# Patient Record
Sex: MALE | Race: WHITE | NOT HISPANIC OR LATINO | Employment: OTHER | URBAN - NONMETROPOLITAN AREA
[De-identification: names, ages, dates, MRNs, and addresses within clinical notes are randomized per-mention and may not be internally consistent; named-entity substitution may affect disease eponyms.]

---

## 2017-02-01 ENCOUNTER — HOSPITAL ENCOUNTER (EMERGENCY)
Facility: HOSPITAL | Age: 37
Discharge: HOME | End: 2017-02-01
Attending: EMERGENCY MEDICINE | Admitting: EMERGENCY MEDICINE

## 2017-02-01 ENCOUNTER — APPOINTMENT (OUTPATIENT)
Dept: GENERAL RADIOLOGY | Facility: HOSPITAL | Age: 37
End: 2017-02-01

## 2017-02-01 VITALS
WEIGHT: 315 LBS | BODY MASS INDEX: 44.1 KG/M2 | RESPIRATION RATE: 20 BRPM | OXYGEN SATURATION: 94 % | TEMPERATURE: 98.1 F | SYSTOLIC BLOOD PRESSURE: 150 MMHG | DIASTOLIC BLOOD PRESSURE: 76 MMHG | HEART RATE: 81 BPM | HEIGHT: 71 IN

## 2017-02-01 DIAGNOSIS — R07.89 CHEST PAIN, ATYPICAL: Primary | ICD-10-CM

## 2017-02-01 LAB
ALBUMIN SERPL-MCNC: 3.8 G/DL (ref 3.4–4.8)
ALBUMIN/GLOB SERPL: 1.2 G/DL (ref 1.1–1.8)
ALP SERPL-CCNC: 74 U/L (ref 38–126)
ALT SERPL W P-5'-P-CCNC: 33 U/L (ref 21–72)
ANION GAP SERPL CALCULATED.3IONS-SCNC: 10 MMOL/L (ref 5–15)
AST SERPL-CCNC: 33 U/L (ref 17–59)
BASOPHILS # BLD AUTO: 0.02 10*3/MM3 (ref 0–0.2)
BASOPHILS NFR BLD AUTO: 0.2 % (ref 0–2)
BILIRUB SERPL-MCNC: 0.5 MG/DL (ref 0.2–1.3)
BUN BLD-MCNC: 12 MG/DL (ref 7–21)
BUN/CREAT SERPL: 17.1 (ref 7–25)
CALCIUM SPEC-SCNC: 9.1 MG/DL (ref 8.4–10.2)
CHLORIDE SERPL-SCNC: 95 MMOL/L (ref 95–110)
CO2 SERPL-SCNC: 28 MMOL/L (ref 22–31)
CREAT BLD-MCNC: 0.7 MG/DL (ref 0.7–1.3)
DEPRECATED RDW RBC AUTO: 44.7 FL (ref 35.1–43.9)
EOSINOPHIL # BLD AUTO: 0.18 10*3/MM3 (ref 0–0.7)
EOSINOPHIL NFR BLD AUTO: 1.7 % (ref 0–7)
ERYTHROCYTE [DISTWIDTH] IN BLOOD BY AUTOMATED COUNT: 15.4 % (ref 11.5–14.5)
GFR SERPL CREATININE-BSD FRML MDRD: 126 ML/MIN/1.73 (ref 70–162)
GLOBULIN UR ELPH-MCNC: 3.3 GM/DL (ref 2.3–3.5)
GLUCOSE BLD-MCNC: 156 MG/DL (ref 60–100)
HCT VFR BLD AUTO: 37.3 % (ref 39–49)
HGB BLD-MCNC: 12.8 G/DL (ref 13.7–17.3)
HOLD SPECIMEN: NORMAL
HOLD SPECIMEN: NORMAL
IMM GRANULOCYTES # BLD: 0.1 10*3/MM3 (ref 0–0.02)
IMM GRANULOCYTES NFR BLD: 0.9 % (ref 0–0.5)
INR PPP: 1.07 (ref 0.8–1.2)
LYMPHOCYTES # BLD AUTO: 1.59 10*3/MM3 (ref 0.6–4.2)
LYMPHOCYTES NFR BLD AUTO: 15 % (ref 10–50)
MCH RBC QN AUTO: 27.1 PG (ref 26.5–34)
MCHC RBC AUTO-ENTMCNC: 34.3 G/DL (ref 31.5–36.3)
MCV RBC AUTO: 78.9 FL (ref 80–98)
MONOCYTES # BLD AUTO: 0.76 10*3/MM3 (ref 0–0.9)
MONOCYTES NFR BLD AUTO: 7.2 % (ref 0–12)
NEUTROPHILS # BLD AUTO: 7.96 10*3/MM3 (ref 2–8.6)
NEUTROPHILS NFR BLD AUTO: 75 % (ref 37–80)
NRBC BLD MANUAL-RTO: 0 /100 WBC (ref 0–0)
NT-PROBNP SERPL-MCNC: 37.7 PG/ML (ref 0–450)
PLATELET # BLD AUTO: 204 10*3/MM3 (ref 150–450)
PMV BLD AUTO: 9.1 FL (ref 8–12)
POTASSIUM BLD-SCNC: 3.9 MMOL/L (ref 3.5–5.1)
PROT SERPL-MCNC: 7.1 G/DL (ref 6.3–8.6)
PROTHROMBIN TIME: 13.9 SECONDS (ref 11.1–15.3)
RBC # BLD AUTO: 4.73 10*6/MM3 (ref 4.37–5.74)
SODIUM BLD-SCNC: 133 MMOL/L (ref 137–145)
TROPONIN I SERPL-MCNC: <0.012 NG/ML
TROPONIN I SERPL-MCNC: <0.012 NG/ML
WBC NRBC COR # BLD: 10.61 10*3/MM3 (ref 3.2–9.8)
WHOLE BLOOD HOLD SPECIMEN: NORMAL
WHOLE BLOOD HOLD SPECIMEN: NORMAL

## 2017-02-01 PROCEDURE — 93010 ELECTROCARDIOGRAM REPORT: CPT | Performed by: INTERNAL MEDICINE

## 2017-02-01 PROCEDURE — 83880 ASSAY OF NATRIURETIC PEPTIDE: CPT | Performed by: EMERGENCY MEDICINE

## 2017-02-01 PROCEDURE — 71010 HC CHEST PA OR AP: CPT

## 2017-02-01 PROCEDURE — 85610 PROTHROMBIN TIME: CPT | Performed by: EMERGENCY MEDICINE

## 2017-02-01 PROCEDURE — 84484 ASSAY OF TROPONIN QUANT: CPT | Performed by: EMERGENCY MEDICINE

## 2017-02-01 PROCEDURE — 80053 COMPREHEN METABOLIC PANEL: CPT | Performed by: EMERGENCY MEDICINE

## 2017-02-01 PROCEDURE — 93005 ELECTROCARDIOGRAM TRACING: CPT | Performed by: EMERGENCY MEDICINE

## 2017-02-01 PROCEDURE — 93005 ELECTROCARDIOGRAM TRACING: CPT

## 2017-02-01 PROCEDURE — 36415 COLL VENOUS BLD VENIPUNCTURE: CPT

## 2017-02-01 PROCEDURE — 99284 EMERGENCY DEPT VISIT MOD MDM: CPT

## 2017-02-01 PROCEDURE — 85025 COMPLETE CBC W/AUTO DIFF WBC: CPT | Performed by: EMERGENCY MEDICINE

## 2017-02-01 RX ORDER — ASPIRIN 325 MG
325 TABLET ORAL ONCE
Status: COMPLETED | OUTPATIENT
Start: 2017-02-01 | End: 2017-02-01

## 2017-02-01 RX ORDER — SODIUM CHLORIDE 0.9 % (FLUSH) 0.9 %
10 SYRINGE (ML) INJECTION AS NEEDED
Status: DISCONTINUED | OUTPATIENT
Start: 2017-02-01 | End: 2017-02-01 | Stop reason: HOSPADM

## 2017-02-01 RX ADMIN — Medication 10 ML: at 16:45

## 2017-02-01 RX ADMIN — NITROGLYCERIN 1 INCH: 20 OINTMENT TOPICAL at 15:24

## 2017-02-01 RX ADMIN — ASPIRIN 325 MG: 325 TABLET, COATED ORAL at 15:24

## 2017-02-01 NOTE — ED NOTES
Call placed to lab to have blood drawn due to patient very difficult stick.       Ritika Osorio RN  02/01/17 1343

## 2017-02-01 NOTE — ED PROVIDER NOTES
Subjective   HPI Comments: Mr. Hansen presents today with recurrent CP across the substernal area with some mild dizziness, just a touch per his account.  No recent illnesses.  Patient has had prior stenting and has been having chest pain episodes about once a month since.  This has been associated with small vessel disease.      Patient is a 36 y.o. male presenting with chest pain.   History provided by:  Patient   used: No    Chest Pain   Pain location:  Substernal area  Pain quality: aching, pressure and tightness    Pain radiates to:  Does not radiate  Pain severity:  No pain  Onset quality:  Sudden  Duration:  2 hours  Timing:  Unable to specify  Progression:  Improving  Chronicity:  New  Context: not breathing and not trauma    Relieved by:  Nothing  Worsened by:  Nothing  Ineffective treatments:  None tried  Associated symptoms: dizziness (mild) and fatigue    Associated symptoms: no abdominal pain, no AICD problem, no altered mental status, no anorexia, no anxiety, no back pain, no cough, no diaphoresis, no fever, no headache, no nausea, no near-syncope, no numbness, no orthopnea, no palpitations, no shortness of breath, no syncope, no vomiting and no weakness    Risk factors: coronary artery disease and obesity        Review of Systems   Constitutional: Positive for fatigue. Negative for appetite change, chills, diaphoresis and fever.   HENT: Negative.  Negative for congestion.    Eyes: Negative.  Negative for photophobia and visual disturbance.   Respiratory: Positive for chest tightness. Negative for cough and shortness of breath.    Cardiovascular: Positive for chest pain. Negative for palpitations, orthopnea, syncope and near-syncope.   Gastrointestinal: Negative.  Negative for abdominal pain, anorexia, constipation, diarrhea, nausea and vomiting.   Endocrine: Negative.    Genitourinary: Negative.  Negative for decreased urine volume, dysuria, flank pain and hematuria.    Musculoskeletal: Negative.  Negative for arthralgias, back pain, myalgias, neck pain and neck stiffness.   Skin: Negative.  Negative for pallor.   Neurological: Positive for dizziness (mild). Negative for syncope, weakness, light-headedness, numbness and headaches.   Psychiatric/Behavioral: Negative.  Negative for confusion and suicidal ideas. The patient is not nervous/anxious.    All other systems reviewed and are negative.      Past Medical History   Diagnosis Date   • Abdominal pain    • Acute right otitis media    • Allergic rhinitis    • Asthma    • Backache    • Bronchitis    • Cellulitis of lower leg      Right   • Chest pain    • Chronic back pain    • Chronic pain    • Cough    • Diarrhea    • Dyspnea    • GERD (gastroesophageal reflux disease)    • Headache    • Hip pain    • Hypertension    • Insomnia    • Low back pain    • Lumbosacral strain    • Morbid obesity    • RLS (restless legs syndrome)    • Seizures        Allergies   Allergen Reactions   • Glipizide    • Phenergan [Promethazine Hcl]    • Risperdal [Risperidone]    • Tramadol        Past Surgical History   Procedure Laterality Date   • Transesophageal echocardiogram ([facility])       With color flow       Family History   Problem Relation Age of Onset   • Cancer Other    • Coronary artery disease Other    • Diabetes Other    • Heart disease Other    • Hypertension Other        Social History     Social History   • Marital status:      Spouse name: N/A   • Number of children: N/A   • Years of education: N/A     Social History Main Topics   • Smoking status: Former Smoker   • Smokeless tobacco: None   • Alcohol use No   • Drug use: No   • Sexual activity: Not Asked     Other Topics Concern   • None     Social History Narrative           Objective   Physical Exam   Constitutional: He is oriented to person, place, and time. He appears well-developed and well-nourished. No distress.   HENT:   Head: Normocephalic and atraumatic.   Eyes:  Conjunctivae and EOM are normal.   Neck: Normal range of motion. Neck supple. No JVD present. No tracheal deviation present.   Cardiovascular: Normal rate, regular rhythm, normal heart sounds and intact distal pulses.  Exam reveals no gallop and no friction rub.    No murmur heard.  Pulmonary/Chest: Effort normal and breath sounds normal. No respiratory distress. He has no wheezes. He has no rales. He exhibits no tenderness.   Abdominal: Soft. Bowel sounds are normal. He exhibits no distension and no mass. There is no tenderness. There is no rebound and no guarding.   Musculoskeletal: Normal range of motion. He exhibits edema.   Neurological: He is alert and oriented to person, place, and time.   Skin: Skin is warm and dry.   Psychiatric: He has a normal mood and affect. His behavior is normal. Judgment and thought content normal.   Nursing note and vitals reviewed.      ECG 12 Lead    Date/Time: 2/1/2017 3:05 PM  Performed by: RICARDA SHEETS  Authorized by: RICARDA SHEETS   Interpreted by physician  Comparison: compared with previous ECG   Comparison to previous ECG: No change from 15jan2017  Conduction: conduction normal  Other: no other findings  Comments: Nonspecific ST T wave changes                 ED Course  ED Course      Labs Reviewed   COMPREHENSIVE METABOLIC PANEL - Abnormal; Notable for the following:        Result Value    Glucose 156 (*)     Sodium 133 (*)     All other components within normal limits   CBC WITH AUTO DIFFERENTIAL - Abnormal; Notable for the following:     WBC 10.61 (*)     Hemoglobin 12.8 (*)     Hematocrit 37.3 (*)     MCV 78.9 (*)     RDW 15.4 (*)     RDW-SD 44.7 (*)     Immature Grans % 0.9 (*)     Immature Grans, Absolute 0.10 (*)     All other components within normal limits   TROPONIN (IN-HOUSE) - Normal   PROTIME-INR - Normal    Narrative:     Therapeutic range for most indications is 2.0-3.0 INR,  or 2.5-3.5 for mechanical heart valves.   BNP (IN-HOUSE) - Normal   TROPONIN  (IN-HOUSE) - Normal   RAINBOW DRAW    Narrative:     The following orders were created for panel order [place] Draw.  Procedure                               Abnormality         Status                     ---------                               -----------         ------                     Light Blue Top[[id number]]                                    In process                 Green Top (Gel)[[id number]]                                   In process                 Lavender Top[[id number]]                                      In process                 Gold Top - SST[[id number]]                                    In process                   Please view results for these tests on the individual orders.   CBC AND DIFFERENTIAL    Narrative:     The following orders were created for panel order CBC & Differential.  Procedure                               Abnormality         Status                     ---------                               -----------         ------                     CBC Auto Differential[[id number]]         Abnormal            Final result                 Please view results for these tests on the individual orders.   LIGHT BLUE TOP   GREEN TOP   LAVENDER TOP   GOLD TOP - SST       XR Chest 1 View   Final Result   CONCLUSION:    Borderline cardiac size. No acute cardiopulmonary process identified.      Electronically signed by:  Gela Shaffer MD  2/1/2017 3:27 PM CST           Negative markers x 2.  Patient with recent CT of the coronaries negative.  Case discussed with patients cardiologist, Dr. Aldnaa.  Will follow patient outpatient for further management.  Does not appear to be ACS.              [facility]    Final diagnoses:   Chest pain, atypical            Asim Kumar MD  02/01/17 9970

## 2017-02-01 NOTE — ED NOTES
Assisted MD with visual exam of perineum, some whitish discharge noted to perineum.  Swab obtained.     Ritika Osorio RN  02/01/17 9525

## 2017-02-02 NOTE — DISCHARGE INSTRUCTIONS
Continue current medications.  Followup with Dr. Aldana tomorrow for further management.  Your case was discussed with him over the phone.  Return with any new or worsening symptoms, or any concerns.

## 2017-03-08 ENCOUNTER — APPOINTMENT (OUTPATIENT)
Dept: GENERAL RADIOLOGY | Facility: HOSPITAL | Age: 37
End: 2017-03-08

## 2017-03-08 ENCOUNTER — HOSPITAL ENCOUNTER (OUTPATIENT)
Facility: HOSPITAL | Age: 37
Setting detail: OBSERVATION
Discharge: HOME | End: 2017-03-10
Attending: EMERGENCY MEDICINE | Admitting: INTERNAL MEDICINE

## 2017-03-08 DIAGNOSIS — R07.2 PRECORDIAL PAIN: Primary | ICD-10-CM

## 2017-03-08 LAB
ALBUMIN SERPL-MCNC: 3.9 G/DL (ref 3.4–4.8)
ALBUMIN/GLOB SERPL: 1.1 G/DL (ref 1.1–1.8)
ALP SERPL-CCNC: 77 U/L (ref 38–126)
ALT SERPL W P-5'-P-CCNC: 35 U/L (ref 21–72)
ANION GAP SERPL CALCULATED.3IONS-SCNC: 11 MMOL/L (ref 5–15)
ANION GAP SERPL CALCULATED.3IONS-SCNC: 11 MMOL/L (ref 5–15)
AST SERPL-CCNC: 28 U/L (ref 17–59)
BASOPHILS # BLD AUTO: 0.02 10*3/MM3 (ref 0–0.2)
BASOPHILS # BLD AUTO: 0.02 10*3/MM3 (ref 0–0.2)
BASOPHILS NFR BLD AUTO: 0.2 % (ref 0–2)
BASOPHILS NFR BLD AUTO: 0.3 % (ref 0–2)
BILIRUB SERPL-MCNC: 0.4 MG/DL (ref 0.2–1.3)
BUN BLD-MCNC: 14 MG/DL (ref 7–21)
BUN BLD-MCNC: 15 MG/DL (ref 7–21)
BUN/CREAT SERPL: 23.1 (ref 7–25)
BUN/CREAT SERPL: 23.3 (ref 7–25)
CALCIUM SPEC-SCNC: 8.8 MG/DL (ref 8.4–10.2)
CALCIUM SPEC-SCNC: 8.9 MG/DL (ref 8.4–10.2)
CHLORIDE SERPL-SCNC: 97 MMOL/L (ref 95–110)
CHLORIDE SERPL-SCNC: 98 MMOL/L (ref 95–110)
CHOLEST SERPL-MCNC: 176 MG/DL (ref 0–199)
CO2 SERPL-SCNC: 26 MMOL/L (ref 22–31)
CO2 SERPL-SCNC: 26 MMOL/L (ref 22–31)
CREAT BLD-MCNC: 0.6 MG/DL (ref 0.7–1.3)
CREAT BLD-MCNC: 0.65 MG/DL (ref 0.7–1.3)
D-DIMER, QUANTITATIVE (MAD,POW, STR): 355 NG/ML (FEU) (ref 0–470)
DEPRECATED RDW RBC AUTO: 43 FL (ref 35.1–43.9)
DEPRECATED RDW RBC AUTO: 44.3 FL (ref 35.1–43.9)
EOSINOPHIL # BLD AUTO: 0.2 10*3/MM3 (ref 0–0.7)
EOSINOPHIL # BLD AUTO: 0.22 10*3/MM3 (ref 0–0.7)
EOSINOPHIL NFR BLD AUTO: 2.1 % (ref 0–7)
EOSINOPHIL NFR BLD AUTO: 3.2 % (ref 0–7)
ERYTHROCYTE [DISTWIDTH] IN BLOOD BY AUTOMATED COUNT: 14.6 % (ref 11.5–14.5)
ERYTHROCYTE [DISTWIDTH] IN BLOOD BY AUTOMATED COUNT: 15.1 % (ref 11.5–14.5)
GFR SERPL CREATININE-BSD FRML MDRD: 137 ML/MIN/1.73 (ref 70–162)
GFR SERPL CREATININE-BSD FRML MDRD: 151 ML/MIN/1.73 (ref 70–162)
GLOBULIN UR ELPH-MCNC: 3.7 GM/DL (ref 2.3–3.5)
GLUCOSE BLD-MCNC: 143 MG/DL (ref 60–100)
GLUCOSE BLD-MCNC: 209 MG/DL (ref 60–100)
GLUCOSE BLDC GLUCOMTR-MCNC: 292 MG/DL (ref 70–130)
HCT VFR BLD AUTO: 37.7 % (ref 39–49)
HCT VFR BLD AUTO: 39.1 % (ref 39–49)
HGB BLD-MCNC: 12.6 G/DL (ref 13.7–17.3)
HGB BLD-MCNC: 13.4 G/DL (ref 13.7–17.3)
HOLD SPECIMEN: NORMAL
HOLD SPECIMEN: NORMAL
IMM GRANULOCYTES # BLD: 0.1 10*3/MM3 (ref 0–0.02)
IMM GRANULOCYTES # BLD: 0.1 10*3/MM3 (ref 0–0.02)
IMM GRANULOCYTES NFR BLD: 1.1 % (ref 0–0.5)
IMM GRANULOCYTES NFR BLD: 1.5 % (ref 0–0.5)
LYMPHOCYTES # BLD AUTO: 1.59 10*3/MM3 (ref 0.6–4.2)
LYMPHOCYTES # BLD AUTO: 1.63 10*3/MM3 (ref 0.6–4.2)
LYMPHOCYTES NFR BLD AUTO: 17.4 % (ref 10–50)
LYMPHOCYTES NFR BLD AUTO: 23.1 % (ref 10–50)
MCH RBC QN AUTO: 27 PG (ref 26.5–34)
MCH RBC QN AUTO: 27.7 PG (ref 26.5–34)
MCHC RBC AUTO-ENTMCNC: 33.4 G/DL (ref 31.5–36.3)
MCHC RBC AUTO-ENTMCNC: 34.3 G/DL (ref 31.5–36.3)
MCV RBC AUTO: 80.7 FL (ref 80–98)
MCV RBC AUTO: 81 FL (ref 80–98)
MONOCYTES # BLD AUTO: 0.46 10*3/MM3 (ref 0–0.9)
MONOCYTES # BLD AUTO: 0.91 10*3/MM3 (ref 0–0.9)
MONOCYTES NFR BLD AUTO: 6.7 % (ref 0–12)
MONOCYTES NFR BLD AUTO: 9.7 % (ref 0–12)
NEUTROPHILS # BLD AUTO: 4.5 10*3/MM3 (ref 2–8.6)
NEUTROPHILS # BLD AUTO: 6.51 10*3/MM3 (ref 2–8.6)
NEUTROPHILS NFR BLD AUTO: 65.2 % (ref 37–80)
NEUTROPHILS NFR BLD AUTO: 69.5 % (ref 37–80)
NT-PROBNP SERPL-MCNC: 47.9 PG/ML (ref 0–450)
PLAT MORPH BLD: NORMAL
PLATELET # BLD AUTO: 176 10*3/MM3 (ref 150–450)
PLATELET # BLD AUTO: 181 10*3/MM3 (ref 150–450)
PMV BLD AUTO: 8.7 FL (ref 8–12)
PMV BLD AUTO: 9.5 FL (ref 8–12)
POTASSIUM BLD-SCNC: 4 MMOL/L (ref 3.5–5.1)
POTASSIUM BLD-SCNC: 4.1 MMOL/L (ref 3.5–5.1)
PROT SERPL-MCNC: 7.6 G/DL (ref 6.3–8.6)
RBC # BLD AUTO: 4.67 10*6/MM3 (ref 4.37–5.74)
RBC # BLD AUTO: 4.83 10*6/MM3 (ref 4.37–5.74)
RBC MORPH BLD: NORMAL
SODIUM BLD-SCNC: 134 MMOL/L (ref 137–145)
SODIUM BLD-SCNC: 135 MMOL/L (ref 137–145)
TROPONIN I SERPL-MCNC: <0.012 NG/ML
TROPONIN I SERPL-MCNC: <0.012 NG/ML
WBC MORPH BLD: NORMAL
WBC NRBC COR # BLD: 6.89 10*3/MM3 (ref 3.2–9.8)
WBC NRBC COR # BLD: 9.37 10*3/MM3 (ref 3.2–9.8)
WHOLE BLOOD HOLD SPECIMEN: NORMAL
WHOLE BLOOD HOLD SPECIMEN: NORMAL

## 2017-03-08 PROCEDURE — 96376 TX/PRO/DX INJ SAME DRUG ADON: CPT

## 2017-03-08 PROCEDURE — 80053 COMPREHEN METABOLIC PANEL: CPT | Performed by: EMERGENCY MEDICINE

## 2017-03-08 PROCEDURE — 83880 ASSAY OF NATRIURETIC PEPTIDE: CPT | Performed by: EMERGENCY MEDICINE

## 2017-03-08 PROCEDURE — 96374 THER/PROPH/DIAG INJ IV PUSH: CPT

## 2017-03-08 PROCEDURE — 25010000002 MORPHINE PER 10 MG: Performed by: EMERGENCY MEDICINE

## 2017-03-08 PROCEDURE — 25010000002 ONDANSETRON PER 1 MG: Performed by: NURSE PRACTITIONER

## 2017-03-08 PROCEDURE — 93005 ELECTROCARDIOGRAM TRACING: CPT | Performed by: INTERNAL MEDICINE

## 2017-03-08 PROCEDURE — 96375 TX/PRO/DX INJ NEW DRUG ADDON: CPT

## 2017-03-08 PROCEDURE — 82962 GLUCOSE BLOOD TEST: CPT

## 2017-03-08 PROCEDURE — 85025 COMPLETE CBC W/AUTO DIFF WBC: CPT | Performed by: EMERGENCY MEDICINE

## 2017-03-08 PROCEDURE — 85379 FIBRIN DEGRADATION QUANT: CPT | Performed by: EMERGENCY MEDICINE

## 2017-03-08 PROCEDURE — G0378 HOSPITAL OBSERVATION PER HR: HCPCS

## 2017-03-08 PROCEDURE — 82465 ASSAY BLD/SERUM CHOLESTEROL: CPT | Performed by: NURSE PRACTITIONER

## 2017-03-08 PROCEDURE — 63710000001 INSULIN ASPART PER 5 UNITS: Performed by: NURSE PRACTITIONER

## 2017-03-08 PROCEDURE — 84484 ASSAY OF TROPONIN QUANT: CPT | Performed by: NURSE PRACTITIONER

## 2017-03-08 PROCEDURE — 84484 ASSAY OF TROPONIN QUANT: CPT | Performed by: EMERGENCY MEDICINE

## 2017-03-08 PROCEDURE — 99285 EMERGENCY DEPT VISIT HI MDM: CPT

## 2017-03-08 PROCEDURE — 71010 HC CHEST PA OR AP: CPT

## 2017-03-08 PROCEDURE — 25010000002 ONDANSETRON PER 1 MG: Performed by: EMERGENCY MEDICINE

## 2017-03-08 PROCEDURE — 93010 ELECTROCARDIOGRAM REPORT: CPT | Performed by: INTERNAL MEDICINE

## 2017-03-08 PROCEDURE — 85007 BL SMEAR W/DIFF WBC COUNT: CPT | Performed by: NURSE PRACTITIONER

## 2017-03-08 PROCEDURE — 93005 ELECTROCARDIOGRAM TRACING: CPT | Performed by: NURSE PRACTITIONER

## 2017-03-08 PROCEDURE — 93005 ELECTROCARDIOGRAM TRACING: CPT

## 2017-03-08 PROCEDURE — 85025 COMPLETE CBC W/AUTO DIFF WBC: CPT | Performed by: NURSE PRACTITIONER

## 2017-03-08 RX ORDER — SODIUM CHLORIDE 0.9 % (FLUSH) 0.9 %
1-10 SYRINGE (ML) INJECTION AS NEEDED
Status: DISCONTINUED | OUTPATIENT
Start: 2017-03-08 | End: 2017-03-10 | Stop reason: HOSPADM

## 2017-03-08 RX ORDER — NITROGLYCERIN 0.4 MG/1
0.4 TABLET SUBLINGUAL
Status: DISCONTINUED | OUTPATIENT
Start: 2017-03-08 | End: 2017-03-10 | Stop reason: HOSPADM

## 2017-03-08 RX ORDER — ONDANSETRON 2 MG/ML
4 INJECTION INTRAMUSCULAR; INTRAVENOUS ONCE
Status: COMPLETED | OUTPATIENT
Start: 2017-03-08 | End: 2017-03-08

## 2017-03-08 RX ORDER — PANTOPRAZOLE SODIUM 40 MG/1
40 TABLET, DELAYED RELEASE ORAL
Status: DISCONTINUED | OUTPATIENT
Start: 2017-03-09 | End: 2017-03-10 | Stop reason: HOSPADM

## 2017-03-08 RX ORDER — ISOSORBIDE MONONITRATE 60 MG/1
120 TABLET, EXTENDED RELEASE ORAL DAILY
Status: DISCONTINUED | OUTPATIENT
Start: 2017-03-09 | End: 2017-03-10 | Stop reason: HOSPADM

## 2017-03-08 RX ORDER — MORPHINE SULFATE 4 MG/ML
4 INJECTION, SOLUTION INTRAMUSCULAR; INTRAVENOUS ONCE
Status: COMPLETED | OUTPATIENT
Start: 2017-03-08 | End: 2017-03-08

## 2017-03-08 RX ORDER — DEXTROSE MONOHYDRATE 25 G/50ML
25 INJECTION, SOLUTION INTRAVENOUS
Status: DISCONTINUED | OUTPATIENT
Start: 2017-03-08 | End: 2017-03-10 | Stop reason: HOSPADM

## 2017-03-08 RX ORDER — PRAMIPEXOLE DIHYDROCHLORIDE 1 MG/1
2 TABLET ORAL NIGHTLY
Status: DISCONTINUED | OUTPATIENT
Start: 2017-03-08 | End: 2017-03-10 | Stop reason: HOSPADM

## 2017-03-08 RX ORDER — METOPROLOL SUCCINATE 100 MG/1
100 TABLET, EXTENDED RELEASE ORAL DAILY
Status: DISCONTINUED | OUTPATIENT
Start: 2017-03-08 | End: 2017-03-10 | Stop reason: HOSPADM

## 2017-03-08 RX ORDER — PANTOPRAZOLE SODIUM 40 MG/1
40 TABLET, DELAYED RELEASE ORAL
Status: DISCONTINUED | OUTPATIENT
Start: 2017-03-09 | End: 2017-03-08 | Stop reason: SDUPTHER

## 2017-03-08 RX ORDER — ONDANSETRON 4 MG/1
4 TABLET, FILM COATED ORAL EVERY 6 HOURS PRN
Status: DISCONTINUED | OUTPATIENT
Start: 2017-03-08 | End: 2017-03-10 | Stop reason: HOSPADM

## 2017-03-08 RX ORDER — ASPIRIN 81 MG/1
81 TABLET, CHEWABLE ORAL DAILY
Status: DISCONTINUED | OUTPATIENT
Start: 2017-03-09 | End: 2017-03-10 | Stop reason: HOSPADM

## 2017-03-08 RX ORDER — RANOLAZINE 500 MG/1
1000 TABLET, EXTENDED RELEASE ORAL EVERY 12 HOURS SCHEDULED
Status: DISCONTINUED | OUTPATIENT
Start: 2017-03-08 | End: 2017-03-10 | Stop reason: HOSPADM

## 2017-03-08 RX ORDER — NICOTINE POLACRILEX 4 MG
15 LOZENGE BUCCAL
Status: DISCONTINUED | OUTPATIENT
Start: 2017-03-08 | End: 2017-03-10 | Stop reason: HOSPADM

## 2017-03-08 RX ORDER — SODIUM CHLORIDE 0.9 % (FLUSH) 0.9 %
10 SYRINGE (ML) INJECTION AS NEEDED
Status: DISCONTINUED | OUTPATIENT
Start: 2017-03-08 | End: 2017-03-10 | Stop reason: HOSPADM

## 2017-03-08 RX ORDER — AMLODIPINE BESYLATE 10 MG/1
10 TABLET ORAL DAILY
Status: DISCONTINUED | OUTPATIENT
Start: 2017-03-08 | End: 2017-03-10 | Stop reason: HOSPADM

## 2017-03-08 RX ORDER — PRASUGREL 10 MG/1
10 TABLET, FILM COATED ORAL DAILY
Status: DISCONTINUED | OUTPATIENT
Start: 2017-03-09 | End: 2017-03-10 | Stop reason: HOSPADM

## 2017-03-08 RX ORDER — LISINOPRIL 20 MG/1
20 TABLET ORAL DAILY
Status: DISCONTINUED | OUTPATIENT
Start: 2017-03-08 | End: 2017-03-10 | Stop reason: HOSPADM

## 2017-03-08 RX ORDER — ONDANSETRON 4 MG/1
4 TABLET, ORALLY DISINTEGRATING ORAL EVERY 6 HOURS PRN
Status: DISCONTINUED | OUTPATIENT
Start: 2017-03-08 | End: 2017-03-10 | Stop reason: HOSPADM

## 2017-03-08 RX ORDER — PRAVASTATIN SODIUM 40 MG
80 TABLET ORAL NIGHTLY
Status: DISCONTINUED | OUTPATIENT
Start: 2017-03-08 | End: 2017-03-10 | Stop reason: HOSPADM

## 2017-03-08 RX ORDER — TRAZODONE HYDROCHLORIDE 150 MG/1
300 TABLET ORAL NIGHTLY
Status: DISCONTINUED | OUTPATIENT
Start: 2017-03-08 | End: 2017-03-10 | Stop reason: HOSPADM

## 2017-03-08 RX ORDER — ASPIRIN 325 MG
325 TABLET ORAL ONCE
Status: DISCONTINUED | OUTPATIENT
Start: 2017-03-08 | End: 2017-03-08

## 2017-03-08 RX ORDER — ACETAMINOPHEN 325 MG/1
650 TABLET ORAL EVERY 4 HOURS PRN
Status: DISCONTINUED | OUTPATIENT
Start: 2017-03-08 | End: 2017-03-10 | Stop reason: HOSPADM

## 2017-03-08 RX ORDER — FAMOTIDINE 20 MG/1
20 TABLET, FILM COATED ORAL 2 TIMES DAILY
Status: DISCONTINUED | OUTPATIENT
Start: 2017-03-08 | End: 2017-03-10 | Stop reason: HOSPADM

## 2017-03-08 RX ORDER — HYDROCODONE BITARTRATE AND ACETAMINOPHEN 10; 325 MG/1; MG/1
1 TABLET ORAL EVERY 6 HOURS PRN
Status: DISCONTINUED | OUTPATIENT
Start: 2017-03-08 | End: 2017-03-10 | Stop reason: HOSPADM

## 2017-03-08 RX ORDER — ONDANSETRON 2 MG/ML
4 INJECTION INTRAMUSCULAR; INTRAVENOUS EVERY 6 HOURS PRN
Status: DISCONTINUED | OUTPATIENT
Start: 2017-03-08 | End: 2017-03-10 | Stop reason: HOSPADM

## 2017-03-08 RX ADMIN — HYDROCODONE BITARTRATE AND ACETAMINOPHEN 1 TABLET: 10; 325 TABLET ORAL at 22:52

## 2017-03-08 RX ADMIN — MORPHINE SULFATE 4 MG: 4 INJECTION, SOLUTION INTRAMUSCULAR; INTRAVENOUS at 17:54

## 2017-03-08 RX ADMIN — INSULIN ASPART 4 UNITS: 100 INJECTION, SOLUTION INTRAVENOUS; SUBCUTANEOUS at 22:51

## 2017-03-08 RX ADMIN — NITROGLYCERIN 1 INCH: 20 OINTMENT TOPICAL at 17:54

## 2017-03-08 RX ADMIN — FAMOTIDINE 20 MG: 20 TABLET ORAL at 20:12

## 2017-03-08 RX ADMIN — RANOLAZINE 1000 MG: 500 TABLET, FILM COATED, EXTENDED RELEASE ORAL at 20:12

## 2017-03-08 RX ADMIN — ONDANSETRON 4 MG: 2 INJECTION, SOLUTION INTRAMUSCULAR; INTRAVENOUS at 22:52

## 2017-03-08 RX ADMIN — PRAMIPEXOLE DIHYDROCHLORIDE 2 MG: 1 TABLET ORAL at 20:12

## 2017-03-08 RX ADMIN — ONDANSETRON 4 MG: 2 INJECTION INTRAMUSCULAR; INTRAVENOUS at 17:54

## 2017-03-08 RX ADMIN — PRAVASTATIN SODIUM 80 MG: 40 TABLET ORAL at 20:15

## 2017-03-08 RX ADMIN — AMLODIPINE BESYLATE 10 MG: 10 TABLET ORAL at 20:14

## 2017-03-08 RX ADMIN — TRAZODONE HYDROCHLORIDE 300 MG: 150 TABLET ORAL at 22:57

## 2017-03-08 RX ADMIN — METOPROLOL SUCCINATE 100 MG: 100 TABLET, EXTENDED RELEASE ORAL at 20:14

## 2017-03-08 NOTE — ED PROVIDER NOTES
Subjective   Patient is a 36 y.o. male presenting with chest pain.   History provided by:  Patient  Chest Pain   Pain location:  Substernal area  Pain quality: aching    Pain radiates to:  R arm  Pain severity:  Moderate  Onset quality:  Gradual  Duration:  3 hours  Timing:  Intermittent  Progression:  Worsening  Chronicity:  Recurrent  Relieved by:  Nothing  Worsened by:  Nothing  Ineffective treatments:  Nitroglycerin and aspirin  Associated symptoms: shortness of breath    Associated symptoms: no abdominal pain, no anxiety, no back pain, no cough, no dizziness, no fever, no headache, no nausea, no numbness, no palpitations and no vomiting    Shortness of breath:     Severity:  Moderate    Onset quality:  Gradual    Timing:  Intermittent    Progression:  Waxing and waning  Risk factors: coronary artery disease, diabetes mellitus, high cholesterol, hypertension, male sex and obesity        Review of Systems   Constitutional: Negative for activity change, chills and fever.   HENT: Negative for congestion, facial swelling, rhinorrhea and sinus pressure.    Eyes: Negative for photophobia and visual disturbance.   Respiratory: Positive for shortness of breath. Negative for cough, chest tightness and wheezing.    Cardiovascular: Positive for chest pain. Negative for palpitations.   Gastrointestinal: Negative for abdominal distention, abdominal pain, diarrhea, nausea and vomiting.   Endocrine: Negative for polyuria.   Genitourinary: Negative for flank pain.   Musculoskeletal: Negative for back pain, joint swelling and neck pain.   Skin: Negative for rash.   Neurological: Negative for dizziness, seizures, numbness and headaches.   Hematological: Negative for adenopathy.   Psychiatric/Behavioral: Negative for agitation.       Past Medical History   Diagnosis Date   • Abdominal pain    • Acute right otitis media    • Allergic rhinitis    • Asthma    • Backache    • Bronchitis    • Cellulitis of lower leg      Right   •  Chest pain    • Chronic back pain    • Chronic pain    • Cough    • Diarrhea    • Dyspnea    • GERD (gastroesophageal reflux disease)    • Headache    • Hip pain    • Hypertension    • Insomnia    • Low back pain    • Lumbosacral strain    • Morbid obesity    • RLS (restless legs syndrome)    • Seizures        Allergies   Allergen Reactions   • Glipizide    • Phenergan [Promethazine Hcl]    • Risperdal [Risperidone]    • Tramadol        Past Surgical History   Procedure Laterality Date   • Transesophageal echocardiogram ([facility])       With color flow   • Cardiac catheterization       LAD stent       Family History   Problem Relation Age of Onset   • Cancer Other    • Coronary artery disease Other    • Diabetes Other    • Heart disease Other    • Hypertension Other        Social History     Social History   • Marital status:      Spouse name: N/A   • Number of children: N/A   • Years of education: N/A     Social History Main Topics   • Smoking status: Former Smoker   • Smokeless tobacco: None   • Alcohol use No   • Drug use: No   • Sexual activity: Not Asked     Other Topics Concern   • None     Social History Narrative   • None           Objective   Physical Exam   Constitutional: He is oriented to person, place, and time. He appears well-developed and well-nourished. No distress.   HENT:   Head: Normocephalic and atraumatic.   Eyes: Conjunctivae are normal.   Neck: Normal range of motion. Neck supple.   Cardiovascular: Normal rate, regular rhythm and normal heart sounds.    Pulmonary/Chest: Effort normal and breath sounds normal. No respiratory distress. He has no wheezes.   Abdominal: Soft. Bowel sounds are normal. He exhibits no distension. There is no tenderness. There is no rebound and no guarding.   Musculoskeletal: Normal range of motion. He exhibits no edema or deformity.   Neurological: He is alert and oriented to person, place, and time.   Skin: Skin is warm and dry. No rash noted.   Psychiatric: He  has a normal mood and affect.   Nursing note and vitals reviewed.      ECG 12 Lead    Date/Time: 3/8/2017 4:10 PM  Performed by: ERIC HARRY  Authorized by: ERIC HARRY   Interpreted by physician  Rhythm: sinus rhythm  Rate: normal  QRS axis: normal  Conduction: conduction normal  ST Segments: ST segments normal  T Waves: T waves normal  Clinical impression: non-specific ECG               ED Course  ED Course   Comment By Time   Patient is given morphine and Nitropaste.  His pain is mildly improved.  He has negative D-dimer test and troponins ×1.  White count is negative.  Chest x-ray did not show any acute finding.  With his multiple risk factor he would be made observation admission for chest pain rule out.  I have D/w  and is accepted. Eric Harry MD 03/08 8191                Labs Reviewed   COMPREHENSIVE METABOLIC PANEL - Abnormal; Notable for the following:        Result Value    Glucose 209 (*)     Creatinine 0.65 (*)     Sodium 134 (*)     Globulin 3.7 (*)     All other components within normal limits   CBC WITH AUTO DIFFERENTIAL - Abnormal; Notable for the following:     Hemoglobin 13.4 (*)     RDW 14.6 (*)     Immature Grans % 1.1 (*)     Monocytes, Absolute 0.91 (*)     Immature Grans, Absolute 0.10 (*)     All other components within normal limits   BASIC METABOLIC PANEL - Abnormal; Notable for the following:     Glucose 143 (*)     Creatinine 0.60 (*)     Sodium 135 (*)     All other components within normal limits   CBC WITH AUTO DIFFERENTIAL - Abnormal; Notable for the following:     Hemoglobin 12.6 (*)     Hematocrit 37.7 (*)     RDW 15.1 (*)     RDW-SD 44.3 (*)     Immature Grans % 1.5 (*)     Immature Grans, Absolute 0.10 (*)     All other components within normal limits   BASIC METABOLIC PANEL - Abnormal; Notable for the following:     Glucose 250 (*)     Creatinine 0.66 (*)     Sodium 134 (*)     Calcium 8.2 (*)     BUN/Creatinine Ratio 25.8 (*)     All other components within normal  limits   HEMOGLOBIN A1C - Abnormal; Notable for the following:     Hemoglobin A1C 9.73 (*)     All other components within normal limits   CBC WITH AUTO DIFFERENTIAL - Abnormal; Notable for the following:     Hemoglobin 12.5 (*)     Hematocrit 37.5 (*)     RDW 15.1 (*)     RDW-SD 44.6 (*)     Immature Grans % 1.2 (*)     Immature Grans, Absolute 0.09 (*)     All other components within normal limits   BASIC METABOLIC PANEL - Abnormal; Notable for the following:     Glucose 241 (*)     Creatinine 0.67 (*)     Sodium 134 (*)     All other components within normal limits   CBC WITH AUTO DIFFERENTIAL - Abnormal; Notable for the following:     Hemoglobin 12.9 (*)     Hematocrit 38.4 (*)     RDW 15.1 (*)     RDW-SD 44.8 (*)     Immature Grans % 1.1 (*)     Immature Grans, Absolute 0.09 (*)     All other components within normal limits   POCT GLUCOSE FINGERSTICK - Abnormal; Notable for the following:     Glucose 292 (*)     All other components within normal limits   POCT GLUCOSE FINGERSTICK - Abnormal; Notable for the following:     Glucose 212 (*)     All other components within normal limits   POCT GLUCOSE FINGERSTICK - Abnormal; Notable for the following:     Glucose 231 (*)     All other components within normal limits   POCT GLUCOSE FINGERSTICK - Abnormal; Notable for the following:     Glucose 170 (*)     All other components within normal limits   POCT GLUCOSE FINGERSTICK - Abnormal; Notable for the following:     Glucose 245 (*)     All other components within normal limits   POCT GLUCOSE FINGERSTICK - Abnormal; Notable for the following:     Glucose 254 (*)     All other components within normal limits   TROPONIN (IN-HOUSE) - Normal   BNP (IN-HOUSE) - Normal   D-DIMER, QUANTITATIVE - Normal    Narrative:     Dimer values <500 ng/ml FEU are FDA approved as aid in diagnosis of deep venous thrombosis and pulmonary embolism.  This test should not be used in an exclusion strategy with pretest probability alone.    A  recent guideline regarding diagnosis for pulmonary thomboembolism recommends an adjusted exclusion criterion of age x 10 ng/ml FEU for patients >50 years of age ([employer]ori Intern Med 2015; 163: 701-711).   TROPONIN (IN-HOUSE) - Normal   TROPONIN (IN-HOUSE) - Normal   CHOLESTEROL, TOTAL - Normal   SCAN SLIDE - Normal   TROPONIN (IN-HOUSE) - Normal   RAINBOW DRAW    Narrative:     The following orders were created for panel order [place] Draw.  Procedure                               Abnormality         Status                     ---------                               -----------         ------                     Light Blue Top[[id number]]                                    Final result               Green Top (Gel)[[id number]]                                   Final result               Lavender Top[[id number]]                                      Final result               Gold Top - SST[[id number]]                                    Final result                 Please view results for these tests on the individual orders.   CBC AND DIFFERENTIAL    Narrative:     The following orders were created for panel order CBC & Differential.  Procedure                               Abnormality         Status                     ---------                               -----------         ------                     CBC Auto Differential[[id number]]         Abnormal            Final result                 Please view results for these tests on the individual orders.   LIGHT BLUE TOP   GREEN TOP   LAVENDER TOP   GOLD TOP - SST   CBC AND DIFFERENTIAL    Narrative:     The following orders were created for panel order CBC & Differential.  Procedure                               Abnormality         Status                     ---------                               -----------         ------                     Scan Slide[[id number]]                    Normal              Final result               CBC Auto Differential[[id number]]         Abnormal             Final result                 Please view results for these tests on the individual orders.   CBC AND DIFFERENTIAL    Narrative:     The following orders were created for panel order CBC & Differential.  Procedure                               Abnormality         Status                     ---------                               -----------         ------                     CBC Auto Differential[[id number]]         Abnormal            Final result                 Please view results for these tests on the individual orders.   CBC AND DIFFERENTIAL    Narrative:     The following orders were created for panel order CBC & Differential.  Procedure                               Abnormality         Status                     ---------                               -----------         ------                     CBC Auto Differential[[id number]]         Abnormal            Final result                 Please view results for these tests on the individual orders.       Xr Chest 1 View    Result Date: 3/13/2017  Narrative: PROCEDURE: Chest, portable AP upright at 3:12 PM. INDICATION: Chest pain. COMPARISON: 3/8/2017 and earlier exams. Heart size normal with normal vascularity. Lungs clear. No pleural effusion. Bony structures unremarkable.     Impression: No acute disease. [id number] Electronically signed by:  Julito Carbone MD  3/13/2017 3:45 PM CDT Workstation: [id number]    Xr Chest 1 View    Result Date: 3/8/2017  Narrative: Patient Name:  KEARA ESCOBAR Patient ID:  [id number] Ordering:  ERIC HARRY Attending:  ERIC HARRY Referring:  ERIC HARRY ------------------------------------------------ PORTABLE CHEST HISTORY: Chest pain. Shortness of breath. Portable AP upright film of the chest was obtained at 5:00 PM. COMPARISON: February 1, 2017 EKG leads in place. The lungs are clear of an acute process. The heart is enlarged. The pulmonary vasculature is not increased. No pleural effusion. No pneumothorax. No acute  osseous abnormality.     Impression: CONCLUSION: No Acute Disease Cardiomegaly [id number] Electronically signed by:  Kulwant Currie MD  3/8/2017 5:38 PM CST Workstation: [id number]           MDM    Final diagnoses:   Precordial pain            Simon Roldan MD  03/14/17 1952

## 2017-03-09 LAB
ANION GAP SERPL CALCULATED.3IONS-SCNC: 9 MMOL/L (ref 5–15)
BASOPHILS # BLD AUTO: 0.02 10*3/MM3 (ref 0–0.2)
BASOPHILS NFR BLD AUTO: 0.3 % (ref 0–2)
BUN BLD-MCNC: 17 MG/DL (ref 7–21)
BUN/CREAT SERPL: 25.8 (ref 7–25)
CALCIUM SPEC-SCNC: 8.2 MG/DL (ref 8.4–10.2)
CHLORIDE SERPL-SCNC: 98 MMOL/L (ref 95–110)
CO2 SERPL-SCNC: 27 MMOL/L (ref 22–31)
CREAT BLD-MCNC: 0.66 MG/DL (ref 0.7–1.3)
DEPRECATED RDW RBC AUTO: 44.6 FL (ref 35.1–43.9)
EOSINOPHIL # BLD AUTO: 0.23 10*3/MM3 (ref 0–0.7)
EOSINOPHIL NFR BLD AUTO: 3 % (ref 0–7)
ERYTHROCYTE [DISTWIDTH] IN BLOOD BY AUTOMATED COUNT: 15.1 % (ref 11.5–14.5)
GFR SERPL CREATININE-BSD FRML MDRD: 135 ML/MIN/1.73 (ref 70–162)
GLUCOSE BLD-MCNC: 250 MG/DL (ref 60–100)
GLUCOSE BLDC GLUCOMTR-MCNC: 170 MG/DL (ref 70–130)
GLUCOSE BLDC GLUCOMTR-MCNC: 212 MG/DL (ref 70–130)
GLUCOSE BLDC GLUCOMTR-MCNC: 231 MG/DL (ref 70–130)
HBA1C MFR BLD: 9.73 % (ref 4–5.6)
HCT VFR BLD AUTO: 37.5 % (ref 39–49)
HGB BLD-MCNC: 12.5 G/DL (ref 13.7–17.3)
IMM GRANULOCYTES # BLD: 0.09 10*3/MM3 (ref 0–0.02)
IMM GRANULOCYTES NFR BLD: 1.2 % (ref 0–0.5)
LYMPHOCYTES # BLD AUTO: 1.89 10*3/MM3 (ref 0.6–4.2)
LYMPHOCYTES NFR BLD AUTO: 24.5 % (ref 10–50)
MCH RBC QN AUTO: 27.4 PG (ref 26.5–34)
MCHC RBC AUTO-ENTMCNC: 33.3 G/DL (ref 31.5–36.3)
MCV RBC AUTO: 82.2 FL (ref 80–98)
MONOCYTES # BLD AUTO: 0.75 10*3/MM3 (ref 0–0.9)
MONOCYTES NFR BLD AUTO: 9.7 % (ref 0–12)
NEUTROPHILS # BLD AUTO: 4.72 10*3/MM3 (ref 2–8.6)
NEUTROPHILS NFR BLD AUTO: 61.3 % (ref 37–80)
PLATELET # BLD AUTO: 167 10*3/MM3 (ref 150–450)
PMV BLD AUTO: 9.1 FL (ref 8–12)
POTASSIUM BLD-SCNC: 4 MMOL/L (ref 3.5–5.1)
RBC # BLD AUTO: 4.56 10*6/MM3 (ref 4.37–5.74)
SODIUM BLD-SCNC: 134 MMOL/L (ref 137–145)
TROPONIN I SERPL-MCNC: <0.012 NG/ML
TROPONIN I SERPL-MCNC: <0.012 NG/ML
WBC NRBC COR # BLD: 7.7 10*3/MM3 (ref 3.2–9.8)

## 2017-03-09 PROCEDURE — 99219 PR INITIAL OBSERVATION CARE/DAY 50 MINUTES: CPT | Performed by: INTERNAL MEDICINE

## 2017-03-09 PROCEDURE — 84484 ASSAY OF TROPONIN QUANT: CPT | Performed by: NURSE PRACTITIONER

## 2017-03-09 PROCEDURE — 93010 ELECTROCARDIOGRAM REPORT: CPT | Performed by: INTERNAL MEDICINE

## 2017-03-09 PROCEDURE — 85025 COMPLETE CBC W/AUTO DIFF WBC: CPT | Performed by: NURSE PRACTITIONER

## 2017-03-09 PROCEDURE — 82962 GLUCOSE BLOOD TEST: CPT

## 2017-03-09 PROCEDURE — 63710000001 INSULIN ASPART PER 5 UNITS: Performed by: NURSE PRACTITIONER

## 2017-03-09 PROCEDURE — 93005 ELECTROCARDIOGRAM TRACING: CPT | Performed by: NURSE PRACTITIONER

## 2017-03-09 PROCEDURE — 25010000002 ONDANSETRON PER 1 MG: Performed by: NURSE PRACTITIONER

## 2017-03-09 PROCEDURE — G0378 HOSPITAL OBSERVATION PER HR: HCPCS

## 2017-03-09 PROCEDURE — 96376 TX/PRO/DX INJ SAME DRUG ADON: CPT

## 2017-03-09 PROCEDURE — 83036 HEMOGLOBIN GLYCOSYLATED A1C: CPT | Performed by: NURSE PRACTITIONER

## 2017-03-09 PROCEDURE — 80048 BASIC METABOLIC PNL TOTAL CA: CPT | Performed by: NURSE PRACTITIONER

## 2017-03-09 RX ADMIN — NITROGLYCERIN 0.4 MG: 0.4 TABLET SUBLINGUAL at 05:06

## 2017-03-09 RX ADMIN — ONDANSETRON 4 MG: 2 INJECTION, SOLUTION INTRAMUSCULAR; INTRAVENOUS at 14:19

## 2017-03-09 RX ADMIN — FAMOTIDINE 20 MG: 20 TABLET ORAL at 17:50

## 2017-03-09 RX ADMIN — RANOLAZINE 1000 MG: 500 TABLET, FILM COATED, EXTENDED RELEASE ORAL at 21:23

## 2017-03-09 RX ADMIN — AMLODIPINE BESYLATE 10 MG: 10 TABLET ORAL at 08:16

## 2017-03-09 RX ADMIN — HYDROCODONE BITARTRATE AND ACETAMINOPHEN 1 TABLET: 10; 325 TABLET ORAL at 06:49

## 2017-03-09 RX ADMIN — INSULIN ASPART 4 UNITS: 100 INJECTION, SOLUTION INTRAVENOUS; SUBCUTANEOUS at 21:47

## 2017-03-09 RX ADMIN — INSULIN ASPART 2 UNITS: 100 INJECTION, SOLUTION INTRAVENOUS; SUBCUTANEOUS at 17:50

## 2017-03-09 RX ADMIN — PRASUGREL HYDROCHLORIDE 10 MG: 10 TABLET, FILM COATED ORAL at 08:17

## 2017-03-09 RX ADMIN — PRAVASTATIN SODIUM 80 MG: 40 TABLET ORAL at 21:23

## 2017-03-09 RX ADMIN — LISINOPRIL 20 MG: 20 TABLET ORAL at 08:17

## 2017-03-09 RX ADMIN — HYDROCODONE BITARTRATE AND ACETAMINOPHEN 1 TABLET: 10; 325 TABLET ORAL at 13:36

## 2017-03-09 RX ADMIN — PANTOPRAZOLE SODIUM 40 MG: 40 TABLET, DELAYED RELEASE ORAL at 06:49

## 2017-03-09 RX ADMIN — NITROGLYCERIN 0.4 MG: 0.4 TABLET SUBLINGUAL at 04:55

## 2017-03-09 RX ADMIN — METOPROLOL SUCCINATE 100 MG: 100 TABLET, EXTENDED RELEASE ORAL at 08:17

## 2017-03-09 RX ADMIN — INSULIN ASPART 3 UNITS: 100 INJECTION, SOLUTION INTRAVENOUS; SUBCUTANEOUS at 12:40

## 2017-03-09 RX ADMIN — PRAMIPEXOLE DIHYDROCHLORIDE 2 MG: 1 TABLET ORAL at 21:23

## 2017-03-09 RX ADMIN — RANOLAZINE 1000 MG: 500 TABLET, FILM COATED, EXTENDED RELEASE ORAL at 08:16

## 2017-03-09 RX ADMIN — NITROGLYCERIN 0.4 MG: 0.4 TABLET SUBLINGUAL at 05:00

## 2017-03-09 RX ADMIN — ASPIRIN 81 MG 81 MG: 81 TABLET ORAL at 08:16

## 2017-03-09 RX ADMIN — INSULIN ASPART 6 UNITS: 100 INJECTION, SOLUTION INTRAVENOUS; SUBCUTANEOUS at 08:17

## 2017-03-09 RX ADMIN — NITROGLYCERIN 0.4 MG: 0.4 TABLET SUBLINGUAL at 11:05

## 2017-03-09 RX ADMIN — FAMOTIDINE 20 MG: 20 TABLET ORAL at 08:16

## 2017-03-09 RX ADMIN — NITROGLYCERIN 0.4 MG: 0.4 TABLET SUBLINGUAL at 11:11

## 2017-03-09 RX ADMIN — TRAZODONE HYDROCHLORIDE 300 MG: 150 TABLET ORAL at 21:23

## 2017-03-09 RX ADMIN — ISOSORBIDE MONONITRATE 120 MG: 60 TABLET, EXTENDED RELEASE ORAL at 08:18

## 2017-03-09 NOTE — CONSULTS
Patient Name: Yordy Hansen  Age/Sex: 36 y.o. male  : 1980  MRN: [id number]    Date of Hospital Visit: 3/8/2017  Encounter Provider: Edwige Briceno MD  Referring Provider: Simon Roldan MD         Subjective:       Chief Complaint: Chest pain    History of Present Illness:  Yordy Hansen is a 36 y.o. male presents to the hospital with recurrent chest discomfort.  Patient stated that he was pain-free since his last admission until about 3 days ago when he started to experience heaviness on his chest.  He stated it was so severe that he decided to seek medical attention.  His chest discomfort was associated with shortness of breath and diaphoresis.  His chest discomfort is at rest not exacerbated with movement or emotional stress.        Past Medical History:  Past Medical History   Diagnosis Date   • Abdominal pain    • Acute right otitis media    • Allergic rhinitis    • Asthma    • Backache    • Bronchitis    • Cellulitis of lower leg      Right   • Chest pain    • Chronic back pain    • Chronic pain    • Cough    • Diarrhea    • Dyspnea    • GERD (gastroesophageal reflux disease)    • Headache    • Hip pain    • Hypertension    • Insomnia    • Low back pain    • Lumbosacral strain    • Morbid obesity    • RLS (restless legs syndrome)    • Seizures        Past Surgical History   Procedure Laterality Date   • Transesophageal echocardiogram (travis)       With color flow       Home Medications:    Prescriptions Prior to Admission   Medication Sig Dispense Refill Last Dose   • albuterol (PROVENTIL HFA;VENTOLIN HFA) 108 (90 BASE) MCG/ACT inhaler Inhale 2 puffs as needed for wheezing.   Past Month at Unknown time   • albuterol (PROVENTIL HFA;VENTOLIN HFA) 108 (90 BASE) MCG/ACT inhaler Inhale 2 puffs Every 4 (Four) Hours.   Past Month at Unknown time   • amLODIPine (NORVASC) 10 MG tablet Take 10 mg by mouth daily.   3/7/2017 at Unknown time   • aspirin 81 MG tablet Take 81 mg by mouth.   3/8/2017 at  Unknown time   • Canagliflozin (INVOKANA) 100 MG tablet Take 1 tablet by mouth daily.   3/8/2017 at Unknown time   • isosorbide mononitrate (IMDUR) 60 MG 24 hr tablet Take 120 mg by mouth Daily.   3/8/2017 at Unknown time   • Liraglutide (VICTOZA) 18 MG/3ML solution pen-injector Inject  under the skin.   3/7/2017 at Unknown time   • lisinopril (PRINIVIL,ZESTRIL) 20 MG tablet Take 20 mg by mouth daily.   3/8/2017 at 0900   • lisinopril-hydrochlorothiazide (PRINZIDE,ZESTORETIC) 20-25 MG per tablet Take 1 tablet by mouth.   3/8/2017 at Unknown time   • metFORMIN (GLUCOPHAGE) 1000 MG tablet Take 1,000 mg by mouth 2 (two) times a day with meals.   3/8/2017 at Unknown time   • metoprolol succinate XL (TOPROL-XL) 100 MG 24 hr tablet Take 100 mg by mouth daily.   3/7/2017 at Unknown time   • nitroglycerin (NITROSTAT) 0.4 MG SL tablet Place 1 tablet under the tongue Every 5 (Five) Minutes As Needed for chest pain. 25 tablet 6 3/8/2017 at Unknown time   • pantoprazole (PROTONIX) 40 MG EC tablet    3/8/2017 at Unknown time   • pramipexole (MIRAPEX) 1 MG tablet Take 1 mg by mouth Every Night. Taking 2mg daily   3/7/2017 at Unknown time   • prasugrel (EFFIENT) 10 MG tablet Take 10 mg by mouth.   3/8/2017 at Unknown time   • pravastatin (PRAVACHOL) 40 MG tablet Take 80 mg by mouth Every Night.   3/7/2017 at Unknown time   • ranolazine (RANEXA) 500 MG 12 hr tablet Take 1,000 mg by mouth.   3/8/2017 at Unknown time   • traZODone (DESYREL) 300 MG tablet Take 300 mg by mouth.   3/7/2017 at Unknown time   • esomeprazole (NexIUM) 40 MG capsule Take 40 mg by mouth 2 (two) times a day.   Unknown at Unknown time   • FENOFIBRATE MICRONIZED PO Take 1 tablet by mouth every night.   Unknown at Unknown time   • ipratropium-albuterol (DUO-NEB) 0.5-2.5 mg/mL nebulizer Inhale 3 mL Every 4 (Four) Hours.   Unknown at Unknown time   • metFORMIN (GLUCOPHAGE) 1000 MG tablet Take 1,000 mg by mouth.      • MICROLET LANCETS misc USE TO TEST BLOOD SUGAR  EVERY DAY AND AS NEEDED   More than a month at Unknown time   • ranitidine (ZANTAC) 150 MG tablet Take 150 mg by mouth 2 (two) times a day.   Unknown at Unknown time       Allergies:  Allergies   Allergen Reactions   • Glipizide    • Phenergan [Promethazine Hcl]    • Risperdal [Risperidone]    • Tramadol        Past Social History:  Social History     Social History   • Marital status:      Spouse name: N/A   • Number of children: N/A   • Years of education: N/A     Social History Main Topics   • Smoking status: Former Smoker   • Smokeless tobacco: None   • Alcohol use No   • Drug use: No   • Sexual activity: Not Asked     Other Topics Concern   • None     Social History Narrative       Past Family History:      Review of Systems:   Constitution: No chills, no rigors, no unexplained weight loss or weight gain  Eyes:  No diplopia, no blurred vision, no loss of vision, conjunctiva is pink and sclera is anicteric  ENT:  No tinnitus, no otorrhea, no epistaxis, no sore throat   Respiratory: No cough, no hemoptysis  Cardiovascular: Chest pain  Gastrointestinal: No nausea, no vomiting, no hematemesis, no diarrhea or constipation, no melena  Genitourinary: No frequency of dysuria no hematuria  Integument: positive for  No pruritis and  no skin rash  Hematologic / Lymphatic: No excessive bleeding, easy bruising, fatigue, lymphadenopathy and petechiae  Musculoskeletal: No joint pain, joint stiffness, joint swelling, muscle pain, muscle weakness and neck pain  Neurological: No dizziness, headaches, light headedness, seizures and vertigo  Behavioral/Psych: No depression, homicidal ideations and suicidal ideations  Endocrine: No frequent urination and nocturia, temperature intolerance, weight gain, unintended and weight loss, unintended      Objective:     Objective:  Vital Signs (last 24 hours)       03/08 0700  -  03/09 0659 03/09 0700  -  03/09 0755   Most Recent    Temp (°F) 98 -  98.7       98.2 (36.8)    Heart Rate  72 -  81    70 -  74     70    Resp 20 -  22      22     22    /76 -  (!) 192/91      161/74     161/74    SpO2 (%) 94 -  98      98     98          Body mass index is 65.37 kg/(m^2).  Weight change:           Physical Exam:   General Appearance:    Alert, oriented, cooperative, in no acute distress   Head:    Normocephalic, atraumatic, without obvious abnormality   Eyes:            Lids and lashes normal, conjunctivae and sclerae normal, no   icterus, no pallor   Ears:    Ears appear intact with no abnormalities noted   Throat:   Mucous membranes pink and moist   Neck:   Supple, trachea midline, no carotid bruit, no JVD   Lungs:     Clear to auscultation, respirations regular, even and                   Unlabored. No wheezes, rales, rhonchi    Heart:    Regular rhythm and normal rate, normal S1 and S2, no            murmur, no gallop, no rub, no click   Abdomen:     Normal bowel sounds, no masses, liver and spleen nonpalpable, soft, non-tender, non-distended, no guarding, no rebound tenderness   Genitalia:    Deferred   Extremities:   Moves all extremities well, no edema, no cyanosis, no              Redness, no clubbing   Pulses:   Pulses palpable and equal bilaterally   Skin:   No bleeding, bruising or rash   Neurologic:   Alert and oriented to person, place, and time. No focal neurological deficits       Lab Review:       Results from last 7 days  Lab Units 03/09/17  0720  03/08/17  1620   SODIUM mmol/L 134*  < > 134*   POTASSIUM mmol/L 4.0  < > 4.1   CHLORIDE mmol/L 98  < > 97   TOTAL CO2 mmol/L 27.0  < > 26.0   BUN mg/dL 17  < > 15   CREATININE mg/dL 0.66*  < > 0.65*   CALCIUM mg/dL 8.2*  < > 8.8   BILIRUBIN mg/dL  --   --  0.4   ALK PHOS U/L  --   --  77   ALT (SGPT) U/L  --   --  35   AST (SGOT) U/L  --   --  28   GLUCOSE mg/dL 250*  < > 209*   < > = values in this interval not displayed.    Results from last 7 days  Lab Units 03/09/17  0148 03/08/17 2006 03/08/17  1620   TROPONIN I ng/mL <0.012  <0.012 <0.012           Results from last 7 days  Lab Units 03/09/17  0720   WBC 10*3/mm3 7.70   HEMOGLOBIN g/dL 12.5*   HEMATOCRIT % 37.5*   PLATELETS 10*3/mm3 167               Results from last 7 days  Lab Units 03/08/17  1620   CHOLESTEROL mg/dL 176           Invalid input(s):  T4,  FREET4    EKG:   ECG/EMG Results (last 24 hours)     Procedure Component Value Units Date/Time    ECG 12 Lead [[id number]] Collected:  03/08/17 1944     Updated:  03/08/17 1958    Narrative:       Test Reason : chest painBlood Pressure : **/** mmHGVent. Rate : 082 BPM     Atrial Rate : 082 BPM   P-R Int : 152 ms          QRS Dur : 100 ms    QT Int : 374 ms       P-R-T Axes : 034 058 088 degrees   QTc Int : 436 msNormal sinus rhythmNormal ECGWhen   compared with ECG of 01-FEB-2017 14:34,No significant change was foundReferred By:             Confirmed By:     ECG 12 Lead [[id number]] Collected:  03/08/17 1610     Updated:  03/08/17 2130    ECG 12 Lead [[id number]] Collected:  03/09/17 0701     Updated:  03/09/17 0736    Narrative:       Test Reason : chest painBlood Pressure : **/** mmHGVent. Rate : 068 BPM     Atrial Rate : 068 BPM   P-R Int : 166 ms          QRS Dur : 112 ms    QT Int : 388 ms       P-R-T Axes : 049 060 086 degrees   QTc Int : 412 msNormal sinus rhythmNormal ECGWhen   compared with ECG of 08-MAR-2017 19:44,No significant change was foundReferred By:             Confirmed By:           Imaging:  Imaging Results (last 24 hours)     Procedure Component Value Units Date/Time    XR Chest 1 View [[id number]] Collected:  03/08/17 1737     Updated:  03/08/17 1739    Narrative:       Patient Name:  KEARA ESCOBAR  Patient ID:  [id number]   Ordering:  ERIC HARRY  Attending:  ERIC HARRY  Referring:  ERIC HARRY  ------------------------------------------------    PORTABLE CHEST    HISTORY: Chest pain. Shortness of breath.    Portable AP upright film of the chest was obtained at 5:00 PM.  COMPARISON: February 1, 2017    EKG leads  in place.  The lungs are clear of an acute process.  The heart is enlarged.  The pulmonary vasculature is not increased.  No pleural effusion.  No pneumothorax.  No acute osseous abnormality.      Impression:       CONCLUSION:  No Acute Disease  Cardiomegaly    [id number]    Electronically signed by:  Kulwant Currie MD  3/8/2017 5:38 PM CST  Workstation: [employer]          I personally viewed and interpreted the patient's EKG/Telemetry data.    Assessment:     Active Problems:  1.  Chest pain: Myocardial infarction is ruled out with serial cardiac enzymes and 12-lead EKG  2.  Morbid obesity  3.  Accelerated hypertension  4.  Hyperlipidemia  5.  Coronary artery disease status post stent placement to the LAD status post stent placement 2.5 x 16 mm Promus drug-eluting stent placed at [facility] in [place].  Repeat cardiac catheterization performed July 28, 2016 revealed patent stent in the LAD.            Plan:        Patient most likely is suffering from microvascular disease.  We will continue with medical management.  We will obtain a transthoracic echocardiogram.  I will discuss with patient again in the morning regarding options of treatments.  We will continue to monitor patient on telemetry for another 24 hours.  Patient has been advised on multiple occasions regarding risk factor modification and weight loss.        Edwige Briceno MD  03/09/17  7:55 AM

## 2017-03-09 NOTE — H&P
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  He denies any radiating pains into the neck or jaw, but reports that he sometimes feels \"jabs\" in the left side of his chest.  He denies vomiting but is extremely nauseated. He also reports in the past two months that he is significantly more fatigued than usual and is napping 2-3 times per day which is not his usual.     Concurrent Medical History:  has a past medical history of Abdominal pain; Acute right otitis media; Allergic rhinitis; Asthma; Backache; Bronchitis; Cellulitis of lower leg; Chest pain; Chronic back pain; Chronic pain; Cough; Diarrhea; Dyspnea; GERD (gastroesophageal reflux disease); Headache; Hip pain; Hypertension; Insomnia; Low back pain; Lumbosacral strain; Morbid obesity; RLS (restless legs syndrome); and Seizures.    Past Surgical History:  has a past surgical history that includes transesophageal echocardiogram (travis). cardiac stent (LAD in July 2016 at [place]), tonsillectomy, cholecystectomy    Family History: significant for CAD, CHF, CKD stage 4, and type 2 DM in his mother.  Patient is unsure of any other family history of his father, siblings, etc.  Social History:  reports that he has quit smoking. He does not have any smokeless " tobacco history on file. He reports that he does not drink alcohol or use illicit drugs.    Allergies:   Allergies   Allergen Reactions   • Glipizide    • Phenergan [Promethazine Hcl]    • Risperdal [Risperidone]    • Tramadol        Medications: Scheduled Meds:  aspirin 325 mg Oral Once     Continuous Infusions:   PRN Meds:.sodium chloride    Review of Systems:  Review of Systems   Constitutional: Positive for activity change, diaphoresis and fatigue. Negative for chills and fever.   Respiratory: Positive for chest tightness and shortness of breath. Negative for cough and wheezing.    Cardiovascular: Positive for chest pain. Negative for palpitations and leg swelling.   Gastrointestinal: Positive for nausea. Negative for abdominal pain, constipation, diarrhea and vomiting.   Musculoskeletal: Negative for back pain, gait problem and neck pain.   Skin: Negative for color change and pallor.   Neurological: Negative for dizziness, tremors, weakness, light-headedness and headaches.   Psychiatric/Behavioral: Negative for confusion.      Otherwise complete ROS is negative except as mentioned above.    Physical Exam:   Temp:  [98.7 °F (37.1 °C)] 98.7 °F (37.1 °C)  Heart Rate:  [76-79] 76  Resp:  [20-22] 20  BP: (148-154)/(63-93) 154/69  Physical Exam   Constitutional: He is oriented to person, place, and time. Vital signs are normal. He appears well-developed and well-nourished. He is cooperative.   Morbidly obese   HENT:   Head: Normocephalic and atraumatic.   Eyes: Conjunctivae and EOM are normal. Pupils are equal, round, and reactive to light.   Neck: Neck supple.   Cardiovascular: Normal rate, regular rhythm, normal heart sounds and intact distal pulses.  Exam reveals no gallop and no friction rub.    No murmur heard.  Pulmonary/Chest: Effort normal and breath sounds normal.   Abdominal: Soft. Bowel sounds are normal. He exhibits no distension. There is no tenderness.   Musculoskeletal: Normal range of motion.    Neurological: He is alert and oriented to person, place, and time.   Skin: Skin is warm and dry.   Psychiatric: He has a normal mood and affect. His behavior is normal.         Results Reviewed:  I have personally reviewed current lab, radiology, and data and agree with results.  Lab Results (last 24 hours)     Procedure Component Value Units Date/Time    [place] Draw [[id number]] Collected:  03/08/17 1620    Specimen:  Blood Updated:  03/08/17 1627    Narrative:       The following orders were created for panel order J[place] Draw.  Procedure                               Abnormality         Status                     ---------                               -----------         ------                     Light Blue Top[[id number]]                                    In process                 Green Top (Gel)[[id number]]                                   In process                 Lavender Top[[id number]]                                      In process                 Gold Top - SST[[id number]]                                    In process                   Please view results for these tests on the individual orders.    Green Top (Gel) [[id number]] Collected:  03/08/17 1620    Specimen:  Blood Updated:  03/08/17 1627    Lavender Top [[id number]] Collected:  03/08/17 1620    Specimen:  Blood Updated:  03/08/17 1627    Light Blue Top [[id number]] Collected:  03/08/17 1620    Specimen:  Blood Updated:  03/08/17 1627    Gold Top - SST [[id number]] Collected:  03/08/17 1620    Specimen:  Blood Updated:  03/08/17 1627    CBC & Differential [[id number]] Collected:  03/08/17 1620    Specimen:  Blood Updated:  03/08/17 1639    Narrative:       The following orders were created for panel order CBC & Differential.  Procedure                               Abnormality         Status                     ---------                               -----------         ------                     CBC Auto Differential[[id number]]         Abnormal             Final result                 Please view results for these tests on the individual orders.    CBC Auto Differential [[id number]]  (Abnormal) Collected:  03/08/17 1620    Specimen:  Blood Updated:  03/08/17 1639     WBC 9.37 10*3/mm3      RBC 4.83 10*6/mm3      Hemoglobin 13.4 (L) g/dL      Hematocrit 39.1 %      MCV 81.0 fL      MCH 27.7 pg      MCHC 34.3 g/dL      RDW 14.6 (H) %      RDW-SD 43.0 fl      MPV 9.5 fL      Platelets 181 10*3/mm3      Neutrophil % 69.5 %      Lymphocyte % 17.4 %      Monocyte % 9.7 %      Eosinophil % 2.1 %      Basophil % 0.2 %      Immature Grans % 1.1 (H) %      Neutrophils, Absolute 6.51 10*3/mm3      Lymphocytes, Absolute 1.63 10*3/mm3      Monocytes, Absolute 0.91 (H) 10*3/mm3      Eosinophils, Absolute 0.20 10*3/mm3      Basophils, Absolute 0.02 10*3/mm3      Immature Grans, Absolute 0.10 (H) 10*3/mm3     Comprehensive Metabolic Panel [[id number]]  (Abnormal) Collected:  03/08/17 1620    Specimen:  Blood Updated:  03/08/17 1703     Glucose 209 (H) mg/dL      BUN 15 mg/dL      Creatinine 0.65 (L) mg/dL      Sodium 134 (L) mmol/L      Potassium 4.1 mmol/L      Chloride 97 mmol/L      CO2 26.0 mmol/L      Calcium 8.8 mg/dL      Total Protein 7.6 g/dL      Albumin 3.90 g/dL      ALT (SGPT) 35 U/L      AST (SGOT) 28 U/L      Alkaline Phosphatase 77 U/L      Total Bilirubin 0.4 mg/dL      eGFR Non African Amer 137 mL/min/1.73      Globulin 3.7 (H) gm/dL      A/G Ratio 1.1 g/dL      BUN/Creatinine Ratio 23.1      Anion Gap 11.0 mmol/L     Troponin [[id number]]  (Normal) Collected:  03/08/17 1620    Specimen:  Blood Updated:  03/08/17 1714     Troponin I <0.012 ng/mL     BNP [[id number]]  (Normal) Collected:  03/08/17 1620    Specimen:  Blood Updated:  03/08/17 1714     proBNP 47.9 pg/mL     D-dimer, Quantitative [[id number]]  (Normal) Collected:  03/08/17 1620    Specimen:  Blood Updated:  03/08/17 1741     D-Dimer, Quantitative 355 ng/mL (FEU)     Narrative:       Dimer values <500 ng/ml FEU are  FDA approved as aid in diagnosis of deep venous thrombosis and pulmonary embolism.  This test should not be used in an exclusion strategy with pretest probability alone.    A recent guideline regarding diagnosis for pulmonary thomboembolism recommends an adjusted exclusion criterion of age x 10 ng/ml FEU for patients >50 years of age ([employer]ori Intern Med 2015; 163: 701-711).        Imaging Results (last 24 hours)     Procedure Component Value Units Date/Time    XR Chest 1 View [[id number]] Collected:  03/08/17 1737     Updated:  03/08/17 1739    Narrative:       Patient Name:  KEARA ESCOBAR  Patient ID:  [id number]   Ordering:  ERIC HARRY  Attending:  ERIC HARRY  Referring:  ERIC HARRY  ------------------------------------------------    PORTABLE CHEST    HISTORY: Chest pain. Shortness of breath.    Portable AP upright film of the chest was obtained at 5:00 PM.  COMPARISON: February 1, 2017    EKG leads in place.  The lungs are clear of an acute process.  The heart is enlarged.  The pulmonary vasculature is not increased.  No pleural effusion.  No pneumothorax.  No acute osseous abnormality.      Impression:       CONCLUSION:  No Acute Disease  Cardiomegaly    [id number]    Electronically signed by:  Kulwant Currie MD  3/8/2017 5:38 PM CST  Workstation: [employer]            Assessment:    Hospital Problem List     Precordial pain                Plan:  1. Chest pain r/o ACS: serial cardiac enzymes, continuous telemetry, EKG prn for chest pain, nitro prn for chest pain.  Cardiology consult.  2. Uncontrolled type 2 DM: Sliding scale insulin coverage.  Continue all home meds except those containing metformin in case cardiac cath needed during admission.  3. HTN: continue home BP meds.   4. HLD: statin therapy  5. CAD: continue home ASA, Toprol, Lisinopril, and statin doses.  6. Obstructive sleep apnea:  7. Morbid Obesity: patient counseled on lifestyle modification including exercise, heart healthy diet with carb  monitoring related to diabetes.  Dietitian consult.   8. GERD: On protonix      I discussed the patients findings and my recommendations with the patient and his wife.  Further orders on this patient will depend upon the hospital course.  Code status discussed with patient and he wishes to be full code status.     LALA Wick  03/08/17  6:40 PM

## 2017-03-09 NOTE — PLAN OF CARE
Problem: Patient Care Overview (Adult)  Goal: Plan of Care Review  Outcome: Ongoing (interventions implemented as appropriate)    03/09/17 050   Coping/Psychosocial Response Interventions   Plan Of Care Reviewed With patient   Patient Care Overview   Progress progress toward functional goals as expected   Outcome Evaluation   Outcome Summary/Follow up Plan pt has consult with cardiologist today       Goal: Adult Individualization and Mutuality  Outcome: Ongoing (interventions implemented as appropriate)  Goal: Discharge Needs Assessment  Outcome: Ongoing (interventions implemented as appropriate)    Problem: Acute Coronary Syndrome (ACS) (Adult)  Goal: Signs and Symptoms of Listed Potential Problems Will be Absent or Manageable (Acute Coronary Syndrome)  Outcome: Ongoing (interventions implemented as appropriate)

## 2017-03-09 NOTE — PROGRESS NOTES
"    [facility] Medicine Services  INPATIENT PROGRESS NOTE    Length of Stay: 0  Date of Admission: 3/8/2017  Primary Care Physician: LALA Read    Subjective   Chief Complaint: no complaints  HPI:  36 year old male with past medical history of CAD, HTN, hyperlipidemia, morbid obesity, obstructive sleep apnea, uncontrolled type 2 diabetes, who presented to the ED with complaints of midsternal chest heaviness that felt as if \"someone is sitting on his chest.\" He reports that this type of pain/heaviness has been an ongoing issue for several months but that this episode started yesterday when he was helping 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 He denies any radiating pains into the neck or jaw, but reports that he sometimes feels \"jabs\" in the left side of his chest. He denies vomiting but is extremely nauseated. He also reports in the past two months that he is significantly more fatigued than usual and is napping 2-3 times per day which is not his usual.     Review of Systems   Constitutional: Negative for chills and fever.   Respiratory: Positive for shortness of breath. Negative for cough, chest tightness and wheezing.    Cardiovascular: Positive for chest pain. Negative for palpitations and leg swelling.   Gastrointestinal: Negative for constipation, diarrhea, nausea and vomiting.   Musculoskeletal: Negative for back pain, gait problem and neck pain.   Skin: Negative for color change and pallor.   Neurological: Negative for weakness, light-headedness and headaches.   Psychiatric/Behavioral: Negative for confusion.        All pertinent negatives and positives are as above. All other systems have been reviewed and are negative unless otherwise stated.     Objective    Temp:  [98 °F (36.7 °C)-98.7 °F (37.1 °C)] 98.2 °F (36.8 °C)  Heart Rate:  [67-81] " 67  Resp:  [20-22] 22  BP: (133-192)/(61-93) 143/61    Physical Exam   Constitutional: He is oriented to person, place, and time. He appears well-developed and well-nourished.   Morbidly obese   HENT:   Head: Normocephalic and atraumatic.   Eyes: Conjunctivae are normal.   Neck: Neck supple.   Cardiovascular: Normal rate, regular rhythm, normal heart sounds and intact distal pulses.  Exam reveals no gallop and no friction rub.    No murmur heard.  Pulmonary/Chest: Effort normal and breath sounds normal. No respiratory distress. He has no wheezes. He has no rales. He exhibits no tenderness.   Abdominal: Soft. Bowel sounds are normal. He exhibits no distension. There is no tenderness.   Musculoskeletal: Normal range of motion.   Neurological: He is alert and oriented to person, place, and time.   Skin: Skin is warm and dry.   Psychiatric: He has a normal mood and affect. His behavior is normal.           Results Review:  I have reviewed the labs, radiology results, and diagnostic studies.    Laboratory Data:     Results from last 7 days  Lab Units 03/09/17  0720 03/08/17 2006 03/08/17  1620   SODIUM mmol/L 134* 135* 134*   POTASSIUM mmol/L 4.0 4.0 4.1   CHLORIDE mmol/L 98 98 97   TOTAL CO2 mmol/L 27.0 26.0 26.0   BUN mg/dL 17 14 15   CREATININE mg/dL 0.66* 0.60* 0.65*   GLUCOSE mg/dL 250* 143* 209*   CALCIUM mg/dL 8.2* 8.9 8.8   BILIRUBIN mg/dL  --   --  0.4   ALK PHOS U/L  --   --  77   ALT (SGPT) U/L  --   --  35   AST (SGOT) U/L  --   --  28   ANION GAP mmol/L 9.0 11.0 11.0     Estimated Creatinine Clearance: 279 mL/min (by C-G formula based on Cr of 0.66).            Results from last 7 days  Lab Units 03/09/17  0720 03/08/17 2006 03/08/17  1620   WBC 10*3/mm3 7.70 6.89 9.37   HEMOGLOBIN g/dL 12.5* 12.6* 13.4*   HEMATOCRIT % 37.5* 37.7* 39.1   PLATELETS 10*3/mm3 167 176 181           Culture Data:   No results found for: BLOODCX  No results found for: URINECX  No results found for: RESPCX  No results found for:  WOUNDCX  No results found for: STOOLCX  No components found for: BODYFLD    Radiology Data:   Imaging Results (last 24 hours)     Procedure Component Value Units Date/Time    XR Chest 1 View [[id number]] Collected:  03/08/17 1737     Updated:  03/08/17 1739    Narrative:       Patient Name:  KEARA ESCOBAR  Patient ID:  [id number]   Ordering:  ERIC HARRY  Attending:  ERIC HARRY  Referring:  ERIC HARRY  ------------------------------------------------    PORTABLE CHEST    HISTORY: Chest pain. Shortness of breath.    Portable AP upright film of the chest was obtained at 5:00 PM.  COMPARISON: February 1, 2017    EKG leads in place.  The lungs are clear of an acute process.  The heart is enlarged.  The pulmonary vasculature is not increased.  No pleural effusion.  No pneumothorax.  No acute osseous abnormality.      Impression:       CONCLUSION:  No Acute Disease  Cardiomegaly    [id number]    Electronically signed by:  Kulwant Currie MD  3/8/2017 5:38 PM CST  Workstation: [email or web address]          I have reviewed the patient current medications.     Assessment/Plan     Hospital Problem List     Precordial pain          Plan:    1. Chest pain: ruled out for acute MI with three negative sets of enzymes, Dr. Briceno following.  Echo ordered.  Dr. Briceno notes possible microvascular disease and recommends medical management.   2. HTN: stable  3. HLD: statin  4. CAD: ASA, statin, Ranexa, Imdur, beta blocker  5. Morbid Obesity: counseled on lifestyle modification including diet and exercise, however, nurses report patient was seen eating an entire pizza and drinking a whole 2 Liter of soda last night.      Discharge Planning: I expect patient to be discharged to home in 1 days.    LALA Wick   03/09/17   1:47 PM

## 2017-03-10 VITALS
SYSTOLIC BLOOD PRESSURE: 162 MMHG | OXYGEN SATURATION: 97 % | TEMPERATURE: 98.2 F | HEART RATE: 73 BPM | BODY MASS INDEX: 44.1 KG/M2 | HEIGHT: 71 IN | DIASTOLIC BLOOD PRESSURE: 74 MMHG | RESPIRATION RATE: 20 BRPM | WEIGHT: 315 LBS

## 2017-03-10 LAB
ANION GAP SERPL CALCULATED.3IONS-SCNC: 11 MMOL/L (ref 5–15)
BASOPHILS # BLD AUTO: 0.03 10*3/MM3 (ref 0–0.2)
BASOPHILS NFR BLD AUTO: 0.4 % (ref 0–2)
BUN BLD-MCNC: 13 MG/DL (ref 7–21)
BUN/CREAT SERPL: 19.4 (ref 7–25)
CALCIUM SPEC-SCNC: 8.4 MG/DL (ref 8.4–10.2)
CHLORIDE SERPL-SCNC: 96 MMOL/L (ref 95–110)
CO2 SERPL-SCNC: 27 MMOL/L (ref 22–31)
CREAT BLD-MCNC: 0.67 MG/DL (ref 0.7–1.3)
DEPRECATED RDW RBC AUTO: 44.8 FL (ref 35.1–43.9)
EOSINOPHIL # BLD AUTO: 0.34 10*3/MM3 (ref 0–0.7)
EOSINOPHIL NFR BLD AUTO: 4 % (ref 0–7)
ERYTHROCYTE [DISTWIDTH] IN BLOOD BY AUTOMATED COUNT: 15.1 % (ref 11.5–14.5)
GFR SERPL CREATININE-BSD FRML MDRD: 133 ML/MIN/1.73 (ref 60–162)
GLUCOSE BLD-MCNC: 241 MG/DL (ref 60–100)
GLUCOSE BLDC GLUCOMTR-MCNC: 245 MG/DL (ref 70–130)
GLUCOSE BLDC GLUCOMTR-MCNC: 254 MG/DL (ref 70–130)
HCT VFR BLD AUTO: 38.4 % (ref 39–49)
HGB BLD-MCNC: 12.9 G/DL (ref 13.7–17.3)
IMM GRANULOCYTES # BLD: 0.09 10*3/MM3 (ref 0–0.02)
IMM GRANULOCYTES NFR BLD: 1.1 % (ref 0–0.5)
LYMPHOCYTES # BLD AUTO: 1.77 10*3/MM3 (ref 0.6–4.2)
LYMPHOCYTES NFR BLD AUTO: 20.9 % (ref 10–50)
MCH RBC QN AUTO: 27.6 PG (ref 26.5–34)
MCHC RBC AUTO-ENTMCNC: 33.6 G/DL (ref 31.5–36.3)
MCV RBC AUTO: 82.1 FL (ref 80–98)
MONOCYTES # BLD AUTO: 0.7 10*3/MM3 (ref 0–0.9)
MONOCYTES NFR BLD AUTO: 8.3 % (ref 0–12)
NEUTROPHILS # BLD AUTO: 5.52 10*3/MM3 (ref 2–8.6)
NEUTROPHILS NFR BLD AUTO: 65.3 % (ref 37–80)
PLATELET # BLD AUTO: 175 10*3/MM3 (ref 150–450)
PMV BLD AUTO: 9.5 FL (ref 8–12)
POTASSIUM BLD-SCNC: 4.1 MMOL/L (ref 3.5–5.1)
RBC # BLD AUTO: 4.68 10*6/MM3 (ref 4.37–5.74)
SODIUM BLD-SCNC: 134 MMOL/L (ref 137–145)
WBC NRBC COR # BLD: 8.45 10*3/MM3 (ref 3.2–9.8)

## 2017-03-10 PROCEDURE — 82962 GLUCOSE BLOOD TEST: CPT

## 2017-03-10 PROCEDURE — 80048 BASIC METABOLIC PNL TOTAL CA: CPT | Performed by: NURSE PRACTITIONER

## 2017-03-10 PROCEDURE — 85025 COMPLETE CBC W/AUTO DIFF WBC: CPT | Performed by: NURSE PRACTITIONER

## 2017-03-10 PROCEDURE — 63710000001 INSULIN ASPART PER 5 UNITS: Performed by: NURSE PRACTITIONER

## 2017-03-10 PROCEDURE — G0378 HOSPITAL OBSERVATION PER HR: HCPCS

## 2017-03-10 RX ADMIN — LISINOPRIL 20 MG: 20 TABLET ORAL at 09:01

## 2017-03-10 RX ADMIN — METOPROLOL SUCCINATE 100 MG: 100 TABLET, EXTENDED RELEASE ORAL at 09:01

## 2017-03-10 RX ADMIN — ISOSORBIDE MONONITRATE 120 MG: 60 TABLET, EXTENDED RELEASE ORAL at 09:02

## 2017-03-10 RX ADMIN — PANTOPRAZOLE SODIUM 40 MG: 40 TABLET, DELAYED RELEASE ORAL at 06:16

## 2017-03-10 RX ADMIN — ASPIRIN 81 MG 81 MG: 81 TABLET ORAL at 09:00

## 2017-03-10 RX ADMIN — FAMOTIDINE 20 MG: 20 TABLET ORAL at 09:01

## 2017-03-10 RX ADMIN — AMLODIPINE BESYLATE 10 MG: 10 TABLET ORAL at 09:00

## 2017-03-10 RX ADMIN — PRASUGREL HYDROCHLORIDE 10 MG: 10 TABLET, FILM COATED ORAL at 09:00

## 2017-03-10 RX ADMIN — RANOLAZINE 1000 MG: 500 TABLET, FILM COATED, EXTENDED RELEASE ORAL at 09:00

## 2017-03-10 NOTE — PLAN OF CARE
Problem: Patient Care Overview (Adult)  Goal: Plan of Care Review  Outcome: Ongoing (interventions implemented as appropriate)    03/10/17 0503   Coping/Psychosocial Response Interventions   Plan Of Care Reviewed With patient   Patient Care Overview   Progress improving       Goal: Adult Individualization and Mutuality  Outcome: Ongoing (interventions implemented as appropriate)  Goal: Discharge Needs Assessment  Outcome: Ongoing (interventions implemented as appropriate)    Problem: Acute Coronary Syndrome (ACS) (Adult)  Goal: Signs and Symptoms of Listed Potential Problems Will be Absent or Manageable (Acute Coronary Syndrome)  Outcome: Ongoing (interventions implemented as appropriate)

## 2017-03-10 NOTE — DISCHARGE SUMMARY
[facility] Medicine Services  DISCHARGE SUMMARY       Date of Admission: 3/8/2017  Date of Discharge:  3/10/2017  Primary Care Physician: LALA Read    Presenting Problem/History of Present Illness:  Precordial pain [R07.2]       Final Discharge Diagnoses:  Hospital Problem List     Precordial pain        Hypertension  Morbid obesity    Consults:   Consults     Date and Time Order Name Status Description    3/8/2017 1950 Inpatient Consult to Cardiology Completed     3/8/2017 7414 Hospitalist (on-call MD unless specified)            Procedures Performed:                   Hospital Course:  The patient is a 36 y.o. male who presented to [facility] with chest pain, cardiac enzymes and d-dimer were negative, he had a history of coronary artery disease status post stent placement to the LAD.  He was evaluated by cardiology Dr. Briceno.  Continue medical management, he feels much better his chest pain has resolved, discharge patient home to follow-up with PCP and cardiology as an outpatient.                Condition on Discharge:  Stable        Discharge Disposition:  Home or Self Care    Discharge Medications:   Yordy Hansen   Home Medication Instructions ROCKY:[id number]    Printed on:03/10/17 1124   Medication Information                      albuterol (PROVENTIL HFA;VENTOLIN HFA) 108 (90 BASE) MCG/ACT inhaler  Inhale 2 puffs as needed for wheezing.             albuterol (PROVENTIL HFA;VENTOLIN HFA) 108 (90 BASE) MCG/ACT inhaler  Inhale 2 puffs Every 4 (Four) Hours.             amLODIPine (NORVASC) 10 MG tablet  Take 10 mg by mouth daily.             aspirin 81 MG tablet  Take 81 mg by mouth.             Canagliflozin (INVOKANA) 100 MG tablet  Take 1 tablet by mouth daily.             esomeprazole (NexIUM) 40 MG capsule  Take 40 mg by mouth 2 (two) times a day.             FENOFIBRATE MICRONIZED PO  Take 1 tablet by mouth every night.              ipratropium-albuterol (DUO-NEB) 0.5-2.5 mg/mL nebulizer  Inhale 3 mL Every 4 (Four) Hours.             isosorbide mononitrate (IMDUR) 60 MG 24 hr tablet  Take 120 mg by mouth Daily.             Liraglutide (VICTOZA) 18 MG/3ML solution pen-injector  Inject  under the skin.             lisinopril (PRINIVIL,ZESTRIL) 20 MG tablet  Take 20 mg by mouth daily.             lisinopril-hydrochlorothiazide (PRINZIDE,ZESTORETIC) 20-25 MG per tablet  Take 1 tablet by mouth.             metFORMIN (GLUCOPHAGE) 1000 MG tablet  Take 1,000 mg by mouth 2 (two) times a day with meals.             metFORMIN (GLUCOPHAGE) 1000 MG tablet  Take 1,000 mg by mouth.             metoprolol succinate XL (TOPROL-XL) 100 MG 24 hr tablet  Take 100 mg by mouth daily.             MICROLET LANCETS misc  USE TO TEST BLOOD SUGAR EVERY DAY AND AS NEEDED             nitroglycerin (NITROSTAT) 0.4 MG SL tablet  Place 1 tablet under the tongue Every 5 (Five) Minutes As Needed for chest pain.             pantoprazole (PROTONIX) 40 MG EC tablet               pramipexole (MIRAPEX) 1 MG tablet  Take 1 mg by mouth Every Night. Taking 2mg daily             prasugrel (EFFIENT) 10 MG tablet  Take 10 mg by mouth.             pravastatin (PRAVACHOL) 40 MG tablet  Take 80 mg by mouth Every Night.             ranitidine (ZANTAC) 150 MG tablet  Take 150 mg by mouth 2 (two) times a day.             ranolazine (RANEXA) 500 MG 12 hr tablet  Take 1,000 mg by mouth.             traZODone (DESYREL) 300 MG tablet  Take 300 mg by mouth.                 Discharge Diet:   Diet Instructions     Diet: Cardiac; Thin Liquids, No Restrictions       Discharge Diet:  Cardiac   Fluid Consistency:  Thin Liquids, No Restrictions                 Activity at Discharge:     Discharge Care Plan/Instructions: Recommend weight loss    Follow-up Appointments:   Future Appointments  Date Time Provider Department Center   4/11/2017 11:15 AM Robinson Gan MD [facility] None   6/13/2017 11:45 AM  Edwige Briceno MD [facility] None       Test Results Pending at Discharge:     Trenton Barrett MD  03/10/17  11:24 AM

## 2017-03-10 NOTE — PLAN OF CARE
Problem: Patient Care Overview (Adult)  Goal: Plan of Care Review  Outcome: Outcome(s) achieved Date Met:  03/10/17  Goal: Adult Individualization and Mutuality  Outcome: Outcome(s) achieved Date Met:  03/10/17  Goal: Discharge Needs Assessment  Outcome: Outcome(s) achieved Date Met:  03/10/17    Problem: Acute Coronary Syndrome (ACS) (Adult)  Goal: Signs and Symptoms of Listed Potential Problems Will be Absent or Manageable (Acute Coronary Syndrome)  Outcome: Outcome(s) achieved Date Met:  03/10/17

## 2017-03-13 ENCOUNTER — OFFICE VISIT (OUTPATIENT)
Dept: CARDIOLOGY | Facility: CLINIC | Age: 37
End: 2017-03-13

## 2017-03-13 ENCOUNTER — HOSPITAL ENCOUNTER (OUTPATIENT)
Facility: HOSPITAL | Age: 37
Setting detail: OBSERVATION
Discharge: HOME | End: 2017-03-15
Attending: EMERGENCY MEDICINE | Admitting: INTERNAL MEDICINE

## 2017-03-13 ENCOUNTER — APPOINTMENT (OUTPATIENT)
Dept: GENERAL RADIOLOGY | Facility: HOSPITAL | Age: 37
End: 2017-03-13

## 2017-03-13 VITALS
SYSTOLIC BLOOD PRESSURE: 166 MMHG | BODY MASS INDEX: 44.1 KG/M2 | OXYGEN SATURATION: 98 % | WEIGHT: 315 LBS | HEART RATE: 88 BPM | DIASTOLIC BLOOD PRESSURE: 72 MMHG | HEIGHT: 71 IN

## 2017-03-13 DIAGNOSIS — E66.01 MORBID OBESITY WITH BODY MASS INDEX (BMI) OF 60.0 TO 69.9 IN ADULT (HCC): ICD-10-CM

## 2017-03-13 DIAGNOSIS — I10 ESSENTIAL HYPERTENSION: ICD-10-CM

## 2017-03-13 DIAGNOSIS — I20.0 UNSTABLE ANGINA (HCC): ICD-10-CM

## 2017-03-13 DIAGNOSIS — I25.10 CORONARY ARTERY DISEASE INVOLVING NATIVE CORONARY ARTERY OF NATIVE HEART WITHOUT ANGINA PECTORIS: Primary | ICD-10-CM

## 2017-03-13 DIAGNOSIS — E78.5 HYPERLIPIDEMIA, UNSPECIFIED HYPERLIPIDEMIA TYPE: ICD-10-CM

## 2017-03-13 DIAGNOSIS — R07.9 CHEST PAIN, UNSPECIFIED TYPE: Primary | ICD-10-CM

## 2017-03-13 DIAGNOSIS — R06.09 DYSPNEA ON EXERTION: ICD-10-CM

## 2017-03-13 LAB
ALBUMIN SERPL-MCNC: 3.9 G/DL (ref 3.4–4.8)
ALBUMIN/GLOB SERPL: 1.1 G/DL (ref 1.1–1.8)
ALP SERPL-CCNC: 79 U/L (ref 38–126)
ALT SERPL W P-5'-P-CCNC: 37 U/L (ref 21–72)
ANION GAP SERPL CALCULATED.3IONS-SCNC: 11 MMOL/L (ref 5–15)
APTT PPP: 30.2 SECONDS (ref 20–40.3)
AST SERPL-CCNC: 38 U/L (ref 17–59)
BASOPHILS # BLD AUTO: 0.03 10*3/MM3 (ref 0–0.2)
BASOPHILS NFR BLD AUTO: 0.4 % (ref 0–2)
BILIRUB SERPL-MCNC: 0.4 MG/DL (ref 0.2–1.3)
BUN BLD-MCNC: 9 MG/DL (ref 7–21)
BUN/CREAT SERPL: 14.5 (ref 7–25)
CALCIUM SPEC-SCNC: 8.9 MG/DL (ref 8.4–10.2)
CHLORIDE SERPL-SCNC: 95 MMOL/L (ref 95–110)
CK MB SERPL-CCNC: 2.9 NG/ML (ref 0–5)
CK SERPL-CCNC: 324 U/L (ref 55–170)
CO2 SERPL-SCNC: 30 MMOL/L (ref 22–31)
CREAT BLD-MCNC: 0.62 MG/DL (ref 0.7–1.3)
DEPRECATED RDW RBC AUTO: 45.3 FL (ref 35.1–43.9)
EOSINOPHIL # BLD AUTO: 0.22 10*3/MM3 (ref 0–0.7)
EOSINOPHIL NFR BLD AUTO: 2.7 % (ref 0–7)
ERYTHROCYTE [DISTWIDTH] IN BLOOD BY AUTOMATED COUNT: 15.1 % (ref 11.5–14.5)
GFR SERPL CREATININE-BSD FRML MDRD: 145 ML/MIN/1.73 (ref 70–162)
GLOBULIN UR ELPH-MCNC: 3.5 GM/DL (ref 2.3–3.5)
GLUCOSE BLD-MCNC: 303 MG/DL (ref 60–100)
GLUCOSE BLDC GLUCOMTR-MCNC: 230 MG/DL (ref 70–130)
HCT VFR BLD AUTO: 39.8 % (ref 39–49)
HGB BLD-MCNC: 13.2 G/DL (ref 13.7–17.3)
HOLD SPECIMEN: NORMAL
HOLD SPECIMEN: NORMAL
IMM GRANULOCYTES # BLD: 0.14 10*3/MM3 (ref 0–0.02)
IMM GRANULOCYTES NFR BLD: 1.7 % (ref 0–0.5)
INR PPP: 0.92 (ref 0.8–1.2)
INR PPP: 1.04 (ref 0.8–1.2)
LYMPHOCYTES # BLD AUTO: 1.45 10*3/MM3 (ref 0.6–4.2)
LYMPHOCYTES NFR BLD AUTO: 18.1 % (ref 10–50)
MCH RBC QN AUTO: 27 PG (ref 26.5–34)
MCHC RBC AUTO-ENTMCNC: 33.2 G/DL (ref 31.5–36.3)
MCV RBC AUTO: 81.6 FL (ref 80–98)
MONOCYTES # BLD AUTO: 0.66 10*3/MM3 (ref 0–0.9)
MONOCYTES NFR BLD AUTO: 8.2 % (ref 0–12)
NEUTROPHILS # BLD AUTO: 5.51 10*3/MM3 (ref 2–8.6)
NEUTROPHILS NFR BLD AUTO: 68.9 % (ref 37–80)
NT-PROBNP SERPL-MCNC: 61.9 PG/ML (ref 0–450)
PLATELET # BLD AUTO: 201 10*3/MM3 (ref 150–450)
PMV BLD AUTO: 9.3 FL (ref 8–12)
POTASSIUM BLD-SCNC: 3.9 MMOL/L (ref 3.5–5.1)
PROT SERPL-MCNC: 7.4 G/DL (ref 6.3–8.6)
PROTHROMBIN TIME: 12.4 SECONDS (ref 11.1–15.3)
PROTHROMBIN TIME: 13.6 SECONDS (ref 11.1–15.3)
RBC # BLD AUTO: 4.88 10*6/MM3 (ref 4.37–5.74)
SODIUM BLD-SCNC: 136 MMOL/L (ref 137–145)
TROPONIN I SERPL-MCNC: <0.012 NG/ML
TROPONIN I SERPL-MCNC: <0.012 NG/ML
WBC NRBC COR # BLD: 8.01 10*3/MM3 (ref 3.2–9.8)
WHOLE BLOOD HOLD SPECIMEN: NORMAL
WHOLE BLOOD HOLD SPECIMEN: NORMAL

## 2017-03-13 PROCEDURE — G0378 HOSPITAL OBSERVATION PER HR: HCPCS

## 2017-03-13 PROCEDURE — 63710000001 INSULIN ASPART PER 5 UNITS: Performed by: NURSE PRACTITIONER

## 2017-03-13 PROCEDURE — 85025 COMPLETE CBC W/AUTO DIFF WBC: CPT | Performed by: EMERGENCY MEDICINE

## 2017-03-13 PROCEDURE — 82550 ASSAY OF CK (CPK): CPT | Performed by: EMERGENCY MEDICINE

## 2017-03-13 PROCEDURE — 96375 TX/PRO/DX INJ NEW DRUG ADDON: CPT

## 2017-03-13 PROCEDURE — 83880 ASSAY OF NATRIURETIC PEPTIDE: CPT | Performed by: EMERGENCY MEDICINE

## 2017-03-13 PROCEDURE — 71010 HC CHEST PA OR AP: CPT

## 2017-03-13 PROCEDURE — 99285 EMERGENCY DEPT VISIT HI MDM: CPT

## 2017-03-13 PROCEDURE — 85730 THROMBOPLASTIN TIME PARTIAL: CPT | Performed by: NURSE PRACTITIONER

## 2017-03-13 PROCEDURE — 93010 ELECTROCARDIOGRAM REPORT: CPT | Performed by: INTERNAL MEDICINE

## 2017-03-13 PROCEDURE — 93005 ELECTROCARDIOGRAM TRACING: CPT | Performed by: EMERGENCY MEDICINE

## 2017-03-13 PROCEDURE — 25010000002 FUROSEMIDE PER 20 MG: Performed by: NURSE PRACTITIONER

## 2017-03-13 PROCEDURE — 63710000001 INSULIN DETEMIR PER 5 UNITS: Performed by: NURSE PRACTITIONER

## 2017-03-13 PROCEDURE — 80053 COMPREHEN METABOLIC PANEL: CPT | Performed by: EMERGENCY MEDICINE

## 2017-03-13 PROCEDURE — 85610 PROTHROMBIN TIME: CPT | Performed by: EMERGENCY MEDICINE

## 2017-03-13 PROCEDURE — 99213 OFFICE O/P EST LOW 20 MIN: CPT | Performed by: INTERNAL MEDICINE

## 2017-03-13 PROCEDURE — 85610 PROTHROMBIN TIME: CPT | Performed by: NURSE PRACTITIONER

## 2017-03-13 PROCEDURE — 82962 GLUCOSE BLOOD TEST: CPT

## 2017-03-13 PROCEDURE — 84484 ASSAY OF TROPONIN QUANT: CPT | Performed by: NURSE PRACTITIONER

## 2017-03-13 PROCEDURE — 25010000002 HEPARIN (PORCINE) PER 1000 UNITS: Performed by: NURSE PRACTITIONER

## 2017-03-13 PROCEDURE — 93005 ELECTROCARDIOGRAM TRACING: CPT

## 2017-03-13 PROCEDURE — 84484 ASSAY OF TROPONIN QUANT: CPT | Performed by: EMERGENCY MEDICINE

## 2017-03-13 PROCEDURE — 25010000002 MORPHINE SULFATE (PF) 2 MG/ML SOLUTION: Performed by: NURSE PRACTITIONER

## 2017-03-13 PROCEDURE — 93005 ELECTROCARDIOGRAM TRACING: CPT | Performed by: NURSE PRACTITIONER

## 2017-03-13 PROCEDURE — 82553 CREATINE MB FRACTION: CPT | Performed by: EMERGENCY MEDICINE

## 2017-03-13 PROCEDURE — 96374 THER/PROPH/DIAG INJ IV PUSH: CPT

## 2017-03-13 RX ORDER — AMLODIPINE BESYLATE 10 MG/1
10 TABLET ORAL DAILY
Status: DISCONTINUED | OUTPATIENT
Start: 2017-03-13 | End: 2017-03-15 | Stop reason: HOSPADM

## 2017-03-13 RX ORDER — PRASUGREL 10 MG/1
10 TABLET, FILM COATED ORAL DAILY
Status: DISCONTINUED | OUTPATIENT
Start: 2017-03-13 | End: 2017-03-15 | Stop reason: HOSPADM

## 2017-03-13 RX ORDER — METOPROLOL SUCCINATE 100 MG/1
100 TABLET, EXTENDED RELEASE ORAL DAILY
Status: DISCONTINUED | OUTPATIENT
Start: 2017-03-13 | End: 2017-03-15 | Stop reason: HOSPADM

## 2017-03-13 RX ORDER — TRAZODONE HYDROCHLORIDE 150 MG/1
300 TABLET ORAL NIGHTLY
Status: DISCONTINUED | OUTPATIENT
Start: 2017-03-13 | End: 2017-03-15 | Stop reason: HOSPADM

## 2017-03-13 RX ORDER — LISINOPRIL 20 MG/1
20 TABLET ORAL DAILY
Status: DISCONTINUED | OUTPATIENT
Start: 2017-03-13 | End: 2017-03-15 | Stop reason: HOSPADM

## 2017-03-13 RX ORDER — SODIUM CHLORIDE 0.9 % (FLUSH) 0.9 %
1-10 SYRINGE (ML) INJECTION AS NEEDED
Status: DISCONTINUED | OUTPATIENT
Start: 2017-03-13 | End: 2017-03-15 | Stop reason: HOSPADM

## 2017-03-13 RX ORDER — FUROSEMIDE 10 MG/ML
40 INJECTION INTRAMUSCULAR; INTRAVENOUS 2 TIMES DAILY
Status: DISCONTINUED | OUTPATIENT
Start: 2017-03-13 | End: 2017-03-15 | Stop reason: HOSPADM

## 2017-03-13 RX ORDER — IPRATROPIUM BROMIDE AND ALBUTEROL SULFATE 2.5; .5 MG/3ML; MG/3ML
3 SOLUTION RESPIRATORY (INHALATION) EVERY 4 HOURS PRN
Status: DISCONTINUED | OUTPATIENT
Start: 2017-03-13 | End: 2017-03-15 | Stop reason: HOSPADM

## 2017-03-13 RX ORDER — SODIUM CHLORIDE 0.9 % (FLUSH) 0.9 %
10 SYRINGE (ML) INJECTION AS NEEDED
Status: DISCONTINUED | OUTPATIENT
Start: 2017-03-13 | End: 2017-03-15 | Stop reason: HOSPADM

## 2017-03-13 RX ORDER — ALBUTEROL SULFATE 2.5 MG/3ML
2.5 SOLUTION RESPIRATORY (INHALATION) EVERY 6 HOURS PRN
Status: DISCONTINUED | OUTPATIENT
Start: 2017-03-13 | End: 2017-03-13 | Stop reason: SDUPTHER

## 2017-03-13 RX ORDER — PANTOPRAZOLE SODIUM 40 MG/1
40 TABLET, DELAYED RELEASE ORAL DAILY
Status: DISCONTINUED | OUTPATIENT
Start: 2017-03-13 | End: 2017-03-15 | Stop reason: HOSPADM

## 2017-03-13 RX ORDER — PRAMIPEXOLE DIHYDROCHLORIDE 1 MG/1
1 TABLET ORAL NIGHTLY
Status: DISCONTINUED | OUTPATIENT
Start: 2017-03-13 | End: 2017-03-15 | Stop reason: HOSPADM

## 2017-03-13 RX ORDER — HEPARIN SODIUM 5000 [USP'U]/ML
3000 INJECTION, SOLUTION INTRAVENOUS; SUBCUTANEOUS AS NEEDED
Status: DISCONTINUED | OUTPATIENT
Start: 2017-03-13 | End: 2017-03-15

## 2017-03-13 RX ORDER — DEXTROSE MONOHYDRATE 25 G/50ML
25 INJECTION, SOLUTION INTRAVENOUS
Status: DISCONTINUED | OUTPATIENT
Start: 2017-03-13 | End: 2017-03-15 | Stop reason: HOSPADM

## 2017-03-13 RX ORDER — MORPHINE SULFATE 2 MG/ML
1 INJECTION, SOLUTION INTRAMUSCULAR; INTRAVENOUS EVERY 4 HOURS PRN
Status: DISCONTINUED | OUTPATIENT
Start: 2017-03-13 | End: 2017-03-15 | Stop reason: HOSPADM

## 2017-03-13 RX ORDER — ONDANSETRON 4 MG/1
4 TABLET, ORALLY DISINTEGRATING ORAL EVERY 6 HOURS PRN
Status: DISCONTINUED | OUTPATIENT
Start: 2017-03-13 | End: 2017-03-15 | Stop reason: HOSPADM

## 2017-03-13 RX ORDER — NICOTINE POLACRILEX 4 MG
15 LOZENGE BUCCAL
Status: DISCONTINUED | OUTPATIENT
Start: 2017-03-13 | End: 2017-03-15 | Stop reason: HOSPADM

## 2017-03-13 RX ORDER — HEPARIN SODIUM 5000 [USP'U]/ML
5000 INJECTION, SOLUTION INTRAVENOUS; SUBCUTANEOUS ONCE
Status: COMPLETED | OUTPATIENT
Start: 2017-03-13 | End: 2017-03-13

## 2017-03-13 RX ORDER — RANOLAZINE 500 MG/1
1000 TABLET, EXTENDED RELEASE ORAL 2 TIMES DAILY
Status: DISCONTINUED | OUTPATIENT
Start: 2017-03-13 | End: 2017-03-15 | Stop reason: HOSPADM

## 2017-03-13 RX ORDER — NALOXONE HCL 0.4 MG/ML
0.4 VIAL (ML) INJECTION
Status: DISCONTINUED | OUTPATIENT
Start: 2017-03-13 | End: 2017-03-15 | Stop reason: HOSPADM

## 2017-03-13 RX ORDER — HEPARIN SODIUM 5000 [USP'U]/ML
2000 INJECTION, SOLUTION INTRAVENOUS; SUBCUTANEOUS AS NEEDED
Status: DISCONTINUED | OUTPATIENT
Start: 2017-03-13 | End: 2017-03-15

## 2017-03-13 RX ORDER — ALBUTEROL SULFATE 90 UG/1
2 AEROSOL, METERED RESPIRATORY (INHALATION) AS NEEDED
Status: DISCONTINUED | OUTPATIENT
Start: 2017-03-13 | End: 2017-03-13 | Stop reason: CLARIF

## 2017-03-13 RX ORDER — ONDANSETRON 4 MG/1
4 TABLET, FILM COATED ORAL EVERY 6 HOURS PRN
Status: DISCONTINUED | OUTPATIENT
Start: 2017-03-13 | End: 2017-03-15 | Stop reason: HOSPADM

## 2017-03-13 RX ORDER — ACETAMINOPHEN 325 MG/1
650 TABLET ORAL EVERY 4 HOURS PRN
Status: DISCONTINUED | OUTPATIENT
Start: 2017-03-13 | End: 2017-03-15 | Stop reason: HOSPADM

## 2017-03-13 RX ORDER — ONDANSETRON 2 MG/ML
4 INJECTION INTRAMUSCULAR; INTRAVENOUS EVERY 6 HOURS PRN
Status: DISCONTINUED | OUTPATIENT
Start: 2017-03-13 | End: 2017-03-15 | Stop reason: HOSPADM

## 2017-03-13 RX ORDER — ISOSORBIDE MONONITRATE 60 MG/1
120 TABLET, EXTENDED RELEASE ORAL DAILY
Status: DISCONTINUED | OUTPATIENT
Start: 2017-03-13 | End: 2017-03-15 | Stop reason: HOSPADM

## 2017-03-13 RX ORDER — HEPARIN SODIUM 10000 [USP'U]/100ML
1000 INJECTION, SOLUTION INTRAVENOUS
Status: DISCONTINUED | OUTPATIENT
Start: 2017-03-13 | End: 2017-03-15

## 2017-03-13 RX ORDER — ASPIRIN 325 MG
325 TABLET ORAL DAILY
Status: DISCONTINUED | OUTPATIENT
Start: 2017-03-13 | End: 2017-03-14

## 2017-03-13 RX ORDER — ASPIRIN 325 MG
325 TABLET ORAL ONCE
Status: COMPLETED | OUTPATIENT
Start: 2017-03-13 | End: 2017-03-13

## 2017-03-13 RX ORDER — PRAVASTATIN SODIUM 40 MG
80 TABLET ORAL NIGHTLY
Status: DISCONTINUED | OUTPATIENT
Start: 2017-03-13 | End: 2017-03-15 | Stop reason: HOSPADM

## 2017-03-13 RX ADMIN — PRASUGREL HYDROCHLORIDE 10 MG: 10 TABLET, FILM COATED ORAL at 20:59

## 2017-03-13 RX ADMIN — INSULIN DETEMIR 20 UNITS: 100 INJECTION, SOLUTION SUBCUTANEOUS at 22:03

## 2017-03-13 RX ADMIN — METOPROLOL SUCCINATE 100 MG: 100 TABLET, EXTENDED RELEASE ORAL at 20:54

## 2017-03-13 RX ADMIN — MORPHINE SULFATE 1 MG: 2 INJECTION, SOLUTION INTRAMUSCULAR; INTRAVENOUS at 20:41

## 2017-03-13 RX ADMIN — Medication 10 ML: at 21:51

## 2017-03-13 RX ADMIN — FUROSEMIDE 40 MG: 10 INJECTION, SOLUTION INTRAMUSCULAR; INTRAVENOUS at 20:44

## 2017-03-13 RX ADMIN — RANOLAZINE 1000 MG: 500 TABLET, FILM COATED, EXTENDED RELEASE ORAL at 20:54

## 2017-03-13 RX ADMIN — HEPARIN SODIUM 1000 UNITS/HR: 10000 INJECTION, SOLUTION INTRAVENOUS at 21:57

## 2017-03-13 RX ADMIN — INSULIN ASPART 5 UNITS: 100 INJECTION, SOLUTION INTRAVENOUS; SUBCUTANEOUS at 21:08

## 2017-03-13 RX ADMIN — NITROGLYCERIN 1 INCH: 20 OINTMENT TOPICAL at 15:37

## 2017-03-13 RX ADMIN — AMLODIPINE BESYLATE 10 MG: 10 TABLET ORAL at 20:55

## 2017-03-13 RX ADMIN — PANTOPRAZOLE SODIUM 40 MG: 40 TABLET, DELAYED RELEASE ORAL at 20:54

## 2017-03-13 RX ADMIN — ASPIRIN 325 MG: 325 TABLET, COATED ORAL at 21:05

## 2017-03-13 RX ADMIN — HEPARIN SODIUM 5000 UNITS: 5000 INJECTION, SOLUTION INTRAVENOUS; SUBCUTANEOUS at 21:52

## 2017-03-13 RX ADMIN — LISINOPRIL 20 MG: 20 TABLET ORAL at 20:55

## 2017-03-13 RX ADMIN — ISOSORBIDE MONONITRATE 120 MG: 60 TABLET, EXTENDED RELEASE ORAL at 20:55

## 2017-03-13 RX ADMIN — ASPIRIN 325 MG: 325 TABLET, COATED ORAL at 15:37

## 2017-03-13 RX ADMIN — PRAVASTATIN SODIUM 80 MG: 40 TABLET ORAL at 20:54

## 2017-03-13 RX ADMIN — TRAZODONE HYDROCHLORIDE 300 MG: 150 TABLET ORAL at 20:54

## 2017-03-13 RX ADMIN — PRAMIPEXOLE DIHYDROCHLORIDE 1 MG: 1 TABLET ORAL at 20:53

## 2017-03-13 NOTE — H&P
[facility] Medicine Admission      Date of Admission: 3/13/2017      Primary Care Physician: LALA Read      Chief Complaint: Dyspnea, Chest pressure    HPI: This is a 36 year old male with a significant medical history of CAD s/p stenting to mid LAD who was sent to the ED from his cardiologist's office.  He has been having worsening dyspnea, which is worse on exertion.  He can no longer walk across a room without becoming short of air.  It is associated with chest pressure which is moderate in intensity.  He reports 25 lbs of weight gain over the past week.  He was recently discharged after an observation stay for similar complaints.  No other known aggravating or alleviating factors.  No other associated symptoms.    Concurrent Medical History:  has a past medical history of Abdominal pain; Acute right otitis media; Allergic rhinitis; Asthma; Backache; Bronchitis; Cellulitis of lower leg; Chest pain; Chronic back pain; Chronic pain; Cough; Diarrhea; Dyspnea; GERD (gastroesophageal reflux disease); Headache; Hip pain; Hypertension; Insomnia; Low back pain; Lumbosacral strain; Morbid obesity; RLS (restless legs syndrome); and Seizures.    Past Surgical History:   Past Surgical History   Procedure Laterality Date   • Transesophageal echocardiogram (travis)       With color flow   • Cardiac catheterization       LAD stent       Family History:   Family History   Problem Relation Age of Onset   • Cancer Other    • Coronary artery disease Other    • Diabetes Other    • Heart disease Other    • Hypertension Other        Social History:   Social History     Social History   • Marital status:      Spouse name: N/A   • Number of children: N/A   • Years of education: N/A     Occupational History   • Not on file.     Social History Main Topics   • Smoking status: Former Smoker   • Smokeless tobacco: Not on file   • Alcohol use No   • Drug use: No   • Sexual activity: Not  on file     Other Topics Concern   • Not on file     Social History Narrative   • No narrative on file       Allergies:   Allergies   Allergen Reactions   • Glipizide    • Phenergan [Promethazine Hcl]    • Risperdal [Risperidone]    • Tramadol        Medications:   Ventolin  Norvasc  ASA  Fenofibrate  Imdur  Lisinopril-HCTZ  Toprol  Protonix  Mirapex  Effient  Pravachol  Ranexa  Trazodone  Invokana  Duoneb  Victoza  Metformin  NTG    Review of Systems:  Review of Systems   Constitutional: Negative for chills, fatigue and fever.   Respiratory: Positive for chest tightness and shortness of breath. Negative for cough and wheezing.    Cardiovascular: Positive for chest pain. Negative for palpitations and leg swelling.   Gastrointestinal: Negative for abdominal pain, diarrhea, nausea and vomiting.   Musculoskeletal: Positive for back pain. Negative for neck pain.   Skin: Negative for rash and wound.   Neurological: Negative for dizziness, weakness, light-headedness and headaches.   All other systems reviewed and are negative.     Otherwise complete ROS is negative except as mentioned above.    Physical Exam:   Temp:  [98.3 °F (36.8 °C)] 98.3 °F (36.8 °C)  Heart Rate:  [79-88] 79  Resp:  [26] 26  BP: (128-217)/(59-95) 152/62  Physical Exam   Constitutional: He is oriented to person, place, and time. He appears well-developed and well-nourished. No distress.   Morbidly obese   HENT:   Head: Normocephalic and atraumatic.   Mouth/Throat: Oropharynx is clear and moist.   Eyes: Conjunctivae and EOM are normal.   Neck: Normal range of motion. Neck supple.   Cardiovascular: Normal rate, regular rhythm and intact distal pulses.  Exam reveals no gallop and no friction rub.    No murmur heard.  Pulmonary/Chest: Effort normal. No respiratory distress. He has decreased breath sounds. He has no wheezes. He has no rales. He exhibits no tenderness.   Abdominal: Soft. Bowel sounds are normal. He exhibits no distension. There is no  tenderness.   Musculoskeletal: Normal range of motion. He exhibits no edema or deformity.   Neurological: He is alert and oriented to person, place, and time.   Skin: Skin is warm and dry. He is not diaphoretic. No erythema.   Vitals reviewed.        Results Reviewed:  I have personally reviewed current lab, radiology, and data and agree with results.  Lab Results (last 24 hours)     Procedure Component Value Units Date/Time    Lilly Draw [[id number]] Collected:  03/13/17 1550    Specimen:  Blood Updated:  03/13/17 1559    Narrative:       The following orders were created for panel order Lilly Draw.  Procedure                               Abnormality         Status                     ---------                               -----------         ------                     Light Blue Top[[id number]]                                    In process                 Green Top (Gel)[[id number]]                                   In process                 Lavender Top[[id number]]                                      In process                 Gold Top - SST[[id number]]                                    In process                   Please view results for these tests on the individual orders.    Gold Top - SST [[id number]] Collected:  03/13/17 1550    Specimen:  Blood Updated:  03/13/17 1559    Lavender Top [[id number]] Collected:  03/13/17 1550    Specimen:  Blood Updated:  03/13/17 1559    Light Blue Top [[id number]] Collected:  03/13/17 1550    Specimen:  Blood Updated:  03/13/17 1559    Green Top (Gel) [[id number]] Collected:  03/13/17 1550    Specimen:  Blood Updated:  03/13/17 1559    CBC & Differential [[id number]] Collected:  03/13/17 1550    Specimen:  Blood Updated:  03/13/17 1603    Narrative:       The following orders were created for panel order CBC & Differential.  Procedure                               Abnormality         Status                     ---------                               -----------         ------                      CBC Auto Differential[[id number]]         Abnormal            Final result                 Please view results for these tests on the individual orders.    CBC Auto Differential [[id number]]  (Abnormal) Collected:  03/13/17 1550    Specimen:  Blood Updated:  03/13/17 1603     WBC 8.01 10*3/mm3      RBC 4.88 10*6/mm3      Hemoglobin 13.2 (L) g/dL      Hematocrit 39.8 %      MCV 81.6 fL      MCH 27.0 pg      MCHC 33.2 g/dL      RDW 15.1 (H) %      RDW-SD 45.3 (H) fl      MPV 9.3 fL      Platelets 201 10*3/mm3      Neutrophil % 68.9 %      Lymphocyte % 18.1 %      Monocyte % 8.2 %      Eosinophil % 2.7 %      Basophil % 0.4 %      Immature Grans % 1.7 (H) %      Neutrophils, Absolute 5.51 10*3/mm3      Lymphocytes, Absolute 1.45 10*3/mm3      Monocytes, Absolute 0.66 10*3/mm3      Eosinophils, Absolute 0.22 10*3/mm3      Basophils, Absolute 0.03 10*3/mm3      Immature Grans, Absolute 0.14 (H) 10*3/mm3     Comprehensive Metabolic Panel [[id number]]  (Abnormal) Collected:  03/13/17 1550    Specimen:  Blood Updated:  03/13/17 1619     Glucose 303 (H) mg/dL      BUN 9 mg/dL      Creatinine 0.62 (L) mg/dL      Sodium 136 (L) mmol/L      Potassium 3.9 mmol/L      Chloride 95 mmol/L      CO2 30.0 mmol/L      Calcium 8.9 mg/dL      Total Protein 7.4 g/dL      Albumin 3.90 g/dL      ALT (SGPT) 37 U/L      AST (SGOT) 38 U/L      Alkaline Phosphatase 79 U/L      Total Bilirubin 0.4 mg/dL      eGFR Non African Amer 145 mL/min/1.73      Globulin 3.5 gm/dL      A/G Ratio 1.1 g/dL      BUN/Creatinine Ratio 14.5      Anion Gap 11.0 mmol/L     Troponin [[id number]]  (Normal) Collected:  03/13/17 1550    Specimen:  Blood Updated:  03/13/17 1631     Troponin I <0.012 ng/mL     BNP [[id number]]  (Normal) Collected:  03/13/17 1550    Specimen:  Blood Updated:  03/13/17 1631     proBNP 61.9 pg/mL     CK [[id number]]  (Abnormal) Collected:  03/13/17 1550    Specimen:  Blood Updated:  03/13/17 1642     Creatine Kinase 324 (H) U/L      Protime-INR [[id number]]  (Normal) Collected:  03/13/17 1550    Specimen:  Blood Updated:  03/13/17 1644     Protime 12.4 Seconds      INR 0.92     Narrative:       Therapeutic range for most indications is 2.0-3.0 INR,  or 2.5-3.5 for mechanical heart valves.    CK-MB [[id number]]  (Normal) Collected:  03/13/17 1550    Specimen:  Blood Updated:  03/13/17 1652     CKMB 2.90 ng/mL         Imaging Results (last 24 hours)     Procedure Component Value Units Date/Time    XR Chest 1 View [[id number]] Collected:  03/13/17 1544     Updated:  03/13/17 1546    Narrative:       PROCEDURE: Chest, portable AP upright at 3:12 PM.    INDICATION: Chest pain.    COMPARISON: 3/8/2017 and earlier exams.    Heart size normal with normal vascularity. Lungs clear. No  pleural effusion.    Bony structures unremarkable.      Impression:       No acute disease.    [id number]    Electronically signed by:  Julito Carbone MD  3/13/2017 3:45  PM CDT Workstation: [email or web address]            Assessment:    Principal Problem:    Unstable angina  Active Problems:    Morbid obesity with body mass index (BMI) of 60.0 to 69.9 in adult    Essential hypertension    Hyperlipidemia    Coronary artery disease involving native coronary artery of native heart without angina pectoris    Precordial pain    Chest pain          Plan:  Admit  Continue serial enzymes  Telemetry  ASA/Effient/Beta blocker/Statin  Heparin Drip  Cardiology consult with Dr. Briceno, heart cath planned  Lasix, fluid restrictions, daily weights      I discussed the patients findings and my recommendations with: patient    Artur Denikasie Santana, LALA  03/13/17  5:42 PM

## 2017-03-13 NOTE — PROGRESS NOTES
Chief complaint : Chest pain    History of Present Illness is a very pleasant 76-year-old gentleman who comes today for a follow-up visit.  Patient was recently seen and examined in the hospital when he presented with chest pain.  Myocardial infarction was ruled out with serial cardiac enzymes.  Patient continues to have fatigue and generalized weakness and chest pain despite current maximal medical therapy.       Review of Systems   Constitution: Negative. Negative for decreased appetite, diaphoresis, weakness, night sweats, weight gain and weight loss.   HENT: Negative for headaches, hearing loss, nosebleeds and sore throat.    Eyes: Negative.  Negative for blurred vision and photophobia.   Cardiovascular: Negative for chest pain, claudication, dyspnea on exertion, irregular heartbeat, leg swelling, palpitations, paroxysmal nocturnal dyspnea and syncope.   Respiratory: Negative for cough, hemoptysis, shortness of breath and wheezing.    Endocrine: Negative for cold intolerance, heat intolerance, polydipsia, polyphagia and polyuria.   Hematologic/Lymphatic: Negative.    Skin: Negative for color change, dry skin, flushing, itching and rash.   Musculoskeletal: Negative.  Negative for muscle cramps, muscle weakness and myalgias.   Gastrointestinal: Negative for abdominal pain, change in bowel habit, diarrhea, hematemesis, melena, nausea and vomiting.   Genitourinary: Negative for dysuria, frequency and hematuria.   Neurological: Negative for dizziness, focal weakness, light-headedness, loss of balance, numbness and seizures.   Psychiatric/Behavioral: Negative.  Negative for substance abuse, suicidal ideas and thoughts of violence.   Allergic/Immunologic: Negative.        Past Medical History   Diagnosis Date   • Abdominal pain    • Acute right otitis media    • Allergic rhinitis    • Asthma    • Backache    • Bronchitis    • Cellulitis of lower leg      Right   • Chest pain    • Chronic back pain    • Chronic pain     • Cough    • Diarrhea    • Dyspnea    • GERD (gastroesophageal reflux disease)    • Headache    • Hip pain    • Hypertension    • Insomnia    • Low back pain    • Lumbosacral strain    • Morbid obesity    • RLS (restless legs syndrome)    • Seizures        Family History   Problem Relation Age of Onset   • Cancer Other    • Coronary artery disease Other    • Diabetes Other    • Heart disease Other    • Hypertension Other        Glipizide; Phenergan [promethazine hcl]; Risperdal [risperidone]; and Tramadol     reports that he has quit smoking. He does not have any smokeless tobacco history on file. He reports that he does not drink alcohol or use illicit drugs.    Objective     Vital Signs  Heart Rate:  [88] 88  BP: (166)/(72) 166/72    Physical Exam   Constitutional: He is oriented to person, place, and time. He appears well-developed and well-nourished.   HENT:   Head: Normocephalic and atraumatic.   Eyes: Conjunctivae and EOM are normal. Pupils are equal, round, and reactive to light.   Neck: Neck supple. No JVD present. Carotid bruit is not present. No tracheal deviation and no edema present.   Cardiovascular: Normal rate, regular rhythm, S1 normal, S2 normal, normal heart sounds and intact distal pulses.  Exam reveals no gallop, no S3, no S4 and no friction rub.    No murmur heard.  Pulmonary/Chest: Effort normal and breath sounds normal. He has no wheezes. He has no rales. He exhibits no tenderness.   Abdominal: Bowel sounds are normal. He exhibits no abdominal bruit and no pulsatile midline mass. There is no rebound and no guarding.   Musculoskeletal: Normal range of motion. He exhibits no edema.   Neurological: He is alert and oriented to person, place, and time.   Skin: Skin is warm and dry.   Psychiatric: He has a normal mood and affect.       Procedures    Assessment/Plan     Patient Active Problem List   Diagnosis   • Morbid obesity with body mass index (BMI) of 60.0 to 69.9 in adult   • Essential  hypertension   • Hyperlipidemia   • Coronary artery disease involving native coronary artery of native heart without angina pectoris   • Precordial pain     1. Coronary artery disease involving native coronary artery of native heart without angina pectoris  Patient had a 2.5 x 16 mm Promus drug-eluting stent that was placed in [facility] in [place].  Repeat cardiac catheterization July 20, 2016 was performed at [facility] which revealed patent stent in the LAD.  Patient was recently in the hospital with chest pain.  Myocardial infarction was ruled out with serial cardiac enzymes.    · Plan is to continue with risk factor modification and guideline direct medical therapy.  · Patient has been counseled and encouraged to lose weight.    2. Hyperlipidemia, unspecified hyperlipidemia type    Component      Latest Ref Rng 6/2/2014 9/5/2014 6/17/2016 12/10/2016 12/29/2016   Total Cholesterol      0 - 199 mg/dL 139 166 168 179 175   Triglycerides      20 - 199 mg/dl 286 (H) 206 (H) 207 (H) 114 229 (H)   HDL Cholesterol      60 - 200 mg/dl 28 (L) 29 (L) 28 (L) 35 (L) 30 (L)   LDL Cholesterol      0 - 129 mg/dl 54 96 99 121 99     Component      Latest Ref Rng 3/8/2017   Total Cholesterol      0 - 199 mg/dL 176   Triglycerides      20 - 199 mg/dl    HDL Cholesterol      60 - 200 mg/dl    LDL Cholesterol      0 - 129 mg/dl      · We'll continue with the current dosage of statin.  · Patient has been counseled regarding weight loss and this factor modification.    3. Essential hypertension  Patient's blood pressure is not well controlled.    4. Morbid obesity with body mass index (BMI) of 60.0 to 69.9 in adult  Patient's current body weight is 473 pounds.    Patient's  lean body weight is 209.64 pounds  · Patient is advised to lose at least 264 pounds in the next few years.  · I have advised him to try to lose at least 2 pounds a week.  · I advised him to modify his diet and increase his exercise activity.    I  discussed the patients findings and my recommendations with patient.    Edwige Briceno MD  03/13/17  2:26 PM    EMR Dragon/Transcription disclaimer:   Much of this encounter note is an electronic transcription/translation of spoken language to printed text. The electronic translation of spoken language may permit erroneous, or at times, nonsensical words or phrases to be inadvertently transcribed; Although I have reviewed the note for such errors, some may still exist.

## 2017-03-13 NOTE — ED PROVIDER NOTES
Subjective   HPI Comments: Mr. Hansen notes chest pain, recurrent, for which he saw his cardiologist, Dr. Aldana this afternoon. Patient has had 25 lb weight gain this week with increased BABCOCK.  Patient notes he can no longer cross the room without being short of breath.      Patient is a 36 y.o. male presenting with chest pain.   Chest Pain   Pain location:  Substernal area  Pain quality: aching    Pain radiates to:  Does not radiate  Pain severity:  Moderate  Onset quality:  Gradual  Duration:  2 days  Progression:  Worsening  Chronicity:  Recurrent  Context: breathing (dyspnea, worse on exertion) and at rest    Relieved by:  Nothing  Worsened by:  Exertion and movement  Ineffective treatments:  Nitroglycerin, antacids and rest  Associated symptoms: dizziness, lower extremity edema and weakness    Associated symptoms: no abdominal pain, no altered mental status, no anorexia, no anxiety, no back pain, no claudication, no cough, no diaphoresis, no dysphagia, no fatigue, no fever, no headache, no heartburn, no nausea, no near-syncope, no numbness, no orthopnea, no palpitations, no PND, no shortness of breath, no syncope and no vomiting    Associated symptoms comment:  Recent weight gain 25 lbs in the last week.  Risk factors: coronary artery disease, diabetes mellitus, hypertension, male sex, obesity and smoking        Review of Systems   Constitutional: Negative for appetite change, chills, diaphoresis, fatigue and fever.   HENT: Negative.  Negative for congestion and trouble swallowing.    Eyes: Negative.  Negative for photophobia and visual disturbance.   Respiratory: Negative.  Negative for cough, chest tightness and shortness of breath.    Cardiovascular: Positive for chest pain and leg swelling. Negative for palpitations, orthopnea, claudication, syncope, PND and near-syncope.   Gastrointestinal: Positive for abdominal distention. Negative for abdominal pain, anorexia, constipation, diarrhea, heartburn, nausea  and vomiting.   Endocrine: Negative.    Genitourinary: Negative.  Negative for decreased urine volume, dysuria, flank pain and hematuria.   Musculoskeletal: Negative.  Negative for arthralgias, back pain, myalgias, neck pain and neck stiffness.   Skin: Negative.  Negative for pallor.   Neurological: Positive for dizziness, weakness and light-headedness. Negative for syncope, numbness and headaches.   Psychiatric/Behavioral: Negative.  Negative for confusion and suicidal ideas. The patient is not nervous/anxious.        Past Medical History   Diagnosis Date   • Abdominal pain    • Acute right otitis media    • Allergic rhinitis    • Asthma    • Backache    • Bronchitis    • Cellulitis of lower leg      Right   • Chest pain    • Chronic back pain    • Chronic pain    • Cough    • Diarrhea    • Dyspnea    • GERD (gastroesophageal reflux disease)    • Headache    • Hip pain    • Hypertension    • Insomnia    • Low back pain    • Lumbosacral strain    • Morbid obesity    • RLS (restless legs syndrome)    • Seizures        Allergies   Allergen Reactions   • Glipizide    • Phenergan [Promethazine Hcl]    • Risperdal [Risperidone]    • Tramadol        Past Surgical History   Procedure Laterality Date   • Transesophageal echocardiogram ([facility])       With color flow   • Cardiac catheterization       LAD stent       Family History   Problem Relation Age of Onset   • Cancer Other    • Coronary artery disease Other    • Diabetes Other    • Heart disease Other    • Hypertension Other        Social History     Social History   • Marital status:      Spouse name: N/A   • Number of children: N/A   • Years of education: N/A     Social History Main Topics   • Smoking status: Former Smoker   • Smokeless tobacco: Not on file   • Alcohol use No   • Drug use: No   • Sexual activity: Not on file     Other Topics Concern   • Not on file     Social History Narrative   • No narrative on file           Objective   Physical Exam    Constitutional: He is oriented to person, place, and time. He appears well-developed and well-nourished. No distress.   HENT:   Head: Normocephalic and atraumatic.   Eyes: Conjunctivae and EOM are normal.   Neck: Normal range of motion. Neck supple. No JVD present.   Cardiovascular: Normal rate, regular rhythm, normal heart sounds and intact distal pulses.  Exam reveals no gallop and no friction rub.    No murmur heard.  Pulmonary/Chest: He is in respiratory distress (mild tachypnea and increased work of breathing). He has no wheezes. He has rales (expiratory). He exhibits no tenderness.   Abdominal: Soft. Bowel sounds are normal. He exhibits no distension and no mass. There is no tenderness. There is no rebound and no guarding.   Musculoskeletal: Normal range of motion.   Neurological: He is alert and oriented to person, place, and time.   Skin: Skin is warm and dry.   Psychiatric: He has a normal mood and affect. His behavior is normal. Judgment and thought content normal.   Nursing note and vitals reviewed.      ECG 12 Lead    Date/Time: 3/13/2017 3:02 PM  Performed by: RICARDA SHEETS  Authorized by: RICARDA SHEETS   Interpreted by physician  Comparison: not compared with previous ECG   Previous ECG: no previous ECG available  Rhythm: sinus rhythm  Conduction: conduction normal  ST Segments: ST segments normal  T Waves: T waves normal  Other: no other findings  Clinical impression: normal ECG               ED Course  ED Course      Labs Reviewed   COMPREHENSIVE METABOLIC PANEL - Abnormal; Notable for the following:        Result Value    Glucose 303 (*)     Creatinine 0.62 (*)     Sodium 136 (*)     All other components within normal limits   CBC WITH AUTO DIFFERENTIAL - Abnormal; Notable for the following:     Hemoglobin 13.2 (*)     RDW 15.1 (*)     RDW-SD 45.3 (*)     Immature Grans % 1.7 (*)     Immature Grans, Absolute 0.14 (*)     All other components within normal limits   CK - Abnormal; Notable for  the following:     Creatine Kinase 324 (*)     All other components within normal limits   TROPONIN (IN-HOUSE) - Normal   PROTIME-INR - Normal    Narrative:     Therapeutic range for most indications is 2.0-3.0 INR,  or 2.5-3.5 for mechanical heart valves.   BNP (IN-HOUSE) - Normal   CK MB - Normal   RAINBOW DRAW    Narrative:     The following orders were created for panel order [facility] Draw.  Procedure                               Abnormality         Status                     ---------                               -----------         ------                     Light Blue Top[[id number]]                                    In process                 Green Top (Gel)[[id number]]                                   In process                 Lavender Top[[id number]]                                      In process                 Gold Top - SST[[id number]]                                    In process                   Please view results for these tests on the individual orders.   CBC AND DIFFERENTIAL    Narrative:     The following orders were created for panel order CBC & Differential.  Procedure                               Abnormality         Status                     ---------                               -----------         ------                     CBC Auto Differential[[id number]]         Abnormal            Final result                 Please view results for these tests on the individual orders.   LIGHT BLUE TOP   GREEN TOP   LAVENDER TOP   GOLD TOP - SST       XR Chest 1 View   Final Result   No acute disease.      [id number]      Electronically signed by:  Julito Carbone MD  3/13/2017 3:45   PM CDT [facility]station: [id number]        Known [facility], sent from clinic, not sure if patient was to be a direct admit.  Dr. Aldana called and message left as he had come from that clinic.  No return call as of yet.  As patient is to be cathed in the next 48 hours, patient admitted for obs and cardiology consultation for further  management.  Patient worried about retaining fluid and weight gain.  Patient morbidly obese, no evidence of acute CHF requiring diuresis.              MDM    Final diagnoses:   Chest pain, unspecified type   Dyspnea on exertion            Asim Kumar MD  03/13/17 [id number]

## 2017-03-14 LAB
APTT PPP: 30.8 SECONDS (ref 20–40.3)
APTT PPP: 30.9 SECONDS (ref 20–40.3)
APTT PPP: 31.7 SECONDS (ref 20–40.3)
APTT PPP: 36 SECONDS (ref 20–40.3)
BASOPHILS # BLD AUTO: 0.02 10*3/MM3 (ref 0–0.2)
BASOPHILS NFR BLD AUTO: 0.2 % (ref 0–2)
DEPRECATED RDW RBC AUTO: 46.5 FL (ref 35.1–43.9)
EOSINOPHIL # BLD AUTO: 0.29 10*3/MM3 (ref 0–0.7)
EOSINOPHIL NFR BLD AUTO: 3.4 % (ref 0–7)
ERYTHROCYTE [DISTWIDTH] IN BLOOD BY AUTOMATED COUNT: 15.4 % (ref 11.5–14.5)
GLUCOSE BLDC GLUCOMTR-MCNC: 213 MG/DL (ref 70–130)
GLUCOSE BLDC GLUCOMTR-MCNC: 269 MG/DL (ref 70–130)
GLUCOSE BLDC GLUCOMTR-MCNC: 271 MG/DL (ref 70–130)
HCT VFR BLD AUTO: 38.2 % (ref 39–49)
HGB BLD-MCNC: 12.7 G/DL (ref 13.7–17.3)
IMM GRANULOCYTES # BLD: 0.2 10*3/MM3 (ref 0–0.02)
IMM GRANULOCYTES NFR BLD: 2.3 % (ref 0–0.5)
LYMPHOCYTES # BLD AUTO: 1.99 10*3/MM3 (ref 0.6–4.2)
LYMPHOCYTES NFR BLD AUTO: 23 % (ref 10–50)
MCH RBC QN AUTO: 27.2 PG (ref 26.5–34)
MCHC RBC AUTO-ENTMCNC: 33.2 G/DL (ref 31.5–36.3)
MCV RBC AUTO: 81.8 FL (ref 80–98)
MONOCYTES # BLD AUTO: 0.69 10*3/MM3 (ref 0–0.9)
MONOCYTES NFR BLD AUTO: 8 % (ref 0–12)
NEUTROPHILS # BLD AUTO: 5.45 10*3/MM3 (ref 2–8.6)
NEUTROPHILS NFR BLD AUTO: 63.1 % (ref 37–80)
PLATELET # BLD AUTO: 208 10*3/MM3 (ref 150–450)
PMV BLD AUTO: 9.1 FL (ref 8–12)
RBC # BLD AUTO: 4.67 10*6/MM3 (ref 4.37–5.74)
TROPONIN I SERPL-MCNC: <0.012 NG/ML
TROPONIN I SERPL-MCNC: <0.012 NG/ML
WBC NRBC COR # BLD: 8.64 10*3/MM3 (ref 3.2–9.8)
WHOLE BLOOD HOLD SPECIMEN: NORMAL

## 2017-03-14 PROCEDURE — 93005 ELECTROCARDIOGRAM TRACING: CPT | Performed by: NURSE PRACTITIONER

## 2017-03-14 PROCEDURE — 96376 TX/PRO/DX INJ SAME DRUG ADON: CPT

## 2017-03-14 PROCEDURE — G0378 HOSPITAL OBSERVATION PER HR: HCPCS

## 2017-03-14 PROCEDURE — 25010000002 MORPHINE SULFATE (PF) 2 MG/ML SOLUTION: Performed by: NURSE PRACTITIONER

## 2017-03-14 PROCEDURE — 63710000001 INSULIN ASPART PER 5 UNITS: Performed by: NURSE PRACTITIONER

## 2017-03-14 PROCEDURE — 84484 ASSAY OF TROPONIN QUANT: CPT | Performed by: NURSE PRACTITIONER

## 2017-03-14 PROCEDURE — 82962 GLUCOSE BLOOD TEST: CPT

## 2017-03-14 PROCEDURE — 85025 COMPLETE CBC W/AUTO DIFF WBC: CPT | Performed by: INTERNAL MEDICINE

## 2017-03-14 PROCEDURE — 99225 PR SBSQ OBSERVATION CARE/DAY 25 MINUTES: CPT | Performed by: INTERNAL MEDICINE

## 2017-03-14 PROCEDURE — 93010 ELECTROCARDIOGRAM REPORT: CPT | Performed by: INTERNAL MEDICINE

## 2017-03-14 PROCEDURE — 25010000002 HEPARIN (PORCINE) PER 1000 UNITS: Performed by: NURSE PRACTITIONER

## 2017-03-14 PROCEDURE — 85730 THROMBOPLASTIN TIME PARTIAL: CPT | Performed by: INTERNAL MEDICINE

## 2017-03-14 PROCEDURE — 63710000001 INSULIN DETEMIR PER 5 UNITS: Performed by: NURSE PRACTITIONER

## 2017-03-14 PROCEDURE — 25010000002 FUROSEMIDE PER 20 MG: Performed by: NURSE PRACTITIONER

## 2017-03-14 RX ORDER — ASPIRIN 81 MG/1
81 TABLET ORAL DAILY
Status: DISCONTINUED | OUTPATIENT
Start: 2017-03-14 | End: 2017-03-15 | Stop reason: HOSPADM

## 2017-03-14 RX ADMIN — HEPARIN SODIUM 3000 UNITS: 5000 INJECTION, SOLUTION INTRAVENOUS; SUBCUTANEOUS at 12:02

## 2017-03-14 RX ADMIN — HEPARIN SODIUM 3000 UNITS: 5000 INJECTION, SOLUTION INTRAVENOUS; SUBCUTANEOUS at 05:37

## 2017-03-14 RX ADMIN — INSULIN ASPART 5 UNITS: 100 INJECTION, SOLUTION INTRAVENOUS; SUBCUTANEOUS at 08:30

## 2017-03-14 RX ADMIN — RANOLAZINE 1000 MG: 500 TABLET, FILM COATED, EXTENDED RELEASE ORAL at 17:33

## 2017-03-14 RX ADMIN — ISOSORBIDE MONONITRATE 120 MG: 60 TABLET, EXTENDED RELEASE ORAL at 08:28

## 2017-03-14 RX ADMIN — HEPARIN SODIUM 1000 UNITS/HR: 10000 INJECTION, SOLUTION INTRAVENOUS at 19:15

## 2017-03-14 RX ADMIN — FUROSEMIDE 40 MG: 10 INJECTION, SOLUTION INTRAMUSCULAR; INTRAVENOUS at 17:33

## 2017-03-14 RX ADMIN — METOPROLOL SUCCINATE 100 MG: 100 TABLET, EXTENDED RELEASE ORAL at 08:29

## 2017-03-14 RX ADMIN — INSULIN DETEMIR 20 UNITS: 100 INJECTION, SOLUTION SUBCUTANEOUS at 21:46

## 2017-03-14 RX ADMIN — PRAMIPEXOLE DIHYDROCHLORIDE 1 MG: 1 TABLET ORAL at 21:48

## 2017-03-14 RX ADMIN — PRAVASTATIN SODIUM 80 MG: 40 TABLET ORAL at 21:48

## 2017-03-14 RX ADMIN — ASPIRIN 325 MG: 325 TABLET, COATED ORAL at 08:29

## 2017-03-14 RX ADMIN — AMLODIPINE BESYLATE 10 MG: 10 TABLET ORAL at 08:29

## 2017-03-14 RX ADMIN — INSULIN ASPART 8 UNITS: 100 INJECTION, SOLUTION INTRAVENOUS; SUBCUTANEOUS at 10:46

## 2017-03-14 RX ADMIN — LISINOPRIL 20 MG: 20 TABLET ORAL at 08:29

## 2017-03-14 RX ADMIN — FUROSEMIDE 40 MG: 10 INJECTION, SOLUTION INTRAMUSCULAR; INTRAVENOUS at 08:29

## 2017-03-14 RX ADMIN — RANOLAZINE 1000 MG: 500 TABLET, FILM COATED, EXTENDED RELEASE ORAL at 08:29

## 2017-03-14 RX ADMIN — PRASUGREL HYDROCHLORIDE 10 MG: 10 TABLET, FILM COATED ORAL at 08:29

## 2017-03-14 RX ADMIN — TRAZODONE HYDROCHLORIDE 300 MG: 150 TABLET ORAL at 21:48

## 2017-03-14 RX ADMIN — HEPARIN SODIUM 3000 UNITS: 5000 INJECTION, SOLUTION INTRAVENOUS; SUBCUTANEOUS at 18:30

## 2017-03-14 RX ADMIN — PANTOPRAZOLE SODIUM 40 MG: 40 TABLET, DELAYED RELEASE ORAL at 08:29

## 2017-03-14 RX ADMIN — ACETAMINOPHEN 650 MG: 325 TABLET ORAL at 17:33

## 2017-03-14 RX ADMIN — INSULIN ASPART 8 UNITS: 100 INJECTION, SOLUTION INTRAVENOUS; SUBCUTANEOUS at 17:33

## 2017-03-14 RX ADMIN — MORPHINE SULFATE 1 MG: 2 INJECTION, SOLUTION INTRAMUSCULAR; INTRAVENOUS at 23:31

## 2017-03-14 RX ADMIN — ACETAMINOPHEN 650 MG: 325 TABLET ORAL at 08:50

## 2017-03-14 RX ADMIN — INSULIN ASPART 8 UNITS: 100 INJECTION, SOLUTION INTRAVENOUS; SUBCUTANEOUS at 21:47

## 2017-03-14 NOTE — PROGRESS NOTES
[facility] Medicine Services  INPATIENT PROGRESS NOTE    Length of Stay: 0  Date of Admission: 3/13/2017  Primary Care Physician: LALA Read    Subjective   Chief Complaint: Chest pain and SOA  HPI: 36 year old admitted for unstable angina.  Cardiac enzymes times 3 were negative, EKG did not show acute ischemic changes.  Cardiologist consulted.  Patient has history of CAD with stenting. Last heart cath was in July 20, 2016.    This morning pt complains of SOA has improved a whole lot and chest pain has almost resolved.. Over all improved, But still has SOA with minimal activity.  Scheduled for heart cath tomorrow.      Review of Systems     All pertinent negatives and positives are as above. All other systems have been reviewed and are negative unless otherwise stated.     Objective    Temp:  [97.2 °F (36.2 °C)-99 °F (37.2 °C)] 97.4 °F (36.3 °C)  Heart Rate:  [66-88] 75  Resp:  [18-26] 18  BP: (128-217)/(59-95) 151/90  Physical Exam   Constitutional: He is oriented to person, place, and time. He appears well-developed and well-nourished.   HENT:   Head: Normocephalic and atraumatic.   Eyes: EOM are normal. Pupils are equal, round, and reactive to light.   Cardiovascular: Normal rate, regular rhythm and intact distal pulses.    Pulmonary/Chest: Effort normal and breath sounds normal.   Decreased air movement bilaterally no wheeze heard   Abdominal: Soft. Bowel sounds are normal.   Musculoskeletal: Normal range of motion. He exhibits edema.   Neurological: He is alert and oriented to person, place, and time. He has normal reflexes.   Skin: Skin is warm.   Psychiatric: He has a normal mood and affect. His behavior is normal. Judgment and thought content normal.   Vitals reviewed.    Results Review:  I have reviewed the labs, radiology results, and diagnostic studies.    Laboratory Data:   Lab Results (last 24 hours)     Procedure Component Value Units Date/Time    CBC &  Differential [[id number]] Collected:  03/13/17 1550    Specimen:  Blood Updated:  03/13/17 1603    Narrative:       The following orders were created for panel order CBC & Differential.  Procedure                               Abnormality         Status                     ---------                               -----------         ------                     CBC Auto Differential[[id number]]         Abnormal            Final result                 Please view results for these tests on the individual orders.    CBC Auto Differential [[id number]]  (Abnormal) Collected:  03/13/17 1550    Specimen:  Blood Updated:  03/13/17 1603     WBC 8.01 10*3/mm3      RBC 4.88 10*6/mm3      Hemoglobin 13.2 (L) g/dL      Hematocrit 39.8 %      MCV 81.6 fL      MCH 27.0 pg      MCHC 33.2 g/dL      RDW 15.1 (H) %      RDW-SD 45.3 (H) fl      MPV 9.3 fL      Platelets 201 10*3/mm3      Neutrophil % 68.9 %      Lymphocyte % 18.1 %      Monocyte % 8.2 %      Eosinophil % 2.7 %      Basophil % 0.4 %      Immature Grans % 1.7 (H) %      Neutrophils, Absolute 5.51 10*3/mm3      Lymphocytes, Absolute 1.45 10*3/mm3      Monocytes, Absolute 0.66 10*3/mm3      Eosinophils, Absolute 0.22 10*3/mm3      Basophils, Absolute 0.03 10*3/mm3      Immature Grans, Absolute 0.14 (H) 10*3/mm3     Comprehensive Metabolic Panel [[id number]]  (Abnormal) Collected:  03/13/17 1550    Specimen:  Blood Updated:  03/13/17 1619     Glucose 303 (H) mg/dL      BUN 9 mg/dL      Creatinine 0.62 (L) mg/dL      Sodium 136 (L) mmol/L      Potassium 3.9 mmol/L      Chloride 95 mmol/L      CO2 30.0 mmol/L      Calcium 8.9 mg/dL      Total Protein 7.4 g/dL      Albumin 3.90 g/dL      ALT (SGPT) 37 U/L      AST (SGOT) 38 U/L      Alkaline Phosphatase 79 U/L      Total Bilirubin 0.4 mg/dL      eGFR Non African Amer 145 mL/min/1.73      Globulin 3.5 gm/dL      A/G Ratio 1.1 g/dL      BUN/Creatinine Ratio 14.5      Anion Gap 11.0 mmol/L     Troponin [[id number]]  (Normal) Collected:   03/13/17 1550    Specimen:  Blood Updated:  03/13/17 1631     Troponin I <0.012 ng/mL     BNP [[id number]]  (Normal) Collected:  03/13/17 1550    Specimen:  Blood Updated:  03/13/17 1631     proBNP 61.9 pg/mL     CK [[id number]]  (Abnormal) Collected:  03/13/17 1550    Specimen:  Blood Updated:  03/13/17 1642     Creatine Kinase 324 (H) U/L     Protime-INR [[id number]]  (Normal) Collected:  03/13/17 1550    Specimen:  Blood Updated:  03/13/17 1644     Protime 12.4 Seconds      INR 0.92     Narrative:       Therapeutic range for most indications is 2.0-3.0 INR,  or 2.5-3.5 for mechanical heart valves.    CK-MB [[id number]]  (Normal) Collected:  03/13/17 1550    Specimen:  Blood Updated:  03/13/17 1652     CKMB 2.90 ng/mL     aPTT [[id number]]  (Normal) Collected:  03/13/17 1926    Specimen:  Blood Updated:  03/13/17 1954     PTT 30.2 seconds     Narrative:       The recommended Heparin therapeutic range is 68-97 seconds.    Protime-INR [[id number]]  (Normal) Collected:  03/13/17 1926    Specimen:  Blood Updated:  03/13/17 1954     Protime 13.6 Seconds      INR 1.04     Narrative:       Therapeutic range for most indications is 2.0-3.0 INR,  or 2.5-3.5 for mechanical heart valves.    Light Blue Top [[id number]] Collected:  03/13/17 1550    Specimen:  Blood Updated:  03/13/17 2001     Extra Tube hold for add-on       Auto resulted       Green Top (Gel) [[id number]] Collected:  03/13/17 1550    Specimen:  Blood Updated:  03/13/17 2001     Extra Tube Hold for add-ons.       Auto resulted.       Lavender Top [[id number]] Collected:  03/13/17 1550    Specimen:  Blood Updated:  03/13/17 2001     Extra Tube hold for add-on       Auto resulted       Gold Top - SST [[id number]] Collected:  03/13/17 1550    Specimen:  Blood Updated:  03/13/17 2001     Extra Tube Hold for add-ons.       Auto resulted.       [place] Draw [[id number]] Collected:  03/13/17 1550    Specimen:  Blood Updated:  03/13/17 2001    Narrative:       The following orders were  created for panel order [place] Draw.  Procedure                               Abnormality         Status                     ---------                               -----------         ------                     Light Blue Top[[id number]]                                    Final result               Green Top (Gel)[[id number]]                                   Final result               Lavender Top[[id number]]                                      Final result               Gold Top - SST[[id number]]                                    Final result                 Please view results for these tests on the individual orders.    Troponin [[id number]]  (Normal) Collected:  03/13/17 1926    Specimen:  Blood Updated:  03/13/17 2002     Troponin I <0.012 ng/mL     POC Glucose Fingerstick [[id number]]  (Abnormal) Collected:  03/13/17 2107    Specimen:  Blood Updated:  03/13/17 2128     Glucose 230 (H) mg/dL       Sliding Scale AdminMeter: [id number]: [id number] ALISSON GARNER       Troponin [[id number]]  (Normal) Collected:  03/14/17 0052    Specimen:  Blood Updated:  03/14/17 0140     Troponin I <0.012 ng/mL     aPTT [[id number]]  (Normal) Collected:  03/14/17 0329    Specimen:  Blood Updated:  03/14/17 0432     PTT 30.8 seconds     Narrative:       The recommended Heparin therapeutic range is 68-97 seconds.    POC Glucose Fingerstick [[id number]]  (Abnormal) Collected:  03/14/17 0609    Specimen:  Blood Updated:  03/14/17 0631     Glucose 213 (H) mg/dL       RN NotifiedMeter: [id number]: [id number] JUWAN THOMAS       Extra Tubes [[id number]] Collected:  03/14/17 0752    Specimen:  Blood from Blood, Venous Line Updated:  03/14/17 0814    Narrative:       The following orders were created for panel order Extra Tubes.  Procedure                               Abnormality         Status                     ---------                               -----------         ------                     Lavender Top[[id number]]                                       In process                   Please view results for these tests on the individual orders.    Lavender Top [[id number]] Collected:  03/14/17 0752    Specimen:  Blood Updated:  03/14/17 0814    Troponin [[id number]]  (Normal) Collected:  03/14/17 0751    Specimen:  Blood Updated:  03/14/17 0844     Troponin I <0.012 ng/mL     aPTT [[id number]]  (Normal) Collected:  03/14/17 0751    Specimen:  Blood Updated:  03/14/17 0856     PTT 36.0 seconds     Narrative:       The recommended Heparin therapeutic range is 68-97 seconds.    POC Glucose Fingerstick [[id number]]  (Abnormal) Collected:  03/14/17 1021    Specimen:  Blood Updated:  03/14/17 1044     Glucose 271 (H) mg/dL       RN NotifiedMeter: [id number]: [id number] OLEG PERKINSIA             Culture Data:   No results found for: BLOODCX  No results found for: URINECX  No results found for: RESPCX  No results found for: WOUNDCX  No results found for: STOOLCX  No components found for: BODYFLD    Radiology Data:   Imaging Results (last 24 hours)     Procedure Component Value Units Date/Time    XR Chest 1 View [[id number]] Collected:  03/13/17 1544     Updated:  03/13/17 1546    Narrative:       PROCEDURE: Chest, portable AP upright at 3:12 PM.    INDICATION: Chest pain.    COMPARISON: 3/8/2017 and earlier exams.    Heart size normal with normal vascularity. Lungs clear. No  pleural effusion.    Bony structures unremarkable.      Impression:       No acute disease.    [id number]    Electronically signed by:  Julito Carbone MD  3/13/2017 3:45  PM CDT Workstation: [id number]          I have reviewed the patient current medications.     Scheduled Meds:  amLODIPine 10 mg Oral Daily   aspirin 325 mg Oral Daily   furosemide 40 mg Intravenous BID   insulin aspart 0-14 Units Subcutaneous 4x Daily AC & at Bedtime   insulin detemir 20 Units Subcutaneous Nightly   isosorbide mononitrate 120 mg Oral Daily   lisinopril 20 mg Oral Daily   metoprolol  succinate  mg Oral Daily   pantoprazole 40 mg Oral Daily   pramipexole 1 mg Oral Nightly   prasugrel 10 mg Oral Daily   pravastatin 80 mg Oral Nightly   ranolazine 1,000 mg Oral BID   traZODone 300 mg Oral Nightly     Continuous Infusions:  heparin (porcine) 1,000 Units/hr Last Rate: 1,200 Units/hr (03/14/17 0535)     PRN Meds:.•  acetaminophen  •  dextrose  •  dextrose  •  glucagon (human recombinant)  •  [COMPLETED] heparin (porcine) **AND** heparin (porcine) **AND** heparin (porcine) **AND** heparin (porcine)  •  ipratropium-albuterol  •  Morphine **AND** naloxone  •  ondansetron **OR** ondansetron ODT **OR** ondansetron  •  sodium chloride  •  sodium chloride      Assessment/Plan     Hospital Problem List     * (Principal)Unstable angina    Morbid obesity with body mass index (BMI) of 60.0 to 69.9 in adult    Essential hypertension    Hyperlipidemia    Coronary artery disease involving native coronary artery of native heart without angina pectoris    Precordial pain    Chest pain          Plan:    Echo 1/16/17   CONCLUSION:  1. Limited transthoracic echocardiogram.  2. Normal left ventricular function with an estimated ejection  fraction of 65% without regional wall motion abnormalities.  3. There appears to be at least mildly dilated right ventricle with  normal function.  4. No significant valvular regurgitation or stenosis given the  limitations of the study.    ON heparin drip for unstable angina., On aspirin and effient.    i will discuss with Dr. Briceno    Discharge Planning: I expect patient to be discharged to home in 1-2 days.    Anders Hogue MD   03/14/17   11:00 AM

## 2017-03-14 NOTE — PLAN OF CARE
Problem: Patient Care Overview (Adult)  Goal: Plan of Care Review  Outcome: Ongoing (interventions implemented as appropriate)    03/14/17 0415   Coping/Psychosocial Response Interventions   Plan Of Care Reviewed With patient   Patient Care Overview   Progress no change       Goal: Adult Individualization and Mutuality  Outcome: Ongoing (interventions implemented as appropriate)  Goal: Discharge Needs Assessment  Outcome: Ongoing (interventions implemented as appropriate)    Problem: Acute Coronary Syndrome (ACS) (Adult)  Goal: Signs and Symptoms of Listed Potential Problems Will be Absent or Manageable (Acute Coronary Syndrome)  Outcome: Ongoing (interventions implemented as appropriate)

## 2017-03-14 NOTE — CONSULTS
"Nutrition Services    Patient Name:  Yordy Hansen  YOB: 1980  MRN: [id number]  Admit Date:  3/13/2017        Visited due to BMI of 65.27 which is compatible with morbid obesity.  He is 172% of his IBW.  Provided education on \"The benefits of a Healthy Weight\"  \"Healthy Eating Plan.   Main goal was for pt to eat 3 meals a day.  At this time, he only eats one meal per day.  Rd will monitor prn.        Electronically signed by:  Chiquita Radford RD  03/14/17 12:15 PM   "

## 2017-03-14 NOTE — PLAN OF CARE
Problem: Patient Care Overview (Adult)  Goal: Plan of Care Review  Outcome: Ongoing (interventions implemented as appropriate)    03/14/17 5413   Coping/Psychosocial Response Interventions   Plan Of Care Reviewed With patient   Patient Care Overview   Progress no change   Outcome Evaluation   Outcome Summary/Follow up Plan heart cath tomorrow. encouraged patient to maintain fluid restrictions.        Goal: Adult Individualization and Mutuality  Outcome: Ongoing (interventions implemented as appropriate)  Goal: Discharge Needs Assessment  Outcome: Ongoing (interventions implemented as appropriate)    Problem: Acute Coronary Syndrome (ACS) (Adult)  Goal: Signs and Symptoms of Listed Potential Problems Will be Absent or Manageable (Acute Coronary Syndrome)  Outcome: Ongoing (interventions implemented as appropriate)

## 2017-03-15 VITALS
OXYGEN SATURATION: 93 % | HEART RATE: 76 BPM | RESPIRATION RATE: 22 BRPM | DIASTOLIC BLOOD PRESSURE: 59 MMHG | TEMPERATURE: 97.6 F | WEIGHT: 315 LBS | HEIGHT: 71 IN | SYSTOLIC BLOOD PRESSURE: 128 MMHG | BODY MASS INDEX: 44.1 KG/M2

## 2017-03-15 LAB
ALBUMIN SERPL-MCNC: 3.9 G/DL (ref 3.4–4.8)
ALBUMIN/GLOB SERPL: 1 G/DL (ref 1.1–1.8)
ALP SERPL-CCNC: 87 U/L (ref 38–126)
ALT SERPL W P-5'-P-CCNC: 32 U/L (ref 21–72)
ANION GAP SERPL CALCULATED.3IONS-SCNC: 13 MMOL/L (ref 5–15)
APTT PPP: 30.8 SECONDS (ref 20–40.3)
APTT PPP: 32 SECONDS (ref 20–40.3)
ARTICHOKE IGE QN: 116 MG/DL (ref 1–129)
AST SERPL-CCNC: 32 U/L (ref 17–59)
BASOPHILS # BLD AUTO: 0.02 10*3/MM3 (ref 0–0.2)
BASOPHILS NFR BLD AUTO: 0.2 % (ref 0–2)
BILIRUB SERPL-MCNC: 0.9 MG/DL (ref 0.2–1.3)
BUN BLD-MCNC: 14 MG/DL (ref 7–21)
BUN/CREAT SERPL: 17.9 (ref 7–25)
CALCIUM SPEC-SCNC: 8.8 MG/DL (ref 8.4–10.2)
CHLORIDE SERPL-SCNC: 95 MMOL/L (ref 95–110)
CHOLEST SERPL-MCNC: 187 MG/DL (ref 0–199)
CO2 SERPL-SCNC: 29 MMOL/L (ref 22–31)
CREAT BLD-MCNC: 0.78 MG/DL (ref 0.7–1.3)
DEPRECATED RDW RBC AUTO: 43.9 FL (ref 35.1–43.9)
EOSINOPHIL # BLD AUTO: 0.29 10*3/MM3 (ref 0–0.7)
EOSINOPHIL NFR BLD AUTO: 3 % (ref 0–7)
ERYTHROCYTE [DISTWIDTH] IN BLOOD BY AUTOMATED COUNT: 14.9 % (ref 11.5–14.5)
GFR SERPL CREATININE-BSD FRML MDRD: 111 ML/MIN/1.73 (ref 60–162)
GLOBULIN UR ELPH-MCNC: 3.8 GM/DL (ref 2.3–3.5)
GLUCOSE BLD-MCNC: 173 MG/DL (ref 60–100)
GLUCOSE BLDC GLUCOMTR-MCNC: 197 MG/DL (ref 70–130)
GLUCOSE BLDC GLUCOMTR-MCNC: 211 MG/DL (ref 70–130)
GLUCOSE BLDC GLUCOMTR-MCNC: 220 MG/DL (ref 70–130)
GLUCOSE BLDC GLUCOMTR-MCNC: 264 MG/DL (ref 70–130)
HCT VFR BLD AUTO: 40 % (ref 39–49)
HDLC SERPL-MCNC: 34 MG/DL (ref 60–200)
HGB BLD-MCNC: 13.7 G/DL (ref 13.7–17.3)
IMM GRANULOCYTES # BLD: 0.18 10*3/MM3 (ref 0–0.02)
IMM GRANULOCYTES NFR BLD: 1.9 % (ref 0–0.5)
INR PPP: 0.96 (ref 0.8–1.2)
LDLC/HDLC SERPL: 2.85 {RATIO} (ref 0–3.55)
LYMPHOCYTES # BLD AUTO: 2.13 10*3/MM3 (ref 0.6–4.2)
LYMPHOCYTES NFR BLD AUTO: 22.1 % (ref 10–50)
MAGNESIUM SERPL-MCNC: 2.1 MG/DL (ref 1.6–2.3)
MCH RBC QN AUTO: 27.8 PG (ref 26.5–34)
MCHC RBC AUTO-ENTMCNC: 34.3 G/DL (ref 31.5–36.3)
MCV RBC AUTO: 81.1 FL (ref 80–98)
MONOCYTES # BLD AUTO: 0.89 10*3/MM3 (ref 0–0.9)
MONOCYTES NFR BLD AUTO: 9.2 % (ref 0–12)
NEUTROPHILS # BLD AUTO: 6.13 10*3/MM3 (ref 2–8.6)
NEUTROPHILS NFR BLD AUTO: 63.6 % (ref 37–80)
PLATELET # BLD AUTO: 190 10*3/MM3 (ref 150–450)
PMV BLD AUTO: 9.3 FL (ref 8–12)
POTASSIUM BLD-SCNC: 4.3 MMOL/L (ref 3.5–5.1)
PROT SERPL-MCNC: 7.7 G/DL (ref 6.3–8.6)
PROTHROMBIN TIME: 12.8 SECONDS (ref 11.1–15.3)
RBC # BLD AUTO: 4.93 10*6/MM3 (ref 4.37–5.74)
SODIUM BLD-SCNC: 137 MMOL/L (ref 137–145)
TRIGL SERPL-MCNC: 280 MG/DL (ref 20–199)
TSH SERPL DL<=0.05 MIU/L-ACNC: 2.84 MIU/ML (ref 0.46–4.68)
WBC NRBC COR # BLD: 9.64 10*3/MM3 (ref 3.2–9.8)

## 2017-03-15 PROCEDURE — 25010000002 HEPARIN (PORCINE) PER 1000 UNITS: Performed by: NURSE PRACTITIONER

## 2017-03-15 PROCEDURE — C1894 INTRO/SHEATH, NON-LASER: HCPCS | Performed by: INTERNAL MEDICINE

## 2017-03-15 PROCEDURE — 84443 ASSAY THYROID STIM HORMONE: CPT | Performed by: FAMILY MEDICINE

## 2017-03-15 PROCEDURE — 25010000002 BIVALIRUDIN PER 1 MG: Performed by: INTERNAL MEDICINE

## 2017-03-15 PROCEDURE — 85730 THROMBOPLASTIN TIME PARTIAL: CPT | Performed by: INTERNAL MEDICINE

## 2017-03-15 PROCEDURE — 63710000001 INSULIN ASPART PER 5 UNITS: Performed by: NURSE PRACTITIONER

## 2017-03-15 PROCEDURE — 99217 PR OBSERVATION CARE DISCHARGE MANAGEMENT: CPT | Performed by: INTERNAL MEDICINE

## 2017-03-15 PROCEDURE — 85025 COMPLETE CBC W/AUTO DIFF WBC: CPT | Performed by: FAMILY MEDICINE

## 2017-03-15 PROCEDURE — 94799 UNLISTED PULMONARY SVC/PX: CPT

## 2017-03-15 PROCEDURE — C1769 GUIDE WIRE: HCPCS | Performed by: INTERNAL MEDICINE

## 2017-03-15 PROCEDURE — 83735 ASSAY OF MAGNESIUM: CPT | Performed by: FAMILY MEDICINE

## 2017-03-15 PROCEDURE — 25010000002 FENTANYL CITRATE (PF) 100 MCG/2ML SOLUTION: Performed by: INTERNAL MEDICINE

## 2017-03-15 PROCEDURE — 25010000002 MIDAZOLAM PER 1 MG: Performed by: INTERNAL MEDICINE

## 2017-03-15 PROCEDURE — 25010000002 FUROSEMIDE PER 20 MG: Performed by: NURSE PRACTITIONER

## 2017-03-15 PROCEDURE — 93454 CORONARY ARTERY ANGIO S&I: CPT | Performed by: INTERNAL MEDICINE

## 2017-03-15 PROCEDURE — 25010000002 HEPARIN (PORCINE) PER 1000 UNITS: Performed by: INTERNAL MEDICINE

## 2017-03-15 PROCEDURE — 0 IOPAMIDOL PER 1 ML: Performed by: INTERNAL MEDICINE

## 2017-03-15 PROCEDURE — C1725 CATH, TRANSLUMIN NON-LASER: HCPCS | Performed by: INTERNAL MEDICINE

## 2017-03-15 PROCEDURE — C1887 CATHETER, GUIDING: HCPCS | Performed by: INTERNAL MEDICINE

## 2017-03-15 PROCEDURE — G0378 HOSPITAL OBSERVATION PER HR: HCPCS

## 2017-03-15 PROCEDURE — 80061 LIPID PANEL: CPT | Performed by: FAMILY MEDICINE

## 2017-03-15 PROCEDURE — 82962 GLUCOSE BLOOD TEST: CPT

## 2017-03-15 PROCEDURE — C1874 STENT, COATED/COV W/DEL SYS: HCPCS | Performed by: INTERNAL MEDICINE

## 2017-03-15 PROCEDURE — 92928 PRQ TCAT PLMT NTRAC ST 1 LES: CPT | Performed by: INTERNAL MEDICINE

## 2017-03-15 PROCEDURE — C9600 PERC DRUG-EL COR STENT SING: HCPCS | Performed by: INTERNAL MEDICINE

## 2017-03-15 PROCEDURE — 85610 PROTHROMBIN TIME: CPT | Performed by: INTERNAL MEDICINE

## 2017-03-15 PROCEDURE — 80053 COMPREHEN METABOLIC PANEL: CPT | Performed by: FAMILY MEDICINE

## 2017-03-15 DEVICE — XIENCE ALPINE EVEROLIMUS ELUTING CORONARY STENT SYSTEM 2.50 MM X 18 MM / RAPID-EXCHANGE
Type: IMPLANTABLE DEVICE | Site: CORONARY | Status: FUNCTIONAL
Brand: XIENCE ALPINE

## 2017-03-15 RX ORDER — ONDANSETRON 2 MG/ML
4 INJECTION INTRAMUSCULAR; INTRAVENOUS EVERY 6 HOURS PRN
Status: DISCONTINUED | OUTPATIENT
Start: 2017-03-15 | End: 2017-03-15 | Stop reason: HOSPADM

## 2017-03-15 RX ORDER — MIDAZOLAM HYDROCHLORIDE 1 MG/ML
INJECTION INTRAMUSCULAR; INTRAVENOUS AS NEEDED
Status: DISCONTINUED | OUTPATIENT
Start: 2017-03-15 | End: 2017-03-15 | Stop reason: HOSPADM

## 2017-03-15 RX ORDER — SODIUM CHLORIDE 9 MG/ML
100 INJECTION, SOLUTION INTRAVENOUS CONTINUOUS
Status: DISCONTINUED | OUTPATIENT
Start: 2017-03-15 | End: 2017-03-15 | Stop reason: HOSPADM

## 2017-03-15 RX ORDER — FENTANYL CITRATE 50 UG/ML
INJECTION, SOLUTION INTRAMUSCULAR; INTRAVENOUS AS NEEDED
Status: DISCONTINUED | OUTPATIENT
Start: 2017-03-15 | End: 2017-03-15 | Stop reason: HOSPADM

## 2017-03-15 RX ORDER — SODIUM CHLORIDE 9 MG/ML
3 INJECTION, SOLUTION INTRAVENOUS CONTINUOUS
Status: DISCONTINUED | OUTPATIENT
Start: 2017-03-15 | End: 2017-03-15 | Stop reason: HOSPADM

## 2017-03-15 RX ORDER — LIDOCAINE HYDROCHLORIDE 20 MG/ML
INJECTION, SOLUTION INFILTRATION; PERINEURAL AS NEEDED
Status: DISCONTINUED | OUTPATIENT
Start: 2017-03-15 | End: 2017-03-15 | Stop reason: HOSPADM

## 2017-03-15 RX ADMIN — PANTOPRAZOLE SODIUM 40 MG: 40 TABLET, DELAYED RELEASE ORAL at 08:28

## 2017-03-15 RX ADMIN — INSULIN ASPART 5 UNITS: 100 INJECTION, SOLUTION INTRAVENOUS; SUBCUTANEOUS at 13:39

## 2017-03-15 RX ADMIN — ASPIRIN 81 MG: 81 TABLET, COATED ORAL at 08:28

## 2017-03-15 RX ADMIN — FUROSEMIDE 40 MG: 10 INJECTION, SOLUTION INTRAMUSCULAR; INTRAVENOUS at 18:02

## 2017-03-15 RX ADMIN — PRASUGREL HYDROCHLORIDE 10 MG: 10 TABLET, FILM COATED ORAL at 08:27

## 2017-03-15 RX ADMIN — ISOSORBIDE MONONITRATE 120 MG: 60 TABLET, EXTENDED RELEASE ORAL at 08:28

## 2017-03-15 RX ADMIN — SODIUM CHLORIDE 100 ML/HR: 9 INJECTION, SOLUTION INTRAVENOUS at 13:01

## 2017-03-15 RX ADMIN — AMLODIPINE BESYLATE 10 MG: 10 TABLET ORAL at 08:27

## 2017-03-15 RX ADMIN — INSULIN ASPART 5 UNITS: 100 INJECTION, SOLUTION INTRAVENOUS; SUBCUTANEOUS at 18:02

## 2017-03-15 RX ADMIN — METOPROLOL SUCCINATE 100 MG: 100 TABLET, EXTENDED RELEASE ORAL at 08:28

## 2017-03-15 RX ADMIN — LISINOPRIL 20 MG: 20 TABLET ORAL at 08:28

## 2017-03-15 RX ADMIN — RANOLAZINE 1000 MG: 500 TABLET, FILM COATED, EXTENDED RELEASE ORAL at 18:02

## 2017-03-15 RX ADMIN — SODIUM CHLORIDE 3 ML/KG/HR: 9 INJECTION, SOLUTION INTRAVENOUS at 08:27

## 2017-03-15 RX ADMIN — HEPARIN SODIUM 3000 UNITS: 5000 INJECTION, SOLUTION INTRAVENOUS; SUBCUTANEOUS at 02:04

## 2017-03-15 NOTE — DISCHARGE SUMMARY
Date of Discharge:  3/15/2017    Discharge Diagnosis:   · Unstable angina  · Status post cardiac catheterization and stent placement to the left anterior descending coronary artery  · Morbid obesity  · Essential hypertension    Presenting Problem/History of Present Illness  Dyspnea on exertion [R06.09]  Chest pain, unspecified type [R07.9]       Hospital Course  Patient is a 36 y.o. male presented with to the hospital with a chief complaint of unstable angina.  She had multiple admissions with chest pain.  This time decision was made to proceed with invasive cardiac workup.  Patient underwent diagnostic coronary angiogram which revealed significant in-stent restenosis within the previously placed stent in the LAD.  He underwent successful stenting utilizing 2.5 x 18 mm xience alpine stent.  The stented segment was postdilated with a 3.0 meter balloon at high pressure of 16 lucy.  LONNIE 2 flow was converted to LONNIE-3 flow.  80% in-stent restenosis was reduced to 0%.  Patient currently has normal symptoms of chest pain or chest discomfort.  He is able to walk in the hallway with no shortness of breath.    Procedures Performed  Procedure(s):  Left Heart Cath  Stent SOPHIA coronary       Consults:   Consults     Date and Time Order Name Status Description    3/13/2017 1724 Cardiology - Primary (on-call MD unless specified) Completed     3/13/2017 1724 Hospitalist (on-call MD unless specified)      3/8/2017 1950 Inpatient Consult to Cardiology Completed           Pertinent Test Results:     Lab Results (last 24 hours)     Procedure Component Value Units Date/Time    aPTT [[id number]]  (Normal) Collected:  03/15/17 0058    Specimen:  Blood Updated:  03/15/17 0147     PTT 30.8 seconds     Narrative:       The recommended Heparin therapeutic range is 68-97 seconds.    POC Glucose Fingerstick [[id number]]  (Abnormal) Collected:  03/14/17 1923    Specimen:  Blood Updated:  03/15/17 0514     Glucose 264 (H) mg/dL       RN  NotifiedMeter: [id number]: [id number] BELA LAWSON       CBC & Differential [[id number]] Collected:  03/15/17 0527    Specimen:  Blood Updated:  03/15/17 0604    Narrative:       The following orders were created for panel order CBC & Differential.  Procedure                               Abnormality         Status                     ---------                               -----------         ------                     CBC Auto Differential[[id number]]         Abnormal            Final result                 Please view results for these tests on the individual orders.    CBC Auto Differential [[id number]]  (Abnormal) Collected:  03/15/17 0527    Specimen:  Blood Updated:  03/15/17 0604     WBC 9.64 10*3/mm3      RBC 4.93 10*6/mm3      Hemoglobin 13.7 g/dL      Hematocrit 40.0 %      MCV 81.1 fL      MCH 27.8 pg      MCHC 34.3 g/dL      RDW 14.9 (H) %      RDW-SD 43.9 fl      MPV 9.3 fL      Platelets 190 10*3/mm3      Neutrophil % 63.6 %      Lymphocyte % 22.1 %      Monocyte % 9.2 %      Eosinophil % 3.0 %      Basophil % 0.2 %      Immature Grans % 1.9 (H) %      Neutrophils, Absolute 6.13 10*3/mm3      Lymphocytes, Absolute 2.13 10*3/mm3      Monocytes, Absolute 0.89 10*3/mm3      Eosinophils, Absolute 0.29 10*3/mm3      Basophils, Absolute 0.02 10*3/mm3      Immature Grans, Absolute 0.18 (H) 10*3/mm3     Magnesium [[id number]]  (Normal) Collected:  03/15/17 0527    Specimen:  Blood Updated:  03/15/17 0615     Magnesium 2.1 mg/dL     Comprehensive Metabolic Panel [[id number]]  (Abnormal) Collected:  03/15/17 0527    Specimen:  Blood Updated:  03/15/17 0624     Glucose 173 (H) mg/dL      BUN 14 mg/dL      Creatinine 0.78 mg/dL      Sodium 137 mmol/L      Potassium 4.3 mmol/L      Chloride 95 mmol/L      CO2 29.0 mmol/L      Calcium 8.8 mg/dL      Total Protein 7.7 g/dL      Albumin 3.90 g/dL      ALT (SGPT) 32 U/L      AST (SGOT) 32 U/L      Alkaline Phosphatase 87 U/L      Total Bilirubin 0.9 mg/dL      eGFR Non   Amer 111 mL/min/1.73      Globulin 3.8 (H) gm/dL      A/G Ratio 1.0 (L) g/dL      BUN/Creatinine Ratio 17.9      Anion Gap 13.0 mmol/L     Lipid Panel [[id number]]  (Abnormal) Collected:  03/15/17 0527    Specimen:  Blood Updated:  03/15/17 0625     Total Cholesterol 187 mg/dL      Triglycerides 280 (H) mg/dL      HDL Cholesterol 34 (L) mg/dL      LDL Cholesterol  116 mg/dL      LDL/HDL Ratio 2.85     POC Glucose Fingerstick [[id number]]  (Abnormal) Collected:  03/15/17 0613    Specimen:  Blood Updated:  03/15/17 0644     Glucose 197 (H) mg/dL       RN NotifiedMeter: [id number]: [id number] JUWAN THOMAS       TSH [[id number]]  (Normal) Collected:  03/15/17 0527    Specimen:  Blood Updated:  03/15/17 0645     TSH 2.840 mIU/mL     aPTT [[id number]]  (Normal) Collected:  03/15/17 0736    Specimen:  Blood Updated:  03/15/17 0819     PTT 32.0 seconds     Narrative:       The recommended Heparin therapeutic range is 68-97 seconds.    Protime-INR [[id number]]  (Normal) Collected:  03/15/17 0736    Specimen:  Blood Updated:  03/15/17 0905     Protime 12.8 Seconds      INR 0.96     Narrative:       Therapeutic range for most indications is 2.0-3.0 INR,  or 2.5-3.5 for mechanical heart valves.    POC Glucose Fingerstick [[id number]]  (Abnormal) Collected:  03/15/17 1320    Specimen:  Blood Updated:  03/15/17 1333     Glucose 211 (H) mg/dL       RN NotifiedMeter: [id number]: [id number] ELBA PETERSEN       POC Glucose Fingerstick [[id number]]  (Abnormal) Collected:  03/15/17 1542    Specimen:  Blood Updated:  03/15/17 1608     Glucose 220 (H) mg/dL       RN NotifiedMeter: [id number]: [id number] ELBA PETERSEN             Imaging Results (last 24 hours)     ** No results found for the last 24 hours. **          ECG/EMG Results (last 24 hours)     Procedure Component Value Units Date/Time    ECG 12 Lead [[id number]] Collected:  03/13/17 1502     Updated:  03/14/17 2104    Narrative:       Test Reason :  SOABlood Pressure : **/** mmHGVent. Rate : 077 BPM     Atrial Rate : 077 BPM   P-R Int : 142 ms          QRS Dur : 096 ms    QT Int : 364 ms       P-R-T Axes : 072 072 064 degrees   QTc Int : 411 msNormal sinus rhythmNormal ECGConfirmed by   LIZET VALERIO MD ([id number]),  JACKLYN ANNA ([id number]) on3/14/2017 9:08:56 PMReferred By:  KORTNEY           Confirmed By:LIZET VALERIO MD    ECG 12 Lead [[id number]] Collected:  03/13/17 1944     Updated:  03/14/17 2118    Narrative:       Test Reason : anginaBlood Pressure : **/** mmHGVent. Rate : 081 BPM     Atrial Rate : 081 BPM   P-R Int : 152 ms          QRS Dur : 110 ms    QT Int : 380 ms       P-R-T Axes : 045 056 070 degrees   QTc Int : 441 msNormal sinus rhythmNormal ECGWhen   compared with ECG of 13-MAR-2017 15:02,No significant change was foundConfirmed by LIZET VALERIO MD ([id number]),  JACLKYN ANNA ([id number]) on3/14/2017 9:18:24 PMReferred By:             Confirmed By:LIZET VALERIO MD    ECG 12 Lead [[id number]] Collected:  03/14/17 0602     Updated:  03/14/17 2138    Narrative:       Test Reason : anginaBlood Pressure : **/** mmHGVent. Rate : 072 BPM     Atrial Rate : 072 BPM   P-R Int : 156 ms          QRS Dur : 100 ms    QT Int : 388 ms       P-R-T Axes : 055 065 081 degrees   QTc Int : 424 msNormal sinus rhythmNormal ECGWhen   compared with ECG of 13-MAR-2017 19:44,No significant change was foundConfirmed by LIZET VALERIO MD ([id number]),  JACKLYN ANNA (81) on3/14/2017 9:38:32 PMReferred By:             Confirmed By:LIZET VALERIO MD    SCANNED EKG [[id number]] Resulted:  03/13/17      Updated:  03/15/17 1415          Condition on Discharge:  Stable    Vital Signs  Temp:  [97.2 °F (36.2 °C)-98.4 °F (36.9 °C)] 97.6 °F (36.4 °C)  Heart Rate:  [70-79] 79  Resp:  [18-22] 22  BP: (124-158)/(59-79) 128/59    Discharge Disposition  Home or Self Care    Discharge Medications   Yordy Hansen   Home Medication Instructions ROCKY:[id number]    Printed on:03/15/17 9392    Medication Information                      albuterol (PROVENTIL HFA;VENTOLIN HFA) 108 (90 BASE) MCG/ACT inhaler  Inhale 2 puffs as needed for wheezing.             amLODIPine (NORVASC) 10 MG tablet  Take 10 mg by mouth daily.             aspirin 81 MG tablet  Take 81 mg by mouth Daily.             Canagliflozin (INVOKANA) 100 MG tablet  Take 1 tablet by mouth daily.             FENOFIBRATE MICRONIZED PO  Take 1 tablet by mouth every night.             ipratropium-albuterol (DUO-NEB) 0.5-2.5 mg/mL nebulizer  Inhale 3 mL Every 4 (Four) Hours As Needed.             isosorbide mononitrate (IMDUR) 60 MG 24 hr tablet  Take 120 mg by mouth Daily.             Liraglutide (VICTOZA) 18 MG/3ML solution pen-injector  Inject  under the skin.             lisinopril (PRINIVIL,ZESTRIL) 20 MG tablet  Take 20 mg by mouth daily.             lisinopril-hydrochlorothiazide (PRINZIDE,ZESTORETIC) 20-25 MG per tablet  Take 1 tablet by mouth.             metFORMIN (GLUCOPHAGE) 1000 MG tablet  Take 1,000 mg by mouth 2 (Two) Times a Day With Meals.             metoprolol succinate XL (TOPROL-XL) 100 MG 24 hr tablet  Take 100 mg by mouth daily.             MICROLET LANCETS misc  USE TO TEST BLOOD SUGAR EVERY DAY AND AS NEEDED             nitroglycerin (NITROSTAT) 0.4 MG SL tablet  Place 1 tablet under the tongue Every 5 (Five) Minutes As Needed for chest pain.             pantoprazole (PROTONIX) 40 MG EC tablet  Take 40 mg by mouth Daily.             pramipexole (MIRAPEX) 1 MG tablet  Take 1 mg by mouth Every Night. Taking 2mg daily             prasugrel (EFFIENT) 10 MG tablet  Take 10 mg by mouth Daily.             pravastatin (PRAVACHOL) 40 MG tablet  Take 80 mg by mouth Every Night.             ranolazine (RANEXA) 500 MG 12 hr tablet  1,000 mg 2 (Two) Times a Day.             traZODone (DESYREL) 300 MG tablet  Take 300 mg by mouth.                 Discharge Diet:  cardiac diet    Activity at Discharge:  I stimulated    Follow-up  Appointments  Future Appointments  Date Time Provider Department Center   4/11/2017 11:15 AM Robinson Gan MD [facility] GE [facility] None   6/13/2017 11:45 AM Edwige Briceno MD [facility] CD POW None         Test Results Pending at Discharge: None       Edwige Briceno MD  03/15/17  7:00 PM    Time: Discharge 35 min

## 2017-03-15 NOTE — PLAN OF CARE
Problem: Patient Care Overview (Adult)  Goal: Plan of Care Review  Outcome: Ongoing (interventions implemented as appropriate)    03/14/17 1566   Coping/Psychosocial Response Interventions   Plan Of Care Reviewed With patient   Patient Care Overview   Progress no change       Goal: Adult Individualization and Mutuality  Outcome: Ongoing (interventions implemented as appropriate)  Goal: Discharge Needs Assessment  Outcome: Ongoing (interventions implemented as appropriate)    Problem: Acute Coronary Syndrome (ACS) (Adult)  Goal: Signs and Symptoms of Listed Potential Problems Will be Absent or Manageable (Acute Coronary Syndrome)  Outcome: Ongoing (interventions implemented as appropriate)

## 2017-03-15 NOTE — CONSULTS
Patient Name: Yordy Hansen  Age/Sex: 36 y.o. male  : 1980  MRN: [id number]    Date of Hospital Visit: 3/13/2017  Encounter Provider: Edwige Briceno MD  Referring Provider: Asim Kumar MD         Subjective:       Chief Complaint: Unstable angina    History of Present Illness:  Yordy Hansen is a 36 y.o. male presented to my office for follow-up visit on  of this year.  Patient was in mild respiratory distress.  He was complaining of severe shortness of breath at rest along with retrosternal chest discomfort.  Patient had recurrent hospital admissions.  The patient is complaining of shortness of breath on minimal exertion.  He has orthopnea and PND.  Patient is also complaining of significant weight gain.        Past Medical History:  Past Medical History   Diagnosis Date   • Abdominal pain    • Acute right otitis media    • Allergic rhinitis    • Asthma    • Backache    • Bronchitis    • Cellulitis of lower leg      Right   • Chest pain    • Chronic back pain    • Chronic pain    • Cough    • Diarrhea    • Dyspnea    • GERD (gastroesophageal reflux disease)    • Headache    • Hip pain    • Hypertension    • Insomnia    • Low back pain    • Lumbosacral strain    • Morbid obesity    • RLS (restless legs syndrome)    • Seizures        Past Surgical History   Procedure Laterality Date   • Transesophageal echocardiogram ([facility])       With color flow   • Cardiac catheterization       LAD stent       Home Medications:    Prescriptions Prior to Admission   Medication Sig Dispense Refill Last Dose   • albuterol (PROVENTIL HFA;VENTOLIN HFA) 108 (90 BASE) MCG/ACT inhaler Inhale 2 puffs as needed for wheezing.   Past Week at Unknown time   • amLODIPine (NORVASC) 10 MG tablet Take 10 mg by mouth daily.   3/12/2017 at 0900   • aspirin 81 MG tablet Take 81 mg by mouth Daily.   3/12/2017 at 0900   • FENOFIBRATE MICRONIZED PO Take 1 tablet by mouth every night.   3/12/2017 at 0900   • isosorbide  mononitrate (IMDUR) 60 MG 24 hr tablet Take 120 mg by mouth Daily.   3/12/2017 at 0900   • lisinopril (PRINIVIL,ZESTRIL) 20 MG tablet Take 20 mg by mouth daily.   3/12/2017 at 0900   • lisinopril-hydrochlorothiazide (PRINZIDE,ZESTORETIC) 20-25 MG per tablet Take 1 tablet by mouth.   3/12/2017 at 0900   • metoprolol succinate XL (TOPROL-XL) 100 MG 24 hr tablet Take 100 mg by mouth daily.   3/12/2017 at 0900   • pantoprazole (PROTONIX) 40 MG EC tablet Take 40 mg by mouth Daily.   3/12/2017 at 0900   • pramipexole (MIRAPEX) 1 MG tablet Take 1 mg by mouth Every Night. Taking 2mg daily   3/12/2017 at 2100   • prasugrel (EFFIENT) 10 MG tablet Take 10 mg by mouth Daily.   3/12/2017 at 0900   • pravastatin (PRAVACHOL) 40 MG tablet Take 80 mg by mouth Every Night.   3/12/2017 at 2100   • ranolazine (RANEXA) 500 MG 12 hr tablet 1,000 mg 2 (Two) Times a Day.   3/12/2017 at 0900   • traZODone (DESYREL) 300 MG tablet Take 300 mg by mouth.   3/12/2017 at 2100   • Canagliflozin (INVOKANA) 100 MG tablet Take 1 tablet by mouth daily.   3/12/2017 at 0900   • ipratropium-albuterol (DUO-NEB) 0.5-2.5 mg/mL nebulizer Inhale 3 mL Every 4 (Four) Hours As Needed.   Taking   • Liraglutide (VICTOZA) 18 MG/3ML solution pen-injector Inject  under the skin.   Taking   • metFORMIN (GLUCOPHAGE) 1000 MG tablet Take 1,000 mg by mouth 2 (Two) Times a Day With Meals.   3/12/2017 at 0900   • MICROLET LANCETS misc USE TO TEST BLOOD SUGAR EVERY DAY AND AS NEEDED   Taking   • nitroglycerin (NITROSTAT) 0.4 MG SL tablet Place 1 tablet under the tongue Every 5 (Five) Minutes As Needed for chest pain. 25 tablet 6 Taking       Allergies:  Allergies   Allergen Reactions   • Glipizide    • Phenergan [Promethazine Hcl]    • Risperdal [Risperidone]    • Tramadol        Past Social History:  Social History     Social History   • Marital status:      Spouse name: N/A   • Number of children: N/A   • Years of education: N/A     Social History Main Topics   •  Smoking status: Former Smoker   • Smokeless tobacco: Not on file   • Alcohol use No   • Drug use: No   • Sexual activity: Not on file     Other Topics Concern   • Not on file     Social History Narrative   • No narrative on file       Past Family History:      Review of Systems:   Constitution: No chills, no rigors, no unexplained weight loss or weight gain  Eyes:  No diplopia, no blurred vision, no loss of vision, conjunctiva is pink and sclera is anicteric  ENT:  No tinnitus, no otorrhea, no epistaxis, no sore throat   Respiratory: No cough, no hemoptysis  Cardiovascular: Chest pain, orthopnea, PND  Gastrointestinal: No nausea, no vomiting, no hematemesis, no diarrhea or constipation, no melena  Genitourinary: No frequency of dysuria no hematuria  Integument: positive for  No pruritis and  no skin rash  Hematologic / Lymphatic: No excessive bleeding, easy bruising, fatigue, lymphadenopathy and petechiae  Musculoskeletal: No joint pain, joint stiffness, joint swelling, muscle pain, muscle weakness and neck pain  Neurological: No dizziness, headaches, light headedness, seizures and vertigo  Behavioral/Psych: No depression, homicidal ideations and suicidal ideations  Endocrine: No frequent urination and nocturia, temperature intolerance, weight gain, unintended and weight loss, unintended      Objective:     Objective:  Vital Signs (last 24 hours)       03/13 0700  -  03/14 0659 03/14 0700  -  03/14 2131   Most Recent    Temp (°F) 97.2 -  99    96.4 -  97.6     97.4 (36.3)    Heart Rate 66 -  88    67 -  75     74    Resp 18 -  26      18     18    /59 -  (!) 217/95    116/62 -  151/90     149/70    SpO2 (%) 95 -  99    93 -  96     95          Body mass index is 65.27 kg/(m^2).  Weight change:           Physical Exam:   General Appearance:    Alert, oriented, cooperative, in no acute distress   Head:    Normocephalic, atraumatic, without obvious abnormality   Eyes:            Lids and lashes normal,  conjunctivae and sclerae normal, no   icterus, no pallor   Ears:    Ears appear intact with no abnormalities noted   Throat:   Mucous membranes pink and moist   Neck:   Supple, trachea midline, no carotid bruit, no JVD   Lungs:     Clear to auscultation, respirations regular, even and                   Unlabored. No wheezes, rales, rhonchi    Heart:    Regular rhythm and normal rate, normal S1 and S2, no            murmur, no gallop, no rub, no click   Abdomen:     Normal bowel sounds, no masses, liver and spleen nonpalpable, soft, non-tender, non-distended, no guarding, no rebound tenderness   Genitalia:    Deferred   Extremities:   Moves all extremities well, no edema, no cyanosis, no              Redness, no clubbing   Pulses:   Pulses palpable and equal bilaterally   Skin:   No bleeding, bruising or rash   Neurologic:   Alert and oriented to person, place, and time. No focal neurological deficits       Lab Review:       Results from last 7 days  Lab Units 03/13/17  1550   SODIUM mmol/L 136*   POTASSIUM mmol/L 3.9   CHLORIDE mmol/L 95   TOTAL CO2 mmol/L 30.0   BUN mg/dL 9   CREATININE mg/dL 0.62*   CALCIUM mg/dL 8.9   BILIRUBIN mg/dL 0.4   ALK PHOS U/L 79   ALT (SGPT) U/L 37   AST (SGOT) U/L 38   GLUCOSE mg/dL 303*       Results from last 7 days  Lab Units 03/14/17  0751 03/14/17  0052 03/13/17 1926 03/13/17  1550   CK TOTAL U/L  --   --   --  324*   TROPONIN I ng/mL <0.012 <0.012 <0.012 <0.012           Results from last 7 days  Lab Units 03/14/17  1339   WBC 10*3/mm3 8.64   HEMOGLOBIN g/dL 12.7*   HEMATOCRIT % 38.2*   PLATELETS 10*3/mm3 208       Results from last 7 days  Lab Units 03/14/17  1753 03/14/17  1058 03/14/17  0751  03/13/17  1926 03/13/17  1550   INR   --   --   --   --  1.04 0.92   APTT seconds 30.9 31.7 36.0  < > 30.2  --    < > = values in this interval not displayed.        Results from last 7 days  Lab Units 03/08/17  1620   CHOLESTEROL mg/dL 176           Invalid input(s):  T4,   FREET4    EKG:   ECG/EMG Results (last 24 hours)     Procedure Component Value Units Date/Time    ECG 12 Lead [[id number]] Collected:  03/14/17 0602     Updated:  03/14/17 0723    Narrative:       Test Reason : anginaBlood Pressure : **/** mmHGVent. Rate : 072 BPM     Atrial Rate : 072 BPM   P-R Int : 156 ms          QRS Dur : 100 ms    QT Int : 388 ms       P-R-T Axes : 055 065 081 degrees   QTc Int : 424 msNormal sinus rhythmNormal ECGWhen   compared with ECG of 13-MAR-2017 19:44,No significant change was foundReferred By:             Confirmed By:     SCANNED EKG [[id number]] Resulted:  03/13/17      Updated:  03/14/17 0926    SCANNED EKG [[id number]] Resulted:  03/13/17      Updated:  03/14/17 1115    SCANNED EKG [[id number]] Resulted:  03/13/17      Updated:  03/14/17 1242    SCANNED EKG [[id number]] Resulted:  03/13/17      Updated:  03/14/17 1306    SCANNED EKG [[id number]] Resulted:  03/13/17      Updated:  03/14/17 1306    ECG 12 Lead [[id number]] Collected:  03/13/17 1502     Updated:  03/14/17 2109    Narrative:       Test Reason : SOABlood Pressure : **/** mmHGVent. Rate : 077 BPM     Atrial Rate : 077 BPM   P-R Int : 142 ms          QRS Dur : 096 ms    QT Int : 364 ms       P-R-T Axes : 072 072 064 degrees   QTc Int : 411 msNormal sinus rhythmNormal ECGConfirmed by   TEODORO VIDAL, B. N. ([id number]),  JACKLYN ANNA ([id number]) on3/14/2017 9:08:56 PMReferred By:  KORTNEY           Confirmed By:LIZET VALERIO MD    ECG 12 Lead [[id number]] Collected:  03/13/17 1944     Updated:  03/14/17 2118    Narrative:       Test Reason : anginaBlood Pressure : **/** mmHGVent. Rate : 081 BPM     Atrial Rate : 081 BPM   P-R Int : 152 ms          QRS Dur : 110 ms    QT Int : 380 ms       P-R-T Axes : 045 056 070 degrees   QTc Int : 441 msNormal sinus rhythmNormal ECGWhen   compared with ECG of 13-MAR-2017 15:02,No significant change was foundConfirmed by TEODORO VIDAL, B. N. ([id number]),  JACKLYN ANNA ([id number]) on3/14/2017 9:18:24 PMReferred By:              Confirmed By:LIZET VALERIO MD          Imaging:  Imaging Results (last 24 hours)     ** No results found for the last 24 hours. **          I personally viewed and interpreted the patient's EKG/Telemetry data.    Assessment:     Principal Problem:  1. Unstable angina  2. Morbid obesity with body mass index (BMI) of 60.0 to 69.9 in adult  3. Essential hypertension  4. Hyperlipidemia  5. Coronary artery disease involving native coronary artery of native heart without angina pectoris        Plan:     · nothing by mouth after midnight perform diagnostic coronary angiogram in the morning.  · We will continue with unfractionated heparin.    · I have discussed indication alternatives and all potential complications of diagnostic coronary angiogram with the patient.  He has verbalized understanding and has agreed to undergo cardiac catheterization.  All his questions have been answered.      Edwige Briceno MD  03/14/17  9:31 PM     EMR Dragon/Transcription disclaimer:   Much of this encounter note is an electronic transcription/translation of spoken language to printed text. The electronic translation of spoken language may permit erroneous, or at times, nonsensical words or phrases to be inadvertently transcribed; Although I have reviewed the note for such errors, some may still exist.

## 2017-03-15 NOTE — PLAN OF CARE
Problem: Patient Care Overview (Adult)  Goal: Plan of Care Review  Outcome: Ongoing (interventions implemented as appropriate)    03/15/17 3656   Coping/Psychosocial Response Interventions   Plan Of Care Reviewed With patient   Patient Care Overview   Progress no change   Outcome Evaluation   Outcome Summary/Follow up Plan heart cath done today. patient educated to wear immobilizer on right arm , patient refused. educated him the importance of keeping right arm straight and not moving wrist.        Goal: Adult Individualization and Mutuality  Outcome: Ongoing (interventions implemented as appropriate)  Goal: Discharge Needs Assessment  Outcome: Ongoing (interventions implemented as appropriate)    Problem: Acute Coronary Syndrome (ACS) (Adult)  Goal: Signs and Symptoms of Listed Potential Problems Will be Absent or Manageable (Acute Coronary Syndrome)  Outcome: Ongoing (interventions implemented as appropriate)

## 2017-03-16 NOTE — PROGRESS NOTES
[facility] Medicine Services  INPATIENT PROGRESS NOTE    Length of Stay: 0  Date of Admission: 3/13/2017  Primary Care Physician: LAAL Read    Subjective   Chief Complaint: Chest pain and SOA  HPI: 36 year old admitted for unstable angina.  Cardiac enzymes times 3 were negative, EKG did not show acute ischemic changes.  Cardiologist consulted. Patient under went PTCA and stenting.  Patient over all feeling better.    Review of Systems     All pertinent negatives and positives are as above. All other systems have been reviewed and are negative unless otherwise stated.     Objective    Temp:  [97.2 °F (36.2 °C)-98.4 °F (36.9 °C)] 97.6 °F (36.4 °C)  Heart Rate:  [70-79] 79  Resp:  [18-22] 22  BP: (124-158)/(59-79) 128/59  Physical Exam   Constitutional: He is oriented to person, place, and time. He appears well-developed and well-nourished.   HENT:   Head: Normocephalic and atraumatic.   Eyes: EOM are normal. Pupils are equal, round, and reactive to light.   Cardiovascular: Normal rate, regular rhythm and intact distal pulses.    Pulmonary/Chest: Effort normal and breath sounds normal.   Decreased air movement bilaterally no wheeze heard   Abdominal: Soft. Bowel sounds are normal.   Musculoskeletal: Normal range of motion. He exhibits edema.   Neurological: He is alert and oriented to person, place, and time. He has normal reflexes.   Skin: Skin is warm.   Psychiatric: He has a normal mood and affect. His behavior is normal. Judgment and thought content normal.   Vitals reviewed.    Results Review:  I have reviewed the labs, radiology results, and diagnostic studies.    Laboratory Data:   Lab Results (last 24 hours)     Procedure Component Value Units Date/Time    aPTT [[id number]]  (Normal) Collected:  03/15/17 0058    Specimen:  Blood Updated:  03/15/17 0147     PTT 30.8 seconds     Narrative:       The recommended Heparin therapeutic range is 68-97 seconds.    POC  Glucose Fingerstick [[id number]]  (Abnormal) Collected:  03/14/17 1923    Specimen:  Blood Updated:  03/15/17 0514     Glucose 264 (H) mg/dL       RN NotifiedMeter: [id number]: [id number] BELA LAWSON       CBC & Differential [[id number]] Collected:  03/15/17 0527    Specimen:  Blood Updated:  03/15/17 0604    Narrative:       The following orders were created for panel order CBC & Differential.  Procedure                               Abnormality         Status                     ---------                               -----------         ------                     CBC Auto Differential[[id number]]         Abnormal            Final result                 Please view results for these tests on the individual orders.    CBC Auto Differential [[id number]]  (Abnormal) Collected:  03/15/17 0527    Specimen:  Blood Updated:  03/15/17 0604     WBC 9.64 10*3/mm3      RBC 4.93 10*6/mm3      Hemoglobin 13.7 g/dL      Hematocrit 40.0 %      MCV 81.1 fL      MCH 27.8 pg      MCHC 34.3 g/dL      RDW 14.9 (H) %      RDW-SD 43.9 fl      MPV 9.3 fL      Platelets 190 10*3/mm3      Neutrophil % 63.6 %      Lymphocyte % 22.1 %      Monocyte % 9.2 %      Eosinophil % 3.0 %      Basophil % 0.2 %      Immature Grans % 1.9 (H) %      Neutrophils, Absolute 6.13 10*3/mm3      Lymphocytes, Absolute 2.13 10*3/mm3      Monocytes, Absolute 0.89 10*3/mm3      Eosinophils, Absolute 0.29 10*3/mm3      Basophils, Absolute 0.02 10*3/mm3      Immature Grans, Absolute 0.18 (H) 10*3/mm3     Magnesium [[id number]]  (Normal) Collected:  03/15/17 0527    Specimen:  Blood Updated:  03/15/17 0615     Magnesium 2.1 mg/dL     Comprehensive Metabolic Panel [[id number]]  (Abnormal) Collected:  03/15/17 0527    Specimen:  Blood Updated:  03/15/17 0624     Glucose 173 (H) mg/dL      BUN 14 mg/dL      Creatinine 0.78 mg/dL      Sodium 137 mmol/L      Potassium 4.3 mmol/L      Chloride 95 mmol/L      CO2 29.0 mmol/L      Calcium 8.8 mg/dL      Total Protein 7.7 g/dL       Albumin 3.90 g/dL      ALT (SGPT) 32 U/L      AST (SGOT) 32 U/L      Alkaline Phosphatase 87 U/L      Total Bilirubin 0.9 mg/dL      eGFR Non African Amer 111 mL/min/1.73      Globulin 3.8 (H) gm/dL      A/G Ratio 1.0 (L) g/dL      BUN/Creatinine Ratio 17.9      Anion Gap 13.0 mmol/L     Lipid Panel [[id number]]  (Abnormal) Collected:  03/15/17 0527    Specimen:  Blood Updated:  03/15/17 0625     Total Cholesterol 187 mg/dL      Triglycerides 280 (H) mg/dL      HDL Cholesterol 34 (L) mg/dL      LDL Cholesterol  116 mg/dL      LDL/HDL Ratio 2.85     POC Glucose Fingerstick [[id number]]  (Abnormal) Collected:  03/15/17 0613    Specimen:  Blood Updated:  03/15/17 0644     Glucose 197 (H) mg/dL       RN NotifiedMeter: [id number]: [id number] JUWAN THOMAS       TSH [[id number]]  (Normal) Collected:  03/15/17 0527    Specimen:  Blood Updated:  03/15/17 0645     TSH 2.840 mIU/mL     aPTT [[id number]]  (Normal) Collected:  03/15/17 0736    Specimen:  Blood Updated:  03/15/17 0819     PTT 32.0 seconds     Narrative:       The recommended Heparin therapeutic range is 68-97 seconds.    Protime-INR [[id number]]  (Normal) Collected:  03/15/17 0736    Specimen:  Blood Updated:  03/15/17 0905     Protime 12.8 Seconds      INR 0.96     Narrative:       Therapeutic range for most indications is 2.0-3.0 INR,  or 2.5-3.5 for mechanical heart valves.    POC Glucose Fingerstick [[id number]]  (Abnormal) Collected:  03/15/17 1320    Specimen:  Blood Updated:  03/15/17 1333     Glucose 211 (H) mg/dL       RN NotifiedMeter: [id number]: [id number] ELBA PETERSEN       POC Glucose Fingerstick [[id number]]  (Abnormal) Collected:  03/15/17 1542    Specimen:  Blood Updated:  03/15/17 1608     Glucose 220 (H) mg/dL       RN NotifiedMeter: [id number]: [id number] ELBA PETERSEN             Culture Data:   No results found for: BLOODCX  No results found for: URINECX  No results found for: RESPCX  No results found for:  WOUNDCX  No results found for: STOOLCX  No components found for: BODYFLD    Radiology Data:   Imaging Results (last 24 hours)     Procedure Component Value Units Date/Time    XR Chest 1 View [[id number]] Collected:  03/13/17 1544     Updated:  03/13/17 1546    Narrative:       PROCEDURE: Chest, portable AP upright at 3:12 PM.    INDICATION: Chest pain.    COMPARISON: 3/8/2017 and earlier exams.    Heart size normal with normal vascularity. Lungs clear. No  pleural effusion.    Bony structures unremarkable.      Impression:       No acute disease.    [id number]    Electronically signed by:  Julito aCrbone MD  3/13/2017 3:45  PM CDT Workstation: [employer]          I have reviewed the patient current medications.     Scheduled Meds:    amLODIPine 10 mg Oral Daily   aspirin 81 mg Oral Daily   furosemide 40 mg Intravenous BID   insulin aspart 0-14 Units Subcutaneous 4x Daily AC & at Bedtime   insulin detemir 20 Units Subcutaneous Nightly   isosorbide mononitrate 120 mg Oral Daily   lisinopril 20 mg Oral Daily   metoprolol succinate  mg Oral Daily   radial cocktail nitroglycerin 200 mcg 1 mL Intra-arterial Once   pantoprazole 40 mg Oral Daily   pramipexole 1 mg Oral Nightly   prasugrel 10 mg Oral Daily   pravastatin 80 mg Oral Nightly   ranolazine 1,000 mg Oral BID   traZODone 300 mg Oral Nightly   radial cocktail  Intra-arterial Once     Continuous Infusions:    sodium chloride 3 mL/kg/hr Last Rate: Stopped (03/15/17 1536)   sodium chloride 100 mL/hr Last Rate: Stopped (03/15/17 1803)     PRN Meds:.•  acetaminophen  •  dextrose  •  dextrose  •  glucagon (human recombinant)  •  ipratropium-albuterol  •  Morphine **AND** naloxone  •  ondansetron **OR** ondansetron ODT **OR** ondansetron  •  sodium chloride  •  sodium chloride      Assessment/Plan     Hospital Problem List     * (Principal)Unstable angina    Morbid obesity with body mass index (BMI) of 60.0 to 69.9 in adult    Essential hypertension    Hyperlipidemia     Coronary artery disease involving native coronary artery of native heart without angina pectoris    Precordial pain    Chest pain          Plan:  PTCA and stenting today.   Overall feels better.  Echo 1/16/17   CONCLUSION:  1. Limited transthoracic echocardiogram.  2. Normal left ventricular function with an estimated ejection  fraction of 65% without regional wall motion abnormalities.  3. There appears to be at least mildly dilated right ventricle with  normal function.  4. No significant valvular regurgitation or stenosis given the  limitations of the study.    On aspirin and effient.    Patient will be discharged home today.    Discharge Planning: I expect patient to be discharged to home in 1-2 days.    Anders Hogue MD   03/15/17   7:02 PM

## 2017-03-17 ENCOUNTER — APPOINTMENT (OUTPATIENT)
Dept: GENERAL RADIOLOGY | Facility: HOSPITAL | Age: 37
End: 2017-03-17

## 2017-03-17 ENCOUNTER — HOSPITAL ENCOUNTER (OUTPATIENT)
Facility: HOSPITAL | Age: 37
Setting detail: OBSERVATION
Discharge: HOME | End: 2017-03-17
Attending: EMERGENCY MEDICINE | Admitting: INTERNAL MEDICINE

## 2017-03-17 VITALS
HEART RATE: 84 BPM | SYSTOLIC BLOOD PRESSURE: 150 MMHG | TEMPERATURE: 98.1 F | OXYGEN SATURATION: 96 % | WEIGHT: 315 LBS | BODY MASS INDEX: 44.1 KG/M2 | DIASTOLIC BLOOD PRESSURE: 68 MMHG | HEIGHT: 71 IN | RESPIRATION RATE: 20 BRPM

## 2017-03-17 DIAGNOSIS — R07.9 CHEST PAIN, UNSPECIFIED TYPE: Primary | ICD-10-CM

## 2017-03-17 LAB
ALBUMIN SERPL-MCNC: 4 G/DL (ref 3.4–4.8)
ALBUMIN/GLOB SERPL: 1.1 G/DL (ref 1.1–1.8)
ALP SERPL-CCNC: 83 U/L (ref 38–126)
ALT SERPL W P-5'-P-CCNC: 31 U/L (ref 21–72)
ANION GAP SERPL CALCULATED.3IONS-SCNC: 11 MMOL/L (ref 5–15)
AST SERPL-CCNC: 40 U/L (ref 17–59)
BASOPHILS # BLD AUTO: 0.03 10*3/MM3 (ref 0–0.2)
BASOPHILS NFR BLD AUTO: 0.3 % (ref 0–2)
BILIRUB SERPL-MCNC: 0.6 MG/DL (ref 0.2–1.3)
BUN BLD-MCNC: 11 MG/DL (ref 7–21)
BUN/CREAT SERPL: 16.4 (ref 7–25)
CALCIUM SPEC-SCNC: 8.5 MG/DL (ref 8.4–10.2)
CHLORIDE SERPL-SCNC: 97 MMOL/L (ref 95–110)
CK MB SERPL-CCNC: 3.29 NG/ML (ref 0–5)
CK MB SERPL-CCNC: 3.35 NG/ML (ref 0–5)
CK SERPL-CCNC: 382 U/L (ref 55–170)
CK SERPL-CCNC: 395 U/L (ref 55–170)
CO2 SERPL-SCNC: 28 MMOL/L (ref 22–31)
CREAT BLD-MCNC: 0.67 MG/DL (ref 0.7–1.3)
DEPRECATED RDW RBC AUTO: 44 FL (ref 35.1–43.9)
EOSINOPHIL # BLD AUTO: 0.24 10*3/MM3 (ref 0–0.7)
EOSINOPHIL NFR BLD AUTO: 2.7 % (ref 0–7)
ERYTHROCYTE [DISTWIDTH] IN BLOOD BY AUTOMATED COUNT: 15 % (ref 11.5–14.5)
GFR SERPL CREATININE-BSD FRML MDRD: 133 ML/MIN/1.73 (ref 70–162)
GLOBULIN UR ELPH-MCNC: 3.5 GM/DL (ref 2.3–3.5)
GLUCOSE BLD-MCNC: 216 MG/DL (ref 60–100)
GLUCOSE BLDC GLUCOMTR-MCNC: 165 MG/DL (ref 70–130)
HCT VFR BLD AUTO: 40.1 % (ref 39–49)
HGB BLD-MCNC: 13.4 G/DL (ref 13.7–17.3)
HOLD SPECIMEN: NORMAL
HOLD SPECIMEN: NORMAL
IMM GRANULOCYTES # BLD: 0.18 10*3/MM3 (ref 0–0.02)
IMM GRANULOCYTES NFR BLD: 2 % (ref 0–0.5)
INR PPP: 0.96 (ref 0.8–1.2)
LYMPHOCYTES # BLD AUTO: 1.73 10*3/MM3 (ref 0.6–4.2)
LYMPHOCYTES NFR BLD AUTO: 19.4 % (ref 10–50)
MCH RBC QN AUTO: 27.1 PG (ref 26.5–34)
MCHC RBC AUTO-ENTMCNC: 33.4 G/DL (ref 31.5–36.3)
MCV RBC AUTO: 81 FL (ref 80–98)
MONOCYTES # BLD AUTO: 0.77 10*3/MM3 (ref 0–0.9)
MONOCYTES NFR BLD AUTO: 8.6 % (ref 0–12)
NEUTROPHILS # BLD AUTO: 5.99 10*3/MM3 (ref 2–8.6)
NEUTROPHILS NFR BLD AUTO: 67 % (ref 37–80)
NRBC BLD MANUAL-RTO: 0 /100 WBC (ref 0–0)
NT-PROBNP SERPL-MCNC: 35.5 PG/ML (ref 0–450)
PLATELET # BLD AUTO: 208 10*3/MM3 (ref 150–450)
PMV BLD AUTO: 8.5 FL (ref 8–12)
POTASSIUM BLD-SCNC: 4.2 MMOL/L (ref 3.5–5.1)
PROT SERPL-MCNC: 7.5 G/DL (ref 6.3–8.6)
PROTHROMBIN TIME: 12.8 SECONDS (ref 11.1–15.3)
RBC # BLD AUTO: 4.95 10*6/MM3 (ref 4.37–5.74)
SODIUM BLD-SCNC: 136 MMOL/L (ref 137–145)
TROPONIN I SERPL-MCNC: <0.012 NG/ML
WBC NRBC COR # BLD: 8.94 10*3/MM3 (ref 3.2–9.8)
WHOLE BLOOD HOLD SPECIMEN: NORMAL
WHOLE BLOOD HOLD SPECIMEN: NORMAL

## 2017-03-17 PROCEDURE — 99285 EMERGENCY DEPT VISIT HI MDM: CPT

## 2017-03-17 PROCEDURE — 80053 COMPREHEN METABOLIC PANEL: CPT | Performed by: EMERGENCY MEDICINE

## 2017-03-17 PROCEDURE — 82553 CREATINE MB FRACTION: CPT | Performed by: EMERGENCY MEDICINE

## 2017-03-17 PROCEDURE — G0378 HOSPITAL OBSERVATION PER HR: HCPCS

## 2017-03-17 PROCEDURE — 25010000002 MORPHINE PER 10 MG: Performed by: EMERGENCY MEDICINE

## 2017-03-17 PROCEDURE — 25010000002 ONDANSETRON PER 1 MG: Performed by: EMERGENCY MEDICINE

## 2017-03-17 PROCEDURE — 93005 ELECTROCARDIOGRAM TRACING: CPT

## 2017-03-17 PROCEDURE — 82962 GLUCOSE BLOOD TEST: CPT

## 2017-03-17 PROCEDURE — 82553 CREATINE MB FRACTION: CPT | Performed by: PHYSICIAN ASSISTANT

## 2017-03-17 PROCEDURE — 63710000001 INSULIN ASPART PER 5 UNITS: Performed by: PHYSICIAN ASSISTANT

## 2017-03-17 PROCEDURE — 83880 ASSAY OF NATRIURETIC PEPTIDE: CPT | Performed by: EMERGENCY MEDICINE

## 2017-03-17 PROCEDURE — 82550 ASSAY OF CK (CPK): CPT | Performed by: PHYSICIAN ASSISTANT

## 2017-03-17 PROCEDURE — 85610 PROTHROMBIN TIME: CPT | Performed by: EMERGENCY MEDICINE

## 2017-03-17 PROCEDURE — 84484 ASSAY OF TROPONIN QUANT: CPT | Performed by: EMERGENCY MEDICINE

## 2017-03-17 PROCEDURE — 84484 ASSAY OF TROPONIN QUANT: CPT | Performed by: PHYSICIAN ASSISTANT

## 2017-03-17 PROCEDURE — 82550 ASSAY OF CK (CPK): CPT | Performed by: EMERGENCY MEDICINE

## 2017-03-17 PROCEDURE — 85025 COMPLETE CBC W/AUTO DIFF WBC: CPT | Performed by: EMERGENCY MEDICINE

## 2017-03-17 PROCEDURE — 71010 HC CHEST PA OR AP: CPT

## 2017-03-17 PROCEDURE — 93010 ELECTROCARDIOGRAM REPORT: CPT | Performed by: INTERNAL MEDICINE

## 2017-03-17 RX ORDER — NITROGLYCERIN 0.4 MG/1
0.4 TABLET SUBLINGUAL
Status: DISCONTINUED | OUTPATIENT
Start: 2017-03-17 | End: 2017-03-17 | Stop reason: HOSPADM

## 2017-03-17 RX ORDER — RANOLAZINE 1000 MG/1
1000 TABLET, EXTENDED RELEASE ORAL EVERY 12 HOURS SCHEDULED
Qty: 60 TABLET | Refills: 1 | Status: SHIPPED | OUTPATIENT
Start: 2017-03-17 | End: 2017-06-01 | Stop reason: SDUPTHER

## 2017-03-17 RX ORDER — ISOSORBIDE MONONITRATE 120 MG/1
120 TABLET, EXTENDED RELEASE ORAL DAILY
Qty: 30 TABLET | Refills: 1 | Status: SHIPPED | OUTPATIENT
Start: 2017-03-17 | End: 2017-03-29 | Stop reason: HOSPADM

## 2017-03-17 RX ORDER — RANOLAZINE 500 MG/1
1000 TABLET, EXTENDED RELEASE ORAL EVERY 12 HOURS SCHEDULED
Status: DISCONTINUED | OUTPATIENT
Start: 2017-03-17 | End: 2017-03-17 | Stop reason: HOSPADM

## 2017-03-17 RX ORDER — RANOLAZINE 500 MG/1
1000 TABLET, EXTENDED RELEASE ORAL 2 TIMES DAILY
Qty: 60 TABLET | Refills: 1 | Status: SHIPPED | OUTPATIENT
Start: 2017-03-17 | End: 2017-03-29 | Stop reason: HOSPADM

## 2017-03-17 RX ORDER — PRAMIPEXOLE DIHYDROCHLORIDE 1 MG/1
1 TABLET ORAL NIGHTLY
Status: DISCONTINUED | OUTPATIENT
Start: 2017-03-17 | End: 2017-03-17 | Stop reason: HOSPADM

## 2017-03-17 RX ORDER — ISOSORBIDE MONONITRATE 60 MG/1
120 TABLET, EXTENDED RELEASE ORAL DAILY
Status: DISCONTINUED | OUTPATIENT
Start: 2017-03-17 | End: 2017-03-17 | Stop reason: HOSPADM

## 2017-03-17 RX ORDER — ONDANSETRON 2 MG/ML
4 INJECTION INTRAMUSCULAR; INTRAVENOUS ONCE
Status: COMPLETED | OUTPATIENT
Start: 2017-03-17 | End: 2017-03-17

## 2017-03-17 RX ORDER — PRASUGREL 10 MG/1
10 TABLET, FILM COATED ORAL DAILY
Qty: 30 TABLET | Refills: 1 | Status: SHIPPED | OUTPATIENT
Start: 2017-03-17 | End: 2017-04-25 | Stop reason: HOSPADM

## 2017-03-17 RX ORDER — METOPROLOL SUCCINATE 100 MG/1
100 TABLET, EXTENDED RELEASE ORAL DAILY
Qty: 30 TABLET | Refills: 1 | Status: SHIPPED | OUTPATIENT
Start: 2017-03-17 | End: 2017-06-01 | Stop reason: SDUPTHER

## 2017-03-17 RX ORDER — TRAZODONE HYDROCHLORIDE 150 MG/1
300 TABLET ORAL NIGHTLY
Status: DISCONTINUED | OUTPATIENT
Start: 2017-03-17 | End: 2017-03-17 | Stop reason: HOSPADM

## 2017-03-17 RX ORDER — METOPROLOL SUCCINATE 100 MG/1
100 TABLET, EXTENDED RELEASE ORAL DAILY
Qty: 30 TABLET | Refills: 1 | Status: SHIPPED | OUTPATIENT
Start: 2017-03-17 | End: 2017-03-29 | Stop reason: HOSPADM

## 2017-03-17 RX ORDER — AMLODIPINE BESYLATE 10 MG/1
10 TABLET ORAL DAILY
Status: DISCONTINUED | OUTPATIENT
Start: 2017-03-17 | End: 2017-03-17 | Stop reason: HOSPADM

## 2017-03-17 RX ORDER — METOPROLOL SUCCINATE 100 MG/1
100 TABLET, EXTENDED RELEASE ORAL DAILY
Status: DISCONTINUED | OUTPATIENT
Start: 2017-03-17 | End: 2017-03-17 | Stop reason: HOSPADM

## 2017-03-17 RX ORDER — PANTOPRAZOLE SODIUM 40 MG/1
40 TABLET, DELAYED RELEASE ORAL DAILY
Status: DISCONTINUED | OUTPATIENT
Start: 2017-03-17 | End: 2017-03-17 | Stop reason: HOSPADM

## 2017-03-17 RX ORDER — IPRATROPIUM BROMIDE AND ALBUTEROL SULFATE 2.5; .5 MG/3ML; MG/3ML
3 SOLUTION RESPIRATORY (INHALATION)
Status: DISCONTINUED | OUTPATIENT
Start: 2017-03-17 | End: 2017-03-17 | Stop reason: HOSPADM

## 2017-03-17 RX ORDER — DEXTROSE MONOHYDRATE 25 G/50ML
25 INJECTION, SOLUTION INTRAVENOUS
Status: DISCONTINUED | OUTPATIENT
Start: 2017-03-17 | End: 2017-03-17 | Stop reason: HOSPADM

## 2017-03-17 RX ORDER — PRAVASTATIN SODIUM 40 MG
80 TABLET ORAL NIGHTLY
Status: DISCONTINUED | OUTPATIENT
Start: 2017-03-17 | End: 2017-03-17 | Stop reason: HOSPADM

## 2017-03-17 RX ORDER — NICOTINE POLACRILEX 4 MG
15 LOZENGE BUCCAL
Status: DISCONTINUED | OUTPATIENT
Start: 2017-03-17 | End: 2017-03-17 | Stop reason: HOSPADM

## 2017-03-17 RX ORDER — MORPHINE SULFATE 4 MG/ML
4 INJECTION, SOLUTION INTRAMUSCULAR; INTRAVENOUS ONCE
Status: COMPLETED | OUTPATIENT
Start: 2017-03-17 | End: 2017-03-17

## 2017-03-17 RX ORDER — ASPIRIN 325 MG
325 TABLET ORAL ONCE
Status: COMPLETED | OUTPATIENT
Start: 2017-03-17 | End: 2017-03-17

## 2017-03-17 RX ORDER — HYDRALAZINE HYDROCHLORIDE 20 MG/ML
10 INJECTION INTRAMUSCULAR; INTRAVENOUS EVERY 6 HOURS PRN
Status: DISCONTINUED | OUTPATIENT
Start: 2017-03-17 | End: 2017-03-17 | Stop reason: HOSPADM

## 2017-03-17 RX ORDER — SODIUM CHLORIDE 0.9 % (FLUSH) 0.9 %
10 SYRINGE (ML) INJECTION AS NEEDED
Status: DISCONTINUED | OUTPATIENT
Start: 2017-03-17 | End: 2017-03-17 | Stop reason: HOSPADM

## 2017-03-17 RX ORDER — PRASUGREL 10 MG/1
10 TABLET, FILM COATED ORAL DAILY
Status: DISCONTINUED | OUTPATIENT
Start: 2017-03-17 | End: 2017-03-17 | Stop reason: HOSPADM

## 2017-03-17 RX ORDER — LISINOPRIL 20 MG/1
20 TABLET ORAL DAILY
Status: DISCONTINUED | OUTPATIENT
Start: 2017-03-17 | End: 2017-03-17 | Stop reason: HOSPADM

## 2017-03-17 RX ORDER — ASPIRIN 81 MG/1
81 TABLET, CHEWABLE ORAL DAILY
Status: DISCONTINUED | OUTPATIENT
Start: 2017-03-18 | End: 2017-03-17 | Stop reason: HOSPADM

## 2017-03-17 RX ORDER — ONDANSETRON 2 MG/ML
4 INJECTION INTRAMUSCULAR; INTRAVENOUS EVERY 6 HOURS PRN
Status: DISCONTINUED | OUTPATIENT
Start: 2017-03-17 | End: 2017-03-17 | Stop reason: HOSPADM

## 2017-03-17 RX ADMIN — MORPHINE SULFATE 4 MG: 4 INJECTION, SOLUTION INTRAMUSCULAR; INTRAVENOUS at 13:00

## 2017-03-17 RX ADMIN — PRASUGREL HYDROCHLORIDE 10 MG: 10 TABLET, FILM COATED ORAL at 16:30

## 2017-03-17 RX ADMIN — PANTOPRAZOLE SODIUM 40 MG: 40 TABLET, DELAYED RELEASE ORAL at 16:30

## 2017-03-17 RX ADMIN — ONDANSETRON 4 MG: 2 INJECTION INTRAMUSCULAR; INTRAVENOUS at 13:01

## 2017-03-17 RX ADMIN — AMLODIPINE BESYLATE 10 MG: 10 TABLET ORAL at 16:31

## 2017-03-17 RX ADMIN — NITROGLYCERIN 1 INCH: 20 OINTMENT TOPICAL at 10:59

## 2017-03-17 RX ADMIN — ASPIRIN 325 MG: 325 TABLET, COATED ORAL at 11:00

## 2017-03-17 RX ADMIN — METOPROLOL SUCCINATE 100 MG: 100 TABLET, EXTENDED RELEASE ORAL at 16:30

## 2017-03-17 RX ADMIN — LISINOPRIL 20 MG: 20 TABLET ORAL at 16:30

## 2017-03-17 RX ADMIN — INSULIN ASPART 3 UNITS: 100 INJECTION, SOLUTION INTRAVENOUS; SUBCUTANEOUS at 16:33

## 2017-03-17 RX ADMIN — ISOSORBIDE MONONITRATE 120 MG: 60 TABLET, EXTENDED RELEASE ORAL at 16:31

## 2017-03-17 NOTE — ED PROVIDER NOTES
Subjective   HPI Comments: Patient with recurrent CP.  Patient with several episodes in the preceeding weeks.  Patient seen multiple times for rule outs, and had cath this last week.  Notable 80% LAD blockage stent stenosis which was opened to 0% by Dr. Aldana from cardiology.  Patient notes he did well for a couple days even doing some work in his yard.  Patient last night had return of his chest pain and shortness of breath consistent with the symptoms he had before his cath, though maybe not as intense on the shortness of breath, but the pain had felt the same.  No f/c.     Patient is a 36 y.o. male presenting with chest pain.   History provided by:  Patient and relative   used: No    Chest Pain   Pain location:  Substernal area  Pain quality: aching    Pain severity:  Moderate  Onset quality:  Gradual  Duration:  1 day  Timing:  Intermittent  Progression:  Waxing and waning  Chronicity:  Recurrent  Context: breathing (increased shortness of breath)    Context: not lifting, not movement, not stress and not trauma    Relieved by:  Nothing  Worsened by:  Exertion and movement  Ineffective treatments:  None tried  Associated symptoms: fatigue, nausea and shortness of breath    Associated symptoms: no abdominal pain, no altered mental status, no anorexia, no anxiety, no back pain, no claudication, no cough, no diaphoresis, no dizziness, no fever, no headache, no near-syncope, no numbness, no orthopnea, no syncope, no vomiting and no weakness    Risk factors: coronary artery disease        Review of Systems   Constitutional: Positive for fatigue. Negative for diaphoresis and fever.   Respiratory: Positive for shortness of breath. Negative for cough.    Cardiovascular: Positive for chest pain. Negative for orthopnea, claudication, syncope and near-syncope.   Gastrointestinal: Positive for nausea. Negative for abdominal pain, anorexia and vomiting.   Musculoskeletal: Negative for back pain.    Neurological: Negative for dizziness, weakness, numbness and headaches.       Past Medical History   Diagnosis Date   • Abdominal pain    • Acute right otitis media    • Allergic rhinitis    • Asthma    • Backache    • Bronchitis    • Cellulitis of lower leg      Right   • Chest pain    • Chronic back pain    • Chronic pain    • Cough    • Diarrhea    • Dyspnea    • GERD (gastroesophageal reflux disease)    • Headache    • Hip pain    • Hypertension    • Insomnia    • Low back pain    • Lumbosacral strain    • Morbid obesity    • RLS (restless legs syndrome)    • Seizures        Allergies   Allergen Reactions   • Glipizide    • Phenergan [Promethazine Hcl]    • Risperdal [Risperidone]    • Tramadol        Past Surgical History   Procedure Laterality Date   • Transesophageal echocardiogram ([facility])       With color flow   • Cardiac catheterization       LAD stent       Family History   Problem Relation Age of Onset   • Cancer Other    • Coronary artery disease Other    • Diabetes Other    • Heart disease Other    • Hypertension Other        Social History     Social History   • Marital status:      Spouse name: N/A   • Number of children: N/A   • Years of education: N/A     Social History Main Topics   • Smoking status: Former Smoker   • Smokeless tobacco: Current User     Types: Snuff   • Alcohol use No   • Drug use: No   • Sexual activity: Not Asked     Other Topics Concern   • None     Social History Narrative   • None           Objective   Physical Exam   Constitutional: He is oriented to person, place, and time. He appears well-developed and well-nourished. He appears distressed (mild anxiety).   HENT:   Head: Normocephalic and atraumatic.   Eyes: Conjunctivae and EOM are normal.   Neck: Normal range of motion. Neck supple. No JVD present.   Cardiovascular: Normal rate, regular rhythm, normal heart sounds and intact distal pulses.  Exam reveals no gallop and no friction rub.    No murmur  heard.  Pulmonary/Chest: Effort normal. No respiratory distress. He has no wheezes. He has no rales. He exhibits no tenderness.   Abdominal: Soft. Bowel sounds are normal. He exhibits no distension and no mass. There is no tenderness. There is no rebound and no guarding.   Musculoskeletal: Normal range of motion. He exhibits edema.   Neurological: He is alert and oriented to person, place, and time.   Skin: Skin is warm and dry.   Psychiatric: He has a normal mood and affect. His behavior is normal. Judgment and thought content normal.   Nursing note and vitals reviewed.      ECG 12 Lead    Date/Time: 3/17/2017 9:42 AM  Performed by: RICARDA SHEETS  Authorized by: RICARDA SHEETS   Interpreted by physician  Comparison: not compared with previous ECG   Rhythm: sinus rhythm  Rate: normal  QRS axis: normal  Conduction: conduction normal  ST Segments: ST segments normal  T Waves: T waves normal  Clinical impression: normal ECG               ED Course  ED Course      Labs Reviewed   COMPREHENSIVE METABOLIC PANEL - Abnormal; Notable for the following:        Result Value    Glucose 216 (*)     Creatinine 0.67 (*)     Sodium 136 (*)     All other components within normal limits   CK - Abnormal; Notable for the following:     Creatine Kinase 382 (*)     All other components within normal limits   CBC WITH AUTO DIFFERENTIAL - Abnormal; Notable for the following:     Hemoglobin 13.4 (*)     RDW 15.0 (*)     RDW-SD 44.0 (*)     Immature Grans % 2.0 (*)     Immature Grans, Absolute 0.18 (*)     All other components within normal limits   TROPONIN (IN-HOUSE) - Normal   PROTIME-INR - Normal    Narrative:     Therapeutic range for most indications is 2.0-3.0 INR,  or 2.5-3.5 for mechanical heart valves.   CK MB - Normal   BNP (IN-HOUSE) - Normal   RAINBOW DRAW    Narrative:     The following orders were created for panel order [place] Draw.  Procedure                               Abnormality         Status                      ---------                               -----------         ------                     Light Blue Top[[id number]]                                    In process                 Green Top (Gel)[[id number]]                                   In process                 Lavender Top[[id number]]                                      In process                 Gold Top - SST[[id number]]                                    In process                   Please view results for these tests on the individual orders.   CBC AND DIFFERENTIAL    Narrative:     The following orders were created for panel order CBC & Differential.  Procedure                               Abnormality         Status                     ---------                               -----------         ------                     CBC Auto Differential[[id number]]         Abnormal            Final result                 Please view results for these tests on the individual orders.   LIGHT BLUE TOP   GREEN TOP   LAVENDER TOP   GOLD TOP - SST       XR Chest 1 View   Final Result   Negative single view chest       Electronically signed by:  Alvaro Ojeda MD  3/17/2017 11:23 AM CDT   Workstation: [id number]        Patient with recurrent chest pain.  Some concern for early restenosis.  Dr. Aldana currently out.  Will admit for further obs.              MDM    Final diagnoses:   Chest pain, unspecified type            Asim Kumar MD  03/17/17 3067

## 2017-03-17 NOTE — PLAN OF CARE
Problem: Patient Care Overview (Adult)  Goal: Plan of Care Review  Outcome: Outcome(s) achieved Date Met:  03/17/17  Goal: Adult Individualization and Mutuality  Outcome: Outcome(s) achieved Date Met:  03/17/17  Goal: Discharge Needs Assessment  Outcome: Outcome(s) achieved Date Met:  03/17/17    Problem: Acute Coronary Syndrome (ACS) (Adult)  Goal: Signs and Symptoms of Listed Potential Problems Will be Absent or Manageable (Acute Coronary Syndrome)  Outcome: Outcome(s) achieved Date Met:  03/17/17

## 2017-03-17 NOTE — H&P
[facility] Medicine Admission      Date of Admission: 3/17/2017      Primary Care Physician: LALA Read      Chief Complaint: Chest pain    HPI:      Concurrent Medical History:  has a past medical history of Abdominal pain; Acute right otitis media; Allergic rhinitis; Asthma; Backache; Bronchitis; Cellulitis of lower leg; Chest pain; Chronic back pain; Chronic pain; Cough; Diarrhea; Dyspnea; GERD (gastroesophageal reflux disease); Headache; Hip pain; Hypertension; Insomnia; Low back pain; Lumbosacral strain; Morbid obesity; RLS (restless legs syndrome); and Seizures.  CAD S/P PTCA less than one prior; Diabetes mellitus II; morbid obesity.    Past Surgical History:  has a past surgical history that includes transesophageal echocardiogram ([facility]) and Cardiac catheterization.    Family History: family history includes Cancer in his other; Coronary artery disease in his other; Diabetes in his other; Heart disease in his other; Hypertension in his other.    Social History:  reports that he has quit smoking. His smokeless tobacco use includes Snuff. He reports that he does not drink alcohol or use illicit drugs.    Allergies:   Allergies   Allergen Reactions   • Glipizide    • Phenergan [Promethazine Hcl]    • Risperdal [Risperidone]    • Tramadol        Medications: Scheduled Meds:  amLODIPine 10 mg Oral Daily   [START ON 3/18/2017] aspirin 81 mg Oral Daily   ipratropium-albuterol 3 mL Nebulization Q6H While Awake - RT   isosorbide mononitrate 120 mg Oral Daily   lisinopril 20 mg Oral Daily   metFORMIN 1,000 mg Oral BID With Meals   metoprolol succinate  mg Oral Daily   pantoprazole 40 mg Oral Daily   pramipexole 1 mg Oral Nightly   prasugrel 10 mg Oral Daily   pravastatin 80 mg Oral Nightly   ranolazine 1,000 mg Oral Q12H   traZODone 300 mg Oral Nightly     Continuous Infusions:   PRN Meds:.hydrALAZINE  •  nitroglycerin  •  ondansetron  •  sodium  chloride    Review of Systems:  Review of Systems   Constitutional: Negative for chills and fever.   Respiratory: Negative for cough, chest tightness, shortness of breath and wheezing.    Cardiovascular: Positive for chest pain.        Chest pain much improved   Gastrointestinal: Positive for nausea. Negative for abdominal pain, blood in stool, constipation, diarrhea and vomiting.        Nausea has resolved   Genitourinary: Negative for decreased urine volume, dysuria and urgency.   Musculoskeletal: Negative for back pain.   Neurological: Negative for dizziness, syncope, facial asymmetry, light-headedness, numbness and headaches.      Otherwise complete ROS is negative except as mentioned above.    Physical Exam:   Temp:  [97.8 °F (36.6 °C)-98.2 °F (36.8 °C)] 98.2 °F (36.8 °C)  Heart Rate:  [70-84] 70  Resp:  [18-24] 22  BP: (136-189)/(58-98) 146/58  Physical Exam   Constitutional: He is oriented to person, place, and time. He appears well-developed and well-nourished. No distress.   HENT:   Head: Normocephalic and atraumatic.   Eyes: Conjunctivae and EOM are normal. Pupils are equal, round, and reactive to light.   Cardiovascular: Normal rate, regular rhythm, normal heart sounds and intact distal pulses.  Exam reveals no gallop and no friction rub.    No murmur heard.  Pulmonary/Chest: Effort normal and breath sounds normal. No respiratory distress. He has no wheezes. He has no rales. He exhibits no tenderness.   Abdominal: Soft. Bowel sounds are normal. He exhibits no distension. There is no tenderness.   Neurological: He is alert and oriented to person, place, and time.   Skin: Skin is warm and dry. He is not diaphoretic.   Psychiatric: He has a normal mood and affect.     Results Reviewed:  I have personally reviewed current lab, radiology, and data and agree with results.  Lab Results (last 24 hours)     Procedure Component Value Units Date/Time    [place] Draw [[id number]] Collected:  03/17/17 1047    Specimen:   Blood Updated:  03/17/17 1051    Narrative:       The following orders were created for panel order [place] Draw.  Procedure                               Abnormality         Status                     ---------                               -----------         ------                     Light Blue Top[[id number]]                                    In process                 Green Top (Gel)[[id number]]                                   In process                 Lavender Top[[id number]]                                      In process                 Gold Top - SST[[id number]]                                    In process                   Please view results for these tests on the individual orders.    Green Top (Gel) [[id number]] Collected:  03/17/17 1047    Specimen:  Blood Updated:  03/17/17 1051    Gold Top - SST [[id number]] Collected:  03/17/17 1047    Specimen:  Blood Updated:  03/17/17 1051    Lavender Top [[id number]] Collected:  03/17/17 1047    Specimen:  Blood Updated:  03/17/17 1051    Light Blue Top [[id number]] Collected:  03/17/17 1047    Specimen:  Blood Updated:  03/17/17 1051    Comprehensive Metabolic Panel [[id number]]  (Abnormal) Collected:  03/17/17 1047    Specimen:  Blood Updated:  03/17/17 1105     Glucose 216 (H) mg/dL      BUN 11 mg/dL      Creatinine 0.67 (L) mg/dL      Sodium 136 (L) mmol/L      Potassium 4.2 mmol/L      Chloride 97 mmol/L      CO2 28.0 mmol/L      Calcium 8.5 mg/dL      Total Protein 7.5 g/dL      Albumin 4.00 g/dL      ALT (SGPT) 31 U/L      AST (SGOT) 40 U/L      Alkaline Phosphatase 83 U/L      Total Bilirubin 0.6 mg/dL      eGFR Non African Amer 133 mL/min/1.73      Globulin 3.5 gm/dL      A/G Ratio 1.1 g/dL      BUN/Creatinine Ratio 16.4      Anion Gap 11.0 mmol/L     CK [[id number]]  (Abnormal) Collected:  03/17/17 1047    Specimen:  Blood Updated:  03/17/17 1105     Creatine Kinase 382 (H) U/L     Protime-INR [[id number]]  (Normal) Collected:  03/17/17 1047    Specimen:  Blood  Updated:  03/17/17 1108     Protime 12.8 Seconds      INR 0.96     Narrative:       Therapeutic range for most indications is 2.0-3.0 INR,  or 2.5-3.5 for mechanical heart valves.    Troponin [[id number]]  (Normal) Collected:  03/17/17 1047    Specimen:  Blood Updated:  03/17/17 1118     Troponin I <0.012 ng/mL     CK-MB [[id number]]  (Normal) Collected:  03/17/17 1047    Specimen:  Blood Updated:  03/17/17 1118     CKMB 3.35 ng/mL     BNP [[id number]]  (Normal) Collected:  03/17/17 1047    Specimen:  Blood Updated:  03/17/17 1120     proBNP 35.5 pg/mL     CBC & Differential [[id number]] Collected:  03/17/17 1047    Specimen:  Blood Updated:  03/17/17 1130    Narrative:       The following orders were created for panel order CBC & Differential.  Procedure                               Abnormality         Status                     ---------                               -----------         ------                     CBC Auto Differential[[id number]]         Abnormal            Final result                 Please view results for these tests on the individual orders.    CBC Auto Differential [[id number]]  (Abnormal) Collected:  03/17/17 1047    Specimen:  Blood Updated:  03/17/17 1130     WBC 8.94 10*3/mm3      RBC 4.95 10*6/mm3      Hemoglobin 13.4 (L) g/dL      Hematocrit 40.1 %      MCV 81.0 fL      MCH 27.1 pg      MCHC 33.4 g/dL      RDW 15.0 (H) %      RDW-SD 44.0 (H) fl      MPV 8.5 fL      Platelets 208 10*3/mm3      Neutrophil % 67.0 %      Lymphocyte % 19.4 %      Monocyte % 8.6 %      Eosinophil % 2.7 %      Basophil % 0.3 %      Immature Grans % 2.0 (H) %      Neutrophils, Absolute 5.99 10*3/mm3      Lymphocytes, Absolute 1.73 10*3/mm3      Monocytes, Absolute 0.77 10*3/mm3      Eosinophils, Absolute 0.24 10*3/mm3      Basophils, Absolute 0.03 10*3/mm3      Immature Grans, Absolute 0.18 (H) 10*3/mm3      nRBC 0.0 /100 WBC         Imaging Results (last 24 hours)     Procedure Component Value Units Date/Time    XR  Chest 1 View [[id number]] Collected:  03/17/17 1121     Updated:  03/17/17 1124    Narrative:       Patient Name:  KEARA ESCOBAR  Patient ID:  [id number]   Ordering:  RICARDA SHEETS  Attending:  RICARDA SHEETS  Referring:  RICARDA SHEETS  ------------------------------------------------        PROCEDURE: Single chest view AP    REASON FOR EXAM:Chest pain protocol    FINDINGS: Comparison study dated March 13, 2017. Cardiac and  pulmonary vasculature are normal. Lungs are clear. Pleural spaces  are normal. No acute osseous abnormality.      Impression:       Negative single view chest     Electronically signed by:  Alvaro Ojeda MD  3/17/2017 11:23 AM CDT  Workstation: [id number]          Assessment:  Chest pain, suspicion of myocardial event low          Plan:  As this patient had a new cardiac standpoint within the last week and has been compliant with his dual antiplatelet regimen, acute cardiac event is extremely unlikely.  Patient only had 1 critical blockage which was corrected by Dr. Briceno.  All other main arteries were clear or noncritical.  It is more likely that this patient was partaking in physical activity so soon after cardiac catheterization and the patient was experiencing post PTCA pain.  At this time patient is not tachycardic, not tachypnea, not hypoxic, and he states that his chest pain is much improved.  It is appropriate at this time to collect 3 sets of cardiac enzymes, rule out, and sent home for follow-up with primary care provider and Dr. Briceno, his regular cardiologist.  As the three sets of cardiac enzymes will be collected after the end of day shift, we will have nurses check with nighttime provider and assure chest pain has resolved and vital signs are stable.     I discussed the patients findings and my recommendations with: Patient, wife, mother, and supervising HENNA Bartholomew  03/17/17  2:40 PM

## 2017-03-21 ENCOUNTER — DOCUMENTATION (OUTPATIENT)
Dept: INTERNAL MEDICINE | Facility: HOSPITAL | Age: 37
End: 2017-03-21

## 2017-03-21 NOTE — DISCHARGE SUMMARY
[facility] Medicine Services  DISCHARGE SUMMARY       Date of Admission: 3/17/2017  Date of Discharge:  3/17/17  Primary Care Physician: LALA Read    Presenting Problem/History of Present Illness:  Chest pain, unspecified type [R07.9]     Final Discharge Diagnoses:  Chest pain, resolved, negative cardiac enzymes    Consults:   Consults     Date and Time Order Name Status Description    3/13/2017 1724 Cardiology - Primary (on-call MD unless specified) Completed     3/8/2017 1950 Inpatient Consult to Cardiology Completed         Pertinent Test Results:     Lab Results (last 7 days)     Procedure Component Value Units Date/Time    Comprehensive Metabolic Panel [[id number]]  (Abnormal) Collected:  03/17/17 1047    Specimen:  Blood Updated:  03/17/17 1105     Glucose 216 (H) mg/dL      BUN 11 mg/dL      Creatinine 0.67 (L) mg/dL      Sodium 136 (L) mmol/L      Potassium 4.2 mmol/L      Chloride 97 mmol/L      CO2 28.0 mmol/L      Calcium 8.5 mg/dL      Total Protein 7.5 g/dL      Albumin 4.00 g/dL      ALT (SGPT) 31 U/L      AST (SGOT) 40 U/L      Alkaline Phosphatase 83 U/L      Total Bilirubin 0.6 mg/dL      eGFR Non African Amer 133 mL/min/1.73      Globulin 3.5 gm/dL      A/G Ratio 1.1 g/dL      BUN/Creatinine Ratio 16.4      Anion Gap 11.0 mmol/L     CK [[id number]]  (Abnormal) Collected:  03/17/17 1047    Specimen:  Blood Updated:  03/17/17 1105     Creatine Kinase 382 (H) U/L     Protime-INR [[id number]]  (Normal) Collected:  03/17/17 1047    Specimen:  Blood Updated:  03/17/17 1108     Protime 12.8 Seconds      INR 0.96     Narrative:       Therapeutic range for most indications is 2.0-3.0 INR,  or 2.5-3.5 for mechanical heart valves.    Troponin [[id number]]  (Normal) Collected:  03/17/17 1047    Specimen:  Blood Updated:  03/17/17 1118     Troponin I <0.012 ng/mL     CK-MB [[id number]]  (Normal) Collected:  03/17/17 1047    Specimen:  Blood Updated:  03/17/17 1118      CKMB 3.35 ng/mL     BNP [[id number]]  (Normal) Collected:  03/17/17 1047    Specimen:  Blood Updated:  03/17/17 1120     proBNP 35.5 pg/mL     CBC & Differential [[id number]] Collected:  03/17/17 1047    Specimen:  Blood Updated:  03/17/17 1130    Narrative:       The following orders were created for panel order CBC & Differential.  Procedure                               Abnormality         Status                     ---------                               -----------         ------                     CBC Auto Differential[[id number]]         Abnormal            Final result                 Please view results for these tests on the individual orders.    CBC Auto Differential [[id number]]  (Abnormal) Collected:  03/17/17 1047    Specimen:  Blood Updated:  03/17/17 1130     WBC 8.94 10*3/mm3      RBC 4.95 10*6/mm3      Hemoglobin 13.4 (L) g/dL      Hematocrit 40.1 %      MCV 81.0 fL      MCH 27.1 pg      MCHC 33.4 g/dL      RDW 15.0 (H) %      RDW-SD 44.0 (H) fl      MPV 8.5 fL      Platelets 208 10*3/mm3      Neutrophil % 67.0 %      Lymphocyte % 19.4 %      Monocyte % 8.6 %      Eosinophil % 2.7 %      Basophil % 0.3 %      Immature Grans % 2.0 (H) %      Neutrophils, Absolute 5.99 10*3/mm3      Lymphocytes, Absolute 1.73 10*3/mm3      Monocytes, Absolute 0.77 10*3/mm3      Eosinophils, Absolute 0.24 10*3/mm3      Basophils, Absolute 0.03 10*3/mm3      Immature Grans, Absolute 0.18 (H) 10*3/mm3      nRBC 0.0 /100 WBC     [place] Draw [[id number]] Collected:  03/17/17 1047    Specimen:  Blood Updated:  03/17/17 1501    Narrative:       The following orders were created for panel order [place] Draw.  Procedure                               Abnormality         Status                     ---------                               -----------         ------                     Light Blue Top[[id number]]                                    Final result               Green Top (Gel)[[id number]]                                   Final  result               Lavender Top[[id number]]                                      Final result               Gold Top - SST[[id number]]                                    Final result                 Please view results for these tests on the individual orders.    Light Blue Top [[id number]] Collected:  03/17/17 1047    Specimen:  Blood Updated:  03/17/17 1501     Extra Tube hold for add-on       Auto resulted       Green Top (Gel) [[id number]] Collected:  03/17/17 1047    Specimen:  Blood Updated:  03/17/17 1501     Extra Tube Hold for add-ons.       Auto resulted.       Lavender Top [[id number]] Collected:  03/17/17 1047    Specimen:  Blood Updated:  03/17/17 1501     Extra Tube hold for add-on       Auto resulted       Gold Top - SST [[id number]] Collected:  03/17/17 1047    Specimen:  Blood Updated:  03/17/17 1501     Extra Tube Hold for add-ons.       Auto resulted.       CK [[id number]]  (Abnormal) Collected:  03/17/17 1440    Specimen:  Blood Updated:  03/17/17 1523     Creatine Kinase 395 (H) U/L     Troponin [[id number]]  (Normal) Collected:  03/17/17 1440    Specimen:  Blood Updated:  03/17/17 1536     Troponin I <0.012 ng/mL     CK-MB [[id number]]  (Normal) Collected:  03/17/17 1440    Specimen:  Blood Updated:  03/17/17 1536     CKMB 3.29 ng/mL     POC Glucose Fingerstick [[id number]]  (Abnormal) Collected:  03/17/17 1542    Specimen:  Blood Updated:  03/17/17 1554     Glucose 165 (H) mg/dL       RN NotifiedMeter: [id number]: [id number] BRYCE JUAREZ       Troponin [[id number]]  (Normal) Collected:  03/17/17 1611    Specimen:  Blood Updated:  03/17/17 1726     Troponin I <0.012 ng/mL         Imaging Results (last 7 days)     Procedure Component Value Units Date/Time    XR Chest 1 View [[id number]] Collected:  03/17/17 1121     Updated:  03/17/17 1124    Narrative:       Patient Name:  KEARA ESCOBAR  Patient ID:  [id number]   Ordering:  RICARDA SHEETS  Attending:  RICARDA SHEETS  Referring:  RICARDA SUAREZ  "KORTNEY  ------------------------------------------------        PROCEDURE: Single chest view AP    REASON FOR EXAM:Chest pain protocol    FINDINGS: Comparison study dated March 13, 2017. Cardiac and  pulmonary vasculature are normal. Lungs are clear. Pleural spaces  are normal. No acute osseous abnormality.      Impression:       Negative single view chest     Electronically signed by:  Alvaro Ojeda MD  3/17/2017 11:23 AM CDT  Workstation: [id number]          [facility] Course:  The patient is a 36 y.o. male who presented to [facility] with chest pain.  Patient was observed throughout 3 sets of cardiac enzymes, which remained within normal limits.  Patient had had a cardiac catheterization within the last week.  Patient had been cleared for outpatient follow-up by his cardiologist.  By the end the observation period, the patient's chest pain had resolved and he had no symptoms.  Furthermore chest x-ray and EKG were within normal limits.  Patient was discharged for outpatient follow-up with his cardiologist and primary care provider within a number of days.  At time of discharge patient denied chest pain, nausea, vomiting, dizziness, syncope, shortness of air.  He stated his symptoms have resolved.  Again it was likely that the patient was having post PTCA pain while doing activity.  He was instructed to partake in only light activity and no strenuous or heavy lifting until following up with his cardiologist and primary care provider.  All of his needed medications were called into pharmacy.  Again patient had been compliant with his antiplatelet regimen.      Condition on Discharge:  Stable    Physical Exam on Discharge:  Visit Vitals   • /68 (BP Location: Right arm, Patient Position: Lying)   • Pulse 84   • Temp 98.1 °F (36.7 °C) (Temporal Artery )   • Resp 20   • Ht 71\" (180.3 cm)   • Wt (!) 472 lb (214 kg)   • SpO2 96%   • BMI 65.83 kg/m2     Physical Exam   Constitutional: He is oriented to " person, place, and time. He appears well-developed and well-nourished. No distress.   HENT:   Head: Normocephalic and atraumatic.   Eyes: Conjunctivae are normal.   Cardiovascular: Normal rate, regular rhythm, normal heart sounds and intact distal pulses.  Exam reveals no gallop and no friction rub.    No murmur heard.  Pulmonary/Chest: Effort normal and breath sounds normal. No respiratory distress. He has no wheezes. He has no rales. He exhibits no tenderness.   Abdominal: Soft. Bowel sounds are normal. He exhibits no distension. There is no tenderness.   Neurological: He is alert and oriented to person, place, and time.   Skin: Skin is warm and dry. He is not diaphoretic.   Psychiatric: He has a normal mood and affect. His behavior is normal. Thought content normal.     Discharge Disposition:  Home or Self Care    Discharge Medications:   Yordy Hansen   Home Medication Instructions ROCKY:[id number]    Printed on:03/21/17 0082   Medication Information                      albuterol (PROVENTIL HFA;VENTOLIN HFA) 108 (90 BASE) MCG/ACT inhaler  Inhale 2 puffs as needed for wheezing.             amLODIPine (NORVASC) 10 MG tablet  Take 10 mg by mouth daily.             aspirin 81 MG tablet  Take 81 mg by mouth Daily.             Canagliflozin (INVOKANA) 100 MG tablet  Take 1 tablet by mouth daily.             FENOFIBRATE MICRONIZED PO  Take 1 tablet by mouth every night.             ipratropium-albuterol (DUO-NEB) 0.5-2.5 mg/mL nebulizer  Inhale 3 mL Every 4 (Four) Hours As Needed.             isosorbide mononitrate (IMDUR) 120 MG 24 hr tablet  Take 1 tablet by mouth Daily.             isosorbide mononitrate (IMDUR) 60 MG 24 hr tablet  Take 120 mg by mouth Daily.             Liraglutide (VICTOZA) 18 MG/3ML solution pen-injector  Inject  under the skin.             lisinopril (PRINIVIL,ZESTRIL) 20 MG tablet  Take 20 mg by mouth daily.             lisinopril-hydrochlorothiazide (PRINZIDE,ZESTORETIC) 20-25 MG per  tablet  Take 1 tablet by mouth.             metFORMIN (GLUCOPHAGE) 1000 MG tablet  Take 1,000 mg by mouth 2 (Two) Times a Day With Meals.             metoprolol succinate XL (TOPROL-XL) 100 MG 24 hr tablet  Take 1 tablet by mouth Daily.             metoprolol succinate XL (TOPROL-XL) 100 MG 24 hr tablet  Take 1 tablet by mouth Daily.             MICROLET LANCETS misc  USE TO TEST BLOOD SUGAR EVERY DAY AND AS NEEDED             nitroglycerin (NITROSTAT) 0.4 MG SL tablet  Place 1 tablet under the tongue Every 5 (Five) Minutes As Needed for chest pain.             pantoprazole (PROTONIX) 40 MG EC tablet  Take 40 mg by mouth Daily.             pramipexole (MIRAPEX) 1 MG tablet  Take 1 mg by mouth Every Night. Taking 2mg daily             prasugrel (EFFIENT) 10 MG tablet  Take 1 tablet by mouth Daily.             pravastatin (PRAVACHOL) 40 MG tablet  Take 80 mg by mouth Every Night.             ranolazine (RANEXA) 1000 MG 12 hr tablet  Take 1 tablet by mouth Every 12 (Twelve) Hours.             ranolazine (RANEXA) 500 MG 12 hr tablet  Take 2 tablets by mouth 2 (Two) Times a Day.             traZODone (DESYREL) 300 MG tablet  Take 300 mg by mouth.                 Discharge Diet:   Diet Instructions     Diet: Consistent Carbohydrate, Cardiac; Thin Liquids, No Restrictions       Discharge Diet:   Consistent Carbohydrate  Cardiac      Fluid Consistency:  Thin Liquids, No Restrictions                 Activity at Discharge:   Activity Instructions     Discharge Activity Restrictions       No heavy activity until cleared by PCP and cardiologist                 Discharge Care Plan/Instructions: Return with chest pain, nausea, vomiting, dizziness, shortness of air, sig B, presyncope, or any worsening condition.    HENNA Rodriguez  03/21/17  11:38 AM

## 2017-03-25 ENCOUNTER — APPOINTMENT (OUTPATIENT)
Dept: CT IMAGING | Facility: HOSPITAL | Age: 37
End: 2017-03-25

## 2017-03-25 ENCOUNTER — APPOINTMENT (OUTPATIENT)
Dept: GENERAL RADIOLOGY | Facility: HOSPITAL | Age: 37
End: 2017-03-25

## 2017-03-25 ENCOUNTER — APPOINTMENT (OUTPATIENT)
Dept: CARDIOLOGY | Facility: HOSPITAL | Age: 37
End: 2017-03-25
Attending: FAMILY MEDICINE

## 2017-03-25 ENCOUNTER — HOSPITAL ENCOUNTER (INPATIENT)
Facility: HOSPITAL | Age: 37
LOS: 2 days | Discharge: HOME | End: 2017-03-29
Attending: EMERGENCY MEDICINE | Admitting: INTERNAL MEDICINE

## 2017-03-25 DIAGNOSIS — I26.99 OTHER ACUTE PULMONARY EMBOLISM WITHOUT ACUTE COR PULMONALE (HCC): ICD-10-CM

## 2017-03-25 DIAGNOSIS — R06.00 DYSPNEA, UNSPECIFIED TYPE: ICD-10-CM

## 2017-03-25 DIAGNOSIS — R07.9 CHEST PAIN, UNSPECIFIED TYPE: Primary | ICD-10-CM

## 2017-03-25 LAB
ALBUMIN SERPL-MCNC: 4.1 G/DL (ref 3.4–4.8)
ALBUMIN/GLOB SERPL: 1.1 G/DL (ref 1.1–1.8)
ALP SERPL-CCNC: 81 U/L (ref 38–126)
ALT SERPL W P-5'-P-CCNC: 54 U/L (ref 21–72)
ANION GAP SERPL CALCULATED.3IONS-SCNC: 14 MMOL/L (ref 5–15)
AST SERPL-CCNC: 45 U/L (ref 17–59)
BASOPHILS # BLD AUTO: 0.02 10*3/MM3 (ref 0–0.2)
BASOPHILS NFR BLD AUTO: 0.2 % (ref 0–2)
BH CV ECHO MEAS - BSA(HAYCOCK): 3.4 M^2
BH CV ECHO MEAS - BSA: 3 M^2
BH CV ECHO MEAS - BZI_BMI: 64.4 KILOGRAMS/M^2
BH CV ECHO MEAS - BZI_METRIC_HEIGHT: 180.3 CM
BH CV ECHO MEAS - BZI_METRIC_WEIGHT: 209.6 KG
BH CV ECHO MEAS - EF(MOD-SP4): 55 %
BILIRUB SERPL-MCNC: 0.6 MG/DL (ref 0.2–1.3)
BUN BLD-MCNC: 16 MG/DL (ref 7–21)
BUN/CREAT SERPL: 18.2 (ref 7–25)
CALCIUM SPEC-SCNC: 9 MG/DL (ref 8.4–10.2)
CHLORIDE SERPL-SCNC: 98 MMOL/L (ref 95–110)
CK MB SERPL-CCNC: 2.78 NG/ML (ref 0–5)
CK SERPL-CCNC: 452 U/L (ref 55–170)
CO2 SERPL-SCNC: 23 MMOL/L (ref 22–31)
CREAT BLD-MCNC: 0.88 MG/DL (ref 0.7–1.3)
DEPRECATED RDW RBC AUTO: 42.3 FL (ref 35.1–43.9)
EOSINOPHIL # BLD AUTO: 0.26 10*3/MM3 (ref 0–0.7)
EOSINOPHIL NFR BLD AUTO: 2.9 % (ref 0–7)
ERYTHROCYTE [DISTWIDTH] IN BLOOD BY AUTOMATED COUNT: 14.4 % (ref 11.5–14.5)
GFR SERPL CREATININE-BSD FRML MDRD: 97 ML/MIN/1.73 (ref 70–162)
GLOBULIN UR ELPH-MCNC: 3.6 GM/DL (ref 2.3–3.5)
GLUCOSE BLD-MCNC: 129 MG/DL (ref 60–100)
GLUCOSE BLDC GLUCOMTR-MCNC: 134 MG/DL (ref 70–130)
GLUCOSE BLDC GLUCOMTR-MCNC: 164 MG/DL (ref 70–130)
GLUCOSE BLDC GLUCOMTR-MCNC: 167 MG/DL (ref 70–130)
GLUCOSE BLDC GLUCOMTR-MCNC: 297 MG/DL (ref 70–130)
HCT VFR BLD AUTO: 37.9 % (ref 39–49)
HGB BLD-MCNC: 13.1 G/DL (ref 13.7–17.3)
HOLD SPECIMEN: NORMAL
HOLD SPECIMEN: NORMAL
IMM GRANULOCYTES # BLD: 0.08 10*3/MM3 (ref 0–0.02)
IMM GRANULOCYTES NFR BLD: 0.9 % (ref 0–0.5)
LV EF 2D ECHO EST: 50 %
LYMPHOCYTES # BLD AUTO: 2.43 10*3/MM3 (ref 0.6–4.2)
LYMPHOCYTES NFR BLD AUTO: 27 % (ref 10–50)
MCH RBC QN AUTO: 27.9 PG (ref 26.5–34)
MCHC RBC AUTO-ENTMCNC: 34.6 G/DL (ref 31.5–36.3)
MCV RBC AUTO: 80.6 FL (ref 80–98)
MONOCYTES # BLD AUTO: 0.97 10*3/MM3 (ref 0–0.9)
MONOCYTES NFR BLD AUTO: 10.8 % (ref 0–12)
NEUTROPHILS # BLD AUTO: 5.23 10*3/MM3 (ref 2–8.6)
NEUTROPHILS NFR BLD AUTO: 58.2 % (ref 37–80)
NT-PROBNP SERPL-MCNC: 14.3 PG/ML (ref 0–450)
PLATELET # BLD AUTO: 199 10*3/MM3 (ref 150–450)
PMV BLD AUTO: 9.1 FL (ref 8–12)
POTASSIUM BLD-SCNC: 4.7 MMOL/L (ref 3.5–5.1)
PROT SERPL-MCNC: 7.7 G/DL (ref 6.3–8.6)
RBC # BLD AUTO: 4.7 10*6/MM3 (ref 4.37–5.74)
SODIUM BLD-SCNC: 135 MMOL/L (ref 137–145)
TROPONIN I SERPL-MCNC: <0.012 NG/ML
WBC NRBC COR # BLD: 8.99 10*3/MM3 (ref 3.2–9.8)
WHOLE BLOOD HOLD SPECIMEN: NORMAL
WHOLE BLOOD HOLD SPECIMEN: NORMAL

## 2017-03-25 PROCEDURE — 85025 COMPLETE CBC W/AUTO DIFF WBC: CPT | Performed by: EMERGENCY MEDICINE

## 2017-03-25 PROCEDURE — 93010 ELECTROCARDIOGRAM REPORT: CPT | Performed by: INTERNAL MEDICINE

## 2017-03-25 PROCEDURE — 25010000002 MORPHINE SULFATE (PF) 2 MG/ML SOLUTION: Performed by: INTERNAL MEDICINE

## 2017-03-25 PROCEDURE — 93308 TTE F-UP OR LMTD: CPT

## 2017-03-25 PROCEDURE — 63710000001 INSULIN ASPART PER 5 UNITS: Performed by: INTERNAL MEDICINE

## 2017-03-25 PROCEDURE — 83880 ASSAY OF NATRIURETIC PEPTIDE: CPT | Performed by: EMERGENCY MEDICINE

## 2017-03-25 PROCEDURE — G0378 HOSPITAL OBSERVATION PER HR: HCPCS

## 2017-03-25 PROCEDURE — 93005 ELECTROCARDIOGRAM TRACING: CPT

## 2017-03-25 PROCEDURE — 99225 PR SBSQ OBSERVATION CARE/DAY 25 MINUTES: CPT | Performed by: INTERNAL MEDICINE

## 2017-03-25 PROCEDURE — 84484 ASSAY OF TROPONIN QUANT: CPT | Performed by: EMERGENCY MEDICINE

## 2017-03-25 PROCEDURE — 84484 ASSAY OF TROPONIN QUANT: CPT | Performed by: FAMILY MEDICINE

## 2017-03-25 PROCEDURE — 82962 GLUCOSE BLOOD TEST: CPT

## 2017-03-25 PROCEDURE — 71010 HC CHEST PA OR AP: CPT

## 2017-03-25 PROCEDURE — 25010000002 ENOXAPARIN PER 10 MG: Performed by: INTERNAL MEDICINE

## 2017-03-25 PROCEDURE — 25010000002 ONDANSETRON PER 1 MG: Performed by: EMERGENCY MEDICINE

## 2017-03-25 PROCEDURE — 80053 COMPREHEN METABOLIC PANEL: CPT | Performed by: EMERGENCY MEDICINE

## 2017-03-25 PROCEDURE — 93308 TTE F-UP OR LMTD: CPT | Performed by: INTERNAL MEDICINE

## 2017-03-25 PROCEDURE — 82553 CREATINE MB FRACTION: CPT | Performed by: EMERGENCY MEDICINE

## 2017-03-25 PROCEDURE — 99285 EMERGENCY DEPT VISIT HI MDM: CPT

## 2017-03-25 PROCEDURE — 82550 ASSAY OF CK (CPK): CPT | Performed by: EMERGENCY MEDICINE

## 2017-03-25 RX ORDER — MAGNESIUM HYDROXIDE/ALUMINUM HYDROXICE/SIMETHICONE 120; 1200; 1200 MG/30ML; MG/30ML; MG/30ML
30 SUSPENSION ORAL ONCE
Status: COMPLETED | OUTPATIENT
Start: 2017-03-25 | End: 2017-03-25

## 2017-03-25 RX ORDER — METOPROLOL SUCCINATE 100 MG/1
100 TABLET, EXTENDED RELEASE ORAL DAILY
Status: DISCONTINUED | OUTPATIENT
Start: 2017-03-25 | End: 2017-03-29 | Stop reason: HOSPADM

## 2017-03-25 RX ORDER — PRAVASTATIN SODIUM 40 MG
80 TABLET ORAL NIGHTLY
Status: DISCONTINUED | OUTPATIENT
Start: 2017-03-25 | End: 2017-03-29 | Stop reason: HOSPADM

## 2017-03-25 RX ORDER — SODIUM CHLORIDE 0.9 % (FLUSH) 0.9 %
10 SYRINGE (ML) INJECTION AS NEEDED
Status: DISCONTINUED | OUTPATIENT
Start: 2017-03-25 | End: 2017-03-25 | Stop reason: SDUPTHER

## 2017-03-25 RX ORDER — ASPIRIN 325 MG
325 TABLET ORAL ONCE
Status: COMPLETED | OUTPATIENT
Start: 2017-03-25 | End: 2017-03-25

## 2017-03-25 RX ORDER — PRAMIPEXOLE DIHYDROCHLORIDE 1 MG/1
1 TABLET ORAL NIGHTLY
Status: DISCONTINUED | OUTPATIENT
Start: 2017-03-25 | End: 2017-03-29 | Stop reason: HOSPADM

## 2017-03-25 RX ORDER — IPRATROPIUM BROMIDE AND ALBUTEROL SULFATE 2.5; .5 MG/3ML; MG/3ML
3 SOLUTION RESPIRATORY (INHALATION) EVERY 4 HOURS PRN
Status: DISCONTINUED | OUTPATIENT
Start: 2017-03-25 | End: 2017-03-29 | Stop reason: HOSPADM

## 2017-03-25 RX ORDER — HYDRALAZINE HYDROCHLORIDE 25 MG/1
25 TABLET, FILM COATED ORAL EVERY 8 HOURS SCHEDULED
Status: DISCONTINUED | OUTPATIENT
Start: 2017-03-25 | End: 2017-03-29 | Stop reason: HOSPADM

## 2017-03-25 RX ORDER — ISOSORBIDE MONONITRATE 60 MG/1
120 TABLET, EXTENDED RELEASE ORAL DAILY
Status: DISCONTINUED | OUTPATIENT
Start: 2017-03-25 | End: 2017-03-29 | Stop reason: HOSPADM

## 2017-03-25 RX ORDER — RANOLAZINE 500 MG/1
1000 TABLET, EXTENDED RELEASE ORAL EVERY 12 HOURS SCHEDULED
Status: DISCONTINUED | OUTPATIENT
Start: 2017-03-25 | End: 2017-03-29 | Stop reason: HOSPADM

## 2017-03-25 RX ORDER — PRASUGREL 10 MG/1
10 TABLET, FILM COATED ORAL DAILY
Status: DISCONTINUED | OUTPATIENT
Start: 2017-03-25 | End: 2017-03-29 | Stop reason: HOSPADM

## 2017-03-25 RX ORDER — AMLODIPINE BESYLATE 10 MG/1
10 TABLET ORAL DAILY
Status: DISCONTINUED | OUTPATIENT
Start: 2017-03-25 | End: 2017-03-29 | Stop reason: HOSPADM

## 2017-03-25 RX ORDER — PANTOPRAZOLE SODIUM 40 MG/1
40 TABLET, DELAYED RELEASE ORAL DAILY
Status: DISCONTINUED | OUTPATIENT
Start: 2017-03-25 | End: 2017-03-29 | Stop reason: HOSPADM

## 2017-03-25 RX ORDER — MAGNESIUM HYDROXIDE/ALUMINUM HYDROXICE/SIMETHICONE 120; 1200; 1200 MG/30ML; MG/30ML; MG/30ML
30 SUSPENSION ORAL EVERY 4 HOURS PRN
Status: DISCONTINUED | OUTPATIENT
Start: 2017-03-25 | End: 2017-03-29 | Stop reason: HOSPADM

## 2017-03-25 RX ORDER — TRAZODONE HYDROCHLORIDE 150 MG/1
300 TABLET ORAL NIGHTLY
Status: DISCONTINUED | OUTPATIENT
Start: 2017-03-25 | End: 2017-03-29 | Stop reason: HOSPADM

## 2017-03-25 RX ORDER — MORPHINE SULFATE 2 MG/ML
1 INJECTION, SOLUTION INTRAMUSCULAR; INTRAVENOUS
Status: DISCONTINUED | OUTPATIENT
Start: 2017-03-25 | End: 2017-03-29 | Stop reason: HOSPADM

## 2017-03-25 RX ORDER — ASPIRIN 325 MG
325 TABLET ORAL ONCE
Status: DISCONTINUED | OUTPATIENT
Start: 2017-03-25 | End: 2017-03-25 | Stop reason: SDUPTHER

## 2017-03-25 RX ORDER — METOPROLOL TARTRATE 5 MG/5ML
5 INJECTION INTRAVENOUS EVERY 6 HOURS PRN
Status: DISCONTINUED | OUTPATIENT
Start: 2017-03-25 | End: 2017-03-29 | Stop reason: HOSPADM

## 2017-03-25 RX ORDER — SODIUM CHLORIDE 0.9 % (FLUSH) 0.9 %
1-10 SYRINGE (ML) INJECTION AS NEEDED
Status: DISCONTINUED | OUTPATIENT
Start: 2017-03-25 | End: 2017-03-29 | Stop reason: HOSPADM

## 2017-03-25 RX ORDER — ALBUTEROL SULFATE 90 UG/1
2 AEROSOL, METERED RESPIRATORY (INHALATION) AS NEEDED
Status: DISCONTINUED | OUTPATIENT
Start: 2017-03-25 | End: 2017-03-25 | Stop reason: ALTCHOICE

## 2017-03-25 RX ORDER — LISINOPRIL 20 MG/1
20 TABLET ORAL DAILY
Status: DISCONTINUED | OUTPATIENT
Start: 2017-03-25 | End: 2017-03-29 | Stop reason: HOSPADM

## 2017-03-25 RX ORDER — ONDANSETRON 2 MG/ML
4 INJECTION INTRAMUSCULAR; INTRAVENOUS ONCE
Status: COMPLETED | OUTPATIENT
Start: 2017-03-25 | End: 2017-03-25

## 2017-03-25 RX ORDER — DEXTROSE MONOHYDRATE 25 G/50ML
25 INJECTION, SOLUTION INTRAVENOUS
Status: DISCONTINUED | OUTPATIENT
Start: 2017-03-25 | End: 2017-03-29 | Stop reason: HOSPADM

## 2017-03-25 RX ORDER — NICOTINE POLACRILEX 4 MG
15 LOZENGE BUCCAL
Status: DISCONTINUED | OUTPATIENT
Start: 2017-03-25 | End: 2017-03-29 | Stop reason: HOSPADM

## 2017-03-25 RX ORDER — ASPIRIN 81 MG/1
81 TABLET, CHEWABLE ORAL DAILY
Status: CANCELLED | OUTPATIENT
Start: 2017-03-25

## 2017-03-25 RX ORDER — ONDANSETRON 2 MG/ML
4 INJECTION INTRAMUSCULAR; INTRAVENOUS EVERY 8 HOURS PRN
Status: DISCONTINUED | OUTPATIENT
Start: 2017-03-25 | End: 2017-03-29 | Stop reason: HOSPADM

## 2017-03-25 RX ADMIN — PRAVASTATIN SODIUM 80 MG: 40 TABLET ORAL at 21:18

## 2017-03-25 RX ADMIN — PANTOPRAZOLE SODIUM 40 MG: 40 TABLET, DELAYED RELEASE ORAL at 09:03

## 2017-03-25 RX ADMIN — MORPHINE SULFATE 1 MG: 2 INJECTION, SOLUTION INTRAMUSCULAR; INTRAVENOUS at 08:24

## 2017-03-25 RX ADMIN — HYDRALAZINE HYDROCHLORIDE 25 MG: 25 TABLET, FILM COATED ORAL at 21:18

## 2017-03-25 RX ADMIN — HYDRALAZINE HYDROCHLORIDE 25 MG: 25 TABLET, FILM COATED ORAL at 15:03

## 2017-03-25 RX ADMIN — RANOLAZINE 1000 MG: 500 TABLET, FILM COATED, EXTENDED RELEASE ORAL at 09:05

## 2017-03-25 RX ADMIN — INSULIN ASPART 6 UNITS: 100 INJECTION, SOLUTION INTRAVENOUS; SUBCUTANEOUS at 21:18

## 2017-03-25 RX ADMIN — PRAMIPEXOLE DIHYDROCHLORIDE 1 MG: 1 TABLET ORAL at 21:18

## 2017-03-25 RX ADMIN — PRASUGREL HYDROCHLORIDE 10 MG: 10 TABLET, FILM COATED ORAL at 09:04

## 2017-03-25 RX ADMIN — METFORMIN HYDROCHLORIDE 1000 MG: 500 TABLET ORAL at 09:04

## 2017-03-25 RX ADMIN — RANOLAZINE 1000 MG: 500 TABLET, FILM COATED, EXTENDED RELEASE ORAL at 21:18

## 2017-03-25 RX ADMIN — ONDANSETRON 4 MG: 2 INJECTION INTRAMUSCULAR; INTRAVENOUS at 03:06

## 2017-03-25 RX ADMIN — ASPIRIN 325 MG: 325 TABLET, COATED ORAL at 02:25

## 2017-03-25 RX ADMIN — TRAZODONE HYDROCHLORIDE 300 MG: 150 TABLET ORAL at 21:18

## 2017-03-25 RX ADMIN — LIDOCAINE HYDROCHLORIDE 15 ML: 20 SOLUTION ORAL; TOPICAL at 04:59

## 2017-03-25 RX ADMIN — ALUMINUM HYDROXIDE, MAGNESIUM HYDROXIDE, AND SIMETHICONE 30 ML: 200; 200; 20 SUSPENSION ORAL at 04:59

## 2017-03-25 RX ADMIN — LISINOPRIL 20 MG: 20 TABLET ORAL at 09:04

## 2017-03-25 RX ADMIN — MORPHINE SULFATE 1 MG: 2 INJECTION, SOLUTION INTRAMUSCULAR; INTRAVENOUS at 15:04

## 2017-03-25 RX ADMIN — ISOSORBIDE MONONITRATE 120 MG: 60 TABLET, EXTENDED RELEASE ORAL at 09:03

## 2017-03-25 RX ADMIN — AMLODIPINE BESYLATE 10 MG: 10 TABLET ORAL at 09:04

## 2017-03-25 RX ADMIN — ENOXAPARIN SODIUM 40 MG: 40 INJECTION SUBCUTANEOUS at 09:02

## 2017-03-25 RX ADMIN — METOPROLOL SUCCINATE 100 MG: 100 TABLET, EXTENDED RELEASE ORAL at 09:03

## 2017-03-26 ENCOUNTER — APPOINTMENT (OUTPATIENT)
Dept: CT IMAGING | Facility: HOSPITAL | Age: 37
End: 2017-03-26

## 2017-03-26 ENCOUNTER — APPOINTMENT (OUTPATIENT)
Dept: ULTRASOUND IMAGING | Facility: HOSPITAL | Age: 37
End: 2017-03-26

## 2017-03-26 ENCOUNTER — APPOINTMENT (OUTPATIENT)
Dept: INTERVENTIONAL RADIOLOGY/VASCULAR | Facility: HOSPITAL | Age: 37
End: 2017-03-26

## 2017-03-26 LAB
ANION GAP SERPL CALCULATED.3IONS-SCNC: 11 MMOL/L (ref 5–15)
BUN BLD-MCNC: 13 MG/DL (ref 7–21)
BUN/CREAT SERPL: 17.3 (ref 7–25)
CALCIUM SPEC-SCNC: 8.9 MG/DL (ref 8.4–10.2)
CHLORIDE SERPL-SCNC: 98 MMOL/L (ref 95–110)
CO2 SERPL-SCNC: 27 MMOL/L (ref 22–31)
CREAT BLD-MCNC: 0.75 MG/DL (ref 0.7–1.3)
DEPRECATED RDW RBC AUTO: 43.8 FL (ref 35.1–43.9)
ERYTHROCYTE [DISTWIDTH] IN BLOOD BY AUTOMATED COUNT: 14.8 % (ref 11.5–14.5)
GFR SERPL CREATININE-BSD FRML MDRD: 117 ML/MIN/1.73 (ref 70–162)
GLUCOSE BLD-MCNC: 179 MG/DL (ref 60–100)
GLUCOSE BLDC GLUCOMTR-MCNC: 148 MG/DL (ref 70–130)
GLUCOSE BLDC GLUCOMTR-MCNC: 154 MG/DL (ref 70–130)
GLUCOSE BLDC GLUCOMTR-MCNC: 168 MG/DL (ref 70–130)
GLUCOSE BLDC GLUCOMTR-MCNC: 189 MG/DL (ref 70–130)
HCT VFR BLD AUTO: 38.4 % (ref 39–49)
HGB BLD-MCNC: 12.9 G/DL (ref 13.7–17.3)
MCH RBC QN AUTO: 27.5 PG (ref 26.5–34)
MCHC RBC AUTO-ENTMCNC: 33.6 G/DL (ref 31.5–36.3)
MCV RBC AUTO: 81.9 FL (ref 80–98)
PLATELET # BLD AUTO: 186 10*3/MM3 (ref 150–450)
PMV BLD AUTO: 9.1 FL (ref 8–12)
POTASSIUM BLD-SCNC: 4.4 MMOL/L (ref 3.5–5.1)
RBC # BLD AUTO: 4.69 10*6/MM3 (ref 4.37–5.74)
SODIUM BLD-SCNC: 136 MMOL/L (ref 137–145)
WBC NRBC COR # BLD: 7.64 10*3/MM3 (ref 3.2–9.8)

## 2017-03-26 PROCEDURE — 85027 COMPLETE CBC AUTOMATED: CPT | Performed by: FAMILY MEDICINE

## 2017-03-26 PROCEDURE — 94760 N-INVAS EAR/PLS OXIMETRY 1: CPT

## 2017-03-26 PROCEDURE — 76937 US GUIDE VASCULAR ACCESS: CPT

## 2017-03-26 PROCEDURE — 25010000002 ENOXAPARIN PER 10 MG: Performed by: FAMILY MEDICINE

## 2017-03-26 PROCEDURE — 82962 GLUCOSE BLOOD TEST: CPT

## 2017-03-26 PROCEDURE — 94799 UNLISTED PULMONARY SVC/PX: CPT

## 2017-03-26 PROCEDURE — 0 IOPAMIDOL PER 1 ML: Performed by: INTERNAL MEDICINE

## 2017-03-26 PROCEDURE — 25010000002 MORPHINE SULFATE (PF) 2 MG/ML SOLUTION: Performed by: INTERNAL MEDICINE

## 2017-03-26 PROCEDURE — C1751 CATH, INF, PER/CENT/MIDLINE: HCPCS

## 2017-03-26 PROCEDURE — 94640 AIRWAY INHALATION TREATMENT: CPT

## 2017-03-26 PROCEDURE — 80048 BASIC METABOLIC PNL TOTAL CA: CPT | Performed by: FAMILY MEDICINE

## 2017-03-26 PROCEDURE — G0378 HOSPITAL OBSERVATION PER HR: HCPCS

## 2017-03-26 PROCEDURE — 63710000001 INSULIN ASPART PER 5 UNITS: Performed by: INTERNAL MEDICINE

## 2017-03-26 PROCEDURE — 75574 CT ANGIO HRT W/3D IMAGE: CPT

## 2017-03-26 PROCEDURE — 99225 PR SBSQ OBSERVATION CARE/DAY 25 MINUTES: CPT | Performed by: INTERNAL MEDICINE

## 2017-03-26 RX ORDER — IPRATROPIUM BROMIDE AND ALBUTEROL SULFATE 2.5; .5 MG/3ML; MG/3ML
3 SOLUTION RESPIRATORY (INHALATION)
Status: DISCONTINUED | OUTPATIENT
Start: 2017-03-26 | End: 2017-03-29 | Stop reason: HOSPADM

## 2017-03-26 RX ORDER — BUDESONIDE AND FORMOTEROL FUMARATE DIHYDRATE 160; 4.5 UG/1; UG/1
2 AEROSOL RESPIRATORY (INHALATION)
Status: DISCONTINUED | OUTPATIENT
Start: 2017-03-26 | End: 2017-03-29 | Stop reason: HOSPADM

## 2017-03-26 RX ORDER — SODIUM CHLORIDE 9 MG/ML
100 INJECTION, SOLUTION INTRAVENOUS CONTINUOUS
Status: DISCONTINUED | OUTPATIENT
Start: 2017-03-26 | End: 2017-03-29 | Stop reason: HOSPADM

## 2017-03-26 RX ADMIN — MORPHINE SULFATE 1 MG: 2 INJECTION, SOLUTION INTRAMUSCULAR; INTRAVENOUS at 13:37

## 2017-03-26 RX ADMIN — MORPHINE SULFATE 1 MG: 2 INJECTION, SOLUTION INTRAMUSCULAR; INTRAVENOUS at 22:01

## 2017-03-26 RX ADMIN — SODIUM CHLORIDE 100 ML/HR: 9 INJECTION, SOLUTION INTRAVENOUS at 11:33

## 2017-03-26 RX ADMIN — HYDRALAZINE HYDROCHLORIDE 25 MG: 25 TABLET, FILM COATED ORAL at 06:09

## 2017-03-26 RX ADMIN — IPRATROPIUM BROMIDE AND ALBUTEROL SULFATE 3 ML: 2.5; .5 SOLUTION RESPIRATORY (INHALATION) at 23:29

## 2017-03-26 RX ADMIN — IPRATROPIUM BROMIDE AND ALBUTEROL SULFATE 3 ML: 2.5; .5 SOLUTION RESPIRATORY (INHALATION) at 14:29

## 2017-03-26 RX ADMIN — PRASUGREL HYDROCHLORIDE 10 MG: 10 TABLET, FILM COATED ORAL at 08:17

## 2017-03-26 RX ADMIN — MORPHINE SULFATE 1 MG: 2 INJECTION, SOLUTION INTRAMUSCULAR; INTRAVENOUS at 08:21

## 2017-03-26 RX ADMIN — BUDESONIDE AND FORMOTEROL FUMARATE DIHYDRATE 2 PUFF: 160; 4.5 AEROSOL RESPIRATORY (INHALATION) at 13:49

## 2017-03-26 RX ADMIN — IOPAMIDOL 94 ML: 755 INJECTION, SOLUTION INTRAVENOUS at 10:39

## 2017-03-26 RX ADMIN — METOPROLOL SUCCINATE 100 MG: 100 TABLET, EXTENDED RELEASE ORAL at 08:25

## 2017-03-26 RX ADMIN — HYDRALAZINE HYDROCHLORIDE 25 MG: 25 TABLET, FILM COATED ORAL at 21:00

## 2017-03-26 RX ADMIN — PRAVASTATIN SODIUM 80 MG: 40 TABLET ORAL at 20:58

## 2017-03-26 RX ADMIN — MORPHINE SULFATE 1 MG: 2 INJECTION, SOLUTION INTRAMUSCULAR; INTRAVENOUS at 15:32

## 2017-03-26 RX ADMIN — AMLODIPINE BESYLATE 10 MG: 10 TABLET ORAL at 08:18

## 2017-03-26 RX ADMIN — PRAMIPEXOLE DIHYDROCHLORIDE 1 MG: 1 TABLET ORAL at 20:58

## 2017-03-26 RX ADMIN — INSULIN ASPART 2 UNITS: 100 INJECTION, SOLUTION INTRAVENOUS; SUBCUTANEOUS at 20:58

## 2017-03-26 RX ADMIN — RANOLAZINE 1000 MG: 500 TABLET, FILM COATED, EXTENDED RELEASE ORAL at 08:21

## 2017-03-26 RX ADMIN — ENOXAPARIN SODIUM 60 MG: 60 INJECTION SUBCUTANEOUS at 14:11

## 2017-03-26 RX ADMIN — BUDESONIDE AND FORMOTEROL FUMARATE DIHYDRATE 2 PUFF: 160; 4.5 AEROSOL RESPIRATORY (INHALATION) at 19:57

## 2017-03-26 RX ADMIN — RANOLAZINE 1000 MG: 500 TABLET, FILM COATED, EXTENDED RELEASE ORAL at 20:58

## 2017-03-26 RX ADMIN — TRAZODONE HYDROCHLORIDE 300 MG: 150 TABLET ORAL at 20:57

## 2017-03-26 RX ADMIN — INSULIN ASPART 2 UNITS: 100 INJECTION, SOLUTION INTRAVENOUS; SUBCUTANEOUS at 18:10

## 2017-03-26 RX ADMIN — MORPHINE SULFATE 1 MG: 2 INJECTION, SOLUTION INTRAMUSCULAR; INTRAVENOUS at 16:49

## 2017-03-26 RX ADMIN — HYDRALAZINE HYDROCHLORIDE 25 MG: 25 TABLET, FILM COATED ORAL at 13:42

## 2017-03-26 RX ADMIN — PANTOPRAZOLE SODIUM 40 MG: 40 TABLET, DELAYED RELEASE ORAL at 08:18

## 2017-03-26 RX ADMIN — IPRATROPIUM BROMIDE AND ALBUTEROL SULFATE 3 ML: 2.5; .5 SOLUTION RESPIRATORY (INHALATION) at 19:57

## 2017-03-26 RX ADMIN — INSULIN ASPART 2 UNITS: 100 INJECTION, SOLUTION INTRAVENOUS; SUBCUTANEOUS at 08:14

## 2017-03-26 RX ADMIN — ISOSORBIDE MONONITRATE 120 MG: 60 TABLET, EXTENDED RELEASE ORAL at 08:21

## 2017-03-26 RX ADMIN — LISINOPRIL 20 MG: 20 TABLET ORAL at 08:17

## 2017-03-26 RX ADMIN — MORPHINE SULFATE 1 MG: 2 INJECTION, SOLUTION INTRAMUSCULAR; INTRAVENOUS at 18:10

## 2017-03-27 ENCOUNTER — APPOINTMENT (OUTPATIENT)
Dept: CT IMAGING | Facility: HOSPITAL | Age: 37
End: 2017-03-27

## 2017-03-27 PROBLEM — I26.99 PULMONARY EMBOLISM (HCC): Status: ACTIVE | Noted: 2017-03-27

## 2017-03-27 LAB
ANION GAP SERPL CALCULATED.3IONS-SCNC: 11 MMOL/L (ref 5–15)
BUN BLD-MCNC: 11 MG/DL (ref 7–21)
BUN/CREAT SERPL: 14.9 (ref 7–25)
CALCIUM SPEC-SCNC: 8.2 MG/DL (ref 8.4–10.2)
CHLORIDE SERPL-SCNC: 98 MMOL/L (ref 95–110)
CO2 SERPL-SCNC: 27 MMOL/L (ref 22–31)
CREAT BLD-MCNC: 0.74 MG/DL (ref 0.7–1.3)
GFR SERPL CREATININE-BSD FRML MDRD: 118 ML/MIN/1.73 (ref 60–162)
GLUCOSE BLD-MCNC: 165 MG/DL (ref 60–100)
GLUCOSE BLDC GLUCOMTR-MCNC: 161 MG/DL (ref 70–130)
GLUCOSE BLDC GLUCOMTR-MCNC: 168 MG/DL (ref 70–130)
GLUCOSE BLDC GLUCOMTR-MCNC: 181 MG/DL (ref 70–130)
GLUCOSE BLDC GLUCOMTR-MCNC: 190 MG/DL (ref 70–130)
POTASSIUM BLD-SCNC: 4.4 MMOL/L (ref 3.5–5.1)
SODIUM BLD-SCNC: 136 MMOL/L (ref 137–145)

## 2017-03-27 PROCEDURE — 82962 GLUCOSE BLOOD TEST: CPT

## 2017-03-27 PROCEDURE — 71275 CT ANGIOGRAPHY CHEST: CPT

## 2017-03-27 PROCEDURE — 63710000001 INSULIN ASPART PER 5 UNITS: Performed by: INTERNAL MEDICINE

## 2017-03-27 PROCEDURE — 25010000002 ENOXAPARIN PER 10 MG: Performed by: INTERNAL MEDICINE

## 2017-03-27 PROCEDURE — 25010000002 MORPHINE SULFATE (PF) 2 MG/ML SOLUTION: Performed by: INTERNAL MEDICINE

## 2017-03-27 PROCEDURE — 94799 UNLISTED PULMONARY SVC/PX: CPT

## 2017-03-27 PROCEDURE — 94760 N-INVAS EAR/PLS OXIMETRY 1: CPT

## 2017-03-27 PROCEDURE — 80048 BASIC METABOLIC PNL TOTAL CA: CPT | Performed by: FAMILY MEDICINE

## 2017-03-27 PROCEDURE — 0 IOPAMIDOL PER 1 ML: Performed by: INTERNAL MEDICINE

## 2017-03-27 RX ADMIN — AMLODIPINE BESYLATE 10 MG: 10 TABLET ORAL at 08:07

## 2017-03-27 RX ADMIN — PANTOPRAZOLE SODIUM 40 MG: 40 TABLET, DELAYED RELEASE ORAL at 08:10

## 2017-03-27 RX ADMIN — HYDRALAZINE HYDROCHLORIDE 25 MG: 25 TABLET, FILM COATED ORAL at 22:22

## 2017-03-27 RX ADMIN — MORPHINE SULFATE 1 MG: 2 INJECTION, SOLUTION INTRAMUSCULAR; INTRAVENOUS at 15:47

## 2017-03-27 RX ADMIN — IPRATROPIUM BROMIDE AND ALBUTEROL SULFATE 3 ML: 2.5; .5 SOLUTION RESPIRATORY (INHALATION) at 03:26

## 2017-03-27 RX ADMIN — INSULIN ASPART 2 UNITS: 100 INJECTION, SOLUTION INTRAVENOUS; SUBCUTANEOUS at 17:29

## 2017-03-27 RX ADMIN — PRASUGREL HYDROCHLORIDE 10 MG: 10 TABLET, FILM COATED ORAL at 08:10

## 2017-03-27 RX ADMIN — HYDRALAZINE HYDROCHLORIDE 25 MG: 25 TABLET, FILM COATED ORAL at 06:01

## 2017-03-27 RX ADMIN — RANOLAZINE 1000 MG: 500 TABLET, FILM COATED, EXTENDED RELEASE ORAL at 20:23

## 2017-03-27 RX ADMIN — Medication 3 ML: at 15:47

## 2017-03-27 RX ADMIN — BUDESONIDE AND FORMOTEROL FUMARATE DIHYDRATE 2 PUFF: 160; 4.5 AEROSOL RESPIRATORY (INHALATION) at 20:11

## 2017-03-27 RX ADMIN — MORPHINE SULFATE 1 MG: 2 INJECTION, SOLUTION INTRAMUSCULAR; INTRAVENOUS at 04:15

## 2017-03-27 RX ADMIN — ENOXAPARIN SODIUM 150 MG: 150 INJECTION SUBCUTANEOUS at 11:52

## 2017-03-27 RX ADMIN — PRAMIPEXOLE DIHYDROCHLORIDE 1 MG: 1 TABLET ORAL at 20:23

## 2017-03-27 RX ADMIN — PRAVASTATIN SODIUM 80 MG: 40 TABLET ORAL at 20:24

## 2017-03-27 RX ADMIN — RANOLAZINE 1000 MG: 500 TABLET, FILM COATED, EXTENDED RELEASE ORAL at 08:10

## 2017-03-27 RX ADMIN — BUDESONIDE AND FORMOTEROL FUMARATE DIHYDRATE 2 PUFF: 160; 4.5 AEROSOL RESPIRATORY (INHALATION) at 07:09

## 2017-03-27 RX ADMIN — INSULIN ASPART 2 UNITS: 100 INJECTION, SOLUTION INTRAVENOUS; SUBCUTANEOUS at 20:20

## 2017-03-27 RX ADMIN — INSULIN ASPART 2 UNITS: 100 INJECTION, SOLUTION INTRAVENOUS; SUBCUTANEOUS at 11:51

## 2017-03-27 RX ADMIN — HYDRALAZINE HYDROCHLORIDE 25 MG: 25 TABLET, FILM COATED ORAL at 15:43

## 2017-03-27 RX ADMIN — METOPROLOL SUCCINATE 100 MG: 100 TABLET, EXTENDED RELEASE ORAL at 08:09

## 2017-03-27 RX ADMIN — ISOSORBIDE MONONITRATE 120 MG: 60 TABLET, EXTENDED RELEASE ORAL at 08:08

## 2017-03-27 RX ADMIN — ENOXAPARIN SODIUM 150 MG: 150 INJECTION SUBCUTANEOUS at 22:30

## 2017-03-27 RX ADMIN — IPRATROPIUM BROMIDE AND ALBUTEROL SULFATE 3 ML: 2.5; .5 SOLUTION RESPIRATORY (INHALATION) at 07:01

## 2017-03-27 RX ADMIN — IOPAMIDOL 100 ML: 755 INJECTION, SOLUTION INTRAVENOUS at 10:30

## 2017-03-27 RX ADMIN — LISINOPRIL 20 MG: 20 TABLET ORAL at 08:09

## 2017-03-27 RX ADMIN — IPRATROPIUM BROMIDE AND ALBUTEROL SULFATE 3 ML: 2.5; .5 SOLUTION RESPIRATORY (INHALATION) at 20:09

## 2017-03-27 RX ADMIN — TRAZODONE HYDROCHLORIDE 300 MG: 150 TABLET ORAL at 20:24

## 2017-03-27 RX ADMIN — MORPHINE SULFATE 1 MG: 2 INJECTION, SOLUTION INTRAMUSCULAR; INTRAVENOUS at 22:22

## 2017-03-28 ENCOUNTER — APPOINTMENT (OUTPATIENT)
Dept: ULTRASOUND IMAGING | Facility: HOSPITAL | Age: 37
End: 2017-03-28

## 2017-03-28 LAB
ALBUMIN SERPL-MCNC: 3.9 G/DL (ref 3.4–4.8)
ALP SERPL-CCNC: 73 U/L (ref 38–126)
ALT SERPL W P-5'-P-CCNC: 36 U/L (ref 21–72)
ANION GAP SERPL CALCULATED.3IONS-SCNC: 12 MMOL/L (ref 5–15)
AST SERPL-CCNC: 29 U/L (ref 17–59)
BASOPHILS # BLD AUTO: 0.02 10*3/MM3 (ref 0–0.2)
BASOPHILS NFR BLD AUTO: 0.3 % (ref 0–2)
BILIRUB CONJ SERPL-MCNC: 0 MG/DL (ref 0–0.3)
BILIRUB INDIRECT SERPL-MCNC: 0.1 MG/DL (ref 0–1.1)
BILIRUB SERPL-MCNC: 0.4 MG/DL (ref 0.2–1.3)
BUN BLD-MCNC: 12 MG/DL (ref 7–21)
BUN/CREAT SERPL: 15.6 (ref 7–25)
CALCIUM SPEC-SCNC: 8.7 MG/DL (ref 8.4–10.2)
CHLORIDE SERPL-SCNC: 99 MMOL/L (ref 95–110)
CO2 SERPL-SCNC: 26 MMOL/L (ref 22–31)
CREAT BLD-MCNC: 0.77 MG/DL (ref 0.7–1.3)
DEPRECATED RDW RBC AUTO: 44.1 FL (ref 35.1–43.9)
EOSINOPHIL # BLD AUTO: 0.28 10*3/MM3 (ref 0–0.7)
EOSINOPHIL NFR BLD AUTO: 3.9 % (ref 0–7)
ERYTHROCYTE [DISTWIDTH] IN BLOOD BY AUTOMATED COUNT: 14.9 % (ref 11.5–14.5)
GFR SERPL CREATININE-BSD FRML MDRD: 113 ML/MIN/1.73 (ref 70–162)
GLUCOSE BLD-MCNC: 191 MG/DL (ref 60–100)
GLUCOSE BLDC GLUCOMTR-MCNC: 172 MG/DL (ref 70–130)
GLUCOSE BLDC GLUCOMTR-MCNC: 188 MG/DL (ref 70–130)
GLUCOSE BLDC GLUCOMTR-MCNC: 206 MG/DL (ref 70–130)
GLUCOSE BLDC GLUCOMTR-MCNC: 221 MG/DL (ref 70–130)
HCT VFR BLD AUTO: 37.6 % (ref 39–49)
HGB BLD-MCNC: 12.5 G/DL (ref 13.7–17.3)
IMM GRANULOCYTES # BLD: 0.09 10*3/MM3 (ref 0–0.02)
IMM GRANULOCYTES NFR BLD: 1.2 % (ref 0–0.5)
INR PPP: 0.98 (ref 0.8–1.2)
LYMPHOCYTES # BLD AUTO: 1.57 10*3/MM3 (ref 0.6–4.2)
LYMPHOCYTES NFR BLD AUTO: 21.7 % (ref 10–50)
MAGNESIUM SERPL-MCNC: 2 MG/DL (ref 1.6–2.3)
MCH RBC QN AUTO: 27.2 PG (ref 26.5–34)
MCHC RBC AUTO-ENTMCNC: 33.2 G/DL (ref 31.5–36.3)
MCV RBC AUTO: 81.7 FL (ref 80–98)
MONOCYTES # BLD AUTO: 0.71 10*3/MM3 (ref 0–0.9)
MONOCYTES NFR BLD AUTO: 9.8 % (ref 0–12)
NEUTROPHILS # BLD AUTO: 4.57 10*3/MM3 (ref 2–8.6)
NEUTROPHILS NFR BLD AUTO: 63.1 % (ref 37–80)
NRBC BLD MANUAL-RTO: 0 /100 WBC (ref 0–0)
PLATELET # BLD AUTO: 188 10*3/MM3 (ref 150–450)
PMV BLD AUTO: 8.9 FL (ref 8–12)
POTASSIUM BLD-SCNC: 4.1 MMOL/L (ref 3.5–5.1)
PROT SERPL-MCNC: 7.2 G/DL (ref 6.3–8.6)
PROTHROMBIN TIME: 12.9 SECONDS (ref 11.1–15.3)
RBC # BLD AUTO: 4.6 10*6/MM3 (ref 4.37–5.74)
SODIUM BLD-SCNC: 137 MMOL/L (ref 137–145)
WBC NRBC COR # BLD: 7.24 10*3/MM3 (ref 3.2–9.8)

## 2017-03-28 PROCEDURE — 99232 SBSQ HOSP IP/OBS MODERATE 35: CPT | Performed by: INTERNAL MEDICINE

## 2017-03-28 PROCEDURE — 85025 COMPLETE CBC W/AUTO DIFF WBC: CPT | Performed by: INTERNAL MEDICINE

## 2017-03-28 PROCEDURE — 85610 PROTHROMBIN TIME: CPT | Performed by: INTERNAL MEDICINE

## 2017-03-28 PROCEDURE — 25010000002 ENOXAPARIN PER 10 MG: Performed by: INTERNAL MEDICINE

## 2017-03-28 PROCEDURE — 93880 EXTRACRANIAL BILAT STUDY: CPT

## 2017-03-28 PROCEDURE — 99221 1ST HOSP IP/OBS SF/LOW 40: CPT | Performed by: NURSE PRACTITIONER

## 2017-03-28 PROCEDURE — 82962 GLUCOSE BLOOD TEST: CPT

## 2017-03-28 PROCEDURE — 80076 HEPATIC FUNCTION PANEL: CPT | Performed by: INTERNAL MEDICINE

## 2017-03-28 PROCEDURE — 93970 EXTREMITY STUDY: CPT

## 2017-03-28 PROCEDURE — 83735 ASSAY OF MAGNESIUM: CPT | Performed by: INTERNAL MEDICINE

## 2017-03-28 PROCEDURE — 63710000001 INSULIN ASPART PER 5 UNITS: Performed by: INTERNAL MEDICINE

## 2017-03-28 PROCEDURE — 94799 UNLISTED PULMONARY SVC/PX: CPT

## 2017-03-28 PROCEDURE — 80048 BASIC METABOLIC PNL TOTAL CA: CPT | Performed by: INTERNAL MEDICINE

## 2017-03-28 RX ORDER — WARFARIN SODIUM 7.5 MG/1
7.5 TABLET ORAL
Status: COMPLETED | OUTPATIENT
Start: 2017-03-28 | End: 2017-03-28

## 2017-03-28 RX ORDER — ACETAMINOPHEN 325 MG/1
650 TABLET ORAL EVERY 6 HOURS PRN
Status: DISCONTINUED | OUTPATIENT
Start: 2017-03-28 | End: 2017-03-29 | Stop reason: HOSPADM

## 2017-03-28 RX ADMIN — PRASUGREL HYDROCHLORIDE 10 MG: 10 TABLET, FILM COATED ORAL at 08:14

## 2017-03-28 RX ADMIN — ENOXAPARIN SODIUM 150 MG: 150 INJECTION SUBCUTANEOUS at 22:34

## 2017-03-28 RX ADMIN — LISINOPRIL 20 MG: 20 TABLET ORAL at 08:13

## 2017-03-28 RX ADMIN — RANOLAZINE 1000 MG: 500 TABLET, FILM COATED, EXTENDED RELEASE ORAL at 21:16

## 2017-03-28 RX ADMIN — IPRATROPIUM BROMIDE AND ALBUTEROL SULFATE 3 ML: 2.5; .5 SOLUTION RESPIRATORY (INHALATION) at 18:41

## 2017-03-28 RX ADMIN — INSULIN ASPART 2 UNITS: 100 INJECTION, SOLUTION INTRAVENOUS; SUBCUTANEOUS at 16:33

## 2017-03-28 RX ADMIN — HYDRALAZINE HYDROCHLORIDE 25 MG: 25 TABLET, FILM COATED ORAL at 13:21

## 2017-03-28 RX ADMIN — HYDRALAZINE HYDROCHLORIDE 25 MG: 25 TABLET, FILM COATED ORAL at 21:17

## 2017-03-28 RX ADMIN — PRAVASTATIN SODIUM 80 MG: 40 TABLET ORAL at 21:17

## 2017-03-28 RX ADMIN — TRAZODONE HYDROCHLORIDE 300 MG: 150 TABLET ORAL at 21:17

## 2017-03-28 RX ADMIN — ACETAMINOPHEN 650 MG: 325 TABLET ORAL at 22:34

## 2017-03-28 RX ADMIN — METOPROLOL SUCCINATE 100 MG: 100 TABLET, EXTENDED RELEASE ORAL at 08:13

## 2017-03-28 RX ADMIN — INSULIN ASPART 2 UNITS: 100 INJECTION, SOLUTION INTRAVENOUS; SUBCUTANEOUS at 11:37

## 2017-03-28 RX ADMIN — INSULIN ASPART 4 UNITS: 100 INJECTION, SOLUTION INTRAVENOUS; SUBCUTANEOUS at 05:57

## 2017-03-28 RX ADMIN — BUDESONIDE AND FORMOTEROL FUMARATE DIHYDRATE 2 PUFF: 160; 4.5 AEROSOL RESPIRATORY (INHALATION) at 18:43

## 2017-03-28 RX ADMIN — BUDESONIDE AND FORMOTEROL FUMARATE DIHYDRATE 2 PUFF: 160; 4.5 AEROSOL RESPIRATORY (INHALATION) at 11:18

## 2017-03-28 RX ADMIN — ENOXAPARIN SODIUM 150 MG: 150 INJECTION SUBCUTANEOUS at 11:36

## 2017-03-28 RX ADMIN — PRAMIPEXOLE DIHYDROCHLORIDE 1 MG: 1 TABLET ORAL at 21:17

## 2017-03-28 RX ADMIN — HYDRALAZINE HYDROCHLORIDE 25 MG: 25 TABLET, FILM COATED ORAL at 05:57

## 2017-03-28 RX ADMIN — RANOLAZINE 1000 MG: 500 TABLET, FILM COATED, EXTENDED RELEASE ORAL at 08:14

## 2017-03-28 RX ADMIN — ISOSORBIDE MONONITRATE 120 MG: 60 TABLET, EXTENDED RELEASE ORAL at 08:13

## 2017-03-28 RX ADMIN — INSULIN ASPART 4 UNITS: 100 INJECTION, SOLUTION INTRAVENOUS; SUBCUTANEOUS at 21:16

## 2017-03-28 RX ADMIN — PANTOPRAZOLE SODIUM 40 MG: 40 TABLET, DELAYED RELEASE ORAL at 08:13

## 2017-03-28 RX ADMIN — AMLODIPINE BESYLATE 10 MG: 10 TABLET ORAL at 08:13

## 2017-03-28 RX ADMIN — WARFARIN SODIUM 7.5 MG: 7.5 TABLET ORAL at 16:31

## 2017-03-29 VITALS
WEIGHT: 315 LBS | TEMPERATURE: 97.7 F | DIASTOLIC BLOOD PRESSURE: 64 MMHG | OXYGEN SATURATION: 96 % | RESPIRATION RATE: 16 BRPM | SYSTOLIC BLOOD PRESSURE: 149 MMHG | HEIGHT: 71 IN | BODY MASS INDEX: 44.1 KG/M2 | HEART RATE: 76 BPM

## 2017-03-29 LAB
ALBUMIN SERPL-MCNC: 3.7 G/DL (ref 3.4–4.8)
ALP SERPL-CCNC: 80 U/L (ref 38–126)
ALT SERPL W P-5'-P-CCNC: 36 U/L (ref 21–72)
ANION GAP SERPL CALCULATED.3IONS-SCNC: 12 MMOL/L (ref 5–15)
AST SERPL-CCNC: 31 U/L (ref 17–59)
BASOPHILS # BLD AUTO: 0.01 10*3/MM3 (ref 0–0.2)
BASOPHILS NFR BLD AUTO: 0.1 % (ref 0–2)
BILIRUB CONJ SERPL-MCNC: 0 MG/DL (ref 0–0.3)
BILIRUB INDIRECT SERPL-MCNC: 0 MG/DL (ref 0–1.1)
BILIRUB SERPL-MCNC: 0.4 MG/DL (ref 0.2–1.3)
BUN BLD-MCNC: 11 MG/DL (ref 7–21)
BUN/CREAT SERPL: 14.3 (ref 7–25)
CALCIUM SPEC-SCNC: 8.5 MG/DL (ref 8.4–10.2)
CHLORIDE SERPL-SCNC: 100 MMOL/L (ref 95–110)
CO2 SERPL-SCNC: 24 MMOL/L (ref 22–31)
CREAT BLD-MCNC: 0.77 MG/DL (ref 0.7–1.3)
DEPRECATED RDW RBC AUTO: 43.8 FL (ref 35.1–43.9)
EOSINOPHIL # BLD AUTO: 0.25 10*3/MM3 (ref 0–0.7)
EOSINOPHIL NFR BLD AUTO: 3.6 % (ref 0–7)
ERYTHROCYTE [DISTWIDTH] IN BLOOD BY AUTOMATED COUNT: 14.7 % (ref 11.5–14.5)
GFR SERPL CREATININE-BSD FRML MDRD: 113 ML/MIN/1.73 (ref 70–162)
GLUCOSE BLD-MCNC: 267 MG/DL (ref 60–100)
GLUCOSE BLDC GLUCOMTR-MCNC: 254 MG/DL (ref 70–130)
GLUCOSE BLDC GLUCOMTR-MCNC: 262 MG/DL (ref 70–130)
HCT VFR BLD AUTO: 36.8 % (ref 39–49)
HGB BLD-MCNC: 12.3 G/DL (ref 13.7–17.3)
IMM GRANULOCYTES # BLD: 0.08 10*3/MM3 (ref 0–0.02)
IMM GRANULOCYTES NFR BLD: 1.1 % (ref 0–0.5)
INR PPP: 1 (ref 0.8–1.2)
LYMPHOCYTES # BLD AUTO: 1.53 10*3/MM3 (ref 0.6–4.2)
LYMPHOCYTES NFR BLD AUTO: 21.7 % (ref 10–50)
MAGNESIUM SERPL-MCNC: 1.8 MG/DL (ref 1.6–2.3)
MCH RBC QN AUTO: 27.4 PG (ref 26.5–34)
MCHC RBC AUTO-ENTMCNC: 33.4 G/DL (ref 31.5–36.3)
MCV RBC AUTO: 82 FL (ref 80–98)
MONOCYTES # BLD AUTO: 0.58 10*3/MM3 (ref 0–0.9)
MONOCYTES NFR BLD AUTO: 8.2 % (ref 0–12)
NEUTROPHILS # BLD AUTO: 4.59 10*3/MM3 (ref 2–8.6)
NEUTROPHILS NFR BLD AUTO: 65.3 % (ref 37–80)
PLATELET # BLD AUTO: 186 10*3/MM3 (ref 150–450)
PMV BLD AUTO: 9.2 FL (ref 8–12)
POTASSIUM BLD-SCNC: 4 MMOL/L (ref 3.5–5.1)
PROT SERPL-MCNC: 7.1 G/DL (ref 6.3–8.6)
PROTHROMBIN TIME: 13.1 SECONDS (ref 11.1–15.3)
RBC # BLD AUTO: 4.49 10*6/MM3 (ref 4.37–5.74)
SODIUM BLD-SCNC: 136 MMOL/L (ref 137–145)
WBC NRBC COR # BLD: 7.04 10*3/MM3 (ref 3.2–9.8)

## 2017-03-29 PROCEDURE — 94799 UNLISTED PULMONARY SVC/PX: CPT

## 2017-03-29 PROCEDURE — 80076 HEPATIC FUNCTION PANEL: CPT | Performed by: INTERNAL MEDICINE

## 2017-03-29 PROCEDURE — 94760 N-INVAS EAR/PLS OXIMETRY 1: CPT

## 2017-03-29 PROCEDURE — 63710000001 INSULIN ASPART PER 5 UNITS: Performed by: INTERNAL MEDICINE

## 2017-03-29 PROCEDURE — 81241 F5 GENE: CPT | Performed by: NURSE PRACTITIONER

## 2017-03-29 PROCEDURE — 25010000002 ENOXAPARIN PER 10 MG: Performed by: INTERNAL MEDICINE

## 2017-03-29 PROCEDURE — 85025 COMPLETE CBC W/AUTO DIFF WBC: CPT | Performed by: INTERNAL MEDICINE

## 2017-03-29 PROCEDURE — 80048 BASIC METABOLIC PNL TOTAL CA: CPT | Performed by: INTERNAL MEDICINE

## 2017-03-29 PROCEDURE — 83735 ASSAY OF MAGNESIUM: CPT | Performed by: INTERNAL MEDICINE

## 2017-03-29 PROCEDURE — 85610 PROTHROMBIN TIME: CPT | Performed by: INTERNAL MEDICINE

## 2017-03-29 PROCEDURE — 82962 GLUCOSE BLOOD TEST: CPT

## 2017-03-29 PROCEDURE — 81240 F2 GENE: CPT | Performed by: NURSE PRACTITIONER

## 2017-03-29 PROCEDURE — 83090 ASSAY OF HOMOCYSTEINE: CPT | Performed by: NURSE PRACTITIONER

## 2017-03-29 RX ORDER — WARFARIN SODIUM 5 MG/1
5 TABLET ORAL
Qty: 7 TABLET | Refills: 0 | Status: SHIPPED | OUTPATIENT
Start: 2017-03-29 | End: 2017-04-03

## 2017-03-29 RX ADMIN — BUDESONIDE AND FORMOTEROL FUMARATE DIHYDRATE 2 PUFF: 160; 4.5 AEROSOL RESPIRATORY (INHALATION) at 07:02

## 2017-03-29 RX ADMIN — IPRATROPIUM BROMIDE AND ALBUTEROL SULFATE 3 ML: 2.5; .5 SOLUTION RESPIRATORY (INHALATION) at 06:56

## 2017-03-29 RX ADMIN — INSULIN ASPART 6 UNITS: 100 INJECTION, SOLUTION INTRAVENOUS; SUBCUTANEOUS at 05:52

## 2017-03-29 RX ADMIN — RANOLAZINE 1000 MG: 500 TABLET, FILM COATED, EXTENDED RELEASE ORAL at 09:13

## 2017-03-29 RX ADMIN — HYDRALAZINE HYDROCHLORIDE 25 MG: 25 TABLET, FILM COATED ORAL at 06:34

## 2017-03-29 RX ADMIN — ISOSORBIDE MONONITRATE 120 MG: 60 TABLET, EXTENDED RELEASE ORAL at 09:13

## 2017-03-29 RX ADMIN — PRASUGREL HYDROCHLORIDE 10 MG: 10 TABLET, FILM COATED ORAL at 09:12

## 2017-03-29 RX ADMIN — PANTOPRAZOLE SODIUM 40 MG: 40 TABLET, DELAYED RELEASE ORAL at 09:13

## 2017-03-29 RX ADMIN — ACETAMINOPHEN 650 MG: 325 TABLET ORAL at 05:52

## 2017-03-29 RX ADMIN — METOPROLOL SUCCINATE 100 MG: 100 TABLET, EXTENDED RELEASE ORAL at 09:14

## 2017-03-29 RX ADMIN — AMLODIPINE BESYLATE 10 MG: 10 TABLET ORAL at 09:13

## 2017-03-29 RX ADMIN — IPRATROPIUM BROMIDE AND ALBUTEROL SULFATE 3 ML: 2.5; .5 SOLUTION RESPIRATORY (INHALATION) at 09:43

## 2017-03-29 RX ADMIN — LISINOPRIL 20 MG: 20 TABLET ORAL at 09:14

## 2017-03-29 RX ADMIN — ENOXAPARIN SODIUM 150 MG: 150 INJECTION SUBCUTANEOUS at 10:14

## 2017-03-30 LAB — HCYS SERPL-SCNC: 7.6 UMOL/L (ref 0–15)

## 2017-04-03 ENCOUNTER — OFFICE VISIT (OUTPATIENT)
Dept: CARDIAC SURGERY | Facility: CLINIC | Age: 37
End: 2017-04-03

## 2017-04-03 VITALS
WEIGHT: 315 LBS | BODY MASS INDEX: 44.1 KG/M2 | OXYGEN SATURATION: 96 % | TEMPERATURE: 97.7 F | HEART RATE: 89 BPM | HEIGHT: 71 IN | SYSTOLIC BLOOD PRESSURE: 99 MMHG | DIASTOLIC BLOOD PRESSURE: 56 MMHG

## 2017-04-03 DIAGNOSIS — I26.99 OTHER ACUTE PULMONARY EMBOLISM WITHOUT ACUTE COR PULMONALE (HCC): Primary | ICD-10-CM

## 2017-04-03 DIAGNOSIS — Z71.89 ENCOUNTER FOR ANTICOAGULATION DISCUSSION AND COUNSELING: ICD-10-CM

## 2017-04-03 PROCEDURE — 99214 OFFICE O/P EST MOD 30 MIN: CPT | Performed by: NURSE PRACTITIONER

## 2017-04-03 RX ORDER — WARFARIN SODIUM 5 MG/1
5 TABLET ORAL DAILY
Qty: 60 TABLET | Refills: 1 | Status: SHIPPED | OUTPATIENT
Start: 2017-04-03 | End: 2017-04-19

## 2017-04-03 NOTE — PATIENT INSTRUCTIONS
Take 1 1/2 tablets tonight and tomorrow night.  Then return Wednesday at 230 for fingerstick blood check.    May have 1 pickle per day  Notify provider if you experience excessive bleeding from the nose, cuts, gums, rectum, urinary tract. Reddish or brown urine or stool. Vomiting of blood or hemorrhoidal bleeding. If major injury occurs present to the Emergency Department.     Wednesday 4/5

## 2017-04-04 LAB
F2 GENE MUT ANL BLD/T: ABNORMAL
F5 GENE MUT ANL BLD/T: NORMAL
LABORATORY COMMENT REPORT: NORMAL
Lab: ABNORMAL

## 2017-04-04 NOTE — PROGRESS NOTES
Subjective   Patient ID: Yordy Hansen is a 36 y.o. male is here today for follow-up for admission into coumadin clinic.  DINAH Gonzalez MD  Card Kaity VIDAL   History of Present Illness  History of Present Illness: 36 y.o. male with known CAD SP DUE stent X2 to LAD in June 2016 for which he continues on EFFIENT which he has tolerated well, he stopped smoking then. He is morbid obesity. He sttes he is not sedentary, and has no prolonged sitting. He present to ED with his symptoms; CTA pulmonary demonstrated RLL X 2 partial filling defects Echo: EF 50% Mild MR/TR No RV strain noted. Lower extremity Duplex: No DVT. CVTS saw in hospital for anticoagulation recommendations.   He has been taking 5 mg coumadin nightly.  Dosing schedule 7.5 mg 3/28, 5 mg 3/29-4/2 with Lovenox.   He denies any bleeding or unexplained bruising.  No other associated symptoms or modifying factors.     The following portions of the patient's history were reviewed and updated as appropriate: allergies, current medications, past family history, past medical history, past social history, past surgical history and problem list.    Current Outpatient Prescriptions:   •  albuterol (PROVENTIL HFA;VENTOLIN HFA) 108 (90 BASE) MCG/ACT inhaler, Inhale 2 puffs as needed for wheezing., Disp: , Rfl:   •  amLODIPine (NORVASC) 10 MG tablet, Take 10 mg by mouth daily., Disp: , Rfl:   •  aspirin 81 MG tablet, Take 81 mg by mouth Daily., Disp: , Rfl:   •  Canagliflozin (INVOKANA) 100 MG tablet, Take 1 tablet by mouth daily., Disp: , Rfl:   •  enoxaparin (LOVENOX) 150 MG/ML injection, Inject 1 mL under the skin Every 12 (Twelve) Hours., Disp: 20 mL, Rfl: 0  •  FENOFIBRATE MICRONIZED PO, Take 1 tablet by mouth every night., Disp: , Rfl:   •  ipratropium-albuterol (DUO-NEB) 0.5-2.5 mg/mL nebulizer, Inhale 3 mL Every 4 (Four) Hours As Needed., Disp: , Rfl:   •  isosorbide mononitrate (IMDUR) 60 MG 24 hr tablet, Take 120 mg by mouth Daily., Disp: , Rfl:   •   Liraglutide (VICTOZA) 18 MG/3ML solution pen-injector, Inject  under the skin., Disp: , Rfl:   •  lisinopril (PRINIVIL,ZESTRIL) 20 MG tablet, Take 20 mg by mouth daily., Disp: , Rfl:   •  lisinopril-hydrochlorothiazide (PRINZIDE,ZESTORETIC) 20-25 MG per tablet, Take 1 tablet by mouth., Disp: , Rfl:   •  metFORMIN (GLUCOPHAGE) 1000 MG tablet, Take 1,000 mg by mouth 2 (Two) Times a Day With Meals., Disp: , Rfl:   •  metoprolol succinate XL (TOPROL-XL) 100 MG 24 hr tablet, Take 1 tablet by mouth Daily., Disp: 30 tablet, Rfl: 1  •  nitroglycerin (NITROSTAT) 0.4 MG SL tablet, Place 1 tablet under the tongue Every 5 (Five) Minutes As Needed for chest pain., Disp: 25 tablet, Rfl: 6  •  ondansetron ODT (ZOFRAN-ODT) 4 MG disintegrating tablet, Take 1 tablet by mouth Every 6 (Six) Hours As Needed for Nausea or Vomiting., Disp: 12 tablet, Rfl: 0  •  pantoprazole (PROTONIX) 40 MG EC tablet, Take 40 mg by mouth Daily., Disp: , Rfl:   •  pramipexole (MIRAPEX) 1 MG tablet, Take 1 mg by mouth Every Night. Taking 2mg daily, Disp: , Rfl:   •  prasugrel (EFFIENT) 10 MG tablet, Take 1 tablet by mouth Daily., Disp: 30 tablet, Rfl: 1  •  pravastatin (PRAVACHOL) 40 MG tablet, Take 80 mg by mouth Every Night., Disp: , Rfl:   •  ranolazine (RANEXA) 1000 MG 12 hr tablet, Take 1 tablet by mouth Every 12 (Twelve) Hours., Disp: 60 tablet, Rfl: 1  •  traZODone (DESYREL) 300 MG tablet, Take 300 mg by mouth., Disp: , Rfl:   •  MICROLET LANCETS misc, USE TO TEST BLOOD SUGAR EVERY DAY AND AS NEEDED, Disp: , Rfl:   •  warfarin (COUMADIN) 5 MG tablet, Take 1 tablet by mouth Daily. Monday 4/3 Tuesday 4/4 take 1.5 tablets, Disp: 60 tablet, Rfl: 1    ROS  Constitutional: Negative for activity change, appetite change, chills, fatigue and fever.   Denies use of power tools, electric knives, chain saws, or conttact sports. Shaves with safety razor. Aware to use soft tooth brush and waxed floss.  No recent falls.  Denies: GIB, GUB, or Seizures.   HENT:  Negative for congestion, nosebleeds, sore throat and voice change.   Eyes: Negative for visual disturbance.   Respiratory: Positive for chest tightness. Negative for cough, choking, shortness of breath, wheezing and stridor.   Cardiovascular: Positive for chest pain (Improved ). Negative for palpitations and leg swelling.   Gastrointestinal: Negative for abdominal pain, blood in stool, constipation, diarrhea and nausea.   Genitourinary: Negative for hematuria.   Musculoskeletal: Negative for back pain and joint swelling.   Skin: Negative for color change, pallor and rash.   Allergic/Immunologic: Negative for environmental allergies.   Neurological: Negative for dizziness, seizures, syncope, weakness, light-headedness, numbness and headaches.   Hematological: Does not bruise/bleed easily.   Psychiatric/Behavioral: The patient is not nervous/anxious.         Objective   Physical Exam  Constitutional: He is oriented to person, place, and time. He appears morbidly obese.  HENT:   Head: Normocephalic.   Mouth/Throat: Oropharynx is clear and moist.   Eyes: Conjunctivae are normal. Pupils are equal, round, and reactive to light.   Neck: No JVD present. No tracheal deviation present.   Cardiovascular: Normal rate, regular rhythm, Distant normal heart sounds and intact distal pulses.   Pulses:  Carotid pulses are 1+ on the right side with bruit, and 1+ on the left side.  Radial pulses are 2+ on the right side, and 2+ on the left side.   Dorsalis pedis pulses are 2+ on the right side, and 2+ on the left side.   Posterior tibial pulses are 2+ on the right side, and 2+ on the left side.   Pulmonary/Chest: Effort normal and breath sounds normal. No stridor.   Distant  Abdominal: Soft. Bowel sounds are normal. He exhibits no distension. There is no tenderness.   Musculoskeletal: He exhibits no edema or tenderness.   Neurological: He is alert and oriented to person, place, and time. No cranial nerve deficit.   Skin: Skin is warm  and dry. No rash noted. No erythema. No pallor.   Psychiatric: Judgment normal.   Nursing note and vitals reviewed.         Assessment/Plan   Independent Review of Radiographic Studies:    No new studies   1. Other acute pulmonary embolism without acute cor pulmonale  Anticoagulation indicated for 6 months.  Genetic hypercoagable workup completed in hospital Results pending.    2. Encounter for anticoagulation discussion and counseling  Patient Education:  Prescription risks and side effects discussed, Should issues or concerns arise regarding anticoagulation therapy, please contact our services regarding further instructions,, or present to an urgent/emergent care facility for further evaluation  Patient Education Materials provided to pt (anticoag booklet).      Pt. stated willingness to be compliant and understanding of life             style restrictions and  medication dosing.  No other safety concerns at  this time.  Coumadin dosing schedule:  Take 1 1/2 tablets (7.5 mg) tonight and tomorrow night.  Then return Wednesday at 230 for fingerstick blood check.    May have 1 pickle per day  Notify provider if you experience excessive bleeding from the nose, cuts, gums, rectum, urinary tract. Reddish or brown urine or stool. Vomiting of blood or hemorrhoidal bleeding. If major injury occurs present to the Emergency Department.     Wednesday 4/5

## 2017-04-05 ENCOUNTER — ANTICOAGULATION VISIT (OUTPATIENT)
Dept: CARDIAC SURGERY | Facility: CLINIC | Age: 37
End: 2017-04-05

## 2017-04-05 VITALS — DIASTOLIC BLOOD PRESSURE: 64 MMHG | HEART RATE: 109 BPM | SYSTOLIC BLOOD PRESSURE: 118 MMHG

## 2017-04-05 DIAGNOSIS — I26.99 OTHER ACUTE PULMONARY EMBOLISM WITHOUT ACUTE COR PULMONALE (HCC): ICD-10-CM

## 2017-04-05 DIAGNOSIS — Z79.01 LONG-TERM (CURRENT) USE OF ANTICOAGULANTS: ICD-10-CM

## 2017-04-05 LAB — INR PPP: 1.1 (ref 0.9–1.1)

## 2017-04-05 PROCEDURE — 99211 OFF/OP EST MAY X REQ PHY/QHP: CPT | Performed by: NURSE PRACTITIONER

## 2017-04-05 PROCEDURE — 85610 PROTHROMBIN TIME: CPT | Performed by: NURSE PRACTITIONER

## 2017-04-05 NOTE — PROGRESS NOTES
Pt denies med changes or bleeding problems. Pt was instructed to avoid green veggies, continue Lovenox bid, and take 10 mg of Coumadin tonight and tomorrow night; pt verbalized. Patient instructed regarding medication; results given and questions answered. Nutritional counseling given.  Dietary factors affecting therapy addressed.  Patient instructed to monitor for excessive bruising or bleeding. Will recheck in 2 days.  Electronically signed by LALA Case

## 2017-04-06 ENCOUNTER — DOCUMENTATION (OUTPATIENT)
Dept: CARDIAC SURGERY | Facility: CLINIC | Age: 37
End: 2017-04-06

## 2017-04-06 NOTE — PROGRESS NOTES
Pt called and states he is having pressure in his chest and mild shortness of breath. Pt is known to have a PE but was instructed to go to the ER to rule out other causes; pt verbalized an understanding.

## 2017-04-07 ENCOUNTER — ANTICOAGULATION VISIT (OUTPATIENT)
Dept: CARDIAC SURGERY | Facility: CLINIC | Age: 37
End: 2017-04-07

## 2017-04-07 ENCOUNTER — APPOINTMENT (OUTPATIENT)
Dept: GENERAL RADIOLOGY | Facility: HOSPITAL | Age: 37
End: 2017-04-07

## 2017-04-07 ENCOUNTER — HOSPITAL ENCOUNTER (OUTPATIENT)
Facility: HOSPITAL | Age: 37
Setting detail: OBSERVATION
Discharge: HOME | End: 2017-04-08
Attending: FAMILY MEDICINE | Admitting: INTERNAL MEDICINE

## 2017-04-07 VITALS — DIASTOLIC BLOOD PRESSURE: 76 MMHG | HEART RATE: 84 BPM | OXYGEN SATURATION: 98 % | SYSTOLIC BLOOD PRESSURE: 126 MMHG

## 2017-04-07 DIAGNOSIS — I26.99 OTHER ACUTE PULMONARY EMBOLISM WITHOUT ACUTE COR PULMONALE (HCC): ICD-10-CM

## 2017-04-07 DIAGNOSIS — R07.2 PRECORDIAL PAIN: Primary | ICD-10-CM

## 2017-04-07 DIAGNOSIS — Z79.01 LONG-TERM (CURRENT) USE OF ANTICOAGULANTS: ICD-10-CM

## 2017-04-07 LAB
ALBUMIN SERPL-MCNC: 4.1 G/DL (ref 3.4–4.8)
ALBUMIN/GLOB SERPL: 1.1 G/DL (ref 1.1–1.8)
ALP SERPL-CCNC: 73 U/L (ref 38–126)
ALT SERPL W P-5'-P-CCNC: 54 U/L (ref 21–72)
ANION GAP SERPL CALCULATED.3IONS-SCNC: 12 MMOL/L (ref 5–15)
APTT PPP: 42.6 SECONDS (ref 20–40.3)
AST SERPL-CCNC: 51 U/L (ref 17–59)
BASOPHILS # BLD AUTO: 0.02 10*3/MM3 (ref 0–0.2)
BASOPHILS # BLD AUTO: 0.02 10*3/MM3 (ref 0–0.2)
BASOPHILS NFR BLD AUTO: 0.2 % (ref 0–2)
BASOPHILS NFR BLD AUTO: 0.3 % (ref 0–2)
BILIRUB SERPL-MCNC: 0.4 MG/DL (ref 0.2–1.3)
BUN BLD-MCNC: 13 MG/DL (ref 7–21)
BUN/CREAT SERPL: 13 (ref 7–25)
CALCIUM SPEC-SCNC: 9.1 MG/DL (ref 8.4–10.2)
CHLORIDE SERPL-SCNC: 97 MMOL/L (ref 95–110)
CK MB SERPL-CCNC: 1.93 NG/ML (ref 0–5)
CK SERPL-CCNC: 237 U/L (ref 55–170)
CO2 SERPL-SCNC: 29 MMOL/L (ref 22–31)
CREAT BLD-MCNC: 1 MG/DL (ref 0.7–1.3)
DEPRECATED RDW RBC AUTO: 43.9 FL (ref 35.1–43.9)
DEPRECATED RDW RBC AUTO: 44.2 FL (ref 35.1–43.9)
EOSINOPHIL # BLD AUTO: 0.27 10*3/MM3 (ref 0–0.7)
EOSINOPHIL # BLD AUTO: 0.29 10*3/MM3 (ref 0–0.7)
EOSINOPHIL NFR BLD AUTO: 3.3 % (ref 0–7)
EOSINOPHIL NFR BLD AUTO: 4 % (ref 0–7)
ERYTHROCYTE [DISTWIDTH] IN BLOOD BY AUTOMATED COUNT: 14.9 % (ref 11.5–14.5)
ERYTHROCYTE [DISTWIDTH] IN BLOOD BY AUTOMATED COUNT: 14.9 % (ref 11.5–14.5)
GFR SERPL CREATININE-BSD FRML MDRD: 84 ML/MIN/1.73 (ref 60–162)
GLOBULIN UR ELPH-MCNC: 3.8 GM/DL (ref 2.3–3.5)
GLUCOSE BLD-MCNC: 117 MG/DL (ref 60–100)
GLUCOSE BLDC GLUCOMTR-MCNC: 99 MG/DL (ref 70–130)
HCT VFR BLD AUTO: 38.8 % (ref 39–49)
HCT VFR BLD AUTO: 39.1 % (ref 39–49)
HGB BLD-MCNC: 13.2 G/DL (ref 13.7–17.3)
HGB BLD-MCNC: 13.4 G/DL (ref 13.7–17.3)
HOLD SPECIMEN: NORMAL
HOLD SPECIMEN: NORMAL
IMM GRANULOCYTES # BLD: 0.09 10*3/MM3 (ref 0–0.02)
IMM GRANULOCYTES # BLD: 0.11 10*3/MM3 (ref 0–0.02)
IMM GRANULOCYTES NFR BLD: 1.1 % (ref 0–0.5)
IMM GRANULOCYTES NFR BLD: 1.5 % (ref 0–0.5)
INR PPP: 1.4 (ref 0.9–1.1)
INR PPP: 1.41 (ref 0.8–1.2)
LYMPHOCYTES # BLD AUTO: 1.5 10*3/MM3 (ref 0.6–4.2)
LYMPHOCYTES # BLD AUTO: 1.94 10*3/MM3 (ref 0.6–4.2)
LYMPHOCYTES NFR BLD AUTO: 18.2 % (ref 10–50)
LYMPHOCYTES NFR BLD AUTO: 26.8 % (ref 10–50)
MCH RBC QN AUTO: 27.6 PG (ref 26.5–34)
MCH RBC QN AUTO: 27.9 PG (ref 26.5–34)
MCHC RBC AUTO-ENTMCNC: 34 G/DL (ref 31.5–36.3)
MCHC RBC AUTO-ENTMCNC: 34.3 G/DL (ref 31.5–36.3)
MCV RBC AUTO: 81 FL (ref 80–98)
MCV RBC AUTO: 81.3 FL (ref 80–98)
MONOCYTES # BLD AUTO: 0.74 10*3/MM3 (ref 0–0.9)
MONOCYTES # BLD AUTO: 0.8 10*3/MM3 (ref 0–0.9)
MONOCYTES NFR BLD AUTO: 10.2 % (ref 0–12)
MONOCYTES NFR BLD AUTO: 9.7 % (ref 0–12)
NEUTROPHILS # BLD AUTO: 4.15 10*3/MM3 (ref 2–8.6)
NEUTROPHILS # BLD AUTO: 5.55 10*3/MM3 (ref 2–8.6)
NEUTROPHILS NFR BLD AUTO: 57.2 % (ref 37–80)
NEUTROPHILS NFR BLD AUTO: 67.5 % (ref 37–80)
NT-PROBNP SERPL-MCNC: 26.8 PG/ML (ref 0–450)
PLATELET # BLD AUTO: 192 10*3/MM3 (ref 150–450)
PLATELET # BLD AUTO: 213 10*3/MM3 (ref 150–450)
PMV BLD AUTO: 8.6 FL (ref 8–12)
PMV BLD AUTO: 8.7 FL (ref 8–12)
POTASSIUM BLD-SCNC: 3.9 MMOL/L (ref 3.5–5.1)
PROT SERPL-MCNC: 7.9 G/DL (ref 6.3–8.6)
PROTHROMBIN TIME: 17.2 SECONDS (ref 11.1–15.3)
RBC # BLD AUTO: 4.79 10*6/MM3 (ref 4.37–5.74)
RBC # BLD AUTO: 4.81 10*6/MM3 (ref 4.37–5.74)
SODIUM BLD-SCNC: 138 MMOL/L (ref 137–145)
TROPONIN I SERPL-MCNC: <0.012 NG/ML
TROPONIN I SERPL-MCNC: <0.012 NG/ML
WBC NRBC COR # BLD: 7.25 10*3/MM3 (ref 3.2–9.8)
WBC NRBC COR # BLD: 8.23 10*3/MM3 (ref 3.2–9.8)
WHOLE BLOOD HOLD SPECIMEN: NORMAL
WHOLE BLOOD HOLD SPECIMEN: NORMAL

## 2017-04-07 PROCEDURE — 83880 ASSAY OF NATRIURETIC PEPTIDE: CPT | Performed by: FAMILY MEDICINE

## 2017-04-07 PROCEDURE — G0378 HOSPITAL OBSERVATION PER HR: HCPCS

## 2017-04-07 PROCEDURE — 85025 COMPLETE CBC W/AUTO DIFF WBC: CPT | Performed by: FAMILY MEDICINE

## 2017-04-07 PROCEDURE — 84484 ASSAY OF TROPONIN QUANT: CPT | Performed by: FAMILY MEDICINE

## 2017-04-07 PROCEDURE — 82962 GLUCOSE BLOOD TEST: CPT

## 2017-04-07 PROCEDURE — 80053 COMPREHEN METABOLIC PANEL: CPT | Performed by: FAMILY MEDICINE

## 2017-04-07 PROCEDURE — 99284 EMERGENCY DEPT VISIT MOD MDM: CPT

## 2017-04-07 PROCEDURE — 99211 OFF/OP EST MAY X REQ PHY/QHP: CPT | Performed by: NURSE PRACTITIONER

## 2017-04-07 PROCEDURE — 93010 ELECTROCARDIOGRAM REPORT: CPT | Performed by: INTERNAL MEDICINE

## 2017-04-07 PROCEDURE — 93005 ELECTROCARDIOGRAM TRACING: CPT | Performed by: FAMILY MEDICINE

## 2017-04-07 PROCEDURE — 71020 HC CHEST PA AND LATERAL: CPT

## 2017-04-07 PROCEDURE — 25010000002 ENOXAPARIN PER 10 MG: Performed by: NURSE PRACTITIONER

## 2017-04-07 PROCEDURE — 85610 PROTHROMBIN TIME: CPT | Performed by: NURSE PRACTITIONER

## 2017-04-07 PROCEDURE — 85025 COMPLETE CBC W/AUTO DIFF WBC: CPT | Performed by: NURSE PRACTITIONER

## 2017-04-07 PROCEDURE — 82550 ASSAY OF CK (CPK): CPT | Performed by: FAMILY MEDICINE

## 2017-04-07 PROCEDURE — 85610 PROTHROMBIN TIME: CPT | Performed by: INTERNAL MEDICINE

## 2017-04-07 PROCEDURE — 85730 THROMBOPLASTIN TIME PARTIAL: CPT | Performed by: FAMILY MEDICINE

## 2017-04-07 PROCEDURE — 82553 CREATINE MB FRACTION: CPT | Performed by: FAMILY MEDICINE

## 2017-04-07 RX ORDER — ASPIRIN 81 MG/1
243 TABLET, CHEWABLE ORAL ONCE
Status: COMPLETED | OUTPATIENT
Start: 2017-04-07 | End: 2017-04-07

## 2017-04-07 RX ORDER — SODIUM CHLORIDE 0.9 % (FLUSH) 0.9 %
10 SYRINGE (ML) INJECTION AS NEEDED
Status: DISCONTINUED | OUTPATIENT
Start: 2017-04-07 | End: 2017-04-08 | Stop reason: HOSPADM

## 2017-04-07 RX ORDER — METOPROLOL SUCCINATE 100 MG/1
100 TABLET, EXTENDED RELEASE ORAL DAILY
Status: DISCONTINUED | OUTPATIENT
Start: 2017-04-07 | End: 2017-04-08 | Stop reason: HOSPADM

## 2017-04-07 RX ORDER — AMLODIPINE BESYLATE 10 MG/1
10 TABLET ORAL DAILY
Status: DISCONTINUED | OUTPATIENT
Start: 2017-04-07 | End: 2017-04-08 | Stop reason: HOSPADM

## 2017-04-07 RX ORDER — ACETAMINOPHEN 325 MG/1
650 TABLET ORAL EVERY 4 HOURS PRN
Status: DISCONTINUED | OUTPATIENT
Start: 2017-04-07 | End: 2017-04-08 | Stop reason: HOSPADM

## 2017-04-07 RX ORDER — PRAVASTATIN SODIUM 40 MG
80 TABLET ORAL NIGHTLY
Status: DISCONTINUED | OUTPATIENT
Start: 2017-04-07 | End: 2017-04-08 | Stop reason: HOSPADM

## 2017-04-07 RX ORDER — DEXTROSE MONOHYDRATE 25 G/50ML
25 INJECTION, SOLUTION INTRAVENOUS
Status: DISCONTINUED | OUTPATIENT
Start: 2017-04-07 | End: 2017-04-08 | Stop reason: HOSPADM

## 2017-04-07 RX ORDER — WARFARIN SODIUM 10 MG/1
10 TABLET ORAL
Status: DISCONTINUED | OUTPATIENT
Start: 2017-04-07 | End: 2017-04-08 | Stop reason: HOSPADM

## 2017-04-07 RX ORDER — TRAZODONE HYDROCHLORIDE 150 MG/1
300 TABLET ORAL NIGHTLY
Status: DISCONTINUED | OUTPATIENT
Start: 2017-04-07 | End: 2017-04-08 | Stop reason: HOSPADM

## 2017-04-07 RX ORDER — NICOTINE POLACRILEX 4 MG
15 LOZENGE BUCCAL
Status: DISCONTINUED | OUTPATIENT
Start: 2017-04-07 | End: 2017-04-08 | Stop reason: HOSPADM

## 2017-04-07 RX ORDER — ASPIRIN 325 MG
325 TABLET ORAL ONCE
Status: DISCONTINUED | OUTPATIENT
Start: 2017-04-07 | End: 2017-04-07

## 2017-04-07 RX ORDER — ISOSORBIDE MONONITRATE 60 MG/1
120 TABLET, EXTENDED RELEASE ORAL DAILY
Status: DISCONTINUED | OUTPATIENT
Start: 2017-04-07 | End: 2017-04-08 | Stop reason: HOSPADM

## 2017-04-07 RX ORDER — ALBUTEROL SULFATE 90 UG/1
2 AEROSOL, METERED RESPIRATORY (INHALATION) AS NEEDED
Status: DISCONTINUED | OUTPATIENT
Start: 2017-04-07 | End: 2017-04-07 | Stop reason: CLARIF

## 2017-04-07 RX ORDER — SODIUM CHLORIDE 0.9 % (FLUSH) 0.9 %
1-10 SYRINGE (ML) INJECTION AS NEEDED
Status: DISCONTINUED | OUTPATIENT
Start: 2017-04-07 | End: 2017-04-08 | Stop reason: HOSPADM

## 2017-04-07 RX ORDER — PANTOPRAZOLE SODIUM 40 MG/1
40 TABLET, DELAYED RELEASE ORAL DAILY
Status: DISCONTINUED | OUTPATIENT
Start: 2017-04-07 | End: 2017-04-08 | Stop reason: HOSPADM

## 2017-04-07 RX ORDER — ASPIRIN 81 MG/1
81 TABLET ORAL ONCE
Status: DISCONTINUED | OUTPATIENT
Start: 2017-04-07 | End: 2017-04-08 | Stop reason: HOSPADM

## 2017-04-07 RX ORDER — PRASUGREL 10 MG/1
10 TABLET, FILM COATED ORAL DAILY
Status: DISCONTINUED | OUTPATIENT
Start: 2017-04-07 | End: 2017-04-08 | Stop reason: HOSPADM

## 2017-04-07 RX ORDER — ONDANSETRON 4 MG/1
4 TABLET, ORALLY DISINTEGRATING ORAL EVERY 6 HOURS PRN
Status: DISCONTINUED | OUTPATIENT
Start: 2017-04-07 | End: 2017-04-08 | Stop reason: HOSPADM

## 2017-04-07 RX ORDER — LISINOPRIL 40 MG/1
40 TABLET ORAL DAILY
Status: DISCONTINUED | OUTPATIENT
Start: 2017-04-07 | End: 2017-04-08 | Stop reason: HOSPADM

## 2017-04-07 RX ORDER — ALBUTEROL SULFATE 2.5 MG/3ML
2.5 SOLUTION RESPIRATORY (INHALATION) EVERY 6 HOURS PRN
Status: DISCONTINUED | OUTPATIENT
Start: 2017-04-07 | End: 2017-04-08 | Stop reason: HOSPADM

## 2017-04-07 RX ORDER — RANOLAZINE 500 MG/1
1000 TABLET, EXTENDED RELEASE ORAL EVERY 12 HOURS SCHEDULED
Status: DISCONTINUED | OUTPATIENT
Start: 2017-04-07 | End: 2017-04-08 | Stop reason: HOSPADM

## 2017-04-07 RX ADMIN — WARFARIN SODIUM 10 MG: 10 TABLET ORAL at 21:47

## 2017-04-07 RX ADMIN — PRAVASTATIN SODIUM 80 MG: 40 TABLET ORAL at 21:47

## 2017-04-07 RX ADMIN — ASPIRIN 81 MG 243 MG: 81 TABLET ORAL at 17:00

## 2017-04-07 RX ADMIN — RANOLAZINE 1000 MG: 500 TABLET, FILM COATED, EXTENDED RELEASE ORAL at 21:47

## 2017-04-07 RX ADMIN — PANTOPRAZOLE SODIUM 40 MG: 40 TABLET, DELAYED RELEASE ORAL at 21:47

## 2017-04-07 RX ADMIN — TRAZODONE HYDROCHLORIDE 300 MG: 150 TABLET ORAL at 21:47

## 2017-04-07 RX ADMIN — Medication 10 ML: at 17:40

## 2017-04-07 RX ADMIN — ENOXAPARIN SODIUM 150 MG: 150 INJECTION SUBCUTANEOUS at 21:47

## 2017-04-07 NOTE — PROGRESS NOTES
"Pt denies med changes or bleeding problems. Pt was instructed to avoid green veggies, continue Lovenox 150 mg sq bid, and Coumadin dosing as on calendar; pt verbalized. Patient instructed regarding medication; results given and questions answered. Nutritional counseling given.  Dietary factors affecting therapy addressed.  Patient instructed to monitor for excessive bruising or bleeding. Will recheck in 3 days. Prescription called in [facility] [place] for Lovenox 150 mg sq bid #10 with 1 refill. Pt c/o chest pressure, shortness of breath with exertion, and nausea. Pt states \"feels like I have a 5 pound bag of sugar sitting on my chest\".  Pt was seen by LALA James who recommended pt be seen in ER due to chest discomfort. Ligia Mcbride MA transported pt to ER via wheelchair.  Electronically signed by LALA Case      "

## 2017-04-07 NOTE — H&P
[facility] Medicine Admission      Date of Admission: 4/7/2017      Primary Care Physician: LALA Read      Chief Complaint: Chest pain, Nausea    HPI: This is a 36 year old male with a significant medical history of CAD s/p stenting and PE on coumadin.  He was recently discharged with the new diagnosis of PE.  He has noted since discharge having intermittent chest pain and daily nausea.  In the ED his cardiac markers are negative and EKG is without any acute abnormalities.  No other known aggravating or alleviating factors.  No other associated symptoms.    Concurrent Medical History:  has a past medical history of Abdominal pain; Acute right otitis media; Allergic rhinitis; Asthma; Backache; Bronchitis; Cellulitis of lower leg; Chest pain; Chronic back pain; Chronic pain; Cough; Diarrhea; Dyspnea; GERD (gastroesophageal reflux disease); Headache; Hip pain; Hypertension; Insomnia; Low back pain; Lumbosacral strain; Morbid obesity; RLS (restless legs syndrome); and Seizures.    Past Surgical History:   Past Surgical History:   Procedure Laterality Date   • CARDIAC CATHETERIZATION      LAD stent   • CARDIAC CATHETERIZATION N/A 3/15/2017    Procedure: Left Heart Cath;  Surgeon: Edwige Briceno MD;  Location: [facility] INVASIVE LOCATION;  Service:    • CARDIAC CATHETERIZATION N/A 3/15/2017    Procedure: Stent SOPHIA coronary;  Surgeon: Edwige Briceno MD;  Location: [facility] CATH INVASIVE LOCATION;  Service:    • WA RT/LT HEART CATHETERS N/A 3/15/2017    Procedure: Percutaneous Coronary Intervention;  Surgeon: Edwige Briceno MD;  Location: [facility] INVASIVE LOCATION;  Service: Cardiovascular   • TRANSESOPHAGEAL ECHOCARDIOGRAM (MONICA)      With color flow       Family History:   Family History   Problem Relation Age of Onset   • Cancer Other    • Coronary artery disease Other    • Diabetes Other    • Heart disease Other    • Hypertension Other        Social History:   Social  History     Social History   • Marital status:      Spouse name: N/A   • Number of children: N/A   • Years of education: N/A     Occupational History   • Not on file.     Social History Main Topics   • Smoking status: Former Smoker   • Smokeless tobacco: Current User     Types: Snuff   • Alcohol use No   • Drug use: No   • Sexual activity: Not on file     Other Topics Concern   • Not on file     Social History Narrative       Allergies:   Allergies   Allergen Reactions   • Glipizide      Slurred speech     • Phenergan [Promethazine Hcl]      Burning veins   • Risperdal [Risperidone]      Slurred speech   • Tramadol      seizures       Medications:   Albuterol  Norvasc  ASA  Invokana  Lovenox  Fenofibrate  Duoneb  Imdur  Zestoretic  Metformin  Toprol XL  Zofran  Protonix  Mirapex  Effient  Pravachol  Ranexa  Desyrel  Coumadin    Review of Systems:  Review of Systems   Constitutional: Negative for chills, fatigue and fever.   Respiratory: Positive for chest tightness. Negative for cough, shortness of breath and wheezing.    Cardiovascular: Positive for chest pain. Negative for palpitations and leg swelling.   Gastrointestinal: Positive for nausea. Negative for abdominal pain, diarrhea and vomiting.   Musculoskeletal: Negative for back pain and neck pain.   Skin: Negative for rash and wound.   Neurological: Negative for dizziness, syncope, weakness, numbness and headaches.   All other systems reviewed and are negative.     Otherwise complete ROS is negative except as mentioned above.    Physical Exam:   Temp:  [97.7 °F (36.5 °C)] 97.7 °F (36.5 °C)  Heart Rate:  [74-84] 78  Resp:  [18-20] 18  BP: ()/(50-76) 136/70  Physical Exam   Constitutional: He is oriented to person, place, and time. He appears well-developed and well-nourished. No distress.   HENT:   Head: Normocephalic and atraumatic.   Mouth/Throat: Oropharynx is clear and moist.   Eyes: Conjunctivae and EOM are normal.   Neck: Normal range of  motion. Neck supple.   Cardiovascular: Normal rate, regular rhythm and intact distal pulses.  Exam reveals no gallop and no friction rub.    No murmur heard.  Pulmonary/Chest: Effort normal and breath sounds normal. No respiratory distress. He has no wheezes. He has no rales. He exhibits no tenderness.   Abdominal: Soft. Bowel sounds are normal. He exhibits no distension. There is no tenderness.   Musculoskeletal: Normal range of motion. He exhibits no edema or deformity.   Neurological: He is alert and oriented to person, place, and time.   Skin: Skin is warm and dry. He is not diaphoretic. No erythema.   Vitals reviewed.        Results Reviewed:  I have personally reviewed current lab, radiology, and data and agree with results.  Lab Results (last 24 hours)     Procedure Component Value Units Date/Time    [place] Draw [[id number]] Collected:  04/07/17 1626    Specimen:  Blood Updated:  04/07/17 1655    Narrative:       The following orders were created for panel order [place] Draw.  Procedure                               Abnormality         Status                     ---------                               -----------         ------                     Light Blue Top[[id number]]                                    In process                 Green Top (Gel)[[id number]]                                   In process                 Lavender Top[[id number]]                                      In process                 Gold Top - SST[[id number]]                                    In process                   Please view results for these tests on the individual orders.    Gold Top - SST [[id number]] Collected:  04/07/17 1626    Specimen:  Blood Updated:  04/07/17 1655    Light Blue Top [[id number]] Collected:  04/07/17 1626    Specimen:  Blood Updated:  04/07/17 1655    Green Top (Gel) [[id number]] Collected:  04/07/17 1626    Specimen:  Blood Updated:  04/07/17 1655    Lavender Top [[id number]] Collected:  04/07/17 1626    Specimen:   Blood Updated:  04/07/17 1655    CBC & Differential [[id number]] Collected:  04/07/17 1626    Specimen:  Blood Updated:  04/07/17 1659    Narrative:       The following orders were created for panel order CBC & Differential.  Procedure                               Abnormality         Status                     ---------                               -----------         ------                     CBC Auto Differential[[id number]]         Abnormal            Final result                 Please view results for these tests on the individual orders.    CBC Auto Differential [[id number]]  (Abnormal) Collected:  04/07/17 1626    Specimen:  Blood Updated:  04/07/17 1659     WBC 8.23 10*3/mm3      RBC 4.81 10*6/mm3      Hemoglobin 13.4 (L) g/dL      Hematocrit 39.1 %      MCV 81.3 fL      MCH 27.9 pg      MCHC 34.3 g/dL      RDW 14.9 (H) %      RDW-SD 44.2 (H) fl      MPV 8.6 fL      Platelets 213 10*3/mm3      Neutrophil % 67.5 %      Lymphocyte % 18.2 %      Monocyte % 9.7 %      Eosinophil % 3.3 %      Basophil % 0.2 %      Immature Grans % 1.1 (H) %      Neutrophils, Absolute 5.55 10*3/mm3      Lymphocytes, Absolute 1.50 10*3/mm3      Monocytes, Absolute 0.80 10*3/mm3      Eosinophils, Absolute 0.27 10*3/mm3      Basophils, Absolute 0.02 10*3/mm3      Immature Grans, Absolute 0.09 (H) 10*3/mm3     CK [[id number]]  (Abnormal) Collected:  04/07/17 1626    Specimen:  Blood Updated:  04/07/17 1710     Creatine Kinase 237 (H) U/L     Comprehensive Metabolic Panel [[id number]]  (Abnormal) Collected:  04/07/17 1626    Specimen:  Blood Updated:  04/07/17 1710     Glucose 117 (H) mg/dL      BUN 13 mg/dL      Creatinine 1.00 mg/dL      Sodium 138 mmol/L      Potassium 3.9 mmol/L      Chloride 97 mmol/L      CO2 29.0 mmol/L      Calcium 9.1 mg/dL      Total Protein 7.9 g/dL      Albumin 4.10 g/dL      ALT (SGPT) 54 U/L      AST (SGOT) 51 U/L      Alkaline Phosphatase 73 U/L      Total Bilirubin 0.4 mg/dL      eGFR Non African Amer 84  mL/min/1.73      Globulin 3.8 (H) gm/dL      A/G Ratio 1.1 g/dL      BUN/Creatinine Ratio 13.0     Anion Gap 12.0 mmol/L     aPTT [[id number]]  (Abnormal) Collected:  04/07/17 1626    Specimen:  Blood Updated:  04/07/17 1713     PTT 42.6 (H) seconds     Narrative:       The recommended Heparin therapeutic range is 68-97 seconds.    Troponin [[id number]]  (Normal) Collected:  04/07/17 1626    Specimen:  Blood Updated:  04/07/17 1722     Troponin I <0.012 ng/mL     BNP [[id number]]  (Normal) Collected:  04/07/17 1626    Specimen:  Blood Updated:  04/07/17 1722     proBNP 26.8 pg/mL     CK-MB [[id number]]  (Normal) Collected:  04/07/17 1626    Specimen:  Blood Updated:  04/07/17 1722     CKMB 1.93 ng/mL         Imaging Results (last 24 hours)     Procedure Component Value Units Date/Time    XR Chest 2 View [[id number]] Collected:  04/07/17 1648     Updated:  04/07/17 1651    Narrative:       Patient Name:  KEARA ESCOBAR  Patient ID:  [id number]   Ordering:  MAGED MONTEMAYOR  Attending:  MAGED MONTEMAYOR  Referring:  MAGED MONTEMAYOR  ------------------------------------------------    TWO VIEW CHEST    HISTORY: Chest pain. Shortness of air. Generalized chest pain.    Frontal and lateral films of the chest were obtained.  COMPARISON: March 25, 2017    EKG leads in place.  The lungs are clear of an acute process.  The heart is minimally enlarged.  The pulmonary vasculature is not increased.  No pleural effusion.  No pneumothorax.  No acute osseous abnormality.      Impression:       CONCLUSION:  No Acute Disease  Minimal cardiomegaly    [id number]    Electronically signed by:  Kulwant Currie MD  4/7/2017 4:50 PM CDT  Workstation: "[id number]            Assessment:    Principal Problem:    Precordial pain  Active Problems:    Morbid obesity with body mass index (BMI) of 60.0 to 69.9 in adult    Essential hypertension    Hyperlipidemia    Coronary arteriosclerosis in native artery    Obstructive sleep apnea syndrome     Pulmonary embolism    Long-term (current) use of anticoagulants            Plan:  Observation  Seriel Cardiac Enzymes  Telemetry  Antiemetics   Continue DAPT and Coumadin    I discussed the patients findings and my recommendations with: Patient    LALA Larry  04/07/17  6:25 PM          I personally evaluated and examined the patient in conjunction with  LALA Larry, APC and agree with the assessment, treatment plan, and disposition of the patient as recorded.     Obese   Lungs are clear

## 2017-04-07 NOTE — ED PROVIDER NOTES
Subjective   HPI Comments: Cardiac stent placed 7/1/16, and again last month, and then patient developed PE's 2 weeks later. He is currently on coumadin and lovenox.    Patient is a 36 y.o. male presenting with chest pain.   Chest Pain   Pain location:  R chest  Pain quality: aching and pressure    Pain radiates to:  Does not radiate  Pain severity:  Mild  Onset quality:  Gradual  Duration:  1 week  Timing:  Intermittent  Progression:  Unchanged  Chronicity:  Recurrent  Relieved by:  Nothing  Worsened by:  Nothing  Associated symptoms: nausea and shortness of breath (mild)    Associated symptoms: no abdominal pain, no anxiety, no back pain, no claudication, no cough, no diaphoresis, no dizziness, no dysphagia, no fatigue, no fever, no headache, no lower extremity edema, no near-syncope, no numbness, no orthopnea, no palpitations, no syncope, no vomiting and no weakness        Review of Systems   Constitutional: Negative for appetite change, chills, diaphoresis, fatigue and fever.   HENT: Negative for congestion, ear discharge, ear pain, nosebleeds, rhinorrhea, sinus pressure, sore throat and trouble swallowing.    Eyes: Negative for discharge and redness.   Respiratory: Positive for shortness of breath (mild). Negative for apnea, cough, chest tightness and wheezing.    Cardiovascular: Positive for chest pain. Negative for palpitations, orthopnea, claudication, syncope and near-syncope.   Gastrointestinal: Positive for nausea. Negative for abdominal pain, diarrhea and vomiting.   Endocrine: Negative for polyuria.   Genitourinary: Negative for dysuria, frequency and urgency.   Musculoskeletal: Negative for back pain, myalgias and neck pain.   Skin: Negative for color change and rash.   Allergic/Immunologic: Negative for immunocompromised state.   Neurological: Negative for dizziness, seizures, syncope, weakness, light-headedness, numbness and headaches.   Hematological: Negative for adenopathy. Does not bruise/bleed  easily.   Psychiatric/Behavioral: Negative for behavioral problems and confusion.   All other systems reviewed and are negative.      Past Medical History:   Diagnosis Date   • Abdominal pain    • Acute right otitis media    • Allergic rhinitis    • Asthma    • Backache    • Bronchitis    • Cellulitis of lower leg     Right   • Chest pain    • Chronic back pain    • Chronic pain    • Cough    • Diarrhea    • Dyspnea    • GERD (gastroesophageal reflux disease)    • Headache    • Hip pain    • Hypertension    • Insomnia    • Low back pain    • Lumbosacral strain    • Morbid obesity    • RLS (restless legs syndrome)    • Seizures        Allergies   Allergen Reactions   • Glipizide    • Phenergan [Promethazine Hcl]    • Risperdal [Risperidone]    • Tramadol        Past Surgical History:   Procedure Laterality Date   • CARDIAC CATHETERIZATION      LAD stent   • CARDIAC CATHETERIZATION N/A 3/15/2017    Procedure: Left Heart Cath;  Surgeon: Edwige Briceno MD;  Location: [facility] CATH INVASIVE LOCATION;  Service:    • CARDIAC CATHETERIZATION N/A 3/15/2017    Procedure: Stent SOPHIA coronary;  Surgeon: Edwige Briceno MD;  Location: [facility] CATH INVASIVE LOCATION;  Service:    • [place] RT/LT HEART CATHETERS N/A 3/15/2017    Procedure: Percutaneous Coronary Intervention;  Surgeon: Edwige Briceno MD;  Location: [facility] CATH INVASIVE LOCATION;  Service: Cardiovascular   • TRANSESOPHAGEAL ECHOCARDIOGRAM (MONICA)      With color flow       Family History   Problem Relation Age of Onset   • Cancer Other    • Coronary artery disease Other    • Diabetes Other    • Heart disease Other    • Hypertension Other        Social History     Social History   • Marital status:      Spouse name: N/A   • Number of children: N/A   • Years of education: N/A     Social History Main Topics   • Smoking status: Former Smoker   • Smokeless tobacco: Current User     Types: Snuff   • Alcohol use No   • Drug use: No   • Sexual activity: Not Asked     Other Topics  Concern   • None     Social History Narrative           Objective   Physical Exam   Constitutional: He is oriented to person, place, and time. He appears well-developed and well-nourished.   HENT:   Head: Normocephalic and atraumatic.   Nose: Nose normal.   Mouth/Throat: Oropharynx is clear and moist.   Eyes: Conjunctivae and EOM are normal. Pupils are equal, round, and reactive to light. Right eye exhibits no discharge. Left eye exhibits no discharge. No scleral icterus.   Neck: Normal range of motion. Neck supple. No tracheal deviation present.   Cardiovascular: Normal rate, regular rhythm and normal heart sounds.    No murmur heard.  Pulmonary/Chest: Effort normal and breath sounds normal. No stridor. No respiratory distress. He has no wheezes. He has no rales.   Abdominal: Soft. Bowel sounds are normal. He exhibits no distension and no mass. There is no tenderness. There is no rebound and no guarding.   Musculoskeletal: He exhibits no edema.   Neurological: He is alert and oriented to person, place, and time. Coordination normal.   Skin: Skin is warm and dry. No rash noted. No erythema.   Psychiatric: He has a normal mood and affect. His behavior is normal. Thought content normal.   Nursing note and vitals reviewed.      Procedures         ED Course  ED Course          Labs Reviewed   COMPREHENSIVE METABOLIC PANEL - Abnormal; Notable for the following:        Result Value    Glucose 117 (*)     Globulin 3.8 (*)     All other components within normal limits   CBC WITH AUTO DIFFERENTIAL - Abnormal; Notable for the following:     Hemoglobin 13.4 (*)     RDW 14.9 (*)     RDW-SD 44.2 (*)     Immature Grans % 1.1 (*)     Immature Grans, Absolute 0.09 (*)     All other components within normal limits   CK - Abnormal; Notable for the following:     Creatine Kinase 237 (*)     All other components within normal limits   APTT - Abnormal; Notable for the following:     PTT 42.6 (*)     All other components within normal  limits    Narrative:     The recommended Heparin therapeutic range is 68-97 seconds.   TROPONIN (IN-HOUSE) - Normal   BNP (IN-HOUSE) - Normal   CK MB - Normal   [facility] DRAW    Narrative:     The following orders were created for panel order [place] Draw.  Procedure                               Abnormality         Status                     ---------                               -----------         ------                     Light Blue Top[[id number]]                                    In process                 Green Top (Gel)[[id number]]                                   In process                 Lavender Top[[id number]]                                      In process                 Gold Top - SST[[id number]]                                    In process                   Please view results for these tests on the individual orders.   TROPONIN (IN-HOUSE)   CBC AND DIFFERENTIAL    Narrative:     The following orders were created for panel order CBC & Differential.  Procedure                               Abnormality         Status                     ---------                               -----------         ------                     CBC Auto Differential[[id number]]         Abnormal            Final result                 Please view results for these tests on the individual orders.   LIGHT BLUE TOP   GREEN TOP   LAVENDER TOP   GOLD TOP - SST       XR Chest 2 View   Final Result   CONCLUSION:   No Acute Disease   Minimal cardiomegaly      [id number]      Electronically signed by:  Kulwant Currie MD  4/7/2017 4:50 PM CDT   Workstation: [id number]                    [facility]    Final diagnoses:   Precordial pain            Red Loya MD  04/07/17 2994

## 2017-04-08 VITALS
OXYGEN SATURATION: 98 % | SYSTOLIC BLOOD PRESSURE: 140 MMHG | HEIGHT: 71 IN | DIASTOLIC BLOOD PRESSURE: 72 MMHG | BODY MASS INDEX: 44.1 KG/M2 | WEIGHT: 315 LBS | TEMPERATURE: 97 F | RESPIRATION RATE: 19 BRPM | HEART RATE: 77 BPM

## 2017-04-08 LAB
ANION GAP SERPL CALCULATED.3IONS-SCNC: 11 MMOL/L (ref 5–15)
BASOPHILS # BLD AUTO: 0.02 10*3/MM3 (ref 0–0.2)
BASOPHILS NFR BLD AUTO: 0.3 % (ref 0–2)
BUN BLD-MCNC: 10 MG/DL (ref 7–21)
BUN/CREAT SERPL: 11.4 (ref 7–25)
CALCIUM SPEC-SCNC: 8.3 MG/DL (ref 8.4–10.2)
CHLORIDE SERPL-SCNC: 99 MMOL/L (ref 95–110)
CO2 SERPL-SCNC: 26 MMOL/L (ref 22–31)
CREAT BLD-MCNC: 0.88 MG/DL (ref 0.7–1.3)
DEPRECATED RDW RBC AUTO: 45.2 FL (ref 35.1–43.9)
EOSINOPHIL # BLD AUTO: 0.31 10*3/MM3 (ref 0–0.7)
EOSINOPHIL NFR BLD AUTO: 4.2 % (ref 0–7)
ERYTHROCYTE [DISTWIDTH] IN BLOOD BY AUTOMATED COUNT: 15.1 % (ref 11.5–14.5)
GFR SERPL CREATININE-BSD FRML MDRD: 97 ML/MIN/1.73 (ref 60–162)
GLUCOSE BLD-MCNC: 162 MG/DL (ref 60–100)
GLUCOSE BLDC GLUCOMTR-MCNC: 145 MG/DL (ref 70–130)
GLUCOSE BLDC GLUCOMTR-MCNC: 154 MG/DL (ref 70–130)
HCT VFR BLD AUTO: 38.1 % (ref 39–49)
HGB BLD-MCNC: 12.8 G/DL (ref 13.7–17.3)
IMM GRANULOCYTES # BLD: 0.1 10*3/MM3 (ref 0–0.02)
IMM GRANULOCYTES NFR BLD: 1.4 % (ref 0–0.5)
INR PPP: 1.48 (ref 0.8–1.2)
LYMPHOCYTES # BLD AUTO: 1.93 10*3/MM3 (ref 0.6–4.2)
LYMPHOCYTES NFR BLD AUTO: 26.4 % (ref 10–50)
MCH RBC QN AUTO: 27.6 PG (ref 26.5–34)
MCHC RBC AUTO-ENTMCNC: 33.6 G/DL (ref 31.5–36.3)
MCV RBC AUTO: 82.3 FL (ref 80–98)
MONOCYTES # BLD AUTO: 0.73 10*3/MM3 (ref 0–0.9)
MONOCYTES NFR BLD AUTO: 10 % (ref 0–12)
NEUTROPHILS # BLD AUTO: 4.23 10*3/MM3 (ref 2–8.6)
NEUTROPHILS NFR BLD AUTO: 57.7 % (ref 37–80)
NRBC BLD MANUAL-RTO: 0 /100 WBC (ref 0–0)
PLAT MORPH BLD: NORMAL
PLATELET # BLD AUTO: 165 10*3/MM3 (ref 150–450)
PMV BLD AUTO: 8.7 FL (ref 8–12)
POTASSIUM BLD-SCNC: 3.9 MMOL/L (ref 3.5–5.1)
PROTHROMBIN TIME: 17.9 SECONDS (ref 11.1–15.3)
RBC # BLD AUTO: 4.63 10*6/MM3 (ref 4.37–5.74)
RBC MORPH BLD: NORMAL
SODIUM BLD-SCNC: 136 MMOL/L (ref 137–145)
TROPONIN I SERPL-MCNC: <0.012 NG/ML
WBC MORPH BLD: NORMAL
WBC NRBC COR # BLD: 7.32 10*3/MM3 (ref 3.2–9.8)

## 2017-04-08 PROCEDURE — 82962 GLUCOSE BLOOD TEST: CPT

## 2017-04-08 PROCEDURE — 80048 BASIC METABOLIC PNL TOTAL CA: CPT | Performed by: NURSE PRACTITIONER

## 2017-04-08 PROCEDURE — 25010000002 ENOXAPARIN PER 10 MG: Performed by: NURSE PRACTITIONER

## 2017-04-08 PROCEDURE — 85007 BL SMEAR W/DIFF WBC COUNT: CPT | Performed by: NURSE PRACTITIONER

## 2017-04-08 PROCEDURE — 84484 ASSAY OF TROPONIN QUANT: CPT | Performed by: NURSE PRACTITIONER

## 2017-04-08 PROCEDURE — 93005 ELECTROCARDIOGRAM TRACING: CPT | Performed by: INTERNAL MEDICINE

## 2017-04-08 PROCEDURE — G0378 HOSPITAL OBSERVATION PER HR: HCPCS

## 2017-04-08 PROCEDURE — 25010000002 MORPHINE SULFATE (PF) 2 MG/ML SOLUTION: Performed by: FAMILY MEDICINE

## 2017-04-08 PROCEDURE — 96374 THER/PROPH/DIAG INJ IV PUSH: CPT

## 2017-04-08 PROCEDURE — 85025 COMPLETE CBC W/AUTO DIFF WBC: CPT | Performed by: NURSE PRACTITIONER

## 2017-04-08 PROCEDURE — 96376 TX/PRO/DX INJ SAME DRUG ADON: CPT

## 2017-04-08 PROCEDURE — 85610 PROTHROMBIN TIME: CPT | Performed by: NURSE PRACTITIONER

## 2017-04-08 RX ORDER — SUCRALFATE 1 G/1
1 TABLET ORAL 4 TIMES DAILY
Qty: 120 TABLET | Refills: 0 | Status: SHIPPED | OUTPATIENT
Start: 2017-04-08 | End: 2017-06-01

## 2017-04-08 RX ORDER — MORPHINE SULFATE 2 MG/ML
1 INJECTION, SOLUTION INTRAMUSCULAR; INTRAVENOUS EVERY 4 HOURS PRN
Status: DISCONTINUED | OUTPATIENT
Start: 2017-04-08 | End: 2017-04-08 | Stop reason: HOSPADM

## 2017-04-08 RX ADMIN — ISOSORBIDE MONONITRATE 120 MG: 60 TABLET, EXTENDED RELEASE ORAL at 08:06

## 2017-04-08 RX ADMIN — METOPROLOL SUCCINATE 100 MG: 100 TABLET, EXTENDED RELEASE ORAL at 08:06

## 2017-04-08 RX ADMIN — ENOXAPARIN SODIUM 150 MG: 150 INJECTION SUBCUTANEOUS at 08:07

## 2017-04-08 RX ADMIN — LISINOPRIL 40 MG: 40 TABLET ORAL at 08:06

## 2017-04-08 RX ADMIN — ONDANSETRON 4 MG: 4 TABLET, ORALLY DISINTEGRATING ORAL at 01:10

## 2017-04-08 RX ADMIN — AMLODIPINE BESYLATE 10 MG: 10 TABLET ORAL at 08:06

## 2017-04-08 RX ADMIN — MORPHINE SULFATE 1 MG: 2 INJECTION, SOLUTION INTRAMUSCULAR; INTRAVENOUS at 08:03

## 2017-04-08 RX ADMIN — RANOLAZINE 1000 MG: 500 TABLET, FILM COATED, EXTENDED RELEASE ORAL at 08:06

## 2017-04-08 RX ADMIN — PANTOPRAZOLE SODIUM 40 MG: 40 TABLET, DELAYED RELEASE ORAL at 08:06

## 2017-04-08 RX ADMIN — PRASUGREL HYDROCHLORIDE 10 MG: 10 TABLET, FILM COATED ORAL at 08:06

## 2017-04-08 RX ADMIN — MORPHINE SULFATE 1 MG: 2 INJECTION, SOLUTION INTRAMUSCULAR; INTRAVENOUS at 03:00

## 2017-04-08 NOTE — DISCHARGE SUMMARY
"    [facility] Medicine Services  DISCHARGE SUMMARY       Date of Admission: 4/7/2017  Date of Discharge:  4/8/2017  Primary Care Physician: LALA Read    Presenting Problem/History of Present Illness:  Precordial pain [R07.2]     Final Discharge Diagnoses:  Principal Problem:    Precordial pain  Active Problems:    Morbid obesity with body mass index (BMI) of 60.0 to 69.9 in adult    Essential hypertension    Hyperlipidemia    Coronary arteriosclerosis in native artery    Obstructive sleep apnea syndrome    Pulmonary embolism    Long-term (current) use of anticoagulants      Consults:   Consults     Date and Time Order Name Status Description    4/7/2017 1801 Hospitalist (on-call MD unless specified)      3/28/2017 1304 Inpatient Consult to Cardiothoracic Surgery Completed     3/25/2017 1039 Inpatient Consult to Gastroenterology Completed     3/25/2017 0921 Inpatient Consult to Cardiology Completed     3/13/2017 1724 Cardiology - Primary (on-call MD unless specified) Completed         HPI/Hospital Course:  The patient is a 36 y.o. male with a significant medical history of CAD s/p stenting and PE on coumadin. He was recently discharged with the new diagnosis of PE. He has noted since discharge having intermittent chest pain and daily nausea. In the ED his cardiac markers are negative and EKG is without any acute abnormalities. He was placed on observation.  Seriel cardiac markers became negative.  It was explained to the patient his pain is most likely secondary do his known pulmonary emboli and GI sources.  He was recommended to continue his current course of care with the addition of Carafate.  He is stable and discharged to home.    Condition on Discharge:  Stable    Physical Exam on Discharge:  /72 (BP Location: Left arm, Patient Position: Sitting)  Pulse 77  Temp 97 °F (36.1 °C) (Oral)   Resp 19  Ht 71\" (180.3 cm)  Wt (!) 459 lb 3.2 oz (208 kg)  SpO2 " 98%  BMI 64.05 kg/m2  Physical Exam   Constitutional: He is oriented to person, place, and time. He appears well-developed and well-nourished. No distress.   HENT:   Head: Normocephalic and atraumatic.   Mouth/Throat: Oropharynx is clear and moist.   Eyes: Conjunctivae and EOM are normal.   Neck: Normal range of motion. Neck supple.   Cardiovascular: Normal rate, regular rhythm and intact distal pulses.  Exam reveals no gallop and no friction rub.    No murmur heard.  Pulmonary/Chest: Effort normal and breath sounds normal. No respiratory distress. He has no wheezes. He has no rales. He exhibits no tenderness.   Abdominal: Soft. Bowel sounds are normal. He exhibits no distension. There is no tenderness.   Musculoskeletal: Normal range of motion. He exhibits no edema or deformity.   Neurological: He is alert and oriented to person, place, and time.   Skin: Skin is warm and dry. He is not diaphoretic. No erythema.   Vitals reviewed.      Discharge Disposition:  Home or Self Care    Discharge Medications:   Yordy Hansen   Home Medication Instructions ROCKY:[id number]    Printed on:04/08/17 2760   Medication Information                      albuterol (PROVENTIL HFA;VENTOLIN HFA) 108 (90 BASE) MCG/ACT inhaler  Inhale 2 puffs as needed for wheezing.             amLODIPine (NORVASC) 10 MG tablet  Take 10 mg by mouth daily.             aspirin 81 MG tablet  Take 81 mg by mouth Daily.             Canagliflozin (INVOKANA) 100 MG tablet  Take 1 tablet by mouth daily.             enoxaparin (LOVENOX) 150 MG/ML injection  Inject 1 mL under the skin Every 12 (Twelve) Hours.             FENOFIBRATE MICRONIZED PO  Take 1 tablet by mouth every night.             ipratropium-albuterol (DUO-NEB) 0.5-2.5 mg/mL nebulizer  Inhale 3 mL Every 4 (Four) Hours As Needed.             isosorbide mononitrate (IMDUR) 60 MG 24 hr tablet  Take 120 mg by mouth Daily.             Liraglutide (VICTOZA) 18 MG/3ML solution pen-injector  Inject   under the skin.             lisinopril (PRINIVIL,ZESTRIL) 20 MG tablet  Take 40 mg by mouth Daily.             lisinopril-hydrochlorothiazide (PRINZIDE,ZESTORETIC) 20-25 MG per tablet  Take 1 tablet by mouth.             metFORMIN (GLUCOPHAGE) 1000 MG tablet  Take 1,000 mg by mouth 2 (Two) Times a Day With Meals.             metoprolol succinate XL (TOPROL-XL) 100 MG 24 hr tablet  Take 1 tablet by mouth Daily.             MICROLET LANCETS misc  USE TO TEST BLOOD SUGAR EVERY DAY AND AS NEEDED             nitroglycerin (NITROSTAT) 0.4 MG SL tablet  Place 1 tablet under the tongue Every 5 (Five) Minutes As Needed for chest pain.             ondansetron ODT (ZOFRAN-ODT) 4 MG disintegrating tablet  Take 1 tablet by mouth Every 6 (Six) Hours As Needed for Nausea or Vomiting.             pantoprazole (PROTONIX) 40 MG EC tablet  Take 40 mg by mouth Daily.             pramipexole (MIRAPEX) 1 MG tablet  Take 1 mg by mouth Every Night. Taking 2mg daily             prasugrel (EFFIENT) 10 MG tablet  Take 1 tablet by mouth Daily.             pravastatin (PRAVACHOL) 40 MG tablet  Take 80 mg by mouth Every Night.             ranolazine (RANEXA) 1000 MG 12 hr tablet  Take 1 tablet by mouth Every 12 (Twelve) Hours.             sucralfate (CARAFATE) 1 G tablet  Take 1 tablet by mouth 4 (Four) Times a Day.             traZODone (DESYREL) 300 MG tablet  Take 300 mg by mouth.             warfarin (COUMADIN) 5 MG tablet  Take 1 tablet by mouth Daily. Monday 4/3 Tuesday 4/4 take 1.5 tablets                 Discharge Diet:   Diet Instructions     Diet: Cardiac; Thin Liquids, No Restrictions       Discharge Diet:  Cardiac   Fluid Consistency:  Thin Liquids, No Restrictions                 Activity at Discharge:   Activity Instructions     Activity as Tolerated                     Discharge Instructions:  1. Patient was instructed to return to the hospital should he again develop chest pain, nausea/vomiting, worsening shortness of air, or  fever.    Follow-up Appointments:  1. PCP 1 week  Future Appointments  Date Time Provider Department Center   4/10/2017 3:15 PM COUMADIN CLINIC CT [facility] None   4/13/2017 11:15 AM Edwige Briceno MD [facility] P[facility] None   6/13/2017 11:45 AM Edwige Briceno MD [facility] None       Artur Santana, APRN  04/08/17  2:52 PM

## 2017-04-08 NOTE — PLAN OF CARE
Problem: Patient Care Overview (Adult)  Goal: Plan of Care Review  Outcome: Ongoing (interventions implemented as appropriate)    04/07/17 2020   Coping/Psychosocial Response Interventions   Plan Of Care Reviewed With patient   Patient Care Overview   Progress no change   Outcome Evaluation   Outcome Summary/Follow up Plan new admit.         Problem: Acute Coronary Syndrome (ACS) (Adult)  Goal: Signs and Symptoms of Listed Potential Problems Will be Absent or Manageable (Acute Coronary Syndrome)  Outcome: Ongoing (interventions implemented as appropriate)    04/07/17 2020   Acute Coronary Syndrome (ACS)   Problems Assessed (Acute Coronary Syndrome (ACS)) all   Problems Present (Acute Coronary Syndrome (ACS)) chest pain (angina)         Problem: Pain, Acute (Adult)  Goal: Identify Related Risk Factors and Signs and Symptoms  Outcome: Ongoing (interventions implemented as appropriate)    04/07/17 2020   Pain, Acute   Related Risk Factors (Acute Pain) disease process

## 2017-04-10 ENCOUNTER — ANTICOAGULATION VISIT (OUTPATIENT)
Dept: CARDIAC SURGERY | Facility: CLINIC | Age: 37
End: 2017-04-10

## 2017-04-10 VITALS
BODY MASS INDEX: 64.28 KG/M2 | WEIGHT: 315 LBS | DIASTOLIC BLOOD PRESSURE: 67 MMHG | HEART RATE: 88 BPM | SYSTOLIC BLOOD PRESSURE: 144 MMHG

## 2017-04-10 DIAGNOSIS — Z79.01 LONG-TERM (CURRENT) USE OF ANTICOAGULANTS: ICD-10-CM

## 2017-04-10 DIAGNOSIS — I26.99 OTHER ACUTE PULMONARY EMBOLISM WITHOUT ACUTE COR PULMONALE (HCC): ICD-10-CM

## 2017-04-10 LAB — INR PPP: 1.7 (ref 0.9–1.1)

## 2017-04-10 PROCEDURE — 85610 PROTHROMBIN TIME: CPT | Performed by: NURSE PRACTITIONER

## 2017-04-10 NOTE — PROGRESS NOTES
Pt denies med changes or bleeding problems. Pt was instructed to continue Lovenox bid, Coumadin dosing as noted on calendar, and to avoid green veggies; pt verbalized. Patient instructed regarding medication; results given and questions answered. Nutritional counseling given.  Dietary factors affecting therapy addressed.  Patient instructed to monitor for excessive bruising or bleeding. Will recheck in 2 days.  Electronically signed by LALA Case

## 2017-04-12 ENCOUNTER — ANTICOAGULATION VISIT (OUTPATIENT)
Dept: CARDIAC SURGERY | Facility: CLINIC | Age: 37
End: 2017-04-12

## 2017-04-12 VITALS — SYSTOLIC BLOOD PRESSURE: 159 MMHG | HEART RATE: 84 BPM | DIASTOLIC BLOOD PRESSURE: 80 MMHG

## 2017-04-12 DIAGNOSIS — Z79.01 LONG-TERM (CURRENT) USE OF ANTICOAGULANTS: ICD-10-CM

## 2017-04-12 DIAGNOSIS — I26.99 OTHER ACUTE PULMONARY EMBOLISM WITHOUT ACUTE COR PULMONALE (HCC): ICD-10-CM

## 2017-04-12 LAB — INR PPP: 1.6 (ref 0.9–1.1)

## 2017-04-12 PROCEDURE — 99211 OFF/OP EST MAY X REQ PHY/QHP: CPT | Performed by: NURSE PRACTITIONER

## 2017-04-12 PROCEDURE — 85610 PROTHROMBIN TIME: CPT | Performed by: NURSE PRACTITIONER

## 2017-04-12 NOTE — PROGRESS NOTES
Pt denies med changes, bleeding problems, or any green veggie intake. Dose adjusted, pt instructed to continue Lovenox twice daily, and to avoid green veggies; pt verbalized. Patient instructed regarding medication; results given and questions answered. Nutritional counseling given.  Dietary factors affecting therapy addressed.  Patient instructed to monitor for excessive bruising or bleeding. Will recheck in 2 days.          This document has been electronically signed by LALA Winters on April 12, 2017 3:15 PM

## 2017-04-13 ENCOUNTER — OFFICE VISIT (OUTPATIENT)
Dept: CARDIOLOGY | Facility: CLINIC | Age: 37
End: 2017-04-13

## 2017-04-13 VITALS
DIASTOLIC BLOOD PRESSURE: 60 MMHG | OXYGEN SATURATION: 98 % | WEIGHT: 315 LBS | HEIGHT: 71 IN | SYSTOLIC BLOOD PRESSURE: 118 MMHG | HEART RATE: 72 BPM | RESPIRATION RATE: 18 BRPM | BODY MASS INDEX: 44.1 KG/M2

## 2017-04-13 DIAGNOSIS — I10 ESSENTIAL HYPERTENSION: ICD-10-CM

## 2017-04-13 DIAGNOSIS — I25.10 CORONARY ARTERY DISEASE INVOLVING NATIVE CORONARY ARTERY OF NATIVE HEART WITHOUT ANGINA PECTORIS: Primary | ICD-10-CM

## 2017-04-13 DIAGNOSIS — I26.99 OTHER ACUTE PULMONARY EMBOLISM WITHOUT ACUTE COR PULMONALE (HCC): ICD-10-CM

## 2017-04-13 DIAGNOSIS — E66.01 MORBID OBESITY WITH BODY MASS INDEX (BMI) OF 60.0 TO 69.9 IN ADULT (HCC): ICD-10-CM

## 2017-04-13 PROCEDURE — 99214 OFFICE O/P EST MOD 30 MIN: CPT | Performed by: INTERNAL MEDICINE

## 2017-04-13 RX ORDER — ORLISTAT 120 MG/1
120 CAPSULE ORAL
Qty: 180 CAPSULE | Refills: 6 | Status: SHIPPED | OUTPATIENT
Start: 2017-04-13 | End: 2017-04-25 | Stop reason: HOSPADM

## 2017-04-13 NOTE — PROGRESS NOTES
Chief complaint : Coronary artery disease, pulmonary embolism    History of Present Illness this is a very pleasant 36-year-old gentleman who comes today for a follow-up visit.  Patient was recently diagnosed with a pulmonary embolism and is currently being treated with anticoagulation.  Patient stated that he is feeling much better.  He has no chest pain or chest discomfort.  He states his shortness of breath has improved significantly.  Patient is interested in medication that would help her with weight loss.         Review of Systems   Constitution: Negative. Negative for decreased appetite, diaphoresis, weakness, night sweats, weight gain and weight loss.   HENT: Negative for headaches, hearing loss, nosebleeds and sore throat.    Eyes: Negative.  Negative for blurred vision and photophobia.   Cardiovascular: Negative for chest pain, claudication, dyspnea on exertion, irregular heartbeat, leg swelling, palpitations, paroxysmal nocturnal dyspnea and syncope.   Respiratory: Negative for cough, hemoptysis, shortness of breath and wheezing.    Endocrine: Negative for cold intolerance, heat intolerance, polydipsia, polyphagia and polyuria.   Hematologic/Lymphatic: Negative.    Skin: Negative for color change, dry skin, flushing, itching and rash.   Musculoskeletal: Negative.  Negative for muscle cramps, muscle weakness and myalgias.   Gastrointestinal: Negative for abdominal pain, change in bowel habit, diarrhea, hematemesis, melena, nausea and vomiting.   Genitourinary: Negative for dysuria, frequency and hematuria.   Neurological: Negative for dizziness, focal weakness, light-headedness, loss of balance, numbness and seizures.   Psychiatric/Behavioral: Negative.  Negative for substance abuse, suicidal ideas and thoughts of violence.   Allergic/Immunologic: Negative.        Past Medical History:   Diagnosis Date   • Abdominal pain    • Acute right otitis media    • Allergic rhinitis    • Asthma    • Backache    •  Bronchitis    • Cellulitis of lower leg     Right   • Chest pain    • Chronic back pain    • Chronic pain    • Cough    • Diarrhea    • Dyspnea    • GERD (gastroesophageal reflux disease)    • Headache    • Hip pain    • Hypertension    • Insomnia    • Low back pain    • Lumbosacral strain    • Morbid obesity    • Pulmonary embolism 03/24/2017   • RLS (restless legs syndrome)    • Seizures        Family History   Problem Relation Age of Onset   • Cancer Other    • Coronary artery disease Other    • Diabetes Other    • Heart disease Other    • Hypertension Other        Glipizide; Phenergan [promethazine hcl]; Risperdal [risperidone]; and Tramadol     reports that he has quit smoking. His smokeless tobacco use includes Snuff. He reports that he does not drink alcohol or use illicit drugs.    Objective     Vital Signs  Heart Rate:  [72-84] 72  Resp:  [18] 18  BP: (118-159)/(60-80) 118/60    Physical Exam   Constitutional: He is oriented to person, place, and time. He appears well-developed and well-nourished.   HENT:   Head: Normocephalic and atraumatic.   Eyes: Conjunctivae and EOM are normal. Pupils are equal, round, and reactive to light.   Neck: Neck supple. No JVD present. Carotid bruit is not present. No tracheal deviation and no edema present.   Cardiovascular: Normal rate, regular rhythm, S1 normal, S2 normal, normal heart sounds and intact distal pulses.  Exam reveals no gallop, no S3, no S4 and no friction rub.    No murmur heard.  Pulmonary/Chest: Effort normal and breath sounds normal. He has no wheezes. He has no rales. He exhibits no tenderness.   Abdominal: Bowel sounds are normal. He exhibits no abdominal bruit and no pulsatile midline mass. There is no rebound and no guarding.   Musculoskeletal: Normal range of motion. He exhibits no edema.   Neurological: He is alert and oriented to person, place, and time.   Skin: Skin is warm and dry.   Psychiatric: He has a normal mood and affect.        Procedures    Assessment/Plan     Patient Active Problem List   Diagnosis   • Morbid obesity with body mass index (BMI) of 60.0 to 69.9 in adult   • Essential hypertension   • Hyperlipidemia   • Coronary arteriosclerosis in native artery   • Precordial pain   • Unstable angina   • Morbid obesity   • Obstructive sleep apnea syndrome   • Chest pain   • Asthma   • Chronic back pain   • GERD (gastroesophageal reflux disease)   • Hypertension   • RLS (restless legs syndrome)   • Seizures   • Pulmonary embolism   • Encounter for anticoagulation discussion and counseling   • Long-term (current) use of anticoagulants   • Coronary artery disease involving native coronary artery of native heart without angina pectoris     1. Coronary artery disease involving native coronary artery of native heart without angina pectoris  March 15, 2017 patient underwent diagnostic coronary angiogram which revealed significant in-stent restenosis in the LAD stent.  He underwent successful PCI.  2.5 x 18 mm xience alpine stent was deployed successfully.  After his stent placement patient was asymptomatic with no chest pain and shortness of breath.  However several days later he came back to the hospital with shortness of breath and was diagnosed with acute pulmonary embolism.  Currently he is on medications for his pulmonary embolism and on guideline direct medical therapy for his coronary artery disease.  And he is currently has no symptoms of chest pain or chest discomfort and his shortness of breath has improved.  Plan:  · We will continue with guideline direct medical therapy.  · We will continue with risk factor modification.  · Continue with dual antiplatelet treatment with aspirin and Effient    2. Morbid obesity with body mass index (BMI) of 60.0 to 69.9 in adult  I have prescribed patient orlistat to be taken with meals.  He can take it out to 3 times a day.  Patient has been counseled regarding diet modification.  I have  instructed him to avoid fast foods.  Patient stated that he will start preparing his own meals.    3. Essential hypertension  Patient's blood pressure is well-controlled at this time.  Plan:  · Continue with current medications.  · Continue with lifestyle modification.  · Continue with low sodium diet.    4. Other acute pulmonary embolism without acute cor pulmonale  Patient currently is being treated with Lovenox and Coumadin.  He goes to Coumadin clinic.  If the patient can afford direct oral anticoagulants I suggest we switch him to eliquis 5 mg twice a day.  Oral Pradaxa 150 mg twice a day.    I discussed the patients findings and my recommendations with patient.    Edwige Briceno MD  04/13/17  1:30 PM    EMR Dragon/Transcription disclaimer:   Much of this encounter note is an electronic transcription/translation of spoken language to printed text. The electronic translation of spoken language may permit erroneous, or at times, nonsensical words or phrases to be inadvertently transcribed; Although I have reviewed the note for such errors, some may still exist.

## 2017-04-14 ENCOUNTER — ANTICOAGULATION VISIT (OUTPATIENT)
Dept: CARDIAC SURGERY | Facility: CLINIC | Age: 37
End: 2017-04-14

## 2017-04-14 VITALS — HEART RATE: 87 BPM | SYSTOLIC BLOOD PRESSURE: 131 MMHG | DIASTOLIC BLOOD PRESSURE: 72 MMHG

## 2017-04-14 DIAGNOSIS — I26.99 OTHER ACUTE PULMONARY EMBOLISM WITHOUT ACUTE COR PULMONALE (HCC): ICD-10-CM

## 2017-04-14 DIAGNOSIS — Z79.01 LONG-TERM (CURRENT) USE OF ANTICOAGULANTS: ICD-10-CM

## 2017-04-14 LAB — INR PPP: 2 (ref 0.9–1.1)

## 2017-04-14 PROCEDURE — 85610 PROTHROMBIN TIME: CPT | Performed by: NURSE PRACTITIONER

## 2017-04-14 PROCEDURE — 99211 OFF/OP EST MAY X REQ PHY/QHP: CPT | Performed by: NURSE PRACTITIONER

## 2017-04-19 ENCOUNTER — OFFICE VISIT (OUTPATIENT)
Dept: CARDIAC SURGERY | Facility: CLINIC | Age: 37
End: 2017-04-19

## 2017-04-19 ENCOUNTER — ANTICOAGULATION VISIT (OUTPATIENT)
Dept: CARDIAC SURGERY | Facility: CLINIC | Age: 37
End: 2017-04-19

## 2017-04-19 VITALS
SYSTOLIC BLOOD PRESSURE: 145 MMHG | BODY MASS INDEX: 44.1 KG/M2 | DIASTOLIC BLOOD PRESSURE: 80 MMHG | WEIGHT: 315 LBS | OXYGEN SATURATION: 98 % | TEMPERATURE: 96.9 F | HEART RATE: 75 BPM | HEIGHT: 71 IN

## 2017-04-19 DIAGNOSIS — I26.99 OTHER ACUTE PULMONARY EMBOLISM WITHOUT ACUTE COR PULMONALE (HCC): ICD-10-CM

## 2017-04-19 DIAGNOSIS — E66.01 MORBID OBESITY WITH BODY MASS INDEX (BMI) OF 60.0 TO 69.9 IN ADULT (HCC): ICD-10-CM

## 2017-04-19 DIAGNOSIS — Z71.89 ENCOUNTER FOR ANTICOAGULATION DISCUSSION AND COUNSELING: ICD-10-CM

## 2017-04-19 DIAGNOSIS — Z79.01 LONG-TERM (CURRENT) USE OF ANTICOAGULANTS: Primary | ICD-10-CM

## 2017-04-19 DIAGNOSIS — Z79.01 LONG-TERM (CURRENT) USE OF ANTICOAGULANTS: ICD-10-CM

## 2017-04-19 LAB — INR PPP: 2.1 (ref 0.9–1.1)

## 2017-04-19 PROCEDURE — 99214 OFFICE O/P EST MOD 30 MIN: CPT | Performed by: NURSE PRACTITIONER

## 2017-04-19 PROCEDURE — 85610 PROTHROMBIN TIME: CPT | Performed by: NURSE PRACTITIONER

## 2017-04-19 NOTE — PROGRESS NOTES
Subjective   Patient ID: Yordy Hansen is a 36 y.o. male is here today for follow-up for admission into coumadin clinic.  PCP MD Ronald Tam MD     History of Present Illness  History of Present Illness: 36 y.o. male with known CAD SP DUE stent X2 to LAD in June 2016 for which he continues on EFFIENT which he has tolerated well, he stopped smoking then. He is morbid obesity. He sttes he is not sedentary, and has no prolonged sitting. He present to ED with his symptoms; CTA pulmonary demonstrated RLL X 2 partial filling defects Echo: EF 50% Mild MR/TR No RV strain noted. Lower extremity Duplex: No DVT. CVTS saw in hospital for anticoagulation recommendations. He has been following in the coumadin clinic for dosing INR 2-3 and wishes to try NOAC medications. Presents for evaluation today. INR 2.1     The following portions of the patient's history were reviewed and updated as appropriate: allergies, current medications, past family history, past medical history, past social history, past surgical history and problem list.    Current Outpatient Prescriptions:   •  albuterol (PROVENTIL HFA;VENTOLIN HFA) 108 (90 BASE) MCG/ACT inhaler, Inhale 2 puffs as needed for wheezing., Disp: , Rfl:   •  amLODIPine (NORVASC) 10 MG tablet, Take 10 mg by mouth daily., Disp: , Rfl:   •  aspirin 81 MG tablet, Take 81 mg by mouth Daily., Disp: , Rfl:   •  Canagliflozin (INVOKANA) 100 MG tablet, Take 1 tablet by mouth daily., Disp: , Rfl:   •  FENOFIBRATE MICRONIZED PO, Take 1 tablet by mouth every night., Disp: , Rfl:   •  ipratropium-albuterol (DUO-NEB) 0.5-2.5 mg/mL nebulizer, Inhale 3 mL Every 4 (Four) Hours As Needed., Disp: , Rfl:   •  isosorbide mononitrate (IMDUR) 60 MG 24 hr tablet, Take 120 mg by mouth Daily., Disp: , Rfl:   •  Liraglutide (VICTOZA) 18 MG/3ML solution pen-injector, Inject  under the skin., Disp: , Rfl:   •  lisinopril (PRINIVIL,ZESTRIL) 20 MG tablet, Take 40 mg by mouth Daily., Disp: , Rfl:   •   lisinopril-hydrochlorothiazide (PRINZIDE,ZESTORETIC) 20-25 MG per tablet, Take 1 tablet by mouth., Disp: , Rfl:   •  metFORMIN (GLUCOPHAGE) 1000 MG tablet, Take 1,000 mg by mouth 2 (Two) Times a Day With Meals., Disp: , Rfl:   •  metoprolol succinate XL (TOPROL-XL) 100 MG 24 hr tablet, Take 1 tablet by mouth Daily., Disp: 30 tablet, Rfl: 1  •  MICROLET LANCETS misc, USE TO TEST BLOOD SUGAR EVERY DAY AND AS NEEDED, Disp: , Rfl:   •  nitroglycerin (NITROSTAT) 0.4 MG SL tablet, Place 1 tablet under the tongue Every 5 (Five) Minutes As Needed for chest pain., Disp: 25 tablet, Rfl: 6  •  ondansetron ODT (ZOFRAN-ODT) 4 MG disintegrating tablet, Take 1 tablet by mouth Every 6 (Six) Hours As Needed for Nausea or Vomiting., Disp: 12 tablet, Rfl: 0  •  orlistat (XENICAL) 120 MG capsule, Take 1 capsule by mouth 3 (Three) Times a Day With Meals., Disp: 180 capsule, Rfl: 6  •  pantoprazole (PROTONIX) 40 MG EC tablet, Take 40 mg by mouth Daily., Disp: , Rfl:   •  pramipexole (MIRAPEX) 1 MG tablet, Take 1 mg by mouth Every Night. Taking 2mg daily, Disp: , Rfl:   •  prasugrel (EFFIENT) 10 MG tablet, Take 1 tablet by mouth Daily., Disp: 30 tablet, Rfl: 1  •  pravastatin (PRAVACHOL) 40 MG tablet, Take 80 mg by mouth Every Night., Disp: , Rfl:   •  ranolazine (RANEXA) 1000 MG 12 hr tablet, Take 1 tablet by mouth Every 12 (Twelve) Hours., Disp: 60 tablet, Rfl: 1  •  sucralfate (CARAFATE) 1 G tablet, Take 1 tablet by mouth 4 (Four) Times a Day., Disp: 120 tablet, Rfl: 0  •  traZODone (DESYREL) 300 MG tablet, Take 300 mg by mouth., Disp: , Rfl:   •  rivaroxaban (XARELTO) 20 MG tablet, Take 1 tablet by mouth Daily With Dinner., Disp: 30 tablet, Rfl: 5    ROS  Constitutional: Negative for activity change, appetite change, chills, fatigue and fever.   Denies use of power tools, electric knives, chain saws, or conttact sports. Shaves with safety razor. Aware to use soft tooth brush and waxed floss.  No recent falls.  Denies: GIB, GUB, or  Seizures.   HENT: Negative for congestion, nosebleeds, sore throat and voice change.   Eyes: Negative for visual disturbance.   Respiratory. Negative for cough, choking, shortness of breath, wheezing and stridor.   Cardiovascular: Positive for chest pain (Improved ). Negative for palpitations and leg swelling.   Gastrointestinal: Negative for abdominal pain, blood in stool, constipation, diarrhea and nausea.   Genitourinary: Negative for hematuria.   Musculoskeletal: Negative for back pain and joint swelling.   Skin: Negative for color change, pallor and rash.   Allergic/Immunologic: Negative for environmental allergies.   Neurological: Negative for dizziness, seizures, syncope, weakness, light-headedness, numbness and headaches.   Hematological: Does not bruise/bleed easily.   Psychiatric/Behavioral: The patient is not nervous/anxious.         Objective   Physical Exam  Constitutional: He is oriented to person, place, and time. He appears morbidly obese.  HENT:   Head: Normocephalic.   Mouth/Throat: Oropharynx is clear and moist.   Eyes: Conjunctivae are normal. Pupils are equal, round, and reactive to light.   Neck: No JVD present. No tracheal deviation present.   Cardiovascular: Normal rate, regular rhythm, Distant normal heart sounds and intact distal pulses.   Pulses:  Carotid pulses are 1+ on the right side with bruit, and 1+ on the left side.  Radial pulses are 2+ on the right side, and 2+ on the left side.   Dorsalis pedis pulses are 2+ on the right side, and 2+ on the left side.   Posterior tibial pulses are 2+ on the right side, and 2+ on the left side.   Pulmonary/Chest: Effort normal and breath sounds normal. No stridor.   Distant  Abdominal: Soft. Bowel sounds are normal. He exhibits no distension. There is no tenderness.   Musculoskeletal: He exhibits no edema or tenderness.   Neurological: He is alert and oriented to person, place, and time. No cranial nerve deficit.   Skin: Skin is warm and dry. No  rash noted. No erythema. No pallor.   Psychiatric: Judgment normal.   Nursing note and vitals reviewed.         Assessment/Plan   Independent Review of Radiographic Studies:    No new studies     1. Long-term (current) use of anticoagulants  Factor II Positive will require life-long anticoagulation.     2. Encounter for anticoagulation discussion and counseling  DC Coumadin. Start Xarelto daily. Benefit investigation underway.   Return 6 weeks for evaluation.    3. Morbid obesity with body mass index (BMI) of 60.0 to 69.9 in adult  Discussed options for weight loss. Discussed rate of failure with Xarelto for morbid obesity.   Cautioned regarding return of symptoms of sudden onset SOA or Chest Pain to report immediately to ER or notify C for evaluation.     4. Other acute pulmonary embolism without acute cor pulmonale  No need for re-evaluation CTA Chest unless recurrence of symptoms. Discussed at length with patient.

## 2017-04-19 NOTE — PROGRESS NOTES
Pt saw LALA Vásquez today and was switched to Xarelto. Instructions per Yolanda.          This document has been electronically signed by LALA Winters on April 20, 2017 2:23 PM

## 2017-04-19 NOTE — PATIENT INSTRUCTIONS
Stop Coumadin today. Eat greens!!     Start Xarelto 20mg daily   * 30d free card  Will follow up on coverage.    Return 6 weeks - Yolanda REEVES    Notify provider if you experience excessive bleeding from the nose, cuts, gums, rectum, urinary tract, or vagina. Reddish or brown urine or stool. Vomiting of blood or hemorrhoidal bleeding. If major injury occurs present to the Emergency Department.

## 2017-04-22 ENCOUNTER — HOSPITAL ENCOUNTER (INPATIENT)
Facility: HOSPITAL | Age: 37
LOS: 3 days | Discharge: HOME | End: 2017-04-25
Attending: EMERGENCY MEDICINE | Admitting: INTERNAL MEDICINE

## 2017-04-22 ENCOUNTER — APPOINTMENT (OUTPATIENT)
Dept: GENERAL RADIOLOGY | Facility: HOSPITAL | Age: 37
End: 2017-04-22

## 2017-04-22 ENCOUNTER — APPOINTMENT (OUTPATIENT)
Dept: CARDIOLOGY | Facility: HOSPITAL | Age: 37
End: 2017-04-22
Attending: INTERNAL MEDICINE

## 2017-04-22 ENCOUNTER — APPOINTMENT (OUTPATIENT)
Dept: ULTRASOUND IMAGING | Facility: HOSPITAL | Age: 37
End: 2017-04-22

## 2017-04-22 ENCOUNTER — APPOINTMENT (OUTPATIENT)
Dept: CT IMAGING | Facility: HOSPITAL | Age: 37
End: 2017-04-22

## 2017-04-22 DIAGNOSIS — I26.09 OTHER PULMONARY EMBOLISM WITH ACUTE COR PULMONALE, UNSPECIFIED CHRONICITY (HCC): ICD-10-CM

## 2017-04-22 DIAGNOSIS — R07.9 CHEST PAIN, UNSPECIFIED TYPE: Primary | ICD-10-CM

## 2017-04-22 DIAGNOSIS — I10 ESSENTIAL HYPERTENSION: Chronic | ICD-10-CM

## 2017-04-22 DIAGNOSIS — R06.02 SHORTNESS OF BREATH: ICD-10-CM

## 2017-04-22 DIAGNOSIS — Z74.09 IMPAIRED MOBILITY AND ADLS: ICD-10-CM

## 2017-04-22 DIAGNOSIS — Z78.9 IMPAIRED MOBILITY AND ADLS: ICD-10-CM

## 2017-04-22 DIAGNOSIS — E66.01 MORBID OBESITY, UNSPECIFIED OBESITY TYPE (HCC): ICD-10-CM

## 2017-04-22 PROBLEM — I26.99 PULMONARY EMBOLISM: Status: ACTIVE | Noted: 2017-04-22

## 2017-04-22 LAB
ALBUMIN SERPL-MCNC: 3.6 G/DL (ref 3.4–4.8)
ALBUMIN/GLOB SERPL: 1 G/DL (ref 1.1–1.8)
ALP SERPL-CCNC: 59 U/L (ref 38–126)
ALT SERPL W P-5'-P-CCNC: 35 U/L (ref 21–72)
ANION GAP SERPL CALCULATED.3IONS-SCNC: 10 MMOL/L (ref 5–15)
APTT PPP: 33.2 SECONDS (ref 20–40.3)
ARTERIAL PATENCY WRIST A: ABNORMAL
AST SERPL-CCNC: 34 U/L (ref 17–59)
ATMOSPHERIC PRESS: ABNORMAL MMHG
BASE EXCESS BLDA CALC-SCNC: 0.9 MMOL/L (ref -2.4–2.4)
BASOPHILS # BLD AUTO: 0.02 10*3/MM3 (ref 0–0.2)
BASOPHILS NFR BLD AUTO: 0.3 % (ref 0–2)
BDY SITE: ABNORMAL
BH CV ECHO MEAS - ACS: 1.9 CM
BH CV ECHO MEAS - AO ISTHMUS: 2.1 CM
BH CV ECHO MEAS - AO MAX PG (FULL): -0.28 MMHG
BH CV ECHO MEAS - AO MAX PG: 8.5 MMHG
BH CV ECHO MEAS - AO MEAN PG (FULL): 0.13 MMHG
BH CV ECHO MEAS - AO MEAN PG: 5 MMHG
BH CV ECHO MEAS - AO ROOT AREA (BSA CORRECTED): 1.1
BH CV ECHO MEAS - AO ROOT AREA: 8.4 CM^2
BH CV ECHO MEAS - AO ROOT DIAM: 3.3 CM
BH CV ECHO MEAS - AO V2 MAX: 145.4 CM/SEC
BH CV ECHO MEAS - AO V2 MEAN: 108.9 CM/SEC
BH CV ECHO MEAS - AO V2 VTI: 28 CM
BH CV ECHO MEAS - ASC AORTA: 2.7 CM
BH CV ECHO MEAS - AVA(I,A): 4.5 CM^2
BH CV ECHO MEAS - AVA(I,D): 4.5 CM^2
BH CV ECHO MEAS - AVA(V,A): 4.1 CM^2
BH CV ECHO MEAS - AVA(V,D): 4.1 CM^2
BH CV ECHO MEAS - BSA(HAYCOCK): 3.4 M^2
BH CV ECHO MEAS - BSA: 3 M^2
BH CV ECHO MEAS - BZI_BMI: 64.9 KILOGRAMS/M^2
BH CV ECHO MEAS - BZI_METRIC_HEIGHT: 180.3 CM
BH CV ECHO MEAS - BZI_METRIC_WEIGHT: 210.9 KG
BH CV ECHO MEAS - EDV(CUBED): 185.7 ML
BH CV ECHO MEAS - EDV(TEICH): 160.4 ML
BH CV ECHO MEAS - EF(CUBED): 61 %
BH CV ECHO MEAS - EF(TEICH): 51.9 %
BH CV ECHO MEAS - ESV(CUBED): 72.5 ML
BH CV ECHO MEAS - ESV(TEICH): 77.2 ML
BH CV ECHO MEAS - FS: 26.9 %
BH CV ECHO MEAS - IVS/LVPW: 1.1
BH CV ECHO MEAS - IVSD: 1.1 CM
BH CV ECHO MEAS - LA DIMENSION: 2.9 CM
BH CV ECHO MEAS - LA/AO: 0.9
BH CV ECHO MEAS - LV MASS(C)D: 232 GRAMS
BH CV ECHO MEAS - LV MASS(C)DI: 76.9 GRAMS/M^2
BH CV ECHO MEAS - LV MAX PG: 8.7 MMHG
BH CV ECHO MEAS - LV MEAN PG: 4.8 MMHG
BH CV ECHO MEAS - LV V1 MAX: 147.8 CM/SEC
BH CV ECHO MEAS - LV V1 MEAN: 102.8 CM/SEC
BH CV ECHO MEAS - LV V1 VTI: 31.1 CM
BH CV ECHO MEAS - LVIDD: 5.7 CM
BH CV ECHO MEAS - LVIDS: 4.2 CM
BH CV ECHO MEAS - LVOT AREA (M): 4.2 CM^2
BH CV ECHO MEAS - LVOT AREA: 4.1 CM^2
BH CV ECHO MEAS - LVOT DIAM: 2.3 CM
BH CV ECHO MEAS - LVPWD: 0.98 CM
BH CV ECHO MEAS - MV A MAX VEL: 79.2 CM/SEC
BH CV ECHO MEAS - MV DEC SLOPE: 659.4 CM/SEC^2
BH CV ECHO MEAS - MV E MAX VEL: 91.3 CM/SEC
BH CV ECHO MEAS - MV E/A: 1.2
BH CV ECHO MEAS - MV MAX PG: 6.3 MMHG
BH CV ECHO MEAS - MV MEAN PG: 2.4 MMHG
BH CV ECHO MEAS - MV P1/2T MAX VEL: 124.2 CM/SEC
BH CV ECHO MEAS - MV P1/2T: 55.2 MSEC
BH CV ECHO MEAS - MV V2 MAX: 125.1 CM/SEC
BH CV ECHO MEAS - MV V2 MEAN: 69.8 CM/SEC
BH CV ECHO MEAS - MV V2 VTI: 33.2 CM
BH CV ECHO MEAS - MVA P1/2T LCG: 1.8 CM^2
BH CV ECHO MEAS - MVA(P1/2T): 4 CM^2
BH CV ECHO MEAS - MVA(VTI): 3.8 CM^2
BH CV ECHO MEAS - PA MAX PG: 6.6 MMHG
BH CV ECHO MEAS - PA V2 MAX: 128.1 CM/SEC
BH CV ECHO MEAS - RVDD: 2 CM
BH CV ECHO MEAS - SI(AO): 78.2 ML/M^2
BH CV ECHO MEAS - SI(CUBED): 37.5 ML/M^2
BH CV ECHO MEAS - SI(LVOT): 41.9 ML/M^2
BH CV ECHO MEAS - SI(TEICH): 27.5 ML/M^2
BH CV ECHO MEAS - SV(AO): 236.1 ML
BH CV ECHO MEAS - SV(CUBED): 113.2 ML
BH CV ECHO MEAS - SV(LVOT): 126.6 ML
BH CV ECHO MEAS - SV(TEICH): 83.1 ML
BH CV ECHO MEAS - TR MAX VEL: 175.4 CM/SEC
BILIRUB SERPL-MCNC: 0.4 MG/DL (ref 0.2–1.3)
BILIRUB UR QL STRIP: NEGATIVE
BUN BLD-MCNC: 15 MG/DL (ref 7–21)
BUN/CREAT SERPL: 17.4 (ref 7–25)
CA-I BLD-MCNC: 4.7 MG/DL (ref 4.5–4.9)
CALCIUM SPEC-SCNC: 8.3 MG/DL (ref 8.4–10.2)
CHLORIDE SERPL-SCNC: 101 MMOL/L (ref 95–110)
CK MB SERPL-CCNC: 2.47 NG/ML (ref 0–5)
CK MB SERPL-CCNC: 2.84 NG/ML (ref 0–5)
CK SERPL-CCNC: 310 U/L (ref 55–170)
CK SERPL-CCNC: 345 U/L (ref 55–170)
CLARITY UR: CLEAR
CO2 BLDA-SCNC: 27.4 MMOL/L (ref 23–27)
CO2 SERPL-SCNC: 25 MMOL/L (ref 22–31)
COLOR UR: YELLOW
CREAT BLD-MCNC: 0.86 MG/DL (ref 0.7–1.3)
D-DIMER, QUANTITATIVE (MAD,POW, STR): <270 NG/ML (FEU) (ref 0–470)
DEPRECATED RDW RBC AUTO: 43.9 FL (ref 35.1–43.9)
EOSINOPHIL # BLD AUTO: 0.26 10*3/MM3 (ref 0–0.7)
EOSINOPHIL NFR BLD AUTO: 3.7 % (ref 0–7)
ERYTHROCYTE [DISTWIDTH] IN BLOOD BY AUTOMATED COUNT: 14.7 % (ref 11.5–14.5)
GFR SERPL CREATININE-BSD FRML MDRD: 100 ML/MIN/1.73 (ref 70–162)
GLOBULIN UR ELPH-MCNC: 3.5 GM/DL (ref 2.3–3.5)
GLUCOSE BLD-MCNC: 208 MG/DL (ref 60–100)
GLUCOSE BLDA-MCNC: 213 MMOL/L
GLUCOSE BLDC GLUCOMTR-MCNC: 165 MG/DL (ref 70–130)
GLUCOSE BLDC GLUCOMTR-MCNC: 186 MG/DL (ref 70–130)
GLUCOSE UR STRIP-MCNC: ABNORMAL MG/DL
HCO3 BLDA-SCNC: 26 MMOL/L (ref 22–26)
HCT VFR BLD AUTO: 36 % (ref 39–49)
HCT VFR BLD CALC: 39 % (ref 40–54)
HGB BLD-MCNC: 12.4 G/DL (ref 13.7–17.3)
HGB BLDA-MCNC: 13.3 G/DL (ref 14–18)
HGB UR QL STRIP.AUTO: NEGATIVE
HOLD SPECIMEN: NORMAL
HOLD SPECIMEN: NORMAL
IMM GRANULOCYTES # BLD: 0.05 10*3/MM3 (ref 0–0.02)
IMM GRANULOCYTES NFR BLD: 0.7 % (ref 0–0.5)
INR PPP: 1.31 (ref 0.8–1.2)
KETONES UR QL STRIP: NEGATIVE
LEUKOCYTE ESTERASE UR QL STRIP.AUTO: NEGATIVE
LIPASE SERPL-CCNC: 82 U/L (ref 23–300)
LV EF 2D ECHO EST: 55 %
LYMPHOCYTES # BLD AUTO: 1.7 10*3/MM3 (ref 0.6–4.2)
LYMPHOCYTES NFR BLD AUTO: 24.1 % (ref 10–50)
MAGNESIUM SERPL-MCNC: 1.9 MG/DL (ref 1.6–2.3)
MCH RBC QN AUTO: 28.1 PG (ref 26.5–34)
MCHC RBC AUTO-ENTMCNC: 34.4 G/DL (ref 31.5–36.3)
MCV RBC AUTO: 81.4 FL (ref 80–98)
MODALITY: ABNORMAL
MONOCYTES # BLD AUTO: 0.54 10*3/MM3 (ref 0–0.9)
MONOCYTES NFR BLD AUTO: 7.6 % (ref 0–12)
NEUTROPHILS # BLD AUTO: 4.49 10*3/MM3 (ref 2–8.6)
NEUTROPHILS NFR BLD AUTO: 63.6 % (ref 37–80)
NITRITE UR QL STRIP: NEGATIVE
NT-PROBNP SERPL-MCNC: 95.9 PG/ML (ref 0–450)
PCO2 BLDA: 43.4 MM HG (ref 35–45)
PH BLDA: 7.4 PH UNITS (ref 7.35–7.45)
PH UR STRIP.AUTO: 6 [PH] (ref 5–9)
PLATELET # BLD AUTO: 183 10*3/MM3 (ref 150–450)
PMV BLD AUTO: 8.6 FL (ref 8–12)
PO2 BLDA: 77.9 MM HG (ref 80–105)
POTASSIUM BLD-SCNC: 3.9 MMOL/L (ref 3.5–5.1)
POTASSIUM BLDA-SCNC: 3.88 MMOL/L (ref 3.6–4.9)
PROT SERPL-MCNC: 7.1 G/DL (ref 6.3–8.6)
PROT UR QL STRIP: NEGATIVE
PROTHROMBIN TIME: 16.3 SECONDS (ref 11.1–15.3)
RBC # BLD AUTO: 4.42 10*6/MM3 (ref 4.37–5.74)
SAO2 % BLDCOA: 95.2 %
SODIUM BLD-SCNC: 136 MMOL/L (ref 137–145)
SODIUM BLDA-SCNC: 136.7 MMOL/L (ref 138–146)
SP GR UR STRIP: 1.02 (ref 1–1.03)
TROPONIN I SERPL-MCNC: <0.012 NG/ML
TROPONIN I SERPL-MCNC: <0.012 NG/ML
TSH SERPL DL<=0.05 MIU/L-ACNC: 3.26 MIU/ML (ref 0.46–4.68)
UROBILINOGEN UR QL STRIP: ABNORMAL
WBC NRBC COR # BLD: 7.06 10*3/MM3 (ref 3.2–9.8)
WHOLE BLOOD HOLD SPECIMEN: NORMAL
WHOLE BLOOD HOLD SPECIMEN: NORMAL

## 2017-04-22 PROCEDURE — 85025 COMPLETE CBC W/AUTO DIFF WBC: CPT | Performed by: EMERGENCY MEDICINE

## 2017-04-22 PROCEDURE — 93970 EXTREMITY STUDY: CPT

## 2017-04-22 PROCEDURE — 80053 COMPREHEN METABOLIC PANEL: CPT | Performed by: EMERGENCY MEDICINE

## 2017-04-22 PROCEDURE — 94799 UNLISTED PULMONARY SVC/PX: CPT

## 2017-04-22 PROCEDURE — 85610 PROTHROMBIN TIME: CPT | Performed by: EMERGENCY MEDICINE

## 2017-04-22 PROCEDURE — 82553 CREATINE MB FRACTION: CPT | Performed by: EMERGENCY MEDICINE

## 2017-04-22 PROCEDURE — 0 IOPAMIDOL PER 1 ML: Performed by: EMERGENCY MEDICINE

## 2017-04-22 PROCEDURE — 83690 ASSAY OF LIPASE: CPT | Performed by: EMERGENCY MEDICINE

## 2017-04-22 PROCEDURE — 25010000002 ENOXAPARIN PER 10 MG: Performed by: EMERGENCY MEDICINE

## 2017-04-22 PROCEDURE — 25010000002 MORPHINE SULFATE (PF) 2 MG/ML SOLUTION: Performed by: INTERNAL MEDICINE

## 2017-04-22 PROCEDURE — 94640 AIRWAY INHALATION TREATMENT: CPT

## 2017-04-22 PROCEDURE — 81003 URINALYSIS AUTO W/O SCOPE: CPT | Performed by: EMERGENCY MEDICINE

## 2017-04-22 PROCEDURE — 71010 HC CHEST PA OR AP: CPT

## 2017-04-22 PROCEDURE — 71275 CT ANGIOGRAPHY CHEST: CPT

## 2017-04-22 PROCEDURE — 82962 GLUCOSE BLOOD TEST: CPT

## 2017-04-22 PROCEDURE — 25010000002 LORAZEPAM PER 2 MG: Performed by: EMERGENCY MEDICINE

## 2017-04-22 PROCEDURE — 85730 THROMBOPLASTIN TIME PARTIAL: CPT | Performed by: EMERGENCY MEDICINE

## 2017-04-22 PROCEDURE — 99284 EMERGENCY DEPT VISIT MOD MDM: CPT

## 2017-04-22 PROCEDURE — 85379 FIBRIN DEGRADATION QUANT: CPT | Performed by: EMERGENCY MEDICINE

## 2017-04-22 PROCEDURE — 93010 ELECTROCARDIOGRAM REPORT: CPT | Performed by: INTERNAL MEDICINE

## 2017-04-22 PROCEDURE — 83735 ASSAY OF MAGNESIUM: CPT | Performed by: EMERGENCY MEDICINE

## 2017-04-22 PROCEDURE — 63710000001 INSULIN ASPART PER 5 UNITS: Performed by: INTERNAL MEDICINE

## 2017-04-22 PROCEDURE — 83880 ASSAY OF NATRIURETIC PEPTIDE: CPT | Performed by: EMERGENCY MEDICINE

## 2017-04-22 PROCEDURE — 25010000002 MORPHINE SULFATE (PF) 2 MG/ML SOLUTION: Performed by: EMERGENCY MEDICINE

## 2017-04-22 PROCEDURE — 84443 ASSAY THYROID STIM HORMONE: CPT | Performed by: EMERGENCY MEDICINE

## 2017-04-22 PROCEDURE — 93306 TTE W/DOPPLER COMPLETE: CPT | Performed by: INTERNAL MEDICINE

## 2017-04-22 PROCEDURE — 93306 TTE W/DOPPLER COMPLETE: CPT

## 2017-04-22 PROCEDURE — 93005 ELECTROCARDIOGRAM TRACING: CPT | Performed by: EMERGENCY MEDICINE

## 2017-04-22 PROCEDURE — 82803 BLOOD GASES ANY COMBINATION: CPT | Performed by: EMERGENCY MEDICINE

## 2017-04-22 PROCEDURE — 82550 ASSAY OF CK (CPK): CPT | Performed by: EMERGENCY MEDICINE

## 2017-04-22 PROCEDURE — 25010000002 MORPHINE PER 10 MG: Performed by: FAMILY MEDICINE

## 2017-04-22 PROCEDURE — 84484 ASSAY OF TROPONIN QUANT: CPT | Performed by: EMERGENCY MEDICINE

## 2017-04-22 PROCEDURE — 94760 N-INVAS EAR/PLS OXIMETRY 1: CPT

## 2017-04-22 RX ORDER — LORAZEPAM 2 MG/ML
0.5 INJECTION INTRAMUSCULAR ONCE
Status: COMPLETED | OUTPATIENT
Start: 2017-04-22 | End: 2017-04-22

## 2017-04-22 RX ORDER — NITROGLYCERIN 0.4 MG/1
0.4 TABLET SUBLINGUAL
Status: DISCONTINUED | OUTPATIENT
Start: 2017-04-22 | End: 2017-04-25 | Stop reason: HOSPADM

## 2017-04-22 RX ORDER — LORAZEPAM 1 MG/1
1 TABLET ORAL EVERY 6 HOURS PRN
Status: DISCONTINUED | OUTPATIENT
Start: 2017-04-22 | End: 2017-04-25 | Stop reason: HOSPADM

## 2017-04-22 RX ORDER — MORPHINE SULFATE 2 MG/ML
2 INJECTION, SOLUTION INTRAMUSCULAR; INTRAVENOUS ONCE
Status: COMPLETED | OUTPATIENT
Start: 2017-04-22 | End: 2017-04-22

## 2017-04-22 RX ORDER — SODIUM CHLORIDE 0.9 % (FLUSH) 0.9 %
10 SYRINGE (ML) INJECTION AS NEEDED
Status: DISCONTINUED | OUTPATIENT
Start: 2017-04-22 | End: 2017-04-25 | Stop reason: HOSPADM

## 2017-04-22 RX ORDER — MORPHINE SULFATE 4 MG/ML
4 INJECTION, SOLUTION INTRAMUSCULAR; INTRAVENOUS EVERY 4 HOURS PRN
Status: DISCONTINUED | OUTPATIENT
Start: 2017-04-22 | End: 2017-04-23

## 2017-04-22 RX ORDER — PANTOPRAZOLE SODIUM 40 MG/1
40 TABLET, DELAYED RELEASE ORAL DAILY
Status: DISCONTINUED | OUTPATIENT
Start: 2017-04-22 | End: 2017-04-25 | Stop reason: HOSPADM

## 2017-04-22 RX ORDER — SUCRALFATE 1 G/1
1 TABLET ORAL 4 TIMES DAILY
Status: DISCONTINUED | OUTPATIENT
Start: 2017-04-22 | End: 2017-04-25 | Stop reason: HOSPADM

## 2017-04-22 RX ORDER — MORPHINE SULFATE 4 MG/ML
3 INJECTION, SOLUTION INTRAMUSCULAR; INTRAVENOUS EVERY 4 HOURS PRN
Status: DISCONTINUED | OUTPATIENT
Start: 2017-04-22 | End: 2017-04-22

## 2017-04-22 RX ORDER — METOPROLOL SUCCINATE 100 MG/1
100 TABLET, EXTENDED RELEASE ORAL DAILY
Status: DISCONTINUED | OUTPATIENT
Start: 2017-04-22 | End: 2017-04-25 | Stop reason: HOSPADM

## 2017-04-22 RX ORDER — TRAZODONE HYDROCHLORIDE 150 MG/1
300 TABLET ORAL NIGHTLY
Status: DISCONTINUED | OUTPATIENT
Start: 2017-04-22 | End: 2017-04-25 | Stop reason: HOSPADM

## 2017-04-22 RX ORDER — IPRATROPIUM BROMIDE AND ALBUTEROL SULFATE 2.5; .5 MG/3ML; MG/3ML
3 SOLUTION RESPIRATORY (INHALATION)
Status: DISCONTINUED | OUTPATIENT
Start: 2017-04-22 | End: 2017-04-25 | Stop reason: HOSPADM

## 2017-04-22 RX ORDER — IPRATROPIUM BROMIDE AND ALBUTEROL SULFATE 2.5; .5 MG/3ML; MG/3ML
3 SOLUTION RESPIRATORY (INHALATION) EVERY 4 HOURS PRN
Status: DISCONTINUED | OUTPATIENT
Start: 2017-04-22 | End: 2017-04-25 | Stop reason: HOSPADM

## 2017-04-22 RX ORDER — LISINOPRIL 40 MG/1
40 TABLET ORAL DAILY
Status: DISCONTINUED | OUTPATIENT
Start: 2017-04-22 | End: 2017-04-25 | Stop reason: HOSPADM

## 2017-04-22 RX ORDER — ISOSORBIDE MONONITRATE 60 MG/1
120 TABLET, EXTENDED RELEASE ORAL DAILY
Status: DISCONTINUED | OUTPATIENT
Start: 2017-04-22 | End: 2017-04-25 | Stop reason: HOSPADM

## 2017-04-22 RX ORDER — PRASUGREL 10 MG/1
10 TABLET, FILM COATED ORAL DAILY
Status: DISCONTINUED | OUTPATIENT
Start: 2017-04-22 | End: 2017-04-25 | Stop reason: HOSPADM

## 2017-04-22 RX ORDER — MORPHINE SULFATE 2 MG/ML
1 INJECTION, SOLUTION INTRAMUSCULAR; INTRAVENOUS
Status: DISCONTINUED | OUTPATIENT
Start: 2017-04-22 | End: 2017-04-22

## 2017-04-22 RX ORDER — LISINOPRIL 40 MG/1
40 TABLET ORAL
Status: DISCONTINUED | OUTPATIENT
Start: 2017-04-22 | End: 2017-04-22 | Stop reason: SDUPTHER

## 2017-04-22 RX ORDER — DEXTROSE MONOHYDRATE 25 G/50ML
25 INJECTION, SOLUTION INTRAVENOUS
Status: DISCONTINUED | OUTPATIENT
Start: 2017-04-22 | End: 2017-04-25 | Stop reason: HOSPADM

## 2017-04-22 RX ORDER — ASPIRIN 325 MG
325 TABLET ORAL ONCE
Status: COMPLETED | OUTPATIENT
Start: 2017-04-22 | End: 2017-04-22

## 2017-04-22 RX ORDER — AMLODIPINE BESYLATE 10 MG/1
10 TABLET ORAL DAILY
Status: DISCONTINUED | OUTPATIENT
Start: 2017-04-22 | End: 2017-04-25 | Stop reason: HOSPADM

## 2017-04-22 RX ORDER — ALBUTEROL SULFATE 2.5 MG/3ML
2.5 SOLUTION RESPIRATORY (INHALATION) AS NEEDED
Status: DISCONTINUED | OUTPATIENT
Start: 2017-04-22 | End: 2017-04-25 | Stop reason: HOSPADM

## 2017-04-22 RX ORDER — ONDANSETRON 4 MG/1
4 TABLET, ORALLY DISINTEGRATING ORAL EVERY 6 HOURS PRN
Status: DISCONTINUED | OUTPATIENT
Start: 2017-04-22 | End: 2017-04-25 | Stop reason: HOSPADM

## 2017-04-22 RX ORDER — NICOTINE POLACRILEX 4 MG
15 LOZENGE BUCCAL
Status: DISCONTINUED | OUTPATIENT
Start: 2017-04-22 | End: 2017-04-25 | Stop reason: HOSPADM

## 2017-04-22 RX ORDER — HYDRALAZINE HYDROCHLORIDE 20 MG/ML
10 INJECTION INTRAMUSCULAR; INTRAVENOUS EVERY 4 HOURS PRN
Status: DISCONTINUED | OUTPATIENT
Start: 2017-04-22 | End: 2017-04-25 | Stop reason: HOSPADM

## 2017-04-22 RX ORDER — PRAVASTATIN SODIUM 40 MG
80 TABLET ORAL NIGHTLY
Status: DISCONTINUED | OUTPATIENT
Start: 2017-04-22 | End: 2017-04-25 | Stop reason: HOSPADM

## 2017-04-22 RX ORDER — PRAMIPEXOLE DIHYDROCHLORIDE 0.25 MG/1
0.25 TABLET ORAL NIGHTLY
Status: DISCONTINUED | OUTPATIENT
Start: 2017-04-22 | End: 2017-04-25 | Stop reason: HOSPADM

## 2017-04-22 RX ORDER — IPRATROPIUM BROMIDE AND ALBUTEROL SULFATE 2.5; .5 MG/3ML; MG/3ML
3 SOLUTION RESPIRATORY (INHALATION) ONCE
Status: COMPLETED | OUTPATIENT
Start: 2017-04-22 | End: 2017-04-22

## 2017-04-22 RX ORDER — RANOLAZINE 500 MG/1
1000 TABLET, EXTENDED RELEASE ORAL EVERY 12 HOURS SCHEDULED
Status: DISCONTINUED | OUTPATIENT
Start: 2017-04-22 | End: 2017-04-25 | Stop reason: HOSPADM

## 2017-04-22 RX ORDER — SODIUM CHLORIDE 0.9 % (FLUSH) 0.9 %
1-10 SYRINGE (ML) INJECTION AS NEEDED
Status: DISCONTINUED | OUTPATIENT
Start: 2017-04-22 | End: 2017-04-25 | Stop reason: HOSPADM

## 2017-04-22 RX ORDER — ASPIRIN 81 MG/1
81 TABLET, CHEWABLE ORAL DAILY
Status: DISCONTINUED | OUTPATIENT
Start: 2017-04-22 | End: 2017-04-25 | Stop reason: HOSPADM

## 2017-04-22 RX ADMIN — NITROGLYCERIN 1 INCH: 20 OINTMENT TOPICAL at 02:39

## 2017-04-22 RX ADMIN — INSULIN ASPART 3 UNITS: 100 INJECTION, SOLUTION INTRAVENOUS; SUBCUTANEOUS at 20:20

## 2017-04-22 RX ADMIN — IPRATROPIUM BROMIDE AND ALBUTEROL SULFATE 3 ML: 2.5; .5 SOLUTION RESPIRATORY (INHALATION) at 03:11

## 2017-04-22 RX ADMIN — MORPHINE SULFATE 2 MG: 2 INJECTION, SOLUTION INTRAMUSCULAR; INTRAVENOUS at 04:14

## 2017-04-22 RX ADMIN — Medication 10 ML: at 14:05

## 2017-04-22 RX ADMIN — MORPHINE SULFATE 4 MG: 4 INJECTION, SOLUTION INTRAMUSCULAR; INTRAVENOUS at 20:26

## 2017-04-22 RX ADMIN — MORPHINE SULFATE 1 MG: 2 INJECTION, SOLUTION INTRAMUSCULAR; INTRAVENOUS at 10:42

## 2017-04-22 RX ADMIN — ASPIRIN 81 MG: 81 TABLET, CHEWABLE ORAL at 09:30

## 2017-04-22 RX ADMIN — MORPHINE SULFATE 1 MG: 2 INJECTION, SOLUTION INTRAMUSCULAR; INTRAVENOUS at 06:14

## 2017-04-22 RX ADMIN — PRAVASTATIN SODIUM 80 MG: 40 TABLET ORAL at 20:21

## 2017-04-22 RX ADMIN — MORPHINE SULFATE 1 MG: 2 INJECTION, SOLUTION INTRAMUSCULAR; INTRAVENOUS at 14:04

## 2017-04-22 RX ADMIN — ENOXAPARIN SODIUM 120 MG: 120 INJECTION SUBCUTANEOUS at 04:15

## 2017-04-22 RX ADMIN — LISINOPRIL 40 MG: 40 TABLET ORAL at 09:30

## 2017-04-22 RX ADMIN — ASPIRIN 325 MG: 325 TABLET, COATED ORAL at 02:39

## 2017-04-22 RX ADMIN — INSULIN ASPART 2 UNITS: 100 INJECTION, SOLUTION INTRAVENOUS; SUBCUTANEOUS at 17:15

## 2017-04-22 RX ADMIN — INSULIN ASPART 2 UNITS: 100 INJECTION, SOLUTION INTRAVENOUS; SUBCUTANEOUS at 08:39

## 2017-04-22 RX ADMIN — SUCRALFATE 1 G: 1 TABLET ORAL at 11:03

## 2017-04-22 RX ADMIN — SUCRALFATE 1 G: 1 TABLET ORAL at 20:21

## 2017-04-22 RX ADMIN — RANOLAZINE 1000 MG: 500 TABLET, FILM COATED, EXTENDED RELEASE ORAL at 09:29

## 2017-04-22 RX ADMIN — IPRATROPIUM BROMIDE AND ALBUTEROL SULFATE 3 ML: 2.5; .5 SOLUTION RESPIRATORY (INHALATION) at 15:28

## 2017-04-22 RX ADMIN — PRASUGREL HYDROCHLORIDE 10 MG: 10 TABLET, FILM COATED ORAL at 09:29

## 2017-04-22 RX ADMIN — LORAZEPAM 1 MG: 1 TABLET ORAL at 16:15

## 2017-04-22 RX ADMIN — AMLODIPINE BESYLATE 10 MG: 10 TABLET ORAL at 09:30

## 2017-04-22 RX ADMIN — IOPAMIDOL 92 ML: 755 INJECTION, SOLUTION INTRAVENOUS at 03:05

## 2017-04-22 RX ADMIN — IPRATROPIUM BROMIDE AND ALBUTEROL SULFATE 3 ML: 2.5; .5 SOLUTION RESPIRATORY (INHALATION) at 21:23

## 2017-04-22 RX ADMIN — Medication 10 ML: at 10:30

## 2017-04-22 RX ADMIN — PANTOPRAZOLE SODIUM 40 MG: 40 TABLET, DELAYED RELEASE ORAL at 09:30

## 2017-04-22 RX ADMIN — PRAMIPEXOLE DIHYDROCHLORIDE 0.25 MG: 0.25 TABLET ORAL at 20:21

## 2017-04-22 RX ADMIN — RANOLAZINE 1000 MG: 500 TABLET, FILM COATED, EXTENDED RELEASE ORAL at 20:21

## 2017-04-22 RX ADMIN — SUCRALFATE 1 G: 1 TABLET ORAL at 17:16

## 2017-04-22 RX ADMIN — TRAZODONE HYDROCHLORIDE 300 MG: 150 TABLET ORAL at 20:21

## 2017-04-22 RX ADMIN — ISOSORBIDE MONONITRATE 120 MG: 60 TABLET, EXTENDED RELEASE ORAL at 09:30

## 2017-04-22 RX ADMIN — INSULIN ASPART 2 UNITS: 100 INJECTION, SOLUTION INTRAVENOUS; SUBCUTANEOUS at 11:03

## 2017-04-22 RX ADMIN — Medication 10 ML: at 09:33

## 2017-04-22 RX ADMIN — MORPHINE SULFATE 1 MG: 2 INJECTION, SOLUTION INTRAMUSCULAR; INTRAVENOUS at 08:12

## 2017-04-22 RX ADMIN — RIVAROXABAN 15 MG: 15 TABLET, FILM COATED ORAL at 17:01

## 2017-04-22 RX ADMIN — METOPROLOL SUCCINATE 100 MG: 100 TABLET, EXTENDED RELEASE ORAL at 09:30

## 2017-04-22 RX ADMIN — LORAZEPAM 0.5 MG: 2 INJECTION, SOLUTION INTRAMUSCULAR; INTRAVENOUS at 04:22

## 2017-04-22 NOTE — CONSULTS
"Adult Nutrition  Assessment    Patient Name:  Yordy Hansen  YOB: 1980  MRN: [id number]  Admit Date:  4/22/2017    Assessment Date:  4/22/2017          Reason for Assessment       04/22/17 1850    Reason for Assessment    Reason For Assessment/Visit education    Identified At Risk By Screening Criteria need for education   Wt Loss diet                  Anthropometrics       04/22/17 1850    Anthropometrics    Height 180.3 cm (70.98\")    Weight (!)  211 kg (465 lb 2.7 oz)    Ideal Body Weight (IBW)    Ideal Body Weight (IBW), Male (kg) 79.23    % Ideal Body Weight 266.87    Body Mass Index (BMI)    BMI (kg/m2) 65.04    BMI Grade greater than 40 - obesity grade III                Estimated/Assessed Needs       04/22/17 1851    Calculation Measurements    Weight Used For Calculations 111 kg (245 lb 9.5 oz)    Height Used for Calculations 1.803 m (5' 10.98\")    Estimated/Assessed Energy Needs    Energy Need Method Holland-St Jeor    Age 36    RMR (Holland-St. Jeor Equation) 2055.87    Activity Factors (Holland St Jeor)  Out of bed, ambulatory  1.3    Total estimated needs (Holland St. Jeor) 2672    Estimated/Assessed Protein Needs    Weight Used for Protein Calculation 111 kg (245 lb 9.5 oz)    Estimated Protein Range 111 - 133    Estimated/Assessed Fluid Needs    Fluid Need Method --   3342                Comments:  BMI 65 with pt at 266% IBW.  Making  Lifestyle Choices for a Healthier Weight used to provide ed regarding Wt Loss diet and diet copy given.        Electronically signed by:  Ananya Oglesby RD  04/22/17 6:54 PM  "

## 2017-04-22 NOTE — H&P
[facility] Medicine Admission      Date of Admission: 4/22/2017      Primary Care Physician: Edwige Briceno MD      Chief Complaint: chest pain     HPI:This is a 36 y old male morbidly obese with recent hx of PE who was recently discharged from the hospital  . Was first on coumadin and switched at the coumadin clinic on xarelto on 04/19 and then today started having chest pain then came to our facility where the CTA showed a new clot . We gave him a dose of lovenox in the ED   He states that he was compliant with his xarelto but he was taking it only once a day not the 15mg twice a day .previously he was on coumadin     Past Medical History:  has a past medical history of Abdominal pain; Acute right otitis media; Allergic rhinitis; Asthma; Backache; Bronchitis; Cellulitis of lower leg; Chest pain; Chronic back pain; Chronic pain; Cough; Diarrhea; Dyspnea; GERD (gastroesophageal reflux disease); Headache; Hip pain; Hypertension; Insomnia; Low back pain; Lumbosacral strain; Morbid obesity; Pulmonary embolism (03/24/2017); RLS (restless legs syndrome); and Seizures.    Past Surgical History:  has a past surgical history that includes transesophageal echocardiogram (travis); Cardiac catheterization; rt/lt heart catheters (N/A, 3/15/2017); Cardiac catheterization (N/A, 3/15/2017); and Cardiac catheterization (N/A, 3/15/2017).    Family History: family history includes Cancer in his other; Coronary artery disease in his other; Diabetes in his other; Heart disease in his other; Hypertension in his other.    Social History:  reports that he has quit smoking. His smokeless tobacco use includes Snuff. He reports that he does not drink alcohol or use illicit drugs.    Allergies:   Allergies   Allergen Reactions   • Glipizide      Slurred speech     • Phenergan [Promethazine Hcl]      Burning veins   • Risperdal [Risperidone]      Slurred speech   • Tramadol      seizures        Medications: Scheduled Meds:   Continuous Infusions:   PRN Meds:.•  nitroglycerin  •  sodium chloride  Prior to Admission medications    Medication Sig Start Date End Date Taking? Authorizing Provider   albuterol (PROVENTIL HFA;VENTOLIN HFA) 108 (90 BASE) MCG/ACT inhaler Inhale 2 puffs as needed for wheezing.    Historical Provider, MD   amLODIPine (NORVASC) 10 MG tablet Take 10 mg by mouth Every Night.    Historical Provider, MD   Canagliflozin (INVOKANA) 100 MG tablet Take 1 tablet by mouth Every Night.    Historical Provider, MD   clopidogrel (PLAVIX) 75 MG tablet Take 1 tablet by mouth Daily.  Patient taking differently: Take 75 mg by mouth Every Night. 4/25/17   Rehan Griffiths MD   HYDROcodone-acetaminophen (NORCO)  MG per tablet Take 1 tablet by mouth Every 6 (Six) Hours As Needed for Moderate Pain (4-6). 5/1/17   Wai Fischer MD   ipratropium-albuterol (DUO-NEB) 0.5-2.5 mg/mL nebulizer Inhale 3 mL Every 4 (Four) Hours As Needed. 9/4/16 8/31/17  Historical Provider, MD   isosorbide mononitrate (IMDUR) 120 MG 24 hr tablet Take 120 mg by mouth Every Morning.    Historical Provider, MD   Liraglutide (VICTOZA) 18 MG/3ML solution pen-injector Inject 1.8 mg under the skin Daily.    Historical Provider, MD   lisinopril (PRINIVIL,ZESTRIL) 40 MG tablet Take 40 mg by mouth Every Morning.    Historical Provider, MD   LORazepam (ATIVAN) 1 MG tablet Take 1 tablet by mouth 2 (Two) Times a Day As Needed for Anxiety. 5/4/17   Melchor Blackwell MD   metFORMIN (GLUCOPHAGE) 1000 MG tablet Take 1,000 mg by mouth 2 (Two) Times a Day With Meals.    Historical Provider, MD   metoprolol succinate XL (TOPROL-XL) 100 MG 24 hr tablet Take 1 tablet by mouth Daily.  Patient taking differently: Take 100 mg by mouth Every Night. 3/17/17   HENNA Rodriguez   MICROLET LANCETS misc USE TO TEST BLOOD SUGAR EVERY DAY AND AS NEEDED 6/20/16   Historical Provider, MD   nitroglycerin (NITROSTAT) 0.4 MG SL tablet Place 1  tablet under the tongue Every 5 (Five) Minutes As Needed for chest pain. 12/13/16   Edwige Briceno MD   ondansetron ODT (ZOFRAN-ODT) 4 MG disintegrating tablet Take 1 tablet by mouth Every 6 (Six) Hours As Needed for Nausea or Vomiting. 3/24/17   Ifeoma Goel PA-C   pantoprazole (PROTONIX) 40 MG EC tablet Take 40 mg by mouth Every Night. 12/6/16   Historical Provider, MD   pramipexole (MIRAPEX) 1 MG tablet Take 2 mg by mouth Every Night. Taking 2mg daily    Historical Provider, MD   pravastatin (PRAVACHOL) 80 MG tablet Take 80 mg by mouth Every Night.    Historical Provider, MD   ranolazine (RANEXA) 1000 MG 12 hr tablet Take 1 tablet by mouth Every 12 (Twelve) Hours. 3/17/17   HENNA Rodriguez   rivaroxaban (XARELTO) 15 MG tablet Take 1 tablet by mouth 2 (Two) Times a Day With Meals. 4/25/17   Rehan Griffiths MD   sucralfate (CARAFATE) 1 G tablet Take 1 tablet by mouth 4 (Four) Times a Day. 4/8/17   LALA Larry   traZODone (DESYREL) 300 MG tablet Take 300 mg by mouth Every Night.    Historical Provider, MD       Review of Systems:  Review of Systems   Constitutional: Negative for activity change, appetite change, chills, diaphoresis, fatigue, fever and unexpected weight change.   HENT: Negative.  Negative for congestion, dental problem, drooling, ear discharge, ear pain, facial swelling, hearing loss, mouth sores, nosebleeds, postnasal drip, rhinorrhea, sinus pressure, sneezing, tinnitus, trouble swallowing and voice change.    Eyes: Negative for photophobia and discharge.   Respiratory: Positive for shortness of breath. Negative for apnea, cough, choking, wheezing and stridor.    Cardiovascular: Positive for chest pain. Negative for palpitations and leg swelling.   Gastrointestinal: Negative for abdominal pain, anal bleeding, blood in stool, constipation, diarrhea, nausea, rectal pain and vomiting.   Endocrine: Negative for cold intolerance, heat intolerance, polydipsia, polyphagia and  polyuria.   Genitourinary: Negative for dysuria, enuresis, frequency, hematuria and urgency.   Musculoskeletal: Negative for arthralgias, back pain, joint swelling, myalgias, neck pain and neck stiffness.   Skin: Negative for color change, pallor, rash and wound.   Allergic/Immunologic: Negative for food allergies and immunocompromised state.   Neurological: Negative for dizziness, tremors, seizures, syncope, facial asymmetry, speech difficulty, weakness, light-headedness, numbness and headaches.   Hematological: Negative for adenopathy.   Psychiatric/Behavioral: Negative for agitation, behavioral problems, confusion, hallucinations, sleep disturbance and suicidal ideas. The patient is not nervous/anxious.       Otherwise complete ROS is negative except as mentioned above.    Physical Exam:   Temp:  [98.3 °F (36.8 °C)] 98.3 °F (36.8 °C)  Heart Rate:  [68-77] 73  Resp:  [22-26] 22  BP: (133-230)/(59-90) 168/78  Physical Exam   Constitutional: He is oriented to person, place, and time. He appears well-developed and well-nourished.  Non-toxic appearance. He does not have a sickly appearance. He does not appear ill. No distress. He is not intubated.   HENT:   Head: Normocephalic and atraumatic.   Right Ear: External ear normal.   Left Ear: External ear normal.   Nose: Nose normal.   Mouth/Throat: Oropharynx is clear and moist. No oropharyngeal exudate.   Eyes: Conjunctivae and EOM are normal. Pupils are equal, round, and reactive to light. Right eye exhibits no discharge and no exudate. Left eye exhibits no discharge and no exudate. Right conjunctiva is not injected. Right conjunctiva has no hemorrhage. Left conjunctiva is not injected. Left conjunctiva has no hemorrhage. No scleral icterus.   Neck: Normal range of motion. Neck supple. No hepatojugular reflux and no JVD present. No tracheal tenderness, no spinous process tenderness and no muscular tenderness present. Carotid bruit is not present. No rigidity. No  tracheal deviation, no edema, no erythema and normal range of motion present. No thyroid mass and no thyromegaly present.   Cardiovascular: Normal rate, regular rhythm, normal heart sounds and intact distal pulses.   No extrasystoles are present. Exam reveals no gallop and no friction rub.    No murmur heard.  Pulmonary/Chest: Effort normal and breath sounds normal. No stridor. He is not intubated. No respiratory distress. He has no decreased breath sounds. He has no wheezes. He has no rhonchi. He has no rales. He exhibits no tenderness.   Abdominal: Soft. Normal appearance and bowel sounds are normal. He exhibits no shifting dullness, no distension, no pulsatile liver, no fluid wave, no abdominal bruit, no ascites, no pulsatile midline mass and no mass. There is no hepatosplenomegaly, splenomegaly or hepatomegaly. There is no tenderness. There is no rigidity, no rebound, no guarding, no CVA tenderness, no tenderness at McBurney's point and negative Lora's sign. No hernia.   Genitourinary: Rectal exam shows guaiac negative stool.   Musculoskeletal: Normal range of motion. He exhibits no edema, tenderness or deformity.        Right shoulder: He exhibits no swelling.      Skin Integrity  -   He hasno right foot ulcer, non-callous right foot, no right foot warmth and no right foot blister. He has no left foot ulcer, non-callous left foot, no left foot warmth and no left foot blister..  Lymphadenopathy:     He has no cervical adenopathy.     He has no axillary adenopathy.   Neurological: He is alert and oriented to person, place, and time. He has normal strength and normal reflexes. He is not disoriented. He displays no atrophy, no tremor and normal reflexes. No cranial nerve deficit or sensory deficit. He exhibits normal muscle tone. He displays no seizure activity. Coordination and gait normal. He displays no Babinski's sign on the right side. He displays no Babinski's sign on the left side.   Skin: Skin is warm and  dry. No abrasion, no bruising, no burn, no ecchymosis, no laceration, no lesion, no purpura and no rash noted. Rash is not macular, not papular, not maculopapular, not nodular, not pustular, not vesicular and not urticarial. He is not diaphoretic. No cyanosis or erythema. No pallor. Nails show no clubbing.   Psychiatric: He has a normal mood and affect. His behavior is normal. Judgment and thought content normal. His mood appears not anxious. His affect is not angry, not blunt, not labile and not inappropriate. His speech is not slurred. He is not agitated, not aggressive, not hyperactive, not slowed, not withdrawn and not combative. Thought content is not paranoid and not delusional. Cognition and memory are not impaired. He does not express impulsivity or inappropriate judgment. He does not exhibit a depressed mood. He expresses no homicidal and no suicidal ideation. He expresses no suicidal plans and no homicidal plans. He is communicative. He exhibits normal recent memory and normal remote memory.         Results Reviewed:  I have personally reviewed current lab, radiology, and data and agree with results.  Lab Results (last 24 hours)     Procedure Component Value Units Date/Time    CBC & Differential [[id number]] Collected:  04/22/17 0156    Specimen:  Blood Updated:  04/22/17 0212    Narrative:       The following orders were created for panel order CBC & Differential.  Procedure                               Abnormality         Status                     ---------                               -----------         ------                     CBC Auto Differential[[id number]]         Abnormal            Final result                 Please view results for these tests on the individual orders.    CBC Auto Differential [[id number]]  (Abnormal) Collected:  04/22/17 0156    Specimen:  Blood Updated:  04/22/17 0212     WBC 7.06 10*3/mm3      RBC 4.42 10*6/mm3      Hemoglobin 12.4 (L) g/dL      Hematocrit 36.0 (L) %       MCV 81.4 fL      MCH 28.1 pg      MCHC 34.4 g/dL      RDW 14.7 (H) %      RDW-SD 43.9 fl      MPV 8.6 fL      Platelets 183 10*3/mm3      Neutrophil % 63.6 %      Lymphocyte % 24.1 %      Monocyte % 7.6 %      Eosinophil % 3.7 %      Basophil % 0.3 %      Immature Grans % 0.7 (H) %      Neutrophils, Absolute 4.49 10*3/mm3      Lymphocytes, Absolute 1.70 10*3/mm3      Monocytes, Absolute 0.54 10*3/mm3      Eosinophils, Absolute 0.26 10*3/mm3      Basophils, Absolute 0.02 10*3/mm3      Immature Grans, Absolute 0.05 (H) 10*3/mm3     CK [[id number]]  (Abnormal) Collected:  04/22/17 0156    Specimen:  Blood Updated:  04/22/17 0225     Creatine Kinase 345 (H) U/L     Lipase [[id number]]  (Normal) Collected:  04/22/17 0156    Specimen:  Blood Updated:  04/22/17 0225     Lipase 82 U/L     Magnesium [[id number]]  (Normal) Collected:  04/22/17 0156    Specimen:  Blood Updated:  04/22/17 0225     Magnesium 1.9 mg/dL     Protime-INR [[id number]]  (Abnormal) Collected:  04/22/17 0159    Specimen:  Blood Updated:  04/22/17 0233     Protime 16.3 (H) Seconds      INR 1.31 (H)    Narrative:       Therapeutic range for most indications is 2.0-3.0 INR,  or 2.5-3.5 for mechanical heart valves.    aPTT [[id number]]  (Normal) Collected:  04/22/17 0159    Specimen:  Blood Updated:  04/22/17 0233     PTT 33.2 seconds     Narrative:       The recommended Heparin therapeutic range is 68-97 seconds.    CK-MB [[id number]]  (Normal) Collected:  04/22/17 0156    Specimen:  Blood Updated:  04/22/17 0234     CKMB 2.84 ng/mL     D-dimer, Quantitative [[id number]]  (Normal) Collected:  04/22/17 0159    Specimen:  Blood Updated:  04/22/17 0234     D-Dimer, Quantitative <270 ng/mL (FEU)     Narrative:       Dimer values <500 ng/ml FEU are FDA approved as aid in diagnosis of deep venous thrombosis and pulmonary embolism.  This test should not be used in an exclusion strategy with pretest probability alone.    A recent guideline regarding diagnosis for pulmonary  thomboembolism recommends an adjusted exclusion criterion of age x 10 ng/ml FEU for patients >50 years of age ([employer] Intern Med 2015; 163: 701-711).    BNP [[id number]]  (Normal) Collected:  04/22/17 0156    Specimen:  Blood Updated:  04/22/17 0235     proBNP 95.9 pg/mL     Troponin [[id number]]  (Normal) Collected:  04/22/17 0156    Specimen:  Blood Updated:  04/22/17 0240     Troponin I <0.012 ng/mL     Blood Gas, Arterial [[id number]]  (Abnormal) Collected:  04/22/17 0237    Specimen:  Arterial Blood Updated:  04/22/17 0243     Site --      Not performed at this site.        Cory's Test --      Not performed at this site.        pH, Arterial 7.396 pH units      pCO2, Arterial 43.4 mm Hg      pO2, Arterial 77.9 (L) mm Hg      HCO3, Arterial 26.0 mmol/L      Base Excess, Arterial 0.9 mmol/L      O2 Saturation, Arterial 95.2 %      Hemoglobin, Blood Gas 13.3 (L) g/dL      Hematocrit, Blood Gas 39.0 (L) %      CO2 Content 27.4 (H)     Sodium, Arterial 136.7 (L) mmol/L      Potassium, Arterial 3.88 mmol/L      Glucose, Arterial 213 mmol/L      Barometric Pressure for Blood Gas -- mmHg       Not performed at this site.        Modality --      Not performed at this site.        Ionized Calcium 4.7 mg/dL     Urinalysis With / Culture If Indicated [[id number]]  (Abnormal) Collected:  04/22/17 0241    Specimen:  Urine from Urine, Clean Catch Updated:  04/22/17 0248     Color, UA Yellow     Appearance, UA Clear     pH, UA 6.0     Specific Gravity, UA 1.018     Glucose, UA >=1000 mg/dL (3+) (A)     Ketones, UA Negative     Bilirubin, UA Negative     Blood, UA Negative     Protein, UA Negative     Leuk Esterase, UA Negative     Nitrite, UA Negative     Urobilinogen, UA 1.0 E.U./dL    Narrative:       Urine microscopic not indicated.    TSH [[id number]]  (Normal) Collected:  04/22/17 0156    Specimen:  Blood Updated:  04/22/17 0253     TSH 3.260 mIU/mL     Comprehensive Metabolic Panel [[id number]]  (Abnormal) Collected:  04/22/17 0156     Specimen:  Blood Updated:  04/22/17 0258     Glucose 208 (H) mg/dL      BUN 15 mg/dL      Creatinine 0.86 mg/dL      Sodium 136 (L) mmol/L      Potassium 3.9 mmol/L      Chloride 101 mmol/L      CO2 25.0 mmol/L      Calcium 8.3 (L) mg/dL      Total Protein 7.1 g/dL      Albumin 3.60 g/dL      ALT (SGPT) 35 U/L      AST (SGOT) 34 U/L      Alkaline Phosphatase 59 U/L      Total Bilirubin 0.4 mg/dL      eGFR Non African Amer 100 mL/min/1.73      Globulin 3.5 gm/dL      A/G Ratio 1.0 (L) g/dL      BUN/Creatinine Ratio 17.4     Anion Gap 10.0 mmol/L     [place] Draw [[id number]] Collected:  04/22/17 0156    Specimen:  Blood Updated:  04/22/17 0301    Narrative:       The following orders were created for panel order [place] Draw.  Procedure                               Abnormality         Status                     ---------                               -----------         ------                     Light Blue Top[[id number]]                                    Final result               Green Top (Gel)[[id number]]                                   Final result               Lavender Top[[id number]]                                      Final result               Gold Top - SST[[id number]]                                    Final result                 Please view results for these tests on the individual orders.    Light Blue Top [[id number]] Collected:  04/22/17 0159    Specimen:  Blood Updated:  04/22/17 0301     Extra Tube hold for add-on      Auto resulted       Green Top (Gel) [[id number]] Collected:  04/22/17 0156    Specimen:  Blood Updated:  04/22/17 0301     Extra Tube Hold for add-ons.      Auto resulted.       Lavender Top [[id number]] Collected:  04/22/17 0156    Specimen:  Blood Updated:  04/22/17 0301     Extra Tube hold for add-on      Auto resulted       Gold Top - SST [[id number]] Collected:  04/22/17 0156    Specimen:  Blood Updated:  04/22/17 0301     Extra Tube Hold for add-ons.      Auto resulted.           Imaging  Results (last 24 hours)     Procedure Component Value Units Date/Time    XR Chest 1 View [[id number]] Collected:  04/22/17 0202     Updated:  04/22/17 0240    Narrative:       Exam: AP portable chest    INDICATION: Chest pain    COMPARISON: 4/7/2017    FINDINGS: AP portable chest. The bony structures are intact.  Heart size upper limits normal. Lungs are clear. No pneumothorax  or pleural effusion.      Impression:       No acute cardiopulmonary abnormality.    Electronically signed by:  Brennan Corrigan MD  4/22/2017 2:39 AM  CDT Workstation: [id number]    CT Angiogram Chest With Contrast [[id number]] Collected:  04/22/17 0253     Updated:  04/22/17 0346    Narrative:       Exam: CT angiogram of the chest with contrast    INDICATION: Shortness of breath    Technical: Routine CT angiogram the chest with contrast. Computer  generated 3-D reconstruction/MIPS were obtained. Images are  suboptimal due to suboptimal opacification of the pulmonary  arterial tree and body habitus. CT technique performed using  ALARA.    COMPARISON: 3/27/2017    FINDINGS: No mediastinal hilar adenopathy. Mediastinal  lipomatosis is present. No aortic aneurysm or dissection. Limited  evaluation pulmonary arterial tree. Difficult to evaluate the  segmental branches. There is a filling defect present in the  right upper lobe segmental branch image 47 not seen on the  previous study. Heart is enlarged. Lungs are clear. No  pneumothorax or pleural effusion.    Imaged upper abdomen is is unremarkable.    The bony structures are intact.      Impression:       1. Suboptimal exam.  2. There is at least one new filling defect present in the right  upper lobe pulmonary artery branch compatible with an acute  pulmonary embolus.    Findings discussed with Dr. Mayen on 4/21/2017 at 3:43 AM    Electronically signed by:  Brennan Corrigan MD  4/22/2017 3:45 AM  CDT Workstation: [employer]            Assessment:    Hospital Problem List     Chest pain     Pulmonary embolism                Plan:  Admit  To telemtry   lovenox sub q   Echo   Us doppler lower ext   Morphine sulfate for pain   Oxygen   Continue the rest of his medication   Have to decide if it is  a failure of the xarelto or  Was the patient not compliant with the medicine       I discussed the patients findings and my recommendations with: patient     Connie Tapia MD  04/22/17  4:42 AM

## 2017-04-22 NOTE — ED PROVIDER NOTES
Subjective   HPI Comments: Patient came complaining of increasing shortness of breath.  He was also having chest pain.    The shortness of breath and chest pain have been progressing for several days    He had been told that he had blood clots in his lungs.  He is taking Ranexa and Xarelto.     He has seen Dr. Briceno any past.    Past medical history hypertension and asthma chest pain chronic back pain GERD morbid obesity restless leg syndrome seizure pulmonary embolus.  Diabetes COPD    He used to smoke but not anymore    Past surgical history: Cardiac catheterization, transesophageal echocardiograms      History provided by:  Patient      Review of Systems   Respiratory: Positive for chest tightness and shortness of breath.    Cardiovascular: Positive for chest pain and leg swelling.       Past Medical History:   Diagnosis Date   • Abdominal pain    • Acute right otitis media    • Allergic rhinitis    • Asthma    • Backache    • Bronchitis    • Cellulitis of lower leg     Right   • Chest pain    • Chronic back pain    • Chronic pain    • Cough    • Diarrhea    • Dyspnea    • GERD (gastroesophageal reflux disease)    • Headache    • Hip pain    • Hypertension    • Insomnia    • Low back pain    • Lumbosacral strain    • Morbid obesity    • Pulmonary embolism 03/24/2017   • RLS (restless legs syndrome)    • Seizures        Allergies   Allergen Reactions   • Glipizide      Slurred speech     • Phenergan [Promethazine Hcl]      Burning veins   • Risperdal [Risperidone]      Slurred speech   • Tramadol      seizures       Past Surgical History:   Procedure Laterality Date   • CARDIAC CATHETERIZATION      LAD stent   • CARDIAC CATHETERIZATION N/A 3/15/2017    Procedure: Left Heart Cath;  Surgeon: Edwige Briceno MD;  Location: [facility] CATH INVASIVE LOCATION;  Service:    • CARDIAC CATHETERIZATION N/A 3/15/2017    Procedure: Stent SOPHIA coronary;  Surgeon: Edwige Briceno MD;  Location: [facility] CATH INVASIVE LOCATION;  Service:     • AK RT/LT HEART CATHETERS N/A 3/15/2017    Procedure: Percutaneous Coronary Intervention;  Surgeon: Edwige Briceno MD;  Location: [facility] CATH INVASIVE LOCATION;  Service: Cardiovascular   • TRANSESOPHAGEAL ECHOCARDIOGRAM (MONICA)      With color flow       Family History   Problem Relation Age of Onset   • Cancer Other    • Coronary artery disease Other    • Diabetes Other    • Heart disease Other    • Hypertension Other        Social History     Social History   • Marital status:      Spouse name: N/A   • Number of children: N/A   • Years of education: N/A     Social History Main Topics   • Smoking status: Former Smoker   • Smokeless tobacco: Current User     Types: Snuff   • Alcohol use No   • Drug use: No   • Sexual activity: Defer     Other Topics Concern   • None     Social History Narrative           Objective   Physical Exam   Constitutional: He is oriented to person, place, and time. He appears well-developed and well-nourished.   HENT:   Head: Normocephalic and atraumatic.   Nose: Nose normal.   Mouth/Throat: Oropharynx is clear and moist.   Eyes: Conjunctivae and EOM are normal. Pupils are equal, round, and reactive to light.   Neck: Normal range of motion. Neck supple.   Cardiovascular: Normal rate, regular rhythm, normal heart sounds and intact distal pulses.    Pulmonary/Chest: Effort normal and breath sounds normal.   Abdominal: Soft. Bowel sounds are normal.   Musculoskeletal: Normal range of motion.   Neurological: He is alert and oriented to person, place, and time.   Skin: Skin is warm and dry.   Psychiatric: He has a normal mood and affect. His behavior is normal. Judgment and thought content normal.   Nursing note and vitals reviewed.      Procedures         ED Course  ED Course      Labs Reviewed   CK - Abnormal; Notable for the following:        Result Value    Creatine Kinase 345 (*)     All other components within normal limits   URINALYSIS W/ CULTURE IF INDICATED - Abnormal; Notable for  the following:     Glucose, UA >=1000 mg/dL (3+) (*)     All other components within normal limits    Narrative:     Urine microscopic not indicated.   COMPREHENSIVE METABOLIC PANEL - Abnormal; Notable for the following:     Glucose 208 (*)     Sodium 136 (*)     Calcium 8.3 (*)     A/G Ratio 1.0 (*)     All other components within normal limits   PROTIME-INR - Abnormal; Notable for the following:     Protime 16.3 (*)     INR 1.31 (*)     All other components within normal limits    Narrative:     Therapeutic range for most indications is 2.0-3.0 INR,  or 2.5-3.5 for mechanical heart valves.   CBC WITH AUTO DIFFERENTIAL - Abnormal; Notable for the following:     Hemoglobin 12.4 (*)     Hematocrit 36.0 (*)     RDW 14.7 (*)     Immature Grans % 0.7 (*)     Immature Grans, Absolute 0.05 (*)     All other components within normal limits   BLOOD GAS, ARTERIAL - Abnormal; Notable for the following:     pO2, Arterial 77.9 (*)     Hemoglobin, Blood Gas 13.3 (*)     Hematocrit, Blood Gas 39.0 (*)     CO2 Content 27.4 (*)     Sodium, Arterial 136.7 (*)     All other components within normal limits   TROPONIN (IN-HOUSE) - Normal   CK MB - Normal   BNP (IN-HOUSE) - Normal   LIPASE - Normal   TSH - Normal   MAGNESIUM - Normal   APTT - Normal    Narrative:     The recommended Heparin therapeutic range is 68-97 seconds.   D-DIMER, QUANTITATIVE - Normal    Narrative:     Dimer values <500 ng/ml FEU are FDA approved as aid in diagnosis of deep venous thrombosis and pulmonary embolism.  This test should not be used in an exclusion strategy with pretest probability alone.    A recent guideline regarding diagnosis for pulmonary thomboembolism recommends an adjusted exclusion criterion of age x 10 ng/ml FEU for patients >50 years of age (Lori Intern Med 2015; 163: 701-711).   RAINBOW DRAW    Narrative:     The following orders were created for panel order [place] Draw.  Procedure                               Abnormality         Status                      ---------                               -----------         ------                     Light Blue Top[[id number]]                                    Final result               Green Top (Gel)[[id number]]                                   Final result               Lavender Top[[id number]]                                      Final result               Gold Top - SST[[id number]]                                    Final result                 Please view results for these tests on the individual orders.   TROPONIN (IN-HOUSE)   TROPONIN (IN-HOUSE)   CK   CK   CK MB   CK MB   CBC AND DIFFERENTIAL    Narrative:     The following orders were created for panel order CBC & Differential.  Procedure                               Abnormality         Status                     ---------                               -----------         ------                     CBC Auto Differential[[id number]]         Abnormal            Final result                 Please view results for these tests on the individual orders.   LIGHT BLUE TOP   GREEN TOP   LAVENDER TOP   GOLD TOP - SST        CT Angiogram Chest With Contrast   Final Result   1. Suboptimal exam.   2. There is at least one new filling defect present in the right   upper lobe pulmonary artery branch compatible with an acute   pulmonary embolus.      Findings discussed with Dr. Mayen on 4/21/2017 at 3:43 AM      Electronically signed by:  Brennan Corrigan MD  4/22/2017 3:45 AM   CDT Workstation: bead Button      XR Chest 1 View   Final Result   No acute cardiopulmonary abnormality.      Electronically signed by:  Brennan Corrigan MD  4/22/2017 2:39 AM   WooWho[employer] Workstation: bead Button        Discussed with Dr. Tapia who accepted patient for admission            MDM    Final diagnoses:   Chest pain, unspecified type   Shortness of breath   Other pulmonary embolism with acute cor pulmonale, unspecified chronicity   Morbid obesity, unspecified obesity type             Deven Mayen MD  04/22/17 0530           Deven Mayen MD  05/18/17 0609

## 2017-04-22 NOTE — PLAN OF CARE
Problem: Patient Care Overview (Adult)  Goal: Plan of Care Review  Outcome: Ongoing (interventions implemented as appropriate)    04/22/17 1744   Coping/Psychosocial Response Interventions   Plan Of Care Reviewed With patient;spouse   Patient Care Overview   Progress improving          Goal: Adult Individualization and Mutuality  Outcome: Ongoing (interventions implemented as appropriate)    04/22/17 1744   Mutuality/Individual Preferences   What Anxieties, Fears or Concerns Do You Have About Your Health or Care? Patient is concerned that he will go home taking a dose of xarelto that is not therapeutic for him and develop another clot after discharge.       Goal: Discharge Needs Assessment  Outcome: Ongoing (interventions implemented as appropriate)    04/22/17 1744   Discharge Needs Assessment   Concerns To Be Addressed medication concerns   Concerns Comments Patient is concerned that he will not be prescribed the correct dose of xarelto and will develop another clot after discharge,   Readmission Within The Last 30 Days previous discharge plan unsuccessful   Living Environment   Transportation Available car;family or friend will provide         Problem: Pain, Acute (Adult)  Goal: Identify Related Risk Factors and Signs and Symptoms  Outcome: Ongoing (interventions implemented as appropriate)  Goal: Acceptable Pain Control/Comfort Level  Outcome: Ongoing (interventions implemented as appropriate)    Problem: Respiratory Insufficiency (Adult)  Goal: Identify Related Risk Factors and Signs and Symptoms  Outcome: Ongoing (interventions implemented as appropriate)  Goal: Acid/Base Balance  Outcome: Ongoing (interventions implemented as appropriate)  Goal: Effective Ventilation  Outcome: Ongoing (interventions implemented as appropriate)

## 2017-04-22 NOTE — PROGRESS NOTES
[facility] Medicine Services  INPATIENT PROGRESS NOTE    Length of Stay: 0  Date of Admission: 4/22/2017  Primary Care Physician: Edwige Briceno MD    Subjective   Subjective    Feels SOA.  Has right side chest pain    Review of Systems   Constitutional: Negative for activity change.   Respiratory: Positive for shortness of breath.    Cardiovascular: Positive for chest pain.   Gastrointestinal: Negative for diarrhea.        All pertinent negatives and positives are as above. All other systems have been reviewed and are negative unless otherwise stated.     Objective    Temp:  [97.3 °F (36.3 °C)-98.7 °F (37.1 °C)] 98.6 °F (37 °C)  Heart Rate:  [68-78] 69  Resp:  [18-26] 20  BP: (112-230)/(54-99) 112/54  Physical Exam   Constitutional: He appears well-developed and well-nourished. No distress.   HENT:   Head: Normocephalic and atraumatic.   Pulmonary/Chest: Effort normal. He has no wheezes. He has no rales.   Abdominal: Soft. He exhibits no distension. There is no tenderness.   Musculoskeletal: Normal range of motion.   Neurological: He is alert.   Skin: He is not diaphoretic.   Psychiatric: He has a normal mood and affect. His behavior is normal. Judgment and thought content normal.   Vitals reviewed.          Results Review:  I have reviewed the labs, radiology results, and diagnostic studies.    Laboratory Data:   Lab Results (last 24 hours)     Procedure Component Value Units Date/Time    CBC & Differential [[id number]] Collected:  04/22/17 0156    Specimen:  Blood Updated:  04/22/17 0212    Narrative:       The following orders were created for panel order CBC & Differential.  Procedure                               Abnormality         Status                     ---------                               -----------         ------                     CBC Auto Differential[[id number]]         Abnormal            Final result                 Please view results for these tests on  the individual orders.    CBC Auto Differential [[id number]]  (Abnormal) Collected:  04/22/17 0156    Specimen:  Blood Updated:  04/22/17 0212     WBC 7.06 10*3/mm3      RBC 4.42 10*6/mm3      Hemoglobin 12.4 (L) g/dL      Hematocrit 36.0 (L) %      MCV 81.4 fL      MCH 28.1 pg      MCHC 34.4 g/dL      RDW 14.7 (H) %      RDW-SD 43.9 fl      MPV 8.6 fL      Platelets 183 10*3/mm3      Neutrophil % 63.6 %      Lymphocyte % 24.1 %      Monocyte % 7.6 %      Eosinophil % 3.7 %      Basophil % 0.3 %      Immature Grans % 0.7 (H) %      Neutrophils, Absolute 4.49 10*3/mm3      Lymphocytes, Absolute 1.70 10*3/mm3      Monocytes, Absolute 0.54 10*3/mm3      Eosinophils, Absolute 0.26 10*3/mm3      Basophils, Absolute 0.02 10*3/mm3      Immature Grans, Absolute 0.05 (H) 10*3/mm3     CK [[id number]]  (Abnormal) Collected:  04/22/17 0156    Specimen:  Blood Updated:  04/22/17 0225     Creatine Kinase 345 (H) U/L     Lipase [[id number]]  (Normal) Collected:  04/22/17 0156    Specimen:  Blood Updated:  04/22/17 0225     Lipase 82 U/L     Magnesium [[id number]]  (Normal) Collected:  04/22/17 0156    Specimen:  Blood Updated:  04/22/17 0225     Magnesium 1.9 mg/dL     Protime-INR [[id number]]  (Abnormal) Collected:  04/22/17 0159    Specimen:  Blood Updated:  04/22/17 0233     Protime 16.3 (H) Seconds      INR 1.31 (H)    Narrative:       Therapeutic range for most indications is 2.0-3.0 INR,  or 2.5-3.5 for mechanical heart valves.    aPTT [[id number]]  (Normal) Collected:  04/22/17 0159    Specimen:  Blood Updated:  04/22/17 0233     PTT 33.2 seconds     Narrative:       The recommended Heparin therapeutic range is 68-97 seconds.    CK-MB [[id number]]  (Normal) Collected:  04/22/17 0156    Specimen:  Blood Updated:  04/22/17 0234     CKMB 2.84 ng/mL     D-dimer, Quantitative [[id number]]  (Normal) Collected:  04/22/17 0159    Specimen:  Blood Updated:  04/22/17 0234     D-Dimer, Quantitative <270 ng/mL (FEU)     Narrative:       Dimer values  <500 ng/ml FEU are FDA approved as aid in diagnosis of deep venous thrombosis and pulmonary embolism.  This test should not be used in an exclusion strategy with pretest probability alone.    A recent guideline regarding diagnosis for pulmonary thomboembolism recommends an adjusted exclusion criterion of age x 10 ng/ml FEU for patients >50 years of age (Lori Intern Med 2015; 163: 701-711).    BNP [[id number]]  (Normal) Collected:  04/22/17 0156    Specimen:  Blood Updated:  04/22/17 0235     proBNP 95.9 pg/mL     Troponin [[id number]]  (Normal) Collected:  04/22/17 0156    Specimen:  Blood Updated:  04/22/17 0240     Troponin I <0.012 ng/mL     Blood Gas, Arterial [[id number]]  (Abnormal) Collected:  04/22/17 0237    Specimen:  Arterial Blood Updated:  04/22/17 0243     Site --      Not performed at this site.        Cory's Test --      Not performed at this site.        pH, Arterial 7.396 pH units      pCO2, Arterial 43.4 mm Hg      pO2, Arterial 77.9 (L) mm Hg      HCO3, Arterial 26.0 mmol/L      Base Excess, Arterial 0.9 mmol/L      O2 Saturation, Arterial 95.2 %      Hemoglobin, Blood Gas 13.3 (L) g/dL      Hematocrit, Blood Gas 39.0 (L) %      CO2 Content 27.4 (H)     Sodium, Arterial 136.7 (L) mmol/L      Potassium, Arterial 3.88 mmol/L      Glucose, Arterial 213 mmol/L      Barometric Pressure for Blood Gas -- mmHg       Not performed at this site.        Modality --      Not performed at this site.        Ionized Calcium 4.7 mg/dL     Urinalysis With / Culture If Indicated [[id number]]  (Abnormal) Collected:  04/22/17 0241    Specimen:  Urine from Urine, Clean Catch Updated:  04/22/17 0248     Color, UA Yellow     Appearance, UA Clear     pH, UA 6.0     Specific Gravity, UA 1.018     Glucose, UA >=1000 mg/dL (3+) (A)     Ketones, UA Negative     Bilirubin, UA Negative     Blood, UA Negative     Protein, UA Negative     Leuk Esterase, UA Negative     Nitrite, UA Negative     Urobilinogen, UA 1.0 E.U./dL    Narrative:        Urine microscopic not indicated.    TSH [[id number]]  (Normal) Collected:  04/22/17 0156    Specimen:  Blood Updated:  04/22/17 0253     TSH 3.260 mIU/mL     Comprehensive Metabolic Panel [[id number]]  (Abnormal) Collected:  04/22/17 0156    Specimen:  Blood Updated:  04/22/17 0258     Glucose 208 (H) mg/dL      BUN 15 mg/dL      Creatinine 0.86 mg/dL      Sodium 136 (L) mmol/L      Potassium 3.9 mmol/L      Chloride 101 mmol/L      CO2 25.0 mmol/L      Calcium 8.3 (L) mg/dL      Total Protein 7.1 g/dL      Albumin 3.60 g/dL      ALT (SGPT) 35 U/L      AST (SGOT) 34 U/L      Alkaline Phosphatase 59 U/L      Total Bilirubin 0.4 mg/dL      eGFR Non African Amer 100 mL/min/1.73      Globulin 3.5 gm/dL      A/G Ratio 1.0 (L) g/dL      BUN/Creatinine Ratio 17.4     Anion Gap 10.0 mmol/L     [place] Draw [[id number]] Collected:  04/22/17 0156    Specimen:  Blood Updated:  04/22/17 0301    Narrative:       The following orders were created for panel order [place] Draw.  Procedure                               Abnormality         Status                     ---------                               -----------         ------                     Light Blue Top[[id number]]                                    Final result               Green Top (Gel)[[id number]]                                   Final result               Lavender Top[[id number]]                                      Final result               Gold Top - SST[[id number]]                                    Final result                 Please view results for these tests on the individual orders.    Light Blue Top [[id number]] Collected:  04/22/17 0159    Specimen:  Blood Updated:  04/22/17 0301     Extra Tube hold for add-on      Auto resulted       Green Top (Gel) [[id number]] Collected:  04/22/17 0156    Specimen:  Blood Updated:  04/22/17 0301     Extra Tube Hold for add-ons.      Auto resulted.       Lavender Top [[id number]] Collected:  04/22/17 0156    Specimen:  Blood  Updated:  04/22/17 0301     Extra Tube hold for add-on      Auto resulted       Gold Top - SST [[id number]] Collected:  04/22/17 0156    Specimen:  Blood Updated:  04/22/17 0301     Extra Tube Hold for add-ons.      Auto resulted.       CK [[id number]]  (Abnormal) Collected:  04/22/17 0450    Specimen:  Blood Updated:  04/22/17 0617     Creatine Kinase 310 (H) U/L     Troponin [[id number]]  (Normal) Collected:  04/22/17 0450    Specimen:  Blood Updated:  04/22/17 0630     Troponin I <0.012 ng/mL     CK-MB [[id number]]  (Normal) Collected:  04/22/17 0450    Specimen:  Blood Updated:  04/22/17 0630     CKMB 2.47 ng/mL     POC Glucose Fingerstick [[id number]]  (Abnormal) Collected:  04/22/17 0546    Specimen:  Blood Updated:  04/22/17 0655     Glucose 165 (H) mg/dL       Meter: [id number]: [id number] [email or web address] LULI             Culture Data:   No results found for: BLOODCX  No results found for: URINECX  No results found for: RESPCX  No results found for: WOUNDCX  No results found for: STOOLCX  No components found for: BODYFLD    Radiology Data:   Imaging Results (last 24 hours)     Procedure Component Value Units Date/Time    XR Chest 1 View [[id number]] Collected:  04/22/17 0202     Updated:  04/22/17 0240    Narrative:       Exam: AP portable chest    INDICATION: Chest pain    COMPARISON: 4/7/2017    FINDINGS: AP portable chest. The bony structures are intact.  Heart size upper limits normal. Lungs are clear. No pneumothorax  or pleural effusion.      Impression:       No acute cardiopulmonary abnormality.    Electronically signed by:  Brennan Corrigan MD  4/22/2017 2:39 AM  CDT Workstation: [id number]    CT Angiogram Chest With Contrast [[id number]] Collected:  04/22/17 0253     Updated:  04/22/17 0346    Narrative:       Exam: CT angiogram of the chest with contrast    INDICATION: Shortness of breath    Technical: Routine CT angiogram the chest with contrast. Computer  generated 3-D reconstruction/MIPS were  obtained. Images are  suboptimal due to suboptimal opacification of the pulmonary  arterial tree and body habitus. CT technique performed using  ALARA.    COMPARISON: 3/27/2017    FINDINGS: No mediastinal hilar adenopathy. Mediastinal  lipomatosis is present. No aortic aneurysm or dissection. Limited  evaluation pulmonary arterial tree. Difficult to evaluate the  segmental branches. There is a filling defect present in the  right upper lobe segmental branch image 47 not seen on the  previous study. Heart is enlarged. Lungs are clear. No  pneumothorax or pleural effusion.    Imaged upper abdomen is is unremarkable.    The bony structures are intact.      Impression:       1. Suboptimal exam.  2. There is at least one new filling defect present in the right  upper lobe pulmonary artery branch compatible with an acute  pulmonary embolus.    Findings discussed with Dr. Mayen on 4/21/2017 at 3:43 AM    Electronically signed by:  Brennan Corrigan MD  4/22/2017 3:45 AM  CDT Workstation: [id number]    US Venous Doppler Lower Extremity Bilateral (duplex) [[id number]] Collected:  04/22/17 1224     Updated:  04/22/17 1439    Narrative:       .  EXAMINATION:  Ultrasound, venous, lower extremity    CLINICAL INDICATION / HISTORY:  Dyspnea, lower extremity swelling    COMPARISON:  none  TECHNIQUE:  Lateral lower extremity(ies)                          serial axial graded compression technique                          grayscale, color flow, spectral and  Doppler     FINDINGS:    Imaged vessels:    - common femoral vein (CFV)    - deep femoral vein (FV) (formally called superficial femoral  vein (SFV))    - profunda femoral vein (PFV) (cephalad portion)    - popliteal vein (PV)    - posterior tibial vein (PTV)    - greater saphenous vein (GSV) (non-contiguous segments)    Unless otherwise indicated, all of the above described vessels  were freely compressible and demonstrated spontaneous and phasic  flow as well as appropriate  flow with augmentation.    .    Impression:       CONCLUSION:  1. Negative examination - no evidence of deep venous thrombosis  within the femoral-popliteal system of the bilateral lower  extremity(ies).        Electronically signed by:  MIGUEL A Soni MD  4/22/2017 2:37  PM CDT Workstation: [id number]          I have reviewed the patient current medications.     Assessment/Plan     Hospital Problem List     Chest pain    Pulmonary embolism          Right side multiple pulmonary emboli - continue xarelto.  Supplemental oxygen.  Echo results pending  DM II - sliding scale insulin  HTN - lisinopril  CAD - pravastatin              Discharge Planning: I expect patient to be discharged to home in 1-2 days.    Ronaldo Osorio MD   04/22/17   2:43 PM

## 2017-04-23 LAB
ANION GAP SERPL CALCULATED.3IONS-SCNC: 12 MMOL/L (ref 5–15)
BASOPHILS # BLD AUTO: 0.01 10*3/MM3 (ref 0–0.2)
BASOPHILS NFR BLD AUTO: 0.1 % (ref 0–2)
BUN BLD-MCNC: 12 MG/DL (ref 7–21)
BUN/CREAT SERPL: 15.6 (ref 7–25)
CALCIUM SPEC-SCNC: 8.5 MG/DL (ref 8.4–10.2)
CHLORIDE SERPL-SCNC: 100 MMOL/L (ref 95–110)
CO2 SERPL-SCNC: 24 MMOL/L (ref 22–31)
CREAT BLD-MCNC: 0.77 MG/DL (ref 0.7–1.3)
DEPRECATED RDW RBC AUTO: 45.6 FL (ref 35.1–43.9)
EOSINOPHIL # BLD AUTO: 0.29 10*3/MM3 (ref 0–0.7)
EOSINOPHIL NFR BLD AUTO: 4.1 % (ref 0–7)
ERYTHROCYTE [DISTWIDTH] IN BLOOD BY AUTOMATED COUNT: 15.1 % (ref 11.5–14.5)
GFR SERPL CREATININE-BSD FRML MDRD: 113 ML/MIN/1.73 (ref 70–162)
GLUCOSE BLD-MCNC: 235 MG/DL (ref 60–100)
GLUCOSE BLDC GLUCOMTR-MCNC: 124 MG/DL (ref 70–130)
GLUCOSE BLDC GLUCOMTR-MCNC: 158 MG/DL (ref 70–130)
GLUCOSE BLDC GLUCOMTR-MCNC: 166 MG/DL (ref 70–130)
GLUCOSE BLDC GLUCOMTR-MCNC: 188 MG/DL (ref 70–130)
GLUCOSE BLDC GLUCOMTR-MCNC: 207 MG/DL (ref 70–130)
GLUCOSE BLDC GLUCOMTR-MCNC: 237 MG/DL (ref 70–130)
HCT VFR BLD AUTO: 37.1 % (ref 39–49)
HGB BLD-MCNC: 12.5 G/DL (ref 13.7–17.3)
IMM GRANULOCYTES # BLD: 0.09 10*3/MM3 (ref 0–0.02)
IMM GRANULOCYTES NFR BLD: 1.3 % (ref 0–0.5)
LYMPHOCYTES # BLD AUTO: 1.39 10*3/MM3 (ref 0.6–4.2)
LYMPHOCYTES NFR BLD AUTO: 19.5 % (ref 10–50)
MCH RBC QN AUTO: 27.9 PG (ref 26.5–34)
MCHC RBC AUTO-ENTMCNC: 33.7 G/DL (ref 31.5–36.3)
MCV RBC AUTO: 82.8 FL (ref 80–98)
MONOCYTES # BLD AUTO: 0.55 10*3/MM3 (ref 0–0.9)
MONOCYTES NFR BLD AUTO: 7.7 % (ref 0–12)
NEUTROPHILS # BLD AUTO: 4.78 10*3/MM3 (ref 2–8.6)
NEUTROPHILS NFR BLD AUTO: 67.3 % (ref 37–80)
PLATELET # BLD AUTO: 185 10*3/MM3 (ref 150–450)
PMV BLD AUTO: 8.9 FL (ref 8–12)
POTASSIUM BLD-SCNC: 4 MMOL/L (ref 3.5–5.1)
RBC # BLD AUTO: 4.48 10*6/MM3 (ref 4.37–5.74)
SODIUM BLD-SCNC: 136 MMOL/L (ref 137–145)
WBC NRBC COR # BLD: 7.11 10*3/MM3 (ref 3.2–9.8)

## 2017-04-23 PROCEDURE — 94760 N-INVAS EAR/PLS OXIMETRY 1: CPT

## 2017-04-23 PROCEDURE — 94799 UNLISTED PULMONARY SVC/PX: CPT

## 2017-04-23 PROCEDURE — 80048 BASIC METABOLIC PNL TOTAL CA: CPT | Performed by: FAMILY MEDICINE

## 2017-04-23 PROCEDURE — 82962 GLUCOSE BLOOD TEST: CPT

## 2017-04-23 PROCEDURE — 25010000002 MORPHINE PER 10 MG: Performed by: FAMILY MEDICINE

## 2017-04-23 PROCEDURE — 63710000001 INSULIN ASPART PER 5 UNITS: Performed by: INTERNAL MEDICINE

## 2017-04-23 PROCEDURE — 85025 COMPLETE CBC W/AUTO DIFF WBC: CPT | Performed by: FAMILY MEDICINE

## 2017-04-23 PROCEDURE — 63710000001 DIPHENHYDRAMINE PER 50 MG: Performed by: FAMILY MEDICINE

## 2017-04-23 RX ORDER — BUDESONIDE 0.5 MG/2ML
0.5 INHALANT ORAL
Status: DISCONTINUED | OUTPATIENT
Start: 2017-04-23 | End: 2017-04-25 | Stop reason: HOSPADM

## 2017-04-23 RX ORDER — DIPHENHYDRAMINE HCL 25 MG
25 TABLET ORAL ONCE
Status: DISCONTINUED | OUTPATIENT
Start: 2017-04-23 | End: 2017-04-23

## 2017-04-23 RX ORDER — MORPHINE SULFATE 4 MG/ML
4 INJECTION, SOLUTION INTRAMUSCULAR; INTRAVENOUS
Status: DISCONTINUED | OUTPATIENT
Start: 2017-04-23 | End: 2017-04-25 | Stop reason: HOSPADM

## 2017-04-23 RX ORDER — DIPHENHYDRAMINE HCL 25 MG
25 CAPSULE ORAL ONCE
Status: COMPLETED | OUTPATIENT
Start: 2017-04-23 | End: 2017-04-23

## 2017-04-23 RX ADMIN — SUCRALFATE 1 G: 1 TABLET ORAL at 13:05

## 2017-04-23 RX ADMIN — INSULIN ASPART 3 UNITS: 100 INJECTION, SOLUTION INTRAVENOUS; SUBCUTANEOUS at 20:21

## 2017-04-23 RX ADMIN — PRAMIPEXOLE DIHYDROCHLORIDE 0.25 MG: 0.25 TABLET ORAL at 20:18

## 2017-04-23 RX ADMIN — ASPIRIN 81 MG: 81 TABLET, CHEWABLE ORAL at 08:19

## 2017-04-23 RX ADMIN — AMLODIPINE BESYLATE 10 MG: 10 TABLET ORAL at 08:19

## 2017-04-23 RX ADMIN — PRASUGREL HYDROCHLORIDE 10 MG: 10 TABLET, FILM COATED ORAL at 08:19

## 2017-04-23 RX ADMIN — LISINOPRIL 40 MG: 40 TABLET ORAL at 08:20

## 2017-04-23 RX ADMIN — MORPHINE SULFATE 4 MG: 4 INJECTION, SOLUTION INTRAMUSCULAR; INTRAVENOUS at 13:04

## 2017-04-23 RX ADMIN — DIPHENHYDRAMINE HYDROCHLORIDE 25 MG: 25 CAPSULE ORAL at 18:28

## 2017-04-23 RX ADMIN — MORPHINE SULFATE 4 MG: 4 INJECTION, SOLUTION INTRAMUSCULAR; INTRAVENOUS at 20:18

## 2017-04-23 RX ADMIN — Medication 10 ML: at 08:21

## 2017-04-23 RX ADMIN — SUCRALFATE 1 G: 1 TABLET ORAL at 08:19

## 2017-04-23 RX ADMIN — PANTOPRAZOLE SODIUM 40 MG: 40 TABLET, DELAYED RELEASE ORAL at 08:20

## 2017-04-23 RX ADMIN — Medication 10 ML: at 13:03

## 2017-04-23 RX ADMIN — IPRATROPIUM BROMIDE AND ALBUTEROL SULFATE 3 ML: 2.5; .5 SOLUTION RESPIRATORY (INHALATION) at 10:56

## 2017-04-23 RX ADMIN — LORAZEPAM 1 MG: 1 TABLET ORAL at 14:52

## 2017-04-23 RX ADMIN — BUDESONIDE 0.5 MG: 0.5 INHALANT RESPIRATORY (INHALATION) at 11:00

## 2017-04-23 RX ADMIN — ISOSORBIDE MONONITRATE 120 MG: 60 TABLET, EXTENDED RELEASE ORAL at 08:19

## 2017-04-23 RX ADMIN — TRAZODONE HYDROCHLORIDE 300 MG: 150 TABLET ORAL at 20:18

## 2017-04-23 RX ADMIN — METOPROLOL SUCCINATE 100 MG: 100 TABLET, EXTENDED RELEASE ORAL at 08:19

## 2017-04-23 RX ADMIN — RIVAROXABAN 15 MG: 15 TABLET, FILM COATED ORAL at 08:20

## 2017-04-23 RX ADMIN — INSULIN ASPART 5 UNITS: 100 INJECTION, SOLUTION INTRAVENOUS; SUBCUTANEOUS at 08:20

## 2017-04-23 RX ADMIN — RANOLAZINE 1000 MG: 500 TABLET, FILM COATED, EXTENDED RELEASE ORAL at 20:18

## 2017-04-23 RX ADMIN — IPRATROPIUM BROMIDE AND ALBUTEROL SULFATE 3 ML: 2.5; .5 SOLUTION RESPIRATORY (INHALATION) at 20:28

## 2017-04-23 RX ADMIN — PRAVASTATIN SODIUM 80 MG: 40 TABLET ORAL at 20:18

## 2017-04-23 RX ADMIN — IPRATROPIUM BROMIDE AND ALBUTEROL SULFATE 3 ML: 2.5; .5 SOLUTION RESPIRATORY (INHALATION) at 07:37

## 2017-04-23 RX ADMIN — Medication 10 ML: at 17:42

## 2017-04-23 RX ADMIN — MORPHINE SULFATE 4 MG: 4 INJECTION, SOLUTION INTRAMUSCULAR; INTRAVENOUS at 08:20

## 2017-04-23 RX ADMIN — MORPHINE SULFATE 4 MG: 4 INJECTION, SOLUTION INTRAMUSCULAR; INTRAVENOUS at 17:17

## 2017-04-23 RX ADMIN — RANOLAZINE 1000 MG: 500 TABLET, FILM COATED, EXTENDED RELEASE ORAL at 08:19

## 2017-04-23 RX ADMIN — IPRATROPIUM BROMIDE AND ALBUTEROL SULFATE 3 ML: 2.5; .5 SOLUTION RESPIRATORY (INHALATION) at 15:01

## 2017-04-23 RX ADMIN — LORAZEPAM 1 MG: 1 TABLET ORAL at 20:18

## 2017-04-23 RX ADMIN — RIVAROXABAN 15 MG: 15 TABLET, FILM COATED ORAL at 17:43

## 2017-04-23 RX ADMIN — BUDESONIDE 0.5 MG: 0.5 INHALANT RESPIRATORY (INHALATION) at 20:28

## 2017-04-23 RX ADMIN — INSULIN ASPART 5 UNITS: 100 INJECTION, SOLUTION INTRAVENOUS; SUBCUTANEOUS at 13:03

## 2017-04-23 RX ADMIN — SUCRALFATE 1 G: 1 TABLET ORAL at 20:18

## 2017-04-23 RX ADMIN — SUCRALFATE 1 G: 1 TABLET ORAL at 17:43

## 2017-04-23 NOTE — PLAN OF CARE
Problem: Patient Care Overview (Adult)  Goal: Plan of Care Review  Outcome: Ongoing (interventions implemented as appropriate)  Making Lifestyle Changes for a Healthier Weight used to provide ed regarding Wt Loss diet and diet copy given.    04/22/17 1900   Coping/Psychosocial Response Interventions   Plan Of Care Reviewed With patient   Patient Care Overview   Progress no change   Outcome Evaluation   Outcome Summary/Follow up Plan initial assessment

## 2017-04-23 NOTE — PLAN OF CARE
Problem: Patient Care Overview (Adult)  Goal: Plan of Care Review  Outcome: Ongoing (interventions implemented as appropriate)    04/23/17 0700 04/23/17 1703   Coping/Psychosocial Response Interventions   Plan Of Care Reviewed With patient --    Patient Care Overview   Progress --  improving   Outcome Evaluation   Outcome Summary/Follow up Plan --  Patient was able to walk the halls today without getting as short of air as he did yesterday. He is still in a lot of pain, being controlled by the morphine.       Goal: Adult Individualization and Mutuality  Outcome: Ongoing (interventions implemented as appropriate)    04/22/17 1744   Mutuality/Individual Preferences   What Anxieties, Fears or Concerns Do You Have About Your Health or Care? Patient is concerned that he will go home taking a dose of xarelto that is not therapeutic for him and develop another clot after discharge.       Goal: Discharge Needs Assessment  Outcome: Ongoing (interventions implemented as appropriate)    04/22/17 0555 04/22/17 1744   Discharge Needs Assessment   Concerns To Be Addressed --  medication concerns   Concerns Comments --  Patient is concerned that he will not be prescribed the correct dose of xarelto and will develop another clot after discharge,   Readmission Within The Last 30 Days --  previous discharge plan unsuccessful   Living Environment   Transportation Available --  car;family or friend will provide   Self-Care   Equipment Currently Used at Home wheelchair;shower chair;walker, standard;respiratory supplies --          Problem: Pain, Acute (Adult)  Goal: Identify Related Risk Factors and Signs and Symptoms  Outcome: Ongoing (interventions implemented as appropriate)    04/22/17 1744   Pain, Acute   Signs and Symptoms (Acute Pain) facial mask of pain/grimace;fatigue/weakness;verbalization of pain descriptors       Goal: Acceptable Pain Control/Comfort Level  Outcome: Ongoing (interventions implemented as  appropriate)    Problem: Respiratory Insufficiency (Adult)  Goal: Identify Related Risk Factors and Signs and Symptoms  Outcome: Ongoing (interventions implemented as appropriate)    04/22/17 1744   Respiratory Insufficiency   Related Risk Factors (Respiratory Insufficiency) obesity;pain   Signs and Symptoms (Respiratory Insufficiency) dyspnea;shortness of breath       Goal: Acid/Base Balance  Outcome: Ongoing (interventions implemented as appropriate)    04/22/17 1744   Respiratory Insufficiency (Adult)   Acid/Base Balance making progress toward outcome       Goal: Effective Ventilation  Outcome: Ongoing (interventions implemented as appropriate)    04/22/17 1744   Respiratory Insufficiency (Adult)   Effective Ventilation making progress toward outcome

## 2017-04-23 NOTE — PROGRESS NOTES
[facility]  INPATIENT PROGRESS NOTE    Length of Stay: 1  Date of Admission: 4/22/2017  Primary Care Physician: Edwige Briceno MD    Subjective   Subjective    Feels breathing is improved a little.  Still has some SOA on exertion     Review of Systems   Constitutional: Negative for activity change.   Respiratory: Positive for shortness of breath.         All pertinent negatives and positives are as above. All other systems have been reviewed and are negative unless otherwise stated.     Objective    Temp:  [96.9 °F (36.1 °C)-98.6 °F (37 °C)] 96.9 °F (36.1 °C)  Heart Rate:  [69-79] 70  Resp:  [20-24] 24  BP: (112-145)/(54-79) 145/65  Physical Exam   Constitutional: He appears well-developed and well-nourished. No distress.   HENT:   Head: Normocephalic and atraumatic.   Cardiovascular: Normal rate.    Pulmonary/Chest: He has no wheezes. He has no rales.   Abdominal: Soft. He exhibits no distension. There is no tenderness.   Neurological: He is alert.   Skin: Skin is warm and dry. He is not diaphoretic.   Psychiatric: He has a normal mood and affect. His behavior is normal. Judgment and thought content normal.   Vitals reviewed.          Results Review:  I have reviewed the labs, radiology results, and diagnostic studies.    Laboratory Data:   Lab Results (last 24 hours)     Procedure Component Value Units Date/Time    POC Glucose Fingerstick [[id number]]  (Abnormal) Collected:  04/22/17 2007    Specimen:  Blood Updated:  04/22/17 2059     Glucose 186 (H) mg/dL       RN NotifiedMeter: [id number]: [id number] [facility]       POC Glucose Fingerstick [[id number]]  (Abnormal) Collected:  04/22/17 1044    Specimen:  Blood Updated:  04/23/17 0609     Glucose 158 (H) mg/dL       RN NotifiedMeter: [id number]: [id number] POOJA MELCHORIA       POC Glucose Fingerstick [[id number]]  (Abnormal) Collected:  04/22/17 1704    Specimen:  Blood Updated:   04/23/17 0611     Glucose 166 (H) mg/dL       RN NotifiedMeter: [id number]: [id number] POOJA GAMA       CBC & Differential [[id number]] Collected:  04/23/17 0540    Specimen:  Blood Updated:  04/23/17 0626    Narrative:       The following orders were created for panel order CBC & Differential.  Procedure                               Abnormality         Status                     ---------                               -----------         ------                     CBC Auto Differential[[id number]]         Abnormal            Final result                 Please view results for these tests on the individual orders.    CBC Auto Differential [[id number]]  (Abnormal) Collected:  04/23/17 0540    Specimen:  Blood Updated:  04/23/17 0626     WBC 7.11 10*3/mm3      RBC 4.48 10*6/mm3      Hemoglobin 12.5 (L) g/dL      Hematocrit 37.1 (L) %      MCV 82.8 fL      MCH 27.9 pg      MCHC 33.7 g/dL      RDW 15.1 (H) %      RDW-SD 45.6 (H) fl      MPV 8.9 fL      Platelets 185 10*3/mm3      Neutrophil % 67.3 %      Lymphocyte % 19.5 %      Monocyte % 7.7 %      Eosinophil % 4.1 %      Basophil % 0.1 %      Immature Grans % 1.3 (H) %      Neutrophils, Absolute 4.78 10*3/mm3      Lymphocytes, Absolute 1.39 10*3/mm3      Monocytes, Absolute 0.55 10*3/mm3      Eosinophils, Absolute 0.29 10*3/mm3      Basophils, Absolute 0.01 10*3/mm3      Immature Grans, Absolute 0.09 (H) 10*3/mm3     Basic Metabolic Panel [[id number]]  (Abnormal) Collected:  04/23/17 0540    Specimen:  Blood Updated:  04/23/17 0636     Glucose 235 (H) mg/dL      BUN 12 mg/dL      Creatinine 0.77 mg/dL      Sodium 136 (L) mmol/L      Potassium 4.0 mmol/L      Chloride 100 mmol/L      CO2 24.0 mmol/L      Calcium 8.5 mg/dL      eGFR Non African Amer 113 mL/min/1.73      BUN/Creatinine Ratio 15.6     Anion Gap 12.0 mmol/L     POC Glucose Fingerstick [[id number]]  (Abnormal) Collected:  04/23/17 0613    Specimen:  Blood Updated:  04/23/17 0643     Glucose 207 (H)  mg/dL       RN NotifiedMeter: [id number]: [id number] MATT EDY             Culture Data:   No results found for: BLOODCX  No results found for: URINECX  No results found for: RESPCX  No results found for: WOUNDCX  No results found for: STOOLCX  No components found for: BODYFLD    Radiology Data:   Imaging Results (last 24 hours)     Procedure Component Value Units Date/Time    US Venous Doppler Lower Extremity Bilateral (duplex) [[id number]] Collected:  04/22/17 1224     Updated:  04/22/17 1439    Narrative:       .  EXAMINATION:  Ultrasound, venous, lower extremity    CLINICAL INDICATION / HISTORY:  Dyspnea, lower extremity swelling    COMPARISON:  none  TECHNIQUE:  Lateral lower extremity(ies)                          serial axial graded compression technique                          grayscale, color flow, spectral and  Doppler     FINDINGS:    Imaged vessels:    - common femoral vein (CFV)    - deep femoral vein (FV) (formally called superficial femoral  vein (SFV))    - profunda femoral vein (PFV) (cephalad portion)    - popliteal vein (PV)    - posterior tibial vein (PTV)    - greater saphenous vein (GSV) (non-contiguous segments)    Unless otherwise indicated, all of the above described vessels  were freely compressible and demonstrated spontaneous and phasic  flow as well as appropriate flow with augmentation.    .    Impression:       CONCLUSION:  1. Negative examination - no evidence of deep venous thrombosis  within the femoral-popliteal system of the bilateral lower  extremity(ies).        Electronically signed by:  MIGUEL A Soni MD  4/22/2017 2:37  PM CDT Workstation: [id number]          I have reviewed the patient current medications.     Assessment/Plan     Hospital Problem List     Chest pain    Pulmonary embolism          Pulmonary embolism - patient on xarelto now.  He is questioning whether he should go back to taking coumadin since he had another PE.  Dr. Cassidy has been consulted to  talk to him about further management of his anticoagulation   CAD  - aspirin , effient, pravastatin              Discharge Planning: I expect patient to be discharged to home in 1-2 days.    Ronaldo Osorio MD   04/23/17   10:51 AM

## 2017-04-24 PROBLEM — I26.99 PULMONARY EMBOLISM: Chronic | Status: ACTIVE | Noted: 2017-04-22

## 2017-04-24 PROBLEM — E11.9 TYPE 2 DIABETES MELLITUS: Chronic | Status: ACTIVE | Noted: 2017-04-24

## 2017-04-24 LAB
ANION GAP SERPL CALCULATED.3IONS-SCNC: 13 MMOL/L (ref 5–15)
BASOPHILS # BLD AUTO: 0.01 10*3/MM3 (ref 0–0.2)
BASOPHILS NFR BLD AUTO: 0.1 % (ref 0–2)
BUN BLD-MCNC: 12 MG/DL (ref 7–21)
BUN/CREAT SERPL: 14 (ref 7–25)
CALCIUM SPEC-SCNC: 8.8 MG/DL (ref 8.4–10.2)
CHLORIDE SERPL-SCNC: 97 MMOL/L (ref 95–110)
CO2 SERPL-SCNC: 27 MMOL/L (ref 22–31)
CREAT BLD-MCNC: 0.86 MG/DL (ref 0.7–1.3)
DEPRECATED RDW RBC AUTO: 46.6 FL (ref 35.1–43.9)
EOSINOPHIL # BLD AUTO: 0.32 10*3/MM3 (ref 0–0.7)
EOSINOPHIL NFR BLD AUTO: 4.1 % (ref 0–7)
ERYTHROCYTE [DISTWIDTH] IN BLOOD BY AUTOMATED COUNT: 15.4 % (ref 11.5–14.5)
GFR SERPL CREATININE-BSD FRML MDRD: 100 ML/MIN/1.73 (ref 70–162)
GLUCOSE BLD-MCNC: 220 MG/DL (ref 60–100)
GLUCOSE BLDC GLUCOMTR-MCNC: 128 MG/DL (ref 70–130)
GLUCOSE BLDC GLUCOMTR-MCNC: 148 MG/DL (ref 70–130)
GLUCOSE BLDC GLUCOMTR-MCNC: 196 MG/DL (ref 70–130)
GLUCOSE BLDC GLUCOMTR-MCNC: 226 MG/DL (ref 70–130)
HCT VFR BLD AUTO: 38.5 % (ref 39–49)
HGB BLD-MCNC: 13 G/DL (ref 13.7–17.3)
IMM GRANULOCYTES # BLD: 0.1 10*3/MM3 (ref 0–0.02)
IMM GRANULOCYTES NFR BLD: 1.3 % (ref 0–0.5)
LYMPHOCYTES # BLD AUTO: 1.56 10*3/MM3 (ref 0.6–4.2)
LYMPHOCYTES NFR BLD AUTO: 20.2 % (ref 10–50)
MCH RBC QN AUTO: 27.9 PG (ref 26.5–34)
MCHC RBC AUTO-ENTMCNC: 33.8 G/DL (ref 31.5–36.3)
MCV RBC AUTO: 82.6 FL (ref 80–98)
MONOCYTES # BLD AUTO: 0.68 10*3/MM3 (ref 0–0.9)
MONOCYTES NFR BLD AUTO: 8.8 % (ref 0–12)
NEUTROPHILS # BLD AUTO: 5.06 10*3/MM3 (ref 2–8.6)
NEUTROPHILS NFR BLD AUTO: 65.5 % (ref 37–80)
NRBC BLD MANUAL-RTO: 0 /100 WBC (ref 0–0)
PLATELET # BLD AUTO: 186 10*3/MM3 (ref 150–450)
PMV BLD AUTO: 8.2 FL (ref 8–12)
POTASSIUM BLD-SCNC: 3.9 MMOL/L (ref 3.5–5.1)
RBC # BLD AUTO: 4.66 10*6/MM3 (ref 4.37–5.74)
SODIUM BLD-SCNC: 137 MMOL/L (ref 137–145)
WBC NRBC COR # BLD: 7.73 10*3/MM3 (ref 3.2–9.8)

## 2017-04-24 PROCEDURE — 94760 N-INVAS EAR/PLS OXIMETRY 1: CPT

## 2017-04-24 PROCEDURE — 63710000001 INSULIN ASPART PER 5 UNITS: Performed by: INTERNAL MEDICINE

## 2017-04-24 PROCEDURE — 99221 1ST HOSP IP/OBS SF/LOW 40: CPT | Performed by: NURSE PRACTITIONER

## 2017-04-24 PROCEDURE — 80048 BASIC METABOLIC PNL TOTAL CA: CPT | Performed by: FAMILY MEDICINE

## 2017-04-24 PROCEDURE — 82962 GLUCOSE BLOOD TEST: CPT

## 2017-04-24 PROCEDURE — 94799 UNLISTED PULMONARY SVC/PX: CPT

## 2017-04-24 PROCEDURE — 25010000002 MORPHINE PER 10 MG: Performed by: FAMILY MEDICINE

## 2017-04-24 PROCEDURE — 85025 COMPLETE CBC W/AUTO DIFF WBC: CPT | Performed by: FAMILY MEDICINE

## 2017-04-24 RX ADMIN — METFORMIN HYDROCHLORIDE 1000 MG: 500 TABLET ORAL at 17:34

## 2017-04-24 RX ADMIN — SUCRALFATE 1 G: 1 TABLET ORAL at 08:12

## 2017-04-24 RX ADMIN — BUDESONIDE 0.5 MG: 0.5 INHALANT RESPIRATORY (INHALATION) at 19:28

## 2017-04-24 RX ADMIN — SUCRALFATE 1 G: 1 TABLET ORAL at 12:58

## 2017-04-24 RX ADMIN — RANOLAZINE 1000 MG: 500 TABLET, FILM COATED, EXTENDED RELEASE ORAL at 08:12

## 2017-04-24 RX ADMIN — RIVAROXABAN 15 MG: 15 TABLET, FILM COATED ORAL at 08:12

## 2017-04-24 RX ADMIN — LORAZEPAM 1 MG: 1 TABLET ORAL at 08:16

## 2017-04-24 RX ADMIN — INSULIN ASPART 5 UNITS: 100 INJECTION, SOLUTION INTRAVENOUS; SUBCUTANEOUS at 12:57

## 2017-04-24 RX ADMIN — IPRATROPIUM BROMIDE AND ALBUTEROL SULFATE 3 ML: 2.5; .5 SOLUTION RESPIRATORY (INHALATION) at 10:11

## 2017-04-24 RX ADMIN — LISINOPRIL 40 MG: 40 TABLET ORAL at 08:12

## 2017-04-24 RX ADMIN — RIVAROXABAN 15 MG: 15 TABLET, FILM COATED ORAL at 17:34

## 2017-04-24 RX ADMIN — BUDESONIDE 0.5 MG: 0.5 INHALANT RESPIRATORY (INHALATION) at 07:16

## 2017-04-24 RX ADMIN — MORPHINE SULFATE 4 MG: 4 INJECTION, SOLUTION INTRAMUSCULAR; INTRAVENOUS at 14:06

## 2017-04-24 RX ADMIN — PRAVASTATIN SODIUM 80 MG: 40 TABLET ORAL at 20:55

## 2017-04-24 RX ADMIN — Medication 10 ML: at 03:13

## 2017-04-24 RX ADMIN — MORPHINE SULFATE 4 MG: 4 INJECTION, SOLUTION INTRAMUSCULAR; INTRAVENOUS at 19:34

## 2017-04-24 RX ADMIN — IPRATROPIUM BROMIDE AND ALBUTEROL SULFATE 3 ML: 2.5; .5 SOLUTION RESPIRATORY (INHALATION) at 07:11

## 2017-04-24 RX ADMIN — AMLODIPINE BESYLATE 10 MG: 10 TABLET ORAL at 08:12

## 2017-04-24 RX ADMIN — METOPROLOL SUCCINATE 100 MG: 100 TABLET, EXTENDED RELEASE ORAL at 08:12

## 2017-04-24 RX ADMIN — MORPHINE SULFATE 4 MG: 4 INJECTION, SOLUTION INTRAMUSCULAR; INTRAVENOUS at 03:13

## 2017-04-24 RX ADMIN — ASPIRIN 81 MG: 81 TABLET, CHEWABLE ORAL at 08:12

## 2017-04-24 RX ADMIN — PRASUGREL HYDROCHLORIDE 10 MG: 10 TABLET, FILM COATED ORAL at 08:12

## 2017-04-24 RX ADMIN — PRAMIPEXOLE DIHYDROCHLORIDE 0.25 MG: 0.25 TABLET ORAL at 20:55

## 2017-04-24 RX ADMIN — Medication 10 ML: at 14:06

## 2017-04-24 RX ADMIN — METFORMIN HYDROCHLORIDE 1000 MG: 500 TABLET ORAL at 08:12

## 2017-04-24 RX ADMIN — SUCRALFATE 1 G: 1 TABLET ORAL at 20:55

## 2017-04-24 RX ADMIN — SUCRALFATE 1 G: 1 TABLET ORAL at 17:34

## 2017-04-24 RX ADMIN — IPRATROPIUM BROMIDE AND ALBUTEROL SULFATE 3 ML: 2.5; .5 SOLUTION RESPIRATORY (INHALATION) at 19:28

## 2017-04-24 RX ADMIN — PANTOPRAZOLE SODIUM 40 MG: 40 TABLET, DELAYED RELEASE ORAL at 08:12

## 2017-04-24 RX ADMIN — TRAZODONE HYDROCHLORIDE 300 MG: 150 TABLET ORAL at 20:55

## 2017-04-24 RX ADMIN — ISOSORBIDE MONONITRATE 120 MG: 60 TABLET, EXTENDED RELEASE ORAL at 08:12

## 2017-04-24 RX ADMIN — LORAZEPAM 1 MG: 1 TABLET ORAL at 17:37

## 2017-04-24 RX ADMIN — IPRATROPIUM BROMIDE AND ALBUTEROL SULFATE 3 ML: 2.5; .5 SOLUTION RESPIRATORY (INHALATION) at 14:10

## 2017-04-24 RX ADMIN — RANOLAZINE 1000 MG: 500 TABLET, FILM COATED, EXTENDED RELEASE ORAL at 20:55

## 2017-04-24 RX ADMIN — INSULIN ASPART 3 UNITS: 100 INJECTION, SOLUTION INTRAVENOUS; SUBCUTANEOUS at 17:33

## 2017-04-24 NOTE — PLAN OF CARE
Problem: Patient Care Overview (Adult)  Goal: Plan of Care Review  Outcome: Ongoing (interventions implemented as appropriate)    04/24/17 0321   Coping/Psychosocial Response Interventions   Plan Of Care Reviewed With patient   Patient Care Overview   Progress no change   Outcome Evaluation   Outcome Summary/Follow up Plan pt uses wheelchair to get around in hallway, patient needs diet education as he is eating excessively throughout the night. c/o not being able to sleep. pt struggles with pain management as well.        Goal: Adult Individualization and Mutuality  Outcome: Ongoing (interventions implemented as appropriate)    Problem: Pain, Acute (Adult)  Goal: Identify Related Risk Factors and Signs and Symptoms  Outcome: Ongoing (interventions implemented as appropriate)  Goal: Acceptable Pain Control/Comfort Level  Outcome: Ongoing (interventions implemented as appropriate)    Problem: Respiratory Insufficiency (Adult)  Goal: Identify Related Risk Factors and Signs and Symptoms  Outcome: Ongoing (interventions implemented as appropriate)  Goal: Acid/Base Balance  Outcome: Ongoing (interventions implemented as appropriate)  Goal: Effective Ventilation  Outcome: Ongoing (interventions implemented as appropriate)

## 2017-04-24 NOTE — PROGRESS NOTES
Discharge Planning Assessment  [facility]     Patient Name: Yordy Hansen  MRN: [id number]  Today's Date: 4/24/2017    Admit Date: 4/22/2017          Discharge Needs Assessment       04/24/17 1302    Living Environment    Lives With spouse    Living Arrangements mobile home    Provides Primary Care For no one    Quality Of Family Relationships supportive    Discharge Needs Assessment    Concerns To Be Addressed no discharge needs identified    Equipment Currently Used at Home wheelchair;shower chair;walker, standard;respiratory supplies    Equipment Needed After Discharge none    Transportation Available car;family or friend will provide            Discharge Plan       04/24/17 1303    Case Management/Social Work Plan    Plan Home with no services.  Does not want home health services.         Discharge Placement     No information found                Demographic Summary       04/24/17 1304    Primary Care Physician Information    Name Dr. Ruslan Gonzalez            Functional Status       04/24/17 1301    Functional Status Prior    Ambulation 0-->independent    Transferring 0-->independent    Toileting 0-->independent    Bathing 0-->independent    Dressing 0-->independent    Eating 0-->independent    Communication 0-->understands/communicates without difficulty    Swallowing 0-->swallows foods/liquids without difficulty    IADL    Medications independent    Meal Preparation independent    Housekeeping independent    Laundry independent    Shopping independent    Oral Care independent    Cognitive/Perceptual/Developmental    Current Mental Status/Cognitive Functioning no deficits noted            Psychosocial     None            Abuse/Neglect     None            Legal     None            Substance Abuse     None            Patient Forms     None          Yanely Chua

## 2017-04-24 NOTE — CONSULTS
CVTS CONSULTATION          Patient Care Team:  Edwige Briceno MD as PCP - General (Cardiology)  Requesting Provider:Ronaldo Osorio MD    Chief complaint:SOA, PE      SUBJECTIVE       History of Present Illness:  History of Present Illness: 36 y.o. male with known CAD SP DUE stent X2 to LAD in June 2016 for which he continues on EFFIENT. He presented with acute PE 3/2017. Lower extremity Duplex: No DVT. CVTS saw in hospital for anticoagulation recommendations. He has been following in the coumadin clinic for dosing INR 2-3 and wished to try NOAC medications. Was seen in the clinic last Wednesday when he was changed to Xarelto maintenance dosing. INR was 2.1 that day.  He then returned to ER on SAT with SOA and new filling defect RUL. His Xarelto dosing was increased to Acute dosing. Echo demonstrated no RV strain. CVTS to evaluate condition.    The following portions of the patient's history were reviewed and updated as appropriate: allergies, current medications, past family history, past medical history, past social history, past surgical history and problem list.  Recent images independently reviewed.  Available laboratory values reviewed.    Review of Systems   Constitutional: Positive for fatigue.   Respiratory: Positive for shortness of breath. Negative for chest tightness.    Cardiovascular: Negative for palpitations and leg swelling.   Neurological: Negative for light-headedness.   All other systems reviewed and are negative.       Past Medical History:   Diagnosis Date   • Abdominal pain    • Acute right otitis media    • Allergic rhinitis    • Asthma    • Backache    • Bronchitis    • Cellulitis of lower leg     Right   • Chest pain    • Chronic back pain    • Chronic pain    • Cough    • Diarrhea    • Dyspnea    • GERD (gastroesophageal reflux disease)    • Headache    • Hip pain    • Hypertension    • Insomnia    • Low back pain    • Lumbosacral strain    • Morbid obesity    • Pulmonary embolism  03/24/2017   • RLS (restless legs syndrome)    • Seizures      Past Surgical History:   Procedure Laterality Date   • CARDIAC CATHETERIZATION      LAD stent   • CARDIAC CATHETERIZATION N/A 3/15/2017    Procedure: Left Heart Cath;  Surgeon: Edwige Briceno MD;  Location: [facility] CATH INVASIVE LOCATION;  Service:    • CARDIAC CATHETERIZATION N/A 3/15/2017    Procedure: Stent SOPHIA coronary;  Surgeon: Edwige Briceno MD;  Location: [facility] CATH INVASIVE LOCATION;  Service:    • VT RT/LT HEART CATHETERS N/A 3/15/2017    Procedure: Percutaneous Coronary Intervention;  Surgeon: Edwige Briceno MD;  Location: [facility] CATH INVASIVE LOCATION;  Service: Cardiovascular   • TRANSESOPHAGEAL ECHOCARDIOGRAM (MONICA)      With color flow     Family History   Problem Relation Age of Onset   • Cancer Other    • Coronary artery disease Other    • Diabetes Other    • Heart disease Other    • Hypertension Other      Social History   Substance Use Topics   • Smoking status: Former Smoker   • Smokeless tobacco: Current User     Types: Snuff   • Alcohol use No     Prescriptions Prior to Admission   Medication Sig Dispense Refill Last Dose   • albuterol (PROVENTIL HFA;VENTOLIN HFA) 108 (90 BASE) MCG/ACT inhaler Inhale 2 puffs as needed for wheezing.   Taking   • amLODIPine (NORVASC) 10 MG tablet Take 10 mg by mouth daily.   Taking   • aspirin 81 MG tablet Take 81 mg by mouth Daily.   Taking   • Canagliflozin (INVOKANA) 100 MG tablet Take 1 tablet by mouth daily.   Taking   • FENOFIBRATE MICRONIZED PO Take 1 tablet by mouth every night.   Taking   • ipratropium-albuterol (DUO-NEB) 0.5-2.5 mg/mL nebulizer Inhale 3 mL Every 4 (Four) Hours As Needed.   Taking   • isosorbide mononitrate (IMDUR) 60 MG 24 hr tablet Take 120 mg by mouth Daily.   Taking   • Liraglutide (VICTOZA) 18 MG/3ML solution pen-injector Inject  under the skin.   Taking   • lisinopril (PRINIVIL,ZESTRIL) 20 MG tablet Take 40 mg by mouth Daily.   Taking   • lisinopril-hydrochlorothiazide  (PRINZIDE,ZESTORETIC) 20-25 MG per tablet Take 1 tablet by mouth.   Taking   • metFORMIN (GLUCOPHAGE) 1000 MG tablet Take 1,000 mg by mouth 2 (Two) Times a Day With Meals.   Taking   • metoprolol succinate XL (TOPROL-XL) 100 MG 24 hr tablet Take 1 tablet by mouth Daily. 30 tablet 1 Taking   • MICROLET LANCETS misc USE TO TEST BLOOD SUGAR EVERY DAY AND AS NEEDED   Taking   • nitroglycerin (NITROSTAT) 0.4 MG SL tablet Place 1 tablet under the tongue Every 5 (Five) Minutes As Needed for chest pain. 25 tablet 6 Taking   • ondansetron ODT (ZOFRAN-ODT) 4 MG disintegrating tablet Take 1 tablet by mouth Every 6 (Six) Hours As Needed for Nausea or Vomiting. 12 tablet 0 Taking   • orlistat (XENICAL) 120 MG capsule Take 1 capsule by mouth 3 (Three) Times a Day With Meals. 180 capsule 6 Taking   • pantoprazole (PROTONIX) 40 MG EC tablet Take 40 mg by mouth Daily.   Taking   • pramipexole (MIRAPEX) 1 MG tablet Take 1 mg by mouth Every Night. Taking 2mg daily   Taking   • prasugrel (EFFIENT) 10 MG tablet Take 1 tablet by mouth Daily. 30 tablet 1 Taking   • pravastatin (PRAVACHOL) 40 MG tablet Take 80 mg by mouth Every Night.   Taking   • ranolazine (RANEXA) 1000 MG 12 hr tablet Take 1 tablet by mouth Every 12 (Twelve) Hours. 60 tablet 1 Taking   • rivaroxaban (XARELTO) 20 MG tablet Take 1 tablet by mouth Daily With Dinner. 30 tablet 5    • sucralfate (CARAFATE) 1 G tablet Take 1 tablet by mouth 4 (Four) Times a Day. 120 tablet 0 Taking   • traZODone (DESYREL) 300 MG tablet Take 300 mg by mouth.   Taking       amLODIPine 10 mg Oral Daily   aspirin 81 mg Oral Daily   budesonide 0.5 mg Nebulization BID - RT   insulin aspart 0-14 Units Subcutaneous 4x Daily AC & at Bedtime   ipratropium-albuterol 3 mL Nebulization 4x Daily - RT   isosorbide mononitrate 120 mg Oral Daily   lisinopril 40 mg Oral Daily   metFORMIN 1,000 mg Oral BID With Meals   metoprolol succinate  mg Oral Daily   pantoprazole 40 mg Oral Daily   pramipexole 0.25  "mg Oral Nightly   prasugrel 10 mg Oral Daily   pravastatin 80 mg Oral Nightly   ranolazine 1,000 mg Oral Q12H   rivaroxaban 15 mg Oral BID With Meals   sucralfate 1 g Oral 4x Daily   traZODone 300 mg Oral Nightly     Allergies:  Glipizide; Phenergan [promethazine hcl]; Risperdal [risperidone]; and Tramadol    OBJECTIVE        Vital Signs  Temp:  [96.3 °F (35.7 °C)-97.8 °F (36.6 °C)] 97.3 °F (36.3 °C)  Heart Rate:  [60-79] 75  Resp:  [16-24] 18  BP: (127-173)/(58-79) 137/65    Flowsheet Rows         First Filed Value    Admission Height  71\" (180.3 cm) Documented at 04/22/2017 0117    Admission Weight  (!)  456 lb (207 kg) Documented at 04/22/2017 0117        70.98\" (180.3 cm)    Physical Exam  Constitutional: He is oriented to person, place, and time. He appears morbidly obese.  HENT:   Head: Normocephalic.   Mouth/Throat: Oropharynx is clear and moist.   Eyes: Conjunctivae are normal. Pupils are equal, round, and reactive to light.   Neck: No JVD present. No tracheal deviation present.   Cardiovascular: Normal rate, regular rhythm, Distant normal heart sounds and intact distal pulses.   Pulses:  Carotid pulses are 1+ on the right side with bruit, and 1+ on the left side.  Radial pulses are 2+ on the right side, and 2+ on the left side.   Dorsalis pedis pulses are 2+ on the right side, and 2+ on the left side.   Posterior tibial pulses are 2+ on the right side, and 2+ on the left side.   Pulmonary/Chest: Effort normal and breath sounds normal. No stridor. Distant  Abdominal: Soft. Bowel sounds are normal. He exhibits no distension. There is no tenderness.   Musculoskeletal: He exhibits no edema or tenderness.   Neurological: He is alert and oriented to person, place, and time. No cranial nerve deficit.   Skin: Skin is warm and dry. No rash noted. No erythema. No pallor.   Psychiatric: Judgment normal.   Nursing note and vitals reviewed.    Results Review:   Lab Results (last 24 hours)     Procedure Component Value " Units Date/Time    POC Glucose Fingerstick [[id number]]  (Normal) Collected:  04/23/17 1736    Specimen:  Blood Updated:  04/23/17 1748     Glucose 124 mg/dL       RN NotifiedMeter: [id number]: [id number] POOJA NATAN       POC Glucose Fingerstick [[id number]]  (Abnormal) Collected:  04/23/17 1005    Specimen:  Blood Updated:  04/23/17 1953     Glucose 237 (H) mg/dL       RN NotifiedMeter: [id number]: [id number] POOJA NATAN       POC Glucose Fingerstick [[id number]]  (Abnormal) Collected:  04/23/17 2014    Specimen:  Blood Updated:  04/23/17 2123     Glucose 188 (H) mg/dL       RN NotifiedMeter: [id number]: [id number] JUWAN THOMAS       POC Glucose Fingerstick [[id number]]  (Abnormal) Collected:  04/24/17 0613    Specimen:  Blood Updated:  04/24/17 0648     Glucose 148 (H) mg/dL       RN NotifiedMeter: [id number]: [id number] JUWAN THOMAS       CBC & Differential [[id number]] Collected:  04/24/17 0639    Specimen:  Blood Updated:  04/24/17 0719    Narrative:       The following orders were created for panel order CBC & Differential.  Procedure                               Abnormality         Status                     ---------                               -----------         ------                     CBC Auto Differential[[id number]]         Abnormal            Final result                 Please view results for these tests on the individual orders.    CBC Auto Differential [[id number]]  (Abnormal) Collected:  04/24/17 0639    Specimen:  Blood Updated:  04/24/17 0719     WBC 7.73 10*3/mm3      RBC 4.66 10*6/mm3      Hemoglobin 13.0 (L) g/dL      Hematocrit 38.5 (L) %      MCV 82.6 fL      MCH 27.9 pg      MCHC 33.8 g/dL      RDW 15.4 (H) %      RDW-SD 46.6 (H) fl      MPV 8.2 fL      Platelets 186 10*3/mm3      Neutrophil % 65.5 %      Lymphocyte % 20.2 %      Monocyte % 8.8 %      Eosinophil % 4.1 %      Basophil % 0.1 %      Immature Grans % 1.3 (H) %      Neutrophils, Absolute  5.06 10*3/mm3      Lymphocytes, Absolute 1.56 10*3/mm3      Monocytes, Absolute 0.68 10*3/mm3      Eosinophils, Absolute 0.32 10*3/mm3      Basophils, Absolute 0.01 10*3/mm3      Immature Grans, Absolute 0.10 (H) 10*3/mm3      nRBC 0.0 /100 WBC     Basic Metabolic Panel [[id number]]  (Abnormal) Collected:  04/24/17 0810    Specimen:  Blood Updated:  04/24/17 0844     Glucose 220 (H) mg/dL      BUN 12 mg/dL      Creatinine 0.86 mg/dL      Sodium 137 mmol/L      Potassium 3.9 mmol/L      Chloride 97 mmol/L      CO2 27.0 mmol/L      Calcium 8.8 mg/dL      eGFR Non African Amer 100 mL/min/1.73      BUN/Creatinine Ratio 14.0     Anion Gap 13.0 mmol/L         Lab Results   Component Value Date    INR 1.31 (H) 04/22/2017    INR 2.10 (A) 04/19/2017    INR 2.00 (A) 04/14/2017    PROTIME 16.3 (H) 04/22/2017    PROTIME 17.9 (H) 04/08/2017    PROTIME 17.2 (H) 04/07/2017         US Venous Doppler Lower Extremity Bilateral (duplex) [[id number]] Not Reviewed       Order Status: Completed Collected: 04/22/17 1224        Updated: 04/22/17 1439       Narrative:         .  EXAMINATION:  Ultrasound, venous, lower extremity    CLINICAL INDICATION / HISTORY:  Dyspnea, lower extremity swelling    COMPARISON:  none  TECHNIQUE:  Lateral lower extremity(ies)                          serial axial graded compression technique                          grayscale, color flow, spectral and  Doppler     FINDINGS:    Imaged vessels:    - common femoral vein (CFV)    - deep femoral vein (FV) (formally called superficial femoral  vein (SFV))    - profunda femoral vein (PFV) (cephalad portion)    - popliteal vein (PV)    - posterior tibial vein (PTV)    - greater saphenous vein (GSV) (non-contiguous segments)    Unless otherwise indicated, all of the above described vessels  were freely compressible and demonstrated spontaneous and phasic  flow as well as appropriate flow with augmentation.    .       Impression:         CONCLUSION:  1. Negative  examination - no evidence of deep venous thrombosis  within the femoral-popliteal system of the bilateral lower  extremity(ies).          CT Angiogram Chest With Contrast [[id number]] Not Reviewed        Order Status: Completed Collected: 04/22/17 0253        Updated: 04/22/17 0346       Narrative:         Exam: CT angiogram of the chest with contrast    INDICATION: Shortness of breath    Technical: Routine CT angiogram the chest with contrast. Computer  generated 3-D reconstruction/MIPS were obtained. Images are  suboptimal due to suboptimal opacification of the pulmonary  arterial tree and body habitus. CT technique performed using  ALARA.    COMPARISON: 3/27/2017    FINDINGS: No mediastinal hilar adenopathy. Mediastinal  lipomatosis is present. No aortic aneurysm or dissection. Limited  evaluation pulmonary arterial tree. Difficult to evaluate the  segmental branches. There is a filling defect present in the  right upper lobe segmental branch image 47 not seen on the  previous study. Heart is enlarged. Lungs are clear. No  pneumothorax or pleural effusion.    Imaged upper abdomen is is unremarkable.    The bony structures are intact.         Impression:         1. Suboptimal exam.  2. There is at least one new filling defect present in the right  upper lobe pulmonary artery branch compatible with an acute  pulmonary embolus.    Findings discussed with Dr. Mayen on 4/21/2017 at 3:43 AM    Electronically signed by:  Brennan Corrigan MD  4/22/2017 3:45 AM  CDT Workstation: PCA Audit      Echocardiogram Findings   Left Ventricle  Left ventricular function is normal. Estimated EF = 55%. Normal left ventricular wall thickness noted. All left ventricular wall segments contract normally. The left ventricular cavity is mildly dilated.   Right Ventricle  Normal cavity size and systolic function noted.   Left Atrium  Left atrial cavity size is borderline dilated.   Right Atrium  Right atrial cavity size is borderline  dilated.   Aortic Valve  The aortic valve is grossly normal in structure.   Mitral Valve  The mitral valve is grossly normal in structure.   Tricuspid Valve  Tricuspid valve not well visualized.   Pulmonic Valve  The pulmonic valve was not well visualized.   Pericardium  There is no evidence of pericardial effusion.             ASSESSMENT/PLAN       Active Problems:    Chest pain    Pulmonary embolism      Assessment:    Condition: In stable condition.   (Acute Pulmonary Embolus RIGHT upper lobe. Switched to NOAC within last 5 days. Discussed at length both in hospital and clinic possibility of NOAC failure due to morbid obesity. 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 Patient wishes to attempt NOAC as he does not want dietary restrictions with Coumadin therapy life-long.).     Plan:   (It is reasonable to Acute dose Xarelto and monitor symptoms. If symptoms do not resolve will need Coumadin/lovenox treatment. Follow up in Clinic 1-2 weeks at discharge. ).       Thank you for the opportunity to participate in this patient's care.          I discussed the patients findings and my recommendations with Dr. Cassidy.              This document has been electronically signed by LALA Winters on April 24, 2017 10:09 AM

## 2017-04-24 NOTE — PLAN OF CARE
Problem: Patient Care Overview (Adult)  Goal: Plan of Care Review  Outcome: Ongoing (interventions implemented as appropriate)  Goal: Adult Individualization and Mutuality  Outcome: Ongoing (interventions implemented as appropriate)  Goal: Discharge Needs Assessment  Outcome: Ongoing (interventions implemented as appropriate)    Problem: Pain, Acute (Adult)  Goal: Identify Related Risk Factors and Signs and Symptoms  Outcome: Ongoing (interventions implemented as appropriate)  Goal: Acceptable Pain Control/Comfort Level  Outcome: Ongoing (interventions implemented as appropriate)    Problem: Respiratory Insufficiency (Adult)  Goal: Identify Related Risk Factors and Signs and Symptoms  Outcome: Ongoing (interventions implemented as appropriate)  Goal: Acid/Base Balance  Outcome: Ongoing (interventions implemented as appropriate)  Goal: Effective Ventilation  Outcome: Ongoing (interventions implemented as appropriate)

## 2017-04-24 NOTE — PROGRESS NOTES
Progress Note  Rehan Griffiths MD  Hospitalist     LOS: 2 days   Patient Care Team:  Edwige Briceno MD as PCP - General (Cardiology)    Chief Complaint: chest pain    Subjective     Interval History:     Patient Complaints: chest pain / shortness of breath - more or less the same.    History taken from: patient    Medication Review:   Current Facility-Administered Medications   Medication Dose Route Frequency Provider Last Rate Last Dose   • albuterol (PROVENTIL) nebulizer solution 0.083% 2.5 mg/3mL  2.5 mg Nebulization PRN Connie Tapia MD       • amLODIPine (NORVASC) tablet 10 mg  10 mg Oral Daily Connie Tapia MD   10 mg at 04/24/17 0812   • aspirin chewable tablet 81 mg  81 mg Oral Daily Connie Tapia MD   81 mg at 04/24/17 0812   • budesonide (PULMICORT) nebulizer solution 0.5 mg  0.5 mg Nebulization BID - RT Ronaldo Osorio MD   0.5 mg at 04/24/17 0716   • dextrose (D50W) solution 25 g  25 g Intravenous Q15 Min PRN Connie Tapia MD       • dextrose (GLUTOSE) oral gel 15 g  15 g Oral Q15 Min PRN Connie Tapia MD       • glucagon (human recombinant) (GLUCAGEN DIAGNOSTIC) injection 1 mg  1 mg Subcutaneous Q15 Min PRN Connie Tapia MD       • hydrALAZINE (APRESOLINE) injection 10 mg  10 mg Intravenous Q4H PRN Connie Tapia MD       • insulin aspart (novoLOG) injection 0-14 Units  0-14 Units Subcutaneous 4x Daily AC & at Bedtime Connie Tapia MD   5 Units at 04/24/17 1257   • ipratropium-albuterol (DUO-NEB) nebulizer solution 3 mL  3 mL Nebulization Q4H PRN Connie Tapia MD       • ipratropium-albuterol (DUO-NEB) nebulizer solution 3 mL  3 mL Nebulization 4x Daily - RT Ronaldo Osorio MD   3 mL at 04/24/17 1011   • isosorbide mononitrate (IMDUR) 24 hr tablet 120 mg  120 mg Oral Daily Connie Tapia MD   120 mg at 04/24/17 0812   • lisinopril (PRINIVIL,ZESTRIL) tablet 40 mg  40 mg Oral Daily Connie Tapia MD   40 mg at 04/24/17 0812   • LORazepam (ATIVAN) tablet 1 mg  1 mg Oral Q6H  PRN Ronaldo Osorio MD   1 mg at 04/24/17 0816   • metFORMIN (GLUCOPHAGE) tablet 1,000 mg  1,000 mg Oral BID With Meals Connie Tapia MD   1,000 mg at 04/24/17 0812   • metoprolol succinate XL (TOPROL-XL) 24 hr tablet 100 mg  100 mg Oral Daily Connie Tapia MD   100 mg at 04/24/17 0812   • Morphine sulfate (PF) injection 4 mg  4 mg Intravenous Q3H PRN Ronaldo Osorio MD   4 mg at 04/24/17 0313   • nitroglycerin (NITROSTAT) SL tablet 0.4 mg  0.4 mg Sublingual Q5 Min PRN Deven Mayen MD       • ondansetron ODT (ZOFRAN-ODT) disintegrating tablet 4 mg  4 mg Oral Q6H PRN Connie Tapia MD       • pantoprazole (PROTONIX) EC tablet 40 mg  40 mg Oral Daily Connie Tapia MD   40 mg at 04/24/17 0812   • pramipexole (MIRAPEX) tablet 0.25 mg  0.25 mg Oral Nightly Connie Tapia MD   0.25 mg at 04/23/17 2018   • prasugrel (EFFIENT) tablet 10 mg  10 mg Oral Daily Connie Tapia MD   10 mg at 04/24/17 0812   • pravastatin (PRAVACHOL) tablet 80 mg  80 mg Oral Nightly Connie Tapia MD   80 mg at 04/23/17 2018   • ranolazine (RANEXA) 12 hr tablet 1,000 mg  1,000 mg Oral Q12H Connie Tapia MD   1,000 mg at 04/24/17 0812   • rivaroxaban (XARELTO) tablet 15 mg  15 mg Oral BID With Meals Ronaldo Osorio MD   15 mg at 04/24/17 0812   • sodium chloride 0.9 % flush 1-10 mL  1-10 mL Intravenous PRN Connie Tapia MD   10 mL at 04/23/17 1742   • sodium chloride 0.9 % flush 10 mL  10 mL Intravenous PRN Deven Mayen MD   10 mL at 04/24/17 0313   • sucralfate (CARAFATE) tablet 1 g  1 g Oral 4x Daily Connie Tapia MD   1 g at 04/24/17 1258   • traZODone (DESYREL) tablet 300 mg  300 mg Oral Nightly Connie Tapia MD   300 mg at 04/23/17 2018       Review of Systems:   Review of Systems   Constitutional: Positive for fatigue. Negative for fever.   Respiratory: Positive for cough and shortness of breath. Negative for wheezing and stridor.    Cardiovascular: Positive for chest pain.   Gastrointestinal: Negative  for abdominal distention, constipation and diarrhea.   Genitourinary: Negative for frequency and urgency.   Musculoskeletal: Positive for back pain and neck pain.   Neurological: Positive for weakness, light-headedness, numbness and headaches.   Psychiatric/Behavioral: Negative for agitation and behavioral problems.   All other systems reviewed and are negative.      Objective     Vital Signs  Temp:  [95.6 °F (35.3 °C)-97.8 °F (36.6 °C)] 95.6 °F (35.3 °C)  Heart Rate:  [60-95] 95  Resp:  [16-24] 24  BP: (127-173)/(58-79) 145/67    Physical Exam:  Physical Exam   Constitutional: He is oriented to person, place, and time.   Extreme morbid obesity   Eyes: EOM are normal. No scleral icterus.   Neck: Normal range of motion. Neck supple.   Cardiovascular: Normal rate and regular rhythm.  Exam reveals no gallop.    No murmur heard.  Pulmonary/Chest: Effort normal and breath sounds normal. No respiratory distress. He has no wheezes. He has no rales.   Abdominal: Soft. Bowel sounds are normal. He exhibits no distension. There is no tenderness. There is no rebound and no guarding.   Musculoskeletal: He exhibits no edema.   Neurological: He is oriented to person, place, and time.   Skin: No erythema.   Psychiatric: He has a normal mood and affect. His behavior is normal.   Vitals reviewed.       Results Review:    Lab Results (last 24 hours)     Procedure Component Value Units Date/Time    POC Glucose Fingerstick [[id number]]  (Normal) Collected:  04/23/17 1736    Specimen:  Blood Updated:  04/23/17 1748     Glucose 124 mg/dL       RN NotifiedMeter: [id number]: [id number] exozet       POC Glucose Fingerstick [[id number]]  (Abnormal) Collected:  04/23/17 1005    Specimen:  Blood Updated:  04/23/17 1953     Glucose 237 (H) mg/dL       RN NotifiedMeter: [id number]: [id number] [facility] NATAN       POC Glucose Fingerstick [[id number]]  (Abnormal) Collected:  04/23/17 2014    Specimen:  Blood Updated:  04/23/17 2123      Glucose 188 (H) mg/dL       RN NotifiedMeter: [id number]: [id number] JUWAN THOMAS       POC Glucose Fingerstick [[id number]]  (Abnormal) Collected:  04/24/17 0613    Specimen:  Blood Updated:  04/24/17 0648     Glucose 148 (H) mg/dL       RN NotifiedMeter: [id number]: [id number] JUWAN THOMAS       CBC & Differential [[id number]] Collected:  04/24/17 0639    Specimen:  Blood Updated:  04/24/17 0719    Narrative:       The following orders were created for panel order CBC & Differential.  Procedure                               Abnormality         Status                     ---------                               -----------         ------                     CBC Auto Differential[[id number]]         Abnormal            Final result                 Please view results for these tests on the individual orders.    CBC Auto Differential [[id number]]  (Abnormal) Collected:  04/24/17 0639    Specimen:  Blood Updated:  04/24/17 0719     WBC 7.73 10*3/mm3      RBC 4.66 10*6/mm3      Hemoglobin 13.0 (L) g/dL      Hematocrit 38.5 (L) %      MCV 82.6 fL      MCH 27.9 pg      MCHC 33.8 g/dL      RDW 15.4 (H) %      RDW-SD 46.6 (H) fl      MPV 8.2 fL      Platelets 186 10*3/mm3      Neutrophil % 65.5 %      Lymphocyte % 20.2 %      Monocyte % 8.8 %      Eosinophil % 4.1 %      Basophil % 0.1 %      Immature Grans % 1.3 (H) %      Neutrophils, Absolute 5.06 10*3/mm3      Lymphocytes, Absolute 1.56 10*3/mm3      Monocytes, Absolute 0.68 10*3/mm3      Eosinophils, Absolute 0.32 10*3/mm3      Basophils, Absolute 0.01 10*3/mm3      Immature Grans, Absolute 0.10 (H) 10*3/mm3      nRBC 0.0 /100 WBC     Basic Metabolic Panel [[id number]]  (Abnormal) Collected:  04/24/17 0810    Specimen:  Blood Updated:  04/24/17 0844     Glucose 220 (H) mg/dL      BUN 12 mg/dL      Creatinine 0.86 mg/dL      Sodium 137 mmol/L      Potassium 3.9 mmol/L      Chloride 97 mmol/L      CO2 27.0 mmol/L      Calcium 8.8 mg/dL      eGFR Non   Amer 100 mL/min/1.73      BUN/Creatinine Ratio 14.0     Anion Gap 13.0 mmol/L     POC Glucose Fingerstick [[id number]]  (Abnormal) Collected:  04/24/17 0954    Specimen:  Blood Updated:  04/24/17 1145     Glucose 226 (H) mg/dL       RN NotifiedMeter: [id number]: [id number] ELBA PETERSEN             Imaging Results (last 24 hours)     ** No results found for the last 24 hours. **          Assessment/Plan     Principal Problem:    Chest pain    Active Problems:    Pulmonary embolism    Coronary arteriosclerosis in native artery    Morbid obesity    Obstructive sleep apnea syndrome    Hypertension    Hyperlipidemia    Chronic back pain    Seizures    Type 2 diabetes mellitus    Continue with anitcoagulation     Rehan Griffiths MD  04/24/17  1:23 PM

## 2017-04-25 VITALS
BODY MASS INDEX: 44.1 KG/M2 | HEIGHT: 71 IN | TEMPERATURE: 96.5 F | WEIGHT: 315 LBS | HEART RATE: 74 BPM | RESPIRATION RATE: 18 BRPM | OXYGEN SATURATION: 93 % | SYSTOLIC BLOOD PRESSURE: 146 MMHG | DIASTOLIC BLOOD PRESSURE: 70 MMHG

## 2017-04-25 LAB
GLUCOSE BLDC GLUCOMTR-MCNC: 169 MG/DL (ref 70–130)
GLUCOSE BLDC GLUCOMTR-MCNC: 192 MG/DL (ref 70–130)
GLUCOSE BLDC GLUCOMTR-MCNC: 204 MG/DL (ref 70–130)

## 2017-04-25 PROCEDURE — 97165 OT EVAL LOW COMPLEX 30 MIN: CPT

## 2017-04-25 PROCEDURE — 94760 N-INVAS EAR/PLS OXIMETRY 1: CPT

## 2017-04-25 PROCEDURE — G8988 SELF CARE GOAL STATUS: HCPCS

## 2017-04-25 PROCEDURE — 63710000001 INSULIN ASPART PER 5 UNITS: Performed by: INTERNAL MEDICINE

## 2017-04-25 PROCEDURE — G8987 SELF CARE CURRENT STATUS: HCPCS

## 2017-04-25 PROCEDURE — 94799 UNLISTED PULMONARY SVC/PX: CPT

## 2017-04-25 PROCEDURE — 82962 GLUCOSE BLOOD TEST: CPT

## 2017-04-25 PROCEDURE — 97110 THERAPEUTIC EXERCISES: CPT

## 2017-04-25 RX ORDER — CLOPIDOGREL BISULFATE 75 MG/1
75 TABLET ORAL DAILY
Qty: 30 TABLET | Refills: 1 | Status: ON HOLD | OUTPATIENT
Start: 2017-04-25 | End: 2017-09-22

## 2017-04-25 RX ADMIN — METOPROLOL SUCCINATE 100 MG: 100 TABLET, EXTENDED RELEASE ORAL at 08:46

## 2017-04-25 RX ADMIN — AMLODIPINE BESYLATE 10 MG: 10 TABLET ORAL at 08:46

## 2017-04-25 RX ADMIN — SUCRALFATE 1 G: 1 TABLET ORAL at 11:34

## 2017-04-25 RX ADMIN — INSULIN ASPART 3 UNITS: 100 INJECTION, SOLUTION INTRAVENOUS; SUBCUTANEOUS at 16:40

## 2017-04-25 RX ADMIN — BUDESONIDE 0.5 MG: 0.5 INHALANT RESPIRATORY (INHALATION) at 09:48

## 2017-04-25 RX ADMIN — METFORMIN HYDROCHLORIDE 1000 MG: 500 TABLET ORAL at 08:46

## 2017-04-25 RX ADMIN — IPRATROPIUM BROMIDE AND ALBUTEROL SULFATE 3 ML: 2.5; .5 SOLUTION RESPIRATORY (INHALATION) at 10:19

## 2017-04-25 RX ADMIN — IPRATROPIUM BROMIDE AND ALBUTEROL SULFATE 3 ML: 2.5; .5 SOLUTION RESPIRATORY (INHALATION) at 11:34

## 2017-04-25 RX ADMIN — PANTOPRAZOLE SODIUM 40 MG: 40 TABLET, DELAYED RELEASE ORAL at 08:47

## 2017-04-25 RX ADMIN — RIVAROXABAN 15 MG: 15 TABLET, FILM COATED ORAL at 08:46

## 2017-04-25 RX ADMIN — INSULIN ASPART 3 UNITS: 100 INJECTION, SOLUTION INTRAVENOUS; SUBCUTANEOUS at 11:35

## 2017-04-25 RX ADMIN — PRASUGREL HYDROCHLORIDE 10 MG: 10 TABLET, FILM COATED ORAL at 08:46

## 2017-04-25 RX ADMIN — SUCRALFATE 1 G: 1 TABLET ORAL at 08:46

## 2017-04-25 RX ADMIN — ASPIRIN 81 MG: 81 TABLET, CHEWABLE ORAL at 08:46

## 2017-04-25 RX ADMIN — IPRATROPIUM BROMIDE AND ALBUTEROL SULFATE 3 ML: 2.5; .5 SOLUTION RESPIRATORY (INHALATION) at 09:48

## 2017-04-25 RX ADMIN — BUDESONIDE 0.5 MG: 0.5 INHALANT RESPIRATORY (INHALATION) at 06:41

## 2017-04-25 RX ADMIN — INSULIN ASPART 5 UNITS: 100 INJECTION, SOLUTION INTRAVENOUS; SUBCUTANEOUS at 08:47

## 2017-04-25 RX ADMIN — IPRATROPIUM BROMIDE AND ALBUTEROL SULFATE 3 ML: 2.5; .5 SOLUTION RESPIRATORY (INHALATION) at 06:37

## 2017-04-25 RX ADMIN — ISOSORBIDE MONONITRATE 120 MG: 60 TABLET, EXTENDED RELEASE ORAL at 08:46

## 2017-04-25 RX ADMIN — LISINOPRIL 40 MG: 40 TABLET ORAL at 08:46

## 2017-04-25 RX ADMIN — RANOLAZINE 1000 MG: 500 TABLET, FILM COATED, EXTENDED RELEASE ORAL at 08:46

## 2017-04-25 NOTE — PLAN OF CARE
Problem: Patient Care Overview (Adult)  Goal: Plan of Care Review  Outcome: Ongoing (interventions implemented as appropriate)    04/25/17 0540   Coping/Psychosocial Response Interventions   Plan Of Care Reviewed With patient   Patient Care Overview   Progress no change       Goal: Adult Individualization and Mutuality  Outcome: Ongoing (interventions implemented as appropriate)  Goal: Discharge Needs Assessment  Outcome: Ongoing (interventions implemented as appropriate)    Problem: Pain, Acute (Adult)  Goal: Identify Related Risk Factors and Signs and Symptoms  Outcome: Ongoing (interventions implemented as appropriate)  Goal: Acceptable Pain Control/Comfort Level  Outcome: Ongoing (interventions implemented as appropriate)

## 2017-04-25 NOTE — DISCHARGE SUMMARY
Discharge Summary  Rehan Griffiths MD  Hospitalist     Date of Discharge:  4/25/2017    Discharge Diagnosis:    Principal Problem:    Chest pain    Active Problems:    R upper lobe Pulmonary embolism    Coronary arteriosclerosis in native artery    Morbid obesity /BMI > 60    Obstructive sleep apnea syndrome    Hypertension    Hyperlipidemia    Chronic back pain    Seizures    Type 2 diabetes mellitus      Presenting Problem/History of Present Illness    dyspnea    Hospital Course    Patient is a 36 y.o. male presented for dyspnea / chest pain. He was diagnosed with a R upper lobe PE on the admission CTA. He improved with the help on anticoagulation, nebulized treatments. He is able to ambulate a little with the help of a wheelchair and maintain an O2 sat of 92 to 95 % on RAir at rest.      Consults:   Consults     Date and Time Order Name Status Description    4/23/2017 1049 Inpatient Consult to Cardiothoracic Surgery      4/22/2017 0351 Hospitalist (on-call MD unless specified)      3/28/2017 1304 Inpatient Consult to Cardiothoracic Surgery Completed     3/25/2017 1039 Inpatient Consult to Gastroenterology Completed     3/25/2017 0921 Inpatient Consult to Cardiology Completed           Pertinent Test Results: radiology: CT scan: R upper lung pulmonary embolism    Condition on Discharge:  stable    Vital Signs  Temp:  [96.5 °F (35.8 °C)-97.8 °F (36.6 °C)] 96.5 °F (35.8 °C)  Heart Rate:  [66-76] 74  Resp:  [14-22] 18  BP: (116-158)/(56-74) 146/70    Physical Exam:  Physical Exam   Constitutional: He is oriented to person, place, and time.   Extreme morbid obesity   HENT:   Head: Normocephalic and atraumatic.   Eyes: EOM are normal. Pupils are equal, round, and reactive to light. No scleral icterus.   Cardiovascular: Normal rate and regular rhythm.    Pulmonary/Chest: Effort normal. No respiratory distress. He has no wheezes.   Abdominal: Soft. Bowel sounds are normal. He exhibits no distension. There is no guarding.    Musculoskeletal: He exhibits edema. He exhibits no tenderness.   Neurological: He is alert and oriented to person, place, and time. No cranial nerve deficit.   Skin: Skin is dry.   Psychiatric: He has a normal mood and affect. His behavior is normal.   Vitals reviewed.      Discharge Disposition  Home or Self Care    Discharge Medications   Yordy Hansen   Home Medication Instructions ROCKY:[id number]    Printed on:04/25/17 9238   Medication Information                      albuterol (PROVENTIL HFA;VENTOLIN HFA) 108 (90 BASE) MCG/ACT inhaler  Inhale 2 puffs as needed for wheezing.             amLODIPine (NORVASC) 10 MG tablet  Take 10 mg by mouth daily.             Canagliflozin (INVOKANA) 100 MG tablet  Take 1 tablet by mouth daily.             clopidogrel (PLAVIX) 75 MG tablet  Take 1 tablet by mouth Daily.             ipratropium-albuterol (DUO-NEB) 0.5-2.5 mg/mL nebulizer  Inhale 3 mL Every 4 (Four) Hours As Needed.             isosorbide mononitrate (IMDUR) 60 MG 24 hr tablet  Take 120 mg by mouth Daily.             Liraglutide (VICTOZA) 18 MG/3ML solution pen-injector  Inject  under the skin.             lisinopril (PRINIVIL,ZESTRIL) 20 MG tablet  Take 40 mg by mouth Daily.             metFORMIN (GLUCOPHAGE) 1000 MG tablet  Take 1,000 mg by mouth 2 (Two) Times a Day With Meals.             metoprolol succinate XL (TOPROL-XL) 100 MG 24 hr tablet  Take 1 tablet by mouth Daily.             MICROLET LANCETS misc  USE TO TEST BLOOD SUGAR EVERY DAY AND AS NEEDED             nitroglycerin (NITROSTAT) 0.4 MG SL tablet  Place 1 tablet under the tongue Every 5 (Five) Minutes As Needed for chest pain.             ondansetron ODT (ZOFRAN-ODT) 4 MG disintegrating tablet  Take 1 tablet by mouth Every 6 (Six) Hours As Needed for Nausea or Vomiting.             pantoprazole (PROTONIX) 40 MG EC tablet  Take 40 mg by mouth Daily.             pramipexole (MIRAPEX) 1 MG tablet  Take 1 mg by mouth Every Night. Taking 2mg  daily             pravastatin (PRAVACHOL) 40 MG tablet  Take 80 mg by mouth Every Night.             ranolazine (RANEXA) 1000 MG 12 hr tablet  Take 1 tablet by mouth Every 12 (Twelve) Hours.             rivaroxaban (XARELTO) 15 MG tablet  Take 1 tablet by mouth 2 (Two) Times a Day With Meals.             sucralfate (CARAFATE) 1 G tablet  Take 1 tablet by mouth 4 (Four) Times a Day.             traZODone (DESYREL) 300 MG tablet  Take 300 mg by mouth.                 Discharge Diet:   Diet Instructions     Diet: Regular, Consistent Carbohydrate; Thin Liquids, No Restrictions       Discharge Diet:   Regular  Consistent Carbohydrate      Fluid Consistency:  Thin Liquids, No Restrictions                 Activity at Discharge:   Activity Instructions     Activity as Tolerated                     Follow-up Appointments  Future Appointments  Date Time Provider Department Center   5/30/2017 2:40 PM LALA Winters [facility] None   6/13/2017 11:45 AM Edwige Briceno MD [facility] None     Additional Instructions for the Follow-ups that You Need to Schedule     Additional Discharge Follow-Up (Specify Provider)    As directed    To:  Yolanda Bautista [facility] as scheduled       Discharge Follow-up with PCP    As directed    Follow Up Details:  PCP in 1 week                    Rehan Griffiths MD  04/25/17  5:12 PM

## 2017-04-25 NOTE — PLAN OF CARE
Problem: Patient Care Overview (Adult)  Goal: Plan of Care Review  Outcome: Ongoing (interventions implemented as appropriate)    04/25/17 1303   Coping/Psychosocial Response Interventions   Plan Of Care Reviewed With patient   Outcome Evaluation   Outcome Summary/Follow up Plan Initial OT evaluation completed. Pt presents to be independent in ADLs and mobility. OT provided education regarding energy conservation, HEP exercises, and safety with functional mobility. OT goals met and pt will be discharged from OT services.         Problem: Inpatient Occupational Therapy  Goal: Patient Education Goal STG- OT  Outcome: Ongoing (interventions implemented as appropriate)    04/25/17 1303   Patient Education OT STG   Patient Education OT STG, Date Established 04/25/17   Patient Education OT STG, Time to Achieve 1 day   Patient Education OT STG, Education Type HEP;energy conservation;adaptive breathing   Patient Education OT STG, Education Understanding independent;verbalizes understanding

## 2017-04-25 NOTE — THERAPY DISCHARGE NOTE
Acute Care - Occupational Therapy Discharge Summary  [facility]     Patient Name: Yordy Hansen  : 1980  MRN: [id number]    Today's Date: 2017  Onset of Illness/Injury or Date of Surgery Date: 17    Date of Referral to OT: 17  Referring Physician: Dr. Griffiths      Admit Date: 2017        OT Recommendation and Plan    Visit Dx:    ICD-10-CM ICD-9-CM   1. Chest pain, unspecified type R07.9 786.50   2. Shortness of breath R06.02 786.05   3. Other pulmonary embolism with acute cor pulmonale, unspecified chronicity I26.09 415.19     415.0   4. Morbid obesity, unspecified obesity type E66.01 278.01   5. Impaired mobility and ADLs Z74.09 799.89               Time Calculation- OT       17 1443          Time Calculation- OT    OT Start Time 1303  -RM      OT Stop Time 1327  -RM      OT Time Calculation (min) 24 min  -RM      Total Timed Code Minutes- OT 9 minute(s)  -RM      OT Received On 17  -RM        User Key  (r) = Recorded By, (t) = Taken By, (c) = Cosigned By    Initials Name Provider Type    ROBERTO Lora OT Occupational Therapist                  OT Goals       17 1446 17 1303       Patient Education OT STG    Patient Education OT STG, Date Established  17  -RM     Patient Education OT STG, Time to Achieve  1 day  -RM     Patient Education OT STG, Education Type  HEP;energy conservation;adaptive breathing  -RM     Patient Education OT STG, Education Understanding  independent;verbalizes understanding  -RM     Patient Education OT STG, Date Goal Reviewed 17  -RM      Patient Education OT STG Outcome goal met  -RM        User Key  (r) = Recorded By, (t) = Taken By, (c) = Cosigned By    Initials Name Provider Type    ROBERTO Lora OT Occupational Therapist                Outcome Measures       17 1303          How much help from another is currently needed...    Putting on and taking off regular lower body clothing? 4  -RM       Bathing (including washing, rinsing, and drying) 4  -RM      Toileting (which includes using toilet bed pan or urinal) 4  -RM      Putting on and taking off regular upper body clothing 4  -RM      Taking care of personal grooming (such as brushing teeth) 4  -RM      Eating meals 4  -RM      Score 24  -RM      Functional Assessment    Outcome Measure Options AM-PAC 6 Clicks Daily Activity (OT)  -RM        User Key  (r) = Recorded By, (t) = Taken By, (c) = Cosigned By    Initials Name Provider Type     Carolina Lora OT Occupational Therapist          Therapy Charges for Today     Code Description Service Date Service Provider Modifiers Qty    [id number]  OT SELFCARE CURRENT 4/25/2017 Carolina Lora OT ,  1    [id number]  OT SELFCARE PROJECTED 4/25/2017 TOMMY Christopher,  1    [id number]  OT EVAL LOW COMPLEXITY 1 4/25/2017 Carolina Lora OT GO 1    [id number]  OT THER PROC EA 15 MIN 4/25/2017 Carolina Lora OT  1          OT Discharge Summary  Anticipated Discharge Disposition: home  Reason for Discharge: All goals achieved  Outcomes Achieved: Able to achieve all goals within established timeline  Discharge Destination: other (comment) (remain in hospital)      Carolina Lora OT  4/25/2017

## 2017-04-25 NOTE — PROGRESS NOTES
Acute Care - Occupational Therapy Initial Evaluation  [facility]     Patient Name: Yordy Hansen  : 1980  MRN: [id number]  Today's Date: 2017  Onset of Illness/Injury or Date of Surgery Date: 17  Date of Referral to OT: 17  Referring Physician: Dr. Griffiths    Admit Date: 2017       ICD-10-CM ICD-9-CM   1. Chest pain, unspecified type R07.9 786.50   2. Shortness of breath R06.02 786.05   3. Other pulmonary embolism with acute cor pulmonale, unspecified chronicity I26.09 415.19     415.0   4. Morbid obesity, unspecified obesity type E66.01 278.01   5. Impaired mobility and ADLs Z74.09 799.89     Patient Active Problem List   Diagnosis   • Hyperlipidemia   • Coronary arteriosclerosis in native artery   • Morbid obesity   • Obstructive sleep apnea syndrome   • Chest pain   • Chronic back pain   • GERD (gastroesophageal reflux disease)   • Hypertension   • RLS (restless legs syndrome)   • Seizures   • Pulmonary embolism   • Type 2 diabetes mellitus     Past Medical History:   Diagnosis Date   • Allergic rhinitis    • Asthma    • Chest pain    • Chronic back pain    • Chronic pain    • GERD (gastroesophageal reflux disease)    • Hypertension    • Low back pain    • Lumbosacral strain    • Morbid obesity    • Pulmonary embolism    • RLS (restless legs syndrome)    • Seizures      Past Surgical History:   Procedure Laterality Date   • CARDIAC CATHETERIZATION N/A 3/15/2017    Procedure: Left Heart Cath;  Surgeon: Edwige Briceno MD;  Location: [facility] INVASIVE LOCATION;  Service:    • CARDIAC CATHETERIZATION N/A 3/15/2017    Procedure: Stent SOPHIA coronary;  Surgeon: Edwige Briceno MD;  Location: [facility] CATH INVASIVE LOCATION;  Service:    • MS RT/LT HEART CATHETERS N/A 3/15/2017    Procedure: Percutaneous Coronary Intervention;  Surgeon: Edwige Briceno MD;  Location: [facility] CATH INVASIVE LOCATION;  Service: Cardiovascular   • TRANSESOPHAGEAL ECHOCARDIOGRAM ([place]ICA)            OT ASSESSMENT  FLOWSHEET (last 72 hours)      OT Evaluation       04/25/17 1303 04/24/17 1302 04/24/17 1301 04/22/17 1744       Rehab Evaluation    Document Type evaluation  -RM        Subjective Information agree to therapy;no complaints  -RM        Patient Effort, Rehab Treatment excellent  -RM        Symptoms Noted During/After Treatment shortness of breath  -RM        General Information    Patient Profile Review yes  -RM        Onset of Illness/Injury or Date of Surgery Date 04/22/17  -        Referring Physician Dr. Griffiths  -        General Observations Pt seated in recliner, fully dressed, alert.  Independent in mobility; cooperative  -RM        Precautions/Limitations no known precautions/limitations  -RM        Prior Level of Function independent:;all household mobility;community mobility;ADL's;driving  -RM        Equipment Currently Used at Home  wheelchair;shower chair;walker, standard;respiratory supplies  -MR       Plans/Goals Discussed With patient  -RM        Risks Reviewed patient:  -RM        Benefits Reviewed patient:  -RM        Living Environment    Lives With spouse  -RM spouse  -MR       Living Arrangements house  -RM mobile home  -MR       Home Accessibility stairs to enter home  -RM        Number of Stairs to Enter Home 3  -RM        Stair Railings at Home outside, present on left side  -        Transportation Available car;family or friend will provide  - car;family or friend will provide  -  car;family or friend will provide  -     Clinical Impression    Date of Referral to OT 04/25/17  -        OT Diagnosis Impaired mobility & ADLs  -RM        Patient/Family Goals Statement Return home  -RM        Criteria for Skilled Therapeutic Interventions Met no problems identified which require skilled intervention  -RM        Anticipated Discharge Disposition home  -RM        Functional Level Prior    Ambulation 0-->independent  -RM  0-->independent  -MR      Transferring 0-->independent  -RM   0-->independent  -MR      Toileting 0-->independent  -RM  0-->independent  -MR      Bathing 0-->independent  -RM  0-->independent  -MR      Dressing 0-->independent  -RM  0-->independent  -MR      Eating 0-->independent  -RM  0-->independent  -MR      Communication 0-->understands/communicates without difficulty  -RM  0-->understands/communicates without difficulty  -MR      Swallowing 0-->swallows foods/liquids without difficulty  -RM  0-->swallows foods/liquids without difficulty  -MR      Vital Signs    Pre Systolic BP Rehab 118  -RM        Pre Treatment Diastolic BP 55  -RM        Post Systolic BP Rehab 165  -RM        Post Treatment Diastolic BP 55  -RM        Pretreatment Heart Rate (beats/min) 75  -RM        Intratreatment Heart Rate (beats/min) 74  -RM        Posttreatment Heart Rate (beats/min) 78  -RM        Pre SpO2 (%) 94  -RM        O2 Delivery Pre Treatment room air  -RM        Intra SpO2 (%) 94  -RM        O2 Delivery Intra Treatment room air  -RM        Post SpO2 (%) 94  -RM        O2 Delivery Post Treatment room air  -RM        Pre Patient Position Sitting  -RM        Intra Patient Position Sitting  -RM        Post Patient Position Sitting  -RM        Pain Assessment    Pain Assessment No/denies pain  -RM        Vision Assessment/Intervention    Visual Impairment WFL with corrective lenses  -RM        Cognitive Assessment/Intervention    Current Cognitive/Communication Assessment functional  -RM        Orientation Status oriented x 4  -RM        Follows Commands/Answers Questions 100% of the time  -RM        Personal Safety WNL/WFL  -RM        ROM (Range of Motion)    General ROM no range of motion deficits identified  -RM        MMT (Manual Muscle Testing)    General MMT Assessment no strength deficits identified  -RM        Bed Mobility, Assessment/Treatment    Bed Mobility, Assistive Device bed rails  -RM        Bed Mobility, Roll Left, [place] conditional independence  -RM        Bed  Mobility, Roll Right, [place] conditional independence  -RM        Bed Mobility, Scoot/Bridge, [place] conditional independence  -RM        Bed Mob, Supine to Sit, [place] independent  -RM        Bed Mob, Sit to Supine, [place] independent  -RM        Transfer Assessment/Treatment    Transfers, Sit-Stand [place] independent  -RM        Transfers, Stand-Sit [place] independent  -RM        Toilet Transfer, [place] independent  -RM        Functional Mobility    Functional Mobility- Ind. Level independent  -RM        Functional Mobility-Distance (Feet) --   15  -RM        ADL Assessment/Intervention    Additional Documentation --   Pt reports independence with dressing, bathing, toileting  -RM        Therapy Exercises    Bilateral Upper Extremity AROM:;elbow flexion/extension;hand pumps;pronation/supination;shoulder abduction/adduction;shoulder extension/flexion;shoulder ER/IR;shoulder protraction/retraction  -RM        Sensory Assessment/Intervention    Light Touch LUE;RUE  -RM        LUE Light Touch WNL  -RM        RUE Light Touch WNL  -RM        Positioning and Restraints    Pre-Treatment Position sitting in chair/recliner  -RM        Post Treatment Position chair  -RM        In Chair encouraged to call for assist  -RM          User Key  (r) = Recorded By, (t) = Taken By, (c) = Cosigned By    Initials Name Effective Dates     Manuela Palomino RN 04/03/17 -     MR Yanely Chua 10/17/16 -     RM Carolina Lora OT 03/22/17 -            Occupational Therapy Education     Title: PT OT SLP Therapies (Done)     Topic: Occupational Therapy (Done)     Point: ADL training (Done)    Description: Instruct learner(s) on proper safety adaptation and remediation techniques during self care or transfers.   Instruct in proper use of assistive devices.    Learning Progress Summary    Learner Readiness Method Response Comment Documented by Status   Patient Acceptance NADEEN SUAREZ DU   04/25/17  1438 Done               Point: Home exercise program (Done)    Description: Instruct learner(s) on appropriate technique for monitoring, assisting and/or progressing therapeutic exercises/activities.    Learning Progress Summary    Learner Readiness Method Response Comment Documented by Status   Patient Acceptance NADEEN SUAREZ DU   04/25/17 1438 Done               Point: Precautions (Done)    Description: Instruct learner(s) on prescribed precautions during self-care and functional transfers.    Learning Progress Summary    Learner Readiness Method Response Comment Documented by Status   Patient Acceptance NADEEN SUAREZMIN   04/25/17 1438 Done               Point: Body mechanics (Done)    Description: Instruct learner(s) on proper positioning and spine alignment during self-care, functional mobility activities and/or exercises.    Learning Progress Summary    Learner Readiness Method Response Comment Documented by Status   Patient Acceptance NADEEN SUAREZMIN   04/25/17 1438 Done                      User Key     Initials Effective Dates Name Provider Type Discipline     03/22/17 -  Carolina Lora, OT Occupational Therapist OT                  OT Recommendation and Plan  Anticipated Discharge Disposition: home  Plan of Care Review  Plan Of Care Reviewed With: patient  Outcome Summary/Follow up Plan: Initial OT evaluation completed.  Pt presents to be independent in ADLs and mobility.  OT provided education regarding energy conservation, HEP exercises, and safety with functional mobility.  OT goals met and pt will be discharged from OT services.          OT Goals       04/25/17 1303          Patient Education OT STG    Patient Education OT STG, Date Established 04/25/17  -RM      Patient Education OT STG, Time to Achieve 1 day  -RM      Patient Education OT STG, Education Type HEP;energy conservation;adaptive breathing  -RM      Patient Education OT STG, Education Understanding independent;verbalizes understanding  -        User  Key  (r) = Recorded By, (t) = Taken By, (c) = Cosigned By    Initials Name Provider Type    RM Carolina Lora OT Occupational Therapist                Outcome Measures       04/25/17 1303          How much help from another is currently needed...    Putting on and taking off regular lower body clothing? 4  -RM      Bathing (including washing, rinsing, and drying) 4  -RM      Toileting (which includes using toilet bed pan or urinal) 4  -RM      Putting on and taking off regular upper body clothing 4  -RM      Taking care of personal grooming (such as brushing teeth) 4  -RM      Eating meals 4  -RM      Score 24  -RM      Functional Assessment    Outcome Measure Options AM-PAC 6 Clicks Daily Activity (OT)  -RM        User Key  (r) = Recorded By, (t) = Taken By, (c) = Cosigned By    Initials Name Provider Type    ROBERTO Lora OT Occupational Therapist          Time Calculation:   OT Start Time: 1303  OT Stop Time: 1327  OT Time Calculation (min): 24 min    Therapy Charges for Today     Code Description Service Date Service Provider Modifiers Qty    01090880150  OT SELFCARE CURRENT 4/25/2017 Carolina Lora OT GO,  1    [id number]  OT SELFCARE PROJECTED 4/25/2017 Carolina Lora OT GO,  1    [id number]  OT EVAL LOW COMPLEXITY 1 4/25/2017 Carolina Lora OT GO 1    [id number]  OT THER PROC EA 15 MIN 4/25/2017 Carolina Lora OT GO 1          OT G-codes  OT Professional Judgement Used?: Yes  OT Functional Scales Options: AM-PAC 6 Clicks Daily Activity (OT)  Score: 24  Functional Limitation: Self care  Self Care Current Status (): 0 percent impaired, limited or restricted  Self Care Goal Status (): 0 percent impaired, limited or restricted    Carolina Lora OT  4/25/2017

## 2017-04-25 NOTE — PLAN OF CARE
Problem: Respiratory Insufficiency (Adult)  Goal: Effective Ventilation  Outcome: Ongoing (interventions implemented as appropriate)

## 2017-04-25 NOTE — PLAN OF CARE
Problem: Pain, Acute (Adult)  Goal: Identify Related Risk Factors and Signs and Symptoms  Outcome: Outcome(s) achieved Date Met:  04/25/17

## 2017-04-25 NOTE — PLAN OF CARE
Problem: Inpatient Occupational Therapy  Goal: Patient Education Goal STG- OT  Outcome: Outcome(s) achieved Date Met:  04/25/17 04/25/17 1303 04/25/17 1446   Patient Education OT STG   Patient Education OT STG, Date Established 04/25/17 --    Patient Education OT STG, Time to Achieve 1 day --    Patient Education OT STG, Education Type HEP;energy conservation;adaptive breathing --    Patient Education OT STG, Education Understanding independent;verbalizes understanding --    Patient Education OT STG, Date Goal Reviewed --  04/25/17   Patient Education OT STG Outcome --  goal met

## 2017-04-25 NOTE — PLAN OF CARE
Problem: Patient Care Overview (Adult)  Goal: Plan of Care Review  Outcome: Outcome(s) achieved Date Met:  04/25/17

## 2017-04-25 NOTE — PLAN OF CARE
Problem: Respiratory Insufficiency (Adult)  Goal: Acid/Base Balance  Outcome: Ongoing (interventions implemented as appropriate)

## 2017-04-30 ENCOUNTER — APPOINTMENT (OUTPATIENT)
Dept: CT IMAGING | Facility: HOSPITAL | Age: 37
End: 2017-04-30

## 2017-04-30 ENCOUNTER — HOSPITAL ENCOUNTER (OUTPATIENT)
Facility: HOSPITAL | Age: 37
Setting detail: OBSERVATION
Discharge: HOME | End: 2017-05-01
Attending: EMERGENCY MEDICINE | Admitting: HOSPITALIST

## 2017-04-30 DIAGNOSIS — R06.02 SHORTNESS OF BREATH: ICD-10-CM

## 2017-04-30 DIAGNOSIS — R07.9 CHEST PAIN, RULE OUT ACUTE MYOCARDIAL INFARCTION: Primary | ICD-10-CM

## 2017-04-30 DIAGNOSIS — I26.99 OTHER PULMONARY EMBOLISM WITHOUT ACUTE COR PULMONALE, UNSPECIFIED CHRONICITY (HCC): ICD-10-CM

## 2017-04-30 LAB
ALBUMIN SERPL-MCNC: 3.9 G/DL (ref 3.4–4.8)
ALBUMIN/GLOB SERPL: 1 G/DL (ref 1.1–1.8)
ALP SERPL-CCNC: 74 U/L (ref 38–126)
ALT SERPL W P-5'-P-CCNC: 33 U/L (ref 21–72)
ANION GAP SERPL CALCULATED.3IONS-SCNC: 12 MMOL/L (ref 5–15)
APTT PPP: 43.9 SECONDS (ref 20–40.3)
ARTERIAL PATENCY WRIST A: ABNORMAL
AST SERPL-CCNC: 33 U/L (ref 17–59)
ATMOSPHERIC PRESS: ABNORMAL MMHG
BASE EXCESS BLDA CALC-SCNC: 0.3 MMOL/L (ref -2.4–2.4)
BASOPHILS # BLD AUTO: 0.02 10*3/MM3 (ref 0–0.2)
BASOPHILS NFR BLD AUTO: 0.3 % (ref 0–2)
BDY SITE: ABNORMAL
BILIRUB SERPL-MCNC: 0.6 MG/DL (ref 0.2–1.3)
BUN BLD-MCNC: 9 MG/DL (ref 7–21)
BUN/CREAT SERPL: 11.4 (ref 7–25)
CA-I BLD-MCNC: 4.7 MG/DL (ref 4.5–4.9)
CALCIUM SPEC-SCNC: 8.8 MG/DL (ref 8.4–10.2)
CHLORIDE SERPL-SCNC: 97 MMOL/L (ref 95–110)
CK MB SERPL-CCNC: 2 NG/ML (ref 0–5)
CK SERPL-CCNC: 213 U/L (ref 55–170)
CO2 BLDA-SCNC: 26.9 MMOL/L (ref 23–27)
CO2 SERPL-SCNC: 27 MMOL/L (ref 22–31)
CREAT BLD-MCNC: 0.79 MG/DL (ref 0.7–1.3)
DEPRECATED RDW RBC AUTO: 43.4 FL (ref 35.1–43.9)
EOSINOPHIL # BLD AUTO: 0.26 10*3/MM3 (ref 0–0.7)
EOSINOPHIL NFR BLD AUTO: 3.3 % (ref 0–7)
ERYTHROCYTE [DISTWIDTH] IN BLOOD BY AUTOMATED COUNT: 14.6 % (ref 11.5–14.5)
GFR SERPL CREATININE-BSD FRML MDRD: 110 ML/MIN/1.73 (ref 70–162)
GLOBULIN UR ELPH-MCNC: 3.8 GM/DL (ref 2.3–3.5)
GLUCOSE BLD-MCNC: 176 MG/DL (ref 60–100)
GLUCOSE BLDA-MCNC: 147 MMOL/L
GLUCOSE BLDC GLUCOMTR-MCNC: 155 MG/DL (ref 70–130)
GLUCOSE BLDC GLUCOMTR-MCNC: 187 MG/DL (ref 70–130)
HCO3 BLDA-SCNC: 25.6 MMOL/L (ref 22–26)
HCT VFR BLD AUTO: 37.3 % (ref 39–49)
HCT VFR BLD CALC: 40 % (ref 40–54)
HGB BLD-MCNC: 12.9 G/DL (ref 13.7–17.3)
HGB BLDA-MCNC: 13.5 G/DL (ref 14–18)
HOLD SPECIMEN: NORMAL
IMM GRANULOCYTES # BLD: 0.15 10*3/MM3 (ref 0–0.02)
IMM GRANULOCYTES NFR BLD: 1.9 % (ref 0–0.5)
INR PPP: 1.5 (ref 0.8–1.2)
LYMPHOCYTES # BLD AUTO: 1.22 10*3/MM3 (ref 0.6–4.2)
LYMPHOCYTES NFR BLD AUTO: 15.5 % (ref 10–50)
MCH RBC QN AUTO: 28.1 PG (ref 26.5–34)
MCHC RBC AUTO-ENTMCNC: 34.6 G/DL (ref 31.5–36.3)
MCV RBC AUTO: 81.3 FL (ref 80–98)
MODALITY: ABNORMAL
MONOCYTES # BLD AUTO: 0.57 10*3/MM3 (ref 0–0.9)
MONOCYTES NFR BLD AUTO: 7.3 % (ref 0–12)
NEUTROPHILS # BLD AUTO: 5.64 10*3/MM3 (ref 2–8.6)
NEUTROPHILS NFR BLD AUTO: 71.7 % (ref 37–80)
PCO2 BLDA: 43.5 MM HG (ref 35–45)
PH BLDA: 7.39 PH UNITS (ref 7.35–7.45)
PLATELET # BLD AUTO: 190 10*3/MM3 (ref 150–450)
PMV BLD AUTO: 8.8 FL (ref 8–12)
PO2 BLDA: 83.5 MM HG (ref 80–105)
POTASSIUM BLD-SCNC: 4 MMOL/L (ref 3.5–5.1)
POTASSIUM BLDA-SCNC: 4.02 MMOL/L (ref 3.6–4.9)
PROT SERPL-MCNC: 7.7 G/DL (ref 6.3–8.6)
PROTHROMBIN TIME: 18.1 SECONDS (ref 11.1–15.3)
RBC # BLD AUTO: 4.59 10*6/MM3 (ref 4.37–5.74)
SAO2 % BLDCOA: 95.9 %
SODIUM BLD-SCNC: 136 MMOL/L (ref 137–145)
SODIUM BLDA-SCNC: 135.5 MMOL/L (ref 138–146)
TROPONIN I SERPL-MCNC: <0.012 NG/ML
WBC NRBC COR # BLD: 7.86 10*3/MM3 (ref 3.2–9.8)

## 2017-04-30 PROCEDURE — 25010000002 ONDANSETRON PER 1 MG: Performed by: EMERGENCY MEDICINE

## 2017-04-30 PROCEDURE — 85610 PROTHROMBIN TIME: CPT | Performed by: EMERGENCY MEDICINE

## 2017-04-30 PROCEDURE — 82962 GLUCOSE BLOOD TEST: CPT

## 2017-04-30 PROCEDURE — 82803 BLOOD GASES ANY COMBINATION: CPT | Performed by: EMERGENCY MEDICINE

## 2017-04-30 PROCEDURE — G0378 HOSPITAL OBSERVATION PER HR: HCPCS

## 2017-04-30 PROCEDURE — 96376 TX/PRO/DX INJ SAME DRUG ADON: CPT

## 2017-04-30 PROCEDURE — 93005 ELECTROCARDIOGRAM TRACING: CPT

## 2017-04-30 PROCEDURE — 80053 COMPREHEN METABOLIC PANEL: CPT | Performed by: EMERGENCY MEDICINE

## 2017-04-30 PROCEDURE — 84484 ASSAY OF TROPONIN QUANT: CPT | Performed by: FAMILY MEDICINE

## 2017-04-30 PROCEDURE — 82550 ASSAY OF CK (CPK): CPT | Performed by: EMERGENCY MEDICINE

## 2017-04-30 PROCEDURE — 0 IOPAMIDOL PER 1 ML: Performed by: EMERGENCY MEDICINE

## 2017-04-30 PROCEDURE — 84484 ASSAY OF TROPONIN QUANT: CPT | Performed by: EMERGENCY MEDICINE

## 2017-04-30 PROCEDURE — 85025 COMPLETE CBC W/AUTO DIFF WBC: CPT | Performed by: EMERGENCY MEDICINE

## 2017-04-30 PROCEDURE — 96375 TX/PRO/DX INJ NEW DRUG ADDON: CPT

## 2017-04-30 PROCEDURE — 93010 ELECTROCARDIOGRAM REPORT: CPT | Performed by: INTERNAL MEDICINE

## 2017-04-30 PROCEDURE — 96374 THER/PROPH/DIAG INJ IV PUSH: CPT

## 2017-04-30 PROCEDURE — 25010000002 MORPHINE PER 10 MG: Performed by: FAMILY MEDICINE

## 2017-04-30 PROCEDURE — 93005 ELECTROCARDIOGRAM TRACING: CPT | Performed by: FAMILY MEDICINE

## 2017-04-30 PROCEDURE — 25010000002 MORPHINE PER 10 MG: Performed by: EMERGENCY MEDICINE

## 2017-04-30 PROCEDURE — 71275 CT ANGIOGRAPHY CHEST: CPT

## 2017-04-30 PROCEDURE — 94640 AIRWAY INHALATION TREATMENT: CPT

## 2017-04-30 PROCEDURE — 85730 THROMBOPLASTIN TIME PARTIAL: CPT | Performed by: EMERGENCY MEDICINE

## 2017-04-30 PROCEDURE — 82553 CREATINE MB FRACTION: CPT | Performed by: EMERGENCY MEDICINE

## 2017-04-30 PROCEDURE — 99284 EMERGENCY DEPT VISIT MOD MDM: CPT

## 2017-04-30 RX ORDER — ASPIRIN 325 MG
325 TABLET ORAL ONCE
Status: COMPLETED | OUTPATIENT
Start: 2017-04-30 | End: 2017-04-30

## 2017-04-30 RX ORDER — IPRATROPIUM BROMIDE AND ALBUTEROL SULFATE 2.5; .5 MG/3ML; MG/3ML
3 SOLUTION RESPIRATORY (INHALATION)
Status: DISCONTINUED | OUTPATIENT
Start: 2017-04-30 | End: 2017-05-01 | Stop reason: HOSPADM

## 2017-04-30 RX ORDER — LISINOPRIL 40 MG/1
40 TABLET ORAL EVERY MORNING
Status: DISCONTINUED | OUTPATIENT
Start: 2017-05-01 | End: 2017-05-01 | Stop reason: HOSPADM

## 2017-04-30 RX ORDER — ASPIRIN 81 MG/1
324 TABLET, CHEWABLE ORAL ONCE
Status: DISCONTINUED | OUTPATIENT
Start: 2017-04-30 | End: 2017-05-01 | Stop reason: HOSPADM

## 2017-04-30 RX ORDER — IPRATROPIUM BROMIDE AND ALBUTEROL SULFATE 2.5; .5 MG/3ML; MG/3ML
3 SOLUTION RESPIRATORY (INHALATION) ONCE
Status: COMPLETED | OUTPATIENT
Start: 2017-04-30 | End: 2017-04-30

## 2017-04-30 RX ORDER — ALBUTEROL SULFATE 90 UG/1
2 AEROSOL, METERED RESPIRATORY (INHALATION) AS NEEDED
Status: DISCONTINUED | OUTPATIENT
Start: 2017-04-30 | End: 2017-04-30 | Stop reason: CLARIF

## 2017-04-30 RX ORDER — PANTOPRAZOLE SODIUM 40 MG/1
40 TABLET, DELAYED RELEASE ORAL NIGHTLY
Status: DISCONTINUED | OUTPATIENT
Start: 2017-04-30 | End: 2017-05-01 | Stop reason: HOSPADM

## 2017-04-30 RX ORDER — NITROGLYCERIN 0.4 MG/1
0.4 TABLET SUBLINGUAL
Status: DISCONTINUED | OUTPATIENT
Start: 2017-04-30 | End: 2017-05-01 | Stop reason: HOSPADM

## 2017-04-30 RX ORDER — MORPHINE SULFATE 4 MG/ML
4 INJECTION, SOLUTION INTRAMUSCULAR; INTRAVENOUS ONCE
Status: COMPLETED | OUTPATIENT
Start: 2017-04-30 | End: 2017-04-30

## 2017-04-30 RX ORDER — ASPIRIN 81 MG/1
81 TABLET ORAL DAILY
Status: DISCONTINUED | OUTPATIENT
Start: 2017-05-01 | End: 2017-05-01 | Stop reason: HOSPADM

## 2017-04-30 RX ORDER — AMLODIPINE BESYLATE 10 MG/1
10 TABLET ORAL NIGHTLY
Status: DISCONTINUED | OUTPATIENT
Start: 2017-04-30 | End: 2017-05-01 | Stop reason: HOSPADM

## 2017-04-30 RX ORDER — TRAZODONE HYDROCHLORIDE 150 MG/1
300 TABLET ORAL NIGHTLY
Status: DISCONTINUED | OUTPATIENT
Start: 2017-04-30 | End: 2017-05-01 | Stop reason: HOSPADM

## 2017-04-30 RX ORDER — MORPHINE SULFATE 4 MG/ML
4 INJECTION, SOLUTION INTRAMUSCULAR; INTRAVENOUS EVERY 4 HOURS PRN
Status: DISCONTINUED | OUTPATIENT
Start: 2017-04-30 | End: 2017-05-01 | Stop reason: HOSPADM

## 2017-04-30 RX ORDER — ONDANSETRON 4 MG/1
4 TABLET, ORALLY DISINTEGRATING ORAL EVERY 6 HOURS PRN
Status: DISCONTINUED | OUTPATIENT
Start: 2017-04-30 | End: 2017-05-01 | Stop reason: HOSPADM

## 2017-04-30 RX ORDER — ISOSORBIDE MONONITRATE 120 MG/1
120 TABLET, EXTENDED RELEASE ORAL EVERY MORNING
COMMUNITY
End: 2017-06-01 | Stop reason: SDUPTHER

## 2017-04-30 RX ORDER — ONDANSETRON 2 MG/ML
4 INJECTION INTRAMUSCULAR; INTRAVENOUS ONCE
Status: COMPLETED | OUTPATIENT
Start: 2017-04-30 | End: 2017-04-30

## 2017-04-30 RX ORDER — NALOXONE HCL 0.4 MG/ML
0.4 VIAL (ML) INJECTION
Status: DISCONTINUED | OUTPATIENT
Start: 2017-04-30 | End: 2017-05-01 | Stop reason: HOSPADM

## 2017-04-30 RX ORDER — FAMOTIDINE 20 MG/1
20 TABLET, FILM COATED ORAL 2 TIMES DAILY
Status: DISCONTINUED | OUTPATIENT
Start: 2017-04-30 | End: 2017-05-01 | Stop reason: HOSPADM

## 2017-04-30 RX ORDER — PRAVASTATIN SODIUM 40 MG
80 TABLET ORAL NIGHTLY
Status: DISCONTINUED | OUTPATIENT
Start: 2017-04-30 | End: 2017-05-01 | Stop reason: HOSPADM

## 2017-04-30 RX ORDER — METOPROLOL SUCCINATE 100 MG/1
100 TABLET, EXTENDED RELEASE ORAL DAILY
Status: DISCONTINUED | OUTPATIENT
Start: 2017-04-30 | End: 2017-05-01 | Stop reason: HOSPADM

## 2017-04-30 RX ORDER — RANOLAZINE 500 MG/1
1000 TABLET, EXTENDED RELEASE ORAL EVERY 12 HOURS SCHEDULED
Status: DISCONTINUED | OUTPATIENT
Start: 2017-04-30 | End: 2017-05-01 | Stop reason: HOSPADM

## 2017-04-30 RX ORDER — ISOSORBIDE MONONITRATE 60 MG/1
120 TABLET, EXTENDED RELEASE ORAL EVERY MORNING
Status: DISCONTINUED | OUTPATIENT
Start: 2017-05-01 | End: 2017-05-01 | Stop reason: HOSPADM

## 2017-04-30 RX ORDER — LISINOPRIL 40 MG/1
40 TABLET ORAL EVERY MORNING
COMMUNITY
End: 2017-06-01 | Stop reason: SDUPTHER

## 2017-04-30 RX ORDER — CLOPIDOGREL BISULFATE 75 MG/1
75 TABLET ORAL DAILY
Status: DISCONTINUED | OUTPATIENT
Start: 2017-04-30 | End: 2017-05-01 | Stop reason: HOSPADM

## 2017-04-30 RX ORDER — SODIUM CHLORIDE 0.9 % (FLUSH) 0.9 %
1-10 SYRINGE (ML) INJECTION AS NEEDED
Status: DISCONTINUED | OUTPATIENT
Start: 2017-04-30 | End: 2017-05-01 | Stop reason: HOSPADM

## 2017-04-30 RX ORDER — PRAMIPEXOLE DIHYDROCHLORIDE 1 MG/1
2 TABLET ORAL NIGHTLY
Status: DISCONTINUED | OUTPATIENT
Start: 2017-04-30 | End: 2017-05-01 | Stop reason: HOSPADM

## 2017-04-30 RX ORDER — LORAZEPAM 0.5 MG/1
0.5 TABLET ORAL EVERY 6 HOURS PRN
Status: DISCONTINUED | OUTPATIENT
Start: 2017-04-30 | End: 2017-05-01 | Stop reason: HOSPADM

## 2017-04-30 RX ORDER — ALBUTEROL SULFATE 2.5 MG/3ML
2.5 SOLUTION RESPIRATORY (INHALATION) AS NEEDED
Status: DISCONTINUED | OUTPATIENT
Start: 2017-04-30 | End: 2017-05-01 | Stop reason: HOSPADM

## 2017-04-30 RX ORDER — LORAZEPAM 2 MG/ML
0.5 INJECTION INTRAMUSCULAR EVERY 6 HOURS PRN
Status: DISCONTINUED | OUTPATIENT
Start: 2017-04-30 | End: 2017-05-01 | Stop reason: HOSPADM

## 2017-04-30 RX ORDER — PRAVASTATIN SODIUM 80 MG/1
80 TABLET ORAL NIGHTLY
COMMUNITY
End: 2017-06-01 | Stop reason: SDUPTHER

## 2017-04-30 RX ORDER — SUCRALFATE 1 G/1
1 TABLET ORAL 4 TIMES DAILY
Status: DISCONTINUED | OUTPATIENT
Start: 2017-04-30 | End: 2017-05-01 | Stop reason: HOSPADM

## 2017-04-30 RX ADMIN — RIVAROXABAN 15 MG: 15 TABLET, FILM COATED ORAL at 17:45

## 2017-04-30 RX ADMIN — NITROGLYCERIN 1 INCH: 20 OINTMENT TOPICAL at 11:28

## 2017-04-30 RX ADMIN — CLOPIDOGREL BISULFATE 75 MG: 75 TABLET ORAL at 17:45

## 2017-04-30 RX ADMIN — PRAMIPEXOLE DIHYDROCHLORIDE 2 MG: 1 TABLET ORAL at 20:17

## 2017-04-30 RX ADMIN — PRAVASTATIN SODIUM 80 MG: 40 TABLET ORAL at 20:17

## 2017-04-30 RX ADMIN — FAMOTIDINE 20 MG: 20 TABLET ORAL at 17:45

## 2017-04-30 RX ADMIN — METFORMIN HYDROCHLORIDE 1000 MG: 500 TABLET ORAL at 17:45

## 2017-04-30 RX ADMIN — MORPHINE SULFATE 4 MG: 4 INJECTION, SOLUTION INTRAMUSCULAR; INTRAVENOUS at 17:45

## 2017-04-30 RX ADMIN — TRAZODONE HYDROCHLORIDE 300 MG: 150 TABLET ORAL at 20:17

## 2017-04-30 RX ADMIN — SUCRALFATE 1 G: 1 TABLET ORAL at 20:17

## 2017-04-30 RX ADMIN — IPRATROPIUM BROMIDE AND ALBUTEROL SULFATE 3 ML: .5; 3 SOLUTION RESPIRATORY (INHALATION) at 11:22

## 2017-04-30 RX ADMIN — MORPHINE SULFATE 4 MG: 4 INJECTION, SOLUTION INTRAMUSCULAR; INTRAVENOUS at 15:16

## 2017-04-30 RX ADMIN — METOPROLOL SUCCINATE 100 MG: 100 TABLET, EXTENDED RELEASE ORAL at 17:45

## 2017-04-30 RX ADMIN — MORPHINE SULFATE 4 MG: 4 INJECTION, SOLUTION INTRAMUSCULAR; INTRAVENOUS at 11:28

## 2017-04-30 RX ADMIN — RANOLAZINE 1000 MG: 500 TABLET, FILM COATED, EXTENDED RELEASE ORAL at 20:28

## 2017-04-30 RX ADMIN — PANTOPRAZOLE SODIUM 40 MG: 40 TABLET, DELAYED RELEASE ORAL at 20:17

## 2017-04-30 RX ADMIN — Medication 10 ML: at 22:37

## 2017-04-30 RX ADMIN — SUCRALFATE 1 G: 1 TABLET ORAL at 17:45

## 2017-04-30 RX ADMIN — IPRATROPIUM BROMIDE AND ALBUTEROL SULFATE 3 ML: 2.5; .5 SOLUTION RESPIRATORY (INHALATION) at 19:22

## 2017-04-30 RX ADMIN — ONDANSETRON 4 MG: 2 INJECTION INTRAMUSCULAR; INTRAVENOUS at 11:28

## 2017-04-30 RX ADMIN — AMLODIPINE BESYLATE 10 MG: 10 TABLET ORAL at 20:17

## 2017-04-30 RX ADMIN — IOPAMIDOL 94 ML: 755 INJECTION, SOLUTION INTRAVENOUS at 12:25

## 2017-04-30 RX ADMIN — MORPHINE SULFATE 4 MG: 4 INJECTION, SOLUTION INTRAMUSCULAR; INTRAVENOUS at 22:37

## 2017-04-30 RX ADMIN — Medication 10 ML: at 17:45

## 2017-04-30 RX ADMIN — ASPIRIN 325 MG: 325 TABLET, COATED ORAL at 11:28

## 2017-05-01 VITALS
WEIGHT: 315 LBS | RESPIRATION RATE: 20 BRPM | HEIGHT: 71 IN | HEART RATE: 63 BPM | TEMPERATURE: 97.9 F | DIASTOLIC BLOOD PRESSURE: 59 MMHG | SYSTOLIC BLOOD PRESSURE: 127 MMHG | OXYGEN SATURATION: 98 % | BODY MASS INDEX: 44.1 KG/M2

## 2017-05-01 LAB
ANION GAP SERPL CALCULATED.3IONS-SCNC: 9 MMOL/L (ref 5–15)
BUN BLD-MCNC: 13 MG/DL (ref 7–21)
BUN/CREAT SERPL: 12.6 (ref 7–25)
CALCIUM SPEC-SCNC: 8.4 MG/DL (ref 8.4–10.2)
CHLORIDE SERPL-SCNC: 94 MMOL/L (ref 95–110)
CO2 SERPL-SCNC: 28 MMOL/L (ref 22–31)
CREAT BLD-MCNC: 1.03 MG/DL (ref 0.7–1.3)
DEPRECATED RDW RBC AUTO: 43.5 FL (ref 35.1–43.9)
ERYTHROCYTE [DISTWIDTH] IN BLOOD BY AUTOMATED COUNT: 14.7 % (ref 11.5–14.5)
GFR SERPL CREATININE-BSD FRML MDRD: 81 ML/MIN/1.73 (ref 60–162)
GLUCOSE BLD-MCNC: 183 MG/DL (ref 60–100)
GLUCOSE BLDC GLUCOMTR-MCNC: 152 MG/DL (ref 70–130)
GLUCOSE BLDC GLUCOMTR-MCNC: 195 MG/DL (ref 70–130)
HCT VFR BLD AUTO: 34.5 % (ref 39–49)
HGB BLD-MCNC: 11.8 G/DL (ref 13.7–17.3)
MCH RBC QN AUTO: 27.8 PG (ref 26.5–34)
MCHC RBC AUTO-ENTMCNC: 34.2 G/DL (ref 31.5–36.3)
MCV RBC AUTO: 81.2 FL (ref 80–98)
PLATELET # BLD AUTO: 192 10*3/MM3 (ref 150–450)
PMV BLD AUTO: 8.6 FL (ref 8–12)
POTASSIUM BLD-SCNC: 4.1 MMOL/L (ref 3.5–5.1)
RBC # BLD AUTO: 4.25 10*6/MM3 (ref 4.37–5.74)
SODIUM BLD-SCNC: 131 MMOL/L (ref 137–145)
TROPONIN I SERPL-MCNC: <0.012 NG/ML
WBC NRBC COR # BLD: 8.3 10*3/MM3 (ref 3.2–9.8)

## 2017-05-01 PROCEDURE — 94799 UNLISTED PULMONARY SVC/PX: CPT

## 2017-05-01 PROCEDURE — 93005 ELECTROCARDIOGRAM TRACING: CPT

## 2017-05-01 PROCEDURE — 84484 ASSAY OF TROPONIN QUANT: CPT | Performed by: FAMILY MEDICINE

## 2017-05-01 PROCEDURE — 93010 ELECTROCARDIOGRAM REPORT: CPT | Performed by: INTERNAL MEDICINE

## 2017-05-01 PROCEDURE — 96375 TX/PRO/DX INJ NEW DRUG ADDON: CPT

## 2017-05-01 PROCEDURE — 80048 BASIC METABOLIC PNL TOTAL CA: CPT | Performed by: NURSE PRACTITIONER

## 2017-05-01 PROCEDURE — 82962 GLUCOSE BLOOD TEST: CPT

## 2017-05-01 PROCEDURE — 25010000002 LORAZEPAM PER 2 MG: Performed by: FAMILY MEDICINE

## 2017-05-01 PROCEDURE — 85027 COMPLETE CBC AUTOMATED: CPT | Performed by: NURSE PRACTITIONER

## 2017-05-01 PROCEDURE — 25010000002 MORPHINE PER 10 MG: Performed by: FAMILY MEDICINE

## 2017-05-01 PROCEDURE — G0378 HOSPITAL OBSERVATION PER HR: HCPCS

## 2017-05-01 PROCEDURE — 96376 TX/PRO/DX INJ SAME DRUG ADON: CPT

## 2017-05-01 PROCEDURE — 94760 N-INVAS EAR/PLS OXIMETRY 1: CPT

## 2017-05-01 RX ORDER — HYDROCODONE BITARTRATE AND ACETAMINOPHEN 10; 325 MG/1; MG/1
1 TABLET ORAL EVERY 6 HOURS PRN
Qty: 20 TABLET | Refills: 0 | Status: SHIPPED | OUTPATIENT
Start: 2017-05-01 | End: 2017-06-01

## 2017-05-01 RX ADMIN — SUCRALFATE 1 G: 1 TABLET ORAL at 11:11

## 2017-05-01 RX ADMIN — LISINOPRIL 40 MG: 40 TABLET ORAL at 06:20

## 2017-05-01 RX ADMIN — MORPHINE SULFATE 4 MG: 4 INJECTION, SOLUTION INTRAMUSCULAR; INTRAVENOUS at 11:13

## 2017-05-01 RX ADMIN — SUCRALFATE 1 G: 1 TABLET ORAL at 08:22

## 2017-05-01 RX ADMIN — IPRATROPIUM BROMIDE AND ALBUTEROL SULFATE 3 ML: 2.5; .5 SOLUTION RESPIRATORY (INHALATION) at 06:52

## 2017-05-01 RX ADMIN — MORPHINE SULFATE 4 MG: 4 INJECTION, SOLUTION INTRAMUSCULAR; INTRAVENOUS at 06:20

## 2017-05-01 RX ADMIN — RIVAROXABAN 15 MG: 15 TABLET, FILM COATED ORAL at 08:22

## 2017-05-01 RX ADMIN — CLOPIDOGREL BISULFATE 75 MG: 75 TABLET ORAL at 08:23

## 2017-05-01 RX ADMIN — ISOSORBIDE MONONITRATE 120 MG: 60 TABLET, EXTENDED RELEASE ORAL at 06:17

## 2017-05-01 RX ADMIN — FAMOTIDINE 20 MG: 20 TABLET ORAL at 08:23

## 2017-05-01 RX ADMIN — IPRATROPIUM BROMIDE AND ALBUTEROL SULFATE 3 ML: 2.5; .5 SOLUTION RESPIRATORY (INHALATION) at 10:21

## 2017-05-01 RX ADMIN — LORAZEPAM 0.5 MG: 2 INJECTION, SOLUTION INTRAMUSCULAR; INTRAVENOUS at 08:21

## 2017-05-01 RX ADMIN — METOPROLOL SUCCINATE 100 MG: 100 TABLET, EXTENDED RELEASE ORAL at 08:23

## 2017-05-01 RX ADMIN — ASPIRIN 81 MG: 81 TABLET, COATED ORAL at 08:23

## 2017-05-01 RX ADMIN — Medication 10 ML: at 06:20

## 2017-05-01 RX ADMIN — Medication 10 ML: at 08:22

## 2017-05-01 RX ADMIN — Medication 10 ML: at 11:13

## 2017-05-01 RX ADMIN — RANOLAZINE 1000 MG: 500 TABLET, FILM COATED, EXTENDED RELEASE ORAL at 08:23

## 2017-05-03 ENCOUNTER — HOSPITAL ENCOUNTER (OUTPATIENT)
Facility: HOSPITAL | Age: 37
Setting detail: OBSERVATION
Discharge: HOME | End: 2017-05-04
Attending: EMERGENCY MEDICINE | Admitting: INTERNAL MEDICINE

## 2017-05-03 ENCOUNTER — APPOINTMENT (OUTPATIENT)
Dept: GENERAL RADIOLOGY | Facility: HOSPITAL | Age: 37
End: 2017-05-03

## 2017-05-03 DIAGNOSIS — R06.02 SHORTNESS OF BREATH: ICD-10-CM

## 2017-05-03 DIAGNOSIS — R07.2 PRECORDIAL PAIN: Primary | ICD-10-CM

## 2017-05-03 LAB
ALBUMIN SERPL-MCNC: 3.5 G/DL (ref 3.4–4.8)
ALBUMIN/GLOB SERPL: 1 G/DL (ref 1.1–1.8)
ALP SERPL-CCNC: 65 U/L (ref 38–126)
ALT SERPL W P-5'-P-CCNC: 25 U/L (ref 21–72)
ANION GAP SERPL CALCULATED.3IONS-SCNC: 9 MMOL/L (ref 5–15)
APTT PPP: 32.6 SECONDS (ref 20–40.3)
ARTERIAL PATENCY WRIST A: ABNORMAL
AST SERPL-CCNC: 23 U/L (ref 17–59)
ATMOSPHERIC PRESS: ABNORMAL MMHG
BASE EXCESS BLDA CALC-SCNC: 1.5 MMOL/L (ref -2.4–2.4)
BASOPHILS # BLD AUTO: 0.01 10*3/MM3 (ref 0–0.2)
BASOPHILS NFR BLD AUTO: 0.1 % (ref 0–2)
BDY SITE: ABNORMAL
BILIRUB SERPL-MCNC: 0.3 MG/DL (ref 0.2–1.3)
BUN BLD-MCNC: 19 MG/DL (ref 7–21)
BUN/CREAT SERPL: 22.6 (ref 7–25)
CA-I BLD-MCNC: 4.7 MG/DL (ref 4.5–4.9)
CALCIUM SPEC-SCNC: 8.5 MG/DL (ref 8.4–10.2)
CHLORIDE SERPL-SCNC: 101 MMOL/L (ref 95–110)
CK MB SERPL-CCNC: 1.21 NG/ML (ref 0–5)
CK SERPL-CCNC: 172 U/L (ref 55–170)
CO2 BLDA-SCNC: 28 MMOL/L (ref 23–27)
CO2 SERPL-SCNC: 27 MMOL/L (ref 22–31)
CREAT BLD-MCNC: 0.84 MG/DL (ref 0.7–1.3)
D-DIMER, QUANTITATIVE (MAD,POW, STR): 306 NG/ML (FEU) (ref 0–470)
DEPRECATED RDW RBC AUTO: 42.7 FL (ref 35.1–43.9)
EOSINOPHIL # BLD AUTO: 0.24 10*3/MM3 (ref 0–0.7)
EOSINOPHIL NFR BLD AUTO: 3.2 % (ref 0–7)
ERYTHROCYTE [DISTWIDTH] IN BLOOD BY AUTOMATED COUNT: 14.4 % (ref 11.5–14.5)
GFR SERPL CREATININE-BSD FRML MDRD: 102 ML/MIN/1.73 (ref 60–162)
GLOBULIN UR ELPH-MCNC: 3.5 GM/DL (ref 2.3–3.5)
GLUCOSE BLD-MCNC: 121 MG/DL (ref 60–100)
GLUCOSE BLDA-MCNC: 125 MMOL/L
GLUCOSE BLDC GLUCOMTR-MCNC: 145 MG/DL (ref 70–130)
GLUCOSE BLDC GLUCOMTR-MCNC: 215 MG/DL (ref 70–130)
HCO3 BLDA-SCNC: 26.6 MMOL/L (ref 22–26)
HCT VFR BLD AUTO: 36 % (ref 39–49)
HCT VFR BLD CALC: 37 % (ref 40–54)
HGB BLD-MCNC: 12.2 G/DL (ref 13.7–17.3)
HGB BLDA-MCNC: 12.6 G/DL (ref 14–18)
IMM GRANULOCYTES # BLD: 0.18 10*3/MM3 (ref 0–0.02)
IMM GRANULOCYTES NFR BLD: 2.4 % (ref 0–0.5)
INR PPP: 0.97 (ref 0.8–1.2)
LYMPHOCYTES # BLD AUTO: 1.43 10*3/MM3 (ref 0.6–4.2)
LYMPHOCYTES NFR BLD AUTO: 19 % (ref 10–50)
MCH RBC QN AUTO: 27.6 PG (ref 26.5–34)
MCHC RBC AUTO-ENTMCNC: 33.9 G/DL (ref 31.5–36.3)
MCV RBC AUTO: 81.4 FL (ref 80–98)
MODALITY: ABNORMAL
MONOCYTES # BLD AUTO: 0.6 10*3/MM3 (ref 0–0.9)
MONOCYTES NFR BLD AUTO: 8 % (ref 0–12)
NEUTROPHILS # BLD AUTO: 5.07 10*3/MM3 (ref 2–8.6)
NEUTROPHILS NFR BLD AUTO: 67.3 % (ref 37–80)
NT-PROBNP SERPL-MCNC: 120 PG/ML (ref 0–450)
PCO2 BLDA: 43.9 MM HG (ref 35–45)
PH BLDA: 7.4 PH UNITS (ref 7.35–7.45)
PLATELET # BLD AUTO: 210 10*3/MM3 (ref 150–450)
PMV BLD AUTO: 8.8 FL (ref 8–12)
PO2 BLDA: 103.4 MM HG (ref 80–105)
POTASSIUM BLD-SCNC: 4.1 MMOL/L (ref 3.5–5.1)
POTASSIUM BLDA-SCNC: 3.97 MMOL/L (ref 3.6–4.9)
PROT SERPL-MCNC: 7 G/DL (ref 6.3–8.6)
PROTHROMBIN TIME: 12.8 SECONDS (ref 11.1–15.3)
RBC # BLD AUTO: 4.42 10*6/MM3 (ref 4.37–5.74)
SAO2 % BLDCOA: 97.6 % (ref 94–100)
SODIUM BLD-SCNC: 137 MMOL/L (ref 137–145)
SODIUM BLDA-SCNC: 136.4 MMOL/L (ref 138–146)
TROPONIN I SERPL-MCNC: <0.012 NG/ML
WBC NRBC COR # BLD: 7.53 10*3/MM3 (ref 3.2–9.8)
WHOLE BLOOD HOLD SPECIMEN: NORMAL

## 2017-05-03 PROCEDURE — 82962 GLUCOSE BLOOD TEST: CPT

## 2017-05-03 PROCEDURE — 63710000001 INSULIN ASPART PER 5 UNITS: Performed by: INTERNAL MEDICINE

## 2017-05-03 PROCEDURE — 94799 UNLISTED PULMONARY SVC/PX: CPT

## 2017-05-03 PROCEDURE — 94760 N-INVAS EAR/PLS OXIMETRY 1: CPT

## 2017-05-03 PROCEDURE — 71020 HC CHEST PA AND LATERAL: CPT

## 2017-05-03 PROCEDURE — G0378 HOSPITAL OBSERVATION PER HR: HCPCS

## 2017-05-03 PROCEDURE — 96361 HYDRATE IV INFUSION ADD-ON: CPT

## 2017-05-03 PROCEDURE — 85025 COMPLETE CBC W/AUTO DIFF WBC: CPT | Performed by: EMERGENCY MEDICINE

## 2017-05-03 PROCEDURE — 25010000002 MORPHINE PER 10 MG: Performed by: INTERNAL MEDICINE

## 2017-05-03 PROCEDURE — 85610 PROTHROMBIN TIME: CPT | Performed by: EMERGENCY MEDICINE

## 2017-05-03 PROCEDURE — 80053 COMPREHEN METABOLIC PANEL: CPT | Performed by: EMERGENCY MEDICINE

## 2017-05-03 PROCEDURE — 36600 WITHDRAWAL OF ARTERIAL BLOOD: CPT

## 2017-05-03 PROCEDURE — 93005 ELECTROCARDIOGRAM TRACING: CPT | Performed by: INTERNAL MEDICINE

## 2017-05-03 PROCEDURE — 82553 CREATINE MB FRACTION: CPT | Performed by: EMERGENCY MEDICINE

## 2017-05-03 PROCEDURE — 82803 BLOOD GASES ANY COMBINATION: CPT | Performed by: EMERGENCY MEDICINE

## 2017-05-03 PROCEDURE — 93010 ELECTROCARDIOGRAM REPORT: CPT | Performed by: INTERNAL MEDICINE

## 2017-05-03 PROCEDURE — 25010000002 MORPHINE PER 10 MG: Performed by: EMERGENCY MEDICINE

## 2017-05-03 PROCEDURE — 82550 ASSAY OF CK (CPK): CPT | Performed by: EMERGENCY MEDICINE

## 2017-05-03 PROCEDURE — 25010000002 ONDANSETRON PER 1 MG: Performed by: EMERGENCY MEDICINE

## 2017-05-03 PROCEDURE — 96375 TX/PRO/DX INJ NEW DRUG ADDON: CPT

## 2017-05-03 PROCEDURE — 96376 TX/PRO/DX INJ SAME DRUG ADON: CPT

## 2017-05-03 PROCEDURE — 94640 AIRWAY INHALATION TREATMENT: CPT

## 2017-05-03 PROCEDURE — 93005 ELECTROCARDIOGRAM TRACING: CPT | Performed by: EMERGENCY MEDICINE

## 2017-05-03 PROCEDURE — 25010000002 LORAZEPAM PER 2 MG: Performed by: INTERNAL MEDICINE

## 2017-05-03 PROCEDURE — 85379 FIBRIN DEGRADATION QUANT: CPT | Performed by: EMERGENCY MEDICINE

## 2017-05-03 PROCEDURE — 83880 ASSAY OF NATRIURETIC PEPTIDE: CPT | Performed by: EMERGENCY MEDICINE

## 2017-05-03 PROCEDURE — 85730 THROMBOPLASTIN TIME PARTIAL: CPT | Performed by: EMERGENCY MEDICINE

## 2017-05-03 PROCEDURE — 84484 ASSAY OF TROPONIN QUANT: CPT | Performed by: EMERGENCY MEDICINE

## 2017-05-03 PROCEDURE — 99284 EMERGENCY DEPT VISIT MOD MDM: CPT

## 2017-05-03 PROCEDURE — 96374 THER/PROPH/DIAG INJ IV PUSH: CPT

## 2017-05-03 PROCEDURE — 25010000002 HYDROMORPHONE PER 4 MG: Performed by: EMERGENCY MEDICINE

## 2017-05-03 RX ORDER — ONDANSETRON 4 MG/1
4 TABLET, ORALLY DISINTEGRATING ORAL EVERY 6 HOURS PRN
Status: DISCONTINUED | OUTPATIENT
Start: 2017-05-03 | End: 2017-05-04 | Stop reason: HOSPADM

## 2017-05-03 RX ORDER — HYDROCODONE BITARTRATE AND ACETAMINOPHEN 10; 325 MG/1; MG/1
1 TABLET ORAL EVERY 6 HOURS PRN
Status: DISCONTINUED | OUTPATIENT
Start: 2017-05-03 | End: 2017-05-03

## 2017-05-03 RX ORDER — NITROGLYCERIN 0.4 MG/1
0.4 TABLET SUBLINGUAL
Status: DISCONTINUED | OUTPATIENT
Start: 2017-05-03 | End: 2017-05-04 | Stop reason: HOSPADM

## 2017-05-03 RX ORDER — SODIUM CHLORIDE 0.9 % (FLUSH) 0.9 %
1-10 SYRINGE (ML) INJECTION AS NEEDED
Status: DISCONTINUED | OUTPATIENT
Start: 2017-05-03 | End: 2017-05-04 | Stop reason: HOSPADM

## 2017-05-03 RX ORDER — RANOLAZINE 500 MG/1
1000 TABLET, EXTENDED RELEASE ORAL EVERY 12 HOURS SCHEDULED
Status: DISCONTINUED | OUTPATIENT
Start: 2017-05-03 | End: 2017-05-04 | Stop reason: HOSPADM

## 2017-05-03 RX ORDER — NICOTINE POLACRILEX 4 MG
15 LOZENGE BUCCAL
Status: DISCONTINUED | OUTPATIENT
Start: 2017-05-03 | End: 2017-05-04 | Stop reason: HOSPADM

## 2017-05-03 RX ORDER — IPRATROPIUM BROMIDE AND ALBUTEROL SULFATE 2.5; .5 MG/3ML; MG/3ML
3 SOLUTION RESPIRATORY (INHALATION)
Status: DISCONTINUED | OUTPATIENT
Start: 2017-05-03 | End: 2017-05-04 | Stop reason: HOSPADM

## 2017-05-03 RX ORDER — MORPHINE SULFATE 4 MG/ML
4 INJECTION, SOLUTION INTRAMUSCULAR; INTRAVENOUS ONCE
Status: COMPLETED | OUTPATIENT
Start: 2017-05-03 | End: 2017-05-03

## 2017-05-03 RX ORDER — LORAZEPAM 2 MG/ML
1 INJECTION INTRAMUSCULAR EVERY 4 HOURS PRN
Status: DISCONTINUED | OUTPATIENT
Start: 2017-05-03 | End: 2017-05-04 | Stop reason: HOSPADM

## 2017-05-03 RX ORDER — SODIUM CHLORIDE 0.9 % (FLUSH) 0.9 %
10 SYRINGE (ML) INJECTION AS NEEDED
Status: DISCONTINUED | OUTPATIENT
Start: 2017-05-03 | End: 2017-05-04 | Stop reason: HOSPADM

## 2017-05-03 RX ORDER — ALBUTEROL SULFATE 2.5 MG/3ML
SOLUTION RESPIRATORY (INHALATION) AS NEEDED
Status: DISCONTINUED | OUTPATIENT
Start: 2017-05-03 | End: 2017-05-04 | Stop reason: HOSPADM

## 2017-05-03 RX ORDER — SODIUM CHLORIDE 9 MG/ML
125 INJECTION, SOLUTION INTRAVENOUS CONTINUOUS
Status: DISCONTINUED | OUTPATIENT
Start: 2017-05-03 | End: 2017-05-03

## 2017-05-03 RX ORDER — LISINOPRIL 40 MG/1
40 TABLET ORAL EVERY MORNING
Status: DISCONTINUED | OUTPATIENT
Start: 2017-05-03 | End: 2017-05-04 | Stop reason: HOSPADM

## 2017-05-03 RX ORDER — DEXTROSE MONOHYDRATE 25 G/50ML
25 INJECTION, SOLUTION INTRAVENOUS
Status: DISCONTINUED | OUTPATIENT
Start: 2017-05-03 | End: 2017-05-04 | Stop reason: HOSPADM

## 2017-05-03 RX ORDER — MORPHINE SULFATE 4 MG/ML
4 INJECTION, SOLUTION INTRAMUSCULAR; INTRAVENOUS EVERY 4 HOURS PRN
Status: DISCONTINUED | OUTPATIENT
Start: 2017-05-03 | End: 2017-05-04 | Stop reason: HOSPADM

## 2017-05-03 RX ORDER — NITROGLYCERIN 0.4 MG/1
0.4 TABLET SUBLINGUAL
Status: DISCONTINUED | OUTPATIENT
Start: 2017-05-03 | End: 2017-05-03 | Stop reason: SDUPTHER

## 2017-05-03 RX ORDER — OXYCODONE HYDROCHLORIDE AND ACETAMINOPHEN 5; 325 MG/1; MG/1
1 TABLET ORAL EVERY 4 HOURS PRN
Status: DISCONTINUED | OUTPATIENT
Start: 2017-05-03 | End: 2017-05-04 | Stop reason: HOSPADM

## 2017-05-03 RX ORDER — ONDANSETRON 2 MG/ML
4 INJECTION INTRAMUSCULAR; INTRAVENOUS ONCE
Status: COMPLETED | OUTPATIENT
Start: 2017-05-03 | End: 2017-05-03

## 2017-05-03 RX ORDER — AMLODIPINE BESYLATE 10 MG/1
10 TABLET ORAL NIGHTLY
Status: DISCONTINUED | OUTPATIENT
Start: 2017-05-03 | End: 2017-05-04 | Stop reason: HOSPADM

## 2017-05-03 RX ORDER — METOPROLOL SUCCINATE 100 MG/1
100 TABLET, EXTENDED RELEASE ORAL NIGHTLY
Status: DISCONTINUED | OUTPATIENT
Start: 2017-05-03 | End: 2017-05-04 | Stop reason: HOSPADM

## 2017-05-03 RX ORDER — PRAVASTATIN SODIUM 40 MG
80 TABLET ORAL NIGHTLY
Status: DISCONTINUED | OUTPATIENT
Start: 2017-05-03 | End: 2017-05-04 | Stop reason: HOSPADM

## 2017-05-03 RX ORDER — PANTOPRAZOLE SODIUM 40 MG/1
40 TABLET, DELAYED RELEASE ORAL NIGHTLY
Status: DISCONTINUED | OUTPATIENT
Start: 2017-05-03 | End: 2017-05-04 | Stop reason: HOSPADM

## 2017-05-03 RX ORDER — PRAMIPEXOLE DIHYDROCHLORIDE 1 MG/1
1 TABLET ORAL NIGHTLY
Status: DISCONTINUED | OUTPATIENT
Start: 2017-05-03 | End: 2017-05-04 | Stop reason: HOSPADM

## 2017-05-03 RX ORDER — CLOPIDOGREL BISULFATE 75 MG/1
75 TABLET ORAL DAILY
Status: DISCONTINUED | OUTPATIENT
Start: 2017-05-03 | End: 2017-05-04 | Stop reason: HOSPADM

## 2017-05-03 RX ORDER — ASPIRIN 81 MG/1
324 TABLET, CHEWABLE ORAL ONCE
Status: COMPLETED | OUTPATIENT
Start: 2017-05-03 | End: 2017-05-03

## 2017-05-03 RX ORDER — ISOSORBIDE MONONITRATE 60 MG/1
120 TABLET, EXTENDED RELEASE ORAL EVERY MORNING
Status: DISCONTINUED | OUTPATIENT
Start: 2017-05-03 | End: 2017-05-04 | Stop reason: HOSPADM

## 2017-05-03 RX ADMIN — OXYCODONE HYDROCHLORIDE AND ACETAMINOPHEN 1 TABLET: 5; 325 TABLET ORAL at 08:47

## 2017-05-03 RX ADMIN — PRAMIPEXOLE DIHYDROCHLORIDE 1 MG: 1 TABLET ORAL at 21:48

## 2017-05-03 RX ADMIN — MORPHINE SULFATE 4 MG: 4 INJECTION, SOLUTION INTRAMUSCULAR; INTRAVENOUS at 09:37

## 2017-05-03 RX ADMIN — LISINOPRIL 40 MG: 40 TABLET ORAL at 09:38

## 2017-05-03 RX ADMIN — LORAZEPAM 1 MG: 2 INJECTION, SOLUTION INTRAMUSCULAR; INTRAVENOUS at 15:28

## 2017-05-03 RX ADMIN — AMLODIPINE BESYLATE 10 MG: 10 TABLET ORAL at 21:48

## 2017-05-03 RX ADMIN — LORAZEPAM 1 MG: 2 INJECTION, SOLUTION INTRAMUSCULAR; INTRAVENOUS at 09:37

## 2017-05-03 RX ADMIN — INSULIN ASPART 4 UNITS: 100 INJECTION, SOLUTION INTRAVENOUS; SUBCUTANEOUS at 18:19

## 2017-05-03 RX ADMIN — ISOSORBIDE MONONITRATE 120 MG: 60 TABLET, EXTENDED RELEASE ORAL at 09:38

## 2017-05-03 RX ADMIN — METOPROLOL SUCCINATE 100 MG: 100 TABLET, EXTENDED RELEASE ORAL at 21:47

## 2017-05-03 RX ADMIN — SODIUM CHLORIDE 125 ML/HR: 900 INJECTION, SOLUTION INTRAVENOUS at 02:42

## 2017-05-03 RX ADMIN — METFORMIN HYDROCHLORIDE 1000 MG: 500 TABLET ORAL at 18:20

## 2017-05-03 RX ADMIN — OXYCODONE HYDROCHLORIDE AND ACETAMINOPHEN 1 TABLET: 5; 325 TABLET ORAL at 21:53

## 2017-05-03 RX ADMIN — RIVAROXABAN 15 MG: 15 TABLET, FILM COATED ORAL at 09:38

## 2017-05-03 RX ADMIN — ASPIRIN 324 MG: 81 TABLET, CHEWABLE ORAL at 02:18

## 2017-05-03 RX ADMIN — IPRATROPIUM BROMIDE AND ALBUTEROL SULFATE 3 ML: 2.5; .5 SOLUTION RESPIRATORY (INHALATION) at 20:10

## 2017-05-03 RX ADMIN — PANTOPRAZOLE SODIUM 40 MG: 40 TABLET, DELAYED RELEASE ORAL at 21:48

## 2017-05-03 RX ADMIN — RANOLAZINE 1000 MG: 500 TABLET, FILM COATED, EXTENDED RELEASE ORAL at 21:47

## 2017-05-03 RX ADMIN — ALBUTEROL SULFATE 2.5 MG: 2.5 SOLUTION RESPIRATORY (INHALATION) at 08:51

## 2017-05-03 RX ADMIN — IPRATROPIUM BROMIDE AND ALBUTEROL SULFATE 3 ML: 2.5; .5 SOLUTION RESPIRATORY (INHALATION) at 11:33

## 2017-05-03 RX ADMIN — ONDANSETRON 4 MG: 2 INJECTION INTRAMUSCULAR; INTRAVENOUS at 02:44

## 2017-05-03 RX ADMIN — RIVAROXABAN 15 MG: 15 TABLET, FILM COATED ORAL at 18:20

## 2017-05-03 RX ADMIN — PRAVASTATIN SODIUM 80 MG: 40 TABLET ORAL at 21:48

## 2017-05-03 RX ADMIN — Medication 10 ML: at 05:11

## 2017-05-03 RX ADMIN — METFORMIN HYDROCHLORIDE 1000 MG: 500 TABLET ORAL at 09:38

## 2017-05-03 RX ADMIN — HYDROMORPHONE HYDROCHLORIDE 1 MG: 1 INJECTION, SOLUTION INTRAMUSCULAR; INTRAVENOUS; SUBCUTANEOUS at 05:10

## 2017-05-03 RX ADMIN — LORAZEPAM 1 MG: 2 INJECTION, SOLUTION INTRAMUSCULAR; INTRAVENOUS at 21:53

## 2017-05-03 RX ADMIN — NITROGLYCERIN 1 INCH: 20 OINTMENT TOPICAL at 02:19

## 2017-05-03 RX ADMIN — MORPHINE SULFATE 4 MG: 4 INJECTION, SOLUTION INTRAMUSCULAR; INTRAVENOUS at 02:43

## 2017-05-03 RX ADMIN — CLOPIDOGREL BISULFATE 75 MG: 75 TABLET ORAL at 10:01

## 2017-05-03 RX ADMIN — OXYCODONE HYDROCHLORIDE AND ACETAMINOPHEN 1 TABLET: 5; 325 TABLET ORAL at 15:28

## 2017-05-03 RX ADMIN — MORPHINE SULFATE 4 MG: 4 INJECTION, SOLUTION INTRAMUSCULAR; INTRAVENOUS at 15:28

## 2017-05-03 RX ADMIN — INSULIN ASPART 2 UNITS: 100 INJECTION, SOLUTION INTRAVENOUS; SUBCUTANEOUS at 21:54

## 2017-05-03 RX ADMIN — RANOLAZINE 1000 MG: 500 TABLET, FILM COATED, EXTENDED RELEASE ORAL at 09:38

## 2017-05-04 VITALS
DIASTOLIC BLOOD PRESSURE: 81 MMHG | HEART RATE: 70 BPM | RESPIRATION RATE: 20 BRPM | OXYGEN SATURATION: 94 % | HEIGHT: 71 IN | WEIGHT: 315 LBS | BODY MASS INDEX: 44.1 KG/M2 | SYSTOLIC BLOOD PRESSURE: 132 MMHG | TEMPERATURE: 97.6 F

## 2017-05-04 LAB
GLUCOSE BLDC GLUCOMTR-MCNC: 144 MG/DL (ref 70–130)
GLUCOSE BLDC GLUCOMTR-MCNC: 174 MG/DL (ref 70–130)
GLUCOSE BLDC GLUCOMTR-MCNC: 207 MG/DL (ref 70–130)

## 2017-05-04 PROCEDURE — 96376 TX/PRO/DX INJ SAME DRUG ADON: CPT

## 2017-05-04 PROCEDURE — 94799 UNLISTED PULMONARY SVC/PX: CPT

## 2017-05-04 PROCEDURE — 82962 GLUCOSE BLOOD TEST: CPT

## 2017-05-04 PROCEDURE — 25010000002 MORPHINE PER 10 MG: Performed by: INTERNAL MEDICINE

## 2017-05-04 PROCEDURE — G0378 HOSPITAL OBSERVATION PER HR: HCPCS

## 2017-05-04 RX ORDER — LORAZEPAM 1 MG/1
1 TABLET ORAL 2 TIMES DAILY PRN
Qty: 20 TABLET | Refills: 0 | Status: SHIPPED | OUTPATIENT
Start: 2017-05-04 | End: 2017-06-03

## 2017-05-04 RX ADMIN — MORPHINE SULFATE 4 MG: 4 INJECTION, SOLUTION INTRAMUSCULAR; INTRAVENOUS at 01:51

## 2017-05-04 RX ADMIN — CLOPIDOGREL BISULFATE 75 MG: 75 TABLET ORAL at 08:15

## 2017-05-04 RX ADMIN — IPRATROPIUM BROMIDE AND ALBUTEROL SULFATE 3 ML: 2.5; .5 SOLUTION RESPIRATORY (INHALATION) at 08:06

## 2017-05-04 RX ADMIN — RIVAROXABAN 15 MG: 15 TABLET, FILM COATED ORAL at 08:15

## 2017-05-04 RX ADMIN — METFORMIN HYDROCHLORIDE 1000 MG: 500 TABLET ORAL at 08:15

## 2017-05-04 RX ADMIN — RANOLAZINE 1000 MG: 500 TABLET, FILM COATED, EXTENDED RELEASE ORAL at 08:15

## 2017-05-04 RX ADMIN — LISINOPRIL 40 MG: 40 TABLET ORAL at 06:38

## 2017-05-04 RX ADMIN — ISOSORBIDE MONONITRATE 120 MG: 60 TABLET, EXTENDED RELEASE ORAL at 06:38

## 2017-05-04 RX ADMIN — OXYCODONE HYDROCHLORIDE AND ACETAMINOPHEN 1 TABLET: 5; 325 TABLET ORAL at 08:20

## 2017-05-09 ENCOUNTER — HOSPITAL ENCOUNTER (OUTPATIENT)
Facility: HOSPITAL | Age: 37
Setting detail: OBSERVATION
Discharge: HOME | End: 2017-05-10
Attending: EMERGENCY MEDICINE | Admitting: INTERNAL MEDICINE

## 2017-05-09 ENCOUNTER — APPOINTMENT (OUTPATIENT)
Dept: GENERAL RADIOLOGY | Facility: HOSPITAL | Age: 37
End: 2017-05-09

## 2017-05-09 DIAGNOSIS — R07.9 CHEST PAIN, UNSPECIFIED TYPE: Primary | ICD-10-CM

## 2017-05-09 LAB
ALBUMIN SERPL-MCNC: 4 G/DL (ref 3.4–4.8)
ALBUMIN/GLOB SERPL: 1.1 G/DL (ref 1.1–1.8)
ALP SERPL-CCNC: 76 U/L (ref 38–126)
ALT SERPL W P-5'-P-CCNC: 29 U/L (ref 21–72)
ANION GAP SERPL CALCULATED.3IONS-SCNC: 11 MMOL/L (ref 5–15)
AST SERPL-CCNC: 31 U/L (ref 17–59)
BASOPHILS # BLD AUTO: 0.02 10*3/MM3 (ref 0–0.2)
BASOPHILS NFR BLD AUTO: 0.2 % (ref 0–2)
BILIRUB SERPL-MCNC: 0.5 MG/DL (ref 0.2–1.3)
BUN BLD-MCNC: 12 MG/DL (ref 7–21)
BUN/CREAT SERPL: 15.6 (ref 7–25)
CALCIUM SPEC-SCNC: 8.7 MG/DL (ref 8.4–10.2)
CHLORIDE SERPL-SCNC: 96 MMOL/L (ref 95–110)
CK SERPL-CCNC: 200 U/L (ref 55–170)
CO2 SERPL-SCNC: 27 MMOL/L (ref 22–31)
CREAT BLD-MCNC: 0.77 MG/DL (ref 0.7–1.3)
DEPRECATED RDW RBC AUTO: 41.6 FL (ref 35.1–43.9)
EOSINOPHIL # BLD AUTO: 0.29 10*3/MM3 (ref 0–0.7)
EOSINOPHIL NFR BLD AUTO: 3 % (ref 0–7)
ERYTHROCYTE [DISTWIDTH] IN BLOOD BY AUTOMATED COUNT: 14.1 % (ref 11.5–14.5)
GFR SERPL CREATININE-BSD FRML MDRD: 113 ML/MIN/1.73 (ref 70–162)
GLOBULIN UR ELPH-MCNC: 3.8 GM/DL (ref 2.3–3.5)
GLUCOSE BLD-MCNC: 119 MG/DL (ref 60–100)
HCT VFR BLD AUTO: 36.4 % (ref 39–49)
HGB BLD-MCNC: 12.5 G/DL (ref 13.7–17.3)
HOLD SPECIMEN: NORMAL
HOLD SPECIMEN: NORMAL
IMM GRANULOCYTES # BLD: 0.13 10*3/MM3 (ref 0–0.02)
IMM GRANULOCYTES NFR BLD: 1.3 % (ref 0–0.5)
INR PPP: 1.01 (ref 0.8–1.2)
LYMPHOCYTES # BLD AUTO: 1.44 10*3/MM3 (ref 0.6–4.2)
LYMPHOCYTES NFR BLD AUTO: 14.7 % (ref 10–50)
MCH RBC QN AUTO: 27.9 PG (ref 26.5–34)
MCHC RBC AUTO-ENTMCNC: 34.3 G/DL (ref 31.5–36.3)
MCV RBC AUTO: 81.3 FL (ref 80–98)
MONOCYTES # BLD AUTO: 0.73 10*3/MM3 (ref 0–0.9)
MONOCYTES NFR BLD AUTO: 7.5 % (ref 0–12)
NEUTROPHILS # BLD AUTO: 7.16 10*3/MM3 (ref 2–8.6)
NEUTROPHILS NFR BLD AUTO: 73.3 % (ref 37–80)
NT-PROBNP SERPL-MCNC: 29.7 PG/ML (ref 0–450)
PLATELET # BLD AUTO: 206 10*3/MM3 (ref 150–450)
PMV BLD AUTO: 8.6 FL (ref 8–12)
POTASSIUM BLD-SCNC: 3.7 MMOL/L (ref 3.5–5.1)
PROT SERPL-MCNC: 7.8 G/DL (ref 6.3–8.6)
PROTHROMBIN TIME: 13.2 SECONDS (ref 11.1–15.3)
RBC # BLD AUTO: 4.48 10*6/MM3 (ref 4.37–5.74)
SODIUM BLD-SCNC: 134 MMOL/L (ref 137–145)
TROPONIN I SERPL-MCNC: <0.012 NG/ML
TROPONIN I SERPL-MCNC: <0.012 NG/ML
WBC NRBC COR # BLD: 9.77 10*3/MM3 (ref 3.2–9.8)
WHOLE BLOOD HOLD SPECIMEN: NORMAL
WHOLE BLOOD HOLD SPECIMEN: NORMAL

## 2017-05-09 PROCEDURE — 85025 COMPLETE CBC W/AUTO DIFF WBC: CPT | Performed by: NURSE PRACTITIONER

## 2017-05-09 PROCEDURE — G0378 HOSPITAL OBSERVATION PER HR: HCPCS

## 2017-05-09 PROCEDURE — 80053 COMPREHEN METABOLIC PANEL: CPT | Performed by: NURSE PRACTITIONER

## 2017-05-09 PROCEDURE — 93005 ELECTROCARDIOGRAM TRACING: CPT

## 2017-05-09 PROCEDURE — 83880 ASSAY OF NATRIURETIC PEPTIDE: CPT | Performed by: NURSE PRACTITIONER

## 2017-05-09 PROCEDURE — 96374 THER/PROPH/DIAG INJ IV PUSH: CPT

## 2017-05-09 PROCEDURE — 71020 HC CHEST PA AND LATERAL: CPT

## 2017-05-09 PROCEDURE — 85610 PROTHROMBIN TIME: CPT | Performed by: NURSE PRACTITIONER

## 2017-05-09 PROCEDURE — 82550 ASSAY OF CK (CPK): CPT | Performed by: INTERNAL MEDICINE

## 2017-05-09 PROCEDURE — 25010000002 MORPHINE PER 10 MG: Performed by: NURSE PRACTITIONER

## 2017-05-09 PROCEDURE — 84484 ASSAY OF TROPONIN QUANT: CPT | Performed by: NURSE PRACTITIONER

## 2017-05-09 PROCEDURE — 99284 EMERGENCY DEPT VISIT MOD MDM: CPT

## 2017-05-09 RX ORDER — PRAMIPEXOLE DIHYDROCHLORIDE 1 MG/1
2 TABLET ORAL NIGHTLY
Status: DISCONTINUED | OUTPATIENT
Start: 2017-05-09 | End: 2017-05-10 | Stop reason: HOSPADM

## 2017-05-09 RX ORDER — TRAZODONE HYDROCHLORIDE 150 MG/1
300 TABLET ORAL NIGHTLY
Status: DISCONTINUED | OUTPATIENT
Start: 2017-05-09 | End: 2017-05-10 | Stop reason: HOSPADM

## 2017-05-09 RX ORDER — LISINOPRIL 40 MG/1
40 TABLET ORAL EVERY MORNING
Status: DISCONTINUED | OUTPATIENT
Start: 2017-05-10 | End: 2017-05-10 | Stop reason: HOSPADM

## 2017-05-09 RX ORDER — MORPHINE SULFATE 4 MG/ML
4 INJECTION, SOLUTION INTRAMUSCULAR; INTRAVENOUS ONCE
Status: COMPLETED | OUTPATIENT
Start: 2017-05-09 | End: 2017-05-09

## 2017-05-09 RX ORDER — SODIUM CHLORIDE 0.9 % (FLUSH) 0.9 %
10 SYRINGE (ML) INJECTION AS NEEDED
Status: DISCONTINUED | OUTPATIENT
Start: 2017-05-09 | End: 2017-05-10 | Stop reason: HOSPADM

## 2017-05-09 RX ORDER — HYDRALAZINE HYDROCHLORIDE 20 MG/ML
10 INJECTION INTRAMUSCULAR; INTRAVENOUS EVERY 6 HOURS PRN
Status: DISCONTINUED | OUTPATIENT
Start: 2017-05-09 | End: 2017-05-10 | Stop reason: HOSPADM

## 2017-05-09 RX ORDER — PANTOPRAZOLE SODIUM 40 MG/1
40 TABLET, DELAYED RELEASE ORAL NIGHTLY
Status: DISCONTINUED | OUTPATIENT
Start: 2017-05-09 | End: 2017-05-10 | Stop reason: HOSPADM

## 2017-05-09 RX ORDER — IPRATROPIUM BROMIDE AND ALBUTEROL SULFATE 2.5; .5 MG/3ML; MG/3ML
3 SOLUTION RESPIRATORY (INHALATION) EVERY 4 HOURS PRN
Status: DISCONTINUED | OUTPATIENT
Start: 2017-05-09 | End: 2017-05-10 | Stop reason: HOSPADM

## 2017-05-09 RX ORDER — CLOPIDOGREL BISULFATE 75 MG/1
75 TABLET ORAL DAILY
Status: DISCONTINUED | OUTPATIENT
Start: 2017-05-10 | End: 2017-05-10 | Stop reason: HOSPADM

## 2017-05-09 RX ORDER — ACETAMINOPHEN 325 MG/1
650 TABLET ORAL EVERY 4 HOURS PRN
Status: DISCONTINUED | OUTPATIENT
Start: 2017-05-09 | End: 2017-05-10 | Stop reason: HOSPADM

## 2017-05-09 RX ORDER — LORAZEPAM 1 MG/1
1 TABLET ORAL 2 TIMES DAILY PRN
Status: DISCONTINUED | OUTPATIENT
Start: 2017-05-09 | End: 2017-05-10 | Stop reason: HOSPADM

## 2017-05-09 RX ORDER — DOCUSATE SODIUM 100 MG/1
200 CAPSULE, LIQUID FILLED ORAL 2 TIMES DAILY
Status: DISCONTINUED | OUTPATIENT
Start: 2017-05-10 | End: 2017-05-10 | Stop reason: HOSPADM

## 2017-05-09 RX ORDER — NICOTINE POLACRILEX 4 MG
15 LOZENGE BUCCAL
Status: DISCONTINUED | OUTPATIENT
Start: 2017-05-09 | End: 2017-05-10 | Stop reason: HOSPADM

## 2017-05-09 RX ORDER — NITROGLYCERIN 0.4 MG/1
0.4 TABLET SUBLINGUAL
Status: DISCONTINUED | OUTPATIENT
Start: 2017-05-09 | End: 2017-05-09 | Stop reason: SDUPTHER

## 2017-05-09 RX ORDER — RANOLAZINE 500 MG/1
1000 TABLET, EXTENDED RELEASE ORAL EVERY 12 HOURS SCHEDULED
Status: DISCONTINUED | OUTPATIENT
Start: 2017-05-09 | End: 2017-05-10 | Stop reason: HOSPADM

## 2017-05-09 RX ORDER — HYDROCODONE BITARTRATE AND ACETAMINOPHEN 10; 325 MG/1; MG/1
1 TABLET ORAL EVERY 6 HOURS PRN
Status: DISCONTINUED | OUTPATIENT
Start: 2017-05-09 | End: 2017-05-10 | Stop reason: HOSPADM

## 2017-05-09 RX ORDER — BISACODYL 10 MG
10 SUPPOSITORY, RECTAL RECTAL DAILY PRN
Status: DISCONTINUED | OUTPATIENT
Start: 2017-05-09 | End: 2017-05-10 | Stop reason: HOSPADM

## 2017-05-09 RX ORDER — NITROGLYCERIN 0.4 MG/1
0.4 TABLET SUBLINGUAL
Status: DISCONTINUED | OUTPATIENT
Start: 2017-05-09 | End: 2017-05-10 | Stop reason: HOSPADM

## 2017-05-09 RX ORDER — ONDANSETRON 4 MG/1
4 TABLET, ORALLY DISINTEGRATING ORAL EVERY 6 HOURS PRN
Status: DISCONTINUED | OUTPATIENT
Start: 2017-05-09 | End: 2017-05-10 | Stop reason: HOSPADM

## 2017-05-09 RX ORDER — SUCRALFATE 1 G/1
1 TABLET ORAL 4 TIMES DAILY
Status: DISCONTINUED | OUTPATIENT
Start: 2017-05-09 | End: 2017-05-10 | Stop reason: HOSPADM

## 2017-05-09 RX ORDER — ASPIRIN 81 MG/1
324 TABLET, CHEWABLE ORAL ONCE
Status: COMPLETED | OUTPATIENT
Start: 2017-05-09 | End: 2017-05-09

## 2017-05-09 RX ORDER — SODIUM CHLORIDE 0.9 % (FLUSH) 0.9 %
1-10 SYRINGE (ML) INJECTION AS NEEDED
Status: DISCONTINUED | OUTPATIENT
Start: 2017-05-09 | End: 2017-05-10 | Stop reason: HOSPADM

## 2017-05-09 RX ORDER — ASPIRIN 325 MG
325 TABLET ORAL ONCE
Status: DISCONTINUED | OUTPATIENT
Start: 2017-05-09 | End: 2017-05-09

## 2017-05-09 RX ORDER — METOPROLOL SUCCINATE 100 MG/1
100 TABLET, EXTENDED RELEASE ORAL DAILY
Status: DISCONTINUED | OUTPATIENT
Start: 2017-05-10 | End: 2017-05-10 | Stop reason: HOSPADM

## 2017-05-09 RX ORDER — AMLODIPINE BESYLATE 10 MG/1
10 TABLET ORAL NIGHTLY
Status: DISCONTINUED | OUTPATIENT
Start: 2017-05-09 | End: 2017-05-10 | Stop reason: HOSPADM

## 2017-05-09 RX ORDER — ALUMINA, MAGNESIA, AND SIMETHICONE 2400; 2400; 240 MG/30ML; MG/30ML; MG/30ML
30 SUSPENSION ORAL EVERY 6 HOURS PRN
Status: DISCONTINUED | OUTPATIENT
Start: 2017-05-09 | End: 2017-05-10 | Stop reason: HOSPADM

## 2017-05-09 RX ORDER — SODIUM CHLORIDE 9 MG/ML
100 INJECTION, SOLUTION INTRAVENOUS CONTINUOUS
Status: DISCONTINUED | OUTPATIENT
Start: 2017-05-09 | End: 2017-05-10

## 2017-05-09 RX ORDER — ISOSORBIDE MONONITRATE 60 MG/1
120 TABLET, EXTENDED RELEASE ORAL EVERY MORNING
Status: DISCONTINUED | OUTPATIENT
Start: 2017-05-10 | End: 2017-05-10 | Stop reason: HOSPADM

## 2017-05-09 RX ORDER — DEXTROSE MONOHYDRATE 25 G/50ML
25 INJECTION, SOLUTION INTRAVENOUS
Status: DISCONTINUED | OUTPATIENT
Start: 2017-05-09 | End: 2017-05-10 | Stop reason: HOSPADM

## 2017-05-09 RX ORDER — PRAVASTATIN SODIUM 40 MG
80 TABLET ORAL NIGHTLY
Status: DISCONTINUED | OUTPATIENT
Start: 2017-05-09 | End: 2017-05-10 | Stop reason: HOSPADM

## 2017-05-09 RX ADMIN — NITROGLYCERIN 0.4 MG: 0.4 TABLET SUBLINGUAL at 19:52

## 2017-05-09 RX ADMIN — MORPHINE SULFATE 4 MG: 4 INJECTION, SOLUTION INTRAMUSCULAR; INTRAVENOUS at 20:52

## 2017-05-09 RX ADMIN — ASPIRIN 324 MG: 81 TABLET, CHEWABLE ORAL at 19:52

## 2017-05-10 VITALS
DIASTOLIC BLOOD PRESSURE: 58 MMHG | TEMPERATURE: 98.8 F | HEIGHT: 71 IN | RESPIRATION RATE: 22 BRPM | SYSTOLIC BLOOD PRESSURE: 124 MMHG | BODY MASS INDEX: 44.1 KG/M2 | WEIGHT: 315 LBS | OXYGEN SATURATION: 94 % | HEART RATE: 77 BPM

## 2017-05-10 LAB
ALBUMIN SERPL-MCNC: 3.7 G/DL (ref 3.4–4.8)
ALBUMIN/GLOB SERPL: 1.1 G/DL (ref 1.1–1.8)
ALP SERPL-CCNC: 74 U/L (ref 38–126)
ALT SERPL W P-5'-P-CCNC: 57 U/L (ref 21–72)
ANION GAP SERPL CALCULATED.3IONS-SCNC: 11 MMOL/L (ref 5–15)
AST SERPL-CCNC: 84 U/L (ref 17–59)
BACTERIA UR QL AUTO: ABNORMAL /HPF
BASOPHILS # BLD AUTO: 0.01 10*3/MM3 (ref 0–0.2)
BASOPHILS NFR BLD AUTO: 0.1 % (ref 0–2)
BILIRUB SERPL-MCNC: 0.6 MG/DL (ref 0.2–1.3)
BILIRUB UR QL STRIP: NEGATIVE
BILIRUB UR QL STRIP: NEGATIVE
BUN BLD-MCNC: 12 MG/DL (ref 7–21)
BUN/CREAT SERPL: 15.8 (ref 7–25)
CALCIUM SPEC-SCNC: 8.4 MG/DL (ref 8.4–10.2)
CHLORIDE SERPL-SCNC: 98 MMOL/L (ref 95–110)
CK MB SERPL-CCNC: 1.26 NG/ML (ref 0–5)
CK SERPL-CCNC: 180 U/L (ref 55–170)
CLARITY UR: CLEAR
CLARITY UR: CLEAR
CO2 SERPL-SCNC: 25 MMOL/L (ref 22–31)
COLOR UR: YELLOW
COLOR UR: YELLOW
CREAT BLD-MCNC: 0.76 MG/DL (ref 0.7–1.3)
DEPRECATED RDW RBC AUTO: 42 FL (ref 35.1–43.9)
EOSINOPHIL # BLD AUTO: 0.24 10*3/MM3 (ref 0–0.7)
EOSINOPHIL NFR BLD AUTO: 3.4 % (ref 0–7)
ERYTHROCYTE [DISTWIDTH] IN BLOOD BY AUTOMATED COUNT: 14.3 % (ref 11.5–14.5)
GFR SERPL CREATININE-BSD FRML MDRD: 115 ML/MIN/1.73 (ref 60–162)
GLOBULIN UR ELPH-MCNC: 3.5 GM/DL (ref 2.3–3.5)
GLUCOSE BLD-MCNC: 167 MG/DL (ref 60–100)
GLUCOSE BLDC GLUCOMTR-MCNC: 173 MG/DL (ref 70–130)
GLUCOSE UR STRIP-MCNC: ABNORMAL MG/DL
GLUCOSE UR STRIP-MCNC: ABNORMAL MG/DL
HCT VFR BLD AUTO: 35.9 % (ref 39–49)
HGB BLD-MCNC: 12.3 G/DL (ref 13.7–17.3)
HGB UR QL STRIP.AUTO: NEGATIVE
HGB UR QL STRIP.AUTO: NEGATIVE
HYALINE CASTS UR QL AUTO: ABNORMAL /LPF
IMM GRANULOCYTES # BLD: 0.11 10*3/MM3 (ref 0–0.02)
IMM GRANULOCYTES NFR BLD: 1.6 % (ref 0–0.5)
KETONES UR QL STRIP: NEGATIVE
KETONES UR QL STRIP: NEGATIVE
LEUKOCYTE ESTERASE UR QL STRIP.AUTO: NEGATIVE
LEUKOCYTE ESTERASE UR QL STRIP.AUTO: NEGATIVE
LYMPHOCYTES # BLD AUTO: 1.32 10*3/MM3 (ref 0.6–4.2)
LYMPHOCYTES NFR BLD AUTO: 19 % (ref 10–50)
MAGNESIUM SERPL-MCNC: 1.8 MG/DL (ref 1.6–2.3)
MCH RBC QN AUTO: 27.6 PG (ref 26.5–34)
MCHC RBC AUTO-ENTMCNC: 34.3 G/DL (ref 31.5–36.3)
MCV RBC AUTO: 80.7 FL (ref 80–98)
MONOCYTES # BLD AUTO: 0.47 10*3/MM3 (ref 0–0.9)
MONOCYTES NFR BLD AUTO: 6.8 % (ref 0–12)
NEUTROPHILS # BLD AUTO: 4.81 10*3/MM3 (ref 2–8.6)
NEUTROPHILS NFR BLD AUTO: 69.1 % (ref 37–80)
NITRITE UR QL STRIP: NEGATIVE
NITRITE UR QL STRIP: NEGATIVE
PH UR STRIP.AUTO: 6 [PH] (ref 5–9)
PH UR STRIP.AUTO: 6 [PH] (ref 5–9)
PLATELET # BLD AUTO: 182 10*3/MM3 (ref 150–450)
PMV BLD AUTO: 8.7 FL (ref 8–12)
POTASSIUM BLD-SCNC: 3.7 MMOL/L (ref 3.5–5.1)
PROT SERPL-MCNC: 7.2 G/DL (ref 6.3–8.6)
PROT UR QL STRIP: ABNORMAL
PROT UR QL STRIP: ABNORMAL
RBC # BLD AUTO: 4.45 10*6/MM3 (ref 4.37–5.74)
RBC # UR: ABNORMAL /HPF
REF LAB TEST METHOD: ABNORMAL
SODIUM BLD-SCNC: 134 MMOL/L (ref 137–145)
SP GR UR STRIP: 1.03 (ref 1–1.03)
SP GR UR STRIP: 1.03 (ref 1–1.03)
SQUAMOUS #/AREA URNS HPF: ABNORMAL /HPF
TROPONIN I SERPL-MCNC: <0.012 NG/ML
UROBILINOGEN UR QL STRIP: ABNORMAL
UROBILINOGEN UR QL STRIP: ABNORMAL
WBC NRBC COR # BLD: 6.96 10*3/MM3 (ref 3.2–9.8)
WBC UR QL AUTO: ABNORMAL /HPF

## 2017-05-10 PROCEDURE — 81001 URINALYSIS AUTO W/SCOPE: CPT | Performed by: INTERNAL MEDICINE

## 2017-05-10 PROCEDURE — 80053 COMPREHEN METABOLIC PANEL: CPT | Performed by: INTERNAL MEDICINE

## 2017-05-10 PROCEDURE — 82962 GLUCOSE BLOOD TEST: CPT

## 2017-05-10 PROCEDURE — 82553 CREATINE MB FRACTION: CPT | Performed by: INTERNAL MEDICINE

## 2017-05-10 PROCEDURE — G0378 HOSPITAL OBSERVATION PER HR: HCPCS

## 2017-05-10 PROCEDURE — 83735 ASSAY OF MAGNESIUM: CPT | Performed by: INTERNAL MEDICINE

## 2017-05-10 PROCEDURE — 85025 COMPLETE CBC W/AUTO DIFF WBC: CPT | Performed by: INTERNAL MEDICINE

## 2017-05-10 PROCEDURE — 82550 ASSAY OF CK (CPK): CPT | Performed by: INTERNAL MEDICINE

## 2017-05-10 PROCEDURE — 93005 ELECTROCARDIOGRAM TRACING: CPT | Performed by: INTERNAL MEDICINE

## 2017-05-10 PROCEDURE — 84484 ASSAY OF TROPONIN QUANT: CPT | Performed by: INTERNAL MEDICINE

## 2017-05-10 PROCEDURE — 81003 URINALYSIS AUTO W/O SCOPE: CPT | Performed by: INTERNAL MEDICINE

## 2017-05-10 RX ORDER — ALBUTEROL SULFATE 2.5 MG/3ML
SOLUTION RESPIRATORY (INHALATION) AS NEEDED
Status: DISCONTINUED | OUTPATIENT
Start: 2017-05-10 | End: 2017-05-10 | Stop reason: HOSPADM

## 2017-05-10 RX ADMIN — ISOSORBIDE MONONITRATE 120 MG: 60 TABLET, EXTENDED RELEASE ORAL at 06:45

## 2017-05-10 RX ADMIN — RANOLAZINE 1000 MG: 500 TABLET, FILM COATED, EXTENDED RELEASE ORAL at 01:11

## 2017-05-10 RX ADMIN — TRAZODONE HYDROCHLORIDE 300 MG: 150 TABLET ORAL at 01:11

## 2017-05-10 RX ADMIN — RANOLAZINE 1000 MG: 500 TABLET, FILM COATED, EXTENDED RELEASE ORAL at 08:17

## 2017-05-10 RX ADMIN — PRAVASTATIN SODIUM 80 MG: 40 TABLET ORAL at 01:12

## 2017-05-10 RX ADMIN — Medication 10 ML: at 08:19

## 2017-05-10 RX ADMIN — METFORMIN HYDROCHLORIDE 1000 MG: 500 TABLET ORAL at 01:14

## 2017-05-10 RX ADMIN — PANTOPRAZOLE SODIUM 40 MG: 40 TABLET, DELAYED RELEASE ORAL at 01:13

## 2017-05-10 RX ADMIN — RIVAROXABAN 20 MG: 10 TABLET, FILM COATED ORAL at 01:11

## 2017-05-10 RX ADMIN — SUCRALFATE 1 G: 1 TABLET ORAL at 08:17

## 2017-05-10 RX ADMIN — LISINOPRIL 40 MG: 40 TABLET ORAL at 06:45

## 2017-05-10 RX ADMIN — SODIUM CHLORIDE 100 ML/HR: 9 INJECTION, SOLUTION INTRAVENOUS at 01:28

## 2017-05-10 RX ADMIN — LORAZEPAM 1 MG: 1 TABLET ORAL at 01:28

## 2017-05-10 RX ADMIN — METOPROLOL SUCCINATE 100 MG: 100 TABLET, EXTENDED RELEASE ORAL at 08:17

## 2017-05-10 RX ADMIN — CLOPIDOGREL BISULFATE 75 MG: 75 TABLET ORAL at 08:17

## 2017-05-10 RX ADMIN — HYDROCODONE BITARTRATE AND ACETAMINOPHEN 1 TABLET: 10; 325 TABLET ORAL at 01:11

## 2017-05-10 RX ADMIN — METFORMIN HYDROCHLORIDE 1000 MG: 500 TABLET ORAL at 08:17

## 2017-05-10 RX ADMIN — PRAMIPEXOLE DIHYDROCHLORIDE 2 MG: 1 TABLET ORAL at 01:12

## 2017-05-10 RX ADMIN — AMLODIPINE BESYLATE 10 MG: 10 TABLET ORAL at 01:13

## 2017-05-10 RX ADMIN — SUCRALFATE 1 G: 1 TABLET ORAL at 01:13

## 2017-05-18 ENCOUNTER — OFFICE VISIT (OUTPATIENT)
Dept: CARDIOLOGY | Facility: CLINIC | Age: 37
End: 2017-05-18

## 2017-05-18 VITALS
HEIGHT: 71 IN | BODY MASS INDEX: 44.1 KG/M2 | DIASTOLIC BLOOD PRESSURE: 58 MMHG | WEIGHT: 315 LBS | SYSTOLIC BLOOD PRESSURE: 110 MMHG | HEART RATE: 87 BPM | OXYGEN SATURATION: 96 %

## 2017-05-18 DIAGNOSIS — I25.118 CORONARY ARTERY DISEASE INVOLVING NATIVE HEART WITH OTHER FORM OF ANGINA PECTORIS: Primary | ICD-10-CM

## 2017-05-18 DIAGNOSIS — I10 ESSENTIAL HYPERTENSION: ICD-10-CM

## 2017-05-18 DIAGNOSIS — E66.01 MORBID OBESITY DUE TO EXCESS CALORIES (HCC): ICD-10-CM

## 2017-05-18 DIAGNOSIS — I26.99 PULMONARY EMBOLISM ON LONG-TERM ANTICOAGULATION THERAPY (HCC): ICD-10-CM

## 2017-05-18 DIAGNOSIS — E78.5 HYPERLIPIDEMIA, UNSPECIFIED HYPERLIPIDEMIA TYPE: ICD-10-CM

## 2017-05-18 DIAGNOSIS — Z79.01 PULMONARY EMBOLISM ON LONG-TERM ANTICOAGULATION THERAPY (HCC): ICD-10-CM

## 2017-05-18 PROCEDURE — 99213 OFFICE O/P EST LOW 20 MIN: CPT | Performed by: INTERNAL MEDICINE

## 2017-05-23 PROBLEM — I25.10 CORONARY ARTERY DISEASE INVOLVING NATIVE HEART: Status: ACTIVE | Noted: 2017-05-23

## 2017-05-23 PROBLEM — I26.99 PULMONARY EMBOLISM ON LONG-TERM ANTICOAGULATION THERAPY: Status: ACTIVE | Noted: 2017-05-23

## 2017-05-23 PROBLEM — Z79.01 PULMONARY EMBOLISM ON LONG-TERM ANTICOAGULATION THERAPY (HCC): Status: ACTIVE | Noted: 2017-05-23

## 2017-05-23 PROBLEM — I10 ESSENTIAL HYPERTENSION: Status: ACTIVE | Noted: 2017-05-23

## 2017-05-30 ENCOUNTER — OFFICE VISIT (OUTPATIENT)
Dept: CARDIAC SURGERY | Facility: CLINIC | Age: 37
End: 2017-05-30

## 2017-05-30 VITALS
WEIGHT: 315 LBS | SYSTOLIC BLOOD PRESSURE: 159 MMHG | TEMPERATURE: 97.3 F | OXYGEN SATURATION: 98 % | HEIGHT: 71 IN | BODY MASS INDEX: 44.1 KG/M2 | DIASTOLIC BLOOD PRESSURE: 79 MMHG | HEART RATE: 81 BPM

## 2017-05-30 DIAGNOSIS — Z79.01 PULMONARY EMBOLISM ON LONG-TERM ANTICOAGULATION THERAPY (HCC): Primary | ICD-10-CM

## 2017-05-30 DIAGNOSIS — E11.59 TYPE 2 DIABETES MELLITUS WITH OTHER CIRCULATORY COMPLICATION: Chronic | ICD-10-CM

## 2017-05-30 DIAGNOSIS — I26.99 PULMONARY EMBOLISM ON LONG-TERM ANTICOAGULATION THERAPY (HCC): Primary | ICD-10-CM

## 2017-05-30 DIAGNOSIS — D68.2 FACTOR II DEFICIENCY (HCC): ICD-10-CM

## 2017-05-30 PROCEDURE — 99213 OFFICE O/P EST LOW 20 MIN: CPT | Performed by: NURSE PRACTITIONER

## 2017-06-01 ENCOUNTER — OFFICE VISIT (OUTPATIENT)
Dept: FAMILY MEDICINE CLINIC | Facility: CLINIC | Age: 37
End: 2017-06-01

## 2017-06-01 VITALS
HEIGHT: 71 IN | BODY MASS INDEX: 44.1 KG/M2 | HEART RATE: 69 BPM | RESPIRATION RATE: 20 BRPM | SYSTOLIC BLOOD PRESSURE: 122 MMHG | DIASTOLIC BLOOD PRESSURE: 86 MMHG | TEMPERATURE: 97.7 F | WEIGHT: 315 LBS | OXYGEN SATURATION: 95 %

## 2017-06-01 DIAGNOSIS — L08.9 STAPH SKIN INFECTION: ICD-10-CM

## 2017-06-01 DIAGNOSIS — F41.9 ANXIETY: ICD-10-CM

## 2017-06-01 DIAGNOSIS — E66.01 MORBID OBESITY DUE TO EXCESS CALORIES (HCC): Chronic | ICD-10-CM

## 2017-06-01 DIAGNOSIS — F32.A DEPRESSION, UNSPECIFIED DEPRESSION TYPE: ICD-10-CM

## 2017-06-01 DIAGNOSIS — Z00.121 ENCOUNTER FOR ROUTINE CHILD HEALTH EXAMINATION WITH ABNORMAL FINDINGS: ICD-10-CM

## 2017-06-01 DIAGNOSIS — I25.118 CORONARY ARTERY DISEASE INVOLVING NATIVE HEART WITH OTHER FORM OF ANGINA PECTORIS: ICD-10-CM

## 2017-06-01 DIAGNOSIS — K21.9 GASTROESOPHAGEAL REFLUX DISEASE WITHOUT ESOPHAGITIS: ICD-10-CM

## 2017-06-01 DIAGNOSIS — B95.8 STAPH SKIN INFECTION: ICD-10-CM

## 2017-06-01 DIAGNOSIS — E11.8 TYPE 2 DIABETES MELLITUS WITH COMPLICATION, WITHOUT LONG-TERM CURRENT USE OF INSULIN (HCC): Primary | ICD-10-CM

## 2017-06-01 DIAGNOSIS — D68.2 FACTOR II DEFICIENCY (HCC): ICD-10-CM

## 2017-06-01 DIAGNOSIS — I10 ESSENTIAL HYPERTENSION: ICD-10-CM

## 2017-06-01 DIAGNOSIS — E78.5 HYPERLIPIDEMIA, UNSPECIFIED HYPERLIPIDEMIA TYPE: Chronic | ICD-10-CM

## 2017-06-01 DIAGNOSIS — I26.99 OTHER ACUTE PULMONARY EMBOLISM WITHOUT ACUTE COR PULMONALE (HCC): Chronic | ICD-10-CM

## 2017-06-01 PROCEDURE — 99214 OFFICE O/P EST MOD 30 MIN: CPT | Performed by: NURSE PRACTITIONER

## 2017-06-01 RX ORDER — NITROGLYCERIN 0.4 MG/1
0.4 TABLET SUBLINGUAL
Qty: 25 TABLET | Refills: 6 | Status: ON HOLD | OUTPATIENT
Start: 2017-06-01 | End: 2017-10-10

## 2017-06-01 RX ORDER — SERTRALINE HYDROCHLORIDE 25 MG/1
25 TABLET, FILM COATED ORAL DAILY
Qty: 30 TABLET | Refills: 5 | Status: SHIPPED | OUTPATIENT
Start: 2017-06-01 | End: 2017-11-10 | Stop reason: SDUPTHER

## 2017-06-01 RX ORDER — PRAMIPEXOLE DIHYDROCHLORIDE 1 MG/1
2 TABLET ORAL NIGHTLY
Qty: 30 TABLET | Refills: 5 | Status: SHIPPED | OUTPATIENT
Start: 2017-06-01 | End: 2017-11-10 | Stop reason: SDUPTHER

## 2017-06-01 RX ORDER — AMLODIPINE BESYLATE 10 MG/1
10 TABLET ORAL NIGHTLY
Qty: 30 TABLET | Refills: 5 | Status: SHIPPED | OUTPATIENT
Start: 2017-06-01 | End: 2017-11-10 | Stop reason: SDUPTHER

## 2017-06-01 RX ORDER — ISOSORBIDE MONONITRATE 120 MG/1
120 TABLET, EXTENDED RELEASE ORAL EVERY MORNING
Qty: 30 TABLET | Refills: 5 | Status: ON HOLD | OUTPATIENT
Start: 2017-06-01 | End: 2017-10-10

## 2017-06-01 RX ORDER — PANTOPRAZOLE SODIUM 40 MG/1
40 TABLET, DELAYED RELEASE ORAL NIGHTLY
Qty: 30 TABLET | Refills: 5 | Status: SHIPPED | OUTPATIENT
Start: 2017-06-01 | End: 2017-11-10 | Stop reason: SDUPTHER

## 2017-06-01 RX ORDER — CLINDAMYCIN HYDROCHLORIDE 300 MG/1
300 CAPSULE ORAL 3 TIMES DAILY
Qty: 21 CAPSULE | Refills: 0 | Status: SHIPPED | OUTPATIENT
Start: 2017-06-01 | End: 2017-06-08

## 2017-06-01 RX ORDER — TRAZODONE HYDROCHLORIDE 300 MG/1
300 TABLET ORAL NIGHTLY
Qty: 30 TABLET | Refills: 5 | Status: SHIPPED | OUTPATIENT
Start: 2017-06-01 | End: 2017-11-10 | Stop reason: SDUPTHER

## 2017-06-01 RX ORDER — PHENTERMINE HYDROCHLORIDE 30 MG/1
30 CAPSULE ORAL DAILY
Refills: 3 | COMMUNITY
Start: 2017-05-18 | End: 2017-06-04 | Stop reason: HOSPADM

## 2017-06-01 RX ORDER — METOPROLOL SUCCINATE 100 MG/1
100 TABLET, EXTENDED RELEASE ORAL NIGHTLY
Qty: 30 TABLET | Refills: 5 | Status: SHIPPED | OUTPATIENT
Start: 2017-06-01 | End: 2017-11-10 | Stop reason: SDUPTHER

## 2017-06-01 RX ORDER — RANOLAZINE 1000 MG/1
1000 TABLET, EXTENDED RELEASE ORAL EVERY 12 HOURS SCHEDULED
Qty: 60 TABLET | Refills: 5 | Status: SHIPPED | OUTPATIENT
Start: 2017-06-01 | End: 2017-11-10 | Stop reason: SDUPTHER

## 2017-06-01 RX ORDER — LISINOPRIL 40 MG/1
40 TABLET ORAL EVERY MORNING
Qty: 30 TABLET | Refills: 5 | Status: SHIPPED | OUTPATIENT
Start: 2017-06-01 | End: 2017-11-10 | Stop reason: SDUPTHER

## 2017-06-01 RX ORDER — PRAVASTATIN SODIUM 80 MG/1
80 TABLET ORAL NIGHTLY
Qty: 30 TABLET | Refills: 5 | Status: SHIPPED | OUTPATIENT
Start: 2017-06-01 | End: 2017-11-10 | Stop reason: SDUPTHER

## 2017-06-01 NOTE — PROGRESS NOTES
"Chief Complaint   Patient presents with   • Establish Care     establish care for all health issues       Subjective   HPI   Yordy Hansen is a 36 y.o. male presents to the office for to establish care and to discuss multiple complex medical issues. His previous PCP was LALA Read. He has an extensive PMH that includes HTN, recent PE, T2DM, morbid obesity, chronic chest pain, factor 2 def., and hyperlipidemia. He is followed by Dr. Boone and LALA Vásquez- cardiology. He had a cardiac stent placed in July 2016 and another placed in 03/2017. He is also a coumadin/anticoagualtion clinic patient. He was recently hospitalized through [facility] on 05/09/2017 for multiple PE. He is now prescribed lifelong xarelto. He is on multiple medications including lifelong xarelto, multiple T2DM agents, and antihypertensives.    Pertinent social history:  He is .  His child passed away last year. He states his wife is concerned that he is suffering from depression secondary to the loss of their child.     Problems to discuss today:  1) Obesity:  He states Dr. Boone prescribed him phentermine 2 months prior, but he has not started taking it yet. He wants to discuss the effects of this medication and wants to know my medical opinion of if he should take it.     2) Anxiety:  He was given ativan by the hospitalist to take PRN for anxiety. He states he feels anxious everyday and gets himself \"worked up\", which in turn worsens his already labored breathing. Associated signs includeHe would like a refill of this medication. He states he has been taking it daily and \"it works good\".   Of note: he was previously evaluated and treated at [facility].    3) BUE \"sores\":  He c/o bilateral arm \"sores\" that he can express purulent drainage from. He states each place is mildly painful. He has had these \"places\" for approximately 1 month.       HPI    Family History   Problem Relation Age of Onset   • Cancer Other  "   • Coronary artery disease Other    • Diabetes Other    • Heart disease Other    • Hypertension Other      Social History     Social History   • Marital status:      Spouse name: Cori   • Number of children: 0   • Years of education: N/A     Occupational History   • Not on file.     Social History Main Topics   • Smoking status: Former Smoker   • Smokeless tobacco: Current User     Types: Snuff   • Alcohol use No   • Drug use: No   • Sexual activity: Defer     Other Topics Concern   • Not on file     Social History Narrative   • No narrative on file       Review of Systems   Constitutional: Negative.  Negative for activity change, appetite change, chills, fatigue, fever and unexpected weight change.   HENT: Negative.  Negative for congestion, drooling, facial swelling, postnasal drip, rhinorrhea, sinus pressure, sore throat, trouble swallowing and voice change.    Eyes: Negative.  Negative for photophobia, pain, discharge and visual disturbance.   Respiratory: Positive for shortness of breath and wheezing. Negative for apnea, cough and choking.    Cardiovascular: Negative.  Negative for chest pain, palpitations and leg swelling.   Gastrointestinal: Negative.  Negative for abdominal distention, abdominal pain, constipation, diarrhea, nausea and vomiting.   Endocrine: Negative.  Negative for polyphagia and polyuria.   Genitourinary: Negative.  Negative for decreased urine volume, difficulty urinating and dysuria.   Musculoskeletal: Negative.  Negative for arthralgias, gait problem and myalgias.   Skin: Positive for wound (BUE folliculitis like wounds). Negative for rash.   Allergic/Immunologic: Negative.    Neurological: Negative.  Negative for dizziness, syncope, weakness, light-headedness, numbness and headaches.   Hematological: Negative.    Psychiatric/Behavioral: Positive for decreased concentration. Negative for confusion and sleep disturbance. The patient is nervous/anxious.      14 Point ROS  "completed with pertinent positives discussed. All other systems reviewed and are negative.     The following portions of the patient's history were reviewed and updated as appropriate: allergies, current medications, past family history, past medical history, past social history, past surgical history and problem list.    Encounter Vitals  Vitals:    06/01/17 0852   BP: 122/86   Pulse: 69   Resp: 20   Temp: 97.7 °F (36.5 °C)   TempSrc: Tympanic   SpO2: 95%   Weight: (!) 465 lb (211 kg)   Height: 71\" (180.3 cm)   PainSc: 0-No pain       Objective:  Physical Exam   Constitutional: He is oriented to person, place, and time. Vital signs are normal. He appears well-developed and well-nourished.  Non-toxic appearance.   Morbid obesity   HENT:   Head: Normocephalic and atraumatic.   Right Ear: Tympanic membrane, external ear and ear canal normal.   Left Ear: Tympanic membrane, external ear and ear canal normal.   Nose: Nose normal.   Mouth/Throat: Uvula is midline, oropharynx is clear and moist and mucous membranes are normal. No posterior oropharyngeal edema or posterior oropharyngeal erythema.   Eyes: Lids are normal. Pupils are equal, round, and reactive to light.   Neck: Trachea normal and normal range of motion. Neck supple. No thyroid mass present.   Cardiovascular: Normal rate, regular rhythm, S1 normal, S2 normal, normal heart sounds and intact distal pulses.  Exam reveals no gallop and no friction rub.    No murmur heard.  Pulmonary/Chest: Effort normal and breath sounds normal. No respiratory distress. He has no wheezes. He has no rales.   Labored breathing with speaking   Abdominal: Soft. Normal appearance and bowel sounds are normal. He exhibits no mass. There is no rebound and no guarding.   Musculoskeletal: Normal range of motion.   Lymphadenopathy:     He has no cervical adenopathy.   Neurological: He is alert and oriented to person, place, and time. He has normal strength. No cranial nerve deficit or " sensory deficit. Gait normal.   Skin: Skin is warm and dry. Rash noted. Rash is pustular.   Multiple erythematous lesions with purulent drainage consistent with folliculitis to BUE forearms   Psychiatric: His speech is normal and behavior is normal. Judgment and thought content normal. His mood appears anxious. Cognition and memory are normal.   Nursing note and vitals reviewed.      Pertinent Labs  See below    Key Imaging/Tracings/POC Testing    Assessment and Medications  Yordy was seen today for establish care.    Diagnoses and all orders for this visit:    Type 2 diabetes mellitus with complication, without long-term current use of insulin  -     Ambulatory Referral to Ophthalmology  -     Microalbumin / Creatinine Urine Ratio    Anxiety  -     Ambulatory Referral to Psychiatry    Depression, unspecified depression type  -     Ambulatory Referral to Psychiatry    Encounter for routine child health examination with abnormal findings  -     CBC & Differential  -     Comprehensive Metabolic Panel  -     Hemoglobin A1c  -     Lipid Panel  -     TSH  -     T4, Free  -     Vitamin B12  -     Microalbumin / Creatinine Urine Ratio    Staph skin infection    Factor II deficiency    Gastroesophageal reflux disease without esophagitis    Morbid obesity due to excess calories    Hyperlipidemia, unspecified hyperlipidemia type    Other acute pulmonary embolism without acute cor pulmonale    Essential hypertension    Coronary artery disease involving native heart with other form of angina pectoris    Other orders  -     isosorbide mononitrate (IMDUR) 120 MG 24 hr tablet; Take 1 tablet by mouth Every Morning.  -     nitroglycerin (NITROSTAT) 0.4 MG SL tablet; Place 1 tablet under the tongue Every 5 (Five) Minutes As Needed for Chest Pain.  -     ranolazine (RANEXA) 1000 MG 12 hr tablet; Take 1 tablet by mouth Every 12 (Twelve) Hours.  -     traZODone (DESYREL) 300 MG tablet; Take 1 tablet by mouth Every Night.  -      Canagliflozin (INVOKANA) 100 MG tablet; Take 1 tablet by mouth Every Night.  -     metFORMIN (GLUCOPHAGE) 1000 MG tablet; Take 1 tablet by mouth 2 (Two) Times a Day With Meals.  -     pravastatin (PRAVACHOL) 80 MG tablet; Take 1 tablet by mouth Every Night.  -     lisinopril (PRINIVIL,ZESTRIL) 40 MG tablet; Take 1 tablet by mouth Every Morning.  -     pramipexole (MIRAPEX) 1 MG tablet; Take 2 tablets by mouth Every Night. Taking 2mg daily  -     metoprolol succinate XL (TOPROL-XL) 100 MG 24 hr tablet; Take 1 tablet by mouth Every Night.  -     amLODIPine (NORVASC) 10 MG tablet; Take 1 tablet by mouth Every Night.  -     pantoprazole (PROTONIX) 40 MG EC tablet; Take 1 tablet by mouth Every Night.  -     sertraline (ZOLOFT) 25 MG tablet; Take 1 tablet by mouth Daily.  -     clindamycin (CLEOCIN) 300 MG capsule; Take 1 capsule by mouth 3 (Three) Times a Day for 7 days.      Side effects of ordered medications discussed with patient.     Plan/Additional Notes/Instructions  Plan   1. Medical records requested from previous PCP and mental health, and will be independently reviewed  2. Start zoloft today  3. Start clinda today for suspected MRSA infection to BUE  4. Refer to ophthalmology for diabetic eye exam  5. labwork ordered today to be completed before next f/u appt in 1 month  6. Refer to psych for depression/anxiety evaluation and additional management  7. F/u in 1 month after completion of above ordered studies and referrals  8. Will complete diabetic foot exam and medicare wellness exam at next visit  9. Will refer to gastro and bariatric work-up with dietary consultation at next office visit given his BMI is 64.9      Follow-Up  Return in about 4 weeks (around 6/29/2017), or if symptoms worsen or fail to improve.    Patient/caregiver verbalizes understanding of all orders and instructions in this plan of care.       PHQ-9 Depression Screening 6/1/2017   Little interest or pleasure in doing things 1   Feeling  down, depressed, or hopeless 1   Trouble falling or staying asleep, or sleeping too much 3   Feeling tired or having little energy 3   Poor appetite or overeating 1   Feeling bad about yourself - or that you are a failure or have let yourself or your family down 0   Trouble concentrating on things, such as reading the newspaper or watching television 1   Moving or speaking so slowly that other people could have noticed. Or the opposite - being so fidgety or restless that you have been moving around a lot more than usual 3   Thoughts that you would be better off dead, or of hurting yourself in some way 0   PHQ-9 Total Score 13   If you checked off any problems, how difficult have these problems made it for you to do your work, take care of things at home, or get along with other people? Somewhat difficult     PHQ-9 Total Score: 13         Admission on 05/09/2017, Discharged on 05/10/2017   Component Date Value Ref Range Status   • Troponin I 05/09/2017 <0.012  <=0.034 ng/mL Final   • Troponin I 05/09/2017 <0.012  <=0.034 ng/mL Final   • Glucose 05/09/2017 119* 60 - 100 mg/dL Final   • BUN 05/09/2017 12  7 - 21 mg/dL Final   • Creatinine 05/09/2017 0.77  0.70 - 1.30 mg/dL Final   • Sodium 05/09/2017 134* 137 - 145 mmol/L Final   • Potassium 05/09/2017 3.7  3.5 - 5.1 mmol/L Final   • Chloride 05/09/2017 96  95 - 110 mmol/L Final   • CO2 05/09/2017 27.0  22.0 - 31.0 mmol/L Final   • Calcium 05/09/2017 8.7  8.4 - 10.2 mg/dL Final   • Total Protein 05/09/2017 7.8  6.3 - 8.6 g/dL Final   • Albumin 05/09/2017 4.00  3.40 - 4.80 g/dL Final   • ALT (SGPT) 05/09/2017 29  21 - 72 U/L Final   • AST (SGOT) 05/09/2017 31  17 - 59 U/L Final   • Alkaline Phosphatase 05/09/2017 76  38 - 126 U/L Final   • Total Bilirubin 05/09/2017 0.5  0.2 - 1.3 mg/dL Final   • eGFR Non African Amer 05/09/2017 113  70 - 162 mL/min/1.73 Final   • Globulin 05/09/2017 3.8* 2.3 - 3.5 gm/dL Final   • A/G Ratio 05/09/2017 1.1  1.1 - 1.8 g/dL Final   •  BUN/Creatinine Ratio 05/09/2017 15.6  7.0 - 25.0 Final   • Anion Gap 05/09/2017 11.0  5.0 - 15.0 mmol/L Final   • proBNP 05/09/2017 29.7  0.0 - 450.0 pg/mL Final   • Protime 05/09/2017 13.2  11.1 - 15.3 Seconds Final   • INR 05/09/2017 1.01  0.80 - 1.20 Final   • Extra Tube 05/09/2017 hold for add-on   Final    Auto resulted   • Extra Tube 05/09/2017 Hold for add-ons.   Final    Auto resulted.   • Extra Tube 05/09/2017 hold for add-on   Final    Auto resulted   • Extra Tube 05/09/2017 Hold for add-ons.   Final    Auto resulted.   • WBC 05/09/2017 9.77  3.20 - 9.80 10*3/mm3 Final   • RBC 05/09/2017 4.48  4.37 - 5.74 10*6/mm3 Final   • Hemoglobin 05/09/2017 12.5* 13.7 - 17.3 g/dL Final   • Hematocrit 05/09/2017 36.4* 39.0 - 49.0 % Final   • MCV 05/09/2017 81.3  80.0 - 98.0 fL Final   • MCH 05/09/2017 27.9  26.5 - 34.0 pg Final   • MCHC 05/09/2017 34.3  31.5 - 36.3 g/dL Final   • RDW 05/09/2017 14.1  11.5 - 14.5 % Final   • RDW-SD 05/09/2017 41.6  35.1 - 43.9 fl Final   • MPV 05/09/2017 8.6  8.0 - 12.0 fL Final   • Platelets 05/09/2017 206  150 - 450 10*3/mm3 Final   • Neutrophil % 05/09/2017 73.3  37.0 - 80.0 % Final   • Lymphocyte % 05/09/2017 14.7  10.0 - 50.0 % Final   • Monocyte % 05/09/2017 7.5  0.0 - 12.0 % Final   • Eosinophil % 05/09/2017 3.0  0.0 - 7.0 % Final   • Basophil % 05/09/2017 0.2  0.0 - 2.0 % Final   • Immature Grans % 05/09/2017 1.3* 0.0 - 0.5 % Final   • Neutrophils, Absolute 05/09/2017 7.16  2.00 - 8.60 10*3/mm3 Final   • Lymphocytes, Absolute 05/09/2017 1.44  0.60 - 4.20 10*3/mm3 Final   • Monocytes, Absolute 05/09/2017 0.73  0.00 - 0.90 10*3/mm3 Final   • Eosinophils, Absolute 05/09/2017 0.29  0.00 - 0.70 10*3/mm3 Final   • Basophils, Absolute 05/09/2017 0.02  0.00 - 0.20 10*3/mm3 Final   • Immature Grans, Absolute 05/09/2017 0.13* 0.00 - 0.02 10*3/mm3 Final   • Troponin I 05/10/2017 <0.012  <=0.034 ng/mL Final   • Creatine Kinase 05/09/2017 200* 55 - 170 U/L Final   • Creatine Kinase  05/10/2017 180* 55 - 170 U/L Final   • CKMB 05/10/2017 1.26  0.00 - 5.00 ng/mL Final   • Color, UA 05/10/2017 Yellow  Yellow, Straw, Dark Yellow, Suni Final   • Appearance, UA 05/10/2017 Clear  Clear Final   • pH, UA 05/10/2017 6.0  5.0 - 9.0 Final   • Specific Gravity, UA 05/10/2017 1.026  1.003 - 1.030 Final   • Glucose, UA 05/10/2017 100 mg/dL (Trace)* Negative Final   • Ketones, UA 05/10/2017 Negative  Negative Final   • Bilirubin, UA 05/10/2017 Negative  Negative Final   • Blood, UA 05/10/2017 Negative  Negative Final   • Protein, UA 05/10/2017 30 mg/dL (1+)* Negative Final   • Leuk Esterase, UA 05/10/2017 Negative  Negative Final   • Nitrite, UA 05/10/2017 Negative  Negative Final   • Urobilinogen, UA 05/10/2017 0.2 E.U./dL  0.2 - 1.0 E.U./dL Final   • Glucose 05/10/2017 167* 60 - 100 mg/dL Final   • BUN 05/10/2017 12  7 - 21 mg/dL Final   • Creatinine 05/10/2017 0.76  0.70 - 1.30 mg/dL Final   • Sodium 05/10/2017 134* 137 - 145 mmol/L Final   • Potassium 05/10/2017 3.7  3.5 - 5.1 mmol/L Final   • Chloride 05/10/2017 98  95 - 110 mmol/L Final   • CO2 05/10/2017 25.0  22.0 - 31.0 mmol/L Final   • Calcium 05/10/2017 8.4  8.4 - 10.2 mg/dL Final   • Total Protein 05/10/2017 7.2  6.3 - 8.6 g/dL Final   • Albumin 05/10/2017 3.70  3.40 - 4.80 g/dL Final   • ALT (SGPT) 05/10/2017 57  21 - 72 U/L Final   • AST (SGOT) 05/10/2017 84* 17 - 59 U/L Final   • Alkaline Phosphatase 05/10/2017 74  38 - 126 U/L Final   • Total Bilirubin 05/10/2017 0.6  0.2 - 1.3 mg/dL Final   • eGFR Non African Amer 05/10/2017 115  >60 mL/min/1.73 Final   • Globulin 05/10/2017 3.5  2.3 - 3.5 gm/dL Final   • A/G Ratio 05/10/2017 1.1  1.1 - 1.8 g/dL Final   • BUN/Creatinine Ratio 05/10/2017 15.8  7.0 - 25.0 Final   • Anion Gap 05/10/2017 11.0  5.0 - 15.0 mmol/L Final   • Magnesium 05/10/2017 1.8  1.6 - 2.3 mg/dL Final   • Color, UA 05/10/2017 Yellow  Yellow, Straw, Dark Yellow, Suni Final   • Appearance, UA 05/10/2017 Clear  Clear Final   • pH,  UA 05/10/2017 6.0  5.0 - 9.0 Final   • Specific Gravity, UA 05/10/2017 1.026  1.003 - 1.030 Final   • Glucose, UA 05/10/2017 100 mg/dL (Trace)* Negative Final   • Ketones, UA 05/10/2017 Negative  Negative Final   • Bilirubin, UA 05/10/2017 Negative  Negative Final   • Blood, UA 05/10/2017 Negative  Negative Final   • Protein, UA 05/10/2017 30 mg/dL (1+)* Negative Final   • Leuk Esterase, UA 05/10/2017 Negative  Negative Final   • Nitrite, UA 05/10/2017 Negative  Negative Final   • Urobilinogen, UA 05/10/2017 0.2 E.U./dL  0.2 - 1.0 E.U./dL Final   • WBC 05/10/2017 6.96  3.20 - 9.80 10*3/mm3 Final   • RBC 05/10/2017 4.45  4.37 - 5.74 10*6/mm3 Final   • Hemoglobin 05/10/2017 12.3* 13.7 - 17.3 g/dL Final   • Hematocrit 05/10/2017 35.9* 39.0 - 49.0 % Final   • MCV 05/10/2017 80.7  80.0 - 98.0 fL Final   • MCH 05/10/2017 27.6  26.5 - 34.0 pg Final   • MCHC 05/10/2017 34.3  31.5 - 36.3 g/dL Final   • RDW 05/10/2017 14.3  11.5 - 14.5 % Final   • RDW-SD 05/10/2017 42.0  35.1 - 43.9 fl Final   • MPV 05/10/2017 8.7  8.0 - 12.0 fL Final   • Platelets 05/10/2017 182  150 - 450 10*3/mm3 Final   • Neutrophil % 05/10/2017 69.1  37.0 - 80.0 % Final   • Lymphocyte % 05/10/2017 19.0  10.0 - 50.0 % Final   • Monocyte % 05/10/2017 6.8  0.0 - 12.0 % Final   • Eosinophil % 05/10/2017 3.4  0.0 - 7.0 % Final   • Basophil % 05/10/2017 0.1  0.0 - 2.0 % Final   • Immature Grans % 05/10/2017 1.6* 0.0 - 0.5 % Final   • Neutrophils, Absolute 05/10/2017 4.81  2.00 - 8.60 10*3/mm3 Final   • Lymphocytes, Absolute 05/10/2017 1.32  0.60 - 4.20 10*3/mm3 Final   • Monocytes, Absolute 05/10/2017 0.47  0.00 - 0.90 10*3/mm3 Final   • Eosinophils, Absolute 05/10/2017 0.24  0.00 - 0.70 10*3/mm3 Final   • Basophils, Absolute 05/10/2017 0.01  0.00 - 0.20 10*3/mm3 Final   • Immature Grans, Absolute 05/10/2017 0.11* 0.00 - 0.02 10*3/mm3 Final   • Glucose 05/10/2017 173* 70 - 130 mg/dL Final    Meter: [id number]: [id number] STEPHON LAINEZ   • RBC, UA  05/10/2017 0-2* None Seen /HPF Final   • WBC, UA 05/10/2017 0-2  None Seen, 0-2, 3-5 /HPF Final   • Bacteria, UA 05/10/2017 None Seen  None Seen /HPF Final   • Squamous Epithelial Cells, UA 05/10/2017 None Seen  None Seen, 0-2 /HPF Final   • Hyaline Casts, UA 05/10/2017 0-2  None Seen /LPF Final   • Methodology 05/10/2017 Automated Microscopy   Final   Admission on 05/03/2017, Discharged on 05/04/2017   Component Date Value Ref Range Status   • Glucose 05/03/2017 121* 60 - 100 mg/dL Final   • BUN 05/03/2017 19  7 - 21 mg/dL Final   • Creatinine 05/03/2017 0.84  0.70 - 1.30 mg/dL Final   • Sodium 05/03/2017 137  137 - 145 mmol/L Final   • Potassium 05/03/2017 4.1  3.5 - 5.1 mmol/L Final   • Chloride 05/03/2017 101  95 - 110 mmol/L Final   • CO2 05/03/2017 27.0  22.0 - 31.0 mmol/L Final   • Calcium 05/03/2017 8.5  8.4 - 10.2 mg/dL Final   • Total Protein 05/03/2017 7.0  6.3 - 8.6 g/dL Final   • Albumin 05/03/2017 3.50  3.40 - 4.80 g/dL Final   • ALT (SGPT) 05/03/2017 25  21 - 72 U/L Final   • AST (SGOT) 05/03/2017 23  17 - 59 U/L Final   • Alkaline Phosphatase 05/03/2017 65  38 - 126 U/L Final   • Total Bilirubin 05/03/2017 0.3  0.2 - 1.3 mg/dL Final   • eGFR Non African Amer 05/03/2017 102  >60 mL/min/1.73 Final   • Globulin 05/03/2017 3.5  2.3 - 3.5 gm/dL Final   • A/G Ratio 05/03/2017 1.0* 1.1 - 1.8 g/dL Final   • BUN/Creatinine Ratio 05/03/2017 22.6  7.0 - 25.0 Final   • Anion Gap 05/03/2017 9.0  5.0 - 15.0 mmol/L Final   • Protime 05/03/2017 12.8  11.1 - 15.3 Seconds Final   • INR 05/03/2017 0.97  0.80 - 1.20 Final   • PTT 05/03/2017 32.6  20.0 - 40.3 seconds Final   • Troponin I 05/03/2017 <0.012  <=0.034 ng/mL Final   • Troponin I 05/03/2017 <0.012  <=0.034 ng/mL Final   • Troponin I 05/03/2017 <0.012  <=0.034 ng/mL Final   • Creatine Kinase 05/03/2017 172* 55 - 170 U/L Final   • CKMB 05/03/2017 1.21  0.00 - 5.00 ng/mL Final   • WBC 05/03/2017 7.53  3.20 - 9.80 10*3/mm3 Final   • RBC 05/03/2017 4.42  4.37 -  5.74 10*6/mm3 Final   • Hemoglobin 05/03/2017 12.2* 13.7 - 17.3 g/dL Final   • Hematocrit 05/03/2017 36.0* 39.0 - 49.0 % Final   • MCV 05/03/2017 81.4  80.0 - 98.0 fL Final   • MCH 05/03/2017 27.6  26.5 - 34.0 pg Final   • MCHC 05/03/2017 33.9  31.5 - 36.3 g/dL Final   • RDW 05/03/2017 14.4  11.5 - 14.5 % Final   • RDW-SD 05/03/2017 42.7  35.1 - 43.9 fl Final   • MPV 05/03/2017 8.8  8.0 - 12.0 fL Final   • Platelets 05/03/2017 210  150 - 450 10*3/mm3 Final   • Neutrophil % 05/03/2017 67.3  37.0 - 80.0 % Final   • Lymphocyte % 05/03/2017 19.0  10.0 - 50.0 % Final   • Monocyte % 05/03/2017 8.0  0.0 - 12.0 % Final   • Eosinophil % 05/03/2017 3.2  0.0 - 7.0 % Final   • Basophil % 05/03/2017 0.1  0.0 - 2.0 % Final   • Immature Grans % 05/03/2017 2.4* 0.0 - 0.5 % Final   • Neutrophils, Absolute 05/03/2017 5.07  2.00 - 8.60 10*3/mm3 Final   • Lymphocytes, Absolute 05/03/2017 1.43  0.60 - 4.20 10*3/mm3 Final   • Monocytes, Absolute 05/03/2017 0.60  0.00 - 0.90 10*3/mm3 Final   • Eosinophils, Absolute 05/03/2017 0.24  0.00 - 0.70 10*3/mm3 Final   • Basophils, Absolute 05/03/2017 0.01  0.00 - 0.20 10*3/mm3 Final   • Immature Grans, Absolute 05/03/2017 0.18* 0.00 - 0.02 10*3/mm3 Final   • proBNP 05/03/2017 120.0  0.0 - 450.0 pg/mL Final   • D-Dimer, Quantitative 05/03/2017 306  0 - 470 ng/mL (FEU) Final   • Site 05/03/2017    Final    Not performed at this site.   • Cory's Test 05/03/2017    Final    Not performed at this site.   • pH, Arterial 05/03/2017 7.401  7.350 - 7.450 pH units Final   • pCO2, Arterial 05/03/2017 43.9  35.0 - 45.0 mm Hg Final   • pO2, Arterial 05/03/2017 103.4  80.0 - 105.0 mm Hg Final   • HCO3, Arterial 05/03/2017 26.6* 22.0 - 26.0 mmol/L Final   • Base Excess, Arterial 05/03/2017 1.5  -2.4 - 2.4 mmol/L Final   • O2 Saturation, Arterial 05/03/2017 97.6  94.0 - 100.0 % Final   • Hemoglobin, Blood Gas 05/03/2017 12.6* 14 - 18 g/dL Final   • Hematocrit, Blood Gas 05/03/2017 37.0  40.0 - 54.0 % Final   •  CO2 Content 05/03/2017 28.0* 23 - 27 Final   • Sodium, Arterial 05/03/2017 136.4* 138 - 146 mmol/L Final   • Potassium, Arterial 05/03/2017 3.97  3.6 - 4.9 mmol/L Final   • Glucose, Arterial 05/03/2017 125  mmol/L Final   • Barometric Pressure for Blood Gas 05/03/2017   mmHg Final    Not performed at this site.   • Modality 05/03/2017    Final    Not performed at this site.   • Ionized Calcium 05/03/2017 4.7  4.5 - 4.9 mg/dL Final   • Extra Tube 05/03/2017 hold for add-on   Final    Auto resulted   • Glucose 05/03/2017 145* 70 - 130 mg/dL Final    RN NotifiedMeter: [id number]: [id number] ELBA PETERSEN   • Glucose 05/03/2017 215* 70 - 130 mg/dL Final    RN NotifiedMeter: [id number]: [id number] ELBA PETERSEN   • Glucose 05/03/2017 174* 70 - 130 mg/dL Final    RN NotifiedMeter: [id number]: [id number] JUVE ROCKWELL   • Glucose 05/04/2017 144* 70 - 130 mg/dL Final    RN NotifiedMeter: [id number]: [id number] JUVE ROCKWELL   • Glucose 05/04/2017 207* 70 - 130 mg/dL Final    RN NotifiedMeter: [id number]: [id number] CAROLYNE GARNER   Admission on 04/30/2017, Discharged on 05/01/2017   Component Date Value Ref Range Status   • Glucose 04/30/2017 176* 60 - 100 mg/dL Final   • BUN 04/30/2017 9  7 - 21 mg/dL Final   • Creatinine 04/30/2017 0.79  0.70 - 1.30 mg/dL Final   • Sodium 04/30/2017 136* 137 - 145 mmol/L Final   • Potassium 04/30/2017 4.0  3.5 - 5.1 mmol/L Final   • Chloride 04/30/2017 97  95 - 110 mmol/L Final   • CO2 04/30/2017 27.0  22.0 - 31.0 mmol/L Final   • Calcium 04/30/2017 8.8  8.4 - 10.2 mg/dL Final   • Total Protein 04/30/2017 7.7  6.3 - 8.6 g/dL Final   • Albumin 04/30/2017 3.90  3.40 - 4.80 g/dL Final   • ALT (SGPT) 04/30/2017 33  21 - 72 U/L Final   • AST (SGOT) 04/30/2017 33  17 - 59 U/L Final   • Alkaline Phosphatase 04/30/2017 74  38 - 126 U/L Final   • Total Bilirubin 04/30/2017 0.6  0.2 - 1.3 mg/dL Final   • eGFR Non African Amer 04/30/2017 110  70  - 162 mL/min/1.73 Final   • Globulin 04/30/2017 3.8* 2.3 - 3.5 gm/dL Final   • A/G Ratio 04/30/2017 1.0* 1.1 - 1.8 g/dL Final   • BUN/Creatinine Ratio 04/30/2017 11.4  7.0 - 25.0 Final   • Anion Gap 04/30/2017 12.0  5.0 - 15.0 mmol/L Final   • Creatine Kinase 04/30/2017 213* 55 - 170 U/L Final   • CKMB 04/30/2017 2.00  0.00 - 5.00 ng/mL Final   • Troponin I 04/30/2017 <0.012  <=0.034 ng/mL Final   • PTT 04/30/2017 43.9* 20.0 - 40.3 seconds Final   • Protime 04/30/2017 18.1* 11.1 - 15.3 Seconds Final   • INR 04/30/2017 1.50* 0.80 - 1.20 Final   • WBC 04/30/2017 7.86  3.20 - 9.80 10*3/mm3 Final   • RBC 04/30/2017 4.59  4.37 - 5.74 10*6/mm3 Final   • Hemoglobin 04/30/2017 12.9* 13.7 - 17.3 g/dL Final   • Hematocrit 04/30/2017 37.3* 39.0 - 49.0 % Final   • MCV 04/30/2017 81.3  80.0 - 98.0 fL Final   • MCH 04/30/2017 28.1  26.5 - 34.0 pg Final   • MCHC 04/30/2017 34.6  31.5 - 36.3 g/dL Final   • RDW 04/30/2017 14.6* 11.5 - 14.5 % Final   • RDW-SD 04/30/2017 43.4  35.1 - 43.9 fl Final   • MPV 04/30/2017 8.8  8.0 - 12.0 fL Final   • Platelets 04/30/2017 190  150 - 450 10*3/mm3 Final   • Neutrophil % 04/30/2017 71.7  37.0 - 80.0 % Final   • Lymphocyte % 04/30/2017 15.5  10.0 - 50.0 % Final   • Monocyte % 04/30/2017 7.3  0.0 - 12.0 % Final   • Eosinophil % 04/30/2017 3.3  0.0 - 7.0 % Final   • Basophil % 04/30/2017 0.3  0.0 - 2.0 % Final   • Immature Grans % 04/30/2017 1.9* 0.0 - 0.5 % Final   • Neutrophils, Absolute 04/30/2017 5.64  2.00 - 8.60 10*3/mm3 Final   • Lymphocytes, Absolute 04/30/2017 1.22  0.60 - 4.20 10*3/mm3 Final   • Monocytes, Absolute 04/30/2017 0.57  0.00 - 0.90 10*3/mm3 Final   • Eosinophils, Absolute 04/30/2017 0.26  0.00 - 0.70 10*3/mm3 Final   • Basophils, Absolute 04/30/2017 0.02  0.00 - 0.20 10*3/mm3 Final   • Immature Grans, Absolute 04/30/2017 0.15* 0.00 - 0.02 10*3/mm3 Final   • Site 04/30/2017    Final    Not performed at this site.   • Cory's Test 04/30/2017    Final    Not performed at this  site.   • pH, Arterial 04/30/2017 7.387  7.350 - 7.450 pH units Final   • pCO2, Arterial 04/30/2017 43.5  35.0 - 45.0 mm Hg Final   • pO2, Arterial 04/30/2017 83.5  80.0 - 105.0 mm Hg Final   • HCO3, Arterial 04/30/2017 25.6  22.0 - 26.0 mmol/L Final   • Base Excess, Arterial 04/30/2017 0.3  -2.4 - 2.4 mmol/L Final   • O2 Saturation, Arterial 04/30/2017 95.9  % Final   • Hemoglobin, Blood Gas 04/30/2017 13.5* 14 - 18 g/dL Final   • Hematocrit, Blood Gas 04/30/2017 40.0  40.0 - 54.0 % Final   • CO2 Content 04/30/2017 26.9  23 - 27 Final   • Sodium, Arterial 04/30/2017 135.5* 138 - 146 mmol/L Final   • Potassium, Arterial 04/30/2017 4.02  3.6 - 4.9 mmol/L Final   • Glucose, Arterial 04/30/2017 147  mmol/L Final   • Barometric Pressure for Blood Gas 04/30/2017   mmHg Final    Not performed at this site.   • Modality 04/30/2017    Final    Not performed at this site.   • Ionized Calcium 04/30/2017 4.7  4.5 - 4.9 mg/dL Final   • Extra Tube 04/30/2017 Hold for add-ons.   Final    Auto resulted.   • Troponin I 04/30/2017 <0.012  <=0.034 ng/mL Final   • Troponin I 04/30/2017 <0.012  <=0.034 ng/mL Final   • Glucose 04/30/2017 187* 70 - 130 mg/dL Final    Sliding Scale AdminMeter: [id number]: [id number] LISA KRUGER   • Glucose 04/30/2017 155* 70 - 130 mg/dL Final    RN NotifiedMeter: [id number]: [id number] MOHAN OLIVERA   • Troponin I 05/01/2017 <0.012  <=0.034 ng/mL Final   • Glucose 05/01/2017 195* 70 - 130 mg/dL Final    RN NotifiedMeter: [id number]: [id number] MOHAN OLIVERA   • WBC 05/01/2017 8.30  3.20 - 9.80 10*3/mm3 Final   • RBC 05/01/2017 4.25* 4.37 - 5.74 10*6/mm3 Final   • Hemoglobin 05/01/2017 11.8* 13.7 - 17.3 g/dL Final   • Hematocrit 05/01/2017 34.5* 39.0 - 49.0 % Final   • MCV 05/01/2017 81.2  80.0 - 98.0 fL Final   • MCH 05/01/2017 27.8  26.5 - 34.0 pg Final   • MCHC 05/01/2017 34.2  31.5 - 36.3 g/dL Final   • RDW 05/01/2017 14.7* 11.5 - 14.5 % Final   • RDW-SD 05/01/2017 43.5   35.1 - 43.9 fl Final   • MPV 05/01/2017 8.6  8.0 - 12.0 fL Final   • Platelets 05/01/2017 192  150 - 450 10*3/mm3 Final   • Glucose 05/01/2017 183* 60 - 100 mg/dL Final   • BUN 05/01/2017 13  7 - 21 mg/dL Final   • Creatinine 05/01/2017 1.03  0.70 - 1.30 mg/dL Final   • Sodium 05/01/2017 131* 137 - 145 mmol/L Final   • Potassium 05/01/2017 4.1  3.5 - 5.1 mmol/L Final   • Chloride 05/01/2017 94* 95 - 110 mmol/L Final   • CO2 05/01/2017 28.0  22.0 - 31.0 mmol/L Final   • Calcium 05/01/2017 8.4  8.4 - 10.2 mg/dL Final   • eGFR Non African Amer 05/01/2017 81  >60 mL/min/1.73 Final   • BUN/Creatinine Ratio 05/01/2017 12.6  7.0 - 25.0 Final   • Anion Gap 05/01/2017 9.0  5.0 - 15.0 mmol/L Final   • Glucose 05/01/2017 152* 70 - 130 mg/dL Final    RN NotifiedMeter: [id number]: [id number] ZAY DIMA   Admission on 04/22/2017, Discharged on 04/25/2017   No results displayed because visit has over 200 results.      Anticoagulation Visit on 04/19/2017   Component Date Value Ref Range Status   • INR 04/19/2017 2.10* 0.9 - 1.1 Final   Anticoagulation Visit on 04/14/2017   Component Date Value Ref Range Status   • INR 04/14/2017 2.00* 0.9 - 1.1 Final   Anticoagulation Visit on 04/12/2017   Component Date Value Ref Range Status   • INR 04/12/2017 1.60* 0.9 - 1.1 Final   Anticoagulation Visit on 04/10/2017   Component Date Value Ref Range Status   • INR 04/10/2017 1.70* 0.9 - 1.1 Final   Admission on 04/07/2017, Discharged on 04/08/2017   Component Date Value Ref Range Status   • Troponin I 04/07/2017 <0.012  <=0.034 ng/mL Final   • Troponin I 04/07/2017 <0.012  <=0.034 ng/mL Final   • Glucose 04/07/2017 117* 60 - 100 mg/dL Final   • BUN 04/07/2017 13  7 - 21 mg/dL Final   • Creatinine 04/07/2017 1.00  0.70 - 1.30 mg/dL Final   • Sodium 04/07/2017 138  137 - 145 mmol/L Final   • Potassium 04/07/2017 3.9  3.5 - 5.1 mmol/L Final   • Chloride 04/07/2017 97  95 - 110 mmol/L Final   • CO2 04/07/2017 29.0  22.0 - 31.0 mmol/L  Final   • Calcium 04/07/2017 9.1  8.4 - 10.2 mg/dL Final   • Total Protein 04/07/2017 7.9  6.3 - 8.6 g/dL Final   • Albumin 04/07/2017 4.10  3.40 - 4.80 g/dL Final   • ALT (SGPT) 04/07/2017 54  21 - 72 U/L Final   • AST (SGOT) 04/07/2017 51  17 - 59 U/L Final   • Alkaline Phosphatase 04/07/2017 73  38 - 126 U/L Final   • Total Bilirubin 04/07/2017 0.4  0.2 - 1.3 mg/dL Final   • eGFR Non African Amer 04/07/2017 84  >60 mL/min/1.73 Final   • Globulin 04/07/2017 3.8* 2.3 - 3.5 gm/dL Final   • A/G Ratio 04/07/2017 1.1  1.1 - 1.8 g/dL Final   • BUN/Creatinine Ratio 04/07/2017 13.0  7.0 - 25.0 Final   • Anion Gap 04/07/2017 12.0  5.0 - 15.0 mmol/L Final   • proBNP 04/07/2017 26.8  0.0 - 450.0 pg/mL Final   • Extra Tube 04/07/2017 hold for add-on   Final    Auto resulted   • Extra Tube 04/07/2017 Hold for add-ons.   Final    Auto resulted.   • Extra Tube 04/07/2017 hold for add-on   Final    Auto resulted   • Extra Tube 04/07/2017 Hold for add-ons.   Final    Auto resulted.   • WBC 04/07/2017 8.23  3.20 - 9.80 10*3/mm3 Final   • RBC 04/07/2017 4.81  4.37 - 5.74 10*6/mm3 Final   • Hemoglobin 04/07/2017 13.4* 13.7 - 17.3 g/dL Final   • Hematocrit 04/07/2017 39.1  39.0 - 49.0 % Final   • MCV 04/07/2017 81.3  80.0 - 98.0 fL Final   • MCH 04/07/2017 27.9  26.5 - 34.0 pg Final   • MCHC 04/07/2017 34.3  31.5 - 36.3 g/dL Final   • RDW 04/07/2017 14.9* 11.5 - 14.5 % Final   • RDW-SD 04/07/2017 44.2* 35.1 - 43.9 fl Final   • MPV 04/07/2017 8.6  8.0 - 12.0 fL Final   • Platelets 04/07/2017 213  150 - 450 10*3/mm3 Final   • Neutrophil % 04/07/2017 67.5  37.0 - 80.0 % Final   • Lymphocyte % 04/07/2017 18.2  10.0 - 50.0 % Final   • Monocyte % 04/07/2017 9.7  0.0 - 12.0 % Final   • Eosinophil % 04/07/2017 3.3  0.0 - 7.0 % Final   • Basophil % 04/07/2017 0.2  0.0 - 2.0 % Final   • Immature Grans % 04/07/2017 1.1* 0.0 - 0.5 % Final   • Neutrophils, Absolute 04/07/2017 5.55  2.00 - 8.60 10*3/mm3 Final   • Lymphocytes, Absolute 04/07/2017  1.50  0.60 - 4.20 10*3/mm3 Final   • Monocytes, Absolute 04/07/2017 0.80  0.00 - 0.90 10*3/mm3 Final   • Eosinophils, Absolute 04/07/2017 0.27  0.00 - 0.70 10*3/mm3 Final   • Basophils, Absolute 04/07/2017 0.02  0.00 - 0.20 10*3/mm3 Final   • Immature Grans, Absolute 04/07/2017 0.09* 0.00 - 0.02 10*3/mm3 Final   • Creatine Kinase 04/07/2017 237* 55 - 170 U/L Final   • CKMB 04/07/2017 1.93  0.00 - 5.00 ng/mL Final   • PTT 04/07/2017 42.6* 20.0 - 40.3 seconds Final   • WBC 04/07/2017 7.25  3.20 - 9.80 10*3/mm3 Final   • RBC 04/07/2017 4.79  4.37 - 5.74 10*6/mm3 Final   • Hemoglobin 04/07/2017 13.2* 13.7 - 17.3 g/dL Final   • Hematocrit 04/07/2017 38.8* 39.0 - 49.0 % Final   • MCV 04/07/2017 81.0  80.0 - 98.0 fL Final   • MCH 04/07/2017 27.6  26.5 - 34.0 pg Final   • MCHC 04/07/2017 34.0  31.5 - 36.3 g/dL Final   • RDW 04/07/2017 14.9* 11.5 - 14.5 % Final   • RDW-SD 04/07/2017 43.9  35.1 - 43.9 fl Final   • MPV 04/07/2017 8.7  8.0 - 12.0 fL Final   • Platelets 04/07/2017 192  150 - 450 10*3/mm3 Final   • Neutrophil % 04/07/2017 57.2  37.0 - 80.0 % Final   • Lymphocyte % 04/07/2017 26.8  10.0 - 50.0 % Final   • Monocyte % 04/07/2017 10.2  0.0 - 12.0 % Final   • Eosinophil % 04/07/2017 4.0  0.0 - 7.0 % Final   • Basophil % 04/07/2017 0.3  0.0 - 2.0 % Final   • Immature Grans % 04/07/2017 1.5* 0.0 - 0.5 % Final   • Neutrophils, Absolute 04/07/2017 4.15  2.00 - 8.60 10*3/mm3 Final   • Lymphocytes, Absolute 04/07/2017 1.94  0.60 - 4.20 10*3/mm3 Final   • Monocytes, Absolute 04/07/2017 0.74  0.00 - 0.90 10*3/mm3 Final   • Eosinophils, Absolute 04/07/2017 0.29  0.00 - 0.70 10*3/mm3 Final   • Basophils, Absolute 04/07/2017 0.02  0.00 - 0.20 10*3/mm3 Final   • Immature Grans, Absolute 04/07/2017 0.11* 0.00 - 0.02 10*3/mm3 Final   • Protime 04/07/2017 17.2* 11.1 - 15.3 Seconds Final   • INR 04/07/2017 1.41* 0.80 - 1.20 Final   • Glucose 04/07/2017 99  70 - 130 mg/dL Final    Meter: [id number]: [id number] JUVE  LULI   • Protime 04/08/2017 17.9* 11.1 - 15.3 Seconds Final   • INR 04/08/2017 1.48* 0.80 - 1.20 Final   • Glucose 04/08/2017 162* 60 - 100 mg/dL Final   • BUN 04/08/2017 10  7 - 21 mg/dL Final   • Creatinine 04/08/2017 0.88  0.70 - 1.30 mg/dL Final   • Sodium 04/08/2017 136* 137 - 145 mmol/L Final   • Potassium 04/08/2017 3.9  3.5 - 5.1 mmol/L Final   • Chloride 04/08/2017 99  95 - 110 mmol/L Final   • CO2 04/08/2017 26.0  22.0 - 31.0 mmol/L Final   • Calcium 04/08/2017 8.3* 8.4 - 10.2 mg/dL Final   • eGFR Non African Amer 04/08/2017 97  >60 mL/min/1.73 Final   • BUN/Creatinine Ratio 04/08/2017 11.4  7.0 - 25.0 Final   • Anion Gap 04/08/2017 11.0  5.0 - 15.0 mmol/L Final   • WBC 04/08/2017 7.32  3.20 - 9.80 10*3/mm3 Final   • RBC 04/08/2017 4.63  4.37 - 5.74 10*6/mm3 Final   • Hemoglobin 04/08/2017 12.8* 13.7 - 17.3 g/dL Final   • Hematocrit 04/08/2017 38.1* 39.0 - 49.0 % Final   • MCV 04/08/2017 82.3  80.0 - 98.0 fL Final   • MCH 04/08/2017 27.6  26.5 - 34.0 pg Final   • MCHC 04/08/2017 33.6  31.5 - 36.3 g/dL Final   • RDW 04/08/2017 15.1* 11.5 - 14.5 % Final   • RDW-SD 04/08/2017 45.2* 35.1 - 43.9 fl Final   • MPV 04/08/2017 8.7  8.0 - 12.0 fL Final   • Platelets 04/08/2017 165  150 - 450 10*3/mm3 Final   • Neutrophil % 04/08/2017 57.7  37.0 - 80.0 % Final   • Lymphocyte % 04/08/2017 26.4  10.0 - 50.0 % Final   • Monocyte % 04/08/2017 10.0  0.0 - 12.0 % Final   • Eosinophil % 04/08/2017 4.2  0.0 - 7.0 % Final   • Basophil % 04/08/2017 0.3  0.0 - 2.0 % Final   • Immature Grans % 04/08/2017 1.4* 0.0 - 0.5 % Final   • Neutrophils, Absolute 04/08/2017 4.23  2.00 - 8.60 10*3/mm3 Final   • Lymphocytes, Absolute 04/08/2017 1.93  0.60 - 4.20 10*3/mm3 Final   • Monocytes, Absolute 04/08/2017 0.73  0.00 - 0.90 10*3/mm3 Final   • Eosinophils, Absolute 04/08/2017 0.31  0.00 - 0.70 10*3/mm3 Final   • Basophils, Absolute 04/08/2017 0.02  0.00 - 0.20 10*3/mm3 Final   • Immature Grans, Absolute 04/08/2017 0.10* 0.00 - 0.02  10*3/mm3 Final   • nRBC 04/08/2017 0.0  0.0 - 0.0 /100 WBC Final   • RBC Morphology 04/08/2017 Normal  Normal Final   • WBC Morphology 04/08/2017 Normal  Normal Final   • Platelet Morphology 04/08/2017 Normal  Normal Final   • Glucose 04/08/2017 145* 70 - 130 mg/dL Final    RN NotifiedMeter: [id number]: [id number] JUVE LULI   • Troponin I 04/08/2017 <0.012  <=0.034 ng/mL Final   • Glucose 04/08/2017 154* 70 - 130 mg/dL Final    RN NotifiedMeter: [id number]: [id number] FERCHO CASTILLO   Anticoagulation Visit on 04/07/2017   Component Date Value Ref Range Status   • INR 04/07/2017 1.40* 0.9 - 1.1 Final   There may be more visits with results that are not included.  Labs have been independently reviewed    This document has been electronically signed by LALA Barajas on June 2, 2017 8:15 AM

## 2017-06-03 ENCOUNTER — APPOINTMENT (OUTPATIENT)
Dept: GENERAL RADIOLOGY | Facility: HOSPITAL | Age: 37
End: 2017-06-03

## 2017-06-03 ENCOUNTER — HOSPITAL ENCOUNTER (OUTPATIENT)
Facility: HOSPITAL | Age: 37
Setting detail: OBSERVATION
Discharge: HOME | End: 2017-06-04
Attending: EMERGENCY MEDICINE | Admitting: INTERNAL MEDICINE

## 2017-06-03 DIAGNOSIS — I50.33 ACUTE ON CHRONIC DIASTOLIC CONGESTIVE HEART FAILURE (HCC): ICD-10-CM

## 2017-06-03 DIAGNOSIS — R07.9 CHEST PAIN, UNSPECIFIED TYPE: Primary | ICD-10-CM

## 2017-06-03 DIAGNOSIS — R06.02 SOB (SHORTNESS OF BREATH): ICD-10-CM

## 2017-06-03 LAB
ALBUMIN SERPL-MCNC: 3.5 G/DL (ref 3.4–4.8)
ALBUMIN/GLOB SERPL: 1 G/DL (ref 1.1–1.8)
ALP SERPL-CCNC: 62 U/L (ref 38–126)
ALT SERPL W P-5'-P-CCNC: 47 U/L (ref 21–72)
ANION GAP SERPL CALCULATED.3IONS-SCNC: 11 MMOL/L (ref 5–15)
AST SERPL-CCNC: 22 U/L (ref 17–59)
BASOPHILS # BLD AUTO: 0.01 10*3/MM3 (ref 0–0.2)
BASOPHILS NFR BLD AUTO: 0.1 % (ref 0–2)
BILIRUB SERPL-MCNC: 0.3 MG/DL (ref 0.2–1.3)
BUN BLD-MCNC: 3 MG/DL (ref 7–21)
BUN/CREAT SERPL: 4.5 (ref 7–25)
CALCIUM SPEC-SCNC: 8.3 MG/DL (ref 8.4–10.2)
CHLORIDE SERPL-SCNC: 102 MMOL/L (ref 95–110)
CK MB SERPL-CCNC: 1.93 NG/ML (ref 0–5)
CK SERPL-CCNC: 229 U/L (ref 55–170)
CO2 SERPL-SCNC: 24 MMOL/L (ref 22–31)
CREAT BLD-MCNC: 0.67 MG/DL (ref 0.7–1.3)
DEPRECATED RDW RBC AUTO: 44.4 FL (ref 35.1–43.9)
EOSINOPHIL # BLD AUTO: 0.29 10*3/MM3 (ref 0–0.7)
EOSINOPHIL NFR BLD AUTO: 3.9 % (ref 0–7)
ERYTHROCYTE [DISTWIDTH] IN BLOOD BY AUTOMATED COUNT: 14.6 % (ref 11.5–14.5)
GFR SERPL CREATININE-BSD FRML MDRD: 133 ML/MIN/1.73 (ref 70–162)
GLOBULIN UR ELPH-MCNC: 3.5 GM/DL (ref 2.3–3.5)
GLUCOSE BLD-MCNC: 268 MG/DL (ref 60–100)
HCT VFR BLD AUTO: 36.7 % (ref 39–49)
HGB BLD-MCNC: 11.9 G/DL (ref 13.7–17.3)
HOLD SPECIMEN: NORMAL
HOLD SPECIMEN: NORMAL
IMM GRANULOCYTES # BLD: 0.06 10*3/MM3 (ref 0–0.02)
IMM GRANULOCYTES NFR BLD: 0.8 % (ref 0–0.5)
INR PPP: 0.92 (ref 0.8–1.2)
LYMPHOCYTES # BLD AUTO: 1.24 10*3/MM3 (ref 0.6–4.2)
LYMPHOCYTES NFR BLD AUTO: 16.8 % (ref 10–50)
MAGNESIUM SERPL-MCNC: 1.9 MG/DL (ref 1.6–2.3)
MCH RBC QN AUTO: 27.2 PG (ref 26.5–34)
MCHC RBC AUTO-ENTMCNC: 32.4 G/DL (ref 31.5–36.3)
MCV RBC AUTO: 84 FL (ref 80–98)
MONOCYTES # BLD AUTO: 0.64 10*3/MM3 (ref 0–0.9)
MONOCYTES NFR BLD AUTO: 8.7 % (ref 0–12)
NEUTROPHILS # BLD AUTO: 5.13 10*3/MM3 (ref 2–8.6)
NEUTROPHILS NFR BLD AUTO: 69.7 % (ref 37–80)
NT-PROBNP SERPL-MCNC: 86.9 PG/ML (ref 0–450)
PLATELET # BLD AUTO: 157 10*3/MM3 (ref 150–450)
PMV BLD AUTO: 9.1 FL (ref 8–12)
POTASSIUM BLD-SCNC: 3.6 MMOL/L (ref 3.5–5.1)
PROT SERPL-MCNC: 7 G/DL (ref 6.3–8.6)
PROTHROMBIN TIME: 12.3 SECONDS (ref 11.1–15.3)
RBC # BLD AUTO: 4.37 10*6/MM3 (ref 4.37–5.74)
SODIUM BLD-SCNC: 137 MMOL/L (ref 137–145)
TROPONIN I SERPL-MCNC: <0.012 NG/ML
TROPONIN I SERPL-MCNC: <0.012 NG/ML
WBC NRBC COR # BLD: 7.37 10*3/MM3 (ref 3.2–9.8)
WHOLE BLOOD HOLD SPECIMEN: NORMAL
WHOLE BLOOD HOLD SPECIMEN: NORMAL

## 2017-06-03 PROCEDURE — 96376 TX/PRO/DX INJ SAME DRUG ADON: CPT

## 2017-06-03 PROCEDURE — 80053 COMPREHEN METABOLIC PANEL: CPT | Performed by: NURSE PRACTITIONER

## 2017-06-03 PROCEDURE — 82550 ASSAY OF CK (CPK): CPT | Performed by: NURSE PRACTITIONER

## 2017-06-03 PROCEDURE — 96375 TX/PRO/DX INJ NEW DRUG ADDON: CPT

## 2017-06-03 PROCEDURE — G0378 HOSPITAL OBSERVATION PER HR: HCPCS

## 2017-06-03 PROCEDURE — 85610 PROTHROMBIN TIME: CPT | Performed by: NURSE PRACTITIONER

## 2017-06-03 PROCEDURE — 94799 UNLISTED PULMONARY SVC/PX: CPT

## 2017-06-03 PROCEDURE — 94760 N-INVAS EAR/PLS OXIMETRY 1: CPT

## 2017-06-03 PROCEDURE — 25010000002 LORAZEPAM PER 2 MG: Performed by: NURSE PRACTITIONER

## 2017-06-03 PROCEDURE — 83880 ASSAY OF NATRIURETIC PEPTIDE: CPT | Performed by: NURSE PRACTITIONER

## 2017-06-03 PROCEDURE — 82553 CREATINE MB FRACTION: CPT | Performed by: NURSE PRACTITIONER

## 2017-06-03 PROCEDURE — 25010000002 FUROSEMIDE PER 20 MG: Performed by: NURSE PRACTITIONER

## 2017-06-03 PROCEDURE — 83735 ASSAY OF MAGNESIUM: CPT | Performed by: NURSE PRACTITIONER

## 2017-06-03 PROCEDURE — 71010 HC CHEST PA OR AP: CPT

## 2017-06-03 PROCEDURE — 85025 COMPLETE CBC W/AUTO DIFF WBC: CPT | Performed by: NURSE PRACTITIONER

## 2017-06-03 PROCEDURE — 99284 EMERGENCY DEPT VISIT MOD MDM: CPT

## 2017-06-03 PROCEDURE — 96374 THER/PROPH/DIAG INJ IV PUSH: CPT

## 2017-06-03 PROCEDURE — 93010 ELECTROCARDIOGRAM REPORT: CPT | Performed by: INTERNAL MEDICINE

## 2017-06-03 PROCEDURE — 84484 ASSAY OF TROPONIN QUANT: CPT | Performed by: NURSE PRACTITIONER

## 2017-06-03 PROCEDURE — 93005 ELECTROCARDIOGRAM TRACING: CPT

## 2017-06-03 PROCEDURE — 25010000002 MORPHINE SULFATE (PF) 2 MG/ML SOLUTION: Performed by: NURSE PRACTITIONER

## 2017-06-03 RX ORDER — MORPHINE SULFATE 2 MG/ML
2 INJECTION, SOLUTION INTRAMUSCULAR; INTRAVENOUS ONCE
Status: COMPLETED | OUTPATIENT
Start: 2017-06-03 | End: 2017-06-03

## 2017-06-03 RX ORDER — FUROSEMIDE 10 MG/ML
40 INJECTION INTRAMUSCULAR; INTRAVENOUS ONCE
Status: COMPLETED | OUTPATIENT
Start: 2017-06-03 | End: 2017-06-03

## 2017-06-03 RX ORDER — LORAZEPAM 2 MG/ML
1 INJECTION INTRAMUSCULAR ONCE
Status: COMPLETED | OUTPATIENT
Start: 2017-06-03 | End: 2017-06-03

## 2017-06-03 RX ORDER — SODIUM CHLORIDE 0.9 % (FLUSH) 0.9 %
10 SYRINGE (ML) INJECTION AS NEEDED
Status: DISCONTINUED | OUTPATIENT
Start: 2017-06-03 | End: 2017-06-04 | Stop reason: HOSPADM

## 2017-06-03 RX ORDER — NITROGLYCERIN 0.4 MG/1
0.4 TABLET SUBLINGUAL ONCE
Status: COMPLETED | OUTPATIENT
Start: 2017-06-03 | End: 2017-06-03

## 2017-06-03 RX ORDER — ASPIRIN 81 MG/1
243 TABLET, CHEWABLE ORAL DAILY
Status: DISCONTINUED | OUTPATIENT
Start: 2017-06-03 | End: 2017-06-04 | Stop reason: HOSPADM

## 2017-06-03 RX ORDER — ALBUTEROL SULFATE 2.5 MG/3ML
2.5 SOLUTION RESPIRATORY (INHALATION) ONCE
Status: COMPLETED | OUTPATIENT
Start: 2017-06-03 | End: 2017-06-03

## 2017-06-03 RX ADMIN — ALBUTEROL SULFATE 2.5 MG: 2.5 SOLUTION RESPIRATORY (INHALATION) at 19:50

## 2017-06-03 RX ADMIN — NITROGLYCERIN 1 INCH: 20 OINTMENT TOPICAL at 19:51

## 2017-06-03 RX ADMIN — NITROGLYCERIN 0.4 MG: 0.4 TABLET SUBLINGUAL at 19:49

## 2017-06-03 RX ADMIN — FUROSEMIDE 40 MG: 10 INJECTION, SOLUTION INTRAMUSCULAR; INTRAVENOUS at 19:44

## 2017-06-03 RX ADMIN — LORAZEPAM 1 MG: 2 INJECTION INTRAMUSCULAR; INTRAVENOUS at 20:53

## 2017-06-03 RX ADMIN — ASPIRIN 81 MG 243 MG: 81 TABLET ORAL at 19:40

## 2017-06-03 RX ADMIN — MORPHINE SULFATE 2 MG: 2 INJECTION, SOLUTION INTRAMUSCULAR; INTRAVENOUS at 19:41

## 2017-06-03 RX ADMIN — MORPHINE SULFATE 2 MG: 2 INJECTION, SOLUTION INTRAMUSCULAR; INTRAVENOUS at 20:16

## 2017-06-04 VITALS
SYSTOLIC BLOOD PRESSURE: 165 MMHG | HEART RATE: 99 BPM | OXYGEN SATURATION: 95 % | BODY MASS INDEX: 44.1 KG/M2 | TEMPERATURE: 97.7 F | RESPIRATION RATE: 20 BRPM | DIASTOLIC BLOOD PRESSURE: 75 MMHG | WEIGHT: 315 LBS | HEIGHT: 71 IN

## 2017-06-04 LAB
ANION GAP SERPL CALCULATED.3IONS-SCNC: 8 MMOL/L (ref 5–15)
ANION GAP SERPL CALCULATED.3IONS-SCNC: 9 MMOL/L (ref 5–15)
BASOPHILS # BLD AUTO: 0.01 10*3/MM3 (ref 0–0.2)
BASOPHILS # BLD AUTO: 0.03 10*3/MM3 (ref 0–0.2)
BASOPHILS NFR BLD AUTO: 0.1 % (ref 0–2)
BASOPHILS NFR BLD AUTO: 0.3 % (ref 0–2)
BUN BLD-MCNC: 3 MG/DL (ref 7–21)
BUN BLD-MCNC: 4 MG/DL (ref 7–21)
BUN/CREAT SERPL: 4.5 (ref 7–25)
BUN/CREAT SERPL: 5.6 (ref 7–25)
CALCIUM SPEC-SCNC: 8.5 MG/DL (ref 8.4–10.2)
CALCIUM SPEC-SCNC: 8.6 MG/DL (ref 8.4–10.2)
CHLORIDE SERPL-SCNC: 100 MMOL/L (ref 95–110)
CHLORIDE SERPL-SCNC: 102 MMOL/L (ref 95–110)
CO2 SERPL-SCNC: 26 MMOL/L (ref 22–31)
CO2 SERPL-SCNC: 28 MMOL/L (ref 22–31)
CREAT BLD-MCNC: 0.67 MG/DL (ref 0.7–1.3)
CREAT BLD-MCNC: 0.71 MG/DL (ref 0.7–1.3)
DEPRECATED RDW RBC AUTO: 44.1 FL (ref 35.1–43.9)
DEPRECATED RDW RBC AUTO: 45.3 FL (ref 35.1–43.9)
EOSINOPHIL # BLD AUTO: 0.36 10*3/MM3 (ref 0–0.7)
EOSINOPHIL # BLD AUTO: 0.41 10*3/MM3 (ref 0–0.7)
EOSINOPHIL NFR BLD AUTO: 4.6 % (ref 0–7)
EOSINOPHIL NFR BLD AUTO: 4.7 % (ref 0–7)
ERYTHROCYTE [DISTWIDTH] IN BLOOD BY AUTOMATED COUNT: 14.6 % (ref 11.5–14.5)
ERYTHROCYTE [DISTWIDTH] IN BLOOD BY AUTOMATED COUNT: 14.9 % (ref 11.5–14.5)
GFR SERPL CREATININE-BSD FRML MDRD: 124 ML/MIN/1.73 (ref 60–162)
GFR SERPL CREATININE-BSD FRML MDRD: 133 ML/MIN/1.73 (ref 60–162)
GLUCOSE BLD-MCNC: 143 MG/DL (ref 60–100)
GLUCOSE BLD-MCNC: 146 MG/DL (ref 60–100)
GLUCOSE BLDC GLUCOMTR-MCNC: 151 MG/DL (ref 70–130)
HCT VFR BLD AUTO: 36 % (ref 39–49)
HCT VFR BLD AUTO: 37.3 % (ref 39–49)
HGB BLD-MCNC: 11.9 G/DL (ref 13.7–17.3)
HGB BLD-MCNC: 12.5 G/DL (ref 13.7–17.3)
IMM GRANULOCYTES # BLD: 0.09 10*3/MM3 (ref 0–0.02)
IMM GRANULOCYTES # BLD: 0.1 10*3/MM3 (ref 0–0.02)
IMM GRANULOCYTES NFR BLD: 1.1 % (ref 0–0.5)
IMM GRANULOCYTES NFR BLD: 1.1 % (ref 0–0.5)
LYMPHOCYTES # BLD AUTO: 1.71 10*3/MM3 (ref 0.6–4.2)
LYMPHOCYTES # BLD AUTO: 1.76 10*3/MM3 (ref 0.6–4.2)
LYMPHOCYTES NFR BLD AUTO: 20.1 % (ref 10–50)
LYMPHOCYTES NFR BLD AUTO: 21.6 % (ref 10–50)
MAGNESIUM SERPL-MCNC: 1.8 MG/DL (ref 1.6–2.3)
MAGNESIUM SERPL-MCNC: 1.9 MG/DL (ref 1.6–2.3)
MCH RBC QN AUTO: 27.5 PG (ref 26.5–34)
MCH RBC QN AUTO: 27.8 PG (ref 26.5–34)
MCHC RBC AUTO-ENTMCNC: 33.1 G/DL (ref 31.5–36.3)
MCHC RBC AUTO-ENTMCNC: 33.5 G/DL (ref 31.5–36.3)
MCV RBC AUTO: 82.9 FL (ref 80–98)
MCV RBC AUTO: 83.1 FL (ref 80–98)
MONOCYTES # BLD AUTO: 0.67 10*3/MM3 (ref 0–0.9)
MONOCYTES # BLD AUTO: 0.79 10*3/MM3 (ref 0–0.9)
MONOCYTES NFR BLD AUTO: 8.5 % (ref 0–12)
MONOCYTES NFR BLD AUTO: 9 % (ref 0–12)
NEUTROPHILS # BLD AUTO: 5.06 10*3/MM3 (ref 2–8.6)
NEUTROPHILS # BLD AUTO: 5.66 10*3/MM3 (ref 2–8.6)
NEUTROPHILS NFR BLD AUTO: 64.1 % (ref 37–80)
NEUTROPHILS NFR BLD AUTO: 64.8 % (ref 37–80)
PLATELET # BLD AUTO: 170 10*3/MM3 (ref 150–450)
PLATELET # BLD AUTO: 199 10*3/MM3 (ref 150–450)
PMV BLD AUTO: 9.1 FL (ref 8–12)
PMV BLD AUTO: 9.3 FL (ref 8–12)
POTASSIUM BLD-SCNC: 3.6 MMOL/L (ref 3.5–5.1)
POTASSIUM BLD-SCNC: 3.9 MMOL/L (ref 3.5–5.1)
RBC # BLD AUTO: 4.33 10*6/MM3 (ref 4.37–5.74)
RBC # BLD AUTO: 4.5 10*6/MM3 (ref 4.37–5.74)
SODIUM BLD-SCNC: 136 MMOL/L (ref 137–145)
SODIUM BLD-SCNC: 137 MMOL/L (ref 137–145)
TROPONIN I SERPL-MCNC: <0.012 NG/ML
WBC NRBC COR # BLD: 7.9 10*3/MM3 (ref 3.2–9.8)
WBC NRBC COR # BLD: 8.75 10*3/MM3 (ref 3.2–9.8)

## 2017-06-04 PROCEDURE — G0378 HOSPITAL OBSERVATION PER HR: HCPCS

## 2017-06-04 PROCEDURE — 25010000002 MORPHINE SULFATE (PF) 2 MG/ML SOLUTION: Performed by: INTERNAL MEDICINE

## 2017-06-04 PROCEDURE — 84484 ASSAY OF TROPONIN QUANT: CPT | Performed by: INTERNAL MEDICINE

## 2017-06-04 PROCEDURE — 82962 GLUCOSE BLOOD TEST: CPT

## 2017-06-04 PROCEDURE — 63710000001 INSULIN ASPART PER 5 UNITS: Performed by: INTERNAL MEDICINE

## 2017-06-04 PROCEDURE — 83735 ASSAY OF MAGNESIUM: CPT | Performed by: INTERNAL MEDICINE

## 2017-06-04 PROCEDURE — 96376 TX/PRO/DX INJ SAME DRUG ADON: CPT

## 2017-06-04 PROCEDURE — 85025 COMPLETE CBC W/AUTO DIFF WBC: CPT | Performed by: INTERNAL MEDICINE

## 2017-06-04 PROCEDURE — 80048 BASIC METABOLIC PNL TOTAL CA: CPT | Performed by: INTERNAL MEDICINE

## 2017-06-04 PROCEDURE — 25010000002 LORAZEPAM PER 2 MG: Performed by: INTERNAL MEDICINE

## 2017-06-04 RX ORDER — PRAMIPEXOLE DIHYDROCHLORIDE 1 MG/1
1 TABLET ORAL NIGHTLY
Status: DISCONTINUED | OUTPATIENT
Start: 2017-06-04 | End: 2017-06-04 | Stop reason: HOSPADM

## 2017-06-04 RX ORDER — AMLODIPINE BESYLATE 10 MG/1
10 TABLET ORAL NIGHTLY
Status: DISCONTINUED | OUTPATIENT
Start: 2017-06-04 | End: 2017-06-04 | Stop reason: HOSPADM

## 2017-06-04 RX ORDER — CLOPIDOGREL BISULFATE 75 MG/1
75 TABLET ORAL DAILY
Status: DISCONTINUED | OUTPATIENT
Start: 2017-06-04 | End: 2017-06-04 | Stop reason: HOSPADM

## 2017-06-04 RX ORDER — ISOSORBIDE MONONITRATE 60 MG/1
120 TABLET, EXTENDED RELEASE ORAL EVERY MORNING
Status: DISCONTINUED | OUTPATIENT
Start: 2017-06-04 | End: 2017-06-04 | Stop reason: HOSPADM

## 2017-06-04 RX ORDER — METOPROLOL SUCCINATE 100 MG/1
100 TABLET, EXTENDED RELEASE ORAL NIGHTLY
Status: DISCONTINUED | OUTPATIENT
Start: 2017-06-04 | End: 2017-06-04 | Stop reason: HOSPADM

## 2017-06-04 RX ORDER — FAMOTIDINE 40 MG/1
40 TABLET, FILM COATED ORAL DAILY
Status: DISCONTINUED | OUTPATIENT
Start: 2017-06-04 | End: 2017-06-04 | Stop reason: HOSPADM

## 2017-06-04 RX ORDER — ONDANSETRON 2 MG/ML
4 INJECTION INTRAMUSCULAR; INTRAVENOUS EVERY 6 HOURS PRN
Status: DISCONTINUED | OUTPATIENT
Start: 2017-06-04 | End: 2017-06-04 | Stop reason: HOSPADM

## 2017-06-04 RX ORDER — TRAZODONE HYDROCHLORIDE 150 MG/1
300 TABLET ORAL NIGHTLY
Status: DISCONTINUED | OUTPATIENT
Start: 2017-06-04 | End: 2017-06-04 | Stop reason: HOSPADM

## 2017-06-04 RX ORDER — FUROSEMIDE 40 MG/1
40 TABLET ORAL DAILY
Status: DISCONTINUED | OUTPATIENT
Start: 2017-06-04 | End: 2017-06-04 | Stop reason: HOSPADM

## 2017-06-04 RX ORDER — PANTOPRAZOLE SODIUM 40 MG/1
40 TABLET, DELAYED RELEASE ORAL NIGHTLY
Status: DISCONTINUED | OUTPATIENT
Start: 2017-06-04 | End: 2017-06-04 | Stop reason: HOSPADM

## 2017-06-04 RX ORDER — RANOLAZINE 500 MG/1
1000 TABLET, EXTENDED RELEASE ORAL EVERY 12 HOURS SCHEDULED
Status: DISCONTINUED | OUTPATIENT
Start: 2017-06-04 | End: 2017-06-04 | Stop reason: HOSPADM

## 2017-06-04 RX ORDER — DEXTROSE MONOHYDRATE 25 G/50ML
25 INJECTION, SOLUTION INTRAVENOUS
Status: DISCONTINUED | OUTPATIENT
Start: 2017-06-04 | End: 2017-06-04 | Stop reason: HOSPADM

## 2017-06-04 RX ORDER — ATORVASTATIN CALCIUM 20 MG/1
20 TABLET, FILM COATED ORAL DAILY
Status: DISCONTINUED | OUTPATIENT
Start: 2017-06-04 | End: 2017-06-04 | Stop reason: HOSPADM

## 2017-06-04 RX ORDER — CLINDAMYCIN HYDROCHLORIDE 150 MG/1
300 CAPSULE ORAL EVERY 8 HOURS SCHEDULED
Status: DISCONTINUED | OUTPATIENT
Start: 2017-06-04 | End: 2017-06-04 | Stop reason: HOSPADM

## 2017-06-04 RX ORDER — LORAZEPAM 2 MG/ML
1 INJECTION INTRAMUSCULAR EVERY 6 HOURS PRN
Status: DISCONTINUED | OUTPATIENT
Start: 2017-06-04 | End: 2017-06-04 | Stop reason: HOSPADM

## 2017-06-04 RX ORDER — NICOTINE POLACRILEX 4 MG
15 LOZENGE BUCCAL
Status: DISCONTINUED | OUTPATIENT
Start: 2017-06-04 | End: 2017-06-04 | Stop reason: HOSPADM

## 2017-06-04 RX ORDER — SERTRALINE HYDROCHLORIDE 25 MG/1
25 TABLET, FILM COATED ORAL NIGHTLY
Status: DISCONTINUED | OUTPATIENT
Start: 2017-06-04 | End: 2017-06-04 | Stop reason: HOSPADM

## 2017-06-04 RX ORDER — LISINOPRIL 40 MG/1
40 TABLET ORAL EVERY MORNING
Status: DISCONTINUED | OUTPATIENT
Start: 2017-06-04 | End: 2017-06-04 | Stop reason: HOSPADM

## 2017-06-04 RX ORDER — MORPHINE SULFATE 2 MG/ML
1 INJECTION, SOLUTION INTRAMUSCULAR; INTRAVENOUS EVERY 4 HOURS PRN
Status: DISCONTINUED | OUTPATIENT
Start: 2017-06-04 | End: 2017-06-04 | Stop reason: HOSPADM

## 2017-06-04 RX ORDER — ALBUTEROL SULFATE 2.5 MG/3ML
2.5 SOLUTION RESPIRATORY (INHALATION) EVERY 6 HOURS PRN
Status: DISCONTINUED | OUTPATIENT
Start: 2017-06-04 | End: 2017-06-04 | Stop reason: HOSPADM

## 2017-06-04 RX ORDER — SODIUM CHLORIDE 0.9 % (FLUSH) 0.9 %
1-10 SYRINGE (ML) INJECTION AS NEEDED
Status: DISCONTINUED | OUTPATIENT
Start: 2017-06-04 | End: 2017-06-04 | Stop reason: HOSPADM

## 2017-06-04 RX ADMIN — MORPHINE SULFATE 1 MG: 2 INJECTION, SOLUTION INTRAMUSCULAR; INTRAVENOUS at 01:14

## 2017-06-04 RX ADMIN — CLOPIDOGREL BISULFATE 75 MG: 75 TABLET ORAL at 09:10

## 2017-06-04 RX ADMIN — PRAMIPEXOLE DIHYDROCHLORIDE 1 MG: 1 TABLET ORAL at 00:58

## 2017-06-04 RX ADMIN — NITROGLYCERIN 1 INCH: 20 OINTMENT TOPICAL at 01:04

## 2017-06-04 RX ADMIN — RANOLAZINE 1000 MG: 500 TABLET, FILM COATED, EXTENDED RELEASE ORAL at 00:57

## 2017-06-04 RX ADMIN — NITROGLYCERIN 1 INCH: 20 OINTMENT TOPICAL at 06:37

## 2017-06-04 RX ADMIN — RANOLAZINE 1000 MG: 500 TABLET, FILM COATED, EXTENDED RELEASE ORAL at 09:12

## 2017-06-04 RX ADMIN — LISINOPRIL 40 MG: 40 TABLET ORAL at 06:36

## 2017-06-04 RX ADMIN — ASPIRIN 81 MG 243 MG: 81 TABLET ORAL at 09:09

## 2017-06-04 RX ADMIN — CLINDAMYCIN HYDROCHLORIDE 300 MG: 150 CAPSULE ORAL at 06:35

## 2017-06-04 RX ADMIN — FUROSEMIDE 40 MG: 40 TABLET ORAL at 09:10

## 2017-06-04 RX ADMIN — ISOSORBIDE MONONITRATE 120 MG: 60 TABLET, EXTENDED RELEASE ORAL at 06:36

## 2017-06-04 RX ADMIN — AMLODIPINE BESYLATE 10 MG: 10 TABLET ORAL at 00:58

## 2017-06-04 RX ADMIN — INSULIN ASPART 2 UNITS: 100 INJECTION, SOLUTION INTRAVENOUS; SUBCUTANEOUS at 09:11

## 2017-06-04 RX ADMIN — SERTRALINE 25 MG: 25 TABLET, FILM COATED ORAL at 00:58

## 2017-06-04 RX ADMIN — FAMOTIDINE 40 MG: 40 TABLET ORAL at 09:09

## 2017-06-04 RX ADMIN — TRAZODONE HYDROCHLORIDE 300 MG: 150 TABLET ORAL at 00:57

## 2017-06-04 RX ADMIN — PANTOPRAZOLE SODIUM 40 MG: 40 TABLET, DELAYED RELEASE ORAL at 00:58

## 2017-06-04 RX ADMIN — METOPROLOL SUCCINATE 100 MG: 100 TABLET, EXTENDED RELEASE ORAL at 00:58

## 2017-06-04 RX ADMIN — METFORMIN HYDROCHLORIDE 1000 MG: 500 TABLET ORAL at 09:11

## 2017-06-04 RX ADMIN — ATORVASTATIN CALCIUM 20 MG: 20 TABLET, FILM COATED ORAL at 09:10

## 2017-06-04 RX ADMIN — LORAZEPAM 1 MG: 2 INJECTION INTRAMUSCULAR; INTRAVENOUS at 01:05

## 2017-06-04 NOTE — H&P
[facility] Medicine Admission      Date of Admission: 6/3/2017      Primary Care Physician: Edwige Briceno MD      Chief Complaint:  Chest pain    HPI: 35 yo male with known CAD and anxiety presented to ER c/o chest pressure, similar to may previous admissions. Pain seemed to improve with ativan. He also c/o dyspnea which seemed to improve with ativan as well. Currently more comfortable with no chest discomfort.    Past Medical History:  has a past medical history of Allergic rhinitis; Asthma; Chest pain; Chronic back pain; Chronic pain; Diabetes mellitus; GERD (gastroesophageal reflux disease); Hyperlipidemia; Hypertension; Low back pain; Morbid obesity; Pulmonary embolism; RLS (restless legs syndrome); Seizures; and Sleep apnea.    Past Surgical History:  has a past surgical history that includes transesophageal echocardiogram (travis); rt/lt heart catheters (N/A, 3/15/2017); Cardiac catheterization (N/A, 3/15/2017); and Cardiac catheterization (N/A, 3/15/2017).    Family History: family history includes Cancer in his other; Coronary artery disease in his other; Diabetes in his other; Heart disease in his other; Hypertension in his other.    Social History:  reports that he has quit smoking. His smokeless tobacco use includes Snuff. He reports that he does not drink alcohol or use illicit drugs.    Allergies:   Allergies   Allergen Reactions   • Glipizide      Slurred speech     • Phenergan [Promethazine Hcl]      Burning veins   • Risperdal [Risperidone]      Slurred speech   • Tramadol      seizures       Medications: Scheduled Meds:  amLODIPine 10 mg Oral Nightly   aspirin 243 mg Oral Daily   atorvastatin 20 mg Oral Daily   clindamycin 300 mg Oral Q8H   clopidogrel 75 mg Oral Daily   famotidine 40 mg Oral Daily   furosemide 40 mg Oral Daily   isosorbide mononitrate 120 mg Oral QAM   lisinopril 40 mg Oral QAM   metFORMIN 1,000 mg Oral BID With Meals   metoprolol succinate   mg Oral Nightly   nitroglycerin 1 inch Topical Q6H   pantoprazole 40 mg Oral Nightly   pramipexole 1 mg Oral Nightly   ranolazine 1,000 mg Oral Q12H   rivaroxaban 20 mg Oral Daily With Dinner   sertraline 25 mg Oral Nightly   traZODone 300 mg Oral Nightly     Continuous Infusions:   PRN Meds:.•  albuterol  •  LORazepam  •  Morphine  •  ondansetron  •  sodium chloride  •  sodium chloride    Review of Systems:  Review of Systems   Constitutional: Positive for fatigue. Negative for appetite change.   HENT: Negative for sore throat, trouble swallowing and voice change.    Eyes: Negative for photophobia.   Respiratory: Positive for chest tightness and shortness of breath. Negative for apnea, cough, choking, wheezing and stridor.    Cardiovascular: Positive for chest pain and leg swelling. Negative for palpitations.   Gastrointestinal: Negative for abdominal distention, abdominal pain, blood in stool, nausea and vomiting.   Genitourinary: Negative for hematuria and urgency.   Musculoskeletal: Negative for back pain and neck pain.   Neurological: Negative for dizziness, syncope and light-headedness.   Psychiatric/Behavioral: The patient is nervous/anxious.       Otherwise complete ROS is negative except as mentioned above.    Physical Exam:   Temp:  [97.4 °F (36.3 °C)-97.8 °F (36.6 °C)] 97.4 °F (36.3 °C)  Heart Rate:  [76-88] 84  Resp:  [24-28] 24  BP: (149-199)/(74-90) 152/80  Physical Exam   Constitutional: He appears well-nourished. No distress.   HENT:   Head: Normocephalic.   Eyes: Pupils are equal, round, and reactive to light.   Neck: Neck supple. No JVD present. No tracheal deviation present.   Cardiovascular: Normal rate.  Exam reveals no friction rub.    No murmur heard.  Pulmonary/Chest: Effort normal and breath sounds normal. No stridor.   Abdominal: Soft. There is no tenderness. There is no rebound and no guarding.   Musculoskeletal: Normal range of motion.   Lymphadenopathy:     He has no cervical  adenopathy.   Neurological: He is alert. No cranial nerve deficit.   Skin: Skin is warm. He is not diaphoretic.         Results Reviewed:  I have personally reviewed current lab, radiology, and data and agree with results.  Lab Results (last 24 hours)     Procedure Component Value Units Date/Time    CBC & Differential [[id number]] Collected:  06/03/17 1951    Specimen:  Blood Updated:  06/03/17 2001    Narrative:       The following orders were created for panel order CBC & Differential.  Procedure                               Abnormality         Status                     ---------                               -----------         ------                     CBC Auto Differential[[id number]]        Abnormal            Final result                 Please view results for these tests on the individual orders.    CBC Auto Differential [[id number]]  (Abnormal) Collected:  06/03/17 1951    Specimen:  Blood Updated:  06/03/17 2001     WBC 7.37 10*3/mm3      RBC 4.37 10*6/mm3      Hemoglobin 11.9 (L) g/dL      Hematocrit 36.7 (L) %      MCV 84.0 fL      MCH 27.2 pg      MCHC 32.4 g/dL      RDW 14.6 (H) %      RDW-SD 44.4 (H) fl      MPV 9.1 fL      Platelets 157 10*3/mm3      Neutrophil % 69.7 %      Lymphocyte % 16.8 %      Monocyte % 8.7 %      Eosinophil % 3.9 %      Basophil % 0.1 %      Immature Grans % 0.8 (H) %      Neutrophils, Absolute 5.13 10*3/mm3      Lymphocytes, Absolute 1.24 10*3/mm3      Monocytes, Absolute 0.64 10*3/mm3      Eosinophils, Absolute 0.29 10*3/mm3      Basophils, Absolute 0.01 10*3/mm3      Immature Grans, Absolute 0.06 (H) 10*3/mm3     CK [[id number]]  (Abnormal) Collected:  06/03/17 1951    Specimen:  Blood Updated:  06/03/17 2012     Creatine Kinase 229 (H) U/L     Magnesium [[id number]]  (Normal) Collected:  06/03/17 1951    Specimen:  Blood Updated:  06/03/17 2012     Magnesium 1.9 mg/dL     Comprehensive Metabolic Panel [[id number]]  (Abnormal) Collected:  06/03/17 1951    Specimen:  Blood  Updated:  06/03/17 2014     Glucose 268 (H) mg/dL      BUN 3 (L) mg/dL      Creatinine 0.67 (L) mg/dL      Sodium 137 mmol/L      Potassium 3.6 mmol/L      Chloride 102 mmol/L      CO2 24.0 mmol/L      Calcium 8.3 (L) mg/dL      Total Protein 7.0 g/dL      Albumin 3.50 g/dL      ALT (SGPT) 47 U/L      AST (SGOT) 22 U/L      Alkaline Phosphatase 62 U/L      Total Bilirubin 0.3 mg/dL      eGFR Non African Amer 133 mL/min/1.73      Globulin 3.5 gm/dL      A/G Ratio 1.0 (L) g/dL      BUN/Creatinine Ratio 4.5 (L)     Anion Gap 11.0 mmol/L     Protime-INR [[id number]]  (Normal) Collected:  06/03/17 1951    Specimen:  Blood Updated:  06/03/17 2021     Protime 12.3 Seconds      INR 0.92    Narrative:       Therapeutic range for most indications is 2.0-3.0 INR,  or 2.5-3.5 for mechanical heart valves.    Troponin [[id number]]  (Normal) Collected:  06/03/17 1951    Specimen:  Blood Updated:  06/03/17 2024     Troponin I <0.012 ng/mL     CK-MB [[id number]]  (Normal) Collected:  06/03/17 1951    Specimen:  Blood Updated:  06/03/17 2024     CKMB 1.93 ng/mL     BNP [[id number]]  (Normal) Collected:  06/03/17 1951    Specimen:  Blood Updated:  06/03/17 2025     proBNP 86.9 pg/mL     [place] Draw [[id number]] Collected:  06/03/17 1951    Specimen:  Blood Updated:  06/03/17 2101    Narrative:       The following orders were created for panel order [place] Draw.  Procedure                               Abnormality         Status                     ---------                               -----------         ------                     Light Blue Top[[id number]]                                   Final result               Green Top (Gel)[[id number]]                                  Final result               Lavender Top[[id number]]                                     Final result               Gold Top - SST[[id number]]                                   Final result                 Please view results for these tests on the individual orders.     Light Blue Top [[id number]] Collected:  06/03/17 1951    Specimen:  Blood Updated:  06/03/17 2101     Extra Tube hold for add-on      Auto resulted       Green Top (Gel) [[id number]] Collected:  06/03/17 1951    Specimen:  Blood Updated:  06/03/17 2101     Extra Tube Hold for add-ons.      Auto resulted.       Lavender Top [[id number]] Collected:  06/03/17 1951    Specimen:  Blood Updated:  06/03/17 2101     Extra Tube hold for add-on      Auto resulted       Gold Top - SST [[id number]] Collected:  06/03/17 1951    Specimen:  Blood Updated:  06/03/17 2101     Extra Tube Hold for add-ons.      Auto resulted.       Troponin [[id number]]  (Normal) Collected:  06/03/17 2231    Specimen:  Blood from Hand, Right Updated:  06/03/17 2315     Troponin I <0.012 ng/mL         Imaging Results (last 24 hours)     Procedure Component Value Units Date/Time    XR Chest 1 View [[id number]] Collected:  06/03/17 1900     Updated:  06/03/17 1936    Narrative:         EXAM:         Radiograph(s), Chest   VIEWS:   Portable ; 1       DATE/TIME: 6/3/2017 7:36 PM CDT               INDICATION:     Chest pain      COMPARISON:   CXR: 5/9/17          FINDINGS:           - lines/tubes:     none     - cardiac:          size within normal limits         - mediastinum:  contour within normal limits         - lungs:          no focal air space process, pulmonary  interstitial edema, nodule(s)/mass             - pleura:          no evidence of  fluid                  - osseous:          unremarkable for age                  - misc.:        Impression:       CONCLUSION:        1. Negative examination.                      Electronically signed by:  MIGUEL A Soni MD  6/3/2017 7:36 PM  CDT Workstation: [id number]            Assessment:    Hospital Problem List     Chest pain                Plan: Multiple admissions for recurrent Chest pain. Observe, rule out with cardiac enzymes. Continue current meds. Continue Ativan as anxiety may be contributing  to some of his symptoms.            Continue current anticoagulation. No evidence of bleeding.            Insulin sliding scale prn            Add lasix for mild peripheral edema. Chest xray with no evidence of failure.        Antony Frost DO  06/04/17  12:21 AM

## 2017-06-04 NOTE — ED PROVIDER NOTES
Subjective   HPI Comments: Had stents x2    Echo done on 04/22/2017 showed EF 55%.   Echo done on 03/25/2017 showed EF 50% w diastolic dysfunction.     F/u w Dr. Briceno.    Patient is a 36 y.o. male presenting with chest pain.   History provided by:  Patient  Chest Pain   Pain location:  Substernal area  Pain quality: pressure    Pain radiates to:  Does not radiate  Pain severity:  Severe  Onset quality:  Gradual  Duration:  1 day  Timing:  Constant  Progression:  Unchanged  Chronicity:  New  Relieved by:  Nothing  Worsened by:  Exertion  Ineffective treatments:  Nitroglycerin (Took 2 at home)  Associated symptoms: nausea and shortness of breath    Associated symptoms: no cough    Nausea:     Severity:  Mild    Onset quality:  Gradual    Duration:  1 day    Timing:  Intermittent    Progression:  Unchanged  Shortness of breath:     Severity:  Severe    Onset quality:  Gradual    Duration:  1 day    Timing:  Constant  Risk factors: diabetes mellitus, hypertension, male sex, obesity and prior DVT/PE (PE x 3 times)    Risk factors comment:  Asthma      Review of Systems   Constitutional: Negative.    Respiratory: Positive for shortness of breath. Negative for cough.         Asthma   Cardiovascular: Positive for chest pain and leg swelling (left worse than right one).   Gastrointestinal: Positive for nausea.   Genitourinary: Negative.    Musculoskeletal: Negative.    Neurological: Negative.    Psychiatric/Behavioral: The patient is nervous/anxious.    All other systems reviewed and are negative.      Past Medical History:   Diagnosis Date   • Allergic rhinitis    • Asthma    • Chest pain    • Chronic back pain    • Chronic pain    • Diabetes mellitus    • GERD (gastroesophageal reflux disease)    • Hyperlipidemia    • Hypertension    • Low back pain    • Morbid obesity    • Pulmonary embolism    • RLS (restless legs syndrome)    • Seizures    • Sleep apnea        Allergies   Allergen Reactions   • Glipizide      Slurred  speech     • Phenergan [Promethazine Hcl]      Burning veins   • Risperdal [Risperidone]      Slurred speech   • Tramadol      seizures       Past Surgical History:   Procedure Laterality Date   • CARDIAC CATHETERIZATION N/A 3/15/2017    Procedure: Left Heart Cath;  Surgeon: Edwige Briceno MD;  Location: [facility] CATH INVASIVE LOCATION;  Service:    • CARDIAC CATHETERIZATION N/A 3/15/2017    Procedure: Stent SOPHIA coronary;  Surgeon: Edwige Briceno MD;  Location: [facility] CATH INVASIVE LOCATION;  Service:    • LA RT/LT HEART CATHETERS N/A 3/15/2017    Procedure: Percutaneous Coronary Intervention;  Surgeon: Edwige Briceno MD;  Location: [facility] CATH INVASIVE LOCATION;  Service: Cardiovascular   • TRANSESOPHAGEAL ECHOCARDIOGRAM (MONICA)         Family History   Problem Relation Age of Onset   • Cancer Other    • Coronary artery disease Other    • Diabetes Other    • Heart disease Other    • Hypertension Other        Social History     Social History   • Marital status:      Spouse name: Cori   • Number of children: 0   • Years of education: N/A     Social History Main Topics   • Smoking status: Former Smoker   • Smokeless tobacco: Current User     Types: Snuff   • Alcohol use No   • Drug use: No   • Sexual activity: Defer     Other Topics Concern   • None     Social History Narrative           Objective   Physical Exam   Constitutional: He is oriented to person, place, and time. He appears distressed.   Cardiovascular: Normal rate, regular rhythm, normal heart sounds and intact distal pulses.        Pulmonary/Chest: He is in respiratory distress. He has wheezes.   Tachypnea,  98% on 6L, turned down to 2L w O2 sat 95% on 2L   Abdominal: Soft. Bowel sounds are normal.   Musculoskeletal: He exhibits edema (BLE (left worse than right)). He exhibits no tenderness.   Neurological: He is alert and oriented to person, place, and time.   Skin: Skin is warm and dry.   Psychiatric: His speech is normal and behavior is normal. Judgment  normal. His mood appears anxious.   Nursing note and vitals reviewed.      ECG 12 Lead    Date/Time: 6/3/2017 6:07 PM  Performed by: DEYSI MARTIN  Authorized by: LORENE GRIFFIN   Interpreted by physician  Comparison: compared with previous ECG from 5/10/2017  Rhythm: sinus rhythm  Clinical impression: abnormal ECG               ED Course  ED Course   Comment By Time   Tevin #[id number] Deysi Martin [facility] 06/03 1909   Still c/o chest pain, order additional morphine. Deysi Martin [facility] 06/03 2011   SOB and BP improved after receiving lasix and nitro. Pt stated he was very anxious. Ativan would be ordered. Deysi Martin [facility] 06/03 2131      Labs Reviewed   COMPREHENSIVE METABOLIC PANEL - Abnormal; Notable for the following:        Result Value    Glucose 268 (*)     BUN 3 (*)     Creatinine 0.67 (*)     Calcium 8.3 (*)     A/G Ratio 1.0 (*)     BUN/Creatinine Ratio 4.5 (*)     All other components within normal limits   CBC WITH AUTO DIFFERENTIAL - Abnormal; Notable for the following:     Hemoglobin 11.9 (*)     Hematocrit 36.7 (*)     RDW 14.6 (*)     RDW-SD 44.4 (*)     Immature Grans % 0.8 (*)     Immature Grans, Absolute 0.06 (*)     All other components within normal limits   CK - Abnormal; Notable for the following:     Creatine Kinase 229 (*)     All other components within normal limits   TROPONIN (IN-HOUSE) - Normal   BNP (IN-HOUSE) - Normal   PROTIME-INR - Normal    Narrative:     Therapeutic range for most indications is 2.0-3.0 INR,  or 2.5-3.5 for mechanical heart valves.   CK MB - Normal   MAGNESIUM - Normal   RAINBOW DRAW    Narrative:     The following orders were created for panel order [place] Draw.  Procedure                               Abnormality         Status                     ---------                               -----------         ------                     Light Blue Top[[id number]]                                   Final result               Green Top (Gel)[[id number]]                                   Final result               Lavender Top[[id number]]                                     Final result               Gold Top - SST[[id number]]                                   Final result                 Please view results for these tests on the individual orders.   TROPONIN (IN-HOUSE)   CBC AND DIFFERENTIAL    Narrative:     The following orders were created for panel order CBC & Differential.  Procedure                               Abnormality         Status                     ---------                               -----------         ------                     CBC Auto Differential[[id number]]        Abnormal            Final result                 Please view results for these tests on the individual orders.   LIGHT BLUE TOP   GREEN TOP   LAVENDER TOP   GOLD TOP - SST       XR Chest 1 View   Final Result   CONCLUSION:          1. Negative examination.                           Electronically signed by:  MIGUEL A Soni MD  6/3/2017 7:36 PM   CDT Workstation: [id number]                    MDM  Number of Diagnoses or Management Options  Acute on chronic diastolic congestive heart failure: new and requires workup  Chest pain, unspecified type: new and requires workup  SOB (shortness of breath): new and requires workup     Amount and/or Complexity of Data Reviewed  Clinical lab tests: reviewed  Tests in the radiology section of CPT®: reviewed  Discuss the patient with other providers: yes    Risk of Complications, Morbidity, and/or Mortality  Presenting problems: high  Diagnostic procedures: high  Management options: high    Critical Care  Total time providing critical care: 30-74 minutes    Patient Progress  Patient progress: improved      Final diagnoses:   Chest pain, unspecified type   SOB (shortness of breath)   Acute on chronic diastolic congestive heart failure            BANG Amezquita  06/03/17 2131

## 2017-06-04 NOTE — PLAN OF CARE
Problem: Patient Care Overview (Adult)  Goal: Plan of Care Review  Outcome: Ongoing (interventions implemented as appropriate)    06/04/17 0408   Coping/Psychosocial Response Interventions   Plan Of Care Reviewed With patient   Patient Care Overview   Progress no change   Outcome Evaluation   Outcome Summary/Follow up Plan pt. has been very agitated this shift. Ativan given x 1. pt. states does not feel well.        Goal: Adult Individualization and Mutuality  Outcome: Ongoing (interventions implemented as appropriate)  Goal: Discharge Needs Assessment  Outcome: Ongoing (interventions implemented as appropriate)    Problem: Respiratory Insufficiency (Adult)  Goal: Identify Related Risk Factors and Signs and Symptoms  Outcome: Ongoing (interventions implemented as appropriate)  Goal: Acid/Base Balance  Outcome: Ongoing (interventions implemented as appropriate)  Goal: Effective Ventilation  Outcome: Ongoing (interventions implemented as appropriate)

## 2017-06-04 NOTE — PLAN OF CARE
Problem: Patient Care Overview (Adult)  Goal: Plan of Care Review  Outcome: Outcome(s) achieved Date Met:  06/04/17  Goal: Adult Individualization and Mutuality  Outcome: Outcome(s) achieved Date Met:  06/04/17  Goal: Discharge Needs Assessment  Outcome: Outcome(s) achieved Date Met:  06/04/17    Problem: Respiratory Insufficiency (Adult)  Goal: Identify Related Risk Factors and Signs and Symptoms  Outcome: Outcome(s) achieved Date Met:  06/04/17  Goal: Acid/Base Balance  Outcome: Outcome(s) achieved Date Met:  06/04/17  Goal: Effective Ventilation  Outcome: Outcome(s) achieved Date Met:  06/04/17

## 2017-06-04 NOTE — DISCHARGE SUMMARY
[facility] Medicine Services  DISCHARGE SUMMARY       Date of Admission: 6/3/2017  Date of Discharge:  6/4/2017  Primary Care Physician: Edwige Briceno MD    Presenting Problem/History of Present Illness:  SOB (shortness of breath) [R06.02]  Acute on chronic diastolic congestive heart failure [I50.33]  Chest pain, unspecified type [R07.9]       Final Discharge Diagnoses:  Hospital Problem List     Chest pain        Pertinent Test Results:     Lab Results (last 24 hours)     Procedure Component Value Units Date/Time    CBC & Differential [[id number]] Collected:  06/03/17 1951    Specimen:  Blood Updated:  06/03/17 2001    Narrative:       The following orders were created for panel order CBC & Differential.  Procedure                               Abnormality         Status                     ---------                               -----------         ------                     CBC Auto Differential[[id number]]        Abnormal            Final result                 Please view results for these tests on the individual orders.    CBC Auto Differential [[id number]]  (Abnormal) Collected:  06/03/17 1951    Specimen:  Blood Updated:  06/03/17 2001     WBC 7.37 10*3/mm3      RBC 4.37 10*6/mm3      Hemoglobin 11.9 (L) g/dL      Hematocrit 36.7 (L) %      MCV 84.0 fL      MCH 27.2 pg      MCHC 32.4 g/dL      RDW 14.6 (H) %      RDW-SD 44.4 (H) fl      MPV 9.1 fL      Platelets 157 10*3/mm3      Neutrophil % 69.7 %      Lymphocyte % 16.8 %      Monocyte % 8.7 %      Eosinophil % 3.9 %      Basophil % 0.1 %      Immature Grans % 0.8 (H) %      Neutrophils, Absolute 5.13 10*3/mm3      Lymphocytes, Absolute 1.24 10*3/mm3      Monocytes, Absolute 0.64 10*3/mm3      Eosinophils, Absolute 0.29 10*3/mm3      Basophils, Absolute 0.01 10*3/mm3      Immature Grans, Absolute 0.06 (H) 10*3/mm3     CK [[id number]]  (Abnormal) Collected:  06/03/17 1951    Specimen:  Blood Updated:  06/03/17 2012      Creatine Kinase 229 (H) U/L     Magnesium [[id number]]  (Normal) Collected:  06/03/17 1951    Specimen:  Blood Updated:  06/03/17 2012     Magnesium 1.9 mg/dL     Comprehensive Metabolic Panel [[id number]]  (Abnormal) Collected:  06/03/17 1951    Specimen:  Blood Updated:  06/03/17 2014     Glucose 268 (H) mg/dL      BUN 3 (L) mg/dL      Creatinine 0.67 (L) mg/dL      Sodium 137 mmol/L      Potassium 3.6 mmol/L      Chloride 102 mmol/L      CO2 24.0 mmol/L      Calcium 8.3 (L) mg/dL      Total Protein 7.0 g/dL      Albumin 3.50 g/dL      ALT (SGPT) 47 U/L      AST (SGOT) 22 U/L      Alkaline Phosphatase 62 U/L      Total Bilirubin 0.3 mg/dL      eGFR Non African Amer 133 mL/min/1.73      Globulin 3.5 gm/dL      A/G Ratio 1.0 (L) g/dL      BUN/Creatinine Ratio 4.5 (L)     Anion Gap 11.0 mmol/L     Protime-INR [[id number]]  (Normal) Collected:  06/03/17 1951    Specimen:  Blood Updated:  06/03/17 2021     Protime 12.3 Seconds      INR 0.92    Narrative:       Therapeutic range for most indications is 2.0-3.0 INR,  or 2.5-3.5 for mechanical heart valves.    Troponin [[id number]]  (Normal) Collected:  06/03/17 1951    Specimen:  Blood Updated:  06/03/17 2024     Troponin I <0.012 ng/mL     CK-MB [[id number]]  (Normal) Collected:  06/03/17 1951    Specimen:  Blood Updated:  06/03/17 2024     CKMB 1.93 ng/mL     BNP [[id number]]  (Normal) Collected:  06/03/17 1951    Specimen:  Blood Updated:  06/03/17 2025     proBNP 86.9 pg/mL     [facility] Draw [[id number]] Collected:  06/03/17 1951    Specimen:  Blood Updated:  06/03/17 2101    Narrative:       The following orders were created for panel order [facility] Draw.  Procedure                               Abnormality         Status                     ---------                               -----------         ------                     Light Blue Top[[id number]]                                   Final result               Green Top (Gel)[[id number]]                                   Final result               Lavender Top[[id number]]                                     Final result               Gold Top - SST[[id number]]                                   Final result                 Please view results for these tests on the individual orders.    Light Blue Top [[id number]] Collected:  06/03/17 1951    Specimen:  Blood Updated:  06/03/17 2101     Extra Tube hold for add-on      Auto resulted       Green Top (Gel) [[id number]] Collected:  06/03/17 1951    Specimen:  Blood Updated:  06/03/17 2101     Extra Tube Hold for add-ons.      Auto resulted.       Lavender Top [[id number]] Collected:  06/03/17 1951    Specimen:  Blood Updated:  06/03/17 2101     Extra Tube hold for add-on      Auto resulted       Gold Top - SST [[id number]] Collected:  06/03/17 1951    Specimen:  Blood Updated:  06/03/17 2101     Extra Tube Hold for add-ons.      Auto resulted.       Troponin [[id number]]  (Normal) Collected:  06/03/17 2231    Specimen:  Blood from Hand, Right Updated:  06/03/17 2315     Troponin I <0.012 ng/mL     Basic Metabolic Panel [[id number]]  (Abnormal) Collected:  06/04/17 0058    Specimen:  Blood Updated:  06/04/17 0139     Glucose 143 (H) mg/dL      BUN 3 (L) mg/dL      Creatinine 0.67 (L) mg/dL      Sodium 137 mmol/L      Potassium 3.6 mmol/L      Chloride 102 mmol/L      CO2 26.0 mmol/L      Calcium 8.6 mg/dL      eGFR Non African Amer 133 mL/min/1.73      BUN/Creatinine Ratio 4.5 (L)     Anion Gap 9.0 mmol/L     Magnesium [[id number]]  (Normal) Collected:  06/04/17 0058    Specimen:  Blood Updated:  06/04/17 0139     Magnesium 1.8 mg/dL     CBC & Differential [[id number]] Collected:  06/04/17 0058    Specimen:  Blood Updated:  06/04/17 0146    Narrative:       The following orders were created for panel order CBC & Differential.  Procedure                               Abnormality         Status                     ---------                               -----------         ------                      CBC Auto Differential[[id number]]        Abnormal            Final result                 Please view results for these tests on the individual orders.    CBC Auto Differential [[id number]]  (Abnormal) Collected:  06/04/17 0058    Specimen:  Blood Updated:  06/04/17 0146     WBC 7.90 10*3/mm3      RBC 4.33 (L) 10*6/mm3      Hemoglobin 11.9 (L) g/dL      Hematocrit 36.0 (L) %      MCV 83.1 fL      MCH 27.5 pg      MCHC 33.1 g/dL      RDW 14.6 (H) %      RDW-SD 44.1 (H) fl      MPV 9.3 fL      Platelets 170 10*3/mm3      Neutrophil % 64.1 %      Lymphocyte % 21.6 %      Monocyte % 8.5 %      Eosinophil % 4.6 %      Basophil % 0.1 %      Immature Grans % 1.1 (H) %      Neutrophils, Absolute 5.06 10*3/mm3      Lymphocytes, Absolute 1.71 10*3/mm3      Monocytes, Absolute 0.67 10*3/mm3      Eosinophils, Absolute 0.36 10*3/mm3      Basophils, Absolute 0.01 10*3/mm3      Immature Grans, Absolute 0.09 (H) 10*3/mm3     POC Glucose Fingerstick [[id number]]  (Abnormal) Collected:  06/04/17 0557    Specimen:  Blood Updated:  06/04/17 0610     Glucose 151 (H) mg/dL       RN NotifiedMeter: [id number]: [id number] PERRY JACKSON       CBC Auto Differential [[id number]]  (Abnormal) Collected:  06/04/17 0533    Specimen:  Blood Updated:  06/04/17 0619     WBC 8.75 10*3/mm3      RBC 4.50 10*6/mm3      Hemoglobin 12.5 (L) g/dL      Hematocrit 37.3 (L) %      MCV 82.9 fL      MCH 27.8 pg      MCHC 33.5 g/dL      RDW 14.9 (H) %      RDW-SD 45.3 (H) fl      MPV 9.1 fL      Platelets 199 10*3/mm3      Neutrophil % 64.8 %      Lymphocyte % 20.1 %      Monocyte % 9.0 %      Eosinophil % 4.7 %      Basophil % 0.3 %      Immature Grans % 1.1 (H) %      Neutrophils, Absolute 5.66 10*3/mm3      Lymphocytes, Absolute 1.76 10*3/mm3      Monocytes, Absolute 0.79 10*3/mm3      Eosinophils, Absolute 0.41 10*3/mm3      Basophils, Absolute 0.03 10*3/mm3      Immature Grans, Absolute 0.10 (H) 10*3/mm3     CBC & Differential [[id number]] Collected:   06/04/17 0533    Specimen:  Blood Updated:  06/04/17 0620    Narrative:       The following orders were created for panel order CBC & Differential.  Procedure                               Abnormality         Status                     ---------                               -----------         ------                     Scan Slide[[id number]]                                                                  CBC Auto Differential[[id number]]        Abnormal            Final result                 Please view results for these tests on the individual orders.    Basic Metabolic Panel [[id number]]  (Abnormal) Collected:  06/04/17 0533    Specimen:  Blood Updated:  06/04/17 0635     Glucose 146 (H) mg/dL      BUN 4 (L) mg/dL      Creatinine 0.71 mg/dL      Sodium 136 (L) mmol/L      Potassium 3.9 mmol/L      Chloride 100 mmol/L      CO2 28.0 mmol/L      Calcium 8.5 mg/dL      eGFR Non African Amer 124 mL/min/1.73      BUN/Creatinine Ratio 5.6 (L)     Anion Gap 8.0 mmol/L     Magnesium [[id number]]  (Normal) Collected:  06/04/17 0533    Specimen:  Blood Updated:  06/04/17 0635     Magnesium 1.9 mg/dL     Troponin [[id number]]  (Normal) Collected:  06/04/17 0533    Specimen:  Blood Updated:  06/04/17 0635     Troponin I <0.012 ng/mL         Hospital Course:  The patient is a 36 y.o. male who presented to [facility] with Chest pain.  Patient had cardiac catheterization in March 2017 which demonstrated restenosis of cardiac stent.  80% blockage was reduced to 0 at that time.  Patient also has known pulmonary embolism and is on a therapeutic dose of the xarelto, being followed by anticoagulation clinic.  Patient has had multiple pulmonary angiograms demonstrating no significant change recently.  Chest x-ray within normal limits with no acute disease.  All cardiac enzymes negative and patient oxygenating well at 95%.  No acute changes on EKG.  Patient was counseled on diagnosis was also counseled on nutrition and  "weight loss given his morbid obesity.  Patient was also counseled on his anxiety.  Patient will be referred back to his primary cardiologist as well as his primary care provider.  At time of discharge patient was in no distress.  He had tolerated by mouth intake.  He was playing games on his tablet.    Condition on Discharge:  Stable    Physical Exam on Discharge:  /75 (BP Location: Left arm, Patient Position: Sitting)  Pulse 99  Temp 97.7 °F (36.5 °C) (Temporal Artery )   Resp 20  Ht 71\" (180.3 cm)  Wt (!) 468 lb 8 oz (213 kg)  SpO2 95%  BMI 65.34 kg/m2  Physical Exam   Constitutional: He is oriented to person, place, and time. He appears well-developed and well-nourished.  Non-toxic appearance. He does not have a sickly appearance. He does not appear ill. No distress.   HENT:   Head: Normocephalic and atraumatic.   Eyes: Conjunctivae and EOM are normal. Pupils are equal, round, and reactive to light. Right eye exhibits no discharge and no exudate. Left eye exhibits no discharge and no exudate. No scleral icterus.   Neck: Normal range of motion. Neck supple.   Cardiovascular: Normal rate, regular rhythm, normal heart sounds and intact distal pulses.  Exam reveals no gallop and no friction rub.    No murmur heard.  Pulmonary/Chest: Effort normal and breath sounds normal. No accessory muscle usage. No apnea, no tachypnea and no bradypnea. No respiratory distress. He has no wheezes. He has no rales. He exhibits no tenderness.   Abdominal: Soft. Bowel sounds are normal. He exhibits no distension. There is no tenderness. There is no rebound and no guarding.   Neurological: He is alert and oriented to person, place, and time.   Skin: Skin is warm and dry. No abrasion, no bruising, no ecchymosis, no laceration, no lesion and no petechiae noted. He is not diaphoretic.   Psychiatric: He has a normal mood and affect. His behavior is normal. His mood appears not anxious. His affect is not inappropriate. "     Discharge Disposition:  Home or Self Care    Discharge Medications:   Yordy Hansen   Home Medication Instructions ROCKY:[id number]    Printed on:06/04/17 3896   Medication Information                      albuterol (PROVENTIL HFA;VENTOLIN HFA) 108 (90 BASE) MCG/ACT inhaler  Inhale 2 puffs as needed for wheezing.             amLODIPine (NORVASC) 10 MG tablet  Take 1 tablet by mouth Every Night.             Canagliflozin (INVOKANA) 100 MG tablet  Take 1 tablet by mouth Every Night.             clindamycin (CLEOCIN) 300 MG capsule  Take 1 capsule by mouth 3 (Three) Times a Day for 7 days.             clopidogrel (PLAVIX) 75 MG tablet  Take 1 tablet by mouth Daily.             ipratropium-albuterol (DUO-NEB) 0.5-2.5 mg/mL nebulizer  Inhale 3 mL Every 4 (Four) Hours As Needed.             isosorbide mononitrate (IMDUR) 120 MG 24 hr tablet  Take 1 tablet by mouth Every Morning.             Liraglutide (VICTOZA) 18 MG/3ML solution pen-injector  Inject 1.8 mg under the skin Daily.             lisinopril (PRINIVIL,ZESTRIL) 40 MG tablet  Take 1 tablet by mouth Every Morning.             metFORMIN (GLUCOPHAGE) 1000 MG tablet  Take 1 tablet by mouth 2 (Two) Times a Day With Meals.             metoprolol succinate XL (TOPROL-XL) 100 MG 24 hr tablet  Take 1 tablet by mouth Every Night.             MICROLET LANCETS misc  USE TO TEST BLOOD SUGAR EVERY DAY AND AS NEEDED             nitroglycerin (NITROSTAT) 0.4 MG SL tablet  Place 1 tablet under the tongue Every 5 (Five) Minutes As Needed for Chest Pain.             ondansetron ODT (ZOFRAN-ODT) 4 MG disintegrating tablet  Take 1 tablet by mouth Every 6 (Six) Hours As Needed for Nausea or Vomiting.             pantoprazole (PROTONIX) 40 MG EC tablet  Take 1 tablet by mouth Every Night.             pramipexole (MIRAPEX) 1 MG tablet  Take 2 tablets by mouth Every Night. Taking 2mg daily             pravastatin (PRAVACHOL) 80 MG tablet  Take 1 tablet by mouth Every Night.              ranolazine (RANEXA) 1000 MG 12 hr tablet  Take 1 tablet by mouth Every 12 (Twelve) Hours.             rivaroxaban (XARELTO) 20 MG tablet  Take 1 tablet by mouth Daily.             sertraline (ZOLOFT) 25 MG tablet  Take 1 tablet by mouth Daily.             traZODone (DESYREL) 300 MG tablet  Take 1 tablet by mouth Every Night.                 Discharge Diet:   Diet Instructions     Diet: Consistent Carbohydrate, Cardiac; Thin Liquids, No Restrictions       Discharge Diet:   Consistent Carbohydrate  Cardiac      Fluid Consistency:  Thin Liquids, No Restrictions                 Activity at Discharge:   Activity Instructions     Activity as Tolerated                     Discharge Care Plan/Instructions: Return with chest pain, nausea, vomiting, shortness of air, dizziness, syncope, any worsening symptoms.    Follow-up Appointments:   Future Appointments  Date Time Provider Department Center   7/3/2017 1:00 PM LALA Barajas [facility] None   8/17/2017 2:00 PM Edwige Briceno MD [facility] None

## 2017-06-06 ENCOUNTER — TELEPHONE (OUTPATIENT)
Dept: CARDIOLOGY | Facility: CLINIC | Age: 37
End: 2017-06-06

## 2017-06-06 NOTE — TELEPHONE ENCOUNTER
Mr. Hansen called concerning chest pain that he is having. He states he has had stents and  PE in the recent past . He was advised, given his history and the nature of the chest pain, to seek medical attention.

## 2017-06-08 NOTE — PROGRESS NOTES
Subjective   Patient ID: Yordy Hansen is a 36 y.o. male is here today for follow-up for follow up anticoagulation, pulmonary embolus.  PCP MD Ronald Tam MD     History of Present Illness  History of Present Illness: 36 y.o. male with known CAD SP DUE stent X2 to LAD in June 2016 for which he continues on EFFIENT which he has tolerated well, he stopped smoking then. He is morbid obesity. He sttes he is not sedentary, and has no prolonged sitting. He present to ED with his symptoms; CTA pulmonary demonstrated RLL X 2 partial filling defects Echo: EF 50% Mild MR/TR No RV strain noted. Lower extremity Duplex: No DVT. CVTS saw in hospital for anticoagulation recommendations. He was originally on Coumadin therapy and wished to be switched to NOAC. He was changed to Xarelto. Hypercoagulable panel + Factor 2 deficiency. Returns for follow up.       The following portions of the patient's history were reviewed and updated as appropriate: allergies, current medications, past family history, past medical history, past social history, past surgical history and problem list.    Current Outpatient Prescriptions:   •  albuterol (PROVENTIL HFA;VENTOLIN HFA) 108 (90 BASE) MCG/ACT inhaler, Inhale 2 puffs as needed for wheezing., Disp: , Rfl:   •  clopidogrel (PLAVIX) 75 MG tablet, Take 1 tablet by mouth Daily. (Patient taking differently: Take 75 mg by mouth Every Night.), Disp: 30 tablet, Rfl: 1  •  ipratropium-albuterol (DUO-NEB) 0.5-2.5 mg/mL nebulizer, Inhale 3 mL Every 4 (Four) Hours As Needed., Disp: , Rfl:   •  Liraglutide (VICTOZA) 18 MG/3ML solution pen-injector, Inject 1.8 mg under the skin Daily., Disp: , Rfl:   •  MICROLET LANCETS misc, USE TO TEST BLOOD SUGAR EVERY DAY AND AS NEEDED, Disp: , Rfl:   •  amLODIPine (NORVASC) 10 MG tablet, Take 1 tablet by mouth Every Night., Disp: 30 tablet, Rfl: 5  •  Canagliflozin (INVOKANA) 100 MG tablet, Take 1 tablet by mouth Every Night., Disp: 30 tablet, Rfl: 5  •   clindamycin (CLEOCIN) 300 MG capsule, Take 1 capsule by mouth 3 (Three) Times a Day for 7 days., Disp: 21 capsule, Rfl: 0  •  isosorbide mononitrate (IMDUR) 120 MG 24 hr tablet, Take 1 tablet by mouth Every Morning., Disp: 30 tablet, Rfl: 5  •  lisinopril (PRINIVIL,ZESTRIL) 40 MG tablet, Take 1 tablet by mouth Every Morning., Disp: 30 tablet, Rfl: 5  •  metFORMIN (GLUCOPHAGE) 1000 MG tablet, Take 1 tablet by mouth 2 (Two) Times a Day With Meals., Disp: 60 tablet, Rfl: 5  •  metoprolol succinate XL (TOPROL-XL) 100 MG 24 hr tablet, Take 1 tablet by mouth Every Night., Disp: 30 tablet, Rfl: 5  •  nitroglycerin (NITROSTAT) 0.4 MG SL tablet, Place 1 tablet under the tongue Every 5 (Five) Minutes As Needed for Chest Pain., Disp: 25 tablet, Rfl: 6  •  ondansetron ODT (ZOFRAN-ODT) 4 MG disintegrating tablet, Take 1 tablet by mouth Every 6 (Six) Hours As Needed for Nausea or Vomiting., Disp: 12 tablet, Rfl: 0  •  pantoprazole (PROTONIX) 40 MG EC tablet, Take 1 tablet by mouth Every Night., Disp: 30 tablet, Rfl: 5  •  pramipexole (MIRAPEX) 1 MG tablet, Take 2 tablets by mouth Every Night. Taking 2mg daily, Disp: 30 tablet, Rfl: 5  •  pravastatin (PRAVACHOL) 80 MG tablet, Take 1 tablet by mouth Every Night., Disp: 30 tablet, Rfl: 5  •  ranolazine (RANEXA) 1000 MG 12 hr tablet, Take 1 tablet by mouth Every 12 (Twelve) Hours., Disp: 60 tablet, Rfl: 5  •  rivaroxaban (XARELTO) 20 MG tablet, Take 1 tablet by mouth Daily. (Patient taking differently: Take 20 mg by mouth Daily With Dinner.), Disp: 30 tablet, Rfl: 11  •  sertraline (ZOLOFT) 25 MG tablet, Take 1 tablet by mouth Daily. (Patient taking differently: Take 25 mg by mouth Every Night.), Disp: 30 tablet, Rfl: 5  •  traZODone (DESYREL) 300 MG tablet, Take 1 tablet by mouth Every Night., Disp: 30 tablet, Rfl: 5    ROS  Constitutional: Negative for activity change, appetite change, chills, fatigue and fever.   Denies use of power tools, electric knives, chain saws, or conttact  sports. Shaves with safety razor. Aware to use soft tooth brush and waxed floss.  No recent falls.  Denies: GIB, GUB, or Seizures.   HENT: Negative for congestion, nosebleeds, sore throat and voice change.   Eyes: Negative for visual disturbance.   Respiratory. Negative for cough, choking, shortness of breath, wheezing and stridor.   Cardiovascular: Positive for chest pain (Improved ). Negative for palpitations and leg swelling.   Gastrointestinal: Negative for abdominal pain, blood in stool, constipation, diarrhea and nausea.   Genitourinary: Negative for hematuria.   Musculoskeletal: Negative for back pain and joint swelling.   Skin: Negative for color change, pallor and rash.   Allergic/Immunologic: Negative for environmental allergies.   Neurological: Negative for dizziness, seizures, syncope, weakness, light-headedness, numbness and headaches.   Hematological: Does not bruise/bleed easily.   Psychiatric/Behavioral: The patient is not nervous/anxious.         Objective   Physical Exam  Constitutional: He is oriented to person, place, and time. He appears morbidly obese.  HENT:   Head: Normocephalic.   Mouth/Throat: Oropharynx is clear and moist.   Eyes: Conjunctivae are normal. Pupils are equal, round, and reactive to light.   Neck: No JVD present. No tracheal deviation present.   Cardiovascular: Normal rate, regular rhythm, Distant normal heart sounds and intact distal pulses.   Pulses:  Carotid pulses are 1+ on the right side with bruit, and 1+ on the left side.  Radial pulses are 2+ on the right side, and 2+ on the left side.   Dorsalis pedis pulses are 2+ on the right side, and 2+ on the left side.   Posterior tibial pulses are 2+ on the right side, and 2+ on the left side.   Pulmonary/Chest: Effort normal and breath sounds normal. No stridor.   Distant  Abdominal: Soft. Bowel sounds are normal. He exhibits no distension. There is no tenderness.   Musculoskeletal: He exhibits no edema or tenderness.    Neurological: He is alert and oriented to person, place, and time. No cranial nerve deficit.   Skin: Skin is warm and dry. No rash noted. No erythema. No pallor.   Psychiatric: Judgment normal.   Nursing note and vitals reviewed.         Assessment/Plan   Independent Review of Radiographic Studies:    No new studies     1. Pulmonary embolism on long-term anticoagulation therapy  Tolerating Xarelto well. Continue lifelong.   - rivaroxaban (XARELTO) 20 MG tablet; Take 1 tablet by mouth Daily. (Patient taking differently: Take 20 mg by mouth Daily With Dinner.)  Dispense: 30 tablet; Refill: 11    2. Factor II deficiency  See #1  - rivaroxaban (XARELTO) 20 MG tablet; Take 1 tablet by mouth Daily. (Patient taking differently: Take 20 mg by mouth Daily With Dinner.)  Dispense: 30 tablet; Refill: 11    3. Type 2 diabetes mellitus with other circulatory complication  Establish new PCP  - Ambulatory Referral to Family Practice

## 2017-06-27 ENCOUNTER — DOCUMENTATION (OUTPATIENT)
Dept: CARDIOLOGY | Facility: CLINIC | Age: 37
End: 2017-06-27

## 2017-06-27 ENCOUNTER — OFFICE VISIT (OUTPATIENT)
Dept: CARDIOLOGY | Facility: CLINIC | Age: 37
End: 2017-06-27

## 2017-06-27 VITALS
BODY MASS INDEX: 44.1 KG/M2 | OXYGEN SATURATION: 98 % | RESPIRATION RATE: 18 BRPM | HEIGHT: 71 IN | HEART RATE: 77 BPM | WEIGHT: 315 LBS | DIASTOLIC BLOOD PRESSURE: 90 MMHG | SYSTOLIC BLOOD PRESSURE: 168 MMHG

## 2017-06-27 DIAGNOSIS — I25.10 CORONARY ARTERY DISEASE INVOLVING NATIVE CORONARY ARTERY OF NATIVE HEART WITHOUT ANGINA PECTORIS: Primary | ICD-10-CM

## 2017-06-27 DIAGNOSIS — I26.99 OTHER ACUTE PULMONARY EMBOLISM WITHOUT ACUTE COR PULMONALE (HCC): Chronic | ICD-10-CM

## 2017-06-27 DIAGNOSIS — I10 ESSENTIAL HYPERTENSION: ICD-10-CM

## 2017-06-27 DIAGNOSIS — E78.5 HYPERLIPIDEMIA, UNSPECIFIED HYPERLIPIDEMIA TYPE: ICD-10-CM

## 2017-06-27 PROCEDURE — 99213 OFFICE O/P EST LOW 20 MIN: CPT | Performed by: INTERNAL MEDICINE

## 2017-06-27 NOTE — PROGRESS NOTES
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  Patient has no chest pain or chest discomfort.  He has no shortness of breath orthopnea or PND.       Review of Systems   Constitution: Negative. Negative for decreased appetite, diaphoresis, weakness, night sweats, weight gain and weight loss.   HENT: Negative for headaches, hearing loss, nosebleeds and sore throat.    Eyes: Negative.  Negative for blurred vision and photophobia.   Cardiovascular: Negative for chest pain, claudication, dyspnea on exertion, irregular heartbeat, leg swelling, palpitations, paroxysmal nocturnal dyspnea and syncope.   Respiratory: Negative for cough, hemoptysis, shortness of breath and wheezing.    Endocrine: Negative for cold intolerance, heat intolerance, polydipsia, polyphagia and polyuria.   Hematologic/Lymphatic: Negative.    Skin: Negative for color change, dry skin, flushing, itching and rash.   Musculoskeletal: Negative.  Negative for muscle cramps, muscle weakness and myalgias.   Gastrointestinal: Negative for abdominal pain, change in bowel habit, diarrhea, hematemesis, melena, nausea and vomiting.   Genitourinary: Negative for dysuria, frequency and hematuria.   Neurological: Negative for dizziness, focal weakness, light-headedness, loss of balance, numbness and seizures.   Psychiatric/Behavioral: Negative.  Negative for substance abuse, suicidal ideas and thoughts of violence.   Allergic/Immunologic: Negative.        Past Medical History:   Diagnosis Date   • Allergic rhinitis    • Asthma    • Chest pain    • Chronic back pain    • Chronic pain    • Diabetes mellitus    • GERD (gastroesophageal reflux disease)    • Hyperlipidemia    • Hypertension    • Low back pain    • Morbid obesity    • Pulmonary embolism    • RLS (restless legs syndrome)    •  Seizures    • Sleep apnea        Family History   Problem Relation Age of Onset   • Cancer Other    • Coronary artery disease Other    • Diabetes Other    • Heart disease Other    • Hypertension Other        Glipizide; Phenergan [promethazine hcl]; Risperdal [risperidone]; Tramadol; and Reglan [metoclopramide]     reports that he has quit smoking. His smokeless tobacco use includes Snuff. He reports that he does not drink alcohol or use illicit drugs.    Objective     Vital Signs  Heart Rate:  [77] 77  Resp:  [18] 18  BP: (168)/(90) 168/90    Physical Exam   Constitutional: He is oriented to person, place, and time. He appears well-developed and well-nourished.   HENT:   Head: Normocephalic and atraumatic.   Eyes: Conjunctivae and EOM are normal. Pupils are equal, round, and reactive to light.   Neck: Neck supple. No JVD present. Carotid bruit is not present. No tracheal deviation and no edema present.   Cardiovascular: Normal rate, regular rhythm, S1 normal, S2 normal, normal heart sounds and intact distal pulses.  Exam reveals no gallop, no S3, no S4 and no friction rub.    No murmur heard.  Pulmonary/Chest: Effort normal and breath sounds normal. He has no wheezes. He has no rales. He exhibits no tenderness.   Abdominal: Bowel sounds are normal. He exhibits no abdominal bruit and no pulsatile midline mass. There is no rebound and no guarding.   Musculoskeletal: Normal range of motion. He exhibits no edema.   Neurological: He is alert and oriented to person, place, and time.   Skin: Skin is warm and dry.   Psychiatric: He has a normal mood and affect.       Procedures    Assessment/Plan     Patient Active Problem List   Diagnosis   • Hyperlipidemia   • Coronary arteriosclerosis in native artery   • Morbid obesity   • Obstructive sleep apnea syndrome   • Chest pain   • Chronic back pain   • GERD (gastroesophageal reflux disease)   • Hypertension   • RLS (restless legs syndrome)   • Seizures   • Pulmonary embolism    • Type 2 diabetes mellitus   • Coronary artery disease involving native heart   • Pulmonary embolism on long-term anticoagulation therapy   • Essential hypertension   • Factor II deficiency     1. Coronary artery disease involving native coronary artery of native heart without angina pectoris  March 15, 2017: 2.5 x 18 mm Xience Alpine stent     Continue with risk factor modification  Continue with guideline direct medical therapy  Continue with dual antiplatelet treatment    2. Essential hypertension  Blood pressure is well controlled we'll continue with current medications.    3. Hyperlipidemia, unspecified hyperlipidemia type  Continue with current dose of pravastatin  We will obtain fasting lipid panel and liver function test    4. Other acute pulmonary embolism without acute cor pulmonale  Patient will continue with Xarelto.    I discussed the patients findings and my recommendations with patient.          This document has been electronically signed by Edwige Briceno MD on June 27, 2017 3:44 PM     Edwige Briceno MD  06/27/17  3:42 PM

## 2017-06-28 ENCOUNTER — PREP FOR SURGERY (OUTPATIENT)
Dept: OTHER | Facility: HOSPITAL | Age: 37
End: 2017-06-28

## 2017-07-11 LAB
ALBUMIN SERPL-MCNC: 3.7 G/DL (ref 3.2–5.5)
ALBUMIN/GLOB SERPL: 0.9 G/DL (ref 1–3)
ALP SERPL-CCNC: 70 U/L (ref 15–121)
ALT SERPL W P-5'-P-CCNC: 35 U/L (ref 10–60)
ANION GAP SERPL CALCULATED.3IONS-SCNC: 10 MMOL/L (ref 5–15)
ANISOCYTOSIS BLD QL: NORMAL
AST SERPL-CCNC: 33 U/L (ref 10–60)
BILIRUB SERPL-MCNC: 0.5 MG/DL (ref 0.2–1)
BUN BLD-MCNC: 10 MG/DL (ref 8–25)
BUN/CREAT SERPL: 14.3 (ref 7–25)
CALCIUM SPEC-SCNC: 9.2 MG/DL (ref 8.4–10.8)
CHLORIDE SERPL-SCNC: 100 MMOL/L (ref 100–112)
CHOLEST SERPL-MCNC: 192 MG/DL (ref 150–200)
CO2 SERPL-SCNC: 25 MMOL/L (ref 20–32)
CREAT BLD-MCNC: 0.7 MG/DL (ref 0.4–1.3)
DEPRECATED RDW RBC AUTO: 44.9 FL (ref 35.1–43.9)
EOSINOPHIL # BLD MANUAL: 0.44 10*3/MM3 (ref 0–0.7)
EOSINOPHIL NFR BLD MANUAL: 5 % (ref 0–7)
ERYTHROCYTE [DISTWIDTH] IN BLOOD BY AUTOMATED COUNT: 14.8 % (ref 11.5–14.5)
GFR SERPL CREATININE-BSD FRML MDRD: 126 ML/MIN/1.73 (ref 70–162)
GLOBULIN UR ELPH-MCNC: 4.2 GM/DL (ref 2.5–4.6)
GLUCOSE BLD-MCNC: 168 MG/DL (ref 70–100)
HCT VFR BLD AUTO: 44 % (ref 39–49)
HDLC SERPL-MCNC: 38 MG/DL (ref 35–100)
HGB BLD-MCNC: 14.4 G/DL (ref 13.7–17.3)
LDLC SERPL CALC-MCNC: 105 MG/DL
LDLC/HDLC SERPL: 2.76 {RATIO}
LYMPHOCYTES # BLD MANUAL: 1.69 10*3/MM3 (ref 0.6–4.2)
LYMPHOCYTES NFR BLD MANUAL: 19 % (ref 10–50)
LYMPHOCYTES NFR BLD MANUAL: 9 % (ref 0–12)
MCH RBC QN AUTO: 27.4 PG (ref 26.5–34)
MCHC RBC AUTO-ENTMCNC: 32.7 G/DL (ref 31.5–36.3)
MCV RBC AUTO: 83.8 FL (ref 80–98)
MONOCYTES # BLD AUTO: 0.8 10*3/MM3 (ref 0–0.9)
NEUTROPHILS # BLD AUTO: 5.96 10*3/MM3 (ref 2–8.6)
NEUTROPHILS NFR BLD MANUAL: 67 % (ref 37–80)
PLATELET # BLD AUTO: 222 10*3/MM3 (ref 150–450)
PMV BLD AUTO: 9.3 FL (ref 8–12)
POTASSIUM BLD-SCNC: 4.3 MMOL/L (ref 3.4–5.4)
PROT SERPL-MCNC: 7.9 G/DL (ref 6.7–8.2)
RBC # BLD AUTO: 5.25 10*6/MM3 (ref 4.37–5.74)
SMALL PLATELETS BLD QL SMEAR: ADEQUATE
SODIUM BLD-SCNC: 135 MMOL/L (ref 134–146)
TRIGL SERPL-MCNC: 246 MG/DL (ref 35–160)
VLDLC SERPL-MCNC: 49.2 MG/DL
WBC MORPH BLD: NORMAL
WBC NRBC COR # BLD: 8.89 10*3/MM3 (ref 3.2–9.8)

## 2017-07-11 PROCEDURE — 84439 ASSAY OF FREE THYROXINE: CPT | Performed by: NURSE PRACTITIONER

## 2017-07-11 PROCEDURE — 84443 ASSAY THYROID STIM HORMONE: CPT | Performed by: NURSE PRACTITIONER

## 2017-07-11 PROCEDURE — 80053 COMPREHEN METABOLIC PANEL: CPT | Performed by: NURSE PRACTITIONER

## 2017-07-11 PROCEDURE — 82607 VITAMIN B-12: CPT | Performed by: NURSE PRACTITIONER

## 2017-07-11 PROCEDURE — 82570 ASSAY OF URINE CREATININE: CPT | Performed by: NURSE PRACTITIONER

## 2017-07-11 PROCEDURE — 82043 UR ALBUMIN QUANTITATIVE: CPT | Performed by: NURSE PRACTITIONER

## 2017-07-11 PROCEDURE — 83036 HEMOGLOBIN GLYCOSYLATED A1C: CPT | Performed by: NURSE PRACTITIONER

## 2017-07-11 PROCEDURE — 85025 COMPLETE CBC W/AUTO DIFF WBC: CPT | Performed by: NURSE PRACTITIONER

## 2017-07-11 PROCEDURE — 80061 LIPID PANEL: CPT | Performed by: NURSE PRACTITIONER

## 2017-07-12 LAB
ALBUMIN UR-MCNC: 34.8 MG/L
CREAT UR-MCNC: 59.2 MG/DL
HBA1C MFR BLD: 7.93 % (ref 4–5.6)
MICROALBUMIN/CREAT UR: 587.8 MG/G (ref 0–30)
T4 FREE SERPL-MCNC: 1.34 NG/DL (ref 0.78–2.19)
TSH SERPL DL<=0.05 MIU/L-ACNC: 2.54 MIU/ML (ref 0.46–4.68)
VIT B12 BLD-MCNC: 354 PG/ML (ref 239–931)

## 2017-07-16 ENCOUNTER — APPOINTMENT (OUTPATIENT)
Dept: GENERAL RADIOLOGY | Facility: HOSPITAL | Age: 37
End: 2017-07-16

## 2017-07-16 ENCOUNTER — HOSPITAL ENCOUNTER (EMERGENCY)
Facility: HOSPITAL | Age: 37
Discharge: HOME | End: 2017-07-16
Attending: FAMILY MEDICINE | Admitting: NURSE PRACTITIONER

## 2017-07-16 VITALS
SYSTOLIC BLOOD PRESSURE: 154 MMHG | HEIGHT: 71 IN | OXYGEN SATURATION: 96 % | WEIGHT: 315 LBS | BODY MASS INDEX: 44.1 KG/M2 | TEMPERATURE: 98.3 F | HEART RATE: 70 BPM | RESPIRATION RATE: 20 BRPM | DIASTOLIC BLOOD PRESSURE: 84 MMHG

## 2017-07-16 DIAGNOSIS — I26.99 PULMONARY EMBOLISM ON LONG-TERM ANTICOAGULATION THERAPY (HCC): ICD-10-CM

## 2017-07-16 DIAGNOSIS — Z79.01 PULMONARY EMBOLISM ON LONG-TERM ANTICOAGULATION THERAPY (HCC): ICD-10-CM

## 2017-07-16 DIAGNOSIS — I25.10 CORONARY ARTERIOSCLEROSIS IN NATIVE ARTERY: Chronic | ICD-10-CM

## 2017-07-16 DIAGNOSIS — I10 ESSENTIAL HYPERTENSION: Primary | ICD-10-CM

## 2017-07-16 LAB
ALBUMIN SERPL-MCNC: 3.6 G/DL (ref 3.4–4.8)
ALBUMIN/GLOB SERPL: 1 G/DL (ref 1.1–1.8)
ALP SERPL-CCNC: 88 U/L (ref 38–126)
ALT SERPL W P-5'-P-CCNC: 42 U/L (ref 21–72)
ANION GAP SERPL CALCULATED.3IONS-SCNC: 7 MMOL/L (ref 5–15)
AST SERPL-CCNC: 28 U/L (ref 17–59)
BASOPHILS # BLD AUTO: 0.01 10*3/MM3 (ref 0–0.2)
BASOPHILS NFR BLD AUTO: 0.1 % (ref 0–2)
BILIRUB SERPL-MCNC: 0.4 MG/DL (ref 0.2–1.3)
BUN BLD-MCNC: 8 MG/DL (ref 7–21)
BUN/CREAT SERPL: 11.6 (ref 7–25)
CALCIUM SPEC-SCNC: 8 MG/DL (ref 8.4–10.2)
CHLORIDE SERPL-SCNC: 100 MMOL/L (ref 95–110)
CO2 SERPL-SCNC: 26 MMOL/L (ref 22–31)
CREAT BLD-MCNC: 0.69 MG/DL (ref 0.7–1.3)
DEPRECATED RDW RBC AUTO: 42.9 FL (ref 35.1–43.9)
EOSINOPHIL # BLD AUTO: 0.32 10*3/MM3 (ref 0–0.7)
EOSINOPHIL NFR BLD AUTO: 4.2 % (ref 0–7)
ERYTHROCYTE [DISTWIDTH] IN BLOOD BY AUTOMATED COUNT: 14.2 % (ref 11.5–14.5)
GFR SERPL CREATININE-BSD FRML MDRD: 128 ML/MIN/1.73 (ref 70–162)
GLOBULIN UR ELPH-MCNC: 3.5 GM/DL (ref 2.3–3.5)
GLUCOSE BLD-MCNC: 282 MG/DL (ref 60–100)
HCT VFR BLD AUTO: 39.8 % (ref 39–49)
HGB BLD-MCNC: 13.2 G/DL (ref 13.7–17.3)
HOLD SPECIMEN: NORMAL
HOLD SPECIMEN: NORMAL
IMM GRANULOCYTES # BLD: 0.16 10*3/MM3 (ref 0–0.02)
IMM GRANULOCYTES NFR BLD: 2.1 % (ref 0–0.5)
LYMPHOCYTES # BLD AUTO: 1.65 10*3/MM3 (ref 0.6–4.2)
LYMPHOCYTES NFR BLD AUTO: 21.6 % (ref 10–50)
MCH RBC QN AUTO: 27.3 PG (ref 26.5–34)
MCHC RBC AUTO-ENTMCNC: 33.2 G/DL (ref 31.5–36.3)
MCV RBC AUTO: 82.4 FL (ref 80–98)
MONOCYTES # BLD AUTO: 0.75 10*3/MM3 (ref 0–0.9)
MONOCYTES NFR BLD AUTO: 9.8 % (ref 0–12)
NEUTROPHILS # BLD AUTO: 4.75 10*3/MM3 (ref 2–8.6)
NEUTROPHILS NFR BLD AUTO: 62.2 % (ref 37–80)
NT-PROBNP SERPL-MCNC: 47.8 PG/ML (ref 0–450)
PLATELET # BLD AUTO: 165 10*3/MM3 (ref 150–450)
PMV BLD AUTO: 9 FL (ref 8–12)
POTASSIUM BLD-SCNC: 4 MMOL/L (ref 3.5–5.1)
PROT SERPL-MCNC: 7.1 G/DL (ref 6.3–8.6)
RBC # BLD AUTO: 4.83 10*6/MM3 (ref 4.37–5.74)
SODIUM BLD-SCNC: 133 MMOL/L (ref 137–145)
TROPONIN I SERPL-MCNC: <0.012 NG/ML
TROPONIN I SERPL-MCNC: <0.012 NG/ML
WBC NRBC COR # BLD: 7.64 10*3/MM3 (ref 3.2–9.8)
WHOLE BLOOD HOLD SPECIMEN: NORMAL
WHOLE BLOOD HOLD SPECIMEN: NORMAL

## 2017-07-16 PROCEDURE — 96374 THER/PROPH/DIAG INJ IV PUSH: CPT

## 2017-07-16 PROCEDURE — 80053 COMPREHEN METABOLIC PANEL: CPT | Performed by: FAMILY MEDICINE

## 2017-07-16 PROCEDURE — 93005 ELECTROCARDIOGRAM TRACING: CPT | Performed by: NURSE PRACTITIONER

## 2017-07-16 PROCEDURE — 93010 ELECTROCARDIOGRAM REPORT: CPT | Performed by: INTERNAL MEDICINE

## 2017-07-16 PROCEDURE — 85025 COMPLETE CBC W/AUTO DIFF WBC: CPT | Performed by: FAMILY MEDICINE

## 2017-07-16 PROCEDURE — 93005 ELECTROCARDIOGRAM TRACING: CPT | Performed by: FAMILY MEDICINE

## 2017-07-16 PROCEDURE — 99284 EMERGENCY DEPT VISIT MOD MDM: CPT

## 2017-07-16 PROCEDURE — 71010 HC CHEST PA OR AP: CPT

## 2017-07-16 PROCEDURE — 25010000002 MORPHINE PER 10 MG: Performed by: FAMILY MEDICINE

## 2017-07-16 PROCEDURE — 93005 ELECTROCARDIOGRAM TRACING: CPT

## 2017-07-16 PROCEDURE — 96375 TX/PRO/DX INJ NEW DRUG ADDON: CPT

## 2017-07-16 PROCEDURE — 84484 ASSAY OF TROPONIN QUANT: CPT | Performed by: FAMILY MEDICINE

## 2017-07-16 PROCEDURE — 83880 ASSAY OF NATRIURETIC PEPTIDE: CPT | Performed by: FAMILY MEDICINE

## 2017-07-16 RX ORDER — CLONIDINE HYDROCHLORIDE 0.2 MG/1
0.2 TABLET ORAL ONCE
Status: COMPLETED | OUTPATIENT
Start: 2017-07-16 | End: 2017-07-16

## 2017-07-16 RX ORDER — SODIUM CHLORIDE 0.9 % (FLUSH) 0.9 %
10 SYRINGE (ML) INJECTION AS NEEDED
Status: DISCONTINUED | OUTPATIENT
Start: 2017-07-16 | End: 2017-07-16 | Stop reason: HOSPADM

## 2017-07-16 RX ORDER — CLONIDINE HYDROCHLORIDE 0.1 MG/1
0.1 TABLET ORAL ONCE
Status: DISCONTINUED | OUTPATIENT
Start: 2017-07-16 | End: 2017-07-16 | Stop reason: HOSPADM

## 2017-07-16 RX ORDER — LABETALOL HYDROCHLORIDE 5 MG/ML
40 INJECTION, SOLUTION INTRAVENOUS ONCE
Status: COMPLETED | OUTPATIENT
Start: 2017-07-16 | End: 2017-07-16

## 2017-07-16 RX ORDER — MORPHINE SULFATE 4 MG/ML
4 INJECTION, SOLUTION INTRAMUSCULAR; INTRAVENOUS ONCE
Status: COMPLETED | OUTPATIENT
Start: 2017-07-16 | End: 2017-07-16

## 2017-07-16 RX ORDER — ASPIRIN 325 MG
325 TABLET ORAL ONCE
Status: COMPLETED | OUTPATIENT
Start: 2017-07-16 | End: 2017-07-16

## 2017-07-16 RX ADMIN — NITROGLYCERIN 1 INCH: 20 OINTMENT TOPICAL at 12:15

## 2017-07-16 RX ADMIN — MORPHINE SULFATE 4 MG: 4 INJECTION, SOLUTION INTRAMUSCULAR; INTRAVENOUS at 16:25

## 2017-07-16 RX ADMIN — ASPIRIN 325 MG: 325 TABLET, COATED ORAL at 10:10

## 2017-07-16 RX ADMIN — Medication 10 ML: at 10:06

## 2017-07-16 RX ADMIN — CLONIDINE HYDROCHLORIDE 0.2 MG: 0.2 TABLET ORAL at 18:31

## 2017-07-16 RX ADMIN — LABETALOL HYDROCHLORIDE 40 MG: 5 INJECTION, SOLUTION INTRAVENOUS at 16:25

## 2017-07-16 NOTE — ED NOTES
Informed patient  needed. Patient request to wait until med time allowed to drive home.     Liz Cisneros LPN  07/16/17 [id number]

## 2017-07-16 NOTE — ED TRIAGE NOTES
Pt presents to ED with c/o chest pain, nausea, and shortness of breath that started a couple hours ago.

## 2017-07-16 NOTE — DISCHARGE INSTRUCTIONS
.Return to ED if s/s worsening or pain increases. Contact PCP or return to ED for further evaluation if needed.

## 2017-07-16 NOTE — ED NOTES
Pt c/o of chest pain that started two hours ago. Pt has taken 2 nitro and some tylenol neither of which helped with the pain.      Pippa Nava RN  07/16/17 [id number]

## 2017-07-16 NOTE — ED PROVIDER NOTES
Subjective   HPI Comments: Obese male well known to the ED c/o chest pain, reports onset 730 this am woke him up, pain noted as aching  Radiating to left shoulder. Denies soa at this time but does have chronic BABCOCK. Denies n/v/ diaphoresis     Patient is a 36 y.o. male presenting with chest pain.   Chest Pain   Pain location:  Substernal area and L chest  Pain quality: aching    Pain radiates to:  L shoulder  Pain severity:  Mild  Duration:  11 hours  Timing:  Intermittent  Progression:  Worsening  Chronicity:  Recurrent  Relieved by:  Nothing  Worsened by:  Nothing  Associated symptoms: fatigue, palpitations and weakness    Associated symptoms: no abdominal pain, no dizziness, no fever, no nausea, no numbness and no vomiting        Review of Systems   Constitutional: Positive for fatigue. Negative for activity change, appetite change, chills and fever.   HENT: Negative for congestion, facial swelling, rhinorrhea and sinus pressure.    Cardiovascular: Positive for chest pain and palpitations. Negative for leg swelling.   Gastrointestinal: Negative for abdominal distention, abdominal pain, nausea and vomiting.   Genitourinary: Negative.    Musculoskeletal: Positive for arthralgias and gait problem.   Skin: Negative.    Neurological: Positive for weakness. Negative for dizziness, light-headedness and numbness.   Psychiatric/Behavioral: Negative.    All other systems reviewed and are negative.      Past Medical History:   Diagnosis Date   • Allergic rhinitis    • Asthma    • Chest pain    • Chronic back pain    • Chronic pain    • Diabetes mellitus    • GERD (gastroesophageal reflux disease)    • Hyperlipidemia    • Hypertension    • Low back pain    • Morbid obesity    • Pulmonary embolism    • RLS (restless legs syndrome)    • Seizures    • Sleep apnea        Allergies   Allergen Reactions   • Glipizide      Slurred speech     • Phenergan [Promethazine Hcl]      Burning veins   • Risperdal [Risperidone]      Slurred  speech   • Tramadol      seizures   • Reglan [Metoclopramide] Anxiety       Past Surgical History:   Procedure Laterality Date   • ADENOIDECTOMY     • CARDIAC CATHETERIZATION N/A 3/15/2017    Procedure: Left Heart Cath;  Surgeon: Edwige Briceno MD;  Location: [facility] CATH INVASIVE LOCATION;  Service:    • CARDIAC CATHETERIZATION N/A 3/15/2017    Procedure: Stent SOPHIA coronary;  Surgeon: Edwige Briceno MD;  Location: [facility] CATH INVASIVE LOCATION;  Service:    • CHOLECYSTECTOMY     • CORONARY ANGIOPLASTY WITH STENT PLACEMENT     • WI RT/LT HEART CATHETERS N/A 3/15/2017    Procedure: Percutaneous Coronary Intervention;  Surgeon: Edwige Briceno MD;  Location: [facility] CATH INVASIVE LOCATION;  Service: Cardiovascular   • TONSILLECTOMY     • TRANSESOPHAGEAL ECHOCARDIOGRAM (MONICA)         Family History   Problem Relation Age of Onset   • Cancer Other    • Coronary artery disease Other    • Diabetes Other    • Heart disease Other    • Hypertension Other        Social History     Social History   • Marital status:      Spouse name: Cori   • Number of children: 0   • Years of education: N/A     Occupational History   • Disabled      Social History Main Topics   • Smoking status: Former Smoker   • Smokeless tobacco: Current User     Types: Snuff   • Alcohol use No   • Drug use: No   • Sexual activity: Defer     Other Topics Concern   • None     Social History Narrative   • None           Objective   Physical Exam   Constitutional: He is oriented to person, place, and time. He appears well-developed and well-nourished.   HENT:   Head: Normocephalic.   Right Ear: External ear normal.   Left Ear: External ear normal.   Nose: Nose normal.   Mouth/Throat: Oropharynx is clear and moist.   Eyes: Pupils are equal, round, and reactive to light.   Neck: Normal range of motion. Neck supple.   Cardiovascular: Normal rate, regular rhythm, S1 normal, S2 normal, normal heart sounds and intact distal pulses.    Pulmonary/Chest: Effort normal  and breath sounds normal.   Abdominal: Soft. Normal appearance.   Musculoskeletal: Normal range of motion.   Neurological: He is alert and oriented to person, place, and time. GCS eye subscore is 4. GCS verbal subscore is 5. GCS motor subscore is 6.   Nursing note and vitals reviewed.      Procedures         ED Course  ED Course      .  Results for orders placed or performed during the hospital encounter of 07/16/17   Troponin   Result Value Ref Range    Troponin I <0.012 <=0.034 ng/mL   Troponin   Result Value Ref Range    Troponin I <0.012 <=0.034 ng/mL   Comprehensive Metabolic Panel   Result Value Ref Range    Glucose 282 (H) 60 - 100 mg/dL    BUN 8 7 - 21 mg/dL    Creatinine 0.69 (L) 0.70 - 1.30 mg/dL    Sodium 133 (L) 137 - 145 mmol/L    Potassium 4.0 3.5 - 5.1 mmol/L    Chloride 100 95 - 110 mmol/L    CO2 26.0 22.0 - 31.0 mmol/L    Calcium 8.0 (L) 8.4 - 10.2 mg/dL    Total Protein 7.1 6.3 - 8.6 g/dL    Albumin 3.60 3.40 - 4.80 g/dL    ALT (SGPT) 42 21 - 72 U/L    AST (SGOT) 28 17 - 59 U/L    Alkaline Phosphatase 88 38 - 126 U/L    Total Bilirubin 0.4 0.2 - 1.3 mg/dL    eGFR Non  Amer 128 70 - 162 mL/min/1.73    Globulin 3.5 2.3 - 3.5 gm/dL    A/G Ratio 1.0 (L) 1.1 - 1.8 g/dL    BUN/Creatinine Ratio 11.6 7.0 - 25.0    Anion Gap 7.0 5.0 - 15.0 mmol/L   BNP   Result Value Ref Range    proBNP 47.8 0.0 - 450.0 pg/mL   CBC Auto Differential   Result Value Ref Range    WBC 7.64 3.20 - 9.80 10*3/mm3    RBC 4.83 4.37 - 5.74 10*6/mm3    Hemoglobin 13.2 (L) 13.7 - 17.3 g/dL    Hematocrit 39.8 39.0 - 49.0 %    MCV 82.4 80.0 - 98.0 fL    MCH 27.3 26.5 - 34.0 pg    MCHC 33.2 31.5 - 36.3 g/dL    RDW 14.2 11.5 - 14.5 %    RDW-SD 42.9 35.1 - 43.9 fl    MPV 9.0 8.0 - 12.0 fL    Platelets 165 150 - 450 10*3/mm3    Neutrophil % 62.2 37.0 - 80.0 %    Lymphocyte % 21.6 10.0 - 50.0 %    Monocyte % 9.8 0.0 - 12.0 %    Eosinophil % 4.2 0.0 - 7.0 %    Basophil % 0.1 0.0 - 2.0 %    Immature Grans % 2.1 (H) 0.0 - 0.5 %     Neutrophils, Absolute 4.75 2.00 - 8.60 10*3/mm3    Lymphocytes, Absolute 1.65 0.60 - 4.20 10*3/mm3    Monocytes, Absolute 0.75 0.00 - 0.90 10*3/mm3    Eosinophils, Absolute 0.32 0.00 - 0.70 10*3/mm3    Basophils, Absolute 0.01 0.00 - 0.20 10*3/mm3    Immature Grans, Absolute 0.16 (H) 0.00 - 0.02 10*3/mm3   Light Blue Top   Result Value Ref Range    Extra Tube hold for add-on    Green Top (Gel)   Result Value Ref Range    Extra Tube Hold for add-ons.    Lavender Top   Result Value Ref Range    Extra Tube hold for add-on    Gold Top - SST   Result Value Ref Range    Extra Tube Hold for add-ons.                  MDM    Final diagnoses:   Essential hypertension   Pulmonary embolism on long-term anticoagulation therapy   Coronary arteriosclerosis in native artery            Fabio Jordan, APRN  07/16/17 1702       Fabio Jordan, APRN  07/16/17 173

## 2017-07-17 ENCOUNTER — OFFICE VISIT (OUTPATIENT)
Dept: FAMILY MEDICINE CLINIC | Facility: CLINIC | Age: 37
End: 2017-07-17

## 2017-07-17 VITALS
SYSTOLIC BLOOD PRESSURE: 126 MMHG | BODY MASS INDEX: 44.1 KG/M2 | TEMPERATURE: 97.3 F | RESPIRATION RATE: 20 BRPM | HEART RATE: 66 BPM | WEIGHT: 315 LBS | DIASTOLIC BLOOD PRESSURE: 80 MMHG | HEIGHT: 71 IN | OXYGEN SATURATION: 95 %

## 2017-07-17 DIAGNOSIS — E66.01 MORBID OBESITY DUE TO EXCESS CALORIES (HCC): Chronic | ICD-10-CM

## 2017-07-17 DIAGNOSIS — M54.50 CHRONIC LOW BACK PAIN WITHOUT SCIATICA, UNSPECIFIED BACK PAIN LATERALITY: ICD-10-CM

## 2017-07-17 DIAGNOSIS — I10 ESSENTIAL HYPERTENSION: ICD-10-CM

## 2017-07-17 DIAGNOSIS — E78.5 HYPERLIPIDEMIA, UNSPECIFIED HYPERLIPIDEMIA TYPE: Chronic | ICD-10-CM

## 2017-07-17 DIAGNOSIS — M54.42 CHRONIC LEFT-SIDED LOW BACK PAIN WITH LEFT-SIDED SCIATICA: Chronic | ICD-10-CM

## 2017-07-17 DIAGNOSIS — E11.8 TYPE 2 DIABETES MELLITUS WITH COMPLICATION, WITHOUT LONG-TERM CURRENT USE OF INSULIN (HCC): Primary | ICD-10-CM

## 2017-07-17 DIAGNOSIS — G89.29 CHRONIC LOW BACK PAIN WITHOUT SCIATICA, UNSPECIFIED BACK PAIN LATERALITY: ICD-10-CM

## 2017-07-17 DIAGNOSIS — I25.10 CORONARY ARTERIOSCLEROSIS IN NATIVE ARTERY: Chronic | ICD-10-CM

## 2017-07-17 DIAGNOSIS — G89.29 CHRONIC LEFT-SIDED LOW BACK PAIN WITH LEFT-SIDED SCIATICA: Chronic | ICD-10-CM

## 2017-07-17 PROCEDURE — 99214 OFFICE O/P EST MOD 30 MIN: CPT | Performed by: NURSE PRACTITIONER

## 2017-07-17 RX ORDER — HYDROCODONE BITARTRATE AND ACETAMINOPHEN 10; 325 MG/1; MG/1
10-325 TABLET ORAL EVERY 6 HOURS PRN
Refills: 0 | COMMUNITY
Start: 2017-07-10 | End: 2017-09-11

## 2017-07-17 RX ORDER — TIZANIDINE HYDROCHLORIDE 2 MG/1
2 CAPSULE, GELATIN COATED ORAL 3 TIMES DAILY PRN
Qty: 60 CAPSULE | Refills: 1 | Status: SHIPPED | OUTPATIENT
Start: 2017-07-17 | End: 2017-09-11

## 2017-07-17 NOTE — PROGRESS NOTES
"Chief Complaint   Patient presents with   • Diabetes     f/u labs       Subjective   HPI   Yordy Hansen is a 36 y.o. male presents to the office for evaluation of T2DM and review of labs.   He denies acute complaints today.     Recent ER visit: 07/16/2017 for chest pain.     He has an extensive PMH that includes HTN, recent PE, T2DM, morbid obesity, chronic chest pain, factor 2 def., and hyperlipidemia.     He is followed by Dr. Boone and Yolanda Bautista, APRN- cardiology. He had a cardiac stent placed in July 2016 and another placed in 03/2017. He is also a coumadin/anticoagualtion clinic patient.     He was recently hospitalized through [facility] on 05/09/2017 for multiple PE. He is now prescribed lifelong xarelto. He is on multiple medications including lifelong xarelto, multiple T2DM agents, and antihypertensives.      T2DM:  managed with metformin, victoza, and invokana. Does not monitor daily FBG.     Hyperlipidemia:  Controlled with pravastatin    HTN:  Well controlled with lisinopril, clonidine, metoprolol, amlodipine, isosorbide    Angina:  moderately controlled with renexa  PRN nitro for chest pain    Anticoagulant:  Xarelto- lifelong for pulmonary PE  plavix-coronary arteriosclerosis    Antidepressant:  Managed with zoloft    He is requesting to try zanaflex today, per advisement from pharmacist since robaxin is no longer covered by his insurance.     HPI Comments: Pt states, \" I haven't been checking my blood sugars.\"    Diabetes   He presents for his follow-up diabetic visit. He has type 2 diabetes mellitus. No MedicAlert identification noted. His disease course has been fluctuating. There are no hypoglycemic associated symptoms. Pertinent negatives for hypoglycemia include no confusion, dizziness, headaches or nervousness/anxiousness. Associated symptoms include chest pain, fatigue, polydipsia and polyuria. Pertinent negatives for diabetes include no polyphagia and no weakness. There are no " hypoglycemic complications. Symptoms are stable. Diabetic complications include heart disease. Risk factors for coronary artery disease include sedentary lifestyle, male sex, hypertension, obesity, dyslipidemia and diabetes mellitus. Current diabetic treatment includes oral agent (dual therapy). He is compliant with treatment most of the time. His weight is stable. He is following a generally unhealthy diet. When asked about meal planning, he reported none. He has not had a previous visit with a dietitian. He rarely participates in exercise. An ACE inhibitor/angiotensin II receptor blocker is being taken.       Family History   Problem Relation Age of Onset   • Cancer Other    • Coronary artery disease Other    • Diabetes Other    • Heart disease Other    • Hypertension Other      Social History     Social History   • Marital status:      Spouse name: Cori   • Number of children: 0   • Years of education: N/A     Occupational History   • Disabled      Social History Main Topics   • Smoking status: Former Smoker   • Smokeless tobacco: Current User     Types: Snuff   • Alcohol use No   • Drug use: No   • Sexual activity: Defer     Other Topics Concern   • Not on file     Social History Narrative       The following portions of the patient's history were reviewed and updated as appropriate: allergies, current medications, past family history, past medical history, past social history, past surgical history and problem list.    Review of Systems   Constitutional: Positive for fatigue. Negative for activity change, appetite change, chills, fever and unexpected weight change.   HENT: Negative.  Negative for congestion, drooling, facial swelling, postnasal drip, rhinorrhea, sinus pressure, sore throat, trouble swallowing and voice change.    Eyes: Negative.  Negative for photophobia, pain, discharge and visual disturbance.   Respiratory: Negative.  Negative for apnea, cough, choking and wheezing.   "  Cardiovascular: Positive for chest pain. Negative for palpitations and leg swelling.   Gastrointestinal: Negative.  Negative for abdominal distention, abdominal pain, constipation, diarrhea, nausea and vomiting.   Endocrine: Positive for polydipsia and polyuria. Negative for polyphagia.   Genitourinary: Negative.  Negative for decreased urine volume, difficulty urinating and dysuria.   Musculoskeletal: Negative.  Negative for arthralgias, gait problem and myalgias.   Skin: Negative.  Negative for rash and wound.   Allergic/Immunologic: Negative.    Neurological: Negative.  Negative for dizziness, syncope, weakness, light-headedness, numbness and headaches.   Hematological: Negative.    Psychiatric/Behavioral: Negative.  Negative for confusion, decreased concentration and sleep disturbance. The patient is not nervous/anxious.      14 Point ROS completed with pertinent positives discussed. All other systems reviewed and are negative.       Objective   Encounter Vitals  /80  Pulse 66  Temp 97.3 °F (36.3 °C) (Tympanic)   Resp 20  Ht 71\" (180.3 cm)  Wt (!) 448 lb (203 kg)  SpO2 95%  BMI 62.48 kg/m2    Physical Exam   Constitutional: He is oriented to person, place, and time. Vital signs are normal. He appears well-developed and well-nourished.  Non-toxic appearance.   Morbid obesity   HENT:   Head: Normocephalic and atraumatic.   Right Ear: Tympanic membrane, external ear and ear canal normal.   Left Ear: Tympanic membrane, external ear and ear canal normal.   Nose: Nose normal.   Mouth/Throat: Uvula is midline, oropharynx is clear and moist and mucous membranes are normal. No posterior oropharyngeal edema or posterior oropharyngeal erythema.   Eyes: Lids are normal. Pupils are equal, round, and reactive to light.   Neck: Trachea normal and normal range of motion. Neck supple. No thyroid mass present.   Cardiovascular: Normal rate, regular rhythm, S1 normal, S2 normal and intact distal pulses.  PMI is " displaced.  Exam reveals distant heart sounds. Exam reveals no gallop and no friction rub.    No murmur heard.  Pulmonary/Chest: Effort normal and breath sounds normal. No respiratory distress. He has no wheezes. He has no rales.   Labored breathing with speaking   Abdominal: Soft. Normal appearance and bowel sounds are normal. He exhibits no mass. There is no rebound and no guarding.   Musculoskeletal: Normal range of motion.   Lymphadenopathy:     He has no cervical adenopathy.   Neurological: He is alert and oriented to person, place, and time. He has normal strength. No cranial nerve deficit or sensory deficit. Gait normal.   Skin: Skin is warm and dry. No rash noted.   Psychiatric: He has a normal mood and affect. His speech is normal and behavior is normal. Judgment and thought content normal. Cognition and memory are normal.   Nursing note and vitals reviewed.      Pertinent Labs  Admission on 07/16/2017, Discharged on 07/16/2017   Component Date Value Ref Range Status   • Troponin I 07/16/2017 <0.012  <=0.034 ng/mL Final   • Troponin I 07/16/2017 <0.012  <=0.034 ng/mL Final   • Glucose 07/16/2017 282* 60 - 100 mg/dL Final   • BUN 07/16/2017 8  7 - 21 mg/dL Final   • Creatinine 07/16/2017 0.69* 0.70 - 1.30 mg/dL Final   • Sodium 07/16/2017 133* 137 - 145 mmol/L Final   • Potassium 07/16/2017 4.0  3.5 - 5.1 mmol/L Final   • Chloride 07/16/2017 100  95 - 110 mmol/L Final   • CO2 07/16/2017 26.0  22.0 - 31.0 mmol/L Final   • Calcium 07/16/2017 8.0* 8.4 - 10.2 mg/dL Final   • Total Protein 07/16/2017 7.1  6.3 - 8.6 g/dL Final   • Albumin 07/16/2017 3.60  3.40 - 4.80 g/dL Final   • ALT (SGPT) 07/16/2017 42  21 - 72 U/L Final   • AST (SGOT) 07/16/2017 28  17 - 59 U/L Final   • Alkaline Phosphatase 07/16/2017 88  38 - 126 U/L Final   • Total Bilirubin 07/16/2017 0.4  0.2 - 1.3 mg/dL Final   • eGFR Non African Amer 07/16/2017 128  70 - 162 mL/min/1.73 Final   • Globulin 07/16/2017 3.5  2.3 - 3.5 gm/dL Final   • A/G  Ratio 07/16/2017 1.0* 1.1 - 1.8 g/dL Final   • BUN/Creatinine Ratio 07/16/2017 11.6  7.0 - 25.0 Final   • Anion Gap 07/16/2017 7.0  5.0 - 15.0 mmol/L Final   • proBNP 07/16/2017 47.8  0.0 - 450.0 pg/mL Final   • Extra Tube 07/16/2017 hold for add-on   Final    Auto resulted   • Extra Tube 07/16/2017 Hold for add-ons.   Final    Auto resulted.   • Extra Tube 07/16/2017 hold for add-on   Final    Auto resulted   • Extra Tube 07/16/2017 Hold for add-ons.   Final    Auto resulted.   • WBC 07/16/2017 7.64  3.20 - 9.80 10*3/mm3 Final   • RBC 07/16/2017 4.83  4.37 - 5.74 10*6/mm3 Final   • Hemoglobin 07/16/2017 13.2* 13.7 - 17.3 g/dL Final   • Hematocrit 07/16/2017 39.8  39.0 - 49.0 % Final   • MCV 07/16/2017 82.4  80.0 - 98.0 fL Final   • MCH 07/16/2017 27.3  26.5 - 34.0 pg Final   • MCHC 07/16/2017 33.2  31.5 - 36.3 g/dL Final   • RDW 07/16/2017 14.2  11.5 - 14.5 % Final   • RDW-SD 07/16/2017 42.9  35.1 - 43.9 fl Final   • MPV 07/16/2017 9.0  8.0 - 12.0 fL Final   • Platelets 07/16/2017 165  150 - 450 10*3/mm3 Final   • Neutrophil % 07/16/2017 62.2  37.0 - 80.0 % Final   • Lymphocyte % 07/16/2017 21.6  10.0 - 50.0 % Final   • Monocyte % 07/16/2017 9.8  0.0 - 12.0 % Final   • Eosinophil % 07/16/2017 4.2  0.0 - 7.0 % Final   • Basophil % 07/16/2017 0.1  0.0 - 2.0 % Final   • Immature Grans % 07/16/2017 2.1* 0.0 - 0.5 % Final   • Neutrophils, Absolute 07/16/2017 4.75  2.00 - 8.60 10*3/mm3 Final   • Lymphocytes, Absolute 07/16/2017 1.65  0.60 - 4.20 10*3/mm3 Final   • Monocytes, Absolute 07/16/2017 0.75  0.00 - 0.90 10*3/mm3 Final   • Eosinophils, Absolute 07/16/2017 0.32  0.00 - 0.70 10*3/mm3 Final   • Basophils, Absolute 07/16/2017 0.01  0.00 - 0.20 10*3/mm3 Final   • Immature Grans, Absolute 07/16/2017 0.16* 0.00 - 0.02 10*3/mm3 Final   Admission on 06/03/2017, Discharged on 06/04/2017   Component Date Value Ref Range Status   • Troponin I 06/03/2017 <0.012  <=0.034 ng/mL Final   • Troponin I 06/03/2017 <0.012  <=0.034  ng/mL Final   • Glucose 06/03/2017 268* 60 - 100 mg/dL Final   • BUN 06/03/2017 3* 7 - 21 mg/dL Final   • Creatinine 06/03/2017 0.67* 0.70 - 1.30 mg/dL Final   • Sodium 06/03/2017 137  137 - 145 mmol/L Final   • Potassium 06/03/2017 3.6  3.5 - 5.1 mmol/L Final   • Chloride 06/03/2017 102  95 - 110 mmol/L Final   • CO2 06/03/2017 24.0  22.0 - 31.0 mmol/L Final   • Calcium 06/03/2017 8.3* 8.4 - 10.2 mg/dL Final   • Total Protein 06/03/2017 7.0  6.3 - 8.6 g/dL Final   • Albumin 06/03/2017 3.50  3.40 - 4.80 g/dL Final   • ALT (SGPT) 06/03/2017 47  21 - 72 U/L Final   • AST (SGOT) 06/03/2017 22  17 - 59 U/L Final   • Alkaline Phosphatase 06/03/2017 62  38 - 126 U/L Final   • Total Bilirubin 06/03/2017 0.3  0.2 - 1.3 mg/dL Final   • eGFR Non African Amer 06/03/2017 133  70 - 162 mL/min/1.73 Final   • Globulin 06/03/2017 3.5  2.3 - 3.5 gm/dL Final   • A/G Ratio 06/03/2017 1.0* 1.1 - 1.8 g/dL Final   • BUN/Creatinine Ratio 06/03/2017 4.5* 7.0 - 25.0 Final   • Anion Gap 06/03/2017 11.0  5.0 - 15.0 mmol/L Final   • proBNP 06/03/2017 86.9  0.0 - 450.0 pg/mL Final   • Protime 06/03/2017 12.3  11.1 - 15.3 Seconds Final   • INR 06/03/2017 0.92  0.80 - 1.20 Final   • Extra Tube 06/03/2017 hold for add-on   Final    Auto resulted   • Extra Tube 06/03/2017 Hold for add-ons.   Final    Auto resulted.   • Extra Tube 06/03/2017 hold for add-on   Final    Auto resulted   • Extra Tube 06/03/2017 Hold for add-ons.   Final    Auto resulted.   • WBC 06/03/2017 7.37  3.20 - 9.80 10*3/mm3 Final   • RBC 06/03/2017 4.37  4.37 - 5.74 10*6/mm3 Final   • Hemoglobin 06/03/2017 11.9* 13.7 - 17.3 g/dL Final   • Hematocrit 06/03/2017 36.7* 39.0 - 49.0 % Final   • MCV 06/03/2017 84.0  80.0 - 98.0 fL Final   • MCH 06/03/2017 27.2  26.5 - 34.0 pg Final   • MCHC 06/03/2017 32.4  31.5 - 36.3 g/dL Final   • RDW 06/03/2017 14.6* 11.5 - 14.5 % Final   • RDW-SD 06/03/2017 44.4* 35.1 - 43.9 fl Final   • MPV 06/03/2017 9.1  8.0 - 12.0 fL Final   • Platelets  06/03/2017 157  150 - 450 10*3/mm3 Final   • Neutrophil % 06/03/2017 69.7  37.0 - 80.0 % Final   • Lymphocyte % 06/03/2017 16.8  10.0 - 50.0 % Final   • Monocyte % 06/03/2017 8.7  0.0 - 12.0 % Final   • Eosinophil % 06/03/2017 3.9  0.0 - 7.0 % Final   • Basophil % 06/03/2017 0.1  0.0 - 2.0 % Final   • Immature Grans % 06/03/2017 0.8* 0.0 - 0.5 % Final   • Neutrophils, Absolute 06/03/2017 5.13  2.00 - 8.60 10*3/mm3 Final   • Lymphocytes, Absolute 06/03/2017 1.24  0.60 - 4.20 10*3/mm3 Final   • Monocytes, Absolute 06/03/2017 0.64  0.00 - 0.90 10*3/mm3 Final   • Eosinophils, Absolute 06/03/2017 0.29  0.00 - 0.70 10*3/mm3 Final   • Basophils, Absolute 06/03/2017 0.01  0.00 - 0.20 10*3/mm3 Final   • Immature Grans, Absolute 06/03/2017 0.06* 0.00 - 0.02 10*3/mm3 Final   • Creatine Kinase 06/03/2017 229* 55 - 170 U/L Final   • CKMB 06/03/2017 1.93  0.00 - 5.00 ng/mL Final   • Magnesium 06/03/2017 1.9  1.6 - 2.3 mg/dL Final   • Glucose 06/04/2017 143* 60 - 100 mg/dL Final   • BUN 06/04/2017 3* 7 - 21 mg/dL Final   • Creatinine 06/04/2017 0.67* 0.70 - 1.30 mg/dL Final   • Sodium 06/04/2017 137  137 - 145 mmol/L Final   • Potassium 06/04/2017 3.6  3.5 - 5.1 mmol/L Final   • Chloride 06/04/2017 102  95 - 110 mmol/L Final   • CO2 06/04/2017 26.0  22.0 - 31.0 mmol/L Final   • Calcium 06/04/2017 8.6  8.4 - 10.2 mg/dL Final   • eGFR Non African Amer 06/04/2017 133  >60 mL/min/1.73 Final   • BUN/Creatinine Ratio 06/04/2017 4.5* 7.0 - 25.0 Final   • Anion Gap 06/04/2017 9.0  5.0 - 15.0 mmol/L Final   • Glucose 06/04/2017 146* 60 - 100 mg/dL Final   • BUN 06/04/2017 4* 7 - 21 mg/dL Final   • Creatinine 06/04/2017 0.71  0.70 - 1.30 mg/dL Final   • Sodium 06/04/2017 136* 137 - 145 mmol/L Final   • Potassium 06/04/2017 3.9  3.5 - 5.1 mmol/L Final   • Chloride 06/04/2017 100  95 - 110 mmol/L Final   • CO2 06/04/2017 28.0  22.0 - 31.0 mmol/L Final   • Calcium 06/04/2017 8.5  8.4 - 10.2 mg/dL Final   • eGFR Non African Amer 06/04/2017 124   >60 mL/min/1.73 Final   • BUN/Creatinine Ratio 06/04/2017 5.6* 7.0 - 25.0 Final   • Anion Gap 06/04/2017 8.0  5.0 - 15.0 mmol/L Final   • Magnesium 06/04/2017 1.8  1.6 - 2.3 mg/dL Final   • Magnesium 06/04/2017 1.9  1.6 - 2.3 mg/dL Final   • Troponin I 06/04/2017 <0.012  <=0.034 ng/mL Final   • WBC 06/04/2017 7.90  3.20 - 9.80 10*3/mm3 Final   • RBC 06/04/2017 4.33* 4.37 - 5.74 10*6/mm3 Final   • Hemoglobin 06/04/2017 11.9* 13.7 - 17.3 g/dL Final   • Hematocrit 06/04/2017 36.0* 39.0 - 49.0 % Final   • MCV 06/04/2017 83.1  80.0 - 98.0 fL Final   • MCH 06/04/2017 27.5  26.5 - 34.0 pg Final   • MCHC 06/04/2017 33.1  31.5 - 36.3 g/dL Final   • RDW 06/04/2017 14.6* 11.5 - 14.5 % Final   • RDW-SD 06/04/2017 44.1* 35.1 - 43.9 fl Final   • MPV 06/04/2017 9.3  8.0 - 12.0 fL Final   • Platelets 06/04/2017 170  150 - 450 10*3/mm3 Final   • Neutrophil % 06/04/2017 64.1  37.0 - 80.0 % Final   • Lymphocyte % 06/04/2017 21.6  10.0 - 50.0 % Final   • Monocyte % 06/04/2017 8.5  0.0 - 12.0 % Final   • Eosinophil % 06/04/2017 4.6  0.0 - 7.0 % Final   • Basophil % 06/04/2017 0.1  0.0 - 2.0 % Final   • Immature Grans % 06/04/2017 1.1* 0.0 - 0.5 % Final   • Neutrophils, Absolute 06/04/2017 5.06  2.00 - 8.60 10*3/mm3 Final   • Lymphocytes, Absolute 06/04/2017 1.71  0.60 - 4.20 10*3/mm3 Final   • Monocytes, Absolute 06/04/2017 0.67  0.00 - 0.90 10*3/mm3 Final   • Eosinophils, Absolute 06/04/2017 0.36  0.00 - 0.70 10*3/mm3 Final   • Basophils, Absolute 06/04/2017 0.01  0.00 - 0.20 10*3/mm3 Final   • Immature Grans, Absolute 06/04/2017 0.09* 0.00 - 0.02 10*3/mm3 Final   • WBC 06/04/2017 8.75  3.20 - 9.80 10*3/mm3 Final   • RBC 06/04/2017 4.50  4.37 - 5.74 10*6/mm3 Final   • Hemoglobin 06/04/2017 12.5* 13.7 - 17.3 g/dL Final   • Hematocrit 06/04/2017 37.3* 39.0 - 49.0 % Final   • MCV 06/04/2017 82.9  80.0 - 98.0 fL Final   • MCH 06/04/2017 27.8  26.5 - 34.0 pg Final   • MCHC 06/04/2017 33.5  31.5 - 36.3 g/dL Final   • RDW 06/04/2017 14.9*  11.5 - 14.5 % Final   • RDW-SD 06/04/2017 45.3* 35.1 - 43.9 fl Final   • MPV 06/04/2017 9.1  8.0 - 12.0 fL Final   • Platelets 06/04/2017 199  150 - 450 10*3/mm3 Final   • Neutrophil % 06/04/2017 64.8  37.0 - 80.0 % Final   • Lymphocyte % 06/04/2017 20.1  10.0 - 50.0 % Final   • Monocyte % 06/04/2017 9.0  0.0 - 12.0 % Final   • Eosinophil % 06/04/2017 4.7  0.0 - 7.0 % Final   • Basophil % 06/04/2017 0.3  0.0 - 2.0 % Final   • Immature Grans % 06/04/2017 1.1* 0.0 - 0.5 % Final   • Neutrophils, Absolute 06/04/2017 5.66  2.00 - 8.60 10*3/mm3 Final   • Lymphocytes, Absolute 06/04/2017 1.76  0.60 - 4.20 10*3/mm3 Final   • Monocytes, Absolute 06/04/2017 0.79  0.00 - 0.90 10*3/mm3 Final   • Eosinophils, Absolute 06/04/2017 0.41  0.00 - 0.70 10*3/mm3 Final   • Basophils, Absolute 06/04/2017 0.03  0.00 - 0.20 10*3/mm3 Final   • Immature Grans, Absolute 06/04/2017 0.10* 0.00 - 0.02 10*3/mm3 Final   • Glucose 06/04/2017 151* 70 - 130 mg/dL Final    RN NotifiedMeter: [id number]: [id number] PERRY JACKSON   Office Visit on 06/01/2017   Component Date Value Ref Range Status   • Glucose 07/11/2017 168* 70 - 100 mg/dL Final   • BUN 07/11/2017 10  8 - 25 mg/dL Final   • Creatinine 07/11/2017 0.70  0.40 - 1.30 mg/dL Final   • Sodium 07/11/2017 135  134 - 146 mmol/L Final   • Potassium 07/11/2017 4.3  3.4 - 5.4 mmol/L Final   • Chloride 07/11/2017 100  100 - 112 mmol/L Final   • CO2 07/11/2017 25.0  20.0 - 32.0 mmol/L Final   • Calcium 07/11/2017 9.2  8.4 - 10.8 mg/dL Final   • Total Protein 07/11/2017 7.9  6.7 - 8.2 g/dL Final   • Albumin 07/11/2017 3.70  3.20 - 5.50 g/dL Final   • ALT (SGPT) 07/11/2017 35  10 - 60 U/L Final   • AST (SGOT) 07/11/2017 33  10 - 60 U/L Final   • Alkaline Phosphatase 07/11/2017 70  15 - 121 U/L Final   • Total Bilirubin 07/11/2017 0.5  0.2 - 1.0 mg/dL Final   • eGFR Non African Amer 07/11/2017 126  70 - 162 mL/min/1.73 Final   • Globulin 07/11/2017 4.2  2.5 - 4.6 gm/dL Final   • A/G Ratio  07/11/2017 0.9* 1.0 - 3.0 g/dL Final   • BUN/Creatinine Ratio 07/11/2017 14.3  7.0 - 25.0 Final   • Anion Gap 07/11/2017 10.0  5.0 - 15.0 mmol/L Final   • Hemoglobin A1C 07/11/2017 7.93* 4 - 5.6 % Final   • Total Cholesterol 07/11/2017 192  150 - 200 mg/dL Final   • Triglycerides 07/11/2017 246* 35 - 160 mg/dL Final   • HDL Cholesterol 07/11/2017 38  35 - 100 mg/dL Final   • LDL Cholesterol  07/11/2017 105  mg/dL Final   • VLDL Cholesterol 07/11/2017 49.2  mg/dL Final   • LDL/HDL Ratio 07/11/2017 2.76   Final   • TSH 07/11/2017 2.540  0.460 - 4.680 mIU/mL Final   • Free T4 07/11/2017 1.34  0.78 - 2.19 ng/dL Final   • Vitamin B-12 07/11/2017 354  239 - 931 pg/mL Final   • Microalbumin/Creatinine Ratio 07/11/2017 587.8* 0.0 - 30.0 mg/g Final   • Creatinine, Urine 07/11/2017 59.2  mg/dL Final   • Microalbumin, Urine 07/11/2017 34.8  mg/L Final   • WBC 07/11/2017 8.89  3.20 - 9.80 10*3/mm3 Final   • RBC 07/11/2017 5.25  4.37 - 5.74 10*6/mm3 Final   • Hemoglobin 07/11/2017 14.4  13.7 - 17.3 g/dL Final   • Hematocrit 07/11/2017 44.0  39.0 - 49.0 % Final   • MCV 07/11/2017 83.8  80.0 - 98.0 fL Final   • MCH 07/11/2017 27.4  26.5 - 34.0 pg Final   • MCHC 07/11/2017 32.7  31.5 - 36.3 g/dL Final   • RDW 07/11/2017 14.8* 11.5 - 14.5 % Final   • RDW-SD 07/11/2017 44.9* 35.1 - 43.9 fl Final   • MPV 07/11/2017 9.3  8.0 - 12.0 fL Final   • Platelets 07/11/2017 222  150 - 450 10*3/mm3 Final   • Neutrophil % 07/11/2017 67.0  37.0 - 80.0 % Final   • Lymphocyte % 07/11/2017 19.0  10.0 - 50.0 % Final   • Monocyte % 07/11/2017 9.0  0.0 - 12.0 % Final   • Eosinophil % 07/11/2017 5.0  0.0 - 7.0 % Final   • Neutrophils Absolute 07/11/2017 5.96  2.00 - 8.60 10*3/mm3 Final   • Lymphocytes Absolute 07/11/2017 1.69  0.60 - 4.20 10*3/mm3 Final   • Monocytes Absolute 07/11/2017 0.80  0.00 - 0.90 10*3/mm3 Final   • Eosinophils Absolute 07/11/2017 0.44  0.00 - 0.70 10*3/mm3 Final   • Anisocytosis 07/11/2017 Slight/1+  None Seen Final   • WBC  Morphology 07/11/2017 Normal  Normal Final   • Platelet Estimate 07/11/2017 Adequate  Normal Final   Admission on 05/09/2017, Discharged on 05/10/2017   Component Date Value Ref Range Status   • Troponin I 05/09/2017 <0.012  <=0.034 ng/mL Final   • Troponin I 05/09/2017 <0.012  <=0.034 ng/mL Final   • Glucose 05/09/2017 119* 60 - 100 mg/dL Final   • BUN 05/09/2017 12  7 - 21 mg/dL Final   • Creatinine 05/09/2017 0.77  0.70 - 1.30 mg/dL Final   • Sodium 05/09/2017 134* 137 - 145 mmol/L Final   • Potassium 05/09/2017 3.7  3.5 - 5.1 mmol/L Final   • Chloride 05/09/2017 96  95 - 110 mmol/L Final   • CO2 05/09/2017 27.0  22.0 - 31.0 mmol/L Final   • Calcium 05/09/2017 8.7  8.4 - 10.2 mg/dL Final   • Total Protein 05/09/2017 7.8  6.3 - 8.6 g/dL Final   • Albumin 05/09/2017 4.00  3.40 - 4.80 g/dL Final   • ALT (SGPT) 05/09/2017 29  21 - 72 U/L Final   • AST (SGOT) 05/09/2017 31  17 - 59 U/L Final   • Alkaline Phosphatase 05/09/2017 76  38 - 126 U/L Final   • Total Bilirubin 05/09/2017 0.5  0.2 - 1.3 mg/dL Final   • eGFR Non African Amer 05/09/2017 113  70 - 162 mL/min/1.73 Final   • Globulin 05/09/2017 3.8* 2.3 - 3.5 gm/dL Final   • A/G Ratio 05/09/2017 1.1  1.1 - 1.8 g/dL Final   • BUN/Creatinine Ratio 05/09/2017 15.6  7.0 - 25.0 Final   • Anion Gap 05/09/2017 11.0  5.0 - 15.0 mmol/L Final   • proBNP 05/09/2017 29.7  0.0 - 450.0 pg/mL Final   • Protime 05/09/2017 13.2  11.1 - 15.3 Seconds Final   • INR 05/09/2017 1.01  0.80 - 1.20 Final   • Extra Tube 05/09/2017 hold for add-on   Final    Auto resulted   • Extra Tube 05/09/2017 Hold for add-ons.   Final    Auto resulted.   • Extra Tube 05/09/2017 hold for add-on   Final    Auto resulted   • Extra Tube 05/09/2017 Hold for add-ons.   Final    Auto resulted.   • WBC 05/09/2017 9.77  3.20 - 9.80 10*3/mm3 Final   • RBC 05/09/2017 4.48  4.37 - 5.74 10*6/mm3 Final   • Hemoglobin 05/09/2017 12.5* 13.7 - 17.3 g/dL Final   • Hematocrit 05/09/2017 36.4* 39.0 - 49.0 % Final   • MCV  05/09/2017 81.3  80.0 - 98.0 fL Final   • MCH 05/09/2017 27.9  26.5 - 34.0 pg Final   • MCHC 05/09/2017 34.3  31.5 - 36.3 g/dL Final   • RDW 05/09/2017 14.1  11.5 - 14.5 % Final   • RDW-SD 05/09/2017 41.6  35.1 - 43.9 fl Final   • MPV 05/09/2017 8.6  8.0 - 12.0 fL Final   • Platelets 05/09/2017 206  150 - 450 10*3/mm3 Final   • Neutrophil % 05/09/2017 73.3  37.0 - 80.0 % Final   • Lymphocyte % 05/09/2017 14.7  10.0 - 50.0 % Final   • Monocyte % 05/09/2017 7.5  0.0 - 12.0 % Final   • Eosinophil % 05/09/2017 3.0  0.0 - 7.0 % Final   • Basophil % 05/09/2017 0.2  0.0 - 2.0 % Final   • Immature Grans % 05/09/2017 1.3* 0.0 - 0.5 % Final   • Neutrophils, Absolute 05/09/2017 7.16  2.00 - 8.60 10*3/mm3 Final   • Lymphocytes, Absolute 05/09/2017 1.44  0.60 - 4.20 10*3/mm3 Final   • Monocytes, Absolute 05/09/2017 0.73  0.00 - 0.90 10*3/mm3 Final   • Eosinophils, Absolute 05/09/2017 0.29  0.00 - 0.70 10*3/mm3 Final   • Basophils, Absolute 05/09/2017 0.02  0.00 - 0.20 10*3/mm3 Final   • Immature Grans, Absolute 05/09/2017 0.13* 0.00 - 0.02 10*3/mm3 Final   • Troponin I 05/10/2017 <0.012  <=0.034 ng/mL Final   • Creatine Kinase 05/09/2017 200* 55 - 170 U/L Final   • Creatine Kinase 05/10/2017 180* 55 - 170 U/L Final   • CKMB 05/10/2017 1.26  0.00 - 5.00 ng/mL Final   • Color, UA 05/10/2017 Yellow  Yellow, Straw, Dark Yellow, Suni Final   • Appearance, UA 05/10/2017 Clear  Clear Final   • pH, UA 05/10/2017 6.0  5.0 - 9.0 Final   • Specific Gravity, UA 05/10/2017 1.026  1.003 - 1.030 Final   • Glucose, UA 05/10/2017 100 mg/dL (Trace)* Negative Final   • Ketones, UA 05/10/2017 Negative  Negative Final   • Bilirubin, UA 05/10/2017 Negative  Negative Final   • Blood, UA 05/10/2017 Negative  Negative Final   • Protein, UA 05/10/2017 30 mg/dL (1+)* Negative Final   • Leuk Esterase, UA 05/10/2017 Negative  Negative Final   • Nitrite, UA 05/10/2017 Negative  Negative Final   • Urobilinogen, UA 05/10/2017 0.2 E.U./dL  0.2 - 1.0 E.U./dL  Final   • Glucose 05/10/2017 167* 60 - 100 mg/dL Final   • BUN 05/10/2017 12  7 - 21 mg/dL Final   • Creatinine 05/10/2017 0.76  0.70 - 1.30 mg/dL Final   • Sodium 05/10/2017 134* 137 - 145 mmol/L Final   • Potassium 05/10/2017 3.7  3.5 - 5.1 mmol/L Final   • Chloride 05/10/2017 98  95 - 110 mmol/L Final   • CO2 05/10/2017 25.0  22.0 - 31.0 mmol/L Final   • Calcium 05/10/2017 8.4  8.4 - 10.2 mg/dL Final   • Total Protein 05/10/2017 7.2  6.3 - 8.6 g/dL Final   • Albumin 05/10/2017 3.70  3.40 - 4.80 g/dL Final   • ALT (SGPT) 05/10/2017 57  21 - 72 U/L Final   • AST (SGOT) 05/10/2017 84* 17 - 59 U/L Final   • Alkaline Phosphatase 05/10/2017 74  38 - 126 U/L Final   • Total Bilirubin 05/10/2017 0.6  0.2 - 1.3 mg/dL Final   • eGFR Non African Amer 05/10/2017 115  >60 mL/min/1.73 Final   • Globulin 05/10/2017 3.5  2.3 - 3.5 gm/dL Final   • A/G Ratio 05/10/2017 1.1  1.1 - 1.8 g/dL Final   • BUN/Creatinine Ratio 05/10/2017 15.8  7.0 - 25.0 Final   • Anion Gap 05/10/2017 11.0  5.0 - 15.0 mmol/L Final   • Magnesium 05/10/2017 1.8  1.6 - 2.3 mg/dL Final   • Color, UA 05/10/2017 Yellow  Yellow, Straw, Dark Yellow, Suni Final   • Appearance, UA 05/10/2017 Clear  Clear Final   • pH, UA 05/10/2017 6.0  5.0 - 9.0 Final   • Specific Gravity, UA 05/10/2017 1.026  1.003 - 1.030 Final   • Glucose, UA 05/10/2017 100 mg/dL (Trace)* Negative Final   • Ketones, UA 05/10/2017 Negative  Negative Final   • Bilirubin, UA 05/10/2017 Negative  Negative Final   • Blood, UA 05/10/2017 Negative  Negative Final   • Protein, UA 05/10/2017 30 mg/dL (1+)* Negative Final   • Leuk Esterase, UA 05/10/2017 Negative  Negative Final   • Nitrite, UA 05/10/2017 Negative  Negative Final   • Urobilinogen, UA 05/10/2017 0.2 E.U./dL  0.2 - 1.0 E.U./dL Final   • WBC 05/10/2017 6.96  3.20 - 9.80 10*3/mm3 Final   • RBC 05/10/2017 4.45  4.37 - 5.74 10*6/mm3 Final   • Hemoglobin 05/10/2017 12.3* 13.7 - 17.3 g/dL Final   • Hematocrit 05/10/2017 35.9* 39.0 - 49.0 %  Final   • MCV 05/10/2017 80.7  80.0 - 98.0 fL Final   • MCH 05/10/2017 27.6  26.5 - 34.0 pg Final   • MCHC 05/10/2017 34.3  31.5 - 36.3 g/dL Final   • RDW 05/10/2017 14.3  11.5 - 14.5 % Final   • RDW-SD 05/10/2017 42.0  35.1 - 43.9 fl Final   • MPV 05/10/2017 8.7  8.0 - 12.0 fL Final   • Platelets 05/10/2017 182  150 - 450 10*3/mm3 Final   • Neutrophil % 05/10/2017 69.1  37.0 - 80.0 % Final   • Lymphocyte % 05/10/2017 19.0  10.0 - 50.0 % Final   • Monocyte % 05/10/2017 6.8  0.0 - 12.0 % Final   • Eosinophil % 05/10/2017 3.4  0.0 - 7.0 % Final   • Basophil % 05/10/2017 0.1  0.0 - 2.0 % Final   • Immature Grans % 05/10/2017 1.6* 0.0 - 0.5 % Final   • Neutrophils, Absolute 05/10/2017 4.81  2.00 - 8.60 10*3/mm3 Final   • Lymphocytes, Absolute 05/10/2017 1.32  0.60 - 4.20 10*3/mm3 Final   • Monocytes, Absolute 05/10/2017 0.47  0.00 - 0.90 10*3/mm3 Final   • Eosinophils, Absolute 05/10/2017 0.24  0.00 - 0.70 10*3/mm3 Final   • Basophils, Absolute 05/10/2017 0.01  0.00 - 0.20 10*3/mm3 Final   • Immature Grans, Absolute 05/10/2017 0.11* 0.00 - 0.02 10*3/mm3 Final   • Glucose 05/10/2017 173* 70 - 130 mg/dL Final    Meter: [id number]: [id number] STEPHON TYRA LAINEZ   • RBC, UA 05/10/2017 0-2* None Seen /HPF Final   • WBC, UA 05/10/2017 0-2  None Seen, 0-2, 3-5 /HPF Final   • Bacteria, UA 05/10/2017 None Seen  None Seen /HPF Final   • Squamous Epithelial Cells, UA 05/10/2017 None Seen  None Seen, 0-2 /HPF Final   • Hyaline Casts, UA 05/10/2017 0-2  None Seen /LPF Final   • Methodology 05/10/2017 Automated Microscopy   Final   Admission on 05/03/2017, Discharged on 05/04/2017   Component Date Value Ref Range Status   • Glucose 05/03/2017 121* 60 - 100 mg/dL Final   • BUN 05/03/2017 19  7 - 21 mg/dL Final   • Creatinine 05/03/2017 0.84  0.70 - 1.30 mg/dL Final   • Sodium 05/03/2017 137  137 - 145 mmol/L Final   • Potassium 05/03/2017 4.1  3.5 - 5.1 mmol/L Final   • Chloride 05/03/2017 101  95 - 110 mmol/L Final   • CO2  05/03/2017 27.0  22.0 - 31.0 mmol/L Final   • Calcium 05/03/2017 8.5  8.4 - 10.2 mg/dL Final   • Total Protein 05/03/2017 7.0  6.3 - 8.6 g/dL Final   • Albumin 05/03/2017 3.50  3.40 - 4.80 g/dL Final   • ALT (SGPT) 05/03/2017 25  21 - 72 U/L Final   • AST (SGOT) 05/03/2017 23  17 - 59 U/L Final   • Alkaline Phosphatase 05/03/2017 65  38 - 126 U/L Final   • Total Bilirubin 05/03/2017 0.3  0.2 - 1.3 mg/dL Final   • eGFR Non African Amer 05/03/2017 102  >60 mL/min/1.73 Final   • Globulin 05/03/2017 3.5  2.3 - 3.5 gm/dL Final   • A/G Ratio 05/03/2017 1.0* 1.1 - 1.8 g/dL Final   • BUN/Creatinine Ratio 05/03/2017 22.6  7.0 - 25.0 Final   • Anion Gap 05/03/2017 9.0  5.0 - 15.0 mmol/L Final   • Protime 05/03/2017 12.8  11.1 - 15.3 Seconds Final   • INR 05/03/2017 0.97  0.80 - 1.20 Final   • PTT 05/03/2017 32.6  20.0 - 40.3 seconds Final   • Troponin I 05/03/2017 <0.012  <=0.034 ng/mL Final   • Troponin I 05/03/2017 <0.012  <=0.034 ng/mL Final   • Troponin I 05/03/2017 <0.012  <=0.034 ng/mL Final   • Creatine Kinase 05/03/2017 172* 55 - 170 U/L Final   • CKMB 05/03/2017 1.21  0.00 - 5.00 ng/mL Final   • WBC 05/03/2017 7.53  3.20 - 9.80 10*3/mm3 Final   • RBC 05/03/2017 4.42  4.37 - 5.74 10*6/mm3 Final   • Hemoglobin 05/03/2017 12.2* 13.7 - 17.3 g/dL Final   • Hematocrit 05/03/2017 36.0* 39.0 - 49.0 % Final   • MCV 05/03/2017 81.4  80.0 - 98.0 fL Final   • MCH 05/03/2017 27.6  26.5 - 34.0 pg Final   • MCHC 05/03/2017 33.9  31.5 - 36.3 g/dL Final   • RDW 05/03/2017 14.4  11.5 - 14.5 % Final   • RDW-SD 05/03/2017 42.7  35.1 - 43.9 fl Final   • MPV 05/03/2017 8.8  8.0 - 12.0 fL Final   • Platelets 05/03/2017 210  150 - 450 10*3/mm3 Final   • Neutrophil % 05/03/2017 67.3  37.0 - 80.0 % Final   • Lymphocyte % 05/03/2017 19.0  10.0 - 50.0 % Final   • Monocyte % 05/03/2017 8.0  0.0 - 12.0 % Final   • Eosinophil % 05/03/2017 3.2  0.0 - 7.0 % Final   • Basophil % 05/03/2017 0.1  0.0 - 2.0 % Final   • Immature Grans % 05/03/2017 2.4*  0.0 - 0.5 % Final   • Neutrophils, Absolute 05/03/2017 5.07  2.00 - 8.60 10*3/mm3 Final   • Lymphocytes, Absolute 05/03/2017 1.43  0.60 - 4.20 10*3/mm3 Final   • Monocytes, Absolute 05/03/2017 0.60  0.00 - 0.90 10*3/mm3 Final   • Eosinophils, Absolute 05/03/2017 0.24  0.00 - 0.70 10*3/mm3 Final   • Basophils, Absolute 05/03/2017 0.01  0.00 - 0.20 10*3/mm3 Final   • Immature Grans, Absolute 05/03/2017 0.18* 0.00 - 0.02 10*3/mm3 Final   • proBNP 05/03/2017 120.0  0.0 - 450.0 pg/mL Final   • D-Dimer, Quantitative 05/03/2017 306  0 - 470 ng/mL (FEU) Final   • Site 05/03/2017    Final    Not performed at this site.   • Cory's Test 05/03/2017    Final    Not performed at this site.   • pH, Arterial 05/03/2017 7.401  7.350 - 7.450 pH units Final   • pCO2, Arterial 05/03/2017 43.9  35.0 - 45.0 mm Hg Final   • pO2, Arterial 05/03/2017 103.4  80.0 - 105.0 mm Hg Final   • HCO3, Arterial 05/03/2017 26.6* 22.0 - 26.0 mmol/L Final   • Base Excess, Arterial 05/03/2017 1.5  -2.4 - 2.4 mmol/L Final   • O2 Saturation, Arterial 05/03/2017 97.6  94.0 - 100.0 % Final   • Hemoglobin, Blood Gas 05/03/2017 12.6* 14 - 18 g/dL Final   • Hematocrit, Blood Gas 05/03/2017 37.0  40.0 - 54.0 % Final   • CO2 Content 05/03/2017 28.0* 23 - 27 Final   • Sodium, Arterial 05/03/2017 136.4* 138 - 146 mmol/L Final   • Potassium, Arterial 05/03/2017 3.97  3.6 - 4.9 mmol/L Final   • Glucose, Arterial 05/03/2017 125  mmol/L Final   • Barometric Pressure for Blood Gas 05/03/2017   mmHg Final    Not performed at this site.   • Modality 05/03/2017    Final    Not performed at this site.   • Ionized Calcium 05/03/2017 4.7  4.5 - 4.9 mg/dL Final   • Extra Tube 05/03/2017 hold for add-on   Final    Auto resulted   • Glucose 05/03/2017 145* 70 - 130 mg/dL Final    RN NotifiedMeter: [id number]: [id number] ELBA PETERSEN   • Glucose 05/03/2017 215* 70 - 130 mg/dL Final    RN NotifiedMeter: [id number]: [id number] ELBA PETERSEN   • Glucose  05/03/2017 174* 70 - 130 mg/dL Final    RN NotifiedMeter: [id number]: [id number] JUVE ROCKWELL   • Glucose 05/04/2017 144* 70 - 130 mg/dL Final    RN NotifiedMeter: [id number]: [id number] JUVE ROCKWELL   • Glucose 05/04/2017 207* 70 - 130 mg/dL Final    RN NotifiedMeter: [id number]: [id number] CAROLYNE GARNER   Admission on 04/30/2017, Discharged on 05/01/2017   Component Date Value Ref Range Status   • Glucose 04/30/2017 176* 60 - 100 mg/dL Final   • BUN 04/30/2017 9  7 - 21 mg/dL Final   • Creatinine 04/30/2017 0.79  0.70 - 1.30 mg/dL Final   • Sodium 04/30/2017 136* 137 - 145 mmol/L Final   • Potassium 04/30/2017 4.0  3.5 - 5.1 mmol/L Final   • Chloride 04/30/2017 97  95 - 110 mmol/L Final   • CO2 04/30/2017 27.0  22.0 - 31.0 mmol/L Final   • Calcium 04/30/2017 8.8  8.4 - 10.2 mg/dL Final   • Total Protein 04/30/2017 7.7  6.3 - 8.6 g/dL Final   • Albumin 04/30/2017 3.90  3.40 - 4.80 g/dL Final   • ALT (SGPT) 04/30/2017 33  21 - 72 U/L Final   • AST (SGOT) 04/30/2017 33  17 - 59 U/L Final   • Alkaline Phosphatase 04/30/2017 74  38 - 126 U/L Final   • Total Bilirubin 04/30/2017 0.6  0.2 - 1.3 mg/dL Final   • eGFR Non African Amer 04/30/2017 110  70 - 162 mL/min/1.73 Final   • Globulin 04/30/2017 3.8* 2.3 - 3.5 gm/dL Final   • A/G Ratio 04/30/2017 1.0* 1.1 - 1.8 g/dL Final   • BUN/Creatinine Ratio 04/30/2017 11.4  7.0 - 25.0 Final   • Anion Gap 04/30/2017 12.0  5.0 - 15.0 mmol/L Final   • Creatine Kinase 04/30/2017 213* 55 - 170 U/L Final   • CKMB 04/30/2017 2.00  0.00 - 5.00 ng/mL Final   • Troponin I 04/30/2017 <0.012  <=0.034 ng/mL Final   • PTT 04/30/2017 43.9* 20.0 - 40.3 seconds Final   • Protime 04/30/2017 18.1* 11.1 - 15.3 Seconds Final   • INR 04/30/2017 1.50* 0.80 - 1.20 Final   • WBC 04/30/2017 7.86  3.20 - 9.80 10*3/mm3 Final   • RBC 04/30/2017 4.59  4.37 - 5.74 10*6/mm3 Final   • Hemoglobin 04/30/2017 12.9* 13.7 - 17.3 g/dL Final   • Hematocrit 04/30/2017 37.3* 39.0 -  49.0 % Final   • MCV 04/30/2017 81.3  80.0 - 98.0 fL Final   • MCH 04/30/2017 28.1  26.5 - 34.0 pg Final   • MCHC 04/30/2017 34.6  31.5 - 36.3 g/dL Final   • RDW 04/30/2017 14.6* 11.5 - 14.5 % Final   • RDW-SD 04/30/2017 43.4  35.1 - 43.9 fl Final   • MPV 04/30/2017 8.8  8.0 - 12.0 fL Final   • Platelets 04/30/2017 190  150 - 450 10*3/mm3 Final   • Neutrophil % 04/30/2017 71.7  37.0 - 80.0 % Final   • Lymphocyte % 04/30/2017 15.5  10.0 - 50.0 % Final   • Monocyte % 04/30/2017 7.3  0.0 - 12.0 % Final   • Eosinophil % 04/30/2017 3.3  0.0 - 7.0 % Final   • Basophil % 04/30/2017 0.3  0.0 - 2.0 % Final   • Immature Grans % 04/30/2017 1.9* 0.0 - 0.5 % Final   • Neutrophils, Absolute 04/30/2017 5.64  2.00 - 8.60 10*3/mm3 Final   • Lymphocytes, Absolute 04/30/2017 1.22  0.60 - 4.20 10*3/mm3 Final   • Monocytes, Absolute 04/30/2017 0.57  0.00 - 0.90 10*3/mm3 Final   • Eosinophils, Absolute 04/30/2017 0.26  0.00 - 0.70 10*3/mm3 Final   • Basophils, Absolute 04/30/2017 0.02  0.00 - 0.20 10*3/mm3 Final   • Immature Grans, Absolute 04/30/2017 0.15* 0.00 - 0.02 10*3/mm3 Final   • Site 04/30/2017    Final    Not performed at this site.   • Cory's Test 04/30/2017    Final    Not performed at this site.   • pH, Arterial 04/30/2017 7.387  7.350 - 7.450 pH units Final   • pCO2, Arterial 04/30/2017 43.5  35.0 - 45.0 mm Hg Final   • pO2, Arterial 04/30/2017 83.5  80.0 - 105.0 mm Hg Final   • HCO3, Arterial 04/30/2017 25.6  22.0 - 26.0 mmol/L Final   • Base Excess, Arterial 04/30/2017 0.3  -2.4 - 2.4 mmol/L Final   • O2 Saturation, Arterial 04/30/2017 95.9  % Final   • Hemoglobin, Blood Gas 04/30/2017 13.5* 14 - 18 g/dL Final   • Hematocrit, Blood Gas 04/30/2017 40.0  40.0 - 54.0 % Final   • CO2 Content 04/30/2017 26.9  23 - 27 Final   • Sodium, Arterial 04/30/2017 135.5* 138 - 146 mmol/L Final   • Potassium, Arterial 04/30/2017 4.02  3.6 - 4.9 mmol/L Final   • Glucose, Arterial 04/30/2017 147  mmol/L Final   • Barometric Pressure for  Blood Gas 04/30/2017   mmHg Final    Not performed at this site.   • Modality 04/30/2017    Final    Not performed at this site.   • Ionized Calcium 04/30/2017 4.7  4.5 - 4.9 mg/dL Final   • Extra Tube 04/30/2017 Hold for add-ons.   Final    Auto resulted.   • Troponin I 04/30/2017 <0.012  <=0.034 ng/mL Final   • Troponin I 04/30/2017 <0.012  <=0.034 ng/mL Final   • Glucose 04/30/2017 187* 70 - 130 mg/dL Final    Sliding Scale AdminMeter: [id number]: [id number] LISA KRUGER   • Glucose 04/30/2017 155* 70 - 130 mg/dL Final    RN NotifiedMeter: [id number]: [id number] MOHAN OLIVERA   • Troponin I 05/01/2017 <0.012  <=0.034 ng/mL Final   • Glucose 05/01/2017 195* 70 - 130 mg/dL Final    RN NotifiedMeter: [id number]: [id number] MOHAN OLIVERA   • WBC 05/01/2017 8.30  3.20 - 9.80 10*3/mm3 Final   • RBC 05/01/2017 4.25* 4.37 - 5.74 10*6/mm3 Final   • Hemoglobin 05/01/2017 11.8* 13.7 - 17.3 g/dL Final   • Hematocrit 05/01/2017 34.5* 39.0 - 49.0 % Final   • MCV 05/01/2017 81.2  80.0 - 98.0 fL Final   • MCH 05/01/2017 27.8  26.5 - 34.0 pg Final   • MCHC 05/01/2017 34.2  31.5 - 36.3 g/dL Final   • RDW 05/01/2017 14.7* 11.5 - 14.5 % Final   • RDW-SD 05/01/2017 43.5  35.1 - 43.9 fl Final   • MPV 05/01/2017 8.6  8.0 - 12.0 fL Final   • Platelets 05/01/2017 192  150 - 450 10*3/mm3 Final   • Glucose 05/01/2017 183* 60 - 100 mg/dL Final   • BUN 05/01/2017 13  7 - 21 mg/dL Final   • Creatinine 05/01/2017 1.03  0.70 - 1.30 mg/dL Final   • Sodium 05/01/2017 131* 137 - 145 mmol/L Final   • Potassium 05/01/2017 4.1  3.5 - 5.1 mmol/L Final   • Chloride 05/01/2017 94* 95 - 110 mmol/L Final   • CO2 05/01/2017 28.0  22.0 - 31.0 mmol/L Final   • Calcium 05/01/2017 8.4  8.4 - 10.2 mg/dL Final   • eGFR Non African Amer 05/01/2017 81  >60 mL/min/1.73 Final   • BUN/Creatinine Ratio 05/01/2017 12.6  7.0 - 25.0 Final   • Anion Gap 05/01/2017 9.0  5.0 - 15.0 mmol/L Final   • Glucose 05/01/2017 152* 70 - 130 mg/dL Final     RN NotifiedMeter: [id number]: [id number] ZAY CH   Admission on 04/22/2017, Discharged on 04/25/2017   No results displayed because visit has over 200 results.      Anticoagulation Visit on 04/19/2017   Component Date Value Ref Range Status   • INR 04/19/2017 2.10* 0.9 - 1.1 Final     Labs have been independently reviewed    Key Imaging/Tracings/POC Testing    Assessment and Medications  Problems Addressed this Visit        Cardiovascular and Mediastinum    Hyperlipidemia (Chronic)    Coronary arteriosclerosis in native artery (Chronic)    Essential hypertension       Digestive    Morbid obesity (Chronic)    Relevant Orders    Ambulatory Referral to Bariatric Surgery (Completed)       Endocrine    Type 2 diabetes mellitus - Primary (Chronic)    Relevant Medications    Canagliflozin (INVOKANA) 300 MG tablet    Other Relevant Orders    Hemoglobin A1c    Basic Metabolic Panel    Hemoglobin A1c    LDL Cholesterol, Direct    CBC & Differential    Comprehensive Metabolic Panel    Microalbumin / Creatinine Urine Ratio    TSH    Vitamin B12       Nervous and Auditory    Chronic back pain (Chronic)    Relevant Medications    TiZANidine (ZANAFLEX) 2 MG capsule        Side effects of ordered medications discussed with patient.     Plan/Additional Notes/Instructions  Plan   1. Increase invokana to 300 mg daily   2. Trigs should decrease with better FBG control  3. Repeat/spot check check BMP and A1c in 3 months  4. Refer to bariatric surgery for consult.   5. Although he has been cleared by cardiology to begin phentermine, I do not feel patient is a good phentermine candidate secondary to cardiovascular history and angina. Multiple ER visits for angina. Do not think client will be compliant on this therapy  6. Strongly suggested bariatric referral   7. Educated patient importance of weight loss    Follow-Up  Return in about 6 months (around 1/17/2018), or if symptoms worsen or fail to improve, for With full  labs.    Patient/caregiver verbalizes understanding of all orders and instructions in this plan of care.       This document has been electronically signed by LALA Barajas on July 17, 2017 10:50 AM

## 2017-07-17 NOTE — PATIENT INSTRUCTIONS
Hypertension  Hypertension, commonly called high blood pressure, is when the force of blood pumping through your arteries is too strong. Your arteries are the blood vessels that carry blood from your heart throughout your body. A blood pressure reading consists of a higher number over a lower number, such as 110/72. The higher number (systolic) is the pressure inside your arteries when your heart pumps. The lower number (diastolic) is the pressure inside your arteries when your heart relaxes. Ideally you want your blood pressure below 120/80.  Hypertension forces your heart to work harder to pump blood. Your arteries may become narrow or stiff. Having untreated or uncontrolled hypertension can cause heart attack, stroke, kidney disease, and other problems.  RISK FACTORS  Some risk factors for high blood pressure are controllable. Others are not.   Risk factors you cannot control include:   · Race. You may be at higher risk if you are .  · Age. Risk increases with age.  · Gender. Men are at higher risk than women before age 45 years. After age 65, women are at higher risk than men.  Risk factors you can control include:  · Not getting enough exercise or physical activity.  · Being overweight.  · Getting too much fat, sugar, calories, or salt in your diet.  · Drinking too much alcohol.  SIGNS AND SYMPTOMS  Hypertension does not usually cause signs or symptoms. Extremely high blood pressure (hypertensive crisis) may cause headache, anxiety, shortness of breath, and nosebleed.  DIAGNOSIS  To check if you have hypertension, your health care provider will measure your blood pressure while you are seated, with your arm held at the level of your heart. It should be measured at least twice using the same arm. Certain conditions can cause a difference in blood pressure between your right and left arms. A blood pressure reading that is higher than normal on one occasion does not mean that you need treatment. If  it is not clear whether you have high blood pressure, you may be asked to return on a different day to have your blood pressure checked again. Or, you may be asked to monitor your blood pressure at home for 1 or more weeks.  TREATMENT  Treating high blood pressure includes making lifestyle changes and possibly taking medicine. Living a healthy lifestyle can help lower high blood pressure. You may need to change some of your habits.  Lifestyle changes may include:  · Following the DASH diet. This diet is high in fruits, vegetables, and whole grains. It is low in salt, red meat, and added sugars.  · Keep your sodium intake below 2,300 mg per day.  · Getting at least 30-45 minutes of aerobic exercise at least 4 times per week.  · Losing weight if necessary.  · Not smoking.  · Limiting alcoholic beverages.  · Learning ways to reduce stress.  Your health care provider may prescribe medicine if lifestyle changes are not enough to get your blood pressure under control, and if one of the following is true:  · You are 18-59 years of age and your systolic blood pressure is above 140.  · You are 60 years of age or older, and your systolic blood pressure is above 150.  · Your diastolic blood pressure is above 90.  · You have diabetes, and your systolic blood pressure is over 140 or your diastolic blood pressure is over 90.  · You have kidney disease and your blood pressure is above 140/90.  · You have heart disease and your blood pressure is above 140/90.  Your personal target blood pressure may vary depending on your medical conditions, your age, and other factors.  HOME CARE INSTRUCTIONS  · Have your blood pressure rechecked as directed by your health care provider.    · Take medicines only as directed by your health care provider. Follow the directions carefully. Blood pressure medicines must be taken as prescribed. The medicine does not work as well when you skip doses. Skipping doses also puts you at risk for  "problems.  · Do not smoke.    · Monitor your blood pressure at home as directed by your health care provider.   SEEK MEDICAL CARE IF:   · You think you are having a reaction to medicines taken.  · You have recurrent headaches or feel dizzy.  · You have swelling in your ankles.  · You have trouble with your vision.  SEEK IMMEDIATE MEDICAL CARE IF:  · You develop a severe headache or confusion.  · You have unusual weakness, numbness, or feel faint.  · You have severe chest or abdominal pain.  · You vomit repeatedly.  · You have trouble breathing.  MAKE SURE YOU:   · Understand these instructions.  · Will watch your condition.  · Will get help right away if you are not doing well or get worse.     This information is not intended to replace advice given to you by your health care provider. Make sure you discuss any questions you have with your health care provider.     Document Released: 12/18/2006 Document Revised: 05/03/2016 Document Reviewed: 10/10/2014  [employer] Interactive Patient Education ©2017 [employer]  Food Choices to Lower Your Triglycerides  Triglycerides are a type of fat in your blood. High levels of triglycerides can increase the risk of heart disease and stroke. If your triglyceride levels are high, the foods you eat and your eating habits are very important. Choosing the right foods can help lower your triglycerides.   WHAT GENERAL GUIDELINES DO I NEED TO FOLLOW?  · Lose weight if you are overweight.    · Limit or avoid alcohol.    · Fill one half of your plate with vegetables and green salads.    · Limit fruit to two servings a day. Choose fruit instead of juice.    · Make one fourth of your plate whole grains. Look for the word \"whole\" as the first word in the ingredient list.  · Fill one fourth of your plate with lean protein foods.  · Enjoy fatty fish (such as salmon, mackerel, sardines, and tuna) three times a week.    · Choose healthy fats.    · Limit foods high in starch and sugar.  · Eat " more home-cooked food and less restaurant, buffet, and fast food.  · Limit fried foods.  · Cook foods using methods other than frying.  · Limit saturated fats.  · Check ingredient lists to avoid foods with partially hydrogenated oils (trans fats) in them.  WHAT FOODS CAN I EAT?   Grains  Whole grains, such as whole wheat or whole grain breads, crackers, cereals, and pasta. Unsweetened oatmeal, bulgur, barley, quinoa, or brown rice. Corn or whole wheat flour tortillas.   Vegetables  Fresh or frozen vegetables (raw, steamed, roasted, or grilled). Green salads.  Fruits  All fresh, canned (in natural juice), or frozen fruits.  Meat and Other Protein Products  Ground beef (85% or leaner), grass-fed beef, or beef trimmed of fat. Skinless chicken or turkey. Ground chicken or turkey. Pork trimmed of fat. All fish and seafood. Eggs. Dried beans, peas, or lentils. Unsalted nuts or seeds. Unsalted canned or dry beans.  Dairy  Low-fat dairy products, such as skim or 1% milk, 2% or reduced-fat cheeses, low-fat ricotta or cottage cheese, or plain low-fat yogurt.  Fats and Oils  Tub margarines without trans fats. Light or reduced-fat mayonnaise and salad dressings. Avocado. Safflower, olive, or canola oils. Natural peanut or almond butter.  The items listed above may not be a complete list of recommended foods or beverages. Contact your dietitian for more options.  WHAT FOODS ARE NOT RECOMMENDED?   Grains  White bread. White pasta. White rice. Cornbread. Bagels, pastries, and croissants. Crackers that contain trans fat.  Vegetables  White potatoes. Corn. Creamed or fried vegetables. Vegetables in a cheese sauce.  Fruits  Dried fruits. Canned fruit in light or heavy syrup. Fruit juice.  Meat and Other Protein Products  Fatty cuts of meat. Ribs, chicken wings, alvarez, sausage, bologna, salami, chitterlings, fatback, hot dogs, bratwurst, and packaged luncheon meats.  Dairy  Whole or 2% milk, cream, half-and-half, and cream cheese.  Whole-fat or sweetened yogurt. Full-fat cheeses. Nondairy creamers and whipped toppings. Processed cheese, cheese spreads, or cheese curds.  Sweets and Desserts  Corn syrup, sugars, honey, and molasses. Candy. Jam and jelly. Syrup. Sweetened cereals. Cookies, pies, cakes, donuts, muffins, and ice cream.  Fats and Oils  Butter, stick margarine, lard, shortening, ghee, or alvarez fat. Coconut, palm kernel, or palm oils.  Beverages  Alcohol. Sweetened drinks (such as sodas, lemonade, and fruit drinks or punches).  The items listed above may not be a complete list of foods and beverages to avoid. Contact your dietitian for more information.     This information is not intended to replace advice given to you by your health care provider. Make sure you discuss any questions you have with your health care provider.     Document Released: 10/05/2005 Document Revised: 01/08/2016 Document Reviewed: 10/22/2014  [employer] Interactive Patient Education ©2017 [employer]  Type 2 Diabetes Mellitus, Diagnosis, Adult  Type 2 diabetes (type 2 diabetes mellitus) is a long-term (chronic) disease. In type 2 diabetes, one or both of these problems may be present:  · The pancreas does not make enough of a hormone called insulin.  · Cells in the body do not respond properly to insulin that the body makes (insulin resistance).  Normally, insulin allows blood sugar (glucose) to enter cells in the body. The cells use glucose for energy. Insulin resistance or lack of insulin causes excess glucose to build up in the blood instead of going into cells. As a result, high blood glucose (hyperglycemia) develops.  WHAT INCREASES THE RISK?  The following factors may make you more likely to develop type 2 diabetes:  · Having a family member with type 2 diabetes.  · Being overweight or obese.  · Having an inactive (sedentary) lifestyle.  · Having been diagnosed with insulin resistance.  · Having a history of prediabetes, gestational diabetes, or  polycystic ovarian syndrome (PCOS).  · Being of American-[place], -American, /, or / descent.  WHAT ARE THE SIGNS OR SYMPTOMS?  In the early stage of this condition, you may not have symptoms. Symptoms develop slowly and may include:  · Increased thirst (polydipsia).  · Increased hunger (polyphagia).  · Increased urination (polyuria).  · Increased urination during the night (nocturia).  · Unexplained weight loss.  · Frequent infections that keep coming back (recurring).  · Fatigue.  · Weakness.  · Vision changes, such as blurry vision.  · Cuts or bruises that are slow to heal.  · Tingling or numbness in the hands or feet.  · Dark patches on the skin (acanthosis nigricans).  HOW IS THIS DIAGNOSED?  This condition is diagnosed based on your symptoms, your medical history, a physical exam, and your blood glucose level. Your blood glucose may be checked with one or more of the following blood tests:  · A fasting blood glucose (FBG) test. You will not be allowed to eat (you will fast) for at least 8 hours before a blood sample is taken.  · A random blood glucose test. This checks blood glucose at any time of day regardless of when you ate.  · An A1c (hemoglobin A1c) blood test. This provides information about blood glucose control over the previous 2-3 months.  · An oral glucose tolerance test (OGTT ). This measures your blood glucose at two times:    After fasting. This is your baseline blood glucose level.    Two hours after drinking a beverage that contains glucose.  You may be diagnosed with type 2 diabetes if:  · Your FBG level is 126 mg/dL (7.0 mmol/L) or higher.  · Your random blood glucose level is 200 mg/dL (11.1 mmol/L) or higher.  · Your A1c level is 6.5% or higher.  · Your OGGT result is higher than 200 mg/dL (11.1 mmol/L).  These blood tests may be repeated to confirm your diagnosis.  HOW IS THIS TREATED?  Your treatment may be managed by a specialist called an  endocrinologist. Type 2 diabetes may be treated by following instructions from your health care provider about:  · Making diet and lifestyle changes. This may include:    Following an individualized nutrition plan that is developed by a diet and nutrition specialist (registered dietitian).    Exercising regularly.    Finding ways to manage stress.  · Checking your blood glucose level as often as directed.  · Taking diabetes medicines or insulin daily. This helps to keep your blood glucose levels in the healthy range.    If you use insulin, you may need to adjust the dosage depending on how physically active you are and what foods you eat. Your health care provider will tell you how to adjust your dosage.  · Taking medicines to help prevent complications from diabetes, such as:    Aspirin.    Medicine to lower cholesterol.    Medicine to control blood pressure.  Your health care provider will set individualized treatment goals for you. Your goals will be based on your age, other medical conditions you have, and how you respond to diabetes treatment. Generally, the goal of treatment is to maintain the following blood glucose levels:  · Before meals (preprandial):  mg/dL (4.4-7.2 mmol/L).  · After meals (postprandial): below 180 mg/dL (10 mmol/L).  · A1c level: less than 7%.  FOLLOW THESE INSTRUCTIONS AT HOME:  Questions to Ask Your Health Care Provider  Consider asking the following questions:  · Do I need to meet with a diabetes educator?  · Where can I find a support group for people with diabetes?  · What equipment will I need to manage my diabetes at home?  · What diabetes medicines do I need, and when should I take them?  · How often do I need to check my blood glucose?  · What number can I call if I have questions?  · When is my next appointment?  General Instructions  · Take over-the-counter and prescription medicines only as told by your health care provider.  · Keep all follow-up visits as told by your  health care provider. This is important.  · For more information about diabetes, visit:    American Diabetes Association (ADA): www.diabetes.org    American Association of Diabetes Educators (AADE): www.diabeteseducator.org/patient-resources  CONTACT A HEALTH CARE PROVIDER IF:  · Your blood glucose is at or above 240 mg/dL (13.3 mmol/L) for 2 days in a row.  · You have been sick or have had a fever for 2 days or longer and you are not getting better.  · You have any of the following problems for more than 6 hours:    You cannot eat or drink.    You have nausea and vomiting.    You have diarrhea.  GET HELP RIGHT AWAY IF:  · Your blood glucose is lower than 54 mg/dL (3.0 mmol/L).  · You become confused or you have trouble thinking clearly.  · You have difficulty breathing.  · You have moderate or large ketone levels in your urine.     This information is not intended to replace advice given to you by your health care provider. Make sure you discuss any questions you have with your health care provider.     Document Released: 12/18/2006 Document Revised: 04/10/2017 Document Reviewed: 01/20/2017  [employer] Interactive Patient Education ©2017 [employer]

## 2017-07-20 PROCEDURE — 93005 ELECTROCARDIOGRAM TRACING: CPT | Performed by: NURSE PRACTITIONER

## 2017-07-23 ENCOUNTER — HOSPITAL ENCOUNTER (OUTPATIENT)
Facility: HOSPITAL | Age: 37
Setting detail: OBSERVATION
Discharge: HOME | End: 2017-07-25
Attending: FAMILY MEDICINE | Admitting: FAMILY MEDICINE

## 2017-07-23 LAB
ALBUMIN SERPL-MCNC: 3.6 G/DL (ref 3.4–4.8)
ALBUMIN/GLOB SERPL: 1 G/DL (ref 1.1–1.8)
ALP SERPL-CCNC: 81 U/L (ref 38–126)
ALT SERPL W P-5'-P-CCNC: 32 U/L (ref 21–72)
ANION GAP SERPL CALCULATED.3IONS-SCNC: 11 MMOL/L (ref 5–15)
AST SERPL-CCNC: 19 U/L (ref 17–59)
BASOPHILS # BLD AUTO: 0.01 10*3/MM3 (ref 0–0.2)
BASOPHILS NFR BLD AUTO: 0.1 % (ref 0–2)
BILIRUB SERPL-MCNC: 0.4 MG/DL (ref 0.2–1.3)
BUN BLD-MCNC: 11 MG/DL (ref 7–21)
BUN/CREAT SERPL: 15.1 (ref 7–25)
CALCIUM SPEC-SCNC: 8.7 MG/DL (ref 8.4–10.2)
CHLORIDE SERPL-SCNC: 101 MMOL/L (ref 95–110)
CO2 SERPL-SCNC: 23 MMOL/L (ref 22–31)
CREAT BLD-MCNC: 0.73 MG/DL (ref 0.7–1.3)
DEPRECATED RDW RBC AUTO: 43.2 FL (ref 35.1–43.9)
EOSINOPHIL # BLD AUTO: 0.23 10*3/MM3 (ref 0–0.7)
EOSINOPHIL NFR BLD AUTO: 2.7 % (ref 0–7)
ERYTHROCYTE [DISTWIDTH] IN BLOOD BY AUTOMATED COUNT: 14.7 % (ref 11.5–14.5)
GFR SERPL CREATININE-BSD FRML MDRD: 120 ML/MIN/1.73 (ref 70–162)
GLOBULIN UR ELPH-MCNC: 3.7 GM/DL (ref 2.3–3.5)
GLUCOSE BLD-MCNC: 123 MG/DL (ref 60–100)
GLUCOSE BLDC GLUCOMTR-MCNC: 125 MG/DL (ref 70–130)
HCT VFR BLD AUTO: 37.8 % (ref 39–49)
HGB BLD-MCNC: 12.9 G/DL (ref 13.7–17.3)
HOLD SPECIMEN: NORMAL
IMM GRANULOCYTES # BLD: 0.09 10*3/MM3 (ref 0–0.02)
IMM GRANULOCYTES NFR BLD: 1.1 % (ref 0–0.5)
LYMPHOCYTES # BLD AUTO: 1.9 10*3/MM3 (ref 0.6–4.2)
LYMPHOCYTES NFR BLD AUTO: 22.4 % (ref 10–50)
MCH RBC QN AUTO: 27.6 PG (ref 26.5–34)
MCHC RBC AUTO-ENTMCNC: 34.1 G/DL (ref 31.5–36.3)
MCV RBC AUTO: 80.8 FL (ref 80–98)
MONOCYTES # BLD AUTO: 0.8 10*3/MM3 (ref 0–0.9)
MONOCYTES NFR BLD AUTO: 9.4 % (ref 0–12)
NEUTROPHILS # BLD AUTO: 5.45 10*3/MM3 (ref 2–8.6)
NEUTROPHILS NFR BLD AUTO: 64.3 % (ref 37–80)
PLATELET # BLD AUTO: 184 10*3/MM3 (ref 150–450)
PMV BLD AUTO: 8.9 FL (ref 8–12)
POTASSIUM BLD-SCNC: 4 MMOL/L (ref 3.5–5.1)
PROT SERPL-MCNC: 7.3 G/DL (ref 6.3–8.6)
RBC # BLD AUTO: 4.68 10*6/MM3 (ref 4.37–5.74)
SODIUM BLD-SCNC: 135 MMOL/L (ref 137–145)
TROPONIN I SERPL-MCNC: <0.012 NG/ML
WBC NRBC COR # BLD: 8.48 10*3/MM3 (ref 3.2–9.8)
WHOLE BLOOD HOLD SPECIMEN: NORMAL

## 2017-07-23 PROCEDURE — 85025 COMPLETE CBC W/AUTO DIFF WBC: CPT | Performed by: NURSE PRACTITIONER

## 2017-07-23 PROCEDURE — 82962 GLUCOSE BLOOD TEST: CPT

## 2017-07-23 PROCEDURE — 84484 ASSAY OF TROPONIN QUANT: CPT | Performed by: NURSE PRACTITIONER

## 2017-07-23 PROCEDURE — G0378 HOSPITAL OBSERVATION PER HR: HCPCS

## 2017-07-23 PROCEDURE — 80053 COMPREHEN METABOLIC PANEL: CPT | Performed by: NURSE PRACTITIONER

## 2017-07-23 PROCEDURE — 94799 UNLISTED PULMONARY SVC/PX: CPT

## 2017-07-23 PROCEDURE — 94760 N-INVAS EAR/PLS OXIMETRY 1: CPT

## 2017-07-23 PROCEDURE — G0379 DIRECT REFER HOSPITAL OBSERV: HCPCS

## 2017-07-23 PROCEDURE — 96374 THER/PROPH/DIAG INJ IV PUSH: CPT

## 2017-07-23 PROCEDURE — 25010000002 MORPHINE SULFATE (PF) 2 MG/ML SOLUTION: Performed by: FAMILY MEDICINE

## 2017-07-23 RX ORDER — ASPIRIN 81 MG/1
324 TABLET, CHEWABLE ORAL ONCE
Status: DISCONTINUED | OUTPATIENT
Start: 2017-07-23 | End: 2017-07-25 | Stop reason: HOSPADM

## 2017-07-23 RX ORDER — ACETAMINOPHEN 325 MG/1
650 TABLET ORAL EVERY 4 HOURS PRN
Status: DISCONTINUED | OUTPATIENT
Start: 2017-07-23 | End: 2017-07-25 | Stop reason: HOSPADM

## 2017-07-23 RX ORDER — SERTRALINE HYDROCHLORIDE 25 MG/1
25 TABLET, FILM COATED ORAL NIGHTLY
Status: DISCONTINUED | OUTPATIENT
Start: 2017-07-23 | End: 2017-07-25 | Stop reason: HOSPADM

## 2017-07-23 RX ORDER — ATORVASTATIN CALCIUM 20 MG/1
20 TABLET, FILM COATED ORAL NIGHTLY
Status: DISCONTINUED | OUTPATIENT
Start: 2017-07-23 | End: 2017-07-25 | Stop reason: HOSPADM

## 2017-07-23 RX ORDER — AMLODIPINE BESYLATE 10 MG/1
10 TABLET ORAL NIGHTLY
Status: DISCONTINUED | OUTPATIENT
Start: 2017-07-23 | End: 2017-07-25 | Stop reason: HOSPADM

## 2017-07-23 RX ORDER — CLOPIDOGREL BISULFATE 75 MG/1
75 TABLET ORAL NIGHTLY
Status: DISCONTINUED | OUTPATIENT
Start: 2017-07-23 | End: 2017-07-24

## 2017-07-23 RX ORDER — IPRATROPIUM BROMIDE AND ALBUTEROL SULFATE 2.5; .5 MG/3ML; MG/3ML
3 SOLUTION RESPIRATORY (INHALATION)
Status: DISCONTINUED | OUTPATIENT
Start: 2017-07-23 | End: 2017-07-25 | Stop reason: HOSPADM

## 2017-07-23 RX ORDER — TIZANIDINE 4 MG/1
4 TABLET ORAL EVERY 8 HOURS PRN
Status: DISCONTINUED | OUTPATIENT
Start: 2017-07-23 | End: 2017-07-25 | Stop reason: HOSPADM

## 2017-07-23 RX ORDER — MORPHINE SULFATE 2 MG/ML
1 INJECTION, SOLUTION INTRAMUSCULAR; INTRAVENOUS EVERY 4 HOURS PRN
Status: DISCONTINUED | OUTPATIENT
Start: 2017-07-23 | End: 2017-07-25 | Stop reason: HOSPADM

## 2017-07-23 RX ORDER — METOPROLOL SUCCINATE 100 MG/1
100 TABLET, EXTENDED RELEASE ORAL NIGHTLY
Status: DISCONTINUED | OUTPATIENT
Start: 2017-07-23 | End: 2017-07-25 | Stop reason: HOSPADM

## 2017-07-23 RX ORDER — LISINOPRIL 40 MG/1
40 TABLET ORAL EVERY MORNING
Status: DISCONTINUED | OUTPATIENT
Start: 2017-07-24 | End: 2017-07-25 | Stop reason: HOSPADM

## 2017-07-23 RX ORDER — SODIUM CHLORIDE 0.9 % (FLUSH) 0.9 %
1-10 SYRINGE (ML) INJECTION AS NEEDED
Status: DISCONTINUED | OUTPATIENT
Start: 2017-07-23 | End: 2017-07-25 | Stop reason: HOSPADM

## 2017-07-23 RX ORDER — TRAZODONE HYDROCHLORIDE 150 MG/1
300 TABLET ORAL NIGHTLY
Status: DISCONTINUED | OUTPATIENT
Start: 2017-07-23 | End: 2017-07-25 | Stop reason: HOSPADM

## 2017-07-23 RX ORDER — RANOLAZINE 500 MG/1
1000 TABLET, EXTENDED RELEASE ORAL EVERY 12 HOURS SCHEDULED
Status: DISCONTINUED | OUTPATIENT
Start: 2017-07-23 | End: 2017-07-25 | Stop reason: HOSPADM

## 2017-07-23 RX ORDER — ASPIRIN 81 MG/1
81 TABLET ORAL DAILY
Status: DISCONTINUED | OUTPATIENT
Start: 2017-07-24 | End: 2017-07-25 | Stop reason: HOSPADM

## 2017-07-23 RX ORDER — PRAMIPEXOLE DIHYDROCHLORIDE 1 MG/1
2 TABLET ORAL NIGHTLY
Status: DISCONTINUED | OUTPATIENT
Start: 2017-07-23 | End: 2017-07-25 | Stop reason: HOSPADM

## 2017-07-23 RX ORDER — NICOTINE POLACRILEX 4 MG
15 LOZENGE BUCCAL
Status: DISCONTINUED | OUTPATIENT
Start: 2017-07-23 | End: 2017-07-25 | Stop reason: HOSPADM

## 2017-07-23 RX ORDER — ISOSORBIDE MONONITRATE 60 MG/1
120 TABLET, EXTENDED RELEASE ORAL EVERY MORNING
Status: DISCONTINUED | OUTPATIENT
Start: 2017-07-24 | End: 2017-07-25 | Stop reason: HOSPADM

## 2017-07-23 RX ORDER — DEXTROSE MONOHYDRATE 25 G/50ML
25 INJECTION, SOLUTION INTRAVENOUS
Status: DISCONTINUED | OUTPATIENT
Start: 2017-07-23 | End: 2017-07-25 | Stop reason: HOSPADM

## 2017-07-23 RX ORDER — ONDANSETRON 2 MG/ML
4 INJECTION INTRAMUSCULAR; INTRAVENOUS EVERY 6 HOURS PRN
Status: DISCONTINUED | OUTPATIENT
Start: 2017-07-23 | End: 2017-07-25 | Stop reason: HOSPADM

## 2017-07-23 RX ORDER — PANTOPRAZOLE SODIUM 40 MG/10ML
40 INJECTION, POWDER, LYOPHILIZED, FOR SOLUTION INTRAVENOUS
Status: DISCONTINUED | OUTPATIENT
Start: 2017-07-24 | End: 2017-07-25 | Stop reason: HOSPADM

## 2017-07-23 RX ADMIN — MORPHINE SULFATE 1 MG: 2 INJECTION, SOLUTION INTRAMUSCULAR; INTRAVENOUS at 21:52

## 2017-07-23 RX ADMIN — AMLODIPINE BESYLATE 10 MG: 10 TABLET ORAL at 20:19

## 2017-07-23 RX ADMIN — RANOLAZINE 1000 MG: 500 TABLET, FILM COATED, EXTENDED RELEASE ORAL at 20:24

## 2017-07-23 RX ADMIN — METOPROLOL SUCCINATE 100 MG: 100 TABLET, EXTENDED RELEASE ORAL at 20:22

## 2017-07-23 RX ADMIN — SERTRALINE 25 MG: 25 TABLET, FILM COATED ORAL at 20:26

## 2017-07-23 RX ADMIN — PRAMIPEXOLE DIHYDROCHLORIDE 2 MG: 1 TABLET ORAL at 20:23

## 2017-07-23 RX ADMIN — RIVAROXABAN 20 MG: 10 TABLET, FILM COATED ORAL at 20:25

## 2017-07-23 RX ADMIN — ATORVASTATIN CALCIUM 20 MG: 20 TABLET, FILM COATED ORAL at 20:21

## 2017-07-23 RX ADMIN — TRAZODONE HYDROCHLORIDE 300 MG: 150 TABLET ORAL at 20:26

## 2017-07-23 RX ADMIN — CLOPIDOGREL BISULFATE 75 MG: 75 TABLET ORAL at 20:22

## 2017-07-24 ENCOUNTER — APPOINTMENT (OUTPATIENT)
Dept: CT IMAGING | Facility: HOSPITAL | Age: 37
End: 2017-07-24

## 2017-07-24 ENCOUNTER — APPOINTMENT (OUTPATIENT)
Dept: CARDIOLOGY | Facility: HOSPITAL | Age: 37
End: 2017-07-24
Attending: FAMILY MEDICINE

## 2017-07-24 LAB
ALBUMIN SERPL-MCNC: 3.4 G/DL (ref 3.4–4.8)
ALBUMIN/GLOB SERPL: 1 G/DL (ref 1.1–1.8)
ALP SERPL-CCNC: 75 U/L (ref 38–126)
ALT SERPL W P-5'-P-CCNC: 28 U/L (ref 21–72)
ANION GAP SERPL CALCULATED.3IONS-SCNC: 9 MMOL/L (ref 5–15)
AST SERPL-CCNC: 22 U/L (ref 17–59)
BASOPHILS # BLD AUTO: 0.02 10*3/MM3 (ref 0–0.2)
BASOPHILS NFR BLD AUTO: 0.2 % (ref 0–2)
BH CV ECHO MEAS - AO MAX PG: 10.3 MMHG
BH CV ECHO MEAS - AO MEAN PG: 6.8 MMHG
BH CV ECHO MEAS - AO V2 MAX: 160.2 CM/SEC
BH CV ECHO MEAS - AO V2 MEAN: 121.7 CM/SEC
BH CV ECHO MEAS - AO V2 VTI: 33.4 CM
BH CV ECHO MEAS - MR MAX PG: 25 MMHG
BH CV ECHO MEAS - MR MAX VEL: 249.8 CM/SEC
BH CV ECHO MEAS - MV A MAX VEL: 99.2 CM/SEC
BH CV ECHO MEAS - MV E MAX VEL: 112.8 CM/SEC
BH CV ECHO MEAS - MV E/A: 1.1
BH CV ECHO MEAS - PA MAX PG: 6.2 MMHG
BH CV ECHO MEAS - PA V2 MAX: 124.9 CM/SEC
BH CV ECHO MEAS - RAP SYSTOLE: 10 MMHG
BH CV ECHO MEAS - RVSP: 33.6 MMHG
BH CV ECHO MEAS - TR MAX VEL: 242.5 CM/SEC
BILIRUB SERPL-MCNC: 0.4 MG/DL (ref 0.2–1.3)
BUN BLD-MCNC: 12 MG/DL (ref 7–21)
BUN/CREAT SERPL: 15.8 (ref 7–25)
CALCIUM SPEC-SCNC: 8.6 MG/DL (ref 8.4–10.2)
CHLORIDE SERPL-SCNC: 100 MMOL/L (ref 95–110)
CK MB SERPL-CCNC: 0.96 NG/ML (ref 0–5)
CK SERPL-CCNC: 110 U/L (ref 55–170)
CO2 SERPL-SCNC: 24 MMOL/L (ref 22–31)
CREAT BLD-MCNC: 0.76 MG/DL (ref 0.7–1.3)
DEPRECATED RDW RBC AUTO: 44.1 FL (ref 35.1–43.9)
EOSINOPHIL # BLD AUTO: 0.28 10*3/MM3 (ref 0–0.7)
EOSINOPHIL NFR BLD AUTO: 3.2 % (ref 0–7)
ERYTHROCYTE [DISTWIDTH] IN BLOOD BY AUTOMATED COUNT: 14.6 % (ref 11.5–14.5)
GFR SERPL CREATININE-BSD FRML MDRD: 115 ML/MIN/1.73 (ref 60–162)
GLOBULIN UR ELPH-MCNC: 3.5 GM/DL (ref 2.3–3.5)
GLUCOSE BLD-MCNC: 175 MG/DL (ref 60–100)
GLUCOSE BLDC GLUCOMTR-MCNC: 165 MG/DL (ref 70–130)
GLUCOSE BLDC GLUCOMTR-MCNC: 180 MG/DL (ref 70–130)
GLUCOSE BLDC GLUCOMTR-MCNC: 183 MG/DL (ref 70–130)
GLUCOSE BLDC GLUCOMTR-MCNC: 262 MG/DL (ref 70–130)
HCT VFR BLD AUTO: 38.2 % (ref 39–49)
HGB BLD-MCNC: 12.3 G/DL (ref 13.7–17.3)
IMM GRANULOCYTES # BLD: 0.08 10*3/MM3 (ref 0–0.02)
IMM GRANULOCYTES NFR BLD: 0.9 % (ref 0–0.5)
LYMPHOCYTES # BLD AUTO: 1.88 10*3/MM3 (ref 0.6–4.2)
LYMPHOCYTES NFR BLD AUTO: 21.4 % (ref 10–50)
MCH RBC QN AUTO: 26.8 PG (ref 26.5–34)
MCHC RBC AUTO-ENTMCNC: 32.2 G/DL (ref 31.5–36.3)
MCV RBC AUTO: 83.2 FL (ref 80–98)
MONOCYTES # BLD AUTO: 0.95 10*3/MM3 (ref 0–0.9)
MONOCYTES NFR BLD AUTO: 10.8 % (ref 0–12)
NEUTROPHILS # BLD AUTO: 5.56 10*3/MM3 (ref 2–8.6)
NEUTROPHILS NFR BLD AUTO: 63.5 % (ref 37–80)
PLATELET # BLD AUTO: 169 10*3/MM3 (ref 150–450)
PMV BLD AUTO: 9.2 FL (ref 8–12)
POTASSIUM BLD-SCNC: 4.1 MMOL/L (ref 3.5–5.1)
PROT SERPL-MCNC: 6.9 G/DL (ref 6.3–8.6)
RBC # BLD AUTO: 4.59 10*6/MM3 (ref 4.37–5.74)
SODIUM BLD-SCNC: 133 MMOL/L (ref 137–145)
TROPONIN I SERPL-MCNC: <0.012 NG/ML
WBC NRBC COR # BLD: 8.77 10*3/MM3 (ref 3.2–9.8)

## 2017-07-24 PROCEDURE — 93005 ELECTROCARDIOGRAM TRACING: CPT | Performed by: NURSE PRACTITIONER

## 2017-07-24 PROCEDURE — 93308 TTE F-UP OR LMTD: CPT

## 2017-07-24 PROCEDURE — 84484 ASSAY OF TROPONIN QUANT: CPT | Performed by: FAMILY MEDICINE

## 2017-07-24 PROCEDURE — 82962 GLUCOSE BLOOD TEST: CPT

## 2017-07-24 PROCEDURE — 93010 ELECTROCARDIOGRAM REPORT: CPT | Performed by: INTERNAL MEDICINE

## 2017-07-24 PROCEDURE — 82553 CREATINE MB FRACTION: CPT | Performed by: FAMILY MEDICINE

## 2017-07-24 PROCEDURE — 0 IOPAMIDOL 61 % SOLUTION: Performed by: FAMILY MEDICINE

## 2017-07-24 PROCEDURE — 94799 UNLISTED PULMONARY SVC/PX: CPT

## 2017-07-24 PROCEDURE — 80053 COMPREHEN METABOLIC PANEL: CPT | Performed by: NURSE PRACTITIONER

## 2017-07-24 PROCEDURE — G0378 HOSPITAL OBSERVATION PER HR: HCPCS

## 2017-07-24 PROCEDURE — 82550 ASSAY OF CK (CPK): CPT | Performed by: FAMILY MEDICINE

## 2017-07-24 PROCEDURE — 84484 ASSAY OF TROPONIN QUANT: CPT | Performed by: NURSE PRACTITIONER

## 2017-07-24 PROCEDURE — 96376 TX/PRO/DX INJ SAME DRUG ADON: CPT

## 2017-07-24 PROCEDURE — 93308 TTE F-UP OR LMTD: CPT | Performed by: INTERNAL MEDICINE

## 2017-07-24 PROCEDURE — 96375 TX/PRO/DX INJ NEW DRUG ADDON: CPT

## 2017-07-24 PROCEDURE — 25010000002 MORPHINE SULFATE (PF) 2 MG/ML SOLUTION: Performed by: FAMILY MEDICINE

## 2017-07-24 PROCEDURE — 85025 COMPLETE CBC W/AUTO DIFF WBC: CPT | Performed by: NURSE PRACTITIONER

## 2017-07-24 PROCEDURE — 74177 CT ABD & PELVIS W/CONTRAST: CPT

## 2017-07-24 PROCEDURE — 63710000001 INSULIN ASPART PER 5 UNITS: Performed by: NURSE PRACTITIONER

## 2017-07-24 PROCEDURE — 93005 ELECTROCARDIOGRAM TRACING: CPT | Performed by: FAMILY MEDICINE

## 2017-07-24 PROCEDURE — 94760 N-INVAS EAR/PLS OXIMETRY 1: CPT

## 2017-07-24 RX ORDER — CLOPIDOGREL BISULFATE 75 MG/1
75 TABLET ORAL NIGHTLY
Status: DISCONTINUED | OUTPATIENT
Start: 2017-07-24 | End: 2017-07-25 | Stop reason: HOSPADM

## 2017-07-24 RX ADMIN — RANOLAZINE 1000 MG: 500 TABLET, FILM COATED, EXTENDED RELEASE ORAL at 08:29

## 2017-07-24 RX ADMIN — RANOLAZINE 1000 MG: 500 TABLET, FILM COATED, EXTENDED RELEASE ORAL at 21:17

## 2017-07-24 RX ADMIN — PRAMIPEXOLE DIHYDROCHLORIDE 2 MG: 1 TABLET ORAL at 21:17

## 2017-07-24 RX ADMIN — CLOPIDOGREL BISULFATE 75 MG: 75 TABLET ORAL at 21:17

## 2017-07-24 RX ADMIN — IPRATROPIUM BROMIDE AND ALBUTEROL SULFATE 3 ML: 2.5; .5 SOLUTION RESPIRATORY (INHALATION) at 08:13

## 2017-07-24 RX ADMIN — ASPIRIN 81 MG: 81 TABLET, COATED ORAL at 08:29

## 2017-07-24 RX ADMIN — AMLODIPINE BESYLATE 10 MG: 10 TABLET ORAL at 21:17

## 2017-07-24 RX ADMIN — MORPHINE SULFATE 1 MG: 2 INJECTION, SOLUTION INTRAMUSCULAR; INTRAVENOUS at 14:00

## 2017-07-24 RX ADMIN — ACETAMINOPHEN 650 MG: 325 TABLET ORAL at 16:08

## 2017-07-24 RX ADMIN — Medication 10 ML: at 21:16

## 2017-07-24 RX ADMIN — SERTRALINE 25 MG: 25 TABLET, FILM COATED ORAL at 21:17

## 2017-07-24 RX ADMIN — INSULIN ASPART 6 UNITS: 100 INJECTION, SOLUTION INTRAVENOUS; SUBCUTANEOUS at 18:01

## 2017-07-24 RX ADMIN — METOPROLOL SUCCINATE 100 MG: 100 TABLET, EXTENDED RELEASE ORAL at 21:17

## 2017-07-24 RX ADMIN — PANTOPRAZOLE SODIUM 40 MG: 40 INJECTION, POWDER, FOR SOLUTION INTRAVENOUS at 06:10

## 2017-07-24 RX ADMIN — ATORVASTATIN CALCIUM 20 MG: 20 TABLET, FILM COATED ORAL at 21:17

## 2017-07-24 RX ADMIN — INSULIN ASPART 2 UNITS: 100 INJECTION, SOLUTION INTRAVENOUS; SUBCUTANEOUS at 21:16

## 2017-07-24 RX ADMIN — LISINOPRIL 40 MG: 40 TABLET ORAL at 06:09

## 2017-07-24 RX ADMIN — ISOSORBIDE MONONITRATE 120 MG: 60 TABLET, EXTENDED RELEASE ORAL at 06:09

## 2017-07-24 RX ADMIN — MORPHINE SULFATE 1 MG: 2 INJECTION, SOLUTION INTRAMUSCULAR; INTRAVENOUS at 08:30

## 2017-07-24 RX ADMIN — RIVAROXABAN 20 MG: 10 TABLET, FILM COATED ORAL at 18:03

## 2017-07-24 RX ADMIN — IOPAMIDOL 97 ML: 612 INJECTION, SOLUTION INTRAVENOUS at 11:44

## 2017-07-24 RX ADMIN — MORPHINE SULFATE 1 MG: 2 INJECTION, SOLUTION INTRAMUSCULAR; INTRAVENOUS at 21:16

## 2017-07-24 RX ADMIN — MORPHINE SULFATE 1 MG: 2 INJECTION, SOLUTION INTRAMUSCULAR; INTRAVENOUS at 04:33

## 2017-07-24 RX ADMIN — TRAZODONE HYDROCHLORIDE 300 MG: 150 TABLET ORAL at 21:17

## 2017-07-24 NOTE — H&P
[facility] Medicine Admission      Date of Admission: 2017      Primary Care Physician: LALA Barajas      Chief Complaint:  Chest pain    HPI: This is a 36-year-old man that is admitted from a direct admission from [facility] with complaints of chest pain.  He describes the pain as a squeezing pressure in the center of the chest that radiates up the right side of his neck.  The patient states the pain was relieved with nitroglycerin prior to arrival.  He states he became diaphoretic with the incident.  Complains of no nausea vomiting or syncope.    Concurrent Medical History: Hyperlipidemia, obstructive sleep apnea, hypertension, morbid obesity, pulmonary embolism, type 2 diabetes mellitus, GERD, restless leg syndrome, coronary artery disease post 2 stents, factor II deficiency.      Past Surgical History: Tonsillectomy and adenoidectomy, cardiac catheterization status post 2 stents, cholecystectomy.    Family History:  Mother and father are both living mother has heart murmur and CHF, father has CHF.  Maternal grandmother  from [place].      Social History:  reports that he has quit smoking. His smokeless tobacco use includes Snuff. He reports that he does not drink alcohol or use illicit drugs.    Allergies:   Allergies   Allergen Reactions   • Glipizide Other (See Comments)     Hallucinations, Slurred Speech  Slurred speech     • Phenergan [Promethazine Hcl]      Burning veins   • Promethazine    • Risperdal [Risperidone]      Slurred speech   • Tramadol      seizures   • Reglan [Metoclopramide] Anxiety       Medications: Scheduled Meds:  amLODIPine 10 mg Oral Nightly   aspirin 324 mg Oral Once   And      [START ON 2017] aspirin 81 mg Oral Daily   atorvastatin 20 mg Oral Daily   clopidogrel 75 mg Oral Nightly   insulin aspart 0-9 Units Subcutaneous 4x Daily AC & at Bedtime   [START ON 2017] isosorbide mononitrate 120 mg Oral QAM      [START ON 7/24/2017] lisinopril 40 mg Oral QAM   metoprolol succinate  mg Oral Nightly   [START ON 7/24/2017] pantoprazole 40 mg Intravenous Q AM   pramipexole 2 mg Oral Nightly   ranolazine 1,000 mg Oral Q12H   rivaroxaban 20 mg Oral Daily With Dinner   sertraline 25 mg Oral Nightly   traZODone 300 mg Oral Nightly     Continuous Infusions:   PRN Meds:.•  acetaminophen  •  dextrose  •  dextrose  •  glucagon (human recombinant)  •  Morphine  •  ondansetron  •  sodium chloride    Review of Systems:  Review of Systems   Constitutional: Negative.    HENT: Negative.    Cardiovascular: Positive for chest pain and leg swelling.   Gastrointestinal: Positive for abdominal pain (left lower quadrant and umbilical pain).   All other systems reviewed and are negative.     Otherwise complete ROS is negative except as mentioned above.    Physical Exam:   Temp:  [98.5 °F (36.9 °C)] 98.5 °F (36.9 °C)  Heart Rate:  [79] 79  Resp:  [18] 18  BP: (142)/(70) 142/70  Physical Exam   Constitutional: He is oriented to person, place, and time. He appears well-developed.   HENT:   Head: Normocephalic and atraumatic.   Eyes: EOM are normal. Pupils are equal, round, and reactive to light.   Neck: Normal range of motion. Neck supple.   Cardiovascular: Normal rate and regular rhythm.    Pulmonary/Chest: Effort normal and breath sounds normal.   Abdominal: Soft. Bowel sounds are normal.   Musculoskeletal: Normal range of motion.   Neurological: He is alert and oriented to person, place, and time.   Skin: Skin is warm and dry.   Psychiatric: He has a normal mood and affect.     Results Reviewed:  I have personally reviewed current lab, radiology, and data and agree with results.  Lab Results (last 24 hours)     ** No results found for the last 24 hours. **        Imaging Results (last 24 hours)     ** No results found for the last 24 hours. **            Plan:  1.  Chest pain, r/o acute coronary syndrome:  Serial cardiac enzymes, morphine,  oxygen, nitrates and aspirin.  Cardiology consulted.   2.  Coronary artery disease:  We'll continue with dual antiplatelet treatment.  3.  Hypertension: We'll continue with home medications.  4.  Hyperlipidemia: Continue pravastatin.  5.  History of pulmonary embolism:  Continue Xarelto.  6.  Left lower quadrant pain: CT abdomen and pelvis pending  7.  Sleep apnea:  The patient's wife is bringing his home CPAP machine to him.      I discussed the patients findings and my ecommendations with: Patient      This document has been electronically signed by LALA Carvajal on July 23, 2017 7:23 PM

## 2017-07-24 NOTE — CONSULTS
"Adult Nutrition  Assessment    Patient Name:  Yordy Hansen  YOB: 1980  MRN: [id number]  Admit Date:  7/23/2017    Assessment Date:  7/24/2017          Reason for Assessment       07/24/17 1403    Reason for Assessment    Reason For Assessment/Visit admission assessment    Identified At Risk By Screening Criteria BMI    Time Spent (min) 5    Diagnosis Diagnosis   chest pain                Nutrition/Diet History       07/24/17 1404    Nutrition/Diet History    Typical Food/Fluid Intake pt said he would like info on the [facility] cardiac diet later. eating lunch now. no history of weight loss.    Typical Activity Patterns sedentary              Labs/Tests/Procedures/Meds       07/24/17 1405    Labs/Tests/Procedures/Meds    Labs/Tests Review Reviewed;Glucose;Hgb Hct;Na+    Medication Review Reviewed, pertinent    Significant Vitals reviewed            Physical Findings       07/24/17 1405    Physical Findings/Assessment    Additional Documentation Physical Appearance (Group)    Physical Appearance    Overall Physical Appearance obese            Estimated/Assessed Needs       07/24/17 1406    Calculation Measurements    Weight Used For Calculations 110 kg (242 lb 8.1 oz)    Height Used for Calculations 1.803 m (5' 11\")    Estimated/Assessed Energy Needs    Age 36    RMR (Angier-St. Jeor Equation) 2042.12    Total estimated needs (Angier St. Jeor) 2400--921=6344 calories    Estimated/Assessed Protein Needs    Weight Used for Protein Calculation 110 kg (242 lb 8.1 oz)    Protein (gm/kg) 0.8    0.8 Gm Protein (gm) 88    Estimated/Assessed Fiber Needs    Average Gm/day 2400 Gm/day                Comments:  Pt was eating his lunch. Minimal weight loss in the past. Doesn't follow his diet at home. Would like RDN to bring info back later on the [facility] cardiac diet. bmi is 63.6 or morbidly obese. Appetite and intake are good. MD is investigating chronic loose stools. RDN staff will continue to monitor and provide " written materials as requested.        Electronically signed by:  Deyanira Cannon RD  07/24/17 2:09 PM

## 2017-07-24 NOTE — PROGRESS NOTES
[facility] Medicine Services  INPATIENT PROGRESS NOTE    Length of Stay: 0  Date of Admission: 7/23/2017  Primary Care Physician: LALA Barajas    Subjective   No chief complaint on file.  Chest pain     HPI:  36-year-old gentleman with a history of pulmonary embolism, coronary artery disease, type 2 diabetes mellitus, factor II deficiency, was admitted for acute chest pain.  Patient state that he is still having mild chest pain which is pressure-like in nature but improved from the time he was admitted.    Review of Systems   Constitutional: Negative for chills, fatigue and fever.   HENT: Negative for rhinorrhea.    Respiratory: Negative for cough, choking, shortness of breath and wheezing.    Cardiovascular: Positive for chest pain. Negative for palpitations and leg swelling.   Gastrointestinal: Positive for abdominal pain. Negative for abdominal distention, anal bleeding, constipation, diarrhea, nausea and vomiting.   Genitourinary: Negative for dysuria, frequency and urgency.   Musculoskeletal: Negative for gait problem.   Skin: Negative for wound.   Neurological: Negative for dizziness and light-headedness.   Hematological: Negative for adenopathy.   Psychiatric/Behavioral: Negative for agitation and behavioral problems.        All pertinent negatives and positives are as above. All other systems have been reviewed and are negative unless otherwise stated.     Objective      Vital Sign Min/Max for last 24 hours  Temp  Min: 97 °F (36.1 °C)  Max: 98.5 °F (36.9 °C)   BP  Min: 114/56  Max: 175/74   Pulse  Min: 66  Max: 80   Resp  Min: 18  Max: 22   SpO2  Min: 93 %  Max: 97 %   No Data Recorded   Weight  Min: 456 lb (207 kg)  Max: 456 lb (207 kg)         Physical Exam   Constitutional: He is oriented to person, place, and time. He appears well-developed.   HENT:   Head: Normocephalic.   Eyes: EOM are normal. Pupils are equal, round, and reactive to light.   Neck:  Normal range of motion. Neck supple.   Cardiovascular: Normal rate, regular rhythm and normal heart sounds.    Pulmonary/Chest: Effort normal and breath sounds normal.   Abdominal: Soft. Bowel sounds are normal. There is tenderness in the right upper quadrant.   Musculoskeletal: Normal range of motion.   Neurological: He is alert and oriented to person, place, and time.   Skin: Skin is warm.   Psychiatric: He has a normal mood and affect. His behavior is normal.   Vitals reviewed.      Current Facility-Administered Medications   Medication Dose Route Frequency Provider Last Rate Last Dose   • acetaminophen (TYLENOL) tablet 650 mg  650 mg Oral Q4H PRN Lavonne G Levill, APRN       • amLODIPine (NORVASC) tablet 10 mg  10 mg Oral Nightly Lavonne G Levill, APRN   10 mg at 07/23/17 2019   • aspirin chewable tablet 324 mg  324 mg Oral Once Lavonne G Levill, APRN        And   • aspirin EC tablet 81 mg  81 mg Oral Daily Lavonne G Levill, APRN   81 mg at 07/24/17 0829   • atorvastatin (LIPITOR) tablet 20 mg  20 mg Oral Nightly Lavonne G Levill, APRN   20 mg at 07/23/17 2021   • clopidogrel (PLAVIX) tablet 75 mg  75 mg Oral Nightly Edwige Briceno MD       • dextrose (D50W) solution 25 g  25 g Intravenous Q15 Min PRN Lavonne G Levill, APRN       • dextrose (GLUTOSE) oral gel 15 g  15 g Oral Q15 Min PRN Lavonne G Levill, APRN       • glucagon (human recombinant) (GLUCAGEN DIAGNOSTIC) injection 1 mg  1 mg Subcutaneous Q15 Min PRN Lavonne G Levill, APRN       • insulin aspart (novoLOG) injection 0-9 Units  0-9 Units Subcutaneous 4x Daily AC & at Bedtime Lavonne G Levill, APRN       • ipratropium-albuterol (DUO-NEB) nebulizer solution 3 mL  3 mL Nebulization 4x Daily - RT Lavonne G Levill, APRN   3 mL at 07/24/17 0813   • isosorbide mononitrate (IMDUR) 24 hr tablet 120 mg  120 mg Oral QAM Lavonne G Levill, APRN   120 mg at 07/24/17 0609   • lisinopril (PRINIVIL,ZESTRIL) tablet 40 mg  40 mg Oral QAM Lavonne G Levill, APRN   40 mg at 07/24/17 0609   •  metoprolol succinate XL (TOPROL-XL) 24 hr tablet 100 mg  100 mg Oral Nightly Lavonne G Levill, APRN   100 mg at 07/23/17 2022   • Morphine sulfate (PF) injection 1 mg  1 mg Intravenous Q4H PRN Wai Fischer MD   1 mg at 07/24/17 1400   • ondansetron (ZOFRAN) injection 4 mg  4 mg Intravenous Q6H PRN Lavonne G Levill, APRN       • pantoprazole (PROTONIX) injection 40 mg  40 mg Intravenous Q AM Lavonne G Levill, APRN   40 mg at 07/24/17 0610   • pramipexole (MIRAPEX) tablet 2 mg  2 mg Oral Nightly Lavonne G Levill, APRN   2 mg at 07/23/17 2023   • ranolazine (RANEXA) 12 hr tablet 1,000 mg  1,000 mg Oral Q12H Lavonne G Levill, APRN   1,000 mg at 07/24/17 0829   • rivaroxaban (XARELTO) tablet 20 mg  20 mg Oral Daily With Dinner Lavonne G Levill, APRN   20 mg at 07/23/17 2025   • sertraline (ZOLOFT) tablet 25 mg  25 mg Oral Nightly Lavonne G Levill, APRN   25 mg at 07/23/17 2026   • sodium chloride 0.9 % flush 1-10 mL  1-10 mL Intravenous PRN Lavonne G Levill, APRN       • tiZANidine (ZANAFLEX) tablet 4 mg  4 mg Oral Q8H PRN Lavonne G Levill, APRN       • traZODone (DESYREL) tablet 300 mg  300 mg Oral Nightly Lavonne G Levill, APRN   300 mg at 07/23/17 2026           Results Review:  I have reviewed the labs, radiology results, and diagnostic studies.    Laboratory Data:     Results from last 7 days  Lab Units 07/24/17  0648 07/23/17  1936   SODIUM mmol/L 133* 135*   POTASSIUM mmol/L 4.1 4.0   CHLORIDE mmol/L 100 101   CO2 mmol/L 24.0 23.0   BUN mg/dL 12 11   CREATININE mg/dL 0.76 0.73   GLUCOSE mg/dL 175* 123*   CALCIUM mg/dL 8.6 8.7   BILIRUBIN mg/dL 0.4 0.4   ALK PHOS U/L 75 81   ALT (SGPT) U/L 28 32   AST (SGOT) U/L 22 19   ANION GAP mmol/L 9.0 11.0     Estimated Creatinine Clearance: 238.6 mL/min (by C-G formula based on Cr of 0.76).            Results from last 7 days  Lab Units 07/24/17  0648 07/23/17  1936   WBC 10*3/mm3 8.77 8.48   HEMOGLOBIN g/dL 12.3* 12.9*   HEMATOCRIT % 38.2* 37.8*   PLATELETS 10*3/mm3 169 184            Culture Data:   No results found for: BLOODCX  No results found for: URINECX  No results found for: RESPCX  No results found for: WOUNDCX  No results found for: STOOLCX  No components found for: BODYFLD      Radiology Data:   Imaging Results (last 24 hours)     Procedure Component Value Units Date/Time    SCANNED - IMAGING [[id number]] Resulted:  07/23/17      Updated:  07/24/17 1319    CT Abdomen Pelvis With Contrast [[id number]] Collected:  07/24/17 1136     Updated:  07/24/17 1336    Narrative:         EXAMINATION:  Computed Tomography           REGION:    Abdomen / Pelvis                     INDICATION:  Left lower quadrant pain  HISTORY:  MATT. IMAGING:    CT A/P 3/5/16            TECHNIQUE:      - reconstructions:    axial, coronal, sagittal         - contrast:      oral:  Yes ;   intravenous:  Yes  Isovue 300,  90 mL  This exam was performed according to the departmental  dose-optimization program which includes automated exposure  control, adjustment of the mA and/or kV according to patient size  and/or use of iterative reconstruction technique.           COMMENTS:            - - - CT ABDOMEN - - -          THORAX (INFERIOR):      - LUNG BASES:  clear      - PLEURA:    no fluid or mass      - HEART:    normal size, no pericardial fluid     - MISC:      n/a          ABDOMEN:     - LIVER:    normal size / contour, no ductal dilatation , no  focal lesion   - GB:      Status post cholecystectomy    - CBD:      grossly negative   - SPLEEN:    normal size and contour   - PANCREAS:    normal in size, contour, no focal mass    - VISCERA:    normal caliber, no wall thickening     - MESENTERY:  no mesenteric mass   - CAVITY:    no free abdominal fluid, no free intraperitoneal  air   - BODY WALL:  wnl   - OSSEOUS:    grossly negative for age   - MISC:                                      RETROPERITONEUM:   - KIDNEYS:    normal size / contour, no collecting system  dilation        no evidence of an enhancing mass   -  URETERS:    normal course, caliber   - ADRENALS:    Stable left adrenal adenoma.   - MISC:      no sig retroperitoneal adenopathy or mass   - VASCULAR:    aorta / iliacs: wnl for age     - - - CT PELVIS - - -      - VISCERA:    normal caliber small/large bowel, no focal  thickening/mass - MESENTERY:  no mass   - VASCULAR:    wnl for age   - CAVITY:    no free fluid / air   - BLADDER:    unremarkable   - OSSEOUS:    wnl    - MISC:   - PROSTATE:  grossly wnl     .       Impression:        CONCLUSION:  1. No evidence of an acute intra-abdominal/pelvic process.        Electronically signed by:  MIGUEL A Soni MD  7/24/2017 1:35  PM CDT Workstation: [employer]          I have reviewed the patient current medications.     Assessment/Plan     Active Hospital Problems (** Indicates Principal Problem)    Diagnosis Date Noted   • Chest pain [R07.9] 03/25/2017      Resolved Hospital Problems    Diagnosis Date Noted Date Resolved   No resolved problems to display.     1.  Precordial chest pain: Patient is on BENITO treatment.  He is also on Plavix and Xarelto.  Dr. Briceno consulted.  EKG is normal, artery enzymes are negative.  We will obtain echocardiogram.  2.  Factor II deficiency: Patient is on Xarelto continue with her treatment.  3.  History of Pulmonary embolism: Continue Xarelto) 20 mg.  4.  Coronary artery disease: Continue with the current treatment of Plavix, statin, Ace inhibitor, and metoprolol.   5.  Type 2 diabetes mellitus continue with sliding scale insulin.  Metformin is on hold.  6.  Depression: Continue with Zoloft in the hospital.    Discharge Planning: I expect patient to be discharged to home in 1-2 days.      DVT Prophylaxis: Xarelto               This document has been electronically signed by Gonsalo Hogue MD on July 24, 2017 3:15 PM

## 2017-07-25 VITALS
OXYGEN SATURATION: 94 % | RESPIRATION RATE: 20 BRPM | WEIGHT: 315 LBS | HEART RATE: 74 BPM | BODY MASS INDEX: 44.1 KG/M2 | DIASTOLIC BLOOD PRESSURE: 91 MMHG | HEIGHT: 71 IN | TEMPERATURE: 97.2 F | SYSTOLIC BLOOD PRESSURE: 127 MMHG

## 2017-07-25 LAB
GLUCOSE BLDC GLUCOMTR-MCNC: 150 MG/DL (ref 70–130)
GLUCOSE BLDC GLUCOMTR-MCNC: 188 MG/DL (ref 70–130)

## 2017-07-25 PROCEDURE — 25010000002 MORPHINE SULFATE (PF) 2 MG/ML SOLUTION: Performed by: FAMILY MEDICINE

## 2017-07-25 PROCEDURE — G0378 HOSPITAL OBSERVATION PER HR: HCPCS

## 2017-07-25 PROCEDURE — 63710000001 INSULIN ASPART PER 5 UNITS: Performed by: NURSE PRACTITIONER

## 2017-07-25 PROCEDURE — 96376 TX/PRO/DX INJ SAME DRUG ADON: CPT

## 2017-07-25 PROCEDURE — 82962 GLUCOSE BLOOD TEST: CPT

## 2017-07-25 RX ADMIN — RANOLAZINE 1000 MG: 500 TABLET, FILM COATED, EXTENDED RELEASE ORAL at 08:55

## 2017-07-25 RX ADMIN — PANTOPRAZOLE SODIUM 40 MG: 40 INJECTION, POWDER, FOR SOLUTION INTRAVENOUS at 06:36

## 2017-07-25 RX ADMIN — ASPIRIN 81 MG: 81 TABLET, COATED ORAL at 08:55

## 2017-07-25 RX ADMIN — MORPHINE SULFATE 1 MG: 2 INJECTION, SOLUTION INTRAMUSCULAR; INTRAVENOUS at 06:36

## 2017-07-25 RX ADMIN — LISINOPRIL 40 MG: 40 TABLET ORAL at 06:35

## 2017-07-25 RX ADMIN — INSULIN ASPART 2 UNITS: 100 INJECTION, SOLUTION INTRAVENOUS; SUBCUTANEOUS at 08:55

## 2017-07-25 RX ADMIN — ISOSORBIDE MONONITRATE 120 MG: 60 TABLET, EXTENDED RELEASE ORAL at 06:35

## 2017-07-25 NOTE — PLAN OF CARE
Problem: Patient Care Overview (Adult)  Goal: Plan of Care Review  Outcome: Ongoing (interventions implemented as appropriate)    07/25/17 0543   Coping/Psychosocial Response Interventions   Plan Of Care Reviewed With patient   Patient Care Overview   Progress no change       Goal: Adult Individualization and Mutuality  Outcome: Ongoing (interventions implemented as appropriate)  Goal: Discharge Needs Assessment  Outcome: Ongoing (interventions implemented as appropriate)    Problem: Acute Coronary Syndrome (ACS) (Adult)  Goal: Signs and Symptoms of Listed Potential Problems Will be Absent or Manageable (Acute Coronary Syndrome)  Outcome: Ongoing (interventions implemented as appropriate)

## 2017-07-25 NOTE — PROGRESS NOTES
Adult Nutrition  Assessment    Patient Name:  Yordy Hansen  YOB: 1980  MRN: [id number]  Admit Date:  7/23/2017    Assessment Date:  7/25/2017                            Comments:  Pt had MANY questions re what to eat to control his blood sugar and to lose weight. Obstacles are:  1) he does limited cooking/prepares what his wife will eat(she doesn't know how to cook),2) he doesn't know what to eat or how much,  3) hedrinks regular soda ,4) heeats one meal per day and eats in the night. Extended time spent with pt answering his questions. Started a grocery list for pt to finish, gave suggestions on cooking fish, vegetables he wants to try, reviewed carb counting, set goals with pt's agreement re eating regular meals and drinking no calorie beverages.pt was able to correctly choose the carb foods from a sample menu. Pt voices motivation to make changes to feel better, lose weight and lower his aic from 7.4. [employer] number provided if questions arise. Additional education is available outpatient one on one or in the free diabetes class.[facility] staff to continue to monitor.        Electronically signed by:  Deyanira Cannon RD  07/25/17 11:12 AM

## 2017-07-25 NOTE — DISCHARGE SUMMARY
[facility] Medicine Services  DISCHARGE SUMMARY       Date of Admission: 7/23/2017  Date of Discharge:  7/25/2017  Primary Care Physician: LALA Barajas    Presenting Problem/History of Present Illness:  Chest pain [R07.9]       Final Discharge Diagnoses:  Hospital Problem List     Hyperlipidemia (Chronic)    Morbid obesity (Chronic)    Overview Deleted 4/24/2017  1:14 PM by Rehan Griffiths MD            Chest pain    Pulmonary embolism (Chronic)    Type 2 diabetes mellitus (Chronic)    Coronary artery disease involving native heart    Factor II deficiency          Consults:   Consults     Date and Time Order Name Status Description    7/23/2017 1941 Inpatient Consult to Cardiology          Pertinent Test Results:     Lab Results (last 24 hours)     Procedure Component Value Units Date/Time    POC Glucose Fingerstick [[id number]]  (Abnormal) Collected:  07/24/17 1716    Specimen:  Blood Updated:  07/24/17 1734     Glucose 262 (H) mg/dL       RN NotifiedMeter: [id number]: [id number] LISA JADA       POC Glucose Fingerstick [[id number]]  (Abnormal) Collected:  07/24/17 2105    Specimen:  Blood Updated:  07/24/17 2118     Glucose 165 (H) mg/dL       RN NotifiedMeter: [id number]: [id number] [facility]       POC Glucose Fingerstick [[id number]]  (Abnormal) Collected:  07/25/17 0622    Specimen:  Blood Updated:  07/25/17 0646     Glucose 150 (H) mg/dL       RN NotifiedMeter: [id number]: [id number] [facility] Course:  The patient is a 36 y.o. male who presented to [facility] with Chest pain and abdominal pain.  Patient is well known to the hospitalist staff for chronic chest pain, chronic pulmonary embolism, and chronic poor lifestyle management.  Patient had normal cardiac enzymes and was cleared by his cardiologist, Dr. Briceno.  Dr. Briceno states that at this time there is absolutely no further  "cardiac intervention needed or appropriate.  Dr. Briceno recommends continuing workup outpatient for noncardiac causes of his pain.  Patient had no chest pain at the time of discharge.  Patient denied shortness of air at time of discharge.  CT of the abdomen and pelvis within normal limits, no acute findings.  Outpatient GI workup will be ordered.  Should be noted that during multiple visits to the hospital, patient partakes in poor lifestyle choices.  Patient eats entire pizzas and other high calorie, high fat foods while also eating regular hospital food.  He has been counseled multiple times on weight loss and lifestyle modification.  This time patient states he feels well and is excited to go home.      Condition on Discharge:  Stable    Physical Exam on Discharge:  /91 (BP Location: Left arm, Patient Position: Sitting)  Pulse 74  Temp 97.2 °F (36.2 °C) (Temporal Artery )   Resp 20  Ht 71\" (180.3 cm)  Wt (!) 448 lb 12.8 oz (204 kg)  SpO2 94%  BMI 62.59 kg/m2  Physical Exam   Constitutional: He is oriented to person, place, and time. He appears well-developed and well-nourished.   HENT:   Head: Normocephalic and atraumatic.   Eyes: Conjunctivae are normal.   Cardiovascular: Normal rate, regular rhythm and normal heart sounds.    Pulmonary/Chest: Effort normal and breath sounds normal. No respiratory distress. He has no wheezes.   Abdominal: Soft. Bowel sounds are normal. He exhibits no distension. There is no tenderness.   Neurological: He is alert and oriented to person, place, and time.   Skin: Skin is warm and dry.   Psychiatric: He has a normal mood and affect. His behavior is normal.     Discharge Disposition:  Home or Self Care    Discharge Medications:   Yordy Hansen   Home Medication Instructions ROCKY:[id number]    Printed on:07/25/17 1203   Medication Information                      albuterol (PROVENTIL HFA;VENTOLIN HFA) 108 (90 BASE) MCG/ACT inhaler  Inhale 2 puffs as needed for " wheezing.             amLODIPine (NORVASC) 10 MG tablet  Take 1 tablet by mouth Every Night.             Canagliflozin (INVOKANA) 300 MG tablet  Take 300 mg by mouth Every Morning Before Breakfast for 90 days.             clopidogrel (PLAVIX) 75 MG tablet  Take 1 tablet by mouth Daily.             HYDROcodone-acetaminophen (NORCO)  MG per tablet  Take  tablets by mouth Every 6 (Six) Hours As Needed.             ipratropium-albuterol (DUO-NEB) 0.5-2.5 mg/mL nebulizer  Inhale 3 mL Every 4 (Four) Hours As Needed.             isosorbide mononitrate (IMDUR) 120 MG 24 hr tablet  Take 1 tablet by mouth Every Morning.             Liraglutide (VICTOZA) 18 MG/3ML solution pen-injector  Inject 1.8 mg under the skin Daily.             lisinopril (PRINIVIL,ZESTRIL) 40 MG tablet  Take 1 tablet by mouth Every Morning.             metFORMIN (GLUCOPHAGE) 1000 MG tablet  Take 1 tablet by mouth 2 (Two) Times a Day With Meals.             MethylPREDNISolone (MEDROL, ETELVINA,) 4 MG tablet  Take as directed on package instructions.             metoprolol succinate XL (TOPROL-XL) 100 MG 24 hr tablet  Take 1 tablet by mouth Every Night.             MICROLET LANCETS misc  USE TO TEST BLOOD SUGAR EVERY DAY AND AS NEEDED             nitroglycerin (NITROSTAT) 0.4 MG SL tablet  Place 1 tablet under the tongue Every 5 (Five) Minutes As Needed for Chest Pain.             ondansetron ODT (ZOFRAN-ODT) 4 MG disintegrating tablet  Take 1 tablet by mouth Every 6 (Six) Hours As Needed for Nausea or Vomiting.             pantoprazole (PROTONIX) 40 MG EC tablet  Take 1 tablet by mouth Every Night.             pramipexole (MIRAPEX) 1 MG tablet  Take 2 tablets by mouth Every Night. Taking 2mg daily             pravastatin (PRAVACHOL) 80 MG tablet  Take 1 tablet by mouth Every Night.             ranolazine (RANEXA) 1000 MG 12 hr tablet  Take 1 tablet by mouth Every 12 (Twelve) Hours.             rivaroxaban (XARELTO) 20 MG tablet  Take 1 tablet by  mouth Daily.             sertraline (ZOLOFT) 25 MG tablet  Take 1 tablet by mouth Daily.             TiZANidine (ZANAFLEX) 2 MG capsule  Take 1 capsule by mouth 3 (Three) Times a Day As Needed for Muscle Spasms.             traZODone (DESYREL) 300 MG tablet  Take 1 tablet by mouth Every Night.                 Discharge Diet:   Diet Instructions     Diet: Consistent Carbohydrate, Cardiac; Thin Liquids, No Restrictions       Discharge Diet:   Consistent Carbohydrate  Cardiac      Fluid Consistency:  Thin Liquids, No Restrictions                 Activity at Discharge:   Activity Instructions     Activity as Tolerated                     Discharge Care Plan/Instructions:  Outpatient GI workup, return with any concerning or worsening symptoms, outpatient follow-up with Dr. Briceno, continue with lifestyle modification as instructed.        This document has been electronically signed by HENNA Rodriguez on July 25, 2017 12:03 PM

## 2017-07-25 NOTE — PLAN OF CARE
Problem: Patient Care Overview (Adult)  Goal: Plan of Care Review  Outcome: Outcome(s) achieved Date Met:  07/25/17  Goal: Adult Individualization and Mutuality  Outcome: Outcome(s) achieved Date Met:  07/25/17  Goal: Discharge Needs Assessment  Outcome: Outcome(s) achieved Date Met:  07/25/17

## 2017-07-25 NOTE — PROGRESS NOTES
Discharge Planning Assessment  [facility]     Patient Name: Yordy Hansen  MRN: [id number]  Today's Date: 7/25/2017    Admit Date: 7/23/2017          Discharge Needs Assessment       07/25/17 1116    Living Environment    Lives With spouse    Living Arrangements house    Home Accessibility no concerns    Type of Financial/Environmental Concern none    Transportation Available car    Living Environment    Provides Primary Care For no one    Quality Of Family Relationships supportive    Able to Return to Prior Living Arrangements yes    Living Arrangement Comments Pt resides at home with his spouse.  Pt appears to have good family support.    Discharge Needs Assessment    Concerns To Be Addressed adjustment to diagnosis/illness concerns    Anticipated Changes Related to Illness none    Equipment Currently Used at Home bipap/ cpap    Equipment Needed After Discharge none    Current Discharge Risk chronically ill    Discharge Disposition home or self-care    Discharge Planning Comments Pt plans to return home at d/c and did not anticipate any needs at this time.            Discharge Plan       07/25/17 1118    Case Management/Social Work Plan    Plan Home no services    Patient/Family In Agreement With Plan yes    Additional Comments LSW assesment complete. pt resides at home with spouse. pt reports having good family support. pt plans to return home at d/c and did not anticipate any needs at this time. LSW/case mgt will follow up as consulted anticipate d/c later today.      07/25/17 1032    Case Management/Social Work Plan    Additional Comments SW attempted to complete LACE. Nutritionist currently in pt's room....GINNA Garrett.        Discharge Placement     No information found        Expected Discharge Date and Time     Expected Discharge Date Expected Discharge Time    Jul 25, 2017               Demographic Summary       07/25/17 1113    Referral Information    Admission Type observation    Referral  Source physician    Reason For Consult discharge planning    Record Reviewed medical record    Contact Information    Permission Granted to Share Information With     Primary Care Physician Information    Name Froylan REEVES            Functional Status       07/25/17 1115    Functional Status Prior    Ambulation 0-->independent    Transferring 0-->independent    Toileting 0-->independent    Bathing 0-->independent    Dressing 0-->independent    Eating 0-->independent    Communication 0-->understands/communicates without difficulty    Swallowing 0-->swallows foods/liquids without difficulty    IADL    Medications independent    Meal Preparation independent    Housekeeping independent    Laundry independent    Shopping independent    Oral Care independent    Activity Tolerance    Current Activity Limitations none    Usual Activity Tolerance good    Current Activity Tolerance moderate    Cognitive/Perceptual/Developmental    Current Mental Status/Cognitive Functioning no deficits noted    Recent Changes in Mental Status/Cognitive Functioning no changes    Developmental Stage (Eriksson's Stages of Development) Stage 7 (35-65 years/Middle Adulthood) Generativity vs. Stagnation            Psychosocial     None            Abuse/Neglect     None            Legal     None            Substance Abuse     None            Patient Forms     None          GINNA Ordoñez

## 2017-07-27 ENCOUNTER — OFFICE VISIT (OUTPATIENT)
Dept: GASTROENTEROLOGY | Facility: CLINIC | Age: 37
End: 2017-07-27

## 2017-07-27 VITALS
BODY MASS INDEX: 44.1 KG/M2 | HEIGHT: 71 IN | DIASTOLIC BLOOD PRESSURE: 64 MMHG | SYSTOLIC BLOOD PRESSURE: 158 MMHG | HEART RATE: 82 BPM | WEIGHT: 315 LBS

## 2017-07-27 DIAGNOSIS — R10.13 EPIGASTRIC PAIN: Primary | ICD-10-CM

## 2017-07-27 PROCEDURE — 99203 OFFICE O/P NEW LOW 30 MIN: CPT | Performed by: INTERNAL MEDICINE

## 2017-07-27 RX ORDER — SODIUM CHLORIDE 0.9 % (FLUSH) 0.9 %
1-10 SYRINGE (ML) INJECTION AS NEEDED
Status: CANCELLED | OUTPATIENT
Start: 2017-07-27

## 2017-07-27 RX ORDER — DEXTROSE AND SODIUM CHLORIDE 5; .45 G/100ML; G/100ML
30 INJECTION, SOLUTION INTRAVENOUS CONTINUOUS PRN
Status: CANCELLED | OUTPATIENT
Start: 2017-07-27

## 2017-07-27 NOTE — PROGRESS NOTES
[facility]      Chief Complaint:   Chief Complaint   Patient presents with   • GI Problem      Hosp Follow up       Subjective     HPI:   Patient with epigastric abdominal pain severe nature.  Patient recently discharged from the hospital with episodes of chest pain which was determined to not be cardiac.  Patient is morbidly obese discussed with patient thinks he can do to try to lose weight.  Patient states he also is having diarrhea and loose stools.    Plan; patient will need EGD to evaluate also epigastric abdominal pain and chest pain.  Because of patient's obesity will have patient meet with anesthesia prior to the procedure in order to determine the best form of anesthesia for his EGD.    Past Medical History:   Past Medical History:   Diagnosis Date   • Allergic rhinitis    • Asthma    • Chest pain    • Chronic back pain    • Chronic pain    • Coronary artery disease    • Diabetes mellitus    • GERD (gastroesophageal reflux disease)    • Hyperlipidemia    • Hypertension    • Low back pain    • Morbid obesity    • Pulmonary embolism    • RLS (restless legs syndrome)    • Seizures    • Sleep apnea        Family History:  Family History   Problem Relation Age of Onset   • Cancer Other    • Coronary artery disease Other    • Diabetes Other    • Heart disease Other    • Hypertension Other        Social History:   reports that he has quit smoking. His smokeless tobacco use includes Snuff. He reports that he does not drink alcohol or use illicit drugs.    Medications:   Current Outpatient Prescriptions   Medication Sig Dispense Refill   • albuterol (PROVENTIL HFA;VENTOLIN HFA) 108 (90 BASE) MCG/ACT inhaler Inhale 2 puffs as needed for wheezing.     • amLODIPine (NORVASC) 10 MG tablet Take 1 tablet by mouth Every Night. 30 tablet 5   • Canagliflozin (INVOKANA) 300 MG tablet Take 300 mg by mouth Every Morning Before Breakfast for 90 days. 90 tablet 0   • clopidogrel (PLAVIX) 75 MG tablet  Take 1 tablet by mouth Daily. (Patient taking differently: Take 75 mg by mouth Every Night.) 30 tablet 1   • HYDROcodone-acetaminophen (NORCO)  MG per tablet Take  tablets by mouth Every 6 (Six) Hours As Needed.  0   • ipratropium-albuterol (DUO-NEB) 0.5-2.5 mg/mL nebulizer Inhale 3 mL Every 4 (Four) Hours As Needed.     • isosorbide mononitrate (IMDUR) 120 MG 24 hr tablet Take 1 tablet by mouth Every Morning. 30 tablet 5   • Liraglutide (VICTOZA) 18 MG/3ML solution pen-injector Inject 1.8 mg under the skin Daily.     • lisinopril (PRINIVIL,ZESTRIL) 40 MG tablet Take 1 tablet by mouth Every Morning. 30 tablet 5   • metFORMIN (GLUCOPHAGE) 1000 MG tablet Take 1 tablet by mouth 2 (Two) Times a Day With Meals. 60 tablet 5   • MethylPREDNISolone (MEDROL, ETELVINA,) 4 MG tablet Take as directed on package instructions. 21 tablet 0   • metoprolol succinate XL (TOPROL-XL) 100 MG 24 hr tablet Take 1 tablet by mouth Every Night. 30 tablet 5   • MICROLET LANCETS misc USE TO TEST BLOOD SUGAR EVERY DAY AND AS NEEDED     • nitroglycerin (NITROSTAT) 0.4 MG SL tablet Place 1 tablet under the tongue Every 5 (Five) Minutes As Needed for Chest Pain. 25 tablet 6   • ondansetron ODT (ZOFRAN-ODT) 4 MG disintegrating tablet Take 1 tablet by mouth Every 6 (Six) Hours As Needed for Nausea or Vomiting. 12 tablet 0   • pantoprazole (PROTONIX) 40 MG EC tablet Take 1 tablet by mouth Every Night. 30 tablet 5   • pramipexole (MIRAPEX) 1 MG tablet Take 2 tablets by mouth Every Night. Taking 2mg daily 30 tablet 5   • pravastatin (PRAVACHOL) 80 MG tablet Take 1 tablet by mouth Every Night. 30 tablet 5   • ranolazine (RANEXA) 1000 MG 12 hr tablet Take 1 tablet by mouth Every 12 (Twelve) Hours. 60 tablet 5   • rivaroxaban (XARELTO) 20 MG tablet Take 1 tablet by mouth Daily. (Patient taking differently: Take 20 mg by mouth Daily With Dinner.) 30 tablet 11   • sertraline (ZOLOFT) 25 MG tablet Take 1 tablet by mouth Daily. (Patient taking  "differently: Take 25 mg by mouth Every Night.) 30 tablet 5   • TiZANidine (ZANAFLEX) 2 MG capsule Take 1 capsule by mouth 3 (Three) Times a Day As Needed for Muscle Spasms. 60 capsule 1   • traZODone (DESYREL) 300 MG tablet Take 1 tablet by mouth Every Night. 30 tablet 5     No current facility-administered medications for this visit.        Allergies:  Glipizide; Phenergan [promethazine hcl]; Promethazine; Risperdal [risperidone]; Tramadol; and Reglan [metoclopramide]    ROS:    Review of Systems   Constitutional: Negative for activity change, appetite change, chills, diaphoresis, fatigue, fever and unexpected weight change.   HENT: Negative for sore throat and trouble swallowing.    Respiratory: Negative for shortness of breath.    Gastrointestinal: Positive for abdominal pain. Negative for abdominal distention, anal bleeding, blood in stool, constipation, diarrhea, nausea, rectal pain and vomiting.   Musculoskeletal: Negative for arthralgias.   Skin: Negative for pallor.   Neurological: Negative for light-headedness.     Objective     Blood pressure 158/64, pulse 82, height 71\" (180.3 cm), weight (!) 451 lb 8 oz (205 kg).    Physical Exam   Constitutional: He is oriented to person, place, and time. He appears well-developed and well-nourished. No distress.   HENT:   Head: Normocephalic and atraumatic.   Cardiovascular: Normal rate, regular rhythm, normal heart sounds and intact distal pulses.  Exam reveals no gallop and no friction rub.    No murmur heard.  Pulmonary/Chest: Breath sounds normal. No respiratory distress. He has no wheezes. He has no rales. He exhibits no tenderness.   Abdominal: Soft. Bowel sounds are normal. He exhibits no distension and no mass. There is no tenderness. There is no rebound and no guarding. No hernia.   Musculoskeletal: Normal range of motion. He exhibits no edema.   Neurological: He is alert and oriented to person, place, and time.   Skin: Skin is warm and dry. No rash noted. He " is not diaphoretic. No erythema. No pallor.   Psychiatric: He has a normal mood and affect. His behavior is normal. Judgment and thought content normal.        Assessment/Plan   Yordy was seen today for gi problem.    Diagnoses and all orders for this visit:    Epigastric pain  -     Case Request; Standing  -     sodium chloride 0.9 % flush 1-10 mL; Infuse 1-10 mL into a venous catheter As Needed for Line Care.  -     dextrose 5 % and sodium chloride 0.45 % infusion; Infuse 30 mL/hr into a venous catheter Continuous As Needed (Start Prior to Procedure).  -     Case Request    Other orders  -     Implement Anesthesia Orders Day of Procedure; Standing  -     Obtain Informed Consent; Standing  -     Insert Peripheral IV; Standing  -     Saline Lock & Maintain IV Access; Standing        ESOPHAGOGASTRODUODENOSCOPY (N/A)     Diagnosis Plan   1. Epigastric pain  Case Request    sodium chloride 0.9 % flush 1-10 mL    dextrose 5 % and sodium chloride 0.45 % infusion    Case Request       Anticipated Surgical Procedure:  No orders of the defined types were placed in this encounter.      The risks, benefits, and alternatives of this procedure have been discussed with the patient or the responsible party- the patient understands and agrees to proceed.

## 2017-07-31 ENCOUNTER — OFFICE VISIT (OUTPATIENT)
Dept: FAMILY MEDICINE CLINIC | Facility: CLINIC | Age: 37
End: 2017-07-31

## 2017-07-31 ENCOUNTER — HOSPITAL ENCOUNTER (OUTPATIENT)
Facility: HOSPITAL | Age: 37
Setting detail: HOSPITAL OUTPATIENT SURGERY
End: 2017-07-31
Attending: INTERNAL MEDICINE | Admitting: INTERNAL MEDICINE

## 2017-07-31 VITALS
BODY MASS INDEX: 44.1 KG/M2 | TEMPERATURE: 97.7 F | HEIGHT: 71 IN | OXYGEN SATURATION: 98 % | SYSTOLIC BLOOD PRESSURE: 134 MMHG | DIASTOLIC BLOOD PRESSURE: 88 MMHG | HEART RATE: 86 BPM | WEIGHT: 315 LBS

## 2017-07-31 DIAGNOSIS — R07.2 PRECORDIAL PAIN: Primary | ICD-10-CM

## 2017-07-31 PROCEDURE — 93000 ELECTROCARDIOGRAM COMPLETE: CPT | Performed by: NURSE PRACTITIONER

## 2017-07-31 PROCEDURE — 99213 OFFICE O/P EST LOW 20 MIN: CPT | Performed by: NURSE PRACTITIONER

## 2017-07-31 NOTE — PROGRESS NOTES
"Chief Complaint   Patient presents with   • Chest Pain     [facility] f/u, c/o chest pain that started at 3AM       Subjective   HPI   Yordy Hansen is a 36 y.o. male presents to the office for hospital follow-up. He was evaluated and treated at [facility] on 7/2/2017 for chest pain. He was d/c with no change in medication or POC.     He presents today with chest pain that began at 0300 this morning. 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  Pain is located at sternum and radiates to his right chest wall. Pain is initially described as worsening with deep inhalation, but he now denies aggravating factors and states pain is \"the same all the time\". He rates is pain 7/10. He denies pain over left chest wall, up neck, down either arm, N/V, diaphoresis, difficulty breathing, SOA, and syncope.     Patient declines use of Nitro tabs.     Chest Pain    This is a new problem. The current episode started today. The onset quality is sudden. The problem occurs constantly. The problem has been unchanged. The pain is present in the substernal region. The pain is at a severity of 7/10. The pain is severe. The quality of the pain is described as heavy. Radiates to: right chest. Pertinent negatives include no abdominal pain, cough, dizziness, fever, headaches, leg pain, malaise/fatigue, nausea, numbness, orthopnea, palpitations, PND, syncope, vomiting or weakness. The pain is aggravated by nothing. He has tried rest for the symptoms. The treatment provided no relief.       Family History   Problem Relation Age of Onset   • Cancer Other    • Coronary artery disease Other    • Diabetes Other    • Heart disease Other    • Hypertension Other      Social History     Social History   • Marital status:      Spouse name: Cori   • Number of children: 0   • Years of education: N/A     Occupational History   • Disabled      Social History Main Topics   • Smoking status: Former Smoker   • " Smokeless tobacco: Current User     Types: Snuff   • Alcohol use No   • Drug use: No   • Sexual activity: Defer     Other Topics Concern   • Not on file     Social History Narrative       The following portions of the patient's history were reviewed and updated as appropriate: allergies, current medications, past family history, past medical history, past social history, past surgical history and problem list.    Review of Systems   Constitutional: Positive for fatigue. Negative for activity change, appetite change, chills, fever, malaise/fatigue and unexpected weight change.   HENT: Negative.  Negative for congestion, drooling, facial swelling, postnasal drip, rhinorrhea, sinus pressure, sore throat, trouble swallowing and voice change.    Eyes: Negative.  Negative for photophobia, pain, discharge and visual disturbance.   Respiratory: Positive for wheezing. Negative for apnea, cough and choking.    Cardiovascular: Positive for chest pain. Negative for palpitations, orthopnea, leg swelling, syncope and PND.   Gastrointestinal: Negative.  Negative for abdominal distention, abdominal pain, constipation, diarrhea, nausea and vomiting.   Endocrine: Positive for polydipsia and polyuria. Negative for polyphagia.   Genitourinary: Negative.  Negative for decreased urine volume, difficulty urinating and dysuria.   Musculoskeletal: Negative.  Negative for arthralgias, gait problem and myalgias.   Skin: Negative.  Negative for rash and wound.   Allergic/Immunologic: Negative.    Neurological: Negative.  Negative for dizziness, syncope, weakness, light-headedness, numbness and headaches.   Hematological: Negative.    Psychiatric/Behavioral: Negative.  Negative for confusion, decreased concentration and sleep disturbance. The patient is not nervous/anxious.      14 Point ROS completed with pertinent positives discussed. All other systems reviewed and are negative.       Objective   Encounter Vitals  /88 (BP Location:  "Right arm, Patient Position: Sitting, Cuff Size: Large Adult)  Pulse 86  Temp 97.7 °F (36.5 °C) (Tympanic)   Ht 71\" (180.3 cm)  Wt (!) 453 lb (205 kg)  SpO2 98%  BMI 63.18 kg/m2    Physical Exam   Constitutional: He is oriented to person, place, and time. Vital signs are normal. He appears well-developed and well-nourished.  Non-toxic appearance. He appears distressed.   Morbid obesity   HENT:   Head: Normocephalic and atraumatic.   Right Ear: Tympanic membrane, external ear and ear canal normal.   Left Ear: Tympanic membrane, external ear and ear canal normal.   Nose: Nose normal.   Mouth/Throat: Uvula is midline, oropharynx is clear and moist and mucous membranes are normal. No posterior oropharyngeal edema or posterior oropharyngeal erythema.   Eyes: Lids are normal. Pupils are equal, round, and reactive to light.   Neck: Trachea normal and normal range of motion. Neck supple. No thyroid mass present.   Cardiovascular: Normal rate, regular rhythm, S1 normal, S2 normal, normal heart sounds and intact distal pulses.  PMI is displaced.  Exam reveals no gallop and no friction rub.    No murmur heard.      Pulmonary/Chest: Effort normal and breath sounds normal. No respiratory distress. He has no wheezes. He has no rales.   Labored breathing with speaking   Abdominal: Soft. Normal appearance and bowel sounds are normal. He exhibits no mass. There is no rebound and no guarding.   Musculoskeletal: Normal range of motion.   Lymphadenopathy:     He has no cervical adenopathy.   Neurological: He is alert and oriented to person, place, and time. He has normal strength. No cranial nerve deficit or sensory deficit. Gait normal.   Skin: Skin is warm and dry. No rash noted.   Psychiatric: He has a normal mood and affect. His speech is normal and behavior is normal. Judgment and thought content normal. Cognition and memory are normal.   Nursing note and vitals reviewed.      Pertinent Labs  Admission on 07/23/2017, " Discharged on 07/25/2017   Component Date Value Ref Range Status   • Troponin I 07/23/2017 <0.012  <=0.034 ng/mL Final   • Troponin I 07/24/2017 <0.012  <=0.034 ng/mL Final   • Glucose 07/23/2017 123* 60 - 100 mg/dL Final   • BUN 07/23/2017 11  7 - 21 mg/dL Final   • Creatinine 07/23/2017 0.73  0.70 - 1.30 mg/dL Final   • Sodium 07/23/2017 135* 137 - 145 mmol/L Final   • Potassium 07/23/2017 4.0  3.5 - 5.1 mmol/L Final   • Chloride 07/23/2017 101  95 - 110 mmol/L Final   • CO2 07/23/2017 23.0  22.0 - 31.0 mmol/L Final   • Calcium 07/23/2017 8.7  8.4 - 10.2 mg/dL Final   • Total Protein 07/23/2017 7.3  6.3 - 8.6 g/dL Final   • Albumin 07/23/2017 3.60  3.40 - 4.80 g/dL Final   • ALT (SGPT) 07/23/2017 32  21 - 72 U/L Final   • AST (SGOT) 07/23/2017 19  17 - 59 U/L Final   • Alkaline Phosphatase 07/23/2017 81  38 - 126 U/L Final   • Total Bilirubin 07/23/2017 0.4  0.2 - 1.3 mg/dL Final   • eGFR Non African Amer 07/23/2017 120  70 - 162 mL/min/1.73 Final   • Globulin 07/23/2017 3.7* 2.3 - 3.5 gm/dL Final   • A/G Ratio 07/23/2017 1.0* 1.1 - 1.8 g/dL Final   • BUN/Creatinine Ratio 07/23/2017 15.1  7.0 - 25.0 Final   • Anion Gap 07/23/2017 11.0  5.0 - 15.0 mmol/L Final   • WBC 07/23/2017 8.48  3.20 - 9.80 10*3/mm3 Final   • RBC 07/23/2017 4.68  4.37 - 5.74 10*6/mm3 Final   • Hemoglobin 07/23/2017 12.9* 13.7 - 17.3 g/dL Final   • Hematocrit 07/23/2017 37.8* 39.0 - 49.0 % Final   • MCV 07/23/2017 80.8  80.0 - 98.0 fL Final   • MCH 07/23/2017 27.6  26.5 - 34.0 pg Final   • MCHC 07/23/2017 34.1  31.5 - 36.3 g/dL Final   • RDW 07/23/2017 14.7* 11.5 - 14.5 % Final   • RDW-SD 07/23/2017 43.2  35.1 - 43.9 fl Final   • MPV 07/23/2017 8.9  8.0 - 12.0 fL Final   • Platelets 07/23/2017 184  150 - 450 10*3/mm3 Final   • Neutrophil % 07/23/2017 64.3  37.0 - 80.0 % Final   • Lymphocyte % 07/23/2017 22.4  10.0 - 50.0 % Final   • Monocyte % 07/23/2017 9.4  0.0 - 12.0 % Final   • Eosinophil % 07/23/2017 2.7  0.0 - 7.0 % Final   • Basophil %  07/23/2017 0.1  0.0 - 2.0 % Final   • Immature Grans % 07/23/2017 1.1* 0.0 - 0.5 % Final   • Neutrophils, Absolute 07/23/2017 5.45  2.00 - 8.60 10*3/mm3 Final   • Lymphocytes, Absolute 07/23/2017 1.90  0.60 - 4.20 10*3/mm3 Final   • Monocytes, Absolute 07/23/2017 0.80  0.00 - 0.90 10*3/mm3 Final   • Eosinophils, Absolute 07/23/2017 0.23  0.00 - 0.70 10*3/mm3 Final   • Basophils, Absolute 07/23/2017 0.01  0.00 - 0.20 10*3/mm3 Final   • Immature Grans, Absolute 07/23/2017 0.09* 0.00 - 0.02 10*3/mm3 Final   • Extra Tube 07/23/2017 hold for add-on   Final    Auto resulted   • Extra Tube 07/23/2017 Hold for add-ons.   Final    Auto resulted.   • Glucose 07/23/2017 125  70 - 130 mg/dL Final    RN NotifiedMeter: [id number]: [id number] JUWAN THOMAS   • Glucose 07/24/2017 175* 60 - 100 mg/dL Final   • BUN 07/24/2017 12  7 - 21 mg/dL Final   • Creatinine 07/24/2017 0.76  0.70 - 1.30 mg/dL Final   • Sodium 07/24/2017 133* 137 - 145 mmol/L Final   • Potassium 07/24/2017 4.1  3.5 - 5.1 mmol/L Final   • Chloride 07/24/2017 100  95 - 110 mmol/L Final   • CO2 07/24/2017 24.0  22.0 - 31.0 mmol/L Final   • Calcium 07/24/2017 8.6  8.4 - 10.2 mg/dL Final   • Total Protein 07/24/2017 6.9  6.3 - 8.6 g/dL Final   • Albumin 07/24/2017 3.40  3.40 - 4.80 g/dL Final   • ALT (SGPT) 07/24/2017 28  21 - 72 U/L Final   • AST (SGOT) 07/24/2017 22  17 - 59 U/L Final   • Alkaline Phosphatase 07/24/2017 75  38 - 126 U/L Final   • Total Bilirubin 07/24/2017 0.4  0.2 - 1.3 mg/dL Final   • eGFR Non African Amer 07/24/2017 115  >60 mL/min/1.73 Final   • Globulin 07/24/2017 3.5  2.3 - 3.5 gm/dL Final   • A/G Ratio 07/24/2017 1.0* 1.1 - 1.8 g/dL Final   • BUN/Creatinine Ratio 07/24/2017 15.8  7.0 - 25.0 Final   • Anion Gap 07/24/2017 9.0  5.0 - 15.0 mmol/L Final   • WBC 07/24/2017 8.77  3.20 - 9.80 10*3/mm3 Final   • RBC 07/24/2017 4.59  4.37 - 5.74 10*6/mm3 Final   • Hemoglobin 07/24/2017 12.3* 13.7 - 17.3 g/dL Final   • Hematocrit  07/24/2017 38.2* 39.0 - 49.0 % Final   • MCV 07/24/2017 83.2  80.0 - 98.0 fL Final   • MCH 07/24/2017 26.8  26.5 - 34.0 pg Final   • MCHC 07/24/2017 32.2  31.5 - 36.3 g/dL Final   • RDW 07/24/2017 14.6* 11.5 - 14.5 % Final   • RDW-SD 07/24/2017 44.1* 35.1 - 43.9 fl Final   • MPV 07/24/2017 9.2  8.0 - 12.0 fL Final   • Platelets 07/24/2017 169  150 - 450 10*3/mm3 Final   • Neutrophil % 07/24/2017 63.5  37.0 - 80.0 % Final   • Lymphocyte % 07/24/2017 21.4  10.0 - 50.0 % Final   • Monocyte % 07/24/2017 10.8  0.0 - 12.0 % Final   • Eosinophil % 07/24/2017 3.2  0.0 - 7.0 % Final   • Basophil % 07/24/2017 0.2  0.0 - 2.0 % Final   • Immature Grans % 07/24/2017 0.9* 0.0 - 0.5 % Final   • Neutrophils, Absolute 07/24/2017 5.56  2.00 - 8.60 10*3/mm3 Final   • Lymphocytes, Absolute 07/24/2017 1.88  0.60 - 4.20 10*3/mm3 Final   • Monocytes, Absolute 07/24/2017 0.95* 0.00 - 0.90 10*3/mm3 Final   • Eosinophils, Absolute 07/24/2017 0.28  0.00 - 0.70 10*3/mm3 Final   • Basophils, Absolute 07/24/2017 0.02  0.00 - 0.20 10*3/mm3 Final   • Immature Grans, Absolute 07/24/2017 0.08* 0.00 - 0.02 10*3/mm3 Final   • Troponin I 07/24/2017 <0.012  <=0.034 ng/mL Final   • Glucose 07/24/2017 183* 70 - 130 mg/dL Final    RN NotifiedMeter: [id number]: [id number] [facility]   • Troponin I 07/24/2017 <0.012  <=0.034 ng/mL Final   • Creatine Kinase 07/24/2017 110  55 - 170 U/L Final   • CKMB 07/24/2017 0.96  0.00 - 5.00 ng/mL Final   • Glucose 07/24/2017 180* 70 - 130 mg/dL Final    RN NotifiedMeter: [id number]: [id number] LISA ELIAS   • MV E max silvia 07/24/2017 112.8  cm/sec Preliminary   • MV A max silvia 07/24/2017 99.2  cm/sec Preliminary   • MV E/A 07/24/2017 1.1   Preliminary   • Ao pk silvia 07/24/2017 160.2  cm/sec Preliminary   • Ao max PG 07/24/2017 10.3  mmHg Preliminary   • Ao V2 mean 07/24/2017 121.7  cm/sec Preliminary   • Ao mean PG 07/24/2017 6.8  mmHg Preliminary   • Ao V2 VTI 07/24/2017 33.4  cm Preliminary   •  MR max silvia 07/24/2017 249.8  cm/sec Preliminary   • MR max PG 07/24/2017 25.0  mmHg Preliminary   • PA V2 max 07/24/2017 124.9  cm/sec Preliminary   • PA max PG 07/24/2017 6.2  mmHg Preliminary   • TR max silvia 07/24/2017 242.5  cm/sec Preliminary   • RVSP(TR) 07/24/2017 33.6  mmHg Preliminary   • RAP systole 07/24/2017 10.0  mmHg Preliminary   • Glucose 07/24/2017 262* 70 - 130 mg/dL Final    RN NotifiedMeter: [id number]: [id number] LISA ELIAS   • Glucose 07/24/2017 165* 70 - 130 mg/dL Final    RN NotifiedMeter: [id number]: [id number] JUWAN THOMAS   • Glucose 07/25/2017 150* 70 - 130 mg/dL Final    RN NotifiedMeter: [id number]: [id number] JUWAN THOMAS   • Glucose 07/25/2017 188* 70 - 130 mg/dL Final    RN NotifiedMeter: [id number]: [id number] LISA ELIAS   Admission on 07/16/2017, Discharged on 07/16/2017   Component Date Value Ref Range Status   • Troponin I 07/16/2017 <0.012  <=0.034 ng/mL Final   • Troponin I 07/16/2017 <0.012  <=0.034 ng/mL Final   • Glucose 07/16/2017 282* 60 - 100 mg/dL Final   • BUN 07/16/2017 8  7 - 21 mg/dL Final   • Creatinine 07/16/2017 0.69* 0.70 - 1.30 mg/dL Final   • Sodium 07/16/2017 133* 137 - 145 mmol/L Final   • Potassium 07/16/2017 4.0  3.5 - 5.1 mmol/L Final   • Chloride 07/16/2017 100  95 - 110 mmol/L Final   • CO2 07/16/2017 26.0  22.0 - 31.0 mmol/L Final   • Calcium 07/16/2017 8.0* 8.4 - 10.2 mg/dL Final   • Total Protein 07/16/2017 7.1  6.3 - 8.6 g/dL Final   • Albumin 07/16/2017 3.60  3.40 - 4.80 g/dL Final   • ALT (SGPT) 07/16/2017 42  21 - 72 U/L Final   • AST (SGOT) 07/16/2017 28  17 - 59 U/L Final   • Alkaline Phosphatase 07/16/2017 88  38 - 126 U/L Final   • Total Bilirubin 07/16/2017 0.4  0.2 - 1.3 mg/dL Final   • eGFR Non African Amer 07/16/2017 128  70 - 162 mL/min/1.73 Final   • Globulin 07/16/2017 3.5  2.3 - 3.5 gm/dL Final   • A/G Ratio 07/16/2017 1.0* 1.1 - 1.8 g/dL Final   • BUN/Creatinine Ratio 07/16/2017 11.6   7.0 - 25.0 Final   • Anion Gap 07/16/2017 7.0  5.0 - 15.0 mmol/L Final   • proBNP 07/16/2017 47.8  0.0 - 450.0 pg/mL Final   • Extra Tube 07/16/2017 hold for add-on   Final    Auto resulted   • Extra Tube 07/16/2017 Hold for add-ons.   Final    Auto resulted.   • Extra Tube 07/16/2017 hold for add-on   Final    Auto resulted   • Extra Tube 07/16/2017 Hold for add-ons.   Final    Auto resulted.   • WBC 07/16/2017 7.64  3.20 - 9.80 10*3/mm3 Final   • RBC 07/16/2017 4.83  4.37 - 5.74 10*6/mm3 Final   • Hemoglobin 07/16/2017 13.2* 13.7 - 17.3 g/dL Final   • Hematocrit 07/16/2017 39.8  39.0 - 49.0 % Final   • MCV 07/16/2017 82.4  80.0 - 98.0 fL Final   • MCH 07/16/2017 27.3  26.5 - 34.0 pg Final   • MCHC 07/16/2017 33.2  31.5 - 36.3 g/dL Final   • RDW 07/16/2017 14.2  11.5 - 14.5 % Final   • RDW-SD 07/16/2017 42.9  35.1 - 43.9 fl Final   • MPV 07/16/2017 9.0  8.0 - 12.0 fL Final   • Platelets 07/16/2017 165  150 - 450 10*3/mm3 Final   • Neutrophil % 07/16/2017 62.2  37.0 - 80.0 % Final   • Lymphocyte % 07/16/2017 21.6  10.0 - 50.0 % Final   • Monocyte % 07/16/2017 9.8  0.0 - 12.0 % Final   • Eosinophil % 07/16/2017 4.2  0.0 - 7.0 % Final   • Basophil % 07/16/2017 0.1  0.0 - 2.0 % Final   • Immature Grans % 07/16/2017 2.1* 0.0 - 0.5 % Final   • Neutrophils, Absolute 07/16/2017 4.75  2.00 - 8.60 10*3/mm3 Final   • Lymphocytes, Absolute 07/16/2017 1.65  0.60 - 4.20 10*3/mm3 Final   • Monocytes, Absolute 07/16/2017 0.75  0.00 - 0.90 10*3/mm3 Final   • Eosinophils, Absolute 07/16/2017 0.32  0.00 - 0.70 10*3/mm3 Final   • Basophils, Absolute 07/16/2017 0.01  0.00 - 0.20 10*3/mm3 Final   • Immature Grans, Absolute 07/16/2017 0.16* 0.00 - 0.02 10*3/mm3 Final   Admission on 06/03/2017, Discharged on 06/04/2017   Component Date Value Ref Range Status   • Troponin I 06/03/2017 <0.012  <=0.034 ng/mL Final   • Troponin I 06/03/2017 <0.012  <=0.034 ng/mL Final   • Glucose 06/03/2017 268* 60 - 100 mg/dL Final   • BUN 06/03/2017 3* 7 -  21 mg/dL Final   • Creatinine 06/03/2017 0.67* 0.70 - 1.30 mg/dL Final   • Sodium 06/03/2017 137  137 - 145 mmol/L Final   • Potassium 06/03/2017 3.6  3.5 - 5.1 mmol/L Final   • Chloride 06/03/2017 102  95 - 110 mmol/L Final   • CO2 06/03/2017 24.0  22.0 - 31.0 mmol/L Final   • Calcium 06/03/2017 8.3* 8.4 - 10.2 mg/dL Final   • Total Protein 06/03/2017 7.0  6.3 - 8.6 g/dL Final   • Albumin 06/03/2017 3.50  3.40 - 4.80 g/dL Final   • ALT (SGPT) 06/03/2017 47  21 - 72 U/L Final   • AST (SGOT) 06/03/2017 22  17 - 59 U/L Final   • Alkaline Phosphatase 06/03/2017 62  38 - 126 U/L Final   • Total Bilirubin 06/03/2017 0.3  0.2 - 1.3 mg/dL Final   • eGFR Non African Amer 06/03/2017 133  70 - 162 mL/min/1.73 Final   • Globulin 06/03/2017 3.5  2.3 - 3.5 gm/dL Final   • A/G Ratio 06/03/2017 1.0* 1.1 - 1.8 g/dL Final   • BUN/Creatinine Ratio 06/03/2017 4.5* 7.0 - 25.0 Final   • Anion Gap 06/03/2017 11.0  5.0 - 15.0 mmol/L Final   • proBNP 06/03/2017 86.9  0.0 - 450.0 pg/mL Final   • Protime 06/03/2017 12.3  11.1 - 15.3 Seconds Final   • INR 06/03/2017 0.92  0.80 - 1.20 Final   • Extra Tube 06/03/2017 hold for add-on   Final    Auto resulted   • Extra Tube 06/03/2017 Hold for add-ons.   Final    Auto resulted.   • Extra Tube 06/03/2017 hold for add-on   Final    Auto resulted   • Extra Tube 06/03/2017 Hold for add-ons.   Final    Auto resulted.   • WBC 06/03/2017 7.37  3.20 - 9.80 10*3/mm3 Final   • RBC 06/03/2017 4.37  4.37 - 5.74 10*6/mm3 Final   • Hemoglobin 06/03/2017 11.9* 13.7 - 17.3 g/dL Final   • Hematocrit 06/03/2017 36.7* 39.0 - 49.0 % Final   • MCV 06/03/2017 84.0  80.0 - 98.0 fL Final   • MCH 06/03/2017 27.2  26.5 - 34.0 pg Final   • MCHC 06/03/2017 32.4  31.5 - 36.3 g/dL Final   • RDW 06/03/2017 14.6* 11.5 - 14.5 % Final   • RDW-SD 06/03/2017 44.4* 35.1 - 43.9 fl Final   • MPV 06/03/2017 9.1  8.0 - 12.0 fL Final   • Platelets 06/03/2017 157  150 - 450 10*3/mm3 Final   • Neutrophil % 06/03/2017 69.7  37.0 - 80.0 %  Final   • Lymphocyte % 06/03/2017 16.8  10.0 - 50.0 % Final   • Monocyte % 06/03/2017 8.7  0.0 - 12.0 % Final   • Eosinophil % 06/03/2017 3.9  0.0 - 7.0 % Final   • Basophil % 06/03/2017 0.1  0.0 - 2.0 % Final   • Immature Grans % 06/03/2017 0.8* 0.0 - 0.5 % Final   • Neutrophils, Absolute 06/03/2017 5.13  2.00 - 8.60 10*3/mm3 Final   • Lymphocytes, Absolute 06/03/2017 1.24  0.60 - 4.20 10*3/mm3 Final   • Monocytes, Absolute 06/03/2017 0.64  0.00 - 0.90 10*3/mm3 Final   • Eosinophils, Absolute 06/03/2017 0.29  0.00 - 0.70 10*3/mm3 Final   • Basophils, Absolute 06/03/2017 0.01  0.00 - 0.20 10*3/mm3 Final   • Immature Grans, Absolute 06/03/2017 0.06* 0.00 - 0.02 10*3/mm3 Final   • Creatine Kinase 06/03/2017 229* 55 - 170 U/L Final   • CKMB 06/03/2017 1.93  0.00 - 5.00 ng/mL Final   • Magnesium 06/03/2017 1.9  1.6 - 2.3 mg/dL Final   • Glucose 06/04/2017 143* 60 - 100 mg/dL Final   • BUN 06/04/2017 3* 7 - 21 mg/dL Final   • Creatinine 06/04/2017 0.67* 0.70 - 1.30 mg/dL Final   • Sodium 06/04/2017 137  137 - 145 mmol/L Final   • Potassium 06/04/2017 3.6  3.5 - 5.1 mmol/L Final   • Chloride 06/04/2017 102  95 - 110 mmol/L Final   • CO2 06/04/2017 26.0  22.0 - 31.0 mmol/L Final   • Calcium 06/04/2017 8.6  8.4 - 10.2 mg/dL Final   • eGFR Non African Amer 06/04/2017 133  >60 mL/min/1.73 Final   • BUN/Creatinine Ratio 06/04/2017 4.5* 7.0 - 25.0 Final   • Anion Gap 06/04/2017 9.0  5.0 - 15.0 mmol/L Final   • Glucose 06/04/2017 146* 60 - 100 mg/dL Final   • BUN 06/04/2017 4* 7 - 21 mg/dL Final   • Creatinine 06/04/2017 0.71  0.70 - 1.30 mg/dL Final   • Sodium 06/04/2017 136* 137 - 145 mmol/L Final   • Potassium 06/04/2017 3.9  3.5 - 5.1 mmol/L Final   • Chloride 06/04/2017 100  95 - 110 mmol/L Final   • CO2 06/04/2017 28.0  22.0 - 31.0 mmol/L Final   • Calcium 06/04/2017 8.5  8.4 - 10.2 mg/dL Final   • eGFR Non African Amer 06/04/2017 124  >60 mL/min/1.73 Final   • BUN/Creatinine Ratio 06/04/2017 5.6* 7.0 - 25.0 Final   •  Anion Gap 06/04/2017 8.0  5.0 - 15.0 mmol/L Final   • Magnesium 06/04/2017 1.8  1.6 - 2.3 mg/dL Final   • Magnesium 06/04/2017 1.9  1.6 - 2.3 mg/dL Final   • Troponin I 06/04/2017 <0.012  <=0.034 ng/mL Final   • WBC 06/04/2017 7.90  3.20 - 9.80 10*3/mm3 Final   • RBC 06/04/2017 4.33* 4.37 - 5.74 10*6/mm3 Final   • Hemoglobin 06/04/2017 11.9* 13.7 - 17.3 g/dL Final   • Hematocrit 06/04/2017 36.0* 39.0 - 49.0 % Final   • MCV 06/04/2017 83.1  80.0 - 98.0 fL Final   • MCH 06/04/2017 27.5  26.5 - 34.0 pg Final   • MCHC 06/04/2017 33.1  31.5 - 36.3 g/dL Final   • RDW 06/04/2017 14.6* 11.5 - 14.5 % Final   • RDW-SD 06/04/2017 44.1* 35.1 - 43.9 fl Final   • MPV 06/04/2017 9.3  8.0 - 12.0 fL Final   • Platelets 06/04/2017 170  150 - 450 10*3/mm3 Final   • Neutrophil % 06/04/2017 64.1  37.0 - 80.0 % Final   • Lymphocyte % 06/04/2017 21.6  10.0 - 50.0 % Final   • Monocyte % 06/04/2017 8.5  0.0 - 12.0 % Final   • Eosinophil % 06/04/2017 4.6  0.0 - 7.0 % Final   • Basophil % 06/04/2017 0.1  0.0 - 2.0 % Final   • Immature Grans % 06/04/2017 1.1* 0.0 - 0.5 % Final   • Neutrophils, Absolute 06/04/2017 5.06  2.00 - 8.60 10*3/mm3 Final   • Lymphocytes, Absolute 06/04/2017 1.71  0.60 - 4.20 10*3/mm3 Final   • Monocytes, Absolute 06/04/2017 0.67  0.00 - 0.90 10*3/mm3 Final   • Eosinophils, Absolute 06/04/2017 0.36  0.00 - 0.70 10*3/mm3 Final   • Basophils, Absolute 06/04/2017 0.01  0.00 - 0.20 10*3/mm3 Final   • Immature Grans, Absolute 06/04/2017 0.09* 0.00 - 0.02 10*3/mm3 Final   • WBC 06/04/2017 8.75  3.20 - 9.80 10*3/mm3 Final   • RBC 06/04/2017 4.50  4.37 - 5.74 10*6/mm3 Final   • Hemoglobin 06/04/2017 12.5* 13.7 - 17.3 g/dL Final   • Hematocrit 06/04/2017 37.3* 39.0 - 49.0 % Final   • MCV 06/04/2017 82.9  80.0 - 98.0 fL Final   • MCH 06/04/2017 27.8  26.5 - 34.0 pg Final   • MCHC 06/04/2017 33.5  31.5 - 36.3 g/dL Final   • RDW 06/04/2017 14.9* 11.5 - 14.5 % Final   • RDW-SD 06/04/2017 45.3* 35.1 - 43.9 fl Final   • MPV 06/04/2017  9.1  8.0 - 12.0 fL Final   • Platelets 06/04/2017 199  150 - 450 10*3/mm3 Final   • Neutrophil % 06/04/2017 64.8  37.0 - 80.0 % Final   • Lymphocyte % 06/04/2017 20.1  10.0 - 50.0 % Final   • Monocyte % 06/04/2017 9.0  0.0 - 12.0 % Final   • Eosinophil % 06/04/2017 4.7  0.0 - 7.0 % Final   • Basophil % 06/04/2017 0.3  0.0 - 2.0 % Final   • Immature Grans % 06/04/2017 1.1* 0.0 - 0.5 % Final   • Neutrophils, Absolute 06/04/2017 5.66  2.00 - 8.60 10*3/mm3 Final   • Lymphocytes, Absolute 06/04/2017 1.76  0.60 - 4.20 10*3/mm3 Final   • Monocytes, Absolute 06/04/2017 0.79  0.00 - 0.90 10*3/mm3 Final   • Eosinophils, Absolute 06/04/2017 0.41  0.00 - 0.70 10*3/mm3 Final   • Basophils, Absolute 06/04/2017 0.03  0.00 - 0.20 10*3/mm3 Final   • Immature Grans, Absolute 06/04/2017 0.10* 0.00 - 0.02 10*3/mm3 Final   • Glucose 06/04/2017 151* 70 - 130 mg/dL Final    RN NotifiedMeter: [id number]: [id number] PERRY [place]   Office Visit on 06/01/2017   Component Date Value Ref Range Status   • Glucose 07/11/2017 168* 70 - 100 mg/dL Final   • BUN 07/11/2017 10  8 - 25 mg/dL Final   • Creatinine 07/11/2017 0.70  0.40 - 1.30 mg/dL Final   • Sodium 07/11/2017 135  134 - 146 mmol/L Final   • Potassium 07/11/2017 4.3  3.4 - 5.4 mmol/L Final   • Chloride 07/11/2017 100  100 - 112 mmol/L Final   • CO2 07/11/2017 25.0  20.0 - 32.0 mmol/L Final   • Calcium 07/11/2017 9.2  8.4 - 10.8 mg/dL Final   • Total Protein 07/11/2017 7.9  6.7 - 8.2 g/dL Final   • Albumin 07/11/2017 3.70  3.20 - 5.50 g/dL Final   • ALT (SGPT) 07/11/2017 35  10 - 60 U/L Final   • AST (SGOT) 07/11/2017 33  10 - 60 U/L Final   • Alkaline Phosphatase 07/11/2017 70  15 - 121 U/L Final   • Total Bilirubin 07/11/2017 0.5  0.2 - 1.0 mg/dL Final   • eGFR Non African Amer 07/11/2017 126  70 - 162 mL/min/1.73 Final   • Globulin 07/11/2017 4.2  2.5 - 4.6 gm/dL Final   • A/G Ratio 07/11/2017 0.9* 1.0 - 3.0 g/dL Final   • BUN/Creatinine Ratio 07/11/2017 14.3  7.0 - 25.0 Final    • Anion Gap 07/11/2017 10.0  5.0 - 15.0 mmol/L Final   • Hemoglobin A1C 07/11/2017 7.93* 4 - 5.6 % Final   • Total Cholesterol 07/11/2017 192  150 - 200 mg/dL Final   • Triglycerides 07/11/2017 246* 35 - 160 mg/dL Final   • HDL Cholesterol 07/11/2017 38  35 - 100 mg/dL Final   • LDL Cholesterol  07/11/2017 105  mg/dL Final   • VLDL Cholesterol 07/11/2017 49.2  mg/dL Final   • LDL/HDL Ratio 07/11/2017 2.76   Final   • TSH 07/11/2017 2.540  0.460 - 4.680 mIU/mL Final   • Free T4 07/11/2017 1.34  0.78 - 2.19 ng/dL Final   • Vitamin B-12 07/11/2017 354  239 - 931 pg/mL Final   • Microalbumin/Creatinine Ratio 07/11/2017 587.8* 0.0 - 30.0 mg/g Final   • Creatinine, Urine 07/11/2017 59.2  mg/dL Final   • Microalbumin, Urine 07/11/2017 34.8  mg/L Final   • WBC 07/11/2017 8.89  3.20 - 9.80 10*3/mm3 Final   • RBC 07/11/2017 5.25  4.37 - 5.74 10*6/mm3 Final   • Hemoglobin 07/11/2017 14.4  13.7 - 17.3 g/dL Final   • Hematocrit 07/11/2017 44.0  39.0 - 49.0 % Final   • MCV 07/11/2017 83.8  80.0 - 98.0 fL Final   • MCH 07/11/2017 27.4  26.5 - 34.0 pg Final   • MCHC 07/11/2017 32.7  31.5 - 36.3 g/dL Final   • RDW 07/11/2017 14.8* 11.5 - 14.5 % Final   • RDW-SD 07/11/2017 44.9* 35.1 - 43.9 fl Final   • MPV 07/11/2017 9.3  8.0 - 12.0 fL Final   • Platelets 07/11/2017 222  150 - 450 10*3/mm3 Final   • Neutrophil % 07/11/2017 67.0  37.0 - 80.0 % Final   • Lymphocyte % 07/11/2017 19.0  10.0 - 50.0 % Final   • Monocyte % 07/11/2017 9.0  0.0 - 12.0 % Final   • Eosinophil % 07/11/2017 5.0  0.0 - 7.0 % Final   • Neutrophils Absolute 07/11/2017 5.96  2.00 - 8.60 10*3/mm3 Final   • Lymphocytes Absolute 07/11/2017 1.69  0.60 - 4.20 10*3/mm3 Final   • Monocytes Absolute 07/11/2017 0.80  0.00 - 0.90 10*3/mm3 Final   • Eosinophils Absolute 07/11/2017 0.44  0.00 - 0.70 10*3/mm3 Final   • Anisocytosis 07/11/2017 Slight/1+  None Seen Final   • WBC Morphology 07/11/2017 Normal  Normal Final   • Platelet Estimate 07/11/2017 Adequate  Normal Final    Admission on 05/09/2017, Discharged on 05/10/2017   Component Date Value Ref Range Status   • Troponin I 05/09/2017 <0.012  <=0.034 ng/mL Final   • Troponin I 05/09/2017 <0.012  <=0.034 ng/mL Final   • Glucose 05/09/2017 119* 60 - 100 mg/dL Final   • BUN 05/09/2017 12  7 - 21 mg/dL Final   • Creatinine 05/09/2017 0.77  0.70 - 1.30 mg/dL Final   • Sodium 05/09/2017 134* 137 - 145 mmol/L Final   • Potassium 05/09/2017 3.7  3.5 - 5.1 mmol/L Final   • Chloride 05/09/2017 96  95 - 110 mmol/L Final   • CO2 05/09/2017 27.0  22.0 - 31.0 mmol/L Final   • Calcium 05/09/2017 8.7  8.4 - 10.2 mg/dL Final   • Total Protein 05/09/2017 7.8  6.3 - 8.6 g/dL Final   • Albumin 05/09/2017 4.00  3.40 - 4.80 g/dL Final   • ALT (SGPT) 05/09/2017 29  21 - 72 U/L Final   • AST (SGOT) 05/09/2017 31  17 - 59 U/L Final   • Alkaline Phosphatase 05/09/2017 76  38 - 126 U/L Final   • Total Bilirubin 05/09/2017 0.5  0.2 - 1.3 mg/dL Final   • eGFR Non African Amer 05/09/2017 113  70 - 162 mL/min/1.73 Final   • Globulin 05/09/2017 3.8* 2.3 - 3.5 gm/dL Final   • A/G Ratio 05/09/2017 1.1  1.1 - 1.8 g/dL Final   • BUN/Creatinine Ratio 05/09/2017 15.6  7.0 - 25.0 Final   • Anion Gap 05/09/2017 11.0  5.0 - 15.0 mmol/L Final   • proBNP 05/09/2017 29.7  0.0 - 450.0 pg/mL Final   • Protime 05/09/2017 13.2  11.1 - 15.3 Seconds Final   • INR 05/09/2017 1.01  0.80 - 1.20 Final   • Extra Tube 05/09/2017 hold for add-on   Final    Auto resulted   • Extra Tube 05/09/2017 Hold for add-ons.   Final    Auto resulted.   • Extra Tube 05/09/2017 hold for add-on   Final    Auto resulted   • Extra Tube 05/09/2017 Hold for add-ons.   Final    Auto resulted.   • WBC 05/09/2017 9.77  3.20 - 9.80 10*3/mm3 Final   • RBC 05/09/2017 4.48  4.37 - 5.74 10*6/mm3 Final   • Hemoglobin 05/09/2017 12.5* 13.7 - 17.3 g/dL Final   • Hematocrit 05/09/2017 36.4* 39.0 - 49.0 % Final   • MCV 05/09/2017 81.3  80.0 - 98.0 fL Final   • MCH 05/09/2017 27.9  26.5 - 34.0 pg Final   • MCHC  05/09/2017 34.3  31.5 - 36.3 g/dL Final   • RDW 05/09/2017 14.1  11.5 - 14.5 % Final   • RDW-SD 05/09/2017 41.6  35.1 - 43.9 fl Final   • MPV 05/09/2017 8.6  8.0 - 12.0 fL Final   • Platelets 05/09/2017 206  150 - 450 10*3/mm3 Final   • Neutrophil % 05/09/2017 73.3  37.0 - 80.0 % Final   • Lymphocyte % 05/09/2017 14.7  10.0 - 50.0 % Final   • Monocyte % 05/09/2017 7.5  0.0 - 12.0 % Final   • Eosinophil % 05/09/2017 3.0  0.0 - 7.0 % Final   • Basophil % 05/09/2017 0.2  0.0 - 2.0 % Final   • Immature Grans % 05/09/2017 1.3* 0.0 - 0.5 % Final   • Neutrophils, Absolute 05/09/2017 7.16  2.00 - 8.60 10*3/mm3 Final   • Lymphocytes, Absolute 05/09/2017 1.44  0.60 - 4.20 10*3/mm3 Final   • Monocytes, Absolute 05/09/2017 0.73  0.00 - 0.90 10*3/mm3 Final   • Eosinophils, Absolute 05/09/2017 0.29  0.00 - 0.70 10*3/mm3 Final   • Basophils, Absolute 05/09/2017 0.02  0.00 - 0.20 10*3/mm3 Final   • Immature Grans, Absolute 05/09/2017 0.13* 0.00 - 0.02 10*3/mm3 Final   • Troponin I 05/10/2017 <0.012  <=0.034 ng/mL Final   • Creatine Kinase 05/09/2017 200* 55 - 170 U/L Final   • Creatine Kinase 05/10/2017 180* 55 - 170 U/L Final   • CKMB 05/10/2017 1.26  0.00 - 5.00 ng/mL Final   • Color, UA 05/10/2017 Yellow  Yellow, Straw, Dark Yellow, Suni Final   • Appearance, UA 05/10/2017 Clear  Clear Final   • pH, UA 05/10/2017 6.0  5.0 - 9.0 Final   • Specific Gravity, UA 05/10/2017 1.026  1.003 - 1.030 Final   • Glucose, UA 05/10/2017 100 mg/dL (Trace)* Negative Final   • Ketones, UA 05/10/2017 Negative  Negative Final   • Bilirubin, UA 05/10/2017 Negative  Negative Final   • Blood, UA 05/10/2017 Negative  Negative Final   • Protein, UA 05/10/2017 30 mg/dL (1+)* Negative Final   • Leuk Esterase, UA 05/10/2017 Negative  Negative Final   • Nitrite, UA 05/10/2017 Negative  Negative Final   • Urobilinogen, UA 05/10/2017 0.2 E.U./dL  0.2 - 1.0 E.U./dL Final   • Glucose 05/10/2017 167* 60 - 100 mg/dL Final   • BUN 05/10/2017 12  7 - 21 mg/dL  Final   • Creatinine 05/10/2017 0.76  0.70 - 1.30 mg/dL Final   • Sodium 05/10/2017 134* 137 - 145 mmol/L Final   • Potassium 05/10/2017 3.7  3.5 - 5.1 mmol/L Final   • Chloride 05/10/2017 98  95 - 110 mmol/L Final   • CO2 05/10/2017 25.0  22.0 - 31.0 mmol/L Final   • Calcium 05/10/2017 8.4  8.4 - 10.2 mg/dL Final   • Total Protein 05/10/2017 7.2  6.3 - 8.6 g/dL Final   • Albumin 05/10/2017 3.70  3.40 - 4.80 g/dL Final   • ALT (SGPT) 05/10/2017 57  21 - 72 U/L Final   • AST (SGOT) 05/10/2017 84* 17 - 59 U/L Final   • Alkaline Phosphatase 05/10/2017 74  38 - 126 U/L Final   • Total Bilirubin 05/10/2017 0.6  0.2 - 1.3 mg/dL Final   • eGFR Non African Amer 05/10/2017 115  >60 mL/min/1.73 Final   • Globulin 05/10/2017 3.5  2.3 - 3.5 gm/dL Final   • A/G Ratio 05/10/2017 1.1  1.1 - 1.8 g/dL Final   • BUN/Creatinine Ratio 05/10/2017 15.8  7.0 - 25.0 Final   • Anion Gap 05/10/2017 11.0  5.0 - 15.0 mmol/L Final   • Magnesium 05/10/2017 1.8  1.6 - 2.3 mg/dL Final   • Color, UA 05/10/2017 Yellow  Yellow, Straw, Dark Yellow, Suni Final   • Appearance, UA 05/10/2017 Clear  Clear Final   • pH, UA 05/10/2017 6.0  5.0 - 9.0 Final   • Specific Gravity, UA 05/10/2017 1.026  1.003 - 1.030 Final   • Glucose, UA 05/10/2017 100 mg/dL (Trace)* Negative Final   • Ketones, UA 05/10/2017 Negative  Negative Final   • Bilirubin, UA 05/10/2017 Negative  Negative Final   • Blood, UA 05/10/2017 Negative  Negative Final   • Protein, UA 05/10/2017 30 mg/dL (1+)* Negative Final   • Leuk Esterase, UA 05/10/2017 Negative  Negative Final   • Nitrite, UA 05/10/2017 Negative  Negative Final   • Urobilinogen, UA 05/10/2017 0.2 E.U./dL  0.2 - 1.0 E.U./dL Final   • WBC 05/10/2017 6.96  3.20 - 9.80 10*3/mm3 Final   • RBC 05/10/2017 4.45  4.37 - 5.74 10*6/mm3 Final   • Hemoglobin 05/10/2017 12.3* 13.7 - 17.3 g/dL Final   • Hematocrit 05/10/2017 35.9* 39.0 - 49.0 % Final   • MCV 05/10/2017 80.7  80.0 - 98.0 fL Final   • MCH 05/10/2017 27.6  26.5 - 34.0 pg  Final   • MCHC 05/10/2017 34.3  31.5 - 36.3 g/dL Final   • RDW 05/10/2017 14.3  11.5 - 14.5 % Final   • RDW-SD 05/10/2017 42.0  35.1 - 43.9 fl Final   • MPV 05/10/2017 8.7  8.0 - 12.0 fL Final   • Platelets 05/10/2017 182  150 - 450 10*3/mm3 Final   • Neutrophil % 05/10/2017 69.1  37.0 - 80.0 % Final   • Lymphocyte % 05/10/2017 19.0  10.0 - 50.0 % Final   • Monocyte % 05/10/2017 6.8  0.0 - 12.0 % Final   • Eosinophil % 05/10/2017 3.4  0.0 - 7.0 % Final   • Basophil % 05/10/2017 0.1  0.0 - 2.0 % Final   • Immature Grans % 05/10/2017 1.6* 0.0 - 0.5 % Final   • Neutrophils, Absolute 05/10/2017 4.81  2.00 - 8.60 10*3/mm3 Final   • Lymphocytes, Absolute 05/10/2017 1.32  0.60 - 4.20 10*3/mm3 Final   • Monocytes, Absolute 05/10/2017 0.47  0.00 - 0.90 10*3/mm3 Final   • Eosinophils, Absolute 05/10/2017 0.24  0.00 - 0.70 10*3/mm3 Final   • Basophils, Absolute 05/10/2017 0.01  0.00 - 0.20 10*3/mm3 Final   • Immature Grans, Absolute 05/10/2017 0.11* 0.00 - 0.02 10*3/mm3 Final   • Glucose 05/10/2017 173* 70 - 130 mg/dL Final    Meter: [id number]: [id number] STEPHON LAINEZ   • RBC, UA 05/10/2017 0-2* None Seen /HPF Final   • WBC, UA 05/10/2017 0-2  None Seen, 0-2, 3-5 /HPF Final   • Bacteria, UA 05/10/2017 None Seen  None Seen /HPF Final   • Squamous Epithelial Cells, UA 05/10/2017 None Seen  None Seen, 0-2 /HPF Final   • Hyaline Casts, UA 05/10/2017 0-2  None Seen /LPF Final   • Methodology 05/10/2017 Automated Microscopy   Final   Admission on 05/03/2017, Discharged on 05/04/2017   Component Date Value Ref Range Status   • Glucose 05/03/2017 121* 60 - 100 mg/dL Final   • BUN 05/03/2017 19  7 - 21 mg/dL Final   • Creatinine 05/03/2017 0.84  0.70 - 1.30 mg/dL Final   • Sodium 05/03/2017 137  137 - 145 mmol/L Final   • Potassium 05/03/2017 4.1  3.5 - 5.1 mmol/L Final   • Chloride 05/03/2017 101  95 - 110 mmol/L Final   • CO2 05/03/2017 27.0  22.0 - 31.0 mmol/L Final   • Calcium 05/03/2017 8.5  8.4 - 10.2 mg/dL Final    • Total Protein 05/03/2017 7.0  6.3 - 8.6 g/dL Final   • Albumin 05/03/2017 3.50  3.40 - 4.80 g/dL Final   • ALT (SGPT) 05/03/2017 25  21 - 72 U/L Final   • AST (SGOT) 05/03/2017 23  17 - 59 U/L Final   • Alkaline Phosphatase 05/03/2017 65  38 - 126 U/L Final   • Total Bilirubin 05/03/2017 0.3  0.2 - 1.3 mg/dL Final   • eGFR Non African Amer 05/03/2017 102  >60 mL/min/1.73 Final   • Globulin 05/03/2017 3.5  2.3 - 3.5 gm/dL Final   • A/G Ratio 05/03/2017 1.0* 1.1 - 1.8 g/dL Final   • BUN/Creatinine Ratio 05/03/2017 22.6  7.0 - 25.0 Final   • Anion Gap 05/03/2017 9.0  5.0 - 15.0 mmol/L Final   • Protime 05/03/2017 12.8  11.1 - 15.3 Seconds Final   • INR 05/03/2017 0.97  0.80 - 1.20 Final   • PTT 05/03/2017 32.6  20.0 - 40.3 seconds Final   • Troponin I 05/03/2017 <0.012  <=0.034 ng/mL Final   • Troponin I 05/03/2017 <0.012  <=0.034 ng/mL Final   • Troponin I 05/03/2017 <0.012  <=0.034 ng/mL Final   • Creatine Kinase 05/03/2017 172* 55 - 170 U/L Final   • CKMB 05/03/2017 1.21  0.00 - 5.00 ng/mL Final   • WBC 05/03/2017 7.53  3.20 - 9.80 10*3/mm3 Final   • RBC 05/03/2017 4.42  4.37 - 5.74 10*6/mm3 Final   • Hemoglobin 05/03/2017 12.2* 13.7 - 17.3 g/dL Final   • Hematocrit 05/03/2017 36.0* 39.0 - 49.0 % Final   • MCV 05/03/2017 81.4  80.0 - 98.0 fL Final   • MCH 05/03/2017 27.6  26.5 - 34.0 pg Final   • MCHC 05/03/2017 33.9  31.5 - 36.3 g/dL Final   • RDW 05/03/2017 14.4  11.5 - 14.5 % Final   • RDW-SD 05/03/2017 42.7  35.1 - 43.9 fl Final   • MPV 05/03/2017 8.8  8.0 - 12.0 fL Final   • Platelets 05/03/2017 210  150 - 450 10*3/mm3 Final   • Neutrophil % 05/03/2017 67.3  37.0 - 80.0 % Final   • Lymphocyte % 05/03/2017 19.0  10.0 - 50.0 % Final   • Monocyte % 05/03/2017 8.0  0.0 - 12.0 % Final   • Eosinophil % 05/03/2017 3.2  0.0 - 7.0 % Final   • Basophil % 05/03/2017 0.1  0.0 - 2.0 % Final   • Immature Grans % 05/03/2017 2.4* 0.0 - 0.5 % Final   • Neutrophils, Absolute 05/03/2017 5.07  2.00 - 8.60 10*3/mm3 Final   •  Lymphocytes, Absolute 05/03/2017 1.43  0.60 - 4.20 10*3/mm3 Final   • Monocytes, Absolute 05/03/2017 0.60  0.00 - 0.90 10*3/mm3 Final   • Eosinophils, Absolute 05/03/2017 0.24  0.00 - 0.70 10*3/mm3 Final   • Basophils, Absolute 05/03/2017 0.01  0.00 - 0.20 10*3/mm3 Final   • Immature Grans, Absolute 05/03/2017 0.18* 0.00 - 0.02 10*3/mm3 Final   • proBNP 05/03/2017 120.0  0.0 - 450.0 pg/mL Final   • D-Dimer, Quantitative 05/03/2017 306  0 - 470 ng/mL (FEU) Final   • Site 05/03/2017    Final    Not performed at this site.   • Cory's Test 05/03/2017    Final    Not performed at this site.   • pH, Arterial 05/03/2017 7.401  7.350 - 7.450 pH units Final   • pCO2, Arterial 05/03/2017 43.9  35.0 - 45.0 mm Hg Final   • pO2, Arterial 05/03/2017 103.4  80.0 - 105.0 mm Hg Final   • HCO3, Arterial 05/03/2017 26.6* 22.0 - 26.0 mmol/L Final   • Base Excess, Arterial 05/03/2017 1.5  -2.4 - 2.4 mmol/L Final   • O2 Saturation, Arterial 05/03/2017 97.6  94.0 - 100.0 % Final   • Hemoglobin, Blood Gas 05/03/2017 12.6* 14 - 18 g/dL Final   • Hematocrit, Blood Gas 05/03/2017 37.0  40.0 - 54.0 % Final   • CO2 Content 05/03/2017 28.0* 23 - 27 Final   • Sodium, Arterial 05/03/2017 136.4* 138 - 146 mmol/L Final   • Potassium, Arterial 05/03/2017 3.97  3.6 - 4.9 mmol/L Final   • Glucose, Arterial 05/03/2017 125  mmol/L Final   • Barometric Pressure for Blood Gas 05/03/2017   mmHg Final    Not performed at this site.   • Modality 05/03/2017    Final    Not performed at this site.   • Ionized Calcium 05/03/2017 4.7  4.5 - 4.9 mg/dL Final   • Extra Tube 05/03/2017 hold for add-on   Final    Auto resulted   • Glucose 05/03/2017 145* 70 - 130 mg/dL Final    RN NotifiedMeter: [id number]: [id number] ELBA PETERSEN   • Glucose 05/03/2017 215* 70 - 130 mg/dL Final    RN NotifiedMeter: [id number]: [id number] ELBA PETERSEN   • Glucose 05/03/2017 174* 70 - 130 mg/dL Final    RN NotifiedMeter: [id number]: [id number] JUVE  LULI   • Glucose 05/04/2017 144* 70 - 130 mg/dL Final    RN NotifiedMeter: [id number]: [id number] JUVE KRAFTT   • Glucose 05/04/2017 207* 70 - 130 mg/dL Final    RN NotifiedMeter: [id number]: [id number] CAROLYNE GARNER   Admission on 04/30/2017, Discharged on 05/01/2017   Component Date Value Ref Range Status   • Glucose 04/30/2017 176* 60 - 100 mg/dL Final   • BUN 04/30/2017 9  7 - 21 mg/dL Final   • Creatinine 04/30/2017 0.79  0.70 - 1.30 mg/dL Final   • Sodium 04/30/2017 136* 137 - 145 mmol/L Final   • Potassium 04/30/2017 4.0  3.5 - 5.1 mmol/L Final   • Chloride 04/30/2017 97  95 - 110 mmol/L Final   • CO2 04/30/2017 27.0  22.0 - 31.0 mmol/L Final   • Calcium 04/30/2017 8.8  8.4 - 10.2 mg/dL Final   • Total Protein 04/30/2017 7.7  6.3 - 8.6 g/dL Final   • Albumin 04/30/2017 3.90  3.40 - 4.80 g/dL Final   • ALT (SGPT) 04/30/2017 33  21 - 72 U/L Final   • AST (SGOT) 04/30/2017 33  17 - 59 U/L Final   • Alkaline Phosphatase 04/30/2017 74  38 - 126 U/L Final   • Total Bilirubin 04/30/2017 0.6  0.2 - 1.3 mg/dL Final   • eGFR Non African Amer 04/30/2017 110  70 - 162 mL/min/1.73 Final   • Globulin 04/30/2017 3.8* 2.3 - 3.5 gm/dL Final   • A/G Ratio 04/30/2017 1.0* 1.1 - 1.8 g/dL Final   • BUN/Creatinine Ratio 04/30/2017 11.4  7.0 - 25.0 Final   • Anion Gap 04/30/2017 12.0  5.0 - 15.0 mmol/L Final   • Creatine Kinase 04/30/2017 213* 55 - 170 U/L Final   • CKMB 04/30/2017 2.00  0.00 - 5.00 ng/mL Final   • Troponin I 04/30/2017 <0.012  <=0.034 ng/mL Final   • PTT 04/30/2017 43.9* 20.0 - 40.3 seconds Final   • Protime 04/30/2017 18.1* 11.1 - 15.3 Seconds Final   • INR 04/30/2017 1.50* 0.80 - 1.20 Final   • WBC 04/30/2017 7.86  3.20 - 9.80 10*3/mm3 Final   • RBC 04/30/2017 4.59  4.37 - 5.74 10*6/mm3 Final   • Hemoglobin 04/30/2017 12.9* 13.7 - 17.3 g/dL Final   • Hematocrit 04/30/2017 37.3* 39.0 - 49.0 % Final   • MCV 04/30/2017 81.3  80.0 - 98.0 fL Final   • MCH 04/30/2017 28.1  26.5 - 34.0 pg  Final   • MCHC 04/30/2017 34.6  31.5 - 36.3 g/dL Final   • RDW 04/30/2017 14.6* 11.5 - 14.5 % Final   • RDW-SD 04/30/2017 43.4  35.1 - 43.9 fl Final   • MPV 04/30/2017 8.8  8.0 - 12.0 fL Final   • Platelets 04/30/2017 190  150 - 450 10*3/mm3 Final   • Neutrophil % 04/30/2017 71.7  37.0 - 80.0 % Final   • Lymphocyte % 04/30/2017 15.5  10.0 - 50.0 % Final   • Monocyte % 04/30/2017 7.3  0.0 - 12.0 % Final   • Eosinophil % 04/30/2017 3.3  0.0 - 7.0 % Final   • Basophil % 04/30/2017 0.3  0.0 - 2.0 % Final   • Immature Grans % 04/30/2017 1.9* 0.0 - 0.5 % Final   • Neutrophils, Absolute 04/30/2017 5.64  2.00 - 8.60 10*3/mm3 Final   • Lymphocytes, Absolute 04/30/2017 1.22  0.60 - 4.20 10*3/mm3 Final   • Monocytes, Absolute 04/30/2017 0.57  0.00 - 0.90 10*3/mm3 Final   • Eosinophils, Absolute 04/30/2017 0.26  0.00 - 0.70 10*3/mm3 Final   • Basophils, Absolute 04/30/2017 0.02  0.00 - 0.20 10*3/mm3 Final   • Immature Grans, Absolute 04/30/2017 0.15* 0.00 - 0.02 10*3/mm3 Final   • Site 04/30/2017    Final    Not performed at this site.   • Cory's Test 04/30/2017    Final    Not performed at this site.   • pH, Arterial 04/30/2017 7.387  7.350 - 7.450 pH units Final   • pCO2, Arterial 04/30/2017 43.5  35.0 - 45.0 mm Hg Final   • pO2, Arterial 04/30/2017 83.5  80.0 - 105.0 mm Hg Final   • HCO3, Arterial 04/30/2017 25.6  22.0 - 26.0 mmol/L Final   • Base Excess, Arterial 04/30/2017 0.3  -2.4 - 2.4 mmol/L Final   • O2 Saturation, Arterial 04/30/2017 95.9  % Final   • Hemoglobin, Blood Gas 04/30/2017 13.5* 14 - 18 g/dL Final   • Hematocrit, Blood Gas 04/30/2017 40.0  40.0 - 54.0 % Final   • CO2 Content 04/30/2017 26.9  23 - 27 Final   • Sodium, Arterial 04/30/2017 135.5* 138 - 146 mmol/L Final   • Potassium, Arterial 04/30/2017 4.02  3.6 - 4.9 mmol/L Final   • Glucose, Arterial 04/30/2017 147  mmol/L Final   • Barometric Pressure for Blood Gas 04/30/2017   mmHg Final    Not performed at this site.   • Modality 04/30/2017    Final     Not performed at this site.   • Ionized Calcium 04/30/2017 4.7  4.5 - 4.9 mg/dL Final   • Extra Tube 04/30/2017 Hold for add-ons.   Final    Auto resulted.   • Troponin I 04/30/2017 <0.012  <=0.034 ng/mL Final   • Troponin I 04/30/2017 <0.012  <=0.034 ng/mL Final   • Glucose 04/30/2017 187* 70 - 130 mg/dL Final    Sliding Scale AdminMeter: [id number]: [id number] LISA KRUGER   • Glucose 04/30/2017 155* 70 - 130 mg/dL Final    RN NotifiedMeter: [id number]: [id number] MOHAN OLIVERA   • Troponin I 05/01/2017 <0.012  <=0.034 ng/mL Final   • Glucose 05/01/2017 195* 70 - 130 mg/dL Final    RN NotifiedMeter: [id number]: [id number] MOHAN OLIVERA   • WBC 05/01/2017 8.30  3.20 - 9.80 10*3/mm3 Final   • RBC 05/01/2017 4.25* 4.37 - 5.74 10*6/mm3 Final   • Hemoglobin 05/01/2017 11.8* 13.7 - 17.3 g/dL Final   • Hematocrit 05/01/2017 34.5* 39.0 - 49.0 % Final   • MCV 05/01/2017 81.2  80.0 - 98.0 fL Final   • MCH 05/01/2017 27.8  26.5 - 34.0 pg Final   • MCHC 05/01/2017 34.2  31.5 - 36.3 g/dL Final   • RDW 05/01/2017 14.7* 11.5 - 14.5 % Final   • RDW-SD 05/01/2017 43.5  35.1 - 43.9 fl Final   • MPV 05/01/2017 8.6  8.0 - 12.0 fL Final   • Platelets 05/01/2017 192  150 - 450 10*3/mm3 Final   • Glucose 05/01/2017 183* 60 - 100 mg/dL Final   • BUN 05/01/2017 13  7 - 21 mg/dL Final   • Creatinine 05/01/2017 1.03  0.70 - 1.30 mg/dL Final   • Sodium 05/01/2017 131* 137 - 145 mmol/L Final   • Potassium 05/01/2017 4.1  3.5 - 5.1 mmol/L Final   • Chloride 05/01/2017 94* 95 - 110 mmol/L Final   • CO2 05/01/2017 28.0  22.0 - 31.0 mmol/L Final   • Calcium 05/01/2017 8.4  8.4 - 10.2 mg/dL Final   • eGFR Non African Amer 05/01/2017 81  >60 mL/min/1.73 Final   • BUN/Creatinine Ratio 05/01/2017 12.6  7.0 - 25.0 Final   • Anion Gap 05/01/2017 9.0  5.0 - 15.0 mmol/L Final   • Glucose 05/01/2017 152* 70 - 130 mg/dL Final    RN NotifiedMeter: [id number]: [id number] ZAY CH     Labs have been independently  reviewed    Key Imaging/Tracings/POC Testing  EKG:  Indication: chest pain  Comparison: 7/24/2014- NSR, normal ECG    Vent rate: 81 bmp  Fox: 144 ms  QRS: 114 ms  Qt/QTc: 380/441 ms  Interpretation:  NSR- no change from comparison    Assessment and Medications  Problems Addressed this Visit        Nervous and Auditory    Chest pain - Primary          Plan/Additional Notes/Instructions  Plan   1. Strongly advised patient to be transported to ER for further evaluation  2. Patient refuses EMS transport  3. Patient verbalizes he will drive himself to the local ER for further evaluation  4. Patient verbalizes promise to go directly to the ER after stopping at his home to  his wife. He states he will go to [facility] for PE workup. He states he will request to be transferred to [facility] or [facility]. Bea Brown LPN present   5. Report called to [facility] ER physician, Dr. Brannon       Follow-Up  Return for after ER/hospital dishcarge.    Patient/caregiver verbalizes understanding of all orders and instructions in this plan of care.       This document has been electronically signed by LALA Barajas on July 31, 2017 1:44 PM

## 2017-08-10 ENCOUNTER — OFFICE VISIT (OUTPATIENT)
Dept: OPHTHALMOLOGY | Facility: CLINIC | Age: 37
End: 2017-08-10

## 2017-08-10 DIAGNOSIS — H53.001 AMBLYOPIA, RIGHT: ICD-10-CM

## 2017-08-10 DIAGNOSIS — E11.9 DIABETES MELLITUS WITHOUT COMPLICATION (HCC): Primary | ICD-10-CM

## 2017-08-10 DIAGNOSIS — H50.10 EXOTROPIA: ICD-10-CM

## 2017-08-10 PROCEDURE — 92004 COMPRE OPH EXAM NEW PT 1/>: CPT | Performed by: OPHTHALMOLOGY

## 2017-08-10 NOTE — PROGRESS NOTES
Subjective   Yordy Hansen is a 36 y.o. male.   Chief Complaint   Patient presents with   • Diabetic Eye Exam     HPI     Diabetic Eye Exam   Associated symptoms: Negative for blurred vision   Diabetes Type: Type 2 and taking oral medications   Duration: years   Blood Sugars: is controlled           Comments   Hx of RXT, amblyopia       Last edited by Cory Cadena MD on 8/10/2017  8:43 AM. (History)          Review of Systems    Objective   Base Eye Exam     Visual Acuity (Snellen - Linear)      Right Left   Dist cc 20/400 20/20   Near cc J16 J1         Tonometry (Applanation, 8:43 AM)      Right Left   Pressure 16 17         Pupils      Pupils   Right PERRL   Left PERRL         Visual Fields      Left Right   Result Full Full         Extraocular Movement      Right Left   Result Abnormal Full    -- -- --   --  -1   -- -- --    -- -- --   --  --   -- -- --            Dilation     Both eyes:  2.5% Johnathan Synephrine, 1.0% Mydriacyl @ 8:47 AM            Strabismus Exam        Method:  Alternate cover Distance Near Near +3.00DS Near Bifocals      RXT 70                         - - - - - -    - - - - - -    R Tilt                           - -  -1    - -  - -                        L Tilt       - - - - - -    - - - - - -            DVD:      DVD:             Slit Lamp and Fundus Exam     External Exam      Right Left    External Normal Normal      Slit Lamp Exam      Right Left    Lids/Lashes Normal Normal    Conjunctiva/Sclera White and quiet White and quiet    Cornea Clear Clear    Anterior Chamber Deep and quiet Deep and quiet    Iris Round and reactive Round and reactive    Lens Clear Clear    Vitreous Normal Normal      Fundus Exam      Right Left    Disc Normal Normal    Macula Normal Normal    Vessels Normal Normal    Periphery Normal Normal            Refraction     Wearing Rx      Sphere Cylinder Axis   Right -0.50 +0.25 107   Left -0.25 +0.75 053         Manifest Refraction      Sphere   Right ni    Left                    Assessment/Plan   Diagnoses and all orders for this visit:    Diabetes mellitus without complication    Amblyopia, right    Exotropia      Plan:     Eye disease risks with diabetes discussed  Recommend ophthalmology f/u one year/prn

## 2017-09-04 ENCOUNTER — HOSPITAL ENCOUNTER (OUTPATIENT)
Facility: HOSPITAL | Age: 37
Setting detail: OBSERVATION
Discharge: HOME | End: 2017-09-05
Attending: EMERGENCY MEDICINE | Admitting: INTERNAL MEDICINE

## 2017-09-04 ENCOUNTER — APPOINTMENT (OUTPATIENT)
Dept: GENERAL RADIOLOGY | Facility: HOSPITAL | Age: 37
End: 2017-09-04

## 2017-09-04 DIAGNOSIS — R73.9 HYPERGLYCEMIA: ICD-10-CM

## 2017-09-04 DIAGNOSIS — E66.01 MORBID OBESITY DUE TO EXCESS CALORIES (HCC): ICD-10-CM

## 2017-09-04 DIAGNOSIS — R07.9 CHEST PAIN, UNSPECIFIED TYPE: Primary | ICD-10-CM

## 2017-09-04 DIAGNOSIS — I87.303 STASIS EDEMA OF BOTH LOWER EXTREMITIES: ICD-10-CM

## 2017-09-04 DIAGNOSIS — R06.02 SHORTNESS OF BREATH: ICD-10-CM

## 2017-09-04 DIAGNOSIS — R09.02 HYPOXIA: ICD-10-CM

## 2017-09-04 DIAGNOSIS — J44.1 CHRONIC OBSTRUCTIVE PULMONARY DISEASE WITH ACUTE EXACERBATION (HCC): ICD-10-CM

## 2017-09-04 DIAGNOSIS — R06.89 HYPERCAPNIA: ICD-10-CM

## 2017-09-04 LAB
ALBUMIN SERPL-MCNC: 3.6 G/DL (ref 3.4–4.8)
ALBUMIN/GLOB SERPL: 1.1 G/DL (ref 1.1–1.8)
ALP SERPL-CCNC: 70 U/L (ref 38–126)
ALT SERPL W P-5'-P-CCNC: 32 U/L (ref 21–72)
ANION GAP SERPL CALCULATED.3IONS-SCNC: 10 MMOL/L (ref 5–15)
ARTERIAL PATENCY WRIST A: ABNORMAL
AST SERPL-CCNC: 29 U/L (ref 17–59)
ATMOSPHERIC PRESS: ABNORMAL MMHG
BASE EXCESS BLDA CALC-SCNC: 2.3 MMOL/L (ref -2.4–2.4)
BASOPHILS # BLD AUTO: 0.01 10*3/MM3 (ref 0–0.2)
BASOPHILS NFR BLD AUTO: 0.1 % (ref 0–2)
BDY SITE: ABNORMAL
BILIRUB SERPL-MCNC: 0.4 MG/DL (ref 0.2–1.3)
BUN BLD-MCNC: 13 MG/DL (ref 7–21)
BUN/CREAT SERPL: 17.8 (ref 7–25)
CA-I BLD-MCNC: 4.8 MG/DL (ref 4.5–4.9)
CALCIUM SPEC-SCNC: 8.5 MG/DL (ref 8.4–10.2)
CHLORIDE SERPL-SCNC: 99 MMOL/L (ref 95–110)
CK MB SERPL-CCNC: 2.02 NG/ML (ref 0–5)
CK SERPL-CCNC: 135 U/L (ref 55–170)
CO2 BLDA-SCNC: 29.8 MMOL/L (ref 23–27)
CO2 SERPL-SCNC: 28 MMOL/L (ref 22–31)
CREAT BLD-MCNC: 0.73 MG/DL (ref 0.7–1.3)
DEPRECATED RDW RBC AUTO: 44.8 FL (ref 35.1–43.9)
EOSINOPHIL # BLD AUTO: 0.13 10*3/MM3 (ref 0–0.7)
EOSINOPHIL NFR BLD AUTO: 1.1 % (ref 0–7)
ERYTHROCYTE [DISTWIDTH] IN BLOOD BY AUTOMATED COUNT: 14.9 % (ref 11.5–14.5)
GFR SERPL CREATININE-BSD FRML MDRD: 120 ML/MIN/1.73 (ref 60–162)
GLOBULIN UR ELPH-MCNC: 3.3 GM/DL (ref 2.3–3.5)
GLUCOSE BLD-MCNC: 302 MG/DL (ref 60–100)
GLUCOSE BLDA-MCNC: 417 MMOL/L
GLUCOSE BLDC GLUCOMTR-MCNC: 396 MG/DL (ref 70–130)
HCO3 BLDA-SCNC: 28.2 MMOL/L (ref 22–26)
HCT VFR BLD AUTO: 38.1 % (ref 39–49)
HCT VFR BLD CALC: 39 % (ref 40–54)
HGB BLD-MCNC: 13 G/DL (ref 13.7–17.3)
HGB BLDA-MCNC: 13.2 G/DL (ref 14–18)
HOLD SPECIMEN: NORMAL
HOLD SPECIMEN: NORMAL
IMM GRANULOCYTES # BLD: 0.15 10*3/MM3 (ref 0–0.02)
IMM GRANULOCYTES NFR BLD: 1.2 % (ref 0–0.5)
INR PPP: 0.95 (ref 0.8–1.2)
LIPASE SERPL-CCNC: 90 U/L (ref 23–300)
LYMPHOCYTES # BLD AUTO: 1.65 10*3/MM3 (ref 0.6–4.2)
LYMPHOCYTES NFR BLD AUTO: 13.3 % (ref 10–50)
MAGNESIUM SERPL-MCNC: 1.8 MG/DL (ref 1.6–2.3)
MCH RBC QN AUTO: 27.8 PG (ref 26.5–34)
MCHC RBC AUTO-ENTMCNC: 34.1 G/DL (ref 31.5–36.3)
MCV RBC AUTO: 81.6 FL (ref 80–98)
MODALITY: ABNORMAL
MONOCYTES # BLD AUTO: 0.99 10*3/MM3 (ref 0–0.9)
MONOCYTES NFR BLD AUTO: 8 % (ref 0–12)
NEUTROPHILS # BLD AUTO: 9.44 10*3/MM3 (ref 2–8.6)
NEUTROPHILS NFR BLD AUTO: 76.3 % (ref 37–80)
NT-PROBNP SERPL-MCNC: 256 PG/ML (ref 0–450)
PCO2 BLDA: 49.3 MM HG (ref 35–45)
PH BLDA: 7.38 PH UNITS (ref 7.35–7.45)
PLATELET # BLD AUTO: 229 10*3/MM3 (ref 150–450)
PMV BLD AUTO: 8.7 FL (ref 8–12)
PO2 BLDA: 70.8 MM HG (ref 80–105)
POTASSIUM BLD-SCNC: 3.6 MMOL/L (ref 3.5–5.1)
POTASSIUM BLDA-SCNC: 3.59 MMOL/L (ref 3.6–4.9)
PROT SERPL-MCNC: 6.9 G/DL (ref 6.3–8.6)
PROTHROMBIN TIME: 12.6 SECONDS (ref 11.1–15.3)
RBC # BLD AUTO: 4.67 10*6/MM3 (ref 4.37–5.74)
SAO2 % BLDCOA: 93.3 % (ref 94–100)
SODIUM BLD-SCNC: 137 MMOL/L (ref 137–145)
SODIUM BLDA-SCNC: 136.7 MMOL/L (ref 138–146)
TROPONIN I SERPL-MCNC: 0.01 NG/ML
TSH SERPL DL<=0.05 MIU/L-ACNC: 0.8 MIU/ML (ref 0.46–4.68)
WBC NRBC COR # BLD: 12.37 10*3/MM3 (ref 3.2–9.8)
WHOLE BLOOD HOLD SPECIMEN: NORMAL
WHOLE BLOOD HOLD SPECIMEN: NORMAL

## 2017-09-04 PROCEDURE — 84443 ASSAY THYROID STIM HORMONE: CPT | Performed by: EMERGENCY MEDICINE

## 2017-09-04 PROCEDURE — 99285 EMERGENCY DEPT VISIT HI MDM: CPT

## 2017-09-04 PROCEDURE — 85610 PROTHROMBIN TIME: CPT | Performed by: EMERGENCY MEDICINE

## 2017-09-04 PROCEDURE — 85025 COMPLETE CBC W/AUTO DIFF WBC: CPT | Performed by: EMERGENCY MEDICINE

## 2017-09-04 PROCEDURE — 82803 BLOOD GASES ANY COMBINATION: CPT | Performed by: EMERGENCY MEDICINE

## 2017-09-04 PROCEDURE — 80053 COMPREHEN METABOLIC PANEL: CPT | Performed by: EMERGENCY MEDICINE

## 2017-09-04 PROCEDURE — 85379 FIBRIN DEGRADATION QUANT: CPT | Performed by: EMERGENCY MEDICINE

## 2017-09-04 PROCEDURE — 83690 ASSAY OF LIPASE: CPT | Performed by: EMERGENCY MEDICINE

## 2017-09-04 PROCEDURE — 83880 ASSAY OF NATRIURETIC PEPTIDE: CPT | Performed by: EMERGENCY MEDICINE

## 2017-09-04 PROCEDURE — 82550 ASSAY OF CK (CPK): CPT | Performed by: EMERGENCY MEDICINE

## 2017-09-04 PROCEDURE — 83735 ASSAY OF MAGNESIUM: CPT | Performed by: EMERGENCY MEDICINE

## 2017-09-04 PROCEDURE — 71020 HC CHEST PA AND LATERAL: CPT

## 2017-09-04 PROCEDURE — 93005 ELECTROCARDIOGRAM TRACING: CPT

## 2017-09-04 PROCEDURE — 63710000001 INSULIN REGULAR HUMAN PER 5 UNITS: Performed by: EMERGENCY MEDICINE

## 2017-09-04 PROCEDURE — 82962 GLUCOSE BLOOD TEST: CPT

## 2017-09-04 PROCEDURE — 84484 ASSAY OF TROPONIN QUANT: CPT | Performed by: EMERGENCY MEDICINE

## 2017-09-04 PROCEDURE — 82553 CREATINE MB FRACTION: CPT | Performed by: EMERGENCY MEDICINE

## 2017-09-04 PROCEDURE — 93010 ELECTROCARDIOGRAM REPORT: CPT | Performed by: INTERNAL MEDICINE

## 2017-09-04 RX ORDER — NITROGLYCERIN 0.4 MG/1
0.4 TABLET SUBLINGUAL
Status: COMPLETED | OUTPATIENT
Start: 2017-09-04 | End: 2017-09-04

## 2017-09-04 RX ORDER — SODIUM CHLORIDE 0.9 % (FLUSH) 0.9 %
10 SYRINGE (ML) INJECTION AS NEEDED
Status: DISCONTINUED | OUTPATIENT
Start: 2017-09-04 | End: 2017-09-05 | Stop reason: HOSPADM

## 2017-09-04 RX ORDER — ASPIRIN 325 MG
325 TABLET ORAL ONCE
Status: COMPLETED | OUTPATIENT
Start: 2017-09-04 | End: 2017-09-04

## 2017-09-04 RX ORDER — ALBUTEROL SULFATE 2.5 MG/3ML
2.5 SOLUTION RESPIRATORY (INHALATION)
Status: COMPLETED | OUTPATIENT
Start: 2017-09-04 | End: 2017-09-04

## 2017-09-04 RX ADMIN — Medication 10 ML: at 21:42

## 2017-09-04 RX ADMIN — HUMAN INSULIN 10 UNITS: 100 INJECTION, SOLUTION SUBCUTANEOUS at 23:21

## 2017-09-04 RX ADMIN — HUMAN INSULIN 6 UNITS: 100 INJECTION, SOLUTION SUBCUTANEOUS at 23:48

## 2017-09-04 RX ADMIN — NITROGLYCERIN 0.4 MG: 0.4 TABLET SUBLINGUAL at 23:21

## 2017-09-04 RX ADMIN — ALBUTEROL SULFATE 2.5 MG: 2.5 SOLUTION RESPIRATORY (INHALATION) at 23:21

## 2017-09-04 RX ADMIN — ASPIRIN 325 MG: 325 TABLET, COATED ORAL at 22:30

## 2017-09-04 RX ADMIN — ALBUTEROL SULFATE 2.5 MG: 2.5 SOLUTION RESPIRATORY (INHALATION) at 22:41

## 2017-09-04 RX ADMIN — NITROGLYCERIN 0.4 MG: 0.4 TABLET SUBLINGUAL at 22:45

## 2017-09-04 RX ADMIN — NITROGLYCERIN 0.4 MG: 0.4 TABLET SUBLINGUAL at 22:30

## 2017-09-04 RX ADMIN — NITROGLYCERIN 1 INCH: 20 OINTMENT TOPICAL at 22:30

## 2017-09-05 VITALS
DIASTOLIC BLOOD PRESSURE: 62 MMHG | WEIGHT: 315 LBS | HEIGHT: 71 IN | SYSTOLIC BLOOD PRESSURE: 142 MMHG | HEART RATE: 84 BPM | RESPIRATION RATE: 20 BRPM | TEMPERATURE: 97.5 F | BODY MASS INDEX: 44.1 KG/M2 | OXYGEN SATURATION: 97 %

## 2017-09-05 LAB
ALBUMIN SERPL-MCNC: 3.5 G/DL (ref 3.4–4.8)
ALBUMIN/GLOB SERPL: 1.1 G/DL (ref 1.1–1.8)
ALP SERPL-CCNC: 58 U/L (ref 38–126)
ALT SERPL W P-5'-P-CCNC: 34 U/L (ref 21–72)
ANION GAP SERPL CALCULATED.3IONS-SCNC: 9 MMOL/L (ref 5–15)
AST SERPL-CCNC: 16 U/L (ref 17–59)
BILIRUB SERPL-MCNC: 0.4 MG/DL (ref 0.2–1.3)
BUN BLD-MCNC: 11 MG/DL (ref 7–21)
BUN/CREAT SERPL: 15.5 (ref 7–25)
CALCIUM SPEC-SCNC: 8.2 MG/DL (ref 8.4–10.2)
CHLORIDE SERPL-SCNC: 101 MMOL/L (ref 95–110)
CK MB SERPL-CCNC: 1.63 NG/ML (ref 0–5)
CK MB SERPL-CCNC: 1.79 NG/ML (ref 0–5)
CK MB SERPL-CCNC: 1.8 NG/ML (ref 0–5)
CK SERPL-CCNC: 108 U/L (ref 55–170)
CK SERPL-CCNC: 112 U/L (ref 55–170)
CK SERPL-CCNC: 115 U/L (ref 55–170)
CO2 SERPL-SCNC: 29 MMOL/L (ref 22–31)
CREAT BLD-MCNC: 0.71 MG/DL (ref 0.7–1.3)
D-DIMER, QUANTITATIVE (MAD,POW, STR): <270 NG/ML (FEU) (ref 0–470)
DEPRECATED RDW RBC AUTO: 44.5 FL (ref 35.1–43.9)
EOSINOPHIL # BLD MANUAL: 0.1 10*3/MM3 (ref 0–0.7)
EOSINOPHIL NFR BLD MANUAL: 1 % (ref 0–7)
ERYTHROCYTE [DISTWIDTH] IN BLOOD BY AUTOMATED COUNT: 14.8 % (ref 11.5–14.5)
GFR SERPL CREATININE-BSD FRML MDRD: 124 ML/MIN/1.73 (ref 70–162)
GLOBULIN UR ELPH-MCNC: 3.2 GM/DL (ref 2.3–3.5)
GLUCOSE BLD-MCNC: 202 MG/DL (ref 60–100)
GLUCOSE BLDC GLUCOMTR-MCNC: 190 MG/DL (ref 70–130)
GLUCOSE BLDC GLUCOMTR-MCNC: 256 MG/DL (ref 70–130)
GLUCOSE BLDC GLUCOMTR-MCNC: 306 MG/DL (ref 70–130)
GLUCOSE BLDC GLUCOMTR-MCNC: 308 MG/DL (ref 70–130)
GLUCOSE BLDC GLUCOMTR-MCNC: 351 MG/DL (ref 70–130)
HCT VFR BLD AUTO: 37 % (ref 39–49)
HGB BLD-MCNC: 12.2 G/DL (ref 13.7–17.3)
LYMPHOCYTES # BLD MANUAL: 1.73 10*3/MM3 (ref 0.6–4.2)
LYMPHOCYTES NFR BLD MANUAL: 18 % (ref 10–50)
LYMPHOCYTES NFR BLD MANUAL: 3 % (ref 0–12)
MCH RBC QN AUTO: 27.4 PG (ref 26.5–34)
MCHC RBC AUTO-ENTMCNC: 33 G/DL (ref 31.5–36.3)
MCV RBC AUTO: 83 FL (ref 80–98)
MONOCYTES # BLD AUTO: 0.29 10*3/MM3 (ref 0–0.9)
NEUTROPHILS # BLD AUTO: 7.48 10*3/MM3 (ref 2–8.6)
NEUTROPHILS NFR BLD MANUAL: 78 % (ref 37–80)
PLATELET # BLD AUTO: 181 10*3/MM3 (ref 150–450)
PMV BLD AUTO: 9 FL (ref 8–12)
POTASSIUM BLD-SCNC: 3.4 MMOL/L (ref 3.5–5.1)
PROT SERPL-MCNC: 6.7 G/DL (ref 6.3–8.6)
RBC # BLD AUTO: 4.46 10*6/MM3 (ref 4.37–5.74)
RBC MORPH BLD: NORMAL
SMALL PLATELETS BLD QL SMEAR: ADEQUATE
SODIUM BLD-SCNC: 139 MMOL/L (ref 137–145)
TROPONIN I SERPL-MCNC: <0.012 NG/ML
WBC MORPH BLD: NORMAL
WBC NRBC COR # BLD: 9.59 10*3/MM3 (ref 3.2–9.8)

## 2017-09-05 PROCEDURE — 94760 N-INVAS EAR/PLS OXIMETRY 1: CPT

## 2017-09-05 PROCEDURE — 82962 GLUCOSE BLOOD TEST: CPT

## 2017-09-05 PROCEDURE — 94799 UNLISTED PULMONARY SVC/PX: CPT

## 2017-09-05 PROCEDURE — 84484 ASSAY OF TROPONIN QUANT: CPT | Performed by: EMERGENCY MEDICINE

## 2017-09-05 PROCEDURE — 82553 CREATINE MB FRACTION: CPT | Performed by: EMERGENCY MEDICINE

## 2017-09-05 PROCEDURE — 85007 BL SMEAR W/DIFF WBC COUNT: CPT | Performed by: INTERNAL MEDICINE

## 2017-09-05 PROCEDURE — 94660 CPAP INITIATION&MGMT: CPT

## 2017-09-05 PROCEDURE — 94640 AIRWAY INHALATION TREATMENT: CPT

## 2017-09-05 PROCEDURE — 96365 THER/PROPH/DIAG IV INF INIT: CPT

## 2017-09-05 PROCEDURE — 85027 COMPLETE CBC AUTOMATED: CPT | Performed by: INTERNAL MEDICINE

## 2017-09-05 PROCEDURE — G0378 HOSPITAL OBSERVATION PER HR: HCPCS

## 2017-09-05 PROCEDURE — 25010000002 CEFTRIAXONE: Performed by: INTERNAL MEDICINE

## 2017-09-05 PROCEDURE — 80053 COMPREHEN METABOLIC PANEL: CPT | Performed by: INTERNAL MEDICINE

## 2017-09-05 PROCEDURE — 25010000002 METHYLPREDNISOLONE PER 125 MG: Performed by: INTERNAL MEDICINE

## 2017-09-05 PROCEDURE — 82550 ASSAY OF CK (CPK): CPT | Performed by: EMERGENCY MEDICINE

## 2017-09-05 PROCEDURE — 63710000001 INSULIN REGULAR HUMAN PER 5 UNITS: Performed by: EMERGENCY MEDICINE

## 2017-09-05 PROCEDURE — 63710000001 INSULIN ASPART PER 5 UNITS: Performed by: INTERNAL MEDICINE

## 2017-09-05 PROCEDURE — 96375 TX/PRO/DX INJ NEW DRUG ADDON: CPT

## 2017-09-05 RX ORDER — ONDANSETRON 4 MG/1
4 TABLET, ORALLY DISINTEGRATING ORAL EVERY 6 HOURS PRN
Status: DISCONTINUED | OUTPATIENT
Start: 2017-09-05 | End: 2017-09-05 | Stop reason: HOSPADM

## 2017-09-05 RX ORDER — PANTOPRAZOLE SODIUM 40 MG/1
40 TABLET, DELAYED RELEASE ORAL NIGHTLY
Status: DISCONTINUED | OUTPATIENT
Start: 2017-09-05 | End: 2017-09-05 | Stop reason: HOSPADM

## 2017-09-05 RX ORDER — TIZANIDINE 2 MG/1
2 TABLET ORAL EVERY 8 HOURS PRN
Status: DISCONTINUED | OUTPATIENT
Start: 2017-09-05 | End: 2017-09-05 | Stop reason: HOSPADM

## 2017-09-05 RX ORDER — METHYLPREDNISOLONE SODIUM SUCCINATE 125 MG/2ML
80 INJECTION, POWDER, LYOPHILIZED, FOR SOLUTION INTRAMUSCULAR; INTRAVENOUS EVERY 8 HOURS
Status: DISCONTINUED | OUTPATIENT
Start: 2017-09-05 | End: 2017-09-05 | Stop reason: HOSPADM

## 2017-09-05 RX ORDER — METOPROLOL SUCCINATE 100 MG/1
100 TABLET, EXTENDED RELEASE ORAL NIGHTLY
Status: DISCONTINUED | OUTPATIENT
Start: 2017-09-05 | End: 2017-09-05 | Stop reason: HOSPADM

## 2017-09-05 RX ORDER — SODIUM CHLORIDE 0.9 % (FLUSH) 0.9 %
1-10 SYRINGE (ML) INJECTION AS NEEDED
Status: DISCONTINUED | OUTPATIENT
Start: 2017-09-05 | End: 2017-09-05 | Stop reason: HOSPADM

## 2017-09-05 RX ORDER — DEXTROSE MONOHYDRATE 25 G/50ML
25 INJECTION, SOLUTION INTRAVENOUS
Status: DISCONTINUED | OUTPATIENT
Start: 2017-09-05 | End: 2017-09-05 | Stop reason: HOSPADM

## 2017-09-05 RX ORDER — BISACODYL 5 MG/1
5 TABLET, DELAYED RELEASE ORAL DAILY PRN
Status: DISCONTINUED | OUTPATIENT
Start: 2017-09-05 | End: 2017-09-05 | Stop reason: HOSPADM

## 2017-09-05 RX ORDER — ATORVASTATIN CALCIUM 20 MG/1
20 TABLET, FILM COATED ORAL NIGHTLY
Status: DISCONTINUED | OUTPATIENT
Start: 2017-09-05 | End: 2017-09-05 | Stop reason: HOSPADM

## 2017-09-05 RX ORDER — CLOPIDOGREL BISULFATE 75 MG/1
75 TABLET ORAL DAILY
Status: DISCONTINUED | OUTPATIENT
Start: 2017-09-05 | End: 2017-09-05 | Stop reason: HOSPADM

## 2017-09-05 RX ORDER — PRAMIPEXOLE DIHYDROCHLORIDE 1 MG/1
2 TABLET ORAL NIGHTLY
Status: DISCONTINUED | OUTPATIENT
Start: 2017-09-05 | End: 2017-09-05 | Stop reason: HOSPADM

## 2017-09-05 RX ORDER — RANOLAZINE 500 MG/1
1000 TABLET, EXTENDED RELEASE ORAL EVERY 12 HOURS SCHEDULED
Status: DISCONTINUED | OUTPATIENT
Start: 2017-09-05 | End: 2017-09-05 | Stop reason: HOSPADM

## 2017-09-05 RX ORDER — POTASSIUM CHLORIDE 1.5 G/1.77G
40 POWDER, FOR SOLUTION ORAL AS NEEDED
Status: DISCONTINUED | OUTPATIENT
Start: 2017-09-05 | End: 2017-09-05 | Stop reason: HOSPADM

## 2017-09-05 RX ORDER — NITROGLYCERIN 0.4 MG/1
0.4 TABLET SUBLINGUAL
Status: DISCONTINUED | OUTPATIENT
Start: 2017-09-05 | End: 2017-09-05 | Stop reason: HOSPADM

## 2017-09-05 RX ORDER — IPRATROPIUM BROMIDE AND ALBUTEROL SULFATE 2.5; .5 MG/3ML; MG/3ML
3 SOLUTION RESPIRATORY (INHALATION)
Status: DISCONTINUED | OUTPATIENT
Start: 2017-09-05 | End: 2017-09-05 | Stop reason: HOSPADM

## 2017-09-05 RX ORDER — ISOSORBIDE MONONITRATE 60 MG/1
120 TABLET, EXTENDED RELEASE ORAL EVERY MORNING
Status: DISCONTINUED | OUTPATIENT
Start: 2017-09-05 | End: 2017-09-05 | Stop reason: HOSPADM

## 2017-09-05 RX ORDER — METHYLPREDNISOLONE 4 MG/1
TABLET ORAL
Qty: 21 EACH | Refills: 0 | Status: SHIPPED | OUTPATIENT
Start: 2017-09-05 | End: 2017-09-11 | Stop reason: SDUPTHER

## 2017-09-05 RX ORDER — AMLODIPINE BESYLATE 10 MG/1
10 TABLET ORAL NIGHTLY
Status: DISCONTINUED | OUTPATIENT
Start: 2017-09-05 | End: 2017-09-05 | Stop reason: HOSPADM

## 2017-09-05 RX ORDER — SERTRALINE HYDROCHLORIDE 25 MG/1
25 TABLET, FILM COATED ORAL DAILY
Status: DISCONTINUED | OUTPATIENT
Start: 2017-09-05 | End: 2017-09-05 | Stop reason: HOSPADM

## 2017-09-05 RX ORDER — ACETAMINOPHEN 325 MG/1
650 TABLET ORAL EVERY 4 HOURS PRN
Status: DISCONTINUED | OUTPATIENT
Start: 2017-09-05 | End: 2017-09-05 | Stop reason: HOSPADM

## 2017-09-05 RX ORDER — LISINOPRIL 40 MG/1
40 TABLET ORAL EVERY MORNING
Status: DISCONTINUED | OUTPATIENT
Start: 2017-09-05 | End: 2017-09-05 | Stop reason: HOSPADM

## 2017-09-05 RX ORDER — NICOTINE POLACRILEX 4 MG
15 LOZENGE BUCCAL
Status: DISCONTINUED | OUTPATIENT
Start: 2017-09-05 | End: 2017-09-05 | Stop reason: HOSPADM

## 2017-09-05 RX ORDER — POTASSIUM CHLORIDE 750 MG/1
40 CAPSULE, EXTENDED RELEASE ORAL AS NEEDED
Status: DISCONTINUED | OUTPATIENT
Start: 2017-09-05 | End: 2017-09-05 | Stop reason: HOSPADM

## 2017-09-05 RX ORDER — TRAZODONE HYDROCHLORIDE 150 MG/1
300 TABLET ORAL NIGHTLY
Status: DISCONTINUED | OUTPATIENT
Start: 2017-09-05 | End: 2017-09-05 | Stop reason: HOSPADM

## 2017-09-05 RX ADMIN — RANOLAZINE 1000 MG: 500 TABLET, FILM COATED, EXTENDED RELEASE ORAL at 08:37

## 2017-09-05 RX ADMIN — METHYLPREDNISOLONE SODIUM SUCCINATE 80 MG: 125 INJECTION, POWDER, FOR SOLUTION INTRAMUSCULAR; INTRAVENOUS at 04:40

## 2017-09-05 RX ADMIN — IPRATROPIUM BROMIDE AND ALBUTEROL SULFATE 3 ML: 2.5; .5 SOLUTION RESPIRATORY (INHALATION) at 06:19

## 2017-09-05 RX ADMIN — RIVAROXABAN 20 MG: 10 TABLET, FILM COATED ORAL at 08:38

## 2017-09-05 RX ADMIN — ISOSORBIDE MONONITRATE 120 MG: 60 TABLET, EXTENDED RELEASE ORAL at 08:36

## 2017-09-05 RX ADMIN — SERTRALINE HYDROCHLORIDE 25 MG: 25 TABLET ORAL at 08:37

## 2017-09-05 RX ADMIN — INSULIN ASPART 12 UNITS: 100 INJECTION, SOLUTION INTRAVENOUS; SUBCUTANEOUS at 10:34

## 2017-09-05 RX ADMIN — IPRATROPIUM BROMIDE AND ALBUTEROL SULFATE 3 ML: 2.5; .5 SOLUTION RESPIRATORY (INHALATION) at 12:58

## 2017-09-05 RX ADMIN — CEFTRIAXONE 2 G: 2 INJECTION, POWDER, FOR SOLUTION INTRAMUSCULAR; INTRAVENOUS at 04:40

## 2017-09-05 RX ADMIN — CLOPIDOGREL BISULFATE 75 MG: 75 TABLET ORAL at 08:37

## 2017-09-05 RX ADMIN — HUMAN INSULIN 10 UNITS: 100 INJECTION, SOLUTION SUBCUTANEOUS at 02:17

## 2017-09-05 RX ADMIN — INSULIN ASPART 3 UNITS: 100 INJECTION, SOLUTION INTRAVENOUS; SUBCUTANEOUS at 07:47

## 2017-09-05 RX ADMIN — LISINOPRIL 40 MG: 40 TABLET ORAL at 08:38

## 2017-09-05 RX ADMIN — METFORMIN HYDROCHLORIDE 1000 MG: 500 TABLET ORAL at 08:36

## 2017-09-05 NOTE — DISCHARGE SUMMARY
[facility] Medicine Services  DISCHARGE SUMMARY       Date of Admission: 9/4/2017  Date of Discharge:  9/5/2017  Primary Care Physician: LALA Barajas    Presenting Problem/History of Present Illness:  Shortness of breath [R06.02]  Hypercapnia [R06.89]  Hyperglycemia [R73.9]  Hypoxia [R09.02]  Chronic obstructive pulmonary disease with acute exacerbation [J44.1]  Stasis edema of both lower extremities [I87.303]  Morbid obesity due to excess calories [E66.01]  Chest pain, unspecified type [R07.9]       Final Discharge Diagnoses:  Hospital Problem List     Chest pain          Consults:   Consults     Date and Time Order Name Status Description    9/5/2017 0252 Hospitalist (on-call MD unless specified)            Pertinent Test Results:   Lab Results (last 24 hours)     Procedure Component Value Units Date/Time    CBC & Differential [[id number]] Collected:  09/04/17 2104    Specimen:  Blood Updated:  09/04/17 2111    Narrative:       The following orders were created for panel order CBC & Differential.  Procedure                               Abnormality         Status                     ---------                               -----------         ------                     CBC Auto Differential[[id number]]        Abnormal            Final result                 Please view results for these tests on the individual orders.    CBC Auto Differential [[id number]]  (Abnormal) Collected:  09/04/17 2104    Specimen:  Blood Updated:  09/04/17 2111     WBC 12.37 (H) 10*3/mm3      RBC 4.67 10*6/mm3      Hemoglobin 13.0 (L) g/dL      Hematocrit 38.1 (L) %      MCV 81.6 fL      MCH 27.8 pg      MCHC 34.1 g/dL      RDW 14.9 (H) %      RDW-SD 44.8 (H) fl      MPV 8.7 fL      Platelets 229 10*3/mm3      Neutrophil % 76.3 %      Lymphocyte % 13.3 %      Monocyte % 8.0 %      Eosinophil % 1.1 %      Basophil % 0.1 %      Immature Grans % 1.2 (H) %      Neutrophils, Absolute 9.44 (H)  10*3/mm3      Lymphocytes, Absolute 1.65 10*3/mm3      Monocytes, Absolute 0.99 (H) 10*3/mm3      Eosinophils, Absolute 0.13 10*3/mm3      Basophils, Absolute 0.01 10*3/mm3      Immature Grans, Absolute 0.15 (H) 10*3/mm3     Comprehensive Metabolic Panel [[id number]]  (Abnormal) Collected:  09/04/17 2104    Specimen:  Blood Updated:  09/04/17 2121     Glucose 302 (H) mg/dL      BUN 13 mg/dL      Creatinine 0.73 mg/dL      Sodium 137 mmol/L      Potassium 3.6 mmol/L      Chloride 99 mmol/L      CO2 28.0 mmol/L      Calcium 8.5 mg/dL      Total Protein 6.9 g/dL      Albumin 3.60 g/dL      ALT (SGPT) 32 U/L      AST (SGOT) 29 U/L      Alkaline Phosphatase 70 U/L      Total Bilirubin 0.4 mg/dL      eGFR Non African Amer 120 mL/min/1.73      Globulin 3.3 gm/dL      A/G Ratio 1.1 g/dL      BUN/Creatinine Ratio 17.8     Anion Gap 10.0 mmol/L     CK [[id number]]  (Normal) Collected:  09/04/17 2104    Specimen:  Blood Updated:  09/04/17 2121     Creatine Kinase 135 U/L     Troponin [[id number]]  (Normal) Collected:  09/04/17 2104    Specimen:  Blood Updated:  09/04/17 2133     Troponin I 0.012 ng/mL     BNP [[id number]]  (Normal) Collected:  09/04/17 2104    Specimen:  Blood Updated:  09/04/17 2133     proBNP 256.0 pg/mL     CK-MB [[id number]]  (Normal) Collected:  09/04/17 2104    Specimen:  Blood Updated:  09/04/17 2133     CKMB 2.02 ng/mL     Protime-INR [[id number]]  (Normal) Collected:  09/04/17 2104    Specimen:  Blood Updated:  09/04/17 2224     Protime 12.6 Seconds      INR 0.95    Narrative:       Therapeutic range for most indications is 2.0-3.0 INR,  or 2.5-3.5 for mechanical heart valves.    Lipase [[id number]]  (Normal) Collected:  09/04/17 2104    Specimen:  Blood Updated:  09/04/17 2225     Lipase 90 U/L     Magnesium [[id number]]  (Normal) Collected:  09/04/17 2104    Specimen:  Blood Updated:  09/04/17 2225     Magnesium 1.8 mg/dL     TSH [[id number]]  (Normal) Collected:  09/04/17 2104    Specimen:  Blood Updated:   09/04/17 2256     TSH 0.800 mIU/mL     Blood Gas, Arterial [[id number]]  (Abnormal) Collected:  09/04/17 2241    Specimen:  Arterial Blood Updated:  09/04/17 2259     Site --      Not performed at this site.        Cory's Test --      Not performed at this site.        pH, Arterial 7.376 pH units      pCO2, Arterial 49.3 (H) mm Hg      pO2, Arterial 70.8 (L) mm Hg      HCO3, Arterial 28.2 (H) mmol/L      Base Excess, Arterial 2.3 mmol/L      O2 Saturation, Arterial 93.3 (L) %      Hemoglobin, Blood Gas 13.2 (L) g/dL      Hematocrit, Blood Gas 39.0 %      CO2 Content 29.8 (H)     Sodium, Arterial 136.7 (L) mmol/L      Potassium, Arterial 3.59 (L) mmol/L      Glucose, Arterial 417 mmol/L      Barometric Pressure for Blood Gas -- mmHg       Not performed at this site.        Modality --      Not performed at this site.        Ionized Calcium 4.80 mg/dL     Light Blue Top [[id number]] Collected:  09/04/17 2104    Specimen:  Blood Updated:  09/04/17 2302     Extra Tube hold for add-on      Auto resulted       Green Top (Gel) [[id number]] Collected:  09/04/17 2104    Specimen:  Blood Updated:  09/04/17 2302     Extra Tube Hold for add-ons.      Auto resulted.       Gold Top - SST [[id number]] Collected:  09/04/17 2104    Specimen:  Blood Updated:  09/04/17 2302     Extra Tube Hold for add-ons.      Auto resulted.       [place] Draw [[id number]] Collected:  09/04/17 2104    Specimen:  Blood Updated:  09/04/17 2302    Narrative:       The following orders were created for panel order [place] Draw.  Procedure                               Abnormality         Status                     ---------                               -----------         ------                     Light Blue Top[[id number]]                                   Final result               Green Top (Gel)[[id number]]                                  Final result               Lavender Top[[id number]]                                     Final result                Gold Top - [facility][[id number]]                                   Final result                 Please view results for these tests on the individual orders.    Lavender Top [[id number]] Collected:  09/04/17 2104    Specimen:  Blood Updated:  09/04/17 2302     Extra Tube hold for add-on      Auto resulted       POC Glucose Fingerstick [[id number]]  (Abnormal) Collected:  09/04/17 2258    Specimen:  Blood Updated:  09/04/17 2337     Glucose 396 (H) mg/dL       Meter: [id number]: [id number] PASCUAL MARZENA       D-dimer, Quantitative [[id number]]  (Normal) Collected:  09/04/17 2104    Specimen:  Blood Updated:  09/05/17 0010     D-Dimer, Quantitative <270 ng/mL (FEU)     Narrative:       Dimer values <500 ng/ml FEU are FDA approved as aid in diagnosis of deep venous thrombosis and pulmonary embolism.  This test should not be used in an exclusion strategy with pretest probability alone.    A recent guideline regarding diagnosis for pulmonary thomboembolism recommends an adjusted exclusion criterion of age x 10 ng/ml FEU for patients >50 years of age ([employer]oir Intern Med 2015; 163: 701-711).    POC Glucose Fingerstick [[id number]]  (Abnormal) Collected:  09/05/17 0013    Specimen:  Blood Updated:  09/05/17 0026     Glucose 308 (H) mg/dL       Meter: [id number]: [id number] PASCUAL MARZENA       CK [[id number]]  (Normal) Collected:  09/05/17 0014    Specimen:  Blood Updated:  09/05/17 0030     Creatine Kinase 112 U/L     Troponin [[id number]]  (Normal) Collected:  09/05/17 0014    Specimen:  Blood Updated:  09/05/17 0045     Troponin I <0.012 ng/mL     CK-MB [[id number]]  (Normal) Collected:  09/05/17 0014    Specimen:  Blood Updated:  09/05/17 0045     CKMB 1.79 ng/mL     POC Glucose Fingerstick [[id number]]  (Abnormal) Collected:  09/05/17 0153    Specimen:  Blood Updated:  09/05/17 0205     Glucose 306 (H) mg/dL       RN NotifiedMeter: [id number]: [id number] EDUARDA OROPEZA [[id number]]  (Normal)  Collected:  09/05/17 0300    Specimen:  Blood Updated:  09/05/17 0317     Creatine Kinase 115 U/L     CK-MB [[id number]]  (Normal) Collected:  09/05/17 0300    Specimen:  Blood Updated:  09/05/17 0327     CKMB 1.63 ng/mL     POC Glucose Fingerstick [[id number]]  (Abnormal) Collected:  09/05/17 0403    Specimen:  Blood Updated:  09/05/17 0415     Glucose 256 (H) mg/dL       RN NotifiedMeter: [id number]: [id number] NANDO CHAVIRA       CBC Auto Differential [[id number]]  (Abnormal) Collected:  09/05/17 0550    Specimen:  Blood Updated:  09/05/17 0611     WBC 9.59 10*3/mm3      RBC 4.46 10*6/mm3      Hemoglobin 12.2 (L) g/dL      Hematocrit 37.0 (L) %      MCV 83.0 fL      MCH 27.4 pg      MCHC 33.0 g/dL      RDW 14.8 (H) %      RDW-SD 44.5 (H) fl      MPV 9.0 fL      Platelets 181 10*3/mm3     Comprehensive Metabolic Panel [[id number]]  (Abnormal) Collected:  09/05/17 0550    Specimen:  Blood Updated:  09/05/17 0616     Glucose 202 (H) mg/dL      BUN 11 mg/dL      Creatinine 0.71 mg/dL      Sodium 139 mmol/L      Potassium 3.4 (L) mmol/L      Chloride 101 mmol/L      CO2 29.0 mmol/L      Calcium 8.2 (L) mg/dL      Total Protein 6.7 g/dL      Albumin 3.50 g/dL      ALT (SGPT) 34 U/L      AST (SGOT) 16 (L) U/L      Alkaline Phosphatase 58 U/L      Total Bilirubin 0.4 mg/dL      eGFR Non African Amer 124 mL/min/1.73      Globulin 3.2 gm/dL      A/G Ratio 1.1 g/dL      BUN/Creatinine Ratio 15.5     Anion Gap 9.0 mmol/L     CK [[id number]]  (Normal) Collected:  09/05/17 0550    Specimen:  Blood Updated:  09/05/17 0616     Creatine Kinase 108 U/L     CK-MB [[id number]]  (Normal) Collected:  09/05/17 0550    Specimen:  Blood Updated:  09/05/17 0625     CKMB 1.80 ng/mL     POC Glucose Fingerstick [[id number]]  (Abnormal) Collected:  09/05/17 0627    Specimen:  Blood Updated:  09/05/17 0640     Glucose 190 (H) mg/dL       RN NotifiedMeter: [id number]: [id number] NANDO CHAVIRA       CBC & Differential [[id number]]  Collected:  09/05/17 0550    Specimen:  Blood Updated:  09/05/17 0748    Narrative:       The following orders were created for panel order CBC & Differential.  Procedure                               Abnormality         Status                     ---------                               -----------         ------                     Manual Differential[[id number]]                              Final result               CBC Auto Differential[[id number]]        Abnormal            Final result                 Please view results for these tests on the individual orders.    Manual Differential [[id number]] Collected:  09/05/17 0550    Specimen:  Blood Updated:  09/05/17 0748     Neutrophil % 78.0 %      Lymphocyte % 18.0 %      Monocyte % 3.0 %      Eosinophil % 1.0 %      Neutrophils Absolute 7.48 10*3/mm3      Lymphocytes Absolute 1.73 10*3/mm3      Monocytes Absolute 0.29 10*3/mm3      Eosinophils Absolute 0.10 10*3/mm3      RBC Morphology Normal     WBC Morphology Normal     Platelet Estimate Adequate    POC Glucose Fingerstick [[id number]]  (Abnormal) Collected:  09/05/17 1009    Specimen:  Blood Updated:  09/05/17 1043     Glucose 351 (H) mg/dL       RN NotifiedMeter: [id number]: [id number] HUSK KANIKA           Imaging Results (last 24 hours)     Procedure Component Value Units Date/Time    XR Chest 2 View [[id number]] Collected:  09/04/17 2106     Updated:  09/04/17 2130    Narrative:         [facility]    Patient Name: KEARA ESCOBAR    ATTENDING:    REFERRING: EMERGENCY, TRIAGE    ORDERING: EMERGENCY, TRIAGE    -----------------------    PROCEDURE: Chest, AP and lateral    DATE OF EXAM: 9/4/2017    HISTORY: Chest pain and shortness of air    AP and lateral views of the chest were obtained. Study is  compared to 7/16/2017.    The lungs are satisfactorily expanded and clear of infiltrates or  effusions. The heart size is at upper limits normal. The  vasculature does not appear  "congested and costophrenic angles are  clear.       Impression:       No active disease.      Electronically signed by:  Juliocesar Pedraza MD  9/4/2017 9:29 PM CDT  Workstation: [id number]        Chief Complaint on Day of Discharge: None    Hospital Course:    9/5/2017:  The patient states he feels better.  His breathing has improved.  He states he has had breathing problems for several years, but has never had any testing done for it.  He states he has never seen a pulmonologist.  I recommended him to follow up with Dr. Currie and possible have PFTs done has an outpatient.  A medrol dose pack is given.     H&P by Dr. Blackwell: Patient is a 36-year-old male with history of morbid obesity, obstructive sleep apnea, chronic pulmonary embolism, chest pain syndrome, dyslipidemia, GERD, asthma/chronic bronchitis as as emergency department due to onset of shortness of air associated with nonproductive cough and chest discomfort.  Patient denies fever, chills, nausea, vomiting, diaphoresis, palpitation, syncope or presyncope.  In the emergency department he was treated with the bronchodilators with some improvement in his symptoms.  X-ray was unremarkable and d-dimer was undetectable.     Patient has history of repeated hospitalization for chest pain syndrome.  He was discharged from this hospital in late July 2017.    Condition on Discharge:  Stable    Physical Exam on Discharge:  /62 (BP Location: Left arm, Patient Position: Lying)  Pulse 84  Temp 97.5 °F (36.4 °C) (Oral)   Resp 20  Ht 71\" (180.3 cm)  Wt (!) 468 lb (212 kg)  SpO2 97%  BMI 65.27 kg/m2  Physical Exam   Constitutional: He is oriented to person, place, and time.   Morbidly obese   HENT:   Head: Normocephalic and atraumatic.   Eyes: EOM are normal. Pupils are equal, round, and reactive to light.   Neck: Normal range of motion. Neck supple.   Pulmonary/Chest: Effort normal and breath sounds normal.   Abdominal: Soft. Bowel sounds are normal. "   Musculoskeletal: Normal range of motion.   Neurological: He is alert and oriented to person, place, and time.   Skin: Skin is warm and dry.   Psychiatric: He has a normal mood and affect.   Vitals reviewed.    Discharge Disposition:  Home or Self Care    Discharge Medications:   Tyrone Yordy Sean   Home Medication Instructions ROCKY:[id number]    Printed on:09/05/17 6517   Medication Information                      albuterol (PROVENTIL HFA;VENTOLIN HFA) 108 (90 BASE) MCG/ACT inhaler  Inhale 2 puffs as needed for wheezing.             amLODIPine (NORVASC) 10 MG tablet  Take 1 tablet by mouth Every Night.             Canagliflozin (INVOKANA) 300 MG tablet  Take 300 mg by mouth Every Morning Before Breakfast for 90 days.             clopidogrel (PLAVIX) 75 MG tablet  Take 1 tablet by mouth Daily.             HYDROcodone-acetaminophen (NORCO)  MG per tablet  Take  tablets by mouth Every 6 (Six) Hours As Needed.             isosorbide mononitrate (IMDUR) 120 MG 24 hr tablet  Take 1 tablet by mouth Every Morning.             Liraglutide (VICTOZA) 18 MG/3ML solution pen-injector  Inject 1.8 mg under the skin Daily.             lisinopril (PRINIVIL,ZESTRIL) 40 MG tablet  Take 1 tablet by mouth Every Morning.             metFORMIN (GLUCOPHAGE) 1000 MG tablet  Take 1 tablet by mouth 2 (Two) Times a Day With Meals.             MethylPREDNISolone (MEDROL, ETELVINA,) 4 MG tablet  Take as directed on package instructions.             MethylPREDNISolone (MEDROL, ETELVINA,) 4 MG tablet  Take as directed on package instructions.             metoprolol succinate XL (TOPROL-XL) 100 MG 24 hr tablet  Take 1 tablet by mouth Every Night.             MICROLET LANCETS misc  USE TO TEST BLOOD SUGAR EVERY DAY AND AS NEEDED             nitroglycerin (NITROSTAT) 0.4 MG SL tablet  Place 1 tablet under the tongue Every 5 (Five) Minutes As Needed for Chest Pain.             ondansetron ODT (ZOFRAN-ODT) 4 MG disintegrating tablet  Take 1  tablet by mouth Every 6 (Six) Hours As Needed for Nausea or Vomiting.             pantoprazole (PROTONIX) 40 MG EC tablet  Take 1 tablet by mouth Every Night.             pramipexole (MIRAPEX) 1 MG tablet  Take 2 tablets by mouth Every Night. Taking 2mg daily             pravastatin (PRAVACHOL) 80 MG tablet  Take 1 tablet by mouth Every Night.             ranolazine (RANEXA) 1000 MG 12 hr tablet  Take 1 tablet by mouth Every 12 (Twelve) Hours.             rivaroxaban (XARELTO) 20 MG tablet  Take 1 tablet by mouth Daily.             sertraline (ZOLOFT) 25 MG tablet  Take 1 tablet by mouth Daily.             TiZANidine (ZANAFLEX) 2 MG capsule  Take 1 capsule by mouth 3 (Three) Times a Day As Needed for Muscle Spasms.             traZODone (DESYREL) 300 MG tablet  Take 1 tablet by mouth Every Night.                 Discharge Diet:   Diet Instructions     Heart healthy diabetic in hospital                 Activity at Discharge:   Activity Instructions     As tolerated                 Discharge Care Plan/Instructions: Medrol dose pack is given.  The patient will follow up with Dr. Caty Currie with pulmonology for outpatient testing.  Follow up with his PCP in 2-3 days for recheck.      Follow-up Appointments:   Future Appointments  Date Time Provider Department Center   9/7/2017 3:30 PM [facility] None   9/11/2017 10:00 AM  MAD PAT 1  [facility] PAT MAD   10/17/2017 11:00 AM Earnest Perez MD [facility] PC CCTY None   1/4/2018 1:30 PM Edwige Briceno MD [facility] CD POW None         This document has been electronically signed by LALA Carvajal on September 5, 2017 2:13 PM        Time: Greater than 30 minutes.

## 2017-09-05 NOTE — ED PROVIDER NOTES
Subjective   HPI Comments: Shortness of breath  Blood pressure 207/88  Weight 448 pounds    Patient is a 36 y.o. male presenting with shortness of breath.   History provided by:  Patient  Shortness of Breath   Severity:  Moderate  Onset quality:  Gradual  Duration:  3 hours  Timing:  Intermittent  Progression:  Waxing and waning  Context: activity    Relieved by:  Nothing  Worsened by:  Exertion, movement and activity  Ineffective treatments:  None tried  Associated symptoms: chest pain and wheezing    Associated symptoms: no abdominal pain, no cough, no fever, no headaches, no neck pain, no rash and no sore throat    Chest pain:     Quality: aching and sharp      Severity:  Mild    Timing:  Constant    Chronicity:  Recurrent  Risk factors: obesity    Risk factors: no recent alcohol use        Review of Systems   Constitutional: Negative for activity change, appetite change, fatigue and fever.   HENT: Negative for congestion, facial swelling, mouth sores, nosebleeds, sore throat and trouble swallowing.    Eyes: Negative for discharge, redness and itching.   Respiratory: Positive for shortness of breath and wheezing. Negative for apnea and cough.    Cardiovascular: Positive for chest pain. Negative for palpitations.   Gastrointestinal: Negative for abdominal pain, blood in stool and nausea.   Endocrine: Negative for cold intolerance, heat intolerance, polydipsia, polyphagia and polyuria.   Genitourinary: Negative for difficulty urinating, dysuria, flank pain, frequency and hematuria.   Musculoskeletal: Negative for gait problem, joint swelling and neck pain.   Skin: Negative.  Negative for color change, pallor and rash.   Allergic/Immunologic: Negative for environmental allergies.   Neurological: Negative for dizziness, seizures, syncope, speech difficulty, light-headedness, numbness and headaches.   Hematological: Negative for adenopathy.   Psychiatric/Behavioral: Negative for agitation, behavioral problems,  confusion and sleep disturbance. The patient is not nervous/anxious.        Past Medical History:   Diagnosis Date   • Allergic rhinitis    • Asthma    • Chest pain    • Chronic back pain    • Chronic pain    • Coronary artery disease    • Diabetes mellitus    • GERD (gastroesophageal reflux disease)    • Hyperlipidemia    • Hypertension    • Low back pain    • Morbid obesity    • Pulmonary embolism    • RLS (restless legs syndrome)    • Seizures    • Sleep apnea        Allergies   Allergen Reactions   • Glipizide Other (See Comments)     Hallucinations, Slurred Speech  Slurred speech     • Phenergan [Promethazine Hcl]      Burning veins   • Promethazine    • Risperdal [Risperidone]      Slurred speech   • Tramadol      seizures   • Reglan [Metoclopramide] Anxiety       Past Surgical History:   Procedure Laterality Date   • ADENOIDECTOMY     • CARDIAC CATHETERIZATION N/A 3/15/2017    Procedure: Left Heart Cath;  Surgeon: Edwige Brcieno MD;  Location: [facility] CATH INVASIVE LOCATION;  Service:    • CARDIAC CATHETERIZATION N/A 3/15/2017    Procedure: Stent SOPHIA coronary;  Surgeon: Edwige Briceno MD;  Location: [facility] CATH INVASIVE LOCATION;  Service:    • CHOLECYSTECTOMY     • CORONARY ANGIOPLASTY WITH STENT PLACEMENT     • TN RT/LT HEART CATHETERS N/A 3/15/2017    Procedure: Percutaneous Coronary Intervention;  Surgeon: Edwige Briceno MD;  Location: [facility] CATH INVASIVE LOCATION;  Service: Cardiovascular   • TONSILLECTOMY     • TRANSESOPHAGEAL ECHOCARDIOGRAM (MONICA)         Family History   Problem Relation Age of Onset   • Cancer Other    • Coronary artery disease Other    • Diabetes Other    • Heart disease Other    • Hypertension Other        Social History     Social History   • Marital status:      Spouse name: Cori   • Number of children: 0   • Years of education: N/A     Occupational History   • Disabled      Social History Main Topics   • Smoking status: Former Smoker   • Smokeless tobacco: Current User      Types: Snuff   • Alcohol use No   • Drug use: No   • Sexual activity: Defer     Other Topics Concern   • None     Social History Narrative           Objective   Physical Exam   Constitutional: He is oriented to person, place, and time. He appears well-developed and well-nourished.   HENT:   Head: Normocephalic and atraumatic.   Nose: Nose normal.   Mouth/Throat: Oropharynx is clear and moist.   Eyes: Conjunctivae and EOM are normal. Pupils are equal, round, and reactive to light.   Neck: Normal range of motion. Neck supple.   Cardiovascular: Normal rate, regular rhythm, normal heart sounds and intact distal pulses.    Pulmonary/Chest: Effort normal. He has wheezes. He exhibits tenderness.   Abdominal: Soft. Bowel sounds are normal.   Musculoskeletal: Normal range of motion. He exhibits edema.   Neurological: He is alert and oriented to person, place, and time.   Skin: Skin is warm and dry.   Psychiatric: He has a normal mood and affect. His behavior is normal. Judgment and thought content normal.   Nursing note and vitals reviewed.      ECG 12 Lead    Date/Time: 9/5/2017 2:42 AM  Performed by: CHASTITY PARKINSON  Authorized by: CHASTITY PARKINSON   Interpreted by physician  Comparison: not compared with previous ECG   Rhythm: sinus rhythm  Rate: normal  QRS axis: normal  Clinical impression: normal ECG               ED Course  ED Course        Labs Reviewed   COMPREHENSIVE METABOLIC PANEL - Abnormal; Notable for the following:        Result Value    Glucose 302 (*)     All other components within normal limits   CBC WITH AUTO DIFFERENTIAL - Abnormal; Notable for the following:     WBC 12.37 (*)     Hemoglobin 13.0 (*)     Hematocrit 38.1 (*)     RDW 14.9 (*)     RDW-SD 44.8 (*)     Immature Grans % 1.2 (*)     Neutrophils, Absolute 9.44 (*)     Monocytes, Absolute 0.99 (*)     Immature Grans, Absolute 0.15 (*)     All other components within normal limits   BLOOD GAS, ARTERIAL - Abnormal; Notable for the following:      pCO2, Arterial 49.3 (*)     pO2, Arterial 70.8 (*)     HCO3, Arterial 28.2 (*)     O2 Saturation, Arterial 93.3 (*)     Hemoglobin, Blood Gas 13.2 (*)     CO2 Content 29.8 (*)     Sodium, Arterial 136.7 (*)     Potassium, Arterial 3.59 (*)     All other components within normal limits   POCT GLUCOSE FINGERSTICK - Abnormal; Notable for the following:     Glucose 396 (*)     All other components within normal limits   POCT GLUCOSE FINGERSTICK - Abnormal; Notable for the following:     Glucose 308 (*)     All other components within normal limits   POCT GLUCOSE FINGERSTICK - Abnormal; Notable for the following:     Glucose 306 (*)     All other components within normal limits   POCT GLUCOSE FINGERSTICK - Abnormal; Notable for the following:     Glucose 256 (*)     All other components within normal limits   TROPONIN (IN-HOUSE) - Normal   TROPONIN (IN-HOUSE) - Normal   BNP (IN-HOUSE) - Normal   CK - Normal   CK - Normal   CK - Normal   CK MB - Normal   CK MB - Normal   CK MB - Normal   LIPASE - Normal   TSH - Normal   MAGNESIUM - Normal   PROTIME-INR - Normal    Narrative:     Therapeutic range for most indications is 2.0-3.0 INR,  or 2.5-3.5 for mechanical heart valves.   D-DIMER, QUANTITATIVE - Normal    Narrative:     Dimer values <500 ng/ml FEU are FDA approved as aid in diagnosis of deep venous thrombosis and pulmonary embolism.  This test should not be used in an exclusion strategy with pretest probability alone.    A recent guideline regarding diagnosis for pulmonary thomboembolism recommends an adjusted exclusion criterion of age x 10 ng/ml FEU for patients >50 years of age ([employer]ori Intern Med 2015; 163: 701-711).   RAINBOW DRAW    Narrative:     The following orders were created for panel order [place] Draw.  Procedure                               Abnormality         Status                     ---------                               -----------         ------                     Light Blue Top[[id number]]                                    Final result               Green Top (Gel)[[id number]]                                  Final result               Lavender Top[[id number]]                                     Final result               Gold Top - SST[[id number]]                                   Final result                 Please view results for these tests on the individual orders.   CK   CK MB   CK   CK MB   COMPREHENSIVE METABOLIC PANEL   URINALYSIS W/ CULTURE IF INDICATED   CBC WITH AUTO DIFFERENTIAL   POCT GLUCOSE FINGERSTICK   POCT GLUCOSE FINGERSTICK   POCT GLUCOSE FINGERSTICK   POCT GLUCOSE FINGERSTICK   CBC AND DIFFERENTIAL    Narrative:     The following orders were created for panel order CBC & Differential.  Procedure                               Abnormality         Status                     ---------                               -----------         ------                     CBC Auto Differential[[id number]]        Abnormal            Final result                 Please view results for these tests on the individual orders.   LIGHT BLUE TOP   GREEN TOP   LAVENDER TOP   GOLD TOP - SST   CBC AND DIFFERENTIAL    Narrative:     The following orders were created for panel order CBC & Differential.  Procedure                               Abnormality         Status                     ---------                               -----------         ------                     CBC Auto Differential[[id number]]                                                         Please view results for these tests on the individual orders.        XR Chest 2 View   Final Result   No active disease.         Electronically signed by:  Juliocesar Pedraza MD  9/4/2017 9:29 PM CDT   Workstation: [id number]      Patient stated he still feels short of breath  Nurse feels that he is still very labile breathing  He looks like he still trouble struggling with his breath  Discussed with hospitalist for admission            MDM    Final diagnoses:    Chest pain, unspecified type   Shortness of breath   Hypoxia   Stasis edema of both lower extremities   Hypercapnia   Chronic obstructive pulmonary disease with acute exacerbation   Hyperglycemia   Morbid obesity due to excess calories            Deven Mayen MD  09/05/17 [id number]

## 2017-09-05 NOTE — PLAN OF CARE
Problem: Patient Care Overview (Adult)  Goal: Plan of Care Review  Outcome: Ongoing (interventions implemented as appropriate)    09/05/17 0642   Coping/Psychosocial Response Interventions   Plan Of Care Reviewed With patient   Patient Care Overview   Progress no change       Goal: Adult Individualization and Mutuality  Outcome: Ongoing (interventions implemented as appropriate)  Goal: Discharge Needs Assessment  Outcome: Ongoing (interventions implemented as appropriate)    09/05/17 0642   Discharge Needs Assessment   Concerns To Be Addressed denies needs/concerns at this time   Living Environment   Transportation Available car         Problem: Respiratory Insufficiency (Adult)  Goal: Identify Related Risk Factors and Signs and Symptoms    09/05/17 0642   Respiratory Insufficiency   Related Risk Factors (Respiratory Insufficiency) activity intolerance   Signs and Symptoms (Respiratory Insufficiency) dyspnea       Goal: Acid/Base Balance  Outcome: Ongoing (interventions implemented as appropriate)    09/05/17 0642   Respiratory Insufficiency (Adult)   Acid/Base Balance making progress toward outcome       Goal: Effective Ventilation  Outcome: Ongoing (interventions implemented as appropriate)    09/05/17 0642   Respiratory Insufficiency (Adult)   Effective Ventilation making progress toward outcome         Problem: Diabetes, Type 2 (Adult)  Goal: Signs and Symptoms of Listed Potential Problems Will be Absent or Manageable (Diabetes, Type 2)  Outcome: Ongoing (interventions implemented as appropriate)    09/05/17 0642   Diabetes, Type 2   Problems Assessed (Type 2 Diabetes) all   Problems Present (Type 2 Diabetes) impaired glycemic control

## 2017-09-05 NOTE — CONSULTS
"Adult Nutrition  Assessment    Patient Name:  Yordy Hansen  YOB: 1980  MRN: [id number]  Admit Date:  9/4/2017    Assessment Date:  9/5/2017          Reason for Assessment       09/05/17 1030    Reason for Assessment    Reason For Assessment/Visit education    Identified At Risk By Screening Criteria BMI   65                              Comments:  RD consult r/t pt w/BMI 65 which is compatible w/morbid obesity. Pt familiar to RD staff and has been educated during previous admits. Pt reports he has all the information he has received previously and \"tries to do it right\". RD answered several ?s re: carbohydrates and alternatives for sweetened beverages. RD emphasized importance of healthy wt/BMI for managing DM. Will monitor.         Electronically signed by:  Afia Chen RD  09/05/17 10:33 AM  "

## 2017-09-05 NOTE — H&P
[facility] Medicine Services  INPATIENT HISTORY AND PHYSICAL       Patient Care Team:  LALA Barajas as PCP - General (Nurse Practitioner)    Chief complaint   Chief Complaint   Patient presents with   • Chest Pain   • Shortness of Breath       Subjective     Patient is a 36-year-old male with history of morbid obesity, obstructive sleep apnea, chronic pulmonary embolism, chest pain syndrome, dyslipidemia, GERD, asthma/chronic bronchitis as as emergency department due to onset of shortness of air associated with nonproductive cough and chest discomfort.  Patient denies fever, chills, nausea, vomiting, diaphoresis, palpitation, syncope or presyncope.  In the emergency department he was treated with the bronchodilators with some improvement in his symptoms.  X-ray was unremarkable and d-dimer was undetectable.    Patient has history of repeated hospitalization for chest pain syndrome.  He was discharged from this hospital in late July 2017.    Family and social history: Patient is  and lives with his wife.  Denies history of alcohol or tobacco use, ambulates without any assistive devices and able to take care of his activities of daily living.  There is positive family history of diabetes, hypertension and coronary artery disease.    Review of Systems   Review of Systems   Constitutional: Negative for activity change, appetite change, chills, diaphoresis, fatigue and fever.   HENT: Negative for trouble swallowing and voice change.    Eyes: Negative for photophobia and visual disturbance.   Respiratory: Positive for cough, chest tightness and shortness of breath. Negative for choking, wheezing and stridor.    Cardiovascular: Negative for chest pain, palpitations and leg swelling.   Gastrointestinal: Negative for abdominal distention, abdominal pain, blood in stool, constipation, diarrhea, nausea and vomiting.   Endocrine: Negative for cold intolerance, heat  intolerance, polydipsia, polyphagia and polyuria.   Genitourinary: Negative for decreased urine volume, difficulty urinating, dysuria, enuresis, flank pain, frequency, hematuria and urgency.   Musculoskeletal: Negative for arthralgias, gait problem, myalgias, neck pain and neck stiffness.   Skin: Negative for pallor, rash and wound.   Neurological: Negative for dizziness, tremors, seizures, syncope, facial asymmetry, speech difficulty, weakness, light-headedness, numbness and headaches.   Hematological: Does not bruise/bleed easily.   Psychiatric/Behavioral: Negative for agitation, behavioral problems, confusion and sleep disturbance. The patient is not nervous/anxious.        History  Past Medical History:   Diagnosis Date   • Allergic rhinitis    • Asthma    • Chest pain    • Chronic back pain    • Chronic pain    • Coronary artery disease    • Diabetes mellitus    • GERD (gastroesophageal reflux disease)    • Hyperlipidemia    • Hypertension    • Low back pain    • Morbid obesity    • Pulmonary embolism    • RLS (restless legs syndrome)    • Seizures    • Sleep apnea      Past Surgical History:   Procedure Laterality Date   • ADENOIDECTOMY     • CARDIAC CATHETERIZATION N/A 3/15/2017    Procedure: Left Heart Cath;  Surgeon: Edwige Briceno MD;  Location: [facility] INVASIVE LOCATION;  Service:    • CARDIAC CATHETERIZATION N/A 3/15/2017    Procedure: Stent SOPHIA coronary;  Surgeon: Edwige Briceno MD;  Location: [facility] INVASIVE LOCATION;  Service:    • CHOLECYSTECTOMY     • CORONARY ANGIOPLASTY WITH STENT PLACEMENT     • ND RT/LT HEART CATHETERS N/A 3/15/2017    Procedure: Percutaneous Coronary Intervention;  Surgeon: Edwige Briceno MD;  Location: [facility] CATH INVASIVE LOCATION;  Service: Cardiovascular   • TONSILLECTOMY     • TRANSESOPHAGEAL ECHOCARDIOGRAM (MONICA)       Family History   Problem Relation Age of Onset   • Cancer Other    • Coronary artery disease Other    • Diabetes Other    • Heart disease Other    •  Hypertension Other      Social History   Substance Use Topics   • Smoking status: Former Smoker   • Smokeless tobacco: Current User     Types: Snuff   • Alcohol use No       (Not in a hospital admission)  Allergies:  Glipizide; Phenergan [promethazine hcl]; Promethazine; Risperdal [risperidone]; Tramadol; and Reglan [metoclopramide]  Prior to Admission medications    Medication Sig Start Date End Date Taking? Authorizing Provider   albuterol (PROVENTIL HFA;VENTOLIN HFA) 108 (90 BASE) MCG/ACT inhaler Inhale 2 puffs as needed for wheezing.   Yes Historical Provider, MD   amLODIPine (NORVASC) 10 MG tablet Take 1 tablet by mouth Every Night. 6/1/17  Yes LALA Barajas   Canagliflozin (INVOKANA) 300 MG tablet Take 300 mg by mouth Every Morning Before Breakfast for 90 days. 7/17/17 10/15/17 Yes LALA Barajas   clopidogrel (PLAVIX) 75 MG tablet Take 1 tablet by mouth Daily.  Patient taking differently: Take 75 mg by mouth Every Night. 4/25/17  Yes Rehan Griffiths MD   HYDROcodone-acetaminophen (NORCO)  MG per tablet Take  tablets by mouth Every 6 (Six) Hours As Needed. 7/10/17  Yes Historical Provider, MD   isosorbide mononitrate (IMDUR) 120 MG 24 hr tablet Take 1 tablet by mouth Every Morning. 6/1/17  Yes LALA Barajas   Liraglutide (VICTOZA) 18 MG/3ML solution pen-injector Inject 1.8 mg under the skin Daily.   Yes Historical Provider, MD   lisinopril (PRINIVIL,ZESTRIL) 40 MG tablet Take 1 tablet by mouth Every Morning. 6/1/17  Yes LALA Barajas   metFORMIN (GLUCOPHAGE) 1000 MG tablet Take 1 tablet by mouth 2 (Two) Times a Day With Meals. 6/1/17  Yes LALA Barajas   MethylPREDNISolone (MEDROL, ETELVINA,) 4 MG tablet Take as directed on package instructions. 7/4/17  Yes Alphonso Johnson MD   metoprolol succinate XL (TOPROL-XL) 100 MG 24 hr tablet Take 1 tablet by mouth Every Night. 6/1/17  Yes Froylan Wali, APRN   MICROLET LANCETS misc USE TO TEST BLOOD SUGAR EVERY DAY AND AS  NEEDED 6/20/16  Yes Historical Provider, MD   nitroglycerin (NITROSTAT) 0.4 MG SL tablet Place 1 tablet under the tongue Every 5 (Five) Minutes As Needed for Chest Pain. 6/1/17  Yes LALA Barajas   ondansetron ODT (ZOFRAN-ODT) 4 MG disintegrating tablet Take 1 tablet by mouth Every 6 (Six) Hours As Needed for Nausea or Vomiting. 3/24/17  Yes Ifeoma Goel PA-C   pantoprazole (PROTONIX) 40 MG EC tablet Take 1 tablet by mouth Every Night. 6/1/17  Yes LALA Barajas   pramipexole (MIRAPEX) 1 MG tablet Take 2 tablets by mouth Every Night. Taking 2mg daily 6/1/17  Yes LALA Barajas   pravastatin (PRAVACHOL) 80 MG tablet Take 1 tablet by mouth Every Night. 6/1/17  Yes LALA Barajas   ranolazine (RANEXA) 1000 MG 12 hr tablet Take 1 tablet by mouth Every 12 (Twelve) Hours. 6/1/17  Yes LALA Barajas   rivaroxaban (XARELTO) 20 MG tablet Take 1 tablet by mouth Daily.  Patient taking differently: Take 20 mg by mouth Daily With Dinner. 5/30/17  Yes LALA Winters   sertraline (ZOLOFT) 25 MG tablet Take 1 tablet by mouth Daily.  Patient taking differently: Take 25 mg by mouth Every Night. 6/1/17  Yes LALA Braajas   TiZANidine (ZANAFLEX) 2 MG capsule Take 1 capsule by mouth 3 (Three) Times a Day As Needed for Muscle Spasms. 7/17/17  Yes LALA Barajas   traZODone (DESYREL) 300 MG tablet Take 1 tablet by mouth Every Night. 6/1/17  Yes LALA Barajas       Objective     Vital Signs    Temp:  [98.5 °F (36.9 °C)-98.6 °F (37 °C)] 98.6 °F (37 °C)  Heart Rate:  [70-98] 70  Resp:  [20-28] 20  BP: (140-207)/(62-88) 159/66    Physical Exam:      Physical Exam   Constitutional: He is oriented to person, place, and time. He appears well-developed and well-nourished. No distress.   Patient is morbidly obese.   HENT:   Head: Normocephalic and atraumatic.   Eyes: EOM are normal. Pupils are equal, round, and reactive to light. Right eye exhibits no discharge. Left eye exhibits no  discharge. No scleral icterus.   Neck: Normal range of motion. Neck supple. No JVD present. No thyromegaly present.   Cardiovascular: Normal rate, regular rhythm and normal heart sounds.  Exam reveals no gallop and no friction rub.    No murmur heard.  Pulmonary/Chest: Effort normal. He exhibits no tenderness.   There are some minimal inspiratory wheezes bilaterally with diminished breath sounds at the bases.   Abdominal: Soft. Bowel sounds are normal. He exhibits no distension and no mass. There is no tenderness. There is no rebound and no guarding.   Musculoskeletal: He exhibits no edema, tenderness or deformity.   Neurological: He is alert and oriented to person, place, and time. No cranial nerve deficit. He exhibits normal muscle tone. Coordination normal.   Skin: Skin is warm and dry. No rash noted. He is not diaphoretic. No erythema. No pallor.   Psychiatric: He has a normal mood and affect. His behavior is normal. Judgment and thought content normal.   Nursing note and vitals reviewed.      Results Review:       Results from last 7 days  Lab Units 09/04/17  2241 09/04/17 2104   SODIUM mmol/L  --  137   SODIUM, ARTERIAL mmol/L 136.7*  --    POTASSIUM mmol/L  --  3.6   CHLORIDE mmol/L  --  99   CO2 mmol/L  --  28.0   BUN mg/dL  --  13   CREATININE mg/dL  --  0.73   GLUCOSE mg/dL  --  302*   GLUCOSE, ARTERIAL mmol/L 417  --    CALCIUM mg/dL  --  8.5   BILIRUBIN mg/dL  --  0.4   ALK PHOS U/L  --  70   ALT (SGPT) U/L  --  32   AST (SGOT) U/L  --  29         Results from last 7 days  Lab Units 09/04/17  2104   MAGNESIUM mg/dL 1.8         Results from last 7 days  Lab Units 09/04/17 2104   WBC 10*3/mm3 12.37*   HEMOGLOBIN g/dL 13.0*   HEMATOCRIT % 38.1*   PLATELETS 10*3/mm3 229         Results from last 7 days  Lab Units 09/04/17 2104   INR  0.95     Imaging Results (last 7 days)     Procedure Component Value Units Date/Time    XR Chest 2 View [[id number]] Collected:  09/04/17 2106     Updated:  09/04/17 2130     Narrative:         [facility] [place]    Patient Name: KEARA ESCOBAR    ATTENDING:    REFERRING: EMERGENCY, TRIAGE    ORDERING: EMERGENCY, TRIAGE    -----------------------    PROCEDURE: Chest, AP and lateral    DATE OF EXAM: 9/4/2017    HISTORY: Chest pain and shortness of air    AP and lateral views of the chest were obtained. Study is  compared to 7/16/2017.    The lungs are satisfactorily expanded and clear of infiltrates or  effusions. The heart size is at upper limits normal. The  vasculature does not appear congested and costophrenic angles are  clear.       Impression:       No active disease.      Electronically signed by:  Juliocesar Pedraza MD  9/4/2017 9:29 PM CDT  Workstation: [id number]          Assessment/Plan   1.  Possible acute bronchitis/asthma exacerbation: He will be continued on supplemental oxygen, bronchodilators and steroids.  2.  History of diabetes mellitus: Continue anti-glycemic and begin Accu-Cheks and sliding scale insulin.  3.  History of pulmonary embolism: Continue Xarelto.  4.  Continue Protonix for GI prophylaxis.  5.  Further treatment plan or diagnostic studies as hospital course dictates.      I discussed the patients findings and my recommendations with patient.     Melchor Blackwell MD  09/05/17  3:18 AM        Total Time Spent: 50 minutes    EMR Dragon/Transcription disclaimer:   Much of this encounter note is an electronic transcription/translation of spoken language to printed text. The electronic translation of spoken language may permit erroneous, or at times, nonsensical words or phrases to be inadvertently transcribed; Although I have reviewed the note for such errors, some may still exist.

## 2017-09-07 ENCOUNTER — HOSPITAL ENCOUNTER (EMERGENCY)
Facility: HOSPITAL | Age: 37
Discharge: LEFT BEFORE DISCHARGE | End: 2017-09-07

## 2017-09-07 ENCOUNTER — OFFICE VISIT (OUTPATIENT)
Dept: PULMONOLOGY | Facility: CLINIC | Age: 37
End: 2017-09-07

## 2017-09-07 VITALS
RESPIRATION RATE: 23 BRPM | TEMPERATURE: 97.8 F | WEIGHT: 315 LBS | BODY MASS INDEX: 44.1 KG/M2 | HEIGHT: 71 IN | HEART RATE: 80 BPM | SYSTOLIC BLOOD PRESSURE: 150 MMHG | OXYGEN SATURATION: 95 % | DIASTOLIC BLOOD PRESSURE: 71 MMHG

## 2017-09-07 DIAGNOSIS — R06.02 SOB (SHORTNESS OF BREATH): Primary | ICD-10-CM

## 2017-09-07 PROCEDURE — 93005 ELECTROCARDIOGRAM TRACING: CPT

## 2017-09-07 PROCEDURE — 94060 EVALUATION OF WHEEZING: CPT | Performed by: INTERNAL MEDICINE

## 2017-09-07 PROCEDURE — 99211 OFF/OP EST MAY X REQ PHY/QHP: CPT

## 2017-09-11 ENCOUNTER — APPOINTMENT (OUTPATIENT)
Dept: PREADMISSION TESTING | Facility: HOSPITAL | Age: 37
End: 2017-09-11

## 2017-09-11 VITALS
RESPIRATION RATE: 22 BRPM | DIASTOLIC BLOOD PRESSURE: 40 MMHG | HEART RATE: 83 BPM | SYSTOLIC BLOOD PRESSURE: 122 MMHG | HEIGHT: 71 IN | WEIGHT: 315 LBS | BODY MASS INDEX: 44.1 KG/M2 | OXYGEN SATURATION: 98 %

## 2017-09-21 ENCOUNTER — APPOINTMENT (OUTPATIENT)
Dept: GENERAL RADIOLOGY | Facility: HOSPITAL | Age: 37
End: 2017-09-21

## 2017-09-21 ENCOUNTER — HOSPITAL ENCOUNTER (OUTPATIENT)
Facility: HOSPITAL | Age: 37
Setting detail: OBSERVATION
Discharge: HOME | End: 2017-09-22
Attending: EMERGENCY MEDICINE | Admitting: FAMILY MEDICINE

## 2017-09-21 DIAGNOSIS — R07.2 PRECORDIAL PAIN: Primary | ICD-10-CM

## 2017-09-21 PROBLEM — H66.90 OTITIS MEDIA: Status: ACTIVE | Noted: 2017-09-21

## 2017-09-21 LAB
ALBUMIN SERPL-MCNC: 3.4 G/DL (ref 3.4–4.8)
ALBUMIN/GLOB SERPL: 1.3 G/DL (ref 1.1–1.8)
ALP SERPL-CCNC: 58 U/L (ref 38–126)
ALT SERPL W P-5'-P-CCNC: 59 U/L (ref 21–72)
ANION GAP SERPL CALCULATED.3IONS-SCNC: 8 MMOL/L (ref 5–15)
ANISOCYTOSIS BLD QL: NORMAL
AST SERPL-CCNC: 60 U/L (ref 17–59)
BASOPHILS # BLD AUTO: 0.01 10*3/MM3 (ref 0–0.2)
BASOPHILS NFR BLD AUTO: 0.1 % (ref 0–2)
BILIRUB SERPL-MCNC: 1.1 MG/DL (ref 0.2–1.3)
BUN BLD-MCNC: 15 MG/DL (ref 7–21)
BUN/CREAT SERPL: 19.2 (ref 7–25)
CALCIUM SPEC-SCNC: 8.5 MG/DL (ref 8.4–10.2)
CHLORIDE SERPL-SCNC: 96 MMOL/L (ref 95–110)
CO2 SERPL-SCNC: 30 MMOL/L (ref 22–31)
CREAT BLD-MCNC: 0.78 MG/DL (ref 0.7–1.3)
DEPRECATED RDW RBC AUTO: 45.8 FL (ref 35.1–43.9)
EOSINOPHIL # BLD AUTO: 0.2 10*3/MM3 (ref 0–0.7)
EOSINOPHIL NFR BLD AUTO: 2.8 % (ref 0–7)
ERYTHROCYTE [DISTWIDTH] IN BLOOD BY AUTOMATED COUNT: 15 % (ref 11.5–14.5)
GFR SERPL CREATININE-BSD FRML MDRD: 111 ML/MIN/1.73 (ref 70–162)
GLOBULIN UR ELPH-MCNC: 2.7 GM/DL (ref 2.3–3.5)
GLUCOSE BLD-MCNC: 279 MG/DL (ref 60–100)
GLUCOSE BLDC GLUCOMTR-MCNC: 151 MG/DL (ref 70–130)
HCT VFR BLD AUTO: 36.5 % (ref 39–49)
HGB BLD-MCNC: 11.7 G/DL (ref 13.7–17.3)
HOLD SPECIMEN: NORMAL
HOLD SPECIMEN: NORMAL
HYPOCHROMIA BLD QL: NORMAL
IMM GRANULOCYTES # BLD: 0.08 10*3/MM3 (ref 0–0.02)
IMM GRANULOCYTES NFR BLD: 1.1 % (ref 0–0.5)
LYMPHOCYTES # BLD AUTO: 0.9 10*3/MM3 (ref 0.6–4.2)
LYMPHOCYTES NFR BLD AUTO: 12.6 % (ref 10–50)
MCH RBC QN AUTO: 26.6 PG (ref 26.5–34)
MCHC RBC AUTO-ENTMCNC: 32.1 G/DL (ref 31.5–36.3)
MCV RBC AUTO: 83 FL (ref 80–98)
MONOCYTES # BLD AUTO: 0.57 10*3/MM3 (ref 0–0.9)
MONOCYTES NFR BLD AUTO: 8 % (ref 0–12)
NEUTROPHILS # BLD AUTO: 5.38 10*3/MM3 (ref 2–8.6)
NEUTROPHILS NFR BLD AUTO: 75.4 % (ref 37–80)
NT-PROBNP SERPL-MCNC: 39.4 PG/ML (ref 0–450)
PLATELET # BLD AUTO: 141 10*3/MM3 (ref 150–450)
PMV BLD AUTO: 9.2 FL (ref 8–12)
POTASSIUM BLD-SCNC: 3.9 MMOL/L (ref 3.5–5.1)
PROT SERPL-MCNC: 6.1 G/DL (ref 6.3–8.6)
RBC # BLD AUTO: 4.4 10*6/MM3 (ref 4.37–5.74)
SMALL PLATELETS BLD QL SMEAR: NORMAL
SODIUM BLD-SCNC: 134 MMOL/L (ref 137–145)
TROPONIN I SERPL-MCNC: <0.012 NG/ML
TROPONIN I SERPL-MCNC: <0.012 NG/ML
WBC MORPH BLD: NORMAL
WBC NRBC COR # BLD: 7.14 10*3/MM3 (ref 3.2–9.8)
WHOLE BLOOD HOLD SPECIMEN: NORMAL
WHOLE BLOOD HOLD SPECIMEN: NORMAL

## 2017-09-21 PROCEDURE — 84484 ASSAY OF TROPONIN QUANT: CPT | Performed by: EMERGENCY MEDICINE

## 2017-09-21 PROCEDURE — G0378 HOSPITAL OBSERVATION PER HR: HCPCS

## 2017-09-21 PROCEDURE — 25010000002 KETOROLAC TROMETHAMINE PER 15 MG: Performed by: EMERGENCY MEDICINE

## 2017-09-21 PROCEDURE — 63710000001 INSULIN ASPART PER 5 UNITS: Performed by: FAMILY MEDICINE

## 2017-09-21 PROCEDURE — 93005 ELECTROCARDIOGRAM TRACING: CPT

## 2017-09-21 PROCEDURE — 96375 TX/PRO/DX INJ NEW DRUG ADDON: CPT

## 2017-09-21 PROCEDURE — 80053 COMPREHEN METABOLIC PANEL: CPT | Performed by: EMERGENCY MEDICINE

## 2017-09-21 PROCEDURE — 85025 COMPLETE CBC W/AUTO DIFF WBC: CPT | Performed by: EMERGENCY MEDICINE

## 2017-09-21 PROCEDURE — 25010000002 LORAZEPAM PER 2 MG: Performed by: EMERGENCY MEDICINE

## 2017-09-21 PROCEDURE — 96374 THER/PROPH/DIAG INJ IV PUSH: CPT

## 2017-09-21 PROCEDURE — 93010 ELECTROCARDIOGRAM REPORT: CPT | Performed by: INTERNAL MEDICINE

## 2017-09-21 PROCEDURE — 71020 HC CHEST PA AND LATERAL: CPT

## 2017-09-21 PROCEDURE — 96376 TX/PRO/DX INJ SAME DRUG ADON: CPT

## 2017-09-21 PROCEDURE — 25010000002 MORPHINE PER 10 MG: Performed by: EMERGENCY MEDICINE

## 2017-09-21 PROCEDURE — 99285 EMERGENCY DEPT VISIT HI MDM: CPT

## 2017-09-21 PROCEDURE — 83880 ASSAY OF NATRIURETIC PEPTIDE: CPT | Performed by: EMERGENCY MEDICINE

## 2017-09-21 PROCEDURE — 85007 BL SMEAR W/DIFF WBC COUNT: CPT | Performed by: EMERGENCY MEDICINE

## 2017-09-21 PROCEDURE — 82962 GLUCOSE BLOOD TEST: CPT

## 2017-09-21 RX ORDER — ASPIRIN 325 MG
325 TABLET ORAL ONCE
Status: DISCONTINUED | OUTPATIENT
Start: 2017-09-21 | End: 2017-09-21

## 2017-09-21 RX ORDER — PREDNISONE 10 MG/1
10 TABLET ORAL SEE ADMIN INSTRUCTIONS
COMMUNITY
Start: 2017-09-09 | End: 2017-09-28

## 2017-09-21 RX ORDER — METOPROLOL SUCCINATE 100 MG/1
100 TABLET, EXTENDED RELEASE ORAL NIGHTLY
Status: DISCONTINUED | OUTPATIENT
Start: 2017-09-21 | End: 2017-09-22 | Stop reason: HOSPADM

## 2017-09-21 RX ORDER — SODIUM CHLORIDE 0.9 % (FLUSH) 0.9 %
1-10 SYRINGE (ML) INJECTION AS NEEDED
Status: DISCONTINUED | OUTPATIENT
Start: 2017-09-21 | End: 2017-09-22 | Stop reason: HOSPADM

## 2017-09-21 RX ORDER — CLOPIDOGREL BISULFATE 75 MG/1
75 TABLET ORAL NIGHTLY
Status: DISCONTINUED | OUTPATIENT
Start: 2017-09-21 | End: 2017-09-21

## 2017-09-21 RX ORDER — ASPIRIN 325 MG
325 TABLET ORAL ONCE
Status: COMPLETED | OUTPATIENT
Start: 2017-09-21 | End: 2017-09-21

## 2017-09-21 RX ORDER — HYDROCODONE BITARTRATE AND ACETAMINOPHEN 5; 325 MG/1; MG/1
1 TABLET ORAL EVERY 6 HOURS PRN
Status: DISCONTINUED | OUTPATIENT
Start: 2017-09-21 | End: 2017-09-22 | Stop reason: HOSPADM

## 2017-09-21 RX ORDER — TRAZODONE HYDROCHLORIDE 150 MG/1
300 TABLET ORAL NIGHTLY
Status: DISCONTINUED | OUTPATIENT
Start: 2017-09-21 | End: 2017-09-22 | Stop reason: HOSPADM

## 2017-09-21 RX ORDER — PRAMIPEXOLE DIHYDROCHLORIDE 0.5 MG/1
2 TABLET ORAL NIGHTLY
Status: DISCONTINUED | OUTPATIENT
Start: 2017-09-21 | End: 2017-09-22 | Stop reason: HOSPADM

## 2017-09-21 RX ORDER — KETOROLAC TROMETHAMINE 15 MG/ML
15 INJECTION, SOLUTION INTRAMUSCULAR; INTRAVENOUS ONCE
Status: COMPLETED | OUTPATIENT
Start: 2017-09-21 | End: 2017-09-21

## 2017-09-21 RX ORDER — PRAMIPEXOLE DIHYDROCHLORIDE 1 MG/1
2 TABLET ORAL NIGHTLY
Status: DISCONTINUED | OUTPATIENT
Start: 2017-09-21 | End: 2017-09-21 | Stop reason: SDUPTHER

## 2017-09-21 RX ORDER — LORAZEPAM 2 MG/ML
0.5 INJECTION INTRAMUSCULAR ONCE
Status: COMPLETED | OUTPATIENT
Start: 2017-09-21 | End: 2017-09-21

## 2017-09-21 RX ORDER — ATORVASTATIN CALCIUM 20 MG/1
20 TABLET, FILM COATED ORAL DAILY
Status: DISCONTINUED | OUTPATIENT
Start: 2017-09-21 | End: 2017-09-22 | Stop reason: HOSPADM

## 2017-09-21 RX ORDER — LISINOPRIL 40 MG/1
40 TABLET ORAL EVERY MORNING
Status: DISCONTINUED | OUTPATIENT
Start: 2017-09-22 | End: 2017-09-22 | Stop reason: HOSPADM

## 2017-09-21 RX ORDER — ONDANSETRON 4 MG/1
4 TABLET, ORALLY DISINTEGRATING ORAL EVERY 6 HOURS PRN
Status: DISCONTINUED | OUTPATIENT
Start: 2017-09-21 | End: 2017-09-22 | Stop reason: HOSPADM

## 2017-09-21 RX ORDER — LORAZEPAM 2 MG/ML
1 INJECTION INTRAMUSCULAR ONCE
Status: COMPLETED | OUTPATIENT
Start: 2017-09-21 | End: 2017-09-21

## 2017-09-21 RX ORDER — SODIUM CHLORIDE 0.9 % (FLUSH) 0.9 %
10 SYRINGE (ML) INJECTION AS NEEDED
Status: DISCONTINUED | OUTPATIENT
Start: 2017-09-21 | End: 2017-09-22 | Stop reason: HOSPADM

## 2017-09-21 RX ORDER — ISOSORBIDE MONONITRATE 60 MG/1
120 TABLET, EXTENDED RELEASE ORAL EVERY MORNING
Status: DISCONTINUED | OUTPATIENT
Start: 2017-09-22 | End: 2017-09-22 | Stop reason: HOSPADM

## 2017-09-21 RX ORDER — ALBUTEROL SULFATE 2.5 MG/3ML
2.5 SOLUTION RESPIRATORY (INHALATION) EVERY 6 HOURS PRN
Status: DISCONTINUED | OUTPATIENT
Start: 2017-09-21 | End: 2017-09-22 | Stop reason: HOSPADM

## 2017-09-21 RX ORDER — NICOTINE POLACRILEX 4 MG
15 LOZENGE BUCCAL
Status: DISCONTINUED | OUTPATIENT
Start: 2017-09-21 | End: 2017-09-22 | Stop reason: HOSPADM

## 2017-09-21 RX ORDER — MORPHINE SULFATE 4 MG/ML
4 INJECTION, SOLUTION INTRAMUSCULAR; INTRAVENOUS ONCE
Status: COMPLETED | OUTPATIENT
Start: 2017-09-21 | End: 2017-09-21

## 2017-09-21 RX ORDER — DEXTROSE MONOHYDRATE 25 G/50ML
25 INJECTION, SOLUTION INTRAVENOUS
Status: DISCONTINUED | OUTPATIENT
Start: 2017-09-21 | End: 2017-09-22 | Stop reason: HOSPADM

## 2017-09-21 RX ORDER — AMOXICILLIN AND CLAVULANATE POTASSIUM 875; 125 MG/1; MG/1
1 TABLET, FILM COATED ORAL 2 TIMES DAILY
Status: DISCONTINUED | OUTPATIENT
Start: 2017-09-21 | End: 2017-09-22 | Stop reason: HOSPADM

## 2017-09-21 RX ORDER — NEOMYCIN SULFATE, POLYMYXIN B SULFATE AND HYDROCORTISONE 10; 3.5; 1 MG/ML; MG/ML; [USP'U]/ML
SUSPENSION/ DROPS AURICULAR (OTIC)
COMMUNITY
Start: 2017-09-17 | End: 2017-11-10

## 2017-09-21 RX ORDER — AMLODIPINE BESYLATE 10 MG/1
10 TABLET ORAL NIGHTLY
Status: DISCONTINUED | OUTPATIENT
Start: 2017-09-21 | End: 2017-09-22 | Stop reason: HOSPADM

## 2017-09-21 RX ORDER — RANOLAZINE 500 MG/1
1000 TABLET, EXTENDED RELEASE ORAL EVERY 12 HOURS SCHEDULED
Status: DISCONTINUED | OUTPATIENT
Start: 2017-09-21 | End: 2017-09-22 | Stop reason: HOSPADM

## 2017-09-21 RX ORDER — SERTRALINE HYDROCHLORIDE 25 MG/1
25 TABLET, FILM COATED ORAL NIGHTLY
Status: DISCONTINUED | OUTPATIENT
Start: 2017-09-21 | End: 2017-09-22 | Stop reason: HOSPADM

## 2017-09-21 RX ORDER — PANTOPRAZOLE SODIUM 40 MG/1
40 TABLET, DELAYED RELEASE ORAL NIGHTLY
Status: DISCONTINUED | OUTPATIENT
Start: 2017-09-21 | End: 2017-09-22 | Stop reason: HOSPADM

## 2017-09-21 RX ADMIN — PANTOPRAZOLE SODIUM 40 MG: 40 TABLET, DELAYED RELEASE ORAL at 20:42

## 2017-09-21 RX ADMIN — NITROGLYCERIN 1 INCH: 20 OINTMENT TOPICAL at 15:26

## 2017-09-21 RX ADMIN — Medication 10 ML: at 16:16

## 2017-09-21 RX ADMIN — ATORVASTATIN CALCIUM 20 MG: 20 TABLET, FILM COATED ORAL at 20:40

## 2017-09-21 RX ADMIN — RIVAROXABAN 20 MG: 10 TABLET, FILM COATED ORAL at 20:39

## 2017-09-21 RX ADMIN — ASPIRIN 325 MG: 325 TABLET, COATED ORAL at 14:20

## 2017-09-21 RX ADMIN — INSULIN ASPART 2 UNITS: 100 INJECTION, SOLUTION INTRAVENOUS; SUBCUTANEOUS at 22:22

## 2017-09-21 RX ADMIN — RANOLAZINE 1000 MG: 500 TABLET, FILM COATED, EXTENDED RELEASE ORAL at 20:42

## 2017-09-21 RX ADMIN — AMOXICILLIN AND CLAVULANATE POTASSIUM 1 TABLET: 875; 125 TABLET, FILM COATED ORAL at 22:22

## 2017-09-21 RX ADMIN — TRAZODONE HYDROCHLORIDE 300 MG: 150 TABLET ORAL at 20:41

## 2017-09-21 RX ADMIN — METOPROLOL SUCCINATE 100 MG: 100 TABLET, EXTENDED RELEASE ORAL at 20:42

## 2017-09-21 RX ADMIN — PRAMIPEXOLE DIHYDROCHLORIDE 2 MG: 0.5 TABLET ORAL at 20:43

## 2017-09-21 RX ADMIN — AMLODIPINE BESYLATE 10 MG: 10 TABLET ORAL at 20:43

## 2017-09-21 RX ADMIN — LORAZEPAM 1 MG: 2 INJECTION INTRAMUSCULAR; INTRAVENOUS at 18:09

## 2017-09-21 RX ADMIN — MORPHINE SULFATE 4 MG: 4 INJECTION, SOLUTION INTRAMUSCULAR; INTRAVENOUS at 16:15

## 2017-09-21 RX ADMIN — SERTRALINE HYDROCHLORIDE 25 MG: 25 TABLET ORAL at 20:41

## 2017-09-21 RX ADMIN — KETOROLAC TROMETHAMINE 15 MG: 15 INJECTION, SOLUTION INTRAMUSCULAR; INTRAVENOUS at 17:21

## 2017-09-21 RX ADMIN — LORAZEPAM 0.5 MG: 2 INJECTION INTRAMUSCULAR; INTRAVENOUS at 15:22

## 2017-09-21 RX ADMIN — HYDROCODONE BITARTRATE AND ACETAMINOPHEN 1 TABLET: 5; 325 TABLET ORAL at 22:40

## 2017-09-21 NOTE — ED NOTES
Chart not available for admission and awaiting Ativan from pharmacy     Ligia Oliver, GARY  09/21/17 2280

## 2017-09-21 NOTE — H&P
"    HISTORY AND PHYSICAL  NAME: Yordy Hansen  : 1980  MRN: [id number]    DATE OF ADMISSION: 17    DATE & TIME SEEN: 17 6:38 PM    PCP: LALA Barajas    CODE STATUS: Full Code    CHIEF COMPLAINT Chest pain    HPI:  Yordy Hansen is a 37 y.o. male who presents with a 2 hour history of chest pain.  Patient states that it is substernal and is \"stinging\" in nature.  He states that it started when he was doing dishes and was at its maximum 10/10.  It is currently a 2 or 3 out of 10.  Patient has long standing history of chest pain and shortness of breath. He denies any shortness of air, diaphoresis, or vomiting with this episode.  He did however become nauseated when the pain occurred.  Patient last received a heart cath in March of this year and was stented in his LAD.  Patient was also recently admitted and discharged from our service with similar complaints.  Of note patient was diagnosed with acute otitis media and has been on Augmentin.  Risk factors include: Morbid obesity, diabetes, hypertension, hyperlipidemia, and known disease.  Had a sonja discussion with the patient about his weight as it is documented that weight loss and risk modification have been discussed in detail with him.  He states that he thought he has actually been losing weight, but it appears that he is at or above his previous weight.  Patient is currently resting in bed.    CONCURRENT MEDICAL HISTORY:  Past Medical History:   Diagnosis Date   • Allergic rhinitis    • Asthma    • Chest pain    • CHF (congestive heart failure)    • Chronic back pain    • Coronary artery disease     2 stents.  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\"   • GERD (gastroesophageal reflux disease)    • Hyperlipidemia    • Hypertension    • Low back pain    • Morbid obesity    • Pulmonary embolism    • RLS (restless legs syndrome)    • Seizures    • Sleep apnea  "       PAST SURGICAL HISTORY:  Past Surgical History:   Procedure Laterality Date   • ADENOIDECTOMY     • CARDIAC CATHETERIZATION N/A 3/15/2017    Procedure: Left Heart Cath;  Surgeon: Edwige Briceno MD;  Location: [facility] CATH INVASIVE LOCATION;  Service:    • CARDIAC CATHETERIZATION N/A 3/15/2017    Procedure: Stent SOPHIA coronary;  Surgeon: Edwige Briceno MD;  Location: [facility] CATH INVASIVE LOCATION;  Service:    • CHOLECYSTECTOMY     • CORONARY ANGIOPLASTY WITH STENT PLACEMENT     • PA RT/LT HEART CATHETERS N/A 3/15/2017    Procedure: Percutaneous Coronary Intervention;  Surgeon: Edwige Briceno MD;  Location: [facility] CATH INVASIVE LOCATION;  Service: Cardiovascular   • TONSILLECTOMY     • TRANSESOPHAGEAL ECHOCARDIOGRAM (MONICA)         FAMILY HISTORY:  Family History   Problem Relation Age of Onset   • Cancer Other    • Coronary artery disease Other    • Diabetes Other    • Heart disease Other    • Hypertension Other         SOCIAL HISTORY:  Social History     Social History   • Marital status:      Spouse name: Cori   • Number of children: 0   • Years of education: N/A     Occupational History   • Disabled      Social History Main Topics   • Smoking status: Former Smoker     Quit date: 1997   • Smokeless tobacco: Former User     Types: Snuff     Quit date: 2017   • Alcohol use No   • Drug use: No   • Sexual activity: Defer     Other Topics Concern   • Not on file     Social History Narrative       HOME MEDICATIONS:  Prior to Admission medications    Medication Sig Start Date End Date Taking? Authorizing Provider   albuterol (ACCUNEB) 1.25 MG/3ML nebulizer solution Take 1 ampule by nebulization Every 4 (Four) Hours As Needed for Wheezing.   Yes Nurse [facility], RN   albuterol (PROVENTIL HFA;VENTOLIN HFA) 108 (90 BASE) MCG/ACT inhaler Inhale 2 puffs Every 4 (Four) Hours As Needed for Wheezing.   Yes Historical Provider, MD   amLODIPine (NORVASC) 10 MG tablet Take 1 tablet by mouth Every Night. 6/1/17  Yes Froylan  LALA Keyes   amoxicillin-clavulanate (AUGMENTIN) 875-125 MG per tablet Take 1 tablet by mouth 2 (Two) Times a Day. 9/18/17  Yes Alphonso Johnson MD   Canagliflozin (INVOKANA) 300 MG tablet Take 300 mg by mouth Every Morning Before Breakfast for 90 days. 7/17/17 10/15/17 Yes LALA Barajas   guaiFENesin (MUCINEX) 600 MG 12 hr tablet Take 2 tablets by mouth 2 (Two) Times a Day. 9/18/17  Yes Alphonso Johnson MD   isosorbide mononitrate (IMDUR) 120 MG 24 hr tablet Take 1 tablet by mouth Every Morning. 6/1/17  Yes LALA Barajas   lisinopril (PRINIVIL,ZESTRIL) 40 MG tablet Take 1 tablet by mouth Every Morning. 6/1/17  Yes LALA Barajas   metFORMIN (GLUCOPHAGE) 1000 MG tablet Take 1 tablet by mouth 2 (Two) Times a Day With Meals. 6/1/17  Yes LALA Barajas   metoprolol succinate XL (TOPROL-XL) 100 MG 24 hr tablet Take 1 tablet by mouth Every Night. 6/1/17  Yes LALA Barajas   MICROLET LANCETS misc USE TO TEST BLOOD SUGAR EVERY DAY AND AS NEEDED 6/20/16  Yes Historical Provider, MD   ondansetron ODT (ZOFRAN-ODT) 4 MG disintegrating tablet Take 1 tablet by mouth Every 6 (Six) Hours As Needed for Nausea or Vomiting. 3/24/17  Yes Ifeoma Goel PA-C   pantoprazole (PROTONIX) 40 MG EC tablet Take 1 tablet by mouth Every Night. 6/1/17  Yes LALA Barajas   pramipexole (MIRAPEX) 1 MG tablet Take 2 tablets by mouth Every Night. Taking 2mg daily 6/1/17  Yes LALA Barajas   pravastatin (PRAVACHOL) 80 MG tablet Take 1 tablet by mouth Every Night. 6/1/17  Yes LALA Barajas   ranolazine (RANEXA) 1000 MG 12 hr tablet Take 1 tablet by mouth Every 12 (Twelve) Hours. 6/1/17  Yes LALA Barajas   rivaroxaban (XARELTO) 20 MG tablet Take 1 tablet by mouth Daily.  Patient taking differently: Take 20 mg by mouth Daily With Dinner. 5/30/17  Yes LALA Winters   sertraline (ZOLOFT) 25 MG tablet Take 1 tablet by mouth Daily.  Patient taking differently: Take 25 mg by  mouth Every Night. 6/1/17  Yes LALA Barajas   traZODone (DESYREL) 300 MG tablet Take 1 tablet by mouth Every Night. 6/1/17  Yes LALA Barajas   amoxicillin (AMOXIL) 500 MG capsule Take 1 capsule by mouth 3 (Three) Times a Day. 9/13/17   Alphonso Johnson MD   clopidogrel (PLAVIX) 75 MG tablet Take 1 tablet by mouth Daily.  Patient taking differently: Take 75 mg by mouth Every Night. 4/25/17   Rehan Griffiths MD   guaiFENesin (MUCINEX) 600 MG 12 hr tablet Take 1 tablet by mouth 2 (Two) Times a Day. 9/13/17   Alphonso Johnson MD   MethylPREDNISolone (MEDROL, ETELVINA,) 4 MG tablet Take as directed on package instructions. 7/4/17   Alphonso Johnson MD   nitroglycerin (NITROSTAT) 0.4 MG SL tablet Place 1 tablet under the tongue Every 5 (Five) Minutes As Needed for Chest Pain. 6/1/17   LALA Barajas   oxymetazoline (AFRIN) 0.05 % nasal spray 2 sprays into each nostril 2 (Two) Times a Day. 9/18/17   Alphonso Johnson MD       ALLERGIES:  Glipizide; Phenergan [promethazine hcl]; Risperdal [risperidone]; Tramadol; and Reglan [metoclopramide]    REVIEW OF SYSTEMS  Review of Systems   Constitutional: Positive for appetite change. Negative for activity change, fatigue and fever.   HENT: Positive for congestion, ear pain and rhinorrhea. Negative for ear discharge and sore throat.    Eyes: Negative for pain and visual disturbance.   Respiratory: Negative for cough and shortness of breath.    Cardiovascular: Positive for chest pain. Negative for palpitations and leg swelling.   Gastrointestinal: Positive for nausea. Negative for abdominal pain and vomiting.   Endocrine: Negative for cold intolerance and heat intolerance.   Genitourinary: Negative for difficulty urinating and dysuria.   Musculoskeletal: Positive for arthralgias. Negative for gait problem.   Skin: Negative for color change and rash.   Neurological: Negative for dizziness, weakness and headaches.   Hematological: Negative for  adenopathy. Does not bruise/bleed easily.   Psychiatric/Behavioral: Negative for agitation, confusion and sleep disturbance.       PHYSICAL EXAM:  Temp:  [98.1 °F (36.7 °C)] 98.1 °F (36.7 °C)  Heart Rate:  [60-70] 68  Resp:  [20] 20  BP: (135-183)/(60-75) 156/64  Body mass index is 63.6 kg/(m^2).     Physical Exam   Constitutional: He is oriented to person, place, and time. He appears well-developed and well-nourished. No distress.   Morbidly obese   HENT:   Head: Normocephalic and atraumatic.   Right Ear: External ear normal.   Left Ear: External ear normal.   Nose: Nose normal.   Eyes: Conjunctivae are normal. Pupils are equal, round, and reactive to light. Right eye exhibits no discharge. Left eye exhibits no discharge. No scleral icterus.   Neck: Normal range of motion. Neck supple.   Cardiovascular: Normal rate, regular rhythm, normal heart sounds and intact distal pulses.  Exam reveals no gallop and no friction rub.    No murmur heard.  Pulmonary/Chest: Effort normal and breath sounds normal. No respiratory distress. He has no wheezes. He has no rales. He exhibits no tenderness.   Decreased air entry due to body habitus, but appears clear and equal bilaterally.   Abdominal: Soft. Bowel sounds are normal. He exhibits no distension and no mass. There is no tenderness. There is no rebound and no guarding.   Musculoskeletal: Normal range of motion.   Neurological: He is alert and oriented to person, place, and time.   Skin: Skin is warm and dry. No rash noted. He is not diaphoretic. No erythema. No pallor.   Psychiatric: He has a normal mood and affect. His behavior is normal.   Nursing note and vitals reviewed.      DIAGNOSTIC DATA:   Lab Results (last 24 hours)     Procedure Component Value Units Date/Time    CBC Auto Differential [[id number]]  (Abnormal) Collected:  09/21/17 1414    Specimen:  Blood Updated:  09/21/17 1439     WBC 7.14 10*3/mm3      RBC 4.40 10*6/mm3      Hemoglobin 11.7 (L) g/dL      Hematocrit  36.5 (L) %      MCV 83.0 fL      MCH 26.6 pg      MCHC 32.1 g/dL      RDW 15.0 (H) %      RDW-SD 45.8 (H) fl      MPV 9.2 fL      Platelets 141 (L) 10*3/mm3      Neutrophil % 75.4 %      Lymphocyte % 12.6 %      Monocyte % 8.0 %      Eosinophil % 2.8 %      Basophil % 0.1 %      Immature Grans % 1.1 (H) %      Neutrophils, Absolute 5.38 10*3/mm3      Lymphocytes, Absolute 0.90 10*3/mm3      Monocytes, Absolute 0.57 10*3/mm3      Eosinophils, Absolute 0.20 10*3/mm3      Basophils, Absolute 0.01 10*3/mm3      Immature Grans, Absolute 0.08 (H) 10*3/mm3     Comprehensive Metabolic Panel [[id number]]  (Abnormal) Collected:  09/21/17 1414    Specimen:  Blood Updated:  09/21/17 1510     Glucose 279 (H) mg/dL      BUN 15 mg/dL      Creatinine 0.78 mg/dL      Sodium 134 (L) mmol/L      Potassium 3.9 mmol/L      Chloride 96 mmol/L      CO2 30.0 mmol/L      Calcium 8.5 mg/dL      Total Protein 6.1 (L) g/dL      Albumin 3.40 g/dL      ALT (SGPT) 59 U/L      AST (SGOT) 60 (H) U/L      Alkaline Phosphatase 58 U/L      Total Bilirubin 1.1 mg/dL      eGFR Non African Amer 111 mL/min/1.73      Globulin 2.7 gm/dL      A/G Ratio 1.3 g/dL      BUN/Creatinine Ratio 19.2     Anion Gap 8.0 mmol/L     Troponin [[id number]]  (Normal) Collected:  09/21/17 1414    Specimen:  Blood Updated:  09/21/17 1517     Troponin I <0.012 ng/mL     BNP [[id number]]  (Normal) Collected:  09/21/17 1414    Specimen:  Blood Updated:  09/21/17 1518     proBNP 39.4 pg/mL     [place] [facility] [[id number]] Collected:  09/21/17 1414    Specimen:  Blood Updated:  09/21/17 1601    Narrative:       The following orders were created for panel order [place] Draw.  Procedure                               Abnormality         Status                     ---------                               -----------         ------                     Light Blue Top[[id number]]                                   Final result               Green Top (Gel)[[id number]]                                   Final result               Lavender Top[[id number]]                                     Final result               Gold Top - SST[[id number]]                                   Final result                 Please view results for these tests on the individual orders.    Light Blue Top [[id number]] Collected:  09/21/17 1414    Specimen:  Blood Updated:  09/21/17 1601     Extra Tube hold for add-on      Auto resulted       Green Top (Gel) [[id number]] Collected:  09/21/17 1414    Specimen:  Blood Updated:  09/21/17 1601     Extra Tube Hold for add-ons.      Auto resulted.       Lavender Top [[id number]] Collected:  09/21/17 1414    Specimen:  Blood Updated:  09/21/17 1601     Extra Tube hold for add-on      Auto resulted       Gold Top - SST [[id number]] Collected:  09/21/17 1414    Specimen:  Blood Updated:  09/21/17 1601     Extra Tube Hold for add-ons.      Auto resulted.       Scan Slide [[id number]] Collected:  09/21/17 1414    Specimen:  Blood Updated:  09/21/17 1739     Anisocytosis Slight/1+     Hypochromia Slight/1+     WBC Morphology Normal     Platelet Estimate Decreased    CBC & Differential [[id number]] Collected:  09/21/17 1414    Specimen:  Blood Updated:  09/21/17 1739    Narrative:       The following orders were created for panel order CBC & Differential.  Procedure                               Abnormality         Status                     ---------                               -----------         ------                     Scan Slide[[id number]]                                       Final result               CBC Auto Differential[[id number]]        Abnormal            Final result                 Please view results for these tests on the individual orders.    Troponin [[id number]]  (Normal) Collected:  09/21/17 1708    Specimen:  Blood Updated:  09/21/17 1740     Troponin I <0.012 ng/mL            Imaging Results (last 24 hours)     Procedure Component Value Units Date/Time    XR Chest 2 View [[id number]]  Collected:  09/21/17 1420     Updated:  09/21/17 1442    Narrative:         EXAM:          Radiograph(s), Chest   VIEWS:    PA / Lat ; 2       DATE/TIME:  9/21/2017 2:40 PM CDT               INDICATION:    Chest pain           COMPARISON:  CXR: 9/4/17          FINDINGS:             - lines/tubes:    none     - cardiac:         Mild cardiac silhouette enlargement,  unchanged.         - mediastinum: contour within normal limits         - lungs:         no focal air space process, pulmonary  interstitial edema, nodule(s)/mass             - pleura:         no evidence of  fluid                  - osseous:         unremarkable for age                  - misc.:         Impression:       CONCLUSION:        1. No evidence of an acute cardiopulmonary process.                                    Electronically signed by:  MIGUEL A Soni MD  9/21/2017 2:41  PM CDT Workstation: [id number]          I reviewed the patient's new clinical results.    ASSESSMENT AND PLAN: This is a 37 y.o. male with:    Active Hospital Problems (** Indicates Principal Problem)    Diagnosis Date Noted   • **Chest pain [R07.9] 03/25/2017   • Otitis media [H66.90] 09/21/2017   • Essential hypertension [I10] 05/23/2017   • Pulmonary embolism on long-term anticoagulation therapy [I26.99, Z79.01] 05/23/2017   • Type 2 diabetes mellitus [E11.9] 04/24/2017   • Chronic back pain [M54.9, G89.29]    • Hyperlipidemia [E78.5] 12/13/2016   • Morbid obesity [E66.01] 07/28/2016      Resolved Hospital Problems    Diagnosis Date Noted Date Resolved   No resolved problems to display.     BENITO. Enzymes negative x2. EKG showed no acute changes. Cardiology consulted.  Talked with Dr. Chahal who will see in the am.  Cardiac/Consistent carbohydrate diet.  Sliding scale insulin. Will hold metformin on change for dye.  Continue home medications for hypertension, hyperlipidemia, chronic pain, and pulmonary emboli.  Await cardiology recommendations.  Will continue  Augmentin for otitis media diagnosed outpatient.  Patient afebril.    Will monitor patient's hospital course and adjust treatment as hospital course dictates.    DVT prophylaxis: Heparin  Code status is Full Code   CARY # [id number], reviewed and consistent with patient reported medications.    I discussed the patients findings and my recommendations with patient and nursing staff.           This document has been electronically signed by Yared Mcnulty MD on September 21, 2017 6:38 PM

## 2017-09-21 NOTE — ED PROVIDER NOTES
Subjective   HPI Comments: She is a morbidly obese male who comes in with 2 hour onset of chest pain which is substernal vague and his description and patient rates the pain on the scale of 1-10 at about 8.  Patient takes Plavix and aspirin and he has taken a dose of medication already.  Patient has been evaluated about 6 months ago for pulmonary embolism and his CT angiogram was a poor study but patient was not found to have any major pulmonary embolism.  Patient also history of coronary artery disease and states that he has 2 stents.    Patient is a 37 y.o. male presenting with chest pain.   History provided by:  Patient  Chest Pain   Pain location:  Substernal area  Pain quality: pressure, sharp and stabbing    Pain radiates to:  Does not radiate  Onset quality:  Gradual  Timing:  Constant  Progression:  Waxing and waning  Chronicity:  Recurrent  Context: at rest    Context: not breathing, not drug use, not eating, not lifting, not movement, not raising an arm, not stress and not trauma    Relieved by:  Nothing  Worsened by:  Movement, exertion and coughing  Ineffective treatments:  None tried  Associated symptoms: cough and nausea    Associated symptoms: no abdominal pain, no altered mental status, no anorexia, no anxiety, no back pain, no claudication, no diaphoresis, no dizziness, no dysphagia, no fatigue, no fever, no headache, no heartburn, no lower extremity edema, no near-syncope, no numbness, no orthopnea, no palpitations, no PND, no shortness of breath, no syncope, no vomiting and no weakness    Risk factors: coronary artery disease, diabetes mellitus, high cholesterol, hypertension, male sex, obesity and smoking        Review of Systems   Constitutional: Negative for diaphoresis, fatigue and fever.   HENT: Negative for trouble swallowing.    Eyes: Negative.    Respiratory: Positive for cough. Negative for shortness of breath.    Cardiovascular: Positive for chest pain. Negative for palpitations,  "orthopnea, claudication, syncope, PND and near-syncope.   Gastrointestinal: Positive for nausea. Negative for abdominal pain, anorexia, heartburn and vomiting.   Endocrine: Negative.    Genitourinary: Negative.    Musculoskeletal: Negative for back pain.   Neurological: Negative for dizziness, weakness, numbness and headaches.   All other systems reviewed and are negative.      Past Medical History:   Diagnosis Date   • Allergic rhinitis    • Asthma    • Chest pain    • CHF (congestive heart failure)    • Chronic back pain    • Coronary artery disease     2 stents.  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\"   • GERD (gastroesophageal reflux disease)    • Hyperlipidemia    • Hypertension    • Low back pain    • Morbid obesity    • Pulmonary embolism    • RLS (restless legs syndrome)    • Seizures    • Sleep apnea        Allergies   Allergen Reactions   • Glipizide Other (See Comments)     Hallucinations     • Phenergan [Promethazine Hcl] Other (See Comments)     Burning veins   • Risperdal [Risperidone] Other (See Comments)     Slurred speech   • Tramadol Other (See Comments)     seizures   • Reglan [Metoclopramide] Anxiety       Past Surgical History:   Procedure Laterality Date   • ADENOIDECTOMY     • CARDIAC CATHETERIZATION N/A 3/15/2017    Procedure: Left Heart Cath;  Surgeon: Edwige Dillard MD;  Location: [facility] INVASIVE LOCATION;  Service:    • CARDIAC CATHETERIZATION N/A 3/15/2017    Procedure: Stent SOPHIA coronary;  Surgeon: Edwige Dillard MD;  Location: [facility] CATH INVASIVE LOCATION;  Service:    • CHOLECYSTECTOMY     • CORONARY ANGIOPLASTY WITH STENT PLACEMENT     • TX RT/LT HEART CATHETERS N/A 3/15/2017    Procedure: Percutaneous Coronary Intervention;  Surgeon: Edwige Dillard MD;  Location: [facility] INVASIVE LOCATION;  Service: Cardiovascular   • TONSILLECTOMY     • TRANSESOPHAGEAL ECHOCARDIOGRAM (MONICA)         Family History   Problem " Relation Age of Onset   • Cancer Other    • Coronary artery disease Other    • Diabetes Other    • Heart disease Other    • Hypertension Other        Social History     Social History   • Marital status:      Spouse name: Cori   • Number of children: 0   • Years of education: N/A     Occupational History   • Disabled      Social History Main Topics   • Smoking status: Former Smoker     Quit date: 1997   • Smokeless tobacco: Former User     Types: Snuff     Quit date: 2017   • Alcohol use No   • Drug use: No   • Sexual activity: Defer     Other Topics Concern   • None     Social History Narrative           Objective   Physical Exam   Constitutional: He is oriented to person, place, and time. He appears well-developed and well-nourished.   HENT:   Head: Normocephalic and atraumatic.   Mouth/Throat: Oropharynx is clear and moist.   Eyes: Conjunctivae are normal. Pupils are equal, round, and reactive to light.   Neck: Normal range of motion. Neck supple.   Cardiovascular: Normal rate, regular rhythm and normal heart sounds.    Pulmonary/Chest: Effort normal and breath sounds normal. No respiratory distress. He exhibits no tenderness.   Abdominal: Soft. He exhibits no distension. There is no tenderness.   Musculoskeletal: Normal range of motion. He exhibits no edema, tenderness or deformity.   Neurological: He is alert and oriented to person, place, and time.   Skin: Skin is warm and dry.   Psychiatric: He has a normal mood and affect.   Nursing note and vitals reviewed.      ECG 12 Lead    Date/Time: 9/21/2017 3:07 PM  Performed by: LADONNA BARRIENTOS  Authorized by: LADONNA BARRIENTOS   Interpreted by physician  Comparison: compared with previous ECG   Similar to previous ECG  Rhythm: sinus rhythm  Rate: normal  QRS axis: normal  Conduction: conduction normal  ST Segments: ST segments normal  T Waves: T waves normal  Other: no other findings  Clinical impression: normal ECG               ED Course  ED Course         Labs Reviewed   COMPREHENSIVE METABOLIC PANEL - Abnormal; Notable for the following:        Result Value    Glucose 279 (*)     Sodium 134 (*)     Total Protein 6.1 (*)     AST (SGOT) 60 (*)     All other components within normal limits   CBC WITH AUTO DIFFERENTIAL - Abnormal; Notable for the following:     Hemoglobin 11.7 (*)     Hematocrit 36.5 (*)     RDW 15.0 (*)     RDW-SD 45.8 (*)     Platelets 141 (*)     Immature Grans % 1.1 (*)     Immature Grans, Absolute 0.08 (*)     All other components within normal limits   TROPONIN (IN-HOUSE) - Normal   BNP (IN-HOUSE) - Normal   RAINBOW DRAW    Narrative:     The following orders were created for panel order [place] Draw.  Procedure                               Abnormality         Status                     ---------                               -----------         ------                     Light Blue Top[[id number]]                                   Final result               Green Top (Gel)[[id number]]                                  Final result               Lavender Top[[id number]]                                     Final result               Gold Top - SST[[id number]]                                   Final result                 Please view results for these tests on the individual orders.   TROPONIN (IN-HOUSE)   SCAN SLIDE   LIGHT BLUE TOP   GREEN TOP   LAVENDER TOP   GOLD TOP - SST   CBC AND DIFFERENTIAL    Narrative:     The following orders were created for panel order CBC & Differential.  Procedure                               Abnormality         Status                     ---------                               -----------         ------                     Scan Slide[[id number]]                                       In process                 CBC Auto Differential[[id number]]        Abnormal            Final result                 Please view results for these tests on the individual orders.       XR Chest 2 View   Final Result   CONCLUSION:           1. No evidence of an acute cardiopulmonary process.                                         Electronically signed by:  MIGUEL A Soni MD  9/21/2017 2:41   PM CDT Workstation: [id number]                [facility]  Number of Diagnoses or Management Options  Precordial pain: established and worsening  Diagnosis management comments: Patient has recent angioplasty done in March of this year for coronary artery disease and comes in with chest pain and has normal EKG and laboratory studies.  Patient is only a few hours duration.  Patient case was assumed hospitalist and how was advised to contact the cardiologist and currently cardiology is tied up in Cath Lab and he advised that patient can be admitted and he can see patient for further evaluation and treatment       Amount and/or Complexity of Data Reviewed  Clinical lab tests: reviewed  Tests in the radiology section of CPT®: reviewed  Tests in the medicine section of CPT®: reviewed  Discussion of test results with the performing providers: yes  Obtain history from someone other than the patient: yes  Discuss the patient with other providers: yes    Risk of Complications, Morbidity, and/or Mortality  Presenting problems: high  Diagnostic procedures: high  Management options: high    Patient Progress  Patient progress: stable      Final diagnoses:   Precordial pain            Mil Shaw MD  09/21/17 4535

## 2017-09-21 NOTE — ED TRIAGE NOTES
"C/O chest pain, left of sternum. Started as pressure \"several hours ago while doing dishes.\" Then states started having jabbing pains and became light-headed. C/O diaphoresis, no nausea, no dyspnea.  "

## 2017-09-22 ENCOUNTER — APPOINTMENT (OUTPATIENT)
Dept: CT IMAGING | Facility: HOSPITAL | Age: 37
End: 2017-09-22

## 2017-09-22 VITALS
SYSTOLIC BLOOD PRESSURE: 151 MMHG | TEMPERATURE: 98.5 F | WEIGHT: 315 LBS | OXYGEN SATURATION: 95 % | BODY MASS INDEX: 44.1 KG/M2 | HEART RATE: 63 BPM | DIASTOLIC BLOOD PRESSURE: 64 MMHG | RESPIRATION RATE: 22 BRPM | HEIGHT: 71 IN

## 2017-09-22 LAB
GLUCOSE BLDC GLUCOMTR-MCNC: 183 MG/DL (ref 70–130)
GLUCOSE BLDC GLUCOMTR-MCNC: 186 MG/DL (ref 70–130)

## 2017-09-22 PROCEDURE — 90682 RIV4 VACC RECOMBINANT DNA IM: CPT | Performed by: FAMILY MEDICINE

## 2017-09-22 PROCEDURE — G0378 HOSPITAL OBSERVATION PER HR: HCPCS

## 2017-09-22 PROCEDURE — 25010000002 INFLUENZA VAC SPLIT QUAD SUSPENSION: Performed by: FAMILY MEDICINE

## 2017-09-22 PROCEDURE — 70487 CT MAXILLOFACIAL W/DYE: CPT

## 2017-09-22 PROCEDURE — 0 IOPAMIDOL 61 % SOLUTION: Performed by: FAMILY MEDICINE

## 2017-09-22 PROCEDURE — 82962 GLUCOSE BLOOD TEST: CPT

## 2017-09-22 PROCEDURE — G0008 ADMIN INFLUENZA VIRUS VAC: HCPCS | Performed by: FAMILY MEDICINE

## 2017-09-22 PROCEDURE — 99219 PR INITIAL OBSERVATION CARE/DAY 50 MINUTES: CPT | Performed by: INTERNAL MEDICINE

## 2017-09-22 PROCEDURE — 63710000001 INSULIN ASPART PER 5 UNITS: Performed by: FAMILY MEDICINE

## 2017-09-22 RX ORDER — CLOPIDOGREL BISULFATE 75 MG/1
75 TABLET ORAL NIGHTLY
Qty: 30 TABLET | Refills: 1 | Status: ON HOLD | OUTPATIENT
Start: 2017-09-22 | End: 2017-10-10

## 2017-09-22 RX ADMIN — IOPAMIDOL 94 ML: 612 INJECTION, SOLUTION INTRAVENOUS at 11:15

## 2017-09-22 RX ADMIN — LISINOPRIL 40 MG: 40 TABLET ORAL at 08:40

## 2017-09-22 RX ADMIN — AMOXICILLIN AND CLAVULANATE POTASSIUM 1 TABLET: 875; 125 TABLET, FILM COATED ORAL at 08:41

## 2017-09-22 RX ADMIN — RANOLAZINE 1000 MG: 500 TABLET, FILM COATED, EXTENDED RELEASE ORAL at 08:41

## 2017-09-22 RX ADMIN — INSULIN ASPART 2 UNITS: 100 INJECTION, SOLUTION INTRAVENOUS; SUBCUTANEOUS at 11:53

## 2017-09-22 RX ADMIN — HYDROCODONE BITARTRATE AND ACETAMINOPHEN 1 TABLET: 5; 325 TABLET ORAL at 11:48

## 2017-09-22 RX ADMIN — INFLUENZA A VIRUS A/MICHIGAN/45/2015 X-275 (H1N1) ANTIGEN (FORMALDEHYDE INACTIVATED), INFLUENZA A VIRUS A/HONG KONG/4801/2014 X-263B (H3N2) ANTIGEN (FORMALDEHYDE INACTIVATED), INFLUENZA B VIRUS B/PHUKET/3073/2013 ANTIGEN (FORMALDEHYDE INACTIVATED), AND INFLUENZA B VIRUS B/BRISBANE/60/2008 ANTIGEN (FORMALDEHYDE INACTIVATED) 0.5 ML: 15; 15; 15; 15 INJECTION, SUSPENSION INTRAMUSCULAR at 16:19

## 2017-09-22 RX ADMIN — Medication 10 ML: at 05:58

## 2017-09-22 RX ADMIN — INSULIN ASPART 2 UNITS: 100 INJECTION, SOLUTION INTRAVENOUS; SUBCUTANEOUS at 08:36

## 2017-09-22 RX ADMIN — HYDROCODONE BITARTRATE AND ACETAMINOPHEN 1 TABLET: 5; 325 TABLET ORAL at 05:51

## 2017-09-22 RX ADMIN — ATORVASTATIN CALCIUM 20 MG: 20 TABLET, FILM COATED ORAL at 08:41

## 2017-09-22 RX ADMIN — ISOSORBIDE MONONITRATE 120 MG: 60 TABLET, EXTENDED RELEASE ORAL at 08:40

## 2017-09-22 NOTE — CONSULTS
"Adult Nutrition  Assessment    Patient Name:  Yordy Hansen  YOB: 1980  MRN: [id number]  Admit Date:  9/21/2017    Assessment Date:  9/22/2017          Reason for Assessment       09/22/17 1405    Reason for Assessment    Reason For Assessment/Visit education;per organizational policy    Identified At Risk By Screening Criteria BMI                  Anthropometrics       09/22/17 1405    Anthropometrics    Height 180.3 cm (71\")    Weight (!)  211 kg (465 lb)    RD Documented Weight on Admission 207 kg (456 lb)    Anthropometrics (Special Considerations)    Height Used for Calculations 1.803 m (5' 11\")    Weight Used for Calculations 211 kg (465 lb)    RD Calculated     RD Calculated %     Ideal Body Weight (IBW)    Ideal Body Weight (IBW), Male (kg) 79.27    % Ideal Body Weight 266.62    Body Mass Index (BMI)    BMI (kg/m2) 64.99    BMI Grade greater than 40 - obesity grade III              Physical Findings       09/22/17 1245    Physical Findings/Assessment    Additional Documentation Physical Appearance (Group)    Physical Appearance    Overall Physical Appearance obese            Estimated/Assessed Needs       09/22/17 1246    Calculation Measurements    Weight Used For Calculations 211 kg (465 lb)    Height Used for Calculations 1.803 m (5' 11\")    Estimated/Assessed Energy Needs    Energy Need Method Kcal/kg;[place] Jeor    kcal/kg 14    14 Kcal/Kg (kcal) 2952.92    Age 37    RMR ([place]-St[place] Jeor Equation) 3046.36    Activity Factors ([place] St Jeor)  Confined to bed  1.2    Total estimated needs ([place] St[place] Jeor) 2952-059=7089    Estimated/Assessed Protein Needs    Weight Used for Protein Calculation 78 kg (172 lb)    Estimated Protein Range 78g    Estimated/Assessed Fluid Needs    Fluid Need Method RDA method    RDA Method (mL)  2400            Nutrition Prescription Ordered       09/22/17 1406    Nutrition Prescription PO    Current PO Diet Regular    Common Modifiers " Cardiac;Consistent Carbohydrate              Comments:      Pt has BMI 64.9 compatible with morbid obesity at 270% IBW.  Pt thought he had been losing wt, but wt was up from stated wt.  Previously educated by RD staff.  Pt had a headache at time of RD visit.  RD encouraged pt to avoid sugary beverages and to be mindful of portion sizes, suggesting 2100 calories daily for wt loss.  Left pt information about the DASH diet and discussed the Plate Method of healthy eating emphasizing intake of vegetables.  Will provide services as needed.        Electronically signed by:  Lesli Leggett RD  09/22/17 2:06 PM

## 2017-09-22 NOTE — CONSULTS
"      Patient Name: Yordy Hansen  Age/Sex: 37 y.o. male  : 1980  MRN: [id number]    Date of Hospital Visit: 2017  Encounter Provider: Edwige Dillard MD  Referring Provider: Yenifer Justice MD         Subjective:       Chief Complaint: Chest Pain    History of Present Illness:  Yordy Hansen is a 37 y.o. male presented to the hospital with chief complaint of chest discomfort which was retrosternal in location associated with shortness of breath and dizziness.  Patient also complains of earache bilateral jaw ache and congestion-like symptoms.  Currently his chest pain has resolved.  He he has normal chest pain or chest discomfort but he continues to complain of aching in his general on both sides as well as headache.  Patient is concerned of possible ear infection.    Last Echo: 17  · Left ventricular systolic function is normal.  · Left ventricular diastolic dysfunction (grade I) consistent with impaired relaxation.  · All left ventricular wall segments contract normally  · Estimated EF appears to be in the range of 56 - 60%.        Last Cath: Mar 15, 2017  · Successful stent placement to the mid LAD 2.5 x 18 mm xience alpine stent which was postdilated with a 3 x 15 mm noncompliant balloon at high pressure of 16 lucy    Past Medical History:  Past Medical History:   Diagnosis Date   • Allergic rhinitis    • Asthma    • Chest pain    • CHF (congestive heart failure)    • Chronic back pain    • Coronary artery disease     2 stents.  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\"   • GERD (gastroesophageal reflux disease)    • Hyperlipidemia    • Hypertension    • Low back pain    • Morbid obesity    • Pulmonary embolism    • RLS (restless legs syndrome)    • Seizures    • Sleep apnea        Past Surgical History:   Procedure Laterality Date   • ADENOIDECTOMY     • CARDIAC CATHETERIZATION N/A 3/15/2017    Procedure: Left Heart Cath;  " Surgeon: Edwige Briceno MD;  Location: [facility] CATH INVASIVE LOCATION;  Service:    • CARDIAC CATHETERIZATION N/A 3/15/2017    Procedure: Stent SOPHIA coronary;  Surgeon: Edwige Briceno MD;  Location: [facility] CATH INVASIVE LOCATION;  Service:    • CHOLECYSTECTOMY     • CORONARY ANGIOPLASTY WITH STENT PLACEMENT     • UT RT/LT HEART CATHETERS N/A 3/15/2017    Procedure: Percutaneous Coronary Intervention;  Surgeon: Edwige Briceno MD;  Location: [facility] CATH INVASIVE LOCATION;  Service: Cardiovascular   • TONSILLECTOMY     • TRANSESOPHAGEAL ECHOCARDIOGRAM (MONICA)         Home Medications:    Prescriptions Prior to Admission   Medication Sig Dispense Refill Last Dose   • albuterol (ACCUNEB) 1.25 MG/3ML nebulizer solution Take 1 ampule by nebulization Every 4 (Four) Hours As Needed for Wheezing.   Past Week at Unknown time   • amLODIPine (NORVASC) 10 MG tablet Take 1 tablet by mouth Every Night. 30 tablet 5 9/20/2017 at 2100   • amoxicillin (AMOXIL) 500 MG capsule Take 1 capsule by mouth 3 (Three) Times a Day. 30 capsule 0 Past Week at Unknown time   • amoxicillin-clavulanate (AUGMENTIN) 875-125 MG per tablet Take 1 tablet by mouth 2 (Two) Times a Day. 20 tablet 0 9/21/2017 at 0900   • Canagliflozin (INVOKANA) 300 MG tablet Take 300 mg by mouth Every Morning Before Breakfast for 90 days. 90 tablet 0 Past Month at Unknown time   • guaiFENesin (MUCINEX) 600 MG 12 hr tablet Take 2 tablets by mouth 2 (Two) Times a Day. 20 tablet 0 9/20/2017 at 2100   • isosorbide mononitrate (IMDUR) 120 MG 24 hr tablet Take 1 tablet by mouth Every Morning. 30 tablet 5 9/20/2017 at 2100   • lisinopril (PRINIVIL,ZESTRIL) 40 MG tablet Take 1 tablet by mouth Every Morning. 30 tablet 5 9/21/2017 at 0900   • metFORMIN (GLUCOPHAGE) 1000 MG tablet Take 1 tablet by mouth 2 (Two) Times a Day With Meals. 60 tablet 5 9/21/2017 at 0900   • MethylPREDNISolone (MEDROL, ETELVINA,) 4 MG tablet Take as directed on package instructions. 21 tablet 0 9/21/2017 at Unknown time   •  metoprolol succinate XL (TOPROL-XL) 100 MG 24 hr tablet Take 1 tablet by mouth Every Night. 30 tablet 5 9/20/2017 at 2100   • MICROLET LANCETS misc USE TO TEST BLOOD SUGAR EVERY DAY AND AS NEEDED   Taking   • neomycin-polymyxin-hydrocortisone (CORTISPORIN) 3.5-10444-6 otic suspension       • pantoprazole (PROTONIX) 40 MG EC tablet Take 1 tablet by mouth Every Night. 30 tablet 5 9/20/2017 at 2100   • pramipexole (MIRAPEX) 1 MG tablet Take 2 tablets by mouth Every Night. Taking 2mg daily 30 tablet 5 9/20/2017 at 2100   • pravastatin (PRAVACHOL) 80 MG tablet Take 1 tablet by mouth Every Night. 30 tablet 5 9/20/2017 at 2100   • PredniSONE (DELTASONE) 10 MG (21) tablet pack Take 10 mg by mouth See Admin Instructions. Six pills day one; five pills day two; four pills day three; three pills day four; two pills day five; one pill day six   9/21/2017   • ranolazine (RANEXA) 1000 MG 12 hr tablet Take 1 tablet by mouth Every 12 (Twelve) Hours. 60 tablet 5 9/21/2017 at 0900   • rivaroxaban (XARELTO) 20 MG tablet Take 1 tablet by mouth Daily. (Patient taking differently: Take 20 mg by mouth Daily With Dinner.) 30 tablet 11 9/20/2017 at 2100   • sertraline (ZOLOFT) 25 MG tablet Take 1 tablet by mouth Daily. (Patient taking differently: Take 25 mg by mouth Every Night.) 30 tablet 5 9/20/2017 at 2100   • traZODone (DESYREL) 300 MG tablet Take 1 tablet by mouth Every Night. 30 tablet 5 9/20/2017 at 2100   • albuterol (PROVENTIL HFA;VENTOLIN HFA) 108 (90 BASE) MCG/ACT inhaler Inhale 2 puffs Every 4 (Four) Hours As Needed for Wheezing.   More than a month at Unknown time   • clopidogrel (PLAVIX) 75 MG tablet Take 1 tablet by mouth Daily. (Patient taking differently: Take 75 mg by mouth Every Night.) 30 tablet 1 More than a month at Unknown time   • guaiFENesin (MUCINEX) 600 MG 12 hr tablet Take 1 tablet by mouth 2 (Two) Times a Day. 14 tablet 0    • nitroglycerin (NITROSTAT) 0.4 MG SL tablet Place 1 tablet under the tongue Every 5  (Five) Minutes As Needed for Chest Pain. 25 tablet 6 More than a month at Unknown time   • ondansetron ODT (ZOFRAN-ODT) 4 MG disintegrating tablet Take 1 tablet by mouth Every 6 (Six) Hours As Needed for Nausea or Vomiting. 12 tablet 0 More than a month at Unknown time   • oxymetazoline (AFRIN) 0.05 % nasal spray 2 sprays into each nostril 2 (Two) Times a Day. 20 mL 0 Unknown at Unknown time       Allergies:  Allergies   Allergen Reactions   • Glipizide Other (See Comments)     Hallucinations     • Phenergan [Promethazine Hcl] Other (See Comments)     Burning veins   • Risperdal [Risperidone] Other (See Comments)     Slurred speech   • Tramadol Other (See Comments)     seizures   • Reglan [Metoclopramide] Anxiety       Past Social History:  Social History     Social History   • Marital status:      Spouse name: Cori   • Number of children: 0   • Years of education: N/A     Occupational History   • Disabled      Social History Main Topics   • Smoking status: Former Smoker     Quit date: 1997   • Smokeless tobacco: Former User     Types: Snuff     Quit date: 2017   • Alcohol use No   • Drug use: No   • Sexual activity: Defer     Other Topics Concern   • None     Social History Narrative           Review of Systems:   Constitution: No chills, no rigors, no unexplained weight loss or weight gain  Eyes:  No diplopia, no blurred vision, no loss of vision, conjunctiva is pink and sclera is anicteric  ENT:  No tinnitus, no otorrhea, no epistaxis, no sore throat , complains of chest pain and headache  Respiratory: No cough, no hemoptysis  Cardiovascular: Chest pain  Gastrointestinal: No nausea, no vomiting, no hematemesis, no diarrhea or constipation, no melena  Genitourinary: No frequency of dysuria no hematuria  Integument: positive for  No pruritis and  no skin rash  Hematologic / Lymphatic: No excessive bleeding, easy bruising, fatigue, lymphadenopathy and petechiae  Musculoskeletal: No joint pain, joint  stiffness, joint swelling, muscle pain, muscle weakness and neck pain  Neurological: No dizziness, headaches, light headedness, seizures and vertigo  Behavioral/Psych: No depression, homicidal ideations and suicidal ideations  Endocrine: No frequent urination and nocturia, temperature intolerance, weight gain, unintended and weight loss, unintended      Objective:     Objective:  Vital Signs (last 24 hours)       09/21 0700  -  09/22 0659 09/22 0700  -  09/22 0910   Most Recent    Temp (°F) 98.1 -  98.5      98.1     98.1 (36.7)    Heart Rate 60 -  70    61 -  62     62    Resp 19 -  20      20     20    /63 -  (!) 183/73      147/68     147/68    SpO2 (%) 94 -  97      97     97          Body mass index is 64.85 kg/(m^2).  Weight change:           Physical Exam:   General Appearance:    Alert, oriented, cooperative, in no acute distress   Head:    Normocephalic, atraumatic, without obvious abnormality   Eyes:            Lids and lashes normal, conjunctivae and sclerae normal, no   icterus, no pallor   Ears:    Ears appear intact with no abnormalities noted   Throat:   Mucous membranes pink and moist   Neck:   Supple, trachea midline, no carotid bruit, no JVD   Lungs:     Clear to auscultation, respirations regular, even and                   Unlabored. No wheezes, rales, rhonchi    Heart:    Regular rhythm and normal rate, normal S1 and S2, no            murmur, no gallop, no rub, no click   Abdomen:     Normal bowel sounds, no masses, liver and spleen nonpalpable, soft, non-tender, non-distended, no guarding, no rebound tenderness   Genitalia:    Deferred   Extremities:   Moves all extremities well, no edema, no cyanosis, no              Redness, no clubbing   Pulses:   Pulses palpable and equal bilaterally   Skin:   No bleeding, bruising or rash   Neurologic:   Alert and oriented to person, place, and time. No focal neurological deficits       Lab Review:       Results from last 7 days  Lab Units  09/21/17  1414   SODIUM mmol/L 134*   POTASSIUM mmol/L 3.9   CHLORIDE mmol/L 96   CO2 mmol/L 30.0   BUN mg/dL 15   CREATININE mg/dL 0.78   CALCIUM mg/dL 8.5   BILIRUBIN mg/dL 1.1   ALK PHOS U/L 58   ALT (SGPT) U/L 59   AST (SGOT) U/L 60*   GLUCOSE mg/dL 279*       Results from last 7 days  Lab Units 09/21/17  1708 09/21/17  1414   TROPONIN I ng/mL <0.012 <0.012           Results from last 7 days  Lab Units 09/21/17  1414   WBC 10*3/mm3 7.14   HEMOGLOBIN g/dL 11.7*   HEMATOCRIT % 36.5*   PLATELETS 10*3/mm3 141*                       Invalid input(s):  T4,  FREET4    EKG:   ECG/EMG Results (last 24 hours)     Procedure Component Value Units Date/Time    SCANNED EKG [[id number]] Resulted:  09/21/17      Updated:  09/21/17 1414    ECG 12 Lead [[id number]] Collected:  09/21/17 1344     Updated:  09/21/17 1709          Imaging:  Imaging Results (last 24 hours)     Procedure Component Value Units Date/Time    XR Chest 2 View [[id number]] Collected:  09/21/17 1420     Updated:  09/21/17 1442    Narrative:         EXAM:          Radiograph(s), Chest   VIEWS:    PA / Lat ; 2       DATE/TIME:  9/21/2017 2:40 PM CDT               INDICATION:    Chest pain           COMPARISON:  CXR: 9/4/17          FINDINGS:             - lines/tubes:    none     - cardiac:         Mild cardiac silhouette enlargement,  unchanged.         - mediastinum: contour within normal limits         - lungs:         no focal air space process, pulmonary  interstitial edema, nodule(s)/mass             - pleura:         no evidence of  fluid                  - osseous:         unremarkable for age                  - misc.:         Impression:       CONCLUSION:        1. No evidence of an acute cardiopulmonary process.                                    Electronically signed by:  MIGUEL A Soni MD  9/21/2017 2:41  PM CDT Workstation: [facility] MERCER personally viewed and interpreted the patient's EKG/Telemetry data.    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 The incidence of   this mutation in the [place]  population is about 2% and in the    population it is approximately 0.5%.   · Being heterozygous for a prothrombin mutation increases the risk for developing venous   thrombosis about 2 to 3 times above the general population risk.  · Being homozygous for the prothrombin gene mutation increases the relative   risk for venous thrombosis further, although it is not yet known how   much further the risk is increased  -----------------------------------------------------------------------------------------------------------------------    Morbid obesity    Chronic back pain   Type 2 diabetes mellitus    Pulmonary embolism on long-term anticoagulation therapy    Essential hypertension    Otitis media      Plan:     · Continue with risk factor modification  · Consider restarting Plavix 75 mg by mouth daily  · Consults hematology regarding continuation of Xarelto  · Cardiac workup indicated at this time.  · If patient continues to have symptoms we will consider repeating diagnostic cardiac catheterization          This document has been electronically signed by Edwige Briceno MD on September 22, 2017 9:21 AM     Edwige Briceno MD  09/22/17  9:10 AM

## 2017-09-22 NOTE — DISCHARGE SUMMARY
[facility] Medicine Services  DISCHARGE SUMMARY       Date of Admission: 9/21/2017  Date of Discharge:  9/22/2017  Primary Care Physician: LALA Barajas    Presenting Problem/History of Present Illness:  Precordial pain [R07.2]  Precordial pain [R07.2]     Final Discharge Diagnoses:  Hospital Problem List     * (Principal)Chest pain    Hyperlipidemia (Chronic)    Morbid obesity (Chronic)    Overview Deleted 4/24/2017  1:14 PM by Rehan Griffiths MD            Chronic back pain (Chronic)    Type 2 diabetes mellitus (Chronic)    Pulmonary embolism on long-term anticoagulation therapy    Essential hypertension    Otitis media          Consults:   Consults     Date and Time Order Name Status Description    9/21/2017 1913 Inpatient Consult to Cardiology Completed         Pertinent Test Results:     Lab Results (last 24 hours)     Procedure Component Value Units Date/Time    CBC Auto Differential [[id number]]  (Abnormal) Collected:  09/21/17 1414    Specimen:  Blood Updated:  09/21/17 1439     WBC 7.14 10*3/mm3      RBC 4.40 10*6/mm3      Hemoglobin 11.7 (L) g/dL      Hematocrit 36.5 (L) %      MCV 83.0 fL      MCH 26.6 pg      MCHC 32.1 g/dL      RDW 15.0 (H) %      RDW-SD 45.8 (H) fl      MPV 9.2 fL      Platelets 141 (L) 10*3/mm3      Neutrophil % 75.4 %      Lymphocyte % 12.6 %      Monocyte % 8.0 %      Eosinophil % 2.8 %      Basophil % 0.1 %      Immature Grans % 1.1 (H) %      Neutrophils, Absolute 5.38 10*3/mm3      Lymphocytes, Absolute 0.90 10*3/mm3      Monocytes, Absolute 0.57 10*3/mm3      Eosinophils, Absolute 0.20 10*3/mm3      Basophils, Absolute 0.01 10*3/mm3      Immature Grans, Absolute 0.08 (H) 10*3/mm3     Comprehensive Metabolic Panel [[id number]]  (Abnormal) Collected:  09/21/17 1414    Specimen:  Blood Updated:  09/21/17 1510     Glucose 279 (H) mg/dL      BUN 15 mg/dL      Creatinine 0.78 mg/dL      Sodium 134 (L) mmol/L      Potassium 3.9 mmol/L       Chloride 96 mmol/L      CO2 30.0 mmol/L      Calcium 8.5 mg/dL      Total Protein 6.1 (L) g/dL      Albumin 3.40 g/dL      ALT (SGPT) 59 U/L      AST (SGOT) 60 (H) U/L      Alkaline Phosphatase 58 U/L      Total Bilirubin 1.1 mg/dL      eGFR Non African Amer 111 mL/min/1.73      Globulin 2.7 gm/dL      A/G Ratio 1.3 g/dL      BUN/Creatinine Ratio 19.2     Anion Gap 8.0 mmol/L     Troponin [[id number]]  (Normal) Collected:  09/21/17 1414    Specimen:  Blood Updated:  09/21/17 1517     Troponin I <0.012 ng/mL     BNP [[id number]]  (Normal) Collected:  09/21/17 1414    Specimen:  Blood Updated:  09/21/17 1518     proBNP 39.4 pg/mL     [place] Draw [[id number]] Collected:  09/21/17 1414    Specimen:  Blood Updated:  09/21/17 1601    Narrative:       The following orders were created for panel order [place] Draw.  Procedure                               Abnormality         Status                     ---------                               -----------         ------                     Light Blue Top[[id number]]                                   Final result               Green Top (Gel)[[id number]]                                  Final result               Lavender Top[[id number]]                                     Final result               Gold Top - SST[[id number]]                                   Final result                 Please view results for these tests on the individual orders.    Light Blue Top [[id number]] Collected:  09/21/17 1414    Specimen:  Blood Updated:  09/21/17 1601     Extra Tube hold for add-on      Auto resulted       Green Top (Gel) [[id number]] Collected:  09/21/17 1414    Specimen:  Blood Updated:  09/21/17 1601     Extra Tube Hold for add-ons.      Auto resulted.       Lavender Top [[id number]] Collected:  09/21/17 1414    Specimen:  Blood Updated:  09/21/17 1601     Extra Tube hold for add-on      Auto resulted       Gold Top - SST [[id number]] Collected:  09/21/17 1414    Specimen:  Blood Updated:   09/21/17 1601     Extra Tube Hold for add-ons.      Auto resulted.       Scan Slide [[id number]] Collected:  09/21/17 1414    Specimen:  Blood Updated:  09/21/17 1739     Anisocytosis Slight/1+     Hypochromia Slight/1+     WBC Morphology Normal     Platelet Estimate Decreased    CBC & Differential [[id number]] Collected:  09/21/17 1414    Specimen:  Blood Updated:  09/21/17 1739    Narrative:       The following orders were created for panel order CBC & Differential.  Procedure                               Abnormality         Status                     ---------                               -----------         ------                     Scan Slide[[id number]]                                       Final result               CBC Auto Differential[[id number]]        Abnormal            Final result                 Please view results for these tests on the individual orders.    Troponin [[id number]]  (Normal) Collected:  09/21/17 1708    Specimen:  Blood Updated:  09/21/17 1740     Troponin I <0.012 ng/mL     POC Glucose Fingerstick [[id number]]  (Abnormal) Collected:  09/21/17 2047    Specimen:  Blood Updated:  09/21/17 2059     Glucose 151 (H) mg/dL       RN NotifiedMeter: [id number]: [id number] [facility]       POC Glucose Fingerstick [[id number]]  (Abnormal) Collected:  09/22/17 0555    Specimen:  Blood Updated:  09/22/17 0608     Glucose 183 (H) mg/dL       RN NotifiedMeter: [id number]: [id number] [facility]       POC Glucose Fingerstick [[id number]]  (Abnormal) Collected:  09/22/17 1113    Specimen:  Blood Updated:  09/22/17 1124     Glucose 186 (H) mg/dL       RN NotifiedMeter: [id number]: [id number] [facility] Course:  The patient is a 37 y.o. male who presented to [facility] with What was initially reported as chest pain.  Patient was evaluated by Dr. Briceno, his cardiologist, who determined that there is no inpatient cardiac workup  "appropriate at this time.  Patient has had multiple cardiac catheterizations that demonstrated no significant or obstructing disease.  Patient is currently on Augmentin for a sinusitis.  As patient was having continued symptoms, maxillofacial CT was performed.  CT showed no acute disease.  As patient is still having some maxillofacial discomfort as well as anterior cervical discomfort, we will refer him to ears nose and throat.  Patient will also follow-up with his primary care provider.  He was instructed to finish his Augmentin course.  Patient is on Plavix per Dr. Briceno, he has been given refills for this medication.  Patient is also on xarelto for history of pulmonary malaise and.  At time of discharge patient had no chest pain, shortness of air, nausea, vomiting.  He had eaten multiple meals and had been walking the halls without any distress.  Again, patient will be set up to see an ENT for his continuing sinus concerns.  Should be noted the patient denies thunderclap headache, worst headache of life, vision changes, night sweats, exposure to infectious disease.  No focal weakness, facial drooping, or slurring of speech.  Patient was counseled extensively on lifestyle modification, glucose and diet modification, and hypertension control, as well as weight reduction.    Condition on Discharge:  Stable    Physical Exam on Discharge:  /64 (BP Location: Right arm, Patient Position: Sitting)  Pulse 69  Temp 98.5 °F (36.9 °C) (Temporal Artery )   Resp 22  Ht 71\" (180.3 cm)  Wt (!) 465 lb (211 kg)  SpO2 95%  BMI 64.85 kg/m2  Physical Exam   Constitutional: He is oriented to person, place, and time. He appears well-developed and well-nourished. No distress.   HENT:   Head: Normocephalic and atraumatic.   Eyes: Conjunctivae are normal.   Cardiovascular: Normal rate and regular rhythm.    Pulmonary/Chest: Effort normal and breath sounds normal. No respiratory distress. He has no wheezes.   Abdominal: Soft. " Bowel sounds are normal. He exhibits no distension. There is no tenderness.   Neurological: He is alert and oriented to person, place, and time.   Skin: Skin is warm and dry. He is not diaphoretic.   Psychiatric: He has a normal mood and affect. His behavior is normal.     Discharge Disposition:  Home or Self Care    Discharge Medications:   Hansen Yordy Sean   Home Medication Instructions ROCKY:[id number]    Printed on:09/22/17 7472   Medication Information                      albuterol (ACCUNEB) 1.25 MG/3ML nebulizer solution  Take 1 ampule by nebulization Every 4 (Four) Hours As Needed for Wheezing.             albuterol (PROVENTIL HFA;VENTOLIN HFA) 108 (90 BASE) MCG/ACT inhaler  Inhale 2 puffs Every 4 (Four) Hours As Needed for Wheezing.             amLODIPine (NORVASC) 10 MG tablet  Take 1 tablet by mouth Every Night.             amoxicillin-clavulanate (AUGMENTIN) 875-125 MG per tablet  Take 1 tablet by mouth 2 (Two) Times a Day.             Canagliflozin (INVOKANA) 300 MG tablet  Take 300 mg by mouth Every Morning Before Breakfast for 90 days.             clopidogrel (PLAVIX) 75 MG tablet  Take 1 tablet by mouth Every Night.             guaiFENesin (MUCINEX) 600 MG 12 hr tablet  Take 1 tablet by mouth 2 (Two) Times a Day.             isosorbide mononitrate (IMDUR) 120 MG 24 hr tablet  Take 1 tablet by mouth Every Morning.             lisinopril (PRINIVIL,ZESTRIL) 40 MG tablet  Take 1 tablet by mouth Every Morning.             metoprolol succinate XL (TOPROL-XL) 100 MG 24 hr tablet  Take 1 tablet by mouth Every Night.             MICROLET LANCETS misc  USE TO TEST BLOOD SUGAR EVERY DAY AND AS NEEDED             neomycin-polymyxin-hydrocortisone (CORTISPORIN) 3.5-40899-7 otic suspension               nitroglycerin (NITROSTAT) 0.4 MG SL tablet  Place 1 tablet under the tongue Every 5 (Five) Minutes As Needed for Chest Pain.             ondansetron ODT (ZOFRAN-ODT) 4 MG disintegrating tablet  Take 1 tablet by  mouth Every 6 (Six) Hours As Needed for Nausea or Vomiting.             oxymetazoline (AFRIN) 0.05 % nasal spray  2 sprays into each nostril 2 (Two) Times a Day.             pantoprazole (PROTONIX) 40 MG EC tablet  Take 1 tablet by mouth Every Night.             pramipexole (MIRAPEX) 1 MG tablet  Take 2 tablets by mouth Every Night. Taking 2mg daily             pravastatin (PRAVACHOL) 80 MG tablet  Take 1 tablet by mouth Every Night.             PredniSONE (DELTASONE) 10 MG (21) tablet pack  Take 10 mg by mouth See Admin Instructions. Six pills day one; five pills day two; four pills day three; three pills day four; two pills day five; one pill day six             ranolazine (RANEXA) 1000 MG 12 hr tablet  Take 1 tablet by mouth Every 12 (Twelve) Hours.             rivaroxaban (XARELTO) 20 MG tablet  Take 1 tablet by mouth Daily.             sertraline (ZOLOFT) 25 MG tablet  Take 1 tablet by mouth Daily.             traZODone (DESYREL) 300 MG tablet  Take 1 tablet by mouth Every Night.                 Discharge Diet:   Diet Instructions     Diet: Consistent Carbohydrate, Cardiac; Thin       Discharge Diet:   Consistent Carbohydrate  Cardiac      Fluid Consistency:  Thin                 Activity at Discharge:   Activity Instructions     Activity as Tolerated                     Discharge Care Plan/Instructions:  Patient has been cleared by cardiology and cardiology states that there is absolutely no cardiac workup appropriate or necessary at this time.  Patient has had multiple cardiac catheterizations in the past which demonstrate no significant or obstructive disease.  Patient does complain of some sinus and anterior neck pain.  CT, maxillofacial, was normal.  Will be referred to ENT.  Will also be referred back to his primary care provider within one week.  Return with fever, chills, nausea, vomiting, worst headache of life, thunderclap headache, vision changes, syncope, presyncope, any worsening or concerning  symptoms.    Follow-up Appointments:   Future Appointments  Date Time Provider Department Center   9/26/2017 4:00 PM Robinson Gan MD [facility] None   9/29/2017 10:15 AM Earnest Perez MD [facility] None   10/17/2017 11:00 AM Earnest Perez MD [facility] None   1/4/2018 1:30 PM Edwige Briceno MD [facility] POW None         This document has been electronically signed by HENNA Rodriguez on September 22, 2017 2:35 PM

## 2017-09-27 ENCOUNTER — TELEPHONE (OUTPATIENT)
Dept: GASTROENTEROLOGY | Facility: CLINIC | Age: 37
End: 2017-09-27

## 2017-09-27 DIAGNOSIS — R10.13 EPIGASTRIC ABDOMINAL PAIN: Primary | ICD-10-CM

## 2017-09-27 NOTE — TELEPHONE ENCOUNTER
09/27/2017, 0924 - Patient telephoned per this staff member [phone].  Zero answer.  Voice message submitted with date, time, office contact information, notification Ambulatory Referral for Parul Currie Pulmonologist, submitted 09/27/2017 at 8:08 a.m., and instruction to contact office of Dr. Robinson Little M.D. at earliest convenience should patient have questions/comments.

## 2017-09-27 NOTE — TELEPHONE ENCOUNTER
----- Message from Nasima Perez sent at 9/26/2017  9:38 AM CDT -----  PT NEEDS REFERRAL PUT IN SYSTEM FOR DR LYNCH (PULMONARY) FOR CLEARANCE FOR EGD.

## 2017-09-28 ENCOUNTER — OFFICE VISIT (OUTPATIENT)
Dept: OTOLARYNGOLOGY | Facility: CLINIC | Age: 37
End: 2017-09-28

## 2017-09-28 VITALS — BODY MASS INDEX: 44.1 KG/M2 | WEIGHT: 315 LBS | HEART RATE: 64 BPM | OXYGEN SATURATION: 98 % | HEIGHT: 71 IN

## 2017-09-28 DIAGNOSIS — K06.9 GINGIVA DISORDER: ICD-10-CM

## 2017-09-28 DIAGNOSIS — H92.03 OTALGIA OF BOTH EARS: Primary | ICD-10-CM

## 2017-09-28 PROCEDURE — 99203 OFFICE O/P NEW LOW 30 MIN: CPT | Performed by: OTOLARYNGOLOGY

## 2017-09-28 RX ORDER — CHLORHEXIDINE GLUCONATE 0.12 MG/ML
RINSE ORAL
Qty: 473 ML | Refills: 5 | Status: SHIPPED | OUTPATIENT
Start: 2017-09-28 | End: 2017-11-10

## 2017-09-28 NOTE — PROGRESS NOTES
"Subjective   Yordy Hansen is a 37 y.o. male.     History of Present Illness   Patient reports for approximately a month these had bilateral ear and jaw pain.  Has been seen by Dr. Johnson in the urgent care center at [place] on at least 3 occasions and diagnosed with otitis media.  However he reports his hearing is not decreased.  He is not had any otorrhea.  He's been treated with antibiotics and steroids.  Says his gums and teeth are \"sensitive\".  Nothing in particular seems to bring this on.  He does state that Tylenol will help with the pain eventually but is having to use it quite a bit.      The following portions of the patient's history were reviewed and updated as appropriate: allergies, current medications, past family history, past medical history, past social history, past surgical history and problem list.      Yordy Hansen reports that he quit smoking about 20 years ago. His smokeless tobacco use includes Snuff. He reports that he does not drink alcohol or use illicit drugs.  Patient is not a tobacco user and has not been counseled for use of tobacco products    Family History   Problem Relation Age of Onset   • Cancer Other    • Coronary artery disease Other    • Diabetes Other    • Heart disease Other    • Hypertension Other        Allergies   Allergen Reactions   • Glipizide Other (See Comments)     Hallucinations     • Phenergan [Promethazine Hcl] Other (See Comments)     Burning veins   • Risperdal [Risperidone] Other (See Comments)     Slurred speech   • Tramadol Other (See Comments)     seizures   • Reglan [Metoclopramide] Anxiety         Current Outpatient Prescriptions:   •  albuterol (ACCUNEB) 1.25 MG/3ML nebulizer solution, Take 1 ampule by nebulization Every 4 (Four) Hours As Needed for Wheezing., Disp: , Rfl:   •  albuterol (PROVENTIL HFA;VENTOLIN HFA) 108 (90 BASE) MCG/ACT inhaler, Inhale 2 puffs Every 4 (Four) Hours As Needed for Wheezing., Disp: , Rfl:   •  amLODIPine " (NORVASC) 10 MG tablet, Take 1 tablet by mouth Every Night., Disp: 30 tablet, Rfl: 5  •  amoxicillin-clavulanate (AUGMENTIN) 875-125 MG per tablet, Take 1 tablet by mouth 2 (Two) Times a Day., Disp: 20 tablet, Rfl: 0  •  Canagliflozin (INVOKANA) 300 MG tablet, Take 300 mg by mouth Every Morning Before Breakfast for 90 days., Disp: 90 tablet, Rfl: 0  •  ciprofloxacin (CIPRO) 500 MG tablet, Take 1 tablet by mouth 2 (Two) Times a Day for 10 days., Disp: 14 tablet, Rfl: 0  •  clopidogrel (PLAVIX) 75 MG tablet, Take 1 tablet by mouth Every Night., Disp: 30 tablet, Rfl: 1  •  guaiFENesin (MUCINEX) 600 MG 12 hr tablet, Take 1 tablet by mouth 2 (Two) Times a Day., Disp: 14 tablet, Rfl: 0  •  isosorbide mononitrate (IMDUR) 120 MG 24 hr tablet, Take 1 tablet by mouth Every Morning., Disp: 30 tablet, Rfl: 5  •  lisinopril (PRINIVIL,ZESTRIL) 40 MG tablet, Take 1 tablet by mouth Every Morning., Disp: 30 tablet, Rfl: 5  •  metoprolol succinate XL (TOPROL-XL) 100 MG 24 hr tablet, Take 1 tablet by mouth Every Night., Disp: 30 tablet, Rfl: 5  •  MICROLET LANCETS misc, USE TO TEST BLOOD SUGAR EVERY DAY AND AS NEEDED, Disp: , Rfl:   •  neomycin-polymyxin-hydrocortisone (CORTISPORIN) 3.5-82756-1 otic suspension, , Disp: , Rfl:   •  nitroglycerin (NITROSTAT) 0.4 MG SL tablet, Place 1 tablet under the tongue Every 5 (Five) Minutes As Needed for Chest Pain., Disp: 25 tablet, Rfl: 6  •  ondansetron ODT (ZOFRAN-ODT) 4 MG disintegrating tablet, Take 1 tablet by mouth Every 6 (Six) Hours As Needed for Nausea or Vomiting., Disp: 12 tablet, Rfl: 0  •  oxymetazoline (AFRIN) 0.05 % nasal spray, 2 sprays into each nostril 2 (Two) Times a Day., Disp: 20 mL, Rfl: 0  •  pantoprazole (PROTONIX) 40 MG EC tablet, Take 1 tablet by mouth Every Night., Disp: 30 tablet, Rfl: 5  •  pramipexole (MIRAPEX) 1 MG tablet, Take 2 tablets by mouth Every Night. Taking 2mg daily, Disp: 30 tablet, Rfl: 5  •  pravastatin (PRAVACHOL) 80 MG tablet, Take 1 tablet by mouth  "Every Night., Disp: 30 tablet, Rfl: 5  •  ranolazine (RANEXA) 1000 MG 12 hr tablet, Take 1 tablet by mouth Every 12 (Twelve) Hours., Disp: 60 tablet, Rfl: 5  •  rivaroxaban (XARELTO) 20 MG tablet, Take 1 tablet by mouth Daily. (Patient taking differently: Take 20 mg by mouth Daily With Dinner.), Disp: 30 tablet, Rfl: 11  •  sertraline (ZOLOFT) 25 MG tablet, Take 1 tablet by mouth Daily. (Patient taking differently: Take 25 mg by mouth Every Night.), Disp: 30 tablet, Rfl: 5  •  traZODone (DESYREL) 300 MG tablet, Take 1 tablet by mouth Every Night., Disp: 30 tablet, Rfl: 5    Past Medical History:   Diagnosis Date   • Allergic rhinitis    • Asthma    • Chest pain    • CHF (congestive heart failure)    • Chronic back pain    • Coronary artery disease     2 stents.  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   Endocrine: Positive for cold intolerance.   Neurological: Positive for headaches.   All other systems reviewed and are negative.          Objective   Physical Exam  General: Morbidly obese male in no acute distress.  Alert and oriented ×3. Head: Normocephalic. Face: Symmetrical strength and appearance.  Eyes disconjugate which is apparently chronic Voice:Strong. Speech:Fluent  Ears: External ears no deformity, canals no discharge, tympanic membranes intact clear and mobile bilaterally.  Nose: Nares show no discharge mass polyp or purulence.  Boggy mucosa is present.  No gross external deformity.  Septum: Midline  Oral cavity: Lips without lesions.  Diffuse inflammation of the gingiva is noted.  Multiple caries present.  Tongue and floor of mouth without lesions.  Parotid and " submandibular ducts unobstructed.  No mucosal lesions on the buccal mucosa or vestibule of the mouth.  Pharynx: No erythema exudate mass or ulcer  Neck: No lymphadenopathy.  No thyromegaly.  Trachea and larynx midline.  No masses in the parotid or submandibular glands.  TMJs are tender to palpation bilaterally.      Assessment/Plan   Yordy was seen today for ear problem, sore throat and jaw pain.    Diagnoses and all orders for this visit:    Otalgia of both ears    Gingiva disorder      Plan: Explained to the patient that his ears are completely normal and he does not have an ear infection.  He says he is already been taking prednisone and so I don't think any additional steroid medicine is likely to be of benefit.  We will prescribe Peridex oral rinse 15 cc swish and spit twice a day to help with his inflamed gingiva, and strongly recommended he see a dentist as soon as possible.  He should utilize a soft diet.  Consider using ibuprofen or Aleve if not contraindicated by his other medical conditions.  See me again as needed.

## 2017-10-05 ENCOUNTER — HOSPITAL ENCOUNTER (INPATIENT)
Facility: HOSPITAL | Age: 37
LOS: 1 days | Discharge: HOME | End: 2017-10-10
Attending: EMERGENCY MEDICINE | Admitting: FAMILY MEDICINE

## 2017-10-05 ENCOUNTER — APPOINTMENT (OUTPATIENT)
Dept: GENERAL RADIOLOGY | Facility: HOSPITAL | Age: 37
End: 2017-10-05

## 2017-10-05 DIAGNOSIS — H66.93 BILATERAL OTITIS MEDIA, UNSPECIFIED CHRONICITY, UNSPECIFIED OTITIS MEDIA TYPE: ICD-10-CM

## 2017-10-05 DIAGNOSIS — R07.9 CHEST PAIN, UNSPECIFIED TYPE: Primary | ICD-10-CM

## 2017-10-05 LAB
ALBUMIN SERPL-MCNC: 3.6 G/DL (ref 3.4–4.8)
ALBUMIN/GLOB SERPL: 1.2 G/DL (ref 1.1–1.8)
ALP SERPL-CCNC: 67 U/L (ref 38–126)
ALT SERPL W P-5'-P-CCNC: 40 U/L (ref 21–72)
ANION GAP SERPL CALCULATED.3IONS-SCNC: 10 MMOL/L (ref 5–15)
AST SERPL-CCNC: 24 U/L (ref 17–59)
BASOPHILS # BLD AUTO: 0.02 10*3/MM3 (ref 0–0.2)
BASOPHILS NFR BLD AUTO: 0.3 % (ref 0–2)
BILIRUB SERPL-MCNC: 0.8 MG/DL (ref 0.2–1.3)
BUN BLD-MCNC: 7 MG/DL (ref 7–21)
BUN/CREAT SERPL: 10 (ref 7–25)
CALCIUM SPEC-SCNC: 8.8 MG/DL (ref 8.4–10.2)
CHLORIDE SERPL-SCNC: 100 MMOL/L (ref 95–110)
CK MB SERPL-CCNC: 1.72 NG/ML (ref 0–5)
CK SERPL-CCNC: 170 U/L (ref 55–170)
CO2 SERPL-SCNC: 28 MMOL/L (ref 22–31)
CREAT BLD-MCNC: 0.7 MG/DL (ref 0.7–1.3)
DEPRECATED RDW RBC AUTO: 44 FL (ref 35.1–43.9)
EOSINOPHIL # BLD AUTO: 0.33 10*3/MM3 (ref 0–0.7)
EOSINOPHIL NFR BLD AUTO: 4.6 % (ref 0–7)
ERYTHROCYTE [DISTWIDTH] IN BLOOD BY AUTOMATED COUNT: 14.8 % (ref 11.5–14.5)
GFR SERPL CREATININE-BSD FRML MDRD: 126 ML/MIN/1.73 (ref 70–162)
GLOBULIN UR ELPH-MCNC: 3 GM/DL (ref 2.3–3.5)
GLUCOSE BLD-MCNC: 244 MG/DL (ref 60–100)
GLUCOSE BLDC GLUCOMTR-MCNC: 218 MG/DL (ref 70–130)
GLUCOSE BLDC GLUCOMTR-MCNC: 221 MG/DL (ref 70–130)
GLUCOSE BLDC GLUCOMTR-MCNC: 231 MG/DL (ref 70–130)
HCT VFR BLD AUTO: 38.1 % (ref 39–49)
HGB BLD-MCNC: 12.6 G/DL (ref 13.7–17.3)
HOLD SPECIMEN: NORMAL
HOLD SPECIMEN: NORMAL
IMM GRANULOCYTES # BLD: 0.1 10*3/MM3 (ref 0–0.02)
IMM GRANULOCYTES NFR BLD: 1.4 % (ref 0–0.5)
LYMPHOCYTES # BLD AUTO: 1.62 10*3/MM3 (ref 0.6–4.2)
LYMPHOCYTES NFR BLD AUTO: 22.8 % (ref 10–50)
MCH RBC QN AUTO: 27.2 PG (ref 26.5–34)
MCHC RBC AUTO-ENTMCNC: 33.1 G/DL (ref 31.5–36.3)
MCV RBC AUTO: 82.1 FL (ref 80–98)
MONOCYTES # BLD AUTO: 0.61 10*3/MM3 (ref 0–0.9)
MONOCYTES NFR BLD AUTO: 8.6 % (ref 0–12)
NEUTROPHILS # BLD AUTO: 4.42 10*3/MM3 (ref 2–8.6)
NEUTROPHILS NFR BLD AUTO: 62.3 % (ref 37–80)
NT-PROBNP SERPL-MCNC: 75.7 PG/ML (ref 0–450)
PLATELET # BLD AUTO: 182 10*3/MM3 (ref 150–450)
PMV BLD AUTO: 9.5 FL (ref 8–12)
POTASSIUM BLD-SCNC: 3.6 MMOL/L (ref 3.5–5.1)
PROT SERPL-MCNC: 6.6 G/DL (ref 6.3–8.6)
RBC # BLD AUTO: 4.64 10*6/MM3 (ref 4.37–5.74)
SODIUM BLD-SCNC: 138 MMOL/L (ref 137–145)
TROPONIN I SERPL-MCNC: <0.012 NG/ML
WBC NRBC COR # BLD: 7.1 10*3/MM3 (ref 3.2–9.8)
WHOLE BLOOD HOLD SPECIMEN: NORMAL
WHOLE BLOOD HOLD SPECIMEN: NORMAL

## 2017-10-05 PROCEDURE — 71010 HC CHEST PA OR AP: CPT

## 2017-10-05 PROCEDURE — 99284 EMERGENCY DEPT VISIT MOD MDM: CPT

## 2017-10-05 PROCEDURE — 84484 ASSAY OF TROPONIN QUANT: CPT | Performed by: EMERGENCY MEDICINE

## 2017-10-05 PROCEDURE — 25010000002 MORPHINE PER 10 MG: Performed by: FAMILY MEDICINE

## 2017-10-05 PROCEDURE — 93010 ELECTROCARDIOGRAM REPORT: CPT | Performed by: INTERNAL MEDICINE

## 2017-10-05 PROCEDURE — 94760 N-INVAS EAR/PLS OXIMETRY 1: CPT

## 2017-10-05 PROCEDURE — 82553 CREATINE MB FRACTION: CPT | Performed by: FAMILY MEDICINE

## 2017-10-05 PROCEDURE — 94799 UNLISTED PULMONARY SVC/PX: CPT

## 2017-10-05 PROCEDURE — 83880 ASSAY OF NATRIURETIC PEPTIDE: CPT | Performed by: EMERGENCY MEDICINE

## 2017-10-05 PROCEDURE — 85025 COMPLETE CBC W/AUTO DIFF WBC: CPT | Performed by: EMERGENCY MEDICINE

## 2017-10-05 PROCEDURE — 63710000001 INSULIN ASPART PER 5 UNITS: Performed by: FAMILY MEDICINE

## 2017-10-05 PROCEDURE — 82550 ASSAY OF CK (CPK): CPT | Performed by: FAMILY MEDICINE

## 2017-10-05 PROCEDURE — 80053 COMPREHEN METABOLIC PANEL: CPT | Performed by: EMERGENCY MEDICINE

## 2017-10-05 PROCEDURE — G0378 HOSPITAL OBSERVATION PER HR: HCPCS

## 2017-10-05 PROCEDURE — 93005 ELECTROCARDIOGRAM TRACING: CPT

## 2017-10-05 PROCEDURE — 84484 ASSAY OF TROPONIN QUANT: CPT | Performed by: FAMILY MEDICINE

## 2017-10-05 PROCEDURE — 82962 GLUCOSE BLOOD TEST: CPT

## 2017-10-05 RX ORDER — RANOLAZINE 500 MG/1
1000 TABLET, EXTENDED RELEASE ORAL EVERY 12 HOURS SCHEDULED
Status: DISCONTINUED | OUTPATIENT
Start: 2017-10-05 | End: 2017-10-10 | Stop reason: HOSPADM

## 2017-10-05 RX ORDER — METOPROLOL SUCCINATE 100 MG/1
100 TABLET, EXTENDED RELEASE ORAL NIGHTLY
Status: DISCONTINUED | OUTPATIENT
Start: 2017-10-05 | End: 2017-10-10 | Stop reason: HOSPADM

## 2017-10-05 RX ORDER — AMLODIPINE BESYLATE 10 MG/1
10 TABLET ORAL NIGHTLY
Status: DISCONTINUED | OUTPATIENT
Start: 2017-10-05 | End: 2017-10-10 | Stop reason: HOSPADM

## 2017-10-05 RX ORDER — ACETAMINOPHEN 325 MG/1
650 TABLET ORAL EVERY 4 HOURS PRN
Status: DISCONTINUED | OUTPATIENT
Start: 2017-10-05 | End: 2017-10-10 | Stop reason: HOSPADM

## 2017-10-05 RX ORDER — MORPHINE SULFATE 10 MG/ML
2 INJECTION INTRAMUSCULAR; INTRAVENOUS; SUBCUTANEOUS EVERY 4 HOURS PRN
Status: DISCONTINUED | OUTPATIENT
Start: 2017-10-05 | End: 2017-10-09 | Stop reason: CLARIF

## 2017-10-05 RX ORDER — ISOSORBIDE MONONITRATE 60 MG/1
120 TABLET, EXTENDED RELEASE ORAL EVERY MORNING
Status: DISCONTINUED | OUTPATIENT
Start: 2017-10-05 | End: 2017-10-10 | Stop reason: HOSPADM

## 2017-10-05 RX ORDER — ONDANSETRON 2 MG/ML
4 INJECTION INTRAMUSCULAR; INTRAVENOUS EVERY 6 HOURS PRN
Status: DISCONTINUED | OUTPATIENT
Start: 2017-10-05 | End: 2017-10-10 | Stop reason: HOSPADM

## 2017-10-05 RX ORDER — SODIUM CHLORIDE 0.9 % (FLUSH) 0.9 %
1-10 SYRINGE (ML) INJECTION AS NEEDED
Status: DISCONTINUED | OUTPATIENT
Start: 2017-10-05 | End: 2017-10-10 | Stop reason: HOSPADM

## 2017-10-05 RX ORDER — ATORVASTATIN CALCIUM 20 MG/1
20 TABLET, FILM COATED ORAL NIGHTLY
Status: DISCONTINUED | OUTPATIENT
Start: 2017-10-05 | End: 2017-10-10 | Stop reason: HOSPADM

## 2017-10-05 RX ORDER — SODIUM CHLORIDE 0.9 % (FLUSH) 0.9 %
10 SYRINGE (ML) INJECTION AS NEEDED
Status: DISCONTINUED | OUTPATIENT
Start: 2017-10-05 | End: 2017-10-10 | Stop reason: HOSPADM

## 2017-10-05 RX ORDER — DEXTROSE MONOHYDRATE 25 G/50ML
25 INJECTION, SOLUTION INTRAVENOUS
Status: DISCONTINUED | OUTPATIENT
Start: 2017-10-05 | End: 2017-10-10 | Stop reason: HOSPADM

## 2017-10-05 RX ORDER — TRAZODONE HYDROCHLORIDE 150 MG/1
300 TABLET ORAL NIGHTLY
Status: DISCONTINUED | OUTPATIENT
Start: 2017-10-05 | End: 2017-10-10 | Stop reason: HOSPADM

## 2017-10-05 RX ORDER — PANTOPRAZOLE SODIUM 40 MG/1
40 TABLET, DELAYED RELEASE ORAL NIGHTLY
Status: DISCONTINUED | OUTPATIENT
Start: 2017-10-05 | End: 2017-10-10 | Stop reason: HOSPADM

## 2017-10-05 RX ORDER — CLOPIDOGREL BISULFATE 75 MG/1
75 TABLET ORAL NIGHTLY
Status: DISCONTINUED | OUTPATIENT
Start: 2017-10-05 | End: 2017-10-10 | Stop reason: HOSPADM

## 2017-10-05 RX ORDER — DOCUSATE SODIUM 100 MG/1
100 CAPSULE, LIQUID FILLED ORAL 2 TIMES DAILY
Status: DISCONTINUED | OUTPATIENT
Start: 2017-10-05 | End: 2017-10-10 | Stop reason: HOSPADM

## 2017-10-05 RX ORDER — CLOPIDOGREL BISULFATE 75 MG/1
75 TABLET ORAL ONCE
Status: COMPLETED | OUTPATIENT
Start: 2017-10-05 | End: 2017-10-05

## 2017-10-05 RX ORDER — ASPIRIN 325 MG
325 TABLET ORAL ONCE
Status: COMPLETED | OUTPATIENT
Start: 2017-10-05 | End: 2017-10-05

## 2017-10-05 RX ORDER — NITROGLYCERIN 0.4 MG/1
0.4 TABLET SUBLINGUAL
Status: DISCONTINUED | OUTPATIENT
Start: 2017-10-05 | End: 2017-10-10 | Stop reason: HOSPADM

## 2017-10-05 RX ORDER — LISINOPRIL 40 MG/1
40 TABLET ORAL EVERY MORNING
Status: DISCONTINUED | OUTPATIENT
Start: 2017-10-05 | End: 2017-10-10 | Stop reason: HOSPADM

## 2017-10-05 RX ORDER — ONDANSETRON 4 MG/1
4 TABLET, ORALLY DISINTEGRATING ORAL EVERY 6 HOURS PRN
Status: DISCONTINUED | OUTPATIENT
Start: 2017-10-05 | End: 2017-10-10 | Stop reason: HOSPADM

## 2017-10-05 RX ORDER — NICOTINE POLACRILEX 4 MG
15 LOZENGE BUCCAL
Status: DISCONTINUED | OUTPATIENT
Start: 2017-10-05 | End: 2017-10-10 | Stop reason: HOSPADM

## 2017-10-05 RX ORDER — PRAMIPEXOLE DIHYDROCHLORIDE 1 MG/1
2 TABLET ORAL NIGHTLY
Status: DISCONTINUED | OUTPATIENT
Start: 2017-10-05 | End: 2017-10-10 | Stop reason: HOSPADM

## 2017-10-05 RX ORDER — SERTRALINE HYDROCHLORIDE 25 MG/1
25 TABLET, FILM COATED ORAL DAILY
Status: DISCONTINUED | OUTPATIENT
Start: 2017-10-05 | End: 2017-10-10 | Stop reason: HOSPADM

## 2017-10-05 RX ADMIN — TRAZODONE HYDROCHLORIDE 300 MG: 150 TABLET ORAL at 21:21

## 2017-10-05 RX ADMIN — CLOPIDOGREL BISULFATE 75 MG: 75 TABLET ORAL at 07:28

## 2017-10-05 RX ADMIN — INSULIN ASPART 4 UNITS: 100 INJECTION, SOLUTION INTRAVENOUS; SUBCUTANEOUS at 13:32

## 2017-10-05 RX ADMIN — RIVAROXABAN 20 MG: 10 TABLET, FILM COATED ORAL at 13:30

## 2017-10-05 RX ADMIN — METFORMIN HYDROCHLORIDE 1000 MG: 500 TABLET ORAL at 17:10

## 2017-10-05 RX ADMIN — ISOSORBIDE MONONITRATE 120 MG: 60 TABLET, EXTENDED RELEASE ORAL at 13:30

## 2017-10-05 RX ADMIN — RANOLAZINE 1000 MG: 500 TABLET, FILM COATED, EXTENDED RELEASE ORAL at 21:00

## 2017-10-05 RX ADMIN — ATORVASTATIN CALCIUM 20 MG: 20 TABLET, FILM COATED ORAL at 21:21

## 2017-10-05 RX ADMIN — AMLODIPINE BESYLATE 10 MG: 10 TABLET ORAL at 21:22

## 2017-10-05 RX ADMIN — LISINOPRIL 40 MG: 40 TABLET ORAL at 13:30

## 2017-10-05 RX ADMIN — PANTOPRAZOLE SODIUM 40 MG: 40 TABLET, DELAYED RELEASE ORAL at 21:22

## 2017-10-05 RX ADMIN — ASPIRIN 325 MG: 325 TABLET, COATED ORAL at 05:35

## 2017-10-05 RX ADMIN — PRAMIPEXOLE DIHYDROCHLORIDE 2 MG: 1 TABLET ORAL at 21:21

## 2017-10-05 RX ADMIN — INSULIN ASPART 4 UNITS: 100 INJECTION, SOLUTION INTRAVENOUS; SUBCUTANEOUS at 17:10

## 2017-10-05 RX ADMIN — SERTRALINE HYDROCHLORIDE 25 MG: 25 TABLET ORAL at 13:30

## 2017-10-05 RX ADMIN — MORPHINE SULFATE 2 MG: 10 INJECTION INTRAVENOUS at 13:34

## 2017-10-05 RX ADMIN — INSULIN ASPART 4 UNITS: 100 INJECTION, SOLUTION INTRAVENOUS; SUBCUTANEOUS at 21:22

## 2017-10-05 RX ADMIN — CLOPIDOGREL BISULFATE 75 MG: 75 TABLET ORAL at 21:22

## 2017-10-05 RX ADMIN — METOPROLOL SUCCINATE 100 MG: 100 TABLET, EXTENDED RELEASE ORAL at 21:22

## 2017-10-05 RX ADMIN — NITROGLYCERIN 1 INCH: 20 OINTMENT TOPICAL at 07:29

## 2017-10-05 RX ADMIN — RANOLAZINE 1000 MG: 500 TABLET, FILM COATED, EXTENDED RELEASE ORAL at 13:30

## 2017-10-05 RX ADMIN — MORPHINE SULFATE 2 MG: 10 INJECTION INTRAVENOUS at 18:25

## 2017-10-05 NOTE — ED NOTES
Telemetry monitor placed on patient, monitor tech confirmed.      Garima Santillan  10/05/17 100

## 2017-10-05 NOTE — PLAN OF CARE
Problem: Patient Care Overview (Adult)  Goal: Plan of Care Review  Outcome: Ongoing (interventions implemented as appropriate)    10/05/17 1603   Coping/Psychosocial Response Interventions   Plan Of Care Reviewed With patient   Patient Care Overview   Progress no change   Outcome Evaluation   Outcome Summary/Follow up Plan new admission

## 2017-10-05 NOTE — ED NOTES
Pt rounded on by floor nurse (GARY Feliz)- unable to chart in computer. Rounds made at this time.     Ligia Oliver RN  10/05/17 7636

## 2017-10-05 NOTE — ED PROVIDER NOTES
"Subjective   HPI Comments: CP, known disease, x 4 hours with SOB.  No diaphroesis, no f/c.  No n/v.  No weakness.  No neurologic symptoms.      Patient is a 37 y.o. male presenting with chest pain.   History provided by:  Patient   used: No    Chest Pain   Pain location:  Substernal area  Associated symptoms: shortness of breath    Associated symptoms: no abdominal pain, no back pain, no cough, no dizziness, no fever, no headache, no nausea, no numbness, no palpitations, no vomiting and no weakness        Review of Systems   Constitutional: Negative.  Negative for appetite change, chills and fever.   HENT: Negative.  Negative for congestion.    Eyes: Negative.  Negative for photophobia and visual disturbance.   Respiratory: Positive for chest tightness and shortness of breath. Negative for cough.    Cardiovascular: Positive for chest pain. Negative for palpitations.   Gastrointestinal: Negative.  Negative for abdominal pain, constipation, diarrhea, nausea and vomiting.   Endocrine: Negative.    Genitourinary: Negative.  Negative for decreased urine volume, dysuria, flank pain and hematuria.   Musculoskeletal: Negative.  Negative for arthralgias, back pain, myalgias, neck pain and neck stiffness.   Skin: Negative.  Negative for pallor.   Neurological: Negative.  Negative for dizziness, syncope, weakness, light-headedness, numbness and headaches.   Psychiatric/Behavioral: Negative.  Negative for confusion and suicidal ideas. The patient is not nervous/anxious.        Past Medical History:   Diagnosis Date   • Allergic rhinitis    • Asthma    • Chest pain    • CHF (congestive heart failure)    • Chronic back pain    • Coronary artery disease     2 stents.  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\"   • GERD (gastroesophageal reflux disease)    • Hyperlipidemia    • Hypertension    • Low back pain    • Morbid obesity    • Pulmonary " embolism    • RLS (restless legs syndrome)    • Seizures    • Sleep apnea        Allergies   Allergen Reactions   • Glipizide Other (See Comments)     Hallucinations     • Phenergan [Promethazine Hcl] Other (See Comments)     Burning veins   • Risperdal [Risperidone] Other (See Comments)     Slurred speech   • Tramadol Other (See Comments)     seizures   • Reglan [Metoclopramide] Anxiety       Past Surgical History:   Procedure Laterality Date   • ADENOIDECTOMY     • CARDIAC CATHETERIZATION N/A 3/15/2017    Procedure: Left Heart Cath;  Surgeon: Edwige Briceno MD;  Location: [facility] CATH INVASIVE LOCATION;  Service:    • CARDIAC CATHETERIZATION N/A 3/15/2017    Procedure: Stent SOPHIA coronary;  Surgeon: Edwige Briceno MD;  Location: [facility] CATH INVASIVE LOCATION;  Service:    • CHOLECYSTECTOMY     • CORONARY ANGIOPLASTY WITH STENT PLACEMENT     • MN RT/LT HEART CATHETERS N/A 3/15/2017    Procedure: Percutaneous Coronary Intervention;  Surgeon: Edwige Briceno MD;  Location: [facility] CATH INVASIVE LOCATION;  Service: Cardiovascular   • TONSILLECTOMY     • TRANSESOPHAGEAL ECHOCARDIOGRAM ([place])         Family History   Problem Relation Age of Onset   • Cancer Other    • Coronary artery disease Other    • Diabetes Other    • Heart disease Other    • Hypertension Other        Social History     Social History   • Marital status:      Spouse name: Cori   • Number of children: 0   • Years of education: N/A     Occupational History   • Disabled      Social History Main Topics   • Smoking status: Former Smoker     Quit date: 1997   • Smokeless tobacco: Former User     Types: Snuff     Quit date: 2017   • Alcohol use No   • Drug use: No   • Sexual activity: Defer     Other Topics Concern   • None     Social History Narrative           Objective   Physical Exam   Constitutional: He is oriented to person, place, and time. He appears well-developed and well-nourished. No distress.   Obese, deconditioned   HENT:   Head: Normocephalic  and atraumatic.   Eyes: Conjunctivae and EOM are normal.   Neck: Normal range of motion. Neck supple. No JVD present.   Cardiovascular: Normal rate, regular rhythm, normal heart sounds and intact distal pulses.  Exam reveals no gallop and no friction rub.    No murmur heard.  Pulmonary/Chest: Effort normal. No respiratory distress. He has no wheezes. He has no rales. He exhibits no tenderness.   Mild tachypnea and increased WOB   Abdominal: Soft. Bowel sounds are normal. He exhibits no distension and no mass. There is no tenderness. There is no rebound and no guarding.   Musculoskeletal: Normal range of motion. He exhibits edema (2+ nonpitting).   Neurological: He is alert and oriented to person, place, and time.   Skin: Skin is warm and dry.   Psychiatric: He has a normal mood and affect. His behavior is normal. Judgment and thought content normal.   Nursing note and vitals reviewed.      ECG 12 Lead    Date/Time: 10/5/2017 5:22 AM  Performed by: RICARDA SHEETS  Authorized by: RICARDA SHEETS   Interpreted by physician  Rhythm: sinus rhythm  Clinical impression: normal ECG               ED Course  ED Course      Labs Reviewed   COMPREHENSIVE METABOLIC PANEL - Abnormal; Notable for the following:        Result Value    Glucose 244 (*)     All other components within normal limits   CBC WITH AUTO DIFFERENTIAL - Abnormal; Notable for the following:     Hemoglobin 12.6 (*)     Hematocrit 38.1 (*)     RDW 14.8 (*)     RDW-SD 44.0 (*)     Immature Grans % 1.4 (*)     Immature Grans, Absolute 0.10 (*)     All other components within normal limits   TROPONIN (IN-HOUSE) - Normal   BNP (IN-HOUSE) - Normal   RAINBOW DRAW    Narrative:     The following orders were created for panel order [place] Draw.  Procedure                               Abnormality         Status                     ---------                               -----------         ------                     Light Blue Top[[id number]]                                    Final result               Green Top (Gel)[[id number]]                                  Final result               Lavender Top[[id number]]                                     Final result               Gold Top - SST[[id number]]                                   Final result                 Please view results for these tests on the individual orders.   TROPONIN (IN-HOUSE)   CBC AND DIFFERENTIAL    Narrative:     The following orders were created for panel order CBC & Differential.  Procedure                               Abnormality         Status                     ---------                               -----------         ------                     CBC Auto Differential[[id number]]        Abnormal            Final result                 Please view results for these tests on the individual orders.   LIGHT BLUE TOP   GREEN TOP   LAVENDER TOP   GOLD TOP - SST       XR Chest 1 View   Final Result   No acute cardiopulmonary abnormality.      Electronically signed by:  Brennan Corrigan MD  10/5/2017 5:51 AM   CDT Workstation: [id number]        CP/obs.        HEART Score  History: Moderately suspicious (+1)  ECG: Normal (+0)  Age: 45 through 65 (+1)  Risk Factors: 3 or more risk factors OR history of atherosclerotic disease (+2)  Troponin: Normal limit or lower (+0)  Total: 4         MDM    Final diagnoses:   Chest pain, unspecified type            Asim Kumar MD  10/05/17 0937

## 2017-10-05 NOTE — H&P
"      [facility] Medicine Admission      Date of Admission: 10/5/2017      Primary Care Physician: LALA Barajas      Chief Complaint: Chest pain     HPI: Patient presents to ED with complaints of pressure like chest pain that started earlier today. He states that he feel as if someone is \"sitting on his chest\". Started earlier this morning, and lasted for about 30-40 minutes. Stated symptoms associated with SOA and sweating. No palpitations, dizziness, near syncope. No radiation of pain. No alleviating/exacerbating factors.     Past Medical History:  has a past medical history of Allergic rhinitis; Asthma; Chest pain; CHF (congestive heart failure); Chronic back pain; Coronary artery disease; Diabetes mellitus; Factor II deficiency; GERD (gastroesophageal reflux disease); Hyperlipidemia; Hypertension; Low back pain; Morbid obesity; Pulmonary embolism; RLS (restless legs syndrome); Seizures; and Sleep apnea.    Past Surgical History:  has a past surgical history that includes transesophageal echocardiogram ([place]); rt/lt heart catheters (N/A, 3/15/2017); Cardiac catheterization (N/A, 3/15/2017); Cardiac catheterization (N/A, 3/15/2017); Adenoidectomy; Cholecystectomy; Tonsillectomy; and Coronary angioplasty with stent.    Family History: family history includes Cancer in his other; Coronary artery disease in his other; Diabetes in his other; Heart disease in his other; Hypertension in his other.    Social History:  reports that he quit smoking about 20 years ago. He quit smokeless tobacco use about 9 months ago. His smokeless tobacco use included Snuff. He reports that he does not drink alcohol or use illicit drugs.    Allergies:   Allergies   Allergen Reactions   • Glipizide Other (See Comments)     Hallucinations     • Phenergan [Promethazine Hcl] Other (See Comments)     Burning veins   • Risperdal [Risperidone] Other (See Comments)     Slurred speech   • Tramadol Other " (See Comments)     seizures   • Reglan [Metoclopramide] Anxiety       Medications:   Prior to Admission medications    Medication Sig Start Date End Date Taking? Authorizing Provider   amLODIPine (NORVASC) 10 MG tablet Take 1 tablet by mouth Every Night. 6/1/17  Yes LALA Barajas   Canagliflozin (INVOKANA) 300 MG tablet Take 300 mg by mouth Every Morning Before Breakfast for 90 days. 7/17/17 10/15/17 Yes LALA Barajas   chlorhexidine (PERIDEX) 0.12 % solution 15 cc swish and spit twice a day 9/28/17  Yes Earnest Moeller MD   clopidogrel (PLAVIX) 75 MG tablet Take 1 tablet by mouth Every Night. 9/22/17  Yes HENNA Rodriguez   isosorbide mononitrate (IMDUR) 120 MG 24 hr tablet Take 1 tablet by mouth Every Morning. 6/1/17  Yes LALA Barajas   lisinopril (PRINIVIL,ZESTRIL) 40 MG tablet Take 1 tablet by mouth Every Morning. 6/1/17  Yes LALA Barajas   metFORMIN (GLUCOPHAGE) 1000 MG tablet Take 1,000 mg by mouth 2 (Two) Times a Day With Meals.   Yes Historical Provider, MD   metoprolol succinate XL (TOPROL-XL) 100 MG 24 hr tablet Take 1 tablet by mouth Every Night. 6/1/17  Yes LALA Barajas   pantoprazole (PROTONIX) 40 MG EC tablet Take 1 tablet by mouth Every Night. 6/1/17  Yes LALA Barajas   pramipexole (MIRAPEX) 1 MG tablet Take 2 tablets by mouth Every Night. Taking 2mg daily 6/1/17  Yes LALA Barajas   pravastatin (PRAVACHOL) 80 MG tablet Take 1 tablet by mouth Every Night. 6/1/17  Yes LALA Barajas   ranolazine (RANEXA) 1000 MG 12 hr tablet Take 1 tablet by mouth Every 12 (Twelve) Hours. 6/1/17  Yes LALA Barajas   rivaroxaban (XARELTO) 20 MG tablet Take 1 tablet by mouth Daily.  Patient taking differently: Take 20 mg by mouth Daily With Dinner. 5/30/17  Yes LALA Winters   sertraline (ZOLOFT) 25 MG tablet Take 1 tablet by mouth Daily.  Patient taking differently: Take 25 mg by mouth Every Night. 6/1/17  Yes LALA Barajas   traZODonsilvina  (DESYREL) 300 MG tablet Take 1 tablet by mouth Every Night. 6/1/17  Yes LALA Barajas   albuterol (ACCUNEB) 1.25 MG/3ML nebulizer solution Take 1 ampule by nebulization Every 4 (Four) Hours As Needed for Wheezing.    Nurse [facility], RN   albuterol (PROVENTIL HFA;VENTOLIN HFA) 108 (90 BASE) MCG/ACT inhaler Inhale 2 puffs Every 4 (Four) Hours As Needed for Wheezing.    Historical Provider, MD   amoxicillin-clavulanate (AUGMENTIN) 875-125 MG per tablet Take 1 tablet by mouth 2 (Two) Times a Day. 9/18/17   Alphonso Johnson MD   ciprofloxacin (CIPRO) 500 MG tablet Take 1 tablet by mouth 2 (Two) Times a Day for 10 days. 9/25/17 10/5/17  Alphonso Johnson MD   guaiFENesin (MUCINEX) 600 MG 12 hr tablet Take 1 tablet by mouth 2 (Two) Times a Day. 9/13/17   Alphonso Johnson MD   MICROLET LANCETS misc USE TO TEST BLOOD SUGAR EVERY DAY AND AS NEEDED 6/20/16   Historical Provider, MD   neomycin-polymyxin-hydrocortisone (CORTISPORIN) 3.5-96591-1 otic suspension  9/17/17   Historical Provider, MD   nitroglycerin (NITROSTAT) 0.4 MG SL tablet Place 1 tablet under the tongue Every 5 (Five) Minutes As Needed for Chest Pain. 6/1/17   LALA Barajas   ondansetron ODT (ZOFRAN-ODT) 4 MG disintegrating tablet Take 1 tablet by mouth Every 6 (Six) Hours As Needed for Nausea or Vomiting. 3/24/17   Ifeoma Goel PA-C   oxymetazoline (AFRIN) 0.05 % nasal spray 2 sprays into each nostril 2 (Two) Times a Day. 9/18/17   Alphonso Johnson MD       Review of Systems:  Review of Systems   Constitutional: Negative for appetite change, chills and fever.   HENT: Negative for congestion, ear pain, rhinorrhea, sneezing and sore throat.    Respiratory: Positive for shortness of breath. Negative for cough.    Cardiovascular: Positive for chest pain. Negative for palpitations and leg swelling.   Gastrointestinal: Negative for diarrhea, nausea and vomiting.   Endocrine: Negative for polyphagia and polyuria.    Musculoskeletal: Negative for back pain and neck pain.   Skin: Negative for color change and rash.   Neurological: Negative for dizziness, syncope and weakness.   Psychiatric/Behavioral: Negative for agitation, confusion and dysphoric mood.      Otherwise complete ROS is negative except as mentioned above.    Physical Exam:   Temp:  [96.3 °F (35.7 °C)-98.2 °F (36.8 °C)] 97.4 °F (36.3 °C)  Heart Rate:  [62-70] 66  Resp:  [18-24] 18  BP: (125-185)/(60-81) 125/60  Physical Exam   Constitutional: He is oriented to person, place, and time. He appears well-developed and well-nourished.   Cardiovascular: Normal rate, regular rhythm and normal heart sounds.  Exam reveals no gallop and no friction rub.    No murmur heard.  Pulmonary/Chest: Effort normal and breath sounds normal. No respiratory distress. He has no wheezes. He has no rales.   Abdominal: Soft. Bowel sounds are normal. There is no tenderness.   Musculoskeletal: Normal range of motion. He exhibits no edema.   Neurological: He is alert and oriented to person, place, and time.   Skin: Skin is warm and dry.   Psychiatric: He has a normal mood and affect. His behavior is normal.   Vitals reviewed.        Results Reviewed:  I have personally reviewed current lab, radiology, and data and agree with results.  Lab Results (last 24 hours)     Procedure Component Value Units Date/Time    CBC & Differential [[id number]] Collected:  10/05/17 0533    Specimen:  Blood Updated:  10/05/17 0624    Narrative:       The following orders were created for panel order CBC & Differential.  Procedure                               Abnormality         Status                     ---------                               -----------         ------                     CBC Auto Differential[[id number]]        Abnormal            Final result                 Please view results for these tests on the individual orders.    CBC Auto Differential [[id number]]  (Abnormal) Collected:  10/05/17 0533     Specimen:  Blood Updated:  10/05/17 0624     WBC 7.10 10*3/mm3      RBC 4.64 10*6/mm3      Hemoglobin 12.6 (L) g/dL      Hematocrit 38.1 (L) %      MCV 82.1 fL      MCH 27.2 pg      MCHC 33.1 g/dL      RDW 14.8 (H) %      RDW-SD 44.0 (H) fl      MPV 9.5 fL      Platelets 182 10*3/mm3      Neutrophil % 62.3 %      Lymphocyte % 22.8 %      Monocyte % 8.6 %      Eosinophil % 4.6 %      Basophil % 0.3 %      Immature Grans % 1.4 (H) %      Neutrophils, Absolute 4.42 10*3/mm3      Lymphocytes, Absolute 1.62 10*3/mm3      Monocytes, Absolute 0.61 10*3/mm3      Eosinophils, Absolute 0.33 10*3/mm3      Basophils, Absolute 0.02 10*3/mm3      Immature Grans, Absolute 0.10 (H) 10*3/mm3     Troponin [[id number]]  (Normal) Collected:  10/05/17 0533    Specimen:  Blood Updated:  10/05/17 0643     Troponin I <0.012 ng/mL     BNP [[id number]]  (Normal) Collected:  10/05/17 0533    Specimen:  Blood Updated:  10/05/17 0644     proBNP 75.7 pg/mL     Comprehensive Metabolic Panel [[id number]]  (Abnormal) Collected:  10/05/17 0533    Specimen:  Blood Updated:  10/05/17 0644     Glucose 244 (H) mg/dL      BUN 7 mg/dL      Creatinine 0.70 mg/dL      Sodium 138 mmol/L      Potassium 3.6 mmol/L      Chloride 100 mmol/L      CO2 28.0 mmol/L      Calcium 8.8 mg/dL      Total Protein 6.6 g/dL      Albumin 3.60 g/dL      ALT (SGPT) 40 U/L      AST (SGOT) 24 U/L      Alkaline Phosphatase 67 U/L      Total Bilirubin 0.8 mg/dL      eGFR Non African Amer 126 mL/min/1.73      Globulin 3.0 gm/dL      A/G Ratio 1.2 g/dL      BUN/Creatinine Ratio 10.0     Anion Gap 10.0 mmol/L     [place] Draw [[id number]] Collected:  10/05/17 0533    Specimen:  Blood Updated:  10/05/17 0646    Narrative:       The following orders were created for panel order [place] Draw.  Procedure                               Abnormality         Status                     ---------                               -----------         ------                     Light Blue Top[[id number]]                                    Final result               Green Top (Gel)[[id number]]                                  Final result               Lavender Top[[id number]]                                     Final result               Gold Top - SST[[id number]]                                   Final result                 Please view results for these tests on the individual orders.    Light Blue Top [[id number]] Collected:  10/05/17 0533    Specimen:  Blood Updated:  10/05/17 0646     Extra Tube hold for add-on      Auto resulted       Green Top (Gel) [[id number]] Collected:  10/05/17 0533    Specimen:  Blood Updated:  10/05/17 0646     Extra Tube Hold for add-ons.      Auto resulted.       Lavender Top [[id number]] Collected:  10/05/17 0533    Specimen:  Blood Updated:  10/05/17 0646     Extra Tube hold for add-on      Auto resulted       Gold Top - SST [[id number]] Collected:  10/05/17 0533    Specimen:  Blood Updated:  10/05/17 0646     Extra Tube Hold for add-ons.      Auto resulted.       Troponin [[id number]]  (Normal) Collected:  10/05/17 0952    Specimen:  Blood Updated:  10/05/17 1029     Troponin I <0.012 ng/mL     CK [[id number]]  (Normal) Collected:  10/05/17 0952    Specimen:  Blood Updated:  10/05/17 1205     Creatine Kinase 170 U/L     POC Glucose Fingerstick [[id number]]  (Abnormal) Collected:  10/05/17 1109    Specimen:  Blood Updated:  10/05/17 1211     Glucose 218 (H) mg/dL       RN NotifiedMeter: [id number]: [id number] CAROLYNE GARNER       CK-MB [[id number]]  (Normal) Collected:  10/05/17 0952    Specimen:  Blood Updated:  10/05/17 1214     CKMB 1.72 ng/mL         Imaging Results (last 24 hours)     Procedure Component Value Units Date/Time    XR Chest 1 View [[id number]] Collected:  10/05/17 0531     Updated:  10/05/17 0552    Narrative:       Exam: AP portable chest    INDICATION: Chest pain    COMPARISON: 9/21/2017    FINDINGS: AP portable chest. Bony structures are intact. Mild  cardiac  enlargement. Lungs are clear. No pneumothorax or pleural  effusion.      Impression:       No acute cardiopulmonary abnormality.    Electronically signed by:  Brennan Corrigan MD  10/5/2017 5:51 AM  CDT Workstation: [employer]                          Assessment/Plan:   Hospital Problem List     Chest pain        1) Chest pain rule out ACS - Trend 3 sets of cardiac enzymes, monitor on telemetry. ASA, Plavix, Nitro, Morphine PRN  2) Type II diabetes mellitus - HOme medications, insulin sliding scale  3) History of PE - Restart Xarelto         Wai Eliot Fischer MD  10/05/17  3:43 PM

## 2017-10-06 ENCOUNTER — APPOINTMENT (OUTPATIENT)
Dept: CT IMAGING | Facility: HOSPITAL | Age: 37
End: 2017-10-06

## 2017-10-06 LAB
ANION GAP SERPL CALCULATED.3IONS-SCNC: 10 MMOL/L (ref 5–15)
BASOPHILS # BLD AUTO: 0.02 10*3/MM3 (ref 0–0.2)
BASOPHILS NFR BLD AUTO: 0.2 % (ref 0–2)
BUN BLD-MCNC: 9 MG/DL (ref 7–21)
BUN/CREAT SERPL: 12.9 (ref 7–25)
CALCIUM SPEC-SCNC: 8.8 MG/DL (ref 8.4–10.2)
CHLORIDE SERPL-SCNC: 101 MMOL/L (ref 95–110)
CO2 SERPL-SCNC: 28 MMOL/L (ref 22–31)
CREAT BLD-MCNC: 0.7 MG/DL (ref 0.7–1.3)
DEPRECATED RDW RBC AUTO: 45.5 FL (ref 35.1–43.9)
EOSINOPHIL # BLD AUTO: 0.37 10*3/MM3 (ref 0–0.7)
EOSINOPHIL NFR BLD AUTO: 4.3 % (ref 0–7)
ERYTHROCYTE [DISTWIDTH] IN BLOOD BY AUTOMATED COUNT: 15.3 % (ref 11.5–14.5)
GFR SERPL CREATININE-BSD FRML MDRD: 126 ML/MIN/1.73 (ref 70–162)
GLUCOSE BLD-MCNC: 179 MG/DL (ref 60–100)
GLUCOSE BLDC GLUCOMTR-MCNC: 145 MG/DL (ref 70–130)
GLUCOSE BLDC GLUCOMTR-MCNC: 180 MG/DL (ref 70–130)
GLUCOSE BLDC GLUCOMTR-MCNC: 186 MG/DL (ref 70–130)
GLUCOSE BLDC GLUCOMTR-MCNC: 190 MG/DL (ref 70–130)
HCT VFR BLD AUTO: 38.9 % (ref 39–49)
HGB BLD-MCNC: 13.2 G/DL (ref 13.7–17.3)
IMM GRANULOCYTES # BLD: 0.13 10*3/MM3 (ref 0–0.02)
IMM GRANULOCYTES NFR BLD: 1.5 % (ref 0–0.5)
LYMPHOCYTES # BLD AUTO: 1.38 10*3/MM3 (ref 0.6–4.2)
LYMPHOCYTES NFR BLD AUTO: 16.2 % (ref 10–50)
MCH RBC QN AUTO: 27.7 PG (ref 26.5–34)
MCHC RBC AUTO-ENTMCNC: 33.9 G/DL (ref 31.5–36.3)
MCV RBC AUTO: 81.6 FL (ref 80–98)
MONOCYTES # BLD AUTO: 0.85 10*3/MM3 (ref 0–0.9)
MONOCYTES NFR BLD AUTO: 10 % (ref 0–12)
NEUTROPHILS # BLD AUTO: 5.76 10*3/MM3 (ref 2–8.6)
NEUTROPHILS NFR BLD AUTO: 67.8 % (ref 37–80)
NRBC BLD MANUAL-RTO: 0 /100 WBC (ref 0–0)
PLATELET # BLD AUTO: 181 10*3/MM3 (ref 150–450)
PMV BLD AUTO: 8.5 FL (ref 8–12)
POTASSIUM BLD-SCNC: 4.3 MMOL/L (ref 3.5–5.1)
RBC # BLD AUTO: 4.77 10*6/MM3 (ref 4.37–5.74)
SODIUM BLD-SCNC: 139 MMOL/L (ref 137–145)
WBC NRBC COR # BLD: 8.51 10*3/MM3 (ref 3.2–9.8)

## 2017-10-06 PROCEDURE — G0378 HOSPITAL OBSERVATION PER HR: HCPCS

## 2017-10-06 PROCEDURE — 85025 COMPLETE CBC W/AUTO DIFF WBC: CPT | Performed by: FAMILY MEDICINE

## 2017-10-06 PROCEDURE — 94760 N-INVAS EAR/PLS OXIMETRY 1: CPT

## 2017-10-06 PROCEDURE — 0 IOPAMIDOL PER 1 ML: Performed by: HOSPITALIST

## 2017-10-06 PROCEDURE — 94799 UNLISTED PULMONARY SVC/PX: CPT

## 2017-10-06 PROCEDURE — 82962 GLUCOSE BLOOD TEST: CPT

## 2017-10-06 PROCEDURE — B2011ZZ PLAIN RADIOGRAPHY OF MULTIPLE CORONARY ARTERIES USING LOW OSMOLAR CONTRAST: ICD-10-PCS | Performed by: RADIOLOGY

## 2017-10-06 PROCEDURE — 75574 CT ANGIO HRT W/3D IMAGE: CPT

## 2017-10-06 PROCEDURE — 80048 BASIC METABOLIC PNL TOTAL CA: CPT | Performed by: FAMILY MEDICINE

## 2017-10-06 PROCEDURE — 25010000002 MORPHINE PER 10 MG: Performed by: FAMILY MEDICINE

## 2017-10-06 PROCEDURE — 63710000001 INSULIN ASPART PER 5 UNITS: Performed by: FAMILY MEDICINE

## 2017-10-06 PROCEDURE — 25010000002 ONDANSETRON PER 1 MG: Performed by: FAMILY MEDICINE

## 2017-10-06 RX ADMIN — IOPAMIDOL 92 ML: 755 INJECTION, SOLUTION INTRAVENOUS at 17:00

## 2017-10-06 RX ADMIN — PRAMIPEXOLE DIHYDROCHLORIDE 2 MG: 1 TABLET ORAL at 21:08

## 2017-10-06 RX ADMIN — RANOLAZINE 1000 MG: 500 TABLET, FILM COATED, EXTENDED RELEASE ORAL at 08:50

## 2017-10-06 RX ADMIN — TRAZODONE HYDROCHLORIDE 300 MG: 150 TABLET ORAL at 21:10

## 2017-10-06 RX ADMIN — MORPHINE SULFATE 2 MG: 10 INJECTION INTRAVENOUS at 08:50

## 2017-10-06 RX ADMIN — MORPHINE SULFATE 2 MG: 10 INJECTION INTRAVENOUS at 21:04

## 2017-10-06 RX ADMIN — RANOLAZINE 1000 MG: 500 TABLET, FILM COATED, EXTENDED RELEASE ORAL at 21:08

## 2017-10-06 RX ADMIN — LISINOPRIL 40 MG: 40 TABLET ORAL at 06:37

## 2017-10-06 RX ADMIN — ATORVASTATIN CALCIUM 20 MG: 20 TABLET, FILM COATED ORAL at 21:09

## 2017-10-06 RX ADMIN — ACETAMINOPHEN 650 MG: 325 TABLET ORAL at 07:31

## 2017-10-06 RX ADMIN — ONDANSETRON 4 MG: 2 INJECTION INTRAMUSCULAR; INTRAVENOUS at 07:31

## 2017-10-06 RX ADMIN — CLOPIDOGREL BISULFATE 75 MG: 75 TABLET ORAL at 21:09

## 2017-10-06 RX ADMIN — INSULIN ASPART 2 UNITS: 100 INJECTION, SOLUTION INTRAVENOUS; SUBCUTANEOUS at 08:52

## 2017-10-06 RX ADMIN — AMLODIPINE BESYLATE 10 MG: 10 TABLET ORAL at 21:10

## 2017-10-06 RX ADMIN — INSULIN ASPART 2 UNITS: 100 INJECTION, SOLUTION INTRAVENOUS; SUBCUTANEOUS at 17:37

## 2017-10-06 RX ADMIN — METOPROLOL TARTRATE 25 MG: 25 TABLET ORAL at 14:26

## 2017-10-06 RX ADMIN — INSULIN ASPART 2 UNITS: 100 INJECTION, SOLUTION INTRAVENOUS; SUBCUTANEOUS at 12:35

## 2017-10-06 RX ADMIN — METFORMIN HYDROCHLORIDE 1000 MG: 500 TABLET ORAL at 08:50

## 2017-10-06 RX ADMIN — ISOSORBIDE MONONITRATE 120 MG: 60 TABLET, EXTENDED RELEASE ORAL at 06:37

## 2017-10-06 RX ADMIN — SERTRALINE HYDROCHLORIDE 25 MG: 25 TABLET ORAL at 08:50

## 2017-10-06 RX ADMIN — PANTOPRAZOLE SODIUM 40 MG: 40 TABLET, DELAYED RELEASE ORAL at 21:09

## 2017-10-06 RX ADMIN — METOPROLOL SUCCINATE 100 MG: 100 TABLET, EXTENDED RELEASE ORAL at 21:13

## 2017-10-06 RX ADMIN — RIVAROXABAN 20 MG: 10 TABLET, FILM COATED ORAL at 08:50

## 2017-10-06 NOTE — CONSULTS
"Adult Nutrition  Assessment    Patient Name:  Yordy Hansen  YOB: 1980  MRN: [id number]  Admit Date:  10/5/2017    Assessment Date:  10/6/2017    Comments:  BMI 67 with pt at 109% IBW.  Making Lifestyle Choices for a Healthier Weight used to provide ed regarding Wt Loss diet and diet copy given.          Reason for Assessment       10/06/17 1217    Reason for Assessment    Reason For Assessment/Visit education;per organizational policy    Identified At Risk By Screening Criteria need for education;BMI                  Anthropometrics       10/06/17 1217    Anthropometrics    Height 177.8 cm (70\")    Weight (!)  212 kg (467 lb 6 oz)    Ideal Body Weight (IBW)    Ideal Body Weight (IBW), Male (kg) 76.48    % Ideal Body Weight 277.78    Body Mass Index (BMI)    BMI (kg/m2) 67.2    BMI Grade greater than 40 - obesity grade III                Estimated/Assessed Needs       10/06/17 1218    Calculation Measurements    Weight Used For Calculations 110 kg (241 lb 10 oz)    Height Used for Calculations 1.778 m (5' 10\")    Estimated/Assessed Protein Needs    Weight Used for Protein Calculation 110 kg (241 lb 10 oz)    Protein (gm/kg) 1.2    1.2 Gm Protein (gm) 131.52    Estimated Protein Range 109 - 131    Estimated/Assessed Fluid Needs    Fluid Need Method --   3288                Electronically signed by:  Ananya Oglesby RD  10/06/17 12:20 PM  "

## 2017-10-06 NOTE — CONSULTS
Cardiology Consultation Note.        Patient Name: Yordy Hansen  Age/Sex: 37 y.o. male  : 1980  MRN: [id number]    Date of consultation: 10/6/2017  Consulting Physician: Marc Grimes MD  Primary care Physician: LALA Barajas  Requesting Physician:  Yordy Goncalves MD   Reason for consultation:  Chest pain history of coronary coronary artery disease.      Subjective:       Chief Complaint: Chest pain    History of Present Illness:  Yordy Hansen is a 37 y.o. male     Body mass index is 67.09 kg/(m^2).  with a past medical history significant for atherosclerotic coronary artery disease, previous PTCA and stenting of the left anterior descending artery done in , with repeat coronary angiogram done in 2017 which had revealed in-stent 80% stenosis in the left anterior descending artery with successful PTCA and stenting with deployment of a 2.5 mm x 18 mm Alpine drug-eluting stent, arterial hypertension hypertensive heart disease, gastroesophageal reflux disease chronic back pain and hyperlipidemia, diabetes and history of pulmonary embolism on chronic anticoagulation with Xarelto.  Patient presented to the emergency room with symptoms of substernal chest discomfort associated with shortness of breath.  Patient on questioning does complain of having symptoms of palpitation.  Patient denies any symptoms of lightheaded dizziness or complete loss of consciousness.  Of note patient ran out of the office Plavix and has not taken the Plavix for the last 3 days.  Patient on further questioning denies any fever or chill productive cough.  Patient denies any hemoptysis hematuria bright red blood per rectum.  Patient does complain of having bilateral leg discomfort.  Patient denies any bleeding diastasis.  Patient resting echocardiogram done reveals sinus rhythm without any acute ST-T wave changes.    Patient 10 point review of system except for what is stated in the history of present illness  "is negative.        Past Medical History:  Past Medical History:   Diagnosis Date   • Allergic rhinitis    • Asthma    • Chest pain    • CHF (congestive heart failure)    • Chronic back pain    • Coronary artery disease     2 stents.  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\"   • GERD (gastroesophageal reflux disease)    • Hyperlipidemia    • Hypertension    • Low back pain    • Morbid obesity    • Pulmonary embolism    • RLS (restless legs syndrome)    • Seizures    • Sleep apnea        Past Surgical History:  Past Surgical History:   Procedure Laterality Date   • ADENOIDECTOMY     • CARDIAC CATHETERIZATION N/A 3/15/2017    Procedure: Left Heart Cath;  Surgeon: Edwige Dillard MD;  Location: [facility] CATH INVASIVE LOCATION;  Service:    • CARDIAC CATHETERIZATION N/A 3/15/2017    Procedure: Stent SOPHIA coronary;  Surgeon: Edwige Dillard MD;  Location: [facility] CATH INVASIVE LOCATION;  Service:    • CHOLECYSTECTOMY     • CORONARY ANGIOPLASTY WITH STENT PLACEMENT     • GA RT/LT HEART CATHETERS N/A 3/15/2017    Procedure: Percutaneous Coronary Intervention;  Surgeon: Edwige Dillard MD;  Location: [facility] INVASIVE LOCATION;  Service: Cardiovascular   • TONSILLECTOMY     • TRANSESOPHAGEAL ECHOCARDIOGRAM (MONICA)         Family History:  Family History   Problem Relation Age of Onset   • Cancer Other    • Coronary artery disease Other    • Diabetes Other    • Heart disease Other    • Hypertension Other        Social History:  Social History     Social History   • Marital status:      Spouse name: Cori   • Number of children: 0   • Years of education: N/A     Occupational History   • Disabled      Social History Main Topics   • Smoking status: Former Smoker     Quit date: 1997   • Smokeless tobacco: Former User     Types: Snuff     Quit date: 2017   • Alcohol use No   • Drug use: No   • Sexual activity: Defer     Other Topics Concern   • Not on file     Social " History Narrative        Cardiac Risk Factors: Male, Hypertension, Diabetes, Hyperlipidemia, Tobacco abuse and Obesity      Allergies:  Allergies   Allergen Reactions   • Glipizide Other (See Comments)     Hallucinations     • Phenergan [Promethazine Hcl] Other (See Comments)     Burning veins   • Risperdal [Risperidone] Other (See Comments)     Slurred speech   • Tramadol Other (See Comments)     seizures   • Reglan [Metoclopramide] Anxiety       Medication::  Prescriptions Prior to Admission   Medication Sig Dispense Refill Last Dose   • amLODIPine (NORVASC) 10 MG tablet Take 1 tablet by mouth Every Night. 30 tablet 5 10/4/2017 at Unknown time   • Canagliflozin (INVOKANA) 300 MG tablet Take 300 mg by mouth Every Morning Before Breakfast for 90 days. 90 tablet 0 10/4/2017 at Unknown time   • chlorhexidine (PERIDEX) 0.12 % solution 15 cc swish and spit twice a day 473 mL 5 10/4/2017 at Unknown time   • clopidogrel (PLAVIX) 75 MG tablet Take 1 tablet by mouth Every Night. 30 tablet 1 10/5/2017 at Unknown time   • isosorbide mononitrate (IMDUR) 120 MG 24 hr tablet Take 1 tablet by mouth Every Morning. 30 tablet 5 10/4/2017 at Unknown time   • lisinopril (PRINIVIL,ZESTRIL) 40 MG tablet Take 1 tablet by mouth Every Morning. 30 tablet 5 10/4/2017 at Unknown time   • metFORMIN (GLUCOPHAGE) 1000 MG tablet Take 1,000 mg by mouth 2 (Two) Times a Day With Meals.   10/4/2017 at Unknown time   • metoprolol succinate XL (TOPROL-XL) 100 MG 24 hr tablet Take 1 tablet by mouth Every Night. 30 tablet 5 10/4/2017 at Unknown time   • pantoprazole (PROTONIX) 40 MG EC tablet Take 1 tablet by mouth Every Night. 30 tablet 5 10/4/2017 at Unknown time   • pramipexole (MIRAPEX) 1 MG tablet Take 2 tablets by mouth Every Night. Taking 2mg daily 30 tablet 5 10/4/2017 at Unknown time   • pravastatin (PRAVACHOL) 80 MG tablet Take 1 tablet by mouth Every Night. 30 tablet 5 10/4/2017 at Unknown time   • ranolazine (RANEXA) 1000 MG 12 hr tablet Take  1 tablet by mouth Every 12 (Twelve) Hours. 60 tablet 5 10/4/2017 at Unknown time   • rivaroxaban (XARELTO) 20 MG tablet Take 1 tablet by mouth Daily. (Patient taking differently: Take 20 mg by mouth Daily With Dinner.) 30 tablet 11 10/4/2017 at Unknown time   • sertraline (ZOLOFT) 25 MG tablet Take 1 tablet by mouth Daily. (Patient taking differently: Take 25 mg by mouth Every Night.) 30 tablet 5 10/4/2017 at Unknown time   • traZODone (DESYREL) 300 MG tablet Take 1 tablet by mouth Every Night. 30 tablet 5 10/4/2017 at Unknown time   • albuterol (ACCUNEB) 1.25 MG/3ML nebulizer solution Take 1 ampule by nebulization Every 4 (Four) Hours As Needed for Wheezing.   Unknown at Unknown time   • albuterol (PROVENTIL HFA;VENTOLIN HFA) 108 (90 BASE) MCG/ACT inhaler Inhale 2 puffs Every 4 (Four) Hours As Needed for Wheezing.   Unknown at Unknown time   • amoxicillin-clavulanate (AUGMENTIN) 875-125 MG per tablet Take 1 tablet by mouth 2 (Two) Times a Day. 20 tablet 0 Taking   • [] ciprofloxacin (CIPRO) 500 MG tablet Take 1 tablet by mouth 2 (Two) Times a Day for 10 days. 14 tablet 0 Taking   • guaiFENesin (MUCINEX) 600 MG 12 hr tablet Take 1 tablet by mouth 2 (Two) Times a Day. 14 tablet 0 Taking   • MICROLET LANCETS misc USE TO TEST BLOOD SUGAR EVERY DAY AND AS NEEDED   Taking   • neomycin-polymyxin-hydrocortisone (CORTISPORIN) 3.5-38701-9 otic suspension    Taking   • nitroglycerin (NITROSTAT) 0.4 MG SL tablet Place 1 tablet under the tongue Every 5 (Five) Minutes As Needed for Chest Pain. 25 tablet 6 More than a month at Unknown time   • ondansetron ODT (ZOFRAN-ODT) 4 MG disintegrating tablet Take 1 tablet by mouth Every 6 (Six) Hours As Needed for Nausea or Vomiting. 12 tablet 0 More than a month at Unknown time   • oxymetazoline (AFRIN) 0.05 % nasal spray 2 sprays into each nostril 2 (Two) Times a Day. 20 mL 0 Taking           Review of Systems:       Constitutional:  Denies recent weight loss, weight gain, fever  or chills, no change in exercise tolerance     HENT:  Denies any hearing loss, epistaxis, hoarseness, or difficulty speaking.     Eyes: Wears eyeglasses or contact lenses     Respiratory:  Denies dyspnea with exertion,no cough, wheezing, or hemoptysis.     Cardiovascular: Positive for chest pain.  Negative for palpitations,  orthopnea, PND, peripheral edema, syncope, or claudication.     Gastrointestinal:  Denies change in bowel habits, dyspepsia, ulcer disease, hematochezia, or melena.  No nausea, no vomiting, no hematemesis, no diarrhea or constipation, no melena      Endocrine: Negative for cold intolerance, heat intolerance, polydipsia, polyphagia and polyuria. Denies any history of weight change, heat/cold intolerance, polydipsia, polyuria     Genitourinary: Negative for hematuria.      Musculoskeletal: Denies any history of arthritic symptoms or back problems .  No joint pain, joint stiffness, joint swelling, muscle pain, muscle weakness and neck pain    Skin:  Denies any change in hair or nails, rashes, or skin lesions.     Allergic/Immunologic: Negative.  Negative for environmental allergies, food allergies and immunocompromised state.     Neurological:  Denies any history of recurrent headaches, strokes, TIA, or seizure disorder.     Hematological: Denies excessive bleeding, easy bruising, fatigue, lymphadenopathy and petechiae or any bleeding disorders, or lymphadenopathy.     Psychiatric/Behavioral: Denies any history of depression, substance abuse, or change in cognitive function. Denies any psychomotor reaction or tangential thought.  No depression, homicidal ideations and suicidal ideations    Endocrine: No frequent urination and nocturia, temperature intolerance, weight gain, unintended and weight loss, unintended            Objective:     Objective:  Vitals:    10/06/17 0802   BP: 144/68   Pulse: 64   Resp: 19   Temp: 97.5 °F (36.4 °C)   SpO2: 93%     .    Body mass index is 67.09 kg/(m^2).            Physical Exam:   General Appearance:    Alert, oriented, cooperative, in no acute distress   Head:    Normocephalic, atraumatic, without obvious abnormality   Eyes:           JAMAR  Lids and lashes normal, conjunctivae and sclerae normal, no icterus, no pallor   Ears:    Ears appear intact with no abnormalities noted   Throat:   Mucous membranes pink and moist   Neck:   Supple, trachea midline, no carotid bruit, no organomegaly or JVD   Lungs:     Clear to auscultation and percussion, respirations regular, even and Unlabored. No wheezes, rales, rhonchi    Heart:    Regular rhythm and normal rate, normal S1 and S2, no            murmur, no gallop, no rub, no click   Abdomen:     Soft, non-tender, non-distended, no guarding, no rebound tenderness, Normal bowel sounds in all four quadrants, no masses, liver and spleen nonpalpable,    Genitalia:    Deferred   Extremities:   Moves all extremities well, no edema, no cyanosis, no              Redness, no clubbing   Pulses:   Pulses palpable and equal bilaterally   Skin:   Moist and warm. No bleeding, bruising or rash   Neurologic/Psychiatric:   Alert and oriented to person, place, and time.  Motor, power and tone in upper and lower extremities are grossly intact. No focal neurological deficits. Normal cognitive function. No psychomotor reaction or tangential thought. No depression, homicidal ideations and suicidal ideations           Lab Review:       Results from last 7 days  Lab Units 10/06/17  0526 10/05/17  0533   SODIUM mmol/L 139 138   POTASSIUM mmol/L 4.3 3.6   CHLORIDE mmol/L 101 100   CO2 mmol/L 28.0 28.0   BUN mg/dL 9 7   CREATININE mg/dL 0.70 0.70   CALCIUM mg/dL 8.8 8.8   BILIRUBIN mg/dL  --  0.8   ALK PHOS U/L  --  67   ALT (SGPT) U/L  --  40   AST (SGOT) U/L  --  24   GLUCOSE mg/dL 179* 244*       Results from last 7 days  Lab Units 10/05/17  1659 10/05/17  0952 10/05/17  0533   CK TOTAL U/L  --  170  --    TROPONIN I ng/mL <0.012 <0.012 <0.012            Results from last 7 days  Lab Units 10/06/17  0526   WBC 10*3/mm3 8.51   HEMOGLOBIN g/dL 13.2*   HEMATOCRIT % 38.9*   PLATELETS 10*3/mm3 181                       Invalid input(s):  T4,  FREET4    EKG:   ECG/EMG Results (last 24 hours)     Procedure Component Value Units Date/Time    SCANNED EKG [[id number]] Resulted:  10/05/17      Updated:  10/05/17 1637    SCANNED EKG [[id number]] Resulted:  10/05/17      Updated:  10/05/17 2135          Imaging:  Imaging Results (last 24 hours)     ** No results found for the last 24 hours. **          I personally viewed and interpreted the patient's EKG/Telemetry data.    Assessment:   1.  Chest pain with history of atherosclerotic coronary artery disease with previous PTCA and stenting of the left anterior descending artery.  2.  History of pulmonary embolism on chronic anticoagulation with Xarelto.  3.  Arterial hypertension.  4.  Hypertensive heart disease.  5.  Obesity.          Plan:   1.  Chest pain.  Patient continues to have on and off symptoms of discomfort with resting electrical gram did does not show any acute ST-T wave changes.  Patient troponin is negative.  At the present time patient has been recommended to undergo a CTA of the coronaries to rule out restenosis of the previously angioplastied left anterior descending artery.  Risk-benefit treatment option for the CTA was discussed with the patient and an informed consent was obtained from the patient for the CT angiogram.Due to the patient's body habitus patient may have a suboptimal CT angiogram was discussed with the radiologist.  Patient has been recommended to restart the Plavix and to be compliant with medication.  Patient has been recommended to continue with the Ranexa.  2.  History of pulmonary embolism.  Patient is currently anticoagulated with Xarelto.  3.  Arterial hypertension.  Patient blood pressure is labile but well controlled.  Patient would be continued on the present dose of the  lisinopril.  4.  Hypertensive heart disease.  Clinically patient is not in congestive heart failure.  5.  Obesity.  Patient has been recommended on weight reduction lifestyle modification and dietary restriction.      Thank you for the consultation.    The above plan of management were discussed with the patient.    Dr. Jacobo to cover the weekend if needed.       Time: time spent in face-to-face evaluation of greater than 55  minutes and interacting and formulating examining and discussing the plan with the patient with 50% of greater time spent in face-to-face interaction.    Marc Grimes MD  10/06/17  11:40 AM  Dictated utilizing Dragon dictation.

## 2017-10-06 NOTE — PROGRESS NOTES
[facility] Medicine Services  INPATIENT PROGRESS NOTE    Length of Stay: 0  Date of Admission: 10/5/2017  Primary Care Physician: LALA Barajas    Subjective   Chief Complaint:  Chest Pain  HPI:  Chest pain is currently better.  Still having some intermittent pain.    Review of Systems   Constitutional: Negative for appetite change, chills, fatigue and fever.   Respiratory: Positive for shortness of breath. Negative for chest tightness.    Cardiovascular: Positive for chest pain. Negative for palpitations and leg swelling.   Gastrointestinal: Negative for abdominal pain, constipation, diarrhea, nausea and vomiting.   Skin: Negative for wound.   Neurological: Negative for dizziness, weakness, light-headedness, numbness and headaches.        All pertinent negatives and positives are as above. All other systems have been reviewed and are negative unless otherwise stated.     Objective    Temp:  [97 °F (36.1 °C)-97.5 °F (36.4 °C)] 97.5 °F (36.4 °C)  Heart Rate:  [64-73] 64  Resp:  [18-24] 19  BP: (125-147)/(60-81) 144/68    Physical Exam   Constitutional: He appears well-developed and well-nourished.   Morbid obesity   HENT:   Head: Normocephalic and atraumatic.   Eyes: EOM are normal. Pupils are equal, round, and reactive to light.   Neck: Normal range of motion. Neck supple.   Cardiovascular: Normal rate, regular rhythm, normal heart sounds and intact distal pulses.  Exam reveals no gallop and no friction rub.    No murmur heard.  Pulmonary/Chest: Effort normal and breath sounds normal. No respiratory distress. He has no wheezes. He has no rales. He exhibits no tenderness.   Abdominal: Soft. Bowel sounds are normal. He exhibits no distension. There is no tenderness.   Musculoskeletal: He exhibits edema.   Psychiatric: He has a normal mood and affect. His behavior is normal.   Vitals reviewed.          Results Review:  I have reviewed the labs, radiology results, and  diagnostic studies.    Laboratory Data:     Results from last 7 days  Lab Units 10/06/17  0526 10/05/17  0533   SODIUM mmol/L 139 138   POTASSIUM mmol/L 4.3 3.6   CHLORIDE mmol/L 101 100   CO2 mmol/L 28.0 28.0   BUN mg/dL 9 7   CREATININE mg/dL 0.70 0.70   GLUCOSE mg/dL 179* 244*   CALCIUM mg/dL 8.8 8.8   BILIRUBIN mg/dL  --  0.8   ALK PHOS U/L  --  67   ALT (SGPT) U/L  --  40   AST (SGOT) U/L  --  24   ANION GAP mmol/L 10.0 10.0     Estimated Creatinine Clearance: 258.5 mL/min (by C-G formula based on Cr of 0.7).            Results from last 7 days  Lab Units 10/06/17  0526 10/05/17  0533   WBC 10*3/mm3 8.51 7.10   HEMOGLOBIN g/dL 13.2* 12.6*   HEMATOCRIT % 38.9* 38.1*   PLATELETS 10*3/mm3 181 182       Radiology Data:   Imaging Results (last 24 hours)     ** No results found for the last 24 hours. **          I have reviewed the patient current medications.     Assessment/Plan     Hospital Problem List     Chest pain          Plan:    1.  Chest Pain:  Cardiac enzymes negative.  History of CAD.  I have discussed with Dr. Briceno, the patient's primary cardiologist.  Dr. Briceno no longer works at this facility, so I have discussed with Dr. Grimes who has agreed to see the patient in consultation.  2.  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  3.  CAD  4.  HTN  5.  History of PE:  On Eliquis.  6.  Morbid Obesity  7.  Sleep Apnea              Discharge Planning: I expect patient to be discharged to home in 1-2 days.        This document has been electronically signed by Yordy Goncalves MD on October 6, 2017 11:21 AM

## 2017-10-06 NOTE — PLAN OF CARE
Problem: Patient Care Overview (Adult)  Goal: Plan of Care Review  Outcome: Ongoing (interventions implemented as appropriate)    10/06/17 1311   Coping/Psychosocial Response Interventions   Plan Of Care Reviewed With patient   Patient Care Overview   Progress no change       Goal: Adult Individualization and Mutuality  Outcome: Ongoing (interventions implemented as appropriate)    Problem: Acute Coronary Syndrome (ACS) (Adult)  Goal: Signs and Symptoms of Listed Potential Problems Will be Absent or Manageable (Acute Coronary Syndrome)  Outcome: Ongoing (interventions implemented as appropriate)

## 2017-10-06 NOTE — PLAN OF CARE
Problem: Patient Care Overview (Adult)  Goal: Plan of Care Review  Outcome: Ongoing (interventions implemented as appropriate)  Making LIfesityle Choices for a Healthier Weight used to provide ed regarding Wt Loss diet and diet copy given.    10/06/17 1311   Coping/Psychosocial Response Interventions   Plan Of Care Reviewed With patient   Patient Care Overview   Progress no change   Outcome Evaluation   Outcome Summary/Follow up Plan initial assessment

## 2017-10-07 LAB
ANION GAP SERPL CALCULATED.3IONS-SCNC: 11 MMOL/L (ref 5–15)
BASOPHILS # BLD AUTO: 0.02 10*3/MM3 (ref 0–0.2)
BASOPHILS NFR BLD AUTO: 0.3 % (ref 0–2)
BUN BLD-MCNC: 10 MG/DL (ref 7–21)
BUN/CREAT SERPL: 14.1 (ref 7–25)
CALCIUM SPEC-SCNC: 9 MG/DL (ref 8.4–10.2)
CHLORIDE SERPL-SCNC: 98 MMOL/L (ref 95–110)
CO2 SERPL-SCNC: 29 MMOL/L (ref 22–31)
CREAT BLD-MCNC: 0.71 MG/DL (ref 0.7–1.3)
DEPRECATED RDW RBC AUTO: 45.6 FL (ref 35.1–43.9)
EOSINOPHIL # BLD AUTO: 0.3 10*3/MM3 (ref 0–0.7)
EOSINOPHIL NFR BLD AUTO: 4.6 % (ref 0–7)
ERYTHROCYTE [DISTWIDTH] IN BLOOD BY AUTOMATED COUNT: 15 % (ref 11.5–14.5)
GFR SERPL CREATININE-BSD FRML MDRD: 124 ML/MIN/1.73 (ref 70–162)
GLUCOSE BLD-MCNC: 191 MG/DL (ref 60–100)
GLUCOSE BLDC GLUCOMTR-MCNC: 173 MG/DL (ref 70–130)
GLUCOSE BLDC GLUCOMTR-MCNC: 188 MG/DL (ref 70–130)
GLUCOSE BLDC GLUCOMTR-MCNC: 219 MG/DL (ref 70–130)
GLUCOSE BLDC GLUCOMTR-MCNC: 251 MG/DL (ref 70–130)
HCT VFR BLD AUTO: 37.7 % (ref 39–49)
HGB BLD-MCNC: 12.4 G/DL (ref 13.7–17.3)
IMM GRANULOCYTES # BLD: 0.1 10*3/MM3 (ref 0–0.02)
IMM GRANULOCYTES NFR BLD: 1.5 % (ref 0–0.5)
LYMPHOCYTES # BLD AUTO: 1.29 10*3/MM3 (ref 0.6–4.2)
LYMPHOCYTES NFR BLD AUTO: 19.6 % (ref 10–50)
MCH RBC QN AUTO: 27.4 PG (ref 26.5–34)
MCHC RBC AUTO-ENTMCNC: 32.9 G/DL (ref 31.5–36.3)
MCV RBC AUTO: 83.2 FL (ref 80–98)
MONOCYTES # BLD AUTO: 0.68 10*3/MM3 (ref 0–0.9)
MONOCYTES NFR BLD AUTO: 10.4 % (ref 0–12)
NEUTROPHILS # BLD AUTO: 4.18 10*3/MM3 (ref 2–8.6)
NEUTROPHILS NFR BLD AUTO: 63.6 % (ref 37–80)
PLATELET # BLD AUTO: 160 10*3/MM3 (ref 150–450)
PMV BLD AUTO: 9.3 FL (ref 8–12)
POTASSIUM BLD-SCNC: 3.8 MMOL/L (ref 3.5–5.1)
RBC # BLD AUTO: 4.53 10*6/MM3 (ref 4.37–5.74)
SODIUM BLD-SCNC: 138 MMOL/L (ref 137–145)
WBC NRBC COR # BLD: 6.57 10*3/MM3 (ref 3.2–9.8)

## 2017-10-07 PROCEDURE — 82962 GLUCOSE BLOOD TEST: CPT

## 2017-10-07 PROCEDURE — G0378 HOSPITAL OBSERVATION PER HR: HCPCS

## 2017-10-07 PROCEDURE — 85025 COMPLETE CBC W/AUTO DIFF WBC: CPT | Performed by: FAMILY MEDICINE

## 2017-10-07 PROCEDURE — 25010000002 MORPHINE (PF) 10 MG/ML SOLUTION: Performed by: FAMILY MEDICINE

## 2017-10-07 PROCEDURE — 80048 BASIC METABOLIC PNL TOTAL CA: CPT | Performed by: FAMILY MEDICINE

## 2017-10-07 PROCEDURE — 25010000002 MORPHINE PER 10 MG: Performed by: FAMILY MEDICINE

## 2017-10-07 PROCEDURE — 63710000001 INSULIN ASPART PER 5 UNITS: Performed by: FAMILY MEDICINE

## 2017-10-07 RX ORDER — SODIUM CHLORIDE 0.9 % (FLUSH) 0.9 %
SYRINGE (ML) INJECTION
Status: COMPLETED
Start: 2017-10-07 | End: 2017-10-07

## 2017-10-07 RX ADMIN — METOPROLOL SUCCINATE 100 MG: 100 TABLET, EXTENDED RELEASE ORAL at 20:11

## 2017-10-07 RX ADMIN — MORPHINE SULFATE 2 MG: 10 INJECTION INTRAVENOUS at 13:47

## 2017-10-07 RX ADMIN — SERTRALINE HYDROCHLORIDE 25 MG: 25 TABLET ORAL at 08:33

## 2017-10-07 RX ADMIN — INSULIN ASPART 4 UNITS: 100 INJECTION, SOLUTION INTRAVENOUS; SUBCUTANEOUS at 17:47

## 2017-10-07 RX ADMIN — SODIUM CHLORIDE, PRESERVATIVE FREE 3 ML: 5 INJECTION INTRAVENOUS at 20:26

## 2017-10-07 RX ADMIN — RANOLAZINE 1000 MG: 500 TABLET, FILM COATED, EXTENDED RELEASE ORAL at 20:12

## 2017-10-07 RX ADMIN — RANOLAZINE 1000 MG: 500 TABLET, FILM COATED, EXTENDED RELEASE ORAL at 08:33

## 2017-10-07 RX ADMIN — CLOPIDOGREL BISULFATE 75 MG: 75 TABLET ORAL at 20:12

## 2017-10-07 RX ADMIN — INSULIN ASPART 6 UNITS: 100 INJECTION, SOLUTION INTRAVENOUS; SUBCUTANEOUS at 20:23

## 2017-10-07 RX ADMIN — AMLODIPINE BESYLATE 10 MG: 10 TABLET ORAL at 20:11

## 2017-10-07 RX ADMIN — TRAZODONE HYDROCHLORIDE 300 MG: 150 TABLET ORAL at 20:13

## 2017-10-07 RX ADMIN — PANTOPRAZOLE SODIUM 40 MG: 40 TABLET, DELAYED RELEASE ORAL at 20:13

## 2017-10-07 RX ADMIN — ATORVASTATIN CALCIUM 20 MG: 20 TABLET, FILM COATED ORAL at 20:12

## 2017-10-07 RX ADMIN — ISOSORBIDE MONONITRATE 120 MG: 60 TABLET, EXTENDED RELEASE ORAL at 06:23

## 2017-10-07 RX ADMIN — ACETAMINOPHEN 650 MG: 325 TABLET ORAL at 08:33

## 2017-10-07 RX ADMIN — LISINOPRIL 40 MG: 40 TABLET ORAL at 06:23

## 2017-10-07 RX ADMIN — MORPHINE SULFATE 2 MG: 10 INJECTION INTRAVENOUS at 19:09

## 2017-10-07 RX ADMIN — INSULIN ASPART 2 UNITS: 100 INJECTION, SOLUTION INTRAVENOUS; SUBCUTANEOUS at 08:34

## 2017-10-07 RX ADMIN — RIVAROXABAN 20 MG: 10 TABLET, FILM COATED ORAL at 08:33

## 2017-10-07 RX ADMIN — PRAMIPEXOLE DIHYDROCHLORIDE 2 MG: 1 TABLET ORAL at 20:10

## 2017-10-07 NOTE — PLAN OF CARE
Problem: Patient Care Overview (Adult)  Goal: Plan of Care Review  Outcome: Ongoing (interventions implemented as appropriate)    10/06/17 1311 10/06/17 2000   Coping/Psychosocial Response Interventions   Plan Of Care Reviewed With --  patient   Patient Care Overview   Progress no change --        Goal: Adult Individualization and Mutuality  Outcome: Ongoing (interventions implemented as appropriate)    Problem: Acute Coronary Syndrome (ACS) (Adult)  Goal: Signs and Symptoms of Listed Potential Problems Will be Absent or Manageable (Acute Coronary Syndrome)  Outcome: Ongoing (interventions implemented as appropriate)    Problem: Pain, Chronic (Adult)  Goal: Identify Related Risk Factors and Signs and Symptoms  Outcome: Ongoing (interventions implemented as appropriate)  Goal: Acceptable Pain Control/Comfort Level  Outcome: Ongoing (interventions implemented as appropriate)

## 2017-10-07 NOTE — PROGRESS NOTES
[facility] Medicine Services  INPATIENT PROGRESS NOTE    Length of Stay: 0  Date of Admission: 10/5/2017  Primary Care Physician: LALA Barajas    Subjective   Chief Complaint:   HPI:  37 y.o. male  with a past medical history significant for atherosclerotic coronary artery disease, previous PTCA and stenting of the left anterior descending artery done in 2016, that presented with Chest pain and continues to have pain. He complained of Chest pain again last night requiring morphine for resolution. Currently he is without Chest pain or pressure    Review of Systems   Review of Systems   Constitutional: Negative for appetite change, chills and fever.   HENT: Negative for congestion, ear pain, rhinorrhea, sneezing and sore throat.    Respiratory: Positive for shortness of breath. Negative for cough.    Cardiovascular: Positive for chest pain Last night. Negative for palpitations and leg swelling.   Gastrointestinal: Negative for diarrhea, nausea and vomiting.   Endocrine: Negative for polyphagia and polyuria.   Musculoskeletal: Negative for back pain and neck pain.   Skin: Negative for color change and rash.   Neurological: Negative for dizziness, syncope and weakness.   Psychiatric/Behavioral: Negative for agitation, confusion and dysphoric mood.     All pertinent negatives and positives are as above. All other systems have been reviewed and are negative unless otherwise stated.     Objective    Temp:  [96.6 °F (35.9 °C)-98.1 °F (36.7 °C)] 98.1 °F (36.7 °C)  Heart Rate:  [59-82] 75  Resp:  [19-20] 20  BP: (130-153)/(64-82) 137/64  Physical Exam  Constitutional: He is oriented to person, place, and time. He appears well-developed and well-nourished.   Cardiovascular: Normal rate, regular rhythm and normal heart sounds.  Exam reveals no gallop and no friction rub.    No murmur heard.  Pulmonary/Chest: Effort normal and breath sounds normal. No respiratory distress. He has  no wheezes. He has no rales.   Abdominal: Soft. Bowel sounds are normal. There is no tenderness.   Musculoskeletal: Normal range of motion. He exhibits no edema.   Neurological: He is alert and oriented to person, place, and time.   Skin: Skin is warm and dry.   Psychiatric: He has a normal mood and affect. His behavior is normal.   Vitals reviewed.        Results Review:  I have reviewed the labs, radiology results, and diagnostic studies.    Laboratory Data:   Lab Results (last 24 hours)     Procedure Component Value Units Date/Time    POC Glucose Fingerstick [[id number]]  (Abnormal) Collected:  10/06/17 1057    Specimen:  Blood Updated:  10/06/17 1110     Glucose 180 (H) mg/dL       RN NotifiedMeter: [id number]: [id number] [employer]       POC Glucose Fingerstick [[id number]]  (Abnormal) Collected:  10/06/17 1523    Specimen:  Blood Updated:  10/06/17 1601     Glucose 186 (H) mg/dL       RN NotifiedMeter: [id number]: [id number] BRANTLEY ANNA       POC Glucose Fingerstick [[id number]]  (Abnormal) Collected:  10/06/17 2027    Specimen:  Blood Updated:  10/06/17 2204     Glucose 145 (H) mg/dL       RN NotifiedMeter: [id number]: [id number] [employer] LULI       POC Glucose Fingerstick [[id number]]  (Abnormal) Collected:  10/07/17 0634    Specimen:  Blood Updated:  10/07/17 0655     Glucose 188 (H) mg/dL       RN NotifiedMeter: [id number]: [id number] CHESNEL LULI       CBC & Differential [[id number]] Collected:  10/07/17 0537    Specimen:  Blood Updated:  10/07/17 0657    Narrative:       The following orders were created for panel order CBC & Differential.  Procedure                               Abnormality         Status                     ---------                               -----------         ------                     CBC Auto Differential[[id number]]        Abnormal            Final result                 Please view results for these tests on the individual  orders.    CBC Auto Differential [[id number]]  (Abnormal) Collected:  10/07/17 0537    Specimen:  Blood Updated:  10/07/17 0657     WBC 6.57 10*3/mm3      RBC 4.53 10*6/mm3      Hemoglobin 12.4 (L) g/dL      Hematocrit 37.7 (L) %      MCV 83.2 fL      MCH 27.4 pg      MCHC 32.9 g/dL      RDW 15.0 (H) %      RDW-SD 45.6 (H) fl      MPV 9.3 fL      Platelets 160 10*3/mm3      Neutrophil % 63.6 %      Lymphocyte % 19.6 %      Monocyte % 10.4 %      Eosinophil % 4.6 %      Basophil % 0.3 %      Immature Grans % 1.5 (H) %      Neutrophils, Absolute 4.18 10*3/mm3      Lymphocytes, Absolute 1.29 10*3/mm3      Monocytes, Absolute 0.68 10*3/mm3      Eosinophils, Absolute 0.30 10*3/mm3      Basophils, Absolute 0.02 10*3/mm3      Immature Grans, Absolute 0.10 (H) 10*3/mm3     Basic Metabolic Panel [[id number]]  (Abnormal) Collected:  10/07/17 0537    Specimen:  Blood Updated:  10/07/17 0715     Glucose 191 (H) mg/dL      BUN 10 mg/dL      Creatinine 0.71 mg/dL      Sodium 138 mmol/L      Potassium 3.8 mmol/L      Chloride 98 mmol/L      CO2 29.0 mmol/L      Calcium 9.0 mg/dL      eGFR Non African Amer 124 mL/min/1.73      BUN/Creatinine Ratio 14.1     Anion Gap 11.0 mmol/L           Culture Data:   No results found for: BLOODCX  No results found for: URINECX  No results found for: RESPCX  No results found for: WOUNDCX  No results found for: STOOLCX  No components found for: BODYFLD    Radiology Data:   Imaging Results (last 24 hours)     Procedure Component Value Units Date/Time    CT Angiogram Coronary [[id number]] Collected:  10/06/17 1615     Updated:  10/06/17 2029    Narrative:       Procedure: CT angiogram coronary with contrast      CLINICAL HISTORY: Chest pain    COMPARISON: None.    Post processing was performed by the radiologist at the [employer]  workstation. Serial axial CT images were obtained through the  heart at 3 mm thickness without contrast for calcium scoring. 3D  images including vessel probing technique were  also obtained.  Subsequently, following the intravenous administration of 92 ml  of Isovue-370, serial axial CT images were obtained through the  heart at 0.6 mm thickness utilizing retrospective gating. This  exam was performed according to our departmental dose  optimization program, which includes automated exposure control,  adjustment of the mA and/or KV according to patient size and/or  use of iterative reconstruction technique. Full field of view  reconstructed images were used for evaluation of the extracardiac  tissues.    CALCIUM PLAQUE BURDEN:    REGION                                         CALCIUM SCORE  ([place])  Left Main                                                      15    Right Coronary Artery                                 5   Left Anterior Descending                            56   Circumflex                                                    0   Posterior Descending Artery                       76          Your Calcium Score is 76      This places the patent in the mild or minimal coronary narrowings  likely risk for coronary artery disease.    CTA OF THE CORONARY ARTERIES: There is grossly suboptimal  visualization of the left main, LAD, diagonals, circumflex, and  RCA.    Left Main: Unable to see the left main coronary artery well  enough to evaluate secondary to patient body habitus with  associated attenuation artifact and poor contrast bolus.  LAD: Unable to see the LAD coronary artery well enough to  evaluate secondary to patient body habitus with associated  attenuation artifact and poor contrast injection. Mid to distal  coronary stent in place. Unable to evaluate patency due to  attenuation artifact and poor contrast bolus.  Circumflex: Unable to see the left circumflex coronary artery  well enough to evaluate secondary to patient body habitus with  associated attenuation artifact and poor contrast bolus.   RCA: Unable to see the right coronary artery well enough to  evaluate  secondary to patient body habitus with associated  attenuation artifact and poor contrast bolus.     The aortic valve is tricuspid. There is no myocardial bridging.  On short axis views, the myocardium is homogeneous in thickness.    EXTRACARDIAC SOFT TISSUES:  Mediastinum: On the imaging, the ascending and descending aorta  are normal in caliber. There is no mediastinal or hilar  lymphadenopathy. The pericardium is normal.  Lungs: No consolidation or focal nodules are present. There is no  pneumothorax or effusion. The pulmonary arteries are normal in  appearance.  Abdomen: No evidence of hiatal hernia. The imaged liver and  spleen are unremarkable. There is no lymphadenopathy in the upper  abdomen.  Bones: There are no lytic or blastic lesions within the osseous  structures.    CT FUNCTIONAL ANALYSIS:  Ejection Fraction     79 %  Diastolic Volume     70 ml  Systolic Volume      15 ml  Stroke Volume        55 ml  Cardiac Output       3.2 L/minute      Impression:       CONCLUSION:  1.  Patient's calcium score is 76.  2.  Patient's cardiac ejection fraction is 79%.  3.  Nondiagnostic CT angiogram coronary study secondary to  patient body habitus with associated attenuation artifact as well  as poor contrast bolus.  4.  Referring clinician notified of findings by phone.    Electronically signed by:  Alvaro Ojeda MD  10/6/2017 8:24 PM CDT  Workstation: [id number]          I have reviewed the patient current medications.     Assessment/Plan     Hospital Problem List     Chest pain          Plan: Cardiology on consult. Pt still having symptoms. Will continue current treatment and discuss with cardiology. Remains on Statin, plavix, nitrates, Metoprolol and Ranexa. Continue Morphine prn pain episodes. CT angio reviewed.          Antony Frost DO   10/07/17   9:38 AM

## 2017-10-07 NOTE — PLAN OF CARE
Problem: Patient Care Overview (Adult)  Goal: Plan of Care Review  Outcome: Ongoing (interventions implemented as appropriate)  Goal: Adult Individualization and Mutuality  Outcome: Ongoing (interventions implemented as appropriate)  Goal: Discharge Needs Assessment  Outcome: Ongoing (interventions implemented as appropriate)    Problem: Acute Coronary Syndrome (ACS) (Adult)  Goal: Signs and Symptoms of Listed Potential Problems Will be Absent or Manageable (Acute Coronary Syndrome)  Outcome: Ongoing (interventions implemented as appropriate)    Problem: Pain, Chronic (Adult)  Goal: Identify Related Risk Factors and Signs and Symptoms  Outcome: Ongoing (interventions implemented as appropriate)  Goal: Acceptable Pain Control/Comfort Level  Outcome: Ongoing (interventions implemented as appropriate)

## 2017-10-08 LAB
ANION GAP SERPL CALCULATED.3IONS-SCNC: 11 MMOL/L (ref 5–15)
BASOPHILS # BLD AUTO: 0.02 10*3/MM3 (ref 0–0.2)
BASOPHILS NFR BLD AUTO: 0.3 % (ref 0–2)
BUN BLD-MCNC: 8 MG/DL (ref 7–21)
BUN/CREAT SERPL: 11.1 (ref 7–25)
CALCIUM SPEC-SCNC: 8.7 MG/DL (ref 8.4–10.2)
CHLORIDE SERPL-SCNC: 96 MMOL/L (ref 95–110)
CO2 SERPL-SCNC: 32 MMOL/L (ref 22–31)
CREAT BLD-MCNC: 0.72 MG/DL (ref 0.7–1.3)
DEPRECATED RDW RBC AUTO: 46 FL (ref 35.1–43.9)
EOSINOPHIL # BLD AUTO: 0.3 10*3/MM3 (ref 0–0.7)
EOSINOPHIL NFR BLD AUTO: 4 % (ref 0–7)
ERYTHROCYTE [DISTWIDTH] IN BLOOD BY AUTOMATED COUNT: 15 % (ref 11.5–14.5)
GFR SERPL CREATININE-BSD FRML MDRD: 122 ML/MIN/1.73 (ref 70–162)
GLUCOSE BLD-MCNC: 226 MG/DL (ref 60–100)
GLUCOSE BLDC GLUCOMTR-MCNC: 164 MG/DL (ref 70–130)
GLUCOSE BLDC GLUCOMTR-MCNC: 187 MG/DL (ref 70–130)
GLUCOSE BLDC GLUCOMTR-MCNC: 199 MG/DL (ref 70–130)
GLUCOSE BLDC GLUCOMTR-MCNC: 251 MG/DL (ref 70–130)
HCT VFR BLD AUTO: 39.6 % (ref 39–49)
HGB BLD-MCNC: 12.7 G/DL (ref 13.7–17.3)
IMM GRANULOCYTES # BLD: 0.11 10*3/MM3 (ref 0–0.02)
IMM GRANULOCYTES NFR BLD: 1.5 % (ref 0–0.5)
LYMPHOCYTES # BLD AUTO: 1.23 10*3/MM3 (ref 0.6–4.2)
LYMPHOCYTES NFR BLD AUTO: 16.2 % (ref 10–50)
MCH RBC QN AUTO: 26.8 PG (ref 26.5–34)
MCHC RBC AUTO-ENTMCNC: 32.1 G/DL (ref 31.5–36.3)
MCV RBC AUTO: 83.7 FL (ref 80–98)
MONOCYTES # BLD AUTO: 0.82 10*3/MM3 (ref 0–0.9)
MONOCYTES NFR BLD AUTO: 10.8 % (ref 0–12)
NEUTROPHILS # BLD AUTO: 5.09 10*3/MM3 (ref 2–8.6)
NEUTROPHILS NFR BLD AUTO: 67.2 % (ref 37–80)
PLATELET # BLD AUTO: 174 10*3/MM3 (ref 150–450)
PMV BLD AUTO: 9.4 FL (ref 8–12)
POTASSIUM BLD-SCNC: 3.9 MMOL/L (ref 3.5–5.1)
RBC # BLD AUTO: 4.73 10*6/MM3 (ref 4.37–5.74)
SODIUM BLD-SCNC: 139 MMOL/L (ref 137–145)
WBC NRBC COR # BLD: 7.57 10*3/MM3 (ref 3.2–9.8)

## 2017-10-08 PROCEDURE — 85025 COMPLETE CBC W/AUTO DIFF WBC: CPT | Performed by: FAMILY MEDICINE

## 2017-10-08 PROCEDURE — 80048 BASIC METABOLIC PNL TOTAL CA: CPT | Performed by: FAMILY MEDICINE

## 2017-10-08 PROCEDURE — G0378 HOSPITAL OBSERVATION PER HR: HCPCS

## 2017-10-08 PROCEDURE — 82962 GLUCOSE BLOOD TEST: CPT

## 2017-10-08 PROCEDURE — 63710000001 INSULIN ASPART PER 5 UNITS: Performed by: FAMILY MEDICINE

## 2017-10-08 PROCEDURE — 25010000002 MORPHINE PER 10 MG: Performed by: FAMILY MEDICINE

## 2017-10-08 RX ADMIN — MORPHINE SULFATE 2 MG: 10 INJECTION INTRAVENOUS at 06:46

## 2017-10-08 RX ADMIN — INSULIN ASPART 2 UNITS: 100 INJECTION, SOLUTION INTRAVENOUS; SUBCUTANEOUS at 20:45

## 2017-10-08 RX ADMIN — ACETAMINOPHEN 650 MG: 325 TABLET ORAL at 09:27

## 2017-10-08 RX ADMIN — RANOLAZINE 1000 MG: 500 TABLET, FILM COATED, EXTENDED RELEASE ORAL at 20:44

## 2017-10-08 RX ADMIN — SERTRALINE HYDROCHLORIDE 25 MG: 25 TABLET ORAL at 08:32

## 2017-10-08 RX ADMIN — INSULIN ASPART 2 UNITS: 100 INJECTION, SOLUTION INTRAVENOUS; SUBCUTANEOUS at 16:53

## 2017-10-08 RX ADMIN — METOPROLOL SUCCINATE 100 MG: 100 TABLET, EXTENDED RELEASE ORAL at 20:43

## 2017-10-08 RX ADMIN — DOCUSATE SODIUM 100 MG: 100 CAPSULE, LIQUID FILLED ORAL at 16:56

## 2017-10-08 RX ADMIN — METFORMIN HYDROCHLORIDE 1000 MG: 500 TABLET ORAL at 16:55

## 2017-10-08 RX ADMIN — RANOLAZINE 1000 MG: 500 TABLET, FILM COATED, EXTENDED RELEASE ORAL at 08:32

## 2017-10-08 RX ADMIN — INSULIN ASPART 2 UNITS: 100 INJECTION, SOLUTION INTRAVENOUS; SUBCUTANEOUS at 11:53

## 2017-10-08 RX ADMIN — CLOPIDOGREL BISULFATE 75 MG: 75 TABLET ORAL at 20:44

## 2017-10-08 RX ADMIN — LISINOPRIL 40 MG: 40 TABLET ORAL at 06:08

## 2017-10-08 RX ADMIN — MORPHINE SULFATE 2 MG: 10 INJECTION INTRAVENOUS at 16:53

## 2017-10-08 RX ADMIN — TRAZODONE HYDROCHLORIDE 300 MG: 150 TABLET ORAL at 20:41

## 2017-10-08 RX ADMIN — INSULIN ASPART 6 UNITS: 100 INJECTION, SOLUTION INTRAVENOUS; SUBCUTANEOUS at 06:58

## 2017-10-08 RX ADMIN — ISOSORBIDE MONONITRATE 120 MG: 60 TABLET, EXTENDED RELEASE ORAL at 06:09

## 2017-10-08 RX ADMIN — RIVAROXABAN 20 MG: 10 TABLET, FILM COATED ORAL at 08:33

## 2017-10-08 RX ADMIN — DOCUSATE SODIUM 100 MG: 100 CAPSULE, LIQUID FILLED ORAL at 08:33

## 2017-10-08 RX ADMIN — PANTOPRAZOLE SODIUM 40 MG: 40 TABLET, DELAYED RELEASE ORAL at 20:43

## 2017-10-08 RX ADMIN — ATORVASTATIN CALCIUM 20 MG: 20 TABLET, FILM COATED ORAL at 20:43

## 2017-10-08 RX ADMIN — PRAMIPEXOLE DIHYDROCHLORIDE 2 MG: 1 TABLET ORAL at 20:42

## 2017-10-08 RX ADMIN — METFORMIN HYDROCHLORIDE 1000 MG: 500 TABLET ORAL at 08:32

## 2017-10-08 RX ADMIN — AMLODIPINE BESYLATE 10 MG: 10 TABLET ORAL at 20:44

## 2017-10-08 NOTE — PLAN OF CARE
Problem: Patient Care Overview (Adult)  Goal: Plan of Care Review  Outcome: Ongoing (interventions implemented as appropriate)    10/08/17 1705   Coping/Psychosocial Response Interventions   Plan Of Care Reviewed With patient   Patient Care Overview   Progress no change       Goal: Adult Individualization and Mutuality  Outcome: Ongoing (interventions implemented as appropriate)  Goal: Discharge Needs Assessment  Outcome: Ongoing (interventions implemented as appropriate)    Problem: Acute Coronary Syndrome (ACS) (Adult)  Goal: Signs and Symptoms of Listed Potential Problems Will be Absent or Manageable (Acute Coronary Syndrome)  Outcome: Ongoing (interventions implemented as appropriate)    Problem: Pain, Chronic (Adult)  Goal: Identify Related Risk Factors and Signs and Symptoms  Outcome: Outcome(s) achieved Date Met:  10/08/17  Goal: Acceptable Pain Control/Comfort Level  Outcome: Ongoing (interventions implemented as appropriate)

## 2017-10-08 NOTE — PROGRESS NOTES
[facility] Medicine Services  INPATIENT PROGRESS NOTE    Length of Stay: 0  Date of Admission: 10/5/2017  Primary Care Physician: LALA Barajas    Subjective   Chief Complaint: Chest pain  HPI   37 y.o. male  with a past medical history significant for atherosclerotic coronary artery disease, previous PTCA and stenting of the left anterior descending artery done in 2016, that presented with Chest pain and continues to have pain. He complained of Chest pain again last night requiring morphine for resolution and still feels more SOA with exertion. Currently he is without Chest pain or pressure and denies dyspnea  Review of Systems   Constitutional: Negative for appetite change, chills and fever.   HENT: Negative for congestion, ear pain, rhinorrhea, sneezing and sore throat.    Respiratory: Positive for shortness of breath. Negative for cough.    Cardiovascular: Positive for chest pain Last night. Negative for palpitations and leg swelling.   Gastrointestinal: Negative for diarrhea, nausea and vomiting.   Endocrine: Negative for polyphagia and polyuria.   Musculoskeletal: Negative for back pain and neck pain.   Skin: Negative for color change and rash.   Neurological: Negative for dizziness, syncope and weakness.   Psychiatric/Behavioral: Negative for agitation, confusion and dysphoric mood.   All pertinent negatives and positives are as above. All other systems have been reviewed and are negative unless otherwise stated.     Objective    Temp:  [96.6 °F (35.9 °C)-97.9 °F (36.6 °C)] 97.5 °F (36.4 °C)  Heart Rate:  [63-71] 66  Resp:  [18] 18  BP: (120-152)/(58-78) 121/58  Physical Exam  Constitutional: He is oriented to person, place, and time. He appears well-developed and well-nourished.   Cardiovascular: Normal rate, regular rhythm and normal heart sounds.  Exam reveals no gallop and no friction rub.    No murmur heard.  Pulmonary/Chest: Effort normal and breath sounds  normal. No respiratory distress. He has no wheezes. He has no rales.   Abdominal: Soft. Bowel sounds are normal. There is no tenderness.   Musculoskeletal: Normal range of motion. He exhibits no edema.   Neurological: He is alert and oriented to person, place, and time.   Skin: Skin is warm and dry.   Psychiatric: He has a normal mood and affect. His behavior is normal.   Vitals reviewed.        Results Review:  I have reviewed the labs, radiology results, and diagnostic studies.    Laboratory Data:   Lab Results (last 24 hours)     Procedure Component Value Units Date/Time    POC Glucose Fingerstick [[id number]]  (Abnormal) Collected:  10/07/17 1002    Specimen:  Blood Updated:  10/07/17 1020     Glucose 173 (H) mg/dL       RN NotifiedMeter: [id number]: [id number] [employer] NICOLA       POC Glucose Fingerstick [[id number]]  (Abnormal) Collected:  10/07/17 1614    Specimen:  Blood Updated:  10/07/17 1710     Glucose 219 (H) mg/dL       RN NotifiedMeter: [id number]: [id number] [employer]       POC Glucose Fingerstick [[id number]]  (Abnormal) Collected:  10/07/17 1950    Specimen:  Blood Updated:  10/07/17 2133     Glucose 251 (H) mg/dL       RN NotifiedMeter: [id number]: [id number] ANN-MARIE GOMEZ       CBC & Differential [[id number]] Collected:  10/08/17 0513    Specimen:  Blood Updated:  10/08/17 0648    Narrative:       The following orders were created for panel order CBC & Differential.  Procedure                               Abnormality         Status                     ---------                               -----------         ------                     CBC Auto Differential[[id number]]        Abnormal            Final result                 Please view results for these tests on the individual orders.    CBC Auto Differential [[id number]]  (Abnormal) Collected:  10/08/17 0513    Specimen:  Blood Updated:  10/08/17 0648     WBC 7.57 10*3/mm3      RBC 4.73 10*6/mm3      Hemoglobin  12.7 (L) g/dL      Hematocrit 39.6 %      MCV 83.7 fL      MCH 26.8 pg      MCHC 32.1 g/dL      RDW 15.0 (H) %      RDW-SD 46.0 (H) fl      MPV 9.4 fL      Platelets 174 10*3/mm3      Neutrophil % 67.2 %      Lymphocyte % 16.2 %      Monocyte % 10.8 %      Eosinophil % 4.0 %      Basophil % 0.3 %      Immature Grans % 1.5 (H) %      Neutrophils, Absolute 5.09 10*3/mm3      Lymphocytes, Absolute 1.23 10*3/mm3      Monocytes, Absolute 0.82 10*3/mm3      Eosinophils, Absolute 0.30 10*3/mm3      Basophils, Absolute 0.02 10*3/mm3      Immature Grans, Absolute 0.11 (H) 10*3/mm3     Basic Metabolic Panel [[id number]]  (Abnormal) Collected:  10/08/17 0513    Specimen:  Blood Updated:  10/08/17 0657     Glucose 226 (H) mg/dL      BUN 8 mg/dL      Creatinine 0.72 mg/dL      Sodium 139 mmol/L      Potassium 3.9 mmol/L      Chloride 96 mmol/L      CO2 32.0 (H) mmol/L      Calcium 8.7 mg/dL      eGFR Non African Amer 122 mL/min/1.73      BUN/Creatinine Ratio 11.1     Anion Gap 11.0 mmol/L     POC Glucose Fingerstick [[id number]]  (Abnormal) Collected:  10/08/17 0616    Specimen:  Blood Updated:  10/08/17 0705     Glucose 251 (H) mg/dL       RN NotifiedMeter: [id number]: [id number] ANN-MARIE GOMEZ             Culture Data:   No results found for: BLOODCX  No results found for: URINECX  No results found for: RESPCX  No results found for: WOUNDCX  No results found for: STOOLCX  No components found for: BODYFLD    Radiology Data:   Imaging Results (last 24 hours)     ** No results found for the last 24 hours. **          I have reviewed the patient current medications.     Assessment/Plan     Hospital Problem List     Chest pain          Plan: Cardiology on consult. Pt still having symptoms. Will continue to monitor given recurrent symptoms. Will continue current treatment and discuss with cardiology. Remains on Statin, plavix, nitrates, Metoprolol and Ranexa. Continue Morphine prn pain episodes. CT angio reviewed with  patient and family.          Antony Frost DO   10/08/17   9:14 AM

## 2017-10-09 LAB
ANION GAP SERPL CALCULATED.3IONS-SCNC: 11 MMOL/L (ref 5–15)
BASOPHILS # BLD AUTO: 0.01 10*3/MM3 (ref 0–0.2)
BASOPHILS NFR BLD AUTO: 0.1 % (ref 0–2)
BUN BLD-MCNC: 10 MG/DL (ref 7–21)
BUN/CREAT SERPL: 13.9 (ref 7–25)
CALCIUM SPEC-SCNC: 8.6 MG/DL (ref 8.4–10.2)
CHLORIDE SERPL-SCNC: 96 MMOL/L (ref 95–110)
CO2 SERPL-SCNC: 31 MMOL/L (ref 22–31)
CREAT BLD-MCNC: 0.72 MG/DL (ref 0.7–1.3)
DEPRECATED RDW RBC AUTO: 45.3 FL (ref 35.1–43.9)
EOSINOPHIL # BLD AUTO: 0.22 10*3/MM3 (ref 0–0.7)
EOSINOPHIL NFR BLD AUTO: 2.7 % (ref 0–7)
ERYTHROCYTE [DISTWIDTH] IN BLOOD BY AUTOMATED COUNT: 15.1 % (ref 11.5–14.5)
GFR SERPL CREATININE-BSD FRML MDRD: 122 ML/MIN/1.73 (ref 60–162)
GLUCOSE BLD-MCNC: 163 MG/DL (ref 60–100)
GLUCOSE BLDC GLUCOMTR-MCNC: 138 MG/DL (ref 70–130)
GLUCOSE BLDC GLUCOMTR-MCNC: 157 MG/DL (ref 70–130)
GLUCOSE BLDC GLUCOMTR-MCNC: 224 MG/DL (ref 70–130)
GLUCOSE BLDC GLUCOMTR-MCNC: 244 MG/DL (ref 70–130)
HCT VFR BLD AUTO: 37.2 % (ref 39–49)
HGB BLD-MCNC: 12.7 G/DL (ref 13.7–17.3)
IMM GRANULOCYTES # BLD: 0.11 10*3/MM3 (ref 0–0.02)
IMM GRANULOCYTES NFR BLD: 1.3 % (ref 0–0.5)
LYMPHOCYTES # BLD AUTO: 1.23 10*3/MM3 (ref 0.6–4.2)
LYMPHOCYTES NFR BLD AUTO: 15.1 % (ref 10–50)
MCH RBC QN AUTO: 28 PG (ref 26.5–34)
MCHC RBC AUTO-ENTMCNC: 34.1 G/DL (ref 31.5–36.3)
MCV RBC AUTO: 81.9 FL (ref 80–98)
MONOCYTES # BLD AUTO: 0.81 10*3/MM3 (ref 0–0.9)
MONOCYTES NFR BLD AUTO: 9.9 % (ref 0–12)
NEUTROPHILS # BLD AUTO: 5.78 10*3/MM3 (ref 2–8.6)
NEUTROPHILS NFR BLD AUTO: 70.9 % (ref 37–80)
PLATELET # BLD AUTO: 180 10*3/MM3 (ref 150–450)
PMV BLD AUTO: 8.9 FL (ref 8–12)
POTASSIUM BLD-SCNC: 4.2 MMOL/L (ref 3.5–5.1)
RBC # BLD AUTO: 4.54 10*6/MM3 (ref 4.37–5.74)
SODIUM BLD-SCNC: 138 MMOL/L (ref 137–145)
WBC NRBC COR # BLD: 8.16 10*3/MM3 (ref 3.2–9.8)

## 2017-10-09 PROCEDURE — 94799 UNLISTED PULMONARY SVC/PX: CPT

## 2017-10-09 PROCEDURE — 82962 GLUCOSE BLOOD TEST: CPT

## 2017-10-09 PROCEDURE — 63710000001 PREDNISONE PER 1 MG: Performed by: FAMILY MEDICINE

## 2017-10-09 PROCEDURE — 80048 BASIC METABOLIC PNL TOTAL CA: CPT | Performed by: FAMILY MEDICINE

## 2017-10-09 PROCEDURE — 25010000002 MORPHINE PER 10 MG: Performed by: FAMILY MEDICINE

## 2017-10-09 PROCEDURE — 63710000001 INSULIN ASPART PER 5 UNITS: Performed by: FAMILY MEDICINE

## 2017-10-09 PROCEDURE — 94760 N-INVAS EAR/PLS OXIMETRY 1: CPT

## 2017-10-09 PROCEDURE — 85025 COMPLETE CBC W/AUTO DIFF WBC: CPT | Performed by: FAMILY MEDICINE

## 2017-10-09 PROCEDURE — 94640 AIRWAY INHALATION TREATMENT: CPT

## 2017-10-09 RX ORDER — MORPHINE SULFATE 10 MG/ML
2 INJECTION INTRAMUSCULAR; INTRAVENOUS; SUBCUTANEOUS EVERY 4 HOURS PRN
Status: DISCONTINUED | OUTPATIENT
Start: 2017-10-09 | End: 2017-10-10 | Stop reason: HOSPADM

## 2017-10-09 RX ORDER — PREDNISONE 20 MG/1
40 TABLET ORAL DAILY
Status: DISCONTINUED | OUTPATIENT
Start: 2017-10-09 | End: 2017-10-10 | Stop reason: HOSPADM

## 2017-10-09 RX ORDER — IPRATROPIUM BROMIDE AND ALBUTEROL SULFATE 2.5; .5 MG/3ML; MG/3ML
3 SOLUTION RESPIRATORY (INHALATION)
Status: DISCONTINUED | OUTPATIENT
Start: 2017-10-09 | End: 2017-10-10 | Stop reason: HOSPADM

## 2017-10-09 RX ORDER — SODIUM CHLORIDE 0.9 % (FLUSH) 0.9 %
SYRINGE (ML) INJECTION
Status: COMPLETED
Start: 2017-10-09 | End: 2017-10-09

## 2017-10-09 RX ADMIN — MORPHINE SULFATE 2 MG: 10 INJECTION INTRAVENOUS at 04:33

## 2017-10-09 RX ADMIN — MORPHINE SULFATE 2 MG: 10 INJECTION INTRAVENOUS at 23:08

## 2017-10-09 RX ADMIN — DOCUSATE SODIUM 100 MG: 100 CAPSULE, LIQUID FILLED ORAL at 07:28

## 2017-10-09 RX ADMIN — PREDNISONE 40 MG: 20 TABLET ORAL at 15:10

## 2017-10-09 RX ADMIN — PANTOPRAZOLE SODIUM 40 MG: 40 TABLET, DELAYED RELEASE ORAL at 20:51

## 2017-10-09 RX ADMIN — INSULIN ASPART 4 UNITS: 100 INJECTION, SOLUTION INTRAVENOUS; SUBCUTANEOUS at 20:51

## 2017-10-09 RX ADMIN — ATORVASTATIN CALCIUM 20 MG: 20 TABLET, FILM COATED ORAL at 20:51

## 2017-10-09 RX ADMIN — RANOLAZINE 1000 MG: 500 TABLET, FILM COATED, EXTENDED RELEASE ORAL at 07:27

## 2017-10-09 RX ADMIN — METFORMIN HYDROCHLORIDE 1000 MG: 500 TABLET ORAL at 17:37

## 2017-10-09 RX ADMIN — MORPHINE SULFATE 2 MG: 10 INJECTION INTRAVENOUS at 18:19

## 2017-10-09 RX ADMIN — ONDANSETRON 4 MG: 4 TABLET, ORALLY DISINTEGRATING ORAL at 05:44

## 2017-10-09 RX ADMIN — METFORMIN HYDROCHLORIDE 1000 MG: 500 TABLET ORAL at 07:27

## 2017-10-09 RX ADMIN — ISOSORBIDE MONONITRATE 120 MG: 60 TABLET, EXTENDED RELEASE ORAL at 06:13

## 2017-10-09 RX ADMIN — SODIUM CHLORIDE, PRESERVATIVE FREE: 5 INJECTION INTRAVENOUS at 05:39

## 2017-10-09 RX ADMIN — IPRATROPIUM BROMIDE AND ALBUTEROL SULFATE 3 ML: 2.5; .5 SOLUTION RESPIRATORY (INHALATION) at 19:52

## 2017-10-09 RX ADMIN — INSULIN ASPART 4 UNITS: 100 INJECTION, SOLUTION INTRAVENOUS; SUBCUTANEOUS at 17:37

## 2017-10-09 RX ADMIN — IPRATROPIUM BROMIDE AND ALBUTEROL SULFATE 3 ML: 2.5; .5 SOLUTION RESPIRATORY (INHALATION) at 15:33

## 2017-10-09 RX ADMIN — PRAMIPEXOLE DIHYDROCHLORIDE 2 MG: 1 TABLET ORAL at 20:51

## 2017-10-09 RX ADMIN — RIVAROXABAN 20 MG: 10 TABLET, FILM COATED ORAL at 07:28

## 2017-10-09 RX ADMIN — RANOLAZINE 1000 MG: 500 TABLET, FILM COATED, EXTENDED RELEASE ORAL at 20:51

## 2017-10-09 RX ADMIN — DOCUSATE SODIUM 100 MG: 100 CAPSULE, LIQUID FILLED ORAL at 17:37

## 2017-10-09 RX ADMIN — SERTRALINE HYDROCHLORIDE 25 MG: 25 TABLET ORAL at 07:28

## 2017-10-09 RX ADMIN — MORPHINE SULFATE 2 MG: 10 INJECTION INTRAVENOUS at 09:19

## 2017-10-09 RX ADMIN — INSULIN ASPART 2 UNITS: 100 INJECTION, SOLUTION INTRAVENOUS; SUBCUTANEOUS at 07:27

## 2017-10-09 RX ADMIN — METOPROLOL SUCCINATE 100 MG: 100 TABLET, EXTENDED RELEASE ORAL at 20:51

## 2017-10-09 RX ADMIN — TRAZODONE HYDROCHLORIDE 300 MG: 150 TABLET ORAL at 20:51

## 2017-10-09 RX ADMIN — LISINOPRIL 40 MG: 40 TABLET ORAL at 06:13

## 2017-10-09 RX ADMIN — CLOPIDOGREL BISULFATE 75 MG: 75 TABLET ORAL at 20:51

## 2017-10-09 RX ADMIN — AMLODIPINE BESYLATE 10 MG: 10 TABLET ORAL at 20:51

## 2017-10-09 NOTE — PROGRESS NOTES
"  [facility] Medicine Services  INPATIENT PROGRESS NOTE    Length of Stay: 0  Date of Admission: 10/5/2017  Primary Care Physician: LALA Barajas    Subjective   Chief Complaint: SOA  HPI:  (copied from 's H and P)  Patient presents to ED with complaints of pressure like chest pain that started earlier today. He states that he feel as if someone is \"sitting on his chest\". Started earlier this morning, and lasted for about 30-40 minutes. Stated symptoms associated with SOA and sweating. No palpitations, dizziness, near syncope. No radiation of pain. No alleviating/exacerbating factors.     October 10, 2017: Patient is still short of air.  No acute events overnight.   does not use home oxygen but here he is requiring 93% oxygen saturation with 3 L of oxygen.    Review of Systems   Constitutional: Negative for activity change, appetite change, fatigue and fever.   HENT: Negative for ear pain and sore throat.    Eyes: Negative for pain and visual disturbance.   Respiratory: Negative for cough and shortness of breath.    Cardiovascular: Negative for chest pain and palpitations.   Gastrointestinal: Negative for abdominal pain and nausea.   Endocrine: Negative for cold intolerance and heat intolerance.   Genitourinary: Negative for difficulty urinating and dysuria.   Musculoskeletal: Negative for arthralgias and gait problem.   Skin: Negative for color change and rash.   Neurological: Negative for dizziness, weakness and headaches.   Hematological: Negative for adenopathy. Does not bruise/bleed easily.   Psychiatric/Behavioral: Negative for agitation, confusion and sleep disturbance.        All pertinent negatives and positives are as above. All other systems have been reviewed and are negative unless otherwise stated.     Objective    Temp:  [96.2 °F (35.7 °C)-97.6 °F (36.4 °C)] 97.4 °F (36.3 °C)  Heart Rate:  [62-74] 62  Resp:  [18-20] 18  BP: (119-156)/(56-72) " 124/56    Physical Exam   Constitutional: He is oriented to person, place, and time. He appears well-developed and well-nourished. No distress.   HENT:   Head: Normocephalic and atraumatic.   Right Ear: External ear normal.   Left Ear: External ear normal.   Mouth/Throat: Oropharynx is clear and moist.   Eyes: Conjunctivae and EOM are normal. Pupils are equal, round, and reactive to light. Left eye exhibits no discharge. No scleral icterus.   Neck: No tracheal deviation present. No thyromegaly present.   Cardiovascular: Normal rate, regular rhythm and normal heart sounds.  Exam reveals no gallop and no friction rub.    No murmur heard.  Pulmonary/Chest: Effort normal and breath sounds normal. No stridor. No respiratory distress. He has no wheezes. He has no rales.   Abdominal: Soft. Bowel sounds are normal. He exhibits no distension. There is no tenderness. There is no rebound.   Musculoskeletal: He exhibits no edema, tenderness or deformity.   Neurological: He is alert and oriented to person, place, and time. Coordination normal.   Skin: Skin is warm and dry. No rash noted. No erythema. No pallor.   Psychiatric: He has a normal mood and affect. His behavior is normal. Thought content normal.           amLODIPine 10 mg Oral Nightly   atorvastatin 20 mg Oral Nightly   clopidogrel 75 mg Oral Nightly   docusate sodium 100 mg Oral BID   insulin aspart 0-9 Units Subcutaneous 4x Daily AC & at Bedtime   isosorbide mononitrate 120 mg Oral QAM   lisinopril 40 mg Oral QAM   metFORMIN 1,000 mg Oral BID With Meals   metoprolol succinate  mg Oral Nightly   pantoprazole 40 mg Oral Nightly   pramipexole 2 mg Oral Nightly   ranolazine 1,000 mg Oral Q12H   rivaroxaban 20 mg Oral Daily   sertraline 25 mg Oral Daily   traZODone 300 mg Oral Nightly         Results Review:  I have reviewed the labs, radiology results, and diagnostic studies.    Laboratory Data:     Results from last 7 days  Lab Units 10/09/17  0524 10/08/17  0513  10/07/17  0537  10/05/17  0533   SODIUM mmol/L 138 139 138  < > 138   POTASSIUM mmol/L 4.2 3.9 3.8  < > 3.6   CHLORIDE mmol/L 96 96 98  < > 100   CO2 mmol/L 31.0 32.0* 29.0  < > 28.0   BUN mg/dL 10 8 10  < > 7   CREATININE mg/dL 0.72 0.72 0.71  < > 0.70   GLUCOSE mg/dL 163* 226* 191*  < > 244*   CALCIUM mg/dL 8.6 8.7 9.0  < > 8.8   BILIRUBIN mg/dL  --   --   --   --  0.8   ALK PHOS U/L  --   --   --   --  67   ALT (SGPT) U/L  --   --   --   --  40   AST (SGOT) U/L  --   --   --   --  24   ANION GAP mmol/L 11.0 11.0 11.0  < > 10.0   < > = values in this interval not displayed.  Estimated Creatinine Clearance: 249.4 mL/min (by C-G formula based on Cr of 0.72).            Results from last 7 days  Lab Units 10/09/17  0524 10/08/17  0513 10/07/17  0537 10/06/17  0526 10/05/17  0533   WBC 10*3/mm3 8.16 7.57 6.57 8.51 7.10   HEMOGLOBIN g/dL 12.7* 12.7* 12.4* 13.2* 12.6*   HEMATOCRIT % 37.2* 39.6 37.7* 38.9* 38.1*   PLATELETS 10*3/mm3 180 174 160 181 182           Culture Data:   No results found for: BLOODCX  No results found for: URINECX  No results found for: RESPCX  No results found for: WOUNDCX  No results found for: STOOLCX  No components found for: BODYFLD    Radiology Data:   Imaging Results (last 24 hours)     ** No results found for the last 24 hours. **          I have reviewed the patient current medications.     Assessment/Plan     Hospital Problem List     Chest pain          1. SOA:   ECHO: EF 56 to 60% based on echo done on 07/24/2017; will order new echo.   Troponin: <0.012 *3  D dimer: <270 ( done on 09/04/2017)  CXR: Neg  CT: Will be ordered   SOA secondary to COPD vs Cardiac pain vs Musculoskeletal pain. Patient has a previous history of tobacco smoking and he quit in 2017.  We'll start patient on duo nebs, prednisone.    has been consulted.  Patient is requiring 2 L of oxygen and he does not use oxygen at home.  Pulmonary embolism has been ruled out because patient's d-dimer is less than 270  and he is already on that also.    DVT Prophylaxis: Xarelto    Discharge Planning: I expect patient to be discharged to home in 2-3 days.          This document has been electronically signed by Minesh Gregg MD on October 9, 2017 2:01 PM

## 2017-10-09 NOTE — PLAN OF CARE
Problem: Patient Care Overview (Adult)  Goal: Plan of Care Review  Outcome: Ongoing (interventions implemented as appropriate)  Goal: Adult Individualization and Mutuality  Outcome: Ongoing (interventions implemented as appropriate)  Goal: Discharge Needs Assessment  Outcome: Ongoing (interventions implemented as appropriate)    Problem: Acute Coronary Syndrome (ACS) (Adult)  Goal: Signs and Symptoms of Listed Potential Problems Will be Absent or Manageable (Acute Coronary Syndrome)  Outcome: Ongoing (interventions implemented as appropriate)    Problem: Pain, Chronic (Adult)  Goal: Acceptable Pain Control/Comfort Level  Outcome: Ongoing (interventions implemented as appropriate)

## 2017-10-10 VITALS
SYSTOLIC BLOOD PRESSURE: 123 MMHG | RESPIRATION RATE: 18 BRPM | OXYGEN SATURATION: 92 % | HEART RATE: 69 BPM | HEIGHT: 70 IN | BODY MASS INDEX: 45.1 KG/M2 | DIASTOLIC BLOOD PRESSURE: 71 MMHG | WEIGHT: 315 LBS | TEMPERATURE: 98.2 F

## 2017-10-10 LAB
ANION GAP SERPL CALCULATED.3IONS-SCNC: 10 MMOL/L (ref 5–15)
BASOPHILS # BLD AUTO: 0.02 10*3/MM3 (ref 0–0.2)
BASOPHILS NFR BLD AUTO: 0.2 % (ref 0–2)
BUN BLD-MCNC: 9 MG/DL (ref 7–21)
BUN/CREAT SERPL: 12.7 (ref 7–25)
CALCIUM SPEC-SCNC: 9.1 MG/DL (ref 8.4–10.2)
CHLORIDE SERPL-SCNC: 96 MMOL/L (ref 95–110)
CO2 SERPL-SCNC: 31 MMOL/L (ref 22–31)
CREAT BLD-MCNC: 0.71 MG/DL (ref 0.7–1.3)
DEPRECATED RDW RBC AUTO: 44.9 FL (ref 35.1–43.9)
EOSINOPHIL # BLD AUTO: 0.19 10*3/MM3 (ref 0–0.7)
EOSINOPHIL NFR BLD AUTO: 2.3 % (ref 0–7)
ERYTHROCYTE [DISTWIDTH] IN BLOOD BY AUTOMATED COUNT: 14.8 % (ref 11.5–14.5)
GFR SERPL CREATININE-BSD FRML MDRD: 124 ML/MIN/1.73 (ref 70–162)
GLUCOSE BLD-MCNC: 137 MG/DL (ref 60–100)
GLUCOSE BLDC GLUCOMTR-MCNC: 134 MG/DL (ref 70–130)
GLUCOSE BLDC GLUCOMTR-MCNC: 185 MG/DL (ref 70–130)
HCT VFR BLD AUTO: 37.9 % (ref 39–49)
HGB BLD-MCNC: 12.4 G/DL (ref 13.7–17.3)
IMM GRANULOCYTES # BLD: 0.1 10*3/MM3 (ref 0–0.02)
IMM GRANULOCYTES NFR BLD: 1.2 % (ref 0–0.5)
LYMPHOCYTES # BLD AUTO: 1.36 10*3/MM3 (ref 0.6–4.2)
LYMPHOCYTES NFR BLD AUTO: 16.2 % (ref 10–50)
MCH RBC QN AUTO: 27.3 PG (ref 26.5–34)
MCHC RBC AUTO-ENTMCNC: 32.7 G/DL (ref 31.5–36.3)
MCV RBC AUTO: 83.3 FL (ref 80–98)
MONOCYTES # BLD AUTO: 0.68 10*3/MM3 (ref 0–0.9)
MONOCYTES NFR BLD AUTO: 8.1 % (ref 0–12)
NEUTROPHILS # BLD AUTO: 6.07 10*3/MM3 (ref 2–8.6)
NEUTROPHILS NFR BLD AUTO: 72 % (ref 37–80)
PLATELET # BLD AUTO: 163 10*3/MM3 (ref 150–450)
PMV BLD AUTO: 9.3 FL (ref 8–12)
POTASSIUM BLD-SCNC: 3.8 MMOL/L (ref 3.5–5.1)
RBC # BLD AUTO: 4.55 10*6/MM3 (ref 4.37–5.74)
SODIUM BLD-SCNC: 137 MMOL/L (ref 137–145)
WBC NRBC COR # BLD: 8.42 10*3/MM3 (ref 3.2–9.8)

## 2017-10-10 PROCEDURE — 93010 ELECTROCARDIOGRAM REPORT: CPT | Performed by: INTERNAL MEDICINE

## 2017-10-10 PROCEDURE — 94799 UNLISTED PULMONARY SVC/PX: CPT

## 2017-10-10 PROCEDURE — 93005 ELECTROCARDIOGRAM TRACING: CPT | Performed by: INTERNAL MEDICINE

## 2017-10-10 PROCEDURE — 94760 N-INVAS EAR/PLS OXIMETRY 1: CPT

## 2017-10-10 PROCEDURE — 82962 GLUCOSE BLOOD TEST: CPT

## 2017-10-10 PROCEDURE — 85025 COMPLETE CBC W/AUTO DIFF WBC: CPT | Performed by: FAMILY MEDICINE

## 2017-10-10 PROCEDURE — 63710000001 INSULIN ASPART PER 5 UNITS: Performed by: FAMILY MEDICINE

## 2017-10-10 PROCEDURE — 80048 BASIC METABOLIC PNL TOTAL CA: CPT | Performed by: FAMILY MEDICINE

## 2017-10-10 PROCEDURE — 63710000001 PREDNISONE PER 1 MG: Performed by: FAMILY MEDICINE

## 2017-10-10 RX ORDER — CLOPIDOGREL BISULFATE 75 MG/1
75 TABLET ORAL NIGHTLY
Qty: 30 TABLET | Refills: 0 | Status: SHIPPED | OUTPATIENT
Start: 2017-10-10 | End: 2017-11-09

## 2017-10-10 RX ORDER — METHYLPREDNISOLONE 4 MG/1
TABLET ORAL
Qty: 1 EACH | Refills: 0 | Status: SHIPPED | OUTPATIENT
Start: 2017-10-10 | End: 2017-11-10

## 2017-10-10 RX ORDER — NITROGLYCERIN 0.4 MG/1
0.4 TABLET SUBLINGUAL
Qty: 25 TABLET | Refills: 6 | Status: SHIPPED | OUTPATIENT
Start: 2017-10-10 | End: 2021-09-29

## 2017-10-10 RX ORDER — ISOSORBIDE MONONITRATE 120 MG/1
120 TABLET, EXTENDED RELEASE ORAL EVERY MORNING
Qty: 30 TABLET | Refills: 0 | Status: SHIPPED | OUTPATIENT
Start: 2017-10-10 | End: 2022-04-12

## 2017-10-10 RX ADMIN — LISINOPRIL 40 MG: 40 TABLET ORAL at 06:12

## 2017-10-10 RX ADMIN — IPRATROPIUM BROMIDE AND ALBUTEROL SULFATE 3 ML: 2.5; .5 SOLUTION RESPIRATORY (INHALATION) at 06:31

## 2017-10-10 RX ADMIN — PREDNISONE 40 MG: 20 TABLET ORAL at 08:46

## 2017-10-10 RX ADMIN — RIVAROXABAN 20 MG: 10 TABLET, FILM COATED ORAL at 08:46

## 2017-10-10 RX ADMIN — INSULIN ASPART 2 UNITS: 100 INJECTION, SOLUTION INTRAVENOUS; SUBCUTANEOUS at 11:00

## 2017-10-10 RX ADMIN — RANOLAZINE 1000 MG: 500 TABLET, FILM COATED, EXTENDED RELEASE ORAL at 08:45

## 2017-10-10 RX ADMIN — METFORMIN HYDROCHLORIDE 1000 MG: 500 TABLET ORAL at 08:46

## 2017-10-10 RX ADMIN — IPRATROPIUM BROMIDE AND ALBUTEROL SULFATE 3 ML: 2.5; .5 SOLUTION RESPIRATORY (INHALATION) at 14:34

## 2017-10-10 RX ADMIN — DOCUSATE SODIUM 100 MG: 100 CAPSULE, LIQUID FILLED ORAL at 08:49

## 2017-10-10 RX ADMIN — IPRATROPIUM BROMIDE AND ALBUTEROL SULFATE 3 ML: 2.5; .5 SOLUTION RESPIRATORY (INHALATION) at 10:47

## 2017-10-10 RX ADMIN — ISOSORBIDE MONONITRATE 120 MG: 60 TABLET, EXTENDED RELEASE ORAL at 06:12

## 2017-10-10 RX ADMIN — SERTRALINE HYDROCHLORIDE 25 MG: 25 TABLET ORAL at 08:46

## 2017-10-10 NOTE — PLAN OF CARE
Problem: Patient Care Overview (Adult)  Goal: Plan of Care Review  Outcome: Ongoing (interventions implemented as appropriate)    10/10/17 0509   Coping/Psychosocial Response Interventions   Plan Of Care Reviewed With patient   Patient Care Overview   Progress no change         Problem: Acute Coronary Syndrome (ACS) (Adult)  Goal: Signs and Symptoms of Listed Potential Problems Will be Absent or Manageable (Acute Coronary Syndrome)  Outcome: Ongoing (interventions implemented as appropriate)    Problem: Pain, Chronic (Adult)  Goal: Acceptable Pain Control/Comfort Level  Outcome: Ongoing (interventions implemented as appropriate)    Problem: Diabetes, Type 2 (Adult)  Goal: Signs and Symptoms of Listed Potential Problems Will be Absent or Manageable (Diabetes, Type 2)  Outcome: Ongoing (interventions implemented as appropriate)

## 2017-10-10 NOTE — DISCHARGE SUMMARY
[facility]  DISCHARGE SUMMARY       Date of Admission: 10/5/2017  Date of Discharge:  10/10/2017  Primary Care Physician: LALA Barajas    Presenting Problem/History of Present Illness:  Chest pain, unspecified type [R07.9]  Chest pain, unspecified type [R07.9]  Chest pain, unspecified type [R07.9]       Final Discharge Diagnoses:  Hospital Problem List     Chest pain          Consults:   Consults     Date and Time Order Name Status Description    10/6/2017 1115 Inpatient Consult to Cardiology      10/5/2017 0937 Hospitalist (on-call MD unless specified)      9/21/2017 1913 Inpatient Consult to Cardiology Completed           Procedures Performed:                 Pertinent Test Results: Xr Chest 2 View    Result Date: 9/21/2017  EXAM:          Radiograph(s), Chest VIEWS:    PA / Lat ; 2     DATE/TIME:  9/21/2017 2:40 PM CDT           INDICATION:    Chest pain         COMPARISON:  CXR: 9/4/17      FINDINGS:         - lines/tubes:    none   - cardiac:         Mild cardiac silhouette enlargement, unchanged.       - mediastinum: contour within normal limits       - lungs:         no focal air space process, pulmonary interstitial edema, nodule(s)/mass           - pleura:         no evidence of  fluid                - osseous:         unremarkable for age                - misc.:        CONCLUSION:    1. No evidence of an acute cardiopulmonary process.                          Electronically signed by:  MIGUEL A Soni MD  9/21/2017 2:41 PM CDT Workstation: [id number]    Xr Chest 1 View    Result Date: 10/5/2017  Exam: AP portable chest INDICATION: Chest pain COMPARISON: 9/21/2017 FINDINGS: AP portable chest. Bony structures are intact. Mild cardiac enlargement. Lungs are clear. No pneumothorax or pleural effusion.     No acute cardiopulmonary abnormality. Electronically signed by:  Brennan Corrigan MD  10/5/2017 5:51 AM CDT Workstation:  [id number]    Ct Angiogram Coronary    Result Date: 10/6/2017  Procedure: CT angiogram coronary with contrast CLINICAL HISTORY: Chest pain COMPARISON: None. Post processing was performed by the radiologist at the [employer] workstation. Serial axial CT images were obtained through the heart at 3 mm thickness without contrast for calcium scoring. 3D images including vessel probing technique were also obtained. Subsequently, following the intravenous administration of 92 ml of Isovue-370, serial axial CT images were obtained through the heart at 0.6 mm thickness utilizing retrospective gating. This exam was performed according to our departmental dose optimization program, which includes automated exposure control, adjustment of the mA and/or KV according to patient size and/or use of iterative reconstruction technique. Full field of view reconstructed images were used for evaluation of the extracardiac tissues. CALCIUM PLAQUE BURDEN: REGION                                         CALCIUM SCORE ([place]) Left Main                                                      15 Right Coronary Artery                                 5 Left Anterior Descending                            56 Circumflex                                                    0 Posterior Descending Artery                       76      Your Calcium Score is 76  This places the patent in the mild or minimal coronary narrowings likely risk for coronary artery disease. CTA OF THE CORONARY ARTERIES: There is grossly suboptimal visualization of the left main, LAD, diagonals, circumflex, and RCA. Left Main: Unable to see the left main coronary artery well enough to evaluate secondary to patient body habitus with associated attenuation artifact and poor contrast bolus. LAD: Unable to see the LAD coronary artery well enough to evaluate secondary to patient body habitus with associated attenuation artifact and poor contrast injection. Mid to distal coronary stent  in place. Unable to evaluate patency due to attenuation artifact and poor contrast bolus. Circumflex: Unable to see the left circumflex coronary artery well enough to evaluate secondary to patient body habitus with associated attenuation artifact and poor contrast bolus. RCA: Unable to see the right coronary artery well enough to evaluate secondary to patient body habitus with associated attenuation artifact and poor contrast bolus. The aortic valve is tricuspid. There is no myocardial bridging. On short axis views, the myocardium is homogeneous in thickness. EXTRACARDIAC SOFT TISSUES: Mediastinum: On the imaging, the ascending and descending aorta are normal in caliber. There is no mediastinal or hilar lymphadenopathy. The pericardium is normal. Lungs: No consolidation or focal nodules are present. There is no pneumothorax or effusion. The pulmonary arteries are normal in appearance. Abdomen: No evidence of hiatal hernia. The imaged liver and spleen are unremarkable. There is no lymphadenopathy in the upper abdomen. Bones: There are no lytic or blastic lesions within the osseous structures. CT FUNCTIONAL ANALYSIS: Ejection Fraction     79 % Diastolic Volume     70 ml Systolic Volume      15 ml Stroke Volume        55 ml Cardiac Output       3.2 L/minute     CONCLUSION: 1.  Patient's calcium score is 76. 2.  Patient's cardiac ejection fraction is 79%. 3.  Nondiagnostic CT angiogram coronary study secondary to patient body habitus with associated attenuation artifact as well as poor contrast bolus. 4.  Referring clinician notified of findings by phone. Electronically signed by:  Alvaro Ojeda MD  10/6/2017 8:24 PM CDT Workstation: [id number]    Ct Maxillofacial With Contrast    Result Date: 9/22/2017  PROCEDURE: CT facial bones with contrast COMPARISON: No comparison HISTORY: Facial pain. Continued sinusitis. Ear infection. TECHNIQUE: Multiple contiguous axial images are obtained through the facial bones after the  intravenous administration of 94 mL Isovue-300. Coronal and sagittal multiplanar reformatted images also reviewed. The dose length product is 751 This exam was performed according to our departmental dose-optimization program, which includes automated exposure control, adjustment of the mA and/or kV according to patient size and/or use of iterative reconstruction technique. FINDINGS: Limited by motion artifact. No acute osseous abnormality. The mandible and temporomandibular joints are intact. The orbital floors and martinez are intact. There is mild mucosal thickening along the floors of both maxillary sinuses, greater on the left. The remaining paranasal sinuses are clear. No air-fluid levels. Mastoid air cells are clear. The middle ears are clear bilaterally. No gross soft tissue abnormality is identified.     1. No acute osseous abnormality. 2. Mild mucosal thickening involving the maxillary sinuses as described above. The remaining paranasal sinuses are clear. No air-fluid levels are identified to suggest acute sinusitis at this time. 3. The mastoid air cells and middle ears are clear. Electronically signed by:  Eduard Gloria MD  9/22/2017 11:04 AM CDT Workstation: [id number]      Chief Complaint on Day of Discharge: Heaviness in chest    Hospital Course:  The patient is a 37 y.o. male who presented to [facility] with chest pain.  Patient has a history of stent placement in LAD. Patient had echo coronary angiogram done which showed that  patient's cardiac ejection fraction is 79%.  Patient had nondiagnostic CT angiogram of coronaries because of patient's body habitus with associated attenuation artifact.  Patient was seen and evaluated by Dr. Anca hill who recommended just from medical management.  They give discharge patient did complain of heaviness in the chest.  I had a conversation with Dr. Sydney hill who said that there is no intervention indicated at this point time because  "patient had negative troponins and no EKG changes.  Dr. Grimes recommends only medical management.  Patient also had d-dimer done which ruled out PE.  Patient is already being treated with Xarelto.  That and discharge patient did not require any oxygen.  He was sitting comfortably in the day he left the hospital.  Patient still has shortness of air and that because of heavy body habitus.     Condition on Discharge:  Stale       Physical Exam on Discharge:  /71 (BP Location: Left arm, Patient Position: Lying)  Pulse 69  Temp 98.2 °F (36.8 °C) (Oral)   Resp 18  Ht 70\" (177.8 cm)  Wt (!) 465 lb 11.2 oz (211 kg)  SpO2 92%  BMI 66.82 kg/m2  Physical Exam   Constitutional: He is oriented to person, place, and time. He appears well-developed and well-nourished. No distress.   HENT:   Head: Normocephalic and atraumatic.   Right Ear: External ear normal.   Left Ear: External ear normal.   Mouth/Throat: Oropharynx is clear and moist.   Eyes: Conjunctivae and EOM are normal. Pupils are equal, round, and reactive to light. Left eye exhibits no discharge. No scleral icterus.   Neck: No tracheal deviation present. No thyromegaly present.   Cardiovascular: Normal rate, regular rhythm and normal heart sounds.  Exam reveals no gallop and no friction rub.    No murmur heard.  Pulmonary/Chest: Effort normal and breath sounds normal. No stridor. No respiratory distress. He has no wheezes. He has no rales.   Abdominal: Soft. Bowel sounds are normal. He exhibits no distension. There is no tenderness. There is no rebound.   Musculoskeletal: He exhibits no edema, tenderness or deformity.   Neurological: He is alert and oriented to person, place, and time.   Skin: Skin is warm and dry. No rash noted. No erythema. No pallor.   Psychiatric: He has a normal mood and affect. His behavior is normal. Thought content normal.         Discharge Disposition:  Home or Self Care    Discharge Medications:   Yordy Hansen   Home " Medication Instructions [facility]:[id number]    Printed on:10/10/17 8785   Medication Information                      albuterol (PROVENTIL HFA;VENTOLIN HFA) 108 (90 BASE) MCG/ACT inhaler  Inhale 2 puffs Every 4 (Four) Hours As Needed for Wheezing.             amLODIPine (NORVASC) 10 MG tablet  Take 1 tablet by mouth Every Night.             Canagliflozin (INVOKANA) 300 MG tablet  Take 300 mg by mouth Every Morning Before Breakfast for 90 days.             chlorhexidine (PERIDEX) 0.12 % solution  15 cc swish and spit twice a day             clopidogrel (PLAVIX) 75 MG tablet  Take 1 tablet by mouth Every Night for 30 days.             guaiFENesin (MUCINEX) 600 MG 12 hr tablet  Take 1 tablet by mouth 2 (Two) Times a Day.             isosorbide mononitrate (IMDUR) 120 MG 24 hr tablet  Take 1 tablet by mouth Every Morning for 30 days.             lisinopril (PRINIVIL,ZESTRIL) 40 MG tablet  Take 1 tablet by mouth Every Morning.             metFORMIN (GLUCOPHAGE) 1000 MG tablet  Take 1,000 mg by mouth 2 (Two) Times a Day With Meals.             MethylPREDNISolone (MEDROL, ETELVINA,) 4 MG tablet  Take as directed on package instructions.             metoprolol succinate XL (TOPROL-XL) 100 MG 24 hr tablet  Take 1 tablet by mouth Every Night.             MICROLET LANCETS misc  USE TO TEST BLOOD SUGAR EVERY DAY AND AS NEEDED             neomycin-polymyxin-hydrocortisone (CORTISPORIN) 3.5-58082-1 otic suspension               nitroglycerin (NITROSTAT) 0.4 MG SL tablet  Place 1 tablet under the tongue Every 5 (Five) Minutes As Needed for Chest Pain for up to 15 days.             ondansetron ODT (ZOFRAN-ODT) 4 MG disintegrating tablet  Take 1 tablet by mouth Every 6 (Six) Hours As Needed for Nausea or Vomiting.             oxymetazoline (AFRIN) 0.05 % nasal spray  2 sprays into each nostril 2 (Two) Times a Day.             pantoprazole (PROTONIX) 40 MG EC tablet  Take 1 tablet by mouth Every Night.             pramipexole (MIRAPEX) 1 MG  tablet  Take 2 tablets by mouth Every Night. Taking 2mg daily             pravastatin (PRAVACHOL) 80 MG tablet  Take 1 tablet by mouth Every Night.             ranolazine (RANEXA) 1000 MG 12 hr tablet  Take 1 tablet by mouth Every 12 (Twelve) Hours.             rivaroxaban (XARELTO) 20 MG tablet  Take 1 tablet by mouth Daily.             sertraline (ZOLOFT) 25 MG tablet  Take 1 tablet by mouth Daily.             traZODone (DESYREL) 300 MG tablet  Take 1 tablet by mouth Every Night.                 Discharge Diet:  cardiac diet     Activity at Discharge: As tolerated    Discharge Care Plan/Instructions: As above    Follow-up Appointments:   Future Appointments  Date Time Provider Department Center   10/17/2017 9:30 AM Parul Currie MD [facility] None   10/17/2017 11:00 AM Earnest Perez MD [facility] None   1/4/2018 2:00 PM Antony Jenkins MD PhD [facility] None       Test Results Pending at Discharge:           This document has been electronically signed by Minesh Gregg MD on October 10, 2017 2:50 PM        Time: 50 min

## 2017-10-10 NOTE — DISCHARGE INSTRUCTIONS
Stop taking trazodone due to seizures; d/w your primary care doctor about insomnia treatment.   F/U with Ear nose throat doctor for persistent sinusitis

## 2017-10-10 NOTE — PROGRESS NOTES
Cardiology Progress Note:     LOS: 0 days   Patient Care Team:  LALA Barajas as PCP - General (Nurse Practitioner)      Subjective:   Chart reviewed , patient seen and examined. Patient denies any chest pain, shortness of breath palpitation.  Patient has negative troponin though continues to have dull aching chest discomfort.  Patient appears to be comfortable in bed and when questioned complain of having symptoms of chest pain and shortness of breath.  Clearly patient's symptoms of chest discomfort is very atypical.  Patient denies any fever or chill patient denies any hemoptysis hematuria bright red blood per rectum.  Patient denies any paroxysmal nocturnal dyspnea or orthopnea.  Patient troponin has been negative.        Objective:     Objective:  Vitals:    10/09/17 1952   BP:    Pulse: 71   Resp: 20   Temp:    SpO2: 93%       Intake/Output Summary (Last 24 hours) at 10/09/17 2042  Last data filed at 10/09/17 1740   Gross per 24 hour   Intake             1520 ml   Output                0 ml   Net             1520 ml             Physical Exam:   General Appearance:    Alert, oriented, cooperative, in no acute distress   Head:    Normocephalic, atraumatic, without obvious abnormality   Eyes:           JAMAR  Lids and lashes normal, conjunctivae and sclerae normal, no icterus, no pallor   Ears:    Ears appear intact with no abnormalities noted   Throat:   Mucous membranes pink and moist   Neck:   Supple, trachea midline, no carotid bruit, no organomegaly or JVD   Lungs:     Clear to auscultation and percussion, respirations regular, even and Unlabored. No wheezes, rales, rhonchi    Heart:    Regular rhythm and normal rate, normal S1 and S2, no            murmur, no gallop, no rub, no click   Abdomen:     Soft, non-tender, non-distended, no guarding, no rebound tenderness, Normal bowel sounds in all four quadrant, no masses, liver and spleen nonpalpable,    Genitalia:    Deferred   Extremities:   Moves all  extremities well, no edema, no cyanosis, no              Redness, no clubbing   Pulses:   Pulses palpable and equal bilaterally   Skin:   Moist and warm. No bleeding, bruising or rash   Neurologic/Psychiatric:   Alert and oriented to person, place, and time.  Motor, power and tone in upper and lower extremity is grossly intact.  No focal neurological deficits. Normal cognitive function. No psychomotor reaction or tangential thought. No depression, homicidal ideations and suicidal ideations            Results Review:    Lab Results (last 24 hours)     Procedure Component Value Units Date/Time    Basic Metabolic Panel [[id number]]  (Abnormal) Collected:  10/09/17 0524    Specimen:  Blood Updated:  10/09/17 0605     Glucose 163 (H) mg/dL      BUN 10 mg/dL      Creatinine 0.72 mg/dL      Sodium 138 mmol/L      Potassium 4.2 mmol/L      Chloride 96 mmol/L      CO2 31.0 mmol/L      Calcium 8.6 mg/dL      eGFR Non African Amer 122 mL/min/1.73      BUN/Creatinine Ratio 13.9     Anion Gap 11.0 mmol/L     CBC & Differential [[id number]] Collected:  10/09/17 0524    Specimen:  Blood Updated:  10/09/17 0613    Narrative:       The following orders were created for panel order CBC & Differential.  Procedure                               Abnormality         Status                     ---------                               -----------         ------                     CBC Auto Differential[[id number]]        Abnormal            Final result                 Please view results for these tests on the individual orders.    CBC Auto Differential [[id number]]  (Abnormal) Collected:  10/09/17 0524    Specimen:  Blood Updated:  10/09/17 0613     WBC 8.16 10*3/mm3      RBC 4.54 10*6/mm3      Hemoglobin 12.7 (L) g/dL      Hematocrit 37.2 (L) %      MCV 81.9 fL      MCH 28.0 pg      MCHC 34.1 g/dL      RDW 15.1 (H) %      RDW-SD 45.3 (H) fl      MPV 8.9 fL      Platelets 180 10*3/mm3      Neutrophil % 70.9 %      Lymphocyte % 15.1 %       Monocyte % 9.9 %      Eosinophil % 2.7 %      Basophil % 0.1 %      Immature Grans % 1.3 (H) %      Neutrophils, Absolute 5.78 10*3/mm3      Lymphocytes, Absolute 1.23 10*3/mm3      Monocytes, Absolute 0.81 10*3/mm3      Eosinophils, Absolute 0.22 10*3/mm3      Basophils, Absolute 0.01 10*3/mm3      Immature Grans, Absolute 0.11 (H) 10*3/mm3     POC Glucose Fingerstick [[id number]]  (Abnormal) Collected:  10/09/17 0559    Specimen:  Blood Updated:  10/09/17 0645     Glucose 157 (H) mg/dL       RN NotifiedMeter: [id number]: [id number] KAREEN WATKINS       POC Glucose Fingerstick [[id number]]  (Abnormal) Collected:  10/09/17 1016    Specimen:  Blood Updated:  10/09/17 1036     Glucose 138 (H) mg/dL       RN NotifiedMeter: [id number]: [id number] HUSK KANIKA       POC Glucose Fingerstick [[id number]]  (Abnormal) Collected:  10/09/17 1614    Specimen:  Blood Updated:  10/09/17 1632     Glucose 224 (H) mg/dL       RN NotifiedMeter: [id number]: [id number] CHEY PITTMAN       POC Glucose Fingerstick [[id number]]  (Abnormal) Collected:  10/09/17 1927    Specimen:  Blood Updated:  10/09/17 2025     Glucose 244 (H) mg/dL       RN NotifiedMeter: [id number]: [id number] CHEY PITTMAN              Medication Review:   Current Facility-Administered Medications   Medication Dose Route Frequency Provider Last Rate Last Dose   • acetaminophen (TYLENOL) tablet 650 mg  650 mg Oral Q4H PRN Wai Fischer MD   650 mg at 10/08/17 0927   • amLODIPine (NORVASC) tablet 10 mg  10 mg Oral Nightly Wai Fischer MD   10 mg at 10/08/17 2044   • atorvastatin (LIPITOR) tablet 20 mg  20 mg Oral Nightly Wai Fischer MD   20 mg at 10/08/17 2043   • clopidogrel (PLAVIX) tablet 75 mg  75 mg Oral Nightly Wai Fischer MD   75 mg at 10/08/17 2044   • dextrose (D50W) solution 25 g  25 g Intravenous Q15 Min PRN Wai Fischer MD       • dextrose (GLUTOSE) oral gel 15 g  15 g Oral Q15 Min  PRN Wai Fischer MD       • docusate sodium (COLACE) capsule 100 mg  100 mg Oral BID Wai Fischer MD   100 mg at 10/09/17 1737   • glucagon (human recombinant) (GLUCAGEN DIAGNOSTIC) injection 1 mg  1 mg Subcutaneous Q15 Min PRN Wai Fischer MD       • insulin aspart (novoLOG) injection 0-9 Units  0-9 Units Subcutaneous 4x Daily AC & at Bedtime Wai Fischer MD   4 Units at 10/09/17 1737   • ipratropium-albuterol (DUO-NEB) nebulizer solution 3 mL  3 mL Nebulization 4x Daily - RT Minesh Gregg MD   3 mL at 10/09/17 1952   • isosorbide mononitrate (IMDUR) 24 hr tablet 120 mg  120 mg Oral QAM Wai Fischer MD   120 mg at 10/09/17 0613   • lisinopril (PRINIVIL,ZESTRIL) tablet 40 mg  40 mg Oral QAM Wai Fischer MD   40 mg at 10/09/17 0613   • metFORMIN (GLUCOPHAGE) tablet 1,000 mg  1,000 mg Oral BID With Meals Wai Fischer MD   1,000 mg at 10/09/17 1737   • metoprolol succinate XL (TOPROL-XL) 24 hr tablet 100 mg  100 mg Oral Nightly Wai Fischer MD   100 mg at 10/08/17 2043   • Morphine injection 2 mg  2 mg Intravenous Q4H PRN Wai Fischer MD   2 mg at 10/09/17 1819   • nitroglycerin (NITROSTAT) SL tablet 0.4 mg  0.4 mg Sublingual Q5 Min PRN Wai Fischer MD       • ondansetron (ZOFRAN) injection 4 mg  4 mg Intravenous Q6H PRN Wai Fischer MD   4 mg at 10/06/17 0731   • ondansetron ODT (ZOFRAN-ODT) disintegrating tablet 4 mg  4 mg Oral Q6H PRN Wai Fischer MD   4 mg at 10/09/17 0544   • pantoprazole (PROTONIX) EC tablet 40 mg  40 mg Oral Nightly Wai Fischer MD   40 mg at 10/08/17 2043   • pramipexole (MIRAPEX) tablet 2 mg  2 mg Oral Nightly Wai Fischer MD   2 mg at 10/08/17 2042   • predniSONE (DELTASONE) tablet 40 mg  40 mg Oral Daily Minesh Gregg MD   40 mg at 10/09/17 1510   • ranolazine (RANEXA) 12 hr tablet 1,000 mg  1,000 mg Oral Q12H Wai Fischer MD   1,000 mg at 10/09/17 0727   • rivaroxaban  (XARELTO) tablet 20 mg  20 mg Oral Daily Wai Fischer MD   20 mg at 10/09/17 0728   • sertraline (ZOLOFT) tablet 25 mg  25 mg Oral Daily Wai Fischer MD   25 mg at 10/09/17 0728   • sodium chloride 0.9 % flush 1-10 mL  1-10 mL Intravenous PRN Wai Fischer MD       • sodium chloride 0.9 % flush 10 mL  10 mL Intravenous PRN Asim Kumar MD       • traZODone (DESYREL) tablet 300 mg  300 mg Oral Nightly Wai Fischer MD   300 mg at 10/08/17 2041       Assessment and Plan:    Active Problems:    Chest pain  1.  Chest pain.  Patient has history of documented atherosclerotic coronary artery disease with previous PTCA and stenting of the left anterior descending artery.  Patient has negative troponin.  Patient underwent a CTA of the coronaries which was suboptimal secondary to the body habitus.  As the patient resting electrocardiogram did not show any ST-T wave changes and patient has negative troponin at the present time patient would not be subjected to any invasive evaluation from the cardiac standpoint.  Patient would be treated medically.  Would continue with the Ranexa 1000 mg twice a day.  2.  Arterial hypertension. Patient blood pressure is currently well controlled and patient would be continued on the present dose of the antihypertensive medication.  3.  Hypertensive heart disease with mitral and tricuspid regurgitation.  Clinically at the present time patient is not in congestive heart failure.  Patient has been counseled to decrease her salt intake.  4.  History of pulmonary embolism.  Patient is currently anticoagulated with Xarelto.  Patient has not complained of hemoptysis hematuria bright red blood per rectum.  5.  Obesity.  Patient has been counseled on weight reduction lifestyle modification and dietary restriction.  Patient has been explained the risk associated with obesity and the occurrence and progression of atherosclerotic coronary artery disease and peripheral  vascular disease.            Marc Grimes MD  10/09/17  8:42 PM      Time: Time spent on face-to-face interaction 20 minutes    Some of this note may be an electronic transcription/translation of spoken language to printed text. The electronic translation of spoken language may permit erroneous, or at times, nonsensical words or phrases to be inadvertently transcribed; Although I have reviewed the note for such errors, some may still exist.

## 2017-11-10 ENCOUNTER — OFFICE VISIT (OUTPATIENT)
Dept: FAMILY MEDICINE CLINIC | Facility: CLINIC | Age: 37
End: 2017-11-10

## 2017-11-10 VITALS
BODY MASS INDEX: 45.1 KG/M2 | OXYGEN SATURATION: 97 % | TEMPERATURE: 98 F | DIASTOLIC BLOOD PRESSURE: 80 MMHG | SYSTOLIC BLOOD PRESSURE: 130 MMHG | HEART RATE: 92 BPM | WEIGHT: 315 LBS | HEIGHT: 70 IN

## 2017-11-10 DIAGNOSIS — Z79.01 PULMONARY EMBOLISM ON LONG-TERM ANTICOAGULATION THERAPY (HCC): ICD-10-CM

## 2017-11-10 DIAGNOSIS — I25.118 CORONARY ARTERY DISEASE INVOLVING NATIVE CORONARY ARTERY OF NATIVE HEART WITH OTHER FORM OF ANGINA PECTORIS (HCC): ICD-10-CM

## 2017-11-10 DIAGNOSIS — G47.33 OBSTRUCTIVE SLEEP APNEA SYNDROME: Chronic | ICD-10-CM

## 2017-11-10 DIAGNOSIS — E66.01 MORBID OBESITY (HCC): Chronic | ICD-10-CM

## 2017-11-10 DIAGNOSIS — I26.99 PULMONARY EMBOLISM ON LONG-TERM ANTICOAGULATION THERAPY (HCC): ICD-10-CM

## 2017-11-10 DIAGNOSIS — I26.99 OTHER ACUTE PULMONARY EMBOLISM WITHOUT ACUTE COR PULMONALE (HCC): Primary | Chronic | ICD-10-CM

## 2017-11-10 DIAGNOSIS — I10 ESSENTIAL HYPERTENSION: Chronic | ICD-10-CM

## 2017-11-10 DIAGNOSIS — D68.2 FACTOR II DEFICIENCY (HCC): ICD-10-CM

## 2017-11-10 PROCEDURE — 99213 OFFICE O/P EST LOW 20 MIN: CPT | Performed by: FAMILY MEDICINE

## 2017-11-10 RX ORDER — PRAMIPEXOLE DIHYDROCHLORIDE 1 MG/1
2 TABLET ORAL NIGHTLY
Qty: 60 TABLET | Refills: 5 | Status: SHIPPED | OUTPATIENT
Start: 2017-11-10 | End: 2018-06-21 | Stop reason: SDUPTHER

## 2017-11-10 RX ORDER — NITROGLYCERIN 0.4 MG/1
0.4 TABLET SUBLINGUAL
Qty: 100 TABLET | Refills: 12 | Status: SHIPPED | OUTPATIENT
Start: 2017-11-10 | End: 2020-09-29 | Stop reason: SDUPTHER

## 2017-11-10 RX ORDER — CLOPIDOGREL BISULFATE 75 MG/1
75 TABLET ORAL DAILY
Qty: 30 TABLET | Refills: 3 | Status: SHIPPED | OUTPATIENT
Start: 2017-11-10 | End: 2018-02-05 | Stop reason: SDUPTHER

## 2017-11-10 RX ORDER — RANOLAZINE 1000 MG/1
1000 TABLET, EXTENDED RELEASE ORAL EVERY 12 HOURS SCHEDULED
Qty: 60 TABLET | Refills: 5 | Status: SHIPPED | OUTPATIENT
Start: 2017-11-10 | End: 2021-02-02 | Stop reason: SDUPTHER

## 2017-11-10 RX ORDER — CLOPIDOGREL BISULFATE 75 MG/1
75 TABLET ORAL DAILY
COMMUNITY
End: 2017-11-10 | Stop reason: SDUPTHER

## 2017-11-10 RX ORDER — LISINOPRIL 40 MG/1
40 TABLET ORAL EVERY MORNING
Qty: 30 TABLET | Refills: 5 | Status: SHIPPED | OUTPATIENT
Start: 2017-11-10 | End: 2020-09-29 | Stop reason: SDUPTHER

## 2017-11-10 RX ORDER — PRAVASTATIN SODIUM 80 MG/1
80 TABLET ORAL NIGHTLY
Qty: 30 TABLET | Refills: 5 | Status: SHIPPED | OUTPATIENT
Start: 2017-11-10 | End: 2018-08-01 | Stop reason: ALTCHOICE

## 2017-11-10 RX ORDER — PANTOPRAZOLE SODIUM 40 MG/1
40 TABLET, DELAYED RELEASE ORAL NIGHTLY
Qty: 30 TABLET | Refills: 5 | Status: SHIPPED | OUTPATIENT
Start: 2017-11-10 | End: 2020-09-29 | Stop reason: SDUPTHER

## 2017-11-10 RX ORDER — AMLODIPINE BESYLATE 10 MG/1
10 TABLET ORAL NIGHTLY
Qty: 30 TABLET | Refills: 5 | Status: SHIPPED | OUTPATIENT
Start: 2017-11-10 | End: 2020-09-29 | Stop reason: SDUPTHER

## 2017-11-10 RX ORDER — METOPROLOL SUCCINATE 100 MG/1
100 TABLET, EXTENDED RELEASE ORAL NIGHTLY
Qty: 30 TABLET | Refills: 5 | Status: SHIPPED | OUTPATIENT
Start: 2017-11-10 | End: 2017-12-14 | Stop reason: HOSPADM

## 2017-11-10 RX ORDER — SERTRALINE HYDROCHLORIDE 25 MG/1
25 TABLET, FILM COATED ORAL DAILY
Qty: 30 TABLET | Refills: 5 | Status: SHIPPED | OUTPATIENT
Start: 2017-11-10 | End: 2020-09-29

## 2017-11-10 RX ORDER — ALBUTEROL SULFATE 1.25 MG/3ML
1 SOLUTION RESPIRATORY (INHALATION) EVERY 6 HOURS PRN
Qty: 100 VIAL | Refills: 12 | Status: ON HOLD | OUTPATIENT
Start: 2017-11-10 | End: 2017-11-15

## 2017-11-10 RX ORDER — TRAZODONE HYDROCHLORIDE 300 MG/1
300 TABLET ORAL NIGHTLY
Qty: 30 TABLET | Refills: 5 | Status: SHIPPED | OUTPATIENT
Start: 2017-11-10 | End: 2018-06-21 | Stop reason: SDUPTHER

## 2017-11-10 NOTE — PROGRESS NOTES
Subjective   Yordy Hansen is a 37 y.o. male.   Chief Complaint   Patient presents with   • Shortness of Breath     x's 2 months   • Med Refill     also cpap supplies   • Cough       History of Present Illness   Patient with multiple medical problems including obstructive sleep apnea and CAD as well as morbid obesity is in today for reevaluation.  He is requesting a refill of his CPAP supplies.  He also has multiple medications that need to be refilled.  Patient continues to complain of shortness of breath even at rest and frequent unproductive cough.  He denies fever.  He continues to have chest pain despite multiple evaluations by cardiology.    The following portions of the patient's history were reviewed and updated as appropriate: allergies, current medications, past family history, past medical history, past social history, past surgical history and problem list.    Review of Systems   Constitutional: Positive for fatigue. Negative for activity change, appetite change, chills, fever and unexpected weight change.   HENT: Negative.  Negative for congestion, drooling, facial swelling, postnasal drip, rhinorrhea, sinus pressure, sore throat, trouble swallowing and voice change.    Eyes: Negative.  Negative for photophobia, pain, discharge and visual disturbance.   Respiratory: Positive for wheezing. Negative for apnea, cough and choking.    Cardiovascular: Positive for chest pain. Negative for palpitations and leg swelling.   Gastrointestinal: Positive for diarrhea. Negative for abdominal distention, abdominal pain, constipation, nausea and vomiting.   Endocrine: Positive for polydipsia and polyuria. Negative for polyphagia.   Genitourinary: Negative.  Negative for decreased urine volume, difficulty urinating and dysuria.   Musculoskeletal: Negative.  Negative for arthralgias, gait problem and myalgias.   Skin: Negative.  Negative for rash and wound.   Allergic/Immunologic: Negative.    Neurological: Negative.   Negative for dizziness, syncope, weakness, light-headedness, numbness and headaches.   Hematological: Negative.    Psychiatric/Behavioral: Negative.  Negative for confusion, decreased concentration and sleep disturbance. The patient is not nervous/anxious.        Objective   Physical Exam   Constitutional: He is oriented to person, place, and time. Vital signs are normal. He appears well-developed and well-nourished.  Non-toxic appearance. He appears distressed.   Morbid obesity   HENT:   Head: Normocephalic and atraumatic.   Right Ear: Tympanic membrane, external ear and ear canal normal.   Left Ear: Tympanic membrane, external ear and ear canal normal.   Nose: Nose normal.   Mouth/Throat: Uvula is midline, oropharynx is clear and moist and mucous membranes are normal. No posterior oropharyngeal edema or posterior oropharyngeal erythema.   Eyes: Lids are normal. Pupils are equal, round, and reactive to light.   Neck: Trachea normal and normal range of motion. Neck supple. No thyroid mass present.   Cardiovascular: Normal rate, regular rhythm, S1 normal, S2 normal, normal heart sounds and intact distal pulses.  PMI is displaced.  Exam reveals no gallop and no friction rub.    No murmur heard.      Pulmonary/Chest: Effort normal and breath sounds normal. No respiratory distress. He has no wheezes. He has no rales.   Labored breathing with speaking   Abdominal: Soft. Normal appearance and bowel sounds are normal. He exhibits no mass. There is no rebound and no guarding.   Musculoskeletal: Normal range of motion.   Lymphadenopathy:     He has no cervical adenopathy.   Neurological: He is alert and oriented to person, place, and time. He has normal strength. No cranial nerve deficit or sensory deficit. Gait normal.   Skin: Skin is warm and dry. No rash noted.   Psychiatric: He has a normal mood and affect. His speech is normal and behavior is normal. Judgment and thought content normal. Cognition and memory are normal.    Nursing note and vitals reviewed.      Assessment/Plan   Yordy was seen today for shortness of breath, med refill and cough.    Diagnoses and all orders for this visit:    Other acute pulmonary embolism without acute cor pulmonale    Pulmonary embolism on long-term anticoagulation therapy    Factor II deficiency    Obstructive sleep apnea syndrome    Morbid obesity    Essential hypertension    Coronary artery disease involving native coronary artery of native heart with other form of angina pectoris    Other orders  -     pramipexole (MIRAPEX) 1 MG tablet; Take 2 tablets by mouth Every Night. Taking 2mg daily  -     amLODIPine (NORVASC) 10 MG tablet; Take 1 tablet by mouth Every Night.  -     clopidogrel (PLAVIX) 75 MG tablet; Take 1 tablet by mouth Daily.  -     lisinopril (PRINIVIL,ZESTRIL) 40 MG tablet; Take 1 tablet by mouth Every Morning.  -     metFORMIN (GLUCOPHAGE) 1000 MG tablet; Take 1 tablet by mouth 2 (Two) Times a Day With Meals.  -     metoprolol succinate XL (TOPROL-XL) 100 MG 24 hr tablet; Take 1 tablet by mouth Every Night.  -     pantoprazole (PROTONIX) 40 MG EC tablet; Take 1 tablet by mouth Every Night.  -     pravastatin (PRAVACHOL) 80 MG tablet; Take 1 tablet by mouth Every Night.  -     ranolazine (RANEXA) 1000 MG 12 hr tablet; Take 1 tablet by mouth Every 12 (Twelve) Hours.  -     sertraline (ZOLOFT) 25 MG tablet; Take 1 tablet by mouth Daily.  -     traZODone (DESYREL) 300 MG tablet; Take 1 tablet by mouth Every Night.  -     nitroglycerin (NITROSTAT) 0.4 MG SL tablet; Place 1 tablet under the tongue Every 5 (Five) Minutes As Needed for Chest Pain. Take no more than 3 doses in 15 minutes.  -     albuterol (ACCUNEB) 1.25 MG/3ML nebulizer solution; Take 3 mL by nebulization Every 6 (Six) Hours As Needed for Wheezing.  -     apixaban (ELIQUIS) 5 MG tablet tablet; Take 1 tablet by mouth 2 (Two) Times a Day.          Patient is given prescription for new CPAP with settings at 15 and new mask  with supplies

## 2017-11-14 ENCOUNTER — APPOINTMENT (OUTPATIENT)
Dept: GENERAL RADIOLOGY | Facility: HOSPITAL | Age: 37
End: 2017-11-14

## 2017-11-14 ENCOUNTER — HOSPITAL ENCOUNTER (OUTPATIENT)
Facility: HOSPITAL | Age: 37
Setting detail: OBSERVATION
Discharge: HOME | End: 2017-11-15
Attending: EMERGENCY MEDICINE | Admitting: HOSPITALIST

## 2017-11-14 ENCOUNTER — APPOINTMENT (OUTPATIENT)
Dept: CT IMAGING | Facility: HOSPITAL | Age: 37
End: 2017-11-14

## 2017-11-14 DIAGNOSIS — R07.9 CHEST PAIN, UNSPECIFIED TYPE: Primary | ICD-10-CM

## 2017-11-14 LAB
ALBUMIN SERPL-MCNC: 4.2 G/DL (ref 3.4–4.8)
ALBUMIN/GLOB SERPL: 1.1 G/DL (ref 1.1–1.8)
ALP SERPL-CCNC: 77 U/L (ref 38–126)
ALT SERPL W P-5'-P-CCNC: 31 U/L (ref 21–72)
ANION GAP SERPL CALCULATED.3IONS-SCNC: 9 MMOL/L (ref 5–15)
AST SERPL-CCNC: 31 U/L (ref 17–59)
BASOPHILS # BLD AUTO: 0.02 10*3/MM3 (ref 0–0.2)
BASOPHILS NFR BLD AUTO: 0.2 % (ref 0–2)
BILIRUB SERPL-MCNC: 0.7 MG/DL (ref 0.2–1.3)
BUN BLD-MCNC: 10 MG/DL (ref 7–21)
BUN/CREAT SERPL: 13.9 (ref 7–25)
CALCIUM SPEC-SCNC: 9.5 MG/DL (ref 8.4–10.2)
CHLORIDE SERPL-SCNC: 96 MMOL/L (ref 95–110)
CO2 SERPL-SCNC: 30 MMOL/L (ref 22–31)
CREAT BLD-MCNC: 0.72 MG/DL (ref 0.7–1.3)
DEPRECATED RDW RBC AUTO: 41 FL (ref 35.1–43.9)
EOSINOPHIL # BLD AUTO: 0.22 10*3/MM3 (ref 0–0.7)
EOSINOPHIL NFR BLD AUTO: 2.3 % (ref 0–7)
ERYTHROCYTE [DISTWIDTH] IN BLOOD BY AUTOMATED COUNT: 14.1 % (ref 11.5–14.5)
GFR SERPL CREATININE-BSD FRML MDRD: 122 ML/MIN/1.73 (ref 60–162)
GLOBULIN UR ELPH-MCNC: 3.8 GM/DL (ref 2.3–3.5)
GLUCOSE BLD-MCNC: 170 MG/DL (ref 60–100)
HCT VFR BLD AUTO: 42.9 % (ref 39–49)
HGB BLD-MCNC: 14.8 G/DL (ref 13.7–17.3)
HOLD SPECIMEN: NORMAL
HOLD SPECIMEN: NORMAL
IMM GRANULOCYTES # BLD: 0.14 10*3/MM3 (ref 0–0.02)
IMM GRANULOCYTES NFR BLD: 1.5 % (ref 0–0.5)
LYMPHOCYTES # BLD AUTO: 1.93 10*3/MM3 (ref 0.6–4.2)
LYMPHOCYTES NFR BLD AUTO: 20 % (ref 10–50)
MCH RBC QN AUTO: 27.8 PG (ref 26.5–34)
MCHC RBC AUTO-ENTMCNC: 34.5 G/DL (ref 31.5–36.3)
MCV RBC AUTO: 80.5 FL (ref 80–98)
MONOCYTES # BLD AUTO: 0.8 10*3/MM3 (ref 0–0.9)
MONOCYTES NFR BLD AUTO: 8.3 % (ref 0–12)
NEUTROPHILS # BLD AUTO: 6.53 10*3/MM3 (ref 2–8.6)
NEUTROPHILS NFR BLD AUTO: 67.7 % (ref 37–80)
NT-PROBNP SERPL-MCNC: 21.2 PG/ML (ref 0–450)
PLATELET # BLD AUTO: 183 10*3/MM3 (ref 150–450)
PMV BLD AUTO: 9 FL (ref 8–12)
POTASSIUM BLD-SCNC: 3.9 MMOL/L (ref 3.5–5.1)
PROT SERPL-MCNC: 8 G/DL (ref 6.3–8.6)
RBC # BLD AUTO: 5.33 10*6/MM3 (ref 4.37–5.74)
SODIUM BLD-SCNC: 135 MMOL/L (ref 137–145)
TROPONIN I SERPL-MCNC: <0.012 NG/ML
WBC NRBC COR # BLD: 9.64 10*3/MM3 (ref 3.2–9.8)
WHOLE BLOOD HOLD SPECIMEN: NORMAL
WHOLE BLOOD HOLD SPECIMEN: NORMAL

## 2017-11-14 PROCEDURE — 80053 COMPREHEN METABOLIC PANEL: CPT | Performed by: EMERGENCY MEDICINE

## 2017-11-14 PROCEDURE — 96374 THER/PROPH/DIAG INJ IV PUSH: CPT

## 2017-11-14 PROCEDURE — 93005 ELECTROCARDIOGRAM TRACING: CPT | Performed by: EMERGENCY MEDICINE

## 2017-11-14 PROCEDURE — 99284 EMERGENCY DEPT VISIT MOD MDM: CPT

## 2017-11-14 PROCEDURE — 25010000002 MORPHINE PER 10 MG: Performed by: EMERGENCY MEDICINE

## 2017-11-14 PROCEDURE — 93010 ELECTROCARDIOGRAM REPORT: CPT | Performed by: INTERNAL MEDICINE

## 2017-11-14 PROCEDURE — 71275 CT ANGIOGRAPHY CHEST: CPT

## 2017-11-14 PROCEDURE — 71010 HC CHEST PA OR AP: CPT

## 2017-11-14 PROCEDURE — 83880 ASSAY OF NATRIURETIC PEPTIDE: CPT | Performed by: EMERGENCY MEDICINE

## 2017-11-14 PROCEDURE — 84484 ASSAY OF TROPONIN QUANT: CPT | Performed by: EMERGENCY MEDICINE

## 2017-11-14 PROCEDURE — 85025 COMPLETE CBC W/AUTO DIFF WBC: CPT | Performed by: EMERGENCY MEDICINE

## 2017-11-14 RX ORDER — NITROGLYCERIN 0.4 MG/1
0.4 TABLET SUBLINGUAL
Status: DISCONTINUED | OUTPATIENT
Start: 2017-11-14 | End: 2017-11-15 | Stop reason: HOSPADM

## 2017-11-14 RX ORDER — ASPIRIN 325 MG
325 TABLET ORAL ONCE
Status: COMPLETED | OUTPATIENT
Start: 2017-11-14 | End: 2017-11-14

## 2017-11-14 RX ORDER — MORPHINE SULFATE 2 MG/ML
4 INJECTION, SOLUTION INTRAMUSCULAR; INTRAVENOUS ONCE
Status: COMPLETED | OUTPATIENT
Start: 2017-11-14 | End: 2017-11-14

## 2017-11-14 RX ORDER — SODIUM CHLORIDE 0.9 % (FLUSH) 0.9 %
10 SYRINGE (ML) INJECTION AS NEEDED
Status: DISCONTINUED | OUTPATIENT
Start: 2017-11-14 | End: 2017-11-15 | Stop reason: HOSPADM

## 2017-11-14 RX ADMIN — MORPHINE SULFATE 4 MG: 2 INJECTION, SOLUTION INTRAMUSCULAR; INTRAVENOUS at 22:27

## 2017-11-14 RX ADMIN — NITROGLYCERIN 0.4 MG: 0.4 TABLET SUBLINGUAL at 22:26

## 2017-11-14 RX ADMIN — ASPIRIN 325 MG: 325 TABLET, COATED ORAL at 22:26

## 2017-11-14 RX ADMIN — IOPAMIDOL 95 ML: 755 INJECTION, SOLUTION INTRAVENOUS at 23:24

## 2017-11-15 VITALS
BODY MASS INDEX: 44.1 KG/M2 | DIASTOLIC BLOOD PRESSURE: 68 MMHG | OXYGEN SATURATION: 92 % | SYSTOLIC BLOOD PRESSURE: 136 MMHG | WEIGHT: 315 LBS | HEIGHT: 71 IN | RESPIRATION RATE: 20 BRPM | TEMPERATURE: 96.8 F | HEART RATE: 74 BPM

## 2017-11-15 LAB
D-DIMER, QUANTITATIVE (MAD,POW, STR): <270 NG/ML (FEU) (ref 0–470)
TROPONIN I SERPL-MCNC: <0.012 NG/ML
TROPONIN I SERPL-MCNC: <0.012 NG/ML

## 2017-11-15 PROCEDURE — 85379 FIBRIN DEGRADATION QUANT: CPT | Performed by: NURSE PRACTITIONER

## 2017-11-15 PROCEDURE — 0 IOPAMIDOL PER 1 ML: Performed by: HOSPITALIST

## 2017-11-15 PROCEDURE — 25010000002 HEPARIN (PORCINE) PER 1000 UNITS: Performed by: HOSPITALIST

## 2017-11-15 PROCEDURE — 84484 ASSAY OF TROPONIN QUANT: CPT | Performed by: EMERGENCY MEDICINE

## 2017-11-15 PROCEDURE — G0378 HOSPITAL OBSERVATION PER HR: HCPCS

## 2017-11-15 PROCEDURE — 96372 THER/PROPH/DIAG INJ SC/IM: CPT

## 2017-11-15 PROCEDURE — 84484 ASSAY OF TROPONIN QUANT: CPT | Performed by: NURSE PRACTITIONER

## 2017-11-15 RX ORDER — PRAMIPEXOLE DIHYDROCHLORIDE 1 MG/1
2 TABLET ORAL NIGHTLY
Status: DISCONTINUED | OUTPATIENT
Start: 2017-11-15 | End: 2017-11-15 | Stop reason: HOSPADM

## 2017-11-15 RX ORDER — AMLODIPINE BESYLATE 10 MG/1
10 TABLET ORAL NIGHTLY
Status: DISCONTINUED | OUTPATIENT
Start: 2017-11-15 | End: 2017-11-15 | Stop reason: HOSPADM

## 2017-11-15 RX ORDER — NALOXONE HCL 0.4 MG/ML
0.4 VIAL (ML) INJECTION
Status: DISCONTINUED | OUTPATIENT
Start: 2017-11-15 | End: 2017-11-15 | Stop reason: HOSPADM

## 2017-11-15 RX ORDER — SODIUM CHLORIDE 0.9 % (FLUSH) 0.9 %
1-10 SYRINGE (ML) INJECTION AS NEEDED
Status: DISCONTINUED | OUTPATIENT
Start: 2017-11-15 | End: 2017-11-15 | Stop reason: HOSPADM

## 2017-11-15 RX ORDER — CEFUROXIME AXETIL 500 MG/1
500 TABLET ORAL 2 TIMES DAILY
Qty: 20 TABLET | Refills: 0 | Status: SHIPPED | OUTPATIENT
Start: 2017-11-15 | End: 2017-12-14 | Stop reason: HOSPADM

## 2017-11-15 RX ORDER — METOPROLOL SUCCINATE 100 MG/1
100 TABLET, EXTENDED RELEASE ORAL NIGHTLY
Status: DISCONTINUED | OUTPATIENT
Start: 2017-11-15 | End: 2017-11-15 | Stop reason: HOSPADM

## 2017-11-15 RX ORDER — METHYLPREDNISOLONE 4 MG/1
TABLET ORAL
Qty: 1 EACH | Refills: 0 | Status: SHIPPED | OUTPATIENT
Start: 2017-11-15 | End: 2017-12-14 | Stop reason: HOSPADM

## 2017-11-15 RX ORDER — ALBUTEROL SULFATE 2.5 MG/3ML
2.5 SOLUTION RESPIRATORY (INHALATION) EVERY 6 HOURS PRN
Status: DISCONTINUED | OUTPATIENT
Start: 2017-11-15 | End: 2017-11-15 | Stop reason: HOSPADM

## 2017-11-15 RX ORDER — MORPHINE SULFATE 2 MG/ML
1 INJECTION, SOLUTION INTRAMUSCULAR; INTRAVENOUS EVERY 4 HOURS PRN
Status: DISCONTINUED | OUTPATIENT
Start: 2017-11-15 | End: 2017-11-15 | Stop reason: HOSPADM

## 2017-11-15 RX ORDER — TRAZODONE HYDROCHLORIDE 150 MG/1
300 TABLET ORAL NIGHTLY
Status: DISCONTINUED | OUTPATIENT
Start: 2017-11-15 | End: 2017-11-15 | Stop reason: HOSPADM

## 2017-11-15 RX ORDER — NITROGLYCERIN 0.4 MG/1
0.4 TABLET SUBLINGUAL
Status: DISCONTINUED | OUTPATIENT
Start: 2017-11-15 | End: 2017-11-15 | Stop reason: SDUPTHER

## 2017-11-15 RX ORDER — CLOPIDOGREL BISULFATE 75 MG/1
75 TABLET ORAL DAILY
Status: DISCONTINUED | OUTPATIENT
Start: 2017-11-15 | End: 2017-11-15 | Stop reason: HOSPADM

## 2017-11-15 RX ORDER — RANOLAZINE 500 MG/1
1000 TABLET, EXTENDED RELEASE ORAL EVERY 12 HOURS SCHEDULED
Status: DISCONTINUED | OUTPATIENT
Start: 2017-11-15 | End: 2017-11-15 | Stop reason: HOSPADM

## 2017-11-15 RX ORDER — ALBUTEROL SULFATE 1.25 MG/3ML
1 SOLUTION RESPIRATORY (INHALATION) EVERY 6 HOURS PRN
Qty: 100 VIAL | Refills: 0 | Status: SHIPPED | OUTPATIENT
Start: 2017-11-15 | End: 2021-01-13 | Stop reason: HOSPADM

## 2017-11-15 RX ORDER — SERTRALINE HYDROCHLORIDE 25 MG/1
25 TABLET, FILM COATED ORAL DAILY
Status: DISCONTINUED | OUTPATIENT
Start: 2017-11-15 | End: 2017-11-15 | Stop reason: HOSPADM

## 2017-11-15 RX ORDER — OXYMETAZOLINE HYDROCHLORIDE 0.05 G/100ML
2 SPRAY NASAL 2 TIMES DAILY
Status: DISCONTINUED | OUTPATIENT
Start: 2017-11-15 | End: 2017-11-15 | Stop reason: HOSPADM

## 2017-11-15 RX ORDER — ATORVASTATIN CALCIUM 20 MG/1
20 TABLET, FILM COATED ORAL DAILY
Status: DISCONTINUED | OUTPATIENT
Start: 2017-11-15 | End: 2017-11-15 | Stop reason: HOSPADM

## 2017-11-15 RX ORDER — HEPARIN SODIUM 5000 [USP'U]/ML
5000 INJECTION, SOLUTION INTRAVENOUS; SUBCUTANEOUS EVERY 8 HOURS SCHEDULED
Status: DISCONTINUED | OUTPATIENT
Start: 2017-11-15 | End: 2017-11-15 | Stop reason: HOSPADM

## 2017-11-15 RX ORDER — PANTOPRAZOLE SODIUM 40 MG/1
40 TABLET, DELAYED RELEASE ORAL NIGHTLY
Status: DISCONTINUED | OUTPATIENT
Start: 2017-11-15 | End: 2017-11-15 | Stop reason: HOSPADM

## 2017-11-15 RX ORDER — LISINOPRIL 40 MG/1
40 TABLET ORAL EVERY MORNING
Status: DISCONTINUED | OUTPATIENT
Start: 2017-11-15 | End: 2017-11-15 | Stop reason: HOSPADM

## 2017-11-15 RX ORDER — ALBUTEROL SULFATE 2.5 MG/3ML
1.25 SOLUTION RESPIRATORY (INHALATION) EVERY 6 HOURS PRN
Status: DISCONTINUED | OUTPATIENT
Start: 2017-11-15 | End: 2017-11-15 | Stop reason: HOSPADM

## 2017-11-15 RX ADMIN — NITROGLYCERIN 1 INCH: 20 OINTMENT TOPICAL at 04:40

## 2017-11-15 RX ADMIN — APIXABAN 5 MG: 5 TABLET, FILM COATED ORAL at 08:24

## 2017-11-15 RX ADMIN — LISINOPRIL 40 MG: 40 TABLET ORAL at 06:08

## 2017-11-15 RX ADMIN — HEPARIN SODIUM 5000 UNITS: 5000 INJECTION, SOLUTION INTRAVENOUS; SUBCUTANEOUS at 06:08

## 2017-11-15 RX ADMIN — SERTRALINE HYDROCHLORIDE 25 MG: 25 TABLET ORAL at 08:24

## 2017-11-15 RX ADMIN — ATORVASTATIN CALCIUM 20 MG: 20 TABLET, FILM COATED ORAL at 08:24

## 2017-11-15 RX ADMIN — CLOPIDOGREL BISULFATE 75 MG: 75 TABLET ORAL at 08:24

## 2017-11-15 RX ADMIN — RANOLAZINE 1000 MG: 500 TABLET, FILM COATED, EXTENDED RELEASE ORAL at 08:24

## 2017-11-15 RX ADMIN — METFORMIN HYDROCHLORIDE 1000 MG: 500 TABLET ORAL at 08:24

## 2017-11-15 RX ADMIN — OXYMETAZOLINE HYDROCHLORIDE 2 SPRAY: 5 SPRAY NASAL at 08:24

## 2017-11-15 NOTE — CONSULTS
"Adult Nutrition  Assessment    Patient Name:  Yordy Hansen  YOB: 1980  MRN: [id number]  Admit Date:  11/14/2017    Assessment Date:  11/15/2017    Comments:  Pt presents with a BMI of 63.18 which is compatible with morbid obesity, 263% of his IBW.  He demonstrated poor compliance of dietary guidelines.  RD provided education on Making Lifestyle changes for a Healthy Weight; Diabetes and Food; and Low sodium diet guidelines.  Diet copies and contact number also provided          Reason for Assessment       11/15/17 1345    Reason for Assessment    Reason For Assessment/Visit identified at risk by screening criteria    Identified At Risk By Screening Criteria BMI    Cardiac Other (comment)   chest pain                Nutrition/Diet History       11/15/17 1346    Nutrition/Diet History    Typical Food/Fluid Intake Pt reports that it is hard to lose wt with \"turkey and all the trimming coming up\"  He does drink regular sodas most of the time.  He  tries to limit salt and sugar               Labs/Tests/Procedures/Meds       11/15/17 1347    Labs/Tests/Procedures/Meds    Labs/Tests Review Reviewed;Glucose;Alb    Medication Review Reviewed, pertinent;Diabetic Oral Agent;Steroid            Physical Findings       11/15/17 1347    Physical Findings/Assessment    Additional Documentation Physical Appearance (Group)    Physical Appearance    Overall Physical Appearance overweight;obese                  Electronically signed by:  Chiquita Radford RD  11/15/17 1:53 PM  "

## 2017-11-15 NOTE — ED PROVIDER NOTES
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    HENT: Negative.    Eyes: Negative.    Respiratory: Positive for chest tightness and shortness of breath. Negative for apnea, cough, choking, wheezing and stridor.    Cardiovascular: Positive for chest pain. Negative for palpitations and leg swelling.   Gastrointestinal: Negative.    Endocrine: Negative.    Musculoskeletal: Negative.    Skin: Negative.    Neurological: Negative.    Hematological: Negative.        Past Medical History:   Diagnosis Date   • Allergic rhinitis    • Asthma    • Chest pain    • CHF (congestive heart failure)    • Chronic back pain    • Coronary artery disease     2 stents.  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\"   • GERD (gastroesophageal reflux disease)    • Hyperlipidemia    • Hypertension    • Low back pain    • Morbid obesity    • Pulmonary embolism    • RLS (restless legs syndrome)    • Seizures    • Sleep apnea        Allergies   Allergen Reactions   • Glipizide Other (See Comments)     Hallucinations     • Phenergan [Promethazine Hcl] Other (See Comments)     Burning veins   • Risperdal [Risperidone] Other (See Comments)     " Slurred speech   • Tramadol Other (See Comments)     seizures   • Reglan [Metoclopramide] Anxiety       Past Surgical History:   Procedure Laterality Date   • ADENOIDECTOMY     • CARDIAC CATHETERIZATION N/A 3/15/2017    Procedure: Left Heart Cath;  Surgeon: Edwige Briceno MD;  Location: [facility] CATH INVASIVE LOCATION;  Service:    • CARDIAC CATHETERIZATION N/A 3/15/2017    Procedure: Stent SOPHIA coronary;  Surgeon: Edwige Briceno MD;  Location: [facility] CATH INVASIVE LOCATION;  Service:    • CHOLECYSTECTOMY     • CORONARY ANGIOPLASTY WITH STENT PLACEMENT     • TN RT/LT HEART CATHETERS N/A 3/15/2017    Procedure: Percutaneous Coronary Intervention;  Surgeon: Edwige Briceno MD;  Location: [facility] CATH INVASIVE LOCATION;  Service: Cardiovascular   • TONSILLECTOMY     • TRANSESOPHAGEAL ECHOCARDIOGRAM ([place])         Family History   Problem Relation Age of Onset   • Cancer Other    • Coronary artery disease Other    • Diabetes Other    • Heart disease Other    • Hypertension Other        Social History     Social History   • Marital status:      Spouse name: Cori   • Number of children: 0   • Years of education: N/A     Occupational History   • Disabled      Social History Main Topics   • Smoking status: Former Smoker     Quit date: 1997   • Smokeless tobacco: Former User     Types: Snuff     Quit date: 2017   • Alcohol use No   • Drug use: No   • Sexual activity: Defer     Other Topics Concern   • None     Social History Narrative           Objective   Physical Exam   Constitutional: He is oriented to person, place, and time. He appears well-nourished.   HENT:   Head: Normocephalic and atraumatic.   Eyes: EOM are normal. Pupils are equal, round, and reactive to light.   Neck: Normal range of motion. Neck supple. No JVD present. No tracheal deviation present. No thyromegaly present.   Cardiovascular: Normal rate, regular rhythm, normal heart sounds and intact distal pulses.  Exam reveals no gallop and no friction rub.    No  murmur heard.  Pulmonary/Chest: Effort normal and breath sounds normal. No stridor. No respiratory distress. He has no wheezes. He has no rales. He exhibits no tenderness.   Abdominal: Soft. Bowel sounds are normal. He exhibits no distension and no mass. There is no tenderness. There is no rebound and no guarding. No hernia.   Musculoskeletal: Normal range of motion. He exhibits no edema, tenderness or deformity.   Neurological: He is alert and oriented to person, place, and time.   Skin: Skin is warm and dry. No rash noted. No erythema. No pallor.   Nursing note and vitals reviewed.      ECG 12 Lead    Date/Time: 11/14/2017 10:17 PM  Performed by: JYALON JHA  Authorized by: JAYLON JHA   Interpreted by physician  Comparison: not compared with previous ECG   Rhythm: sinus rhythm  Rate: normal  QRS axis: right  Clinical impression: non-specific ECG               ED Course  ED Course        Labs Reviewed   COMPREHENSIVE METABOLIC PANEL - Abnormal; Notable for the following:        Result Value    Glucose 170 (*)     Sodium 135 (*)     Globulin 3.8 (*)     All other components within normal limits   CBC WITH AUTO DIFFERENTIAL - Abnormal; Notable for the following:     Immature Grans % 1.5 (*)     Immature Grans, Absolute 0.14 (*)     All other components within normal limits   TROPONIN (IN-HOUSE) - Normal   TROPONIN (IN-HOUSE) - Normal   BNP (IN-HOUSE) - Normal   TROPONIN (IN-HOUSE) - Normal   D-DIMER, QUANTITATIVE - Normal    Narrative:     Dimer values <500 ng/ml FEU are FDA approved as aid in diagnosis of deep venous thrombosis and pulmonary embolism.  This test should not be used in an exclusion strategy with pretest probability alone.    A recent guideline regarding diagnosis for pulmonary thomboembolism recommends an adjusted exclusion criterion of age x 10 ng/ml FEU for patients >50 years of age ([employer]ori Intern Med 2015; 163: 701-711).   RAINBOW DRAW    Narrative:     The following orders were  created for panel order [place] Draw.  Procedure                               Abnormality         Status                     ---------                               -----------         ------                     Light Blue Top[[id number]]                                   Final result               Green Top (Gel)[[id number]]                                  Final result               Lavender Top[[id number]]                                     Final result               Gold Top - SST[[id number]]                                   Final result                 Please view results for these tests on the individual orders.   CBC AND DIFFERENTIAL    Narrative:     The following orders were created for panel order CBC & Differential.  Procedure                               Abnormality         Status                     ---------                               -----------         ------                     CBC Auto Differential[[id number]]        Abnormal            Final result                 Please view results for these tests on the individual orders.   LIGHT BLUE TOP   GREEN TOP   LAVENDER TOP   GOLD TOP - SST       CT Angiogram Chest With Contrast   Final Result   Limited exam. No obvious pulmonary embolus.      Electronically signed by:  Brennan Corrigan MD  11/15/2017 1:38 AM   CST Workstation: [employer] Chest 1 View   Final Result   No active disease.         Electronically signed by:  Juliocesar Pedraza MD  11/14/2017 10:28 PM   CST Workstation: [email or web address]                          [facility]  Number of Diagnoses or Management Options  Diagnosis management comments: Differential diagnoses he coronary syndrome PE pneumothorax dissection pneumonia    Patient Progress  Patient progress: (Patient's chest pain improved here in the emergency department. CT negative for PE.  Patient EKG and troponins are negative.  With patient's cardiac history primary care sister admit for cardiac rule out admitted to Dr. Goncalves in  stable condition)      Final diagnoses:   None   1.  Chest Pain          Elle Goncalves MD  11/18/17 0904

## 2017-11-15 NOTE — H&P
[facility] Medicine Admission      Date of Admission: 11/14/2017      Primary Care Physician: LALA Barajas      Chief Complaint:  Chest pain    HPI:  Patient states that he was at home, in his normal state of health, when he began to feel a little short of breath and chest tightness.  He states that the shortness of breath was very short lived, and the chest tightness changed to chest pain.  He took nitroglycerin at home, which helped.  He has had associated nausea, but no vomiting.  He states he was given more nitroglycerin and pain medication in the ED which helped as well.  He was still having chest pain at the time of my interview, but it was improving.  There were no alleviating or exacerbating factors.    Concurrent Medical History:  has a past medical history of Allergic rhinitis; Asthma; Chest pain; CHF (congestive heart failure); Chronic back pain; Coronary artery disease; Diabetes mellitus; Factor II deficiency; GERD (gastroesophageal reflux disease); Hyperlipidemia; Hypertension; Low back pain; Morbid obesity; Pulmonary embolism; RLS (restless legs syndrome); Seizures; and Sleep apnea.    Past Surgical History:  has a past surgical history that includes transesophageal echocardiogram ([place]); rt/lt heart catheters (N/A, 3/15/2017); Cardiac catheterization (N/A, 3/15/2017); Cardiac catheterization (N/A, 3/15/2017); Adenoidectomy; Cholecystectomy; Tonsillectomy; and Coronary angioplasty with stent.    Family History: family history includes Cancer in his other; Coronary artery disease in his other; Diabetes in his other; Heart disease in his other; Hypertension in his other.     Social History:  reports that he quit smoking about 20 years ago. He quit smokeless tobacco use about 10 months ago. His smokeless tobacco use included Snuff. He reports that he does not drink alcohol or use illicit drugs.    Allergies:   Allergies   Allergen Reactions   • Glipizide  Other (See Comments)     Hallucinations     • Phenergan [Promethazine Hcl] Other (See Comments)     Burning veins   • Risperdal [Risperidone] Other (See Comments)     Slurred speech   • Tramadol Other (See Comments)     seizures   • Reglan [Metoclopramide] Anxiety       Medications:   Prescriptions Prior to Admission   Medication Sig Dispense Refill Last Dose   • albuterol (ACCUNEB) 1.25 MG/3ML nebulizer solution Take 3 mL by nebulization Every 6 (Six) Hours As Needed for Wheezing. 100 vial 12    • albuterol (PROVENTIL HFA;VENTOLIN HFA) 108 (90 BASE) MCG/ACT inhaler Inhale 2 puffs Every 4 (Four) Hours As Needed for Wheezing.   Taking   • amLODIPine (NORVASC) 10 MG tablet Take 1 tablet by mouth Every Night. 30 tablet 5    • apixaban (ELIQUIS) 5 MG tablet tablet Take 1 tablet by mouth 2 (Two) Times a Day. 60 tablet 6    • clopidogrel (PLAVIX) 75 MG tablet Take 1 tablet by mouth Daily. 30 tablet 3    • lisinopril (PRINIVIL,ZESTRIL) 40 MG tablet Take 1 tablet by mouth Every Morning. 30 tablet 5    • metFORMIN (GLUCOPHAGE) 1000 MG tablet Take 1 tablet by mouth 2 (Two) Times a Day With Meals. 60 tablet 3    • metoprolol succinate XL (TOPROL-XL) 100 MG 24 hr tablet Take 1 tablet by mouth Every Night. 30 tablet 5    • nitroglycerin (NITROSTAT) 0.4 MG SL tablet Place 1 tablet under the tongue Every 5 (Five) Minutes As Needed for Chest Pain. Take no more than 3 doses in 15 minutes. 100 tablet 12    • pantoprazole (PROTONIX) 40 MG EC tablet Take 1 tablet by mouth Every Night. 30 tablet 5    • pramipexole (MIRAPEX) 1 MG tablet Take 2 tablets by mouth Every Night. Taking 2mg daily 60 tablet 5    • pravastatin (PRAVACHOL) 80 MG tablet Take 1 tablet by mouth Every Night. 30 tablet 5    • ranolazine (RANEXA) 1000 MG 12 hr tablet Take 1 tablet by mouth Every 12 (Twelve) Hours. 60 tablet 5    • sertraline (ZOLOFT) 25 MG tablet Take 1 tablet by mouth Daily. 30 tablet 5    • traZODone (DESYREL) 300 MG tablet Take 1 tablet by mouth  Every Night. 30 tablet 5    • MICROLET LANCETS misc USE TO TEST BLOOD SUGAR EVERY DAY AND AS NEEDED   More than a month at Unknown time   • oxymetazoline (AFRIN) 0.05 % nasal spray 2 sprays into each nostril 2 (Two) Times a Day. 20 mL 0 Unknown at Unknown time       Review of Systems:  Review of Systems   Constitutional: Negative for appetite change, chills, fatigue, fever and unexpected weight change.   HENT: Negative for congestion, facial swelling, hearing loss, nosebleeds, rhinorrhea, sneezing, trouble swallowing and voice change.    Eyes: Negative for photophobia and visual disturbance.   Respiratory: Positive for shortness of breath. Negative for apnea, cough, choking, chest tightness, wheezing and stridor.    Cardiovascular: Positive for chest pain. Negative for palpitations and leg swelling.   Gastrointestinal: Positive for nausea. Negative for abdominal pain, blood in stool, constipation, diarrhea and vomiting.   Endocrine: Negative for cold intolerance, heat intolerance, polydipsia, polyphagia and polyuria.   Genitourinary: Negative for dysuria, flank pain and hematuria.   Musculoskeletal: Negative for arthralgias, back pain, myalgias and neck pain.   Skin: Negative for rash and wound.   Allergic/Immunologic: Negative for immunocompromised state.   Neurological: Negative for dizziness, seizures, syncope, speech difficulty, weakness, light-headedness, numbness and headaches.   Hematological: Does not bruise/bleed easily.   Psychiatric/Behavioral: Negative for agitation, behavioral problems, confusion, decreased concentration, hallucinations, self-injury and suicidal ideas. The patient is not nervous/anxious.       Otherwise complete ROS is negative except as mentioned above.    Physical Exam:   Temp:  [97.5 °F (36.4 °C)-98.5 °F (36.9 °C)] 97.5 °F (36.4 °C)  Heart Rate:  [78-90] 80  Resp:  [20] 20  BP: (157-169)/(65-94) 157/71     Physical Exam   Constitutional: He is oriented to person, place, and time. He  appears well-developed and well-nourished.   Morbidly obese   HENT:   Head: Normocephalic and atraumatic.   Nose: Nose normal.   Mouth/Throat: Oropharynx is clear and moist.   Eyes: Conjunctivae, EOM and lids are normal. Pupils are equal, round, and reactive to light. No scleral icterus.   Neck: Normal range of motion. Neck supple. No JVD present. No tracheal tenderness and no spinous process tenderness present. No rigidity. No tracheal deviation, no edema and normal range of motion present.   Cardiovascular: Normal rate, regular rhythm, S1 normal, S2 normal, normal heart sounds and normal pulses.  Exam reveals no gallop and no friction rub.    No murmur heard.  Pulmonary/Chest: Effort normal and breath sounds normal. No accessory muscle usage. No respiratory distress. He has no decreased breath sounds. He has no wheezes. He has no rales. He exhibits no tenderness.   Abdominal: Soft. Bowel sounds are normal. He exhibits no distension and no mass. There is no tenderness. There is no rebound and no guarding.   Musculoskeletal: He exhibits no edema or tenderness.   Neurological: He is alert and oriented to person, place, and time. He has normal reflexes. He displays no atrophy and normal reflexes. No cranial nerve deficit or sensory deficit. He exhibits normal muscle tone. He displays no seizure activity. Coordination normal.   Skin: Skin is warm. No rash noted. No pallor.   Psychiatric: He has a normal mood and affect. His behavior is normal. Judgment and thought content normal.         Results Reviewed:  I have personally reviewed current lab, radiology, and data and agree with results.  Lab Results (last 24 hours)     Procedure Component Value Units Date/Time    CBC & Differential [[id number]] Collected:  11/14/17 2208    Specimen:  Blood Updated:  11/14/17 2220    Narrative:       The following orders were created for panel order CBC & Differential.  Procedure                               Abnormality         Status                      ---------                               -----------         ------                     CBC Auto Differential[[id number]]        Abnormal            Final result                 Please view results for these tests on the individual orders.    CBC Auto Differential [[id number]]  (Abnormal) Collected:  11/14/17 2208    Specimen:  Blood Updated:  11/14/17 2220     WBC 9.64 10*3/mm3      RBC 5.33 10*6/mm3      Hemoglobin 14.8 g/dL      Hematocrit 42.9 %      MCV 80.5 fL      MCH 27.8 pg      MCHC 34.5 g/dL      RDW 14.1 %      RDW-SD 41.0 fl      MPV 9.0 fL      Platelets 183 10*3/mm3      Neutrophil % 67.7 %      Lymphocyte % 20.0 %      Monocyte % 8.3 %      Eosinophil % 2.3 %      Basophil % 0.2 %      Immature Grans % 1.5 (H) %      Neutrophils, Absolute 6.53 10*3/mm3      Lymphocytes, Absolute 1.93 10*3/mm3      Monocytes, Absolute 0.80 10*3/mm3      Eosinophils, Absolute 0.22 10*3/mm3      Basophils, Absolute 0.02 10*3/mm3      Immature Grans, Absolute 0.14 (H) 10*3/mm3     Comprehensive Metabolic Panel [[id number]]  (Abnormal) Collected:  11/14/17 2208    Specimen:  Blood Updated:  11/14/17 2232     Glucose 170 (H) mg/dL      BUN 10 mg/dL      Creatinine 0.72 mg/dL      Sodium 135 (L) mmol/L      Potassium 3.9 mmol/L      Chloride 96 mmol/L      CO2 30.0 mmol/L      Calcium 9.5 mg/dL      Total Protein 8.0 g/dL      Albumin 4.20 g/dL      ALT (SGPT) 31 U/L      AST (SGOT) 31 U/L      Alkaline Phosphatase 77 U/L      Total Bilirubin 0.7 mg/dL      eGFR Non African Amer 122 mL/min/1.73      Globulin 3.8 (H) gm/dL      A/G Ratio 1.1 g/dL      BUN/Creatinine Ratio 13.9     Anion Gap 9.0 mmol/L     Troponin [[id number]]  (Normal) Collected:  11/14/17 2208    Specimen:  Blood Updated:  11/14/17 2244     Troponin I <0.012 ng/mL     BNP [[id number]]  (Normal) Collected:  11/14/17 2208    Specimen:  Blood Updated:  11/14/17 2244     proBNP 21.2 pg/mL     Green Top (Gel) [[id number]] Collected:  11/14/17 2208     Specimen:  Blood Updated:  11/14/17 2316     Extra Tube Hold for add-ons.      Auto resulted.       [place] Draw [[id number]] Collected:  11/14/17 2208    Specimen:  Blood Updated:  11/14/17 2316    Narrative:       The following orders were created for panel order [place] Draw.  Procedure                               Abnormality         Status                     ---------                               -----------         ------                     Light Blue Top[[id number]]                                   Final result               Green Top (Gel)[[id number]]                                  Final result               Lavender Top[[id number]]                                     Final result               Gold Top - SST[[id number]]                                   Final result                 Please view results for these tests on the individual orders.    Light Blue Top [[id number]] Collected:  11/14/17 2208    Specimen:  Blood Updated:  11/14/17 2316     Extra Tube hold for add-on      Auto resulted       Lavender Top [[id number]] Collected:  11/14/17 2208    Specimen:  Blood Updated:  11/14/17 2316     Extra Tube hold for add-on      Auto resulted       Gold Top - SST [[id number]] Collected:  11/14/17 2208    Specimen:  Blood Updated:  11/14/17 2316     Extra Tube Hold for add-ons.      Auto resulted.           Imaging Results (last 24 hours)     Procedure Component Value Units Date/Time    XR Chest 1 View [[id number]] Collected:  11/14/17 2215     Updated:  11/14/17 2230    Narrative:         [facility] [place]    Patient Name: KEARA ESCOBAR    ATTENDING: JAYLON JHA     REFERRING: JAYLON JHA    ORDERING: JAYLON JHA    -----------------------    PROCEDURE: Chest, AP portable    Date of exam: 11/14/2017 at 2143 hours    HISTORY: Chest pain    A single portable view of the chest was obtained. No previous  studies are available for comparison.    The lungs are satisfactorily  expanded and clear of infiltrates or  effusions. The heart size is enlarged and the vasculature does  not appear congested.The costophrenic angles are clear.       Impression:       No active disease.      Electronically signed by:  Juliocesar Pedraza MD  11/14/2017 10:28 PM  [facility] Workstation: [id number]            Assessment:    Hospital Problem List     Chest pain                Plan:  1.  Chest Pain:  Recurrent in a patient with known CAD.  Enzymes and EKG unremarkable.  Finish rule out.  Continue current medications.  2.  DM  3.  GERD  4.  ADOLFO  5.  DVT Prophylaxis:  Heparin      I discussed the patients findings and my recommendations with: patient        This document has been electronically signed by Yordy Goncalves MD on November 15, 2017 4:53 AM

## 2017-11-15 NOTE — PLAN OF CARE
Problem: Patient Care Overview (Adult)  Goal: Plan of Care Review  Outcome: Ongoing (interventions implemented as appropriate)    11/15/17 0444   Coping/Psychosocial Response Interventions   Plan Of Care Reviewed With patient   Patient Care Overview   Progress no change   Outcome Evaluation   Outcome Summary/Follow up Plan new admit, VSS, c/o chest pain       Goal: Adult Individualization and Mutuality  Outcome: Ongoing (interventions implemented as appropriate)  Goal: Discharge Needs Assessment  Outcome: Ongoing (interventions implemented as appropriate)    Problem: Pain, Acute (Adult)  Goal: Identify Related Risk Factors and Signs and Symptoms  Outcome: Ongoing (interventions implemented as appropriate)  Goal: Acceptable Pain Control/Comfort Level  Outcome: Ongoing (interventions implemented as appropriate)

## 2017-11-15 NOTE — DISCHARGE SUMMARY
[facility] Medicine Services  DISCHARGE SUMMARY       Date of Admission: 11/14/2017  Date of Discharge:  11/15/2017  Primary Care Physician: LALA Braajas    Presenting Problem/History of Present Illness:  Chest pain [R07.9]     Final Discharge Diagnoses:  Pleuritic chest pain    Consults:   Consults     No orders found for last 30 day(s).          Procedures Performed:                 Pertinent Test Results:   Lab Results (last 24 hours)     Procedure Component Value Units Date/Time    CBC & Differential [[id number]] Collected:  11/14/17 2208    Specimen:  Blood Updated:  11/14/17 2220    Narrative:       The following orders were created for panel order CBC & Differential.  Procedure                               Abnormality         Status                     ---------                               -----------         ------                     CBC Auto Differential[[id number]]        Abnormal            Final result                 Please view results for these tests on the individual orders.    CBC Auto Differential [[id number]]  (Abnormal) Collected:  11/14/17 2208    Specimen:  Blood Updated:  11/14/17 2220     WBC 9.64 10*3/mm3      RBC 5.33 10*6/mm3      Hemoglobin 14.8 g/dL      Hematocrit 42.9 %      MCV 80.5 fL      MCH 27.8 pg      MCHC 34.5 g/dL      RDW 14.1 %      RDW-SD 41.0 fl      MPV 9.0 fL      Platelets 183 10*3/mm3      Neutrophil % 67.7 %      Lymphocyte % 20.0 %      Monocyte % 8.3 %      Eosinophil % 2.3 %      Basophil % 0.2 %      Immature Grans % 1.5 (H) %      Neutrophils, Absolute 6.53 10*3/mm3      Lymphocytes, Absolute 1.93 10*3/mm3      Monocytes, Absolute 0.80 10*3/mm3      Eosinophils, Absolute 0.22 10*3/mm3      Basophils, Absolute 0.02 10*3/mm3      Immature Grans, Absolute 0.14 (H) 10*3/mm3     Comprehensive Metabolic Panel [[id number]]  (Abnormal) Collected:  11/14/17 2208    Specimen:  Blood Updated:  11/14/17 2232     Glucose 170  (H) mg/dL      BUN 10 mg/dL      Creatinine 0.72 mg/dL      Sodium 135 (L) mmol/L      Potassium 3.9 mmol/L      Chloride 96 mmol/L      CO2 30.0 mmol/L      Calcium 9.5 mg/dL      Total Protein 8.0 g/dL      Albumin 4.20 g/dL      ALT (SGPT) 31 U/L      AST (SGOT) 31 U/L      Alkaline Phosphatase 77 U/L      Total Bilirubin 0.7 mg/dL      eGFR Non African Amer 122 mL/min/1.73      Globulin 3.8 (H) gm/dL      A/G Ratio 1.1 g/dL      BUN/Creatinine Ratio 13.9     Anion Gap 9.0 mmol/L     Troponin [[id number]]  (Normal) Collected:  11/14/17 2208    Specimen:  Blood Updated:  11/14/17 2244     Troponin I <0.012 ng/mL     BNP [[id number]]  (Normal) Collected:  11/14/17 2208    Specimen:  Blood Updated:  11/14/17 2244     proBNP 21.2 pg/mL     Green Top (Gel) [[id number]] Collected:  11/14/17 2208    Specimen:  Blood Updated:  11/14/17 2316     Extra Tube Hold for add-ons.      Auto resulted.       [place] Draw [[id number]] Collected:  11/14/17 2208    Specimen:  Blood Updated:  11/14/17 2316    Narrative:       The following orders were created for panel order [place] Draw.  Procedure                               Abnormality         Status                     ---------                               -----------         ------                     Light Blue Top[[id number]]                                   Final result               Green Top (Gel)[[id number]]                                  Final result               Lavender Top[[id number]]                                     Final result               Gold Top - SST[[id number]]                                   Final result                 Please view results for these tests on the individual orders.    Light Blue Top [[id number]] Collected:  11/14/17 2208    Specimen:  Blood Updated:  11/14/17 2316     Extra Tube hold for add-on      Auto resulted       Lavender Top [[id number]] Collected:  11/14/17 2208    Specimen:  Blood Updated:  11/14/17 2316     Extra Tube hold for  add-on      Auto resulted       Gold Top - SST [[id number]] Collected:  11/14/17 2208    Specimen:  Blood Updated:  11/14/17 2316     Extra Tube Hold for add-ons.      Auto resulted.       Troponin [[id number]]  (Normal) Collected:  11/15/17 0530    Specimen:  Blood Updated:  11/15/17 0640     Troponin I <0.012 ng/mL     D-dimer, Quantitative [[id number]]  (Normal) Collected:  11/15/17 1022    Specimen:  Blood Updated:  11/15/17 1053     D-Dimer, Quantitative <270 ng/mL (FEU)     Narrative:       Dimer values <500 ng/ml FEU are FDA approved as aid in diagnosis of deep venous thrombosis and pulmonary embolism.  This test should not be used in an exclusion strategy with pretest probability alone.    A recent guideline regarding diagnosis for pulmonary thomboembolism recommends an adjusted exclusion criterion of age x 10 ng/ml FEU for patients >50 years of age (Lori Intern Med 2015; 163: 701-711).    Troponin [[id number]]  (Normal) Collected:  11/15/17 1022    Specimen:  Blood Updated:  11/15/17 1100     Troponin I <0.012 ng/mL         Imaging Results (all)     Procedure Component Value Units Date/Time    XR Chest 1 View [[id number]] Collected:  11/14/17 2215     Updated:  11/14/17 2230    Narrative:         [facility] [place]    Patient Name: TREY ESCOBARIN    ATTENDING: JAYLON JHA     REFERRING: JAYLON JHA    ORDERING: JAYLON JHA    -----------------------    PROCEDURE: Chest, AP portable    Date of exam: 11/14/2017 at 2143 hours    HISTORY: Chest pain    A single portable view of the chest was obtained. No previous  studies are available for comparison.    The lungs are satisfactorily expanded and clear of infiltrates or  effusions. The heart size is enlarged and the vasculature does  not appear congested.The costophrenic angles are clear.       Impression:       No active disease.      Electronically signed by:  Juliocesar Pedraza MD  11/14/2017 10:28 PM  [facility] Workstation: [employer]     CT Angiogram Chest With Contrast [[id number]] Collected:  11/15/17 0122     Updated:  11/15/17 1154    Narrative:       Exam: CT angiogram chest with contrast.    INDICATION: Chest pain    TECHNIQUE: Routine CT angiogram chest with contrast Computer  generated 3-D reconstruction/MIPS were obtained. The evaluation  of the pulmonary arteries is suboptimal due to miss timing of the  contrast bolus an patient's body habitus.  CT technique performed  using ALARA.    FINDINGS: No mediastinal hilar lymphadenopathy. Thoracic aorta is  normal. No visible thrombus in pulmonary arterial tree.. Limited  evaluation distal segmental pulmonary artery branches. Heart size  normal. Lungs are clear. No pneumothorax or pleural effusion.    Status post cholecystectomy.    Old healed left six rib fracture.      Impression:       Limited exam. No obvious pulmonary embolus.    Electronically signed by:  Brennan Corrigan MD  11/15/2017 1:38 AM  [facility] Workstation: [id number]            Chief Complaint on Day of Discharge: None    Hospital Course:  37-year-old  male with past medical history of congestive heart failure, chronic back pain, coronary artery disease, type 2 diabetes, factor II deficiency, gastroesophageal reflux disease, hyperlipidemia, hypertension, pulmonary embolism, morbid obesity who presented yesterday with complaints of chest pain.  The patient has had multiple recent admissions for similar complaints and due to his history of coronary artery disease he was kept overnight for cardiac observation.  The patient had 3 negative sets of cardiac enzymes during the hospital stay.  At day of discharge the patient was experiencing pain with palpation of the chest wall and with coughing and it was deemed that patient's pain was pleuritic in nature.  The patient was recently treated in outpatient setting with an oral antibiotic and steroid Dosepak related to bronchitis, but reports that after completing his antibiotic two  "days ago his symptoms returned.  The patient was given prescriptions for further antibiotics and steroids to complete his course of treatment and instructed to follow-up with his primary care provider within one week of discharge as well as to keep his scheduled appointments with cardiology.  He was also instructed that heating pad and short course of NSAIDS prn could be used for treatment of his pleuritic chest pain.     Condition on Discharge:  Stable    Physical Exam on Discharge:  /68 (BP Location: Left arm, Patient Position: Lying)  Pulse 74  Temp 96.8 °F (36 °C) (Oral)   Resp 20  Ht 71\" (180.3 cm)  Wt (!) 453 lb (205 kg)  SpO2 92%  BMI 63.18 kg/m2  Physical Exam   Constitutional: He is oriented to person, place, and time. He appears well-developed and well-nourished.   HENT:   Head: Normocephalic and atraumatic.   Eyes: Conjunctivae are normal.   Neck: Neck supple.   Cardiovascular: Normal rate, normal heart sounds and intact distal pulses.    Pulmonary/Chest: Effort normal and breath sounds normal.   Abdominal: Soft. Bowel sounds are normal.   Musculoskeletal: Normal range of motion.   Neurological: He is alert and oriented to person, place, and time.   Skin: Skin is warm and dry.   Psychiatric: He has a normal mood and affect. His behavior is normal.   Vitals reviewed.        Discharge Disposition:  Home or Self Care    Discharge Medications:   Yordy Hansen   Home Medication Instructions ROCKY:[id number]    Printed on:11/15/17 1153   Medication Information                      albuterol (ACCUNEB) 1.25 MG/3ML nebulizer solution  Take 3 mL by nebulization Every 6 (Six) Hours As Needed for Wheezing.             albuterol (PROVENTIL HFA;VENTOLIN HFA) 108 (90 BASE) MCG/ACT inhaler  Inhale 2 puffs Every 4 (Four) Hours As Needed for Wheezing.             amLODIPine (NORVASC) 10 MG tablet  Take 1 tablet by mouth Every Night.             apixaban (ELIQUIS) 5 MG tablet tablet  Take 1 tablet by mouth " 2 (Two) Times a Day.             cefuroxime (CEFTIN) 500 MG tablet  Take 1 tablet by mouth 2 (Two) Times a Day.             clopidogrel (PLAVIX) 75 MG tablet  Take 1 tablet by mouth Daily.             lisinopril (PRINIVIL,ZESTRIL) 40 MG tablet  Take 1 tablet by mouth Every Morning.             metFORMIN (GLUCOPHAGE) 1000 MG tablet  Take 1 tablet by mouth 2 (Two) Times a Day With Meals.             MethylPREDNISolone (MEDROL, ETELVINA,) 4 MG tablet  Take as directed on package instructions.   Dispense 1 dose pack  No refills             metoprolol succinate XL (TOPROL-XL) 100 MG 24 hr tablet  Take 1 tablet by mouth Every Night.             MICROLET LANCETS misc  USE TO TEST BLOOD SUGAR EVERY DAY AND AS NEEDED             nitroglycerin (NITROSTAT) 0.4 MG SL tablet  Place 1 tablet under the tongue Every 5 (Five) Minutes As Needed for Chest Pain. Take no more than 3 doses in 15 minutes.             oxymetazoline (AFRIN) 0.05 % nasal spray  2 sprays into each nostril 2 (Two) Times a Day.             pantoprazole (PROTONIX) 40 MG EC tablet  Take 1 tablet by mouth Every Night.             pramipexole (MIRAPEX) 1 MG tablet  Take 2 tablets by mouth Every Night. Taking 2mg daily             pravastatin (PRAVACHOL) 80 MG tablet  Take 1 tablet by mouth Every Night.             ranolazine (RANEXA) 1000 MG 12 hr tablet  Take 1 tablet by mouth Every 12 (Twelve) Hours.             sertraline (ZOLOFT) 25 MG tablet  Take 1 tablet by mouth Daily.             traZODone (DESYREL) 300 MG tablet  Take 1 tablet by mouth Every Night.                 Discharge Diet:   Diet Instructions     Diet: Cardiac; Thin       Discharge Diet:  Cardiac   Fluid Consistency:  Thin                 Activity at Discharge:   Activity Instructions     Activity as Tolerated                     Discharge Care Plan/Instructions: Follow-up with PCP within one week of discharge.  Follow-up with cardiology in 2 weeks.    Follow-up Appointments:   Future Appointments  Date  Time Provider Department Center   1/4/2018 2:00 PM Antony Jenkins MD PhD [facility] None       Test Results Pending at Discharge:           This document has been electronically signed by LALA Wick on November 15, 2017 11:54 AM

## 2017-11-15 NOTE — ED TRIAGE NOTES
Pt reports CP that started at approx 2000. He took 3 SL nitro with minor relief. Denies N/V/dizziness. C/o mild SOB.

## 2017-12-12 ENCOUNTER — APPOINTMENT (OUTPATIENT)
Dept: CT IMAGING | Facility: HOSPITAL | Age: 37
End: 2017-12-12

## 2017-12-12 ENCOUNTER — APPOINTMENT (OUTPATIENT)
Dept: GENERAL RADIOLOGY | Facility: HOSPITAL | Age: 37
End: 2017-12-12

## 2017-12-12 ENCOUNTER — HOSPITAL ENCOUNTER (INPATIENT)
Facility: HOSPITAL | Age: 37
LOS: 1 days | Discharge: HOME | End: 2017-12-14
Attending: EMERGENCY MEDICINE | Admitting: FAMILY MEDICINE

## 2017-12-12 DIAGNOSIS — I26.99 OTHER PULMONARY EMBOLISM WITHOUT ACUTE COR PULMONALE, UNSPECIFIED CHRONICITY (HCC): Primary | ICD-10-CM

## 2017-12-12 LAB
ALBUMIN SERPL-MCNC: 4 G/DL (ref 3.4–4.8)
ALBUMIN/GLOB SERPL: 1.1 G/DL (ref 1.1–1.8)
ALP SERPL-CCNC: 79 U/L (ref 38–126)
ALT SERPL W P-5'-P-CCNC: 39 U/L (ref 21–72)
ANION GAP SERPL CALCULATED.3IONS-SCNC: 10 MMOL/L (ref 5–15)
APTT PPP: 30.7 SECONDS (ref 20–40.3)
APTT PPP: 33.8 SECONDS (ref 20–40.3)
AST SERPL-CCNC: 41 U/L (ref 17–59)
BASOPHILS # BLD AUTO: 0.01 10*3/MM3 (ref 0–0.2)
BASOPHILS NFR BLD AUTO: 0.1 % (ref 0–2)
BILIRUB SERPL-MCNC: 0.5 MG/DL (ref 0.2–1.3)
BUN BLD-MCNC: 9 MG/DL (ref 7–21)
BUN/CREAT SERPL: 11.3 (ref 7–25)
CALCIUM SPEC-SCNC: 9 MG/DL (ref 8.4–10.2)
CHLORIDE SERPL-SCNC: 93 MMOL/L (ref 95–110)
CO2 SERPL-SCNC: 30 MMOL/L (ref 22–31)
CREAT BLD-MCNC: 0.8 MG/DL (ref 0.7–1.3)
D-DIMER, QUANTITATIVE (MAD,POW, STR): <270 NG/ML (FEU) (ref 0–470)
DEPRECATED RDW RBC AUTO: 42.6 FL (ref 35.1–43.9)
EOSINOPHIL # BLD AUTO: 0.26 10*3/MM3 (ref 0–0.7)
EOSINOPHIL NFR BLD AUTO: 3.3 % (ref 0–7)
ERYTHROCYTE [DISTWIDTH] IN BLOOD BY AUTOMATED COUNT: 14.3 % (ref 11.5–14.5)
GFR SERPL CREATININE-BSD FRML MDRD: 108 ML/MIN/1.73 (ref 70–162)
GLOBULIN UR ELPH-MCNC: 3.7 GM/DL (ref 2.3–3.5)
GLUCOSE BLD-MCNC: 365 MG/DL (ref 60–100)
GLUCOSE BLDC GLUCOMTR-MCNC: 205 MG/DL (ref 70–130)
GLUCOSE BLDC GLUCOMTR-MCNC: 257 MG/DL (ref 70–130)
GLUCOSE BLDC GLUCOMTR-MCNC: 312 MG/DL (ref 70–130)
HCT VFR BLD AUTO: 39.6 % (ref 39–49)
HGB BLD-MCNC: 13.4 G/DL (ref 13.7–17.3)
HOLD SPECIMEN: NORMAL
IMM GRANULOCYTES # BLD: 0.1 10*3/MM3 (ref 0–0.02)
IMM GRANULOCYTES NFR BLD: 1.3 % (ref 0–0.5)
INR PPP: 0.92 (ref 0.8–1.2)
INR PPP: 0.93 (ref 0.8–1.2)
LYMPHOCYTES # BLD AUTO: 1.19 10*3/MM3 (ref 0.6–4.2)
LYMPHOCYTES NFR BLD AUTO: 15 % (ref 10–50)
MCH RBC QN AUTO: 27.7 PG (ref 26.5–34)
MCHC RBC AUTO-ENTMCNC: 33.8 G/DL (ref 31.5–36.3)
MCV RBC AUTO: 81.8 FL (ref 80–98)
MONOCYTES # BLD AUTO: 0.64 10*3/MM3 (ref 0–0.9)
MONOCYTES NFR BLD AUTO: 8.1 % (ref 0–12)
NEUTROPHILS # BLD AUTO: 5.72 10*3/MM3 (ref 2–8.6)
NEUTROPHILS NFR BLD AUTO: 72.2 % (ref 37–80)
NT-PROBNP SERPL-MCNC: 38.4 PG/ML (ref 0–450)
PLATELET # BLD AUTO: 160 10*3/MM3 (ref 150–450)
PMV BLD AUTO: 9.3 FL (ref 8–12)
POTASSIUM BLD-SCNC: 4.1 MMOL/L (ref 3.5–5.1)
PROT SERPL-MCNC: 7.7 G/DL (ref 6.3–8.6)
PROTHROMBIN TIME: 12.3 SECONDS (ref 11.1–15.3)
PROTHROMBIN TIME: 12.4 SECONDS (ref 11.1–15.3)
RBC # BLD AUTO: 4.84 10*6/MM3 (ref 4.37–5.74)
SODIUM BLD-SCNC: 133 MMOL/L (ref 137–145)
TROPONIN I SERPL-MCNC: <0.012 NG/ML
WBC NRBC COR # BLD: 7.92 10*3/MM3 (ref 3.2–9.8)
WHOLE BLOOD HOLD SPECIMEN: NORMAL

## 2017-12-12 PROCEDURE — 71010 HC CHEST PA OR AP: CPT

## 2017-12-12 PROCEDURE — 99284 EMERGENCY DEPT VISIT MOD MDM: CPT

## 2017-12-12 PROCEDURE — 85610 PROTHROMBIN TIME: CPT | Performed by: EMERGENCY MEDICINE

## 2017-12-12 PROCEDURE — 85730 THROMBOPLASTIN TIME PARTIAL: CPT | Performed by: EMERGENCY MEDICINE

## 2017-12-12 PROCEDURE — 85027 COMPLETE CBC AUTOMATED: CPT | Performed by: FAMILY MEDICINE

## 2017-12-12 PROCEDURE — 93005 ELECTROCARDIOGRAM TRACING: CPT | Performed by: EMERGENCY MEDICINE

## 2017-12-12 PROCEDURE — G0378 HOSPITAL OBSERVATION PER HR: HCPCS

## 2017-12-12 PROCEDURE — 85379 FIBRIN DEGRADATION QUANT: CPT | Performed by: EMERGENCY MEDICINE

## 2017-12-12 PROCEDURE — 71275 CT ANGIOGRAPHY CHEST: CPT

## 2017-12-12 PROCEDURE — 80053 COMPREHEN METABOLIC PANEL: CPT | Performed by: EMERGENCY MEDICINE

## 2017-12-12 PROCEDURE — 25010000002 MORPHINE PER 10 MG: Performed by: FAMILY MEDICINE

## 2017-12-12 PROCEDURE — 85610 PROTHROMBIN TIME: CPT | Performed by: FAMILY MEDICINE

## 2017-12-12 PROCEDURE — 84484 ASSAY OF TROPONIN QUANT: CPT | Performed by: EMERGENCY MEDICINE

## 2017-12-12 PROCEDURE — 82962 GLUCOSE BLOOD TEST: CPT

## 2017-12-12 PROCEDURE — 85730 THROMBOPLASTIN TIME PARTIAL: CPT | Performed by: FAMILY MEDICINE

## 2017-12-12 PROCEDURE — 93010 ELECTROCARDIOGRAM REPORT: CPT | Performed by: INTERNAL MEDICINE

## 2017-12-12 PROCEDURE — 83880 ASSAY OF NATRIURETIC PEPTIDE: CPT | Performed by: EMERGENCY MEDICINE

## 2017-12-12 PROCEDURE — 0 IOPAMIDOL PER 1 ML: Performed by: EMERGENCY MEDICINE

## 2017-12-12 PROCEDURE — 25010000002 HEPARIN (PORCINE) PER 1000 UNITS: Performed by: FAMILY MEDICINE

## 2017-12-12 PROCEDURE — 85025 COMPLETE CBC W/AUTO DIFF WBC: CPT | Performed by: EMERGENCY MEDICINE

## 2017-12-12 PROCEDURE — 63710000001 INSULIN REGULAR HUMAN PER 5 UNITS: Performed by: EMERGENCY MEDICINE

## 2017-12-12 RX ORDER — HEPARIN SODIUM 5000 [USP'U]/ML
46.9 INJECTION, SOLUTION INTRAVENOUS; SUBCUTANEOUS AS NEEDED
Status: DISCONTINUED | OUTPATIENT
Start: 2017-12-12 | End: 2017-12-13

## 2017-12-12 RX ORDER — SODIUM CHLORIDE 0.9 % (FLUSH) 0.9 %
30 SYRINGE (ML) INJECTION ONCE AS NEEDED
Status: DISCONTINUED | OUTPATIENT
Start: 2017-12-12 | End: 2017-12-14 | Stop reason: HOSPADM

## 2017-12-12 RX ORDER — RANOLAZINE 1000 MG/1
TABLET, EXTENDED RELEASE ORAL
COMMUNITY
End: 2017-12-14 | Stop reason: HOSPADM

## 2017-12-12 RX ORDER — TRAZODONE HYDROCHLORIDE 150 MG/1
300 TABLET ORAL NIGHTLY
Status: DISCONTINUED | OUTPATIENT
Start: 2017-12-12 | End: 2017-12-14 | Stop reason: HOSPADM

## 2017-12-12 RX ORDER — SODIUM CHLORIDE 0.9 % (FLUSH) 0.9 %
10 SYRINGE (ML) INJECTION AS NEEDED
Status: DISCONTINUED | OUTPATIENT
Start: 2017-12-12 | End: 2017-12-14 | Stop reason: HOSPADM

## 2017-12-12 RX ORDER — NITROGLYCERIN 0.4 MG/1
TABLET SUBLINGUAL
COMMUNITY
End: 2017-12-14 | Stop reason: HOSPADM

## 2017-12-12 RX ORDER — AMLODIPINE BESYLATE 10 MG/1
10 TABLET ORAL NIGHTLY
Status: DISCONTINUED | OUTPATIENT
Start: 2017-12-12 | End: 2017-12-14 | Stop reason: HOSPADM

## 2017-12-12 RX ORDER — LISINOPRIL 40 MG/1
40 TABLET ORAL EVERY MORNING
Status: DISCONTINUED | OUTPATIENT
Start: 2017-12-13 | End: 2017-12-14 | Stop reason: HOSPADM

## 2017-12-12 RX ORDER — ALBUTEROL SULFATE 2.5 MG/3ML
1.25 SOLUTION RESPIRATORY (INHALATION) EVERY 6 HOURS PRN
Status: DISCONTINUED | OUTPATIENT
Start: 2017-12-12 | End: 2017-12-14 | Stop reason: HOSPADM

## 2017-12-12 RX ORDER — ALUMINA, MAGNESIA, AND SIMETHICONE 2400; 2400; 240 MG/30ML; MG/30ML; MG/30ML
15 SUSPENSION ORAL ONCE
Status: COMPLETED | OUTPATIENT
Start: 2017-12-12 | End: 2017-12-12

## 2017-12-12 RX ORDER — MORPHINE SULFATE 1 MG/ML
1 INJECTION, SOLUTION EPIDURAL; INTRATHECAL; INTRAVENOUS EVERY 4 HOURS PRN
Status: DISCONTINUED | OUTPATIENT
Start: 2017-12-12 | End: 2017-12-13

## 2017-12-12 RX ORDER — PRAMIPEXOLE DIHYDROCHLORIDE 1 MG/1
2 TABLET ORAL NIGHTLY
Status: DISCONTINUED | OUTPATIENT
Start: 2017-12-12 | End: 2017-12-14 | Stop reason: HOSPADM

## 2017-12-12 RX ORDER — SERTRALINE HYDROCHLORIDE 25 MG/1
25 TABLET, FILM COATED ORAL DAILY
Status: DISCONTINUED | OUTPATIENT
Start: 2017-12-13 | End: 2017-12-14 | Stop reason: HOSPADM

## 2017-12-12 RX ORDER — SODIUM CHLORIDE 0.9 % (FLUSH) 0.9 %
1-10 SYRINGE (ML) INJECTION AS NEEDED
Status: DISCONTINUED | OUTPATIENT
Start: 2017-12-12 | End: 2017-12-14 | Stop reason: HOSPADM

## 2017-12-12 RX ORDER — ATORVASTATIN CALCIUM 20 MG/1
20 TABLET, FILM COATED ORAL DAILY
Status: DISCONTINUED | OUTPATIENT
Start: 2017-12-13 | End: 2017-12-14 | Stop reason: HOSPADM

## 2017-12-12 RX ORDER — DEXTROSE MONOHYDRATE 25 G/50ML
25 INJECTION, SOLUTION INTRAVENOUS
Status: DISCONTINUED | OUTPATIENT
Start: 2017-12-12 | End: 2017-12-14 | Stop reason: HOSPADM

## 2017-12-12 RX ORDER — ALBUTEROL SULFATE 90 UG/1
AEROSOL, METERED RESPIRATORY (INHALATION)
COMMUNITY
End: 2017-12-22 | Stop reason: HOSPADM

## 2017-12-12 RX ORDER — NICOTINE POLACRILEX 4 MG
15 LOZENGE BUCCAL
Status: DISCONTINUED | OUTPATIENT
Start: 2017-12-12 | End: 2017-12-14 | Stop reason: HOSPADM

## 2017-12-12 RX ORDER — SODIUM CHLORIDE 9 MG/ML
30 INJECTION, SOLUTION INTRAVENOUS CONTINUOUS PRN
Status: DISCONTINUED | OUTPATIENT
Start: 2017-12-12 | End: 2017-12-14 | Stop reason: HOSPADM

## 2017-12-12 RX ORDER — CLOPIDOGREL BISULFATE 75 MG/1
75 TABLET ORAL DAILY
Status: DISCONTINUED | OUTPATIENT
Start: 2017-12-13 | End: 2017-12-13

## 2017-12-12 RX ORDER — HEPARIN SODIUM 5000 [USP'U]/ML
46.9 INJECTION, SOLUTION INTRAVENOUS; SUBCUTANEOUS ONCE
Status: COMPLETED | OUTPATIENT
Start: 2017-12-12 | End: 2017-12-12

## 2017-12-12 RX ORDER — IPRATROPIUM BROMIDE AND ALBUTEROL SULFATE 2.5; .5 MG/3ML; MG/3ML
1.5 SOLUTION RESPIRATORY (INHALATION)
Status: DISCONTINUED | OUTPATIENT
Start: 2017-12-13 | End: 2017-12-14 | Stop reason: HOSPADM

## 2017-12-12 RX ORDER — IPRATROPIUM BROMIDE AND ALBUTEROL SULFATE 2.5; .5 MG/3ML; MG/3ML
SOLUTION RESPIRATORY (INHALATION)
COMMUNITY
End: 2018-01-15

## 2017-12-12 RX ORDER — HEPARIN SODIUM 5000 [USP'U]/ML
40 INJECTION, SOLUTION INTRAVENOUS; SUBCUTANEOUS AS NEEDED
Status: DISCONTINUED | OUTPATIENT
Start: 2017-12-12 | End: 2017-12-13

## 2017-12-12 RX ORDER — METOPROLOL SUCCINATE 200 MG/1
200 TABLET, EXTENDED RELEASE ORAL
Status: ON HOLD | COMMUNITY
Start: 2017-11-22 | End: 2020-10-07

## 2017-12-12 RX ORDER — HYDRALAZINE HYDROCHLORIDE 20 MG/ML
20 INJECTION INTRAMUSCULAR; INTRAVENOUS EVERY 6 HOURS PRN
Status: DISCONTINUED | OUTPATIENT
Start: 2017-12-12 | End: 2017-12-14 | Stop reason: HOSPADM

## 2017-12-12 RX ORDER — ACETAMINOPHEN 325 MG/1
650 TABLET ORAL EVERY 4 HOURS PRN
Status: DISCONTINUED | OUTPATIENT
Start: 2017-12-12 | End: 2017-12-14 | Stop reason: HOSPADM

## 2017-12-12 RX ORDER — NALOXONE HCL 0.4 MG/ML
0.4 VIAL (ML) INJECTION
Status: DISCONTINUED | OUTPATIENT
Start: 2017-12-12 | End: 2017-12-13

## 2017-12-12 RX ORDER — METOPROLOL SUCCINATE 100 MG/1
200 TABLET, EXTENDED RELEASE ORAL
Status: DISCONTINUED | OUTPATIENT
Start: 2017-12-13 | End: 2017-12-14 | Stop reason: HOSPADM

## 2017-12-12 RX ORDER — RANOLAZINE 500 MG/1
1000 TABLET, EXTENDED RELEASE ORAL EVERY 12 HOURS SCHEDULED
Status: DISCONTINUED | OUTPATIENT
Start: 2017-12-12 | End: 2017-12-14 | Stop reason: HOSPADM

## 2017-12-12 RX ADMIN — ALUMINUM HYDROXIDE, MAGNESIUM HYDROXIDE, AND DIMETHICONE 15 ML: 400; 400; 40 SUSPENSION ORAL at 17:11

## 2017-12-12 RX ADMIN — RANOLAZINE 1000 MG: 500 TABLET, FILM COATED, EXTENDED RELEASE ORAL at 22:50

## 2017-12-12 RX ADMIN — LIDOCAINE HYDROCHLORIDE 15 ML: 20 SOLUTION ORAL; TOPICAL at 17:11

## 2017-12-12 RX ADMIN — HEPARIN SODIUM 10000 UNITS: 5000 INJECTION, SOLUTION INTRAVENOUS; SUBCUTANEOUS at 23:49

## 2017-12-12 RX ADMIN — IOPAMIDOL 87 ML: 755 INJECTION, SOLUTION INTRAVENOUS at 18:18

## 2017-12-12 RX ADMIN — HEPARIN SODIUM 7 UNITS/KG/HR: 10000 INJECTION, SOLUTION INTRAVENOUS at 23:51

## 2017-12-12 RX ADMIN — HUMAN INSULIN 2 UNITS: 100 INJECTION, SOLUTION SUBCUTANEOUS at 18:24

## 2017-12-12 RX ADMIN — Medication 1 MG: at 22:46

## 2017-12-12 RX ADMIN — PRAMIPEXOLE DIHYDROCHLORIDE 2 MG: 1 TABLET ORAL at 22:49

## 2017-12-12 RX ADMIN — AMLODIPINE BESYLATE 10 MG: 10 TABLET ORAL at 22:50

## 2017-12-12 RX ADMIN — TRAZODONE HYDROCHLORIDE 300 MG: 150 TABLET ORAL at 22:49

## 2017-12-12 NOTE — ED PROVIDER NOTES
"Subjective   History of Present Illness  Patient is a 37-year-old white male who presents here with chest pain shortness of breath.  The right chest pain is right sternal border nonradiating.  Worse when he takes a deep breath.  He says increasing shortness of breath over the past few days.  No real orthopnea but there is notable dyspnea on exertion.  Is also concerned that he is gaining some weight as he says he's gained about 15 pounds in the past few days and he thinks it may be retaining water.    Pain is been constant and unremitting for 3 days both in character and intensity.     History of 3 PEs on the right side last year.  Also had cardiac stents within the last year.  He has a history of factor II mutation.  Review of Systems   Constitutional: Positive for activity change and fatigue. Negative for appetite change, chills, diaphoresis, fever and unexpected weight change.   Respiratory: Positive for shortness of breath. Negative for apnea, cough, choking, chest tightness, wheezing and stridor.    Cardiovascular: Positive for chest pain and leg swelling. Negative for palpitations.   All other systems reviewed and are negative.      Past Medical History:   Diagnosis Date   • Allergic rhinitis    • Asthma    • Chest pain    • CHF (congestive heart failure)    • Chronic back pain    • Coronary artery disease     2 stents.  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\"   • GERD (gastroesophageal reflux disease)    • Hyperlipidemia    • Hypertension    • Low back pain    • Morbid obesity    • Pulmonary embolism    • RLS (restless legs syndrome)    • Seizures    • Sleep apnea        Allergies   Allergen Reactions   • Glipizide Other (See Comments)     Hallucinations     • Phenergan [Promethazine Hcl] Other (See Comments)     Burning veins   • Risperdal [Risperidone] Other (See Comments)     Slurred speech   • Tramadol Other (See Comments)     seizures "   • Reglan [Metoclopramide] Anxiety       Past Surgical History:   Procedure Laterality Date   • ADENOIDECTOMY     • CARDIAC CATHETERIZATION N/A 3/15/2017    Procedure: Left Heart Cath;  Surgeon: Edwige Briceno MD;  Location: [facility] CATH INVASIVE LOCATION;  Service:    • CARDIAC CATHETERIZATION N/A 3/15/2017    Procedure: Stent SOPHIA coronary;  Surgeon: Edwige Briceno MD;  Location: [facility] CATH INVASIVE LOCATION;  Service:    • CHOLECYSTECTOMY     • CORONARY ANGIOPLASTY WITH STENT PLACEMENT     • IL RT/LT HEART CATHETERS N/A 3/15/2017    Procedure: Percutaneous Coronary Intervention;  Surgeon: Edwige Briceno MD;  Location: [facility] CATH INVASIVE LOCATION;  Service: Cardiovascular   • TONSILLECTOMY     • TRANSESOPHAGEAL ECHOCARDIOGRAM (MONICA)         Family History   Problem Relation Age of Onset   • Cancer Other    • Coronary artery disease Other    • Diabetes Other    • Heart disease Other    • Hypertension Other        Social History     Social History   • Marital status:      Spouse name: Cori   • Number of children: 0   • Years of education: N/A     Occupational History   • Disabled      Social History Main Topics   • Smoking status: Former Smoker     Quit date: 1997   • Smokeless tobacco: Former User     Types: Snuff     Quit date: 2017   • Alcohol use No   • Drug use: No   • Sexual activity: Defer     Other Topics Concern   • None     Social History Narrative           Objective   Physical Exam   Constitutional: He is oriented to person, place, and time.   Morbidly obese   HENT:   Head: Normocephalic and atraumatic.   Mouth/Throat: Oropharynx is clear and moist.   Eyes: Conjunctivae and EOM are normal. Pupils are equal, round, and reactive to light.   Neck: Normal range of motion. No JVD present.   Musculoskeletal: Normal range of motion. He exhibits edema (2+ lower extremity edema). He exhibits no tenderness.   Neurological: He is alert and oriented to person, place, and time. He exhibits normal muscle tone.    Skin: Skin is warm and dry. No rash noted. No erythema. No pallor.   Psychiatric: He has a normal mood and affect. His behavior is normal. Judgment and thought content normal.   Nursing note and vitals reviewed.      Procedures         ED Course  ED Course        Radiologist called me about patient's CT findings.  He noticed a large clot in the right mainstem bronchus but it appeared to be cannulated and there is blood through it.  He did note however that he said there is a fairly significant clot burden of multiple PEs on the left and the right.  Of note patient has never had a documented pulmonary embolism on the left.  Patient is on Eliquis and Plavix in which she says is compliant.  My concern is that this anticoagulation may not be the right fit for him.  As he is symptomatic with dyspnea on exertion and chest pain I have asked Dr. sierra to admit this patient for further evaluation and treatment.  He will admit to the hospitalist service.          MDM  Number of Diagnoses or Management Options     Amount and/or Complexity of Data Reviewed  Clinical lab tests: ordered and reviewed  Tests in the radiology section of CPT®: ordered and reviewed  Tests in the medicine section of CPT®: reviewed and ordered  Decide to obtain previous medical records or to obtain history from someone other than the patient: yes  Review and summarize past medical records: yes  Independent visualization of images, tracings, or specimens: yes    Risk of Complications, Morbidity, and/or Mortality  Presenting problems: high  Diagnostic procedures: high  Management options: high        Final diagnoses:   Other pulmonary embolism without acute cor pulmonale, unspecified chronicity            Santosh Sparrow MD  12/12/17 3352

## 2017-12-13 ENCOUNTER — APPOINTMENT (OUTPATIENT)
Dept: ULTRASOUND IMAGING | Facility: HOSPITAL | Age: 37
End: 2017-12-13

## 2017-12-13 ENCOUNTER — APPOINTMENT (OUTPATIENT)
Dept: CARDIOLOGY | Facility: HOSPITAL | Age: 37
End: 2017-12-13
Attending: FAMILY MEDICINE

## 2017-12-13 LAB
ANION GAP SERPL CALCULATED.3IONS-SCNC: 10 MMOL/L (ref 5–15)
APTT PPP: 33.6 SECONDS (ref 20–40.3)
APTT PPP: 37.4 SECONDS (ref 20–40.3)
BH CV ECHO MEAS - ACS: 1.8 CM
BH CV ECHO MEAS - AO ISTHMUS: 2.3 CM
BH CV ECHO MEAS - AO MAX PG (FULL): -0.86 MMHG
BH CV ECHO MEAS - AO MAX PG: 10.6 MMHG
BH CV ECHO MEAS - AO MEAN PG (FULL): -0.72 MMHG
BH CV ECHO MEAS - AO MEAN PG: 5.5 MMHG
BH CV ECHO MEAS - AO ROOT AREA (BSA CORRECTED): 0.83
BH CV ECHO MEAS - AO ROOT AREA: 4.9 CM^2
BH CV ECHO MEAS - AO ROOT DIAM: 2.5 CM
BH CV ECHO MEAS - AO V2 MAX: 162.9 CM/SEC
BH CV ECHO MEAS - AO V2 MEAN: 107.8 CM/SEC
BH CV ECHO MEAS - AO V2 VTI: 25.4 CM
BH CV ECHO MEAS - ASC AORTA: 2.6 CM
BH CV ECHO MEAS - AVA(I,A): 5.5 CM^2
BH CV ECHO MEAS - AVA(I,D): 5.5 CM^2
BH CV ECHO MEAS - AVA(V,A): 5.3 CM^2
BH CV ECHO MEAS - AVA(V,D): 5.3 CM^2
BH CV ECHO MEAS - BSA(HAYCOCK): 3.4 M^2
BH CV ECHO MEAS - BSA: 3 M^2
BH CV ECHO MEAS - BZI_BMI: 65.4 KILOGRAMS/M^2
BH CV ECHO MEAS - BZI_METRIC_HEIGHT: 180.3 CM
BH CV ECHO MEAS - BZI_METRIC_WEIGHT: 212.7 KG
BH CV ECHO MEAS - EDV(CUBED): 222.2 ML
BH CV ECHO MEAS - EDV(TEICH): 183.9 ML
BH CV ECHO MEAS - EF(CUBED): 59.9 %
BH CV ECHO MEAS - EF(TEICH): 50.6 %
BH CV ECHO MEAS - ESV(CUBED): 89.2 ML
BH CV ECHO MEAS - ESV(TEICH): 90.9 ML
BH CV ECHO MEAS - FS: 26.2 %
BH CV ECHO MEAS - IVS/LVPW: 0.97
BH CV ECHO MEAS - IVSD: 1.5 CM
BH CV ECHO MEAS - LA DIMENSION: 3.3 CM
BH CV ECHO MEAS - LA/AO: 1.3
BH CV ECHO MEAS - LV MASS(C)D: 453.4 GRAMS
BH CV ECHO MEAS - LV MASS(C)DI: 149.7 GRAMS/M^2
BH CV ECHO MEAS - LV MAX PG: 11.5 MMHG
BH CV ECHO MEAS - LV MEAN PG: 6.2 MMHG
BH CV ECHO MEAS - LV V1 MAX: 169.4 CM/SEC
BH CV ECHO MEAS - LV V1 MEAN: 110.9 CM/SEC
BH CV ECHO MEAS - LV V1 VTI: 27.3 CM
BH CV ECHO MEAS - LVIDD: 6.1 CM
BH CV ECHO MEAS - LVIDS: 4.5 CM
BH CV ECHO MEAS - LVOT AREA (M): 4.9 CM^2
BH CV ECHO MEAS - LVOT AREA: 5.1 CM^2
BH CV ECHO MEAS - LVOT DIAM: 2.5 CM
BH CV ECHO MEAS - LVPWD: 1.6 CM
BH CV ECHO MEAS - MV A MAX VEL: 77.1 CM/SEC
BH CV ECHO MEAS - MV DEC SLOPE: 662.5 CM/SEC^2
BH CV ECHO MEAS - MV E MAX VEL: 85.9 CM/SEC
BH CV ECHO MEAS - MV E/A: 1.1
BH CV ECHO MEAS - MV MAX PG: 4.6 MMHG
BH CV ECHO MEAS - MV MEAN PG: 2.3 MMHG
BH CV ECHO MEAS - MV P1/2T MAX VEL: 108 CM/SEC
BH CV ECHO MEAS - MV P1/2T: 47.7 MSEC
BH CV ECHO MEAS - MV V2 MAX: 106.8 CM/SEC
BH CV ECHO MEAS - MV V2 MEAN: 72.5 CM/SEC
BH CV ECHO MEAS - MV V2 VTI: 27.8 CM
BH CV ECHO MEAS - MVA P1/2T LCG: 2 CM^2
BH CV ECHO MEAS - MVA(P1/2T): 4.6 CM^2
BH CV ECHO MEAS - MVA(VTI): 5 CM^2
BH CV ECHO MEAS - PA MAX PG: 9.2 MMHG
BH CV ECHO MEAS - PA V2 MAX: 152 CM/SEC
BH CV ECHO MEAS - PI END-D VEL: 65.2 CM/SEC
BH CV ECHO MEAS - RVDD: 2.8 CM
BH CV ECHO MEAS - SI(AO): 41.2 ML/M^2
BH CV ECHO MEAS - SI(CUBED): 43.9 ML/M^2
BH CV ECHO MEAS - SI(LVOT): 45.8 ML/M^2
BH CV ECHO MEAS - SI(TEICH): 30.7 ML/M^2
BH CV ECHO MEAS - SV(AO): 125 ML
BH CV ECHO MEAS - SV(CUBED): 133 ML
BH CV ECHO MEAS - SV(LVOT): 138.8 ML
BH CV ECHO MEAS - SV(TEICH): 93 ML
BH CV ECHO MEAS - TR MAX VEL: 139.8 CM/SEC
BUN BLD-MCNC: 8 MG/DL (ref 7–21)
BUN/CREAT SERPL: 10.5 (ref 7–25)
CALCIUM SPEC-SCNC: 9.1 MG/DL (ref 8.4–10.2)
CHLORIDE SERPL-SCNC: 95 MMOL/L (ref 95–110)
CO2 SERPL-SCNC: 29 MMOL/L (ref 22–31)
CREAT BLD-MCNC: 0.76 MG/DL (ref 0.7–1.3)
DEPRECATED RDW RBC AUTO: 42.8 FL (ref 35.1–43.9)
ERYTHROCYTE [DISTWIDTH] IN BLOOD BY AUTOMATED COUNT: 14.4 % (ref 11.5–14.5)
GFR SERPL CREATININE-BSD FRML MDRD: 114 ML/MIN/1.73
GLUCOSE BLD-MCNC: 200 MG/DL (ref 60–100)
GLUCOSE BLDC GLUCOMTR-MCNC: 194 MG/DL (ref 70–130)
GLUCOSE BLDC GLUCOMTR-MCNC: 249 MG/DL (ref 70–130)
GLUCOSE BLDC GLUCOMTR-MCNC: 284 MG/DL (ref 70–130)
GLUCOSE BLDC GLUCOMTR-MCNC: 295 MG/DL (ref 70–130)
GLUCOSE BLDC GLUCOMTR-MCNC: 340 MG/DL (ref 70–130)
HCT VFR BLD AUTO: 38.4 % (ref 39–49)
HGB BLD-MCNC: 13 G/DL (ref 13.7–17.3)
LV EF 2D ECHO EST: 60 %
MAXIMAL PREDICTED HEART RATE: 181 BPM
MCH RBC QN AUTO: 27.7 PG (ref 26.5–34)
MCHC RBC AUTO-ENTMCNC: 33.9 G/DL (ref 31.5–36.3)
MCV RBC AUTO: 81.7 FL (ref 80–98)
PLATELET # BLD AUTO: 175 10*3/MM3 (ref 150–450)
PMV BLD AUTO: 9.5 FL (ref 8–12)
POTASSIUM BLD-SCNC: 3.8 MMOL/L (ref 3.5–5.1)
RBC # BLD AUTO: 4.7 10*6/MM3 (ref 4.37–5.74)
SODIUM BLD-SCNC: 134 MMOL/L (ref 137–145)
STRESS TARGET HR: 154 BPM
WBC NRBC COR # BLD: 7.05 10*3/MM3 (ref 3.2–9.8)
WHOLE BLOOD HOLD SPECIMEN: NORMAL

## 2017-12-13 PROCEDURE — 63710000001 INSULIN ASPART PER 5 UNITS: Performed by: FAMILY MEDICINE

## 2017-12-13 PROCEDURE — 93306 TTE W/DOPPLER COMPLETE: CPT | Performed by: INTERNAL MEDICINE

## 2017-12-13 PROCEDURE — 85730 THROMBOPLASTIN TIME PARTIAL: CPT | Performed by: FAMILY MEDICINE

## 2017-12-13 PROCEDURE — 25010000002 HEPARIN (PORCINE) PER 1000 UNITS: Performed by: NURSE PRACTITIONER

## 2017-12-13 PROCEDURE — 94799 UNLISTED PULMONARY SVC/PX: CPT

## 2017-12-13 PROCEDURE — 93306 TTE W/DOPPLER COMPLETE: CPT

## 2017-12-13 PROCEDURE — 82962 GLUCOSE BLOOD TEST: CPT

## 2017-12-13 PROCEDURE — 93970 EXTREMITY STUDY: CPT

## 2017-12-13 PROCEDURE — 80048 BASIC METABOLIC PNL TOTAL CA: CPT | Performed by: FAMILY MEDICINE

## 2017-12-13 PROCEDURE — 25010000002 MORPHINE PER 10 MG: Performed by: FAMILY MEDICINE

## 2017-12-13 PROCEDURE — 94640 AIRWAY INHALATION TREATMENT: CPT

## 2017-12-13 PROCEDURE — 25010000002 MORPHINE PER 10 MG: Performed by: INTERNAL MEDICINE

## 2017-12-13 PROCEDURE — 94760 N-INVAS EAR/PLS OXIMETRY 1: CPT

## 2017-12-13 PROCEDURE — 25010000002 HEPARIN (PORCINE) PER 1000 UNITS: Performed by: FAMILY MEDICINE

## 2017-12-13 RX ORDER — NALOXONE HCL 0.4 MG/ML
0.4 VIAL (ML) INJECTION
Status: DISCONTINUED | OUTPATIENT
Start: 2017-12-13 | End: 2017-12-14 | Stop reason: HOSPADM

## 2017-12-13 RX ORDER — MORPHINE SULFATE 1 MG/ML
1 INJECTION, SOLUTION EPIDURAL; INTRATHECAL; INTRAVENOUS
Status: DISCONTINUED | OUTPATIENT
Start: 2017-12-13 | End: 2017-12-14 | Stop reason: HOSPADM

## 2017-12-13 RX ORDER — MORPHINE SULFATE 1 MG/ML
1 INJECTION, SOLUTION EPIDURAL; INTRATHECAL; INTRAVENOUS ONCE
Status: COMPLETED | OUTPATIENT
Start: 2017-12-13 | End: 2017-12-13

## 2017-12-13 RX ORDER — HEPARIN SODIUM 5000 [USP'U]/ML
40 INJECTION, SOLUTION INTRAVENOUS; SUBCUTANEOUS AS NEEDED
Status: DISPENSED | OUTPATIENT
Start: 2017-12-13 | End: 2017-12-13

## 2017-12-13 RX ORDER — ISOSORBIDE MONONITRATE 120 MG/1
120 TABLET, EXTENDED RELEASE ORAL DAILY
COMMUNITY
End: 2018-01-22 | Stop reason: SDUPTHER

## 2017-12-13 RX ORDER — HEPARIN SODIUM 5000 [USP'U]/ML
46.9 INJECTION, SOLUTION INTRAVENOUS; SUBCUTANEOUS AS NEEDED
Status: ACTIVE | OUTPATIENT
Start: 2017-12-13 | End: 2017-12-13

## 2017-12-13 RX ADMIN — AMLODIPINE BESYLATE 10 MG: 10 TABLET ORAL at 20:58

## 2017-12-13 RX ADMIN — INSULIN ASPART 6 UNITS: 100 INJECTION, SOLUTION INTRAVENOUS; SUBCUTANEOUS at 20:59

## 2017-12-13 RX ADMIN — Medication 1 MG: at 11:36

## 2017-12-13 RX ADMIN — Medication 1 MG: at 21:52

## 2017-12-13 RX ADMIN — METOPROLOL SUCCINATE 200 MG: 100 TABLET, EXTENDED RELEASE ORAL at 08:58

## 2017-12-13 RX ADMIN — Medication 1 MG: at 08:03

## 2017-12-13 RX ADMIN — PRAMIPEXOLE DIHYDROCHLORIDE 2 MG: 1 TABLET ORAL at 20:58

## 2017-12-13 RX ADMIN — INSULIN ASPART 4 UNITS: 100 INJECTION, SOLUTION INTRAVENOUS; SUBCUTANEOUS at 17:28

## 2017-12-13 RX ADMIN — RANOLAZINE 1000 MG: 500 TABLET, FILM COATED, EXTENDED RELEASE ORAL at 08:58

## 2017-12-13 RX ADMIN — HEPARIN SODIUM 8500 UNITS: 5000 INJECTION, SOLUTION INTRAVENOUS; SUBCUTANEOUS at 15:08

## 2017-12-13 RX ADMIN — ATORVASTATIN CALCIUM 20 MG: 20 TABLET, FILM COATED ORAL at 08:58

## 2017-12-13 RX ADMIN — HEPARIN SODIUM 11.03 UNITS/KG/HR: 10000 INJECTION, SOLUTION INTRAVENOUS at 13:16

## 2017-12-13 RX ADMIN — Medication 1 MG: at 16:51

## 2017-12-13 RX ADMIN — RANOLAZINE 1000 MG: 500 TABLET, FILM COATED, EXTENDED RELEASE ORAL at 20:58

## 2017-12-13 RX ADMIN — INSULIN ASPART 6 UNITS: 100 INJECTION, SOLUTION INTRAVENOUS; SUBCUTANEOUS at 11:33

## 2017-12-13 RX ADMIN — INSULIN ASPART 2 UNITS: 100 INJECTION, SOLUTION INTRAVENOUS; SUBCUTANEOUS at 08:56

## 2017-12-13 RX ADMIN — SERTRALINE HYDROCHLORIDE 25 MG: 25 TABLET ORAL at 08:58

## 2017-12-13 RX ADMIN — RIVAROXABAN 15 MG: 15 TABLET, FILM COATED ORAL at 17:28

## 2017-12-13 RX ADMIN — TRAZODONE HYDROCHLORIDE 300 MG: 150 TABLET ORAL at 20:58

## 2017-12-13 RX ADMIN — IPRATROPIUM BROMIDE AND ALBUTEROL SULFATE 1.5 ML: .5; 3 SOLUTION RESPIRATORY (INHALATION) at 06:55

## 2017-12-13 RX ADMIN — IPRATROPIUM BROMIDE AND ALBUTEROL SULFATE 1.5 ML: .5; 3 SOLUTION RESPIRATORY (INHALATION) at 12:58

## 2017-12-13 RX ADMIN — HEPARIN SODIUM 10000 UNITS: 5000 INJECTION, SOLUTION INTRAVENOUS; SUBCUTANEOUS at 07:15

## 2017-12-13 RX ADMIN — Medication 1 MG: at 01:22

## 2017-12-13 RX ADMIN — IPRATROPIUM BROMIDE AND ALBUTEROL SULFATE 1.5 ML: .5; 3 SOLUTION RESPIRATORY (INHALATION) at 19:58

## 2017-12-13 RX ADMIN — LISINOPRIL 40 MG: 40 TABLET ORAL at 06:43

## 2017-12-13 NOTE — PLAN OF CARE
Problem: Patient Care Overview (Adult)  Goal: Plan of Care Review  Outcome: Ongoing (interventions implemented as appropriate)    12/13/17 9016   Coping/Psychosocial Response Interventions   Plan Of Care Reviewed With patient   Patient Care Overview   Progress improving   Outcome Evaluation   Outcome Summary/Follow up Plan Patient still complains of chest pain from PE, Cardiothoracic came by to see him today and is going to restart the patient on xeralto and discontinue hep drip 2 hrs after initial dose.        Goal: Adult Individualization and Mutuality  Outcome: Ongoing (interventions implemented as appropriate)  Goal: Discharge Needs Assessment  Outcome: Ongoing (interventions implemented as appropriate)    Problem: Respiratory Insufficiency (Adult)  Goal: Identify Related Risk Factors and Signs and Symptoms  Outcome: Ongoing (interventions implemented as appropriate)  Goal: Acid/Base Balance  Outcome: Ongoing (interventions implemented as appropriate)  Goal: Effective Ventilation  Outcome: Ongoing (interventions implemented as appropriate)    Problem: VTE, DVT and PE (Adult)  Goal: Signs and Symptoms of Listed Potential Problems Will be Absent or Manageable (VTE, DVT and PE)  Outcome: Ongoing (interventions implemented as appropriate)

## 2017-12-13 NOTE — CONSULTS
CVTS CONSULTATION  CVTS/Dr Cassidy has been requested to see pt in consultation by Hospitalist Service.   Patient Care Team:  LALA Barajas as PCP - General (Nurse Practitioner)    Chief complaint:  Chest pain worse with breathing with SOA    Subjective     History of Present Illness:  37 y.o.morbid obese  male with known hypercoagulable syndrome Factor II DNA positive  presented with pleuritic chest pain and SOA.  Seen previously in 3/28/17 for RLE PE.  Was started and well managed on Xarelto.  One month ago his insurance switched him to Eliquis.  He denies any change in activity.  Four days ago he developed SOA and pleuritic CP.  Presented to ED.  CTA Pulmonary demonstrated:  Bilateral PE which was not present on 11/14/17 CTA.  He denies any bleeding or bruising issues.  He stopped smoking June 2016 .  ECHO demonstrated EF 60% without RV strain.  Since previously seen patient has 2 hospital admission with cardiac CP and Plavix was restarted.  Denies any lower extremity edema or pain.    No other associated symptoms or modifying factors.    The following portions of the patient's history were reviewed and updated as appropriate: allergies, current medications, past family history, past medical history, past social history, past surgical history and problem list.  Recent images independently reviewed.  Available laboratory values reviewed.    Review of Systems   Constitutional: Negative for activity change, appetite change, fatigue and fever.        Denies use of power tools, electric knives, chain saws, or conttact sports.  Shaves with safety razor.  Aware to use soft tooth brush and waxed floss.  No recent falls.  Denies:  GIB, GUB, or Seizures.     HENT: Negative for hearing loss, nosebleeds and voice change.    Eyes: Negative for visual disturbance.   Respiratory: Positive for chest tightness and shortness of breath. Negative for apnea, cough, wheezing and stridor.         Rubbing sharp pain with deep  "inspiration    Cardiovascular: Positive for chest pain (as above ). Negative for leg swelling.   Gastrointestinal: Negative for abdominal pain, blood in stool and nausea.   Genitourinary: Negative for hematuria.   Musculoskeletal: Positive for gait problem. Negative for back pain.   Skin: Negative for color change, pallor and rash.   Allergic/Immunologic: Negative for environmental allergies.   Neurological: Negative for dizziness, seizures, syncope and headaches.   Hematological: Does not bruise/bleed easily.   Psychiatric/Behavioral: Negative for behavioral problems.        Past Medical History:   Diagnosis Date   • Allergic rhinitis    • Asthma    • Chest pain    • CHF (congestive heart failure)    • Chronic back pain    • Coronary artery disease     2 stents.  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\"   • GERD (gastroesophageal reflux disease)    • Hyperlipidemia    • Hypertension    • Low back pain    • Morbid obesity    • Pulmonary embolism    • RLS (restless legs syndrome)    • Seizures    • Sleep apnea      Past Surgical History:   Procedure Laterality Date   • ADENOIDECTOMY     • CARDIAC CATHETERIZATION N/A 3/15/2017    Procedure: Left Heart Cath;  Surgeon: Edwige Dillard MD;  Location: [facility] INVASIVE LOCATION;  Service:    • CARDIAC CATHETERIZATION N/A 3/15/2017    Procedure: Stent SOPHIA coronary;  Surgeon: Edwige Dillard MD;  Location: [facility] CATH INVASIVE LOCATION;  Service:    • CHOLECYSTECTOMY     • CORONARY ANGIOPLASTY WITH STENT PLACEMENT     • VT RT/LT HEART CATHETERS N/A 3/15/2017    Procedure: Percutaneous Coronary Intervention;  Surgeon: Edwige Dillard MD;  Location: [facility] CATH INVASIVE LOCATION;  Service: Cardiovascular   • TONSILLECTOMY     • TRANSESOPHAGEAL ECHOCARDIOGRAM (MONICA)       Family History   Problem Relation Age of Onset   • Cancer Other    • Coronary artery disease Other    • Diabetes Other    • Heart disease Other    • " Hypertension Other      Social History   Substance Use Topics   • Smoking status: Former Smoker     Quit date: 1997   • Smokeless tobacco: Former User     Types: Snuff     Quit date: 2017   • Alcohol use No     Prescriptions Prior to Admission   Medication Sig Dispense Refill Last Dose   • albuterol (ACCUNEB) 1.25 MG/3ML nebulizer solution Take 3 mL by nebulization Every 6 (Six) Hours As Needed for Wheezing. 100 vial 0    • albuterol (PROVENTIL HFA) 108 (90 Base) MCG/ACT inhaler EVERY FOUR HOURS as needed for SOB/WHEEZING      • albuterol (PROVENTIL HFA;VENTOLIN HFA) 108 (90 BASE) MCG/ACT inhaler Inhale 2 puffs Every 4 (Four) Hours As Needed for Wheezing.   Taking   • amLODIPine (NORVASC) 10 MG tablet Take 1 tablet by mouth Every Night. 30 tablet 5    • apixaban (ELIQUIS) 5 MG tablet tablet Take 1 tablet by mouth 2 (Two) Times a Day. 60 tablet 6    • apixaban (ELIQUIS) 5 MG tablet tablet TWICE DAILY      • clopidogrel (PLAVIX) 75 MG tablet Take 1 tablet by mouth Daily. 30 tablet 3    • ipratropium-albuterol (DUO-NEB) 0.5-2.5 mg/mL nebulizer EVERY FOUR HOURS as needed for SHORTNESS OF BREATH      • lisinopril (PRINIVIL,ZESTRIL) 40 MG tablet Take 1 tablet by mouth Every Morning. 30 tablet 5    • metFORMIN (GLUCOPHAGE) 1000 MG tablet Take 1 tablet by mouth 2 (Two) Times a Day With Meals. 60 tablet 3    • metFORMIN (GLUCOPHAGE) 1000 MG tablet TWICE A DAY      • metFORMIN (GLUCOPHAGE) 1000 MG tablet Take 1,000 mg by mouth.      • metoprolol succinate XL (TOPROL-XL) 200 MG 24 hr tablet Take 200 mg by mouth.      • MICROLET LANCETS misc USE TO TEST BLOOD SUGAR EVERY DAY AND AS NEEDED   More than a month at Unknown time   • nitroglycerin (NITROSTAT) 0.4 MG SL tablet Place 1 tablet under the tongue Every 5 (Five) Minutes As Needed for Chest Pain. Take no more than 3 doses in 15 minutes. 100 tablet 12    • pantoprazole (PROTONIX) 40 MG EC tablet Take 1 tablet by mouth Every Night. 30 tablet 5    • pramipexole (MIRAPEX) 1 MG  "tablet Take 2 tablets by mouth Every Night. Taking 2mg daily 60 tablet 5    • pravastatin (PRAVACHOL) 80 MG tablet Take 1 tablet by mouth Every Night. 30 tablet 5    • ranolazine (RANEXA) 1000 MG 12 hr tablet Take 1 tablet by mouth Every 12 (Twelve) Hours. 60 tablet 5    • ranolazine (RANEXA) 1000 MG 12 hr tablet EVERY TWELVE HOURS      • sertraline (ZOLOFT) 25 MG tablet Take 1 tablet by mouth Daily. 30 tablet 5    • traZODone (DESYREL) 300 MG tablet Take 1 tablet by mouth Every Night. 30 tablet 5    • cefuroxime (CEFTIN) 500 MG tablet Take 1 tablet by mouth 2 (Two) Times a Day. 20 tablet 0    • MethylPREDNISolone (MEDROL, ETELVINA,) 4 MG tablet Take as directed on package instructions.   Dispense 1 dose pack  No refills 1 each 0    • metoprolol succinate XL (TOPROL-XL) 100 MG 24 hr tablet Take 1 tablet by mouth Every Night. 30 tablet 5    • nitroglycerin (NITROSTAT) 0.4 MG SL tablet EVERY 5 MINUTES X 3 PRN as needed for CHEST PAIN      • oxymetazoline (AFRIN) 0.05 % nasal spray 2 sprays into each nostril 2 (Two) Times a Day. 20 mL 0 Unknown at Unknown time       amLODIPine 10 mg Oral Nightly   atorvastatin 20 mg Oral Daily   insulin aspart 0-9 Units Subcutaneous 4x Daily AC & at Bedtime   ipratropium-albuterol 1.5 mL Nebulization Q6H While Awake - RT   lisinopril 40 mg Oral QAM   metoprolol succinate  mg Oral Q24H   pramipexole 2 mg Oral Nightly   ranolazine 1,000 mg Oral Q12H   rivaroxaban 15 mg Oral BID With Meals   sertraline 25 mg Oral Daily   traZODone 300 mg Oral Nightly     Allergies:  Glipizide; Phenergan [promethazine hcl]; Risperdal [risperidone]; Tramadol; and Reglan [metoclopramide]    Objective      Vital Signs  Temp:  [96.3 °F (35.7 °C)-98.1 °F (36.7 °C)] 98.1 °F (36.7 °C)  Heart Rate:  [72-83] 77  Resp:  [18-24] 20  BP: (145-196)/(70-86) 145/70    Flowsheet Rows         First Filed Value    Admission Height  180.3 cm (71\") Documented at 12/12/2017 1607    Admission Weight  (!)  213 kg (469 lb 6.4 " "oz) Documented at 12/12/2017 1607        180.3 cm (71\")    Physical Exam   Constitutional: He is oriented to person, place, and time.   Morbid obese BMI 65.5   HENT:   Head: Normocephalic.   Mouth/Throat: Oropharynx is clear and moist.   Eyes: Pupils are equal, round, and reactive to light.   Neck: Neck supple. No JVD present.   Short    Cardiovascular: Normal rate, regular rhythm, normal heart sounds and intact distal pulses.    Pulses:       Carotid pulses are 1+ on the right side with bruit, and 1+ on the left side.       Radial pulses are 2+ on the right side, and 2+ on the left side.        Dorsalis pedis pulses are 2+ on the right side, and 2+ on the left side.        Posterior tibial pulses are 2+ on the right side, and 2+ on the left side.   RSR 62  Distant heart sounds    Pulmonary/Chest: Effort normal and breath sounds normal. No stridor.   L base rub    Abdominal: Soft. Bowel sounds are normal. There is no tenderness.   Obese    Musculoskeletal: He exhibits no edema or tenderness.   Neurological: He is alert and oriented to person, place, and time.   Skin: Skin is warm and dry.   Psychiatric: His behavior is normal.   Nursing note and vitals reviewed.      Results Review:   Lab Results (last 24 hours)     Procedure Component Value Units Date/Time    CBC & Differential [[id number]] Collected:  12/12/17 1625    Specimen:  Blood Updated:  12/12/17 1631    Narrative:       The following orders were created for panel order CBC & Differential.  Procedure                               Abnormality         Status                     ---------                               -----------         ------                     CBC Auto Differential[[id number]]        Abnormal            Final result                 Please view results for these tests on the individual orders.    CBC Auto Differential [[id number]]  (Abnormal) Collected:  12/12/17 1625    Specimen:  Blood Updated:  12/12/17 1631     WBC 7.92 10*3/mm3      RBC " 4.84 10*6/mm3      Hemoglobin 13.4 (L) g/dL      Hematocrit 39.6 %      MCV 81.8 fL      MCH 27.7 pg      MCHC 33.8 g/dL      RDW 14.3 %      RDW-SD 42.6 fl      MPV 9.3 fL      Platelets 160 10*3/mm3      Neutrophil % 72.2 %      Lymphocyte % 15.0 %      Monocyte % 8.1 %      Eosinophil % 3.3 %      Basophil % 0.1 %      Immature Grans % 1.3 (H) %      Neutrophils, Absolute 5.72 10*3/mm3      Lymphocytes, Absolute 1.19 10*3/mm3      Monocytes, Absolute 0.64 10*3/mm3      Eosinophils, Absolute 0.26 10*3/mm3      Basophils, Absolute 0.01 10*3/mm3      Immature Grans, Absolute 0.10 (H) 10*3/mm3     Comprehensive Metabolic Panel [[id number]]  (Abnormal) Collected:  12/12/17 1625    Specimen:  Blood Updated:  12/12/17 1642     Glucose 365 (H) mg/dL      BUN 9 mg/dL      Creatinine 0.80 mg/dL      Sodium 133 (L) mmol/L      Potassium 4.1 mmol/L      Chloride 93 (L) mmol/L      CO2 30.0 mmol/L      Calcium 9.0 mg/dL      Total Protein 7.7 g/dL      Albumin 4.00 g/dL      ALT (SGPT) 39 U/L      AST (SGOT) 41 U/L      Alkaline Phosphatase 79 U/L      Total Bilirubin 0.5 mg/dL      eGFR Non African Amer 108 mL/min/1.73      Globulin 3.7 (H) gm/dL      A/G Ratio 1.1 g/dL      BUN/Creatinine Ratio 11.3     Anion Gap 10.0 mmol/L     Protime-INR [[id number]]  (Normal) Collected:  12/12/17 1625    Specimen:  Blood Updated:  12/12/17 1649     Protime 12.3 Seconds      INR 0.92    Narrative:       Therapeutic range for most indications is 2.0-3.0 INR,  or 2.5-3.5 for mechanical heart valves.    D-dimer, Quantitative [[id number]]  (Normal) Collected:  12/12/17 1625    Specimen:  Blood Updated:  12/12/17 1652     D-Dimer, Quantitative <270 ng/mL (FEU)     Narrative:       Dimer values <500 ng/ml FEU are FDA approved as aid in diagnosis of deep venous thrombosis and pulmonary embolism.  This test should not be used in an exclusion strategy with pretest probability alone.    A recent guideline regarding diagnosis for pulmonary  thomboembolism recommends an adjusted exclusion criterion of age x 10 ng/ml FEU for patients >50 years of age ([employer]ori Intern Med 2015; 163: 701-711).    Troponin [[id number]]  (Normal) Collected:  12/12/17 1625    Specimen:  Blood Updated:  12/12/17 1654     Troponin I <0.012 ng/mL     BNP [[id number]]  (Normal) Collected:  12/12/17 1625    Specimen:  Blood Updated:  12/12/17 1654     proBNP 38.4 pg/mL     Light Blue Top [[id number]] Collected:  12/12/17 1625    Specimen:  Blood Updated:  12/12/17 1731     Extra Tube hold for add-on      Auto resulted       Lavender Top [[id number]] Collected:  12/12/17 1625    Specimen:  Blood Updated:  12/12/17 1731     Extra Tube hold for add-on      Auto resulted       [place] Draw [[id number]] Collected:  12/12/17 1625    Specimen:  Blood Updated:  12/12/17 1731    Narrative:       The following orders were created for panel order Melissa Draw.  Procedure                               Abnormality         Status                     ---------                               -----------         ------                     Light Blue Top[[id number]]                                   Final result               Green Top (Gel)[[id number]]                                  Final result               Lavender Top[[id number]]                                     Final result               Gold Top - SST[[id number]]                                   Final result                 Please view results for these tests on the individual orders.    Green Top (Gel) [[id number]] Collected:  12/12/17 1625    Specimen:  Blood Updated:  12/12/17 1731     Extra Tube Hold for add-ons.      Auto resulted.       Gold Top - SST [[id number]] Collected:  12/12/17 1625    Specimen:  Blood Updated:  12/12/17 1731     Extra Tube Hold for add-ons.      Auto resulted.       Light Blue Top [[id number]] Collected:  12/12/17 1628    Specimen:  Blood Updated:  12/12/17 1801     Extra Tube hold for add-on      Auto resulted       aPTT  [[id number]]  (Normal) Collected:  12/12/17 1628    Specimen:  Blood Updated:  12/12/17 1812     PTT 33.8 seconds     Narrative:       The recommended Heparin therapeutic range is 68-97 seconds.    POC Glucose Once [[id number]]  (Abnormal) Collected:  12/12/17 1822    Specimen:  Blood Updated:  12/12/17 1834     Glucose 312 (H) mg/dL       RN NotifiedMeter: [id number]: [id number] EHSAN CARDONA       POC Glucose Once [[id number]]  (Abnormal) Collected:  12/12/17 2012    Specimen:  Blood Updated:  12/12/17 2024     Glucose 205 (H) mg/dL       Meter: [id number]: [id number] PAUL ESPINOZA       POC Glucose Once [[id number]]  (Abnormal) Collected:  12/12/17 2142    Specimen:  Blood Updated:  12/12/17 2156     Glucose 257 (H) mg/dL       RN NotifiedMeter: [id number]: [id number] JUVE KRAFTT       Protime-INR [[id number]]  (Normal) Collected:  12/12/17 2232    Specimen:  Blood Updated:  12/12/17 2305     Protime 12.4 Seconds      INR 0.93    Narrative:       Therapeutic range for most indications is 2.0-3.0 INR,  or 2.5-3.5 for mechanical heart valves.    aPTT [[id number]]  (Normal) Collected:  12/12/17 2232    Specimen:  Blood Updated:  12/12/17 2305     PTT 30.7 seconds     Narrative:       The recommended Heparin therapeutic range is 68-97 seconds.    Extra Tubes [[id number]] Collected:  12/12/17 2231    Specimen:  Blood from Blood, Venous Line Updated:  12/12/17 2331    Narrative:       The following orders were created for panel order Extra Tubes.  Procedure                               Abnormality         Status                     ---------                               -----------         ------                     Lavender Top[[id number]]                                     Final result               Gold Top - [facility][[id number]]                                   Final result                 Please view results for these tests on the individual orders.    Lavender Top [[id number]] Collected:   12/12/17 2231    Specimen:  Blood Updated:  12/12/17 2331     Extra Tube hold for add-on      Auto resulted       Gold Top - SST [[id number]] Collected:  12/12/17 2231    Specimen:  Blood Updated:  12/12/17 2331     Extra Tube Hold for add-ons.      Auto resulted.       CBC (No Diff) [[id number]]  (Abnormal) Collected:  12/12/17 2231    Specimen:  Blood Updated:  12/13/17 0021     WBC 7.05 10*3/mm3      RBC 4.70 10*6/mm3      Hemoglobin 13.0 (L) g/dL      Hematocrit 38.4 (L) %      MCV 81.7 fL      MCH 27.7 pg      MCHC 33.9 g/dL      RDW 14.4 %      RDW-SD 42.8 fl      MPV 9.5 fL      Platelets 175 10*3/mm3     POC Glucose Once [[id number]]  (Abnormal) Collected:  12/13/17 0033    Specimen:  Blood Updated:  12/13/17 0135     Glucose 340 (H) mg/dL       RN NotifiedMeter: [id number]: [id number] JUVE LULI       [place] Draw [[id number]] Collected:  12/12/17 1628    Specimen:  Blood Updated:  12/13/17 0546    Narrative:       The following orders were created for panel order [place] Draw.  Procedure                               Abnormality         Status                     ---------                               -----------         ------                     Light Blue Top[[id number]]                                   Final result               Green Top (Gel)[[id number]]                                                             Lavender Top[[id number]]                                                                Gold Top - SST[[id number]]                                                                Please view results for these tests on the individual orders.    Extra Tubes [[id number]] Collected:  12/13/17 0530    Specimen:  Blood from Blood, Venous Line Updated:  12/13/17 0631    Narrative:       The following orders were created for panel order Extra Tubes.  Procedure                               Abnormality         Status                     ---------                               -----------          ------                     Lavender Top[[id number]]                                     Final result                 Please view results for these tests on the individual orders.    Lavender Top [[id number]] Collected:  12/13/17 0530    Specimen:  Blood Updated:  12/13/17 0631     Extra Tube hold for add-on      Auto resulted       aPTT [[id number]]  (Normal) Collected:  12/13/17 0538    Specimen:  Blood Updated:  12/13/17 0632     PTT 33.6 seconds     Narrative:       The recommended Heparin therapeutic range is 68-97 seconds.    Basic Metabolic Panel [[id number]]  (Abnormal) Collected:  12/13/17 0538    Specimen:  Blood Updated:  12/13/17 0657     Glucose 200 (H) mg/dL      BUN 8 mg/dL      Creatinine 0.76 mg/dL      Sodium 134 (L) mmol/L      Potassium 3.8 mmol/L      Chloride 95 mmol/L      CO2 29.0 mmol/L      Calcium 9.1 mg/dL      eGFR Non African Amer 114 mL/min/1.73      BUN/Creatinine Ratio 10.5     Anion Gap 10.0 mmol/L     POC Glucose Once [[id number]]  (Abnormal) Collected:  12/13/17 0629    Specimen:  Blood Updated:  12/13/17 0659     Glucose 194 (H) mg/dL       RN NotifiedMeter: [id number]: [id number] [facility]       POC Glucose Once [[id number]]  (Abnormal) Collected:  12/13/17 1009    Specimen:  Blood Updated:  12/13/17 1037     Glucose 295 (H) mg/dL       RN NotifiedMeter: [id number]: [id number] [facility]           Imaging Results (last 72 hours)     Procedure Component Value Units Date/Time    XR Chest 1 View [[id number]] Collected:  12/12/17 1629     Updated:  12/12/17 1647    Narrative:         EXAM:         Radiograph(s), Chest   VIEWS:   Portable ; 1       DATE/TIME: 12/12/2017 4:45 PM CST               INDICATION:     Chest Pain triage protocol    COMPARISON:   CXR: 11/14/17          FINDINGS:           - lines/tubes:     none     - cardiac:          size within normal limits         - mediastinum:  contour within normal limits         - lungs:          no focal air  space process, pulmonary  interstitial edema, nodule(s)/mass             - pleura:          no evidence of  fluid                  - osseous:          unremarkable for age                  - misc.:        Impression:       CONCLUSION:        1. No evidence of an acute cardiopulmonary process.                      Electronically signed by:  MIGUEL A Soni MD  12/12/2017 4:46  PM CST Workstation: [id number]    CT Angiogram Chest With Contrast [[id number]] Collected:  12/12/17 1815     Updated:  12/12/17 1851    Narrative:         EXAM:  Computed Tomography with CTA         REGION:  Chest       INDICATION:    dyspnea     - rule out pulmonary embolism       CLINICAL HISTORY:   Multiple prior episodes of pulmonary  embolism, currently on anticoagulation.     CORRELATIVE IMAGING:  CTA chest 11/15/17       (previously  interpreted as negative)  TECHNIQUE:     - PE / vascular protocol     - reconstructions:  axial, coronal, sagittal, obliques     - computer-generated 3D reconstructions (MIPS) were performed.     - contrast:  intravenous Isovue 370, 85 mL                                 This exam was performed according to the departmental  dose-optimization program which includes automated exposure  control, adjustment of the mA and/or kV according to patient size  and/or use of iterative reconstruction technique.         COMMENTS:    - Pulmonary arterial system:      - Main pulmonary artery trunk:  negative      - Left, right main pulmonary arteries: Positive nonocclusive  intraluminal filling defect on the right      - Lobar arteries: Multiple intraluminal filling defects  bilaterally, each nonocclusive.       - Segmental arteries: Difficult to assess      - Systemic vascularity (as visualized):        - Aorta:  grossly negative / normal caliber / no dissection        - roots of great vessels:  grossly negative / normal  caliber        - SVC / IVC:  grossly negative / normal caliber     - Misc (limited visualization):       - pulmonary parenchyma:  negative      - pleura:  negative      - mediastinal / glendy:  negative      - neck, inferior:  grossly wnl      - subdiaphragmatic structures:  grossly negative (limited  evaluation)       - osseous:      - misc:       .        Impression:       CONCLUSION:          1.  Bilateral pulmonary emboli. Note is made of the recent prior  examination dated 11/14/17, technically limited, grossly negative  for the presence of PE.    Clinical history provided by the referring physician at the time  of critical results notification is at the patient has had  multiple prior episodes of PE is directly, currently on Eloquest  and Lovenox.      It is suggested upon completion of therapy that a follow-up  examination be performed to assess for the presence of  chronic/organized thrombus.            2.  No evidence of pathology associated with the visualized  aorta.                                Critical results reporting:  ------------------------------  The results of examination have been discussed with Dr. Santosh Sparrow, with read back, immediately following interpretation of the  examination on 12/12/17 at 19:41 hours.                  Electronically signed by:  MIGUEL A Soni MD  12/12/2017 6:50  PM CST Workstation: [id number]      ECHO:  EF 60%  No valvular disease.  No RV strain       Assessment/Plan     Principal Problem:    Other pulmonary embolism without acute cor pulmonale  Active Problems:    Hyperlipidemia    Coronary arteriosclerosis in native artery    Morbid obesity    Chronic back pain    Type 2 diabetes mellitus    Essential hypertension    Factor II deficiency      Assessment & Plan    Recurrent Bilateral PE with hypercoagulable syndrome post medication change:  Efficacy of Eliquis with morbid obese is decreased.  Recommend return to Xarelto with acute dosing.  Stop heparin infusion 2 hours after first Xarelto dose.  Previously tolerated well with no reoccurrence of PE per CTA.    Xarelto dose pack at discharge.  PA to insurance with failure on Eliquis as inpatient as well as outpatient.  Will FU as outpatient in 2-3 weeks of discharge.   Lower extremity duplex is pending.    Thank you for the opportunity to participate in this patient's care.    Copy to primary care provider.        I discussed the patients findings and my recommendations with Dr Cassidy.     Letty Paige, APRN  12/13/17  10:47 AM

## 2017-12-13 NOTE — PLAN OF CARE
Problem: Patient Care Overview (Adult)  Goal: Plan of Care Review  Outcome: Ongoing (interventions implemented as appropriate)  Making Lifestyle Choices for a Healthier Weight used to provide ed regarding Wt Loss diet and diet copy given.    12/13/17 0993   Coping/Psychosocial Response Interventions   Plan Of Care Reviewed With patient   Patient Care Overview   Progress no change   Outcome Evaluation   Outcome Summary/Follow up Plan initial visit

## 2017-12-13 NOTE — ED NOTES
Tele pack applied to pt and verified with tech that it is tracing      Lala Garcia, GARY  12/12/17 2041

## 2017-12-13 NOTE — PLAN OF CARE
Problem: Patient Care Overview (Adult)  Goal: Plan of Care Review  Outcome: Ongoing (interventions implemented as appropriate)    12/13/17 0414   Coping/Psychosocial Response Interventions   Plan Of Care Reviewed With patient   Patient Care Overview   Progress no change         Problem: Respiratory Insufficiency (Adult)  Goal: Identify Related Risk Factors and Signs and Symptoms  Outcome: Ongoing (interventions implemented as appropriate)  Goal: Acid/Base Balance  Outcome: Ongoing (interventions implemented as appropriate)  Goal: Effective Ventilation  Outcome: Ongoing (interventions implemented as appropriate)

## 2017-12-13 NOTE — PROGRESS NOTES
[facility] Medicine Services  INPATIENT PROGRESS NOTE    Length of Stay: 0  Date of Admission: 12/12/2017  Primary Care Physician: LALA Barajas    Subjective     Chief Complaint   Patient presents with   • Shortness of Breath     HPI:  37 y.o male who presents with 1 week history of right sided pleuritic chest pain that is now B/L. Pt also has complaints of SOA and cough.     12/13/2017: Pt is doing better now, still has chest pain, and SOA. He is found to have B/L PE. He is started on heparin.     Review of Systems   HENT: Negative for postnasal drip.    Respiratory: Positive for chest tightness and shortness of breath.    Cardiovascular: Positive for chest pain. Negative for palpitations and leg swelling.   Gastrointestinal: Negative for abdominal pain, constipation, diarrhea and vomiting.   Genitourinary: Negative for dysuria and urgency.   Musculoskeletal: Negative for arthralgias, back pain and myalgias.   Neurological: Negative for dizziness and light-headedness.   Hematological: Negative for adenopathy.   Psychiatric/Behavioral: Negative for agitation and behavioral problems.        All pertinent negatives and positives are as above. All other systems have been reviewed and are negative unless otherwise stated.     Objective      Vital Sign Min/Max for last 24 hours  Temp  Min: 96.3 °F (35.7 °C)  Max: 98.1 °F (36.7 °C)   BP  Min: 145/70  Max: 196/81   Pulse  Min: 64  Max: 83   Resp  Min: 18  Max: 20   SpO2  Min: 92 %  Max: 97 %   Flow (L/min)  Min: 1.5  Max: 2   Weight  Min: 213 kg (469 lb 6.4 oz)  Max: 213 kg (469 lb 9.3 oz)         Physical Exam   Constitutional: He is oriented to person, place, and time. He appears well-developed and well-nourished.   HENT:   Head: Normocephalic.   Eyes: Pupils are equal, round, and reactive to light.   Neck: Normal range of motion.   Cardiovascular: Normal rate and regular rhythm.    Pulmonary/Chest: Effort normal and  breath sounds normal.   On 3L.    Musculoskeletal: Normal range of motion.   Neurological: He is alert and oriented to person, place, and time. He has normal reflexes.   Skin: Skin is warm.   Psychiatric: He has a normal mood and affect. His behavior is normal.   Vitals reviewed.      Current Facility-Administered Medications   Medication Dose Route Frequency Provider Last Rate Last Dose   • acetaminophen (TYLENOL) tablet 650 mg  650 mg Oral Q4H PRN Yared Mcnulty MD       • albuterol (PROVENTIL) nebulizer solution 0.083% 2.5 mg/3mL  1.25 mg Nebulization Q6H PRN Yared Mcnulyt MD       • amLODIPine (NORVASC) tablet 10 mg  10 mg Oral Nightly Yared Mcnulty MD   10 mg at 12/12/17 2250   • atorvastatin (LIPITOR) tablet 20 mg  20 mg Oral Daily Yared Mcnulty MD   20 mg at 12/13/17 0858   • dextrose (D50W) solution 25 g  25 g Intravenous Q15 Min PRN Yared Mcnulty MD       • dextrose (GLUTOSE) oral gel 15 g  15 g Oral Q15 Min PRN Yared Mcnulty MD       • glucagon (human recombinant) (GLUCAGEN DIAGNOSTIC) injection 1 mg  1 mg Subcutaneous Q15 Min PRN Yared Mcnulty MD       • heparin 28119 units/250 mL (100 units/mL) in 0.45 % NaCl infusion  7.04 Units/kg/hr Intravenous Titrated LALA Valencia 23.5 mL/hr at 12/13/17 1316 11.033 Units/kg/hr at 12/13/17 1316    And   • heparin (porcine) 5000 UNIT/ML injection 10,000 Units  46.9 Units/kg Intravenous PRN LALA Valencia        And   • heparin (porcine) 5000 UNIT/ML injection 8,500 Units  40 Units/kg Intravenous PRN LALA Valencia       • hydrALAZINE (APRESOLINE) injection 20 mg  20 mg Intravenous Q6H PRN Yared Mcnulty MD       • insulin aspart (novoLOG) injection 0-9 Units  0-9 Units Subcutaneous 4x Daily AC & at Bedtime Yared Mcnulty MD   6 Units at 12/13/17 1133   • ipratropium-albuterol (DUO-NEB) nebulizer solution 1.5 mL  1.5 mL Nebulization Q6H While Awake - RT Yared Mcnulty MD   1.5 mL at 12/13/17 7938   • lisinopril (PRINIVIL,ZESTRIL) tablet 40 mg  40 mg  Oral QAM Yared Mcnulty MD   40 mg at 12/13/17 0643   • metoprolol succinate XL (TOPROL-XL) 24 hr tablet 200 mg  200 mg Oral Q24H Yared Mcnulty MD   200 mg at 12/13/17 0858   • Morphine injection 1 mg  1 mg Intravenous Q3H PRN Gonsalo Hogue MD   1 mg at 12/13/17 1136    And   • naloxone (NARCAN) injection 0.4 mg  0.4 mg Intravenous Q5 Min PRN Gonsalo Hogue MD       • pramipexole (MIRAPEX) tablet 2 mg  2 mg Oral Nightly Yared Mcnulty MD   2 mg at 12/12/17 2249   • ranolazine (RANEXA) 12 hr tablet 1,000 mg  1,000 mg Oral Q12H Yared Mcnulty MD   1,000 mg at 12/13/17 0858   • rivaroxaban (XARELTO) tablet 15 mg  15 mg Oral BID With Meals LALA Valencia       • sertraline (ZOLOFT) tablet 25 mg  25 mg Oral Daily Yared Mcnulty MD   25 mg at 12/13/17 0858   • sodium chloride 0.9 % flush 1-10 mL  1-10 mL Intravenous PRN Yared Mcnulty MD       • sodium chloride 0.9 % flush 10 mL  10 mL Intravenous PRN Santosh Sparrow MD       • sodium chloride 0.9 % flush 10 mL  10 mL Intravenous PRN Santosh Sparrow MD       • sodium chloride 0.9 % flush 30 mL  30 mL Intravenous Once PRN Santosh Sparrow MD       • sodium chloride 0.9 % infusion  30 mL/hr Intravenous Continuous PRN Santosh Sparrow MD       • traZODone (DESYREL) tablet 300 mg  300 mg Oral Nightly Yared Mcnulty MD   300 mg at 12/12/17 2249           Results Review:  I have reviewed the labs, radiology results, and diagnostic studies.    Laboratory Data:     Results from last 7 days  Lab Units 12/13/17  0538 12/12/17  1625   SODIUM mmol/L 134* 133*   POTASSIUM mmol/L 3.8 4.1   CHLORIDE mmol/L 95 93*   CO2 mmol/L 29.0 30.0   BUN mg/dL 8 9   CREATININE mg/dL 0.76 0.80   GLUCOSE mg/dL 200* 365*   CALCIUM mg/dL 9.1 9.0   BILIRUBIN mg/dL  --  0.5   ALK PHOS U/L  --  79   ALT (SGPT) U/L  --  39   AST (SGOT) U/L  --  41   ANION GAP mmol/L 10.0 10.0     Estimated Creatinine Clearance: 239.9 mL/min (by C-G formula based on Cr of 0.76).            Results from last 7 days  Lab Units  12/12/17 2231 12/12/17  1625   WBC 10*3/mm3 7.05 7.92   HEMOGLOBIN g/dL 13.0* 13.4*   HEMATOCRIT % 38.4* 39.6   PLATELETS 10*3/mm3 175 160       Results from last 7 days  Lab Units 12/12/17 2232 12/12/17  1625   INR  0.93 0.92           Culture Data:   No results found for: BLOODCX  No results found for: URINECX  No results found for: RESPCX  No results found for: WOUNDCX  No results found for: STOOLCX  No components found for: BODYFLD      Radiology Data:   Imaging Results (last 24 hours)     Procedure Component Value Units Date/Time    XR Chest 1 View [[id number]] Collected:  12/12/17 1629     Updated:  12/12/17 1647    Narrative:         EXAM:         Radiograph(s), Chest   VIEWS:   Portable ; 1       DATE/TIME: 12/12/2017 4:45 PM CST               INDICATION:     Chest Pain triage protocol    COMPARISON:   CXR: 11/14/17          FINDINGS:           - lines/tubes:     none     - cardiac:          size within normal limits         - mediastinum:  contour within normal limits         - lungs:          no focal air space process, pulmonary  interstitial edema, nodule(s)/mass             - pleura:          no evidence of  fluid                  - osseous:          unremarkable for age                  - misc.:        Impression:       CONCLUSION:        1. No evidence of an acute cardiopulmonary process.                      Electronically signed by:  MIGUEL A Soni MD  12/12/2017 4:46  PM CST Workstation: [id number]    CT Angiogram Chest With Contrast [[id number]] Collected:  12/12/17 1815     Updated:  12/12/17 1851    Narrative:         EXAM:  Computed Tomography with CTA         REGION:  Chest       INDICATION:    dyspnea     - rule out pulmonary embolism       CLINICAL HISTORY:   Multiple prior episodes of pulmonary  embolism, currently on anticoagulation.     CORRELATIVE IMAGING:  CTA chest 11/15/17       (previously  interpreted as negative)  TECHNIQUE:     - PE / vascular protocol     - reconstructions:   axial, coronal, sagittal, obliques     - computer-generated 3D reconstructions (MIPS) were performed.     - contrast:  intravenous Isovue 370, 85 mL                                 This exam was performed according to the departmental  dose-optimization program which includes automated exposure  control, adjustment of the mA and/or kV according to patient size  and/or use of iterative reconstruction technique.         COMMENTS:    - Pulmonary arterial system:      - Main pulmonary artery trunk:  negative      - Left, right main pulmonary arteries: Positive nonocclusive  intraluminal filling defect on the right      - Lobar arteries: Multiple intraluminal filling defects  bilaterally, each nonocclusive.       - Segmental arteries: Difficult to assess      - Systemic vascularity (as visualized):        - Aorta:  grossly negative / normal caliber / no dissection        - roots of great vessels:  grossly negative / normal  caliber        - SVC / IVC:  grossly negative / normal caliber     - Misc (limited visualization):      - pulmonary parenchyma:  negative      - pleura:  negative      - mediastinal / glendy:  negative      - neck, inferior:  grossly wnl      - subdiaphragmatic structures:  grossly negative (limited  evaluation)       - osseous:      - misc:       .        Impression:       CONCLUSION:          1.  Bilateral pulmonary emboli. Note is made of the recent prior  examination dated 11/14/17, technically limited, grossly negative  for the presence of PE.    Clinical history provided by the referring physician at the time  of critical results notification is at the patient has had  multiple prior episodes of PE is directly, currently on Eloquest  and Lovenox.      It is suggested upon completion of therapy that a follow-up  examination be performed to assess for the presence of  chronic/organized thrombus.            2.  No evidence of pathology associated with the visualized  aorta.                                 Critical results reporting:  ------------------------------  The results of examination have been discussed with Dr. Santosh Sparrow, with read back, immediately following interpretation of the  examination on 12/12/17 at 19:41 hours.                  Electronically signed by:  MIGUEL A Soni MD  12/12/2017 6:50  PM CST Workstation: [id number]    US Venous Doppler Lower Extremity Bilateral (duplex) [[id number]] Updated:  12/13/17 1103          I have reviewed the patient current medications.     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     1. PE B/L: on heparin. Start xarelto tonight. CT surgery on board. He will further follow up with as out pt. US lower ext is pending. Echo show EF of 60%.   2. HTN:  Continue with lisinopril, metoprolol.   3. DM: on insulin. Well control, will continue with current therpay.   4. Depression and anxiety: zoloft and trazodone.       Discharge Planning: I expect patient to be discharged to home in 1-2 days.      DVT Prophylaxis: heparin/xarelto.               This document has been electronically signed by Gonsalo Hogue MD on December 13, 2017 3:09 PM

## 2017-12-13 NOTE — H&P
"    HISTORY AND PHYSICAL  NAME: Yordy Hansen  : 1980  MRN: [id number]    DATE OF ADMISSION: 17    DATE & TIME SEEN: 17 7:29 PM    PCP: LALA Barajas    CODE STATUS: Full Code    CHIEF COMPLAINT Chest pain    HPI:  Yordy Hansen is a 37 y.o. male who presents with a 1 week history of right sided pleuritic chest pain that is now bilateral. Patient also has complaints of SOA and cough.  Denies any diaphoresis, nausea, vomiting, near syncope, dizziness, or headaches.    Patient is morbidly obese with medical history significant for CAD, PE (on anticoagulation but recently switched), DM, GERD, and back pain.    The patient was diagnosed with right sided PE in April of this year and has been on Xarelto ever since until about a month ago when he was switched to Eliquis because his insurance is going to stop covering the Xarelto.  He has a history of chest pain with stenting.     CONCURRENT MEDICAL HISTORY:  Past Medical History:   Diagnosis Date   • Allergic rhinitis    • Asthma    • Chest pain    • CHF (congestive heart failure)    • Chronic back pain    • Coronary artery disease     2 stents.  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 CATH INVASIVE LOCATION;  Service:    • CARDIAC CATHETERIZATION N/A 3/15/2017    Procedure: Stent SOPHIA coronary;  Surgeon: Edwige Dillard MD;  Location: [facility] INVASIVE LOCATION;  Service:    • CHOLECYSTECTOMY     • CORONARY ANGIOPLASTY WITH STENT PLACEMENT     • WI " RT/LT HEART CATHETERS N/A 3/15/2017    Procedure: Percutaneous Coronary Intervention;  Surgeon: Edwige Briceno MD;  Location: [facility] INVASIVE LOCATION;  Service: Cardiovascular   • TONSILLECTOMY     • TRANSESOPHAGEAL ECHOCARDIOGRAM (MONICA)         FAMILY HISTORY:  Family History   Problem Relation Age of Onset   • Cancer Other    • Coronary artery disease Other    • Diabetes Other    • Heart disease Other    • Hypertension Other         SOCIAL HISTORY:  Social History     Social History   • Marital status:      Spouse name: Cori   • Number of children: 0   • Years of education: N/A     Occupational History   • Disabled      Social History Main Topics   • Smoking status: Former Smoker     Quit date: 1997   • Smokeless tobacco: Former User     Types: Snuff     Quit date: 2017   • Alcohol use No   • Drug use: No   • Sexual activity: Defer     Other Topics Concern   • Not on file     Social History Narrative       HOME MEDICATIONS:  Prior to Admission medications    Medication Sig Start Date End Date Taking? Authorizing Provider   albuterol (ACCUNEB) 1.25 MG/3ML nebulizer solution Take 3 mL by nebulization Every 6 (Six) Hours As Needed for Wheezing. 11/15/17  Yes LALA Wick   albuterol (PROVENTIL HFA) 108 (90 Base) MCG/ACT inhaler EVERY FOUR HOURS as needed for SOB/WHEEZING   Yes Historical Provider, MD   albuterol (PROVENTIL HFA;VENTOLIN HFA) 108 (90 BASE) MCG/ACT inhaler Inhale 2 puffs Every 4 (Four) Hours As Needed for Wheezing.   Yes Historical Provider, MD   amLODIPine (NORVASC) 10 MG tablet Take 1 tablet by mouth Every Night. 11/10/17  Yes Earnest Perez MD   apixaban (ELIQUIS) 5 MG tablet tablet Take 1 tablet by mouth 2 (Two) Times a Day. 11/10/17  Yes Earnest Perez MD   apixaban (ELIQUIS) 5 MG tablet tablet TWICE DAILY   Yes Historical Provider, MD   clopidogrel (PLAVIX) 75 MG tablet Take 1 tablet by mouth Daily. 11/10/17  Yes Earnest Perez MD   ipratropium-albuterol (DUO-NEB)  0.5-2.5 mg/mL nebulizer EVERY FOUR HOURS as needed for SHORTNESS OF BREATH   Yes Historical Provider, MD   lisinopril (PRINIVIL,ZESTRIL) 40 MG tablet Take 1 tablet by mouth Every Morning. 11/10/17  Yes Earnest Perez MD   metFORMIN (GLUCOPHAGE) 1000 MG tablet Take 1 tablet by mouth 2 (Two) Times a Day With Meals. 11/10/17  Yes Earnest Perez MD   metFORMIN (GLUCOPHAGE) 1000 MG tablet TWICE A DAY   Yes Historical Provider, MD   metFORMIN (GLUCOPHAGE) 1000 MG tablet Take 1,000 mg by mouth. 11/23/17  Yes Historical Provider, MD   metoprolol succinate XL (TOPROL-XL) 200 MG 24 hr tablet Take 200 mg by mouth. 11/22/17 12/23/17 Yes Historical Provider, MD   MICROLET LANCETS misc USE TO TEST BLOOD SUGAR EVERY DAY AND AS NEEDED 6/20/16  Yes Historical Provider, MD   nitroglycerin (NITROSTAT) 0.4 MG SL tablet Place 1 tablet under the tongue Every 5 (Five) Minutes As Needed for Chest Pain. Take no more than 3 doses in 15 minutes. 11/10/17  Yes Earnest Perez MD   pantoprazole (PROTONIX) 40 MG EC tablet Take 1 tablet by mouth Every Night. 11/10/17  Yes Earnest Perez MD   pramipexole (MIRAPEX) 1 MG tablet Take 2 tablets by mouth Every Night. Taking 2mg daily 11/10/17  Yes Earnest Perez MD   pravastatin (PRAVACHOL) 80 MG tablet Take 1 tablet by mouth Every Night. 11/10/17  Yes Earnest Perez MD   ranolazine (RANEXA) 1000 MG 12 hr tablet Take 1 tablet by mouth Every 12 (Twelve) Hours. 11/10/17  Yes Earnest Perez MD   ranolazine (RANEXA) 1000 MG 12 hr tablet EVERY TWELVE HOURS   Yes Historical Provider, MD   sertraline (ZOLOFT) 25 MG tablet Take 1 tablet by mouth Daily. 11/10/17  Yes Earnest Perez MD   traZODone (DESYREL) 300 MG tablet Take 1 tablet by mouth Every Night. 11/10/17  Yes Eranest Perez MD   cefuroxime (CEFTIN) 500 MG tablet Take 1 tablet by mouth 2 (Two) Times a Day. 11/15/17   LALA Wick   MethylPREDNISolone (MEDROL, ETELVINA,) 4 MG tablet Take as directed on package  instructions.   Dispense 1 dose pack  No refills 11/15/17   TahminaLALA Ma   metoprolol succinate XL (TOPROL-XL) 100 MG 24 hr tablet Take 1 tablet by mouth Every Night. 11/10/17   Earnest Perez MD   nitroglycerin (NITROSTAT) 0.4 MG SL tablet EVERY 5 MINUTES X 3 PRN as needed for CHEST PAIN    Historical Provider, MD   oxymetazoline (AFRIN) 0.05 % nasal spray 2 sprays into each nostril 2 (Two) Times a Day. 9/18/17   Alphonso Johnson MD       ALLERGIES:  Glipizide; Phenergan [promethazine hcl]; Risperdal [risperidone]; Tramadol; and Reglan [metoclopramide]    REVIEW OF SYSTEMS  Review of Systems   Constitutional: Positive for fatigue. Negative for activity change, appetite change and fever.   HENT: Negative for ear pain and sore throat.    Eyes: Negative for pain and visual disturbance.   Respiratory: Positive for cough and shortness of breath.    Cardiovascular: Negative for chest pain and palpitations.   Gastrointestinal: Negative for abdominal pain and nausea.   Endocrine: Negative for cold intolerance and heat intolerance.   Genitourinary: Negative for difficulty urinating and dysuria.   Musculoskeletal: Positive for arthralgias and back pain. Negative for gait problem.   Skin: Negative for color change and rash.   Neurological: Negative for dizziness, weakness and headaches.   Hematological: Negative for adenopathy. Does not bruise/bleed easily.   Psychiatric/Behavioral: Negative for agitation, confusion and sleep disturbance.       PHYSICAL EXAM:  Temp:  [97.7 °F (36.5 °C)-98 °F (36.7 °C)] 97.7 °F (36.5 °C)  Heart Rate:  [72-79] 73  Resp:  [20-24] 20  BP: (156-196)/(73-81) 183/73  Body mass index is 65.47 kg/(m^2).     Physical Exam   Constitutional: He is oriented to person, place, and time. He appears well-developed and well-nourished. No distress.   Morbidly obese   HENT:   Head: Normocephalic and atraumatic.   Right Ear: External ear normal.   Left Ear: External ear normal.   Nose: Nose  normal.   Eyes: Conjunctivae are normal. Pupils are equal, round, and reactive to light.   Neck: Normal range of motion. Neck supple.   Cardiovascular: Normal rate, regular rhythm, normal heart sounds and intact distal pulses.  Exam reveals no gallop and no friction rub.    No murmur heard.  Pulmonary/Chest: Effort normal. No respiratory distress. He has no wheezes. He has no rales. He exhibits no tenderness.   Clear breath sounds on auscultation, but somewhat diminished.   Abdominal: Soft. Bowel sounds are normal. He exhibits no distension and no mass. There is no tenderness. There is no rebound and no guarding.   Musculoskeletal: Normal range of motion.   Neurological: He is alert and oriented to person, place, and time.   Skin: Skin is warm and dry. No rash noted. He is not diaphoretic. No erythema. No pallor.   Psychiatric: He has a normal mood and affect. His behavior is normal.   Nursing note and vitals reviewed.      DIAGNOSTIC DATA:   Lab Results (last 24 hours)     Procedure Component Value Units Date/Time    CBC & Differential [[id number]] Collected:  12/12/17 1625    Specimen:  Blood Updated:  12/12/17 1631    Narrative:       The following orders were created for panel order CBC & Differential.  Procedure                               Abnormality         Status                     ---------                               -----------         ------                     CBC Auto Differential[[id number]]        Abnormal            Final result                 Please view results for these tests on the individual orders.    CBC Auto Differential [[id number]]  (Abnormal) Collected:  12/12/17 1625    Specimen:  Blood Updated:  12/12/17 1631     WBC 7.92 10*3/mm3      RBC 4.84 10*6/mm3      Hemoglobin 13.4 (L) g/dL      Hematocrit 39.6 %      MCV 81.8 fL      MCH 27.7 pg      MCHC 33.8 g/dL      RDW 14.3 %      RDW-SD 42.6 fl      MPV 9.3 fL      Platelets 160 10*3/mm3      Neutrophil % 72.2 %      Lymphocyte % 15.0  %      Monocyte % 8.1 %      Eosinophil % 3.3 %      Basophil % 0.1 %      Immature Grans % 1.3 (H) %      Neutrophils, Absolute 5.72 10*3/mm3      Lymphocytes, Absolute 1.19 10*3/mm3      Monocytes, Absolute 0.64 10*3/mm3      Eosinophils, Absolute 0.26 10*3/mm3      Basophils, Absolute 0.01 10*3/mm3      Immature Grans, Absolute 0.10 (H) 10*3/mm3     Comprehensive Metabolic Panel [[id number]]  (Abnormal) Collected:  12/12/17 1625    Specimen:  Blood Updated:  12/12/17 1642     Glucose 365 (H) mg/dL      BUN 9 mg/dL      Creatinine 0.80 mg/dL      Sodium 133 (L) mmol/L      Potassium 4.1 mmol/L      Chloride 93 (L) mmol/L      CO2 30.0 mmol/L      Calcium 9.0 mg/dL      Total Protein 7.7 g/dL      Albumin 4.00 g/dL      ALT (SGPT) 39 U/L      AST (SGOT) 41 U/L      Alkaline Phosphatase 79 U/L      Total Bilirubin 0.5 mg/dL      eGFR Non African Amer 108 mL/min/1.73      Globulin 3.7 (H) gm/dL      A/G Ratio 1.1 g/dL      BUN/Creatinine Ratio 11.3     Anion Gap 10.0 mmol/L     Protime-INR [[id number]]  (Normal) Collected:  12/12/17 1625    Specimen:  Blood Updated:  12/12/17 1649     Protime 12.3 Seconds      INR 0.92    Narrative:       Therapeutic range for most indications is 2.0-3.0 INR,  or 2.5-3.5 for mechanical heart valves.    D-dimer, Quantitative [[id number]]  (Normal) Collected:  12/12/17 1625    Specimen:  Blood Updated:  12/12/17 1652     D-Dimer, Quantitative <270 ng/mL (FEU)     Narrative:       Dimer values <500 ng/ml FEU are FDA approved as aid in diagnosis of deep venous thrombosis and pulmonary embolism.  This test should not be used in an exclusion strategy with pretest probability alone.    A recent guideline regarding diagnosis for pulmonary thomboembolism recommends an adjusted exclusion criterion of age x 10 ng/ml FEU for patients >50 years of age ([employer]ori Intern Med 2015; 163: 701-711).    Troponin [[id number]]  (Normal) Collected:  12/12/17 1625    Specimen:  Blood Updated:  12/12/17 3046      Troponin I <0.012 ng/mL     BNP [[id number]]  (Normal) Collected:  12/12/17 1625    Specimen:  Blood Updated:  12/12/17 1654     proBNP 38.4 pg/mL     Light Blue Top [[id number]] Collected:  12/12/17 1625    Specimen:  Blood Updated:  12/12/17 1731     Extra Tube hold for add-on      Auto resulted       Lavender Top [[id number]] Collected:  12/12/17 1625    Specimen:  Blood Updated:  12/12/17 1731     Extra Tube hold for add-on      Auto resulted       [place] Draw [[id number]] Collected:  12/12/17 1625    Specimen:  Blood Updated:  12/12/17 1731    Narrative:       The following orders were created for panel order [place] Draw.  Procedure                               Abnormality         Status                     ---------                               -----------         ------                     Light Blue Top[[id number]]                                   Final result               Green Top (Gel)[[id number]]                                  Final result               Lavender Top[[id number]]                                     Final result               Gold Top - SST[[id number]]                                   Final result                 Please view results for these tests on the individual orders.    Green Top (Gel) [[id number]] Collected:  12/12/17 1625    Specimen:  Blood Updated:  12/12/17 1731     Extra Tube Hold for add-ons.      Auto resulted.       Gold Top - SST [[id number]] Collected:  12/12/17 1625    Specimen:  Blood Updated:  12/12/17 1731     Extra Tube Hold for add-ons.      Auto resulted.       [place] Draw [[id number]] Collected:  12/12/17 1628    Specimen:  Blood Updated:  12/12/17 1801    Narrative:       The following orders were created for panel order [place] Draw.  Procedure                               Abnormality         Status                     ---------                               -----------         ------                     Light Blue Top[[id number]]                                    Final result               Green Top (Gel)[[id number]]                                                             Lavender Top[[id number]]                                                                Gold Top - SST[[id number]]                                                                Please view results for these tests on the individual orders.    Light Blue Top [[id number]] Collected:  12/12/17 1628    Specimen:  Blood Updated:  12/12/17 1801     Extra Tube hold for add-on      Auto resulted       aPTT [[id number]]  (Normal) Collected:  12/12/17 1628    Specimen:  Blood Updated:  12/12/17 1812     PTT 33.8 seconds     Narrative:       The recommended Heparin therapeutic range is 68-97 seconds.    POC Glucose Once [[id number]]  (Abnormal) Collected:  12/12/17 1822    Specimen:  Blood Updated:  12/12/17 1834     Glucose 312 (H) mg/dL       RN NotifiedMeter: [id number]: [id number] EHSAN CARDONA              Imaging Results (last 24 hours)     Procedure Component Value Units Date/Time    XR Chest 1 View [[id number]] Collected:  12/12/17 1629     Updated:  12/12/17 1647    Narrative:         EXAM:         Radiograph(s), Chest   VIEWS:   Portable ; 1       DATE/TIME: 12/12/2017 4:45 PM CST               INDICATION:     Chest Pain triage protocol    COMPARISON:   CXR: 11/14/17          FINDINGS:           - lines/tubes:     none     - cardiac:          size within normal limits         - mediastinum:  contour within normal limits         - lungs:          no focal air space process, pulmonary  interstitial edema, nodule(s)/mass             - pleura:          no evidence of  fluid                  - osseous:          unremarkable for age                  - misc.:        Impression:       CONCLUSION:        1. No evidence of an acute cardiopulmonary process.                      Electronically signed by:  MIGUEL A Soni MD  12/12/2017 4:46  PM CST Workstation: [id number]    CT Angiogram Chest With Contrast  [[id number]] Collected:  12/12/17 1815     Updated:  12/12/17 1851    Narrative:         EXAM:  Computed Tomography with CTA         REGION:  Chest       INDICATION:    dyspnea     - rule out pulmonary embolism       CLINICAL HISTORY:   Multiple prior episodes of pulmonary  embolism, currently on anticoagulation.     CORRELATIVE IMAGING:  CTA chest 11/15/17       (previously  interpreted as negative)  TECHNIQUE:     - PE / vascular protocol     - reconstructions:  axial, coronal, sagittal, obliques     - computer-generated 3D reconstructions (MIPS) were performed.     - contrast:  intravenous Isovue 370, 85 mL                                 This exam was performed according to the departmental  dose-optimization program which includes automated exposure  control, adjustment of the mA and/or kV according to patient size  and/or use of iterative reconstruction technique.         COMMENTS:    - Pulmonary arterial system:      - Main pulmonary artery trunk:  negative      - Left, right main pulmonary arteries: Positive nonocclusive  intraluminal filling defect on the right      - Lobar arteries: Multiple intraluminal filling defects  bilaterally, each nonocclusive.       - Segmental arteries: Difficult to assess      - Systemic vascularity (as visualized):        - Aorta:  grossly negative / normal caliber / no dissection        - roots of great vessels:  grossly negative / normal  caliber        - SVC / IVC:  grossly negative / normal caliber     - Misc (limited visualization):      - pulmonary parenchyma:  negative      - pleura:  negative      - mediastinal / glendy:  negative      - neck, inferior:  grossly wnl      - subdiaphragmatic structures:  grossly negative (limited  evaluation)       - osseous:      - misc:       .        Impression:       CONCLUSION:          1.  Bilateral pulmonary emboli. Note is made of the recent prior  examination dated 11/14/17, technically limited, grossly negative  for the presence of  PE.    Clinical history provided by the referring physician at the time  of critical results notification is at the patient has had  multiple prior episodes of PE is directly, currently on Eloquest  and Lovenox.      It is suggested upon completion of therapy that a follow-up  examination be performed to assess for the presence of  chronic/organized thrombus.            2.  No evidence of pathology associated with the visualized  aorta.                                Critical results reporting:  ------------------------------  The results of examination have been discussed with Dr. Santosh Sparrow, with read back, immediately following interpretation of the  examination on 12/12/17 at 19:41 hours.                  Electronically signed by:  MIGUEL A Soni MD  12/12/2017 6:50  PM [employer] Workstation: [id number]          I reviewed the patient's new clinical results.    ASSESSMENT AND PLAN: This is a 37 y.o. male with:    Active Hospital Problems (** Indicates Principal Problem)    Diagnosis Date Noted   • **Other pulmonary embolism without acute cor pulmonale [I26.99] 12/12/2017   • 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/30/2017   • Essential hypertension [I10] 05/23/2017   • Type 2 diabetes mellitus [E11.9] 04/24/2017   • Chronic back pain [M54.9, G89.29]    • Hyperlipidemia [E78.5] 12/13/2016   • Coronary arteriosclerosis in native artery [I25.10] 12/13/2016   • Morbid obesity [E66.01] 07/28/2016      Resolved Hospital Problems    Diagnosis Date Noted Date Resolved   No resolved problems to display.       1. PE. Patient with h/o PE that was treated with Xarelto but recently switched to Eliquis.  His PE was right sided in the past, but now has bilateral PE's.  CT surgery consulted, discussed case with Dr. Cassidy.  Plan to start on Heparin drip.  CT surgery to see in the am.  ECHO ordered.  2. DM. SSI, hold metformin due to dye.  3. CAD. Ranexa. Plavix.  4. HTN. Norvasc. Lisinopril. Toprol.  5. HLD. Statin.  6. Morbid Obesity.  Dietary.  Body mass index is 65.47 kg/(m^2).      Will monitor patient's hospital course and adjust treatment as hospital course dictates.    DVT prophylaxis: Heparin  Code status is Full Code     I discussed the patients findings and my recommendations with patient, nursing staff and consulting provider.           This document has been electronically signed by Yared Mcnulty MD on December 12, 2017 7:29 PM

## 2017-12-13 NOTE — CONSULTS
"Adult Nutrition  Assessment    Patient Name:  Yordy Hansen  YOB: 1980  MRN: [id number]  Admit Date:  12/12/2017    Assessment Date:  12/13/2017    Comments:  BMI 65 with pt at 269% IBW.  Making Lifestyle Choices for a Healthier Weight used to provide ed regarding Wt Loss diet and diet copy given.          Reason for Assessment       12/13/17 1333    Reason for Assessment    Reason For Assessment/Visit education;per organizational policy   Wt Loss diet    Identified At Risk By Screening Criteria need for education;BMI                  Anthropometrics       12/13/17 1334    Anthropometrics    Height 180.3 cm (70.98\")    Weight (!)  213 kg (469 lb 9.3 oz)    Ideal Body Weight (IBW)    Ideal Body Weight (IBW), Male (kg) 79.23    % Ideal Body Weight 269.4    Body Mass Index (BMI)    BMI (kg/m2) 65.66    BMI Grade greater than 40 - obesity grade III                Estimated/Assessed Needs       12/13/17 1334    Calculation Measurements    Weight Used For Calculations 112 kg (246 lb 11.1 oz)    Height Used for Calculations 1.803 m (5' 10.98\")    Estimated/Assessed Energy Needs    Energy Need Method [place]-St Jeor    Age 37    RMR ([place]-St. Jeor Equation) 2055.87    Activity Factors ([place] St Jeor)  Out of bed, ambulatory  1.3    Total estimated needs ([place] St. Jeor) 2672    Estimated/Assessed Protein Needs    Weight Used for Protein Calculation 112 kg (246 lb 11.1 oz)    Protein (gm/kg) 1.2    1.2 Gm Protein (gm) 134.28    Estimated Protein Range 111 - 134    Estimated/Assessed Fluid Needs    Fluid Need Method --   3357                Electronically signed by:  Ananya Oglesby RD  12/13/17 1:39 PM  "

## 2017-12-13 NOTE — ED PROVIDER NOTES
"Subjective   History of Present Illness    Review of Systems    Past Medical History:   Diagnosis Date   • Allergic rhinitis    • Asthma    • Chest pain    • CHF (congestive heart failure)    • Chronic back pain    • Coronary artery disease     2 stents.  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\"   • GERD (gastroesophageal reflux disease)    • Hyperlipidemia    • Hypertension    • Low back pain    • Morbid obesity    • Pulmonary embolism    • RLS (restless legs syndrome)    • Seizures    • Sleep apnea        Allergies   Allergen Reactions   • Glipizide Other (See Comments)     Hallucinations     • Phenergan [Promethazine Hcl] Other (See Comments)     Burning veins   • Risperdal [Risperidone] Other (See Comments)     Slurred speech   • Tramadol Other (See Comments)     seizures   • Reglan [Metoclopramide] Anxiety       Past Surgical History:   Procedure Laterality Date   • ADENOIDECTOMY     • CARDIAC CATHETERIZATION N/A 3/15/2017    Procedure: Left Heart Cath;  Surgeon: Edwige Dillard MD;  Location: [facility] INVASIVE LOCATION;  Service:    • CARDIAC CATHETERIZATION N/A 3/15/2017    Procedure: Stent SOPHIA coronary;  Surgeon: Edwige Dillard MD;  Location: [facility] CATH INVASIVE LOCATION;  Service:    • CHOLECYSTECTOMY     • CORONARY ANGIOPLASTY WITH STENT PLACEMENT     • MI RT/LT HEART CATHETERS N/A 3/15/2017    Procedure: Percutaneous Coronary Intervention;  Surgeon: Edwige Dillard MD;  Location: [facility] CATH INVASIVE LOCATION;  Service: Cardiovascular   • TONSILLECTOMY     • TRANSESOPHAGEAL ECHOCARDIOGRAM (MONICA)         Family History   Problem Relation Age of Onset   • Cancer Other    • Coronary artery disease Other    • Diabetes Other    • Heart disease Other    • Hypertension Other        Social History     Social History   • Marital status:      Spouse name: Cori   • Number of children: 0   • Years of education: N/A     Occupational History   • " Disabled      Social History Main Topics   • Smoking status: Former Smoker     Quit date: 1997   • Smokeless tobacco: Former User     Types: Snuff     Quit date: 2017   • Alcohol use No   • Drug use: No   • Sexual activity: Defer     Other Topics Concern   • None     Social History Narrative           Objective   Physical Exam    Procedures         ED Course  ED Course                  MDM    Final diagnoses:   Other pulmonary embolism without acute cor pulmonale, unspecified chronicity            Yenifer Justice MD  12/25/17 0801

## 2017-12-14 VITALS
SYSTOLIC BLOOD PRESSURE: 125 MMHG | RESPIRATION RATE: 20 BRPM | HEART RATE: 69 BPM | BODY MASS INDEX: 44.1 KG/M2 | DIASTOLIC BLOOD PRESSURE: 84 MMHG | HEIGHT: 71 IN | OXYGEN SATURATION: 96 % | WEIGHT: 315 LBS | TEMPERATURE: 97.1 F

## 2017-12-14 LAB
ANION GAP SERPL CALCULATED.3IONS-SCNC: 13 MMOL/L (ref 5–15)
BUN BLD-MCNC: 13 MG/DL (ref 7–21)
BUN/CREAT SERPL: 16.3 (ref 7–25)
CALCIUM SPEC-SCNC: 9 MG/DL (ref 8.4–10.2)
CHLORIDE SERPL-SCNC: 98 MMOL/L (ref 95–110)
CO2 SERPL-SCNC: 27 MMOL/L (ref 22–31)
CREAT BLD-MCNC: 0.8 MG/DL (ref 0.7–1.3)
DEPRECATED RDW RBC AUTO: 44.1 FL (ref 35.1–43.9)
ERYTHROCYTE [DISTWIDTH] IN BLOOD BY AUTOMATED COUNT: 14.7 % (ref 11.5–14.5)
GFR SERPL CREATININE-BSD FRML MDRD: 108 ML/MIN/1.73 (ref 70–162)
GLUCOSE BLD-MCNC: 352 MG/DL (ref 60–100)
GLUCOSE BLDC GLUCOMTR-MCNC: 247 MG/DL (ref 70–130)
GLUCOSE BLDC GLUCOMTR-MCNC: 337 MG/DL (ref 70–130)
HCT VFR BLD AUTO: 39 % (ref 39–49)
HGB BLD-MCNC: 13.1 G/DL (ref 13.7–17.3)
MCH RBC QN AUTO: 27.6 PG (ref 26.5–34)
MCHC RBC AUTO-ENTMCNC: 33.6 G/DL (ref 31.5–36.3)
MCV RBC AUTO: 82.3 FL (ref 80–98)
PLATELET # BLD AUTO: 161 10*3/MM3 (ref 150–450)
PMV BLD AUTO: 9.4 FL (ref 8–12)
POTASSIUM BLD-SCNC: 4 MMOL/L (ref 3.5–5.1)
RBC # BLD AUTO: 4.74 10*6/MM3 (ref 4.37–5.74)
SODIUM BLD-SCNC: 138 MMOL/L (ref 137–145)
WBC NRBC COR # BLD: 7.09 10*3/MM3 (ref 3.2–9.8)

## 2017-12-14 PROCEDURE — 63710000001 INSULIN ASPART PER 5 UNITS: Performed by: FAMILY MEDICINE

## 2017-12-14 PROCEDURE — 80048 BASIC METABOLIC PNL TOTAL CA: CPT | Performed by: FAMILY MEDICINE

## 2017-12-14 PROCEDURE — 25010000002 MORPHINE PER 10 MG: Performed by: FAMILY MEDICINE

## 2017-12-14 PROCEDURE — 85027 COMPLETE CBC AUTOMATED: CPT | Performed by: FAMILY MEDICINE

## 2017-12-14 PROCEDURE — 82962 GLUCOSE BLOOD TEST: CPT

## 2017-12-14 PROCEDURE — 99231 SBSQ HOSP IP/OBS SF/LOW 25: CPT | Performed by: NURSE PRACTITIONER

## 2017-12-14 PROCEDURE — 94799 UNLISTED PULMONARY SVC/PX: CPT

## 2017-12-14 RX ADMIN — SERTRALINE HYDROCHLORIDE 25 MG: 25 TABLET ORAL at 09:51

## 2017-12-14 RX ADMIN — ATORVASTATIN CALCIUM 20 MG: 20 TABLET, FILM COATED ORAL at 09:49

## 2017-12-14 RX ADMIN — INSULIN ASPART 8 UNITS: 100 INJECTION, SOLUTION INTRAVENOUS; SUBCUTANEOUS at 06:55

## 2017-12-14 RX ADMIN — METOPROLOL SUCCINATE 200 MG: 100 TABLET, EXTENDED RELEASE ORAL at 09:51

## 2017-12-14 RX ADMIN — IPRATROPIUM BROMIDE AND ALBUTEROL SULFATE 1.5 ML: .5; 3 SOLUTION RESPIRATORY (INHALATION) at 06:30

## 2017-12-14 RX ADMIN — INSULIN ASPART 4 UNITS: 100 INJECTION, SOLUTION INTRAVENOUS; SUBCUTANEOUS at 10:56

## 2017-12-14 RX ADMIN — Medication 1 MG: at 06:55

## 2017-12-14 RX ADMIN — LISINOPRIL 40 MG: 40 TABLET ORAL at 07:58

## 2017-12-14 RX ADMIN — RANOLAZINE 1000 MG: 500 TABLET, FILM COATED, EXTENDED RELEASE ORAL at 09:48

## 2017-12-14 RX ADMIN — RIVAROXABAN 15 MG: 15 TABLET, FILM COATED ORAL at 07:58

## 2017-12-14 NOTE — PLAN OF CARE
Problem: Patient Care Overview (Adult)  Goal: Plan of Care Review  Outcome: Ongoing (interventions implemented as appropriate)    12/14/17 0400   Coping/Psychosocial Response Interventions   Plan Of Care Reviewed With patient   Patient Care Overview   Progress improving         Problem: Respiratory Insufficiency (Adult)  Goal: Identify Related Risk Factors and Signs and Symptoms  Outcome: Ongoing (interventions implemented as appropriate)    Problem: VTE, DVT and PE (Adult)  Goal: Signs and Symptoms of Listed Potential Problems Will be Absent or Manageable (VTE, DVT and PE)  Outcome: Ongoing (interventions implemented as appropriate)    Problem: Pain, Acute (Adult)  Goal: Identify Related Risk Factors and Signs and Symptoms  Outcome: Ongoing (interventions implemented as appropriate)

## 2017-12-14 NOTE — DISCHARGE SUMMARY
[address] [address]  T - [phone]     DISCHARGE SUMMARY         PATIENT  DEMOGRAPHICS   PATIENT NAME: Yordy Hansen                      PHYSICIAN: Gonsalo Hogue MD  : 1980  MRN: [id number]    ADMISSION/DISCHARGE INFO   ADMISSION DATE: 2017   DISCHARGE DATE: 2017    ADMISSION DIAGNOSES: Other pulmonary embolism without acute cor pulmonale, unspecified chronicity [I26.99]  Other pulmonary embolism without acute cor pulmonale, unspecified chronicity [I26.99]  DISCHARGE DIAGNOSES:     Principal Problem:    Other pulmonary embolism without acute cor pulmonale  Active Problems:    Hyperlipidemia    Coronary arteriosclerosis in native artery    Morbid obesity    Chronic back pain    Type 2 diabetes mellitus    Essential hypertension    Factor II deficiency      SERVICE:  Medicine       CONSULTS   Consult Orders (all)     Start     Ordered    17  Inpatient Consult to Cardiothoracic Surgery  Once     Specialty:  Cardiothoracic Surgery  Provider:  Alirio Cassidy MD    17 2223          PROCEDURES     Imaging Results (last 24 hours)     Procedure Component Value Units Date/Time    US Venous Doppler Lower Extremity Bilateral (duplex) [[id number]] Collected:  17 1032     Updated:  17 1715    Narrative:       Doppler duplex venous examination bilateral lower extremities.       CLINICAL INDICATION: Pulmonary emboli.          COMPARISON: CTA thorax 2017.    FINDINGS:    The common femoral,  femoral, and popliteal veins of the  bilateral     lower extremity are well identified and compress  normally.  Doppler signals are heard either spontaneously or on  distal compression.  No evidence of intraluminal thrombus was  noted.     The visualized posterior calf veins are normal.      Impression:       CONCLUSION:  No evidence of deep venous thrombosis in the common  femoral, superficial femoral veins, or popliteal  veins of the   bilateral lower extremities.       Electronically signed by:  Al Rodriguez MD  12/13/2017 5:14 PM CST  Workstation: [id number]          Xr Chest 1 View    Result Date: 12/12/2017  Narrative: EXAM:         Radiograph(s), Chest VIEWS:   Portable ; 1     DATE/TIME: 12/12/2017 4:45 PM CST           INDICATION:     Chest Pain triage protocol  COMPARISON:   CXR: 11/14/17      FINDINGS:       - lines/tubes:     none   - cardiac:          size within normal limits       - mediastinum:  contour within normal limits       - lungs:          no focal air space process, pulmonary interstitial edema, nodule(s)/mass           - pleura:          no evidence of  fluid                - osseous:          unremarkable for age                - misc.:       Impression: CONCLUSION:    1. No evidence of an acute cardiopulmonary process.            Electronically signed by:  MIGUEL A Soni MD  12/12/2017 4:46 PM CST Workstation: [id number]    Xr Chest 1 View    Result Date: 11/14/2017  Narrative: [facility] Patient Name: KEARA ESCOBAR ATTENDING: JAYLON JHA REFERRING: JAYLON JHA ORDERING: JAYLON JHA ----------------------- PROCEDURE: Chest, AP portable Date of exam: 11/14/2017 at 2143 hours HISTORY: Chest pain A single portable view of the chest was obtained. No previous studies are available for comparison. The lungs are satisfactorily expanded and clear of infiltrates or effusions. The heart size is enlarged and the vasculature does not appear congested.The costophrenic angles are clear.     Impression: No active disease. Electronically signed by:  Juliocesar Pedraza MD  11/14/2017 10:28 PM CST Workstation: [id number]    Us Venous Doppler Lower Extremity Bilateral (duplex)    Result Date: 12/13/2017  Narrative: Doppler duplex venous examination bilateral lower extremities. CLINICAL INDICATION: Pulmonary emboli.    COMPARISON: CTA thorax December 12, 2017. FINDINGS: The common  femoral,  femoral, and popliteal veins of the bilateral     lower extremity are well identified and compress normally.  Doppler signals are heard either spontaneously or on distal compression.  No evidence of intraluminal thrombus was noted. The visualized posterior calf veins are normal.     Impression: CONCLUSION:  No evidence of deep venous thrombosis in the common femoral, superficial femoral veins, or popliteal veins of the bilateral lower extremities.   Electronically signed by:  Al Rodriguez MD  12/13/2017 5:14 PM CST Workstation: [id number]    Ct Angiogram Chest With Contrast    Result Date: 12/12/2017  Narrative: EXAM:  Computed Tomography with CTA       REGION:  Chest     INDICATION:    dyspnea   - rule out pulmonary embolism     CLINICAL HISTORY:   Multiple prior episodes of pulmonary embolism, currently on anticoagulation.   CORRELATIVE IMAGING:  CTA chest 11/15/17       (previously interpreted as negative) TECHNIQUE:    - PE / vascular protocol    - reconstructions:  axial, coronal, sagittal, obliques    - computer-generated 3D reconstructions (MIPS) were performed.    - contrast:  intravenous Isovue 370, 85 mL                             This exam was performed according to the departmental dose-optimization program which includes automated exposure control, adjustment of the mA and/or kV according to patient size and/or use of iterative reconstruction technique.     COMMENTS: - Pulmonary arterial system:     - Main pulmonary artery trunk:  negative     - Left, right main pulmonary arteries: Positive nonocclusive intraluminal filling defect on the right     - Lobar arteries: Multiple intraluminal filling defects bilaterally, each nonocclusive.     - Segmental arteries: Difficult to assess  - Systemic vascularity (as visualized):       - Aorta:  grossly negative / normal caliber / no dissection       - roots of great vessels:  grossly negative / normal caliber       - SVC / IVC:  grossly negative / normal  caliber  - Misc (limited visualization):     - pulmonary parenchyma:  negative     - pleura:  negative     - mediastinal / glendy:  negative     - neck, inferior:  grossly wnl     - subdiaphragmatic structures:  grossly negative (limited evaluation)     - osseous:     - misc:    .        Impression: CONCLUSION:      1.  Bilateral pulmonary emboli. Note is made of the recent prior examination dated 11/14/17, technically limited, grossly negative for the presence of PE. Clinical history provided by the referring physician at the time of critical results notification is at the patient has had multiple prior episodes of PE is directly, currently on Eloquest and Lovenox.  It is suggested upon completion of therapy that a follow-up examination be performed to assess for the presence of chronic/organized thrombus.         2.  No evidence of pathology associated with the visualized aorta.                          Critical results reporting: ------------------------------ The results of examination have been discussed with Dr. Santosh Sparrow, with read back, immediately following interpretation of the examination on 12/12/17 at 19:41 hours.             Electronically signed by:  MIGUEL A Soni MD  12/12/2017 6:50 PM CST Workstation: [id number]    Ct Angiogram Chest With Contrast    Result Date: 11/15/2017  Narrative: Exam: CT angiogram chest with contrast. INDICATION: Chest pain TECHNIQUE: Routine CT angiogram chest with contrast Computer generated 3-D reconstruction/MIPS were obtained. The evaluation of the pulmonary arteries is suboptimal due to miss timing of the contrast bolus an patient's body habitus.  CT technique performed using ALARA. FINDINGS: No mediastinal hilar lymphadenopathy. Thoracic aorta is normal. No visible thrombus in pulmonary arterial tree.. Limited evaluation distal segmental pulmonary artery branches. Heart size normal. Lungs are clear. No pneumothorax or pleural effusion. Status post cholecystectomy.  Old healed left six rib fracture.     Impression: Limited exam. No obvious pulmonary embolus. Electronically signed by:  Brennan Corrigan MD  11/15/2017 1:38 AM CST Workstation: [id number]      HISTORY OF PRESENT ILLNESS   Yordy Hansen is a 37 y.o. male who presents with 1 week history of right pleuritic chest pain that is now B/L. Patient also has complaints of SOA and cough. Denies any diapheresis, N/V, or near syncope.     DIAGNOSTIC DATA   Labs   Images     HOSPITAL COURSE   Pt was found to have B/L pulmonary emboli. Pt has history of PE in the past, he was taking xarelto doing well, however due to insurance coverage, he was switched to eliquis. Pt developed PE, therefore he was admitted to hospital. Pt was started on heparin, then switch to xarelto. CT surgery was consulted recommended to continue with the treatment. Plan for pt to have out pt follow up with CT surgery. He ambulated in the hallway without any signs of hypoxia. Declined home health.     Physical Exam   Constitutional: He is oriented to person, place, and time. He appears well-developed and well-nourished.   HENT:   Head: Normocephalic.   Eyes: EOM are normal. Pupils are equal, round, and reactive to light.   Neck: Normal range of motion.   Cardiovascular: Normal rate, regular rhythm and normal heart sounds.    Pulmonary/Chest: Effort normal and breath sounds normal.   Abdominal: Soft. Bowel sounds are normal.   Musculoskeletal: Normal range of motion.   Neurological: He is alert and oriented to person, place, and time.   Skin: Skin is warm.   Psychiatric: He has a normal mood and affect. His behavior is normal.   Vitals reviewed.       DISCHARGE CONDITION   Stable    DISPOSITION   To Home      DISCHARGE MEDICATIONS      Yordy Hansen   Home Medication Instructions ROCKY:[id number]    Printed on:12/14/17 4690   Medication Information                      albuterol (ACCUNEB) 1.25 MG/3ML nebulizer solution  Take 3 mL by nebulization  Every 6 (Six) Hours As Needed for Wheezing.             albuterol (PROVENTIL HFA) 108 (90 Base) MCG/ACT inhaler  EVERY FOUR HOURS as needed for SOB/WHEEZING             albuterol (PROVENTIL HFA;VENTOLIN HFA) 108 (90 BASE) MCG/ACT inhaler  Inhale 2 puffs Every 4 (Four) Hours As Needed for Wheezing.             amLODIPine (NORVASC) 10 MG tablet  Take 1 tablet by mouth Every Night.             clopidogrel (PLAVIX) 75 MG tablet  Take 1 tablet by mouth Daily.             ipratropium-albuterol (DUO-NEB) 0.5-2.5 mg/mL nebulizer  EVERY FOUR HOURS as needed for SHORTNESS OF BREATH             isosorbide mononitrate (IMDUR) 120 MG 24 hr tablet  Take 120 mg by mouth Daily.             lisinopril (PRINIVIL,ZESTRIL) 40 MG tablet  Take 1 tablet by mouth Every Morning.             metFORMIN (GLUCOPHAGE) 1000 MG tablet  TWICE A DAY             metoprolol succinate XL (TOPROL-XL) 200 MG 24 hr tablet  Take 200 mg by mouth.             MICROLET LANCETS misc  USE TO TEST BLOOD SUGAR EVERY DAY AND AS NEEDED             nitroglycerin (NITROSTAT) 0.4 MG SL tablet  Place 1 tablet under the tongue Every 5 (Five) Minutes As Needed for Chest Pain. Take no more than 3 doses in 15 minutes.             oxymetazoline (AFRIN) 0.05 % nasal spray  2 sprays into each nostril 2 (Two) Times a Day.             pantoprazole (PROTONIX) 40 MG EC tablet  Take 1 tablet by mouth Every Night.             pramipexole (MIRAPEX) 1 MG tablet  Take 2 tablets by mouth Every Night. Taking 2mg daily             pravastatin (PRAVACHOL) 80 MG tablet  Take 1 tablet by mouth Every Night.             ranolazine (RANEXA) 1000 MG 12 hr tablet  Take 1 tablet by mouth Every 12 (Twelve) Hours.             rivaroxaban (XARELTO) 15 MG tablet  Take 1 tablet by mouth 2 (Two) Times a Day With Meals.             sertraline (ZOLOFT) 25 MG tablet  Take 1 tablet by mouth Daily.             traZODone (DESYREL) 300 MG tablet  Take 1 tablet by mouth Every Night.                    INSTRUCTIONS   Activity:   Activity Instructions     Discharge Activity                     Diet:   Diet Instructions     Diet: Cardiac, Consistent Carbohydrate       Discharge Diet:   Cardiac  Consistent Carbohydrate                    Special Instructions: Patient instructed to call M.D. or return to ED with worsening shortness of breath, chest pain, fever greater than 100.4°F or any other medical concerns.    FOLLOW UP   Follow-up Information     Follow up with LALA Barajas. Go in 4 day(s).    Specialty:  Nurse Practitioner    Why:  Hospital Follow up. Monday December 18, 2017  at 10:00 am     Contact information:    [address] [address] [phone]          Follow up with LALA Case. Go on 1/16/2018.    Specialty:  Cardiothoracic Surgery    Why:  at 11:20 am     Contact information:    [address] [place] [address] [address] [place] [address] [phone]           PENDING TEST RESULTS AT DISCHARGE      TIME   Time: 33 minutes were spent planning this discharge.                      This document has been electronically signed by Gonsalo Hogue MD on December 14, 2017 1:48 PM

## 2017-12-14 NOTE — PROGRESS NOTES
Cardiothoracic - Vascular Surgery Daily Note        LOS: 1 day   Bilateral  PE       Chief Complaint:  Chest pain worse with breathing with SOA      Subjective       VAS 5    ROS:    Review of Systems   Constitution: Negative for chills, decreased appetite, fever, weakness and malaise/fatigue.   HENT: Negative for hoarse voice, nosebleeds and stridor.    Eyes: Negative for visual disturbance.   Cardiovascular: Positive for chest pain (pleuritic ) and dyspnea on exertion. Negative for claudication, cyanosis, irregular heartbeat and leg swelling.   Respiratory: Positive for cough. Negative for hemoptysis and shortness of breath.    Hematologic/Lymphatic: Negative for bleeding problem. Does not bruise/bleed easily.   Skin: Negative for color change, dry skin and rash.   Musculoskeletal: Negative for back pain, muscle cramps and muscle weakness.   Gastrointestinal: Negative for abdominal pain, heartburn, hematemesis and melena.   Genitourinary: Negative for hematuria.   Neurological: Negative for difficulty with concentration, numbness and paresthesias.   Psychiatric/Behavioral: Negative for altered mental status.   Allergic/Immunologic: Negative for hives.         Objective     Vital Signs  Temp:  [97.2 °F (36.2 °C)-98.2 °F (36.8 °C)] 97.5 °F (36.4 °C)  Heart Rate:  [61-79] 79  Resp:  [20] 20  BP: (113-153)/(53-70) 113/53  Body mass index is 65.92 kg/(m^2).    Intake/Output Summary (Last 24 hours) at 12/14/17 1109  Last data filed at 12/13/17 1801   Gross per 24 hour   Intake              700 ml   Output                0 ml   Net              700 ml          Wt Readings from Last 3 Encounters:   12/14/17 (!) 214 kg (472 lb 6.4 oz)   12/12/17 (!) 204 kg (450 lb)   11/29/17 (!) 206 kg (454 lb)         Physical Exam   Physical Exam   Constitutional: He is oriented to person, place, and time. He appears well-nourished.   obese   HENT:   Head: Normocephalic.   Mouth/Throat: Oropharynx is clear and moist.   Eyes:  Conjunctivae are normal. Pupils are equal, round, and reactive to light.   Neck: No JVD present. No tracheal deviation present.   Obese     Cardiovascular: Normal rate, regular rhythm, normal heart sounds and intact distal pulses.    Distant    Pulmonary/Chest: Effort normal and breath sounds normal.   Distant with rub    Musculoskeletal: He exhibits edema.   Neurological: He is alert and oriented to person, place, and time.   Skin: Skin is warm and dry.   Psychiatric: His behavior is normal. Judgment normal.   Nursing note and vitals reviewed.    Incisions:  Clean and dry.    Results Review:                                PT/INR:    Protime   Date Value Ref Range Status   12/12/2017 12.4 11.1 - 15.3 Seconds Final   12/12/2017 12.3 11.1 - 15.3 Seconds Final   /  INR   Date Value Ref Range Status   12/12/2017 0.93 0.80 - 1.20 Final   12/12/2017 0.92 0.80 - 1.20 Final               amLODIPine 10 mg Oral Nightly   atorvastatin 20 mg Oral Daily   insulin aspart 0-9 Units Subcutaneous 4x Daily AC & at Bedtime   ipratropium-albuterol 1.5 mL Nebulization Q6H While Awake - RT   lisinopril 40 mg Oral QAM   metoprolol succinate  mg Oral Q24H   pramipexole 2 mg Oral Nightly   ranolazine 1,000 mg Oral Q12H   rivaroxaban 15 mg Oral BID With Meals   sertraline 25 mg Oral Daily   traZODone 300 mg Oral Nightly       sodium chloride 30 mL/hr       Patient Active Problem List   Diagnosis Code   • Hyperlipidemia E78.5   • Coronary arteriosclerosis in native artery I25.10   • Morbid obesity E66.01   • Obstructive sleep apnea syndrome G47.33   • Chest pain R07.9   • Chronic back pain M54.9, G89.29   • GERD (gastroesophageal reflux disease) K21.9   • Hypertension I10   • RLS (restless legs syndrome) G25.81   • Seizures R56.9   • Pulmonary embolism I26.99   • Type 2 diabetes mellitus E11.9   • Coronary artery disease involving native heart I25.10   • Pulmonary embolism on long-term anticoagulation therapy I26.99, Z79.01   •  Essential hypertension I10   • Factor II deficiency D68.2   • Epigastric pain R10.13   • Otitis media H66.90   • Other pulmonary embolism without acute cor pulmonale I26.99         ASSESSMENT/PLAN     Pulmonary Embolus Bilateral:    Acute dosing xarelto. Tolerating well No bleeding or unexplained bruising.    Office FU 01/16/17 at 1120.    Complete PA for Xarelto, insurance changed from Xarelto to Eliquis.  Has failed Eliquis and Coumadin.

## 2017-12-18 ENCOUNTER — OFFICE VISIT (OUTPATIENT)
Dept: FAMILY MEDICINE CLINIC | Facility: CLINIC | Age: 37
End: 2017-12-18

## 2017-12-18 VITALS
HEIGHT: 71 IN | OXYGEN SATURATION: 94 % | HEART RATE: 76 BPM | WEIGHT: 315 LBS | SYSTOLIC BLOOD PRESSURE: 128 MMHG | DIASTOLIC BLOOD PRESSURE: 88 MMHG | TEMPERATURE: 97.9 F | BODY MASS INDEX: 44.1 KG/M2

## 2017-12-18 DIAGNOSIS — E66.01 MORBID OBESITY (HCC): Chronic | ICD-10-CM

## 2017-12-18 DIAGNOSIS — G25.81 RLS (RESTLESS LEGS SYNDROME): ICD-10-CM

## 2017-12-18 DIAGNOSIS — I26.99 PULMONARY EMBOLISM ON LONG-TERM ANTICOAGULATION THERAPY (HCC): ICD-10-CM

## 2017-12-18 DIAGNOSIS — I26.99 OTHER ACUTE PULMONARY EMBOLISM WITHOUT ACUTE COR PULMONALE (HCC): Primary | Chronic | ICD-10-CM

## 2017-12-18 DIAGNOSIS — Z79.01 PULMONARY EMBOLISM ON LONG-TERM ANTICOAGULATION THERAPY (HCC): ICD-10-CM

## 2017-12-18 DIAGNOSIS — E11.59 TYPE 2 DIABETES MELLITUS WITH OTHER CIRCULATORY COMPLICATION, UNSPECIFIED LONG TERM INSULIN USE STATUS: Chronic | ICD-10-CM

## 2017-12-18 DIAGNOSIS — I10 ESSENTIAL HYPERTENSION: ICD-10-CM

## 2017-12-18 PROCEDURE — 99213 OFFICE O/P EST LOW 20 MIN: CPT | Performed by: NURSE PRACTITIONER

## 2017-12-18 NOTE — PROGRESS NOTES
Chief Complaint   Patient presents with   • Pulmonary Embolism     hosp fu from 12/12/17       Subjective   Yordy Hansen is a 37 y.o. male presents to the office for re-evaluation of HTN, PE with lifelong anticoagulant and hospital follow up.    PMH  HTN- managed with amlodipine, metoprolol, lisinopril, imdur     Cardiology:   He is followed by Dr. Jenkins and Yolanda Bautista, APRN- cardiology. Previously  followed by Dr. Boone. He had a cardiac stent placed in July 2016 and another  placed in 03/2017.     Recent PE 12/2017- was previously on xarelto, insurance would not cover and was  switched to eliquis. Followed by coumadin clinic. Now back on lifelong xarelto- if  not covered, will be placed on coumadin    T2DM- managed with metformin. Patient has not been taking since hospital d/c. States  he was previously on invokanna, but is no longer taking and does not know why    chronic chest pain- prn nitro, renexa    factor 2 def.- lifelong anticoagulant, plavix, xarelto     Hyperlipidemia- managed with pravastatin    RLS- managed with mirapex    Insomnia- managed with trazedone    GERD- managed with protonix    He is also a coumadin/anticoagualtion clinic patient. He was recently hospitalized through [facility] on 05/09/2017 and 12/2017 for multiple PE. He is now prescribed lifelong xarelto:      HPI   Patient presents for hospital f/u. Hospital records reviewed. He denies acute complaints. He does c/o continued chest discomfort, exacerbated by breathing secondary to multiple PE. This is a long term problem. He has been using his inhalers with poor relief of inspiratory pain and is wanting to know if there are any other inhalers he can use.      No new complaints at this time. He states he was given a xarelto starter pack from coumadin clinic that should cover him until he f/u with the coumadin clinic.     He states he has not taken metformin since hospital d/c because his mother has kidney failure from  metformin per patient statement. He also does not know why he is no longer taking invokanna.     Has upcoming appointment with cardiothoracic sx 1/16/2018  Will see Dr. Jenkins 01/04/2018      HPI  Family History   Problem Relation Age of Onset   • Cancer Other    • Coronary artery disease Other    • Diabetes Other    • Heart disease Other    • Hypertension Other      Social History     Social History   • Marital status:      Spouse name: Cori   • Number of children: 0   • Years of education: N/A     Occupational History   • Disabled      Social History Main Topics   • Smoking status: Former Smoker     Quit date: 1997   • Smokeless tobacco: Former User     Types: Snuff     Quit date: 2017   • Alcohol use No   • Drug use: No   • Sexual activity: Defer     Other Topics Concern   • Not on file     Social History Narrative       The following portions of the patient's history were reviewed and updated as appropriate: allergies, current medications, past family history, past medical history, past social history, past surgical history and problem list.    Review of Systems   Constitutional: Positive for fatigue. Negative for activity change, appetite change, chills, fever and unexpected weight change.   HENT: Negative.  Negative for congestion, drooling, facial swelling, postnasal drip, rhinorrhea, sinus pressure, sore throat, trouble swallowing and voice change.    Eyes: Negative.  Negative for photophobia, pain, discharge and visual disturbance.   Respiratory: Negative for apnea, cough, choking and wheezing.    Cardiovascular: Positive for chest pain. Negative for palpitations and leg swelling.   Gastrointestinal: Negative.  Negative for abdominal distention, abdominal pain, constipation, diarrhea, nausea and vomiting.   Endocrine: Negative for polydipsia, polyphagia and polyuria.   Genitourinary: Negative.  Negative for decreased urine volume, difficulty urinating and dysuria.   Musculoskeletal: Negative.   "Negative for arthralgias, gait problem and myalgias.   Skin: Negative.  Negative for rash and wound.   Allergic/Immunologic: Negative.    Neurological: Negative.  Negative for dizziness, syncope, weakness, light-headedness, numbness and headaches.   Hematological: Negative.    Psychiatric/Behavioral: Negative.  Negative for confusion, decreased concentration and sleep disturbance. The patient is not nervous/anxious.      14 Point ROS completed with pertinent positives discussed. All other systems reviewed and are negative.       Objective   Encounter Vitals  /88  Pulse 76  Temp 97.9 °F (36.6 °C) (Tympanic)   Ht 180.3 cm (70.98\")  Wt (!) 210 kg (462 lb)  SpO2 94%  BMI 64.47 kg/m2    Physical Exam   Constitutional: He is oriented to person, place, and time. Vital signs are normal. He appears well-developed and well-nourished.  Non-toxic appearance.   Morbid obesity   HENT:   Head: Normocephalic and atraumatic.   Right Ear: Tympanic membrane, external ear and ear canal normal.   Left Ear: Tympanic membrane, external ear and ear canal normal.   Nose: Nose normal.   Mouth/Throat: Uvula is midline, oropharynx is clear and moist and mucous membranes are normal. No posterior oropharyngeal edema or posterior oropharyngeal erythema.   Eyes: Lids are normal. Pupils are equal, round, and reactive to light.   Neck: Trachea normal and normal range of motion. Neck supple. No thyroid mass present.   Cardiovascular: Normal rate, regular rhythm, S1 normal, S2 normal, normal heart sounds and intact distal pulses.  PMI is displaced.  Exam reveals no gallop and no friction rub.    No murmur heard.  Pulmonary/Chest: Effort normal and breath sounds normal. No respiratory distress. He has no wheezes. He has no rhonchi. He has no rales.   Abdominal: Soft. Normal appearance and bowel sounds are normal. He exhibits no mass. There is no rebound and no guarding.   Musculoskeletal: Normal range of motion.   Lymphadenopathy:     He " has no cervical adenopathy.   Neurological: He is alert and oriented to person, place, and time. He has normal strength. No cranial nerve deficit or sensory deficit. Gait normal.   Skin: Skin is warm and dry. Rash noted.        Psychiatric: He has a normal mood and affect. His speech is normal and behavior is normal. Judgment and thought content normal. Cognition and memory are normal.   Nursing note and vitals reviewed.      Key Imaging/Tracings/POC Testing    Assessment and Medications  Problems Addressed this Visit        Cardiovascular and Mediastinum    Pulmonary embolism - Primary (Chronic)    Essential hypertension       Digestive    Morbid obesity (Chronic)       Endocrine    Type 2 diabetes mellitus (Chronic)       Other    RLS (restless legs syndrome)    Pulmonary embolism on long-term anticoagulation therapy            Plan/Additional Notes/Instructions  Plan     1. No change in medications today  2. Patient denies needing refills at this time  3. Will research to find out why he is no longer on invokanna and vicotza  4. labwork tbc after he sees cardiothoracic sx in Jan 2018  5. F/u after completion of labs- labs are already in place from last appt.    Follow-Up  Return in about 4 weeks (around 1/15/2018) for With full labs.    Patient/caregiver verbalizes understanding of all orders and instructions in this plan of care.     Pertinent Labs  Admission on 12/12/2017, Discharged on 12/14/2017   Component Date Value Ref Range Status   • Troponin I 12/12/2017 <0.012  <=0.034 ng/mL Final   • Glucose 12/12/2017 365* 60 - 100 mg/dL Final   • BUN 12/12/2017 9  7 - 21 mg/dL Final   • Creatinine 12/12/2017 0.80  0.70 - 1.30 mg/dL Final   • Sodium 12/12/2017 133* 137 - 145 mmol/L Final   • Potassium 12/12/2017 4.1  3.5 - 5.1 mmol/L Final   • Chloride 12/12/2017 93* 95 - 110 mmol/L Final   • CO2 12/12/2017 30.0  22.0 - 31.0 mmol/L Final   • Calcium 12/12/2017 9.0  8.4 - 10.2 mg/dL Final   • Total Protein 12/12/2017  7.7  6.3 - 8.6 g/dL Final   • Albumin 12/12/2017 4.00  3.40 - 4.80 g/dL Final   • ALT (SGPT) 12/12/2017 39  21 - 72 U/L Final   • AST (SGOT) 12/12/2017 41  17 - 59 U/L Final   • Alkaline Phosphatase 12/12/2017 79  38 - 126 U/L Final   • Total Bilirubin 12/12/2017 0.5  0.2 - 1.3 mg/dL Final   • eGFR Non African Amer 12/12/2017 108  70 - 162 mL/min/1.73 Final   • Globulin 12/12/2017 3.7* 2.3 - 3.5 gm/dL Final   • A/G Ratio 12/12/2017 1.1  1.1 - 1.8 g/dL Final   • BUN/Creatinine Ratio 12/12/2017 11.3  7.0 - 25.0 Final   • Anion Gap 12/12/2017 10.0  5.0 - 15.0 mmol/L Final   • proBNP 12/12/2017 38.4  0.0 - 450.0 pg/mL Final   • D-Dimer, Quantitative 12/12/2017 <270  0 - 470 ng/mL (FEU) Final   • Protime 12/12/2017 12.3  11.1 - 15.3 Seconds Final   • INR 12/12/2017 0.92  0.80 - 1.20 Final   • Extra Tube 12/12/2017 hold for add-on   Final    Auto resulted   • Extra Tube 12/12/2017 Hold for add-ons.   Final    Auto resulted.   • Extra Tube 12/12/2017 hold for add-on   Final    Auto resulted   • Extra Tube 12/12/2017 Hold for add-ons.   Final    Auto resulted.   • WBC 12/12/2017 7.92  3.20 - 9.80 10*3/mm3 Final   • RBC 12/12/2017 4.84  4.37 - 5.74 10*6/mm3 Final   • Hemoglobin 12/12/2017 13.4* 13.7 - 17.3 g/dL Final   • Hematocrit 12/12/2017 39.6  39.0 - 49.0 % Final   • MCV 12/12/2017 81.8  80.0 - 98.0 fL Final   • MCH 12/12/2017 27.7  26.5 - 34.0 pg Final   • MCHC 12/12/2017 33.8  31.5 - 36.3 g/dL Final   • RDW 12/12/2017 14.3  11.5 - 14.5 % Final   • RDW-SD 12/12/2017 42.6  35.1 - 43.9 fl Final   • MPV 12/12/2017 9.3  8.0 - 12.0 fL Final   • Platelets 12/12/2017 160  150 - 450 10*3/mm3 Final   • Neutrophil % 12/12/2017 72.2  37.0 - 80.0 % Final   • Lymphocyte % 12/12/2017 15.0  10.0 - 50.0 % Final   • Monocyte % 12/12/2017 8.1  0.0 - 12.0 % Final   • Eosinophil % 12/12/2017 3.3  0.0 - 7.0 % Final   • Basophil % 12/12/2017 0.1  0.0 - 2.0 % Final   • Immature Grans % 12/12/2017 1.3* 0.0 - 0.5 % Final   • Neutrophils,  Absolute 12/12/2017 5.72  2.00 - 8.60 10*3/mm3 Final   • Lymphocytes, Absolute 12/12/2017 1.19  0.60 - 4.20 10*3/mm3 Final   • Monocytes, Absolute 12/12/2017 0.64  0.00 - 0.90 10*3/mm3 Final   • Eosinophils, Absolute 12/12/2017 0.26  0.00 - 0.70 10*3/mm3 Final   • Basophils, Absolute 12/12/2017 0.01  0.00 - 0.20 10*3/mm3 Final   • Immature Grans, Absolute 12/12/2017 0.10* 0.00 - 0.02 10*3/mm3 Final   • PTT 12/12/2017 33.8  20.0 - 40.3 seconds Final   • Extra Tube 12/12/2017 hold for add-on   Final    Auto resulted   • Glucose 12/12/2017 312* 70 - 130 mg/dL Final    RN NotifiedMeter: [id number]: [id number] EHSAN CARDONA   • Glucose 12/12/2017 205* 70 - 130 mg/dL Final    Meter: [id number]: [id number] PAUL ESPINOZA   • Glucose 12/12/2017 257* 70 - 130 mg/dL Final    RN NotifiedMeter: [id number]: [id number] JUVE ROCKWELL   • BSA 12/13/2017 3.0  m^2 Preliminary   • BH CV ECHO ADALID - RVDD 12/13/2017 2.8  cm Preliminary   • IVSd 12/13/2017 1.5  cm Preliminary   • LVIDd 12/13/2017 6.1  cm Preliminary   • LVIDs 12/13/2017 4.5  cm Preliminary   • LVPWd 12/13/2017 1.6  cm Preliminary   • IVS/LVPW 12/13/2017 0.97   Preliminary   • FS 12/13/2017 26.2  % Preliminary   • EDV(Teich) 12/13/2017 183.9  ml Preliminary   • ESV(Teich) 12/13/2017 90.9  ml Preliminary   • EF(Teich) 12/13/2017 50.6  % Preliminary   • EDV(cubed) 12/13/2017 222.2  ml Preliminary   • ESV(cubed) 12/13/2017 89.2  ml Preliminary   • EF(cubed) 12/13/2017 59.9  % Preliminary   • LV mass(C)d 12/13/2017 453.4  grams Preliminary   • LV mass(C)dI 12/13/2017 149.7  grams/m^2 Preliminary   • SV(Teich) 12/13/2017 93.0  ml Preliminary   • SI(Teich) 12/13/2017 30.7  ml/m^2 Preliminary   • SV(cubed) 12/13/2017 133.0  ml Preliminary   • SI(cubed) 12/13/2017 43.9  ml/m^2 Preliminary   • Ao root diam 12/13/2017 2.5  cm Preliminary   • Ao root area 12/13/2017 4.9  cm^2 Preliminary   • ACS 12/13/2017 1.8  cm Preliminary   • LA dimension 12/13/2017  3.3  cm Preliminary   • asc Aorta Diam 12/13/2017 2.6  cm Preliminary   • Ao Isth Diam 12/13/2017 2.3  cm Preliminary   • LA/Ao 12/13/2017 1.3   Preliminary   • LVOT diam 12/13/2017 2.5  cm Preliminary   • LVOT area 12/13/2017 5.1  cm^2 Preliminary   • LVOT area(traced) 12/13/2017 4.9  cm^2 Preliminary   • Ao root area (BSA corrected) 12/13/2017 0.83   Preliminary   • MV E max silvia 12/13/2017 85.9  cm/sec Preliminary   • MV A max silvia 12/13/2017 77.1  cm/sec Preliminary   • MV E/A 12/13/2017 1.1   Preliminary   • MV V2 max 12/13/2017 106.8  cm/sec Preliminary   • MV max PG 12/13/2017 4.6  mmHg Preliminary   • MV V2 mean 12/13/2017 72.5  cm/sec Preliminary   • MV mean PG 12/13/2017 2.3  mmHg Preliminary   • MV V2 VTI 12/13/2017 27.8  cm Preliminary   • MVA(VTI) 12/13/2017 5.0  cm^2 Preliminary   • MV P1/2t max silvia 12/13/2017 108.0  cm/sec Preliminary   • MV P1/2t 12/13/2017 47.7  msec Preliminary   • MVA(P1/2t) 12/13/2017 4.6  cm^2 Preliminary   • MV dec slope 12/13/2017 662.5  cm/sec^2 Preliminary   • Ao pk silvia 12/13/2017 162.9  cm/sec Preliminary   • Ao max PG 12/13/2017 10.6  mmHg Preliminary   • Ao max PG (full) 12/13/2017 -0.86  mmHg Preliminary   • Ao V2 mean 12/13/2017 107.8  cm/sec Preliminary   • Ao mean PG 12/13/2017 5.5  mmHg Preliminary   • Ao mean PG (full) 12/13/2017 -0.72  mmHg Preliminary   • Ao V2 VTI 12/13/2017 25.4  cm Preliminary   • TANVIR(I,A) 12/13/2017 5.5  cm^2 Preliminary   • TANVIR(I,D) 12/13/2017 5.5  cm^2 Preliminary   • TANVIR(V,A) 12/13/2017 5.3  cm^2 Preliminary   • TANVIR(V,D) 12/13/2017 5.3  cm^2 Preliminary   • LV V1 max PG 12/13/2017 11.5  mmHg Preliminary   • LV V1 mean PG 12/13/2017 6.2  mmHg Preliminary   • LV V1 max 12/13/2017 169.4  cm/sec Preliminary   • LV V1 mean 12/13/2017 110.9  cm/sec Preliminary   • LV V1 VTI 12/13/2017 27.3  cm Preliminary   • SV(Ao) 12/13/2017 125.0  ml Preliminary   • SI(Ao) 12/13/2017 41.2  ml/m^2 Preliminary   • SV(LVOT) 12/13/2017 138.8  ml Preliminary   •  SI(LVOT) 12/13/2017 45.8  ml/m^2 Preliminary   • PA V2 max 12/13/2017 152.0  cm/sec Preliminary   • PA max PG 12/13/2017 9.2  mmHg Preliminary   • PI end-d silvia 12/13/2017 65.2  cm/sec Preliminary   • TR max silvia 12/13/2017 139.8  cm/sec Preliminary   • MVA P1/2T LCG 12/13/2017 2.0  cm^2 Preliminary   • BH CV ECHO ADALID - BZI_BMI 12/13/2017 65.4  kilograms/m^2 Preliminary   • BH CV ECHO ADALID - BSA(HAYCOCK) 12/13/2017 3.4  m^2 Preliminary   • BH CV ECHO ADALID - BZI_METRIC_WEIGHT 12/13/2017 212.7  kg Preliminary   • BH CV ECHO ADALID - BZI_METRIC_HEIGHT 12/13/2017 180.3  cm Preliminary   • Target HR (85%) 12/13/2017 154  bpm Final   • Max. Pred. HR (100%) 12/13/2017 181  bpm Final   • Echo EF Estimated 12/13/2017 60  % Final   • Protime 12/12/2017 12.4  11.1 - 15.3 Seconds Final   • INR 12/12/2017 0.93  0.80 - 1.20 Final   • PTT 12/12/2017 30.7  20.0 - 40.3 seconds Final   • Extra Tube 12/12/2017 hold for add-on   Final    Auto resulted   • Extra Tube 12/12/2017 Hold for add-ons.   Final    Auto resulted.   • Glucose 12/13/2017 200* 60 - 100 mg/dL Final   • BUN 12/13/2017 8  7 - 21 mg/dL Final   • Creatinine 12/13/2017 0.76  0.70 - 1.30 mg/dL Final   • Sodium 12/13/2017 134* 137 - 145 mmol/L Final   • Potassium 12/13/2017 3.8  3.5 - 5.1 mmol/L Final   • Chloride 12/13/2017 95  95 - 110 mmol/L Final   • CO2 12/13/2017 29.0  22.0 - 31.0 mmol/L Final   • Calcium 12/13/2017 9.1  8.4 - 10.2 mg/dL Final   • eGFR Non African Amer 12/13/2017 114  >60 mL/min/1.73 Final   • BUN/Creatinine Ratio 12/13/2017 10.5  7.0 - 25.0 Final   • Anion Gap 12/13/2017 10.0  5.0 - 15.0 mmol/L Final   • WBC 12/12/2017 7.05  3.20 - 9.80 10*3/mm3 Final   • RBC 12/12/2017 4.70  4.37 - 5.74 10*6/mm3 Final   • Hemoglobin 12/12/2017 13.0* 13.7 - 17.3 g/dL Final   • Hematocrit 12/12/2017 38.4* 39.0 - 49.0 % Final   • MCV 12/12/2017 81.7  80.0 - 98.0 fL Final   • MCH 12/12/2017 27.7  26.5 - 34.0 pg Final   • MCHC 12/12/2017 33.9  31.5 - 36.3 g/dL Final   •  RDW 12/12/2017 14.4  11.5 - 14.5 % Final   • RDW-SD 12/12/2017 42.8  35.1 - 43.9 fl Final   • MPV 12/12/2017 9.5  8.0 - 12.0 fL Final   • Platelets 12/12/2017 175  150 - 450 10*3/mm3 Final   • PTT 12/13/2017 33.6  20.0 - 40.3 seconds Final   • Glucose 12/13/2017 340* 70 - 130 mg/dL Final    RN NotifiedMeter: [id number]: [id number] JUVE ROCKWELL   • Extra Tube 12/13/2017 hold for add-on   Final    Auto resulted   • Glucose 12/13/2017 194* 70 - 130 mg/dL Final    RN NotifiedMeter: [id number]: [id number] JUVE ROCKWELL   • PTT 12/13/2017 37.4  20.0 - 40.3 seconds Final   • Glucose 12/13/2017 295* 70 - 130 mg/dL Final    RN NotifiedMeter: [id number]: [id number] TI CALVERT   • Glucose 12/13/2017 249* 70 - 130 mg/dL Final    RN NotifiedMeter: [id number]: [id number] TI CALVERT   • Glucose 12/13/2017 284* 70 - 130 mg/dL Final    RN NotifiedMeter: [id number]: [id number] LIVE PANCHAL   • Glucose 12/14/2017 352* 60 - 100 mg/dL Final   • BUN 12/14/2017 13  7 - 21 mg/dL Final   • Creatinine 12/14/2017 0.80  0.70 - 1.30 mg/dL Final   • Sodium 12/14/2017 138  137 - 145 mmol/L Final   • Potassium 12/14/2017 4.0  3.5 - 5.1 mmol/L Final   • Chloride 12/14/2017 98  95 - 110 mmol/L Final   • CO2 12/14/2017 27.0  22.0 - 31.0 mmol/L Final   • Calcium 12/14/2017 9.0  8.4 - 10.2 mg/dL Final   • eGFR Non African Amer 12/14/2017 108  70 - 162 mL/min/1.73 Final   • BUN/Creatinine Ratio 12/14/2017 16.3  7.0 - 25.0 Final   • Anion Gap 12/14/2017 13.0  5.0 - 15.0 mmol/L Final   • WBC 12/14/2017 7.09  3.20 - 9.80 10*3/mm3 Final   • RBC 12/14/2017 4.74  4.37 - 5.74 10*6/mm3 Final   • Hemoglobin 12/14/2017 13.1* 13.7 - 17.3 g/dL Final   • Hematocrit 12/14/2017 39.0  39.0 - 49.0 % Final   • MCV 12/14/2017 82.3  80.0 - 98.0 fL Final   • MCH 12/14/2017 27.6  26.5 - 34.0 pg Final   • MCHC 12/14/2017 33.6  31.5 - 36.3 g/dL Final   • RDW 12/14/2017 14.7* 11.5 - 14.5 % Final   • RDW-SD  12/14/2017 44.1* 35.1 - 43.9 fl Final   • MPV 12/14/2017 9.4  8.0 - 12.0 fL Final   • Platelets 12/14/2017 161  150 - 450 10*3/mm3 Final   • Glucose 12/14/2017 337* 70 - 130 mg/dL Final    RN NotifiedMeter: [id number]: [id number] LIVE PANCHAL   • Glucose 12/14/2017 247* 70 - 130 mg/dL Final    RN NotifiedMeter: [id number]: [id number] JAZMINE APRIL   Admission on 11/14/2017, Discharged on 11/15/2017   Component Date Value Ref Range Status   • Troponin I 11/14/2017 <0.012  <=0.034 ng/mL Final   • Troponin I 11/15/2017 <0.012  <=0.034 ng/mL Final   • Glucose 11/14/2017 170* 60 - 100 mg/dL Final   • BUN 11/14/2017 10  7 - 21 mg/dL Final   • Creatinine 11/14/2017 0.72  0.70 - 1.30 mg/dL Final   • Sodium 11/14/2017 135* 137 - 145 mmol/L Final   • Potassium 11/14/2017 3.9  3.5 - 5.1 mmol/L Final   • Chloride 11/14/2017 96  95 - 110 mmol/L Final   • CO2 11/14/2017 30.0  22.0 - 31.0 mmol/L Final   • Calcium 11/14/2017 9.5  8.4 - 10.2 mg/dL Final   • Total Protein 11/14/2017 8.0  6.3 - 8.6 g/dL Final   • Albumin 11/14/2017 4.20  3.40 - 4.80 g/dL Final   • ALT (SGPT) 11/14/2017 31  21 - 72 U/L Final   • AST (SGOT) 11/14/2017 31  17 - 59 U/L Final   • Alkaline Phosphatase 11/14/2017 77  38 - 126 U/L Final   • Total Bilirubin 11/14/2017 0.7  0.2 - 1.3 mg/dL Final   • eGFR Non African Amer 11/14/2017 122  >60 mL/min/1.73 Final   • Globulin 11/14/2017 3.8* 2.3 - 3.5 gm/dL Final   • A/G Ratio 11/14/2017 1.1  1.1 - 1.8 g/dL Final   • BUN/Creatinine Ratio 11/14/2017 13.9  7.0 - 25.0 Final   • Anion Gap 11/14/2017 9.0  5.0 - 15.0 mmol/L Final   • proBNP 11/14/2017 21.2  0.0 - 450.0 pg/mL Final   • Extra Tube 11/14/2017 hold for add-on   Final    Auto resulted   • Extra Tube 11/14/2017 Hold for add-ons.   Final    Auto resulted.   • Extra Tube 11/14/2017 hold for add-on   Final    Auto resulted   • Extra Tube 11/14/2017 Hold for add-ons.   Final    Auto resulted.   • WBC 11/14/2017 9.64  3.20 - 9.80 10*3/mm3  Final   • RBC 11/14/2017 5.33  4.37 - 5.74 10*6/mm3 Final   • Hemoglobin 11/14/2017 14.8  13.7 - 17.3 g/dL Final   • Hematocrit 11/14/2017 42.9  39.0 - 49.0 % Final   • MCV 11/14/2017 80.5  80.0 - 98.0 fL Final   • MCH 11/14/2017 27.8  26.5 - 34.0 pg Final   • MCHC 11/14/2017 34.5  31.5 - 36.3 g/dL Final   • RDW 11/14/2017 14.1  11.5 - 14.5 % Final   • RDW-SD 11/14/2017 41.0  35.1 - 43.9 fl Final   • MPV 11/14/2017 9.0  8.0 - 12.0 fL Final   • Platelets 11/14/2017 183  150 - 450 10*3/mm3 Final   • Neutrophil % 11/14/2017 67.7  37.0 - 80.0 % Final   • Lymphocyte % 11/14/2017 20.0  10.0 - 50.0 % Final   • Monocyte % 11/14/2017 8.3  0.0 - 12.0 % Final   • Eosinophil % 11/14/2017 2.3  0.0 - 7.0 % Final   • Basophil % 11/14/2017 0.2  0.0 - 2.0 % Final   • Immature Grans % 11/14/2017 1.5* 0.0 - 0.5 % Final   • Neutrophils, Absolute 11/14/2017 6.53  2.00 - 8.60 10*3/mm3 Final   • Lymphocytes, Absolute 11/14/2017 1.93  0.60 - 4.20 10*3/mm3 Final   • Monocytes, Absolute 11/14/2017 0.80  0.00 - 0.90 10*3/mm3 Final   • Eosinophils, Absolute 11/14/2017 0.22  0.00 - 0.70 10*3/mm3 Final   • Basophils, Absolute 11/14/2017 0.02  0.00 - 0.20 10*3/mm3 Final   • Immature Grans, Absolute 11/14/2017 0.14* 0.00 - 0.02 10*3/mm3 Final   • Troponin I 11/15/2017 <0.012  <=0.034 ng/mL Final   • D-Dimer, Quantitative 11/15/2017 <270  0 - 470 ng/mL (FEU) Final   Admission on 10/05/2017, Discharged on 10/10/2017   No results displayed because visit has over 200 results.      Admission on 09/21/2017, Discharged on 09/22/2017   Component Date Value Ref Range Status   • Troponin I 09/21/2017 <0.012  <=0.034 ng/mL Final   • Troponin I 09/21/2017 <0.012  <=0.034 ng/mL Final   • Glucose 09/21/2017 279* 60 - 100 mg/dL Final   • BUN 09/21/2017 15  7 - 21 mg/dL Final   • Creatinine 09/21/2017 0.78  0.70 - 1.30 mg/dL Final   • Sodium 09/21/2017 134* 137 - 145 mmol/L Final   • Potassium 09/21/2017 3.9  3.5 - 5.1 mmol/L Final   • Chloride 09/21/2017 96  95  - 110 mmol/L Final   • CO2 09/21/2017 30.0  22.0 - 31.0 mmol/L Final   • Calcium 09/21/2017 8.5  8.4 - 10.2 mg/dL Final   • Total Protein 09/21/2017 6.1* 6.3 - 8.6 g/dL Final   • Albumin 09/21/2017 3.40  3.40 - 4.80 g/dL Final   • ALT (SGPT) 09/21/2017 59  21 - 72 U/L Final   • AST (SGOT) 09/21/2017 60* 17 - 59 U/L Final   • Alkaline Phosphatase 09/21/2017 58  38 - 126 U/L Final   • Total Bilirubin 09/21/2017 1.1  0.2 - 1.3 mg/dL Final   • eGFR Non African Amer 09/21/2017 111  70 - 162 mL/min/1.73 Final   • Globulin 09/21/2017 2.7  2.3 - 3.5 gm/dL Final   • A/G Ratio 09/21/2017 1.3  1.1 - 1.8 g/dL Final   • BUN/Creatinine Ratio 09/21/2017 19.2  7.0 - 25.0 Final   • Anion Gap 09/21/2017 8.0  5.0 - 15.0 mmol/L Final   • proBNP 09/21/2017 39.4  0.0 - 450.0 pg/mL Final   • Extra Tube 09/21/2017 hold for add-on   Final    Auto resulted   • Extra Tube 09/21/2017 Hold for add-ons.   Final    Auto resulted.   • Extra Tube 09/21/2017 hold for add-on   Final    Auto resulted   • Extra Tube 09/21/2017 Hold for add-ons.   Final    Auto resulted.   • WBC 09/21/2017 7.14  3.20 - 9.80 10*3/mm3 Final   • RBC 09/21/2017 4.40  4.37 - 5.74 10*6/mm3 Final   • Hemoglobin 09/21/2017 11.7* 13.7 - 17.3 g/dL Final   • Hematocrit 09/21/2017 36.5* 39.0 - 49.0 % Final   • MCV 09/21/2017 83.0  80.0 - 98.0 fL Final   • MCH 09/21/2017 26.6  26.5 - 34.0 pg Final   • MCHC 09/21/2017 32.1  31.5 - 36.3 g/dL Final   • RDW 09/21/2017 15.0* 11.5 - 14.5 % Final   • RDW-SD 09/21/2017 45.8* 35.1 - 43.9 fl Final   • MPV 09/21/2017 9.2  8.0 - 12.0 fL Final   • Platelets 09/21/2017 141* 150 - 450 10*3/mm3 Final   • Neutrophil % 09/21/2017 75.4  37.0 - 80.0 % Final   • Lymphocyte % 09/21/2017 12.6  10.0 - 50.0 % Final   • Monocyte % 09/21/2017 8.0  0.0 - 12.0 % Final   • Eosinophil % 09/21/2017 2.8  0.0 - 7.0 % Final   • Basophil % 09/21/2017 0.1  0.0 - 2.0 % Final   • Immature Grans % 09/21/2017 1.1* 0.0 - 0.5 % Final   • Neutrophils, Absolute 09/21/2017 5.38   2.00 - 8.60 10*3/mm3 Final   • Lymphocytes, Absolute 09/21/2017 0.90  0.60 - 4.20 10*3/mm3 Final   • Monocytes, Absolute 09/21/2017 0.57  0.00 - 0.90 10*3/mm3 Final   • Eosinophils, Absolute 09/21/2017 0.20  0.00 - 0.70 10*3/mm3 Final   • Basophils, Absolute 09/21/2017 0.01  0.00 - 0.20 10*3/mm3 Final   • Immature Grans, Absolute 09/21/2017 0.08* 0.00 - 0.02 10*3/mm3 Final   • Anisocytosis 09/21/2017 Slight/1+  None Seen Final   • Hypochromia 09/21/2017 Slight/1+  None Seen Final   • WBC Morphology 09/21/2017 Normal  Normal Final   • Platelet Estimate 09/21/2017 Decreased  Normal Final   • Glucose 09/21/2017 151* 70 - 130 mg/dL Final    RN NotifiedMeter: [id number]: [id number] [facility]   • Glucose 09/22/2017 183* 70 - 130 mg/dL Final    RN NotifiedMeter: [id number]: [id number] [facility]   • Glucose 09/22/2017 186* 70 - 130 mg/dL Final    RN NotifiedMeter: [id number]: [id number] AJIT SHAFFERISON     Labs have been independently reviewed    This document has been electronically signed by LALA Barajas on December 18, 2017 3:09 PM

## 2017-12-19 ENCOUNTER — TELEPHONE (OUTPATIENT)
Dept: FAMILY MEDICINE CLINIC | Facility: CLINIC | Age: 37
End: 2017-12-19

## 2017-12-19 DIAGNOSIS — E11.8 TYPE 2 DIABETES MELLITUS WITH COMPLICATION, UNSPECIFIED LONG TERM INSULIN USE STATUS: Primary | ICD-10-CM

## 2017-12-19 NOTE — TELEPHONE ENCOUNTER
The patient called me back this morning. He will  his script for Invokana. He thanked us for getting it sent in.

## 2017-12-19 NOTE — TELEPHONE ENCOUNTER
I called the patient and left a voice mail. I need to tell him there was an order placed for Invokana. It was sent to [facility].

## 2017-12-20 ENCOUNTER — APPOINTMENT (OUTPATIENT)
Dept: GENERAL RADIOLOGY | Facility: HOSPITAL | Age: 37
End: 2017-12-20

## 2017-12-20 ENCOUNTER — HOSPITAL ENCOUNTER (OUTPATIENT)
Facility: HOSPITAL | Age: 37
Setting detail: OBSERVATION
Discharge: HOME | End: 2017-12-22
Attending: EMERGENCY MEDICINE | Admitting: INTERNAL MEDICINE

## 2017-12-20 DIAGNOSIS — I27.82 OTHER CHRONIC PULMONARY EMBOLISM WITHOUT ACUTE COR PULMONALE (HCC): ICD-10-CM

## 2017-12-20 DIAGNOSIS — R06.02 SHORTNESS OF BREATH: Primary | ICD-10-CM

## 2017-12-20 DIAGNOSIS — R06.82 TACHYPNEA: ICD-10-CM

## 2017-12-20 DIAGNOSIS — R07.9 CHEST PAIN, UNSPECIFIED TYPE: ICD-10-CM

## 2017-12-20 LAB
ALBUMIN SERPL-MCNC: 4 G/DL (ref 3.4–4.8)
ALBUMIN/GLOB SERPL: 1.1 G/DL (ref 1.1–1.8)
ALP SERPL-CCNC: 81 U/L (ref 38–126)
ALT SERPL W P-5'-P-CCNC: 31 U/L (ref 21–72)
ANION GAP SERPL CALCULATED.3IONS-SCNC: 11 MMOL/L (ref 5–15)
ANION GAP SERPL CALCULATED.3IONS-SCNC: 8 MMOL/L (ref 5–15)
APTT PPP: 38.6 SECONDS (ref 20–40.3)
ARTERIAL PATENCY WRIST A: ABNORMAL
AST SERPL-CCNC: 25 U/L (ref 17–59)
ATMOSPHERIC PRESS: ABNORMAL MMHG
BASE EXCESS BLDA CALC-SCNC: 0.3 MMOL/L (ref -2.4–2.4)
BASOPHILS # BLD AUTO: 0.01 10*3/MM3 (ref 0–0.2)
BASOPHILS # BLD AUTO: 0.02 10*3/MM3 (ref 0–0.2)
BASOPHILS NFR BLD AUTO: 0.1 % (ref 0–2)
BASOPHILS NFR BLD AUTO: 0.3 % (ref 0–2)
BDY SITE: ABNORMAL
BILIRUB SERPL-MCNC: 0.4 MG/DL (ref 0.2–1.3)
BUN BLD-MCNC: 12 MG/DL (ref 7–21)
BUN BLD-MCNC: 13 MG/DL (ref 7–21)
BUN/CREAT SERPL: 14.3 (ref 7–25)
BUN/CREAT SERPL: 16.4 (ref 7–25)
CA-I BLD-MCNC: 4.7 MG/DL (ref 4.5–4.9)
CALCIUM SPEC-SCNC: 8.8 MG/DL (ref 8.4–10.2)
CALCIUM SPEC-SCNC: 8.9 MG/DL (ref 8.4–10.2)
CHLORIDE SERPL-SCNC: 96 MMOL/L (ref 95–110)
CHLORIDE SERPL-SCNC: 96 MMOL/L (ref 95–110)
CK MB SERPL-CCNC: 2.33 NG/ML (ref 0–5)
CO2 BLDA-SCNC: 27.9 MMOL/L (ref 23–27)
CO2 SERPL-SCNC: 27 MMOL/L (ref 22–31)
CO2 SERPL-SCNC: 28 MMOL/L (ref 22–31)
CREAT BLD-MCNC: 0.73 MG/DL (ref 0.7–1.3)
CREAT BLD-MCNC: 0.91 MG/DL (ref 0.7–1.3)
D-DIMER, QUANTITATIVE (MAD,POW, STR): <270 NG/ML (FEU) (ref 0–470)
DEPRECATED RDW RBC AUTO: 42.6 FL (ref 35.1–43.9)
DEPRECATED RDW RBC AUTO: 43.4 FL (ref 35.1–43.9)
EOSINOPHIL # BLD AUTO: 0.28 10*3/MM3 (ref 0–0.7)
EOSINOPHIL # BLD AUTO: 0.29 10*3/MM3 (ref 0–0.7)
EOSINOPHIL NFR BLD AUTO: 3.7 % (ref 0–7)
EOSINOPHIL NFR BLD AUTO: 3.9 % (ref 0–7)
ERYTHROCYTE [DISTWIDTH] IN BLOOD BY AUTOMATED COUNT: 14.5 % (ref 11.5–14.5)
ERYTHROCYTE [DISTWIDTH] IN BLOOD BY AUTOMATED COUNT: 14.5 % (ref 11.5–14.5)
GFR SERPL CREATININE-BSD FRML MDRD: 120 ML/MIN/1.73 (ref 70–162)
GFR SERPL CREATININE-BSD FRML MDRD: 93 ML/MIN/1.73 (ref 60–162)
GLOBULIN UR ELPH-MCNC: 3.8 GM/DL (ref 2.3–3.5)
GLUCOSE BLD-MCNC: 220 MG/DL (ref 60–100)
GLUCOSE BLD-MCNC: 328 MG/DL (ref 60–100)
GLUCOSE BLDA-MCNC: 352 MMOL/L
GLUCOSE BLDC GLUCOMTR-MCNC: 308 MG/DL (ref 70–130)
HCO3 BLDA-SCNC: 26.4 MMOL/L (ref 22–26)
HCT VFR BLD AUTO: 38.7 % (ref 39–49)
HCT VFR BLD AUTO: 38.8 % (ref 39–49)
HCT VFR BLD CALC: 41 % (ref 40–54)
HGB BLD-MCNC: 13.1 G/DL (ref 13.7–17.3)
HGB BLD-MCNC: 13.3 G/DL (ref 13.7–17.3)
HGB BLDA-MCNC: 13.9 G/DL (ref 14–18)
HOLD SPECIMEN: NORMAL
IMM GRANULOCYTES # BLD: 0.11 10*3/MM3 (ref 0–0.02)
IMM GRANULOCYTES # BLD: 0.16 10*3/MM3 (ref 0–0.02)
IMM GRANULOCYTES NFR BLD: 1.4 % (ref 0–0.5)
IMM GRANULOCYTES NFR BLD: 2.2 % (ref 0–0.5)
INR PPP: 1.11 (ref 0.8–1.2)
LYMPHOCYTES # BLD AUTO: 1.43 10*3/MM3 (ref 0.6–4.2)
LYMPHOCYTES # BLD AUTO: 1.64 10*3/MM3 (ref 0.6–4.2)
LYMPHOCYTES NFR BLD AUTO: 18.4 % (ref 10–50)
LYMPHOCYTES NFR BLD AUTO: 22.7 % (ref 10–50)
MCH RBC QN AUTO: 27.6 PG (ref 26.5–34)
MCH RBC QN AUTO: 27.7 PG (ref 26.5–34)
MCHC RBC AUTO-ENTMCNC: 33.9 G/DL (ref 31.5–36.3)
MCHC RBC AUTO-ENTMCNC: 34.3 G/DL (ref 31.5–36.3)
MCV RBC AUTO: 80.8 FL (ref 80–98)
MCV RBC AUTO: 81.6 FL (ref 80–98)
MODALITY: ABNORMAL
MONOCYTES # BLD AUTO: 0.65 10*3/MM3 (ref 0–0.9)
MONOCYTES # BLD AUTO: 0.71 10*3/MM3 (ref 0–0.9)
MONOCYTES NFR BLD AUTO: 8.4 % (ref 0–12)
MONOCYTES NFR BLD AUTO: 9.8 % (ref 0–12)
NEUTROPHILS # BLD AUTO: 4.4 10*3/MM3 (ref 2–8.6)
NEUTROPHILS # BLD AUTO: 5.27 10*3/MM3 (ref 2–8.6)
NEUTROPHILS NFR BLD AUTO: 61.1 % (ref 37–80)
NEUTROPHILS NFR BLD AUTO: 68 % (ref 37–80)
PCO2 BLDA: 48.3 MM HG (ref 35–45)
PH BLDA: 7.36 PH UNITS (ref 7.35–7.45)
PLATELET # BLD AUTO: 160 10*3/MM3 (ref 150–450)
PLATELET # BLD AUTO: 160 10*3/MM3 (ref 150–450)
PMV BLD AUTO: 9 FL (ref 8–12)
PMV BLD AUTO: 9.2 FL (ref 8–12)
PO2 BLDA: 89.4 MM HG (ref 80–105)
POTASSIUM BLD-SCNC: 4 MMOL/L (ref 3.5–5.1)
POTASSIUM BLD-SCNC: 4.3 MMOL/L (ref 3.5–5.1)
POTASSIUM BLDA-SCNC: 4.27 MMOL/L (ref 3.6–4.9)
PROT SERPL-MCNC: 7.8 G/DL (ref 6.3–8.6)
PROTHROMBIN TIME: 14.2 SECONDS (ref 11.1–15.3)
RBC # BLD AUTO: 4.74 10*6/MM3 (ref 4.37–5.74)
RBC # BLD AUTO: 4.8 10*6/MM3 (ref 4.37–5.74)
SAO2 % BLDCOA: 96.1 %
SODIUM BLD-SCNC: 131 MMOL/L (ref 137–145)
SODIUM BLD-SCNC: 135 MMOL/L (ref 137–145)
SODIUM BLDA-SCNC: 133.2 MMOL/L (ref 138–146)
TROPONIN I SERPL-MCNC: <0.012 NG/ML
TROPONIN I SERPL-MCNC: <0.012 NG/ML
WBC NRBC COR # BLD: 7.21 10*3/MM3 (ref 3.2–9.8)
WBC NRBC COR # BLD: 7.76 10*3/MM3 (ref 3.2–9.8)
WHOLE BLOOD HOLD SPECIMEN: NORMAL
WHOLE BLOOD HOLD SPECIMEN: NORMAL

## 2017-12-20 PROCEDURE — 93010 ELECTROCARDIOGRAM REPORT: CPT | Performed by: INTERNAL MEDICINE

## 2017-12-20 PROCEDURE — G0378 HOSPITAL OBSERVATION PER HR: HCPCS

## 2017-12-20 PROCEDURE — 71010 HC CHEST PA OR AP: CPT

## 2017-12-20 PROCEDURE — 82803 BLOOD GASES ANY COMBINATION: CPT | Performed by: PHYSICIAN ASSISTANT

## 2017-12-20 PROCEDURE — 85730 THROMBOPLASTIN TIME PARTIAL: CPT | Performed by: PHYSICIAN ASSISTANT

## 2017-12-20 PROCEDURE — 96374 THER/PROPH/DIAG INJ IV PUSH: CPT

## 2017-12-20 PROCEDURE — 85610 PROTHROMBIN TIME: CPT | Performed by: PHYSICIAN ASSISTANT

## 2017-12-20 PROCEDURE — 84484 ASSAY OF TROPONIN QUANT: CPT | Performed by: NURSE PRACTITIONER

## 2017-12-20 PROCEDURE — 96375 TX/PRO/DX INJ NEW DRUG ADDON: CPT

## 2017-12-20 PROCEDURE — 85379 FIBRIN DEGRADATION QUANT: CPT | Performed by: PHYSICIAN ASSISTANT

## 2017-12-20 PROCEDURE — 94760 N-INVAS EAR/PLS OXIMETRY 1: CPT

## 2017-12-20 PROCEDURE — 80053 COMPREHEN METABOLIC PANEL: CPT | Performed by: PHYSICIAN ASSISTANT

## 2017-12-20 PROCEDURE — 85025 COMPLETE CBC W/AUTO DIFF WBC: CPT | Performed by: PHYSICIAN ASSISTANT

## 2017-12-20 PROCEDURE — 36600 WITHDRAWAL OF ARTERIAL BLOOD: CPT

## 2017-12-20 PROCEDURE — 85025 COMPLETE CBC W/AUTO DIFF WBC: CPT | Performed by: NURSE PRACTITIONER

## 2017-12-20 PROCEDURE — 25010000002 HYDROMORPHONE PER 4 MG: Performed by: EMERGENCY MEDICINE

## 2017-12-20 PROCEDURE — 82553 CREATINE MB FRACTION: CPT | Performed by: PHYSICIAN ASSISTANT

## 2017-12-20 PROCEDURE — 93005 ELECTROCARDIOGRAM TRACING: CPT | Performed by: PHYSICIAN ASSISTANT

## 2017-12-20 PROCEDURE — 84484 ASSAY OF TROPONIN QUANT: CPT | Performed by: PHYSICIAN ASSISTANT

## 2017-12-20 PROCEDURE — 82962 GLUCOSE BLOOD TEST: CPT

## 2017-12-20 PROCEDURE — 99284 EMERGENCY DEPT VISIT MOD MDM: CPT

## 2017-12-20 PROCEDURE — 25010000002 MORPHINE PER 10 MG: Performed by: INTERNAL MEDICINE

## 2017-12-20 PROCEDURE — 63710000001 INSULIN ASPART PER 5 UNITS: Performed by: NURSE PRACTITIONER

## 2017-12-20 PROCEDURE — 94640 AIRWAY INHALATION TREATMENT: CPT

## 2017-12-20 RX ORDER — SODIUM CHLORIDE 0.9 % (FLUSH) 0.9 %
1-10 SYRINGE (ML) INJECTION AS NEEDED
Status: DISCONTINUED | OUTPATIENT
Start: 2017-12-20 | End: 2017-12-22 | Stop reason: HOSPADM

## 2017-12-20 RX ORDER — SODIUM CHLORIDE 0.9 % (FLUSH) 0.9 %
10 SYRINGE (ML) INJECTION AS NEEDED
Status: DISCONTINUED | OUTPATIENT
Start: 2017-12-20 | End: 2017-12-22 | Stop reason: HOSPADM

## 2017-12-20 RX ORDER — ACETAMINOPHEN 325 MG/1
650 TABLET ORAL EVERY 4 HOURS PRN
Status: DISCONTINUED | OUTPATIENT
Start: 2017-12-20 | End: 2017-12-22 | Stop reason: HOSPADM

## 2017-12-20 RX ORDER — AMLODIPINE BESYLATE 10 MG/1
10 TABLET ORAL NIGHTLY
Status: DISCONTINUED | OUTPATIENT
Start: 2017-12-20 | End: 2017-12-22 | Stop reason: HOSPADM

## 2017-12-20 RX ORDER — CLOPIDOGREL BISULFATE 75 MG/1
75 TABLET ORAL DAILY
Status: DISCONTINUED | OUTPATIENT
Start: 2017-12-20 | End: 2017-12-22 | Stop reason: HOSPADM

## 2017-12-20 RX ORDER — MORPHINE SULFATE 8 MG/ML
3 INJECTION INTRAMUSCULAR; INTRAVENOUS; SUBCUTANEOUS
Status: DISCONTINUED | OUTPATIENT
Start: 2017-12-20 | End: 2017-12-22 | Stop reason: HOSPADM

## 2017-12-20 RX ORDER — SERTRALINE HYDROCHLORIDE 25 MG/1
25 TABLET, FILM COATED ORAL DAILY
Status: DISCONTINUED | OUTPATIENT
Start: 2017-12-20 | End: 2017-12-22 | Stop reason: HOSPADM

## 2017-12-20 RX ORDER — ALBUTEROL SULFATE 2.5 MG/3ML
1.25 SOLUTION RESPIRATORY (INHALATION) EVERY 6 HOURS PRN
Status: DISCONTINUED | OUTPATIENT
Start: 2017-12-20 | End: 2017-12-22 | Stop reason: HOSPADM

## 2017-12-20 RX ORDER — NICOTINE POLACRILEX 4 MG
15 LOZENGE BUCCAL
Status: DISCONTINUED | OUTPATIENT
Start: 2017-12-20 | End: 2017-12-22 | Stop reason: HOSPADM

## 2017-12-20 RX ORDER — IPRATROPIUM BROMIDE AND ALBUTEROL SULFATE 2.5; .5 MG/3ML; MG/3ML
3 SOLUTION RESPIRATORY (INHALATION)
Status: DISCONTINUED | OUTPATIENT
Start: 2017-12-20 | End: 2017-12-22 | Stop reason: HOSPADM

## 2017-12-20 RX ORDER — ISOSORBIDE MONONITRATE 60 MG/1
120 TABLET, EXTENDED RELEASE ORAL DAILY
Status: DISCONTINUED | OUTPATIENT
Start: 2017-12-20 | End: 2017-12-22 | Stop reason: HOSPADM

## 2017-12-20 RX ORDER — TRAZODONE HYDROCHLORIDE 150 MG/1
300 TABLET ORAL NIGHTLY
Status: DISCONTINUED | OUTPATIENT
Start: 2017-12-20 | End: 2017-12-22 | Stop reason: HOSPADM

## 2017-12-20 RX ORDER — PANTOPRAZOLE SODIUM 40 MG/1
40 TABLET, DELAYED RELEASE ORAL NIGHTLY
Status: DISCONTINUED | OUTPATIENT
Start: 2017-12-20 | End: 2017-12-22 | Stop reason: HOSPADM

## 2017-12-20 RX ORDER — ATORVASTATIN CALCIUM 20 MG/1
20 TABLET, FILM COATED ORAL DAILY
Status: DISCONTINUED | OUTPATIENT
Start: 2017-12-20 | End: 2017-12-22 | Stop reason: HOSPADM

## 2017-12-20 RX ORDER — RANOLAZINE 500 MG/1
1000 TABLET, EXTENDED RELEASE ORAL EVERY 12 HOURS SCHEDULED
Status: DISCONTINUED | OUTPATIENT
Start: 2017-12-20 | End: 2017-12-22 | Stop reason: HOSPADM

## 2017-12-20 RX ORDER — DEXTROSE MONOHYDRATE 25 G/50ML
25 INJECTION, SOLUTION INTRAVENOUS
Status: DISCONTINUED | OUTPATIENT
Start: 2017-12-20 | End: 2017-12-22 | Stop reason: HOSPADM

## 2017-12-20 RX ORDER — LISINOPRIL 40 MG/1
40 TABLET ORAL EVERY MORNING
Status: DISCONTINUED | OUTPATIENT
Start: 2017-12-21 | End: 2017-12-22 | Stop reason: HOSPADM

## 2017-12-20 RX ADMIN — RANOLAZINE 1000 MG: 500 TABLET, FILM COATED, EXTENDED RELEASE ORAL at 21:09

## 2017-12-20 RX ADMIN — MORPHINE SULFATE 3 MG: 8 INJECTION, SOLUTION INTRAMUSCULAR; INTRAVENOUS at 22:43

## 2017-12-20 RX ADMIN — RIVAROXABAN 15 MG: 15 TABLET, FILM COATED ORAL at 21:09

## 2017-12-20 RX ADMIN — SERTRALINE HYDROCHLORIDE 25 MG: 25 TABLET ORAL at 21:10

## 2017-12-20 RX ADMIN — TRAZODONE HYDROCHLORIDE 300 MG: 150 TABLET ORAL at 21:09

## 2017-12-20 RX ADMIN — AMLODIPINE BESYLATE 10 MG: 10 TABLET ORAL at 21:09

## 2017-12-20 RX ADMIN — ATORVASTATIN CALCIUM 20 MG: 20 TABLET, FILM COATED ORAL at 21:09

## 2017-12-20 RX ADMIN — METFORMIN HYDROCHLORIDE 1000 MG: 500 TABLET ORAL at 21:10

## 2017-12-20 RX ADMIN — HYDROMORPHONE HYDROCHLORIDE 1 MG: 1 INJECTION, SOLUTION INTRAMUSCULAR; INTRAVENOUS; SUBCUTANEOUS at 15:55

## 2017-12-20 RX ADMIN — PANTOPRAZOLE SODIUM 40 MG: 40 TABLET, DELAYED RELEASE ORAL at 21:09

## 2017-12-20 RX ADMIN — INSULIN ASPART 10 UNITS: 100 INJECTION, SOLUTION INTRAVENOUS; SUBCUTANEOUS at 21:23

## 2017-12-20 RX ADMIN — CLOPIDOGREL BISULFATE 75 MG: 75 TABLET ORAL at 21:09

## 2017-12-20 RX ADMIN — IPRATROPIUM BROMIDE AND ALBUTEROL SULFATE 3 ML: 2.5; .5 SOLUTION RESPIRATORY (INHALATION) at 19:48

## 2017-12-20 RX ADMIN — ISOSORBIDE MONONITRATE 120 MG: 60 TABLET, EXTENDED RELEASE ORAL at 21:09

## 2017-12-20 NOTE — ED NOTES
"Pt reports dyspnea upon exertion and the feeling of \"two bricks on either side of my chest. Pt is aware of multiple bilateral PEs. Pt self administered tylenol around 1030.     Brad Atkins RN  12/20/17 1300    "

## 2017-12-20 NOTE — H&P
[facility] Medicine Admission      Date of Admission: 12/20/2017      Primary Care Physician: LALA Barajas      Chief Complaint: Dyspnea, chest heaviness    HPI:  This is a 37 year old male with a significant medical history of a factor II multation, chronic/recurrent pulmonary emboli, CAD, morbid obesity, and ADOLFO who presents with dyspnea on exertion and a sensation of bricks sitting on his chest.  He was recently seen and changed back to Xarelto for his pulmonary emboli because his condition worsened on Eliquis and coumadin previously.  His ED evaluation is essentially unremarkable considering his co-morbidities. No other known aggravating or alleviating factors.  No other associated symptoms.     Concurrent Medical History:  has a past medical history of Allergic rhinitis; Asthma; Chest pain; CHF (congestive heart failure); Chronic back pain; Coronary artery disease; Diabetes mellitus; Factor II deficiency; GERD (gastroesophageal reflux disease); Hyperlipidemia; Hypertension; Low back pain; Morbid obesity; Pulmonary embolism; RLS (restless legs syndrome); Seizures; and Sleep apnea.    Past Surgical History:   Past Surgical History:   Procedure Laterality Date   • ADENOIDECTOMY     • CARDIAC CATHETERIZATION N/A 3/15/2017    Procedure: Left Heart Cath;  Surgeon: Edwige Briceno MD;  Location: [facility] INVASIVE LOCATION;  Service:    • CARDIAC CATHETERIZATION N/A 3/15/2017    Procedure: Stent SOPHIA coronary;  Surgeon: Edwige Briceno MD;  Location: [facility] INVASIVE LOCATION;  Service:    • CHOLECYSTECTOMY     • CORONARY ANGIOPLASTY WITH STENT PLACEMENT     • VT RT/LT HEART CATHETERS N/A 3/15/2017    Procedure: Percutaneous Coronary Intervention;  Surgeon: Edwige Briceno MD;  Location: [facility] INVASIVE LOCATION;  Service: Cardiovascular   • TONSILLECTOMY     • TRANSESOPHAGEAL ECHOCARDIOGRAM (MONICA)         Family History:   Family History   Problem Relation Age of  Onset   • Cancer Other    • Coronary artery disease Other    • Diabetes Other    • Heart disease Other    • Hypertension Other        Social History:   Social History     Social History   • Marital status:      Spouse name: Cori   • Number of children: 0   • Years of education: N/A     Occupational History   • Disabled      Social History Main Topics   • Smoking status: Former Smoker     Quit date: 1997   • Smokeless tobacco: Former User     Types: Snuff     Quit date: 2017   • Alcohol use No   • Drug use: No   • Sexual activity: Defer     Other Topics Concern   • Not on file     Social History Narrative       Allergies:   Allergies   Allergen Reactions   • Glipizide Other (See Comments)     Hallucinations     • Phenergan [Promethazine Hcl] Other (See Comments)     Burning veins   • Risperdal [Risperidone] Other (See Comments)     Slurred speech   • Tramadol Other (See Comments)     seizures   • Reglan [Metoclopramide] Anxiety       Medications:   No current facility-administered medications on file prior to encounter.      Current Outpatient Prescriptions on File Prior to Encounter   Medication Sig Dispense Refill   • albuterol (ACCUNEB) 1.25 MG/3ML nebulizer solution Take 3 mL by nebulization Every 6 (Six) Hours As Needed for Wheezing. 100 vial 0   • albuterol (PROVENTIL HFA) 108 (90 Base) MCG/ACT inhaler EVERY FOUR HOURS as needed for SOB/WHEEZING     • albuterol (PROVENTIL HFA;VENTOLIN HFA) 108 (90 BASE) MCG/ACT inhaler Inhale 2 puffs Every 4 (Four) Hours As Needed for Wheezing.     • amLODIPine (NORVASC) 10 MG tablet Take 1 tablet by mouth Every Night. 30 tablet 5   • Canagliflozin 100 MG tablet Take 1 tablet by mouth Every Morning Before Breakfast. 90 tablet 2   • clopidogrel (PLAVIX) 75 MG tablet Take 1 tablet by mouth Daily. 30 tablet 3   • ipratropium-albuterol (DUO-NEB) 0.5-2.5 mg/mL nebulizer EVERY FOUR HOURS as needed for SHORTNESS OF BREATH     • isosorbide mononitrate (IMDUR) 120 MG 24 hr  tablet Take 120 mg by mouth Daily.     • lisinopril (PRINIVIL,ZESTRIL) 40 MG tablet Take 1 tablet by mouth Every Morning. 30 tablet 5   • metFORMIN (GLUCOPHAGE) 1000 MG tablet TWICE A DAY     • metoprolol succinate XL (TOPROL-XL) 200 MG 24 hr tablet Take 200 mg by mouth.     • MICROLET LANCETS misc USE TO TEST BLOOD SUGAR EVERY DAY AND AS NEEDED     • nitroglycerin (NITROSTAT) 0.4 MG SL tablet Place 1 tablet under the tongue Every 5 (Five) Minutes As Needed for Chest Pain. Take no more than 3 doses in 15 minutes. 100 tablet 12   • oxymetazoline (AFRIN) 0.05 % nasal spray 2 sprays into each nostril 2 (Two) Times a Day. 20 mL 0   • pantoprazole (PROTONIX) 40 MG EC tablet Take 1 tablet by mouth Every Night. 30 tablet 5   • pramipexole (MIRAPEX) 1 MG tablet Take 2 tablets by mouth Every Night. Taking 2mg daily 60 tablet 5   • pravastatin (PRAVACHOL) 80 MG tablet Take 1 tablet by mouth Every Night. 30 tablet 5   • ranolazine (RANEXA) 1000 MG 12 hr tablet Take 1 tablet by mouth Every 12 (Twelve) Hours. 60 tablet 5   • rivaroxaban (XARELTO) 15 MG tablet Take 1 tablet by mouth 2 (Two) Times a Day With Meals. 42 tablet 0   • sertraline (ZOLOFT) 25 MG tablet Take 1 tablet by mouth Daily. 30 tablet 5   • traZODone (DESYREL) 300 MG tablet Take 1 tablet by mouth Every Night. 30 tablet 5         Review of Systems:  Review of Systems   Constitutional: Negative for chills, fatigue and fever.   Eyes: Negative for visual disturbance.   Respiratory: Positive for shortness of breath. Negative for cough, chest tightness and wheezing.    Cardiovascular: Positive for chest pain. Negative for palpitations and leg swelling.   Gastrointestinal: Negative for abdominal pain, constipation, diarrhea, nausea and vomiting.   Genitourinary: Negative for difficulty urinating.   Musculoskeletal: Negative for back pain and neck pain.   Skin: Negative for rash and wound.   Neurological: Negative for dizziness, syncope, weakness, light-headedness,  numbness and headaches.   All other systems reviewed and are negative.     Otherwise complete ROS is negative except as mentioned above.    Physical Exam:   Temp:  [97.2 °F (36.2 °C)] 97.2 °F (36.2 °C)  Heart Rate:  [70-75] 70  Resp:  [24] 24  BP: (148-175)/(70-81) 169/81  Physical Exam   Constitutional: He is oriented to person, place, and time. He appears well-developed and well-nourished. No distress.   Morbid obesity   HENT:   Head: Normocephalic and atraumatic.   Mouth/Throat: Oropharynx is clear and moist.   Eyes: Conjunctivae and EOM are normal.   Neck: Normal range of motion. Neck supple.   Cardiovascular: Normal rate, regular rhythm, normal heart sounds and intact distal pulses.  Exam reveals no gallop and no friction rub.    No murmur heard.  Pulmonary/Chest: Effort normal. No respiratory distress. He has decreased breath sounds. He has no wheezes. He has no rales. He exhibits no tenderness.   Abdominal: Soft. Bowel sounds are normal. He exhibits no distension. There is no tenderness.   Musculoskeletal: Normal range of motion. He exhibits no edema or deformity.   Neurological: He is alert and oriented to person, place, and time.   Skin: Skin is warm and dry. He is not diaphoretic. No erythema.   Vitals reviewed.        Results Reviewed:  I have personally reviewed current lab, radiology, and data and agree with results.  Lab Results (last 24 hours)     Procedure Component Value Units Date/Time    CBC & Differential [[id number]] Collected:  12/20/17 1331    Specimen:  Blood Updated:  12/20/17 1336    Narrative:       The following orders were created for panel order CBC & Differential.  Procedure                               Abnormality         Status                     ---------                               -----------         ------                     CBC Auto Differential[[id number]]        Abnormal            Final result                 Please view results for these tests on the individual orders.     CBC Auto Differential [[id number]]  (Abnormal) Collected:  12/20/17 1331    Specimen:  Blood Updated:  12/20/17 1336     WBC 7.76 10*3/mm3      RBC 4.74 10*6/mm3      Hemoglobin 13.1 (L) g/dL      Hematocrit 38.7 (L) %      MCV 81.6 fL      MCH 27.6 pg      MCHC 33.9 g/dL      RDW 14.5 %      RDW-SD 43.4 fl      MPV 9.2 fL      Platelets 160 10*3/mm3      Neutrophil % 68.0 %      Lymphocyte % 18.4 %      Monocyte % 8.4 %      Eosinophil % 3.7 %      Basophil % 0.1 %      Immature Grans % 1.4 (H) %      Neutrophils, Absolute 5.27 10*3/mm3      Lymphocytes, Absolute 1.43 10*3/mm3      Monocytes, Absolute 0.65 10*3/mm3      Eosinophils, Absolute 0.29 10*3/mm3      Basophils, Absolute 0.01 10*3/mm3      Immature Grans, Absolute 0.11 (H) 10*3/mm3     Protime-INR [[id number]]  (Normal) Collected:  12/20/17 1331    Specimen:  Blood Updated:  12/20/17 1353     Protime 14.2 Seconds      INR 1.11    Narrative:       Therapeutic range for most indications is 2.0-3.0 INR,  or 2.5-3.5 for mechanical heart valves.    aPTT [[id number]]  (Normal) Collected:  12/20/17 1331    Specimen:  Blood Updated:  12/20/17 1354     PTT 38.6 seconds     Narrative:       The recommended Heparin therapeutic range is 68-97 seconds.    D-dimer, Quantitative [[id number]]  (Normal) Collected:  12/20/17 1331    Specimen:  Blood Updated:  12/20/17 1355     D-Dimer, Quantitative <270 ng/mL (FEU)     Narrative:       Dimer values <500 ng/ml FEU are FDA approved as aid in diagnosis of deep venous thrombosis and pulmonary embolism.  This test should not be used in an exclusion strategy with pretest probability alone.    A recent guideline regarding diagnosis for pulmonary thomboembolism recommends an adjusted exclusion criterion of age x 10 ng/ml FEU for patients >50 years of age ([employer]ori Intern Med 2015; 163: 701-711).    Comprehensive Metabolic Panel [[id number]]  (Abnormal) Collected:  12/20/17 1357    Specimen:  Blood Updated:  12/20/17 1412     Glucose 328 (H)  mg/dL      BUN 13 mg/dL      Creatinine 0.91 mg/dL      Sodium 135 (L) mmol/L      Potassium 4.3 mmol/L      Chloride 96 mmol/L      CO2 28.0 mmol/L      Calcium 8.8 mg/dL      Total Protein 7.8 g/dL      Albumin 4.00 g/dL      ALT (SGPT) 31 U/L      AST (SGOT) 25 U/L      Alkaline Phosphatase 81 U/L      Total Bilirubin 0.4 mg/dL      eGFR Non African Amer 93 mL/min/1.73      Globulin 3.8 (H) gm/dL      A/G Ratio 1.1 g/dL      BUN/Creatinine Ratio 14.3     Anion Gap 11.0 mmol/L     CK-MB [[id number]]  (Normal) Collected:  12/20/17 1357    Specimen:  Blood Updated:  12/20/17 1425     CKMB 2.33 ng/mL     Troponin [[id number]]  (Normal) Collected:  12/20/17 1357    Specimen:  Blood Updated:  12/20/17 1425     Troponin I <0.012 ng/mL     Blood Gas, Arterial [[id number]]  (Abnormal) Collected:  12/20/17 1439    Specimen:  Arterial Blood Updated:  12/20/17 1441     Site --      Not performed at this site.        Cory's Test --      Not performed at this site.        pH, Arterial 7.356 pH units      pCO2, Arterial 48.3 (H) mm Hg      pO2, Arterial 89.4 mm Hg      HCO3, Arterial 26.4 (H) mmol/L      Base Excess, Arterial 0.3 mmol/L      O2 Saturation, Arterial 96.1 %      Hemoglobin, Blood Gas 13.9 (L) g/dL      Hematocrit, Blood Gas 41.0 %      CO2 Content 27.9 (H)     Sodium, Arterial 133.2 (L) mmol/L      Potassium, Arterial 4.27 mmol/L      Glucose, Arterial 352 mmol/L      Barometric Pressure for Blood Gas -- mmHg       Not performed at this site.        Modality --      Not performed at this site.        Ionized Calcium 4.70 mg/dL     Extra Tubes [[id number]] Collected:  12/20/17 1357    Specimen:  Blood from Blood, Venous Line Updated:  12/20/17 1504    Narrative:       The following orders were created for panel order Extra Tubes.  Procedure                               Abnormality         Status                     ---------                               -----------         ------                     Light Blue  Top[[id number]]                                   Final result               Gold Top - SST[[id number]]                                   Final result                 Please view results for these tests on the individual orders.    Light Blue Top [[id number]] Collected:  12/20/17 1357    Specimen:  Blood Updated:  12/20/17 1501     Extra Tube hold for add-on      Auto resulted       Gold Top - SST [[id number]] Collected:  12/20/17 1357    Specimen:  Blood Updated:  12/20/17 1501     Extra Tube Hold for add-ons.      Auto resulted.           Imaging Results (last 24 hours)     Procedure Component Value Units Date/Time    XR Chest 1 View [[id number]] Collected:  12/20/17 1311     Updated:  12/20/17 1336    Narrative:         PORTABLE CHEST    HISTORY: Chest pain    Portable AP upright film of the chest was obtained at 12:43 PM.  COMPARISON: December 12, 2017    EKG leads.  The lungs are clear of an acute process.  Stable minimal cardiomegaly.  The pulmonary vasculature is not increased.  No pleural effusion.  No pneumothorax.  No acute osseous abnormality.      Impression:       CONCLUSION:  No Acute Disease.  Stable minimal cardiomegaly.    [id number]    Electronically signed by:  Kulwant Currie MD  12/20/2017 1:35 PM  [employer] Workstation: [id number]            Assessment:    Principal Problem:    Shortness of breath  Active Problems:    Coronary arteriosclerosis in native artery    Morbid obesity    Obstructive sleep apnea syndrome    Hypertension    Pulmonary embolism    Type 2 diabetes mellitus    Factor II deficiency            Plan:  1) Observation, Telemetry  2) Dyspnea is multifactoral from CAD, Pulmonary Emboli, Morbid Obesity, and ADOLFO  3) Continue Xarelto acute dosing as recommended last admission  4) Wean O2    I discussed the patients findings and my recommendations with: patient    Artur Cheema Esther, APRN  12/20/17  5:14 PM

## 2017-12-20 NOTE — ED PROVIDER NOTES
Subjective   Patient is a 37 y.o. male presenting with shortness of breath.   History provided by:  Patient   used: No    Shortness of Breath   Severity:  Moderate  Onset quality:  Gradual  Duration:  2 days  Timing:  Constant  Progression:  Worsening  Chronicity:  Recurrent  Context: not activity, not animal exposure, not emotional upset, not fumes, not known allergens, not occupational exposure, not pollens, not smoke exposure, not strong odors, not URI and not weather changes    Relieved by:  Nothing  Worsened by:  Exertion and movement  Ineffective treatments:  None tried  Associated symptoms: chest pain and wheezing    Associated symptoms: no abdominal pain, no claudication, no cough, no diaphoresis, no ear pain, no fever, no headaches, no hemoptysis, no neck pain, no PND, no rash, no sore throat, no sputum production, no syncope, no swollen glands and no vomiting    Risk factors: hx of PE/DVT, obesity and tobacco use    Risk factors: no recent alcohol use, no family hx of DVT, no hx of cancer, no oral contraceptive use, no prolonged immobilization and no recent surgery        Review of Systems   Constitutional: Negative.  Negative for diaphoresis and fever.   HENT: Negative.  Negative for ear pain and sore throat.    Eyes: Negative.    Respiratory: Positive for shortness of breath and wheezing. Negative for apnea, cough, hemoptysis, sputum production, choking, chest tightness and stridor.    Cardiovascular: Positive for chest pain. Negative for palpitations, claudication, leg swelling, syncope and PND.   Gastrointestinal: Negative.  Negative for abdominal pain and vomiting.   Endocrine: Negative.    Genitourinary: Negative.    Musculoskeletal: Negative.  Negative for neck pain.   Skin: Negative.  Negative for rash.   Allergic/Immunologic: Negative.    Neurological: Negative.  Negative for headaches.   Hematological: Negative.    Psychiatric/Behavioral: Negative.        Past Medical History:  "  Diagnosis Date   • Allergic rhinitis    • Asthma    • Chest pain    • CHF (congestive heart failure)    • Chronic back pain    • Coronary artery disease     2 stents.  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\"   • GERD (gastroesophageal reflux disease)    • Hyperlipidemia    • Hypertension    • Low back pain    • Morbid obesity    • Pulmonary embolism    • RLS (restless legs syndrome)    • Seizures    • Sleep apnea        Allergies   Allergen Reactions   • Glipizide Other (See Comments)     Hallucinations     • Phenergan [Promethazine Hcl] Other (See Comments)     Burning veins   • Risperdal [Risperidone] Other (See Comments)     Slurred speech   • Tramadol Other (See Comments)     seizures   • Reglan [Metoclopramide] Anxiety       Past Surgical History:   Procedure Laterality Date   • ADENOIDECTOMY     • CARDIAC CATHETERIZATION N/A 3/15/2017    Procedure: Left Heart Cath;  Surgeon: Edwige Dillard MD;  Location: [facility] CATH INVASIVE LOCATION;  Service:    • CARDIAC CATHETERIZATION N/A 3/15/2017    Procedure: Stent SOPHIA coronary;  Surgeon: Edwige Dillard MD;  Location: [facility] CATH INVASIVE LOCATION;  Service:    • CHOLECYSTECTOMY     • CORONARY ANGIOPLASTY WITH STENT PLACEMENT     • ID RT/LT HEART CATHETERS N/A 3/15/2017    Procedure: Percutaneous Coronary Intervention;  Surgeon: Edwige Dillard MD;  Location: [facility] CATH INVASIVE LOCATION;  Service: Cardiovascular   • TONSILLECTOMY     • TRANSESOPHAGEAL ECHOCARDIOGRAM (MONICA)         Family History   Problem Relation Age of Onset   • Cancer Other    • Coronary artery disease Other    • Diabetes Other    • Heart disease Other    • Hypertension Other        Social History     Social History   • Marital status:      Spouse name: Cori   • Number of children: 0   • Years of education: N/A     Occupational History   • Disabled      Social History Main Topics   • Smoking status: Former Smoker     Quit date: " 1997   • Smokeless tobacco: Former User     Types: Snuff     Quit date: 2017   • Alcohol use No   • Drug use: No   • Sexual activity: Defer     Other Topics Concern   • None     Social History Narrative           Objective   Physical Exam   Constitutional: He appears well-developed and well-nourished. No distress. He is not intubated.   HENT:   Head: Normocephalic and atraumatic.   Mouth/Throat: No oropharyngeal exudate.   Eyes: Conjunctivae and EOM are normal. Pupils are equal, round, and reactive to light. Left eye exhibits no discharge. No scleral icterus.   Cardiovascular: Normal rate, regular rhythm, normal heart sounds and intact distal pulses.  Exam reveals no gallop and no friction rub.    No murmur heard.  Pulmonary/Chest: No accessory muscle usage. Tachypnea noted. No apnea and no bradypnea. He is not intubated. No respiratory distress. He has decreased breath sounds. He has wheezes. He has no rhonchi. He has no rales.   Musculoskeletal: Normal range of motion. He exhibits no edema, tenderness or deformity.   Skin: He is not diaphoretic.   Nursing note and vitals reviewed.      Procedures         ED Course  ED Course      Labs Reviewed   COMPREHENSIVE METABOLIC PANEL - Abnormal; Notable for the following:        Result Value    Glucose 328 (*)     Sodium 135 (*)     Globulin 3.8 (*)     All other components within normal limits   BLOOD GAS, ARTERIAL - Abnormal; Notable for the following:     pCO2, Arterial 48.3 (*)     HCO3, Arterial 26.4 (*)     Hemoglobin, Blood Gas 13.9 (*)     CO2 Content 27.9 (*)     Sodium, Arterial 133.2 (*)     All other components within normal limits   CBC WITH AUTO DIFFERENTIAL - Abnormal; Notable for the following:     Hemoglobin 13.1 (*)     Hematocrit 38.7 (*)     Immature Grans % 1.4 (*)     Immature Grans, Absolute 0.11 (*)     All other components within normal limits   PROTIME-INR - Normal    Narrative:     Therapeutic range for most indications is 2.0-3.0 INR,  or  2.5-3.5 for mechanical heart valves.   APTT - Normal    Narrative:     The recommended Heparin therapeutic range is 68-97 seconds.   D-DIMER, QUANTITATIVE - Normal    Narrative:     Dimer values <500 ng/ml FEU are FDA approved as aid in diagnosis of deep venous thrombosis and pulmonary embolism.  This test should not be used in an exclusion strategy with pretest probability alone.    A recent guideline regarding diagnosis for pulmonary thomboembolism recommends an adjusted exclusion criterion of age x 10 ng/ml FEU for patients >50 years of age ([employer]ori Intern Med 2015; 163: 701-711).   CK MB - Normal   TROPONIN (IN-HOUSE) - Normal   CBC AND DIFFERENTIAL    Narrative:     The following orders were created for panel order CBC & Differential.  Procedure                               Abnormality         Status                     ---------                               -----------         ------                     CBC Auto Differential[[id number]]        Abnormal            Final result                 Please view results for these tests on the individual orders.   EXTRA TUBES    Narrative:     The following orders were created for panel order Extra Tubes.  Procedure                               Abnormality         Status                     ---------                               -----------         ------                     Light Blue Top[[id number]]                                   Final result               Gold Top - SST[[id number]]                                   Final result                 Please view results for these tests on the individual orders.   LIGHT BLUE TOP   GOLD TOP - SST     Xr Chest 1 View    Result Date: 12/20/2017  Narrative: PORTABLE CHEST HISTORY: Chest pain Portable AP upright film of the chest was obtained at 12:43 PM. COMPARISON: December 12, 2017 EKG leads. The lungs are clear of an acute process. Stable minimal cardiomegaly. The pulmonary vasculature is not increased. No pleural effusion.  No pneumothorax. No acute osseous abnormality.     Impression: CONCLUSION: No Acute Disease. Stable minimal cardiomegaly. [id number] Electronically signed by:  Kulwant Currie MD  12/20/2017 1:35 PM CST Workstation: [id number]    Xr Chest 1 View    Result Date: 12/12/2017  Narrative: EXAM:         Radiograph(s), Chest VIEWS:   Portable ; 1     DATE/TIME: 12/12/2017 4:45 PM CST           INDICATION:     Chest Pain triage protocol  COMPARISON:   CXR: 11/14/17      FINDINGS:       - lines/tubes:     none   - cardiac:          size within normal limits       - mediastinum:  contour within normal limits       - lungs:          no focal air space process, pulmonary interstitial edema, nodule(s)/mass           - pleura:          no evidence of  fluid                - osseous:          unremarkable for age                - misc.:       Impression: CONCLUSION:    1. No evidence of an acute cardiopulmonary process.            Electronically signed by:  MIGUEL A Soni MD  12/12/2017 4:46 PM CST Workstation: [id number]    Us Venous Doppler Lower Extremity Bilateral (duplex)    Result Date: 12/13/2017  Narrative: Doppler duplex venous examination bilateral lower extremities. CLINICAL INDICATION: Pulmonary emboli.    COMPARISON: CTA thorax December 12, 2017. FINDINGS: The common femoral,  femoral, and popliteal veins of the bilateral     lower extremity are well identified and compress normally.  Doppler signals are heard either spontaneously or on distal compression.  No evidence of intraluminal thrombus was noted. The visualized posterior calf veins are normal.     Impression: CONCLUSION:  No evidence of deep venous thrombosis in the common femoral, superficial femoral veins, or popliteal veins of the bilateral lower extremities.   Electronically signed by:  Al Rodriguez MD  12/13/2017 5:14 PM CST Workstation: [id number]    Ct Angiogram Chest With Contrast    Result Date: 12/12/2017  Narrative: EXAM:  Computed Tomography with CTA        REGION:  Chest     INDICATION:    dyspnea   - rule out pulmonary embolism     CLINICAL HISTORY:   Multiple prior episodes of pulmonary embolism, currently on anticoagulation.   CORRELATIVE IMAGING:  CTA chest 11/15/17       (previously interpreted as negative) TECHNIQUE:    - PE / vascular protocol    - reconstructions:  axial, coronal, sagittal, obliques    - computer-generated 3D reconstructions (MIPS) were performed.    - contrast:  intravenous Isovue 370, 85 mL                             This exam was performed according to the departmental dose-optimization program which includes automated exposure control, adjustment of the mA and/or kV according to patient size and/or use of iterative reconstruction technique.     COMMENTS: - Pulmonary arterial system:     - Main pulmonary artery trunk:  negative     - Left, right main pulmonary arteries: Positive nonocclusive intraluminal filling defect on the right     - Lobar arteries: Multiple intraluminal filling defects bilaterally, each nonocclusive.     - Segmental arteries: Difficult to assess  - Systemic vascularity (as visualized):       - Aorta:  grossly negative / normal caliber / no dissection       - roots of great vessels:  grossly negative / normal caliber       - SVC / IVC:  grossly negative / normal caliber  - Misc (limited visualization):     - pulmonary parenchyma:  negative     - pleura:  negative     - mediastinal / glendy:  negative     - neck, inferior:  grossly wnl     - subdiaphragmatic structures:  grossly negative (limited evaluation)     - osseous:     - misc:    .        Impression: CONCLUSION:      1.  Bilateral pulmonary emboli. Note is made of the recent prior examination dated 11/14/17, technically limited, grossly negative for the presence of PE. Clinical history provided by the referring physician at the time of critical results notification is at the patient has had multiple prior episodes of PE is directly, currently on Eloquest and  Lovenox.  It is suggested upon completion of therapy that a follow-up examination be performed to assess for the presence of chronic/organized thrombus.         2.  No evidence of pathology associated with the visualized aorta.                          Critical results reporting: ------------------------------ The results of examination have been discussed with Dr. Santosh Sparrow, with read back, immediately following interpretation of the examination on 12/12/17 at 19:41 hours.             Electronically signed by:  MIGUEL A Soni MD  12/12/2017 6:50 PM [facility] Workstation: [id number]                 MDM  Number of Diagnoses or Management Options  Chest pain, unspecified type:   Other chronic pulmonary embolism without acute cor pulmonale:   Shortness of breath:   Tachypnea:       Final diagnoses:   Shortness of breath   Other chronic pulmonary embolism without acute cor pulmonale   Tachypnea   Chest pain, unspecified type            HENNA Candelario  12/20/17 [id number]

## 2017-12-21 PROBLEM — I25.10 CAD (CORONARY ARTERY DISEASE): Chronic | Status: ACTIVE | Noted: 2017-12-21

## 2017-12-21 PROBLEM — E11.9 DIABETES MELLITUS: Chronic | Status: ACTIVE | Noted: 2017-12-21

## 2017-12-21 PROBLEM — I27.82 CHRONIC PULMONARY EMBOLISM (HCC): Chronic | Status: ACTIVE | Noted: 2017-04-22

## 2017-12-21 LAB
ANION GAP SERPL CALCULATED.3IONS-SCNC: 8 MMOL/L (ref 5–15)
BASOPHILS # BLD AUTO: 0.01 10*3/MM3 (ref 0–0.2)
BASOPHILS NFR BLD AUTO: 0.1 % (ref 0–2)
BUN BLD-MCNC: 12 MG/DL (ref 7–21)
BUN/CREAT SERPL: 16.7 (ref 7–25)
CALCIUM SPEC-SCNC: 8.6 MG/DL (ref 8.4–10.2)
CHLORIDE SERPL-SCNC: 97 MMOL/L (ref 95–110)
CO2 SERPL-SCNC: 27 MMOL/L (ref 22–31)
CREAT BLD-MCNC: 0.72 MG/DL (ref 0.7–1.3)
DEPRECATED RDW RBC AUTO: 42.9 FL (ref 35.1–43.9)
EOSINOPHIL # BLD AUTO: 0.23 10*3/MM3 (ref 0–0.7)
EOSINOPHIL NFR BLD AUTO: 3.1 % (ref 0–7)
ERYTHROCYTE [DISTWIDTH] IN BLOOD BY AUTOMATED COUNT: 14.6 % (ref 11.5–14.5)
GFR SERPL CREATININE-BSD FRML MDRD: 122 ML/MIN/1.73 (ref 70–162)
GLUCOSE BLD-MCNC: 227 MG/DL (ref 60–100)
GLUCOSE BLDC GLUCOMTR-MCNC: 204 MG/DL (ref 70–130)
GLUCOSE BLDC GLUCOMTR-MCNC: 238 MG/DL (ref 70–130)
GLUCOSE BLDC GLUCOMTR-MCNC: 249 MG/DL (ref 70–130)
HCT VFR BLD AUTO: 38 % (ref 39–49)
HGB BLD-MCNC: 12.7 G/DL (ref 13.7–17.3)
IMM GRANULOCYTES # BLD: 0.18 10*3/MM3 (ref 0–0.02)
IMM GRANULOCYTES NFR BLD: 2.4 % (ref 0–0.5)
LYMPHOCYTES # BLD AUTO: 1.66 10*3/MM3 (ref 0.6–4.2)
LYMPHOCYTES NFR BLD AUTO: 22.1 % (ref 10–50)
MCH RBC QN AUTO: 27.3 PG (ref 26.5–34)
MCHC RBC AUTO-ENTMCNC: 33.4 G/DL (ref 31.5–36.3)
MCV RBC AUTO: 81.5 FL (ref 80–98)
MONOCYTES # BLD AUTO: 0.64 10*3/MM3 (ref 0–0.9)
MONOCYTES NFR BLD AUTO: 8.5 % (ref 0–12)
NEUTROPHILS # BLD AUTO: 4.79 10*3/MM3 (ref 2–8.6)
NEUTROPHILS NFR BLD AUTO: 63.8 % (ref 37–80)
PLATELET # BLD AUTO: 173 10*3/MM3 (ref 150–450)
PMV BLD AUTO: 9.4 FL (ref 8–12)
POTASSIUM BLD-SCNC: 3.6 MMOL/L (ref 3.5–5.1)
RBC # BLD AUTO: 4.66 10*6/MM3 (ref 4.37–5.74)
SODIUM BLD-SCNC: 132 MMOL/L (ref 137–145)
WBC NRBC COR # BLD: 7.51 10*3/MM3 (ref 3.2–9.8)

## 2017-12-21 PROCEDURE — 63710000001 INSULIN ASPART PER 5 UNITS: Performed by: NURSE PRACTITIONER

## 2017-12-21 PROCEDURE — 85025 COMPLETE CBC W/AUTO DIFF WBC: CPT | Performed by: NURSE PRACTITIONER

## 2017-12-21 PROCEDURE — G0378 HOSPITAL OBSERVATION PER HR: HCPCS

## 2017-12-21 PROCEDURE — 25010000002 MORPHINE PER 10 MG: Performed by: INTERNAL MEDICINE

## 2017-12-21 PROCEDURE — 96376 TX/PRO/DX INJ SAME DRUG ADON: CPT

## 2017-12-21 PROCEDURE — 94799 UNLISTED PULMONARY SVC/PX: CPT

## 2017-12-21 PROCEDURE — 82962 GLUCOSE BLOOD TEST: CPT

## 2017-12-21 PROCEDURE — 80048 BASIC METABOLIC PNL TOTAL CA: CPT | Performed by: NURSE PRACTITIONER

## 2017-12-21 PROCEDURE — 94760 N-INVAS EAR/PLS OXIMETRY 1: CPT

## 2017-12-21 RX ADMIN — RIVAROXABAN 15 MG: 15 TABLET, FILM COATED ORAL at 08:23

## 2017-12-21 RX ADMIN — RANOLAZINE 1000 MG: 500 TABLET, FILM COATED, EXTENDED RELEASE ORAL at 20:08

## 2017-12-21 RX ADMIN — INSULIN ASPART 14 UNITS: 100 INJECTION, SOLUTION INTRAVENOUS; SUBCUTANEOUS at 11:02

## 2017-12-21 RX ADMIN — INSULIN ASPART 5 UNITS: 100 INJECTION, SOLUTION INTRAVENOUS; SUBCUTANEOUS at 20:08

## 2017-12-21 RX ADMIN — ACETAMINOPHEN 650 MG: 325 TABLET ORAL at 04:10

## 2017-12-21 RX ADMIN — RIVAROXABAN 15 MG: 15 TABLET, FILM COATED ORAL at 18:01

## 2017-12-21 RX ADMIN — MORPHINE SULFATE 3 MG: 8 INJECTION, SOLUTION INTRAMUSCULAR; INTRAVENOUS at 10:22

## 2017-12-21 RX ADMIN — LISINOPRIL 40 MG: 40 TABLET ORAL at 06:21

## 2017-12-21 RX ADMIN — IPRATROPIUM BROMIDE AND ALBUTEROL SULFATE 3 ML: 2.5; .5 SOLUTION RESPIRATORY (INHALATION) at 06:48

## 2017-12-21 RX ADMIN — CLOPIDOGREL BISULFATE 75 MG: 75 TABLET ORAL at 08:23

## 2017-12-21 RX ADMIN — MORPHINE SULFATE 3 MG: 8 INJECTION, SOLUTION INTRAMUSCULAR; INTRAVENOUS at 02:24

## 2017-12-21 RX ADMIN — TRAZODONE HYDROCHLORIDE 300 MG: 150 TABLET ORAL at 20:08

## 2017-12-21 RX ADMIN — INSULIN ASPART 5 UNITS: 100 INJECTION, SOLUTION INTRAVENOUS; SUBCUTANEOUS at 08:22

## 2017-12-21 RX ADMIN — ATORVASTATIN CALCIUM 20 MG: 20 TABLET, FILM COATED ORAL at 08:23

## 2017-12-21 RX ADMIN — ISOSORBIDE MONONITRATE 120 MG: 60 TABLET, EXTENDED RELEASE ORAL at 08:22

## 2017-12-21 RX ADMIN — PANTOPRAZOLE SODIUM 40 MG: 40 TABLET, DELAYED RELEASE ORAL at 20:08

## 2017-12-21 RX ADMIN — INSULIN ASPART 5 UNITS: 100 INJECTION, SOLUTION INTRAVENOUS; SUBCUTANEOUS at 18:01

## 2017-12-21 RX ADMIN — IPRATROPIUM BROMIDE AND ALBUTEROL SULFATE 3 ML: 2.5; .5 SOLUTION RESPIRATORY (INHALATION) at 10:51

## 2017-12-21 RX ADMIN — METFORMIN HYDROCHLORIDE 1000 MG: 500 TABLET ORAL at 08:23

## 2017-12-21 RX ADMIN — RANOLAZINE 1000 MG: 500 TABLET, FILM COATED, EXTENDED RELEASE ORAL at 08:23

## 2017-12-21 RX ADMIN — SERTRALINE HYDROCHLORIDE 25 MG: 25 TABLET ORAL at 08:23

## 2017-12-21 RX ADMIN — AMLODIPINE BESYLATE 10 MG: 10 TABLET ORAL at 20:08

## 2017-12-21 RX ADMIN — METFORMIN HYDROCHLORIDE 1000 MG: 500 TABLET ORAL at 18:00

## 2017-12-21 NOTE — CONSULTS
Adult Nutrition  Assessment    Patient Name:  Yordy Hansen  YOB: 1980  MRN: [id number]  Admit Date:  12/20/2017    Assessment Date:  12/21/2017    Comments:  Pt presents w/BMI 62 and 261% IBW, which is compatible w/morbid obesity. Pt has been seen/educated by RD staff during numerous previous admissions. Pt indicates he intends to join weight watchers and a gym in the new year--RD encouraged both. RD also emphasized importance of pt checking blood sugar regularly. RD will monitor.          Reason for Assessment       12/21/17 1242    Reason for Assessment    Reason For Assessment/Visit education    Identified At Risk By Screening Criteria BMI                Nutrition/Diet History       12/21/17 1242    Nutrition/Diet History    Typical Food/Fluid Intake Pt says he is interested in joining weight watchers and a gym.                         Electronically signed by:  Afia Chen RD  12/21/17 12:44 PM

## 2017-12-21 NOTE — PLAN OF CARE
Problem: Patient Care Overview (Adult)  Goal: Adult Individualization and Mutuality  Outcome: Ongoing (interventions implemented as appropriate)    Goal: Discharge Needs Assessment  Outcome: Ongoing (interventions implemented as appropriate)      Problem: Pain, Acute (Adult)  Goal: Acceptable Pain Control/Comfort Level  Outcome: Ongoing (interventions implemented as appropriate)

## 2017-12-21 NOTE — PROGRESS NOTES
Progress Note  Rehan Griffiths MD  Hospitalist     LOS: 0 days   Patient Care Team:  LALA Barajas as PCP - General (Nurse Practitioner)    Chief Complaint: dyspnea    Subjective     Interval History:     Patient Complaints: shortness of breath / chest pain, minimally better    History taken from: patient    Medication Review:   Current Facility-Administered Medications   Medication Dose Route Frequency Provider Last Rate Last Dose   • acetaminophen (TYLENOL) tablet 650 mg  650 mg Oral Q4H PRN LALA Larry   650 mg at 12/21/17 0410   • albuterol (PROVENTIL) nebulizer solution 0.083% 2.5 mg/3mL  1.25 mg Nebulization Q6H PRN LALA Larry       • amLODIPine (NORVASC) tablet 10 mg  10 mg Oral Nightly LALA Larry   10 mg at 12/20/17 2109   • atorvastatin (LIPITOR) tablet 20 mg  20 mg Oral Daily LALA Larry   20 mg at 12/21/17 0823   • clopidogrel (PLAVIX) tablet 75 mg  75 mg Oral Daily LALA Larry   75 mg at 12/21/17 0823   • dextrose (D50W) solution 25 g  25 g Intravenous Q15 Min PRN LALA Larry       • dextrose (GLUTOSE) oral gel 15 g  15 g Oral Q15 Min PRN LALA Larry       • glucagon (human recombinant) (GLUCAGEN DIAGNOSTIC) injection 1 mg  1 mg Subcutaneous Q15 Min PRN LALA Larry       • insulin aspart (novoLOG) injection 0-14 Units  0-14 Units Subcutaneous 4x Daily AC & at Bedtime LALA Larry   14 Units at 12/21/17 1102   • ipratropium-albuterol (DUO-NEB) nebulizer solution 3 mL  3 mL Nebulization 4x Daily - RT LALA Larry   3 mL at 12/21/17 1051   • isosorbide mononitrate (IMDUR) 24 hr tablet 120 mg  120 mg Oral Daily LALA Larry   120 mg at 12/21/17 0822   • lisinopril (PRINIVIL,ZESTRIL) tablet 40 mg  40 mg Oral QAM Artur Deni Gerdesmeier, APRN   40 mg at 12/21/17 0621   • metFORMIN (GLUCOPHAGE) tablet 1,000 mg   1,000 mg Oral BID With Meals Artur McfarlaneLALA Israel   1,000 mg at 12/21/17 0823   • morphine injection 3 mg  3 mg Intravenous Q3H PRN Connie Tapia MD   3 mg at 12/21/17 1022   • pantoprazole (PROTONIX) EC tablet 40 mg  40 mg Oral Nightly Artur Deni Santana APRN   40 mg at 12/20/17 2109   • ranolazine (RANEXA) 12 hr tablet 1,000 mg  1,000 mg Oral Q12H Artur LALA Borges   1,000 mg at 12/21/17 0823   • rivaroxaban (XARELTO) tablet 15 mg  15 mg Oral BID With Meals Artur LALA Borges   15 mg at 12/21/17 0823   • sertraline (ZOLOFT) tablet 25 mg  25 mg Oral Daily Artur LALA Borges   25 mg at 12/21/17 0823   • sodium chloride 0.9 % flush 1-10 mL  1-10 mL Intravenous PRN LALA Larry       • sodium chloride 0.9 % flush 10 mL  10 mL Intravenous PRN HENNA Candelario       • traZODone (DESYREL) tablet 300 mg  300 mg Oral Nightly Artur LALA Borges   300 mg at 12/20/17 2109       Review of Systems:   Review of Systems   Constitutional: Positive for fatigue. Negative for fever.   Respiratory: Positive for shortness of breath. Negative for wheezing and stridor.    Cardiovascular: Positive for chest pain. Negative for palpitations and leg swelling.   Gastrointestinal: Positive for abdominal distention. Negative for abdominal pain, nausea and vomiting.   Genitourinary: Negative for frequency and urgency.   Musculoskeletal: Positive for arthralgias, back pain and neck pain.   Skin: Positive for pallor.   Neurological: Positive for weakness. Negative for seizures and headaches.   Psychiatric/Behavioral: Negative for agitation, behavioral problems and confusion.   All other systems reviewed and are negative.      Objective     Vital Signs  Temp:  [96.7 °F (35.9 °C)-97.8 °F (36.6 °C)] 96.7 °F (35.9 °C)  Heart Rate:  [67-84] 76  Resp:  [18-24] 20  BP: (118-175)/(55-91) 124/58    Physical Exam:  Physical Exam   Constitutional: He is oriented to person,  place, and time. He appears distressed.   Morbidly obese   HENT:   Head: Normocephalic and atraumatic.   Eyes: EOM are normal. Pupils are equal, round, and reactive to light. No scleral icterus.   Neck: Normal range of motion. Neck supple.   Cardiovascular: Normal rate and regular rhythm.    Pulmonary/Chest: Breath sounds normal. He is in respiratory distress (minimal to moderate). He has no wheezes.   Abdominal: Soft. Bowel sounds are normal. He exhibits no distension. There is no tenderness.   Musculoskeletal: Normal range of motion. He exhibits edema (2+ / 3+ bilateral ankle edema). He exhibits no tenderness.   Neurological: He is alert and oriented to person, place, and time. He has normal reflexes. No cranial nerve deficit.   Skin: Skin is warm and dry. No erythema. No pallor.   Psychiatric: He has a normal mood and affect. His behavior is normal.   Vitals reviewed.       Results Review:    Lab Results (last 24 hours)     Procedure Component Value Units Date/Time    CBC & Differential [[id number]] Collected:  12/20/17 1331    Specimen:  Blood Updated:  12/20/17 1336    Narrative:       The following orders were created for panel order CBC & Differential.  Procedure                               Abnormality         Status                     ---------                               -----------         ------                     CBC Auto Differential[[id number]]        Abnormal            Final result                 Please view results for these tests on the individual orders.    CBC Auto Differential [[id number]]  (Abnormal) Collected:  12/20/17 1331    Specimen:  Blood Updated:  12/20/17 1336     WBC 7.76 10*3/mm3      RBC 4.74 10*6/mm3      Hemoglobin 13.1 (L) g/dL      Hematocrit 38.7 (L) %      MCV 81.6 fL      MCH 27.6 pg      MCHC 33.9 g/dL      RDW 14.5 %      RDW-SD 43.4 fl      MPV 9.2 fL      Platelets 160 10*3/mm3      Neutrophil % 68.0 %      Lymphocyte % 18.4 %      Monocyte % 8.4 %      Eosinophil %  3.7 %      Basophil % 0.1 %      Immature Grans % 1.4 (H) %      Neutrophils, Absolute 5.27 10*3/mm3      Lymphocytes, Absolute 1.43 10*3/mm3      Monocytes, Absolute 0.65 10*3/mm3      Eosinophils, Absolute 0.29 10*3/mm3      Basophils, Absolute 0.01 10*3/mm3      Immature Grans, Absolute 0.11 (H) 10*3/mm3     Protime-INR [[id number]]  (Normal) Collected:  12/20/17 1331    Specimen:  Blood Updated:  12/20/17 1353     Protime 14.2 Seconds      INR 1.11    Narrative:       Therapeutic range for most indications is 2.0-3.0 INR,  or 2.5-3.5 for mechanical heart valves.    aPTT [[id number]]  (Normal) Collected:  12/20/17 1331    Specimen:  Blood Updated:  12/20/17 1354     PTT 38.6 seconds     Narrative:       The recommended Heparin therapeutic range is 68-97 seconds.    D-dimer, Quantitative [[id number]]  (Normal) Collected:  12/20/17 1331    Specimen:  Blood Updated:  12/20/17 1355     D-Dimer, Quantitative <270 ng/mL (FEU)     Narrative:       Dimer values <500 ng/ml FEU are FDA approved as aid in diagnosis of deep venous thrombosis and pulmonary embolism.  This test should not be used in an exclusion strategy with pretest probability alone.    A recent guideline regarding diagnosis for pulmonary thomboembolism recommends an adjusted exclusion criterion of age x 10 ng/ml FEU for patients >50 years of age ([employer] Intern Med 2015; 163: 701-711).    Comprehensive Metabolic Panel [[id number]]  (Abnormal) Collected:  12/20/17 1357    Specimen:  Blood Updated:  12/20/17 1412     Glucose 328 (H) mg/dL      BUN 13 mg/dL      Creatinine 0.91 mg/dL      Sodium 135 (L) mmol/L      Potassium 4.3 mmol/L      Chloride 96 mmol/L      CO2 28.0 mmol/L      Calcium 8.8 mg/dL      Total Protein 7.8 g/dL      Albumin 4.00 g/dL      ALT (SGPT) 31 U/L      AST (SGOT) 25 U/L      Alkaline Phosphatase 81 U/L      Total Bilirubin 0.4 mg/dL      eGFR Non African Amer 93 mL/min/1.73      Globulin 3.8 (H) gm/dL      A/G Ratio 1.1 g/dL       BUN/Creatinine Ratio 14.3     Anion Gap 11.0 mmol/L     CK-MB [[id number]]  (Normal) Collected:  12/20/17 1357    Specimen:  Blood Updated:  12/20/17 1425     CKMB 2.33 ng/mL     Troponin [[id number]]  (Normal) Collected:  12/20/17 1357    Specimen:  Blood Updated:  12/20/17 1425     Troponin I <0.012 ng/mL     Blood Gas, Arterial [[id number]]  (Abnormal) Collected:  12/20/17 1439    Specimen:  Arterial Blood Updated:  12/20/17 1441     Site --      Not performed at this site.        Cory's Test --      Not performed at this site.        pH, Arterial 7.356 pH units      pCO2, Arterial 48.3 (H) mm Hg      pO2, Arterial 89.4 mm Hg      HCO3, Arterial 26.4 (H) mmol/L      Base Excess, Arterial 0.3 mmol/L      O2 Saturation, Arterial 96.1 %      Hemoglobin, Blood Gas 13.9 (L) g/dL      Hematocrit, Blood Gas 41.0 %      CO2 Content 27.9 (H)     Sodium, Arterial 133.2 (L) mmol/L      Potassium, Arterial 4.27 mmol/L      Glucose, Arterial 352 mmol/L      Barometric Pressure for Blood Gas -- mmHg       Not performed at this site.        Modality --      Not performed at this site.        Ionized Calcium 4.70 mg/dL     Extra Tubes [[id number]] Collected:  12/20/17 1357    Specimen:  Blood from Blood, Venous Line Updated:  12/20/17 1501    Narrative:       The following orders were created for panel order Extra Tubes.  Procedure                               Abnormality         Status                     ---------                               -----------         ------                     Light Blue Top[[id number]]                                   Final result               Gold Top - SST[[id number]]                                   Final result                 Please view results for these tests on the individual orders.    Light Blue Top [[id number]] Collected:  12/20/17 1357    Specimen:  Blood Updated:  12/20/17 1501     Extra Tube hold for add-on      Auto resulted       Gold Top - SST [[id number]] Collected:  12/20/17 1357     Specimen:  Blood Updated:  12/20/17 1501     Extra Tube Hold for add-ons.      Auto resulted.       CBC & Differential [[id number]] Collected:  12/20/17 1942    Specimen:  Blood Updated:  12/20/17 2006    Narrative:       The following orders were created for panel order CBC & Differential.  Procedure                               Abnormality         Status                     ---------                               -----------         ------                     CBC Auto Differential[[id number]]        Abnormal            Final result                 Please view results for these tests on the individual orders.    CBC Auto Differential [[id number]]  (Abnormal) Collected:  12/20/17 1942    Specimen:  Blood Updated:  12/20/17 2006     WBC 7.21 10*3/mm3      RBC 4.80 10*6/mm3      Hemoglobin 13.3 (L) g/dL      Hematocrit 38.8 (L) %      MCV 80.8 fL      MCH 27.7 pg      MCHC 34.3 g/dL      RDW 14.5 %      RDW-SD 42.6 fl      MPV 9.0 fL      Platelets 160 10*3/mm3      Neutrophil % 61.1 %      Lymphocyte % 22.7 %      Monocyte % 9.8 %      Eosinophil % 3.9 %      Basophil % 0.3 %      Immature Grans % 2.2 (H) %      Neutrophils, Absolute 4.40 10*3/mm3      Lymphocytes, Absolute 1.64 10*3/mm3      Monocytes, Absolute 0.71 10*3/mm3      Eosinophils, Absolute 0.28 10*3/mm3      Basophils, Absolute 0.02 10*3/mm3      Immature Grans, Absolute 0.16 (H) 10*3/mm3     Basic Metabolic Panel [[id number]]  (Abnormal) Collected:  12/20/17 1942    Specimen:  Blood Updated:  12/20/17 2020     Glucose 220 (H) mg/dL      BUN 12 mg/dL      Creatinine 0.73 mg/dL      Sodium 131 (L) mmol/L      Potassium 4.0 mmol/L      Chloride 96 mmol/L      CO2 27.0 mmol/L      Calcium 8.9 mg/dL      eGFR Non African Amer 120 mL/min/1.73      BUN/Creatinine Ratio 16.4     Anion Gap 8.0 mmol/L     Troponin [[id number]]  (Normal) Collected:  12/20/17 1942    Specimen:  Blood Updated:  12/20/17 2032     Troponin I <0.012 ng/mL     Extra Tubes [[id number]]  Collected:  12/20/17 1942    Specimen:  Blood from Blood, Venous Line Updated:  12/20/17 2046    Narrative:       The following orders were created for panel order Extra Tubes.  Procedure                               Abnormality         Status                     ---------                               -----------         ------                     Light Blue Top[[id number]]                                   Final result                 Please view results for these tests on the individual orders.    Light Blue Top [[id number]] Collected:  12/20/17 1942    Specimen:  Blood Updated:  12/20/17 2046     Extra Tube hold for add-on      Auto resulted       POC Glucose Once [[id number]]  (Abnormal) Collected:  12/20/17 2115    Specimen:  Blood Updated:  12/20/17 2136     Glucose 308 (H) mg/dL       RN NotifiedMeter: [id number]: [id number] ROSARIO PRASAD       CBC & Differential [[id number]] Collected:  12/21/17 0530    Specimen:  Blood Updated:  12/21/17 0641    Narrative:       The following orders were created for panel order CBC & Differential.  Procedure                               Abnormality         Status                     ---------                               -----------         ------                     CBC Auto Differential[[id number]]        Abnormal            Final result                 Please view results for these tests on the individual orders.    CBC Auto Differential [[id number]]  (Abnormal) Collected:  12/21/17 0530    Specimen:  Blood Updated:  12/21/17 0641     WBC 7.51 10*3/mm3      RBC 4.66 10*6/mm3      Hemoglobin 12.7 (L) g/dL      Hematocrit 38.0 (L) %      MCV 81.5 fL      MCH 27.3 pg      MCHC 33.4 g/dL      RDW 14.6 (H) %      RDW-SD 42.9 fl      MPV 9.4 fL      Platelets 173 10*3/mm3      Neutrophil % 63.8 %      Lymphocyte % 22.1 %      Monocyte % 8.5 %      Eosinophil % 3.1 %      Basophil % 0.1 %      Immature Grans % 2.4 (H) %      Neutrophils, Absolute 4.79 10*3/mm3       Lymphocytes, Absolute 1.66 10*3/mm3      Monocytes, Absolute 0.64 10*3/mm3      Eosinophils, Absolute 0.23 10*3/mm3      Basophils, Absolute 0.01 10*3/mm3      Immature Grans, Absolute 0.18 (H) 10*3/mm3     Basic Metabolic Panel [[id number]]  (Abnormal) Collected:  12/21/17 0530    Specimen:  Blood Updated:  12/21/17 0705     Glucose 227 (H) mg/dL      BUN 12 mg/dL      Creatinine 0.72 mg/dL      Sodium 132 (L) mmol/L      Potassium 3.6 mmol/L      Chloride 97 mmol/L      CO2 27.0 mmol/L      Calcium 8.6 mg/dL      eGFR Non African Amer 122 mL/min/1.73      BUN/Creatinine Ratio 16.7     Anion Gap 8.0 mmol/L     POC Glucose Once [[id number]]  (Abnormal) Collected:  12/21/17 0724    Specimen:  Blood Updated:  12/21/17 0745     Glucose 238 (H) mg/dL       RN NotifiedMeter: [id number]: [id number] FIDEL HERNANDEZ             Imaging Results (last 24 hours)     Procedure Component Value Units Date/Time    XR Chest 1 View [[id number]] Collected:  12/20/17 1311     Updated:  12/20/17 1336    Narrative:         PORTABLE CHEST    HISTORY: Chest pain    Portable AP upright film of the chest was obtained at 12:43 PM.  COMPARISON: December 12, 2017    EKG leads.  The lungs are clear of an acute process.  Stable minimal cardiomegaly.  The pulmonary vasculature is not increased.  No pleural effusion.  No pneumothorax.  No acute osseous abnormality.      Impression:       CONCLUSION:  No Acute Disease.  Stable minimal cardiomegaly.    [id number]    Electronically signed by:  Kulwant Currie MD  12/20/2017 1:35 PM  [employer] Workstation: [id number]          Assessment/Plan     Principal Problem:    Shortness of breath  Active Problems:    Morbid obesity    Chronic pulmonary embolism    Obstructive sleep apnea syndrome    Hypertension    Chronic back pain    Type 2 diabetes mellitus    Continue anticoagulation / BiPAP, nebulized treatments.    Rehan Griffiths MD  12/21/17  11:49 AM

## 2017-12-21 NOTE — PLAN OF CARE
Problem: Patient Care Overview (Adult)  Goal: Plan of Care Review  Outcome: Ongoing (interventions implemented as appropriate)   12/21/17 0110   Coping/Psychosocial Response Interventions   Plan Of Care Reviewed With patient   Patient Care Overview   Progress no change   Outcome Evaluation   Outcome Summary/Follow up Plan New admit.     Goal: Adult Individualization and Mutuality  Outcome: Ongoing (interventions implemented as appropriate)    Goal: Discharge Needs Assessment  Outcome: Ongoing (interventions implemented as appropriate)      Problem: Pain, Acute (Adult)  Goal: Identify Related Risk Factors and Signs and Symptoms  Outcome: Outcome(s) achieved Date Met: 12/21/17    Goal: Acceptable Pain Control/Comfort Level  Outcome: Ongoing (interventions implemented as appropriate)

## 2017-12-22 VITALS
OXYGEN SATURATION: 98 % | TEMPERATURE: 97.8 F | HEART RATE: 83 BPM | DIASTOLIC BLOOD PRESSURE: 75 MMHG | WEIGHT: 315 LBS | RESPIRATION RATE: 18 BRPM | BODY MASS INDEX: 44.1 KG/M2 | HEIGHT: 71 IN | SYSTOLIC BLOOD PRESSURE: 156 MMHG

## 2017-12-22 PROBLEM — R06.02 SHORTNESS OF BREATH: Chronic | Status: ACTIVE | Noted: 2017-12-20

## 2017-12-22 LAB
ANION GAP SERPL CALCULATED.3IONS-SCNC: 12 MMOL/L (ref 5–15)
BASOPHILS # BLD AUTO: 0.02 10*3/MM3 (ref 0–0.2)
BASOPHILS NFR BLD AUTO: 0.3 % (ref 0–2)
BUN BLD-MCNC: 11 MG/DL (ref 7–21)
BUN/CREAT SERPL: 16.4 (ref 7–25)
CALCIUM SPEC-SCNC: 8.7 MG/DL (ref 8.4–10.2)
CHLORIDE SERPL-SCNC: 96 MMOL/L (ref 95–110)
CO2 SERPL-SCNC: 27 MMOL/L (ref 22–31)
CREAT BLD-MCNC: 0.67 MG/DL (ref 0.7–1.3)
DEPRECATED RDW RBC AUTO: 43.1 FL (ref 35.1–43.9)
EOSINOPHIL # BLD AUTO: 0.28 10*3/MM3 (ref 0–0.7)
EOSINOPHIL NFR BLD AUTO: 3.8 % (ref 0–7)
ERYTHROCYTE [DISTWIDTH] IN BLOOD BY AUTOMATED COUNT: 14.4 % (ref 11.5–14.5)
GFR SERPL CREATININE-BSD FRML MDRD: 132 ML/MIN/1.73 (ref 60–162)
GLUCOSE BLD-MCNC: 249 MG/DL (ref 60–100)
GLUCOSE BLDC GLUCOMTR-MCNC: 261 MG/DL (ref 70–130)
GLUCOSE BLDC GLUCOMTR-MCNC: 325 MG/DL (ref 70–130)
GLUCOSE BLDC GLUCOMTR-MCNC: 413 MG/DL (ref 70–130)
HCT VFR BLD AUTO: 40 % (ref 39–49)
HGB BLD-MCNC: 13.3 G/DL (ref 13.7–17.3)
IMM GRANULOCYTES # BLD: 0.07 10*3/MM3 (ref 0–0.02)
IMM GRANULOCYTES NFR BLD: 0.9 % (ref 0–0.5)
LYMPHOCYTES # BLD AUTO: 1.46 10*3/MM3 (ref 0.6–4.2)
LYMPHOCYTES NFR BLD AUTO: 19.8 % (ref 10–50)
MCH RBC QN AUTO: 27.3 PG (ref 26.5–34)
MCHC RBC AUTO-ENTMCNC: 33.3 G/DL (ref 31.5–36.3)
MCV RBC AUTO: 82.1 FL (ref 80–98)
MONOCYTES # BLD AUTO: 0.51 10*3/MM3 (ref 0–0.9)
MONOCYTES NFR BLD AUTO: 6.9 % (ref 0–12)
NEUTROPHILS # BLD AUTO: 5.05 10*3/MM3 (ref 2–8.6)
NEUTROPHILS NFR BLD AUTO: 68.3 % (ref 37–80)
NRBC BLD MANUAL-RTO: 0 /100 WBC (ref 0–0)
PLATELET # BLD AUTO: 154 10*3/MM3 (ref 150–450)
PMV BLD AUTO: 8.8 FL (ref 8–12)
POTASSIUM BLD-SCNC: 4.1 MMOL/L (ref 3.5–5.1)
RBC # BLD AUTO: 4.87 10*6/MM3 (ref 4.37–5.74)
SODIUM BLD-SCNC: 135 MMOL/L (ref 137–145)
WBC NRBC COR # BLD: 7.39 10*3/MM3 (ref 3.2–9.8)

## 2017-12-22 PROCEDURE — 82962 GLUCOSE BLOOD TEST: CPT

## 2017-12-22 PROCEDURE — 80048 BASIC METABOLIC PNL TOTAL CA: CPT | Performed by: NURSE PRACTITIONER

## 2017-12-22 PROCEDURE — 85025 COMPLETE CBC W/AUTO DIFF WBC: CPT | Performed by: NURSE PRACTITIONER

## 2017-12-22 PROCEDURE — 94760 N-INVAS EAR/PLS OXIMETRY 1: CPT

## 2017-12-22 PROCEDURE — 94799 UNLISTED PULMONARY SVC/PX: CPT

## 2017-12-22 PROCEDURE — 96376 TX/PRO/DX INJ SAME DRUG ADON: CPT

## 2017-12-22 PROCEDURE — 63710000001 INSULIN ASPART PER 5 UNITS: Performed by: NURSE PRACTITIONER

## 2017-12-22 PROCEDURE — G0378 HOSPITAL OBSERVATION PER HR: HCPCS

## 2017-12-22 PROCEDURE — 25010000002 MORPHINE PER 10 MG: Performed by: INTERNAL MEDICINE

## 2017-12-22 RX ORDER — ACETAMINOPHEN 325 MG/1
650 TABLET ORAL EVERY 4 HOURS PRN
Start: 2017-12-22 | End: 2018-01-15

## 2017-12-22 RX ADMIN — LISINOPRIL 40 MG: 40 TABLET ORAL at 06:00

## 2017-12-22 RX ADMIN — SERTRALINE HYDROCHLORIDE 25 MG: 25 TABLET ORAL at 08:03

## 2017-12-22 RX ADMIN — MORPHINE SULFATE 3 MG: 8 INJECTION, SOLUTION INTRAMUSCULAR; INTRAVENOUS at 04:03

## 2017-12-22 RX ADMIN — RIVAROXABAN 15 MG: 15 TABLET, FILM COATED ORAL at 08:04

## 2017-12-22 RX ADMIN — METFORMIN HYDROCHLORIDE 1000 MG: 500 TABLET ORAL at 08:03

## 2017-12-22 RX ADMIN — RANOLAZINE 1000 MG: 500 TABLET, FILM COATED, EXTENDED RELEASE ORAL at 08:03

## 2017-12-22 RX ADMIN — IPRATROPIUM BROMIDE AND ALBUTEROL SULFATE 3 ML: 2.5; .5 SOLUTION RESPIRATORY (INHALATION) at 06:45

## 2017-12-22 RX ADMIN — INSULIN ASPART 10 UNITS: 100 INJECTION, SOLUTION INTRAVENOUS; SUBCUTANEOUS at 08:02

## 2017-12-22 RX ADMIN — ISOSORBIDE MONONITRATE 120 MG: 60 TABLET, EXTENDED RELEASE ORAL at 08:03

## 2017-12-22 RX ADMIN — ATORVASTATIN CALCIUM 20 MG: 20 TABLET, FILM COATED ORAL at 08:03

## 2017-12-22 RX ADMIN — CLOPIDOGREL BISULFATE 75 MG: 75 TABLET ORAL at 08:03

## 2017-12-22 NOTE — DISCHARGE INSTRUCTIONS
Pulmonary Embolism  A pulmonary embolism (PE) is a sudden blockage or decrease of blood flow in one lung or both lungs. Most blockages come from a blood clot that travels from the legs or the pelvis to the lungs. PE is a dangerous and potentially life-threatening condition if it is not treated right away.  CAUSES  A pulmonary embolism occurs most commonly when a blood clot travels from one of your veins to your lungs. Rarely, PE is caused by air, fat, amniotic fluid, or part of a tumor traveling through your veins to your lungs.  RISK FACTORS  A PE is more likely to develop in:  · People who smoke.  · People who are older, especially over 60 years of age.  · People who are overweight (obese).  · People who sit or lie still for a long time, such as during long-distance travel (over 4 hours), bed rest, hospitalization, or during recovery from certain medical conditions like a stroke.  · People who do not engage in much physical activity (sedentary lifestyle).  · People who have chronic breathing disorders.  · People who have a personal or family history of blood clots or blood clotting disease.  · People who have peripheral vascular disease (PVD), diabetes, or some types of cancer.  · People who have heart disease, especially if the person had a recent heart attack or has congestive heart failure.  · People who have neurological diseases that affect the legs (leg paresis).  · People who have had a traumatic injury, such as breaking a hip or leg.  · People who have recently had major or lengthy surgery, especially on the hip, knee, or abdomen.  · People who have had a central line placed inside a large vein.  · People who take medicines that contain the hormone estrogen. These include birth control pills and hormone replacement therapy.  · Pregnancy or during childbirth or the postpartum period.  SIGNS AND SYMPTOMS   The symptoms of a PE usually start suddenly and include:  · Shortness of breath while active or at  rest.  · Coughing or coughing up blood or blood-tinged mucus.  · Chest pain that is often worse with deep breaths.  · Rapid or irregular heartbeat.  · Feeling light-headed or dizzy.  · Fainting.  · Feeling anxious.  · Sweating.  There may also be pain and swelling in a leg if that is where the blood clot started.  These symptoms may represent a serious problem that is an emergency. Do not wait to see if the symptoms will go away. Get medical help right away. Call your local emergency services (911 in the [place] Do not drive yourself to the hospital.  DIAGNOSIS  Your health care provider will take a medical history and perform a physical exam. You may also have other tests, including:  · Blood tests to assess the clotting properties of your blood, assess oxygen levels in your blood, and find blood clots.  · Imaging tests, such as CT, ultrasound, MRI, X-ray, and other tests to see if you have clots anywhere in your body.  · An electrocardiogram (ECG) to look for heart strain from blood clots in the lungs.  TREATMENT  The main goals of PE treatment are:  · To stop a blood clot from growing larger.  · To stop new blood clots from forming.  The type of treatment that you receive depends on many factors, such as the cause of your PE, your risk for bleeding or developing more clots, and other medical conditions that you have. Sometimes, a combination of treatments is necessary.  This condition may be treated with:  · Medicines, including newer oral blood thinners (anticoagulants), warfarin, low molecular weight heparins, thrombolytics, or heparins.  · Wearing compression stockings or using different types of devices.  · Surgery (rare) to remove the blood clot or to place a filter in your abdomen to stop the blood clot from traveling to your lungs.  Treatments for a PE are often divided into immediate treatment, long-term treatment (up to 3 months after PE), and extended treatment (more than 3 months after PE). Your  treatment may continue for several months. This is called maintenance therapy, and it is used to prevent the forming of new blood clots. You can work with your health care provider to choose the treatment program that is best for you.  What are anticoagulants?   Anticoagulants are medicines that treat PEs. They can stop current blood clots from growing and stop new clots from forming. They cannot dissolve existing clots. Your body dissolves clots by itself over time. Anticoagulants are given by mouth, by injection, or through an IV tube.  What are thrombolytics?   Thrombolytics are clot-dissolving medicines that are used to dissolve a PE. They carry a high risk of bleeding, so they tend to be used only in severe cases or if you have very low blood pressure.  HOME CARE INSTRUCTIONS  If you are taking a newer oral anticoagulant:   · Take the medicine every single day at the same time each day.  · Understand what foods and drugs interact with this medicine.  · Understand that there are no regular blood tests required when using this medicine.  · Understand the side effects of this medicine, including excessive bruising or bleeding. Ask your health care provider or pharmacist about other possible side effects.  If you are taking warfarin:  · Understand how to take warfarin and know which foods can affect how warfarin works in your body.  · Understand that it is dangerous to take too much or too little warfarin. Too much warfarin increases the risk of bleeding. Too little warfarin continues to allow the risk for blood clots.  · Follow your PT and INR blood testing schedule. The PT and INR results allow your health care provider to adjust your dose of warfarin. It is very important that you have your PT and INR tested as often as told by your health care provider.  · Avoid major changes in your diet, or tell your health care provider before you change your diet. Arrange a visit with a registered dietitian to answer your  questions. Many foods, especially foods that are high in vitamin K, can interfere with warfarin and affect the PT and INR results. Eat a consistent amount of foods that are high in vitamin K, such as:    Spinach, kale, broccoli, cabbage, karly greens, turnip greens, [place] sprouts, peas, cauliflower, seaweed, and parsley.    Beef liver and pork liver.    Green tea.    Soybean oil.  · Tell your health care provider about any and all medicines, vitamins, and supplements that you take, including aspirin and other over-the-counter anti-inflammatory medicines. Be especially cautious with aspirin and anti-inflammatory medicines. Do not take those before you ask your health care provider if it is safe to do so.  This is important because many medicines can interfere with warfarin and affect the PT and INR results.  · Do not start or stop taking any over-the-counter or prescription medicine unless your health care provider or pharmacist tells you to do so.  If you take warfarin, you will also need to do these things:  · Hold pressure over cuts for longer than usual.  · Tell your dentist and other health care providers that you are taking warfarin before you have any procedures in which bleeding may occur.  · Avoid alcohol or drink very small amounts. Tell your health care provider if you change your alcohol intake.  · Do not use tobacco products, including cigarettes, chewing tobacco, and e-cigarettes. If you need help quitting, ask your health care provider.  · Avoid contact sports.  General Instructions  · Take over-the-counter and prescription medicines only as told by your health care provider. Anticoagulant medicines can have side effects, including easy bruising and difficulty stopping bleeding. If you are prescribed an anticoagulant, you will also need to do these things:    Hold pressure over cuts for longer than usual.    Tell your dentist and other health care providers that you are taking anticoagulants  before you have any procedures in which bleeding may occur.    Avoid contact sports.  · Wear a medical alert bracelet or carry a medical alert card that says you have had a PE.  · Ask your health care provider how soon you can go back to your normal activities. Stay active to prevent new blood clots from forming.  · Make sure to exercise while traveling or when you have been sitting or standing for a long period of time. It is very important to exercise. Exercise your legs by walking or by tightening and relaxing your leg muscles often. Take frequent walks.  · Wear compression stockings as told by your health care provider to help prevent more blood clots from forming.  · Do not use tobacco products, including cigarettes, chewing tobacco, and e-cigarettes. If you need help quitting, ask your health care provider.  · Keep all follow-up appointments with your health care provider. This is important.  PREVENTION  Take these actions to decrease your risk of developing another PE:  · Exercise regularly. For at least 30 minutes every day, engage in:    Activity that involves moving your arms and legs.    Activity that encourages good blood flow through your body by increasing your heart rate.  · Exercise your arms and legs every hour during long-distance travel (over 4 hours). Drink plenty of water and avoid drinking alcohol while traveling.  · Avoid sitting or lying in bed for long periods of time without moving your legs.  · Maintain a weight that is appropriate for your height. Ask your health care provider what weight is healthy for you.  · If you are a woman who is over 35 years of age, avoid unnecessary use of medicines that contain estrogen. These include birth control pills.  · Do not smoke, especially if you take estrogen medicines.  If you need help quitting, ask your health care provider.  · If you are at very high risk for PE, wear compression stockings.  · If you recently had a PE, have regularly scheduled  ultrasound testing on your legs to check for new blood clots.  If you are hospitalized, prevention measures may include:  · Early walking after surgery, as soon as your health care provider says that it is safe.  · Receiving anticoagulants to prevent blood clots. If you cannot take anticoagulants, other options may be available, such as wearing compression stockings or using different types of devices.  SEEK IMMEDIATE MEDICAL CARE IF:  · You have new or increased pain, swelling, or redness in an arm or leg.  · You have numbness or tingling in an arm or leg.  · You have shortness of breath while active or at rest.  · You have chest pain.  · You have a rapid or irregular heartbeat.  · You feel light-headed or dizzy.  · You cough up blood.  · You notice blood in your vomit, bowel movement, or urine.  · You have a fever.  These symptoms may represent a serious problem that is an emergency. Do not wait to see if the symptoms will go away. Get medical help right away. Call your local emergency services (911 in the [place] Do not drive yourself to the hospital.     This information is not intended to replace advice given to you by your health care provider. Make sure you discuss any questions you have with your health care provider.     Document Released: 12/15/2001 Document Revised: 04/10/2017 Document Reviewed: 04/13/2016  [employer] Interactive Patient Education ©2017 [employer]

## 2017-12-22 NOTE — PLAN OF CARE
Problem: Pain, Acute (Adult)  Goal: Acceptable Pain Control/Comfort Level  Outcome: Ongoing (interventions implemented as appropriate)

## 2017-12-22 NOTE — DISCHARGE SUMMARY
Discharge Summary  Rehan Griffiths MD  Hospitalist     Date of Discharge:  12/22/2017    Discharge Diagnosis:    Principal Problem:    Shortness of breath  Active Problems:    Morbid obesity    Chronic pulmonary embolism    Obstructive sleep apnea syndrome    Hypertension    Chronic back pain    Diabetes mellitus      Presenting Problem/History of Present Illness  Dyspnea - chronic    Hospital Course  Patient is a 37 y.o. male admitted again for the chronic dyspnea, chronic chest pain, symptoms for which he is admitted on a regular basis. His repeat lung CT angiogram showed the chronic pulmonary embolism for which he is currently treated, his ECG showed no new changes and his Troponin values were within normal limit. As his condition is stable and not any different than the one on admission he is discharged home on his nasal CPAP machine, the Xarelto at the current dose and all his other medications as listed.    The day of discharge he is ambulating around the hospital [place] and his O2 sats remain at 90 to 92 % on room air.    Consults:   Consults     Date and Time Order Name Status Description    12/20/2017 1642 Hospitalist (on-call MD unless specified)      12/12/2017 4617 Inpatient Consult to Cardiothoracic Surgery Completed           Pertinent Test Results: Lung CTA shows chronic bilateral pulmnary embolism.    Condition on Discharge:  stable    Vital Signs  Temp:  [96.6 °F (35.9 °C)-99 °F (37.2 °C)] 98.7 °F (37.1 °C)  Heart Rate:  [] 102  Resp:  [18-22] 21  BP: (129-150)/(59-87) 139/62    Physical Exam:  Physical Exam   Constitutional: He is oriented to person, place, and time. No distress.   Extreme morbid obesity   HENT:   Head: Normocephalic and atraumatic.   Eyes: EOM are normal. Pupils are equal, round, and reactive to light. No scleral icterus.   Neck: Normal range of motion. Neck supple.   Cardiovascular: Normal rate and regular rhythm.    No murmur heard.  Pulmonary/Chest: Effort normal and breath  sounds normal. No respiratory distress. He has no wheezes.   Abdominal: Soft. Bowel sounds are normal. He exhibits distension. There is no tenderness. There is no rebound and no guarding.   Musculoskeletal: Normal range of motion. He exhibits edema (2+ ankle edema). He exhibits no tenderness.   Neurological: He is alert and oriented to person, place, and time. He has normal reflexes. No cranial nerve deficit.   Skin: Skin is warm and dry. No rash noted.   Psychiatric: He has a normal mood and affect. His behavior is normal.   Vitals reviewed.      Discharge Disposition  Home or Self Care    Discharge Medications   Yordy Hansen   Home Medication Instructions ROCKY:[id number]    Printed on:12/22/17 1120   Medication Information                      acetaminophen (TYLENOL) 325 MG tablet  Take 2 tablets by mouth Every 4 (Four) Hours As Needed for Mild Pain .             albuterol (ACCUNEB) 1.25 MG/3ML nebulizer solution  Take 3 mL by nebulization Every 6 (Six) Hours As Needed for Wheezing.             albuterol (PROVENTIL HFA;VENTOLIN HFA) 108 (90 BASE) MCG/ACT inhaler  Inhale 2 puffs Every 4 (Four) Hours As Needed for Wheezing.             amLODIPine (NORVASC) 10 MG tablet  Take 1 tablet by mouth Every Night.             Canagliflozin 100 MG tablet  Take 1 tablet by mouth Every Morning Before Breakfast.             clopidogrel (PLAVIX) 75 MG tablet  Take 1 tablet by mouth Daily.             ipratropium-albuterol (DUO-NEB) 0.5-2.5 mg/mL nebulizer  EVERY FOUR HOURS as needed for SHORTNESS OF BREATH             isosorbide mononitrate (IMDUR) 120 MG 24 hr tablet  Take 120 mg by mouth Daily.             lisinopril (PRINIVIL,ZESTRIL) 40 MG tablet  Take 1 tablet by mouth Every Morning.             metFORMIN (GLUCOPHAGE) 1000 MG tablet  TWICE A DAY             metoprolol succinate XL (TOPROL-XL) 200 MG 24 hr tablet  Take 200 mg by mouth.             MICROLET LANCETS misc  USE TO TEST BLOOD SUGAR EVERY DAY AND AS NEEDED              nitroglycerin (NITROSTAT) 0.4 MG SL tablet  Place 1 tablet under the tongue Every 5 (Five) Minutes As Needed for Chest Pain. Take no more than 3 doses in 15 minutes.             pantoprazole (PROTONIX) 40 MG EC tablet  Take 1 tablet by mouth Every Night.             pramipexole (MIRAPEX) 1 MG tablet  Take 2 tablets by mouth Every Night. Taking 2mg daily             pravastatin (PRAVACHOL) 80 MG tablet  Take 1 tablet by mouth Every Night.             ranolazine (RANEXA) 1000 MG 12 hr tablet  Take 1 tablet by mouth Every 12 (Twelve) Hours.             rivaroxaban (XARELTO) 15 MG tablet  Take 1 tablet by mouth 2 (Two) Times a Day With Meals.             sertraline (ZOLOFT) 25 MG tablet  Take 1 tablet by mouth Daily.             traZODone (DESYREL) 300 MG tablet  Take 1 tablet by mouth Every Night.                 Discharge Diet:   Diet Instructions     Diet: Regular, Consistent Carbohydrate       Discharge Diet:   Regular  Consistent Carbohydrate      [address] ADA               Diabetic consistent carbohydrates, heart healthy.                   Activity at Discharge:   Activity Instructions     Activity as Tolerated               Additional Activity Instructions:      As tolerated                  Follow-up Appointments  Future Appointments  Date Time Provider Department Center   1/4/2018 2:00 PM Antony Jenkins MD PhD [facility] None   1/16/2018 11:20 AM LALA Valencia [facility] None     Additional Instructions for the Follow-ups that You Need to Schedule     Discharge Follow-up with PCP    As directed    Follow Up Details:  PCP in 1 week                        Rehan Griffiths MD  12/22/17  11:20 AM

## 2017-12-22 NOTE — PLAN OF CARE
Problem: Patient Care Overview (Adult)  Goal: Plan of Care Review  Outcome: Ongoing (interventions implemented as appropriate)   12/22/17 0337   Coping/Psychosocial Response Interventions   Plan Of Care Reviewed With patient   Patient Care Overview   Progress no change   Outcome Evaluation   Outcome Summary/Follow up Plan VSS, pt resting well throughout the night, wearing cpap while sleeping     Goal: Adult Individualization and Mutuality  Outcome: Ongoing (interventions implemented as appropriate)    Goal: Discharge Needs Assessment  Outcome: Ongoing (interventions implemented as appropriate)      Problem: Pain, Acute (Adult)  Goal: Acceptable Pain Control/Comfort Level  Outcome: Ongoing (interventions implemented as appropriate)

## 2018-01-01 ENCOUNTER — APPOINTMENT (OUTPATIENT)
Dept: CT IMAGING | Facility: HOSPITAL | Age: 38
End: 2018-01-01

## 2018-01-01 ENCOUNTER — HOSPITAL ENCOUNTER (EMERGENCY)
Facility: HOSPITAL | Age: 38
Discharge: HOME | End: 2018-01-02
Attending: EMERGENCY MEDICINE | Admitting: EMERGENCY MEDICINE

## 2018-01-01 DIAGNOSIS — R07.9 CHEST PAIN, UNSPECIFIED TYPE: Primary | ICD-10-CM

## 2018-01-01 DIAGNOSIS — R06.02 SHORTNESS OF BREATH: ICD-10-CM

## 2018-01-01 LAB
BASOPHILS # BLD AUTO: 0.01 10*3/MM3 (ref 0–0.2)
BASOPHILS NFR BLD AUTO: 0.2 % (ref 0–2)
DEPRECATED RDW RBC AUTO: 41.9 FL (ref 35.1–43.9)
EOSINOPHIL # BLD AUTO: 0.24 10*3/MM3 (ref 0–0.7)
EOSINOPHIL NFR BLD AUTO: 3.9 % (ref 0–7)
ERYTHROCYTE [DISTWIDTH] IN BLOOD BY AUTOMATED COUNT: 14 % (ref 11.5–14.5)
HCT VFR BLD AUTO: 38.7 % (ref 39–49)
HGB BLD-MCNC: 13 G/DL (ref 13.7–17.3)
IMM GRANULOCYTES # BLD: 0.11 10*3/MM3 (ref 0–0.02)
IMM GRANULOCYTES NFR BLD: 1.8 % (ref 0–0.5)
LYMPHOCYTES # BLD AUTO: 1.59 10*3/MM3 (ref 0.6–4.2)
LYMPHOCYTES NFR BLD AUTO: 25.9 % (ref 10–50)
MCH RBC QN AUTO: 27.3 PG (ref 26.5–34)
MCHC RBC AUTO-ENTMCNC: 33.6 G/DL (ref 31.5–36.3)
MCV RBC AUTO: 81.3 FL (ref 80–98)
MONOCYTES # BLD AUTO: 0.72 10*3/MM3 (ref 0–0.9)
MONOCYTES NFR BLD AUTO: 11.7 % (ref 0–12)
NEUTROPHILS # BLD AUTO: 3.48 10*3/MM3 (ref 2–8.6)
NEUTROPHILS NFR BLD AUTO: 56.5 % (ref 37–80)
PLATELET # BLD AUTO: 158 10*3/MM3 (ref 150–450)
PMV BLD AUTO: 8.7 FL (ref 8–12)
RBC # BLD AUTO: 4.76 10*6/MM3 (ref 4.37–5.74)
WBC NRBC COR # BLD: 6.15 10*3/MM3 (ref 3.2–9.8)

## 2018-01-01 PROCEDURE — 85610 PROTHROMBIN TIME: CPT | Performed by: EMERGENCY MEDICINE

## 2018-01-01 PROCEDURE — 99283 EMERGENCY DEPT VISIT LOW MDM: CPT

## 2018-01-01 PROCEDURE — 93005 ELECTROCARDIOGRAM TRACING: CPT | Performed by: EMERGENCY MEDICINE

## 2018-01-01 PROCEDURE — 85730 THROMBOPLASTIN TIME PARTIAL: CPT | Performed by: EMERGENCY MEDICINE

## 2018-01-01 PROCEDURE — 93010 ELECTROCARDIOGRAM REPORT: CPT | Performed by: INTERNAL MEDICINE

## 2018-01-01 PROCEDURE — 85025 COMPLETE CBC W/AUTO DIFF WBC: CPT | Performed by: EMERGENCY MEDICINE

## 2018-01-01 PROCEDURE — 80053 COMPREHEN METABOLIC PANEL: CPT | Performed by: EMERGENCY MEDICINE

## 2018-01-01 PROCEDURE — 84484 ASSAY OF TROPONIN QUANT: CPT | Performed by: EMERGENCY MEDICINE

## 2018-01-01 PROCEDURE — 96360 HYDRATION IV INFUSION INIT: CPT

## 2018-01-01 RX ORDER — SODIUM CHLORIDE 9 MG/ML
INJECTION, SOLUTION INTRAVENOUS
Status: COMPLETED
Start: 2018-01-01 | End: 2018-01-02

## 2018-01-01 RX ORDER — SODIUM CHLORIDE 0.9 % (FLUSH) 0.9 %
10 SYRINGE (ML) INJECTION AS NEEDED
Status: DISCONTINUED | OUTPATIENT
Start: 2018-01-01 | End: 2018-01-02 | Stop reason: HOSPADM

## 2018-01-01 RX ADMIN — SODIUM CHLORIDE 500 ML: 9 INJECTION, SOLUTION INTRAVENOUS at 22:03

## 2018-01-01 RX ADMIN — SODIUM CHLORIDE: 900 INJECTION, SOLUTION INTRAVENOUS at 22:03

## 2018-01-02 ENCOUNTER — APPOINTMENT (OUTPATIENT)
Dept: CT IMAGING | Facility: HOSPITAL | Age: 38
End: 2018-01-02

## 2018-01-02 VITALS
TEMPERATURE: 98 F | OXYGEN SATURATION: 97 % | HEIGHT: 71 IN | HEART RATE: 73 BPM | BODY MASS INDEX: 44.1 KG/M2 | SYSTOLIC BLOOD PRESSURE: 154 MMHG | WEIGHT: 315 LBS | DIASTOLIC BLOOD PRESSURE: 75 MMHG | RESPIRATION RATE: 20 BRPM

## 2018-01-02 LAB
ALBUMIN SERPL-MCNC: 3.9 G/DL (ref 3.4–4.8)
ALBUMIN/GLOB SERPL: 1 G/DL (ref 1.1–1.8)
ALP SERPL-CCNC: 65 U/L (ref 38–126)
ALT SERPL W P-5'-P-CCNC: 34 U/L (ref 21–72)
ANION GAP SERPL CALCULATED.3IONS-SCNC: 10 MMOL/L (ref 5–15)
APTT PPP: 32.3 SECONDS (ref 20–40.3)
AST SERPL-CCNC: 22 U/L (ref 17–59)
BILIRUB SERPL-MCNC: 0.4 MG/DL (ref 0.2–1.3)
BUN BLD-MCNC: 8 MG/DL (ref 7–21)
BUN/CREAT SERPL: 11.9 (ref 7–25)
CALCIUM SPEC-SCNC: 8.5 MG/DL (ref 8.4–10.2)
CHLORIDE SERPL-SCNC: 97 MMOL/L (ref 95–110)
CO2 SERPL-SCNC: 28 MMOL/L (ref 22–31)
CREAT BLD-MCNC: 0.67 MG/DL (ref 0.7–1.3)
GFR SERPL CREATININE-BSD FRML MDRD: 132 ML/MIN/1.73 (ref 70–162)
GLOBULIN UR ELPH-MCNC: 4 GM/DL (ref 2.3–3.5)
GLUCOSE BLD-MCNC: 345 MG/DL (ref 60–100)
HOLD SPECIMEN: NORMAL
HOLD SPECIMEN: NORMAL
INR PPP: 0.98 (ref 0.8–1.2)
POTASSIUM BLD-SCNC: 4.2 MMOL/L (ref 3.5–5.1)
PROT SERPL-MCNC: 7.9 G/DL (ref 6.3–8.6)
PROTHROMBIN TIME: 12.9 SECONDS (ref 11.1–15.3)
SODIUM BLD-SCNC: 135 MMOL/L (ref 137–145)
TROPONIN I SERPL-MCNC: <0.012 NG/ML
WHOLE BLOOD HOLD SPECIMEN: NORMAL
WHOLE BLOOD HOLD SPECIMEN: NORMAL

## 2018-01-02 PROCEDURE — 71275 CT ANGIOGRAPHY CHEST: CPT

## 2018-01-02 PROCEDURE — 0 IOPAMIDOL PER 1 ML: Performed by: EMERGENCY MEDICINE

## 2018-01-02 RX ORDER — HYDROCODONE BITARTRATE AND ACETAMINOPHEN 5; 325 MG/1; MG/1
1 TABLET ORAL EVERY 6 HOURS PRN
Qty: 15 TABLET | Refills: 0 | Status: SHIPPED | OUTPATIENT
Start: 2018-01-02 | End: 2018-01-15 | Stop reason: ALTCHOICE

## 2018-01-02 RX ADMIN — IOPAMIDOL 92 ML: 755 INJECTION, SOLUTION INTRAVENOUS at 01:06

## 2018-01-02 NOTE — DISCHARGE INSTRUCTIONS
Follow-up with your primary care doctor or cardiologist this week if symptoms not improving.  Teeth current medications.

## 2018-01-02 NOTE — ED PROVIDER NOTES
Subjective   History of Present Illness    Review of Systems    Past Medical History:   Diagnosis Date   • Chest pain    • Chronic back pain    • Coronary artery disease    • Diabetes mellitus    • GERD (gastroesophageal reflux disease)    • Hyperlipidemia    • Hypertension    • Morbid obesity    • Pulmonary embolism    • RLS (restless legs syndrome)    • Seizures    • Sleep apnea        Allergies   Allergen Reactions   • Glipizide Other (See Comments)     Hallucinations     • Phenergan [Promethazine Hcl] Other (See Comments)     Burning veins   • Risperdal [Risperidone] Other (See Comments)     Slurred speech   • Tramadol Other (See Comments)     seizures   • Reglan [Metoclopramide] Anxiety       Past Surgical History:   Procedure Laterality Date   • ADENOIDECTOMY     • CARDIAC CATHETERIZATION N/A 3/15/2017    Procedure: Left Heart Cath;  Surgeon: Edwige Briceno MD;  Location: [facility] CATH INVASIVE LOCATION;  Service:    • CARDIAC CATHETERIZATION N/A 3/15/2017    Procedure: Stent SOPHIA coronary;  Surgeon: Edwige Briceno MD;  Location: [facility] CATH INVASIVE LOCATION;  Service:    • CHOLECYSTECTOMY     • CORONARY ANGIOPLASTY WITH STENT PLACEMENT     • DE RT/LT HEART CATHETERS N/A 3/15/2017    Procedure: Percutaneous Coronary Intervention;  Surgeon: Edwige Briceno MD;  Location: [facility] CATH INVASIVE LOCATION;  Service: Cardiovascular   • TONSILLECTOMY     • TRANSESOPHAGEAL ECHOCARDIOGRAM (MONICA)         Family History   Problem Relation Age of Onset   • Cancer Other    • Coronary artery disease Other    • Diabetes Other    • Heart disease Other    • Hypertension Other        Social History     Social History   • Marital status:      Spouse name: Cori   • Number of children: 0   • Years of education: N/A     Occupational History   • Disabled      Social History Main Topics   • Smoking status: Former Smoker     Quit date: 1997   • Smokeless tobacco: Former User     Types: Snuff     Quit date: 2017   • Alcohol use No   •  Drug use: No   • Sexual activity: Defer     Other Topics Concern   • None     Social History Narrative           Objective   Physical Exam    Procedures         ED Course  ED Course        Labs Reviewed   COMPREHENSIVE METABOLIC PANEL - Abnormal; Notable for the following:        Result Value    Glucose 345 (*)     Creatinine 0.67 (*)     Sodium 135 (*)     Globulin 4.0 (*)     A/G Ratio 1.0 (*)     All other components within normal limits   CBC WITH AUTO DIFFERENTIAL - Abnormal; Notable for the following:     Hemoglobin 13.0 (*)     Hematocrit 38.7 (*)     Immature Grans % 1.8 (*)     Immature Grans, Absolute 0.11 (*)     All other components within normal limits   PROTIME-INR - Normal    Narrative:     Therapeutic range for most indications is 2.0-3.0 INR,  or 2.5-3.5 for mechanical heart valves.   APTT - Normal    Narrative:     The recommended Heparin therapeutic range is 68-97 seconds.   TROPONIN (IN-HOUSE) - Normal   RAINBOW DRAW    Narrative:     The following orders were created for panel order Newborn Draw.  Procedure                               Abnormality         Status                     ---------                               -----------         ------                     Light Blue Top[[id number]]                                   Final result               Green Top (Gel)[[id number]]                                  Final result               Lavender Top[[id number]]                                     Final result               Gold Top - SST[[id number]]                                   Final result                 Please view results for these tests on the individual orders.   CBC AND DIFFERENTIAL    Narrative:     The following orders were created for panel order CBC & Differential.  Procedure                               Abnormality         Status                     ---------                               -----------         ------                     CBC Auto Differential[[id number]]         Abnormal            Final result                 Please view results for these tests on the individual orders.   LIGHT BLUE TOP   GREEN TOP   LAVENDER TOP   GOLD TOP - SST       CT Angiogram Chest With Contrast   Final Result   1. Bolus timing limits evaluation for pulmonary embolus with no   evidence of a large or central pulmonary embolus but cannot fully   exclude small peripheral pulmonary emboli on this exam.   2. Otherwise no acute abnormality.      Electronically signed by:  Pelon Anderson  1/2/2018 1:27 AM [facility]   Workstation: [id number]        Please see Dr. Sparrow's note for history and physical exam.  Patient was CTA with continued shortness of breath and pleuritic pain with breathing.  No change in his PE's are noted.  No new large PEs are noted.  Patient maintaining his saturations.  No evidence of MI.  Patient will follow-up with his physicians for further pain management and cardiac care.  Patient will continue on his Xarelto.        [facility]    Final diagnoses:   Chest pain, unspecified type   Shortness of breath            Asim Kumar MD  01/02/18 0144

## 2018-01-02 NOTE — ED PROVIDER NOTES
Subjective   History of Present Illness  Patient is a 37-year-old male is here with chest pain.  He recently was in the hospital for pulmonary emboli but failed outpatient therapy.  He is change from L questions were altered.  Said increased pain on the right side yesterday.  He came in today because it was much worse.  Pain is worse when he takes deep breath.  He thinks a pulmonary emboli are causing him pain.  Review of Systems   Constitutional: Negative for activity change, appetite change, chills, diaphoresis, fever and unexpected weight change.   Respiratory: Negative for apnea, cough, choking, chest tightness, shortness of breath, wheezing and stridor.    Cardiovascular: Positive for chest pain and leg swelling. Negative for palpitations.   All other systems reviewed and are negative.      Past Medical History:   Diagnosis Date   • Chest pain    • Chronic back pain    • Coronary artery disease    • Diabetes mellitus    • GERD (gastroesophageal reflux disease)    • Hyperlipidemia    • Hypertension    • Morbid obesity    • Pulmonary embolism    • RLS (restless legs syndrome)    • Seizures    • Sleep apnea        Allergies   Allergen Reactions   • Glipizide Other (See Comments)     Hallucinations     • Phenergan [Promethazine Hcl] Other (See Comments)     Burning veins   • Risperdal [Risperidone] Other (See Comments)     Slurred speech   • Tramadol Other (See Comments)     seizures   • Reglan [Metoclopramide] Anxiety       Past Surgical History:   Procedure Laterality Date   • ADENOIDECTOMY     • CARDIAC CATHETERIZATION N/A 3/15/2017    Procedure: Left Heart Cath;  Surgeon: Edwige Briceno MD;  Location: [facility] CATH INVASIVE LOCATION;  Service:    • CARDIAC CATHETERIZATION N/A 3/15/2017    Procedure: Stent SOPHIA coronary;  Surgeon: Edwige Briceno MD;  Location: [facility] CATH INVASIVE LOCATION;  Service:    • CHOLECYSTECTOMY     • CORONARY ANGIOPLASTY WITH STENT PLACEMENT     • [place] RT/LT HEART CATHETERS N/A 3/15/2017     Procedure: Percutaneous Coronary Intervention;  Surgeon: Edwige Briceno MD;  Location: [facility] INVASIVE LOCATION;  Service: Cardiovascular   • TONSILLECTOMY     • TRANSESOPHAGEAL ECHOCARDIOGRAM (MONICA)         Family History   Problem Relation Age of Onset   • Cancer Other    • Coronary artery disease Other    • Diabetes Other    • Heart disease Other    • Hypertension Other        Social History     Social History   • Marital status:      Spouse name: Cori   • Number of children: 0   • Years of education: N/A     Occupational History   • Disabled      Social History Main Topics   • Smoking status: Former Smoker     Quit date: 1997   • Smokeless tobacco: Former User     Types: Snuff     Quit date: 2017   • Alcohol use No   • Drug use: No   • Sexual activity: Defer     Other Topics Concern   • Not on file     Social History Narrative           Objective   Physical Exam   Constitutional: He is oriented to person, place, and time.   Morbidly obese.  Comfortable.   Cardiovascular: Normal rate, regular rhythm and normal heart sounds.    Pulmonary/Chest: Effort normal and breath sounds normal. No respiratory distress. He has no wheezes. He has no rales. He exhibits no tenderness.   Abdominal: Soft. Bowel sounds are normal. He exhibits no distension and no mass. There is no tenderness. There is no rebound and no guarding.   Neurological: He is alert and oriented to person, place, and time. Coordination normal.   Skin: He is not diaphoretic.   Psychiatric: He has a normal mood and affect. His behavior is normal. Judgment and thought content normal.   Nursing note and vitals reviewed.      Procedures         ED Course  ED Course    11:26 PM patient was checked out to Dr. Asim Orozco in the emergency department.          MDM  Number of Diagnoses or Management Options     Amount and/or Complexity of Data Reviewed  Clinical lab tests: ordered and reviewed  Tests in the radiology section of CPT®: ordered and  reviewed  Tests in the medicine section of CPT®: reviewed and ordered  Decide to obtain previous medical records or to obtain history from someone other than the patient: yes  Review and summarize past medical records: yes  Independent visualization of images, tracings, or specimens: yes    Risk of Complications, Morbidity, and/or Mortality  Presenting problems: high  Diagnostic procedures: high  Management options: high        Final diagnoses:   None            Santosh Sparrow MD  01/01/18 3975

## 2018-01-04 ENCOUNTER — OFFICE VISIT (OUTPATIENT)
Dept: CARDIOLOGY | Facility: CLINIC | Age: 38
End: 2018-01-04

## 2018-01-04 VITALS
HEIGHT: 71 IN | DIASTOLIC BLOOD PRESSURE: 84 MMHG | WEIGHT: 315 LBS | SYSTOLIC BLOOD PRESSURE: 134 MMHG | OXYGEN SATURATION: 95 % | BODY MASS INDEX: 44.1 KG/M2 | HEART RATE: 75 BPM

## 2018-01-04 DIAGNOSIS — I27.82 OTHER CHRONIC PULMONARY EMBOLISM WITHOUT ACUTE COR PULMONALE (HCC): ICD-10-CM

## 2018-01-04 DIAGNOSIS — E11.9 TYPE 2 DIABETES MELLITUS WITHOUT COMPLICATION, WITHOUT LONG-TERM CURRENT USE OF INSULIN (HCC): ICD-10-CM

## 2018-01-04 DIAGNOSIS — I73.9 CLAUDICATION (HCC): ICD-10-CM

## 2018-01-04 DIAGNOSIS — E78.2 MIXED HYPERLIPIDEMIA: ICD-10-CM

## 2018-01-04 DIAGNOSIS — Z99.89 OSA ON CPAP: ICD-10-CM

## 2018-01-04 DIAGNOSIS — G47.33 OSA ON CPAP: ICD-10-CM

## 2018-01-04 DIAGNOSIS — R06.09 DYSPNEA ON EXERTION: ICD-10-CM

## 2018-01-04 DIAGNOSIS — I25.10 CORONARY ARTERY DISEASE INVOLVING NATIVE CORONARY ARTERY OF NATIVE HEART WITHOUT ANGINA PECTORIS: Primary | ICD-10-CM

## 2018-01-04 DIAGNOSIS — R60.0 LOCALIZED EDEMA: ICD-10-CM

## 2018-01-04 PROCEDURE — 99214 OFFICE O/P EST MOD 30 MIN: CPT | Performed by: INTERNAL MEDICINE

## 2018-01-04 RX ORDER — METOPROLOL SUCCINATE 200 MG/1
200 TABLET, EXTENDED RELEASE ORAL DAILY
Qty: 31 TABLET | Refills: 6 | Status: SHIPPED | OUTPATIENT
Start: 2018-01-04 | End: 2018-01-26 | Stop reason: SDUPTHER

## 2018-01-04 NOTE — PROGRESS NOTES
Cardiovascular Medicine   Antony Jenkins M.D., Ph.D., [facility]      The patient returns to cardiology clinic for follow-up of the following cardiac problems:    PROBLEM LIST:  1. PE  A. 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 DM2  3. HLD  4. HTN  5. ASCAD  -July 2016 mLAD 2.5 x 16mm at [place]  -March 15, 2017 patient underwent diagnostic coronary angiogram which revealed significant in-stent restenosis in the LAD stent.   -He underwent successful PCI.  2.5 x 18 mm xience alpine stent was deployed successfully  6. ADOLFO  A. CPAP  7. Former smoker    PE  He was diagnosed with bilateral PE in April of 2017. He has had repeated CTA showing various filling defects. He is now on Xarelto acute dosing.  He has seen CT surgery.  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  He tells me that he will continue his treatment and CT surgery.  He does not wish to transition his care to me.  His last echocardiogram was in adequate for the evaluation of pulmonary hypertension.    ASCAD  This is a former pt of Dr. Briceno. He had CP and had mLAD show obstructive disease. He underwent PCI to his LAD with a 2.5mm x 16mm to the mLAD. March in 2017 he had CP and Dr. Briceno performed LHC which revealed ISR and underwent 2.5 x 18mm SOPHIA. He is on Clopidogrel. He is not on ASA due to his Xarelto. He also had Imdur, Metoprolol and Ranexa. He has had no resting, exertional or nocturnal angina. He is on pravastatin.     HLD  Concerning the patient's hyperlipidemia, the patient remains on a statin.  They are tolerating this very well.  Denies side effects.  Specifically, the patient denies any prior history of asymptomatic LFT elevation, myositis or myalgias.  Patient's laboratory evaluations are followed closely by their PCP.    Dyspnea  He has significant dyspnea. He does have ADOLFO and is on CPAP. He has not recently seen a sleep physician. He is a former smoker.     LE pain  Patient's had no prior history of DVT, but does  have lower extremity pain.  This does appear to worsen with walking.      Review of Systems - History obtained from chart review  General ROS: negative  Respiratory ROS: positive for - cough and pleuritic pain  Cardiovascular ROS: positive for - dyspnea on exertion.  All other systems were reviewed and were negative.  family history includes Cancer in his other; Coronary artery disease in his other; Diabetes in his other; Heart disease in his other; Hypertension in his other.   reports that he quit smoking about 21 years ago. He quit smokeless tobacco use about a year ago. His smokeless tobacco use included Snuff. He reports that he does not drink alcohol or use illicit drugs.  Allergies   Allergen Reactions   • Glipizide Other (See Comments)     Hallucinations     • Phenergan [Promethazine Hcl] Other (See Comments)     Burning veins   • Risperdal [Risperidone] Other (See Comments)     Slurred speech   • Tramadol Other (See Comments)     seizures   • Reglan [Metoclopramide] Anxiety       Current Outpatient Prescriptions:   •  acetaminophen (TYLENOL) 325 MG tablet, Take 2 tablets by mouth Every 4 (Four) Hours As Needed for Mild Pain ., Disp: , Rfl:   •  albuterol (ACCUNEB) 1.25 MG/3ML nebulizer solution, Take 3 mL by nebulization Every 6 (Six) Hours As Needed for Wheezing., Disp: 100 vial, Rfl: 0  •  albuterol (PROVENTIL HFA;VENTOLIN HFA) 108 (90 BASE) MCG/ACT inhaler, Inhale 2 puffs Every 4 (Four) Hours As Needed for Wheezing., Disp: , Rfl:   •  amLODIPine (NORVASC) 10 MG tablet, Take 1 tablet by mouth Every Night., Disp: 30 tablet, Rfl: 5  •  Canagliflozin 100 MG tablet, Take 1 tablet by mouth Every Morning Before Breakfast., Disp: 90 tablet, Rfl: 2  •  clopidogrel (PLAVIX) 75 MG tablet, Take 1 tablet by mouth Daily., Disp: 30 tablet, Rfl: 3  •  HYDROcodone-acetaminophen (NORCO) 5-325 MG per tablet, Take 1 tablet by mouth Every 6 (Six) Hours As Needed for Moderate Pain ., Disp: 15 tablet, Rfl: 0  •   ipratropium-albuterol (DUO-NEB) 0.5-2.5 mg/mL nebulizer, EVERY FOUR HOURS as needed for SHORTNESS OF BREATH, Disp: , Rfl:   •  isosorbide mononitrate (IMDUR) 120 MG 24 hr tablet, Take 120 mg by mouth Daily., Disp: , Rfl:   •  lisinopril (PRINIVIL,ZESTRIL) 40 MG tablet, Take 1 tablet by mouth Every Morning., Disp: 30 tablet, Rfl: 5  •  metFORMIN (GLUCOPHAGE) 1000 MG tablet, TWICE A DAY, Disp: , Rfl:   •  MICROLET LANCETS misc, USE TO TEST BLOOD SUGAR EVERY DAY AND AS NEEDED, Disp: , Rfl:   •  nitroglycerin (NITROSTAT) 0.4 MG SL tablet, Place 1 tablet under the tongue Every 5 (Five) Minutes As Needed for Chest Pain. Take no more than 3 doses in 15 minutes., Disp: 100 tablet, Rfl: 12  •  pantoprazole (PROTONIX) 40 MG EC tablet, Take 1 tablet by mouth Every Night., Disp: 30 tablet, Rfl: 5  •  pramipexole (MIRAPEX) 1 MG tablet, Take 2 tablets by mouth Every Night. Taking 2mg daily, Disp: 60 tablet, Rfl: 5  •  pravastatin (PRAVACHOL) 80 MG tablet, Take 1 tablet by mouth Every Night., Disp: 30 tablet, Rfl: 5  •  ranolazine (RANEXA) 1000 MG 12 hr tablet, Take 1 tablet by mouth Every 12 (Twelve) Hours., Disp: 60 tablet, Rfl: 5  •  rivaroxaban (XARELTO) 15 MG tablet, Take 1 tablet by mouth 2 (Two) Times a Day With Meals., Disp: 42 tablet, Rfl: 0  •  sertraline (ZOLOFT) 25 MG tablet, Take 1 tablet by mouth Daily., Disp: 30 tablet, Rfl: 5  •  traZODone (DESYREL) 300 MG tablet, Take 1 tablet by mouth Every Night., Disp: 30 tablet, Rfl: 5    Physical Exam:  Vitals:    01/04/18 1316   BP: 134/84   Pulse: 75   SpO2: 95%     Body mass index is 65.93 kg/(m^2).    GEN: alert, appears stated age and cooperative  Body Habitus: morbidly obese  Neuro: CN II-XII grossly intact.   HEENT: Head: Normocephalic, no lesions, without obvious abnormality.  Neck / Thyroid: Supple, no masses, nodes, nodules or enlargement. No arcus senilis, xanthelasma or xanthomas.  Normal external ears. No drainage.  JVP: 8 cm of water at 45 degrees HJR: absent       Carotid:  Upstroke: easily palpated bilaterally Volume: Normal.    Carotid Bruit:  None  Subclavian Bruit: Not present.      Precordium:  No palpable heaves or thrusts. P2 is not palpable.   Hood:  normal size and placement Palpable S4: Not present.   Heart rate: normal  Heart Rhythm: regular     Heart Sounds: S1: normal intensity  S2: normal intensity  S3: absent   S4: absent  Opening Snap: absent  A2-OS:  N/A  Pericardial rub: absent    Ejection click: None      Murmurs: Systolic: none  Diastolic: none  Abdomen: deferred  Extremity: no edema, cyanosis  Pulses: Right radial artery has 2+ (normal) pulse and Left radial artery has 2+ (normal) pulse    DATA REVIEWED:     Interpretation Summary   · Left ventricular systolic function is normal. Estimated EF = 60%. No wall motion abnormality noted  · Left atrial cavity size is borderline dilated.  · suboptimal study due to body habitus                         Assessment/Plan      1. ASCAD. No anginal symptoms. The patient has been revascularized withPCI to LAD.   -Continue current treatment regimen.  -Toprol XL 200mg  -Clopidogrel (Plavix) and ASA is not prescribed due to his use of NOAC  -Pravachol (pravastatin)    2.  Bilateral pulmonary embolism with no recent documentation of PA systolic pressure.  I had an extended discussion with the patient about his diagnoses of pulmonary embolism.  I have personally reviewed his numerous CT scans.  Unfortunately, many of the more recent scans had suboptimal timing of contrast so for evaluation of his pulmonary vasculature could not be performed.  He does have a prothrombin [id number] mutation.  This was heterozygous.  I certainly agree that given his a BMI that he should continue on anticoagulation.  I do not see the utility of acute dosing, especially based on a CTA with inadequate opacification of the pulmonary vasculature.  I also believe that he needs a thorough evaluation for pulmonary hypertension.  I discussed with him  that this will be difficult given his body habitus, but we certainly can perform Definity contrast with injection of agitated saline.  -He will continue follow-up with CT surgery  -Will perform TTE with Definity contrast and injection of agitated saline for screening for pulmonary hypertension  -6MWT    3. Dyspnea.  I suspect this is multifactorial given his PE, obesity and sedentary lifestyle.  He did have PFTs last fall but these did not include volumes.  I spoke with him about a thorough pulmonary evaluation and referral to pulmonary.  He was agreeable to this approach.  -Full PFTs and referral to Dr. Currie  -Sleep referral as his CPAP has had no monitoring or adjustment    4. Cardiac Risk Assessment:   -Pt. has known coronary artery disease and meets indication for statin therapy.   -Lipid-lowering medications: Pravachol (pravastatin)  -Recommended ASA  -Daily activity of at least 30 minutes  -BP goal <130/80  -Recommended screening for PAD with ABIs based on Society for vascular surgery guidelines 2015: Adults greater than 70 years of age, smoker's, individuals with diabetes or those with established CAD.  -Labs today    5.  Lower extremity pain, unable to exclude claudication.  -ABIs    6. Tobacco status: Former smoker.    7. AAA screening: Indicated at the age of 63.    Plan for follow-up: in 6 weeks          This document has been electronically signed by Antony Jenkins MD PhD on January 4, 2018 2:01 PM

## 2018-01-06 ENCOUNTER — HOSPITAL ENCOUNTER (EMERGENCY)
Facility: HOSPITAL | Age: 38
Discharge: HOME | End: 2018-01-06
Attending: FAMILY MEDICINE | Admitting: FAMILY MEDICINE

## 2018-01-06 ENCOUNTER — APPOINTMENT (OUTPATIENT)
Dept: GENERAL RADIOLOGY | Facility: HOSPITAL | Age: 38
End: 2018-01-06

## 2018-01-06 VITALS
HEIGHT: 71 IN | HEART RATE: 80 BPM | TEMPERATURE: 97.9 F | BODY MASS INDEX: 44.1 KG/M2 | OXYGEN SATURATION: 95 % | WEIGHT: 315 LBS | SYSTOLIC BLOOD PRESSURE: 144 MMHG | DIASTOLIC BLOOD PRESSURE: 89 MMHG | RESPIRATION RATE: 24 BRPM

## 2018-01-06 DIAGNOSIS — R07.81 PLEURODYNIA: Primary | ICD-10-CM

## 2018-01-06 LAB
ALBUMIN SERPL-MCNC: 3.9 G/DL (ref 3.4–4.8)
ALBUMIN/GLOB SERPL: 1.1 G/DL (ref 1.1–1.8)
ALP SERPL-CCNC: 70 U/L (ref 38–126)
ALT SERPL W P-5'-P-CCNC: 47 U/L (ref 21–72)
ANION GAP SERPL CALCULATED.3IONS-SCNC: 8 MMOL/L (ref 5–15)
AST SERPL-CCNC: 28 U/L (ref 17–59)
BASOPHILS # BLD AUTO: 0.02 10*3/MM3 (ref 0–0.2)
BASOPHILS NFR BLD AUTO: 0.3 % (ref 0–2)
BILIRUB SERPL-MCNC: 0.3 MG/DL (ref 0.2–1.3)
BUN BLD-MCNC: 14 MG/DL (ref 7–21)
BUN/CREAT SERPL: 17.5 (ref 7–25)
CALCIUM SPEC-SCNC: 9.1 MG/DL (ref 8.4–10.2)
CHLORIDE SERPL-SCNC: 93 MMOL/L (ref 95–110)
CK MB SERPL-CCNC: 2.26 NG/ML (ref 0–5)
CK SERPL-CCNC: 175 U/L (ref 55–170)
CO2 SERPL-SCNC: 28 MMOL/L (ref 22–31)
CREAT BLD-MCNC: 0.8 MG/DL (ref 0.7–1.3)
DEPRECATED RDW RBC AUTO: 42.1 FL (ref 35.1–43.9)
EOSINOPHIL # BLD AUTO: 0.23 10*3/MM3 (ref 0–0.7)
EOSINOPHIL NFR BLD AUTO: 3.1 % (ref 0–7)
ERYTHROCYTE [DISTWIDTH] IN BLOOD BY AUTOMATED COUNT: 14.1 % (ref 11.5–14.5)
GFR SERPL CREATININE-BSD FRML MDRD: 108 ML/MIN/1.73 (ref 70–162)
GLOBULIN UR ELPH-MCNC: 3.4 GM/DL (ref 2.3–3.5)
GLUCOSE BLD-MCNC: 320 MG/DL (ref 60–100)
HCT VFR BLD AUTO: 39.6 % (ref 39–49)
HGB BLD-MCNC: 13.4 G/DL (ref 13.7–17.3)
IMM GRANULOCYTES # BLD: 0.07 10*3/MM3 (ref 0–0.02)
IMM GRANULOCYTES NFR BLD: 0.9 % (ref 0–0.5)
LYMPHOCYTES # BLD AUTO: 1.54 10*3/MM3 (ref 0.6–4.2)
LYMPHOCYTES NFR BLD AUTO: 20.7 % (ref 10–50)
MCH RBC QN AUTO: 27.5 PG (ref 26.5–34)
MCHC RBC AUTO-ENTMCNC: 33.8 G/DL (ref 31.5–36.3)
MCV RBC AUTO: 81.3 FL (ref 80–98)
MONOCYTES # BLD AUTO: 0.7 10*3/MM3 (ref 0–0.9)
MONOCYTES NFR BLD AUTO: 9.4 % (ref 0–12)
NEUTROPHILS # BLD AUTO: 4.89 10*3/MM3 (ref 2–8.6)
NEUTROPHILS NFR BLD AUTO: 65.6 % (ref 37–80)
NT-PROBNP SERPL-MCNC: 35.3 PG/ML (ref 0–450)
PLATELET # BLD AUTO: 162 10*3/MM3 (ref 150–450)
PMV BLD AUTO: 8.9 FL (ref 8–12)
POTASSIUM BLD-SCNC: 4.1 MMOL/L (ref 3.5–5.1)
PROT SERPL-MCNC: 7.3 G/DL (ref 6.3–8.6)
RBC # BLD AUTO: 4.87 10*6/MM3 (ref 4.37–5.74)
SODIUM BLD-SCNC: 129 MMOL/L (ref 137–145)
TROPONIN I SERPL-MCNC: <0.012 NG/ML
WBC NRBC COR # BLD: 7.45 10*3/MM3 (ref 3.2–9.8)

## 2018-01-06 PROCEDURE — 25010000002 HYDROMORPHONE PER 4 MG: Performed by: FAMILY MEDICINE

## 2018-01-06 PROCEDURE — 82553 CREATINE MB FRACTION: CPT | Performed by: FAMILY MEDICINE

## 2018-01-06 PROCEDURE — 93005 ELECTROCARDIOGRAM TRACING: CPT | Performed by: FAMILY MEDICINE

## 2018-01-06 PROCEDURE — 96374 THER/PROPH/DIAG INJ IV PUSH: CPT

## 2018-01-06 PROCEDURE — 80053 COMPREHEN METABOLIC PANEL: CPT | Performed by: FAMILY MEDICINE

## 2018-01-06 PROCEDURE — 84484 ASSAY OF TROPONIN QUANT: CPT | Performed by: FAMILY MEDICINE

## 2018-01-06 PROCEDURE — 83880 ASSAY OF NATRIURETIC PEPTIDE: CPT | Performed by: FAMILY MEDICINE

## 2018-01-06 PROCEDURE — 71046 X-RAY EXAM CHEST 2 VIEWS: CPT

## 2018-01-06 PROCEDURE — 82550 ASSAY OF CK (CPK): CPT | Performed by: FAMILY MEDICINE

## 2018-01-06 PROCEDURE — 85025 COMPLETE CBC W/AUTO DIFF WBC: CPT | Performed by: FAMILY MEDICINE

## 2018-01-06 PROCEDURE — 99283 EMERGENCY DEPT VISIT LOW MDM: CPT

## 2018-01-06 RX ORDER — ONDANSETRON 4 MG/1
8 TABLET, ORALLY DISINTEGRATING ORAL ONCE
Status: COMPLETED | OUTPATIENT
Start: 2018-01-06 | End: 2018-01-06

## 2018-01-06 RX ORDER — ONDANSETRON 4 MG/1
4 TABLET, ORALLY DISINTEGRATING ORAL EVERY 6 HOURS PRN
Qty: 10 TABLET | Refills: 0 | Status: SHIPPED | OUTPATIENT
Start: 2018-01-06 | End: 2018-05-17 | Stop reason: ALTCHOICE

## 2018-01-06 RX ORDER — HYDROCODONE BITARTRATE AND ACETAMINOPHEN 10; 325 MG/1; MG/1
1 TABLET ORAL EVERY 6 HOURS PRN
Qty: 15 TABLET | Refills: 0 | Status: SHIPPED | OUTPATIENT
Start: 2018-01-06 | End: 2018-03-19

## 2018-01-06 RX ORDER — IPRATROPIUM BROMIDE AND ALBUTEROL SULFATE 2.5; .5 MG/3ML; MG/3ML
3 SOLUTION RESPIRATORY (INHALATION) ONCE
Status: COMPLETED | OUTPATIENT
Start: 2018-01-06 | End: 2018-01-06

## 2018-01-06 RX ORDER — HYDROCODONE BITARTRATE AND ACETAMINOPHEN 10; 325 MG/1; MG/1
1 TABLET ORAL ONCE
Status: COMPLETED | OUTPATIENT
Start: 2018-01-06 | End: 2018-01-06

## 2018-01-06 RX ADMIN — HYDROMORPHONE HYDROCHLORIDE 1 MG: 1 INJECTION, SOLUTION INTRAMUSCULAR; INTRAVENOUS; SUBCUTANEOUS at 01:42

## 2018-01-06 RX ADMIN — IPRATROPIUM BROMIDE AND ALBUTEROL SULFATE 3 ML: .5; 3 SOLUTION RESPIRATORY (INHALATION) at 01:03

## 2018-01-06 RX ADMIN — HYDROCODONE BITARTRATE AND ACETAMINOPHEN 1 TABLET: 10; 325 TABLET ORAL at 02:10

## 2018-01-06 RX ADMIN — ONDANSETRON 8 MG: 4 TABLET, ORALLY DISINTEGRATING ORAL at 01:02

## 2018-01-06 NOTE — ED PROVIDER NOTES
Subjective   Patient is a 37 y.o. male presenting with shortness of breath.   Shortness of Breath   Severity:  Moderate  Onset quality:  Sudden  Duration:  4 hours  Timing:  Constant  Progression:  Waxing and waning  Chronicity:  Recurrent  Relieved by:  Nothing  Worsened by:  Nothing  Ineffective treatments:  None tried  Associated symptoms: chest pain and vomiting    Associated symptoms: no abdominal pain, no claudication, no cough, no diaphoresis, no ear pain, no fever, no headaches, no neck pain, no rash, no sore throat, no sputum production, no syncope and no wheezing    Risk factors: hx of PE/DVT    Risk factors: no recent alcohol use and no tobacco use (vapes)        Review of Systems   Constitutional: Negative for appetite change, chills, diaphoresis, fatigue and fever.   HENT: Negative for congestion, ear discharge, ear pain, nosebleeds, rhinorrhea, sinus pressure, sore throat and trouble swallowing.    Eyes: Negative for discharge and redness.   Respiratory: Positive for shortness of breath. Negative for apnea, cough, sputum production, chest tightness and wheezing.    Cardiovascular: Positive for chest pain. Negative for claudication and syncope.   Gastrointestinal: Positive for vomiting. Negative for abdominal pain, diarrhea and nausea.   Endocrine: Negative for polyuria.   Genitourinary: Negative for dysuria, frequency and urgency.   Musculoskeletal: Negative for myalgias and neck pain.   Skin: Negative for color change and rash.   Allergic/Immunologic: Negative for immunocompromised state.   Neurological: Negative for dizziness, seizures, syncope, weakness, light-headedness and headaches.   Hematological: Negative for adenopathy. Does not bruise/bleed easily.   Psychiatric/Behavioral: Negative for behavioral problems and confusion.   All other systems reviewed and are negative.      Past Medical History:   Diagnosis Date   • Chest pain    • Chronic back pain    • Coronary artery disease    • Diabetes  mellitus    • GERD (gastroesophageal reflux disease)    • Hyperlipidemia    • Hypertension    • Morbid obesity    • Pulmonary embolism    • RLS (restless legs syndrome)    • Seizures    • Sleep apnea        Allergies   Allergen Reactions   • Glipizide Other (See Comments)     Hallucinations     • Phenergan [Promethazine Hcl] Other (See Comments)     Burning veins   • Risperdal [Risperidone] Other (See Comments)     Slurred speech   • Tramadol Other (See Comments)     seizures   • Reglan [Metoclopramide] Anxiety       Past Surgical History:   Procedure Laterality Date   • ADENOIDECTOMY     • CARDIAC CATHETERIZATION N/A 3/15/2017    Procedure: Left Heart Cath;  Surgeon: Edwige Bricneo MD;  Location: [facility] CATH INVASIVE LOCATION;  Service:    • CARDIAC CATHETERIZATION N/A 3/15/2017    Procedure: Stent SOPHIA coronary;  Surgeon: Edwige Briceno MD;  Location: [facility] CATH INVASIVE LOCATION;  Service:    • CHOLECYSTECTOMY     • CORONARY ANGIOPLASTY WITH STENT PLACEMENT     • [place] RT/LT HEART CATHETERS N/A 3/15/2017    Procedure: Percutaneous Coronary Intervention;  Surgeon: Edwige Briceno MD;  Location: [facility] CATH INVASIVE LOCATION;  Service: Cardiovascular   • TONSILLECTOMY     • TRANSESOPHAGEAL ECHOCARDIOGRAM (MONICA)         Family History   Problem Relation Age of Onset   • Cancer Other    • Coronary artery disease Other    • Diabetes Other    • Heart disease Other    • Hypertension Other        Social History     Social History   • Marital status:      Spouse name: Cori   • Number of children: 0   • Years of education: N/A     Occupational History   • Disabled      Social History Main Topics   • Smoking status: Former Smoker     Quit date: 1997   • Smokeless tobacco: Former User     Types: Snuff     Quit date: 2017   • Alcohol use No   • Drug use: No   • Sexual activity: Defer     Other Topics Concern   • None     Social History Narrative           Objective   Physical Exam   Constitutional: He is oriented to person,  place, and time. He appears well-developed and well-nourished.   HENT:   Head: Normocephalic and atraumatic.   Nose: Nose normal.   Mouth/Throat: Oropharynx is clear and moist.   Eyes: Conjunctivae and EOM are normal. Pupils are equal, round, and reactive to light. Right eye exhibits no discharge. Left eye exhibits no discharge. No scleral icterus.   Neck: Normal range of motion. Neck supple. No tracheal deviation present.   Cardiovascular: Normal rate, regular rhythm and normal heart sounds.    No murmur heard.  Pulmonary/Chest: Effort normal and breath sounds normal. No stridor. No respiratory distress. He has no wheezes. He has no rales.   Abdominal: Soft. Bowel sounds are normal. He exhibits no distension and no mass. There is no tenderness. There is no rebound and no guarding.   Musculoskeletal: He exhibits no edema.   Neurological: He is alert and oriented to person, place, and time. Coordination normal.   Skin: Skin is warm and dry. No rash noted. No erythema.   Psychiatric: He has a normal mood and affect. His behavior is normal. Thought content normal.   Nursing note and vitals reviewed.      ECG 12 Lead    Date/Time: 1/6/2018 2:03 AM  Performed by: MAGED LOYA  Authorized by: MAGED LOYA   Interpreted by physician  Rhythm: sinus rhythm  Rate: normal  BPM: 67  ST Segments: ST segments normal                 ED Course  ED Course   Comment By Time   Patient has had 3 CT angiograms of his coronary arteries, 6 CT angiograms of his chest, and one CAT scan of his chest, totaling 11 chest CAT scans this past year. Maged Loya MD 01/06 0146   O2 saturation rate 98% on room air, heart rate 74 bpm, blood pressure normotensive. Maged Loya MD 01/06 0202          Labs Reviewed   COMPREHENSIVE METABOLIC PANEL - Abnormal; Notable for the following:        Result Value    Glucose 320 (*)     Sodium 129 (*)     Chloride 93 (*)     All other components within normal limits   CK -  Abnormal; Notable for the following:     Creatine Kinase 175 (*)     All other components within normal limits   CBC WITH AUTO DIFFERENTIAL - Abnormal; Notable for the following:     Hemoglobin 13.4 (*)     Immature Grans % 0.9 (*)     Immature Grans, Absolute 0.07 (*)     All other components within normal limits   BNP (IN-HOUSE) - Normal   TROPONIN (IN-HOUSE) - Normal   CK MB - Normal   CBC AND DIFFERENTIAL    Narrative:     The following orders were created for panel order CBC & Differential.  Procedure                               Abnormality         Status                     ---------                               -----------         ------                     CBC Auto Differential[[id number]]        Abnormal            Final result                 Please view results for these tests on the individual orders.       XR Chest 2 View   Final Result   No active disease.      Electronically signed by:  Pelon Anderson  1/6/2018 12:59 AM   [facility] Workstation: [id number]                    [facility]    Final diagnoses:   Pleurodynia            Red Loya MD  01/06/18 0202       Red Loya MD  01/06/18 0205       Red Loya MD  01/06/18 0212

## 2018-01-15 ENCOUNTER — OFFICE VISIT (OUTPATIENT)
Dept: CARDIAC SURGERY | Facility: CLINIC | Age: 38
End: 2018-01-15

## 2018-01-15 VITALS
WEIGHT: 315 LBS | HEIGHT: 71 IN | DIASTOLIC BLOOD PRESSURE: 98 MMHG | BODY MASS INDEX: 44.1 KG/M2 | TEMPERATURE: 98.2 F | OXYGEN SATURATION: 99 % | HEART RATE: 90 BPM | SYSTOLIC BLOOD PRESSURE: 162 MMHG

## 2018-01-15 DIAGNOSIS — D68.2 FACTOR II DEFICIENCY (HCC): ICD-10-CM

## 2018-01-15 DIAGNOSIS — I27.82 OTHER CHRONIC PULMONARY EMBOLISM WITHOUT ACUTE COR PULMONALE (HCC): Primary | Chronic | ICD-10-CM

## 2018-01-15 PROCEDURE — 99213 OFFICE O/P EST LOW 20 MIN: CPT | Performed by: NURSE PRACTITIONER

## 2018-01-17 ENCOUNTER — HOSPITAL ENCOUNTER (OUTPATIENT)
Facility: HOSPITAL | Age: 38
Setting detail: OBSERVATION
Discharge: HOME | End: 2018-01-18
Attending: EMERGENCY MEDICINE | Admitting: FAMILY MEDICINE

## 2018-01-17 DIAGNOSIS — IMO0001 CLASS 3 OBESITY DUE TO EXCESS CALORIES WITH SERIOUS COMORBIDITY AND BODY MASS INDEX (BMI) OF 60.0 TO 69.9 IN ADULT: ICD-10-CM

## 2018-01-17 DIAGNOSIS — R07.9 CHEST PAIN, RULE OUT ACUTE MYOCARDIAL INFARCTION: Primary | ICD-10-CM

## 2018-01-17 DIAGNOSIS — R06.02 SHORTNESS OF BREATH: Chronic | ICD-10-CM

## 2018-01-17 PROCEDURE — 99284 EMERGENCY DEPT VISIT MOD MDM: CPT

## 2018-01-17 RX ORDER — SODIUM CHLORIDE 0.9 % (FLUSH) 0.9 %
10 SYRINGE (ML) INJECTION AS NEEDED
Status: DISCONTINUED | OUTPATIENT
Start: 2018-01-17 | End: 2018-01-18 | Stop reason: HOSPADM

## 2018-01-17 RX ORDER — ASPIRIN 325 MG
325 TABLET ORAL ONCE
Status: DISCONTINUED | OUTPATIENT
Start: 2018-01-18 | End: 2018-01-18

## 2018-01-17 NOTE — PROGRESS NOTES
Subjective   Patient ID: Yordy Hansen is a 37 y.o. male is here today for follow-up for follow up anticoagulation, chronic pulmonary embolus.  PCP MD Ronald Tam MD   CC:  R side chest pain chronic  [facility]   History of Present Illness  History of Present Illness: 37 y.o. male with known CAD SP DUE stent X2 to LAD in June 2016 for which he continues on Plavix which he has tolerated well, he stopped smoking then. He is morbid obesity. He states he is not sedentary, and has no prolonged sitting. 2016 CTA pulmonary demonstrated RLL X 2 partial filling defects Echo: EF 50% Mild MR/TR No RV strain noted. Lower extremity Duplex: No DVT. CVTS saw in hospital for anticoagulation recommendations. He was originally on Coumadin therapy and wished to be switched to NOAC.  Was difficult to achieve therapeutic level.   He was changed to Xarelto. Hypercoagulable panel + Factor 2 deficiency. Was recently admitted to [facility] with CP and chronic dyspnea.  Returns for follow up.  Needs PA completed on Xarelto.         The following portions of the patient's history were reviewed and updated as appropriate: allergies, current medications, past family history, past medical history, past social history, past surgical history and problem list. See HPI  Allergies   Allergen Reactions   • Glipizide Other (See Comments)     Hallucinations     • Phenergan [Promethazine Hcl] Other (See Comments)     Burning veins   • Risperdal [Risperidone] Other (See Comments)     Slurred speech   • Tramadol Other (See Comments)     seizures   • Reglan [Metoclopramide] Anxiety         Current Outpatient Prescriptions:   •  albuterol (ACCUNEB) 1.25 MG/3ML nebulizer solution, Take 3 mL by nebulization Every 6 (Six) Hours As Needed for Wheezing., Disp: 100 vial, Rfl: 0  •  albuterol (PROVENTIL HFA;VENTOLIN HFA) 108 (90 BASE) MCG/ACT inhaler, Inhale 2 puffs Every 4 (Four) Hours As Needed for Wheezing., Disp: , Rfl:   •  amLODIPine (NORVASC) 10 MG  tablet, Take 1 tablet by mouth Every Night., Disp: 30 tablet, Rfl: 5  •  Canagliflozin 100 MG tablet, Take 1 tablet by mouth Every Morning Before Breakfast., Disp: 90 tablet, Rfl: 2  •  clopidogrel (PLAVIX) 75 MG tablet, Take 1 tablet by mouth Daily., Disp: 30 tablet, Rfl: 3  •  HYDROcodone-acetaminophen (NORCO)  MG per tablet, Take 1 tablet by mouth Every 6 (Six) Hours As Needed for Moderate Pain ., Disp: 15 tablet, Rfl: 0  •  isosorbide mononitrate (IMDUR) 120 MG 24 hr tablet, Take 120 mg by mouth Daily., Disp: , Rfl:   •  lisinopril (PRINIVIL,ZESTRIL) 40 MG tablet, Take 1 tablet by mouth Every Morning., Disp: 30 tablet, Rfl: 5  •  metFORMIN (GLUCOPHAGE) 1000 MG tablet, TWICE A DAY, Disp: , Rfl:   •  metoprolol succinate XL (TOPROL XL) 200 MG 24 hr tablet, Take 1 tablet by mouth Daily., Disp: 31 tablet, Rfl: 6  •  MICROLET LANCETS misc, USE TO TEST BLOOD SUGAR EVERY DAY AND AS NEEDED, Disp: , Rfl:   •  nitroglycerin (NITROSTAT) 0.4 MG SL tablet, Place 1 tablet under the tongue Every 5 (Five) Minutes As Needed for Chest Pain. Take no more than 3 doses in 15 minutes., Disp: 100 tablet, Rfl: 12  •  ondansetron ODT (ZOFRAN-ODT) 4 MG disintegrating tablet, Take 1 tablet by mouth Every 6 (Six) Hours As Needed for Nausea or Vomiting., Disp: 10 tablet, Rfl: 0  •  pantoprazole (PROTONIX) 40 MG EC tablet, Take 1 tablet by mouth Every Night., Disp: 30 tablet, Rfl: 5  •  pramipexole (MIRAPEX) 1 MG tablet, Take 2 tablets by mouth Every Night. Taking 2mg daily, Disp: 60 tablet, Rfl: 5  •  pravastatin (PRAVACHOL) 80 MG tablet, Take 1 tablet by mouth Every Night., Disp: 30 tablet, Rfl: 5  •  ranolazine (RANEXA) 1000 MG 12 hr tablet, Take 1 tablet by mouth Every 12 (Twelve) Hours., Disp: 60 tablet, Rfl: 5  •  rivaroxaban (XARELTO) 20 MG tablet, Take 20 mg by mouth Daily., Disp: , Rfl:   •  sertraline (ZOLOFT) 25 MG tablet, Take 1 tablet by mouth Daily., Disp: 30 tablet, Rfl: 5  •  traZODone (DESYREL) 300 MG tablet, Take 1  tablet by mouth Every Night., Disp: 30 tablet, Rfl: 5    Review of Systems   Cardiovascular: Positive for chest pain (R side constant ache). Negative for dyspnea on exertion.   Respiratory: Positive for cough and shortness of breath. Negative for hemoptysis and wheezing.    Gastrointestinal: Positive for hemorrhoids. Negative for hematemesis and melena.        1 episode of br red rectal bleeding.      Constitutional: Negative for activity change, appetite change, chills, fatigue and fever.   Denies use of power tools, electric knives, chain saws, or conttact sports. Shaves with safety razor. Aware to use soft tooth brush and waxed floss.  No recent falls.  Denies: GIB, GUB, or Seizures.   HENT: Negative for congestion, nosebleeds, sore throat and voice change.   Eyes: Negative for visual disturbance.   Respiratory. Negative for cough, choking, shortness of breath, wheezing and stridor.   Cardiovascular: Positive for chest pain (Improved ). Negative for palpitations and leg swelling.   Gastrointestinal: Negative for abdominal pain, blood in stool, constipation, diarrhea and nausea.   Genitourinary: Negative for hematuria.   Musculoskeletal: Negative for back pain and joint swelling.   Skin: Negative for color change, pallor and rash.   Allergic/Immunologic: Negative for environmental allergies.   Neurological: Negative for dizziness, seizures, syncope, weakness, light-headedness, numbness and headaches.   Hematological: Does not bruise/bleed easily.   Psychiatric/Behavioral: The patient is not nervous/anxious.         Objective   Physical Exam   Pulmonary/Chest: Effort normal and breath sounds normal. No respiratory distress.   Intermittent rub     Constitutional: He is oriented to person, place, and time. He appears morbidly obese.  HENT:   Head: Normocephalic.   Mouth/Throat: Oropharynx is clear and moist.   Eyes: Conjunctivae are normal. Pupils are equal, round, and reactive to light.   Neck: No JVD present. No  tracheal deviation present.   Cardiovascular: Normal rate, regular rhythm, Distant normal heart sounds and intact distal pulses.   Pulses:  Carotid pulses are 1+ on the right side with bruit, and 1+ on the left side.  Radial pulses are 2+ on the right side, and 2+ on the left side.   Dorsalis pedis pulses are 2+ on the right side, and 2+ on the left side.   Posterior tibial pulses are 2+ on the right side, and 2+ on the left side.   Pulmonary/Chest: Effort normal and breath sounds normal. No stridor.   Distant  Abdominal: Soft. Bowel sounds are normal. He exhibits no distension. There is no tenderness.   Musculoskeletal: He exhibits no edema or tenderness.   Neurological: He is alert and oriented to person, place, and time. No cranial nerve deficit.   Skin: Skin is warm and dry. No rash noted. No erythema. No pallor.   Psychiatric: Judgment normal.   Nursing note and vitals reviewed.     Vitals:    01/15/18 1408   BP: 162/98   Pulse: 90   Temp: 98.2 °F (36.8 °C)   SpO2: 99%       Lab Results   Component Value Date    WBC 7.45 01/06/2018    HGB 13.4 (L) 01/06/2018    HCT 39.6 01/06/2018    MCV 81.3 01/06/2018     01/06/2018     Lab Results   Component Value Date    GLUCOSE 320 (H) 01/06/2018    BUN 14 01/06/2018    CREATININE 0.80 01/06/2018    EGFRIFNONA 108 01/06/2018    BCR 17.5 01/06/2018    K 4.1 01/06/2018    CO2 28.0 01/06/2018    CALCIUM 9.1 01/06/2018    ALBUMIN 3.90 01/06/2018    LABIL2 1.1 01/06/2018    AST 28 01/06/2018    ALT 47 01/06/2018         Assessment/Plan   Independent Review of Radiographic Studies:    No new studies     1. Pulmonary embolism on long-term anticoagulation therapy  Tolerating Xarelto well. Continue lifelong. PA completed and faxed   - rivaroxaban (XARELTO) 20 MG tablet; Take 1 tablet by mouth Daily. (Patient taking differently: Take 20 mg by mouth Daily With Dinner.)  Dispense: 30 tablet; Refill: 11  Recheck  1 mth with lab unless already done  If rectal bleeding changes  notify     2. Factor II deficiency  See #1  - rivaroxaban (XARELTO) 20 MG tablet; Take 1 tablet by mouth Daily. (Patient taking differently: Take 20 mg by mouth Daily With Dinner.)  Dispense: 30 tablet; Refill: 11    3.  Chronic CP  PCP manages.

## 2018-01-18 ENCOUNTER — APPOINTMENT (OUTPATIENT)
Dept: GENERAL RADIOLOGY | Facility: HOSPITAL | Age: 38
End: 2018-01-18

## 2018-01-18 ENCOUNTER — TELEPHONE (OUTPATIENT)
Dept: FAMILY MEDICINE CLINIC | Facility: CLINIC | Age: 38
End: 2018-01-18

## 2018-01-18 VITALS
SYSTOLIC BLOOD PRESSURE: 160 MMHG | WEIGHT: 315 LBS | DIASTOLIC BLOOD PRESSURE: 77 MMHG | HEART RATE: 63 BPM | OXYGEN SATURATION: 96 % | TEMPERATURE: 96.4 F | HEIGHT: 71 IN | RESPIRATION RATE: 20 BRPM | BODY MASS INDEX: 44.1 KG/M2

## 2018-01-18 PROBLEM — R07.9 CHEST PAIN, RULE OUT ACUTE MYOCARDIAL INFARCTION: Status: RESOLVED | Noted: 2018-01-18 | Resolved: 2018-01-18

## 2018-01-18 PROBLEM — I25.10 CORONARY ARTERY DISEASE INVOLVING NATIVE CORONARY ARTERY OF NATIVE HEART: Status: ACTIVE | Noted: 2017-12-21

## 2018-01-18 PROBLEM — B37.2 CANDIDAL INTERTRIGO: Status: ACTIVE | Noted: 2018-01-18

## 2018-01-18 PROBLEM — E11.65 TYPE 2 DIABETES MELLITUS WITH HYPERGLYCEMIA, WITHOUT LONG-TERM CURRENT USE OF INSULIN: Status: ACTIVE | Noted: 2017-12-21

## 2018-01-18 PROBLEM — R07.9 CHEST PAIN, RULE OUT ACUTE MYOCARDIAL INFARCTION: Status: ACTIVE | Noted: 2018-01-18

## 2018-01-18 LAB
ALBUMIN SERPL-MCNC: 4 G/DL (ref 3.4–4.8)
ALBUMIN/GLOB SERPL: 1.2 G/DL (ref 1.1–1.8)
ALP SERPL-CCNC: 81 U/L (ref 38–126)
ALT SERPL W P-5'-P-CCNC: 36 U/L (ref 21–72)
ANION GAP SERPL CALCULATED.3IONS-SCNC: 13 MMOL/L (ref 5–15)
AST SERPL-CCNC: 35 U/L (ref 17–59)
BASOPHILS # BLD AUTO: 0.01 10*3/MM3 (ref 0–0.2)
BASOPHILS NFR BLD AUTO: 0.2 % (ref 0–2)
BILIRUB SERPL-MCNC: 0.3 MG/DL (ref 0.2–1.3)
BUN BLD-MCNC: 10 MG/DL (ref 7–21)
BUN/CREAT SERPL: 12.7 (ref 7–25)
CALCIUM SPEC-SCNC: 8.7 MG/DL (ref 8.4–10.2)
CHLORIDE SERPL-SCNC: 96 MMOL/L (ref 95–110)
CO2 SERPL-SCNC: 29 MMOL/L (ref 22–31)
CREAT BLD-MCNC: 0.79 MG/DL (ref 0.7–1.3)
D-DIMER, QUANTITATIVE (MAD,POW, STR): <270 NG/ML (FEU) (ref 0–470)
DEPRECATED RDW RBC AUTO: 42.2 FL (ref 35.1–43.9)
EOSINOPHIL # BLD AUTO: 0.29 10*3/MM3 (ref 0–0.7)
EOSINOPHIL NFR BLD AUTO: 5 % (ref 0–7)
ERYTHROCYTE [DISTWIDTH] IN BLOOD BY AUTOMATED COUNT: 14.3 % (ref 11.5–14.5)
GFR SERPL CREATININE-BSD FRML MDRD: 109 ML/MIN/1.73 (ref 60–162)
GLOBULIN UR ELPH-MCNC: 3.3 GM/DL (ref 2.3–3.5)
GLUCOSE BLD-MCNC: 366 MG/DL (ref 60–100)
GLUCOSE BLDC GLUCOMTR-MCNC: 209 MG/DL (ref 70–130)
GLUCOSE BLDC GLUCOMTR-MCNC: 288 MG/DL (ref 70–130)
HBA1C MFR BLD: 10 % (ref 4–5.6)
HCT VFR BLD AUTO: 39.1 % (ref 39–49)
HGB BLD-MCNC: 13.3 G/DL (ref 13.7–17.3)
HOLD SPECIMEN: NORMAL
HOLD SPECIMEN: NORMAL
IMM GRANULOCYTES # BLD: 0.05 10*3/MM3 (ref 0–0.02)
IMM GRANULOCYTES NFR BLD: 0.9 % (ref 0–0.5)
LYMPHOCYTES # BLD AUTO: 1.3 10*3/MM3 (ref 0.6–4.2)
LYMPHOCYTES NFR BLD AUTO: 22.5 % (ref 10–50)
MCH RBC QN AUTO: 27.6 PG (ref 26.5–34)
MCHC RBC AUTO-ENTMCNC: 34 G/DL (ref 31.5–36.3)
MCV RBC AUTO: 81.1 FL (ref 80–98)
MONOCYTES # BLD AUTO: 0.51 10*3/MM3 (ref 0–0.9)
MONOCYTES NFR BLD AUTO: 8.8 % (ref 0–12)
NEUTROPHILS # BLD AUTO: 3.62 10*3/MM3 (ref 2–8.6)
NEUTROPHILS NFR BLD AUTO: 62.6 % (ref 37–80)
NT-PROBNP SERPL-MCNC: 34.7 PG/ML (ref 0–450)
PLATELET # BLD AUTO: 167 10*3/MM3 (ref 150–450)
PMV BLD AUTO: 9.1 FL (ref 8–12)
POTASSIUM BLD-SCNC: 4 MMOL/L (ref 3.5–5.1)
PROT SERPL-MCNC: 7.3 G/DL (ref 6.3–8.6)
RBC # BLD AUTO: 4.82 10*6/MM3 (ref 4.37–5.74)
SODIUM BLD-SCNC: 138 MMOL/L (ref 137–145)
TROPONIN I SERPL-MCNC: <0.012 NG/ML
WBC NRBC COR # BLD: 5.78 10*3/MM3 (ref 3.2–9.8)
WHOLE BLOOD HOLD SPECIMEN: NORMAL

## 2018-01-18 PROCEDURE — G0378 HOSPITAL OBSERVATION PER HR: HCPCS

## 2018-01-18 PROCEDURE — 83036 HEMOGLOBIN GLYCOSYLATED A1C: CPT | Performed by: FAMILY MEDICINE

## 2018-01-18 PROCEDURE — 71045 X-RAY EXAM CHEST 1 VIEW: CPT

## 2018-01-18 PROCEDURE — 85025 COMPLETE CBC W/AUTO DIFF WBC: CPT | Performed by: EMERGENCY MEDICINE

## 2018-01-18 PROCEDURE — 96374 THER/PROPH/DIAG INJ IV PUSH: CPT

## 2018-01-18 PROCEDURE — 25010000002 KETOROLAC TROMETHAMINE PER 15 MG: Performed by: FAMILY MEDICINE

## 2018-01-18 PROCEDURE — 96375 TX/PRO/DX INJ NEW DRUG ADDON: CPT

## 2018-01-18 PROCEDURE — 63710000001 INSULIN ASPART PER 5 UNITS: Performed by: STUDENT IN AN ORGANIZED HEALTH CARE EDUCATION/TRAINING PROGRAM

## 2018-01-18 PROCEDURE — 84484 ASSAY OF TROPONIN QUANT: CPT | Performed by: STUDENT IN AN ORGANIZED HEALTH CARE EDUCATION/TRAINING PROGRAM

## 2018-01-18 PROCEDURE — 99219 PR INITIAL OBSERVATION CARE/DAY 50 MINUTES: CPT | Performed by: FAMILY MEDICINE

## 2018-01-18 PROCEDURE — 25010000002 ONDANSETRON PER 1 MG: Performed by: EMERGENCY MEDICINE

## 2018-01-18 PROCEDURE — 85379 FIBRIN DEGRADATION QUANT: CPT | Performed by: EMERGENCY MEDICINE

## 2018-01-18 PROCEDURE — 83880 ASSAY OF NATRIURETIC PEPTIDE: CPT | Performed by: EMERGENCY MEDICINE

## 2018-01-18 PROCEDURE — 25010000002 HYDROMORPHONE PER 4 MG: Performed by: EMERGENCY MEDICINE

## 2018-01-18 PROCEDURE — 80053 COMPREHEN METABOLIC PANEL: CPT | Performed by: EMERGENCY MEDICINE

## 2018-01-18 PROCEDURE — 94760 N-INVAS EAR/PLS OXIMETRY 1: CPT

## 2018-01-18 PROCEDURE — 84484 ASSAY OF TROPONIN QUANT: CPT | Performed by: FAMILY MEDICINE

## 2018-01-18 PROCEDURE — 94640 AIRWAY INHALATION TREATMENT: CPT

## 2018-01-18 PROCEDURE — 82962 GLUCOSE BLOOD TEST: CPT

## 2018-01-18 PROCEDURE — 84484 ASSAY OF TROPONIN QUANT: CPT | Performed by: EMERGENCY MEDICINE

## 2018-01-18 PROCEDURE — 63710000001 INSULIN ASPART PER 5 UNITS: Performed by: FAMILY MEDICINE

## 2018-01-18 RX ORDER — AMLODIPINE BESYLATE 10 MG/1
10 TABLET ORAL NIGHTLY
Status: DISCONTINUED | OUTPATIENT
Start: 2018-01-18 | End: 2018-01-18 | Stop reason: HOSPADM

## 2018-01-18 RX ORDER — TRAZODONE HYDROCHLORIDE 150 MG/1
300 TABLET ORAL NIGHTLY
Status: DISCONTINUED | OUTPATIENT
Start: 2018-01-18 | End: 2018-01-18 | Stop reason: HOSPADM

## 2018-01-18 RX ORDER — ONDANSETRON 2 MG/ML
4 INJECTION INTRAMUSCULAR; INTRAVENOUS ONCE
Status: COMPLETED | OUTPATIENT
Start: 2018-01-18 | End: 2018-01-18

## 2018-01-18 RX ORDER — NYSTATIN 100000 [USP'U]/G
POWDER TOPICAL EVERY 8 HOURS SCHEDULED
Status: DISCONTINUED | OUTPATIENT
Start: 2018-01-18 | End: 2018-01-18 | Stop reason: HOSPADM

## 2018-01-18 RX ORDER — DEXTROSE MONOHYDRATE 25 G/50ML
25 INJECTION, SOLUTION INTRAVENOUS
Status: DISCONTINUED | OUTPATIENT
Start: 2018-01-18 | End: 2018-01-18 | Stop reason: HOSPADM

## 2018-01-18 RX ORDER — SERTRALINE HYDROCHLORIDE 25 MG/1
25 TABLET, FILM COATED ORAL DAILY
Status: DISCONTINUED | OUTPATIENT
Start: 2018-01-18 | End: 2018-01-18 | Stop reason: HOSPADM

## 2018-01-18 RX ORDER — RANOLAZINE 500 MG/1
1000 TABLET, EXTENDED RELEASE ORAL EVERY 12 HOURS SCHEDULED
Status: DISCONTINUED | OUTPATIENT
Start: 2018-01-18 | End: 2018-01-18 | Stop reason: HOSPADM

## 2018-01-18 RX ORDER — CLOPIDOGREL BISULFATE 75 MG/1
75 TABLET ORAL DAILY
Status: DISCONTINUED | OUTPATIENT
Start: 2018-01-18 | End: 2018-01-18 | Stop reason: HOSPADM

## 2018-01-18 RX ORDER — PRAMIPEXOLE DIHYDROCHLORIDE 1 MG/1
1 TABLET ORAL NIGHTLY
Status: DISCONTINUED | OUTPATIENT
Start: 2018-01-18 | End: 2018-01-18 | Stop reason: HOSPADM

## 2018-01-18 RX ORDER — IPRATROPIUM BROMIDE AND ALBUTEROL SULFATE 2.5; .5 MG/3ML; MG/3ML
3 SOLUTION RESPIRATORY (INHALATION)
Status: DISCONTINUED | OUTPATIENT
Start: 2018-01-18 | End: 2018-01-18 | Stop reason: HOSPADM

## 2018-01-18 RX ORDER — ACETAMINOPHEN 325 MG/1
650 TABLET ORAL EVERY 4 HOURS PRN
Status: DISCONTINUED | OUTPATIENT
Start: 2018-01-18 | End: 2018-01-18 | Stop reason: HOSPADM

## 2018-01-18 RX ORDER — ISOSORBIDE MONONITRATE 60 MG/1
120 TABLET, EXTENDED RELEASE ORAL DAILY
Status: DISCONTINUED | OUTPATIENT
Start: 2018-01-18 | End: 2018-01-18 | Stop reason: HOSPADM

## 2018-01-18 RX ORDER — NICOTINE POLACRILEX 4 MG
15 LOZENGE BUCCAL
Status: DISCONTINUED | OUTPATIENT
Start: 2018-01-18 | End: 2018-01-18 | Stop reason: HOSPADM

## 2018-01-18 RX ORDER — METOPROLOL SUCCINATE 100 MG/1
200 TABLET, EXTENDED RELEASE ORAL DAILY
Status: DISCONTINUED | OUTPATIENT
Start: 2018-01-18 | End: 2018-01-18 | Stop reason: HOSPADM

## 2018-01-18 RX ORDER — ASPIRIN 81 MG/1
81 TABLET ORAL DAILY
Status: DISCONTINUED | OUTPATIENT
Start: 2018-01-18 | End: 2018-01-18 | Stop reason: HOSPADM

## 2018-01-18 RX ORDER — KETOROLAC TROMETHAMINE 30 MG/ML
30 INJECTION, SOLUTION INTRAMUSCULAR; INTRAVENOUS EVERY 6 HOURS PRN
Status: DISCONTINUED | OUTPATIENT
Start: 2018-01-18 | End: 2018-01-18 | Stop reason: HOSPADM

## 2018-01-18 RX ORDER — SENNA AND DOCUSATE SODIUM 50; 8.6 MG/1; MG/1
2 TABLET, FILM COATED ORAL NIGHTLY
Status: DISCONTINUED | OUTPATIENT
Start: 2018-01-18 | End: 2018-01-18 | Stop reason: HOSPADM

## 2018-01-18 RX ORDER — LISINOPRIL 40 MG/1
40 TABLET ORAL EVERY MORNING
Status: DISCONTINUED | OUTPATIENT
Start: 2018-01-18 | End: 2018-01-18 | Stop reason: HOSPADM

## 2018-01-18 RX ORDER — ONDANSETRON 4 MG/1
4 TABLET, ORALLY DISINTEGRATING ORAL EVERY 6 HOURS PRN
Status: DISCONTINUED | OUTPATIENT
Start: 2018-01-18 | End: 2018-01-18 | Stop reason: HOSPADM

## 2018-01-18 RX ORDER — IPRATROPIUM BROMIDE AND ALBUTEROL SULFATE 2.5; .5 MG/3ML; MG/3ML
3 SOLUTION RESPIRATORY (INHALATION) ONCE
Status: COMPLETED | OUTPATIENT
Start: 2018-01-18 | End: 2018-01-18

## 2018-01-18 RX ORDER — FLUCONAZOLE 150 MG/1
150 TABLET ORAL ONCE
Status: COMPLETED | OUTPATIENT
Start: 2018-01-18 | End: 2018-01-18

## 2018-01-18 RX ORDER — ATORVASTATIN CALCIUM 40 MG/1
40 TABLET, FILM COATED ORAL NIGHTLY
Status: DISCONTINUED | OUTPATIENT
Start: 2018-01-18 | End: 2018-01-18 | Stop reason: HOSPADM

## 2018-01-18 RX ORDER — ONDANSETRON 4 MG/1
4 TABLET, FILM COATED ORAL EVERY 6 HOURS PRN
Status: DISCONTINUED | OUTPATIENT
Start: 2018-01-18 | End: 2018-01-18 | Stop reason: HOSPADM

## 2018-01-18 RX ORDER — PANTOPRAZOLE SODIUM 40 MG/1
40 TABLET, DELAYED RELEASE ORAL NIGHTLY
Status: DISCONTINUED | OUTPATIENT
Start: 2018-01-18 | End: 2018-01-18 | Stop reason: HOSPADM

## 2018-01-18 RX ORDER — ONDANSETRON 2 MG/ML
4 INJECTION INTRAMUSCULAR; INTRAVENOUS EVERY 6 HOURS PRN
Status: DISCONTINUED | OUTPATIENT
Start: 2018-01-18 | End: 2018-01-18 | Stop reason: HOSPADM

## 2018-01-18 RX ORDER — NITROGLYCERIN 0.4 MG/1
0.4 TABLET SUBLINGUAL
Status: DISCONTINUED | OUTPATIENT
Start: 2018-01-18 | End: 2018-01-18 | Stop reason: HOSPADM

## 2018-01-18 RX ORDER — SODIUM CHLORIDE 0.9 % (FLUSH) 0.9 %
1-10 SYRINGE (ML) INJECTION AS NEEDED
Status: DISCONTINUED | OUTPATIENT
Start: 2018-01-18 | End: 2018-01-18 | Stop reason: HOSPADM

## 2018-01-18 RX ADMIN — IPRATROPIUM BROMIDE AND ALBUTEROL SULFATE 3 ML: 2.5; .5 SOLUTION RESPIRATORY (INHALATION) at 00:54

## 2018-01-18 RX ADMIN — HYDROMORPHONE HYDROCHLORIDE 0.5 MG: 1 INJECTION, SOLUTION INTRAMUSCULAR; INTRAVENOUS; SUBCUTANEOUS at 00:55

## 2018-01-18 RX ADMIN — NITROGLYCERIN 1 INCH: 20 OINTMENT TOPICAL at 00:54

## 2018-01-18 RX ADMIN — RIVAROXABAN 20 MG: 10 TABLET, FILM COATED ORAL at 08:53

## 2018-01-18 RX ADMIN — INSULIN ASPART 4 UNITS: 100 INJECTION, SOLUTION INTRAVENOUS; SUBCUTANEOUS at 08:52

## 2018-01-18 RX ADMIN — LISINOPRIL 40 MG: 40 TABLET ORAL at 08:53

## 2018-01-18 RX ADMIN — IPRATROPIUM BROMIDE AND ALBUTEROL SULFATE 3 ML: .5; 3 SOLUTION RESPIRATORY (INHALATION) at 08:43

## 2018-01-18 RX ADMIN — FLUCONAZOLE 150 MG: 150 TABLET ORAL at 05:38

## 2018-01-18 RX ADMIN — ISOSORBIDE MONONITRATE 120 MG: 60 TABLET, EXTENDED RELEASE ORAL at 08:53

## 2018-01-18 RX ADMIN — SERTRALINE HYDROCHLORIDE 25 MG: 25 TABLET ORAL at 08:53

## 2018-01-18 RX ADMIN — CLOPIDOGREL BISULFATE 75 MG: 75 TABLET ORAL at 08:53

## 2018-01-18 RX ADMIN — METFORMIN HYDROCHLORIDE 1000 MG: 500 TABLET ORAL at 08:53

## 2018-01-18 RX ADMIN — ONDANSETRON 4 MG: 2 INJECTION INTRAMUSCULAR; INTRAVENOUS at 00:55

## 2018-01-18 RX ADMIN — RANOLAZINE 1000 MG: 500 TABLET, FILM COATED, EXTENDED RELEASE ORAL at 08:53

## 2018-01-18 RX ADMIN — KETOROLAC TROMETHAMINE 30 MG: 30 INJECTION, SOLUTION INTRAMUSCULAR at 05:13

## 2018-01-18 RX ADMIN — INSULIN ASPART 8 UNITS: 100 INJECTION, SOLUTION INTRAVENOUS; SUBCUTANEOUS at 11:27

## 2018-01-18 RX ADMIN — INSULIN ASPART 6 UNITS: 100 INJECTION, SOLUTION INTRAVENOUS; SUBCUTANEOUS at 11:27

## 2018-01-18 RX ADMIN — METOPROLOL SUCCINATE 200 MG: 100 TABLET, EXTENDED RELEASE ORAL at 08:53

## 2018-01-18 RX ADMIN — ASPIRIN 81 MG: 81 TABLET, COATED ORAL at 08:53

## 2018-01-18 NOTE — H&P
"HISTORY AND PHYSICAL  NAME: Yordy Hansen  : 1980  MRN: [id number]    DATE OF ADMISSION: 18    DATE & TIME SEEN: 18 3:18 AM    PCP: LALA Barajas    CODE STATUS: Full Code    CHIEF COMPLAINT  Chief Complaint   Patient presents with   • Shortness of Breath   • Chest Pain     HPI:  Yordy Hansen is a 37 y.o. morbidly obese male with a concurrent medical history of coronary artery disease status post stenting of a in-stent restenosis of the LAD in 2017 who presents with midsternal, intermittent, stabbing pressure-like chest pain that began approximately 6 hours ago while the patient was sitting seated and watching TV. Pain occasionally radiates to left shoulder and clavicle. Pain was relieved temporarily with \"a couple of nitros\", sublingually, and rest. No aggravating factors. Associated symptoms include dyspnea, headache, and nausea.     Meals consisted of  BoyRD with tomato sauce, sausage slices, and biscuits. The night prior patient had fried chicken strips and potato fries. Discussed the possibility of GERD contributing to patient's chest pain.  Given patient's cardiac risk factors, patient was admitted overnight for observation.    CONCURRENT MEDICAL HISTORY:  Past Medical History:   Diagnosis Date   • Chest pain    • Chronic back pain    • Coronary artery disease    • Diabetes mellitus    • GERD (gastroesophageal reflux disease)    • Hyperlipidemia    • Hypertension    • Morbid obesity    • Pulmonary embolism    • RLS (restless legs syndrome)    • Seizures    • Sleep apnea        PAST SURGICAL HISTORY:  Past Surgical History:   Procedure Laterality Date   • ADENOIDECTOMY     • CARDIAC CATHETERIZATION N/A 3/15/2017    Procedure: Left Heart Cath;  Surgeon: Edwige Briceno MD;  Location: [facility] INVASIVE LOCATION;  Service:    • CARDIAC CATHETERIZATION N/A 3/15/2017    Procedure: Stent SOPHIA coronary;  Surgeon: Edwige Briceno MD;  Location: [facility] INVASIVE LOCATION;  " Service:    • CHOLECYSTECTOMY     • CORONARY ANGIOPLASTY WITH STENT PLACEMENT     • GA RT/LT HEART CATHETERS N/A 3/15/2017    Procedure: Percutaneous Coronary Intervention;  Surgeon: Edwige Briceno MD;  Location: [facility] INVASIVE LOCATION;  Service: Cardiovascular   • TONSILLECTOMY     • TRANSESOPHAGEAL ECHOCARDIOGRAM (MONICA)         FAMILY HISTORY:  Family History   Problem Relation Age of Onset   • Heart disease Mother    • Hypertension Mother    • Hyperlipidemia Mother    • Coronary artery disease Mother    • Cancer Paternal Grandfather         SOCIAL HISTORY:  Social History     Social History   • Marital status:      Spouse name: Cori   • Number of children: 0   • Years of education: N/A     Occupational History   • Disabled      Social History Main Topics   • Smoking status: Former Smoker     Quit date: 1997   • Smokeless tobacco: Current User     Types: Snuff   • Alcohol use No   • Drug use: No   • Sexual activity: Yes     Partners: Female     Birth control/ protection: None     Other Topics Concern   • Not on file     Social History Narrative   • No narrative on file       HOME MEDICATIONS:  No current facility-administered medications on file prior to encounter.      Current Outpatient Prescriptions on File Prior to Encounter   Medication Sig Dispense Refill   • albuterol (ACCUNEB) 1.25 MG/3ML nebulizer solution Take 3 mL by nebulization Every 6 (Six) Hours As Needed for Wheezing. 100 vial 0   • albuterol (PROVENTIL HFA;VENTOLIN HFA) 108 (90 BASE) MCG/ACT inhaler Inhale 2 puffs Every 4 (Four) Hours As Needed for Wheezing.     • amLODIPine (NORVASC) 10 MG tablet Take 1 tablet by mouth Every Night. 30 tablet 5   • Canagliflozin 100 MG tablet Take 1 tablet by mouth Every Morning Before Breakfast. 90 tablet 2   • clopidogrel (PLAVIX) 75 MG tablet Take 1 tablet by mouth Daily. 30 tablet 3   • HYDROcodone-acetaminophen (NORCO)  MG per tablet Take 1 tablet by mouth Every 6 (Six) Hours As Needed for  Moderate Pain . 15 tablet 0   • isosorbide mononitrate (IMDUR) 120 MG 24 hr tablet Take 120 mg by mouth Daily.     • lisinopril (PRINIVIL,ZESTRIL) 40 MG tablet Take 1 tablet by mouth Every Morning. 30 tablet 5   • metFORMIN (GLUCOPHAGE) 1000 MG tablet TWICE A DAY     • metoprolol succinate XL (TOPROL XL) 200 MG 24 hr tablet Take 1 tablet by mouth Daily. 31 tablet 6   • MICROLET LANCETS misc USE TO TEST BLOOD SUGAR EVERY DAY AND AS NEEDED     • nitroglycerin (NITROSTAT) 0.4 MG SL tablet Place 1 tablet under the tongue Every 5 (Five) Minutes As Needed for Chest Pain. Take no more than 3 doses in 15 minutes. 100 tablet 12   • ondansetron ODT (ZOFRAN-ODT) 4 MG disintegrating tablet Take 1 tablet by mouth Every 6 (Six) Hours As Needed for Nausea or Vomiting. 10 tablet 0   • pantoprazole (PROTONIX) 40 MG EC tablet Take 1 tablet by mouth Every Night. 30 tablet 5   • pramipexole (MIRAPEX) 1 MG tablet Take 2 tablets by mouth Every Night. Taking 2mg daily 60 tablet 5   • pravastatin (PRAVACHOL) 80 MG tablet Take 1 tablet by mouth Every Night. 30 tablet 5   • ranolazine (RANEXA) 1000 MG 12 hr tablet Take 1 tablet by mouth Every 12 (Twelve) Hours. 60 tablet 5   • rivaroxaban (XARELTO) 20 MG tablet Take 20 mg by mouth Daily.     • sertraline (ZOLOFT) 25 MG tablet Take 1 tablet by mouth Daily. 30 tablet 5   • traZODone (DESYREL) 300 MG tablet Take 1 tablet by mouth Every Night. 30 tablet 5       ALLERGIES:  Glipizide; Risperdal [risperidone]; Tramadol; and Reglan [metoclopramide]    REVIEW OF SYSTEMS  Review of Systems   Constitutional: Negative for chills, diaphoresis, fatigue and fever.   HENT: Negative for congestion, sneezing and sore throat.    Eyes: Positive for visual disturbance (blurred vision in right eye due to amblyopia ). Negative for discharge and redness.   Respiratory: Positive for cough and shortness of breath. Negative for wheezing.    Cardiovascular: Positive for chest pain. Negative for palpitations and leg  swelling.   Gastrointestinal: Positive for nausea. Negative for abdominal pain, constipation, diarrhea and vomiting.   Genitourinary: Negative for difficulty urinating, dysuria and hematuria.   Musculoskeletal: Negative for gait problem and joint swelling.   Skin: Positive for rash (intertrigal folds). Negative for wound.   Neurological: Positive for headaches. Negative for dizziness and light-headedness.   Psychiatric/Behavioral: Negative for agitation, confusion and sleep disturbance.       PHYSICAL EXAM:  Temp:  [99.1 °F (37.3 °C)] 99.1 °F (37.3 °C)  Heart Rate:  [78-94] 94  Resp:  [24] 24  BP: (162-165)/(73-80) 165/73  Body mass index is 66.39 kg/(m^2).  Physical Exam   Constitutional: He is oriented to person, place, and time. He appears well-developed and well-nourished.   HENT:   Head: Normocephalic and atraumatic.   Right Ear: Tympanic membrane and ear canal normal.   Left Ear: Tympanic membrane and ear canal normal.   Mouth/Throat: Oropharynx is clear and moist. Dental caries present.   Eyes: Conjunctivae and EOM are normal. Pupils are equal, round, and reactive to light.   Amblyopia of right eye.    Neck: Normal range of motion. Neck supple.   Cardiovascular: Normal rate, regular rhythm, normal heart sounds and intact distal pulses.    Pulmonary/Chest: Effort normal. He has decreased breath sounds (bilaterally). He has no wheezes.   Abdominal: Soft. Bowel sounds are normal.   Musculoskeletal: Normal range of motion. He exhibits no edema or tenderness.   Neurological: He is alert and oriented to person, place, and time.   Skin: Skin is warm. Rash noted. He is not diaphoretic.        Psychiatric: He has a normal mood and affect. His behavior is normal. Judgment and thought content normal.   Nursing note and vitals reviewed.      DIAGNOSTIC DATA:   Lab Results (last 24 hours)     Procedure Component Value Units Date/Time    CBC & Differential [[id number]] Collected:  01/18/18 0001    Specimen:  Blood Updated:   01/18/18 0013    Narrative:       The following orders were created for panel order CBC & Differential.  Procedure                               Abnormality         Status                     ---------                               -----------         ------                     CBC Auto Differential[[id number]]        Abnormal            Final result                 Please view results for these tests on the individual orders.    CBC Auto Differential [[id number]]  (Abnormal) Collected:  01/18/18 0001    Specimen:  Blood Updated:  01/18/18 0013     WBC 5.78 10*3/mm3      RBC 4.82 10*6/mm3      Hemoglobin 13.3 (L) g/dL      Hematocrit 39.1 %      MCV 81.1 fL      MCH 27.6 pg      MCHC 34.0 g/dL      RDW 14.3 %      RDW-SD 42.2 fl      MPV 9.1 fL      Platelets 167 10*3/mm3      Neutrophil % 62.6 %      Lymphocyte % 22.5 %      Monocyte % 8.8 %      Eosinophil % 5.0 %      Basophil % 0.2 %      Immature Grans % 0.9 (H) %      Neutrophils, Absolute 3.62 10*3/mm3      Lymphocytes, Absolute 1.30 10*3/mm3      Monocytes, Absolute 0.51 10*3/mm3      Eosinophils, Absolute 0.29 10*3/mm3      Basophils, Absolute 0.01 10*3/mm3      Immature Grans, Absolute 0.05 (H) 10*3/mm3     Troponin [[id number]]  (Normal) Collected:  01/18/18 0000    Specimen:  Blood Updated:  01/18/18 0038     Troponin I <0.012 ng/mL     BNP [[id number]]  (Normal) Collected:  01/18/18 0000    Specimen:  Blood Updated:  01/18/18 0038     proBNP 34.7 pg/mL     Comprehensive Metabolic Panel [[id number]]  (Abnormal) Collected:  01/18/18 0000    Specimen:  Blood Updated:  01/18/18 0041     Glucose 366 (H) mg/dL      BUN 10 mg/dL      Creatinine 0.79 mg/dL      Sodium 138 mmol/L      Potassium 4.0 mmol/L      Chloride 96 mmol/L      CO2 29.0 mmol/L      Calcium 8.7 mg/dL      Total Protein 7.3 g/dL      Albumin 4.00 g/dL      ALT (SGPT) 36 U/L      AST (SGOT) 35 U/L      Alkaline Phosphatase 81 U/L      Total Bilirubin 0.3 mg/dL      eGFR Non  Amer 109  mL/min/1.73      Globulin 3.3 gm/dL      A/G Ratio 1.2 g/dL      BUN/Creatinine Ratio 12.7     Anion Gap 13.0 mmol/L     D-dimer, Quantitative [[id number]]  (Normal) Collected:  01/18/18 0000    Specimen:  Blood Updated:  01/18/18 0056     D-Dimer, Quantitative <270 ng/mL (FEU)     Narrative:       Dimer values <500 ng/ml FEU are FDA approved as aid in diagnosis of deep venous thrombosis and pulmonary embolism.  This test should not be used in an exclusion strategy with pretest probability alone.    A recent guideline regarding diagnosis for pulmonary thomboembolism recommends an adjusted exclusion criterion of age x 10 ng/ml FEU for patients >50 years of age (Lori Intern Med 2015; 163: 701-711).    Gold Top - SST [[id number]] Collected:  01/18/18 0000    Specimen:  Blood Updated:  01/18/18 0101     Extra Tube Hold for add-ons.      Auto resulted.       [place] Draw [[id number]] Collected:  01/18/18 0000    Specimen:  Blood Updated:  01/18/18 0101    Narrative:       The following orders were created for panel order [place] Draw.  Procedure                               Abnormality         Status                     ---------                               -----------         ------                     Light Blue Top[[id number]]                                   Final result               Green Top (Gel)[[id number]]                                  Final result               Lavender Top[[id number]]                                     Final result               Gold Top - SST[[id number]]                                   Final result                 Please view results for these tests on the individual orders.    Light Blue Top [[id number]] Collected:  01/18/18 0000    Specimen:  Blood Updated:  01/18/18 0101     Extra Tube hold for add-on      Auto resulted       Green Top (Gel) [[id number]] Collected:  01/18/18 0000    Specimen:  Blood Updated:  01/18/18 0101     Extra Tube Hold for add-ons.      Auto resulted.        Flaquita Top [[id number]] Collected:  01/18/18 0001    Specimen:  Blood Updated:  01/18/18 0101     Extra Tube hold for add-on      Auto resulted              Imaging Results (last 24 hours)     Procedure Component Value Units Date/Time    XR Chest 1 View [[id number]] Collected:  01/18/18 0005     Updated:  01/18/18 0025    Narrative:         Chest single view on  1/18/2018     CLINICAL INDICATION: Shortness of breath, chest pressure    COMPARISON: 1/6/2018    FINDINGS: The lungs are clear. Cardiac, hilar and mediastinal  contours are within normal limits. Pulmonary vascularity is  within normal limits. No bony abnormality is noted.      Impression:       No active disease.    Electronically signed by:  Pelon Anderson  1/18/2018 12:24 AM  CST Workstation: [email or web address]        I reviewed the patient's new clinical results.    ASSESSMENT AND PLAN: This is a 37 y.o. male with:    Active Hospital Problems (** Indicates Principal Problem)    Diagnosis Date Noted   • **Chest pain, rule out acute myocardial infarction [R07.9] 01/18/2018     Priority: High     -EKG in the ED revealed normal sinus rhythm with no ST segment changes  -Initial troponin the ED was negative; continue to monitor cardiac enzymes  -Monitoring cardiac activity with telemetry  -Change patient's statin therapy from pravastatin to atorvastatin; will need prescription at discharge  -Continue home antihypertensive therapy  -When necessary nitroglycerin     • Coronary artery disease involving native coronary artery of native heart [I25.10] 12/21/2017     Priority: High     -Continue antiplatelet therapy: Plavix  -Continue antihypertensive therapy: Metoprolol, Imdur, Norvasc  -Monitoring cardiac activity on telemetry  -Monitoring blood pressures per hospital protocol     • Mixed hyperlipidemia [E78.2] 12/13/2016     Priority: High     -Switch patient's statin therapy from pravastatin to atorvastatin; will need prescription upon discharge     • Class 3  obesity due to excess calories with serious comorbidity and body mass index (BMI) of 60.0 to 69.9 in adult [E66.09, Z68.44] 07/28/2016     Priority: High     -Discussed health patient secondary to obesity  -Consult to nutrition to provide information on dieting and calorie counting     • Type 2 diabetes mellitus with hyperglycemia, without long-term current use of insulin [E11.65] 12/21/2017     Priority: Medium     -Patient is currently on metformin because milligrams twice a day; patient was prescribed Invokana but has not initiated therapy at this time  -Patient's last hemoglobin A1c was not within therapeutic range; will obtain hemoglobin A1c  -We'll provide patient with sliding scale insulin and diabetic diet     • Essential hypertension [I10]      Priority: Medium     -Continue antihypertensive therapy  -Monitoring her blood pressure per hospital protocol     • Obstructive sleep apnea syndrome [G47.33] 02/17/2016     Priority: Medium     -Patient is compliant with CPAP and brought his own machine     • Candidal intertrigo [B37.2] 01/18/2018     Priority: Low     -Discussed appropriate hygiene  -Provided nystatin powder when necessary and one time dose of Diflucan        Resolved Hospital Problems    Diagnosis Date Noted Date Resolved   No resolved problems to display.       We will continue to monitor the patient's course and adjust our care accordingly.     DVT prophylaxis: Xarelto  Code status is Full Code  CARY # [id number], reviewed and consistent with patient reported medications.    Anticipated length of stay: 1 day     I discussed the patients findings and my recommendations with patient and primary care team.     Dr. Julito Herbert is the attending on record at time of admission; he is aware of the patient's status and agrees with the above history and physical.    Signature  Abi Nichols MD  [facility] Family Medicine Resident, PGYII        This document has been electronically signed by Abi  Lluvia Nichols MD on January 18, 2018 3:18 AM

## 2018-01-18 NOTE — PLAN OF CARE
Problem: Patient Care Overview (Adult)  Goal: Plan of Care Review  Outcome: Ongoing (interventions implemented as appropriate)    Goal: Adult Individualization and Mutuality  Outcome: Ongoing (interventions implemented as appropriate)    Goal: Discharge Needs Assessment  Outcome: Ongoing (interventions implemented as appropriate)      Problem: Acute Coronary Syndrome (ACS) (Adult)  Goal: Signs and Symptoms of Listed Potential Problems Will be Absent or Manageable (Acute Coronary Syndrome)  Outcome: Ongoing (interventions implemented as appropriate)

## 2018-01-18 NOTE — TELEPHONE ENCOUNTER
[facility] in [place] called to clarify a script for Novolog on this patient.     [phone]    Thank you.

## 2018-01-18 NOTE — ED PROVIDER NOTES
Subjective   HPI Comments: 37 years old morbidly obese male with history of hypertension, coronary artery disease status post stenting, multiple pulmonary embolisms in the past currently on anticoagulation, diabetes mellitus presented in the ER with 5 hours history of constant substernal chest pain which is sharp in nature with radiation to the left shoulder.  It is associated with mild to moderate shortness of breath.  Pain gets slightly better with nitroglycerin.    Patient is a 37 y.o. male presenting with chest pain.   History provided by:  Patient  Chest Pain   Pain location:  Substernal area  Pain quality: sharp    Pain radiates to:  L shoulder  Pain severity:  Moderate  Onset quality:  Gradual  Duration:  5 hours  Timing:  Constant  Progression:  Improving  Chronicity:  Recurrent  Context: at rest    Relieved by:  Nitroglycerin  Worsened by:  Nothing  Associated symptoms: nausea, palpitations and shortness of breath    Associated symptoms: no abdominal pain, no back pain, no claudication, no cough, no fatigue, no fever, no headache, no numbness, no syncope and no vomiting    Shortness of breath:     Severity:  Moderate    Onset quality:  Gradual    Duration:  5 hours    Timing:  Constant    Progression:  Unchanged  Risk factors: coronary artery disease, diabetes mellitus, male sex and obesity        Review of Systems   Constitutional: Negative for chills, fatigue and fever.   HENT: Negative for congestion, facial swelling, rhinorrhea and sinus pressure.    Eyes: Negative for photophobia and visual disturbance.   Respiratory: Positive for shortness of breath. Negative for cough, chest tightness and wheezing.    Cardiovascular: Positive for chest pain and palpitations. Negative for claudication and syncope.   Gastrointestinal: Positive for nausea. Negative for abdominal distention, abdominal pain, diarrhea and vomiting.   Endocrine: Negative for polyuria.   Genitourinary: Negative for flank pain.    Musculoskeletal: Negative for back pain, joint swelling and neck pain.   Skin: Negative for rash.   Neurological: Negative for seizures, numbness and headaches.   Hematological: Negative for adenopathy.   Psychiatric/Behavioral: Negative for agitation.       Past Medical History:   Diagnosis Date   • Chest pain    • Chronic back pain    • Coronary artery disease    • Diabetes mellitus    • GERD (gastroesophageal reflux disease)    • Hyperlipidemia    • Hypertension    • Morbid obesity    • Pulmonary embolism    • RLS (restless legs syndrome)    • Seizures    • Sleep apnea        Allergies   Allergen Reactions   • Glipizide Other (See Comments)     Hallucinations     • Risperdal [Risperidone] Other (See Comments)     Slurred speech   • Tramadol Other (See Comments)     seizures   • Reglan [Metoclopramide] Anxiety       Past Surgical History:   Procedure Laterality Date   • ADENOIDECTOMY     • CARDIAC CATHETERIZATION N/A 3/15/2017    Procedure: Left Heart Cath;  Surgeon: Edwige Briceno MD;  Location: [facility] CATH INVASIVE LOCATION;  Service:    • CARDIAC CATHETERIZATION N/A 3/15/2017    Procedure: Stent SOPHIA coronary;  Surgeon: Edwige Briceno MD;  Location: [facility] CATH INVASIVE LOCATION;  Service:    • CHOLECYSTECTOMY     • CORONARY ANGIOPLASTY WITH STENT PLACEMENT     • [place] RT/LT HEART CATHETERS N/A 3/15/2017    Procedure: Percutaneous Coronary Intervention;  Surgeon: Edwige Briceno MD;  Location: [facility] CATH INVASIVE LOCATION;  Service: Cardiovascular   • TONSILLECTOMY     • TRANSESOPHAGEAL ECHOCARDIOGRAM (MONICA)         Family History   Problem Relation Age of Onset   • Heart disease Mother    • Hypertension Mother    • Hyperlipidemia Mother    • Coronary artery disease Mother    • Cancer Paternal Grandfather        Social History     Social History   • Marital status:      Spouse name: Cori   • Number of children: 0   • Years of education: N/A     Occupational History   • Disabled      Social History Main Topics    • Smoking status: Former Smoker     Quit date: 1997   • Smokeless tobacco: Current User     Types: Snuff   • Alcohol use No   • Drug use: No   • Sexual activity: Yes     Partners: Female     Birth control/ protection: None     Other Topics Concern   • None     Social History Narrative   • None           Objective   Physical Exam   Constitutional: He is oriented to person, place, and time. He appears well-developed and well-nourished. No distress.   HENT:   Head: Normocephalic and atraumatic.   Eyes: Conjunctivae and EOM are normal. Pupils are equal, round, and reactive to light.   Neck: Normal range of motion. Neck supple.   Cardiovascular: Normal rate, regular rhythm and normal heart sounds.    Pulmonary/Chest: Effort normal and breath sounds normal. No respiratory distress. He has no wheezes.   Abdominal: Soft. Bowel sounds are normal. He exhibits no distension. There is no tenderness. There is no rebound and no guarding.   Musculoskeletal: Normal range of motion. He exhibits no edema or deformity.   Neurological: He is alert and oriented to person, place, and time.   Skin: Skin is warm and dry. No rash noted.   Psychiatric: He has a normal mood and affect.   Nursing note and vitals reviewed.      ECG 12 Lead    Date/Time: 1/18/2018 1:34 AM  Performed by: ERIC HARRY  Authorized by: ERIC HARRY   Interpreted by physician  Rhythm: sinus rhythm  Rate: normal  BPM: 83  QRS axis: normal  Conduction: conduction normal  Clinical impression: abnormal ECG  Comments: Nonspecific ST changes.               ED Course  ED Course   Comment By Time   EKG did not show any acute ischemic changes.  He is given breathing treatment and pain medication along with nitroglycerin patch.  He already have received full dose of aspirin.  Workup is grossly negative including d-dimer isn't first troponin.  With his multiple risk factors patient would be admitted for trending cardiac enzymes.  I have D/w  and patient is accepted  on resident service. Simon Roldan MD 01/18 0132                Labs Reviewed   COMPREHENSIVE METABOLIC PANEL - Abnormal; Notable for the following:        Result Value    Glucose 366 (*)     All other components within normal limits   CBC WITH AUTO DIFFERENTIAL - Abnormal; Notable for the following:     Hemoglobin 13.3 (*)     Immature Grans % 0.9 (*)     Immature Grans, Absolute 0.05 (*)     All other components within normal limits   HEMOGLOBIN A1C - Abnormal; Notable for the following:     Hemoglobin A1C 10.0 (*)     All other components within normal limits   POCT GLUCOSE FINGERSTICK - Abnormal; Notable for the following:     Glucose 209 (*)     All other components within normal limits   POCT GLUCOSE FINGERSTICK - Abnormal; Notable for the following:     Glucose 288 (*)     All other components within normal limits   TROPONIN (IN-HOUSE) - Normal   BNP (IN-HOUSE) - Normal   D-DIMER, QUANTITATIVE - Normal    Narrative:     Dimer values <500 ng/ml FEU are FDA approved as aid in diagnosis of deep venous thrombosis and pulmonary embolism.  This test should not be used in an exclusion strategy with pretest probability alone.    A recent guideline regarding diagnosis for pulmonary thomboembolism recommends an adjusted exclusion criterion of age x 10 ng/ml FEU for patients >50 years of age (Lori Intern Med 2015; 163: 701-711).   TROPONIN (IN-HOUSE) - Normal   TROPONIN (IN-HOUSE) - Normal   RAINBOW DRAW    Narrative:     The following orders were created for panel order [place] Draw.  Procedure                               Abnormality         Status                     ---------                               -----------         ------                     Light Blue Top[[id number]]                                   Final result               Green Top (Gel)[[id number]]                                  Final result               Lavender Top[[id number]]                                     Final result               Gold Top  - SST[[id number]]                                   Final result                 Please view results for these tests on the individual orders.   POCT GLUCOSE FINGERSTICK   POCT GLUCOSE FINGERSTICK   CBC AND DIFFERENTIAL    Narrative:     The following orders were created for panel order CBC & Differential.  Procedure                               Abnormality         Status                     ---------                               -----------         ------                     CBC Auto Differential[[id number]]        Abnormal            Final result                 Please view results for these tests on the individual orders.   LIGHT BLUE TOP   GREEN TOP   LAVENDER TOP   GOLD TOP - SST   EXTRA TUBES    Narrative:     The following orders were created for panel order Extra Tubes.  Procedure                               Abnormality         Status                     ---------                               -----------         ------                     Lavender Top[[id number]]                                     Final result                 Please view results for these tests on the individual orders.   LAVENDER TOP       Xr Chest 2 View    Result Date: 1/6/2018  Narrative: Chest 2 view on  1/6/2018 CLINICAL INDICATION: Shortness of breath COMPARISON: 12/20/2017 FINDINGS: The lungs are clear. Cardiac, hilar and mediastinal contours are within normal limits. Pulmonary vascularity is within normal limits. A few old left rib fractures are noted. No acute bony abnormality is noted.     Impression: No active disease. Electronically signed by:  Pelon Anderson  1/6/2018 12:59 AM CST Workstation: [id number]    Xr Chest 1 View    Result Date: 1/18/2018  Narrative: Chest single view on  1/18/2018 CLINICAL INDICATION: Shortness of breath, chest pressure COMPARISON: 1/6/2018 FINDINGS: The lungs are clear. Cardiac, hilar and mediastinal contours are within normal limits. Pulmonary vascularity is within normal limits. No bony  abnormality is noted.     Impression: No active disease. Electronically signed by:  Pelon Anderson  1/18/2018 12:24 AM CST Workstation: [id number]    Ct Angiogram Chest With Contrast    Result Date: 1/2/2018  Narrative: CT angiogram chest with contrast on 1/2/2018 CLINICAL INDICATION: Chest pain, history of pulmonary embolus TECHNIQUE: Multiple axial images are obtained throughout the chest following the administration of IV contrast.  Computer generated 3D reconstructions/MIPS were performed. This study was performed with techniques to keep radiation doses as low as reasonably achievable, (ALARA). Total DLP is 4236.8 mGy*cm. COMPARISON: 12/12/2017 FINDINGS: Bolus timing limits evaluation for pulmonary embolus. There is no aortic aneurysm or dissection. No large or central filling defects are noted in the pulmonary arteries. Would be difficult to fully exclude small peripheral pulmonary emboli on this examination. There is no pleural or pericardial effusion. The patient is status post cholecystectomy. Limited visualized upper abdomen is unremarkable. There is no thoracic adenopathy. The lungs are clear. No bony abnormality is noted.     Impression: 1. Bolus timing limits evaluation for pulmonary embolus with no evidence of a large or central pulmonary embolus but cannot fully exclude small peripheral pulmonary emboli on this exam. 2. Otherwise no acute abnormality. Electronically signed by:  Pelon Anderson  1/2/2018 1:27 AM CST Workstation: [id number]        MDM    Final diagnoses:   Chest pain, rule out acute myocardial infarction   Shortness of breath            Simon Roldan MD  01/19/18 1948

## 2018-01-18 NOTE — DISCHARGE SUMMARY
DISCHARGE SUMMARY    PATIENT NAME: Yordy Hansen       PHYSICIAN: Willy Puckett MD  : 1980  MRN: [id number]    ADMITTED: 2018     DISCHARGED: 18    ADMISSION DIAGNOSES:  Active Hospital Problems (** Indicates Principal Problem)    Diagnosis Date Noted   • Candidal intertrigo [B37.2] 2018   • Coronary artery disease involving native coronary artery of native heart [I25.10] 2017   • Type 2 diabetes mellitus with hyperglycemia, without long-term current use of insulin [E11.65] 2017   • Essential hypertension [I10]    • Mixed hyperlipidemia [E78.2] 2016   • Class 3 obesity due to excess calories with serious comorbidity and body mass index (BMI) of 60.0 to 69.9 in adult [E66.09, Z68.44] 2016   • Obstructive sleep apnea syndrome [G47.33] 2016      Resolved Hospital Problems    Diagnosis Date Noted Date Resolved   • **Chest pain, rule out acute myocardial infarction [R07.9] 2018     DISCHARGE DIAGNOSES:   Active Hospital Problems (** Indicates Principal Problem)    Diagnosis Date Noted   • Candidal intertrigo [B37.2] 2018   • Coronary artery disease involving native coronary artery of native heart [I25.10] 2017   • Type 2 diabetes mellitus with hyperglycemia, without long-term current use of insulin [E11.65] 2017   • Essential hypertension [I10]    • Mixed hyperlipidemia [E78.2] 2016   • Class 3 obesity due to excess calories with serious comorbidity and body mass index (BMI) of 60.0 to 69.9 in adult [E66.09, Z68.44] 2016   • Obstructive sleep apnea syndrome [G47.33] 2016      Resolved Hospital Problems    Diagnosis Date Noted Date Resolved   • **Chest pain, rule out acute myocardial infarction [R07.9] 2018       SERVICE: Family Medicine. Attending Dr. Herbert, Resident Willy Puckett MD    CONSULTS:   Consult Orders (all)     Start     Ordered    18 1101  Inpatient Consult to  "Nutrition  Once,   Status:  Canceled     Comments:  Diabetic diet education   Provider:  (Not yet assigned)    01/18/18 1100    01/18/18 1101  Inpatient Consult to Case Management   Once     Provider:  (Not yet assigned)    01/18/18 1102    01/18/18 0336  Inpatient consult to Nutrition  Once     Provider:  (Not yet assigned)    01/18/18 0335    01/18/18 0133  Family Practice - Resident (on-call MD unless specified)  Once     Specialty:  Family Medicine  Provider:  Abi Nichols MD    01/18/18 0133            HISTORY OF PRESENT ILLNESS:   Yordy Hansen is a 37 y.o. morbidly obese male with a concurrent medical history of coronary artery disease status post stenting of a in-stent restenosis of the LAD in March 2017 who presents with midsternal, intermittent, stabbing pressure-like chest pain that began approximately 6 hours ago while the patient was sitting seated and watching TV. Pain occasionally radiates to left shoulder and clavicle. Pain was relieved temporarily with \"a couple of nitros\", sublingually, and rest. No aggravating factors. Associated symptoms include dyspnea, headache, and nausea.      Meals consisted of  BoyRD with tomato sauce, sausage slices, and biscuits. The night prior patient had fried chicken strips and potato fries. Discussed the possibility of GERD contributing to patient's chest pain.  Given patient's cardiac risk factors, patient was admitted overnight for observation.       HOSPITAL COURSE:  Patient was placed in observation for chest pain rule out.  Three sets of cardiac enzymes were performed which came back within normal limits.  Patient stated during the day that his chest pain had been getting better and that he thought it might have been GERD related.  Patient did get a hemoglobin A1C performed which showed a level of 10.  Did discuss with the patient about the need to go on insulin and patient agreed.  Did provide diabetic education for the patient " and did have the nurses teach the patient how to draw up insulin and to inject himself.  Patient would be discharged with novolog 11 units with each meal and levemir 33 units at night.  Patient was discharged in stable condition and will follow up with his PCP.      DISCHARGE CONDITION:   stable    DISPOSITION:  Home or Self Care    DISCHARGE MEDICATIONS   Yordy Hansen   Home Medication Instructions ORCKY:[id number]    Printed on:01/18/18 9070   Medication Information                      albuterol (ACCUNEB) 1.25 MG/3ML nebulizer solution  Take 3 mL by nebulization Every 6 (Six) Hours As Needed for Wheezing.             albuterol (PROVENTIL HFA;VENTOLIN HFA) 108 (90 BASE) MCG/ACT inhaler  Inhale 2 puffs Every 4 (Four) Hours As Needed for Wheezing.             amLODIPine (NORVASC) 10 MG tablet  Take 1 tablet by mouth Every Night.             clopidogrel (PLAVIX) 75 MG tablet  Take 1 tablet by mouth Daily.             HYDROcodone-acetaminophen (NORCO)  MG per tablet  Take 1 tablet by mouth Every 6 (Six) Hours As Needed for Moderate Pain .             insulin aspart (novoLOG) 100 UNIT/ML injection  Inject 11 Units under the skin 3 (Three) Times a Day Before Meals.             insulin detemir (LEVEMIR) 100 UNIT/ML injection  Inject 32 Units under the skin Every Night.             isosorbide mononitrate (IMDUR) 120 MG 24 hr tablet  Take 120 mg by mouth Daily.             lisinopril (PRINIVIL,ZESTRIL) 40 MG tablet  Take 1 tablet by mouth Every Morning.             metFORMIN (GLUCOPHAGE) 1000 MG tablet  Take 1 tablet by mouth 2 (Two) Times a Day With Meals.             metoprolol succinate XL (TOPROL XL) 200 MG 24 hr tablet  Take 1 tablet by mouth Daily.             MICROLET LANCETS misc  USE TO TEST BLOOD SUGAR EVERY DAY AND AS NEEDED             nitroglycerin (NITROSTAT) 0.4 MG SL tablet  Place 1 tablet under the tongue Every 5 (Five) Minutes As Needed for Chest Pain. Take no more than 3 doses in 15  minutes.             ondansetron ODT (ZOFRAN-ODT) 4 MG disintegrating tablet  Take 1 tablet by mouth Every 6 (Six) Hours As Needed for Nausea or Vomiting.             pantoprazole (PROTONIX) 40 MG EC tablet  Take 1 tablet by mouth Every Night.             pramipexole (MIRAPEX) 1 MG tablet  Take 2 tablets by mouth Every Night. Taking 2mg daily             pravastatin (PRAVACHOL) 80 MG tablet  Take 1 tablet by mouth Every Night.             ranolazine (RANEXA) 1000 MG 12 hr tablet  Take 1 tablet by mouth Every 12 (Twelve) Hours.             rivaroxaban (XARELTO) 20 MG tablet  Take 20 mg by mouth Daily.             sertraline (ZOLOFT) 25 MG tablet  Take 1 tablet by mouth Daily.             traZODone (DESYREL) 300 MG tablet  Take 1 tablet by mouth Every Night.                 INSTRUCTIONS:  Activity:   Activity Instructions     Activity as Tolerated                   Diet:   Diet Instructions     Advance Diet As Tolerated                   Special instructions: Patient instructed to call MD or return to ED with worsening shortness of breath, chest pain, fever greater than 100.4 degrees F or any other medical concerns..    FOLLOW UP:   Additional Instructions for the Follow-ups that You Need to Schedule     Discharge Follow-up with PCP    As directed    Follow Up Details:  in one week                 Follow-up Information     Follow up with LALA [facility]ajas .    Specialty:  Nurse Practitioner    Why:  in one week    Contact information:    [address] [place] [id number] [phone]            PENDING TEST RESULTS AT DISCHARGE      Time: Discharge 45 min    Dr. Herbert is the attending at time of discharge, is aware of the patient's status and agrees with the above discharge summary.          This document has been electronically signed by Willy Puckett MD on January 18, 2018 2:22 PM

## 2018-01-18 NOTE — H&P
HISTORY AND PHYSICAL  NAME: Yordy Hansen  : 1980  MRN: [id number]    DATE OF ADMISSION: 18    DATE & TIME SEEN: 18 10:11 AM    PCP: LALA Barajas    CODE STATUS: Full Code    CHIEF COMPLAINT Chest pain    HPI:  Yordy Hansen is a 37 y.o. male who presents with dull and substernal chest pain.     CONCURRENT MEDICAL HISTORY:  Past Medical History:   Diagnosis Date   • Chest pain    • Chronic back pain    • Coronary artery disease    • Diabetes mellitus    • GERD (gastroesophageal reflux disease)    • Hyperlipidemia    • Hypertension    • Morbid obesity    • Pulmonary embolism    • RLS (restless legs syndrome)    • Seizures    • Sleep apnea        PAST SURGICAL HISTORY:  Past Surgical History:   Procedure Laterality Date   • ADENOIDECTOMY     • CARDIAC CATHETERIZATION N/A 3/15/2017    Procedure: Left Heart Cath;  Surgeon: Edwige Briceno MD;  Location: [facility] INVASIVE LOCATION;  Service:    • CARDIAC CATHETERIZATION N/A 3/15/2017    Procedure: Stent SOPHIA coronary;  Surgeon: Edwige Briceno MD;  Location: [facility] CATH INVASIVE LOCATION;  Service:    • CHOLECYSTECTOMY     • CORONARY ANGIOPLASTY WITH STENT PLACEMENT     • CT RT/LT HEART CATHETERS N/A 3/15/2017    Procedure: Percutaneous Coronary Intervention;  Surgeon: Edwige Briceno MD;  Location: [facility] CATH INVASIVE LOCATION;  Service: Cardiovascular   • TONSILLECTOMY     • TRANSESOPHAGEAL ECHOCARDIOGRAM (MONICA)         FAMILY HISTORY:  Family History   Problem Relation Age of Onset   • Heart disease Mother    • Hypertension Mother    • Hyperlipidemia Mother    • Coronary artery disease Mother    • Cancer Paternal Grandfather         SOCIAL HISTORY:  Social History     Social History   • Marital status:      Spouse name: Cori   • Number of children: 0   • Years of education: N/A     Occupational History   • Disabled      Social History Main Topics   • Smoking status: Former Smoker     Quit date:    • Smokeless tobacco:  Current User     Types: Snuff   • Alcohol use No   • Drug use: No   • Sexual activity: Yes     Partners: Female     Birth control/ protection: None     Other Topics Concern   • Not on file     Social History Narrative   • No narrative on file       HOME MEDICATIONS:ALLERGIES:    Prior to Admission medications    Medication Sig Start Date End Date Taking? Authorizing Provider   albuterol (ACCUNEB) 1.25 MG/3ML nebulizer solution Take 3 mL by nebulization Every 6 (Six) Hours As Needed for Wheezing. 11/15/17   LALA Wick   albuterol (PROVENTIL HFA;VENTOLIN HFA) 108 (90 BASE) MCG/ACT inhaler Inhale 2 puffs Every 4 (Four) Hours As Needed for Wheezing.    Historical Provider, MD   amLODIPine (NORVASC) 10 MG tablet Take 1 tablet by mouth Every Night. 11/10/17   Earnest Perez MD   Canagliflozin 100 MG tablet Take 1 tablet by mouth Every Morning Before Breakfast. 12/19/17   LALA Barajas   clopidogrel (PLAVIX) 75 MG tablet Take 1 tablet by mouth Daily. 11/10/17   Earnest Perez MD   HYDROcodone-acetaminophen (NORCO)  MG per tablet Take 1 tablet by mouth Every 6 (Six) Hours As Needed for Moderate Pain . 1/6/18   Red Loya MD   isosorbide mononitrate (IMDUR) 120 MG 24 hr tablet Take 120 mg by mouth Daily.    Historical Provider, MD   lisinopril (PRINIVIL,ZESTRIL) 40 MG tablet Take 1 tablet by mouth Every Morning. 11/10/17   Earnest Perez MD   metFORMIN (GLUCOPHAGE) 1000 MG tablet TWICE A DAY    Historical Provider, MD   metoprolol succinate XL (TOPROL XL) 200 MG 24 hr tablet Take 1 tablet by mouth Daily. 1/4/18   Antony Jenkins MD PhD   MICROLET LANCETS misc USE TO TEST BLOOD SUGAR EVERY DAY AND AS NEEDED 6/20/16   Historical Provider, MD   nitroglycerin (NITROSTAT) 0.4 MG SL tablet Place 1 tablet under the tongue Every 5 (Five) Minutes As Needed for Chest Pain. Take no more than 3 doses in 15 minutes. 11/10/17   Earnest Perez MD   ondansetron ODT (ZOFRAN-ODT) 4 MG  disintegrating tablet Take 1 tablet by mouth Every 6 (Six) Hours As Needed for Nausea or Vomiting. 1/6/18   Red Loya MD   pantoprazole (PROTONIX) 40 MG EC tablet Take 1 tablet by mouth Every Night. 11/10/17   Earnest Perez MD   pramipexole (MIRAPEX) 1 MG tablet Take 2 tablets by mouth Every Night. Taking 2mg daily 11/10/17   Earnest Perez MD   pravastatin (PRAVACHOL) 80 MG tablet Take 1 tablet by mouth Every Night. 11/10/17   Earnest Perez MD   ranolazine (RANEXA) 1000 MG 12 hr tablet Take 1 tablet by mouth Every 12 (Twelve) Hours. 11/10/17   Earnest Perez MD   rivaroxaban (XARELTO) 20 MG tablet Take 20 mg by mouth Daily.    Historical Provider, MD   sertraline (ZOLOFT) 25 MG tablet Take 1 tablet by mouth Daily. 11/10/17   Earnest Perez MD   traZODone (DESYREL) 300 MG tablet Take 1 tablet by mouth Every Night. 11/10/17   Earnest Perez MD           Glipizide; Risperdal [risperidone]; Tramadol; and Reglan [metoclopramide]    REVIEW OF SYSTEMS  Review of Systems   Constitutional: Positive for activity change and appetite change. Negative for fatigue and fever.   HENT: Negative for ear pain and sore throat.    Eyes: Negative for pain and visual disturbance.   Respiratory: Positive for chest tightness. Negative for cough and shortness of breath.    Cardiovascular: Positive for chest pain. Negative for palpitations.   Gastrointestinal: Negative for abdominal pain and nausea.   Endocrine: Negative for cold intolerance and heat intolerance.   Genitourinary: Negative for difficulty urinating and dysuria.   Musculoskeletal: Negative for arthralgias and gait problem.   Skin: Positive for rash and wound. Negative for color change.   Neurological: Negative for dizziness, weakness and headaches.   Hematological: Negative for adenopathy. Does not bruise/bleed easily.   Psychiatric/Behavioral: Negative for agitation, confusion and sleep disturbance.       PHYSICAL EXAM:  Temp:  [97.5 °F  (36.4 °C)-99.1 °F (37.3 °C)] 97.5 °F (36.4 °C)  Heart Rate:  [78-94] 84  Resp:  [20-24] 20  BP: (129-178)/(59-86) 129/59  Body mass index is 65.69 kg/(m^2).  Physical Exam   Constitutional: He is oriented to person, place, and time. He appears well-developed and well-nourished.   HENT:   Head: Normocephalic and atraumatic.   Right Ear: Tympanic membrane and ear canal normal.   Left Ear: Tympanic membrane and ear canal normal.   Mouth/Throat: Oropharynx is clear and moist. Dental caries present.   Eyes: Conjunctivae and EOM are normal. Pupils are equal, round, and reactive to light.   Amblyopia of right eye.    Neck: Normal range of motion. Neck supple.   Cardiovascular: Normal rate, regular rhythm, normal heart sounds and intact distal pulses.    Pulmonary/Chest: Effort normal. He has decreased breath sounds (bilaterally). He has no wheezes.   Abdominal: Soft. Bowel sounds are normal.   Musculoskeletal: Normal range of motion. He exhibits no edema or tenderness.   Neurological: He is alert and oriented to person, place, and time.   Skin: Skin is warm. Rash noted. He is not diaphoretic.        Psychiatric: He has a normal mood and affect. His behavior is normal. Judgment and thought content normal.   Nursing note and vitals reviewed.      DIAGNOSTIC DATA:   Lab Results (last 24 hours)     Procedure Component Value Units Date/Time    CBC & Differential [[id number]] Collected:  01/18/18 0001    Specimen:  Blood Updated:  01/18/18 0013    Narrative:       The following orders were created for panel order CBC & Differential.  Procedure                               Abnormality         Status                     ---------                               -----------         ------                     CBC Auto Differential[[id number]]        Abnormal            Final result                 Please view results for these tests on the individual orders.    CBC Auto Differential [[id number]]  (Abnormal) Collected:  01/18/18 0001     Specimen:  Blood Updated:  01/18/18 0013     WBC 5.78 10*3/mm3      RBC 4.82 10*6/mm3      Hemoglobin 13.3 (L) g/dL      Hematocrit 39.1 %      MCV 81.1 fL      MCH 27.6 pg      MCHC 34.0 g/dL      RDW 14.3 %      RDW-SD 42.2 fl      MPV 9.1 fL      Platelets 167 10*3/mm3      Neutrophil % 62.6 %      Lymphocyte % 22.5 %      Monocyte % 8.8 %      Eosinophil % 5.0 %      Basophil % 0.2 %      Immature Grans % 0.9 (H) %      Neutrophils, Absolute 3.62 10*3/mm3      Lymphocytes, Absolute 1.30 10*3/mm3      Monocytes, Absolute 0.51 10*3/mm3      Eosinophils, Absolute 0.29 10*3/mm3      Basophils, Absolute 0.01 10*3/mm3      Immature Grans, Absolute 0.05 (H) 10*3/mm3     Troponin [[id number]]  (Normal) Collected:  01/18/18 0000    Specimen:  Blood Updated:  01/18/18 0038     Troponin I <0.012 ng/mL     BNP [[id number]]  (Normal) Collected:  01/18/18 0000    Specimen:  Blood Updated:  01/18/18 0038     proBNP 34.7 pg/mL     Comprehensive Metabolic Panel [[id number]]  (Abnormal) Collected:  01/18/18 0000    Specimen:  Blood Updated:  01/18/18 0041     Glucose 366 (H) mg/dL      BUN 10 mg/dL      Creatinine 0.79 mg/dL      Sodium 138 mmol/L      Potassium 4.0 mmol/L      Chloride 96 mmol/L      CO2 29.0 mmol/L      Calcium 8.7 mg/dL      Total Protein 7.3 g/dL      Albumin 4.00 g/dL      ALT (SGPT) 36 U/L      AST (SGOT) 35 U/L      Alkaline Phosphatase 81 U/L      Total Bilirubin 0.3 mg/dL      eGFR Non African Amer 109 mL/min/1.73      Globulin 3.3 gm/dL      A/G Ratio 1.2 g/dL      BUN/Creatinine Ratio 12.7     Anion Gap 13.0 mmol/L     D-dimer, Quantitative [[id number]]  (Normal) Collected:  01/18/18 0000    Specimen:  Blood Updated:  01/18/18 0056     D-Dimer, Quantitative <270 ng/mL (FEU)     Narrative:       Dimer values <500 ng/ml FEU are FDA approved as aid in diagnosis of deep venous thrombosis and pulmonary embolism.  This test should not be used in an exclusion strategy with pretest probability alone.    A recent  guideline regarding diagnosis for pulmonary thomboembolism recommends an adjusted exclusion criterion of age x 10 ng/ml FEU for patients >50 years of age (Lori Intern Med 2015; 163: 701-711).    Gold Top - SST [[id number]] Collected:  01/18/18 0000    Specimen:  Blood Updated:  01/18/18 0101     Extra Tube Hold for add-ons.      Auto resulted.       [place] Draw [[id number]] Collected:  01/18/18 0000    Specimen:  Blood Updated:  01/18/18 0101    Narrative:       The following orders were created for panel order [place] Draw.  Procedure                               Abnormality         Status                     ---------                               -----------         ------                     Light Blue Top[[id number]]                                   Final result               Green Top (Gel)[[id number]]                                  Final result               Lavender Top[[id number]]                                     Final result               Gold Top - SST[[id number]]                                   Final result                 Please view results for these tests on the individual orders.    Light Blue Top [[id number]] Collected:  01/18/18 0000    Specimen:  Blood Updated:  01/18/18 0101     Extra Tube hold for add-on      Auto resulted       Green Top (Gel) [[id number]] Collected:  01/18/18 0000    Specimen:  Blood Updated:  01/18/18 0101     Extra Tube Hold for add-ons.      Auto resulted.       Lavender Top [[id number]] Collected:  01/18/18 0001    Specimen:  Blood Updated:  01/18/18 0101     Extra Tube hold for add-on      Auto resulted       Extra Tubes [[id number]] Collected:  01/18/18 0342    Specimen:  Blood from Blood, Venous Line Updated:  01/18/18 0446    Narrative:       The following orders were created for panel order Extra Tubes.  Procedure                               Abnormality         Status                     ---------                               -----------         ------                      Lavender Top[[id number]]                                     Final result                 Please view results for these tests on the individual orders.    Lavender Top [[id number]] Collected:  01/18/18 0342    Specimen:  Blood Updated:  01/18/18 0446     Extra Tube hold for add-on      Auto resulted       Hemoglobin A1c [[id number]]  (Abnormal) Collected:  01/18/18 0001    Specimen:  Blood Updated:  01/18/18 0551     Hemoglobin A1C 10.0 (H) %     POC Glucose Once [[id number]]  (Abnormal) Collected:  01/18/18 0619    Specimen:  Blood Updated:  01/18/18 0639     Glucose 209 (H) mg/dL       RN NotifiedMeter: [id number]: [id number] CHESNEL LULI       Troponin [[id number]]  (Normal) Collected:  01/18/18 0551    Specimen:  Blood Updated:  01/18/18 0658     Troponin I <0.012 ng/mL            Imaging Results (last 24 hours)     Procedure Component Value Units Date/Time    XR Chest 1 View [[id number]] Collected:  01/18/18 0005     Updated:  01/18/18 0025    Narrative:         Chest single view on  1/18/2018     CLINICAL INDICATION: Shortness of breath, chest pressure    COMPARISON: 1/6/2018    FINDINGS: The lungs are clear. Cardiac, hilar and mediastinal  contours are within normal limits. Pulmonary vascularity is  within normal limits. No bony abnormality is noted.      Impression:       No active disease.    Electronically signed by:  Pelon Anderson  1/18/2018 12:24 AM  [facility] Workstation: -[email or web address]          EKG: NSR with no acute ST or T changes    I reviewed the patient's new clinical results.    ASSESSMENT AND PLAN: This is a 37 y.o. male with:    Active Hospital Problems (** Indicates Principal Problem)    Diagnosis Date Noted   • **Chest pain, rule out acute myocardial infarction [R07.9] 01/18/2018     -EKG in the ED revealed normal sinus rhythm with no ST segment changes  -Initial troponin the ED was negative; continue to monitor cardiac enzymes  -Monitoring cardiac activity with telemetry  -Change  patient's statin therapy from pravastatin to atorvastatin; will need prescription at discharge  -Continue home antihypertensive therapy  -When necessary nitroglycerin     • Candidal intertrigo [B37.2] 01/18/2018     -Discussed appropriate hygiene  -Provided nystatin powder when necessary and one time dose of Diflucan     • Coronary artery disease involving native coronary artery of native heart [I25.10] 12/21/2017     -Continue antiplatelet therapy: Plavix  -Continue antihypertensive therapy: Metoprolol, Imdur, Norvasc  -Monitoring cardiac activity on telemetry  -Monitoring blood pressures per hospital protocol     • Type 2 diabetes mellitus with hyperglycemia, without long-term current use of insulin [E11.65] 12/21/2017     -Patient is currently on metformin because milligrams twice a day; patient was prescribed Invokana but has not initiated therapy at this time  -Patient's last hemoglobin A1c was not within therapeutic range; will obtain hemoglobin A1c  -We'll provide patient with sliding scale insulin and diabetic diet     • Essential hypertension [I10]      -Continue antihypertensive therapy  -Monitoring her blood pressure per hospital protocol     • Mixed hyperlipidemia [E78.2] 12/13/2016     -Switch patient's statin therapy from pravastatin to atorvastatin; will need prescription upon discharge     • Class 3 obesity due to excess calories with serious comorbidity and body mass index (BMI) of 60.0 to 69.9 in adult [E66.09, Z68.44] 07/28/2016     -Discussed health patient secondary to obesity  -Consult to nutrition to provide information on dieting and calorie counting     • Obstructive sleep apnea syndrome [G47.33] 02/17/2016     -Patient is compliant with CPAP and brought his own machine        Resolved Hospital Problems    Diagnosis Date Noted Date Resolved   No resolved problems to display.     Code status is Full Code    Expected Length of Stay: 1-2 days    I discussed the patients findings and my  recommendations with patient, nursing staff and primary care team.     Keon is the attending on record at time of admission, is aware of the patient's status and agrees with the above history and physical.    I have examined the patient and reviewed the pertinent diagnostic data. I have discussed the case with the Family Medicine Resident and agree with the assessment and plan of care.    Julito Herbert DO           This document has been electronically signed by Julito Herbert DO on January 18, 2018 10:14 AM

## 2018-01-19 NOTE — TELEPHONE ENCOUNTER
Called the [facility] pharmacy in [place] and spoke with pharmacy staff.  Pharmacy staff indicated that the message was sent in error and was intended for the patient's PCP, LALA Barajas.     Signature  Abi Nichols MD  [facility] Family Medicine Resident, PGY II        This document has been electronically signed by Abi Nichols MD on January 18, 2018 11:38 PM

## 2018-01-22 RX ORDER — BLOOD-GLUCOSE METER
1 KIT MISCELLANEOUS AS NEEDED
Qty: 1 EACH | Refills: 0 | Status: SHIPPED | OUTPATIENT
Start: 2018-01-22 | End: 2020-09-29 | Stop reason: SDUPTHER

## 2018-01-22 RX ORDER — ISOSORBIDE MONONITRATE 120 MG/1
120 TABLET, EXTENDED RELEASE ORAL DAILY
Qty: 30 TABLET | Refills: 0 | Status: SHIPPED | OUTPATIENT
Start: 2018-01-22 | End: 2018-02-04 | Stop reason: SDUPTHER

## 2018-01-23 DIAGNOSIS — E11.65 TYPE 2 DIABETES MELLITUS WITH HYPERGLYCEMIA, WITHOUT LONG-TERM CURRENT USE OF INSULIN (HCC): Primary | ICD-10-CM

## 2018-01-25 ENCOUNTER — DOCUMENTATION (OUTPATIENT)
Dept: FAMILY MEDICINE CLINIC | Facility: CLINIC | Age: 38
End: 2018-01-25

## 2018-01-25 ENCOUNTER — OFFICE VISIT (OUTPATIENT)
Dept: FAMILY MEDICINE CLINIC | Facility: CLINIC | Age: 38
End: 2018-01-25

## 2018-01-25 VITALS
BODY MASS INDEX: 44.1 KG/M2 | DIASTOLIC BLOOD PRESSURE: 78 MMHG | TEMPERATURE: 98.8 F | HEART RATE: 72 BPM | HEIGHT: 71 IN | SYSTOLIC BLOOD PRESSURE: 156 MMHG | WEIGHT: 315 LBS | OXYGEN SATURATION: 95 %

## 2018-01-25 DIAGNOSIS — I27.82 OTHER CHRONIC PULMONARY EMBOLISM WITHOUT ACUTE COR PULMONALE (HCC): Chronic | ICD-10-CM

## 2018-01-25 DIAGNOSIS — I25.119 CORONARY ARTERY DISEASE INVOLVING NATIVE CORONARY ARTERY OF NATIVE HEART WITH ANGINA PECTORIS (HCC): ICD-10-CM

## 2018-01-25 DIAGNOSIS — IMO0001 CLASS 3 OBESITY DUE TO EXCESS CALORIES WITH SERIOUS COMORBIDITY AND BODY MASS INDEX (BMI) OF 60.0 TO 69.9 IN ADULT: ICD-10-CM

## 2018-01-25 DIAGNOSIS — E11.65 TYPE 2 DIABETES MELLITUS WITH HYPERGLYCEMIA, WITHOUT LONG-TERM CURRENT USE OF INSULIN (HCC): Primary | ICD-10-CM

## 2018-01-25 DIAGNOSIS — I10 ESSENTIAL HYPERTENSION: ICD-10-CM

## 2018-01-25 PROCEDURE — 99214 OFFICE O/P EST MOD 30 MIN: CPT | Performed by: NURSE PRACTITIONER

## 2018-01-25 RX ORDER — METOPROLOL SUCCINATE 100 MG/1
TABLET, EXTENDED RELEASE ORAL
COMMUNITY
Start: 2018-01-22 | End: 2018-01-25 | Stop reason: DRUGHIGH

## 2018-01-25 RX ORDER — SYRINGE AND NEEDLE,INSULIN,1ML 31GX15/64"
SYRINGE, EMPTY DISPOSABLE MISCELLANEOUS
COMMUNITY
Start: 2018-01-18 | End: 2020-09-29 | Stop reason: SDUPTHER

## 2018-01-25 NOTE — PROGRESS NOTES
"Chief Complaint   Patient presents with   • Coronary Artery Disease     f/u from ER        Subjective   Yordy Hansen is a 37 y.o. male presents to the office for hospital follow up and evaluation of T2DM.    [facility]   Cardiology:CAD   He is followed by Dr. Jenkins and LALA Vásquez- cardiology. Previously  followed by Dr. Boone. He had a cardiac stent placed in July 2016 and another  placed in 03/2017.     Recent PE 12/2017- was previously on xarelto, insurance would not cover and was  switched to eliquis. Followed by coumadin clinic. Now back on lifelong xarelto- if  not covered, will be placed on coumadin    T2DM- managed with metformin. recently started on levemir and novolog     chronic chest pain- prn nitro, renexa    factor 2 def/ chronic PE.- lifelong anticoagulant, plavix, xarelto     Hyperlipidemia- managed with pravastatin    RLS- managed with mirapex    Insomnia- managed with trazedone    GERD- managed with protonix    He is also a coumadin/anticoagualtion clinic patient. He was recently hospitalized through [facility] on 05/09/2017 and 12/2017 for multiple PE. He is now prescribed lifelong xarelto:    HPI   Patient presents for hospital f/u- d/c date 1/18/2018. He is accompanied by his wife who provides additional history.    Patient was unable to complete PCP ordered labs before being admitted to the hospital.    A1c from hospital labs: 10. Patient was started on 32 units levemir, and 11 units novolog before each meal/TID. He is tolerating his new insulin medications well and denies side effects. He brings with him his BG log which shows consistent fasting am BG of 200-300. Before meal BG readings are 200-300.     Hospital records indicate no evidence of new PE or MI.     Patient expresses interest in losing weight in order to d/c insulin requirement.     No acute complaints at this time. Breathing is much improved from previous office visit. Patient states \"I feel better than Karol felt in " "a long time\".    He is requesting an order be sent to [place] at the local health dept so that he may be seen by the dietician.    He is also requesting an order be sent to home health to have his stitches in his toe removed 10 days post administration date. Home health will evaluate on Monday 2/5/18    Also- patient states there is a mix up in his metoprolol- likely cause from increase in BP. He has 100 mg metoprolol. He states Dr. Jenkins increased his dose to 200 mg.   HPI  Family History   Problem Relation Age of Onset   • Heart disease Mother    • Hypertension Mother    • Hyperlipidemia Mother    • Coronary artery disease Mother    • Cancer Paternal Grandfather      Social History     Social History   • Marital status:      Spouse name: Cori   • Number of children: 0   • Years of education: N/A     Occupational History   • Disabled      Social History Main Topics   • Smoking status: Former Smoker     Quit date: 1997   • Smokeless tobacco: Current User     Types: Snuff   • Alcohol use No   • Drug use: No   • Sexual activity: Yes     Partners: Female     Birth control/ protection: None     Other Topics Concern   • Not on file     Social History Narrative       The following portions of the patient's history were reviewed and updated as appropriate: allergies, current medications, past family history, past medical history, past social history, past surgical history and problem list.    Review of Systems   Constitutional: Negative for activity change, appetite change, chills, fatigue, fever and unexpected weight change.   HENT: Negative.  Negative for congestion, drooling, facial swelling, postnasal drip, rhinorrhea, sinus pressure, sore throat, trouble swallowing and voice change.    Eyes: Negative.  Negative for photophobia, pain, discharge and visual disturbance.   Respiratory: Negative for apnea, cough, choking and wheezing.    Cardiovascular: Negative for chest pain, palpitations and leg swelling. " "  Gastrointestinal: Negative.  Negative for abdominal distention, abdominal pain, constipation, diarrhea, nausea and vomiting.   Endocrine: Negative for polydipsia, polyphagia and polyuria.   Genitourinary: Negative.  Negative for decreased urine volume, difficulty urinating and dysuria.   Musculoskeletal: Negative.  Negative for arthralgias, gait problem and myalgias.   Skin: Negative.  Negative for rash and wound.   Allergic/Immunologic: Negative.    Neurological: Negative.  Negative for dizziness, syncope, weakness, light-headedness, numbness and headaches.   Hematological: Negative.    Psychiatric/Behavioral: Negative.  Negative for confusion, decreased concentration and sleep disturbance. The patient is not nervous/anxious.      14 Point ROS completed with pertinent positives discussed. All other systems reviewed and are negative.       Objective   Encounter Vitals  /78  Pulse 72  Temp 98.8 °F (37.1 °C) (Tympanic)   Ht 180.3 cm (71\")  Wt (!) 216 kg (476 lb)  SpO2 95%  BMI 66.39 kg/m2  Vitals:    01/25/18 1307   BP: 156/78   Pulse: 72   Temp: 98.8 °F (37.1 °C)   TempSrc: Tympanic   SpO2: 95%   Weight: (!) 216 kg (476 lb)   Height: 180.3 cm (71\")       Physical Exam   Constitutional: He is oriented to person, place, and time. Vital signs are normal. He appears well-developed and well-nourished.  Non-toxic appearance.   Morbid obesity   HENT:   Head: Normocephalic and atraumatic.   Right Ear: Tympanic membrane, external ear and ear canal normal.   Left Ear: Tympanic membrane, external ear and ear canal normal.   Nose: Nose normal.   Mouth/Throat: Uvula is midline, oropharynx is clear and moist and mucous membranes are normal. No posterior oropharyngeal edema or posterior oropharyngeal erythema.   Eyes: Lids are normal. Pupils are equal, round, and reactive to light.   Neck: Trachea normal and normal range of motion. Neck supple. No thyroid mass present.   Cardiovascular: Normal rate, regular rhythm, S1 " "normal, S2 normal, normal heart sounds and intact distal pulses.  PMI is displaced.  Exam reveals no gallop and no friction rub.    No murmur heard.  Elevated BP   Pulmonary/Chest: Effort normal and breath sounds normal. No respiratory distress. He has no wheezes. He has no rhonchi. He has no rales.   Abdominal: Soft. Normal appearance and bowel sounds are normal. He exhibits no mass. There is no rebound and no guarding.   Musculoskeletal: Normal range of motion.   Lymphadenopathy:     He has no cervical adenopathy.   Neurological: He is alert and oriented to person, place, and time. He has normal strength. No cranial nerve deficit or sensory deficit. Gait normal.   Skin: Skin is warm, dry and intact. No rash noted.   Psychiatric: He has a normal mood and affect. His speech is normal and behavior is normal. Judgment and thought content normal. Cognition and memory are normal.   Nursing note and vitals reviewed.      Pertinent Labs  See below    Key Imaging/Tracings/POC Testing    Assessment and Medications  Problems Addressed this Visit        Cardiovascular and Mediastinum    Chronic pulmonary embolism (Chronic)    Essential hypertension    Relevant Medications    metoprolol succinate XL (TOPROL XL) 200 MG 24 hr tablet    Coronary artery disease involving native coronary artery of native heart    Relevant Medications    metoprolol succinate XL (TOPROL XL) 200 MG 24 hr tablet       Endocrine    Type 2 diabetes mellitus with hyperglycemia, without long-term current use of insulin - Primary    Relevant Medications    RELION INSULIN SYRINGE 31G X 15/64\" 0.5 ML misc    insulin aspart (novoLOG) 100 UNIT/ML injection    insulin detemir (LEVEMIR) 100 UNIT/ML injection       Other    Class 3 obesity due to excess calories with serious comorbidity and body mass index (BMI) of 60.0 to 69.9 in adult        Side effects of ordered medications discussed with patient.     Plan/Additional Notes/Instructions  Plan   1. Hospital " records reviewed  2. increase levemir to 35 units before bed. Notify office if AM FBG <120  3. Increase novolog to 15 units before meals. If pre-meal BG is <150, notify office before administering novolog. Patient MUST eat following this administration.  4. T2DM information provided to patient along with BG log to keep record of at home.  5. Will call to have home health evaluate foot and remove stitches  6. Will place referral for dietician at health dept  7. Re-order 200 mg metoprolol per cardiology  8. Recheck BP in 2 weeks  9. F/u in 2 weeks to recheck BG log     Follow-Up  Return in about 2 weeks (around 2/8/2018), or if symptoms worsen or fail to improve.    Patient/caregiver verbalizes understanding of all orders and instructions in this plan of care.           Admission on 01/17/2018, Discharged on 01/18/2018   Component Date Value Ref Range Status   • Troponin I 01/18/2018 <0.012  <=0.034 ng/mL Final   • Glucose 01/18/2018 366* 60 - 100 mg/dL Final   • BUN 01/18/2018 10  7 - 21 mg/dL Final   • Creatinine 01/18/2018 0.79  0.70 - 1.30 mg/dL Final   • Sodium 01/18/2018 138  137 - 145 mmol/L Final   • Potassium 01/18/2018 4.0  3.5 - 5.1 mmol/L Final   • Chloride 01/18/2018 96  95 - 110 mmol/L Final   • CO2 01/18/2018 29.0  22.0 - 31.0 mmol/L Final   • Calcium 01/18/2018 8.7  8.4 - 10.2 mg/dL Final   • Total Protein 01/18/2018 7.3  6.3 - 8.6 g/dL Final   • Albumin 01/18/2018 4.00  3.40 - 4.80 g/dL Final   • ALT (SGPT) 01/18/2018 36  21 - 72 U/L Final   • AST (SGOT) 01/18/2018 35  17 - 59 U/L Final   • Alkaline Phosphatase 01/18/2018 81  38 - 126 U/L Final   • Total Bilirubin 01/18/2018 0.3  0.2 - 1.3 mg/dL Final   • eGFR Non African Amer 01/18/2018 109  >60 mL/min/1.73 Final   • Globulin 01/18/2018 3.3  2.3 - 3.5 gm/dL Final   • A/G Ratio 01/18/2018 1.2  1.1 - 1.8 g/dL Final   • BUN/Creatinine Ratio 01/18/2018 12.7  7.0 - 25.0 Final   • Anion Gap 01/18/2018 13.0  5.0 - 15.0 mmol/L Final   • proBNP 01/18/2018  34.7  0.0 - 450.0 pg/mL Final   • Extra Tube 01/18/2018 hold for add-on   Final    Auto resulted   • Extra Tube 01/18/2018 Hold for add-ons.   Final    Auto resulted.   • Extra Tube 01/18/2018 hold for add-on   Final    Auto resulted   • Extra Tube 01/18/2018 Hold for add-ons.   Final    Auto resulted.   • WBC 01/18/2018 5.78  3.20 - 9.80 10*3/mm3 Final   • RBC 01/18/2018 4.82  4.37 - 5.74 10*6/mm3 Final   • Hemoglobin 01/18/2018 13.3* 13.7 - 17.3 g/dL Final   • Hematocrit 01/18/2018 39.1  39.0 - 49.0 % Final   • MCV 01/18/2018 81.1  80.0 - 98.0 fL Final   • MCH 01/18/2018 27.6  26.5 - 34.0 pg Final   • MCHC 01/18/2018 34.0  31.5 - 36.3 g/dL Final   • RDW 01/18/2018 14.3  11.5 - 14.5 % Final   • RDW-SD 01/18/2018 42.2  35.1 - 43.9 fl Final   • MPV 01/18/2018 9.1  8.0 - 12.0 fL Final   • Platelets 01/18/2018 167  150 - 450 10*3/mm3 Final   • Neutrophil % 01/18/2018 62.6  37.0 - 80.0 % Final   • Lymphocyte % 01/18/2018 22.5  10.0 - 50.0 % Final   • Monocyte % 01/18/2018 8.8  0.0 - 12.0 % Final   • Eosinophil % 01/18/2018 5.0  0.0 - 7.0 % Final   • Basophil % 01/18/2018 0.2  0.0 - 2.0 % Final   • Immature Grans % 01/18/2018 0.9* 0.0 - 0.5 % Final   • Neutrophils, Absolute 01/18/2018 3.62  2.00 - 8.60 10*3/mm3 Final   • Lymphocytes, Absolute 01/18/2018 1.30  0.60 - 4.20 10*3/mm3 Final   • Monocytes, Absolute 01/18/2018 0.51  0.00 - 0.90 10*3/mm3 Final   • Eosinophils, Absolute 01/18/2018 0.29  0.00 - 0.70 10*3/mm3 Final   • Basophils, Absolute 01/18/2018 0.01  0.00 - 0.20 10*3/mm3 Final   • Immature Grans, Absolute 01/18/2018 0.05* 0.00 - 0.02 10*3/mm3 Final   • D-Dimer, Quantitative 01/18/2018 <270  0 - 470 ng/mL (FEU) Final   • Troponin I 01/18/2018 <0.012  <=0.034 ng/mL Final   • Extra Tube 01/18/2018 hold for add-on   Final    Auto resulted   • Hemoglobin A1C 01/18/2018 10.0* 4 - 5.6 % Final   • Glucose 01/18/2018 209* 70 - 130 mg/dL Final    RN NotifiedMeter: [id number]: [id number] JUVE ROCKWELL   •  Troponin I 01/18/2018 <0.012  <=0.034 ng/mL Final   • Glucose 01/18/2018 288* 70 - 130 mg/dL Final    RN NotifiedMeter: [id number]: [id number] LISA ELIAS   Admission on 01/06/2018, Discharged on 01/06/2018   Component Date Value Ref Range Status   • Glucose 01/06/2018 320* 60 - 100 mg/dL Final   • BUN 01/06/2018 14  7 - 21 mg/dL Final   • Creatinine 01/06/2018 0.80  0.70 - 1.30 mg/dL Final   • Sodium 01/06/2018 129* 137 - 145 mmol/L Final   • Potassium 01/06/2018 4.1  3.5 - 5.1 mmol/L Final   • Chloride 01/06/2018 93* 95 - 110 mmol/L Final   • CO2 01/06/2018 28.0  22.0 - 31.0 mmol/L Final   • Calcium 01/06/2018 9.1  8.4 - 10.2 mg/dL Final   • Total Protein 01/06/2018 7.3  6.3 - 8.6 g/dL Final   • Albumin 01/06/2018 3.90  3.40 - 4.80 g/dL Final   • ALT (SGPT) 01/06/2018 47  21 - 72 U/L Final   • AST (SGOT) 01/06/2018 28  17 - 59 U/L Final   • Alkaline Phosphatase 01/06/2018 70  38 - 126 U/L Final   • Total Bilirubin 01/06/2018 0.3  0.2 - 1.3 mg/dL Final   • eGFR Non African Amer 01/06/2018 108  70 - 162 mL/min/1.73 Final   • Globulin 01/06/2018 3.4  2.3 - 3.5 gm/dL Final   • A/G Ratio 01/06/2018 1.1  1.1 - 1.8 g/dL Final   • BUN/Creatinine Ratio 01/06/2018 17.5  7.0 - 25.0 Final   • Anion Gap 01/06/2018 8.0  5.0 - 15.0 mmol/L Final   • proBNP 01/06/2018 35.3  0.0 - 450.0 pg/mL Final   • Creatine Kinase 01/06/2018 175* 55 - 170 U/L Final   • Troponin I 01/06/2018 <0.012  <=0.034 ng/mL Final   • CKMB 01/06/2018 2.26  0.00 - 5.00 ng/mL Final   • WBC 01/06/2018 7.45  3.20 - 9.80 10*3/mm3 Final   • RBC 01/06/2018 4.87  4.37 - 5.74 10*6/mm3 Final   • Hemoglobin 01/06/2018 13.4* 13.7 - 17.3 g/dL Final   • Hematocrit 01/06/2018 39.6  39.0 - 49.0 % Final   • MCV 01/06/2018 81.3  80.0 - 98.0 fL Final   • MCH 01/06/2018 27.5  26.5 - 34.0 pg Final   • MCHC 01/06/2018 33.8  31.5 - 36.3 g/dL Final   • RDW 01/06/2018 14.1  11.5 - 14.5 % Final   • RDW-SD 01/06/2018 42.1  35.1 - 43.9 fl Final   • MPV 01/06/2018 8.9  8.0 -  12.0 fL Final   • Platelets 01/06/2018 162  150 - 450 10*3/mm3 Final   • Neutrophil % 01/06/2018 65.6  37.0 - 80.0 % Final   • Lymphocyte % 01/06/2018 20.7  10.0 - 50.0 % Final   • Monocyte % 01/06/2018 9.4  0.0 - 12.0 % Final   • Eosinophil % 01/06/2018 3.1  0.0 - 7.0 % Final   • Basophil % 01/06/2018 0.3  0.0 - 2.0 % Final   • Immature Grans % 01/06/2018 0.9* 0.0 - 0.5 % Final   • Neutrophils, Absolute 01/06/2018 4.89  2.00 - 8.60 10*3/mm3 Final   • Lymphocytes, Absolute 01/06/2018 1.54  0.60 - 4.20 10*3/mm3 Final   • Monocytes, Absolute 01/06/2018 0.70  0.00 - 0.90 10*3/mm3 Final   • Eosinophils, Absolute 01/06/2018 0.23  0.00 - 0.70 10*3/mm3 Final   • Basophils, Absolute 01/06/2018 0.02  0.00 - 0.20 10*3/mm3 Final   • Immature Grans, Absolute 01/06/2018 0.07* 0.00 - 0.02 10*3/mm3 Final   Admission on 01/01/2018, Discharged on 01/02/2018   Component Date Value Ref Range Status   • Glucose 01/01/2018 345* 60 - 100 mg/dL Final   • BUN 01/01/2018 8  7 - 21 mg/dL Final   • Creatinine 01/01/2018 0.67* 0.70 - 1.30 mg/dL Final   • Sodium 01/01/2018 135* 137 - 145 mmol/L Final   • Potassium 01/01/2018 4.2  3.5 - 5.1 mmol/L Final   • Chloride 01/01/2018 97  95 - 110 mmol/L Final   • CO2 01/01/2018 28.0  22.0 - 31.0 mmol/L Final   • Calcium 01/01/2018 8.5  8.4 - 10.2 mg/dL Final   • Total Protein 01/01/2018 7.9  6.3 - 8.6 g/dL Final   • Albumin 01/01/2018 3.90  3.40 - 4.80 g/dL Final   • ALT (SGPT) 01/01/2018 34  21 - 72 U/L Final   • AST (SGOT) 01/01/2018 22  17 - 59 U/L Final   • Alkaline Phosphatase 01/01/2018 65  38 - 126 U/L Final   • Total Bilirubin 01/01/2018 0.4  0.2 - 1.3 mg/dL Final   • eGFR Non African Amer 01/01/2018 132  70 - 162 mL/min/1.73 Final   • Globulin 01/01/2018 4.0* 2.3 - 3.5 gm/dL Final   • A/G Ratio 01/01/2018 1.0* 1.1 - 1.8 g/dL Final   • BUN/Creatinine Ratio 01/01/2018 11.9  7.0 - 25.0 Final   • Anion Gap 01/01/2018 10.0  5.0 - 15.0 mmol/L Final   • Protime 01/01/2018 12.9  11.1 - 15.3 Seconds  Final   • INR 01/01/2018 0.98  0.80 - 1.20 Final   • PTT 01/01/2018 32.3  20.0 - 40.3 seconds Final   • WBC 01/01/2018 6.15  3.20 - 9.80 10*3/mm3 Final   • RBC 01/01/2018 4.76  4.37 - 5.74 10*6/mm3 Final   • Hemoglobin 01/01/2018 13.0* 13.7 - 17.3 g/dL Final   • Hematocrit 01/01/2018 38.7* 39.0 - 49.0 % Final   • MCV 01/01/2018 81.3  80.0 - 98.0 fL Final   • MCH 01/01/2018 27.3  26.5 - 34.0 pg Final   • MCHC 01/01/2018 33.6  31.5 - 36.3 g/dL Final   • RDW 01/01/2018 14.0  11.5 - 14.5 % Final   • RDW-SD 01/01/2018 41.9  35.1 - 43.9 fl Final   • MPV 01/01/2018 8.7  8.0 - 12.0 fL Final   • Platelets 01/01/2018 158  150 - 450 10*3/mm3 Final   • Neutrophil % 01/01/2018 56.5  37.0 - 80.0 % Final   • Lymphocyte % 01/01/2018 25.9  10.0 - 50.0 % Final   • Monocyte % 01/01/2018 11.7  0.0 - 12.0 % Final   • Eosinophil % 01/01/2018 3.9  0.0 - 7.0 % Final   • Basophil % 01/01/2018 0.2  0.0 - 2.0 % Final   • Immature Grans % 01/01/2018 1.8* 0.0 - 0.5 % Final   • Neutrophils, Absolute 01/01/2018 3.48  2.00 - 8.60 10*3/mm3 Final   • Lymphocytes, Absolute 01/01/2018 1.59  0.60 - 4.20 10*3/mm3 Final   • Monocytes, Absolute 01/01/2018 0.72  0.00 - 0.90 10*3/mm3 Final   • Eosinophils, Absolute 01/01/2018 0.24  0.00 - 0.70 10*3/mm3 Final   • Basophils, Absolute 01/01/2018 0.01  0.00 - 0.20 10*3/mm3 Final   • Immature Grans, Absolute 01/01/2018 0.11* 0.00 - 0.02 10*3/mm3 Final   • Extra Tube 01/01/2018 hold for add-on   Final    Auto resulted   • Extra Tube 01/01/2018 Hold for add-ons.   Final    Auto resulted.   • Extra Tube 01/01/2018 hold for add-on   Final    Auto resulted   • Extra Tube 01/01/2018 Hold for add-ons.   Final    Auto resulted.   • Troponin I 01/01/2018 <0.012  <=0.034 ng/mL Final     Labs have been independently reviewed  This document has been electronically signed by LALA Barajas on January 26, 2018 10:10 AM

## 2018-01-25 NOTE — PROGRESS NOTES
Letter was faxed to Sunday Khan at the [facility], pertaining to the patient being dependant on insulin.   The patient was provided with the following education materials for Type 2 Diabetes:   Type 2 Diabetes changes; How and Why  Checking your Blood Sugar  Living Well with Diabetes  Planning Healthy Meals  Blood Glucose Log ( both in chart and log formats)

## 2018-01-26 ENCOUNTER — HOSPITAL ENCOUNTER (OUTPATIENT)
Facility: HOSPITAL | Age: 38
Setting detail: OBSERVATION
Discharge: HOME | End: 2018-01-29
Attending: EMERGENCY MEDICINE | Admitting: FAMILY MEDICINE

## 2018-01-26 ENCOUNTER — APPOINTMENT (OUTPATIENT)
Dept: CT IMAGING | Facility: HOSPITAL | Age: 38
End: 2018-01-26

## 2018-01-26 DIAGNOSIS — E11.65 TYPE 2 DIABETES MELLITUS WITH HYPERGLYCEMIA, WITHOUT LONG-TERM CURRENT USE OF INSULIN (HCC): ICD-10-CM

## 2018-01-26 DIAGNOSIS — K62.5 GASTROINTESTINAL HEMORRHAGE ASSOCIATED WITH ANORECTAL SOURCE: Primary | ICD-10-CM

## 2018-01-26 LAB
ALBUMIN SERPL-MCNC: 3.9 G/DL (ref 3.4–4.8)
ALBUMIN/GLOB SERPL: 1.1 G/DL (ref 1.1–1.8)
ALP SERPL-CCNC: 76 U/L (ref 38–126)
ALT SERPL W P-5'-P-CCNC: 36 U/L (ref 21–72)
ANION GAP SERPL CALCULATED.3IONS-SCNC: 11 MMOL/L (ref 5–15)
ANISOCYTOSIS BLD QL: NORMAL
APTT PPP: 27.1 SECONDS (ref 20–40.3)
AST SERPL-CCNC: 37 U/L (ref 17–59)
BASOPHILS # BLD AUTO: 0.02 10*3/MM3 (ref 0–0.2)
BASOPHILS NFR BLD AUTO: 0.3 % (ref 0–2)
BILIRUB SERPL-MCNC: 0.3 MG/DL (ref 0.2–1.3)
BUN BLD-MCNC: 9 MG/DL (ref 7–21)
BUN/CREAT SERPL: 14.8 (ref 7–25)
CALCIUM SPEC-SCNC: 9 MG/DL (ref 8.4–10.2)
CHLORIDE SERPL-SCNC: 99 MMOL/L (ref 95–110)
CO2 SERPL-SCNC: 26 MMOL/L (ref 22–31)
CREAT BLD-MCNC: 0.61 MG/DL (ref 0.7–1.3)
DEPRECATED RDW RBC AUTO: 43.6 FL (ref 35.1–43.9)
EOSINOPHIL # BLD AUTO: 0.27 10*3/MM3 (ref 0–0.7)
EOSINOPHIL NFR BLD AUTO: 4 % (ref 0–7)
ERYTHROCYTE [DISTWIDTH] IN BLOOD BY AUTOMATED COUNT: 14.6 % (ref 11.5–14.5)
GFR SERPL CREATININE-BSD FRML MDRD: 147 ML/MIN/1.73 (ref 70–162)
GLOBULIN UR ELPH-MCNC: 3.4 GM/DL (ref 2.3–3.5)
GLUCOSE BLD-MCNC: 220 MG/DL (ref 60–100)
GLUCOSE BLDC GLUCOMTR-MCNC: 132 MG/DL (ref 70–130)
GLUCOSE BLDC GLUCOMTR-MCNC: 136 MG/DL (ref 70–130)
GLUCOSE BLDC GLUCOMTR-MCNC: 270 MG/DL (ref 70–130)
GLUCOSE BLDC GLUCOMTR-MCNC: 281 MG/DL (ref 70–130)
HCT VFR BLD AUTO: 38.9 % (ref 39–49)
HEMOCCULT STL QL: NEGATIVE
HGB BLD-MCNC: 13.3 G/DL (ref 13.7–17.3)
IMM GRANULOCYTES # BLD: 0.08 10*3/MM3 (ref 0–0.02)
IMM GRANULOCYTES NFR BLD: 1.2 % (ref 0–0.5)
INR PPP: 1.25 (ref 0.8–1.2)
LYMPHOCYTES # BLD AUTO: 1.19 10*3/MM3 (ref 0.6–4.2)
LYMPHOCYTES NFR BLD AUTO: 17.8 % (ref 10–50)
MCH RBC QN AUTO: 28.1 PG (ref 26.5–34)
MCHC RBC AUTO-ENTMCNC: 34.2 G/DL (ref 31.5–36.3)
MCV RBC AUTO: 82.1 FL (ref 80–98)
MONOCYTES # BLD AUTO: 0.6 10*3/MM3 (ref 0–0.9)
MONOCYTES NFR BLD AUTO: 9 % (ref 0–12)
NEUTROPHILS # BLD AUTO: 4.52 10*3/MM3 (ref 2–8.6)
NEUTROPHILS NFR BLD AUTO: 67.7 % (ref 37–80)
NRBC BLD MANUAL-RTO: 0 /100 WBC (ref 0–0)
PLATELET # BLD AUTO: 161 10*3/MM3 (ref 150–450)
PMV BLD AUTO: 8.7 FL (ref 8–12)
POTASSIUM BLD-SCNC: 4 MMOL/L (ref 3.5–5.1)
PROT SERPL-MCNC: 7.3 G/DL (ref 6.3–8.6)
PROTHROMBIN TIME: 15.7 SECONDS (ref 11.1–15.3)
RBC # BLD AUTO: 4.74 10*6/MM3 (ref 4.37–5.74)
SMALL PLATELETS BLD QL SMEAR: ADEQUATE
SODIUM BLD-SCNC: 136 MMOL/L (ref 137–145)
TROPONIN I SERPL-MCNC: <0.012 NG/ML
WBC MORPH BLD: NORMAL
WBC NRBC COR # BLD: 6.68 10*3/MM3 (ref 3.2–9.8)
WHOLE BLOOD HOLD SPECIMEN: NORMAL
WHOLE BLOOD HOLD SPECIMEN: NORMAL

## 2018-01-26 PROCEDURE — 80053 COMPREHEN METABOLIC PANEL: CPT | Performed by: EMERGENCY MEDICINE

## 2018-01-26 PROCEDURE — 99219 PR INITIAL OBSERVATION CARE/DAY 50 MINUTES: CPT | Performed by: STUDENT IN AN ORGANIZED HEALTH CARE EDUCATION/TRAINING PROGRAM

## 2018-01-26 PROCEDURE — G0378 HOSPITAL OBSERVATION PER HR: HCPCS

## 2018-01-26 PROCEDURE — 74177 CT ABD & PELVIS W/CONTRAST: CPT

## 2018-01-26 PROCEDURE — 63710000001 INSULIN ASPART PER 5 UNITS: Performed by: STUDENT IN AN ORGANIZED HEALTH CARE EDUCATION/TRAINING PROGRAM

## 2018-01-26 PROCEDURE — 82962 GLUCOSE BLOOD TEST: CPT

## 2018-01-26 PROCEDURE — 85610 PROTHROMBIN TIME: CPT | Performed by: EMERGENCY MEDICINE

## 2018-01-26 PROCEDURE — 85730 THROMBOPLASTIN TIME PARTIAL: CPT | Performed by: EMERGENCY MEDICINE

## 2018-01-26 PROCEDURE — 99284 EMERGENCY DEPT VISIT MOD MDM: CPT

## 2018-01-26 PROCEDURE — 63710000001 INSULIN DETEMIR PER 5 UNITS: Performed by: STUDENT IN AN ORGANIZED HEALTH CARE EDUCATION/TRAINING PROGRAM

## 2018-01-26 PROCEDURE — 25010000002 MORPHINE PER 10 MG: Performed by: FAMILY MEDICINE

## 2018-01-26 PROCEDURE — 93010 ELECTROCARDIOGRAM REPORT: CPT | Performed by: INTERNAL MEDICINE

## 2018-01-26 PROCEDURE — 82272 OCCULT BLD FECES 1-3 TESTS: CPT | Performed by: FAMILY MEDICINE

## 2018-01-26 PROCEDURE — 85007 BL SMEAR W/DIFF WBC COUNT: CPT | Performed by: EMERGENCY MEDICINE

## 2018-01-26 PROCEDURE — 0 DIATRIZOATE MEGLUMINE & SODIUM PER 1 ML: Performed by: STUDENT IN AN ORGANIZED HEALTH CARE EDUCATION/TRAINING PROGRAM

## 2018-01-26 PROCEDURE — 93005 ELECTROCARDIOGRAM TRACING: CPT | Performed by: FAMILY MEDICINE

## 2018-01-26 PROCEDURE — 85025 COMPLETE CBC W/AUTO DIFF WBC: CPT | Performed by: EMERGENCY MEDICINE

## 2018-01-26 PROCEDURE — 96374 THER/PROPH/DIAG INJ IV PUSH: CPT

## 2018-01-26 PROCEDURE — 84484 ASSAY OF TROPONIN QUANT: CPT | Performed by: FAMILY MEDICINE

## 2018-01-26 PROCEDURE — 0 IOPAMIDOL 61 % SOLUTION: Performed by: STUDENT IN AN ORGANIZED HEALTH CARE EDUCATION/TRAINING PROGRAM

## 2018-01-26 RX ORDER — DEXTROSE MONOHYDRATE 25 G/50ML
25 INJECTION, SOLUTION INTRAVENOUS
Status: DISCONTINUED | OUTPATIENT
Start: 2018-01-26 | End: 2018-01-29 | Stop reason: HOSPADM

## 2018-01-26 RX ORDER — LANCETS
EACH MISCELLANEOUS
Qty: 100 EACH | Refills: 5 | Status: SHIPPED | OUTPATIENT
Start: 2018-01-26 | End: 2018-04-02 | Stop reason: SDUPTHER

## 2018-01-26 RX ORDER — PANTOPRAZOLE SODIUM 40 MG/1
40 TABLET, DELAYED RELEASE ORAL NIGHTLY
Status: DISCONTINUED | OUTPATIENT
Start: 2018-01-26 | End: 2018-01-29 | Stop reason: HOSPADM

## 2018-01-26 RX ORDER — HYDRALAZINE HYDROCHLORIDE 20 MG/ML
20 INJECTION INTRAMUSCULAR; INTRAVENOUS EVERY 6 HOURS PRN
Status: DISCONTINUED | OUTPATIENT
Start: 2018-01-26 | End: 2018-01-29 | Stop reason: HOSPADM

## 2018-01-26 RX ORDER — SODIUM CHLORIDE 0.9 % (FLUSH) 0.9 %
1-10 SYRINGE (ML) INJECTION AS NEEDED
Status: DISCONTINUED | OUTPATIENT
Start: 2018-01-26 | End: 2018-01-29 | Stop reason: HOSPADM

## 2018-01-26 RX ORDER — NITROGLYCERIN 0.4 MG/1
0.4 TABLET SUBLINGUAL
Status: DISCONTINUED | OUTPATIENT
Start: 2018-01-26 | End: 2018-01-29 | Stop reason: HOSPADM

## 2018-01-26 RX ORDER — ALUMINA, MAGNESIA, AND SIMETHICONE 2400; 2400; 240 MG/30ML; MG/30ML; MG/30ML
15 SUSPENSION ORAL ONCE
Status: COMPLETED | OUTPATIENT
Start: 2018-01-26 | End: 2018-01-26

## 2018-01-26 RX ORDER — ISOSORBIDE MONONITRATE 60 MG/1
120 TABLET, EXTENDED RELEASE ORAL DAILY
Status: DISCONTINUED | OUTPATIENT
Start: 2018-01-26 | End: 2018-01-29 | Stop reason: HOSPADM

## 2018-01-26 RX ORDER — METOPROLOL SUCCINATE 200 MG/1
200 TABLET, EXTENDED RELEASE ORAL DAILY
Qty: 31 TABLET | Refills: 6 | Status: SHIPPED | OUTPATIENT
Start: 2018-01-26 | End: 2018-12-07

## 2018-01-26 RX ORDER — ONDANSETRON 4 MG/1
4 TABLET, ORALLY DISINTEGRATING ORAL EVERY 6 HOURS PRN
Status: DISCONTINUED | OUTPATIENT
Start: 2018-01-26 | End: 2018-01-29 | Stop reason: HOSPADM

## 2018-01-26 RX ORDER — LANCETS
EACH MISCELLANEOUS
Qty: 100 EACH | Refills: 5 | Status: SHIPPED | OUTPATIENT
Start: 2018-01-26 | End: 2018-01-26 | Stop reason: SDUPTHER

## 2018-01-26 RX ORDER — ATORVASTATIN CALCIUM 20 MG/1
20 TABLET, FILM COATED ORAL DAILY
Status: DISCONTINUED | OUTPATIENT
Start: 2018-01-26 | End: 2018-01-29 | Stop reason: HOSPADM

## 2018-01-26 RX ORDER — ACETAMINOPHEN 325 MG/1
650 TABLET ORAL EVERY 4 HOURS PRN
Status: DISCONTINUED | OUTPATIENT
Start: 2018-01-26 | End: 2018-01-29 | Stop reason: HOSPADM

## 2018-01-26 RX ORDER — HYDROCODONE BITARTRATE AND ACETAMINOPHEN 10; 325 MG/1; MG/1
1 TABLET ORAL EVERY 6 HOURS PRN
Status: DISCONTINUED | OUTPATIENT
Start: 2018-01-26 | End: 2018-01-29 | Stop reason: HOSPADM

## 2018-01-26 RX ORDER — RANOLAZINE 500 MG/1
1000 TABLET, EXTENDED RELEASE ORAL EVERY 12 HOURS SCHEDULED
Status: DISCONTINUED | OUTPATIENT
Start: 2018-01-26 | End: 2018-01-29 | Stop reason: HOSPADM

## 2018-01-26 RX ORDER — CALCIUM CARBONATE 200(500)MG
2 TABLET,CHEWABLE ORAL 2 TIMES DAILY PRN
Status: DISCONTINUED | OUTPATIENT
Start: 2018-01-26 | End: 2018-01-29 | Stop reason: HOSPADM

## 2018-01-26 RX ORDER — NICOTINE POLACRILEX 4 MG
15 LOZENGE BUCCAL
Status: DISCONTINUED | OUTPATIENT
Start: 2018-01-26 | End: 2018-01-29 | Stop reason: HOSPADM

## 2018-01-26 RX ORDER — TRAZODONE HYDROCHLORIDE 150 MG/1
300 TABLET ORAL NIGHTLY
Status: DISCONTINUED | OUTPATIENT
Start: 2018-01-26 | End: 2018-01-29 | Stop reason: HOSPADM

## 2018-01-26 RX ORDER — MORPHINE SULFATE 2 MG/ML
1 INJECTION, SOLUTION INTRAMUSCULAR; INTRAVENOUS EVERY 4 HOURS PRN
Status: DISCONTINUED | OUTPATIENT
Start: 2018-01-26 | End: 2018-01-29 | Stop reason: HOSPADM

## 2018-01-26 RX ORDER — LISINOPRIL 40 MG/1
40 TABLET ORAL EVERY MORNING
Status: DISCONTINUED | OUTPATIENT
Start: 2018-01-27 | End: 2018-01-29 | Stop reason: HOSPADM

## 2018-01-26 RX ORDER — METOPROLOL SUCCINATE 100 MG/1
200 TABLET, EXTENDED RELEASE ORAL DAILY
Status: DISCONTINUED | OUTPATIENT
Start: 2018-01-26 | End: 2018-01-29 | Stop reason: HOSPADM

## 2018-01-26 RX ORDER — AMLODIPINE BESYLATE 10 MG/1
10 TABLET ORAL NIGHTLY
Status: DISCONTINUED | OUTPATIENT
Start: 2018-01-26 | End: 2018-01-29 | Stop reason: HOSPADM

## 2018-01-26 RX ORDER — SERTRALINE HYDROCHLORIDE 25 MG/1
25 TABLET, FILM COATED ORAL DAILY
Status: DISCONTINUED | OUTPATIENT
Start: 2018-01-26 | End: 2018-01-29 | Stop reason: HOSPADM

## 2018-01-26 RX ADMIN — SERTRALINE HYDROCHLORIDE 25 MG: 25 TABLET ORAL at 19:45

## 2018-01-26 RX ADMIN — ALUMINUM HYDROXIDE, MAGNESIUM HYDROXIDE, AND DIMETHICONE 15 ML: 400; 400; 40 SUSPENSION ORAL at 23:04

## 2018-01-26 RX ADMIN — RIVAROXABAN 20 MG: 10 TABLET, FILM COATED ORAL at 23:04

## 2018-01-26 RX ADMIN — TRAZODONE HYDROCHLORIDE 300 MG: 150 TABLET ORAL at 21:24

## 2018-01-26 RX ADMIN — INSULIN ASPART 8 UNITS: 100 INJECTION, SOLUTION INTRAVENOUS; SUBCUTANEOUS at 23:04

## 2018-01-26 RX ADMIN — LIDOCAINE HYDROCHLORIDE 15 ML: 20 SOLUTION ORAL; TOPICAL at 23:04

## 2018-01-26 RX ADMIN — RANOLAZINE 1000 MG: 500 TABLET, FILM COATED, EXTENDED RELEASE ORAL at 21:25

## 2018-01-26 RX ADMIN — INSULIN ASPART 15 UNITS: 100 INJECTION, SOLUTION INTRAVENOUS; SUBCUTANEOUS at 19:41

## 2018-01-26 RX ADMIN — MORPHINE SULFATE 1 MG: 2 INJECTION, SOLUTION INTRAMUSCULAR; INTRAVENOUS at 23:05

## 2018-01-26 RX ADMIN — DIATRIZOATE MEGLUMINE AND DIATRIZOATE SODIUM 40 ML: 660; 100 LIQUID ORAL; RECTAL at 18:56

## 2018-01-26 RX ADMIN — INSULIN DETEMIR 35 UNITS: 100 INJECTION, SOLUTION SUBCUTANEOUS at 21:29

## 2018-01-26 RX ADMIN — IOPAMIDOL 95 ML: 612 INJECTION, SOLUTION INTRAVENOUS at 18:57

## 2018-01-26 RX ADMIN — AMLODIPINE BESYLATE 10 MG: 10 TABLET ORAL at 21:25

## 2018-01-26 RX ADMIN — ATORVASTATIN CALCIUM 20 MG: 20 TABLET, FILM COATED ORAL at 19:46

## 2018-01-26 RX ADMIN — HYDROCODONE BITARTRATE AND ACETAMINOPHEN 1 TABLET: 10; 325 TABLET ORAL at 21:24

## 2018-01-26 RX ADMIN — ONDANSETRON 4 MG: 4 TABLET, ORALLY DISINTEGRATING ORAL at 19:20

## 2018-01-26 RX ADMIN — METOPROLOL SUCCINATE 200 MG: 100 TABLET, EXTENDED RELEASE ORAL at 19:46

## 2018-01-26 RX ADMIN — ISOSORBIDE MONONITRATE 120 MG: 60 TABLET, EXTENDED RELEASE ORAL at 19:45

## 2018-01-26 RX ADMIN — PRAMIPEXOLE DIHYDROCHLORIDE 0.75 MG: 0.5 TABLET ORAL at 21:25

## 2018-01-26 RX ADMIN — PANTOPRAZOLE SODIUM 40 MG: 40 TABLET, DELAYED RELEASE ORAL at 21:24

## 2018-01-27 LAB
ALBUMIN SERPL-MCNC: 3.7 G/DL (ref 3.4–4.8)
ALBUMIN/GLOB SERPL: 1.2 G/DL (ref 1.1–1.8)
ALP SERPL-CCNC: 65 U/L (ref 38–126)
ALT SERPL W P-5'-P-CCNC: 35 U/L (ref 21–72)
ANION GAP SERPL CALCULATED.3IONS-SCNC: 12 MMOL/L (ref 5–15)
AST SERPL-CCNC: 24 U/L (ref 17–59)
BILIRUB SERPL-MCNC: 0.7 MG/DL (ref 0.2–1.3)
BUN BLD-MCNC: 9 MG/DL (ref 7–21)
BUN/CREAT SERPL: 15.5 (ref 7–25)
CALCIUM SPEC-SCNC: 8.2 MG/DL (ref 8.4–10.2)
CHLORIDE SERPL-SCNC: 97 MMOL/L (ref 95–110)
CO2 SERPL-SCNC: 24 MMOL/L (ref 22–31)
CREAT BLD-MCNC: 0.58 MG/DL (ref 0.7–1.3)
DEPRECATED RDW RBC AUTO: 43.3 FL (ref 35.1–43.9)
ERYTHROCYTE [DISTWIDTH] IN BLOOD BY AUTOMATED COUNT: 14.5 % (ref 11.5–14.5)
GFR SERPL CREATININE-BSD FRML MDRD: >150 ML/MIN/1.73 (ref 60–162)
GLOBULIN UR ELPH-MCNC: 3.1 GM/DL (ref 2.3–3.5)
GLUCOSE BLD-MCNC: 191 MG/DL (ref 60–100)
GLUCOSE BLDC GLUCOMTR-MCNC: 152 MG/DL (ref 70–130)
GLUCOSE BLDC GLUCOMTR-MCNC: 165 MG/DL (ref 70–130)
GLUCOSE BLDC GLUCOMTR-MCNC: 170 MG/DL (ref 70–130)
GLUCOSE BLDC GLUCOMTR-MCNC: 173 MG/DL (ref 70–130)
GLUCOSE BLDC GLUCOMTR-MCNC: 197 MG/DL (ref 70–130)
HCT VFR BLD AUTO: 37.3 % (ref 39–49)
HGB BLD-MCNC: 12.4 G/DL (ref 13.7–17.3)
MCH RBC QN AUTO: 27.1 PG (ref 26.5–34)
MCHC RBC AUTO-ENTMCNC: 33.2 G/DL (ref 31.5–36.3)
MCV RBC AUTO: 81.4 FL (ref 80–98)
PLATELET # BLD AUTO: 157 10*3/MM3 (ref 150–450)
PMV BLD AUTO: 9 FL (ref 8–12)
POTASSIUM BLD-SCNC: 4.3 MMOL/L (ref 3.5–5.1)
PROT SERPL-MCNC: 6.8 G/DL (ref 6.3–8.6)
RBC # BLD AUTO: 4.58 10*6/MM3 (ref 4.37–5.74)
SODIUM BLD-SCNC: 133 MMOL/L (ref 137–145)
WBC NRBC COR # BLD: 7.48 10*3/MM3 (ref 3.2–9.8)

## 2018-01-27 PROCEDURE — 85027 COMPLETE CBC AUTOMATED: CPT | Performed by: STUDENT IN AN ORGANIZED HEALTH CARE EDUCATION/TRAINING PROGRAM

## 2018-01-27 PROCEDURE — 99225 PR SBSQ OBSERVATION CARE/DAY 25 MINUTES: CPT | Performed by: FAMILY MEDICINE

## 2018-01-27 PROCEDURE — G0378 HOSPITAL OBSERVATION PER HR: HCPCS

## 2018-01-27 PROCEDURE — 82962 GLUCOSE BLOOD TEST: CPT

## 2018-01-27 PROCEDURE — 80053 COMPREHEN METABOLIC PANEL: CPT | Performed by: STUDENT IN AN ORGANIZED HEALTH CARE EDUCATION/TRAINING PROGRAM

## 2018-01-27 PROCEDURE — 63710000001 INSULIN ASPART PER 5 UNITS: Performed by: STUDENT IN AN ORGANIZED HEALTH CARE EDUCATION/TRAINING PROGRAM

## 2018-01-27 PROCEDURE — 25010000002 MORPHINE PER 10 MG: Performed by: FAMILY MEDICINE

## 2018-01-27 PROCEDURE — 63710000001 INSULIN DETEMIR PER 5 UNITS: Performed by: STUDENT IN AN ORGANIZED HEALTH CARE EDUCATION/TRAINING PROGRAM

## 2018-01-27 PROCEDURE — 96376 TX/PRO/DX INJ SAME DRUG ADON: CPT

## 2018-01-27 RX ORDER — CLOPIDOGREL BISULFATE 75 MG/1
75 TABLET ORAL DAILY
Status: DISCONTINUED | OUTPATIENT
Start: 2018-01-27 | End: 2018-01-29 | Stop reason: HOSPADM

## 2018-01-27 RX ADMIN — MORPHINE SULFATE 1 MG: 2 INJECTION, SOLUTION INTRAMUSCULAR; INTRAVENOUS at 06:09

## 2018-01-27 RX ADMIN — INSULIN DETEMIR 35 UNITS: 100 INJECTION, SOLUTION SUBCUTANEOUS at 20:17

## 2018-01-27 RX ADMIN — ONDANSETRON 4 MG: 4 TABLET, ORALLY DISINTEGRATING ORAL at 00:56

## 2018-01-27 RX ADMIN — HYDROCODONE BITARTRATE AND ACETAMINOPHEN 1 TABLET: 10; 325 TABLET ORAL at 20:25

## 2018-01-27 RX ADMIN — PRAMIPEXOLE DIHYDROCHLORIDE 0.75 MG: 0.5 TABLET ORAL at 20:19

## 2018-01-27 RX ADMIN — HYDROCODONE BITARTRATE AND ACETAMINOPHEN 1 TABLET: 10; 325 TABLET ORAL at 04:34

## 2018-01-27 RX ADMIN — ISOSORBIDE MONONITRATE 120 MG: 60 TABLET, EXTENDED RELEASE ORAL at 07:31

## 2018-01-27 RX ADMIN — RIVAROXABAN 20 MG: 10 TABLET, FILM COATED ORAL at 20:18

## 2018-01-27 RX ADMIN — LISINOPRIL 40 MG: 40 TABLET ORAL at 06:04

## 2018-01-27 RX ADMIN — INSULIN ASPART 2 UNITS: 100 INJECTION, SOLUTION INTRAVENOUS; SUBCUTANEOUS at 20:18

## 2018-01-27 RX ADMIN — TRAZODONE HYDROCHLORIDE 300 MG: 150 TABLET ORAL at 20:18

## 2018-01-27 RX ADMIN — SERTRALINE HYDROCHLORIDE 25 MG: 25 TABLET ORAL at 07:30

## 2018-01-27 RX ADMIN — METOPROLOL SUCCINATE 200 MG: 100 TABLET, EXTENDED RELEASE ORAL at 07:31

## 2018-01-27 RX ADMIN — INSULIN ASPART 15 UNITS: 100 INJECTION, SOLUTION INTRAVENOUS; SUBCUTANEOUS at 07:29

## 2018-01-27 RX ADMIN — ATORVASTATIN CALCIUM 20 MG: 20 TABLET, FILM COATED ORAL at 07:30

## 2018-01-27 RX ADMIN — INSULIN ASPART 3 UNITS: 100 INJECTION, SOLUTION INTRAVENOUS; SUBCUTANEOUS at 11:25

## 2018-01-27 RX ADMIN — CLOPIDOGREL BISULFATE 75 MG: 75 TABLET ORAL at 13:00

## 2018-01-27 RX ADMIN — INSULIN ASPART 3 UNITS: 100 INJECTION, SOLUTION INTRAVENOUS; SUBCUTANEOUS at 17:38

## 2018-01-27 RX ADMIN — INSULIN ASPART 15 UNITS: 100 INJECTION, SOLUTION INTRAVENOUS; SUBCUTANEOUS at 17:38

## 2018-01-27 RX ADMIN — Medication 10 ML: at 07:34

## 2018-01-27 RX ADMIN — RANOLAZINE 1000 MG: 500 TABLET, FILM COATED, EXTENDED RELEASE ORAL at 07:30

## 2018-01-27 RX ADMIN — AMLODIPINE BESYLATE 10 MG: 10 TABLET ORAL at 20:19

## 2018-01-27 RX ADMIN — PANTOPRAZOLE SODIUM 40 MG: 40 TABLET, DELAYED RELEASE ORAL at 20:18

## 2018-01-27 RX ADMIN — INSULIN ASPART 15 UNITS: 100 INJECTION, SOLUTION INTRAVENOUS; SUBCUTANEOUS at 11:24

## 2018-01-27 RX ADMIN — INSULIN ASPART 3 UNITS: 100 INJECTION, SOLUTION INTRAVENOUS; SUBCUTANEOUS at 07:30

## 2018-01-27 RX ADMIN — RANOLAZINE 1000 MG: 500 TABLET, FILM COATED, EXTENDED RELEASE ORAL at 20:19

## 2018-01-27 RX ADMIN — ONDANSETRON 4 MG: 4 TABLET, ORALLY DISINTEGRATING ORAL at 05:32

## 2018-01-28 PROBLEM — N30.80 BACTERIAL CYSTITIS: Status: ACTIVE | Noted: 2018-01-28

## 2018-01-28 LAB
BACTERIA UR QL AUTO: ABNORMAL /HPF
BASOPHILS # BLD AUTO: 0.01 10*3/MM3 (ref 0–0.2)
BASOPHILS NFR BLD AUTO: 0.3 % (ref 0–2)
BILIRUB UR QL STRIP: ABNORMAL
CLARITY UR: CLEAR
COD CRY URNS QL: ABNORMAL /HPF
COLOR UR: ABNORMAL
DEPRECATED RDW RBC AUTO: 43.1 FL (ref 35.1–43.9)
EOSINOPHIL # BLD AUTO: 0.09 10*3/MM3 (ref 0–0.7)
EOSINOPHIL NFR BLD AUTO: 2.3 % (ref 0–7)
ERYTHROCYTE [DISTWIDTH] IN BLOOD BY AUTOMATED COUNT: 14.8 % (ref 11.5–14.5)
GLUCOSE BLDC GLUCOMTR-MCNC: 129 MG/DL (ref 70–130)
GLUCOSE BLDC GLUCOMTR-MCNC: 131 MG/DL (ref 70–130)
GLUCOSE BLDC GLUCOMTR-MCNC: 164 MG/DL (ref 70–130)
GLUCOSE BLDC GLUCOMTR-MCNC: 185 MG/DL (ref 70–130)
GLUCOSE UR STRIP-MCNC: NEGATIVE MG/DL
HCT VFR BLD AUTO: 39.4 % (ref 39–49)
HGB BLD-MCNC: 13.7 G/DL (ref 13.7–17.3)
HGB UR QL STRIP.AUTO: NEGATIVE
HYALINE CASTS UR QL AUTO: ABNORMAL /LPF
IMM GRANULOCYTES # BLD: 0.06 10*3/MM3 (ref 0–0.02)
IMM GRANULOCYTES NFR BLD: 1.5 % (ref 0–0.5)
KETONES UR QL STRIP: ABNORMAL
LEUKOCYTE ESTERASE UR QL STRIP.AUTO: ABNORMAL
LYMPHOCYTES # BLD AUTO: 0.89 10*3/MM3 (ref 0.6–4.2)
LYMPHOCYTES NFR BLD AUTO: 22.3 % (ref 10–50)
MCH RBC QN AUTO: 28 PG (ref 26.5–34)
MCHC RBC AUTO-ENTMCNC: 34.8 G/DL (ref 31.5–36.3)
MCV RBC AUTO: 80.4 FL (ref 80–98)
MONOCYTES # BLD AUTO: 0.66 10*3/MM3 (ref 0–0.9)
MONOCYTES NFR BLD AUTO: 16.5 % (ref 0–12)
MUCOUS THREADS URNS QL MICRO: ABNORMAL /HPF
NEUTROPHILS # BLD AUTO: 2.29 10*3/MM3 (ref 2–8.6)
NEUTROPHILS NFR BLD AUTO: 57.1 % (ref 37–80)
NITRITE UR QL STRIP: POSITIVE
PH UR STRIP.AUTO: <=5 [PH] (ref 5–9)
PLATELET # BLD AUTO: 135 10*3/MM3 (ref 150–450)
PMV BLD AUTO: 9.4 FL (ref 8–12)
PROT UR QL STRIP: ABNORMAL
RBC # BLD AUTO: 4.9 10*6/MM3 (ref 4.37–5.74)
RBC # UR: ABNORMAL /HPF
REF LAB TEST METHOD: ABNORMAL
SP GR UR STRIP: 1.03 (ref 1–1.03)
SQUAMOUS #/AREA URNS HPF: ABNORMAL /HPF
UROBILINOGEN UR QL STRIP: ABNORMAL
WBC NRBC COR # BLD: 4 10*3/MM3 (ref 3.2–9.8)
WBC UR QL AUTO: ABNORMAL /HPF

## 2018-01-28 PROCEDURE — 81001 URINALYSIS AUTO W/SCOPE: CPT | Performed by: FAMILY MEDICINE

## 2018-01-28 PROCEDURE — 96376 TX/PRO/DX INJ SAME DRUG ADON: CPT

## 2018-01-28 PROCEDURE — G0378 HOSPITAL OBSERVATION PER HR: HCPCS

## 2018-01-28 PROCEDURE — 25010000002 MORPHINE PER 10 MG: Performed by: FAMILY MEDICINE

## 2018-01-28 PROCEDURE — 82962 GLUCOSE BLOOD TEST: CPT

## 2018-01-28 PROCEDURE — 96375 TX/PRO/DX INJ NEW DRUG ADDON: CPT

## 2018-01-28 PROCEDURE — 63710000001 INSULIN ASPART PER 5 UNITS: Performed by: STUDENT IN AN ORGANIZED HEALTH CARE EDUCATION/TRAINING PROGRAM

## 2018-01-28 PROCEDURE — 63710000001 INSULIN DETEMIR PER 5 UNITS: Performed by: STUDENT IN AN ORGANIZED HEALTH CARE EDUCATION/TRAINING PROGRAM

## 2018-01-28 PROCEDURE — 85025 COMPLETE CBC W/AUTO DIFF WBC: CPT | Performed by: FAMILY MEDICINE

## 2018-01-28 PROCEDURE — 99225 PR SBSQ OBSERVATION CARE/DAY 25 MINUTES: CPT | Performed by: FAMILY MEDICINE

## 2018-01-28 PROCEDURE — 87086 URINE CULTURE/COLONY COUNT: CPT | Performed by: FAMILY MEDICINE

## 2018-01-28 PROCEDURE — 25010000002 CEFTRIAXONE PER 250 MG: Performed by: FAMILY MEDICINE

## 2018-01-28 RX ORDER — BUTALBITAL, ACETAMINOPHEN AND CAFFEINE 50; 325; 40 MG/1; MG/1; MG/1
2 TABLET ORAL EVERY 4 HOURS PRN
Status: DISCONTINUED | OUTPATIENT
Start: 2018-01-28 | End: 2018-01-29 | Stop reason: HOSPADM

## 2018-01-28 RX ADMIN — INSULIN ASPART 15 UNITS: 100 INJECTION, SOLUTION INTRAVENOUS; SUBCUTANEOUS at 17:31

## 2018-01-28 RX ADMIN — Medication 10 ML: at 11:41

## 2018-01-28 RX ADMIN — INSULIN ASPART 15 UNITS: 100 INJECTION, SOLUTION INTRAVENOUS; SUBCUTANEOUS at 07:25

## 2018-01-28 RX ADMIN — PANTOPRAZOLE SODIUM 40 MG: 40 TABLET, DELAYED RELEASE ORAL at 20:48

## 2018-01-28 RX ADMIN — ATORVASTATIN CALCIUM 20 MG: 20 TABLET, FILM COATED ORAL at 07:24

## 2018-01-28 RX ADMIN — SERTRALINE HYDROCHLORIDE 25 MG: 25 TABLET ORAL at 07:23

## 2018-01-28 RX ADMIN — CEFTRIAXONE SODIUM 1 G: 1 INJECTION, POWDER, FOR SOLUTION INTRAMUSCULAR; INTRAVENOUS at 10:27

## 2018-01-28 RX ADMIN — CLOPIDOGREL BISULFATE 75 MG: 75 TABLET ORAL at 07:25

## 2018-01-28 RX ADMIN — ISOSORBIDE MONONITRATE 120 MG: 60 TABLET, EXTENDED RELEASE ORAL at 07:24

## 2018-01-28 RX ADMIN — LISINOPRIL 40 MG: 40 TABLET ORAL at 05:50

## 2018-01-28 RX ADMIN — AMLODIPINE BESYLATE 10 MG: 10 TABLET ORAL at 20:47

## 2018-01-28 RX ADMIN — RANOLAZINE 1000 MG: 500 TABLET, FILM COATED, EXTENDED RELEASE ORAL at 20:48

## 2018-01-28 RX ADMIN — MORPHINE SULFATE 1 MG: 2 INJECTION, SOLUTION INTRAMUSCULAR; INTRAVENOUS at 06:22

## 2018-01-28 RX ADMIN — RIVAROXABAN 20 MG: 10 TABLET, FILM COATED ORAL at 17:30

## 2018-01-28 RX ADMIN — INSULIN ASPART 3 UNITS: 100 INJECTION, SOLUTION INTRAVENOUS; SUBCUTANEOUS at 11:41

## 2018-01-28 RX ADMIN — BUTALBITAL, ACETAMINOPHEN, AND CAFFEINE 2 TABLET: 50; 325; 40 TABLET ORAL at 15:50

## 2018-01-28 RX ADMIN — INSULIN ASPART 15 UNITS: 100 INJECTION, SOLUTION INTRAVENOUS; SUBCUTANEOUS at 11:40

## 2018-01-28 RX ADMIN — PRAMIPEXOLE DIHYDROCHLORIDE 0.75 MG: 0.5 TABLET ORAL at 20:47

## 2018-01-28 RX ADMIN — RANOLAZINE 1000 MG: 500 TABLET, FILM COATED, EXTENDED RELEASE ORAL at 07:24

## 2018-01-28 RX ADMIN — METOPROLOL SUCCINATE 200 MG: 100 TABLET, EXTENDED RELEASE ORAL at 07:24

## 2018-01-28 RX ADMIN — INSULIN DETEMIR 35 UNITS: 100 INJECTION, SOLUTION SUBCUTANEOUS at 20:48

## 2018-01-28 RX ADMIN — ACETAMINOPHEN 650 MG: 325 TABLET ORAL at 04:14

## 2018-01-28 RX ADMIN — TRAZODONE HYDROCHLORIDE 300 MG: 150 TABLET ORAL at 20:48

## 2018-01-28 RX ADMIN — INSULIN ASPART 3 UNITS: 100 INJECTION, SOLUTION INTRAVENOUS; SUBCUTANEOUS at 17:31

## 2018-01-29 VITALS
TEMPERATURE: 97.3 F | BODY MASS INDEX: 44.1 KG/M2 | OXYGEN SATURATION: 93 % | DIASTOLIC BLOOD PRESSURE: 53 MMHG | HEART RATE: 55 BPM | RESPIRATION RATE: 20 BRPM | HEIGHT: 71 IN | SYSTOLIC BLOOD PRESSURE: 133 MMHG | WEIGHT: 315 LBS

## 2018-01-29 LAB
ALBUMIN SERPL-MCNC: 3.5 G/DL (ref 3.4–4.8)
ALBUMIN/GLOB SERPL: 1 G/DL (ref 1.1–1.8)
ALP SERPL-CCNC: 67 U/L (ref 38–126)
ALT SERPL W P-5'-P-CCNC: 39 U/L (ref 21–72)
ANION GAP SERPL CALCULATED.3IONS-SCNC: 10 MMOL/L (ref 5–15)
AST SERPL-CCNC: 32 U/L (ref 17–59)
BACTERIA SPEC AEROBE CULT: NORMAL
BACTERIA SPEC AEROBE CULT: NORMAL
BASOPHILS # BLD AUTO: 0.01 10*3/MM3 (ref 0–0.2)
BASOPHILS NFR BLD AUTO: 0.1 % (ref 0–2)
BILIRUB SERPL-MCNC: 0.3 MG/DL (ref 0.2–1.3)
BUN BLD-MCNC: 12 MG/DL (ref 7–21)
BUN/CREAT SERPL: 16 (ref 7–25)
CALCIUM SPEC-SCNC: 8.9 MG/DL (ref 8.4–10.2)
CHLORIDE SERPL-SCNC: 99 MMOL/L (ref 95–110)
CO2 SERPL-SCNC: 27 MMOL/L (ref 22–31)
CREAT BLD-MCNC: 0.75 MG/DL (ref 0.7–1.3)
DEPRECATED RDW RBC AUTO: 44.3 FL (ref 35.1–43.9)
EOSINOPHIL # BLD AUTO: 0.28 10*3/MM3 (ref 0–0.7)
EOSINOPHIL NFR BLD AUTO: 4.1 % (ref 0–7)
ERYTHROCYTE [DISTWIDTH] IN BLOOD BY AUTOMATED COUNT: 14.9 % (ref 11.5–14.5)
GFR SERPL CREATININE-BSD FRML MDRD: 116 ML/MIN/1.73 (ref 70–162)
GLOBULIN UR ELPH-MCNC: 3.4 GM/DL (ref 2.3–3.5)
GLUCOSE BLD-MCNC: 207 MG/DL (ref 60–100)
GLUCOSE BLDC GLUCOMTR-MCNC: 154 MG/DL (ref 70–130)
GLUCOSE BLDC GLUCOMTR-MCNC: 198 MG/DL (ref 70–130)
HCT VFR BLD AUTO: 35.8 % (ref 39–49)
HGB BLD-MCNC: 11.9 G/DL (ref 13.7–17.3)
IMM GRANULOCYTES # BLD: 0.11 10*3/MM3 (ref 0–0.02)
IMM GRANULOCYTES NFR BLD: 1.6 % (ref 0–0.5)
LYMPHOCYTES # BLD AUTO: 1.43 10*3/MM3 (ref 0.6–4.2)
LYMPHOCYTES NFR BLD AUTO: 21.2 % (ref 10–50)
MCH RBC QN AUTO: 27.2 PG (ref 26.5–34)
MCHC RBC AUTO-ENTMCNC: 33.2 G/DL (ref 31.5–36.3)
MCV RBC AUTO: 81.9 FL (ref 80–98)
MONOCYTES # BLD AUTO: 1.04 10*3/MM3 (ref 0–0.9)
MONOCYTES NFR BLD AUTO: 15.4 % (ref 0–12)
NEUTROPHILS # BLD AUTO: 3.89 10*3/MM3 (ref 2–8.6)
NEUTROPHILS NFR BLD AUTO: 57.6 % (ref 37–80)
PLATELET # BLD AUTO: 152 10*3/MM3 (ref 150–450)
PMV BLD AUTO: 9 FL (ref 8–12)
POTASSIUM BLD-SCNC: 4.1 MMOL/L (ref 3.5–5.1)
PROT SERPL-MCNC: 6.9 G/DL (ref 6.3–8.6)
RBC # BLD AUTO: 4.37 10*6/MM3 (ref 4.37–5.74)
SODIUM BLD-SCNC: 136 MMOL/L (ref 137–145)
WBC NRBC COR # BLD: 6.76 10*3/MM3 (ref 3.2–9.8)

## 2018-01-29 PROCEDURE — 99217 PR OBSERVATION CARE DISCHARGE MANAGEMENT: CPT | Performed by: STUDENT IN AN ORGANIZED HEALTH CARE EDUCATION/TRAINING PROGRAM

## 2018-01-29 PROCEDURE — 96376 TX/PRO/DX INJ SAME DRUG ADON: CPT

## 2018-01-29 PROCEDURE — G0378 HOSPITAL OBSERVATION PER HR: HCPCS

## 2018-01-29 PROCEDURE — 63710000001 INSULIN ASPART PER 5 UNITS: Performed by: STUDENT IN AN ORGANIZED HEALTH CARE EDUCATION/TRAINING PROGRAM

## 2018-01-29 PROCEDURE — 82962 GLUCOSE BLOOD TEST: CPT

## 2018-01-29 PROCEDURE — 25010000002 CEFTRIAXONE PER 250 MG: Performed by: FAMILY MEDICINE

## 2018-01-29 PROCEDURE — 85025 COMPLETE CBC W/AUTO DIFF WBC: CPT | Performed by: FAMILY MEDICINE

## 2018-01-29 PROCEDURE — 80053 COMPREHEN METABOLIC PANEL: CPT | Performed by: FAMILY MEDICINE

## 2018-01-29 RX ORDER — LEVOFLOXACIN 750 MG/1
750 TABLET ORAL DAILY
Qty: 5 TABLET | Refills: 0 | Status: SHIPPED | OUTPATIENT
Start: 2018-01-29 | End: 2018-02-08

## 2018-01-29 RX ADMIN — METOPROLOL SUCCINATE 200 MG: 100 TABLET, EXTENDED RELEASE ORAL at 08:08

## 2018-01-29 RX ADMIN — SERTRALINE HYDROCHLORIDE 25 MG: 25 TABLET ORAL at 08:08

## 2018-01-29 RX ADMIN — INSULIN ASPART 15 UNITS: 100 INJECTION, SOLUTION INTRAVENOUS; SUBCUTANEOUS at 08:11

## 2018-01-29 RX ADMIN — INSULIN ASPART 3 UNITS: 100 INJECTION, SOLUTION INTRAVENOUS; SUBCUTANEOUS at 08:10

## 2018-01-29 RX ADMIN — CLOPIDOGREL BISULFATE 75 MG: 75 TABLET ORAL at 08:09

## 2018-01-29 RX ADMIN — INSULIN ASPART 3 UNITS: 100 INJECTION, SOLUTION INTRAVENOUS; SUBCUTANEOUS at 11:15

## 2018-01-29 RX ADMIN — CEFTRIAXONE SODIUM 1 G: 1 INJECTION, POWDER, FOR SOLUTION INTRAMUSCULAR; INTRAVENOUS at 11:13

## 2018-01-29 RX ADMIN — INSULIN ASPART 15 UNITS: 100 INJECTION, SOLUTION INTRAVENOUS; SUBCUTANEOUS at 11:14

## 2018-01-29 RX ADMIN — LISINOPRIL 40 MG: 40 TABLET ORAL at 05:15

## 2018-01-29 RX ADMIN — ATORVASTATIN CALCIUM 20 MG: 20 TABLET, FILM COATED ORAL at 08:09

## 2018-01-29 RX ADMIN — ISOSORBIDE MONONITRATE 120 MG: 60 TABLET, EXTENDED RELEASE ORAL at 08:09

## 2018-01-29 RX ADMIN — RANOLAZINE 1000 MG: 500 TABLET, FILM COATED, EXTENDED RELEASE ORAL at 08:08

## 2018-01-31 DIAGNOSIS — E11.65 TYPE 2 DIABETES MELLITUS WITH HYPERGLYCEMIA, WITHOUT LONG-TERM CURRENT USE OF INSULIN (HCC): ICD-10-CM

## 2018-02-05 ENCOUNTER — TELEPHONE (OUTPATIENT)
Dept: FAMILY MEDICINE CLINIC | Facility: CLINIC | Age: 38
End: 2018-02-05

## 2018-02-05 DIAGNOSIS — E11.65 TYPE 2 DIABETES MELLITUS WITH HYPERGLYCEMIA, WITHOUT LONG-TERM CURRENT USE OF INSULIN (HCC): ICD-10-CM

## 2018-02-05 RX ORDER — CLOPIDOGREL BISULFATE 75 MG/1
75 TABLET ORAL DAILY
Qty: 30 TABLET | Refills: 3 | Status: ON HOLD | OUTPATIENT
Start: 2018-02-05 | End: 2018-10-01

## 2018-02-05 RX ORDER — CLOPIDOGREL BISULFATE 75 MG/1
75 TABLET ORAL DAILY
Qty: 30 TABLET | Refills: 3 | Status: SHIPPED | OUTPATIENT
Start: 2018-02-05 | End: 2018-02-05 | Stop reason: SDUPTHER

## 2018-02-05 RX ORDER — ISOSORBIDE MONONITRATE 120 MG/1
TABLET, EXTENDED RELEASE ORAL
Qty: 30 TABLET | Refills: 0 | Status: SHIPPED | OUTPATIENT
Start: 2018-02-05 | End: 2018-09-07 | Stop reason: SDUPTHER

## 2018-02-08 ENCOUNTER — OFFICE VISIT (OUTPATIENT)
Dept: FAMILY MEDICINE CLINIC | Facility: CLINIC | Age: 38
End: 2018-02-08

## 2018-02-08 VITALS
OXYGEN SATURATION: 97 % | DIASTOLIC BLOOD PRESSURE: 74 MMHG | TEMPERATURE: 97.3 F | BODY MASS INDEX: 44.1 KG/M2 | HEART RATE: 69 BPM | HEIGHT: 71 IN | WEIGHT: 315 LBS | SYSTOLIC BLOOD PRESSURE: 136 MMHG | RESPIRATION RATE: 24 BRPM

## 2018-02-08 DIAGNOSIS — R10.9 LEFT FLANK PAIN: ICD-10-CM

## 2018-02-08 DIAGNOSIS — E11.65 TYPE 2 DIABETES MELLITUS WITH HYPERGLYCEMIA, WITHOUT LONG-TERM CURRENT USE OF INSULIN (HCC): ICD-10-CM

## 2018-02-08 DIAGNOSIS — I10 ESSENTIAL HYPERTENSION: ICD-10-CM

## 2018-02-08 DIAGNOSIS — I25.119 CORONARY ARTERY DISEASE INVOLVING NATIVE CORONARY ARTERY OF NATIVE HEART WITH ANGINA PECTORIS (HCC): ICD-10-CM

## 2018-02-08 DIAGNOSIS — R16.1 SPLENOMEGALY: ICD-10-CM

## 2018-02-08 DIAGNOSIS — D68.2 FACTOR II DEFICIENCY (HCC): ICD-10-CM

## 2018-02-08 DIAGNOSIS — IMO0001 CLASS 3 OBESITY DUE TO EXCESS CALORIES WITH SERIOUS COMORBIDITY AND BODY MASS INDEX (BMI) OF 60.0 TO 69.9 IN ADULT: ICD-10-CM

## 2018-02-08 DIAGNOSIS — R16.0 HEPATOMEGALY: Primary | ICD-10-CM

## 2018-02-08 LAB
BILIRUB BLD-MCNC: NEGATIVE MG/DL
CLARITY, POC: CLEAR
COLOR UR: YELLOW
GLUCOSE UR STRIP-MCNC: ABNORMAL MG/DL
KETONES UR QL: NEGATIVE
LEUKOCYTE EST, POC: NEGATIVE
NITRITE UR-MCNC: NEGATIVE MG/ML
PH UR: 5.5 [PH] (ref 5–8)
PROT UR STRIP-MCNC: ABNORMAL MG/DL
RBC # UR STRIP: NEGATIVE /UL
SP GR UR: 1.03 (ref 1–1.03)
UROBILINOGEN UR QL: NORMAL

## 2018-02-08 PROCEDURE — 81003 URINALYSIS AUTO W/O SCOPE: CPT | Performed by: NURSE PRACTITIONER

## 2018-02-08 PROCEDURE — 99214 OFFICE O/P EST MOD 30 MIN: CPT | Performed by: NURSE PRACTITIONER

## 2018-02-08 NOTE — PROGRESS NOTES
Chief Complaint   Patient presents with   • Diabetes       Subjective   Yordy Hansen is a 37 y.o. male presents to the office for evaluation of T2DM.    [facility]  Cardiology:CAD   Takes daily plavix   He is followed by Dr. Jenkins and LALA Vásquez- cardiology. Previously  followed by Dr. Boone. He had a cardiac stent placed in July 2016 and another  placed in 03/2017.     Recent PE 12/2017- was previously on xarelto, insurance would not cover and was  switched to eliquis. Followed by coumadin clinic. Now back on lifelong xarelto- if  not covered, will be placed on coumadin    T2DM- managed with metformin. 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 He did not bring his BG log with him.   He states his BG has been averaging upper 100s for FBG and pre-meal readings. Last week he mixed up his insulin and gave himself 35 units of novolog. Home health was present, Dr. Perez was called- patient was advised to drink juice and to monitor BG every 30 minutes until BG stabilizes, and advised to go to ER if he remains hypoglycemic   He has been tolerating his medications well, and aside from above detailed incident, he denies side effects. He denies both hyperglycemic and hypoglycemic symptoms.    He was informed by home health yesterday that his CT abd completed in the hospital showed and enlarged liver and an enlarged spleen. He is requesting to review imaging today.     He also states hospitalist told him he had diverticulosis.     He denies chest pain, bleeding, and difficulty breathing today.     He has been out of glucometer test strips for 2 weeks. He  states he is waiting on a PA.     New glucometer meter will be provided today.       Hospital records reviewed    HPI  Family History   Problem Relation Age of Onset   • Heart disease Mother    • Hypertension Mother    • Hyperlipidemia Mother    • Coronary artery disease Mother    • Cancer Paternal Grandfather      Social History     Social History   • Marital status:      Spouse name: Cori   • Number of children: 0   • Years of education: N/A     Occupational History   • Disabled      Social History Main Topics   • Smoking status: Former Smoker     Quit date: 1997   • Smokeless tobacco: Current User     Types: Snuff   • Alcohol use No   • Drug use: No   • Sexual activity: Yes     Partners: Female     Birth control/ protection: None     Other Topics Concern   • Not on file     Social History Narrative       The following portions of the patient's history were reviewed and updated as appropriate: allergies, current medications, past family history, past medical history, past social history, past surgical history and problem list.    Review of Systems   Constitutional: Negative for activity change, appetite change, chills, fatigue, fever and unexpected weight change.   HENT: Negative.  Negative for congestion, drooling, facial swelling, postnasal drip, rhinorrhea, sinus pressure, sore throat, trouble swallowing and voice change.    Eyes: Negative.  Negative for photophobia, pain, discharge and visual disturbance.   Respiratory: Negative for apnea, cough, choking and wheezing.    Cardiovascular: Negative for chest pain, palpitations and leg swelling.   Gastrointestinal: Negative.  Negative for abdominal distention, abdominal pain, constipation, diarrhea, nausea and vomiting.   Endocrine: Negative for polydipsia, polyphagia and polyuria.   Genitourinary: Positive for flank pain. Negative for decreased urine volume, difficulty urinating and dysuria.   Musculoskeletal: Negative for arthralgias, gait problem and  "myalgias.   Skin: Negative.  Negative for rash and wound.   Allergic/Immunologic: Negative.    Neurological: Negative.  Negative for dizziness, syncope, weakness, light-headedness, numbness and headaches.   Hematological: Negative.    Psychiatric/Behavioral: Negative.  Negative for confusion, decreased concentration and sleep disturbance. The patient is not nervous/anxious.      14 Point ROS completed with pertinent positives discussed. All other systems reviewed and are negative.       Objective   Encounter Vitals  /74  Pulse 69  Temp 97.3 °F (36.3 °C) (Tympanic)   Resp 24  Ht 180.3 cm (70.98\")  Wt (!) 215 kg (474 lb)  SpO2 97%  BMI 66.15 kg/m2  Vitals:    02/08/18 1305   BP: 136/74   Pulse: 69   Resp: 24   Temp: 97.3 °F (36.3 °C)   TempSrc: Tympanic   SpO2: 97%   Weight: (!) 215 kg (474 lb)   Height: 180.3 cm (70.98\")       Physical Exam   Constitutional: He is oriented to person, place, and time. Vital signs are normal. He appears well-developed and well-nourished.  Non-toxic appearance.   Morbid obesity   HENT:   Head: Normocephalic and atraumatic.   Right Ear: Tympanic membrane, external ear and ear canal normal.   Left Ear: Tympanic membrane, external ear and ear canal normal.   Nose: Nose normal.   Mouth/Throat: Uvula is midline, oropharynx is clear and moist and mucous membranes are normal. No posterior oropharyngeal edema or posterior oropharyngeal erythema.   Eyes: Lids are normal. Pupils are equal, round, and reactive to light.   Neck: Trachea normal and normal range of motion. Neck supple. No thyroid mass present.   Cardiovascular: Normal rate, regular rhythm, S1 normal, S2 normal, normal heart sounds and intact distal pulses.  PMI is displaced.  Exam reveals no gallop and no friction rub.    No murmur heard.  Elevated BP   Pulmonary/Chest: Effort normal and breath sounds normal. No respiratory distress. He has no wheezes. He has no rhonchi. He has no rales.   Abdominal: Soft. Normal " appearance and bowel sounds are normal. He exhibits mass. There is no rebound and no guarding.       Hardened area to RUQ   Musculoskeletal: Normal range of motion.   Lymphadenopathy:     He has no cervical adenopathy.   Neurological: He is alert and oriented to person, place, and time. He has normal strength. No cranial nerve deficit or sensory deficit. Gait normal.   Skin: Skin is warm, dry and intact. No rash noted.   Psychiatric: He has a normal mood and affect. His speech is normal and behavior is normal. Judgment and thought content normal. Cognition and memory are normal.   Nursing note and vitals reviewed.      Pertinent Labs      Key Imaging/Tracings/POC Testing  Brief Urine Lab Results  (Last result in the past 365 days)      Color   Clarity   Blood   Leuk Est   Nitrite   Protein   CREAT   Urine HCG        02/08/18 1359 Yellow Clear Negative Negative Negative 2+(A)             Assessment and Medications  Problems Addressed this Visit        Cardiovascular and Mediastinum    Essential hypertension    Coronary artery disease involving native coronary artery of native heart       Endocrine    Type 2 diabetes mellitus with hyperglycemia, without long-term current use of insulin    Relevant Medications    linagliptin (TRADJENTA) 5 MG tablet tablet    glucose blood test strip       Hematopoietic and Hemostatic    Factor II deficiency       Other    Class 3 obesity due to excess calories with serious comorbidity and body mass index (BMI) of 60.0 to 69.9 in adult      Other Visit Diagnoses     Hepatomegaly    -  Primary    Relevant Orders    US Abdomen Complete    Splenomegaly        Relevant Orders    US Abdomen Complete    Left flank pain        Relevant Orders    POC Urinalysis Dipstick, Automated (Completed)    Urine Culture - Urine, Urine, Clean Catch        Side effects of ordered medications discussed with patient.     Plan/Additional Notes/Instructions  Plan   1. abd u/s ordered tbc at [facility]-  hepatomegaly, splenomegaly; suspect fatty liver  2. Will likely have to refer to GI  3. Start trajenta for T2DM  4. Discussed monitoring of BG- FBG and pre-meal  5. Patient to notify staff of any BG <130, will start taper of insulin as needed  6. Ok to start decrease of insulin by 2 units when BG <130; call to notify staff before decreasing  7. Discussed s/sx of hypoglycemia and reviewed how to correct if needed. Call 911 with any hypoglycemia concerns, ruby with any readings <60  8. F/u after completion of u/s- BRING BG LOG  9. If you experience respiratory distress, chest pain, confusion, syncope, or feelings of impending doom, call 911 or have someone drive you to the nearest ER.  10. Keep upcoming cardiothoracic appt-       Follow-Up  Return in about 2 weeks (around 2/22/2018), or if symptoms worsen or fail to improve, for After ordered studies are completed.    Patient/caregiver verbalizes understanding of all orders and instructions in this plan of care.           This document has been electronically signed by LALA Barajas on February 8, 2018 5:04 PM

## 2018-02-09 ENCOUNTER — APPOINTMENT (OUTPATIENT)
Dept: GENERAL RADIOLOGY | Facility: HOSPITAL | Age: 38
End: 2018-02-09

## 2018-02-09 ENCOUNTER — HOSPITAL ENCOUNTER (OUTPATIENT)
Facility: HOSPITAL | Age: 38
Setting detail: OBSERVATION
Discharge: HOME | End: 2018-02-10
Attending: EMERGENCY MEDICINE | Admitting: INTERNAL MEDICINE

## 2018-02-09 ENCOUNTER — APPOINTMENT (OUTPATIENT)
Dept: CT IMAGING | Facility: HOSPITAL | Age: 38
End: 2018-02-09

## 2018-02-09 DIAGNOSIS — R51.9 ACUTE NONINTRACTABLE HEADACHE, UNSPECIFIED HEADACHE TYPE: ICD-10-CM

## 2018-02-09 DIAGNOSIS — R07.9 CHEST PAIN, UNSPECIFIED TYPE: Primary | ICD-10-CM

## 2018-02-09 LAB
ALBUMIN SERPL-MCNC: 4 G/DL (ref 3.4–4.8)
ALBUMIN/GLOB SERPL: 1.1 G/DL (ref 1.1–1.8)
ALP SERPL-CCNC: 66 U/L (ref 38–126)
ALT SERPL W P-5'-P-CCNC: 36 U/L (ref 21–72)
ANION GAP SERPL CALCULATED.3IONS-SCNC: 13 MMOL/L (ref 5–15)
APTT PPP: 32.9 SECONDS (ref 20–40.3)
AST SERPL-CCNC: 27 U/L (ref 17–59)
BASOPHILS # BLD AUTO: 0.01 10*3/MM3 (ref 0–0.2)
BASOPHILS NFR BLD AUTO: 0.1 % (ref 0–2)
BILIRUB SERPL-MCNC: 0.2 MG/DL (ref 0.2–1.3)
BUN BLD-MCNC: 12 MG/DL (ref 7–21)
BUN/CREAT SERPL: 17.9 (ref 7–25)
CALCIUM SPEC-SCNC: 9.2 MG/DL (ref 8.4–10.2)
CHLORIDE SERPL-SCNC: 101 MMOL/L (ref 95–110)
CK MB SERPL-CCNC: 2.14 NG/ML (ref 0–5)
CK SERPL-CCNC: 240 U/L (ref 55–170)
CO2 SERPL-SCNC: 25 MMOL/L (ref 22–31)
CREAT BLD-MCNC: 0.67 MG/DL (ref 0.7–1.3)
D-DIMER, QUANTITATIVE (MAD,POW, STR): <270 NG/ML (FEU) (ref 0–470)
DEPRECATED RDW RBC AUTO: 41.9 FL (ref 35.1–43.9)
EOSINOPHIL # BLD AUTO: 0.24 10*3/MM3 (ref 0–0.7)
EOSINOPHIL NFR BLD AUTO: 3.2 % (ref 0–7)
ERYTHROCYTE [DISTWIDTH] IN BLOOD BY AUTOMATED COUNT: 14.2 % (ref 11.5–14.5)
GFR SERPL CREATININE-BSD FRML MDRD: 132 ML/MIN/1.73 (ref 70–162)
GLOBULIN UR ELPH-MCNC: 3.5 GM/DL (ref 2.3–3.5)
GLUCOSE BLD-MCNC: 159 MG/DL (ref 60–100)
GLUCOSE BLDC GLUCOMTR-MCNC: 217 MG/DL (ref 70–130)
HCT VFR BLD AUTO: 38.3 % (ref 39–49)
HGB BLD-MCNC: 13 G/DL (ref 13.7–17.3)
HOLD SPECIMEN: NORMAL
HOLD SPECIMEN: NORMAL
IMM GRANULOCYTES # BLD: 0.14 10*3/MM3 (ref 0–0.02)
IMM GRANULOCYTES NFR BLD: 1.9 % (ref 0–0.5)
INR PPP: 0.96 (ref 0.8–1.2)
LYMPHOCYTES # BLD AUTO: 1.46 10*3/MM3 (ref 0.6–4.2)
LYMPHOCYTES NFR BLD AUTO: 19.5 % (ref 10–50)
MCH RBC QN AUTO: 27.4 PG (ref 26.5–34)
MCHC RBC AUTO-ENTMCNC: 33.9 G/DL (ref 31.5–36.3)
MCV RBC AUTO: 80.8 FL (ref 80–98)
MONOCYTES # BLD AUTO: 0.65 10*3/MM3 (ref 0–0.9)
MONOCYTES NFR BLD AUTO: 8.7 % (ref 0–12)
NEUTROPHILS # BLD AUTO: 4.99 10*3/MM3 (ref 2–8.6)
NEUTROPHILS NFR BLD AUTO: 66.6 % (ref 37–80)
NT-PROBNP SERPL-MCNC: 62.4 PG/ML (ref 0–450)
PLATELET # BLD AUTO: 183 10*3/MM3 (ref 150–450)
PMV BLD AUTO: 8.8 FL (ref 8–12)
POTASSIUM BLD-SCNC: 3.9 MMOL/L (ref 3.5–5.1)
PROT SERPL-MCNC: 7.5 G/DL (ref 6.3–8.6)
PROTHROMBIN TIME: 12.7 SECONDS (ref 11.1–15.3)
RBC # BLD AUTO: 4.74 10*6/MM3 (ref 4.37–5.74)
SODIUM BLD-SCNC: 139 MMOL/L (ref 137–145)
TROPONIN I SERPL-MCNC: <0.012 NG/ML
TROPONIN I SERPL-MCNC: <0.012 NG/ML
WBC NRBC COR # BLD: 7.49 10*3/MM3 (ref 3.2–9.8)
WHOLE BLOOD HOLD SPECIMEN: NORMAL
WHOLE BLOOD HOLD SPECIMEN: NORMAL

## 2018-02-09 PROCEDURE — 83036 HEMOGLOBIN GLYCOSYLATED A1C: CPT | Performed by: INTERNAL MEDICINE

## 2018-02-09 PROCEDURE — 82962 GLUCOSE BLOOD TEST: CPT

## 2018-02-09 PROCEDURE — 84484 ASSAY OF TROPONIN QUANT: CPT | Performed by: EMERGENCY MEDICINE

## 2018-02-09 PROCEDURE — 96375 TX/PRO/DX INJ NEW DRUG ADDON: CPT

## 2018-02-09 PROCEDURE — 85730 THROMBOPLASTIN TIME PARTIAL: CPT | Performed by: EMERGENCY MEDICINE

## 2018-02-09 PROCEDURE — 82550 ASSAY OF CK (CPK): CPT | Performed by: EMERGENCY MEDICINE

## 2018-02-09 PROCEDURE — 85610 PROTHROMBIN TIME: CPT | Performed by: EMERGENCY MEDICINE

## 2018-02-09 PROCEDURE — 96361 HYDRATE IV INFUSION ADD-ON: CPT

## 2018-02-09 PROCEDURE — 87086 URINE CULTURE/COLONY COUNT: CPT | Performed by: NURSE PRACTITIONER

## 2018-02-09 PROCEDURE — 96374 THER/PROPH/DIAG INJ IV PUSH: CPT

## 2018-02-09 PROCEDURE — 70450 CT HEAD/BRAIN W/O DYE: CPT

## 2018-02-09 PROCEDURE — 82553 CREATINE MB FRACTION: CPT | Performed by: EMERGENCY MEDICINE

## 2018-02-09 PROCEDURE — G0378 HOSPITAL OBSERVATION PER HR: HCPCS

## 2018-02-09 PROCEDURE — 80053 COMPREHEN METABOLIC PANEL: CPT | Performed by: EMERGENCY MEDICINE

## 2018-02-09 PROCEDURE — 71046 X-RAY EXAM CHEST 2 VIEWS: CPT

## 2018-02-09 PROCEDURE — 99284 EMERGENCY DEPT VISIT MOD MDM: CPT

## 2018-02-09 PROCEDURE — 85379 FIBRIN DEGRADATION QUANT: CPT | Performed by: EMERGENCY MEDICINE

## 2018-02-09 PROCEDURE — 85025 COMPLETE CBC W/AUTO DIFF WBC: CPT | Performed by: EMERGENCY MEDICINE

## 2018-02-09 PROCEDURE — 25010000002 BUTORPHANOL PER 1 MG: Performed by: EMERGENCY MEDICINE

## 2018-02-09 PROCEDURE — 93005 ELECTROCARDIOGRAM TRACING: CPT

## 2018-02-09 PROCEDURE — 83880 ASSAY OF NATRIURETIC PEPTIDE: CPT | Performed by: EMERGENCY MEDICINE

## 2018-02-09 RX ORDER — NICOTINE POLACRILEX 4 MG
15 LOZENGE BUCCAL
Status: DISCONTINUED | OUTPATIENT
Start: 2018-02-09 | End: 2018-02-10 | Stop reason: HOSPADM

## 2018-02-09 RX ORDER — NITROGLYCERIN 0.4 MG/1
0.4 TABLET SUBLINGUAL
Status: DISCONTINUED | OUTPATIENT
Start: 2018-02-09 | End: 2018-02-10 | Stop reason: HOSPADM

## 2018-02-09 RX ORDER — HYDROCODONE BITARTRATE AND ACETAMINOPHEN 5; 325 MG/1; MG/1
1 TABLET ORAL ONCE
Status: COMPLETED | OUTPATIENT
Start: 2018-02-09 | End: 2018-02-09

## 2018-02-09 RX ORDER — SODIUM CHLORIDE 0.9 % (FLUSH) 0.9 %
10 SYRINGE (ML) INJECTION AS NEEDED
Status: DISCONTINUED | OUTPATIENT
Start: 2018-02-09 | End: 2018-02-10 | Stop reason: HOSPADM

## 2018-02-09 RX ORDER — DEXTROSE MONOHYDRATE 25 G/50ML
25 INJECTION, SOLUTION INTRAVENOUS
Status: DISCONTINUED | OUTPATIENT
Start: 2018-02-09 | End: 2018-02-10 | Stop reason: HOSPADM

## 2018-02-09 RX ORDER — SODIUM CHLORIDE 9 MG/ML
125 INJECTION, SOLUTION INTRAVENOUS CONTINUOUS
Status: DISCONTINUED | OUTPATIENT
Start: 2018-02-09 | End: 2018-02-10

## 2018-02-09 RX ORDER — ASPIRIN 81 MG/1
324 TABLET, CHEWABLE ORAL ONCE
Status: COMPLETED | OUTPATIENT
Start: 2018-02-09 | End: 2018-02-09

## 2018-02-09 RX ADMIN — ASPIRIN 81 MG 324 MG: 81 TABLET ORAL at 22:26

## 2018-02-09 RX ADMIN — NITROGLYCERIN 0.4 MG: 0.4 TABLET SUBLINGUAL at 20:45

## 2018-02-09 RX ADMIN — BUTORPHANOL TARTRATE 1 MG: 2 INJECTION, SOLUTION INTRAMUSCULAR; INTRAVENOUS at 22:27

## 2018-02-09 RX ADMIN — SODIUM CHLORIDE 125 ML/HR: 9 INJECTION, SOLUTION INTRAVENOUS at 20:44

## 2018-02-09 RX ADMIN — HYDROCODONE BITARTRATE AND ACETAMINOPHEN 1 TABLET: 5; 325 TABLET ORAL at 20:45

## 2018-02-10 VITALS
BODY MASS INDEX: 44.1 KG/M2 | HEIGHT: 71 IN | SYSTOLIC BLOOD PRESSURE: 130 MMHG | DIASTOLIC BLOOD PRESSURE: 58 MMHG | RESPIRATION RATE: 18 BRPM | WEIGHT: 315 LBS | TEMPERATURE: 97.8 F | OXYGEN SATURATION: 97 % | HEART RATE: 74 BPM

## 2018-02-10 LAB
GLUCOSE BLDC GLUCOMTR-MCNC: 178 MG/DL (ref 70–130)
GLUCOSE BLDC GLUCOMTR-MCNC: 223 MG/DL (ref 70–130)
HBA1C MFR BLD: 9.4 % (ref 4–5.6)

## 2018-02-10 PROCEDURE — 25010000002 LORAZEPAM PER 2 MG: Performed by: INTERNAL MEDICINE

## 2018-02-10 PROCEDURE — G0378 HOSPITAL OBSERVATION PER HR: HCPCS

## 2018-02-10 PROCEDURE — 63710000001 INSULIN DETEMIR PER 5 UNITS: Performed by: INTERNAL MEDICINE

## 2018-02-10 PROCEDURE — 94799 UNLISTED PULMONARY SVC/PX: CPT

## 2018-02-10 PROCEDURE — 93005 ELECTROCARDIOGRAM TRACING: CPT | Performed by: INTERNAL MEDICINE

## 2018-02-10 PROCEDURE — 63710000001 INSULIN ASPART PER 5 UNITS: Performed by: INTERNAL MEDICINE

## 2018-02-10 PROCEDURE — 94760 N-INVAS EAR/PLS OXIMETRY 1: CPT

## 2018-02-10 PROCEDURE — 94640 AIRWAY INHALATION TREATMENT: CPT

## 2018-02-10 PROCEDURE — 82962 GLUCOSE BLOOD TEST: CPT

## 2018-02-10 PROCEDURE — 96361 HYDRATE IV INFUSION ADD-ON: CPT

## 2018-02-10 PROCEDURE — 96375 TX/PRO/DX INJ NEW DRUG ADDON: CPT

## 2018-02-10 RX ORDER — ONDANSETRON 4 MG/1
4 TABLET, ORALLY DISINTEGRATING ORAL EVERY 6 HOURS PRN
Status: DISCONTINUED | OUTPATIENT
Start: 2018-02-10 | End: 2018-02-10 | Stop reason: HOSPADM

## 2018-02-10 RX ORDER — SODIUM CHLORIDE 9 MG/ML
100 INJECTION, SOLUTION INTRAVENOUS CONTINUOUS
Status: DISCONTINUED | OUTPATIENT
Start: 2018-02-10 | End: 2018-02-10 | Stop reason: HOSPADM

## 2018-02-10 RX ORDER — LISINOPRIL 40 MG/1
40 TABLET ORAL EVERY MORNING
Status: DISCONTINUED | OUTPATIENT
Start: 2018-02-10 | End: 2018-02-10 | Stop reason: HOSPADM

## 2018-02-10 RX ORDER — HYDROCODONE BITARTRATE AND ACETAMINOPHEN 10; 325 MG/1; MG/1
1 TABLET ORAL EVERY 6 HOURS PRN
Status: DISCONTINUED | OUTPATIENT
Start: 2018-02-10 | End: 2018-02-10 | Stop reason: HOSPADM

## 2018-02-10 RX ORDER — ATORVASTATIN CALCIUM 20 MG/1
20 TABLET, FILM COATED ORAL DAILY
Status: DISCONTINUED | OUTPATIENT
Start: 2018-02-10 | End: 2018-02-10 | Stop reason: HOSPADM

## 2018-02-10 RX ORDER — TRAZODONE HYDROCHLORIDE 150 MG/1
300 TABLET ORAL NIGHTLY
Status: DISCONTINUED | OUTPATIENT
Start: 2018-02-10 | End: 2018-02-10 | Stop reason: HOSPADM

## 2018-02-10 RX ORDER — IPRATROPIUM BROMIDE AND ALBUTEROL SULFATE 2.5; .5 MG/3ML; MG/3ML
3 SOLUTION RESPIRATORY (INHALATION)
Status: DISCONTINUED | OUTPATIENT
Start: 2018-02-10 | End: 2018-02-10 | Stop reason: HOSPADM

## 2018-02-10 RX ORDER — SERTRALINE HYDROCHLORIDE 25 MG/1
25 TABLET, FILM COATED ORAL DAILY
Status: DISCONTINUED | OUTPATIENT
Start: 2018-02-10 | End: 2018-02-10 | Stop reason: HOSPADM

## 2018-02-10 RX ORDER — BISACODYL 5 MG/1
5 TABLET, DELAYED RELEASE ORAL DAILY PRN
Status: DISCONTINUED | OUTPATIENT
Start: 2018-02-10 | End: 2018-02-10 | Stop reason: HOSPADM

## 2018-02-10 RX ORDER — AMLODIPINE BESYLATE 10 MG/1
10 TABLET ORAL NIGHTLY
Status: DISCONTINUED | OUTPATIENT
Start: 2018-02-10 | End: 2018-02-10 | Stop reason: HOSPADM

## 2018-02-10 RX ORDER — ISOSORBIDE MONONITRATE 60 MG/1
120 TABLET, EXTENDED RELEASE ORAL
Status: DISCONTINUED | OUTPATIENT
Start: 2018-02-10 | End: 2018-02-10 | Stop reason: HOSPADM

## 2018-02-10 RX ORDER — RANOLAZINE 500 MG/1
1000 TABLET, EXTENDED RELEASE ORAL EVERY 12 HOURS SCHEDULED
Status: DISCONTINUED | OUTPATIENT
Start: 2018-02-10 | End: 2018-02-10 | Stop reason: HOSPADM

## 2018-02-10 RX ORDER — SODIUM CHLORIDE 0.9 % (FLUSH) 0.9 %
1-10 SYRINGE (ML) INJECTION AS NEEDED
Status: DISCONTINUED | OUTPATIENT
Start: 2018-02-10 | End: 2018-02-10 | Stop reason: HOSPADM

## 2018-02-10 RX ORDER — PANTOPRAZOLE SODIUM 40 MG/1
40 TABLET, DELAYED RELEASE ORAL NIGHTLY
Status: DISCONTINUED | OUTPATIENT
Start: 2018-02-10 | End: 2018-02-10 | Stop reason: HOSPADM

## 2018-02-10 RX ORDER — ASPIRIN 81 MG/1
324 TABLET, CHEWABLE ORAL ONCE
Status: DISCONTINUED | OUTPATIENT
Start: 2018-02-10 | End: 2018-02-10

## 2018-02-10 RX ORDER — CLOPIDOGREL BISULFATE 75 MG/1
75 TABLET ORAL DAILY
Status: DISCONTINUED | OUTPATIENT
Start: 2018-02-10 | End: 2018-02-10 | Stop reason: HOSPADM

## 2018-02-10 RX ORDER — PRAMIPEXOLE DIHYDROCHLORIDE 1 MG/1
2 TABLET ORAL NIGHTLY
Status: DISCONTINUED | OUTPATIENT
Start: 2018-02-10 | End: 2018-02-10 | Stop reason: HOSPADM

## 2018-02-10 RX ORDER — ACETAMINOPHEN 325 MG/1
TABLET ORAL
Status: DISCONTINUED
Start: 2018-02-10 | End: 2018-02-10 | Stop reason: HOSPADM

## 2018-02-10 RX ORDER — METOPROLOL SUCCINATE 100 MG/1
200 TABLET, EXTENDED RELEASE ORAL DAILY
Status: DISCONTINUED | OUTPATIENT
Start: 2018-02-10 | End: 2018-02-10 | Stop reason: HOSPADM

## 2018-02-10 RX ORDER — LORAZEPAM 2 MG/ML
1 INJECTION INTRAMUSCULAR ONCE
Status: COMPLETED | OUTPATIENT
Start: 2018-02-10 | End: 2018-02-10

## 2018-02-10 RX ORDER — ACETAMINOPHEN 325 MG/1
650 TABLET ORAL EVERY 6 HOURS PRN
Status: DISCONTINUED | OUTPATIENT
Start: 2018-02-10 | End: 2018-02-10 | Stop reason: HOSPADM

## 2018-02-10 RX ORDER — ASPIRIN 81 MG/1
81 TABLET ORAL DAILY
Status: DISCONTINUED | OUTPATIENT
Start: 2018-02-11 | End: 2018-02-10 | Stop reason: HOSPADM

## 2018-02-10 RX ORDER — ASPIRIN 81 MG/1
81 TABLET ORAL DAILY
Qty: 30 TABLET | Refills: 0 | Status: SHIPPED | OUTPATIENT
Start: 2018-02-11 | End: 2018-03-19

## 2018-02-10 RX ORDER — BUTALBITAL, ACETAMINOPHEN AND CAFFEINE 50; 325; 40 MG/1; MG/1; MG/1
2 TABLET ORAL EVERY 4 HOURS PRN
Status: DISCONTINUED | OUTPATIENT
Start: 2018-02-10 | End: 2018-02-10 | Stop reason: HOSPADM

## 2018-02-10 RX ADMIN — SODIUM CHLORIDE 100 ML/HR: 9 INJECTION, SOLUTION INTRAVENOUS at 01:39

## 2018-02-10 RX ADMIN — PANTOPRAZOLE SODIUM 40 MG: 40 TABLET, DELAYED RELEASE ORAL at 01:29

## 2018-02-10 RX ADMIN — IPRATROPIUM BROMIDE AND ALBUTEROL SULFATE 3 ML: 2.5; .5 SOLUTION RESPIRATORY (INHALATION) at 11:04

## 2018-02-10 RX ADMIN — METOPROLOL SUCCINATE 200 MG: 100 TABLET, EXTENDED RELEASE ORAL at 08:44

## 2018-02-10 RX ADMIN — PRAMIPEXOLE DIHYDROCHLORIDE 2 MG: 1 TABLET ORAL at 01:28

## 2018-02-10 RX ADMIN — ATORVASTATIN CALCIUM 20 MG: 20 TABLET, FILM COATED ORAL at 08:44

## 2018-02-10 RX ADMIN — RIVAROXABAN 20 MG: 10 TABLET, FILM COATED ORAL at 08:44

## 2018-02-10 RX ADMIN — CLOPIDOGREL BISULFATE 75 MG: 75 TABLET ORAL at 08:44

## 2018-02-10 RX ADMIN — LINAGLIPTIN 5 MG: 5 TABLET, FILM COATED ORAL at 08:44

## 2018-02-10 RX ADMIN — ISOSORBIDE MONONITRATE 120 MG: 60 TABLET, EXTENDED RELEASE ORAL at 08:44

## 2018-02-10 RX ADMIN — INSULIN ASPART 5 UNITS: 100 INJECTION, SOLUTION INTRAVENOUS; SUBCUTANEOUS at 01:33

## 2018-02-10 RX ADMIN — INSULIN ASPART 3 UNITS: 100 INJECTION, SOLUTION INTRAVENOUS; SUBCUTANEOUS at 08:51

## 2018-02-10 RX ADMIN — INSULIN DETEMIR 35 UNITS: 100 INJECTION, SOLUTION SUBCUTANEOUS at 01:28

## 2018-02-10 RX ADMIN — HYDROCODONE BITARTRATE AND ACETAMINOPHEN 1 TABLET: 10; 325 TABLET ORAL at 08:43

## 2018-02-10 RX ADMIN — LISINOPRIL 40 MG: 40 TABLET ORAL at 08:44

## 2018-02-10 RX ADMIN — IPRATROPIUM BROMIDE AND ALBUTEROL SULFATE 3 ML: 2.5; .5 SOLUTION RESPIRATORY (INHALATION) at 07:47

## 2018-02-10 RX ADMIN — LORAZEPAM 1 MG: 2 INJECTION INTRAMUSCULAR; INTRAVENOUS at 03:38

## 2018-02-10 RX ADMIN — HYDROCODONE BITARTRATE AND ACETAMINOPHEN 1 TABLET: 10; 325 TABLET ORAL at 01:33

## 2018-02-10 RX ADMIN — TRAZODONE HYDROCHLORIDE 300 MG: 150 TABLET ORAL at 01:29

## 2018-02-10 RX ADMIN — RANOLAZINE 1000 MG: 500 TABLET, FILM COATED, EXTENDED RELEASE ORAL at 08:44

## 2018-02-10 RX ADMIN — RANOLAZINE 1000 MG: 500 TABLET, FILM COATED, EXTENDED RELEASE ORAL at 01:29

## 2018-02-10 RX ADMIN — INSULIN ASPART 5 UNITS: 100 INJECTION, SOLUTION INTRAVENOUS; SUBCUTANEOUS at 10:39

## 2018-02-10 RX ADMIN — BUTALBITAL, ACETAMINOPHEN, AND CAFFEINE 2 TABLET: 50; 325; 40 TABLET ORAL at 11:01

## 2018-02-10 RX ADMIN — SERTRALINE HYDROCHLORIDE 25 MG: 25 TABLET ORAL at 08:44

## 2018-02-10 RX ADMIN — METFORMIN HYDROCHLORIDE 1000 MG: 500 TABLET ORAL at 08:44

## 2018-02-10 RX ADMIN — AMLODIPINE BESYLATE 10 MG: 10 TABLET ORAL at 01:29

## 2018-02-10 RX ADMIN — INSULIN ASPART 15 UNITS: 100 INJECTION, SOLUTION INTRAVENOUS; SUBCUTANEOUS at 10:39

## 2018-02-10 RX ADMIN — INSULIN ASPART 15 UNITS: 100 INJECTION, SOLUTION INTRAVENOUS; SUBCUTANEOUS at 08:53

## 2018-02-10 NOTE — PLAN OF CARE
Problem: Patient Care Overview (Adult)  Goal: Plan of Care Review  Outcome: Ongoing (interventions implemented as appropriate)   02/10/18 0343   Coping/Psychosocial Response Interventions   Plan Of Care Reviewed With patient   Patient Care Overview   Progress no change   Outcome Evaluation   Outcome Summary/Follow up Plan pt wants more pain medicaiton     Goal: Adult Individualization and Mutuality  Outcome: Ongoing (interventions implemented as appropriate)      Problem: Arrhythmia/Dysrhythmia (Symptomatic) (Adult)  Goal: Signs and Symptoms of Listed Potential Problems Will be Absent or Manageable (Arrhythmia/Dysrhythmia)  Outcome: Ongoing (interventions implemented as appropriate)      Problem: Acute Coronary Syndrome (ACS) (Adult)  Goal: Signs and Symptoms of Listed Potential Problems Will be Absent or Manageable (Acute Coronary Syndrome)  Outcome: Ongoing (interventions implemented as appropriate)      Problem: Pain, Acute (Adult)  Goal: Identify Related Risk Factors and Signs and Symptoms  Outcome: Ongoing (interventions implemented as appropriate)    Goal: Acceptable Pain Control/Comfort Level  Outcome: Ongoing (interventions implemented as appropriate)

## 2018-02-10 NOTE — DISCHARGE SUMMARY
[facility] Medicine Services  DISCHARGE SUMMARY       Date of Admission: 2/9/2018  Date of Discharge:  2/10/2018  Primary Care Physician: LALA Barajas    Presenting Problem/History of Present Illness:  Acute nonintractable headache, unspecified headache type [R51]  Chest pain, unspecified type [R07.9]     Final Discharge Diagnoses:  Hospital Problem List     Chest pain          Consults:   Consults     Date and Time Order Name Status Description    2/9/2018 2252 Inpatient Consult to Cardiology      2/9/2018 2212 Hospitalist (on-call MD unless specified)            Procedures Performed:                 Pertinent Test Results:    Laboratory Data:     Results from last 7 days  Lab Units 02/09/18 2000   SODIUM mmol/L 139   POTASSIUM mmol/L 3.9   CHLORIDE mmol/L 101   CO2 mmol/L 25.0   BUN mg/dL 12   CREATININE mg/dL 0.67*   GLUCOSE mg/dL 159*   CALCIUM mg/dL 9.2   BILIRUBIN mg/dL 0.2   ALK PHOS U/L 66   ALT (SGPT) U/L 36   AST (SGOT) U/L 27   ANION GAP mmol/L 13.0     Estimated Creatinine Clearance: 276.4 mL/min (by C-G formula based on Cr of 0.67).            Results from last 7 days  Lab Units 02/09/18 2000   WBC 10*3/mm3 7.49   HEMOGLOBIN g/dL 13.0*   HEMATOCRIT % 38.3*   PLATELETS 10*3/mm3 183       Results from last 7 days  Lab Units 02/09/18 2000   INR  0.96       Culture Data:   No results found for: BLOODCX  Urine Culture   Date Value Ref Range Status   02/09/2018 Culture in progress  Preliminary     No results found for: RESPCX  No results found for: WOUNDCX  No results found for: STOOLCX  No components found for: BODYFLD    Micobiology                    Urine Culture   Date Value Ref Range Status   02/09/2018 Culture in progress  Preliminary          Radiology Data:   Imaging Results (all)     Procedure Component Value Units Date/Time    XR Chest 2 View [[id number]] Collected:  02/09/18 2021     Updated:  02/09/18 2046    Narrative:         EXAM:           Radiograph(s), Chest   VIEWS:    PA / Lat ; 2       DATE/TIME:  2/9/2018 8:43 PM CST                INDICATION:   chest pain    COMPARISON:  CXR: 1/18/18             FINDINGS:             - lines/tubes:    none     - cardiac:         size within normal limits         - mediastinum: contour within normal limits         - lungs:         no focal air space process, pulmonary  interstitial edema, nodule(s)/mass             - pleura:         no evidence of  fluid                  - osseous:         unremarkable for age                  - misc.:         Impression:       CONCLUSION:        1. No evidence of an acute cardiopulmonary process.                                                  Electronically signed by:  MIGUEL A Soni MD  2/9/2018 8:44 PM  CST Workstation: [id number]    CT Head Without Contrast [[id number]] Collected:  02/09/18 2112     Updated:  02/09/18 2204    Narrative:       Exam: Head CT without contrast    INDICATION: Headache    TECHNIQUE: Routine head CT without contrast. Sagittal coronal  reconstructions were obtained. CT technique performed using  ALARA.    COMPARISON: 9/12/2014    FINDINGS: The bony calvarium is intact. The imaged paranasal  sinuses mastoid air cells are clear. No extra-axial fluid  collection. The ventricular system is normal. Normal gray-white  differentiation. No sign of acute infarction. No intraparenchymal  hemorrhage, mass or midline shift.      Impression:       No acute intracranial abnormality.    Electronically signed by:  Brennan Corrigan MD  2/9/2018 10:03 PM  CST Workstation: [id number]          Chief Complaint on Day of Discharge: none    HPI:Patient is a 37 y.o. male with multiple medical problems notably coronary artery disease status post PCI, morbid obesity, obstructive sleep apnea, hypertension, dyslipidemia, chronic pain syndrome, GERD, diabetes mellitus, previous pulmonary embolism, prothrombin mutation who presents with onset of right-sided pleuritic chest  pain and intermittent headaches associated with nausea and shortness of air.  He denies fever, chills, vomiting, cough, palpitations, syncope, presyncope, blurry vision or any lateralizing signs.    He had CT scan of the head in the emergency room which was unremarkable.  Troponin, EKG and chest x-ray were unremarkable.      Patient is known to our service for repeated admissions for chest pain.  He was discharged from the hospital about 10 days ago following an admission for GI bleed.  His last cardiac catheterization was in March 2017 that showed significant in-stent restenosis of the LAD for which he had a PCI.     Family and social history: Patient is  and lives with his wife.  He denies history of alcohol or tobacco use.  There is family history of hypertension and heart disease.  Hospital Course: trops negative. Patient evaluated by cardiology , discharged home asymptomatic and exam unremarkable, plan to follow up in 1 week with cardiology for lexiscan .    Condition on Discharge:  Improved and Stable    Physical Exam on Discharge:  Temp:  [96.8 °F (36 °C)-97.7 °F (36.5 °C)] 97.7 °F (36.5 °C)  Heart Rate:  [63-79] 72  Resp:  [18-24] 18  BP: (136-211)/() 145/63    Constitutional: Patient is AAO x 3. Patient appears well-developed and well-nourished.   Cardiovascular: Normal rate, regular rhythm, normal heart sounds and intact distal pulses.  Exam reveals no gallop and no friction rub.  No murmur heard.  Pulmonary/Chest: Effort normal and breath sounds normal. No respiratory distress. No wheezes, rales or ronchi.  Abdominal: Soft. Bowel sounds are normal. No distension, no tenderness. There is no rebound and no guarding. No hernia.   Musculoskeletal: No Cyanosis clubbing or edema.    Discharge Disposition:  Home or Self Care    Discharge Medications:   Yordy Hansen   Home Medication Instructions ROCKY:[id number]    Printed on:02/10/18 1111   Medication Information                       albuterol (ACCUNEB) 1.25 MG/3ML nebulizer solution  Take 3 mL by nebulization Every 6 (Six) Hours As Needed for Wheezing.             albuterol (PROVENTIL HFA;VENTOLIN HFA) 108 (90 BASE) MCG/ACT inhaler  Inhale 2 puffs Every 4 (Four) Hours As Needed for Wheezing.             amLODIPine (NORVASC) 10 MG tablet  Take 1 tablet by mouth Every Night.             aspirin 81 MG EC tablet  Take 1 tablet by mouth Daily.             clopidogrel (PLAVIX) 75 MG tablet  Take 1 tablet by mouth Daily.             glucose blood test strip  One touch Verio strips             glucose monitor monitoring kit  1 each As Needed (check blood glucose 3x daily).             HYDROcodone-acetaminophen (NORCO)  MG per tablet  Take 1 tablet by mouth Every 6 (Six) Hours As Needed for Moderate Pain .             insulin aspart (novoLOG) 100 UNIT/ML injection  Inject 15 Units under the skin 3 (Three) Times a Day Before Meals.             insulin detemir (LEVEMIR) 100 UNIT/ML injection  Inject 35 Units under the skin Every Night.             isosorbide mononitrate (IMDUR) 120 MG 24 hr tablet  TAKE ONE TABLET BY MOUTH ONCE DAILY             linagliptin (TRADJENTA) 5 MG tablet tablet  Take 1 tablet by mouth Daily.             lisinopril (PRINIVIL,ZESTRIL) 40 MG tablet  Take 1 tablet by mouth Every Morning.             metFORMIN (GLUCOPHAGE) 1000 MG tablet  Take 1 tablet by mouth 2 (Two) Times a Day With Meals.             metoprolol succinate XL (TOPROL XL) 200 MG 24 hr tablet  Take 1 tablet by mouth Daily.             MICROLET LANCETS misc  One touch delica lancets             nitroglycerin (NITROSTAT) 0.4 MG SL tablet  Place 1 tablet under the tongue Every 5 (Five) Minutes As Needed for Chest Pain. Take no more than 3 doses in 15 minutes.             ondansetron ODT (ZOFRAN-ODT) 4 MG disintegrating tablet  Take 1 tablet by mouth Every 6 (Six) Hours As Needed for Nausea or Vomiting.             pantoprazole (PROTONIX) 40 MG EC tablet  Take 1  "tablet by mouth Every Night.             pramipexole (MIRAPEX) 1 MG tablet  Take 2 tablets by mouth Every Night. Taking 2mg daily             pravastatin (PRAVACHOL) 80 MG tablet  Take 1 tablet by mouth Every Night.             ranolazine (RANEXA) 1000 MG 12 hr tablet  Take 1 tablet by mouth Every 12 (Twelve) Hours.             RELION INSULIN SYRINGE 31G X 15/64\" 0.5 ML misc               rivaroxaban (XARELTO) 20 MG tablet  Take 1 tablet by mouth Daily.             sertraline (ZOLOFT) 25 MG tablet  Take 1 tablet by mouth Daily.             traZODone (DESYREL) 300 MG tablet  Take 1 tablet by mouth Every Night.                 Discharge Diet:   Diet Instructions     Diet: Cardiac, Consistent Carbohydrate       Discharge Diet:   Cardiac  Consistent Carbohydrate                    Activity at Discharge:   Activity Instructions     Activity as Tolerated                     All the abnormal findings were discussed with the patient in details and the patient verbalized understanding of needing to follow up with PCP and and other specialities of  outpatient follow up for further evaluation and management.    Discharge Care Plan/Instructions: follow up with PCP in 2-3 days    Follow-up Appointments:   Future Appointments  Date Time Provider Department Center   2/14/2018 9:00 AM [facility] PWD US 1 [facility] US POW [place]   2/20/2018 1:20 PM LALA Valencia W CTV MAD None   3/8/2018 8:00 AM [facility] ECHO ROOM 1 [facility] CARD [place]   3/8/2018 9:30 AM Parul Currie MD [facility] PULM MAD None   3/8/2018 11:00 AM [facility] THAD ROOM [facility] VASC [place]   3/13/2018 2:00 PM Yordy Montelongo MD [facility] SM MAD None   5/9/2018 3:00 PM Antony Jenkins MD PhD [facility] CD MAD None       Test Results Pending at Discharge:      This document has been electronically signed by Antonio Barrett MD on February 10, 2018 11:11 AM          "

## 2018-02-10 NOTE — ED PROVIDER NOTES
Subjective   HPI Comments: 36yo male pmh significant htn/hyperlipidemia/dm2/cad/pe/david/prothrombin 18616x mutation, presents ED c/o 3d hx intermittent bitemporal 'sharp' headache, right sided pleuritic chest pain, soa.  ROS (+) nausea.  Denies trauma/syncope/hemoptysis/fever/chills/cough/abd pain/paresthesia/motor weakness.    Patient is a 37 y.o. male presenting with general illness.   Illness   Severity:  Moderate  Onset quality:  Gradual  Duration:  3 days  Timing:  Intermittent  Progression:  Waxing and waning  Chronicity:  New  Associated symptoms: chest pain, headaches and shortness of breath    Associated symptoms: no congestion and no cough        Review of Systems   Constitutional: Negative.    HENT: Negative for congestion.    Eyes: Negative.    Respiratory: Positive for shortness of breath. Negative for cough.    Cardiovascular: Positive for chest pain. Negative for palpitations and leg swelling.   Gastrointestinal: Negative.    Endocrine: Negative.    Genitourinary: Negative.    Musculoskeletal: Negative.    Skin: Negative.    Neurological: Positive for headaches.   All other systems reviewed and are negative.      Past Medical History:   Diagnosis Date   • Chest pain    • Chronic back pain    • Coronary artery disease    • Diabetes mellitus    • GERD (gastroesophageal reflux disease)    • Hyperlipidemia    • Hypertension    • Morbid obesity    • Pulmonary embolism    • RLS (restless legs syndrome)    • Seizures    • Sleep apnea        Allergies   Allergen Reactions   • Glipizide Other (See Comments)     Slurred Speech   • Reglan [Metoclopramide] Anxiety   • Tramadol Other (See Comments)     seizures   • Risperdal [Risperidone] Other (See Comments)     Slurred speech       Past Surgical History:   Procedure Laterality Date   • ADENOIDECTOMY     • CARDIAC CATHETERIZATION N/A 3/15/2017    Procedure: Left Heart Cath;  Surgeon: Edwige Briceno MD;  Location: [facility] CATH INVASIVE LOCATION;  Service:    • CARDIAC  CATHETERIZATION N/A 3/15/2017    Procedure: Stent SOPHIA coronary;  Surgeon: Edwige Briceno MD;  Location: [facility] CATH INVASIVE LOCATION;  Service:    • CHOLECYSTECTOMY     • CORONARY ANGIOPLASTY WITH STENT PLACEMENT     • TX RT/LT HEART CATHETERS N/A 3/15/2017    Procedure: Percutaneous Coronary Intervention;  Surgeon: Edwige Briceno MD;  Location: [facility] CATH INVASIVE LOCATION;  Service: Cardiovascular   • TONSILLECTOMY     • TRANSESOPHAGEAL ECHOCARDIOGRAM (MONICA)         Family History   Problem Relation Age of Onset   • Heart disease Mother    • Hypertension Mother    • Hyperlipidemia Mother    • Coronary artery disease Mother    • Cancer Paternal Grandfather        Social History     Social History   • Marital status:      Spouse name: Cori   • Number of children: 0   • Years of education: N/A     Occupational History   • Disabled      Social History Main Topics   • Smoking status: Former Smoker     Quit date: 1997   • Smokeless tobacco: Current User     Types: Snuff   • Alcohol use No   • Drug use: No   • Sexual activity: Yes     Partners: Female     Birth control/ protection: None     Other Topics Concern   • Not on file     Social History Narrative           Objective   Physical Exam   Constitutional: He is oriented to person, place, and time. He appears well-developed and well-nourished.   HENT:   Head: Normocephalic and atraumatic.   Right Ear: External ear normal.   Left Ear: External ear normal.   Nose: Nose normal.   Mouth/Throat: Oropharynx is clear and moist.   Eyes: EOM are normal. Pupils are equal, round, and reactive to light.   Neck: Neck supple. No JVD present. No tracheal deviation present.   Cardiovascular: Normal rate, regular rhythm, normal heart sounds and intact distal pulses.  Exam reveals no gallop and no friction rub.    No murmur heard.  Pulmonary/Chest: Effort normal and breath sounds normal. He has no wheezes. He has no rales.   Abdominal: Soft. Bowel sounds are normal. There is no  tenderness. There is no rebound and no guarding.   Musculoskeletal: He exhibits no edema or tenderness.   Lymphadenopathy:     He has no cervical adenopathy.   Neurological: He is alert and oriented to person, place, and time. He has normal strength. No cranial nerve deficit or sensory deficit. Coordination normal. GCS eye subscore is 4. GCS verbal subscore is 5. GCS motor subscore is 6.   Skin: Skin is warm and dry.   Nursing note and vitals reviewed.      ECG 12 Lead    Date/Time: 2/9/2018 8:44 PM  Performed by: LORENE GRIFFIN  Authorized by: LORENE GRIFFIN   Interpreted by physician  Rhythm: sinus rhythm  Rate: normal  BPM: 73  QRS axis: normal  Conduction: conduction normal  ST Segments: ST segments normal  T Waves: T waves normal  Other: no other findings  Clinical impression: normal ECG               ED Course  ED Course     Labs Reviewed   COMPREHENSIVE METABOLIC PANEL - Abnormal; Notable for the following:        Result Value    Glucose 159 (*)     Creatinine 0.67 (*)     All other components within normal limits   CBC WITH AUTO DIFFERENTIAL - Abnormal; Notable for the following:     Hemoglobin 13.0 (*)     Hematocrit 38.3 (*)     Immature Grans % 1.9 (*)     Immature Grans, Absolute 0.14 (*)     All other components within normal limits   CK - Abnormal; Notable for the following:     Creatine Kinase 240 (*)     All other components within normal limits   TROPONIN (IN-HOUSE) - Normal   BNP (IN-HOUSE) - Normal   PROTIME-INR - Normal    Narrative:     Therapeutic range for most indications is 2.0-3.0 INR,  or 2.5-3.5 for mechanical heart valves.   APTT - Normal    Narrative:     The recommended Heparin therapeutic range is 68-97 seconds.   CK MB - Normal   D-DIMER, QUANTITATIVE - Normal    Narrative:     Dimer values <500 ng/ml FEU are FDA approved as aid in diagnosis of deep venous thrombosis and pulmonary embolism.  This test should not be used in an exclusion strategy with pretest probability alone.    A  recent guideline regarding diagnosis for pulmonary thomboembolism recommends an adjusted exclusion criterion of age x 10 ng/ml FEU for patients >50 years of age ([employer]ori Intern Med 2015; 163: 701-711).   RAINBOW DRAW    Narrative:     The following orders were created for panel order [place] Draw.  Procedure                               Abnormality         Status                     ---------                               -----------         ------                     Light Blue Top[[id number]]                                   Final result               Green Top (Gel)[[id number]]                                  Final result               Lavender Top[[id number]]                                     Final result               Gold Top - SST[[id number]]                                   Final result                 Please view results for these tests on the individual orders.   TROPONIN (IN-HOUSE)   TROPONIN (IN-HOUSE)   TROPONIN (IN-HOUSE)   CBC AND DIFFERENTIAL    Narrative:     The following orders were created for panel order CBC & Differential.  Procedure                               Abnormality         Status                     ---------                               -----------         ------                     CBC Auto Differential[[id number]]        Abnormal            Final result                 Please view results for these tests on the individual orders.   LIGHT BLUE TOP   GREEN TOP   LAVENDER TOP   GOLD TOP - SST     Xr Chest 2 View    Result Date: 2/9/2018  Narrative: EXAM:          Radiograph(s), Chest VIEWS:    PA / Lat ; 2     DATE/TIME:  2/9/2018 8:43 PM CST            INDICATION:   chest pain  COMPARISON:  CXR: 1/18/18         FINDINGS:         - lines/tubes:    none   - cardiac:         size within normal limits       - mediastinum: contour within normal limits       - lungs:         no focal air space process, pulmonary interstitial edema, nodule(s)/mass           - pleura:         no evidence of   fluid                - osseous:         unremarkable for age                - misc.:        Impression: CONCLUSION:    1. No evidence of an acute cardiopulmonary process.                                  Electronically signed by:  MIGUEL A Soni MD  2/9/2018 8:44 PM CST Workstation: [id number]    Ct Head Without Contrast    Result Date: 2/9/2018  Narrative: Exam: Head CT without contrast INDICATION: Headache TECHNIQUE: Routine head CT without contrast. Sagittal coronal reconstructions were obtained. CT technique performed using ALARA. COMPARISON: 9/12/2014 FINDINGS: The bony calvarium is intact. The imaged paranasal sinuses mastoid air cells are clear. No extra-axial fluid collection. The ventricular system is normal. Normal gray-white differentiation. No sign of acute infarction. No intraparenchymal hemorrhage, mass or midline shift.     Impression: No acute intracranial abnormality. Electronically signed by:  Brennan Corrigan MD  2/9/2018 10:03 PM CST Workstation: [id number]    Ct Abdomen Pelvis With Contrast    Result Date: 1/27/2018  Narrative: [facility] Patient Name: KEARA ESCOBAR ORDERING: JOHN EATON ATTENDING: JOHN EATON REFERRING: JOHN EATON ----------------------- PROCEDURE: CT abdomen and pelvis with intravenous contrast HISTORY: Abd pain, gastroenteritis or colitis suspected, K62.5 Hemorrhage of anus and rectum Dose length product: 21579.6 This exam was performed using radiation doses that are as low as reasonably achievable (ALARA). This exam was performed according to our departmental dose optimization program, which includes automated exposure control, adjustment of the mA and/or KV according to patient size and/or use of iterative reconstruction technique. COMPARISON: No comparison CONTRAST: Oral and 95 cc intravenous Isovue 300 TECHNIQUE: Multiple contiguous contrast enhanced axial images are obtained of the abdomen and pelvis. Delayed imaging of the  abdomen was also performed. FINDINGS: The right-sided the patient is obscured by streak artifact. LOWER CHEST: Unremarkable. HEPATOBILIARY: Enlarged liver measuring 26 cm in length. SPLEEN: Enlarged spleen measuring 17 cm in length. PANCREAS: Unremarkable ADRENAL GLANDS: Unremarkable. KIDNEYS/URETERS: No hydronephrosis. GASTROINTESTINAL: Unremarkable REPRODUCTIVE ORGANS: Unremarkable. URINARY BLADDER: Unremarkable VASCULAR: Unremarkable LYMPH NODES: No pathologically enlarged nodes by size criteria. PERITONEUM/RETROPERITONEUM: Unremarkable. OSSEOUS STRUCTURES: Degenerative disc disease at L5-S1.     Impression: CONCLUSION: Limited exam. Hepatosplenomegaly Electronically signed by:  Earnest Fish MD  1/27/2018 8:53 AM CST Workstation: [id number]    Xr Chest 1 View    Result Date: 1/18/2018  Narrative: Chest single view on  1/18/2018 CLINICAL INDICATION: Shortness of breath, chest pressure COMPARISON: 1/6/2018 FINDINGS: The lungs are clear. Cardiac, hilar and mediastinal contours are within normal limits. Pulmonary vascularity is within normal limits. No bony abnormality is noted.     Impression: No active disease. Electronically signed by:  Pelon Anderson  1/18/2018 12:24 AM CST Workstation: [id number]                 HEART Score (for prediction of 6-week risk of major adverse cardiac event) reviewed and/or performed as part of the patient evaluation and treatment planning process.  The result associated with this review/performance is: 3       MDM    Final diagnoses:   Chest pain, unspecified type   Acute nonintractable headache, unspecified headache type            Seth Mcgarry MD  02/09/18 2153       Seth Mcgarry MD  02/09/18 2212       Seth Mcgarry MD  02/09/18 2218

## 2018-02-10 NOTE — CONSULTS
Referring Provider: [facility]  Reason for Consultation: chest pain    Patient Care Team:  LALA Barajas as PCP - General (Nurse Practitioner)    Chief complaint  Chest pain    Subjective .   Patient is a 37 y.o. male with multiple medical problems notably coronary artery disease status post PCI, morbid obesity, obstructive sleep apnea, hypertension, dyslipidemia, chronic pain syndrome, GERD, diabetes mellitus, previous pulmonary embolism, prothrombin mutation who presents with onset of right-sided pleuritic chest pain and intermittent headaches associated with nausea and shortness of air.  He denies fever, chills, vomiting, cough, palpitations, syncope, presyncope, blurry vision or any lateralizing signs.    He had CT scan of the head in the emergency room which was unremarkable.  Troponin, EKG and chest x-ray were unremarkable.      Patient is known to our service for repeated admissions for chest pain.  He was discharged from the hospital about 10 days ago following an admission for GI bleed.  His last cardiac catheterization was in March 2017 that showed significant in-stent restenosis of the LAD for which he had a PCI.     Family and social history: Patient is  and lives with his wife.  He denies history of alcohol or tobacco use.  There is family history of hypertension and heart disease.     History of present illness:    C/o stabbing and pressure like chest pain. Complaint with meds. Has poorly controlled DM and morbid obesity  Review of Systems   Pertinent items are noted in HPI, all other systems reviewed and negative    History  Past Medical History:   Diagnosis Date   • Chest pain    • Chronic back pain    • Coronary artery disease    • Diabetes mellitus    • GERD (gastroesophageal reflux disease)    • Hyperlipidemia    • Hypertension    • Morbid obesity    • Pulmonary embolism    • RLS (restless legs syndrome)    • Seizures    • Sleep apnea        Objective     Vital Sign Min/Max for  "last 24 hours  Temp  Min: 96.8 °F (36 °C)  Max: 97.7 °F (36.5 °C)   BP  Min: 136/63  Max: 211/106   Pulse  Min: 63  Max: 79   Resp  Min: 18  Max: 24   SpO2  Min: 96 %  Max: 98 %   No Data Recorded   Weight  Min: 215 kg (474 lb)  Max: 216 kg (475 lb 12.8 oz)     Flowsheet Rows         First Filed Value    Admission Height  180.3 cm (71\") Documented at 02/09/2018 1931    Admission Weight  (!)  215 kg (474 lb) Documented at 02/09/2018 1931             Ejection Fraction  No results found for: EF    Echo EF Estimated  Lab Results   Component Value Date    ECHOEFEST 60 12/13/2017       Nuclear Stress Ejection Fraction  No components found for: NUCEF    Cath Ejection Fraction Quantitative  No results found for: CATHEF: 03/2017 .   Findings:     LM: The left main originates from the left coronary sinus.  It is a decent caliber vessel.  It divides into LAD and left circumflex.  The left main is free of obstructive disease  LAD: The left anterior descending coronary artery is a medium caliber vessel.  It courses along the anterior interventricular groove.  The previously placed stent in the mid LAD was noted.  There is approximately 80% in-stent restenosis.  There is LONNIE 2 flow within the LAD.  The LAD gives rise to a diagonal branch which is free of disease.  LCX: The left circumflex is a large-caliber vessel.  It appears to be codominant.  It courses along the posterior AV groove.  The left circumflex gives rise to a decent caliber obtuse marginal branches.  The second marginal branch terminates as a posterolateral branch.   The left circumflex also gives rise to a PDA which is medium in caliber.  The circumflex system has nonobstructive disease.  RCA: The right coronary artery is a decent caliber vessel.  It originates from the right coronary sinus.  Gives rise to relatively small caliber PDA.  The RCA is free of disease.  PDA: The PDA is free of disease.        Impression:  Significant in-stent restenosis left anterior " descending coronary artery.     Plan: We will proceed with PCI to the mid LAD stent in-stent restenosis.     PCI to the LAD:   Anticoagulation was achieved with administration of Angiomax bolus followed by drip.  XB LAD 3.5 guiding catheter was advanced over the wire.  The ostium of the left main was selectively engaged.  We were able to cross the lesion using a run through guidewire.  We advanced a 2.5 x 18 mm xience alpine stent.  The stent was positioned within the previously placed stent.  The stent was deployed at the nominal pressure of 10 lucy.  We subsequently advanced a 3.0 x 15 mm noncompliant balloon.  The balloon was inflated at a high pressure of 16 lucy.  The 80% in-stent restenosis was reduced to 0%.  The LONNIE flow improved to LONNIE 3 flow.  Physical Exam:     General Appearance:    Alert, cooperative, in no acute distress   Head:    Normocephalic, without obvious abnormality, atraumatic   Eyes:            Lids and lashes normal, conjunctivae and sclerae normal, no   icterus, no pallor, corneas clear, PERRLA   Ears:    Ears appear intact with no abnormalities noted   Throat:   No oral lesions, no thrush, oral mucosa moist   Neck:   No adenopathy, supple, trachea midline, no thyromegaly, no   carotid bruit, no JVD   Back:     No kyphosis present, no scoliosis present, no skin lesions,      erythema or scars, no tenderness to percussion or                   palpation,   range of motion normal   Lungs:     Clear to auscultation,respirations regular, even and                  unlabored    Heart:    Regular rhythm and normal rate, normal S1 and S2, no            murmur, no gallop, no rub, no click   Chest Wall:    No abnormalities observed   Abdomen:     Normal bowel sounds, no masses, no organomegaly, soft        non-tender, non-distended, no guarding, no rebound                tenderness   Rectal:     Deferred   Extremities:   Moves all extremities well, no edema, no cyanosis, no             redness    Pulses:   Pulses palpable and equal bilaterally   Skin:   No bleeding, bruising or rash   Lymph nodes:   No palpable adenopathy   Neurologic:   Cranial nerves 2 - 12 grossly intact, sensation intact, DTR       present and equal bilaterally       Results Review:   I reviewed the patient's new clinical results.  I reviewed the patient's new imaging results and agree with the interpretation.      Assessment/Plan     Active Problems:    Chest pain      Needs OP lexiscan nuclear stress test and fu DR Anderson in 1 week.  Needs better DM control.     I discussed the patients findings and my recommendations with patient    Mariah Lakhani MD  02/10/18  9:09 AM    Time: More than 50% of time spent in counseling and coordination of care:  Total face-to-face/floor time 20 min.  Time spent in counseling 20 min. Counseling included the following topics: cad

## 2018-02-11 LAB — BACTERIA SPEC AEROBE CULT: NORMAL

## 2018-02-12 ENCOUNTER — TELEPHONE (OUTPATIENT)
Dept: FAMILY MEDICINE CLINIC | Facility: CLINIC | Age: 38
End: 2018-02-12

## 2018-02-12 DIAGNOSIS — E11.65 TYPE 2 DIABETES MELLITUS WITH HYPERGLYCEMIA, WITHOUT LONG-TERM CURRENT USE OF INSULIN (HCC): ICD-10-CM

## 2018-02-12 NOTE — TELEPHONE ENCOUNTER
----- Message from Adriana Perez LPN sent at 2/12/2018 10:06 AM CST -----  Regarding: FW: 2-3 day post hospital follow up with Froylan Keyes  Contact: [phone]      ----- Message -----     From: Michelle Shields RN     Sent: 2/10/2018  12:10 PM       To: Eduin Lomeli [facility]  Subject: 2-3 day post hospital follow up with Froylan Tan    Please call pt with appointment date and time for 2-3 days post hospital appointment with Froylan Keyes

## 2018-02-13 ENCOUNTER — OFFICE VISIT (OUTPATIENT)
Dept: FAMILY MEDICINE CLINIC | Facility: CLINIC | Age: 38
End: 2018-02-13

## 2018-02-13 VITALS
SYSTOLIC BLOOD PRESSURE: 136 MMHG | DIASTOLIC BLOOD PRESSURE: 82 MMHG | WEIGHT: 315 LBS | TEMPERATURE: 96.9 F | HEART RATE: 71 BPM | BODY MASS INDEX: 44.1 KG/M2 | RESPIRATION RATE: 24 BRPM | OXYGEN SATURATION: 98 % | HEIGHT: 71 IN

## 2018-02-13 DIAGNOSIS — E11.65 TYPE 2 DIABETES MELLITUS WITH HYPERGLYCEMIA, WITHOUT LONG-TERM CURRENT USE OF INSULIN (HCC): ICD-10-CM

## 2018-02-13 DIAGNOSIS — H93.8X3 EAR CONGESTION, BILATERAL: ICD-10-CM

## 2018-02-13 DIAGNOSIS — I27.82 OTHER CHRONIC PULMONARY EMBOLISM WITHOUT ACUTE COR PULMONALE (HCC): Chronic | ICD-10-CM

## 2018-02-13 DIAGNOSIS — R42 DIZZINESS: Primary | ICD-10-CM

## 2018-02-13 DIAGNOSIS — D68.2 FACTOR II DEFICIENCY (HCC): ICD-10-CM

## 2018-02-13 DIAGNOSIS — I10 ESSENTIAL HYPERTENSION: ICD-10-CM

## 2018-02-13 LAB — GLUCOSE BLDC GLUCOMTR-MCNC: 193 MG/DL (ref 70–130)

## 2018-02-13 PROCEDURE — 99214 OFFICE O/P EST MOD 30 MIN: CPT | Performed by: NURSE PRACTITIONER

## 2018-02-13 PROCEDURE — 82962 GLUCOSE BLOOD TEST: CPT | Performed by: NURSE PRACTITIONER

## 2018-02-13 RX ORDER — MECLIZINE HYDROCHLORIDE 25 MG/1
25 TABLET ORAL 3 TIMES DAILY PRN
Qty: 30 TABLET | Refills: 0 | Status: SHIPPED | OUTPATIENT
Start: 2018-02-13 | End: 2018-03-19

## 2018-02-13 NOTE — PROGRESS NOTES
"Chief Complaint   Patient presents with   • Dizziness   • Headache       Subjective   Yordy Hansen is a 37 y.o. male presents to the office for evaluation of dizziness and headache pain X 1 day.    [facility]  Cardiology:CAD   Takes daily plavix   He is followed by Dr. Jenkins and Yolanda Bautista, [facility]- cardiology. Previously  followed by Dr. Boone. He had a cardiac stent placed in July 2016 and another  placed in 03/2017.     Recent PE 12/2017- was previously on xarelto, insurance would not cover and was  switched to eliquis. Followed by coumadin clinic. Now back on lifelong xarelto- if  not covered, will be placed on coumadin    T2DM- managed with metformin and tradjenta. 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     He presents with a 1 day history of frontal headache pain and dizziness. Both headache pain and dizziness began today after he was up and moving around per his normal daily schedule. Headache pain is located at frontal lobe; he describes his headache pain as a \"throb\" and describes his dizziness as \"like someone is stirring inside my head\".  He denies syncope and confusion. He did \"stumble\" earlier, but denies fall. He has not started any new medications. His FBG and pre-meal BG has been high 100s and 200s. He has not monitored his BP or HR. He denies SOA, diaphoresis, chest pain, and palpitations, with dizziness and headache pain.     He was recently evaluated and treated at  [facility].  Medical record reviewed: CTof head was completed; negative for intracranial abnormality.      Dizziness   This is a new problem. The current episode started today. Associated symptoms " include headaches and vertigo. Pertinent negatives include no abdominal pain, arthralgias, chest pain, chills, congestion, coughing, diaphoresis, fatigue, fever, myalgias, nausea, numbness, rash, sore throat, vomiting or weakness. He has tried rest and position changes for the symptoms. The treatment provided no relief.   Headache    This is a new problem. The current episode started today. The pain is located in the frontal region. The pain does not radiate. The quality of the pain is described as throbbing. The pain is at a severity of 8/10. The pain is moderate. Associated symptoms include dizziness, a loss of balance and sinus pressure. Pertinent negatives include no abdominal pain, blurred vision, coughing, ear pain, eye pain, eye redness, eye watering, fever, nausea, numbness, phonophobia, photophobia, rhinorrhea, sore throat, vomiting or weakness. The symptoms are aggravated by activity. He has tried nothing for the symptoms. The treatment provided no relief. His past medical history is significant for hypertension and obesity.     Family History   Problem Relation Age of Onset   • Heart disease Mother    • Hypertension Mother    • Hyperlipidemia Mother    • Coronary artery disease Mother    • Cancer Paternal Grandfather      Social History     Social History   • Marital status:      Spouse name: Cori   • Number of children: 0   • Years of education: N/A     Occupational History   • Disabled      Social History Main Topics   • Smoking status: Former Smoker     Quit date: 1997   • Smokeless tobacco: Current User     Types: Snuff   • Alcohol use No   • Drug use: No   • Sexual activity: Yes     Partners: Female     Birth control/ protection: None     Other Topics Concern   • Not on file     Social History Narrative       The following portions of the patient's history were reviewed and updated as appropriate: allergies, current medications, past family history, past medical history, past social history,  "past surgical history and problem list.    Review of Systems   Constitutional: Negative.  Negative for activity change, appetite change, chills, diaphoresis, fatigue, fever and unexpected weight change.   HENT: Positive for sinus pressure. Negative for congestion, drooling, ear pain, facial swelling, postnasal drip, rhinorrhea, sore throat, trouble swallowing and voice change.    Eyes: Negative.  Negative for blurred vision, photophobia, pain, discharge, redness and visual disturbance.   Respiratory: Negative.  Negative for apnea, cough, choking and wheezing.    Cardiovascular: Negative.  Negative for chest pain, palpitations and leg swelling.   Gastrointestinal: Negative.  Negative for abdominal distention, abdominal pain, constipation, diarrhea, nausea and vomiting.   Endocrine: Negative.  Negative for polyphagia and polyuria.   Genitourinary: Negative.  Negative for decreased urine volume, difficulty urinating and dysuria.   Musculoskeletal: Negative.  Negative for arthralgias, gait problem and myalgias.   Skin: Negative.  Negative for rash and wound.   Allergic/Immunologic: Negative.    Neurological: Positive for dizziness, vertigo, headaches and loss of balance. Negative for syncope, weakness, light-headedness and numbness.   Hematological: Negative.    Psychiatric/Behavioral: Negative.  Negative for confusion, decreased concentration and sleep disturbance. The patient is not nervous/anxious.      14 Point ROS completed with pertinent positives discussed. All other systems reviewed and are negative.       Objective   Encounter Vitals  /82  Pulse 71  Temp 96.9 °F (36.1 °C) (Tympanic)   Resp 24  Ht 180.3 cm (71\")  Wt (!) 215 kg (475 lb)  SpO2 98%  BMI 66.25 kg/m2  Vitals:    02/13/18 1358   BP: 136/82   Pulse: 71   Resp: 24   Temp: 96.9 °F (36.1 °C)   TempSrc: Tympanic   SpO2: 98%   Weight: (!) 215 kg (475 lb)   Height: 180.3 cm (71\")       Physical Exam   Constitutional: He is oriented to person, " place, and time. Vital signs are normal. He appears well-developed and well-nourished.   HENT:   Head: Normocephalic and atraumatic.   Right Ear: Tympanic membrane, external ear and ear canal normal.   Left Ear: Tympanic membrane, external ear and ear canal normal.   Nose: Nose normal. Right sinus exhibits no maxillary sinus tenderness and no frontal sinus tenderness. Left sinus exhibits no maxillary sinus tenderness and no frontal sinus tenderness.   Mouth/Throat: Uvula is midline, oropharynx is clear and moist and mucous membranes are normal. No posterior oropharyngeal edema or posterior oropharyngeal erythema.   Bilateral ear congestion- no cone of light. No s/sx of infective process   Eyes: Lids are normal. Pupils are equal, round, and reactive to light. Right eye exhibits no nystagmus. Left eye exhibits no nystagmus.   Neck: Trachea normal and normal range of motion. Neck supple. No thyroid mass present.   Cardiovascular: Normal rate, regular rhythm, S1 normal, S2 normal, normal heart sounds and intact distal pulses.  Exam reveals no gallop and no friction rub.    No murmur heard.  Pulmonary/Chest: Effort normal and breath sounds normal. No respiratory distress. He has no wheezes. He has no rales.   Abdominal: Soft. Normal appearance and bowel sounds are normal. He exhibits no mass. There is no rebound and no guarding.   Musculoskeletal: Normal range of motion.   Lymphadenopathy:     He has no cervical adenopathy.   Neurological: He is alert and oriented to person, place, and time. He has normal strength. No cranial nerve deficit or sensory deficit. Gait normal.   Skin: Skin is warm and dry. No rash noted.   Psychiatric: He has a normal mood and affect. His speech is normal and behavior is normal. Judgment and thought content normal. Cognition and memory are normal.   Nursing note and vitals reviewed.      Pertinent Labs        Ref Range & Units 2:15 PM  (2/13/18) 3d ago  (2/10/18) 3d ago  (2/10/18)    Glucose  70 - 130 mg/dL 193 (A) 223 (H) (H)CM       Key Imaging/Tracings/POC Testing  FB  Assessment and Medications  Problems Addressed this Visit        Cardiovascular and Mediastinum    Chronic pulmonary embolism (Chronic)    Essential hypertension       Endocrine    Type 2 diabetes mellitus with hyperglycemia, without long-term current use of insulin       Hematopoietic and Hemostatic    Factor II deficiency      Other Visit Diagnoses     Dizziness    -  Primary    Relevant Medications    meclizine (ANTIVERT) 25 MG tablet    Other Relevant Orders    POCT Glucose (Completed)    Ear congestion, bilateral            Side effects of ordered medications discussed with patient.     Plan/Additional Notes/Instructions  Plan   1. Meclizine ordered to take as directed, short term, for dizziness  2. Suggested to use otc flonase to treat bilateral ear congestion- instructed on proper use  3. T2DM- improving- continue current therapy. Will reassess at previously scheduled date  4. Given significant clotting history, strongly advised patient to go to ER for any worsening dizziness, headache pain or any other symptoms- ruby to include syncope, confusion, loss of vision, impaired mobility, chest pain, difficulty breathing, SOA, palpitations, or any feelings of impending doom.   5. RTC if no better after completion of above ordered POC, or if symptoms worsen.    Follow-Up  Return if symptoms worsen or fail to improve, for Next scheduled follow up.    Patient/caregiver verbalizes understanding of all orders and instructions in this plan of care.           This document has been electronically signed by LALA Barajas on 2018 5:12 PM

## 2018-02-14 ENCOUNTER — APPOINTMENT (OUTPATIENT)
Dept: CT IMAGING | Facility: HOSPITAL | Age: 38
End: 2018-02-14

## 2018-02-14 ENCOUNTER — HOSPITAL ENCOUNTER (OUTPATIENT)
Facility: HOSPITAL | Age: 38
Setting detail: OBSERVATION
Discharge: HOME | End: 2018-02-16
Attending: EMERGENCY MEDICINE | Admitting: FAMILY MEDICINE

## 2018-02-14 ENCOUNTER — TELEPHONE (OUTPATIENT)
Dept: FAMILY MEDICINE CLINIC | Facility: CLINIC | Age: 38
End: 2018-02-14

## 2018-02-14 DIAGNOSIS — R16.0 ENLARGED LIVER: Primary | ICD-10-CM

## 2018-02-14 DIAGNOSIS — R42 DIZZY SPELLS: Primary | ICD-10-CM

## 2018-02-14 DIAGNOSIS — I16.0 HYPERTENSIVE URGENCY: ICD-10-CM

## 2018-02-14 DIAGNOSIS — R07.9 CHEST PAIN, UNSPECIFIED TYPE: Primary | ICD-10-CM

## 2018-02-14 LAB
ALBUMIN SERPL-MCNC: 4.1 G/DL (ref 3.4–4.8)
ALBUMIN/GLOB SERPL: 1.1 G/DL (ref 1.1–1.8)
ALP SERPL-CCNC: 79 U/L (ref 38–126)
ALT SERPL W P-5'-P-CCNC: 30 U/L (ref 21–72)
ANION GAP SERPL CALCULATED.3IONS-SCNC: 13 MMOL/L (ref 5–15)
APTT PPP: 34.3 SECONDS (ref 20–40.3)
ARTERIAL PATENCY WRIST A: ABNORMAL
AST SERPL-CCNC: 24 U/L (ref 17–59)
ATMOSPHERIC PRESS: ABNORMAL MMHG
BASE EXCESS BLDA CALC-SCNC: 0.7 MMOL/L (ref -2.4–2.4)
BASOPHILS # BLD AUTO: 0.01 10*3/MM3 (ref 0–0.2)
BASOPHILS NFR BLD AUTO: 0.1 % (ref 0–2)
BDY SITE: ABNORMAL
BILIRUB SERPL-MCNC: 0.3 MG/DL (ref 0.2–1.3)
BUN BLD-MCNC: 13 MG/DL (ref 7–21)
BUN/CREAT SERPL: 16.5 (ref 7–25)
CA-I BLD-MCNC: 4.8 MG/DL (ref 4.5–4.9)
CALCIUM SPEC-SCNC: 9.2 MG/DL (ref 8.4–10.2)
CHLORIDE SERPL-SCNC: 95 MMOL/L (ref 95–110)
CO2 BLDA-SCNC: 27 MMOL/L (ref 23–27)
CO2 SERPL-SCNC: 29 MMOL/L (ref 22–31)
CREAT BLD-MCNC: 0.79 MG/DL (ref 0.7–1.3)
DEPRECATED RDW RBC AUTO: 43.7 FL (ref 35.1–43.9)
EOSINOPHIL # BLD AUTO: 0.29 10*3/MM3 (ref 0–0.7)
EOSINOPHIL NFR BLD AUTO: 3.9 % (ref 0–7)
ERYTHROCYTE [DISTWIDTH] IN BLOOD BY AUTOMATED COUNT: 14.4 % (ref 11.5–14.5)
GFR SERPL CREATININE-BSD FRML MDRD: 109 ML/MIN/1.73 (ref 60–162)
GLOBULIN UR ELPH-MCNC: 3.7 GM/DL (ref 2.3–3.5)
GLUCOSE BLD-MCNC: 294 MG/DL (ref 60–100)
GLUCOSE BLDA-MCNC: 295 MMOL/L
HCO3 BLDA-SCNC: 25.7 MMOL/L (ref 22–26)
HCT VFR BLD AUTO: 39.8 % (ref 39–49)
HCT VFR BLD CALC: 40 % (ref 40–54)
HGB BLD-MCNC: 13.6 G/DL (ref 13.7–17.3)
HGB BLDA-MCNC: 13.7 G/DL (ref 14–18)
HOLD SPECIMEN: NORMAL
IMM GRANULOCYTES # BLD: 0.07 10*3/MM3 (ref 0–0.02)
IMM GRANULOCYTES NFR BLD: 0.9 % (ref 0–0.5)
INR PPP: 0.95 (ref 0.8–1.2)
LYMPHOCYTES # BLD AUTO: 1.43 10*3/MM3 (ref 0.6–4.2)
LYMPHOCYTES NFR BLD AUTO: 19.4 % (ref 10–50)
MAGNESIUM SERPL-MCNC: 1.7 MG/DL (ref 1.6–2.3)
MCH RBC QN AUTO: 28.3 PG (ref 26.5–34)
MCHC RBC AUTO-ENTMCNC: 34.2 G/DL (ref 31.5–36.3)
MCV RBC AUTO: 82.9 FL (ref 80–98)
MODALITY: ABNORMAL
MONOCYTES # BLD AUTO: 0.61 10*3/MM3 (ref 0–0.9)
MONOCYTES NFR BLD AUTO: 8.3 % (ref 0–12)
NEUTROPHILS # BLD AUTO: 4.97 10*3/MM3 (ref 2–8.6)
NEUTROPHILS NFR BLD AUTO: 67.4 % (ref 37–80)
NT-PROBNP SERPL-MCNC: 30.2 PG/ML (ref 0–450)
PCO2 BLDA: 42.7 MM HG (ref 35–45)
PH BLDA: 7.4 PH UNITS (ref 7.35–7.45)
PLATELET # BLD AUTO: 187 10*3/MM3 (ref 150–450)
PMV BLD AUTO: 8.5 FL (ref 8–12)
PO2 BLDA: 76.6 MM HG (ref 80–105)
POTASSIUM BLD-SCNC: 4.2 MMOL/L (ref 3.5–5.1)
POTASSIUM BLDA-SCNC: 4.11 MMOL/L (ref 3.6–4.9)
PROT SERPL-MCNC: 7.8 G/DL (ref 6.3–8.6)
PROTHROMBIN TIME: 12.6 SECONDS (ref 11.1–15.3)
RBC # BLD AUTO: 4.8 10*6/MM3 (ref 4.37–5.74)
SAO2 % BLDCOA: 94.3 %
SODIUM BLD-SCNC: 137 MMOL/L (ref 137–145)
SODIUM BLDA-SCNC: 133.2 MMOL/L (ref 138–146)
TROPONIN I SERPL-MCNC: <0.012 NG/ML
WBC NRBC COR # BLD: 7.38 10*3/MM3 (ref 3.2–9.8)

## 2018-02-14 PROCEDURE — 83735 ASSAY OF MAGNESIUM: CPT | Performed by: EMERGENCY MEDICINE

## 2018-02-14 PROCEDURE — G0378 HOSPITAL OBSERVATION PER HR: HCPCS

## 2018-02-14 PROCEDURE — 0 IOPAMIDOL PER 1 ML: Performed by: EMERGENCY MEDICINE

## 2018-02-14 PROCEDURE — 82550 ASSAY OF CK (CPK): CPT | Performed by: FAMILY MEDICINE

## 2018-02-14 PROCEDURE — 83880 ASSAY OF NATRIURETIC PEPTIDE: CPT | Performed by: EMERGENCY MEDICINE

## 2018-02-14 PROCEDURE — 25010000002 HYDRALAZINE PER 20 MG: Performed by: EMERGENCY MEDICINE

## 2018-02-14 PROCEDURE — 93005 ELECTROCARDIOGRAM TRACING: CPT | Performed by: EMERGENCY MEDICINE

## 2018-02-14 PROCEDURE — 71275 CT ANGIOGRAPHY CHEST: CPT

## 2018-02-14 PROCEDURE — 96374 THER/PROPH/DIAG INJ IV PUSH: CPT

## 2018-02-14 PROCEDURE — 85730 THROMBOPLASTIN TIME PARTIAL: CPT | Performed by: EMERGENCY MEDICINE

## 2018-02-14 PROCEDURE — 96376 TX/PRO/DX INJ SAME DRUG ADON: CPT

## 2018-02-14 PROCEDURE — 96375 TX/PRO/DX INJ NEW DRUG ADDON: CPT

## 2018-02-14 PROCEDURE — 70450 CT HEAD/BRAIN W/O DYE: CPT

## 2018-02-14 PROCEDURE — 25010000002 MORPHINE PER 10 MG: Performed by: EMERGENCY MEDICINE

## 2018-02-14 PROCEDURE — 93010 ELECTROCARDIOGRAM REPORT: CPT | Performed by: INTERNAL MEDICINE

## 2018-02-14 PROCEDURE — 85610 PROTHROMBIN TIME: CPT | Performed by: EMERGENCY MEDICINE

## 2018-02-14 PROCEDURE — 25010000002 MORPHINE PER 10 MG

## 2018-02-14 PROCEDURE — 84484 ASSAY OF TROPONIN QUANT: CPT | Performed by: EMERGENCY MEDICINE

## 2018-02-14 PROCEDURE — 82553 CREATINE MB FRACTION: CPT | Performed by: FAMILY MEDICINE

## 2018-02-14 PROCEDURE — 99284 EMERGENCY DEPT VISIT MOD MDM: CPT

## 2018-02-14 PROCEDURE — 82803 BLOOD GASES ANY COMBINATION: CPT | Performed by: EMERGENCY MEDICINE

## 2018-02-14 PROCEDURE — 80053 COMPREHEN METABOLIC PANEL: CPT | Performed by: EMERGENCY MEDICINE

## 2018-02-14 PROCEDURE — 85025 COMPLETE CBC W/AUTO DIFF WBC: CPT | Performed by: EMERGENCY MEDICINE

## 2018-02-14 RX ORDER — SODIUM CHLORIDE 0.9 % (FLUSH) 0.9 %
1-10 SYRINGE (ML) INJECTION AS NEEDED
Status: DISCONTINUED | OUTPATIENT
Start: 2018-02-14 | End: 2018-02-16 | Stop reason: HOSPADM

## 2018-02-14 RX ORDER — ASPIRIN 81 MG/1
81 TABLET ORAL DAILY
Status: DISCONTINUED | OUTPATIENT
Start: 2018-02-15 | End: 2018-02-16 | Stop reason: HOSPADM

## 2018-02-14 RX ORDER — TRAZODONE HYDROCHLORIDE 150 MG/1
300 TABLET ORAL NIGHTLY
Status: DISCONTINUED | OUTPATIENT
Start: 2018-02-15 | End: 2018-02-16 | Stop reason: HOSPADM

## 2018-02-14 RX ORDER — HYDRALAZINE HYDROCHLORIDE 20 MG/ML
20 INJECTION INTRAMUSCULAR; INTRAVENOUS ONCE
Status: COMPLETED | OUTPATIENT
Start: 2018-02-14 | End: 2018-02-14

## 2018-02-14 RX ORDER — ATORVASTATIN CALCIUM 10 MG/1
10 TABLET, FILM COATED ORAL DAILY
Status: DISCONTINUED | OUTPATIENT
Start: 2018-02-15 | End: 2018-02-16 | Stop reason: HOSPADM

## 2018-02-14 RX ORDER — NICOTINE POLACRILEX 4 MG
15 LOZENGE BUCCAL
Status: DISCONTINUED | OUTPATIENT
Start: 2018-02-14 | End: 2018-02-16 | Stop reason: HOSPADM

## 2018-02-14 RX ORDER — NALOXONE HCL 0.4 MG/ML
0.4 VIAL (ML) INJECTION
Status: DISCONTINUED | OUTPATIENT
Start: 2018-02-14 | End: 2018-02-16 | Stop reason: HOSPADM

## 2018-02-14 RX ORDER — PANTOPRAZOLE SODIUM 40 MG/1
40 TABLET, DELAYED RELEASE ORAL NIGHTLY
Status: DISCONTINUED | OUTPATIENT
Start: 2018-02-15 | End: 2018-02-16 | Stop reason: HOSPADM

## 2018-02-14 RX ORDER — LABETALOL HYDROCHLORIDE 5 MG/ML
20 INJECTION, SOLUTION INTRAVENOUS ONCE
Status: COMPLETED | OUTPATIENT
Start: 2018-02-14 | End: 2018-02-14

## 2018-02-14 RX ORDER — ALBUTEROL SULFATE 2.5 MG/3ML
1.25 SOLUTION RESPIRATORY (INHALATION) EVERY 6 HOURS PRN
Status: DISCONTINUED | OUTPATIENT
Start: 2018-02-14 | End: 2018-02-16 | Stop reason: HOSPADM

## 2018-02-14 RX ORDER — ACETAMINOPHEN 325 MG/1
650 TABLET ORAL ONCE
Status: COMPLETED | OUTPATIENT
Start: 2018-02-14 | End: 2018-02-14

## 2018-02-14 RX ORDER — LISINOPRIL 40 MG/1
40 TABLET ORAL EVERY MORNING
Status: DISCONTINUED | OUTPATIENT
Start: 2018-02-15 | End: 2018-02-16 | Stop reason: HOSPADM

## 2018-02-14 RX ORDER — HYDRALAZINE HYDROCHLORIDE 20 MG/ML
10 INJECTION INTRAMUSCULAR; INTRAVENOUS EVERY 8 HOURS PRN
Status: DISCONTINUED | OUTPATIENT
Start: 2018-02-14 | End: 2018-02-16 | Stop reason: HOSPADM

## 2018-02-14 RX ORDER — CLOPIDOGREL BISULFATE 75 MG/1
75 TABLET ORAL DAILY
Status: DISCONTINUED | OUTPATIENT
Start: 2018-02-15 | End: 2018-02-16 | Stop reason: HOSPADM

## 2018-02-14 RX ORDER — SODIUM CHLORIDE 0.9 % (FLUSH) 0.9 %
10 SYRINGE (ML) INJECTION AS NEEDED
Status: DISCONTINUED | OUTPATIENT
Start: 2018-02-14 | End: 2018-02-16 | Stop reason: HOSPADM

## 2018-02-14 RX ORDER — RANOLAZINE 500 MG/1
1000 TABLET, EXTENDED RELEASE ORAL EVERY 12 HOURS SCHEDULED
Status: DISCONTINUED | OUTPATIENT
Start: 2018-02-15 | End: 2018-02-16 | Stop reason: HOSPADM

## 2018-02-14 RX ORDER — NITROGLYCERIN 0.4 MG/1
0.4 TABLET SUBLINGUAL
Status: DISCONTINUED | OUTPATIENT
Start: 2018-02-14 | End: 2018-02-16 | Stop reason: HOSPADM

## 2018-02-14 RX ORDER — PRAMIPEXOLE DIHYDROCHLORIDE 1 MG/1
2 TABLET ORAL NIGHTLY
Status: DISCONTINUED | OUTPATIENT
Start: 2018-02-15 | End: 2018-02-16 | Stop reason: HOSPADM

## 2018-02-14 RX ORDER — MORPHINE SULFATE 2 MG/ML
INJECTION, SOLUTION INTRAMUSCULAR; INTRAVENOUS
Status: COMPLETED
Start: 2018-02-14 | End: 2018-02-14

## 2018-02-14 RX ORDER — HYDROCODONE BITARTRATE AND ACETAMINOPHEN 10; 325 MG/1; MG/1
1 TABLET ORAL EVERY 6 HOURS PRN
Status: DISCONTINUED | OUTPATIENT
Start: 2018-02-14 | End: 2018-02-16 | Stop reason: HOSPADM

## 2018-02-14 RX ORDER — DEXTROSE MONOHYDRATE 25 G/50ML
25 INJECTION, SOLUTION INTRAVENOUS
Status: DISCONTINUED | OUTPATIENT
Start: 2018-02-14 | End: 2018-02-16 | Stop reason: HOSPADM

## 2018-02-14 RX ORDER — SERTRALINE HYDROCHLORIDE 25 MG/1
25 TABLET, FILM COATED ORAL DAILY
Status: DISCONTINUED | OUTPATIENT
Start: 2018-02-15 | End: 2018-02-16 | Stop reason: HOSPADM

## 2018-02-14 RX ORDER — MORPHINE SULFATE 2 MG/ML
1 INJECTION, SOLUTION INTRAMUSCULAR; INTRAVENOUS EVERY 4 HOURS PRN
Status: DISCONTINUED | OUTPATIENT
Start: 2018-02-14 | End: 2018-02-16 | Stop reason: HOSPADM

## 2018-02-14 RX ORDER — METOPROLOL SUCCINATE 100 MG/1
200 TABLET, EXTENDED RELEASE ORAL DAILY
Status: DISCONTINUED | OUTPATIENT
Start: 2018-02-15 | End: 2018-02-16 | Stop reason: HOSPADM

## 2018-02-14 RX ORDER — ONDANSETRON 4 MG/1
4 TABLET, ORALLY DISINTEGRATING ORAL EVERY 6 HOURS PRN
Status: DISCONTINUED | OUTPATIENT
Start: 2018-02-14 | End: 2018-02-16 | Stop reason: HOSPADM

## 2018-02-14 RX ORDER — ALBUTEROL SULFATE 90 UG/1
2 AEROSOL, METERED RESPIRATORY (INHALATION) EVERY 4 HOURS PRN
Status: DISCONTINUED | OUTPATIENT
Start: 2018-02-14 | End: 2018-02-15 | Stop reason: CLARIF

## 2018-02-14 RX ORDER — ISOSORBIDE MONONITRATE 60 MG/1
120 TABLET, EXTENDED RELEASE ORAL
Status: DISCONTINUED | OUTPATIENT
Start: 2018-02-15 | End: 2018-02-16 | Stop reason: HOSPADM

## 2018-02-14 RX ORDER — AMLODIPINE BESYLATE 10 MG/1
10 TABLET ORAL NIGHTLY
Status: DISCONTINUED | OUTPATIENT
Start: 2018-02-15 | End: 2018-02-16 | Stop reason: HOSPADM

## 2018-02-14 RX ORDER — MECLIZINE HYDROCHLORIDE 25 MG/1
25 TABLET ORAL 3 TIMES DAILY PRN
Status: DISCONTINUED | OUTPATIENT
Start: 2018-02-14 | End: 2018-02-16 | Stop reason: HOSPADM

## 2018-02-14 RX ADMIN — MORPHINE SULFATE 4 MG: 4 INJECTION, SOLUTION INTRAMUSCULAR; INTRAVENOUS at 23:06

## 2018-02-14 RX ADMIN — LABETALOL HYDROCHLORIDE 20 MG: 5 INJECTION, SOLUTION INTRAVENOUS at 20:45

## 2018-02-14 RX ADMIN — LABETALOL HYDROCHLORIDE 20 MG: 5 INJECTION, SOLUTION INTRAVENOUS at 22:57

## 2018-02-14 RX ADMIN — NITROGLYCERIN 1 INCH: 20 OINTMENT TOPICAL at 23:05

## 2018-02-14 RX ADMIN — MORPHINE SULFATE 4 MG: 2 INJECTION, SOLUTION INTRAMUSCULAR; INTRAVENOUS at 20:43

## 2018-02-14 RX ADMIN — IOPAMIDOL 95 ML: 755 INJECTION, SOLUTION INTRAVENOUS at 22:32

## 2018-02-14 RX ADMIN — Medication 20 MG: at 22:12

## 2018-02-14 RX ADMIN — ACETAMINOPHEN 650 MG: 325 TABLET ORAL at 20:26

## 2018-02-14 NOTE — TELEPHONE ENCOUNTER
----- Message from LALA Barajas sent at 2/14/2018  2:33 PM CST -----  Refer to cholo jameson  Please call to notify patient of results.   See me before calling

## 2018-02-15 ENCOUNTER — APPOINTMENT (OUTPATIENT)
Dept: ULTRASOUND IMAGING | Facility: HOSPITAL | Age: 38
End: 2018-02-15

## 2018-02-15 LAB
ALBUMIN SERPL-MCNC: 3.8 G/DL (ref 3.4–4.8)
ALBUMIN/GLOB SERPL: 1.1 G/DL (ref 1.1–1.8)
ALP SERPL-CCNC: 82 U/L (ref 38–126)
ALT SERPL W P-5'-P-CCNC: 69 U/L (ref 21–72)
ANION GAP SERPL CALCULATED.3IONS-SCNC: 14 MMOL/L (ref 5–15)
AST SERPL-CCNC: 149 U/L (ref 17–59)
BASOPHILS # BLD AUTO: 0.01 10*3/MM3 (ref 0–0.2)
BASOPHILS NFR BLD AUTO: 0.1 % (ref 0–2)
BILIRUB SERPL-MCNC: 0.7 MG/DL (ref 0.2–1.3)
BUN BLD-MCNC: 13 MG/DL (ref 7–21)
BUN/CREAT SERPL: 21.7 (ref 7–25)
CALCIUM SPEC-SCNC: 9 MG/DL (ref 8.4–10.2)
CHLORIDE SERPL-SCNC: 98 MMOL/L (ref 95–110)
CK MB SERPL-CCNC: 2.01 NG/ML (ref 0–5)
CK SERPL-CCNC: 210 U/L (ref 55–170)
CO2 SERPL-SCNC: 25 MMOL/L (ref 22–31)
CREAT BLD-MCNC: 0.6 MG/DL (ref 0.7–1.3)
DEPRECATED RDW RBC AUTO: 42.8 FL (ref 35.1–43.9)
EOSINOPHIL # BLD AUTO: 0.23 10*3/MM3 (ref 0–0.7)
EOSINOPHIL NFR BLD AUTO: 3.3 % (ref 0–7)
ERYTHROCYTE [DISTWIDTH] IN BLOOD BY AUTOMATED COUNT: 14.5 % (ref 11.5–14.5)
GFR SERPL CREATININE-BSD FRML MDRD: 150 ML/MIN/1.73 (ref 70–162)
GLOBULIN UR ELPH-MCNC: 3.5 GM/DL (ref 2.3–3.5)
GLUCOSE BLD-MCNC: 231 MG/DL (ref 60–100)
GLUCOSE BLDC GLUCOMTR-MCNC: 161 MG/DL (ref 70–130)
GLUCOSE BLDC GLUCOMTR-MCNC: 185 MG/DL (ref 70–130)
GLUCOSE BLDC GLUCOMTR-MCNC: 225 MG/DL (ref 70–130)
GLUCOSE BLDC GLUCOMTR-MCNC: 280 MG/DL (ref 70–130)
GLUCOSE BLDC GLUCOMTR-MCNC: 285 MG/DL (ref 70–130)
HCT VFR BLD AUTO: 37.9 % (ref 39–49)
HGB BLD-MCNC: 12.9 G/DL (ref 13.7–17.3)
IMM GRANULOCYTES # BLD: 0.06 10*3/MM3 (ref 0–0.02)
IMM GRANULOCYTES NFR BLD: 0.9 % (ref 0–0.5)
LYMPHOCYTES # BLD AUTO: 1.21 10*3/MM3 (ref 0.6–4.2)
LYMPHOCYTES NFR BLD AUTO: 17.5 % (ref 10–50)
MCH RBC QN AUTO: 27.7 PG (ref 26.5–34)
MCHC RBC AUTO-ENTMCNC: 34 G/DL (ref 31.5–36.3)
MCV RBC AUTO: 81.3 FL (ref 80–98)
MONOCYTES # BLD AUTO: 0.49 10*3/MM3 (ref 0–0.9)
MONOCYTES NFR BLD AUTO: 7.1 % (ref 0–12)
NEUTROPHILS # BLD AUTO: 4.92 10*3/MM3 (ref 2–8.6)
NEUTROPHILS NFR BLD AUTO: 71.1 % (ref 37–80)
PLATELET # BLD AUTO: 150 10*3/MM3 (ref 150–450)
PMV BLD AUTO: 8.9 FL (ref 8–12)
POTASSIUM BLD-SCNC: 4.1 MMOL/L (ref 3.5–5.1)
PROT SERPL-MCNC: 7.3 G/DL (ref 6.3–8.6)
RBC # BLD AUTO: 4.66 10*6/MM3 (ref 4.37–5.74)
SODIUM BLD-SCNC: 137 MMOL/L (ref 137–145)
TROPONIN I SERPL-MCNC: <0.012 NG/ML
WBC NRBC COR # BLD: 6.92 10*3/MM3 (ref 3.2–9.8)

## 2018-02-15 PROCEDURE — 63710000001 INSULIN DETEMIR PER 5 UNITS: Performed by: FAMILY MEDICINE

## 2018-02-15 PROCEDURE — 93005 ELECTROCARDIOGRAM TRACING: CPT | Performed by: FAMILY MEDICINE

## 2018-02-15 PROCEDURE — 99218 PR INITIAL OBSERVATION CARE/DAY 30 MINUTES: CPT | Performed by: NURSE PRACTITIONER

## 2018-02-15 PROCEDURE — G0378 HOSPITAL OBSERVATION PER HR: HCPCS

## 2018-02-15 PROCEDURE — 94760 N-INVAS EAR/PLS OXIMETRY 1: CPT

## 2018-02-15 PROCEDURE — 96372 THER/PROPH/DIAG INJ SC/IM: CPT

## 2018-02-15 PROCEDURE — 94640 AIRWAY INHALATION TREATMENT: CPT

## 2018-02-15 PROCEDURE — 80053 COMPREHEN METABOLIC PANEL: CPT | Performed by: FAMILY MEDICINE

## 2018-02-15 PROCEDURE — 84484 ASSAY OF TROPONIN QUANT: CPT | Performed by: FAMILY MEDICINE

## 2018-02-15 PROCEDURE — 63710000001 INSULIN ASPART PER 5 UNITS: Performed by: FAMILY MEDICINE

## 2018-02-15 PROCEDURE — 96376 TX/PRO/DX INJ SAME DRUG ADON: CPT

## 2018-02-15 PROCEDURE — 85025 COMPLETE CBC W/AUTO DIFF WBC: CPT | Performed by: FAMILY MEDICINE

## 2018-02-15 PROCEDURE — 82962 GLUCOSE BLOOD TEST: CPT

## 2018-02-15 PROCEDURE — 93010 ELECTROCARDIOGRAM REPORT: CPT | Performed by: INTERNAL MEDICINE

## 2018-02-15 PROCEDURE — 25010000002 HYDRALAZINE PER 20 MG: Performed by: FAMILY MEDICINE

## 2018-02-15 PROCEDURE — 93970 EXTREMITY STUDY: CPT

## 2018-02-15 PROCEDURE — 25010000002 ENOXAPARIN PER 10 MG: Performed by: FAMILY MEDICINE

## 2018-02-15 RX ADMIN — SERTRALINE HYDROCHLORIDE 25 MG: 25 TABLET ORAL at 08:12

## 2018-02-15 RX ADMIN — INSULIN ASPART 15 UNITS: 100 INJECTION, SOLUTION INTRAVENOUS; SUBCUTANEOUS at 17:28

## 2018-02-15 RX ADMIN — ENOXAPARIN SODIUM 150 MG: 150 INJECTION SUBCUTANEOUS at 00:56

## 2018-02-15 RX ADMIN — ENOXAPARIN SODIUM 150 MG: 150 INJECTION SUBCUTANEOUS at 13:48

## 2018-02-15 RX ADMIN — TRAZODONE HYDROCHLORIDE 300 MG: 150 TABLET ORAL at 00:57

## 2018-02-15 RX ADMIN — INSULIN DETEMIR 35 UNITS: 100 INJECTION, SOLUTION SUBCUTANEOUS at 21:13

## 2018-02-15 RX ADMIN — CLOPIDOGREL BISULFATE 75 MG: 75 TABLET ORAL at 08:12

## 2018-02-15 RX ADMIN — AMLODIPINE BESYLATE 10 MG: 10 TABLET ORAL at 21:14

## 2018-02-15 RX ADMIN — INSULIN ASPART 3 UNITS: 100 INJECTION, SOLUTION INTRAVENOUS; SUBCUTANEOUS at 08:12

## 2018-02-15 RX ADMIN — ISOSORBIDE MONONITRATE 120 MG: 60 TABLET, EXTENDED RELEASE ORAL at 08:12

## 2018-02-15 RX ADMIN — LINAGLIPTIN 5 MG: 5 TABLET, FILM COATED ORAL at 08:12

## 2018-02-15 RX ADMIN — TRAZODONE HYDROCHLORIDE 300 MG: 150 TABLET ORAL at 21:14

## 2018-02-15 RX ADMIN — Medication 10 MG: at 03:45

## 2018-02-15 RX ADMIN — INSULIN DETEMIR 35 UNITS: 100 INJECTION, SOLUTION SUBCUTANEOUS at 00:55

## 2018-02-15 RX ADMIN — ALBUTEROL SULFATE 1.25 MG: 2.5 SOLUTION RESPIRATORY (INHALATION) at 11:09

## 2018-02-15 RX ADMIN — PANTOPRAZOLE SODIUM 40 MG: 40 TABLET, DELAYED RELEASE ORAL at 00:57

## 2018-02-15 RX ADMIN — METOPROLOL SUCCINATE 200 MG: 100 TABLET, EXTENDED RELEASE ORAL at 08:12

## 2018-02-15 RX ADMIN — RANOLAZINE 1000 MG: 500 TABLET, FILM COATED, EXTENDED RELEASE ORAL at 00:56

## 2018-02-15 RX ADMIN — HYDROCODONE BITARTRATE AND ACETAMINOPHEN 1 TABLET: 10; 325 TABLET ORAL at 08:16

## 2018-02-15 RX ADMIN — RANOLAZINE 1000 MG: 500 TABLET, FILM COATED, EXTENDED RELEASE ORAL at 21:13

## 2018-02-15 RX ADMIN — HYDROCODONE BITARTRATE AND ACETAMINOPHEN 1 TABLET: 10; 325 TABLET ORAL at 13:48

## 2018-02-15 RX ADMIN — ATORVASTATIN CALCIUM 10 MG: 10 TABLET, FILM COATED ORAL at 08:12

## 2018-02-15 RX ADMIN — INSULIN ASPART 2 UNITS: 100 INJECTION, SOLUTION INTRAVENOUS; SUBCUTANEOUS at 17:27

## 2018-02-15 RX ADMIN — PRAMIPEXOLE DIHYDROCHLORIDE 2 MG: 1 TABLET ORAL at 21:13

## 2018-02-15 RX ADMIN — HYDROCODONE BITARTRATE AND ACETAMINOPHEN 1 TABLET: 10; 325 TABLET ORAL at 21:54

## 2018-02-15 RX ADMIN — PRAMIPEXOLE DIHYDROCHLORIDE 2 MG: 1 TABLET ORAL at 00:56

## 2018-02-15 RX ADMIN — HYDROCODONE BITARTRATE AND ACETAMINOPHEN 1 TABLET: 10; 325 TABLET ORAL at 01:58

## 2018-02-15 RX ADMIN — AMLODIPINE BESYLATE 10 MG: 10 TABLET ORAL at 00:56

## 2018-02-15 RX ADMIN — INSULIN ASPART 4 UNITS: 100 INJECTION, SOLUTION INTRAVENOUS; SUBCUTANEOUS at 00:56

## 2018-02-15 RX ADMIN — LISINOPRIL 40 MG: 40 TABLET ORAL at 06:09

## 2018-02-15 RX ADMIN — ASPIRIN 81 MG: 81 TABLET, COATED ORAL at 08:12

## 2018-02-15 RX ADMIN — INSULIN ASPART 4 UNITS: 100 INJECTION, SOLUTION INTRAVENOUS; SUBCUTANEOUS at 10:52

## 2018-02-15 RX ADMIN — RIVAROXABAN 20 MG: 10 TABLET, FILM COATED ORAL at 17:29

## 2018-02-15 RX ADMIN — PANTOPRAZOLE SODIUM 40 MG: 40 TABLET, DELAYED RELEASE ORAL at 21:14

## 2018-02-15 RX ADMIN — INSULIN ASPART 15 UNITS: 100 INJECTION, SOLUTION INTRAVENOUS; SUBCUTANEOUS at 10:51

## 2018-02-15 RX ADMIN — RANOLAZINE 1000 MG: 500 TABLET, FILM COATED, EXTENDED RELEASE ORAL at 08:12

## 2018-02-15 RX ADMIN — INSULIN ASPART 15 UNITS: 100 INJECTION, SOLUTION INTRAVENOUS; SUBCUTANEOUS at 08:11

## 2018-02-15 NOTE — H&P
[address] [address]  T - [id number]     H&P         SUBJECTIVE:   Patient Care Team:  LALA Barajas as PCP - General (Nurse Practitioner)    Chief Complaint:     Chief Complaint   Patient presents with   • Shortness of Breath       Patient is 37 y.o. male presents with Past medical history of chronic back pain, chronic pulmonary embolism, class III obesity, coronary artery disease, essential hypertension, factor II deficiency, GERD, mixed hyperlipidemia, restless leg syndrome, presented to the ED with a complaint of having shortness of breath which started this morning, with some chest pain.  Patient state that it felt like smothering chest pain, around 6 out of 10, continued to get worse as the day went along.  Patient state that he has not missed his blood thinner.  In the ED patient was found to be hypertensive with systolic blood pressure over 200 and dyspneic.  CTA was performed however the timing of contrast and actual performing a CT was  Not optimal, therefore could not rule out peripheral PE however it did not show any central or massive pulmonary embolism.  Patient is found to have left lower extremity slightly larger than the right, with mild tenderness.     HPI     ROS/HISTORY/ CURRENT MEDICATIONS/OBJECTIVE/VS/PE:   Review of Systems:   Review of Systems   Constitutional: Positive for activity change and appetite change. Negative for diaphoresis, fatigue and fever.   HENT: Negative for rhinorrhea and sinus pressure.    Respiratory: Positive for chest tightness and shortness of breath. Negative for cough and wheezing.    Cardiovascular: Positive for chest pain and leg swelling. Negative for palpitations.   Gastrointestinal: Negative for abdominal pain, constipation, diarrhea, nausea and vomiting.   Genitourinary: Negative for dysuria, frequency and urgency.   Musculoskeletal: Negative for joint swelling.   Neurological: Negative for dizziness,  weakness and light-headedness.   Hematological: Negative for adenopathy. Does not bruise/bleed easily.   Psychiatric/Behavioral: Negative for agitation, behavioral problems and confusion.       History:     Past Medical History:   Diagnosis Date   • Chest pain    • Chronic back pain    • Coronary artery disease    • Diabetes mellitus    • GERD (gastroesophageal reflux disease)    • Hyperlipidemia    • Hypertension    • Morbid obesity    • Pulmonary embolism    • RLS (restless legs syndrome)    • Seizures    • Sleep apnea      Past Surgical History:   Procedure Laterality Date   • ADENOIDECTOMY     • CARDIAC CATHETERIZATION N/A 3/15/2017    Procedure: Left Heart Cath;  Surgeon: Edwige Briceno MD;  Location: [facility] CATH INVASIVE LOCATION;  Service:    • CARDIAC CATHETERIZATION N/A 3/15/2017    Procedure: Stent SOPHIA coronary;  Surgeon: Edwige Briceno MD;  Location: [facility] CATH INVASIVE LOCATION;  Service:    • CHOLECYSTECTOMY     • CORONARY ANGIOPLASTY WITH STENT PLACEMENT     • MS RT/LT HEART CATHETERS N/A 3/15/2017    Procedure: Percutaneous Coronary Intervention;  Surgeon: Edwige Briceno MD;  Location: [facility] CATH INVASIVE LOCATION;  Service: Cardiovascular   • TONSILLECTOMY     • TRANSESOPHAGEAL ECHOCARDIOGRAM (MONICA)       Family History   Problem Relation Age of Onset   • Heart disease Mother    • Hypertension Mother    • Hyperlipidemia Mother    • Coronary artery disease Mother    • Cancer Paternal Grandfather      Social History   Substance Use Topics   • Smoking status: Former Smoker     Quit date: 1997   • Smokeless tobacco: Current User     Types: Snuff   • Alcohol use No     Prescriptions Prior to Admission   Medication Sig Dispense Refill Last Dose   • albuterol (ACCUNEB) 1.25 MG/3ML nebulizer solution Take 3 mL by nebulization Every 6 (Six) Hours As Needed for Wheezing. 100 vial 0 Taking   • albuterol (PROVENTIL HFA;VENTOLIN HFA) 108 (90 BASE) MCG/ACT inhaler Inhale 2 puffs Every 4 (Four) Hours As Needed for  Wheezing.   Taking   • amLODIPine (NORVASC) 10 MG tablet Take 1 tablet by mouth Every Night. 30 tablet 5 Taking   • aspirin 81 MG EC tablet Take 1 tablet by mouth Daily. 30 tablet 0 Taking   • clopidogrel (PLAVIX) 75 MG tablet Take 1 tablet by mouth Daily. 30 tablet 3 Taking   • glucose blood test strip One touch Verio strips 4x daily test PRN for blood sugar 100 each 6 Taking   • glucose monitor monitoring kit 1 each As Needed (check blood glucose 3x daily). 1 each 0 Taking   • HYDROcodone-acetaminophen (NORCO)  MG per tablet Take 1 tablet by mouth Every 6 (Six) Hours As Needed for Moderate Pain . 15 tablet 0 Taking   • insulin aspart (novoLOG) 100 UNIT/ML injection Inject 15 Units under the skin 3 (Three) Times a Day Before Meals. 10 mL 3 Taking   • insulin detemir (LEVEMIR) 100 UNIT/ML injection Inject 35 Units under the skin Every Night. 10 mL 3 Taking   • isosorbide mononitrate (IMDUR) 120 MG 24 hr tablet TAKE ONE TABLET BY MOUTH ONCE DAILY 30 tablet 0 Taking   • linagliptin (TRADJENTA) 5 MG tablet tablet Take 1 tablet by mouth Daily. 30 tablet 1 Taking   • lisinopril (PRINIVIL,ZESTRIL) 40 MG tablet Take 1 tablet by mouth Every Morning. 30 tablet 5 Taking   • meclizine (ANTIVERT) 25 MG tablet Take 1 tablet by mouth 3 (Three) Times a Day As Needed for dizziness. 30 tablet 0    • metFORMIN (GLUCOPHAGE) 1000 MG tablet Take 1 tablet by mouth 2 (Two) Times a Day With Meals. 60 tablet 2 Taking   • metoprolol succinate XL (TOPROL XL) 200 MG 24 hr tablet Take 1 tablet by mouth Daily. 31 tablet 6 Taking   • MICROLET LANCETS misc One touch delica lancets 100 each 5 Taking   • nitroglycerin (NITROSTAT) 0.4 MG SL tablet Place 1 tablet under the tongue Every 5 (Five) Minutes As Needed for Chest Pain. Take no more than 3 doses in 15 minutes. 100 tablet 12 Taking   • ondansetron ODT (ZOFRAN-ODT) 4 MG disintegrating tablet Take 1 tablet by mouth Every 6 (Six) Hours As Needed for Nausea or Vomiting. 10 tablet 0 Taking   •  "pantoprazole (PROTONIX) 40 MG EC tablet Take 1 tablet by mouth Every Night. 30 tablet 5 Taking   • pramipexole (MIRAPEX) 1 MG tablet Take 2 tablets by mouth Every Night. Taking 2mg daily 60 tablet 5 Taking   • pravastatin (PRAVACHOL) 80 MG tablet Take 1 tablet by mouth Every Night. 30 tablet 5 Taking   • ranolazine (RANEXA) 1000 MG 12 hr tablet Take 1 tablet by mouth Every 12 (Twelve) Hours. 60 tablet 5 Taking   • RELION INSULIN SYRINGE 31G X 15/64\" 0.5 ML misc    Taking   • rivaroxaban (XARELTO) 20 MG tablet Take 1 tablet by mouth Daily. 30 tablet 4 Taking   • sertraline (ZOLOFT) 25 MG tablet Take 1 tablet by mouth Daily. 30 tablet 5 Taking   • traZODone (DESYREL) 300 MG tablet Take 1 tablet by mouth Every Night. 30 tablet 5 Taking     Allergies:  Glipizide; Reglan [metoclopramide]; Tramadol; and Risperdal [risperidone]    Current Medications:     Current Facility-Administered Medications   Medication Dose Route Frequency Provider Last Rate Last Dose   • albuterol (PROVENTIL) nebulizer solution 0.083% 2.5 mg/3mL  1.25 mg Nebulization Q6H PRN Gonsalo Hogue MD       • amLODIPine (NORVASC) tablet 10 mg  10 mg Oral Nightly Gonsalo Hogue MD       • aspirin EC tablet 81 mg  81 mg Oral Daily Gonsalo Hogue MD       • atorvastatin (LIPITOR) tablet 10 mg  10 mg Oral Daily Gonsalo Hogue MD       • clopidogrel (PLAVIX) tablet 75 mg  75 mg Oral Daily Gonsalo Hogue MD       • dextrose (D50W) solution 25 g  25 g Intravenous Q15 Min PRN Gonsalo Hogue MD       • dextrose (GLUTOSE) oral gel 15 g  15 g Oral Q15 Min PRN Gonsalo Hogue MD       • enoxaparin (LOVENOX) injection 190 mg  0.884 mg/kg Subcutaneous Q12H Gonsalo Hogue MD       • glucagon (human recombinant) (GLUCAGEN DIAGNOSTIC) injection 1 mg  1 mg Subcutaneous PRN Gonsalo Hogue MD       • hydrALAZINE (APRESOLINE) injection 10 mg  10 mg Intravenous Q8H PRN Gonsalo Hogue MD       • HYDROcodone-acetaminophen (NORCO)  MG per tablet 1 tablet  1 tablet Oral Q6H PRN Gonsalo " MD Mykel       • insulin aspart (novoLOG) injection 0-7 Units  0-7 Units Subcutaneous 4x Daily AC & at Bedtime Gonsalo Hogue MD       • insulin aspart (novoLOG) injection 15 Units  15 Units Subcutaneous TID AC Gonsalo Hogue MD       • insulin detemir (LEVEMIR) injection 35 Units  35 Units Subcutaneous Nightly Gonsalo Hogue MD       • isosorbide mononitrate (IMDUR) 24 hr tablet 120 mg  120 mg Oral Q24H Gonsalo Hogue MD       • linagliptin (TRADJENTA) tablet 5 mg  5 mg Oral Daily Gonsalo Hogue MD       • lisinopril (PRINIVIL,ZESTRIL) tablet 40 mg  40 mg Oral QAM Gonsalo Hogue MD       • meclizine (ANTIVERT) tablet 25 mg  25 mg Oral TID PRN Gonsalo Hogue MD       • metoprolol succinate XL (TOPROL-XL) 24 hr tablet 200 mg  200 mg Oral Daily Gonsalo Hogue MD       • morphine injection 1 mg  1 mg Intravenous Q4H PRN Gonsalo Hogue MD        And   • naloxone (NARCAN) injection 0.4 mg  0.4 mg Intravenous Q5 Min PRN Gonsalo Hogue MD       • nitroglycerin (NITROSTAT) SL tablet 0.4 mg  0.4 mg Sublingual Q5 Min PRN Gonsalo Hogue MD       • ondansetron ODT (ZOFRAN-ODT) disintegrating tablet 4 mg  4 mg Oral Q6H PRN Gonsalo Hogue MD       • pantoprazole (PROTONIX) EC tablet 40 mg  40 mg Oral Nightly Gonsalo Hogue MD       • pramipexole (MIRAPEX) tablet 2 mg  2 mg Oral Nightly Gonsalo Hogue MD       • ranolazine (RANEXA) 12 hr tablet 1,000 mg  1,000 mg Oral Q12H Gonsalo Hogue MD       • sertraline (ZOLOFT) tablet 25 mg  25 mg Oral Daily Gonsalo Hogue MD       • sodium chloride 0.9 % flush 1-10 mL  1-10 mL Intravenous PRN Gonsalo Hogue MD       • sodium chloride 0.9 % flush 10 mL  10 mL Intravenous PRN Santosh Sparrow MD       • traZODone (DESYREL) tablet 300 mg  300 mg Oral Nightly Gonsalo Hogue MD           Physical Exam:     Vital Sign Min/Max for last 24 hours  Temp  Min: 97.3 °F (36.3 °C)  Max: 98.2 °F (36.8 °C)   BP  Min: 161/67  Max: 204/91   Pulse  Min: 78  Max: 84   Resp  Min: 18  Max: 25   SpO2  Min: 94 %  Max: 98 %   No  Data Recorded   Weight  Min: 215 kg (473 lb)  Max: 215 kg (474 lb 9.6 oz)       Physical Exam:    Physical Exam   Constitutional: He is oriented to person, place, and time. He appears well-developed and well-nourished.   HENT:   Head: Normocephalic and atraumatic.   Eyes: EOM are normal. Pupils are equal, round, and reactive to light.   Neck: Normal range of motion. Neck supple.   Cardiovascular: Normal rate, regular rhythm and normal heart sounds.    Pulmonary/Chest: Effort normal and breath sounds normal. No respiratory distress. He has no wheezes. He has no rales.   Abdominal: Soft. Bowel sounds are normal.   Musculoskeletal: Normal range of motion. He exhibits edema (swelling noted in both lower ext, worse on the left than right. right side calf measure 19.5 inch and left is about 21.25 inch) and tenderness (in left lower ext. ).   Neurological: He is alert and oriented to person, place, and time.   Skin: Skin is warm.   Psychiatric: He has a normal mood and affect. His behavior is normal.   Vitals reviewed.       Results Review:   Lab Results (last 24 hours)     Procedure Component Value Units Date/Time    Blood Gas, Arterial [[id number]]  (Abnormal) Collected:  02/14/18 2022    Specimen:  Arterial Blood from Arm, Right Updated:  02/14/18 2027     Site --      Not performed at this site.        Cory's Test --      Not performed at this site.        pH, Arterial 7.398 pH units      pCO2, Arterial 42.7 mm Hg      pO2, Arterial 76.6 (L) mm Hg      HCO3, Arterial 25.7 mmol/L      Base Excess, Arterial 0.7 mmol/L      O2 Saturation, Arterial 94.3 %      Hemoglobin, Blood Gas 13.7 (L) g/dL      Hematocrit, Blood Gas 40.0 %      CO2 Content 27.0     Sodium, Arterial 133.2 (L) mmol/L      Potassium, Arterial 4.11 mmol/L      Glucose, Arterial 295 mmol/L      Barometric Pressure for Blood Gas -- mmHg       Not performed at this site.        Modality --      Not performed at this site.        Ionized Calcium 4.80 mg/dL      CBC & Differential [[id number]] Collected:  02/14/18 2042    Specimen:  Blood Updated:  02/14/18 2104    Narrative:       The following orders were created for panel order CBC & Differential.  Procedure                               Abnormality         Status                     ---------                               -----------         ------                     CBC Auto Differential[[id number]]        Abnormal            Final result                 Please view results for these tests on the individual orders.    CBC Auto Differential [[id number]]  (Abnormal) Collected:  02/14/18 2042    Specimen:  Blood Updated:  02/14/18 2104     WBC 7.38 10*3/mm3      RBC 4.80 10*6/mm3      Hemoglobin 13.6 (L) g/dL      Hematocrit 39.8 %      MCV 82.9 fL      MCH 28.3 pg      MCHC 34.2 g/dL      RDW 14.4 %      RDW-SD 43.7 fl      MPV 8.5 fL      Platelets 187 10*3/mm3      Neutrophil % 67.4 %      Lymphocyte % 19.4 %      Monocyte % 8.3 %      Eosinophil % 3.9 %      Basophil % 0.1 %      Immature Grans % 0.9 (H) %      Neutrophils, Absolute 4.97 10*3/mm3      Lymphocytes, Absolute 1.43 10*3/mm3      Monocytes, Absolute 0.61 10*3/mm3      Eosinophils, Absolute 0.29 10*3/mm3      Basophils, Absolute 0.01 10*3/mm3      Immature Grans, Absolute 0.07 (H) 10*3/mm3     Magnesium [[id number]]  (Normal) Collected:  02/14/18 2042    Specimen:  Blood Updated:  02/14/18 2107     Magnesium 1.7 mg/dL     Comprehensive Metabolic Panel [[id number]]  (Abnormal) Collected:  02/14/18 2042    Specimen:  Blood Updated:  02/14/18 2109     Glucose 294 (H) mg/dL      BUN 13 mg/dL      Creatinine 0.79 mg/dL      Sodium 137 mmol/L      Potassium 4.2 mmol/L      Chloride 95 mmol/L      CO2 29.0 mmol/L      Calcium 9.2 mg/dL      Total Protein 7.8 g/dL      Albumin 4.10 g/dL      ALT (SGPT) 30 U/L      AST (SGOT) 24 U/L      Alkaline Phosphatase 79 U/L      Total Bilirubin 0.3 mg/dL      eGFR Non African Amer 109 mL/min/1.73      Globulin 3.7 (H) gm/dL       A/G Ratio 1.1 g/dL      BUN/Creatinine Ratio 16.5     Anion Gap 13.0 mmol/L     Protime-INR [[id number]]  (Normal) Collected:  02/14/18 2042    Specimen:  Blood Updated:  02/14/18 2112     Protime 12.6 Seconds      INR 0.95    Narrative:       Therapeutic range for most indications is 2.0-3.0 INR,  or 2.5-3.5 for mechanical heart valves.    aPTT [[id number]]  (Normal) Collected:  02/14/18 2042    Specimen:  Blood Updated:  02/14/18 2112     PTT 34.3 seconds     Narrative:       The recommended Heparin therapeutic range is 68-97 seconds.    Troponin [[id number]]  (Normal) Collected:  02/14/18 2042    Specimen:  Blood Updated:  02/14/18 2119     Troponin I <0.012 ng/mL     BNP [[id number]]  (Normal) Collected:  02/14/18 2042    Specimen:  Blood Updated:  02/14/18 2121     proBNP 30.2 pg/mL     Extra Tubes [[id number]] Collected:  02/14/18 2042    Specimen:  Blood from Blood, Venous Line Updated:  02/14/18 2146    Narrative:       The following orders were created for panel order Extra Tubes.  Procedure                               Abnormality         Status                     ---------                               -----------         ------                     Gold Top - SST[[id number]]                                   Final result                 Please view results for these tests on the individual orders.    Gold Top - SST [[id number]] Collected:  02/14/18 2042    Specimen:  Blood Updated:  02/14/18 2146     Extra Tube Hold for add-ons.      Auto resulted.       POC Glucose Once [[id number]]  (Abnormal) Collected:  02/15/18 0003    Specimen:  Blood Updated:  02/15/18 0014     Glucose 285 (H) mg/dL       RN NotifiedMeter: [id number]: [id number] CHESNEL LULI       CK [[id number]]  (Abnormal) Collected:  02/14/18 2042    Specimen:  Blood Updated:  02/15/18 0017     Creatine Kinase 210 (H) U/L     CK-MB [[id number]]  (Normal) Collected:  02/14/18 2042    Specimen:  Blood Updated:  02/15/18 0027     CKMB 2.01  ng/mL               Imaging Results (last 24 hours)     Procedure Component Value Units Date/Time    CT Head Without Contrast [[id number]] Collected:  02/14/18 2130     Updated:  02/14/18 2249    Narrative:         CT head without contrast on  2/14/2018    CLINICAL INDICATION: Headache, hypertensive urgency    TECHNIQUE: Multiple axial images are obtained throughout the head  without the administration of contrast. This study was performed  with techniques to keep radiation doses as low as reasonably  achievable, (ALARA).   Total DLP is 971.1 mGy*cm.    COMPARISON: 2/9/2018    FINDINGS:   There is no hydrocephalus. There is no CT evidence of  acute infarct. There is no hemorrhage. There are no abnormal  extra-axial fluid collections. There is no mass, mass effect or  midline shift. No bony abnormality is noted.      Impression:       No acute intracranial abnormality.    Electronically signed by:  Pelon Anderson  2/14/2018 10:48 PM  [facility] Workstation: [id number]    CT Angiogram Chest With Contrast [[id number]] Collected:  02/14/18 2137     Updated:  02/14/18 2255    Narrative:         CT angiogram chest with contrast on 2/14/2018     CLINICAL INDICATION: Shortness of breath, history of pulmonary  embolus    TECHNIQUE: Multiple axial images are obtained throughout the  chest following the administration of IV contrast.  Computer  generated 3D reconstructions/MIPS were performed. This study was  performed with techniques to keep radiation doses as low as  reasonably achievable, (ALARA).   Total DLP is 1211.7 mGy*cm.    COMPARISON: 1/2/2018     FINDINGS: Bolus timing is significantly suboptimal for evaluation  of pulmonary embolus. No gross large or central filling defect is  noted but cannot exclude pulmonary emboli on this exam.  Examination is also limited by streak artifact from patient's  body habitus. There is no aortic aneurysm or dissection. There is  no pleural or pericardial effusion. Limited visualized  upper  abdomen is unremarkable. There is no thoracic adenopathy. The  lungs are clear. Couple of old left rib fractures are noted. No  acute bony abnormality is noted.      Impression:       1. Bolus timing is markedly suboptimal for evaluation of  pulmonary embolus with no evidence of a gross large or central  pulmonary embolus but cannot otherwise exclude pulmonary emboli  on this exam.  2. Otherwise essentially unremarkable.    Electronically signed by:  Pelon Anderson  2/14/2018 10:54 PM  [facility] Workstation: [id number]           I reviewed the patient's new clinical results.  I reviewed the patient's new imaging results and agree with the interpretation.     ASSESSMENT/PLAN:   Assessment/Plan   Active Hospital Problems (** Indicates Principal Problem)    Diagnosis Date Noted   • Coronary artery disease involving native coronary artery of native heart [I25.10] 12/21/2017     -Continue antiplatelet therapy: Plavix  -Continue antihypertensive therapy: Metoprolol, Imdur, Norvasc  -Monitoring cardiac activity on telemetry  -Monitoring blood pressures per hospital protocol     • Type 2 diabetes mellitus with hyperglycemia, without long-term current use of insulin [E11.65] 12/21/2017     -will continue home insulin regimen  -will add sliding scale insulin   -holding metformin      • 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/30/2017   • Chronic pulmonary embolism [I27.82] 04/22/2017   • Chronic back pain [M54.9, G89.29]      -continue home pain medication      • Essential hypertension [I10]      -Continue antihypertensive therapy  -Monitoring her blood pressure per hospital protocol     • GERD (gastroesophageal reflux disease) [K21.9]    • RLS (restless legs syndrome) [G25.81]    • Chest pain [R07.9] 03/25/2017   • Mixed hyperlipidemia [E78.2] 12/13/2016     -Switch patient's statin therapy from pravastatin to atorvastatin; will need prescription upon discharge     • Class 3 obesity due to excess calories with serious  comorbidity and body mass index (BMI) of 60.0 to 69.9 in adult [E66.09, Z68.44] 07/28/2016     -Consult to nutrition to provide information on dieting and calorie counting        Resolved Hospital Problems    Diagnosis Date Noted Date Resolved   No resolved problems to display.     1.  Chest pain: Trend cardiac enzymes, continue with MONAtherapy  2.  Chronic pulmonary embolism: Patient has been taken Xarelto 20 mg daily, he has not missed his medication, CTA does not show any large or central pulmonary embolism however could not rule out pulmonary embolism, will obtain ultrasound of both lower extremities. Will hold off on xarelto and will start on levonx.   3.  Coronary artery disease: Continue with aspirin, beta blocker, Plavix.  4.  Hypertensive urgency: Currently blood pressure in 169/91.  We'll continue to monitor, start home medication,*when necessary medication as well.  5.  Morbid obesity  6.  Hyperlipidemia: Continue with statin.  7.  Type 2 diabetes mellitus: Continue with home dose of insulin, and sliding scale insulin.    DVT ppx: lovenox     I discussed the patients findings and my recommendations with patient and nursing staff.              This document has been electronically signed by Gonsalo Hogue MD on February 15, 2018 12:29 AM

## 2018-02-15 NOTE — CONSULTS
"Adult Nutrition  Assessment    Patient Name:  Yordy Hansen  YOB: 1980  MRN: [id number]  Admit Date:  2/14/2018    Assessment Date:  2/15/2018    Comments:  BMI 66 with pt at 271% IBW.  Pt declined Wt Loss diet ed and info indicating he had received ed previously and had info. Reported 7 lb wt loss achieved by avoiding chips, soft drinks and using Crystal Lite.  Requested info regarding ADA Diet.  Basic ADA Diet Guidelines used to provide ed regarding ADA diet/Carbohydrate Counting and diet copy given.          Reason for Assessment       02/15/18 1530    Reason for Assessment    Reason For Assessment/Visit education;per organizational policy   Wt Loss diet    Identified At Risk By Screening Criteria need for education;BMI                  Anthropometrics       02/15/18 1530    Anthropometrics    Height 180.3 cm (70.98\")    Weight (!)  215 kg (473 lb 15.8 oz)    Ideal Body Weight (IBW)    Ideal Body Weight (IBW), Male (kg) 79.23    % Ideal Body Weight 271.93    Body Mass Index (BMI)    BMI (kg/m2) 66.28    BMI Grade greater than 40 - obesity grade III                Estimated/Assessed Needs       02/15/18 1531    Calculation Measurements    Weight Used For Calculations 112 kg (247 lb 9.2 oz)    Height Used for Calculations 1.803 m (5' 10.98\")    Estimated/Assessed Energy Needs    Energy Need Method Driscoll-St Jeor    Age 37    RMR (Driscoll-St. Jeor Equation) 2059.87    Activity Factors (Driscoll St Jeor)  Out of bed, ambulatory  1.3    Total estimated needs (Driscoll St. Jeor) 2677    Estimated/Assessed Protein Needs    Weight Used for Protein Calculation 112 kg (247 lb 9.2 oz)    Estimated Protein Range 112 - 134    Estimated/Assessed Fluid Needs    Fluid Need Method --   3369                Electronically signed by:  Ananya Oglesby RD  02/15/18 3:35 PM  "

## 2018-02-15 NOTE — CONSULTS
CVTS CONSULTATION  Pt known to CVTS   CVTS/Dr Peña requested to see Mr Hansen in consultation.    Patient Care Team:  LALA Barajas as PCP - General (Nurse Practitioner)    Chief complaint:  Chest pain, SOA, LLE edema     Subjective   PCP MD Ronald Tam MD     History of Present Illness  History of Present Illness: 37 y.o. Male on chronic anticoagulation for chronic pulmonary embolus  with known CAD SP DUE stent X2 to LAD in June 2016 for which he continues on Plavix which he has tolerated well, he stopped smoking then. He is morbid obesity. He states he is not sedentary, and has no prolonged sitting. 2016 CTA pulmonary demonstrated RLL X 2 partial filling defects Echo: EF 50% Mild MR/TR No RV strain noted. Lower extremity Duplex: No DVT. CVTS saw in hospital for anticoagulation recommendations. He was originally on Coumadin therapy and wished to be switched to NOAC.  Was difficult to achieve therapeutic level.   He was changed to Xarelto. Hypercoagulable panel + Factor 2 deficiency. Admitted to [facility] with CP and chronic dyspnea.  CTA Chest inconclusive for PE.  Last + PE CTA 4/2017.  LLE Edema without erythema, mild lateral tenderness.  Venous duplex did not demonstrate DVT.  No other associated symptoms or modifying factors.    The following portions of the patient's history were reviewed and updated as appropriate: allergies, current medications, past family history, past medical history, past social history, past surgical history and problem list.  Recent images independently reviewed.  Available laboratory values reviewed.    Review of Systems   Constitutional: Positive for fatigue. Negative for activity change and chills.        Lost 7 lbs since January.    HENT: Negative for ear pain, facial swelling, nosebleeds, trouble swallowing and voice change.    Eyes: Negative for visual disturbance.   Respiratory: Positive for cough and shortness of breath. Negative for apnea and chest tightness.          Chronic R side Chest pain   BIPAP ADOLFO   Cardiovascular: Positive for chest pain and leg swelling (Left ).   Gastrointestinal: Negative for abdominal pain, diarrhea and vomiting.   Endocrine: Negative for polyuria.   Genitourinary: Negative for hematuria.   Musculoskeletal: Positive for back pain. Negative for myalgias.   Skin: Negative for color change, pallor, rash and wound.   Neurological: Positive for headaches. Negative for dizziness, seizures, syncope and light-headedness.   Hematological: Does not bruise/bleed easily.        No unexplained bruising   Psychiatric/Behavioral: Negative for behavioral problems. The patient is nervous/anxious.         Past Medical History:   Diagnosis Date   • Chest pain    • Chronic back pain    • Coronary artery disease    • Diabetes mellitus    • GERD (gastroesophageal reflux disease)    • Hyperlipidemia    • Hypertension    • Morbid obesity    • Pulmonary embolism    • RLS (restless legs syndrome)    • Seizures    • Sleep apnea      Past Surgical History:   Procedure Laterality Date   • ADENOIDECTOMY     • CARDIAC CATHETERIZATION N/A 3/15/2017    Procedure: Left Heart Cath;  Surgeon: Edwige Briceno MD;  Location: [facility] INVASIVE LOCATION;  Service:    • CARDIAC CATHETERIZATION N/A 3/15/2017    Procedure: Stent SOPHIA coronary;  Surgeon: Edwige Briceno MD;  Location: [facility] INVASIVE LOCATION;  Service:    • CHOLECYSTECTOMY     • CORONARY ANGIOPLASTY WITH STENT PLACEMENT     • FL RT/LT HEART CATHETERS N/A 3/15/2017    Procedure: Percutaneous Coronary Intervention;  Surgeon: Edwige Briceno MD;  Location: [facility] INVASIVE LOCATION;  Service: Cardiovascular   • TONSILLECTOMY     • TRANSESOPHAGEAL ECHOCARDIOGRAM (MONICA)       Family History   Problem Relation Age of Onset   • Heart disease Mother    • Hypertension Mother    • Hyperlipidemia Mother    • Coronary artery disease Mother    • Cancer Paternal Grandfather      Social History   Substance Use Topics   • Smoking  status: Former Smoker     Quit date: 1997   • Smokeless tobacco: Current User     Types: Snuff   • Alcohol use No     Prescriptions Prior to Admission   Medication Sig Dispense Refill Last Dose   • albuterol (ACCUNEB) 1.25 MG/3ML nebulizer solution Take 3 mL by nebulization Every 6 (Six) Hours As Needed for Wheezing. 100 vial 0 Taking   • albuterol (PROVENTIL HFA;VENTOLIN HFA) 108 (90 BASE) MCG/ACT inhaler Inhale 2 puffs Every 4 (Four) Hours As Needed for Wheezing.   Taking   • amLODIPine (NORVASC) 10 MG tablet Take 1 tablet by mouth Every Night. 30 tablet 5 Taking   • aspirin 81 MG EC tablet Take 1 tablet by mouth Daily. 30 tablet 0 Taking   • clopidogrel (PLAVIX) 75 MG tablet Take 1 tablet by mouth Daily. 30 tablet 3 Taking   • glucose blood test strip One touch Verio strips 4x daily test PRN for blood sugar 100 each 6 Taking   • glucose monitor monitoring kit 1 each As Needed (check blood glucose 3x daily). 1 each 0 Taking   • HYDROcodone-acetaminophen (NORCO)  MG per tablet Take 1 tablet by mouth Every 6 (Six) Hours As Needed for Moderate Pain . 15 tablet 0 Taking   • insulin aspart (novoLOG) 100 UNIT/ML injection Inject 15 Units under the skin 3 (Three) Times a Day Before Meals. 10 mL 3 Taking   • insulin detemir (LEVEMIR) 100 UNIT/ML injection Inject 35 Units under the skin Every Night. 10 mL 3 Taking   • isosorbide mononitrate (IMDUR) 120 MG 24 hr tablet TAKE ONE TABLET BY MOUTH ONCE DAILY 30 tablet 0 Taking   • linagliptin (TRADJENTA) 5 MG tablet tablet Take 1 tablet by mouth Daily. 30 tablet 1 Taking   • lisinopril (PRINIVIL,ZESTRIL) 40 MG tablet Take 1 tablet by mouth Every Morning. 30 tablet 5 Taking   • meclizine (ANTIVERT) 25 MG tablet Take 1 tablet by mouth 3 (Three) Times a Day As Needed for dizziness. 30 tablet 0    • metFORMIN (GLUCOPHAGE) 1000 MG tablet Take 1 tablet by mouth 2 (Two) Times a Day With Meals. 60 tablet 2 Taking   • metoprolol succinate XL (TOPROL XL) 200 MG 24 hr tablet Take 1  "tablet by mouth Daily. 31 tablet 6 Taking   • MICROLET LANCETS misc One touch delica lancets 100 each 5 Taking   • nitroglycerin (NITROSTAT) 0.4 MG SL tablet Place 1 tablet under the tongue Every 5 (Five) Minutes As Needed for Chest Pain. Take no more than 3 doses in 15 minutes. 100 tablet 12 Taking   • ondansetron ODT (ZOFRAN-ODT) 4 MG disintegrating tablet Take 1 tablet by mouth Every 6 (Six) Hours As Needed for Nausea or Vomiting. 10 tablet 0 Taking   • pantoprazole (PROTONIX) 40 MG EC tablet Take 1 tablet by mouth Every Night. 30 tablet 5 Taking   • pramipexole (MIRAPEX) 1 MG tablet Take 2 tablets by mouth Every Night. Taking 2mg daily 60 tablet 5 Taking   • pravastatin (PRAVACHOL) 80 MG tablet Take 1 tablet by mouth Every Night. 30 tablet 5 Taking   • ranolazine (RANEXA) 1000 MG 12 hr tablet Take 1 tablet by mouth Every 12 (Twelve) Hours. 60 tablet 5 Taking   • RELION INSULIN SYRINGE 31G X 15/64\" 0.5 ML misc    Taking   • rivaroxaban (XARELTO) 20 MG tablet Take 1 tablet by mouth Daily. 30 tablet 4 Taking   • sertraline (ZOLOFT) 25 MG tablet Take 1 tablet by mouth Daily. 30 tablet 5 Taking   • traZODone (DESYREL) 300 MG tablet Take 1 tablet by mouth Every Night. 30 tablet 5 Taking       amLODIPine 10 mg Oral Nightly   aspirin 81 mg Oral Daily   atorvastatin 10 mg Oral Daily   clopidogrel 75 mg Oral Daily   enoxaparin 150 mg Subcutaneous Q12H   insulin aspart 0-7 Units Subcutaneous 4x Daily AC & at Bedtime   insulin aspart 15 Units Subcutaneous TID AC   insulin detemir 35 Units Subcutaneous Nightly   isosorbide mononitrate 120 mg Oral Q24H   linagliptin 5 mg Oral Daily   lisinopril 40 mg Oral QAM   metoprolol succinate  mg Oral Daily   pantoprazole 40 mg Oral Nightly   pramipexole 2 mg Oral Nightly   ranolazine 1,000 mg Oral Q12H   sertraline 25 mg Oral Daily   traZODone 300 mg Oral Nightly     Allergies:  Glipizide; Reglan [metoclopramide]; Tramadol; and Risperdal [risperidone]    Objective      Vital " "Signs  Temp:  [97.3 °F (36.3 °C)-98.2 °F (36.8 °C)] 98 °F (36.7 °C)  Heart Rate:  [69-84] 73  Resp:  [18-25] 18  BP: (103-204)/(52-91) 103/52    Flowsheet Rows         First Filed Value    Admission Height  180.3 cm (71\") Documented at 02/14/2018 1959    Admission Weight  (!)  215 kg (473 lb) Documented at 02/14/2018 1951        180.3 cm (71\")    Physical Exam   Constitutional: He is oriented to person, place, and time. No distress.   Body mass index is 66.14 kg/(m^2).     HENT:   Head: Normocephalic.   Mouth/Throat: Oropharynx is clear and moist.   Eyes: Conjunctivae are normal. Pupils are equal, round, and reactive to light.   Neck: No JVD present.   Cardiovascular: Normal rate, regular rhythm, normal heart sounds and intact distal pulses.    RSR 78  Distant heart sounds    Pulmonary/Chest: Effort normal and breath sounds normal. No stridor. He has no wheezes. He has no rales. He exhibits no tenderness.   Abdominal: Soft. Bowel sounds are normal. He exhibits no distension. There is no tenderness.   Obese   Musculoskeletal: He exhibits edema (Mild LLE ). He exhibits no tenderness.   Neurological: He is alert and oriented to person, place, and time.   Skin: Skin is warm and dry. No rash noted. No erythema. No pallor.   Psychiatric: His behavior is normal. Judgment normal.   Nursing note and vitals reviewed.      Results Review:   Lab Results (last 24 hours)     Procedure Component Value Units Date/Time    Blood Gas, Arterial [[id number]]  (Abnormal) Collected:  02/14/18 2022    Specimen:  Arterial Blood from Arm, Right Updated:  02/14/18 2027     Site --      Not performed at this site.        Cory's Test --      Not performed at this site.        pH, Arterial 7.398 pH units      pCO2, Arterial 42.7 mm Hg      pO2, Arterial 76.6 (L) mm Hg      HCO3, Arterial 25.7 mmol/L      Base Excess, Arterial 0.7 mmol/L      O2 Saturation, Arterial 94.3 %      Hemoglobin, Blood Gas 13.7 (L) g/dL      Hematocrit, Blood Gas 40.0 % "      CO2 Content 27.0     Sodium, Arterial 133.2 (L) mmol/L      Potassium, Arterial 4.11 mmol/L      Glucose, Arterial 295 mmol/L      Barometric Pressure for Blood Gas -- mmHg       Not performed at this site.        Modality --      Not performed at this site.        Ionized Calcium 4.80 mg/dL     CBC & Differential [[id number]] Collected:  02/14/18 2042    Specimen:  Blood Updated:  02/14/18 2104    Narrative:       The following orders were created for panel order CBC & Differential.  Procedure                               Abnormality         Status                     ---------                               -----------         ------                     CBC Auto Differential[[id number]]        Abnormal            Final result                 Please view results for these tests on the individual orders.    CBC Auto Differential [[id number]]  (Abnormal) Collected:  02/14/18 2042    Specimen:  Blood Updated:  02/14/18 2104     WBC 7.38 10*3/mm3      RBC 4.80 10*6/mm3      Hemoglobin 13.6 (L) g/dL      Hematocrit 39.8 %      MCV 82.9 fL      MCH 28.3 pg      MCHC 34.2 g/dL      RDW 14.4 %      RDW-SD 43.7 fl      MPV 8.5 fL      Platelets 187 10*3/mm3      Neutrophil % 67.4 %      Lymphocyte % 19.4 %      Monocyte % 8.3 %      Eosinophil % 3.9 %      Basophil % 0.1 %      Immature Grans % 0.9 (H) %      Neutrophils, Absolute 4.97 10*3/mm3      Lymphocytes, Absolute 1.43 10*3/mm3      Monocytes, Absolute 0.61 10*3/mm3      Eosinophils, Absolute 0.29 10*3/mm3      Basophils, Absolute 0.01 10*3/mm3      Immature Grans, Absolute 0.07 (H) 10*3/mm3     Magnesium [[id number]]  (Normal) Collected:  02/14/18 2042    Specimen:  Blood Updated:  02/14/18 2107     Magnesium 1.7 mg/dL     Comprehensive Metabolic Panel [[id number]]  (Abnormal) Collected:  02/14/18 2042    Specimen:  Blood Updated:  02/14/18 2109     Glucose 294 (H) mg/dL      BUN 13 mg/dL      Creatinine 0.79 mg/dL      Sodium 137 mmol/L      Potassium 4.2 mmol/L       Chloride 95 mmol/L      CO2 29.0 mmol/L      Calcium 9.2 mg/dL      Total Protein 7.8 g/dL      Albumin 4.10 g/dL      ALT (SGPT) 30 U/L      AST (SGOT) 24 U/L      Alkaline Phosphatase 79 U/L      Total Bilirubin 0.3 mg/dL      eGFR Non African Amer 109 mL/min/1.73      Globulin 3.7 (H) gm/dL      A/G Ratio 1.1 g/dL      BUN/Creatinine Ratio 16.5     Anion Gap 13.0 mmol/L     Protime-INR [[id number]]  (Normal) Collected:  02/14/18 2042    Specimen:  Blood Updated:  02/14/18 2112     Protime 12.6 Seconds      INR 0.95    Narrative:       Therapeutic range for most indications is 2.0-3.0 INR,  or 2.5-3.5 for mechanical heart valves.    aPTT [[id number]]  (Normal) Collected:  02/14/18 2042    Specimen:  Blood Updated:  02/14/18 2112     PTT 34.3 seconds     Narrative:       The recommended Heparin therapeutic range is 68-97 seconds.    Troponin [[id number]]  (Normal) Collected:  02/14/18 2042    Specimen:  Blood Updated:  02/14/18 2119     Troponin I <0.012 ng/mL     BNP [[id number]]  (Normal) Collected:  02/14/18 2042    Specimen:  Blood Updated:  02/14/18 2121     proBNP 30.2 pg/mL     Extra Tubes [[id number]] Collected:  02/14/18 2042    Specimen:  Blood from Blood, Venous Line Updated:  02/14/18 2146    Narrative:       The following orders were created for panel order Extra Tubes.  Procedure                               Abnormality         Status                     ---------                               -----------         ------                     Gold Top - SST[[id number]]                                   Final result                 Please view results for these tests on the individual orders.    Gold Top - SST [[id number]] Collected:  02/14/18 2042    Specimen:  Blood Updated:  02/14/18 2146     Extra Tube Hold for add-ons.      Auto resulted.       POC Glucose Once [[id number]]  (Abnormal) Collected:  02/15/18 0003    Specimen:  Blood Updated:  02/15/18 0014     Glucose 285 (H) mg/dL       RN NotifiedMeter:  [id number]: [id number] [facility]       CK [[id number]]  (Abnormal) Collected:  02/14/18 2042    Specimen:  Blood Updated:  02/15/18 0017     Creatine Kinase 210 (H) U/L     CK-MB [[id number]]  (Normal) Collected:  02/14/18 2042    Specimen:  Blood Updated:  02/15/18 0027     CKMB 2.01 ng/mL     Troponin [[id number]]  (Normal) Collected:  02/15/18 0017    Specimen:  Blood Updated:  02/15/18 0104     Troponin I <0.012 ng/mL     CBC Auto Differential [[id number]]  (Abnormal) Collected:  02/15/18 0643    Specimen:  Blood Updated:  02/15/18 0658     WBC 6.92 10*3/mm3      RBC 4.66 10*6/mm3      Hemoglobin 12.9 (L) g/dL      Hematocrit 37.9 (L) %      MCV 81.3 fL      MCH 27.7 pg      MCHC 34.0 g/dL      RDW 14.5 %      RDW-SD 42.8 fl      MPV 8.9 fL      Platelets 150 10*3/mm3      Neutrophil % 71.1 %      Lymphocyte % 17.5 %      Monocyte % 7.1 %      Eosinophil % 3.3 %      Basophil % 0.1 %      Immature Grans % 0.9 (H) %      Neutrophils, Absolute 4.92 10*3/mm3      Lymphocytes, Absolute 1.21 10*3/mm3      Monocytes, Absolute 0.49 10*3/mm3      Eosinophils, Absolute 0.23 10*3/mm3      Basophils, Absolute 0.01 10*3/mm3      Immature Grans, Absolute 0.06 (H) 10*3/mm3     POC Glucose Once [[id number]]  (Abnormal) Collected:  02/15/18 0632    Specimen:  Blood Updated:  02/15/18 0708     Glucose 225 (H) mg/dL       RN NotifiedMeter: [id number]: [id number] [facility]       Comprehensive Metabolic Panel [[id number]]  (Abnormal) Collected:  02/15/18 0643    Specimen:  Blood Updated:  02/15/18 0727     Glucose 231 (H) mg/dL      BUN 13 mg/dL      Creatinine 0.60 (L) mg/dL      Sodium 137 mmol/L      Potassium 4.1 mmol/L      Chloride 98 mmol/L      CO2 25.0 mmol/L      Calcium 9.0 mg/dL      Total Protein 7.3 g/dL      Albumin 3.80 g/dL      ALT (SGPT) 69 U/L      AST (SGOT) 149 (H) U/L      Alkaline Phosphatase 82 U/L      Total Bilirubin 0.7 mg/dL      eGFR Non African Amer 150 mL/min/1.73       Globulin 3.5 gm/dL      A/G Ratio 1.1 g/dL      BUN/Creatinine Ratio 21.7     Anion Gap 14.0 mmol/L     Troponin [[id number]]  (Normal) Collected:  02/15/18 0643    Specimen:  Blood Updated:  02/15/18 0728     Troponin I <0.012 ng/mL     POC Glucose Once [[id number]]  (Abnormal) Collected:  02/15/18 1013    Specimen:  Blood Updated:  02/15/18 1101     Glucose 280 (H) mg/dL       RN NotifiedMeter: [id number]: [id number] CAROLYNE GARNER       Troponin [[id number]]  (Normal) Collected:  02/15/18 1202    Specimen:  Blood Updated:  02/15/18 1251     Troponin I <0.012 ng/mL         Imaging Results (last 72 hours)     Procedure Component Value Units Date/Time    CT Head Without Contrast [[id number]] Collected:  02/14/18 2130     Updated:  02/14/18 2249    Narrative:         CT head without contrast on  2/14/2018    CLINICAL INDICATION: Headache, hypertensive urgency    TECHNIQUE: Multiple axial images are obtained throughout the head  without the administration of contrast. This study was performed  with techniques to keep radiation doses as low as reasonably  achievable, (ALARA).   Total DLP is 971.1 mGy*cm.    COMPARISON: 2/9/2018    FINDINGS:   There is no hydrocephalus. There is no CT evidence of  acute infarct. There is no hemorrhage. There are no abnormal  extra-axial fluid collections. There is no mass, mass effect or  midline shift. No bony abnormality is noted.      Impression:       No acute intracranial abnormality.    Electronically signed by:  Pelon Anderson  2/14/2018 10:48 PM  CST Workstation: [id number]    CT Angiogram Chest With Contrast [[id number]] Collected:  02/14/18 2137     Updated:  02/14/18 2255    Narrative:         CT angiogram chest with contrast on 2/14/2018     CLINICAL INDICATION: Shortness of breath, history of pulmonary  embolus    TECHNIQUE: Multiple axial images are obtained throughout the  chest following the administration of IV contrast.  Computer  generated 3D  reconstructions/MIPS were performed. This study was  performed with techniques to keep radiation doses as low as  reasonably achievable, (ALARA).   Total DLP is 1211.7 mGy*cm.    COMPARISON: 1/2/2018     FINDINGS: Bolus timing is significantly suboptimal for evaluation  of pulmonary embolus. No gross large or central filling defect is  noted but cannot exclude pulmonary emboli on this exam.  Examination is also limited by streak artifact from patient's  body habitus. There is no aortic aneurysm or dissection. There is  no pleural or pericardial effusion. Limited visualized upper  abdomen is unremarkable. There is no thoracic adenopathy. The  lungs are clear. Couple of old left rib fractures are noted. No  acute bony abnormality is noted.      Impression:       1. Bolus timing is markedly suboptimal for evaluation of  pulmonary embolus with no evidence of a gross large or central  pulmonary embolus but cannot otherwise exclude pulmonary emboli  on this exam.  2. Otherwise essentially unremarkable.    Electronically signed by:  Pelon Anderson  2/14/2018 10:54 PM  CST Workstation: [id number]    US Venous Doppler Lower Extremity Bilateral (duplex) [[id number]] Collected:  02/15/18 0821     Updated:  02/15/18 0945    Narrative:       Procedure: Bilateral lower extremity venous ultrasound      CLINICAL INDICATION: swelling, R07.9 Chest pain, unspecified  I16.0 Hypertensive urgency      COMPARISON: None    FINDINGS:    The common femoral,  femoral,  popliteal and calf veins of the  bilateral  lower extremity are well identified and compress  normally.  Doppler signals are heard either spontaneously or on  distal compression.  No evidence of intraluminal thrombus was  noted.         Impression:       CONCLUSION:  No evidence of deep venous thrombosis in the common  femoral, femoral, popliteal and calf veins of the bilateral   lower extremity.    Electronically signed by:  Alvaro Ojeda MD  2/15/2018 9:44 AM  CST  Workstation: [id number]            Assessment/Plan     Active Problems:    Mixed hyperlipidemia    Class 3 obesity due to excess calories with serious comorbidity and body mass index (BMI) of 60.0 to 69.9 in adult    Chest pain    Chronic back pain    GERD (gastroesophageal reflux disease)    Essential hypertension    RLS (restless legs syndrome)    Chronic pulmonary embolism    Factor II deficiency    Coronary artery disease involving native coronary artery of native heart    Type 2 diabetes mellitus with hyperglycemia, without long-term current use of insulin      Assessment & Plan    Anticoagulation in morbid obese patient with history of chronic pulmonary embolus:.  CTA images reviewed by Dr Peña.    Case discussed with Dr Justice  If high index of suspicion of new PE, would repeat CTA.   Continue anticoagulatioin with Xarelto     Thank you for the opportunity to participate in this patient's care.    Copy to primary care provider.        I discussed the patients findings and my recommendations with Dr Peña.     Letty Paige, LALA  02/15/18  2:57 PM

## 2018-02-15 NOTE — PLAN OF CARE
Problem: Patient Care Overview (Adult)  Goal: Plan of Care Review  Outcome: Ongoing (interventions implemented as appropriate)   02/15/18 0620   Coping/Psychosocial Response Interventions   Plan Of Care Reviewed With patient   Patient Care Overview   Progress no change   Outcome Evaluation   Outcome Summary/Follow up Plan pt c/o head and chest pain, HTN resolved this AM, pt able to get few hours of sleep, venous u/s and echo today       Problem: Acute Coronary Syndrome (ACS) (Adult)  Goal: Signs and Symptoms of Listed Potential Problems Will be Absent or Manageable (Acute Coronary Syndrome)  Outcome: Ongoing (interventions implemented as appropriate)

## 2018-02-15 NOTE — PLAN OF CARE
Problem: Patient Care Overview (Adult)  Goal: Plan of Care Review  Outcome: Ongoing (interventions implemented as appropriate)   02/15/18 1006   Outcome Evaluation   Outcome Summary/Follow up Plan dyspnea with activity, pain meds given for heafdache, patient says CP is much better today     Goal: Adult Individualization and Mutuality  Outcome: Ongoing (interventions implemented as appropriate)    Goal: Discharge Needs Assessment  Outcome: Ongoing (interventions implemented as appropriate)      Problem: Acute Coronary Syndrome (ACS) (Adult)  Goal: Signs and Symptoms of Listed Potential Problems Will be Absent or Manageable (Acute Coronary Syndrome)  Outcome: Ongoing (interventions implemented as appropriate)

## 2018-02-15 NOTE — ED PROVIDER NOTES
Subjective   History of Present Illness  Patient is a 37-year-old male who presents tonight with shortness of breath.  He has a history of coronary artery disease as well as stent placement.  He also has a history of multiple pulmonary emboli that were refractory to treatment with Eliquis.    He had a headache earlier today described as generalized and mid intensity.  He has had them in the past.  He also said a little bit later he became very short of breath.  And this was at rest.  EMS was summoned.  He has no overt chest pain.  Review of Systems   Constitutional: Positive for fatigue. Negative for activity change, appetite change, chills, diaphoresis, fever and unexpected weight change.   Respiratory: Positive for shortness of breath. Negative for apnea, cough, choking, chest tightness, wheezing and stridor.    Cardiovascular: Positive for chest pain and leg swelling. Negative for palpitations.   Neurological: Positive for headaches. Negative for dizziness, tremors, seizures, syncope, facial asymmetry, speech difficulty, weakness, light-headedness and numbness.   All other systems reviewed and are negative.      Past Medical History:   Diagnosis Date   • Chest pain    • Chronic back pain    • Coronary artery disease    • Diabetes mellitus    • GERD (gastroesophageal reflux disease)    • Hyperlipidemia    • Hypertension    • Morbid obesity    • Pulmonary embolism    • RLS (restless legs syndrome)    • Seizures    • Sleep apnea        Allergies   Allergen Reactions   • Glipizide Other (See Comments)     Slurred Speech   • Reglan [Metoclopramide] Anxiety   • Tramadol Other (See Comments)     seizures   • Risperdal [Risperidone] Other (See Comments)     Slurred speech       Past Surgical History:   Procedure Laterality Date   • ADENOIDECTOMY     • CARDIAC CATHETERIZATION N/A 3/15/2017    Procedure: Left Heart Cath;  Surgeon: Edwige Briceno MD;  Location: [facility] CATH INVASIVE LOCATION;  Service:    • CARDIAC  CATHETERIZATION N/A 3/15/2017    Procedure: Stent SOPHIA coronary;  Surgeon: Edwige Briceno MD;  Location: [facility] CATH INVASIVE LOCATION;  Service:    • CHOLECYSTECTOMY     • CORONARY ANGIOPLASTY WITH STENT PLACEMENT     • NC RT/LT HEART CATHETERS N/A 3/15/2017    Procedure: Percutaneous Coronary Intervention;  Surgeon: Edwige Briceno MD;  Location: [facility] CATH INVASIVE LOCATION;  Service: Cardiovascular   • TONSILLECTOMY     • TRANSESOPHAGEAL ECHOCARDIOGRAM (MONICA)         Family History   Problem Relation Age of Onset   • Heart disease Mother    • Hypertension Mother    • Hyperlipidemia Mother    • Coronary artery disease Mother    • Cancer Paternal Grandfather        Social History     Social History   • Marital status:      Spouse name: Cori   • Number of children: 0   • Years of education: N/A     Occupational History   • Disabled      Social History Main Topics   • Smoking status: Former Smoker     Quit date: 1997   • Smokeless tobacco: Current User     Types: Snuff   • Alcohol use No   • Drug use: No   • Sexual activity: Yes     Partners: Female     Birth control/ protection: None     Other Topics Concern   • None     Social History Narrative           Objective   Physical Exam   Constitutional: He is oriented to person, place, and time.   HENT:   Head: Normocephalic and atraumatic.   Mouth/Throat: Oropharynx is clear and moist.   Eyes: Conjunctivae and EOM are normal. Pupils are equal, round, and reactive to light.   Neck: Normal range of motion. Neck supple. No tracheal deviation present. No thyromegaly present.   Cardiovascular: Normal rate, regular rhythm and normal heart sounds.    Pulmonary/Chest: Effort normal. Respiratory distress: mild. He has no wheezes. He has no rales. He exhibits no tenderness.   Abdominal: Soft. Bowel sounds are normal. He exhibits no distension and no mass. There is no tenderness. There is no rebound and no guarding.   Musculoskeletal: He exhibits edema. He exhibits no  tenderness or deformity.   Neurological: He is alert and oriented to person, place, and time.   Skin: Skin is warm and dry. No rash noted. No erythema. No pallor.   Psychiatric: He has a normal mood and affect. His behavior is normal. Judgment and thought content normal.   Nursing note and vitals reviewed.      ECG 12 Lead    Date/Time: 2/14/2018 7:53 PM  Performed by: DOMENICO ROMO  Authorized by: DOMENICO ROMO   Interpreted by physician  Comparison: not compared with previous ECG   Rhythm: sinus rhythm  Rate: normal  BPM: 77  QRS axis: normal  Clinical impression: normal ECG               ED Course  ED Course          Imaging lab work reviewed.  Dr. JULISA Hogue will admit.  Chest pain hypertensive urgency.        [facility]      Final diagnoses:   Chest pain, unspecified type   Hypertensive urgency            Domenico Romo MD  02/14/18 1931

## 2018-02-15 NOTE — PLAN OF CARE
Problem: Patient Care Overview (Adult)  Goal: Plan of Care Review  Outcome: Ongoing (interventions implemented as appropriate)  Pt declined Wt Loss diet ed indicating he had received ed previously and had info.  Basic ADA Diet Guidelines used to provide ed regarding ADA Diet/Carbhydrate Counting per pt request and diet copy given.   02/15/18 8756   Coping/Psychosocial Response Interventions   Plan Of Care Reviewed With patient   Patient Care Overview   Progress no change   Outcome Evaluation   Outcome Summary/Follow up Plan initial visit

## 2018-02-15 NOTE — PROGRESS NOTES
FAMILY MEDICINE DAILY PROGRESS NOTE  NAME: Yordy Hansen  : 1980  MRN: [id number]     LOS: 0 days     PROVIDER OF SERVICE: Santosh Newman MD    Chief Complaint: chest pain    Subjective:     Interval History:  History taken from: patient  C/o SOA this morning, chest pain is improved, still having leg pain and swelling.    Review of Systems:   Review of Systems   Constitutional: Negative for chills and fever.   HENT: Negative for congestion and sore throat.    Eyes: Negative for pain and redness.   Respiratory: Positive for chest tightness and shortness of breath.    Cardiovascular: Positive for chest pain and leg swelling.   Gastrointestinal: Negative for diarrhea, nausea and vomiting.   Genitourinary: Negative for decreased urine volume and difficulty urinating.   Musculoskeletal: Positive for arthralgias.   Skin: Negative for color change and rash.   Neurological: Negative for dizziness, weakness and headaches.   Psychiatric/Behavioral: Negative for agitation, behavioral problems and confusion.       Objective:     Vital Signs  Temp:  [97.3 °F (36.3 °C)-98.2 °F (36.8 °C)] 97.8 °F (36.6 °C)  Heart Rate:  [75-84] 77  Resp:  [18-25] 20  BP: (133-204)/(67-91) 133/82    Physical Exam  Physical Exam   Constitutional: He is oriented to person, place, and time. He appears well-developed and well-nourished.   HENT:   Head: Normocephalic and atraumatic.   Right Ear: External ear normal.   Left Ear: External ear normal.   Nose: Nose normal.   Eyes: Conjunctivae and EOM are normal. Pupils are equal, round, and reactive to light.   Neck: Normal range of motion. Neck supple.   Cardiovascular: Normal rate, regular rhythm and normal heart sounds.    Pulmonary/Chest: Effort normal. He has no wheezes.   Decreased breath sounds bilat bases   Abdominal: Soft. Bowel sounds are normal. He exhibits no distension. There is no tenderness.   Musculoskeletal: He exhibits tenderness. Edema: 2+ pitting bilat.    Neurological: He is alert and oriented to person, place, and time. No cranial nerve deficit.   Skin: Skin is warm.   Psychiatric: He has a normal mood and affect. His behavior is normal. Thought content normal.   Nursing note and vitals reviewed.      Medication Review    Current Facility-Administered Medications:   •  albuterol (PROVENTIL) nebulizer solution 0.083% 2.5 mg/3mL, 1.25 mg, Nebulization, Q6H PRN, Gonsalo Hogue MD  •  amLODIPine (NORVASC) tablet 10 mg, 10 mg, Oral, Nightly, Gonsalo Hogue MD, 10 mg at 02/15/18 0056  •  aspirin EC tablet 81 mg, 81 mg, Oral, Daily, Gonsalo Hogue MD, 81 mg at 02/15/18 0812  •  atorvastatin (LIPITOR) tablet 10 mg, 10 mg, Oral, Daily, Gonsalo Hogue MD, 10 mg at 02/15/18 0812  •  clopidogrel (PLAVIX) tablet 75 mg, 75 mg, Oral, Daily, Gonsalo Hogue MD, 75 mg at 02/15/18 0812  •  dextrose (D50W) solution 25 g, 25 g, Intravenous, Q15 Min PRN, Gonsalo Hogue MD  •  dextrose (GLUTOSE) oral gel 15 g, 15 g, Oral, Q15 Min PRN, Gonsalo Hogue MD  •  enoxaparin (LOVENOX) injection 150 mg, 150 mg, Subcutaneous, Q12H, Gonsalo Hogue MD, 150 mg at 02/15/18 0056  •  glucagon (human recombinant) (GLUCAGEN DIAGNOSTIC) injection 1 mg, 1 mg, Subcutaneous, PRN, Gonsalo Hogue MD  •  hydrALAZINE (APRESOLINE) injection 10 mg, 10 mg, Intravenous, Q8H PRN, Gonsalo Hogue MD, 10 mg at 02/15/18 0345  •  HYDROcodone-acetaminophen (NORCO)  MG per tablet 1 tablet, 1 tablet, Oral, Q6H PRN, Gonsalo Hogue MD, 1 tablet at 02/15/18 0816  •  insulin aspart (novoLOG) injection 0-7 Units, 0-7 Units, Subcutaneous, 4x Daily AC & at Bedtime, Gonsalo Hogue MD, 4 Units at 02/15/18 1052  •  insulin aspart (novoLOG) injection 15 Units, 15 Units, Subcutaneous, TID AC, Gonsalo Hogue MD, 15 Units at 02/15/18 1051  •  insulin detemir (LEVEMIR) injection 35 Units, 35 Units, Subcutaneous, Nightly, Gonsalo Hogue MD, 35 Units at 02/15/18 0055  •  isosorbide mononitrate (IMDUR) 24 hr tablet 120 mg, 120 mg, Oral, Q24H, Gonsalo  MD Mykel, 120 mg at 02/15/18 0812  •  linagliptin (TRADJENTA) tablet 5 mg, 5 mg, Oral, Daily, Gonsalo Hogue MD, 5 mg at 02/15/18 0812  •  lisinopril (PRINIVIL,ZESTRIL) tablet 40 mg, 40 mg, Oral, QAM, Gonsalo Hogue MD, 40 mg at 02/15/18 0609  •  meclizine (ANTIVERT) tablet 25 mg, 25 mg, Oral, TID PRN, Gonsalo Hogue MD  •  metoprolol succinate XL (TOPROL-XL) 24 hr tablet 200 mg, 200 mg, Oral, Daily, Gonsalo Hogue MD, 200 mg at 02/15/18 0812  •  morphine injection 1 mg, 1 mg, Intravenous, Q4H PRN **AND** naloxone (NARCAN) injection 0.4 mg, 0.4 mg, Intravenous, Q5 Min PRN, Gonsalo Hogue MD  •  nitroglycerin (NITROSTAT) SL tablet 0.4 mg, 0.4 mg, Sublingual, Q5 Min PRN, Gonsalo Hogue MD  •  ondansetron ODT (ZOFRAN-ODT) disintegrating tablet 4 mg, 4 mg, Oral, Q6H PRN, Gonsalo Hogue MD  •  pantoprazole (PROTONIX) EC tablet 40 mg, 40 mg, Oral, Nightly, Gonsalo Hogue MD, 40 mg at 02/15/18 0057  •  pramipexole (MIRAPEX) tablet 2 mg, 2 mg, Oral, Nightly, Gonsalo Hogue MD, 2 mg at 02/15/18 0056  •  ranolazine (RANEXA) 12 hr tablet 1,000 mg, 1,000 mg, Oral, Q12H, Gonsalo Hogue MD, 1,000 mg at 02/15/18 0812  •  sertraline (ZOLOFT) tablet 25 mg, 25 mg, Oral, Daily, Gonsalo Hogue MD, 25 mg at 02/15/18 0812  •  sodium chloride 0.9 % flush 1-10 mL, 1-10 mL, Intravenous, PRN, Gonsalo Hogue MD  •  Insert peripheral IV, , , Once **AND** sodium chloride 0.9 % flush 10 mL, 10 mL, Intravenous, PRN, Santosh Sparrow MD  •  traZODone (DESYREL) tablet 300 mg, 300 mg, Oral, Nightly, Gonsalo Hogue MD, 300 mg at 02/15/18 0057     Diagnostic Data    Lab Results (last 24 hours)     Procedure Component Value Units Date/Time    Blood Gas, Arterial [[id number]]  (Abnormal) Collected:  02/14/18 2022    Specimen:  Arterial Blood from Arm, Right Updated:  02/14/18 2027     Site --      Not performed at this site.        Cory's Test --      Not performed at this site.        pH, Arterial 7.398 pH units      pCO2, Arterial 42.7 mm Hg      pO2, Arterial  76.6 (L) mm Hg      HCO3, Arterial 25.7 mmol/L      Base Excess, Arterial 0.7 mmol/L      O2 Saturation, Arterial 94.3 %      Hemoglobin, Blood Gas 13.7 (L) g/dL      Hematocrit, Blood Gas 40.0 %      CO2 Content 27.0     Sodium, Arterial 133.2 (L) mmol/L      Potassium, Arterial 4.11 mmol/L      Glucose, Arterial 295 mmol/L      Barometric Pressure for Blood Gas -- mmHg       Not performed at this site.        Modality --      Not performed at this site.        Ionized Calcium 4.80 mg/dL     CBC & Differential [[id number]] Collected:  02/14/18 2042    Specimen:  Blood Updated:  02/14/18 2104    Narrative:       The following orders were created for panel order CBC & Differential.  Procedure                               Abnormality         Status                     ---------                               -----------         ------                     CBC Auto Differential[[id number]]        Abnormal            Final result                 Please view results for these tests on the individual orders.    CBC Auto Differential [[id number]]  (Abnormal) Collected:  02/14/18 2042    Specimen:  Blood Updated:  02/14/18 2104     WBC 7.38 10*3/mm3      RBC 4.80 10*6/mm3      Hemoglobin 13.6 (L) g/dL      Hematocrit 39.8 %      MCV 82.9 fL      MCH 28.3 pg      MCHC 34.2 g/dL      RDW 14.4 %      RDW-SD 43.7 fl      MPV 8.5 fL      Platelets 187 10*3/mm3      Neutrophil % 67.4 %      Lymphocyte % 19.4 %      Monocyte % 8.3 %      Eosinophil % 3.9 %      Basophil % 0.1 %      Immature Grans % 0.9 (H) %      Neutrophils, Absolute 4.97 10*3/mm3      Lymphocytes, Absolute 1.43 10*3/mm3      Monocytes, Absolute 0.61 10*3/mm3      Eosinophils, Absolute 0.29 10*3/mm3      Basophils, Absolute 0.01 10*3/mm3      Immature Grans, Absolute 0.07 (H) 10*3/mm3     Magnesium [[id number]]  (Normal) Collected:  02/14/18 2042    Specimen:  Blood Updated:  02/14/18 2107     Magnesium 1.7 mg/dL     Comprehensive Metabolic Panel [[id number]]   (Abnormal) Collected:  02/14/18 2042    Specimen:  Blood Updated:  02/14/18 2109     Glucose 294 (H) mg/dL      BUN 13 mg/dL      Creatinine 0.79 mg/dL      Sodium 137 mmol/L      Potassium 4.2 mmol/L      Chloride 95 mmol/L      CO2 29.0 mmol/L      Calcium 9.2 mg/dL      Total Protein 7.8 g/dL      Albumin 4.10 g/dL      ALT (SGPT) 30 U/L      AST (SGOT) 24 U/L      Alkaline Phosphatase 79 U/L      Total Bilirubin 0.3 mg/dL      eGFR Non African Amer 109 mL/min/1.73      Globulin 3.7 (H) gm/dL      A/G Ratio 1.1 g/dL      BUN/Creatinine Ratio 16.5     Anion Gap 13.0 mmol/L     Protime-INR [[id number]]  (Normal) Collected:  02/14/18 2042    Specimen:  Blood Updated:  02/14/18 2112     Protime 12.6 Seconds      INR 0.95    Narrative:       Therapeutic range for most indications is 2.0-3.0 INR,  or 2.5-3.5 for mechanical heart valves.    aPTT [[id number]]  (Normal) Collected:  02/14/18 2042    Specimen:  Blood Updated:  02/14/18 2112     PTT 34.3 seconds     Narrative:       The recommended Heparin therapeutic range is 68-97 seconds.    Troponin [[id number]]  (Normal) Collected:  02/14/18 2042    Specimen:  Blood Updated:  02/14/18 2119     Troponin I <0.012 ng/mL     BNP [[id number]]  (Normal) Collected:  02/14/18 2042    Specimen:  Blood Updated:  02/14/18 2121     proBNP 30.2 pg/mL     Extra Tubes [[id number]] Collected:  02/14/18 2042    Specimen:  Blood from Blood, Venous Line Updated:  02/14/18 2146    Narrative:       The following orders were created for panel order Extra Tubes.  Procedure                               Abnormality         Status                     ---------                               -----------         ------                     Gold Top - SST[[id number]]                                   Final result                 Please view results for these tests on the individual orders.    Gold Top - SST [[id number]] Collected:  02/14/18 2042    Specimen:  Blood Updated:  02/14/18 2146     Extra Tube  Hold for add-ons.      Auto resulted.       POC Glucose Once [[id number]]  (Abnormal) Collected:  02/15/18 0003    Specimen:  Blood Updated:  02/15/18 0014     Glucose 285 (H) mg/dL       RN NotifiedMeter: [id number]: [id number] [facility]       CK [[id number]]  (Abnormal) Collected:  02/14/18 2042    Specimen:  Blood Updated:  02/15/18 0017     Creatine Kinase 210 (H) U/L     CK-MB [[id number]]  (Normal) Collected:  02/14/18 2042    Specimen:  Blood Updated:  02/15/18 0027     CKMB 2.01 ng/mL     Troponin [[id number]]  (Normal) Collected:  02/15/18 0017    Specimen:  Blood Updated:  02/15/18 0104     Troponin I <0.012 ng/mL     CBC Auto Differential [[id number]]  (Abnormal) Collected:  02/15/18 0643    Specimen:  Blood Updated:  02/15/18 0658     WBC 6.92 10*3/mm3      RBC 4.66 10*6/mm3      Hemoglobin 12.9 (L) g/dL      Hematocrit 37.9 (L) %      MCV 81.3 fL      MCH 27.7 pg      MCHC 34.0 g/dL      RDW 14.5 %      RDW-SD 42.8 fl      MPV 8.9 fL      Platelets 150 10*3/mm3      Neutrophil % 71.1 %      Lymphocyte % 17.5 %      Monocyte % 7.1 %      Eosinophil % 3.3 %      Basophil % 0.1 %      Immature Grans % 0.9 (H) %      Neutrophils, Absolute 4.92 10*3/mm3      Lymphocytes, Absolute 1.21 10*3/mm3      Monocytes, Absolute 0.49 10*3/mm3      Eosinophils, Absolute 0.23 10*3/mm3      Basophils, Absolute 0.01 10*3/mm3      Immature Grans, Absolute 0.06 (H) 10*3/mm3     POC Glucose Once [[id number]]  (Abnormal) Collected:  02/15/18 0632    Specimen:  Blood Updated:  02/15/18 0708     Glucose 225 (H) mg/dL       RN NotifiedMeter: [id number]: [id number] [facility]       Comprehensive Metabolic Panel [[id number]]  (Abnormal) Collected:  02/15/18 0643    Specimen:  Blood Updated:  02/15/18 0727     Glucose 231 (H) mg/dL      BUN 13 mg/dL      Creatinine 0.60 (L) mg/dL      Sodium 137 mmol/L      Potassium 4.1 mmol/L      Chloride 98 mmol/L      CO2 25.0 mmol/L      Calcium 9.0 mg/dL      Total  Protein 7.3 g/dL      Albumin 3.80 g/dL      ALT (SGPT) 69 U/L      AST (SGOT) 149 (H) U/L      Alkaline Phosphatase 82 U/L      Total Bilirubin 0.7 mg/dL      eGFR Non African Amer 150 mL/min/1.73      Globulin 3.5 gm/dL      A/G Ratio 1.1 g/dL      BUN/Creatinine Ratio 21.7     Anion Gap 14.0 mmol/L     Troponin [[id number]]  (Normal) Collected:  02/15/18 0643    Specimen:  Blood Updated:  02/15/18 0728     Troponin I <0.012 ng/mL         Imaging Results (last 24 hours)     Procedure Component Value Units Date/Time    CT Head Without Contrast [[id number]] Collected:  02/14/18 2130     Updated:  02/14/18 2249    Narrative:         CT head without contrast on  2/14/2018    CLINICAL INDICATION: Headache, hypertensive urgency    TECHNIQUE: Multiple axial images are obtained throughout the head  without the administration of contrast. This study was performed  with techniques to keep radiation doses as low as reasonably  achievable, (ALARA).   Total DLP is 971.1 mGy*cm.    COMPARISON: 2/9/2018    FINDINGS:   There is no hydrocephalus. There is no CT evidence of  acute infarct. There is no hemorrhage. There are no abnormal  extra-axial fluid collections. There is no mass, mass effect or  midline shift. No bony abnormality is noted.      Impression:       No acute intracranial abnormality.    Electronically signed by:  Pelon Anderson  2/14/2018 10:48 PM  CST Workstation: [id number]    CT Angiogram Chest With Contrast [[id number]] Collected:  02/14/18 2137     Updated:  02/14/18 2255    Narrative:         CT angiogram chest with contrast on 2/14/2018     CLINICAL INDICATION: Shortness of breath, history of pulmonary  embolus    TECHNIQUE: Multiple axial images are obtained throughout the  chest following the administration of IV contrast.  Computer  generated 3D reconstructions/MIPS were performed. This study was  performed with techniques to keep radiation doses as low as  reasonably achievable, (ALARA).   Total DLP  is 1211.7 mGy*cm.    COMPARISON: 1/2/2018     FINDINGS: Bolus timing is significantly suboptimal for evaluation  of pulmonary embolus. No gross large or central filling defect is  noted but cannot exclude pulmonary emboli on this exam.  Examination is also limited by streak artifact from patient's  body habitus. There is no aortic aneurysm or dissection. There is  no pleural or pericardial effusion. Limited visualized upper  abdomen is unremarkable. There is no thoracic adenopathy. The  lungs are clear. Couple of old left rib fractures are noted. No  acute bony abnormality is noted.      Impression:       1. Bolus timing is markedly suboptimal for evaluation of  pulmonary embolus with no evidence of a gross large or central  pulmonary embolus but cannot otherwise exclude pulmonary emboli  on this exam.  2. Otherwise essentially unremarkable.    Electronically signed by:  Pelon Anderson  2/14/2018 10:54 PM  CST Workstation: [id number]    US Venous Doppler Lower Extremity Bilateral (duplex) [[id number]] Collected:  02/15/18 0821     Updated:  02/15/18 0945    Narrative:       Procedure: Bilateral lower extremity venous ultrasound      CLINICAL INDICATION: swelling, R07.9 Chest pain, unspecified  I16.0 Hypertensive urgency      COMPARISON: None    FINDINGS:    The common femoral,  femoral,  popliteal and calf veins of the  bilateral  lower extremity are well identified and compress  normally.  Doppler signals are heard either spontaneously or on  distal compression.  No evidence of intraluminal thrombus was  noted.         Impression:       CONCLUSION:  No evidence of deep venous thrombosis in the common  femoral, femoral, popliteal and calf veins of the bilateral   lower extremity.    Electronically signed by:  Alvaro Ojeda MD  2/15/2018 9:44 AM CST  Workstation: [id number]           I reviewed the patient's new clinical results.    Assessment/Plan:     Active Hospital Problems (** Indicates Principal Problem)     Diagnosis Date Noted   • Coronary artery disease involving native coronary artery of native heart [I25.10] 12/21/2017     -Continue antiplatelet therapy: Plavix  -Continue antihypertensive therapy: Metoprolol, Imdur, Norvasc  -Monitoring cardiac activity on telemetry  -Monitoring blood pressures per hospital protocol     • Type 2 diabetes mellitus with hyperglycemia, without long-term current use of insulin [E11.65] 12/21/2017     -will continue home insulin regimen  -will add sliding scale insulin   -holding metformin      • 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/30/2017   • Chronic pulmonary embolism [I27.82] 04/22/2017   • Chronic back pain [M54.9, G89.29]      -continue home pain medication      • Essential hypertension [I10]      -Continue antihypertensive therapy  -Monitoring her blood pressure per hospital protocol     • GERD (gastroesophageal reflux disease) [K21.9]    • RLS (restless legs syndrome) [G25.81]    • Chest pain [R07.9] 03/25/2017   • Mixed hyperlipidemia [E78.2] 12/13/2016     -Switch patient's statin therapy from pravastatin to atorvastatin; will need prescription upon discharge     • Class 3 obesity due to excess calories with serious comorbidity and body mass index (BMI) of 60.0 to 69.9 in adult [E66.09, Z68.44] 07/28/2016     -Consult to nutrition to provide information on dieting and calorie counting        Resolved Hospital Problems    Diagnosis Date Noted Date Resolved   No resolved problems to display.       DVT prophylaxis: lovenox  Code status is Full Code    Plan for disposition:home when clinically improved             This document has been electronically signed by Santosh Newman MD on February 15, 2018 10:57 AM

## 2018-02-16 VITALS
BODY MASS INDEX: 44.1 KG/M2 | HEART RATE: 73 BPM | TEMPERATURE: 97.6 F | DIASTOLIC BLOOD PRESSURE: 60 MMHG | OXYGEN SATURATION: 94 % | WEIGHT: 315 LBS | HEIGHT: 71 IN | SYSTOLIC BLOOD PRESSURE: 126 MMHG | RESPIRATION RATE: 18 BRPM

## 2018-02-16 LAB
GLUCOSE BLDC GLUCOMTR-MCNC: 171 MG/DL (ref 70–130)
GLUCOSE BLDC GLUCOMTR-MCNC: 291 MG/DL (ref 70–130)

## 2018-02-16 PROCEDURE — 82962 GLUCOSE BLOOD TEST: CPT

## 2018-02-16 PROCEDURE — 94760 N-INVAS EAR/PLS OXIMETRY 1: CPT

## 2018-02-16 PROCEDURE — 63710000001 INSULIN ASPART PER 5 UNITS: Performed by: FAMILY MEDICINE

## 2018-02-16 PROCEDURE — 94799 UNLISTED PULMONARY SVC/PX: CPT

## 2018-02-16 PROCEDURE — G0378 HOSPITAL OBSERVATION PER HR: HCPCS

## 2018-02-16 RX ADMIN — INSULIN ASPART 2 UNITS: 100 INJECTION, SOLUTION INTRAVENOUS; SUBCUTANEOUS at 09:19

## 2018-02-16 RX ADMIN — ASPIRIN 81 MG: 81 TABLET, COATED ORAL at 09:17

## 2018-02-16 RX ADMIN — CLOPIDOGREL BISULFATE 75 MG: 75 TABLET ORAL at 09:17

## 2018-02-16 RX ADMIN — METOPROLOL SUCCINATE 200 MG: 100 TABLET, EXTENDED RELEASE ORAL at 09:18

## 2018-02-16 RX ADMIN — LINAGLIPTIN 5 MG: 5 TABLET, FILM COATED ORAL at 09:17

## 2018-02-16 RX ADMIN — LISINOPRIL 40 MG: 40 TABLET ORAL at 06:26

## 2018-02-16 RX ADMIN — ISOSORBIDE MONONITRATE 120 MG: 60 TABLET, EXTENDED RELEASE ORAL at 09:17

## 2018-02-16 RX ADMIN — INSULIN ASPART 4 UNITS: 100 INJECTION, SOLUTION INTRAVENOUS; SUBCUTANEOUS at 11:47

## 2018-02-16 RX ADMIN — INSULIN ASPART 15 UNITS: 100 INJECTION, SOLUTION INTRAVENOUS; SUBCUTANEOUS at 11:47

## 2018-02-16 RX ADMIN — ATORVASTATIN CALCIUM 10 MG: 10 TABLET, FILM COATED ORAL at 09:18

## 2018-02-16 RX ADMIN — ALBUTEROL SULFATE 1.25 MG: 2.5 SOLUTION RESPIRATORY (INHALATION) at 07:06

## 2018-02-16 RX ADMIN — INSULIN ASPART 15 UNITS: 100 INJECTION, SOLUTION INTRAVENOUS; SUBCUTANEOUS at 09:18

## 2018-02-16 RX ADMIN — RANOLAZINE 1000 MG: 500 TABLET, FILM COATED, EXTENDED RELEASE ORAL at 09:18

## 2018-02-16 RX ADMIN — SERTRALINE HYDROCHLORIDE 25 MG: 25 TABLET ORAL at 09:17

## 2018-02-16 NOTE — DISCHARGE SUMMARY
Date of Discharge:  2/16/2018    Discharge Diagnosis:   Hypertensive urgency [I16.0]  Chest pain, unspecified type [R07.9]     Presenting Problem/History of Present Illness  Hypertensive urgency [I16.0]  Chest pain, unspecified type [R07.9]     Hospital Course  Patient is 37 y.o. male presents with Past medical history of chronic back pain, chronic pulmonary embolism, class III obesity, coronary artery disease, essential hypertension, factor II deficiency, GERD, mixed hyperlipidemia, restless leg syndrome, presented to the ED with a complaint of having shortness of breath which started this morning, with some chest pain.  Patient state that it felt like smothering chest pain, around 6 out of 10, continued to get worse as the day went along.  Patient state that he has not missed his blood thinner.  In the ED patient was found to be hypertensive with systolic blood pressure over 200 and dyspneic.  CTA was performed however the timing of contrast and actual performing a CT was  Not optimal, therefore could not rule out peripheral PE however it did not show any central or massive pulmonary embolism.  Patient is found to have left lower extremity slightly larger than the right, with mild tenderness.     Patient was admitted overnight for shortness of breath.  Patient received 1 dose of Lovenox.  However patient's condition has been stabilized.  Patient is morbidly obese and has chronic shortness of air.  Patient was able to ambulate in pedroza without difficulty.  Patient's currently stable, able and extremely eager to be discharged.  Patient was restarted on patient's Zaroxolyn.  Patient tolerated the medication well.  Patient's Currently Being Discharged to Be Followed up As an Outpatient.      Procedures Performed         Consults:   Consults     Date and Time Order Name Status Description    2/15/2018 0743 Inpatient Consult to Cardiothoracic Surgery Completed           Pertinent Test Results:   Lab Results (last 24  hours)     Procedure Component Value Units Date/Time    Troponin [[id number]]  (Normal) Collected:  02/15/18 1202    Specimen:  Blood Updated:  02/15/18 1251     Troponin I <0.012 ng/mL     POC Glucose Once [[id number]]  (Abnormal) Collected:  02/15/18 1640    Specimen:  Blood Updated:  02/15/18 1653     Glucose 185 (H) mg/dL       RN NotifiedMeter: [id number]: [id number] TI SHENTLIN       POC Glucose Once [[id number]]  (Abnormal) Collected:  02/15/18 2111    Specimen:  Blood Updated:  02/15/18 2202     Glucose 161 (H) mg/dL       RN NotifiedMeter: [id number]: [id number] CHESNEL LULI       POC Glucose Once [[id number]]  (Abnormal) Collected:  02/16/18 0654    Specimen:  Blood Updated:  02/16/18 0720     Glucose 171 (H) mg/dL       RN NotifiedMeter: [id number]: [id number] CHESNEL LULI       POC Glucose Once [[id number]]  (Abnormal) Collected:  02/16/18 1019    Specimen:  Blood Updated:  02/16/18 1042     Glucose 291 (H) mg/dL       RN NotifiedMeter: [id number]: [id number] PATTON NATAN               Condition on Discharge:  stable  Vital Signs  Temp:  [97.3 °F (36.3 °C)-98.1 °F (36.7 °C)] 98.1 °F (36.7 °C)  Heart Rate:  [70-80] 80  Resp:  [18-22] 18  BP: (103-146)/(51-62) 146/62    Physical Exam:   Physical Exam   Constitutional: He is oriented to person, place, and time. He appears well-developed and well-nourished.   HENT:   Head: Normocephalic and atraumatic.   Nose: Nose normal.   Eyes: Conjunctivae and EOM are normal. Pupils are equal, round, and reactive to light.   Neck: Normal range of motion. Neck supple. No JVD present. No tracheal deviation present. No thyromegaly present.   Cardiovascular: Normal rate, regular rhythm, normal heart sounds and intact distal pulses.    Pulmonary/Chest: Effort normal. No respiratory distress. He has no wheezes. He has rales. He exhibits no tenderness.   Abdominal: Soft. Bowel sounds are normal. He exhibits no distension. There is no  tenderness. There is no rebound and no guarding.   Musculoskeletal: Normal range of motion. He exhibits edema.   Lymphadenopathy:     He has no cervical adenopathy.   Neurological: He is alert and oriented to person, place, and time. He has normal reflexes. No cranial nerve deficit.   Skin: Skin is warm and dry.   Intact   Psychiatric: He has a normal mood and affect.   Nursing note and vitals reviewed.      LABS Reviewed Prior to Discharge:    Results from last 7 days  Lab Units 02/15/18  0643 02/14/18  2042 02/14/18  2022 02/09/18  2000   SODIUM mmol/L 137 137  --  139   SODIUM, ARTERIAL mmol/L  --   --  133.2*  --    POTASSIUM mmol/L 4.1 4.2  --  3.9   CHLORIDE mmol/L 98 95  --  101   CO2 mmol/L 25.0 29.0  --  25.0   BUN mg/dL 13 13  --  12   CREATININE mg/dL 0.60* 0.79  --  0.67*   GLUCOSE mg/dL 231* 294*  --  159*   GLUCOSE, ARTERIAL mmol/L  --   --  295  --    CALCIUM mg/dL 9.0 9.2  --  9.2   BILIRUBIN mg/dL 0.7 0.3  --  0.2   ALK PHOS U/L 82 79  --  66   ALT (SGPT) U/L 69 30  --  36   AST (SGOT) U/L 149* 24  --  27         Results from last 7 days  Lab Units 02/14/18 2042   MAGNESIUM mg/dL 1.7         Results from last 7 days  Lab Units 02/15/18  0643 02/14/18 2042 02/09/18 2000   WBC 10*3/mm3 6.92 7.38 7.49   HEMOGLOBIN g/dL 12.9* 13.6* 13.0*   HEMATOCRIT % 37.9* 39.8 38.3*   PLATELETS 10*3/mm3 150 187 183         Results from last 7 days  Lab Units 02/14/18 2042 02/09/18 2000   INR  0.95 0.96       Discharge Disposition  Home or Self Care    Discharge Medications   Yordy Hansen   Home Medication Instructions ROCKY:[id number]    Printed on:02/16/18 1143   Medication Information                      albuterol (ACCUNEB) 1.25 MG/3ML nebulizer solution  Take 3 mL by nebulization Every 6 (Six) Hours As Needed for Wheezing.             albuterol (PROVENTIL HFA;VENTOLIN HFA) 108 (90 BASE) MCG/ACT inhaler  Inhale 2 puffs Every 4 (Four) Hours As Needed for Wheezing.             amLODIPine (NORVASC) 10 MG  tablet  Take 1 tablet by mouth Every Night.             aspirin 81 MG EC tablet  Take 1 tablet by mouth Daily.             clopidogrel (PLAVIX) 75 MG tablet  Take 1 tablet by mouth Daily.             glucose blood test strip  One touch Verio strips 4x daily test PRN for blood sugar             glucose monitor monitoring kit  1 each As Needed (check blood glucose 3x daily).             HYDROcodone-acetaminophen (NORCO)  MG per tablet  Take 1 tablet by mouth Every 6 (Six) Hours As Needed for Moderate Pain .             insulin aspart (novoLOG) 100 UNIT/ML injection  Inject 15 Units under the skin 3 (Three) Times a Day Before Meals.             insulin detemir (LEVEMIR) 100 UNIT/ML injection  Inject 35 Units under the skin Every Night.             isosorbide mononitrate (IMDUR) 120 MG 24 hr tablet  TAKE ONE TABLET BY MOUTH ONCE DAILY             linagliptin (TRADJENTA) 5 MG tablet tablet  Take 1 tablet by mouth Daily.             lisinopril (PRINIVIL,ZESTRIL) 40 MG tablet  Take 1 tablet by mouth Every Morning.             meclizine (ANTIVERT) 25 MG tablet  Take 1 tablet by mouth 3 (Three) Times a Day As Needed for dizziness.             metFORMIN (GLUCOPHAGE) 1000 MG tablet  Take 1 tablet by mouth 2 (Two) Times a Day With Meals.             metoprolol succinate XL (TOPROL XL) 200 MG 24 hr tablet  Take 1 tablet by mouth Daily.             MICROLET LANCETS misc  One touch delica lancets             nitroglycerin (NITROSTAT) 0.4 MG SL tablet  Place 1 tablet under the tongue Every 5 (Five) Minutes As Needed for Chest Pain. Take no more than 3 doses in 15 minutes.             ondansetron ODT (ZOFRAN-ODT) 4 MG disintegrating tablet  Take 1 tablet by mouth Every 6 (Six) Hours As Needed for Nausea or Vomiting.             pantoprazole (PROTONIX) 40 MG EC tablet  Take 1 tablet by mouth Every Night.             pramipexole (MIRAPEX) 1 MG tablet  Take 2 tablets by mouth Every Night. Taking 2mg daily             pravastatin  "(PRAVACHOL) 80 MG tablet  Take 1 tablet by mouth Every Night.             ranolazine (RANEXA) 1000 MG 12 hr tablet  Take 1 tablet by mouth Every 12 (Twelve) Hours.             RELION INSULIN SYRINGE 31G X 15/64\" 0.5 ML misc               rivaroxaban (XARELTO) 20 MG tablet  Take 1 tablet by mouth Daily.             sertraline (ZOLOFT) 25 MG tablet  Take 1 tablet by mouth Daily.             traZODone (DESYREL) 300 MG tablet  Take 1 tablet by mouth Every Night.                 Discharge Diet:   Diet Instructions     Diet: Regular, Consistent Carbohydrate, Cardiac       Discharge Diet:   Regular  Consistent Carbohydrate  Cardiac                    Activity at Discharge:   Activity Instructions     Activity as Tolerated                     Follow-up Appointments  Future Appointments  Date Time Provider Department Center   2/20/2018 1:20 PM [place] [facility] [place]   3/8/2018 8:00 AM [facility] ECHO ROOM 1 [facility] CARD [place]   3/8/2018 9:30 AM Parul Currie MD [facility] PULM MAD None   3/8/2018 11:00 AM [facility]   3/13/2018 2:00 PM Yordy Montelongo MD [facility] SM MAD None   5/9/2018 3:00 PM Antony Jenkins MD PhD [facility] CD MAD None         Test Results Pending at Discharge           This document has been electronically signed by Robert Justice MD on February 16, 2018 11:43 AM        Time: Discharge 33 min        EMR Dragon/Transcription disclaimer:   Dictated utilizing Dragon dictation.         "

## 2018-02-16 NOTE — DISCHARGE INSTRUCTIONS
Discharge the patient to home    Diet- Heart Healthy Diet  Activity - Ad.krzysztof with fall precautions.  Medications- See the med rec done at the time of discharge.    Follow up with  1. PCP in 2-3 days for post discharge follow up       Patient was asked to call or return if the temperatures greater than 100.4, if there is worsening nausea, vomiting, chest pain, shortness of breath, abdominal pain or any other concerns.

## 2018-02-16 NOTE — PLAN OF CARE
Problem: Patient Care Overview (Adult)  Goal: Plan of Care Review  Outcome: Ongoing (interventions implemented as appropriate)    Goal: Adult Individualization and Mutuality  Outcome: Ongoing (interventions implemented as appropriate)    Goal: Discharge Needs Assessment  Outcome: Ongoing (interventions implemented as appropriate)      Problem: Acute Coronary Syndrome (ACS) (Adult)  Goal: Signs and Symptoms of Listed Potential Problems Will be Absent or Manageable (Acute Coronary Syndrome)  Outcome: Ongoing (interventions implemented as appropriate)      Problem: Pain, Acute (Adult)  Goal: Identify Related Risk Factors and Signs and Symptoms  Outcome: Outcome(s) achieved Date Met: 02/16/18    Goal: Acceptable Pain Control/Comfort Level  Outcome: Ongoing (interventions implemented as appropriate)

## 2018-02-26 ENCOUNTER — HOSPITAL ENCOUNTER (OUTPATIENT)
Facility: HOSPITAL | Age: 38
Setting detail: OBSERVATION
Discharge: HOME | End: 2018-02-27
Attending: EMERGENCY MEDICINE | Admitting: FAMILY MEDICINE

## 2018-02-26 DIAGNOSIS — R07.9 CHEST PAIN, UNSPECIFIED TYPE: Primary | ICD-10-CM

## 2018-02-26 PROCEDURE — 99284 EMERGENCY DEPT VISIT MOD MDM: CPT

## 2018-02-26 PROCEDURE — G0378 HOSPITAL OBSERVATION PER HR: HCPCS

## 2018-02-26 PROCEDURE — 93005 ELECTROCARDIOGRAM TRACING: CPT

## 2018-02-26 PROCEDURE — 96375 TX/PRO/DX INJ NEW DRUG ADDON: CPT

## 2018-02-26 PROCEDURE — 93010 ELECTROCARDIOGRAM REPORT: CPT | Performed by: INTERNAL MEDICINE

## 2018-02-26 PROCEDURE — 96374 THER/PROPH/DIAG INJ IV PUSH: CPT

## 2018-02-27 ENCOUNTER — APPOINTMENT (OUTPATIENT)
Dept: GENERAL RADIOLOGY | Facility: HOSPITAL | Age: 38
End: 2018-02-27

## 2018-02-27 VITALS
HEIGHT: 71 IN | RESPIRATION RATE: 18 BRPM | HEART RATE: 66 BPM | OXYGEN SATURATION: 99 % | TEMPERATURE: 97.8 F | SYSTOLIC BLOOD PRESSURE: 173 MMHG | DIASTOLIC BLOOD PRESSURE: 88 MMHG | BODY MASS INDEX: 44.1 KG/M2 | WEIGHT: 315 LBS

## 2018-02-27 LAB
ALBUMIN SERPL-MCNC: 3.6 G/DL (ref 3.4–4.8)
ALBUMIN/GLOB SERPL: 1 G/DL (ref 1.1–1.8)
ALP SERPL-CCNC: 76 U/L (ref 38–126)
ALT SERPL W P-5'-P-CCNC: 42 U/L (ref 21–72)
ANION GAP SERPL CALCULATED.3IONS-SCNC: 12 MMOL/L (ref 5–15)
ANION GAP SERPL CALCULATED.3IONS-SCNC: 13 MMOL/L (ref 5–15)
AST SERPL-CCNC: 35 U/L (ref 17–59)
BASOPHILS # BLD AUTO: 0.01 10*3/MM3 (ref 0–0.2)
BASOPHILS # BLD AUTO: 0.02 10*3/MM3 (ref 0–0.2)
BASOPHILS NFR BLD AUTO: 0.1 % (ref 0–2)
BASOPHILS NFR BLD AUTO: 0.3 % (ref 0–2)
BILIRUB SERPL-MCNC: 0.2 MG/DL (ref 0.2–1.3)
BUN BLD-MCNC: 12 MG/DL (ref 7–21)
BUN BLD-MCNC: 13 MG/DL (ref 7–21)
BUN/CREAT SERPL: 19 (ref 7–25)
BUN/CREAT SERPL: 19.1 (ref 7–25)
CALCIUM SPEC-SCNC: 8.8 MG/DL (ref 8.4–10.2)
CALCIUM SPEC-SCNC: 8.8 MG/DL (ref 8.4–10.2)
CHLORIDE SERPL-SCNC: 100 MMOL/L (ref 95–110)
CHLORIDE SERPL-SCNC: 99 MMOL/L (ref 95–110)
CO2 SERPL-SCNC: 26 MMOL/L (ref 22–31)
CO2 SERPL-SCNC: 28 MMOL/L (ref 22–31)
CREAT BLD-MCNC: 0.63 MG/DL (ref 0.7–1.3)
CREAT BLD-MCNC: 0.68 MG/DL (ref 0.7–1.3)
DEPRECATED RDW RBC AUTO: 41.8 FL (ref 35.1–43.9)
DEPRECATED RDW RBC AUTO: 42 FL (ref 35.1–43.9)
EOSINOPHIL # BLD AUTO: 0.24 10*3/MM3 (ref 0–0.7)
EOSINOPHIL # BLD AUTO: 0.31 10*3/MM3 (ref 0–0.7)
EOSINOPHIL NFR BLD AUTO: 3.6 % (ref 0–7)
EOSINOPHIL NFR BLD AUTO: 3.9 % (ref 0–7)
ERYTHROCYTE [DISTWIDTH] IN BLOOD BY AUTOMATED COUNT: 14 % (ref 11.5–14.5)
ERYTHROCYTE [DISTWIDTH] IN BLOOD BY AUTOMATED COUNT: 14.1 % (ref 11.5–14.5)
GFR SERPL CREATININE-BSD FRML MDRD: 130 ML/MIN/1.73 (ref 70–162)
GFR SERPL CREATININE-BSD FRML MDRD: 142 ML/MIN/1.73 (ref 70–162)
GLOBULIN UR ELPH-MCNC: 3.5 GM/DL (ref 2.3–3.5)
GLUCOSE BLD-MCNC: 228 MG/DL (ref 60–100)
GLUCOSE BLD-MCNC: 291 MG/DL (ref 60–100)
HCT VFR BLD AUTO: 37.6 % (ref 39–49)
HCT VFR BLD AUTO: 38.8 % (ref 39–49)
HGB BLD-MCNC: 12.6 G/DL (ref 13.7–17.3)
HGB BLD-MCNC: 13 G/DL (ref 13.7–17.3)
HOLD SPECIMEN: NORMAL
HOLD SPECIMEN: NORMAL
IMM GRANULOCYTES # BLD: 0.14 10*3/MM3 (ref 0–0.02)
IMM GRANULOCYTES # BLD: 0.15 10*3/MM3 (ref 0–0.02)
IMM GRANULOCYTES NFR BLD: 1.6 % (ref 0–0.5)
IMM GRANULOCYTES NFR BLD: 2.4 % (ref 0–0.5)
LYMPHOCYTES # BLD AUTO: 1.28 10*3/MM3 (ref 0.6–4.2)
LYMPHOCYTES # BLD AUTO: 1.59 10*3/MM3 (ref 0.6–4.2)
LYMPHOCYTES NFR BLD AUTO: 18.2 % (ref 10–50)
LYMPHOCYTES NFR BLD AUTO: 20.5 % (ref 10–50)
MCH RBC QN AUTO: 27.3 PG (ref 26.5–34)
MCH RBC QN AUTO: 27.6 PG (ref 26.5–34)
MCHC RBC AUTO-ENTMCNC: 33.5 G/DL (ref 31.5–36.3)
MCHC RBC AUTO-ENTMCNC: 33.5 G/DL (ref 31.5–36.3)
MCV RBC AUTO: 81.5 FL (ref 80–98)
MCV RBC AUTO: 82.3 FL (ref 80–98)
MONOCYTES # BLD AUTO: 0.53 10*3/MM3 (ref 0–0.9)
MONOCYTES # BLD AUTO: 0.93 10*3/MM3 (ref 0–0.9)
MONOCYTES NFR BLD AUTO: 10.7 % (ref 0–12)
MONOCYTES NFR BLD AUTO: 8.5 % (ref 0–12)
NEUTROPHILS # BLD AUTO: 4.01 10*3/MM3 (ref 2–8.6)
NEUTROPHILS # BLD AUTO: 5.75 10*3/MM3 (ref 2–8.6)
NEUTROPHILS NFR BLD AUTO: 64.4 % (ref 37–80)
NEUTROPHILS NFR BLD AUTO: 65.8 % (ref 37–80)
PLATELET # BLD AUTO: 147 10*3/MM3 (ref 150–450)
PLATELET # BLD AUTO: 171 10*3/MM3 (ref 150–450)
PMV BLD AUTO: 8.5 FL (ref 8–12)
PMV BLD AUTO: 8.7 FL (ref 8–12)
POTASSIUM BLD-SCNC: 3.9 MMOL/L (ref 3.5–5.1)
POTASSIUM BLD-SCNC: 3.9 MMOL/L (ref 3.5–5.1)
PROT SERPL-MCNC: 7.1 G/DL (ref 6.3–8.6)
RBC # BLD AUTO: 4.57 10*6/MM3 (ref 4.37–5.74)
RBC # BLD AUTO: 4.76 10*6/MM3 (ref 4.37–5.74)
SODIUM BLD-SCNC: 138 MMOL/L (ref 137–145)
SODIUM BLD-SCNC: 140 MMOL/L (ref 137–145)
TROPONIN I SERPL-MCNC: 0.01 NG/ML
TROPONIN I SERPL-MCNC: <0.012 NG/ML
TROPONIN I SERPL-MCNC: <0.012 NG/ML
WBC NRBC COR # BLD: 6.23 10*3/MM3 (ref 3.2–9.8)
WBC NRBC COR # BLD: 8.73 10*3/MM3 (ref 3.2–9.8)
WHOLE BLOOD HOLD SPECIMEN: NORMAL
WHOLE BLOOD HOLD SPECIMEN: NORMAL

## 2018-02-27 PROCEDURE — 84484 ASSAY OF TROPONIN QUANT: CPT | Performed by: EMERGENCY MEDICINE

## 2018-02-27 PROCEDURE — 85025 COMPLETE CBC W/AUTO DIFF WBC: CPT | Performed by: STUDENT IN AN ORGANIZED HEALTH CARE EDUCATION/TRAINING PROGRAM

## 2018-02-27 PROCEDURE — 80053 COMPREHEN METABOLIC PANEL: CPT | Performed by: STUDENT IN AN ORGANIZED HEALTH CARE EDUCATION/TRAINING PROGRAM

## 2018-02-27 PROCEDURE — 25010000002 MORPHINE PER 10 MG: Performed by: STUDENT IN AN ORGANIZED HEALTH CARE EDUCATION/TRAINING PROGRAM

## 2018-02-27 PROCEDURE — G0378 HOSPITAL OBSERVATION PER HR: HCPCS

## 2018-02-27 PROCEDURE — 25010000002 ONDANSETRON PER 1 MG: Performed by: STUDENT IN AN ORGANIZED HEALTH CARE EDUCATION/TRAINING PROGRAM

## 2018-02-27 PROCEDURE — 71046 X-RAY EXAM CHEST 2 VIEWS: CPT

## 2018-02-27 PROCEDURE — 63710000001 INSULIN ASPART PER 5 UNITS: Performed by: FAMILY MEDICINE

## 2018-02-27 PROCEDURE — 85025 COMPLETE CBC W/AUTO DIFF WBC: CPT | Performed by: FAMILY MEDICINE

## 2018-02-27 PROCEDURE — 96375 TX/PRO/DX INJ NEW DRUG ADDON: CPT

## 2018-02-27 PROCEDURE — 94760 N-INVAS EAR/PLS OXIMETRY 1: CPT

## 2018-02-27 PROCEDURE — 84484 ASSAY OF TROPONIN QUANT: CPT | Performed by: FAMILY MEDICINE

## 2018-02-27 PROCEDURE — 96374 THER/PROPH/DIAG INJ IV PUSH: CPT

## 2018-02-27 PROCEDURE — 94640 AIRWAY INHALATION TREATMENT: CPT

## 2018-02-27 PROCEDURE — 93010 ELECTROCARDIOGRAM REPORT: CPT | Performed by: INTERNAL MEDICINE

## 2018-02-27 PROCEDURE — 93005 ELECTROCARDIOGRAM TRACING: CPT | Performed by: FAMILY MEDICINE

## 2018-02-27 RX ORDER — ALBUTEROL SULFATE 2.5 MG/3ML
1.25 SOLUTION RESPIRATORY (INHALATION) EVERY 6 HOURS PRN
Status: DISCONTINUED | OUTPATIENT
Start: 2018-02-27 | End: 2018-02-27 | Stop reason: HOSPADM

## 2018-02-27 RX ORDER — HYDROCODONE BITARTRATE AND ACETAMINOPHEN 10; 325 MG/1; MG/1
1 TABLET ORAL EVERY 6 HOURS PRN
Status: DISCONTINUED | OUTPATIENT
Start: 2018-02-27 | End: 2018-02-27 | Stop reason: HOSPADM

## 2018-02-27 RX ORDER — ASPIRIN 81 MG/1
324 TABLET, CHEWABLE ORAL ONCE
Status: COMPLETED | OUTPATIENT
Start: 2018-02-27 | End: 2018-02-27

## 2018-02-27 RX ORDER — ONDANSETRON 2 MG/ML
4 INJECTION INTRAMUSCULAR; INTRAVENOUS ONCE
Status: COMPLETED | OUTPATIENT
Start: 2018-02-27 | End: 2018-02-27

## 2018-02-27 RX ORDER — LISINOPRIL 40 MG/1
40 TABLET ORAL EVERY MORNING
Status: DISCONTINUED | OUTPATIENT
Start: 2018-02-27 | End: 2018-02-27 | Stop reason: HOSPADM

## 2018-02-27 RX ORDER — CLOPIDOGREL BISULFATE 75 MG/1
75 TABLET ORAL DAILY
Status: DISCONTINUED | OUTPATIENT
Start: 2018-02-27 | End: 2018-02-27 | Stop reason: HOSPADM

## 2018-02-27 RX ORDER — METOPROLOL SUCCINATE 100 MG/1
200 TABLET, EXTENDED RELEASE ORAL DAILY
Status: DISCONTINUED | OUTPATIENT
Start: 2018-02-27 | End: 2018-02-27 | Stop reason: HOSPADM

## 2018-02-27 RX ORDER — RANOLAZINE 500 MG/1
1000 TABLET, EXTENDED RELEASE ORAL EVERY 12 HOURS SCHEDULED
Status: DISCONTINUED | OUTPATIENT
Start: 2018-02-27 | End: 2018-02-27 | Stop reason: HOSPADM

## 2018-02-27 RX ORDER — ATORVASTATIN CALCIUM 10 MG/1
10 TABLET, FILM COATED ORAL DAILY
Status: DISCONTINUED | OUTPATIENT
Start: 2018-02-27 | End: 2018-02-27 | Stop reason: HOSPADM

## 2018-02-27 RX ORDER — SODIUM CHLORIDE 0.9 % (FLUSH) 0.9 %
1-10 SYRINGE (ML) INJECTION AS NEEDED
Status: DISCONTINUED | OUTPATIENT
Start: 2018-02-27 | End: 2018-02-27 | Stop reason: HOSPADM

## 2018-02-27 RX ORDER — AMLODIPINE BESYLATE 10 MG/1
10 TABLET ORAL NIGHTLY
Status: DISCONTINUED | OUTPATIENT
Start: 2018-02-27 | End: 2018-02-27 | Stop reason: HOSPADM

## 2018-02-27 RX ORDER — ONDANSETRON 2 MG/ML
4 INJECTION INTRAMUSCULAR; INTRAVENOUS EVERY 6 HOURS PRN
Status: DISCONTINUED | OUTPATIENT
Start: 2018-02-27 | End: 2018-02-27 | Stop reason: HOSPADM

## 2018-02-27 RX ORDER — ASPIRIN 81 MG/1
81 TABLET ORAL DAILY
Status: DISCONTINUED | OUTPATIENT
Start: 2018-02-27 | End: 2018-02-27 | Stop reason: HOSPADM

## 2018-02-27 RX ORDER — NITROGLYCERIN 0.4 MG/1
0.4 TABLET SUBLINGUAL
Status: DISCONTINUED | OUTPATIENT
Start: 2018-02-27 | End: 2018-02-27 | Stop reason: HOSPADM

## 2018-02-27 RX ADMIN — CLOPIDOGREL BISULFATE 75 MG: 75 TABLET ORAL at 08:35

## 2018-02-27 RX ADMIN — RIVAROXABAN 20 MG: 10 TABLET, FILM COATED ORAL at 08:35

## 2018-02-27 RX ADMIN — INSULIN ASPART 10 UNITS: 100 INJECTION, SOLUTION INTRAVENOUS; SUBCUTANEOUS at 07:34

## 2018-02-27 RX ADMIN — RANOLAZINE 1000 MG: 500 TABLET, FILM COATED, EXTENDED RELEASE ORAL at 08:35

## 2018-02-27 RX ADMIN — NITROGLYCERIN 0.4 MG: 0.4 TABLET SUBLINGUAL at 05:54

## 2018-02-27 RX ADMIN — MORPHINE SULFATE 4 MG: 4 INJECTION, SOLUTION INTRAMUSCULAR; INTRAVENOUS at 00:34

## 2018-02-27 RX ADMIN — ASPIRIN 81 MG 324 MG: 81 TABLET ORAL at 00:33

## 2018-02-27 RX ADMIN — ONDANSETRON 4 MG: 2 INJECTION INTRAMUSCULAR; INTRAVENOUS at 00:34

## 2018-02-27 RX ADMIN — ATORVASTATIN CALCIUM 10 MG: 10 TABLET, FILM COATED ORAL at 08:35

## 2018-02-27 RX ADMIN — HYDROCODONE BITARTRATE AND ACETAMINOPHEN 1 TABLET: 10; 325 TABLET ORAL at 06:30

## 2018-02-27 RX ADMIN — INSULIN ASPART 10 UNITS: 100 INJECTION, SOLUTION INTRAVENOUS; SUBCUTANEOUS at 11:36

## 2018-02-27 RX ADMIN — ASPIRIN 81 MG: 81 TABLET, COATED ORAL at 08:35

## 2018-02-27 RX ADMIN — LISINOPRIL 40 MG: 40 TABLET ORAL at 08:35

## 2018-02-27 RX ADMIN — ALBUTEROL SULFATE 1.25 MG: 2.5 SOLUTION RESPIRATORY (INHALATION) at 08:48

## 2018-02-27 RX ADMIN — METOPROLOL SUCCINATE 200 MG: 100 TABLET, EXTENDED RELEASE ORAL at 08:35

## 2018-02-28 ENCOUNTER — APPOINTMENT (OUTPATIENT)
Dept: GENERAL RADIOLOGY | Facility: HOSPITAL | Age: 38
End: 2018-02-28

## 2018-02-28 ENCOUNTER — HOSPITAL ENCOUNTER (OUTPATIENT)
Facility: HOSPITAL | Age: 38
Discharge: HOME | End: 2018-03-02
Attending: FAMILY MEDICINE | Admitting: FAMILY MEDICINE

## 2018-02-28 DIAGNOSIS — R07.9 CHEST PAIN, UNSPECIFIED TYPE: Primary | ICD-10-CM

## 2018-02-28 DIAGNOSIS — R07.2 PRECORDIAL PAIN: Primary | ICD-10-CM

## 2018-02-28 DIAGNOSIS — R07.2 PRECORDIAL PAIN: ICD-10-CM

## 2018-02-28 DIAGNOSIS — R06.00 DYSPNEA, UNSPECIFIED TYPE: ICD-10-CM

## 2018-02-28 LAB
ALBUMIN SERPL-MCNC: 4 G/DL (ref 3.4–4.8)
ALBUMIN/GLOB SERPL: 1 G/DL (ref 1.1–1.8)
ALP SERPL-CCNC: 89 U/L (ref 38–126)
ALT SERPL W P-5'-P-CCNC: 60 U/L (ref 21–72)
ANION GAP SERPL CALCULATED.3IONS-SCNC: 12 MMOL/L (ref 5–15)
APTT PPP: 30.5 SECONDS (ref 20–40.3)
AST SERPL-CCNC: 28 U/L (ref 17–59)
BASOPHILS # BLD AUTO: 0.02 10*3/MM3 (ref 0–0.2)
BASOPHILS NFR BLD AUTO: 0.3 % (ref 0–2)
BILIRUB SERPL-MCNC: 0.3 MG/DL (ref 0.2–1.3)
BILIRUB UR QL STRIP: NEGATIVE
BUN BLD-MCNC: 14 MG/DL (ref 7–21)
BUN/CREAT SERPL: 18.9 (ref 7–25)
CALCIUM SPEC-SCNC: 9.2 MG/DL (ref 8.4–10.2)
CHLORIDE SERPL-SCNC: 94 MMOL/L (ref 95–110)
CK MB SERPL-CCNC: 2.18 NG/ML (ref 0–5)
CK SERPL-CCNC: 197 U/L (ref 55–170)
CLARITY UR: CLEAR
CO2 SERPL-SCNC: 28 MMOL/L (ref 22–31)
COLOR UR: YELLOW
CREAT BLD-MCNC: 0.74 MG/DL (ref 0.7–1.3)
D-DIMER, QUANTITATIVE (MAD,POW, STR): 290 NG/ML (FEU) (ref 0–470)
DEPRECATED RDW RBC AUTO: 42.1 FL (ref 35.1–43.9)
EOSINOPHIL # BLD AUTO: 0.23 10*3/MM3 (ref 0–0.7)
EOSINOPHIL NFR BLD AUTO: 3.3 % (ref 0–7)
ERYTHROCYTE [DISTWIDTH] IN BLOOD BY AUTOMATED COUNT: 14 % (ref 11.5–14.5)
GFR SERPL CREATININE-BSD FRML MDRD: 118 ML/MIN/1.73 (ref 70–162)
GLOBULIN UR ELPH-MCNC: 3.9 GM/DL (ref 2.3–3.5)
GLUCOSE BLD-MCNC: 425 MG/DL (ref 60–100)
GLUCOSE BLDC GLUCOMTR-MCNC: 312 MG/DL (ref 70–130)
GLUCOSE UR STRIP-MCNC: ABNORMAL MG/DL
HCT VFR BLD AUTO: 40.9 % (ref 39–49)
HGB BLD-MCNC: 13.5 G/DL (ref 13.7–17.3)
HGB UR QL STRIP.AUTO: NEGATIVE
HOLD SPECIMEN: NORMAL
HOLD SPECIMEN: NORMAL
IMM GRANULOCYTES # BLD: 0.12 10*3/MM3 (ref 0–0.02)
IMM GRANULOCYTES NFR BLD: 1.7 % (ref 0–0.5)
INR PPP: 0.9 (ref 0.8–1.2)
KETONES UR QL STRIP: NEGATIVE
LEUKOCYTE ESTERASE UR QL STRIP.AUTO: NEGATIVE
LYMPHOCYTES # BLD AUTO: 1.39 10*3/MM3 (ref 0.6–4.2)
LYMPHOCYTES NFR BLD AUTO: 19.9 % (ref 10–50)
MCH RBC QN AUTO: 27.4 PG (ref 26.5–34)
MCHC RBC AUTO-ENTMCNC: 33 G/DL (ref 31.5–36.3)
MCV RBC AUTO: 83 FL (ref 80–98)
MONOCYTES # BLD AUTO: 0.53 10*3/MM3 (ref 0–0.9)
MONOCYTES NFR BLD AUTO: 7.6 % (ref 0–12)
NEUTROPHILS # BLD AUTO: 4.71 10*3/MM3 (ref 2–8.6)
NEUTROPHILS NFR BLD AUTO: 67.2 % (ref 37–80)
NITRITE UR QL STRIP: NEGATIVE
NT-PROBNP SERPL-MCNC: 66.2 PG/ML (ref 0–450)
PH UR STRIP.AUTO: 6.5 [PH] (ref 5–9)
PLATELET # BLD AUTO: 186 10*3/MM3 (ref 150–450)
PMV BLD AUTO: 8.8 FL (ref 8–12)
POTASSIUM BLD-SCNC: 4.4 MMOL/L (ref 3.5–5.1)
PROT SERPL-MCNC: 7.9 G/DL (ref 6.3–8.6)
PROT UR QL STRIP: ABNORMAL
PROTHROMBIN TIME: 12 SECONDS (ref 11.1–15.3)
RBC # BLD AUTO: 4.93 10*6/MM3 (ref 4.37–5.74)
SODIUM BLD-SCNC: 134 MMOL/L (ref 137–145)
SP GR UR STRIP: 1.03 (ref 1–1.03)
TROPONIN I SERPL-MCNC: <0.012 NG/ML
TROPONIN I SERPL-MCNC: <0.012 NG/ML
UROBILINOGEN UR QL STRIP: ABNORMAL
WBC NRBC COR # BLD: 7 10*3/MM3 (ref 3.2–9.8)
WHOLE BLOOD HOLD SPECIMEN: NORMAL
WHOLE BLOOD HOLD SPECIMEN: NORMAL

## 2018-02-28 PROCEDURE — G0378 HOSPITAL OBSERVATION PER HR: HCPCS

## 2018-02-28 PROCEDURE — 81003 URINALYSIS AUTO W/O SCOPE: CPT | Performed by: EMERGENCY MEDICINE

## 2018-02-28 PROCEDURE — 25010000002 MORPHINE PER 10 MG: Performed by: STUDENT IN AN ORGANIZED HEALTH CARE EDUCATION/TRAINING PROGRAM

## 2018-02-28 PROCEDURE — 82553 CREATINE MB FRACTION: CPT | Performed by: STUDENT IN AN ORGANIZED HEALTH CARE EDUCATION/TRAINING PROGRAM

## 2018-02-28 PROCEDURE — 82550 ASSAY OF CK (CPK): CPT | Performed by: STUDENT IN AN ORGANIZED HEALTH CARE EDUCATION/TRAINING PROGRAM

## 2018-02-28 PROCEDURE — 96376 TX/PRO/DX INJ SAME DRUG ADON: CPT

## 2018-02-28 PROCEDURE — 96374 THER/PROPH/DIAG INJ IV PUSH: CPT

## 2018-02-28 PROCEDURE — 85379 FIBRIN DEGRADATION QUANT: CPT | Performed by: STUDENT IN AN ORGANIZED HEALTH CARE EDUCATION/TRAINING PROGRAM

## 2018-02-28 PROCEDURE — 71045 X-RAY EXAM CHEST 1 VIEW: CPT

## 2018-02-28 PROCEDURE — 93010 ELECTROCARDIOGRAM REPORT: CPT | Performed by: INTERNAL MEDICINE

## 2018-02-28 PROCEDURE — 99285 EMERGENCY DEPT VISIT HI MDM: CPT

## 2018-02-28 PROCEDURE — 82962 GLUCOSE BLOOD TEST: CPT

## 2018-02-28 PROCEDURE — 80053 COMPREHEN METABOLIC PANEL: CPT | Performed by: FAMILY MEDICINE

## 2018-02-28 PROCEDURE — 85730 THROMBOPLASTIN TIME PARTIAL: CPT | Performed by: STUDENT IN AN ORGANIZED HEALTH CARE EDUCATION/TRAINING PROGRAM

## 2018-02-28 PROCEDURE — 85025 COMPLETE CBC W/AUTO DIFF WBC: CPT | Performed by: FAMILY MEDICINE

## 2018-02-28 PROCEDURE — 96375 TX/PRO/DX INJ NEW DRUG ADDON: CPT

## 2018-02-28 PROCEDURE — 83880 ASSAY OF NATRIURETIC PEPTIDE: CPT | Performed by: FAMILY MEDICINE

## 2018-02-28 PROCEDURE — 84484 ASSAY OF TROPONIN QUANT: CPT | Performed by: FAMILY MEDICINE

## 2018-02-28 PROCEDURE — 85610 PROTHROMBIN TIME: CPT | Performed by: FAMILY MEDICINE

## 2018-02-28 PROCEDURE — 93005 ELECTROCARDIOGRAM TRACING: CPT

## 2018-02-28 RX ORDER — ALBUTEROL SULFATE 2.5 MG/3ML
2.5 SOLUTION RESPIRATORY (INHALATION) ONCE
Status: COMPLETED | OUTPATIENT
Start: 2018-02-28 | End: 2018-02-28

## 2018-02-28 RX ORDER — SODIUM CHLORIDE 9 MG/ML
INJECTION, SOLUTION INTRAVENOUS
Status: COMPLETED
Start: 2018-02-28 | End: 2018-02-28

## 2018-02-28 RX ORDER — ASPIRIN 325 MG
325 TABLET ORAL ONCE
Status: COMPLETED | OUTPATIENT
Start: 2018-02-28 | End: 2018-02-28

## 2018-02-28 RX ORDER — MORPHINE SULFATE 4 MG/ML
INJECTION, SOLUTION INTRAMUSCULAR; INTRAVENOUS
Status: DISCONTINUED
Start: 2018-02-28 | End: 2018-03-02 | Stop reason: HOSPADM

## 2018-02-28 RX ORDER — NITROGLYCERIN 0.4 MG/1
0.4 TABLET SUBLINGUAL
Status: DISCONTINUED | OUTPATIENT
Start: 2018-02-28 | End: 2018-03-01

## 2018-02-28 RX ORDER — SODIUM CHLORIDE 0.9 % (FLUSH) 0.9 %
10 SYRINGE (ML) INJECTION AS NEEDED
Status: DISCONTINUED | OUTPATIENT
Start: 2018-02-28 | End: 2018-03-02 | Stop reason: HOSPADM

## 2018-02-28 RX ADMIN — SODIUM CHLORIDE 1000 ML: 9 INJECTION, SOLUTION INTRAVENOUS at 21:35

## 2018-02-28 RX ADMIN — MORPHINE SULFATE 4 MG: 4 INJECTION, SOLUTION INTRAMUSCULAR; INTRAVENOUS at 20:55

## 2018-02-28 RX ADMIN — ALBUTEROL SULFATE 2.5 MG: 2.5 SOLUTION RESPIRATORY (INHALATION) at 20:28

## 2018-02-28 RX ADMIN — ASPIRIN 325 MG: 325 TABLET, COATED ORAL at 20:28

## 2018-02-28 RX ADMIN — SODIUM CHLORIDE 1000 ML: 9 INJECTION, SOLUTION INTRAVENOUS at 20:27

## 2018-02-28 RX ADMIN — MORPHINE SULFATE 4 MG: 4 INJECTION, SOLUTION INTRAMUSCULAR; INTRAVENOUS at 21:32

## 2018-02-28 NOTE — PROGRESS NOTES
Mr. Hansen has had recurrent admissions for chest pain that has turned out to have no objective evidence of ischemia.  He does have a history of pulmonary embolism.  He's currently on anticoagulation.  I discussed his case with the hospitalist to yesterday.  He was again admitted with chest pain, but ruled out for MI with serial cardiac enzymes.  Chest CTA showed no evidence of dissection or residual pulmonary embolism.  We discussed discharging the patient and if he has any further chest pain to proceed with invasive coronary angiography.  Patient contacted the office this morning to complain of intermittent and ongoing chest pain complaints.  He did not believe that he needed to be seen in the emergency department.    I recommended we proceed with invasive coronary angiography.  Of note, he is on anticoagulation.  I will arrange outpatient invasive coronary angiography.    Antony Jenkins MD PhD

## 2018-03-01 ENCOUNTER — APPOINTMENT (OUTPATIENT)
Dept: INTERVENTIONAL RADIOLOGY/VASCULAR | Facility: HOSPITAL | Age: 38
End: 2018-03-01

## 2018-03-01 ENCOUNTER — APPOINTMENT (OUTPATIENT)
Dept: ULTRASOUND IMAGING | Facility: HOSPITAL | Age: 38
End: 2018-03-01

## 2018-03-01 ENCOUNTER — APPOINTMENT (OUTPATIENT)
Dept: CARDIOLOGY | Facility: HOSPITAL | Age: 38
End: 2018-03-01
Attending: INTERNAL MEDICINE

## 2018-03-01 PROBLEM — R07.2 PRECORDIAL PAIN: Status: ACTIVE | Noted: 2018-02-28

## 2018-03-01 LAB
ANION GAP SERPL CALCULATED.3IONS-SCNC: 12 MMOL/L (ref 5–15)
APTT PPP: 20.1 SECONDS (ref 20–40.3)
APTT PPP: 20.3 SECONDS (ref 20–40.3)
APTT PPP: 32.7 SECONDS (ref 20–40.3)
BASOPHILS # BLD AUTO: 0.01 10*3/MM3 (ref 0–0.2)
BASOPHILS NFR BLD AUTO: 0.1 % (ref 0–2)
BH CV ECHO MEAS - BSA(HAYCOCK): 3.4 M^2
BH CV ECHO MEAS - BSA: 3 M^2
BH CV ECHO MEAS - BZI_BMI: 65.4 KILOGRAMS/M^2
BH CV ECHO MEAS - BZI_METRIC_HEIGHT: 180.3 CM
BH CV ECHO MEAS - BZI_METRIC_WEIGHT: 212.7 KG
BH CV ECHO MEAS - EDV(CUBED): 146.4 ML
BH CV ECHO MEAS - EDV(TEICH): 133.6 ML
BH CV ECHO MEAS - EF(CUBED): 84.5 %
BH CV ECHO MEAS - EF(TEICH): 77.3 %
BH CV ECHO MEAS - ESV(CUBED): 22.7 ML
BH CV ECHO MEAS - ESV(TEICH): 30.3 ML
BH CV ECHO MEAS - FS: 46.3 %
BH CV ECHO MEAS - IVS/LVPW: 1.3
BH CV ECHO MEAS - IVSD: 1.9 CM
BH CV ECHO MEAS - LV MASS(C)D: 413.6 GRAMS
BH CV ECHO MEAS - LV MASS(C)DI: 136.5 GRAMS/M^2
BH CV ECHO MEAS - LVIDD: 5.3 CM
BH CV ECHO MEAS - LVIDS: 2.8 CM
BH CV ECHO MEAS - LVPWD: 1.5 CM
BH CV ECHO MEAS - RVDD: 3.2 CM
BH CV ECHO MEAS - SI(CUBED): 40.8 ML/M^2
BH CV ECHO MEAS - SI(TEICH): 34.1 ML/M^2
BH CV ECHO MEAS - SV(CUBED): 123.7 ML
BH CV ECHO MEAS - SV(TEICH): 103.2 ML
BH CV ECHO MEAS - TR MAX VEL: 242 CM/SEC
BUN BLD-MCNC: 11 MG/DL (ref 7–21)
BUN/CREAT SERPL: 18.6 (ref 7–25)
CALCIUM SPEC-SCNC: 8.8 MG/DL (ref 8.4–10.2)
CHLORIDE SERPL-SCNC: 99 MMOL/L (ref 95–110)
CO2 SERPL-SCNC: 26 MMOL/L (ref 22–31)
CREAT BLD-MCNC: 0.59 MG/DL (ref 0.7–1.3)
DEPRECATED RDW RBC AUTO: 42.6 FL (ref 35.1–43.9)
EOSINOPHIL # BLD AUTO: 0.3 10*3/MM3 (ref 0–0.7)
EOSINOPHIL NFR BLD AUTO: 3.8 % (ref 0–7)
ERYTHROCYTE [DISTWIDTH] IN BLOOD BY AUTOMATED COUNT: 14.3 % (ref 11.5–14.5)
GFR SERPL CREATININE-BSD FRML MDRD: >150 ML/MIN/1.73 (ref 60–162)
GLUCOSE BLD-MCNC: 206 MG/DL (ref 60–100)
GLUCOSE BLDC GLUCOMTR-MCNC: 168 MG/DL (ref 70–130)
GLUCOSE BLDC GLUCOMTR-MCNC: 191 MG/DL (ref 70–130)
GLUCOSE BLDC GLUCOMTR-MCNC: 198 MG/DL (ref 70–130)
GLUCOSE BLDC GLUCOMTR-MCNC: 206 MG/DL (ref 70–130)
GLUCOSE BLDC GLUCOMTR-MCNC: 284 MG/DL (ref 70–130)
HCT VFR BLD AUTO: 38.1 % (ref 39–49)
HGB BLD-MCNC: 12.8 G/DL (ref 13.7–17.3)
IMM GRANULOCYTES # BLD: 0.15 10*3/MM3 (ref 0–0.02)
IMM GRANULOCYTES NFR BLD: 1.9 % (ref 0–0.5)
INR PPP: 0.92 (ref 0.8–1.2)
LYMPHOCYTES # BLD AUTO: 1.56 10*3/MM3 (ref 0.6–4.2)
LYMPHOCYTES NFR BLD AUTO: 19.5 % (ref 10–50)
MAXIMAL PREDICTED HEART RATE: 181 BPM
MCH RBC QN AUTO: 27.5 PG (ref 26.5–34)
MCHC RBC AUTO-ENTMCNC: 33.6 G/DL (ref 31.5–36.3)
MCV RBC AUTO: 81.8 FL (ref 80–98)
MONOCYTES # BLD AUTO: 0.71 10*3/MM3 (ref 0–0.9)
MONOCYTES NFR BLD AUTO: 8.9 % (ref 0–12)
NEUTROPHILS # BLD AUTO: 5.27 10*3/MM3 (ref 2–8.6)
NEUTROPHILS NFR BLD AUTO: 65.8 % (ref 37–80)
NRBC BLD MANUAL-RTO: 0 /100 WBC (ref 0–0)
PLATELET # BLD AUTO: 159 10*3/MM3 (ref 150–450)
PMV BLD AUTO: 8.7 FL (ref 8–12)
POTASSIUM BLD-SCNC: 3.7 MMOL/L (ref 3.5–5.1)
PROTHROMBIN TIME: 12.3 SECONDS (ref 11.1–15.3)
RBC # BLD AUTO: 4.66 10*6/MM3 (ref 4.37–5.74)
SODIUM BLD-SCNC: 137 MMOL/L (ref 137–145)
STRESS TARGET HR: 154 BPM
WBC NRBC COR # BLD: 8 10*3/MM3 (ref 3.2–9.8)

## 2018-03-01 PROCEDURE — 93010 ELECTROCARDIOGRAM REPORT: CPT | Performed by: INTERNAL MEDICINE

## 2018-03-01 PROCEDURE — 63710000001 INSULIN DETEMIR PER 5 UNITS: Performed by: FAMILY MEDICINE

## 2018-03-01 PROCEDURE — 80048 BASIC METABOLIC PNL TOTAL CA: CPT | Performed by: FAMILY MEDICINE

## 2018-03-01 PROCEDURE — G0378 HOSPITAL OBSERVATION PER HR: HCPCS

## 2018-03-01 PROCEDURE — 96376 TX/PRO/DX INJ SAME DRUG ADON: CPT

## 2018-03-01 PROCEDURE — 63710000001 INSULIN DETEMIR PER 5 UNITS: Performed by: INTERNAL MEDICINE

## 2018-03-01 PROCEDURE — A9270 NON-COVERED ITEM OR SERVICE: HCPCS | Performed by: INTERNAL MEDICINE

## 2018-03-01 PROCEDURE — 25010000002 HYDRALAZINE PER 20 MG: Performed by: FAMILY MEDICINE

## 2018-03-01 PROCEDURE — 85730 THROMBOPLASTIN TIME PARTIAL: CPT | Performed by: FAMILY MEDICINE

## 2018-03-01 PROCEDURE — 93005 ELECTROCARDIOGRAM TRACING: CPT | Performed by: INTERNAL MEDICINE

## 2018-03-01 PROCEDURE — 25010000002 MORPHINE PER 10 MG: Performed by: FAMILY MEDICINE

## 2018-03-01 PROCEDURE — 63710000001 INSULIN ASPART PER 5 UNITS: Performed by: FAMILY MEDICINE

## 2018-03-01 PROCEDURE — 96366 THER/PROPH/DIAG IV INF ADDON: CPT

## 2018-03-01 PROCEDURE — 76937 US GUIDE VASCULAR ACCESS: CPT

## 2018-03-01 PROCEDURE — 25010000002 ONDANSETRON PER 1 MG: Performed by: FAMILY MEDICINE

## 2018-03-01 PROCEDURE — C1751 CATH, INF, PER/CENT/MIDLINE: HCPCS

## 2018-03-01 PROCEDURE — 99226 PR SBSQ OBSERVATION CARE/DAY 35 MINUTES: CPT | Performed by: INTERNAL MEDICINE

## 2018-03-01 PROCEDURE — 0 IOPAMIDOL PER 1 ML: Performed by: INTERNAL MEDICINE

## 2018-03-01 PROCEDURE — 63710000001 RANOLAZINE 500 MG TABLET SUSTAINED-RELEASE 12 HOUR: Performed by: INTERNAL MEDICINE

## 2018-03-01 PROCEDURE — 96365 THER/PROPH/DIAG IV INF INIT: CPT

## 2018-03-01 PROCEDURE — 93325 DOPPLER ECHO COLOR FLOW MAPG: CPT

## 2018-03-01 PROCEDURE — 25010000002 HEPARIN (PORCINE) PER 1000 UNITS: Performed by: FAMILY MEDICINE

## 2018-03-01 PROCEDURE — 93454 CORONARY ARTERY ANGIO S&I: CPT | Performed by: INTERNAL MEDICINE

## 2018-03-01 PROCEDURE — 63710000001 ATORVASTATIN 40 MG TABLET: Performed by: INTERNAL MEDICINE

## 2018-03-01 PROCEDURE — C1769 GUIDE WIRE: HCPCS | Performed by: INTERNAL MEDICINE

## 2018-03-01 PROCEDURE — 82962 GLUCOSE BLOOD TEST: CPT

## 2018-03-01 PROCEDURE — 63710000001 TRAZODONE 150 MG TABLET: Performed by: INTERNAL MEDICINE

## 2018-03-01 PROCEDURE — 96375 TX/PRO/DX INJ NEW DRUG ADDON: CPT

## 2018-03-01 PROCEDURE — 25010000002 HEPARIN (PORCINE) PER 1000 UNITS: Performed by: INTERNAL MEDICINE

## 2018-03-01 PROCEDURE — 85025 COMPLETE CBC W/AUTO DIFF WBC: CPT | Performed by: FAMILY MEDICINE

## 2018-03-01 PROCEDURE — C1894 INTRO/SHEATH, NON-LASER: HCPCS | Performed by: INTERNAL MEDICINE

## 2018-03-01 PROCEDURE — 93321 DOPPLER ECHO F-UP/LMTD STD: CPT

## 2018-03-01 PROCEDURE — 63710000001 PRAMIPEXOLE 1 MG TABLET: Performed by: INTERNAL MEDICINE

## 2018-03-01 PROCEDURE — 85610 PROTHROMBIN TIME: CPT | Performed by: FAMILY MEDICINE

## 2018-03-01 PROCEDURE — 93308 TTE F-UP OR LMTD: CPT | Performed by: INTERNAL MEDICINE

## 2018-03-01 PROCEDURE — 93321 DOPPLER ECHO F-UP/LMTD STD: CPT | Performed by: INTERNAL MEDICINE

## 2018-03-01 PROCEDURE — 25010000002 MORPHINE PER 10 MG: Performed by: INTERNAL MEDICINE

## 2018-03-01 PROCEDURE — 63710000001 INSULIN ASPART PER 5 UNITS: Performed by: INTERNAL MEDICINE

## 2018-03-01 PROCEDURE — 93325 DOPPLER ECHO COLOR FLOW MAPG: CPT | Performed by: INTERNAL MEDICINE

## 2018-03-01 PROCEDURE — 93308 TTE F-UP OR LMTD: CPT

## 2018-03-01 RX ORDER — RANOLAZINE 500 MG/1
1000 TABLET, EXTENDED RELEASE ORAL EVERY 12 HOURS SCHEDULED
Status: DISCONTINUED | OUTPATIENT
Start: 2018-03-01 | End: 2018-03-02 | Stop reason: HOSPADM

## 2018-03-01 RX ORDER — ONDANSETRON 2 MG/ML
4 INJECTION INTRAMUSCULAR; INTRAVENOUS EVERY 6 HOURS PRN
Status: DISCONTINUED | OUTPATIENT
Start: 2018-03-01 | End: 2018-03-02 | Stop reason: HOSPADM

## 2018-03-01 RX ORDER — PRAMIPEXOLE DIHYDROCHLORIDE 1 MG/1
2 TABLET ORAL NIGHTLY
Status: DISCONTINUED | OUTPATIENT
Start: 2018-03-01 | End: 2018-03-02 | Stop reason: HOSPADM

## 2018-03-01 RX ORDER — PRAMIPEXOLE DIHYDROCHLORIDE 1 MG/1
2 TABLET ORAL ONCE
Status: COMPLETED | OUTPATIENT
Start: 2018-03-01 | End: 2018-03-01

## 2018-03-01 RX ORDER — TRAZODONE HYDROCHLORIDE 150 MG/1
300 TABLET ORAL NIGHTLY
Status: DISCONTINUED | OUTPATIENT
Start: 2018-03-01 | End: 2018-03-02 | Stop reason: HOSPADM

## 2018-03-01 RX ORDER — SODIUM CHLORIDE 0.9 % (FLUSH) 0.9 %
1-10 SYRINGE (ML) INJECTION AS NEEDED
Status: DISCONTINUED | OUTPATIENT
Start: 2018-03-01 | End: 2018-03-01 | Stop reason: HOSPADM

## 2018-03-01 RX ORDER — SODIUM CHLORIDE, SODIUM LACTATE, POTASSIUM CHLORIDE, CALCIUM CHLORIDE 600; 310; 30; 20 MG/100ML; MG/100ML; MG/100ML; MG/100ML
50 INJECTION, SOLUTION INTRAVENOUS CONTINUOUS
Status: DISPENSED | OUTPATIENT
Start: 2018-03-01 | End: 2018-03-02

## 2018-03-01 RX ORDER — ASPIRIN 81 MG/1
81 TABLET ORAL DAILY
Status: DISCONTINUED | OUTPATIENT
Start: 2018-03-01 | End: 2018-03-02

## 2018-03-01 RX ORDER — DEXTROSE MONOHYDRATE 25 G/50ML
25 INJECTION, SOLUTION INTRAVENOUS
Status: DISCONTINUED | OUTPATIENT
Start: 2018-03-01 | End: 2018-03-02 | Stop reason: HOSPADM

## 2018-03-01 RX ORDER — MORPHINE SULFATE 2 MG/ML
1 INJECTION, SOLUTION INTRAMUSCULAR; INTRAVENOUS EVERY 4 HOURS PRN
Status: DISCONTINUED | OUTPATIENT
Start: 2018-03-01 | End: 2018-03-02 | Stop reason: HOSPADM

## 2018-03-01 RX ORDER — ENALAPRILAT 2.5 MG/2ML
1.25 INJECTION INTRAVENOUS EVERY 6 HOURS
Status: DISCONTINUED | OUTPATIENT
Start: 2018-03-01 | End: 2018-03-01

## 2018-03-01 RX ORDER — CLOPIDOGREL BISULFATE 75 MG/1
75 TABLET ORAL DAILY
Status: DISCONTINUED | OUTPATIENT
Start: 2018-03-01 | End: 2018-03-02 | Stop reason: HOSPADM

## 2018-03-01 RX ORDER — HYDRALAZINE HYDROCHLORIDE 20 MG/ML
10 INJECTION INTRAMUSCULAR; INTRAVENOUS EVERY 6 HOURS PRN
Status: DISCONTINUED | OUTPATIENT
Start: 2018-03-01 | End: 2018-03-02 | Stop reason: HOSPADM

## 2018-03-01 RX ORDER — LIDOCAINE HYDROCHLORIDE 20 MG/ML
INJECTION, SOLUTION INFILTRATION; PERINEURAL AS NEEDED
Status: DISCONTINUED | OUTPATIENT
Start: 2018-03-01 | End: 2018-03-01 | Stop reason: HOSPADM

## 2018-03-01 RX ORDER — NALOXONE HCL 0.4 MG/ML
0.4 VIAL (ML) INJECTION
Status: DISCONTINUED | OUTPATIENT
Start: 2018-03-01 | End: 2018-03-02 | Stop reason: HOSPADM

## 2018-03-01 RX ORDER — HEPARIN SODIUM 5000 [USP'U]/ML
40 INJECTION, SOLUTION INTRAVENOUS; SUBCUTANEOUS AS NEEDED
Status: DISCONTINUED | OUTPATIENT
Start: 2018-03-01 | End: 2018-03-01

## 2018-03-01 RX ORDER — METOPROLOL TARTRATE 5 MG/5ML
2.5 INJECTION INTRAVENOUS EVERY 12 HOURS
Status: DISCONTINUED | OUTPATIENT
Start: 2018-03-01 | End: 2018-03-01

## 2018-03-01 RX ORDER — ISOSORBIDE MONONITRATE 60 MG/1
120 TABLET, EXTENDED RELEASE ORAL
Status: DISCONTINUED | OUTPATIENT
Start: 2018-03-01 | End: 2018-03-02 | Stop reason: HOSPADM

## 2018-03-01 RX ORDER — ATORVASTATIN CALCIUM 40 MG/1
80 TABLET, FILM COATED ORAL NIGHTLY
Status: DISCONTINUED | OUTPATIENT
Start: 2018-03-01 | End: 2018-03-02 | Stop reason: HOSPADM

## 2018-03-01 RX ORDER — NITROGLYCERIN 5 MG/ML
INJECTION, SOLUTION INTRAVENOUS AS NEEDED
Status: DISCONTINUED | OUTPATIENT
Start: 2018-03-01 | End: 2018-03-01 | Stop reason: HOSPADM

## 2018-03-01 RX ORDER — NICOTINE POLACRILEX 4 MG
15 LOZENGE BUCCAL
Status: DISCONTINUED | OUTPATIENT
Start: 2018-03-01 | End: 2018-03-02 | Stop reason: HOSPADM

## 2018-03-01 RX ORDER — HEPARIN SODIUM 5000 [USP'U]/ML
47.2 INJECTION, SOLUTION INTRAVENOUS; SUBCUTANEOUS AS NEEDED
Status: DISCONTINUED | OUTPATIENT
Start: 2018-03-01 | End: 2018-03-01

## 2018-03-01 RX ORDER — LISINOPRIL 40 MG/1
40 TABLET ORAL
Status: DISCONTINUED | OUTPATIENT
Start: 2018-03-01 | End: 2018-03-02 | Stop reason: HOSPADM

## 2018-03-01 RX ORDER — SODIUM CHLORIDE 0.9 % (FLUSH) 0.9 %
1-10 SYRINGE (ML) INJECTION AS NEEDED
Status: DISCONTINUED | OUTPATIENT
Start: 2018-03-01 | End: 2018-03-02 | Stop reason: HOSPADM

## 2018-03-01 RX ADMIN — TRAZODONE HYDROCHLORIDE 300 MG: 150 TABLET ORAL at 23:38

## 2018-03-01 RX ADMIN — INSULIN DETEMIR 5 UNITS: 100 INJECTION, SOLUTION SUBCUTANEOUS at 21:18

## 2018-03-01 RX ADMIN — Medication 10 MG: at 01:38

## 2018-03-01 RX ADMIN — ASPIRIN 81 MG: 81 TABLET, COATED ORAL at 08:50

## 2018-03-01 RX ADMIN — SODIUM CHLORIDE, POTASSIUM CHLORIDE, SODIUM LACTATE AND CALCIUM CHLORIDE 50 ML/HR: 600; 310; 30; 20 INJECTION, SOLUTION INTRAVENOUS at 16:14

## 2018-03-01 RX ADMIN — INSULIN ASPART 3 UNITS: 100 INJECTION, SOLUTION INTRAVENOUS; SUBCUTANEOUS at 08:00

## 2018-03-01 RX ADMIN — ATORVASTATIN CALCIUM 80 MG: 40 TABLET, FILM COATED ORAL at 21:17

## 2018-03-01 RX ADMIN — PRAMIPEXOLE DIHYDROCHLORIDE 2 MG: 1 TABLET ORAL at 08:50

## 2018-03-01 RX ADMIN — MORPHINE SULFATE 1 MG: 2 INJECTION, SOLUTION INTRAMUSCULAR; INTRAVENOUS at 21:17

## 2018-03-01 RX ADMIN — HEPARIN SODIUM 7.08 UNITS/KG/HR: 10000 INJECTION, SOLUTION INTRAVENOUS at 03:55

## 2018-03-01 RX ADMIN — RANOLAZINE 1000 MG: 500 TABLET, FILM COATED, EXTENDED RELEASE ORAL at 21:19

## 2018-03-01 RX ADMIN — INSULIN ASPART 8 UNITS: 100 INJECTION, SOLUTION INTRAVENOUS; SUBCUTANEOUS at 21:18

## 2018-03-01 RX ADMIN — ISOSORBIDE MONONITRATE 120 MG: 60 TABLET, EXTENDED RELEASE ORAL at 08:50

## 2018-03-01 RX ADMIN — Medication 10 MG: at 08:00

## 2018-03-01 RX ADMIN — INSULIN ASPART 5 UNITS: 100 INJECTION, SOLUTION INTRAVENOUS; SUBCUTANEOUS at 10:57

## 2018-03-01 RX ADMIN — RANOLAZINE 1000 MG: 500 TABLET, FILM COATED, EXTENDED RELEASE ORAL at 08:50

## 2018-03-01 RX ADMIN — MORPHINE SULFATE 1 MG: 2 INJECTION, SOLUTION INTRAMUSCULAR; INTRAVENOUS at 02:55

## 2018-03-01 RX ADMIN — INSULIN DETEMIR 5 UNITS: 100 INJECTION, SOLUTION SUBCUTANEOUS at 03:07

## 2018-03-01 RX ADMIN — MORPHINE SULFATE 1 MG: 2 INJECTION, SOLUTION INTRAMUSCULAR; INTRAVENOUS at 16:13

## 2018-03-01 RX ADMIN — INSULIN ASPART 3 UNITS: 100 INJECTION, SOLUTION INTRAVENOUS; SUBCUTANEOUS at 17:24

## 2018-03-01 RX ADMIN — HEPARIN SODIUM: 5000 INJECTION, SOLUTION INTRAVENOUS; SUBCUTANEOUS at 15:17

## 2018-03-01 RX ADMIN — CLOPIDOGREL BISULFATE 75 MG: 75 TABLET ORAL at 08:51

## 2018-03-01 RX ADMIN — ONDANSETRON 4 MG: 2 INJECTION INTRAMUSCULAR; INTRAVENOUS at 02:55

## 2018-03-01 RX ADMIN — MORPHINE SULFATE 1 MG: 2 INJECTION, SOLUTION INTRAMUSCULAR; INTRAVENOUS at 08:00

## 2018-03-01 RX ADMIN — LISINOPRIL 40 MG: 40 TABLET ORAL at 08:50

## 2018-03-01 RX ADMIN — PRAMIPEXOLE DIHYDROCHLORIDE 2 MG: 1 TABLET ORAL at 23:38

## 2018-03-01 RX ADMIN — INSULIN ASPART 3 UNITS: 100 INJECTION, SOLUTION INTRAVENOUS; SUBCUTANEOUS at 03:06

## 2018-03-01 RX ADMIN — HEPARIN SODIUM 8000 UNITS: 5000 INJECTION, SOLUTION INTRAVENOUS; SUBCUTANEOUS at 10:21

## 2018-03-01 NOTE — H&P (VIEW-ONLY)
CARDIOVASCULAR CONSULTATION   Antony Jenkins M.D., Ph.D., [facility]                Referring Provider:  DR. Mcnulty  Reason for Consultation: CP  Chief complaint CP    Subjective .     History of present illness:  Yordy Hansen   is a 37 y.o. male former patient of Dr. Briceno who is well-known to me who presented to the emergency department with recurrent chest pain after a recent admission for chest pain without evidence of objective ischemia.  Initially, the patient was having atypical chest pain, but given his numerous risk factors he underwent invasive coronary angiography with  Dr. Briceno. This showed mLAD  disease. He underwent PCI to his LAD with a 2.5mm x 16mm to the mLAD.  In March in 2017 he had recurrent CP and Dr. Briceno performed LHC which revealed ISR and underwent 2.5 x 18mm SOPHIA. He has been on OMT, but recently has had recurrent admissions with chest pain.  Of note, the patient was diagnosed with bilateral PE in April of 2017. He has had repeated CTA showing various filling defects.   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   His current regimen is Xarelto, though he has not taken the medication at home since last Sunday.    Patient was recently seen in our emergency department for chest pain that was atypical.  EKG was not acute.  Another CTA was performed which showed no evidence of dissection or residual PE.  He was admitted to the hospitalist service where he subsequently ruled out with serial cardiac enzymes.  He was discharged home with close cardiology follow-up.  Yesterday, the patient contacted my office and continued to endorse chest pain complaints.  We had discussed arranging outpatient invasive coronary angiography.  Unfortunately, the patient continued to have chest pain and presented to our emergency department.  Again, his EKG was not acute.  Initial troponin was unremarkable.  He was admitted to the hospitalist service and placed on unfractionated heparin.  He has continued  to have some intermittent pain throughout the night and this morning.  Most recent troponin has been unremarkable.     Review of Systems   Cardiovascular: Positive for chest pain and dyspnea on exertion. Negative for claudication, cyanosis, irregular heartbeat, leg swelling, near-syncope, orthopnea, palpitations, paroxysmal nocturnal dyspnea and syncope.   Respiratory: Positive for shortness of breath, sleep disturbances due to breathing and snoring. Negative for cough, hemoptysis, sputum production and wheezing.        History  Past Medical History:   Diagnosis Date   • Chest pain    • Chronic back pain    • Coronary artery disease    • Diabetes mellitus    • GERD (gastroesophageal reflux disease)    • Hyperlipidemia    • Hypertension    • Morbid obesity    • Pulmonary embolism    • RLS (restless legs syndrome)    • Seizures    • Sleep apnea    ,   Past Surgical History:   Procedure Laterality Date   • ADENOIDECTOMY     • CARDIAC CATHETERIZATION N/A 3/15/2017    Procedure: Left Heart Cath;  Surgeon: Edwige Briceno MD;  Location: [facility] INVASIVE LOCATION;  Service:    • CARDIAC CATHETERIZATION N/A 3/15/2017    Procedure: Stent SOPHIA coronary;  Surgeon: Edwige Briceno MD;  Location: [facility] CATH INVASIVE LOCATION;  Service:    • CHOLECYSTECTOMY     • CORONARY ANGIOPLASTY WITH STENT PLACEMENT     • NC RT/LT HEART CATHETERS N/A 3/15/2017    Procedure: Percutaneous Coronary Intervention;  Surgeon: Edwige Briceno MD;  Location: [facility] CATH INVASIVE LOCATION;  Service: Cardiovascular   • TONSILLECTOMY     • TRANSESOPHAGEAL ECHOCARDIOGRAM (MONICA)     ,   Family History   Problem Relation Age of Onset   • Heart disease Mother    • Hypertension Mother    • Hyperlipidemia Mother    • Coronary artery disease Mother    • Cancer Paternal Grandfather    ,   Social History   Substance Use Topics   • Smoking status: Former Smoker     Quit date: 1997   • Smokeless tobacco: Current User     Types: Snuff   • Alcohol use No   ,      Prescriptions Prior to Admission   Medication Sig Dispense Refill Last Dose   • nitroglycerin (NITROSTAT) 0.4 MG SL tablet Place 1 tablet under the tongue Every 5 (Five) Minutes As Needed for Chest Pain. Take no more than 3 doses in 15 minutes. 100 tablet 12 2/28/2018 at 1700   • albuterol (ACCUNEB) 1.25 MG/3ML nebulizer solution Take 3 mL by nebulization Every 6 (Six) Hours As Needed for Wheezing. 100 vial 0 Unknown at Unknown time   • albuterol (PROVENTIL HFA;VENTOLIN HFA) 108 (90 BASE) MCG/ACT inhaler Inhale 2 puffs Every 4 (Four) Hours As Needed for Wheezing.   Unknown at Unknown time   • amLODIPine (NORVASC) 10 MG tablet Take 1 tablet by mouth Every Night. 30 tablet 5 2/25/2018   • aspirin 81 MG EC tablet Take 1 tablet by mouth Daily. 30 tablet 0 Unknown at Unknown time   • clopidogrel (PLAVIX) 75 MG tablet Take 1 tablet by mouth Daily. 30 tablet 3 2/25/2018   • glucose blood test strip One touch Verio strips 4x daily test PRN for blood sugar 100 each 6 2/25/2018   • glucose monitor monitoring kit 1 each As Needed (check blood glucose 3x daily). 1 each 0 2/25/2018   • HYDROcodone-acetaminophen (NORCO)  MG per tablet Take 1 tablet by mouth Every 6 (Six) Hours As Needed for Moderate Pain . (Patient not taking: Reported on 3/1/2018) 15 tablet 0 Not Taking at Unknown time   • insulin aspart (novoLOG) 100 UNIT/ML injection Inject 15 Units under the skin 3 (Three) Times a Day Before Meals. 10 mL 3 2/25/2018   • insulin detemir (LEVEMIR) 100 UNIT/ML injection Inject 35 Units under the skin Every Night. 10 mL 3 2/25/2018   • isosorbide mononitrate (IMDUR) 120 MG 24 hr tablet TAKE ONE TABLET BY MOUTH ONCE DAILY 30 tablet 0 2/25/2018   • linagliptin (TRADJENTA) 5 MG tablet tablet Take 1 tablet by mouth Daily. (Patient not taking: Reported on 3/1/2018) 30 tablet 1 Not Taking at Unknown time   • lisinopril (PRINIVIL,ZESTRIL) 40 MG tablet Take 1 tablet by mouth Every Morning. 30 tablet 5 2/25/2018   • meclizine  "(ANTIVERT) 25 MG tablet Take 1 tablet by mouth 3 (Three) Times a Day As Needed for dizziness. 30 tablet 0 2/25/2018   • metFORMIN (GLUCOPHAGE) 1000 MG tablet Take 1 tablet by mouth 2 (Two) Times a Day With Meals. 60 tablet 2 2/25/2018   • metoprolol succinate XL (TOPROL XL) 200 MG 24 hr tablet Take 1 tablet by mouth Daily. 31 tablet 6 2/25/2018   • MICROLET LANCETS misc One touch delica lancets 100 each 5 2/25/2018   • ondansetron ODT (ZOFRAN-ODT) 4 MG disintegrating tablet Take 1 tablet by mouth Every 6 (Six) Hours As Needed for Nausea or Vomiting. 10 tablet 0 2/25/2018   • pantoprazole (PROTONIX) 40 MG EC tablet Take 1 tablet by mouth Every Night. 30 tablet 5 2/25/2018   • pramipexole (MIRAPEX) 1 MG tablet Take 2 tablets by mouth Every Night. Taking 2mg daily 60 tablet 5 2/25/2018   • pravastatin (PRAVACHOL) 80 MG tablet Take 1 tablet by mouth Every Night. 30 tablet 5 2/25/2018   • ranolazine (RANEXA) 1000 MG 12 hr tablet Take 1 tablet by mouth Every 12 (Twelve) Hours. 60 tablet 5 2/25/2018   • RELION INSULIN SYRINGE 31G X 15/64\" 0.5 ML misc    2/25/2018   • rivaroxaban (XARELTO) 20 MG tablet Take 1 tablet by mouth Daily. 30 tablet 4 2/25/2018   • sertraline (ZOLOFT) 25 MG tablet Take 1 tablet by mouth Daily. 30 tablet 5 2/25/2018   • traZODone (DESYREL) 300 MG tablet Take 1 tablet by mouth Every Night. 30 tablet 5 2/25/2018    and Allergies:  Glipizide; Reglan [metoclopramide]; Tramadol; and Risperdal [risperidone]    Objective   Body mass index is 65.27 kg/(m^2).         Anticoagulants     Start     Dose/Rate Route Frequency Ordered Stop    03/01/18 0230  heparin 09814 units/250 mL (100 units/mL) in 0.45 % NaCl infusion      7.08 Units/kg/hr × 212 kg  15 mL/hr  Intravenous Titrated 03/01/18 0158      03/01/18 0157  heparin (porcine) 5000 UNIT/ML injection 10,000 Units      47.2 Units/kg × 212 kg Intravenous As Needed 03/01/18 0158      03/01/18 0157  heparin (porcine) 5000 UNIT/ML injection 8,500 Units      40 " Units/kg × 212 kg Intravenous As Needed 03/01/18 0158                Vital Sign Min/Max for last 24 hours  Temp  Min: 98.4 °F (36.9 °C)  Max: 98.8 °F (37.1 °C)   BP  Min: 158/94  Max: 187/92   Pulse  Min: 70  Max: 85   Resp  Min: 22  Max: 40   SpO2  Min: 92 %  Max: 100 %   Flow (L/min)  Min: 2  Max: 2   Weight  Min: 212 kg (468 lb)  Max: 220 kg (484 lb)       Physical Exam:  Vitals:    03/01/18 0341   BP: 177/77   Pulse: 85   Resp: 22   Temp: 98.7 °F (37.1 °C)   SpO2: 92%     Body mass index is 65.27 kg/(m^2).   Pulse Ox: Normal   General: alert, appears stated age and cooperative  Body Habitus: super obese  HEENT: Head: Normocephalic, no lesions, without obvious abnormality. No arcus senilis, xanthelasma or xanthomas.  JVP: 8 cm of water at 45 degrees   Volume/Pulsation: Normal  Carotid Exam: normal pulsation bilaterally   Carotid Volume: {Desc;  Chest:  Normal Excursion: Good    I:E: normal  Pulmonary:Normal     Precordium: Normal impulses. P2 is not palpable.   [place]:  normal size and placement Palpable S4: absent.  Heart rate: normal    Heart Rhythm: regular     Heart Sounds: S1: normal intensity  S2: increased intensity, increased A2  S3: absent   S4: absent  Opening Snap: absent  A2-OS:  absent.   Pericardial rub: absent    Ejection click: None      Murmurs:  absent   Extremity: moves all extremities equally.   DATA REVIEWED:                       Assessment/Plan     1. Chest pain syndrome without evidence of ischemia.  Mr. Hansen has had recurrent emergency department visits and inpatient admissions for chest pain without recent evidence of ischemia.  With that said, he certainly has numerous risk factors for worsening obstructive CAD.  He also has a LAD stent that was complicated with ISR and required an additional 2.5 x 18 mm Xience deployment.  He has had intermittent chest pain throughout the night.  His most recent troponin was unremarkable.  His EKG this morning is pending.  Currently, he continues to  endorse chest pain, but is hemodynamically stable.  There is been no evidence of electrical instability or congestive heart failure.  I had a discussion with him that I still do not believe that his chest pain represents coronary artery disease, but at this point the next best step would be to proceed with invasive coronary angiography.  His most recent CTA showed no evidence of pulmonary embolism or dissection.  Given his BMI he is at increased risk of complications for invasive coronary angiography, including risk of skin damage from fluoroscopy.  These risks were discussed with the patient.  He is agreeable to proceeding.  I told him that either Dr. Reinoso or Dr. Parsons would see the patient and arrange invasive coronary angiography at some point today.  He was agreeable to this approach.    The indications, risks and benefits of diagnostic left heart cardiac catheterization, angiography, conscious sedation, and possible blood transfusion were discussed in detail with the patient. The potential complications of 1/2000 chance of death, 1/1000 chance of heart attack or stroke, 1/500 chance of bleeding or clotting of the femoral artery, and 1/500 chance of allergic reaction to contrast were discussed. We also reviewed possible complications of infection and kidney dysfunction. If PCI were performed and intra-coronary stents indicated, we discussed the details about  SOPHIA. This included a review of the risks of the infrequent, but relatively higher incidence of late thrombosis with SOPHIA. The importance of maintaining a consistent daily regimen of aspirin and an additional anti-platelet agent for as long as directed after implantation was emphasized. No contraindications were found.  The patient  appeared to understand and agree to the above.    -I will restart aspirin, Plavix, metoprolol, Imdur and Ranexa  -Agree with unfractionated heparin for now  -Plan for LHC at some point today  -Limited TTE with Definity  contrast to evaluate for RWMAs    2. PE diagnosed in April 2017.  He does have point mutation and factor II.  He is currently off Xarelto.   -UFH for now  -Will need to be placed back on his home regimen after his coronary angiography    Disposition: Will follow. [facility] today.          This document has been electronically signed by Antony Jenkins MD PhD on March 1, 2018 7:53 AM

## 2018-03-01 NOTE — PLAN OF CARE
Problem: Patient Care Overview (Adult)  Goal: Plan of Care Review  Outcome: Ongoing (interventions implemented as appropriate)   03/01/18 1329   Outcome Evaluation   Outcome Summary/Follow up Plan patient recieving midline today, and heart cath this afternoon, heparin drip continued      Goal: Adult Individualization and Mutuality  Outcome: Ongoing (interventions implemented as appropriate)    Goal: Discharge Needs Assessment  Outcome: Ongoing (interventions implemented as appropriate)      Problem: Pain, Acute (Adult)  Goal: Identify Related Risk Factors and Signs and Symptoms  Outcome: Outcome(s) achieved Date Met: 03/01/18    Goal: Acceptable Pain Control/Comfort Level  Outcome: Ongoing (interventions implemented as appropriate)

## 2018-03-01 NOTE — PROGRESS NOTES
[facility] Medicine Services  INPATIENT PROGRESS NOTE    Length of Stay: 0  Date of Admission: 2/28/2018  Primary Care Physician: LALA Barajas    Subjective     Chief Complaint   Patient presents with   • Chest Pain   • Shortness of Breath     HPI:  37 year old male who Presents with recurrent chest pain.  Patient was recently discharged on 2/27/2018 after having negative cardiac enzyme and resolution of the chest pain.  Patient came back to the ED with a complaint of having chest pain again.    3/1/2018: Patient is still complaining of having chest pain however it is better than last night.  Patient has been evaluated by cardiology recommended for cardiac catheter to be done today.    Review of Systems   Constitutional: Positive for activity change, appetite change and fatigue. Negative for chills and fever.   HENT: Negative for sinus pressure.    Respiratory: Positive for chest tightness and shortness of breath. Negative for wheezing.    Cardiovascular: Positive for chest pain and leg swelling. Negative for palpitations.   Gastrointestinal: Negative for abdominal pain, constipation, diarrhea, nausea and vomiting.   Endocrine: Negative for cold intolerance and heat intolerance.   Genitourinary: Negative for dysuria and frequency.   Musculoskeletal: Negative for back pain and gait problem.   Skin: Negative for pallor and rash.   Neurological: Negative for dizziness and light-headedness.   Psychiatric/Behavioral: Negative for agitation and behavioral problems.        All pertinent negatives and positives are as above. All other systems have been reviewed and are negative unless otherwise stated.     Objective      Vital Sign Min/Max for last 24 hours  Temp  Min: 97.5 °F (36.4 °C)  Max: 98.8 °F (37.1 °C)   BP  Min: 133/73  Max: 187/92   Pulse  Min: 70  Max: 85   Resp  Min: 22  Max: 40   SpO2  Min: 92 %  Max: 100 %   Flow (L/min)  Min: 2  Max: 3   Weight  Min: 212 kg  (468 lb)  Max: 220 kg (484 lb)         Physical Exam   Constitutional: He is oriented to person, place, and time. He appears well-developed and well-nourished.  Non-toxic appearance. He does not have a sickly appearance. He does not appear ill. No distress.   HENT:   Head: Normocephalic and atraumatic.   Eyes: EOM are normal. Pupils are equal, round, and reactive to light.   Neck: Normal range of motion.   Cardiovascular: Normal rate, regular rhythm and normal heart sounds.    Pulmonary/Chest: Effort normal and breath sounds normal.   Abdominal: Soft. Bowel sounds are normal. He exhibits no distension. There is no tenderness.   Musculoskeletal: Normal range of motion.   Neurological: He is alert and oriented to person, place, and time.   Skin: Skin is warm.   Psychiatric: He has a normal mood and affect. His behavior is normal.   Vitals reviewed.      Current Facility-Administered Medications   Medication Dose Route Frequency Provider Last Rate Last Dose   • [MAR Hold] aspirin EC tablet 81 mg  81 mg Oral Daily Antony Jenkins MD PhD   81 mg at 03/01/18 0850   • [MAR Hold] atorvastatin (LIPITOR) tablet 80 mg  80 mg Oral Nightly Antony Jenkins MD PhD       • [MAR Hold] clopidogrel (PLAVIX) tablet 75 mg  75 mg Oral Daily Antony Jenkins MD PhD   75 mg at 03/01/18 0851   • [MAR Hold] dextrose (D50W) solution 25 g  25 g Intravenous Q15 Min PRN Yared Mcnulty MD       • [MAR Hold] dextrose (GLUTOSE) oral gel 15 g  15 g Oral Q15 Min PRN Yared Mcnulty MD       • [MAR Hold] glucagon (human recombinant) (GLUCAGEN DIAGNOSTIC) injection 1 mg  1 mg Subcutaneous PRN Yared Mcnulty MD       • [MAR Hold] heparin 49490 units/250 mL (100 units/mL) in 0.45 % NaCl infusion  11.08 Units/kg/hr Intravenous Titrated Yared Mcnulty MD   Stopped at 03/01/18 1349    And   • [MAR Hold] heparin (porcine) 5000 UNIT/ML injection 10,000 Units  47.2 Units/kg Intravenous PRN Yared Mcnulty MD        And   • [MAR Hold] heparin  (porcine) 5000 UNIT/ML injection 8,500 Units  40 Units/kg Intravenous PRN Yared Mcnulty MD   8,000 Units at 03/01/18 1021   • hydrALAZINE (APRESOLINE) injection 10 mg  10 mg Intravenous Q6H PRN Yared Mcnulty MD   10 mg at 03/01/18 0800   • [MAR Hold] insulin aspart (novoLOG) injection 0-14 Units  0-14 Units Subcutaneous 4x Daily AC & at Bedtime Yared Mcnulty MD   5 Units at 03/01/18 1057   • [MAR Hold] insulin detemir (LEVEMIR) injection 5 Units  5 Units Subcutaneous Nightly Yared Mcnulty MD   5 Units at 03/01/18 0307   • [MAR Hold] isosorbide mononitrate (IMDUR) 24 hr tablet 120 mg  120 mg Oral Q24H Antony Jenkins MD PhD   120 mg at 03/01/18 0850   • lactated ringers infusion  50 mL/hr Intravenous Continuous Antony Jenkins MD PhD       • lisinopril (PRINIVIL,ZESTRIL) tablet 40 mg  40 mg Oral Q24H Antony Jenkins MD PhD   40 mg at 03/01/18 0850   • [MAR Hold] morphine injection 1 mg  1 mg Intravenous Q4H PRN Yared Mcnulty MD   1 mg at 03/01/18 0800    And   • [MAR Hold] naloxone (NARCAN) injection 0.4 mg  0.4 mg Intravenous Q5 Min PRN Yared Mcnulty MD       • [MAR Hold] nitroglycerin (TRIDIL) 200 mcg radial artery injection  1 mL Intra-arterial Once Antony Jenkins MD PhD       • [MAR Hold] ondansetron (ZOFRAN) injection 4 mg  4 mg Intravenous Q6H PRN Yared Mcnulty MD   4 mg at 03/01/18 0255   • [MAR Hold] ranolazine (RANEXA) 12 hr tablet 1,000 mg  1,000 mg Oral Q12H Antony Jenkins MD PhD   1,000 mg at 03/01/18 0850   • [MAR Hold] sodium chloride 0.9 % flush 1-10 mL  1-10 mL Intravenous PRN Yared Mcnulty MD       • sodium chloride 0.9 % flush 10 mL  10 mL Intravenous PRN Red Loya MD               Results Review:  I have reviewed the labs, radiology results, and diagnostic studies.    Laboratory Data:     Results from last 7 days  Lab Units 03/01/18  0210 02/28/18  1924 02/27/18  0519 02/27/18  0030   SODIUM mmol/L 137 134* 138 140   POTASSIUM mmol/L 3.7 4.4 3.9 3.9    CHLORIDE mmol/L 99 94* 99 100   CO2 mmol/L 26.0 28.0 26.0 28.0   BUN mg/dL 11 14 12 13   CREATININE mg/dL 0.59* 0.74 0.63* 0.68*   GLUCOSE mg/dL 206* 425* 291* 228*   CALCIUM mg/dL 8.8 9.2 8.8 8.8   BILIRUBIN mg/dL  --  0.3  --  0.2   ALK PHOS U/L  --  89  --  76   ALT (SGPT) U/L  --  60  --  42   AST (SGOT) U/L  --  28  --  35   ANION GAP mmol/L 12.0 12.0 13.0 12.0     Estimated Creatinine Clearance: 309.1 mL/min (by C-G formula based on Cr of 0.59).            Results from last 7 days  Lab Units 03/01/18  0210 02/28/18  1924 02/27/18  0519 02/27/18  0030   WBC 10*3/mm3 8.00 7.00 6.23 8.73   HEMOGLOBIN g/dL 12.8* 13.5* 13.0* 12.6*   HEMATOCRIT % 38.1* 40.9 38.8* 37.6*   PLATELETS 10*3/mm3 159 186 147* 171       Results from last 7 days  Lab Units 03/01/18  0300 02/28/18 1924   INR  0.92 0.90           Culture Data:   No results found for: BLOODCX  No results found for: URINECX  No results found for: RESPCX  No results found for: WOUNDCX  No results found for: STOOLCX  No components found for: BODYFLD      Radiology Data:   Imaging Results (last 24 hours)     Procedure Component Value Units Date/Time    XR Chest 1 View [[id number]] Collected:  02/28/18 1930     Updated:  02/28/18 1957    Narrative:         [facility] [place]    Patient Name: SHAWNTREYIN    ORDERING: MAGED MONTEMAYOR    ATTENDING: ERIC HARRY     REFERRING: MAGED MONTEMAYOR  -----------------------    PROCEDURE: CR chest one view    INDICATION: Chest pain    TECHNIQUE: Single AP portable view of the chest    COMPARISON: 2/27/2018    FINDINGS:   No focal air space opacity, pleural effusion or pneumothorax.  Stable borderline enlargement of the cardiac silhouette. The  pulmonary vasculature is within normal limits . The bones are  unremarkable.        Impression:       Stable borderline cardiomegaly. No radiographic evidence of an  acute cardiopulmonary process.    Electronically signed by:  Deedee Alvarado MD  2/28/2018  7:56  PM [facility] Workstation: [id number]    IR Insert Midline Without Port Pump 5 Plus [[id number]] Resulted:  03/01/18 1325     Updated:  03/01/18 1325    Narrative:       This procedure was auto-finalized with no dictation required.    US Guided Vascular Access [[id number]] Resulted:  03/01/18 1326     Updated:  03/01/18 1326    Narrative:       This procedure was auto-finalized with no dictation required.          I have reviewed the patient current medications.     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     1.  Precordial chest pain: Plan for cardiac catheter to be done today.  Patient has been seen and evaluated by Dr. Jenkins.  He is on heparin drip for anticoagulation, Xarelto is on hold for more than 36 hour at this point.  2.  Diabetes mellitus: Continue with basal and sliding scale insulin.  3.  Chronic PE: Heparin.  4.  Morbid obesity: Dietary consult.  5.  Chronic pain.  6.  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    7.  Hyperlipidemia: Statin.    Discharge Planning: I expect patient to be discharged to home in 1-2 days.      DVT Prophylaxis: heparin               This document has been electronically signed by Gonsalo Hogue MD on March 1, 2018 3:24 PM

## 2018-03-01 NOTE — CONSULTS
CARDIOVASCULAR CONSULTATION   Antony Jenkins M.D., Ph.D., [facility]                Referring Provider:  DR. Mcnulty  Reason for Consultation: CP  Chief complaint CP    Subjective .     History of present illness:  Yordy Hansen   is a 37 y.o. male former patient of Dr. Briceno who is well-known to me who presented to the emergency department with recurrent chest pain after a recent admission for chest pain without evidence of objective ischemia.  Initially, the patient was having atypical chest pain, but given his numerous risk factors he underwent invasive coronary angiography with  Dr. Briceno. This showed mLAD  disease. He underwent PCI to his LAD with a 2.5mm x 16mm to the mLAD.  In March in 2017 he had recurrent CP and Dr. Briceno performed LHC which revealed ISR and underwent 2.5 x 18mm SOPHIA. He has been on OMT, but recently has had recurrent admissions with chest pain.  Of note, the patient was diagnosed with bilateral PE in April of 2017. He has had repeated CTA showing various filling defects.   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   His current regimen is Xarelto, though he has not taken the medication at home since last Sunday.    Patient was recently seen in our emergency department for chest pain that was atypical.  EKG was not acute.  Another CTA was performed which showed no evidence of dissection or residual PE.  He was admitted to the hospitalist service where he subsequently ruled out with serial cardiac enzymes.  He was discharged home with close cardiology follow-up.  Yesterday, the patient contacted my office and continued to endorse chest pain complaints.  We had discussed arranging outpatient invasive coronary angiography.  Unfortunately, the patient continued to have chest pain and presented to our emergency department.  Again, his EKG was not acute.  Initial troponin was unremarkable.  He was admitted to the hospitalist service and placed on unfractionated heparin.  He has continued  to have some intermittent pain throughout the night and this morning.  Most recent troponin has been unremarkable.     Review of Systems   Cardiovascular: Positive for chest pain and dyspnea on exertion. Negative for claudication, cyanosis, irregular heartbeat, leg swelling, near-syncope, orthopnea, palpitations, paroxysmal nocturnal dyspnea and syncope.   Respiratory: Positive for shortness of breath, sleep disturbances due to breathing and snoring. Negative for cough, hemoptysis, sputum production and wheezing.        History  Past Medical History:   Diagnosis Date   • Chest pain    • Chronic back pain    • Coronary artery disease    • Diabetes mellitus    • GERD (gastroesophageal reflux disease)    • Hyperlipidemia    • Hypertension    • Morbid obesity    • Pulmonary embolism    • RLS (restless legs syndrome)    • Seizures    • Sleep apnea    ,   Past Surgical History:   Procedure Laterality Date   • ADENOIDECTOMY     • CARDIAC CATHETERIZATION N/A 3/15/2017    Procedure: Left Heart Cath;  Surgeon: Edwige Briceno MD;  Location: [facility] INVASIVE LOCATION;  Service:    • CARDIAC CATHETERIZATION N/A 3/15/2017    Procedure: Stent SOPHIA coronary;  Surgeon: Edwige Briceno MD;  Location: [facility] CATH INVASIVE LOCATION;  Service:    • CHOLECYSTECTOMY     • CORONARY ANGIOPLASTY WITH STENT PLACEMENT     • ME RT/LT HEART CATHETERS N/A 3/15/2017    Procedure: Percutaneous Coronary Intervention;  Surgeon: Edwige Briceno MD;  Location: [facility] CATH INVASIVE LOCATION;  Service: Cardiovascular   • TONSILLECTOMY     • TRANSESOPHAGEAL ECHOCARDIOGRAM (MONICA)     ,   Family History   Problem Relation Age of Onset   • Heart disease Mother    • Hypertension Mother    • Hyperlipidemia Mother    • Coronary artery disease Mother    • Cancer Paternal Grandfather    ,   Social History   Substance Use Topics   • Smoking status: Former Smoker     Quit date: 1997   • Smokeless tobacco: Current User     Types: Snuff   • Alcohol use No   ,    Prescriptions Prior to Admission   Medication Sig Dispense Refill Last Dose   • nitroglycerin (NITROSTAT) 0.4 MG SL tablet Place 1 tablet under the tongue Every 5 (Five) Minutes As Needed for Chest Pain. Take no more than 3 doses in 15 minutes. 100 tablet 12 2/28/2018 at 1700   • albuterol (ACCUNEB) 1.25 MG/3ML nebulizer solution Take 3 mL by nebulization Every 6 (Six) Hours As Needed for Wheezing. 100 vial 0 Unknown at Unknown time   • albuterol (PROVENTIL HFA;VENTOLIN HFA) 108 (90 BASE) MCG/ACT inhaler Inhale 2 puffs Every 4 (Four) Hours As Needed for Wheezing.   Unknown at Unknown time   • amLODIPine (NORVASC) 10 MG tablet Take 1 tablet by mouth Every Night. 30 tablet 5 2/25/2018   • aspirin 81 MG EC tablet Take 1 tablet by mouth Daily. 30 tablet 0 Unknown at Unknown time   • clopidogrel (PLAVIX) 75 MG tablet Take 1 tablet by mouth Daily. 30 tablet 3 2/25/2018   • glucose blood test strip One touch Verio strips 4x daily test PRN for blood sugar 100 each 6 2/25/2018   • glucose monitor monitoring kit 1 each As Needed (check blood glucose 3x daily). 1 each 0 2/25/2018   • HYDROcodone-acetaminophen (NORCO)  MG per tablet Take 1 tablet by mouth Every 6 (Six) Hours As Needed for Moderate Pain . (Patient not taking: Reported on 3/1/2018) 15 tablet 0 Not Taking at Unknown time   • insulin aspart (novoLOG) 100 UNIT/ML injection Inject 15 Units under the skin 3 (Three) Times a Day Before Meals. 10 mL 3 2/25/2018   • insulin detemir (LEVEMIR) 100 UNIT/ML injection Inject 35 Units under the skin Every Night. 10 mL 3 2/25/2018   • isosorbide mononitrate (IMDUR) 120 MG 24 hr tablet TAKE ONE TABLET BY MOUTH ONCE DAILY 30 tablet 0 2/25/2018   • linagliptin (TRADJENTA) 5 MG tablet tablet Take 1 tablet by mouth Daily. (Patient not taking: Reported on 3/1/2018) 30 tablet 1 Not Taking at Unknown time   • lisinopril (PRINIVIL,ZESTRIL) 40 MG tablet Take 1 tablet by mouth Every Morning. 30 tablet 5 2/25/2018   • meclizine  "(ANTIVERT) 25 MG tablet Take 1 tablet by mouth 3 (Three) Times a Day As Needed for dizziness. 30 tablet 0 2/25/2018   • metFORMIN (GLUCOPHAGE) 1000 MG tablet Take 1 tablet by mouth 2 (Two) Times a Day With Meals. 60 tablet 2 2/25/2018   • metoprolol succinate XL (TOPROL XL) 200 MG 24 hr tablet Take 1 tablet by mouth Daily. 31 tablet 6 2/25/2018   • MICROLET LANCETS misc One touch delica lancets 100 each 5 2/25/2018   • ondansetron ODT (ZOFRAN-ODT) 4 MG disintegrating tablet Take 1 tablet by mouth Every 6 (Six) Hours As Needed for Nausea or Vomiting. 10 tablet 0 2/25/2018   • pantoprazole (PROTONIX) 40 MG EC tablet Take 1 tablet by mouth Every Night. 30 tablet 5 2/25/2018   • pramipexole (MIRAPEX) 1 MG tablet Take 2 tablets by mouth Every Night. Taking 2mg daily 60 tablet 5 2/25/2018   • pravastatin (PRAVACHOL) 80 MG tablet Take 1 tablet by mouth Every Night. 30 tablet 5 2/25/2018   • ranolazine (RANEXA) 1000 MG 12 hr tablet Take 1 tablet by mouth Every 12 (Twelve) Hours. 60 tablet 5 2/25/2018   • RELION INSULIN SYRINGE 31G X 15/64\" 0.5 ML misc    2/25/2018   • rivaroxaban (XARELTO) 20 MG tablet Take 1 tablet by mouth Daily. 30 tablet 4 2/25/2018   • sertraline (ZOLOFT) 25 MG tablet Take 1 tablet by mouth Daily. 30 tablet 5 2/25/2018   • traZODone (DESYREL) 300 MG tablet Take 1 tablet by mouth Every Night. 30 tablet 5 2/25/2018    and Allergies:  Glipizide; Reglan [metoclopramide]; Tramadol; and Risperdal [risperidone]    Objective   Body mass index is 65.27 kg/(m^2).         Anticoagulants     Start     Dose/Rate Route Frequency Ordered Stop    03/01/18 0230  heparin 63144 units/250 mL (100 units/mL) in 0.45 % NaCl infusion      7.08 Units/kg/hr × 212 kg  15 mL/hr  Intravenous Titrated 03/01/18 0158      03/01/18 0157  heparin (porcine) 5000 UNIT/ML injection 10,000 Units      47.2 Units/kg × 212 kg Intravenous As Needed 03/01/18 0158      03/01/18 0157  heparin (porcine) 5000 UNIT/ML injection 8,500 Units      40 " Units/kg × 212 kg Intravenous As Needed 03/01/18 0158                Vital Sign Min/Max for last 24 hours  Temp  Min: 98.4 °F (36.9 °C)  Max: 98.8 °F (37.1 °C)   BP  Min: 158/94  Max: 187/92   Pulse  Min: 70  Max: 85   Resp  Min: 22  Max: 40   SpO2  Min: 92 %  Max: 100 %   Flow (L/min)  Min: 2  Max: 2   Weight  Min: 212 kg (468 lb)  Max: 220 kg (484 lb)       Physical Exam:  Vitals:    03/01/18 0341   BP: 177/77   Pulse: 85   Resp: 22   Temp: 98.7 °F (37.1 °C)   SpO2: 92%     Body mass index is 65.27 kg/(m^2).   Pulse Ox: Normal   General: alert, appears stated age and cooperative  Body Habitus: super obese  HEENT: Head: Normocephalic, no lesions, without obvious abnormality. No arcus senilis, xanthelasma or xanthomas.  JVP: 8 cm of water at 45 degrees   Volume/Pulsation: Normal  Carotid Exam: normal pulsation bilaterally   Carotid Volume: {Desc;  Chest:  Normal Excursion: Good    I:E: normal  Pulmonary:Normal     Precordium: Normal impulses. P2 is not palpable.   [place]:  normal size and placement Palpable S4: absent.  Heart rate: normal    Heart Rhythm: regular     Heart Sounds: S1: normal intensity  S2: increased intensity, increased A2  S3: absent   S4: absent  Opening Snap: absent  A2-OS:  absent.   Pericardial rub: absent    Ejection click: None      Murmurs:  absent   Extremity: moves all extremities equally.   DATA REVIEWED:                       Assessment/Plan     1. Chest pain syndrome without evidence of ischemia.  Mr. Hansen has had recurrent emergency department visits and inpatient admissions for chest pain without recent evidence of ischemia.  With that said, he certainly has numerous risk factors for worsening obstructive CAD.  He also has a LAD stent that was complicated with ISR and required an additional 2.5 x 18 mm Xience deployment.  He has had intermittent chest pain throughout the night.  His most recent troponin was unremarkable.  His EKG this morning is pending.  Currently, he continues to  endorse chest pain, but is hemodynamically stable.  There is been no evidence of electrical instability or congestive heart failure.  I had a discussion with him that I still do not believe that his chest pain represents coronary artery disease, but at this point the next best step would be to proceed with invasive coronary angiography.  His most recent CTA showed no evidence of pulmonary embolism or dissection.  Given his BMI he is at increased risk of complications for invasive coronary angiography, including risk of skin damage from fluoroscopy.  These risks were discussed with the patient.  He is agreeable to proceeding.  I told him that either Dr. Reinoso or Dr. Parsons would see the patient and arrange invasive coronary angiography at some point today.  He was agreeable to this approach.    The indications, risks and benefits of diagnostic left heart cardiac catheterization, angiography, conscious sedation, and possible blood transfusion were discussed in detail with the patient. The potential complications of 1/2000 chance of death, 1/1000 chance of heart attack or stroke, 1/500 chance of bleeding or clotting of the femoral artery, and 1/500 chance of allergic reaction to contrast were discussed. We also reviewed possible complications of infection and kidney dysfunction. If PCI were performed and intra-coronary stents indicated, we discussed the details about  SOPHIA. This included a review of the risks of the infrequent, but relatively higher incidence of late thrombosis with SOPHIA. The importance of maintaining a consistent daily regimen of aspirin and an additional anti-platelet agent for as long as directed after implantation was emphasized. No contraindications were found.  The patient  appeared to understand and agree to the above.    -I will restart aspirin, Plavix, metoprolol, Imdur and Ranexa  -Agree with unfractionated heparin for now  -Plan for LHC at some point today  -Limited TTE with Definity  contrast to evaluate for RWMAs    2. PE diagnosed in April 2017.  He does have point mutation and factor II.  He is currently off Xarelto.   -UFH for now  -Will need to be placed back on his home regimen after his coronary angiography    Disposition: Will follow. [facility] today.          This document has been electronically signed by Antony Jenkins MD PhD on March 1, 2018 7:53 AM

## 2018-03-01 NOTE — ED NOTES
Pt also c/o increased urination, having to stop multiple times on the way to [facility].     Brad Atkins, GARY  02/28/18 8006

## 2018-03-01 NOTE — PROGRESS NOTES
TWO PATIENT IDENTIFIERS WERE USED. THE PATIENT WAS DRAPED WITH A FULL BODY DRAPE AND THE PATIENT'S RIGHT ARM WAS PREPPED WITH CHLORA PREP. ULTRASOUND WAS USED TO LOCALIZE THERIGHT CEPHALIC VEIN. SUBCUTANEOUS TISSUE AT THE CATHETER SITE WAS INFILTRATED WITH 2% LIDOCAINE. UNDER ULTRASOUND GUIDANCE, THE VEIN WAS ACCESSED WITH A 21 GAUGE  NEEDLE. AN 0.018 WIRE WAS THEN THREADED THROUGH THE NEEDLE. THE 21 GAUGE NEEDLE WAS REMOVED AND A 4 [place] SHEATH WAS PLACED OVER THE WIRE INTO THE VEIN.THE MIDLINE CATHETER WAS TRIMMED TO 20CM. THE MIDLINE CATHETER WAS THEN PLACED OVER THE WIRE INTO THE VEIN, THE SHEATH WAS PEELED AWAY, WIRE WAS REMOVED. CATHETER WAS FLUSHED WITH NORMAL SALINE AND CATHETER TIP APPLIED. BIOPATCH PLACED. CATHETER SECURED WITH STAT LOCK AND TEGADERM. PATIENT TOLERATED PROCEDURE WELL. THIS WAS DONE IN THE ANGIOSUITE      IMPRESSION:SUCCESSFUL PLACEMENT OF DUAL LUMEN MIDLINE.           Velma Flowers RN  3/1/2018  1:24 PM

## 2018-03-01 NOTE — ED PROVIDER NOTES
Subjective   Patient is a 37 y.o. male presenting with chest pain.   History provided by:  Patient  Chest Pain   Pain location:  Unable to specify  Pain quality: pressure, sharp and stabbing    Pain radiates to:  Does not radiate  Onset quality:  Sudden  Duration:  1 day  Timing:  Constant  Progression:  Unchanged  Chronicity:  Recurrent  Context: at rest    Relieved by:  Nothing  Worsened by:  Movement  Ineffective treatments:  Nitroglycerin and rest  Associated symptoms: shortness of breath    Associated symptoms: no abdominal pain, no cough, no diaphoresis, no dizziness, no fatigue, no fever, no headache, no nausea, no numbness, no palpitations, no vomiting and no weakness    Shortness of breath:     Severity:  Moderate    Onset quality:  Sudden    Duration:  1 day    Timing:  Constant    Progression:  Unchanged  Risk factors: coronary artery disease, high cholesterol, hypertension, obesity and prior DVT/PE        Review of Systems   Constitutional: Negative for activity change, appetite change, chills, diaphoresis, fatigue and fever.   HENT: Negative for congestion, ear pain, rhinorrhea, sneezing and sore throat.    Eyes: Negative for pain, redness, itching and visual disturbance.   Respiratory: Positive for shortness of breath. Negative for cough, choking, chest tightness, wheezing and stridor.    Cardiovascular: Positive for chest pain. Negative for palpitations and leg swelling.   Gastrointestinal: Negative for abdominal distention, abdominal pain, blood in stool, constipation, diarrhea, nausea, rectal pain and vomiting.   Endocrine: Positive for polyuria. Negative for polydipsia.   Genitourinary: Negative for difficulty urinating, dysuria, flank pain and frequency.   Skin: Negative for color change and rash.   Neurological: Negative for dizziness, seizures, syncope, weakness, light-headedness, numbness and headaches.   Psychiatric/Behavioral: Negative for agitation, confusion, decreased concentration and  sleep disturbance. The patient is not nervous/anxious.    All other systems reviewed and are negative.      Past Medical History:   Diagnosis Date   • Chest pain    • Chronic back pain    • Coronary artery disease    • Diabetes mellitus    • GERD (gastroesophageal reflux disease)    • Hyperlipidemia    • Hypertension    • Morbid obesity    • Pulmonary embolism    • RLS (restless legs syndrome)    • Seizures    • Sleep apnea        Allergies   Allergen Reactions   • Glipizide Other (See Comments)     Slurred Speech   • Reglan [Metoclopramide] Anxiety   • Tramadol Other (See Comments)     seizures   • Risperdal [Risperidone] Other (See Comments)     Slurred speech       Past Surgical History:   Procedure Laterality Date   • ADENOIDECTOMY     • CARDIAC CATHETERIZATION N/A 3/15/2017    Procedure: Left Heart Cath;  Surgeon: Edwige Briceno MD;  Location: [facility] CATH INVASIVE LOCATION;  Service:    • CARDIAC CATHETERIZATION N/A 3/15/2017    Procedure: Stent SOPHIA coronary;  Surgeon: Edwige Briceno MD;  Location: [facility] CATH INVASIVE LOCATION;  Service:    • CHOLECYSTECTOMY     • CORONARY ANGIOPLASTY WITH STENT PLACEMENT     • CO RT/LT HEART CATHETERS N/A 3/15/2017    Procedure: Percutaneous Coronary Intervention;  Surgeon: Edwige Briceno MD;  Location: [facility] CATH INVASIVE LOCATION;  Service: Cardiovascular   • TONSILLECTOMY     • TRANSESOPHAGEAL ECHOCARDIOGRAM (MONICA)         Family History   Problem Relation Age of Onset   • Heart disease Mother    • Hypertension Mother    • Hyperlipidemia Mother    • Coronary artery disease Mother    • Cancer Paternal Grandfather        Social History     Social History   • Marital status:      Spouse name: Cori   • Number of children: 0     Occupational History   • Disabled      Social History Main Topics   • Smoking status: Former Smoker     Quit date: 1997   • Smokeless tobacco: Current User     Types: Snuff   • Alcohol use No   • Drug use: No   • Sexual activity: Yes     Partners:  Female     Birth control/ protection: None           Objective    Vitals:    02/28/18 2002 02/28/18 2026 02/28/18 2032 02/28/18 2102   BP: 158/94  169/74 177/95   BP Location:       Patient Position:       Pulse: 76  70 80   Resp:  (!) 32     Temp:       TempSrc:       SpO2: 98%  100% 96%   Weight:       Height:         Physical Exam   Constitutional: He is oriented to person, place, and time. He appears well-developed and well-nourished.  Non-toxic appearance. He does not have a sickly appearance. He does not appear ill. He appears distressed.   HENT:   Head: Normocephalic and atraumatic.   Right Ear: External ear normal. No lacerations. No swelling or tenderness.   Left Ear: External ear normal. No lacerations. No swelling or tenderness.   Nose: Nose normal.   Mouth/Throat: Uvula is midline, oropharynx is clear and moist and mucous membranes are normal.   Eyes: Conjunctivae, EOM and lids are normal. Pupils are equal, round, and reactive to light. No scleral icterus.   Neck: No tracheal deviation present. No thyromegaly present.   Cardiovascular: Normal rate, regular rhythm and intact distal pulses.    Pulses:       Carotid pulses are 2+ on the right side, and 2+ on the left side.       Radial pulses are 2+ on the right side, and 2+ on the left side.        Dorsalis pedis pulses are 2+ on the right side, and 2+ on the left side.        Posterior tibial pulses are 2+ on the right side, and 2+ on the left side.   Unable to auscultate the heart due to patient's body habitus   Pulmonary/Chest: Effort normal. No stridor. Tachypnea noted. No respiratory distress. He has decreased breath sounds. Wheezes: due to body habitus. He exhibits no tenderness.   Noted increased work of breathing   Abdominal: Soft. Bowel sounds are normal. He exhibits no shifting dullness, no distension and no ascites. There is no tenderness. There is no rigidity, no rebound and no guarding.   Musculoskeletal:        Right knee: He exhibits no  swelling.        Left knee: He exhibits no swelling.        Right ankle: He exhibits no swelling.        Left ankle: He exhibits no swelling.        Right lower leg: He exhibits no swelling.        Left lower leg: He exhibits no swelling.        Right foot: There is no swelling.        Left foot: There is no swelling.       Vascular Status -  His exam exhibits right foot vasculature normal. His exam exhibits no right foot edema. His exam exhibits left foot vasculature normal. His exam exhibits no left foot edema.  Lymphadenopathy:     He has no cervical adenopathy.   Neurological: He is alert and oriented to person, place, and time. He has normal strength. No cranial nerve deficit or sensory deficit. He exhibits normal muscle tone. GCS eye subscore is 4. GCS verbal subscore is 5. GCS motor subscore is 6.   Skin: Skin is warm and dry. No rash noted. He is not diaphoretic. No erythema. No pallor.   Psychiatric: He has a normal mood and affect. His speech is normal.   Nursing note and vitals reviewed.      Procedures    Results for orders placed or performed during the hospital encounter of 02/28/18   Troponin   Result Value Ref Range    Troponin I <0.012 <=0.034 ng/mL   Comprehensive Metabolic Panel   Result Value Ref Range    Glucose 425 (C) 60 - 100 mg/dL    BUN 14 7 - 21 mg/dL    Creatinine 0.74 0.70 - 1.30 mg/dL    Sodium 134 (L) 137 - 145 mmol/L    Potassium 4.4 3.5 - 5.1 mmol/L    Chloride 94 (L) 95 - 110 mmol/L    CO2 28.0 22.0 - 31.0 mmol/L    Calcium 9.2 8.4 - 10.2 mg/dL    Total Protein 7.9 6.3 - 8.6 g/dL    Albumin 4.00 3.40 - 4.80 g/dL    ALT (SGPT) 60 21 - 72 U/L    AST (SGOT) 28 17 - 59 U/L    Alkaline Phosphatase 89 38 - 126 U/L    Total Bilirubin 0.3 0.2 - 1.3 mg/dL    eGFR Non  Amer 118 70 - 162 mL/min/1.73    Globulin 3.9 (H) 2.3 - 3.5 gm/dL    A/G Ratio 1.0 (L) 1.1 - 1.8 g/dL    BUN/Creatinine Ratio 18.9 7.0 - 25.0    Anion Gap 12.0 5.0 - 15.0 mmol/L   BNP   Result Value Ref Range    proBNP  66.2 0.0 - 450.0 pg/mL   Protime-INR   Result Value Ref Range    Protime 12.0 11.1 - 15.3 Seconds    INR 0.90 0.80 - 1.20   CBC Auto Differential   Result Value Ref Range    WBC 7.00 3.20 - 9.80 10*3/mm3    RBC 4.93 4.37 - 5.74 10*6/mm3    Hemoglobin 13.5 (L) 13.7 - 17.3 g/dL    Hematocrit 40.9 39.0 - 49.0 %    MCV 83.0 80.0 - 98.0 fL    MCH 27.4 26.5 - 34.0 pg    MCHC 33.0 31.5 - 36.3 g/dL    RDW 14.0 11.5 - 14.5 %    RDW-SD 42.1 35.1 - 43.9 fl    MPV 8.8 8.0 - 12.0 fL    Platelets 186 150 - 450 10*3/mm3    Neutrophil % 67.2 37.0 - 80.0 %    Lymphocyte % 19.9 10.0 - 50.0 %    Monocyte % 7.6 0.0 - 12.0 %    Eosinophil % 3.3 0.0 - 7.0 %    Basophil % 0.3 0.0 - 2.0 %    Immature Grans % 1.7 (H) 0.0 - 0.5 %    Neutrophils, Absolute 4.71 2.00 - 8.60 10*3/mm3    Lymphocytes, Absolute 1.39 0.60 - 4.20 10*3/mm3    Monocytes, Absolute 0.53 0.00 - 0.90 10*3/mm3    Eosinophils, Absolute 0.23 0.00 - 0.70 10*3/mm3    Basophils, Absolute 0.02 0.00 - 0.20 10*3/mm3    Immature Grans, Absolute 0.12 (H) 0.00 - 0.02 10*3/mm3   CK   Result Value Ref Range    Creatine Kinase 197 (H) 55 - 170 U/L   CK-MB   Result Value Ref Range    CKMB 2.18 0.00 - 5.00 ng/mL   Urinalysis With / Culture If Indicated - Urine, Clean Catch   Result Value Ref Range    Color, UA Yellow Yellow, Straw, Dark Yellow, Suni    Appearance, UA Clear Clear    pH, UA 6.5 5.0 - 9.0    Specific Gravity, UA 1.032 (H) 1.003 - 1.030    Glucose, UA >=1000 mg/dL (3+) (A) Negative    Ketones, UA Negative Negative    Bilirubin, UA Negative Negative    Blood, UA Negative Negative    Protein, UA Trace (A) Negative    Leuk Esterase, UA Negative Negative    Nitrite, UA Negative Negative    Urobilinogen, UA 1.0 E.U./dL 0.2 - 1.0 E.U./dL   D-dimer, Quantitative   Result Value Ref Range    D-Dimer, Quantitative 290 0 - 470 ng/mL (FEU)   aPTT   Result Value Ref Range    PTT 30.5 20.0 - 40.3 seconds   Light Blue Top   Result Value Ref Range    Extra Tube hold for add-on    Green Top  (Gel)   Result Value Ref Range    Extra Tube Hold for add-ons.    Lavender Top   Result Value Ref Range    Extra Tube hold for add-on    Gold Top - SST   Result Value Ref Range    Extra Tube Hold for add-ons.      Xr Chest 2 View    Result Date: 2/9/2018  EXAM:          Radiograph(s), Chest VIEWS:    PA / Lat ; 2     DATE/TIME:  2/9/2018 8:43 PM CST            INDICATION:   chest pain  COMPARISON:  CXR: 1/18/18         FINDINGS:         - lines/tubes:    none   - cardiac:         size within normal limits       - mediastinum: contour within normal limits       - lungs:         no focal air space process, pulmonary interstitial edema, nodule(s)/mass           - pleura:         no evidence of  fluid                - osseous:         unremarkable for age                - misc.:        CONCLUSION:    1. No evidence of an acute cardiopulmonary process.                                  Electronically signed by:  MIGUEL A Soni MD  2/9/2018 8:44 PM CST Workstation: [id number]    Ct Head Without Contrast    Result Date: 2/14/2018  CT head without contrast on  2/14/2018 CLINICAL INDICATION: Headache, hypertensive urgency TECHNIQUE: Multiple axial images are obtained throughout the head without the administration of contrast. This study was performed with techniques to keep radiation doses as low as reasonably achievable, (ALARA). Total DLP is 971.1 mGy*cm. COMPARISON: 2/9/2018 FINDINGS:   There is no hydrocephalus. There is no CT evidence of acute infarct. There is no hemorrhage. There are no abnormal extra-axial fluid collections. There is no mass, mass effect or midline shift. No bony abnormality is noted.     No acute intracranial abnormality. Electronically signed by:  Pelon Anderson  2/14/2018 10:48 PM CST Workstation: [id number]    Ct Head Without Contrast    Result Date: 2/9/2018  Exam: Head CT without contrast INDICATION: Headache TECHNIQUE: Routine head CT without contrast. Sagittal coronal reconstructions  were obtained. CT technique performed using ALARA. COMPARISON: 9/12/2014 FINDINGS: The bony calvarium is intact. The imaged paranasal sinuses mastoid air cells are clear. No extra-axial fluid collection. The ventricular system is normal. Normal gray-white differentiation. No sign of acute infarction. No intraparenchymal hemorrhage, mass or midline shift.     No acute intracranial abnormality. Electronically signed by:  Brennan Corrigan MD  2/9/2018 10:03 PM CST Workstation: [id number]    Us Abdomen Complete    Result Date: 2/14/2018  ULTRASOUND OF THE ABDOMEN HISTORY: Splenomegaly. Hepatomegaly. Ultrasound examination of the abdomen was performed. COMPARISON: February 9, 2016. Correlation CT January 26, 2018. Liver 24.2 cm x 23.9 cm x 17.5 cm. Fatty infiltration of the liver. No focal intrahepatic lesion. The gallbladder has been removed. Common bile duct is within normal limits at 5.2 mm. Normal pancreas. Normal kidneys. Spleen 17.07 cm x 8.11 cm x 7.32 cm with a volume of 530.60 mL. Unremarkable IVC. No abdominal aortic aneurysm. No free fluid.     CONCLUSION: Hepatosplenomegaly. Fatty infiltration of the liver. Cholecystectomy. [id number] Electronically signed by:  Kulwant Currie MD  2/14/2018 10:01 AM CST Workstation: [id number] Chest 1 View    Result Date: 2/28/2018  [facility] Patient Name: KEARA ESCOBAR ORDERING: MAGED MONTEMAYOR ATTENDING: ERIC HARRY REFERRING: MAGED MONTEMAYOR ----------------------- PROCEDURE: CR chest one view INDICATION: Chest pain TECHNIQUE: Single AP portable view of the chest COMPARISON: 2/27/2018 FINDINGS: No focal air space opacity, pleural effusion or pneumothorax. Stable borderline enlargement of the cardiac silhouette. The pulmonary vasculature is within normal limits . The bones are unremarkable.     Stable borderline cardiomegaly. No radiographic evidence of an acute cardiopulmonary process. Electronically signed by:  Deedee Alvarado MD   2/28/2018 7:56 PM CST Workstation: [id number]    Us Venous Doppler Lower Extremity Bilateral (duplex)    Result Date: 2/15/2018  Procedure: Bilateral lower extremity venous ultrasound CLINICAL INDICATION: swelling, R07.9 Chest pain, unspecified I16.0 Hypertensive urgency COMPARISON: None FINDINGS: The common femoral,  femoral,  popliteal and calf veins of the bilateral  lower extremity are well identified and compress normally.  Doppler signals are heard either spontaneously or on distal compression.  No evidence of intraluminal thrombus was noted.     CONCLUSION:  No evidence of deep venous thrombosis in the common femoral, femoral, popliteal and calf veins of the bilateral lower extremity. Electronically signed by:  Alvaro Ojeda MD  2/15/2018 9:44 AM CST Workstation: [id number]    Ct Angiogram Chest With Contrast    Result Date: 2/14/2018  CT angiogram chest with contrast on 2/14/2018 CLINICAL INDICATION: Shortness of breath, history of pulmonary embolus TECHNIQUE: Multiple axial images are obtained throughout the chest following the administration of IV contrast.  Computer generated 3D reconstructions/MIPS were performed. This study was performed with techniques to keep radiation doses as low as reasonably achievable, (ALARA). Total DLP is 1211.7 mGy*cm. COMPARISON: 1/2/2018 FINDINGS: Bolus timing is significantly suboptimal for evaluation of pulmonary embolus. No gross large or central filling defect is noted but cannot exclude pulmonary emboli on this exam. Examination is also limited by streak artifact from patient's body habitus. There is no aortic aneurysm or dissection. There is no pleural or pericardial effusion. Limited visualized upper abdomen is unremarkable. There is no thoracic adenopathy. The lungs are clear. Couple of old left rib fractures are noted. No acute bony abnormality is noted.     1. Bolus timing is markedly suboptimal for evaluation of pulmonary embolus with no evidence of a gross large or  central pulmonary embolus but cannot otherwise exclude pulmonary emboli on this exam. 2. Otherwise essentially unremarkable. Electronically signed by:  Pelon Anderson  2/14/2018 10:54 PM CST Workstation: [id number]    Xr Chest Pa & Lateral    Result Date: 2/27/2018  Chest 2 view on  2/27/2018 CLINICAL INDICATION: Chest pain, shortness of breath COMPARISON: 2/9/2018 FINDINGS: Heart is upper limits normal for size. The lungs are clear. Hilar and mediastinal contours are within normal limits. No bony abnormality is noted.     No acute disease. Electronically signed by:  Pelon Anderson  2/27/2018 2:14 AM CST Workstation: [id number]       ED Course  ED Course      Patient was evaluated in the ER for dyspnea and chest pain.  It started at home a few hours ago and was not relieved by nitroglycerin.  He was discharged yesterday from the hospital service after a night of observation due to chest pain.  He admits that he has not taken any of his medications for the past 28 hours.  Patient is in some distress and is tachypneic on physical exam.  His O2 is satting well.  Lab workup was significant for glucose of 425.  He was given bolus of IVF.  UA was significant for elevated specific gravity and glucose over 1000.  D-dimer BNP troponin PTT INR and CBC were unimpressive.  Patient was given aspirin in the ER.  Was also given morphine and nitroglycerin for his pain which helped resolve little bit.  EKG was not changed from the last one done 2 days ago.  Patient's glucose corrected to below 300 after part of his fluid was given.  His tachypnea improved, but he was still in moderate distress.  Of note, his cardiologist recently documented that due to the patient's frequent chest pain and subsequent chest pain rule out observations in the hospital, the patient will require a cardiac catheterization.  This was going to be arranged as an outpatient.  Patient was discussed with the on-call hospitalist, who agreed that  the patient needed to come in for observation.  At the time of admission into the hospital patient was showing clinical signs of improvement and was not as distressed as when he first came in.        HEART Score (for prediction of 6-week risk of major adverse cardiac event) reviewed and/or performed as part of the patient evaluation and treatment planning process.  The result associated with this review/performance is: 4       MDM  Number of Diagnoses or Management Options  Chest pain, unspecified type: established and worsening  Dyspnea, unspecified type: new and requires workup     Amount and/or Complexity of Data Reviewed  Clinical lab tests: reviewed and ordered  Tests in the radiology section of CPT®: reviewed and ordered  Tests in the medicine section of CPT®: reviewed and ordered  Decide to obtain previous medical records or to obtain history from someone other than the patient: yes  Obtain history from someone other than the patient: yes  Review and summarize past medical records: yes  Discuss the patient with other providers: yes  Independent visualization of images, tracings, or specimens: yes    Risk of Complications, Morbidity, and/or Mortality  Presenting problems: moderate  Diagnostic procedures: moderate  Management options: moderate    Patient Progress  Patient progress: stable      Final diagnoses:   Chest pain, unspecified type   Dyspnea, unspecified type           This document has been electronically signed by Bob Ruiz MD on February 28, 2018 10:10 PM      Bob Ruiz MD  Resident  02/28/18 1918

## 2018-03-01 NOTE — INTERVAL H&P NOTE
H&P updated. The patient was examined and the following changes are noted:  Patient explained about the cardiac catheterization , risks and benefits explained and patient consented.

## 2018-03-01 NOTE — PLAN OF CARE
Problem: Patient Care Overview (Adult)  Goal: Plan of Care Review  Outcome: Ongoing (interventions implemented as appropriate)   03/01/18 0243   Coping/Psychosocial Response Interventions   Plan Of Care Reviewed With patient   Patient Care Overview   Progress no change   Outcome Evaluation   Outcome Summary/Follow up Plan Patient BP high, prn hydralazine given, heparin drip began, NPO, waiting on cardio consult     Goal: Adult Individualization and Mutuality  Outcome: Ongoing (interventions implemented as appropriate)    Goal: Discharge Needs Assessment  Outcome: Ongoing (interventions implemented as appropriate)      Problem: Pain, Acute (Adult)  Goal: Identify Related Risk Factors and Signs and Symptoms  Outcome: Ongoing (interventions implemented as appropriate)    Goal: Acceptable Pain Control/Comfort Level  Outcome: Ongoing (interventions implemented as appropriate)

## 2018-03-01 NOTE — H&P
HISTORY AND PHYSICAL  NAME: Yordy Hansen  : 1980  MRN: [id number]    DATE OF ADMISSION: 18    DATE & TIME SEEN: 18 10:07 PM    PCP: LALA Barajas    CODE STATUS: Full Code    CHIEF COMPLAINT Chest pain    HPI:  Yordy Hansen is a 37 y.o. male who presents with recurrent chest pain.  She was just admitted and discharged from our service.  Patient was discharged to 2018 after having negative enzymes and chest pain resolution.  Patient states when he went home he did okay however he did not restart any of his medications.  Patient states that he woke up yesterday morning with some chest discomfort and took 3 nitroglycerin.  Patient states that his chest pain resolved and then he called both his PCP and Dr. Jenkins's office.  Per Dr. Jenkins's telephone note it appears that there would be consideration for heart catheter outpatient if patient's chest pain continued.  Patient then states that he had 5 PM his symptoms came back and he took 3 more nitroglycerin.  Patient states that his chest pain did not resolve at this point and he came to the emergency department.  At time of exam patient is essentially chest pain-free; however, patient states that he can feel it is starting to come back.  Patient does endorse some shortness of air that is chronic.  Patient does endorse some diaphoresis yesterday morning and yesterday evening.  Patient also endorses some nausea.  However patient also states that he had increased thirst and urination yesterday.  Patient was found to be hyperglycemic.    CONCURRENT MEDICAL HISTORY:  Past Medical History:   Diagnosis Date   • Chest pain    • Chronic back pain    • Coronary artery disease    • Diabetes mellitus    • GERD (gastroesophageal reflux disease)    • Hyperlipidemia    • Hypertension    • Morbid obesity    • Pulmonary embolism    • RLS (restless legs syndrome)    • Seizures    • Sleep apnea        PAST SURGICAL HISTORY:  Past Surgical  History:   Procedure Laterality Date   • ADENOIDECTOMY     • CARDIAC CATHETERIZATION N/A 3/15/2017    Procedure: Left Heart Cath;  Surgeon: Edwige Briceno MD;  Location: [facility] CATH INVASIVE LOCATION;  Service:    • CARDIAC CATHETERIZATION N/A 3/15/2017    Procedure: Stent SOPHIA coronary;  Surgeon: Edwige Briceno MD;  Location: [facility] CATH INVASIVE LOCATION;  Service:    • CHOLECYSTECTOMY     • CORONARY ANGIOPLASTY WITH STENT PLACEMENT     • CT RT/LT HEART CATHETERS N/A 3/15/2017    Procedure: Percutaneous Coronary Intervention;  Surgeon: Edwige Briceno MD;  Location: [facility] CATH INVASIVE LOCATION;  Service: Cardiovascular   • TONSILLECTOMY     • TRANSESOPHAGEAL ECHOCARDIOGRAM (MONICA)         FAMILY HISTORY:  Family History   Problem Relation Age of Onset   • Heart disease Mother    • Hypertension Mother    • Hyperlipidemia Mother    • Coronary artery disease Mother    • Cancer Paternal Grandfather         SOCIAL HISTORY:  Social History     Social History   • Marital status:      Spouse name: Cori   • Number of children: 0   • Years of education: N/A     Occupational History   • Disabled      Social History Main Topics   • Smoking status: Former Smoker     Quit date: 1997   • Smokeless tobacco: Current User     Types: Snuff   • Alcohol use No   • Drug use: No   • Sexual activity: Yes     Partners: Female     Birth control/ protection: None     Other Topics Concern   • Not on file     Social History Narrative       HOME MEDICATIONS:  Prior to Admission medications    Medication Sig Start Date End Date Taking? Authorizing Provider   nitroglycerin (NITROSTAT) 0.4 MG SL tablet Place 1 tablet under the tongue Every 5 (Five) Minutes As Needed for Chest Pain. Take no more than 3 doses in 15 minutes. 11/10/17  Yes Earnest Perez MD   albuterol (ACCUNEB) 1.25 MG/3ML nebulizer solution Take 3 mL by nebulization Every 6 (Six) Hours As Needed for Wheezing. 11/15/17   LALA Wick   albuterol (PROVENTIL HFA;VENTOLIN  HFA) 108 (90 BASE) MCG/ACT inhaler Inhale 2 puffs Every 4 (Four) Hours As Needed for Wheezing.    Historical Provider, MD   amLODIPine (NORVASC) 10 MG tablet Take 1 tablet by mouth Every Night. 11/10/17   Earnest Perez MD   aspirin 81 MG EC tablet Take 1 tablet by mouth Daily. 2/11/18   Antonio Barrett MD   clopidogrel (PLAVIX) 75 MG tablet Take 1 tablet by mouth Daily. 2/5/18   LALA Barajas   glucose blood test strip One touch Verio strips 4x daily test PRN for blood sugar 2/13/18   Earnest Perez MD   glucose monitor monitoring kit 1 each As Needed (check blood glucose 3x daily). 1/22/18   LALA Barajas   HYDROcodone-acetaminophen (NORCO)  MG per tablet Take 1 tablet by mouth Every 6 (Six) Hours As Needed for Moderate Pain . 1/6/18   Red Loya MD   insulin aspart (novoLOG) 100 UNIT/ML injection Inject 15 Units under the skin 3 (Three) Times a Day Before Meals. 1/25/18   LALA Barajas   insulin detemir (LEVEMIR) 100 UNIT/ML injection Inject 35 Units under the skin Every Night. 1/25/18   LALA Barajas   isosorbide mononitrate (IMDUR) 120 MG 24 hr tablet TAKE ONE TABLET BY MOUTH ONCE DAILY 2/5/18   LALA Barajas   linagliptin (TRADJENTA) 5 MG tablet tablet Take 1 tablet by mouth Daily. 2/8/18   LALA Barajas   lisinopril (PRINIVIL,ZESTRIL) 40 MG tablet Take 1 tablet by mouth Every Morning. 11/10/17   Earnest Perez MD   meclizine (ANTIVERT) 25 MG tablet Take 1 tablet by mouth 3 (Three) Times a Day As Needed for dizziness. 2/13/18   LALA Barajas   metFORMIN (GLUCOPHAGE) 1000 MG tablet Take 1 tablet by mouth 2 (Two) Times a Day With Meals. 1/18/18   Willy Puckett MD   metoprolol succinate XL (TOPROL XL) 200 MG 24 hr tablet Take 1 tablet by mouth Daily. 1/26/18   LALA Barajas   MICROLET LANCETS misc One touch delica lancets 1/26/18   LALA Barajas   ondansetron ODT (ZOFRAN-ODT) 4 MG disintegrating tablet Take 1 tablet by mouth  "Every 6 (Six) Hours As Needed for Nausea or Vomiting. 1/6/18   Red Loya MD   pantoprazole (PROTONIX) 40 MG EC tablet Take 1 tablet by mouth Every Night. 11/10/17   Earnest Perez MD   pramipexole (MIRAPEX) 1 MG tablet Take 2 tablets by mouth Every Night. Taking 2mg daily 11/10/17   Earnest Perez MD   pravastatin (PRAVACHOL) 80 MG tablet Take 1 tablet by mouth Every Night. 11/10/17   Earnest Perez MD   ranolazine (RANEXA) 1000 MG 12 hr tablet Take 1 tablet by mouth Every 12 (Twelve) Hours. 11/10/17   Earnest Perez MD   RELION INSULIN SYRINGE 31G X 15/64\" 0.5 ML misc  1/18/18   Historical Provider, MD   rivaroxaban (XARELTO) 20 MG tablet Take 1 tablet by mouth Daily. 2/2/18   LALA Valencia   sertraline (ZOLOFT) 25 MG tablet Take 1 tablet by mouth Daily. 11/10/17   Earnest Perez MD   traZODone (DESYREL) 300 MG tablet Take 1 tablet by mouth Every Night. 11/10/17   Earnest Perez MD       ALLERGIES:  Glipizide; Reglan [metoclopramide]; Tramadol; and Risperdal [risperidone]    REVIEW OF SYSTEMS  Review of Systems   Constitutional: Positive for fatigue. Negative for activity change, appetite change and fever.   HENT: Negative for ear pain and sore throat.    Eyes: Negative for pain and visual disturbance.   Respiratory: Positive for cough and shortness of breath.    Cardiovascular: Positive for chest pain. Negative for palpitations.   Gastrointestinal: Negative for abdominal pain and nausea.   Endocrine: Negative for cold intolerance and heat intolerance.   Genitourinary: Negative for difficulty urinating and dysuria.   Musculoskeletal: Positive for arthralgias and back pain. Negative for gait problem.   Skin: Negative for color change and rash.   Neurological: Negative for dizziness, weakness and headaches.   Hematological: Negative for adenopathy. Does not bruise/bleed easily.   Psychiatric/Behavioral: Negative for agitation, confusion and sleep disturbance.       PHYSICAL " EXAM:  Temp:  [98.8 °F (37.1 °C)] 98.8 °F (37.1 °C)  Heart Rate:  [70-80] 80  Resp:  [24-40] 32  BP: (158-181)/(74-95) 177/95  Body mass index is 65.27 kg/(m^2).     Physical Exam   Constitutional: He is oriented to person, place, and time. He appears well-developed and well-nourished. No distress.   HENT:   Head: Normocephalic and atraumatic.   Right Ear: External ear normal.   Left Ear: External ear normal.   Nose: Nose normal.   Morbidly obese.   Eyes: EOM are normal. Right eye exhibits no discharge. Left eye exhibits no discharge.   Neck: Normal range of motion. Neck supple.   Cardiovascular: Normal rate, regular rhythm, normal heart sounds and intact distal pulses.  Exam reveals no gallop and no friction rub.    No murmur heard.  Pulmonary/Chest: Effort normal and breath sounds normal. No respiratory distress. He has no wheezes. He has no rales. He exhibits no tenderness.   Good air entry.  Some decreased sounds likely secondary to body habitus.  No wheezes on exam.   Abdominal: Soft. Bowel sounds are normal. He exhibits no distension and no mass. There is no tenderness. There is no rebound and no guarding.   Musculoskeletal: Normal range of motion.   Neurological: He is alert and oriented to person, place, and time.   Skin: Skin is warm and dry. No rash noted. He is not diaphoretic. No erythema. No pallor.   Psychiatric: He has a normal mood and affect. His behavior is normal.   Nursing note and vitals reviewed.      DIAGNOSTIC DATA:   Lab Results (last 24 hours)     Procedure Component Value Units Date/Time    CBC & Differential [[id number]] Collected:  02/28/18 1924    Specimen:  Blood Updated:  02/28/18 1934    Narrative:       The following orders were created for panel order CBC & Differential.  Procedure                               Abnormality         Status                     ---------                               -----------         ------                     CBC Auto Differential[[id number]]         Abnormal            Final result                 Please view results for these tests on the individual orders.    CBC Auto Differential [[id number]]  (Abnormal) Collected:  02/28/18 1924    Specimen:  Blood Updated:  02/28/18 1934     WBC 7.00 10*3/mm3      RBC 4.93 10*6/mm3      Hemoglobin 13.5 (L) g/dL      Hematocrit 40.9 %      MCV 83.0 fL      MCH 27.4 pg      MCHC 33.0 g/dL      RDW 14.0 %      RDW-SD 42.1 fl      MPV 8.8 fL      Platelets 186 10*3/mm3      Neutrophil % 67.2 %      Lymphocyte % 19.9 %      Monocyte % 7.6 %      Eosinophil % 3.3 %      Basophil % 0.3 %      Immature Grans % 1.7 (H) %      Neutrophils, Absolute 4.71 10*3/mm3      Lymphocytes, Absolute 1.39 10*3/mm3      Monocytes, Absolute 0.53 10*3/mm3      Eosinophils, Absolute 0.23 10*3/mm3      Basophils, Absolute 0.02 10*3/mm3      Immature Grans, Absolute 0.12 (H) 10*3/mm3     CK [[id number]]  (Abnormal) Collected:  02/28/18 1924    Specimen:  Blood Updated:  02/28/18 1944     Creatine Kinase 197 (H) U/L     Comprehensive Metabolic Panel [[id number]]  (Abnormal) Collected:  02/28/18 1924    Specimen:  Blood Updated:  02/28/18 1951     Glucose 425 (C) mg/dL      BUN 14 mg/dL      Creatinine 0.74 mg/dL      Sodium 134 (L) mmol/L      Potassium 4.4 mmol/L      Chloride 94 (L) mmol/L      CO2 28.0 mmol/L      Calcium 9.2 mg/dL      Total Protein 7.9 g/dL      Albumin 4.00 g/dL      ALT (SGPT) 60 U/L      AST (SGOT) 28 U/L      Alkaline Phosphatase 89 U/L      Total Bilirubin 0.3 mg/dL      eGFR Non African Amer 118 mL/min/1.73      Globulin 3.9 (H) gm/dL      A/G Ratio 1.0 (L) g/dL      BUN/Creatinine Ratio 18.9     Anion Gap 12.0 mmol/L     CK-MB [[id number]]  (Normal) Collected:  02/28/18 1924    Specimen:  Blood Updated:  02/28/18 1955     CKMB 2.18 ng/mL     Troponin [[id number]]  (Normal) Collected:  02/28/18 1924    Specimen:  Blood Updated:  02/28/18 1955     Troponin I <0.012 ng/mL     BNP [[id number]]  (Normal) Collected:  02/28/18 1924     Specimen:  Blood Updated:  02/28/18 1956     proBNP 66.2 pg/mL     Protime-INR [[id number]]  (Normal) Collected:  02/28/18 1924    Specimen:  Blood Updated:  02/28/18 1958     Protime 12.0 Seconds      INR 0.90    Narrative:       Therapeutic range for most indications is 2.0-3.0 INR,  or 2.5-3.5 for mechanical heart valves.    D-dimer, Quantitative [[id number]]  (Normal) Collected:  02/28/18 1924    Specimen:  Blood Updated:  02/28/18 2027     D-Dimer, Quantitative 290 ng/mL (FEU)     Narrative:       Dimer values <500 ng/ml FEU are FDA approved as aid in diagnosis of deep venous thrombosis and pulmonary embolism.  This test should not be used in an exclusion strategy with pretest probability alone.    A recent guideline regarding diagnosis for pulmonary thomboembolism recommends an adjusted exclusion criterion of age x 10 ng/ml FEU for patients >50 years of age ([employer]ori Intern Med 2015; 163: 701-711).    Urinalysis With / Culture If Indicated - Urine, Clean Catch [[id number]]  (Abnormal) Collected:  02/28/18 1954    Specimen:  Urine from Urine, Clean Catch Updated:  02/28/18 2028     Color, UA Yellow     Appearance, UA Clear     pH, UA 6.5     Specific Gravity, UA 1.032 (H)      Result obtained by Refractometer        Glucose, UA >=1000 mg/dL (3+) (A)     Ketones, UA Negative     Bilirubin, UA Negative     Blood, UA Negative     Protein, UA Trace (A)     Leuk Esterase, UA Negative     Nitrite, UA Negative     Urobilinogen, UA 1.0 E.U./dL    Narrative:       Urine microscopic not indicated.    aPTT [[id number]]  (Normal) Collected:  02/28/18 1924    Specimen:  Blood Updated:  02/28/18 2029     PTT 30.5 seconds     Narrative:       The recommended Heparin therapeutic range is 68-97 seconds.    [place] Draw [[id number]] Collected:  02/28/18 1924    Specimen:  Blood Updated:  02/28/18 2031    Narrative:       The following orders were created for panel order [place] Draw.  Procedure                               Abnormality          Status                     ---------                               -----------         ------                     Light Blue Top[[id number]]                                   Final result               Green Top (Gel)[[id number]]                                  Final result               Lavender Top[[id number]]                                     Final result               Gold Top - SST[[id number]]                                   Final result                 Please view results for these tests on the individual orders.    Light Blue Top [[id number]] Collected:  02/28/18 1924    Specimen:  Blood Updated:  02/28/18 2031     Extra Tube hold for add-on      Auto resulted       Green Top (Gel) [[id number]] Collected:  02/28/18 1924    Specimen:  Blood Updated:  02/28/18 2031     Extra Tube Hold for add-ons.      Auto resulted.       Lavender Top [[id number]] Collected:  02/28/18 1924    Specimen:  Blood Updated:  02/28/18 2031     Extra Tube hold for add-on      Auto resulted       Gold Top - SST [[id number]] Collected:  02/28/18 1924    Specimen:  Blood Updated:  02/28/18 2031     Extra Tube Hold for add-ons.      Auto resulted.           Estimated Creatinine Clearance: 246.4 mL/min (by C-G formula based on Cr of 0.74).     Imaging Results (last 24 hours)     Procedure Component Value Units Date/Time    XR Chest 1 View [[id number]] Collected:  02/28/18 1930     Updated:  02/28/18 1957    Narrative:         [facility]    Patient Name: KEARA ESCOBAR    ORDERING: MAGED MONTEMAYOR    ATTENDING: ERIC HARRY     REFERRING: MAGED MONTEMAYOR  -----------------------    PROCEDURE: CR chest one view    INDICATION: Chest pain    TECHNIQUE: Single AP portable view of the chest    COMPARISON: 2/27/2018    FINDINGS:   No focal air space opacity, pleural effusion or pneumothorax.  Stable borderline enlargement of the cardiac silhouette. The  pulmonary vasculature is within normal limits . The bones  are  unremarkable.        Impression:       Stable borderline cardiomegaly. No radiographic evidence of an  acute cardiopulmonary process.    Electronically signed by:  Deedee Alvarado MD  2/28/2018 7:56  PM CST Workstation: [id number]          I reviewed the patient's new clinical results.    ASSESSMENT AND PLAN: This is a 37 y.o. 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       1. Chest pain.  Recurrent.  Plan to consult patient's primary cardiologist.  Anticipation is that maybe a left heart catheter.  As such patient will be started on a heparin drip for anticoagulation.  Patient states that he has not had his Xarelto. for the last 36 hours at this point.  2. DM. Basal + SSI. Patient NPO as such started him on lower dosage than his home. Enzmyes trended.  Patient's troponin <0.012 x2.  3. Chronic PE. Heparin  4. Morbid obesity. Dietary  5. Chronic pain.  6. Factor II deficiency. Heparin.  7. HLD. Statin restarted once diet restarted.    Will monitor patient's hospital course and adjust treatment as hospital course dictates.    DVT prophylaxis: Heparin  Code status is Full Code      I discussed the patients findings and my recommendations with patient, family and nursing staff.           This document has been electronically signed by Yared Mcnulty MD on February 28, 2018 10:07 PM

## 2018-03-01 NOTE — CONSULTS
"Adult Nutrition  Assessment    Patient Name:  Yordy Hansen  YOB: 1980  MRN: [id number]  Admit Date:  2/28/2018    Assessment Date:  3/1/2018    Comments:  Pt known to our services from [facility] admits.  He is @ 272% of his IBW with a BMI of 65.27 which is compatible with morbid obesity.  He has received edcation multiple times in the past and even once last month.  He declines verbal education.  Rd will provide diet copies on discharge.  Dietary compliance encouraged.            Reason for Assessment       03/01/18 1315    Reason for Assessment    Reason For Assessment/Visit identified at risk by screening criteria    Identified At Risk By Screening Criteria BMI;diagnosis    Cardiac CHF                Nutrition/Diet History       03/01/18 1315    Nutrition/Diet History    Typical Food/Fluid Intake Pt reports that he has not quesetions.  He has received diet education numerous times.              Anthropometrics       03/01/18 1316    Anthropometrics    Height 180.3 cm (71\")    Weight (!)  212 kg (468 lb)    Anthropometrics (Special Considerations)    RD Calculated     RD Calculated %     Ideal Body Weight (IBW)    Ideal Body Weight (IBW), Male (kg) 79.27    % Ideal Body Weight 268.34    Body Mass Index (BMI)    BMI (kg/m2) 65.41    BMI Grade greater than 40 - obesity grade III              Physical Findings       03/01/18 1315    Physical Findings/Assessment    Additional Documentation Physical Appearance (Group)    Physical Appearance    Overall Physical Appearance obese;overweight                  Electronically signed by:  Chiquita Radford RD  03/01/18 1:20 PM  "

## 2018-03-02 VITALS
HEART RATE: 75 BPM | DIASTOLIC BLOOD PRESSURE: 84 MMHG | OXYGEN SATURATION: 94 % | SYSTOLIC BLOOD PRESSURE: 168 MMHG | WEIGHT: 315 LBS | TEMPERATURE: 96.8 F | HEIGHT: 71 IN | BODY MASS INDEX: 44.1 KG/M2 | RESPIRATION RATE: 20 BRPM

## 2018-03-02 LAB
ANION GAP SERPL CALCULATED.3IONS-SCNC: 12 MMOL/L (ref 5–15)
BASOPHILS # BLD AUTO: 0.02 10*3/MM3 (ref 0–0.2)
BASOPHILS NFR BLD AUTO: 0.3 % (ref 0–2)
BUN BLD-MCNC: 10 MG/DL (ref 7–21)
BUN/CREAT SERPL: 17.2 (ref 7–25)
CALCIUM SPEC-SCNC: 9 MG/DL (ref 8.4–10.2)
CHLORIDE SERPL-SCNC: 98 MMOL/L (ref 95–110)
CO2 SERPL-SCNC: 26 MMOL/L (ref 22–31)
CREAT BLD-MCNC: 0.58 MG/DL (ref 0.7–1.3)
DEPRECATED RDW RBC AUTO: 45.3 FL (ref 35.1–43.9)
EOSINOPHIL # BLD AUTO: 0.33 10*3/MM3 (ref 0–0.7)
EOSINOPHIL NFR BLD AUTO: 4.2 % (ref 0–7)
ERYTHROCYTE [DISTWIDTH] IN BLOOD BY AUTOMATED COUNT: 14.6 % (ref 11.5–14.5)
GFR SERPL CREATININE-BSD FRML MDRD: 156 ML/MIN/1.73 (ref 70–162)
GLUCOSE BLD-MCNC: 238 MG/DL (ref 60–100)
GLUCOSE BLDC GLUCOMTR-MCNC: 248 MG/DL (ref 70–130)
GLUCOSE BLDC GLUCOMTR-MCNC: 273 MG/DL (ref 70–130)
HCT VFR BLD AUTO: 40.3 % (ref 39–49)
HGB BLD-MCNC: 13.2 G/DL (ref 13.7–17.3)
IMM GRANULOCYTES # BLD: 0.12 10*3/MM3 (ref 0–0.02)
IMM GRANULOCYTES NFR BLD: 1.5 % (ref 0–0.5)
LYMPHOCYTES # BLD AUTO: 1.42 10*3/MM3 (ref 0.6–4.2)
LYMPHOCYTES NFR BLD AUTO: 17.9 % (ref 10–50)
MCH RBC QN AUTO: 27.6 PG (ref 26.5–34)
MCHC RBC AUTO-ENTMCNC: 32.8 G/DL (ref 31.5–36.3)
MCV RBC AUTO: 84.3 FL (ref 80–98)
MONOCYTES # BLD AUTO: 0.86 10*3/MM3 (ref 0–0.9)
MONOCYTES NFR BLD AUTO: 10.8 % (ref 0–12)
NEUTROPHILS # BLD AUTO: 5.19 10*3/MM3 (ref 2–8.6)
NEUTROPHILS NFR BLD AUTO: 65.3 % (ref 37–80)
NRBC BLD MANUAL-RTO: 0 /100 WBC (ref 0–0)
PLATELET # BLD AUTO: 189 10*3/MM3 (ref 150–450)
PMV BLD AUTO: 8.4 FL (ref 8–12)
POTASSIUM BLD-SCNC: 4.2 MMOL/L (ref 3.5–5.1)
RBC # BLD AUTO: 4.78 10*6/MM3 (ref 4.37–5.74)
SODIUM BLD-SCNC: 136 MMOL/L (ref 137–145)
WBC NRBC COR # BLD: 7.94 10*3/MM3 (ref 3.2–9.8)

## 2018-03-02 PROCEDURE — 63710000001 AMLODIPINE 10 MG TABLET: Performed by: INTERNAL MEDICINE

## 2018-03-02 PROCEDURE — 63710000001 INSULIN ASPART PER 5 UNITS: Performed by: INTERNAL MEDICINE

## 2018-03-02 PROCEDURE — A9270 NON-COVERED ITEM OR SERVICE: HCPCS | Performed by: INTERNAL MEDICINE

## 2018-03-02 PROCEDURE — 63710000001 CLOPIDOGREL 75 MG TABLET: Performed by: INTERNAL MEDICINE

## 2018-03-02 PROCEDURE — 82962 GLUCOSE BLOOD TEST: CPT

## 2018-03-02 PROCEDURE — 85025 COMPLETE CBC W/AUTO DIFF WBC: CPT | Performed by: FAMILY MEDICINE

## 2018-03-02 PROCEDURE — 63710000001 RANOLAZINE 500 MG TABLET SUSTAINED-RELEASE 12 HOUR: Performed by: INTERNAL MEDICINE

## 2018-03-02 PROCEDURE — 25010000002 HYDRALAZINE PER 20 MG: Performed by: INTERNAL MEDICINE

## 2018-03-02 PROCEDURE — 63710000001 ISOSORBIDE MONONITRATE 60 MG TABLET SUSTAINED-RELEASE 24 HOUR: Performed by: INTERNAL MEDICINE

## 2018-03-02 PROCEDURE — 96376 TX/PRO/DX INJ SAME DRUG ADON: CPT

## 2018-03-02 PROCEDURE — 63710000001 LISINOPRIL 40 MG TABLET: Performed by: INTERNAL MEDICINE

## 2018-03-02 PROCEDURE — 80048 BASIC METABOLIC PNL TOTAL CA: CPT | Performed by: FAMILY MEDICINE

## 2018-03-02 PROCEDURE — 99225 PR SBSQ OBSERVATION CARE/DAY 25 MINUTES: CPT | Performed by: INTERNAL MEDICINE

## 2018-03-02 PROCEDURE — G0378 HOSPITAL OBSERVATION PER HR: HCPCS

## 2018-03-02 RX ORDER — AMLODIPINE BESYLATE 10 MG/1
10 TABLET ORAL
Status: DISCONTINUED | OUTPATIENT
Start: 2018-03-02 | End: 2018-03-02 | Stop reason: HOSPADM

## 2018-03-02 RX ADMIN — AMLODIPINE BESYLATE 10 MG: 10 TABLET ORAL at 08:20

## 2018-03-02 RX ADMIN — RANOLAZINE 1000 MG: 500 TABLET, FILM COATED, EXTENDED RELEASE ORAL at 08:19

## 2018-03-02 RX ADMIN — CLOPIDOGREL BISULFATE 75 MG: 75 TABLET ORAL at 08:19

## 2018-03-02 RX ADMIN — INSULIN ASPART 8 UNITS: 100 INJECTION, SOLUTION INTRAVENOUS; SUBCUTANEOUS at 11:26

## 2018-03-02 RX ADMIN — LISINOPRIL 40 MG: 40 TABLET ORAL at 08:20

## 2018-03-02 RX ADMIN — INSULIN ASPART 5 UNITS: 100 INJECTION, SOLUTION INTRAVENOUS; SUBCUTANEOUS at 08:19

## 2018-03-02 RX ADMIN — Medication 10 MG: at 04:45

## 2018-03-02 RX ADMIN — ISOSORBIDE MONONITRATE 120 MG: 60 TABLET, EXTENDED RELEASE ORAL at 08:20

## 2018-03-02 NOTE — PROGRESS NOTES
"Cardiovascular Daily Progress Note--SIGN OFF  Antony Jenkins M.D., Ph.D., [facility]      Subjective     Interval History:   [facility] with patent LAD stent and no other epicardial disease    Review of Systems   Cardiovascular: Positive for chest pain and dyspnea on exertion. Negative for claudication, cyanosis, irregular heartbeat, leg swelling, orthopnea and syncope.   Respiratory: Positive for shortness of breath and snoring. Negative for cough and hemoptysis.        Objective     Vital Sign Min/Max for last 24 hours  Temp  Min: 96.5 °F (35.8 °C)  Max: 98.1 °F (36.7 °C)   BP  Min: 133/73  Max: 185/80   Pulse  Min: 69  Max: 86   Resp  Min: 20  Max: 22   SpO2  Min: 92 %  Max: 95 %   Flow (L/min)  Min: 3  Max: 3   Weight  Min: 212 kg (468 lb)  Max: 212 kg (468 lb)     Flowsheet Rows         First Filed Value    Admission Height  180.3 cm (71\") Documented at 02/28/2018 1859    Admission Weight  (!)  220 kg (484 lb) Documented at 02/28/2018 1859        Last 3 weights    02/28/18  1859 02/28/18  1940 03/01/18  1316   Weight: (!) 220 kg (484 lb) (!) 212 kg (468 lb) (!) 212 kg (468 lb)     Body mass index is 65.27 kg/(m^2).    Physical Exam:  Vitals:    03/02/18 0441   BP: (!) 182/80   Pulse:    Resp:    Temp:    SpO2:      Body mass index is 65.27 kg/(m^2).       Pulse Ox: Normal                   General: alert, appears stated age and cooperative  Body Habitus: super obese  HEENT: Head: Normocephalic, no lesions, without obvious abnormality. No arcus senilis, xanthelasma or xanthomas.  JVP: 8 cm of water at 45 degrees                  Volume/Pulsation:              Normal  Carotid Exam: normal pulsation bilaterally               Carotid Volume: {Desc;  Chest:  Normal                     Excursion: Good                    I:E: normal  Pulmonary:Normal                                                             Precordium: Normal impulses. P2 is not palpable.   [place]:  normal size and placement              Palpable S4: " absent.  Heart rate: normal    Heart Rhythm: regular                                                                   Heart Sounds: S1: normal intensity            S2: increased intensity, increased A2  S3: absent                                                             S4: absent  Opening Snap: absent                    A2-OS:  absent.   Pericardial rub: absent                                           Ejection click: None                                                                                Murmurs:  absent   Extremity: moves all extremities equally.        DATA REVIEWED:    Lab Results (last 24 hours)     Procedure Component Value Units Date/Time    POC Glucose Once [[id number]]  (Abnormal) Collected:  03/01/18 0759    Specimen:  Blood Updated:  03/01/18 0821     Glucose 168 (H) mg/dL       Sliding Scale AdminMeter: [id number]: [id number] CHAKA WAGONER       aPTT [[id number]]  (Normal) Collected:  03/01/18 0946    Specimen:  Blood Updated:  03/01/18 1010     PTT 32.7 seconds     Narrative:       The recommended Heparin therapeutic range is 68-97 seconds.    POC Glucose Once [[id number]]  (Abnormal) Collected:  03/01/18 1048    Specimen:  Blood Updated:  03/01/18 1107     Glucose 206 (H) mg/dL       RN NotifiedMeter: [id number]: [id number] BOWEN KNAPP       POC Glucose Once [[id number]]  (Abnormal) Collected:  03/01/18 1642    Specimen:  Blood Updated:  03/01/18 1655     Glucose 198 (H) mg/dL       RN NotifiedMeter: [id number]: [id number] BOWEN KNAPP       aPTT [[id number]]  (Normal) Collected:  03/01/18 1629    Specimen:  Blood Updated:  03/01/18 1713     PTT 20.3 seconds     Narrative:       The recommended Heparin therapeutic range is 68-97 seconds.    POC Glucose Once [[id number]]  (Abnormal) Collected:  03/01/18 2006    Specimen:  Blood Updated:  03/01/18 2027     Glucose 284 (H) mg/dL       RN NotifiedMeter: [id number]: [id number] RYLIE MILLER        CBC & Differential [[id number]] Collected:  03/02/18 0530    Specimen:  Blood Updated:  03/02/18 0629    Narrative:       The following orders were created for panel order CBC & Differential.  Procedure                               Abnormality         Status                     ---------                               -----------         ------                     CBC Auto Differential[[id number]]        Abnormal            Final result                 Please view results for these tests on the individual orders.    CBC Auto Differential [[id number]]  (Abnormal) Collected:  03/02/18 0530    Specimen:  Blood Updated:  03/02/18 0629     WBC 7.94 10*3/mm3      RBC 4.78 10*6/mm3      Hemoglobin 13.2 (L) g/dL      Hematocrit 40.3 %      MCV 84.3 fL      MCH 27.6 pg      MCHC 32.8 g/dL      RDW 14.6 (H) %      RDW-SD 45.3 (H) fl      MPV 8.4 fL      Platelets 189 10*3/mm3      Neutrophil % 65.3 %      Lymphocyte % 17.9 %      Monocyte % 10.8 %      Eosinophil % 4.2 %      Basophil % 0.3 %      Immature Grans % 1.5 (H) %      Neutrophils, Absolute 5.19 10*3/mm3      Lymphocytes, Absolute 1.42 10*3/mm3      Monocytes, Absolute 0.86 10*3/mm3      Eosinophils, Absolute 0.33 10*3/mm3      Basophils, Absolute 0.02 10*3/mm3      Immature Grans, Absolute 0.12 (H) 10*3/mm3      nRBC 0.0 /100 WBC     Basic Metabolic Panel [[id number]]  (Abnormal) Collected:  03/02/18 0530    Specimen:  Blood Updated:  03/02/18 0630     Glucose 238 (H) mg/dL      BUN 10 mg/dL      Creatinine 0.58 (L) mg/dL      Sodium 136 (L) mmol/L      Potassium 4.2 mmol/L      Chloride 98 mmol/L      CO2 26.0 mmol/L      Calcium 9.0 mg/dL      eGFR Non African Amer 156 mL/min/1.73      BUN/Creatinine Ratio 17.2     Anion Gap 12.0 mmol/L         Imaging Results (last 24 hours)     Procedure Component Value Units Date/Time    IR Insert Midline Without Port Pump 5 Plus [[id number]] Resulted:  03/01/18 1325     Updated:  03/01/18 1325    Narrative:       This procedure  was auto-finalized with no dictation required.    US Guided Vascular Access [[id number]] Resulted:  03/01/18 1326     Updated:  03/01/18 1326    Narrative:       This procedure was auto-finalized with no dictation required.            Assessment/Plan      1. Non-cardiac CP. Invasive coronary angiography revealed patent LAD stent and no evidence of other epicardial disease.  The patient CTA prior to his most recent presentation showed no dissection and no evidence of PE.  I do not have a good etiology for the patient's essentially chronic chest pain complaints.  However, it certainly is not cardiac.  I've also discussed with her my concerns that some of his chest discomfort may be related to his body habitus.  I strongly recommended consideration for bariatric intervention at this point.  I also urged CPAP compliance.  -Will continue the patient's home medications for his cardiovascular disease  -Recommended he speak with a bariatric surgeon as an outpatient  -Recommend close follow-up with sleep medicine and CPAP compliance  -Will sign off.  He may keep his next scheduled appointment in my office    2.  Hypertension, not currently controlled.  -I will reorder all his home medications  -Further titration per the hospitalist service    3. PE.   -Restart Xarelto and follow up with CT surgery    -Will sign off.          This document has been electronically signed by Antony Jenkins MD PhD on March 2, 2018 6:58 AM

## 2018-03-02 NOTE — PLAN OF CARE
Problem: Patient Care Overview (Adult)  Goal: Plan of Care Review  Outcome: Ongoing (interventions implemented as appropriate)   03/02/18 0331   Coping/Psychosocial Response Interventions   Plan Of Care Reviewed With patient   Patient Care Overview   Progress no change   Outcome Evaluation   Outcome Summary/Follow up Plan vss, pain is controlled with meds, no c/o chest pain, ambulated in [place]      Goal: Adult Individualization and Mutuality  Outcome: Ongoing (interventions implemented as appropriate)    Goal: Discharge Needs Assessment  Outcome: Ongoing (interventions implemented as appropriate)      Problem: Pain, Acute (Adult)  Goal: Acceptable Pain Control/Comfort Level  Outcome: Ongoing (interventions implemented as appropriate)      Problem: Respiratory Insufficiency (Adult)  Goal: Identify Related Risk Factors and Signs and Symptoms  Outcome: Ongoing (interventions implemented as appropriate)    Goal: Acid/Base Balance  Outcome: Ongoing (interventions implemented as appropriate)    Goal: Effective Ventilation  Outcome: Ongoing (interventions implemented as appropriate)

## 2018-03-02 NOTE — PLAN OF CARE
Problem: Patient Care Overview (Adult)  Goal: Plan of Care Review  Outcome: Ongoing (interventions implemented as appropriate)   03/02/18 1004   Coping/Psychosocial Response Interventions   Plan Of Care Reviewed With patient   Patient Care Overview   Progress improving   Outcome Evaluation   Outcome Summary/Follow up Plan no c/o pain at this time. No acute changes.     Goal: Adult Individualization and Mutuality  Outcome: Ongoing (interventions implemented as appropriate)    Goal: Discharge Needs Assessment  Outcome: Ongoing (interventions implemented as appropriate)      Problem: Pain, Acute (Adult)  Goal: Acceptable Pain Control/Comfort Level  Outcome: Ongoing (interventions implemented as appropriate)      Problem: Respiratory Insufficiency (Adult)  Goal: Identify Related Risk Factors and Signs and Symptoms  Outcome: Outcome(s) achieved Date Met: 03/02/18    Goal: Acid/Base Balance  Outcome: Ongoing (interventions implemented as appropriate)    Goal: Effective Ventilation  Outcome: Ongoing (interventions implemented as appropriate)

## 2018-03-02 NOTE — PLAN OF CARE
Problem: Patient Care Overview (Adult)  Goal: Plan of Care Review  Outcome: Outcome(s) achieved Date Met: 03/02/18 03/02/18 1152   Outcome Evaluation   Outcome Summary/Follow up Plan pt to be discharged     Goal: Adult Individualization and Mutuality  Outcome: Outcome(s) achieved Date Met: 03/02/18    Goal: Discharge Needs Assessment  Outcome: Outcome(s) achieved Date Met: 03/02/18      Problem: Pain, Acute (Adult)  Goal: Acceptable Pain Control/Comfort Level  Outcome: Outcome(s) achieved Date Met: 03/02/18      Problem: Respiratory Insufficiency (Adult)  Goal: Acid/Base Balance  Outcome: Outcome(s) achieved Date Met: 03/02/18    Goal: Effective Ventilation  Outcome: Outcome(s) achieved Date Met: 03/02/18

## 2018-03-02 NOTE — DISCHARGE SUMMARY
[address] [address]  T - [phone]     DISCHARGE SUMMARY         PATIENT  DEMOGRAPHICS   PATIENT NAME: Yordy Hansen                      PHYSICIAN: Gonsalo Hogue MD  : 1980  MRN: [id number]    ADMISSION/DISCHARGE INFO   ADMISSION DATE: 2018   DISCHARGE DATE: 3/2/2018    ADMISSION DIAGNOSES: Dyspnea, unspecified type [R06.00]  Chest pain, unspecified type [R07.9]  DISCHARGE DIAGNOSES:     Principal Problem:    Precordial pain  Active Problems:    Class 3 obesity due to excess calories with serious comorbidity and body mass index (BMI) of 60.0 to 69.9 in adult    Chest pain    Chronic back pain    Essential hypertension    Chronic pulmonary embolism    Factor II deficiency      SERVICE:  Medicine       CONSULTS   Consult Orders (all)     Start     Ordered    18 06  Inpatient Cardiology Consult  Once     Comments:  Will call in am.   Specialty:  Cardiology  Provider:  Antony Jenkins MD PhD    18 0625          PROCEDURES     Imaging Results (last 24 hours)     Procedure Component Value Units Date/Time    IR Insert Midline Without Port Pump 5 Plus [[id number]] Resulted:  18     Updated:  18    Narrative:       This procedure was auto-finalized with no dictation required.    US Guided Vascular Access [[id number]] Resulted:  18     Updated:  18    Narrative:       This procedure was auto-finalized with no dictation required.          Xr Chest 2 View    Result Date: 2018  Narrative: EXAM:          Radiograph(s), Chest VIEWS:    PA / Lat ; 2     DATE/TIME:  2018 8:43 PM CST            INDICATION:   chest pain  COMPARISON:  CXR: 18         FINDINGS:         - lines/tubes:    none   - cardiac:         size within normal limits       - mediastinum: contour within normal limits       - lungs:         no focal air space process, pulmonary interstitial edema, nodule(s)/mass            - pleura:         no evidence of  fluid                - osseous:         unremarkable for age                - misc.:        Impression: CONCLUSION:    1. No evidence of an acute cardiopulmonary process.                                  Electronically signed by:  MIGUEL A Soni MD  2/9/2018 8:44 PM CST Workstation: [id number]    Ct Head Without Contrast    Result Date: 2/14/2018  Narrative: CT head without contrast on  2/14/2018 CLINICAL INDICATION: Headache, hypertensive urgency TECHNIQUE: Multiple axial images are obtained throughout the head without the administration of contrast. This study was performed with techniques to keep radiation doses as low as reasonably achievable, (ALARA). Total DLP is 971.1 mGy*cm. COMPARISON: 2/9/2018 FINDINGS:   There is no hydrocephalus. There is no CT evidence of acute infarct. There is no hemorrhage. There are no abnormal extra-axial fluid collections. There is no mass, mass effect or midline shift. No bony abnormality is noted.     Impression: No acute intracranial abnormality. Electronically signed by:  Pelon Anderson  2/14/2018 10:48 PM CST Workstation: [id number]    Ct Head Without Contrast    Result Date: 2/9/2018  Narrative: Exam: Head CT without contrast INDICATION: Headache TECHNIQUE: Routine head CT without contrast. Sagittal coronal reconstructions were obtained. CT technique performed using ALARA. COMPARISON: 9/12/2014 FINDINGS: The bony calvarium is intact. The imaged paranasal sinuses mastoid air cells are clear. No extra-axial fluid collection. The ventricular system is normal. Normal gray-white differentiation. No sign of acute infarction. No intraparenchymal hemorrhage, mass or midline shift.     Impression: No acute intracranial abnormality. Electronically signed by:  Brennan Corrigan MD  2/9/2018 10:03 PM CST Workstation: [id number]    Us Abdomen Complete    Result Date: 2/14/2018  Narrative: ULTRASOUND OF THE ABDOMEN HISTORY: Splenomegaly.  Hepatomegaly. Ultrasound examination of the abdomen was performed. COMPARISON: February 9, 2016. Correlation CT January 26, 2018. Liver 24.2 cm x 23.9 cm x 17.5 cm. Fatty infiltration of the liver. No focal intrahepatic lesion. The gallbladder has been removed. Common bile duct is within normal limits at 5.2 mm. Normal pancreas. Normal kidneys. Spleen 17.07 cm x 8.11 cm x 7.32 cm with a volume of 530.60 mL. Unremarkable IVC. No abdominal aortic aneurysm. No free fluid.     Impression: CONCLUSION: Hepatosplenomegaly. Fatty infiltration of the liver. Cholecystectomy. [id number] Electronically signed by:  Kulwant Currie MD  2/14/2018 10:01 AM CST Workstation: [id number]    Us Guided Vascular Access    Result Date: 3/1/2018  Narrative: This procedure was auto-finalized with no dictation required.    Xr Chest 1 View    Result Date: 2/28/2018  Narrative: [facility] Patient Name: KEARA ESCOBAR ORDERING: MAGED MONTEMAYOR ATTENDING: ERIC HARRY REFERRING: MAGED MONTEMAYOR ----------------------- PROCEDURE: CR chest one view INDICATION: Chest pain TECHNIQUE: Single AP portable view of the chest COMPARISON: 2/27/2018 FINDINGS: No focal air space opacity, pleural effusion or pneumothorax. Stable borderline enlargement of the cardiac silhouette. The pulmonary vasculature is within normal limits . The bones are unremarkable.     Impression: Stable borderline cardiomegaly. No radiographic evidence of an acute cardiopulmonary process. Electronically signed by:  Deedee Alvarado MD  2/28/2018 7:56 PM CST Workstation: [id number]    Us Venous Doppler Lower Extremity Bilateral (duplex)    Result Date: 2/15/2018  Narrative: Procedure: Bilateral lower extremity venous ultrasound CLINICAL INDICATION: swelling, R07.9 Chest pain, unspecified I16.0 Hypertensive urgency COMPARISON: None FINDINGS: The common femoral,  femoral,  popliteal and calf veins of the bilateral  lower extremity are well identified and compress  normally.  Doppler signals are heard either spontaneously or on distal compression.  No evidence of intraluminal thrombus was noted.     Impression: CONCLUSION:  No evidence of deep venous thrombosis in the common femoral, femoral, popliteal and calf veins of the bilateral lower extremity. Electronically signed by:  Alvaro Ojeda MD  2/15/2018 9:44 AM CST Workstation: [id number]    Ir Insert Midline Without Port Pump 5 Plus    Result Date: 3/1/2018  Narrative: This procedure was auto-finalized with no dictation required.    Ct Angiogram Chest With Contrast    Result Date: 2/14/2018  Narrative: CT angiogram chest with contrast on 2/14/2018 CLINICAL INDICATION: Shortness of breath, history of pulmonary embolus TECHNIQUE: Multiple axial images are obtained throughout the chest following the administration of IV contrast.  Computer generated 3D reconstructions/MIPS were performed. This study was performed with techniques to keep radiation doses as low as reasonably achievable, (ALARA). Total DLP is 1211.7 mGy*cm. COMPARISON: 1/2/2018 FINDINGS: Bolus timing is significantly suboptimal for evaluation of pulmonary embolus. No gross large or central filling defect is noted but cannot exclude pulmonary emboli on this exam. Examination is also limited by streak artifact from patient's body habitus. There is no aortic aneurysm or dissection. There is no pleural or pericardial effusion. Limited visualized upper abdomen is unremarkable. There is no thoracic adenopathy. The lungs are clear. Couple of old left rib fractures are noted. No acute bony abnormality is noted.     Impression: 1. Bolus timing is markedly suboptimal for evaluation of pulmonary embolus with no evidence of a gross large or central pulmonary embolus but cannot otherwise exclude pulmonary emboli on this exam. 2. Otherwise essentially unremarkable. Electronically signed by:  Pelon Anderson  2/14/2018 10:54 PM CST Workstation: [id number]    Xr Chest Pa &  Lateral    Result Date: 2/27/2018  Narrative: Chest 2 view on  2/27/2018 CLINICAL INDICATION: Chest pain, shortness of breath COMPARISON: 2/9/2018 FINDINGS: Heart is upper limits normal for size. The lungs are clear. Hilar and mediastinal contours are within normal limits. No bony abnormality is noted.     Impression: No acute disease. Electronically signed by:  Pelon Anderson  2/27/2018 2:14 AM [facility] Workstation: [id number]      HISTORY OF PRESENT ILLNESS   Yordy Hansen is a 37 y.o. male who presented with a recent chest pain.  He was admitted and discharge from our facility recently for same reason.  Patient was then presented having chest pain again.  His enzymes were negative.  However he was further evaluated by cardiology recommended to have cardiac catheter to rule out cardiac etiology.    DIAGNOSTIC DATA   Labs  Images      HOSPITAL COURSE   Patient was admitted to the medical floor, was admitted by Dr. Jenkins.  He recommend for patient to have cardiac catheter to rule out cardiac etiology for his repeated chest pain admissions.  Patient was taken to catheter lab and had his cardiac catheter done, cath did not show any acute coronary artery disease which required stenting.  Patient had a previous stent that was an LAD and was patent.  Patient ambulated in the hallway without having any chest pain.  I had a long discussion with him regarding bariatric surgery and possible evaluation at [facility].  Patient is currently stable, vitals are stable.  He will be going home to have outpatient follow-up.  Patient is instructed to start his metformin 2 days after his cardiac catheter.    Physical Exam   Constitutional: He is oriented to person, place, and time. He appears well-developed and well-nourished.   HENT:   Head: Normocephalic and atraumatic.   Eyes: EOM are normal. Pupils are equal, round, and reactive to light.   Neck: Normal range of motion.   Cardiovascular: Normal rate,  regular rhythm and normal heart sounds.    Pulmonary/Chest: Effort normal and breath sounds normal.   Abdominal: Soft. Bowel sounds are normal.   Musculoskeletal: Normal range of motion.   Neurological: He is alert and oriented to person, place, and time.   Skin: Skin is warm.   Psychiatric: He has a normal mood and affect. His behavior is normal.   Vitals reviewed.         DISCHARGE CONDITION   Stable    DISPOSITION   To Home      DISCHARGE MEDICATIONS      Yordy Hansen   Home Medication Instructions ROCKY:[id number]    Printed on:03/02/18 4128   Medication Information                      albuterol (ACCUNEB) 1.25 MG/3ML nebulizer solution  Take 3 mL by nebulization Every 6 (Six) Hours As Needed for Wheezing.             albuterol (PROVENTIL HFA;VENTOLIN HFA) 108 (90 BASE) MCG/ACT inhaler  Inhale 2 puffs Every 4 (Four) Hours As Needed for Wheezing.             amLODIPine (NORVASC) 10 MG tablet  Take 1 tablet by mouth Every Night.             aspirin 81 MG EC tablet  Take 1 tablet by mouth Daily.             clopidogrel (PLAVIX) 75 MG tablet  Take 1 tablet by mouth Daily.             glucose blood test strip  One touch Verio strips 4x daily test PRN for blood sugar             glucose monitor monitoring kit  1 each As Needed (check blood glucose 3x daily).             HYDROcodone-acetaminophen (NORCO)  MG per tablet  Take 1 tablet by mouth Every 6 (Six) Hours As Needed for Moderate Pain .             insulin aspart (novoLOG) 100 UNIT/ML injection  Inject 15 Units under the skin 3 (Three) Times a Day Before Meals.             insulin detemir (LEVEMIR) 100 UNIT/ML injection  Inject 35 Units under the skin Every Night.             isosorbide mononitrate (IMDUR) 120 MG 24 hr tablet  TAKE ONE TABLET BY MOUTH ONCE DAILY             linagliptin (TRADJENTA) 5 MG tablet tablet  Take 1 tablet by mouth Daily.             lisinopril (PRINIVIL,ZESTRIL) 40 MG tablet  Take 1 tablet by mouth Every Morning.            "  meclizine (ANTIVERT) 25 MG tablet  Take 1 tablet by mouth 3 (Three) Times a Day As Needed for dizziness.             metFORMIN (GLUCOPHAGE) 1000 MG tablet  Take 1 tablet by mouth 2 (Two) Times a Day With Meals.             metoprolol succinate XL (TOPROL XL) 200 MG 24 hr tablet  Take 1 tablet by mouth Daily.             MICROLET LANCETS misc  One touch delica lancets             nitroglycerin (NITROSTAT) 0.4 MG SL tablet  Place 1 tablet under the tongue Every 5 (Five) Minutes As Needed for Chest Pain. Take no more than 3 doses in 15 minutes.             ondansetron ODT (ZOFRAN-ODT) 4 MG disintegrating tablet  Take 1 tablet by mouth Every 6 (Six) Hours As Needed for Nausea or Vomiting.             pantoprazole (PROTONIX) 40 MG EC tablet  Take 1 tablet by mouth Every Night.             pramipexole (MIRAPEX) 1 MG tablet  Take 2 tablets by mouth Every Night. Taking 2mg daily             pravastatin (PRAVACHOL) 80 MG tablet  Take 1 tablet by mouth Every Night.             ranolazine (RANEXA) 1000 MG 12 hr tablet  Take 1 tablet by mouth Every 12 (Twelve) Hours.             RELION INSULIN SYRINGE 31G X 15/64\" 0.5 ML misc               rivaroxaban (XARELTO) 20 MG tablet  Take 1 tablet by mouth Daily.             sertraline (ZOLOFT) 25 MG tablet  Take 1 tablet by mouth Daily.             traZODone (DESYREL) 300 MG tablet  Take 1 tablet by mouth Every Night.                   INSTRUCTIONS   Activity:   Activity Instructions     Discharge Activity                     Diet:   Diet Instructions     Diet: Cardiac, Consistent Carbohydrate       Discharge Diet:   Cardiac  Consistent Carbohydrate                    Special Instructions: Patient instructed to call M.D. or return to ED with worsening shortness of breath, chest pain, fever greater than 100.4°F or any other medical concerns.    FOLLOW UP   Follow-up Information     Follow up with LALA Barajas .    Specialty:  Nurse Practitioner    Contact information:    [address] [facility] [address]  [phone]          Follow up with Antony Jenkins MD PhD Follow up in 2 week(s).    Specialties:  Cardiology, Interventional Radiology    Contact information:    [address] [place] [address]  [address]  [facility] [address] [phone]          Follow up with Melchor Woodward MD Follow up in 2 week(s).    Specialties:  General Surgery, Bariatrics    Why:  Bariatrics surgery evaluation     Contact information:    [address] [place] [address] [id number] [phone]           PENDING TEST RESULTS AT DISCHARGE      TIME   Time: 25 minutes were spent planning this discharge.                      This document has been electronically signed by Gonsalo Hogue MD on March 2, 2018 5:21 PM

## 2018-03-07 ENCOUNTER — OFFICE VISIT (OUTPATIENT)
Dept: FAMILY MEDICINE CLINIC | Facility: CLINIC | Age: 38
End: 2018-03-07

## 2018-03-07 VITALS
TEMPERATURE: 98 F | OXYGEN SATURATION: 98 % | HEIGHT: 71 IN | SYSTOLIC BLOOD PRESSURE: 158 MMHG | DIASTOLIC BLOOD PRESSURE: 78 MMHG | HEART RATE: 96 BPM | WEIGHT: 315 LBS | BODY MASS INDEX: 44.1 KG/M2 | RESPIRATION RATE: 24 BRPM

## 2018-03-07 DIAGNOSIS — I25.10 CORONARY ARTERY DISEASE INVOLVING NATIVE CORONARY ARTERY OF NATIVE HEART WITHOUT ANGINA PECTORIS: ICD-10-CM

## 2018-03-07 DIAGNOSIS — I27.82 OTHER CHRONIC PULMONARY EMBOLISM WITHOUT ACUTE COR PULMONALE (HCC): Chronic | ICD-10-CM

## 2018-03-07 DIAGNOSIS — E66.01 MORBID OBESITY (HCC): ICD-10-CM

## 2018-03-07 DIAGNOSIS — E11.65 TYPE 2 DIABETES MELLITUS WITH HYPERGLYCEMIA, WITHOUT LONG-TERM CURRENT USE OF INSULIN (HCC): ICD-10-CM

## 2018-03-07 DIAGNOSIS — E11.8 TYPE 2 DIABETES MELLITUS WITH COMPLICATION, WITHOUT LONG-TERM CURRENT USE OF INSULIN (HCC): ICD-10-CM

## 2018-03-07 DIAGNOSIS — I10 UNCONTROLLED HYPERTENSION: Primary | ICD-10-CM

## 2018-03-07 PROCEDURE — 99213 OFFICE O/P EST LOW 20 MIN: CPT | Performed by: NURSE PRACTITIONER

## 2018-03-08 ENCOUNTER — OFFICE VISIT (OUTPATIENT)
Dept: CARDIAC SURGERY | Facility: CLINIC | Age: 38
End: 2018-03-08

## 2018-03-08 ENCOUNTER — LAB (OUTPATIENT)
Dept: LAB | Facility: HOSPITAL | Age: 38
End: 2018-03-08

## 2018-03-08 ENCOUNTER — OFFICE VISIT (OUTPATIENT)
Dept: PULMONOLOGY | Facility: CLINIC | Age: 38
End: 2018-03-08

## 2018-03-08 VITALS
DIASTOLIC BLOOD PRESSURE: 94 MMHG | HEIGHT: 71 IN | SYSTOLIC BLOOD PRESSURE: 191 MMHG | BODY MASS INDEX: 44.1 KG/M2 | WEIGHT: 315 LBS | HEART RATE: 113 BPM | OXYGEN SATURATION: 95 %

## 2018-03-08 VITALS
SYSTOLIC BLOOD PRESSURE: 186 MMHG | HEIGHT: 71 IN | WEIGHT: 315 LBS | HEART RATE: 113 BPM | BODY MASS INDEX: 44.1 KG/M2 | DIASTOLIC BLOOD PRESSURE: 81 MMHG | OXYGEN SATURATION: 95 %

## 2018-03-08 DIAGNOSIS — G47.33 OSA ON CPAP: ICD-10-CM

## 2018-03-08 DIAGNOSIS — Z99.89 OSA ON CPAP: ICD-10-CM

## 2018-03-08 DIAGNOSIS — R07.1 CHEST PAIN ON BREATHING: ICD-10-CM

## 2018-03-08 DIAGNOSIS — D68.2 FACTOR II DEFICIENCY (HCC): ICD-10-CM

## 2018-03-08 DIAGNOSIS — M79.605 PAIN IN BOTH LOWER EXTREMITIES: ICD-10-CM

## 2018-03-08 DIAGNOSIS — I10 UNCONTROLLED HYPERTENSION: ICD-10-CM

## 2018-03-08 DIAGNOSIS — IMO0001 CLASS 3 OBESITY DUE TO EXCESS CALORIES WITH SERIOUS COMORBIDITY AND BODY MASS INDEX (BMI) OF 60.0 TO 69.9 IN ADULT: ICD-10-CM

## 2018-03-08 DIAGNOSIS — Z86.711 HISTORY OF PULMONARY EMBOLUS (PE): Primary | ICD-10-CM

## 2018-03-08 DIAGNOSIS — R06.09 DYSPNEA ON EXERTION: Primary | ICD-10-CM

## 2018-03-08 DIAGNOSIS — M79.604 PAIN IN BOTH LOWER EXTREMITIES: ICD-10-CM

## 2018-03-08 DIAGNOSIS — I27.82 OTHER CHRONIC PULMONARY EMBOLISM WITHOUT ACUTE COR PULMONALE (HCC): Chronic | ICD-10-CM

## 2018-03-08 PROBLEM — R07.81 PLEURITIC CHEST PAIN: Status: ACTIVE | Noted: 2017-03-25

## 2018-03-08 PROBLEM — R51.9 HEADACHE: Status: ACTIVE | Noted: 2018-03-08

## 2018-03-08 PROBLEM — J81.1 PULMONARY EDEMA: Status: RESOLVED | Noted: 2018-03-08 | Resolved: 2018-03-08

## 2018-03-08 PROBLEM — J45.909 RAD (REACTIVE AIRWAY DISEASE): Status: ACTIVE | Noted: 2018-03-08

## 2018-03-08 PROBLEM — R11.0 CHRONIC NAUSEA: Status: ACTIVE | Noted: 2018-03-08

## 2018-03-08 PROBLEM — E11.8 TYPE 2 DIABETES MELLITUS WITH COMPLICATION, WITHOUT LONG-TERM CURRENT USE OF INSULIN (HCC): Status: ACTIVE | Noted: 2017-12-21

## 2018-03-08 PROBLEM — M79.606 LOWER EXTREMITY PAIN: Status: ACTIVE | Noted: 2018-03-08

## 2018-03-08 PROBLEM — G44.209 TENSION TYPE HEADACHE: Status: ACTIVE | Noted: 2018-03-08

## 2018-03-08 PROBLEM — J44.9 COPD (CHRONIC OBSTRUCTIVE PULMONARY DISEASE): Status: ACTIVE | Noted: 2018-03-08

## 2018-03-08 PROBLEM — G24.01 TARDIVE DYSKINESIA: Status: ACTIVE | Noted: 2018-03-08

## 2018-03-08 PROBLEM — Z86.79 HISTORY OF CORONARY ARTERY DISEASE: Status: ACTIVE | Noted: 2018-03-08

## 2018-03-08 PROBLEM — G89.29 CHRONIC ABDOMINAL PAIN: Status: ACTIVE | Noted: 2018-03-08

## 2018-03-08 PROBLEM — H57.89 EYE IRRITATION: Status: ACTIVE | Noted: 2018-03-08

## 2018-03-08 PROBLEM — Z95.5 STATUS POST CORONARY ARTERY STENT PLACEMENT: Status: ACTIVE | Noted: 2018-03-08

## 2018-03-08 PROBLEM — R10.9 CHRONIC ABDOMINAL PAIN: Status: ACTIVE | Noted: 2018-03-08

## 2018-03-08 PROBLEM — I20.8 ANGINA AT REST (HCC): Status: ACTIVE | Noted: 2017-11-18

## 2018-03-08 PROBLEM — E66.9 OBESITY: Status: ACTIVE | Noted: 2018-03-08

## 2018-03-08 PROBLEM — S91.319A FOOT LACERATION: Status: ACTIVE | Noted: 2018-03-08

## 2018-03-08 PROBLEM — I16.0 HYPERTENSIVE URGENCY: Status: ACTIVE | Noted: 2018-03-08

## 2018-03-08 PROBLEM — J45.52 SEVERE PERSISTENT ASTHMA WITH STATUS ASTHMATICUS: Status: ACTIVE | Noted: 2017-09-08

## 2018-03-08 PROBLEM — D64.9 ANEMIA: Status: ACTIVE | Noted: 2018-03-08

## 2018-03-08 PROBLEM — Z86.79 HISTORY OF CHF (CONGESTIVE HEART FAILURE): Status: ACTIVE | Noted: 2018-03-08

## 2018-03-08 PROBLEM — E66.01 MORBID OBESITY: Status: ACTIVE | Noted: 2018-03-08

## 2018-03-08 PROBLEM — J81.1 PULMONARY EDEMA: Status: ACTIVE | Noted: 2018-03-08

## 2018-03-08 LAB
DEPRECATED RDW RBC AUTO: 40.6 FL (ref 35.1–43.9)
ERYTHROCYTE [DISTWIDTH] IN BLOOD BY AUTOMATED COUNT: 13.8 % (ref 11.5–14.5)
HCT VFR BLD AUTO: 41.2 % (ref 39–49)
HGB BLD-MCNC: 14.2 G/DL (ref 13.7–17.3)
MCH RBC QN AUTO: 27.8 PG (ref 26.5–34)
MCHC RBC AUTO-ENTMCNC: 34.5 G/DL (ref 31.5–36.3)
MCV RBC AUTO: 80.6 FL (ref 80–98)
PLATELET # BLD AUTO: 221 10*3/MM3 (ref 150–450)
PMV BLD AUTO: 9.4 FL (ref 8–12)
RBC # BLD AUTO: 5.11 10*6/MM3 (ref 4.37–5.74)
WBC NRBC COR # BLD: 13.33 10*3/MM3 (ref 3.2–9.8)

## 2018-03-08 PROCEDURE — G8417 CALC BMI ABV UP PARAM F/U: HCPCS | Performed by: INTERNAL MEDICINE

## 2018-03-08 PROCEDURE — G9903 PT SCRN TBCO ID AS NON USER: HCPCS | Performed by: INTERNAL MEDICINE

## 2018-03-08 PROCEDURE — 36415 COLL VENOUS BLD VENIPUNCTURE: CPT

## 2018-03-08 PROCEDURE — 85027 COMPLETE CBC AUTOMATED: CPT

## 2018-03-08 PROCEDURE — 99214 OFFICE O/P EST MOD 30 MIN: CPT | Performed by: NURSE PRACTITIONER

## 2018-03-08 PROCEDURE — G0447 BEHAVIOR COUNSEL OBESITY 15M: HCPCS | Performed by: INTERNAL MEDICINE

## 2018-03-08 PROCEDURE — 99204 OFFICE O/P NEW MOD 45 MIN: CPT | Performed by: INTERNAL MEDICINE

## 2018-03-08 RX ORDER — FUROSEMIDE 40 MG/1
TABLET ORAL
COMMUNITY
Start: 2018-03-07 | End: 2018-03-19

## 2018-03-08 RX ORDER — GUAIFENESIN 600 MG/1
600 TABLET, EXTENDED RELEASE ORAL 2 TIMES DAILY
Qty: 60 TABLET | Refills: 2 | Status: SHIPPED | OUTPATIENT
Start: 2018-03-08 | End: 2018-03-19

## 2018-03-08 NOTE — PATIENT INSTRUCTIONS
Vascular tests:  Normal blood flow to both feet   Follow up for anticoagulation unless PCP or other service is managing.

## 2018-03-08 NOTE — PROGRESS NOTES
Pulmonary Consultation    Antony Jenkins M.D. PhD,    Thank you for asking me to see Yordy Hansen for   Chief Complaint   Patient presents with   • Shortness of Breath     ref by Dr. Jenkins   .    Subjective     History of Present Illness  Yordy Hansen is a 37 y.o. male with a PMH significant for morbid obesity, ADOLFO on CPAP, chronic PE, ASCAD s/p PCI to LAD, hypertension, and diabetes who presents for evaluation of dyspnea. Pt states he was referred by Dr. Jenkins due to history of BABCOCK. 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 Pt is on Xarelto and is followed in the vascular clinic. He does admits to dyspnea at rest as well as BABCOCK. Pt has chronic chest pain and has been admitted multiple times. He had a [facility] last week which showed a patent LAD stent and no new obstruction. Pt went to [facility] last night as his chest pain was severe and he was checked out. He reports he has a long standing history of asthma for which he takes albuterol prn. Pt has not needed his albuterol in some time. He tried it with his current issues and did not get relief. Pt admits to compliance with his CPAP and he is scheduled to see Dr. Montelongo soon. He admits to some variation in his weight recently, but he has had a gradual increase in his weight over many years. Pt admits to some left leg edema. He states he is supposed to meet with the bariatric surgeon in [place] later this month. He smoked briefly as a teenager, but nothing significant. Pt has worked as a . His mother has COPD, and his father had DVTs.     Review of Systems: History obtained from chart review and the patient.  Review of Systems   Constitutional: Positive for fatigue. Negative for fever and unexpected weight change.   Respiratory: Positive for chest tightness and shortness of breath. Negative for wheezing.    Cardiovascular: Positive for chest pain and leg swelling. Negative for palpitations.    Gastrointestinal: Negative for abdominal pain.   Musculoskeletal: Positive for arthralgias.     As described in the HPI. Otherwise, remainder of ROS (14 systems) were negative.    Patient Active Problem List   Diagnosis   • Mixed hyperlipidemia   • Class 3 obesity due to excess calories with serious comorbidity and body mass index (BMI) of 60.0 to 69.9 in adult   • ADOLFO on CPAP   • Pleuritic chest pain   • Back pain   • GERD (gastroesophageal reflux disease)   • Uncontrolled hypertension   • RLS (restless legs syndrome)   • Seizure   • Chronic pulmonary embolism   • Factor II deficiency   • Epigastric pain   • Shortness of breath   • Coronary artery disease involving native coronary artery of native heart without angina pectoris   • Type 2 diabetes mellitus with complication, without long-term current use of insulin   • Candidal intertrigo   • Gastrointestinal hemorrhage associated with anorectal source   • Bacterial cystitis   • Precordial chest pain   • Anemia   • Angina at rest   • Chronic abdominal pain   • Chronic nausea   • Eye irritation   • Foot laceration   • Headache   • History of CHF (congestive heart failure)   • History of coronary artery disease   • Status post coronary artery stent placement   • History of pulmonary embolus (PE)   • Hypertensive urgency   • Lower extremity pain   • Mood disorder   • Morbid obesity   • Morbid obesity due to excess calories   • Morbid obesity with BMI of 60.0-69.9, adult   • Obesity   • Tardive dyskinesia   • Tension type headache         Current Outpatient Prescriptions:   •  albuterol (ACCUNEB) 1.25 MG/3ML nebulizer solution, Take 3 mL by nebulization Every 6 (Six) Hours As Needed for Wheezing., Disp: 100 vial, Rfl: 0  •  albuterol (PROVENTIL HFA;VENTOLIN HFA) 108 (90 BASE) MCG/ACT inhaler, Inhale 2 puffs Every 4 (Four) Hours As Needed for Wheezing., Disp: , Rfl:   •  amLODIPine (NORVASC) 10 MG tablet, Take 1 tablet by mouth Every Night., Disp: 30 tablet, Rfl: 5  •   aspirin 81 MG EC tablet, Take 1 tablet by mouth Daily., Disp: 30 tablet, Rfl: 0  •  clopidogrel (PLAVIX) 75 MG tablet, Take 1 tablet by mouth Daily., Disp: 30 tablet, Rfl: 3  •  furosemide (LASIX) 40 MG tablet, DAILY, Disp: , Rfl:   •  glucose blood test strip, One touch Verio strips 4x daily test PRN for blood sugar, Disp: 100 each, Rfl: 6  •  glucose monitor monitoring kit, 1 each As Needed (check blood glucose 3x daily)., Disp: 1 each, Rfl: 0  •  HYDROcodone-acetaminophen (NORCO)  MG per tablet, Take 1 tablet by mouth Every 6 (Six) Hours As Needed for Moderate Pain ., Disp: 15 tablet, Rfl: 0  •  insulin aspart (novoLOG) 100 UNIT/ML injection, Inject 15 Units under the skin 3 (Three) Times a Day Before Meals., Disp: 10 mL, Rfl: 3  •  insulin detemir (LEVEMIR) 100 UNIT/ML injection, Inject 35 Units under the skin Every Night., Disp: 10 mL, Rfl: 3  •  isosorbide mononitrate (IMDUR) 120 MG 24 hr tablet, TAKE ONE TABLET BY MOUTH ONCE DAILY, Disp: 30 tablet, Rfl: 0  •  linagliptin (TRADJENTA) 5 MG tablet tablet, Take 1 tablet by mouth Daily., Disp: 30 tablet, Rfl: 1  •  lisinopril (PRINIVIL,ZESTRIL) 40 MG tablet, Take 1 tablet by mouth Every Morning., Disp: 30 tablet, Rfl: 5  •  meclizine (ANTIVERT) 25 MG tablet, Take 1 tablet by mouth 3 (Three) Times a Day As Needed for dizziness., Disp: 30 tablet, Rfl: 0  •  metFORMIN (GLUCOPHAGE) 1000 MG tablet, Take 1 tablet by mouth 2 (Two) Times a Day With Meals., Disp: 60 tablet, Rfl: 2  •  metoprolol succinate XL (TOPROL XL) 200 MG 24 hr tablet, Take 1 tablet by mouth Daily., Disp: 31 tablet, Rfl: 6  •  MICROLET LANCETS misc, One touch delica lancets, Disp: 100 each, Rfl: 5  •  nitroglycerin (NITROSTAT) 0.4 MG SL tablet, Place 1 tablet under the tongue Every 5 (Five) Minutes As Needed for Chest Pain. Take no more than 3 doses in 15 minutes., Disp: 100 tablet, Rfl: 12  •  ondansetron ODT (ZOFRAN-ODT) 4 MG disintegrating tablet, Take 1 tablet by mouth Every 6 (Six) Hours As  "Needed for Nausea or Vomiting., Disp: 10 tablet, Rfl: 0  •  pantoprazole (PROTONIX) 40 MG EC tablet, Take 1 tablet by mouth Every Night., Disp: 30 tablet, Rfl: 5  •  pramipexole (MIRAPEX) 1 MG tablet, Take 2 tablets by mouth Every Night. Taking 2mg daily, Disp: 60 tablet, Rfl: 5  •  pravastatin (PRAVACHOL) 80 MG tablet, Take 1 tablet by mouth Every Night., Disp: 30 tablet, Rfl: 5  •  ranolazine (RANEXA) 1000 MG 12 hr tablet, Take 1 tablet by mouth Every 12 (Twelve) Hours., Disp: 60 tablet, Rfl: 5  •  RELION INSULIN SYRINGE 31G X 15/64\" 0.5 ML misc, , Disp: , Rfl:   •  rivaroxaban (XARELTO) 20 MG tablet, Take 1 tablet by mouth Daily., Disp: 30 tablet, Rfl: 4  •  sertraline (ZOLOFT) 25 MG tablet, Take 1 tablet by mouth Daily., Disp: 30 tablet, Rfl: 5  •  traZODone (DESYREL) 300 MG tablet, Take 1 tablet by mouth Every Night., Disp: 30 tablet, Rfl: 5    Past Medical History:   Diagnosis Date   • Chest pain    • Chronic back pain    • Coronary artery disease    • Diabetes mellitus    • GERD (gastroesophageal reflux disease)    • Hyperlipidemia    • Hypertension    • Morbid obesity    • Pulmonary embolism    • RLS (restless legs syndrome)    • Seizures    • Sleep apnea      Past Surgical History:   Procedure Laterality Date   • ADENOIDECTOMY     • CARDIAC CATHETERIZATION N/A 3/15/2017    Procedure: Left Heart Cath;  Surgeon: Edwige Briceno MD;  Location: [facility] INVASIVE LOCATION;  Service:    • CARDIAC CATHETERIZATION N/A 3/15/2017    Procedure: Stent SOPHIA coronary;  Surgeon: Edwige Briceno MD;  Location: [facility] CATH INVASIVE LOCATION;  Service:    • CARDIAC CATHETERIZATION N/A 3/1/2018    Procedure: Left Heart Cath;  Surgeon: Conor Parsons MD;  Location: [facility] CATH INVASIVE LOCATION;  Service:    • CHOLECYSTECTOMY     • CORONARY ANGIOPLASTY WITH STENT PLACEMENT     • VA RT/LT HEART CATHETERS N/A 3/15/2017    Procedure: Percutaneous Coronary Intervention;  Surgeon: Edwgie Briceno MD;  Location: [facility] INVASIVE " "LOCATION;  Service: Cardiovascular   • TONSILLECTOMY     • TRANSESOPHAGEAL ECHOCARDIOGRAM (MONICA)       Social History     Social History   • Marital status:      Spouse name: Cori   • Number of children: 0     Occupational History   • Disabled      Social History Main Topics   • Smoking status: Former Smoker     Quit date: 1997   • Smokeless tobacco: Current User     Types: Snuff   • Alcohol use No   • Drug use: No   • Sexual activity: Yes     Partners: Female     Birth control/ protection: None     Family History   Problem Relation Age of Onset   • Heart disease Mother    • Hypertension Mother    • Hyperlipidemia Mother    • Coronary artery disease Mother    • Cancer Paternal Grandfather           Objective     Blood pressure (!) 191/94, pulse 113, height 180.3 cm (71\"), weight (!) 216 kg (476 lb 14.4 oz), SpO2 95 %.  Physical Exam   Constitutional: He is oriented to person, place, and time. Vital signs are normal. He appears well-developed and well-nourished.   Morbidly obese   HENT:   Head: Normocephalic and atraumatic.   Nose: Nose normal.   Mouth/Throat: Uvula is midline, oropharynx is clear and moist and mucous membranes are normal.   Mallampati 2   Eyes: Conjunctivae, EOM and lids are normal. Pupils are equal, round, and reactive to light.   Neck: Trachea normal and normal range of motion. No tracheal tenderness present. No thyroid mass present.   Large neck circumference   Cardiovascular: Regular rhythm.  Tachycardia present.  PMI is not displaced.  Exam reveals distant heart sounds. Exam reveals no gallop.    No murmur heard.  Pulmonary/Chest: Effort normal and breath sounds normal. Tachypnea noted. No respiratory distress. He has no decreased breath sounds. He has no wheezes. He has no rhonchi. He exhibits no tenderness.   Abdominal: Soft. Normal appearance and bowel sounds are normal. There is no hepatosplenomegaly. There is no tenderness.   Morbidly obese   Musculoskeletal:   Normal gait, L>R LE " edema       Vascular Status -  His exam exhibits right foot edema. His exam exhibits left foot edema.  Lymphadenopathy:        Head (right side): No submandibular adenopathy present.        Head (left side): No submandibular adenopathy present.     He has no cervical adenopathy.        Right: No supraclavicular adenopathy present.        Left: No supraclavicular adenopathy present.   Neurological: He is alert and oriented to person, place, and time. Gait normal.   Skin: Skin is warm and dry. No rash noted. No cyanosis. Nails show no clubbing.   Psychiatric: He has a normal mood and affect. His speech is normal and behavior is normal. Judgment normal.   Nursing note and vitals reviewed.      PFTs: 9/11/17; 1/4/18 (independently reviewed and interpreted by me)  Ratio 82  FVC 2.87/ 52%  FEV1 2.37/ 54%  TLC 3.16/ 44%  DLCO 40.76/ 121%  Moderate restriction with no bronchodilator response.  Normal diffusing capacity.  No comparative data available.    Radiology (independently reviewed and interpreted by me): CXR 2/28/18 showed stable borderline cardiomegaly, NACPD    CTPA  2/14/18: No PE, NACPD    TTE 3/1/18:  · The quality of the study is limited due to poor acoustic windows related to patient body habitus. Note also that contrast was used to enhance ejection fraction.  · Left Ventricle: Left ventricular systolic function is normal. Estimated EF appears to be in the range of greater than 70%.  · Left ventricular wall thickness is consistent with moderate-to-severe concentric hypertrophy  · Right Ventricle: Normal right ventricular cavity size, wall thickness, systolic function and septal motion noted.  · No valvular abnormalities given the limitations of the study.  · There is no evidence of pericardial effusion       Assessment/Plan     Yordy was seen today for shortness of breath.    Diagnoses and all orders for this visit:    Dyspnea on exertion    Chest pain on breathing    Class 3 obesity due to excess calories  with serious comorbidity and body mass index (BMI) of 60.0 to 69.9 in adult    ADOLFO on CPAP    Uncontrolled hypertension    Other chronic pulmonary embolism without acute cor pulmonale         Discussion/ Recommendations:   PFTs show restriction which is secondary to the patient's underlying morbid obesity.  Chest imaging has been unremarkable for parenchymal disease.  I do not think she has underlying affective lung disease such as asthma as he has not perceived any benefit from use of albuterol which I would expect at least partial relief of his symptoms with underlying obstruction.  I think most of his current complaints are related to his super morbid obesity as well as musculoskeletal chest pain.  He has had an excessive number of CT's without significant change.  Given that he did have a history of PE, I think it is worth continuing his anticoagulation as there is no current contraindication.  He had a recent left heart catheter which did not show any new obstructive coronary disease and his prior LAD stent remains patent.  I think it is in the patient's best interest to proceed with an evaluation for bariatric surgery given his multiple underlying comorbidities which are made worse by his ongoing obesity.    -No indication for bronchodilators.  -Recommend conservative treatment of probable musculoskeletal chest pain with acetaminophen as needed as well as local heat.  -Continue Xarelto.  -Continue CPAP and establish with Dr. Montelongo.  -Monitor BP and f/u with PCP.  -Counseled the patient extensively on importance of diet as well as exercise to assist with weight loss.  Given his morbid obesity as well as prior attempts at weight loss and diet, I think it is reasonable for him to pursue bariatric surgery.  He is at slightly increased risk for complication due to some of his underlying coronary disease, but in the end, his obesity is the greatest issue.  Spent 16 minutes counseling on obesity.    Based on  spirometry, imaging, and exam I think it is reasonable for the patient to undergo general anesthesia, with the following precautions:  -Extubation immediately to CPAP or BiPAP  -Early ambulation and incentive spirometry while in bed  -Heparin gtt pre/ post op until Xarelto can be restarted  -Continue outpatient bronchodilators   -Wear CPAP with sleep      Patient's BMI is above normal parameters. Follow-up plan includes:  exercise counseling, nutrition counseling and referral to primary care.           Return if symptoms worsen or fail to improve.      Thank you for allowing me to participate in the care of Yordy Estrada Hansen. Please do not hesitate to contact me with any questions.         This document has been electronically signed by Parul Currie MD on March 8, 2018 11:08 AM      Dictated using Dragon

## 2018-03-08 NOTE — PROGRESS NOTES
Chief Complaint   Patient presents with   • Diabetes     checked at 1519  330   • Hosp f/u       Subjective   Yordy Hansen is a 37 y.o. male presents to the office for hospital f/u and re-evaluation of T2DM.    PMH  Cardiology:CAD   Takes daily plavix   He is followed by Dr. Jenkins and Yolanda Bautista, [facility]- cardiology. Previously  followed by Dr. Boone. He had a cardiac stent placed in July 2016 and another  placed in 03/2017.     Recent PE 12/2017- was previously on xarelto, insurance would not cover and was  switched to eliquis. Followed by coumadin clinic. Now back on lifelong xarelto- if  not covered, will be placed on coumadin    T2DM- poorly controlled: managed with metformin and tradjenta. recently started on  levemir and novolog    Increasing novolog to 20 units pre-meal today for any BG >150   novolog SS provided today      Increasing levemir to 40 units Q HS. Patient to increase by 5 units each week until  goal am FBG of 130-150 is reached    chronic chest pain- prn nitro, renexa    factor 2 def/ chronic PE.- lifelong anticoagulant, plavix, xarelto     Hyperlipidemia- managed with pravastatin    RLS- managed with mirapex    Insomnia- managed with trazedone    GERD- managed with protonix    HPI   Patient is accompanied by his wife who provides additional history.   Patient was recently d/c from  for chest pain. Complete work-up was completed including heart cath- all labs and tests were negative. He continues to have intermittent chest pain. He took to nitro tabs this morning before this office visit with good relief of chest pain.     No new acute complaints today. However, T2DM remains poorly controlled. FBG at last office visit were in 200. Patient now reports FBG are 400+. He confirms he is following T2DM management plan as previously ordered. No change in diet and medications otherwise.     He c/o fatigue. No diaphoresis, confusion or syncope. Denies polyuria and polyphagia.  Chest pain has  not worsened. No SOA or difficulty breathing.     Hospital records reviewed:  Clean heart cath  Cardiac enzymes WNL  CXR- cardiomegaly      HPI  Family History   Problem Relation Age of Onset   • Heart disease Mother    • Hypertension Mother    • Hyperlipidemia Mother    • Coronary artery disease Mother    • Cancer Paternal Grandfather      Social History     Social History   • Marital status:      Spouse name: Cori   • Number of children: 0   • Years of education: N/A     Occupational History   • Disabled      Social History Main Topics   • Smoking status: Former Smoker     Quit date: 1997   • Smokeless tobacco: Current User     Types: Snuff   • Alcohol use No   • Drug use: No   • Sexual activity: Yes     Partners: Female     Birth control/ protection: None     Other Topics Concern   • Not on file     Social History Narrative       The following portions of the patient's history were reviewed and updated as appropriate: allergies, current medications, past family history, past medical history, past social history, past surgical history and problem list.    Review of Systems   Constitutional: Positive for fatigue. Negative for activity change, appetite change, chills, fever and unexpected weight change.   HENT: Negative.  Negative for congestion, drooling, facial swelling, postnasal drip, rhinorrhea, sinus pressure, sore throat, trouble swallowing and voice change.    Eyes: Negative.  Negative for photophobia, pain, discharge and visual disturbance.   Respiratory: Positive for chest tightness. Negative for apnea, cough, choking and wheezing.    Cardiovascular: Positive for chest pain. Negative for palpitations and leg swelling.   Gastrointestinal: Negative.  Negative for abdominal distention, abdominal pain, constipation, diarrhea, nausea and vomiting.   Endocrine: Negative for polydipsia, polyphagia and polyuria.   Genitourinary: Negative.  Negative for decreased urine volume, difficulty urinating and  "dysuria.   Musculoskeletal: Negative.  Negative for arthralgias, gait problem and myalgias.   Skin: Negative.  Negative for rash and wound.   Allergic/Immunologic: Negative.    Neurological: Negative.  Negative for dizziness, syncope, weakness, light-headedness, numbness and headaches.   Hematological: Negative.    Psychiatric/Behavioral: Negative.  Negative for confusion, decreased concentration and sleep disturbance. The patient is not nervous/anxious.      14 Point ROS completed with pertinent positives discussed. All other systems reviewed and are negative.       Objective   Encounter Vitals  /78  Pulse 96  Temp 98 °F (36.7 °C) (Tympanic)   Resp 24  Ht 180.3 cm (71\")  Wt (!) 216 kg (477 lb)  SpO2 98%  BMI 66.53 kg/m2  Vitals:    03/07/18 1514   BP: 158/78   Pulse: 96   Resp: 24   Temp: 98 °F (36.7 °C)   TempSrc: Tympanic   SpO2: 98%   Weight: (!) 216 kg (477 lb)   Height: 180.3 cm (71\")       Physical Exam   Constitutional: He is oriented to person, place, and time. Vital signs are normal. He appears well-developed and well-nourished.  Non-toxic appearance.   Morbid obesity   HENT:   Head: Normocephalic and atraumatic.   Right Ear: Tympanic membrane, external ear and ear canal normal.   Left Ear: Tympanic membrane, external ear and ear canal normal.   Nose: Nose normal.   Mouth/Throat: Uvula is midline, oropharynx is clear and moist and mucous membranes are normal. No posterior oropharyngeal edema or posterior oropharyngeal erythema.   Eyes: Lids are normal. Pupils are equal, round, and reactive to light.   Neck: Trachea normal and normal range of motion. Neck supple. No thyroid mass present.   Cardiovascular: Normal rate, regular rhythm, S1 normal, S2 normal, normal heart sounds and intact distal pulses.  PMI is displaced.  Exam reveals no gallop and no friction rub.    No murmur heard.  Elevated BP   Pulmonary/Chest: Effort normal. No respiratory distress. He has decreased breath sounds. He has no " wheezes. He has no rhonchi. He has no rales.   Abdominal: Soft. Normal appearance and bowel sounds are normal. He exhibits no mass. There is no rebound and no guarding.   Musculoskeletal: Normal range of motion.   Lymphadenopathy:     He has no cervical adenopathy.   Neurological: He is alert and oriented to person, place, and time. He has normal strength. No cranial nerve deficit or sensory deficit. Gait normal.   Skin: Skin is warm, dry and intact. No rash noted.   Psychiatric: He has a normal mood and affect. His speech is normal and behavior is normal. Judgment and thought content normal. Cognition and memory are normal.   Nursing note and vitals reviewed.      Pertinent Labs    Key Imaging/Tracings/POC Testing      Assessment and Medications  Problems Addressed this Visit        Cardiovascular and Mediastinum    Chronic pulmonary embolism (Chronic)    Uncontrolled hypertension - Primary    Coronary artery disease involving native coronary artery of native heart without angina pectoris       Digestive    Morbid obesity       Endocrine    Type 2 diabetes mellitus with complication, without long-term current use of insulin    Relevant Medications    insulin detemir (LEVEMIR) 100 UNIT/ML injection    insulin aspart (novoLOG) 100 UNIT/ML injection      Other Visit Diagnoses     Type 2 diabetes mellitus with hyperglycemia, without long-term current use of insulin        Relevant Medications    insulin detemir (LEVEMIR) 100 UNIT/ML injection    insulin aspart (novoLOG) 100 UNIT/ML injection        Side effects of ordered medications discussed with patient.     Plan/Additional Notes/Instructions  Plan   1. Encouraged pulmonary toiletting, TCDB  2. T2DM uncontrolled- increase in both levemir and novolog with SS added  3. If BG is not controlled with changes made above, I will refer to endocrinology  4. Elevated BP. Will likely discuss HTN management with Yolanda Bautista before making any changes.   5. If chest pain  continues, or if breathing becomes more labored, advised patient he will have to return to ER for further evaluation  6. Will schedule f/u call tomorrow and next week  7. Encouraged keeping bariatric referral.  8. If you experience respiratory distress, chest pain, confusion, syncope, lethargy, or feelings of impending doom, call 911 or have someone drive you to the nearest ER.  9. F/u in 2-4 week to re-assess T2DM and HTN. Will see how his FBG does over the weekend with changes and make appointment after I talk with him on Monday    Follow-Up  Return in about 2 weeks (around 3/21/2018), or if symptoms worsen or fail to improve.    Patient/caregiver verbalizes understanding of all orders and instructions in this plan of care.           This document has been electronically signed by LALA Barajas on March 8, 2018 5:33 PM

## 2018-03-09 NOTE — PROGRESS NOTES
Subjective   Patient ID: Yordy Hansen is a 37 y.o. male is here today for follow-up for follow up anticoagulation, chronic pulmonary embolus and evaluation or PVD.  PCP SP Gonzalez MD    Card Kaity VIDAL   CC:  R side chest pain VAS 4 chronic  BABCOCK   History of Present Illness  History of Present Illness: 37 y.o. male with known CAD SP DUE stent X2 to LAD in June 2016 for which he continues on Plavix which he has tolerated well, he stopped smoking then. He is morbid obesity. He states he is not sedentary, and has no prolonged sitting. 2016 CTA pulmonary demonstrated RLL X 2 partial filling defects Echo: EF 50% Mild MR/TR No RV strain noted. Lower extremity Duplex: No DVT. CVTS saw in hospital for anticoagulation recommendations. He was originally on Coumadin therapy and wished to be switched to NOAC.  Was difficult to achieve therapeutic level.   He was changed to Xarelto. Hypercoagulable panel + Factor 2 deficiency. Was recently admitted to [facility] with CP and chronic dyspnea.  Returns for follow up, post vascular testing Does not feel well today.      03/08/18  THAD:  RIGHT 0.96 Triphasic  LEFT 0.90 Triphasic      The following portions of the patient's history were reviewed and updated as appropriate: allergies, current medications, past family history, past medical history, past social history, past surgical history and problem list. See HPI  Allergies   Allergen Reactions   • Glipizide Other (See Comments)     Slurred Speech   • Reglan [Metoclopramide] Anxiety   • Tramadol Other (See Comments)     seizures   • Risperdal [Risperidone] Other (See Comments)     Slurred speech         Current Outpatient Prescriptions:   •  albuterol (ACCUNEB) 1.25 MG/3ML nebulizer solution, Take 3 mL by nebulization Every 6 (Six) Hours As Needed for Wheezing., Disp: 100 vial, Rfl: 0  •  albuterol (PROVENTIL HFA;VENTOLIN HFA) 108 (90 BASE) MCG/ACT inhaler, Inhale 2 puffs Every 4 (Four) Hours As Needed for Wheezing., Disp: , Rfl:   •   amLODIPine (NORVASC) 10 MG tablet, Take 1 tablet by mouth Every Night., Disp: 30 tablet, Rfl: 5  •  aspirin 81 MG EC tablet, Take 1 tablet by mouth Daily., Disp: 30 tablet, Rfl: 0  •  clopidogrel (PLAVIX) 75 MG tablet, Take 1 tablet by mouth Daily., Disp: 30 tablet, Rfl: 3  •  furosemide (LASIX) 40 MG tablet, DAILY, Disp: , Rfl:   •  glucose blood test strip, One touch Verio strips 4x daily test PRN for blood sugar, Disp: 100 each, Rfl: 6  •  glucose monitor monitoring kit, 1 each As Needed (check blood glucose 3x daily)., Disp: 1 each, Rfl: 0  •  HYDROcodone-acetaminophen (NORCO)  MG per tablet, Take 1 tablet by mouth Every 6 (Six) Hours As Needed for Moderate Pain ., Disp: 15 tablet, Rfl: 0  •  isosorbide mononitrate (IMDUR) 120 MG 24 hr tablet, TAKE ONE TABLET BY MOUTH ONCE DAILY, Disp: 30 tablet, Rfl: 0  •  linagliptin (TRADJENTA) 5 MG tablet tablet, Take 1 tablet by mouth Daily., Disp: 30 tablet, Rfl: 1  •  lisinopril (PRINIVIL,ZESTRIL) 40 MG tablet, Take 1 tablet by mouth Every Morning., Disp: 30 tablet, Rfl: 5  •  meclizine (ANTIVERT) 25 MG tablet, Take 1 tablet by mouth 3 (Three) Times a Day As Needed for dizziness., Disp: 30 tablet, Rfl: 0  •  metFORMIN (GLUCOPHAGE) 1000 MG tablet, Take 1 tablet by mouth 2 (Two) Times a Day With Meals., Disp: 60 tablet, Rfl: 2  •  metoprolol succinate XL (TOPROL XL) 200 MG 24 hr tablet, Take 1 tablet by mouth Daily., Disp: 31 tablet, Rfl: 6  •  MICROLET LANCETS misc, One touch delica lancets, Disp: 100 each, Rfl: 5  •  nitroglycerin (NITROSTAT) 0.4 MG SL tablet, Place 1 tablet under the tongue Every 5 (Five) Minutes As Needed for Chest Pain. Take no more than 3 doses in 15 minutes., Disp: 100 tablet, Rfl: 12  •  ondansetron ODT (ZOFRAN-ODT) 4 MG disintegrating tablet, Take 1 tablet by mouth Every 6 (Six) Hours As Needed for Nausea or Vomiting., Disp: 10 tablet, Rfl: 0  •  pantoprazole (PROTONIX) 40 MG EC tablet, Take 1 tablet by mouth Every Night., Disp: 30 tablet,  "Rfl: 5  •  pramipexole (MIRAPEX) 1 MG tablet, Take 2 tablets by mouth Every Night. Taking 2mg daily, Disp: 60 tablet, Rfl: 5  •  pravastatin (PRAVACHOL) 80 MG tablet, Take 1 tablet by mouth Every Night., Disp: 30 tablet, Rfl: 5  •  ranolazine (RANEXA) 1000 MG 12 hr tablet, Take 1 tablet by mouth Every 12 (Twelve) Hours., Disp: 60 tablet, Rfl: 5  •  RELION INSULIN SYRINGE 31G X 15/64\" 0.5 ML misc, , Disp: , Rfl:   •  rivaroxaban (XARELTO) 20 MG tablet, Take 1 tablet by mouth Daily., Disp: 30 tablet, Rfl: 4  •  sertraline (ZOLOFT) 25 MG tablet, Take 1 tablet by mouth Daily., Disp: 30 tablet, Rfl: 5  •  traZODone (DESYREL) 300 MG tablet, Take 1 tablet by mouth Every Night., Disp: 30 tablet, Rfl: 5  •  guaiFENesin (MUCINEX) 600 MG 12 hr tablet, Take 1 tablet by mouth 2 (Two) Times a Day., Disp: 60 tablet, Rfl: 2  •  insulin aspart (novoLOG) 100 UNIT/ML injection, Inject 20 Units under the skin 3 (Three) Times a Day Before Meals., Disp: 10 mL, Rfl: 3  •  insulin detemir (LEVEMIR) 100 UNIT/ML injection, Inject 40 Units under the skin Every Night., Disp: 10 mL, Rfl: 3    Review of Systems   Constitution: Positive for weight gain. Negative for chills, decreased appetite, fever, weakness and malaise/fatigue.   HENT: Negative for hearing loss, hoarse voice, sore throat and stridor.    Eyes: Negative for visual disturbance.   Cardiovascular: Positive for chest pain (R side constant ached), dyspnea on exertion and leg swelling. Negative for irregular heartbeat and near-syncope.        Chronic chest pain    Respiratory: Positive for cough and shortness of breath. Negative for hemoptysis and wheezing.    Hematologic/Lymphatic: Negative for bleeding problem. Does not bruise/bleed easily.   Skin: Positive for dry skin. Negative for poor wound healing and rash.   Musculoskeletal: Positive for back pain and joint pain. Negative for falls.   Gastrointestinal: Positive for hemorrhoids. Negative for abdominal pain, anorexia, hematemesis " and melena.   Genitourinary: Negative for hematuria.   Neurological: Positive for headaches. Negative for brief paralysis, difficulty with concentration, dizziness, focal weakness, light-headedness, loss of balance, numbness, paresthesias and seizures.   Psychiatric/Behavioral: Negative for altered mental status.   Allergic/Immunologic: Negative for hives.     Constitutional: Negative for activity change, appetite change, chills, fatigue and fever.   Denies use of power tools, electric knives, chain saws, or conttact sports. Shaves with safety razor. Aware to use soft tooth brush and waxed floss.  No recent falls.  Denies: GIB, GUB, or Seizures.   HENT: Negative for congestion, nosebleeds, sore throat and voice change.   Eyes: Negative for visual disturbance.   Respiratory. Negative for cough, choking, shortness of breath, wheezing and stridor.   Cardiovascular: Positive for chest pain (Improved ). Negative for palpitations and leg swelling.   Gastrointestinal: Negative for abdominal pain, blood in stool, constipation, diarrhea and nausea.   Genitourinary: Negative for hematuria.   Musculoskeletal: Negative for back pain and joint swelling.   Skin: Negative for color change, pallor and rash.   Allergic/Immunologic: Negative for environmental allergies.   Neurological: Negative for dizziness, seizures, syncope, weakness, light-headedness, numbness and headaches.   Hematological: Does not bruise/bleed easily.   Psychiatric/Behavioral: The patient is not nervous/anxious.         Objective   Physical Exam   Constitutional: He is oriented to person, place, and time.   Body mass index is 65.55 kg/(m^2).Class III  Disheveled  Mild body odor  Morbid obesity    HENT:   Head: Normocephalic.   Mouth/Throat: Oropharynx is clear and moist.   No erythema or foul breath    Eyes: Conjunctivae are normal. Pupils are equal, round, and reactive to light.   R eye deviated lateral    Neck: Neck supple. No JVD present.   Cardiovascular:  Regular rhythm.  Tachycardia present.    Pulses:       Carotid pulses are 1+ on the right side, and 1+ on the left side.       Radial pulses are 2+ on the right side, and 2+ on the left side.        Dorsalis pedis pulses are 2+ on the right side, and 2+ on the left side.        Posterior tibial pulses are 2+ on the right side, and 2+ on the left side.   Pulmonary/Chest: Effort normal and breath sounds normal. No stridor. No respiratory distress.   Distant    Abdominal: Soft. Bowel sounds are normal.   Obese    Musculoskeletal: He exhibits edema. He exhibits no tenderness.   Neurological: He is alert and oriented to person, place, and time.   Skin: Skin is warm and dry.   Psychiatric: His behavior is normal. Judgment normal.   Nursing note and vitals reviewed.         Vitals:    03/08/18 1131   BP: (!) 186/81   Pulse: 113   SpO2: 95%       Lab Results   Component Value Date    WBC 13.33 (H) 03/08/2018    HGB 14.2 03/08/2018    HCT 41.2 03/08/2018    MCV 80.6 03/08/2018     03/08/2018     Lab Results   Component Value Date    GLUCOSE 238 (H) 03/02/2018    BUN 10 03/02/2018    CREATININE 0.58 (L) 03/02/2018    EGFRIFNONA 156 03/02/2018    BCR 17.2 03/02/2018    K 4.2 03/02/2018    CO2 26.0 03/02/2018    CALCIUM 9.0 03/02/2018    ALBUMIN 4.00 02/28/2018    LABIL2 1.0 (L) 02/28/2018    AST 28 02/28/2018    ALT 60 02/28/2018         Assessment/Plan   Independent Review of Radiographic Studies:    03/08/18  THAD:  RIGHT 0.96 Triphasic  LEFT 0.90 Triphasic     1. Pulmonary embolism on long-term anticoagulation therapy  Tolerating Xarelto well. Continue lifelong.   - rivaroxaban (XARELTO) 20 MG tablet; Take 1 tablet by mouth Daily. (Patient taking differently: Take 20 mg by mouth Daily With Dinner.)      2. Factor II deficiency  See #1  - rivaroxaban (XARELTO) 20 MG tablet; Take 1 tablet by mouth Daily. (Patient taking differently: Take 20 mg by mouth Daily With Dinner.)  Dispense: 30 tablet; Refill: 11    3.  Chronic  CP  PCP manages.   Seen by Dr Currie today     4.  Leukocytosis  Discussed with Dr Currie.  Recently received steroids.     5.  PVD  Normal resting blood flow to feet.    6.  Class III obesity  Patient's BMI is above normal parameters. Follow-up plan includes:  nutrition counseling.  Pt can only tolerate chair exercises becomes BABCOCK with slow walking.  Pt has been referred to Bariatric Surgery.

## 2018-03-10 ENCOUNTER — HOSPITAL ENCOUNTER (EMERGENCY)
Facility: HOSPITAL | Age: 38
Discharge: HOME | End: 2018-03-11
Attending: FAMILY MEDICINE | Admitting: EMERGENCY MEDICINE

## 2018-03-10 DIAGNOSIS — E08.65 DIABETES MELLITUS DUE TO UNDERLYING CONDITION, UNCONTROLLED, WITH HYPERGLYCEMIA, WITH LONG-TERM CURRENT USE OF INSULIN (HCC): Primary | ICD-10-CM

## 2018-03-10 DIAGNOSIS — Z79.4 DIABETES MELLITUS DUE TO UNDERLYING CONDITION, UNCONTROLLED, WITH HYPERGLYCEMIA, WITH LONG-TERM CURRENT USE OF INSULIN (HCC): Primary | ICD-10-CM

## 2018-03-10 PROCEDURE — 96360 HYDRATION IV INFUSION INIT: CPT

## 2018-03-10 PROCEDURE — 96361 HYDRATE IV INFUSION ADD-ON: CPT

## 2018-03-10 PROCEDURE — 99284 EMERGENCY DEPT VISIT MOD MDM: CPT

## 2018-03-11 ENCOUNTER — APPOINTMENT (OUTPATIENT)
Dept: GENERAL RADIOLOGY | Facility: HOSPITAL | Age: 38
End: 2018-03-11

## 2018-03-11 VITALS
HEIGHT: 71 IN | BODY MASS INDEX: 44.1 KG/M2 | OXYGEN SATURATION: 96 % | SYSTOLIC BLOOD PRESSURE: 172 MMHG | RESPIRATION RATE: 18 BRPM | DIASTOLIC BLOOD PRESSURE: 76 MMHG | HEART RATE: 90 BPM | TEMPERATURE: 97.9 F | WEIGHT: 315 LBS

## 2018-03-11 LAB
ACETONE BLD QL: NEGATIVE
ALBUMIN SERPL-MCNC: 3.6 G/DL (ref 3.4–4.8)
ALBUMIN/GLOB SERPL: 1.1 G/DL (ref 1.1–1.8)
ALP SERPL-CCNC: 75 U/L (ref 38–126)
ALT SERPL W P-5'-P-CCNC: 24 U/L (ref 21–72)
ANION GAP SERPL CALCULATED.3IONS-SCNC: 14 MMOL/L (ref 5–15)
AST SERPL-CCNC: 23 U/L (ref 17–59)
BASOPHILS # BLD AUTO: 0.01 10*3/MM3 (ref 0–0.2)
BASOPHILS NFR BLD AUTO: 0.1 % (ref 0–2)
BILIRUB SERPL-MCNC: 0.2 MG/DL (ref 0.2–1.3)
BILIRUB UR QL STRIP: NEGATIVE
BUN BLD-MCNC: 16 MG/DL (ref 7–21)
BUN/CREAT SERPL: 25.8 (ref 7–25)
CALCIUM SPEC-SCNC: 9 MG/DL (ref 8.4–10.2)
CHLORIDE SERPL-SCNC: 93 MMOL/L (ref 95–110)
CLARITY UR: CLEAR
CO2 SERPL-SCNC: 24 MMOL/L (ref 22–31)
COLOR UR: YELLOW
CREAT BLD-MCNC: 0.62 MG/DL (ref 0.7–1.3)
DEPRECATED RDW RBC AUTO: 40.9 FL (ref 35.1–43.9)
EOSINOPHIL # BLD AUTO: 0.25 10*3/MM3 (ref 0–0.7)
EOSINOPHIL NFR BLD AUTO: 3.4 % (ref 0–7)
ERYTHROCYTE [DISTWIDTH] IN BLOOD BY AUTOMATED COUNT: 13.7 % (ref 11.5–14.5)
GFR SERPL CREATININE-BSD FRML MDRD: 144 ML/MIN/1.73 (ref 70–162)
GLOBULIN UR ELPH-MCNC: 3.2 GM/DL (ref 2.3–3.5)
GLUCOSE BLD-MCNC: 504 MG/DL (ref 60–100)
GLUCOSE BLDC GLUCOMTR-MCNC: 314 MG/DL (ref 70–130)
GLUCOSE BLDC GLUCOMTR-MCNC: 469 MG/DL (ref 70–130)
GLUCOSE UR STRIP-MCNC: ABNORMAL MG/DL
HCT VFR BLD AUTO: 38.8 % (ref 39–49)
HGB BLD-MCNC: 13 G/DL (ref 13.7–17.3)
HGB UR QL STRIP.AUTO: NEGATIVE
HOLD SPECIMEN: NORMAL
HOLD SPECIMEN: NORMAL
IMM GRANULOCYTES # BLD: 0.1 10*3/MM3 (ref 0–0.02)
IMM GRANULOCYTES NFR BLD: 1.4 % (ref 0–0.5)
KETONES UR QL STRIP: NEGATIVE
LEUKOCYTE ESTERASE UR QL STRIP.AUTO: NEGATIVE
LIPASE SERPL-CCNC: 75 U/L (ref 23–300)
LYMPHOCYTES # BLD AUTO: 1.7 10*3/MM3 (ref 0.6–4.2)
LYMPHOCYTES NFR BLD AUTO: 23 % (ref 10–50)
MCH RBC QN AUTO: 27.4 PG (ref 26.5–34)
MCHC RBC AUTO-ENTMCNC: 33.5 G/DL (ref 31.5–36.3)
MCV RBC AUTO: 81.7 FL (ref 80–98)
MONOCYTES # BLD AUTO: 0.84 10*3/MM3 (ref 0–0.9)
MONOCYTES NFR BLD AUTO: 11.4 % (ref 0–12)
NEUTROPHILS # BLD AUTO: 4.49 10*3/MM3 (ref 2–8.6)
NEUTROPHILS NFR BLD AUTO: 60.7 % (ref 37–80)
NITRITE UR QL STRIP: NEGATIVE
PH BLDV: 7.37 PH UNITS (ref 7.31–7.42)
PH UR STRIP.AUTO: 6 [PH] (ref 5–9)
PLATELET # BLD AUTO: 188 10*3/MM3 (ref 150–450)
PMV BLD AUTO: 9.1 FL (ref 8–12)
POTASSIUM BLD-SCNC: 4.4 MMOL/L (ref 3.5–5.1)
PROT SERPL-MCNC: 6.8 G/DL (ref 6.3–8.6)
PROT UR QL STRIP: NEGATIVE
RBC # BLD AUTO: 4.75 10*6/MM3 (ref 4.37–5.74)
SODIUM BLD-SCNC: 131 MMOL/L (ref 137–145)
SP GR UR STRIP: 1.03 (ref 1–1.03)
TROPONIN I SERPL-MCNC: <0.012 NG/ML
UROBILINOGEN UR QL STRIP: ABNORMAL
WBC NRBC COR # BLD: 7.39 10*3/MM3 (ref 3.2–9.8)
WHOLE BLOOD HOLD SPECIMEN: NORMAL
WHOLE BLOOD HOLD SPECIMEN: NORMAL

## 2018-03-11 PROCEDURE — 82962 GLUCOSE BLOOD TEST: CPT

## 2018-03-11 PROCEDURE — 83690 ASSAY OF LIPASE: CPT | Performed by: EMERGENCY MEDICINE

## 2018-03-11 PROCEDURE — 85025 COMPLETE CBC W/AUTO DIFF WBC: CPT | Performed by: EMERGENCY MEDICINE

## 2018-03-11 PROCEDURE — 80053 COMPREHEN METABOLIC PANEL: CPT | Performed by: EMERGENCY MEDICINE

## 2018-03-11 PROCEDURE — 63710000001 INSULIN REGULAR HUMAN PER 5 UNITS: Performed by: EMERGENCY MEDICINE

## 2018-03-11 PROCEDURE — 71046 X-RAY EXAM CHEST 2 VIEWS: CPT

## 2018-03-11 PROCEDURE — 81003 URINALYSIS AUTO W/O SCOPE: CPT | Performed by: EMERGENCY MEDICINE

## 2018-03-11 PROCEDURE — 96360 HYDRATION IV INFUSION INIT: CPT

## 2018-03-11 PROCEDURE — 82009 KETONE BODYS QUAL: CPT | Performed by: EMERGENCY MEDICINE

## 2018-03-11 PROCEDURE — 96361 HYDRATE IV INFUSION ADD-ON: CPT

## 2018-03-11 PROCEDURE — 82800 BLOOD PH: CPT | Performed by: EMERGENCY MEDICINE

## 2018-03-11 PROCEDURE — 84484 ASSAY OF TROPONIN QUANT: CPT | Performed by: EMERGENCY MEDICINE

## 2018-03-11 RX ORDER — SODIUM CHLORIDE 0.9 % (FLUSH) 0.9 %
10 SYRINGE (ML) INJECTION AS NEEDED
Status: DISCONTINUED | OUTPATIENT
Start: 2018-03-11 | End: 2018-03-11 | Stop reason: HOSPADM

## 2018-03-11 RX ORDER — SODIUM CHLORIDE 9 MG/ML
125 INJECTION, SOLUTION INTRAVENOUS CONTINUOUS
Status: DISCONTINUED | OUTPATIENT
Start: 2018-03-11 | End: 2018-03-11 | Stop reason: HOSPADM

## 2018-03-11 RX ADMIN — SODIUM CHLORIDE 1000 ML: 900 INJECTION, SOLUTION INTRAVENOUS at 01:00

## 2018-03-11 RX ADMIN — SODIUM CHLORIDE 1000 ML: 900 INJECTION, SOLUTION INTRAVENOUS at 03:19

## 2018-03-11 RX ADMIN — HUMAN INSULIN 10 UNITS: 100 INJECTION, SOLUTION SUBCUTANEOUS at 03:02

## 2018-03-11 NOTE — ED PROVIDER NOTES
Subjective   38yo male pmh significant htn/hyperlipidemia/dm2/obesity/PE/cad/prothrombin mutation, presents ED c/o hyperglycemia, accucheck >500mg/dl at home.  ROS (+) increased thirst.  Denies fever/chills/n/v/d/chest pain/soa/cough/abd pain/dysuria.        Illness   Severity:  Moderate  Duration:  1 day      Review of Systems   Constitutional: Negative.    HENT: Negative.    Eyes: Negative.    Respiratory: Negative.    Cardiovascular: Negative.    Gastrointestinal: Negative.    Endocrine: Positive for polydipsia and polyuria.   Genitourinary: Negative.    Musculoskeletal: Negative.    Skin: Negative.    Neurological: Negative.        Past Medical History:   Diagnosis Date   • Chest pain    • Chronic back pain    • Coronary artery disease    • Diabetes mellitus    • GERD (gastroesophageal reflux disease)    • Hyperlipidemia    • Hypertension    • Morbid obesity    • Pulmonary embolism    • RLS (restless legs syndrome)    • Seizures    • Sleep apnea        Allergies   Allergen Reactions   • Glipizide Other (See Comments)     Slurred Speech   • Reglan [Metoclopramide] Anxiety   • Tramadol Other (See Comments)     seizures   • Risperdal [Risperidone] Other (See Comments)     Slurred speech       Past Surgical History:   Procedure Laterality Date   • ADENOIDECTOMY     • CARDIAC CATHETERIZATION N/A 3/15/2017    Procedure: Left Heart Cath;  Surgeon: Edwige Briceno MD;  Location: [facility] INVASIVE LOCATION;  Service:    • CARDIAC CATHETERIZATION N/A 3/15/2017    Procedure: Stent SOPHIA coronary;  Surgeon: Edwige Briceno MD;  Location: [facility] CATH INVASIVE LOCATION;  Service:    • CARDIAC CATHETERIZATION N/A 3/1/2018    Procedure: Left Heart Cath;  Surgeon: Conor Parsons MD;  Location: [facility] CATH INVASIVE LOCATION;  Service:    • CHOLECYSTECTOMY     • CORONARY ANGIOPLASTY WITH STENT PLACEMENT     • MD RT/LT HEART CATHETERS N/A 3/15/2017    Procedure: Percutaneous Coronary Intervention;  Surgeon: Edwige Briceno MD;   Location: [facility] INVASIVE LOCATION;  Service: Cardiovascular   • TONSILLECTOMY     • TRANSESOPHAGEAL ECHOCARDIOGRAM (MONICA)         Family History   Problem Relation Age of Onset   • Heart disease Mother    • Hypertension Mother    • Hyperlipidemia Mother    • Coronary artery disease Mother    • Cancer Paternal Grandfather        Social History     Social History   • Marital status:      Spouse name: Cori   • Number of children: 0     Occupational History   • Disabled      Social History Main Topics   • Smoking status: Former Smoker     Quit date: 1997   • Smokeless tobacco: Current User     Types: Snuff   • Alcohol use No   • Drug use: No   • Sexual activity: Yes     Partners: Female     Birth control/ protection: None     Other Topics Concern   • Not on file           Objective   Physical Exam   Constitutional: He is oriented to person, place, and time. He appears well-developed and well-nourished.   HENT:   Head: Normocephalic and atraumatic.   Nose: Nose normal.   Mouth/Throat: Oropharynx is clear and moist.   Eyes: Pupils are equal, round, and reactive to light.   Neck: Neck supple. No JVD present. No tracheal deviation present.   Cardiovascular: Normal rate, regular rhythm, normal heart sounds and intact distal pulses.  Exam reveals no gallop and no friction rub.    No murmur heard.  Pulmonary/Chest: Effort normal and breath sounds normal. He has no wheezes. He has no rales.   Abdominal: Soft. Bowel sounds are normal. There is no tenderness. There is no rebound and no guarding.   Musculoskeletal: Normal range of motion.   Lymphadenopathy:     He has no cervical adenopathy.   Neurological: He is alert and oriented to person, place, and time.   Skin: Skin is warm and dry.   Nursing note and vitals reviewed.      Procedures         ED Course  ED Course   Comment By Time   Pt resting comfortably. Repeat accucheck 314mg/dl. Labs noted. No evidence dka. Stable discharge. Seth Mcgarry MD 03/11 8229       Lab Results (last 24 hours)     Procedure Component Value Units Date/Time    Comprehensive Metabolic Panel [[id number]]  (Abnormal) Collected:  03/11/18 0033    Specimen:  Blood Updated:  03/11/18 0126     Glucose 504 (C) mg/dL      BUN 16 mg/dL      Creatinine 0.62 (L) mg/dL      Sodium 131 (L) mmol/L      Potassium 4.4 mmol/L      Chloride 93 (L) mmol/L      CO2 24.0 mmol/L      Calcium 9.0 mg/dL      Total Protein 6.8 g/dL      Albumin 3.60 g/dL      ALT (SGPT) 24 U/L      AST (SGOT) 23 U/L      Alkaline Phosphatase 75 U/L      Total Bilirubin 0.2 mg/dL      eGFR Non African Amer 144 mL/min/1.73      Globulin 3.2 gm/dL      A/G Ratio 1.1 g/dL      BUN/Creatinine Ratio 25.8 (H)     Anion Gap 14.0 mmol/L     Lipase [[id number]]  (Normal) Collected:  03/11/18 0033    Specimen:  Blood Updated:  03/11/18 0127     Lipase 75 U/L     pH, Venous [[id number]]  (Normal) Collected:  03/11/18 0033    Specimen:  Blood Updated:  03/11/18 0048     pH, Venous 7.372 pH Units     Troponin [[id number]]  (Normal) Collected:  03/11/18 0033    Specimen:  Blood Updated:  03/11/18 0126     Troponin I <0.012 ng/mL     CBC & Differential [[id number]] Collected:  03/11/18 0034    Specimen:  Blood Updated:  03/11/18 0048    Narrative:       The following orders were created for panel order CBC & Differential.  Procedure                               Abnormality         Status                     ---------                               -----------         ------                     CBC Auto Differential[[id number]]        Abnormal            Final result                 Please view results for these tests on the individual orders.    CBC Auto Differential [[id number]]  (Abnormal) Collected:  03/11/18 0034    Specimen:  Blood Updated:  03/11/18 0048     WBC 7.39 10*3/mm3      RBC 4.75 10*6/mm3      Hemoglobin 13.0 (L) g/dL      Hematocrit 38.8 (L) %      MCV 81.7 fL      MCH 27.4 pg      MCHC 33.5 g/dL      RDW 13.7 %      RDW-SD 40.9 fl      MPV  9.1 fL      Platelets 188 10*3/mm3      Neutrophil % 60.7 %      Lymphocyte % 23.0 %      Monocyte % 11.4 %      Eosinophil % 3.4 %      Basophil % 0.1 %      Immature Grans % 1.4 (H) %      Neutrophils, Absolute 4.49 10*3/mm3      Lymphocytes, Absolute 1.70 10*3/mm3      Monocytes, Absolute 0.84 10*3/mm3      Eosinophils, Absolute 0.25 10*3/mm3      Basophils, Absolute 0.01 10*3/mm3      Immature Grans, Absolute 0.10 (H) 10*3/mm3     Acetone [[id number]]  (Normal) Collected:  03/11/18 0034    Specimen:  Blood Updated:  03/11/18 0052     Acetone Negative    Urinalysis With / Culture If Indicated - Urine, Clean Catch [[id number]]  (Abnormal) Collected:  03/11/18 0035    Specimen:  Urine from Urine, Clean Catch Updated:  03/11/18 0053     Color, UA Yellow     Appearance, UA Clear     pH, UA 6.0     Specific Gravity, UA 1.028     Comment: Result obtained by Refractometer        Glucose, UA >=1000 mg/dL (3+) (A)     Ketones, UA Negative     Bilirubin, UA Negative     Blood, UA Negative     Protein, UA Negative     Leuk Esterase, UA Negative     Nitrite, UA Negative     Urobilinogen, UA 0.2 E.U./dL    Narrative:       Urine microscopic not indicated.        Xr Chest 2 View    Result Date: 3/11/2018  Exam: PA lateral chest INDICATION: Hypoglycemia COMPARISON: 2/27/2018 FINDINGS: PA lateral chest. The cardiomediastinal silhouette is unremarkable. The lungs are clear. No pneumothorax or pleural effusion.     No acute cardiopulmonary abnormality. Electronically signed by:  Brennan Corrigan MD  3/11/2018 1:37 AM [facility] Workstation: [id number]            [facility]    Final diagnoses:   Diabetes mellitus due to underlying condition, uncontrolled, with hyperglycemia, with long-term current use of insulin            Seth Mcgarry MD  03/11/18 0414

## 2018-03-13 ENCOUNTER — CONSULT (OUTPATIENT)
Dept: SLEEP MEDICINE | Facility: HOSPITAL | Age: 38
End: 2018-03-13

## 2018-03-13 VITALS
DIASTOLIC BLOOD PRESSURE: 80 MMHG | HEART RATE: 101 BPM | WEIGHT: 315 LBS | HEIGHT: 74 IN | OXYGEN SATURATION: 97 % | SYSTOLIC BLOOD PRESSURE: 164 MMHG | BODY MASS INDEX: 40.43 KG/M2

## 2018-03-13 DIAGNOSIS — G47.33 OBSTRUCTIVE SLEEP APNEA, ADULT: Primary | ICD-10-CM

## 2018-03-13 DIAGNOSIS — G25.81 RESTLESS LEGS SYNDROME (RLS): ICD-10-CM

## 2018-03-13 PROCEDURE — 99213 OFFICE O/P EST LOW 20 MIN: CPT | Performed by: INTERNAL MEDICINE

## 2018-03-19 ENCOUNTER — OFFICE VISIT (OUTPATIENT)
Dept: BARIATRICS/WEIGHT MGMT | Facility: CLINIC | Age: 38
End: 2018-03-19

## 2018-03-19 ENCOUNTER — OFFICE VISIT (OUTPATIENT)
Dept: BARIATRICS/WEIGHT MGMT | Facility: HOSPITAL | Age: 38
End: 2018-03-19

## 2018-03-19 VITALS
DIASTOLIC BLOOD PRESSURE: 92 MMHG | TEMPERATURE: 99.3 F | BODY MASS INDEX: 47.74 KG/M2 | HEART RATE: 90 BPM | WEIGHT: 315 LBS | HEIGHT: 68 IN | SYSTOLIC BLOOD PRESSURE: 159 MMHG | OXYGEN SATURATION: 97 %

## 2018-03-19 DIAGNOSIS — E66.9 DIABETES MELLITUS TYPE 2 IN OBESE (HCC): ICD-10-CM

## 2018-03-19 DIAGNOSIS — Z99.89 OSA ON CPAP: ICD-10-CM

## 2018-03-19 DIAGNOSIS — E66.01 MORBID OBESITY WITH BMI OF 70 AND OVER, ADULT (HCC): Primary | ICD-10-CM

## 2018-03-19 DIAGNOSIS — E11.69 DIABETES MELLITUS TYPE 2 IN OBESE (HCC): ICD-10-CM

## 2018-03-19 DIAGNOSIS — Z72.0 SNUFF USER: ICD-10-CM

## 2018-03-19 DIAGNOSIS — I10 UNCONTROLLED HYPERTENSION: ICD-10-CM

## 2018-03-19 DIAGNOSIS — E78.2 MIXED HYPERLIPIDEMIA: ICD-10-CM

## 2018-03-19 DIAGNOSIS — K21.9 GASTROESOPHAGEAL REFLUX DISEASE, ESOPHAGITIS PRESENCE NOT SPECIFIED: ICD-10-CM

## 2018-03-19 DIAGNOSIS — G47.33 OSA ON CPAP: ICD-10-CM

## 2018-03-19 PROCEDURE — 99214 OFFICE O/P EST MOD 30 MIN: CPT | Performed by: NURSE PRACTITIONER

## 2018-03-19 PROCEDURE — 97802 MEDICAL NUTRITION INDIV IN: CPT

## 2018-03-19 NOTE — PATIENT INSTRUCTIONS
Yordy Hansen is a  37 y.o. who has morbid obesity with BMI of 73.2 and desires surgical weight loss. Patient has the following comorbidities: DM, HTN, hyperlipidemia, CAD, ADOLFO, Hx of PE.  Patient has been advised that this surgery is considered to be elective major surgery that is typically done laparoscopically. Patient is a potentially good surgical candidate. Patient will need to meet with the Bariatric Surgeon to further discuss surgical options. Patient has been advised that they will need to have a work-up prior to surgery. This work-up will include an EGD to assess for H. Pylori and Wu's esophagus. Additionally, patient will need to have a psychological evaluation and clearance prior to surgery. Patient will need the following additional clearances/testing: cardiac, pulmonary, vascular. Baseline lab work will be obtained today. Patient will see the dietician today for make specific goals for diet, exercise, and lifestyle. Patient has received intensive behavioral therapy for obesity today. I will have them follow up in one month for a weight recheck and to further discuss options.

## 2018-03-19 NOTE — PROGRESS NOTES
"Subjective   Yordy Hansen is a 37 y.o. male.     History of Present Illness   Yordy Hansen is a 37 y.o. year-old who has morbid obesity and is interested in having surgical weight loss. Patient has been overweight for the past 20 plus years. He remembers as a child being bigger than other kids. The most weight ever lost was 30 pounds from eating better. He was able to keep the weight off for about 6 months. Unsupervised diet attempts include: low fat, Jenny's, and low carb diet. Supervised diet attempts include: medically supervised years ago with phentermine. Patient has tried the following OTC or prescription medications for weight loss: phentermine. Patient states he tends to eat due to physical hunger and boredom. Patient is limited in activities due to: back pain and knee pain. He is currently disabled.   Vitals:    03/19/18 1252   BP: 159/92   BP Location: Right arm   Patient Position: Sitting   Cuff Size: Adult   Pulse: 90   Temp: 99.3 °F (37.4 °C)   SpO2: 97%   Weight: (!) 218 kg (481 lb)   Height: 172.7 cm (68\")     He  has a past medical history of Chest pain; Chronic back pain; Coronary artery disease; Diabetes mellitus; Factor II deficiency; Fatty liver; GERD (gastroesophageal reflux disease); Hyperlipidemia; Hypertension; Morbid obesity; Pulmonary embolism; RLS (restless legs syndrome); Seizures; and Sleep apnea.  He  does not have any pertinent problems on file.  He  has a past surgical history that includes transesophageal echocardiogram (travis); pr rt/lt heart catheters (N/A, 3/15/2017); Cardiac catheterization (N/A, 3/15/2017); Cardiac catheterization (N/A, 3/15/2017); Adenoidectomy; Tonsillectomy; Coronary angioplasty with stent; Cardiac catheterization (N/A, 3/1/2018); and Cholecystectomy.  His family history includes Cancer in his paternal grandfather; Coronary artery disease in his mother; Diabetes in his mother; Heart disease in his mother; Hyperlipidemia in his mother; Hypertension in " his mother; Kidney failure in his mother; Obesity in his mother; Sleep apnea in his father.  He  reports that he quit smoking about 21 years ago. His smoking use included Cigarettes. He has a 0.12 pack-year smoking history. His smokeless tobacco use includes Snuff. He reports that he does not drink alcohol or use drugs.  Current Outpatient Prescriptions   Medication Sig Dispense Refill   • albuterol (ACCUNEB) 1.25 MG/3ML nebulizer solution Take 3 mL by nebulization Every 6 (Six) Hours As Needed for Wheezing. 100 vial 0   • albuterol (PROVENTIL HFA;VENTOLIN HFA) 108 (90 BASE) MCG/ACT inhaler Inhale 2 puffs Every 4 (Four) Hours As Needed for Wheezing.     • amLODIPine (NORVASC) 10 MG tablet Take 1 tablet by mouth Every Night. 30 tablet 5   • clopidogrel (PLAVIX) 75 MG tablet Take 1 tablet by mouth Daily. 30 tablet 3   • glucose blood test strip One touch Verio strips 4x daily test PRN for blood sugar 100 each 6   • glucose monitor monitoring kit 1 each As Needed (check blood glucose 3x daily). 1 each 0   • insulin aspart (novoLOG) 100 UNIT/ML injection Inject 20 Units under the skin 3 (Three) Times a Day Before Meals. 10 mL 3   • insulin detemir (LEVEMIR) 100 UNIT/ML injection Inject 40 Units under the skin Every Night. 10 mL 3   • isosorbide mononitrate (IMDUR) 120 MG 24 hr tablet TAKE ONE TABLET BY MOUTH ONCE DAILY 30 tablet 0   • lisinopril (PRINIVIL,ZESTRIL) 40 MG tablet Take 1 tablet by mouth Every Morning. 30 tablet 5   • metFORMIN (GLUCOPHAGE) 1000 MG tablet Take 1 tablet by mouth 2 (Two) Times a Day With Meals. 60 tablet 2   • metoprolol succinate XL (TOPROL XL) 200 MG 24 hr tablet Take 1 tablet by mouth Daily. 31 tablet 6   • MICROLET LANCETS misc One touch delica lancets 100 each 5   • nitroglycerin (NITROSTAT) 0.4 MG SL tablet Place 1 tablet under the tongue Every 5 (Five) Minutes As Needed for Chest Pain. Take no more than 3 doses in 15 minutes. 100 tablet 12   • ondansetron ODT (ZOFRAN-ODT) 4 MG  "disintegrating tablet Take 1 tablet by mouth Every 6 (Six) Hours As Needed for Nausea or Vomiting. 10 tablet 0   • pantoprazole (PROTONIX) 40 MG EC tablet Take 1 tablet by mouth Every Night. 30 tablet 5   • pramipexole (MIRAPEX) 1 MG tablet Take 2 tablets by mouth Every Night. Taking 2mg daily 60 tablet 5   • pravastatin (PRAVACHOL) 80 MG tablet Take 1 tablet by mouth Every Night. 30 tablet 5   • ranolazine (RANEXA) 1000 MG 12 hr tablet Take 1 tablet by mouth Every 12 (Twelve) Hours. 60 tablet 5   • RELION INSULIN SYRINGE 31G X 15/64\" 0.5 ML misc      • rivaroxaban (XARELTO) 20 MG tablet Take 1 tablet by mouth Daily. 30 tablet 4   • sertraline (ZOLOFT) 25 MG tablet Take 1 tablet by mouth Daily. 30 tablet 5   • traZODone (DESYREL) 300 MG tablet Take 1 tablet by mouth Every Night. 30 tablet 5   • linagliptin (TRADJENTA) 5 MG tablet tablet Take 1 tablet by mouth Daily. 30 tablet 1     No current facility-administered medications for this visit.      He is allergic to glipizide; reglan [metoclopramide]; tramadol; and risperdal [risperidone].      The following portions of the patient's history were reviewed and updated as appropriate: allergies, current medications, past family history, past medical history, past social history, past surgical history and problem list.    Review of Systems   Constitutional: Negative for activity change, appetite change and fatigue.   HENT: Positive for postnasal drip.    Eyes: Positive for visual disturbance.   Respiratory: Positive for apnea and wheezing. Negative for cough, chest tightness and shortness of breath.         Hx of asthma, ADOLFO, and PE   Cardiovascular: Positive for chest pain. Negative for palpitations and leg swelling.        HTN, uncontrollable   Gastrointestinal: Negative.  Negative for abdominal pain, anal bleeding, constipation, nausea and vomiting.        Heartburn, fatty liver   Endocrine: Negative.         DM   Genitourinary: Negative.    Musculoskeletal: Positive " for arthralgias and back pain.   Skin: Negative.    Allergic/Immunologic: Negative.    Neurological: Negative.  Negative for seizures and syncope.        RLS   Hematological: Bruises/bleeds easily.        HX of PE and stents on xarelto and plavix; has Fatctor II deficiency   Psychiatric/Behavioral: Negative.  Negative for dysphoric mood, self-injury and sleep disturbance.       Objective   Physical Exam   Constitutional: He is oriented to person, place, and time. Vital signs are normal. He appears well-developed and well-nourished. He is cooperative. No distress.   HENT:   Head: Normocephalic and atraumatic.   Nose: Nose normal.   Mouth/Throat: Oropharynx is clear and moist. No oropharyngeal exudate or tonsillar abscesses.   Eyes: Conjunctivae, EOM and lids are normal. Pupils are equal, round, and reactive to light. Right eye exhibits no discharge. Left eye exhibits no discharge.   Neck: Trachea normal. Neck supple. No JVD present. Carotid bruit is not present. No no neck rigidity. No tracheal deviation present. No thyromegaly present.   Short and thick neck   Cardiovascular: Normal rate, regular rhythm, S1 normal, S2 normal and normal heart sounds.    Pulmonary/Chest: Effort normal. No stridor. No respiratory distress. He has decreased breath sounds in the right upper field, the right middle field, the right lower field, the left upper field, the left middle field and the left lower field. He has no wheezes. He has no rales.   Abdominal: Soft. Bowel sounds are normal. He exhibits no distension. There is no tenderness.   Obese; large panus   Musculoskeletal: He exhibits edema.        Right shoulder: He exhibits normal strength.   2+ edema noted to lower legs (left worse than right leg; chronic)   Lymphadenopathy:     He has no cervical adenopathy.   Neurological: He is alert and oriented to person, place, and time. He has normal strength. No cranial nerve deficit.   Skin: Skin is warm, dry and intact. No rash noted.    Psychiatric: He has a normal mood and affect. His speech is normal and behavior is normal.   Alert and oriented x 3   Vitals reviewed.      Assessment/Plan   Yordy was seen today for consult, obesity, nutrition counseling and weight loss.    Diagnoses and all orders for this visit:    Morbid obesity with BMI of 70 and over, adult  -     Bariatric Nutritional Counseling; Standing  -     TSH; Future    Mixed hyperlipidemia  -     Bariatric Nutritional Counseling; Standing  -     TSH; Future    Uncontrolled hypertension  -     Bariatric Nutritional Counseling; Standing  -     TSH; Future    ADOLFO on CPAP    Gastroesophageal reflux disease, esophagitis presence not specified  -     Bariatric Nutritional Counseling; Standing    Diabetes mellitus type 2 in obese  -     Bariatric Nutritional Counseling; Standing  -     TSH; Future    Snuff user        Yordy Hansen is a  37 y.o. who has morbid obesity with BMI of 73.2 and desires surgical weight loss. Patient has the following comorbidities: DM, HTN, hyperlipidemia, CAD, ADOLFO, Hx of PE.  Patient has been advised that this surgery is considered to be elective major surgery that is typically done laparoscopically. Patient is a potentially good surgical candidate. Patient will need to meet with the Bariatric Surgeon to further discuss surgical options. Patient has been advised that they will need to have a work-up prior to surgery. This work-up will include an EGD to assess for H. Pylori and Wu's esophagus. Additionally, patient will need to have a psychological evaluation and clearance prior to surgery. Patient will need the following additional clearances/testing: cardiac, pulmonary, vascular. Baseline lab work will be obtained today. Patient will see the dietician today for make specific goals for diet, exercise, and lifestyle. Patient has received intensive behavioral therapy for obesity today. I will have them follow up in one month for a weight recheck and to  further discuss options.

## 2018-03-19 NOTE — PROGRESS NOTES
"Nutrition Bariatric/[facility] Note     Visit   Initial Assessment     Anthropometrics   Height: 68\"  Weight: 481#  BMI: 73.1    Waist: 73\"  Hip: 69\"  Chest: 70\"  Thigh: 31\"  Arm: 19\"  Body Fat: 46.4%    Nutrition Recall  24 Hour recall:  (B) skips meal (12-3pm) fried chicken strips or pizza or fried pork tenderloin strips, sweet tea or flavored water or 2% milk (D) skips meal  Eating 1 meals daily   Snacking in evening: popcorn, Ritz crackers, ice cream, cookies  Excessive sweet intake  Large portions  Drinking with meals   Drinking carbonated beverages. Was drinking 4 per day but has decreased to 2-3 per week.  Drinking greater to or equal to 64 fluid ounces    Exercise   None    Habits:  None    Education    Goal Setting and Information Packet    Nutrition Goals   Eat 3 meals per day with protein  Eat protein first at meals  Eliminate snacks  Healthier food choices  Portion control / Use smaller plate or measuring cup   Eliminate soda  Replace sugar beverages with artifical sweetened     Exercise Goals   Add 15-30 minutes of walking, cycling, elliptical, swimming, chair, yoga or crossfit daily    Christine Rojo RD, LD  03/19/2018  1:05 PM    "

## 2018-03-22 ENCOUNTER — HOSPITAL ENCOUNTER (EMERGENCY)
Facility: HOSPITAL | Age: 38
Discharge: HOME | End: 2018-03-23
Attending: EMERGENCY MEDICINE | Admitting: EMERGENCY MEDICINE

## 2018-03-22 DIAGNOSIS — R07.9 CHEST PAIN, UNSPECIFIED TYPE: Primary | ICD-10-CM

## 2018-03-22 LAB
BASOPHILS # BLD AUTO: 0.02 10*3/MM3 (ref 0–0.2)
BASOPHILS NFR BLD AUTO: 0.3 % (ref 0–2)
D-LACTATE SERPL-SCNC: 2.8 MMOL/L (ref 0.5–2)
DEPRECATED RDW RBC AUTO: 41.5 FL (ref 35.1–43.9)
EOSINOPHIL # BLD AUTO: 0.21 10*3/MM3 (ref 0–0.7)
EOSINOPHIL NFR BLD AUTO: 3.1 % (ref 0–7)
ERYTHROCYTE [DISTWIDTH] IN BLOOD BY AUTOMATED COUNT: 14 % (ref 11.5–14.5)
HCT VFR BLD AUTO: 39.9 % (ref 39–49)
HGB BLD-MCNC: 13.6 G/DL (ref 13.7–17.3)
IMM GRANULOCYTES # BLD: 0.06 10*3/MM3 (ref 0–0.02)
IMM GRANULOCYTES NFR BLD: 0.9 % (ref 0–0.5)
LYMPHOCYTES # BLD AUTO: 1.53 10*3/MM3 (ref 0.6–4.2)
LYMPHOCYTES NFR BLD AUTO: 22.6 % (ref 10–50)
MCH RBC QN AUTO: 27.7 PG (ref 26.5–34)
MCHC RBC AUTO-ENTMCNC: 34.1 G/DL (ref 31.5–36.3)
MCV RBC AUTO: 81.3 FL (ref 80–98)
MONOCYTES # BLD AUTO: 0.62 10*3/MM3 (ref 0–0.9)
MONOCYTES NFR BLD AUTO: 9.2 % (ref 0–12)
NEUTROPHILS # BLD AUTO: 4.33 10*3/MM3 (ref 2–8.6)
NEUTROPHILS NFR BLD AUTO: 63.9 % (ref 37–80)
PLATELET # BLD AUTO: 171 10*3/MM3 (ref 150–450)
PMV BLD AUTO: 9.1 FL (ref 8–12)
RBC # BLD AUTO: 4.91 10*6/MM3 (ref 4.37–5.74)
TROPONIN I SERPL-MCNC: <0.012 NG/ML
WBC NRBC COR # BLD: 6.77 10*3/MM3 (ref 3.2–9.8)

## 2018-03-22 PROCEDURE — 84484 ASSAY OF TROPONIN QUANT: CPT | Performed by: EMERGENCY MEDICINE

## 2018-03-22 PROCEDURE — 94640 AIRWAY INHALATION TREATMENT: CPT

## 2018-03-22 PROCEDURE — 80053 COMPREHEN METABOLIC PANEL: CPT | Performed by: EMERGENCY MEDICINE

## 2018-03-22 PROCEDURE — 99284 EMERGENCY DEPT VISIT MOD MDM: CPT

## 2018-03-22 PROCEDURE — 93010 ELECTROCARDIOGRAM REPORT: CPT | Performed by: INTERNAL MEDICINE

## 2018-03-22 PROCEDURE — 87040 BLOOD CULTURE FOR BACTERIA: CPT | Performed by: EMERGENCY MEDICINE

## 2018-03-22 PROCEDURE — 83605 ASSAY OF LACTIC ACID: CPT | Performed by: EMERGENCY MEDICINE

## 2018-03-22 PROCEDURE — 83880 ASSAY OF NATRIURETIC PEPTIDE: CPT | Performed by: EMERGENCY MEDICINE

## 2018-03-22 PROCEDURE — 93005 ELECTROCARDIOGRAM TRACING: CPT | Performed by: EMERGENCY MEDICINE

## 2018-03-22 PROCEDURE — 94760 N-INVAS EAR/PLS OXIMETRY 1: CPT

## 2018-03-22 PROCEDURE — 85025 COMPLETE CBC W/AUTO DIFF WBC: CPT | Performed by: EMERGENCY MEDICINE

## 2018-03-22 PROCEDURE — 81003 URINALYSIS AUTO W/O SCOPE: CPT | Performed by: EMERGENCY MEDICINE

## 2018-03-22 PROCEDURE — 85379 FIBRIN DEGRADATION QUANT: CPT | Performed by: EMERGENCY MEDICINE

## 2018-03-22 RX ORDER — SODIUM CHLORIDE 0.9 % (FLUSH) 0.9 %
10 SYRINGE (ML) INJECTION AS NEEDED
Status: DISCONTINUED | OUTPATIENT
Start: 2018-03-22 | End: 2018-03-23 | Stop reason: HOSPADM

## 2018-03-22 RX ORDER — IPRATROPIUM BROMIDE AND ALBUTEROL SULFATE 2.5; .5 MG/3ML; MG/3ML
3 SOLUTION RESPIRATORY (INHALATION) ONCE
Status: COMPLETED | OUTPATIENT
Start: 2018-03-22 | End: 2018-03-22

## 2018-03-22 RX ORDER — IPRATROPIUM BROMIDE AND ALBUTEROL SULFATE 2.5; .5 MG/3ML; MG/3ML
6 SOLUTION RESPIRATORY (INHALATION) ONCE
Status: COMPLETED | OUTPATIENT
Start: 2018-03-22 | End: 2018-03-22

## 2018-03-22 RX ADMIN — IPRATROPIUM BROMIDE AND ALBUTEROL SULFATE 3 ML: 2.5; .5 SOLUTION RESPIRATORY (INHALATION) at 23:50

## 2018-03-22 RX ADMIN — IPRATROPIUM BROMIDE AND ALBUTEROL SULFATE 6 ML: 2.5; .5 SOLUTION RESPIRATORY (INHALATION) at 23:50

## 2018-03-23 ENCOUNTER — APPOINTMENT (OUTPATIENT)
Dept: GENERAL RADIOLOGY | Facility: HOSPITAL | Age: 38
End: 2018-03-23

## 2018-03-23 VITALS
HEIGHT: 69 IN | RESPIRATION RATE: 24 BRPM | OXYGEN SATURATION: 96 % | WEIGHT: 315 LBS | SYSTOLIC BLOOD PRESSURE: 168 MMHG | DIASTOLIC BLOOD PRESSURE: 74 MMHG | BODY MASS INDEX: 46.65 KG/M2 | HEART RATE: 96 BPM | TEMPERATURE: 98.1 F

## 2018-03-23 LAB
ALBUMIN SERPL-MCNC: 3.8 G/DL (ref 3.4–4.8)
ALBUMIN/GLOB SERPL: 1.1 G/DL (ref 1.1–1.8)
ALP SERPL-CCNC: 78 U/L (ref 38–126)
ALT SERPL W P-5'-P-CCNC: 38 U/L (ref 21–72)
ANION GAP SERPL CALCULATED.3IONS-SCNC: 11 MMOL/L (ref 5–15)
AST SERPL-CCNC: 44 U/L (ref 17–59)
BILIRUB SERPL-MCNC: 0.2 MG/DL (ref 0.2–1.3)
BILIRUB UR QL STRIP: NEGATIVE
BUN BLD-MCNC: 14 MG/DL (ref 7–21)
BUN/CREAT SERPL: 20 (ref 7–25)
CALCIUM SPEC-SCNC: 9.5 MG/DL (ref 8.4–10.2)
CHLORIDE SERPL-SCNC: 95 MMOL/L (ref 95–110)
CLARITY UR: CLEAR
CO2 SERPL-SCNC: 28 MMOL/L (ref 22–31)
COLOR UR: YELLOW
CREAT BLD-MCNC: 0.7 MG/DL (ref 0.7–1.3)
D-DIMER, QUANTITATIVE (MAD,POW, STR): 277 NG/ML (FEU) (ref 0–470)
D-LACTATE SERPL-SCNC: 1.9 MMOL/L (ref 0.5–2)
GFR SERPL CREATININE-BSD FRML MDRD: 126 ML/MIN/1.73
GLOBULIN UR ELPH-MCNC: 3.4 GM/DL (ref 2.3–3.5)
GLUCOSE BLD-MCNC: 358 MG/DL (ref 60–100)
GLUCOSE UR STRIP-MCNC: ABNORMAL MG/DL
HGB UR QL STRIP.AUTO: NEGATIVE
HOLD SPECIMEN: NORMAL
KETONES UR QL STRIP: NEGATIVE
LEUKOCYTE ESTERASE UR QL STRIP.AUTO: NEGATIVE
NITRITE UR QL STRIP: NEGATIVE
NT-PROBNP SERPL-MCNC: <11.1 PG/ML (ref 0–450)
NT-PROBNP SERPL-MCNC: <11.1 PG/ML (ref 0–450)
PH UR STRIP.AUTO: 5.5 [PH] (ref 5–9)
POTASSIUM BLD-SCNC: 4.3 MMOL/L (ref 3.5–5.1)
PROT SERPL-MCNC: 7.2 G/DL (ref 6.3–8.6)
PROT UR QL STRIP: NEGATIVE
SODIUM BLD-SCNC: 134 MMOL/L (ref 137–145)
SP GR UR STRIP: 1.03 (ref 1–1.03)
TROPONIN I SERPL-MCNC: <0.012 NG/ML
UROBILINOGEN UR QL STRIP: ABNORMAL
WHOLE BLOOD HOLD SPECIMEN: NORMAL
WHOLE BLOOD HOLD SPECIMEN: NORMAL

## 2018-03-23 PROCEDURE — 83880 ASSAY OF NATRIURETIC PEPTIDE: CPT | Performed by: EMERGENCY MEDICINE

## 2018-03-23 PROCEDURE — 25010000002 KETOROLAC TROMETHAMINE PER 15 MG: Performed by: EMERGENCY MEDICINE

## 2018-03-23 PROCEDURE — 96374 THER/PROPH/DIAG INJ IV PUSH: CPT

## 2018-03-23 PROCEDURE — 84484 ASSAY OF TROPONIN QUANT: CPT | Performed by: EMERGENCY MEDICINE

## 2018-03-23 PROCEDURE — 96375 TX/PRO/DX INJ NEW DRUG ADDON: CPT

## 2018-03-23 PROCEDURE — 96361 HYDRATE IV INFUSION ADD-ON: CPT

## 2018-03-23 PROCEDURE — 87040 BLOOD CULTURE FOR BACTERIA: CPT | Performed by: EMERGENCY MEDICINE

## 2018-03-23 PROCEDURE — 83605 ASSAY OF LACTIC ACID: CPT | Performed by: EMERGENCY MEDICINE

## 2018-03-23 PROCEDURE — 71046 X-RAY EXAM CHEST 2 VIEWS: CPT

## 2018-03-23 PROCEDURE — 25010000002 LORAZEPAM PER 2 MG: Performed by: EMERGENCY MEDICINE

## 2018-03-23 PROCEDURE — 25010000002 MORPHINE PER 10 MG: Performed by: EMERGENCY MEDICINE

## 2018-03-23 PROCEDURE — 25010000002 PROMETHAZINE PER 50 MG: Performed by: EMERGENCY MEDICINE

## 2018-03-23 RX ORDER — LORAZEPAM 2 MG/ML
1 INJECTION INTRAMUSCULAR ONCE
Status: COMPLETED | OUTPATIENT
Start: 2018-03-23 | End: 2018-03-23

## 2018-03-23 RX ORDER — PROMETHAZINE HYDROCHLORIDE 25 MG/ML
12.5 INJECTION, SOLUTION INTRAMUSCULAR; INTRAVENOUS ONCE
Status: COMPLETED | OUTPATIENT
Start: 2018-03-23 | End: 2018-03-23

## 2018-03-23 RX ORDER — KETOROLAC TROMETHAMINE 15 MG/ML
10 INJECTION, SOLUTION INTRAMUSCULAR; INTRAVENOUS ONCE
Status: COMPLETED | OUTPATIENT
Start: 2018-03-23 | End: 2018-03-23

## 2018-03-23 RX ADMIN — LORAZEPAM 1 MG: 2 INJECTION, SOLUTION INTRAMUSCULAR; INTRAVENOUS at 03:51

## 2018-03-23 RX ADMIN — MORPHINE SULFATE 4 MG: 4 INJECTION, SOLUTION INTRAMUSCULAR; INTRAVENOUS at 02:44

## 2018-03-23 RX ADMIN — SODIUM CHLORIDE 1000 ML: 900 INJECTION, SOLUTION INTRAVENOUS at 03:38

## 2018-03-23 RX ADMIN — KETOROLAC TROMETHAMINE 10 MG: 15 INJECTION, SOLUTION INTRAMUSCULAR; INTRAVENOUS at 01:12

## 2018-03-23 RX ADMIN — SODIUM CHLORIDE 1000 ML: 900 INJECTION, SOLUTION INTRAVENOUS at 01:13

## 2018-03-23 RX ADMIN — PROMETHAZINE HYDROCHLORIDE 12.5 MG: 25 INJECTION INTRAMUSCULAR; INTRAVENOUS at 01:29

## 2018-03-23 NOTE — ED PROVIDER NOTES
Subjective   History of Present Illness  37-year-old male morbid obesity who presents to the emergency department because of some chest pain difficulty breathing.  He has a history of coronary artery disease with previous stent placements as well as a history of pulmonary embolisms currently on NOAC therapy.  He's had numerous recent admissions for similar symptoms with previous CT scans without any new pulmonary embolisms.  Each study is technically difficult secondary to his body habitus but they do not see any obvious Main artery pulmonary embolisms.  He tells me that he is compliant with his medications.  He also had a recent admission with cardiac catheter that showed patent stents and clean coronaries otherwise.  This catheter was performed on March 1.  He has no fevers or chills.  His pain is atypical and nonexertional.  Started a few hours prior to arrival in the ED.  Patient has no vomiting or diarrhea.  Denies any abdominal pain.  Review of Systems   Constitutional: Negative for fever.   HENT: Negative for congestion, nosebleeds, postnasal drip, sinus pressure and sore throat.    Respiratory: Positive for shortness of breath and wheezing. Negative for cough, chest tightness and stridor.    Cardiovascular: Positive for chest pain.   Gastrointestinal: Negative for abdominal pain, constipation, diarrhea, nausea and vomiting.   Genitourinary: Negative for dysuria, flank pain, frequency, hematuria, testicular pain and urgency.   Skin: Negative for rash.   Neurological: Negative for syncope, light-headedness and headaches.   Psychiatric/Behavioral: Negative.        Past Medical History:   Diagnosis Date   • Chest pain    • Chronic back pain    • Coronary artery disease    • Diabetes mellitus     10.6% was last A1C per patient   • Factor II deficiency    • Fatty liver    • GERD (gastroesophageal reflux disease)    • Hyperlipidemia    • Hypertension    • Morbid obesity    • Pulmonary embolism    • RLS (restless  legs syndrome)    • Seizures     last one many years ago   • Sleep apnea     c-pap       Allergies   Allergen Reactions   • Glipizide Other (See Comments)     Slurred Speech   • Reglan [Metoclopramide] Anxiety   • Tramadol Other (See Comments)     seizures   • Risperdal [Risperidone] Other (See Comments)     Slurred speech       Past Surgical History:   Procedure Laterality Date   • ADENOIDECTOMY     • CARDIAC CATHETERIZATION N/A 3/15/2017    Procedure: Left Heart Cath;  Surgeon: Edwige Briceno MD;  Location: [facility] CATH INVASIVE LOCATION;  Service:    • CARDIAC CATHETERIZATION N/A 3/15/2017    Procedure: Stent SOPHIA coronary;  Surgeon: Edwige Briceno MD;  Location: [facility] CATH INVASIVE LOCATION;  Service:    • CARDIAC CATHETERIZATION N/A 3/1/2018    Procedure: Left Heart Cath;  Surgeon: Conor Parsons MD;  Location: [facility] CATH INVASIVE LOCATION;  Service:    • CHOLECYSTECTOMY      lap   • CORONARY ANGIOPLASTY WITH STENT PLACEMENT     • TX RT/LT HEART CATHETERS N/A 3/15/2017    Procedure: Percutaneous Coronary Intervention;  Surgeon: Edwige Briceno MD;  Location: [facility] CATH INVASIVE LOCATION;  Service: Cardiovascular   • TONSILLECTOMY     • TRANSESOPHAGEAL ECHOCARDIOGRAM (MONICA)         Family History   Problem Relation Age of Onset   • Heart disease Mother    • Hypertension Mother    • Hyperlipidemia Mother    • Coronary artery disease Mother    • Diabetes Mother    • Obesity Mother    • Kidney failure Mother    • Sleep apnea Father    • Cancer Paternal Grandfather        Social History     Social History   • Marital status:      Spouse name: Cori   • Number of children: 0     Occupational History   • Disabled      Social History Main Topics   • Smoking status: Former Smoker     Packs/day: 0.25     Years: 0.50     Types: Cigarettes     Quit date: 1997   • Smokeless tobacco: Current User     Types: Snuff      Comment: quit smoking 25 years ago; he does use snuff   • Alcohol use No   • Drug use: No   • Sexual  activity: Yes     Partners: Female     Birth control/ protection: None     Other Topics Concern   • Not on file           Objective   Physical Exam   Constitutional: He is oriented to person, place, and time. He appears well-developed and well-nourished.   Morbidly obese male   HENT:   Head: Normocephalic and atraumatic.   Eyes: Conjunctivae and EOM are normal. Pupils are equal, round, and reactive to light.   Neck: Normal range of motion. Neck supple.   Cardiovascular: Regular rhythm and normal heart sounds.    Patient has distant heart sounds secondary to body habitus   Pulmonary/Chest: Effort normal and breath sounds normal.   Patient initially had some very faint wheezing.  He has some mild tachypnea.  He is moving air fairly well.  He is anxious   Abdominal: Soft. He exhibits no distension. There is no tenderness. There is no rebound and no guarding.   Genitourinary: Penis normal.   Musculoskeletal: Normal range of motion.   Neurological: He is alert and oriented to person, place, and time.   Skin: Skin is warm and dry. Capillary refill takes less than 2 seconds.   Psychiatric: He has a normal mood and affect.   Nursing note and vitals reviewed.      ECG 12 Lead    Date/Time: 3/23/2018 3:15 AM  Performed by: IGOR BENITEZ  Authorized by: IGOR BENITEZ   Interpreted by physician  Comments: Sinus rate of 100.  No STEMI.               ED Course  ED Course      Labs Reviewed   COMPREHENSIVE METABOLIC PANEL - Abnormal; Notable for the following:        Result Value    Glucose 358 (*)     Sodium 134 (*)     All other components within normal limits   LACTIC ACID, PLASMA - Abnormal; Notable for the following:     Lactate 2.8 (*)     All other components within normal limits   URINALYSIS W/ CULTURE IF INDICATED - Abnormal; Notable for the following:     Glucose, UA >=1000 mg/dL (3+) (*)     All other components within normal limits    Narrative:     Urine microscopic not indicated.   CBC WITH AUTO  DIFFERENTIAL - Abnormal; Notable for the following:     Hemoglobin 13.6 (*)     Immature Grans % 0.9 (*)     Immature Grans, Absolute 0.06 (*)     All other components within normal limits   BNP (IN-HOUSE) - Normal   TROPONIN (IN-HOUSE) - Normal   LACTIC ACID, PLASMA - Normal   TROPONIN (IN-HOUSE) - Normal   BNP (IN-HOUSE) - Normal   BLOOD CULTURE   BLOOD CULTURE   RAINBOW DRAW    Narrative:     The following orders were created for panel order [facility] Draw.  Procedure                               Abnormality         Status                     ---------                               -----------         ------                     Light Blue Top[[id number]]                                   Final result               Green Top (Gel)[[id number]]                                  Final result               Lavender Top[[id number]]                                     Final result               Gold Top - SST[[id number]]                                   Final result                 Please view results for these tests on the individual orders.   LACTIC ACID REFLEX TIMER   D-DIMER, QUANTITATIVE   CBC AND DIFFERENTIAL    Narrative:     The following orders were created for panel order CBC & Differential.  Procedure                               Abnormality         Status                     ---------                               -----------         ------                     CBC Auto Differential[[id number]]        Abnormal            Final result                 Please view results for these tests on the individual orders.   LIGHT BLUE TOP   GREEN TOP   LAVENDER TOP   GOLD TOP - SST   EXTRA TUBES    Narrative:     The following orders were created for panel order Extra Tubes.  Procedure                               Abnormality         Status                     ---------                               -----------         ------                     Gold Top - SST[[id number]]                                   Final result                  Please view results for these tests on the individual orders.   [facility]     XR Chest PA & Lateral   Final Result   No acute cardiomediastinal abnormality.      Electronically signed by:  Brennan Corrigan MD  3/23/2018 12:55 AM   CDT Workstation: [id number]        March 1st Studies    Selective coronary angiography:   Dominance: Codominant     Left Main coronary artery: Large caliber vessel free of significant disease     Left anterior descending artery: Previously placed stent in the LAD is widely patent, LAD goes all the way to the apex     Left circumflex: Large caliber vessel giving is to codominant PDA and free of significant disease      Right coronary artery:  Large caliber vessel free of significant disease           Conclusion : Previously placed stent in the LAD is widely patent, all other coronary artery looks okay    · The quality of the study is limited due to poor acoustic windows related to patient body habitus. Note also that contrast was used to enhance ejection fraction.  · Left Ventricle: Left ventricular systolic function is normal. Estimated EF appears to be in the range of greater than 70%.  · Left ventricular wall thickness is consistent with moderate-to-severe concentric hypertrophy  · Right Ventricle: Normal right ventricular cavity size, wall thickness, systolic function and septal motion noted.  · No valvular abnormalities given the limitations of the study.  · There is no evidence of pericardial effusion        February Studies    IMPRESSION:  CONCLUSION:  No evidence of deep venous thrombosis in the common  femoral, femoral, popliteal and calf veins of the bilateral   lower extremity.     CT angiogram chest with contrast on 2/14/2018      CLINICAL INDICATION: Shortness of breath, history of pulmonary  embolus     TECHNIQUE: Multiple axial images are obtained throughout the  chest following the administration of IV contrast.  Computer  generated 3D reconstructions/MIPS  were performed. This study was  performed with techniques to keep radiation doses as low as  reasonably achievable, (ALARA).   Total DLP is 1211.7 mGy*cm.     COMPARISON: 1/2/2018      FINDINGS: Bolus timing is significantly suboptimal for evaluation  of pulmonary embolus. No gross large or central filling defect is  noted but cannot exclude pulmonary emboli on this exam.  Examination is also limited by streak artifact from patient's  body habitus. There is no aortic aneurysm or dissection. There is  no pleural or pericardial effusion. Limited visualized upper  abdomen is unremarkable. There is no thoracic adenopathy. The  lungs are clear. Couple of old left rib fractures are noted. No  acute bony abnormality is noted.     IMPRESSION:  1. Bolus timing is markedly suboptimal for evaluation of  pulmonary embolus with no evidence of a gross large or central  pulmonary embolus but cannot otherwise exclude pulmonary emboli  on this exam.  2. Otherwise essentially unremarkable.    January Studies    CT angiogram chest with contrast on 1/2/2018      CLINICAL INDICATION: Chest pain, history of pulmonary embolus     TECHNIQUE: Multiple axial images are obtained throughout the  chest following the administration of IV contrast.  Computer  generated 3D reconstructions/MIPS were performed. This study was  performed with techniques to keep radiation doses as low as  reasonably achievable, (ALARA).   Total DLP is 4236.8 mGy*cm.     COMPARISON: 12/12/2017      FINDINGS: Bolus timing limits evaluation for pulmonary embolus.  There is no aortic aneurysm or dissection. No large or central  filling defects are noted in the pulmonary arteries. Would be  difficult to fully exclude small peripheral pulmonary emboli on  this examination. There is no pleural or pericardial effusion.  The patient is status post cholecystectomy. Limited visualized  upper abdomen is unremarkable. There is no thoracic adenopathy.  The lungs are clear. No bony  abnormality is noted.     IMPRESSION:  1. Bolus timing limits evaluation for pulmonary embolus with no  evidence of a large or central pulmonary embolus but cannot fully  exclude small peripheral pulmonary emboli on this exam.  2. Otherwise no acute abnormality.        MDM  Number of Diagnoses or Management Options  Chest pain, unspecified type:   Diagnosis management comments: Morbidly obese male with numerous previous medical comorbidities.  He had a recent cardiac catheter that showed patent stents and clean coronaries otherwise.  His initial troponin is negative his EKG does not show a STEMI therefore do not believe the patient is having acute coronary syndrome.  He does have a history of pulmonary embolisms as well because he is compliant with his medications.  His also had repeat CT scans for similar symptoms lately and each time it is been a poor quality study that did not show any new pulmonary embolisms.  At this point in time given the patient's compliance with medication and normal oxygen saturations I would not repeat the patient's CT scan and exposing more radiation.  His blood work is unremarkable except for a mildly elevated lactic acid that I think is likely secondary to albuterol use as well as metformin use.  Does not appear septic last him with antibiotics at this time.  We'll repeat his blood gas after some fluid and likely discharge the patient home given his recent admission with a workup that was unremarkable.  Discussed all this with the patient and he understands the plan.      The patient has had a CT angiogram of his chest each month for the last 4-5 months.  I have listed the last 2 months above.  He did have PEs found back in December but had negative studies preceding and following these.  The study was limited technically by poor timing in by the patient's body habitus.      The patient is remained stable in the emergency department.  Vital signs of been stable.  His symptoms improved  somewhat.  We'll discharge him home and him follow with primary care doctor.  His lactate returned to normal.      Final diagnoses:   Chest pain, unspecified type            Damon Murrieta MD  03/23/18 [id number]

## 2018-03-27 LAB — BACTERIA SPEC AEROBE CULT: NORMAL

## 2018-03-28 LAB — BACTERIA SPEC AEROBE CULT: NORMAL

## 2018-03-29 ENCOUNTER — APPOINTMENT (OUTPATIENT)
Dept: GENERAL RADIOLOGY | Facility: HOSPITAL | Age: 38
End: 2018-03-29

## 2018-03-29 ENCOUNTER — HOSPITAL ENCOUNTER (OUTPATIENT)
Facility: HOSPITAL | Age: 38
Setting detail: OBSERVATION
Discharge: HOME | End: 2018-03-30
Attending: EMERGENCY MEDICINE | Admitting: INTERNAL MEDICINE

## 2018-03-29 DIAGNOSIS — R73.9 HYPERGLYCEMIA: ICD-10-CM

## 2018-03-29 DIAGNOSIS — R07.9 CHEST PAIN, UNSPECIFIED TYPE: Primary | ICD-10-CM

## 2018-03-29 LAB
ALBUMIN SERPL-MCNC: 3.6 G/DL (ref 3.4–4.8)
ALBUMIN/GLOB SERPL: 1.1 G/DL (ref 1.1–1.8)
ALP SERPL-CCNC: 66 U/L (ref 38–126)
ALT SERPL W P-5'-P-CCNC: 39 U/L (ref 21–72)
ANION GAP SERPL CALCULATED.3IONS-SCNC: 14 MMOL/L (ref 5–15)
AST SERPL-CCNC: 34 U/L (ref 17–59)
BASOPHILS # BLD AUTO: 0.02 10*3/MM3 (ref 0–0.2)
BASOPHILS NFR BLD AUTO: 0.3 % (ref 0–2)
BH CV LOWER ARTERIAL LEFT ABI RATIO: 0.9
BH CV LOWER ARTERIAL LEFT DORSALIS PEDIS SYS MAX: 159 MMHG
BH CV LOWER ARTERIAL LEFT POST TIBIAL SYS MAX: 142 MMHG
BH CV LOWER ARTERIAL RIGHT ABI RATIO: 0.96
BH CV LOWER ARTERIAL RIGHT DORSALIS PEDIS SYS MAX: 165 MMHG
BH CV LOWER ARTERIAL RIGHT POST TIBIAL SYS MAX: 169 MMHG
BILIRUB SERPL-MCNC: 0.2 MG/DL (ref 0.2–1.3)
BUN BLD-MCNC: 13 MG/DL (ref 7–21)
BUN/CREAT SERPL: 20.3 (ref 7–25)
CALCIUM SPEC-SCNC: 8.9 MG/DL (ref 8.4–10.2)
CHLORIDE SERPL-SCNC: 96 MMOL/L (ref 95–110)
CO2 SERPL-SCNC: 23 MMOL/L (ref 22–31)
CREAT BLD-MCNC: 0.64 MG/DL (ref 0.7–1.3)
DEPRECATED RDW RBC AUTO: 41.1 FL (ref 35.1–43.9)
EOSINOPHIL # BLD AUTO: 0.27 10*3/MM3 (ref 0–0.7)
EOSINOPHIL NFR BLD AUTO: 3.7 % (ref 0–7)
ERYTHROCYTE [DISTWIDTH] IN BLOOD BY AUTOMATED COUNT: 14 % (ref 11.5–14.5)
GFR SERPL CREATININE-BSD FRML MDRD: 139 ML/MIN/1.73 (ref 70–162)
GLOBULIN UR ELPH-MCNC: 3.2 GM/DL (ref 2.3–3.5)
GLUCOSE BLD-MCNC: 287 MG/DL (ref 60–100)
HCT VFR BLD AUTO: 37.5 % (ref 39–49)
HGB BLD-MCNC: 12.9 G/DL (ref 13.7–17.3)
IMM GRANULOCYTES # BLD: 0.07 10*3/MM3 (ref 0–0.02)
IMM GRANULOCYTES NFR BLD: 1 % (ref 0–0.5)
LYMPHOCYTES # BLD AUTO: 1.69 10*3/MM3 (ref 0.6–4.2)
LYMPHOCYTES NFR BLD AUTO: 23.3 % (ref 10–50)
MCH RBC QN AUTO: 27.9 PG (ref 26.5–34)
MCHC RBC AUTO-ENTMCNC: 34.4 G/DL (ref 31.5–36.3)
MCV RBC AUTO: 81 FL (ref 80–98)
MONOCYTES # BLD AUTO: 0.62 10*3/MM3 (ref 0–0.9)
MONOCYTES NFR BLD AUTO: 8.6 % (ref 0–12)
NEUTROPHILS # BLD AUTO: 4.57 10*3/MM3 (ref 2–8.6)
NEUTROPHILS NFR BLD AUTO: 63.1 % (ref 37–80)
NT-PROBNP SERPL-MCNC: <11.1 PG/ML (ref 0–450)
PLATELET # BLD AUTO: 173 10*3/MM3 (ref 150–450)
PMV BLD AUTO: 8.9 FL (ref 8–12)
POTASSIUM BLD-SCNC: 3.9 MMOL/L (ref 3.5–5.1)
PROT SERPL-MCNC: 6.8 G/DL (ref 6.3–8.6)
RBC # BLD AUTO: 4.63 10*6/MM3 (ref 4.37–5.74)
SODIUM BLD-SCNC: 133 MMOL/L (ref 137–145)
TROPONIN I SERPL-MCNC: <0.012 NG/ML
UPPER ARTERIAL LEFT ARM BRACHIAL SYS MAX: 176 MMHG
UPPER ARTERIAL RIGHT ARM BRACHIAL SYS MAX: 161 MMHG
WBC NRBC COR # BLD: 7.24 10*3/MM3 (ref 3.2–9.8)

## 2018-03-29 PROCEDURE — 25010000002 MORPHINE PER 10 MG: Performed by: EMERGENCY MEDICINE

## 2018-03-29 PROCEDURE — 71045 X-RAY EXAM CHEST 1 VIEW: CPT

## 2018-03-29 PROCEDURE — 84484 ASSAY OF TROPONIN QUANT: CPT | Performed by: EMERGENCY MEDICINE

## 2018-03-29 PROCEDURE — 93005 ELECTROCARDIOGRAM TRACING: CPT | Performed by: EMERGENCY MEDICINE

## 2018-03-29 PROCEDURE — 80053 COMPREHEN METABOLIC PANEL: CPT | Performed by: EMERGENCY MEDICINE

## 2018-03-29 PROCEDURE — 96374 THER/PROPH/DIAG INJ IV PUSH: CPT

## 2018-03-29 PROCEDURE — 99284 EMERGENCY DEPT VISIT MOD MDM: CPT

## 2018-03-29 PROCEDURE — 85025 COMPLETE CBC W/AUTO DIFF WBC: CPT | Performed by: EMERGENCY MEDICINE

## 2018-03-29 PROCEDURE — 83880 ASSAY OF NATRIURETIC PEPTIDE: CPT | Performed by: EMERGENCY MEDICINE

## 2018-03-29 PROCEDURE — 93010 ELECTROCARDIOGRAM REPORT: CPT | Performed by: INTERNAL MEDICINE

## 2018-03-29 RX ORDER — SODIUM CHLORIDE 0.9 % (FLUSH) 0.9 %
10 SYRINGE (ML) INJECTION AS NEEDED
Status: DISCONTINUED | OUTPATIENT
Start: 2018-03-29 | End: 2018-03-30 | Stop reason: HOSPADM

## 2018-03-29 RX ORDER — MORPHINE SULFATE 8 MG/ML
4 INJECTION INTRAMUSCULAR; INTRAVENOUS; SUBCUTANEOUS ONCE
Status: COMPLETED | OUTPATIENT
Start: 2018-03-29 | End: 2018-03-29

## 2018-03-29 RX ORDER — ASPIRIN 325 MG
325 TABLET ORAL ONCE
Status: COMPLETED | OUTPATIENT
Start: 2018-03-29 | End: 2018-03-29

## 2018-03-29 RX ORDER — NITROGLYCERIN 0.4 MG/1
0.4 TABLET SUBLINGUAL
Status: DISCONTINUED | OUTPATIENT
Start: 2018-03-29 | End: 2018-03-30 | Stop reason: HOSPADM

## 2018-03-29 RX ADMIN — MORPHINE SULFATE 4 MG: 8 INJECTION INTRAVENOUS at 23:24

## 2018-03-29 RX ADMIN — ASPIRIN 325 MG: 325 TABLET, COATED ORAL at 22:39

## 2018-03-30 VITALS
DIASTOLIC BLOOD PRESSURE: 79 MMHG | RESPIRATION RATE: 20 BRPM | TEMPERATURE: 96.8 F | HEART RATE: 72 BPM | SYSTOLIC BLOOD PRESSURE: 136 MMHG | BODY MASS INDEX: 44.1 KG/M2 | HEIGHT: 71 IN | WEIGHT: 315 LBS | OXYGEN SATURATION: 97 %

## 2018-03-30 PROBLEM — R07.9 CHEST PAIN: Status: ACTIVE | Noted: 2018-03-30

## 2018-03-30 PROBLEM — E11.9 DIABETES MELLITUS (HCC): Chronic | Status: ACTIVE | Noted: 2018-03-30

## 2018-03-30 PROBLEM — R07.9 CHEST PAIN: Chronic | Status: ACTIVE | Noted: 2018-03-30

## 2018-03-30 PROBLEM — E66.01 MORBID OBESITY: Chronic | Status: ACTIVE | Noted: 2018-03-08

## 2018-03-30 PROBLEM — I25.10 CAD (CORONARY ARTERY DISEASE): Chronic | Status: ACTIVE | Noted: 2018-03-30

## 2018-03-30 LAB
CK MB SERPL-CCNC: 3.38 NG/ML (ref 0–5)
CK SERPL-CCNC: 577 U/L (ref 55–170)
GLUCOSE BLDC GLUCOMTR-MCNC: 172 MG/DL (ref 70–130)
GLUCOSE BLDC GLUCOMTR-MCNC: 189 MG/DL (ref 70–130)
GLUCOSE BLDC GLUCOMTR-MCNC: 222 MG/DL (ref 70–130)
HOLD SPECIMEN: NORMAL
HOLD SPECIMEN: NORMAL
TROPONIN I SERPL-MCNC: <0.012 NG/ML
TROPONIN I SERPL-MCNC: <0.012 NG/ML
WHOLE BLOOD HOLD SPECIMEN: NORMAL
WHOLE BLOOD HOLD SPECIMEN: NORMAL

## 2018-03-30 PROCEDURE — 96375 TX/PRO/DX INJ NEW DRUG ADDON: CPT

## 2018-03-30 PROCEDURE — 84484 ASSAY OF TROPONIN QUANT: CPT | Performed by: EMERGENCY MEDICINE

## 2018-03-30 PROCEDURE — 84484 ASSAY OF TROPONIN QUANT: CPT | Performed by: INTERNAL MEDICINE

## 2018-03-30 PROCEDURE — G0378 HOSPITAL OBSERVATION PER HR: HCPCS

## 2018-03-30 PROCEDURE — 82962 GLUCOSE BLOOD TEST: CPT

## 2018-03-30 PROCEDURE — 63710000001 INSULIN DETEMIR PER 5 UNITS: Performed by: INTERNAL MEDICINE

## 2018-03-30 PROCEDURE — 25010000002 KETOROLAC TROMETHAMINE PER 15 MG: Performed by: INTERNAL MEDICINE

## 2018-03-30 PROCEDURE — 82553 CREATINE MB FRACTION: CPT | Performed by: INTERNAL MEDICINE

## 2018-03-30 PROCEDURE — 82550 ASSAY OF CK (CPK): CPT | Performed by: INTERNAL MEDICINE

## 2018-03-30 PROCEDURE — 96376 TX/PRO/DX INJ SAME DRUG ADON: CPT

## 2018-03-30 RX ORDER — SODIUM CHLORIDE 0.9 % (FLUSH) 0.9 %
1-10 SYRINGE (ML) INJECTION AS NEEDED
Status: DISCONTINUED | OUTPATIENT
Start: 2018-03-30 | End: 2018-03-30 | Stop reason: HOSPADM

## 2018-03-30 RX ORDER — ALBUTEROL SULFATE 90 UG/1
2 AEROSOL, METERED RESPIRATORY (INHALATION) EVERY 4 HOURS PRN
Status: DISCONTINUED | OUTPATIENT
Start: 2018-03-30 | End: 2018-03-30 | Stop reason: CLARIF

## 2018-03-30 RX ORDER — ONDANSETRON 2 MG/ML
4 INJECTION INTRAMUSCULAR; INTRAVENOUS EVERY 6 HOURS PRN
Status: DISCONTINUED | OUTPATIENT
Start: 2018-03-30 | End: 2018-03-30 | Stop reason: HOSPADM

## 2018-03-30 RX ORDER — CLOPIDOGREL BISULFATE 75 MG/1
75 TABLET ORAL DAILY
Status: DISCONTINUED | OUTPATIENT
Start: 2018-03-30 | End: 2018-03-30 | Stop reason: HOSPADM

## 2018-03-30 RX ORDER — AMLODIPINE BESYLATE 10 MG/1
10 TABLET ORAL ONCE
Status: COMPLETED | OUTPATIENT
Start: 2018-03-30 | End: 2018-03-30

## 2018-03-30 RX ORDER — ISOSORBIDE MONONITRATE 60 MG/1
120 TABLET, EXTENDED RELEASE ORAL ONCE
Status: COMPLETED | OUTPATIENT
Start: 2018-03-30 | End: 2018-03-30

## 2018-03-30 RX ORDER — ALBUTEROL SULFATE 2.5 MG/3ML
2.5 SOLUTION RESPIRATORY (INHALATION) EVERY 4 HOURS PRN
Status: DISCONTINUED | OUTPATIENT
Start: 2018-03-30 | End: 2018-03-30 | Stop reason: HOSPADM

## 2018-03-30 RX ORDER — TRAZODONE HYDROCHLORIDE 150 MG/1
300 TABLET ORAL NIGHTLY
Status: DISCONTINUED | OUTPATIENT
Start: 2018-03-30 | End: 2018-03-30 | Stop reason: HOSPADM

## 2018-03-30 RX ORDER — ATORVASTATIN CALCIUM 20 MG/1
20 TABLET, FILM COATED ORAL DAILY
Status: DISCONTINUED | OUTPATIENT
Start: 2018-03-30 | End: 2018-03-30 | Stop reason: HOSPADM

## 2018-03-30 RX ORDER — METOPROLOL SUCCINATE 100 MG/1
200 TABLET, EXTENDED RELEASE ORAL DAILY
Status: DISCONTINUED | OUTPATIENT
Start: 2018-03-30 | End: 2018-03-30 | Stop reason: HOSPADM

## 2018-03-30 RX ORDER — SERTRALINE HYDROCHLORIDE 25 MG/1
25 TABLET, FILM COATED ORAL DAILY
Status: DISCONTINUED | OUTPATIENT
Start: 2018-03-30 | End: 2018-03-30 | Stop reason: HOSPADM

## 2018-03-30 RX ORDER — ISOSORBIDE MONONITRATE 60 MG/1
120 TABLET, EXTENDED RELEASE ORAL DAILY
Status: DISCONTINUED | OUTPATIENT
Start: 2018-03-30 | End: 2018-03-30 | Stop reason: HOSPADM

## 2018-03-30 RX ORDER — ONDANSETRON 4 MG/1
4 TABLET, ORALLY DISINTEGRATING ORAL EVERY 6 HOURS PRN
Status: DISCONTINUED | OUTPATIENT
Start: 2018-03-30 | End: 2018-03-30 | Stop reason: HOSPADM

## 2018-03-30 RX ORDER — LISINOPRIL 40 MG/1
40 TABLET ORAL EVERY MORNING
Status: DISCONTINUED | OUTPATIENT
Start: 2018-03-30 | End: 2018-03-30 | Stop reason: HOSPADM

## 2018-03-30 RX ORDER — KETOROLAC TROMETHAMINE 15 MG/ML
15 INJECTION, SOLUTION INTRAMUSCULAR; INTRAVENOUS EVERY 6 HOURS PRN
Status: DISCONTINUED | OUTPATIENT
Start: 2018-03-30 | End: 2018-03-30 | Stop reason: HOSPADM

## 2018-03-30 RX ORDER — AMLODIPINE BESYLATE 10 MG/1
10 TABLET ORAL NIGHTLY
Status: DISCONTINUED | OUTPATIENT
Start: 2018-03-30 | End: 2018-03-30 | Stop reason: HOSPADM

## 2018-03-30 RX ORDER — RANOLAZINE 500 MG/1
1000 TABLET, EXTENDED RELEASE ORAL EVERY 12 HOURS SCHEDULED
Status: DISCONTINUED | OUTPATIENT
Start: 2018-03-30 | End: 2018-03-30 | Stop reason: HOSPADM

## 2018-03-30 RX ORDER — PANTOPRAZOLE SODIUM 40 MG/1
40 TABLET, DELAYED RELEASE ORAL NIGHTLY
Status: DISCONTINUED | OUTPATIENT
Start: 2018-03-30 | End: 2018-03-30 | Stop reason: HOSPADM

## 2018-03-30 RX ADMIN — LINAGLIPTIN 5 MG: 5 TABLET, FILM COATED ORAL at 09:19

## 2018-03-30 RX ADMIN — CLOPIDOGREL BISULFATE 75 MG: 75 TABLET ORAL at 09:19

## 2018-03-30 RX ADMIN — ISOSORBIDE MONONITRATE 120 MG: 60 TABLET, EXTENDED RELEASE ORAL at 01:11

## 2018-03-30 RX ADMIN — RANOLAZINE 1000 MG: 500 TABLET, FILM COATED, EXTENDED RELEASE ORAL at 03:36

## 2018-03-30 RX ADMIN — LISINOPRIL 40 MG: 40 TABLET ORAL at 05:42

## 2018-03-30 RX ADMIN — RIVAROXABAN 20 MG: 10 TABLET, FILM COATED ORAL at 09:18

## 2018-03-30 RX ADMIN — KETOROLAC TROMETHAMINE 15 MG: 15 INJECTION, SOLUTION INTRAMUSCULAR; INTRAVENOUS at 03:36

## 2018-03-30 RX ADMIN — ATORVASTATIN CALCIUM 20 MG: 20 TABLET, FILM COATED ORAL at 09:19

## 2018-03-30 RX ADMIN — SERTRALINE HYDROCHLORIDE 25 MG: 25 TABLET ORAL at 09:18

## 2018-03-30 RX ADMIN — ISOSORBIDE MONONITRATE 120 MG: 60 TABLET, EXTENDED RELEASE ORAL at 09:18

## 2018-03-30 RX ADMIN — METFORMIN HYDROCHLORIDE 1000 MG: 500 TABLET ORAL at 09:18

## 2018-03-30 RX ADMIN — METOPROLOL SUCCINATE 200 MG: 100 TABLET, EXTENDED RELEASE ORAL at 09:18

## 2018-03-30 RX ADMIN — AMLODIPINE BESYLATE 10 MG: 10 TABLET ORAL at 01:10

## 2018-03-30 RX ADMIN — PANTOPRAZOLE SODIUM 40 MG: 40 TABLET, DELAYED RELEASE ORAL at 03:36

## 2018-03-30 RX ADMIN — INSULIN DETEMIR 40 UNITS: 100 INJECTION, SOLUTION SUBCUTANEOUS at 03:38

## 2018-03-30 RX ADMIN — RANOLAZINE 1000 MG: 500 TABLET, FILM COATED, EXTENDED RELEASE ORAL at 09:18

## 2018-03-30 RX ADMIN — KETOROLAC TROMETHAMINE 15 MG: 15 INJECTION, SOLUTION INTRAMUSCULAR; INTRAVENOUS at 11:03

## 2018-04-02 RX ORDER — LANCETS
EACH MISCELLANEOUS
Qty: 100 EACH | Refills: 5 | Status: SHIPPED | OUTPATIENT
Start: 2018-04-02 | End: 2020-09-29 | Stop reason: SDUPTHER

## 2018-04-07 ENCOUNTER — HOSPITAL ENCOUNTER (EMERGENCY)
Facility: HOSPITAL | Age: 38
Discharge: HOME | End: 2018-04-08
Attending: FAMILY MEDICINE | Admitting: FAMILY MEDICINE

## 2018-04-07 DIAGNOSIS — R73.9 HYPERGLYCEMIA: Primary | ICD-10-CM

## 2018-04-07 LAB
ACETONE BLD QL: NEGATIVE
ALBUMIN SERPL-MCNC: 3.4 G/DL (ref 3.4–4.8)
ALBUMIN/GLOB SERPL: 1.1 G/DL (ref 1.1–1.8)
ALP SERPL-CCNC: 74 U/L (ref 38–126)
ALT SERPL W P-5'-P-CCNC: 55 U/L (ref 21–72)
ANION GAP SERPL CALCULATED.3IONS-SCNC: 8 MMOL/L (ref 5–15)
AST SERPL-CCNC: 35 U/L (ref 17–59)
BASOPHILS # BLD AUTO: 0.01 10*3/MM3 (ref 0–0.2)
BASOPHILS NFR BLD AUTO: 0.2 % (ref 0–2)
BILIRUB SERPL-MCNC: 0.2 MG/DL (ref 0.2–1.3)
BUN BLD-MCNC: 9 MG/DL (ref 7–21)
BUN/CREAT SERPL: 10.8 (ref 7–25)
CALCIUM SPEC-SCNC: 8.7 MG/DL (ref 8.4–10.2)
CHLORIDE SERPL-SCNC: 95 MMOL/L (ref 95–110)
CO2 SERPL-SCNC: 31 MMOL/L (ref 22–31)
CREAT BLD-MCNC: 0.83 MG/DL (ref 0.7–1.3)
DEPRECATED RDW RBC AUTO: 41.4 FL (ref 35.1–43.9)
EOSINOPHIL # BLD AUTO: 0.24 10*3/MM3 (ref 0–0.7)
EOSINOPHIL NFR BLD AUTO: 4.5 % (ref 0–7)
ERYTHROCYTE [DISTWIDTH] IN BLOOD BY AUTOMATED COUNT: 13.9 % (ref 11.5–14.5)
GFR SERPL CREATININE-BSD FRML MDRD: 103 ML/MIN/1.73 (ref 70–162)
GLOBULIN UR ELPH-MCNC: 3.2 GM/DL (ref 2.3–3.5)
GLUCOSE BLD-MCNC: 314 MG/DL (ref 60–100)
GLUCOSE BLDC GLUCOMTR-MCNC: 317 MG/DL (ref 70–130)
HCT VFR BLD AUTO: 37.6 % (ref 39–49)
HGB BLD-MCNC: 12.9 G/DL (ref 13.7–17.3)
IMM GRANULOCYTES # BLD: 0.06 10*3/MM3 (ref 0–0.02)
IMM GRANULOCYTES NFR BLD: 1.1 % (ref 0–0.5)
LYMPHOCYTES # BLD AUTO: 1.28 10*3/MM3 (ref 0.6–4.2)
LYMPHOCYTES NFR BLD AUTO: 23.8 % (ref 10–50)
MCH RBC QN AUTO: 27.9 PG (ref 26.5–34)
MCHC RBC AUTO-ENTMCNC: 34.3 G/DL (ref 31.5–36.3)
MCV RBC AUTO: 81.2 FL (ref 80–98)
MONOCYTES # BLD AUTO: 0.44 10*3/MM3 (ref 0–0.9)
MONOCYTES NFR BLD AUTO: 8.2 % (ref 0–12)
NEUTROPHILS # BLD AUTO: 3.35 10*3/MM3 (ref 2–8.6)
NEUTROPHILS NFR BLD AUTO: 62.2 % (ref 37–80)
PLATELET # BLD AUTO: 152 10*3/MM3 (ref 150–450)
PMV BLD AUTO: 8.8 FL (ref 8–12)
POTASSIUM BLD-SCNC: 3.8 MMOL/L (ref 3.5–5.1)
PROT SERPL-MCNC: 6.6 G/DL (ref 6.3–8.6)
RBC # BLD AUTO: 4.63 10*6/MM3 (ref 4.37–5.74)
SODIUM BLD-SCNC: 134 MMOL/L (ref 137–145)
WBC NRBC COR # BLD: 5.38 10*3/MM3 (ref 3.2–9.8)

## 2018-04-07 PROCEDURE — 80307 DRUG TEST PRSMV CHEM ANLYZR: CPT | Performed by: FAMILY MEDICINE

## 2018-04-07 PROCEDURE — 96360 HYDRATION IV INFUSION INIT: CPT

## 2018-04-07 PROCEDURE — 85025 COMPLETE CBC W/AUTO DIFF WBC: CPT | Performed by: FAMILY MEDICINE

## 2018-04-07 PROCEDURE — 82009 KETONE BODYS QUAL: CPT | Performed by: FAMILY MEDICINE

## 2018-04-07 PROCEDURE — 80053 COMPREHEN METABOLIC PANEL: CPT | Performed by: FAMILY MEDICINE

## 2018-04-07 PROCEDURE — 82962 GLUCOSE BLOOD TEST: CPT

## 2018-04-07 PROCEDURE — 99283 EMERGENCY DEPT VISIT LOW MDM: CPT

## 2018-04-07 PROCEDURE — 63710000001 INSULIN DETEMIR PER 5 UNITS: Performed by: FAMILY MEDICINE

## 2018-04-07 RX ORDER — CYCLOBENZAPRINE HCL 10 MG
10 TABLET ORAL 3 TIMES DAILY PRN
COMMUNITY
End: 2018-05-24

## 2018-04-07 RX ORDER — HYDROCODONE BITARTRATE AND ACETAMINOPHEN 10; 325 MG/1; MG/1
1 TABLET ORAL EVERY 6 HOURS PRN
COMMUNITY
End: 2018-04-19

## 2018-04-07 RX ORDER — SODIUM CHLORIDE 0.9 % (FLUSH) 0.9 %
10 SYRINGE (ML) INJECTION AS NEEDED
Status: DISCONTINUED | OUTPATIENT
Start: 2018-04-07 | End: 2018-04-08 | Stop reason: HOSPADM

## 2018-04-07 RX ADMIN — SODIUM CHLORIDE 1000 ML: 900 INJECTION, SOLUTION INTRAVENOUS at 23:06

## 2018-04-07 RX ADMIN — INSULIN DETEMIR 40 UNITS: 100 INJECTION, SOLUTION SUBCUTANEOUS at 23:53

## 2018-04-08 VITALS
HEART RATE: 89 BPM | BODY MASS INDEX: 44.1 KG/M2 | SYSTOLIC BLOOD PRESSURE: 144 MMHG | TEMPERATURE: 98.7 F | HEIGHT: 71 IN | RESPIRATION RATE: 20 BRPM | OXYGEN SATURATION: 96 % | DIASTOLIC BLOOD PRESSURE: 68 MMHG | WEIGHT: 315 LBS

## 2018-04-08 LAB
AMPHET+METHAMPHET UR QL: NEGATIVE
BARBITURATES UR QL SCN: NEGATIVE
BENZODIAZ UR QL SCN: NEGATIVE
CANNABINOIDS SERPL QL: NEGATIVE
COCAINE UR QL: NEGATIVE
METHADONE UR QL SCN: NEGATIVE
OPIATES UR QL: POSITIVE
OXYCODONE UR QL SCN: NEGATIVE

## 2018-04-08 RX ADMIN — Medication 10 ML: at 00:24

## 2018-04-08 NOTE — ED PROVIDER NOTES
Subjective     History provided by:  Patient   used: No    Hyperglycemia   Severity:  Moderate  Onset quality:  Gradual  Duration:  1 day  Timing:  Constant  Progression:  Unchanged  Chronicity:  Recurrent  Diabetes status:  Controlled with insulin  Relieved by:  Nothing  Ineffective treatments:  None tried  Associated symptoms: polyuria    Associated symptoms: no blurred vision, no confusion, no nausea and no shortness of breath    patient said that he checked his blood sugar and it was 510.   Review of Systems   Eyes: Negative for blurred vision.   Respiratory: Negative for shortness of breath.    Gastrointestinal: Negative for nausea.   Endocrine: Positive for polyuria.   Psychiatric/Behavioral: Negative for confusion.   All other systems reviewed and are negative.      Past Medical History:   Diagnosis Date   • Chest pain    • Chronic back pain    • Coronary artery disease    • Diabetes mellitus     10.6% was last A1C per patient   • Factor II deficiency    • Fatty liver    • GERD (gastroesophageal reflux disease)    • Hyperlipidemia    • Hypertension    • Morbid obesity    • Pulmonary embolism    • RLS (restless legs syndrome)    • Seizures     last one many years ago   • Sleep apnea     c-pap       Allergies   Allergen Reactions   • Glipizide Other (See Comments)     Slurred Speech   • Reglan [Metoclopramide] Anxiety   • Tramadol Other (See Comments)     seizures   • Risperdal [Risperidone] Other (See Comments)     Slurred speech       Past Surgical History:   Procedure Laterality Date   • ADENOIDECTOMY     • CARDIAC CATHETERIZATION N/A 3/15/2017    Procedure: Left Heart Cath;  Surgeon: Edwige Briceno MD;  Location: [facility] INVASIVE LOCATION;  Service:    • CARDIAC CATHETERIZATION N/A 3/15/2017    Procedure: Stent SOPHIA coronary;  Surgeon: Edwige Briceno MD;  Location: [facility] INVASIVE LOCATION;  Service:    • CARDIAC CATHETERIZATION N/A 3/1/2018    Procedure: Left Heart Cath;  Surgeon:  "Conor Parsons MD;  Location: [facility] CATH INVASIVE LOCATION;  Service:    • CHOLECYSTECTOMY      lap   • CORONARY ANGIOPLASTY WITH STENT PLACEMENT     • AR RT/LT HEART CATHETERS N/A 3/15/2017    Procedure: Percutaneous Coronary Intervention;  Surgeon: Edwige Briceno MD;  Location: [facility] CATH INVASIVE LOCATION;  Service: Cardiovascular   • TONSILLECTOMY     • TRANSESOPHAGEAL ECHOCARDIOGRAM (MONICA)         Family History   Problem Relation Age of Onset   • Heart disease Mother    • Hypertension Mother    • Hyperlipidemia Mother    • Coronary artery disease Mother    • Diabetes Mother    • Obesity Mother    • Kidney failure Mother    • Sleep apnea Father    • Cancer Paternal Grandfather        Social History     Social History   • Marital status:      Spouse name: Cori   • Number of children: 0     Occupational History   • Disabled      Social History Main Topics   • Smoking status: Former Smoker     Packs/day: 0.25     Years: 0.50     Types: Cigarettes     Quit date: 1997   • Smokeless tobacco: Current User     Types: Snuff      Comment: quit smoking 25 years ago; he does use snuff   • Alcohol use No   • Drug use: No   • Sexual activity: Yes     Partners: Female     Birth control/ protection: None     Other Topics Concern   • Not on file       /76   Pulse 90   Temp 98.7 °F (37.1 °C) (Oral)   Resp 18   Ht 180.3 cm (71\")   Wt (!) 213 kg (470 lb)   SpO2 95%   BMI 65.55 kg/m²     Objective   Physical Exam   Constitutional: He is oriented to person, place, and time. He appears well-developed and well-nourished.   HENT:   Head: Normocephalic.   Right Ear: External ear normal.   Left Ear: External ear normal.   Nose: Nose normal.   Mouth/Throat: Oropharynx is clear and moist.   Eyes: EOM are normal. Pupils are equal, round, and reactive to light.   Neck: Normal range of motion. Neck supple.   Cardiovascular: Normal rate, regular rhythm, normal heart sounds and intact distal pulses.  "   Pulmonary/Chest: Effort normal and breath sounds normal.   Abdominal: Soft. Bowel sounds are normal.   Neurological: He is alert and oriented to person, place, and time. He has normal reflexes.   Skin: Skin is warm. Capillary refill takes less than 2 seconds.   Psychiatric: He has a normal mood and affect. His behavior is normal. Judgment and thought content normal.   Nursing note and vitals reviewed.      Procedures         ED Course  ED Course        Labs Reviewed   COMPREHENSIVE METABOLIC PANEL - Abnormal; Notable for the following:        Result Value    Glucose 314 (*)     Sodium 134 (*)     All other components within normal limits   CBC WITH AUTO DIFFERENTIAL - Abnormal; Notable for the following:     Hemoglobin 12.9 (*)     Hematocrit 37.6 (*)     Immature Grans % 1.1 (*)     Immature Grans, Absolute 0.06 (*)     All other components within normal limits   POCT GLUCOSE FINGERSTICK - Abnormal; Notable for the following:     Glucose 317 (*)     All other components within normal limits   ACETONE - Normal   URINE DRUG SCREEN   POCT GLUCOSE FINGERSTICK   CBC AND DIFFERENTIAL    Narrative:     The following orders were created for panel order CBC & Differential.  Procedure                               Abnormality         Status                     ---------                               -----------         ------                     CBC Auto Differential[[id number]]        Abnormal            Final result                 Please view results for these tests on the individual orders.   KETONE BODIES SERUM    Narrative:     The following orders were created for panel order Ketone Bodies, Serum (Not performed at [facility]).  Procedure                               Abnormality         Status                     ---------                               -----------         ------                     Acetone[[id number]]                      Normal              Final result                 Please view results for these  tests on the individual orders.       No orders to display             MDM    Final diagnoses:   Hyperglycemia            Alan Root MD  04/08/18 0012

## 2018-04-13 ENCOUNTER — RESULTS ENCOUNTER (OUTPATIENT)
Dept: BARIATRICS/WEIGHT MGMT | Facility: CLINIC | Age: 38
End: 2018-04-13

## 2018-04-13 DIAGNOSIS — E66.9 DIABETES MELLITUS TYPE 2 IN OBESE (HCC): ICD-10-CM

## 2018-04-13 DIAGNOSIS — I10 UNCONTROLLED HYPERTENSION: ICD-10-CM

## 2018-04-13 DIAGNOSIS — K21.9 GASTROESOPHAGEAL REFLUX DISEASE, ESOPHAGITIS PRESENCE NOT SPECIFIED: ICD-10-CM

## 2018-04-13 DIAGNOSIS — E11.69 DIABETES MELLITUS TYPE 2 IN OBESE (HCC): ICD-10-CM

## 2018-04-13 DIAGNOSIS — E66.01 MORBID OBESITY WITH BMI OF 70 AND OVER, ADULT (HCC): ICD-10-CM

## 2018-04-13 DIAGNOSIS — E78.2 MIXED HYPERLIPIDEMIA: ICD-10-CM

## 2018-04-19 ENCOUNTER — LAB (OUTPATIENT)
Dept: LAB | Facility: HOSPITAL | Age: 38
End: 2018-04-19
Attending: NURSE PRACTITIONER

## 2018-04-19 ENCOUNTER — OFFICE VISIT (OUTPATIENT)
Dept: BARIATRICS/WEIGHT MGMT | Facility: CLINIC | Age: 38
End: 2018-04-19

## 2018-04-19 VITALS
WEIGHT: 315 LBS | DIASTOLIC BLOOD PRESSURE: 95 MMHG | BODY MASS INDEX: 47.74 KG/M2 | HEART RATE: 91 BPM | OXYGEN SATURATION: 100 % | SYSTOLIC BLOOD PRESSURE: 155 MMHG | HEIGHT: 68 IN | TEMPERATURE: 98.2 F

## 2018-04-19 DIAGNOSIS — I10 UNCONTROLLED HYPERTENSION: ICD-10-CM

## 2018-04-19 DIAGNOSIS — E11.69 DIABETES MELLITUS TYPE 2 IN OBESE (HCC): ICD-10-CM

## 2018-04-19 DIAGNOSIS — E66.01 MORBID OBESITY WITH BMI OF 70 AND OVER, ADULT (HCC): Primary | ICD-10-CM

## 2018-04-19 DIAGNOSIS — E78.2 MIXED HYPERLIPIDEMIA: ICD-10-CM

## 2018-04-19 DIAGNOSIS — E66.9 DIABETES MELLITUS TYPE 2 IN OBESE (HCC): ICD-10-CM

## 2018-04-19 DIAGNOSIS — E66.01 MORBID OBESITY WITH BMI OF 70 AND OVER, ADULT (HCC): ICD-10-CM

## 2018-04-19 LAB — TSH SERPL DL<=0.05 MIU/L-ACNC: 1.14 MIU/ML (ref 0.47–4.68)

## 2018-04-19 PROCEDURE — 99212 OFFICE O/P EST SF 10 MIN: CPT | Performed by: NURSE PRACTITIONER

## 2018-04-19 PROCEDURE — 84443 ASSAY THYROID STIM HORMONE: CPT | Performed by: NURSE PRACTITIONER

## 2018-04-19 PROCEDURE — 36415 COLL VENOUS BLD VENIPUNCTURE: CPT

## 2018-04-19 NOTE — PATIENT INSTRUCTIONS
Yordy Hansen has done well  this month with healthy changes. Patient has lost 3 pounds. Today we discussed healthy changes in lifestyle, diet, and exercise.   Handout provided on portion sizes/control/reading nutrition labels.   Intensive behavioral therapy for obesity was done today.   Goals for this month are: 3 meals per day with protein at each; eat protein first, use smaller plate; exercise as advised.  Follow up in one month with Dr. oWodward to discuss if surgical candidate at this facility once BMI is to 60 or less.

## 2018-04-19 NOTE — PROGRESS NOTES
"Subjective   Yordy Hansen is a 37 y.o. male.     History of Present Illness   Yordy Hansen is here with morbid obesity and desires to have surgery for weight loss. He is working on getting his BMI to 60 or less. This is the patient's 2nd visit to the office.  Patient has been exercising by walking daily for 30 minutes.  Patient has been making health dietary choices and eating 3 meals per day with protein at each. Patient is drinking 64 plus ounces of water per day.   Vitals:    04/19/18 1342   BP: 155/95   BP Location: Right arm   Patient Position: Sitting   Cuff Size: Adult   Pulse: 91   Temp: 98.2 °F (36.8 °C)   SpO2: 100%   Weight: (!) 217 kg (478 lb 6.4 oz)   Height: 172.7 cm (68\")       The following portions of the patient's history were reviewed and updated as appropriate: allergies, current medications, past family history, past medical history, past social history, past surgical history and problem list.    Review of Systems   Constitutional: Negative for activity change, appetite change and fatigue.   HENT: Negative.    Eyes: Negative.    Respiratory: Negative.  Negative for apnea, cough, chest tightness and shortness of breath.    Cardiovascular: Negative.  Negative for chest pain, palpitations and leg swelling.   Gastrointestinal: Negative.  Negative for abdominal pain, anal bleeding, constipation, nausea and vomiting.   Endocrine: Negative.         Blood sugar has been up over 500 this past month   Genitourinary: Negative.    Musculoskeletal: Positive for back pain. Negative for arthralgias.   Skin: Negative.    Allergic/Immunologic: Negative.    Neurological: Negative.  Negative for seizures and syncope.   Hematological: Negative.  Does not bruise/bleed easily.   Psychiatric/Behavioral: Positive for sleep disturbance. Negative for dysphoric mood and self-injury.       Objective   Physical Exam   Constitutional: He is oriented to person, place, and time. Vital signs are normal. He appears " well-developed and well-nourished. He is cooperative. No distress.   HENT:   Head: Normocephalic and atraumatic.   Nose: Nose normal.   Mouth/Throat: Oropharynx is clear and moist. No oropharyngeal exudate or tonsillar abscesses.   Eyes: Conjunctivae, EOM and lids are normal. Pupils are equal, round, and reactive to light. Right eye exhibits no discharge. Left eye exhibits no discharge.   Neck: Trachea normal. Neck supple. No JVD present. Carotid bruit is not present. No no neck rigidity. No tracheal deviation present. No thyromegaly present.   Short and thick neck   Cardiovascular: Normal rate, regular rhythm, S1 normal, S2 normal and normal heart sounds.    Pulmonary/Chest: Effort normal. No stridor. No respiratory distress. He has decreased breath sounds in the right middle field, the right lower field, the left middle field and the left lower field. He has no wheezes. He has no rales.   Abdominal: Soft. Bowel sounds are normal. He exhibits no distension. There is no tenderness.   Obese; large pannus   Musculoskeletal: He exhibits edema.        Right shoulder: He exhibits normal strength.   2+ edema noted to lower legs (left worse than right leg; chronic)   Lymphadenopathy:     He has no cervical adenopathy.   Neurological: He is alert and oriented to person, place, and time. He has normal strength. No cranial nerve deficit.   Skin: Skin is warm, dry and intact. No rash noted.   Psychiatric: He has a normal mood and affect. His speech is normal and behavior is normal.   Alert and oriented x 3   Vitals reviewed.      Assessment/Plan   Yordy was seen today for follow-up, obesity, nutrition counseling and weight loss.    Diagnoses and all orders for this visit:    Morbid obesity with BMI of 70 and over, adult          Yordy Hansen has done well  this month with healthy changes. Patient has lost 3 pounds. Today we discussed healthy changes in lifestyle, diet, and exercise.   Handout provided on portion  sizes/control/reading nutrition labels.   Intensive behavioral therapy for obesity was done today.   Goals for this month are: 3 meals per day with protein at each; eat protein first, use smaller plate; exercise as advised.  Follow up in one month with Dr. Woodward to discuss if surgical candidate at this facility once BMI is to 60 or less.

## 2018-04-20 ENCOUNTER — TELEPHONE (OUTPATIENT)
Dept: BARIATRICS/WEIGHT MGMT | Facility: CLINIC | Age: 38
End: 2018-04-20

## 2018-04-24 ENCOUNTER — HOSPITAL ENCOUNTER (EMERGENCY)
Facility: HOSPITAL | Age: 38
Discharge: HOME | End: 2018-04-25
Attending: FAMILY MEDICINE | Admitting: FAMILY MEDICINE

## 2018-04-24 DIAGNOSIS — R09.1 PLEURISY: Primary | ICD-10-CM

## 2018-04-24 PROCEDURE — 96372 THER/PROPH/DIAG INJ SC/IM: CPT

## 2018-04-24 PROCEDURE — 93010 ELECTROCARDIOGRAM REPORT: CPT | Performed by: INTERNAL MEDICINE

## 2018-04-24 PROCEDURE — 93005 ELECTROCARDIOGRAM TRACING: CPT

## 2018-04-24 PROCEDURE — 99284 EMERGENCY DEPT VISIT MOD MDM: CPT

## 2018-04-24 PROCEDURE — 93005 ELECTROCARDIOGRAM TRACING: CPT | Performed by: FAMILY MEDICINE

## 2018-04-25 ENCOUNTER — APPOINTMENT (OUTPATIENT)
Dept: GENERAL RADIOLOGY | Facility: HOSPITAL | Age: 38
End: 2018-04-25

## 2018-04-25 ENCOUNTER — APPOINTMENT (OUTPATIENT)
Dept: CT IMAGING | Facility: HOSPITAL | Age: 38
End: 2018-04-25

## 2018-04-25 VITALS
OXYGEN SATURATION: 97 % | HEIGHT: 71 IN | WEIGHT: 315 LBS | SYSTOLIC BLOOD PRESSURE: 169 MMHG | TEMPERATURE: 97.5 F | RESPIRATION RATE: 20 BRPM | BODY MASS INDEX: 44.1 KG/M2 | HEART RATE: 98 BPM | DIASTOLIC BLOOD PRESSURE: 80 MMHG

## 2018-04-25 LAB
ALBUMIN SERPL-MCNC: 3.3 G/DL (ref 3.4–4.8)
ALBUMIN/GLOB SERPL: 1 G/DL (ref 1.1–1.8)
ALP SERPL-CCNC: 73 U/L (ref 38–126)
ALT SERPL W P-5'-P-CCNC: 36 U/L (ref 21–72)
ANION GAP SERPL CALCULATED.3IONS-SCNC: 13 MMOL/L (ref 5–15)
AST SERPL-CCNC: 29 U/L (ref 17–59)
BASOPHILS # BLD AUTO: 0.01 10*3/MM3 (ref 0–0.2)
BASOPHILS NFR BLD AUTO: 0.1 % (ref 0–2)
BILIRUB SERPL-MCNC: 0.2 MG/DL (ref 0.2–1.3)
BUN BLD-MCNC: 16 MG/DL (ref 7–21)
BUN/CREAT SERPL: 21.9 (ref 7–25)
CALCIUM SPEC-SCNC: 8.9 MG/DL (ref 8.4–10.2)
CHLORIDE SERPL-SCNC: 97 MMOL/L (ref 95–110)
CK MB SERPL-CCNC: 3.06 NG/ML (ref 0–5)
CK SERPL-CCNC: 294 U/L (ref 55–170)
CO2 SERPL-SCNC: 27 MMOL/L (ref 22–31)
CREAT BLD-MCNC: 0.73 MG/DL (ref 0.7–1.3)
DEPRECATED RDW RBC AUTO: 39.8 FL (ref 35.1–43.9)
EOSINOPHIL # BLD AUTO: 0.25 10*3/MM3 (ref 0–0.7)
EOSINOPHIL NFR BLD AUTO: 3.5 % (ref 0–7)
ERYTHROCYTE [DISTWIDTH] IN BLOOD BY AUTOMATED COUNT: 14 % (ref 11.5–14.5)
GFR SERPL CREATININE-BSD FRML MDRD: 120 ML/MIN/1.73 (ref 60–162)
GLOBULIN UR ELPH-MCNC: 3.4 GM/DL (ref 2.3–3.5)
GLUCOSE BLD-MCNC: 285 MG/DL (ref 60–100)
HCT VFR BLD AUTO: 37.1 % (ref 39–49)
HGB BLD-MCNC: 12.6 G/DL (ref 13.7–17.3)
HOLD SPECIMEN: NORMAL
HOLD SPECIMEN: NORMAL
IMM GRANULOCYTES # BLD: 0.08 10*3/MM3 (ref 0–0.02)
IMM GRANULOCYTES NFR BLD: 1.1 % (ref 0–0.5)
INR PPP: 0.97 (ref 0.8–1.2)
LIPASE SERPL-CCNC: 82 U/L (ref 23–300)
LYMPHOCYTES # BLD AUTO: 1.69 10*3/MM3 (ref 0.6–4.2)
LYMPHOCYTES NFR BLD AUTO: 23.5 % (ref 10–50)
MCH RBC QN AUTO: 26.9 PG (ref 26.5–34)
MCHC RBC AUTO-ENTMCNC: 34 G/DL (ref 31.5–36.3)
MCV RBC AUTO: 79.3 FL (ref 80–98)
MONOCYTES # BLD AUTO: 0.7 10*3/MM3 (ref 0–0.9)
MONOCYTES NFR BLD AUTO: 9.7 % (ref 0–12)
NEUTROPHILS # BLD AUTO: 4.46 10*3/MM3 (ref 2–8.6)
NEUTROPHILS NFR BLD AUTO: 62.1 % (ref 37–80)
NRBC BLD MANUAL-RTO: 0 /100 WBC (ref 0–0)
NT-PROBNP SERPL-MCNC: 19.8 PG/ML (ref 0–450)
PLATELET # BLD AUTO: 164 10*3/MM3 (ref 150–450)
PMV BLD AUTO: 8.9 FL (ref 8–12)
POTASSIUM BLD-SCNC: 3.9 MMOL/L (ref 3.5–5.1)
PROT SERPL-MCNC: 6.7 G/DL (ref 6.3–8.6)
PROTHROMBIN TIME: 12.7 SECONDS (ref 11.1–15.3)
RBC # BLD AUTO: 4.68 10*6/MM3 (ref 4.37–5.74)
SODIUM BLD-SCNC: 137 MMOL/L (ref 137–145)
TROPONIN I SERPL-MCNC: <0.012 NG/ML
TROPONIN I SERPL-MCNC: <0.012 NG/ML
WBC NRBC COR # BLD: 7.19 10*3/MM3 (ref 3.2–9.8)
WHOLE BLOOD HOLD SPECIMEN: NORMAL
WHOLE BLOOD HOLD SPECIMEN: NORMAL

## 2018-04-25 PROCEDURE — 96372 THER/PROPH/DIAG INJ SC/IM: CPT

## 2018-04-25 PROCEDURE — 85610 PROTHROMBIN TIME: CPT | Performed by: FAMILY MEDICINE

## 2018-04-25 PROCEDURE — 0 IOPAMIDOL PER 1 ML: Performed by: FAMILY MEDICINE

## 2018-04-25 PROCEDURE — 83880 ASSAY OF NATRIURETIC PEPTIDE: CPT | Performed by: FAMILY MEDICINE

## 2018-04-25 PROCEDURE — 94640 AIRWAY INHALATION TREATMENT: CPT

## 2018-04-25 PROCEDURE — 85025 COMPLETE CBC W/AUTO DIFF WBC: CPT | Performed by: FAMILY MEDICINE

## 2018-04-25 PROCEDURE — 84484 ASSAY OF TROPONIN QUANT: CPT | Performed by: FAMILY MEDICINE

## 2018-04-25 PROCEDURE — 82550 ASSAY OF CK (CPK): CPT | Performed by: FAMILY MEDICINE

## 2018-04-25 PROCEDURE — 80053 COMPREHEN METABOLIC PANEL: CPT | Performed by: FAMILY MEDICINE

## 2018-04-25 PROCEDURE — 71275 CT ANGIOGRAPHY CHEST: CPT

## 2018-04-25 PROCEDURE — 82553 CREATINE MB FRACTION: CPT | Performed by: FAMILY MEDICINE

## 2018-04-25 PROCEDURE — 71046 X-RAY EXAM CHEST 2 VIEWS: CPT

## 2018-04-25 PROCEDURE — 25010000002 KETOROLAC TROMETHAMINE PER 15 MG: Performed by: FAMILY MEDICINE

## 2018-04-25 PROCEDURE — 83690 ASSAY OF LIPASE: CPT | Performed by: FAMILY MEDICINE

## 2018-04-25 RX ORDER — ASPIRIN 325 MG
325 TABLET ORAL ONCE
Status: COMPLETED | OUTPATIENT
Start: 2018-04-25 | End: 2018-04-25

## 2018-04-25 RX ORDER — IPRATROPIUM BROMIDE AND ALBUTEROL SULFATE 2.5; .5 MG/3ML; MG/3ML
3 SOLUTION RESPIRATORY (INHALATION) ONCE
Status: COMPLETED | OUTPATIENT
Start: 2018-04-25 | End: 2018-04-25

## 2018-04-25 RX ORDER — KETOROLAC TROMETHAMINE 30 MG/ML
60 INJECTION, SOLUTION INTRAMUSCULAR; INTRAVENOUS ONCE
Status: COMPLETED | OUTPATIENT
Start: 2018-04-25 | End: 2018-04-25

## 2018-04-25 RX ORDER — SODIUM CHLORIDE 0.9 % (FLUSH) 0.9 %
10 SYRINGE (ML) INJECTION AS NEEDED
Status: DISCONTINUED | OUTPATIENT
Start: 2018-04-25 | End: 2018-04-25 | Stop reason: HOSPADM

## 2018-04-25 RX ORDER — HYDROCODONE BITARTRATE AND ACETAMINOPHEN 10; 325 MG/1; MG/1
1 TABLET ORAL EVERY 6 HOURS PRN
Qty: 15 TABLET | Refills: 0 | Status: SHIPPED | OUTPATIENT
Start: 2018-04-25 | End: 2018-05-24

## 2018-04-25 RX ADMIN — IPRATROPIUM BROMIDE AND ALBUTEROL SULFATE 3 ML: 2.5; .5 SOLUTION RESPIRATORY (INHALATION) at 02:52

## 2018-04-25 RX ADMIN — IOPAMIDOL 94 ML: 755 INJECTION, SOLUTION INTRAVENOUS at 05:13

## 2018-04-25 RX ADMIN — ASPIRIN 325 MG: 325 TABLET ORAL at 01:51

## 2018-04-25 RX ADMIN — KETOROLAC TROMETHAMINE 60 MG: 60 INJECTION, SOLUTION INTRAMUSCULAR at 03:49

## 2018-04-25 NOTE — ED NOTES
SOA and chest pain started around 3-4 hours ago. Chest pain is on right side of chest and patient describes pain as needle-like sharp pain that stays on right chest wall. Patient reports taking 3 nitros and 1 81mg baby aspirin while at home. Patient also took a breathing tx at home.      Pippa Sykes RN  04/24/18 3197

## 2018-04-25 NOTE — ED NOTES
Unsuccessful IV attempt x 3 by laisha RN and x 4 by GARY Carlton. Dr. Loya informed.     Ligia Oliver, GARY  04/25/18 6026

## 2018-04-25 NOTE — ED NOTES
Pt sitting up in chair. Has removed cardiac monitor and BP cuff multiple times. Reminded of need for nurse to be able to monitor status. Pt verbalized understanding, but states he is just watching what all is going on. Encouraged to leave monitors on.     Ligia Oliver RN  04/25/18 8451

## 2018-04-25 NOTE — ED PROVIDER NOTES
Subjective     Chest Pain   Pain location:  Substernal area  Pain quality: sharp    Pain radiates to:  Does not radiate  Pain severity:  Moderate  Onset quality:  Sudden  Duration:  1 day  Timing:  Intermittent  Progression:  Waxing and waning  Chronicity:  Recurrent  Context: breathing    Relieved by:  Rest  Worsened by:  Coughing  Ineffective treatments:  None tried  Associated symptoms: cough and shortness of breath    Associated symptoms: no abdominal pain, no altered mental status, no back pain, no claudication, no diaphoresis, no dizziness, no dysphagia, no fatigue, no fever, no headache, no nausea, no near-syncope, no numbness, no orthopnea, no palpitations, no syncope, no vomiting and no weakness    Cough:     Cough characteristics:  Non-productive    Severity:  Mild  Risk factors: coronary artery disease, male sex and obesity    Shortness of Breath   Associated symptoms: chest pain and cough    Associated symptoms: no abdominal pain, no claudication, no diaphoresis, no ear pain, no fever, no headaches, no neck pain, no rash, no sore throat, no syncope, no vomiting and no wheezing        Review of Systems   Constitutional: Negative for appetite change, chills, diaphoresis, fatigue and fever.   HENT: Negative for congestion, ear discharge, ear pain, nosebleeds, rhinorrhea, sinus pressure, sore throat and trouble swallowing.    Eyes: Negative for discharge and redness.   Respiratory: Positive for cough and shortness of breath. Negative for apnea, chest tightness and wheezing.    Cardiovascular: Positive for chest pain. Negative for palpitations, orthopnea, claudication, syncope and near-syncope.   Gastrointestinal: Negative for abdominal pain, diarrhea, nausea and vomiting.   Endocrine: Negative for polyuria.   Genitourinary: Negative for dysuria, frequency and urgency.   Musculoskeletal: Negative for back pain, myalgias and neck pain.   Skin: Negative for color change and rash.   Allergic/Immunologic:  Negative for immunocompromised state.   Neurological: Negative for dizziness, seizures, syncope, weakness, light-headedness, numbness and headaches.   Hematological: Negative for adenopathy. Does not bruise/bleed easily.   Psychiatric/Behavioral: Negative for behavioral problems and confusion.   All other systems reviewed and are negative.      Past Medical History:   Diagnosis Date   • Chest pain    • Chronic back pain    • Coronary artery disease    • Diabetes mellitus     10.6% was last A1C per patient   • Factor II deficiency    • Fatty liver    • GERD (gastroesophageal reflux disease)    • Hyperlipidemia    • Hypertension    • Morbid obesity    • Pulmonary embolism    • RLS (restless legs syndrome)    • Seizures     last one many years ago   • Sleep apnea     c-pap       Allergies   Allergen Reactions   • Glipizide Other (See Comments)     Slurred Speech   • Reglan [Metoclopramide] Anxiety   • Tramadol Other (See Comments)     seizures   • Risperdal [Risperidone] Other (See Comments)     Slurred speech       Past Surgical History:   Procedure Laterality Date   • ADENOIDECTOMY     • CARDIAC CATHETERIZATION N/A 3/15/2017    Procedure: Left Heart Cath;  Surgeon: Edwige Briceno MD;  Location: [facility] INVASIVE LOCATION;  Service:    • CARDIAC CATHETERIZATION N/A 3/15/2017    Procedure: Stent SOPHIA coronary;  Surgeon: Edwige Briceno MD;  Location: [facility] INVASIVE LOCATION;  Service:    • CARDIAC CATHETERIZATION N/A 3/1/2018    Procedure: Left Heart Cath;  Surgeon: Conor Parsons MD;  Location: [facility] CATH INVASIVE LOCATION;  Service:    • CHOLECYSTECTOMY      lap   • CORONARY ANGIOPLASTY WITH STENT PLACEMENT     • NE RT/LT HEART CATHETERS N/A 3/15/2017    Procedure: Percutaneous Coronary Intervention;  Surgeon: Edwige Briceno MD;  Location: [facility] INVASIVE LOCATION;  Service: Cardiovascular   • TONSILLECTOMY     • TRANSESOPHAGEAL ECHOCARDIOGRAM (MONICA)         Family History   Problem Relation Age of Onset    • Heart disease Mother    • Hypertension Mother    • Hyperlipidemia Mother    • Coronary artery disease Mother    • Diabetes Mother    • Obesity Mother    • Kidney failure Mother    • Sleep apnea Father    • Cancer Paternal Grandfather        Social History     Social History   • Marital status:      Spouse name: Cori   • Number of children: 0     Occupational History   • Disabled      Social History Main Topics   • Smoking status: Former Smoker     Packs/day: 0.25     Years: 0.50     Types: Cigarettes     Quit date: 1997   • Smokeless tobacco: Current User     Types: Snuff      Comment: quit smoking 25 years ago; he does use snuff   • Alcohol use No   • Drug use: No   • Sexual activity: Yes     Partners: Female     Birth control/ protection: None     Other Topics Concern   • Not on file           Objective   Physical Exam   Constitutional: He is oriented to person, place, and time. He appears well-developed and well-nourished.   HENT:   Head: Normocephalic and atraumatic.   Nose: Nose normal.   Mouth/Throat: Oropharynx is clear and moist.   Eyes: Conjunctivae and EOM are normal. Pupils are equal, round, and reactive to light. Right eye exhibits no discharge. Left eye exhibits no discharge. No scleral icterus.   Neck: Normal range of motion. Neck supple. No tracheal deviation present.   Cardiovascular: Normal rate, regular rhythm and normal heart sounds.    No murmur heard.  Pulmonary/Chest: Effort normal and breath sounds normal. No stridor. No respiratory distress. He has no wheezes. He has no rales.   Abdominal: Soft. Bowel sounds are normal. He exhibits no distension and no mass. There is no tenderness. There is no rebound and no guarding.   Musculoskeletal: He exhibits no edema.   Neurological: He is alert and oriented to person, place, and time. Coordination normal.   Skin: Skin is warm and dry. No rash noted. No erythema.   Psychiatric: He has a normal mood and affect. His behavior is normal.  Thought content normal.   Nursing note and vitals reviewed.      Procedures         ED Course  ED Course   Comment By Time   Heart cath 3/1/18.  Conclusion : Previously placed stent in the LAD is widely patent, all other coronary artery looks okay  Plan: Look for noncardiac cause of chest pain and consideration of bariatric surgery for morbid obesity    Red Loya MD 04/25 0236   Patient breathing comfortably, denies chest pain other than some pleuritic pain with coughing.   Red Loya MD 04/25 0669        Labs Reviewed   COMPREHENSIVE METABOLIC PANEL - Abnormal; Notable for the following:        Result Value    Glucose 285 (*)     Albumin 3.30 (*)     A/G Ratio 1.0 (*)     All other components within normal limits   CK - Abnormal; Notable for the following:     Creatine Kinase 294 (*)     All other components within normal limits   CBC WITH AUTO DIFFERENTIAL - Abnormal; Notable for the following:     Hemoglobin 12.6 (*)     Hematocrit 37.1 (*)     MCV 79.3 (*)     Immature Grans % 1.1 (*)     Immature Grans, Absolute 0.08 (*)     All other components within normal limits   TROPONIN (IN-HOUSE) - Normal   TROPONIN (IN-HOUSE) - Normal   BNP (IN-HOUSE) - Normal   PROTIME-INR - Normal    Narrative:     Therapeutic range for most indications is 2.0-3.0 INR,  or 2.5-3.5 for mechanical heart valves.   CK MB - Normal   LIPASE - Normal   RAINBOW DRAW    Narrative:     The following orders were created for panel order [facility] Draw.  Procedure                               Abnormality         Status                     ---------                               -----------         ------                     Light Blue Top[[id number]]                                   Final result               Green Top (Gel)[[id number]]                                  Final result               Lavender Top[[id number]]                                     Final result               Gold Top - SST[[id number]]                                    Final result                 Please view results for these tests on the individual orders.   CBC AND DIFFERENTIAL    Narrative:     The following orders were created for panel order CBC & Differential.  Procedure                               Abnormality         Status                     ---------                               -----------         ------                     Scan Slide[[id number]]                                                                  CBC Auto Differential[[id number]]        Abnormal            Final result                 Please view results for these tests on the individual orders.   LIGHT BLUE TOP   GREEN TOP   LAVENDER TOP   GOLD TOP - SST       CT Angiogram Chest With Contrast   Final Result   1. Examination is suboptimal for evaluation of pulmonary embolus   with no evidence of a large or central pulmonary embolus but   cannot fully exclude small peripheral pulmonary emboli on this   exam as above.   2. Mild splenomegaly.      Electronically signed by:  Pelon Anderson  4/25/2018 5:28 AM   CDT Workstation: [id number]      XR Chest 2 View   Final Result   No active disease.      Electronically signed by:  Pelon Anderson  4/25/2018 1:15 AM   CDT Workstation: [id number]              Request # : [id number]        [facility]    Final diagnoses:   Pleurisy            Red Loya MD  04/25/18 0609       Red Loya MD  04/25/18 0610

## 2018-04-25 NOTE — DISCHARGE INSTRUCTIONS
Pleurisy  Pleurisy is irritation and swelling (inflammation) of the linings of your lungs (pleura). This can cause pain in your chest, back, or shoulder. It can also cause trouble breathing.  Follow these instructions at home:  Medicines   · Take over-the-counter and prescription medicines only as told by your doctor.  · If you were prescribed antibiotic medicine, take it as told by your doctor. Do not stop taking the antibiotic even if you start to feel better.  Activity   · Rest and return to your normal activities as told by your doctor. Ask your doctor what activities are safe for you.  · Do not drive or use heavy machinery while taking prescription pain medicine.  General instructions   · Watch for any changes in your condition.  · Take deep breaths often, even if it is painful. This can help prevent lung problems.  · When lying down, lie on your painful side. This may help you feel less pain.  · Do not smoke. If you need help quitting, ask your doctor.  · Keep all follow-up visits as told by your doctor. This is important.  Contact a doctor if:  · You have pain that:  ¨ Gets worse.  ¨ Does not get better with medicine.  ¨ Lasts for more than 1 week.  · You have a fever or chills.  · You have a cough that does not get better at home.  · You have trouble breathing that does not get better at home.  · You cough up liquid that looks like pus (purulent secretions).  Get help right away if:  · Your lips, fingernails, or toenails turn dark or turn blue.  · You cough up blood.  · You have trouble breathing that gets worse.  · You are making loud noises when you breathe (wheezing) and this gets worse.  · You have pain that spreads to your neck, arms, or jaw.  · You get a rash.  · You throw up (vomit).  · You pass out (faint).  Summary  · Pleurisy is irritation and swelling (inflammation) of the linings of your lungs (pleura).  · Pleurisy can cause pain and trouble breathing.  · If you have a cough that does not get  better at home, contact your doctor.  · Get help right away if you are having trouble breathing and it is getting worse.  This information is not intended to replace advice given to you by your health care provider. Make sure you discuss any questions you have with your health care provider.  Document Released: 11/30/2009 Document Revised: 09/11/2017 Document Reviewed: 09/11/2017  [employer] Patient Education © 2017 [employer]

## 2018-05-07 ENCOUNTER — APPOINTMENT (OUTPATIENT)
Dept: GENERAL RADIOLOGY | Facility: HOSPITAL | Age: 38
End: 2018-05-07

## 2018-05-07 ENCOUNTER — HOSPITAL ENCOUNTER (EMERGENCY)
Facility: HOSPITAL | Age: 38
Discharge: HOME | End: 2018-05-07
Attending: EMERGENCY MEDICINE | Admitting: EMERGENCY MEDICINE

## 2018-05-07 ENCOUNTER — APPOINTMENT (OUTPATIENT)
Dept: CT IMAGING | Facility: HOSPITAL | Age: 38
End: 2018-05-07

## 2018-05-07 VITALS
DIASTOLIC BLOOD PRESSURE: 94 MMHG | BODY MASS INDEX: 44.1 KG/M2 | WEIGHT: 315 LBS | RESPIRATION RATE: 18 BRPM | HEART RATE: 96 BPM | HEIGHT: 71 IN | SYSTOLIC BLOOD PRESSURE: 164 MMHG | TEMPERATURE: 98.8 F | OXYGEN SATURATION: 97 %

## 2018-05-07 DIAGNOSIS — R05.9 COUGH: ICD-10-CM

## 2018-05-07 DIAGNOSIS — J01.00 ACUTE NON-RECURRENT MAXILLARY SINUSITIS: ICD-10-CM

## 2018-05-07 DIAGNOSIS — J20.9 ACUTE BRONCHITIS, UNSPECIFIED ORGANISM: Primary | ICD-10-CM

## 2018-05-07 LAB
ALBUMIN SERPL-MCNC: 3.6 G/DL (ref 3.4–4.8)
ALBUMIN/GLOB SERPL: 1.1 G/DL (ref 1.1–1.8)
ALP SERPL-CCNC: 91 U/L (ref 38–126)
ALT SERPL W P-5'-P-CCNC: 41 U/L (ref 21–72)
ANION GAP SERPL CALCULATED.3IONS-SCNC: 10 MMOL/L (ref 5–15)
APTT PPP: 30.1 SECONDS (ref 20–40.3)
AST SERPL-CCNC: 31 U/L (ref 17–59)
BASOPHILS # BLD AUTO: 0.02 10*3/MM3 (ref 0–0.2)
BASOPHILS NFR BLD AUTO: 0.3 % (ref 0–2)
BILIRUB SERPL-MCNC: 0.2 MG/DL (ref 0.2–1.3)
BILIRUB UR QL STRIP: NEGATIVE
BUN BLD-MCNC: 12 MG/DL (ref 7–21)
BUN/CREAT SERPL: 17.9 (ref 7–25)
CALCIUM SPEC-SCNC: 8.5 MG/DL (ref 8.4–10.2)
CHLORIDE SERPL-SCNC: 95 MMOL/L (ref 95–110)
CK MB SERPL-CCNC: 2.58 NG/ML (ref 0–5)
CLARITY UR: CLEAR
CO2 SERPL-SCNC: 29 MMOL/L (ref 22–31)
COLOR UR: YELLOW
CREAT BLD-MCNC: 0.67 MG/DL (ref 0.7–1.3)
D-DIMER, QUANTITATIVE (MAD,POW, STR): 333 NG/ML (FEU) (ref 0–470)
DEPRECATED RDW RBC AUTO: 40.8 FL (ref 35.1–43.9)
EOSINOPHIL # BLD AUTO: 0.37 10*3/MM3 (ref 0–0.7)
EOSINOPHIL NFR BLD AUTO: 5.2 % (ref 0–7)
ERYTHROCYTE [DISTWIDTH] IN BLOOD BY AUTOMATED COUNT: 14 % (ref 11.5–14.5)
GFR SERPL CREATININE-BSD FRML MDRD: 132 ML/MIN/1.73 (ref 70–162)
GLOBULIN UR ELPH-MCNC: 3.4 GM/DL (ref 2.3–3.5)
GLUCOSE BLD-MCNC: 383 MG/DL (ref 60–100)
GLUCOSE UR STRIP-MCNC: ABNORMAL MG/DL
HCT VFR BLD AUTO: 38.8 % (ref 39–49)
HGB BLD-MCNC: 13.2 G/DL (ref 13.7–17.3)
HGB UR QL STRIP.AUTO: NEGATIVE
HOLD SPECIMEN: NORMAL
IMM GRANULOCYTES # BLD: 0.14 10*3/MM3 (ref 0–0.02)
IMM GRANULOCYTES NFR BLD: 1.9 % (ref 0–0.5)
INR PPP: 0.95 (ref 0.8–1.2)
KETONES UR QL STRIP: NEGATIVE
LEUKOCYTE ESTERASE UR QL STRIP.AUTO: NEGATIVE
LYMPHOCYTES # BLD AUTO: 1.65 10*3/MM3 (ref 0.6–4.2)
LYMPHOCYTES NFR BLD AUTO: 23 % (ref 10–50)
MAGNESIUM SERPL-MCNC: 1.9 MG/DL (ref 1.6–2.3)
MCH RBC QN AUTO: 27.3 PG (ref 26.5–34)
MCHC RBC AUTO-ENTMCNC: 34 G/DL (ref 31.5–36.3)
MCV RBC AUTO: 80.3 FL (ref 80–98)
MONOCYTES # BLD AUTO: 0.6 10*3/MM3 (ref 0–0.9)
MONOCYTES NFR BLD AUTO: 8.4 % (ref 0–12)
NEUTROPHILS # BLD AUTO: 4.4 10*3/MM3 (ref 2–8.6)
NEUTROPHILS NFR BLD AUTO: 61.2 % (ref 37–80)
NITRITE UR QL STRIP: NEGATIVE
NT-PROBNP SERPL-MCNC: 44.6 PG/ML (ref 0–450)
PH UR STRIP.AUTO: 6 [PH] (ref 5–9)
PLATELET # BLD AUTO: 171 10*3/MM3 (ref 150–450)
PMV BLD AUTO: 9.1 FL (ref 8–12)
POTASSIUM BLD-SCNC: 4.3 MMOL/L (ref 3.5–5.1)
PROT SERPL-MCNC: 7 G/DL (ref 6.3–8.6)
PROT UR QL STRIP: NEGATIVE
PROTHROMBIN TIME: 12.5 SECONDS (ref 11.1–15.3)
RBC # BLD AUTO: 4.83 10*6/MM3 (ref 4.37–5.74)
SODIUM BLD-SCNC: 134 MMOL/L (ref 137–145)
SP GR UR STRIP: 1.03 (ref 1–1.03)
TROPONIN I SERPL-MCNC: <0.012 NG/ML
UROBILINOGEN UR QL STRIP: ABNORMAL
WBC NRBC COR # BLD: 7.18 10*3/MM3 (ref 3.2–9.8)

## 2018-05-07 PROCEDURE — 94760 N-INVAS EAR/PLS OXIMETRY 1: CPT

## 2018-05-07 PROCEDURE — 85730 THROMBOPLASTIN TIME PARTIAL: CPT | Performed by: PHYSICIAN ASSISTANT

## 2018-05-07 PROCEDURE — 83735 ASSAY OF MAGNESIUM: CPT | Performed by: PHYSICIAN ASSISTANT

## 2018-05-07 PROCEDURE — 71045 X-RAY EXAM CHEST 1 VIEW: CPT

## 2018-05-07 PROCEDURE — 80053 COMPREHEN METABOLIC PANEL: CPT | Performed by: PHYSICIAN ASSISTANT

## 2018-05-07 PROCEDURE — 85025 COMPLETE CBC W/AUTO DIFF WBC: CPT | Performed by: PHYSICIAN ASSISTANT

## 2018-05-07 PROCEDURE — 85610 PROTHROMBIN TIME: CPT | Performed by: PHYSICIAN ASSISTANT

## 2018-05-07 PROCEDURE — 70450 CT HEAD/BRAIN W/O DYE: CPT

## 2018-05-07 PROCEDURE — 85379 FIBRIN DEGRADATION QUANT: CPT | Performed by: PHYSICIAN ASSISTANT

## 2018-05-07 PROCEDURE — 99284 EMERGENCY DEPT VISIT MOD MDM: CPT

## 2018-05-07 PROCEDURE — 96374 THER/PROPH/DIAG INJ IV PUSH: CPT

## 2018-05-07 PROCEDURE — 96361 HYDRATE IV INFUSION ADD-ON: CPT

## 2018-05-07 PROCEDURE — 25010000002 METHYLPREDNISOLONE PER 125 MG: Performed by: PHYSICIAN ASSISTANT

## 2018-05-07 PROCEDURE — 84484 ASSAY OF TROPONIN QUANT: CPT | Performed by: PHYSICIAN ASSISTANT

## 2018-05-07 PROCEDURE — 94640 AIRWAY INHALATION TREATMENT: CPT

## 2018-05-07 PROCEDURE — 94799 UNLISTED PULMONARY SVC/PX: CPT

## 2018-05-07 PROCEDURE — 81003 URINALYSIS AUTO W/O SCOPE: CPT | Performed by: PHYSICIAN ASSISTANT

## 2018-05-07 PROCEDURE — 82553 CREATINE MB FRACTION: CPT | Performed by: PHYSICIAN ASSISTANT

## 2018-05-07 PROCEDURE — 83880 ASSAY OF NATRIURETIC PEPTIDE: CPT | Performed by: PHYSICIAN ASSISTANT

## 2018-05-07 RX ORDER — GUAIFENESIN 600 MG/1
1200 TABLET, EXTENDED RELEASE ORAL 2 TIMES DAILY
Qty: 40 TABLET | Refills: 0 | Status: SHIPPED | OUTPATIENT
Start: 2018-05-07 | End: 2018-05-17 | Stop reason: ALTCHOICE

## 2018-05-07 RX ORDER — IPRATROPIUM BROMIDE AND ALBUTEROL SULFATE 2.5; .5 MG/3ML; MG/3ML
3 SOLUTION RESPIRATORY (INHALATION)
Status: DISCONTINUED | OUTPATIENT
Start: 2018-05-07 | End: 2018-05-07 | Stop reason: HOSPADM

## 2018-05-07 RX ORDER — PREDNISONE 20 MG/1
20 TABLET ORAL
Qty: 15 TABLET | Refills: 0 | Status: SHIPPED | OUTPATIENT
Start: 2018-05-07 | End: 2018-05-12

## 2018-05-07 RX ORDER — METHYLPREDNISOLONE SODIUM SUCCINATE 125 MG/2ML
125 INJECTION, POWDER, LYOPHILIZED, FOR SOLUTION INTRAMUSCULAR; INTRAVENOUS ONCE
Status: COMPLETED | OUTPATIENT
Start: 2018-05-07 | End: 2018-05-07

## 2018-05-07 RX ORDER — AZITHROMYCIN 250 MG/1
TABLET, FILM COATED ORAL
Qty: 6 TABLET | Refills: 0 | Status: SHIPPED | OUTPATIENT
Start: 2018-05-07 | End: 2018-05-17 | Stop reason: ALTCHOICE

## 2018-05-07 RX ORDER — IPRATROPIUM BROMIDE AND ALBUTEROL SULFATE 2.5; .5 MG/3ML; MG/3ML
3 SOLUTION RESPIRATORY (INHALATION) ONCE
Status: COMPLETED | OUTPATIENT
Start: 2018-05-07 | End: 2018-05-07

## 2018-05-07 RX ORDER — SODIUM CHLORIDE 0.9 % (FLUSH) 0.9 %
10 SYRINGE (ML) INJECTION AS NEEDED
Status: DISCONTINUED | OUTPATIENT
Start: 2018-05-07 | End: 2018-05-07 | Stop reason: HOSPADM

## 2018-05-07 RX ADMIN — SODIUM CHLORIDE 1000 ML: 900 INJECTION, SOLUTION INTRAVENOUS at 09:42

## 2018-05-07 RX ADMIN — IPRATROPIUM BROMIDE AND ALBUTEROL SULFATE 3 ML: 2.5; .5 SOLUTION RESPIRATORY (INHALATION) at 12:11

## 2018-05-07 RX ADMIN — Medication 10 ML: at 09:42

## 2018-05-07 RX ADMIN — METHYLPREDNISOLONE SODIUM SUCCINATE 125 MG: 125 INJECTION, POWDER, FOR SOLUTION INTRAMUSCULAR; INTRAVENOUS at 09:43

## 2018-05-07 RX ADMIN — IPRATROPIUM BROMIDE AND ALBUTEROL SULFATE 3 ML: .5; 3 SOLUTION RESPIRATORY (INHALATION) at 09:38

## 2018-05-07 NOTE — ED PROVIDER NOTES
Subjective     History provided by:  Patient   used: No    Shortness of Breath   Severity:  Mild  Onset quality:  Gradual  Duration:  1 week  Timing:  Constant  Progression:  Unchanged  Chronicity:  Recurrent  Context: URI    Context: not activity, not animal exposure, not emotional upset, not fumes, not known allergens, not occupational exposure, not pollens, not smoke exposure, not strong odors and not weather changes    Relieved by:  Rest  Worsened by:  Exertion and movement  Ineffective treatments:  Inhaler  Associated symptoms: cough, sputum production and wheezing    Associated symptoms: no abdominal pain, no chest pain, no claudication, no diaphoresis, no ear pain, no fever, no headaches, no hemoptysis, no neck pain, no PND, no rash, no sore throat, no syncope, no swollen glands and no vomiting    Risk factors: obesity    Risk factors: no recent alcohol use, no family hx of DVT, no hx of cancer, no hx of PE/DVT, no oral contraceptive use, no prolonged immobilization, no recent surgery and no tobacco use        Review of Systems   Constitutional: Negative.  Negative for diaphoresis and fever.   HENT: Positive for congestion, postnasal drip, rhinorrhea and sinus pain. Negative for ear pain and sore throat.    Respiratory: Positive for cough, sputum production, shortness of breath and wheezing. Negative for hemoptysis.    Cardiovascular: Negative for chest pain, claudication, syncope and PND.   Gastrointestinal: Negative for abdominal pain and vomiting.   Musculoskeletal: Negative for neck pain.   Skin: Negative for rash.   Neurological: Negative for headaches.   All other systems reviewed and are negative.      Past Medical History:   Diagnosis Date   • Chest pain    • Chronic back pain    • Coronary artery disease    • Diabetes mellitus     10.6% was last A1C per patient   • Factor II deficiency    • Fatty liver    • GERD (gastroesophageal reflux disease)    • Hyperlipidemia    •  Hypertension    • Morbid obesity    • Pulmonary embolism    • RLS (restless legs syndrome)    • Seizures     last one many years ago   • Sleep apnea     c-pap       Allergies   Allergen Reactions   • Glipizide Other (See Comments)     Slurred Speech   • Reglan [Metoclopramide] Anxiety   • Tramadol Other (See Comments)     seizures   • Risperdal [Risperidone] Other (See Comments)     Slurred speech       Past Surgical History:   Procedure Laterality Date   • ADENOIDECTOMY     • CARDIAC CATHETERIZATION N/A 3/15/2017    Procedure: Left Heart Cath;  Surgeon: Edwige Briceno MD;  Location: [facility] CATH INVASIVE LOCATION;  Service:    • CARDIAC CATHETERIZATION N/A 3/15/2017    Procedure: Stent SOPHIA coronary;  Surgeon: Edwige Briceno MD;  Location: [facility] CATH INVASIVE LOCATION;  Service:    • CARDIAC CATHETERIZATION N/A 3/1/2018    Procedure: Left Heart Cath;  Surgeon: Conor Parsons MD;  Location: [facility] CATH INVASIVE LOCATION;  Service:    • CHOLECYSTECTOMY      lap   • CORONARY ANGIOPLASTY WITH STENT PLACEMENT     • HI RT/LT HEART CATHETERS N/A 3/15/2017    Procedure: Percutaneous Coronary Intervention;  Surgeon: Edwige Briceno MD;  Location: [facility] CATH INVASIVE LOCATION;  Service: Cardiovascular   • TONSILLECTOMY     • TRANSESOPHAGEAL ECHOCARDIOGRAM (MONICA)         Family History   Problem Relation Age of Onset   • Heart disease Mother    • Hypertension Mother    • Hyperlipidemia Mother    • Coronary artery disease Mother    • Diabetes Mother    • Obesity Mother    • Kidney failure Mother    • Sleep apnea Father    • Cancer Paternal Grandfather        Social History     Social History   • Marital status:      Spouse name: Cori   • Number of children: 0     Occupational History   • Disabled      Social History Main Topics   • Smoking status: Former Smoker     Packs/day: 0.25     Years: 0.50     Types: Cigarettes     Quit date: 1997   • Smokeless tobacco: Current User     Types: Snuff      Comment: quit smoking 25  years ago; he does use snuff   • Alcohol use No   • Drug use: No   • Sexual activity: Yes     Partners: Female     Birth control/ protection: None     Other Topics Concern   • Not on file           Objective   Physical Exam   Constitutional: He is oriented to person, place, and time. He appears well-developed and well-nourished. No distress.   HENT:   Head: Normocephalic and atraumatic.   Nose: Mucosal edema and rhinorrhea present. Right sinus exhibits maxillary sinus tenderness. Left sinus exhibits maxillary sinus tenderness.   Mouth/Throat: Uvula is midline and mucous membranes are normal. Posterior oropharyngeal erythema present. No oropharyngeal exudate. No tonsillar exudate.   Eyes: Conjunctivae and EOM are normal. Pupils are equal, round, and reactive to light. Right eye exhibits no discharge. Left eye exhibits no discharge. No scleral icterus.   Neck: Normal range of motion. Neck supple. No JVD present. No tracheal deviation present. No thyromegaly present.   Cardiovascular: Normal rate, regular rhythm, normal heart sounds and intact distal pulses.  Exam reveals no gallop and no friction rub.    No murmur heard.  Pulmonary/Chest: Effort normal. No stridor. No respiratory distress. He has wheezes. He has no rales. He exhibits no tenderness.   Abdominal: Soft. Bowel sounds are normal. He exhibits no distension and no mass. There is no tenderness. There is no rebound and no guarding. No hernia.   Musculoskeletal: Normal range of motion. He exhibits no edema, tenderness or deformity.   Lymphadenopathy:     He has no cervical adenopathy.   Neurological: He is alert and oriented to person, place, and time. He has normal reflexes. He displays normal reflexes. No cranial nerve deficit. He exhibits normal muscle tone. Coordination normal.   Skin: Skin is warm and dry. Capillary refill takes less than 2 seconds. No rash noted. He is not diaphoretic. No erythema. No pallor.   Psychiatric: He has a normal mood and  affect. His behavior is normal. Judgment and thought content normal.   Nursing note and vitals reviewed.      Procedures           ED Course  ED Course      Labs Reviewed   COMPREHENSIVE METABOLIC PANEL - Abnormal; Notable for the following:        Result Value    Glucose 383 (*)     Creatinine 0.67 (*)     Sodium 134 (*)     All other components within normal limits   URINALYSIS W/ MICROSCOPIC IF INDICATED - Abnormal; Notable for the following:     Glucose, UA >=1000 mg/dL (3+) (*)     All other components within normal limits    Narrative:     Urine microscopic not indicated.   CBC WITH AUTO DIFFERENTIAL - Abnormal; Notable for the following:     Hemoglobin 13.2 (*)     Hematocrit 38.8 (*)     Immature Grans % 1.9 (*)     Immature Grans, Absolute 0.14 (*)     All other components within normal limits   PROTIME-INR - Normal    Narrative:     Therapeutic range for most indications is 2.0-3.0 INR,  or 2.5-3.5 for mechanical heart valves.   APTT - Normal    Narrative:     The recommended Heparin therapeutic range is 68-97 seconds.   D-DIMER, QUANTITATIVE - Normal    Narrative:     Dimer values <500 ng/ml FEU are FDA approved as aid in diagnosis of deep venous thrombosis and pulmonary embolism.  This test should not be used in an exclusion strategy with pretest probability alone.    A recent guideline regarding diagnosis for pulmonary thomboembolism recommends an adjusted exclusion criterion of age x 10 ng/ml FEU for patients >50 years of age ([employer]ori Intern Med 2015; 163: 701-711).   TROPONIN (IN-HOUSE) - Normal   BNP (IN-HOUSE) - Normal   MAGNESIUM - Normal   CK MB - Normal   CBC AND DIFFERENTIAL    Narrative:     The following orders were created for panel order CBC & Differential.  Procedure                               Abnormality         Status                     ---------                               -----------         ------                     CBC Auto Differential[[id number]]        Abnormal            Final  result                 Please view results for these tests on the individual orders.   EXTRA TUBES    Narrative:     The following orders were created for panel order Extra Tubes.  Procedure                               Abnormality         Status                     ---------                               -----------         ------                     Gold Top - SST[[id number]]                                   Final result                 Please view results for these tests on the individual orders.   GOLD TOP - SST     Xr Chest 2 View    Result Date: 4/25/2018  Narrative: Chest 2 view on  4/25/2018 CLINICAL INDICATION: Chest pain, shortness of breath COMPARISON: 3/29/2018 FINDINGS: The lungs are clear. Cardiac, hilar and mediastinal contours are within normal limits. Pulmonary vascularity is within normal limits. No bony abnormality is noted.     Impression: No active disease. Electronically signed by:  Pelon Anderson  4/25/2018 1:15 AM CDT Workstation: [id number]    Ct Head Without Contrast    Result Date: 5/7/2018  Narrative: EXAM DESCRIPTION: CT HEAD WO CONTRAST CLINICAL HISTORY:  Dizziness.   COMPARISON: 2/14/2018 DOSE LENGTH PRODUCT: 955.3 CONTRAST: None TECHNIQUE:  Axial images from skull base to vertex.  This exam was performed according to our departmental dose optimization program, which includes automated exposure control, adjustment of the mA and/or KV according to patient size and/or use of iterative reconstruction technique. FINDINGS: No Chiari malformation. Extra-axial spaces: Normal.  Intracranial hemorrhage: No intracranial hemorrhage or suspicious extra-axial fluid collections. Ventricular system: No hydrocephalus. Basal cisterns: Unremarkable. Cerebral parenchyma: No edema, midline shift, or mass effect. Gray-white differentiation is maintained.  Midline shift: None.  Cerebellum: Unremarkable allowing for the above findings of artifact within the posterior fossa.. Brainstem : Degraded by  artifact, otherwise unremarkable CT appearance. Calvarium: No acute or suspicious calvarial lesion.  Vascular system: Unremarkable noncontrast appearance.  Paranasal sinuses and mastoid air cells: There is mild mucosal thickening within the maxillary right sphenoid and within the ethmoid air cells without air-fluid levels. The mastoids are clear.  Visualized orbits: Unremarkable.  Visualized upper cervical spine: Not included. Sella: Unremarkable.  Skull base: Unremarkable. ADDITIONAL FINDINGS: None     Impression: No CT evidence of intracranial hemorrhage, mass effect, or acute large vessel distribution infarct. Mild mucosal paranasal sinus disease without air-fluid levels. Electronically signed by:  Artur Oconnor MD  5/7/2018 11:51 AM CDT Workstation: [id number]    Xr Chest 1 View    Result Date: 5/7/2018  Narrative: PROCEDURE: Chest, AP upright at 9:26 AM. INDICATION: Cough. COMPARISON: 4/25/2018 chest x-ray and CT chest . Heart size normal with normal pulmonary vascularity. Lungs clear. No pleural effusions. Old fractures posterior lateral mid left ribs.     Impression: No acute disease. [id number] Electronically signed by:  Julito Carbone MD  5/7/2018 10:07 AM CDT Workstation: [id number]    Ct Angiogram Chest With Contrast    Result Date: 4/25/2018  Narrative: CT angiogram chest with contrast on 4/25/2018 CLINICAL INDICATION: Shortness of breath, chest pain TECHNIQUE: Multiple axial images are obtained throughout the chest following the administration of IV contrast.  Computer generated 3D reconstructions/MIPS were performed. This exam was performed according to our departmental dose-optimization program, which includes automated exposure control, adjustment of the mA and/or kV according to patient size and/or use of iterative reconstruction technique. Total DLP is 1405.9 mGy*cm. COMPARISON: 2/14/2018 FINDINGS: Examination is somewhat limited by streak artifact related to the patient's body habitus. Examination  is also limited due to suboptimal bolus timing. No evidence of a large or central filling defect to suggest pulmonary embolus is noted. Cannot fully exclude small peripheral pulmonary emboli on this exam. There is no pleural or pericardial effusion. The patient is status post cholecystectomy. Splenomegaly is noted with the spleen measuring 19 cm in greatest [id number] length. Limited visualized upper abdomen is otherwise unremarkable. There is no thoracic adenopathy. The lungs are clear. Old left rib fractures are noted. No acute bony abnormality is noted.     Impression: 1. Examination is suboptimal for evaluation of pulmonary embolus with no evidence of a large or central pulmonary embolus but cannot fully exclude small peripheral pulmonary emboli on this exam as above. 2. Mild splenomegaly. Electronically signed by:  Pelon Anderson  4/25/2018 5:28 AM CDT Workstation: [id number]      Patient has maintained O2 without complication on room air. Labs and chest x-ray are unremarkable. Will treat patient with oral steroids and antibiotics. Advised to continue neb treatments at home. Instructed to return to ED if symptoms worsen or return.             MDM      Final diagnoses:   Acute bronchitis, unspecified organism   Cough   Acute non-recurrent maxillary sinusitis            HENNA Candelario  05/07/18 [id number]

## 2018-05-08 ENCOUNTER — EPISODE CHANGES (OUTPATIENT)
Dept: CASE MANAGEMENT | Facility: OTHER | Age: 38
End: 2018-05-08

## 2018-05-11 ENCOUNTER — RESULTS ENCOUNTER (OUTPATIENT)
Dept: BARIATRICS/WEIGHT MGMT | Facility: CLINIC | Age: 38
End: 2018-05-11

## 2018-05-11 DIAGNOSIS — E66.01 MORBID OBESITY WITH BMI OF 70 AND OVER, ADULT (HCC): ICD-10-CM

## 2018-05-11 DIAGNOSIS — E78.2 MIXED HYPERLIPIDEMIA: ICD-10-CM

## 2018-05-11 DIAGNOSIS — E11.69 DIABETES MELLITUS TYPE 2 IN OBESE (HCC): ICD-10-CM

## 2018-05-11 DIAGNOSIS — I10 UNCONTROLLED HYPERTENSION: ICD-10-CM

## 2018-05-11 DIAGNOSIS — K21.9 GASTROESOPHAGEAL REFLUX DISEASE, ESOPHAGITIS PRESENCE NOT SPECIFIED: ICD-10-CM

## 2018-05-11 DIAGNOSIS — E66.9 DIABETES MELLITUS TYPE 2 IN OBESE (HCC): ICD-10-CM

## 2018-05-17 ENCOUNTER — OFFICE VISIT (OUTPATIENT)
Dept: BARIATRICS/WEIGHT MGMT | Facility: CLINIC | Age: 38
End: 2018-05-17

## 2018-05-17 VITALS
HEIGHT: 68 IN | DIASTOLIC BLOOD PRESSURE: 67 MMHG | WEIGHT: 315 LBS | HEART RATE: 68 BPM | BODY MASS INDEX: 47.74 KG/M2 | OXYGEN SATURATION: 99 % | SYSTOLIC BLOOD PRESSURE: 119 MMHG | TEMPERATURE: 98.2 F

## 2018-05-17 DIAGNOSIS — E11.8 TYPE 2 DIABETES MELLITUS WITH COMPLICATION, UNSPECIFIED LONG TERM INSULIN USE STATUS: Chronic | ICD-10-CM

## 2018-05-17 DIAGNOSIS — K21.9 GASTROESOPHAGEAL REFLUX DISEASE, ESOPHAGITIS PRESENCE NOT SPECIFIED: ICD-10-CM

## 2018-05-17 DIAGNOSIS — E78.2 MIXED HYPERLIPIDEMIA: ICD-10-CM

## 2018-05-17 DIAGNOSIS — I10 ESSENTIAL HYPERTENSION: Chronic | ICD-10-CM

## 2018-05-17 PROCEDURE — 99214 OFFICE O/P EST MOD 30 MIN: CPT | Performed by: SURGERY

## 2018-05-17 NOTE — PROGRESS NOTES
Patient Care Team:  LALA Barajas as PCP - General (Nurse Practitioner)  LALA Barajas as PCP - Claims Attributed  Gonsalo Hogue MD as Consulting Physician (Hospitalist)    Reason for Visit:  Surgical Weight loss    Subjective     Patient is a 37 y.o. male presents with morbid obesity and his Body mass index is 73.38 kg/m².     He is here for discussion of surgical weight loss options.  He stated he has been with the disease of obesity for year(s).  He stated he suffers from hypertension, coronary artery disease, structure sleep apnea, chronic back pain, reflux and a liver disease due to his weight gain.  He stated that weight loss helps alleviate these symptoms.   He stated that he has tried multiple diet regimens including currently participating in a medically supervised weight loss program to help with weight loss.  He stated that he has attempted these conservative methods for weight loss without maintaining long term success.  Today he would like to discuss surgical weight loss options such as the Laparoscopic Sleeve Gastrectomy or the Laparoscopic R - Y Gastric Bypass.     Review of Systems  General ROS: negative  Gastrointestinal ROS: no abdominal pain, change in bowel habits, or black or bloody stools    History  Past Medical History:   Diagnosis Date   • Chest pain    • Chronic back pain    • Coronary artery disease    • Diabetes mellitus     10.6% was last A1C per patient   • Factor II deficiency    • Fatty liver    • GERD (gastroesophageal reflux disease)    • Hyperlipidemia    • Hypertension    • Morbid obesity    • Pulmonary embolism    • RLS (restless legs syndrome)    • Seizures     last one many years ago   • Sleep apnea     c-pap     Past Surgical History:   Procedure Laterality Date   • ADENOIDECTOMY     • CARDIAC CATHETERIZATION N/A 3/15/2017    Procedure: Left Heart Cath;  Surgeon: Edwige Briceno MD;  Location: [facility] INVASIVE LOCATION;  Service:    • CARDIAC CATHETERIZATION  N/A 3/15/2017    Procedure: Stent SOPHIA coronary;  Surgeon: Edwige Briceno MD;  Location: [facility] CATH INVASIVE LOCATION;  Service:    • CARDIAC CATHETERIZATION N/A 3/1/2018    Procedure: Left Heart Cath;  Surgeon: Conor Parsons MD;  Location: [facility] CATH INVASIVE LOCATION;  Service:    • CHOLECYSTECTOMY      lap   • CORONARY ANGIOPLASTY WITH STENT PLACEMENT     • NM RT/LT HEART CATHETERS N/A 3/15/2017    Procedure: Percutaneous Coronary Intervention;  Surgeon: Edwige Briceno MD;  Location: [facility] CATH INVASIVE LOCATION;  Service: Cardiovascular   • TONSILLECTOMY     • TRANSESOPHAGEAL ECHOCARDIOGRAM (MONICA)       Family History   Problem Relation Age of Onset   • Heart disease Mother    • Hypertension Mother    • Hyperlipidemia Mother    • Coronary artery disease Mother    • Diabetes Mother    • Obesity Mother    • Kidney failure Mother    • Sleep apnea Father    • Cancer Paternal Grandfather      Social History   Substance Use Topics   • Smoking status: Former Smoker     Packs/day: 0.25     Years: 0.50     Types: Cigarettes     Quit date: 1997   • Smokeless tobacco: Current User     Types: Snuff      Comment: quit smoking 25 years ago; he does use snuff   • Alcohol use No       (Not in a hospital admission)  Allergies:  Glipizide; Reglan [metoclopramide]; Tramadol; and Risperdal [risperidone]      Current Outpatient Prescriptions:   •  albuterol (ACCUNEB) 1.25 MG/3ML nebulizer solution, Take 3 mL by nebulization Every 6 (Six) Hours As Needed for Wheezing., Disp: 100 vial, Rfl: 0  •  albuterol (PROVENTIL HFA;VENTOLIN HFA) 108 (90 BASE) MCG/ACT inhaler, Inhale 2 puffs Every 4 (Four) Hours As Needed for Wheezing., Disp: , Rfl:   •  amLODIPine (NORVASC) 10 MG tablet, Take 1 tablet by mouth Every Night., Disp: 30 tablet, Rfl: 5  •  clopidogrel (PLAVIX) 75 MG tablet, Take 1 tablet by mouth Daily., Disp: 30 tablet, Rfl: 3  •  glucose monitor monitoring kit, 1 each As Needed (check blood glucose 3x daily)., Disp: 1 each,  "Rfl: 0  •  insulin aspart (novoLOG) 100 UNIT/ML injection, Inject 20 Units under the skin 3 (Three) Times a Day Before Meals., Disp: 10 mL, Rfl: 3  •  insulin detemir (LEVEMIR) 100 UNIT/ML injection, Inject 40 Units under the skin Every Night., Disp: 10 mL, Rfl: 3  •  isosorbide mononitrate (IMDUR) 120 MG 24 hr tablet, TAKE ONE TABLET BY MOUTH ONCE DAILY, Disp: 30 tablet, Rfl: 0  •  lisinopril (PRINIVIL,ZESTRIL) 40 MG tablet, Take 1 tablet by mouth Every Morning., Disp: 30 tablet, Rfl: 5  •  metFORMIN (GLUCOPHAGE) 1000 MG tablet, Take 1 tablet by mouth 2 (Two) Times a Day With Meals., Disp: 60 tablet, Rfl: 2  •  metoprolol succinate XL (TOPROL XL) 200 MG 24 hr tablet, Take 1 tablet by mouth Daily., Disp: 31 tablet, Rfl: 6  •  MICROLET LANCETS misc, One touch delica lancets, Disp: 100 each, Rfl: 5  •  nitroglycerin (NITROSTAT) 0.4 MG SL tablet, Place 1 tablet under the tongue Every 5 (Five) Minutes As Needed for Chest Pain. Take no more than 3 doses in 15 minutes., Disp: 100 tablet, Rfl: 12  •  pantoprazole (PROTONIX) 40 MG EC tablet, Take 1 tablet by mouth Every Night., Disp: 30 tablet, Rfl: 5  •  pramipexole (MIRAPEX) 1 MG tablet, Take 2 tablets by mouth Every Night. Taking 2mg daily, Disp: 60 tablet, Rfl: 5  •  pravastatin (PRAVACHOL) 80 MG tablet, Take 1 tablet by mouth Every Night., Disp: 30 tablet, Rfl: 5  •  ranolazine (RANEXA) 1000 MG 12 hr tablet, Take 1 tablet by mouth Every 12 (Twelve) Hours., Disp: 60 tablet, Rfl: 5  •  RELION INSULIN SYRINGE 31G X 15/64\" 0.5 ML misc, , Disp: , Rfl:   •  rivaroxaban (XARELTO) 20 MG tablet, Take 1 tablet by mouth Daily., Disp: 30 tablet, Rfl: 4  •  sertraline (ZOLOFT) 25 MG tablet, Take 1 tablet by mouth Daily., Disp: 30 tablet, Rfl: 5  •  traZODone (DESYREL) 300 MG tablet, Take 1 tablet by mouth Every Night., Disp: 30 tablet, Rfl: 5  •  cyclobenzaprine (FLEXERIL) 10 MG tablet, Take 10 mg by mouth 3 (Three) Times a Day As Needed for Muscle Spasms., Disp: , Rfl:   •  " HYDROcodone-acetaminophen (NORCO)  MG per tablet, Take 1 tablet by mouth Every 6 (Six) Hours As Needed for Moderate Pain ., Disp: 15 tablet, Rfl: 0    Objective     Vital Signs  Temp:  [98.2 °F (36.8 °C)] 98.2 °F (36.8 °C)  Heart Rate:  [68] 68  BP: (119)/(67) 119/67  Body mass index is 73.38 kg/m².  1    05/17/18  1047   Weight: (!) 219 kg (482 lb 9.6 oz)       Physical Exam:     HEENT: extra ocular movement intact  Respiratory: appears well, vitals normal, no respiratory distress, acyanotic, normal RR  Neurologic: alert, oriented, normal speech, no focal findings or movement disorder noted       Results Review:   None        Assessment/Plan   Encounter Diagnoses   Name Primary?   • Body mass index (BMI) of 70 or greater in adult Yes   • Type 2 diabetes mellitus with complication, unspecified long term insulin use status    • Gastroesophageal reflux disease, esophagitis presence not specified    • Mixed hyperlipidemia    • Essential hypertension        1.  A total of 25 minutes was spent face-to-face with the patient during this encounter and over half the time was spent on counseling and coordination of care the disease of morbid obesity.  Patient suffers from morbid obesity having a body mass index that exceeds 70.  We discussed in depth the importance of avoiding certain types of foods such as those containing high concentrations sugars.  I also recommended that he read the labels of the products that he is purchasing as well as avoiding consistently eating out and beginning to prepare his meals at home.  We discussed his current behavior such as drinking sodas and sweet tea and I recommended that he avoid these products because of the high sugar content.  He stated he will attempt these 3 basic things and he will be reassessed next month based off of his behavioral changes.  I will also explained that due to his significantly large BMI he is currently not a surgical candidate for our program at this  time.    I discussed the patients findings and my recommendations with patient and family.     Dr. Melchor Woodward MD FACS    05/17/18  11:06 AM  Patient Care Team:  LALA Barajas as PCP - General (Nurse Practitioner)  LALA Barajas as PCP - Claims Attributed  Gonsalo Hogue MD as Consulting Physician (Hospitalist)

## 2018-05-24 ENCOUNTER — HOSPITAL ENCOUNTER (OUTPATIENT)
Dept: GENERAL RADIOLOGY | Facility: HOSPITAL | Age: 38
Discharge: HOME | End: 2018-05-24
Admitting: NURSE PRACTITIONER

## 2018-05-24 ENCOUNTER — OFFICE VISIT (OUTPATIENT)
Dept: FAMILY MEDICINE CLINIC | Facility: CLINIC | Age: 38
End: 2018-05-24

## 2018-05-24 ENCOUNTER — OFFICE VISIT (OUTPATIENT)
Dept: WOUND CARE | Facility: HOSPITAL | Age: 38
End: 2018-05-24

## 2018-05-24 ENCOUNTER — LAB REQUISITION (OUTPATIENT)
Dept: LAB | Facility: HOSPITAL | Age: 38
End: 2018-05-24

## 2018-05-24 VITALS
WEIGHT: 315 LBS | TEMPERATURE: 96 F | SYSTOLIC BLOOD PRESSURE: 122 MMHG | HEART RATE: 80 BPM | RESPIRATION RATE: 22 BRPM | DIASTOLIC BLOOD PRESSURE: 76 MMHG | OXYGEN SATURATION: 98 % | BODY MASS INDEX: 47.74 KG/M2 | HEIGHT: 68 IN

## 2018-05-24 DIAGNOSIS — S91.132A: ICD-10-CM

## 2018-05-24 DIAGNOSIS — E11.8 TYPE 2 DIABETES MELLITUS WITH COMPLICATION, UNSPECIFIED LONG TERM INSULIN USE STATUS: Chronic | ICD-10-CM

## 2018-05-24 DIAGNOSIS — S91.132A PUNCTURE WOUND OF LEFT GREAT TOE WITHOUT FOREIGN BODY WITHOUT DAMAGE TO NAIL, INITIAL ENCOUNTER: Primary | ICD-10-CM

## 2018-05-24 DIAGNOSIS — S91.132A PUNCTURE WOUND OF GREAT TOE OF LEFT FOOT, INITIAL ENCOUNTER: ICD-10-CM

## 2018-05-24 LAB
ALBUMIN SERPL-MCNC: 3.9 G/DL (ref 3.4–4.8)
ALBUMIN/GLOB SERPL: 1.1 G/DL (ref 1.1–1.8)
ALP SERPL-CCNC: 73 U/L (ref 38–126)
ALT SERPL W P-5'-P-CCNC: 49 U/L (ref 21–72)
ANION GAP SERPL CALCULATED.3IONS-SCNC: 11 MMOL/L (ref 5–15)
AST SERPL-CCNC: 45 U/L (ref 17–59)
BASOPHILS # BLD AUTO: 0.02 10*3/MM3 (ref 0–0.2)
BASOPHILS NFR BLD AUTO: 0.3 % (ref 0–2)
BILIRUB SERPL-MCNC: 0.6 MG/DL (ref 0.2–1.3)
BUN BLD-MCNC: 10 MG/DL (ref 7–21)
BUN/CREAT SERPL: 16.4 (ref 7–25)
CALCIUM SPEC-SCNC: 9 MG/DL (ref 8.4–10.2)
CHLORIDE SERPL-SCNC: 98 MMOL/L (ref 95–110)
CO2 SERPL-SCNC: 27 MMOL/L (ref 22–31)
CREAT BLD-MCNC: 0.61 MG/DL (ref 0.7–1.3)
CRP SERPL-MCNC: 1.4 MG/DL (ref 0–1)
DEPRECATED RDW RBC AUTO: 41.6 FL (ref 35.1–43.9)
EOSINOPHIL # BLD AUTO: 0.41 10*3/MM3 (ref 0–0.7)
EOSINOPHIL NFR BLD AUTO: 6.5 % (ref 0–7)
ERYTHROCYTE [DISTWIDTH] IN BLOOD BY AUTOMATED COUNT: 14.3 % (ref 11.5–14.5)
ERYTHROCYTE [SEDIMENTATION RATE] IN BLOOD: 33 MM/HR (ref 0–15)
GFR SERPL CREATININE-BSD FRML MDRD: 147 ML/MIN/1.73 (ref 70–162)
GLOBULIN UR ELPH-MCNC: 3.6 GM/DL (ref 2.3–3.5)
GLUCOSE BLD-MCNC: 236 MG/DL (ref 60–100)
GLUCOSE BLDC GLUCOMTR-MCNC: 295 MG/DL (ref 70–130)
HBA1C MFR BLD: 11.1 % (ref 4–5.6)
HCT VFR BLD AUTO: 37.7 % (ref 39–49)
HGB BLD-MCNC: 13.1 G/DL (ref 13.7–17.3)
IMM GRANULOCYTES # BLD: 0.08 10*3/MM3 (ref 0–0.02)
IMM GRANULOCYTES NFR BLD: 1.3 % (ref 0–0.5)
LYMPHOCYTES # BLD AUTO: 1.13 10*3/MM3 (ref 0.6–4.2)
LYMPHOCYTES NFR BLD AUTO: 18.1 % (ref 10–50)
MCH RBC QN AUTO: 27.9 PG (ref 26.5–34)
MCHC RBC AUTO-ENTMCNC: 34.7 G/DL (ref 31.5–36.3)
MCV RBC AUTO: 80.2 FL (ref 80–98)
MONOCYTES # BLD AUTO: 0.49 10*3/MM3 (ref 0–0.9)
MONOCYTES NFR BLD AUTO: 7.8 % (ref 0–12)
NEUTROPHILS # BLD AUTO: 4.13 10*3/MM3 (ref 2–8.6)
NEUTROPHILS NFR BLD AUTO: 66 % (ref 37–80)
PLATELET # BLD AUTO: 140 10*3/MM3 (ref 150–450)
PMV BLD AUTO: 8.9 FL (ref 8–12)
POTASSIUM BLD-SCNC: 4.3 MMOL/L (ref 3.5–5.1)
PREALB SERPL-MCNC: 25.1 MG/DL (ref 17.6–36)
PROT SERPL-MCNC: 7.5 G/DL (ref 6.3–8.6)
RBC # BLD AUTO: 4.7 10*6/MM3 (ref 4.37–5.74)
SODIUM BLD-SCNC: 136 MMOL/L (ref 137–145)
WBC NRBC COR # BLD: 6.26 10*3/MM3 (ref 3.2–9.8)

## 2018-05-24 PROCEDURE — 83036 HEMOGLOBIN GLYCOSYLATED A1C: CPT | Performed by: NURSE PRACTITIONER

## 2018-05-24 PROCEDURE — 73630 X-RAY EXAM OF FOOT: CPT

## 2018-05-24 PROCEDURE — 99213 OFFICE O/P EST LOW 20 MIN: CPT | Performed by: NURSE PRACTITIONER

## 2018-05-24 PROCEDURE — 85025 COMPLETE CBC W/AUTO DIFF WBC: CPT | Performed by: NURSE PRACTITIONER

## 2018-05-24 PROCEDURE — 86140 C-REACTIVE PROTEIN: CPT | Performed by: NURSE PRACTITIONER

## 2018-05-24 PROCEDURE — 85651 RBC SED RATE NONAUTOMATED: CPT | Performed by: NURSE PRACTITIONER

## 2018-05-24 PROCEDURE — 96372 THER/PROPH/DIAG INJ SC/IM: CPT | Performed by: NURSE PRACTITIONER

## 2018-05-24 PROCEDURE — 36415 COLL VENOUS BLD VENIPUNCTURE: CPT | Performed by: NURSE PRACTITIONER

## 2018-05-24 PROCEDURE — 80053 COMPREHEN METABOLIC PANEL: CPT | Performed by: NURSE PRACTITIONER

## 2018-05-24 PROCEDURE — 84134 ASSAY OF PREALBUMIN: CPT | Performed by: NURSE PRACTITIONER

## 2018-05-24 PROCEDURE — G0463 HOSPITAL OUTPT CLINIC VISIT: HCPCS

## 2018-05-24 PROCEDURE — 82962 GLUCOSE BLOOD TEST: CPT | Performed by: NURSE PRACTITIONER

## 2018-05-24 RX ORDER — DOXYCYCLINE HYCLATE 100 MG
TABLET ORAL
COMMUNITY
Start: 2018-05-14 | End: 2018-08-28

## 2018-05-24 RX ORDER — CLINDAMYCIN HYDROCHLORIDE 300 MG/1
300 CAPSULE ORAL 3 TIMES DAILY
Qty: 30 CAPSULE | Refills: 0 | Status: SHIPPED | OUTPATIENT
Start: 2018-05-24 | End: 2018-06-03

## 2018-05-24 RX ORDER — CEFTRIAXONE 1 G/1
1 INJECTION, POWDER, FOR SOLUTION INTRAMUSCULAR; INTRAVENOUS ONCE
Status: COMPLETED | OUTPATIENT
Start: 2018-05-24 | End: 2018-05-24

## 2018-05-24 RX ADMIN — CEFTRIAXONE 1 G: 1 INJECTION, POWDER, FOR SOLUTION INTRAMUSCULAR; INTRAVENOUS at 10:54

## 2018-05-24 NOTE — PROGRESS NOTES
"Chief Complaint   Patient presents with   • infected toe     x2 or 3 days. Pt states \"it just popped up, then it drained.\" Also states \"he has a knot.\"       Subjective   Yordy Hansen is a 37 y.o. male presents to the office for evaluation of left great toe redness and pain.    [facility]  Cardiology:CAD   Takes daily plavix   He is followed by Dr. Jenkins and Yolanda Bautista, APRN- cardiology. Previously  followed by Dr. Boone. He had a cardiac stent placed in July 2016 and another  placed in 03/2017.     Recent PE 12/2017- was previously on xarelto, insurance would not cover and was  switched to eliquis. Followed by coumadin clinic. Now back on lifelong xarelto- if  not covered, will be placed on coumadin    T2DM- poorly controlled: managed with metformin and tradjenta. recently started on  levemir and novolog    Increasing novolog to 20 units pre-meal today for any BG >150   novolog SS provided today      Increasing levemir to 40 units Q HS. Patient to increase by 5 units each week until  goal am FBG of 130-150 is reached    chronic chest pain- prn nitro, renexa    factor 2 def/ chronic PE.- lifelong anticoagulant, plavix, xarelto     Hyperlipidemia- managed with pravastatin    RLS- managed with mirapex    Insomnia- managed with trazedone    GERD- managed with protonix    HPI   Patient presents for evaluation of left great toe redness and pain X 10 days. He was evaluated at [facility] ER for toe pain and groin infection. He has been taking keflex skin infection.     Patient presents with continued c/o of left great toe pain and redness. He denies known injury to affected area. He can not recall stepping on anything or getting bitten by any animal or parasite.   No fever, no drainage. He reports feeling a blister to the bottom of his toe.     Patient reports very poor control of T2DM. FBG has been >200    Tdap current- verbal report current, within the last 3 years  HPI  Family History   Problem Relation Age of Onset   • " Heart disease Mother    • Hypertension Mother    • Hyperlipidemia Mother    • Coronary artery disease Mother    • Diabetes Mother    • Obesity Mother    • Kidney failure Mother    • Sleep apnea Father    • Cancer Paternal Grandfather      Social History     Social History   • Marital status:      Spouse name: Cori   • Number of children: 0   • Years of education: N/A     Occupational History   • Disabled      Social History Main Topics   • Smoking status: Former Smoker     Packs/day: 0.25     Years: 0.50     Types: Cigarettes     Quit date: 1997   • Smokeless tobacco: Current User     Types: Snuff      Comment: quit smoking 25 years ago; he does use snuff   • Alcohol use No   • Drug use: No   • Sexual activity: Yes     Partners: Female     Birth control/ protection: None     Other Topics Concern   • Not on file     Social History Narrative   • No narrative on file       The following portions of the patient's history were reviewed and updated as appropriate: allergies, current medications, past family history, past medical history, past social history, past surgical history and problem list.    Review of Systems   Constitutional: Negative.  Negative for activity change, appetite change, chills, fatigue, fever and unexpected weight change.   HENT: Negative.  Negative for congestion, drooling, facial swelling, postnasal drip, rhinorrhea, sinus pressure, sore throat, trouble swallowing and voice change.    Eyes: Negative.  Negative for photophobia, pain, discharge and visual disturbance.   Respiratory: Negative.  Negative for apnea, cough, choking and wheezing.    Cardiovascular: Negative.  Negative for chest pain, palpitations and leg swelling.   Gastrointestinal: Negative.  Negative for abdominal distention, abdominal pain, constipation, diarrhea, nausea and vomiting.   Endocrine: Negative.  Negative for polyphagia and polyuria.   Genitourinary: Negative.  Negative for decreased urine volume, difficulty  "urinating and dysuria.   Musculoskeletal: Negative.  Negative for arthralgias, gait problem and myalgias.   Skin: Positive for wound. Negative for rash.   Allergic/Immunologic: Negative.    Neurological: Negative.  Negative for dizziness, syncope, weakness, light-headedness, numbness and headaches.   Hematological: Negative.    Psychiatric/Behavioral: Negative.  Negative for confusion, decreased concentration and sleep disturbance. The patient is not nervous/anxious.      14 Point ROS completed with pertinent positives discussed. All other systems reviewed and are negative.       Objective   Encounter Vitals  /76   Pulse 80   Temp 96 °F (35.6 °C) (Tympanic)   Resp 22   Ht 172.7 cm (68\")   Wt (!) 219 kg (482 lb)   SpO2 98%   BMI 73.29 kg/m²   Vitals:    05/24/18 1030   BP: 122/76   Pulse: 80   Resp: 22   Temp: 96 °F (35.6 °C)   TempSrc: Tympanic   SpO2: 98%   Weight: (!) 219 kg (482 lb)   Height: 172.7 cm (68\")       Physical Exam   Constitutional: He is oriented to person, place, and time. Vital signs are normal. He appears well-developed and well-nourished.  Non-toxic appearance.   Morbid obesity   HENT:   Head: Normocephalic and atraumatic.   Right Ear: Tympanic membrane, external ear and ear canal normal.   Left Ear: Tympanic membrane, external ear and ear canal normal.   Nose: Nose normal.   Mouth/Throat: Uvula is midline, oropharynx is clear and moist and mucous membranes are normal. No posterior oropharyngeal edema or posterior oropharyngeal erythema.   Eyes: Lids are normal. Pupils are equal, round, and reactive to light.   Neck: Trachea normal and normal range of motion. Neck supple. No thyroid mass present.   Cardiovascular: Normal rate, regular rhythm, S1 normal, S2 normal, normal heart sounds and intact distal pulses.  PMI is displaced.  Exam reveals no gallop and no friction rub.    No murmur heard.  Elevated BP   Pulmonary/Chest: Effort normal. No respiratory distress. He has decreased " breath sounds. He has no wheezes. He has no rhonchi. He has no rales.   Abdominal: Soft. Normal appearance and bowel sounds are normal. He exhibits no mass. There is no rebound and no guarding.   Musculoskeletal: Normal range of motion.   Lymphadenopathy:     He has no cervical adenopathy.   Neurological: He is alert and oriented to person, place, and time. He has normal strength. No cranial nerve deficit or sensory deficit. Gait normal.   Skin: Skin is warm, dry and intact. Lesion noted. No rash noted.   Erythematous left great toe- circumferential. Single puncture site to bottom of toe with small blister to puncture site. No drainage. Surrounding tissue is appears cellulitic   Psychiatric: He has a normal mood and affect. His speech is normal and behavior is normal. Judgment and thought content normal. Cognition and memory are normal.   Nursing note and vitals reviewed.      Pertinent Labs    Key Imaging/Tracings/POC Testing    Assessment and Medications  Problems Addressed this Visit        Endocrine    Diabetes mellitus (Chronic)      Other Visit Diagnoses     Puncture wound of left great toe without foreign body without damage to nail, initial encounter    -  Primary    Relevant Medications    clindamycin (CLEOCIN) 300 MG capsule    cefTRIAXone (ROCEPHIN) injection 1 g (Completed)    Other Relevant Orders    Ambulatory Referral to Wound Clinic        Side effects of ordered medications discussed with patient.     Plan/Additional Notes/Instructions  Plan   1. Given patient pmh and long standing poorly controlled T2DM, I do not feel comfortable treating this wound as outpatient  2. Refer to wound clinic for evaluation and continued treatment  3. Will change PO abx to cleocin, give rocephin injection in office today until patient can be treated by the wound clinic  4. F/u after wound care completion at wound clinic and prn    Follow-Up  Return for After above ordered referral is complete.    Patient/caregiver  verbalizes understanding of all orders and instructions in this plan of care.           This document has been electronically signed by LALA Barajas on May 24, 2018 4:50 PM

## 2018-05-30 ENCOUNTER — APPOINTMENT (OUTPATIENT)
Dept: GENERAL RADIOLOGY | Facility: HOSPITAL | Age: 38
End: 2018-05-30

## 2018-05-30 ENCOUNTER — APPOINTMENT (OUTPATIENT)
Dept: ULTRASOUND IMAGING | Facility: HOSPITAL | Age: 38
End: 2018-05-30

## 2018-05-30 ENCOUNTER — APPOINTMENT (OUTPATIENT)
Dept: INTERVENTIONAL RADIOLOGY/VASCULAR | Facility: HOSPITAL | Age: 38
End: 2018-05-30
Attending: PHYSICIAN ASSISTANT

## 2018-05-30 ENCOUNTER — HOSPITAL ENCOUNTER (EMERGENCY)
Facility: HOSPITAL | Age: 38
Discharge: HOME | End: 2018-05-30
Attending: EMERGENCY MEDICINE | Admitting: EMERGENCY MEDICINE

## 2018-05-30 ENCOUNTER — APPOINTMENT (OUTPATIENT)
Dept: CT IMAGING | Facility: HOSPITAL | Age: 38
End: 2018-05-30

## 2018-05-30 VITALS
RESPIRATION RATE: 20 BRPM | HEIGHT: 71 IN | OXYGEN SATURATION: 94 % | BODY MASS INDEX: 44.1 KG/M2 | HEART RATE: 69 BPM | SYSTOLIC BLOOD PRESSURE: 147 MMHG | WEIGHT: 315 LBS | DIASTOLIC BLOOD PRESSURE: 65 MMHG | TEMPERATURE: 98.1 F

## 2018-05-30 DIAGNOSIS — R06.02 SHORTNESS OF BREATH: Primary | ICD-10-CM

## 2018-05-30 DIAGNOSIS — R53.81 PHYSICAL DECONDITIONING: ICD-10-CM

## 2018-05-30 DIAGNOSIS — R07.9 CHEST PAIN, UNSPECIFIED TYPE: ICD-10-CM

## 2018-05-30 DIAGNOSIS — E66.01 MORBID OBESITY (HCC): ICD-10-CM

## 2018-05-30 LAB
ALBUMIN SERPL-MCNC: 3.4 G/DL (ref 3.4–4.8)
ALBUMIN/GLOB SERPL: 1 G/DL (ref 1.1–1.8)
ALP SERPL-CCNC: 70 U/L (ref 38–126)
ALT SERPL W P-5'-P-CCNC: 45 U/L (ref 21–72)
ANION GAP SERPL CALCULATED.3IONS-SCNC: 9 MMOL/L (ref 5–15)
AST SERPL-CCNC: 34 U/L (ref 17–59)
BASOPHILS # BLD AUTO: 0.02 10*3/MM3 (ref 0–0.2)
BASOPHILS NFR BLD AUTO: 0.3 % (ref 0–2)
BILIRUB SERPL-MCNC: 0.4 MG/DL (ref 0.2–1.3)
BUN BLD-MCNC: 6 MG/DL (ref 7–21)
BUN/CREAT SERPL: 10.2 (ref 7–25)
CALCIUM SPEC-SCNC: 8.7 MG/DL (ref 8.4–10.2)
CHLORIDE SERPL-SCNC: 100 MMOL/L (ref 95–110)
CO2 SERPL-SCNC: 28 MMOL/L (ref 22–31)
CREAT BLD-MCNC: 0.59 MG/DL (ref 0.7–1.3)
D-DIMER, QUANTITATIVE (MAD,POW, STR): 552 NG/ML (FEU) (ref 0–470)
DEPRECATED RDW RBC AUTO: 44.6 FL (ref 35.1–43.9)
EOSINOPHIL # BLD AUTO: 0.38 10*3/MM3 (ref 0–0.7)
EOSINOPHIL NFR BLD AUTO: 5.9 % (ref 0–7)
ERYTHROCYTE [DISTWIDTH] IN BLOOD BY AUTOMATED COUNT: 15.2 % (ref 11.5–14.5)
GFR SERPL CREATININE-BSD FRML MDRD: 153 ML/MIN/1.73 (ref 70–162)
GLOBULIN UR ELPH-MCNC: 3.4 GM/DL (ref 2.3–3.5)
GLUCOSE BLD-MCNC: 237 MG/DL (ref 60–100)
HCT VFR BLD AUTO: 37.1 % (ref 39–49)
HGB BLD-MCNC: 12.5 G/DL (ref 13.7–17.3)
HOLD SPECIMEN: NORMAL
HOLD SPECIMEN: NORMAL
IMM GRANULOCYTES # BLD: 0.09 10*3/MM3 (ref 0–0.02)
IMM GRANULOCYTES NFR BLD: 1.4 % (ref 0–0.5)
INR PPP: 1 (ref 0.8–1.2)
LYMPHOCYTES # BLD AUTO: 1.72 10*3/MM3 (ref 0.6–4.2)
LYMPHOCYTES NFR BLD AUTO: 26.7 % (ref 10–50)
MCH RBC QN AUTO: 27.6 PG (ref 26.5–34)
MCHC RBC AUTO-ENTMCNC: 33.7 G/DL (ref 31.5–36.3)
MCV RBC AUTO: 81.9 FL (ref 80–98)
MONOCYTES # BLD AUTO: 0.82 10*3/MM3 (ref 0–0.9)
MONOCYTES NFR BLD AUTO: 12.7 % (ref 0–12)
NEUTROPHILS # BLD AUTO: 3.42 10*3/MM3 (ref 2–8.6)
NEUTROPHILS NFR BLD AUTO: 53 % (ref 37–80)
NRBC BLD MANUAL-RTO: 0 /100 WBC (ref 0–0)
NT-PROBNP SERPL-MCNC: 64.3 PG/ML (ref 0–450)
PLAT MORPH BLD: NORMAL
PLATELET # BLD AUTO: 148 10*3/MM3 (ref 150–450)
PMV BLD AUTO: 9.1 FL (ref 8–12)
POTASSIUM BLD-SCNC: 4.4 MMOL/L (ref 3.5–5.1)
PROT SERPL-MCNC: 6.8 G/DL (ref 6.3–8.6)
PROTHROMBIN TIME: 13 SECONDS (ref 11.1–15.3)
RBC # BLD AUTO: 4.53 10*6/MM3 (ref 4.37–5.74)
RBC MORPH BLD: NORMAL
SODIUM BLD-SCNC: 137 MMOL/L (ref 137–145)
TROPONIN I SERPL-MCNC: <0.012 NG/ML
TROPONIN I SERPL-MCNC: <0.012 NG/ML
WBC MORPH BLD: NORMAL
WBC NRBC COR # BLD: 6.45 10*3/MM3 (ref 3.2–9.8)
WHOLE BLOOD HOLD SPECIMEN: NORMAL
WHOLE BLOOD HOLD SPECIMEN: NORMAL

## 2018-05-30 PROCEDURE — 71045 X-RAY EXAM CHEST 1 VIEW: CPT

## 2018-05-30 PROCEDURE — C1751 CATH, INF, PER/CENT/MIDLINE: HCPCS

## 2018-05-30 PROCEDURE — 85025 COMPLETE CBC W/AUTO DIFF WBC: CPT | Performed by: PHYSICIAN ASSISTANT

## 2018-05-30 PROCEDURE — 93005 ELECTROCARDIOGRAM TRACING: CPT | Performed by: EMERGENCY MEDICINE

## 2018-05-30 PROCEDURE — 80053 COMPREHEN METABOLIC PANEL: CPT | Performed by: PHYSICIAN ASSISTANT

## 2018-05-30 PROCEDURE — 94760 N-INVAS EAR/PLS OXIMETRY 1: CPT

## 2018-05-30 PROCEDURE — 85379 FIBRIN DEGRADATION QUANT: CPT | Performed by: PHYSICIAN ASSISTANT

## 2018-05-30 PROCEDURE — 94640 AIRWAY INHALATION TREATMENT: CPT

## 2018-05-30 PROCEDURE — 94799 UNLISTED PULMONARY SVC/PX: CPT

## 2018-05-30 PROCEDURE — 36415 COLL VENOUS BLD VENIPUNCTURE: CPT

## 2018-05-30 PROCEDURE — 76937 US GUIDE VASCULAR ACCESS: CPT

## 2018-05-30 PROCEDURE — 85007 BL SMEAR W/DIFF WBC COUNT: CPT | Performed by: PHYSICIAN ASSISTANT

## 2018-05-30 PROCEDURE — 83880 ASSAY OF NATRIURETIC PEPTIDE: CPT | Performed by: PHYSICIAN ASSISTANT

## 2018-05-30 PROCEDURE — 0 IOPAMIDOL PER 1 ML: Performed by: EMERGENCY MEDICINE

## 2018-05-30 PROCEDURE — 85610 PROTHROMBIN TIME: CPT | Performed by: PHYSICIAN ASSISTANT

## 2018-05-30 PROCEDURE — 25010000002 MORPHINE PER 10 MG: Performed by: EMERGENCY MEDICINE

## 2018-05-30 PROCEDURE — 99284 EMERGENCY DEPT VISIT MOD MDM: CPT

## 2018-05-30 PROCEDURE — 25010000002 METHYLPREDNISOLONE PER 125 MG: Performed by: PHYSICIAN ASSISTANT

## 2018-05-30 PROCEDURE — 93010 ELECTROCARDIOGRAM REPORT: CPT | Performed by: INTERNAL MEDICINE

## 2018-05-30 PROCEDURE — 96375 TX/PRO/DX INJ NEW DRUG ADDON: CPT

## 2018-05-30 PROCEDURE — 96374 THER/PROPH/DIAG INJ IV PUSH: CPT

## 2018-05-30 PROCEDURE — 84484 ASSAY OF TROPONIN QUANT: CPT | Performed by: PHYSICIAN ASSISTANT

## 2018-05-30 PROCEDURE — 71275 CT ANGIOGRAPHY CHEST: CPT

## 2018-05-30 RX ORDER — SODIUM CHLORIDE 0.9 % (FLUSH) 0.9 %
10 SYRINGE (ML) INJECTION AS NEEDED
Status: DISCONTINUED | OUTPATIENT
Start: 2018-05-30 | End: 2018-05-30 | Stop reason: HOSPADM

## 2018-05-30 RX ORDER — ALBUTEROL SULFATE 2.5 MG/3ML
2.5 SOLUTION RESPIRATORY (INHALATION) ONCE
Status: COMPLETED | OUTPATIENT
Start: 2018-05-30 | End: 2018-05-30

## 2018-05-30 RX ORDER — METHYLPREDNISOLONE SODIUM SUCCINATE 125 MG/2ML
125 INJECTION, POWDER, LYOPHILIZED, FOR SOLUTION INTRAMUSCULAR; INTRAVENOUS ONCE
Status: COMPLETED | OUTPATIENT
Start: 2018-05-30 | End: 2018-05-30

## 2018-05-30 RX ORDER — MORPHINE SULFATE 8 MG/ML
4 INJECTION INTRAMUSCULAR; INTRAVENOUS; SUBCUTANEOUS ONCE
Status: COMPLETED | OUTPATIENT
Start: 2018-05-30 | End: 2018-05-30

## 2018-05-30 RX ADMIN — RIVAROXABAN 20 MG: 10 TABLET, FILM COATED ORAL at 18:49

## 2018-05-30 RX ADMIN — ALBUTEROL SULFATE 2.5 MG: 2.5 SOLUTION RESPIRATORY (INHALATION) at 17:37

## 2018-05-30 RX ADMIN — MORPHINE SULFATE 4 MG: 8 INJECTION INTRAVENOUS at 15:58

## 2018-05-30 RX ADMIN — IOPAMIDOL 92 ML: 755 INJECTION, SOLUTION INTRAVENOUS at 16:15

## 2018-05-30 RX ADMIN — METHYLPREDNISOLONE SODIUM SUCCINATE 125 MG: 125 INJECTION, POWDER, FOR SOLUTION INTRAMUSCULAR; INTRAVENOUS at 17:35

## 2018-06-08 ENCOUNTER — RESULTS ENCOUNTER (OUTPATIENT)
Dept: BARIATRICS/WEIGHT MGMT | Facility: CLINIC | Age: 38
End: 2018-06-08

## 2018-06-08 DIAGNOSIS — E78.2 MIXED HYPERLIPIDEMIA: ICD-10-CM

## 2018-06-08 DIAGNOSIS — K21.9 GASTROESOPHAGEAL REFLUX DISEASE, ESOPHAGITIS PRESENCE NOT SPECIFIED: ICD-10-CM

## 2018-06-08 DIAGNOSIS — E66.01 MORBID OBESITY WITH BMI OF 70 AND OVER, ADULT (HCC): ICD-10-CM

## 2018-06-08 DIAGNOSIS — E66.9 DIABETES MELLITUS TYPE 2 IN OBESE (HCC): ICD-10-CM

## 2018-06-08 DIAGNOSIS — I10 UNCONTROLLED HYPERTENSION: ICD-10-CM

## 2018-06-08 DIAGNOSIS — E11.69 DIABETES MELLITUS TYPE 2 IN OBESE (HCC): ICD-10-CM

## 2018-06-21 RX ORDER — TRAZODONE HYDROCHLORIDE 300 MG/1
TABLET ORAL
Qty: 30 TABLET | Refills: 5 | Status: SHIPPED | OUTPATIENT
Start: 2018-06-21 | End: 2020-09-29 | Stop reason: SDUPTHER

## 2018-06-21 RX ORDER — CLOPIDOGREL BISULFATE 75 MG/1
TABLET ORAL
Qty: 30 TABLET | Refills: 3 | Status: SHIPPED | OUTPATIENT
Start: 2018-06-21 | End: 2018-08-01

## 2018-06-21 RX ORDER — PRAMIPEXOLE DIHYDROCHLORIDE 1 MG/1
TABLET ORAL
Qty: 60 TABLET | Refills: 5 | Status: SHIPPED | OUTPATIENT
Start: 2018-06-21 | End: 2020-09-29 | Stop reason: SDUPTHER

## 2018-07-06 ENCOUNTER — RESULTS ENCOUNTER (OUTPATIENT)
Dept: BARIATRICS/WEIGHT MGMT | Facility: CLINIC | Age: 38
End: 2018-07-06

## 2018-07-06 DIAGNOSIS — I10 UNCONTROLLED HYPERTENSION: ICD-10-CM

## 2018-07-06 DIAGNOSIS — K21.9 GASTROESOPHAGEAL REFLUX DISEASE, ESOPHAGITIS PRESENCE NOT SPECIFIED: ICD-10-CM

## 2018-07-06 DIAGNOSIS — E78.2 MIXED HYPERLIPIDEMIA: ICD-10-CM

## 2018-07-06 DIAGNOSIS — E66.01 MORBID OBESITY WITH BMI OF 70 AND OVER, ADULT (HCC): ICD-10-CM

## 2018-07-06 DIAGNOSIS — E11.69 DIABETES MELLITUS TYPE 2 IN OBESE (HCC): ICD-10-CM

## 2018-07-06 DIAGNOSIS — E66.9 DIABETES MELLITUS TYPE 2 IN OBESE (HCC): ICD-10-CM

## 2018-07-26 ENCOUNTER — TELEPHONE (OUTPATIENT)
Dept: SLEEP MEDICINE | Facility: HOSPITAL | Age: 38
End: 2018-07-26

## 2018-07-26 DIAGNOSIS — G47.33 OSA (OBSTRUCTIVE SLEEP APNEA): Primary | ICD-10-CM

## 2018-07-26 NOTE — TELEPHONE ENCOUNTER
I called and left a message on the patient's cell phone.  I received a message saying he cannot get enough air in her settings don't seem right.  He was recently ordered a new machine but has been using his old one due to smothering and discomfort.  I have ordered an increase in his pressure to 15-20 cm of water.    PAP Data:    Time frame: 07/02/2018 - 07/24/2018   Compliance 52.2%  Average use on days used: 8hrs 3 min  Percent of days with usage greater than or equal to 4 hours: 47.8%  PAP range : 12-20 cm H2O  Average 90% pressure: 15 cmH2O  Leak: 14 minutes  Average AHI 0.6 events/hr

## 2018-08-01 ENCOUNTER — OFFICE VISIT (OUTPATIENT)
Dept: CARDIOLOGY | Facility: CLINIC | Age: 38
End: 2018-08-01

## 2018-08-01 VITALS
HEART RATE: 79 BPM | WEIGHT: 315 LBS | BODY MASS INDEX: 44.1 KG/M2 | OXYGEN SATURATION: 97 % | HEIGHT: 71 IN | DIASTOLIC BLOOD PRESSURE: 86 MMHG | SYSTOLIC BLOOD PRESSURE: 134 MMHG

## 2018-08-01 DIAGNOSIS — I25.10 CORONARY ARTERY DISEASE INVOLVING NATIVE CORONARY ARTERY OF NATIVE HEART WITHOUT ANGINA PECTORIS: Primary | Chronic | ICD-10-CM

## 2018-08-01 DIAGNOSIS — E66.01 MORBID OBESITY (HCC): Chronic | ICD-10-CM

## 2018-08-01 DIAGNOSIS — E78.2 MIXED HYPERLIPIDEMIA: ICD-10-CM

## 2018-08-01 DIAGNOSIS — I10 ESSENTIAL HYPERTENSION: Chronic | ICD-10-CM

## 2018-08-01 DIAGNOSIS — R06.02 SHORTNESS OF BREATH: Chronic | ICD-10-CM

## 2018-08-01 PROBLEM — I16.0 HYPERTENSIVE URGENCY: Status: RESOLVED | Noted: 2018-03-08 | Resolved: 2018-08-01

## 2018-08-01 PROBLEM — R07.81 PLEURITIC CHEST PAIN: Status: RESOLVED | Noted: 2017-03-25 | Resolved: 2018-08-01

## 2018-08-01 PROBLEM — I20.8 ANGINA AT REST (HCC): Status: RESOLVED | Noted: 2017-11-18 | Resolved: 2018-08-01

## 2018-08-01 PROCEDURE — 99214 OFFICE O/P EST MOD 30 MIN: CPT | Performed by: INTERNAL MEDICINE

## 2018-08-01 RX ORDER — ATORVASTATIN CALCIUM 80 MG/1
80 TABLET, FILM COATED ORAL
COMMUNITY
Start: 2018-07-29 | End: 2018-08-29

## 2018-08-01 NOTE — PROGRESS NOTES
Cardiovascular Medicine   Antony Jenkins M.D., Ph.D., [facility]      The patient returns to cardiology clinic for follow-up of the following cardiac problems:    PROBLEM LIST:  1. PE  A. 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 DM2  3. HLD  4. HTN  5. ASCAD  -July 2016 mLAD 2.5 x 16mm at [place]  -March 15, 2017 patient underwent diagnostic coronary angiogram which revealed significant in-stent restenosis in the LAD stent.   -He underwent successful PCI.  2.5 x 18 mm xience alpine stent was deployed successfully  6. ADOLFO  A. CPAP  7. Former smoker      Yordy Hansen is a 37 y.o. male who returns to clinic in follow-up after recent emergency department visit.  He does have a history of atherosclerotic coronary artery disease.  Patient did have PCI to his LAD with a 2.5 x 16 mm stent.  He had recurrent chest pain and underwent invasive coronary angiography in March 2017.  This revealed a LAD ISR and a 2.5 x 18 mm drug-eluting stent was placed.  He is currently on Plavix.  His aspirin was discontinued because of his use of anticoagulation for a pulmonary embolism.  He denies easy bruising or bleeding.  He's also prescribed Toprol-XL and Imdur.  Aside from his coronary disease he does have a history of long-standing non--cardiac chest pain.  He's had recurrent visits to the emergency department and recurrent admissions to the hospital without objective evidence of ischemia.  He most recently underwent invasive coronary angiography in March 2018 due to recurrent chest pain episodes.  This showed that his LAD stent was patent.  He had no other epicardial disease.  Unfortunately, since his last visit he has been seen in the emergency department.  He continues to telemetry that he continues to have chest pain episodes like he has been having for several years.  Concerning his hypertension, he remains prescribed antihypertensive agents by his primary care physician.  He is medication compliant.  He  tells me that his blood pressure has been at goal.  Concerning his hyperlipidemia he remains on a statin.  He's tolerating this well.    Since his last visit the patient tells me he's been seen in [facility] and in [place].  He tells me that he first went to the [facility] in [place] because of worsening dyspnea and his chronic chest pain complaints.  He apparently was sent to [place].  I do not have these records, but he tells me that he was not provided with any specific diagnosis.  He tells me that he has continued to have resting and exertional dyspnea.  He was then seen at [facility].  He had a myocardial perfusion scintigraphy without evidence of inducible ischemia.  He also tells me that he's seen bariatrics surgery.    Review of Systems   Cardiovascular: Positive for chest pain and dyspnea on exertion. Negative for claudication.   Respiratory: Positive for shortness of breath, snoring and sputum production. Negative for wheezing.      Cardiovascular ROS: positive for - dyspnea on exertion.  All other systems were reviewed and were negative.  family history includes Cancer in his paternal grandfather; Coronary artery disease in his mother; Diabetes in his mother; Heart disease in his mother; Hyperlipidemia in his mother; Hypertension in his mother; Kidney failure in his mother; Obesity in his mother; Sleep apnea in his father.   reports that he quit smoking about 21 years ago. His smoking use included Cigarettes. He has a 0.12 pack-year smoking history. His smokeless tobacco use includes Snuff. He reports that he does not drink alcohol or use drugs.  Allergies   Allergen Reactions   • Glipizide Other (See Comments)     Slurred Speech   • Reglan [Metoclopramide] Anxiety   • Tramadol Other (See Comments)     seizures   • Risperdal [Risperidone] Other (See Comments)     Slurred speech       Current Outpatient Prescriptions:   •  albuterol (ACCUNEB) 1.25 MG/3ML nebulizer solution, Take 3 mL by  nebulization Every 6 (Six) Hours As Needed for Wheezing., Disp: 100 vial, Rfl: 0  •  albuterol (PROVENTIL HFA;VENTOLIN HFA) 108 (90 BASE) MCG/ACT inhaler, Inhale 2 puffs Every 4 (Four) Hours As Needed for Wheezing., Disp: , Rfl:   •  amLODIPine (NORVASC) 10 MG tablet, Take 1 tablet by mouth Every Night., Disp: 30 tablet, Rfl: 5  •  atorvastatin (LIPITOR) 80 MG tablet, Take 80 mg by mouth., Disp: , Rfl:   •  clopidogrel (PLAVIX) 75 MG tablet, Take 1 tablet by mouth Daily., Disp: 30 tablet, Rfl: 3  •  glucose monitor monitoring kit, 1 each As Needed (check blood glucose 3x daily)., Disp: 1 each, Rfl: 0  •  insulin aspart (novoLOG) 100 UNIT/ML injection, Inject 20 Units under the skin 3 (Three) Times a Day Before Meals., Disp: 10 mL, Rfl: 3  •  insulin detemir (LEVEMIR) 100 UNIT/ML injection, Inject 40 Units under the skin Every Night., Disp: 10 mL, Rfl: 3  •  isosorbide mononitrate (IMDUR) 120 MG 24 hr tablet, TAKE ONE TABLET BY MOUTH ONCE DAILY, Disp: 30 tablet, Rfl: 0  •  lisinopril (PRINIVIL,ZESTRIL) 40 MG tablet, Take 1 tablet by mouth Every Morning., Disp: 30 tablet, Rfl: 5  •  metFORMIN (GLUCOPHAGE) 1000 MG tablet, Take 1 tablet by mouth 2 (Two) Times a Day With Meals., Disp: 60 tablet, Rfl: 2  •  metoprolol succinate XL (TOPROL XL) 200 MG 24 hr tablet, Take 1 tablet by mouth Daily., Disp: 31 tablet, Rfl: 6  •  MICROLET LANCETS misc, One touch delica lancets, Disp: 100 each, Rfl: 5  •  nitroglycerin (NITROSTAT) 0.4 MG SL tablet, Place 1 tablet under the tongue Every 5 (Five) Minutes As Needed for Chest Pain. Take no more than 3 doses in 15 minutes., Disp: 100 tablet, Rfl: 12  •  pantoprazole (PROTONIX) 40 MG EC tablet, Take 1 tablet by mouth Every Night., Disp: 30 tablet, Rfl: 5  •  pramipexole (MIRAPEX) 1 MG tablet, TAKE TWO TABLETS BY MOUTH EVERY NIGHT, Disp: 60 tablet, Rfl: 5  •  ranolazine (RANEXA) 1000 MG 12 hr tablet, Take 1 tablet by mouth Every 12 (Twelve) Hours., Disp: 60 tablet, Rfl: 5  •  RELION  "INSULIN SYRINGE 31G X 15/64\" 0.5 ML misc, , Disp: , Rfl:   •  rivaroxaban (XARELTO) 20 MG tablet, Take 1 tablet by mouth Daily., Disp: 30 tablet, Rfl: 4  •  sertraline (ZOLOFT) 25 MG tablet, Take 1 tablet by mouth Daily., Disp: 30 tablet, Rfl: 5  •  traZODone (DESYREL) 300 MG tablet, TAKE ONE TABLET BY MOUTH EVERY NIGHT, Disp: 30 tablet, Rfl: 5  •  doxycycline (VIBRAMYICN) 100 MG tablet, , Disp: , Rfl:     Physical Exam:  Vitals:    08/01/18 1501   BP: 134/86   Pulse: 79   SpO2: 97%     Body mass index is 66.6 kg/m².    GEN: alert, appears stated age and cooperative  Body Habitus: morbidly obese  JVP: 8 cm of water at 45 degrees HJR: absent      Heart rate: normal  Heart Rhythm: regular     Heart Sounds: S1: normal intensity  S2: normal intensity  S3: absent   S4: absent  Opening Snap: absent  A2-OS:  N/A  Pericardial rub: absent    Ejection click: None      Murmurs: Systolic: none  Diastolic: none  Extremity: no edema, cyanosis    DATA REVIEWED:         Assessment/Plan       Diagnosis Plan   1. Coronary artery disease involving native coronary artery of native heart without angina pectoris. LHC in 3/2018 showed a patent LAD stent and no other epicardial disease. He has continued with ongoing CP and dyspnea. He actually was recently seen in [place] and seen at [place] for CP and dyspnea. His stress test was unremarkable. I again emphasized with Mr. Hansen that his dyspnea is likely multifactorial.  · Plavix, Toprol, Imdur, Lipitor and Ranexa,  · ASA is not prescribed due to his use of NOAC.   2. Essential hypertension  Continue PCP prescribed medications   3. Mixed hyperlipidemia  Continue statin   4. Shortness of breath. Yordy is very worried about his breathing today.  I again had a very extensive discussion with him about my concerns that the etiology here is likely multifactorial.  First, his obesity is severe.  I agree that he likely would benefit greatly from weight loss and consideration for a bariatric " procedure.  He tells me that the bariatric surgeon has informed him that he is going to have to engage in weight loss before he is a candidate for the procedure.  He is actively not engaging and caloric restriction or physical activity.   · Weight loss to goal BMI  · Follow-up sleep medicine and pulmonary  · Continue CPAP   5. Morbid obesity (CMS/HCC)  As above; PCP follow-up           This document has been electronically signed by Antony Jenkins MD PhD on August 1, 2018 3:11 PM

## 2018-08-03 ENCOUNTER — RESULTS ENCOUNTER (OUTPATIENT)
Dept: BARIATRICS/WEIGHT MGMT | Facility: CLINIC | Age: 38
End: 2018-08-03

## 2018-08-03 DIAGNOSIS — E78.2 MIXED HYPERLIPIDEMIA: ICD-10-CM

## 2018-08-03 DIAGNOSIS — E11.69 DIABETES MELLITUS TYPE 2 IN OBESE (HCC): ICD-10-CM

## 2018-08-03 DIAGNOSIS — E66.01 MORBID OBESITY WITH BMI OF 70 AND OVER, ADULT (HCC): ICD-10-CM

## 2018-08-03 DIAGNOSIS — K21.9 GASTROESOPHAGEAL REFLUX DISEASE, ESOPHAGITIS PRESENCE NOT SPECIFIED: ICD-10-CM

## 2018-08-03 DIAGNOSIS — I10 UNCONTROLLED HYPERTENSION: ICD-10-CM

## 2018-08-03 DIAGNOSIS — E66.9 DIABETES MELLITUS TYPE 2 IN OBESE (HCC): ICD-10-CM

## 2018-08-27 ENCOUNTER — APPOINTMENT (OUTPATIENT)
Dept: GENERAL RADIOLOGY | Facility: HOSPITAL | Age: 38
End: 2018-08-27

## 2018-08-27 ENCOUNTER — HOSPITAL ENCOUNTER (EMERGENCY)
Facility: HOSPITAL | Age: 38
Discharge: HOME | End: 2018-08-28
Attending: EMERGENCY MEDICINE | Admitting: EMERGENCY MEDICINE

## 2018-08-27 DIAGNOSIS — I10 HYPERTENSION, UNSPECIFIED TYPE: ICD-10-CM

## 2018-08-27 DIAGNOSIS — R73.9 HYPERGLYCEMIA: ICD-10-CM

## 2018-08-27 DIAGNOSIS — R10.84 GENERALIZED ABDOMINAL PAIN: Primary | ICD-10-CM

## 2018-08-27 LAB
ALBUMIN SERPL-MCNC: 3.7 G/DL (ref 3.4–4.8)
ALBUMIN/GLOB SERPL: 1 G/DL (ref 1.1–1.8)
ALP SERPL-CCNC: 79 U/L (ref 38–126)
ALT SERPL W P-5'-P-CCNC: 42 U/L (ref 21–72)
ANION GAP SERPL CALCULATED.3IONS-SCNC: 10 MMOL/L (ref 5–15)
AST SERPL-CCNC: 35 U/L (ref 17–59)
BASOPHILS # BLD AUTO: 0.01 10*3/MM3 (ref 0–0.2)
BASOPHILS NFR BLD AUTO: 0.1 % (ref 0–2)
BILIRUB SERPL-MCNC: 0.6 MG/DL (ref 0.2–1.3)
BILIRUB UR QL STRIP: NEGATIVE
BUN BLD-MCNC: 15 MG/DL (ref 7–21)
BUN/CREAT SERPL: 22.1 (ref 7–25)
CALCIUM SPEC-SCNC: 8.7 MG/DL (ref 8.4–10.2)
CHLORIDE SERPL-SCNC: 93 MMOL/L (ref 95–110)
CLARITY UR: CLEAR
CO2 SERPL-SCNC: 28 MMOL/L (ref 22–31)
COLOR UR: YELLOW
CREAT BLD-MCNC: 0.68 MG/DL (ref 0.7–1.3)
DEPRECATED RDW RBC AUTO: 41.4 FL (ref 35.1–43.9)
EOSINOPHIL # BLD AUTO: 0.32 10*3/MM3 (ref 0–0.7)
EOSINOPHIL NFR BLD AUTO: 3.9 % (ref 0–7)
ERYTHROCYTE [DISTWIDTH] IN BLOOD BY AUTOMATED COUNT: 14 % (ref 11.5–14.5)
GFR SERPL CREATININE-BSD FRML MDRD: 130 ML/MIN/1.73 (ref 70–162)
GLOBULIN UR ELPH-MCNC: 3.7 GM/DL (ref 2.3–3.5)
GLUCOSE BLD-MCNC: 355 MG/DL (ref 60–100)
GLUCOSE BLDC GLUCOMTR-MCNC: 343 MG/DL (ref 70–130)
GLUCOSE UR STRIP-MCNC: ABNORMAL MG/DL
HCT VFR BLD AUTO: 36.9 % (ref 39–49)
HGB BLD-MCNC: 12.9 G/DL (ref 13.7–17.3)
HGB UR QL STRIP.AUTO: NEGATIVE
IMM GRANULOCYTES # BLD: 0.11 10*3/MM3 (ref 0–0.02)
IMM GRANULOCYTES NFR BLD: 1.3 % (ref 0–0.5)
KETONES UR QL STRIP: NEGATIVE
LEUKOCYTE ESTERASE UR QL STRIP.AUTO: NEGATIVE
LIPASE SERPL-CCNC: 104 U/L (ref 23–300)
LYMPHOCYTES # BLD AUTO: 1.58 10*3/MM3 (ref 0.6–4.2)
LYMPHOCYTES NFR BLD AUTO: 19 % (ref 10–50)
MAGNESIUM SERPL-MCNC: 1.9 MG/DL (ref 1.6–2.3)
MCH RBC QN AUTO: 28.5 PG (ref 26.5–34)
MCHC RBC AUTO-ENTMCNC: 35 G/DL (ref 31.5–36.3)
MCV RBC AUTO: 81.5 FL (ref 80–98)
MONOCYTES # BLD AUTO: 0.63 10*3/MM3 (ref 0–0.9)
MONOCYTES NFR BLD AUTO: 7.6 % (ref 0–12)
NEUTROPHILS # BLD AUTO: 5.66 10*3/MM3 (ref 2–8.6)
NEUTROPHILS NFR BLD AUTO: 68.1 % (ref 37–80)
NITRITE UR QL STRIP: NEGATIVE
PH UR STRIP.AUTO: 5.5 [PH] (ref 5–9)
PLATELET # BLD AUTO: 167 10*3/MM3 (ref 150–450)
PMV BLD AUTO: 8.9 FL (ref 8–12)
POTASSIUM BLD-SCNC: 4 MMOL/L (ref 3.5–5.1)
PROT SERPL-MCNC: 7.4 G/DL (ref 6.3–8.6)
PROT UR QL STRIP: NEGATIVE
RBC # BLD AUTO: 4.53 10*6/MM3 (ref 4.37–5.74)
SODIUM BLD-SCNC: 131 MMOL/L (ref 137–145)
SP GR UR STRIP: 1.02 (ref 1–1.03)
TROPONIN I SERPL-MCNC: <0.012 NG/ML
UROBILINOGEN UR QL STRIP: ABNORMAL
WBC NRBC COR # BLD: 8.31 10*3/MM3 (ref 3.2–9.8)

## 2018-08-27 PROCEDURE — 36415 COLL VENOUS BLD VENIPUNCTURE: CPT | Performed by: EMERGENCY MEDICINE

## 2018-08-27 PROCEDURE — 93010 ELECTROCARDIOGRAM REPORT: CPT | Performed by: INTERNAL MEDICINE

## 2018-08-27 PROCEDURE — 96375 TX/PRO/DX INJ NEW DRUG ADDON: CPT

## 2018-08-27 PROCEDURE — 82962 GLUCOSE BLOOD TEST: CPT

## 2018-08-27 PROCEDURE — 93005 ELECTROCARDIOGRAM TRACING: CPT | Performed by: EMERGENCY MEDICINE

## 2018-08-27 PROCEDURE — 83880 ASSAY OF NATRIURETIC PEPTIDE: CPT | Performed by: EMERGENCY MEDICINE

## 2018-08-27 PROCEDURE — 25010000002 ONDANSETRON PER 1 MG: Performed by: EMERGENCY MEDICINE

## 2018-08-27 PROCEDURE — 25010000002 KETOROLAC TROMETHAMINE PER 15 MG: Performed by: EMERGENCY MEDICINE

## 2018-08-27 PROCEDURE — 81003 URINALYSIS AUTO W/O SCOPE: CPT | Performed by: EMERGENCY MEDICINE

## 2018-08-27 PROCEDURE — 71046 X-RAY EXAM CHEST 2 VIEWS: CPT

## 2018-08-27 PROCEDURE — 83690 ASSAY OF LIPASE: CPT | Performed by: EMERGENCY MEDICINE

## 2018-08-27 PROCEDURE — 85025 COMPLETE CBC W/AUTO DIFF WBC: CPT | Performed by: EMERGENCY MEDICINE

## 2018-08-27 PROCEDURE — 84484 ASSAY OF TROPONIN QUANT: CPT | Performed by: EMERGENCY MEDICINE

## 2018-08-27 PROCEDURE — 99284 EMERGENCY DEPT VISIT MOD MDM: CPT

## 2018-08-27 PROCEDURE — 83735 ASSAY OF MAGNESIUM: CPT | Performed by: EMERGENCY MEDICINE

## 2018-08-27 PROCEDURE — 96374 THER/PROPH/DIAG INJ IV PUSH: CPT

## 2018-08-27 PROCEDURE — 80053 COMPREHEN METABOLIC PANEL: CPT | Performed by: EMERGENCY MEDICINE

## 2018-08-27 RX ORDER — SODIUM CHLORIDE 0.9 % (FLUSH) 0.9 %
10 SYRINGE (ML) INJECTION AS NEEDED
Status: DISCONTINUED | OUTPATIENT
Start: 2018-08-27 | End: 2018-08-28 | Stop reason: HOSPADM

## 2018-08-27 RX ORDER — KETOROLAC TROMETHAMINE 30 MG/ML
30 INJECTION, SOLUTION INTRAMUSCULAR; INTRAVENOUS ONCE
Status: COMPLETED | OUTPATIENT
Start: 2018-08-27 | End: 2018-08-27

## 2018-08-27 RX ORDER — ONDANSETRON 2 MG/ML
4 INJECTION INTRAMUSCULAR; INTRAVENOUS ONCE
Status: COMPLETED | OUTPATIENT
Start: 2018-08-27 | End: 2018-08-27

## 2018-08-27 RX ADMIN — KETOROLAC TROMETHAMINE 30 MG: 30 INJECTION, SOLUTION INTRAMUSCULAR; INTRAVENOUS at 23:10

## 2018-08-27 RX ADMIN — SODIUM CHLORIDE 1000 ML: 9 INJECTION, SOLUTION INTRAVENOUS at 23:09

## 2018-08-27 RX ADMIN — ONDANSETRON 4 MG: 2 SOLUTION INTRAMUSCULAR; INTRAVENOUS at 23:10

## 2018-08-28 ENCOUNTER — OFFICE VISIT (OUTPATIENT)
Dept: FAMILY MEDICINE CLINIC | Facility: CLINIC | Age: 38
End: 2018-08-28

## 2018-08-28 VITALS
SYSTOLIC BLOOD PRESSURE: 152 MMHG | TEMPERATURE: 97.5 F | WEIGHT: 315 LBS | HEART RATE: 89 BPM | BODY MASS INDEX: 44.1 KG/M2 | OXYGEN SATURATION: 97 % | DIASTOLIC BLOOD PRESSURE: 80 MMHG | HEIGHT: 71 IN

## 2018-08-28 VITALS
HEART RATE: 90 BPM | BODY MASS INDEX: 44.1 KG/M2 | DIASTOLIC BLOOD PRESSURE: 89 MMHG | HEIGHT: 71 IN | TEMPERATURE: 98.7 F | RESPIRATION RATE: 20 BRPM | WEIGHT: 315 LBS | OXYGEN SATURATION: 98 % | SYSTOLIC BLOOD PRESSURE: 175 MMHG

## 2018-08-28 DIAGNOSIS — E11.65 TYPE 2 DIABETES MELLITUS WITH HYPERGLYCEMIA, WITHOUT LONG-TERM CURRENT USE OF INSULIN (HCC): ICD-10-CM

## 2018-08-28 DIAGNOSIS — E66.01 MORBID OBESITY (HCC): Primary | ICD-10-CM

## 2018-08-28 LAB — NT-PROBNP SERPL-MCNC: 32.8 PG/ML (ref 0–450)

## 2018-08-28 PROCEDURE — 99214 OFFICE O/P EST MOD 30 MIN: CPT | Performed by: NURSE PRACTITIONER

## 2018-08-31 ENCOUNTER — RESULTS ENCOUNTER (OUTPATIENT)
Dept: BARIATRICS/WEIGHT MGMT | Facility: CLINIC | Age: 38
End: 2018-08-31

## 2018-08-31 DIAGNOSIS — E66.01 MORBID OBESITY WITH BMI OF 70 AND OVER, ADULT (HCC): ICD-10-CM

## 2018-08-31 DIAGNOSIS — E11.69 DIABETES MELLITUS TYPE 2 IN OBESE (HCC): ICD-10-CM

## 2018-08-31 DIAGNOSIS — E66.9 DIABETES MELLITUS TYPE 2 IN OBESE (HCC): ICD-10-CM

## 2018-08-31 DIAGNOSIS — I10 UNCONTROLLED HYPERTENSION: ICD-10-CM

## 2018-08-31 DIAGNOSIS — E78.2 MIXED HYPERLIPIDEMIA: ICD-10-CM

## 2018-08-31 DIAGNOSIS — K21.9 GASTROESOPHAGEAL REFLUX DISEASE, ESOPHAGITIS PRESENCE NOT SPECIFIED: ICD-10-CM

## 2018-09-04 ENCOUNTER — HOSPITAL ENCOUNTER (EMERGENCY)
Facility: HOSPITAL | Age: 38
Discharge: HOME | End: 2018-09-05
Attending: EMERGENCY MEDICINE | Admitting: EMERGENCY MEDICINE

## 2018-09-04 ENCOUNTER — APPOINTMENT (OUTPATIENT)
Dept: CT IMAGING | Facility: HOSPITAL | Age: 38
End: 2018-09-04

## 2018-09-04 DIAGNOSIS — L02.91 ABSCESS: Primary | ICD-10-CM

## 2018-09-04 DIAGNOSIS — L03.90 CELLULITIS, UNSPECIFIED CELLULITIS SITE: ICD-10-CM

## 2018-09-04 LAB
ALBUMIN SERPL-MCNC: 3.5 G/DL (ref 3.4–4.8)
ALBUMIN/GLOB SERPL: 0.9 G/DL (ref 1.1–1.8)
ALP SERPL-CCNC: 93 U/L (ref 38–126)
ALT SERPL W P-5'-P-CCNC: 30 U/L (ref 21–72)
ANION GAP SERPL CALCULATED.3IONS-SCNC: 8 MMOL/L (ref 5–15)
AST SERPL-CCNC: 31 U/L (ref 17–59)
BASOPHILS # BLD AUTO: 0.02 10*3/MM3 (ref 0–0.2)
BASOPHILS NFR BLD AUTO: 0.3 % (ref 0–2)
BILIRUB SERPL-MCNC: 0.4 MG/DL (ref 0.2–1.3)
BUN BLD-MCNC: 10 MG/DL (ref 7–21)
BUN/CREAT SERPL: 12.2 (ref 7–25)
CALCIUM SPEC-SCNC: 8.9 MG/DL (ref 8.4–10.2)
CHLORIDE SERPL-SCNC: 98 MMOL/L (ref 95–110)
CO2 SERPL-SCNC: 29 MMOL/L (ref 22–31)
CREAT BLD-MCNC: 0.82 MG/DL (ref 0.7–1.3)
D-LACTATE SERPL-SCNC: 1.4 MMOL/L (ref 0.5–2)
DEPRECATED RDW RBC AUTO: 42 FL (ref 35.1–43.9)
EOSINOPHIL # BLD AUTO: 0.34 10*3/MM3 (ref 0–0.7)
EOSINOPHIL NFR BLD AUTO: 4.4 % (ref 0–7)
ERYTHROCYTE [DISTWIDTH] IN BLOOD BY AUTOMATED COUNT: 14 % (ref 11.5–14.5)
GFR SERPL CREATININE-BSD FRML MDRD: 105 ML/MIN/1.73 (ref 70–162)
GLOBULIN UR ELPH-MCNC: 3.7 GM/DL (ref 2.3–3.5)
GLUCOSE BLD-MCNC: 304 MG/DL (ref 60–100)
HCT VFR BLD AUTO: 37.9 % (ref 39–49)
HGB BLD-MCNC: 13 G/DL (ref 13.7–17.3)
HOLD SPECIMEN: NORMAL
IMM GRANULOCYTES # BLD: 0.11 10*3/MM3 (ref 0–0.02)
IMM GRANULOCYTES NFR BLD: 1.4 % (ref 0–0.5)
LYMPHOCYTES # BLD AUTO: 1.61 10*3/MM3 (ref 0.6–4.2)
LYMPHOCYTES NFR BLD AUTO: 21 % (ref 10–50)
MCH RBC QN AUTO: 28.3 PG (ref 26.5–34)
MCHC RBC AUTO-ENTMCNC: 34.3 G/DL (ref 31.5–36.3)
MCV RBC AUTO: 82.4 FL (ref 80–98)
MONOCYTES # BLD AUTO: 0.72 10*3/MM3 (ref 0–0.9)
MONOCYTES NFR BLD AUTO: 9.4 % (ref 0–12)
NEUTROPHILS # BLD AUTO: 4.87 10*3/MM3 (ref 2–8.6)
NEUTROPHILS NFR BLD AUTO: 63.5 % (ref 37–80)
PLATELET # BLD AUTO: 177 10*3/MM3 (ref 150–450)
PMV BLD AUTO: 9.2 FL (ref 8–12)
POTASSIUM BLD-SCNC: 4.1 MMOL/L (ref 3.5–5.1)
PROT SERPL-MCNC: 7.2 G/DL (ref 6.3–8.6)
RBC # BLD AUTO: 4.6 10*6/MM3 (ref 4.37–5.74)
SODIUM BLD-SCNC: 135 MMOL/L (ref 137–145)
WBC NRBC COR # BLD: 7.67 10*3/MM3 (ref 3.2–9.8)
WHOLE BLOOD HOLD SPECIMEN: NORMAL

## 2018-09-04 PROCEDURE — 87070 CULTURE OTHR SPECIMN AEROBIC: CPT | Performed by: EMERGENCY MEDICINE

## 2018-09-04 PROCEDURE — 85025 COMPLETE CBC W/AUTO DIFF WBC: CPT | Performed by: EMERGENCY MEDICINE

## 2018-09-04 PROCEDURE — 74177 CT ABD & PELVIS W/CONTRAST: CPT

## 2018-09-04 PROCEDURE — 25010000002 IOPAMIDOL 61 % SOLUTION: Performed by: EMERGENCY MEDICINE

## 2018-09-04 PROCEDURE — 80053 COMPREHEN METABOLIC PANEL: CPT | Performed by: EMERGENCY MEDICINE

## 2018-09-04 PROCEDURE — 87205 SMEAR GRAM STAIN: CPT | Performed by: EMERGENCY MEDICINE

## 2018-09-04 PROCEDURE — 83605 ASSAY OF LACTIC ACID: CPT | Performed by: EMERGENCY MEDICINE

## 2018-09-04 PROCEDURE — 87077 CULTURE AEROBIC IDENTIFY: CPT | Performed by: EMERGENCY MEDICINE

## 2018-09-04 PROCEDURE — 87040 BLOOD CULTURE FOR BACTERIA: CPT | Performed by: EMERGENCY MEDICINE

## 2018-09-04 PROCEDURE — 99283 EMERGENCY DEPT VISIT LOW MDM: CPT

## 2018-09-04 PROCEDURE — 87147 CULTURE TYPE IMMUNOLOGIC: CPT | Performed by: EMERGENCY MEDICINE

## 2018-09-04 PROCEDURE — 87186 SC STD MICRODIL/AGAR DIL: CPT | Performed by: EMERGENCY MEDICINE

## 2018-09-04 RX ORDER — SODIUM CHLORIDE 0.9 % (FLUSH) 0.9 %
10 SYRINGE (ML) INJECTION AS NEEDED
Status: DISCONTINUED | OUTPATIENT
Start: 2018-09-04 | End: 2018-09-05 | Stop reason: HOSPADM

## 2018-09-04 RX ADMIN — IOPAMIDOL 95 ML: 612 INJECTION, SOLUTION INTRAVENOUS at 23:53

## 2018-09-04 RX ADMIN — Medication 10 ML: at 23:02

## 2018-09-05 VITALS
RESPIRATION RATE: 20 BRPM | HEART RATE: 74 BPM | WEIGHT: 315 LBS | OXYGEN SATURATION: 96 % | HEIGHT: 71 IN | BODY MASS INDEX: 44.1 KG/M2 | SYSTOLIC BLOOD PRESSURE: 170 MMHG | DIASTOLIC BLOOD PRESSURE: 82 MMHG | TEMPERATURE: 99 F

## 2018-09-05 PROCEDURE — 96374 THER/PROPH/DIAG INJ IV PUSH: CPT

## 2018-09-05 RX ORDER — CLINDAMYCIN HYDROCHLORIDE 150 MG/1
450 CAPSULE ORAL 3 TIMES DAILY
Qty: 63 CAPSULE | Refills: 0 | Status: SHIPPED | OUTPATIENT
Start: 2018-09-05 | End: 2018-09-12

## 2018-09-05 RX ORDER — CLINDAMYCIN PHOSPHATE 600 MG/50ML
600 INJECTION INTRAVENOUS ONCE
Status: COMPLETED | OUTPATIENT
Start: 2018-09-05 | End: 2018-09-05

## 2018-09-05 RX ADMIN — CLINDAMYCIN IN 5 PERCENT DEXTROSE 600 MG: 12 INJECTION, SOLUTION INTRAVENOUS at 01:15

## 2018-09-05 NOTE — ED NOTES
Multiple abscesses noted to scrotum, penis, arms, and abdomen.      Idalia Marino RN  09/04/18 [id number]

## 2018-09-05 NOTE — ED PROVIDER NOTES
Subjective   37-year-old morbidly obese male well-known to this emergency department presents as directed from urgent care.  He was recently seen there for concern for abscessed areas and started on antibiotics.  He returns today because he felt that they were getting worse and they sent him here for further evaluation.  Patient states a remote history of MRSA infection.  He has been taking Bactrim and Ceftin ear.  He states there is an area to his lower abdomen on the right as well as an area on his penis, and scrotum.  He also had area under the left upper arm.  He states he's noticed some drainage from the area on his lower abdomen.  He states all the areas are very uncomfortable.  Patient is diabetic.    Family history, surgical history, social history, current medications and allergies are reviewed with the patient and triage documentation and vitals are reviewed.        History provided by:  Patient and medical records   used: No        Review of Systems   Constitutional: Negative for chills and fever.   HENT: Negative.    Eyes: Negative.    Respiratory: Negative.    Cardiovascular: Negative.    Gastrointestinal: Negative for abdominal distention, abdominal pain, nausea and vomiting.   Endocrine: Negative.    Genitourinary: Positive for genital sores, penile pain and testicular pain. Negative for dysuria, penile swelling and scrotal swelling.   Musculoskeletal: Negative for back pain and neck pain.   Skin: Positive for wound. Negative for rash.   Allergic/Immunologic: Negative.    Neurological: Negative.    Hematological: Negative.    Psychiatric/Behavioral: Negative.        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HCC)    • Fatty liver    • GERD (gastroesophageal reflux disease)    • Hyperlipidemia    • Hypertension    • Morbid  obesity (CMS/HCC)    • Pulmonary embolism (CMS/HCC)    • RLS (restless legs syndrome)    • Seizures (CMS/HCC)     last one many years ago   • Sleep apnea     c-pap       Allergies   Allergen Reactions   • Glipizide Other (See Comments)     Slurred Speech   • Reglan [Metoclopramide] Anxiety   • Tramadol Other (See Comments)     seizures   • Risperdal [Risperidone] Other (See Comments)     Slurred speech       Past Surgical History:   Procedure Laterality Date   • ADENOIDECTOMY     • CARDIAC CATHETERIZATION N/A 3/15/2017    Procedure: Left Heart Cath;  Surgeon: Edwige Briceno MD;  Location: [facility] CATH INVASIVE LOCATION;  Service:    • CARDIAC CATHETERIZATION N/A 3/15/2017    Procedure: Stent SOPHIA coronary;  Surgeon: Edwige Briceno MD;  Location: [facility] CATH INVASIVE LOCATION;  Service:    • CARDIAC CATHETERIZATION N/A 3/1/2018    Procedure: Left Heart Cath;  Surgeon: Conor Parsons MD;  Location: [facility] CATH INVASIVE LOCATION;  Service:    • CHOLECYSTECTOMY      lap   • CORONARY ANGIOPLASTY WITH STENT PLACEMENT     • AZ RT/LT HEART CATHETERS N/A 3/15/2017    Procedure: Percutaneous Coronary Intervention;  Surgeon: Edwige Briceno MD;  Location: [facility] CATH INVASIVE LOCATION;  Service: Cardiovascular   • TONSILLECTOMY     • TRANSESOPHAGEAL ECHOCARDIOGRAM (MONICA)         Family History   Problem Relation Age of Onset   • Heart disease Mother    • Hypertension Mother    • Hyperlipidemia Mother    • Coronary artery disease Mother    • Diabetes Mother    • Obesity Mother    • Kidney failure Mother    • Sleep apnea Father    • Cancer Paternal Grandfather        Social History     Social History   • Marital status:      Spouse name: Cori   • Number of children: 0     Occupational History   • Disabled      Social History Main Topics   • Smoking status: Former Smoker     Packs/day: 0.25     Years: 0.50     Types: Cigarettes     Quit date: 1997   • Smokeless tobacco: Current User     Types: Snuff      Comment: quit  smoking 25 years ago; he does use snuff   • Alcohol use No   • Drug use: No   • Sexual activity: Yes     Partners: Female     Birth control/ protection: None     Other Topics Concern   • Not on file           Objective   Physical Exam   Constitutional: He is oriented to person, place, and time. He appears well-developed and well-nourished.   Morbidly obese   HENT:   Head: Normocephalic.   Eyes: Pupils are equal, round, and reactive to light.   Neck: Normal range of motion.   Cardiovascular: Normal rate and regular rhythm.    No murmur heard.  Pulmonary/Chest: Effort normal and breath sounds normal. No respiratory distress.   Abdominal: Soft. Bowel sounds are normal.   Genitourinary: Circumcised.         Musculoskeletal: Normal range of motion.   Neurological: He is alert and oriented to person, place, and time.   Skin: Skin is warm. Capillary refill takes less than 2 seconds.   Psychiatric: He has a normal mood and affect.   Nursing note and vitals reviewed.  GARY Eduardo present during testicular and genital exam    Procedures  None         ED Course      Labs Reviewed   COMPREHENSIVE METABOLIC PANEL - Abnormal; Notable for the following:        Result Value    Glucose 304 (*)     Sodium 135 (*)     Globulin 3.7 (*)     A/G Ratio 0.9 (*)     All other components within normal limits   CBC WITH AUTO DIFFERENTIAL - Abnormal; Notable for the following:     Hemoglobin 13.0 (*)     Hematocrit 37.9 (*)     Immature Grans % 1.4 (*)     Immature Grans, Absolute 0.11 (*)     All other components within normal limits   LACTIC ACID, PLASMA - Normal   WOUND CULTURE   BLOOD CULTURE   BLOOD CULTURE   CBC AND DIFFERENTIAL    Narrative:     The following orders were created for panel order CBC & Differential.  Procedure                               Abnormality         Status                     ---------                               -----------         ------                     CBC Auto Differential[[id number]]        Abnormal             Final result                 Please view results for these tests on the individual orders.   EXTRA TUBES    Narrative:     The following orders were created for panel order Extra Tubes.  Procedure                               Abnormality         Status                     ---------                               -----------         ------                     Light Blue Top[[id number]]                                   Final result               Gold Top - SST[[id number]]                                   Final result                 Please view results for these tests on the individual orders.   LIGHT BLUE TOP   GOLD TOP - SST     Xr Chest 2 View    Result Date: 8/27/2018  Narrative: Chest 2 view on  8/27/2018 CLINICAL INDICATION: Chest pain, shortness of breath COMPARISON: 5/30/2018 FINDINGS: The lungs are clear. Right-sided PICC line has been removed. Cardiac, hilar and mediastinal contours are within normal limits. Pulmonary vascularity is within normal limits. No bony abnormality is noted.     Impression: No active disease. Electronically signed by:  Pelon Anderson  8/27/2018 11:44 PM CDT Workstation: [id number]    Ct Abdomen Pelvis With Contrast    Result Date: 9/5/2018  Narrative: Exam: CT abdomen pelvis with contrast INDICATION: Abscess, groin pain, John gangrene TECHNIQUE: Routine CT abdomen pelvis with contrast. Sagittal coronal reconstructions were obtained. Exam is limited by patient's body habitus. This exam was performed according to our departmental dose-optimization program, which includes automated exposure control, adjustment of the mA and/or kV according to patient size and/or use of iterative reconstruction technique. COMPARISON: 1/26/2018 FINDINGS: The imaged portions of the lower lungs are clear. The liver, spleen, pancreas adrenal glands and kidneys are unremarkable. No urinary tract calculus or hydronephrosis. Status post cholecystectomy. The aorta is normal in caliber. No  significant adenopathy. No bowel obstruction or abnormal bowel wall thickening. Mild diverticulosis. No evidence of diverticulitis. Normal appendix. No abnormal stranding the mesentery or ascites Bladder is unremarkable. There are no enhancing fluid collection in the subcutaneous soft tissue or air in the subcutaneous soft tissues. Particularly there is no abnormality in the groin regions bilaterally.     Impression: 1. No obvious acute abnormality. Electronically signed by:  Brennan Corrigan MD  9/5/2018 12:22 AM CDT Workstation: [id number]          [facility]  Number of Diagnoses or Management Options  Abscess:   Cellulitis, unspecified cellulitis site:      Amount and/or Complexity of Data Reviewed  Clinical lab tests: reviewed  Tests in the radiology section of CPT®: reviewed    Patient Progress  Patient progress: stable    Patient is afebrile, no acute distress resting emergency department.  Vital signs are stable.  He has no criteria for seizures or sepsis.  Due to patient's diabetes area of these lesions CT of abdomen and pelvis is performed to further evaluate for possible diffuse infection, or John's.  CT imaging is unremarkable with no large areas of fluid collection.  On exam there are no areas amenable to drainage.  The one largest area underneath the right side of the pannus is spontaneously draining and wound culture is obtained.  Due to patient's history of MRSA concern for worsening symptoms patient will be transitioned to clindamycin.  He is given initial dose IV and a prescription is written for the treatment course.  He is advised to follow-up with primary care within the next 2-3 days for reevaluation to have the wounds evaluated.  He acknowledges understanding of treatment and plan and is agreeable to discharge with outpatient management and follow-up.    Final diagnoses:   Abscess   Cellulitis, unspecified cellulitis site            Gm Goncalves,   09/05/18 0552

## 2018-09-05 NOTE — DISCHARGE INSTRUCTIONS
Please return with new or worsening symptoms.  Discontinue use of current antibiotics and start taking clindamycin as prescribed today in the morning.  Follow-up for wound reevaluation with primary care in 2-3 days.

## 2018-09-07 RX ORDER — ISOSORBIDE MONONITRATE 120 MG/1
120 TABLET, EXTENDED RELEASE ORAL DAILY
Qty: 30 TABLET | Refills: 5 | Status: SHIPPED | OUTPATIENT
Start: 2018-09-07 | End: 2018-09-10 | Stop reason: SDUPTHER

## 2018-09-09 LAB
BACTERIA SPEC AEROBE CULT: ABNORMAL
BACTERIA SPEC AEROBE CULT: ABNORMAL
BACTERIA SPEC AEROBE CULT: NORMAL
BACTERIA SPEC AEROBE CULT: NORMAL
GRAM STN SPEC: ABNORMAL
GRAM STN SPEC: ABNORMAL

## 2018-09-10 RX ORDER — ISOSORBIDE MONONITRATE 120 MG/1
120 TABLET, EXTENDED RELEASE ORAL DAILY
Qty: 30 TABLET | Refills: 5 | Status: SHIPPED | OUTPATIENT
Start: 2018-09-10 | End: 2020-09-29 | Stop reason: SDUPTHER

## 2018-09-12 ENCOUNTER — TELEPHONE (OUTPATIENT)
Dept: EMERGENCY DEPT | Facility: HOSPITAL | Age: 38
End: 2018-09-12

## 2018-09-12 NOTE — PROGRESS NOTES
Subjective   Yordy Hansen is a 37 y.o. male who presents to the office for [facility] ER follow-up for HTN, abd pain and hyperglycemia.  Has had a recent LEFT foot wound that is healed.  Tells me he checks his blood sugar at least twice daily.  Is taking 50 units of Levemir daily.  A1C in May was 11.1.  Has had cardiac stents.  Uses cpap machine, goes to [facility] for supplies.  Has been seeing Froylan Keyes.    History of Present Illness     The following portions of the patient's history were reviewed and updated as appropriate: allergies, current medications, past family history, past medical history, past social history, past surgical history and problem list.    Review of Systems   Constitutional: Negative for chills, fatigue and fever.   HENT: Negative for congestion, sneezing, sore throat and trouble swallowing.    Eyes: Negative for visual disturbance.   Respiratory: Negative for cough, chest tightness, shortness of breath and wheezing.    Cardiovascular: Negative for chest pain, palpitations and leg swelling.   Gastrointestinal: Negative for abdominal pain, constipation, diarrhea, nausea and vomiting.   Genitourinary: Negative for dysuria, frequency and urgency.   Musculoskeletal: Negative for neck pain.   Skin: Negative for rash.   Neurological: Negative for dizziness, weakness and headaches.   Psychiatric/Behavioral:        In the past two weeks the pt has not felt down, depressed, hopeless or lost interest in doing things   All other systems reviewed and are negative.      Objective   Physical Exam   Constitutional: He is oriented to person, place, and time. He appears well-developed and well-nourished. He is cooperative. He appears ill (chronically).   Morbidly obese   HENT:   Head: Normocephalic and atraumatic.   Right Ear: External ear normal.   Left Ear: External ear normal.   Nose: Nose normal.   Mouth/Throat: Uvula is midline, oropharynx is clear and moist and mucous membranes are normal.    Eyes: Pupils are equal, round, and reactive to light. Conjunctivae, EOM and lids are normal. Right eye exhibits no discharge. Left eye exhibits no discharge. No scleral icterus.   Neck: Normal range of motion. Neck supple. No thyromegaly present.   Cardiovascular: Normal rate, regular rhythm, normal heart sounds and intact distal pulses.  Exam reveals no gallop and no friction rub.    No murmur heard.  Pulmonary/Chest: Effort normal and breath sounds normal. No respiratory distress. He has no wheezes. He has no rales.   Abdominal: Soft. Bowel sounds are normal. He exhibits no distension. There is no tenderness. There is no rebound and no guarding.   Musculoskeletal: Normal range of motion. He exhibits no edema.   Lymphadenopathy:     He has no cervical adenopathy.   Neurological: He is alert and oriented to person, place, and time. No cranial nerve deficit.   Skin: Skin is warm and dry. No rash noted.   Psychiatric: He has a normal mood and affect. His behavior is normal. Judgment and thought content normal.   Nursing note and vitals reviewed.      Assessment/Plan   Yordy was seen today for establish care.    Diagnoses and all orders for this visit:    Morbid obesity (CMS/[facility])  -     Ambulatory Referral to Bariatric Surgery    Type 2 diabetes mellitus with hyperglycemia, without long-term current use of insulin (CMS/[facility])  -     insulin detemir (LEVEMIR) 100 UNIT/ML injection; Inject 60 Units under the skin into the appropriate area as directed Every Night.  -     insulin aspart (novoLOG) 100 UNIT/ML injection; Inject 25 Units under the skin into the appropriate area as directed 3 (Three) Times a Day Before Meals.    HIGHLY ENCOURAGED to follow ADA heart healthy diet and precautions.  Have increased his insulin products.

## 2018-09-14 ENCOUNTER — TELEPHONE (OUTPATIENT)
Dept: EMERGENCY DEPT | Facility: HOSPITAL | Age: 38
End: 2018-09-14

## 2018-09-28 ENCOUNTER — OFFICE VISIT (OUTPATIENT)
Dept: FAMILY MEDICINE CLINIC | Facility: CLINIC | Age: 38
End: 2018-09-28

## 2018-09-28 ENCOUNTER — RESULTS ENCOUNTER (OUTPATIENT)
Dept: BARIATRICS/WEIGHT MGMT | Facility: CLINIC | Age: 38
End: 2018-09-28

## 2018-09-28 VITALS
HEIGHT: 71 IN | OXYGEN SATURATION: 98 % | DIASTOLIC BLOOD PRESSURE: 72 MMHG | BODY MASS INDEX: 44.1 KG/M2 | TEMPERATURE: 98.7 F | SYSTOLIC BLOOD PRESSURE: 140 MMHG | WEIGHT: 315 LBS | HEART RATE: 67 BPM

## 2018-09-28 DIAGNOSIS — I10 UNCONTROLLED HYPERTENSION: ICD-10-CM

## 2018-09-28 DIAGNOSIS — E78.2 MIXED HYPERLIPIDEMIA: ICD-10-CM

## 2018-09-28 DIAGNOSIS — E66.01 MORBID OBESITY WITH BMI OF 70 AND OVER, ADULT (HCC): ICD-10-CM

## 2018-09-28 DIAGNOSIS — R07.9 CHEST PAIN AT REST: Primary | ICD-10-CM

## 2018-09-28 DIAGNOSIS — K21.9 GASTROESOPHAGEAL REFLUX DISEASE, ESOPHAGITIS PRESENCE NOT SPECIFIED: ICD-10-CM

## 2018-09-28 DIAGNOSIS — E66.9 DIABETES MELLITUS TYPE 2 IN OBESE (HCC): ICD-10-CM

## 2018-09-28 DIAGNOSIS — E11.69 DIABETES MELLITUS TYPE 2 IN OBESE (HCC): ICD-10-CM

## 2018-09-28 PROCEDURE — 99214 OFFICE O/P EST MOD 30 MIN: CPT | Performed by: NURSE PRACTITIONER

## 2018-09-30 ENCOUNTER — HOSPITAL ENCOUNTER (OUTPATIENT)
Facility: HOSPITAL | Age: 38
Setting detail: OBSERVATION
Discharge: HOME | End: 2018-10-01
Attending: EMERGENCY MEDICINE | Admitting: EMERGENCY MEDICINE

## 2018-09-30 ENCOUNTER — APPOINTMENT (OUTPATIENT)
Dept: GENERAL RADIOLOGY | Facility: HOSPITAL | Age: 38
End: 2018-09-30

## 2018-09-30 DIAGNOSIS — R07.9 CHEST PAIN, RULE OUT ACUTE MYOCARDIAL INFARCTION: Primary | ICD-10-CM

## 2018-09-30 LAB
ALBUMIN SERPL-MCNC: 3.7 G/DL (ref 3.4–4.8)
ALBUMIN/GLOB SERPL: 1 G/DL (ref 1.1–1.8)
ALP SERPL-CCNC: 85 U/L (ref 38–126)
ALT SERPL W P-5'-P-CCNC: 33 U/L (ref 21–72)
ANION GAP SERPL CALCULATED.3IONS-SCNC: 9 MMOL/L (ref 5–15)
AST SERPL-CCNC: 27 U/L (ref 17–59)
BACTERIA UR QL AUTO: ABNORMAL /HPF
BASOPHILS # BLD AUTO: 0.01 10*3/MM3 (ref 0–0.2)
BASOPHILS NFR BLD AUTO: 0.1 % (ref 0–2)
BILIRUB SERPL-MCNC: 0.2 MG/DL (ref 0.2–1.3)
BILIRUB UR QL STRIP: NEGATIVE
BUN BLD-MCNC: 14 MG/DL (ref 7–21)
BUN/CREAT SERPL: 25 (ref 7–25)
CALCIUM SPEC-SCNC: 8.9 MG/DL (ref 8.4–10.2)
CHLORIDE SERPL-SCNC: 97 MMOL/L (ref 95–110)
CLARITY UR: CLEAR
CO2 SERPL-SCNC: 27 MMOL/L (ref 22–31)
COLOR UR: YELLOW
CREAT BLD-MCNC: 0.56 MG/DL (ref 0.7–1.3)
DEPRECATED RDW RBC AUTO: 41.8 FL (ref 35.1–43.9)
EOSINOPHIL # BLD AUTO: 0.15 10*3/MM3 (ref 0–0.7)
EOSINOPHIL NFR BLD AUTO: 1.6 % (ref 0–7)
ERYTHROCYTE [DISTWIDTH] IN BLOOD BY AUTOMATED COUNT: 13.9 % (ref 11.5–14.5)
GFR SERPL CREATININE-BSD FRML MDRD: 162 ML/MIN/1.73 (ref 63–147)
GLOBULIN UR ELPH-MCNC: 3.6 GM/DL (ref 2.3–3.5)
GLUCOSE BLD-MCNC: 312 MG/DL (ref 60–100)
GLUCOSE UR STRIP-MCNC: ABNORMAL MG/DL
HCT VFR BLD AUTO: 36.1 % (ref 39–49)
HGB BLD-MCNC: 12.5 G/DL (ref 13.7–17.3)
HGB UR QL STRIP.AUTO: ABNORMAL
HYALINE CASTS UR QL AUTO: ABNORMAL /LPF
IMM GRANULOCYTES # BLD: 0.09 10*3/MM3 (ref 0–0.02)
IMM GRANULOCYTES NFR BLD: 1 % (ref 0–0.5)
KETONES UR QL STRIP: NEGATIVE
LEUKOCYTE ESTERASE UR QL STRIP.AUTO: NEGATIVE
LYMPHOCYTES # BLD AUTO: 1.62 10*3/MM3 (ref 0.6–4.2)
LYMPHOCYTES NFR BLD AUTO: 17.7 % (ref 10–50)
MCH RBC QN AUTO: 28.7 PG (ref 26.5–34)
MCHC RBC AUTO-ENTMCNC: 34.6 G/DL (ref 31.5–36.3)
MCV RBC AUTO: 82.8 FL (ref 80–98)
MONOCYTES # BLD AUTO: 0.85 10*3/MM3 (ref 0–0.9)
MONOCYTES NFR BLD AUTO: 9.3 % (ref 0–12)
NEUTROPHILS # BLD AUTO: 6.42 10*3/MM3 (ref 2–8.6)
NEUTROPHILS NFR BLD AUTO: 70.3 % (ref 37–80)
NITRITE UR QL STRIP: NEGATIVE
NT-PROBNP SERPL-MCNC: 145 PG/ML (ref 0–450)
PH UR STRIP.AUTO: 6 [PH] (ref 5–9)
PLATELET # BLD AUTO: 162 10*3/MM3 (ref 150–450)
PMV BLD AUTO: 9.1 FL (ref 8–12)
POTASSIUM BLD-SCNC: 4 MMOL/L (ref 3.5–5.1)
PROT SERPL-MCNC: 7.3 G/DL (ref 6.3–8.6)
PROT UR QL STRIP: NEGATIVE
RBC # BLD AUTO: 4.36 10*6/MM3 (ref 4.37–5.74)
RBC # UR: ABNORMAL /HPF
REF LAB TEST METHOD: ABNORMAL
SODIUM BLD-SCNC: 133 MMOL/L (ref 137–145)
SP GR UR STRIP: 1.02 (ref 1–1.03)
SQUAMOUS #/AREA URNS HPF: ABNORMAL /HPF
TROPONIN I SERPL-MCNC: <0.012 NG/ML
TROPONIN I SERPL-MCNC: <0.012 NG/ML
UROBILINOGEN UR QL STRIP: ABNORMAL
WBC NRBC COR # BLD: 9.14 10*3/MM3 (ref 3.2–9.8)
WBC UR QL AUTO: ABNORMAL /HPF

## 2018-09-30 PROCEDURE — 80053 COMPREHEN METABOLIC PANEL: CPT | Performed by: EMERGENCY MEDICINE

## 2018-09-30 PROCEDURE — 96375 TX/PRO/DX INJ NEW DRUG ADDON: CPT

## 2018-09-30 PROCEDURE — G0378 HOSPITAL OBSERVATION PER HR: HCPCS

## 2018-09-30 PROCEDURE — 25010000002 MORPHINE PER 10 MG: Performed by: FAMILY MEDICINE

## 2018-09-30 PROCEDURE — 25010000002 ONDANSETRON PER 1 MG: Performed by: EMERGENCY MEDICINE

## 2018-09-30 PROCEDURE — 96376 TX/PRO/DX INJ SAME DRUG ADON: CPT

## 2018-09-30 PROCEDURE — 93005 ELECTROCARDIOGRAM TRACING: CPT | Performed by: EMERGENCY MEDICINE

## 2018-09-30 PROCEDURE — 93010 ELECTROCARDIOGRAM REPORT: CPT | Performed by: INTERNAL MEDICINE

## 2018-09-30 PROCEDURE — 63710000001 INSULIN ASPART PER 5 UNITS: Performed by: FAMILY MEDICINE

## 2018-09-30 PROCEDURE — 93005 ELECTROCARDIOGRAM TRACING: CPT | Performed by: FAMILY MEDICINE

## 2018-09-30 PROCEDURE — 71045 X-RAY EXAM CHEST 1 VIEW: CPT

## 2018-09-30 PROCEDURE — 96374 THER/PROPH/DIAG INJ IV PUSH: CPT

## 2018-09-30 PROCEDURE — 63710000001 INSULIN DETEMIR PER 5 UNITS: Performed by: FAMILY MEDICINE

## 2018-09-30 PROCEDURE — 94760 N-INVAS EAR/PLS OXIMETRY 1: CPT

## 2018-09-30 PROCEDURE — 83880 ASSAY OF NATRIURETIC PEPTIDE: CPT | Performed by: EMERGENCY MEDICINE

## 2018-09-30 PROCEDURE — 84484 ASSAY OF TROPONIN QUANT: CPT | Performed by: FAMILY MEDICINE

## 2018-09-30 PROCEDURE — 85025 COMPLETE CBC W/AUTO DIFF WBC: CPT | Performed by: EMERGENCY MEDICINE

## 2018-09-30 PROCEDURE — 81001 URINALYSIS AUTO W/SCOPE: CPT | Performed by: EMERGENCY MEDICINE

## 2018-09-30 PROCEDURE — 99285 EMERGENCY DEPT VISIT HI MDM: CPT

## 2018-09-30 PROCEDURE — 84484 ASSAY OF TROPONIN QUANT: CPT | Performed by: EMERGENCY MEDICINE

## 2018-09-30 PROCEDURE — 94640 AIRWAY INHALATION TREATMENT: CPT

## 2018-09-30 PROCEDURE — 25010000002 MORPHINE PER 10 MG: Performed by: EMERGENCY MEDICINE

## 2018-09-30 RX ORDER — ASPIRIN 325 MG
325 TABLET ORAL ONCE
Status: COMPLETED | OUTPATIENT
Start: 2018-09-30 | End: 2018-09-30

## 2018-09-30 RX ORDER — PRAMIPEXOLE DIHYDROCHLORIDE 1 MG/1
1 TABLET ORAL EVERY 12 HOURS SCHEDULED
Status: DISCONTINUED | OUTPATIENT
Start: 2018-09-30 | End: 2018-10-01 | Stop reason: HOSPADM

## 2018-09-30 RX ORDER — CLOPIDOGREL BISULFATE 75 MG/1
75 TABLET ORAL DAILY
Status: DISCONTINUED | OUTPATIENT
Start: 2018-09-30 | End: 2018-10-01 | Stop reason: HOSPADM

## 2018-09-30 RX ORDER — ALBUTEROL SULFATE 90 UG/1
2 AEROSOL, METERED RESPIRATORY (INHALATION) EVERY 4 HOURS PRN
Status: DISCONTINUED | OUTPATIENT
Start: 2018-09-30 | End: 2018-10-01 | Stop reason: HOSPADM

## 2018-09-30 RX ORDER — PANTOPRAZOLE SODIUM 20 MG/1
40 TABLET, DELAYED RELEASE ORAL NIGHTLY
Status: DISCONTINUED | OUTPATIENT
Start: 2018-09-30 | End: 2018-09-30 | Stop reason: DRUGHIGH

## 2018-09-30 RX ORDER — NALOXONE HCL 0.4 MG/ML
0.4 VIAL (ML) INJECTION
Status: DISCONTINUED | OUTPATIENT
Start: 2018-09-30 | End: 2018-10-01 | Stop reason: HOSPADM

## 2018-09-30 RX ORDER — TRAZODONE HYDROCHLORIDE 150 MG/1
300 TABLET ORAL NIGHTLY
Status: DISCONTINUED | OUTPATIENT
Start: 2018-09-30 | End: 2018-10-01 | Stop reason: HOSPADM

## 2018-09-30 RX ORDER — RANOLAZINE 500 MG/1
1000 TABLET, EXTENDED RELEASE ORAL EVERY 12 HOURS SCHEDULED
Status: DISCONTINUED | OUTPATIENT
Start: 2018-09-30 | End: 2018-10-01 | Stop reason: HOSPADM

## 2018-09-30 RX ORDER — ALBUTEROL SULFATE 2.5 MG/3ML
1.25 SOLUTION RESPIRATORY (INHALATION) EVERY 6 HOURS PRN
Status: DISCONTINUED | OUTPATIENT
Start: 2018-09-30 | End: 2018-10-01 | Stop reason: HOSPADM

## 2018-09-30 RX ORDER — AMLODIPINE BESYLATE 10 MG/1
10 TABLET ORAL NIGHTLY
Status: DISCONTINUED | OUTPATIENT
Start: 2018-09-30 | End: 2018-10-01 | Stop reason: HOSPADM

## 2018-09-30 RX ORDER — METOPROLOL SUCCINATE 100 MG/1
200 TABLET, EXTENDED RELEASE ORAL NIGHTLY
Status: DISCONTINUED | OUTPATIENT
Start: 2018-09-30 | End: 2018-10-01 | Stop reason: HOSPADM

## 2018-09-30 RX ORDER — NITROGLYCERIN 0.4 MG/1
0.4 TABLET SUBLINGUAL
Status: DISCONTINUED | OUTPATIENT
Start: 2018-09-30 | End: 2018-10-01 | Stop reason: HOSPADM

## 2018-09-30 RX ORDER — ONDANSETRON 2 MG/ML
4 INJECTION INTRAMUSCULAR; INTRAVENOUS ONCE
Status: COMPLETED | OUTPATIENT
Start: 2018-09-30 | End: 2018-09-30

## 2018-09-30 RX ORDER — SODIUM CHLORIDE 0.9 % (FLUSH) 0.9 %
1-10 SYRINGE (ML) INJECTION AS NEEDED
Status: DISCONTINUED | OUTPATIENT
Start: 2018-09-30 | End: 2018-10-01 | Stop reason: HOSPADM

## 2018-09-30 RX ORDER — SERTRALINE HYDROCHLORIDE 25 MG/1
25 TABLET, FILM COATED ORAL DAILY
Status: DISCONTINUED | OUTPATIENT
Start: 2018-09-30 | End: 2018-10-01 | Stop reason: HOSPADM

## 2018-09-30 RX ORDER — PANTOPRAZOLE SODIUM 40 MG/1
40 TABLET, DELAYED RELEASE ORAL NIGHTLY
Status: DISCONTINUED | OUTPATIENT
Start: 2018-09-30 | End: 2018-10-01 | Stop reason: HOSPADM

## 2018-09-30 RX ORDER — LISINOPRIL 40 MG/1
40 TABLET ORAL EVERY MORNING
Status: DISCONTINUED | OUTPATIENT
Start: 2018-10-01 | End: 2018-10-01 | Stop reason: HOSPADM

## 2018-09-30 RX ORDER — ISOSORBIDE MONONITRATE 60 MG/1
120 TABLET, EXTENDED RELEASE ORAL DAILY
Status: DISCONTINUED | OUTPATIENT
Start: 2018-09-30 | End: 2018-10-01 | Stop reason: HOSPADM

## 2018-09-30 RX ORDER — IPRATROPIUM BROMIDE AND ALBUTEROL SULFATE 2.5; .5 MG/3ML; MG/3ML
3 SOLUTION RESPIRATORY (INHALATION) ONCE
Status: COMPLETED | OUTPATIENT
Start: 2018-09-30 | End: 2018-09-30

## 2018-09-30 RX ADMIN — RANOLAZINE 1000 MG: 500 TABLET, FILM COATED, EXTENDED RELEASE ORAL at 20:37

## 2018-09-30 RX ADMIN — PANTOPRAZOLE SODIUM 40 MG: 40 TABLET, DELAYED RELEASE ORAL at 20:37

## 2018-09-30 RX ADMIN — PRAMIPEXOLE DIHYDROCHLORIDE 1 MG: 1 TABLET ORAL at 20:38

## 2018-09-30 RX ADMIN — INSULIN ASPART 25 UNITS: 100 INJECTION, SOLUTION INTRAVENOUS; SUBCUTANEOUS at 19:21

## 2018-09-30 RX ADMIN — ONDANSETRON HYDROCHLORIDE 4 MG: 2 INJECTION INTRAMUSCULAR; INTRAVENOUS at 15:46

## 2018-09-30 RX ADMIN — CLOPIDOGREL BISULFATE 75 MG: 75 TABLET ORAL at 19:23

## 2018-09-30 RX ADMIN — MORPHINE SULFATE 1 MG: 4 INJECTION INTRAVENOUS at 22:13

## 2018-09-30 RX ADMIN — METOPROLOL SUCCINATE 200 MG: 100 TABLET, EXTENDED RELEASE ORAL at 20:38

## 2018-09-30 RX ADMIN — MORPHINE SULFATE 8 MG: 4 INJECTION, SOLUTION INTRAMUSCULAR; INTRAVENOUS at 15:47

## 2018-09-30 RX ADMIN — NITROGLYCERIN 1 INCH: 20 OINTMENT TOPICAL at 16:52

## 2018-09-30 RX ADMIN — SERTRALINE HYDROCHLORIDE 25 MG: 25 TABLET ORAL at 19:23

## 2018-09-30 RX ADMIN — ASPIRIN 325 MG: 325 TABLET ORAL at 15:46

## 2018-09-30 RX ADMIN — ISOSORBIDE MONONITRATE 120 MG: 60 TABLET, EXTENDED RELEASE ORAL at 19:23

## 2018-09-30 RX ADMIN — INSULIN DETEMIR 60 UNITS: 100 INJECTION, SOLUTION SUBCUTANEOUS at 20:40

## 2018-09-30 RX ADMIN — RIVAROXABAN 20 MG: 10 TABLET, FILM COATED ORAL at 19:22

## 2018-09-30 RX ADMIN — TRAZODONE HYDROCHLORIDE 300 MG: 150 TABLET ORAL at 20:38

## 2018-09-30 RX ADMIN — AMLODIPINE BESYLATE 10 MG: 10 TABLET ORAL at 20:38

## 2018-09-30 RX ADMIN — IPRATROPIUM BROMIDE AND ALBUTEROL SULFATE 3 ML: 2.5; .5 SOLUTION RESPIRATORY (INHALATION) at 15:58

## 2018-10-01 VITALS
RESPIRATION RATE: 18 BRPM | WEIGHT: 315 LBS | BODY MASS INDEX: 44.1 KG/M2 | HEART RATE: 61 BPM | TEMPERATURE: 98.9 F | HEIGHT: 71 IN | DIASTOLIC BLOOD PRESSURE: 64 MMHG | SYSTOLIC BLOOD PRESSURE: 135 MMHG | OXYGEN SATURATION: 97 %

## 2018-10-01 LAB
BASOPHILS # BLD AUTO: 0.02 10*3/MM3 (ref 0–0.2)
BASOPHILS NFR BLD AUTO: 0.3 % (ref 0–2)
DEPRECATED RDW RBC AUTO: 42.5 FL (ref 35.1–43.9)
EOSINOPHIL # BLD AUTO: 0.31 10*3/MM3 (ref 0–0.7)
EOSINOPHIL NFR BLD AUTO: 4.3 % (ref 0–7)
ERYTHROCYTE [DISTWIDTH] IN BLOOD BY AUTOMATED COUNT: 14.1 % (ref 11.5–14.5)
HCT VFR BLD AUTO: 33.4 % (ref 39–49)
HGB BLD-MCNC: 11.4 G/DL (ref 13.7–17.3)
IMM GRANULOCYTES # BLD: 0.07 10*3/MM3 (ref 0–0.02)
IMM GRANULOCYTES NFR BLD: 1 % (ref 0–0.5)
LYMPHOCYTES # BLD AUTO: 1.9 10*3/MM3 (ref 0.6–4.2)
LYMPHOCYTES NFR BLD AUTO: 26.3 % (ref 10–50)
MCH RBC QN AUTO: 28.4 PG (ref 26.5–34)
MCHC RBC AUTO-ENTMCNC: 34.1 G/DL (ref 31.5–36.3)
MCV RBC AUTO: 83.1 FL (ref 80–98)
MONOCYTES # BLD AUTO: 0.66 10*3/MM3 (ref 0–0.9)
MONOCYTES NFR BLD AUTO: 9.1 % (ref 0–12)
NEUTROPHILS # BLD AUTO: 4.27 10*3/MM3 (ref 2–8.6)
NEUTROPHILS NFR BLD AUTO: 59 % (ref 37–80)
PLATELET # BLD AUTO: 136 10*3/MM3 (ref 150–450)
PMV BLD AUTO: 9.2 FL (ref 8–12)
RBC # BLD AUTO: 4.02 10*6/MM3 (ref 4.37–5.74)
TROPONIN I SERPL-MCNC: <0.012 NG/ML
WBC NRBC COR # BLD: 7.23 10*3/MM3 (ref 3.2–9.8)

## 2018-10-01 PROCEDURE — 84484 ASSAY OF TROPONIN QUANT: CPT | Performed by: FAMILY MEDICINE

## 2018-10-01 PROCEDURE — 85025 COMPLETE CBC W/AUTO DIFF WBC: CPT | Performed by: FAMILY MEDICINE

## 2018-10-01 PROCEDURE — 94799 UNLISTED PULMONARY SVC/PX: CPT

## 2018-10-01 PROCEDURE — 94760 N-INVAS EAR/PLS OXIMETRY 1: CPT

## 2018-10-01 PROCEDURE — G0378 HOSPITAL OBSERVATION PER HR: HCPCS

## 2018-10-01 PROCEDURE — 36415 COLL VENOUS BLD VENIPUNCTURE: CPT | Performed by: FAMILY MEDICINE

## 2018-10-01 PROCEDURE — 63710000001 INSULIN ASPART PER 5 UNITS: Performed by: FAMILY MEDICINE

## 2018-10-01 RX ORDER — CLOPIDOGREL BISULFATE 75 MG/1
75 TABLET ORAL DAILY
Qty: 30 TABLET | Refills: 0 | Status: SHIPPED | OUTPATIENT
Start: 2018-10-01 | End: 2018-12-07

## 2018-10-01 RX ADMIN — LISINOPRIL 40 MG: 40 TABLET ORAL at 07:26

## 2018-10-01 RX ADMIN — SERTRALINE HYDROCHLORIDE 25 MG: 25 TABLET ORAL at 08:11

## 2018-10-01 RX ADMIN — ALBUTEROL SULFATE 1.25 MG: 2.5 SOLUTION RESPIRATORY (INHALATION) at 00:12

## 2018-10-01 RX ADMIN — INSULIN ASPART 25 UNITS: 100 INJECTION, SOLUTION INTRAVENOUS; SUBCUTANEOUS at 08:11

## 2018-10-01 RX ADMIN — ISOSORBIDE MONONITRATE 120 MG: 60 TABLET, EXTENDED RELEASE ORAL at 08:11

## 2018-10-01 RX ADMIN — CLOPIDOGREL BISULFATE 75 MG: 75 TABLET ORAL at 08:11

## 2018-10-01 RX ADMIN — PRAMIPEXOLE DIHYDROCHLORIDE 1 MG: 1 TABLET ORAL at 08:11

## 2018-10-01 RX ADMIN — RANOLAZINE 1000 MG: 500 TABLET, FILM COATED, EXTENDED RELEASE ORAL at 08:11

## 2018-10-01 NOTE — DISCHARGE SUMMARY
DISCHARGE SUMMARY    NAME: Yordy Hansen   PHYSICIAN: Yared Mcnulty MD  : 1980  MRN: [id number]    ADMITTED: 2018   DISCHARGED:  10/01/18      ADMISSION DIAGNOSES:   Present on Admission:  • Chest pain, rule out acute myocardial infarction  • Shortness of breath  • Morbid obesity (CMS/HCC)  • Mixed hyperlipidemia  • Hypertension  • GERD (gastroesophageal reflux disease)  • Diabetes mellitus (CMS/HCC)  • Factor II deficiency (CMS/HCC)  • Chronic pulmonary embolism (CMS/HCC)  • CAD (coronary artery disease)    DISCHARGE DIAGNOSES:   Principal Problem:    Chest pain, rule out acute myocardial infarction  Active Problems:    Mixed hyperlipidemia    ADOLFO on CPAP    GERD (gastroesophageal reflux disease)    Hypertension    Chronic pulmonary embolism (CMS/HCC)    Factor II deficiency (CMS/HCC)    Shortness of breath    Status post coronary artery stent placement    Morbid obesity (CMS/HCC)    Diabetes mellitus (CMS/HCC)    CAD (coronary artery disease)      SERVICE: Medicine. Attending  Yared Mcnulty MD    CONSULTS:   Consult Orders (all)     None          PROCEDURES:   Imaging Results (last 7 days)     Procedure Component Value Units Date/Time    XR Chest 1 View [[id number]] Collected:  18 1527     Updated:  18 1551    Narrative:         EXAM:         Radiograph(s), Chest   VIEWS:   Frontal  ; 1       DATE/TIME:  2018 3:41 PM CDT                INDICATION:   CHEST PAIN , SOB    COMPARISON:  CXR: 18             FINDINGS:             - lines/tubes:    none     - cardiac:         size within normal limits         - mediastinum: contour within normal limits         - lungs:         no focal air space process, pulmonary  interstitial edema, nodule(s)/mass             - pleura:         no evidence of  fluid                  - osseous:         unremarkable for age                  - misc.:         Impression:       CONCLUSION:        1. No evidence of an active cardiopulmonary process.                                                               Electronically signed by:  MIGUEL A Soni MD  9/30/2018 3:50  PM CDT Workstation: [id number]          HISTORY OF PRESENT ILLNESS: *Copied from Gonsalo Hogue MD's H&P*  Patient is 38 y.o. male presents with past medical history of coronary artery disease, obstructive sleep apnea, morbid obesity, hyperlipidemia, hypertension, GERD, factor II deficiency, that is mellitus, chronic pulmonary embolism, chronic chest pain, presented to the ED with a complaint of having chest pain.  Patient state that he went and saw his cardiologist in [place], where he was evaluated and was recommended to stop Plavix and start Effient.  Patient has stopped his Plavix for past 1 month however his insurance has not approved it for Effient.  Study that had a chest pain which was retrosternal, 7 out of 10, pressure-like in nature.  Worse with exertion.     DIAGNOSTIC DATA:   Lab Results (last 7 days)     Procedure Component Value Units Date/Time    Troponin [[id number]]  (Normal) Collected:  10/01/18 0537    Specimen:  Blood Updated:  10/01/18 0613     Troponin I <0.012 ng/mL     CBC Auto Differential [[id number]]  (Abnormal) Collected:  10/01/18 0537    Specimen:  Blood Updated:  10/01/18 0548     WBC 7.23 10*3/mm3      RBC 4.02 (L) 10*6/mm3      Hemoglobin 11.4 (L) g/dL      Hematocrit 33.4 (L) %      MCV 83.1 fL      MCH 28.4 pg      MCHC 34.1 g/dL      RDW 14.1 %      RDW-SD 42.5 fl      MPV 9.2 fL      Platelets 136 (L) 10*3/mm3      Neutrophil % 59.0 %      Lymphocyte % 26.3 %      Monocyte % 9.1 %      Eosinophil % 4.3 %      Basophil % 0.3 %      Immature Grans % 1.0 (H) %      Neutrophils, Absolute 4.27 10*3/mm3      Lymphocytes, Absolute 1.90 10*3/mm3      Monocytes, Absolute 0.66 10*3/mm3      Eosinophils, Absolute 0.31 10*3/mm3      Basophils, Absolute 0.02 10*3/mm3      Immature Grans, Absolute 0.07 (H) 10*3/mm3     Troponin [[id number]]  (Normal) Collected:  09/30/18 2124     Specimen:  Blood Updated:  09/30/18 2152     Troponin I <0.012 ng/mL     BNP [[id number]]  (Normal) Collected:  09/30/18 1606    Specimen:  Blood Updated:  09/30/18 1642     proBNP 145.0 pg/mL     Troponin [[id number]]  (Normal) Collected:  09/30/18 1606    Specimen:  Blood Updated:  09/30/18 1640     Troponin I <0.012 ng/mL     Urinalysis, Microscopic Only - Urine, Clean Catch [[id number]]  (Abnormal) Collected:  09/30/18 1619    Specimen:  Urine from Urine, Clean Catch Updated:  09/30/18 1636     RBC, UA 0-2 (A) /HPF      WBC, UA None Seen /HPF      Bacteria, UA None Seen /HPF      Squamous Epithelial Cells, UA None Seen /HPF      Hyaline Casts, UA None Seen /LPF      Methodology Automated Microscopy    Comprehensive Metabolic Panel [[id number]]  (Abnormal) Collected:  09/30/18 1606    Specimen:  Blood Updated:  09/30/18 1629     Glucose 312 (H) mg/dL      BUN 14 mg/dL      Creatinine 0.56 (L) mg/dL      Sodium 133 (L) mmol/L      Potassium 4.0 mmol/L      Chloride 97 mmol/L      CO2 27.0 mmol/L      Calcium 8.9 mg/dL      Total Protein 7.3 g/dL      Albumin 3.70 g/dL      ALT (SGPT) 33 U/L      AST (SGOT) 27 U/L      Alkaline Phosphatase 85 U/L      Total Bilirubin 0.2 mg/dL      eGFR Non African Amer 162 (H) mL/min/1.73      Globulin 3.6 (H) gm/dL      A/G Ratio 1.0 (L) g/dL      BUN/Creatinine Ratio 25.0     Anion Gap 9.0 mmol/L     Urinalysis With Microscopic If Indicated (No Culture) - Urine, Clean Catch [[id number]]  (Abnormal) Collected:  09/30/18 1619    Specimen:  Urine from Urine, Clean Catch Updated:  09/30/18 1623     Color, UA Yellow     Appearance, UA Clear     pH, UA 6.0     Specific Gravity, UA 1.025     Glucose, UA >=1000 mg/dL (3+) (A)     Ketones, UA Negative     Bilirubin, UA Negative     Blood, UA Small (1+) (A)     Protein, UA Negative     Leuk Esterase, UA Negative     Nitrite, UA Negative     Urobilinogen, UA 0.2 E.U./dL    CBC & Differential [[id number]] Collected:  09/30/18 3226    Specimen:   Blood Updated:  09/30/18 1618    Narrative:       The following orders were created for panel order CBC & Differential.  Procedure                               Abnormality         Status                     ---------                               -----------         ------                     CBC Auto Differential[[id number]]        Abnormal            Final result                 Please view results for these tests on the individual orders.    CBC Auto Differential [[id number]]  (Abnormal) Collected:  09/30/18 1606    Specimen:  Blood Updated:  09/30/18 1618     WBC 9.14 10*3/mm3      RBC 4.36 (L) 10*6/mm3      Hemoglobin 12.5 (L) g/dL      Hematocrit 36.1 (L) %      MCV 82.8 fL      MCH 28.7 pg      MCHC 34.6 g/dL      RDW 13.9 %      RDW-SD 41.8 fl      MPV 9.1 fL      Platelets 162 10*3/mm3      Neutrophil % 70.3 %      Lymphocyte % 17.7 %      Monocyte % 9.3 %      Eosinophil % 1.6 %      Basophil % 0.1 %      Immature Grans % 1.0 (H) %      Neutrophils, Absolute 6.42 10*3/mm3      Lymphocytes, Absolute 1.62 10*3/mm3      Monocytes, Absolute 0.85 10*3/mm3      Eosinophils, Absolute 0.15 10*3/mm3      Basophils, Absolute 0.01 10*3/mm3      Immature Grans, Absolute 0.09 (H) 10*3/mm3           HOSPITAL COURSE:  Active Hospital Problems    Diagnosis Date Noted   • **Chest pain, rule out acute myocardial infarction [R07.9] 09/30/2018   • Diabetes mellitus (CMS/[facility]) [E11.9] 03/30/2018   • CAD (coronary artery disease) [I25.10] 03/30/2018   • Status post coronary artery stent placement [Z95.5] 03/08/2018   • Morbid obesity (CMS/[facility]) [E66.01] 03/08/2018   • Shortness of breath [R06.02] 12/20/2017   • Factor II deficiency (CMS/[facility]) [D68.2] 05/30/2017   • Chronic pulmonary embolism (CMS/[facility]) [I27.82] 04/22/2017   • Hypertension [I10]    • GERD (gastroesophageal reflux disease) [K21.9]    • Mixed hyperlipidemia [E78.2] 12/13/2016   • ADOLFO on CPAP [G47.33, Z99.89] 02/17/2016     Patient admitted with chest pain that is  "recurrent. Patient had negative troponin x3 (6 hours apart). He is chest pain free. Per patient he was started on Effient a month ago, but I can not find this in his record. He will be restarted on his plavix at discharge and decision on anti platelet therapy change to come from patient and cardiologist. He is currently chest pain free and after review of his enzymes and his EKG patient to be discharged home with close cardiology follow up (within 48-72 hours). Patient's recent stress test and LHC reviewed. Patient certainly has risk factors and modification of those risk factors again discussed with patient.  Patient chronically on Xarelto for PE and Factor II point mutation.       Results from last 7 days  Lab Units 10/01/18  0537 09/30/18  2126 09/30/18  1606   TROPONIN I ng/mL <0.012 <0.012 <0.012         PHYSICAL EXAM ON DISCHARGE:  /77 (BP Location: Left arm, Patient Position: Sitting)   Pulse 62   Temp 97.3 °F (36.3 °C) (Temporal Artery )   Resp 20   Ht 180.3 cm (71\")   Wt (!) 215 kg (473 lb 6.4 oz)   SpO2 98%   BMI 66.03 kg/m²      Physical Exam   Constitutional: He is oriented to person, place, and time. No distress.   Morbidly obese.   HENT:   Head: Normocephalic and atraumatic.   Right Ear: External ear normal.   Left Ear: External ear normal.   Nose: Nose normal.   Eyes: Conjunctivae are normal. Right eye exhibits no discharge. Left eye exhibits no discharge.   Neck: Normal range of motion. Neck supple.   Cardiovascular: Normal rate, regular rhythm, normal heart sounds and intact distal pulses.    Pulmonary/Chest: Effort normal and breath sounds normal. No respiratory distress. He has no wheezes. He has no rales. He exhibits no tenderness.   Abdominal: Soft. Bowel sounds are normal. He exhibits no distension and no mass. There is no tenderness. There is no rebound and no guarding.   Musculoskeletal: Normal range of motion. He exhibits no edema.   Neurological: He is alert and oriented to " person, place, and time.   Skin: Skin is warm and dry. No rash noted. He is not diaphoretic. No erythema. No pallor.   Psychiatric: He has a normal mood and affect. His behavior is normal.   Nursing note and vitals reviewed.      CONDITION ON DISCHARGE:   Stable    Review of Systems   Constitutional: Positive for activity change and fatigue. Negative for appetite change and fever.   HENT: Negative for ear pain and sore throat.    Eyes: Negative for pain and visual disturbance.   Respiratory: Positive for shortness of breath (Chronic). Negative for cough.    Cardiovascular: Positive for chest pain (Resolved). Negative for palpitations.   Gastrointestinal: Negative for abdominal pain and nausea.   Endocrine: Negative for cold intolerance and heat intolerance.   Genitourinary: Negative for difficulty urinating and dysuria.   Musculoskeletal: Positive for arthralgias and back pain. Negative for gait problem.   Skin: Negative for color change and rash.   Neurological: Negative for dizziness, weakness and headaches.   Hematological: Negative for adenopathy. Does not bruise/bleed easily.   Psychiatric/Behavioral: Negative for agitation, confusion and sleep disturbance.       DISPOSITION:  Home or Self Care    DISCHARGE MEDICATIONS     Discharge Medications      Changes to Medications      Instructions Start Date   clopidogrel 75 MG tablet  Commonly known as:  PLAVIX  What changed:  · when to take this  · additional instructions   75 mg, Oral, Daily      insulin aspart 100 UNIT/ML injection  Commonly known as:  novoLOG  What changed:  how much to take   25 Units, Subcutaneous, 3 Times Daily Before Meals      isosorbide mononitrate 120 MG 24 hr tablet  Commonly known as:  IMDUR  What changed:  when to take this   120 mg, Oral, Daily      lisinopril 40 MG tablet  Commonly known as:  MAGENZESTRIL  What changed:  when to take this   40 mg, Oral, Every Morning      metoprolol succinate  MG 24 hr tablet  Commonly known  "as:  TOPROL XL  What changed:  when to take this   200 mg, Oral, Daily      rivaroxaban 20 MG tablet  Commonly known as:  XARELTO  What changed:  when to take this   20 mg, Oral, Daily      sertraline 25 MG tablet  Commonly known as:  ZOLOFT  What changed:  when to take this   25 mg, Oral, Daily         Continue These Medications      Instructions Start Date   albuterol 108 (90 Base) MCG/ACT inhaler  Commonly known as:  PROVENTIL HFA;VENTOLIN HFA   2 puffs, Inhalation, Every 4 Hours PRN      albuterol 1.25 MG/3ML nebulizer solution  Commonly known as:  ACCUNEB   1 ampule, Nebulization, Every 6 Hours PRN      amLODIPine 10 MG tablet  Commonly known as:  NORVASC   10 mg, Oral, Nightly      glucose monitor monitoring kit   1 each, Does not apply, As Needed      insulin detemir 100 UNIT/ML injection  Commonly known as:  LEVEMIR   60 Units, Subcutaneous, Nightly      MICROLET LANCETS misc   One touch delica lancets      nitroglycerin 0.4 MG SL tablet  Commonly known as:  NITROSTAT   0.4 mg, Sublingual, Every 5 Minutes PRN, Take no more than 3 doses in 15 minutes.      pantoprazole 40 MG EC tablet  Commonly known as:  PROTONIX   40 mg, Oral, Nightly      pramipexole 1 MG tablet  Commonly known as:  MIRAPEX   TAKE TWO TABLETS BY MOUTH EVERY NIGHT      ranolazine 1000 MG 12 hr tablet  Commonly known as:  RANEXA   1,000 mg, Oral, Every 12 Hours Scheduled      RELION INSULIN SYRINGE 31G X 15/64\" 0.5 ML misc  Generic drug:  Insulin Syringe-Needle U-100   No dose, route, or frequency recorded.      traZODone 300 MG tablet  Commonly known as:  DESYREL   TAKE ONE TABLET BY MOUTH EVERY NIGHT             INSTRUCTIONS:  Activity:   Activity Instructions     Activity as Tolerated           Diet:   Diet Instructions     Diet: Consistent Carbohydrate, Cardiac       Discharge Diet:   Consistent Carbohydrate  Cardiac             Special instructions: Patient instructed to call MD or return to ED with worsening shortness of breath, chest " pain, fever greater than 100.4 degrees F or any other medical concerns..    FOLLOW UP:   Follow-up Information     Suki Sharma APRN. Go on 10/5/2018.    Specialty:  Family Medicine  Why:  FRIDAY OCTOBER 5TH 9:00 HOSPITAL FOLLOW UP  Contact information:  [address] [place] [address] [address] [place] [address] [phone]             Antony Jenkins MD PhD. Go on 10/10/2018.    Specialties:  Cardiology, Interventional Radiology  Why:  WEDNESDAY OCTOBER 10TH 2:45 HOSPITAL FOLLOW UP  Contact information:  [address] [place] [address] [address] [place] [address] [phone]                   PENDING TEST RESULTS AT DISCHARGE      Time: Discharge 30 min          This document has been electronically signed by Yared Mcnulty MD on October 1, 2018 10:24 AM

## 2018-10-01 NOTE — CONSULTS
Adult Nutrition  Assessment    Patient Name:  Yordy Hansne  YOB: 1980  MRN: [id number]  Admit Date:  9/30/2018    Assessment Date:  10/1/2018    Comments:  BMI 66 with pt at 307% IBW.  Making Lifestyle Choices for a Healthier Weight used to provide ed regarding Wt Loss Diet and diet copy given.  Pt discharged.          Reason for Assessment     Row Name 10/01/18 1416          Reason for Assessment    Reason For Assessment per organizational policy   Wt Loss Diet ed     Diagnosis other (see comments)   dx Morbid Obesity     Identified At Risk by Screening Criteria BMI;need for education                 Anthropometrics     Row Name 10/01/18 0727          Anthropometrics    Weight (!)  215 kg (473 lb 6.4 oz)                 Estimated/Assessed Needs     Row Name 10/01/18 1417          Calculation Measurements    Weight Used For Calculations 110 kg (243 lb 2.7 oz)        Estimated/Assessed Needs    Additional Documentation Calorie Requirements (Group);Fluid Requirements (Group);[place]-St. Jeor Equation (Group);Protein Requirements (Group)        Calorie Requirements    Estimated Calorie Requirement (kcal/day) 2645     Estimated Calorie Need Method [place]-St Jeor        KCAL/KG    14 Kcal/Kg (kcal) 1544.2     15 Kcal/Kg (kcal) 1654.5     18 Kcal/Kg (kcal) 1985.4     20 Kcal/Kg (kcal) 2206     25 Kcal/Kg (kcal) 2757.5     30 Kcal/Kg (kcal) 3309     35 Kcal/Kg (kcal) 3860.5     40 Kcal/Kg (kcal) 4412     45 Kcal/Kg (kcal) 4963.5     50 Kcal/Kg (kcal) 5515        [place]-St. Jeor Equation    RMR ([place]-St. Jeor Equation) 2035.13        Protein Requirements    Est Protein Requirement Amount (gms/kg) 1.2 gm protein     Estimated Protein Requirements (gms/day) 132.36        Fluid Requirements    Estimated Fluid Requirements (mL/day) 3309     RDA Method (mL) 3309     Amelia-Bharti Method (over 20 kg) 3706             Nutrition Prescription Ordered     Row Name 10/01/18 1419          Nutrition  Prescription PO    Current PO Diet Regular     Common Modifiers Cardiac             Evaluation of Received Nutrient/Fluid Intake     Row Name 10/01/18 1419 10/01/18 1417       Calculation Measurements    Weight Used For Calculations  -- 110 kg (243 lb 2.7 oz)       PO Evaluation    Number of Meals 1  --    % PO Intake 100  --            Evaluation of Prescribed Nutrient/Fluid Intake     Row Name 10/01/18 1417          Calculation Measurements    Weight Used For Calculations 110 kg (243 lb 2.7 oz)           Electronically signed by:  Ananya Oglesby RD  10/01/18 2:20 PM

## 2018-10-01 NOTE — PLAN OF CARE
Problem: Patient Care Overview  Goal: Plan of Care Review  Outcome: Ongoing (interventions implemented as appropriate)      Problem: Cardiac: ACS (Acute Coronary Syndrome) (Adult)  Goal: Signs and Symptoms of Listed Potential Problems Will be Absent, Minimized or Managed (Cardiac: ACS)  Outcome: Ongoing (interventions implemented as appropriate)

## 2018-10-02 ENCOUNTER — OFFICE VISIT (OUTPATIENT)
Dept: FAMILY MEDICINE CLINIC | Facility: CLINIC | Age: 38
End: 2018-10-02

## 2018-10-02 VITALS
HEIGHT: 71 IN | HEART RATE: 89 BPM | WEIGHT: 315 LBS | DIASTOLIC BLOOD PRESSURE: 80 MMHG | TEMPERATURE: 97.7 F | OXYGEN SATURATION: 98 % | BODY MASS INDEX: 44.1 KG/M2 | SYSTOLIC BLOOD PRESSURE: 146 MMHG

## 2018-10-02 DIAGNOSIS — R07.9 CHEST PAIN AT REST: Primary | ICD-10-CM

## 2018-10-02 PROCEDURE — 99213 OFFICE O/P EST LOW 20 MIN: CPT | Performed by: NURSE PRACTITIONER

## 2018-10-14 PROBLEM — R07.9 CHEST PAIN AT REST: Status: ACTIVE | Noted: 2018-10-14

## 2018-10-15 NOTE — PROGRESS NOTES
Subjective   Yordy Hansen is a 38 y.o. male who presents to the office for MDV follow-up, was admitted for chest pain at rest.  This report has been reviewed and will be scanned under today's visit.  The last time he checked his fingerstick was 273.  Sees his bariatric surgeon in [place] on monthly basis.    History of Present Illness     The following portions of the patient's history were reviewed and updated as appropriate: allergies, current medications, past family history, past medical history, past social history, past surgical history and problem list.    Review of Systems   Constitutional: Negative for chills, fatigue and fever.   HENT: Negative for congestion, sneezing, sore throat and trouble swallowing.    Eyes: Negative for visual disturbance.   Respiratory: Negative for cough, chest tightness, shortness of breath and wheezing.    Cardiovascular: Positive for chest pain. Negative for palpitations and leg swelling.   Gastrointestinal: Negative for abdominal pain, constipation, diarrhea, nausea and vomiting.   Genitourinary: Negative for dysuria, frequency and urgency.   Musculoskeletal: Negative for neck pain.   Skin: Negative for rash.   Neurological: Negative for dizziness, weakness and headaches.   Psychiatric/Behavioral:        In the past two weeks the pt has not felt down, depressed, hopeless or lost interest in doing things   All other systems reviewed and are negative.      Objective   Physical Exam   Constitutional: He is oriented to person, place, and time. He appears well-developed and well-nourished. He is cooperative. He appears ill (chronically).   HENT:   Head: Normocephalic and atraumatic.   Right Ear: External ear normal.   Left Ear: External ear normal.   Nose: Nose normal.   Mouth/Throat: Oropharynx is clear and moist.   Eyes: Pupils are equal, round, and reactive to light. Conjunctivae and EOM are normal. Right eye exhibits no discharge. Left eye exhibits no discharge. No  scleral icterus.   Neck: Normal range of motion. Neck supple. No thyromegaly present.   Cardiovascular: Normal rate, regular rhythm, normal heart sounds and intact distal pulses.  Exam reveals no gallop and no friction rub.    No murmur heard.  Pulmonary/Chest: Effort normal and breath sounds normal. No respiratory distress. He has no wheezes. He has no rales.   Unable to reproduce chest pain   Abdominal: Soft. Bowel sounds are normal. He exhibits no distension. There is no tenderness. There is no rebound and no guarding.   Musculoskeletal: Normal range of motion. He exhibits no edema.   Lymphadenopathy:     He has no cervical adenopathy.   Neurological: He is alert and oriented to person, place, and time. No cranial nerve deficit. GCS eye subscore is 4. GCS verbal subscore is 5. GCS motor subscore is 6.   Skin: Skin is warm, dry and intact. Capillary refill takes less than 2 seconds. No rash noted.   Psychiatric: He has a normal mood and affect. His behavior is normal. Judgment and thought content normal.   Nursing note and vitals reviewed.      Assessment/Plan   There are no diagnoses linked to this encounter.  Yordy was seen today for follow-up.    Diagnoses and all orders for this visit:    Chest pain at rest    Continue with meds as ordered.  Follow bariatric surgeon's orders.  Continue with cardiac diet.

## 2018-10-17 NOTE — PROGRESS NOTES
Subjective   Yordy Hansen is a 38 y.o. male who presents to the office for [facility] followup, tells me he went to the hospital for chest pain and was discharged with no advice.  Hasn't had Plavix in a month, hospital was trying to get Effient approved.  Blood sugar was 256 one hour ago.    History of Present Illness     The following portions of the patient's history were reviewed and updated as appropriate: allergies, current medications, past family history, past medical history, past social history, past surgical history and problem list.    Review of Systems   Constitutional: Negative for chills, fatigue and fever.   HENT: Negative for congestion, sneezing, sore throat and trouble swallowing.    Eyes: Negative for visual disturbance.   Respiratory: Negative for cough, chest tightness, shortness of breath and wheezing.    Cardiovascular: Positive for chest pain. Negative for palpitations and leg swelling.   Gastrointestinal: Negative for abdominal pain, constipation, diarrhea, nausea and vomiting.   Genitourinary: Negative for dysuria, frequency and urgency.   Musculoskeletal: Negative for neck pain.   Skin: Negative for rash.   Neurological: Negative for dizziness, weakness and headaches.   Psychiatric/Behavioral:        In the past two weeks the pt has not felt down, depressed, hopeless or lost interest in doing things   All other systems reviewed and are negative.      Objective   Physical Exam   Constitutional: He is oriented to person, place, and time. He appears well-developed and well-nourished. He is cooperative. He appears ill (chronically).   Morbidly obese   HENT:   Head: Normocephalic and atraumatic.   Right Ear: External ear normal.   Left Ear: External ear normal.   Nose: Nose normal.   Mouth/Throat: Oropharynx is clear and moist.   Eyes: Pupils are equal, round, and reactive to light. Conjunctivae and EOM are normal. Right eye exhibits no discharge. Left eye exhibits no discharge. No scleral  icterus.   Neck: Normal range of motion. Neck supple. No thyromegaly present.   Cardiovascular: Normal rate, regular rhythm, normal heart sounds and intact distal pulses.  Exam reveals no gallop and no friction rub.    No murmur heard.  Pulmonary/Chest: Effort normal and breath sounds normal. No respiratory distress. He has no wheezes. He has no rales. He exhibits tenderness.   Abdominal: Soft. Bowel sounds are normal. He exhibits no distension. There is no tenderness. There is no rebound and no guarding.   Musculoskeletal: Normal range of motion. He exhibits no edema.   Lymphadenopathy:     He has no cervical adenopathy.   Neurological: He is alert and oriented to person, place, and time. No cranial nerve deficit.   Skin: Skin is warm and dry. No rash noted.   Psychiatric: He has a normal mood and affect. His behavior is normal. Judgment and thought content normal.   Nursing note and vitals reviewed.      Assessment/Plan   Yordy was seen today for pain.    Diagnoses and all orders for this visit:    Chest pain at rest  -     Ambulatory Referral to Cardiology      Encouraged meds as ordered.  Instructed him to get back on Plavix until he hears about the Effient, should not go without a blood thinner.

## 2018-10-26 ENCOUNTER — RESULTS ENCOUNTER (OUTPATIENT)
Dept: BARIATRICS/WEIGHT MGMT | Facility: CLINIC | Age: 38
End: 2018-10-26

## 2018-10-26 DIAGNOSIS — E66.01 MORBID OBESITY WITH BMI OF 70 AND OVER, ADULT (HCC): ICD-10-CM

## 2018-10-26 DIAGNOSIS — K21.9 GASTROESOPHAGEAL REFLUX DISEASE, ESOPHAGITIS PRESENCE NOT SPECIFIED: ICD-10-CM

## 2018-10-26 DIAGNOSIS — E66.9 DIABETES MELLITUS TYPE 2 IN OBESE (HCC): ICD-10-CM

## 2018-10-26 DIAGNOSIS — I10 UNCONTROLLED HYPERTENSION: ICD-10-CM

## 2018-10-26 DIAGNOSIS — E11.69 DIABETES MELLITUS TYPE 2 IN OBESE (HCC): ICD-10-CM

## 2018-10-26 DIAGNOSIS — E78.2 MIXED HYPERLIPIDEMIA: ICD-10-CM

## 2018-11-03 ENCOUNTER — HOSPITAL ENCOUNTER (EMERGENCY)
Facility: HOSPITAL | Age: 38
Discharge: HOME | End: 2018-11-03
Admitting: EMERGENCY MEDICINE

## 2018-11-03 ENCOUNTER — APPOINTMENT (OUTPATIENT)
Dept: GENERAL RADIOLOGY | Facility: HOSPITAL | Age: 38
End: 2018-11-03

## 2018-11-03 VITALS
HEIGHT: 71 IN | SYSTOLIC BLOOD PRESSURE: 129 MMHG | TEMPERATURE: 98.2 F | DIASTOLIC BLOOD PRESSURE: 76 MMHG | WEIGHT: 315 LBS | OXYGEN SATURATION: 93 % | RESPIRATION RATE: 20 BRPM | BODY MASS INDEX: 44.1 KG/M2 | HEART RATE: 90 BPM

## 2018-11-03 DIAGNOSIS — J40 BRONCHITIS: Primary | ICD-10-CM

## 2018-11-03 LAB
ALBUMIN SERPL-MCNC: 3.4 G/DL (ref 3.4–4.8)
ALBUMIN/GLOB SERPL: 0.9 G/DL (ref 1.1–1.8)
ALP SERPL-CCNC: 91 U/L (ref 38–126)
ALT SERPL W P-5'-P-CCNC: 36 U/L (ref 21–72)
ANION GAP SERPL CALCULATED.3IONS-SCNC: 9 MMOL/L (ref 5–15)
AST SERPL-CCNC: 46 U/L (ref 17–59)
BASOPHILS # BLD AUTO: 0.01 10*3/MM3 (ref 0–0.2)
BASOPHILS NFR BLD AUTO: 0.1 % (ref 0–2)
BILIRUB SERPL-MCNC: 0.5 MG/DL (ref 0.2–1.3)
BUN BLD-MCNC: 9 MG/DL (ref 7–21)
BUN/CREAT SERPL: 14.3 (ref 7–25)
CALCIUM SPEC-SCNC: 8.9 MG/DL (ref 8.4–10.2)
CHLORIDE SERPL-SCNC: 98 MMOL/L (ref 95–110)
CK MB SERPL-CCNC: 1.88 NG/ML (ref 0–5)
CK SERPL-CCNC: 163 U/L (ref 55–170)
CO2 SERPL-SCNC: 27 MMOL/L (ref 22–31)
CREAT BLD-MCNC: 0.63 MG/DL (ref 0.7–1.3)
DEPRECATED RDW RBC AUTO: 42.3 FL (ref 35.1–43.9)
EOSINOPHIL # BLD AUTO: 0.21 10*3/MM3 (ref 0–0.7)
EOSINOPHIL NFR BLD AUTO: 2.3 % (ref 0–7)
ERYTHROCYTE [DISTWIDTH] IN BLOOD BY AUTOMATED COUNT: 14.2 % (ref 11.5–14.5)
GFR SERPL CREATININE-BSD FRML MDRD: 141 ML/MIN/1.73 (ref 63–147)
GLOBULIN UR ELPH-MCNC: 3.6 GM/DL (ref 2.3–3.5)
GLUCOSE BLD-MCNC: 394 MG/DL (ref 60–100)
HCT VFR BLD AUTO: 38.6 % (ref 39–49)
HGB BLD-MCNC: 13 G/DL (ref 13.7–17.3)
HOLD SPECIMEN: NORMAL
HOLD SPECIMEN: NORMAL
IMM GRANULOCYTES # BLD: 0.12 10*3/MM3 (ref 0–0.02)
IMM GRANULOCYTES NFR BLD: 1.3 % (ref 0–0.5)
LYMPHOCYTES # BLD AUTO: 1.1 10*3/MM3 (ref 0.6–4.2)
LYMPHOCYTES NFR BLD AUTO: 12.1 % (ref 10–50)
MCH RBC QN AUTO: 28 PG (ref 26.5–34)
MCHC RBC AUTO-ENTMCNC: 33.7 G/DL (ref 31.5–36.3)
MCV RBC AUTO: 83 FL (ref 80–98)
MONOCYTES # BLD AUTO: 0.7 10*3/MM3 (ref 0–0.9)
MONOCYTES NFR BLD AUTO: 7.7 % (ref 0–12)
NEUTROPHILS # BLD AUTO: 6.93 10*3/MM3 (ref 2–8.6)
NEUTROPHILS NFR BLD AUTO: 76.5 % (ref 37–80)
PLATELET # BLD AUTO: 174 10*3/MM3 (ref 150–450)
PMV BLD AUTO: 8.9 FL (ref 8–12)
POTASSIUM BLD-SCNC: 4.1 MMOL/L (ref 3.5–5.1)
PROT SERPL-MCNC: 7 G/DL (ref 6.3–8.6)
RBC # BLD AUTO: 4.65 10*6/MM3 (ref 4.37–5.74)
SODIUM BLD-SCNC: 134 MMOL/L (ref 137–145)
TROPONIN I SERPL-MCNC: <0.012 NG/ML
WBC NRBC COR # BLD: 9.07 10*3/MM3 (ref 3.2–9.8)
WHOLE BLOOD HOLD SPECIMEN: NORMAL
WHOLE BLOOD HOLD SPECIMEN: NORMAL

## 2018-11-03 PROCEDURE — 80053 COMPREHEN METABOLIC PANEL: CPT | Performed by: NURSE PRACTITIONER

## 2018-11-03 PROCEDURE — 96375 TX/PRO/DX INJ NEW DRUG ADDON: CPT

## 2018-11-03 PROCEDURE — 71046 X-RAY EXAM CHEST 2 VIEWS: CPT

## 2018-11-03 PROCEDURE — 84484 ASSAY OF TROPONIN QUANT: CPT | Performed by: NURSE PRACTITIONER

## 2018-11-03 PROCEDURE — 82550 ASSAY OF CK (CPK): CPT | Performed by: NURSE PRACTITIONER

## 2018-11-03 PROCEDURE — 96365 THER/PROPH/DIAG IV INF INIT: CPT

## 2018-11-03 PROCEDURE — 94799 UNLISTED PULMONARY SVC/PX: CPT

## 2018-11-03 PROCEDURE — 93010 ELECTROCARDIOGRAM REPORT: CPT | Performed by: INTERNAL MEDICINE

## 2018-11-03 PROCEDURE — 99284 EMERGENCY DEPT VISIT MOD MDM: CPT

## 2018-11-03 PROCEDURE — 94640 AIRWAY INHALATION TREATMENT: CPT

## 2018-11-03 PROCEDURE — 25010000002 METHYLPREDNISOLONE PER 125 MG: Performed by: NURSE PRACTITIONER

## 2018-11-03 PROCEDURE — 93005 ELECTROCARDIOGRAM TRACING: CPT | Performed by: EMERGENCY MEDICINE

## 2018-11-03 PROCEDURE — 85025 COMPLETE CBC W/AUTO DIFF WBC: CPT | Performed by: NURSE PRACTITIONER

## 2018-11-03 PROCEDURE — 25010000002 CEFTRIAXONE PER 250 MG: Performed by: NURSE PRACTITIONER

## 2018-11-03 PROCEDURE — 25010000002 DEXAMETHASONE PER 1 MG: Performed by: NURSE PRACTITIONER

## 2018-11-03 PROCEDURE — 82553 CREATINE MB FRACTION: CPT | Performed by: NURSE PRACTITIONER

## 2018-11-03 RX ORDER — SODIUM CHLORIDE 0.9 % (FLUSH) 0.9 %
10 SYRINGE (ML) INJECTION AS NEEDED
Status: DISCONTINUED | OUTPATIENT
Start: 2018-11-03 | End: 2018-11-03 | Stop reason: HOSPADM

## 2018-11-03 RX ORDER — IPRATROPIUM BROMIDE AND ALBUTEROL SULFATE 2.5; .5 MG/3ML; MG/3ML
3 SOLUTION RESPIRATORY (INHALATION) ONCE
Status: COMPLETED | OUTPATIENT
Start: 2018-11-03 | End: 2018-11-03

## 2018-11-03 RX ORDER — AZITHROMYCIN 250 MG/1
250 TABLET, FILM COATED ORAL DAILY
Qty: 6 TABLET | Refills: 0 | Status: SHIPPED | OUTPATIENT
Start: 2018-11-03 | End: 2018-12-04

## 2018-11-03 RX ORDER — DEXTROMETHORPHAN HYDROBROMIDE AND PROMETHAZINE HYDROCHLORIDE 15; 6.25 MG/5ML; MG/5ML
5 SYRUP ORAL 4 TIMES DAILY PRN
Qty: 118 ML | Refills: 0 | Status: SHIPPED | OUTPATIENT
Start: 2018-11-03 | End: 2018-11-10

## 2018-11-03 RX ORDER — METHYLPREDNISOLONE SODIUM SUCCINATE 125 MG/2ML
125 INJECTION, POWDER, LYOPHILIZED, FOR SOLUTION INTRAMUSCULAR; INTRAVENOUS ONCE
Status: COMPLETED | OUTPATIENT
Start: 2018-11-03 | End: 2018-11-03

## 2018-11-03 RX ORDER — DEXAMETHASONE SODIUM PHOSPHATE 4 MG/ML
2 INJECTION, SOLUTION INTRA-ARTICULAR; INTRALESIONAL; INTRAMUSCULAR; INTRAVENOUS; SOFT TISSUE ONCE
Status: COMPLETED | OUTPATIENT
Start: 2018-11-03 | End: 2018-11-03

## 2018-11-03 RX ORDER — PROMETHAZINE HYDROCHLORIDE AND PHENYLEPHRINE HYDROCHLORIDE 6.25; 5 MG/5ML; MG/5ML
5 SYRUP ORAL EVERY 4 HOURS PRN
Status: DISCONTINUED | OUTPATIENT
Start: 2018-11-03 | End: 2018-11-03 | Stop reason: HOSPADM

## 2018-11-03 RX ORDER — METHYLPREDNISOLONE 4 MG/1
TABLET ORAL
Qty: 21 TABLET | Refills: 0 | Status: SHIPPED | OUTPATIENT
Start: 2018-11-03 | End: 2018-12-04

## 2018-11-03 RX ADMIN — METHYLPREDNISOLONE SODIUM SUCCINATE 125 MG: 125 INJECTION, POWDER, FOR SOLUTION INTRAMUSCULAR; INTRAVENOUS at 18:32

## 2018-11-03 RX ADMIN — IPRATROPIUM BROMIDE AND ALBUTEROL SULFATE 3 ML: 2.5; .5 SOLUTION RESPIRATORY (INHALATION) at 19:39

## 2018-11-03 RX ADMIN — CEFTRIAXONE SODIUM 1 G: 1 INJECTION, POWDER, FOR SOLUTION INTRAMUSCULAR; INTRAVENOUS at 20:27

## 2018-11-03 RX ADMIN — DEXAMETHASONE SODIUM PHOSPHATE 2 MG: 4 INJECTION, SOLUTION INTRAMUSCULAR; INTRAVENOUS at 19:39

## 2018-11-03 RX ADMIN — IPRATROPIUM BROMIDE AND ALBUTEROL SULFATE 3 ML: 2.5; .5 SOLUTION RESPIRATORY (INHALATION) at 18:33

## 2018-11-04 NOTE — ED PROVIDER NOTES
Subjective   38-year-old obese male reports emergency department complaining of cough, congestion, shortness of breath and malaise.  Patient has seen in the emergency department numerous times.  Nontoxic in emergency department, respirations the normal limits, sats within normal limits.        History provided by:  Patient   used: No        Review of Systems   Constitutional: Negative for chills, fatigue and fever.   HENT: Positive for postnasal drip, rhinorrhea and sinus pressure.    Respiratory: Positive for cough and shortness of breath.    Cardiovascular: Negative for chest pain and palpitations.   Gastrointestinal: Negative for diarrhea, nausea and vomiting.   Genitourinary: Negative for flank pain.   Musculoskeletal: Negative for back pain.   Skin: Negative for rash.   Allergic/Immunologic: Negative for immunocompromised state.   Neurological: Negative for weakness.   Hematological: Negative for adenopathy.   Psychiatric/Behavioral: Negative for confusion.   All other systems reviewed and are negative.      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HCC)    • Fatty liver    • GERD (gastroesophageal reflux disease)    • Hyperlipidemia    • Hypertension    • Morbid obesity (CMS/HCC)    • Pulmonary embolism (CMS/HCC)    • RLS (restless legs syndrome)    • Seizures (CMS/HCC)     last one many years ago   • Sleep apnea     c-pap       Allergies   Allergen Reactions   • Glipizide Other (See Comments)     Slurred Speech   • Reglan [Metoclopramide] Anxiety   • Tramadol Other (See Comments)     seizures   • Risperdal [Risperidone] Other (See Comments)     Slurred speech       Past Surgical History:   Procedure Laterality Date   • ADENOIDECTOMY     • CARDIAC CATHETERIZATION N/A 3/15/2017    Procedure: Left Heart Cath;  Surgeon: Edwige Briceno  MD;  Location: [facility] CATH INVASIVE LOCATION;  Service:    • CARDIAC CATHETERIZATION N/A 3/15/2017    Procedure: Stent SOPHIA coronary;  Surgeon: Edwige Briceno MD;  Location: [facility] CATH INVASIVE LOCATION;  Service:    • CARDIAC CATHETERIZATION N/A 3/1/2018    Procedure: Left Heart Cath;  Surgeon: Conor Parsons MD;  Location: [facility] CATH INVASIVE LOCATION;  Service:    • CHOLECYSTECTOMY      lap   • CORONARY ANGIOPLASTY WITH STENT PLACEMENT     • OK RT/LT HEART CATHETERS N/A 3/15/2017    Procedure: Percutaneous Coronary Intervention;  Surgeon: Edwige Briceno MD;  Location: [facility] CATH INVASIVE LOCATION;  Service: Cardiovascular   • TONSILLECTOMY     • TRANSESOPHAGEAL ECHOCARDIOGRAM (MONICA)         Family History   Problem Relation Age of Onset   • Heart disease Mother    • Hypertension Mother    • Hyperlipidemia Mother    • Coronary artery disease Mother    • Diabetes Mother    • Obesity Mother    • Kidney failure Mother    • Sleep apnea Father    • Cancer Paternal Grandfather        Social History     Social History   • Marital status:      Spouse name: Cori   • Number of children: 0     Occupational History   • Disabled      Social History Main Topics   • Smoking status: Former Smoker     Packs/day: 0.25     Years: 0.50     Types: Cigarettes     Quit date: 1997   • Smokeless tobacco: Current User     Types: Snuff      Comment: quit smoking 25 years ago; he does use snuff   • Alcohol use No   • Drug use: No   • Sexual activity: Yes     Partners: Female     Birth control/ protection: None     Other Topics Concern   • Not on file           Objective   Physical Exam   Constitutional: He is oriented to person, place, and time. Vital signs are normal. He appears well-developed and well-nourished.   HENT:   Head: Normocephalic.   Nose: Nose normal.   Eyes: Pupils are equal, round, and reactive to light. Conjunctivae are normal.   Neck: Normal range of motion.   Cardiovascular: Normal rate, regular rhythm and  normal heart sounds.    Pulmonary/Chest: Effort normal. He has wheezes.   Abdominal: Soft.   Musculoskeletal: Normal range of motion.   Neurological: He is alert and oriented to person, place, and time. GCS eye subscore is 4. GCS verbal subscore is 5. GCS motor subscore is 6.   Skin: Skin is warm and dry.   Psychiatric: He has a normal mood and affect.   Nursing note and vitals reviewed.      Procedures           ED Course      ..  XR Chest 2 View   Final Result   CONCLUSION:   No Acute Disease   Minimal cardiomegaly      [id number]      Electronically signed by:  Kulwant Currie MD  11/3/2018 5:13 PM CDT   Workstation: [email or web address]  Results for orders placed or performed during the hospital encounter of 11/03/18   Comprehensive Metabolic Panel   Result Value Ref Range    Glucose 394 (H) 60 - 100 mg/dL    BUN 9 7 - 21 mg/dL    Creatinine 0.63 (L) 0.70 - 1.30 mg/dL    Sodium 134 (L) 137 - 145 mmol/L    Potassium 4.1 3.5 - 5.1 mmol/L    Chloride 98 95 - 110 mmol/L    CO2 27.0 22.0 - 31.0 mmol/L    Calcium 8.9 8.4 - 10.2 mg/dL    Total Protein 7.0 6.3 - 8.6 g/dL    Albumin 3.40 3.40 - 4.80 g/dL    ALT (SGPT) 36 21 - 72 U/L    AST (SGOT) 46 17 - 59 U/L    Alkaline Phosphatase 91 38 - 126 U/L    Total Bilirubin 0.5 0.2 - 1.3 mg/dL    eGFR Non  Amer 141 63 - 147 mL/min/1.73    Globulin 3.6 (H) 2.3 - 3.5 gm/dL    A/G Ratio 0.9 (L) 1.1 - 1.8 g/dL    BUN/Creatinine Ratio 14.3 7.0 - 25.0    Anion Gap 9.0 5.0 - 15.0 mmol/L   CBC Auto Differential   Result Value Ref Range    WBC 9.07 3.20 - 9.80 10*3/mm3    RBC 4.65 4.37 - 5.74 10*6/mm3    Hemoglobin 13.0 (L) 13.7 - 17.3 g/dL    Hematocrit 38.6 (L) 39.0 - 49.0 %    MCV 83.0 80.0 - 98.0 fL    MCH 28.0 26.5 - 34.0 pg    MCHC 33.7 31.5 - 36.3 g/dL    RDW 14.2 11.5 - 14.5 %    RDW-SD 42.3 35.1 - 43.9 fl    MPV 8.9 8.0 - 12.0 fL    Platelets 174 150 - 450 10*3/mm3    Neutrophil % 76.5 37.0 - 80.0 %    Lymphocyte % 12.1 10.0 - 50.0 %    Monocyte % 7.7 0.0 - 12.0 %     Eosinophil % 2.3 0.0 - 7.0 %    Basophil % 0.1 0.0 - 2.0 %    Immature Grans % 1.3 (H) 0.0 - 0.5 %    Neutrophils, Absolute 6.93 2.00 - 8.60 10*3/mm3    Lymphocytes, Absolute 1.10 0.60 - 4.20 10*3/mm3    Monocytes, Absolute 0.70 0.00 - 0.90 10*3/mm3    Eosinophils, Absolute 0.21 0.00 - 0.70 10*3/mm3    Basophils, Absolute 0.01 0.00 - 0.20 10*3/mm3    Immature Grans, Absolute 0.12 (H) 0.00 - 0.02 10*3/mm3   CK   Result Value Ref Range    Creatine Kinase 163 55 - 170 U/L   CK-MB   Result Value Ref Range    CKMB 1.88 0.00 - 5.00 ng/mL   Troponin   Result Value Ref Range    Troponin I <0.012 <=0.034 ng/mL   Light Blue Top   Result Value Ref Range    Extra Tube hold for add-on    Green Top (Gel)   Result Value Ref Range    Extra Tube Hold for add-ons.    Lavender Top   Result Value Ref Range    Extra Tube hold for add-on    Gold Top - SST   Result Value Ref Range    Extra Tube Hold for add-ons.                  MDM  Number of Diagnoses or Management Options  Bronchitis:      Amount and/or Complexity of Data Reviewed  Clinical lab tests: reviewed and ordered  Tests in the radiology section of CPT®: reviewed and ordered  Tests in the medicine section of CPT®: reviewed and ordered    Risk of Complications, Morbidity, and/or Mortality  General comments: Patient did well after nebs and steroids in the emergency department.  We will send home with some cough syrup tonight and antibiotics for the next few days.  Follow-up with primary care or return to emergency department as needed    Patient Progress  Patient progress: stable        Final diagnoses:   Bronchitis            Fabio Jordan, APRN  11/03/18 2024

## 2018-11-23 ENCOUNTER — RESULTS ENCOUNTER (OUTPATIENT)
Dept: BARIATRICS/WEIGHT MGMT | Facility: CLINIC | Age: 38
End: 2018-11-23

## 2018-11-23 DIAGNOSIS — E66.9 DIABETES MELLITUS TYPE 2 IN OBESE (HCC): ICD-10-CM

## 2018-11-23 DIAGNOSIS — E66.01 MORBID OBESITY WITH BMI OF 70 AND OVER, ADULT (HCC): ICD-10-CM

## 2018-11-23 DIAGNOSIS — K21.9 GASTROESOPHAGEAL REFLUX DISEASE, ESOPHAGITIS PRESENCE NOT SPECIFIED: ICD-10-CM

## 2018-11-23 DIAGNOSIS — E11.69 DIABETES MELLITUS TYPE 2 IN OBESE (HCC): ICD-10-CM

## 2018-11-23 DIAGNOSIS — E78.2 MIXED HYPERLIPIDEMIA: ICD-10-CM

## 2018-11-23 DIAGNOSIS — I10 UNCONTROLLED HYPERTENSION: ICD-10-CM

## 2018-12-06 ENCOUNTER — APPOINTMENT (OUTPATIENT)
Dept: GENERAL RADIOLOGY | Facility: HOSPITAL | Age: 38
End: 2018-12-06

## 2018-12-06 ENCOUNTER — HOSPITAL ENCOUNTER (INPATIENT)
Facility: HOSPITAL | Age: 38
LOS: 4 days | Discharge: HOME | End: 2018-12-10
Attending: EMERGENCY MEDICINE | Admitting: FAMILY MEDICINE

## 2018-12-06 DIAGNOSIS — R06.03 RESPIRATORY DISTRESS: ICD-10-CM

## 2018-12-06 DIAGNOSIS — J18.9 PNEUMONIA OF RIGHT MIDDLE LOBE DUE TO INFECTIOUS ORGANISM: Primary | ICD-10-CM

## 2018-12-06 LAB
A-A DO2: ABNORMAL MMHG
ANION GAP SERPL CALCULATED.3IONS-SCNC: 9 MMOL/L (ref 5–15)
APTT PPP: 31.9 SECONDS (ref 20–40.3)
ARTERIAL PATENCY WRIST A: POSITIVE
ATMOSPHERIC PRESS: 757 MMHG
BASE EXCESS BLDA CALC-SCNC: 4.5 MMOL/L (ref 0–2)
BASOPHILS # BLD AUTO: 0.02 10*3/MM3 (ref 0–0.2)
BASOPHILS NFR BLD AUTO: 0.2 % (ref 0–2)
BDY SITE: ABNORMAL
BUN BLD-MCNC: 9 MG/DL (ref 7–21)
BUN/CREAT SERPL: 15.8 (ref 7–25)
CA-I BLD-MCNC: 4.87 MG/DL (ref 4.6–5.6)
CALCIUM SPEC-SCNC: 9.1 MG/DL (ref 8.4–10.2)
CHLORIDE SERPL-SCNC: 91 MMOL/L (ref 95–110)
CO2 SERPL-SCNC: 28 MMOL/L (ref 22–31)
COHGB MFR BLD: 1.2 % (ref 0–5)
CREAT BLD-MCNC: 0.57 MG/DL (ref 0.7–1.3)
D-DIMER, QUANTITATIVE (MAD,POW, STR): 363 NG/ML (FEU) (ref 0–470)
DEPRECATED RDW RBC AUTO: 42 FL (ref 35.1–43.9)
EOSINOPHIL # BLD AUTO: 0.31 10*3/MM3 (ref 0–0.7)
EOSINOPHIL NFR BLD AUTO: 3.5 % (ref 0–7)
ERYTHROCYTE [DISTWIDTH] IN BLOOD BY AUTOMATED COUNT: 14.1 % (ref 11.5–14.5)
FLUAV AG NPH QL: NEGATIVE
FLUBV AG NPH QL IA: NEGATIVE
GFR SERPL CREATININE-BSD FRML MDRD: 158 ML/MIN/1.73 (ref 63–147)
GLUCOSE BLD-MCNC: 311 MG/DL (ref 60–100)
GLUCOSE BLDA-MCNC: 312 MMOL/L (ref 65–95)
HCO3 BLDA-SCNC: 28.5 MMOL/L (ref 20–26)
HCT VFR BLD AUTO: 39.7 % (ref 39–49)
HCT VFR BLD CALC: 42.6 % (ref 38–51)
HGB BLD-MCNC: 13.8 G/DL (ref 13.7–17.3)
HGB BLDA-MCNC: 13.9 G/DL (ref 14–18)
HOLD SPECIMEN: NORMAL
IMM GRANULOCYTES # BLD: 0.1 10*3/MM3 (ref 0–0.02)
IMM GRANULOCYTES NFR BLD: 1.1 % (ref 0–0.5)
INR PPP: 0.97 (ref 0.8–1.2)
LYMPHOCYTES # BLD AUTO: 1.58 10*3/MM3 (ref 0.6–4.2)
LYMPHOCYTES NFR BLD AUTO: 17.9 % (ref 10–50)
Lab: ABNORMAL
MAGNESIUM SERPL-MCNC: 1.9 MG/DL (ref 1.6–2.3)
MCH RBC QN AUTO: 28.2 PG (ref 26.5–34)
MCHC RBC AUTO-ENTMCNC: 34.8 G/DL (ref 31.5–36.3)
MCV RBC AUTO: 81.2 FL (ref 80–98)
METHGB BLD QL: 0.3 % (ref 0–3)
MODALITY: ABNORMAL
MONOCYTES # BLD AUTO: 0.94 10*3/MM3 (ref 0–0.9)
MONOCYTES NFR BLD AUTO: 10.7 % (ref 0–12)
NEUTROPHILS # BLD AUTO: 5.87 10*3/MM3 (ref 2–8.6)
NEUTROPHILS NFR BLD AUTO: 66.6 % (ref 37–80)
NOTE: ABNORMAL
NT-PROBNP SERPL-MCNC: 94.4 PG/ML (ref 0–450)
OXYHGB MFR BLDV: 95.6 % (ref 94–99)
PCO2 BLDA: 39 MM HG (ref 35–45)
PCO2 TEMP ADJ BLD: ABNORMAL MM HG (ref 35–48)
PH BLDA: 7.47 PH UNITS (ref 7.35–7.45)
PH, TEMP CORRECTED: ABNORMAL PH UNITS
PLATELET # BLD AUTO: 167 10*3/MM3 (ref 150–450)
PMV BLD AUTO: 9.1 FL (ref 8–12)
PO2 BLDA: 75.8 MM HG (ref 83–108)
PO2 TEMP ADJ BLD: ABNORMAL MM HG (ref 83–108)
POTASSIUM BLD-SCNC: 3.8 MMOL/L (ref 3.5–5.1)
POTASSIUM BLDA-SCNC: 3.8 MMOL/L (ref 3.4–4.5)
PROTHROMBIN TIME: 12.7 SECONDS (ref 11.1–15.3)
RBC # BLD AUTO: 4.89 10*6/MM3 (ref 4.37–5.74)
SAO2 % BLDCOA: 97.1 % (ref 94–99)
SODIUM BLD-SCNC: 128 MMOL/L (ref 137–145)
SODIUM BLDA-SCNC: 133 MMOL/L (ref 136–146)
TROPONIN I SERPL-MCNC: <0.012 NG/ML
VENTILATOR MODE: ABNORMAL
WBC NRBC COR # BLD: 8.82 10*3/MM3 (ref 3.2–9.8)

## 2018-12-06 PROCEDURE — 94799 UNLISTED PULMONARY SVC/PX: CPT

## 2018-12-06 PROCEDURE — 82375 ASSAY CARBOXYHB QUANT: CPT

## 2018-12-06 PROCEDURE — 93010 ELECTROCARDIOGRAM REPORT: CPT | Performed by: INTERNAL MEDICINE

## 2018-12-06 PROCEDURE — 87205 SMEAR GRAM STAIN: CPT | Performed by: EMERGENCY MEDICINE

## 2018-12-06 PROCEDURE — 85730 THROMBOPLASTIN TIME PARTIAL: CPT | Performed by: EMERGENCY MEDICINE

## 2018-12-06 PROCEDURE — 93005 ELECTROCARDIOGRAM TRACING: CPT

## 2018-12-06 PROCEDURE — 82805 BLOOD GASES W/O2 SATURATION: CPT

## 2018-12-06 PROCEDURE — 83880 ASSAY OF NATRIURETIC PEPTIDE: CPT | Performed by: EMERGENCY MEDICINE

## 2018-12-06 PROCEDURE — 87804 INFLUENZA ASSAY W/OPTIC: CPT | Performed by: EMERGENCY MEDICINE

## 2018-12-06 PROCEDURE — 63710000001 INSULIN REGULAR HUMAN PER 5 UNITS: Performed by: EMERGENCY MEDICINE

## 2018-12-06 PROCEDURE — 85025 COMPLETE CBC W/AUTO DIFF WBC: CPT | Performed by: EMERGENCY MEDICINE

## 2018-12-06 PROCEDURE — 99285 EMERGENCY DEPT VISIT HI MDM: CPT

## 2018-12-06 PROCEDURE — 25010000002 MAGNESIUM SULFATE 2 GM/50ML SOLUTION: Performed by: EMERGENCY MEDICINE

## 2018-12-06 PROCEDURE — 71046 X-RAY EXAM CHEST 2 VIEWS: CPT

## 2018-12-06 PROCEDURE — 87040 BLOOD CULTURE FOR BACTERIA: CPT | Performed by: EMERGENCY MEDICINE

## 2018-12-06 PROCEDURE — 85610 PROTHROMBIN TIME: CPT | Performed by: EMERGENCY MEDICINE

## 2018-12-06 PROCEDURE — 94640 AIRWAY INHALATION TREATMENT: CPT

## 2018-12-06 PROCEDURE — 83735 ASSAY OF MAGNESIUM: CPT | Performed by: EMERGENCY MEDICINE

## 2018-12-06 PROCEDURE — 84484 ASSAY OF TROPONIN QUANT: CPT | Performed by: EMERGENCY MEDICINE

## 2018-12-06 PROCEDURE — 80048 BASIC METABOLIC PNL TOTAL CA: CPT | Performed by: EMERGENCY MEDICINE

## 2018-12-06 PROCEDURE — 93005 ELECTROCARDIOGRAM TRACING: CPT | Performed by: EMERGENCY MEDICINE

## 2018-12-06 PROCEDURE — 36415 COLL VENOUS BLD VENIPUNCTURE: CPT

## 2018-12-06 PROCEDURE — 94660 CPAP INITIATION&MGMT: CPT

## 2018-12-06 PROCEDURE — 83050 HGB METHEMOGLOBIN QUAN: CPT

## 2018-12-06 PROCEDURE — 36600 WITHDRAWAL OF ARTERIAL BLOOD: CPT

## 2018-12-06 PROCEDURE — 25010000002 METHYLPREDNISOLONE PER 125 MG: Performed by: EMERGENCY MEDICINE

## 2018-12-06 PROCEDURE — 85379 FIBRIN DEGRADATION QUANT: CPT | Performed by: EMERGENCY MEDICINE

## 2018-12-06 PROCEDURE — 87070 CULTURE OTHR SPECIMN AEROBIC: CPT | Performed by: EMERGENCY MEDICINE

## 2018-12-06 RX ORDER — SODIUM CHLORIDE 9 MG/ML
125 INJECTION, SOLUTION INTRAVENOUS CONTINUOUS
Status: DISCONTINUED | OUTPATIENT
Start: 2018-12-06 | End: 2018-12-09

## 2018-12-06 RX ORDER — NEBIVOLOL HYDROCHLORIDE 10 MG/1
5 TABLET ORAL 2 TIMES DAILY
COMMUNITY
Start: 2018-10-04 | End: 2020-09-29

## 2018-12-06 RX ORDER — IPRATROPIUM BROMIDE AND ALBUTEROL SULFATE 2.5; .5 MG/3ML; MG/3ML
3 SOLUTION RESPIRATORY (INHALATION)
Status: DISCONTINUED | OUTPATIENT
Start: 2018-12-06 | End: 2018-12-06

## 2018-12-06 RX ORDER — PRASUGREL 10 MG/1
10 TABLET, FILM COATED ORAL
COMMUNITY
Start: 2018-09-13 | End: 2019-05-14

## 2018-12-06 RX ORDER — METHYLPREDNISOLONE SODIUM SUCCINATE 125 MG/2ML
125 INJECTION, POWDER, LYOPHILIZED, FOR SOLUTION INTRAMUSCULAR; INTRAVENOUS ONCE
Status: COMPLETED | OUTPATIENT
Start: 2018-12-06 | End: 2018-12-06

## 2018-12-06 RX ORDER — IPRATROPIUM BROMIDE AND ALBUTEROL SULFATE 2.5; .5 MG/3ML; MG/3ML
3 SOLUTION RESPIRATORY (INHALATION)
Status: DISCONTINUED | OUTPATIENT
Start: 2018-12-06 | End: 2018-12-07

## 2018-12-06 RX ORDER — MAGNESIUM SULFATE HEPTAHYDRATE 40 MG/ML
2 INJECTION, SOLUTION INTRAVENOUS ONCE
Status: COMPLETED | OUTPATIENT
Start: 2018-12-06 | End: 2018-12-07

## 2018-12-06 RX ADMIN — MAGNESIUM SULFATE HEPTAHYDRATE 2 G: 40 INJECTION, SOLUTION INTRAVENOUS at 23:36

## 2018-12-06 RX ADMIN — SODIUM CHLORIDE 500 ML: 9 INJECTION, SOLUTION INTRAVENOUS at 22:02

## 2018-12-06 RX ADMIN — IPRATROPIUM BROMIDE AND ALBUTEROL SULFATE 3 ML: 2.5; .5 SOLUTION RESPIRATORY (INHALATION) at 22:33

## 2018-12-06 RX ADMIN — METHYLPREDNISOLONE SODIUM SUCCINATE 125 MG: 125 INJECTION, POWDER, FOR SOLUTION INTRAMUSCULAR; INTRAVENOUS at 22:04

## 2018-12-06 RX ADMIN — IPRATROPIUM BROMIDE AND ALBUTEROL SULFATE 3 ML: 2.5; .5 SOLUTION RESPIRATORY (INHALATION) at 21:14

## 2018-12-06 RX ADMIN — HUMAN INSULIN 6 UNITS: 100 INJECTION, SOLUTION SUBCUTANEOUS at 23:41

## 2018-12-06 RX ADMIN — IPRATROPIUM BROMIDE AND ALBUTEROL SULFATE 3 ML: 2.5; .5 SOLUTION RESPIRATORY (INHALATION) at 22:54

## 2018-12-07 PROBLEM — J96.01 ACUTE RESPIRATORY FAILURE WITH HYPOXIA: Status: ACTIVE | Noted: 2018-12-07

## 2018-12-07 PROBLEM — J16.0 PNEUMONIA OF RIGHT LOWER LOBE DUE TO CHLAMYDIA SPECIES: Status: ACTIVE | Noted: 2018-12-06

## 2018-12-07 LAB
ALBUMIN SERPL-MCNC: 4.2 G/DL (ref 3.4–4.8)
ALBUMIN/GLOB SERPL: 1.3 G/DL (ref 1.1–1.8)
ALP SERPL-CCNC: 102 U/L (ref 38–126)
ALT SERPL W P-5'-P-CCNC: 25 U/L (ref 21–72)
ANION GAP SERPL CALCULATED.3IONS-SCNC: 16 MMOL/L (ref 5–15)
ARTERIAL PATENCY WRIST A: ABNORMAL
AST SERPL-CCNC: 30 U/L (ref 17–59)
ATMOSPHERIC PRESS: 758 MMHG
BASE EXCESS BLDA CALC-SCNC: 2.6 MMOL/L (ref 0–2)
BASOPHILS # BLD AUTO: 0.03 10*3/MM3 (ref 0–0.2)
BASOPHILS NFR BLD AUTO: 0.3 % (ref 0–2)
BDY SITE: ABNORMAL
BILIRUB SERPL-MCNC: 0.7 MG/DL (ref 0.2–1.3)
BUN BLD-MCNC: 14 MG/DL (ref 7–21)
BUN/CREAT SERPL: 24.6 (ref 7–25)
CALCIUM SPEC-SCNC: 9.3 MG/DL (ref 8.4–10.2)
CHLORIDE SERPL-SCNC: 93 MMOL/L (ref 95–110)
CO2 SERPL-SCNC: 24 MMOL/L (ref 22–31)
CREAT BLD-MCNC: 0.57 MG/DL (ref 0.7–1.3)
DEPRECATED RDW RBC AUTO: 43.1 FL (ref 35.1–43.9)
EOSINOPHIL # BLD AUTO: 0.02 10*3/MM3 (ref 0–0.7)
EOSINOPHIL NFR BLD AUTO: 0.2 % (ref 0–7)
EPAP: 5
ERYTHROCYTE [DISTWIDTH] IN BLOOD BY AUTOMATED COUNT: 14.3 % (ref 11.5–14.5)
GFR SERPL CREATININE-BSD FRML MDRD: 158 ML/MIN/1.73 (ref 63–147)
GLOBULIN UR ELPH-MCNC: 3.3 GM/DL (ref 2.3–3.5)
GLUCOSE BLD-MCNC: 413 MG/DL (ref 60–100)
GLUCOSE BLD-MCNC: 486 MG/DL (ref 60–100)
GLUCOSE BLDC GLUCOMTR-MCNC: 296 MG/DL (ref 70–130)
GLUCOSE BLDC GLUCOMTR-MCNC: 324 MG/DL (ref 70–130)
GLUCOSE BLDC GLUCOMTR-MCNC: 353 MG/DL (ref 70–130)
HCO3 BLDA-SCNC: 26.7 MMOL/L (ref 20–26)
HCT VFR BLD AUTO: 42.5 % (ref 39–49)
HGB BLD-MCNC: 14.3 G/DL (ref 13.7–17.3)
HOROWITZ INDEX BLD+IHG-RTO: 28 %
IMM GRANULOCYTES # BLD: 0.11 10*3/MM3 (ref 0–0.02)
IMM GRANULOCYTES NFR BLD: 1 % (ref 0–0.5)
IPAP: 12
L PNEUMO1 AG UR QL IA: NEGATIVE
LYMPHOCYTES # BLD AUTO: 1.03 10*3/MM3 (ref 0.6–4.2)
LYMPHOCYTES NFR BLD AUTO: 8.9 % (ref 10–50)
Lab: ABNORMAL
MCH RBC QN AUTO: 27.6 PG (ref 26.5–34)
MCHC RBC AUTO-ENTMCNC: 33.6 G/DL (ref 31.5–36.3)
MCV RBC AUTO: 82 FL (ref 80–98)
MODALITY: ABNORMAL
MONOCYTES # BLD AUTO: 0.37 10*3/MM3 (ref 0–0.9)
MONOCYTES NFR BLD AUTO: 3.2 % (ref 0–12)
MYCOPLASMAE PNEUMONIAE BY PCR: NEGATIVE
NEUTROPHILS # BLD AUTO: 9.96 10*3/MM3 (ref 2–8.6)
NEUTROPHILS NFR BLD AUTO: 86.4 % (ref 37–80)
NRBC BLD MANUAL-RTO: 0 /100 WBC (ref 0–0)
PCO2 BLDA: 38.8 MM HG (ref 35–45)
PH BLDA: 7.45 PH UNITS (ref 7.35–7.45)
PLATELET # BLD AUTO: 189 10*3/MM3 (ref 150–450)
PMV BLD AUTO: 8.9 FL (ref 8–12)
PO2 BLDA: 122 MM HG (ref 83–108)
POTASSIUM BLD-SCNC: 4.5 MMOL/L (ref 3.5–5.1)
PROT SERPL-MCNC: 7.5 G/DL (ref 6.3–8.6)
RBC # BLD AUTO: 5.18 10*6/MM3 (ref 4.37–5.74)
S PNEUM AG SPEC QL LA: NEGATIVE
SAO2 % BLDCOA: 99.2 % (ref 94–99)
SET MECH RESP RATE: 18
SODIUM BLD-SCNC: 133 MMOL/L (ref 137–145)
VENTILATOR MODE: ABNORMAL
WBC NRBC COR # BLD: 11.52 10*3/MM3 (ref 3.2–9.8)

## 2018-12-07 PROCEDURE — 63710000001 INSULIN ASPART PER 5 UNITS: Performed by: STUDENT IN AN ORGANIZED HEALTH CARE EDUCATION/TRAINING PROGRAM

## 2018-12-07 PROCEDURE — 82962 GLUCOSE BLOOD TEST: CPT

## 2018-12-07 PROCEDURE — 94799 UNLISTED PULMONARY SVC/PX: CPT

## 2018-12-07 PROCEDURE — 94760 N-INVAS EAR/PLS OXIMETRY 1: CPT

## 2018-12-07 PROCEDURE — 94640 AIRWAY INHALATION TREATMENT: CPT

## 2018-12-07 PROCEDURE — 25010000002 CEFTRIAXONE PER 250 MG: Performed by: EMERGENCY MEDICINE

## 2018-12-07 PROCEDURE — 94660 CPAP INITIATION&MGMT: CPT

## 2018-12-07 PROCEDURE — 63710000001 INSULIN DETEMIR PER 5 UNITS: Performed by: STUDENT IN AN ORGANIZED HEALTH CARE EDUCATION/TRAINING PROGRAM

## 2018-12-07 PROCEDURE — 25010000002 METHYLPREDNISOLONE PER 40 MG: Performed by: FAMILY MEDICINE

## 2018-12-07 PROCEDURE — 36600 WITHDRAWAL OF ARTERIAL BLOOD: CPT

## 2018-12-07 PROCEDURE — 85025 COMPLETE CBC W/AUTO DIFF WBC: CPT | Performed by: STUDENT IN AN ORGANIZED HEALTH CARE EDUCATION/TRAINING PROGRAM

## 2018-12-07 PROCEDURE — 87899 AGENT NOS ASSAY W/OPTIC: CPT | Performed by: STUDENT IN AN ORGANIZED HEALTH CARE EDUCATION/TRAINING PROGRAM

## 2018-12-07 PROCEDURE — 25010000002 AZITHROMYCIN PER 500 MG: Performed by: EMERGENCY MEDICINE

## 2018-12-07 PROCEDURE — 82803 BLOOD GASES ANY COMBINATION: CPT

## 2018-12-07 PROCEDURE — 87040 BLOOD CULTURE FOR BACTERIA: CPT | Performed by: EMERGENCY MEDICINE

## 2018-12-07 PROCEDURE — 63710000001 INSULIN REGULAR HUMAN PER 5 UNITS: Performed by: STUDENT IN AN ORGANIZED HEALTH CARE EDUCATION/TRAINING PROGRAM

## 2018-12-07 PROCEDURE — 87581 M.PNEUMON DNA AMP PROBE: CPT | Performed by: STUDENT IN AN ORGANIZED HEALTH CARE EDUCATION/TRAINING PROGRAM

## 2018-12-07 PROCEDURE — 36415 COLL VENOUS BLD VENIPUNCTURE: CPT | Performed by: EMERGENCY MEDICINE

## 2018-12-07 PROCEDURE — 82947 ASSAY GLUCOSE BLOOD QUANT: CPT | Performed by: INTERNAL MEDICINE

## 2018-12-07 PROCEDURE — 80053 COMPREHEN METABOLIC PANEL: CPT | Performed by: STUDENT IN AN ORGANIZED HEALTH CARE EDUCATION/TRAINING PROGRAM

## 2018-12-07 RX ORDER — PRASUGREL 10 MG/1
10 TABLET, FILM COATED ORAL DAILY
Status: DISCONTINUED | OUTPATIENT
Start: 2018-12-07 | End: 2018-12-10 | Stop reason: HOSPADM

## 2018-12-07 RX ORDER — IPRATROPIUM BROMIDE AND ALBUTEROL SULFATE 2.5; .5 MG/3ML; MG/3ML
3 SOLUTION RESPIRATORY (INHALATION)
Status: DISCONTINUED | OUTPATIENT
Start: 2018-12-07 | End: 2018-12-10 | Stop reason: HOSPADM

## 2018-12-07 RX ORDER — NITROGLYCERIN 0.4 MG/1
0.4 TABLET SUBLINGUAL
Status: DISCONTINUED | OUTPATIENT
Start: 2018-12-07 | End: 2018-12-10 | Stop reason: HOSPADM

## 2018-12-07 RX ORDER — METHYLPREDNISOLONE SODIUM SUCCINATE 125 MG/2ML
60 INJECTION, POWDER, LYOPHILIZED, FOR SOLUTION INTRAMUSCULAR; INTRAVENOUS EVERY 12 HOURS
Status: DISCONTINUED | OUTPATIENT
Start: 2018-12-07 | End: 2018-12-07

## 2018-12-07 RX ORDER — BUDESONIDE AND FORMOTEROL FUMARATE DIHYDRATE 160; 4.5 UG/1; UG/1
2 AEROSOL RESPIRATORY (INHALATION)
Status: DISCONTINUED | OUTPATIENT
Start: 2018-12-07 | End: 2018-12-10 | Stop reason: HOSPADM

## 2018-12-07 RX ORDER — ACETAMINOPHEN 325 MG/1
650 TABLET ORAL EVERY 4 HOURS PRN
Status: DISCONTINUED | OUTPATIENT
Start: 2018-12-07 | End: 2018-12-10 | Stop reason: HOSPADM

## 2018-12-07 RX ORDER — PRAMIPEXOLE DIHYDROCHLORIDE 1 MG/1
1 TABLET ORAL NIGHTLY
Status: DISCONTINUED | OUTPATIENT
Start: 2018-12-07 | End: 2018-12-10 | Stop reason: HOSPADM

## 2018-12-07 RX ORDER — NEBIVOLOL 5 MG/1
5 TABLET ORAL 2 TIMES DAILY
Status: DISCONTINUED | OUTPATIENT
Start: 2018-12-07 | End: 2018-12-07

## 2018-12-07 RX ORDER — IPRATROPIUM BROMIDE AND ALBUTEROL SULFATE 2.5; .5 MG/3ML; MG/3ML
3 SOLUTION RESPIRATORY (INHALATION) EVERY 4 HOURS PRN
Status: DISCONTINUED | OUTPATIENT
Start: 2018-12-07 | End: 2018-12-10 | Stop reason: HOSPADM

## 2018-12-07 RX ORDER — NICOTINE POLACRILEX 4 MG
15 LOZENGE BUCCAL
Status: DISCONTINUED | OUTPATIENT
Start: 2018-12-07 | End: 2018-12-10 | Stop reason: HOSPADM

## 2018-12-07 RX ORDER — IPRATROPIUM BROMIDE AND ALBUTEROL SULFATE 2.5; .5 MG/3ML; MG/3ML
3 SOLUTION RESPIRATORY (INHALATION)
Status: DISCONTINUED | OUTPATIENT
Start: 2018-12-07 | End: 2018-12-07

## 2018-12-07 RX ORDER — NEBIVOLOL 5 MG/1
10 TABLET ORAL
Status: DISCONTINUED | OUTPATIENT
Start: 2018-12-07 | End: 2018-12-07

## 2018-12-07 RX ORDER — DEXTROSE MONOHYDRATE 25 G/50ML
25 INJECTION, SOLUTION INTRAVENOUS
Status: DISCONTINUED | OUTPATIENT
Start: 2018-12-07 | End: 2018-12-10 | Stop reason: HOSPADM

## 2018-12-07 RX ORDER — LISINOPRIL 40 MG/1
40 TABLET ORAL NIGHTLY
Status: DISCONTINUED | OUTPATIENT
Start: 2018-12-07 | End: 2018-12-10 | Stop reason: HOSPADM

## 2018-12-07 RX ORDER — SODIUM CHLORIDE 0.9 % (FLUSH) 0.9 %
3-10 SYRINGE (ML) INJECTION AS NEEDED
Status: DISCONTINUED | OUTPATIENT
Start: 2018-12-07 | End: 2018-12-10 | Stop reason: HOSPADM

## 2018-12-07 RX ORDER — SERTRALINE HYDROCHLORIDE 25 MG/1
25 TABLET, FILM COATED ORAL NIGHTLY
Status: DISCONTINUED | OUTPATIENT
Start: 2018-12-07 | End: 2018-12-10 | Stop reason: HOSPADM

## 2018-12-07 RX ORDER — ONDANSETRON 2 MG/ML
4 INJECTION INTRAMUSCULAR; INTRAVENOUS EVERY 6 HOURS PRN
Status: DISCONTINUED | OUTPATIENT
Start: 2018-12-07 | End: 2018-12-10 | Stop reason: HOSPADM

## 2018-12-07 RX ORDER — AMLODIPINE BESYLATE 10 MG/1
10 TABLET ORAL NIGHTLY
Status: DISCONTINUED | OUTPATIENT
Start: 2018-12-07 | End: 2018-12-10 | Stop reason: HOSPADM

## 2018-12-07 RX ORDER — IPRATROPIUM BROMIDE AND ALBUTEROL SULFATE 2.5; .5 MG/3ML; MG/3ML
SOLUTION RESPIRATORY (INHALATION)
Status: COMPLETED
Start: 2018-12-07 | End: 2018-12-07

## 2018-12-07 RX ORDER — PANTOPRAZOLE SODIUM 20 MG/1
40 TABLET, DELAYED RELEASE ORAL NIGHTLY
Status: DISCONTINUED | OUTPATIENT
Start: 2018-12-07 | End: 2018-12-10 | Stop reason: HOSPADM

## 2018-12-07 RX ORDER — SODIUM CHLORIDE 0.9 % (FLUSH) 0.9 %
3 SYRINGE (ML) INJECTION EVERY 12 HOURS SCHEDULED
Status: DISCONTINUED | OUTPATIENT
Start: 2018-12-07 | End: 2018-12-10 | Stop reason: HOSPADM

## 2018-12-07 RX ORDER — METHYLPREDNISOLONE SODIUM SUCCINATE 40 MG/ML
40 INJECTION, POWDER, LYOPHILIZED, FOR SOLUTION INTRAMUSCULAR; INTRAVENOUS EVERY 8 HOURS
Status: DISCONTINUED | OUTPATIENT
Start: 2018-12-07 | End: 2018-12-09

## 2018-12-07 RX ORDER — TRAZODONE HYDROCHLORIDE 150 MG/1
300 TABLET ORAL NIGHTLY
Status: DISCONTINUED | OUTPATIENT
Start: 2018-12-07 | End: 2018-12-10 | Stop reason: HOSPADM

## 2018-12-07 RX ORDER — RANOLAZINE 500 MG/1
1000 TABLET, EXTENDED RELEASE ORAL EVERY 12 HOURS SCHEDULED
Status: DISCONTINUED | OUTPATIENT
Start: 2018-12-07 | End: 2018-12-10 | Stop reason: HOSPADM

## 2018-12-07 RX ORDER — ISOSORBIDE MONONITRATE 60 MG/1
120 TABLET, EXTENDED RELEASE ORAL NIGHTLY
Status: DISCONTINUED | OUTPATIENT
Start: 2018-12-07 | End: 2018-12-10 | Stop reason: HOSPADM

## 2018-12-07 RX ORDER — NEBIVOLOL 5 MG/1
5 TABLET ORAL 2 TIMES DAILY
Status: DISCONTINUED | OUTPATIENT
Start: 2018-12-07 | End: 2018-12-10 | Stop reason: HOSPADM

## 2018-12-07 RX ADMIN — PRAMIPEXOLE DIHYDROCHLORIDE 1 MG: 1 TABLET ORAL at 21:59

## 2018-12-07 RX ADMIN — IPRATROPIUM BROMIDE AND ALBUTEROL SULFATE 3 ML: 2.5; .5 SOLUTION RESPIRATORY (INHALATION) at 07:13

## 2018-12-07 RX ADMIN — TRAZODONE HYDROCHLORIDE 300 MG: 150 TABLET ORAL at 22:06

## 2018-12-07 RX ADMIN — INSULIN ASPART 20 UNITS: 100 INJECTION, SOLUTION INTRAVENOUS; SUBCUTANEOUS at 17:41

## 2018-12-07 RX ADMIN — CEFTRIAXONE SODIUM 1 G: 1 INJECTION, POWDER, FOR SOLUTION INTRAMUSCULAR; INTRAVENOUS at 01:13

## 2018-12-07 RX ADMIN — NEBIVOLOL HYDROCHLORIDE 5 MG: 5 TABLET ORAL at 22:03

## 2018-12-07 RX ADMIN — PANTOPRAZOLE SODIUM 40 MG: 20 TABLET, DELAYED RELEASE ORAL at 21:52

## 2018-12-07 RX ADMIN — IPRATROPIUM BROMIDE AND ALBUTEROL SULFATE 3 ML: 2.5; .5 SOLUTION RESPIRATORY (INHALATION) at 19:47

## 2018-12-07 RX ADMIN — SERTRALINE HYDROCHLORIDE 25 MG: 25 TABLET ORAL at 22:06

## 2018-12-07 RX ADMIN — PRASUGREL HYDROCHLORIDE 10 MG: 10 TABLET, FILM COATED ORAL at 09:28

## 2018-12-07 RX ADMIN — HUMAN INSULIN 24 UNITS: 100 INJECTION, SOLUTION SUBCUTANEOUS at 09:27

## 2018-12-07 RX ADMIN — METHYLPREDNISOLONE SODIUM SUCCINATE 40 MG: 40 INJECTION, POWDER, FOR SOLUTION INTRAMUSCULAR; INTRAVENOUS at 22:10

## 2018-12-07 RX ADMIN — INSULIN ASPART 24 UNITS: 100 INJECTION, SOLUTION INTRAVENOUS; SUBCUTANEOUS at 21:55

## 2018-12-07 RX ADMIN — RANOLAZINE 1000 MG: 500 TABLET, FILM COATED, EXTENDED RELEASE ORAL at 21:54

## 2018-12-07 RX ADMIN — RIVAROXABAN 20 MG: 10 TABLET, FILM COATED ORAL at 17:41

## 2018-12-07 RX ADMIN — ISOSORBIDE MONONITRATE 120 MG: 60 TABLET, EXTENDED RELEASE ORAL at 21:52

## 2018-12-07 RX ADMIN — SODIUM CHLORIDE, PRESERVATIVE FREE 3 ML: 5 INJECTION INTRAVENOUS at 22:08

## 2018-12-07 RX ADMIN — INSULIN DETEMIR 60 UNITS: 100 INJECTION, SOLUTION SUBCUTANEOUS at 21:57

## 2018-12-07 RX ADMIN — SODIUM CHLORIDE, PRESERVATIVE FREE 3 ML: 5 INJECTION INTRAVENOUS at 11:12

## 2018-12-07 RX ADMIN — BUDESONIDE AND FORMOTEROL FUMARATE DIHYDRATE 2 PUFF: 160; 4.5 AEROSOL RESPIRATORY (INHALATION) at 19:47

## 2018-12-07 RX ADMIN — RANOLAZINE 1000 MG: 500 TABLET, FILM COATED, EXTENDED RELEASE ORAL at 09:28

## 2018-12-07 RX ADMIN — IPRATROPIUM BROMIDE AND ALBUTEROL SULFATE 3 ML: 2.5; .5 SOLUTION RESPIRATORY (INHALATION) at 14:15

## 2018-12-07 RX ADMIN — IPRATROPIUM BROMIDE AND ALBUTEROL SULFATE 3 ML: 2.5; .5 SOLUTION RESPIRATORY (INHALATION) at 11:00

## 2018-12-07 RX ADMIN — HUMAN INSULIN 24 UNITS: 100 INJECTION, SOLUTION SUBCUTANEOUS at 11:28

## 2018-12-07 RX ADMIN — LISINOPRIL 40 MG: 40 TABLET ORAL at 21:52

## 2018-12-07 RX ADMIN — SODIUM CHLORIDE 125 ML/HR: 9 INJECTION, SOLUTION INTRAVENOUS at 11:13

## 2018-12-07 RX ADMIN — METHYLPREDNISOLONE SODIUM SUCCINATE 40 MG: 40 INJECTION, POWDER, FOR SOLUTION INTRAMUSCULAR; INTRAVENOUS at 13:39

## 2018-12-07 RX ADMIN — AMLODIPINE BESYLATE 10 MG: 10 TABLET ORAL at 22:06

## 2018-12-07 RX ADMIN — NEBIVOLOL HYDROCHLORIDE 5 MG: 5 TABLET ORAL at 09:45

## 2018-12-07 RX ADMIN — AZITHROMYCIN MONOHYDRATE 500 MG: 500 INJECTION, POWDER, LYOPHILIZED, FOR SOLUTION INTRAVENOUS at 01:52

## 2018-12-07 RX ADMIN — SODIUM CHLORIDE 125 ML/HR: 9 INJECTION, SOLUTION INTRAVENOUS at 23:16

## 2018-12-07 NOTE — ED PROVIDER NOTES
"Subjective   39yo male pmh significant htn/hyperlipidemia/dm2/cad/pe/obesity/david presents ED c/o 3d hx progressive soa/wheezing/productive cough \"green/yellow\" sputum/congestion.  ROS (+) rhinorrhea.  Denies fever/chills/odynophagia/vomiting/chest pain/edema.        History provided by:  Patient  Shortness of Breath   Severity:  Severe  Onset quality:  Gradual  Duration:  3 days  Timing:  Constant  Chronicity:  Recurrent  Associated symptoms: cough and wheezing    Associated symptoms: no fever        Review of Systems   Constitutional: Negative for fever.   HENT: Positive for congestion and rhinorrhea.    Eyes: Negative for redness.   Respiratory: Positive for cough, shortness of breath and wheezing.    Cardiovascular: Negative.    Gastrointestinal: Negative.    Musculoskeletal: Negative.    Skin: Negative.    All other systems reviewed and are negative.      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HCC)    • Fatty liver    • GERD (gastroesophageal reflux disease)    • Hyperlipidemia    • Hypertension    • Morbid obesity (CMS/HCC)    • Pulmonary embolism (CMS/HCC)    • RLS (restless legs syndrome)    • Seizures (CMS/HCC)     last one many years ago   • Sleep apnea     c-pap       Allergies   Allergen Reactions   • Glipizide Other (See Comments)     Slurred Speech   • Reglan [Metoclopramide] Anxiety   • Tramadol Other (See Comments)     seizures   • Risperdal [Risperidone] Other (See Comments)     Slurred speech       Past Surgical History:   Procedure Laterality Date   • ADENOIDECTOMY     • CHOLECYSTECTOMY      lap   • CORONARY ANGIOPLASTY WITH STENT PLACEMENT     • TONSILLECTOMY     • TRANSESOPHAGEAL ECHOCARDIOGRAM (MONICA)         Family History   Problem Relation Age of Onset   • Heart disease Mother    • Hypertension Mother    • Hyperlipidemia Mother    • " Coronary artery disease Mother    • Diabetes Mother    • Obesity Mother    • Kidney failure Mother    • Sleep apnea Father    • Cancer Paternal Grandfather        Social History     Socioeconomic History   • Marital status:      Spouse name: Cori   • Number of children: 0   • Years of education: Not on file   • Highest education level: Not on file   Occupational History   • Occupation: Disabled   Tobacco Use   • Smoking status: Former Smoker     Packs/day: 0.25     Years: 0.50     Pack years: 0.12     Types: Cigarettes     Last attempt to quit:      Years since quittin.9   • Smokeless tobacco: Current User     Types: Snuff   • Tobacco comment: quit smoking 25 years ago; he does use snuff   Substance and Sexual Activity   • Alcohol use: No   • Drug use: No   • Sexual activity: Yes     Partners: Female     Birth control/protection: None           Objective   Physical Exam   Constitutional: He is oriented to person, place, and time. He appears well-developed and well-nourished. He appears distressed.   HENT:   Head: Normocephalic and atraumatic.   Right Ear: External ear normal.   Left Ear: External ear normal.   Nose: Nose normal.   Mouth/Throat: Oropharynx is clear and moist.   Eyes: Pupils are equal, round, and reactive to light.   Neck: Normal range of motion. Neck supple. No JVD present. No tracheal deviation present.   Cardiovascular: Normal rate, regular rhythm, normal heart sounds and intact distal pulses. Exam reveals no gallop and no friction rub.   No murmur heard.  Pulmonary/Chest: No accessory muscle usage or stridor. Tachypnea noted. He is in respiratory distress. He has wheezes in the right upper field and the left upper field. He has no rhonchi. He has no rales.   Abdominal: Soft. Bowel sounds are normal. He exhibits no mass. There is no tenderness. There is no guarding.   Musculoskeletal: He exhibits no edema.   Lymphadenopathy:     He has no cervical adenopathy.   Neurological: He is  alert and oriented to person, place, and time.   Skin: Skin is warm and dry.   Psychiatric: His mood appears anxious.   Nursing note and vitals reviewed.      ECG 12 Lead    Date/Time: 12/6/2018 9:01 PM  Performed by: Seth Mcgarry MD  Authorized by: Seth Mcgarry MD   Interpreted by physician  Rhythm: sinus rhythm  Rate: normal  BPM: 91  QRS axis: normal  Conduction: conduction normal  ST Segments: ST segments normal  T Waves: T waves normal  Other: no other findings  Clinical impression: normal ECG                 ED Course  ED Course as of Dec 06 2313   Thu Dec 06, 2018   2310 Pt with persistent bilateral expiratory wheezes/tachypnea/RR 36/respiratory distress. Labs/cxr noted. Pt given solumedrol/duoneb x3/antibiotics/magnesium IV. Will add bipap respiratory support et plan admit.  [SD]      ED Course User Index  [SD] Seth Mcgarry MD      Labs Reviewed   BASIC METABOLIC PANEL - Abnormal; Notable for the following components:       Result Value    Glucose 311 (*)     Creatinine 0.57 (*)     Sodium 128 (*)     Chloride 91 (*)     eGFR Non  Amer 158 (*)     All other components within normal limits   CBC WITH AUTO DIFFERENTIAL - Abnormal; Notable for the following components:    Immature Grans % 1.1 (*)     Monocytes, Absolute 0.94 (*)     Immature Grans, Absolute 0.10 (*)     All other components within normal limits   BLOOD GAS, ARTERIAL W/CO-OXIMETRY - Abnormal; Notable for the following components:    pH, Arterial 7.472 (*)     pO2, Arterial 75.8 (*)     HCO3, Arterial 28.5 (*)     Base Excess, Arterial 4.5 (*)     Hemoglobin, Blood Gas 13.9 (*)     Sodium, Arterial 133 (*)     Glucose, Arterial 312 (*)     All other components within normal limits   INFLUENZA ANTIGEN, RAPID - Normal   PROTIME-INR - Normal    Narrative:     Therapeutic range for most indications is 2.0-3.0 INR,  or 2.5-3.5 for mechanical heart valves.   APTT - Normal    Narrative:     The recommended Heparin therapeutic range is  68-97 seconds.   TROPONIN (IN-HOUSE) - Normal   D-DIMER, QUANTITATIVE - Normal    Narrative:     Dimer values <500 ng/ml FEU are FDA approved as aid in diagnosis of deep venous thrombosis and pulmonary embolism.  This test should not be used in an exclusion strategy with pretest probability alone.    A recent guideline regarding diagnosis for pulmonary thromboembolism recommends an adjusted exclusion criterion of age x 10 ng/ml FEU for patients >50 years of age ([employer]ori Intern Med 2015; 163: 701-711).   BNP (IN-HOUSE) - Normal   BLOOD CULTURE   BLOOD CULTURE   RESPIRATORY CULTURE   BLOOD GAS, ARTERIAL   MAGNESIUM   CBC AND DIFFERENTIAL    Narrative:     The following orders were created for panel order CBC & Differential.  Procedure                               Abnormality         Status                     ---------                               -----------         ------                     CBC Auto Differential[[id number]]        Abnormal            Final result                 Please view results for these tests on the individual orders.   EXTRA TUBES    Narrative:     The following orders were created for panel order Extra Tubes.  Procedure                               Abnormality         Status                     ---------                               -----------         ------                     Gold Top - SST[[id number]]                                   Final result                 Please view results for these tests on the individual orders.   GOLD TOP - SST     Xr Chest 2 View    Result Date: 12/6/2018  Narrative: PROCEDURE: XR CHEST 2 VW CLINICAL HISTORY: soa INDICATION: Same as above COMPARISON: 11/3/2018 TECHNIQUE: PA and and lateral chest radiographs were obtained. FINDINGS: There is minimal right lower lobe infiltrate/atelectasis  There are no pneumothoraces or pleural effusions. The pulmonary vascularity is normal The cardiomediastinal silhouette is stable.     Impression: There is minimal right  lower lobe infiltrate/atelectasis  Electronically signed by:  Gray Chery MD  12/6/2018 9:20 PM CST Workstation: -[id number]                [facility]      Final diagnoses:   Pneumonia of right middle lobe due to infectious organism (CMS/HCC)   Respiratory distress            Seth Mcgarry MD  12/06/18 1092

## 2018-12-07 NOTE — PLAN OF CARE
Problem: Patient Care Overview  Goal: Plan of Care Review  Outcome: Ongoing (interventions implemented as appropriate)   12/07/18 3742   Coping/Psychosocial   Plan of Care Reviewed With patient   Plan of Care Review   Progress no change

## 2018-12-07 NOTE — H&P
HISTORY AND PHYSICAL  NAME: Yordy Hansen  : 1980  MRN: [id number]    DATE OF ADMISSION: 18    DATE & TIME SEEN: 18 12:29 AM    PCP: Ruslan Gonzalez APRN    CODE STATUS:   Full code    CHIEF COMPLAINT shortness of air    HPI:  Yordy Hansen is a 38 y.o. male with concurrent medical history of diabetes mellitus, hypertension, hyperlipidemia, obstructive sleep apnea, presents to the emergency room for 5 days of shortness of air.  He's also been having a cough productive of green and yellow sputum.  He states pain throughout the chest with a cough or deep inspiration.  No pain otherwise.  He denies fever or chills.  He doesn't or sweats.  He has home duo nebs that which were not helping.  He does use a CPAP to sleep.  He states it's easier for him to breathe laying down and sitting up.  Denies chest pain or leg swelling.  No other symptoms or complaints at this time.    CONCURRENT MEDICAL HISTORY:  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HCC)    • Fatty liver    • GERD (gastroesophageal reflux disease)    • Hyperlipidemia    • Hypertension    • Morbid obesity (CMS/HCC)    • Pulmonary embolism (CMS/HCC)    • RLS (restless legs syndrome)    • Seizures (CMS/HCC)     last one many years ago   • Sleep apnea     c-pap       PAST SURGICAL HISTORY:  Past Surgical History:   Procedure Laterality Date   • ADENOIDECTOMY     • CHOLECYSTECTOMY      lap   • CORONARY ANGIOPLASTY WITH STENT PLACEMENT     • TONSILLECTOMY     • TRANSESOPHAGEAL ECHOCARDIOGRAM (MONICA)         FAMILY HISTORY:  Family History   Problem Relation Age of Onset   • Heart disease Mother    • Hypertension Mother    • Hyperlipidemia Mother    • Coronary artery disease Mother    • Diabetes Mother    • Obesity Mother    • Kidney failure Mother    • Sleep apnea Father    • Cancer  Paternal Grandfather         SOCIAL HISTORY:  Social History     Socioeconomic History   • Marital status:      Spouse name: Cori   • Number of children: 0   • Years of education: Not on file   • Highest education level: Not on file   Social Needs   • Financial resource strain: Not on file   • Food insecurity - worry: Not on file   • Food insecurity - inability: Not on file   • Transportation needs - medical: Not on file   • Transportation needs - non-medical: Not on file   Occupational History   • Occupation: Disabled   Tobacco Use   • Smoking status: Former Smoker     Packs/day: 0.25     Years: 0.50     Pack years: 0.12     Types: Cigarettes     Last attempt to quit:      Years since quittin.9   • Smokeless tobacco: Current User     Types: Snuff   • Tobacco comment: quit smoking 25 years ago; he does use snuff   Substance and Sexual Activity   • Alcohol use: No   • Drug use: No   • Sexual activity: Yes     Partners: Female     Birth control/protection: None   Other Topics Concern   • Not on file   Social History Narrative   • Not on file       HOME MEDICATIONS:  Prior to Admission medications    Medication Sig Start Date End Date Taking? Authorizing Provider   prasugrel (EFFIENT) 10 MG tablet Take 10 mg by mouth. 18  Yes Latrice Bolaños MD   albuterol (ACCUNEB) 1.25 MG/3ML nebulizer solution Take 3 mL by nebulization Every 6 (Six) Hours As Needed for Wheezing. 11/15/17   Tahmina Reid APRN   albuterol (PROVENTIL HFA;VENTOLIN HFA) 108 (90 BASE) MCG/ACT inhaler Inhale 2 puffs Every 4 (Four) Hours As Needed for Wheezing.    Latrice Bolaños MD   amLODIPine (NORVASC) 10 MG tablet Take 1 tablet by mouth Every Night. 11/10/17   Earnest Perez MD   BYSTOLIC 10 MG tablet  10/4/18   ProviderLatrice MD   clopidogrel (PLAVIX) 75 MG tablet Take 1 tablet by mouth Daily. 10/1/18   Yared Mcnulty MD   glucose monitor monitoring kit 1 each As Needed (check blood glucose 3x daily).  "1/22/18   Froylan Keyes APRN   insulin aspart (novoLOG) 100 UNIT/ML injection Inject 25 Units under the skin into the appropriate area as directed 3 (Three) Times a Day Before Meals.  Patient taking differently: Inject 30 Units under the skin into the appropriate area as directed 3 (Three) Times a Day Before Meals. 9/11/18   Suki Sharma APRN   insulin detemir (LEVEMIR) 100 UNIT/ML injection Inject 60 Units under the skin into the appropriate area as directed Every Night. 9/11/18   Suki Sharma APRN   isosorbide mononitrate (IMDUR) 120 MG 24 hr tablet Take 1 tablet by mouth Daily.  Patient taking differently: Take 120 mg by mouth Every Night. 9/10/18   Suki Sharma APRN   lisinopril (PRINIVIL,ZESTRIL) 40 MG tablet Take 1 tablet by mouth Every Morning.  Patient taking differently: Take 40 mg by mouth Every Night. 11/10/17   Earnest Perez MD   metoprolol succinate XL (TOPROL XL) 200 MG 24 hr tablet Take 1 tablet by mouth Daily.  Patient taking differently: Take 200 mg by mouth Every Night. 1/26/18   Froylan Keyes APRN   MICROLET LANCETS misc One touch delica lancets 4/2/18   Froylan Keyes APRN   nitroglycerin (NITROSTAT) 0.4 MG SL tablet Place 1 tablet under the tongue Every 5 (Five) Minutes As Needed for Chest Pain. Take no more than 3 doses in 15 minutes. 11/10/17   Earnest Perez MD   pantoprazole (PROTONIX) 40 MG EC tablet Take 1 tablet by mouth Every Night. 11/10/17   Earnest Perez MD   pramipexole (MIRAPEX) 1 MG tablet TAKE TWO TABLETS BY MOUTH EVERY NIGHT 6/21/18   Earnest Perez MD   ranolazine (RANEXA) 1000 MG 12 hr tablet Take 1 tablet by mouth Every 12 (Twelve) Hours. 11/10/17   Earnest Perze MD   RELION INSULIN SYRINGE 31G X 15/64\" 0.5 ML misc  1/18/18   Provider, MD Latrice   rivaroxaban (XARELTO) 20 MG tablet Take 1 tablet by mouth Daily.  Patient taking differently: Take 20 mg by mouth Daily With Dinner. 2/2/18   Letty Paige, LALA   sertraline " (ZOLOFT) 25 MG tablet Take 1 tablet by mouth Daily.  Patient taking differently: Take 25 mg by mouth Every Night. 11/10/17   Earnest Perez MD   traZODone (DESYREL) 300 MG tablet TAKE ONE TABLET BY MOUTH EVERY NIGHT 6/21/18   Earnest Perez MD       ALLERGIES:  Glipizide; Reglan [metoclopramide]; Tramadol; and Risperdal [risperidone]    REVIEW OF SYSTEMS  Review of Systems   Constitutional: Negative for activity change, appetite change, chills and fever.   HENT: Negative for congestion, ear pain and sore throat.    Eyes: Negative for redness and visual disturbance.   Respiratory: Positive for cough, chest tightness, shortness of breath and wheezing.    Cardiovascular: Positive for chest pain. Negative for palpitations and leg swelling.   Gastrointestinal: Negative for abdominal pain, diarrhea, nausea and vomiting.   Genitourinary: Negative for difficulty urinating and dysuria.   Musculoskeletal: Negative for arthralgias and gait problem.   Skin: Negative for color change and rash.   Neurological: Negative for dizziness, weakness and headaches.   Psychiatric/Behavioral: Negative for dysphoric mood and sleep disturbance. The patient is not nervous/anxious.        PHYSICAL EXAM:  Temp:  [97.6 °F (36.4 °C)-100.5 °F (38.1 °C)] 100.5 °F (38.1 °C)  Heart Rate:  [] 98  Resp:  [18-48] 35  BP: (148-185)/(67-96) 185/81  FiO2 (%):  [28 %] 28 %  Body mass index is 64.71 kg/m².  Physical Exam   Constitutional: He is oriented to person, place, and time. He appears well-developed and well-nourished.   HENT:   Head: Normocephalic and atraumatic.   Right Ear: External ear normal.   Left Ear: External ear normal.   Nose: Nose normal.   Eyes: Conjunctivae and EOM are normal. Pupils are equal, round, and reactive to light.   Neck: Normal range of motion. Neck supple.   Cardiovascular: Normal rate, regular rhythm and normal heart sounds.   Pulmonary/Chest: He is in respiratory distress. He has wheezes (throughout). He  exhibits no tenderness.   Coarse expiration R fields and lower left field, decreased breath sounds throughout   Abdominal: Soft. Bowel sounds are normal. He exhibits no distension. There is no tenderness.   Musculoskeletal: Normal range of motion. He exhibits no edema or deformity.   Neurological: He is alert and oriented to person, place, and time. No cranial nerve deficit.   Skin: Skin is warm.   Psychiatric: He has a normal mood and affect. His behavior is normal. Thought content normal.   Nursing note and vitals reviewed.      DIAGNOSTIC DATA:   Lab Results (last 24 hours)     Procedure Component Value Units Date/Time    Blood Culture - Blood, Arm, Left [[id number]] Collected:  12/06/18 2337    Specimen:  Blood from Arm, Left Updated:  12/06/18 2351    Magnesium [[id number]]  (Normal) Collected:  12/06/18 2129    Specimen:  Blood Updated:  12/06/18 2341     Magnesium 1.9 mg/dL     Respiratory Culture - Sputum, Cough [[id number]] Collected:  12/06/18 2312    Specimen:  Sputum from Cough Updated:  12/06/18 2322    Extra Tubes [[id number]] Collected:  12/06/18 2129    Specimen:  Blood, Venous Line Updated:  12/06/18 2230    Narrative:       The following orders were created for panel order Extra Tubes.  Procedure                               Abnormality         Status                     ---------                               -----------         ------                     Gold Top - SST[[id number]]                                   Final result                 Please view results for these tests on the individual orders.    Gold Top - SST [[id number]] Collected:  12/06/18 2129    Specimen:  Blood Updated:  12/06/18 2230     Extra Tube Hold for add-ons.     Comment: Auto resulted.       Troponin [[id number]]  (Normal) Collected:  12/06/18 2129    Specimen:  Blood Updated:  12/06/18 2216     Troponin I <0.012 ng/mL     BNP [[id number]]  (Normal) Collected:  12/06/18 2129    Specimen:  Blood Updated:  12/06/18 2216      proBNP 94.4 pg/mL     Basic Metabolic Panel [[id number]]  (Abnormal) Collected:  12/06/18 2129    Specimen:  Blood Updated:  12/06/18 2204     Glucose 311 mg/dL      BUN 9 mg/dL      Creatinine 0.57 mg/dL      Sodium 128 mmol/L      Potassium 3.8 mmol/L      Chloride 91 mmol/L      CO2 28.0 mmol/L      Calcium 9.1 mg/dL      eGFR Non African Amer 158 mL/min/1.73      BUN/Creatinine Ratio 15.8     Anion Gap 9.0 mmol/L     Influenza Antigen, Rapid - Swab, Nasopharynx [[id number]]  (Normal) Collected:  12/06/18 2146    Specimen:  Swab from Nasopharynx Updated:  12/06/18 2202     Influenza A Ag, EIA Negative     Influenza B Ag, EIA Negative    D-dimer, Quantitative [[id number]]  (Normal) Collected:  12/06/18 2129    Specimen:  Blood Updated:  12/06/18 2201     D-Dimer, Quantitative 363 ng/mL (FEU)     Narrative:       Dimer values <500 ng/ml FEU are FDA approved as aid in diagnosis of deep venous thrombosis and pulmonary embolism.  This test should not be used in an exclusion strategy with pretest probability alone.    A recent guideline regarding diagnosis for pulmonary thromboembolism recommends an adjusted exclusion criterion of age x 10 ng/ml FEU for patients >50 years of age ([employer]ori Intern Med 2015; 163: 701-711).    aPTT [[id number]]  (Normal) Collected:  12/06/18 2129    Specimen:  Blood Updated:  12/06/18 2159     PTT 31.9 seconds     Narrative:       The recommended Heparin therapeutic range is 68-97 seconds.    Protime-INR [[id number]]  (Normal) Collected:  12/06/18 2129    Specimen:  Blood Updated:  12/06/18 2159     Protime 12.7 Seconds      INR 0.97    Narrative:       Therapeutic range for most indications is 2.0-3.0 INR,  or 2.5-3.5 for mechanical heart valves.    CBC & Differential [[id number]] Collected:  12/06/18 2129    Specimen:  Blood Updated:  12/06/18 2143    Narrative:       The following orders were created for panel order CBC & Differential.  Procedure                               Abnormality          Status                     ---------                               -----------         ------                     CBC Auto Differential[[id number]]        Abnormal            Final result                 Please view results for these tests on the individual orders.    CBC Auto Differential [[id number]]  (Abnormal) Collected:  12/06/18 2129    Specimen:  Blood Updated:  12/06/18 2143     WBC 8.82 10*3/mm3      RBC 4.89 10*6/mm3      Hemoglobin 13.8 g/dL      Hematocrit 39.7 %      MCV 81.2 fL      MCH 28.2 pg      MCHC 34.8 g/dL      RDW 14.1 %      RDW-SD 42.0 fl      MPV 9.1 fL      Platelets 167 10*3/mm3      Neutrophil % 66.6 %      Lymphocyte % 17.9 %      Monocyte % 10.7 %      Eosinophil % 3.5 %      Basophil % 0.2 %      Immature Grans % 1.1 %      Neutrophils, Absolute 5.87 10*3/mm3      Lymphocytes, Absolute 1.58 10*3/mm3      Monocytes, Absolute 0.94 10*3/mm3      Eosinophils, Absolute 0.31 10*3/mm3      Basophils, Absolute 0.02 10*3/mm3      Immature Grans, Absolute 0.10 10*3/mm3     Blood Gas, Arterial With Co-Ox [[id number]]  (Abnormal) Collected:  12/06/18 2120    Specimen:  Arterial Blood Updated:  12/06/18 2132     Site Right Radial     Cory's Test Positive     pH, Arterial 7.472 pH units      Comment: 83 Value above reference range        pCO2, Arterial 39.0 mm Hg      pO2, Arterial 75.8 mm Hg      Comment: 84 Value below reference range        HCO3, Arterial 28.5 mmol/L      Comment: 83 Value above reference range        Base Excess, Arterial 4.5 mmol/L      Comment: 83 Value above reference range        O2 Saturation, Arterial 97.1 %      Hemoglobin, Blood Gas 13.9 g/dL      Comment: 84 Value below reference range        Hematocrit, Blood Gas 42.6 %      Oxyhemoglobin 95.6 %      Methemoglobin 0.30 %      Carboxyhemoglobin 1.2 %      A-a Gradiant -- mmHg      Comment: UNABLE TO CALCULATE        Sodium, Arterial 133 mmol/L      Comment: 84 Value below reference range        Potassium, Arterial 3.8  mmol/L      Ionized Calcium 4.87 mg/dL      Glucose, Arterial 312 mmol/L      Barometric Pressure for Blood Gas 757 mmHg      Modality Room Air     Ventilator Mode NA     Note --     Collected by rt     Comment: Meter: [id number]     :  [id number]        pH, Temp Corrected -- pH Units      pCO2, Temperature Corrected -- mm Hg      pO2, Temperature Corrected -- mm Hg            Imaging Results (last 24 hours)     Procedure Component Value Units Date/Time    XR Chest 2 View [[id number]] Collected:  12/06/18 2107     Updated:  12/06/18 2121    Narrative:       PROCEDURE: XR CHEST 2 VW    CLINICAL HISTORY:     soa    INDICATION: Same as above    COMPARISON: 11/3/2018     TECHNIQUE: PA and and lateral chest radiographs were obtained.    FINDINGS:     There is minimal right lower lobe infiltrate/atelectasis      There are no pneumothoraces or pleural effusions.    The pulmonary vascularity is normal    The cardiomediastinal silhouette is stable.      Impression:         There is minimal right lower lobe infiltrate/atelectasis      Electronically signed by:  Gray Chery MD  12/6/2018 9:20 PM CST  Workstation: [email or web address] reviewed the patient's new clinical results.    ASSESSMENT AND PLAN: This is a 38 y.o. male with:    Active Hospital Problems    Diagnosis Date Noted   • Acute respiratory failure with hypoxia (CMS/HCC) [J96.01] 12/07/2018   • Pneumonia of right lower lobe due to Chlamydia species [J16.0] 12/06/2018   • Diabetes mellitus (CMS/HCC) [E11.9] 03/30/2018   • CAD (coronary artery disease) [I25.10] 03/30/2018   • Morbid obesity (CMS/HCC) [E66.01] 03/08/2018   • Chronic pulmonary embolism (CMS/HCC) [I27.82] 04/22/2017   • Hypertension [I10]    • GERD (gastroesophageal reflux disease) [K21.9]    • Mixed hyperlipidemia [E78.2] 12/13/2016   • ADOLFO on CPAP [G47.33, Z99.89] 02/17/2016     1.  Acute hypoxic respiratory failure secondary to right lower lobe pneumonia.  Patient on BiPAP to  maintain adequate oxygenation.  We'll continue BiPAP as required.  I will give Rocephin and azithromycin.  Follow sputum cultures and atypicals.  Give duo nebs 4 times daily.  Received dose of Solu-Medrol in the ER, will continue at 60 mg twice daily.  2.  Diabetes mellitus.  Give home basal insulin and cover with high dose sliding scale  3.  CAD.  Continue home medicines: Imdur, Toprol, lisinopril, nitroglycerin as needed, Ranexa  4.  Hypertension.  Continue home antihypertensives  5.  GERD.  Continue Protonix  6.  Mixed hyperlipidemia.  Continue home statin.  7.  Morbid obesity. Body mass index is 64.71 kg/m².  Will get nutrition consult  8.  History of PE.  Continue prasugrel and xarelto    DVT prophylaxis: xarelto  Code status is   Full code  [facility] # [id number], reviewed and consistent with patient reported medications.      I discussed the patients findings and my recommendations with patient and primary care team.     Dr Crews is the attending on record at time of admission, he is aware of the patient's status and agrees with the above history and physical.          This document has been electronically signed by Santosh Newman MD on December 7, 2018 1:01 AM

## 2018-12-07 NOTE — PLAN OF CARE
Problem: Patient Care Overview  Goal: Plan of Care Review  Outcome: Ongoing (interventions implemented as appropriate)   12/07/18 0319   Coping/Psychosocial   Plan of Care Reviewed With patient   Plan of Care Review   Progress improving     Goal: Individualization and Mutuality  Outcome: Ongoing (interventions implemented as appropriate)    Goal: Discharge Needs Assessment  Outcome: Ongoing (interventions implemented as appropriate)    Goal: Interprofessional Rounds/Family Conf  Outcome: Ongoing (interventions implemented as appropriate)      Problem: Skin Injury Risk (Adult)  Goal: Identify Related Risk Factors and Signs and Symptoms  Outcome: Ongoing (interventions implemented as appropriate)    Goal: Skin Health and Integrity  Outcome: Ongoing (interventions implemented as appropriate)      Problem: Fall Risk (Adult)  Goal: Identify Related Risk Factors and Signs and Symptoms  Outcome: Ongoing (interventions implemented as appropriate)    Goal: Absence of Fall  Outcome: Ongoing (interventions implemented as appropriate)

## 2018-12-07 NOTE — ED NOTES
"Patient arrived to ED with complaint of shortness of breath. He states this has been going on for \"about a week\".      Susan Mckee RN  12/06/18 4188    "

## 2018-12-07 NOTE — ED NOTES
I was not able to obtain the second set of blood cultures at this time.     Estrella Doshi  12/07/18 0106

## 2018-12-08 LAB
ALBUMIN SERPL-MCNC: 3.2 G/DL (ref 3.4–4.8)
ALBUMIN/GLOB SERPL: 1 G/DL (ref 1.1–1.8)
ALP SERPL-CCNC: 77 U/L (ref 38–126)
ALT SERPL W P-5'-P-CCNC: 27 U/L (ref 21–72)
ANION GAP SERPL CALCULATED.3IONS-SCNC: 9 MMOL/L (ref 5–15)
AST SERPL-CCNC: 21 U/L (ref 17–59)
BASOPHILS # BLD AUTO: 0.01 10*3/MM3 (ref 0–0.2)
BASOPHILS NFR BLD AUTO: 0.1 % (ref 0–2)
BILIRUB SERPL-MCNC: 0.4 MG/DL (ref 0.2–1.3)
BUN BLD-MCNC: 24 MG/DL (ref 7–21)
BUN/CREAT SERPL: 30.8 (ref 7–25)
CALCIUM SPEC-SCNC: 9 MG/DL (ref 8.4–10.2)
CHLORIDE SERPL-SCNC: 99 MMOL/L (ref 95–110)
CO2 SERPL-SCNC: 24 MMOL/L (ref 22–31)
CREAT BLD-MCNC: 0.78 MG/DL (ref 0.7–1.3)
DEPRECATED RDW RBC AUTO: 44.2 FL (ref 35.1–43.9)
EOSINOPHIL # BLD AUTO: 0.02 10*3/MM3 (ref 0–0.7)
EOSINOPHIL NFR BLD AUTO: 0.2 % (ref 0–7)
ERYTHROCYTE [DISTWIDTH] IN BLOOD BY AUTOMATED COUNT: 14.4 % (ref 11.5–14.5)
GFR SERPL CREATININE-BSD FRML MDRD: 110 ML/MIN/1.73 (ref 63–147)
GLOBULIN UR ELPH-MCNC: 3.2 GM/DL (ref 2.3–3.5)
GLUCOSE BLD-MCNC: 400 MG/DL (ref 60–100)
GLUCOSE BLDC GLUCOMTR-MCNC: 372 MG/DL (ref 70–130)
GLUCOSE BLDC GLUCOMTR-MCNC: 400 MG/DL (ref 70–130)
HCT VFR BLD AUTO: 37.3 % (ref 39–49)
HGB BLD-MCNC: 12.3 G/DL (ref 13.7–17.3)
IMM GRANULOCYTES # BLD: 0.18 10*3/MM3 (ref 0–0.02)
IMM GRANULOCYTES NFR BLD: 1.4 % (ref 0–0.5)
LYMPHOCYTES # BLD AUTO: 1.15 10*3/MM3 (ref 0.6–4.2)
LYMPHOCYTES NFR BLD AUTO: 9.2 % (ref 10–50)
MCH RBC QN AUTO: 27.3 PG (ref 26.5–34)
MCHC RBC AUTO-ENTMCNC: 33 G/DL (ref 31.5–36.3)
MCV RBC AUTO: 82.9 FL (ref 80–98)
MONOCYTES # BLD AUTO: 0.71 10*3/MM3 (ref 0–0.9)
MONOCYTES NFR BLD AUTO: 5.7 % (ref 0–12)
NEUTROPHILS # BLD AUTO: 10.49 10*3/MM3 (ref 2–8.6)
NEUTROPHILS NFR BLD AUTO: 83.4 % (ref 37–80)
NRBC BLD MANUAL-RTO: 0 /100 WBC (ref 0–0)
PLATELET # BLD AUTO: 235 10*3/MM3 (ref 150–450)
PMV BLD AUTO: 9.1 FL (ref 8–12)
POTASSIUM BLD-SCNC: 5.4 MMOL/L (ref 3.5–5.1)
PROT SERPL-MCNC: 6.4 G/DL (ref 6.3–8.6)
RBC # BLD AUTO: 4.5 10*6/MM3 (ref 4.37–5.74)
SODIUM BLD-SCNC: 132 MMOL/L (ref 137–145)
WBC NRBC COR # BLD: 12.56 10*3/MM3 (ref 3.2–9.8)

## 2018-12-08 PROCEDURE — 25010000002 AZITHROMYCIN PER 500 MG: Performed by: STUDENT IN AN ORGANIZED HEALTH CARE EDUCATION/TRAINING PROGRAM

## 2018-12-08 PROCEDURE — 25010000002 METHYLPREDNISOLONE PER 40 MG: Performed by: FAMILY MEDICINE

## 2018-12-08 PROCEDURE — 63710000001 INSULIN DETEMIR PER 5 UNITS: Performed by: STUDENT IN AN ORGANIZED HEALTH CARE EDUCATION/TRAINING PROGRAM

## 2018-12-08 PROCEDURE — 63710000001 INSULIN ASPART PER 5 UNITS: Performed by: NURSE PRACTITIONER

## 2018-12-08 PROCEDURE — 85025 COMPLETE CBC W/AUTO DIFF WBC: CPT | Performed by: STUDENT IN AN ORGANIZED HEALTH CARE EDUCATION/TRAINING PROGRAM

## 2018-12-08 PROCEDURE — 63710000001 INSULIN ASPART PER 5 UNITS: Performed by: STUDENT IN AN ORGANIZED HEALTH CARE EDUCATION/TRAINING PROGRAM

## 2018-12-08 PROCEDURE — 93010 ELECTROCARDIOGRAM REPORT: CPT | Performed by: INTERNAL MEDICINE

## 2018-12-08 PROCEDURE — 80053 COMPREHEN METABOLIC PANEL: CPT | Performed by: STUDENT IN AN ORGANIZED HEALTH CARE EDUCATION/TRAINING PROGRAM

## 2018-12-08 PROCEDURE — 63710000001 INSULIN ASPART PER 5 UNITS: Performed by: INTERNAL MEDICINE

## 2018-12-08 PROCEDURE — 36415 COLL VENOUS BLD VENIPUNCTURE: CPT | Performed by: STUDENT IN AN ORGANIZED HEALTH CARE EDUCATION/TRAINING PROGRAM

## 2018-12-08 PROCEDURE — 94760 N-INVAS EAR/PLS OXIMETRY 1: CPT

## 2018-12-08 PROCEDURE — 82962 GLUCOSE BLOOD TEST: CPT

## 2018-12-08 PROCEDURE — 25010000002 CEFTRIAXONE PER 250 MG: Performed by: STUDENT IN AN ORGANIZED HEALTH CARE EDUCATION/TRAINING PROGRAM

## 2018-12-08 PROCEDURE — 94799 UNLISTED PULMONARY SVC/PX: CPT

## 2018-12-08 PROCEDURE — 93005 ELECTROCARDIOGRAM TRACING: CPT | Performed by: NURSE PRACTITIONER

## 2018-12-08 RX ORDER — SODIUM POLYSTYRENE SULFONATE 15 G/60ML
30 SUSPENSION ORAL; RECTAL ONCE
Status: COMPLETED | OUTPATIENT
Start: 2018-12-08 | End: 2018-12-08

## 2018-12-08 RX ADMIN — IPRATROPIUM BROMIDE AND ALBUTEROL SULFATE 3 ML: 2.5; .5 SOLUTION RESPIRATORY (INHALATION) at 10:24

## 2018-12-08 RX ADMIN — IPRATROPIUM BROMIDE AND ALBUTEROL SULFATE 3 ML: 2.5; .5 SOLUTION RESPIRATORY (INHALATION) at 15:20

## 2018-12-08 RX ADMIN — SERTRALINE HYDROCHLORIDE 25 MG: 25 TABLET ORAL at 21:05

## 2018-12-08 RX ADMIN — CEFTRIAXONE SODIUM 1 G: 1 INJECTION, POWDER, FOR SOLUTION INTRAMUSCULAR; INTRAVENOUS at 06:01

## 2018-12-08 RX ADMIN — SODIUM CHLORIDE 125 ML/HR: 9 INJECTION, SOLUTION INTRAVENOUS at 23:12

## 2018-12-08 RX ADMIN — INSULIN ASPART 25 UNITS: 100 INJECTION, SOLUTION INTRAVENOUS; SUBCUTANEOUS at 13:16

## 2018-12-08 RX ADMIN — RANOLAZINE 1000 MG: 500 TABLET, FILM COATED, EXTENDED RELEASE ORAL at 09:35

## 2018-12-08 RX ADMIN — METHYLPREDNISOLONE SODIUM SUCCINATE 40 MG: 40 INJECTION, POWDER, FOR SOLUTION INTRAMUSCULAR; INTRAVENOUS at 16:50

## 2018-12-08 RX ADMIN — AMLODIPINE BESYLATE 10 MG: 10 TABLET ORAL at 21:03

## 2018-12-08 RX ADMIN — PRAMIPEXOLE DIHYDROCHLORIDE 1 MG: 1 TABLET ORAL at 21:07

## 2018-12-08 RX ADMIN — SODIUM POLYSTYRENE SULFONATE 30 G: 15 SUSPENSION ORAL; RECTAL at 09:47

## 2018-12-08 RX ADMIN — NEBIVOLOL HYDROCHLORIDE 5 MG: 5 TABLET ORAL at 21:07

## 2018-12-08 RX ADMIN — INSULIN ASPART 20 UNITS: 100 INJECTION, SOLUTION INTRAVENOUS; SUBCUTANEOUS at 09:35

## 2018-12-08 RX ADMIN — SODIUM CHLORIDE 125 ML/HR: 9 INJECTION, SOLUTION INTRAVENOUS at 09:39

## 2018-12-08 RX ADMIN — SODIUM CHLORIDE 125 ML/HR: 9 INJECTION, SOLUTION INTRAVENOUS at 16:50

## 2018-12-08 RX ADMIN — INSULIN DETEMIR 60 UNITS: 100 INJECTION, SOLUTION SUBCUTANEOUS at 21:01

## 2018-12-08 RX ADMIN — AZITHROMYCIN MONOHYDRATE 500 MG: 500 INJECTION, POWDER, LYOPHILIZED, FOR SOLUTION INTRAVENOUS at 07:15

## 2018-12-08 RX ADMIN — PRASUGREL HYDROCHLORIDE 10 MG: 10 TABLET, FILM COATED ORAL at 09:35

## 2018-12-08 RX ADMIN — INSULIN ASPART 20 UNITS: 100 INJECTION, SOLUTION INTRAVENOUS; SUBCUTANEOUS at 17:26

## 2018-12-08 RX ADMIN — METHYLPREDNISOLONE SODIUM SUCCINATE 40 MG: 40 INJECTION, POWDER, FOR SOLUTION INTRAMUSCULAR; INTRAVENOUS at 05:59

## 2018-12-08 RX ADMIN — ISOSORBIDE MONONITRATE 120 MG: 60 TABLET, EXTENDED RELEASE ORAL at 21:04

## 2018-12-08 RX ADMIN — INSULIN ASPART 30 UNITS: 100 INJECTION, SOLUTION INTRAVENOUS; SUBCUTANEOUS at 17:27

## 2018-12-08 RX ADMIN — INSULIN ASPART 24 UNITS: 100 INJECTION, SOLUTION INTRAVENOUS; SUBCUTANEOUS at 13:16

## 2018-12-08 RX ADMIN — RIVAROXABAN 20 MG: 10 TABLET, FILM COATED ORAL at 17:26

## 2018-12-08 RX ADMIN — LISINOPRIL 40 MG: 40 TABLET ORAL at 21:07

## 2018-12-08 RX ADMIN — INSULIN ASPART 25 UNITS: 100 INJECTION, SOLUTION INTRAVENOUS; SUBCUTANEOUS at 09:39

## 2018-12-08 RX ADMIN — PANTOPRAZOLE SODIUM 40 MG: 20 TABLET, DELAYED RELEASE ORAL at 21:05

## 2018-12-08 RX ADMIN — TRAZODONE HYDROCHLORIDE 300 MG: 150 TABLET ORAL at 21:05

## 2018-12-08 RX ADMIN — RANOLAZINE 1000 MG: 500 TABLET, FILM COATED, EXTENDED RELEASE ORAL at 21:04

## 2018-12-08 RX ADMIN — METHYLPREDNISOLONE SODIUM SUCCINATE 40 MG: 40 INJECTION, POWDER, FOR SOLUTION INTRAMUSCULAR; INTRAVENOUS at 23:12

## 2018-12-08 RX ADMIN — INSULIN ASPART 20 UNITS: 100 INJECTION, SOLUTION INTRAVENOUS; SUBCUTANEOUS at 21:09

## 2018-12-08 RX ADMIN — NEBIVOLOL HYDROCHLORIDE 5 MG: 5 TABLET ORAL at 09:35

## 2018-12-08 RX ADMIN — BUDESONIDE AND FORMOTEROL FUMARATE DIHYDRATE 2 PUFF: 160; 4.5 AEROSOL RESPIRATORY (INHALATION) at 10:25

## 2018-12-08 NOTE — PLAN OF CARE
Problem: Patient Care Overview  Goal: Plan of Care Review  Outcome: Ongoing (interventions implemented as appropriate)   12/08/18 5273   Plan of Care Review   Progress no change   OTHER   Outcome Summary Pt resting at this time; vital signs stable, blood glucose elevated >400 at HS, sliding scale insulin given, Dr Crews notified of blood glucose; pt independent with transfers, on bipap at this time; monitoring      Goal: Individualization and Mutuality  Outcome: Ongoing (interventions implemented as appropriate)    Goal: Discharge Needs Assessment  Outcome: Ongoing (interventions implemented as appropriate)      Problem: Skin Injury Risk (Adult)  Goal: Skin Health and Integrity  Outcome: Ongoing (interventions implemented as appropriate)      Problem: Fall Risk (Adult)  Goal: Absence of Fall  Outcome: Ongoing (interventions implemented as appropriate)      Problem: Pneumonia (Adult)  Goal: Signs and Symptoms of Listed Potential Problems Will be Absent, Minimized or Managed (Pneumonia)  Outcome: Ongoing (interventions implemented as appropriate)

## 2018-12-08 NOTE — PROGRESS NOTES
[facility] Medicine Services  INPATIENT PROGRESS NOTE    Length of Stay: 2  Date of Admission: 12/6/2018  Primary Care Physician: Ruslan Gonzalez APRN    Subjective   Chief Complaint:  No complaints.    HPI:  This is a 38-year-old  male with past medical history of diabetes, hypertension, hyperlipidemia, ADOLFO and GERD that presented to [facility] on 12/6/2018 with complaints of shortness of air ×5 days.  He also had complaints of a productive cough of green and yellow sputum.  Chest x-ray indicates a minimal right lower lobe infiltrates/atelectasis.  Atypicals and influenza negative.  Patient has been started on Rocephin and azithromycin.    Review of Systems   Constitutional: Negative for activity change and fatigue.   HENT: Negative for ear pain and sore throat.    Eyes: Negative for pain and discharge.   Respiratory: Positive for cough and shortness of breath.    Cardiovascular: Negative for chest pain and palpitations.   Gastrointestinal: Negative for abdominal pain and nausea.   Endocrine: Negative for cold intolerance and heat intolerance.   Genitourinary: Negative for difficulty urinating and dysuria.   Musculoskeletal: Negative for arthralgias and gait problem.   Skin: Negative for color change and rash.   Neurological: Negative for dizziness and weakness.   Psychiatric/Behavioral: Negative for agitation and confusion.        Objective    Temp:  [96.8 °F (36 °C)-98.4 °F (36.9 °C)] 96.8 °F (36 °C)  Heart Rate:  [63-83] 65  Resp:  [16-21] 21  BP: ()/(51-60) 108/52    Physical Exam   Constitutional: He is oriented to person, place, and time. He appears well-developed and well-nourished.   HENT:   Head: Normocephalic and atraumatic.   Eyes: EOM are normal. Pupils are equal, round, and reactive to light.   Neck: Normal range of motion. Neck supple.   Cardiovascular: Normal rate and regular rhythm.   Pulmonary/Chest: Effort normal and breath sounds normal.    Abdominal: Soft. Bowel sounds are normal.   Musculoskeletal: Normal range of motion.   Neurological: He is alert and oriented to person, place, and time.   Skin: Skin is warm and dry.   Psychiatric: He has a normal mood and affect. His behavior is normal.     Results Review:  I have reviewed the labs, radiology results, and diagnostic studies.    Laboratory Data:   Results from last 7 days   Lab Units  12/08/18   0517 12/07/18 2028 12/07/18 0836 12/06/18 2129   SODIUM mmol/L  132*   --   133*  128*   POTASSIUM mmol/L  5.4*   --   4.5  3.8   CHLORIDE mmol/L  99   --   93*  91*   CO2 mmol/L  24.0   --   24.0  28.0   BUN mg/dL  24*   --   14  9   CREATININE mg/dL  0.78   --   0.57*  0.57*   GLUCOSE mg/dL  400*  413*  486*  311*   CALCIUM mg/dL  9.0   --   9.3  9.1   BILIRUBIN mg/dL  0.4   --   0.7   --    ALK PHOS U/L  77   --   102   --    ALT (SGPT) U/L  27   --   25   --    AST (SGOT) U/L  21   --   30   --    ANION GAP mmol/L  9.0   --   16.0*  9.0     Estimated Creatinine Clearance: 227.9 mL/min (by C-G formula based on SCr of 0.78 mg/dL).  Results from last 7 days   Lab Units  12/06/18 2129   MAGNESIUM mg/dL  1.9         Results from last 7 days   Lab Units  12/08/18 0517 12/07/18 0836  12/06/18 2129   WBC 10*3/mm3  12.56*  11.52*  8.82   HEMOGLOBIN g/dL  12.3*  14.3  13.8   HEMATOCRIT %  37.3*  42.5  39.7   PLATELETS 10*3/mm3  235  189  167     Results from last 7 days   Lab Units  12/06/18 2129   INR   0.97       Culture Data:   Blood Culture   Date Value Ref Range Status   12/07/2018 No growth at 24 hours  Preliminary   12/06/2018 No growth at 24 hours  Preliminary     No results found for: URINECX  Respiratory Culture   Date Value Ref Range Status   12/06/2018 Normal Respiratory Radha  Preliminary     No results found for: WOUNDCX  No results found for: STOOLCX  No components found for: BODYFLD    Radiology Data:   Imaging Results (last 24 hours)     ** No results found for the last 24  hours. **          I have reviewed the patient's current medications.     Assessment/Plan     Active Hospital Problems    Diagnosis   • Acute respiratory failure with hypoxia (CMS/HCC)   • Pneumonia of right lower lobe due to Chlamydia species   • Diabetes mellitus (CMS/HCC)   • CAD (coronary artery disease)   • Morbid obesity (CMS/HCC)   • Chronic pulmonary embolism (CMS/HCC)   • Hypertension   • GERD (gastroesophageal reflux disease)   • Mixed hyperlipidemia   • ADOLFO on CPAP       Plan:    1.  Acute hypoxic respiratory failure secondary to right lower lobe pneumonia:  Atypicals negative.  Blood cultures negative.  Continue nebulizers and IV steroids.  Continue azithromycin and Rocephin.  2.  Diabetes mellitus, type II:  We'll increase his NovoLog from 25 units to 30 units with each meal as blood sugars elevated secondary to steroids.  3. CAD.  Continue home medicines: Imdur, Toprol, lisinopril, nitroglycerin as needed, Ranexa  4.  Hypertension.  Continue home antihypertensives  5.  GERD.  Continue Protonix  6.  Mixed hyperlipidemia.  Continue home statin.  7.  Morbid obesity. Body mass index is 64.71 kg/m².  Will get nutrition consult  8.  History of PE.  Continue prasugrel and xarelto  9.  Deconditioning: PT/OT.      Discharge Planning: I expect patient to be discharged to home in 1-2 days.      This document has been electronically signed by LALA Carvajal on December 8, 2018 2:12 PM

## 2018-12-09 ENCOUNTER — APPOINTMENT (OUTPATIENT)
Dept: CT IMAGING | Facility: HOSPITAL | Age: 38
End: 2018-12-09

## 2018-12-09 LAB
ALBUMIN SERPL-MCNC: 3.1 G/DL (ref 3.4–4.8)
ALBUMIN/GLOB SERPL: 1.1 G/DL (ref 1.1–1.8)
ALP SERPL-CCNC: 70 U/L (ref 38–126)
ALT SERPL W P-5'-P-CCNC: 21 U/L (ref 21–72)
ANION GAP SERPL CALCULATED.3IONS-SCNC: 9 MMOL/L (ref 5–15)
AST SERPL-CCNC: 14 U/L (ref 17–59)
BACTERIA SPEC AEROBE CULT: ABNORMAL
BACTERIA SPEC RESP CULT: NORMAL
BASOPHILS # BLD AUTO: 0.03 10*3/MM3 (ref 0–0.2)
BASOPHILS NFR BLD AUTO: 0.2 % (ref 0–2)
BILIRUB SERPL-MCNC: 0.2 MG/DL (ref 0.2–1.3)
BUN BLD-MCNC: 17 MG/DL (ref 7–21)
BUN/CREAT SERPL: 26.2 (ref 7–25)
CALCIUM SPEC-SCNC: 8.5 MG/DL (ref 8.4–10.2)
CHLORIDE SERPL-SCNC: 101 MMOL/L (ref 95–110)
CO2 SERPL-SCNC: 23 MMOL/L (ref 22–31)
CREAT BLD-MCNC: 0.65 MG/DL (ref 0.7–1.3)
DEPRECATED RDW RBC AUTO: 45.9 FL (ref 35.1–43.9)
EOSINOPHIL # BLD AUTO: 0.01 10*3/MM3 (ref 0–0.7)
EOSINOPHIL NFR BLD AUTO: 0.1 % (ref 0–7)
ERYTHROCYTE [DISTWIDTH] IN BLOOD BY AUTOMATED COUNT: 14.7 % (ref 11.5–14.5)
GFR SERPL CREATININE-BSD FRML MDRD: 136 ML/MIN/1.73 (ref 63–147)
GLOBULIN UR ELPH-MCNC: 2.9 GM/DL (ref 2.3–3.5)
GLUCOSE BLD-MCNC: 353 MG/DL (ref 60–100)
GLUCOSE BLDC GLUCOMTR-MCNC: 252 MG/DL (ref 70–130)
GLUCOSE BLDC GLUCOMTR-MCNC: 304 MG/DL (ref 70–130)
GLUCOSE BLDC GLUCOMTR-MCNC: 369 MG/DL (ref 70–130)
GLUCOSE BLDC GLUCOMTR-MCNC: 389 MG/DL (ref 70–130)
GLUCOSE BLDC GLUCOMTR-MCNC: 397 MG/DL (ref 70–130)
GLUCOSE BLDC GLUCOMTR-MCNC: 413 MG/DL (ref 70–130)
GLUCOSE BLDC GLUCOMTR-MCNC: 416 MG/DL (ref 70–130)
GLUCOSE BLDC GLUCOMTR-MCNC: 418 MG/DL (ref 70–130)
GLUCOSE BLDC GLUCOMTR-MCNC: 472 MG/DL (ref 70–130)
GRAM STN SPEC: ABNORMAL
GRAM STN SPEC: NORMAL
HCT VFR BLD AUTO: 38.8 % (ref 39–49)
HGB BLD-MCNC: 12.5 G/DL (ref 13.7–17.3)
IMM GRANULOCYTES # BLD: 0.27 10*3/MM3 (ref 0–0.02)
IMM GRANULOCYTES NFR BLD: 1.8 % (ref 0–0.5)
LYMPHOCYTES # BLD AUTO: 1.19 10*3/MM3 (ref 0.6–4.2)
LYMPHOCYTES NFR BLD AUTO: 7.8 % (ref 10–50)
MCH RBC QN AUTO: 27.4 PG (ref 26.5–34)
MCHC RBC AUTO-ENTMCNC: 32.2 G/DL (ref 31.5–36.3)
MCV RBC AUTO: 84.9 FL (ref 80–98)
MONOCYTES # BLD AUTO: 0.81 10*3/MM3 (ref 0–0.9)
MONOCYTES NFR BLD AUTO: 5.3 % (ref 0–12)
NEUTROPHILS # BLD AUTO: 13.01 10*3/MM3 (ref 2–8.6)
NEUTROPHILS NFR BLD AUTO: 84.8 % (ref 37–80)
NRBC BLD MANUAL-RTO: 0 /100 WBC (ref 0–0)
PLATELET # BLD AUTO: 227 10*3/MM3 (ref 150–450)
PMV BLD AUTO: 9.4 FL (ref 8–12)
POTASSIUM BLD-SCNC: 4.6 MMOL/L (ref 3.5–5.1)
PROT SERPL-MCNC: 6 G/DL (ref 6.3–8.6)
RBC # BLD AUTO: 4.57 10*6/MM3 (ref 4.37–5.74)
SODIUM BLD-SCNC: 133 MMOL/L (ref 137–145)
WBC NRBC COR # BLD: 15.32 10*3/MM3 (ref 3.2–9.8)

## 2018-12-09 PROCEDURE — 80053 COMPREHEN METABOLIC PANEL: CPT | Performed by: STUDENT IN AN ORGANIZED HEALTH CARE EDUCATION/TRAINING PROGRAM

## 2018-12-09 PROCEDURE — 63710000001 INSULIN ASPART PER 5 UNITS: Performed by: NURSE PRACTITIONER

## 2018-12-09 PROCEDURE — 36415 COLL VENOUS BLD VENIPUNCTURE: CPT | Performed by: STUDENT IN AN ORGANIZED HEALTH CARE EDUCATION/TRAINING PROGRAM

## 2018-12-09 PROCEDURE — 25010000002 CEFTRIAXONE PER 250 MG: Performed by: STUDENT IN AN ORGANIZED HEALTH CARE EDUCATION/TRAINING PROGRAM

## 2018-12-09 PROCEDURE — 63710000001 INSULIN DETEMIR PER 5 UNITS: Performed by: STUDENT IN AN ORGANIZED HEALTH CARE EDUCATION/TRAINING PROGRAM

## 2018-12-09 PROCEDURE — 25010000002 AZITHROMYCIN PER 500 MG: Performed by: STUDENT IN AN ORGANIZED HEALTH CARE EDUCATION/TRAINING PROGRAM

## 2018-12-09 PROCEDURE — 85025 COMPLETE CBC W/AUTO DIFF WBC: CPT | Performed by: STUDENT IN AN ORGANIZED HEALTH CARE EDUCATION/TRAINING PROGRAM

## 2018-12-09 PROCEDURE — 82962 GLUCOSE BLOOD TEST: CPT

## 2018-12-09 PROCEDURE — 94760 N-INVAS EAR/PLS OXIMETRY 1: CPT

## 2018-12-09 PROCEDURE — 25010000002 IOPAMIDOL 61 % SOLUTION: Performed by: INTERNAL MEDICINE

## 2018-12-09 PROCEDURE — 71260 CT THORAX DX C+: CPT

## 2018-12-09 PROCEDURE — 25010000002 METHYLPREDNISOLONE PER 40 MG: Performed by: FAMILY MEDICINE

## 2018-12-09 PROCEDURE — 63710000001 INSULIN ASPART PER 5 UNITS: Performed by: STUDENT IN AN ORGANIZED HEALTH CARE EDUCATION/TRAINING PROGRAM

## 2018-12-09 PROCEDURE — 94799 UNLISTED PULMONARY SVC/PX: CPT

## 2018-12-09 RX ORDER — METHYLPREDNISOLONE SODIUM SUCCINATE 40 MG/ML
20 INJECTION, POWDER, LYOPHILIZED, FOR SOLUTION INTRAMUSCULAR; INTRAVENOUS EVERY 12 HOURS
Status: DISCONTINUED | OUTPATIENT
Start: 2018-12-10 | End: 2018-12-10 | Stop reason: HOSPADM

## 2018-12-09 RX ADMIN — PANTOPRAZOLE SODIUM 40 MG: 20 TABLET, DELAYED RELEASE ORAL at 20:41

## 2018-12-09 RX ADMIN — BUDESONIDE AND FORMOTEROL FUMARATE DIHYDRATE 2 PUFF: 160; 4.5 AEROSOL RESPIRATORY (INHALATION) at 07:31

## 2018-12-09 RX ADMIN — INSULIN ASPART 12 UNITS: 100 INJECTION, SOLUTION INTRAVENOUS; SUBCUTANEOUS at 20:45

## 2018-12-09 RX ADMIN — INSULIN ASPART 35 UNITS: 100 INJECTION, SOLUTION INTRAVENOUS; SUBCUTANEOUS at 18:01

## 2018-12-09 RX ADMIN — TRAZODONE HYDROCHLORIDE 300 MG: 150 TABLET ORAL at 20:41

## 2018-12-09 RX ADMIN — INSULIN ASPART 20 UNITS: 100 INJECTION, SOLUTION INTRAVENOUS; SUBCUTANEOUS at 09:28

## 2018-12-09 RX ADMIN — SERTRALINE HYDROCHLORIDE 25 MG: 25 TABLET ORAL at 20:41

## 2018-12-09 RX ADMIN — ISOSORBIDE MONONITRATE 120 MG: 60 TABLET, EXTENDED RELEASE ORAL at 20:40

## 2018-12-09 RX ADMIN — NEBIVOLOL HYDROCHLORIDE 5 MG: 5 TABLET ORAL at 09:31

## 2018-12-09 RX ADMIN — NEBIVOLOL HYDROCHLORIDE 5 MG: 5 TABLET ORAL at 20:40

## 2018-12-09 RX ADMIN — RANOLAZINE 1000 MG: 500 TABLET, FILM COATED, EXTENDED RELEASE ORAL at 20:41

## 2018-12-09 RX ADMIN — RANOLAZINE 1000 MG: 500 TABLET, FILM COATED, EXTENDED RELEASE ORAL at 09:31

## 2018-12-09 RX ADMIN — INSULIN ASPART 15 UNITS: 100 INJECTION, SOLUTION INTRAVENOUS; SUBCUTANEOUS at 18:00

## 2018-12-09 RX ADMIN — INSULIN ASPART 24 UNITS: 100 INJECTION, SOLUTION INTRAVENOUS; SUBCUTANEOUS at 13:24

## 2018-12-09 RX ADMIN — RIVAROXABAN 20 MG: 10 TABLET, FILM COATED ORAL at 18:03

## 2018-12-09 RX ADMIN — IPRATROPIUM BROMIDE AND ALBUTEROL SULFATE 3 ML: 2.5; .5 SOLUTION RESPIRATORY (INHALATION) at 07:30

## 2018-12-09 RX ADMIN — AMLODIPINE BESYLATE 10 MG: 10 TABLET ORAL at 20:41

## 2018-12-09 RX ADMIN — INSULIN ASPART 35 UNITS: 100 INJECTION, SOLUTION INTRAVENOUS; SUBCUTANEOUS at 13:24

## 2018-12-09 RX ADMIN — METHYLPREDNISOLONE SODIUM SUCCINATE 40 MG: 40 INJECTION, POWDER, FOR SOLUTION INTRAMUSCULAR; INTRAVENOUS at 05:48

## 2018-12-09 RX ADMIN — IPRATROPIUM BROMIDE AND ALBUTEROL SULFATE 3 ML: 2.5; .5 SOLUTION RESPIRATORY (INHALATION) at 19:58

## 2018-12-09 RX ADMIN — PRAMIPEXOLE DIHYDROCHLORIDE 1 MG: 1 TABLET ORAL at 20:40

## 2018-12-09 RX ADMIN — METHYLPREDNISOLONE SODIUM SUCCINATE 40 MG: 40 INJECTION, POWDER, FOR SOLUTION INTRAMUSCULAR; INTRAVENOUS at 16:32

## 2018-12-09 RX ADMIN — CEFTRIAXONE SODIUM 1 G: 1 INJECTION, POWDER, FOR SOLUTION INTRAMUSCULAR; INTRAVENOUS at 06:06

## 2018-12-09 RX ADMIN — INSULIN DETEMIR 60 UNITS: 100 INJECTION, SOLUTION SUBCUTANEOUS at 20:44

## 2018-12-09 RX ADMIN — LISINOPRIL 40 MG: 40 TABLET ORAL at 20:41

## 2018-12-09 RX ADMIN — PRASUGREL HYDROCHLORIDE 10 MG: 10 TABLET, FILM COATED ORAL at 09:31

## 2018-12-09 RX ADMIN — AZITHROMYCIN MONOHYDRATE 500 MG: 500 INJECTION, POWDER, LYOPHILIZED, FOR SOLUTION INTRAVENOUS at 07:41

## 2018-12-09 RX ADMIN — SODIUM CHLORIDE 125 ML/HR: 9 INJECTION, SOLUTION INTRAVENOUS at 18:03

## 2018-12-09 RX ADMIN — SODIUM CHLORIDE 125 ML/HR: 9 INJECTION, SOLUTION INTRAVENOUS at 09:32

## 2018-12-09 RX ADMIN — INSULIN ASPART 30 UNITS: 100 INJECTION, SOLUTION INTRAVENOUS; SUBCUTANEOUS at 09:29

## 2018-12-09 RX ADMIN — IOPAMIDOL 100 ML: 612 INJECTION, SOLUTION INTRAVENOUS at 13:40

## 2018-12-09 NOTE — SIGNIFICANT NOTE
12/09/18 1559   Rehab Time/Intention   Evaluation Not Performed patient/family declined evaluation  (OT initial evaluation attempted this date, educated pt on role of OT. Pt declined skilled OT evaluation reporting he is at his baseline and has no concerns at this time. )   Rehab Treatment   Discipline occupational therapist

## 2018-12-09 NOTE — PLAN OF CARE
Problem: Patient Care Overview  Goal: Plan of Care Review  Outcome: Ongoing (interventions implemented as appropriate)   12/09/18 0256   Plan of Care Review   Progress no change   OTHER   Outcome Summary Pt resting in bed at this time; vital signs stable, blood glucose remains elevated, sliding scale insulin administered; pt on room air, sleeps on bipap; independent for ADL's , vital signs stable, monitoring pt      Goal: Individualization and Mutuality  Outcome: Ongoing (interventions implemented as appropriate)    Goal: Discharge Needs Assessment  Outcome: Ongoing (interventions implemented as appropriate)      Problem: Skin Injury Risk (Adult)  Goal: Skin Health and Integrity  Outcome: Ongoing (interventions implemented as appropriate)      Problem: Fall Risk (Adult)  Goal: Absence of Fall  Outcome: Ongoing (interventions implemented as appropriate)

## 2018-12-09 NOTE — SIGNIFICANT NOTE
12/09/18 0956   Rehab Time/Intention   Evaluation Not Performed patient/family declined evaluation  (patient states he does not need PT; been up on own in [place] per RN; Lavonne aware/approved; will complete order )   Rehab Treatment   Discipline physical therapist

## 2018-12-09 NOTE — PROGRESS NOTES
[facility] Medicine Services  INPATIENT PROGRESS NOTE    Length of Stay: 3  Date of Admission: 12/6/2018  Primary Care Physician: Ruslan Gonzalez APRN    Subjective   Chief Complaint:  No complaints.    HPI:      12/9/2018:  Patient has no complaints today.      This is a 38-year-old  male with past medical history of diabetes, hypertension, hyperlipidemia, ADOLFO and GERD that presented to [facility] on 12/6/2018 with complaints of shortness of air ×5 days.  He also had complaints of a productive cough of green and yellow sputum.  Chest x-ray indicates a minimal right lower lobe infiltrates/atelectasis.  Atypicals and influenza negative.  Patient has been started on Rocephin and azithromycin.    Review of Systems   Constitutional: Negative for activity change and fatigue.   HENT: Negative for ear pain and sore throat.    Eyes: Negative for pain and discharge.   Respiratory: Positive for cough and shortness of breath.    Cardiovascular: Negative for chest pain and palpitations.   Gastrointestinal: Negative for abdominal pain and nausea.   Endocrine: Negative for cold intolerance and heat intolerance.   Genitourinary: Negative for difficulty urinating and dysuria.   Musculoskeletal: Negative for arthralgias and gait problem.   Skin: Negative for color change and rash.   Neurological: Negative for dizziness and weakness.   Psychiatric/Behavioral: Negative for agitation and confusion.        Objective    Temp:  [96.7 °F (35.9 °C)-98.4 °F (36.9 °C)] 96.9 °F (36.1 °C)  Heart Rate:  [56-66] 62  Resp:  [18-22] 20  BP: (110-129)/(53-68) 110/53    Physical Exam   Constitutional: He is oriented to person, place, and time. He appears well-developed and well-nourished.   HENT:   Head: Normocephalic and atraumatic.   Eyes: EOM are normal. Pupils are equal, round, and reactive to light.   Neck: Normal range of motion. Neck supple.   Cardiovascular: Normal rate and regular rhythm.    Pulmonary/Chest: Effort normal and breath sounds normal.   Abdominal: Soft. Bowel sounds are normal.   Musculoskeletal: Normal range of motion.   Neurological: He is alert and oriented to person, place, and time.   Skin: Skin is warm and dry.   Psychiatric: He has a normal mood and affect. His behavior is normal.     Results Review:  I have reviewed the labs, radiology results, and diagnostic studies.    Laboratory Data:   Results from last 7 days   Lab Units  12/09/18   0741  12/08/18   0517  12/07/18 2028 12/07/18   0836   SODIUM mmol/L  133*  132*   --   133*   POTASSIUM mmol/L  4.6  5.4*   --   4.5   CHLORIDE mmol/L  101  99   --   93*   CO2 mmol/L  23.0  24.0   --   24.0   BUN mg/dL  17  24*   --   14   CREATININE mg/dL  0.65*  0.78   --   0.57*   GLUCOSE mg/dL  353*  400*  413*  486*   CALCIUM mg/dL  8.5  9.0   --   9.3   BILIRUBIN mg/dL  0.2  0.4   --   0.7   ALK PHOS U/L  70  77   --   102   ALT (SGPT) U/L  21  27   --   25   AST (SGOT) U/L  14*  21   --   30   ANION GAP mmol/L  9.0  9.0   --   16.0*     Estimated Creatinine Clearance: 275.6 mL/min (A) (by C-G formula based on SCr of 0.65 mg/dL (L)).  Results from last 7 days   Lab Units  12/06/18 2129   MAGNESIUM mg/dL  1.9         Results from last 7 days   Lab Units  12/09/18   0514  12/08/18   0517  12/07/18   0836  12/06/18 2129   WBC 10*3/mm3  15.32*  12.56*  11.52*  8.82   HEMOGLOBIN g/dL  12.5*  12.3*  14.3  13.8   HEMATOCRIT %  38.8*  37.3*  42.5  39.7   PLATELETS 10*3/mm3  227  235  189  167     Results from last 7 days   Lab Units  12/06/18 2129   INR   0.97       Culture Data:   Blood Culture   Date Value Ref Range Status   12/07/2018 No growth at 2 days  Preliminary   12/06/2018 Corynebacterium species, not diphtheriae (A)  Final     Comment:     Consistent with contamination at time of collection.  Susceptibility not indicated       No results found for: URINECX  Respiratory Culture   Date Value Ref Range Status   12/06/2018 Normal  Respiratory Radha  Final     No results found for: WOUNDCX  No results found for: STOOLCX  No components found for: BODYFLD    Radiology Data:   Imaging Results (last 24 hours)     ** No results found for the last 24 hours. **          I have reviewed the patient's current medications.     Assessment/Plan     Active Hospital Problems    Diagnosis   • Acute respiratory failure with hypoxia (CMS/HCC)   • Pneumonia of right lower lobe due to Chlamydia species   • Diabetes mellitus (CMS/HCC)   • CAD (coronary artery disease)   • Morbid obesity (CMS/HCC)   • Chronic pulmonary embolism (CMS/HCC)   • Hypertension   • GERD (gastroesophageal reflux disease)   • Mixed hyperlipidemia   • ADOLFO on CPAP       Plan:    1.  Acute hypoxic respiratory failure secondary to right lower lobe pneumonia:  Atypicals negative.  Blood cultures negative.  Continue nebulizers and IV steroids.  Continue azithromycin and Rocephin.  Chest CT pending to reassess pneumonia.  2. Diabetes mellitus, type II:  We'll increase his NovoLog from 30 units to 35 units with each meal as blood sugars elevated secondary to steroids.  3. CAD.  Continue home medicines: Imdur, Toprol, lisinopril, nitroglycerin as needed, Ranexa  4.  Hypertension.  Continue home antihypertensives  5.  GERD.  Continue Protonix  6.  Mixed hyperlipidemia.  Continue home statin.  7.  Morbid obesity. Body mass index is 64.71 kg/m².  Will get nutrition consult  8.  History of PE.  Continue prasugrel and xarelto  9.  Deconditioning: PT/OT.      Discharge Planning: I expect patient to be discharged to home in 1-2 days.      This document has been electronically signed by LALA Carvajal on December 9, 2018 10:38 AM

## 2018-12-10 VITALS
HEART RATE: 54 BPM | HEIGHT: 71 IN | WEIGHT: 315 LBS | OXYGEN SATURATION: 95 % | SYSTOLIC BLOOD PRESSURE: 126 MMHG | RESPIRATION RATE: 20 BRPM | DIASTOLIC BLOOD PRESSURE: 78 MMHG | TEMPERATURE: 96.3 F | BODY MASS INDEX: 44.1 KG/M2

## 2018-12-10 LAB
ALBUMIN SERPL-MCNC: 3.5 G/DL (ref 3.4–4.8)
ALBUMIN/GLOB SERPL: 1.1 G/DL (ref 1.1–1.8)
ALP SERPL-CCNC: 75 U/L (ref 38–126)
ALT SERPL W P-5'-P-CCNC: 19 U/L (ref 21–72)
ANION GAP SERPL CALCULATED.3IONS-SCNC: 8 MMOL/L (ref 5–15)
AST SERPL-CCNC: 19 U/L (ref 17–59)
BASOPHILS # BLD AUTO: 0.04 10*3/MM3 (ref 0–0.2)
BASOPHILS NFR BLD AUTO: 0.3 % (ref 0–2)
BILIRUB SERPL-MCNC: 0.3 MG/DL (ref 0.2–1.3)
BUN BLD-MCNC: 19 MG/DL (ref 7–21)
BUN/CREAT SERPL: 23.2 (ref 7–25)
CALCIUM SPEC-SCNC: 8.3 MG/DL (ref 8.4–10.2)
CHLORIDE SERPL-SCNC: 97 MMOL/L (ref 95–110)
CO2 SERPL-SCNC: 26 MMOL/L (ref 22–31)
CREAT BLD-MCNC: 0.82 MG/DL (ref 0.7–1.3)
DEPRECATED RDW RBC AUTO: 45.6 FL (ref 35.1–43.9)
EOSINOPHIL # BLD AUTO: 0.02 10*3/MM3 (ref 0–0.7)
EOSINOPHIL NFR BLD AUTO: 0.1 % (ref 0–7)
ERYTHROCYTE [DISTWIDTH] IN BLOOD BY AUTOMATED COUNT: 14.7 % (ref 11.5–14.5)
GFR SERPL CREATININE-BSD FRML MDRD: 104 ML/MIN/1.73 (ref 63–147)
GLOBULIN UR ELPH-MCNC: 3.1 GM/DL (ref 2.3–3.5)
GLUCOSE BLD-MCNC: 494 MG/DL (ref 60–100)
GLUCOSE BLDC GLUCOMTR-MCNC: 292 MG/DL (ref 70–130)
GLUCOSE BLDC GLUCOMTR-MCNC: 338 MG/DL (ref 70–130)
GLUCOSE BLDC GLUCOMTR-MCNC: 348 MG/DL (ref 70–130)
GLUCOSE BLDC GLUCOMTR-MCNC: 389 MG/DL (ref 70–130)
GLUCOSE BLDC GLUCOMTR-MCNC: 407 MG/DL (ref 70–130)
HCT VFR BLD AUTO: 37.8 % (ref 39–49)
HGB BLD-MCNC: 12.3 G/DL (ref 13.7–17.3)
IMM GRANULOCYTES # BLD: 0.63 10*3/MM3 (ref 0–0.02)
IMM GRANULOCYTES NFR BLD: 4.5 % (ref 0–0.5)
LYMPHOCYTES # BLD AUTO: 1.7 10*3/MM3 (ref 0.6–4.2)
LYMPHOCYTES NFR BLD AUTO: 12 % (ref 10–50)
MCH RBC QN AUTO: 27.5 PG (ref 26.5–34)
MCHC RBC AUTO-ENTMCNC: 32.5 G/DL (ref 31.5–36.3)
MCV RBC AUTO: 84.6 FL (ref 80–98)
MONOCYTES # BLD AUTO: 1.36 10*3/MM3 (ref 0–0.9)
MONOCYTES NFR BLD AUTO: 9.6 % (ref 0–12)
NEUTROPHILS # BLD AUTO: 10.38 10*3/MM3 (ref 2–8.6)
NEUTROPHILS NFR BLD AUTO: 73.5 % (ref 37–80)
NRBC BLD MANUAL-RTO: 0 /100 WBC (ref 0–0)
PLATELET # BLD AUTO: 223 10*3/MM3 (ref 150–450)
PMV BLD AUTO: 9 FL (ref 8–12)
POTASSIUM BLD-SCNC: 4.3 MMOL/L (ref 3.5–5.1)
PROT SERPL-MCNC: 6.6 G/DL (ref 6.3–8.6)
RBC # BLD AUTO: 4.47 10*6/MM3 (ref 4.37–5.74)
SODIUM BLD-SCNC: 131 MMOL/L (ref 137–145)
WBC NRBC COR # BLD: 14.13 10*3/MM3 (ref 3.2–9.8)

## 2018-12-10 PROCEDURE — 63710000001 INSULIN ASPART PER 5 UNITS: Performed by: NURSE PRACTITIONER

## 2018-12-10 PROCEDURE — 25010000002 METHYLPREDNISOLONE PER 40 MG: Performed by: NURSE PRACTITIONER

## 2018-12-10 PROCEDURE — 85025 COMPLETE CBC W/AUTO DIFF WBC: CPT | Performed by: STUDENT IN AN ORGANIZED HEALTH CARE EDUCATION/TRAINING PROGRAM

## 2018-12-10 PROCEDURE — 94799 UNLISTED PULMONARY SVC/PX: CPT

## 2018-12-10 PROCEDURE — 36415 COLL VENOUS BLD VENIPUNCTURE: CPT | Performed by: STUDENT IN AN ORGANIZED HEALTH CARE EDUCATION/TRAINING PROGRAM

## 2018-12-10 PROCEDURE — 25010000002 CEFTRIAXONE PER 250 MG: Performed by: STUDENT IN AN ORGANIZED HEALTH CARE EDUCATION/TRAINING PROGRAM

## 2018-12-10 PROCEDURE — 80053 COMPREHEN METABOLIC PANEL: CPT | Performed by: STUDENT IN AN ORGANIZED HEALTH CARE EDUCATION/TRAINING PROGRAM

## 2018-12-10 PROCEDURE — 63710000001 INSULIN ASPART PER 5 UNITS: Performed by: STUDENT IN AN ORGANIZED HEALTH CARE EDUCATION/TRAINING PROGRAM

## 2018-12-10 PROCEDURE — 82962 GLUCOSE BLOOD TEST: CPT

## 2018-12-10 PROCEDURE — 94760 N-INVAS EAR/PLS OXIMETRY 1: CPT

## 2018-12-10 PROCEDURE — 25010000002 AZITHROMYCIN PER 500 MG: Performed by: STUDENT IN AN ORGANIZED HEALTH CARE EDUCATION/TRAINING PROGRAM

## 2018-12-10 RX ORDER — CEFDINIR 300 MG/1
300 CAPSULE ORAL 2 TIMES DAILY
Qty: 6 CAPSULE | Refills: 0 | Status: SHIPPED | OUTPATIENT
Start: 2018-12-10 | End: 2018-12-15 | Stop reason: HOSPADM

## 2018-12-10 RX ADMIN — IPRATROPIUM BROMIDE AND ALBUTEROL SULFATE 3 ML: 2.5; .5 SOLUTION RESPIRATORY (INHALATION) at 07:11

## 2018-12-10 RX ADMIN — INSULIN ASPART 16 UNITS: 100 INJECTION, SOLUTION INTRAVENOUS; SUBCUTANEOUS at 08:48

## 2018-12-10 RX ADMIN — NEBIVOLOL HYDROCHLORIDE 5 MG: 5 TABLET ORAL at 08:49

## 2018-12-10 RX ADMIN — INSULIN ASPART 20 UNITS: 100 INJECTION, SOLUTION INTRAVENOUS; SUBCUTANEOUS at 11:09

## 2018-12-10 RX ADMIN — BUDESONIDE AND FORMOTEROL FUMARATE DIHYDRATE 2 PUFF: 160; 4.5 AEROSOL RESPIRATORY (INHALATION) at 07:11

## 2018-12-10 RX ADMIN — RANOLAZINE 1000 MG: 500 TABLET, FILM COATED, EXTENDED RELEASE ORAL at 08:49

## 2018-12-10 RX ADMIN — IPRATROPIUM BROMIDE AND ALBUTEROL SULFATE 3 ML: 2.5; .5 SOLUTION RESPIRATORY (INHALATION) at 11:02

## 2018-12-10 RX ADMIN — CEFTRIAXONE SODIUM 1 G: 1 INJECTION, POWDER, FOR SOLUTION INTRAMUSCULAR; INTRAVENOUS at 06:17

## 2018-12-10 RX ADMIN — SODIUM CHLORIDE, PRESERVATIVE FREE 10 ML: 5 INJECTION INTRAVENOUS at 05:28

## 2018-12-10 RX ADMIN — INSULIN ASPART 35 UNITS: 100 INJECTION, SOLUTION INTRAVENOUS; SUBCUTANEOUS at 11:08

## 2018-12-10 RX ADMIN — METHYLPREDNISOLONE SODIUM SUCCINATE 20 MG: 40 INJECTION, POWDER, FOR SOLUTION INTRAMUSCULAR; INTRAVENOUS at 05:28

## 2018-12-10 RX ADMIN — SODIUM CHLORIDE, PRESERVATIVE FREE 3 ML: 5 INJECTION INTRAVENOUS at 08:48

## 2018-12-10 RX ADMIN — INSULIN ASPART 35 UNITS: 100 INJECTION, SOLUTION INTRAVENOUS; SUBCUTANEOUS at 08:48

## 2018-12-10 RX ADMIN — PRASUGREL HYDROCHLORIDE 10 MG: 10 TABLET, FILM COATED ORAL at 08:49

## 2018-12-10 RX ADMIN — AZITHROMYCIN MONOHYDRATE 500 MG: 500 INJECTION, POWDER, LYOPHILIZED, FOR SOLUTION INTRAVENOUS at 07:24

## 2018-12-10 NOTE — PLAN OF CARE
Problem: Patient Care Overview  Goal: Plan of Care Review  Outcome: Ongoing (interventions implemented as appropriate)   12/10/18 3416   Coping/Psychosocial   Plan of Care Reviewed With patient   Plan of Care Review   Progress improving   OTHER   Outcome Summary Patient is being discharged home today.

## 2018-12-10 NOTE — PLAN OF CARE
Problem: Patient Care Overview  Goal: Plan of Care Review  Outcome: Ongoing (interventions implemented as appropriate)   12/10/18 0157   Plan of Care Review   Progress no change   OTHER   Outcome Summary Pt appeared to be SOA at beginning of shift, MD ordered IV fluids d/c'd; CT scan of chest complete today; blood glucose improved following increase in insulin dosage; vital signs stable at this time; monitoring      Goal: Individualization and Mutuality  Outcome: Ongoing (interventions implemented as appropriate)    Goal: Discharge Needs Assessment  Outcome: Ongoing (interventions implemented as appropriate)      Problem: Skin Injury Risk (Adult)  Goal: Skin Health and Integrity  Outcome: Ongoing (interventions implemented as appropriate)      Problem: Pneumonia (Adult)  Goal: Signs and Symptoms of Listed Potential Problems Will be Absent, Minimized or Managed (Pneumonia)  Outcome: Ongoing (interventions implemented as appropriate)      Problem: Hyperglycemia, Persistent (Adult)  Goal: Signs and Symptoms of Listed Potential Problems Will be Absent, Minimized or Managed (Hyperglycemia, Persistent)  Outcome: Ongoing (interventions implemented as appropriate)

## 2018-12-10 NOTE — DISCHARGE SUMMARY
[facility] Medicine Services  DISCHARGE SUMMARY       Date of Admission: 12/6/2018  Date of Discharge:  12/10/2018  Primary Care Physician: Ruslan Gonzalez APRN    Presenting Problem/History of Present Illness:  Respiratory distress [R06.03]  Pneumonia of right middle lobe due to infectious organism (CMS/HCC) [J18.1]     Final Discharge Diagnoses:  Active Hospital Problems    Diagnosis   • Acute respiratory failure with hypoxia (CMS/HCC)   • Pneumonia of right lower lobe due to Chlamydia species   • Diabetes mellitus (CMS/HCC)   • CAD (coronary artery disease)   • Morbid obesity (CMS/HCC)   • Chronic pulmonary embolism (CMS/HCC)   • Hypertension   • GERD (gastroesophageal reflux disease)   • Mixed hyperlipidemia   • ADOLFO on CPAP       Consults:   Consults     Date and Time Order Name Status Description    12/6/2018 2312 Hospitalist (on-call MD unless specified)          Pertinent Test Results:   Lab Results (last 24 hours)     Procedure Component Value Units Date/Time    POC Glucose Once [[id number]]  (Abnormal) Collected:  12/10/18 1416    Specimen:  Blood Updated:  12/10/18 1428     Glucose 338 mg/dL      Comment: : [id number] YOUSIF JESSICAMeter ID: [id number]       POC Glucose Once [[id number]]  (Abnormal) Collected:  12/09/18 1054    Specimen:  Blood Updated:  12/10/18 1303     Glucose 407 mg/dL      Comment: RN NotifiedOperator: [id number] NEGRO AMBERMeter ID: [id number]       POC Glucose Once [[id number]]  (Abnormal) Collected:  12/10/18 1211    Specimen:  Blood Updated:  12/10/18 1223     Glucose 292 mg/dL      Comment: : [id number] YOUSIF JESSICAMeter ID: [id number]       POC Glucose Once [[id number]]  (Abnormal) Collected:  12/10/18 1051    Specimen:  Blood Updated:  12/10/18 1202     Glucose 389 mg/dL      Comment: RN NotifiedOperator: [id number] CHEY NOLAN ANNMeter ID: [id number]       Comprehensive Metabolic Panel [[id number]]  (Abnormal)  Collected:  12/10/18 0917    Specimen:  Blood Updated:  12/10/18 1002     Glucose 494 mg/dL      BUN 19 mg/dL      Creatinine 0.82 mg/dL      Sodium 131 mmol/L      Potassium 4.3 mmol/L      Chloride 97 mmol/L      CO2 26.0 mmol/L      Calcium 8.3 mg/dL      Total Protein 6.6 g/dL      Albumin 3.50 g/dL      ALT (SGPT) 19 U/L      AST (SGOT) 19 U/L      Alkaline Phosphatase 75 U/L      Total Bilirubin 0.3 mg/dL      eGFR Non African Amer 104 mL/min/1.73      Globulin 3.1 gm/dL      A/G Ratio 1.1 g/dL      BUN/Creatinine Ratio 23.2     Anion Gap 8.0 mmol/L     POC Glucose Once [[id number]]  (Abnormal) Collected:  12/10/18 0752    Specimen:  Blood Updated:  12/10/18 0809     Glucose 348 mg/dL      Comment: RN NotifiedOperator: [id number] CHEY NOLAN ANNMeter ID: [id number]       CBC Auto Differential [[id number]]  (Abnormal) Collected:  12/10/18 0602    Specimen:  Blood Updated:  12/10/18 0711     WBC 14.13 10*3/mm3      RBC 4.47 10*6/mm3      Hemoglobin 12.3 g/dL      Hematocrit 37.8 %      MCV 84.6 fL      MCH 27.5 pg      MCHC 32.5 g/dL      RDW 14.7 %      RDW-SD 45.6 fl      MPV 9.0 fL      Platelets 223 10*3/mm3      Neutrophil % 73.5 %      Lymphocyte % 12.0 %      Monocyte % 9.6 %      Eosinophil % 0.1 %      Basophil % 0.3 %      Immature Grans % 4.5 %      Neutrophils, Absolute 10.38 10*3/mm3      Lymphocytes, Absolute 1.70 10*3/mm3      Monocytes, Absolute 1.36 10*3/mm3      Eosinophils, Absolute 0.02 10*3/mm3      Basophils, Absolute 0.04 10*3/mm3      Immature Grans, Absolute 0.63 10*3/mm3      nRBC 0.0 /100 WBC     Blood Culture - Blood, Arm, Right [[id number]] Collected:  12/07/18 0129    Specimen:  Blood from Arm, Right Updated:  12/10/18 0203     Blood Culture No growth at 3 days    POC Glucose Once [[id number]]  (Abnormal) Collected:  12/09/18 2014    Specimen:  Blood Updated:  12/09/18 2053     Glucose 252 mg/dL      Comment: RN NotifiedOperator: [id number] [facility] ID: [id number]       POC  "Glucose Once [[id number]]  (Abnormal) Collected:  12/09/18 1758    Specimen:  Blood Updated:  12/09/18 1817     Glucose 304 mg/dL      Comment: Sliding Scale AdminOperator: [id number] HANH Olivas ID: [id number]           Imaging Results (last 24 hours)     ** No results found for the last 24 hours. **          Chief Complaint on Day of Discharge: No complaints    Hospital Course:  This is a 38-year-old  male with past medical history of diabetes, hypertension, hyperlipidemia, ADOLFO and GERD that presented to [facility] on 12/6/2018 with complaints of shortness of air ×5 days.  He also had complaints of a productive cough of green and yellow sputum.  Chest x-ray indicates a minimal right lower lobe infiltrates/atelectasis.  Atypicals and influenza negative.  Patient was been started on IVRocephin and azithromycin.  The patient received 3 days of Omnicef at discharge to complete his antibiotic course.  He is currently on room air. He had one blood culture contaminate, but the remainder were clear. CT of the chest on 12/9/2018 was unremarkable.  Patient needs to follow-up with primary care within 1 week.    Condition on Discharge:  Stable    Physical Exam on Discharge:  /78 (BP Location: Left arm, Patient Position: Sitting)   Pulse 54   Temp 96.3 °F (35.7 °C) (Oral)   Resp 20   Ht 180.3 cm (71\")   Wt (!) 210 kg (463 lb 8 oz)   SpO2 95%   BMI 64.65 kg/m²   Physical Exam   Constitutional: He is oriented to person, place, and time. He appears well-developed and well-nourished.   HENT:   Head: Normocephalic and atraumatic.   Eyes: EOM are normal. Pupils are equal, round, and reactive to light.   Neck: Normal range of motion. Neck supple.   Cardiovascular: Normal rate and regular rhythm.   Pulmonary/Chest: Effort normal and breath sounds normal.   Abdominal: Soft. Bowel sounds are normal.   Musculoskeletal: Normal range of motion.   Neurological: He is alert and oriented to person, place, " and time.   Skin: Skin is warm and dry.   Psychiatric: He has a normal mood and affect. His behavior is normal.     Discharge Disposition:  Home or Self Care    Discharge Medications:     Discharge Medications      Changes to Medications      Instructions Start Date   insulin aspart 100 UNIT/ML injection  Commonly known as:  novoLOG  What changed:  how much to take   25 Units, Subcutaneous, 3 Times Daily Before Meals      isosorbide mononitrate 120 MG 24 hr tablet  Commonly known as:  IMDUR  What changed:  when to take this   120 mg, Oral, Daily      lisinopril 40 MG tablet  Commonly known as:  PRINIVILZESTRIL  What changed:  when to take this   40 mg, Oral, Every Morning      rivaroxaban 20 MG tablet  Commonly known as:  XARELTO  What changed:  when to take this   20 mg, Oral, Daily      sertraline 25 MG tablet  Commonly known as:  ZOLOFT  What changed:  when to take this   25 mg, Oral, Daily         Continue These Medications      Instructions Start Date   albuterol 108 (90 Base) MCG/ACT inhaler  Commonly known as:  PROVENTIL HFA;VENTOLIN HFA;PROAIR HFA   2 puffs, Inhalation, Every 4 Hours PRN      albuterol 1.25 MG/3ML nebulizer solution  Commonly known as:  ACCUNEB   1 ampule, Nebulization, Every 6 Hours PRN      amLODIPine 10 MG tablet  Commonly known as:  NORVASC   10 mg, Oral, Nightly      BYSTOLIC 10 MG tablet  Generic drug:  nebivolol   5 mg, 2 Times Daily      glucose monitor monitoring kit   1 each, Does not apply, As Needed      MICROLET LANCETS misc   One touch delica lancets      nitroglycerin 0.4 MG SL tablet  Commonly known as:  NITROSTAT   0.4 mg, Sublingual, Every 5 Minutes PRN, Take no more than 3 doses in 15 minutes.      pantoprazole 40 MG EC tablet  Commonly known as:  PROTONIX   40 mg, Oral, Nightly      pramipexole 1 MG tablet  Commonly known as:  MIRAPEX   TAKE TWO TABLETS BY MOUTH EVERY NIGHT      prasugrel 10 MG tablet  Commonly known as:  EFFIENT   10 mg, Oral      ranolazine 1000 MG 12  "hr tablet  Commonly known as:  RANEXA   1,000 mg, Oral, Every 12 Hours Scheduled      RELION INSULIN SYRINGE 31G X 15/64\" 0.5 ML misc  Generic drug:  Insulin Syringe-Needle U-100   No dose, route, or frequency recorded.      traZODone 300 MG tablet  Commonly known as:  DESYREL   TAKE ONE TABLET BY MOUTH EVERY NIGHT         ASK your doctor about these medications      Instructions Start Date   TRESIBA FLEXTOUCH 100 UNIT/ML solution pen-injector injection  Generic drug:  insulin degludec   75 Units, Subcutaneous, Nightly             Discharge Diet:   Diet Instructions     Diet: Consistent Carbohydrate, Cardiac      Discharge Diet:   Consistent Carbohydrate  Cardiac             Activity at Discharge:   Activity Instructions     Activity as Tolerated            Discharge Care Plan/Instructions: As above.     Follow-up Appointments:   Future Appointments   Date Time Provider Department Center   8/1/2019  1:00 PM Antony Jenkins MD PhD [facility] None       Test Results Pending at Discharge:    Order Current Status    Blood Culture - Blood, Arm, Right Preliminary result            This document has been electronically signed by LALA Carvajal on December 10, 2018 3:09 PM        Time: Greater than 30 minutes.               "

## 2018-12-10 NOTE — PLAN OF CARE
Problem: Patient Care Overview  Goal: Plan of Care Review  Outcome: Ongoing (interventions implemented as appropriate)   12/09/18 1928   Coping/Psychosocial   Plan of Care Reviewed With patient   Plan of Care Review   Progress improving   OTHER   Outcome Summary glucose ranging from 300-400 today. improving some. pt states he wants to go home.      Goal: Individualization and Mutuality  Outcome: Ongoing (interventions implemented as appropriate)    Goal: Discharge Needs Assessment  Outcome: Ongoing (interventions implemented as appropriate)    Goal: Interprofessional Rounds/Family Conf  Outcome: Ongoing (interventions implemented as appropriate)      Problem: Skin Injury Risk (Adult)  Goal: Identify Related Risk Factors and Signs and Symptoms  Outcome: Outcome(s) achieved Date Met: 12/09/18    Goal: Skin Health and Integrity  Outcome: Ongoing (interventions implemented as appropriate)      Problem: Fall Risk (Adult)  Goal: Identify Related Risk Factors and Signs and Symptoms  Outcome: Outcome(s) achieved Date Met: 12/09/18    Goal: Absence of Fall  Outcome: Outcome(s) achieved Date Met: 12/09/18      Problem: Pneumonia (Adult)  Goal: Signs and Symptoms of Listed Potential Problems Will be Absent, Minimized or Managed (Pneumonia)  Outcome: Ongoing (interventions implemented as appropriate)      Problem: Hyperglycemia, Persistent (Adult)  Goal: Signs and Symptoms of Listed Potential Problems Will be Absent, Minimized or Managed (Hyperglycemia, Persistent)  Outcome: Ongoing (interventions implemented as appropriate)

## 2018-12-11 ENCOUNTER — READMISSION MANAGEMENT (OUTPATIENT)
Dept: CALL CENTER | Facility: HOSPITAL | Age: 38
End: 2018-12-11

## 2018-12-11 NOTE — OUTREACH NOTE
Prep Survey      Responses   Facility patient discharged from?  [facility]   Is patient eligible?  Yes   Discharge diagnosis  Acute resp. failure, Pneumonia of RLL due to Chlamydia species, DM, CAD, morbid obesity, Chronic PE, HTN, GERD, mixed HLD, ADOLFO on CPAP   Does the patient have one of the following disease processes/diagnoses(primary or secondary)?  COPD/Pneumonia   Does the patient have Home health ordered?  No   Is there a DME ordered?  No   Comments regarding appointments  Pt to schedule follow up with PCP within 1 week   Prep survey completed?  Yes          Radha Merino RN

## 2018-12-12 ENCOUNTER — READMISSION MANAGEMENT (OUTPATIENT)
Dept: CALL CENTER | Facility: HOSPITAL | Age: 38
End: 2018-12-12

## 2018-12-12 LAB — BACTERIA SPEC AEROBE CULT: NORMAL

## 2018-12-12 NOTE — OUTREACH NOTE
COPD/PN Week 1 Survey      Responses   Facility patient discharged from?  [facility]   Does the patient have one of the following disease processes/diagnoses(primary or secondary)?  COPD/Pneumonia   Is there a successful TCM telephone encounter documented?  No   Was the primary reason for admission:  Pneumonia   Week 1 attempt successful?  No   Unsuccessful attempts  Attempt 1          Tere Zimmerman RN

## 2018-12-13 ENCOUNTER — APPOINTMENT (OUTPATIENT)
Dept: GENERAL RADIOLOGY | Facility: HOSPITAL | Age: 38
End: 2018-12-13

## 2018-12-13 ENCOUNTER — READMISSION MANAGEMENT (OUTPATIENT)
Dept: CALL CENTER | Facility: HOSPITAL | Age: 38
End: 2018-12-13

## 2018-12-13 ENCOUNTER — HOSPITAL ENCOUNTER (OUTPATIENT)
Facility: HOSPITAL | Age: 38
Setting detail: OBSERVATION
LOS: 1 days | Discharge: HOME | End: 2018-12-15
Attending: FAMILY MEDICINE | Admitting: FAMILY MEDICINE

## 2018-12-13 DIAGNOSIS — A41.9 SEPSIS, DUE TO UNSPECIFIED ORGANISM: Primary | ICD-10-CM

## 2018-12-13 LAB
ALBUMIN SERPL-MCNC: 3.3 G/DL (ref 3.4–4.8)
ALBUMIN/GLOB SERPL: 1.1 G/DL (ref 1.1–1.8)
ALP SERPL-CCNC: 70 U/L (ref 38–126)
ALT SERPL W P-5'-P-CCNC: 41 U/L (ref 21–72)
ANION GAP SERPL CALCULATED.3IONS-SCNC: 8 MMOL/L (ref 5–15)
ARTERIAL PATENCY WRIST A: POSITIVE
AST SERPL-CCNC: 36 U/L (ref 17–59)
ATMOSPHERIC PRESS: 748 MMHG
BASE EXCESS BLDA CALC-SCNC: 2.6 MMOL/L (ref 0–2)
BASOPHILS # BLD AUTO: 0.04 10*3/MM3 (ref 0–0.2)
BASOPHILS NFR BLD AUTO: 0.4 % (ref 0–2)
BDY SITE: ABNORMAL
BILIRUB SERPL-MCNC: 0.3 MG/DL (ref 0.2–1.3)
BUN BLD-MCNC: 9 MG/DL (ref 7–21)
BUN/CREAT SERPL: 14.1 (ref 7–25)
CALCIUM SPEC-SCNC: 8 MG/DL (ref 8.4–10.2)
CHLORIDE SERPL-SCNC: 94 MMOL/L (ref 95–110)
CO2 SERPL-SCNC: 28 MMOL/L (ref 22–31)
CREAT BLD-MCNC: 0.64 MG/DL (ref 0.7–1.3)
CRP SERPL-MCNC: 1.8 MG/DL (ref 0–1)
D-LACTATE SERPL-SCNC: 3.6 MMOL/L (ref 0.5–2)
DEPRECATED RDW RBC AUTO: 42 FL (ref 35.1–43.9)
EOSINOPHIL # BLD AUTO: 0.29 10*3/MM3 (ref 0–0.7)
EOSINOPHIL NFR BLD AUTO: 2.7 % (ref 0–7)
ERYTHROCYTE [DISTWIDTH] IN BLOOD BY AUTOMATED COUNT: 14.1 % (ref 11.5–14.5)
FLUAV AG NPH QL: NEGATIVE
FLUBV AG NPH QL IA: NEGATIVE
GFR SERPL CREATININE-BSD FRML MDRD: 139 ML/MIN/1.73 (ref 63–147)
GLOBULIN UR ELPH-MCNC: 3 GM/DL (ref 2.3–3.5)
GLUCOSE BLD-MCNC: 363 MG/DL (ref 60–100)
GLUCOSE BLDC GLUCOMTR-MCNC: 236 MG/DL (ref 70–130)
GLUCOSE BLDC GLUCOMTR-MCNC: 273 MG/DL (ref 70–130)
GLUCOSE BLDC GLUCOMTR-MCNC: 331 MG/DL (ref 70–130)
HCO3 BLDA-SCNC: 27.5 MMOL/L (ref 20–26)
HCT VFR BLD AUTO: 39.4 % (ref 39–49)
HGB BLD-MCNC: 13.4 G/DL (ref 13.7–17.3)
HOLD SPECIMEN: NORMAL
IMM GRANULOCYTES # BLD: 0.62 10*3/MM3 (ref 0–0.02)
IMM GRANULOCYTES NFR BLD: 5.8 % (ref 0–0.5)
LYMPHOCYTES # BLD AUTO: 1.81 10*3/MM3 (ref 0.6–4.2)
LYMPHOCYTES # BLD MANUAL: 1.82 10*3/MM3 (ref 0.6–4.2)
LYMPHOCYTES NFR BLD AUTO: 16.9 % (ref 10–50)
LYMPHOCYTES NFR BLD MANUAL: 17 % (ref 10–50)
LYMPHOCYTES NFR BLD MANUAL: 3 % (ref 0–12)
Lab: ABNORMAL
MCH RBC QN AUTO: 27.8 PG (ref 26.5–34)
MCHC RBC AUTO-ENTMCNC: 34 G/DL (ref 31.5–36.3)
MCV RBC AUTO: 81.7 FL (ref 80–98)
METAMYELOCYTES NFR BLD MANUAL: 2 % (ref 0–0)
MODALITY: ABNORMAL
MONOCYTES # BLD AUTO: 0.32 10*3/MM3 (ref 0–0.9)
MONOCYTES # BLD AUTO: 0.8 10*3/MM3 (ref 0–0.9)
MONOCYTES NFR BLD AUTO: 7.5 % (ref 0–12)
MYELOCYTES NFR BLD MANUAL: 1 % (ref 0–0)
NEUTROPHILS # BLD AUTO: 7.14 10*3/MM3 (ref 2–8.6)
NEUTROPHILS # BLD AUTO: 8.24 10*3/MM3 (ref 2–8.6)
NEUTROPHILS NFR BLD AUTO: 66.7 % (ref 37–80)
NEUTROPHILS NFR BLD MANUAL: 72 % (ref 37–80)
NEUTS BAND NFR BLD MANUAL: 5 % (ref 0–5)
NT-PROBNP SERPL-MCNC: 79.4 PG/ML (ref 0–450)
PCO2 BLDA: 42.6 MM HG (ref 35–45)
PH BLDA: 7.42 PH UNITS (ref 7.35–7.45)
PLAT MORPH BLD: NORMAL
PLATELET # BLD AUTO: 201 10*3/MM3 (ref 150–450)
PMV BLD AUTO: 8.5 FL (ref 8–12)
PO2 BLDA: 77.2 MM HG (ref 83–108)
POTASSIUM BLD-SCNC: 3.6 MMOL/L (ref 3.5–5.1)
PROT SERPL-MCNC: 6.3 G/DL (ref 6.3–8.6)
RBC # BLD AUTO: 4.82 10*6/MM3 (ref 4.37–5.74)
RBC MORPH BLD: NORMAL
SAO2 % BLDCOA: 96.3 % (ref 94–99)
SODIUM BLD-SCNC: 130 MMOL/L (ref 137–145)
TROPONIN I SERPL-MCNC: <0.012 NG/ML
VENTILATOR MODE: ABNORMAL
WBC MORPH BLD: NORMAL
WBC NRBC COR # BLD: 10.7 10*3/MM3 (ref 3.2–9.8)
WHOLE BLOOD HOLD SPECIMEN: NORMAL
WHOLE BLOOD HOLD SPECIMEN: NORMAL

## 2018-12-13 PROCEDURE — 96365 THER/PROPH/DIAG IV INF INIT: CPT

## 2018-12-13 PROCEDURE — 82962 GLUCOSE BLOOD TEST: CPT

## 2018-12-13 PROCEDURE — 82803 BLOOD GASES ANY COMBINATION: CPT

## 2018-12-13 PROCEDURE — 94640 AIRWAY INHALATION TREATMENT: CPT

## 2018-12-13 PROCEDURE — 25010000002 AZITHROMYCIN PER 500 MG: Performed by: PHYSICIAN ASSISTANT

## 2018-12-13 PROCEDURE — 36600 WITHDRAWAL OF ARTERIAL BLOOD: CPT

## 2018-12-13 PROCEDURE — 96375 TX/PRO/DX INJ NEW DRUG ADDON: CPT

## 2018-12-13 PROCEDURE — 80053 COMPREHEN METABOLIC PANEL: CPT | Performed by: FAMILY MEDICINE

## 2018-12-13 PROCEDURE — G0378 HOSPITAL OBSERVATION PER HR: HCPCS

## 2018-12-13 PROCEDURE — 99284 EMERGENCY DEPT VISIT MOD MDM: CPT

## 2018-12-13 PROCEDURE — 36415 COLL VENOUS BLD VENIPUNCTURE: CPT | Performed by: PHYSICIAN ASSISTANT

## 2018-12-13 PROCEDURE — 86140 C-REACTIVE PROTEIN: CPT | Performed by: PHYSICIAN ASSISTANT

## 2018-12-13 PROCEDURE — 93005 ELECTROCARDIOGRAM TRACING: CPT

## 2018-12-13 PROCEDURE — 25010000002 METHYLPREDNISOLONE PER 125 MG: Performed by: PHYSICIAN ASSISTANT

## 2018-12-13 PROCEDURE — 85025 COMPLETE CBC W/AUTO DIFF WBC: CPT | Performed by: FAMILY MEDICINE

## 2018-12-13 PROCEDURE — 94760 N-INVAS EAR/PLS OXIMETRY 1: CPT

## 2018-12-13 PROCEDURE — 87804 INFLUENZA ASSAY W/OPTIC: CPT | Performed by: PHYSICIAN ASSISTANT

## 2018-12-13 PROCEDURE — 25010000002 ONDANSETRON PER 1 MG: Performed by: FAMILY MEDICINE

## 2018-12-13 PROCEDURE — 85007 BL SMEAR W/DIFF WBC COUNT: CPT | Performed by: FAMILY MEDICINE

## 2018-12-13 PROCEDURE — 83880 ASSAY OF NATRIURETIC PEPTIDE: CPT | Performed by: FAMILY MEDICINE

## 2018-12-13 PROCEDURE — 96361 HYDRATE IV INFUSION ADD-ON: CPT

## 2018-12-13 PROCEDURE — 25010000002 MORPHINE PER 10 MG: Performed by: FAMILY MEDICINE

## 2018-12-13 PROCEDURE — 71046 X-RAY EXAM CHEST 2 VIEWS: CPT

## 2018-12-13 PROCEDURE — 93005 ELECTROCARDIOGRAM TRACING: CPT | Performed by: FAMILY MEDICINE

## 2018-12-13 PROCEDURE — 87040 BLOOD CULTURE FOR BACTERIA: CPT | Performed by: PHYSICIAN ASSISTANT

## 2018-12-13 PROCEDURE — 84484 ASSAY OF TROPONIN QUANT: CPT | Performed by: FAMILY MEDICINE

## 2018-12-13 PROCEDURE — 93010 ELECTROCARDIOGRAM REPORT: CPT | Performed by: INTERNAL MEDICINE

## 2018-12-13 PROCEDURE — 83605 ASSAY OF LACTIC ACID: CPT | Performed by: PHYSICIAN ASSISTANT

## 2018-12-13 PROCEDURE — 25010000002 CEFTRIAXONE PER 250 MG: Performed by: PHYSICIAN ASSISTANT

## 2018-12-13 RX ORDER — ACETAMINOPHEN 325 MG/1
650 TABLET ORAL EVERY 6 HOURS PRN
Status: DISCONTINUED | OUTPATIENT
Start: 2018-12-13 | End: 2018-12-15 | Stop reason: HOSPADM

## 2018-12-13 RX ORDER — IPRATROPIUM BROMIDE AND ALBUTEROL SULFATE 2.5; .5 MG/3ML; MG/3ML
3 SOLUTION RESPIRATORY (INHALATION)
Status: DISCONTINUED | OUTPATIENT
Start: 2018-12-14 | End: 2018-12-15 | Stop reason: HOSPADM

## 2018-12-13 RX ORDER — NITROGLYCERIN 0.4 MG/1
0.4 TABLET SUBLINGUAL
Status: DISCONTINUED | OUTPATIENT
Start: 2018-12-13 | End: 2018-12-15 | Stop reason: HOSPADM

## 2018-12-13 RX ORDER — PRASUGREL 10 MG/1
10 TABLET, FILM COATED ORAL DAILY
Status: DISCONTINUED | OUTPATIENT
Start: 2018-12-14 | End: 2018-12-15 | Stop reason: HOSPADM

## 2018-12-13 RX ORDER — NEBIVOLOL 5 MG/1
5 TABLET ORAL 2 TIMES DAILY
Status: DISCONTINUED | OUTPATIENT
Start: 2018-12-13 | End: 2018-12-15 | Stop reason: HOSPADM

## 2018-12-13 RX ORDER — SERTRALINE HYDROCHLORIDE 25 MG/1
25 TABLET, FILM COATED ORAL DAILY
Status: DISCONTINUED | OUTPATIENT
Start: 2018-12-14 | End: 2018-12-15 | Stop reason: HOSPADM

## 2018-12-13 RX ORDER — NICOTINE POLACRILEX 4 MG
15 LOZENGE BUCCAL
Status: DISCONTINUED | OUTPATIENT
Start: 2018-12-13 | End: 2018-12-15 | Stop reason: HOSPADM

## 2018-12-13 RX ORDER — DEXTROSE MONOHYDRATE 25 G/50ML
25 INJECTION, SOLUTION INTRAVENOUS
Status: DISCONTINUED | OUTPATIENT
Start: 2018-12-13 | End: 2018-12-15 | Stop reason: HOSPADM

## 2018-12-13 RX ORDER — MORPHINE SULFATE 2 MG/ML
2 INJECTION, SOLUTION INTRAMUSCULAR; INTRAVENOUS ONCE
Status: COMPLETED | OUTPATIENT
Start: 2018-12-13 | End: 2018-12-13

## 2018-12-13 RX ORDER — SODIUM CHLORIDE 9 MG/ML
125 INJECTION, SOLUTION INTRAVENOUS CONTINUOUS
Status: DISCONTINUED | OUTPATIENT
Start: 2018-12-13 | End: 2018-12-15 | Stop reason: HOSPADM

## 2018-12-13 RX ORDER — SODIUM CHLORIDE 0.9 % (FLUSH) 0.9 %
3 SYRINGE (ML) INJECTION EVERY 12 HOURS SCHEDULED
Status: DISCONTINUED | OUTPATIENT
Start: 2018-12-13 | End: 2018-12-15 | Stop reason: HOSPADM

## 2018-12-13 RX ORDER — ONDANSETRON 2 MG/ML
4 INJECTION INTRAMUSCULAR; INTRAVENOUS EVERY 6 HOURS PRN
Status: DISCONTINUED | OUTPATIENT
Start: 2018-12-13 | End: 2018-12-15 | Stop reason: HOSPADM

## 2018-12-13 RX ORDER — LISINOPRIL 40 MG/1
40 TABLET ORAL NIGHTLY
Status: DISCONTINUED | OUTPATIENT
Start: 2018-12-13 | End: 2018-12-15 | Stop reason: HOSPADM

## 2018-12-13 RX ORDER — ISOSORBIDE MONONITRATE 60 MG/1
120 TABLET, EXTENDED RELEASE ORAL DAILY
Status: DISCONTINUED | OUTPATIENT
Start: 2018-12-14 | End: 2018-12-15 | Stop reason: HOSPADM

## 2018-12-13 RX ORDER — IPRATROPIUM BROMIDE AND ALBUTEROL SULFATE 2.5; .5 MG/3ML; MG/3ML
3 SOLUTION RESPIRATORY (INHALATION) ONCE
Status: COMPLETED | OUTPATIENT
Start: 2018-12-13 | End: 2018-12-13

## 2018-12-13 RX ORDER — BISACODYL 5 MG/1
5 TABLET, DELAYED RELEASE ORAL DAILY PRN
Status: DISCONTINUED | OUTPATIENT
Start: 2018-12-13 | End: 2018-12-15 | Stop reason: HOSPADM

## 2018-12-13 RX ORDER — PANTOPRAZOLE SODIUM 40 MG/1
40 TABLET, DELAYED RELEASE ORAL NIGHTLY
Status: DISCONTINUED | OUTPATIENT
Start: 2018-12-13 | End: 2018-12-15 | Stop reason: HOSPADM

## 2018-12-13 RX ORDER — ONDANSETRON 2 MG/ML
4 INJECTION INTRAMUSCULAR; INTRAVENOUS ONCE
Status: COMPLETED | OUTPATIENT
Start: 2018-12-13 | End: 2018-12-13

## 2018-12-13 RX ORDER — TRAZODONE HYDROCHLORIDE 150 MG/1
300 TABLET ORAL NIGHTLY
Status: DISCONTINUED | OUTPATIENT
Start: 2018-12-13 | End: 2018-12-15 | Stop reason: HOSPADM

## 2018-12-13 RX ORDER — PRAMIPEXOLE DIHYDROCHLORIDE 1 MG/1
1 TABLET ORAL NIGHTLY
Status: DISCONTINUED | OUTPATIENT
Start: 2018-12-13 | End: 2018-12-15 | Stop reason: HOSPADM

## 2018-12-13 RX ORDER — SODIUM CHLORIDE 0.9 % (FLUSH) 0.9 %
10 SYRINGE (ML) INJECTION AS NEEDED
Status: DISCONTINUED | OUTPATIENT
Start: 2018-12-13 | End: 2018-12-15 | Stop reason: HOSPADM

## 2018-12-13 RX ORDER — BUDESONIDE AND FORMOTEROL FUMARATE DIHYDRATE 160; 4.5 UG/1; UG/1
2 AEROSOL RESPIRATORY (INHALATION)
Status: DISCONTINUED | OUTPATIENT
Start: 2018-12-13 | End: 2018-12-15 | Stop reason: HOSPADM

## 2018-12-13 RX ORDER — SODIUM CHLORIDE 0.9 % (FLUSH) 0.9 %
3-10 SYRINGE (ML) INJECTION AS NEEDED
Status: DISCONTINUED | OUTPATIENT
Start: 2018-12-13 | End: 2018-12-15 | Stop reason: HOSPADM

## 2018-12-13 RX ORDER — AMLODIPINE BESYLATE 10 MG/1
10 TABLET ORAL NIGHTLY
Status: DISCONTINUED | OUTPATIENT
Start: 2018-12-13 | End: 2018-12-15 | Stop reason: HOSPADM

## 2018-12-13 RX ORDER — RANOLAZINE 500 MG/1
1000 TABLET, EXTENDED RELEASE ORAL EVERY 12 HOURS SCHEDULED
Status: DISCONTINUED | OUTPATIENT
Start: 2018-12-13 | End: 2018-12-15 | Stop reason: HOSPADM

## 2018-12-13 RX ORDER — METHYLPREDNISOLONE SODIUM SUCCINATE 125 MG/2ML
125 INJECTION, POWDER, LYOPHILIZED, FOR SOLUTION INTRAMUSCULAR; INTRAVENOUS ONCE
Status: COMPLETED | OUTPATIENT
Start: 2018-12-13 | End: 2018-12-13

## 2018-12-13 RX ORDER — SODIUM CHLORIDE 9 MG/ML
INJECTION, SOLUTION INTRAVENOUS
Status: COMPLETED
Start: 2018-12-13 | End: 2018-12-13

## 2018-12-13 RX ORDER — METHYLPREDNISOLONE SODIUM SUCCINATE 125 MG/2ML
60 INJECTION, POWDER, LYOPHILIZED, FOR SOLUTION INTRAMUSCULAR; INTRAVENOUS EVERY 8 HOURS
Status: DISCONTINUED | OUTPATIENT
Start: 2018-12-14 | End: 2018-12-14

## 2018-12-13 RX ADMIN — ONDANSETRON HYDROCHLORIDE 4 MG: 2 INJECTION, SOLUTION INTRAMUSCULAR; INTRAVENOUS at 20:56

## 2018-12-13 RX ADMIN — AZITHROMYCIN MONOHYDRATE 500 MG: 500 INJECTION, POWDER, LYOPHILIZED, FOR SOLUTION INTRAVENOUS at 21:35

## 2018-12-13 RX ADMIN — SODIUM CHLORIDE 2 G: 900 INJECTION INTRAVENOUS at 21:12

## 2018-12-13 RX ADMIN — MORPHINE SULFATE 2 MG: 2 INJECTION, SOLUTION INTRAMUSCULAR; INTRAVENOUS at 20:59

## 2018-12-13 RX ADMIN — IPRATROPIUM BROMIDE AND ALBUTEROL SULFATE 3 ML: 2.5; .5 SOLUTION RESPIRATORY (INHALATION) at 19:30

## 2018-12-13 RX ADMIN — SODIUM CHLORIDE 500 ML: 9 INJECTION, SOLUTION INTRAVENOUS at 19:20

## 2018-12-13 RX ADMIN — METHYLPREDNISOLONE SODIUM SUCCINATE 125 MG: 125 INJECTION, POWDER, FOR SOLUTION INTRAMUSCULAR; INTRAVENOUS at 19:21

## 2018-12-13 RX ADMIN — SODIUM CHLORIDE 1000 ML: 9 INJECTION, SOLUTION INTRAVENOUS at 20:47

## 2018-12-13 NOTE — OUTREACH NOTE
COPD/PN Week 1 Survey      Responses   Facility patient discharged from?  [facility]   Does the patient have one of the following disease processes/diagnoses(primary or secondary)?  COPD/Pneumonia   Is there a successful TCM telephone encounter documented?  No   Was the primary reason for admission:  Pneumonia   Week 1 attempt successful?  Yes   Call start time  1551   Call end time  1556   Discharge diagnosis  Acute resp. failure, Pneumonia of RLL due to Chlamydia species, DM, CAD, morbid obesity, Chronic PE, HTN, GERD, mixed HLD, ADOLFO on CPAP   Meds reviewed with patient/caregiver?  Yes   Is the patient having any side effects they believe may be caused by any medication additions or changes?  No   Does the patient have all medications ordered at discharge?  Yes   Is the patient taking all medications as directed (includes completed medication regime)?  Yes   Does the patient have a primary care provider?   Yes   Does the patient have an appointment with their PCP or pulmonologist within 7 days of discharge?  Yes   Has the patient kept scheduled appointments due by today?  N/A   Has home health visited the patient within 72 hours of discharge?  N/A   Psychosocial issues?  No   Did the patient receive a copy of their discharge instructions?  Yes   Nursing interventions  Reviewed instructions with patient   What is the patient's perception of their health status since discharge?  Improving   Nursing Interventions  Nurse provided patient education   Is the patient/caregiver able to teach back the hierarchy of who to call/visit for symptoms/problems? PCP, Specialist, Home health nurse, Urgent Care, ED, 911  Yes   Is the patient able to teach back COPD zones?  Yes   Nursing interventions  Education provided on various zones   Patient reports what zone on this call?  Green Zone   Green Zone  Reports doing well, Breathing without shortness of breath, Usual activity and exercise level, Usual amount of phlegm/mucus  without difficulty coughing up, Sleeping well, Appetite is good   Green Zone interventions:  Take daily medications, Continue regular exercise/diet plan, Do not smoke   Week 1 call completed?  Yes          Paradise Oneill RN

## 2018-12-14 ENCOUNTER — READMISSION MANAGEMENT (OUTPATIENT)
Dept: CALL CENTER | Facility: HOSPITAL | Age: 38
End: 2018-12-14

## 2018-12-14 PROBLEM — E87.20 LACTIC ACIDOSIS: Status: ACTIVE | Noted: 2018-12-14

## 2018-12-14 PROBLEM — J40 BRONCHITIS: Status: ACTIVE | Noted: 2018-12-14

## 2018-12-14 PROBLEM — J06.9 URI (UPPER RESPIRATORY INFECTION): Status: ACTIVE | Noted: 2018-12-14

## 2018-12-14 LAB
ANION GAP SERPL CALCULATED.3IONS-SCNC: 12 MMOL/L (ref 5–15)
BASOPHILS # BLD AUTO: 0.04 10*3/MM3 (ref 0–0.2)
BASOPHILS NFR BLD AUTO: 0.3 % (ref 0–2)
BUN BLD-MCNC: 11 MG/DL (ref 7–21)
BUN/CREAT SERPL: 18.3 (ref 7–25)
CALCIUM SPEC-SCNC: 8.6 MG/DL (ref 8.4–10.2)
CHLORIDE SERPL-SCNC: 101 MMOL/L (ref 95–110)
CO2 SERPL-SCNC: 23 MMOL/L (ref 22–31)
CREAT BLD-MCNC: 0.6 MG/DL (ref 0.7–1.3)
D-LACTATE SERPL-SCNC: 2 MMOL/L (ref 0.5–2)
DEPRECATED RDW RBC AUTO: 43.3 FL (ref 35.1–43.9)
EOSINOPHIL # BLD AUTO: 0.03 10*3/MM3 (ref 0–0.7)
EOSINOPHIL NFR BLD AUTO: 0.2 % (ref 0–7)
ERYTHROCYTE [DISTWIDTH] IN BLOOD BY AUTOMATED COUNT: 14.2 % (ref 11.5–14.5)
GFR SERPL CREATININE-BSD FRML MDRD: 149 ML/MIN/1.73 (ref 63–147)
GLUCOSE BLD-MCNC: 419 MG/DL (ref 60–100)
GLUCOSE BLDC GLUCOMTR-MCNC: 272 MG/DL (ref 70–130)
GLUCOSE BLDC GLUCOMTR-MCNC: 272 MG/DL (ref 70–130)
GLUCOSE BLDC GLUCOMTR-MCNC: 334 MG/DL (ref 70–130)
GLUCOSE BLDC GLUCOMTR-MCNC: 365 MG/DL (ref 70–130)
HCT VFR BLD AUTO: 42.9 % (ref 39–49)
HGB BLD-MCNC: 14.3 G/DL (ref 13.7–17.3)
IMM GRANULOCYTES # BLD: 0.8 10*3/MM3 (ref 0–0.02)
IMM GRANULOCYTES NFR BLD: 5 % (ref 0–0.5)
LYMPHOCYTES # BLD AUTO: 1.27 10*3/MM3 (ref 0.6–4.2)
LYMPHOCYTES NFR BLD AUTO: 8 % (ref 10–50)
MCH RBC QN AUTO: 27.5 PG (ref 26.5–34)
MCHC RBC AUTO-ENTMCNC: 33.3 G/DL (ref 31.5–36.3)
MCV RBC AUTO: 82.5 FL (ref 80–98)
MONOCYTES # BLD AUTO: 0.29 10*3/MM3 (ref 0–0.9)
MONOCYTES NFR BLD AUTO: 1.8 % (ref 0–12)
NEUTROPHILS # BLD AUTO: 13.51 10*3/MM3 (ref 2–8.6)
NEUTROPHILS NFR BLD AUTO: 84.7 % (ref 37–80)
NRBC BLD MANUAL-RTO: 0 /100 WBC (ref 0–0)
PLATELET # BLD AUTO: 195 10*3/MM3 (ref 150–450)
PMV BLD AUTO: 9 FL (ref 8–12)
POTASSIUM BLD-SCNC: 4.8 MMOL/L (ref 3.5–5.1)
RBC # BLD AUTO: 5.2 10*6/MM3 (ref 4.37–5.74)
SODIUM BLD-SCNC: 136 MMOL/L (ref 137–145)
WBC NRBC COR # BLD: 15.94 10*3/MM3 (ref 3.2–9.8)

## 2018-12-14 PROCEDURE — 63710000001 INSULIN ASPART PER 5 UNITS: Performed by: NURSE PRACTITIONER

## 2018-12-14 PROCEDURE — 63710000001 INSULIN ASPART PER 5 UNITS: Performed by: INTERNAL MEDICINE

## 2018-12-14 PROCEDURE — 96361 HYDRATE IV INFUSION ADD-ON: CPT

## 2018-12-14 PROCEDURE — 63710000001 INSULIN DETEMIR PER 5 UNITS: Performed by: INTERNAL MEDICINE

## 2018-12-14 PROCEDURE — 85025 COMPLETE CBC W/AUTO DIFF WBC: CPT | Performed by: INTERNAL MEDICINE

## 2018-12-14 PROCEDURE — G0378 HOSPITAL OBSERVATION PER HR: HCPCS

## 2018-12-14 PROCEDURE — 80048 BASIC METABOLIC PNL TOTAL CA: CPT | Performed by: INTERNAL MEDICINE

## 2018-12-14 PROCEDURE — 96376 TX/PRO/DX INJ SAME DRUG ADON: CPT

## 2018-12-14 PROCEDURE — 83605 ASSAY OF LACTIC ACID: CPT | Performed by: PHYSICIAN ASSISTANT

## 2018-12-14 PROCEDURE — 82962 GLUCOSE BLOOD TEST: CPT

## 2018-12-14 PROCEDURE — 94799 UNLISTED PULMONARY SVC/PX: CPT

## 2018-12-14 PROCEDURE — 94760 N-INVAS EAR/PLS OXIMETRY 1: CPT

## 2018-12-14 PROCEDURE — 25010000002 METHYLPREDNISOLONE PER 125 MG: Performed by: INTERNAL MEDICINE

## 2018-12-14 RX ORDER — FLUTICASONE PROPIONATE 50 MCG
2 SPRAY, SUSPENSION (ML) NASAL DAILY
Status: DISCONTINUED | OUTPATIENT
Start: 2018-12-14 | End: 2018-12-15 | Stop reason: HOSPADM

## 2018-12-14 RX ORDER — PREDNISONE 20 MG/1
40 TABLET ORAL
Status: DISCONTINUED | OUTPATIENT
Start: 2018-12-15 | End: 2018-12-15 | Stop reason: HOSPADM

## 2018-12-14 RX ADMIN — BUDESONIDE AND FORMOTEROL FUMARATE DIHYDRATE 2 PUFF: 160; 4.5 AEROSOL RESPIRATORY (INHALATION) at 18:55

## 2018-12-14 RX ADMIN — TRAZODONE HYDROCHLORIDE 300 MG: 150 TABLET ORAL at 20:38

## 2018-12-14 RX ADMIN — INSULIN ASPART 25 UNITS: 100 INJECTION, SOLUTION INTRAVENOUS; SUBCUTANEOUS at 07:54

## 2018-12-14 RX ADMIN — AMLODIPINE BESYLATE 10 MG: 10 TABLET ORAL at 20:39

## 2018-12-14 RX ADMIN — PANTOPRAZOLE SODIUM 40 MG: 40 TABLET, DELAYED RELEASE ORAL at 00:10

## 2018-12-14 RX ADMIN — INSULIN ASPART 8 UNITS: 100 INJECTION, SOLUTION INTRAVENOUS; SUBCUTANEOUS at 20:37

## 2018-12-14 RX ADMIN — SODIUM CHLORIDE 125 ML/HR: 900 INJECTION, SOLUTION INTRAVENOUS at 08:20

## 2018-12-14 RX ADMIN — INSULIN ASPART 30 UNITS: 100 INJECTION, SOLUTION INTRAVENOUS; SUBCUTANEOUS at 18:09

## 2018-12-14 RX ADMIN — NEBIVOLOL HYDROCHLORIDE 5 MG: 5 TABLET ORAL at 07:53

## 2018-12-14 RX ADMIN — ISOSORBIDE MONONITRATE 120 MG: 60 TABLET, EXTENDED RELEASE ORAL at 07:53

## 2018-12-14 RX ADMIN — INSULIN ASPART 8 UNITS: 100 INJECTION, SOLUTION INTRAVENOUS; SUBCUTANEOUS at 18:09

## 2018-12-14 RX ADMIN — RANOLAZINE 1000 MG: 500 TABLET, FILM COATED, EXTENDED RELEASE ORAL at 07:54

## 2018-12-14 RX ADMIN — IPRATROPIUM BROMIDE AND ALBUTEROL SULFATE 3 ML: 2.5; .5 SOLUTION RESPIRATORY (INHALATION) at 06:40

## 2018-12-14 RX ADMIN — SODIUM CHLORIDE, PRESERVATIVE FREE 3 ML: 5 INJECTION INTRAVENOUS at 07:55

## 2018-12-14 RX ADMIN — INSULIN ASPART 30 UNITS: 100 INJECTION, SOLUTION INTRAVENOUS; SUBCUTANEOUS at 10:46

## 2018-12-14 RX ADMIN — INSULIN DETEMIR 75 UNITS: 100 INJECTION, SOLUTION SUBCUTANEOUS at 20:37

## 2018-12-14 RX ADMIN — SODIUM CHLORIDE, PRESERVATIVE FREE 3 ML: 5 INJECTION INTRAVENOUS at 20:40

## 2018-12-14 RX ADMIN — SODIUM CHLORIDE 125 ML/HR: 900 INJECTION, SOLUTION INTRAVENOUS at 00:12

## 2018-12-14 RX ADMIN — TRAZODONE HYDROCHLORIDE 300 MG: 150 TABLET ORAL at 00:10

## 2018-12-14 RX ADMIN — LISINOPRIL 40 MG: 40 TABLET ORAL at 00:10

## 2018-12-14 RX ADMIN — RIVAROXABAN 20 MG: 10 TABLET, FILM COATED ORAL at 07:53

## 2018-12-14 RX ADMIN — IPRATROPIUM BROMIDE AND ALBUTEROL SULFATE 3 ML: 2.5; .5 SOLUTION RESPIRATORY (INHALATION) at 15:02

## 2018-12-14 RX ADMIN — INSULIN ASPART 5 UNITS: 100 INJECTION, SOLUTION INTRAVENOUS; SUBCUTANEOUS at 00:13

## 2018-12-14 RX ADMIN — AMLODIPINE BESYLATE 10 MG: 10 TABLET ORAL at 00:10

## 2018-12-14 RX ADMIN — INSULIN ASPART 12 UNITS: 100 INJECTION, SOLUTION INTRAVENOUS; SUBCUTANEOUS at 10:47

## 2018-12-14 RX ADMIN — SODIUM CHLORIDE, PRESERVATIVE FREE 3 ML: 5 INJECTION INTRAVENOUS at 00:12

## 2018-12-14 RX ADMIN — LISINOPRIL 40 MG: 40 TABLET ORAL at 20:39

## 2018-12-14 RX ADMIN — PANTOPRAZOLE SODIUM 40 MG: 40 TABLET, DELAYED RELEASE ORAL at 20:39

## 2018-12-14 RX ADMIN — METHYLPREDNISOLONE SODIUM SUCCINATE 60 MG: 125 INJECTION, POWDER, FOR SOLUTION INTRAMUSCULAR; INTRAVENOUS at 03:18

## 2018-12-14 RX ADMIN — RANOLAZINE 1000 MG: 500 TABLET, FILM COATED, EXTENDED RELEASE ORAL at 00:10

## 2018-12-14 RX ADMIN — RANOLAZINE 1000 MG: 500 TABLET, FILM COATED, EXTENDED RELEASE ORAL at 20:38

## 2018-12-14 RX ADMIN — PRAMIPEXOLE DIHYDROCHLORIDE 1 MG: 1 TABLET ORAL at 20:39

## 2018-12-14 RX ADMIN — NEBIVOLOL HYDROCHLORIDE 5 MG: 5 TABLET ORAL at 20:39

## 2018-12-14 RX ADMIN — NEBIVOLOL HYDROCHLORIDE 5 MG: 5 TABLET ORAL at 00:10

## 2018-12-14 RX ADMIN — SODIUM CHLORIDE 125 ML/HR: 900 INJECTION, SOLUTION INTRAVENOUS at 21:52

## 2018-12-14 RX ADMIN — INSULIN DETEMIR 75 UNITS: 100 INJECTION, SOLUTION SUBCUTANEOUS at 00:14

## 2018-12-14 RX ADMIN — PRASUGREL HYDROCHLORIDE 10 MG: 10 TABLET, FILM COATED ORAL at 07:53

## 2018-12-14 RX ADMIN — PRAMIPEXOLE DIHYDROCHLORIDE 1 MG: 1 TABLET ORAL at 00:10

## 2018-12-14 RX ADMIN — SERTRALINE HYDROCHLORIDE 25 MG: 25 TABLET ORAL at 07:54

## 2018-12-14 RX ADMIN — IPRATROPIUM BROMIDE AND ALBUTEROL SULFATE 3 ML: 2.5; .5 SOLUTION RESPIRATORY (INHALATION) at 18:55

## 2018-12-14 RX ADMIN — FLUTICASONE PROPIONATE 2 SPRAY: 50 SPRAY, METERED NASAL at 10:46

## 2018-12-14 RX ADMIN — INSULIN ASPART 10 UNITS: 100 INJECTION, SOLUTION INTRAVENOUS; SUBCUTANEOUS at 07:55

## 2018-12-14 NOTE — PLAN OF CARE
Problem: Patient Care Overview  Goal: Plan of Care Review  Outcome: Ongoing (interventions implemented as appropriate)   12/14/18 1311   Coping/Psychosocial   Plan of Care Reviewed With patient   Plan of Care Review   Progress no change   OTHER   Outcome Summary Patient has 100% intake of meals. Patient was educated on healthy eating, lifestyle changes, and healthy weight loss. will follow up to see if patient has questions regarding education.

## 2018-12-14 NOTE — H&P
[facility]  INPATIENT HISTORY AND PHYSICAL       Patient Care Team:  Ruslan Gonzalez APRN as PCP - General (Family Medicine)  Froylan Keyes APRN as PCP - Claims Attributed  Gonsalo Hogue MD as Consulting Physician (Hospitalist)    Chief complaint   Chief Complaint   Patient presents with   • Chest Pain   • Shortness of Breath       Subjective     Patient is a 38 y.o. male with history of morbid obesity, diabetes mellitus, coronary artery disease status post PCI, depressive disorder, obstructive sleep apnea, GERD, pulmonary embolism, hypertension, dyslipidemia who presents to the ED secondary to one day history of progressively worsening shortness of air, cough productive of yellowish to greenish sputum, subjective fever and chest wall pain arising from repeated coughing.  He denies hemoptysis , nausea, vomiting, headaches, blurry vision, diaphoresis palpitation, syncope, presyncope or contact with anyone  febrile illness.  He was discharged from this hospital 6 days ago following an admission for pneumonia.  He reports that he was doing fairly well until early this morning when he started having above symptoms.  He had chest x-ray in the ED which was unremarkable and influenza screen was negative.  White count was 10.7 and lactic acid was 3.6.    Patient was started on oxygen, given nebulizer treatment,  IV Solu-Medrol and IV antibiotics.    Family and social history: Patient is  and lives with his wife.  Denies history of alcohol or tobacco use.  There is family history of hypertension, diabetes and heart disease.      Review of Systems   Constitutional: Positive for activity change, appetite change and fever. Negative for chills, diaphoresis and fatigue.   HENT: Negative for trouble swallowing and voice change.    Eyes: Negative for photophobia and visual disturbance.   Respiratory: Positive for cough, shortness of breath and wheezing. Negative  for choking, chest tightness and stridor.    Cardiovascular: Negative for chest pain, palpitations and leg swelling.   Gastrointestinal: Negative for abdominal distention, abdominal pain, blood in stool, constipation, diarrhea, nausea and vomiting.   Endocrine: Negative for cold intolerance, heat intolerance, polydipsia, polyphagia and polyuria.   Genitourinary: Negative for decreased urine volume, difficulty urinating, dysuria, enuresis, flank pain, frequency, hematuria and urgency.   Musculoskeletal: Negative for arthralgias, gait problem, myalgias, neck pain and neck stiffness.   Skin: Negative for pallor, rash and wound.   Neurological: Negative for dizziness, tremors, seizures, syncope, facial asymmetry, speech difficulty, weakness, light-headedness, numbness and headaches.   Hematological: Does not bruise/bleed easily.   Psychiatric/Behavioral: Negative for agitation, behavioral problems and confusion.         History  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HCC)    • Fatty liver    • GERD (gastroesophageal reflux disease)    • Hyperlipidemia    • Hypertension    • Morbid obesity (CMS/HCC)    • Pulmonary embolism (CMS/HCC)    • RLS (restless legs syndrome)    • Seizures (CMS/HCC)     last one many years ago   • Sleep apnea     c-pap     Past Surgical History:   Procedure Laterality Date   • ADENOIDECTOMY     • CHOLECYSTECTOMY      lap   • CORONARY ANGIOPLASTY WITH STENT PLACEMENT     • TONSILLECTOMY     • TRANSESOPHAGEAL ECHOCARDIOGRAM (MONICA)       Family History   Problem Relation Age of Onset   • Heart disease Mother    • Hypertension Mother    • Hyperlipidemia Mother    • Coronary artery disease Mother    • Diabetes Mother    • Obesity Mother    • Kidney failure Mother    • Sleep apnea Father    • Cancer Paternal Grandfather      Social History      Tobacco Use   • Smoking status: Former Smoker     Packs/day: 0.25     Years: 0.50     Pack years: 0.12     Types: Cigarettes     Last attempt to quit:      Years since quittin.9   • Smokeless tobacco: Current User     Types: Snuff   • Tobacco comment: quit smoking 25 years ago; he does use snuff   Substance Use Topics   • Alcohol use: No   • Drug use: No       (Not in a hospital admission)  Allergies:  Glipizide; Reglan [metoclopramide]; Tramadol; and Risperdal [risperidone]  Prior to Admission medications    Medication Sig Start Date End Date Taking? Authorizing Provider   albuterol (ACCUNEB) 1.25 MG/3ML nebulizer solution Take 3 mL by nebulization Every 6 (Six) Hours As Needed for Wheezing. 11/15/17   Tahmina Reid APRN   albuterol (PROVENTIL HFA;VENTOLIN HFA) 108 (90 BASE) MCG/ACT inhaler Inhale 2 puffs Every 4 (Four) Hours As Needed for Wheezing.    ProviderLatrice MD   amLODIPine (NORVASC) 10 MG tablet Take 1 tablet by mouth Every Night. 11/10/17   Earnest Perez MD   BYSTOLIC 10 MG tablet 5 mg 2 (Two) Times a Day. 10/4/18   ProviderLatrice MD   cefdinir (OMNICEF) 300 MG capsule Take 1 capsule by mouth 2 (Two) Times a Day for 3 days. 12/10/18 12/13/18  Lavonne Knight APRN   glucose monitor monitoring kit 1 each As Needed (check blood glucose 3x daily). 18   Froylan Keyes APRN   insulin aspart (novoLOG) 100 UNIT/ML injection Inject 25 Units under the skin into the appropriate area as directed 3 (Three) Times a Day Before Meals.  Patient taking differently: Inject 30 Units under the skin into the appropriate area as directed 3 (Three) Times a Day Before Meals. 18   Suki Sharma APRN   insulin degludec (TRESIBA FLEXTOUCH) 100 UNIT/ML solution pen-injector injection Inject 75 Units under the skin into the appropriate area as directed Every Night.    ProviderLatrice MD   isosorbide mononitrate (IMDUR) 120 MG 24 hr tablet Take 1 tablet by mouth Daily.  Patient  "taking differently: Take 120 mg by mouth Every Night. 9/10/18   Suki Sharma APRN   lisinopril (PRINIVIL,ZESTRIL) 40 MG tablet Take 1 tablet by mouth Every Morning.  Patient taking differently: Take 40 mg by mouth Every Night. 11/10/17   Earnest Perez MD   MICROLET LANCETS misc One touch delica lancets 4/2/18   Froylan Keyes APRN   nitroglycerin (NITROSTAT) 0.4 MG SL tablet Place 1 tablet under the tongue Every 5 (Five) Minutes As Needed for Chest Pain. Take no more than 3 doses in 15 minutes. 11/10/17   Earnest Perez MD   pantoprazole (PROTONIX) 40 MG EC tablet Take 1 tablet by mouth Every Night. 11/10/17   Earnest Perez MD   pramipexole (MIRAPEX) 1 MG tablet TAKE TWO TABLETS BY MOUTH EVERY NIGHT 6/21/18   Earnest Perez MD   prasugrel (EFFIENT) 10 MG tablet Take 10 mg by mouth. 9/13/18   Latrice Bolaños MD   ranolazine (RANEXA) 1000 MG 12 hr tablet Take 1 tablet by mouth Every 12 (Twelve) Hours. 11/10/17   Earnest Perez MD   RELION INSULIN SYRINGE 31G X 15/64\" 0.5 ML misc  1/18/18   Latrice Bolaños MD   rivaroxaban (XARELTO) 20 MG tablet Take 1 tablet by mouth Daily.  Patient taking differently: Take 20 mg by mouth Daily With Dinner. 2/2/18   Letty Paige APRN   sertraline (ZOLOFT) 25 MG tablet Take 1 tablet by mouth Daily.  Patient taking differently: Take 25 mg by mouth Every Night. 11/10/17   Earnest Perez MD   traZODone (DESYREL) 300 MG tablet TAKE ONE TABLET BY MOUTH EVERY NIGHT 6/21/18   Earnest Perez MD       Objective        Vital Signs  Temp:  [99.3 °F (37.4 °C)] 99.3 °F (37.4 °C)  Heart Rate:  [70-87] 84  Resp:  [18-36] 18  BP: (161-217)/() 175/78      Physical Exam   Constitutional: He is oriented to person, place, and time. He appears well-developed and well-nourished. No distress.   Patient is morbidly obese with a BMI of 64.71   HENT:   Head: Normocephalic and atraumatic.   Eyes: EOM are normal. Pupils are equal, round, and " reactive to light. No scleral icterus.   Neck: Normal range of motion. Neck supple. No JVD present. No thyromegaly present.   Cardiovascular: Normal rate, regular rhythm and normal heart sounds. Exam reveals no gallop and no friction rub.   No murmur heard.  Pulmonary/Chest: Effort normal. He has decreased breath sounds. He has wheezes. He has rhonchi. He has no rales. He exhibits no tenderness.   Abdominal: Soft. Bowel sounds are normal. He exhibits no distension and no mass. There is no tenderness. There is no rebound and no guarding.   Musculoskeletal: He exhibits no edema, tenderness or deformity.   Neurological: He is alert and oriented to person, place, and time. No cranial nerve deficit. He exhibits normal muscle tone. Coordination normal.   Skin: Skin is warm and dry. No rash noted. He is not diaphoretic. No erythema. No pallor.   Psychiatric: He has a normal mood and affect. His behavior is normal. Judgment and thought content normal.   Nursing note and vitals reviewed.        Results Review:     Results from last 7 days   Lab Units  12/13/18   1907  12/10/18   0917  12/09/18   0741  12/08/18   0517  12/07/18   2028  12/07/18   0836  12/06/18   2129   SODIUM mmol/L  130*  131*  133*  132*   --   133*  128*   POTASSIUM mmol/L  3.6  4.3  4.6  5.4*   --   4.5  3.8   CHLORIDE mmol/L  94*  97  101  99   --   93*  91*   CO2 mmol/L  28.0  26.0  23.0  24.0   --   24.0  28.0   BUN mg/dL  9  19  17  24*   --   14  9   CREATININE mg/dL  0.64*  0.82  0.65*  0.78   --   0.57*  0.57*   GLUCOSE mg/dL  363*  494*  353*  400*  413*  486*  311*   CALCIUM mg/dL  8.0*  8.3*  8.5  9.0   --   9.3  9.1   BILIRUBIN mg/dL  0.3  0.3  0.2  0.4   --   0.7   --    ALK PHOS U/L  70  75  70  77   --   102   --    ALT (SGPT) U/L  41  19*  21  27   --   25   --    AST (SGOT) U/L  36  19  14*  21   --   30   --        Results from last 7 days   Lab Units  12/06/18   2129   MAGNESIUM mg/dL  1.9       Results from last 7 days   Lab Units   12/13/18   1907  12/10/18   0602  12/09/18   0514  12/08/18   0517  12/07/18   0836  12/06/18 2129   WBC 10*3/mm3  10.70*  14.13*  15.32*  12.56*  11.52*  8.82   HEMOGLOBIN g/dL  13.4*  12.3*  12.5*  12.3*  14.3  13.8   HEMATOCRIT %  39.4  37.8*  38.8*  37.3*  42.5  39.7   PLATELETS 10*3/mm3  201  223  227  235  189  167       Results from last 7 days   Lab Units  12/06/18 2129   INR   0.97     Imaging Results (last 7 days)     Procedure Component Value Units Date/Time    XR Chest 2 View [[id number]] Collected:  12/13/18 1935     Updated:  12/13/18 1959    Narrative:         TWO VIEW CHEST    HISTORY: Shortness of air. Pain with breathing.    Frontal and lateral films of the chest were obtained.  COMPARISON: December 6, 2018    The lungs are clear of an acute process.  The heart is not enlarged.  The pulmonary vasculature is not increased.  No pleural effusion.  No pneumothorax.  No acute osseous abnormality.      Impression:       CONCLUSION:  No Acute Disease    [id number]    Electronically signed by:  Kulwant Currie MD  12/13/2018 7:58 PM  CST Workstation: [id number]          Assessment / Plan       Hospital Problem List:  Active Problems:  - Possible sepsis secondary to acute bronchitis versus viral syndrome: Continue supplemental oxygen, nebulizers, IV steroids, IV antibiotics, IV hydration and trend lactic acid level.  Blood cultures have been drawn and the results are pending.  Check respiratory cultures.  -Hypertension: Continue home medications with adjustments as needed to achieve optimal blood pressure control.  - Diabetes mellitus: Continue basal and mealtime insulin with Accu-Cheks and sliding scale insulin.  - History of pulmonary embolism: Continue Xarelto.  - Hyponatremia (likely hypovolemic): Hydrate with isotonic saline with freewater restriction.  Monitor sodium levels.    - Coronary artery disease status post PCI: Continue home medications including dual antiplatelet therapy.  - Obstructive sleep  apnea: Continue nightly CPAP.  - Continue PPI for GERD.  - Additional orders or treatment plan as Hospital course dictates.    I discussed the patient's findings and my recommendations with patient.     Melchor Blackwell MD  12/13/18  9:20 PM        EMR Dragon/Transcription disclaimer:   Much of this encounter note is an electronic transcription/translation of spoken language to printed text. The electronic translation of spoken language may permit erroneous, or at times, nonsensical words or phrases to be inadvertently transcribed; Although I have reviewed the note for such errors, some may still exist.

## 2018-12-14 NOTE — CONSULTS
Adult Nutrition  Assessment    Patient Name:  Yordy Hansen  YOB: 1980  MRN: [id number]  Admit Date:  12/13/2018    Assessment Date:  12/14/2018    Comments:  Patient admitted to the hospital d/t bronchitis and infection in the bloodstream. Patient has stage 3 obesity with a BMI of 64.63. Patient reported that he usually eats ~2 meals/day when at home. Patient was educated on making healthy choices, eating 3 meals/day, cutting out sugary beverages, eating more fruits, vegetables, and whole grains. Patient was educated on healthy weight loss. Patient verbalized understanding. Will follow up to see if patient has questions regarding education.     Reason for Assessment     Row Name 12/14/18 1259          Reason for Assessment    Reason For Assessment  per organizational policy  (Pended)      Identified At Risk by Screening Criteria  BMI  (Pended)          Nutrition/Diet History     Row Name 12/14/18 1259          Nutrition/Diet History    Typical Food/Fluid Intake  Patients eats 2 meals/day at home.   (Pended)            Labs/Tests/Procedures/Meds     Row Name 12/14/18 1300          Labs/Procedures/Meds    Lab Results Reviewed  reviewed, pertinent  (Pended)      Lab Results Comments  Na+-136, Glucose-334  (Pended)         Diagnostic Tests/Procedures    Diagnostic Test/Procedure Reviewed  reviewed  (Pended)         Medications    Pertinent Medications Reviewed  reviewed, pertinent  (Pended)          Physical Findings     Row Name 12/14/18 1301          Physical Findings    Overall Physical Appearance  obese  (Pended)          Estimated/Assessed Needs     Row Name 12/14/18 1303 12/14/18 1301       Calculation Measurements    Weight Used For Calculations  210 kg (462 lb 15.5 oz)  (Pended)   109 kg (240 lb)  (Pended)        Estimated/Assessed Needs    Additional Documentation  --  [place]-St. Jeor Equation (Group);Calorie Requirements (Group);Fluid Requirements (Group);KCAL/KG (Group);Protein  Requirements (Group)  (Pended)        Calorie Requirements    Weight Used For Calorie Calculations  --  109 kg (240 lb)  (Pended)     Estimated Calorie Requirement (kcal/day)  --  2300  (Pended)        KCAL/KG    14 Kcal/Kg (kcal)  2940  (Pended)   1524.08  (Pended)     15 Kcal/Kg (kcal)  3150  (Pended)   1632.95  (Pended)     18 Kcal/Kg (kcal)  3780  (Pended)   1959.53  (Pended)     20 Kcal/Kg (kcal)  4200  (Pended)   2177.26  (Pended)     25 Kcal/Kg (kcal)  5250  (Pended)   2721.58  (Pended)     30 Kcal/Kg (kcal)  6300  (Pended)   3265.89  (Pended)     35 Kcal/Kg (kcal)  7350  (Pended)   3810.21  (Pended)     40 Kcal/Kg (kcal)  8400  (Pended)   4354.52  (Pended)     45 Kcal/Kg (kcal)  9450  (Pended)   4898.84  (Pended)     50 Kcal/Kg (kcal)  54298  (Pended)   5443.15  (Pended)        [place]-St. Jeor Equation    RMR ([place]-St. Jeor Equation)  3032.13  (Pended)   2020.76  (Pended)        Protein Requirements    Weight Used For Protein Calculations  --  109 kg (240 lb)  (Pended)     Est Protein Requirement Amount (gms/kg)  --  0.8 gm protein  (Pended)     Estimated Protein Requirements (gms/day)  --  87.09  (Pended)        Fluid Requirements    Estimated Fluid Requirements (mL/day)  --  2300  (Pended)     RDA Method (mL)  --  2300  (Pended)     Amelia-Bharti Method (over 20 kg)  5700  (Pended)   3677.26  (Pended)         Nutrition Prescription Ordered     Row Name 12/14/18 1303          Nutrition Prescription PO    Current PO Diet  Regular  (Pended)      Fluid Consistency  Thin  (Pended)      Common Modifiers  Cardiac;Consistent Carbohydrate  (Pended)          Evaluation of Received Nutrient/Fluid Intake     Row Name 12/14/18 1303 12/14/18 1301       Calculation Measurements    Weight Used For Calculations  210 kg (462 lb 15.5 oz)  (Pended)   109 kg (240 lb)  (Pended)        PO Evaluation    Number of Days PO Intake Evaluated  1 day  (Pended)   --    Number of Meals  1  (Pended)   --    % PO Intake  100%   (Pended)   --        Evaluation of Prescribed Nutrient/Fluid Intake     Row Name 12/14/18 1303 12/14/18 1301       Calculation Measurements    Weight Used For Calculations  210 kg (462 lb 15.5 oz)  (Pended)   109 kg (240 lb)  (Pended)             Electronically signed by:  Nasima Alex  12/14/18 1:04 PM

## 2018-12-14 NOTE — ED NOTES
Patient is here today for chest pain, hurts mostly when breathing in.  He was recently hospitalized here at [facility] for pneumonia and states that it is the same pain.  Pain started back yesterday.     Judy Shannon RN  12/13/18 8945

## 2018-12-14 NOTE — OUTREACH NOTE
COPD/PN Week 2 Survey      Responses   Facility patient discharged from?  [place]   Does the patient have one of the following disease processes/diagnoses(primary or secondary)?  COPD/Pneumonia   Was the primary reason for admission:  Pneumonia   Week 2 attempt successful?  No   Revoke  Readmitted          Addie Campbell RN

## 2018-12-14 NOTE — PROGRESS NOTES
[facility] Medicine Services  INPATIENT PROGRESS NOTE    Length of Stay: 1  Date of Admission: 12/13/2018  Primary Care Physician: Ruslan Gonzalez APRN    Subjective   Chief Complaint: Shortness of breath  HPI:  38 year old male with a history of morbid obesity, diabetes mellitus, coronary artery disease status post PCI, depressive disorder, obstructive sleep apnea, GERD, pulmonary embolism, hypertension, and dyslipidemia who presents with shortness of breath, congestion, cough, subjective fever, and chest wall pain with coughing.  He was found to have a clear chest x-ray.  Lactic acid was 3.6.  It has improved with IV fluid to 2.0.  He was admitted on O2, nebulizers, steroids, antibiotics, and hydration.  Blood glucose is elevated.  He is feeling better and on room air.     Review of Systems   Constitutional: Negative for chills and fever.   HENT: Positive for congestion.    Respiratory: Positive for cough. Negative for shortness of breath.    Cardiovascular: Negative for chest pain.   Gastrointestinal: Negative for abdominal pain, diarrhea, nausea and vomiting.      All pertinent negatives and positives are as above. All other systems have been reviewed and are negative unless otherwise stated.     Objective    Temp:  [96.9 °F (36.1 °C)-99.3 °F (37.4 °C)] 98.4 °F (36.9 °C)  Heart Rate:  [61-87] 76  Resp:  [18-36] 20  BP: (146-217)/() 169/76    Physical Exam   Constitutional: He is oriented to person, place, and time. He appears well-developed and well-nourished.   HENT:   Head: Normocephalic.   Eyes: Conjunctivae are normal.   Cardiovascular: Normal rate, regular rhythm, normal heart sounds and intact distal pulses.   Pulmonary/Chest: Effort normal and breath sounds normal. No stridor. No respiratory distress. He has no wheezes.   Abdominal: Soft. Bowel sounds are normal. He exhibits no distension. There is no tenderness.   Musculoskeletal: Normal range of motion. He  exhibits no edema.   Neurological: He is alert and oriented to person, place, and time.   Skin: Skin is warm and dry. He is not diaphoretic. No erythema.   Vitals reviewed.    Results Review:  I have reviewed the labs, radiology results, and diagnostic studies.    Laboratory Data:   Results from last 7 days   Lab Units  12/14/18   0609  12/13/18   1907  12/10/18   0917  12/09/18   0741   SODIUM mmol/L  136*  130*  131*  133*   POTASSIUM mmol/L  4.8  3.6  4.3  4.6   CHLORIDE mmol/L  101  94*  97  101   CO2 mmol/L  23.0  28.0  26.0  23.0   BUN mg/dL  11  9  19  17   CREATININE mg/dL  0.60*  0.64*  0.82  0.65*   GLUCOSE mg/dL  419*  363*  494*  353*   CALCIUM mg/dL  8.6  8.0*  8.3*  8.5   BILIRUBIN mg/dL   --   0.3  0.3  0.2   ALK PHOS U/L   --   70  75  70   ALT (SGPT) U/L   --   41  19*  21   AST (SGOT) U/L   --   36  19  14*   ANION GAP mmol/L  12.0  8.0  8.0  9.0     Estimated Creatinine Clearance: 298.6 mL/min (A) (by C-G formula based on SCr of 0.6 mg/dL (L)).          Results from last 7 days   Lab Units  12/14/18   0609  12/13/18   1907  12/10/18   0602  12/09/18   0514  12/08/18   0517   WBC 10*3/mm3  15.94*  10.70*  14.13*  15.32*  12.56*   HEMOGLOBIN g/dL  14.3  13.4*  12.3*  12.5*  12.3*   HEMATOCRIT %  42.9  39.4  37.8*  38.8*  37.3*   PLATELETS 10*3/mm3  195  201  223  227  235           Culture Data:   Blood Culture   Date Value Ref Range Status   12/13/2018 No growth at less than 24 hours  Preliminary   12/13/2018 No growth at less than 24 hours  Preliminary     No results found for: URINECX  No results found for: RESPCX  No results found for: WOUNDCX  No results found for: STOOLCX  No components found for: BODYFLD    Radiology Data:   Imaging Results (last 24 hours)     Procedure Component Value Units Date/Time    XR Chest 2 View [[id number]] Collected:  12/13/18 1935     Updated:  12/13/18 1959    Narrative:         TWO VIEW CHEST    HISTORY: Shortness of air. Pain with breathing.    Frontal and  lateral films of the chest were obtained.  COMPARISON: December 6, 2018    The lungs are clear of an acute process.  The heart is not enlarged.  The pulmonary vasculature is not increased.  No pleural effusion.  No pneumothorax.  No acute osseous abnormality.      Impression:       CONCLUSION:  No Acute Disease    [id number]    Electronically signed by:  Kulwant Currie MD  12/13/2018 7:58 PM  CST Workstation: [id number]          I have reviewed the patient's current medications.     Assessment/Plan     Active Hospital Problems    Diagnosis   • **Bronchitis   • Lactic acidosis   • URI (upper respiratory infection)   • Diabetes mellitus (CMS/HCC)   • Morbid obesity (CMS/HCC)   • Chronic pulmonary embolism (CMS/HCC)   • Hypertension   • ADOLFO on CPAP       Plan:    D/C antibiotics  Lactic acid has improved  Taper steroids to prednisone  Duonebs, symbicort  IV fluid: NS @ 125 ml/hr  Glucose control: levemir, prandial and SSI novolog  Continue chronic anticoagulation with xarelto          This document has been electronically signed by LALA Larry on December 14, 2018 10:19 AM

## 2018-12-15 VITALS
HEART RATE: 58 BPM | TEMPERATURE: 97.7 F | SYSTOLIC BLOOD PRESSURE: 152 MMHG | RESPIRATION RATE: 18 BRPM | HEIGHT: 71 IN | BODY MASS INDEX: 44.1 KG/M2 | WEIGHT: 315 LBS | DIASTOLIC BLOOD PRESSURE: 78 MMHG | OXYGEN SATURATION: 95 %

## 2018-12-15 LAB
ANION GAP SERPL CALCULATED.3IONS-SCNC: 8 MMOL/L (ref 5–15)
BASOPHILS # BLD AUTO: 0.01 10*3/MM3 (ref 0–0.2)
BASOPHILS NFR BLD AUTO: 0.1 % (ref 0–2)
BUN BLD-MCNC: 11 MG/DL (ref 7–21)
BUN/CREAT SERPL: 18.3 (ref 7–25)
CALCIUM SPEC-SCNC: 8.1 MG/DL (ref 8.4–10.2)
CHLORIDE SERPL-SCNC: 101 MMOL/L (ref 95–110)
CO2 SERPL-SCNC: 26 MMOL/L (ref 22–31)
CREAT BLD-MCNC: 0.6 MG/DL (ref 0.7–1.3)
DEPRECATED RDW RBC AUTO: 45.2 FL (ref 35.1–43.9)
EOSINOPHIL # BLD AUTO: 0.24 10*3/MM3 (ref 0–0.7)
EOSINOPHIL NFR BLD AUTO: 1.6 % (ref 0–7)
ERYTHROCYTE [DISTWIDTH] IN BLOOD BY AUTOMATED COUNT: 14.9 % (ref 11.5–14.5)
GFR SERPL CREATININE-BSD FRML MDRD: 149 ML/MIN/1.73 (ref 63–147)
GLUCOSE BLD-MCNC: 237 MG/DL (ref 60–100)
GLUCOSE BLDC GLUCOMTR-MCNC: 299 MG/DL (ref 70–130)
HCT VFR BLD AUTO: 38.2 % (ref 39–49)
HGB BLD-MCNC: 12.6 G/DL (ref 13.7–17.3)
IMM GRANULOCYTES # BLD: 0.47 10*3/MM3 (ref 0–0.02)
IMM GRANULOCYTES NFR BLD: 3.2 % (ref 0–0.5)
LYMPHOCYTES # BLD AUTO: 2.69 10*3/MM3 (ref 0.6–4.2)
LYMPHOCYTES NFR BLD AUTO: 18.5 % (ref 10–50)
MCH RBC QN AUTO: 27.5 PG (ref 26.5–34)
MCHC RBC AUTO-ENTMCNC: 33 G/DL (ref 31.5–36.3)
MCV RBC AUTO: 83.2 FL (ref 80–98)
MONOCYTES # BLD AUTO: 1.26 10*3/MM3 (ref 0–0.9)
MONOCYTES NFR BLD AUTO: 8.6 % (ref 0–12)
NEUTROPHILS # BLD AUTO: 9.9 10*3/MM3 (ref 2–8.6)
NEUTROPHILS NFR BLD AUTO: 68 % (ref 37–80)
NRBC BLD MANUAL-RTO: 0 /100 WBC (ref 0–0)
PLATELET # BLD AUTO: 156 10*3/MM3 (ref 150–450)
PMV BLD AUTO: 9.5 FL (ref 8–12)
POTASSIUM BLD-SCNC: 3.9 MMOL/L (ref 3.5–5.1)
RBC # BLD AUTO: 4.59 10*6/MM3 (ref 4.37–5.74)
SODIUM BLD-SCNC: 135 MMOL/L (ref 137–145)
WBC NRBC COR # BLD: 14.57 10*3/MM3 (ref 3.2–9.8)

## 2018-12-15 PROCEDURE — 63710000001 PREDNISONE PER 1 MG: Performed by: NURSE PRACTITIONER

## 2018-12-15 PROCEDURE — 63710000001 INSULIN ASPART PER 5 UNITS: Performed by: NURSE PRACTITIONER

## 2018-12-15 PROCEDURE — 80048 BASIC METABOLIC PNL TOTAL CA: CPT | Performed by: INTERNAL MEDICINE

## 2018-12-15 PROCEDURE — 85025 COMPLETE CBC W/AUTO DIFF WBC: CPT | Performed by: INTERNAL MEDICINE

## 2018-12-15 PROCEDURE — 94799 UNLISTED PULMONARY SVC/PX: CPT

## 2018-12-15 PROCEDURE — 63710000001 INSULIN ASPART PER 5 UNITS: Performed by: INTERNAL MEDICINE

## 2018-12-15 PROCEDURE — G0378 HOSPITAL OBSERVATION PER HR: HCPCS

## 2018-12-15 PROCEDURE — 94760 N-INVAS EAR/PLS OXIMETRY 1: CPT

## 2018-12-15 PROCEDURE — 82962 GLUCOSE BLOOD TEST: CPT

## 2018-12-15 RX ORDER — PREDNISONE 20 MG/1
40 TABLET ORAL
Qty: 8 TABLET | Refills: 0 | Status: SHIPPED | OUTPATIENT
Start: 2018-12-16 | End: 2018-12-20

## 2018-12-15 RX ORDER — BUDESONIDE AND FORMOTEROL FUMARATE DIHYDRATE 160; 4.5 UG/1; UG/1
2 AEROSOL RESPIRATORY (INHALATION)
Qty: 6 G | Refills: 12
Start: 2018-12-15 | End: 2020-09-29

## 2018-12-15 RX ORDER — FLUTICASONE PROPIONATE 50 MCG
2 SPRAY, SUSPENSION (ML) NASAL DAILY
Start: 2018-12-16 | End: 2019-05-14

## 2018-12-15 RX ADMIN — SERTRALINE HYDROCHLORIDE 25 MG: 25 TABLET ORAL at 08:16

## 2018-12-15 RX ADMIN — SODIUM CHLORIDE, PRESERVATIVE FREE 3 ML: 5 INJECTION INTRAVENOUS at 08:16

## 2018-12-15 RX ADMIN — IPRATROPIUM BROMIDE AND ALBUTEROL SULFATE 3 ML: 2.5; .5 SOLUTION RESPIRATORY (INHALATION) at 07:40

## 2018-12-15 RX ADMIN — RIVAROXABAN 20 MG: 10 TABLET, FILM COATED ORAL at 08:15

## 2018-12-15 RX ADMIN — INSULIN ASPART 8 UNITS: 100 INJECTION, SOLUTION INTRAVENOUS; SUBCUTANEOUS at 08:10

## 2018-12-15 RX ADMIN — RANOLAZINE 1000 MG: 500 TABLET, FILM COATED, EXTENDED RELEASE ORAL at 08:14

## 2018-12-15 RX ADMIN — NEBIVOLOL HYDROCHLORIDE 5 MG: 5 TABLET ORAL at 08:14

## 2018-12-15 RX ADMIN — BUDESONIDE AND FORMOTEROL FUMARATE DIHYDRATE 2 PUFF: 160; 4.5 AEROSOL RESPIRATORY (INHALATION) at 07:48

## 2018-12-15 RX ADMIN — PREDNISONE 40 MG: 20 TABLET ORAL at 08:14

## 2018-12-15 RX ADMIN — ISOSORBIDE MONONITRATE 120 MG: 60 TABLET, EXTENDED RELEASE ORAL at 08:15

## 2018-12-15 RX ADMIN — FLUTICASONE PROPIONATE 2 SPRAY: 50 SPRAY, METERED NASAL at 08:14

## 2018-12-15 RX ADMIN — PRASUGREL HYDROCHLORIDE 10 MG: 10 TABLET, FILM COATED ORAL at 08:15

## 2018-12-15 RX ADMIN — INSULIN ASPART 30 UNITS: 100 INJECTION, SOLUTION INTRAVENOUS; SUBCUTANEOUS at 08:10

## 2018-12-15 NOTE — PLAN OF CARE
Problem: Patient Care Overview  Goal: Plan of Care Review  Outcome: Ongoing (interventions implemented as appropriate)   12/15/18 0448   Coping/Psychosocial   Plan of Care Reviewed With patient   Plan of Care Review   Progress improving   OTHER   Outcome Summary pt vs stable fsbs coming down, no new events will continue to monitor.     Goal: Individualization and Mutuality  Outcome: Ongoing (interventions implemented as appropriate)    Goal: Discharge Needs Assessment  Outcome: Ongoing (interventions implemented as appropriate)    Goal: Interprofessional Rounds/Family Conf  Outcome: Ongoing (interventions implemented as appropriate)      Problem: Sepsis/Septic Shock (Adult)  Goal: Signs and Symptoms of Listed Potential Problems Will be Absent, Minimized or Managed (Sepsis/Septic Shock)  Outcome: Outcome(s) achieved Date Met: 12/15/18      Problem: Pain, Chronic (Adult)  Goal: Identify Related Risk Factors and Signs and Symptoms  Outcome: Ongoing (interventions implemented as appropriate)    Goal: Acceptable Pain/Comfort Level and Functional Ability  Outcome: Ongoing (interventions implemented as appropriate)

## 2018-12-15 NOTE — DISCHARGE SUMMARY
[facility] Medicine Services  DISCHARGE SUMMARY       Date of Admission: 12/13/2018  Date of Discharge:  12/15/2018  Primary Care Physician: Ruslan Gonzalez APRN    Presenting Problem/History of Present Illness:  Sepsis (CMS/HCC) [A41.9]  Bronchitis [J40]       Final Discharge Diagnoses:    Bronchitis    ADOLFO on CPAP    Hypertension    Chronic pulmonary embolism (CMS/HCC)    Morbid obesity (CMS/HCC)    Diabetes mellitus (CMS/HCC)    Lactic acidosis    URI (upper respiratory infection)      Consults:   Consults     Date and Time Order Name Status Description    12/13/2018 2115 Hospitalist (on-call MD unless specified)              Pertinent Test Results:   Xr Chest 2 View    Result Date: 12/13/2018  TWO VIEW CHEST HISTORY: Shortness of air. Pain with breathing. Frontal and lateral films of the chest were obtained. COMPARISON: December 6, 2018 The lungs are clear of an acute process. The heart is not enlarged. The pulmonary vasculature is not increased. No pleural effusion. No pneumothorax. No acute osseous abnormality.     CONCLUSION: No Acute Disease [id number] Electronically signed by:  Kulwant Currie MD  12/13/2018 7:58 PM CST Workstation: [id number]    Xr Chest 2 View    Result Date: 12/6/2018  PROCEDURE: XR CHEST 2 VW CLINICAL HISTORY: soa INDICATION: Same as above COMPARISON: 11/3/2018 TECHNIQUE: PA and and lateral chest radiographs were obtained. FINDINGS: There is minimal right lower lobe infiltrate/atelectasis  There are no pneumothoraces or pleural effusions. The pulmonary vascularity is normal The cardiomediastinal silhouette is stable.     There is minimal right lower lobe infiltrate/atelectasis  Electronically signed by:  Gray Chery MD  12/6/2018 9:20 PM CST Workstation: [id number]    Ct Chest With Contrast    Result Date: 12/9/2018  CT chest with contrast. CLINICAL INDICATION: Shortness of breath. Pneumonia, follow-up.  COMPARISON: Chest x-ray December 6, 2018.    "TECHNIQUE: Nonionic IV contrast. Helical scanning with axial and coronal reformations. Soft tissue, lung, liver, and bone windows reviewed. This exam was performed according to our departmental dose-optimization program, which includes automated exposure control, adjustment of the mA and/or kV according to patient size and/or use of iterative reconstruction technique. CHEST CT FINDINGS:  Lung fields are clear. No masses or    No evidence of pathologically enlarged nodes. Normal aorta.    No evidence of pleural fluid. The adrenal glands are normal size. Remainder of visualized upper abdomen is grossly unremarkable. Degenerative changes of the spine. Bones unremarkable for age.     CONCLUSION: Unremarkable CT chest with contrast.    Electronically signed by:  Al Rodriguez MD  12/9/2018 2:04 PM CST Workstation: [id number]      HPI/Hospital Course:  The patient is a 38 y.o. male with a history of morbid obesity, diabetes mellitus, coronary artery disease status post PCI, depressive disorder, obstructive sleep apnea, GERD, pulmonary embolism, hypertension, and dyslipidemia who presented to [facility] with shortness of breath, congestion, cough, subjective fever, and chest wall pain with coughing.  He was found to have a clear chest x-ray.  Lactic acid was 3.6.  It has improved with IV fluid to 2.0.  He was admitted on O2, nebulizers, steroids, antibiotics, and hydration. He has improved symptomatically.  He will complete a course of prednisone.  He is stable and discharged to home.         Condition on Discharge:  Stable    Physical Exam on Discharge:  /78 (BP Location: Left arm, Patient Position: Sitting)   Pulse 58   Temp 97.7 °F (36.5 °C) (Tympanic)   Resp 18   Ht 180.3 cm (71\")   Wt (!) 218 kg (479 lb 12.8 oz)   SpO2 95%   BMI 66.92 kg/m²   Physical Exam   Constitutional: He is oriented to person, place, and time. He appears well-developed and well-nourished. No distress.   Obese   HENT: "   Head: Normocephalic and atraumatic.   Eyes: Conjunctivae are normal.   Cardiovascular: Normal rate, regular rhythm, normal heart sounds and intact distal pulses.   Pulmonary/Chest: Effort normal. No respiratory distress. He has decreased breath sounds.   Abdominal: Soft. Bowel sounds are normal. He exhibits no distension. There is no tenderness.   Musculoskeletal: Normal range of motion. He exhibits no edema.   Neurological: He is alert and oriented to person, place, and time.   Skin: Skin is warm and dry. He is not diaphoretic.   Vitals reviewed.        Discharge Disposition:  Home or Self Care    Discharge Medications:     Discharge Medications      New Medications      Instructions Start Date   budesonide-formoterol 160-4.5 MCG/ACT inhaler  Commonly known as:  SYMBICORT   2 puffs, Inhalation, 2 Times Daily - RT      fluticasone 50 MCG/ACT nasal spray  Commonly known as:  FLONASE   2 sprays, Each Nare, Daily      predniSONE 20 MG tablet  Commonly known as:  DELTASONE   40 mg, Oral, Daily With Breakfast         Changes to Medications      Instructions Start Date   insulin aspart 100 UNIT/ML injection  Commonly known as:  novoLOG  What changed:  how much to take   25 Units, Subcutaneous, 3 Times Daily Before Meals      isosorbide mononitrate 120 MG 24 hr tablet  Commonly known as:  IMDUR  What changed:  when to take this   120 mg, Oral, Daily      lisinopril 40 MG tablet  Commonly known as:  PRINIVIL,ZESTRIL  What changed:  when to take this   40 mg, Oral, Every Morning      rivaroxaban 20 MG tablet  Commonly known as:  XARELTO  What changed:  when to take this   20 mg, Oral, Daily      sertraline 25 MG tablet  Commonly known as:  ZOLOFT  What changed:  when to take this   25 mg, Oral, Daily         Continue These Medications      Instructions Start Date   albuterol 108 (90 Base) MCG/ACT inhaler  Commonly known as:  PROVENTIL HFA;VENTOLIN HFA;PROAIR HFA   2 puffs, Inhalation, Every 4 Hours PRN      albuterol 1.25  "MG/3ML nebulizer solution  Commonly known as:  ACCUNEB   1 ampule, Nebulization, Every 6 Hours PRN      amLODIPine 10 MG tablet  Commonly known as:  NORVASC   10 mg, Oral, Nightly      BYSTOLIC 10 MG tablet  Generic drug:  nebivolol   5 mg, 2 Times Daily      glucose monitor monitoring kit   1 each, Does not apply, As Needed      MICROLET LANCETS misc   One touch delica lancets      nitroglycerin 0.4 MG SL tablet  Commonly known as:  NITROSTAT   0.4 mg, Sublingual, Every 5 Minutes PRN, Take no more than 3 doses in 15 minutes.      pantoprazole 40 MG EC tablet  Commonly known as:  PROTONIX   40 mg, Oral, Nightly      pramipexole 1 MG tablet  Commonly known as:  MIRAPEX   TAKE TWO TABLETS BY MOUTH EVERY NIGHT      prasugrel 10 MG tablet  Commonly known as:  EFFIENT   10 mg, Oral      ranolazine 1000 MG 12 hr tablet  Commonly known as:  RANEXA   1,000 mg, Oral, Every 12 Hours Scheduled      RELION INSULIN SYRINGE 31G X 15/64\" 0.5 ML misc  Generic drug:  Insulin Syringe-Needle U-100   No dose, route, or frequency recorded.      traZODone 300 MG tablet  Commonly known as:  DESYREL   TAKE ONE TABLET BY MOUTH EVERY NIGHT      TRESIBA FLEXTOUCH 100 UNIT/ML solution pen-injector injection  Generic drug:  insulin degludec   75 Units, Subcutaneous, Nightly         Stop These Medications    cefdinir 300 MG capsule  Commonly known as:  OMNICEF            Discharge Diet:   Diet Instructions     Diet: Consistent Carbohydrate, Cardiac, Regular; Thin      Discharge Diet:   Consistent Carbohydrate  Cardiac  Regular       Fluid Consistency:  Thin          Activity at Discharge:   Activity Instructions     Activity as Tolerated            Follow-up Appointments:   1. PCP one week  Future Appointments   Date Time Provider Department Center   8/1/2019  1:00 PM Antony Jenkins MD PhD [facility] None       Test Results Pending at Discharge:    Order Current Status    Blood Culture - Blood, Arm, Left Preliminary result    Blood " Culture - Blood, Hand, Right Preliminary result          Artur Santana, LALA  12/15/18  10:25 AM    Time: Discharge planning and teaching took greater than 30 minutes.

## 2018-12-16 ENCOUNTER — READMISSION MANAGEMENT (OUTPATIENT)
Dept: CALL CENTER | Facility: HOSPITAL | Age: 38
End: 2018-12-16

## 2018-12-16 NOTE — OUTREACH NOTE
Prep Survey      Responses   Facility patient discharged from?  [facility]   Is patient eligible?  Yes   Discharge diagnosis  Brochitis, Sepsis on admission,ADOLFO on CPAP, HTN, Chronic pulmonary embolism, morbid obesity, DM, lactic acidosis, URI   Does the patient have one of the following disease processes/diagnoses(primary or secondary)?  Sepsis   Does the patient have Home health ordered?  No   Is there a DME ordered?  No   Comments regarding appointments  Office to call with appointment details.   Prep survey completed?  Yes          Radha Merino RN

## 2018-12-16 NOTE — ED PROVIDER NOTES
Subjective   37 yo WM, presented to ER w/ c/o of CP and SOB.  Pt admitted that he was recently discharged from [facility] with dx of pneumonia.  Pt stated that today he started to feel worse.  Pt denied being on any abx, however did admit to taking his breathing treatments.  Pt admits to productive cough w/ green/yellow sputum.  Pt is denying any NV or diarrhea.              Review of Systems   Constitutional: Positive for chills. Negative for fever.   HENT: Negative.    Respiratory: Positive for cough and wheezing.    Cardiovascular: Positive for chest pain.   Gastrointestinal: Negative.  Negative for abdominal pain.   Endocrine: Negative.    Genitourinary: Negative.  Negative for dysuria.   Skin: Negative.    Neurological: Negative.    Psychiatric/Behavioral: Negative.    All other systems reviewed and are negative.      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HCC)    • Fatty liver    • GERD (gastroesophageal reflux disease)    • Hyperlipidemia    • Hypertension    • Morbid obesity (CMS/HCC)    • Pulmonary embolism (CMS/HCC)    • RLS (restless legs syndrome)    • Seizures (CMS/HCC)     last one many years ago   • Sleep apnea     c-pap       Allergies   Allergen Reactions   • Glipizide Other (See Comments) and Unknown (See Comments)     Slurred Speech  Slurred Speech  Hallucinations, Slurred Speech   • Metoclopramide Anxiety   • Tramadol Other (See Comments)     seizures   • Risperidone Other (See Comments)     Slurred speech  Can't stand, trouble breathing, slurred speech         Past Surgical History:   Procedure Laterality Date   • ADENOIDECTOMY     • CHOLECYSTECTOMY      lap   • CORONARY ANGIOPLASTY WITH STENT PLACEMENT     • TONSILLECTOMY     • TRANSESOPHAGEAL ECHOCARDIOGRAM ([place])         Family History   Problem Relation Age of Onset   •  Heart disease Mother    • Hypertension Mother    • Hyperlipidemia Mother    • Coronary artery disease Mother    • Diabetes Mother    • Obesity Mother    • Kidney failure Mother    • Sleep apnea Father    • Cancer Paternal Grandfather        Social History     Socioeconomic History   • Marital status:      Spouse name: Cori   • Number of children: 0   • Years of education: Not on file   • Highest education level: Not on file   Occupational History   • Occupation: Disabled   Tobacco Use   • Smoking status: Former Smoker     Packs/day: 0.25     Years: 0.50     Pack years: 0.12     Types: Cigarettes     Last attempt to quit:      Years since quittin.9   • Smokeless tobacco: Current User     Types: Snuff   • Tobacco comment: quit smoking 25 years ago; he does use snuff   Substance and Sexual Activity   • Alcohol use: No   • Drug use: No   • Sexual activity: Defer           Objective   Physical Exam   Constitutional: He is oriented to person, place, and time. He appears well-developed and well-nourished. He appears ill. No distress.   Morbidly obese   HENT:   Head: Normocephalic and atraumatic.   Right Ear: External ear normal.   Left Ear: External ear normal.   Nose: Nose normal.   Eyes: Conjunctivae and EOM are normal. Pupils are equal, round, and reactive to light.   Neck: Normal range of motion. Neck supple. No JVD present. No tracheal deviation present.   Cardiovascular: Normal rate, regular rhythm and normal heart sounds.   No murmur heard.  Pulmonary/Chest: Effort normal. No respiratory distress. He has wheezes in the right upper field and the left upper field.   Abdominal: Soft. Bowel sounds are normal. There is no tenderness.   Musculoskeletal: Normal range of motion. He exhibits no edema or deformity.   Neurological: He is alert and oriented to person, place, and time. No cranial nerve deficit.   Skin: Skin is warm and dry. No rash noted. He is not diaphoretic. No erythema. No pallor.    Psychiatric: His behavior is normal. Thought content normal. His mood appears anxious.   Nursing note and vitals reviewed.      Procedures           ED Course  ED Course as of Dec 16 0657   Thu Dec 13, 2018   1857 EKG interpreted by Dr. Loya, normal sinus rhythm with nonspecific ST abnormality.  Heart rate of 80 bpm  [RB]   2010 CXR rad interpreted:  No Acute Disease    [RB]   2102 Discussed case with Hospital is [place] secondary to elevated lactic patient will be admission  [RB]      ED Course User Index  [RB] Jevon Hernandez PA                  MDM  Number of Diagnoses or Management Options  Sepsis, due to unspecified organism (CMS/HCC): established and worsening     Amount and/or Complexity of Data Reviewed  Clinical lab tests: ordered and reviewed  Tests in the radiology section of CPT®: ordered and reviewed  Decide to obtain previous medical records or to obtain history from someone other than the patient: yes  Discuss the patient with other providers: yes    Risk of Complications, Morbidity, and/or Mortality  Presenting problems: moderate  Diagnostic procedures: moderate  Management options: moderate    Patient Progress  Patient progress: stable        Final diagnoses:   Sepsis, due to unspecified organism (CMS/HCC)            Jevon Hernandez PA  12/16/18 0657

## 2018-12-17 ENCOUNTER — READMISSION MANAGEMENT (OUTPATIENT)
Dept: CALL CENTER | Facility: HOSPITAL | Age: 38
End: 2018-12-17

## 2018-12-18 ENCOUNTER — READMISSION MANAGEMENT (OUTPATIENT)
Dept: CALL CENTER | Facility: HOSPITAL | Age: 38
End: 2018-12-18

## 2018-12-18 LAB
BACTERIA SPEC AEROBE CULT: NORMAL
BACTERIA SPEC AEROBE CULT: NORMAL

## 2018-12-18 NOTE — OUTREACH NOTE
Sepsis Week 1 Survey      Responses   Facility patient discharged from?  [facility]   Does the patient have one of the following disease processes/diagnoses(primary or secondary)?  Sepsis   Is there a successful TCM telephone encounter documented?  No   Week 1 attempt successful?  No   Unsuccessful attempts  Attempt 2          Tere Zimmerman RN

## 2018-12-21 ENCOUNTER — RESULTS ENCOUNTER (OUTPATIENT)
Dept: BARIATRICS/WEIGHT MGMT | Facility: CLINIC | Age: 38
End: 2018-12-21

## 2018-12-21 DIAGNOSIS — K21.9 GASTROESOPHAGEAL REFLUX DISEASE, ESOPHAGITIS PRESENCE NOT SPECIFIED: ICD-10-CM

## 2018-12-21 DIAGNOSIS — E11.69 DIABETES MELLITUS TYPE 2 IN OBESE (HCC): ICD-10-CM

## 2018-12-21 DIAGNOSIS — E78.2 MIXED HYPERLIPIDEMIA: ICD-10-CM

## 2018-12-21 DIAGNOSIS — E66.9 DIABETES MELLITUS TYPE 2 IN OBESE (HCC): ICD-10-CM

## 2018-12-21 DIAGNOSIS — E66.01 MORBID OBESITY WITH BMI OF 70 AND OVER, ADULT (HCC): ICD-10-CM

## 2018-12-21 DIAGNOSIS — I10 UNCONTROLLED HYPERTENSION: ICD-10-CM

## 2018-12-24 ENCOUNTER — READMISSION MANAGEMENT (OUTPATIENT)
Dept: CALL CENTER | Facility: HOSPITAL | Age: 38
End: 2018-12-24

## 2018-12-24 NOTE — OUTREACH NOTE
Sepsis Week 2 Survey      Responses   Facility patient discharged from?  [facility]   Does the patient have one of the following disease processes/diagnoses(primary or secondary)?  Sepsis   Week 2 attempt successful?  No   Unsuccessful attempts  Attempt 1          Chapincito Snider RN

## 2018-12-27 ENCOUNTER — READMISSION MANAGEMENT (OUTPATIENT)
Dept: CALL CENTER | Facility: HOSPITAL | Age: 38
End: 2018-12-27

## 2018-12-27 NOTE — OUTREACH NOTE
Sepsis Week 2 Survey      Responses   Facility patient discharged from?  [place]   Does the patient have one of the following disease processes/diagnoses(primary or secondary)?  Sepsis   Week 2 attempt successful?  No   Unsuccessful attempts  Attempt 2          Iona Hernandez RN

## 2018-12-28 ENCOUNTER — HOSPITAL ENCOUNTER (OUTPATIENT)
Facility: HOSPITAL | Age: 38
Setting detail: OBSERVATION
Discharge: HOME | End: 2019-01-01
Attending: EMERGENCY MEDICINE | Admitting: INTERNAL MEDICINE

## 2018-12-28 ENCOUNTER — APPOINTMENT (OUTPATIENT)
Dept: GENERAL RADIOLOGY | Facility: HOSPITAL | Age: 38
End: 2018-12-28

## 2018-12-28 ENCOUNTER — APPOINTMENT (OUTPATIENT)
Dept: CT IMAGING | Facility: HOSPITAL | Age: 38
End: 2018-12-28

## 2018-12-28 DIAGNOSIS — A41.9 SEPSIS, DUE TO UNSPECIFIED ORGANISM: Primary | ICD-10-CM

## 2018-12-28 LAB
ALBUMIN SERPL-MCNC: 3.8 G/DL (ref 3.4–4.8)
ALBUMIN/GLOB SERPL: 1.2 G/DL (ref 1.1–1.8)
ALP SERPL-CCNC: 87 U/L (ref 38–126)
ALT SERPL W P-5'-P-CCNC: 25 U/L (ref 21–72)
ANION GAP SERPL CALCULATED.3IONS-SCNC: 9 MMOL/L (ref 5–15)
AST SERPL-CCNC: 38 U/L (ref 17–59)
BACTERIA UR QL AUTO: NORMAL /HPF
BASOPHILS # BLD AUTO: 0.01 10*3/MM3 (ref 0–0.2)
BASOPHILS NFR BLD AUTO: 0.2 % (ref 0–2)
BILIRUB SERPL-MCNC: 0.7 MG/DL (ref 0.2–1.3)
BILIRUB UR QL STRIP: NEGATIVE
BUN BLD-MCNC: 6 MG/DL (ref 7–21)
BUN/CREAT SERPL: 8.8 (ref 7–25)
CALCIUM SPEC-SCNC: 8.7 MG/DL (ref 8.4–10.2)
CHLORIDE SERPL-SCNC: 96 MMOL/L (ref 95–110)
CLARITY UR: CLEAR
CO2 SERPL-SCNC: 26 MMOL/L (ref 22–31)
COLOR UR: ABNORMAL
CREAT BLD-MCNC: 0.68 MG/DL (ref 0.7–1.3)
D-DIMER, QUANTITATIVE (MAD,POW, STR): 1406 NG/ML (FEU) (ref 0–470)
D-LACTATE SERPL-SCNC: 1.3 MMOL/L (ref 0.5–2)
D-LACTATE SERPL-SCNC: 2.8 MMOL/L (ref 0.5–2)
DEPRECATED RDW RBC AUTO: 41 FL (ref 35.1–43.9)
EOSINOPHIL # BLD AUTO: 0.18 10*3/MM3 (ref 0–0.7)
EOSINOPHIL NFR BLD AUTO: 3.2 % (ref 0–7)
ERYTHROCYTE [DISTWIDTH] IN BLOOD BY AUTOMATED COUNT: 14.3 % (ref 11.5–14.5)
FLUAV AG NPH QL: NEGATIVE
FLUBV AG NPH QL IA: NEGATIVE
GFR SERPL CREATININE-BSD FRML MDRD: 129 ML/MIN/1.73 (ref 63–147)
GLOBULIN UR ELPH-MCNC: 3.2 GM/DL (ref 2.3–3.5)
GLUCOSE BLD-MCNC: 261 MG/DL (ref 60–100)
GLUCOSE UR STRIP-MCNC: ABNORMAL MG/DL
HCT VFR BLD AUTO: 38.9 % (ref 39–49)
HGB BLD-MCNC: 13.6 G/DL (ref 13.7–17.3)
HGB UR QL STRIP.AUTO: NEGATIVE
HOLD SPECIMEN: NORMAL
HYALINE CASTS UR QL AUTO: NORMAL /LPF
IMM GRANULOCYTES # BLD AUTO: 0.03 10*3/MM3 (ref 0–0.02)
IMM GRANULOCYTES NFR BLD AUTO: 0.5 % (ref 0–0.5)
KETONES UR QL STRIP: ABNORMAL
LEUKOCYTE ESTERASE UR QL STRIP.AUTO: NEGATIVE
LYMPHOCYTES # BLD AUTO: 0.64 10*3/MM3 (ref 0.6–4.2)
LYMPHOCYTES NFR BLD AUTO: 11.3 % (ref 10–50)
MCH RBC QN AUTO: 27.8 PG (ref 26.5–34)
MCHC RBC AUTO-ENTMCNC: 35 G/DL (ref 31.5–36.3)
MCV RBC AUTO: 79.6 FL (ref 80–98)
MONOCYTES # BLD AUTO: 0.68 10*3/MM3 (ref 0–0.9)
MONOCYTES NFR BLD AUTO: 12 % (ref 0–12)
NEUTROPHILS # BLD AUTO: 4.14 10*3/MM3 (ref 2–8.6)
NEUTROPHILS NFR BLD AUTO: 72.8 % (ref 37–80)
NITRITE UR QL STRIP: NEGATIVE
NT-PROBNP SERPL-MCNC: 53.3 PG/ML (ref 0–450)
PH UR STRIP.AUTO: 5.5 [PH] (ref 5–9)
PLATELET # BLD AUTO: 108 10*3/MM3 (ref 150–450)
PMV BLD AUTO: 8.8 FL (ref 8–12)
POTASSIUM BLD-SCNC: 3.5 MMOL/L (ref 3.5–5.1)
PROT SERPL-MCNC: 7 G/DL (ref 6.3–8.6)
PROT UR QL STRIP: ABNORMAL
RBC # BLD AUTO: 4.89 10*6/MM3 (ref 4.37–5.74)
RBC # UR: NORMAL /HPF
REF LAB TEST METHOD: NORMAL
SODIUM BLD-SCNC: 131 MMOL/L (ref 137–145)
SP GR UR STRIP: 1.02 (ref 1–1.03)
SQUAMOUS #/AREA URNS HPF: NORMAL /HPF
UROBILINOGEN UR QL STRIP: ABNORMAL
WBC NRBC COR # BLD: 5.68 10*3/MM3 (ref 3.2–9.8)
WBC UR QL AUTO: NORMAL /HPF
WHOLE BLOOD HOLD SPECIMEN: NORMAL
WHOLE BLOOD HOLD SPECIMEN: NORMAL

## 2018-12-28 PROCEDURE — 94799 UNLISTED PULMONARY SVC/PX: CPT

## 2018-12-28 PROCEDURE — 71045 X-RAY EXAM CHEST 1 VIEW: CPT

## 2018-12-28 PROCEDURE — 36415 COLL VENOUS BLD VENIPUNCTURE: CPT | Performed by: EMERGENCY MEDICINE

## 2018-12-28 PROCEDURE — 83605 ASSAY OF LACTIC ACID: CPT | Performed by: EMERGENCY MEDICINE

## 2018-12-28 PROCEDURE — G0378 HOSPITAL OBSERVATION PER HR: HCPCS

## 2018-12-28 PROCEDURE — 94760 N-INVAS EAR/PLS OXIMETRY 1: CPT

## 2018-12-28 PROCEDURE — 81001 URINALYSIS AUTO W/SCOPE: CPT | Performed by: EMERGENCY MEDICINE

## 2018-12-28 PROCEDURE — 80053 COMPREHEN METABOLIC PANEL: CPT | Performed by: EMERGENCY MEDICINE

## 2018-12-28 PROCEDURE — 85379 FIBRIN DEGRADATION QUANT: CPT | Performed by: EMERGENCY MEDICINE

## 2018-12-28 PROCEDURE — 25010000002 CEFTRIAXONE: Performed by: EMERGENCY MEDICINE

## 2018-12-28 PROCEDURE — 93005 ELECTROCARDIOGRAM TRACING: CPT | Performed by: EMERGENCY MEDICINE

## 2018-12-28 PROCEDURE — 93005 ELECTROCARDIOGRAM TRACING: CPT

## 2018-12-28 PROCEDURE — 85025 COMPLETE CBC W/AUTO DIFF WBC: CPT | Performed by: EMERGENCY MEDICINE

## 2018-12-28 PROCEDURE — 71275 CT ANGIOGRAPHY CHEST: CPT

## 2018-12-28 PROCEDURE — 87804 INFLUENZA ASSAY W/OPTIC: CPT | Performed by: EMERGENCY MEDICINE

## 2018-12-28 PROCEDURE — 83880 ASSAY OF NATRIURETIC PEPTIDE: CPT | Performed by: EMERGENCY MEDICINE

## 2018-12-28 PROCEDURE — 0 IOPAMIDOL PER 1 ML: Performed by: EMERGENCY MEDICINE

## 2018-12-28 PROCEDURE — 94640 AIRWAY INHALATION TREATMENT: CPT

## 2018-12-28 PROCEDURE — 96365 THER/PROPH/DIAG IV INF INIT: CPT

## 2018-12-28 PROCEDURE — 93010 ELECTROCARDIOGRAM REPORT: CPT | Performed by: INTERNAL MEDICINE

## 2018-12-28 PROCEDURE — 87040 BLOOD CULTURE FOR BACTERIA: CPT | Performed by: EMERGENCY MEDICINE

## 2018-12-28 PROCEDURE — 99285 EMERGENCY DEPT VISIT HI MDM: CPT

## 2018-12-28 RX ORDER — ACETAMINOPHEN 500 MG
1000 TABLET ORAL ONCE
Status: COMPLETED | OUTPATIENT
Start: 2018-12-28 | End: 2018-12-28

## 2018-12-28 RX ORDER — IPRATROPIUM BROMIDE AND ALBUTEROL SULFATE 2.5; .5 MG/3ML; MG/3ML
3 SOLUTION RESPIRATORY (INHALATION) ONCE
Status: COMPLETED | OUTPATIENT
Start: 2018-12-28 | End: 2018-12-28

## 2018-12-28 RX ORDER — SODIUM CHLORIDE 0.9 % (FLUSH) 0.9 %
10 SYRINGE (ML) INJECTION AS NEEDED
Status: DISCONTINUED | OUTPATIENT
Start: 2018-12-28 | End: 2019-01-01 | Stop reason: HOSPADM

## 2018-12-28 RX ADMIN — IPRATROPIUM BROMIDE AND ALBUTEROL SULFATE 3 ML: 2.5; .5 SOLUTION RESPIRATORY (INHALATION) at 20:26

## 2018-12-28 RX ADMIN — ACETAMINOPHEN 1000 MG: 500 TABLET ORAL at 19:27

## 2018-12-28 RX ADMIN — CEFTRIAXONE 1 G: 1 INJECTION, POWDER, FOR SOLUTION INTRAMUSCULAR; INTRAVENOUS at 19:44

## 2018-12-28 RX ADMIN — SODIUM CHLORIDE 1000 ML: 900 INJECTION, SOLUTION INTRAVENOUS at 20:32

## 2018-12-28 RX ADMIN — IPRATROPIUM BROMIDE AND ALBUTEROL SULFATE 3 ML: 2.5; .5 SOLUTION RESPIRATORY (INHALATION) at 19:21

## 2018-12-28 RX ADMIN — IOPAMIDOL 86 ML: 755 INJECTION, SOLUTION INTRAVENOUS at 21:19

## 2018-12-29 ENCOUNTER — APPOINTMENT (OUTPATIENT)
Dept: CARDIOLOGY | Facility: HOSPITAL | Age: 38
End: 2018-12-29
Attending: FAMILY MEDICINE

## 2018-12-29 ENCOUNTER — READMISSION MANAGEMENT (OUTPATIENT)
Dept: CALL CENTER | Facility: HOSPITAL | Age: 38
End: 2018-12-29

## 2018-12-29 ENCOUNTER — APPOINTMENT (OUTPATIENT)
Dept: ULTRASOUND IMAGING | Facility: HOSPITAL | Age: 38
End: 2018-12-29

## 2018-12-29 LAB
ADV 40+41 DNA STL QL NAA+NON-PROBE: NOT DETECTED
ANION GAP SERPL CALCULATED.3IONS-SCNC: 10 MMOL/L (ref 5–15)
ANISOCYTOSIS BLD QL: ABNORMAL
ARTERIAL PATENCY WRIST A: POSITIVE
ASTRO TYP 1-8 RNA STL QL NAA+NON-PROBE: NOT DETECTED
ATMOSPHERIC PRESS: 754 MMHG
BASE EXCESS BLDA CALC-SCNC: 1.4 MMOL/L (ref 0–2)
BDY SITE: ABNORMAL
BH CV ECHO MEAS - ACS: 2.3 CM
BH CV ECHO MEAS - AO MAX PG (FULL): 0.77 MMHG
BH CV ECHO MEAS - AO MAX PG: 10.6 MMHG
BH CV ECHO MEAS - AO MEAN PG (FULL): 0 MMHG
BH CV ECHO MEAS - AO MEAN PG: 6 MMHG
BH CV ECHO MEAS - AO ROOT AREA (BSA CORRECTED): 1.1
BH CV ECHO MEAS - AO ROOT AREA: 9.1 CM^2
BH CV ECHO MEAS - AO ROOT DIAM: 3.4 CM
BH CV ECHO MEAS - AO V2 MAX: 163 CM/SEC
BH CV ECHO MEAS - AO V2 MEAN: 116 CM/SEC
BH CV ECHO MEAS - AO V2 VTI: 37.9 CM
BH CV ECHO MEAS - AVA(I,A): 3.7 CM^2
BH CV ECHO MEAS - AVA(I,D): 3.7 CM^2
BH CV ECHO MEAS - AVA(V,A): 4 CM^2
BH CV ECHO MEAS - AVA(V,D): 4 CM^2
BH CV ECHO MEAS - BSA(HAYCOCK): 3.4 M^2
BH CV ECHO MEAS - BSA: 3 M^2
BH CV ECHO MEAS - BZI_BMI: 64.2 KILOGRAMS/M^2
BH CV ECHO MEAS - BZI_METRIC_HEIGHT: 180.3 CM
BH CV ECHO MEAS - BZI_METRIC_WEIGHT: 208.7 KG
BH CV ECHO MEAS - EDV(CUBED): 114.1 ML
BH CV ECHO MEAS - EDV(TEICH): 110.2 ML
BH CV ECHO MEAS - EF(CUBED): 77.7 %
BH CV ECHO MEAS - EF(TEICH): 69.8 %
BH CV ECHO MEAS - ESV(CUBED): 25.4 ML
BH CV ECHO MEAS - ESV(TEICH): 33.3 ML
BH CV ECHO MEAS - FS: 39.4 %
BH CV ECHO MEAS - IVS/LVPW: 1
BH CV ECHO MEAS - IVSD: 1.4 CM
BH CV ECHO MEAS - LA DIMENSION: 3.8 CM
BH CV ECHO MEAS - LA/AO: 1.1
BH CV ECHO MEAS - LV MASS(C)D: 259.5 GRAMS
BH CV ECHO MEAS - LV MASS(C)DI: 86.4 GRAMS/M^2
BH CV ECHO MEAS - LV MAX PG: 9.9 MMHG
BH CV ECHO MEAS - LV MEAN PG: 6 MMHG
BH CV ECHO MEAS - LV V1 MAX: 157 CM/SEC
BH CV ECHO MEAS - LV V1 MEAN: 110 CM/SEC
BH CV ECHO MEAS - LV V1 VTI: 33.6 CM
BH CV ECHO MEAS - LVIDD: 4.9 CM
BH CV ECHO MEAS - LVIDS: 2.9 CM
BH CV ECHO MEAS - LVOT AREA (M): 4.2 CM^2
BH CV ECHO MEAS - LVOT AREA: 4.2 CM^2
BH CV ECHO MEAS - LVOT DIAM: 2.3 CM
BH CV ECHO MEAS - LVPWD: 1.3 CM
BH CV ECHO MEAS - MR MAX PG: 30 MMHG
BH CV ECHO MEAS - MR MAX VEL: 274 CM/SEC
BH CV ECHO MEAS - MV A MAX VEL: 102 CM/SEC
BH CV ECHO MEAS - MV DEC SLOPE: 371 CM/SEC^2
BH CV ECHO MEAS - MV E MAX VEL: 123 CM/SEC
BH CV ECHO MEAS - MV E/A: 1.2
BH CV ECHO MEAS - MV P1/2T MAX VEL: 122 CM/SEC
BH CV ECHO MEAS - MV P1/2T: 96.3 MSEC
BH CV ECHO MEAS - MVA P1/2T LCG: 1.8 CM^2
BH CV ECHO MEAS - MVA(P1/2T): 2.3 CM^2
BH CV ECHO MEAS - PA MAX PG: 7.8 MMHG
BH CV ECHO MEAS - PA V2 MAX: 140 CM/SEC
BH CV ECHO MEAS - RAP SYSTOLE: 5 MMHG
BH CV ECHO MEAS - RVSP: 18 MMHG
BH CV ECHO MEAS - SI(AO): 114.5 ML/M^2
BH CV ECHO MEAS - SI(CUBED): 29.5 ML/M^2
BH CV ECHO MEAS - SI(LVOT): 46.5 ML/M^2
BH CV ECHO MEAS - SI(TEICH): 25.6 ML/M^2
BH CV ECHO MEAS - SV(AO): 344.1 ML
BH CV ECHO MEAS - SV(CUBED): 88.7 ML
BH CV ECHO MEAS - SV(LVOT): 139.6 ML
BH CV ECHO MEAS - SV(TEICH): 76.8 ML
BUN BLD-MCNC: 6 MG/DL (ref 7–21)
BUN/CREAT SERPL: 9.2 (ref 7–25)
C CAYETANENSIS DNA STL QL NAA+NON-PROBE: NOT DETECTED
C DIFF TOX GENS STL QL NAA+PROBE: NOT DETECTED
CALCIUM SPEC-SCNC: 8.2 MG/DL (ref 8.4–10.2)
CAMPY SP DNA.DIARRHEA STL QL NAA+PROBE: NOT DETECTED
CHLORIDE SERPL-SCNC: 100 MMOL/L (ref 95–110)
CO2 SERPL-SCNC: 22 MMOL/L (ref 22–31)
CREAT BLD-MCNC: 0.65 MG/DL (ref 0.7–1.3)
CRYPTOSP STL CULT: NOT DETECTED
DEPRECATED RDW RBC AUTO: 42.7 FL (ref 35.1–43.9)
E COLI DNA SPEC QL NAA+PROBE: NOT DETECTED
E HISTOLYT AG STL-ACNC: NOT DETECTED
EAEC PAA PLAS AGGR+AATA ST NAA+NON-PRB: NOT DETECTED
EC STX1 + STX2 GENES STL NAA+PROBE: NOT DETECTED
EPEC EAE GENE STL QL NAA+NON-PROBE: NOT DETECTED
ERYTHROCYTE [DISTWIDTH] IN BLOOD BY AUTOMATED COUNT: 14.6 % (ref 11.5–14.5)
ETEC LTA+ST1A+ST1B TOX ST NAA+NON-PROBE: NOT DETECTED
G LAMBLIA DNA SPEC QL NAA+PROBE: NOT DETECTED
GAS FLOW AIRWAY: 4.5 LPM
GFR SERPL CREATININE-BSD FRML MDRD: 136 ML/MIN/1.73 (ref 63–147)
GLUCOSE BLD-MCNC: 250 MG/DL (ref 60–100)
GLUCOSE BLDC GLUCOMTR-MCNC: 191 MG/DL (ref 70–130)
GLUCOSE BLDC GLUCOMTR-MCNC: 334 MG/DL (ref 70–130)
GLUCOSE BLDC GLUCOMTR-MCNC: 351 MG/DL (ref 70–130)
GLUCOSE BLDC GLUCOMTR-MCNC: 391 MG/DL (ref 70–130)
GLUCOSE BLDC GLUCOMTR-MCNC: 395 MG/DL (ref 70–130)
HCO3 BLDA-SCNC: 24.8 MMOL/L (ref 20–26)
HCT VFR BLD AUTO: 37.9 % (ref 39–49)
HGB BLD-MCNC: 13 G/DL (ref 13.7–17.3)
LYMPHOCYTES # BLD MANUAL: 1.45 10*3/MM3 (ref 0.6–4.2)
LYMPHOCYTES NFR BLD MANUAL: 1 % (ref 0–12)
LYMPHOCYTES NFR BLD MANUAL: 20 % (ref 10–50)
Lab: ABNORMAL
MAGNESIUM SERPL-MCNC: 2 MG/DL (ref 1.6–2.3)
MCH RBC QN AUTO: 27.5 PG (ref 26.5–34)
MCHC RBC AUTO-ENTMCNC: 34.3 G/DL (ref 31.5–36.3)
MCV RBC AUTO: 80.3 FL (ref 80–98)
MODALITY: ABNORMAL
MONOCYTES # BLD AUTO: 0.07 10*3/MM3 (ref 0–0.9)
NEUTROPHILS # BLD AUTO: 5.74 10*3/MM3 (ref 2–8.6)
NEUTROPHILS NFR BLD MANUAL: 70 % (ref 37–80)
NEUTS BAND NFR BLD MANUAL: 9 % (ref 0–5)
NOROVIRUS GI+II RNA STL QL NAA+NON-PROBE: NOT DETECTED
P SHIGELLOIDES DNA STL QL NAA+NON-PROBE: NOT DETECTED
PCO2 BLDA: 34.4 MM HG (ref 35–45)
PH BLDA: 7.46 PH UNITS (ref 7.35–7.45)
PLATELET # BLD AUTO: 115 10*3/MM3 (ref 150–450)
PMV BLD AUTO: 9.2 FL (ref 8–12)
PO2 BLDA: 114 MM HG (ref 83–108)
POTASSIUM BLD-SCNC: 3.7 MMOL/L (ref 3.5–5.1)
RBC # BLD AUTO: 4.72 10*6/MM3 (ref 4.37–5.74)
RV RNA STL NAA+PROBE: NOT DETECTED
SALMONELLA DNA SPEC QL NAA+PROBE: NOT DETECTED
SAO2 % BLDCOA: 99 % (ref 94–99)
SAPO I+II+IV+V RNA STL QL NAA+NON-PROBE: NOT DETECTED
SHIGELLA SP+EIEC IPAH STL QL NAA+PROBE: NOT DETECTED
SMALL PLATELETS BLD QL SMEAR: ABNORMAL
SODIUM BLD-SCNC: 132 MMOL/L (ref 137–145)
TROPONIN I SERPL-MCNC: <0.012 NG/ML
V CHOLERAE DNA SPEC QL NAA+PROBE: NOT DETECTED
VENTILATOR MODE: ABNORMAL
VIBRIO DNA SPEC NAA+PROBE: NOT DETECTED
WBC MORPH BLD: NORMAL
WBC NRBC COR # BLD: 7.27 10*3/MM3 (ref 3.2–9.8)
YERSINIA STL CULT: NOT DETECTED

## 2018-12-29 PROCEDURE — 93970 EXTREMITY STUDY: CPT

## 2018-12-29 PROCEDURE — 94799 UNLISTED PULMONARY SVC/PX: CPT

## 2018-12-29 PROCEDURE — 94660 CPAP INITIATION&MGMT: CPT

## 2018-12-29 PROCEDURE — 94760 N-INVAS EAR/PLS OXIMETRY 1: CPT

## 2018-12-29 PROCEDURE — 96376 TX/PRO/DX INJ SAME DRUG ADON: CPT

## 2018-12-29 PROCEDURE — 96367 TX/PROPH/DG ADDL SEQ IV INF: CPT

## 2018-12-29 PROCEDURE — 82962 GLUCOSE BLOOD TEST: CPT

## 2018-12-29 PROCEDURE — 93306 TTE W/DOPPLER COMPLETE: CPT

## 2018-12-29 PROCEDURE — 84484 ASSAY OF TROPONIN QUANT: CPT | Performed by: FAMILY MEDICINE

## 2018-12-29 PROCEDURE — 96366 THER/PROPH/DIAG IV INF ADDON: CPT

## 2018-12-29 PROCEDURE — 94640 AIRWAY INHALATION TREATMENT: CPT

## 2018-12-29 PROCEDURE — 96375 TX/PRO/DX INJ NEW DRUG ADDON: CPT

## 2018-12-29 PROCEDURE — 85027 COMPLETE CBC AUTOMATED: CPT | Performed by: FAMILY MEDICINE

## 2018-12-29 PROCEDURE — 82803 BLOOD GASES ANY COMBINATION: CPT

## 2018-12-29 PROCEDURE — G0378 HOSPITAL OBSERVATION PER HR: HCPCS

## 2018-12-29 PROCEDURE — 25010000002 CEFEPIME PER 500 MG: Performed by: FAMILY MEDICINE

## 2018-12-29 PROCEDURE — 80048 BASIC METABOLIC PNL TOTAL CA: CPT | Performed by: FAMILY MEDICINE

## 2018-12-29 PROCEDURE — 25010000002 VANCOMYCIN 1 G RECONSTITUTED SOLUTION: Performed by: FAMILY MEDICINE

## 2018-12-29 PROCEDURE — 85007 BL SMEAR W/DIFF WBC COUNT: CPT | Performed by: FAMILY MEDICINE

## 2018-12-29 PROCEDURE — 36600 WITHDRAWAL OF ARTERIAL BLOOD: CPT

## 2018-12-29 PROCEDURE — 83735 ASSAY OF MAGNESIUM: CPT | Performed by: FAMILY MEDICINE

## 2018-12-29 PROCEDURE — 63710000001 INSULIN ASPART PER 5 UNITS: Performed by: FAMILY MEDICINE

## 2018-12-29 PROCEDURE — 87999 UNLISTED MICROBIOLOGY PX: CPT | Performed by: FAMILY MEDICINE

## 2018-12-29 PROCEDURE — 93306 TTE W/DOPPLER COMPLETE: CPT | Performed by: INTERNAL MEDICINE

## 2018-12-29 PROCEDURE — 25010000002 VANCOMYCIN 5 G RECONSTITUTED SOLUTION: Performed by: FAMILY MEDICINE

## 2018-12-29 PROCEDURE — 25010000002 METHYLPREDNISOLONE PER 40 MG: Performed by: FAMILY MEDICINE

## 2018-12-29 RX ORDER — FLUTICASONE PROPIONATE 50 MCG
2 SPRAY, SUSPENSION (ML) NASAL DAILY
Status: DISCONTINUED | OUTPATIENT
Start: 2018-12-29 | End: 2019-01-01 | Stop reason: HOSPADM

## 2018-12-29 RX ORDER — IPRATROPIUM BROMIDE AND ALBUTEROL SULFATE 2.5; .5 MG/3ML; MG/3ML
3 SOLUTION RESPIRATORY (INHALATION)
Status: DISCONTINUED | OUTPATIENT
Start: 2018-12-29 | End: 2019-01-01 | Stop reason: HOSPADM

## 2018-12-29 RX ORDER — SODIUM CHLORIDE 0.9 % (FLUSH) 0.9 %
3-10 SYRINGE (ML) INJECTION AS NEEDED
Status: DISCONTINUED | OUTPATIENT
Start: 2018-12-29 | End: 2019-01-01 | Stop reason: HOSPADM

## 2018-12-29 RX ORDER — METHYLPREDNISOLONE SODIUM SUCCINATE 40 MG/ML
40 INJECTION, POWDER, LYOPHILIZED, FOR SOLUTION INTRAMUSCULAR; INTRAVENOUS EVERY 6 HOURS
Status: DISCONTINUED | OUTPATIENT
Start: 2018-12-29 | End: 2018-12-30

## 2018-12-29 RX ORDER — NICOTINE POLACRILEX 4 MG
15 LOZENGE BUCCAL
Status: DISCONTINUED | OUTPATIENT
Start: 2018-12-29 | End: 2019-01-01 | Stop reason: HOSPADM

## 2018-12-29 RX ORDER — FAMOTIDINE 40 MG/1
40 TABLET, FILM COATED ORAL DAILY
Status: DISCONTINUED | OUTPATIENT
Start: 2018-12-29 | End: 2019-01-01 | Stop reason: HOSPADM

## 2018-12-29 RX ORDER — BUDESONIDE AND FORMOTEROL FUMARATE DIHYDRATE 160; 4.5 UG/1; UG/1
2 AEROSOL RESPIRATORY (INHALATION)
Status: DISCONTINUED | OUTPATIENT
Start: 2018-12-29 | End: 2019-01-01 | Stop reason: HOSPADM

## 2018-12-29 RX ORDER — TRAZODONE HYDROCHLORIDE 150 MG/1
300 TABLET ORAL NIGHTLY PRN
Status: DISCONTINUED | OUTPATIENT
Start: 2018-12-29 | End: 2019-01-01 | Stop reason: HOSPADM

## 2018-12-29 RX ORDER — DEXTROSE MONOHYDRATE 25 G/50ML
25 INJECTION, SOLUTION INTRAVENOUS
Status: DISCONTINUED | OUTPATIENT
Start: 2018-12-29 | End: 2019-01-01 | Stop reason: HOSPADM

## 2018-12-29 RX ORDER — LISINOPRIL 40 MG/1
40 TABLET ORAL NIGHTLY
Status: DISCONTINUED | OUTPATIENT
Start: 2018-12-29 | End: 2019-01-01 | Stop reason: HOSPADM

## 2018-12-29 RX ORDER — RANOLAZINE 500 MG/1
1000 TABLET, EXTENDED RELEASE ORAL EVERY 12 HOURS SCHEDULED
Status: DISCONTINUED | OUTPATIENT
Start: 2018-12-29 | End: 2019-01-01 | Stop reason: HOSPADM

## 2018-12-29 RX ORDER — SODIUM CHLORIDE 0.9 % (FLUSH) 0.9 %
3 SYRINGE (ML) INJECTION EVERY 12 HOURS SCHEDULED
Status: DISCONTINUED | OUTPATIENT
Start: 2018-12-29 | End: 2019-01-01 | Stop reason: HOSPADM

## 2018-12-29 RX ORDER — ISOSORBIDE MONONITRATE 60 MG/1
120 TABLET, EXTENDED RELEASE ORAL NIGHTLY
Status: DISCONTINUED | OUTPATIENT
Start: 2018-12-29 | End: 2019-01-01 | Stop reason: HOSPADM

## 2018-12-29 RX ORDER — NITROGLYCERIN 0.4 MG/1
0.4 TABLET SUBLINGUAL
Status: DISCONTINUED | OUTPATIENT
Start: 2018-12-29 | End: 2019-01-01 | Stop reason: HOSPADM

## 2018-12-29 RX ORDER — AMLODIPINE BESYLATE 10 MG/1
10 TABLET ORAL NIGHTLY
Status: DISCONTINUED | OUTPATIENT
Start: 2018-12-29 | End: 2019-01-01 | Stop reason: HOSPADM

## 2018-12-29 RX ORDER — PRAMIPEXOLE DIHYDROCHLORIDE 1 MG/1
1 TABLET ORAL ONCE
Status: COMPLETED | OUTPATIENT
Start: 2018-12-29 | End: 2018-12-29

## 2018-12-29 RX ORDER — NEBIVOLOL 5 MG/1
5 TABLET ORAL 2 TIMES DAILY
Status: DISCONTINUED | OUTPATIENT
Start: 2018-12-29 | End: 2019-01-01 | Stop reason: HOSPADM

## 2018-12-29 RX ORDER — PRAMIPEXOLE DIHYDROCHLORIDE 1 MG/1
1 TABLET ORAL NIGHTLY
Status: DISCONTINUED | OUTPATIENT
Start: 2018-12-29 | End: 2019-01-01 | Stop reason: HOSPADM

## 2018-12-29 RX ORDER — SERTRALINE HYDROCHLORIDE 25 MG/1
25 TABLET, FILM COATED ORAL NIGHTLY
Status: DISCONTINUED | OUTPATIENT
Start: 2018-12-29 | End: 2019-01-01 | Stop reason: HOSPADM

## 2018-12-29 RX ORDER — PRASUGREL 10 MG/1
10 TABLET, FILM COATED ORAL DAILY
Status: DISCONTINUED | OUTPATIENT
Start: 2018-12-29 | End: 2019-01-01 | Stop reason: HOSPADM

## 2018-12-29 RX ADMIN — INSULIN ASPART 16 UNITS: 100 INJECTION, SOLUTION INTRAVENOUS; SUBCUTANEOUS at 11:30

## 2018-12-29 RX ADMIN — PRAMIPEXOLE DIHYDROCHLORIDE 1 MG: 1 TABLET ORAL at 21:17

## 2018-12-29 RX ADMIN — CEFEPIME HYDROCHLORIDE 2 G: 2 INJECTION, POWDER, FOR SOLUTION INTRAVENOUS at 18:17

## 2018-12-29 RX ADMIN — SODIUM CHLORIDE, PRESERVATIVE FREE 3 ML: 5 INJECTION INTRAVENOUS at 01:44

## 2018-12-29 RX ADMIN — VANCOMYCIN HYDROCHLORIDE 2000 MG: 1 INJECTION, POWDER, LYOPHILIZED, FOR SOLUTION INTRAVENOUS at 03:07

## 2018-12-29 RX ADMIN — FAMOTIDINE 40 MG: 40 TABLET ORAL at 08:13

## 2018-12-29 RX ADMIN — ISOSORBIDE MONONITRATE 120 MG: 60 TABLET, EXTENDED RELEASE ORAL at 21:16

## 2018-12-29 RX ADMIN — SODIUM CHLORIDE, PRESERVATIVE FREE 10 ML: 5 INJECTION INTRAVENOUS at 03:08

## 2018-12-29 RX ADMIN — RANOLAZINE 1000 MG: 500 TABLET, FILM COATED, EXTENDED RELEASE ORAL at 10:25

## 2018-12-29 RX ADMIN — CEFEPIME HYDROCHLORIDE 2 G: 2 INJECTION, POWDER, FOR SOLUTION INTRAVENOUS at 01:45

## 2018-12-29 RX ADMIN — TRAZODONE HYDROCHLORIDE 300 MG: 150 TABLET ORAL at 23:38

## 2018-12-29 RX ADMIN — METHYLPREDNISOLONE SODIUM SUCCINATE 40 MG: 40 INJECTION, POWDER, FOR SOLUTION INTRAMUSCULAR; INTRAVENOUS at 08:14

## 2018-12-29 RX ADMIN — INSULIN ASPART 20 UNITS: 100 INJECTION, SOLUTION INTRAVENOUS; SUBCUTANEOUS at 08:15

## 2018-12-29 RX ADMIN — SODIUM CHLORIDE, PRESERVATIVE FREE 3 ML: 5 INJECTION INTRAVENOUS at 08:14

## 2018-12-29 RX ADMIN — PRASUGREL HYDROCHLORIDE 10 MG: 10 TABLET, FILM COATED ORAL at 10:25

## 2018-12-29 RX ADMIN — AMLODIPINE BESYLATE 10 MG: 10 TABLET ORAL at 21:17

## 2018-12-29 RX ADMIN — INSULIN ASPART 20 UNITS: 100 INJECTION, SOLUTION INTRAVENOUS; SUBCUTANEOUS at 21:21

## 2018-12-29 RX ADMIN — BUDESONIDE AND FORMOTEROL FUMARATE DIHYDRATE 2 PUFF: 160; 4.5 AEROSOL RESPIRATORY (INHALATION) at 08:56

## 2018-12-29 RX ADMIN — INSULIN ASPART 4 UNITS: 100 INJECTION, SOLUTION INTRAVENOUS; SUBCUTANEOUS at 01:50

## 2018-12-29 RX ADMIN — VANCOMYCIN HYDROCHLORIDE 2500 MG: 5 INJECTION, POWDER, LYOPHILIZED, FOR SOLUTION INTRAVENOUS at 15:34

## 2018-12-29 RX ADMIN — METHYLPREDNISOLONE SODIUM SUCCINATE 40 MG: 40 INJECTION, POWDER, FOR SOLUTION INTRAMUSCULAR; INTRAVENOUS at 15:34

## 2018-12-29 RX ADMIN — IPRATROPIUM BROMIDE AND ALBUTEROL SULFATE 3 ML: 2.5; .5 SOLUTION RESPIRATORY (INHALATION) at 08:50

## 2018-12-29 RX ADMIN — PRAMIPEXOLE DIHYDROCHLORIDE 1 MG: 1 TABLET ORAL at 15:33

## 2018-12-29 RX ADMIN — LISINOPRIL 40 MG: 40 TABLET ORAL at 21:17

## 2018-12-29 RX ADMIN — IPRATROPIUM BROMIDE AND ALBUTEROL SULFATE 3 ML: 2.5; .5 SOLUTION RESPIRATORY (INHALATION) at 19:39

## 2018-12-29 RX ADMIN — RIVAROXABAN 20 MG: 10 TABLET, FILM COATED ORAL at 18:17

## 2018-12-29 RX ADMIN — IPRATROPIUM BROMIDE AND ALBUTEROL SULFATE 3 ML: 2.5; .5 SOLUTION RESPIRATORY (INHALATION) at 11:53

## 2018-12-29 RX ADMIN — METHYLPREDNISOLONE SODIUM SUCCINATE 40 MG: 40 INJECTION, POWDER, FOR SOLUTION INTRAMUSCULAR; INTRAVENOUS at 21:56

## 2018-12-29 RX ADMIN — INSULIN ASPART 20 UNITS: 100 INJECTION, SOLUTION INTRAVENOUS; SUBCUTANEOUS at 18:17

## 2018-12-29 RX ADMIN — IPRATROPIUM BROMIDE AND ALBUTEROL SULFATE 3 ML: 2.5; .5 SOLUTION RESPIRATORY (INHALATION) at 16:36

## 2018-12-29 RX ADMIN — NEBIVOLOL HYDROCHLORIDE 5 MG: 5 TABLET ORAL at 10:25

## 2018-12-29 RX ADMIN — FLUTICASONE PROPIONATE 2 SPRAY: 50 SPRAY, METERED NASAL at 10:25

## 2018-12-29 RX ADMIN — CEFEPIME HYDROCHLORIDE 2 G: 2 INJECTION, POWDER, FOR SOLUTION INTRAVENOUS at 10:24

## 2018-12-29 RX ADMIN — SERTRALINE HYDROCHLORIDE 25 MG: 25 TABLET ORAL at 21:17

## 2018-12-29 RX ADMIN — SODIUM CHLORIDE, PRESERVATIVE FREE 3 ML: 5 INJECTION INTRAVENOUS at 21:57

## 2018-12-29 RX ADMIN — METHYLPREDNISOLONE SODIUM SUCCINATE 40 MG: 40 INJECTION, POWDER, FOR SOLUTION INTRAMUSCULAR; INTRAVENOUS at 01:44

## 2018-12-29 RX ADMIN — RANOLAZINE 1000 MG: 500 TABLET, FILM COATED, EXTENDED RELEASE ORAL at 21:16

## 2018-12-29 RX ADMIN — NEBIVOLOL HYDROCHLORIDE 5 MG: 5 TABLET ORAL at 21:17

## 2018-12-29 RX ADMIN — BUDESONIDE AND FORMOTEROL FUMARATE DIHYDRATE 2 PUFF: 160; 4.5 AEROSOL RESPIRATORY (INHALATION) at 19:39

## 2018-12-29 RX ADMIN — BUDESONIDE AND FORMOTEROL FUMARATE DIHYDRATE 2 PUFF: 160; 4.5 AEROSOL RESPIRATORY (INHALATION) at 01:37

## 2018-12-29 NOTE — OUTREACH NOTE
Sepsis Week 2 Survey      Responses   Facility patient discharged from?  [place]   Does the patient have one of the following disease processes/diagnoses(primary or secondary)?  Sepsis   Week 2 attempt successful?  No   Revoke  Readmitted          Bety Akhtar RN

## 2018-12-30 LAB
ALBUMIN SERPL-MCNC: 3.4 G/DL (ref 3.4–4.8)
ALBUMIN/GLOB SERPL: 1.2 G/DL (ref 1.1–1.8)
ALP SERPL-CCNC: 70 U/L (ref 38–126)
ALT SERPL W P-5'-P-CCNC: 29 U/L (ref 21–72)
ANION GAP SERPL CALCULATED.3IONS-SCNC: 10 MMOL/L (ref 5–15)
AST SERPL-CCNC: 20 U/L (ref 17–59)
BASOPHILS # BLD AUTO: 0.01 10*3/MM3 (ref 0–0.2)
BASOPHILS NFR BLD AUTO: 0.1 % (ref 0–2)
BILIRUB SERPL-MCNC: 0.5 MG/DL (ref 0.2–1.3)
BUN BLD-MCNC: 16 MG/DL (ref 7–21)
BUN/CREAT SERPL: 22.2 (ref 7–25)
CALCIUM SPEC-SCNC: 8.6 MG/DL (ref 8.4–10.2)
CHLORIDE SERPL-SCNC: 98 MMOL/L (ref 95–110)
CO2 SERPL-SCNC: 24 MMOL/L (ref 22–31)
CREAT BLD-MCNC: 0.72 MG/DL (ref 0.7–1.3)
DEPRECATED RDW RBC AUTO: 42.2 FL (ref 35.1–43.9)
EOSINOPHIL # BLD AUTO: 0.04 10*3/MM3 (ref 0–0.7)
EOSINOPHIL NFR BLD AUTO: 0.5 % (ref 0–7)
ERYTHROCYTE [DISTWIDTH] IN BLOOD BY AUTOMATED COUNT: 14.6 % (ref 11.5–14.5)
GFR SERPL CREATININE-BSD FRML MDRD: 121 ML/MIN/1.73 (ref 63–147)
GLOBULIN UR ELPH-MCNC: 2.9 GM/DL (ref 2.3–3.5)
GLUCOSE BLD-MCNC: 417 MG/DL (ref 60–100)
GLUCOSE BLDC GLUCOMTR-MCNC: 372 MG/DL (ref 70–130)
GLUCOSE BLDC GLUCOMTR-MCNC: 400 MG/DL (ref 70–130)
GLUCOSE BLDC GLUCOMTR-MCNC: 485 MG/DL (ref 70–130)
HCT VFR BLD AUTO: 34.6 % (ref 39–49)
HGB BLD-MCNC: 11.9 G/DL (ref 13.7–17.3)
IMM GRANULOCYTES # BLD AUTO: 0.06 10*3/MM3 (ref 0–0.02)
IMM GRANULOCYTES NFR BLD AUTO: 0.8 % (ref 0–0.5)
LYMPHOCYTES # BLD AUTO: 0.65 10*3/MM3 (ref 0.6–4.2)
LYMPHOCYTES NFR BLD AUTO: 8.4 % (ref 10–50)
MCH RBC QN AUTO: 27.2 PG (ref 26.5–34)
MCHC RBC AUTO-ENTMCNC: 34.4 G/DL (ref 31.5–36.3)
MCV RBC AUTO: 79.2 FL (ref 80–98)
MONOCYTES # BLD AUTO: 0.51 10*3/MM3 (ref 0–0.9)
MONOCYTES NFR BLD AUTO: 6.6 % (ref 0–12)
NEUTROPHILS # BLD AUTO: 6.45 10*3/MM3 (ref 2–8.6)
NEUTROPHILS NFR BLD AUTO: 83.6 % (ref 37–80)
PLATELET # BLD AUTO: 143 10*3/MM3 (ref 150–450)
PMV BLD AUTO: 9.2 FL (ref 8–12)
POTASSIUM BLD-SCNC: 4.6 MMOL/L (ref 3.5–5.1)
PROT SERPL-MCNC: 6.3 G/DL (ref 6.3–8.6)
RBC # BLD AUTO: 4.37 10*6/MM3 (ref 4.37–5.74)
SODIUM BLD-SCNC: 132 MMOL/L (ref 137–145)
VANCOMYCIN PEAK SERPL-MCNC: 24.18 MCG/ML (ref 30–40)
VANCOMYCIN TROUGH SERPL-MCNC: 9.76 MCG/ML (ref 10–15)
WBC NRBC COR # BLD: 7.72 10*3/MM3 (ref 3.2–9.8)

## 2018-12-30 PROCEDURE — 80202 ASSAY OF VANCOMYCIN: CPT | Performed by: FAMILY MEDICINE

## 2018-12-30 PROCEDURE — 82962 GLUCOSE BLOOD TEST: CPT

## 2018-12-30 PROCEDURE — 63710000001 INSULIN DETEMIR PER 5 UNITS: Performed by: FAMILY MEDICINE

## 2018-12-30 PROCEDURE — 94799 UNLISTED PULMONARY SVC/PX: CPT

## 2018-12-30 PROCEDURE — 63710000001 INSULIN ASPART PER 5 UNITS: Performed by: FAMILY MEDICINE

## 2018-12-30 PROCEDURE — 25010000002 METHYLPREDNISOLONE PER 40 MG: Performed by: FAMILY MEDICINE

## 2018-12-30 PROCEDURE — 80053 COMPREHEN METABOLIC PANEL: CPT | Performed by: FAMILY MEDICINE

## 2018-12-30 PROCEDURE — 85025 COMPLETE CBC W/AUTO DIFF WBC: CPT | Performed by: FAMILY MEDICINE

## 2018-12-30 PROCEDURE — 25010000002 VANCOMYCIN 5 G RECONSTITUTED SOLUTION: Performed by: FAMILY MEDICINE

## 2018-12-30 PROCEDURE — 25010000002 CEFEPIME PER 500 MG: Performed by: FAMILY MEDICINE

## 2018-12-30 PROCEDURE — 94660 CPAP INITIATION&MGMT: CPT

## 2018-12-30 PROCEDURE — 96366 THER/PROPH/DIAG IV INF ADDON: CPT

## 2018-12-30 PROCEDURE — 96376 TX/PRO/DX INJ SAME DRUG ADON: CPT

## 2018-12-30 PROCEDURE — G0378 HOSPITAL OBSERVATION PER HR: HCPCS

## 2018-12-30 PROCEDURE — 94760 N-INVAS EAR/PLS OXIMETRY 1: CPT

## 2018-12-30 PROCEDURE — 63710000001 INSULIN REGULAR HUMAN PER 5 UNITS: Performed by: FAMILY MEDICINE

## 2018-12-30 RX ORDER — METHYLPREDNISOLONE SODIUM SUCCINATE 40 MG/ML
40 INJECTION, POWDER, LYOPHILIZED, FOR SOLUTION INTRAMUSCULAR; INTRAVENOUS EVERY 12 HOURS
Status: DISCONTINUED | OUTPATIENT
Start: 2018-12-30 | End: 2018-12-31

## 2018-12-30 RX ADMIN — CEFEPIME HYDROCHLORIDE 2 G: 2 INJECTION, POWDER, FOR SOLUTION INTRAVENOUS at 20:47

## 2018-12-30 RX ADMIN — HUMAN INSULIN 12 UNITS: 100 INJECTION, SOLUTION SUBCUTANEOUS at 08:45

## 2018-12-30 RX ADMIN — METHYLPREDNISOLONE SODIUM SUCCINATE 40 MG: 40 INJECTION, POWDER, FOR SOLUTION INTRAMUSCULAR; INTRAVENOUS at 02:45

## 2018-12-30 RX ADMIN — NEBIVOLOL HYDROCHLORIDE 5 MG: 5 TABLET ORAL at 20:54

## 2018-12-30 RX ADMIN — METHYLPREDNISOLONE SODIUM SUCCINATE 40 MG: 40 INJECTION, POWDER, FOR SOLUTION INTRAMUSCULAR; INTRAVENOUS at 20:55

## 2018-12-30 RX ADMIN — INSULIN DETEMIR 25 UNITS: 100 INJECTION, SOLUTION SUBCUTANEOUS at 20:35

## 2018-12-30 RX ADMIN — RANOLAZINE 1000 MG: 500 TABLET, FILM COATED, EXTENDED RELEASE ORAL at 08:46

## 2018-12-30 RX ADMIN — PRASUGREL HYDROCHLORIDE 10 MG: 10 TABLET, FILM COATED ORAL at 08:46

## 2018-12-30 RX ADMIN — PRAMIPEXOLE DIHYDROCHLORIDE 1 MG: 1 TABLET ORAL at 20:54

## 2018-12-30 RX ADMIN — RIVAROXABAN 20 MG: 10 TABLET, FILM COATED ORAL at 17:32

## 2018-12-30 RX ADMIN — NEBIVOLOL HYDROCHLORIDE 5 MG: 5 TABLET ORAL at 08:46

## 2018-12-30 RX ADMIN — CEFEPIME HYDROCHLORIDE 2 G: 2 INJECTION, POWDER, FOR SOLUTION INTRAVENOUS at 11:14

## 2018-12-30 RX ADMIN — SODIUM CHLORIDE, PRESERVATIVE FREE 10 ML: 5 INJECTION INTRAVENOUS at 20:38

## 2018-12-30 RX ADMIN — VANCOMYCIN HYDROCHLORIDE 2000 MG: 5 INJECTION, POWDER, LYOPHILIZED, FOR SOLUTION INTRAVENOUS at 17:32

## 2018-12-30 RX ADMIN — SERTRALINE HYDROCHLORIDE 25 MG: 25 TABLET ORAL at 20:54

## 2018-12-30 RX ADMIN — BUDESONIDE AND FORMOTEROL FUMARATE DIHYDRATE 2 PUFF: 160; 4.5 AEROSOL RESPIRATORY (INHALATION) at 21:23

## 2018-12-30 RX ADMIN — IPRATROPIUM BROMIDE AND ALBUTEROL SULFATE 3 ML: 2.5; .5 SOLUTION RESPIRATORY (INHALATION) at 08:57

## 2018-12-30 RX ADMIN — IPRATROPIUM BROMIDE AND ALBUTEROL SULFATE 3 ML: 2.5; .5 SOLUTION RESPIRATORY (INHALATION) at 12:46

## 2018-12-30 RX ADMIN — SODIUM CHLORIDE, PRESERVATIVE FREE 3 ML: 5 INJECTION INTRAVENOUS at 08:46

## 2018-12-30 RX ADMIN — VANCOMYCIN HYDROCHLORIDE 2500 MG: 5 INJECTION, POWDER, LYOPHILIZED, FOR SOLUTION INTRAVENOUS at 02:45

## 2018-12-30 RX ADMIN — SODIUM CHLORIDE, PRESERVATIVE FREE 10 ML: 5 INJECTION INTRAVENOUS at 01:29

## 2018-12-30 RX ADMIN — IPRATROPIUM BROMIDE AND ALBUTEROL SULFATE 3 ML: 2.5; .5 SOLUTION RESPIRATORY (INHALATION) at 21:23

## 2018-12-30 RX ADMIN — ISOSORBIDE MONONITRATE 120 MG: 60 TABLET, EXTENDED RELEASE ORAL at 20:53

## 2018-12-30 RX ADMIN — INSULIN ASPART 24 UNITS: 100 INJECTION, SOLUTION INTRAVENOUS; SUBCUTANEOUS at 17:32

## 2018-12-30 RX ADMIN — IPRATROPIUM BROMIDE AND ALBUTEROL SULFATE 3 ML: 2.5; .5 SOLUTION RESPIRATORY (INHALATION) at 17:23

## 2018-12-30 RX ADMIN — RANOLAZINE 1000 MG: 500 TABLET, FILM COATED, EXTENDED RELEASE ORAL at 20:54

## 2018-12-30 RX ADMIN — AMLODIPINE BESYLATE 10 MG: 10 TABLET ORAL at 20:54

## 2018-12-30 RX ADMIN — CEFEPIME HYDROCHLORIDE 2 G: 2 INJECTION, POWDER, FOR SOLUTION INTRAVENOUS at 01:29

## 2018-12-30 RX ADMIN — FAMOTIDINE 40 MG: 40 TABLET ORAL at 08:46

## 2018-12-30 RX ADMIN — INSULIN ASPART 24 UNITS: 100 INJECTION, SOLUTION INTRAVENOUS; SUBCUTANEOUS at 11:14

## 2018-12-30 RX ADMIN — TRAZODONE HYDROCHLORIDE 300 MG: 150 TABLET ORAL at 22:23

## 2018-12-30 RX ADMIN — SODIUM CHLORIDE, PRESERVATIVE FREE 10 ML: 5 INJECTION INTRAVENOUS at 02:46

## 2018-12-30 RX ADMIN — LISINOPRIL 40 MG: 40 TABLET ORAL at 20:54

## 2018-12-30 RX ADMIN — METHYLPREDNISOLONE SODIUM SUCCINATE 40 MG: 40 INJECTION, POWDER, FOR SOLUTION INTRAMUSCULAR; INTRAVENOUS at 08:46

## 2018-12-30 RX ADMIN — FLUTICASONE PROPIONATE 2 SPRAY: 50 SPRAY, METERED NASAL at 08:50

## 2018-12-30 RX ADMIN — BUDESONIDE AND FORMOTEROL FUMARATE DIHYDRATE 2 PUFF: 160; 4.5 AEROSOL RESPIRATORY (INHALATION) at 09:02

## 2018-12-30 RX ADMIN — SODIUM CHLORIDE, PRESERVATIVE FREE 3 ML: 5 INJECTION INTRAVENOUS at 20:38

## 2018-12-30 RX ADMIN — INSULIN ASPART 24 UNITS: 100 INJECTION, SOLUTION INTRAVENOUS; SUBCUTANEOUS at 20:35

## 2018-12-31 LAB
ALBUMIN SERPL-MCNC: 3.2 G/DL (ref 3.4–4.8)
ALBUMIN/GLOB SERPL: 1.1 G/DL (ref 1.1–1.8)
ALP SERPL-CCNC: 61 U/L (ref 38–126)
ALT SERPL W P-5'-P-CCNC: 23 U/L (ref 21–72)
ANION GAP SERPL CALCULATED.3IONS-SCNC: 8 MMOL/L (ref 5–15)
AST SERPL-CCNC: 15 U/L (ref 17–59)
BASOPHILS # BLD AUTO: 0.01 10*3/MM3 (ref 0–0.2)
BASOPHILS NFR BLD AUTO: 0.1 % (ref 0–2)
BILIRUB SERPL-MCNC: 0.3 MG/DL (ref 0.2–1.3)
BUN BLD-MCNC: 15 MG/DL (ref 7–21)
BUN/CREAT SERPL: 21.7 (ref 7–25)
CALCIUM SPEC-SCNC: 8.5 MG/DL (ref 8.4–10.2)
CHLORIDE SERPL-SCNC: 99 MMOL/L (ref 95–110)
CO2 SERPL-SCNC: 24 MMOL/L (ref 22–31)
CREAT BLD-MCNC: 0.69 MG/DL (ref 0.7–1.3)
DEPRECATED RDW RBC AUTO: 43.9 FL (ref 35.1–43.9)
EOSINOPHIL # BLD AUTO: 0.07 10*3/MM3 (ref 0–0.7)
EOSINOPHIL NFR BLD AUTO: 0.8 % (ref 0–7)
ERYTHROCYTE [DISTWIDTH] IN BLOOD BY AUTOMATED COUNT: 14.8 % (ref 11.5–14.5)
GFR SERPL CREATININE-BSD FRML MDRD: 127 ML/MIN/1.73 (ref 63–147)
GLOBULIN UR ELPH-MCNC: 2.8 GM/DL (ref 2.3–3.5)
GLUCOSE BLD-MCNC: 368 MG/DL (ref 60–100)
GLUCOSE BLDC GLUCOMTR-MCNC: 343 MG/DL (ref 70–130)
GLUCOSE BLDC GLUCOMTR-MCNC: 366 MG/DL (ref 70–130)
GLUCOSE BLDC GLUCOMTR-MCNC: 367 MG/DL (ref 70–130)
GLUCOSE BLDC GLUCOMTR-MCNC: 368 MG/DL (ref 70–130)
GLUCOSE BLDC GLUCOMTR-MCNC: 420 MG/DL (ref 70–130)
GLUCOSE BLDC GLUCOMTR-MCNC: 452 MG/DL (ref 70–130)
HCT VFR BLD AUTO: 34.8 % (ref 39–49)
HGB BLD-MCNC: 11.7 G/DL (ref 13.7–17.3)
IMM GRANULOCYTES # BLD AUTO: 0.05 10*3/MM3 (ref 0–0.02)
IMM GRANULOCYTES NFR BLD AUTO: 0.6 % (ref 0–0.5)
LYMPHOCYTES # BLD AUTO: 0.84 10*3/MM3 (ref 0.6–4.2)
LYMPHOCYTES NFR BLD AUTO: 9.5 % (ref 10–50)
MCH RBC QN AUTO: 27.3 PG (ref 26.5–34)
MCHC RBC AUTO-ENTMCNC: 33.6 G/DL (ref 31.5–36.3)
MCV RBC AUTO: 81.3 FL (ref 80–98)
MONOCYTES # BLD AUTO: 0.74 10*3/MM3 (ref 0–0.9)
MONOCYTES NFR BLD AUTO: 8.4 % (ref 0–12)
NEUTROPHILS # BLD AUTO: 7.14 10*3/MM3 (ref 2–8.6)
NEUTROPHILS NFR BLD AUTO: 80.6 % (ref 37–80)
PLATELET # BLD AUTO: 167 10*3/MM3 (ref 150–450)
PMV BLD AUTO: 9.4 FL (ref 8–12)
POTASSIUM BLD-SCNC: 4.3 MMOL/L (ref 3.5–5.1)
PROT SERPL-MCNC: 6 G/DL (ref 6.3–8.6)
RBC # BLD AUTO: 4.28 10*6/MM3 (ref 4.37–5.74)
SODIUM BLD-SCNC: 131 MMOL/L (ref 137–145)
VANCOMYCIN TROUGH SERPL-MCNC: 14.18 MCG/ML (ref 10–15)
WBC NRBC COR # BLD: 8.85 10*3/MM3 (ref 3.2–9.8)

## 2018-12-31 PROCEDURE — 94799 UNLISTED PULMONARY SVC/PX: CPT

## 2018-12-31 PROCEDURE — 85025 COMPLETE CBC W/AUTO DIFF WBC: CPT | Performed by: FAMILY MEDICINE

## 2018-12-31 PROCEDURE — 80053 COMPREHEN METABOLIC PANEL: CPT | Performed by: FAMILY MEDICINE

## 2018-12-31 PROCEDURE — 82962 GLUCOSE BLOOD TEST: CPT

## 2018-12-31 PROCEDURE — 63710000001 INSULIN DETEMIR PER 5 UNITS: Performed by: FAMILY MEDICINE

## 2018-12-31 PROCEDURE — 25010000002 CEFEPIME PER 500 MG: Performed by: FAMILY MEDICINE

## 2018-12-31 PROCEDURE — 96376 TX/PRO/DX INJ SAME DRUG ADON: CPT

## 2018-12-31 PROCEDURE — 94660 CPAP INITIATION&MGMT: CPT

## 2018-12-31 PROCEDURE — 63710000001 INSULIN ASPART PER 5 UNITS: Performed by: FAMILY MEDICINE

## 2018-12-31 PROCEDURE — 94760 N-INVAS EAR/PLS OXIMETRY 1: CPT

## 2018-12-31 PROCEDURE — 80202 ASSAY OF VANCOMYCIN: CPT | Performed by: FAMILY MEDICINE

## 2018-12-31 PROCEDURE — 25010000002 METHYLPREDNISOLONE PER 40 MG: Performed by: FAMILY MEDICINE

## 2018-12-31 PROCEDURE — 96366 THER/PROPH/DIAG IV INF ADDON: CPT

## 2018-12-31 PROCEDURE — 63710000001 INSULIN REGULAR HUMAN PER 5 UNITS: Performed by: FAMILY MEDICINE

## 2018-12-31 PROCEDURE — 25010000002 VANCOMYCIN 5 G RECONSTITUTED SOLUTION: Performed by: FAMILY MEDICINE

## 2018-12-31 PROCEDURE — G0378 HOSPITAL OBSERVATION PER HR: HCPCS

## 2018-12-31 RX ORDER — BACITRACIN ZINC 500 [USP'U]/G
OINTMENT TOPICAL DAILY
Status: DISCONTINUED | OUTPATIENT
Start: 2018-12-31 | End: 2019-01-01 | Stop reason: HOSPADM

## 2018-12-31 RX ORDER — PREDNISONE 20 MG/1
40 TABLET ORAL
Status: DISCONTINUED | OUTPATIENT
Start: 2019-01-01 | End: 2019-01-01 | Stop reason: HOSPADM

## 2018-12-31 RX ADMIN — IPRATROPIUM BROMIDE AND ALBUTEROL SULFATE 3 ML: 2.5; .5 SOLUTION RESPIRATORY (INHALATION) at 07:15

## 2018-12-31 RX ADMIN — FLUTICASONE PROPIONATE 2 SPRAY: 50 SPRAY, METERED NASAL at 09:00

## 2018-12-31 RX ADMIN — LISINOPRIL 40 MG: 40 TABLET ORAL at 23:02

## 2018-12-31 RX ADMIN — INSULIN DETEMIR 25 UNITS: 100 INJECTION, SOLUTION SUBCUTANEOUS at 22:58

## 2018-12-31 RX ADMIN — INSULIN ASPART 16 UNITS: 100 INJECTION, SOLUTION INTRAVENOUS; SUBCUTANEOUS at 08:57

## 2018-12-31 RX ADMIN — SODIUM CHLORIDE, PRESERVATIVE FREE 3 ML: 5 INJECTION INTRAVENOUS at 23:02

## 2018-12-31 RX ADMIN — PRASUGREL HYDROCHLORIDE 10 MG: 10 TABLET, FILM COATED ORAL at 08:58

## 2018-12-31 RX ADMIN — SERTRALINE HYDROCHLORIDE 25 MG: 25 TABLET ORAL at 23:01

## 2018-12-31 RX ADMIN — SODIUM CHLORIDE, PRESERVATIVE FREE 10 ML: 5 INJECTION INTRAVENOUS at 01:09

## 2018-12-31 RX ADMIN — SODIUM CHLORIDE, PRESERVATIVE FREE 10 ML: 5 INJECTION INTRAVENOUS at 17:25

## 2018-12-31 RX ADMIN — CEFEPIME HYDROCHLORIDE 2 G: 2 INJECTION, POWDER, FOR SOLUTION INTRAVENOUS at 15:40

## 2018-12-31 RX ADMIN — BUDESONIDE AND FORMOTEROL FUMARATE DIHYDRATE 2 PUFF: 160; 4.5 AEROSOL RESPIRATORY (INHALATION) at 07:15

## 2018-12-31 RX ADMIN — NEBIVOLOL HYDROCHLORIDE 5 MG: 5 TABLET ORAL at 23:00

## 2018-12-31 RX ADMIN — INSULIN ASPART 24 UNITS: 100 INJECTION, SOLUTION INTRAVENOUS; SUBCUTANEOUS at 17:15

## 2018-12-31 RX ADMIN — SODIUM CHLORIDE, PRESERVATIVE FREE 10 ML: 5 INJECTION INTRAVENOUS at 15:42

## 2018-12-31 RX ADMIN — AMLODIPINE BESYLATE 10 MG: 10 TABLET ORAL at 23:01

## 2018-12-31 RX ADMIN — METHYLPREDNISOLONE SODIUM SUCCINATE 40 MG: 40 INJECTION, POWDER, FOR SOLUTION INTRAMUSCULAR; INTRAVENOUS at 09:01

## 2018-12-31 RX ADMIN — INSULIN ASPART 20 UNITS: 100 INJECTION, SOLUTION INTRAVENOUS; SUBCUTANEOUS at 23:02

## 2018-12-31 RX ADMIN — INSULIN ASPART 20 UNITS: 100 INJECTION, SOLUTION INTRAVENOUS; SUBCUTANEOUS at 11:25

## 2018-12-31 RX ADMIN — CEFEPIME HYDROCHLORIDE 2 G: 2 INJECTION, POWDER, FOR SOLUTION INTRAVENOUS at 23:00

## 2018-12-31 RX ADMIN — SODIUM CHLORIDE, PRESERVATIVE FREE 10 ML: 5 INJECTION INTRAVENOUS at 01:38

## 2018-12-31 RX ADMIN — IPRATROPIUM BROMIDE AND ALBUTEROL SULFATE 3 ML: 2.5; .5 SOLUTION RESPIRATORY (INHALATION) at 10:37

## 2018-12-31 RX ADMIN — BUDESONIDE AND FORMOTEROL FUMARATE DIHYDRATE 2 PUFF: 160; 4.5 AEROSOL RESPIRATORY (INHALATION) at 19:18

## 2018-12-31 RX ADMIN — VANCOMYCIN HYDROCHLORIDE 2000 MG: 5 INJECTION, POWDER, LYOPHILIZED, FOR SOLUTION INTRAVENOUS at 17:25

## 2018-12-31 RX ADMIN — VANCOMYCIN HYDROCHLORIDE 2000 MG: 5 INJECTION, POWDER, LYOPHILIZED, FOR SOLUTION INTRAVENOUS at 09:01

## 2018-12-31 RX ADMIN — VANCOMYCIN HYDROCHLORIDE 2000 MG: 5 INJECTION, POWDER, LYOPHILIZED, FOR SOLUTION INTRAVENOUS at 01:38

## 2018-12-31 RX ADMIN — NEBIVOLOL HYDROCHLORIDE 5 MG: 5 TABLET ORAL at 08:59

## 2018-12-31 RX ADMIN — RIVAROXABAN 20 MG: 10 TABLET, FILM COATED ORAL at 17:15

## 2018-12-31 RX ADMIN — HUMAN INSULIN 10 UNITS: 100 INJECTION, SOLUTION SUBCUTANEOUS at 01:09

## 2018-12-31 RX ADMIN — CEFEPIME HYDROCHLORIDE 2 G: 2 INJECTION, POWDER, FOR SOLUTION INTRAVENOUS at 05:16

## 2018-12-31 RX ADMIN — RANOLAZINE 1000 MG: 500 TABLET, FILM COATED, EXTENDED RELEASE ORAL at 23:00

## 2018-12-31 RX ADMIN — SODIUM CHLORIDE, PRESERVATIVE FREE 3 ML: 5 INJECTION INTRAVENOUS at 09:03

## 2018-12-31 RX ADMIN — IPRATROPIUM BROMIDE AND ALBUTEROL SULFATE 3 ML: 2.5; .5 SOLUTION RESPIRATORY (INHALATION) at 19:18

## 2018-12-31 RX ADMIN — FAMOTIDINE 40 MG: 40 TABLET ORAL at 08:58

## 2018-12-31 RX ADMIN — ISOSORBIDE MONONITRATE 120 MG: 60 TABLET, EXTENDED RELEASE ORAL at 23:01

## 2018-12-31 RX ADMIN — IPRATROPIUM BROMIDE AND ALBUTEROL SULFATE 3 ML: 2.5; .5 SOLUTION RESPIRATORY (INHALATION) at 15:04

## 2018-12-31 RX ADMIN — RANOLAZINE 1000 MG: 500 TABLET, FILM COATED, EXTENDED RELEASE ORAL at 08:59

## 2018-12-31 RX ADMIN — PRAMIPEXOLE DIHYDROCHLORIDE 1 MG: 1 TABLET ORAL at 23:02

## 2018-12-31 RX ADMIN — BACITRACIN: 500 OINTMENT TOPICAL at 15:54

## 2019-01-01 VITALS
RESPIRATION RATE: 20 BRPM | TEMPERATURE: 96.6 F | DIASTOLIC BLOOD PRESSURE: 72 MMHG | WEIGHT: 315 LBS | OXYGEN SATURATION: 94 % | HEART RATE: 68 BPM | BODY MASS INDEX: 44.1 KG/M2 | SYSTOLIC BLOOD PRESSURE: 146 MMHG | HEIGHT: 71 IN

## 2019-01-01 LAB — GLUCOSE BLDC GLUCOMTR-MCNC: 249 MG/DL (ref 70–130)

## 2019-01-01 PROCEDURE — 96366 THER/PROPH/DIAG IV INF ADDON: CPT

## 2019-01-01 PROCEDURE — 25010000002 CEFEPIME PER 500 MG: Performed by: FAMILY MEDICINE

## 2019-01-01 PROCEDURE — 63710000001 PREDNISONE PER 1 MG: Performed by: HOSPITALIST

## 2019-01-01 PROCEDURE — 82962 GLUCOSE BLOOD TEST: CPT

## 2019-01-01 PROCEDURE — 94799 UNLISTED PULMONARY SVC/PX: CPT

## 2019-01-01 PROCEDURE — 25010000002 VANCOMYCIN 5 G RECONSTITUTED SOLUTION: Performed by: FAMILY MEDICINE

## 2019-01-01 PROCEDURE — G0378 HOSPITAL OBSERVATION PER HR: HCPCS

## 2019-01-01 PROCEDURE — 94760 N-INVAS EAR/PLS OXIMETRY 1: CPT

## 2019-01-01 PROCEDURE — 94660 CPAP INITIATION&MGMT: CPT

## 2019-01-01 PROCEDURE — 63710000001 INSULIN ASPART PER 5 UNITS: Performed by: FAMILY MEDICINE

## 2019-01-01 RX ORDER — PREDNISONE 20 MG/1
10 TABLET ORAL
Qty: 5 TABLET | Refills: 0 | Status: SHIPPED | OUTPATIENT
Start: 2019-01-02 | End: 2019-01-07

## 2019-01-01 RX ADMIN — NEBIVOLOL HYDROCHLORIDE 5 MG: 5 TABLET ORAL at 08:42

## 2019-01-01 RX ADMIN — IPRATROPIUM BROMIDE AND ALBUTEROL SULFATE 3 ML: 2.5; .5 SOLUTION RESPIRATORY (INHALATION) at 07:52

## 2019-01-01 RX ADMIN — SODIUM CHLORIDE, PRESERVATIVE FREE 3 ML: 5 INJECTION INTRAVENOUS at 08:44

## 2019-01-01 RX ADMIN — INSULIN ASPART 8 UNITS: 100 INJECTION, SOLUTION INTRAVENOUS; SUBCUTANEOUS at 08:40

## 2019-01-01 RX ADMIN — BUDESONIDE AND FORMOTEROL FUMARATE DIHYDRATE 2 PUFF: 160; 4.5 AEROSOL RESPIRATORY (INHALATION) at 07:52

## 2019-01-01 RX ADMIN — PRASUGREL HYDROCHLORIDE 10 MG: 10 TABLET, FILM COATED ORAL at 08:42

## 2019-01-01 RX ADMIN — FLUTICASONE PROPIONATE 2 SPRAY: 50 SPRAY, METERED NASAL at 08:45

## 2019-01-01 RX ADMIN — PREDNISONE 40 MG: 20 TABLET ORAL at 08:42

## 2019-01-01 RX ADMIN — CEFEPIME HYDROCHLORIDE 2 G: 2 INJECTION, POWDER, FOR SOLUTION INTRAVENOUS at 05:18

## 2019-01-01 RX ADMIN — FAMOTIDINE 40 MG: 40 TABLET ORAL at 08:42

## 2019-01-01 RX ADMIN — VANCOMYCIN HYDROCHLORIDE 2000 MG: 5 INJECTION, POWDER, LYOPHILIZED, FOR SOLUTION INTRAVENOUS at 02:07

## 2019-01-01 RX ADMIN — BACITRACIN: 500 OINTMENT TOPICAL at 08:43

## 2019-01-01 RX ADMIN — RANOLAZINE 1000 MG: 500 TABLET, FILM COATED, EXTENDED RELEASE ORAL at 08:41

## 2019-01-01 NOTE — NURSING NOTE
Patient left his paperwork in the room upon dismissal.  Attempted to contact patient to let them know with no answer.  Will keep paperwork at the desk in case they come back for it.  Prescriptions were called into his pharmacy.

## 2019-01-01 NOTE — DISCHARGE SUMMARY
[facility] Medicine Services  DISCHARGE SUMMARY       Date of Admission: 12/28/2018  Date of Discharge:  1/1/2019  Primary Care Physician: Ruslan Gonzalez APRN    Presenting Problem/History of Present Illness:  Sepsis, due to unspecified organism (CMS/[facility]) [A41.9]  Respiratory failure, acute (CMS/[facility]) [J96.00]     Final Discharge Diagnoses:  Active Hospital Problems    Diagnosis   • Sepsis (CMS/[facility])       Consults:   Consults     No orders found from 11/29/2018 to 12/29/2018.                          Pertinent Test Results:    Laboratory Data:   Results from last 7 days   Lab Units  12/31/18   0602  12/30/18   0619  12/29/18 0418 12/28/18   1905   SODIUM mmol/L  131*  132*  132*  131*   POTASSIUM mmol/L  4.3  4.6  3.7  3.5   CHLORIDE mmol/L  99  98  100  96   CO2 mmol/L  24.0  24.0  22.0  26.0   BUN mg/dL  15  16  6*  6*   CREATININE mg/dL  0.69*  0.72  0.65*  0.68*   GLUCOSE mg/dL  368*  417*  250*  261*   CALCIUM mg/dL  8.5  8.6  8.2*  8.7   BILIRUBIN mg/dL  0.3  0.5   --   0.7   ALK PHOS U/L  61  70   --   87   ALT (SGPT) U/L  23  29   --   25   AST (SGOT) U/L  15*  20   --   38   ANION GAP mmol/L  8.0  10.0  10.0  9.0     Estimated Creatinine Clearance: 259.7 mL/min (A) (by C-G formula based on SCr of 0.69 mg/dL (L)).  Results from last 7 days   Lab Units  12/29/18   0418   MAGNESIUM mg/dL  2.0         Results from last 7 days   Lab Units  12/31/18   0602  12/30/18   0619  12/29/18   0419 12/28/18   1905   WBC 10*3/mm3  8.85  7.72  7.27  5.68   HEMOGLOBIN g/dL  11.7*  11.9*  13.0*  13.6*   HEMATOCRIT %  34.8*  34.6*  37.9*  38.9*   PLATELETS 10*3/mm3  167  143*  115*  108*           Culture Data:   No results found for: BLOODCX  No results found for: URINECX  No results found for: RESPCX  No results found for: WOUNDCX  No results found for: STOOLCX  No components found for: BODYFLD    Microbiology  Blood Culture   Date Value Ref Range Status   12/28/2018 No growth at  3 days  Preliminary                            Radiology Data:   Imaging Results (all)     Procedure Component Value Units Date/Time    US Venous Doppler Lower Extremity Bilateral (duplex) [[id number]] Collected:  12/29/18 1450     Updated:  12/29/18 1640    Narrative:         Ultrasound venous duplex bilateral lower extremities    HISTORY: Elevated d-dimer. Sepsis.    Duplex ultrasound of the deep venous system of each lower  extremity was performed    Real-time images demonstrate normal compressibility without  evidence of intraluminal thrombus.  Doppler shows phasic flow and augmentation.  Color Doppler also reveals venous patency.      Impression:       CONCLUSION:  No ultrasound evidence of deep venous thrombosis of either lower  extremity.    [id number]    Electronically signed by:  Kulwant Currie MD  12/29/2018 4:39 PM  CST Workstation: [id number]    CT Angiogram Chest With Contrast [[id number]] Collected:  12/28/18 2107     Updated:  12/28/18 2150    Narrative:       Exam: CT angiogram chest with contrast    INDICATION: Dyspnea    TECHNIQUE: Routine CT angiogram chest with contrast.  Computer-generated 3-D reconstructions/MIPS were obtained. This  exam was performed according to our departmental  dose-optimization program, which includes automated exposure  control, adjustment of the mA and/or kV according to patient size  and/or use of iterative reconstruction technique.    COMPARISON: 12/9/2018    FINDINGS: No mediastinal or hilar lymphadenopathy. No thoracic  aortic aneurysm or dissection. Limited evaluation of the  pulmonary arterial tree due to the diminished enhancement and  body habitus. No obvious intraluminal thrombus. Heart size has  normal. Lungs are clear. No pneumothorax or pleural effusion    Status post cholecystectomy. Hepatosplenomegaly is present.    Bony structures are intact.      Impression:       1. No obvious pulmonary embolus   2. No obvious acute abnormality    Electronically signed by:  Brennan  Shekhar VIDAL  12/28/2018 9:49 PM  CST Workstation: [id number]    XR Chest 1 View [[id number]] Collected:  12/28/18 1954     Updated:  12/28/18 2010    Narrative:         PORTABLE CHEST    HISTORY: Dyspnea    Portable AP upright film of the chest was obtained at 7:53 PM.  COMPARISON: December 13, 2018      EKG leads.  The lungs are clear of an acute process.  The heart is not enlarged.  The pulmonary vasculature is not increased.  No pleural effusion.  No pneumothorax.  No acute osseous abnormality.  Old left rib fractures.      Impression:       CONCLUSION:  No Acute Disease    [id number]    Electronically signed by:  Kulwant Currie MD  12/28/2018 8:09 PM  CST Workstation: [id number]          Chief Complaint on Day of Discharge: none    HPI:  The patient is a 38-year-old white male has a concurrent health history significant for morbid obesity, obstructive sleep apnea on CPAP, hypertension, insulin dependent diabetes mellitus type 2, CAD status post placement of 2 stents, and a factor II mutation and hypercoagulable state with a prior pulmonary embolus on Xarelto.  The patient is present as he has had a prior admission for bronchitis and was recently in the hospital here for a lactic acidosis.     Patient presents for ongoing shortness of breath.  The patient reports a productive yellow sputum and cough.  Fevers of 103 at home.  In the emergency department the patient had a temperature 102.8, respirations 30 and his heart rate 114.  The patient was noted to have ketones in his urine is glucose was 261 and his sodium was 131.  He had a lactic acid 2.8 and elevated d-dimer of 1406 without a PE on CTA.  The patient will be transitioned to the floor on the BiPAP machine for further management.         Hospital Course:    Pt was admitted with increased sob .   He was found to have acute on chronic hypoxic respiratory failure  He was treated with oxygen, bronchodilators and IV steroids and BiPAP  He was also treated with IV  vancomycin.  Sputum and blood cultures were negative.  He did not have leukocytoses upon admission but it went up to 15,000 secondary to being on steroids, and normalized prior to discharge and remained afebrile.  Cardiac enzymes were negative.  He was continued on Xarelto for history of PE.  He feels much better shortness of breath has resolved and he is back to baseline.    Condition on Discharge:  Improved and Stable    Physical Exam on Discharge:  Temp:  [96.7 °F (35.9 °C)-98.2 °F (36.8 °C)] 97.3 °F (36.3 °C)  Heart Rate:  [54-82] 74  Resp:  [18-26] 18  BP: (119-148)/(60-84) 119/71  FiO2 (%):  [35 %] 35 %    Constitutional: Patient is AAO x 3.   Cardiovascular: Normal rate, regular rhythm, normal heart sounds and intact distal pulses.  Exam reveals no gallop and no friction rub.  No murmur heard.  Pulmonary/Chest: Decreased breath sounds , no respiratory distress. No wheezes, rales or rhonchi.  Abdominal: Soft. Bowel sounds are normal. No distension, no tenderness. There is no rebound and no guarding. No hernia.   Musculoskeletal: No cyanosis, clubbing or edema.    Discharge Disposition:  Home or Self Care home    Discharge Medications:     Discharge Medications      New Medications      Instructions Start Date   predniSONE 20 MG tablet  Commonly known as:  DELTASONE   10 mg, Oral, Daily With Breakfast         Changes to Medications      Instructions Start Date   insulin aspart 100 UNIT/ML injection  Commonly known as:  novoLOG  What changed:  how much to take   25 Units, Subcutaneous, 3 Times Daily Before Meals      isosorbide mononitrate 120 MG 24 hr tablet  Commonly known as:  IMDUR  What changed:  when to take this   120 mg, Oral, Daily      lisinopril 40 MG tablet  Commonly known as:  PRINIVIL,ZESTRIL  What changed:  when to take this   40 mg, Oral, Every Morning      rivaroxaban 20 MG tablet  Commonly known as:  XARELTO  What changed:  when to take this   20 mg, Oral, Daily      sertraline 25 MG  "tablet  Commonly known as:  ZOLOFT  What changed:  when to take this   25 mg, Oral, Daily         Continue These Medications      Instructions Start Date   albuterol sulfate  (90 Base) MCG/ACT inhaler  Commonly known as:  PROVENTIL HFA;VENTOLIN HFA;PROAIR HFA   2 puffs, Inhalation, Every 4 Hours PRN      albuterol 1.25 MG/3ML nebulizer solution  Commonly known as:  ACCUNEB   1 ampule, Nebulization, Every 6 Hours PRN      amLODIPine 10 MG tablet  Commonly known as:  NORVASC   10 mg, Oral, Nightly      budesonide-formoterol 160-4.5 MCG/ACT inhaler  Commonly known as:  SYMBICORT   2 puffs, Inhalation, 2 Times Daily - RT      BYSTOLIC 10 MG tablet  Generic drug:  nebivolol   5 mg, 2 Times Daily      fluticasone 50 MCG/ACT nasal spray  Commonly known as:  FLONASE   2 sprays, Each Nare, Daily      glucose monitor monitoring kit   1 each, Does not apply, As Needed      MICROLET LANCETS misc   One touch delica lancets      nitroglycerin 0.4 MG SL tablet  Commonly known as:  NITROSTAT   0.4 mg, Sublingual, Every 5 Minutes PRN, Take no more than 3 doses in 15 minutes.      pantoprazole 40 MG EC tablet  Commonly known as:  PROTONIX   40 mg, Oral, Nightly      pramipexole 1 MG tablet  Commonly known as:  MIRAPEX   TAKE TWO TABLETS BY MOUTH EVERY NIGHT      prasugrel 10 MG tablet  Commonly known as:  EFFIENT   10 mg, Oral      ranolazine 1000 MG 12 hr tablet  Commonly known as:  RANEXA   1,000 mg, Oral, Every 12 Hours Scheduled      RELION INSULIN SYRINGE 31G X 15/64\" 0.5 ML misc  Generic drug:  Insulin Syringe-Needle U-100   No dose, route, or frequency recorded.      traZODone 300 MG tablet  Commonly known as:  DESYREL   TAKE ONE TABLET BY MOUTH EVERY NIGHT      TRESIBA FLEXTOUCH 100 UNIT/ML solution pen-injector injection  Generic drug:  insulin degludec   75 Units, Subcutaneous, Nightly      VICTOZA SC   Subcutaneous             Discharge Diet:   Diet Instructions     Diet: Consistent Carbohydrate      Discharge Diet: "  Consistent Carbohydrate        ADA  Activity at Discharge:   Activity Instructions     Activity as Tolerated            All the abnormal findings were discussed with the patient in detail and the patient verbalized understanding of needing to follow up with PCP and other specialties.  Outpatient follow up for further evaluation and management.    Discharge Care Plan/Instructions: follow up with PCP in 1 week    Follow-up Appointments:   Future Appointments   Date Time Provider Department Center   8/1/2019  1:00 PM Antony Jenkins MD PhD [facility] None       Test Results Pending at Discharge:    Order Current Status    Blood Culture - Blood, Arm, Left Preliminary result    Blood Culture - Blood, Arm, Right Preliminary result           This document has been electronically signed by Trenton Barrett MD on January 1, 2019 9:50 AM

## 2019-01-02 ENCOUNTER — READMISSION MANAGEMENT (OUTPATIENT)
Dept: CALL CENTER | Facility: HOSPITAL | Age: 39
End: 2019-01-02

## 2019-01-02 LAB
BACTERIA SPEC AEROBE CULT: NORMAL
BACTERIA SPEC AEROBE CULT: NORMAL
GLUCOSE BLDC GLUCOMTR-MCNC: 400 MG/DL (ref 70–130)
GLUCOSE BLDC GLUCOMTR-MCNC: 429 MG/DL (ref 70–130)

## 2019-01-02 NOTE — OUTREACH NOTE
Prep Survey      Responses   Facility patient discharged from?  [facility]   Is patient eligible?  Yes   Discharge diagnosis  Sepsis due to unspecified organism, Resp. failure acute   Does the patient have one of the following disease processes/diagnoses(primary or secondary)?  Sepsis   Does the patient have Home health ordered?  No   Is there a DME ordered?  No   Comments regarding appointments  See AVS.   Prep survey completed?  Yes          Radha Merino RN

## 2019-01-03 ENCOUNTER — EPISODE CHANGES (OUTPATIENT)
Dept: CASE MANAGEMENT | Facility: OTHER | Age: 39
End: 2019-01-03

## 2019-01-03 ENCOUNTER — READMISSION MANAGEMENT (OUTPATIENT)
Dept: CALL CENTER | Facility: HOSPITAL | Age: 39
End: 2019-01-03

## 2019-01-03 NOTE — OUTREACH NOTE
Sepsis Week 1 Survey      Responses   Facility patient discharged from?  [place]   Does the patient have one of the following disease processes/diagnoses(primary or secondary)?  Sepsis   Is there a successful TCM telephone encounter documented?  No   Week 1 attempt successful?  No   Unsuccessful attempts  Attempt 1          Gisella Patel RN

## 2019-01-04 ENCOUNTER — HOSPITAL ENCOUNTER (EMERGENCY)
Facility: HOSPITAL | Age: 39
Discharge: HOME | End: 2019-01-04
Attending: FAMILY MEDICINE | Admitting: FAMILY MEDICINE

## 2019-01-04 ENCOUNTER — APPOINTMENT (OUTPATIENT)
Dept: GENERAL RADIOLOGY | Facility: HOSPITAL | Age: 39
End: 2019-01-04

## 2019-01-04 VITALS
SYSTOLIC BLOOD PRESSURE: 175 MMHG | DIASTOLIC BLOOD PRESSURE: 79 MMHG | HEART RATE: 86 BPM | OXYGEN SATURATION: 97 % | BODY MASS INDEX: 44.1 KG/M2 | HEIGHT: 71 IN | TEMPERATURE: 97.3 F | RESPIRATION RATE: 20 BRPM | WEIGHT: 315 LBS

## 2019-01-04 DIAGNOSIS — J44.1 CHRONIC OBSTRUCTIVE PULMONARY DISEASE WITH ACUTE EXACERBATION (HCC): Primary | ICD-10-CM

## 2019-01-04 DIAGNOSIS — J40 BRONCHITIS: ICD-10-CM

## 2019-01-04 LAB
ALBUMIN SERPL-MCNC: 3.5 G/DL (ref 3.4–4.8)
ALBUMIN/GLOB SERPL: 1.2 G/DL (ref 1.1–1.8)
ALP SERPL-CCNC: 76 U/L (ref 38–126)
ALT SERPL W P-5'-P-CCNC: 31 U/L (ref 21–72)
ANION GAP SERPL CALCULATED.3IONS-SCNC: 6 MMOL/L (ref 5–15)
ARTERIAL PATENCY WRIST A: ABNORMAL
AST SERPL-CCNC: 31 U/L (ref 17–59)
ATMOSPHERIC PRESS: 741 MMHG
BASE EXCESS BLDA CALC-SCNC: 5.8 MMOL/L (ref 0–2)
BDY SITE: ABNORMAL
BILIRUB SERPL-MCNC: 0.5 MG/DL (ref 0.2–1.3)
BUN BLD-MCNC: 10 MG/DL (ref 7–21)
BUN/CREAT SERPL: 13.2 (ref 7–25)
CALCIUM SPEC-SCNC: 8.8 MG/DL (ref 8.4–10.2)
CHLORIDE SERPL-SCNC: 95 MMOL/L (ref 95–110)
CO2 SERPL-SCNC: 33 MMOL/L (ref 22–31)
CREAT BLD-MCNC: 0.76 MG/DL (ref 0.7–1.3)
D-DIMER, QUANTITATIVE (MAD,POW, STR): 1004 NG/ML (FEU) (ref 0–470)
DEPRECATED RDW RBC AUTO: 42.3 FL (ref 35.1–43.9)
EOSINOPHIL # BLD MANUAL: 0.62 10*3/MM3 (ref 0–0.7)
EOSINOPHIL NFR BLD MANUAL: 9 % (ref 0–7)
ERYTHROCYTE [DISTWIDTH] IN BLOOD BY AUTOMATED COUNT: 14.6 % (ref 11.5–14.5)
GAS FLOW AIRWAY: 3 LPM
GFR SERPL CREATININE-BSD FRML MDRD: 114 ML/MIN/1.73 (ref 63–147)
GLOBULIN UR ELPH-MCNC: 3 GM/DL (ref 2.3–3.5)
GLUCOSE BLD-MCNC: 293 MG/DL (ref 60–100)
HCO3 BLDA-SCNC: 30.5 MMOL/L (ref 20–26)
HCT VFR BLD AUTO: 40.2 % (ref 39–49)
HGB BLD-MCNC: 13.7 G/DL (ref 13.7–17.3)
HOLD SPECIMEN: NORMAL
HOLD SPECIMEN: NORMAL
LYMPHOCYTES # BLD MANUAL: 1.11 10*3/MM3 (ref 0.6–4.2)
LYMPHOCYTES NFR BLD MANUAL: 11 % (ref 0–12)
LYMPHOCYTES NFR BLD MANUAL: 16 % (ref 10–50)
Lab: ABNORMAL
MCH RBC QN AUTO: 27.3 PG (ref 26.5–34)
MCHC RBC AUTO-ENTMCNC: 34.1 G/DL (ref 31.5–36.3)
MCV RBC AUTO: 80.1 FL (ref 80–98)
METAMYELOCYTES NFR BLD MANUAL: 4 % (ref 0–0)
MODALITY: ABNORMAL
MONOCYTES # BLD AUTO: 0.76 10*3/MM3 (ref 0–0.9)
MYELOCYTES NFR BLD MANUAL: 1 % (ref 0–0)
NEUTROPHILS # BLD AUTO: 4.08 10*3/MM3 (ref 2–8.6)
NEUTROPHILS NFR BLD MANUAL: 53 % (ref 37–80)
NEUTS BAND NFR BLD MANUAL: 6 % (ref 0–5)
NT-PROBNP SERPL-MCNC: 90.2 PG/ML (ref 0–450)
PCO2 BLDA: 43.5 MM HG (ref 35–45)
PH BLDA: 7.45 PH UNITS (ref 7.35–7.45)
PLAT MORPH BLD: NORMAL
PLATELET # BLD AUTO: 162 10*3/MM3 (ref 150–450)
PMV BLD AUTO: 9 FL (ref 8–12)
PO2 BLDA: 87.9 MM HG (ref 83–108)
POTASSIUM BLD-SCNC: 3.5 MMOL/L (ref 3.5–5.1)
PROT SERPL-MCNC: 6.5 G/DL (ref 6.3–8.6)
RBC # BLD AUTO: 5.02 10*6/MM3 (ref 4.37–5.74)
RBC MORPH BLD: NORMAL
SAO2 % BLDCOA: 97.5 % (ref 94–99)
SODIUM BLD-SCNC: 134 MMOL/L (ref 137–145)
TROPONIN I SERPL-MCNC: <0.012 NG/ML
VENTILATOR MODE: ABNORMAL
WBC MORPH BLD: NORMAL
WBC NRBC COR # BLD: 6.91 10*3/MM3 (ref 3.2–9.8)
WHOLE BLOOD HOLD SPECIMEN: NORMAL
WHOLE BLOOD HOLD SPECIMEN: NORMAL

## 2019-01-04 PROCEDURE — 25010000002 METHYLPREDNISOLONE PER 125 MG: Performed by: FAMILY MEDICINE

## 2019-01-04 PROCEDURE — 36600 WITHDRAWAL OF ARTERIAL BLOOD: CPT

## 2019-01-04 PROCEDURE — 96374 THER/PROPH/DIAG INJ IV PUSH: CPT

## 2019-01-04 PROCEDURE — 94760 N-INVAS EAR/PLS OXIMETRY 1: CPT

## 2019-01-04 PROCEDURE — 99284 EMERGENCY DEPT VISIT MOD MDM: CPT

## 2019-01-04 PROCEDURE — 80053 COMPREHEN METABOLIC PANEL: CPT | Performed by: FAMILY MEDICINE

## 2019-01-04 PROCEDURE — 85007 BL SMEAR W/DIFF WBC COUNT: CPT | Performed by: FAMILY MEDICINE

## 2019-01-04 PROCEDURE — 82803 BLOOD GASES ANY COMBINATION: CPT

## 2019-01-04 PROCEDURE — 71046 X-RAY EXAM CHEST 2 VIEWS: CPT

## 2019-01-04 PROCEDURE — 94799 UNLISTED PULMONARY SVC/PX: CPT

## 2019-01-04 PROCEDURE — 84484 ASSAY OF TROPONIN QUANT: CPT | Performed by: FAMILY MEDICINE

## 2019-01-04 PROCEDURE — 83880 ASSAY OF NATRIURETIC PEPTIDE: CPT | Performed by: FAMILY MEDICINE

## 2019-01-04 PROCEDURE — 93005 ELECTROCARDIOGRAM TRACING: CPT | Performed by: FAMILY MEDICINE

## 2019-01-04 PROCEDURE — 93005 ELECTROCARDIOGRAM TRACING: CPT

## 2019-01-04 PROCEDURE — 94640 AIRWAY INHALATION TREATMENT: CPT

## 2019-01-04 PROCEDURE — 93010 ELECTROCARDIOGRAM REPORT: CPT | Performed by: INTERNAL MEDICINE

## 2019-01-04 PROCEDURE — 85025 COMPLETE CBC W/AUTO DIFF WBC: CPT | Performed by: FAMILY MEDICINE

## 2019-01-04 PROCEDURE — 85379 FIBRIN DEGRADATION QUANT: CPT | Performed by: FAMILY MEDICINE

## 2019-01-04 RX ORDER — IPRATROPIUM BROMIDE AND ALBUTEROL SULFATE 2.5; .5 MG/3ML; MG/3ML
3 SOLUTION RESPIRATORY (INHALATION)
Status: DISCONTINUED | OUTPATIENT
Start: 2019-01-04 | End: 2019-01-04 | Stop reason: HOSPADM

## 2019-01-04 RX ORDER — METHYLPREDNISOLONE SODIUM SUCCINATE 125 MG/2ML
80 INJECTION, POWDER, LYOPHILIZED, FOR SOLUTION INTRAMUSCULAR; INTRAVENOUS ONCE
Status: COMPLETED | OUTPATIENT
Start: 2019-01-04 | End: 2019-01-04

## 2019-01-04 RX ORDER — SODIUM CHLORIDE 0.9 % (FLUSH) 0.9 %
10 SYRINGE (ML) INJECTION AS NEEDED
Status: DISCONTINUED | OUTPATIENT
Start: 2019-01-04 | End: 2019-01-04 | Stop reason: HOSPADM

## 2019-01-04 RX ADMIN — METHYLPREDNISOLONE SODIUM SUCCINATE 80 MG: 125 INJECTION, POWDER, FOR SOLUTION INTRAMUSCULAR; INTRAVENOUS at 18:33

## 2019-01-04 RX ADMIN — IPRATROPIUM BROMIDE AND ALBUTEROL SULFATE 3 ML: 2.5; .5 SOLUTION RESPIRATORY (INHALATION) at 18:37

## 2019-01-04 NOTE — ED PROVIDER NOTES
Subjective     History provided by:  Patient   used: No    Shortness of Breath   Severity:  Moderate  Onset quality:  Gradual  Timing:  Constant  Progression:  Unchanged  Chronicity:  Recurrent  Context: activity    Relieved by:  Nothing  Worsened by:  Activity  Ineffective treatments:  None tried  Associated symptoms: cough and wheezing    Associated symptoms: no chest pain, no diaphoresis and no vomiting    Cough:     Cough characteristics:  Productive    Sputum characteristics:  Nondescript    Severity:  Moderate    Onset quality:  Gradual    Duration:  1 week    Timing:  Intermittent    Progression:  Unchanged    Chronicity:  Recurrent  Wheezing:     Severity:  Mild    Onset quality:  Gradual    Timing:  Constant    Progression:  Unchanged    Patient was recently discharged from the hospital for sob.  Review of Systems   Constitutional: Negative for diaphoresis.   Respiratory: Positive for cough, shortness of breath and wheezing.    Cardiovascular: Negative for chest pain.   Gastrointestinal: Negative for vomiting.   All other systems reviewed and are negative.      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HCC)    • Fatty liver    • GERD (gastroesophageal reflux disease)    • Hyperlipidemia    • Hypertension    • Morbid obesity (CMS/HCC)    • Pulmonary embolism (CMS/HCC)    • RLS (restless legs syndrome)    • Seizures (CMS/HCC)     last one many years ago   • Sleep apnea     c-pap       Allergies   Allergen Reactions   • Glipizide Other (See Comments) and Unknown (See Comments)     Slurred Speech  Slurred Speech  Hallucinations, Slurred Speech   • Metoclopramide Anxiety   • Tramadol Other (See Comments)     seizures   • Risperidone Other (See Comments)     Slurred speech  Can't stand, trouble breathing, slurred speech         Past  Surgical History:   Procedure Laterality Date   • ADENOIDECTOMY     • CARDIAC CATHETERIZATION N/A 3/15/2017    Procedure: Left Heart Cath;  Surgeon: Edwige Briceno MD;  Location: [facility] CATH INVASIVE LOCATION;  Service:    • CARDIAC CATHETERIZATION N/A 3/15/2017    Procedure: Stent SOHPIA coronary;  Surgeon: Edwige Briceno MD;  Location: [facility] CATH INVASIVE LOCATION;  Service:    • CARDIAC CATHETERIZATION N/A 3/1/2018    Procedure: Left Heart Cath;  Surgeon: Conor Parsons MD;  Location: [facility] CATH INVASIVE LOCATION;  Service:    • CHOLECYSTECTOMY      lap   • CORONARY ANGIOPLASTY WITH STENT PLACEMENT     • DC RT/LT HEART CATHETERS N/A 3/15/2017    Procedure: Percutaneous Coronary Intervention;  Surgeon: Edwige Briceno MD;  Location: [facility] CATH INVASIVE LOCATION;  Service: Cardiovascular   • TONSILLECTOMY     • TRANSESOPHAGEAL ECHOCARDIOGRAM ([place])         Family History   Problem Relation Age of Onset   • Heart disease Mother    • Hypertension Mother    • Hyperlipidemia Mother    • Coronary artery disease Mother    • Diabetes Mother    • Obesity Mother    • Kidney failure Mother    • Sleep apnea Father    • Cancer Paternal Grandfather        Social History     Socioeconomic History   • Marital status:      Spouse name: Cori   • Number of children: 0   • Years of education: Not on file   • Highest education level: Not on file   Occupational History   • Occupation: Disabled   Tobacco Use   • Smoking status: Former Smoker     Packs/day: 0.25     Years: 0.50     Pack years: 0.12     Types: Cigarettes     Last attempt to quit:      Years since quittin.0   • Smokeless tobacco: Current User     Types: Snuff   • Tobacco comment: quit smoking 25 years ago; he does use snuff   Substance and Sexual Activity   • Alcohol use: No   • Drug use: No   • Sexual activity: Defer       BP (!) 181/85 (BP Location: Right arm, Patient Position: Lying)   Pulse 75   Temp 97.3 °F (36.3 °C) (Oral)   Resp 22   Ht 180.3 cm  "(71\")   Wt (!) 209 kg (460 lb)   SpO2 99%   BMI 64.16 kg/m²     Objective   Physical Exam   Constitutional: He is oriented to person, place, and time. He appears well-developed and well-nourished.   HENT:   Head: Normocephalic and atraumatic.   Neck: Normal range of motion. Neck supple.   Cardiovascular: Normal rate, regular rhythm and normal heart sounds.   Pulmonary/Chest: Effort normal. He has wheezes.   Abdominal: Soft. Bowel sounds are normal.   Musculoskeletal: Normal range of motion.        Right lower leg: Normal.        Left lower leg: Normal.   Neurological: He is alert and oriented to person, place, and time.   Skin: Skin is warm. Capillary refill takes less than 2 seconds.   Nursing note and vitals reviewed.      Procedures           ED Course        Labs Reviewed   COMPREHENSIVE METABOLIC PANEL - Abnormal; Notable for the following components:       Result Value    Glucose 293 (*)     Sodium 134 (*)     CO2 33.0 (*)     All other components within normal limits   CBC WITH AUTO DIFFERENTIAL - Abnormal; Notable for the following components:    RDW 14.6 (*)     All other components within normal limits   BLOOD GAS, ARTERIAL - Abnormal; Notable for the following components:    pH, Arterial 7.454 (*)     HCO3, Arterial 30.5 (*)     Base Excess, Arterial 5.8 (*)     All other components within normal limits   BNP (IN-HOUSE) - Normal   TROPONIN (IN-HOUSE) - Normal   RAINBOW DRAW    Narrative:     The following orders were created for panel order [facility] Draw.  Procedure                               Abnormality         Status                     ---------                               -----------         ------                     Light Blue Top[[id number]]                                   In process                 Green Top (Gel)[[id number]]                                  In process                 Lavender Top[[id number]]                                     In process                 Gold Top - " SST[[id number]]                                   In process                   Please view results for these tests on the individual orders.   BLOOD GAS, ARTERIAL   D-DIMER, QUANTITATIVE   CBC AND DIFFERENTIAL    Narrative:     The following orders were created for panel order CBC & Differential.  Procedure                               Abnormality         Status                     ---------                               -----------         ------                     Manual Differential[[id number]]                              In process                 Scan Slide[[id number]]                                                                  CBC Auto Differential[[id number]]        Abnormal            Final result                 Please view results for these tests on the individual orders.   LIGHT BLUE TOP   GREEN TOP   LAVENDER TOP   GOLD TOP - SST   MANUAL DIFFERENTIAL       XR Chest 2 View   Final Result   CONCLUSION:          1. No evidence of an active cardiopulmonary process.                                                       Electronically signed by:  MIGUEL A Soni MD  1/4/2019 6:04 PM   [facility] Workstation: [id number]          result discussed with patient and he understand. Patient told to follow up with pcp in 3 days. Come back if worse.    MDM      Final diagnoses:   Bronchitis   Chronic obstructive pulmonary disease with acute exacerbation (CMS/[facility])            Alan Root MD  01/04/19 1752       Alan Root MD  01/04/19 1937

## 2019-01-07 ENCOUNTER — READMISSION MANAGEMENT (OUTPATIENT)
Dept: CALL CENTER | Facility: HOSPITAL | Age: 39
End: 2019-01-07

## 2019-01-07 NOTE — OUTREACH NOTE
Sepsis Week 1 Survey      Responses   Facility patient discharged from?  [place]   Does the patient have one of the following disease processes/diagnoses(primary or secondary)?  Sepsis   Is there a successful TCM telephone encounter documented?  No   Week 1 attempt successful?  No   Unsuccessful attempts  Attempt 2          Isabell Abraham RN

## 2019-01-09 ENCOUNTER — READMISSION MANAGEMENT (OUTPATIENT)
Dept: CALL CENTER | Facility: HOSPITAL | Age: 39
End: 2019-01-09

## 2019-01-09 NOTE — OUTREACH NOTE
Sepsis Week 2 Survey      Responses   Facility patient discharged from?  [place]   Does the patient have one of the following disease processes/diagnoses(primary or secondary)?  Sepsis   Week 2 attempt successful?  No   Unsuccessful attempts  Attempt 1          Ritika Zimmerman RN

## 2019-01-16 ENCOUNTER — READMISSION MANAGEMENT (OUTPATIENT)
Dept: CALL CENTER | Facility: HOSPITAL | Age: 39
End: 2019-01-16

## 2019-01-16 NOTE — OUTREACH NOTE
Sepsis Week 3 Survey      Responses   Facility patient discharged from?  [place]   Does the patient have one of the following disease processes/diagnoses(primary or secondary)?  Sepsis   Week 3 attempt successful?  No   Unsuccessful attempts  Attempt 1          Lou Jose RN

## 2019-01-17 ENCOUNTER — READMISSION MANAGEMENT (OUTPATIENT)
Dept: CALL CENTER | Facility: HOSPITAL | Age: 39
End: 2019-01-17

## 2019-01-17 NOTE — OUTREACH NOTE
Sepsis Week 3 Survey      Responses   Facility patient discharged from?  [facility]   Does the patient have one of the following disease processes/diagnoses(primary or secondary)?  Sepsis   Week 3 attempt successful?  No   Unsuccessful attempts  Attempt 2          Lou Jose RN

## 2019-01-18 ENCOUNTER — EPISODE CHANGES (OUTPATIENT)
Dept: CASE MANAGEMENT | Facility: OTHER | Age: 39
End: 2019-01-18

## 2019-01-18 ENCOUNTER — RESULTS ENCOUNTER (OUTPATIENT)
Dept: BARIATRICS/WEIGHT MGMT | Facility: CLINIC | Age: 39
End: 2019-01-18

## 2019-01-18 ENCOUNTER — PATIENT OUTREACH (OUTPATIENT)
Dept: CASE MANAGEMENT | Facility: OTHER | Age: 39
End: 2019-01-18

## 2019-01-18 DIAGNOSIS — E11.69 DIABETES MELLITUS TYPE 2 IN OBESE (HCC): ICD-10-CM

## 2019-01-18 DIAGNOSIS — E78.2 MIXED HYPERLIPIDEMIA: ICD-10-CM

## 2019-01-18 DIAGNOSIS — I10 UNCONTROLLED HYPERTENSION: ICD-10-CM

## 2019-01-18 DIAGNOSIS — K21.9 GASTROESOPHAGEAL REFLUX DISEASE, ESOPHAGITIS PRESENCE NOT SPECIFIED: ICD-10-CM

## 2019-01-18 DIAGNOSIS — E66.9 DIABETES MELLITUS TYPE 2 IN OBESE (HCC): ICD-10-CM

## 2019-01-18 DIAGNOSIS — E66.01 MORBID OBESITY WITH BMI OF 70 AND OVER, ADULT (HCC): ICD-10-CM

## 2019-01-24 ENCOUNTER — PATIENT OUTREACH (OUTPATIENT)
Dept: CASE MANAGEMENT | Facility: OTHER | Age: 39
End: 2019-01-24

## 2019-01-27 ENCOUNTER — HOSPITAL ENCOUNTER (EMERGENCY)
Facility: HOSPITAL | Age: 39
Discharge: HOME | End: 2019-01-27
Attending: EMERGENCY MEDICINE | Admitting: EMERGENCY MEDICINE

## 2019-01-27 ENCOUNTER — APPOINTMENT (OUTPATIENT)
Dept: GENERAL RADIOLOGY | Facility: HOSPITAL | Age: 39
End: 2019-01-27

## 2019-01-27 ENCOUNTER — APPOINTMENT (OUTPATIENT)
Dept: ULTRASOUND IMAGING | Facility: HOSPITAL | Age: 39
End: 2019-01-27

## 2019-01-27 VITALS
WEIGHT: 315 LBS | HEART RATE: 90 BPM | TEMPERATURE: 97.8 F | OXYGEN SATURATION: 96 % | HEIGHT: 71 IN | BODY MASS INDEX: 44.1 KG/M2 | SYSTOLIC BLOOD PRESSURE: 151 MMHG | DIASTOLIC BLOOD PRESSURE: 68 MMHG | RESPIRATION RATE: 19 BRPM

## 2019-01-27 DIAGNOSIS — R07.9 CHEST PAIN, UNSPECIFIED TYPE: ICD-10-CM

## 2019-01-27 DIAGNOSIS — J40 BRONCHITIS: Primary | ICD-10-CM

## 2019-01-27 DIAGNOSIS — R09.1 PLEURISY: ICD-10-CM

## 2019-01-27 LAB
ALBUMIN SERPL-MCNC: 3.8 G/DL (ref 3.4–4.8)
ALBUMIN/GLOB SERPL: 1.3 G/DL (ref 1.1–1.8)
ALP SERPL-CCNC: 80 U/L (ref 38–126)
ALT SERPL W P-5'-P-CCNC: 32 U/L (ref 21–72)
ANION GAP SERPL CALCULATED.3IONS-SCNC: 6 MMOL/L (ref 5–15)
AST SERPL-CCNC: 38 U/L (ref 17–59)
BASOPHILS # BLD AUTO: 0.02 10*3/MM3 (ref 0–0.2)
BASOPHILS NFR BLD AUTO: 0.3 % (ref 0–2)
BILIRUB SERPL-MCNC: 0.5 MG/DL (ref 0.2–1.3)
BUN BLD-MCNC: 4 MG/DL (ref 7–21)
BUN/CREAT SERPL: 6.6 (ref 7–25)
CALCIUM SPEC-SCNC: 10.3 MG/DL (ref 8.4–10.2)
CHLORIDE SERPL-SCNC: 100 MMOL/L (ref 95–110)
CO2 SERPL-SCNC: 27 MMOL/L (ref 22–31)
CREAT BLD-MCNC: 0.61 MG/DL (ref 0.7–1.3)
DEPRECATED RDW RBC AUTO: 42 FL (ref 35.1–43.9)
EOSINOPHIL # BLD AUTO: 0.51 10*3/MM3 (ref 0–0.7)
EOSINOPHIL NFR BLD AUTO: 7.3 % (ref 0–7)
ERYTHROCYTE [DISTWIDTH] IN BLOOD BY AUTOMATED COUNT: 14.7 % (ref 11.5–14.5)
GFR SERPL CREATININE-BSD FRML MDRD: 146 ML/MIN/1.73 (ref 63–147)
GLOBULIN UR ELPH-MCNC: 2.9 GM/DL (ref 2.3–3.5)
GLUCOSE BLD-MCNC: 285 MG/DL (ref 60–100)
HCT VFR BLD AUTO: 39.4 % (ref 39–49)
HGB BLD-MCNC: 14 G/DL (ref 13.7–17.3)
HOLD SPECIMEN: NORMAL
HOLD SPECIMEN: NORMAL
IMM GRANULOCYTES # BLD AUTO: 0.11 10*3/MM3 (ref 0–0.02)
IMM GRANULOCYTES NFR BLD AUTO: 1.6 % (ref 0–0.5)
LYMPHOCYTES # BLD AUTO: 1.73 10*3/MM3 (ref 0.6–4.2)
LYMPHOCYTES NFR BLD AUTO: 24.9 % (ref 10–50)
MCH RBC QN AUTO: 28.1 PG (ref 26.5–34)
MCHC RBC AUTO-ENTMCNC: 35.5 G/DL (ref 31.5–36.3)
MCV RBC AUTO: 79 FL (ref 80–98)
MONOCYTES # BLD AUTO: 0.68 10*3/MM3 (ref 0–0.9)
MONOCYTES NFR BLD AUTO: 9.8 % (ref 0–12)
NEUTROPHILS # BLD AUTO: 3.89 10*3/MM3 (ref 2–8.6)
NEUTROPHILS NFR BLD AUTO: 56.1 % (ref 37–80)
NT-PROBNP SERPL-MCNC: 37.4 PG/ML (ref 0–450)
PLATELET # BLD AUTO: 173 10*3/MM3 (ref 150–450)
PMV BLD AUTO: 9.1 FL (ref 8–12)
POTASSIUM BLD-SCNC: 3.8 MMOL/L (ref 3.5–5.1)
PROT SERPL-MCNC: 6.7 G/DL (ref 6.3–8.6)
RBC # BLD AUTO: 4.99 10*6/MM3 (ref 4.37–5.74)
SODIUM BLD-SCNC: 133 MMOL/L (ref 137–145)
TROPONIN I SERPL-MCNC: <0.012 NG/ML
TROPONIN I SERPL-MCNC: <0.012 NG/ML
WBC NRBC COR # BLD: 6.94 10*3/MM3 (ref 3.2–9.8)
WHOLE BLOOD HOLD SPECIMEN: NORMAL
WHOLE BLOOD HOLD SPECIMEN: NORMAL

## 2019-01-27 PROCEDURE — 99284 EMERGENCY DEPT VISIT MOD MDM: CPT

## 2019-01-27 PROCEDURE — 71045 X-RAY EXAM CHEST 1 VIEW: CPT

## 2019-01-27 PROCEDURE — 85025 COMPLETE CBC W/AUTO DIFF WBC: CPT | Performed by: EMERGENCY MEDICINE

## 2019-01-27 PROCEDURE — 63710000001 PREDNISONE PER 1 MG: Performed by: EMERGENCY MEDICINE

## 2019-01-27 PROCEDURE — 93005 ELECTROCARDIOGRAM TRACING: CPT | Performed by: EMERGENCY MEDICINE

## 2019-01-27 PROCEDURE — 93010 ELECTROCARDIOGRAM REPORT: CPT | Performed by: INTERNAL MEDICINE

## 2019-01-27 PROCEDURE — 80053 COMPREHEN METABOLIC PANEL: CPT | Performed by: EMERGENCY MEDICINE

## 2019-01-27 PROCEDURE — 83880 ASSAY OF NATRIURETIC PEPTIDE: CPT | Performed by: EMERGENCY MEDICINE

## 2019-01-27 PROCEDURE — 84484 ASSAY OF TROPONIN QUANT: CPT | Performed by: EMERGENCY MEDICINE

## 2019-01-27 PROCEDURE — 93971 EXTREMITY STUDY: CPT

## 2019-01-27 RX ORDER — ASPIRIN 325 MG
325 TABLET ORAL ONCE
Status: COMPLETED | OUTPATIENT
Start: 2019-01-27 | End: 2019-01-27

## 2019-01-27 RX ORDER — ATORVASTATIN CALCIUM 80 MG/1
80 TABLET, FILM COATED ORAL
COMMUNITY
Start: 2018-07-29 | End: 2020-09-29 | Stop reason: SDUPTHER

## 2019-01-27 RX ORDER — PREDNISONE 20 MG/1
20 TABLET ORAL DAILY
Qty: 5 TABLET | Refills: 0 | Status: ON HOLD | OUTPATIENT
Start: 2019-01-27 | End: 2019-04-10

## 2019-01-27 RX ORDER — AZITHROMYCIN 250 MG/1
TABLET, FILM COATED ORAL
Qty: 6 TABLET | Refills: 0 | Status: ON HOLD | OUTPATIENT
Start: 2019-01-27 | End: 2019-04-10

## 2019-01-27 RX ORDER — AZITHROMYCIN 250 MG/1
500 TABLET, FILM COATED ORAL ONCE
Status: COMPLETED | OUTPATIENT
Start: 2019-01-27 | End: 2019-01-27

## 2019-01-27 RX ORDER — PREDNISONE 20 MG/1
20 TABLET ORAL ONCE
Status: COMPLETED | OUTPATIENT
Start: 2019-01-27 | End: 2019-01-27

## 2019-01-27 RX ORDER — SODIUM CHLORIDE 0.9 % (FLUSH) 0.9 %
10 SYRINGE (ML) INJECTION AS NEEDED
Status: DISCONTINUED | OUTPATIENT
Start: 2019-01-27 | End: 2019-01-27 | Stop reason: HOSPADM

## 2019-01-27 RX ADMIN — NITROGLYCERIN 1 INCH: 20 OINTMENT TOPICAL at 13:10

## 2019-01-27 RX ADMIN — ASPIRIN 325 MG: 325 TABLET, COATED ORAL at 11:35

## 2019-01-27 RX ADMIN — AZITHROMYCIN 500 MG: 250 TABLET, FILM COATED ORAL at 15:34

## 2019-01-27 RX ADMIN — PREDNISONE 20 MG: 20 TABLET ORAL at 15:33

## 2019-01-30 ENCOUNTER — PATIENT OUTREACH (OUTPATIENT)
Dept: CASE MANAGEMENT | Facility: OTHER | Age: 39
End: 2019-01-30

## 2019-01-30 NOTE — OUTREACH NOTE
Unable to reach patient/ attempt x 1 following ED visit 1-27-19.  Left voicemail with Care Advisor contact information.

## 2019-02-04 ENCOUNTER — HOSPITAL ENCOUNTER (EMERGENCY)
Facility: HOSPITAL | Age: 39
Discharge: HOME | End: 2019-02-04
Attending: FAMILY MEDICINE | Admitting: FAMILY MEDICINE

## 2019-02-04 ENCOUNTER — PATIENT OUTREACH (OUTPATIENT)
Dept: CASE MANAGEMENT | Facility: OTHER | Age: 39
End: 2019-02-04

## 2019-02-04 VITALS
BODY MASS INDEX: 44.1 KG/M2 | OXYGEN SATURATION: 97 % | HEART RATE: 94 BPM | RESPIRATION RATE: 22 BRPM | WEIGHT: 315 LBS | DIASTOLIC BLOOD PRESSURE: 87 MMHG | SYSTOLIC BLOOD PRESSURE: 145 MMHG | TEMPERATURE: 97.6 F | HEIGHT: 71 IN

## 2019-02-04 DIAGNOSIS — L03.116 CELLULITIS OF LEFT LOWER EXTREMITY: ICD-10-CM

## 2019-02-04 DIAGNOSIS — B37.2 CANDIDAL INTERTRIGO: Primary | ICD-10-CM

## 2019-02-04 PROCEDURE — 99283 EMERGENCY DEPT VISIT LOW MDM: CPT

## 2019-02-04 RX ORDER — ONDANSETRON 4 MG/1
8 TABLET, ORALLY DISINTEGRATING ORAL ONCE
Status: COMPLETED | OUTPATIENT
Start: 2019-02-04 | End: 2019-02-04

## 2019-02-04 RX ORDER — ONDANSETRON 4 MG/1
4 TABLET, ORALLY DISINTEGRATING ORAL EVERY 6 HOURS PRN
Qty: 10 TABLET | Refills: 0 | Status: SHIPPED | OUTPATIENT
Start: 2019-02-04 | End: 2019-05-14

## 2019-02-04 RX ORDER — NYSTATIN 100000 [USP'U]/G
POWDER TOPICAL 2 TIMES DAILY
Qty: 30 G | Refills: 0 | Status: SHIPPED | OUTPATIENT
Start: 2019-02-04 | End: 2019-05-14

## 2019-02-04 RX ORDER — CEPHALEXIN 500 MG/1
500 CAPSULE ORAL 4 TIMES DAILY
Qty: 28 CAPSULE | Refills: 0 | Status: ON HOLD | OUTPATIENT
Start: 2019-02-04 | End: 2019-04-10

## 2019-02-04 RX ORDER — FLUCONAZOLE 150 MG/1
150 TABLET ORAL ONCE
Qty: 1 TABLET | Refills: 0 | Status: SHIPPED | OUTPATIENT
Start: 2019-02-04 | End: 2019-02-04

## 2019-02-04 RX ADMIN — ONDANSETRON 8 MG: 4 TABLET, ORALLY DISINTEGRATING ORAL at 09:29

## 2019-02-04 NOTE — ED PROVIDER NOTES
Subjective     Nausea   The primary symptoms include fatigue, nausea and rash. Primary symptoms do not include fever, abdominal pain, vomiting, diarrhea, dysuria, myalgias or arthralgias. The illness began 3 to 5 days ago.   The illness does not include chills or constipation. Significant associated medical issues include gallstones. Associated medical issues do not include gastric bypass, bowel resection or irritable bowel syndrome.   Fatigue   Associated symptoms: fatigue, nausea and rash    Associated symptoms: no abdominal pain, no chest pain, no congestion, no cough, no diarrhea, no ear pain, no fever, no headaches, no myalgias, no rhinorrhea, no shortness of breath, no sore throat, no vomiting and no wheezing    Rash   Associated symptoms: fatigue and nausea    Associated symptoms: no abdominal pain, no diarrhea, no fever, no headaches, no joint pain, no myalgias, no shortness of breath, no sore throat, not vomiting and not wheezing        Review of Systems   Constitutional: Positive for fatigue. Negative for appetite change, chills, diaphoresis and fever.   HENT: Negative for congestion, ear discharge, ear pain, nosebleeds, rhinorrhea, sinus pressure, sore throat and trouble swallowing.    Eyes: Negative for discharge and redness.   Respiratory: Negative for apnea, cough, chest tightness, shortness of breath and wheezing.    Cardiovascular: Negative for chest pain.   Gastrointestinal: Positive for nausea. Negative for abdominal pain, constipation, diarrhea and vomiting.   Endocrine: Negative for polyuria.   Genitourinary: Negative for dysuria, frequency and urgency.   Musculoskeletal: Negative for arthralgias, myalgias and neck pain.   Skin: Positive for color change and rash.   Allergic/Immunologic: Negative for immunocompromised state.   Neurological: Negative for dizziness, seizures, syncope, weakness, light-headedness and headaches.   Hematological: Negative for adenopathy. Does not bruise/bleed easily.    Psychiatric/Behavioral: Negative for behavioral problems and confusion.   All other systems reviewed and are negative.      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[facility])    • Fatty liver    • GERD (gastroesophageal reflux disease)    • Hyperlipidemia    • Hypertension    • Morbid obesity (CMS/[facility])    • Pulmonary embolism (CMS/[facility])    • RLS (restless legs syndrome)    • Seizures (CMS/[facility])     last one many years ago   • Sleep apnea     c-pap       Allergies   Allergen Reactions   • Glipizide Other (See Comments) and Unknown (See Comments)     Slurred Speech  Slurred Speech  Hallucinations, Slurred Speech   • Metoclopramide Anxiety   • Tramadol Other (See Comments)     seizures   • Risperidone Other (See Comments)     Slurred speech  Can't stand, trouble breathing, slurred speech         Past Surgical History:   Procedure Laterality Date   • ADENOIDECTOMY     • CARDIAC CATHETERIZATION N/A 3/15/2017    Procedure: Left Heart Cath;  Surgeon: Edwige Briceno MD;  Location: [facility] INVASIVE LOCATION;  Service:    • CARDIAC CATHETERIZATION N/A 3/15/2017    Procedure: Stent SOPHIA coronary;  Surgeon: Edwige Briceno MD;  Location: [facility] INVASIVE LOCATION;  Service:    • CARDIAC CATHETERIZATION N/A 3/1/2018    Procedure: Left Heart Cath;  Surgeon: Conor Parsons MD;  Location: [facility] INVASIVE LOCATION;  Service:    • CHOLECYSTECTOMY      lap   • CORONARY ANGIOPLASTY WITH STENT PLACEMENT     • NM RT/LT HEART CATHETERS N/A 3/15/2017    Procedure: Percutaneous Coronary Intervention;  Surgeon: Edwige Briceno MD;  Location: [facility] INVASIVE LOCATION;  Service: Cardiovascular   • TONSILLECTOMY     • TRANSESOPHAGEAL ECHOCARDIOGRAM (MONICA)         Family History   Problem Relation Age of Onset   • Heart disease Mother    • Hypertension Mother    •  Hyperlipidemia Mother    • Coronary artery disease Mother    • Diabetes Mother    • Obesity Mother    • Kidney failure Mother    • Sleep apnea Father    • Cancer Paternal Grandfather        Social History     Socioeconomic History   • Marital status:      Spouse name: Cori   • Number of children: 0   • Years of education: Not on file   • Highest education level: Not on file   Occupational History   • Occupation: Disabled   Tobacco Use   • Smoking status: Former Smoker     Packs/day: 0.25     Years: 0.50     Pack years: 0.12     Types: Cigarettes     Last attempt to quit:      Years since quittin.1   • Smokeless tobacco: Current User     Types: Snuff   • Tobacco comment: quit smoking 25 years ago; he does use snuff   Substance and Sexual Activity   • Alcohol use: No   • Drug use: No   • Sexual activity: Defer           Objective   Physical Exam   Constitutional: He is oriented to person, place, and time. He appears well-developed and well-nourished.   HENT:   Head: Normocephalic and atraumatic.   Nose: Nose normal.   Mouth/Throat: Oropharynx is clear and moist.   Eyes: Conjunctivae and EOM are normal. Pupils are equal, round, and reactive to light. Right eye exhibits no discharge. Left eye exhibits no discharge. No scleral icterus.   Neck: Normal range of motion. Neck supple. No tracheal deviation present.   Cardiovascular: Normal rate, regular rhythm and normal heart sounds.   No murmur heard.  Pulmonary/Chest: Effort normal and breath sounds normal. No stridor. No respiratory distress. He has no wheezes. He has no rales.   Abdominal: Soft. Bowel sounds are normal. He exhibits no distension and no mass. There is no tenderness. There is no rebound and no guarding.   Musculoskeletal: He exhibits no edema.   Neurological: He is alert and oriented to person, place, and time. Coordination normal.   Skin: Skin is warm and dry. No rash noted. There is erythema.        Large area of erythema over left  axilla.    Psychiatric: He has a normal mood and affect. His behavior is normal. Thought content normal.   Nursing note and vitals reviewed.      Procedures           ED Course                  MDM      Final diagnoses:   Candidal intertrigo   Cellulitis of left lower extremity            Red Loya MD  02/04/19 0946

## 2019-02-14 ENCOUNTER — PATIENT OUTREACH (OUTPATIENT)
Dept: CASE MANAGEMENT | Facility: OTHER | Age: 39
End: 2019-02-14

## 2019-02-15 ENCOUNTER — RESULTS ENCOUNTER (OUTPATIENT)
Dept: BARIATRICS/WEIGHT MGMT | Facility: CLINIC | Age: 39
End: 2019-02-15

## 2019-02-15 DIAGNOSIS — E11.69 DIABETES MELLITUS TYPE 2 IN OBESE (HCC): ICD-10-CM

## 2019-02-15 DIAGNOSIS — I10 UNCONTROLLED HYPERTENSION: ICD-10-CM

## 2019-02-15 DIAGNOSIS — E78.2 MIXED HYPERLIPIDEMIA: ICD-10-CM

## 2019-02-15 DIAGNOSIS — E66.01 MORBID OBESITY WITH BMI OF 70 AND OVER, ADULT (HCC): ICD-10-CM

## 2019-02-15 DIAGNOSIS — E66.9 DIABETES MELLITUS TYPE 2 IN OBESE (HCC): ICD-10-CM

## 2019-02-15 DIAGNOSIS — K21.9 GASTROESOPHAGEAL REFLUX DISEASE, ESOPHAGITIS PRESENCE NOT SPECIFIED: ICD-10-CM

## 2019-02-20 ENCOUNTER — APPOINTMENT (OUTPATIENT)
Dept: CT IMAGING | Facility: HOSPITAL | Age: 39
End: 2019-02-20

## 2019-02-20 ENCOUNTER — HOSPITAL ENCOUNTER (OUTPATIENT)
Facility: HOSPITAL | Age: 39
Setting detail: OBSERVATION
Discharge: HOME | End: 2019-02-22
Attending: EMERGENCY MEDICINE | Admitting: EMERGENCY MEDICINE

## 2019-02-20 ENCOUNTER — APPOINTMENT (OUTPATIENT)
Dept: GENERAL RADIOLOGY | Facility: HOSPITAL | Age: 39
End: 2019-02-20

## 2019-02-20 ENCOUNTER — APPOINTMENT (OUTPATIENT)
Dept: ULTRASOUND IMAGING | Facility: HOSPITAL | Age: 39
End: 2019-02-20

## 2019-02-20 DIAGNOSIS — R07.9 CHEST PAIN, RULE OUT ACUTE MYOCARDIAL INFARCTION: Primary | ICD-10-CM

## 2019-02-20 PROBLEM — E11.9 TYPE 2 DIABETES MELLITUS WITHOUT COMPLICATION, WITH LONG-TERM CURRENT USE OF INSULIN: Status: ACTIVE | Noted: 2018-03-30

## 2019-02-20 PROBLEM — K85.00 IDIOPATHIC ACUTE PANCREATITIS: Status: ACTIVE | Noted: 2019-02-20

## 2019-02-20 PROBLEM — Z79.4 TYPE 2 DIABETES MELLITUS WITHOUT COMPLICATION, WITH LONG-TERM CURRENT USE OF INSULIN (HCC): Status: ACTIVE | Noted: 2018-03-30

## 2019-02-20 LAB
ALBUMIN SERPL-MCNC: 3.8 G/DL (ref 3.4–4.8)
ALBUMIN/GLOB SERPL: 1.2 G/DL (ref 1.1–1.8)
ALP SERPL-CCNC: 65 U/L (ref 38–126)
ALT SERPL W P-5'-P-CCNC: 21 U/L (ref 21–72)
AMYLASE SERPL-CCNC: 104 U/L (ref 50–130)
ANION GAP SERPL CALCULATED.3IONS-SCNC: 7 MMOL/L (ref 5–15)
AST SERPL-CCNC: 30 U/L (ref 17–59)
BASOPHILS # BLD AUTO: 0.04 10*3/MM3 (ref 0–0.2)
BASOPHILS NFR BLD AUTO: 0.5 % (ref 0–1.5)
BILIRUB SERPL-MCNC: 0.4 MG/DL (ref 0.2–1.3)
BUN BLD-MCNC: 7 MG/DL (ref 7–21)
BUN/CREAT SERPL: 13.7 (ref 7–25)
CALCIUM SPEC-SCNC: 8.9 MG/DL (ref 8.4–10.2)
CHLORIDE SERPL-SCNC: 97 MMOL/L (ref 95–110)
CO2 SERPL-SCNC: 27 MMOL/L (ref 22–31)
CREAT BLD-MCNC: 0.51 MG/DL (ref 0.7–1.3)
D-DIMER, QUANTITATIVE (MAD,POW, STR): 679 NG/ML (FEU) (ref 0–470)
DEPRECATED RDW RBC AUTO: 42.7 FL (ref 37–54)
EOSINOPHIL # BLD AUTO: 0.28 10*3/MM3 (ref 0–0.4)
EOSINOPHIL NFR BLD AUTO: 3.3 % (ref 0.3–6.2)
ERYTHROCYTE [DISTWIDTH] IN BLOOD BY AUTOMATED COUNT: 14.6 % (ref 12.3–15.4)
GFR SERPL CREATININE-BSD FRML MDRD: 180 ML/MIN/1.73 (ref 63–147)
GLOBULIN UR ELPH-MCNC: 3.2 GM/DL (ref 2.3–3.5)
GLUCOSE BLD-MCNC: 238 MG/DL (ref 60–100)
GLUCOSE BLDC GLUCOMTR-MCNC: 217 MG/DL (ref 70–130)
GLUCOSE BLDC GLUCOMTR-MCNC: 265 MG/DL (ref 70–130)
HCT VFR BLD AUTO: 39.4 % (ref 37.5–51)
HGB BLD-MCNC: 12.9 G/DL (ref 13–17.7)
HOLD SPECIMEN: NORMAL
IMM GRANULOCYTES # BLD AUTO: 0.1 10*3/MM3 (ref 0–0.05)
IMM GRANULOCYTES NFR BLD AUTO: 1.2 % (ref 0–0.5)
INR PPP: 0.97 (ref 0.8–1.2)
LIPASE SERPL-CCNC: 1115 U/L (ref 23–300)
LYMPHOCYTES # BLD AUTO: 1.54 10*3/MM3 (ref 0.7–3.1)
LYMPHOCYTES NFR BLD AUTO: 18.4 % (ref 19.6–45.3)
MCH RBC QN AUTO: 26.7 PG (ref 26.6–33)
MCHC RBC AUTO-ENTMCNC: 32.7 G/DL (ref 31.5–35.7)
MCV RBC AUTO: 81.6 FL (ref 79–97)
MONOCYTES # BLD AUTO: 0.71 10*3/MM3 (ref 0.1–0.9)
MONOCYTES NFR BLD AUTO: 8.5 % (ref 5–12)
NEUTROPHILS # BLD AUTO: 5.69 10*3/MM3 (ref 1.4–7)
NEUTROPHILS NFR BLD AUTO: 68.1 % (ref 42.7–76)
NRBC BLD AUTO-RTO: 0 /100 WBC (ref 0–0)
NT-PROBNP SERPL-MCNC: 37.2 PG/ML (ref 0–450)
PLATELET # BLD AUTO: 204 10*3/MM3 (ref 140–450)
PMV BLD AUTO: 9.2 FL (ref 6–12)
POTASSIUM BLD-SCNC: 3.9 MMOL/L (ref 3.5–5.1)
PROT SERPL-MCNC: 7 G/DL (ref 6.3–8.6)
PROTHROMBIN TIME: 12.7 SECONDS (ref 11.1–15.3)
RBC # BLD AUTO: 4.83 10*6/MM3 (ref 4.14–5.8)
SODIUM BLD-SCNC: 131 MMOL/L (ref 137–145)
TROPONIN I SERPL-MCNC: <0.012 NG/ML
TROPONIN I SERPL-MCNC: <0.012 NG/ML
WBC NRBC COR # BLD: 8.36 10*3/MM3 (ref 3.4–10.8)
WHOLE BLOOD HOLD SPECIMEN: NORMAL
WHOLE BLOOD HOLD SPECIMEN: NORMAL

## 2019-02-20 PROCEDURE — G0378 HOSPITAL OBSERVATION PER HR: HCPCS

## 2019-02-20 PROCEDURE — 36415 COLL VENOUS BLD VENIPUNCTURE: CPT | Performed by: EMERGENCY MEDICINE

## 2019-02-20 PROCEDURE — 74177 CT ABD & PELVIS W/CONTRAST: CPT

## 2019-02-20 PROCEDURE — 94640 AIRWAY INHALATION TREATMENT: CPT

## 2019-02-20 PROCEDURE — 82962 GLUCOSE BLOOD TEST: CPT

## 2019-02-20 PROCEDURE — 93970 EXTREMITY STUDY: CPT

## 2019-02-20 PROCEDURE — 93010 ELECTROCARDIOGRAM REPORT: CPT | Performed by: INTERNAL MEDICINE

## 2019-02-20 PROCEDURE — 84484 ASSAY OF TROPONIN QUANT: CPT | Performed by: EMERGENCY MEDICINE

## 2019-02-20 PROCEDURE — 63710000001 INSULIN DETEMIR PER 5 UNITS: Performed by: STUDENT IN AN ORGANIZED HEALTH CARE EDUCATION/TRAINING PROGRAM

## 2019-02-20 PROCEDURE — 25010000002 MORPHINE PER 10 MG: Performed by: EMERGENCY MEDICINE

## 2019-02-20 PROCEDURE — 85025 COMPLETE CBC W/AUTO DIFF WBC: CPT | Performed by: EMERGENCY MEDICINE

## 2019-02-20 PROCEDURE — 99285 EMERGENCY DEPT VISIT HI MDM: CPT

## 2019-02-20 PROCEDURE — 83690 ASSAY OF LIPASE: CPT | Performed by: STUDENT IN AN ORGANIZED HEALTH CARE EDUCATION/TRAINING PROGRAM

## 2019-02-20 PROCEDURE — 93005 ELECTROCARDIOGRAM TRACING: CPT | Performed by: EMERGENCY MEDICINE

## 2019-02-20 PROCEDURE — 63710000001 PREDNISONE PER 1 MG: Performed by: STUDENT IN AN ORGANIZED HEALTH CARE EDUCATION/TRAINING PROGRAM

## 2019-02-20 PROCEDURE — 96374 THER/PROPH/DIAG INJ IV PUSH: CPT

## 2019-02-20 PROCEDURE — 71275 CT ANGIOGRAPHY CHEST: CPT

## 2019-02-20 PROCEDURE — 94760 N-INVAS EAR/PLS OXIMETRY 1: CPT

## 2019-02-20 PROCEDURE — 85610 PROTHROMBIN TIME: CPT | Performed by: EMERGENCY MEDICINE

## 2019-02-20 PROCEDURE — 71045 X-RAY EXAM CHEST 1 VIEW: CPT

## 2019-02-20 PROCEDURE — 85379 FIBRIN DEGRADATION QUANT: CPT | Performed by: EMERGENCY MEDICINE

## 2019-02-20 PROCEDURE — 80053 COMPREHEN METABOLIC PANEL: CPT | Performed by: EMERGENCY MEDICINE

## 2019-02-20 PROCEDURE — 83880 ASSAY OF NATRIURETIC PEPTIDE: CPT | Performed by: EMERGENCY MEDICINE

## 2019-02-20 PROCEDURE — 82150 ASSAY OF AMYLASE: CPT | Performed by: STUDENT IN AN ORGANIZED HEALTH CARE EDUCATION/TRAINING PROGRAM

## 2019-02-20 PROCEDURE — 25010000002 ONDANSETRON PER 1 MG: Performed by: EMERGENCY MEDICINE

## 2019-02-20 PROCEDURE — 96375 TX/PRO/DX INJ NEW DRUG ADDON: CPT

## 2019-02-20 PROCEDURE — 63710000001 INSULIN ASPART PER 5 UNITS: Performed by: STUDENT IN AN ORGANIZED HEALTH CARE EDUCATION/TRAINING PROGRAM

## 2019-02-20 PROCEDURE — 94799 UNLISTED PULMONARY SVC/PX: CPT

## 2019-02-20 PROCEDURE — 0 IOPAMIDOL PER 1 ML: Performed by: STUDENT IN AN ORGANIZED HEALTH CARE EDUCATION/TRAINING PROGRAM

## 2019-02-20 RX ORDER — PANTOPRAZOLE SODIUM 40 MG/1
40 TABLET, DELAYED RELEASE ORAL NIGHTLY
Status: DISCONTINUED | OUTPATIENT
Start: 2019-02-20 | End: 2019-02-22 | Stop reason: HOSPADM

## 2019-02-20 RX ORDER — FENOFIBRATE 145 MG/1
145 TABLET, COATED ORAL DAILY
Status: DISCONTINUED | OUTPATIENT
Start: 2019-02-20 | End: 2019-02-22 | Stop reason: HOSPADM

## 2019-02-20 RX ORDER — ASPIRIN 81 MG/1
324 TABLET, CHEWABLE ORAL ONCE
Status: DISCONTINUED | OUTPATIENT
Start: 2019-02-20 | End: 2019-02-20

## 2019-02-20 RX ORDER — ONDANSETRON 4 MG/1
4 TABLET, ORALLY DISINTEGRATING ORAL EVERY 6 HOURS PRN
Status: DISCONTINUED | OUTPATIENT
Start: 2019-02-20 | End: 2019-02-22 | Stop reason: HOSPADM

## 2019-02-20 RX ORDER — AMLODIPINE BESYLATE 10 MG/1
10 TABLET ORAL NIGHTLY
Status: DISCONTINUED | OUTPATIENT
Start: 2019-02-20 | End: 2019-02-22 | Stop reason: HOSPADM

## 2019-02-20 RX ORDER — FENOFIBRATE 145 MG/1
145 TABLET, COATED ORAL DAILY
COMMUNITY
End: 2020-09-29

## 2019-02-20 RX ORDER — PRAMIPEXOLE DIHYDROCHLORIDE 1 MG/1
1 TABLET ORAL NIGHTLY
Status: DISCONTINUED | OUTPATIENT
Start: 2019-02-20 | End: 2019-02-22 | Stop reason: HOSPADM

## 2019-02-20 RX ORDER — ONDANSETRON 4 MG/1
4 TABLET, FILM COATED ORAL EVERY 6 HOURS PRN
Status: DISCONTINUED | OUTPATIENT
Start: 2019-02-20 | End: 2019-02-20 | Stop reason: SDUPTHER

## 2019-02-20 RX ORDER — PREDNISONE 20 MG/1
20 TABLET ORAL
Status: DISCONTINUED | OUTPATIENT
Start: 2019-02-20 | End: 2019-02-22 | Stop reason: HOSPADM

## 2019-02-20 RX ORDER — ATORVASTATIN CALCIUM 40 MG/1
80 TABLET, FILM COATED ORAL DAILY
Status: DISCONTINUED | OUTPATIENT
Start: 2019-02-20 | End: 2019-02-22 | Stop reason: HOSPADM

## 2019-02-20 RX ORDER — BUDESONIDE AND FORMOTEROL FUMARATE DIHYDRATE 160; 4.5 UG/1; UG/1
2 AEROSOL RESPIRATORY (INHALATION)
Status: DISCONTINUED | OUTPATIENT
Start: 2019-02-20 | End: 2019-02-22 | Stop reason: HOSPADM

## 2019-02-20 RX ORDER — ISOSORBIDE MONONITRATE 60 MG/1
120 TABLET, EXTENDED RELEASE ORAL NIGHTLY
Status: DISCONTINUED | OUTPATIENT
Start: 2019-02-20 | End: 2019-02-22 | Stop reason: HOSPADM

## 2019-02-20 RX ORDER — ACETAMINOPHEN 325 MG/1
650 TABLET ORAL EVERY 4 HOURS PRN
Status: DISCONTINUED | OUTPATIENT
Start: 2019-02-20 | End: 2019-02-22 | Stop reason: HOSPADM

## 2019-02-20 RX ORDER — SODIUM CHLORIDE 0.9 % (FLUSH) 0.9 %
3-10 SYRINGE (ML) INJECTION AS NEEDED
Status: DISCONTINUED | OUTPATIENT
Start: 2019-02-20 | End: 2019-02-22 | Stop reason: HOSPADM

## 2019-02-20 RX ORDER — SERTRALINE HYDROCHLORIDE 25 MG/1
25 TABLET, FILM COATED ORAL NIGHTLY
Status: DISCONTINUED | OUTPATIENT
Start: 2019-02-20 | End: 2019-02-22 | Stop reason: HOSPADM

## 2019-02-20 RX ORDER — FLUTICASONE PROPIONATE 50 MCG
2 SPRAY, SUSPENSION (ML) NASAL DAILY
Status: DISCONTINUED | OUTPATIENT
Start: 2019-02-20 | End: 2019-02-22 | Stop reason: HOSPADM

## 2019-02-20 RX ORDER — IPRATROPIUM BROMIDE AND ALBUTEROL SULFATE 2.5; .5 MG/3ML; MG/3ML
3 SOLUTION RESPIRATORY (INHALATION) ONCE
Status: COMPLETED | OUTPATIENT
Start: 2019-02-20 | End: 2019-02-20

## 2019-02-20 RX ORDER — SODIUM CHLORIDE 0.9 % (FLUSH) 0.9 %
10 SYRINGE (ML) INJECTION AS NEEDED
Status: DISCONTINUED | OUTPATIENT
Start: 2019-02-20 | End: 2019-02-22 | Stop reason: HOSPADM

## 2019-02-20 RX ORDER — ALBUTEROL SULFATE 2.5 MG/3ML
1.25 SOLUTION RESPIRATORY (INHALATION) EVERY 6 HOURS PRN
Status: DISCONTINUED | OUTPATIENT
Start: 2019-02-20 | End: 2019-02-22 | Stop reason: HOSPADM

## 2019-02-20 RX ORDER — DEXTROSE MONOHYDRATE 25 G/50ML
25 INJECTION, SOLUTION INTRAVENOUS
Status: DISCONTINUED | OUTPATIENT
Start: 2019-02-20 | End: 2019-02-22 | Stop reason: HOSPADM

## 2019-02-20 RX ORDER — RANOLAZINE 500 MG/1
1000 TABLET, EXTENDED RELEASE ORAL EVERY 12 HOURS SCHEDULED
Status: DISCONTINUED | OUTPATIENT
Start: 2019-02-20 | End: 2019-02-22 | Stop reason: HOSPADM

## 2019-02-20 RX ORDER — PRASUGREL 10 MG/1
10 TABLET, FILM COATED ORAL DAILY
Status: DISCONTINUED | OUTPATIENT
Start: 2019-02-20 | End: 2019-02-22 | Stop reason: HOSPADM

## 2019-02-20 RX ORDER — NICOTINE POLACRILEX 4 MG
15 LOZENGE BUCCAL
Status: DISCONTINUED | OUTPATIENT
Start: 2019-02-20 | End: 2019-02-22 | Stop reason: HOSPADM

## 2019-02-20 RX ORDER — NYSTATIN 100000 [USP'U]/G
POWDER TOPICAL 2 TIMES DAILY
Status: DISCONTINUED | OUTPATIENT
Start: 2019-02-20 | End: 2019-02-22 | Stop reason: HOSPADM

## 2019-02-20 RX ORDER — ONDANSETRON 2 MG/ML
4 INJECTION INTRAMUSCULAR; INTRAVENOUS ONCE
Status: COMPLETED | OUTPATIENT
Start: 2019-02-20 | End: 2019-02-20

## 2019-02-20 RX ORDER — TRAZODONE HYDROCHLORIDE 150 MG/1
300 TABLET ORAL NIGHTLY
Status: DISCONTINUED | OUTPATIENT
Start: 2019-02-20 | End: 2019-02-22 | Stop reason: HOSPADM

## 2019-02-20 RX ORDER — LISINOPRIL 40 MG/1
40 TABLET ORAL NIGHTLY
Status: DISCONTINUED | OUTPATIENT
Start: 2019-02-20 | End: 2019-02-22 | Stop reason: HOSPADM

## 2019-02-20 RX ORDER — SODIUM CHLORIDE 0.9 % (FLUSH) 0.9 %
3 SYRINGE (ML) INJECTION EVERY 12 HOURS SCHEDULED
Status: DISCONTINUED | OUTPATIENT
Start: 2019-02-20 | End: 2019-02-22 | Stop reason: HOSPADM

## 2019-02-20 RX ORDER — NEBIVOLOL 5 MG/1
5 TABLET ORAL
Status: DISCONTINUED | OUTPATIENT
Start: 2019-02-20 | End: 2019-02-22 | Stop reason: HOSPADM

## 2019-02-20 RX ORDER — NITROGLYCERIN 0.4 MG/1
0.4 TABLET SUBLINGUAL
Status: DISCONTINUED | OUTPATIENT
Start: 2019-02-20 | End: 2019-02-22 | Stop reason: HOSPADM

## 2019-02-20 RX ADMIN — BUDESONIDE AND FORMOTEROL FUMARATE DIHYDRATE 2 PUFF: 160; 4.5 AEROSOL RESPIRATORY (INHALATION) at 19:17

## 2019-02-20 RX ADMIN — PRAMIPEXOLE DIHYDROCHLORIDE 1 MG: 1 TABLET ORAL at 20:44

## 2019-02-20 RX ADMIN — SERTRALINE HYDROCHLORIDE 25 MG: 25 TABLET ORAL at 20:43

## 2019-02-20 RX ADMIN — PRASUGREL HYDROCHLORIDE 10 MG: 10 TABLET, FILM COATED ORAL at 12:00

## 2019-02-20 RX ADMIN — LISINOPRIL 40 MG: 40 TABLET ORAL at 20:43

## 2019-02-20 RX ADMIN — FENOFIBRATE 145 MG: 145 TABLET ORAL at 12:00

## 2019-02-20 RX ADMIN — INSULIN ASPART 30 UNITS: 100 INJECTION, SOLUTION INTRAVENOUS; SUBCUTANEOUS at 12:02

## 2019-02-20 RX ADMIN — INSULIN DETEMIR 75 UNITS: 100 INJECTION, SOLUTION SUBCUTANEOUS at 12:32

## 2019-02-20 RX ADMIN — INSULIN ASPART 30 UNITS: 100 INJECTION, SOLUTION INTRAVENOUS; SUBCUTANEOUS at 16:47

## 2019-02-20 RX ADMIN — NEBIVOLOL HYDROCHLORIDE 5 MG: 5 TABLET ORAL at 12:01

## 2019-02-20 RX ADMIN — SODIUM CHLORIDE, PRESERVATIVE FREE 3 ML: 5 INJECTION INTRAVENOUS at 12:04

## 2019-02-20 RX ADMIN — INSULIN ASPART 4 UNITS: 100 INJECTION, SOLUTION INTRAVENOUS; SUBCUTANEOUS at 12:03

## 2019-02-20 RX ADMIN — ATORVASTATIN CALCIUM 80 MG: 40 TABLET, FILM COATED ORAL at 12:00

## 2019-02-20 RX ADMIN — ISOSORBIDE MONONITRATE 120 MG: 60 TABLET, EXTENDED RELEASE ORAL at 20:44

## 2019-02-20 RX ADMIN — IOPAMIDOL 100 ML: 755 INJECTION, SOLUTION INTRAVENOUS at 15:14

## 2019-02-20 RX ADMIN — PREDNISONE 20 MG: 20 TABLET ORAL at 12:32

## 2019-02-20 RX ADMIN — MORPHINE SULFATE 4 MG: 4 INJECTION INTRAVENOUS at 07:56

## 2019-02-20 RX ADMIN — SODIUM CHLORIDE, PRESERVATIVE FREE 3 ML: 5 INJECTION INTRAVENOUS at 20:44

## 2019-02-20 RX ADMIN — RIVAROXABAN 20 MG: 10 TABLET, FILM COATED ORAL at 16:48

## 2019-02-20 RX ADMIN — AMLODIPINE BESYLATE 10 MG: 10 TABLET ORAL at 20:43

## 2019-02-20 RX ADMIN — INSULIN ASPART 6 UNITS: 100 INJECTION, SOLUTION INTRAVENOUS; SUBCUTANEOUS at 20:44

## 2019-02-20 RX ADMIN — ACETAMINOPHEN 650 MG: 325 TABLET, FILM COATED ORAL at 15:45

## 2019-02-20 RX ADMIN — NYSTATIN: 100000 POWDER TOPICAL at 12:01

## 2019-02-20 RX ADMIN — PANTOPRAZOLE SODIUM 40 MG: 40 TABLET, DELAYED RELEASE ORAL at 20:43

## 2019-02-20 RX ADMIN — TRAZODONE HYDROCHLORIDE 300 MG: 150 TABLET ORAL at 20:43

## 2019-02-20 RX ADMIN — ONDANSETRON 4 MG: 4 TABLET, ORALLY DISINTEGRATING ORAL at 15:45

## 2019-02-20 RX ADMIN — NITROGLYCERIN 1 INCH: 20 OINTMENT TOPICAL at 07:57

## 2019-02-20 RX ADMIN — FLUTICASONE PROPIONATE 2 SPRAY: 50 SPRAY, METERED NASAL at 12:02

## 2019-02-20 RX ADMIN — IPRATROPIUM BROMIDE AND ALBUTEROL SULFATE 3 ML: .5; 3 SOLUTION RESPIRATORY (INHALATION) at 08:06

## 2019-02-20 RX ADMIN — RANOLAZINE 1000 MG: 500 TABLET, FILM COATED, EXTENDED RELEASE ORAL at 12:01

## 2019-02-20 RX ADMIN — RANOLAZINE 1000 MG: 500 TABLET, FILM COATED, EXTENDED RELEASE ORAL at 20:43

## 2019-02-20 RX ADMIN — INSULIN ASPART 6 UNITS: 100 INJECTION, SOLUTION INTRAVENOUS; SUBCUTANEOUS at 16:48

## 2019-02-20 RX ADMIN — ONDANSETRON 4 MG: 2 INJECTION INTRAMUSCULAR; INTRAVENOUS at 07:56

## 2019-02-20 NOTE — H&P
HISTORY AND PHYSICAL  NAME: Yordy Hansen  : 1980  MRN: [id number]    DATE OF ADMISSION: 19    DATE & TIME SEEN: 19 1:40 PM    PCP: Ruslan Gonzalez APRN    CODE STATUS:   Code Status and Medical Interventions:   Ordered at: 19 1126     Level Of Support Discussed With:    Patient     Code Status:    CPR     Medical Interventions (Level of Support Prior to Arrest):    Full       CHIEF COMPLAINT chest pain    HPI:  Yordy Hansen is a 38 y.o. male who presents with one day of chest pain. Located substernally, had been radiating to L shoulder. Describes it as smothering and sharp. Pain is currently 7/10. Associated with nausea.  Patient with history of recent travel to [place] and [facility].  Travel occurred amidst chest pain.    Patient has associated nausea, also has been recently diagnosed with hypertriglyceridemia and placed on fenofibrate in addition to statin therapy.    Patient running out of home medications with only trazodone, medication for restless leg syndrome and new fenofibrate as only active meds he was currently taking prior to admission.    In emergency department patient treated and evaluated for typical chest pain/unstable angina BENITO therapy resolved chest pain.  No significant EKG changes and troponins negative x1.  Presentation and CT angiogram with profound history of multiple CT angiograms in the past.  Patient was admitted to floor for further observation and treatment.    CONCURRENT MEDICAL HISTORY:  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HCC)    • Fatty liver    • GERD (gastroesophageal reflux disease)    • Hyperlipidemia    • Hypertension    • Morbid obesity (CMS/HCC)    • Pulmonary embolism (CMS/HCC)    • RLS (restless legs syndrome)    • Seizures (CMS/HCC)     last one many years ago   • Sleep  apnea     c-pap       PAST SURGICAL HISTORY:  Past Surgical History:   Procedure Laterality Date   • ADENOIDECTOMY     • CARDIAC CATHETERIZATION N/A 3/15/2017    Procedure: Left Heart Cath;  Surgeon: Edwige Briceno MD;  Location: [facility] CATH INVASIVE LOCATION;  Service:    • CARDIAC CATHETERIZATION N/A 3/15/2017    Procedure: Stent SOPHIA coronary;  Surgeon: Edwige Briceno MD;  Location: [facility] CATH INVASIVE LOCATION;  Service:    • CARDIAC CATHETERIZATION N/A 3/1/2018    Procedure: Left Heart Cath;  Surgeon: Conor Parsons MD;  Location: [facility] CATH INVASIVE LOCATION;  Service:    • CHOLECYSTECTOMY      lap   • CORONARY ANGIOPLASTY WITH STENT PLACEMENT     • FL RT/LT HEART CATHETERS N/A 3/15/2017    Procedure: Percutaneous Coronary Intervention;  Surgeon: Edwige Briceno MD;  Location: [facility] CATH INVASIVE LOCATION;  Service: Cardiovascular   • TONSILLECTOMY     • TRANSESOPHAGEAL ECHOCARDIOGRAM (MONICA)         FAMILY HISTORY:  Family History   Problem Relation Age of Onset   • Heart disease Mother    • Hypertension Mother    • Hyperlipidemia Mother    • Coronary artery disease Mother    • Diabetes Mother    • Obesity Mother    • Kidney failure Mother    • Sleep apnea Father    • Cancer Paternal Grandfather         SOCIAL HISTORY:  Social History     Socioeconomic History   • Marital status:      Spouse name: Cori   • Number of children: 0   • Years of education: Not on file   • Highest education level: Not on file   Social Needs   • Financial resource strain: Not on file   • Food insecurity - worry: Not on file   • Food insecurity - inability: Not on file   • Transportation needs - medical: Not on file   • Transportation needs - non-medical: Not on file   Occupational History   • Occupation: Disabled   Tobacco Use   • Smoking status: Former Smoker     Packs/day: 0.25     Years: 0.50     Pack years: 0.12     Types: Cigarettes     Last attempt to quit:      Years since quittin.1   • Smokeless tobacco:  Current User     Types: Snuff   • Tobacco comment: quit smoking 25 years ago; he does use snuff   Substance and Sexual Activity   • Alcohol use: No   • Drug use: No   • Sexual activity: Defer   Other Topics Concern   • Not on file   Social History Narrative   • Not on file       HOME MEDICATIONS:  Prior to Admission medications    Medication Sig Start Date End Date Taking? Authorizing Provider   amLODIPine (NORVASC) 10 MG tablet Take 1 tablet by mouth Every Night. 11/10/17  Yes Earnest Perez MD   atorvastatin (LIPITOR) 80 MG tablet Take 80 mg by mouth. 7/29/18  Yes Latrice Bolaños MD   fenofibrate (TRICOR) 145 MG tablet Take 145 mg by mouth Daily.   Yes Latrice Bolaños MD   glucose monitor monitoring kit 1 each As Needed (check blood glucose 3x daily). 1/22/18  Yes Froylan Keyes APRN   insulin aspart (novoLOG) 100 UNIT/ML injection Inject 25 Units under the skin into the appropriate area as directed 3 (Three) Times a Day Before Meals.  Patient taking differently: Inject 30 Units under the skin into the appropriate area as directed 3 (Three) Times a Day Before Meals. 9/11/18  Yes Suki Sharma APRN   insulin degludec (TRESIBA FLEXTOUCH) 100 UNIT/ML solution pen-injector injection Inject 75 Units under the skin into the appropriate area as directed Every Night.   Yes Latrice Bolaños MD   isosorbide mononitrate (IMDUR) 120 MG 24 hr tablet Take 1 tablet by mouth Daily.  Patient taking differently: Take 120 mg by mouth Every Night. 9/10/18  Yes Suki Sharma APRN   Liraglutide (VICTOZA SC) Inject  under the skin into the appropriate area as directed.   Yes Latrice Bolaños MD   lisinopril (PRINIVIL,ZESTRIL) 40 MG tablet Take 1 tablet by mouth Every Morning.  Patient taking differently: Take 40 mg by mouth Every Night. 11/10/17  Yes Earnest Perez MD   MICROLET LANCETS misc One touch delica lancets 4/2/18  Yes Froylan Keyes APRN   nitroglycerin (NITROSTAT) 0.4 MG SL tablet Place  "1 tablet under the tongue Every 5 (Five) Minutes As Needed for Chest Pain. Take no more than 3 doses in 15 minutes. 11/10/17  Yes Earnest Perez MD   ondansetron ODT (ZOFRAN-ODT) 4 MG disintegrating tablet Take 1 tablet by mouth Every 6 (Six) Hours As Needed for Nausea or Vomiting. 2/4/19  Yes Red Loya MD   pantoprazole (PROTONIX) 40 MG EC tablet Take 1 tablet by mouth Every Night. 11/10/17  Yes Earnest Perez MD   pramipexole (MIRAPEX) 1 MG tablet TAKE TWO TABLETS BY MOUTH EVERY NIGHT 6/21/18  Yes Earnest Perez MD   prasugrel (EFFIENT) 10 MG tablet Take 10 mg by mouth. 9/13/18  Yes ProviderLatrice MD   ranolazine (RANEXA) 1000 MG 12 hr tablet Take 1 tablet by mouth Every 12 (Twelve) Hours. 11/10/17  Yes Earnest Perez MD   RELION INSULIN SYRINGE 31G X 15/64\" 0.5 ML misc  1/18/18  Yes ProviderLatrice MD   rivaroxaban (XARELTO) 20 MG tablet Take 1 tablet by mouth Daily.  Patient taking differently: Take 20 mg by mouth Daily With Dinner. 2/2/18  Yes Letty Paige APRN   traZODone (DESYREL) 300 MG tablet TAKE ONE TABLET BY MOUTH EVERY NIGHT 6/21/18  Yes Earnest Perez MD   albuterol (ACCUNEB) 1.25 MG/3ML nebulizer solution Take 3 mL by nebulization Every 6 (Six) Hours As Needed for Wheezing. 11/15/17   Tahmina Reid APRN   albuterol (PROVENTIL HFA;VENTOLIN HFA) 108 (90 BASE) MCG/ACT inhaler Inhale 2 puffs Every 4 (Four) Hours As Needed for Wheezing.    Provider, MD Latrice   azithromycin (ZITHROMAX) 250 MG tablet Take 2 tablets the first day, then 1 tablet daily for 4 days.  Patient not taking: Reported on 2/20/2019 1/27/19   Asim Kumar MD   budesonide-formoterol (SYMBICORT) 160-4.5 MCG/ACT inhaler Inhale 2 puffs 2 (Two) Times a Day. 12/15/18   Artur Santana APRN   BYSTOLIC 10 MG tablet 5 mg 2 (Two) Times a Day. 10/4/18   Provider, MD Latrice   cephalexin (KEFLEX) 500 MG capsule Take 1 capsule by mouth 4 (Four) Times a Day.  Patient not " taking: Reported on 2/20/2019 2/4/19   Red Loya MD   fluticasone (FLONASE) 50 MCG/ACT nasal spray 2 sprays by Each Nare route Daily. 12/16/18   Artur Santana APRN   nystatin (MYCOSTATIN) 096679 UNIT/GM powder Apply  topically to the appropriate area as directed 2 (Two) Times a Day.  Patient not taking: Reported on 2/20/2019 2/4/19   Red Loya MD   predniSONE (DELTASONE) 20 MG tablet Take 1 tablet by mouth Daily.  Patient not taking: Reported on 2/20/2019 1/27/19   Asim Kumar MD   sertraline (ZOLOFT) 25 MG tablet Take 1 tablet by mouth Daily.  Patient taking differently: Take 25 mg by mouth Every Night. 11/10/17   Earnest Perez MD       ALLERGIES:  Tramadol and Risperidone    REVIEW OF SYSTEMS  Review of Systems   Constitutional: Negative for activity change, appetite change, chills, diaphoresis and fever.   HENT: Negative for congestion, rhinorrhea, sinus pressure, sinus pain and sore throat.    Eyes: Negative for visual disturbance.   Respiratory: Positive for chest tightness. Negative for apnea, cough, choking, shortness of breath and wheezing.    Cardiovascular: Positive for chest pain. Negative for palpitations and leg swelling.   Gastrointestinal: Positive for abdominal pain and nausea. Negative for abdominal distention, blood in stool, constipation, diarrhea and vomiting.   Genitourinary: Negative for difficulty urinating, dysuria, frequency, hematuria and urgency.   Musculoskeletal: Positive for arthralgias and back pain. Negative for joint swelling, myalgias and neck pain.   Skin: Negative for color change, pallor, rash and wound.   Neurological: Negative for dizziness, weakness, numbness and headaches.   Psychiatric/Behavioral: Negative for agitation and behavioral problems.       PHYSICAL EXAM:  Temp:  [97.2 °F (36.2 °C)-98.4 °F (36.9 °C)] 98.4 °F (36.9 °C)  Heart Rate:  [55-77] 66  Resp:  [18-20] 18  BP: (107-142)/(51-81) 107/59  Body mass index is 61.65  kg/m².  Physical Exam   Constitutional: He is oriented to person, place, and time. He appears well-developed and well-nourished. No distress.   Morbidly obese  male sitting comfortably in bed in no acute cardiopulmonary distress   HENT:   Head: Normocephalic and atraumatic.   Nose: Nose normal.   Eyes: Conjunctivae and EOM are normal. Pupils are equal, round, and reactive to light. Right eye exhibits no discharge. Left eye exhibits no discharge. No scleral icterus.   Neck: Normal range of motion. Neck supple. No thyromegaly present.   Cardiovascular: Normal rate, regular rhythm and intact distal pulses. Exam reveals no gallop and no friction rub.   No murmur heard.  Distant heart sounds   Pulmonary/Chest: Effort normal and breath sounds normal. No respiratory distress. He has no wheezes. He has no rales. He exhibits no tenderness.   Abdominal: Soft. Bowel sounds are normal. He exhibits no distension and no mass. There is no tenderness. There is no guarding.   Musculoskeletal: Normal range of motion. He exhibits no edema, tenderness or deformity.   Lymphadenopathy:     He has no cervical adenopathy.   Neurological: He is alert and oriented to person, place, and time. No cranial nerve deficit.   Skin: Skin is warm and dry. Capillary refill takes 2 to 3 seconds. He is not diaphoretic.   Psychiatric: He has a normal mood and affect. His behavior is normal. Judgment and thought content normal.       DIAGNOSTIC DATA:   Lab Results (last 24 hours)     Procedure Component Value Units Date/Time    POC Glucose Once [[id number]]  (Abnormal) Collected:  02/21/19 1008    Specimen:  Blood Updated:  02/21/19 1031     Glucose 173 mg/dL      Comment: RN NotifiedOperator: [id number] ARPAN ARAISMeter ID: [id number]       POC Glucose Once [[id number]]  (Abnormal) Collected:  02/21/19 0552    Specimen:  Blood Updated:  02/21/19 0644     Glucose 156 mg/dL      Comment: RN NotifiedOperator: [id number] DANIELLE STAPLESMeter ID:  [id number]       Comprehensive Metabolic Panel [[id number]]  (Abnormal) Collected:  02/21/19 0525    Specimen:  Blood Updated:  02/21/19 0602     Glucose 130 mg/dL      BUN 11 mg/dL      Creatinine 1.61 mg/dL      Sodium 135 mmol/L      Potassium 4.3 mmol/L      Chloride 100 mmol/L      CO2 27.0 mmol/L      Calcium 9.0 mg/dL      Total Protein 6.6 g/dL      Albumin 3.50 g/dL      ALT (SGPT) 28 U/L      AST (SGOT) 23 U/L      Alkaline Phosphatase 61 U/L      Total Bilirubin 0.7 mg/dL      eGFR Non African Amer 48 mL/min/1.73      Globulin 3.1 gm/dL      A/G Ratio 1.1 g/dL      BUN/Creatinine Ratio 6.8     Anion Gap 8.0 mmol/L     CBC Auto Differential [[id number]]  (Abnormal) Collected:  02/21/19 0525    Specimen:  Blood Updated:  02/21/19 0552     WBC 11.97 10*3/mm3      RBC 4.67 10*6/mm3      Hemoglobin 12.7 g/dL      Hematocrit 39.3 %      MCV 84.2 fL      MCH 27.2 pg      MCHC 32.3 g/dL      RDW 15.0 %      RDW-SD 45.9 fl      MPV 9.1 fL      Platelets 212 10*3/mm3      Neutrophil % 72.4 %      Lymphocyte % 13.3 %      Monocyte % 9.4 %      Eosinophil % 2.8 %      Basophil % 0.3 %      Immature Grans % 1.8 %      Neutrophils, Absolute 8.67 10*3/mm3      Lymphocytes, Absolute 1.59 10*3/mm3      Monocytes, Absolute 1.13 10*3/mm3      Eosinophils, Absolute 0.33 10*3/mm3      Basophils, Absolute 0.04 10*3/mm3      Immature Grans, Absolute 0.21 10*3/mm3      nRBC 0.0 /100 WBC     POC Glucose Once [[id number]]  (Abnormal) Collected:  02/20/19 1927    Specimen:  Blood Updated:  02/20/19 2143     Glucose 265 mg/dL      Comment: RN NotifiedOperator: [id number] DANIELLE NIEVESFERMeter ID: [id number]       Lipase [[id number]]  (Abnormal) Collected:  02/20/19 1017    Specimen:  Blood Updated:  02/20/19 1354     Lipase 1,115 U/L     Amylase [[id number]]  (Normal) Collected:  02/20/19 1017    Specimen:  Blood Updated:  02/20/19 1354     Amylase 104 U/L            Imaging Results (last 24 hours)     Procedure Component Value Units  Date/Time    CT Angiogram Chest With Contrast [[id number]] Collected:  02/20/19 1506     Updated:  02/20/19 1600    Narrative:       PROCEDURE: CT CHEST ANGIOGRAPHY WITH IV CONTRAST, CT ABDOMEN  PELVIS WITH IV CONTRAST    CLINICAL INFORMATION:  Chest pain. Nausea and vomiting with  diarrhea.     TECHNIQUE: Axial images were obtained and multiplanar  reconstructions were made.    Dose length product 4862  This exam was performed using radiation doses that are as low as  reasonably achievable (ALARA).  This exam was performed according to our departmental dose  optimization program, which includes automated exposure control,  adjustment of the mA and/or KV according to patient size and/or  use of iterative reconstruction technique.  CONTRAST:   100 cc intravenous Isovue 370  COMPARISON: CT abdomen and pelvis 9/4/2018. CT chest 12/20/2018.    Note: Reconstructed MIP images were obtained in multiple  obliquities. No 3-D surface rendered images were obtained for  this exam.    FINDINGS:  PULMONARY CTA: Very limited evaluation of the pulmonary arteries  due to streak artifact. The pulmonary trunk and main pulmonary  arteries appear patent. No obvious pulmonary embolism.  OTHER VASCULAR: Unremarkable    LUNGS/PLEURA: The lungs are normal.  TRACHEA AND BRONCHI:  The trachea and bronchi are patent.  MEDIASTINUM, ANDREA AND LYMPH NODES: The mediastinum, andrea and  lymph nodes are normal.  HEART AND PERICARDIUM: The heart and pericardium are normal.  OSSEOUS STRUCTURES: Unremarkable.    Streak artifact in the abdomen limits evaluation.    HEPATOBILIARY: Unremarkable.  SPLEEN: Unremarkable.  PANCREAS: Unremarkable  ADRENAL GLANDS: Unremarkable.  KIDNEYS/URETERS: No evidence of hydronephrosis or suspicious  mass.    GASTROINTESTINAL: Unremarkable  REPRODUCTIVE ORGANS: Unremarkable.  URINARY BLADDER: Unremarkable    VASCULAR: Unremarkable  LYMPH NODES: No pathologically enlarged nodes by size criteria.  PERITONEUM/RETROPERITONEUM:  Unremarkable.     OSSEOUS STRUCTURES: Unremarkable.        Impression:       CONCLUSION:   Limited exam due to streak artifact associated with patient's  body habitus.  No obvious pulmonary embolism.  CT of the chest, abdomen and pelvis unremarkable.    Electronically signed by:  Earnest Fish MD  2/20/2019 3:58 PM CST  Workstation: [id number]    CT Abdomen Pelvis With Contrast [[id number]] Collected:  02/20/19 1506     Updated:  02/20/19 1600    Narrative:       PROCEDURE: CT CHEST ANGIOGRAPHY WITH IV CONTRAST, CT ABDOMEN  PELVIS WITH IV CONTRAST    CLINICAL INFORMATION:  Chest pain. Nausea and vomiting with  diarrhea.     TECHNIQUE: Axial images were obtained and multiplanar  reconstructions were made.    Dose length product 4862  This exam was performed using radiation doses that are as low as  reasonably achievable (ALARA).  This exam was performed according to our departmental dose  optimization program, which includes automated exposure control,  adjustment of the mA and/or KV according to patient size and/or  use of iterative reconstruction technique.  CONTRAST:   100 cc intravenous Isovue 370  COMPARISON: CT abdomen and pelvis 9/4/2018. CT chest 12/20/2018.    Note: Reconstructed MIP images were obtained in multiple  obliquities. No 3-D surface rendered images were obtained for  this exam.    FINDINGS:  PULMONARY CTA: Very limited evaluation of the pulmonary arteries  due to streak artifact. The pulmonary trunk and main pulmonary  arteries appear patent. No obvious pulmonary embolism.  OTHER VASCULAR: Unremarkable    LUNGS/PLEURA: The lungs are normal.  TRACHEA AND BRONCHI:  The trachea and bronchi are patent.  MEDIASTINUM, ANDREA AND LYMPH NODES: The mediastinum, andrea and  lymph nodes are normal.  HEART AND PERICARDIUM: The heart and pericardium are normal.  OSSEOUS STRUCTURES: Unremarkable.    Streak artifact in the abdomen limits evaluation.    HEPATOBILIARY: Unremarkable.  SPLEEN: Unremarkable.  PANCREAS:  Unremarkable  ADRENAL GLANDS: Unremarkable.  KIDNEYS/URETERS: No evidence of hydronephrosis or suspicious  mass.    GASTROINTESTINAL: Unremarkable  REPRODUCTIVE ORGANS: Unremarkable.  URINARY BLADDER: Unremarkable    VASCULAR: Unremarkable  LYMPH NODES: No pathologically enlarged nodes by size criteria.  PERITONEUM/RETROPERITONEUM: Unremarkable.     OSSEOUS STRUCTURES: Unremarkable.        Impression:       CONCLUSION:   Limited exam due to streak artifact associated with patient's  body habitus.  No obvious pulmonary embolism.  CT of the chest, abdomen and pelvis unremarkable.    Electronically signed by:  Earnest Fish MD  2/20/2019 3:58 PM CST  Workstation: [id number]          I reviewed the patient's new clinical results.    ASSESSMENT AND PLAN: This is a 38 y.o. male with:    Active Hospital Problems    Diagnosis Date Noted   • **Idiopathic acute pancreatitis [K85.00] 02/20/2019     Priority: Medium     Patient with elevated lipase of 1100, history of elevated triglyceridemia, patient with added vibrate therapy to statin therapy.  Adding CT abdomen pelvis and making patient n.p.o. and advancing diet as tolerated with chest pain versus abdominal pain.  -CT abdomen and pelvis with contrast bracket following CTA of the chest for PE  -N.p.o. diet will advance diet slowly per pancreatitis protocol  -Pain management with morphine as part of BENITO protocol for chest pain  - Daily CMP  --IVF at 250     • Chest pain, rule out acute myocardial infarction [R07.9] 09/30/2018     Priority: High     Patient with substernal chest pressure 5/10, patient been noncompliant with many medications running out 1 month previous.  This includes his direct oral anticoagulant of rivaroxaban.  In the ED received aspirin, nitroglycerin which alleviated some chest pain.  Most likely pleuritic versus abdominal in origin.  Does not change with food or position or palpation.  Differential includes pain from pulmonary embolism, acute coronary  syndrome, biliary disease, pancreatic disease, pleuritic chest pain.  --troponins-negative  - No significant EKG changes  -Continue BENITO therapy  -Rivaroxaban 20 mg p.o. every afternoon  -CT angiogram of chest- negative     • Type 2 diabetes mellitus without complication, with long-term current use of insulin (CMS/[facility]) [E11.9, Z79.4] 03/30/2018     Priority: Medium     Patient on profound a.m. long-acting degludec insulin.  Patient also on pre-meal insulin.  Patient also on GLP-1 agonist.  Will hold GLP-1 agonist and replace with short acting insulin sliding scale.  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  Is contributing to history of multiple bilateral pulmonary emboli.  Patient has been off anticoagulation for 1 month, resuming.  -Rivaroxaban 20 mg p.o. q. p.m.     • Chronic pulmonary embolism (CMS/[facility]) [I27.82] 04/22/2017     Priority: Medium     Patient with CTA chest positive 12/2017 with bilateral pulmonary emboli.  Patient has frequent history of CTA chests.  Patient however has been off on anticoagulation for greater than a months time. Chest pain unclear if pulmonary in origin.  D-dimer elevated in the emergency department.  -CTA of chest, negative  -Rivaroxaban 20 mg p.o. q. p.m.     • Essential hypertension [I10]      Priority: Low     Patient on multiple antihypertensives.  Patient without hypertensive urgency/emergency.  Will resume home medications and trend blood pressures per hospital protocol.  - Amlodipine 10 mg p.o. nightly  -Isosorbide mononitrate 120 milligrams p.o. nightly  - Lisinopril 40 mg p.o. nightly  -Nebivolol 5 mg p.o. daily     • CAD (coronary artery disease) [I25.10] 03/30/2018     Patient previously noncompliant with  double stenting of LAD.  Resuming medically optimized medications.  Aspirin therapy being held with direct oral anticoagulation combined with antiplatelet agent.  -Atorvastatin 80 mg p.o. daily  -Fenofibrate 145 mg p.o. daily  -Isosorbide mononitrate 120 mg p.o. nightly  -Lisinopril 40 mg p.o. nightly  - Nebivolol 5 mg p.o. daily  - Prasugrel 10 mg p.o. daily  -Ranolazine 1000 mg p.o. twice daily     • Class 3 severe obesity due to excess calories with serious comorbidity and body mass index (BMI) of 60.0 to 69.9 in adult (CMS/[facility]) [E66.01, Z68.44] 03/08/2018     Body mass index is 62.94 kg/m².  -Nutrition consult       • GERD (gastroesophageal reflux disease) [K21.9]      Resuming home medications.  -Pantoprazole 40 mg p.o. every morning     • RLS (restless legs syndrome) [G25.81]      History of restless leg syndrome resuming home medications.  -Pramipexole 1 mg p.o. nightly     • ADOLFO on CPAP [G47.33, Z99.89] 02/17/2016     Patient with obesity hypoventilation syndrome and obstructive sleep apnea on CPAP at at bedtime.  -Respiratory therapy for CPAP.     • Recurrent major depressive disorder, in partial remission (CMS/[facility]) [F33.41] 11/24/2009     Patient with history of psychogenic insomnia.  Currently not in exacerbation resuming home medications.  - Sertraline 25 mg p.o. nightly  -Trazodone 300 mg p.o. nightly           DVT prophylaxis: Rivaroxaban 20 mg p.o. every afternoon  Code status is   Code Status and Medical Interventions:   Ordered at: 02/20/19 1126     Level Of Support Discussed With:    Patient     Code Status:    CPR     Medical Interventions (Level of Support Prior to Arrest):    Full      CARY # [id number], reviewed and consistent with patient reported medications.      I discussed the patients findings and my recommendations with patient.     Dr. Fischer is the attending on record at time of admission, He is aware of the patient's status and agrees with the above history and  physical.    Anticipated discharge in 24-72 hours.

## 2019-02-20 NOTE — PLAN OF CARE
Problem: Patient Care Overview  Goal: Plan of Care Review  Outcome: Ongoing (interventions implemented as appropriate)   02/20/19 1039   Coping/Psychosocial   Plan of Care Reviewed With patient   Plan of Care Review   Progress no change   OTHER   Outcome Summary initial assessment

## 2019-02-20 NOTE — ED PROVIDER NOTES
Subjective   38 years old morbidly obese male with history of coronary artery disease status post stenting, diabetes mellitus, hypertension, hyperlipidemia, history of pulmonary embolism recently out of his Xarelto presented in the ER with intermittent chest pain since last night which is constant and worsening for the last 3 hours.  Is associated with mild shortness of breath as well.        History provided by:  Patient  Chest Pain   Pain location:  Substernal area  Pain quality: sharp    Pain radiates to:  L arm  Pain severity:  Severe  Onset quality:  Gradual  Duration:  12 days  Timing:  Intermittent  Progression:  Worsening  Chronicity:  New  Relieved by:  Nothing  Worsened by:  Nothing  Ineffective treatments:  None tried  Associated symptoms: shortness of breath    Associated symptoms: no abdominal pain, no back pain, no cough, no fever, no headache and no palpitations    Risk factors: coronary artery disease, diabetes mellitus, high cholesterol, hypertension, male sex and obesity        Review of Systems   Constitutional: Negative for chills and fever.   HENT: Negative for congestion, postnasal drip, sinus pressure and sore throat.    Respiratory: Positive for shortness of breath. Negative for cough.    Cardiovascular: Positive for chest pain. Negative for palpitations.   Gastrointestinal: Negative for abdominal pain.   Genitourinary: Negative for flank pain.   Musculoskeletal: Negative for back pain.   Neurological: Negative for headaches.   Psychiatric/Behavioral: Negative for agitation.       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HCC)    • Fatty liver    • GERD (gastroesophageal reflux disease)    • Hyperlipidemia    • Hypertension    • Morbid obesity (CMS/HCC)    • Pulmonary embolism (CMS/HCC)    • RLS (restless legs syndrome)    • Seizures  (CMS/[facility])     last one many years ago   • Sleep apnea     c-pap       Allergies   Allergen Reactions   • Glipizide Other (See Comments) and Unknown (See Comments)     Slurred Speech  Slurred Speech  Hallucinations, Slurred Speech   • Metoclopramide Anxiety   • Tramadol Other (See Comments)     seizures   • Risperidone Other (See Comments)     Slurred speech  Can't stand, trouble breathing, slurred speech         Past Surgical History:   Procedure Laterality Date   • ADENOIDECTOMY     • CARDIAC CATHETERIZATION N/A 3/15/2017    Procedure: Left Heart Cath;  Surgeon: Edwige Briceno MD;  Location: [facility] CATH INVASIVE LOCATION;  Service:    • CARDIAC CATHETERIZATION N/A 3/15/2017    Procedure: Stent SOPHIA coronary;  Surgeon: Edwige Briceno MD;  Location: [facility] CATH INVASIVE LOCATION;  Service:    • CARDIAC CATHETERIZATION N/A 3/1/2018    Procedure: Left Heart Cath;  Surgeon: Conor Parsons MD;  Location: [facility] CATH INVASIVE LOCATION;  Service:    • CHOLECYSTECTOMY      lap   • CORONARY ANGIOPLASTY WITH STENT PLACEMENT     • MI RT/LT HEART CATHETERS N/A 3/15/2017    Procedure: Percutaneous Coronary Intervention;  Surgeon: Edwige Briceno MD;  Location: [facility] CATH INVASIVE LOCATION;  Service: Cardiovascular   • TONSILLECTOMY     • TRANSESOPHAGEAL ECHOCARDIOGRAM (MONICA)         Family History   Problem Relation Age of Onset   • Heart disease Mother    • Hypertension Mother    • Hyperlipidemia Mother    • Coronary artery disease Mother    • Diabetes Mother    • Obesity Mother    • Kidney failure Mother    • Sleep apnea Father    • Cancer Paternal Grandfather        Social History     Socioeconomic History   • Marital status:      Spouse name: Cori   • Number of children: 0   • Years of education: Not on file   • Highest education level: Not on file   Occupational History   • Occupation: Disabled   Tobacco Use   • Smoking status: Former Smoker     Packs/day: 0.25     Years: 0.50     Pack years: 0.12     Types:  Cigarettes     Last attempt to quit:      Years since quittin.1   • Smokeless tobacco: Current User     Types: Snuff   • Tobacco comment: quit smoking 25 years ago; he does use snuff   Substance and Sexual Activity   • Alcohol use: No   • Drug use: No   • Sexual activity: Defer           Objective   Physical Exam   Constitutional: He appears well-developed and well-nourished.   HENT:   Head: Normocephalic and atraumatic.   Eyes: Pupils are equal, round, and reactive to light.   Neck: Normal range of motion.   Cardiovascular: Normal rate and regular rhythm.   Pulmonary/Chest: Effort normal and breath sounds normal.   Musculoskeletal:        Right lower leg: He exhibits edema.        Left lower leg: He exhibits edema.   Neurological: He is alert.   Skin: Skin is warm and dry. Capillary refill takes less than 2 seconds.   Psychiatric: He has a normal mood and affect.   Nursing note and vitals reviewed.      ECG 12 Lead    Date/Time: 2019 7:50 AM  Performed by: Simon Roldan MD  Authorized by: Simon Roldan MD   Interpreted by physician  Rhythm: sinus rhythm  Rate: normal  BPM: 89  QRS axis: normal  ST Segments: ST segments normal  T Waves: T waves normal  Clinical impression: normal ECG                 ED Course                  MDM  Number of Diagnoses or Management Options  Chest pain, rule out acute myocardial infarction:   Diagnosis management comments: 38 years old with multiple comorbidities for coronary artery disease is evaluated for chest pain.  EKG shows normal sinus rhythm.  He is given morphine and Nitropaste which helped with the elevated blood pressure and chest pain but still have some discomfort/pressure.  Patient is not taking Xarelto and D-dimers are mildly elevated.  Patient has multiple CTAs done in the past.  I would obtain ultrasound bilateral lower extremities and if positive, will proceed with CTA.  Patient would be restarted on Xarelto.  Discussed with Dr. Newman  and patient is  admitted for chest pain rule out.       Amount and/or Complexity of Data Reviewed  Clinical lab tests: ordered and reviewed  Tests in the radiology section of CPT®: ordered and reviewed  Discuss the patient with other providers: yes      Labs Reviewed   COMPREHENSIVE METABOLIC PANEL - Abnormal; Notable for the following components:       Result Value    Glucose 238 (*)     Creatinine 0.51 (*)     Sodium 131 (*)     eGFR Non  Amer 180 (*)     All other components within normal limits   CBC WITH AUTO DIFFERENTIAL - Abnormal; Notable for the following components:    Hemoglobin 12.9 (*)     Lymphocyte % 18.4 (*)     Immature Grans % 1.2 (*)     Immature Grans, Absolute 0.10 (*)     All other components within normal limits   D-DIMER, QUANTITATIVE - Abnormal; Notable for the following components:    D-Dimer, Quantitative 679 (*)     All other components within normal limits    Narrative:     Dimer values <500 ng/ml FEU are FDA approved as aid in diagnosis of deep venous thrombosis and pulmonary embolism.  This test should not be used in an exclusion strategy with pretest probability alone.    A recent guideline regarding diagnosis for pulmonary thromboembolism recommends an adjusted exclusion criterion of age x 10 ng/ml FEU for patients >50 years of age ([employer]ori Intern Med 2015; 163: 701-711).   TROPONIN (IN-HOUSE) - Normal   BNP (IN-HOUSE) - Normal   PROTIME-INR - Normal    Narrative:     Therapeutic range for most indications is 2.0-3.0 INR,  or 2.5-3.5 for mechanical heart valves.   RAINBOW DRAW    Narrative:     The following orders were created for panel order [place] Draw.  Procedure                               Abnormality         Status                     ---------                               -----------         ------                     Light Blue Top[[id number]]                                   Final result               Green Top (Gel)[[id number]]                                  Final result                Lavender Top[[id number]]                                     Final result               Gold Top - SST[[id number]]                                   Final result                 Please view results for these tests on the individual orders.   TROPONIN (IN-HOUSE)   CBC AND DIFFERENTIAL    Narrative:     The following orders were created for panel order CBC & Differential.  Procedure                               Abnormality         Status                     ---------                               -----------         ------                     CBC Auto Differential[[id number]]        Abnormal            Final result                 Please view results for these tests on the individual orders.   LIGHT BLUE TOP   GREEN TOP   LAVENDER TOP   GOLD TOP - SST       Xr Chest 1 View    Result Date: 2/20/2019  Narrative: Chest single view. CLINICAL INDICATION: Shortness of breath. Chest pain protocol COMPARISON: January 27, 2019. FINDINGS: Cardiac silhouette is normal in size. Pulmonary vascularity is unremarkable. No focal infiltrate or consolidation.  No pleural effusion.  No pneumothorax. Old healed left rib fractures are identified. No change since prior exam.     Impression: CONCLUSION: No evidence of active disease. No change since prior exam. Electronically signed by:  Al Rodriguez MD  2/20/2019 7:41 AM CST Workstation: [id number]    Xr Chest 1 View    Result Date: 1/27/2019  Narrative: PORTABLE CHEST HISTORY: Chest pain. Mid chest pain. Portable AP upright film of the chest was obtained at 11:28 AM. COMPARISON: January 4, 2019 EKG leads in place. The lungs are clear of an acute process. The heart is not enlarged. The pulmonary vasculature is not increased. No pleural effusion. No pneumothorax. No acute osseous abnormality.     Impression: CONCLUSION: No Acute Disease [id number] Electronically signed by:  Kulwant Currie MD  1/27/2019 11:50 AM CST Workstation: [id number]    Us Venous Doppler Lower Extremity Left (duplex)    Result  Date: 1/27/2019  Narrative: Ultrasound venous duplex left lower extremity HISTORY: [place] Duplex ultrasound of the deep venous system of the left lower extremity was performed Real-time images demonstrate normal compressibility without evidence of intraluminal thrombus. Doppler shows phasic flow and augmentation. Color Doppler also reveals venous patency.     Impression: CONCLUSION: No ultrasound evidence of deep venous thrombosis of the left lower extremity. [id number] Electronically signed by:  Kulwant Currie MD  1/27/2019 12:35 PM CST Workstation: [id number]            Final diagnoses:   Chest pain, rule out acute myocardial infarction            Simon Roldan MD  02/20/19 0847       Simon Roldan MD  02/20/19 0905

## 2019-02-21 LAB
ALBUMIN SERPL-MCNC: 3.5 G/DL (ref 3.4–4.8)
ALBUMIN/GLOB SERPL: 1.1 G/DL (ref 1.1–1.8)
ALP SERPL-CCNC: 61 U/L (ref 38–126)
ALT SERPL W P-5'-P-CCNC: 28 U/L (ref 21–72)
ANION GAP SERPL CALCULATED.3IONS-SCNC: 7 MMOL/L (ref 5–15)
ANION GAP SERPL CALCULATED.3IONS-SCNC: 8 MMOL/L (ref 5–15)
AST SERPL-CCNC: 23 U/L (ref 17–59)
BACTERIA UR QL AUTO: NORMAL /HPF
BASOPHILS # BLD AUTO: 0.04 10*3/MM3 (ref 0–0.2)
BASOPHILS NFR BLD AUTO: 0.3 % (ref 0–1.5)
BILIRUB SERPL-MCNC: 0.7 MG/DL (ref 0.2–1.3)
BILIRUB UR QL STRIP: NEGATIVE
BUN BLD-MCNC: 11 MG/DL (ref 7–21)
BUN BLD-MCNC: 14 MG/DL (ref 7–21)
BUN/CREAT SERPL: 12.4 (ref 7–25)
BUN/CREAT SERPL: 6.8 (ref 7–25)
CALCIUM SPEC-SCNC: 8.7 MG/DL (ref 8.4–10.2)
CALCIUM SPEC-SCNC: 9 MG/DL (ref 8.4–10.2)
CHLORIDE SERPL-SCNC: 100 MMOL/L (ref 95–110)
CHLORIDE SERPL-SCNC: 95 MMOL/L (ref 95–110)
CLARITY UR: CLEAR
CO2 SERPL-SCNC: 27 MMOL/L (ref 22–31)
CO2 SERPL-SCNC: 28 MMOL/L (ref 22–31)
COLOR UR: YELLOW
CREAT BLD-MCNC: 1.13 MG/DL (ref 0.7–1.3)
CREAT BLD-MCNC: 1.61 MG/DL (ref 0.7–1.3)
DEPRECATED RDW RBC AUTO: 45.9 FL (ref 37–54)
EOSINOPHIL # BLD AUTO: 0.33 10*3/MM3 (ref 0–0.4)
EOSINOPHIL NFR BLD AUTO: 2.8 % (ref 0.3–6.2)
ERYTHROCYTE [DISTWIDTH] IN BLOOD BY AUTOMATED COUNT: 15 % (ref 12.3–15.4)
GFR SERPL CREATININE-BSD FRML MDRD: 48 ML/MIN/1.73 (ref 63–147)
GFR SERPL CREATININE-BSD FRML MDRD: 72 ML/MIN/1.73 (ref 63–147)
GLOBULIN UR ELPH-MCNC: 3.1 GM/DL (ref 2.3–3.5)
GLUCOSE BLD-MCNC: 130 MG/DL (ref 60–100)
GLUCOSE BLD-MCNC: 232 MG/DL (ref 60–100)
GLUCOSE BLDC GLUCOMTR-MCNC: 145 MG/DL (ref 70–130)
GLUCOSE BLDC GLUCOMTR-MCNC: 156 MG/DL (ref 70–130)
GLUCOSE BLDC GLUCOMTR-MCNC: 173 MG/DL (ref 70–130)
GLUCOSE BLDC GLUCOMTR-MCNC: 253 MG/DL (ref 70–130)
GLUCOSE UR STRIP-MCNC: ABNORMAL MG/DL
HCT VFR BLD AUTO: 39.3 % (ref 37.5–51)
HGB BLD-MCNC: 12.7 G/DL (ref 13–17.7)
HGB UR QL STRIP.AUTO: NEGATIVE
HYALINE CASTS UR QL AUTO: NORMAL /LPF
IMM GRANULOCYTES # BLD AUTO: 0.21 10*3/MM3 (ref 0–0.05)
IMM GRANULOCYTES NFR BLD AUTO: 1.8 % (ref 0–0.5)
KETONES UR QL STRIP: NEGATIVE
LEUKOCYTE ESTERASE UR QL STRIP.AUTO: NEGATIVE
LYMPHOCYTES # BLD AUTO: 1.59 10*3/MM3 (ref 0.7–3.1)
LYMPHOCYTES NFR BLD AUTO: 13.3 % (ref 19.6–45.3)
MCH RBC QN AUTO: 27.2 PG (ref 26.6–33)
MCHC RBC AUTO-ENTMCNC: 32.3 G/DL (ref 31.5–35.7)
MCV RBC AUTO: 84.2 FL (ref 79–97)
MONOCYTES # BLD AUTO: 1.13 10*3/MM3 (ref 0.1–0.9)
MONOCYTES NFR BLD AUTO: 9.4 % (ref 5–12)
NEUTROPHILS # BLD AUTO: 8.67 10*3/MM3 (ref 1.4–7)
NEUTROPHILS NFR BLD AUTO: 72.4 % (ref 42.7–76)
NITRITE UR QL STRIP: NEGATIVE
NRBC BLD AUTO-RTO: 0 /100 WBC (ref 0–0)
PH UR STRIP.AUTO: 6 [PH] (ref 5–9)
PLATELET # BLD AUTO: 212 10*3/MM3 (ref 140–450)
PMV BLD AUTO: 9.1 FL (ref 6–12)
POTASSIUM BLD-SCNC: 4.1 MMOL/L (ref 3.5–5.1)
POTASSIUM BLD-SCNC: 4.3 MMOL/L (ref 3.5–5.1)
PROT SERPL-MCNC: 6.6 G/DL (ref 6.3–8.6)
PROT UR QL STRIP: ABNORMAL
RBC # BLD AUTO: 4.67 10*6/MM3 (ref 4.14–5.8)
RBC # UR: NORMAL /HPF
REF LAB TEST METHOD: NORMAL
SODIUM BLD-SCNC: 130 MMOL/L (ref 137–145)
SODIUM BLD-SCNC: 135 MMOL/L (ref 137–145)
SP GR UR STRIP: 1.02 (ref 1–1.03)
SQUAMOUS #/AREA URNS HPF: NORMAL /HPF
UROBILINOGEN UR QL STRIP: ABNORMAL
WBC NRBC COR # BLD: 11.97 10*3/MM3 (ref 3.4–10.8)
WBC UR QL AUTO: NORMAL /HPF

## 2019-02-21 PROCEDURE — 96361 HYDRATE IV INFUSION ADD-ON: CPT

## 2019-02-21 PROCEDURE — G0378 HOSPITAL OBSERVATION PER HR: HCPCS

## 2019-02-21 PROCEDURE — 99220 PR INITIAL OBSERVATION CARE/DAY 70 MINUTES: CPT | Performed by: STUDENT IN AN ORGANIZED HEALTH CARE EDUCATION/TRAINING PROGRAM

## 2019-02-21 PROCEDURE — 63710000001 INSULIN DETEMIR PER 5 UNITS: Performed by: STUDENT IN AN ORGANIZED HEALTH CARE EDUCATION/TRAINING PROGRAM

## 2019-02-21 PROCEDURE — 85025 COMPLETE CBC W/AUTO DIFF WBC: CPT | Performed by: STUDENT IN AN ORGANIZED HEALTH CARE EDUCATION/TRAINING PROGRAM

## 2019-02-21 PROCEDURE — 63710000001 PREDNISONE PER 1 MG: Performed by: STUDENT IN AN ORGANIZED HEALTH CARE EDUCATION/TRAINING PROGRAM

## 2019-02-21 PROCEDURE — 63710000001 INSULIN ASPART PER 5 UNITS: Performed by: STUDENT IN AN ORGANIZED HEALTH CARE EDUCATION/TRAINING PROGRAM

## 2019-02-21 PROCEDURE — 80053 COMPREHEN METABOLIC PANEL: CPT | Performed by: STUDENT IN AN ORGANIZED HEALTH CARE EDUCATION/TRAINING PROGRAM

## 2019-02-21 PROCEDURE — 82962 GLUCOSE BLOOD TEST: CPT

## 2019-02-21 PROCEDURE — 94799 UNLISTED PULMONARY SVC/PX: CPT

## 2019-02-21 PROCEDURE — 81001 URINALYSIS AUTO W/SCOPE: CPT | Performed by: STUDENT IN AN ORGANIZED HEALTH CARE EDUCATION/TRAINING PROGRAM

## 2019-02-21 PROCEDURE — 63710000001 PROMETHAZINE PER 12.5 MG: Performed by: STUDENT IN AN ORGANIZED HEALTH CARE EDUCATION/TRAINING PROGRAM

## 2019-02-21 RX ORDER — PROMETHAZINE HYDROCHLORIDE 12.5 MG/1
12.5 TABLET ORAL EVERY 6 HOURS PRN
Status: DISCONTINUED | OUTPATIENT
Start: 2019-02-21 | End: 2019-02-22 | Stop reason: HOSPADM

## 2019-02-21 RX ORDER — PROMETHAZINE HYDROCHLORIDE 12.5 MG/1
12.5 SUPPOSITORY RECTAL EVERY 6 HOURS PRN
Status: DISCONTINUED | OUTPATIENT
Start: 2019-02-21 | End: 2019-02-22 | Stop reason: HOSPADM

## 2019-02-21 RX ORDER — PROMETHAZINE HYDROCHLORIDE 25 MG/ML
12.5 INJECTION, SOLUTION INTRAMUSCULAR; INTRAVENOUS EVERY 6 HOURS PRN
Status: DISCONTINUED | OUTPATIENT
Start: 2019-02-21 | End: 2019-02-22 | Stop reason: HOSPADM

## 2019-02-21 RX ORDER — HYDROCODONE BITARTRATE AND ACETAMINOPHEN 10; 325 MG/1; MG/1
1 TABLET ORAL EVERY 6 HOURS PRN
Status: DISCONTINUED | OUTPATIENT
Start: 2019-02-21 | End: 2019-02-22 | Stop reason: HOSPADM

## 2019-02-21 RX ORDER — SODIUM CHLORIDE 9 MG/ML
125 INJECTION, SOLUTION INTRAVENOUS CONTINUOUS
Status: DISCONTINUED | OUTPATIENT
Start: 2019-02-21 | End: 2019-02-22 | Stop reason: HOSPADM

## 2019-02-21 RX ADMIN — RANOLAZINE 1000 MG: 500 TABLET, FILM COATED, EXTENDED RELEASE ORAL at 21:09

## 2019-02-21 RX ADMIN — PANTOPRAZOLE SODIUM 40 MG: 40 TABLET, DELAYED RELEASE ORAL at 21:10

## 2019-02-21 RX ADMIN — ISOSORBIDE MONONITRATE 120 MG: 60 TABLET, EXTENDED RELEASE ORAL at 21:09

## 2019-02-21 RX ADMIN — PROMETHAZINE HYDROCHLORIDE 12.5 MG: 12.5 TABLET ORAL at 11:07

## 2019-02-21 RX ADMIN — INSULIN ASPART 6 UNITS: 100 INJECTION, SOLUTION INTRAVENOUS; SUBCUTANEOUS at 21:10

## 2019-02-21 RX ADMIN — SODIUM CHLORIDE, PRESERVATIVE FREE 3 ML: 5 INJECTION INTRAVENOUS at 21:10

## 2019-02-21 RX ADMIN — PRASUGREL HYDROCHLORIDE 10 MG: 10 TABLET, FILM COATED ORAL at 09:42

## 2019-02-21 RX ADMIN — NEBIVOLOL HYDROCHLORIDE 5 MG: 5 TABLET ORAL at 09:42

## 2019-02-21 RX ADMIN — TRAZODONE HYDROCHLORIDE 300 MG: 150 TABLET ORAL at 21:09

## 2019-02-21 RX ADMIN — BUDESONIDE AND FORMOTEROL FUMARATE DIHYDRATE 2 PUFF: 160; 4.5 AEROSOL RESPIRATORY (INHALATION) at 18:55

## 2019-02-21 RX ADMIN — SODIUM CHLORIDE 100 ML/HR: 9 INJECTION, SOLUTION INTRAVENOUS at 09:26

## 2019-02-21 RX ADMIN — RANOLAZINE 1000 MG: 500 TABLET, FILM COATED, EXTENDED RELEASE ORAL at 09:42

## 2019-02-21 RX ADMIN — INSULIN ASPART 30 UNITS: 100 INJECTION, SOLUTION INTRAVENOUS; SUBCUTANEOUS at 12:54

## 2019-02-21 RX ADMIN — INSULIN ASPART 30 UNITS: 100 INJECTION, SOLUTION INTRAVENOUS; SUBCUTANEOUS at 17:36

## 2019-02-21 RX ADMIN — SODIUM CHLORIDE 250 ML/HR: 9 INJECTION, SOLUTION INTRAVENOUS at 21:09

## 2019-02-21 RX ADMIN — ATORVASTATIN CALCIUM 80 MG: 40 TABLET, FILM COATED ORAL at 09:42

## 2019-02-21 RX ADMIN — SERTRALINE HYDROCHLORIDE 25 MG: 25 TABLET ORAL at 21:10

## 2019-02-21 RX ADMIN — PRAMIPEXOLE DIHYDROCHLORIDE 1 MG: 1 TABLET ORAL at 21:10

## 2019-02-21 RX ADMIN — HYDROCODONE BITARTRATE AND ACETAMINOPHEN 1 TABLET: 10; 325 TABLET ORAL at 11:05

## 2019-02-21 RX ADMIN — AMLODIPINE BESYLATE 10 MG: 10 TABLET ORAL at 21:10

## 2019-02-21 RX ADMIN — FLUTICASONE PROPIONATE 2 SPRAY: 50 SPRAY, METERED NASAL at 09:42

## 2019-02-21 RX ADMIN — RIVAROXABAN 20 MG: 10 TABLET, FILM COATED ORAL at 17:36

## 2019-02-21 RX ADMIN — PREDNISONE 20 MG: 20 TABLET ORAL at 09:42

## 2019-02-21 RX ADMIN — INSULIN DETEMIR 25 UNITS: 100 INJECTION, SOLUTION SUBCUTANEOUS at 09:26

## 2019-02-21 RX ADMIN — INSULIN ASPART 2 UNITS: 100 INJECTION, SOLUTION INTRAVENOUS; SUBCUTANEOUS at 12:54

## 2019-02-21 RX ADMIN — ACETAMINOPHEN 650 MG: 325 TABLET, FILM COATED ORAL at 23:07

## 2019-02-21 RX ADMIN — SODIUM CHLORIDE 250 ML/HR: 9 INJECTION, SOLUTION INTRAVENOUS at 13:20

## 2019-02-21 RX ADMIN — LISINOPRIL 40 MG: 40 TABLET ORAL at 21:10

## 2019-02-21 RX ADMIN — ONDANSETRON 4 MG: 4 TABLET, ORALLY DISINTEGRATING ORAL at 06:44

## 2019-02-21 RX ADMIN — SODIUM CHLORIDE 250 ML/HR: 9 INJECTION, SOLUTION INTRAVENOUS at 17:37

## 2019-02-21 RX ADMIN — FENOFIBRATE 145 MG: 145 TABLET ORAL at 09:42

## 2019-02-21 RX ADMIN — BUDESONIDE AND FORMOTEROL FUMARATE DIHYDRATE 2 PUFF: 160; 4.5 AEROSOL RESPIRATORY (INHALATION) at 10:25

## 2019-02-21 NOTE — PLAN OF CARE
Problem: Patient Care Overview  Goal: Plan of Care Review  Outcome: Ongoing (interventions implemented as appropriate)   02/21/19 9270   Coping/Psychosocial   Plan of Care Reviewed With patient   Plan of Care Review   Progress no change   OTHER   Outcome Summary patient's urine output low and urine dark       Problem: Cardiac: ACS (Acute Coronary Syndrome) (Adult)  Goal: Signs and Symptoms of Listed Potential Problems Will be Absent, Minimized or Managed (Cardiac: ACS)  Outcome: Ongoing (interventions implemented as appropriate)      Problem: Pain, Chronic (Adult)  Goal: Identify Related Risk Factors and Signs and Symptoms  Outcome: Ongoing (interventions implemented as appropriate)      Problem: Diabetes, Type 2 (Adult)  Goal: Signs and Symptoms of Listed Potential Problems Will be Absent, Minimized or Managed (Diabetes, Type 2)  Outcome: Ongoing (interventions implemented as appropriate)

## 2019-02-21 NOTE — PROGRESS NOTES
FAMILY MEDICINE DAILY PROGRESS NOTE  NAME: Yordy Hansen  : 1980  MRN: [id number]     LOS: 0 days     PROVIDER OF SERVICE: Santosh Newman MD    Chief Complaint: Chest pain, rule out acute myocardial infarction    Subjective:     Interval History:  History taken from: patient  Patient not having any chest pain this morning or difficulty breathing.  He is complaining of mild to moderate nausea but not necessarily any stomach epigastric pain.  No other complaints this morning.  He is thirsty and requesting something that he can sip on.    Review of Systems:   Review of Systems   Constitutional: Negative for activity change, appetite change, chills and fever.   HENT: Negative for congestion, ear pain and sore throat.    Eyes: Negative for redness and visual disturbance.   Respiratory: Negative for cough, shortness of breath and wheezing.    Cardiovascular: Negative for chest pain and palpitations.   Gastrointestinal: Positive for nausea. Negative for abdominal pain, diarrhea and vomiting.   Genitourinary: Negative for difficulty urinating and dysuria.   Musculoskeletal: Negative for arthralgias and gait problem.   Skin: Negative for color change and rash.   Neurological: Negative for dizziness, weakness and headaches.   Psychiatric/Behavioral: Negative for dysphoric mood and sleep disturbance. The patient is not nervous/anxious.        Objective:     Vital Signs  Temp:  [97.2 °F (36.2 °C)-98.4 °F (36.9 °C)] 98.4 °F (36.9 °C)  Heart Rate:  [55-77] 66  Resp:  [18-20] 18  BP: (107-142)/(51-81) 107/59    Physical Exam  Physical Exam   Constitutional: He is oriented to person, place, and time. He appears well-developed and well-nourished.   HENT:   Head: Normocephalic and atraumatic.   Right Ear: External ear normal.   Left Ear: External ear normal.   Nose: Nose normal.   Eyes: Conjunctivae and EOM are normal. Pupils are equal, round, and reactive to light.   Neck: Normal range of motion. Neck supple.    Cardiovascular: Normal rate, regular rhythm and normal heart sounds.   Pulmonary/Chest: Effort normal and breath sounds normal. He has no wheezes.   Abdominal: Soft. Bowel sounds are normal. He exhibits no distension. There is tenderness (epigastric).   Musculoskeletal: Normal range of motion. He exhibits no edema or deformity.   Neurological: He is alert and oriented to person, place, and time. No cranial nerve deficit.   Skin: Skin is warm.   Psychiatric: He has a normal mood and affect. His behavior is normal. Thought content normal.   Nursing note and vitals reviewed.      Medication Review    Current Facility-Administered Medications:   •  acetaminophen (TYLENOL) tablet 650 mg, 650 mg, Oral, Q4H PRN, Earnest Quintero III, MD, 650 mg at 02/20/19 1545  •  albuterol (PROVENTIL) nebulizer solution 0.083% 2.5 mg/3mL, 1.25 mg, Nebulization, Q6H PRN, Earnest Quintero III, MD  •  amLODIPine (NORVASC) tablet 10 mg, 10 mg, Oral, Nightly, Earnest Quintero III, MD, 10 mg at 02/20/19 2043  •  atorvastatin (LIPITOR) tablet 80 mg, 80 mg, Oral, Daily, Earnest Quintero III, MD, 80 mg at 02/21/19 0942  •  budesonide-formoterol (SYMBICORT) 160-4.5 MCG/ACT inhaler 2 puff, 2 puff, Inhalation, BID - RT, Earnest Quintero III, MD, 2 puff at 02/21/19 1025  •  dextrose (D50W) 25 g/ 50mL Intravenous Solution 25 g, 25 g, Intravenous, Q15 Min PRN, Earnest Quintero III, MD  •  dextrose (GLUTOSE) oral gel 15 g, 15 g, Oral, Q15 Min PRN, Earnest Quintero III, MD  •  fenofibrate (TRICOR) tablet 145 mg, 145 mg, Oral, Daily, Earnest Quintero III, MD, 145 mg at 02/21/19 0942  •  fluticasone (FLONASE) 50 MCG/ACT nasal spray 2 spray, 2 spray, Each Nare, Daily, Earnest Quintero III, MD, 2 spray at 02/21/19 0942  •  glucagon (human recombinant) (GLUCAGEN DIAGNOSTIC) injection 1 mg, 1 mg, Subcutaneous, PRN, Earnest Quintero III, MD  •  HYDROcodone-acetaminophen (NORCO)  MG per tablet 1 tablet, 1 tablet, Oral, Q6H PRN, Newman,  Santosh Lott MD, 1 tablet at 02/21/19 1105  •  insulin aspart (novoLOG) injection 0-9 Units, 0-9 Units, Subcutaneous, 4x Daily AC & at Bedtime, Earnest Quintero III, MD, 6 Units at 02/20/19 2044  •  insulin aspart (novoLOG) injection 30 Units, 30 Units, Subcutaneous, TID AC, Earnest Quintero III, MD, 30 Units at 02/20/19 1647  •  insulin detemir (LEVEMIR) injection 25 Units, 25 Units, Subcutaneous, QAM, Newman, Santosh Lott MD, 25 Units at 02/21/19 0926  •  isosorbide mononitrate (IMDUR) 24 hr tablet 120 mg, 120 mg, Oral, Nightly, Earnest Quintero III, MD, 120 mg at 02/20/19 2044  •  lisinopril (PRINIVIL,ZESTRIL) tablet 40 mg, 40 mg, Oral, Nightly, Earnest Quintero III, MD, 40 mg at 02/20/19 2043  •  nebivolol (BYSTOLIC) tablet 5 mg, 5 mg, Oral, Q24H, Earnest Quintero III, MD, 5 mg at 02/21/19 0942  •  nitroglycerin (NITROSTAT) SL tablet 0.4 mg, 0.4 mg, Sublingual, Q5 Min PRN, Earnest Quintero III, MD  •  nystatin (MYCOSTATIN) powder, , Topical, BID, Earnest Quintero III, MD  •  ondansetron ODT (ZOFRAN-ODT) disintegrating tablet 4 mg, 4 mg, Oral, Q6H PRN, Earnest Quintero III, MD, 4 mg at 02/21/19 0644  •  pantoprazole (PROTONIX) EC tablet 40 mg, 40 mg, Oral, Nightly, Earnest Quintero III, MD, 40 mg at 02/20/19 2043  •  pramipexole (MIRAPEX) tablet 1 mg, 1 mg, Oral, Nightly, Earnest Quintero III, MD, 1 mg at 02/20/19 2044  •  prasugrel (EFFIENT) tablet 10 mg, 10 mg, Oral, Daily, Earnest Quintero III, MD, 10 mg at 02/21/19 0942  •  predniSONE (DELTASONE) tablet 20 mg, 20 mg, Oral, Daily With Breakfast, Earnest Quintero III, MD, 20 mg at 02/21/19 0942  •  promethazine (PHENERGAN) tablet 12.5 mg, 12.5 mg, Oral, Q6H PRN, 12.5 mg at 02/21/19 1107 **OR** promethazine (PHENERGAN) injection 12.5 mg, 12.5 mg, Intramuscular, Q6H PRN **OR** promethazine (PHENERGAN) suppository 12.5 mg, 12.5 mg, Rectal, Q6H PRN, Santosh Newman MD  •  ranolazine (RANEXA) 12 hr tablet 1,000 mg, 1,000 mg,  Oral, Q12H, Earnest Quintero III, MD, 1,000 mg at 02/21/19 0942  •  rivaroxaban (XARELTO) tablet 20 mg, 20 mg, Oral, Daily With Dinner, Earnest Quintero III, MD, 20 mg at 02/20/19 1648  •  sertraline (ZOLOFT) tablet 25 mg, 25 mg, Oral, Nightly, Earnest Quintero III, MD, 25 mg at 02/20/19 2043  •  sodium chloride 0.9 % flush 10 mL, 10 mL, Intravenous, PRN, Simon Roldan MD  •  sodium chloride 0.9 % flush 3 mL, 3 mL, Intravenous, Q12H, Earnest Quintero III, MD, 3 mL at 02/20/19 2044  •  sodium chloride 0.9 % flush 3-10 mL, 3-10 mL, Intravenous, PRN, Earnest Quintero III, MD  •  sodium chloride 0.9 % infusion, 250 mL/hr, Intravenous, Continuous, Santosh Newman MD, Last Rate: 250 mL/hr at 02/21/19 1006, 250 mL/hr at 02/21/19 1006  •  traZODone (DESYREL) tablet 300 mg, 300 mg, Oral, Nightly, Earnest Quintero III, MD, 300 mg at 02/20/19 2043     Diagnostic Data    Lab Results (last 24 hours)     Procedure Component Value Units Date/Time    POC Glucose Once [[id number]]  (Abnormal) Collected:  02/21/19 1008    Specimen:  Blood Updated:  02/21/19 1031     Glucose 173 mg/dL      Comment: RN NotifiedOperator: [id number] ARPAN ARIASAHMeter ID: [id number]       POC Glucose Once [[id number]]  (Abnormal) Collected:  02/21/19 0552    Specimen:  Blood Updated:  02/21/19 0644     Glucose 156 mg/dL      Comment: RN NotifiedOperator: [id number] DANIELLE NIEVESFERMeter ID: [id number]       Comprehensive Metabolic Panel [[id number]]  (Abnormal) Collected:  02/21/19 0525    Specimen:  Blood Updated:  02/21/19 0602     Glucose 130 mg/dL      BUN 11 mg/dL      Creatinine 1.61 mg/dL      Sodium 135 mmol/L      Potassium 4.3 mmol/L      Chloride 100 mmol/L      CO2 27.0 mmol/L      Calcium 9.0 mg/dL      Total Protein 6.6 g/dL      Albumin 3.50 g/dL      ALT (SGPT) 28 U/L      AST (SGOT) 23 U/L      Alkaline Phosphatase 61 U/L      Total Bilirubin 0.7 mg/dL      eGFR Non African Amer 48 mL/min/1.73      Globulin 3.1 gm/dL       A/G Ratio 1.1 g/dL      BUN/Creatinine Ratio 6.8     Anion Gap 8.0 mmol/L     CBC Auto Differential [[id number]]  (Abnormal) Collected:  02/21/19 0525    Specimen:  Blood Updated:  02/21/19 0552     WBC 11.97 10*3/mm3      RBC 4.67 10*6/mm3      Hemoglobin 12.7 g/dL      Hematocrit 39.3 %      MCV 84.2 fL      MCH 27.2 pg      MCHC 32.3 g/dL      RDW 15.0 %      RDW-SD 45.9 fl      MPV 9.1 fL      Platelets 212 10*3/mm3      Neutrophil % 72.4 %      Lymphocyte % 13.3 %      Monocyte % 9.4 %      Eosinophil % 2.8 %      Basophil % 0.3 %      Immature Grans % 1.8 %      Neutrophils, Absolute 8.67 10*3/mm3      Lymphocytes, Absolute 1.59 10*3/mm3      Monocytes, Absolute 1.13 10*3/mm3      Eosinophils, Absolute 0.33 10*3/mm3      Basophils, Absolute 0.04 10*3/mm3      Immature Grans, Absolute 0.21 10*3/mm3      nRBC 0.0 /100 WBC     POC Glucose Once [[id number]]  (Abnormal) Collected:  02/20/19 1927    Specimen:  Blood Updated:  02/20/19 2143     Glucose 265 mg/dL      Comment: RN NotifiedOperator: [id number] DANIELLE NIEVESFERMeter ID: [id number]       Lipase [[id number]]  (Abnormal) Collected:  02/20/19 1017    Specimen:  Blood Updated:  02/20/19 1354     Lipase 1,115 U/L     Amylase [[id number]]  (Normal) Collected:  02/20/19 1017    Specimen:  Blood Updated:  02/20/19 1354     Amylase 104 U/L             I reviewed the patient's new clinical results.    Assessment/Plan:     Active Hospital Problems    Diagnosis Date Noted   • **Idiopathic acute pancreatitis [K85.00] 02/20/2019     Patient with elevated lipase of 1100, history of elevated triglyceridemia, patient with added vibrate therapy to statin therapy.  Adding CT abdomen pelvis and making patient n.p.o. and advancing diet as tolerated with chest pain versus abdominal pain.  -CT abdomen and pelvis with contrast bracket following CTA of the chest for PE  -N.p.o. diet will advance diet slowly per pancreatitis protocol  -Pain management with morphine as part of BENITO  protocol for chest pain  - Daily CMP  --IVF at 250     • Chest pain, rule out acute myocardial infarction [R07.9] 09/30/2018     Patient with substernal chest pressure 5/10, patient been noncompliant with many medications running out 1 month previous.  This includes his direct oral anticoagulant of rivaroxaban.  In the ED received aspirin, nitroglycerin which alleviated some chest pain.  Most likely pleuritic versus abdominal in origin.  Does not change with food or position or palpation.  Differential includes pain from pulmonary embolism, acute coronary syndrome, biliary disease, pancreatic disease, pleuritic chest pain.  --troponins-negative  - No significant EKG changes  -Continue BENITO therapy  -Rivaroxaban 20 mg p.o. every afternoon  -CT angiogram of chest- negative     • CAD (coronary artery disease) [I25.10] 03/30/2018     Patient previously noncompliant with double stenting of LAD.  Resuming medically optimized medications.  Aspirin therapy being held with direct oral anticoagulation combined with antiplatelet agent.  -Atorvastatin 80 mg p.o. daily  -Fenofibrate 145 mg p.o. daily  -Isosorbide mononitrate 120 mg p.o. nightly  -Lisinopril 40 mg p.o. nightly  - Nebivolol 5 mg p.o. daily  - Prasugrel 10 mg p.o. daily  -Ranolazine 1000 mg p.o. twice daily     • Type 2 diabetes mellitus without complication, with long-term current use of insulin (CMS/LTAC, located within [facility]) [E11.9, Z79.4] 03/30/2018     Patient on profound a.m. long-acting degludec insulin.  Patient also on pre-meal insulin.  Patient also on GLP-1 agonist.  Will hold GLP-1 agonist and replace with short acting insulin sliding scale.  -Insulin detemir 75 units every morning  - Insulin aspart 30 units before meals  -Insulin aspart sliding scale moderate to high dose  - POC glucose before meals and at bedtime     • Class 3 severe obesity due to excess calories with serious comorbidity and body mass index (BMI) of 60.0 to 69.9 in adult (CMS/LTAC, located within [facility]) [E66.01, Z68.44]  03/08/2018     Body mass index is 62.94 kg/m².  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  Is contributing to history of multiple bilateral pulmonary emboli.  Patient has been off anticoagulation for 1 month, resuming.  -Rivaroxaban 20 mg p.o. q. p.m.     • Chronic pulmonary embolism (CMS/HCC) [I27.82] 04/22/2017     Patient with CTA chest positive 12/2017 with bilateral pulmonary emboli.  Patient has frequent history of CTA chests.  Patient however has been off on anticoagulation for greater than a months time. Chest pain unclear if pulmonary in origin.  D-dimer elevated in the emergency department.  -CTA of chest, negative  -Rivaroxaban 20 mg p.o. q. p.m.     • Essential hypertension [I10]      Patient on multiple antihypertensives.  Patient without hypertensive urgency/emergency.  Will resume home medications and trend blood pressures per hospital protocol.  - Amlodipine 10 mg p.o. nightly  -Isosorbide mononitrate 120 milligrams p.o. nightly  - Lisinopril 40 mg p.o. nightly  -Nebivolol 5 mg p.o. daily     • GERD (gastroesophageal reflux disease) [K21.9]      Resuming home medications.  -Pantoprazole 40 mg p.o. every morning     • RLS (restless legs syndrome) [G25.81]      History of restless leg syndrome resuming home medications.  -Pramipexole 1 mg p.o. nightly     • ADOLFO on CPAP [G47.33, Z99.89] 02/17/2016     Patient with obesity hypoventilation syndrome and obstructive sleep apnea on CPAP at at bedtime.  -Respiratory therapy for CPAP.     • Recurrent major depressive disorder, in partial remission (CMS/HCC) [F33.41] 11/24/2009     Patient with history of psychogenic insomnia.  Currently not in exacerbation resuming home medications.  - Sertraline 25 mg p.o. nightly  -Trazodone 300 mg p.o. nightly         DVT prophylaxis: xarelto  Code status is   Code Status and Medical Interventions:   Ordered at: 02/20/19 1126     Level  Of Support Discussed With:    Patient     Code Status:    CPR     Medical Interventions (Level of Support Prior to Arrest):    Full       Plan for disposition:home when tolerating diet           This document has been electronically signed by Santosh Newman MD on February 21, 2019 12:49 PM

## 2019-02-21 NOTE — PLAN OF CARE
Problem: Patient Care Overview  Goal: Plan of Care Review  Outcome: Ongoing (interventions implemented as appropriate)   02/21/19 0525   Coping/Psychosocial   Plan of Care Reviewed With patient   Plan of Care Review   Progress no change       Problem: Cardiac: ACS (Acute Coronary Syndrome) (Adult)  Goal: Signs and Symptoms of Listed Potential Problems Will be Absent, Minimized or Managed (Cardiac: ACS)  Outcome: Ongoing (interventions implemented as appropriate)      Problem: Pain, Chronic (Adult)  Goal: Identify Related Risk Factors and Signs and Symptoms  Outcome: Ongoing (interventions implemented as appropriate)    Goal: Acceptable Pain/Comfort Level and Functional Ability  Outcome: Ongoing (interventions implemented as appropriate)      Problem: Diabetes, Type 2 (Adult)  Goal: Signs and Symptoms of Listed Potential Problems Will be Absent, Minimized or Managed (Diabetes, Type 2)  Outcome: Ongoing (interventions implemented as appropriate)

## 2019-02-21 NOTE — CONSULTS
Adult Nutrition  Assessment    Patient Name:  Yordy Hansen  YOB: 1980  MRN: [id number]  Admit Date:  2/20/2019    Assessment Date:  2/21/2019    Comments:  Pt presents at BMI 61 and 256% IBW which is compatible w/morbid obesity. Pt known to RD from numerous previous visits and pt has been educated re: weight loss/healthy eating several times. Currently admitted r/t chest pain. Pt is NPO and resting w/O2 mask at time of RD visit. RD will monitor hospital course and offer education when more appropriate.     Reason for Assessment     Row Name 02/21/19 0955          Reason for Assessment    Reason For Assessment  identified at risk by screening criteria     Identified At Risk by Screening Criteria  BMI         Nutrition/Diet History     Row Name 02/21/19 0956          Nutrition/Diet History    Typical Food/Fluid Intake  Pt resting w/O2 mask on. RD did not attempt ed at this time.         Anthropometrics     Row Name 02/21/19 0647          Anthropometrics    Weight  200 kg (442 lb)  (Abnormal)            Physical Findings     Row Name 02/21/19 0958          Physical Findings    Overall Physical Appearance  obese;on oxygen therapy           Nutrition Prescription Ordered     Row Name 02/21/19 0958          Nutrition Prescription PO    Current PO Diet  NPO                 Electronically signed by:  Afia Chen RD  02/21/19 10:00 AM

## 2019-02-21 NOTE — PROGRESS NOTES
Discharge Planning Assessment  [facility]     Patient Name: Yordy Hansen  MRN: [id number]  Today's Date: 2/21/2019    Admit Date: 2/20/2019    Discharge Needs Assessment     Row Name 02/21/19 1354       Living Environment    Lives With  spouse    Name(s) of Who Lives With Patient  Cori    Current Living Arrangements  home/apartment/condo    Primary Care Provided by  self    Provides Primary Care For  no one    Family Caregiver if Needed  spouse    Quality of Family Relationships  helpful;involved;supportive    Able to Return to Prior Arrangements  yes       Resource/Environmental Concerns    Transportation Concerns  car, none       Transition Planning    Patient/Family Anticipates Transition to  home with family    Transportation Anticipated  car, drives self;family or friend will provide       Discharge Needs Assessment    Readmission Within the Last 30 Days  no previous admission in last 30 days    Concerns to be Addressed  patient refuses services;compliance issue    Equipment Currently Used at Home  bipap/cpap;glucometer;nebulizer    Anticipated Changes Related to Illness  none    Equipment Needed After Discharge  none    Offered/Gave Vendor List  no    Patient's Choice of Community Agency(s)  Patient does not want .    Current Discharge Risk  --        Discharge Plan     Row Name 02/21/19 6095       Plan    Plan Comments  Demographics and pharmacy, [facility] verified. Patients reports rx coverage. When asked why he stopped taking medications at first he said he couldn't afford d/t higher electric bill. When asked what copay where and if anything we could do to help with medications, patient said they are affordable, he just quit taking everything except his restless leg and sleep medications because that is the only medications that he knows helps him. Discussed the importance of all medications and patient voiced understanding and stated he just needs to get back on schedule. He is  independent with adl's and still drives.         Destination      No service coordination in this encounter.      Durable Medical Equipment      No service coordination in this encounter.      Dialysis/Infusion      No service coordination in this encounter.      Home Medical Care      No service coordination in this encounter.      Community Resources      No service coordination in this encounter.        Expected Discharge Date and Time     Expected Discharge Date Expected Discharge Time    Feb 22, 2019         Demographic Summary     Row Name 02/21/19 1352       General Information    Admission Type  observation    Arrived From  home    Required Notices Provided  Observation Status Notice    Referral Source  high risk screening    Preferred Language  English       Contact Information    Contact Information Obtained for          Functional Status     Row Name 02/21/19 1352       Functional Status    Usual Activity Tolerance  moderate    Current Activity Tolerance  moderate       Functional Status, IADL    Medications  independent    Meal Preparation  independent    Housekeeping  independent    Laundry  independent    Shopping  independent       Mental Status    General Appearance WDL  WDL       Mental Status Summary    Recent Changes in Mental Status/Cognitive Functioning  no changes       Employment/    Employment Status  disabled        Psychosocial    No documentation.       Abuse/Neglect    No documentation.       Legal    No documentation.       Substance Abuse    No documentation.       Patient Forms    No documentation.           Cristiane Alvarado

## 2019-02-22 VITALS
HEART RATE: 67 BPM | RESPIRATION RATE: 18 BRPM | TEMPERATURE: 97.3 F | HEIGHT: 71 IN | WEIGHT: 315 LBS | OXYGEN SATURATION: 95 % | BODY MASS INDEX: 44.1 KG/M2 | DIASTOLIC BLOOD PRESSURE: 67 MMHG | SYSTOLIC BLOOD PRESSURE: 144 MMHG

## 2019-02-22 PROBLEM — K85.00 IDIOPATHIC ACUTE PANCREATITIS: Status: RESOLVED | Noted: 2019-02-20 | Resolved: 2019-02-22

## 2019-02-22 PROBLEM — R07.9 CHEST PAIN, RULE OUT ACUTE MYOCARDIAL INFARCTION: Status: RESOLVED | Noted: 2018-09-30 | Resolved: 2019-02-22

## 2019-02-22 LAB
ALBUMIN SERPL-MCNC: 3.2 G/DL (ref 3.4–4.8)
ALBUMIN/GLOB SERPL: 1.1 G/DL (ref 1.1–1.8)
ALP SERPL-CCNC: 56 U/L (ref 38–126)
ALT SERPL W P-5'-P-CCNC: 23 U/L (ref 21–72)
ANION GAP SERPL CALCULATED.3IONS-SCNC: 6 MMOL/L (ref 5–15)
AST SERPL-CCNC: 17 U/L (ref 17–59)
BASOPHILS # BLD AUTO: 0.04 10*3/MM3 (ref 0–0.2)
BASOPHILS NFR BLD AUTO: 0.5 % (ref 0–1.5)
BILIRUB SERPL-MCNC: 0.3 MG/DL (ref 0.2–1.3)
BUN BLD-MCNC: 11 MG/DL (ref 7–21)
BUN/CREAT SERPL: 16.9 (ref 7–25)
CALCIUM SPEC-SCNC: 8.6 MG/DL (ref 8.4–10.2)
CHLORIDE SERPL-SCNC: 102 MMOL/L (ref 95–110)
CO2 SERPL-SCNC: 26 MMOL/L (ref 22–31)
CREAT BLD-MCNC: 0.65 MG/DL (ref 0.7–1.3)
DEPRECATED RDW RBC AUTO: 46.3 FL (ref 37–54)
EOSINOPHIL # BLD AUTO: 0.27 10*3/MM3 (ref 0–0.4)
EOSINOPHIL NFR BLD AUTO: 3.3 % (ref 0.3–6.2)
ERYTHROCYTE [DISTWIDTH] IN BLOOD BY AUTOMATED COUNT: 15.1 % (ref 12.3–15.4)
GFR SERPL CREATININE-BSD FRML MDRD: 136 ML/MIN/1.73 (ref 63–147)
GLOBULIN UR ELPH-MCNC: 2.8 GM/DL (ref 2.3–3.5)
GLUCOSE BLD-MCNC: 181 MG/DL (ref 60–100)
GLUCOSE BLDC GLUCOMTR-MCNC: 161 MG/DL (ref 70–130)
GLUCOSE BLDC GLUCOMTR-MCNC: 177 MG/DL (ref 70–130)
GLUCOSE BLDC GLUCOMTR-MCNC: 255 MG/DL (ref 70–130)
HCT VFR BLD AUTO: 34.5 % (ref 37.5–51)
HGB BLD-MCNC: 11.2 G/DL (ref 13–17.7)
IMM GRANULOCYTES # BLD AUTO: 0.13 10*3/MM3 (ref 0–0.05)
IMM GRANULOCYTES NFR BLD AUTO: 1.6 % (ref 0–0.5)
LYMPHOCYTES # BLD AUTO: 2.11 10*3/MM3 (ref 0.7–3.1)
LYMPHOCYTES NFR BLD AUTO: 25.5 % (ref 19.6–45.3)
MCH RBC QN AUTO: 27.5 PG (ref 26.6–33)
MCHC RBC AUTO-ENTMCNC: 32.5 G/DL (ref 31.5–35.7)
MCV RBC AUTO: 84.6 FL (ref 79–97)
MONOCYTES # BLD AUTO: 0.77 10*3/MM3 (ref 0.1–0.9)
MONOCYTES NFR BLD AUTO: 9.3 % (ref 5–12)
NEUTROPHILS # BLD AUTO: 4.95 10*3/MM3 (ref 1.4–7)
NEUTROPHILS NFR BLD AUTO: 59.8 % (ref 42.7–76)
NRBC BLD AUTO-RTO: 0 /100 WBC (ref 0–0)
PLATELET # BLD AUTO: 203 10*3/MM3 (ref 140–450)
PMV BLD AUTO: 9.5 FL (ref 6–12)
POTASSIUM BLD-SCNC: 4 MMOL/L (ref 3.5–5.1)
PROT SERPL-MCNC: 6 G/DL (ref 6.3–8.6)
RBC # BLD AUTO: 4.08 10*6/MM3 (ref 4.14–5.8)
SODIUM BLD-SCNC: 134 MMOL/L (ref 137–145)
WBC NRBC COR # BLD: 8.27 10*3/MM3 (ref 3.4–10.8)

## 2019-02-22 PROCEDURE — 94799 UNLISTED PULMONARY SVC/PX: CPT

## 2019-02-22 PROCEDURE — G0378 HOSPITAL OBSERVATION PER HR: HCPCS

## 2019-02-22 PROCEDURE — 63710000001 INSULIN DETEMIR PER 5 UNITS: Performed by: STUDENT IN AN ORGANIZED HEALTH CARE EDUCATION/TRAINING PROGRAM

## 2019-02-22 PROCEDURE — 80053 COMPREHEN METABOLIC PANEL: CPT | Performed by: STUDENT IN AN ORGANIZED HEALTH CARE EDUCATION/TRAINING PROGRAM

## 2019-02-22 PROCEDURE — 82962 GLUCOSE BLOOD TEST: CPT

## 2019-02-22 PROCEDURE — 94760 N-INVAS EAR/PLS OXIMETRY 1: CPT

## 2019-02-22 PROCEDURE — 85025 COMPLETE CBC W/AUTO DIFF WBC: CPT | Performed by: STUDENT IN AN ORGANIZED HEALTH CARE EDUCATION/TRAINING PROGRAM

## 2019-02-22 PROCEDURE — 63710000001 PREDNISONE PER 1 MG: Performed by: STUDENT IN AN ORGANIZED HEALTH CARE EDUCATION/TRAINING PROGRAM

## 2019-02-22 PROCEDURE — 63710000001 INSULIN ASPART PER 5 UNITS: Performed by: STUDENT IN AN ORGANIZED HEALTH CARE EDUCATION/TRAINING PROGRAM

## 2019-02-22 PROCEDURE — 96361 HYDRATE IV INFUSION ADD-ON: CPT

## 2019-02-22 RX ADMIN — FENOFIBRATE 145 MG: 145 TABLET ORAL at 08:29

## 2019-02-22 RX ADMIN — INSULIN ASPART 2 UNITS: 100 INJECTION, SOLUTION INTRAVENOUS; SUBCUTANEOUS at 08:30

## 2019-02-22 RX ADMIN — PRASUGREL HYDROCHLORIDE 10 MG: 10 TABLET, FILM COATED ORAL at 08:29

## 2019-02-22 RX ADMIN — INSULIN ASPART 30 UNITS: 100 INJECTION, SOLUTION INTRAVENOUS; SUBCUTANEOUS at 10:50

## 2019-02-22 RX ADMIN — ATORVASTATIN CALCIUM 80 MG: 40 TABLET, FILM COATED ORAL at 08:29

## 2019-02-22 RX ADMIN — NYSTATIN: 100000 POWDER TOPICAL at 08:30

## 2019-02-22 RX ADMIN — INSULIN ASPART 2 UNITS: 100 INJECTION, SOLUTION INTRAVENOUS; SUBCUTANEOUS at 10:50

## 2019-02-22 RX ADMIN — SODIUM CHLORIDE 250 ML/HR: 9 INJECTION, SOLUTION INTRAVENOUS at 03:09

## 2019-02-22 RX ADMIN — BUDESONIDE AND FORMOTEROL FUMARATE DIHYDRATE 2 PUFF: 160; 4.5 AEROSOL RESPIRATORY (INHALATION) at 06:50

## 2019-02-22 RX ADMIN — INSULIN DETEMIR 25 UNITS: 100 INJECTION, SOLUTION SUBCUTANEOUS at 06:27

## 2019-02-22 RX ADMIN — FLUTICASONE PROPIONATE 2 SPRAY: 50 SPRAY, METERED NASAL at 08:30

## 2019-02-22 RX ADMIN — PREDNISONE 20 MG: 20 TABLET ORAL at 08:29

## 2019-02-22 RX ADMIN — INSULIN ASPART 30 UNITS: 100 INJECTION, SOLUTION INTRAVENOUS; SUBCUTANEOUS at 08:29

## 2019-02-22 RX ADMIN — RANOLAZINE 1000 MG: 500 TABLET, FILM COATED, EXTENDED RELEASE ORAL at 08:29

## 2019-02-22 NOTE — DISCHARGE SUMMARY
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   • Chronic pulmonary embolism (CMS/HCC) [I27.82] 2017   • Essential hypertension [I10]    • GERD (gastroesophageal reflux disease) [K21.9]    • RLS (restless legs syndrome) [G25.81]    • ADOLFO on CPAP [G47.33, Z99.89] 2016   • Recurrent major depressive disorder, in partial remission (CMS/[facility]) [F33.41] 2009      Resolved Hospital Problems    Diagnosis Date Noted Date Resolved   • **Idiopathic acute pancreatitis [K85.00] 2019   • Chest pain, rule out acute myocardial infarction [R07.9] 2018   • Mixed hyperlipidemia [E78.2] 2016     DISCHARGE DIAGNOSES:   Active Hospital Problems    Diagnosis Date Noted   • CAD (coronary artery disease) [I25.10] 2018   • Type 2 diabetes mellitus without complication, with long-term current use of insulin (CMS/[facility]) [E11.9, Z79.4] 2018   • Class 3 severe obesity due to excess calories with serious comorbidity and body mass index (BMI) of 60.0 to 69.9 in adult (CMS/[facility]) [E66.01, Z68.44] 2018   • 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   • Chronic pulmonary embolism (CMS/HCC) [I27.82] 2017   • Essential hypertension [I10]    • GERD (gastroesophageal reflux disease) [K21.9]    • RLS (restless legs syndrome) [G25.81]    • ADOLFO on CPAP [G47.33, Z99.89]  02/17/2016   • Recurrent major depressive disorder, in partial remission (CMS/HCC) [F33.41] 11/24/2009      Resolved Hospital Problems    Diagnosis Date Noted Date Resolved   • **Idiopathic acute pancreatitis [K85.00] 02/20/2019 02/22/2019   • Chest pain, rule out acute myocardial infarction [R07.9] 09/30/2018 02/22/2019   • Mixed hyperlipidemia [E78.2] 12/13/2016 02/20/2019       SERVICE: Family Medicine.  Attending: Dr Castro  Resident: Santosh Newman MD    CONSULTS:   Consult Orders (all) (From admission, onward)    Start     Ordered    02/20/19 1816  Inpatient Nutrition Consult  Once     Provider:  (Not yet assigned)    02/20/19 1816          PROCEDURES:   none    HISTORY OF PRESENT ILLNESS:   (taken from Dr Quintero's H&P)    Yordy Hansen is a 38 y.o. male who presents with one day of chest pain. Located substernally, had been radiating to L shoulder. Describes it as smothering and sharp. Pain is currently 7/10. Associated with nausea.  Patient with history of recent travel to [place] and [facility].  Travel occurred amidst chest pain.     Patient has associated nausea, also has been recently diagnosed with hypertriglyceridemia and placed on 5 bradycardia in addition to statin therapy.     Patient running out of home medications with only trazodone, medication for restless leg syndrome and new fenofibrate as only active meds he was currently taking prior to admission.     In emergency department patient treated and evaluated for typical chest pain/unstable angina BENITO therapy resolved chest pain.  No significant EKG changes and troponins negative x1.  Presentation and CT angiogram with profound history of multiple CT angiograms in the past.  Patient was admitted to floor for further observation and treatment.      DIAGNOSTIC DATA:   Lab Results (last 24 hours)     Procedure Component Value Units Date/Time    POC Glucose Once [[id number]]  (Abnormal) Collected:  02/22/19 1051    Specimen:  Blood Updated:   02/22/19 1114     Glucose 161 mg/dL      Comment: Sliding Scale AdminOperator: [id number] TIP LOPEZMeter ID: [id number]       Comprehensive Metabolic Panel [[id number]]  (Abnormal) Collected:  02/22/19 0925    Specimen:  Blood Updated:  02/22/19 0943     Glucose 181 mg/dL      BUN 11 mg/dL      Creatinine 0.65 mg/dL      Sodium 134 mmol/L      Potassium 4.0 mmol/L      Chloride 102 mmol/L      CO2 26.0 mmol/L      Calcium 8.6 mg/dL      Total Protein 6.0 g/dL      Albumin 3.20 g/dL      ALT (SGPT) 23 U/L      AST (SGOT) 17 U/L      Alkaline Phosphatase 56 U/L      Total Bilirubin 0.3 mg/dL      eGFR Non African Amer 136 mL/min/1.73      Globulin 2.8 gm/dL      A/G Ratio 1.1 g/dL      BUN/Creatinine Ratio 16.9     Anion Gap 6.0 mmol/L     POC Glucose Once [[id number]]  (Abnormal) Collected:  02/22/19 0614    Specimen:  Blood Updated:  02/22/19 0731     Glucose 177 mg/dL      Comment: RN NotifiedOperator: [id number] DANIELLE NIEVESFERMeter ID: [id number]       CBC Auto Differential [[id number]]  (Abnormal) Collected:  02/22/19 0533    Specimen:  Blood Updated:  02/22/19 0613     WBC 8.27 10*3/mm3      RBC 4.08 10*6/mm3      Hemoglobin 11.2 g/dL      Hematocrit 34.5 %      MCV 84.6 fL      MCH 27.5 pg      MCHC 32.5 g/dL      RDW 15.1 %      RDW-SD 46.3 fl      MPV 9.5 fL      Platelets 203 10*3/mm3      Neutrophil % 59.8 %      Lymphocyte % 25.5 %      Monocyte % 9.3 %      Eosinophil % 3.3 %      Basophil % 0.5 %      Immature Grans % 1.6 %      Neutrophils, Absolute 4.95 10*3/mm3      Lymphocytes, Absolute 2.11 10*3/mm3      Monocytes, Absolute 0.77 10*3/mm3      Eosinophils, Absolute 0.27 10*3/mm3      Basophils, Absolute 0.04 10*3/mm3      Immature Grans, Absolute 0.13 10*3/mm3      nRBC 0.0 /100 WBC     POC Glucose Once [[id number]]  (Abnormal) Collected:  02/20/19 1614    Specimen:  Blood Updated:  02/22/19 0053     Glucose 255 mg/dL      Comment: RN NotifiedOperator: [id number] MYRON MELISSAMeter ID:  [id number]       POC Glucose Once [[id number]]  (Abnormal) Collected:  02/21/19 1928    Specimen:  Blood Updated:  02/21/19 2049     Glucose 253 mg/dL      Comment: RN NotifiedOperator: [id number] DANIELLE STAPLESMeter ID: [id number]       POC Glucose Once [[id number]]  (Abnormal) Collected:  02/21/19 1610    Specimen:  Blood Updated:  02/21/19 1705     Glucose 145 mg/dL      Comment: RN NotifiedOperator: [id number] ARPAN ROCHAMeter ID: [id number]       Urinalysis With Microscopic If Indicated (No Culture) - Urine, Clean Catch [[id number]]  (Abnormal) Collected:  02/21/19 1431    Specimen:  Urine, Clean Catch Updated:  02/21/19 1456     Color, UA Yellow     Appearance, UA Clear     pH, UA 6.0     Specific Gravity, UA 1.018     Glucose,  mg/dL (2+)     Ketones, UA Negative     Bilirubin, UA Negative     Blood, UA Negative     Protein, UA 30 mg/dL (1+)     Leuk Esterase, UA Negative     Nitrite, UA Negative     Urobilinogen, UA 1.0 E.U./dL    Urinalysis, Microscopic Only - Urine, Clean Catch [[id number]] Collected:  02/21/19 1431    Specimen:  Urine, Clean Catch Updated:  02/21/19 1456     RBC, UA None Seen /HPF      WBC, UA 0-2 /HPF      Bacteria, UA None Seen /HPF      Squamous Epithelial Cells, UA None Seen /HPF      Hyaline Casts, UA 0-2 /LPF      Methodology Automated Microscopy         Imaging Results (last 72 hours)     Procedure Component Value Units Date/Time    CT Angiogram Chest With Contrast [[id number]] Collected:  02/20/19 1506     Updated:  02/20/19 1600    Narrative:       PROCEDURE: CT CHEST ANGIOGRAPHY WITH IV CONTRAST, CT ABDOMEN  PELVIS WITH IV CONTRAST    CLINICAL INFORMATION:  Chest pain. Nausea and vomiting with  diarrhea.     TECHNIQUE: Axial images were obtained and multiplanar  reconstructions were made.    Dose length product 4862  This exam was performed using radiation doses that are as low as  reasonably achievable (ALARA).  This exam was performed according to our departmental  dose  optimization program, which includes automated exposure control,  adjustment of the mA and/or KV according to patient size and/or  use of iterative reconstruction technique.  CONTRAST:   100 cc intravenous Isovue 370  COMPARISON: CT abdomen and pelvis 9/4/2018. CT chest 12/20/2018.    Note: Reconstructed MIP images were obtained in multiple  obliquities. No 3-D surface rendered images were obtained for  this exam.    FINDINGS:  PULMONARY CTA: Very limited evaluation of the pulmonary arteries  due to streak artifact. The pulmonary trunk and main pulmonary  arteries appear patent. No obvious pulmonary embolism.  OTHER VASCULAR: Unremarkable    LUNGS/PLEURA: The lungs are normal.  TRACHEA AND BRONCHI:  The trachea and bronchi are patent.  MEDIASTINUM, ANDREA AND LYMPH NODES: The mediastinum, andrea and  lymph nodes are normal.  HEART AND PERICARDIUM: The heart and pericardium are normal.  OSSEOUS STRUCTURES: Unremarkable.    Streak artifact in the abdomen limits evaluation.    HEPATOBILIARY: Unremarkable.  SPLEEN: Unremarkable.  PANCREAS: Unremarkable  ADRENAL GLANDS: Unremarkable.  KIDNEYS/URETERS: No evidence of hydronephrosis or suspicious  mass.    GASTROINTESTINAL: Unremarkable  REPRODUCTIVE ORGANS: Unremarkable.  URINARY BLADDER: Unremarkable    VASCULAR: Unremarkable  LYMPH NODES: No pathologically enlarged nodes by size criteria.  PERITONEUM/RETROPERITONEUM: Unremarkable.     OSSEOUS STRUCTURES: Unremarkable.        Impression:       CONCLUSION:   Limited exam due to streak artifact associated with patient's  body habitus.  No obvious pulmonary embolism.  CT of the chest, abdomen and pelvis unremarkable.    Electronically signed by:  Earnest Fish MD  2/20/2019 3:58 PM CST  Workstation: [id number]    CT Abdomen Pelvis With Contrast [[id number]] Collected:  02/20/19 1506     Updated:  02/20/19 1600    Narrative:       PROCEDURE: CT CHEST ANGIOGRAPHY WITH IV CONTRAST, CT ABDOMEN  PELVIS WITH IV CONTRAST    CLINICAL  INFORMATION:  Chest pain. Nausea and vomiting with  diarrhea.     TECHNIQUE: Axial images were obtained and multiplanar  reconstructions were made.    Dose length product 4862  This exam was performed using radiation doses that are as low as  reasonably achievable (ALARA).  This exam was performed according to our departmental dose  optimization program, which includes automated exposure control,  adjustment of the mA and/or KV according to patient size and/or  use of iterative reconstruction technique.  CONTRAST:   100 cc intravenous Isovue 370  COMPARISON: CT abdomen and pelvis 9/4/2018. CT chest 12/20/2018.    Note: Reconstructed MIP images were obtained in multiple  obliquities. No 3-D surface rendered images were obtained for  this exam.    FINDINGS:  PULMONARY CTA: Very limited evaluation of the pulmonary arteries  due to streak artifact. The pulmonary trunk and main pulmonary  arteries appear patent. No obvious pulmonary embolism.  OTHER VASCULAR: Unremarkable    LUNGS/PLEURA: The lungs are normal.  TRACHEA AND BRONCHI:  The trachea and bronchi are patent.  MEDIASTINUM, ANDREA AND LYMPH NODES: The mediastinum, andrea and  lymph nodes are normal.  HEART AND PERICARDIUM: The heart and pericardium are normal.  OSSEOUS STRUCTURES: Unremarkable.    Streak artifact in the abdomen limits evaluation.    HEPATOBILIARY: Unremarkable.  SPLEEN: Unremarkable.  PANCREAS: Unremarkable  ADRENAL GLANDS: Unremarkable.  KIDNEYS/URETERS: No evidence of hydronephrosis or suspicious  mass.    GASTROINTESTINAL: Unremarkable  REPRODUCTIVE ORGANS: Unremarkable.  URINARY BLADDER: Unremarkable    VASCULAR: Unremarkable  LYMPH NODES: No pathologically enlarged nodes by size criteria.  PERITONEUM/RETROPERITONEUM: Unremarkable.     OSSEOUS STRUCTURES: Unremarkable.        Impression:       CONCLUSION:   Limited exam due to streak artifact associated with patient's  body habitus.  No obvious pulmonary embolism.  CT of the chest, abdomen and  pelvis unremarkable.    Electronically signed by:  Earnest Fish MD  2/20/2019 3:58 PM CST  Workstation: [id number]    US Venous Doppler Lower Extremity Bilateral (duplex) [[id number]] Collected:  02/20/19 0909     Updated:  02/20/19 1014    Narrative:         PROCEDURE: Bilateral lower extremity venous duplex    COMPARISON: None    HISTORY: Bilateral lower extremity pain and swelling    FINDINGS: Realtime grayscale and color-flow imaging was performed  of both lower extremities.    The deep veins demonstrate normal compressibility, respiratory  variation and augmentation with distal compression. No thrombus  is identified.      Impression:       CONCLUSION:   No DVT    Electronically signed by:  Earnest Fish MD  2/20/2019 10:13 AM  CST Workstation: [id number]    XR Chest 1 View [[id number]] Collected:  02/20/19 0722     Updated:  02/20/19 0743    Narrative:       Chest single view.     CLINICAL INDICATION: Shortness of breath. Chest pain protocol    COMPARISON: January 27, 2019.    FINDINGS: Cardiac silhouette is normal in size. Pulmonary  vascularity is unremarkable.     No focal infiltrate or consolidation.  No pleural effusion.  No  pneumothorax.  Old healed left rib fractures are identified. No change since  prior exam.        Impression:       CONCLUSION: No evidence of active disease. No change since prior  exam.    Electronically signed by:  Al Rodriguez MD  2/20/2019 7:41 AM CST  Workstation: [id number]            HOSPITAL COURSE:  Patient admitted to medicine floor.  He trended for 3 troponins which were negative.  He had no acute events on telemetry.  CT angiogram of the chest was negative, CT abdomen with no acute changes.  He had quite elevated lipase.  He is given IV fluids, pain medicine, and made n.p.o.  He was slowly started on liquid diet and advanced as tolerated.  By time of discharge he was tolerating full diet, making urine and bowel movements without issue, stable for discharge.    DISCHARGE CONDITION:    stable    DISPOSITION:  Home or Self Care    DISCHARGE MEDICATIONS     Discharge Medications      Changes to Medications      Instructions Start Date   insulin aspart 100 UNIT/ML injection  Commonly known as:  novoLOG  What changed:  how much to take   25 Units, Subcutaneous, 3 Times Daily Before Meals      isosorbide mononitrate 120 MG 24 hr tablet  Commonly known as:  IMDUR  What changed:  when to take this   120 mg, Oral, Daily      lisinopril 40 MG tablet  Commonly known as:  PRINIVIL,ZESTRIL  What changed:  when to take this   40 mg, Oral, Every Morning      rivaroxaban 20 MG tablet  Commonly known as:  XARELTO  What changed:  when to take this   20 mg, Oral, Daily      sertraline 25 MG tablet  Commonly known as:  ZOLOFT  What changed:  when to take this   25 mg, Oral, Daily         Continue These Medications      Instructions Start Date   albuterol sulfate  (90 Base) MCG/ACT inhaler  Commonly known as:  PROVENTIL HFA;VENTOLIN HFA;PROAIR HFA   2 puffs, Inhalation, Every 4 Hours PRN      albuterol 1.25 MG/3ML nebulizer solution  Commonly known as:  ACCUNEB   1 ampule, Nebulization, Every 6 Hours PRN      amLODIPine 10 MG tablet  Commonly known as:  NORVASC   10 mg, Oral, Nightly      atorvastatin 80 MG tablet  Commonly known as:  LIPITOR   80 mg, Oral      azithromycin 250 MG tablet  Commonly known as:  ZITHROMAX   Take 2 tablets the first day, then 1 tablet daily for 4 days.      budesonide-formoterol 160-4.5 MCG/ACT inhaler  Commonly known as:  SYMBICORT   2 puffs, Inhalation, 2 Times Daily - RT      BYSTOLIC 10 MG tablet  Generic drug:  nebivolol   5 mg, 2 Times Daily      cephalexin 500 MG capsule  Commonly known as:  KEFLEX   500 mg, Oral, 4 Times Daily      fenofibrate 145 MG tablet  Commonly known as:  TRICOR   145 mg, Oral, Daily      fluticasone 50 MCG/ACT nasal spray  Commonly known as:  FLONASE   2 sprays, Each Nare, Daily      glucose monitor monitoring kit   1 each, Does not apply, As  "Needed      MICROLET LANCETS misc   One touch delica lancets      nitroglycerin 0.4 MG SL tablet  Commonly known as:  NITROSTAT   0.4 mg, Sublingual, Every 5 Minutes PRN, Take no more than 3 doses in 15 minutes.      nystatin 722173 UNIT/GM powder  Commonly known as:  MYCOSTATIN   Topical, 2 Times Daily      ondansetron ODT 4 MG disintegrating tablet  Commonly known as:  ZOFRAN-ODT   4 mg, Oral, Every 6 Hours PRN      pantoprazole 40 MG EC tablet  Commonly known as:  PROTONIX   40 mg, Oral, Nightly      pramipexole 1 MG tablet  Commonly known as:  MIRAPEX   TAKE TWO TABLETS BY MOUTH EVERY NIGHT      prasugrel 10 MG tablet  Commonly known as:  EFFIENT   10 mg, Oral      predniSONE 20 MG tablet  Commonly known as:  DELTASONE   20 mg, Oral, Daily      ranolazine 1000 MG 12 hr tablet  Commonly known as:  RANEXA   1,000 mg, Oral, Every 12 Hours Scheduled      RELION INSULIN SYRINGE 31G X 15/64\" 0.5 ML misc  Generic drug:  Insulin Syringe-Needle U-100   No dose, route, or frequency recorded.      traZODone 300 MG tablet  Commonly known as:  DESYREL   TAKE ONE TABLET BY MOUTH EVERY NIGHT      TRESIBA FLEXTOUCH 100 UNIT/ML solution pen-injector injection  Generic drug:  insulin degludec   75 Units, Subcutaneous, Nightly      VICTOZA SC   Subcutaneous             INSTRUCTIONS:  Activity:   Activity Instructions     Activity as Tolerated          Diet:   Diet Instructions     Diet: Consistent Carbohydrate, Cardiac      Discharge Diet:   Consistent Carbohydrate  Cardiac           Special instructions: Patient instructed to call MD or return to ED with worsening shortness of breath, chest pain, fever greater than 100.4 degrees F or any other medical concerns..    FOLLOW UP:   Additional Instructions for the Follow-ups that You Need to Schedule     Call MD With Problems / Concerns   As directed      Instructions: call your doctor or go to the ER if you have chest pain, shortness of breath, or fever of 100.4.    Order Comments:  " Instructions: call your doctor or go to the ER if you have chest pain, shortness of breath, or fever of 100.4.          Discharge Follow-up with PCP   As directed       Currently Documented PCP:    Ruslan Gonzalez APRN    PCP Phone Number:    [phone]     Follow Up Details:  3-5 days           Follow-up Information     Ruslan Gonzalez APRN .    Specialty:  Family Medicine  Why:  3-5 days  Contact information:  [address] [place] [address]  [phone]                   PENDING TEST RESULTS AT DISCHARGE        Dr Castro is the attending at time of discharge, He is aware of the patient's status and agrees with the above discharge summary.          This document has been electronically signed by Santosh Newman MD on February 25, 2019 3:04 PM

## 2019-02-22 NOTE — PROGRESS NOTES
FAMILY MEDICINE DAILY PROGRESS NOTE  NAME: Yordy Hansen  : 1980  MRN: [id number]     LOS: 0 days     PROVIDER OF SERVICE: Santosh Newman MD    Chief Complaint: Idiopathic acute pancreatitis    Subjective:     Interval History:  History taken from: patient  Patient feeling much better this morning.  He is on IV fluids all day yesterday and had great urine output.  His abdominal pain is much better.  His nausea also much improved, tolerated liquid diet without any issues yesterday.  No further complaints today.    Review of Systems:   Review of Systems   Constitutional: Negative for activity change, appetite change, chills and fever.   HENT: Negative for congestion, ear pain and sore throat.    Eyes: Negative for redness and visual disturbance.   Respiratory: Negative for cough, shortness of breath and wheezing.    Cardiovascular: Negative for chest pain and palpitations.   Gastrointestinal: Negative for abdominal pain, diarrhea, nausea and vomiting.   Genitourinary: Negative for difficulty urinating and dysuria.   Musculoskeletal: Negative for arthralgias and gait problem.   Skin: Negative for color change and rash.   Neurological: Negative for dizziness, weakness and headaches.   Psychiatric/Behavioral: Negative for dysphoric mood and sleep disturbance. The patient is not nervous/anxious.        Objective:     Vital Signs  Temp:  [96.7 °F (35.9 °C)-97.6 °F (36.4 °C)] 97.3 °F (36.3 °C)  Heart Rate:  [57-97] 67  Resp:  [18] 18  BP: (116-144)/(55-67) 144/67    Physical Exam  Physical Exam   Constitutional: He is oriented to person, place, and time. He appears well-developed and well-nourished.   HENT:   Head: Normocephalic and atraumatic.   Right Ear: External ear normal.   Left Ear: External ear normal.   Nose: Nose normal.   Eyes: Conjunctivae and EOM are normal. Pupils are equal, round, and reactive to light.   Neck: Normal range of motion. Neck supple.   Cardiovascular: Normal rate, regular  rhythm and normal heart sounds.   Pulmonary/Chest: Effort normal and breath sounds normal. He has no wheezes.   Abdominal: Soft. Bowel sounds are normal. He exhibits no distension. There is no tenderness.   Musculoskeletal: Normal range of motion. He exhibits no edema or deformity.   Neurological: He is alert and oriented to person, place, and time. No cranial nerve deficit.   Skin: Skin is warm.   Psychiatric: He has a normal mood and affect. His behavior is normal. Thought content normal.   Nursing note and vitals reviewed.      Medication Review    Current Facility-Administered Medications:   •  acetaminophen (TYLENOL) tablet 650 mg, 650 mg, Oral, Q4H PRN, Earnest Quintero III, MD, 650 mg at 02/21/19 2307  •  albuterol (PROVENTIL) nebulizer solution 0.083% 2.5 mg/3mL, 1.25 mg, Nebulization, Q6H PRN, Earnest Quintero III, MD  •  amLODIPine (NORVASC) tablet 10 mg, 10 mg, Oral, Nightly, Earnest Quintero III, MD, 10 mg at 02/21/19 2110  •  atorvastatin (LIPITOR) tablet 80 mg, 80 mg, Oral, Daily, Earnest Quintero III, MD, 80 mg at 02/22/19 0829  •  budesonide-formoterol (SYMBICORT) 160-4.5 MCG/ACT inhaler 2 puff, 2 puff, Inhalation, BID - RT, Earnest Quintero III, MD, 2 puff at 02/22/19 0650  •  dextrose (D50W) 25 g/ 50mL Intravenous Solution 25 g, 25 g, Intravenous, Q15 Min PRN, Earnest Quintero III, MD  •  dextrose (GLUTOSE) oral gel 15 g, 15 g, Oral, Q15 Min PRN, Earnest Quintero III, MD  •  fenofibrate (TRICOR) tablet 145 mg, 145 mg, Oral, Daily, Earnest Quintero III, MD, 145 mg at 02/22/19 0829  •  fluticasone (FLONASE) 50 MCG/ACT nasal spray 2 spray, 2 spray, Each Nare, Daily, Earnest Quintero III, MD, 2 spray at 02/22/19 0830  •  glucagon (human recombinant) (GLUCAGEN DIAGNOSTIC) injection 1 mg, 1 mg, Subcutaneous, PRN, Earnest Quintero III, MD  •  HYDROcodone-acetaminophen (NORCO)  MG per tablet 1 tablet, 1 tablet, Oral, Q6H PRN, Santosh Newman MD, 1 tablet at 02/21/19  1105  •  insulin aspart (novoLOG) injection 0-9 Units, 0-9 Units, Subcutaneous, 4x Daily AC & at Bedtime, Earnest Quintero III, MD, 2 Units at 02/22/19 1050  •  insulin aspart (novoLOG) injection 30 Units, 30 Units, Subcutaneous, TID AC, Earnest Quintero III, MD, 30 Units at 02/22/19 1050  •  insulin detemir (LEVEMIR) injection 25 Units, 25 Units, Subcutaneous, QAM, Santosh Newman MD, 25 Units at 02/22/19 0627  •  isosorbide mononitrate (IMDUR) 24 hr tablet 120 mg, 120 mg, Oral, Nightly, Earnest Quintero III, MD, 120 mg at 02/21/19 2109  •  lisinopril (PRINIVIL,ZESTRIL) tablet 40 mg, 40 mg, Oral, Nightly, Earnest Quintero III, MD, 40 mg at 02/21/19 2110  •  nebivolol (BYSTOLIC) tablet 5 mg, 5 mg, Oral, Q24H, Earnest Quintero III, MD, 5 mg at 02/21/19 0942  •  nitroglycerin (NITROSTAT) SL tablet 0.4 mg, 0.4 mg, Sublingual, Q5 Min PRN, Earnest Quintero III, MD  •  nystatin (MYCOSTATIN) powder, , Topical, BID, Earnest Quintero III, MD  •  ondansetron ODT (ZOFRAN-ODT) disintegrating tablet 4 mg, 4 mg, Oral, Q6H PRN, Earnest Quintero III, MD, 4 mg at 02/21/19 0644  •  pantoprazole (PROTONIX) EC tablet 40 mg, 40 mg, Oral, Nightly, Earnest Quintero III, MD, 40 mg at 02/21/19 2110  •  pramipexole (MIRAPEX) tablet 1 mg, 1 mg, Oral, Nightly, Earnest Quintero III, MD, 1 mg at 02/21/19 2110  •  prasugrel (EFFIENT) tablet 10 mg, 10 mg, Oral, Daily, Earnest Quintero III, MD, 10 mg at 02/22/19 0829  •  predniSONE (DELTASONE) tablet 20 mg, 20 mg, Oral, Daily With Breakfast, Earnest Quintero III, MD, 20 mg at 02/22/19 0829  •  promethazine (PHENERGAN) tablet 12.5 mg, 12.5 mg, Oral, Q6H PRN, 12.5 mg at 02/21/19 1107 **OR** promethazine (PHENERGAN) injection 12.5 mg, 12.5 mg, Intramuscular, Q6H PRN **OR** promethazine (PHENERGAN) suppository 12.5 mg, 12.5 mg, Rectal, Q6H PRN, Santosh Newman MD  •  ranolazine (RANEXA) 12 hr tablet 1,000 mg, 1,000 mg, Oral, Q12H, Earnest Quintero III, MD,  1,000 mg at 02/22/19 0829  •  rivaroxaban (XARELTO) tablet 20 mg, 20 mg, Oral, Daily With Dinner, Earnest Quintero III, MD, 20 mg at 02/21/19 1736  •  sertraline (ZOLOFT) tablet 25 mg, 25 mg, Oral, Nightly, Earnest Quintero III, MD, 25 mg at 02/21/19 2110  •  sodium chloride 0.9 % flush 10 mL, 10 mL, Intravenous, PRN, Simon Roldan MD  •  sodium chloride 0.9 % flush 3 mL, 3 mL, Intravenous, Q12H, Earnest Quintero III, MD, 3 mL at 02/21/19 2110  •  sodium chloride 0.9 % flush 3-10 mL, 3-10 mL, Intravenous, PRN, Earnest Quintero III, MD  •  sodium chloride 0.9 % infusion, 125 mL/hr, Intravenous, Continuous, Santosh Newman MD, Last Rate: 125 mL/hr at 02/22/19 1051, 125 mL/hr at 02/22/19 1051  •  traZODone (DESYREL) tablet 300 mg, 300 mg, Oral, Nightly, Earnest Quintero III, MD, 300 mg at 02/21/19 2109    Current Outpatient Medications:   •  amLODIPine (NORVASC) 10 MG tablet, Take 1 tablet by mouth Every Night., Disp: 30 tablet, Rfl: 5  •  atorvastatin (LIPITOR) 80 MG tablet, Take 80 mg by mouth., Disp: , Rfl:   •  fenofibrate (TRICOR) 145 MG tablet, Take 145 mg by mouth Daily., Disp: , Rfl:   •  glucose monitor monitoring kit, 1 each As Needed (check blood glucose 3x daily)., Disp: 1 each, Rfl: 0  •  insulin aspart (novoLOG) 100 UNIT/ML injection, Inject 25 Units under the skin into the appropriate area as directed 3 (Three) Times a Day Before Meals. (Patient taking differently: Inject 30 Units under the skin into the appropriate area as directed 3 (Three) Times a Day Before Meals.), Disp: 10 mL, Rfl: 3  •  insulin degludec (TRESIBA FLEXTOUCH) 100 UNIT/ML solution pen-injector injection, Inject 75 Units under the skin into the appropriate area as directed Every Night., Disp: , Rfl:   •  isosorbide mononitrate (IMDUR) 120 MG 24 hr tablet, Take 1 tablet by mouth Daily. (Patient taking differently: Take 120 mg by mouth Every Night.), Disp: 30 tablet, Rfl: 5  •  Liraglutide (VICTOZA SC), Inject  under  "the skin into the appropriate area as directed., Disp: , Rfl:   •  lisinopril (PRINIVIL,ZESTRIL) 40 MG tablet, Take 1 tablet by mouth Every Morning. (Patient taking differently: Take 40 mg by mouth Every Night.), Disp: 30 tablet, Rfl: 5  •  MICROLET LANCETS misc, One touch delica lancets, Disp: 100 each, Rfl: 5  •  nitroglycerin (NITROSTAT) 0.4 MG SL tablet, Place 1 tablet under the tongue Every 5 (Five) Minutes As Needed for Chest Pain. Take no more than 3 doses in 15 minutes., Disp: 100 tablet, Rfl: 12  •  ondansetron ODT (ZOFRAN-ODT) 4 MG disintegrating tablet, Take 1 tablet by mouth Every 6 (Six) Hours As Needed for Nausea or Vomiting., Disp: 10 tablet, Rfl: 0  •  pantoprazole (PROTONIX) 40 MG EC tablet, Take 1 tablet by mouth Every Night., Disp: 30 tablet, Rfl: 5  •  pramipexole (MIRAPEX) 1 MG tablet, TAKE TWO TABLETS BY MOUTH EVERY NIGHT, Disp: 60 tablet, Rfl: 5  •  prasugrel (EFFIENT) 10 MG tablet, Take 10 mg by mouth., Disp: , Rfl:   •  ranolazine (RANEXA) 1000 MG 12 hr tablet, Take 1 tablet by mouth Every 12 (Twelve) Hours., Disp: 60 tablet, Rfl: 5  •  RELION INSULIN SYRINGE 31G X 15/64\" 0.5 ML misc, , Disp: , Rfl:   •  rivaroxaban (XARELTO) 20 MG tablet, Take 1 tablet by mouth Daily. (Patient taking differently: Take 20 mg by mouth Daily With Dinner.), Disp: 30 tablet, Rfl: 4  •  traZODone (DESYREL) 300 MG tablet, TAKE ONE TABLET BY MOUTH EVERY NIGHT, Disp: 30 tablet, Rfl: 5  •  albuterol (ACCUNEB) 1.25 MG/3ML nebulizer solution, Take 3 mL by nebulization Every 6 (Six) Hours As Needed for Wheezing., Disp: 100 vial, Rfl: 0  •  albuterol (PROVENTIL HFA;VENTOLIN HFA) 108 (90 BASE) MCG/ACT inhaler, Inhale 2 puffs Every 4 (Four) Hours As Needed for Wheezing., Disp: , Rfl:   •  azithromycin (ZITHROMAX) 250 MG tablet, Take 2 tablets the first day, then 1 tablet daily for 4 days. (Patient not taking: Reported on 2/20/2019), Disp: 6 tablet, Rfl: 0  •  budesonide-formoterol (SYMBICORT) 160-4.5 MCG/ACT inhaler, " Inhale 2 puffs 2 (Two) Times a Day., Disp: 6 g, Rfl: 12  •  BYSTOLIC 10 MG tablet, 5 mg 2 (Two) Times a Day., Disp: , Rfl:   •  cephalexin (KEFLEX) 500 MG capsule, Take 1 capsule by mouth 4 (Four) Times a Day. (Patient not taking: Reported on 2/20/2019), Disp: 28 capsule, Rfl: 0  •  fluticasone (FLONASE) 50 MCG/ACT nasal spray, 2 sprays by Each Nare route Daily., Disp: , Rfl:   •  nystatin (MYCOSTATIN) 438394 UNIT/GM powder, Apply  topically to the appropriate area as directed 2 (Two) Times a Day. (Patient not taking: Reported on 2/20/2019), Disp: 30 g, Rfl: 0  •  predniSONE (DELTASONE) 20 MG tablet, Take 1 tablet by mouth Daily. (Patient not taking: Reported on 2/20/2019), Disp: 5 tablet, Rfl: 0  •  sertraline (ZOLOFT) 25 MG tablet, Take 1 tablet by mouth Daily. (Patient taking differently: Take 25 mg by mouth Every Night.), Disp: 30 tablet, Rfl: 5     Diagnostic Data    Lab Results (last 24 hours)     Procedure Component Value Units Date/Time    POC Glucose Once [[id number]]  (Abnormal) Collected:  02/22/19 1051    Specimen:  Blood Updated:  02/22/19 1114     Glucose 161 mg/dL      Comment: Sliding Scale AdminOperator: [id number] TIP LOPEZMeter ID: [id number]       Comprehensive Metabolic Panel [[id number]]  (Abnormal) Collected:  02/22/19 0925    Specimen:  Blood Updated:  02/22/19 0943     Glucose 181 mg/dL      BUN 11 mg/dL      Creatinine 0.65 mg/dL      Sodium 134 mmol/L      Potassium 4.0 mmol/L      Chloride 102 mmol/L      CO2 26.0 mmol/L      Calcium 8.6 mg/dL      Total Protein 6.0 g/dL      Albumin 3.20 g/dL      ALT (SGPT) 23 U/L      AST (SGOT) 17 U/L      Alkaline Phosphatase 56 U/L      Total Bilirubin 0.3 mg/dL      eGFR Non African Amer 136 mL/min/1.73      Globulin 2.8 gm/dL      A/G Ratio 1.1 g/dL      BUN/Creatinine Ratio 16.9     Anion Gap 6.0 mmol/L     POC Glucose Once [[id number]]  (Abnormal) Collected:  02/22/19 0614    Specimen:  Blood Updated:  02/22/19 0731     Glucose 177 mg/dL       Comment: RN NotifiedOperator: [id number] DANIELLE NIEVESFERMeter ID: [id number]       CBC Auto Differential [[id number]]  (Abnormal) Collected:  02/22/19 0533    Specimen:  Blood Updated:  02/22/19 0613     WBC 8.27 10*3/mm3      RBC 4.08 10*6/mm3      Hemoglobin 11.2 g/dL      Hematocrit 34.5 %      MCV 84.6 fL      MCH 27.5 pg      MCHC 32.5 g/dL      RDW 15.1 %      RDW-SD 46.3 fl      MPV 9.5 fL      Platelets 203 10*3/mm3      Neutrophil % 59.8 %      Lymphocyte % 25.5 %      Monocyte % 9.3 %      Eosinophil % 3.3 %      Basophil % 0.5 %      Immature Grans % 1.6 %      Neutrophils, Absolute 4.95 10*3/mm3      Lymphocytes, Absolute 2.11 10*3/mm3      Monocytes, Absolute 0.77 10*3/mm3      Eosinophils, Absolute 0.27 10*3/mm3      Basophils, Absolute 0.04 10*3/mm3      Immature Grans, Absolute 0.13 10*3/mm3      nRBC 0.0 /100 WBC     POC Glucose Once [[id number]]  (Abnormal) Collected:  02/20/19 1614    Specimen:  Blood Updated:  02/22/19 0053     Glucose 255 mg/dL      Comment: RN NotifiedOperator: [id number] MYRON CHILDERSParkland Health CenterMeter ID: [id number]       POC Glucose Once [[id number]]  (Abnormal) Collected:  02/21/19 1928    Specimen:  Blood Updated:  02/21/19 2049     Glucose 253 mg/dL      Comment: RN NotifiedOperator: [id number] DANIELLE NIEVESFERMeter ID: [id number]       POC Glucose Once [[id number]]  (Abnormal) Collected:  02/21/19 1610    Specimen:  Blood Updated:  02/21/19 1705     Glucose 145 mg/dL      Comment: RN NotifiedOperator: [id number] ARPAN ARIASMeter ID: [id number]       Urinalysis With Microscopic If Indicated (No Culture) - Urine, Clean Catch [[id number]]  (Abnormal) Collected:  02/21/19 1431    Specimen:  Urine, Clean Catch Updated:  02/21/19 1456     Color, UA Yellow     Appearance, UA Clear     pH, UA 6.0     Specific Gravity, UA 1.018     Glucose,  mg/dL (2+)     Ketones, UA Negative     Bilirubin, UA Negative     Blood, UA Negative     Protein, UA 30 mg/dL (1+)     Leuk Esterase, UA  Negative     Nitrite, UA Negative     Urobilinogen, UA 1.0 E.U./dL    Urinalysis, Microscopic Only - Urine, Clean Catch [[id number]] Collected:  02/21/19 1431    Specimen:  Urine, Clean Catch Updated:  02/21/19 1456     RBC, UA None Seen /HPF      WBC, UA 0-2 /HPF      Bacteria, UA None Seen /HPF      Squamous Epithelial Cells, UA None Seen /HPF      Hyaline Casts, UA 0-2 /LPF      Methodology Automated Microscopy            I reviewed the patient's new clinical results.    Assessment/Plan:     Active Hospital Problems    Diagnosis Date Noted   • CAD (coronary artery disease) [I25.10] 03/30/2018     Patient previously noncompliant with double stenting of LAD.  Resuming medically optimized medications.  Aspirin therapy being held with direct oral anticoagulation combined with antiplatelet agent.  -Atorvastatin 80 mg p.o. daily  -Fenofibrate 145 mg p.o. daily  -Isosorbide mononitrate 120 mg p.o. nightly  -Lisinopril 40 mg p.o. nightly  - Nebivolol 5 mg p.o. daily  - Prasugrel 10 mg p.o. daily  -Ranolazine 1000 mg p.o. twice daily     • Type 2 diabetes mellitus without complication, with long-term current use of insulin (CMS/[facility]) [E11.9, Z79.4] 03/30/2018     Patient on profound a.m. long-acting degludec insulin.  Patient also on pre-meal insulin.  Patient also on GLP-1 agonist.  Will hold GLP-1 agonist and replace with short acting insulin sliding scale.  -Insulin detemir 75 units every morning  - Insulin aspart 30 units before meals  -Insulin aspart sliding scale moderate to high dose  - POC glucose before meals and at bedtime     • Class 3 severe obesity due to excess calories with serious comorbidity and body mass index (BMI) of 60.0 to 69.9 in adult (CMS/[facility]) [E66.01, Z68.44] 03/08/2018     Body mass index is 62.94 kg/m².  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  Is contributing to history of multiple bilateral pulmonary  emboli.  Patient has been off anticoagulation for 1 month, resuming.  -Rivaroxaban 20 mg p.o. q. p.m.     • Chronic pulmonary embolism (CMS/HCC) [I27.82] 04/22/2017     Patient with CTA chest positive 12/2017 with bilateral pulmonary emboli.  Patient has frequent history of CTA chests.  Patient however has been off on anticoagulation for greater than a months time. Chest pain unclear if pulmonary in origin.  D-dimer elevated in the emergency department.  -CTA of chest, negative  -Rivaroxaban 20 mg p.o. q. p.m.     • Essential hypertension [I10]      Patient on multiple antihypertensives.  Patient without hypertensive urgency/emergency.  Will resume home medications and trend blood pressures per hospital protocol.  - Amlodipine 10 mg p.o. nightly  -Isosorbide mononitrate 120 milligrams p.o. nightly  - Lisinopril 40 mg p.o. nightly  -Nebivolol 5 mg p.o. daily     • GERD (gastroesophageal reflux disease) [K21.9]      Resuming home medications.  -Pantoprazole 40 mg p.o. every morning     • RLS (restless legs syndrome) [G25.81]      History of restless leg syndrome resuming home medications.  -Pramipexole 1 mg p.o. nightly     • ADOLFO on CPAP [G47.33, Z99.89] 02/17/2016     Patient with obesity hypoventilation syndrome and obstructive sleep apnea on CPAP at at bedtime.  -Respiratory therapy for CPAP.     • Recurrent major depressive disorder, in partial remission (CMS/HCC) [F33.41] 11/24/2009     Patient with history of psychogenic insomnia.  Currently not in exacerbation resuming home medications.  - Sertraline 25 mg p.o. nightly  -Trazodone 300 mg p.o. nightly         DVT prophylaxis: xarelto  Code status is   Code Status and Medical Interventions:   Ordered at: 02/20/19 1126     Level Of Support Discussed With:    Patient     Code Status:    CPR     Medical Interventions (Level of Support Prior to Arrest):    Full       Plan for disposition:home today         This document has been electronically signed by Santosh  Oren Newman MD on February 22, 2019 2:30 PM

## 2019-02-22 NOTE — PLAN OF CARE
Problem: Patient Care Overview  Goal: Plan of Care Review  Outcome: Ongoing (interventions implemented as appropriate)   02/22/19 0511   Coping/Psychosocial   Plan of Care Reviewed With patient   Plan of Care Review   Progress improving       Problem: Cardiac: ACS (Acute Coronary Syndrome) (Adult)  Goal: Signs and Symptoms of Listed Potential Problems Will be Absent, Minimized or Managed (Cardiac: ACS)  Outcome: Ongoing (interventions implemented as appropriate)      Problem: Pain, Chronic (Adult)  Goal: Identify Related Risk Factors and Signs and Symptoms  Outcome: Ongoing (interventions implemented as appropriate)    Goal: Acceptable Pain/Comfort Level and Functional Ability  Outcome: Ongoing (interventions implemented as appropriate)      Problem: Diabetes, Type 2 (Adult)  Goal: Signs and Symptoms of Listed Potential Problems Will be Absent, Minimized or Managed (Diabetes, Type 2)  Outcome: Ongoing (interventions implemented as appropriate)

## 2019-02-23 ENCOUNTER — READMISSION MANAGEMENT (OUTPATIENT)
Dept: CALL CENTER | Facility: HOSPITAL | Age: 39
End: 2019-02-23

## 2019-02-23 NOTE — OUTREACH NOTE
Prep Survey      Responses   Facility patient discharged from?  [place]   Is patient eligible?  Yes   Discharge diagnosis  Idiopathic acute pancreatitis,chest pain, CAD, IDDM II, class 3 severe obestiy BMI 60-69.9, Factor II def., chronic pulmonary embolism, ess, HTN, GERD, RLS, ADOLFO, recurrent major depressive disorder   Does the patient have one of the following disease processes/diagnoses(primary or secondary)?  Other   Does the patient have Home health ordered?  No   Is there a DME ordered?  No   Comments regarding appointments  See AVS   Prep survey completed?  Yes          Radha Merino RN

## 2019-02-25 ENCOUNTER — READMISSION MANAGEMENT (OUTPATIENT)
Dept: CALL CENTER | Facility: HOSPITAL | Age: 39
End: 2019-02-25

## 2019-02-25 NOTE — OUTREACH NOTE
Medical Week 1 Survey      Responses   Facility patient discharged from?  [place]   Does the patient have one of the following disease processes/diagnoses(primary or secondary)?  Other   Is there a successful TCM telephone encounter documented?  No   Week 1 attempt successful?  No   Unsuccessful attempts  Attempt 1          Diane Marshall RN

## 2019-02-27 ENCOUNTER — READMISSION MANAGEMENT (OUTPATIENT)
Dept: CALL CENTER | Facility: HOSPITAL | Age: 39
End: 2019-02-27

## 2019-02-27 NOTE — OUTREACH NOTE
Medical Week 1 Survey      Responses   Facility patient discharged from?  [place]   Does the patient have one of the following disease processes/diagnoses(primary or secondary)?  Other   Is there a successful TCM telephone encounter documented?  No   Week 1 attempt successful?  No   Unsuccessful attempts  Attempt 2          Iona Hernandez RN

## 2019-03-03 ENCOUNTER — READMISSION MANAGEMENT (OUTPATIENT)
Dept: CALL CENTER | Facility: HOSPITAL | Age: 39
End: 2019-03-03

## 2019-03-03 NOTE — OUTREACH NOTE
Medical Week 2 Survey      Responses   Facility patient discharged from?  [place]   Does the patient have one of the following disease processes/diagnoses(primary or secondary)?  Other   Week 2 attempt successful?  No   Unsuccessful attempts  Attempt 1          Fe Sales RN

## 2019-03-05 ENCOUNTER — READMISSION MANAGEMENT (OUTPATIENT)
Dept: CALL CENTER | Facility: HOSPITAL | Age: 39
End: 2019-03-05

## 2019-03-05 NOTE — OUTREACH NOTE
Medical Week 2 Survey      Responses   Facility patient discharged from?  [place]   Does the patient have one of the following disease processes/diagnoses(primary or secondary)?  Other   Week 2 attempt successful?  No   Unsuccessful attempts  Attempt 2          Tere Zimmerman RN

## 2019-03-08 ENCOUNTER — APPOINTMENT (OUTPATIENT)
Dept: GENERAL RADIOLOGY | Facility: HOSPITAL | Age: 39
End: 2019-03-08

## 2019-03-08 ENCOUNTER — HOSPITAL ENCOUNTER (EMERGENCY)
Facility: HOSPITAL | Age: 39
Discharge: HOME | End: 2019-03-09
Attending: EMERGENCY MEDICINE | Admitting: EMERGENCY MEDICINE

## 2019-03-08 DIAGNOSIS — R10.30 LOWER ABDOMINAL PAIN: Primary | ICD-10-CM

## 2019-03-08 DIAGNOSIS — E87.1 HYPONATREMIA: ICD-10-CM

## 2019-03-08 DIAGNOSIS — R07.9 CHEST PAIN, UNSPECIFIED TYPE: ICD-10-CM

## 2019-03-08 DIAGNOSIS — R73.9 HYPERGLYCEMIA: ICD-10-CM

## 2019-03-08 LAB
BACTERIA UR QL AUTO: NORMAL /HPF
BILIRUB UR QL STRIP: NEGATIVE
CLARITY UR: CLEAR
COLOR UR: YELLOW
GLUCOSE UR STRIP-MCNC: ABNORMAL MG/DL
HGB UR QL STRIP.AUTO: NEGATIVE
HYALINE CASTS UR QL AUTO: NORMAL /LPF
KETONES UR QL STRIP: NEGATIVE
LEUKOCYTE ESTERASE UR QL STRIP.AUTO: NEGATIVE
NITRITE UR QL STRIP: NEGATIVE
PH UR STRIP.AUTO: 6.5 [PH] (ref 5–9)
PROT UR QL STRIP: ABNORMAL
RBC # UR: NORMAL /HPF
REF LAB TEST METHOD: NORMAL
SP GR UR STRIP: 1.03 (ref 1–1.03)
SQUAMOUS #/AREA URNS HPF: NORMAL /HPF
UROBILINOGEN UR QL STRIP: ABNORMAL
WBC UR QL AUTO: NORMAL /HPF

## 2019-03-08 PROCEDURE — 80053 COMPREHEN METABOLIC PANEL: CPT | Performed by: EMERGENCY MEDICINE

## 2019-03-08 PROCEDURE — 81001 URINALYSIS AUTO W/SCOPE: CPT | Performed by: EMERGENCY MEDICINE

## 2019-03-08 PROCEDURE — 85025 COMPLETE CBC W/AUTO DIFF WBC: CPT | Performed by: EMERGENCY MEDICINE

## 2019-03-08 PROCEDURE — 83880 ASSAY OF NATRIURETIC PEPTIDE: CPT | Performed by: EMERGENCY MEDICINE

## 2019-03-08 PROCEDURE — 84484 ASSAY OF TROPONIN QUANT: CPT | Performed by: EMERGENCY MEDICINE

## 2019-03-08 PROCEDURE — 93010 ELECTROCARDIOGRAM REPORT: CPT | Performed by: INTERNAL MEDICINE

## 2019-03-08 PROCEDURE — 93005 ELECTROCARDIOGRAM TRACING: CPT

## 2019-03-08 PROCEDURE — 93005 ELECTROCARDIOGRAM TRACING: CPT | Performed by: EMERGENCY MEDICINE

## 2019-03-08 PROCEDURE — 99284 EMERGENCY DEPT VISIT MOD MDM: CPT

## 2019-03-08 PROCEDURE — 83690 ASSAY OF LIPASE: CPT | Performed by: EMERGENCY MEDICINE

## 2019-03-08 PROCEDURE — 71045 X-RAY EXAM CHEST 1 VIEW: CPT

## 2019-03-08 RX ORDER — SODIUM CHLORIDE 0.9 % (FLUSH) 0.9 %
10 SYRINGE (ML) INJECTION AS NEEDED
Status: DISCONTINUED | OUTPATIENT
Start: 2019-03-08 | End: 2019-03-09 | Stop reason: HOSPADM

## 2019-03-08 RX ORDER — ASPIRIN 81 MG/1
324 TABLET, CHEWABLE ORAL ONCE
Status: COMPLETED | OUTPATIENT
Start: 2019-03-08 | End: 2019-03-09

## 2019-03-09 ENCOUNTER — APPOINTMENT (OUTPATIENT)
Dept: CT IMAGING | Facility: HOSPITAL | Age: 39
End: 2019-03-09

## 2019-03-09 VITALS
BODY MASS INDEX: 44.1 KG/M2 | SYSTOLIC BLOOD PRESSURE: 141 MMHG | WEIGHT: 315 LBS | HEART RATE: 78 BPM | DIASTOLIC BLOOD PRESSURE: 60 MMHG | OXYGEN SATURATION: 98 % | TEMPERATURE: 98.6 F | RESPIRATION RATE: 36 BRPM | HEIGHT: 71 IN

## 2019-03-09 LAB
ALBUMIN SERPL-MCNC: 3.8 G/DL (ref 3.4–4.8)
ALBUMIN/GLOB SERPL: 1.1 G/DL (ref 1.1–1.8)
ALP SERPL-CCNC: 71 U/L (ref 38–126)
ALT SERPL W P-5'-P-CCNC: 23 U/L (ref 21–72)
ANION GAP SERPL CALCULATED.3IONS-SCNC: 8 MMOL/L (ref 5–15)
AST SERPL-CCNC: 31 U/L (ref 17–59)
BASOPHILS # BLD AUTO: 0.05 10*3/MM3 (ref 0–0.2)
BASOPHILS NFR BLD AUTO: 0.6 % (ref 0–1.5)
BILIRUB SERPL-MCNC: 0.3 MG/DL (ref 0.2–1.3)
BUN BLD-MCNC: 14 MG/DL (ref 7–21)
BUN/CREAT SERPL: 21.5 (ref 7–25)
CALCIUM SPEC-SCNC: 9.2 MG/DL (ref 8.4–10.2)
CHLORIDE SERPL-SCNC: 95 MMOL/L (ref 95–110)
CO2 SERPL-SCNC: 25 MMOL/L (ref 22–31)
CREAT BLD-MCNC: 0.65 MG/DL (ref 0.7–1.3)
DEPRECATED RDW RBC AUTO: 40.7 FL (ref 37–54)
EOSINOPHIL # BLD AUTO: 0.35 10*3/MM3 (ref 0–0.4)
EOSINOPHIL NFR BLD AUTO: 3.9 % (ref 0.3–6.2)
ERYTHROCYTE [DISTWIDTH] IN BLOOD BY AUTOMATED COUNT: 14.2 % (ref 12.3–15.4)
GFR SERPL CREATININE-BSD FRML MDRD: 136 ML/MIN/1.73 (ref 63–147)
GLOBULIN UR ELPH-MCNC: 3.5 GM/DL (ref 2.3–3.5)
GLUCOSE BLD-MCNC: 329 MG/DL (ref 60–100)
HCT VFR BLD AUTO: 37.4 % (ref 37.5–51)
HGB BLD-MCNC: 13 G/DL (ref 13–17.7)
HOLD SPECIMEN: NORMAL
HOLD SPECIMEN: NORMAL
IMM GRANULOCYTES # BLD AUTO: 0.14 10*3/MM3 (ref 0–0.05)
IMM GRANULOCYTES NFR BLD AUTO: 1.6 % (ref 0–0.5)
LIPASE SERPL-CCNC: 130 U/L (ref 23–300)
LYMPHOCYTES # BLD AUTO: 1.71 10*3/MM3 (ref 0.7–3.1)
LYMPHOCYTES NFR BLD AUTO: 19.3 % (ref 19.6–45.3)
MCH RBC QN AUTO: 27.7 PG (ref 26.6–33)
MCHC RBC AUTO-ENTMCNC: 34.8 G/DL (ref 31.5–35.7)
MCV RBC AUTO: 79.7 FL (ref 79–97)
MONOCYTES # BLD AUTO: 0.86 10*3/MM3 (ref 0.1–0.9)
MONOCYTES NFR BLD AUTO: 9.7 % (ref 5–12)
NEUTROPHILS # BLD AUTO: 5.77 10*3/MM3 (ref 1.4–7)
NEUTROPHILS NFR BLD AUTO: 64.9 % (ref 42.7–76)
NRBC BLD AUTO-RTO: 0 /100 WBC (ref 0–0)
NT-PROBNP SERPL-MCNC: 38.4 PG/ML (ref 0–450)
PLATELET # BLD AUTO: 194 10*3/MM3 (ref 140–450)
PMV BLD AUTO: 9.1 FL (ref 6–12)
POTASSIUM BLD-SCNC: 3.7 MMOL/L (ref 3.5–5.1)
PROT SERPL-MCNC: 7.3 G/DL (ref 6.3–8.6)
RBC # BLD AUTO: 4.69 10*6/MM3 (ref 4.14–5.8)
SODIUM BLD-SCNC: 128 MMOL/L (ref 137–145)
TROPONIN I SERPL-MCNC: <0.012 NG/ML
WBC NRBC COR # BLD: 8.88 10*3/MM3 (ref 3.4–10.8)
WHOLE BLOOD HOLD SPECIMEN: NORMAL

## 2019-03-09 PROCEDURE — 74176 CT ABD & PELVIS W/O CONTRAST: CPT

## 2019-03-09 PROCEDURE — 63710000001 INSULIN REGULAR HUMAN PER 5 UNITS: Performed by: EMERGENCY MEDICINE

## 2019-03-09 RX ORDER — DICYCLOMINE HYDROCHLORIDE 10 MG/1
20 CAPSULE ORAL ONCE
Status: COMPLETED | OUTPATIENT
Start: 2019-03-09 | End: 2019-03-09

## 2019-03-09 RX ORDER — DICYCLOMINE HCL 20 MG
20 TABLET ORAL EVERY 6 HOURS PRN
Qty: 21 TABLET | Refills: 0 | Status: SHIPPED | OUTPATIENT
Start: 2019-03-09 | End: 2019-06-14

## 2019-03-09 RX ADMIN — HUMAN INSULIN 6 UNITS: 100 INJECTION, SOLUTION SUBCUTANEOUS at 01:21

## 2019-03-09 RX ADMIN — DICYCLOMINE HYDROCHLORIDE 20 MG: 10 CAPSULE ORAL at 02:02

## 2019-03-09 RX ADMIN — ASPIRIN 81 MG 324 MG: 81 TABLET ORAL at 00:21

## 2019-03-09 NOTE — DISCHARGE INSTRUCTIONS
Follow-up with your doctor and your cardiologist for further evaluation and management.  Return with any new or worsening symptoms or any concerns.

## 2019-03-09 NOTE — ED PROVIDER NOTES
Subjective   Patient is a 38-year-old male who presents emergency department with complaint of abdominal pain today.  Nursing note appreciated.  Patient states that he had lower abdominal pain this morning.  Also can radiate to the left.  Patient says this is worse with urination.  Patient is diarrhea subacutely for several years.  Patient also states that he has had shortness of breath and chest pain which he gets quite often he states.  There are multiple workups for the same.  Patient is very large in terms of his size and has shortness of breath quite often.  Patient uses breathing treatments at home for this.  Patient states he is felt very warm, without known fevers as he does not take his temperature.  Patient has had no blood in the stools or urine.            Review of Systems   Constitutional: Positive for fatigue. Negative for activity change, appetite change, chills and fever.   HENT: Negative.  Negative for congestion.    Eyes: Negative.  Negative for photophobia and visual disturbance.   Respiratory: Positive for cough, chest tightness, shortness of breath and wheezing.    Cardiovascular: Positive for chest pain. Negative for palpitations.   Gastrointestinal: Positive for abdominal pain and nausea. Negative for constipation, diarrhea and vomiting.   Endocrine: Negative.    Genitourinary: Negative.  Negative for decreased urine volume, dysuria, flank pain and hematuria.   Musculoskeletal: Negative.  Negative for arthralgias, back pain, myalgias, neck pain and neck stiffness.   Skin: Negative.  Negative for pallor.   Neurological: Negative.  Negative for dizziness, syncope, weakness, light-headedness, numbness and headaches.   Psychiatric/Behavioral: Negative.  Negative for confusion and suicidal ideas. The patient is not nervous/anxious.    All other systems reviewed and are negative.      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[facility])    • Fatty liver    • GERD (gastroesophageal reflux disease)    • Hyperlipidemia    • Hypertension    • Morbid obesity (CMS/[facility])    • Pulmonary embolism (CMS/[facility])    • RLS (restless legs syndrome)    • Seizures (CMS/[facility])     last one many years ago   • Sleep apnea     c-pap       Allergies   Allergen Reactions   • Tramadol Other (See Comments)     seizures   • Risperidone Other (See Comments)     Slurred speech  Can't stand, trouble breathing, slurred speech         Past Surgical History:   Procedure Laterality Date   • ADENOIDECTOMY     • CARDIAC CATHETERIZATION N/A 3/15/2017    Procedure: Left Heart Cath;  Surgeon: Edwige Briceno MD;  Location: [facility] CATH INVASIVE LOCATION;  Service:    • CARDIAC CATHETERIZATION N/A 3/15/2017    Procedure: Stent SOPHIA coronary;  Surgeon: Edwige Briceno MD;  Location: [facility] CATH INVASIVE LOCATION;  Service:    • CARDIAC CATHETERIZATION N/A 3/1/2018    Procedure: Left Heart Cath;  Surgeon: Conor Parsons MD;  Location: [facility] CATH INVASIVE LOCATION;  Service:    • CHOLECYSTECTOMY      lap   • CORONARY ANGIOPLASTY WITH STENT PLACEMENT     • SC RT/LT HEART CATHETERS N/A 3/15/2017    Procedure: Percutaneous Coronary Intervention;  Surgeon: Edwige Briceno MD;  Location: [facility] CATH INVASIVE LOCATION;  Service: Cardiovascular   • TONSILLECTOMY     • TRANSESOPHAGEAL ECHOCARDIOGRAM ([place])         Family History   Problem Relation Age of Onset   • Heart disease Mother    • Hypertension Mother    • Hyperlipidemia Mother    • Coronary artery disease Mother    • Diabetes Mother    • Obesity Mother    • Kidney failure Mother    • Sleep apnea Father    • Cancer Paternal Grandfather        Social History     Socioeconomic History   • Marital status:      Spouse name: Cori   • Number of children: 0   • Years of education: Not on file   • Highest education level: Not on file   Occupational  History   • Occupation: Disabled   Tobacco Use   • Smoking status: Former Smoker     Packs/day: 0.25     Years: 0.50     Pack years: 0.12     Types: Cigarettes     Last attempt to quit:      Years since quittin.1   • Smokeless tobacco: Current User     Types: Snuff   • Tobacco comment: quit smoking 25 years ago; he does use snuff   Substance and Sexual Activity   • Alcohol use: No   • Drug use: No   • Sexual activity: Defer           Objective   Physical Exam   Constitutional: He is oriented to person, place, and time. He appears well-developed and well-nourished.  Non-toxic appearance. He does not appear ill. No distress.   Obese deconditioned male in no acute distress.   HENT:   Head: Normocephalic and atraumatic.   Eyes: Conjunctivae and EOM are normal.   Neck: Normal range of motion. Neck supple. No JVD present.   Cardiovascular: Normal rate, regular rhythm, normal heart sounds and intact distal pulses. Exam reveals no gallop and no friction rub.   No murmur heard.  Pulmonary/Chest: Effort normal. No respiratory distress. He has no wheezes. He has no rales. He exhibits no tenderness.   Abdominal: Soft. Bowel sounds are normal. He exhibits no distension and no mass. There is no tenderness. There is no rebound and no guarding.   No guarding, no rigidity, no rebound.   Musculoskeletal: Normal range of motion.        Right lower leg: He exhibits edema.        Left lower leg: He exhibits edema.   Patient with 1+ lower edema bilateral lower extremities.  The symmetric.  This is not a large amount edema to this patient.   Neurological: He is alert and oriented to person, place, and time.   Skin: Skin is warm and dry. Capillary refill takes less than 2 seconds.   Psychiatric: He has a normal mood and affect. His behavior is normal. Judgment and thought content normal.   Nursing note and vitals reviewed.      ECG 12 Lead    Date/Time: 3/8/2019 9:44 PM  Performed by: Asim Kumar MD  Authorized by: José  Asim SUAREZ MD   Interpreted by physician  Rhythm: sinus rhythm  Rate: normal  BPM: 94  QRS axis: normal  Other: no other findings  Clinical impression: normal ECG                 ED Course      Labs Reviewed   COMPREHENSIVE METABOLIC PANEL - Abnormal; Notable for the following components:       Result Value    Glucose 329 (*)     Creatinine 0.65 (*)     Sodium 128 (*)     All other components within normal limits   CBC WITH AUTO DIFFERENTIAL - Abnormal; Notable for the following components:    Hematocrit 37.4 (*)     Lymphocyte % 19.3 (*)     Immature Grans % 1.6 (*)     Immature Grans, Absolute 0.14 (*)     All other components within normal limits   URINALYSIS W/ MICROSCOPIC IF INDICATED (NO CULTURE) - Abnormal; Notable for the following components:    Glucose, UA >=1000 mg/dL (3+) (*)     Protein, UA 30 mg/dL (1+) (*)     All other components within normal limits   TROPONIN (IN-HOUSE) - Normal   BNP (IN-HOUSE) - Normal   LIPASE - Normal   URINALYSIS, MICROSCOPIC ONLY   RAINBOW DRAW    Narrative:     The following orders were created for panel order [place] Draw.  Procedure                               Abnormality         Status                     ---------                               -----------         ------                     Light Blue Top[[id number]]                                                              Green Top (Gel)[[id number]]                                  Final result               Lavender Top[[id number]]                                     Final result               Gold Top - SST[[id number]]                                   Final result                 Please view results for these tests on the individual orders.   TROPONIN (IN-HOUSE)   RAINBOW DRAW    Narrative:     The following orders were created for panel order [place] Draw.  Procedure                               Abnormality         Status                     ---------                               -----------         ------                      Light Blue Top[[id number]]                                                              Green Top (Gel)[[id number]]                                                             Lavender Top[[id number]]                                                                Gold Top - SST[[id number]]                                                                Please view results for these tests on the individual orders.   CBC AND DIFFERENTIAL    Narrative:     The following orders were created for panel order CBC & Differential.  Procedure                               Abnormality         Status                     ---------                               -----------         ------                     CBC Auto Differential[[id number]]        Abnormal            Final result                 Please view results for these tests on the individual orders.   GREEN TOP   LAVENDER TOP   GOLD TOP - SST   LIGHT BLUE TOP   LIGHT BLUE TOP   GREEN TOP   LAVENDER TOP   GOLD TOP - SST       CT Abdomen Pelvis Without Contrast   Final Result   Impression: No obvious acute abnormality      Electronically signed by:  Brennan Corrigan MD  3/9/2019 1:00 AM CST   Workstation: [id number]      XR Chest 1 View   Final Result   No acute cardiopulmonary abnormality.      Electronically signed by:  Bernnan Corrigan MD  3/8/2019 11:43 PM   CST Workstation: [id number]        Patient with long-standing chest pain, negative markers, normal EKG.  Some mild to moderate hyperglycemia with pseudohyponatremia.  Patient with abdominal pain with no findings on CT scan or lab work.  Patient be discharged with antispasmodics to also help with his diarrhea.  Outpatient follow-up with primary the patient's primary cardiologist.            MDM      Final diagnoses:   Lower abdominal pain   Chest pain, unspecified type   Hyperglycemia   Hyponatremia            Asim Kumar MD  03/09/19 0147

## 2019-03-11 ENCOUNTER — PATIENT OUTREACH (OUTPATIENT)
Dept: CASE MANAGEMENT | Facility: OTHER | Age: 39
End: 2019-03-11

## 2019-03-11 NOTE — OUTREACH NOTE
Unable to reach patient / attempt x 1 for Care Advising 3/9/19 ED follow up. Left voicemail with [facility] contact information.

## 2019-03-14 ENCOUNTER — PATIENT OUTREACH (OUTPATIENT)
Dept: CASE MANAGEMENT | Facility: OTHER | Age: 39
End: 2019-03-14

## 2019-03-14 NOTE — OUTREACH NOTE
"Talked with patient. Discussed 3/8/19 ED visit regarding lower abdominal pain' and chest pain  Patient states symptoms of chest pain have improved but continues with abdominal pain and episodes of diarrhea which he has \"off and on for a long time\".  He reports no difficulty with SOB; appetite or sleeping. Patient lives with spouse; independent with ADL's; meal preparation; transportation and ambulates without assistive device. He states to be compliant with medications; and medical appointments. He states to use CPAP at while sleeping; and monitors blood pressure and blood sugars occassionally. Patient states that due to bad weather near his home he needs to discontinue conversation at this time. Will continue to follow.   "

## 2019-03-18 ENCOUNTER — PATIENT OUTREACH (OUTPATIENT)
Dept: CASE MANAGEMENT | Facility: OTHER | Age: 39
End: 2019-03-18

## 2019-03-21 ENCOUNTER — PATIENT OUTREACH (OUTPATIENT)
Dept: CASE MANAGEMENT | Facility: OTHER | Age: 39
End: 2019-03-21

## 2019-03-27 ENCOUNTER — PATIENT OUTREACH (OUTPATIENT)
Dept: CASE MANAGEMENT | Facility: OTHER | Age: 39
End: 2019-03-27

## 2019-04-04 ENCOUNTER — EPISODE CHANGES (OUTPATIENT)
Dept: CASE MANAGEMENT | Facility: OTHER | Age: 39
End: 2019-04-04

## 2019-04-09 ENCOUNTER — HOSPITAL ENCOUNTER (OUTPATIENT)
Facility: HOSPITAL | Age: 39
Setting detail: OBSERVATION
Discharge: HOME | End: 2019-04-10
Attending: EMERGENCY MEDICINE | Admitting: FAMILY MEDICINE

## 2019-04-09 ENCOUNTER — APPOINTMENT (OUTPATIENT)
Dept: GENERAL RADIOLOGY | Facility: HOSPITAL | Age: 39
End: 2019-04-09

## 2019-04-09 DIAGNOSIS — R07.9 CHEST PAIN WITH HIGH RISK FOR CARDIAC ETIOLOGY: Primary | ICD-10-CM

## 2019-04-09 LAB
ALBUMIN SERPL-MCNC: 3.4 G/DL (ref 3.5–5.2)
ALBUMIN/GLOB SERPL: 0.9 G/DL
ALP SERPL-CCNC: 87 U/L (ref 39–117)
ALT SERPL W P-5'-P-CCNC: 21 U/L (ref 1–41)
ANION GAP SERPL CALCULATED.3IONS-SCNC: 12 MMOL/L
APTT PPP: 32.4 SECONDS (ref 20–40.3)
AST SERPL-CCNC: 17 U/L (ref 1–40)
BASOPHILS # BLD AUTO: 0.04 10*3/MM3 (ref 0–0.2)
BASOPHILS NFR BLD AUTO: 0.5 % (ref 0–1.5)
BILIRUB SERPL-MCNC: 0.2 MG/DL (ref 0.2–1.2)
BUN BLD-MCNC: 11 MG/DL (ref 6–20)
BUN/CREAT SERPL: 15.9 (ref 7–25)
CALCIUM SPEC-SCNC: 9.3 MG/DL (ref 8.6–10.5)
CHLORIDE SERPL-SCNC: 96 MMOL/L (ref 98–107)
CO2 SERPL-SCNC: 25 MMOL/L (ref 22–29)
CREAT BLD-MCNC: 0.69 MG/DL (ref 0.76–1.27)
D-DIMER, QUANTITATIVE (MAD,POW, STR): 303 NG/ML (FEU) (ref 0–470)
DEPRECATED RDW RBC AUTO: 38.4 FL (ref 37–54)
EOSINOPHIL # BLD AUTO: 0.33 10*3/MM3 (ref 0–0.4)
EOSINOPHIL NFR BLD AUTO: 3.9 % (ref 0.3–6.2)
ERYTHROCYTE [DISTWIDTH] IN BLOOD BY AUTOMATED COUNT: 13.4 % (ref 12.3–15.4)
GFR SERPL CREATININE-BSD FRML MDRD: 127 ML/MIN/1.73
GLOBULIN UR ELPH-MCNC: 3.8 GM/DL
GLUCOSE BLD-MCNC: 288 MG/DL (ref 65–99)
HCT VFR BLD AUTO: 37.2 % (ref 37.5–51)
HGB BLD-MCNC: 12.9 G/DL (ref 13–17.7)
HOLD SPECIMEN: NORMAL
IMM GRANULOCYTES # BLD AUTO: 0.19 10*3/MM3 (ref 0–0.05)
IMM GRANULOCYTES NFR BLD AUTO: 2.2 % (ref 0–0.5)
INR PPP: 0.94 (ref 0.8–1.2)
LYMPHOCYTES # BLD AUTO: 1.53 10*3/MM3 (ref 0.7–3.1)
LYMPHOCYTES NFR BLD AUTO: 17.9 % (ref 19.6–45.3)
MCH RBC QN AUTO: 27.5 PG (ref 26.6–33)
MCHC RBC AUTO-ENTMCNC: 34.7 G/DL (ref 31.5–35.7)
MCV RBC AUTO: 79.3 FL (ref 79–97)
MONOCYTES # BLD AUTO: 0.75 10*3/MM3 (ref 0.1–0.9)
MONOCYTES NFR BLD AUTO: 8.8 % (ref 5–12)
NEUTROPHILS # BLD AUTO: 5.69 10*3/MM3 (ref 1.4–7)
NEUTROPHILS NFR BLD AUTO: 66.7 % (ref 42.7–76)
NRBC BLD AUTO-RTO: 0 /100 WBC (ref 0–0)
NT-PROBNP SERPL-MCNC: 35.2 PG/ML (ref 5–450)
PLATELET # BLD AUTO: 179 10*3/MM3 (ref 140–450)
PMV BLD AUTO: 9 FL (ref 6–12)
POTASSIUM BLD-SCNC: 3.9 MMOL/L (ref 3.5–5.2)
PROT SERPL-MCNC: 7.2 G/DL (ref 6–8.5)
PROTHROMBIN TIME: 12.4 SECONDS (ref 11.1–15.3)
RBC # BLD AUTO: 4.69 10*6/MM3 (ref 4.14–5.8)
SODIUM BLD-SCNC: 133 MMOL/L (ref 136–145)
TROPONIN T SERPL-MCNC: <0.01 NG/ML (ref 0–0.03)
TROPONIN T SERPL-MCNC: <0.01 NG/ML (ref 0–0.03)
WBC NRBC COR # BLD: 8.53 10*3/MM3 (ref 3.4–10.8)

## 2019-04-09 PROCEDURE — 71046 X-RAY EXAM CHEST 2 VIEWS: CPT

## 2019-04-09 PROCEDURE — 80053 COMPREHEN METABOLIC PANEL: CPT | Performed by: EMERGENCY MEDICINE

## 2019-04-09 PROCEDURE — 85025 COMPLETE CBC W/AUTO DIFF WBC: CPT | Performed by: EMERGENCY MEDICINE

## 2019-04-09 PROCEDURE — 84484 ASSAY OF TROPONIN QUANT: CPT | Performed by: EMERGENCY MEDICINE

## 2019-04-09 PROCEDURE — 83690 ASSAY OF LIPASE: CPT | Performed by: FAMILY MEDICINE

## 2019-04-09 PROCEDURE — 93005 ELECTROCARDIOGRAM TRACING: CPT

## 2019-04-09 PROCEDURE — 93005 ELECTROCARDIOGRAM TRACING: CPT | Performed by: EMERGENCY MEDICINE

## 2019-04-09 PROCEDURE — 96361 HYDRATE IV INFUSION ADD-ON: CPT

## 2019-04-09 PROCEDURE — 85379 FIBRIN DEGRADATION QUANT: CPT | Performed by: EMERGENCY MEDICINE

## 2019-04-09 PROCEDURE — 83880 ASSAY OF NATRIURETIC PEPTIDE: CPT | Performed by: EMERGENCY MEDICINE

## 2019-04-09 PROCEDURE — 82150 ASSAY OF AMYLASE: CPT | Performed by: FAMILY MEDICINE

## 2019-04-09 PROCEDURE — 85730 THROMBOPLASTIN TIME PARTIAL: CPT | Performed by: EMERGENCY MEDICINE

## 2019-04-09 PROCEDURE — 99284 EMERGENCY DEPT VISIT MOD MDM: CPT

## 2019-04-09 PROCEDURE — 36415 COLL VENOUS BLD VENIPUNCTURE: CPT | Performed by: EMERGENCY MEDICINE

## 2019-04-09 PROCEDURE — 85610 PROTHROMBIN TIME: CPT | Performed by: EMERGENCY MEDICINE

## 2019-04-09 PROCEDURE — 93010 ELECTROCARDIOGRAM REPORT: CPT | Performed by: INTERNAL MEDICINE

## 2019-04-09 RX ORDER — SODIUM CHLORIDE 0.9 % (FLUSH) 0.9 %
10 SYRINGE (ML) INJECTION AS NEEDED
Status: DISCONTINUED | OUTPATIENT
Start: 2019-04-09 | End: 2019-04-10 | Stop reason: HOSPADM

## 2019-04-09 RX ORDER — SODIUM CHLORIDE 9 MG/ML
125 INJECTION, SOLUTION INTRAVENOUS CONTINUOUS
Status: DISCONTINUED | OUTPATIENT
Start: 2019-04-09 | End: 2019-04-10 | Stop reason: HOSPADM

## 2019-04-09 RX ORDER — ONDANSETRON 2 MG/ML
4 INJECTION INTRAMUSCULAR; INTRAVENOUS ONCE
Status: COMPLETED | OUTPATIENT
Start: 2019-04-09 | End: 2019-04-10

## 2019-04-09 RX ORDER — ASPIRIN 81 MG/1
324 TABLET, CHEWABLE ORAL ONCE
Status: DISCONTINUED | OUTPATIENT
Start: 2019-04-09 | End: 2019-04-10 | Stop reason: HOSPADM

## 2019-04-09 RX ORDER — NITROGLYCERIN 0.4 MG/1
0.4 TABLET SUBLINGUAL
Status: DISCONTINUED | OUTPATIENT
Start: 2019-04-09 | End: 2019-04-10 | Stop reason: HOSPADM

## 2019-04-09 RX ADMIN — SODIUM CHLORIDE 125 ML/HR: 900 INJECTION, SOLUTION INTRAVENOUS at 20:36

## 2019-04-10 VITALS
SYSTOLIC BLOOD PRESSURE: 130 MMHG | HEART RATE: 70 BPM | TEMPERATURE: 98 F | WEIGHT: 315 LBS | HEIGHT: 71 IN | OXYGEN SATURATION: 94 % | DIASTOLIC BLOOD PRESSURE: 68 MMHG | RESPIRATION RATE: 20 BRPM | BODY MASS INDEX: 44.1 KG/M2

## 2019-04-10 LAB
AMYLASE SERPL-CCNC: 35 U/L (ref 28–100)
ANION GAP SERPL CALCULATED.3IONS-SCNC: 15 MMOL/L
BASOPHILS # BLD AUTO: 0.04 10*3/MM3 (ref 0–0.2)
BASOPHILS NFR BLD AUTO: 0.5 % (ref 0–1.5)
BUN BLD-MCNC: 12 MG/DL (ref 6–20)
BUN/CREAT SERPL: 18.2 (ref 7–25)
CALCIUM SPEC-SCNC: 9.2 MG/DL (ref 8.6–10.5)
CHLORIDE SERPL-SCNC: 94 MMOL/L (ref 98–107)
CO2 SERPL-SCNC: 26 MMOL/L (ref 22–29)
CREAT BLD-MCNC: 0.66 MG/DL (ref 0.76–1.27)
DEPRECATED RDW RBC AUTO: 38.6 FL (ref 37–54)
EOSINOPHIL # BLD AUTO: 0.36 10*3/MM3 (ref 0–0.4)
EOSINOPHIL NFR BLD AUTO: 4.3 % (ref 0.3–6.2)
ERYTHROCYTE [DISTWIDTH] IN BLOOD BY AUTOMATED COUNT: 13.5 % (ref 12.3–15.4)
GFR SERPL CREATININE-BSD FRML MDRD: 134 ML/MIN/1.73
GLUCOSE BLD-MCNC: 283 MG/DL (ref 65–99)
GLUCOSE BLDC GLUCOMTR-MCNC: 248 MG/DL (ref 70–130)
GLUCOSE BLDC GLUCOMTR-MCNC: 300 MG/DL (ref 70–130)
GLUCOSE BLDC GLUCOMTR-MCNC: 301 MG/DL (ref 70–130)
HCT VFR BLD AUTO: 36.4 % (ref 37.5–51)
HGB BLD-MCNC: 12.5 G/DL (ref 13–17.7)
IMM GRANULOCYTES # BLD AUTO: 0.19 10*3/MM3 (ref 0–0.05)
IMM GRANULOCYTES NFR BLD AUTO: 2.3 % (ref 0–0.5)
LIPASE SERPL-CCNC: 25 U/L (ref 13–60)
LYMPHOCYTES # BLD AUTO: 1.64 10*3/MM3 (ref 0.7–3.1)
LYMPHOCYTES NFR BLD AUTO: 19.5 % (ref 19.6–45.3)
MCH RBC QN AUTO: 27.5 PG (ref 26.6–33)
MCHC RBC AUTO-ENTMCNC: 34.3 G/DL (ref 31.5–35.7)
MCV RBC AUTO: 80 FL (ref 79–97)
MONOCYTES # BLD AUTO: 0.7 10*3/MM3 (ref 0.1–0.9)
MONOCYTES NFR BLD AUTO: 8.3 % (ref 5–12)
NEUTROPHILS # BLD AUTO: 5.5 10*3/MM3 (ref 1.4–7)
NEUTROPHILS NFR BLD AUTO: 65.1 % (ref 42.7–76)
NRBC BLD AUTO-RTO: 0 /100 WBC (ref 0–0)
PLATELET # BLD AUTO: 170 10*3/MM3 (ref 140–450)
PMV BLD AUTO: 9 FL (ref 6–12)
POTASSIUM BLD-SCNC: 4 MMOL/L (ref 3.5–5.2)
RBC # BLD AUTO: 4.55 10*6/MM3 (ref 4.14–5.8)
SODIUM BLD-SCNC: 135 MMOL/L (ref 136–145)
TROPONIN T SERPL-MCNC: <0.01 NG/ML (ref 0–0.03)
WBC NRBC COR # BLD: 8.43 10*3/MM3 (ref 3.4–10.8)

## 2019-04-10 PROCEDURE — 82962 GLUCOSE BLOOD TEST: CPT

## 2019-04-10 PROCEDURE — 84484 ASSAY OF TROPONIN QUANT: CPT | Performed by: EMERGENCY MEDICINE

## 2019-04-10 PROCEDURE — 85025 COMPLETE CBC W/AUTO DIFF WBC: CPT | Performed by: FAMILY MEDICINE

## 2019-04-10 PROCEDURE — 96374 THER/PROPH/DIAG INJ IV PUSH: CPT

## 2019-04-10 PROCEDURE — 63710000001 INSULIN ASPART PER 5 UNITS: Performed by: FAMILY MEDICINE

## 2019-04-10 PROCEDURE — 80048 BASIC METABOLIC PNL TOTAL CA: CPT | Performed by: FAMILY MEDICINE

## 2019-04-10 PROCEDURE — G0378 HOSPITAL OBSERVATION PER HR: HCPCS

## 2019-04-10 PROCEDURE — 63710000001 INSULIN REGULAR HUMAN PER 5 UNITS: Performed by: EMERGENCY MEDICINE

## 2019-04-10 PROCEDURE — 96361 HYDRATE IV INFUSION ADD-ON: CPT

## 2019-04-10 PROCEDURE — 25010000002 ONDANSETRON PER 1 MG: Performed by: EMERGENCY MEDICINE

## 2019-04-10 RX ORDER — ALBUTEROL SULFATE 2.5 MG/3ML
1.25 SOLUTION RESPIRATORY (INHALATION) EVERY 6 HOURS PRN
Status: DISCONTINUED | OUTPATIENT
Start: 2019-04-10 | End: 2019-04-10 | Stop reason: HOSPADM

## 2019-04-10 RX ORDER — SODIUM CHLORIDE 0.9 % (FLUSH) 0.9 %
3-10 SYRINGE (ML) INJECTION AS NEEDED
Status: DISCONTINUED | OUTPATIENT
Start: 2019-04-10 | End: 2019-04-10 | Stop reason: HOSPADM

## 2019-04-10 RX ORDER — NICOTINE POLACRILEX 4 MG
15 LOZENGE BUCCAL
Status: DISCONTINUED | OUTPATIENT
Start: 2019-04-10 | End: 2019-04-10 | Stop reason: HOSPADM

## 2019-04-10 RX ORDER — ACETAMINOPHEN 325 MG/1
650 TABLET ORAL EVERY 4 HOURS PRN
Status: DISCONTINUED | OUTPATIENT
Start: 2019-04-10 | End: 2019-04-10 | Stop reason: HOSPADM

## 2019-04-10 RX ORDER — AMLODIPINE BESYLATE 10 MG/1
10 TABLET ORAL NIGHTLY
Status: DISCONTINUED | OUTPATIENT
Start: 2019-04-10 | End: 2019-04-10 | Stop reason: HOSPADM

## 2019-04-10 RX ORDER — ONDANSETRON 4 MG/1
4 TABLET, ORALLY DISINTEGRATING ORAL EVERY 6 HOURS PRN
Status: DISCONTINUED | OUTPATIENT
Start: 2019-04-10 | End: 2019-04-10 | Stop reason: HOSPADM

## 2019-04-10 RX ORDER — SODIUM CHLORIDE 0.9 % (FLUSH) 0.9 %
3 SYRINGE (ML) INJECTION EVERY 12 HOURS SCHEDULED
Status: DISCONTINUED | OUTPATIENT
Start: 2019-04-10 | End: 2019-04-10 | Stop reason: HOSPADM

## 2019-04-10 RX ORDER — NEBIVOLOL 5 MG/1
5 TABLET ORAL 2 TIMES DAILY
Status: DISCONTINUED | OUTPATIENT
Start: 2019-04-10 | End: 2019-04-10 | Stop reason: HOSPADM

## 2019-04-10 RX ORDER — SERTRALINE HYDROCHLORIDE 25 MG/1
25 TABLET, FILM COATED ORAL NIGHTLY
Status: DISCONTINUED | OUTPATIENT
Start: 2019-04-10 | End: 2019-04-10 | Stop reason: HOSPADM

## 2019-04-10 RX ORDER — ATORVASTATIN CALCIUM 40 MG/1
80 TABLET, FILM COATED ORAL DAILY
Status: DISCONTINUED | OUTPATIENT
Start: 2019-04-10 | End: 2019-04-10 | Stop reason: HOSPADM

## 2019-04-10 RX ORDER — RANOLAZINE 500 MG/1
1000 TABLET, EXTENDED RELEASE ORAL EVERY 12 HOURS SCHEDULED
Status: DISCONTINUED | OUTPATIENT
Start: 2019-04-10 | End: 2019-04-10 | Stop reason: HOSPADM

## 2019-04-10 RX ORDER — FLUTICASONE PROPIONATE 50 MCG
2 SPRAY, SUSPENSION (ML) NASAL DAILY
Status: DISCONTINUED | OUTPATIENT
Start: 2019-04-10 | End: 2019-04-10 | Stop reason: HOSPADM

## 2019-04-10 RX ORDER — HYDROCODONE BITARTRATE AND ACETAMINOPHEN 5; 325 MG/1; MG/1
1 TABLET ORAL ONCE AS NEEDED
Status: DISCONTINUED | OUTPATIENT
Start: 2019-04-10 | End: 2019-04-10 | Stop reason: HOSPADM

## 2019-04-10 RX ORDER — TRAZODONE HYDROCHLORIDE 150 MG/1
300 TABLET ORAL NIGHTLY PRN
Status: DISCONTINUED | OUTPATIENT
Start: 2019-04-10 | End: 2019-04-10 | Stop reason: HOSPADM

## 2019-04-10 RX ORDER — PRASUGREL 10 MG/1
10 TABLET, FILM COATED ORAL DAILY
Status: DISCONTINUED | OUTPATIENT
Start: 2019-04-10 | End: 2019-04-10 | Stop reason: HOSPADM

## 2019-04-10 RX ORDER — BUDESONIDE AND FORMOTEROL FUMARATE DIHYDRATE 160; 4.5 UG/1; UG/1
2 AEROSOL RESPIRATORY (INHALATION)
Status: DISCONTINUED | OUTPATIENT
Start: 2019-04-10 | End: 2019-04-10 | Stop reason: HOSPADM

## 2019-04-10 RX ORDER — LISINOPRIL 40 MG/1
40 TABLET ORAL NIGHTLY
Status: DISCONTINUED | OUTPATIENT
Start: 2019-04-10 | End: 2019-04-10 | Stop reason: HOSPADM

## 2019-04-10 RX ORDER — ISOSORBIDE MONONITRATE 60 MG/1
120 TABLET, EXTENDED RELEASE ORAL NIGHTLY
Status: DISCONTINUED | OUTPATIENT
Start: 2019-04-10 | End: 2019-04-10 | Stop reason: HOSPADM

## 2019-04-10 RX ORDER — PRAMIPEXOLE DIHYDROCHLORIDE 1 MG/1
1 TABLET ORAL NIGHTLY
Status: DISCONTINUED | OUTPATIENT
Start: 2019-04-10 | End: 2019-04-10 | Stop reason: HOSPADM

## 2019-04-10 RX ORDER — DEXTROSE MONOHYDRATE 25 G/50ML
25 INJECTION, SOLUTION INTRAVENOUS
Status: DISCONTINUED | OUTPATIENT
Start: 2019-04-10 | End: 2019-04-10 | Stop reason: HOSPADM

## 2019-04-10 RX ADMIN — NEBIVOLOL HYDROCHLORIDE 5 MG: 5 TABLET ORAL at 08:09

## 2019-04-10 RX ADMIN — HUMAN INSULIN 4 UNITS: 100 INJECTION, SOLUTION SUBCUTANEOUS at 00:17

## 2019-04-10 RX ADMIN — LISINOPRIL 40 MG: 40 TABLET ORAL at 02:02

## 2019-04-10 RX ADMIN — ATORVASTATIN CALCIUM 80 MG: 40 TABLET, FILM COATED ORAL at 08:08

## 2019-04-10 RX ADMIN — FLUTICASONE PROPIONATE 2 SPRAY: 50 SPRAY, METERED NASAL at 08:09

## 2019-04-10 RX ADMIN — AMLODIPINE BESYLATE 10 MG: 10 TABLET ORAL at 08:09

## 2019-04-10 RX ADMIN — METOPROLOL TARTRATE 50 MG: 25 TABLET ORAL at 00:22

## 2019-04-10 RX ADMIN — ISOSORBIDE MONONITRATE 120 MG: 60 TABLET, EXTENDED RELEASE ORAL at 02:02

## 2019-04-10 RX ADMIN — RANOLAZINE 1000 MG: 500 TABLET, FILM COATED, EXTENDED RELEASE ORAL at 08:09

## 2019-04-10 RX ADMIN — NITROGLYCERIN 1 INCH: 20 OINTMENT TOPICAL at 06:38

## 2019-04-10 RX ADMIN — ONDANSETRON 4 MG: 2 INJECTION INTRAMUSCULAR; INTRAVENOUS at 00:18

## 2019-04-10 RX ADMIN — SODIUM CHLORIDE, PRESERVATIVE FREE 10 ML: 5 INJECTION INTRAVENOUS at 08:10

## 2019-04-10 RX ADMIN — PRASUGREL 10 MG: 10 TABLET, FILM COATED ORAL at 08:08

## 2019-04-10 RX ADMIN — INSULIN ASPART 16 UNITS: 100 INJECTION, SOLUTION INTRAVENOUS; SUBCUTANEOUS at 08:10

## 2019-04-10 RX ADMIN — PRAMIPEXOLE DIHYDROCHLORIDE 1 MG: 1 TABLET ORAL at 02:02

## 2019-04-10 RX ADMIN — RANOLAZINE 1000 MG: 500 TABLET, FILM COATED, EXTENDED RELEASE ORAL at 02:02

## 2019-04-10 NOTE — DISCHARGE SUMMARY
[facility] Medicine Services  DISCHARGE SUMMARY       Date of Admission: 4/9/2019  Date of Discharge:  4/10/2019  Primary Care Physician: Ruslan Gonzalez APRN    Presenting Problem/History of Present Illness:  Chest pain with high risk for cardiac etiology [R07.9]     Final Discharge Diagnoses:  Active Hospital Problems    Diagnosis   • **Chest pain with high risk for cardiac etiology   • CAD (coronary artery disease)   • Chronic pulmonary embolism (CMS/HCC)   • ADOLFO on CPAP       Consults:   Consults     Date and Time Order Name Status Description    4/9/2019 2339 Hospitalist (on-call MD unless specified)          Pertinent Test Results:   Lab Results (most recent)     Procedure Component Value Units Date/Time    POC Glucose Once [[id number]]  (Abnormal) Collected:  04/10/19 0610    Specimen:  Blood Updated:  04/10/19 0647     Glucose 301 mg/dL      Comment: RN NotifiedOperator: [id number] KAREEN STACKWNAMeter ID: [id number]       Basic Metabolic Panel [[id number]]  (Abnormal) Collected:  04/10/19 0345    Specimen:  Blood Updated:  04/10/19 0511     Glucose 283 mg/dL      BUN 12 mg/dL      Creatinine 0.66 mg/dL      Sodium 135 mmol/L      Potassium 4.0 mmol/L      Chloride 94 mmol/L      CO2 26.0 mmol/L      Calcium 9.2 mg/dL      eGFR Non African Amer 134 mL/min/1.73      BUN/Creatinine Ratio 18.2     Anion Gap 15.0 mmol/L     Narrative:       GFR Normal >60  Chronic Kidney Disease <60  Kidney Failure <15    Troponin [[id number]]  (Normal) Collected:  04/10/19 0345    Specimen:  Blood Updated:  04/10/19 0511     Troponin T <0.010 ng/mL     Narrative:       Troponin T Reference Range:  <= 0.03 ng/mL-   Negative for AMI  >0.03 ng/mL-     Abnormal for myocardial necrosis.  Clinicians would have to utilize clinical acumen, EKG, Troponin and serial changes to determine if it is an Acute Myocardial Infarction or myocardial injury due to an underlying chronic condition.     CBC &  Differential [[id number]] Collected:  04/10/19 0345    Specimen:  Blood Updated:  04/10/19 0459    Narrative:       The following orders were created for panel order CBC & Differential.  Procedure                               Abnormality         Status                     ---------                               -----------         ------                     CBC Auto Differential[[id number]]        Abnormal            Final result                 Please view results for these tests on the individual orders.    CBC Auto Differential [[id number]]  (Abnormal) Collected:  04/10/19 0345    Specimen:  Blood Updated:  04/10/19 0459     WBC 8.43 10*3/mm3      RBC 4.55 10*6/mm3      Hemoglobin 12.5 g/dL      Hematocrit 36.4 %      MCV 80.0 fL      MCH 27.5 pg      MCHC 34.3 g/dL      RDW 13.5 %      RDW-SD 38.6 fl      MPV 9.0 fL      Platelets 170 10*3/mm3      Neutrophil % 65.1 %      Lymphocyte % 19.5 %      Monocyte % 8.3 %      Eosinophil % 4.3 %      Basophil % 0.5 %      Immature Grans % 2.3 %      Neutrophils, Absolute 5.50 10*3/mm3      Lymphocytes, Absolute 1.64 10*3/mm3      Monocytes, Absolute 0.70 10*3/mm3      Eosinophils, Absolute 0.36 10*3/mm3      Basophils, Absolute 0.04 10*3/mm3      Immature Grans, Absolute 0.19 10*3/mm3      nRBC 0.0 /100 WBC     Lipase [[id number]]  (Normal) Collected:  04/09/19 2245    Specimen:  Blood from Arm, Left Updated:  04/10/19 0321     Lipase 25 U/L     Amylase [[id number]]  (Normal) Collected:  04/09/19 2245    Specimen:  Blood from Arm, Left Updated:  04/10/19 0321     Amylase 35 U/L     POC Glucose Once [[id number]]  (Abnormal) Collected:  04/10/19 0057    Specimen:  Blood Updated:  04/10/19 0110     Glucose 248 mg/dL      Comment: Result Not ConfirmedOperator: [id number] [facility] ID: [id number]       Troponin [[id number]]  (Normal) Collected:  04/09/19 2245    Specimen:  Blood from Arm, Left Updated:  04/09/19 2309     Troponin T <0.010 ng/mL     Narrative:        Troponin T Reference Range:  <= 0.03 ng/mL-   Negative for AMI  >0.03 ng/mL-     Abnormal for myocardial necrosis.  Clinicians would have to utilize clinical acumen, EKG, Troponin and serial changes to determine if it is an Acute Myocardial Infarction or myocardial injury due to an underlying chronic condition.     Extra Tubes [[id number]] Collected:  04/09/19 2032    Specimen:  Blood, Venous Line Updated:  04/09/19 2145    Narrative:       The following orders were created for panel order Extra Tubes.  Procedure                               Abnormality         Status                     ---------                               -----------         ------                     Gold Top - SST[[id number]]                                   Final result                 Please view results for these tests on the individual orders.    Gold Top - SST [[id number]] Collected:  04/09/19 2032    Specimen:  Blood Updated:  04/09/19 2145     Extra Tube Hold for add-ons.     Comment: Auto resulted.       Comprehensive Metabolic Panel [[id number]]  (Abnormal) Collected:  04/09/19 2024    Specimen:  Blood from Arm, Right Updated:  04/09/19 2055     Glucose 288 mg/dL      BUN 11 mg/dL      Creatinine 0.69 mg/dL      Sodium 133 mmol/L      Potassium 3.9 mmol/L      Chloride 96 mmol/L      CO2 25.0 mmol/L      Calcium 9.3 mg/dL      Total Protein 7.2 g/dL      Albumin 3.40 g/dL      ALT (SGPT) 21 U/L      AST (SGOT) 17 U/L      Alkaline Phosphatase 87 U/L      Total Bilirubin 0.2 mg/dL      eGFR Non African Amer 127 mL/min/1.73      Globulin 3.8 gm/dL      A/G Ratio 0.9 g/dL      BUN/Creatinine Ratio 15.9     Anion Gap 12.0 mmol/L     Narrative:       GFR Normal >60  Chronic Kidney Disease <60  Kidney Failure <15    Protime-INR [[id number]]  (Normal) Collected:  04/09/19 2024    Specimen:  Blood from Arm, Right Updated:  04/09/19 2054     Protime 12.4 Seconds      INR 0.94    Narrative:       Therapeutic range for most indications is 2.0-3.0  INR,  or 2.5-3.5 for mechanical heart valves.    aPTT [[id number]]  (Normal) Collected:  04/09/19 2024    Specimen:  Blood from Arm, Right Updated:  04/09/19 2054     PTT 32.4 seconds     Narrative:       The recommended Heparin therapeutic range is 68-97 seconds.    D-dimer, Quantitative [[id number]]  (Normal) Collected:  04/09/19 2024    Specimen:  Blood from Arm, Right Updated:  04/09/19 2054     D-Dimer, Quantitative 303 ng/mL (FEU)     Narrative:       Dimer values <500 ng/ml FEU are FDA approved as aid in diagnosis of deep venous thrombosis and pulmonary embolism.  This test should not be used in an exclusion strategy with pretest probability alone.    A recent guideline regarding diagnosis for pulmonary thromboembolism recommends an adjusted exclusion criterion of age x 10 ng/ml FEU for patients >50 years of age ([employer]ori Intern Med 2015; 163: 701-711).    BNP [[id number]]  (Normal) Collected:  04/09/19 2024    Specimen:  Blood from Arm, Right Updated:  04/09/19 2053     proBNP 35.2 pg/mL     Narrative:       Among patients with dyspnea, NT-proBNP is highly sensitive for the detection of acute congestive heart failure. In addition NT-proBNP of <300 pg/ml effectively rules out acute congestive heart failure with 99% negative predictive value.    CBC & Differential [[id number]] Collected:  04/09/19 2024    Specimen:  Blood Updated:  04/09/19 2034    Narrative:       The following orders were created for panel order CBC & Differential.  Procedure                               Abnormality         Status                     ---------                               -----------         ------                     CBC Auto Differential[[id number]]        Abnormal            Final result                 Please view results for these tests on the individual orders.    CBC Auto Differential [[id number]]  (Abnormal) Collected:  04/09/19 2024    Specimen:  Blood from Arm, Right Updated:  04/09/19 2034     WBC 8.53 10*3/mm3      RBC  4.69 10*6/mm3      Hemoglobin 12.9 g/dL      Hematocrit 37.2 %      MCV 79.3 fL      MCH 27.5 pg      MCHC 34.7 g/dL      RDW 13.4 %      RDW-SD 38.4 fl      MPV 9.0 fL      Platelets 179 10*3/mm3      Neutrophil % 66.7 %      Lymphocyte % 17.9 %      Monocyte % 8.8 %      Eosinophil % 3.9 %      Basophil % 0.5 %      Immature Grans % 2.2 %      Neutrophils, Absolute 5.69 10*3/mm3      Lymphocytes, Absolute 1.53 10*3/mm3      Monocytes, Absolute 0.75 10*3/mm3      Eosinophils, Absolute 0.33 10*3/mm3      Basophils, Absolute 0.04 10*3/mm3      Immature Grans, Absolute 0.19 10*3/mm3      nRBC 0.0 /100 WBC         Imaging Results (most recent)     Procedure Component Value Units Date/Time    XR Chest 2 View [[id number]] Collected:  04/09/19 2033     Updated:  04/09/19 2053    Narrative:         Chest 2 view on  4/9/2019     CLINICAL INDICATION: Chest pain    COMPARISON: 3/8/2019    FINDINGS: The lungs are clear. Cardiac, hilar and mediastinal  contours are within normal limits. Pulmonary vascularity is  within normal limits. No bony abnormality is noted.      Impression:       No active disease.    Electronically signed by:  Pelon Anderson  4/9/2019 8:52 PM CDT  Workstation: -[facility]:  The patient is a 38 y.o. male who presented to [facility] with chest pain.  Patient is well-known to the hospitalist staff for chronic chest pain.  He had a cardiac catheterization approximately 1 year ago that showed nonobstructive disease.  His d-dimer is negative, his cardiac enzymes are negative, no acute EKG changes.  Currently resting comfortably with no chest pain or shortness of air.  Patient will be discharged home to follow-up with his PCP and cardiologist.  He was instructed to return with any concerning or worsening symptoms.    Condition on Discharge: Stable, improved    Physical Exam on Discharge:  /76 (BP Location: Left arm, Patient Position: Lying)   Pulse 68   Temp  "97.9 °F (36.6 °C) (Temporal)   Resp 18   Ht 180.3 cm (71\")   Wt (!) 206 kg (453 lb 3.2 oz)   SpO2 94%   BMI 63.21 kg/m²   Physical Exam   Constitutional: He is oriented to person, place, and time. He appears well-developed and well-nourished.   HENT:   Head: Normocephalic and atraumatic.   Eyes: Conjunctivae are normal. Pupils are equal, round, and reactive to light.   Cardiovascular: Normal rate and regular rhythm.   Pulmonary/Chest: Effort normal and breath sounds normal. No stridor. No respiratory distress.   Abdominal: Soft. Bowel sounds are normal. He exhibits no distension. There is no tenderness.   Neurological: He is alert and oriented to person, place, and time.   Skin: Skin is warm and dry. Capillary refill takes less than 2 seconds.   Psychiatric: He has a normal mood and affect. His behavior is normal.     Discharge Disposition:  Home or Self Care    Discharge Medications:     Discharge Medications      Changes to Medications      Instructions Start Date   insulin aspart 100 UNIT/ML injection  Commonly known as:  novoLOG  What changed:  how much to take   25 Units, Subcutaneous, 3 Times Daily Before Meals      isosorbide mononitrate 120 MG 24 hr tablet  Commonly known as:  IMDUR  What changed:  when to take this   120 mg, Oral, Daily      lisinopril 40 MG tablet  Commonly known as:  PRINIVILZESTRIL  What changed:  when to take this   40 mg, Oral, Every Morning      rivaroxaban 20 MG tablet  Commonly known as:  XARELTO  What changed:  when to take this   20 mg, Oral, Daily      sertraline 25 MG tablet  Commonly known as:  ZOLOFT  What changed:  when to take this   25 mg, Oral, Daily         Continue These Medications      Instructions Start Date   albuterol sulfate  (90 Base) MCG/ACT inhaler  Commonly known as:  PROVENTIL HFA;VENTOLIN HFA;PROAIR HFA   2 puffs, Inhalation, Every 4 Hours PRN      albuterol 1.25 MG/3ML nebulizer solution  Commonly known as:  ACCUNEB   1 ampule, Nebulization, " "Every 6 Hours PRN      amLODIPine 10 MG tablet  Commonly known as:  NORVASC   10 mg, Oral, Nightly      atorvastatin 80 MG tablet  Commonly known as:  LIPITOR   80 mg, Oral      budesonide-formoterol 160-4.5 MCG/ACT inhaler  Commonly known as:  SYMBICORT   2 puffs, Inhalation, 2 Times Daily - RT      BYSTOLIC 10 MG tablet  Generic drug:  nebivolol   5 mg, 2 Times Daily      dicyclomine 20 MG tablet  Commonly known as:  BENTYL   20 mg, Oral, Every 6 Hours PRN      fenofibrate 145 MG tablet  Commonly known as:  TRICOR   145 mg, Oral, Daily      fluticasone 50 MCG/ACT nasal spray  Commonly known as:  FLONASE   2 sprays, Each Nare, Daily      glucose monitor monitoring kit   1 each, Does not apply, As Needed      MICROLET LANCETS misc   One touch delica lancets      nitroglycerin 0.4 MG SL tablet  Commonly known as:  NITROSTAT   0.4 mg, Sublingual, Every 5 Minutes PRN, Take no more than 3 doses in 15 minutes.      nystatin 122078 UNIT/GM powder  Commonly known as:  MYCOSTATIN   Topical, 2 Times Daily      ondansetron ODT 4 MG disintegrating tablet  Commonly known as:  ZOFRAN-ODT   4 mg, Oral, Every 6 Hours PRN      pantoprazole 40 MG EC tablet  Commonly known as:  PROTONIX   40 mg, Oral, Nightly      pramipexole 1 MG tablet  Commonly known as:  MIRAPEX   TAKE TWO TABLETS BY MOUTH EVERY NIGHT      prasugrel 10 MG tablet  Commonly known as:  EFFIENT   10 mg, Oral      ranolazine 1000 MG 12 hr tablet  Commonly known as:  RANEXA   1,000 mg, Oral, Every 12 Hours Scheduled      RELION INSULIN SYRINGE 31G X 15/64\" 0.5 ML misc  Generic drug:  Insulin Syringe-Needle U-100   No dose, route, or frequency recorded.      traZODone 300 MG tablet  Commonly known as:  DESYREL   TAKE ONE TABLET BY MOUTH EVERY NIGHT      TRESIBA FLEXTOUCH 100 UNIT/ML solution pen-injector injection  Generic drug:  insulin degludec   75 Units, Subcutaneous, Nightly      VICTOZA SC   Subcutaneous             Discharge Diet:   Diet Instructions     Diet: " Consistent Carbohydrate, Cardiac      Discharge Diet:   Consistent Carbohydrate  Cardiac             Activity at Discharge:   Activity Instructions     Activity as Tolerated            Discharge Care Plan/Instructions: Follow-up with cardiology, follow-up with PCP, return with any concerning or worsening symptoms.        This document has been electronically signed by HENNA Rodriguez on April 10, 2019 9:07 AM

## 2019-04-10 NOTE — H&P
HISTORY AND PHYSICAL  NAME: Yordy Hansen  : 1980  MRN: [id number]    DATE OF ADMISSION: 04/10/19    DATE & TIME SEEN: 04/10/19 1:32 AM    PCP: Ruslan Gonzalez APRN    CODE STATUS:   Code Status and Medical Interventions:   Ordered at: 04/10/19 0131     Level Of Support Discussed With:    Patient     Code Status:    CPR     Medical Interventions (Level of Support Prior to Arrest):    Full       CHIEF COMPLAINT Chest pain    HPI:  Yordy Hansen is a 38 y.o. male with medical history significant for CAD, PE, chronic angina, morbid obesity, chronic pain, and diabetes presents with chest pain.     Patient states that he developed some substernal chest pain today similar to his previous episodes of chest pain.  He had some associated shortness of air.  He also had some associated nausea.  No diaphoresis, vomiting.    Patient had negative cardiac markers in the emergency department x2 and a negative chest x-ray. D-dimer negative as well.    CONCURRENT MEDICAL HISTORY:  Past Medical History:   Diagnosis Date   • Asthma    • Chest pain    • Chronic back pain    • Coronary artery disease    • Diabetes mellitus (CMS/HCC)     10.6% was last A1C per patient   • Factor II deficiency (CMS/HCC)    • Fatty liver    • GERD (gastroesophageal reflux disease)    • Hyperlipidemia    • Hypertension    • Morbid obesity (CMS/HCC)    • Pulmonary embolism (CMS/HCC)    • RLS (restless legs syndrome)    • Seizures (CMS/HCC)     last one many years ago   • Sleep apnea     c-pap       PAST SURGICAL HISTORY:  Past Surgical History:   Procedure Laterality Date   • ADENOIDECTOMY     • CARDIAC CATHETERIZATION N/A 3/15/2017    Procedure: Left Heart Cath;  Surgeon: Edwige Briceno MD;  Location: [facility] CATH INVASIVE LOCATION;  Service:    • CARDIAC CATHETERIZATION N/A 3/15/2017    Procedure: Stent SOPHIA coronary;  Surgeon: Edwige Briceno MD;  Location: [facility] CATH INVASIVE LOCATION;  Service:    • CARDIAC CATHETERIZATION N/A 3/1/2018     "Procedure: Left Heart Cath;  Surgeon: Conor Parsons MD;  Location: [facility] CATH INVASIVE LOCATION;  Service:    • CHOLECYSTECTOMY      lap   • CORONARY ANGIOPLASTY WITH STENT PLACEMENT     • WI RT/LT HEART CATHETERS N/A 3/15/2017    Procedure: Percutaneous Coronary Intervention;  Surgeon: Edwige Briceno MD;  Location: [facility] CATH INVASIVE LOCATION;  Service: Cardiovascular   • TONSILLECTOMY     • TRANSESOPHAGEAL ECHOCARDIOGRAM (MONICA)         FAMILY HISTORY:  Family History   Problem Relation Age of Onset   • Heart disease Mother    • Hypertension Mother    • Hyperlipidemia Mother    • Coronary artery disease Mother    • Diabetes Mother    • Obesity Mother    • Kidney failure Mother    • Sleep apnea Father    • Cancer Paternal Grandfather         SOCIAL HISTORY:  Social History     Socioeconomic History   • Marital status:      Spouse name: Cori   • Number of children: 0   • Years of education: Not on file   • Highest education level: Not on file   Occupational History   • Occupation: Disabled   Tobacco Use   • Smoking status: Former Smoker     Packs/day: 0.25     Years: 0.50     Pack years: 0.12     Types: Cigarettes     Last attempt to quit:      Years since quittin.2   • Smokeless tobacco: Current User     Types: Snuff   • Tobacco comment: quit smoking 25 years ago; he does use snuff   Substance and Sexual Activity   • Alcohol use: No   • Drug use: No   • Sexual activity: Defer       HOME MEDICATIONS:  Prior to Admission medications    Medication Sig Start Date End Date Taking? Authorizing Provider   fenofibrate (TRICOR) 145 MG tablet Take 145 mg by mouth Daily.   Yes Provider, MD Latrice   RELION INSULIN SYRINGE 31G X 15/64\" 0.5 ML misc  18  Yes Provider, MD Latrice   albuterol (ACCUNEB) 1.25 MG/3ML nebulizer solution Take 3 mL by nebulization Every 6 (Six) Hours As Needed for Wheezing. 11/15/17   Tahmina Reid APRN   albuterol (PROVENTIL HFA;VENTOLIN HFA) 108 (90 BASE) MCG/ACT " inhaler Inhale 2 puffs Every 4 (Four) Hours As Needed for Wheezing.    Latrice Bolaños MD   amLODIPine (NORVASC) 10 MG tablet Take 1 tablet by mouth Every Night. 11/10/17   Earnest Perez MD   atorvastatin (LIPITOR) 80 MG tablet Take 80 mg by mouth. 7/29/18   Latrice Bolaños MD   budesonide-formoterol (SYMBICORT) 160-4.5 MCG/ACT inhaler Inhale 2 puffs 2 (Two) Times a Day. 12/15/18   Artur Santana APRN   BYSTOLIC 10 MG tablet 5 mg 2 (Two) Times a Day. 10/4/18   Latrice Bolaños MD   dicyclomine (BENTYL) 20 MG tablet Take 1 tablet by mouth Every 6 (Six) Hours As Needed (abdominal pain and cramping). 3/9/19   Asim Kumar MD   fluticasone (FLONASE) 50 MCG/ACT nasal spray 2 sprays by Each Nare route Daily. 12/16/18   Artur Santana APRN   glucose monitor monitoring kit 1 each As Needed (check blood glucose 3x daily). 1/22/18   Froylan Keyes APRN   insulin aspart (novoLOG) 100 UNIT/ML injection Inject 25 Units under the skin into the appropriate area as directed 3 (Three) Times a Day Before Meals.  Patient taking differently: Inject 30 Units under the skin into the appropriate area as directed 3 (Three) Times a Day Before Meals. 9/11/18   Suki Sharma APRN   insulin degludec (TRESIBA FLEXTOUCH) 100 UNIT/ML solution pen-injector injection Inject 75 Units under the skin into the appropriate area as directed Every Night.    Latrice Bolaños MD   isosorbide mononitrate (IMDUR) 120 MG 24 hr tablet Take 1 tablet by mouth Daily.  Patient taking differently: Take 120 mg by mouth Every Night. 9/10/18   Suki Sharma APRN   Liraglutide (VICTOZA SC) Inject  under the skin into the appropriate area as directed.    Latrice Bolaños MD   lisinopril (PRINIVIL,ZESTRIL) 40 MG tablet Take 1 tablet by mouth Every Morning.  Patient taking differently: Take 40 mg by mouth Every Night. 11/10/17   Earnest Perez MD   MICROLET LANCETS misc One touch delica lancets 4/2/18    Froylan Keyes APRN   nitroglycerin (NITROSTAT) 0.4 MG SL tablet Place 1 tablet under the tongue Every 5 (Five) Minutes As Needed for Chest Pain. Take no more than 3 doses in 15 minutes. 11/10/17   Earnest Perez MD   nystatin (MYCOSTATIN) 043527 UNIT/GM powder Apply  topically to the appropriate area as directed 2 (Two) Times a Day.  Patient not taking: Reported on 2/20/2019 2/4/19   Red Loya MD   ondansetron ODT (ZOFRAN-ODT) 4 MG disintegrating tablet Take 1 tablet by mouth Every 6 (Six) Hours As Needed for Nausea or Vomiting. 2/4/19   Red Loya MD   pantoprazole (PROTONIX) 40 MG EC tablet Take 1 tablet by mouth Every Night. 11/10/17   Earnest Perez MD   pramipexole (MIRAPEX) 1 MG tablet TAKE TWO TABLETS BY MOUTH EVERY NIGHT 6/21/18   Earnest Perez MD   prasugrel (EFFIENT) 10 MG tablet Take 10 mg by mouth. 9/13/18   ProviderLatrice MD   ranolazine (RANEXA) 1000 MG 12 hr tablet Take 1 tablet by mouth Every 12 (Twelve) Hours. 11/10/17   Earnest Perez MD   rivaroxaban (XARELTO) 20 MG tablet Take 1 tablet by mouth Daily.  Patient taking differently: Take 20 mg by mouth Daily With Dinner. 2/2/18   Letty Paige APRN   sertraline (ZOLOFT) 25 MG tablet Take 1 tablet by mouth Daily.  Patient taking differently: Take 25 mg by mouth Every Night. 11/10/17   Earnest Perez MD   traZODone (DESYREL) 300 MG tablet TAKE ONE TABLET BY MOUTH EVERY NIGHT 6/21/18   Earnest Perez MD   azithromycin (ZITHROMAX) 250 MG tablet Take 2 tablets the first day, then 1 tablet daily for 4 days.  Patient not taking: Reported on 2/20/2019 1/27/19 4/10/19  Asim Kumar MD   cephalexin (KEFLEX) 500 MG capsule Take 1 capsule by mouth 4 (Four) Times a Day.  Patient not taking: Reported on 2/20/2019 2/4/19 4/10/19  Red Loya MD   predniSONE (DELTASONE) 20 MG tablet Take 1 tablet by mouth Daily.  Patient not taking: Reported on 2/20/2019 1/27/19 4/10/19  José  Asim SUAREZ MD       ALLERGIES:  Glipizide; Reglan [metoclopramide]; Tramadol; and Risperidone    REVIEW OF SYSTEMS  Review of Systems   Constitutional: Positive for activity change and fatigue. Negative for appetite change and fever.   Respiratory: Negative for cough and shortness of breath.    Cardiovascular: Positive for chest pain. Negative for palpitations.   Gastrointestinal: Positive for nausea. Negative for abdominal pain, diarrhea and vomiting.   Genitourinary: Negative for difficulty urinating and dysuria.   Musculoskeletal: Positive for arthralgias and back pain. Negative for gait problem.   Skin: Negative for color change and rash.   Neurological: Negative for dizziness, weakness and headaches.   Psychiatric/Behavioral: Negative for agitation, confusion and sleep disturbance.       PHYSICAL EXAM:  Temp:  [97.9 °F (36.6 °C)-98.5 °F (36.9 °C)] 97.9 °F (36.6 °C)  Heart Rate:  [81-93] 81  Resp:  [18-24] 18  BP: (143-179)/(76-93) 166/93  Body mass index is 63.21 kg/m².     Physical Exam   Constitutional: He is oriented to person, place, and time. He appears well-developed and well-nourished. No distress.   Morbidly obese.   HENT:   Head: Normocephalic and atraumatic.   Right Ear: External ear normal.   Left Ear: External ear normal.   Nose: Nose normal.   Eyes: Conjunctivae are normal. Right eye exhibits no discharge. Left eye exhibits no discharge.   Neck: Neck supple. No thyromegaly present.   Cardiovascular: Normal rate, regular rhythm and normal heart sounds.   Pulmonary/Chest: Effort normal and breath sounds normal. No respiratory distress. He has no wheezes. He has no rales. He exhibits no tenderness.   Diminished bibasilar.   Abdominal: Soft. Bowel sounds are normal. He exhibits no distension and no mass. There is no tenderness. There is no rebound and no guarding.   Musculoskeletal: Normal range of motion. He exhibits no edema.   Neurological: He is alert and oriented to person, place, and time.   Skin:  Skin is warm and dry. No rash noted. He is not diaphoretic. No erythema. No pallor.   Psychiatric: He has a normal mood and affect. His behavior is normal.   Nursing note and vitals reviewed.      DIAGNOSTIC DATA:   Lab Results (last 24 hours)     Procedure Component Value Units Date/Time    POC Glucose Once [[id number]]  (Abnormal) Collected:  04/10/19 0057    Specimen:  Blood Updated:  04/10/19 0110     Glucose 248 mg/dL      Comment: Result Not ConfirmedOperator: [id number] EnterpriseMEME Reddy ID: [id number]       Troponin [[id number]]  (Normal) Collected:  04/09/19 2245    Specimen:  Blood from Arm, Left Updated:  04/09/19 2309     Troponin T <0.010 ng/mL     Narrative:       Troponin T Reference Range:  <= 0.03 ng/mL-   Negative for AMI  >0.03 ng/mL-     Abnormal for myocardial necrosis.  Clinicians would have to utilize clinical acumen, EKG, Troponin and serial changes to determine if it is an Acute Myocardial Infarction or myocardial injury due to an underlying chronic condition.     Extra Tubes [[id number]] Collected:  04/09/19 2032    Specimen:  Blood, Venous Line Updated:  04/09/19 2145    Narrative:       The following orders were created for panel order Extra Tubes.  Procedure                               Abnormality         Status                     ---------                               -----------         ------                     Gold Top - SST[[id number]]                                   Final result                 Please view results for these tests on the individual orders.    Gold Top - SST [[id number]] Collected:  04/09/19 2032    Specimen:  Blood Updated:  04/09/19 2145     Extra Tube Hold for add-ons.     Comment: Auto resulted.       Comprehensive Metabolic Panel [[id number]]  (Abnormal) Collected:  04/09/19 2024    Specimen:  Blood from Arm, Right Updated:  04/09/19 2055     Glucose 288 mg/dL      BUN 11 mg/dL      Creatinine 0.69 mg/dL      Sodium 133 mmol/L      Potassium 3.9 mmol/L       Chloride 96 mmol/L      CO2 25.0 mmol/L      Calcium 9.3 mg/dL      Total Protein 7.2 g/dL      Albumin 3.40 g/dL      ALT (SGPT) 21 U/L      AST (SGOT) 17 U/L      Alkaline Phosphatase 87 U/L      Total Bilirubin 0.2 mg/dL      eGFR Non African Amer 127 mL/min/1.73      Globulin 3.8 gm/dL      A/G Ratio 0.9 g/dL      BUN/Creatinine Ratio 15.9     Anion Gap 12.0 mmol/L     Narrative:       GFR Normal >60  Chronic Kidney Disease <60  Kidney Failure <15    Protime-INR [[id number]]  (Normal) Collected:  04/09/19 2024    Specimen:  Blood from Arm, Right Updated:  04/09/19 2054     Protime 12.4 Seconds      INR 0.94    Narrative:       Therapeutic range for most indications is 2.0-3.0 INR,  or 2.5-3.5 for mechanical heart valves.    aPTT [[id number]]  (Normal) Collected:  04/09/19 2024    Specimen:  Blood from Arm, Right Updated:  04/09/19 2054     PTT 32.4 seconds     Narrative:       The recommended Heparin therapeutic range is 68-97 seconds.    D-dimer, Quantitative [[id number]]  (Normal) Collected:  04/09/19 2024    Specimen:  Blood from Arm, Right Updated:  04/09/19 2054     D-Dimer, Quantitative 303 ng/mL (FEU)     Narrative:       Dimer values <500 ng/ml FEU are FDA approved as aid in diagnosis of deep venous thrombosis and pulmonary embolism.  This test should not be used in an exclusion strategy with pretest probability alone.    A recent guideline regarding diagnosis for pulmonary thromboembolism recommends an adjusted exclusion criterion of age x 10 ng/ml FEU for patients >50 years of age ([employer]ori Intern Med 2015; 163: 701-711).    Troponin [[id number]]  (Normal) Collected:  04/09/19 2024    Specimen:  Blood from Arm, Right Updated:  04/09/19 2054     Troponin T <0.010 ng/mL     Narrative:       Troponin T Reference Range:  <= 0.03 ng/mL-   Negative for AMI  >0.03 ng/mL-     Abnormal for myocardial necrosis.  Clinicians would have to utilize clinical acumen, EKG, Troponin and serial changes to determine if it is an  Acute Myocardial Infarction or myocardial injury due to an underlying chronic condition.     BNP [[id number]]  (Normal) Collected:  04/09/19 2024    Specimen:  Blood from Arm, Right Updated:  04/09/19 2053     proBNP 35.2 pg/mL     Narrative:       Among patients with dyspnea, NT-proBNP is highly sensitive for the detection of acute congestive heart failure. In addition NT-proBNP of <300 pg/ml effectively rules out acute congestive heart failure with 99% negative predictive value.    CBC & Differential [[id number]] Collected:  04/09/19 2024    Specimen:  Blood Updated:  04/09/19 2034    Narrative:       The following orders were created for panel order CBC & Differential.  Procedure                               Abnormality         Status                     ---------                               -----------         ------                     CBC Auto Differential[[id number]]        Abnormal            Final result                 Please view results for these tests on the individual orders.    CBC Auto Differential [[id number]]  (Abnormal) Collected:  04/09/19 2024    Specimen:  Blood from Arm, Right Updated:  04/09/19 2034     WBC 8.53 10*3/mm3      RBC 4.69 10*6/mm3      Hemoglobin 12.9 g/dL      Hematocrit 37.2 %      MCV 79.3 fL      MCH 27.5 pg      MCHC 34.7 g/dL      RDW 13.4 %      RDW-SD 38.4 fl      MPV 9.0 fL      Platelets 179 10*3/mm3      Neutrophil % 66.7 %      Lymphocyte % 17.9 %      Monocyte % 8.8 %      Eosinophil % 3.9 %      Basophil % 0.5 %      Immature Grans % 2.2 %      Neutrophils, Absolute 5.69 10*3/mm3      Lymphocytes, Absolute 1.53 10*3/mm3      Monocytes, Absolute 0.75 10*3/mm3      Eosinophils, Absolute 0.33 10*3/mm3      Basophils, Absolute 0.04 10*3/mm3      Immature Grans, Absolute 0.19 10*3/mm3      nRBC 0.0 /100 WBC         Estimated Creatinine Clearance: 257.6 mL/min (A) (by C-G formula based on SCr of 0.69 mg/dL (L)).     Imaging Results (last 24 hours)     Procedure Component  Value Units Date/Time    XR Chest 2 View [[id number]] Collected:  04/09/19 2033     Updated:  04/09/19 2053    Narrative:         Chest 2 view on  4/9/2019     CLINICAL INDICATION: Chest pain    COMPARISON: 3/8/2019    FINDINGS: The lungs are clear. Cardiac, hilar and mediastinal  contours are within normal limits. Pulmonary vascularity is  within normal limits. No bony abnormality is noted.      Impression:       No active disease.    Electronically signed by:  Pelon Anderson  4/9/2019 8:52 PM CDT  Workstation: [email or web address]          I reviewed the patient's new clinical results.    ASSESSMENT AND PLAN: This is a 38 y.o. male with:    Active Hospital Problems    Diagnosis POA   • **Chest pain with high risk for cardiac etiology [R07.9] Yes   • CAD (coronary artery disease) [I25.10] Yes   • Chronic pulmonary embolism (CMS/HCC) [I27.82] Yes   • ADOLFO on CPAP [G47.33, Z99.89] Not Applicable       #.  Chest pain.  Patient with chronic angina.  Patient does have a history of CAD.  Trend cardiac enzymes.  Amylase lipase ordered.  Consult cardiology as needed.  Patient had cardiac cath approximately 1 year ago with widely patent LAD stent.  #.  History of PE.  Continue Xarelto.  #.  Diabetes mellitus type 2.  Patient given basal and sliding scale insulin.  #.  Hypertension.  Continue Norvasc, Imdur, lisinopril, Bystolic.  #.  CAD.  Prasugrel. Xarelto. Statin. Ranexa. Imdur.  #.  Morbid obesity. Dietary  Body mass index is 63.21 kg/m².  #.  COPD. Not in acute exacerbation.  #.  ADOLFO. CPAP.      Will monitor patient's hospital course and adjust treatment as hospital course dictates.    DVT prophylaxis: Xarelto  Code status is   Code Status and Medical Interventions:   Ordered at: 04/10/19 0131     Level Of Support Discussed With:    Patient     Code Status:    CPR     Medical Interventions (Level of Support Prior to Arrest):    Full      CARY # [id number], reviewed and consistent with patient reported medications.      I  discussed the patients findings and my recommendations with patient and nursing staff.           This document has been electronically signed by Yared Mcnulty MD on April 10, 2019 1:32 AM

## 2019-04-10 NOTE — ED PROVIDER NOTES
"Subjective   39yo male pmh significant htn/hyperlipidemia/dm2/cad/pe/david/factor II deficiency presents ED c/o onset substernal chest pain 1400hrs at rest characterized as \"sharp\"/radiating LUE/associated nausea/neg exac factors/relieved sl ntg x3; pt rates pain 7/10 at time of exam.        History provided by:  Patient  Chest Pain   Pain location:  Substernal area  Pain quality: sharp    Pain radiates to:  L arm  Pain severity:  Moderate  Onset quality:  Sudden  Duration:  1 day  Relieved by:  Nitroglycerin  Worsened by:  Nothing  Associated symptoms: nausea and shortness of breath    Associated symptoms: no abdominal pain, no cough and no palpitations        Review of Systems   Constitutional: Negative.    HENT: Negative.    Respiratory: Positive for shortness of breath. Negative for cough.    Cardiovascular: Positive for chest pain. Negative for palpitations and leg swelling.   Gastrointestinal: Positive for nausea. Negative for abdominal pain.   Genitourinary: Negative.    Musculoskeletal: Negative.    Skin: Negative.    Neurological: Negative for syncope.   All other systems reviewed and are negative.      Past Medical History:   Diagnosis Date   • Asthma    • Chest pain    • Chronic back pain    • Coronary artery disease    • Diabetes mellitus (CMS/HCC)     10.6% was last A1C per patient   • Factor II deficiency (CMS/HCC)    • Fatty liver    • GERD (gastroesophageal reflux disease)    • Hyperlipidemia    • Hypertension    • Morbid obesity (CMS/HCC)    • Pulmonary embolism (CMS/HCC)    • RLS (restless legs syndrome)    • Seizures (CMS/HCC)     last one many years ago   • Sleep apnea     c-pap       Allergies   Allergen Reactions   • Glipizide Other (See Comments) and Unknown (See Comments)     Slurred Speech  Slurred Speech  Hallucinations, Slurred Speech   • Reglan [Metoclopramide] Anxiety   • Tramadol Other (See Comments)     seizures   • Risperidone Other (See Comments)     Slurred speech  Can't stand, " trouble breathing, slurred speech         Past Surgical History:   Procedure Laterality Date   • ADENOIDECTOMY     • CARDIAC CATHETERIZATION N/A 3/15/2017    Procedure: Left Heart Cath;  Surgeon: Edwige Briceno MD;  Location: [facility] CATH INVASIVE LOCATION;  Service:    • CARDIAC CATHETERIZATION N/A 3/15/2017    Procedure: Stent SOPHIA coronary;  Surgeon: Edwige Briceno MD;  Location: [facility] CATH INVASIVE LOCATION;  Service:    • CARDIAC CATHETERIZATION N/A 3/1/2018    Procedure: Left Heart Cath;  Surgeon: Conor Parsons MD;  Location: [facility] CATH INVASIVE LOCATION;  Service:    • CHOLECYSTECTOMY      lap   • CORONARY ANGIOPLASTY WITH STENT PLACEMENT     • DC RT/LT HEART CATHETERS N/A 3/15/2017    Procedure: Percutaneous Coronary Intervention;  Surgeon: Edwige Briceno MD;  Location: [facility] CATH INVASIVE LOCATION;  Service: Cardiovascular   • TONSILLECTOMY     • TRANSESOPHAGEAL ECHOCARDIOGRAM (MONICA)         Family History   Problem Relation Age of Onset   • Heart disease Mother    • Hypertension Mother    • Hyperlipidemia Mother    • Coronary artery disease Mother    • Diabetes Mother    • Obesity Mother    • Kidney failure Mother    • Sleep apnea Father    • Cancer Paternal Grandfather        Social History     Socioeconomic History   • Marital status:      Spouse name: Cori   • Number of children: 0   • Years of education: Not on file   • Highest education level: Not on file   Occupational History   • Occupation: Disabled   Tobacco Use   • Smoking status: Former Smoker     Packs/day: 0.25     Years: 0.50     Pack years: 0.12     Types: Cigarettes     Last attempt to quit:      Years since quittin.2   • Smokeless tobacco: Current User     Types: Snuff   • Tobacco comment: quit smoking 25 years ago; he does use snuff   Substance and Sexual Activity   • Alcohol use: No   • Drug use: No   • Sexual activity: Defer           Objective   Physical Exam   Constitutional: He is oriented to person, place, and  time. He appears well-developed and well-nourished.   HENT:   Head: Normocephalic and atraumatic.   Mouth/Throat: Oropharynx is clear and moist.   Eyes: Pupils are equal, round, and reactive to light.   Neck: Neck supple. No JVD present. No tracheal deviation present.   Cardiovascular: Normal rate, regular rhythm, normal heart sounds and intact distal pulses. Exam reveals no gallop and no friction rub.   No murmur heard.  Pulmonary/Chest: Effort normal and breath sounds normal. He has no wheezes. He has no rales.   Abdominal: Soft. Bowel sounds are normal. There is no tenderness. There is no guarding.   Musculoskeletal: He exhibits no edema.   Lymphadenopathy:     He has no cervical adenopathy.   Neurological: He is alert and oriented to person, place, and time.   Skin: Skin is warm and dry.   Nursing note and vitals reviewed.      ECG 12 Lead    Date/Time: 4/9/2019 11:35 PM  Performed by: Seth Mcgarry MD  Authorized by: Seth Mcgarry MD   Interpreted by physician  Rhythm: sinus rhythm  Rate: normal  BPM: 84  QRS axis: normal  Conduction: conduction normal  ST Segments: ST segments normal  T Waves: T waves normal  Other: no other findings  Clinical impression: normal ECG                 ED Course      Labs Reviewed   COMPREHENSIVE METABOLIC PANEL - Abnormal; Notable for the following components:       Result Value    Glucose 288 (*)     Creatinine 0.69 (*)     Sodium 133 (*)     Chloride 96 (*)     Albumin 3.40 (*)     All other components within normal limits    Narrative:     GFR Normal >60  Chronic Kidney Disease <60  Kidney Failure <15   CBC WITH AUTO DIFFERENTIAL - Abnormal; Notable for the following components:    Hemoglobin 12.9 (*)     Hematocrit 37.2 (*)     Lymphocyte % 17.9 (*)     Immature Grans % 2.2 (*)     Immature Grans, Absolute 0.19 (*)     All other components within normal limits   BASIC METABOLIC PANEL - Abnormal; Notable for the following components:    Glucose 283 (*)     Creatinine 0.66  (*)     Sodium 135 (*)     Chloride 94 (*)     All other components within normal limits    Narrative:     GFR Normal >60  Chronic Kidney Disease <60  Kidney Failure <15   CBC WITH AUTO DIFFERENTIAL - Abnormal; Notable for the following components:    Hemoglobin 12.5 (*)     Hematocrit 36.4 (*)     Lymphocyte % 19.5 (*)     Immature Grans % 2.3 (*)     Immature Grans, Absolute 0.19 (*)     All other components within normal limits   POCT GLUCOSE FINGERSTICK - Abnormal; Notable for the following components:    Glucose 248 (*)     All other components within normal limits   POCT GLUCOSE FINGERSTICK - Abnormal; Notable for the following components:    Glucose 301 (*)     All other components within normal limits   POCT GLUCOSE FINGERSTICK - Abnormal; Notable for the following components:    Glucose 300 (*)     All other components within normal limits   PROTIME-INR - Normal    Narrative:     Therapeutic range for most indications is 2.0-3.0 INR,  or 2.5-3.5 for mechanical heart valves.   APTT - Normal    Narrative:     The recommended Heparin therapeutic range is 68-97 seconds.   TROPONIN (IN-HOUSE) - Normal    Narrative:     Troponin T Reference Range:  <= 0.03 ng/mL-   Negative for AMI  >0.03 ng/mL-     Abnormal for myocardial necrosis.  Clinicians would have to utilize clinical acumen, EKG, Troponin and serial changes to determine if it is an Acute Myocardial Infarction or myocardial injury due to an underlying chronic condition.    TROPONIN (IN-HOUSE) - Normal    Narrative:     Troponin T Reference Range:  <= 0.03 ng/mL-   Negative for AMI  >0.03 ng/mL-     Abnormal for myocardial necrosis.  Clinicians would have to utilize clinical acumen, EKG, Troponin and serial changes to determine if it is an Acute Myocardial Infarction or myocardial injury due to an underlying chronic condition.    TROPONIN (IN-HOUSE) - Normal    Narrative:     Troponin T Reference Range:  <= 0.03 ng/mL-   Negative for AMI  >0.03 ng/mL-      Abnormal for myocardial necrosis.  Clinicians would have to utilize clinical acumen, EKG, Troponin and serial changes to determine if it is an Acute Myocardial Infarction or myocardial injury due to an underlying chronic condition.    BNP (IN-HOUSE) - Normal    Narrative:     Among patients with dyspnea, NT-proBNP is highly sensitive for the detection of acute congestive heart failure. In addition NT-proBNP of <300 pg/ml effectively rules out acute congestive heart failure with 99% negative predictive value.   D-DIMER, QUANTITATIVE - Normal    Narrative:     Dimer values <500 ng/ml FEU are FDA approved as aid in diagnosis of deep venous thrombosis and pulmonary embolism.  This test should not be used in an exclusion strategy with pretest probability alone.    A recent guideline regarding diagnosis for pulmonary thromboembolism recommends an adjusted exclusion criterion of age x 10 ng/ml FEU for patients >50 years of age (Lori Intern Med 2015; 163: 701-711).   AMYLASE - Normal   LIPASE - Normal   POCT GLUCOSE FINGERSTICK   POCT GLUCOSE FINGERSTICK   POCT GLUCOSE FINGERSTICK   POCT GLUCOSE FINGERSTICK   POCT GLUCOSE FINGERSTICK   POCT GLUCOSE FINGERSTICK   CBC AND DIFFERENTIAL    Narrative:     The following orders were created for panel order CBC & Differential.  Procedure                               Abnormality         Status                     ---------                               -----------         ------                     CBC Auto Differential[[id number]]        Abnormal            Final result                 Please view results for these tests on the individual orders.   EXTRA TUBES    Narrative:     The following orders were created for panel order Extra Tubes.  Procedure                               Abnormality         Status                     ---------                               -----------         ------                     Gold Top - SST[[id number]]                                   Final result                  Please view results for these tests on the individual orders.   GOLD TOP - [facility]   CBC AND DIFFERENTIAL    Narrative:     The following orders were created for panel order CBC & Differential.  Procedure                               Abnormality         Status                     ---------                               -----------         ------                     CBC Auto Differential[[id number]]        Abnormal            Final result                 Please view results for these tests on the individual orders.     Xr Chest 2 View    Result Date: 4/9/2019  Narrative: Chest 2 view on  4/9/2019 CLINICAL INDICATION: Chest pain COMPARISON: 3/8/2019 FINDINGS: The lungs are clear. Cardiac, hilar and mediastinal contours are within normal limits. Pulmonary vascularity is within normal limits. No bony abnormality is noted.     Impression: No active disease. Electronically signed by:  Pelon Anderson  4/9/2019 8:52 PM CDT Workstation: [id number]              HEART Score (for prediction of 6-week risk of major adverse cardiac event) reviewed and/or performed as part of the patient evaluation and treatment planning process.  The result associated with this review/performance is: 4       MDM      Final diagnoses:   Chest pain with high risk for cardiac etiology            Seth Mcgarry MD  04/10/19 4819

## 2019-04-10 NOTE — PROGRESS NOTES
Discharge Planning Assessment  [facility]     Patient Name: Yordy Hansen  MRN: [id number]  Today's Date: 4/10/2019    Admit Date: 4/9/2019    Discharge Needs Assessment     Row Name 04/10/19 0857       Living Environment    Lives With  spouse    Current Living Arrangements  home/apartment/condo Information on face sheet confirmed with patient.     Primary Care Provided by  self    Provides Primary Care For  no one    Family Caregiver if Needed  spouse    Quality of Family Relationships  helpful;involved    Able to Return to Prior Arrangements  yes       Resource/Environmental Concerns    Resource/Environmental Concerns  none    Transportation Concerns  car, none       Transition Planning    Transportation Anticipated  family or friend will provide If patient is unable to drive, patient's spouse assists with transportation.        Discharge Needs Assessment    Readmission Within the Last 30 Days  no previous admission in last 30 days    Concerns to be Addressed  no discharge needs identified    Equipment Currently Used at Home  cane, straight;nebulizer;glucometer;bipap/cpap DME provided by [facility] [employer].     Anticipated Changes Related to Illness  none    Equipment Needed After Discharge  none    Discharge Facility/Level of Care Needs  home with home health    Offered/Gave Vendor List  yes;other (see comments) hx: services    Patient's Choice of Community Agency(s)  [facility]    Current Discharge Risk  chronically ill        Discharge Plan     Row Name 04/10/19 0903       Plan    Plan  home with no services    Plan Comments  LACE complete. Patient denies need for a home health: transition visit. Continue with medical management.....Amber CHACKO    Row Name 04/10/19 0852       Plan    Plan Comments  Continue stay - Troponins x3 negative, anticipate d/c today.  GARY Ariza        Destination      No service coordination in this encounter.      Durable Medical Equipment      No service  coordination in this encounter.      Dialysis/Infusion      No service coordination in this encounter.      Home Medical Care      No service coordination in this encounter.      Therapy      No service coordination in this encounter.      Community Resources      No service coordination in this encounter.          Demographic Summary     Row Name 04/10/19 0854       General Information    Admission Type  observation    Arrived From  emergency department    Referral Source  high risk screening    Preferred Language  English     Used During This Interaction  no       Contact Information    Contact Information Obtained for          Functional Status     Row Name 04/10/19 0855       Functional Status    Usual Activity Tolerance  good    Current Activity Tolerance  moderate    Functional Status Comments  Patient is independent with ADL's. Patient uses a straight cane at times secondary to back pain (chronic),        Functional Status, IADL    Medications  independent Pharmacy, [facility], and coverage confirmed.     Meal Preparation  independent    Housekeeping  independent    Laundry  independent    Shopping  independent       Mental Status Summary    Recent Changes in Mental Status/Cognitive Functioning  no changes       Employment/    Employment Status  disabled        Psychosocial    No documentation.       Abuse/Neglect    No documentation.       Legal    No documentation.       Substance Abuse    No documentation.       Patient Forms    No documentation.           GINNA Dubois

## 2019-04-10 NOTE — CONSULTS
Adult Nutrition  Assessment    Patient Name:  Yordy Hansen  YOB: 1980  MRN: [id number]  Admit Date:  4/9/2019    Assessment Date:  4/10/2019    Comments:  BMI 63 with pt at 273% IBW.  Making Lifestyle Changes for a Healthier Weight used to provide ed regarding Wt Loss Diet and diet copy given.  Low Fat Diet info requested by pt due to dx pancreatitis.  Info provided.  Pt discharged.    Reason for Assessment     Row Name 04/10/19 1357          Reason for Assessment    Reason For Assessment  per organizational policy Wt Loss Diet ed     Diagnosis  other (see comments) dx Morbid Obesity     Identified At Risk by Screening Criteria  BMI;need for education                 Estimated/Assessed Needs     Row Name 04/10/19 1180          Calculation Measurements    Weight Used For Calculations  108 kg (238 lb 1.6 oz)        Estimated/Assessed Needs    Additional Documentation  Calorie Requirements (Group);Fluid Requirements (Group);Goodspring-St. Jeor Equation (Group);Protein Requirements (Group)        Calorie Requirements    Estimated Calorie Requirement (kcal/day)  2615     Estimated Calorie Need Method  Goodspring-St Jeor        KCAL/KG    14 Kcal/Kg (kcal)  1512     15 Kcal/Kg (kcal)  1620     18 Kcal/Kg (kcal)  1944     20 Kcal/Kg (kcal)  2160     25 Kcal/Kg (kcal)  2700     30 Kcal/Kg (kcal)  3240     35 Kcal/Kg (kcal)  3780     40 Kcal/Kg (kcal)  4320     45 Kcal/Kg (kcal)  4860     50 Kcal/Kg (kcal)  5400        Goodspring-St. Jeor Equation    RMR (Goodspring-St. Jeor Equation)  2012.13        Protein Requirements    Est Protein Requirement Amount (gms/kg)  1.2 gm protein     Estimated Protein Requirements (gms/day)  129.6        Fluid Requirements    Estimated Fluid Requirements (mL/day)  3242     RDA Method (mL)  3242     Amelia-Segar Method (over 20 kg)  3660           Evaluation of Received Nutrient/Fluid Intake     Row Name 04/10/19 7270          Calculation Measurements    Weight Used For Calculations   108 kg (238 lb 1.6 oz)         Evaluation of Prescribed Nutrient/Fluid Intake     Row Name 04/10/19 1354          Calculation Measurements    Weight Used For Calculations  108 kg (238 lb 1.6 oz)             Electronically signed by:  Ananya Oglesby RD  04/10/19 2:01 PM

## 2019-04-11 ENCOUNTER — EPISODE CHANGES (OUTPATIENT)
Dept: CASE MANAGEMENT | Facility: OTHER | Age: 39
End: 2019-04-11

## 2019-04-11 ENCOUNTER — READMISSION MANAGEMENT (OUTPATIENT)
Dept: CALL CENTER | Facility: HOSPITAL | Age: 39
End: 2019-04-11

## 2019-04-11 NOTE — OUTREACH NOTE
Prep Survey      Responses   Facility patient discharged from?  [facility]   Is patient eligible?  Yes   Discharge diagnosis  Chest pain with high risk for cardiac etiology, CAD, Chronic pulmonary embolism, ADOLFO on CPAP   Does the patient have one of the following disease processes/diagnoses(primary or secondary)?  Other   Does the patient have Home health ordered?  No   Is there a DME ordered?  No   Comments regarding appointments  See AVS   Prep survey completed?  Yes          Radha Merino RN

## 2019-04-15 ENCOUNTER — READMISSION MANAGEMENT (OUTPATIENT)
Dept: CALL CENTER | Facility: HOSPITAL | Age: 39
End: 2019-04-15

## 2019-04-15 NOTE — OUTREACH NOTE
Medical Week 1 Survey      Responses   Facility patient discharged from?  [facility]   Does the patient have one of the following disease processes/diagnoses(primary or secondary)?  Other   Is there a successful TCM telephone encounter documented?  No   Week 1 attempt successful?  No   Unsuccessful attempts  Attempt 1          Victor Hugo Dc RN

## 2019-04-17 ENCOUNTER — READMISSION MANAGEMENT (OUTPATIENT)
Dept: CALL CENTER | Facility: HOSPITAL | Age: 39
End: 2019-04-17

## 2019-04-17 NOTE — OUTREACH NOTE
Medical Week 1 Survey      Responses   Facility patient discharged from?  [place] [facility]   Does the patient have one of the following disease processes/diagnoses(primary or secondary)?  Other   Is there a successful TCM telephone encounter documented?  No   Week 1 attempt successful?  No   Unsuccessful attempts  Attempt 2          Ifeoma Gurrola RN

## 2019-04-18 ENCOUNTER — READMISSION MANAGEMENT (OUTPATIENT)
Dept: CALL CENTER | Facility: HOSPITAL | Age: 39
End: 2019-04-18

## 2019-04-18 NOTE — OUTREACH NOTE
Medical Week 2 Survey      Responses   Facility patient discharged from?  [facility]   Does the patient have one of the following disease processes/diagnoses(primary or secondary)?  Other   Week 2 attempt successful?  No   Unsuccessful attempts  Attempt 3          Iona Sanchez RN

## 2019-04-22 ENCOUNTER — EPISODE CHANGES (OUTPATIENT)
Dept: CASE MANAGEMENT | Facility: OTHER | Age: 39
End: 2019-04-22

## 2019-04-23 ENCOUNTER — PATIENT OUTREACH (OUTPATIENT)
Dept: CASE MANAGEMENT | Facility: OTHER | Age: 39
End: 2019-04-23

## 2019-05-30 ENCOUNTER — APPOINTMENT (OUTPATIENT)
Dept: WOUND CARE | Facility: HOSPITAL | Age: 39
End: 2019-05-30

## 2019-06-12 ENCOUNTER — PATIENT OUTREACH (OUTPATIENT)
Dept: CASE MANAGEMENT | Facility: OTHER | Age: 39
End: 2019-06-12

## 2019-06-12 NOTE — OUTREACH NOTE
Care Coordination Note    Notified [facility] office of 4 unsuccessful outreach attempts. Provided my contact information.     Bella Julian RN    6/12/2019, 9:31 AM

## 2019-06-14 ENCOUNTER — HOSPITAL ENCOUNTER (OUTPATIENT)
Facility: HOSPITAL | Age: 39
Setting detail: OBSERVATION
Discharge: HOME | End: 2019-06-15
Attending: EMERGENCY MEDICINE | Admitting: FAMILY MEDICINE

## 2019-06-14 ENCOUNTER — APPOINTMENT (OUTPATIENT)
Dept: GENERAL RADIOLOGY | Facility: HOSPITAL | Age: 39
End: 2019-06-14

## 2019-06-14 DIAGNOSIS — R07.9 CHEST PAIN, RULE OUT ACUTE MYOCARDIAL INFARCTION: Primary | ICD-10-CM

## 2019-06-14 DIAGNOSIS — R73.9 HYPERGLYCEMIA: ICD-10-CM

## 2019-06-14 PROBLEM — R10.13 EPIGASTRIC PAIN: Status: RESOLVED | Noted: 2017-07-27 | Resolved: 2019-06-14

## 2019-06-14 PROBLEM — S91.319A FOOT LACERATION: Status: RESOLVED | Noted: 2018-03-08 | Resolved: 2019-06-14

## 2019-06-14 PROBLEM — G44.209 TENSION TYPE HEADACHE: Status: RESOLVED | Noted: 2018-03-08 | Resolved: 2019-06-14

## 2019-06-14 PROBLEM — G24.01 TARDIVE DYSKINESIA: Status: RESOLVED | Noted: 2018-03-08 | Resolved: 2019-06-14

## 2019-06-14 PROBLEM — N30.80 BACTERIAL CYSTITIS: Status: RESOLVED | Noted: 2018-01-28 | Resolved: 2019-06-14

## 2019-06-14 PROBLEM — R51.9 HEADACHE: Status: RESOLVED | Noted: 2018-03-08 | Resolved: 2019-06-14

## 2019-06-14 PROBLEM — B37.2 CANDIDAL INTERTRIGO: Status: RESOLVED | Noted: 2018-01-18 | Resolved: 2019-06-14

## 2019-06-14 PROBLEM — J40 BRONCHITIS: Status: RESOLVED | Noted: 2018-12-14 | Resolved: 2019-06-14

## 2019-06-14 PROBLEM — K62.5 GASTROINTESTINAL HEMORRHAGE ASSOCIATED WITH ANORECTAL SOURCE: Status: RESOLVED | Noted: 2018-01-26 | Resolved: 2019-06-14

## 2019-06-14 PROBLEM — M79.606 LOWER EXTREMITY PAIN: Status: RESOLVED | Noted: 2018-03-08 | Resolved: 2019-06-14

## 2019-06-14 PROBLEM — J06.9 URI (UPPER RESPIRATORY INFECTION): Status: RESOLVED | Noted: 2018-12-14 | Resolved: 2019-06-14

## 2019-06-14 PROBLEM — J96.01 ACUTE RESPIRATORY FAILURE WITH HYPOXIA (HCC): Status: RESOLVED | Noted: 2018-12-07 | Resolved: 2019-06-14

## 2019-06-14 PROBLEM — A41.9 SEPSIS: Status: RESOLVED | Noted: 2018-12-13 | Resolved: 2019-06-14

## 2019-06-14 PROBLEM — J16.0 PNEUMONIA OF RIGHT LOWER LOBE DUE TO CHLAMYDIA SPECIES: Status: RESOLVED | Noted: 2018-12-06 | Resolved: 2019-06-14

## 2019-06-14 PROBLEM — H57.89 EYE IRRITATION: Status: RESOLVED | Noted: 2018-03-08 | Resolved: 2019-06-14

## 2019-06-14 PROBLEM — E87.20 LACTIC ACIDOSIS: Status: RESOLVED | Noted: 2018-12-14 | Resolved: 2019-06-14

## 2019-06-14 PROBLEM — I10 ACCELERATED HYPERTENSION: Status: ACTIVE | Noted: 2019-06-14

## 2019-06-14 LAB
ALBUMIN SERPL-MCNC: 3.5 G/DL (ref 3.5–5.2)
ALBUMIN/GLOB SERPL: 1 G/DL
ALP SERPL-CCNC: 93 U/L (ref 39–117)
ALT SERPL W P-5'-P-CCNC: 24 U/L (ref 1–41)
ANION GAP SERPL CALCULATED.3IONS-SCNC: 10 MMOL/L
AST SERPL-CCNC: 18 U/L (ref 1–40)
BASOPHILS # BLD AUTO: 0.02 10*3/MM3 (ref 0–0.2)
BASOPHILS NFR BLD AUTO: 0.2 % (ref 0–1.5)
BILIRUB SERPL-MCNC: 0.4 MG/DL (ref 0.2–1.2)
BILIRUB UR QL STRIP: NEGATIVE
BUN BLD-MCNC: 10 MG/DL (ref 6–20)
BUN/CREAT SERPL: 14.5 (ref 7–25)
CALCIUM SPEC-SCNC: 8.9 MG/DL (ref 8.6–10.5)
CHLORIDE SERPL-SCNC: 97 MMOL/L (ref 98–107)
CLARITY UR: CLEAR
CO2 SERPL-SCNC: 25 MMOL/L (ref 22–29)
COLOR UR: YELLOW
CREAT BLD-MCNC: 0.69 MG/DL (ref 0.76–1.27)
DEPRECATED RDW RBC AUTO: 39.8 FL (ref 37–54)
EOSINOPHIL # BLD AUTO: 0.33 10*3/MM3 (ref 0–0.4)
EOSINOPHIL NFR BLD AUTO: 4 % (ref 0.3–6.2)
ERYTHROCYTE [DISTWIDTH] IN BLOOD BY AUTOMATED COUNT: 13.7 % (ref 12.3–15.4)
GFR SERPL CREATININE-BSD FRML MDRD: 127 ML/MIN/1.73
GLOBULIN UR ELPH-MCNC: 3.5 GM/DL
GLUCOSE BLD-MCNC: 459 MG/DL (ref 65–99)
GLUCOSE BLDC GLUCOMTR-MCNC: 272 MG/DL (ref 70–130)
GLUCOSE BLDC GLUCOMTR-MCNC: 298 MG/DL (ref 70–130)
GLUCOSE UR STRIP-MCNC: ABNORMAL MG/DL
HCT VFR BLD AUTO: 40.7 % (ref 37.5–51)
HGB BLD-MCNC: 13.9 G/DL (ref 13–17.7)
HGB UR QL STRIP.AUTO: NEGATIVE
HOLD SPECIMEN: NORMAL
HOLD SPECIMEN: NORMAL
IMM GRANULOCYTES # BLD AUTO: 0.1 10*3/MM3 (ref 0–0.05)
IMM GRANULOCYTES NFR BLD AUTO: 1.2 % (ref 0–0.5)
KETONES UR QL STRIP: NEGATIVE
LEUKOCYTE ESTERASE UR QL STRIP.AUTO: NEGATIVE
LYMPHOCYTES # BLD AUTO: 1.4 10*3/MM3 (ref 0.7–3.1)
LYMPHOCYTES NFR BLD AUTO: 16.9 % (ref 19.6–45.3)
MCH RBC QN AUTO: 27.6 PG (ref 26.6–33)
MCHC RBC AUTO-ENTMCNC: 34.2 G/DL (ref 31.5–35.7)
MCV RBC AUTO: 80.9 FL (ref 79–97)
MONOCYTES # BLD AUTO: 0.61 10*3/MM3 (ref 0.1–0.9)
MONOCYTES NFR BLD AUTO: 7.4 % (ref 5–12)
NEUTROPHILS # BLD AUTO: 5.83 10*3/MM3 (ref 1.7–7)
NEUTROPHILS NFR BLD AUTO: 70.3 % (ref 42.7–76)
NITRITE UR QL STRIP: NEGATIVE
NRBC BLD AUTO-RTO: 0 /100 WBC (ref 0–0.2)
NT-PROBNP SERPL-MCNC: 16.7 PG/ML (ref 5–450)
PH UR STRIP.AUTO: 6 [PH] (ref 5–9)
PLATELET # BLD AUTO: 190 10*3/MM3 (ref 140–450)
PMV BLD AUTO: 9.2 FL (ref 6–12)
POTASSIUM BLD-SCNC: 4.3 MMOL/L (ref 3.5–5.2)
PROT SERPL-MCNC: 7 G/DL (ref 6–8.5)
PROT UR QL STRIP: ABNORMAL
RBC # BLD AUTO: 5.03 10*6/MM3 (ref 4.14–5.8)
SODIUM BLD-SCNC: 132 MMOL/L (ref 136–145)
SP GR UR STRIP: 1.03 (ref 1–1.03)
TROPONIN T SERPL-MCNC: <0.01 NG/ML (ref 0–0.03)
TROPONIN T SERPL-MCNC: <0.01 NG/ML (ref 0–0.03)
UROBILINOGEN UR QL STRIP: ABNORMAL
WBC NRBC COR # BLD: 8.29 10*3/MM3 (ref 3.4–10.8)
WHOLE BLOOD HOLD SPECIMEN: NORMAL
WHOLE BLOOD HOLD SPECIMEN: NORMAL

## 2019-06-14 PROCEDURE — G0378 HOSPITAL OBSERVATION PER HR: HCPCS

## 2019-06-14 PROCEDURE — 96375 TX/PRO/DX INJ NEW DRUG ADDON: CPT

## 2019-06-14 PROCEDURE — 99285 EMERGENCY DEPT VISIT HI MDM: CPT

## 2019-06-14 PROCEDURE — 63710000001 INSULIN REGULAR HUMAN PER 5 UNITS: Performed by: EMERGENCY MEDICINE

## 2019-06-14 PROCEDURE — 25010000002 MORPHINE PER 10 MG: Performed by: EMERGENCY MEDICINE

## 2019-06-14 PROCEDURE — 25010000002 ONDANSETRON PER 1 MG: Performed by: EMERGENCY MEDICINE

## 2019-06-14 PROCEDURE — 84484 ASSAY OF TROPONIN QUANT: CPT | Performed by: EMERGENCY MEDICINE

## 2019-06-14 PROCEDURE — 96374 THER/PROPH/DIAG INJ IV PUSH: CPT

## 2019-06-14 PROCEDURE — 25010000002 HYDROMORPHONE 1 MG/ML SOLUTION: Performed by: HOSPITALIST

## 2019-06-14 PROCEDURE — 94640 AIRWAY INHALATION TREATMENT: CPT

## 2019-06-14 PROCEDURE — 63710000001 INSULIN DETEMIR PER 5 UNITS: Performed by: NURSE PRACTITIONER

## 2019-06-14 PROCEDURE — 80053 COMPREHEN METABOLIC PANEL: CPT | Performed by: EMERGENCY MEDICINE

## 2019-06-14 PROCEDURE — 82962 GLUCOSE BLOOD TEST: CPT

## 2019-06-14 PROCEDURE — 93010 ELECTROCARDIOGRAM REPORT: CPT | Performed by: INTERNAL MEDICINE

## 2019-06-14 PROCEDURE — 83880 ASSAY OF NATRIURETIC PEPTIDE: CPT | Performed by: EMERGENCY MEDICINE

## 2019-06-14 PROCEDURE — 96376 TX/PRO/DX INJ SAME DRUG ADON: CPT

## 2019-06-14 PROCEDURE — 71046 X-RAY EXAM CHEST 2 VIEWS: CPT

## 2019-06-14 PROCEDURE — 84484 ASSAY OF TROPONIN QUANT: CPT | Performed by: NURSE PRACTITIONER

## 2019-06-14 PROCEDURE — 93005 ELECTROCARDIOGRAM TRACING: CPT | Performed by: EMERGENCY MEDICINE

## 2019-06-14 PROCEDURE — 94760 N-INVAS EAR/PLS OXIMETRY 1: CPT

## 2019-06-14 PROCEDURE — 63710000001 INSULIN ASPART PER 5 UNITS: Performed by: NURSE PRACTITIONER

## 2019-06-14 PROCEDURE — 81003 URINALYSIS AUTO W/O SCOPE: CPT | Performed by: EMERGENCY MEDICINE

## 2019-06-14 PROCEDURE — 84484 ASSAY OF TROPONIN QUANT: CPT | Performed by: HOSPITALIST

## 2019-06-14 PROCEDURE — 85025 COMPLETE CBC W/AUTO DIFF WBC: CPT | Performed by: EMERGENCY MEDICINE

## 2019-06-14 PROCEDURE — 93005 ELECTROCARDIOGRAM TRACING: CPT | Performed by: FAMILY MEDICINE

## 2019-06-14 PROCEDURE — 94799 UNLISTED PULMONARY SVC/PX: CPT

## 2019-06-14 RX ORDER — SODIUM CHLORIDE 0.9 % (FLUSH) 0.9 %
3-10 SYRINGE (ML) INJECTION AS NEEDED
Status: DISCONTINUED | OUTPATIENT
Start: 2019-06-14 | End: 2019-06-15 | Stop reason: HOSPADM

## 2019-06-14 RX ORDER — IPRATROPIUM BROMIDE AND ALBUTEROL SULFATE 2.5; .5 MG/3ML; MG/3ML
3 SOLUTION RESPIRATORY (INHALATION) ONCE
Status: COMPLETED | OUTPATIENT
Start: 2019-06-14 | End: 2019-06-14

## 2019-06-14 RX ORDER — SODIUM CHLORIDE 0.9 % (FLUSH) 0.9 %
3 SYRINGE (ML) INJECTION EVERY 12 HOURS SCHEDULED
Status: DISCONTINUED | OUTPATIENT
Start: 2019-06-14 | End: 2019-06-15 | Stop reason: HOSPADM

## 2019-06-14 RX ORDER — NEBIVOLOL 5 MG/1
5 TABLET ORAL 2 TIMES DAILY
Status: DISCONTINUED | OUTPATIENT
Start: 2019-06-14 | End: 2019-06-15 | Stop reason: HOSPADM

## 2019-06-14 RX ORDER — ASPIRIN 81 MG/1
81 TABLET ORAL DAILY
Status: DISCONTINUED | OUTPATIENT
Start: 2019-06-15 | End: 2019-06-15 | Stop reason: HOSPADM

## 2019-06-14 RX ORDER — SERTRALINE HYDROCHLORIDE 25 MG/1
25 TABLET, FILM COATED ORAL NIGHTLY
Status: DISCONTINUED | OUTPATIENT
Start: 2019-06-14 | End: 2019-06-15 | Stop reason: HOSPADM

## 2019-06-14 RX ORDER — BUDESONIDE AND FORMOTEROL FUMARATE DIHYDRATE 160; 4.5 UG/1; UG/1
2 AEROSOL RESPIRATORY (INHALATION)
Status: DISCONTINUED | OUTPATIENT
Start: 2019-06-14 | End: 2019-06-15 | Stop reason: HOSPADM

## 2019-06-14 RX ORDER — RANOLAZINE 500 MG/1
1000 TABLET, EXTENDED RELEASE ORAL EVERY 12 HOURS SCHEDULED
Status: DISCONTINUED | OUTPATIENT
Start: 2019-06-14 | End: 2019-06-15 | Stop reason: HOSPADM

## 2019-06-14 RX ORDER — ONDANSETRON 4 MG/1
4 TABLET, FILM COATED ORAL EVERY 6 HOURS PRN
Status: DISCONTINUED | OUTPATIENT
Start: 2019-06-14 | End: 2019-06-15 | Stop reason: HOSPADM

## 2019-06-14 RX ORDER — ACETAMINOPHEN 325 MG/1
650 TABLET ORAL EVERY 4 HOURS PRN
Status: DISCONTINUED | OUTPATIENT
Start: 2019-06-14 | End: 2019-06-15 | Stop reason: HOSPADM

## 2019-06-14 RX ORDER — ASPIRIN 325 MG
325 TABLET ORAL ONCE
Status: COMPLETED | OUTPATIENT
Start: 2019-06-14 | End: 2019-06-14

## 2019-06-14 RX ORDER — LISINOPRIL 40 MG/1
40 TABLET ORAL EVERY MORNING
Status: DISCONTINUED | OUTPATIENT
Start: 2019-06-15 | End: 2019-06-15 | Stop reason: HOSPADM

## 2019-06-14 RX ORDER — SODIUM CHLORIDE 0.9 % (FLUSH) 0.9 %
10 SYRINGE (ML) INJECTION AS NEEDED
Status: DISCONTINUED | OUTPATIENT
Start: 2019-06-14 | End: 2019-06-15 | Stop reason: HOSPADM

## 2019-06-14 RX ORDER — DEXTROSE MONOHYDRATE 25 G/50ML
25 INJECTION, SOLUTION INTRAVENOUS
Status: DISCONTINUED | OUTPATIENT
Start: 2019-06-14 | End: 2019-06-15 | Stop reason: HOSPADM

## 2019-06-14 RX ORDER — ISOSORBIDE MONONITRATE 60 MG/1
120 TABLET, EXTENDED RELEASE ORAL DAILY
Status: DISCONTINUED | OUTPATIENT
Start: 2019-06-14 | End: 2019-06-15 | Stop reason: HOSPADM

## 2019-06-14 RX ORDER — ONDANSETRON 2 MG/ML
4 INJECTION INTRAMUSCULAR; INTRAVENOUS EVERY 6 HOURS PRN
Status: DISCONTINUED | OUTPATIENT
Start: 2019-06-14 | End: 2019-06-15 | Stop reason: HOSPADM

## 2019-06-14 RX ORDER — PANTOPRAZOLE SODIUM 40 MG/1
40 TABLET, DELAYED RELEASE ORAL NIGHTLY
Status: DISCONTINUED | OUTPATIENT
Start: 2019-06-14 | End: 2019-06-15 | Stop reason: HOSPADM

## 2019-06-14 RX ORDER — AMLODIPINE BESYLATE 10 MG/1
10 TABLET ORAL NIGHTLY
Status: DISCONTINUED | OUTPATIENT
Start: 2019-06-14 | End: 2019-06-15 | Stop reason: HOSPADM

## 2019-06-14 RX ORDER — PRAMIPEXOLE DIHYDROCHLORIDE 1 MG/1
2 TABLET ORAL NIGHTLY
Status: DISCONTINUED | OUTPATIENT
Start: 2019-06-14 | End: 2019-06-15 | Stop reason: HOSPADM

## 2019-06-14 RX ORDER — ONDANSETRON 2 MG/ML
4 INJECTION INTRAMUSCULAR; INTRAVENOUS ONCE
Status: COMPLETED | OUTPATIENT
Start: 2019-06-14 | End: 2019-06-14

## 2019-06-14 RX ORDER — HYDRALAZINE HYDROCHLORIDE 20 MG/ML
10 INJECTION INTRAMUSCULAR; INTRAVENOUS EVERY 6 HOURS PRN
Status: DISCONTINUED | OUTPATIENT
Start: 2019-06-14 | End: 2019-06-15 | Stop reason: HOSPADM

## 2019-06-14 RX ORDER — NICOTINE POLACRILEX 4 MG
15 LOZENGE BUCCAL
Status: DISCONTINUED | OUTPATIENT
Start: 2019-06-14 | End: 2019-06-15 | Stop reason: HOSPADM

## 2019-06-14 RX ORDER — TRAZODONE HYDROCHLORIDE 150 MG/1
300 TABLET ORAL NIGHTLY
Status: DISCONTINUED | OUTPATIENT
Start: 2019-06-14 | End: 2019-06-15 | Stop reason: HOSPADM

## 2019-06-14 RX ORDER — ATORVASTATIN CALCIUM 40 MG/1
80 TABLET, FILM COATED ORAL DAILY
Status: DISCONTINUED | OUTPATIENT
Start: 2019-06-14 | End: 2019-06-15 | Stop reason: HOSPADM

## 2019-06-14 RX ORDER — ALBUTEROL SULFATE 2.5 MG/3ML
1.25 SOLUTION RESPIRATORY (INHALATION) EVERY 6 HOURS PRN
Status: DISCONTINUED | OUTPATIENT
Start: 2019-06-14 | End: 2019-06-15 | Stop reason: HOSPADM

## 2019-06-14 RX ADMIN — ATORVASTATIN CALCIUM 80 MG: 40 TABLET, FILM COATED ORAL at 21:01

## 2019-06-14 RX ADMIN — BUDESONIDE AND FORMOTEROL FUMARATE DIHYDRATE 2 PUFF: 160; 4.5 AEROSOL RESPIRATORY (INHALATION) at 19:50

## 2019-06-14 RX ADMIN — PRAMIPEXOLE DIHYDROCHLORIDE 2 MG: 1 TABLET ORAL at 21:00

## 2019-06-14 RX ADMIN — ISOSORBIDE MONONITRATE 120 MG: 60 TABLET, EXTENDED RELEASE ORAL at 21:03

## 2019-06-14 RX ADMIN — INSULIN ASPART 12 UNITS: 100 INJECTION, SOLUTION INTRAVENOUS; SUBCUTANEOUS at 21:05

## 2019-06-14 RX ADMIN — AMLODIPINE BESYLATE 10 MG: 10 TABLET ORAL at 21:04

## 2019-06-14 RX ADMIN — PANTOPRAZOLE SODIUM 40 MG: 40 TABLET, DELAYED RELEASE ORAL at 21:02

## 2019-06-14 RX ADMIN — MORPHINE SULFATE 4 MG: 4 INJECTION INTRAVENOUS at 17:15

## 2019-06-14 RX ADMIN — MORPHINE SULFATE 4 MG: 4 INJECTION, SOLUTION INTRAMUSCULAR; INTRAVENOUS at 15:07

## 2019-06-14 RX ADMIN — HUMAN INSULIN 10 UNITS: 100 INJECTION, SOLUTION SUBCUTANEOUS at 16:10

## 2019-06-14 RX ADMIN — NITROGLYCERIN 1 INCH: 20 OINTMENT TOPICAL at 15:48

## 2019-06-14 RX ADMIN — ASPIRIN 325 MG: 325 TABLET, COATED ORAL at 14:46

## 2019-06-14 RX ADMIN — RANOLAZINE 1000 MG: 500 TABLET, FILM COATED, EXTENDED RELEASE ORAL at 21:01

## 2019-06-14 RX ADMIN — HYDROMORPHONE HYDROCHLORIDE 0.5 MG: 1 INJECTION, SOLUTION INTRAMUSCULAR; INTRAVENOUS; SUBCUTANEOUS at 21:40

## 2019-06-14 RX ADMIN — INSULIN DETEMIR 75 UNITS: 100 INJECTION, SOLUTION SUBCUTANEOUS at 21:34

## 2019-06-14 RX ADMIN — ONDANSETRON 4 MG: 2 INJECTION INTRAMUSCULAR; INTRAVENOUS at 15:07

## 2019-06-14 RX ADMIN — TRAZODONE HYDROCHLORIDE 300 MG: 150 TABLET ORAL at 21:03

## 2019-06-14 RX ADMIN — NEBIVOLOL HYDROCHLORIDE 5 MG: 5 TABLET ORAL at 21:00

## 2019-06-14 RX ADMIN — RIVAROXABAN 20 MG: 10 TABLET, FILM COATED ORAL at 21:02

## 2019-06-14 RX ADMIN — SODIUM CHLORIDE, PRESERVATIVE FREE 3 ML: 5 INJECTION INTRAVENOUS at 21:11

## 2019-06-14 RX ADMIN — Medication 10 ML: at 15:08

## 2019-06-14 RX ADMIN — IPRATROPIUM BROMIDE AND ALBUTEROL SULFATE 3 ML: 2.5; .5 SOLUTION RESPIRATORY (INHALATION) at 14:57

## 2019-06-14 RX ADMIN — SERTRALINE HYDROCHLORIDE 25 MG: 25 TABLET ORAL at 21:04

## 2019-06-14 NOTE — ED NOTES
[facility] [place] [facility]     Josi Hassan, RN  06/14/19 2920

## 2019-06-14 NOTE — ED PROVIDER NOTES
Subjective   38 years old morbidly obese male with history of coronary artery disease status post stenting, pulmonary embolism on Xarelto, diabetes mellitus, hypertension, hyperlipidemia presented with gradually worsening chest pain for 2 hours.  Has taken 3 nitros with no significant relief.  Associated with some shortness of breath which is not any worse than usual.  No cough fever or chills reported.        History provided by:  Patient  Chest Pain   Pain location:  L chest and substernal area  Pain quality: sharp    Pain radiates to:  Does not radiate  Pain severity:  Moderate  Onset quality:  Gradual  Duration:  2 hours  Timing:  Constant  Progression:  Worsening  Chronicity:  New  Relieved by:  Nothing  Worsened by:  Nothing  Ineffective treatments:  Nitroglycerin  Associated symptoms: no abdominal pain, no anxiety, no claudication, no cough, no fatigue, no nausea, no palpitations and no shortness of breath    Risk factors: coronary artery disease, diabetes mellitus, high cholesterol, hypertension and male sex        Review of Systems   Constitutional: Negative for chills and fatigue.   HENT: Negative for congestion, rhinorrhea, sinus pressure and sore throat.    Respiratory: Positive for chest tightness. Negative for cough and shortness of breath.    Cardiovascular: Positive for chest pain. Negative for palpitations and claudication.   Gastrointestinal: Negative for abdominal pain and nausea.   Genitourinary: Negative for flank pain.   Skin: Negative for color change.   Psychiatric/Behavioral: Negative for agitation.       Past Medical History:   Diagnosis Date   • Asthma    • Chest pain    • Chronic back pain    • Coronary artery disease    • Diabetes mellitus (CMS/HCC)     10.6% was last A1C per patient   • Factor II deficiency (CMS/HCC)    • Fatty liver    • GERD (gastroesophageal reflux disease)    • Hyperlipidemia    • Hypertension    • Morbid obesity (CMS/HCC)    • Pulmonary embolism (CMS/HCC)    • RLS  (restless legs syndrome)    • Seizures (CMS/HCC)     last one many years ago   • Sleep apnea     c-pap       Allergies   Allergen Reactions   • Glipizide Other (See Comments) and Unknown (See Comments)     Slurred Speech  Slurred Speech  Hallucinations, Slurred Speech   • Reglan [Metoclopramide] Anxiety   • Tramadol Other (See Comments)     seizures   • Risperidone Other (See Comments)     Slurred speech  Can't stand, trouble breathing, slurred speech         Past Surgical History:   Procedure Laterality Date   • ADENOIDECTOMY     • CARDIAC CATHETERIZATION N/A 3/15/2017    Procedure: Left Heart Cath;  Surgeon: Edwige Briceno MD;  Location: [facility] CATH INVASIVE LOCATION;  Service:    • CARDIAC CATHETERIZATION N/A 3/15/2017    Procedure: Stent SOPHIA coronary;  Surgeon: Edwige Briceno MD;  Location: [facility] CATH INVASIVE LOCATION;  Service:    • CARDIAC CATHETERIZATION N/A 3/1/2018    Procedure: Left Heart Cath;  Surgeon: Conor Parsons MD;  Location: [facility] CATH INVASIVE LOCATION;  Service:    • CHOLECYSTECTOMY      lap   • CORONARY ANGIOPLASTY WITH STENT PLACEMENT     • TN RT/LT HEART CATHETERS N/A 3/15/2017    Procedure: Percutaneous Coronary Intervention;  Surgeon: Edwige Briceno MD;  Location: [facility] CATH INVASIVE LOCATION;  Service: Cardiovascular   • TONSILLECTOMY     • TRANSESOPHAGEAL ECHOCARDIOGRAM (MONICA)         Family History   Problem Relation Age of Onset   • Heart disease Mother    • Hypertension Mother    • Hyperlipidemia Mother    • Coronary artery disease Mother    • Diabetes Mother    • Obesity Mother    • Kidney failure Mother    • Sleep apnea Father    • Cancer Paternal Grandfather        Social History     Socioeconomic History   • Marital status:      Spouse name: Cori   • Number of children: 0   • Years of education: Not on file   • Highest education level: Not on file   Occupational History   • Occupation: Disabled   Tobacco Use   • Smoking status: Former Smoker     Packs/day: 0.25      Years: 0.50     Pack years: 0.12     Types: Cigarettes     Last attempt to quit:      Years since quittin.4   • Smokeless tobacco: Current User     Types: Snuff   • Tobacco comment: quit smoking 25 years ago; he does use snuff   Substance and Sexual Activity   • Alcohol use: No   • Drug use: No   • Sexual activity: Defer           Objective   Physical Exam   Constitutional: He is oriented to person, place, and time. He appears well-developed and well-nourished.   HENT:   Head: Normocephalic and atraumatic.   Eyes: EOM are normal.   Neck: Normal range of motion. Neck supple.   Cardiovascular: Normal rate and regular rhythm.   Pulmonary/Chest: Effort normal and breath sounds normal. He has no decreased breath sounds. He has no wheezes. He has no rales.   Abdominal: Soft. There is no tenderness.   Musculoskeletal: Normal range of motion.   Neurological: He is alert and oriented to person, place, and time.   Skin: Skin is warm. Capillary refill takes less than 2 seconds.   Psychiatric: His mood appears anxious.   Nursing note and vitals reviewed.      ECG 12 Lead    Date/Time: 2019 2:55 PM  Performed by: Simon Roldan MD  Authorized by: Simon Roldan MD   Interpreted by physician  Rhythm: sinus rhythm  Rate: normal  BPM: 83  QRS axis: normal  T Waves: T waves normal  Clinical impression: abnormal ECG  Comments: Nonspecific ST changes                 ED Course                  MDM  Number of Diagnoses or Management Options  Chest pain, rule out acute myocardial infarction:   Hyperglycemia:   Diagnosis management comments: 38 years old is evaluated for chest pain.  He is given aspirin along with nitro and morphine x2 but still have chest tightness/pain.  Negative initial troponins.  No ST elevation on EKG.  He is currently on Xarelto and I have not done any PE work-up as he does not seems to be short of breath but what he has at baseline.  I have seen patient multiple times in the past as well and this is  quite his baseline shortness of breath.  I have discussed with Dr. Samaniego, would bring patient in for rule out.  I have given 10 units of insulin and blood sugar is improving.  Discussed with Dr. Tinoco and patient is accepted.       Amount and/or Complexity of Data Reviewed  Clinical lab tests: ordered and reviewed  Tests in the radiology section of CPT®: ordered and reviewed  Discuss the patient with other providers: yes      Labs Reviewed   COMPREHENSIVE METABOLIC PANEL - Abnormal; Notable for the following components:       Result Value    Glucose 459 (*)     Creatinine 0.69 (*)     Sodium 132 (*)     Chloride 97 (*)     All other components within normal limits    Narrative:     GFR Normal >60  Chronic Kidney Disease <60  Kidney Failure <15   URINALYSIS W/ MICROSCOPIC IF INDICATED (NO CULTURE) - Abnormal; Notable for the following components:    Specific Gravity, UA 1.034 (*)     Glucose, UA >=1000 mg/dL (3+) (*)     Protein, UA Trace (*)     All other components within normal limits    Narrative:     Urine microscopic not indicated.   CBC WITH AUTO DIFFERENTIAL - Abnormal; Notable for the following components:    Lymphocyte % 16.9 (*)     Immature Grans % 1.2 (*)     Immature Grans, Absolute 0.10 (*)     All other components within normal limits   POCT GLUCOSE FINGERSTICK - Abnormal; Notable for the following components:    Glucose 298 (*)     All other components within normal limits   BNP (IN-HOUSE) - Normal    Narrative:     Among patients with dyspnea, NT-proBNP is highly sensitive for the detection of acute congestive heart failure. In addition NT-proBNP of <300 pg/ml effectively rules out acute congestive heart failure with 99% negative predictive value.   TROPONIN (IN-HOUSE) - Normal    Narrative:     Troponin T Reference Range:  <= 0.03 ng/mL-   Negative for AMI  >0.03 ng/mL-     Abnormal for myocardial necrosis.  Clinicians would have to utilize clinical acumen, EKG, Troponin and serial changes to  determine if it is an Acute Myocardial Infarction or myocardial injury due to an underlying chronic condition.    RAINBOW DRAW    Narrative:     The following orders were created for panel order [place] Draw.  Procedure                               Abnormality         Status                     ---------                               -----------         ------                     Light Blue Top[[id number]]                                   Final result               Green Top (Gel)[[id number]]                                  Final result               Lavender Top[[id number]]                                     Final result               Gold Top - SST[[id number]]                                   Final result                 Please view results for these tests on the individual orders.   TROPONIN (IN-HOUSE)   TROPONIN (IN-HOUSE)   POCT GLUCOSE FINGERSTICK   POCT GLUCOSE FINGERSTICK   POCT GLUCOSE FINGERSTICK   POCT GLUCOSE FINGERSTICK   POCT GLUCOSE FINGERSTICK   CBC AND DIFFERENTIAL    Narrative:     The following orders were created for panel order CBC & Differential.  Procedure                               Abnormality         Status                     ---------                               -----------         ------                     CBC Auto Differential[[id number]]        Abnormal            Final result                 Please view results for these tests on the individual orders.   LIGHT BLUE TOP   GREEN TOP   LAVENDER TOP   GOLD TOP - SST       Xr Chest 2 View    Result Date: 6/14/2019  Narrative: EXAM:          Radiograph(s), Chest VIEWS:    PA / Lat ; 2     DATE/TIME:  6/14/2019 3:31 PM CDT            INDICATION:   chest pain  COMPARISON:  CXR: 4/9/19         FINDINGS:         - lines/tubes:    none   - cardiac:         size within normal limits       - mediastinum: contour within normal limits       - lungs:         no focal air space process, pulmonary interstitial edema, nodule(s)/mass           -  pleura:         no evidence of  fluid                - osseous:         unremarkable for age                - misc.:        Impression: CONCLUSION:    1. No evidence of an active cardiopulmonary process.                                  Electronically signed by:  MIGUEL A Soni MD  6/14/2019 3:32 PM CDT Workstation: [id number]          Final diagnoses:   Chest pain, rule out acute myocardial infarction   Hyperglycemia            Simon Roldan MD  06/14/19 1917

## 2019-06-15 VITALS
TEMPERATURE: 96.8 F | BODY MASS INDEX: 44.1 KG/M2 | HEIGHT: 71 IN | RESPIRATION RATE: 18 BRPM | WEIGHT: 315 LBS | SYSTOLIC BLOOD PRESSURE: 109 MMHG | HEART RATE: 60 BPM | OXYGEN SATURATION: 94 % | DIASTOLIC BLOOD PRESSURE: 46 MMHG

## 2019-06-15 LAB
ANION GAP SERPL CALCULATED.3IONS-SCNC: 11 MMOL/L
BASOPHILS # BLD AUTO: 0.03 10*3/MM3 (ref 0–0.2)
BASOPHILS NFR BLD AUTO: 0.4 % (ref 0–1.5)
BUN BLD-MCNC: 14 MG/DL (ref 6–20)
BUN/CREAT SERPL: 20 (ref 7–25)
CALCIUM SPEC-SCNC: 9 MG/DL (ref 8.6–10.5)
CHLORIDE SERPL-SCNC: 98 MMOL/L (ref 98–107)
CO2 SERPL-SCNC: 25 MMOL/L (ref 22–29)
CREAT BLD-MCNC: 0.7 MG/DL (ref 0.76–1.27)
DEPRECATED RDW RBC AUTO: 41 FL (ref 37–54)
EOSINOPHIL # BLD AUTO: 0.33 10*3/MM3 (ref 0–0.4)
EOSINOPHIL NFR BLD AUTO: 4.3 % (ref 0.3–6.2)
ERYTHROCYTE [DISTWIDTH] IN BLOOD BY AUTOMATED COUNT: 13.9 % (ref 12.3–15.4)
GFR SERPL CREATININE-BSD FRML MDRD: 125 ML/MIN/1.73
GLUCOSE BLD-MCNC: 344 MG/DL (ref 65–99)
GLUCOSE BLDC GLUCOMTR-MCNC: 330 MG/DL (ref 70–130)
GLUCOSE BLDC GLUCOMTR-MCNC: 349 MG/DL (ref 70–130)
HCT VFR BLD AUTO: 39.4 % (ref 37.5–51)
HGB BLD-MCNC: 12.9 G/DL (ref 13–17.7)
IMM GRANULOCYTES # BLD AUTO: 0.13 10*3/MM3 (ref 0–0.05)
IMM GRANULOCYTES NFR BLD AUTO: 1.7 % (ref 0–0.5)
LYMPHOCYTES # BLD AUTO: 1.72 10*3/MM3 (ref 0.7–3.1)
LYMPHOCYTES NFR BLD AUTO: 22.3 % (ref 19.6–45.3)
MCH RBC QN AUTO: 27 PG (ref 26.6–33)
MCHC RBC AUTO-ENTMCNC: 32.7 G/DL (ref 31.5–35.7)
MCV RBC AUTO: 82.4 FL (ref 79–97)
MONOCYTES # BLD AUTO: 0.65 10*3/MM3 (ref 0.1–0.9)
MONOCYTES NFR BLD AUTO: 8.4 % (ref 5–12)
NEUTROPHILS # BLD AUTO: 4.87 10*3/MM3 (ref 1.7–7)
NEUTROPHILS NFR BLD AUTO: 62.9 % (ref 42.7–76)
NRBC BLD AUTO-RTO: 0 /100 WBC (ref 0–0.2)
PLATELET # BLD AUTO: 151 10*3/MM3 (ref 140–450)
PMV BLD AUTO: 9.3 FL (ref 6–12)
POTASSIUM BLD-SCNC: 4.2 MMOL/L (ref 3.5–5.2)
RBC # BLD AUTO: 4.78 10*6/MM3 (ref 4.14–5.8)
SODIUM BLD-SCNC: 134 MMOL/L (ref 136–145)
TROPONIN T SERPL-MCNC: <0.01 NG/ML (ref 0–0.03)
TROPONIN T SERPL-MCNC: <0.01 NG/ML (ref 0–0.03)
WBC NRBC COR # BLD: 7.73 10*3/MM3 (ref 3.4–10.8)
WHOLE BLOOD HOLD SPECIMEN: NORMAL

## 2019-06-15 PROCEDURE — 25010000002 ONDANSETRON PER 1 MG: Performed by: NURSE PRACTITIONER

## 2019-06-15 PROCEDURE — 94760 N-INVAS EAR/PLS OXIMETRY 1: CPT

## 2019-06-15 PROCEDURE — 94799 UNLISTED PULMONARY SVC/PX: CPT

## 2019-06-15 PROCEDURE — 82962 GLUCOSE BLOOD TEST: CPT

## 2019-06-15 PROCEDURE — 85025 COMPLETE CBC W/AUTO DIFF WBC: CPT | Performed by: NURSE PRACTITIONER

## 2019-06-15 PROCEDURE — 96376 TX/PRO/DX INJ SAME DRUG ADON: CPT

## 2019-06-15 PROCEDURE — 80048 BASIC METABOLIC PNL TOTAL CA: CPT | Performed by: NURSE PRACTITIONER

## 2019-06-15 PROCEDURE — G0378 HOSPITAL OBSERVATION PER HR: HCPCS

## 2019-06-15 PROCEDURE — 25010000002 HYDROMORPHONE 1 MG/ML SOLUTION: Performed by: HOSPITALIST

## 2019-06-15 PROCEDURE — 63710000001 INSULIN ASPART PER 5 UNITS: Performed by: NURSE PRACTITIONER

## 2019-06-15 PROCEDURE — 84484 ASSAY OF TROPONIN QUANT: CPT | Performed by: HOSPITALIST

## 2019-06-15 RX ORDER — HYDROCODONE BITARTRATE AND ACETAMINOPHEN 5; 325 MG/1; MG/1
1 TABLET ORAL EVERY 6 HOURS PRN
Qty: 10 TABLET | Refills: 0 | Status: SHIPPED | OUTPATIENT
Start: 2019-06-15 | End: 2020-02-08

## 2019-06-15 RX ADMIN — RANOLAZINE 1000 MG: 500 TABLET, FILM COATED, EXTENDED RELEASE ORAL at 08:06

## 2019-06-15 RX ADMIN — LISINOPRIL 40 MG: 40 TABLET ORAL at 06:14

## 2019-06-15 RX ADMIN — INSULIN ASPART 10 UNITS: 100 INJECTION, SOLUTION INTRAVENOUS; SUBCUTANEOUS at 12:25

## 2019-06-15 RX ADMIN — INSULIN ASPART 10 UNITS: 100 INJECTION, SOLUTION INTRAVENOUS; SUBCUTANEOUS at 08:11

## 2019-06-15 RX ADMIN — ISOSORBIDE MONONITRATE 120 MG: 60 TABLET, EXTENDED RELEASE ORAL at 08:06

## 2019-06-15 RX ADMIN — BUDESONIDE AND FORMOTEROL FUMARATE DIHYDRATE 2 PUFF: 160; 4.5 AEROSOL RESPIRATORY (INHALATION) at 10:45

## 2019-06-15 RX ADMIN — ATORVASTATIN CALCIUM 80 MG: 40 TABLET, FILM COATED ORAL at 08:06

## 2019-06-15 RX ADMIN — INSULIN ASPART 16 UNITS: 100 INJECTION, SOLUTION INTRAVENOUS; SUBCUTANEOUS at 12:25

## 2019-06-15 RX ADMIN — INSULIN ASPART 16 UNITS: 100 INJECTION, SOLUTION INTRAVENOUS; SUBCUTANEOUS at 08:10

## 2019-06-15 RX ADMIN — HYDROMORPHONE HYDROCHLORIDE 0.5 MG: 1 INJECTION, SOLUTION INTRAMUSCULAR; INTRAVENOUS; SUBCUTANEOUS at 12:26

## 2019-06-15 RX ADMIN — ASPIRIN 81 MG: 81 TABLET, COATED ORAL at 08:06

## 2019-06-15 RX ADMIN — ONDANSETRON 4 MG: 2 INJECTION INTRAMUSCULAR; INTRAVENOUS at 12:26

## 2019-06-15 RX ADMIN — HYDROMORPHONE HYDROCHLORIDE 0.5 MG: 1 INJECTION, SOLUTION INTRAMUSCULAR; INTRAVENOUS; SUBCUTANEOUS at 08:06

## 2019-06-15 RX ADMIN — NEBIVOLOL HYDROCHLORIDE 5 MG: 5 TABLET ORAL at 08:06

## 2019-06-15 NOTE — CONSULTS
Adult Nutrition  Assessment    Patient Name:  Yordy Hansen  YOB: 1980  MRN: [id number]  Admit Date:  6/14/2019    Assessment Date:  6/15/2019    Comments:  BMI 63 with pt at 272% IBW.  Pt declined Wt Loss Diet ed and info indicating he already had info.  Pt discharged.    Reason for Assessment     Row Name 06/15/19 1447          Reason for Assessment    Reason For Assessment  per organizational policy Wt Loss Diet ed     Diagnosis  other (see comments) dx Morbid Obesity     Identified At Risk by Screening Criteria  BMI;need for education                 Estimated/Assessed Needs     Row Name 06/15/19 1448          Calculation Measurements    Weight Used For Calculations  108 kg (238 lb 1.6 oz)        Estimated/Assessed Needs    Additional Documentation  Calorie Requirements (Group);Fluid Requirements (Group);Haywood-St. Jeor Equation (Group);Protein Requirements (Group)        Calorie Requirements    Estimated Calorie Need Method  Haywood-St Jeor        Haywood-St. Jeor Equation    RMR (Haywood-St. Jeor Equation)  2012.125        Protein Requirements    Est Protein Requirement Amount (gms/kg)  1.2 gm protein     Estimated Protein Requirements (gms/day)  129.6        Fluid Requirements    Estimated Fluid Requirements (mL/day)  3242     RDA Method (mL)  3242     [place]-Segar Method (over 20 kg)  3660           Evaluation of Received Nutrient/Fluid Intake     Row Name 06/15/19 1448          Calculation Measurements    Weight Used For Calculations  108 kg (238 lb 1.6 oz)         Evaluation of Prescribed Nutrient/Fluid Intake     Row Name 06/15/19 1448          Calculation Measurements    Weight Used For Calculations  108 kg (238 lb 1.6 oz)             Electronically signed by:  Ananya Oglesby RD  06/15/19 2:52 PM

## 2019-06-15 NOTE — PLAN OF CARE
Problem: Patient Care Overview  Goal: Plan of Care Review  Outcome: Ongoing (interventions implemented as appropriate)    Goal: Individualization and Mutuality  Outcome: Ongoing (interventions implemented as appropriate)    Goal: Discharge Needs Assessment  Outcome: Ongoing (interventions implemented as appropriate)    Goal: Interprofessional Rounds/Family Conf  Outcome: Ongoing (interventions implemented as appropriate)      Problem: Pain, Chronic (Adult)  Goal: Identify Related Risk Factors and Signs and Symptoms  Outcome: Ongoing (interventions implemented as appropriate)    Goal: Acceptable Pain/Comfort Level and Functional Ability  Outcome: Ongoing (interventions implemented as appropriate)      Problem: Cardiac: ACS (Acute Coronary Syndrome) (Adult)  Goal: Signs and Symptoms of Listed Potential Problems Will be Absent, Minimized or Managed (Cardiac: ACS)  Outcome: Ongoing (interventions implemented as appropriate)

## 2019-06-15 NOTE — DISCHARGE SUMMARY
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          Pertinent Test Results:   Xr Chest 2 View    Result Date: 6/14/2019  EXAM:          Radiograph(s), Chest VIEWS:    PA / Lat ; 2     DATE/TIME:  6/14/2019 3:31 PM CDT            INDICATION:   chest pain  COMPARISON:  CXR: 4/9/19         FINDINGS:         - lines/tubes:    none   - cardiac:         size within normal limits       - mediastinum: contour within normal limits       - lungs:         no focal air space process, pulmonary interstitial edema, nodule(s)/mass           - pleura:         no evidence of  fluid                - osseous:         unremarkable for age                - misc.:        CONCLUSION:    1. No evidence of an active cardiopulmonary process.                                  Electronically signed by:  MIGUEL A Soni MD  6/14/2019 3:32 PM CDT Workstation: [id number]      HPI/Hospital Course:  The patient is a 38 y.o. male with a history of PE on xarelto, factor II mutation, DMII, HTN, HLD, CAD s/p stent, morbid obesity with BMI of 63, chronic chest pain, and ADOLFO who presented to [facility] with  "chest pain. His chest pain began today, was moderate in intensity, retrosternal with radiation to the left shoulder, worsened over time, was present for 2 hours, and did not improve with nitroglycerin.  It was not worse with movement, deep breath, or tender to touch.  BP was elevated to 229/110 and glucose was elevated to 459. He has chronic recurrent chest pain and is evaluated frequently for this.  He was placed on observation.  Serial cardiac enzymes were negative.  EKG is without acute changes.  He was restarted on his home blood pressure medications and blood pressure improved.  He will continue on his current management.  He is now following with bariatric surgery.  He has been recommended lifestyle modifications. He will be prescribed a short course of norco and will continue with his anti-anginal medications. He is recommended to discuss with his primary care provider chronic pain management and probable pain clinic referral for chronic chest pain.  He is stable and discharged to home.    Condition on Discharge:  Stable    Physical Exam on Discharge:  /81 (BP Location: Left arm, Patient Position: Lying)   Pulse 62   Temp 97.5 °F (36.4 °C) (Temporal)   Resp 20   Ht 180.3 cm (71\")   Wt (!) 206 kg (453 lb 12.8 oz)   SpO2 94%   BMI 63.29 kg/m²   Physical Exam   Constitutional: He is oriented to person, place, and time. He appears well-developed and well-nourished. No distress.   HENT:   Head: Normocephalic.   Eyes: Conjunctivae are normal.   Cardiovascular: Normal rate, regular rhythm, normal heart sounds and intact distal pulses.   Pulmonary/Chest: Effort normal and breath sounds normal. No respiratory distress.   Abdominal: Soft. Bowel sounds are normal. He exhibits no distension. There is no tenderness.   Musculoskeletal: Normal range of motion. He exhibits no edema or deformity.   Neurological: He is alert and oriented to person, place, and time.   Skin: Skin is warm and dry. He is not " diaphoretic. No erythema.   Vitals reviewed.        Discharge Disposition:  Home or Self Care    Discharge Medications:     Discharge Medications      New Medications      Instructions Start Date   HYDROcodone-acetaminophen 5-325 MG per tablet  Commonly known as:  NORCO   1 tablet, Oral, Every 6 Hours PRN         Changes to Medications      Instructions Start Date   insulin aspart 100 UNIT/ML injection  Commonly known as:  novoLOG  What changed:  how much to take   25 Units, Subcutaneous, 3 Times Daily Before Meals      isosorbide mononitrate 120 MG 24 hr tablet  Commonly known as:  IMDUR  What changed:  when to take this   120 mg, Oral, Daily      lisinopril 40 MG tablet  Commonly known as:  PRINIVIL,ZESTRIL  What changed:  when to take this   40 mg, Oral, Every Morning      rivaroxaban 20 MG tablet  Commonly known as:  XARELTO  What changed:  when to take this   20 mg, Oral, Daily      sertraline 25 MG tablet  Commonly known as:  ZOLOFT  What changed:  when to take this   25 mg, Oral, Daily         Continue These Medications      Instructions Start Date   albuterol sulfate  (90 Base) MCG/ACT inhaler  Commonly known as:  PROVENTIL HFA;VENTOLIN HFA;PROAIR HFA   2 puffs, Inhalation, Every 4 Hours PRN      albuterol 1.25 MG/3ML nebulizer solution  Commonly known as:  ACCUNEB   1 ampule, Nebulization, Every 6 Hours PRN      amLODIPine 10 MG tablet  Commonly known as:  NORVASC   10 mg, Oral, Nightly      atorvastatin 80 MG tablet  Commonly known as:  LIPITOR   80 mg, Oral      budesonide-formoterol 160-4.5 MCG/ACT inhaler  Commonly known as:  SYMBICORT   2 puffs, Inhalation, 2 Times Daily - RT      BYSTOLIC 10 MG tablet  Generic drug:  nebivolol   5 mg, 2 Times Daily      fenofibrate 145 MG tablet  Commonly known as:  TRICOR   145 mg, Oral, Daily      glucose monitor monitoring kit   1 each, Does not apply, As Needed      MICROLET LANCETS misc   One touch delica lancets      nitroglycerin 0.4 MG SL  "tablet  Commonly known as:  NITROSTAT   0.4 mg, Sublingual, Every 5 Minutes PRN, Take no more than 3 doses in 15 minutes.      pantoprazole 40 MG EC tablet  Commonly known as:  PROTONIX   40 mg, Oral, Nightly      pramipexole 1 MG tablet  Commonly known as:  MIRAPEX   TAKE TWO TABLETS BY MOUTH EVERY NIGHT      ranolazine 1000 MG 12 hr tablet  Commonly known as:  RANEXA   1,000 mg, Oral, Every 12 Hours Scheduled      RELION INSULIN SYRINGE 31G X 15/64\" 0.5 ML misc  Generic drug:  Insulin Syringe-Needle U-100   No dose, route, or frequency recorded.      traZODone 300 MG tablet  Commonly known as:  DESYREL   TAKE ONE TABLET BY MOUTH EVERY NIGHT      TRESIBA FLEXTOUCH 100 UNIT/ML solution pen-injector injection  Generic drug:  insulin degludec   75 Units, Subcutaneous, Nightly             Discharge Diet:   Diet Instructions     Diet: Regular, Consistent Carbohydrate, Cardiac; Thin      Discharge Diet:   Regular  Consistent Carbohydrate  Cardiac       Fluid Consistency:  Thin          Activity at Discharge:   Activity Instructions     Activity as Tolerated          Follow-up Appointments:   1. PCP 1 week  Future Appointments   Date Time Provider Department Center   8/15/2019  2:15 PM Antoyn Jenkins MD PhD [facility] None     Artur Santana, APRN  06/15/19  11:23 AM                  "

## 2019-06-16 ENCOUNTER — READMISSION MANAGEMENT (OUTPATIENT)
Dept: CALL CENTER | Facility: HOSPITAL | Age: 39
End: 2019-06-16

## 2019-06-16 NOTE — OUTREACH NOTE
Prep Survey      Responses   Facility patient discharged from?  [place]   Is patient eligible?  Yes   Discharge diagnosis  CP,  HTN,  T2DM,  class 3 severe obesity   Does the patient have one of the following disease processes/diagnoses(primary or secondary)?  Other   Does the patient have Home health ordered?  No   Is there a DME ordered?  Yes   What DME was ordered?  bipap/cpap   Comments regarding appointments  see AVS   Prep survey completed?  Yes          Amalia Sales RN

## 2019-06-17 ENCOUNTER — EPISODE CHANGES (OUTPATIENT)
Dept: CASE MANAGEMENT | Facility: OTHER | Age: 39
End: 2019-06-17

## 2019-06-17 ENCOUNTER — READMISSION MANAGEMENT (OUTPATIENT)
Dept: CALL CENTER | Facility: HOSPITAL | Age: 39
End: 2019-06-17

## 2019-06-17 NOTE — OUTREACH NOTE
Medical Week 1 Survey      Responses   Facility patient discharged from?  [place]   Does the patient have one of the following disease processes/diagnoses(primary or secondary)?  Other   Is there a successful TCM telephone encounter documented?  No   Week 1 attempt successful?  No   Unsuccessful attempts  Attempt 1          Lou Jose RN

## 2019-06-20 ENCOUNTER — READMISSION MANAGEMENT (OUTPATIENT)
Dept: CALL CENTER | Facility: HOSPITAL | Age: 39
End: 2019-06-20

## 2019-06-20 NOTE — OUTREACH NOTE
Medical Week 1 Survey      Responses   Facility patient discharged from?  [place]   Does the patient have one of the following disease processes/diagnoses(primary or secondary)?  Other   Is there a successful TCM telephone encounter documented?  No   Week 1 attempt successful?  Yes   Call start time  1625   Call end time  1630   Discharge diagnosis  CP,  HTN,  T2DM,  class 3 severe obesity   Is patient permission given to speak with other caregiver?  Yes   Person spoke with today (if not patient) and relationship  Chandy/ wife   Meds reviewed with patient/caregiver?  Yes   Is the patient having any side effects they believe may be caused by any medication additions or changes?  No   Does the patient have all medications ordered at discharge?  Yes   Is the patient taking all medications as directed (includes completed medication regime)?  Yes   Does the patient have a primary care provider?   Yes   Does the patient have an appointment with their PCP within 7 days of discharge?  No   Comments regarding PCP  LALA Gonzalez/ has not had time to schedule yet.    What is preventing the patient from scheduling follow up appointments within 7 days of discharge?  Haven't had time   Nursing Interventions  Verified appointment date/time/provider, Educated patient on importance of making appointment, Advised patient to make appointment   Has the patient kept scheduled appointments due by today?  Yes   Has home health visited the patient within 72 hours of discharge?  N/A   What DME was ordered?  bipap/cpap   Psychosocial issues?  No   Comments  Patient's wife reports she is doing well. No chest pain.    Did the patient receive a copy of their discharge instructions?  Yes   Nursing interventions  Reviewed instructions with patient   What is the patient's perception of their health status since discharge?  Improving   Is the patient/caregiver able to teach back signs and symptoms related to disease process for when to  call PCP?  Yes   Is the patient/caregiver able to teach back signs and symptoms related to disease process for when to call 911?  Yes   Is the patient/caregiver able to teach back the hierarchy of who to call/visit for symptoms/problems? PCP, Specialist, Home health nurse, Urgent Care, ED, 911  Yes   Week 1 call completed?  Yes          Chapincito Snider RN

## 2019-06-27 ENCOUNTER — READMISSION MANAGEMENT (OUTPATIENT)
Dept: CALL CENTER | Facility: HOSPITAL | Age: 39
End: 2019-06-27

## 2019-06-27 NOTE — OUTREACH NOTE
Medical Week 2 Survey      Responses   Facility patient discharged from?  [place]   Does the patient have one of the following disease processes/diagnoses(primary or secondary)?  Other   Week 2 attempt successful?  No   Unsuccessful attempts  Attempt 1          Victor Hugo Dc RN

## 2019-06-28 ENCOUNTER — APPOINTMENT (OUTPATIENT)
Dept: GENERAL RADIOLOGY | Facility: HOSPITAL | Age: 39
End: 2019-06-28

## 2019-06-28 ENCOUNTER — HOSPITAL ENCOUNTER (EMERGENCY)
Facility: HOSPITAL | Age: 39
Discharge: HOME | End: 2019-06-28
Attending: EMERGENCY MEDICINE | Admitting: EMERGENCY MEDICINE

## 2019-06-28 ENCOUNTER — READMISSION MANAGEMENT (OUTPATIENT)
Dept: CALL CENTER | Facility: HOSPITAL | Age: 39
End: 2019-06-28

## 2019-06-28 ENCOUNTER — APPOINTMENT (OUTPATIENT)
Dept: CT IMAGING | Facility: HOSPITAL | Age: 39
End: 2019-06-28

## 2019-06-28 VITALS
RESPIRATION RATE: 20 BRPM | HEIGHT: 71 IN | BODY MASS INDEX: 44.1 KG/M2 | TEMPERATURE: 98 F | WEIGHT: 315 LBS | DIASTOLIC BLOOD PRESSURE: 79 MMHG | HEART RATE: 72 BPM | SYSTOLIC BLOOD PRESSURE: 168 MMHG | OXYGEN SATURATION: 96 %

## 2019-06-28 DIAGNOSIS — R10.12 LUQ ABDOMINAL PAIN: Primary | ICD-10-CM

## 2019-06-28 DIAGNOSIS — R19.7 DIARRHEA, UNSPECIFIED TYPE: ICD-10-CM

## 2019-06-28 DIAGNOSIS — E11.65 TYPE 2 DIABETES MELLITUS WITH HYPERGLYCEMIA, UNSPECIFIED WHETHER LONG TERM INSULIN USE (HCC): ICD-10-CM

## 2019-06-28 LAB
ALBUMIN SERPL-MCNC: 3.5 G/DL (ref 3.5–5.2)
ALBUMIN/GLOB SERPL: 0.9 G/DL
ALP SERPL-CCNC: 86 U/L (ref 39–117)
ALT SERPL W P-5'-P-CCNC: 22 U/L (ref 1–41)
ANION GAP SERPL CALCULATED.3IONS-SCNC: 14 MMOL/L (ref 5–15)
AST SERPL-CCNC: 24 U/L (ref 1–40)
BACTERIA UR QL AUTO: ABNORMAL /HPF
BASOPHILS # BLD AUTO: 0.05 10*3/MM3 (ref 0–0.2)
BASOPHILS NFR BLD AUTO: 0.7 % (ref 0–1.5)
BILIRUB SERPL-MCNC: 0.3 MG/DL (ref 0.2–1.2)
BILIRUB UR QL STRIP: NEGATIVE
BUN BLD-MCNC: 8 MG/DL (ref 6–20)
BUN/CREAT SERPL: 11.4 (ref 7–25)
CALCIUM SPEC-SCNC: 9.4 MG/DL (ref 8.6–10.5)
CHLORIDE SERPL-SCNC: 97 MMOL/L (ref 98–107)
CLARITY UR: CLEAR
CO2 SERPL-SCNC: 24 MMOL/L (ref 22–29)
COLOR UR: YELLOW
CREAT BLD-MCNC: 0.7 MG/DL (ref 0.76–1.27)
DEPRECATED RDW RBC AUTO: 39.5 FL (ref 37–54)
EOSINOPHIL # BLD AUTO: 0.34 10*3/MM3 (ref 0–0.4)
EOSINOPHIL NFR BLD AUTO: 4.5 % (ref 0.3–6.2)
ERYTHROCYTE [DISTWIDTH] IN BLOOD BY AUTOMATED COUNT: 13.6 % (ref 12.3–15.4)
GFR SERPL CREATININE-BSD FRML MDRD: 125 ML/MIN/1.73
GLOBULIN UR ELPH-MCNC: 3.9 GM/DL
GLUCOSE BLD-MCNC: 328 MG/DL (ref 65–99)
GLUCOSE BLDC GLUCOMTR-MCNC: 213 MG/DL (ref 70–130)
GLUCOSE UR STRIP-MCNC: ABNORMAL MG/DL
HCT VFR BLD AUTO: 41.6 % (ref 37.5–51)
HGB BLD-MCNC: 14 G/DL (ref 13–17.7)
HGB UR QL STRIP.AUTO: NEGATIVE
HOLD SPECIMEN: NORMAL
HOLD SPECIMEN: NORMAL
HYALINE CASTS UR QL AUTO: ABNORMAL /LPF
IMM GRANULOCYTES # BLD AUTO: 0.12 10*3/MM3 (ref 0–0.05)
IMM GRANULOCYTES NFR BLD AUTO: 1.6 % (ref 0–0.5)
KETONES UR QL STRIP: NEGATIVE
LEUKOCYTE ESTERASE UR QL STRIP.AUTO: NEGATIVE
LIPASE SERPL-CCNC: 34 U/L (ref 13–60)
LYMPHOCYTES # BLD AUTO: 1.82 10*3/MM3 (ref 0.7–3.1)
LYMPHOCYTES NFR BLD AUTO: 24.3 % (ref 19.6–45.3)
MCH RBC QN AUTO: 27 PG (ref 26.6–33)
MCHC RBC AUTO-ENTMCNC: 33.7 G/DL (ref 31.5–35.7)
MCV RBC AUTO: 80.2 FL (ref 79–97)
MONOCYTES # BLD AUTO: 0.71 10*3/MM3 (ref 0.1–0.9)
MONOCYTES NFR BLD AUTO: 9.5 % (ref 5–12)
NEUTROPHILS # BLD AUTO: 4.46 10*3/MM3 (ref 1.7–7)
NEUTROPHILS NFR BLD AUTO: 59.4 % (ref 42.7–76)
NITRITE UR QL STRIP: NEGATIVE
NRBC BLD AUTO-RTO: 0 /100 WBC (ref 0–0.2)
PH UR STRIP.AUTO: <=5 [PH] (ref 5–9)
PLATELET # BLD AUTO: 202 10*3/MM3 (ref 140–450)
PMV BLD AUTO: 9.6 FL (ref 6–12)
POTASSIUM BLD-SCNC: 4.3 MMOL/L (ref 3.5–5.2)
PROT SERPL-MCNC: 7.4 G/DL (ref 6–8.5)
PROT UR QL STRIP: ABNORMAL
RBC # BLD AUTO: 5.19 10*6/MM3 (ref 4.14–5.8)
RBC # UR: ABNORMAL /HPF
REF LAB TEST METHOD: ABNORMAL
SODIUM BLD-SCNC: 135 MMOL/L (ref 136–145)
SP GR UR STRIP: 1.03 (ref 1–1.03)
SQUAMOUS #/AREA URNS HPF: ABNORMAL /HPF
TROPONIN T SERPL-MCNC: <0.01 NG/ML (ref 0–0.03)
UROBILINOGEN UR QL STRIP: ABNORMAL
WBC NRBC COR # BLD: 7.5 10*3/MM3 (ref 3.4–10.8)
WBC UR QL AUTO: ABNORMAL /HPF
WHOLE BLOOD HOLD SPECIMEN: NORMAL
WHOLE BLOOD HOLD SPECIMEN: NORMAL

## 2019-06-28 PROCEDURE — 84484 ASSAY OF TROPONIN QUANT: CPT | Performed by: EMERGENCY MEDICINE

## 2019-06-28 PROCEDURE — 71046 X-RAY EXAM CHEST 2 VIEWS: CPT

## 2019-06-28 PROCEDURE — 96375 TX/PRO/DX INJ NEW DRUG ADDON: CPT

## 2019-06-28 PROCEDURE — 99284 EMERGENCY DEPT VISIT MOD MDM: CPT

## 2019-06-28 PROCEDURE — 96374 THER/PROPH/DIAG INJ IV PUSH: CPT

## 2019-06-28 PROCEDURE — 25010000002 ONDANSETRON PER 1 MG: Performed by: EMERGENCY MEDICINE

## 2019-06-28 PROCEDURE — 63710000001 INSULIN REGULAR HUMAN PER 5 UNITS: Performed by: EMERGENCY MEDICINE

## 2019-06-28 PROCEDURE — 96361 HYDRATE IV INFUSION ADD-ON: CPT

## 2019-06-28 PROCEDURE — 74177 CT ABD & PELVIS W/CONTRAST: CPT

## 2019-06-28 PROCEDURE — 82962 GLUCOSE BLOOD TEST: CPT

## 2019-06-28 PROCEDURE — 0 DIATRIZOATE MEGLUMINE & SODIUM PER 1 ML: Performed by: EMERGENCY MEDICINE

## 2019-06-28 PROCEDURE — 83690 ASSAY OF LIPASE: CPT | Performed by: EMERGENCY MEDICINE

## 2019-06-28 PROCEDURE — 81001 URINALYSIS AUTO W/SCOPE: CPT | Performed by: EMERGENCY MEDICINE

## 2019-06-28 PROCEDURE — 93010 ELECTROCARDIOGRAM REPORT: CPT | Performed by: INTERNAL MEDICINE

## 2019-06-28 PROCEDURE — 85025 COMPLETE CBC W/AUTO DIFF WBC: CPT | Performed by: EMERGENCY MEDICINE

## 2019-06-28 PROCEDURE — 80053 COMPREHEN METABOLIC PANEL: CPT | Performed by: EMERGENCY MEDICINE

## 2019-06-28 PROCEDURE — 25010000002 IOPAMIDOL 61 % SOLUTION: Performed by: EMERGENCY MEDICINE

## 2019-06-28 PROCEDURE — 93005 ELECTROCARDIOGRAM TRACING: CPT | Performed by: EMERGENCY MEDICINE

## 2019-06-28 RX ORDER — SODIUM CHLORIDE 0.9 % (FLUSH) 0.9 %
10 SYRINGE (ML) INJECTION AS NEEDED
Status: DISCONTINUED | OUTPATIENT
Start: 2019-06-28 | End: 2019-06-28 | Stop reason: HOSPADM

## 2019-06-28 RX ORDER — LOPERAMIDE HYDROCHLORIDE 2 MG/1
2 CAPSULE ORAL 4 TIMES DAILY PRN
Qty: 8 CAPSULE | Refills: 0 | Status: SHIPPED | OUTPATIENT
Start: 2019-06-28 | End: 2020-09-29

## 2019-06-28 RX ORDER — ALUMINA, MAGNESIA, AND SIMETHICONE 2400; 2400; 240 MG/30ML; MG/30ML; MG/30ML
15 SUSPENSION ORAL ONCE
Status: COMPLETED | OUTPATIENT
Start: 2019-06-28 | End: 2019-06-28

## 2019-06-28 RX ORDER — FAMOTIDINE 10 MG/ML
20 INJECTION, SOLUTION INTRAVENOUS ONCE
Status: COMPLETED | OUTPATIENT
Start: 2019-06-28 | End: 2019-06-28

## 2019-06-28 RX ORDER — CLONIDINE HYDROCHLORIDE 0.2 MG/1
0.2 TABLET ORAL ONCE
Status: DISCONTINUED | OUTPATIENT
Start: 2019-06-28 | End: 2019-06-28 | Stop reason: HOSPADM

## 2019-06-28 RX ORDER — SODIUM CHLORIDE 9 MG/ML
125 INJECTION, SOLUTION INTRAVENOUS CONTINUOUS
Status: DISCONTINUED | OUTPATIENT
Start: 2019-06-28 | End: 2019-06-28 | Stop reason: HOSPADM

## 2019-06-28 RX ORDER — DICYCLOMINE HCL 20 MG
20 TABLET ORAL 4 TIMES DAILY PRN
Qty: 6 TABLET | Refills: 0 | Status: SHIPPED | OUTPATIENT
Start: 2019-06-28 | End: 2020-09-29

## 2019-06-28 RX ORDER — ONDANSETRON 2 MG/ML
4 INJECTION INTRAMUSCULAR; INTRAVENOUS ONCE
Status: COMPLETED | OUTPATIENT
Start: 2019-06-28 | End: 2019-06-28

## 2019-06-28 RX ADMIN — SODIUM CHLORIDE, PRESERVATIVE FREE 10 ML: 5 INJECTION INTRAVENOUS at 02:09

## 2019-06-28 RX ADMIN — IOPAMIDOL 96 ML: 612 INJECTION, SOLUTION INTRAVENOUS at 03:51

## 2019-06-28 RX ADMIN — ALUMINUM HYDROXIDE, MAGNESIUM HYDROXIDE, AND DIMETHICONE 15 ML: 400; 400; 40 SUSPENSION ORAL at 02:29

## 2019-06-28 RX ADMIN — SODIUM CHLORIDE 125 ML/HR: 9 INJECTION, SOLUTION INTRAVENOUS at 02:29

## 2019-06-28 RX ADMIN — DIATRIZOATE MEGLUMINE AND DIATRIZOATE SODIUM 120 ML: 660; 100 LIQUID ORAL; RECTAL at 03:51

## 2019-06-28 RX ADMIN — HUMAN INSULIN 4 UNITS: 100 INJECTION, SOLUTION SUBCUTANEOUS at 03:23

## 2019-06-28 RX ADMIN — FAMOTIDINE 20 MG: 10 INJECTION, SOLUTION INTRAVENOUS at 02:08

## 2019-06-28 RX ADMIN — SODIUM CHLORIDE 500 ML: 9 INJECTION, SOLUTION INTRAVENOUS at 01:52

## 2019-06-28 RX ADMIN — ONDANSETRON 4 MG: 2 INJECTION INTRAMUSCULAR; INTRAVENOUS at 02:04

## 2019-06-28 RX ADMIN — LIDOCAINE HYDROCHLORIDE 15 ML: 20 SOLUTION ORAL; TOPICAL at 02:29

## 2019-06-28 RX ADMIN — Medication 10 ML: at 01:18

## 2019-06-28 NOTE — OUTREACH NOTE
Medical Week 2 Survey      Responses   Facility patient discharged from?  [place]   Does the patient have one of the following disease processes/diagnoses(primary or secondary)?  Other   Week 2 attempt successful?  No   Unsuccessful attempts  Attempt 2          Melissa Cisneros RN

## 2019-07-01 ENCOUNTER — READMISSION MANAGEMENT (OUTPATIENT)
Dept: CALL CENTER | Facility: HOSPITAL | Age: 39
End: 2019-07-01

## 2019-07-01 NOTE — OUTREACH NOTE
Medical Week 3 Survey      Responses   Facility patient discharged from?  [facility]   Does the patient have one of the following disease processes/diagnoses(primary or secondary)?  Other   Week 3 attempt successful?  No   Unsuccessful attempts  Attempt 1          Lou Jose RN

## 2019-07-02 ENCOUNTER — EPISODE CHANGES (OUTPATIENT)
Dept: CASE MANAGEMENT | Facility: OTHER | Age: 39
End: 2019-07-02

## 2019-07-16 ENCOUNTER — EPISODE CHANGES (OUTPATIENT)
Dept: CASE MANAGEMENT | Facility: OTHER | Age: 39
End: 2019-07-16

## 2019-07-16 ENCOUNTER — PATIENT OUTREACH (OUTPATIENT)
Dept: CASE MANAGEMENT | Facility: OTHER | Age: 39
End: 2019-07-16

## 2019-07-16 NOTE — OUTREACH NOTE
Care Plan Note    Care Management Plan 7/16/2019   Lifestyle Goals Routine follow-up with doctor(s);Lower blood sugar;Self monitor blood sugar   Barriers Disease education   Barriers Medicare not covering test strips   Self Management Home Glucose Monitoring;Get flu/pneumonia shot   Annual Wellness Visit:  Patient Will Schedule   Care Gaps Addressed Diabetic A1C;Diabetic Eye Exam;Flu Shot;Pneumonia Vaccine   Specific Disease Process Teaching Diabetes;Hypertension   Does patient have depression diagnosis? Yes   Advanced Directives: Not Interested At This Time   Ed Visits past 12 months: 3 or more   Hospitalizations past 12 months 3 or more   Medication Adherence Medications understood   Health Literacy Moderate     The main concerns and/or symptoms the patient would like to address are: Patient has had multiple ED visits to [facility] and [facility] for chest pain. He was just discharged from [facility] last Tuesday for MI. He underwent a heart cath with placement of coronary stent. Today he is concerned about not being able to check his blood sugar in over 1 month due to Medicare not covering his test strips. He will have a new insurance plan in August that is supposed to provide him a home BP machine and glucose meter. He will see his new PCP, Dr. Barnett, on Thursday 7-28-19 and plans to discuss the possibility of getting a new meter at that time.     Education/instruction provided by Care Coordinator: He does not eat consistent meals during the day. He has not eaten as of 11:16 AM today. Educated on eating small frequent low carb high protein diet. Educated on protein foods and vegetables. Hgb A1C was 12.1 on 7-10-19. He is aware the A1C is to high and needs to be at least 7 or lower. Educated on the need for exercise. He does not use an assist device for ambulation and has not fallen in the last 6 months. He plans to call his pharmacy today and see if Medicare has approved his strips. The last  he knew about his blood sugar readings they were 200-400. He stated he just adjusts his insulin with his meals. Educated on importance of testing his blood sugar and obtaining strips. He wears his CPAP machine faithfully.  He did not receive his flu vaccine in 2018. He received a pneumonia vaccine in 2017 per his notes he read to me from his paperwork. He is not of age guidelines for colonoscopy and he does not smoke. He has a depression diagnosis. He has an active MyChart and no advacne directive. He has my contact information for needs. Agreeable to care advising.     Follow Up Outreach Due: after new PCP appt.     Bella Julian RN    7/16/2019, 11:10 AM

## 2019-07-26 ENCOUNTER — PATIENT OUTREACH (OUTPATIENT)
Dept: CASE MANAGEMENT | Facility: OTHER | Age: 39
End: 2019-07-26

## 2019-07-26 NOTE — OUTREACH NOTE
Patient Outreach Note    Patient returned my call and stated that he was able to get a new glucometer and strips. His blood sugars have been around 300. Educated patient on diabetic diet. He is in the process of filling out paperwork for a new PCP and is planning to get a new cardiologist as well.     Bella Julian RN    7/26/2019, 1:28 PM

## 2019-08-21 ENCOUNTER — EPISODE CHANGES (OUTPATIENT)
Dept: CASE MANAGEMENT | Facility: OTHER | Age: 39
End: 2019-08-21

## 2020-02-08 ENCOUNTER — HOSPITAL ENCOUNTER (EMERGENCY)
Facility: HOSPITAL | Age: 40
Discharge: HOME | End: 2020-02-08
Attending: EMERGENCY MEDICINE | Admitting: EMERGENCY MEDICINE

## 2020-02-08 VITALS
TEMPERATURE: 98.4 F | WEIGHT: 315 LBS | SYSTOLIC BLOOD PRESSURE: 130 MMHG | HEART RATE: 77 BPM | HEIGHT: 71 IN | OXYGEN SATURATION: 96 % | RESPIRATION RATE: 20 BRPM | BODY MASS INDEX: 44.1 KG/M2 | DIASTOLIC BLOOD PRESSURE: 75 MMHG

## 2020-02-08 DIAGNOSIS — M54.50 BILATERAL LOW BACK PAIN WITHOUT SCIATICA, UNSPECIFIED CHRONICITY: Primary | ICD-10-CM

## 2020-02-08 PROCEDURE — 25010000002 KETOROLAC TROMETHAMINE PER 15 MG: Performed by: EMERGENCY MEDICINE

## 2020-02-08 PROCEDURE — 99283 EMERGENCY DEPT VISIT LOW MDM: CPT

## 2020-02-08 PROCEDURE — 25010000002 HYDROMORPHONE 1 MG/ML SOLUTION: Performed by: EMERGENCY MEDICINE

## 2020-02-08 PROCEDURE — 96372 THER/PROPH/DIAG INJ SC/IM: CPT

## 2020-02-08 RX ORDER — CYCLOBENZAPRINE HCL 10 MG
10 TABLET ORAL 3 TIMES DAILY PRN
Qty: 12 TABLET | Refills: 0 | Status: SHIPPED | OUTPATIENT
Start: 2020-02-08 | End: 2020-09-29

## 2020-02-08 RX ORDER — KETOROLAC TROMETHAMINE 30 MG/ML
30 INJECTION, SOLUTION INTRAMUSCULAR; INTRAVENOUS ONCE
Status: COMPLETED | OUTPATIENT
Start: 2020-02-08 | End: 2020-02-08

## 2020-02-08 RX ORDER — HYDROCODONE BITARTRATE AND ACETAMINOPHEN 5; 325 MG/1; MG/1
1 TABLET ORAL EVERY 6 HOURS PRN
Qty: 6 TABLET | Refills: 0 | Status: ON HOLD | OUTPATIENT
Start: 2020-02-08 | End: 2020-08-30

## 2020-02-08 RX ADMIN — HYDROMORPHONE HYDROCHLORIDE 1 MG: 1 INJECTION, SOLUTION INTRAMUSCULAR; INTRAVENOUS; SUBCUTANEOUS at 22:42

## 2020-02-08 RX ADMIN — KETOROLAC TROMETHAMINE 30 MG: 30 INJECTION, SOLUTION INTRAMUSCULAR; INTRAVENOUS at 22:45

## 2020-02-09 NOTE — ED PROVIDER NOTES
Subjective   39-year-old male history of diabetes, coronary artery disease, chronic back pain, morbid obesity presents with back pain.  Patient reports bilateral lower back pain that is severe, worse with movement.  Denies any saddle anesthesia or urinary incontinence/retention.  Denies any lower extremity pain, numbness, weakness.  Reports pain with walking.  Has had multiple visits to different ERs for similar complaint.  Has a diagnosis of spinal stenosis, has not had outpatient follow-up.          Review of Systems   Constitutional: Negative for chills and fever.   HENT: Negative for rhinorrhea, sore throat and voice change.    Eyes: Negative for pain and redness.   Respiratory: Negative for cough, shortness of breath and wheezing.    Cardiovascular: Negative for chest pain and palpitations.   Gastrointestinal: Negative for abdominal pain, constipation, diarrhea, nausea and vomiting.   Genitourinary: Negative for dysuria and hematuria.   Musculoskeletal: Positive for back pain. Negative for joint swelling and myalgias.   Skin: Negative for pallor and rash.   Neurological: Negative for dizziness, syncope, weakness and headaches.       Past Medical History:   Diagnosis Date   • Asthma    • Chest pain    • Chronic back pain    • Coronary artery disease    • Diabetes mellitus (CMS/HCC)     10.6% was last A1C per patient   • Factor II deficiency (CMS/HCC)    • Fatty liver    • GERD (gastroesophageal reflux disease)    • Hyperlipidemia    • Hypertension    • Morbid obesity (CMS/HCC)    • Pulmonary embolism (CMS/HCC)    • RLS (restless legs syndrome)    • Seizures (CMS/HCC)     last one many years ago   • Sleep apnea     c-pap       Allergies   Allergen Reactions   • Glipizide Other (See Comments) and Unknown (See Comments)     Slurred Speech  Slurred Speech  Hallucinations, Slurred Speech   • Reglan [Metoclopramide] Anxiety   • Tramadol Other (See Comments)     seizures   • Risperidone Other (See Comments)     Slurred  speech  Can't stand, trouble breathing, slurred speech         Past Surgical History:   Procedure Laterality Date   • ADENOIDECTOMY     • CARDIAC CATHETERIZATION N/A 3/15/2017    Procedure: Left Heart Cath;  Surgeon: Edwige Briceno MD;  Location: [facility] CATH INVASIVE LOCATION;  Service:    • CARDIAC CATHETERIZATION N/A 3/15/2017    Procedure: Stent SOPHIA coronary;  Surgeon: Edwige Briceno MD;  Location: [facility] CATH INVASIVE LOCATION;  Service:    • CARDIAC CATHETERIZATION N/A 3/1/2018    Procedure: Left Heart Cath;  Surgeon: Conor Parsons MD;  Location: [facility] CATH INVASIVE LOCATION;  Service:    • CHOLECYSTECTOMY      lap   • CORONARY ANGIOPLASTY WITH STENT PLACEMENT     • SC RT/LT HEART CATHETERS N/A 3/15/2017    Procedure: Percutaneous Coronary Intervention;  Surgeon: Edwige Briceno MD;  Location: [facility] INVASIVE LOCATION;  Service: Cardiovascular   • TONSILLECTOMY     • TRANSESOPHAGEAL ECHOCARDIOGRAM (MONICA)         Family History   Problem Relation Age of Onset   • Heart disease Mother    • Hypertension Mother    • Hyperlipidemia Mother    • Coronary artery disease Mother    • Diabetes Mother    • Obesity Mother    • Kidney failure Mother    • Sleep apnea Father    • Cancer Paternal Grandfather        Social History     Socioeconomic History   • Marital status:      Spouse name: Cori   • Number of children: 0   • Years of education: Not on file   • Highest education level: Not on file   Occupational History   • Occupation: Disabled   Tobacco Use   • Smoking status: Former Smoker     Packs/day: 0.25     Years: 0.50     Pack years: 0.12     Types: Cigarettes     Last attempt to quit:      Years since quittin.1   • Smokeless tobacco: Current User     Types: Snuff   • Tobacco comment: quit smoking 25 years ago; he does use snuff   Substance and Sexual Activity   • Alcohol use: No   • Drug use: No   • Sexual activity: Defer           Objective   Physical Exam   Constitutional: He is oriented to  person, place, and time. He appears well-developed and well-nourished. No distress.   Morbidly obese, appears in pain   HENT:   Head: Normocephalic and atraumatic.   Mouth/Throat: Oropharynx is clear and moist. No oropharyngeal exudate.   Eyes: Pupils are equal, round, and reactive to light. Conjunctivae and EOM are normal.   Neck: Normal range of motion. Neck supple. No JVD present.   Cardiovascular: Normal rate, regular rhythm, normal heart sounds and intact distal pulses. Exam reveals no gallop and no friction rub.   No murmur heard.  Pulmonary/Chest: Effort normal and breath sounds normal. No stridor. He has no wheezes. He has no rales.   Abdominal: Soft. Bowel sounds are normal. He exhibits no distension. There is no tenderness.   Musculoskeletal: Normal range of motion. He exhibits no edema or deformity.   Mild bilateral paraspinal tenderness   Neurological: He is alert and oriented to person, place, and time. He displays normal reflexes. No sensory deficit. He exhibits normal muscle tone.   Skin: Skin is warm and dry. Capillary refill takes less than 2 seconds. No rash noted. He is not diaphoretic. No erythema.   Nursing note and vitals reviewed.      Procedures           ED Course                                               MDM  Number of Diagnoses or Management Options  Diagnosis management comments: Pt with acute on chronic back pain, needs outpt management and follow up, no neuro deficits at this time       Amount and/or Complexity of Data Reviewed  Clinical lab tests: reviewed  Tests in the medicine section of CPT®: reviewed    Patient Progress  Patient progress: stable      Final diagnoses:   Bilateral low back pain without sciatica, unspecified chronicity            Eduard Rodriguez MD  02/08/20 [id number]

## 2020-08-29 ENCOUNTER — HOSPITAL ENCOUNTER (OUTPATIENT)
Facility: HOSPITAL | Age: 40
Discharge: HOME | End: 2020-09-02
Attending: FAMILY MEDICINE | Admitting: HOSPITALIST

## 2020-08-29 ENCOUNTER — APPOINTMENT (OUTPATIENT)
Dept: GENERAL RADIOLOGY | Facility: HOSPITAL | Age: 40
End: 2020-08-29

## 2020-08-29 DIAGNOSIS — I25.10 CORONARY ARTERY DISEASE INVOLVING NATIVE CORONARY ARTERY OF NATIVE HEART WITHOUT ANGINA PECTORIS: ICD-10-CM

## 2020-08-29 DIAGNOSIS — R07.9 CHEST PAIN WITH HIGH RISK OF ACUTE CORONARY SYNDROME: Primary | ICD-10-CM

## 2020-08-29 LAB
ALBUMIN SERPL-MCNC: 3.7 G/DL (ref 3.5–5.2)
ALBUMIN/GLOB SERPL: 1 G/DL
ALP SERPL-CCNC: 79 U/L (ref 39–117)
ALT SERPL W P-5'-P-CCNC: 26 U/L (ref 1–41)
ANION GAP SERPL CALCULATED.3IONS-SCNC: 13 MMOL/L (ref 5–15)
AST SERPL-CCNC: 25 U/L (ref 1–40)
BASOPHILS # BLD AUTO: 0.05 10*3/MM3 (ref 0–0.2)
BASOPHILS NFR BLD AUTO: 0.5 % (ref 0–1.5)
BILIRUB SERPL-MCNC: 0.5 MG/DL (ref 0–1.2)
BUN SERPL-MCNC: 11 MG/DL (ref 6–20)
BUN/CREAT SERPL: 13.9 (ref 7–25)
CALCIUM SPEC-SCNC: 9.6 MG/DL (ref 8.6–10.5)
CHLORIDE SERPL-SCNC: 94 MMOL/L (ref 98–107)
CO2 SERPL-SCNC: 27 MMOL/L (ref 22–29)
CREAT SERPL-MCNC: 0.79 MG/DL (ref 0.76–1.27)
D-DIMER, QUANTITATIVE (MAD,POW, STR): 299 NG/ML (FEU) (ref 0–470)
DEPRECATED RDW RBC AUTO: 39.5 FL (ref 37–54)
EOSINOPHIL # BLD AUTO: 0.29 10*3/MM3 (ref 0–0.4)
EOSINOPHIL NFR BLD AUTO: 3 % (ref 0.3–6.2)
ERYTHROCYTE [DISTWIDTH] IN BLOOD BY AUTOMATED COUNT: 13.6 % (ref 12.3–15.4)
GFR SERPL CREATININE-BSD FRML MDRD: 108 ML/MIN/1.73
GLOBULIN UR ELPH-MCNC: 3.8 GM/DL
GLUCOSE SERPL-MCNC: 186 MG/DL (ref 65–99)
HCT VFR BLD AUTO: 42 % (ref 37.5–51)
HGB BLD-MCNC: 14.4 G/DL (ref 13–17.7)
HOLD SPECIMEN: NORMAL
IMM GRANULOCYTES # BLD AUTO: 0.12 10*3/MM3 (ref 0–0.05)
IMM GRANULOCYTES NFR BLD AUTO: 1.3 % (ref 0–0.5)
LYMPHOCYTES # BLD AUTO: 1.83 10*3/MM3 (ref 0.7–3.1)
LYMPHOCYTES NFR BLD AUTO: 19.2 % (ref 19.6–45.3)
MCH RBC QN AUTO: 27.7 PG (ref 26.6–33)
MCHC RBC AUTO-ENTMCNC: 34.3 G/DL (ref 31.5–35.7)
MCV RBC AUTO: 80.9 FL (ref 79–97)
MONOCYTES # BLD AUTO: 0.79 10*3/MM3 (ref 0.1–0.9)
MONOCYTES NFR BLD AUTO: 8.3 % (ref 5–12)
NEUTROPHILS NFR BLD AUTO: 6.43 10*3/MM3 (ref 1.7–7)
NEUTROPHILS NFR BLD AUTO: 67.7 % (ref 42.7–76)
NRBC BLD AUTO-RTO: 0 /100 WBC (ref 0–0.2)
NT-PROBNP SERPL-MCNC: 29.7 PG/ML (ref 0–450)
PLATELET # BLD AUTO: 209 10*3/MM3 (ref 140–450)
PMV BLD AUTO: 9 FL (ref 6–12)
POTASSIUM SERPL-SCNC: 3.8 MMOL/L (ref 3.5–5.2)
PROT SERPL-MCNC: 7.5 G/DL (ref 6–8.5)
RBC # BLD AUTO: 5.19 10*6/MM3 (ref 4.14–5.8)
SODIUM SERPL-SCNC: 134 MMOL/L (ref 136–145)
TROPONIN T SERPL-MCNC: <0.01 NG/ML (ref 0–0.03)
WBC # BLD AUTO: 9.51 10*3/MM3 (ref 3.4–10.8)
WHOLE BLOOD HOLD SPECIMEN: NORMAL
WHOLE BLOOD HOLD SPECIMEN: NORMAL

## 2020-08-29 PROCEDURE — 96375 TX/PRO/DX INJ NEW DRUG ADDON: CPT

## 2020-08-29 PROCEDURE — 84484 ASSAY OF TROPONIN QUANT: CPT | Performed by: FAMILY MEDICINE

## 2020-08-29 PROCEDURE — G0378 HOSPITAL OBSERVATION PER HR: HCPCS

## 2020-08-29 PROCEDURE — 83880 ASSAY OF NATRIURETIC PEPTIDE: CPT | Performed by: FAMILY MEDICINE

## 2020-08-29 PROCEDURE — 85025 COMPLETE CBC W/AUTO DIFF WBC: CPT | Performed by: FAMILY MEDICINE

## 2020-08-29 PROCEDURE — 96361 HYDRATE IV INFUSION ADD-ON: CPT

## 2020-08-29 PROCEDURE — 93005 ELECTROCARDIOGRAM TRACING: CPT | Performed by: FAMILY MEDICINE

## 2020-08-29 PROCEDURE — 96374 THER/PROPH/DIAG INJ IV PUSH: CPT

## 2020-08-29 PROCEDURE — C9803 HOPD COVID-19 SPEC COLLECT: HCPCS

## 2020-08-29 PROCEDURE — 85379 FIBRIN DEGRADATION QUANT: CPT | Performed by: EMERGENCY MEDICINE

## 2020-08-29 PROCEDURE — 87635 SARS-COV-2 COVID-19 AMP PRB: CPT | Performed by: EMERGENCY MEDICINE

## 2020-08-29 PROCEDURE — 93010 ELECTROCARDIOGRAM REPORT: CPT | Performed by: INTERNAL MEDICINE

## 2020-08-29 PROCEDURE — 99284 EMERGENCY DEPT VISIT MOD MDM: CPT

## 2020-08-29 PROCEDURE — 84484 ASSAY OF TROPONIN QUANT: CPT | Performed by: HOSPITALIST

## 2020-08-29 PROCEDURE — 94760 N-INVAS EAR/PLS OXIMETRY 1: CPT

## 2020-08-29 PROCEDURE — 71045 X-RAY EXAM CHEST 1 VIEW: CPT

## 2020-08-29 PROCEDURE — 25010000002 MORPHINE PER 10 MG: Performed by: HOSPITALIST

## 2020-08-29 PROCEDURE — 94640 AIRWAY INHALATION TREATMENT: CPT

## 2020-08-29 PROCEDURE — 80053 COMPREHEN METABOLIC PANEL: CPT | Performed by: FAMILY MEDICINE

## 2020-08-29 RX ORDER — SODIUM CHLORIDE 0.9 % (FLUSH) 0.9 %
10 SYRINGE (ML) INJECTION AS NEEDED
Status: DISCONTINUED | OUTPATIENT
Start: 2020-08-29 | End: 2020-09-02 | Stop reason: HOSPADM

## 2020-08-29 RX ORDER — FAMOTIDINE 40 MG/1
40 TABLET, FILM COATED ORAL DAILY
Status: DISCONTINUED | OUTPATIENT
Start: 2020-08-30 | End: 2020-08-29

## 2020-08-29 RX ORDER — HYDROCODONE BITARTRATE AND ACETAMINOPHEN 5; 325 MG/1; MG/1
1 TABLET ORAL EVERY 4 HOURS PRN
Status: DISCONTINUED | OUTPATIENT
Start: 2020-08-29 | End: 2020-09-02 | Stop reason: HOSPADM

## 2020-08-29 RX ORDER — AMLODIPINE BESYLATE 10 MG/1
10 TABLET ORAL NIGHTLY
Status: DISCONTINUED | OUTPATIENT
Start: 2020-08-29 | End: 2020-09-02 | Stop reason: HOSPADM

## 2020-08-29 RX ORDER — ONDANSETRON 4 MG/1
4 TABLET, FILM COATED ORAL EVERY 6 HOURS PRN
Status: DISCONTINUED | OUTPATIENT
Start: 2020-08-29 | End: 2020-09-02 | Stop reason: HOSPADM

## 2020-08-29 RX ORDER — CYCLOBENZAPRINE HCL 10 MG
10 TABLET ORAL 3 TIMES DAILY PRN
Status: DISCONTINUED | OUTPATIENT
Start: 2020-08-29 | End: 2020-09-02 | Stop reason: HOSPADM

## 2020-08-29 RX ORDER — FAMOTIDINE 10 MG/ML
20 INJECTION, SOLUTION INTRAVENOUS ONCE
Status: COMPLETED | OUTPATIENT
Start: 2020-08-29 | End: 2020-08-29

## 2020-08-29 RX ORDER — ASPIRIN 81 MG/1
324 TABLET, CHEWABLE ORAL ONCE
Status: COMPLETED | OUTPATIENT
Start: 2020-08-29 | End: 2020-08-29

## 2020-08-29 RX ORDER — RANOLAZINE 500 MG/1
1000 TABLET, EXTENDED RELEASE ORAL EVERY 12 HOURS SCHEDULED
Status: DISCONTINUED | OUTPATIENT
Start: 2020-08-29 | End: 2020-09-02 | Stop reason: HOSPADM

## 2020-08-29 RX ORDER — ACETAMINOPHEN 325 MG/1
650 TABLET ORAL EVERY 4 HOURS PRN
Status: DISCONTINUED | OUTPATIENT
Start: 2020-08-29 | End: 2020-09-02 | Stop reason: HOSPADM

## 2020-08-29 RX ORDER — ISOSORBIDE MONONITRATE 60 MG/1
120 TABLET, EXTENDED RELEASE ORAL DAILY
Status: DISCONTINUED | OUTPATIENT
Start: 2020-08-30 | End: 2020-09-02 | Stop reason: HOSPADM

## 2020-08-29 RX ORDER — PANTOPRAZOLE SODIUM 40 MG/1
40 TABLET, DELAYED RELEASE ORAL NIGHTLY
Status: DISCONTINUED | OUTPATIENT
Start: 2020-08-30 | End: 2020-09-02 | Stop reason: HOSPADM

## 2020-08-29 RX ORDER — SERTRALINE HYDROCHLORIDE 25 MG/1
25 TABLET, FILM COATED ORAL NIGHTLY
Status: DISCONTINUED | OUTPATIENT
Start: 2020-08-29 | End: 2020-09-02 | Stop reason: HOSPADM

## 2020-08-29 RX ORDER — BISACODYL 5 MG/1
5 TABLET, DELAYED RELEASE ORAL DAILY PRN
Status: DISCONTINUED | OUTPATIENT
Start: 2020-08-29 | End: 2020-09-02 | Stop reason: HOSPADM

## 2020-08-29 RX ORDER — ACETAMINOPHEN 160 MG/5ML
650 SOLUTION ORAL EVERY 4 HOURS PRN
Status: DISCONTINUED | OUTPATIENT
Start: 2020-08-29 | End: 2020-08-29 | Stop reason: SDUPTHER

## 2020-08-29 RX ORDER — HYDRALAZINE HYDROCHLORIDE 25 MG/1
25 TABLET, FILM COATED ORAL 3 TIMES DAILY
Status: DISCONTINUED | OUTPATIENT
Start: 2020-08-29 | End: 2020-09-02 | Stop reason: HOSPADM

## 2020-08-29 RX ORDER — INSULIN GLARGINE 100 [IU]/ML
60 INJECTION, SOLUTION SUBCUTANEOUS NIGHTLY
COMMUNITY
End: 2020-09-29 | Stop reason: SDUPTHER

## 2020-08-29 RX ORDER — DEXTROSE MONOHYDRATE 25 G/50ML
25 INJECTION, SOLUTION INTRAVENOUS
Status: DISCONTINUED | OUTPATIENT
Start: 2020-08-29 | End: 2020-09-02 | Stop reason: HOSPADM

## 2020-08-29 RX ORDER — NICOTINE POLACRILEX 4 MG
15 LOZENGE BUCCAL
Status: DISCONTINUED | OUTPATIENT
Start: 2020-08-29 | End: 2020-09-02 | Stop reason: HOSPADM

## 2020-08-29 RX ORDER — MORPHINE SULFATE 2 MG/ML
1 INJECTION, SOLUTION INTRAMUSCULAR; INTRAVENOUS EVERY 4 HOURS PRN
Status: DISCONTINUED | OUTPATIENT
Start: 2020-08-29 | End: 2020-09-02 | Stop reason: HOSPADM

## 2020-08-29 RX ORDER — NALOXONE HCL 0.4 MG/ML
0.4 VIAL (ML) INJECTION
Status: DISCONTINUED | OUTPATIENT
Start: 2020-08-29 | End: 2020-09-02 | Stop reason: HOSPADM

## 2020-08-29 RX ORDER — ATORVASTATIN CALCIUM 40 MG/1
80 TABLET, FILM COATED ORAL DAILY
Status: DISCONTINUED | OUTPATIENT
Start: 2020-08-30 | End: 2020-09-02 | Stop reason: HOSPADM

## 2020-08-29 RX ORDER — TRAZODONE HYDROCHLORIDE 100 MG/1
300 TABLET ORAL NIGHTLY PRN
Status: DISCONTINUED | OUTPATIENT
Start: 2020-08-29 | End: 2020-09-02 | Stop reason: HOSPADM

## 2020-08-29 RX ORDER — BUDESONIDE AND FORMOTEROL FUMARATE DIHYDRATE 160; 4.5 UG/1; UG/1
2 AEROSOL RESPIRATORY (INHALATION)
Status: DISCONTINUED | OUTPATIENT
Start: 2020-08-29 | End: 2020-09-02 | Stop reason: HOSPADM

## 2020-08-29 RX ORDER — SODIUM CHLORIDE 9 MG/ML
125 INJECTION, SOLUTION INTRAVENOUS CONTINUOUS
Status: DISCONTINUED | OUTPATIENT
Start: 2020-08-29 | End: 2020-09-01

## 2020-08-29 RX ORDER — GABAPENTIN 300 MG/1
300 CAPSULE ORAL NIGHTLY
Status: DISCONTINUED | OUTPATIENT
Start: 2020-08-29 | End: 2020-09-02 | Stop reason: HOSPADM

## 2020-08-29 RX ORDER — LISINOPRIL 40 MG/1
40 TABLET ORAL NIGHTLY
Status: DISCONTINUED | OUTPATIENT
Start: 2020-08-29 | End: 2020-09-02 | Stop reason: HOSPADM

## 2020-08-29 RX ORDER — SODIUM CHLORIDE 0.9 % (FLUSH) 0.9 %
10 SYRINGE (ML) INJECTION EVERY 12 HOURS SCHEDULED
Status: DISCONTINUED | OUTPATIENT
Start: 2020-08-29 | End: 2020-09-02 | Stop reason: HOSPADM

## 2020-08-29 RX ORDER — ONDANSETRON 2 MG/ML
4 INJECTION INTRAMUSCULAR; INTRAVENOUS EVERY 6 HOURS PRN
Status: DISCONTINUED | OUTPATIENT
Start: 2020-08-29 | End: 2020-09-02 | Stop reason: HOSPADM

## 2020-08-29 RX ORDER — ACETAMINOPHEN 650 MG/1
650 SUPPOSITORY RECTAL EVERY 4 HOURS PRN
Status: DISCONTINUED | OUTPATIENT
Start: 2020-08-29 | End: 2020-09-02 | Stop reason: HOSPADM

## 2020-08-29 RX ORDER — ASPIRIN 81 MG/1
81 TABLET, CHEWABLE ORAL DAILY
COMMUNITY
End: 2020-10-15

## 2020-08-29 RX ADMIN — SERTRALINE 25 MG: 25 TABLET, FILM COATED ORAL at 22:47

## 2020-08-29 RX ADMIN — RIVAROXABAN 20 MG: 10 TABLET, FILM COATED ORAL at 22:46

## 2020-08-29 RX ADMIN — RANOLAZINE 1000 MG: 500 TABLET, FILM COATED, EXTENDED RELEASE ORAL at 22:46

## 2020-08-29 RX ADMIN — MORPHINE SULFATE 1 MG: 2 INJECTION, SOLUTION INTRAMUSCULAR; INTRAVENOUS at 22:45

## 2020-08-29 RX ADMIN — SODIUM CHLORIDE 125 ML/HR: 9 INJECTION, SOLUTION INTRAVENOUS at 20:26

## 2020-08-29 RX ADMIN — GABAPENTIN 300 MG: 300 CAPSULE ORAL at 22:45

## 2020-08-29 RX ADMIN — SODIUM CHLORIDE 125 ML/HR: 9 INJECTION, SOLUTION INTRAVENOUS at 22:47

## 2020-08-29 RX ADMIN — AMLODIPINE BESYLATE 10 MG: 10 TABLET ORAL at 22:45

## 2020-08-29 RX ADMIN — ASPIRIN 81 MG 324 MG: 81 TABLET ORAL at 18:55

## 2020-08-29 RX ADMIN — FAMOTIDINE 20 MG: 10 INJECTION INTRAVENOUS at 20:26

## 2020-08-29 RX ADMIN — BUDESONIDE AND FORMOTEROL FUMARATE DIHYDRATE 2 PUFF: 160; 4.5 AEROSOL RESPIRATORY (INHALATION) at 23:12

## 2020-08-29 RX ADMIN — LISINOPRIL 40 MG: 40 TABLET ORAL at 22:45

## 2020-08-29 RX ADMIN — NITROGLYCERIN 1 INCH: 20 OINTMENT TOPICAL at 20:26

## 2020-08-30 LAB
ANION GAP SERPL CALCULATED.3IONS-SCNC: 13 MMOL/L (ref 5–15)
BUN SERPL-MCNC: 12 MG/DL (ref 6–20)
BUN/CREAT SERPL: 13.8 (ref 7–25)
CALCIUM SPEC-SCNC: 9.1 MG/DL (ref 8.6–10.5)
CHLORIDE SERPL-SCNC: 96 MMOL/L (ref 98–107)
CO2 SERPL-SCNC: 28 MMOL/L (ref 22–29)
CREAT SERPL-MCNC: 0.87 MG/DL (ref 0.76–1.27)
DEPRECATED RDW RBC AUTO: 41.6 FL (ref 37–54)
ERYTHROCYTE [DISTWIDTH] IN BLOOD BY AUTOMATED COUNT: 13.6 % (ref 12.3–15.4)
GFR SERPL CREATININE-BSD FRML MDRD: 97 ML/MIN/1.73
GLUCOSE BLDC GLUCOMTR-MCNC: 172 MG/DL (ref 70–130)
GLUCOSE BLDC GLUCOMTR-MCNC: 225 MG/DL (ref 70–130)
GLUCOSE SERPL-MCNC: 176 MG/DL (ref 65–99)
HCT VFR BLD AUTO: 39.6 % (ref 37.5–51)
HGB BLD-MCNC: 13 G/DL (ref 13–17.7)
MAGNESIUM SERPL-MCNC: 1.9 MG/DL (ref 1.6–2.6)
MCH RBC QN AUTO: 27.5 PG (ref 26.6–33)
MCHC RBC AUTO-ENTMCNC: 32.8 G/DL (ref 31.5–35.7)
MCV RBC AUTO: 83.9 FL (ref 79–97)
PHOSPHATE SERPL-MCNC: 4.8 MG/DL (ref 2.5–4.5)
PLATELET # BLD AUTO: 206 10*3/MM3 (ref 140–450)
PMV BLD AUTO: 8.9 FL (ref 6–12)
POTASSIUM SERPL-SCNC: 3.9 MMOL/L (ref 3.5–5.2)
RBC # BLD AUTO: 4.72 10*6/MM3 (ref 4.14–5.8)
SARS-COV-2 N GENE RESP QL NAA+PROBE: NOT DETECTED
SODIUM SERPL-SCNC: 137 MMOL/L (ref 136–145)
TROPONIN T SERPL-MCNC: <0.01 NG/ML (ref 0–0.03)
WBC # BLD AUTO: 8.14 10*3/MM3 (ref 3.4–10.8)

## 2020-08-30 PROCEDURE — 94799 UNLISTED PULMONARY SVC/PX: CPT

## 2020-08-30 PROCEDURE — 63710000001 INSULIN ASPART PER 5 UNITS: Performed by: NURSE PRACTITIONER

## 2020-08-30 PROCEDURE — 83735 ASSAY OF MAGNESIUM: CPT | Performed by: HOSPITALIST

## 2020-08-30 PROCEDURE — 84484 ASSAY OF TROPONIN QUANT: CPT | Performed by: HOSPITALIST

## 2020-08-30 PROCEDURE — 96376 TX/PRO/DX INJ SAME DRUG ADON: CPT

## 2020-08-30 PROCEDURE — 85027 COMPLETE CBC AUTOMATED: CPT | Performed by: HOSPITALIST

## 2020-08-30 PROCEDURE — 96361 HYDRATE IV INFUSION ADD-ON: CPT

## 2020-08-30 PROCEDURE — 82962 GLUCOSE BLOOD TEST: CPT

## 2020-08-30 PROCEDURE — 25010000002 MORPHINE PER 10 MG: Performed by: HOSPITALIST

## 2020-08-30 PROCEDURE — 80048 BASIC METABOLIC PNL TOTAL CA: CPT | Performed by: HOSPITALIST

## 2020-08-30 PROCEDURE — G0378 HOSPITAL OBSERVATION PER HR: HCPCS

## 2020-08-30 PROCEDURE — 84100 ASSAY OF PHOSPHORUS: CPT | Performed by: HOSPITALIST

## 2020-08-30 RX ADMIN — RANOLAZINE 1000 MG: 500 TABLET, FILM COATED, EXTENDED RELEASE ORAL at 19:52

## 2020-08-30 RX ADMIN — SODIUM CHLORIDE 125 ML/HR: 9 INJECTION, SOLUTION INTRAVENOUS at 05:37

## 2020-08-30 RX ADMIN — GABAPENTIN 300 MG: 300 CAPSULE ORAL at 19:52

## 2020-08-30 RX ADMIN — SERTRALINE 25 MG: 25 TABLET, FILM COATED ORAL at 19:53

## 2020-08-30 RX ADMIN — MORPHINE SULFATE 1 MG: 2 INJECTION, SOLUTION INTRAMUSCULAR; INTRAVENOUS at 02:50

## 2020-08-30 RX ADMIN — ISOSORBIDE MONONITRATE 120 MG: 60 TABLET, EXTENDED RELEASE ORAL at 08:27

## 2020-08-30 RX ADMIN — BUDESONIDE AND FORMOTEROL FUMARATE DIHYDRATE 2 PUFF: 160; 4.5 AEROSOL RESPIRATORY (INHALATION) at 08:08

## 2020-08-30 RX ADMIN — HYDRALAZINE HYDROCHLORIDE 25 MG: 25 TABLET, FILM COATED ORAL at 08:26

## 2020-08-30 RX ADMIN — SODIUM CHLORIDE 125 ML/HR: 9 INJECTION, SOLUTION INTRAVENOUS at 16:47

## 2020-08-30 RX ADMIN — BUDESONIDE AND FORMOTEROL FUMARATE DIHYDRATE 2 PUFF: 160; 4.5 AEROSOL RESPIRATORY (INHALATION) at 19:28

## 2020-08-30 RX ADMIN — AMLODIPINE BESYLATE 10 MG: 10 TABLET ORAL at 19:52

## 2020-08-30 RX ADMIN — HYDRALAZINE HYDROCHLORIDE 25 MG: 25 TABLET, FILM COATED ORAL at 16:47

## 2020-08-30 RX ADMIN — MORPHINE SULFATE 1 MG: 2 INJECTION, SOLUTION INTRAMUSCULAR; INTRAVENOUS at 14:46

## 2020-08-30 RX ADMIN — RIVAROXABAN 20 MG: 10 TABLET, FILM COATED ORAL at 16:48

## 2020-08-30 RX ADMIN — LISINOPRIL 40 MG: 40 TABLET ORAL at 19:53

## 2020-08-30 RX ADMIN — PANTOPRAZOLE SODIUM 40 MG: 40 TABLET, DELAYED RELEASE ORAL at 19:52

## 2020-08-30 RX ADMIN — HYDRALAZINE HYDROCHLORIDE 25 MG: 25 TABLET, FILM COATED ORAL at 19:51

## 2020-08-30 RX ADMIN — ATORVASTATIN CALCIUM 80 MG: 40 TABLET, FILM COATED ORAL at 08:26

## 2020-08-30 RX ADMIN — INSULIN ASPART 5 UNITS: 100 INJECTION, SOLUTION INTRAVENOUS; SUBCUTANEOUS at 16:47

## 2020-08-30 RX ADMIN — MORPHINE SULFATE 1 MG: 2 INJECTION, SOLUTION INTRAMUSCULAR; INTRAVENOUS at 19:53

## 2020-08-30 RX ADMIN — RANOLAZINE 1000 MG: 500 TABLET, FILM COATED, EXTENDED RELEASE ORAL at 08:26

## 2020-08-31 ENCOUNTER — APPOINTMENT (OUTPATIENT)
Dept: CARDIOLOGY | Facility: HOSPITAL | Age: 40
End: 2020-08-31

## 2020-08-31 ENCOUNTER — APPOINTMENT (OUTPATIENT)
Dept: NUCLEAR MEDICINE | Facility: HOSPITAL | Age: 40
End: 2020-08-31

## 2020-08-31 LAB
ALBUMIN SERPL-MCNC: 3.5 G/DL (ref 3.5–5.2)
ALBUMIN/GLOB SERPL: 0.9 G/DL
ALP SERPL-CCNC: 78 U/L (ref 39–117)
ALT SERPL W P-5'-P-CCNC: 30 U/L (ref 1–41)
ANION GAP SERPL CALCULATED.3IONS-SCNC: 11 MMOL/L (ref 5–15)
AST SERPL-CCNC: 33 U/L (ref 1–40)
BASOPHILS # BLD AUTO: 0.05 10*3/MM3 (ref 0–0.2)
BASOPHILS NFR BLD AUTO: 0.5 % (ref 0–1.5)
BH CV ECHO MEAS - ACS: 3 CM
BH CV ECHO MEAS - AO MAX PG (FULL): 5.5 MMHG
BH CV ECHO MEAS - AO MAX PG: 17.3 MMHG
BH CV ECHO MEAS - AO MEAN PG (FULL): 3 MMHG
BH CV ECHO MEAS - AO MEAN PG: 10 MMHG
BH CV ECHO MEAS - AO ROOT AREA (BSA CORRECTED): 1.3
BH CV ECHO MEAS - AO ROOT AREA: 10.8 CM^2
BH CV ECHO MEAS - AO ROOT DIAM: 3.7 CM
BH CV ECHO MEAS - AO V2 MAX: 208 CM/SEC
BH CV ECHO MEAS - AO V2 MEAN: 154 CM/SEC
BH CV ECHO MEAS - AO V2 VTI: 42.6 CM
BH CV ECHO MEAS - AVA(I,A): 4.6 CM^2
BH CV ECHO MEAS - AVA(I,D): 4.6 CM^2
BH CV ECHO MEAS - AVA(V,A): 4.1 CM^2
BH CV ECHO MEAS - AVA(V,D): 4.1 CM^2
BH CV ECHO MEAS - BSA(HAYCOCK): 3.3 M^2
BH CV ECHO MEAS - BSA: 2.9 M^2
BH CV ECHO MEAS - BZI_BMI: 60.8 KILOGRAMS/M^2
BH CV ECHO MEAS - BZI_METRIC_HEIGHT: 180.3 CM
BH CV ECHO MEAS - BZI_METRIC_WEIGHT: 197.8 KG
BH CV ECHO MEAS - EDV(CUBED): 205.4 ML
BH CV ECHO MEAS - EDV(MOD-SP2): 136 ML
BH CV ECHO MEAS - EDV(MOD-SP4): 128 ML
BH CV ECHO MEAS - EDV(TEICH): 173.2 ML
BH CV ECHO MEAS - EF(CUBED): 80.7 %
BH CV ECHO MEAS - EF(MOD-SP2): 60.4 %
BH CV ECHO MEAS - EF(MOD-SP4): 66.5 %
BH CV ECHO MEAS - EF(TEICH): 72.4 %
BH CV ECHO MEAS - ESV(CUBED): 39.7 ML
BH CV ECHO MEAS - ESV(MOD-SP2): 53.8 ML
BH CV ECHO MEAS - ESV(MOD-SP4): 42.9 ML
BH CV ECHO MEAS - ESV(TEICH): 47.8 ML
BH CV ECHO MEAS - FS: 42.2 %
BH CV ECHO MEAS - IVS/LVPW: 0.96
BH CV ECHO MEAS - IVSD: 0.99 CM
BH CV ECHO MEAS - LA DIMENSION: 3.7 CM
BH CV ECHO MEAS - LA/AO: 1
BH CV ECHO MEAS - LV DIASTOLIC VOL/BSA (35-75): 43.6 ML/M^2
BH CV ECHO MEAS - LV MASS(C)D: 242.9 GRAMS
BH CV ECHO MEAS - LV MASS(C)DI: 82.7 GRAMS/M^2
BH CV ECHO MEAS - LV MAX PG: 11.8 MMHG
BH CV ECHO MEAS - LV MEAN PG: 7 MMHG
BH CV ECHO MEAS - LV SYSTOLIC VOL/BSA (12-30): 14.6 ML/M^2
BH CV ECHO MEAS - LV V1 MAX: 172 CM/SEC
BH CV ECHO MEAS - LV V1 MEAN: 120 CM/SEC
BH CV ECHO MEAS - LV V1 VTI: 40.2 CM
BH CV ECHO MEAS - LVIDD: 5.9 CM
BH CV ECHO MEAS - LVIDS: 3.4 CM
BH CV ECHO MEAS - LVLD AP2: 9.2 CM
BH CV ECHO MEAS - LVLD AP4: 8.3 CM
BH CV ECHO MEAS - LVLS AP2: 7.5 CM
BH CV ECHO MEAS - LVLS AP4: 7.4 CM
BH CV ECHO MEAS - LVOT AREA (M): 4.9 CM^2
BH CV ECHO MEAS - LVOT AREA: 4.9 CM^2
BH CV ECHO MEAS - LVOT DIAM: 2.5 CM
BH CV ECHO MEAS - LVPWD: 1 CM
BH CV ECHO MEAS - MV A MAX VEL: 60.8 CM/SEC
BH CV ECHO MEAS - MV DEC SLOPE: 633 CM/SEC^2
BH CV ECHO MEAS - MV E MAX VEL: 80.1 CM/SEC
BH CV ECHO MEAS - MV E/A: 1.3
BH CV ECHO MEAS - MV MAX PG: 8.4 MMHG
BH CV ECHO MEAS - MV MEAN PG: 3 MMHG
BH CV ECHO MEAS - MV P1/2T MAX VEL: 114 CM/SEC
BH CV ECHO MEAS - MV P1/2T: 52.7 MSEC
BH CV ECHO MEAS - MV V2 MAX: 145 CM/SEC
BH CV ECHO MEAS - MV V2 MEAN: 77 CM/SEC
BH CV ECHO MEAS - MV V2 VTI: 36.3 CM
BH CV ECHO MEAS - MVA P1/2T LCG: 1.9 CM^2
BH CV ECHO MEAS - MVA(P1/2T): 4.2 CM^2
BH CV ECHO MEAS - MVA(VTI): 5.4 CM^2
BH CV ECHO MEAS - PA MAX PG: 8.4 MMHG
BH CV ECHO MEAS - PA V2 MAX: 145 CM/SEC
BH CV ECHO MEAS - RAP SYSTOLE: 5 MMHG
BH CV ECHO MEAS - RVDD: 3.2 CM
BH CV ECHO MEAS - RVSP: 20.5 MMHG
BH CV ECHO MEAS - SI(AO): 156 ML/M^2
BH CV ECHO MEAS - SI(CUBED): 56.4 ML/M^2
BH CV ECHO MEAS - SI(LVOT): 67.2 ML/M^2
BH CV ECHO MEAS - SI(MOD-SP2): 28 ML/M^2
BH CV ECHO MEAS - SI(MOD-SP4): 29 ML/M^2
BH CV ECHO MEAS - SI(TEICH): 42.7 ML/M^2
BH CV ECHO MEAS - SV(AO): 458 ML
BH CV ECHO MEAS - SV(CUBED): 165.7 ML
BH CV ECHO MEAS - SV(LVOT): 197.3 ML
BH CV ECHO MEAS - SV(MOD-SP2): 82.2 ML
BH CV ECHO MEAS - SV(MOD-SP4): 85.1 ML
BH CV ECHO MEAS - SV(TEICH): 125.4 ML
BH CV ECHO MEAS - TR MAX VEL: 197 CM/SEC
BILIRUB SERPL-MCNC: 0.7 MG/DL (ref 0–1.2)
BUN SERPL-MCNC: 11 MG/DL (ref 6–20)
BUN/CREAT SERPL: 14.9 (ref 7–25)
CALCIUM SPEC-SCNC: 9.1 MG/DL (ref 8.6–10.5)
CHLORIDE SERPL-SCNC: 100 MMOL/L (ref 98–107)
CO2 SERPL-SCNC: 23 MMOL/L (ref 22–29)
CREAT SERPL-MCNC: 0.74 MG/DL (ref 0.76–1.27)
DEPRECATED RDW RBC AUTO: 42.1 FL (ref 37–54)
EOSINOPHIL # BLD AUTO: 0.36 10*3/MM3 (ref 0–0.4)
EOSINOPHIL NFR BLD AUTO: 4 % (ref 0.3–6.2)
ERYTHROCYTE [DISTWIDTH] IN BLOOD BY AUTOMATED COUNT: 13.8 % (ref 12.3–15.4)
GFR SERPL CREATININE-BSD FRML MDRD: 117 ML/MIN/1.73
GLOBULIN UR ELPH-MCNC: 3.7 GM/DL
GLUCOSE BLDC GLUCOMTR-MCNC: 159 MG/DL (ref 70–130)
GLUCOSE BLDC GLUCOMTR-MCNC: 193 MG/DL (ref 70–130)
GLUCOSE BLDC GLUCOMTR-MCNC: 208 MG/DL (ref 70–130)
GLUCOSE BLDC GLUCOMTR-MCNC: 213 MG/DL (ref 70–130)
GLUCOSE BLDC GLUCOMTR-MCNC: 217 MG/DL (ref 70–130)
GLUCOSE SERPL-MCNC: 160 MG/DL (ref 65–99)
HCT VFR BLD AUTO: 40 % (ref 37.5–51)
HGB BLD-MCNC: 13.4 G/DL (ref 13–17.7)
IMM GRANULOCYTES # BLD AUTO: 0.21 10*3/MM3 (ref 0–0.05)
IMM GRANULOCYTES NFR BLD AUTO: 2.3 % (ref 0–0.5)
LYMPHOCYTES # BLD AUTO: 1.74 10*3/MM3 (ref 0.7–3.1)
LYMPHOCYTES NFR BLD AUTO: 19.1 % (ref 19.6–45.3)
MAXIMAL PREDICTED HEART RATE: 179 BPM
MCH RBC QN AUTO: 28 PG (ref 26.6–33)
MCHC RBC AUTO-ENTMCNC: 33.5 G/DL (ref 31.5–35.7)
MCV RBC AUTO: 83.5 FL (ref 79–97)
MONOCYTES # BLD AUTO: 0.66 10*3/MM3 (ref 0.1–0.9)
MONOCYTES NFR BLD AUTO: 7.2 % (ref 5–12)
NEUTROPHILS NFR BLD AUTO: 6.09 10*3/MM3 (ref 1.7–7)
NEUTROPHILS NFR BLD AUTO: 66.9 % (ref 42.7–76)
NRBC BLD AUTO-RTO: 0.5 /100 WBC (ref 0–0.2)
PLATELET # BLD AUTO: 209 10*3/MM3 (ref 140–450)
PMV BLD AUTO: 9.1 FL (ref 6–12)
POTASSIUM SERPL-SCNC: 4.3 MMOL/L (ref 3.5–5.2)
PROT SERPL-MCNC: 7.2 G/DL (ref 6–8.5)
RBC # BLD AUTO: 4.79 10*6/MM3 (ref 4.14–5.8)
SODIUM SERPL-SCNC: 134 MMOL/L (ref 136–145)
STRESS TARGET HR: 152 BPM
WBC # BLD AUTO: 9.11 10*3/MM3 (ref 3.4–10.8)

## 2020-08-31 PROCEDURE — 80053 COMPREHEN METABOLIC PANEL: CPT | Performed by: NURSE PRACTITIONER

## 2020-08-31 PROCEDURE — 94799 UNLISTED PULMONARY SVC/PX: CPT

## 2020-08-31 PROCEDURE — 0 TECHNETIUM SESTAMIBI: Performed by: HOSPITALIST

## 2020-08-31 PROCEDURE — 96361 HYDRATE IV INFUSION ADD-ON: CPT

## 2020-08-31 PROCEDURE — 82962 GLUCOSE BLOOD TEST: CPT

## 2020-08-31 PROCEDURE — 93018 CV STRESS TEST I&R ONLY: CPT | Performed by: INTERNAL MEDICINE

## 2020-08-31 PROCEDURE — G0378 HOSPITAL OBSERVATION PER HR: HCPCS

## 2020-08-31 PROCEDURE — 63710000001 INSULIN ASPART PER 5 UNITS: Performed by: NURSE PRACTITIONER

## 2020-08-31 PROCEDURE — 25010000002 MORPHINE PER 10 MG: Performed by: HOSPITALIST

## 2020-08-31 PROCEDURE — 93306 TTE W/DOPPLER COMPLETE: CPT

## 2020-08-31 PROCEDURE — 93306 TTE W/DOPPLER COMPLETE: CPT | Performed by: INTERNAL MEDICINE

## 2020-08-31 PROCEDURE — 93016 CV STRESS TEST SUPVJ ONLY: CPT | Performed by: INTERNAL MEDICINE

## 2020-08-31 PROCEDURE — A9500 TC99M SESTAMIBI: HCPCS | Performed by: HOSPITALIST

## 2020-08-31 PROCEDURE — 93017 CV STRESS TEST TRACING ONLY: CPT

## 2020-08-31 PROCEDURE — 85025 COMPLETE CBC W/AUTO DIFF WBC: CPT | Performed by: NURSE PRACTITIONER

## 2020-08-31 PROCEDURE — 96376 TX/PRO/DX INJ SAME DRUG ADON: CPT

## 2020-08-31 PROCEDURE — 78452 HT MUSCLE IMAGE SPECT MULT: CPT

## 2020-08-31 PROCEDURE — 78452 HT MUSCLE IMAGE SPECT MULT: CPT | Performed by: INTERNAL MEDICINE

## 2020-08-31 RX ORDER — PRAMIPEXOLE DIHYDROCHLORIDE 1 MG/1
2 TABLET ORAL NIGHTLY
Status: DISCONTINUED | OUTPATIENT
Start: 2020-08-31 | End: 2020-09-02 | Stop reason: HOSPADM

## 2020-08-31 RX ADMIN — PRAMIPEXOLE DIHYDROCHLORIDE 2 MG: 1 TABLET ORAL at 21:05

## 2020-08-31 RX ADMIN — ISOSORBIDE MONONITRATE 120 MG: 60 TABLET, EXTENDED RELEASE ORAL at 08:33

## 2020-08-31 RX ADMIN — HYDRALAZINE HYDROCHLORIDE 25 MG: 25 TABLET, FILM COATED ORAL at 17:28

## 2020-08-31 RX ADMIN — INSULIN ASPART 5 UNITS: 100 INJECTION, SOLUTION INTRAVENOUS; SUBCUTANEOUS at 17:28

## 2020-08-31 RX ADMIN — HYDRALAZINE HYDROCHLORIDE 25 MG: 25 TABLET, FILM COATED ORAL at 08:33

## 2020-08-31 RX ADMIN — HYDRALAZINE HYDROCHLORIDE 25 MG: 25 TABLET, FILM COATED ORAL at 20:54

## 2020-08-31 RX ADMIN — SODIUM CHLORIDE, PRESERVATIVE FREE 10 ML: 5 INJECTION INTRAVENOUS at 08:33

## 2020-08-31 RX ADMIN — MORPHINE SULFATE 1 MG: 2 INJECTION, SOLUTION INTRAMUSCULAR; INTRAVENOUS at 21:20

## 2020-08-31 RX ADMIN — RIVAROXABAN 20 MG: 10 TABLET, FILM COATED ORAL at 17:27

## 2020-08-31 RX ADMIN — ATORVASTATIN CALCIUM 80 MG: 40 TABLET, FILM COATED ORAL at 08:33

## 2020-08-31 RX ADMIN — SODIUM CHLORIDE, PRESERVATIVE FREE 10 ML: 5 INJECTION INTRAVENOUS at 20:55

## 2020-08-31 RX ADMIN — TECHNETIUM TC 99M SESTAMIBI 1 DOSE: 1 INJECTION INTRAVENOUS at 09:52

## 2020-08-31 RX ADMIN — BUDESONIDE AND FORMOTEROL FUMARATE DIHYDRATE 2 PUFF: 160; 4.5 AEROSOL RESPIRATORY (INHALATION) at 07:47

## 2020-08-31 RX ADMIN — RANOLAZINE 1000 MG: 500 TABLET, FILM COATED, EXTENDED RELEASE ORAL at 20:54

## 2020-08-31 RX ADMIN — TRAZODONE HYDROCHLORIDE 300 MG: 100 TABLET ORAL at 21:20

## 2020-08-31 RX ADMIN — PANTOPRAZOLE SODIUM 40 MG: 40 TABLET, DELAYED RELEASE ORAL at 20:54

## 2020-08-31 RX ADMIN — GABAPENTIN 300 MG: 300 CAPSULE ORAL at 20:54

## 2020-08-31 RX ADMIN — AMLODIPINE BESYLATE 10 MG: 10 TABLET ORAL at 20:54

## 2020-08-31 RX ADMIN — INSULIN ASPART 3 UNITS: 100 INJECTION, SOLUTION INTRAVENOUS; SUBCUTANEOUS at 08:34

## 2020-08-31 RX ADMIN — SODIUM CHLORIDE 125 ML/HR: 9 INJECTION, SOLUTION INTRAVENOUS at 05:26

## 2020-08-31 RX ADMIN — MORPHINE SULFATE 1 MG: 2 INJECTION, SOLUTION INTRAMUSCULAR; INTRAVENOUS at 15:39

## 2020-08-31 RX ADMIN — LISINOPRIL 40 MG: 40 TABLET ORAL at 20:54

## 2020-08-31 RX ADMIN — NITROGLYCERIN 1 INCH: 20 OINTMENT TOPICAL at 13:40

## 2020-08-31 RX ADMIN — SODIUM CHLORIDE 125 ML/HR: 9 INJECTION, SOLUTION INTRAVENOUS at 21:03

## 2020-08-31 RX ADMIN — INSULIN ASPART 5 UNITS: 100 INJECTION, SOLUTION INTRAVENOUS; SUBCUTANEOUS at 13:40

## 2020-08-31 RX ADMIN — SERTRALINE 25 MG: 25 TABLET, FILM COATED ORAL at 20:54

## 2020-08-31 RX ADMIN — NITROGLYCERIN 1 INCH: 20 OINTMENT TOPICAL at 17:28

## 2020-08-31 RX ADMIN — RANOLAZINE 1000 MG: 500 TABLET, FILM COATED, EXTENDED RELEASE ORAL at 08:33

## 2020-08-31 RX ADMIN — HYDROCODONE BITARTRATE AND ACETAMINOPHEN 1 TABLET: 5; 325 TABLET ORAL at 05:30

## 2020-09-01 ENCOUNTER — APPOINTMENT (OUTPATIENT)
Dept: NUCLEAR MEDICINE | Facility: HOSPITAL | Age: 40
End: 2020-09-01

## 2020-09-01 ENCOUNTER — APPOINTMENT (OUTPATIENT)
Dept: CARDIOLOGY | Facility: HOSPITAL | Age: 40
End: 2020-09-01

## 2020-09-01 LAB
ALBUMIN SERPL-MCNC: 3.7 G/DL (ref 3.5–5.2)
ALBUMIN/GLOB SERPL: 1.1 G/DL
ALP SERPL-CCNC: 79 U/L (ref 39–117)
ALT SERPL W P-5'-P-CCNC: 26 U/L (ref 1–41)
ANION GAP SERPL CALCULATED.3IONS-SCNC: 9 MMOL/L (ref 5–15)
AST SERPL-CCNC: 25 U/L (ref 1–40)
BASOPHILS # BLD AUTO: 0.04 10*3/MM3 (ref 0–0.2)
BASOPHILS NFR BLD AUTO: 0.5 % (ref 0–1.5)
BH CV STRESS BP STAGE 1: NORMAL
BH CV STRESS COMMENTS STAGE 1: NORMAL
BH CV STRESS DOSE REGADENOSON STAGE 1: 0.4
BH CV STRESS DURATION MIN STAGE 1: 0
BH CV STRESS DURATION SEC STAGE 1: 10
BH CV STRESS HR STAGE 1: 69
BH CV STRESS PROTOCOL 1: NORMAL
BH CV STRESS RECOVERY BP: NORMAL MMHG
BH CV STRESS RECOVERY HR: 70 BPM
BH CV STRESS STAGE 1: 1
BILIRUB SERPL-MCNC: 0.5 MG/DL (ref 0–1.2)
BUN SERPL-MCNC: 8 MG/DL (ref 6–20)
BUN/CREAT SERPL: 10.1 (ref 7–25)
CALCIUM SPEC-SCNC: 9 MG/DL (ref 8.6–10.5)
CHLORIDE SERPL-SCNC: 98 MMOL/L (ref 98–107)
CO2 SERPL-SCNC: 28 MMOL/L (ref 22–29)
CREAT SERPL-MCNC: 0.79 MG/DL (ref 0.76–1.27)
DEPRECATED RDW RBC AUTO: 42.7 FL (ref 37–54)
EOSINOPHIL # BLD AUTO: 0.31 10*3/MM3 (ref 0–0.4)
EOSINOPHIL NFR BLD AUTO: 3.8 % (ref 0.3–6.2)
ERYTHROCYTE [DISTWIDTH] IN BLOOD BY AUTOMATED COUNT: 13.8 % (ref 12.3–15.4)
GFR SERPL CREATININE-BSD FRML MDRD: 108 ML/MIN/1.73
GLOBULIN UR ELPH-MCNC: 3.5 GM/DL
GLUCOSE BLDC GLUCOMTR-MCNC: 182 MG/DL (ref 70–130)
GLUCOSE BLDC GLUCOMTR-MCNC: 222 MG/DL (ref 70–130)
GLUCOSE BLDC GLUCOMTR-MCNC: 260 MG/DL (ref 70–130)
GLUCOSE SERPL-MCNC: 182 MG/DL (ref 65–99)
HCT VFR BLD AUTO: 41 % (ref 37.5–51)
HGB BLD-MCNC: 13.4 G/DL (ref 13–17.7)
IMM GRANULOCYTES # BLD AUTO: 0.12 10*3/MM3 (ref 0–0.05)
IMM GRANULOCYTES NFR BLD AUTO: 1.5 % (ref 0–0.5)
LV EF NUC BP: 60 %
LYMPHOCYTES # BLD AUTO: 1.54 10*3/MM3 (ref 0.7–3.1)
LYMPHOCYTES NFR BLD AUTO: 18.8 % (ref 19.6–45.3)
MAXIMAL PREDICTED HEART RATE: 179 BPM
MCH RBC QN AUTO: 27.6 PG (ref 26.6–33)
MCHC RBC AUTO-ENTMCNC: 32.7 G/DL (ref 31.5–35.7)
MCV RBC AUTO: 84.4 FL (ref 79–97)
MONOCYTES # BLD AUTO: 0.61 10*3/MM3 (ref 0.1–0.9)
MONOCYTES NFR BLD AUTO: 7.4 % (ref 5–12)
NEUTROPHILS NFR BLD AUTO: 5.57 10*3/MM3 (ref 1.7–7)
NEUTROPHILS NFR BLD AUTO: 68 % (ref 42.7–76)
NRBC BLD AUTO-RTO: 0 /100 WBC (ref 0–0.2)
PERCENT MAX PREDICTED HR: 41.9 %
PLATELET # BLD AUTO: 203 10*3/MM3 (ref 140–450)
PMV BLD AUTO: 8.7 FL (ref 6–12)
POTASSIUM SERPL-SCNC: 4 MMOL/L (ref 3.5–5.2)
PROT SERPL-MCNC: 7.2 G/DL (ref 6–8.5)
RBC # BLD AUTO: 4.86 10*6/MM3 (ref 4.14–5.8)
SODIUM SERPL-SCNC: 135 MMOL/L (ref 136–145)
STRESS BASELINE BP: NORMAL MMHG
STRESS BASELINE HR: 63 BPM
STRESS PERCENT HR: 49 %
STRESS POST ESTIMATED WORKLOAD: 1 METS
STRESS POST PEAK BP: NORMAL MMHG
STRESS POST PEAK HR: 75 BPM
STRESS TARGET HR: 152 BPM
WBC # BLD AUTO: 8.19 10*3/MM3 (ref 3.4–10.8)

## 2020-09-01 PROCEDURE — 87635 SARS-COV-2 COVID-19 AMP PRB: CPT | Performed by: NURSE PRACTITIONER

## 2020-09-01 PROCEDURE — 25010000002 REGADENOSON 0.4 MG/5ML SOLUTION: Performed by: NURSE PRACTITIONER

## 2020-09-01 PROCEDURE — A9500 TC99M SESTAMIBI: HCPCS | Performed by: HOSPITALIST

## 2020-09-01 PROCEDURE — 94760 N-INVAS EAR/PLS OXIMETRY 1: CPT

## 2020-09-01 PROCEDURE — 96361 HYDRATE IV INFUSION ADD-ON: CPT

## 2020-09-01 PROCEDURE — 82962 GLUCOSE BLOOD TEST: CPT

## 2020-09-01 PROCEDURE — 0 TECHNETIUM SESTAMIBI: Performed by: HOSPITALIST

## 2020-09-01 PROCEDURE — 94799 UNLISTED PULMONARY SVC/PX: CPT

## 2020-09-01 PROCEDURE — 80053 COMPREHEN METABOLIC PANEL: CPT | Performed by: NURSE PRACTITIONER

## 2020-09-01 PROCEDURE — 25010000002 MORPHINE PER 10 MG: Performed by: HOSPITALIST

## 2020-09-01 PROCEDURE — 85025 COMPLETE CBC W/AUTO DIFF WBC: CPT | Performed by: NURSE PRACTITIONER

## 2020-09-01 PROCEDURE — 63710000001 INSULIN ASPART PER 5 UNITS: Performed by: NURSE PRACTITIONER

## 2020-09-01 PROCEDURE — 96376 TX/PRO/DX INJ SAME DRUG ADON: CPT

## 2020-09-01 PROCEDURE — G0378 HOSPITAL OBSERVATION PER HR: HCPCS

## 2020-09-01 RX ORDER — SODIUM CHLORIDE 0.9 % (FLUSH) 0.9 %
10 SYRINGE (ML) INJECTION ONCE
Status: COMPLETED | OUTPATIENT
Start: 2020-09-01 | End: 2020-09-01

## 2020-09-01 RX ADMIN — TECHNETIUM TC 99M SESTAMIBI 1 DOSE: 1 INJECTION INTRAVENOUS at 08:40

## 2020-09-01 RX ADMIN — RANOLAZINE 1000 MG: 500 TABLET, FILM COATED, EXTENDED RELEASE ORAL at 20:28

## 2020-09-01 RX ADMIN — BUDESONIDE AND FORMOTEROL FUMARATE DIHYDRATE 2 PUFF: 160; 4.5 AEROSOL RESPIRATORY (INHALATION) at 07:56

## 2020-09-01 RX ADMIN — INSULIN ASPART 3 UNITS: 100 INJECTION, SOLUTION INTRAVENOUS; SUBCUTANEOUS at 17:37

## 2020-09-01 RX ADMIN — ATORVASTATIN CALCIUM 80 MG: 40 TABLET, FILM COATED ORAL at 10:12

## 2020-09-01 RX ADMIN — REGADENOSON 0.4 MG: 0.08 INJECTION, SOLUTION INTRAVENOUS at 08:40

## 2020-09-01 RX ADMIN — AMLODIPINE BESYLATE 10 MG: 10 TABLET ORAL at 20:31

## 2020-09-01 RX ADMIN — TRAZODONE HYDROCHLORIDE 300 MG: 100 TABLET ORAL at 20:34

## 2020-09-01 RX ADMIN — MORPHINE SULFATE 1 MG: 2 INJECTION, SOLUTION INTRAMUSCULAR; INTRAVENOUS at 18:54

## 2020-09-01 RX ADMIN — HYDRALAZINE HYDROCHLORIDE 25 MG: 25 TABLET, FILM COATED ORAL at 20:28

## 2020-09-01 RX ADMIN — HYDRALAZINE HYDROCHLORIDE 25 MG: 25 TABLET, FILM COATED ORAL at 17:37

## 2020-09-01 RX ADMIN — INSULIN ASPART 3 UNITS: 100 INJECTION, SOLUTION INTRAVENOUS; SUBCUTANEOUS at 10:14

## 2020-09-01 RX ADMIN — HYDRALAZINE HYDROCHLORIDE 25 MG: 25 TABLET, FILM COATED ORAL at 10:13

## 2020-09-01 RX ADMIN — BUDESONIDE AND FORMOTEROL FUMARATE DIHYDRATE 2 PUFF: 160; 4.5 AEROSOL RESPIRATORY (INHALATION) at 19:18

## 2020-09-01 RX ADMIN — PRAMIPEXOLE DIHYDROCHLORIDE 2 MG: 1 TABLET ORAL at 20:31

## 2020-09-01 RX ADMIN — GABAPENTIN 300 MG: 300 CAPSULE ORAL at 20:31

## 2020-09-01 RX ADMIN — NITROGLYCERIN 1 INCH: 20 OINTMENT TOPICAL at 13:19

## 2020-09-01 RX ADMIN — PANTOPRAZOLE SODIUM 40 MG: 40 TABLET, DELAYED RELEASE ORAL at 20:31

## 2020-09-01 RX ADMIN — LISINOPRIL 40 MG: 40 TABLET ORAL at 20:28

## 2020-09-01 RX ADMIN — RANOLAZINE 1000 MG: 500 TABLET, FILM COATED, EXTENDED RELEASE ORAL at 10:13

## 2020-09-01 RX ADMIN — INSULIN ASPART 8 UNITS: 100 INJECTION, SOLUTION INTRAVENOUS; SUBCUTANEOUS at 13:20

## 2020-09-01 RX ADMIN — SODIUM CHLORIDE, PRESERVATIVE FREE 10 ML: 5 INJECTION INTRAVENOUS at 08:41

## 2020-09-01 RX ADMIN — SERTRALINE 25 MG: 25 TABLET, FILM COATED ORAL at 20:28

## 2020-09-01 RX ADMIN — SODIUM CHLORIDE 125 ML/HR: 9 INJECTION, SOLUTION INTRAVENOUS at 11:15

## 2020-09-01 RX ADMIN — NITROGLYCERIN 1 INCH: 20 OINTMENT TOPICAL at 06:08

## 2020-09-01 RX ADMIN — NITROGLYCERIN 1 INCH: 20 OINTMENT TOPICAL at 17:42

## 2020-09-01 RX ADMIN — ISOSORBIDE MONONITRATE 120 MG: 60 TABLET, EXTENDED RELEASE ORAL at 10:13

## 2020-09-01 RX ADMIN — SODIUM CHLORIDE, PRESERVATIVE FREE 10 ML: 5 INJECTION INTRAVENOUS at 20:32

## 2020-09-01 NOTE — PLAN OF CARE
Patient vss. Patient has maintained NPO as of 1900. Patient has complained of pain. See MAR for medication administration. Patient is wearing his CPAP from home. Patient is resting. Will continue to monitor.     Problem: Patient Care Overview  Goal: Plan of Care Review  Outcome: Ongoing (interventions implemented as appropriate)  Flowsheets  Taken 9/1/2020 0242  Progress: no change  Taken 8/31/2020 2105  Plan of Care Reviewed With: patient

## 2020-09-01 NOTE — PROGRESS NOTES
[facility]  INPATIENT PROGRESS NOTE    Length of Stay: 0  Date of Admission: 8/29/2020  Primary Care Physician: Ruslan Gonzalez APRN    Subjective   Chief Complaint: Chest pain    HPI:     9/1/2020: Stress test indicated an intermediate to high study with small to moderate sized area of anterolateral ischemia.  I spoke with Dr. Tejada who is on for interventional cardiology tomorrow.      8/31/2020: Patient is scheduled to undergo a 2-day stress test today.  Has no complaints today.    This is a 39-year-old  male with past medical history of CAD (multiple stents), DM 2, factor II deficiency, fatty liver, GERD, HLD, HTN, morbid obesity, pulmonary embolism and sleep apnea that presented to [facility] on 8/29/2020 with complaints of chest pain.  Patient was recently discharged from an outside hospital 2 days ago and records state he is to be set up for an outpatient heart cath due to his weight.  Patient states he is not aware of a heart cath being scheduled.  He follows with Dr. Briceno for cardiology in [facility].  Patient also reports some lightheadedness.  He states nothing is made the pain worse or better and describes as it feeling like pressure in the center of his chest.  The patient was not having chest pain during evaluation but states it comes and goes about every 3 hours.    Review of Systems   Constitutional: Negative for activity change and fatigue.   HENT: Negative for ear pain and sore throat.    Eyes: Negative for pain and discharge.   Respiratory: Negative for cough and shortness of breath.    Cardiovascular: Positive for chest pain. Negative for palpitations.   Gastrointestinal: Negative for abdominal pain and nausea.   Endocrine: Negative for cold intolerance and heat intolerance.   Genitourinary: Negative for difficulty urinating and dysuria.   Musculoskeletal: Negative for arthralgias and gait problem.   Skin: Negative  for color change and rash.   Neurological: Positive for light-headedness. Negative for dizziness and weakness.   Psychiatric/Behavioral: Negative for agitation and confusion.        Objective    Temp:  [96.7 °F (35.9 °C)-97.8 °F (36.6 °C)] 97.4 °F (36.3 °C)  Heart Rate:  [58-86] 86  Resp:  [16-17] 17  BP: (128-164)/(63-81) 147/63    Physical Exam   Constitutional: He is oriented to person, place, and time. He appears well-developed and well-nourished.   HENT:   Head: Normocephalic and atraumatic.   Eyes: Pupils are equal, round, and reactive to light. EOM are normal.   Neck: Normal range of motion. Neck supple.   Cardiovascular: Normal rate and regular rhythm.   Pulmonary/Chest: Effort normal and breath sounds normal.   Abdominal: Soft. Bowel sounds are normal.   Musculoskeletal: Normal range of motion.   Neurological: He is alert and oriented to person, place, and time.   Skin: Skin is warm and dry.   Psychiatric: He has a normal mood and affect. His behavior is normal.     Results Review:  I have reviewed the labs, radiology results, and diagnostic studies.    Laboratory Data:   Results from last 7 days   Lab Units 09/01/20  0607 08/31/20  0521 08/30/20  0604 08/29/20  1930   SODIUM mmol/L 135* 134* 137 134*   POTASSIUM mmol/L 4.0 4.3 3.9 3.8   CHLORIDE mmol/L 98 100 96* 94*   CO2 mmol/L 28.0 23.0 28.0 27.0   BUN mg/dL 8 11 12 11   CREATININE mg/dL 0.79 0.74* 0.87 0.79   GLUCOSE mg/dL 182* 160* 176* 186*   CALCIUM mg/dL 9.0 9.1 9.1 9.6   BILIRUBIN mg/dL 0.5 0.7  --  0.5   ALK PHOS U/L 79 78  --  79   ALT (SGPT) U/L 26 30  --  26   AST (SGOT) U/L 25 33  --  25   ANION GAP mmol/L 9.0 11.0 13.0 13.0     Estimated Creatinine Clearance: 214.1 mL/min (by C-G formula based on SCr of 0.79 mg/dL).  Results from last 7 days   Lab Units 08/30/20  0604 08/27/20  0316 08/26/20  0720   MAGNESIUM mg/dL 1.9 1.9 1.9   PHOSPHORUS mg/dL 4.8*  --   --          Results from last 7 days   Lab Units 09/01/20  0607 08/31/20  0521  08/30/20  0604 08/29/20  1836   WBC 10*3/mm3 8.19 9.11 8.14 9.51   HEMOGLOBIN g/dL 13.4 13.4 13.0 14.4   HEMATOCRIT % 41.0 40.0 39.6 42.0   PLATELETS 10*3/mm3 203 209 206 209           Culture Data:   No results found for: BLOODCX  No results found for: URINECX  No results found for: RESPCX  No results found for: WOUNDCX  No results found for: STOOLCX  No components found for: BODYFLD    Radiology Data:   Imaging Results (Last 24 Hours)     ** No results found for the last 24 hours. **          I have reviewed the patient's current medications.     Assessment/Plan     Active Hospital Problems    Diagnosis POA   • Chest pain with high risk of acute coronary syndrome [R07.9] Yes   ]  Plan:    1.  Chest pain, rule out ACS: Serial cardiac enzymes are unremarkable.  EKG shows a normal sinus rhythm.  Continue continuous telemetry.  Echocardiogram pending.  2-day stress test to be started today risk and benefits were given and the patient wishes to proceed.  2.  CAD/hypertension: Continue home amlodipine, aspirin, hydralazine, Imdur, lisinopril and Ranexa.  3.  History of pulmonary embolism: Continue home Xarelto.  4.  Diabetes mellitus, type II: Continue SSI.  5.  Anxiety/depression: Continue home Zoloft.  6.  Hyperlipidemia: Continue home statin.  7.  Obstructive sleep apnea: Continue home CPAP.      Discharge Planning: I expect patient to be discharged to home in 1-2 days.      This document has been electronically signed by LALA Carvajal on September 1, 2020 17:06

## 2020-09-02 VITALS
OXYGEN SATURATION: 95 % | WEIGHT: 315 LBS | HEIGHT: 71 IN | TEMPERATURE: 98.6 F | BODY MASS INDEX: 44.1 KG/M2 | DIASTOLIC BLOOD PRESSURE: 77 MMHG | RESPIRATION RATE: 18 BRPM | SYSTOLIC BLOOD PRESSURE: 142 MMHG | HEART RATE: 66 BPM

## 2020-09-02 PROBLEM — I25.10 CORONARY ARTERY DISEASE INVOLVING NATIVE CORONARY ARTERY OF NATIVE HEART WITHOUT ANGINA PECTORIS: Status: ACTIVE | Noted: 2020-08-29

## 2020-09-02 LAB
ALBUMIN SERPL-MCNC: 3.5 G/DL (ref 3.5–5.2)
ALBUMIN/GLOB SERPL: 1 G/DL
ALP SERPL-CCNC: 72 U/L (ref 39–117)
ALT SERPL W P-5'-P-CCNC: 25 U/L (ref 1–41)
ANION GAP SERPL CALCULATED.3IONS-SCNC: 11 MMOL/L (ref 5–15)
AST SERPL-CCNC: 22 U/L (ref 1–40)
BASOPHILS # BLD AUTO: 0.06 10*3/MM3 (ref 0–0.2)
BASOPHILS NFR BLD AUTO: 0.7 % (ref 0–1.5)
BILIRUB SERPL-MCNC: 0.4 MG/DL (ref 0–1.2)
BUN SERPL-MCNC: 11 MG/DL (ref 6–20)
BUN/CREAT SERPL: 14.5 (ref 7–25)
CALCIUM SPEC-SCNC: 8.8 MG/DL (ref 8.6–10.5)
CHLORIDE SERPL-SCNC: 99 MMOL/L (ref 98–107)
CO2 SERPL-SCNC: 25 MMOL/L (ref 22–29)
CREAT SERPL-MCNC: 0.76 MG/DL (ref 0.76–1.27)
DEPRECATED RDW RBC AUTO: 43.8 FL (ref 37–54)
EOSINOPHIL # BLD AUTO: 0.36 10*3/MM3 (ref 0–0.4)
EOSINOPHIL NFR BLD AUTO: 4.2 % (ref 0.3–6.2)
ERYTHROCYTE [DISTWIDTH] IN BLOOD BY AUTOMATED COUNT: 14.2 % (ref 12.3–15.4)
GFR SERPL CREATININE-BSD FRML MDRD: 113 ML/MIN/1.73
GLOBULIN UR ELPH-MCNC: 3.4 GM/DL
GLUCOSE BLDC GLUCOMTR-MCNC: 207 MG/DL (ref 70–130)
GLUCOSE BLDC GLUCOMTR-MCNC: 240 MG/DL (ref 70–130)
GLUCOSE SERPL-MCNC: 187 MG/DL (ref 65–99)
HCT VFR BLD AUTO: 41.7 % (ref 37.5–51)
HGB BLD-MCNC: 13.6 G/DL (ref 13–17.7)
IMM GRANULOCYTES # BLD AUTO: 0.28 10*3/MM3 (ref 0–0.05)
IMM GRANULOCYTES NFR BLD AUTO: 3.3 % (ref 0–0.5)
LYMPHOCYTES # BLD AUTO: 1.79 10*3/MM3 (ref 0.7–3.1)
LYMPHOCYTES NFR BLD AUTO: 20.8 % (ref 19.6–45.3)
MCH RBC QN AUTO: 27.9 PG (ref 26.6–33)
MCHC RBC AUTO-ENTMCNC: 32.6 G/DL (ref 31.5–35.7)
MCV RBC AUTO: 85.6 FL (ref 79–97)
MONOCYTES # BLD AUTO: 0.56 10*3/MM3 (ref 0.1–0.9)
MONOCYTES NFR BLD AUTO: 6.5 % (ref 5–12)
NEUTROPHILS NFR BLD AUTO: 5.54 10*3/MM3 (ref 1.7–7)
NEUTROPHILS NFR BLD AUTO: 64.5 % (ref 42.7–76)
NRBC BLD AUTO-RTO: 0 /100 WBC (ref 0–0.2)
PLATELET # BLD AUTO: 200 10*3/MM3 (ref 140–450)
PMV BLD AUTO: 9.5 FL (ref 6–12)
POTASSIUM SERPL-SCNC: 3.9 MMOL/L (ref 3.5–5.2)
PROT SERPL-MCNC: 6.9 G/DL (ref 6–8.5)
RBC # BLD AUTO: 4.87 10*6/MM3 (ref 4.14–5.8)
SARS-COV-2 N GENE RESP QL NAA+PROBE: NOT DETECTED
SODIUM SERPL-SCNC: 135 MMOL/L (ref 136–145)
WBC # BLD AUTO: 8.59 10*3/MM3 (ref 3.4–10.8)

## 2020-09-02 PROCEDURE — 82962 GLUCOSE BLOOD TEST: CPT

## 2020-09-02 PROCEDURE — 63710000001 HYDRALAZINE 25 MG TABLET: Performed by: INTERNAL MEDICINE

## 2020-09-02 PROCEDURE — 99215 OFFICE O/P EST HI 40 MIN: CPT | Performed by: INTERNAL MEDICINE

## 2020-09-02 PROCEDURE — G0378 HOSPITAL OBSERVATION PER HR: HCPCS

## 2020-09-02 PROCEDURE — A9270 NON-COVERED ITEM OR SERVICE: HCPCS | Performed by: INTERNAL MEDICINE

## 2020-09-02 PROCEDURE — 93458 L HRT ARTERY/VENTRICLE ANGIO: CPT | Performed by: INTERNAL MEDICINE

## 2020-09-02 PROCEDURE — C1894 INTRO/SHEATH, NON-LASER: HCPCS | Performed by: INTERNAL MEDICINE

## 2020-09-02 PROCEDURE — 63710000001 INSULIN ASPART PER 5 UNITS: Performed by: NURSE PRACTITIONER

## 2020-09-02 PROCEDURE — 94799 UNLISTED PULMONARY SVC/PX: CPT

## 2020-09-02 PROCEDURE — 25010000002 HEPARIN (PORCINE) PER 1000 UNITS: Performed by: INTERNAL MEDICINE

## 2020-09-02 PROCEDURE — 0 IOPAMIDOL PER 1 ML: Performed by: INTERNAL MEDICINE

## 2020-09-02 PROCEDURE — 85025 COMPLETE CBC W/AUTO DIFF WBC: CPT | Performed by: NURSE PRACTITIONER

## 2020-09-02 PROCEDURE — 25010000002 MIDAZOLAM PER 1 MG: Performed by: INTERNAL MEDICINE

## 2020-09-02 PROCEDURE — 25010000002 FENTANYL CITRATE (PF) 100 MCG/2ML SOLUTION: Performed by: INTERNAL MEDICINE

## 2020-09-02 PROCEDURE — C1769 GUIDE WIRE: HCPCS | Performed by: INTERNAL MEDICINE

## 2020-09-02 PROCEDURE — 80053 COMPREHEN METABOLIC PANEL: CPT | Performed by: NURSE PRACTITIONER

## 2020-09-02 PROCEDURE — 63710000001 INSULIN ASPART PER 5 UNITS: Performed by: INTERNAL MEDICINE

## 2020-09-02 RX ORDER — HEPARIN SODIUM 1000 [USP'U]/ML
INJECTION, SOLUTION INTRAVENOUS; SUBCUTANEOUS AS NEEDED
Status: DISCONTINUED | OUTPATIENT
Start: 2020-09-02 | End: 2020-09-02 | Stop reason: HOSPADM

## 2020-09-02 RX ORDER — SODIUM CHLORIDE 9 MG/ML
1 INJECTION, SOLUTION INTRAVENOUS CONTINUOUS
Status: DISCONTINUED | OUTPATIENT
Start: 2020-09-02 | End: 2020-09-02 | Stop reason: HOSPADM

## 2020-09-02 RX ORDER — LIDOCAINE HYDROCHLORIDE 20 MG/ML
INJECTION, SOLUTION INFILTRATION; PERINEURAL AS NEEDED
Status: DISCONTINUED | OUTPATIENT
Start: 2020-09-02 | End: 2020-09-02 | Stop reason: HOSPADM

## 2020-09-02 RX ORDER — MIDAZOLAM HYDROCHLORIDE 1 MG/ML
INJECTION INTRAMUSCULAR; INTRAVENOUS AS NEEDED
Status: DISCONTINUED | OUTPATIENT
Start: 2020-09-02 | End: 2020-09-02 | Stop reason: HOSPADM

## 2020-09-02 RX ORDER — NITROGLYCERIN 5 MG/ML
INJECTION, SOLUTION INTRAVENOUS AS NEEDED
Status: DISCONTINUED | OUTPATIENT
Start: 2020-09-02 | End: 2020-09-02 | Stop reason: HOSPADM

## 2020-09-02 RX ORDER — FENTANYL CITRATE 50 UG/ML
INJECTION, SOLUTION INTRAMUSCULAR; INTRAVENOUS AS NEEDED
Status: DISCONTINUED | OUTPATIENT
Start: 2020-09-02 | End: 2020-09-02 | Stop reason: HOSPADM

## 2020-09-02 RX ORDER — ASPIRIN 81 MG/1
324 TABLET, CHEWABLE ORAL ONCE
Status: COMPLETED | OUTPATIENT
Start: 2020-09-02 | End: 2020-09-02

## 2020-09-02 RX ADMIN — BUDESONIDE AND FORMOTEROL FUMARATE DIHYDRATE 2 PUFF: 160; 4.5 AEROSOL RESPIRATORY (INHALATION) at 08:06

## 2020-09-02 RX ADMIN — Medication 2.5 MG: at 13:13

## 2020-09-02 RX ADMIN — ISOSORBIDE MONONITRATE 120 MG: 60 TABLET, EXTENDED RELEASE ORAL at 08:20

## 2020-09-02 RX ADMIN — NITROGLYCERIN 1 INCH: 20 OINTMENT TOPICAL at 05:14

## 2020-09-02 RX ADMIN — INSULIN ASPART 5 UNITS: 100 INJECTION, SOLUTION INTRAVENOUS; SUBCUTANEOUS at 17:09

## 2020-09-02 RX ADMIN — ASPIRIN 324 MG: 81 TABLET, CHEWABLE ORAL at 13:13

## 2020-09-02 RX ADMIN — INSULIN ASPART 1.5 UNITS: 100 INJECTION, SOLUTION INTRAVENOUS; SUBCUTANEOUS at 08:21

## 2020-09-02 RX ADMIN — HYDRALAZINE HYDROCHLORIDE 25 MG: 25 TABLET, FILM COATED ORAL at 08:21

## 2020-09-02 RX ADMIN — RANOLAZINE 1000 MG: 500 TABLET, FILM COATED, EXTENDED RELEASE ORAL at 08:20

## 2020-09-02 RX ADMIN — HYDRALAZINE HYDROCHLORIDE 25 MG: 25 TABLET, FILM COATED ORAL at 16:27

## 2020-09-02 RX ADMIN — SODIUM CHLORIDE, PRESERVATIVE FREE 10 ML: 5 INJECTION INTRAVENOUS at 08:22

## 2020-09-02 RX ADMIN — ATORVASTATIN CALCIUM 80 MG: 40 TABLET, FILM COATED ORAL at 08:21

## 2020-09-02 NOTE — CONSULTS
[facility] Cardiology Consult    Referring Provider: Alan Root, *    Reason for Consultation: Abnormal stress test/CP    Patient Care Team:  Ruslan Gonzalez APRN as PCP - General (Family Medicine)  Gonsalo Hogue MD as Consulting Physician (Hospitalist)    Chief complaint   Chief Complaint   Patient presents with   • Chest Pain       Subjective .     History of present illness:       Yordy Hansen is a 39-year-old  male with past medical history of CAD (July 2016 PCI to LAD2.5 x 16mm at [facility] , March 2017 heart cath showed instent restenosis to LAD successful PCI 2.5 x 18 mm xience alpine stent was deployed successfully; 07/10/2019 90% distal edge InStent stenosis status post PCI and stenting using 3.0 x 13mm Orsiro SOPHIA);  type 2 diabetes, factor II deficiency, fatty liver, GERD, hyper tension, hyperlipidemia, morbid obesity, PE (managed with NOAC of xarelto), sleep apnea who presented to ER with complaints of chest pain.    Of note he was recently discharged from an outside hospital 2 days ago records state he would be set up for an outpatient heart cath due to his weight.  Patient is not aware of heart cath being scheduled.  He normally follows with Dr. Briceno at [facility] cardiology.  He reports substernal chest pain radiating to left arm described as pressure or intermittently x2 weeks.  At the time of my exam patient rated chest pain 2 out of 10 using numeric scale.  Denies worsening or relieving factors.  States chest pain occurs daily, will last around 20 to 30 minutes.  Associated symptoms of nausea and some lightheadedness.  Patient denies other associated symptoms.  For his CAD he is managed with antianginal Imdur 120 mg, Ranexa 1000 mg twice daily, atorvastatin 80 mg.  For his hypertension he has managed with amlodipine 10 mg, hydralazine 25 mg 3 times daily, lisinopril 40 mg nightly, bystolic 5mg BID.  At home he is managed with dual antiplatelet therapy with aspirin 81 mg,  Effient 10 mg daily.    Cardiac enzymes shows normal sinus rhythm.  No events reviewed on telemetry.  Cardiac enzymes x4-, proBNP negative.  D-dimer within normal limits.  Kidney function CBC unremarkable.  Chest x-ray showing minimal interstitial disease or edema.  2-day nuclear stress test showing small fixed apical defects, small to moderate size area of anterolateral ischemia.  Intermediate to high risk study.  Echocardiogram showing normal biventricular function.       Risk factors: arteriosclerotic heart disease, diabetes mellitus, hypercholesterolemia, hypertension, Sedentary life style and Obesity      The CVD Risk score (NADEEN'Agostino, et al., 2008) failed to calculate for the following reasons:    The patient has a prior MI, stroke, CHF, or peripheral vascular disease diagnosis      Review of Systems  Review of Systems   Constitutional: Negative for chills, fatigue and fever.   HENT: Negative for congestion and tinnitus.    Eyes: Negative for photophobia and pain.   Respiratory: Positive for apnea (hx of ADOLFO, cpap). Negative for cough.    Cardiovascular: Positive for chest pain. Negative for palpitations and leg swelling.   Gastrointestinal: Negative for abdominal distention and abdominal pain.   Endocrine: Negative for polydipsia, polyphagia and polyuria.   Genitourinary: Negative for dysuria and hematuria.   Musculoskeletal: Negative for arthralgias and back pain.   Skin: Negative for pallor and rash.   Allergic/Immunologic: Negative for environmental allergies and food allergies.   Neurological: Positive for light-headedness. Negative for syncope, weakness and headaches.   Hematological: Negative for adenopathy. Does not bruise/bleed easily.   Psychiatric/Behavioral: Negative for agitation and confusion.       History  Past Medical History:   Diagnosis Date   • Asthma    • Chest pain    • Chronic back pain    • Coronary artery disease    • Diabetes mellitus (CMS/[facility])     10.6% was last A1C per patient   •  Factor II deficiency (CMS/HCC)    • Fatty liver    • GERD (gastroesophageal reflux disease)    • Hyperlipidemia    • Hypertension    • Morbid obesity (CMS/HCC)    • Pulmonary embolism (CMS/HCC)    • RLS (restless legs syndrome)    • Seizures (CMS/HCC)     last one many years ago   • Sleep apnea     c-pap   ,   Past Surgical History:   Procedure Laterality Date   • ADENOIDECTOMY     • CARDIAC CATHETERIZATION N/A 3/15/2017    Procedure: Left Heart Cath;  Surgeon: Edwige Briceno MD;  Location: [facility] CATH INVASIVE LOCATION;  Service:    • CARDIAC CATHETERIZATION N/A 3/15/2017    Procedure: Stent SOPHIA coronary;  Surgeon: Edwige Briceno MD;  Location: [facility] CATH INVASIVE LOCATION;  Service:    • CARDIAC CATHETERIZATION N/A 3/1/2018    Procedure: Left Heart Cath;  Surgeon: Conor Parsons MD;  Location: [facility] CATH INVASIVE LOCATION;  Service:    • CHOLECYSTECTOMY      lap   • CORONARY ANGIOPLASTY WITH STENT PLACEMENT     • MO RT/LT HEART CATHETERS N/A 3/15/2017    Procedure: Percutaneous Coronary Intervention;  Surgeon: Edwige Briceno MD;  Location: [facility] CATH INVASIVE LOCATION;  Service: Cardiovascular   • TONSILLECTOMY     • TRANSESOPHAGEAL ECHOCARDIOGRAM (MONICA)     ,   Family History   Problem Relation Age of Onset   • Heart disease Mother    • Hypertension Mother    • Hyperlipidemia Mother    • Coronary artery disease Mother    • Diabetes Mother    • Obesity Mother    • Kidney failure Mother    • Sleep apnea Father    • Cancer Paternal Grandfather    ,   Social History     Tobacco Use   • Smoking status: Former Smoker     Packs/day: 0.25     Years: 0.50     Pack years: 0.12     Types: Cigarettes     Last attempt to quit:      Years since quittin.6   • Smokeless tobacco: Current User     Types: Snuff   • Tobacco comment: quit smoking 25 years ago; he does use snuff   Substance Use Topics   • Alcohol use: No   • Drug use: No   ,   Medications Prior to Admission   Medication Sig Dispense Refill Last Dose   •  albuterol (ACCUNEB) 1.25 MG/3ML nebulizer solution Take 3 mL by nebulization Every 6 (Six) Hours As Needed for Wheezing. 100 vial 0 Past Week at Unknown time   • albuterol (PROVENTIL HFA;VENTOLIN HFA) 108 (90 BASE) MCG/ACT inhaler Inhale 2 puffs Every 4 (Four) Hours As Needed for Wheezing.   Past Week at Unknown time   • amLODIPine (NORVASC) 10 MG tablet Take 1 tablet by mouth Every Night. 30 tablet 5 8/29/2020 at Unknown time   • aspirin 81 MG chewable tablet Chew 81 mg Daily.      • atorvastatin (LIPITOR) 80 MG tablet Take 80 mg by mouth.   8/28/2020 at Unknown time   • fenofibrate micronized (LOFIBRA) 134 MG capsule TAKE 1 CAPSULE BY MOUTH WITH FOOD EVERY DAY FOR ELEVATED TRYGLYCERIDES  4 8/29/2020 at Unknown time   • gabapentin (NEURONTIN) 300 MG capsule Take 300 mg by mouth.   8/29/2020 at Unknown time   • insulin glargine (LANTUS) 100 UNIT/ML injection Inject 60 Units under the skin into the appropriate area as directed Every Night.      • isosorbide mononitrate (IMDUR) 120 MG 24 hr tablet Take 1 tablet by mouth Daily. (Patient taking differently: Take 120 mg by mouth Every Night.) 30 tablet 5 8/28/2020 at Unknown time   • lisinopril (PRINIVIL,ZESTRIL) 40 MG tablet Take 1 tablet by mouth Every Morning. (Patient taking differently: Take 40 mg by mouth Every Night.) 30 tablet 5 8/29/2020 at Unknown time   • metoprolol succinate XL (TOPROL-XL) 50 MG 24 hr tablet Take 50 mg by mouth Daily.  11 8/29/2020 at Unknown time   • nitroglycerin (NITROSTAT) 0.4 MG SL tablet Place 1 tablet under the tongue Every 5 (Five) Minutes As Needed for Chest Pain. Take no more than 3 doses in 15 minutes. 100 tablet 12 8/29/2020 at Unknown time   • pantoprazole (PROTONIX) 40 MG EC tablet Take 1 tablet by mouth Every Night. 30 tablet 5 8/29/2020 at Unknown time   • pramipexole (MIRAPEX) 1 MG tablet TAKE TWO TABLETS BY MOUTH EVERY NIGHT 60 tablet 5 8/28/2020 at Unknown time   • prasugrel (EFFIENT) 10 MG tablet Take 10 mg by mouth Daily.    8/29/2020 at Unknown time   • ranolazine (RANEXA) 1000 MG 12 hr tablet Take 1 tablet by mouth Every 12 (Twelve) Hours. 60 tablet 5 8/29/2020 at Unknown time   • rivaroxaban (XARELTO) 20 MG tablet Take 1 tablet by mouth Daily. (Patient taking differently: Take 20 mg by mouth Daily With Dinner.) 30 tablet 4 8/29/2020 at Unknown time   • sertraline (ZOLOFT) 25 MG tablet Take 1 tablet by mouth Daily. (Patient taking differently: Take 25 mg by mouth Every Night.) 30 tablet 5 8/29/2020 at Unknown time   • traZODone (DESYREL) 300 MG tablet TAKE ONE TABLET BY MOUTH EVERY NIGHT 30 tablet 5 8/28/2020 at Unknown time   • budesonide-formoterol (SYMBICORT) 160-4.5 MCG/ACT inhaler Inhale 2 puffs 2 (Two) Times a Day. 6 g 12 Unknown at Unknown time   • BYSTOLIC 10 MG tablet 5 mg 2 (Two) Times a Day.   4/9/2019 at 0900   • cyclobenzaprine (FLEXERIL) 10 MG tablet Take 1 tablet by mouth 3 (Three) Times a Day As Needed for Muscle Spasms. 12 tablet 0    • diclofenac (VOLTAREN) 1 % gel gel   0    • dicyclomine (BENTYL) 20 MG tablet Take 1 tablet by mouth 4 (Four) Times a Day As Needed (abdominal pain). 6 tablet 0    • fenofibrate (TRICOR) 145 MG tablet Take 145 mg by mouth Daily.   4/9/2019 at 0900   • glucose monitor monitoring kit 1 each As Needed (check blood glucose 3x daily). 1 each 0 2/19/2019 at Unknown time   • hydrALAZINE (APRESOLINE) 25 MG tablet Take 25 mg by mouth 3 (Three) Times a Day.      • insulin aspart (novoLOG) 100 UNIT/ML injection Inject 25 Units under the skin into the appropriate area as directed 3 (Three) Times a Day Before Meals. (Patient taking differently: Inject 10 Units under the skin into the appropriate area as directed 3 (Three) Times a Day Before Meals.) 10 mL 3 Unknown at Unknown time   • insulin degludec (TRESIBA FLEXTOUCH) 100 UNIT/ML solution pen-injector injection Inject 75 Units under the skin into the appropriate area as directed Every Night.   Unknown at Unknown time   • loperamide (IMODIUM) 2  "MG capsule Take 1 capsule by mouth 4 (Four) Times a Day As Needed for Diarrhea. 8 capsule 0    • methocarbamol (ROBAXIN) 500 MG tablet Take 1 tablet by mouth 4 (Four) Times a Day. 56 tablet 0    • MICROLET LANCETS misc One touch delica lancets 100 each 5 Unknown at Unknown time   • NYSTATIN 226383 UNIT/GM powder APPLY POWDER TO AFFECTED AREA THREE TIMES A DAY FOR INTERTRIGO  0    • RELION INSULIN SYRINGE 31G X 15/64\" 0.5 ML misc    4/10/2019 at Unknown time      ALLERGIES  Glipizide; Reglan [metoclopramide]; Tramadol; and Risperidone    Objective     Vital Sign Min/Max for last 24 hours  Temp  Min: 97.3 °F (36.3 °C)  Max: 98.7 °F (37.1 °C)   BP  Min: 116/53  Max: 147/63   Pulse  Min: 61  Max: 87   Resp  Min: 16  Max: 18   SpO2  Min: 92 %  Max: 96 %   No data recorded   Weight  Min: 198 kg (436 lb 12.8 oz)  Max: 198 kg (436 lb 12.8 oz)     Flowsheet Rows      First Filed Value   Admission Height  180.3 cm (71\") Documented at 08/29/2020 2025   Admission Weight  (!) 192 kg (423 lb 14.4 oz) Documented at 08/29/2020 2025             Physical Exam:  Physical Exam   Constitutional: He is oriented to person, place, and time. He appears well-developed and well-nourished. No distress.   Morbid obesity   HENT:   Head: Normocephalic and atraumatic.   Right Ear: External ear normal.   Left Ear: External ear normal.   Eyes: Pupils are equal, round, and reactive to light. Conjunctivae and EOM are normal.   Neck: Neck supple. No JVD present.   Cardiovascular: Normal rate, regular rhythm, S1 normal, S2 normal, normal heart sounds and intact distal pulses. PMI is not displaced. Exam reveals no gallop, no S3, no S4 and no friction rub.   No murmur heard.  Pulmonary/Chest: Effort normal and breath sounds normal. No stridor. No respiratory distress. He exhibits no tenderness.   Abdominal: Soft. Bowel sounds are normal. He exhibits no distension. There is no tenderness.   Musculoskeletal: Normal range of motion. He exhibits no edema or " deformity.   Neurological: He is alert and oriented to person, place, and time.   Skin: Skin is warm and dry. Capillary refill takes 2 to 3 seconds. No rash noted. He is not diaphoretic. No erythema. No pallor.   Psychiatric: He has a normal mood and affect. His behavior is normal. Judgment and thought content normal.   Nursing note and vitals reviewed.         Results Review:     Results from last 7 days   Lab Units 09/02/20  0733 09/01/20  0607 08/31/20  0521   SODIUM mmol/L 135* 135* 134*   POTASSIUM mmol/L 3.9 4.0 4.3   CHLORIDE mmol/L 99 98 100   CO2 mmol/L 25.0 28.0 23.0   BUN mg/dL 11 8 11   CREATININE mg/dL 0.76 0.79 0.74*   CALCIUM mg/dL 8.8 9.0 9.1   BILIRUBIN mg/dL 0.4 0.5 0.7   ALK PHOS U/L 72 79 78   ALT (SGPT) U/L 25 26 30   AST (SGOT) U/L 22 25 33   GLUCOSE mg/dL 187* 182* 160*       Estimated Creatinine Clearance: 224.3 mL/min (by C-G formula based on SCr of 0.76 mg/dL).    Results from last 7 days   Lab Units 08/30/20  0604 08/27/20  0316   MAGNESIUM mg/dL 1.9 1.9   PHOSPHORUS mg/dL 4.8*  --        Results from last 7 days   Lab Units 09/02/20  0530 09/01/20  0607 08/31/20  0521 08/30/20  0604 08/29/20  1836   WBC 10*3/mm3 8.59 8.19 9.11 8.14 9.51   HEMOGLOBIN g/dL 13.6 13.4 13.4 13.0 14.4   PLATELETS 10*3/mm3 200 203 209 206 209             Lab Results   Component Value Date    CKTOTAL 163 11/03/2018    CKMB 150 05/16/2020    CKMBINDEX 0.61 04/01/2020    TROPONINI <0.02 08/25/2020    TROPONINT <0.010 08/30/2020     Lab Results   Component Value Date    PROBNP 29.7 08/29/2020       I/O last 3 completed shifts:  In: 2070 [P.O.:120; I.V.:1950]  Out: -     Cardiographics  ECG/EMG Results (last 24 hours)     Procedure Component Value Units Date/Time    SCANNED EKG [[id number]] Resulted:  08/29/20      Updated:  09/01/20 1415        Results for orders placed during the hospital encounter of 08/29/20   Adult Transthoracic Echo Complete W/ Cont if Necessary Per Protocol    Narrative · Normal EF 51-55%.             Xr Chest 1 View    Result Date: 8/29/2020  Minimal interstitial prominence and indistinctness, could represent interstitial edema, infectious or inflammatory process. Clinical correlation needed Electronically signed by:  Gela Shaffer MD  8/29/2020 6:40 PM CDT Workstation: [id number]      Intake/Output       09/01/20 0700 - 09/02/20 0659 09/02/20 0700 - 09/03/20 0659    Intake (ml) 1120 480    Output (ml) -- --    Net (ml) 1120 480    Last Weight  (!) 198 kg (436 lb 12.8 oz)  --            Assessment/Plan       Chest pain with high risk of acute coronary syndrome    Coronary artery disease involving native coronary artery of native heart without angina pectoris    Chest pain/ hx of CAD with multiple PCI as described above to LAD.   The patient's CP is typical. The patient's EKG is Normal. Troponin x4 negative. Patient is still having intermittent chest pain.  The patient is hemodynamically stable.      mg now  -Xarelto has been held x24 hrs.  Start high-dose Atorvastatin 80mg  Continue antianginal therapy with Ranexa and Imdur.  BB with [facility]  BENITO  Nitropatch 1in q6h  Will plan on [facility] this evening with Dr. Sultan Landon pt. NPO after breakfast    The indications, risks and benefits of diagnostic left heart cardiac catheterization, angiography, conscious sedation, and possible blood transfusion were discussed in detail with the patient. The potential complications of 1/2000 chance of death, 1/1000 chance of heart attack or stroke, 1/500 chance of bleeding or clotting of the femoral artery, and 1/500 chance of allergic reaction to contrast were discussed. We also reviewed possible complications of infection and kidney dysfunction. If PCI were performed and intra-coronary stents indicated, we discussed the details about  SOPHIA. This included a review of the risks of the infrequent, but relatively higher incidence of late thrombosis with SOPHIA. The importance of maintaining a consistent daily regimen of  aspirin and an additional anti-platelet agent  for as long as directed after implantation was emphasized. No contraindications were found.  The patient  appeared to understand and agree to the above.  C with Dr. Tejada.  stop metformin 48 hours prior to surgery and restart 48 hours after surgery and use 1/2 the usual dose of long acting insulin the morning of surgery  -We will proceed with ASA 3, mallampati 2  -Patient has no dye allergy and has tolerated procedure well in the past.     HTN: Amlodipine, hydralazine, Bystolic, lisinopril    History of PE: Xarelto on hold currently planning for heart cath    Type 2 diabetes: Continue sliding scale insulin    Hyperlipidemia: Continue atorvastatin    ADOLFO: Patient has compliance with CPAP machine    Anxiety and depression: Managed with Zoloft    Yordy Hansen  reports that he quit smoking about 23 years ago. His smoking use included cigarettes. He has a 0.13 pack-year smoking history. His smokeless tobacco use includes snuff.. I have educated him on the risk of diseases from using tobacco products such as cancer, COPD and heart diease.         I discussed the patients findings and my recommendations with patient and nursing staff and Dr. Tejada.                This document has been electronically signed by LALA Conway on September 2, 2020 10:43

## 2020-09-02 NOTE — PLAN OF CARE
Problem: Patient Care Overview  Goal: Plan of Care Review  Outcome: Ongoing (interventions implemented as appropriate)  Flowsheets  Taken 9/1/2020 0242 by Gm Suggs, RN  Progress: no change  Taken 9/1/2020 2035 by Garima Kennedy, RN  Plan of Care Reviewed With: patient  Taken 9/2/2020 0033 by Garima Kennedy, RN  Outcome Summary: Pt resting at this time.  NPO at midnight.  No s/s of pain or discomfort at this time.  VSS.  Will continue to monitor.

## 2020-09-02 NOTE — DISCHARGE SUMMARY
[facility] Medicine Services  DISCHARGE SUMMARY       Date of Admission: 8/29/2020  Date of Discharge:  9/2/2020  Primary Care Physician: Ruslan Gonzalez APRN    Presenting Problem/History of Present Illness:  Chest pain with high risk of acute coronary syndrome [R07.9]     Final Discharge Diagnoses:  Active Hospital Problems    Diagnosis   • Chest pain with high risk of acute coronary syndrome   • Coronary artery disease involving native coronary artery of native heart without angina pectoris     Added automatically from request for surgery [id number]         Consults:   Consults     Date and Time Order Name Status Description    9/1/2020 1646 Inpatient Cardiology Consult Completed     8/29/2020 2017 Hospitalist (on-call MD unless specified)            Procedures Performed: Procedure(s):  Left Heart Cath            Pertinent Test Results:     Lab Results (last 24 hours)     Procedure Component Value Units Date/Time    POC Glucose Once [[id number]]  (Abnormal) Collected:  09/02/20 1611    Specimen:  Blood Updated:  09/02/20 1641     Glucose 207 mg/dL      Comment: RN NotifiedOperator: [id number] QUYNH NIEVESCredit BenchmarkMeter ID: [id number]       POC Glucose Once [[id number]]  (Abnormal) Collected:  09/02/20 1035    Specimen:  Blood Updated:  09/02/20 1055     Glucose 240 mg/dL      Comment: RN NotifiedOperator: [id number] QUYNH NIEVESCredit BenchmarkMeter ID: [id number]       Comprehensive Metabolic Panel [[id number]]  (Abnormal) Collected:  09/02/20 0733    Specimen:  Blood Updated:  09/02/20 0801     Glucose 187 mg/dL      BUN 11 mg/dL      Creatinine 0.76 mg/dL      Sodium 135 mmol/L      Potassium 3.9 mmol/L      Chloride 99 mmol/L      CO2 25.0 mmol/L      Calcium 8.8 mg/dL      Total Protein 6.9 g/dL      Albumin 3.50 g/dL      ALT (SGPT) 25 U/L      AST (SGOT) 22 U/L      Alkaline Phosphatase 72 U/L      Total Bilirubin 0.4 mg/dL      eGFR Non African Amer 113 mL/min/1.73      Globulin 3.4  gm/dL      A/G Ratio 1.0 g/dL      BUN/Creatinine Ratio 14.5     Anion Gap 11.0 mmol/L     Narrative:       GFR Normal >60  Chronic Kidney Disease <60  Kidney Failure <15      CBC & Differential [[id number]] Collected:  09/02/20 0530    Specimen:  Blood Updated:  09/02/20 0602    Narrative:       The following orders were created for panel order CBC & Differential.  Procedure                               Abnormality         Status                     ---------                               -----------         ------                     CBC Auto Differential[[id number]]        Abnormal            Final result                 Please view results for these tests on the individual orders.    CBC Auto Differential [[id number]]  (Abnormal) Collected:  09/02/20 0530    Specimen:  Blood Updated:  09/02/20 0602     WBC 8.59 10*3/mm3      RBC 4.87 10*6/mm3      Hemoglobin 13.6 g/dL      Hematocrit 41.7 %      MCV 85.6 fL      MCH 27.9 pg      MCHC 32.6 g/dL      RDW 14.2 %      RDW-SD 43.8 fl      MPV 9.5 fL      Platelets 200 10*3/mm3      Neutrophil % 64.5 %      Lymphocyte % 20.8 %      Monocyte % 6.5 %      Eosinophil % 4.2 %      Basophil % 0.7 %      Immature Grans % 3.3 %      Neutrophils, Absolute 5.54 10*3/mm3      Lymphocytes, Absolute 1.79 10*3/mm3      Monocytes, Absolute 0.56 10*3/mm3      Eosinophils, Absolute 0.36 10*3/mm3      Basophils, Absolute 0.06 10*3/mm3      Immature Grans, Absolute 0.28 10*3/mm3      nRBC 0.0 /100 WBC     COVID PRE-OP / PRE-PROCEDURE SCREENING ORDER (NO ISOLATION) - Swab, Nasopharynx [[id number]] Collected:  09/01/20 2037    Specimen:  Swab from Nasopharynx Updated:  09/02/20 0221    Narrative:       The following orders were created for panel order COVID PRE-OP / PRE-PROCEDURE SCREENING ORDER (NO ISOLATION) - Swab, Nasopharynx.  Procedure                               Abnormality         Status                     ---------                               -----------         ------                      COVID-19, ISMAEL CR IN-HOUS...[[id number]]  Normal              Final result                 Please view results for these tests on the individual orders.    COVID-19, ISMAEL CR IN-HOUSE, NP SWAB IN TRANSPORT MEDIA 8-10 HR TAT - Swab, Nasopharynx [[id number]]  (Normal) Collected:  09/01/20 2037    Specimen:  Swab from Nasopharynx Updated:  09/02/20 0221     COVID19 Not Detected    Narrative:       Testing performed by Real Time RT-PCR  This test has not been approved by the [facility] but is authorized under the Emergency Use Act (EUA)    Fact sheet for providers: https://www.fda.gov/media/595728/download    Fact sheet for patients: https://www.fda.gov/media/060021/download        POC Glucose Once [[id number]]  (Abnormal) Collected:  09/01/20 1603    Specimen:  Blood Updated:  09/01/20 2234     Glucose 182 mg/dL      Comment: RN NotifiedOperator: [id number] CAROLYNE ABERNATHYYMeter ID: [id number]       POC Glucose Once [[id number]]  (Abnormal) Collected:  09/01/20 1020    Specimen:  Blood Updated:  09/01/20 2233     Glucose 260 mg/dL      Comment: RN NotifiedOperator: [id number] CAROLYNE My1loginYMeter ID: [id number]           Imaging Results (Last 24 Hours)     ** No results found for the last 24 hours. **          Chief Complaint on Day of Discharge: No complaints    Hospital Course:    This is a 39-year-old  male with past medical history of CAD (multiple stents), DM 2, factor II deficiency, fatty liver, GERD, HLD, HTN, morbid obesity, pulmonary embolism and sleep apnea that presented to [facility] on 8/29/2020 with complaints of chest pain.  Patient was recently discharged from an outside hospital 2 days ago and records state he is to be set up for an outpatient heart cath due to his weight.  Patient states he is not aware of a heart cath being scheduled.  He follows with Dr. Briceno for cardiology in [facility].  Patient also reports some lightheadedness.  He states nothing is made the pain worse or  "better and describes as it feeling like pressure in the center of his chest. The patient underwent a two-day stress test that indicated a small to moderate sized area of anterolateral ischemia.  The patient underwent heart cath by Dr. Tejada on 9/2/2020, which indicated no significant stenosis.  The patient will continue his current home medications.  Xarelto will be resumed on 9/3/2020.  Patient states he would like to follow with [facility].  Referral will be sent.       Condition on Discharge:  Stable    Physical Exam on Discharge:  /77 (BP Location: Left arm, Patient Position: Sitting)   Pulse 66   Temp 98.6 °F (37 °C) (Temporal)   Resp 18   Ht 180.3 cm (71\")   Wt (!) 198 kg (436 lb 12.8 oz)   SpO2 95%   BMI 60.92 kg/m²   Physical Exam   Constitutional: He is oriented to person, place, and time. He appears well-developed and well-nourished.   HENT:   Head: Normocephalic and atraumatic.   Eyes: Pupils are equal, round, and reactive to light. EOM are normal.   Neck: Normal range of motion. Neck supple.   Cardiovascular: Normal rate and regular rhythm.   Pulmonary/Chest: Effort normal and breath sounds normal.   Abdominal: Soft. Bowel sounds are normal.   Musculoskeletal: Normal range of motion.   Neurological: He is alert and oriented to person, place, and time.   Skin: Skin is warm and dry.   Psychiatric: He has a normal mood and affect. His behavior is normal.     Discharge Disposition:  Home or Self Care    Discharge Medications:     Discharge Medications      Changes to Medications      Instructions Start Date   insulin aspart 100 UNIT/ML injection  Commonly known as:  novoLOG  What changed:  how much to take   25 Units, Subcutaneous, 3 Times Daily Before Meals      isosorbide mononitrate 120 MG 24 hr tablet  Commonly known as:  IMDUR  What changed:  when to take this   120 mg, Oral, Daily      lisinopril 40 MG tablet  Commonly known as:  ELISEO US  What changed:  when to " take this   40 mg, Oral, Every Morning      sertraline 25 MG tablet  Commonly known as:  ZOLOFT  What changed:  when to take this   25 mg, Oral, Daily         Continue These Medications      Instructions Start Date   albuterol sulfate  (90 Base) MCG/ACT inhaler  Commonly known as:  PROVENTIL HFA;VENTOLIN HFA;PROAIR HFA   2 puffs, Inhalation, Every 4 Hours PRN      albuterol 1.25 MG/3ML nebulizer solution  Commonly known as:  ACCUNEB   1.25 mg, Nebulization, Every 6 Hours PRN      amLODIPine 10 MG tablet  Commonly known as:  NORVASC   10 mg, Oral, Nightly      aspirin 81 MG chewable tablet   81 mg, Oral, Daily      atorvastatin 80 MG tablet  Commonly known as:  LIPITOR   80 mg, Oral      budesonide-formoterol 160-4.5 MCG/ACT inhaler  Commonly known as:  SYMBICORT   2 puffs, Inhalation, 2 Times Daily - RT      Bystolic 10 MG tablet  Generic drug:  nebivolol   5 mg, 2 Times Daily      cyclobenzaprine 10 MG tablet  Commonly known as:  FLEXERIL   10 mg, Oral, 3 Times Daily PRN      dicyclomine 20 MG tablet  Commonly known as:  BENTYL   20 mg, Oral, 4 Times Daily PRN      fenofibrate 145 MG tablet  Commonly known as:  TRICOR   145 mg, Oral, Daily      fenofibrate micronized 134 MG capsule  Commonly known as:  LOFIBRA   TAKE 1 CAPSULE BY MOUTH WITH FOOD EVERY DAY FOR ELEVATED TRYGLYCERIDES      gabapentin 300 MG capsule  Commonly known as:  NEURONTIN   300 mg, Oral      glucose monitor monitoring kit   1 each, Does not apply, As Needed      hydrALAZINE 25 MG tablet  Commonly known as:  APRESOLINE   25 mg, Oral, 3 Times Daily      insulin glargine 100 UNIT/ML injection  Commonly known as:  LANTUS   60 Units, Subcutaneous, Nightly      loperamide 2 MG capsule  Commonly known as:  IMODIUM   2 mg, Oral, 4 Times Daily PRN      methocarbamol 500 MG tablet  Commonly known as:  Robaxin   500 mg, Oral, 4 Times Daily      metoprolol succinate XL 50 MG 24 hr tablet  Commonly known as:  TOPROL-XL   50 mg, Oral, Daily     "  Microlet Lancets misc   One touch delica lancets      nitroglycerin 0.4 MG SL tablet  Commonly known as:  NITROSTAT   0.4 mg, Sublingual, Every 5 Minutes PRN, Take no more than 3 doses in 15 minutes.      nystatin 366204 UNIT/GM powder  Generic drug:  nystatin   APPLY POWDER TO AFFECTED AREA THREE TIMES A DAY FOR INTERTRIGO      pantoprazole 40 MG EC tablet  Commonly known as:  PROTONIX   40 mg, Oral, Nightly      pramipexole 1 MG tablet  Commonly known as:  MIRAPEX   TAKE TWO TABLETS BY MOUTH EVERY NIGHT      prasugrel 10 MG tablet  Commonly known as:  EFFIENT   10 mg, Oral, Daily      ranolazine 1000 MG 12 hr tablet  Commonly known as:  RANEXA   1,000 mg, Oral, Every 12 Hours Scheduled      ReliOn Insulin Syringe 31G X 15/64\" 0.5 ML misc  Generic drug:  Insulin Syringe-Needle U-100   No dose, route, or frequency recorded.      rivaroxaban 20 MG tablet  Commonly known as:  XARELTO   20 mg, Oral, Daily With Dinner   Start Date:  September 3, 2020     traZODone 300 MG tablet  Commonly known as:  DESYREL   TAKE ONE TABLET BY MOUTH EVERY NIGHT      Tresiba FlexTouch 100 UNIT/ML solution pen-injector injection  Generic drug:  insulin degludec   75 Units, Subcutaneous, Nightly         Stop These Medications    diclofenac 1 % gel gel  Commonly known as:  VOLTAREN            Discharge Diet:   Diet Instructions     Diet: Cardiac, Consistent Carbohydrate      Discharge Diet:   Cardiac  Consistent Carbohydrate             Activity at Discharge:   Activity Instructions     Activity as Tolerated            Discharge Care Plan/Instructions: As above.     Follow-up Appointment:  Follow-up Information     Edwige Briceno MD Follow up in 1 week(s).    Specialties:  Cardiology, Interventional Cardiology  Why:  Post heart cath.   Contact information:  [address]  [address]  [facility] [address] [phone]             Ruslan Gonzalez APRN Follow up in 1 week(s).    Specialty:  Family Medicine  Why:  post " cath  Contact information:  [address] [place] [address] [phone]                        This document has been electronically signed by LALA Carvajal on September 2, 2020 16:53        Time: Greater than 30 minutes.

## 2020-09-02 NOTE — PROGRESS NOTES
[facility] Medicine Services  INPATIENT PROGRESS NOTE    Length of Stay: 0  Date of Admission: 8/29/2020  Primary Care Physician: Ruslan Gonzalez APRN    Subjective   Chief Complaint: Chest pain    HPI:     9/2/2020: Patient scheduled to undergo coronary angiogram today with Dr. Tejada.  Patient continues to have intermittent chest pain.    9/1/2020: Stress test indicated an intermediate to high study with small to moderate sized area of anterolateral ischemia.  I spoke with Dr. Tejada who is on for interventional cardiology tomorrow.      8/31/2020: Patient is scheduled to undergo a 2-day stress test today.  Has no complaints today.    This is a 39-year-old  male with past medical history of CAD (multiple stents), DM 2, factor II deficiency, fatty liver, GERD, HLD, HTN, morbid obesity, pulmonary embolism and sleep apnea that presented to [facility] on 8/29/2020 with complaints of chest pain.  Patient was recently discharged from an outside hospital 2 days ago and records state he is to be set up for an outpatient heart cath due to his weight.  Patient states he is not aware of a heart cath being scheduled.  He follows with Dr. Briceno for cardiology in [facility].  Patient also reports some lightheadedness.  He states nothing is made the pain worse or better and describes as it feeling like pressure in the center of his chest.  The patient was not having chest pain during evaluation but states it comes and goes about every 3 hours.    Review of Systems   Constitutional: Negative for activity change and fatigue.   HENT: Negative for ear pain and sore throat.    Eyes: Negative for pain and discharge.   Respiratory: Negative for cough and shortness of breath.    Cardiovascular: Positive for chest pain. Negative for palpitations.   Gastrointestinal: Negative for abdominal pain and nausea.   Endocrine: Negative for cold intolerance and heat intolerance.    Genitourinary: Negative for difficulty urinating and dysuria.   Musculoskeletal: Negative for arthralgias and gait problem.   Skin: Negative for color change and rash.   Neurological: Positive for light-headedness. Negative for dizziness and weakness.   Psychiatric/Behavioral: Negative for agitation and confusion.        Objective    Temp:  [97.3 °F (36.3 °C)-98.7 °F (37.1 °C)] 98.7 °F (37.1 °C)  Heart Rate:  [61-87] 72  Resp:  [16-18] 18  BP: (116-147)/(53-79) 134/65    Physical Exam   Constitutional: He is oriented to person, place, and time. He appears well-developed and well-nourished.   HENT:   Head: Normocephalic and atraumatic.   Eyes: Pupils are equal, round, and reactive to light. EOM are normal.   Neck: Normal range of motion. Neck supple.   Cardiovascular: Normal rate and regular rhythm.   Pulmonary/Chest: Effort normal and breath sounds normal.   Abdominal: Soft. Bowel sounds are normal.   Musculoskeletal: Normal range of motion.   Neurological: He is alert and oriented to person, place, and time.   Skin: Skin is warm and dry.   Psychiatric: He has a normal mood and affect. His behavior is normal.     Results Review:  I have reviewed the labs, radiology results, and diagnostic studies.    Laboratory Data:   Results from last 7 days   Lab Units 09/02/20  0733 09/01/20  0607 08/31/20  0521   SODIUM mmol/L 135* 135* 134*   POTASSIUM mmol/L 3.9 4.0 4.3   CHLORIDE mmol/L 99 98 100   CO2 mmol/L 25.0 28.0 23.0   BUN mg/dL 11 8 11   CREATININE mg/dL 0.76 0.79 0.74*   GLUCOSE mg/dL 187* 182* 160*   CALCIUM mg/dL 8.8 9.0 9.1   BILIRUBIN mg/dL 0.4 0.5 0.7   ALK PHOS U/L 72 79 78   ALT (SGPT) U/L 25 26 30   AST (SGOT) U/L 22 25 33   ANION GAP mmol/L 11.0 9.0 11.0     Estimated Creatinine Clearance: 224.3 mL/min (by C-G formula based on SCr of 0.76 mg/dL).  Results from last 7 days   Lab Units 08/30/20  0604 08/27/20  0316   MAGNESIUM mg/dL 1.9 1.9   PHOSPHORUS mg/dL 4.8*  --          Results from last 7 days   Lab  Units 09/02/20  0530 09/01/20  0607 08/31/20  0521 08/30/20  0604 08/29/20  1836   WBC 10*3/mm3 8.59 8.19 9.11 8.14 9.51   HEMOGLOBIN g/dL 13.6 13.4 13.4 13.0 14.4   HEMATOCRIT % 41.7 41.0 40.0 39.6 42.0   PLATELETS 10*3/mm3 200 203 209 206 209           Culture Data:   No results found for: BLOODCX  No results found for: URINECX  No results found for: RESPCX  No results found for: WOUNDCX  No results found for: STOOLCX  No components found for: BODYFLD    Radiology Data:   Imaging Results (Last 24 Hours)     ** No results found for the last 24 hours. **          I have reviewed the patient's current medications.     Assessment/Plan     Active Hospital Problems    Diagnosis POA   • Chest pain with high risk of acute coronary syndrome [R07.9] Yes   • Coronary artery disease involving native coronary artery of native heart without angina pectoris [I25.10] Unknown     Added automatically from request for surgery [id number]     ]  Plan:    1.  Chest pain, rule out ACS: Serial cardiac enzymes are unremarkable.  EKG shows a normal sinus rhythm.  Continue continuous telemetry.  Patient scheduled to undergo coronary angiogram with Dr. Tejada today.  2.  CAD/hypertension: Continue home amlodipine, aspirin, hydralazine, Imdur, lisinopril and Ranexa.  Effient to be restarted after cath.     3.  History of pulmonary embolism: Xarelto held for heart cath.   4.  Diabetes mellitus, type II: Continue SSI.  5.  Anxiety/depression: Continue home Zoloft.  6.  Hyperlipidemia: Continue home statin.  7.  Obstructive sleep apnea: Continue home CPAP.      Discharge Planning: I expect patient to be discharged to home in 1-2 days.      This document has been electronically signed by LALA Carvajal on September 2, 2020 10:27

## 2020-09-04 ENCOUNTER — DOCUMENTATION (OUTPATIENT)
Dept: CARDIAC REHAB | Facility: HOSPITAL | Age: 40
End: 2020-09-04

## 2020-09-04 LAB
GLUCOSE BLDC GLUCOMTR-MCNC: 191 MG/DL (ref 70–130)
GLUCOSE BLDC GLUCOMTR-MCNC: 217 MG/DL (ref 70–130)
GLUCOSE BLDC GLUCOMTR-MCNC: 230 MG/DL (ref 70–130)

## 2020-09-29 ENCOUNTER — OFFICE VISIT (OUTPATIENT)
Dept: FAMILY MEDICINE CLINIC | Facility: CLINIC | Age: 40
End: 2020-09-29

## 2020-09-29 VITALS
WEIGHT: 315 LBS | OXYGEN SATURATION: 95 % | BODY MASS INDEX: 44.1 KG/M2 | DIASTOLIC BLOOD PRESSURE: 82 MMHG | SYSTOLIC BLOOD PRESSURE: 143 MMHG | TEMPERATURE: 98 F | HEART RATE: 77 BPM | RESPIRATION RATE: 18 BRPM | HEIGHT: 71 IN

## 2020-09-29 DIAGNOSIS — K21.9 GASTROESOPHAGEAL REFLUX DISEASE, ESOPHAGITIS PRESENCE NOT SPECIFIED: ICD-10-CM

## 2020-09-29 DIAGNOSIS — G47.33 OSA ON CPAP: ICD-10-CM

## 2020-09-29 DIAGNOSIS — I27.82 OTHER CHRONIC PULMONARY EMBOLISM WITHOUT ACUTE COR PULMONALE (HCC): Chronic | ICD-10-CM

## 2020-09-29 DIAGNOSIS — I10 ESSENTIAL HYPERTENSION: ICD-10-CM

## 2020-09-29 DIAGNOSIS — M62.838 MUSCLE SPASM: ICD-10-CM

## 2020-09-29 DIAGNOSIS — Z79.4 TYPE 2 DIABETES MELLITUS WITH HYPERGLYCEMIA, WITH LONG-TERM CURRENT USE OF INSULIN (HCC): ICD-10-CM

## 2020-09-29 DIAGNOSIS — E78.5 HYPERLIPIDEMIA, UNSPECIFIED HYPERLIPIDEMIA TYPE: ICD-10-CM

## 2020-09-29 DIAGNOSIS — D68.2 FACTOR II DEFICIENCY (HCC): ICD-10-CM

## 2020-09-29 DIAGNOSIS — E66.01 MORBID OBESITY (HCC): ICD-10-CM

## 2020-09-29 DIAGNOSIS — J45.20 MILD INTERMITTENT ASTHMA WITHOUT COMPLICATION: ICD-10-CM

## 2020-09-29 DIAGNOSIS — E11.40 TYPE 2 DIABETES MELLITUS WITH DIABETIC NEUROPATHY, WITH LONG-TERM CURRENT USE OF INSULIN (HCC): Primary | ICD-10-CM

## 2020-09-29 DIAGNOSIS — E11.65 TYPE 2 DIABETES MELLITUS WITH HYPERGLYCEMIA, WITH LONG-TERM CURRENT USE OF INSULIN (HCC): ICD-10-CM

## 2020-09-29 DIAGNOSIS — G47.00 INSOMNIA, UNSPECIFIED TYPE: ICD-10-CM

## 2020-09-29 DIAGNOSIS — Z79.4 TYPE 2 DIABETES MELLITUS WITH DIABETIC NEUROPATHY, WITH LONG-TERM CURRENT USE OF INSULIN (HCC): Primary | ICD-10-CM

## 2020-09-29 DIAGNOSIS — I25.10 CORONARY ARTERY DISEASE INVOLVING NATIVE CORONARY ARTERY OF NATIVE HEART WITHOUT ANGINA PECTORIS: Chronic | ICD-10-CM

## 2020-09-29 DIAGNOSIS — Z99.89 OSA ON CPAP: ICD-10-CM

## 2020-09-29 DIAGNOSIS — G25.81 RLS (RESTLESS LEGS SYNDROME): ICD-10-CM

## 2020-09-29 PROCEDURE — 99214 OFFICE O/P EST MOD 30 MIN: CPT | Performed by: NURSE PRACTITIONER

## 2020-09-29 RX ORDER — LISINOPRIL 40 MG/1
40 TABLET ORAL NIGHTLY
Qty: 90 TABLET | Refills: 3 | Status: SHIPPED | OUTPATIENT
Start: 2020-09-29 | End: 2021-09-30 | Stop reason: SDUPTHER

## 2020-09-29 RX ORDER — ISOSORBIDE MONONITRATE 120 MG/1
120 TABLET, EXTENDED RELEASE ORAL NIGHTLY
Qty: 90 TABLET | Refills: 3 | Status: ON HOLD | OUTPATIENT
Start: 2020-09-29 | End: 2020-10-07

## 2020-09-29 RX ORDER — PRASUGREL 10 MG/1
10 TABLET, FILM COATED ORAL DAILY
Qty: 90 TABLET | Refills: 3 | Status: SHIPPED | OUTPATIENT
Start: 2020-09-29 | End: 2021-09-30 | Stop reason: SDUPTHER

## 2020-09-29 RX ORDER — ATORVASTATIN CALCIUM 80 MG/1
80 TABLET, FILM COATED ORAL NIGHTLY
Qty: 90 TABLET | Refills: 3 | Status: SHIPPED | OUTPATIENT
Start: 2020-09-29 | End: 2021-09-30 | Stop reason: SDUPTHER

## 2020-09-29 RX ORDER — FENOFIBRATE 134 MG/1
134 CAPSULE ORAL
Qty: 90 CAPSULE | Refills: 3 | Status: SHIPPED | OUTPATIENT
Start: 2020-09-29 | End: 2021-09-30 | Stop reason: SDUPTHER

## 2020-09-29 RX ORDER — NITROGLYCERIN 0.4 MG/1
0.4 TABLET SUBLINGUAL
Qty: 100 TABLET | Refills: 5 | Status: ON HOLD | OUTPATIENT
Start: 2020-09-29 | End: 2020-10-07

## 2020-09-29 RX ORDER — GABAPENTIN 300 MG/1
600 CAPSULE ORAL
Qty: 120 CAPSULE | Refills: 0 | Status: SHIPPED | OUTPATIENT
Start: 2020-09-29 | End: 2020-11-05 | Stop reason: SDUPTHER

## 2020-09-29 RX ORDER — PANTOPRAZOLE SODIUM 40 MG/1
40 TABLET, DELAYED RELEASE ORAL NIGHTLY
Qty: 90 TABLET | Refills: 3 | Status: SHIPPED | OUTPATIENT
Start: 2020-09-29

## 2020-09-29 RX ORDER — PRAMIPEXOLE DIHYDROCHLORIDE 1 MG/1
2 TABLET ORAL NIGHTLY
Qty: 180 TABLET | Refills: 3 | Status: SHIPPED | OUTPATIENT
Start: 2020-09-29 | End: 2021-09-30 | Stop reason: SDUPTHER

## 2020-09-29 RX ORDER — AMLODIPINE BESYLATE 10 MG/1
10 TABLET ORAL NIGHTLY
Qty: 90 TABLET | Refills: 3 | Status: SHIPPED | OUTPATIENT
Start: 2020-09-29 | End: 2020-10-08 | Stop reason: HOSPADM

## 2020-09-29 RX ORDER — BLOOD-GLUCOSE METER
1 KIT MISCELLANEOUS AS NEEDED
Qty: 1 EACH | Refills: 3 | Status: SHIPPED | OUTPATIENT
Start: 2020-09-29

## 2020-09-29 RX ORDER — LANCETS
EACH MISCELLANEOUS
Qty: 100 EACH | Refills: 5 | Status: SHIPPED | OUTPATIENT
Start: 2020-09-29 | End: 2021-01-08 | Stop reason: SDUPTHER

## 2020-09-29 RX ORDER — SYRINGE AND NEEDLE,INSULIN,1ML 31GX15/64"
SYRINGE, EMPTY DISPOSABLE MISCELLANEOUS
Qty: 100 EACH | Refills: 5 | Status: SHIPPED | OUTPATIENT
Start: 2020-09-29 | End: 2021-09-30

## 2020-09-29 RX ORDER — METOPROLOL SUCCINATE 50 MG/1
50 TABLET, EXTENDED RELEASE ORAL DAILY
Qty: 90 TABLET | Refills: 3 | Status: SHIPPED | OUTPATIENT
Start: 2020-09-29 | End: 2021-09-30 | Stop reason: SDUPTHER

## 2020-09-29 RX ORDER — TRAZODONE HYDROCHLORIDE 300 MG/1
300 TABLET ORAL
Qty: 90 TABLET | Refills: 3 | Status: SHIPPED | OUTPATIENT
Start: 2020-09-29 | End: 2021-09-30 | Stop reason: SDUPTHER

## 2020-09-29 RX ORDER — METHOCARBAMOL 500 MG/1
500 TABLET, FILM COATED ORAL 4 TIMES DAILY
Qty: 56 TABLET | Refills: 3 | Status: SHIPPED | OUTPATIENT
Start: 2020-09-29 | End: 2021-02-02 | Stop reason: SDUPTHER

## 2020-09-29 RX ORDER — INSULIN GLARGINE 100 [IU]/ML
60 INJECTION, SOLUTION SUBCUTANEOUS NIGHTLY
Qty: 50 ML | Refills: 3 | Status: SHIPPED | OUTPATIENT
Start: 2020-09-29 | End: 2021-09-30 | Stop reason: SDUPTHER

## 2020-09-29 NOTE — PROGRESS NOTES
CC: Establish Care      Subjective:  Yordy Hansen is a 40 y.o. male who presents for     In to Establish care. Has had routine labs done within the last month.   Chronic health conditions:  DM- Takes Lantus and Humalog for. Checks blood sugars at least 2 times daily or if symptomatic.    Hyperlipidemia- Takes Lipitor and Fenofibrate for    HTN- Takes Norvasc and Lisinopril for.    Sleep Apnea- on CPAP wears every night or he doesn't sleep.    RLS- takes Mirapex 2mg nightly for.    Has history of PE Factor II deficiency takes xarelto for    GERD takes protonix for and does well with    CAD- On Ranexa, Isosorbide, Effient, and Metoprolol for    Chronic low back pain- not currently on any medication for. States has been on Robaxin in the past and did well with. Wondering if he can get a refill.    Asthma- Albuterol and Atrovent as needed    Peripheral Neuropathy- takes Gabapentin for and does well with. Wondering if this could be increased.    Insomnia- takes Trazodone for and does well with.      The following portions of the patient's history were reviewed and updated as appropriate: allergies, current medications, past family history, past medical history, past social history, past surgical history and problem list.    Past Medical History:   Diagnosis Date   • Asthma    • Chest pain    • Chronic back pain    • Coronary artery disease    • Diabetes mellitus (CMS/HCC)     10.6% was last A1C per patient   • Factor II deficiency (CMS/HCC)    • Fatty liver    • GERD (gastroesophageal reflux disease)    • Hyperlipidemia    • Hypertension    • Morbid obesity (CMS/HCC)    • Pulmonary embolism (CMS/HCC)    • RLS (restless legs syndrome)    • Seizures (CMS/HCC)     last one many years ago   • Sleep apnea     c-pap         Current Outpatient Medications:   •  albuterol (ACCUNEB) 1.25 MG/3ML nebulizer solution, Take 3 mL by nebulization Every 6 (Six) Hours As Needed for Wheezing., Disp: 100 vial, Rfl: 0  •  albuterol  (PROVENTIL HFA;VENTOLIN HFA) 108 (90 BASE) MCG/ACT inhaler, Inhale 2 puffs Every 4 (Four) Hours As Needed for Wheezing., Disp: , Rfl:   •  amLODIPine (NORVASC) 10 MG tablet, Take 1 tablet by mouth Every Night., Disp: 90 tablet, Rfl: 3  •  aspirin 81 MG chewable tablet, Chew 81 mg Daily., Disp: , Rfl:   •  atorvastatin (LIPITOR) 80 MG tablet, Take 1 tablet by mouth Every Night., Disp: 90 tablet, Rfl: 3  •  fenofibrate micronized (LOFIBRA) 134 MG capsule, Take 1 capsule by mouth Every Morning Before Breakfast., Disp: 90 capsule, Rfl: 3  •  gabapentin (NEURONTIN) 300 MG capsule, Take 2 capsules by mouth 2 (Two) Times a Day., Disp: 120 capsule, Rfl: 0  •  glucose monitor monitoring kit, 1 each As Needed (check blood glucose 3x daily)., Disp: 1 each, Rfl: 3  •  insulin glargine (LANTUS) 100 UNIT/ML injection, Inject 60 Units under the skin into the appropriate area as directed Every Night., Disp: 50 mL, Rfl: 3  •  insulin lispro (HumaLOG) 100 UNIT/ML injection, Inject 15 Units under the skin into the appropriate area as directed 3 (Three) Times a Day Before Meals., Disp: 30 mL, Rfl: 3  •  ipratropium (ATROVENT HFA) 17 MCG/ACT inhaler, Inhale 2 puffs., Disp: , Rfl:   •  isosorbide mononitrate (IMDUR) 120 MG 24 hr tablet, Take 1 tablet by mouth Every Night., Disp: 90 tablet, Rfl: 3  •  lisinopril (PRINIVIL,ZESTRIL) 40 MG tablet, Take 1 tablet by mouth Every Night., Disp: 90 tablet, Rfl: 3  •  metoprolol succinate XL (TOPROL-XL) 50 MG 24 hr tablet, Take 1 tablet by mouth Daily., Disp: 90 tablet, Rfl: 3  •  Microlet Lancets misc, One touch delica lancets, Disp: 100 each, Rfl: 5  •  nitroglycerin (NITROSTAT) 0.4 MG SL tablet, Place 1 tablet under the tongue Every 5 (Five) Minutes As Needed for Chest Pain. Take no more than 3 doses in 15 minutes., Disp: 100 tablet, Rfl: 5  •  pantoprazole (PROTONIX) 40 MG EC tablet, Take 1 tablet by mouth Every Night., Disp: 90 tablet, Rfl: 3  •  pramipexole (MIRAPEX) 1 MG tablet, Take 2  "tablets by mouth Every Night., Disp: 180 tablet, Rfl: 3  •  prasugrel (EFFIENT) 10 MG tablet, Take 1 tablet by mouth Daily., Disp: 90 tablet, Rfl: 3  •  ranolazine (RANEXA) 1000 MG 12 hr tablet, Take 1 tablet by mouth Every 12 (Twelve) Hours., Disp: 60 tablet, Rfl: 5  •  ReliOn Insulin Syringe 31G X 15/64\" 0.5 ML misc, For use with insulin, Disp: 100 each, Rfl: 5  •  rivaroxaban (XARELTO) 20 MG tablet, Take 1 tablet by mouth Daily With Dinner., Disp: 90 tablet, Rfl: 3  •  traZODone (DESYREL) 300 MG tablet, Take 1 tablet by mouth every night at bedtime., Disp: 90 tablet, Rfl: 3  •  isosorbide mononitrate (IMDUR) 120 MG 24 hr tablet, Take 1 tablet by mouth Every Morning for 30 days., Disp: 30 tablet, Rfl: 0  •  methocarbamol (Robaxin) 500 MG tablet, Take 1 tablet by mouth 4 (Four) Times a Day., Disp: 56 tablet, Rfl: 3  •  metoprolol succinate XL (TOPROL-XL) 200 MG 24 hr tablet, Take 200 mg by mouth., Disp: , Rfl:   •  nitroglycerin (NITROSTAT) 0.4 MG SL tablet, Place 1 tablet under the tongue Every 5 (Five) Minutes As Needed for Chest Pain for up to 15 days., Disp: 25 tablet, Rfl: 6    Review of Systems    Review of Systems   Constitutional: Negative.    HENT: Negative.    Eyes: Negative.    Respiratory: Negative.    Cardiovascular: Negative.    Gastrointestinal: Positive for nausea.   Endocrine: Negative.    Genitourinary: Negative.    Musculoskeletal: Positive for back pain.   Skin: Negative.    Allergic/Immunologic: Negative.    Neurological: Positive for numbness.   Hematological: Negative.    Psychiatric/Behavioral: Negative.    All other systems reviewed and are negative.      Objective  Vitals:    09/29/20 0957   BP: 143/82   Pulse: 77   Resp: 18   Temp: 98 °F (36.7 °C)   TempSrc: Oral   SpO2: 95%   Weight: (!) 205 kg (451 lb)   Height: 180.3 cm (70.98\")     Body mass index is 62.93 kg/m².    Physical Exam    Physical Exam  Vitals signs and nursing note reviewed.   Constitutional:       General: He is not in " acute distress.     Appearance: Normal appearance. He is well-developed. He is obese. He is not ill-appearing, toxic-appearing or diaphoretic.   HENT:      Head: Normocephalic and atraumatic.      Right Ear: External ear normal.      Left Ear: External ear normal.   Eyes:      General: No scleral icterus.        Right eye: No discharge.         Left eye: No discharge.      Extraocular Movements: Extraocular movements intact.      Conjunctiva/sclera: Conjunctivae normal.   Neck:      Musculoskeletal: Normal range of motion and neck supple. No neck rigidity or muscular tenderness.      Vascular: No carotid bruit.   Cardiovascular:      Rate and Rhythm: Normal rate and regular rhythm.      Pulses: Normal pulses.      Heart sounds: Normal heart sounds. No murmur. No friction rub. No gallop.    Pulmonary:      Effort: Pulmonary effort is normal. No respiratory distress.      Breath sounds: Normal breath sounds. No stridor. No wheezing, rhonchi or rales.   Chest:      Chest wall: No tenderness.   Abdominal:      General: Bowel sounds are normal. There is no distension.      Palpations: Abdomen is soft. There is no mass.      Tenderness: There is no abdominal tenderness. There is no guarding or rebound.      Hernia: No hernia is present.   Musculoskeletal:         General: No swelling or tenderness.   Lymphadenopathy:      Cervical: No cervical adenopathy.   Skin:     General: Skin is warm and dry.      Capillary Refill: Capillary refill takes less than 2 seconds.      Coloration: Skin is not jaundiced or pale.      Findings: No bruising, erythema, lesion or rash.   Neurological:      General: No focal deficit present.      Mental Status: He is alert and oriented to person, place, and time. Mental status is at baseline.      Cranial Nerves: No cranial nerve deficit.   Psychiatric:         Mood and Affect: Mood normal.         Behavior: Behavior normal.         Thought Content: Thought content normal.         Judgment:  "Judgment normal.             Yordy was seen today for establish care.    Diagnoses and all orders for this visit:    Type 2 diabetes mellitus with diabetic neuropathy, with long-term current use of insulin (CMS/[facility])  -     gabapentin (NEURONTIN) 300 MG capsule; Take 2 capsules by mouth 2 (Two) Times a Day.    Type 2 diabetes mellitus with hyperglycemia, with long-term current use of insulin (CMS/[facility])  -     glucose monitor monitoring kit; 1 each As Needed (check blood glucose 3x daily).  -     insulin glargine (LANTUS) 100 UNIT/ML injection; Inject 60 Units under the skin into the appropriate area as directed Every Night.  -     insulin lispro (HumaLOG) 100 UNIT/ML injection; Inject 15 Units under the skin into the appropriate area as directed 3 (Three) Times a Day Before Meals.  -     Microlet Lancets misc; One touch delica lancets  -     ReliOn Insulin Syringe 31G X 15/64\" 0.5 ML misc; For use with insulin    Muscle spasm  -     methocarbamol (Robaxin) 500 MG tablet; Take 1 tablet by mouth 4 (Four) Times a Day.    Other chronic pulmonary embolism without acute cor pulmonale (CMS/[facility])  -     rivaroxaban (XARELTO) 20 MG tablet; Take 1 tablet by mouth Daily With Dinner.    Coronary artery disease involving native coronary artery of native heart without angina pectoris  -     isosorbide mononitrate (IMDUR) 120 MG 24 hr tablet; Take 1 tablet by mouth Every Night.  -     metoprolol succinate XL (TOPROL-XL) 50 MG 24 hr tablet; Take 1 tablet by mouth Daily.  -     nitroglycerin (NITROSTAT) 0.4 MG SL tablet; Place 1 tablet under the tongue Every 5 (Five) Minutes As Needed for Chest Pain. Take no more than 3 doses in 15 minutes.  -     prasugrel (EFFIENT) 10 MG tablet; Take 1 tablet by mouth Daily.    Morbid obesity (CMS/[facility])    Factor II deficiency (CMS/[facility])  -     rivaroxaban (XARELTO) 20 MG tablet; Take 1 tablet by mouth Daily With Dinner.    Hyperlipidemia, unspecified hyperlipidemia type  -     atorvastatin (LIPITOR) " 80 MG tablet; Take 1 tablet by mouth Every Night.  -     fenofibrate micronized (LOFIBRA) 134 MG capsule; Take 1 capsule by mouth Every Morning Before Breakfast.    Essential hypertension  -     amLODIPine (NORVASC) 10 MG tablet; Take 1 tablet by mouth Every Night.  -     lisinopril (PRINIVIL,ZESTRIL) 40 MG tablet; Take 1 tablet by mouth Every Night.    ADOLFO on CPAP    RLS (restless legs syndrome)  -     pramipexole (MIRAPEX) 1 MG tablet; Take 2 tablets by mouth Every Night.    Gastroesophageal reflux disease, esophagitis presence not specified  -     pantoprazole (PROTONIX) 40 MG EC tablet; Take 1 tablet by mouth Every Night.    Mild intermittent asthma without complication    Insomnia, unspecified type  -     traZODone (DESYREL) 300 MG tablet; Take 1 tablet by mouth every night at bedtime.       Will refill medications as above.  We will add back Robaxin for chronic lower back pain/muscle spasm.  We will also increase gabapentin as above.  We will see patient back in office in 2 months for diabetes checkup and Medicare wellness.  Patient has had routine labs done within the last month.  Answered all questions.  Patient verbalized understanding of plan of care.      This document has been electronically signed by LALA Munoz on September 29, 2020 11:18 CDT

## 2020-10-07 ENCOUNTER — HOSPITAL ENCOUNTER (OUTPATIENT)
Facility: HOSPITAL | Age: 40
Setting detail: OBSERVATION
Discharge: HOME | End: 2020-10-08
Attending: INTERNAL MEDICINE | Admitting: INTERNAL MEDICINE

## 2020-10-07 PROBLEM — I25.10 CORONARY ARTERY DISEASE: Status: ACTIVE | Noted: 2018-03-30

## 2020-10-07 PROBLEM — I48.91 A-FIB (HCC): Status: ACTIVE | Noted: 2020-10-07

## 2020-10-07 PROBLEM — I48.91 ATRIAL FIBRILLATION WITH RVR (HCC): Status: ACTIVE | Noted: 2020-10-07

## 2020-10-07 PROBLEM — I48.0 PAROXYSMAL ATRIAL FIBRILLATION (HCC): Status: ACTIVE | Noted: 2020-10-07

## 2020-10-07 PROBLEM — I48.0 PAROXYSMAL ATRIAL FIBRILLATION (HCC): Chronic | Status: ACTIVE | Noted: 2020-10-07

## 2020-10-07 LAB
GLUCOSE BLDC GLUCOMTR-MCNC: 253 MG/DL (ref 70–130)
GLUCOSE BLDC GLUCOMTR-MCNC: 272 MG/DL (ref 70–130)
GLUCOSE BLDC GLUCOMTR-MCNC: 276 MG/DL (ref 70–130)
SARS-COV-2 N GENE RESP QL NAA+PROBE: NOT DETECTED

## 2020-10-07 PROCEDURE — G0379 DIRECT REFER HOSPITAL OBSERV: HCPCS

## 2020-10-07 PROCEDURE — 63710000001 INSULIN ASPART PER 5 UNITS: Performed by: STUDENT IN AN ORGANIZED HEALTH CARE EDUCATION/TRAINING PROGRAM

## 2020-10-07 PROCEDURE — 87635 SARS-COV-2 COVID-19 AMP PRB: CPT | Performed by: INTERNAL MEDICINE

## 2020-10-07 PROCEDURE — 82962 GLUCOSE BLOOD TEST: CPT

## 2020-10-07 PROCEDURE — G0378 HOSPITAL OBSERVATION PER HR: HCPCS

## 2020-10-07 PROCEDURE — 94660 CPAP INITIATION&MGMT: CPT

## 2020-10-07 PROCEDURE — 96366 THER/PROPH/DIAG IV INF ADDON: CPT

## 2020-10-07 PROCEDURE — C9803 HOPD COVID-19 SPEC COLLECT: HCPCS

## 2020-10-07 PROCEDURE — 96365 THER/PROPH/DIAG IV INF INIT: CPT

## 2020-10-07 PROCEDURE — 63710000001 INSULIN DETEMIR PER 5 UNITS: Performed by: STUDENT IN AN ORGANIZED HEALTH CARE EDUCATION/TRAINING PROGRAM

## 2020-10-07 RX ORDER — NITROGLYCERIN 0.4 MG/1
0.4 TABLET SUBLINGUAL
Status: DISCONTINUED | OUTPATIENT
Start: 2020-10-07 | End: 2020-10-08 | Stop reason: HOSPADM

## 2020-10-07 RX ORDER — PANTOPRAZOLE SODIUM 40 MG/1
40 TABLET, DELAYED RELEASE ORAL NIGHTLY
Status: DISCONTINUED | OUTPATIENT
Start: 2020-10-07 | End: 2020-10-08 | Stop reason: HOSPADM

## 2020-10-07 RX ORDER — ACETAMINOPHEN 160 MG/5ML
650 SOLUTION ORAL EVERY 4 HOURS PRN
Status: DISCONTINUED | OUTPATIENT
Start: 2020-10-07 | End: 2020-10-08 | Stop reason: HOSPADM

## 2020-10-07 RX ORDER — ASPIRIN 81 MG/1
81 TABLET, CHEWABLE ORAL DAILY
Status: DISCONTINUED | OUTPATIENT
Start: 2020-10-07 | End: 2020-10-08 | Stop reason: HOSPADM

## 2020-10-07 RX ORDER — SODIUM CHLORIDE 0.9 % (FLUSH) 0.9 %
10 SYRINGE (ML) INJECTION EVERY 12 HOURS SCHEDULED
Status: DISCONTINUED | OUTPATIENT
Start: 2020-10-07 | End: 2020-10-08 | Stop reason: HOSPADM

## 2020-10-07 RX ORDER — PRASUGREL 10 MG/1
10 TABLET, FILM COATED ORAL DAILY
Status: DISCONTINUED | OUTPATIENT
Start: 2020-10-07 | End: 2020-10-08 | Stop reason: HOSPADM

## 2020-10-07 RX ORDER — BISACODYL 5 MG/1
5 TABLET, DELAYED RELEASE ORAL DAILY PRN
Status: DISCONTINUED | OUTPATIENT
Start: 2020-10-07 | End: 2020-10-08 | Stop reason: HOSPADM

## 2020-10-07 RX ORDER — METOPROLOL SUCCINATE 50 MG/1
50 TABLET, EXTENDED RELEASE ORAL DAILY
Status: DISCONTINUED | OUTPATIENT
Start: 2020-10-07 | End: 2020-10-08 | Stop reason: HOSPADM

## 2020-10-07 RX ORDER — ATORVASTATIN CALCIUM 40 MG/1
80 TABLET, FILM COATED ORAL NIGHTLY
Status: DISCONTINUED | OUTPATIENT
Start: 2020-10-07 | End: 2020-10-08 | Stop reason: HOSPADM

## 2020-10-07 RX ORDER — DILTIAZEM HCL IN NACL,ISO-OSM 125 MG/125
5-15 PLASTIC BAG, INJECTION (ML) INTRAVENOUS
Status: DISCONTINUED | OUTPATIENT
Start: 2020-10-07 | End: 2020-10-07

## 2020-10-07 RX ORDER — DILTIAZEM HCL IN NACL,ISO-OSM 125 MG/125
5 PLASTIC BAG, INJECTION (ML) INTRAVENOUS
Status: DISCONTINUED | OUTPATIENT
Start: 2020-10-07 | End: 2020-10-07

## 2020-10-07 RX ORDER — ACETAMINOPHEN 650 MG/1
650 SUPPOSITORY RECTAL EVERY 4 HOURS PRN
Status: DISCONTINUED | OUTPATIENT
Start: 2020-10-07 | End: 2020-10-08 | Stop reason: HOSPADM

## 2020-10-07 RX ORDER — PRAMIPEXOLE DIHYDROCHLORIDE 1 MG/1
2 TABLET ORAL NIGHTLY
Status: DISCONTINUED | OUTPATIENT
Start: 2020-10-07 | End: 2020-10-08 | Stop reason: HOSPADM

## 2020-10-07 RX ORDER — TRAZODONE HYDROCHLORIDE 150 MG/1
300 TABLET ORAL NIGHTLY
Status: DISCONTINUED | OUTPATIENT
Start: 2020-10-07 | End: 2020-10-08 | Stop reason: HOSPADM

## 2020-10-07 RX ORDER — LISINOPRIL 40 MG/1
40 TABLET ORAL NIGHTLY
Status: DISCONTINUED | OUTPATIENT
Start: 2020-10-07 | End: 2020-10-08 | Stop reason: HOSPADM

## 2020-10-07 RX ORDER — METHOCARBAMOL 500 MG/1
500 TABLET, FILM COATED ORAL 4 TIMES DAILY
Status: DISCONTINUED | OUTPATIENT
Start: 2020-10-07 | End: 2020-10-08 | Stop reason: HOSPADM

## 2020-10-07 RX ORDER — ACETAMINOPHEN 325 MG/1
650 TABLET ORAL EVERY 4 HOURS PRN
Status: DISCONTINUED | OUTPATIENT
Start: 2020-10-07 | End: 2020-10-08 | Stop reason: HOSPADM

## 2020-10-07 RX ORDER — DILTIAZEM HCL IN NACL,ISO-OSM 125 MG/125
5 PLASTIC BAG, INJECTION (ML) INTRAVENOUS CONTINUOUS
Status: DISCONTINUED | OUTPATIENT
Start: 2020-10-07 | End: 2020-10-08

## 2020-10-07 RX ORDER — AMLODIPINE BESYLATE 10 MG/1
10 TABLET ORAL NIGHTLY
Status: DISCONTINUED | OUTPATIENT
Start: 2020-10-07 | End: 2020-10-07

## 2020-10-07 RX ORDER — SODIUM CHLORIDE 0.9 % (FLUSH) 0.9 %
10 SYRINGE (ML) INJECTION AS NEEDED
Status: DISCONTINUED | OUTPATIENT
Start: 2020-10-07 | End: 2020-10-08 | Stop reason: HOSPADM

## 2020-10-07 RX ORDER — ISOSORBIDE MONONITRATE 60 MG/1
120 TABLET, EXTENDED RELEASE ORAL EVERY MORNING
Status: DISCONTINUED | OUTPATIENT
Start: 2020-10-07 | End: 2020-10-08 | Stop reason: HOSPADM

## 2020-10-07 RX ORDER — RANOLAZINE 500 MG/1
1000 TABLET, EXTENDED RELEASE ORAL EVERY 12 HOURS SCHEDULED
Status: DISCONTINUED | OUTPATIENT
Start: 2020-10-07 | End: 2020-10-08 | Stop reason: HOSPADM

## 2020-10-07 RX ADMIN — ATORVASTATIN CALCIUM 80 MG: 40 TABLET, FILM COATED ORAL at 21:31

## 2020-10-07 RX ADMIN — METHOCARBAMOL 500 MG: 500 TABLET, FILM COATED ORAL at 17:52

## 2020-10-07 RX ADMIN — METHOCARBAMOL 500 MG: 500 TABLET, FILM COATED ORAL at 21:32

## 2020-10-07 RX ADMIN — METHOCARBAMOL 500 MG: 500 TABLET, FILM COATED ORAL at 13:55

## 2020-10-07 RX ADMIN — METOPROLOL SUCCINATE 50 MG: 50 TABLET, EXTENDED RELEASE ORAL at 12:28

## 2020-10-07 RX ADMIN — INSULIN DETEMIR 60 UNITS: 100 INJECTION, SOLUTION SUBCUTANEOUS at 21:32

## 2020-10-07 RX ADMIN — ACETAMINOPHEN 650 MG: 325 TABLET, FILM COATED ORAL at 12:28

## 2020-10-07 RX ADMIN — PRASUGREL 10 MG: 10 TABLET, FILM COATED ORAL at 12:28

## 2020-10-07 RX ADMIN — RANOLAZINE 1000 MG: 500 TABLET, FILM COATED, EXTENDED RELEASE ORAL at 12:28

## 2020-10-07 RX ADMIN — ISOSORBIDE MONONITRATE 120 MG: 60 TABLET, EXTENDED RELEASE ORAL at 12:28

## 2020-10-07 RX ADMIN — INSULIN ASPART 15 UNITS: 100 INJECTION, SOLUTION INTRAVENOUS; SUBCUTANEOUS at 17:52

## 2020-10-07 RX ADMIN — Medication 5 MG/HR: at 13:56

## 2020-10-07 RX ADMIN — LISINOPRIL 40 MG: 40 TABLET ORAL at 21:31

## 2020-10-07 RX ADMIN — ACETAMINOPHEN 650 MG: 325 TABLET, FILM COATED ORAL at 21:32

## 2020-10-07 RX ADMIN — RIVAROXABAN 20 MG: 10 TABLET, FILM COATED ORAL at 17:52

## 2020-10-07 RX ADMIN — INSULIN ASPART 15 UNITS: 100 INJECTION, SOLUTION INTRAVENOUS; SUBCUTANEOUS at 12:35

## 2020-10-07 RX ADMIN — PANTOPRAZOLE SODIUM 40 MG: 40 TABLET, DELAYED RELEASE ORAL at 21:31

## 2020-10-07 RX ADMIN — TRAZODONE HYDROCHLORIDE 300 MG: 150 TABLET ORAL at 21:31

## 2020-10-07 RX ADMIN — SODIUM CHLORIDE, PRESERVATIVE FREE 10 ML: 5 INJECTION INTRAVENOUS at 21:32

## 2020-10-07 RX ADMIN — RANOLAZINE 1000 MG: 500 TABLET, FILM COATED, EXTENDED RELEASE ORAL at 21:31

## 2020-10-07 RX ADMIN — PRAMIPEXOLE DIHYDROCHLORIDE 2 MG: 1 TABLET ORAL at 21:32

## 2020-10-07 RX ADMIN — ASPIRIN 81 MG: 81 TABLET, CHEWABLE ORAL at 12:28

## 2020-10-07 NOTE — ACP (ADVANCE CARE PLANNING)
The patient desires to be full code. He designates Magen Hansen, his father, who can be reached at [phone] to make medical decisions on his behalf if He is incapable of doing so.  Patient acknowledged and stated this on 10/7/2020 at 1140.        This document has been electronically signed by Nancy Lyman MD on October 7, 2020 14:39 CDT

## 2020-10-07 NOTE — H&P
"    HISTORY AND PHYSICAL  NAME: Yordy Hansen  : 1980  MRN: [id number]    DATE OF ADMISSION: 10/07/20    DATE & TIME SEEN: 10/07/20 13:51 CDT    PCP: Mami Perez APRN    CODE STATUS:   Code Status and Medical Interventions:   Ordered at: 10/07/20 1201     Level Of Support Discussed With:    Patient     Code Status:    CPR     Medical Interventions (Level of Support Prior to Arrest):    Full       CHIEF COMPLAINT: Heart racing, feeling funny, chest pain    HPI:  Yordy Hansen is a 40 y.o. male with a PMH of COPD, hyperlipidemia, hypertension, coronary artery disease, status post 3 LAD stents on separate occasions, morbid obesity, and diabetes who presents with complaining of \"heart racing\" and mild chest pain that started at 3 AM this morning.  He states that he went to go help a friend at the chicken farm, and realized that his heart was racing, and had some chest pain and decided to go home.  En route he had some nitroglycerin that he took and helped alleviate his chest pain.  He states on his way home he decided to go to the [place] emergency department and while there states that his heart rate was 156 and he had chest pain.  He states he was started on a Cardizem drip and then transferred to [facility].  Currently the patient states that he has a dull achy pain in the center of his chest, does not radiate and is not reproducible on palpation.  He denies any shortness of breath, N/V, dizziness or lightheadedness.  He does note that at the onset of the incident he also had a headache and currently complains of having headache.  He denies any other symptoms.      CONCURRENT MEDICAL HISTORY:  Past Medical History:   Diagnosis Date   • CAD (coronary artery disease)    • Chronic back pain    • Chronic pulmonary embolism (CMS/HCC)    • Coronary artery disease    • Diabetes mellitus (CMS/HCC)    • Factor II deficiency (CMS/HCC)    • Fatty liver    • GERD (gastroesophageal reflux disease)    • " Hyperlipidemia    • Hypertension    • Morbid obesity (CMS/HCC)    • RLS (restless legs syndrome)    • Seizures (CMS/[facility])    • Sleep apnea        PAST SURGICAL HISTORY:  Past Surgical History:   Procedure Laterality Date   • CARDIAC CATHETERIZATION N/A 3/15/2017   • CARDIAC CATHETERIZATION N/A 3/15/2017   • CARDIAC CATHETERIZATION N/A 3/1/2018   • CARDIAC CATHETERIZATION N/A 2020   • CHOLECYSTECTOMY     • CORONARY ANGIOPLASTY WITH STENT PLACEMENT     • ID RT/LT HEART CATHETERS N/A 3/15/2017   • TRANSESOPHAGEAL ECHOCARDIOGRAM ([facility])         FAMILY HISTORY:  Family History   Problem Relation Age of Onset   • Heart disease Mother    • Obesity Mother    • Sleep apnea Father    • Cancer Paternal Grandfather         SOCIAL HISTORY:  Social History     Socioeconomic History   • Marital status:      Spouse name: Cori   • Number of children: 0   • Years of education: Not on file   • Highest education level: Not on file   Occupational History   • Occupation: Disabled   Tobacco Use   • Smoking status: Former Smoker     Packs/day: 0.25     Years: 0.50     Pack years: 0.12     Types: Cigarettes     Quit date:      Years since quittin.7   • Smokeless tobacco: Current User     Types: Snuff   Substance and Sexual Activity   • Alcohol use: No   • Drug use: No   • Sexual activity: Not Currently       HOME MEDICATIONS:  Prior to Admission medications    Medication Sig Start Date End Date Taking? Authorizing Provider   albuterol (ACCUNEB) 1.25 MG/3ML nebulizer solution Take 3 mL by nebulization Every 6 (Six) Hours As Needed for Wheezing. 11/15/17  Yes Tahmina Reid APRN   albuterol (PROVENTIL HFA;VENTOLIN HFA) 108 (90 BASE) MCG/ACT inhaler Inhale 2 puffs Every 4 (Four) Hours As Needed for Wheezing.   Yes Provider, MD Latrice   amLODIPine (NORVASC) 10 MG tablet Take 1 tablet by mouth Every Night. 20  Yes Mami Perez APRN   aspirin 81 MG chewable tablet Chew 81 mg Daily.   Yes Provider,  MD Latrice   atorvastatin (LIPITOR) 80 MG tablet Take 1 tablet by mouth Every Night. 9/29/20  Yes Mami Perez APRN   fenofibrate micronized (LOFIBRA) 134 MG capsule Take 1 capsule by mouth Every Morning Before Breakfast. 9/29/20  Yes Mami Perez APRN   gabapentin (NEURONTIN) 300 MG capsule Take 2 capsules by mouth 2 (Two) Times a Day. 9/29/20  Yes Mami Perez APRN   glucose monitor monitoring kit 1 each As Needed (check blood glucose 3x daily). 9/29/20  Yes Mami Perez APRN   insulin glargine (LANTUS) 100 UNIT/ML injection Inject 60 Units under the skin into the appropriate area as directed Every Night. 9/29/20  Yes Mami Perez APRN   insulin lispro (HumaLOG) 100 UNIT/ML injection Inject 15 Units under the skin into the appropriate area as directed 3 (Three) Times a Day Before Meals. 9/29/20  Yes Mami Perez APRN   ipratropium (ATROVENT HFA) 17 MCG/ACT inhaler Inhale 2 puffs. 4/8/20 4/9/21 Yes ProviderLatrice MD   isosorbide mononitrate (IMDUR) 120 MG 24 hr tablet Take 1 tablet by mouth Every Morning for 30 days. 10/10/17 9/29/21 Yes Minesh Gregg MD   lisinopril (PRINIVIL,ZESTRIL) 40 MG tablet Take 1 tablet by mouth Every Night. 9/29/20  Yes Mami Perez APRN   methocarbamol (Robaxin) 500 MG tablet Take 1 tablet by mouth 4 (Four) Times a Day. 9/29/20  Yes Mami Perez APRN   metoprolol succinate XL (TOPROL-XL) 50 MG 24 hr tablet Take 1 tablet by mouth Daily. 9/29/20  Yes Mami Perez APRN   nitroglycerin (NITROSTAT) 0.4 MG SL tablet Place 1 tablet under the tongue Every 5 (Five) Minutes As Needed for Chest Pain for up to 15 days. 10/10/17 9/29/21 Yes Minesh Gregg MD   pantoprazole (PROTONIX) 40 MG EC tablet Take 1 tablet by mouth Every Night. 9/29/20  Yes Mami Perez APRN   pramipexole (MIRAPEX) 1 MG tablet Take 2 tablets by mouth Every Night. 9/29/20  Yes Mami Perez APRN  "  prasugrel (EFFIENT) 10 MG tablet Take 1 tablet by mouth Daily. 9/29/20  Yes Mami Perez APRN   ranolazine (RANEXA) 1000 MG 12 hr tablet Take 1 tablet by mouth Every 12 (Twelve) Hours. 11/10/17  Yes Earnest Perez MD   rivaroxaban (XARELTO) 20 MG tablet Take 1 tablet by mouth Daily With Dinner. 9/29/20  Yes Mami Perez APRN   traZODone (DESYREL) 300 MG tablet Take 1 tablet by mouth every night at bedtime. 9/29/20  Yes Mami Perez APRN   Microlet Lancets misc One touch delica lancets 9/29/20   Mami Perez APRN   ReliOn Insulin Syringe 31G X 15/64\" 0.5 ML misc For use with insulin 9/29/20   Mami Perez APRN   isosorbide mononitrate (IMDUR) 120 MG 24 hr tablet Take 1 tablet by mouth Every Night. 9/29/20 10/7/20  Mami Perez APRN   metoprolol succinate XL (TOPROL-XL) 200 MG 24 hr tablet Take 200 mg by mouth. 11/22/17 10/7/20  Provider, MD Latrice   nitroglycerin (NITROSTAT) 0.4 MG SL tablet Place 1 tablet under the tongue Every 5 (Five) Minutes As Needed for Chest Pain. Take no more than 3 doses in 15 minutes. 9/29/20 10/7/20  Mami Perez APRN       ALLERGIES:  Glipizide, Reglan [metoclopramide], Tramadol, and Risperidone    REVIEW OF SYSTEMS  Review of Systems   Constitutional: Negative for chills and fever.   HENT: Negative for sore throat and trouble swallowing.    Eyes: Negative for photophobia and visual disturbance.   Respiratory: Negative for cough and shortness of breath.    Cardiovascular: Positive for chest pain and leg swelling.   Gastrointestinal: Negative for abdominal pain and nausea.   Genitourinary: Negative for difficulty urinating and flank pain.   Musculoskeletal: Negative for back pain.   Skin: Negative for wound.   Neurological: Positive for headaches. Negative for dizziness.   Psychiatric/Behavioral: Negative for confusion. The patient is not nervous/anxious.        PHYSICAL EXAM:  Temp:  [97.9 °F (36.6 °C)-98.2 " °F (36.8 °C)] 97.9 °F (36.6 °C)  Heart Rate:  [72-78] 77  Resp:  [16-18] 16  BP: (144-183)/(64-99) 144/64  Body mass index is 57.29 kg/m².  Physical Exam  Vitals signs reviewed.   Constitutional:       Appearance: He is well-developed. He is morbidly obese. He is not ill-appearing or diaphoretic.   HENT:      Head: Normocephalic.      Right Ear: Hearing and external ear normal.      Left Ear: Hearing and external ear normal.      Nose: Nose normal.      Mouth/Throat:      Lips: Pink.      Mouth: Mucous membranes are moist.      Dentition: Abnormal dentition.      Pharynx: Oropharynx is clear.   Eyes:      General: Lids are normal.      Conjunctiva/sclera: Conjunctivae normal.      Pupils: Pupils are equal, round, and reactive to light.   Cardiovascular:      Rate and Rhythm: Normal rate and regular rhythm.      Pulses: Normal pulses.      Heart sounds: Normal heart sounds. No murmur. No friction rub. No gallop.    Pulmonary:      Effort: Pulmonary effort is normal.      Breath sounds: Normal breath sounds. No wheezing, rhonchi or rales.   Abdominal:      General: Bowel sounds are normal.      Palpations: Abdomen is soft.      Tenderness: There is no abdominal tenderness.   Skin:     General: Skin is warm.   Neurological:      Mental Status: He is alert and oriented to person, place, and time.   Psychiatric:         Speech: Speech normal.         Behavior: Behavior normal. Behavior is cooperative.         Thought Content: Thought content normal.         Judgment: Judgment normal.         DIAGNOSTIC DATA:   Lab Results (last 24 hours)     Procedure Component Value Units Date/Time    POC Glucose Once [[id number]]  (Abnormal) Collected: 10/07/20 1233    Specimen: Blood Updated: 10/07/20 1324     Glucose 272 mg/dL            Imaging Results (Last 24 Hours)     ** No results found for the last 24 hours. **          I reviewed the patient's new clinical results.  I reviewed the patient's new imaging results and agree with  the interpretation.  I personally viewed and interpreted the patient's EKG/Telemetry data  Discussed with Dr. Griffiths.    ASSESSMENT AND PLAN: This is a 40 y.o. male with:    Active Hospital Problems    Diagnosis POA   • **Paroxysmal atrial fibrillation (CMS/HCC) [I48.0] Yes   • Atrial fibrillation with RVR (CMS/HCC) [I48.91] Yes   • Morbid obesity (CMS/HCC) [E66.01] Yes   • Hypertension [I10] Yes   • Diabetes mellitus (CMS/HCC) [E11.9] Yes   • Coronary artery disease [I25.10] Yes   • Chronic pulmonary embolism (CMS/HCC) [I27.82] Yes   • Hyperlipidemia [E78.5] Yes         DVT prophylaxis:   Mechanical Order History:     None      Pharmalogical Order History:      Ordered     Dose Route Frequency Stop    10/07/20 1201  rivaroxaban (XARELTO) tablet 20 mg      20 mg PO Daily With Dinner --               Code status is   Code Status and Medical Interventions:   Ordered at: 10/07/20 1201     Level Of Support Discussed With:    Patient     Code Status:    CPR     Medical Interventions (Level of Support Prior to Arrest):    Full        Yordy Hansen and I have discussed pain goals for this hospitalization after reviewing his current clinical condition, medical history and prior pain experiences.  The goal is to keep his  pain level 3.  To help achieve this, I plan to Tylenol for mild pain, Norco for moderate pain..    CARY # [id number], reviewed and consistent with patient reported medications.      I discussed the patient's findings and my recommendations with patient and consulting provider.     Dr. Griffiths is the attending on record at time of admission, he is aware of the patient's status and agrees with the above history and physical.        This document has been electronically signed by Nancy Lyman MD on October 7, 2020 13:51 CDT     Part of this note may be an electronic transcription/translation of spoken language to printed text using the Dragon Dictation System.

## 2020-10-07 NOTE — PLAN OF CARE
Problem: Adult Inpatient Plan of Care  Goal: Plan of Care Review  Outcome: Ongoing, Progressing  Flowsheets (Taken 10/7/2020 4494)  Progress: no change  Plan of Care Reviewed With: patient  Outcome Summary: received fromICU   Goal Outcome Evaluation:  Plan of Care Reviewed With: patient  Progress: no change  Outcome Summary: received fromICU

## 2020-10-08 ENCOUNTER — READMISSION MANAGEMENT (OUTPATIENT)
Dept: CALL CENTER | Facility: HOSPITAL | Age: 40
End: 2020-10-08

## 2020-10-08 VITALS
TEMPERATURE: 98.3 F | OXYGEN SATURATION: 95 % | SYSTOLIC BLOOD PRESSURE: 114 MMHG | HEIGHT: 71 IN | BODY MASS INDEX: 44.1 KG/M2 | WEIGHT: 315 LBS | HEART RATE: 73 BPM | RESPIRATION RATE: 18 BRPM | DIASTOLIC BLOOD PRESSURE: 55 MMHG

## 2020-10-08 LAB
ALBUMIN SERPL-MCNC: 3.5 G/DL (ref 3.5–5.2)
ALBUMIN/GLOB SERPL: 1 G/DL
ALP SERPL-CCNC: 77 U/L (ref 39–117)
ALT SERPL W P-5'-P-CCNC: 21 U/L (ref 1–41)
AMPHET+METHAMPHET UR QL: NEGATIVE
AMPHETAMINES UR QL: NEGATIVE
ANION GAP SERPL CALCULATED.3IONS-SCNC: 8 MMOL/L (ref 5–15)
AST SERPL-CCNC: 18 U/L (ref 1–40)
BARBITURATES UR QL SCN: NEGATIVE
BASOPHILS # BLD AUTO: 0.05 10*3/MM3 (ref 0–0.2)
BASOPHILS NFR BLD AUTO: 0.7 % (ref 0–1.5)
BENZODIAZ UR QL SCN: NEGATIVE
BILIRUB SERPL-MCNC: 0.6 MG/DL (ref 0–1.2)
BUN SERPL-MCNC: 13 MG/DL (ref 6–20)
BUN/CREAT SERPL: 16.9 (ref 7–25)
BUPRENORPHINE SERPL-MCNC: NEGATIVE NG/ML
CALCIUM SPEC-SCNC: 8.9 MG/DL (ref 8.6–10.5)
CANNABINOIDS SERPL QL: NEGATIVE
CHLORIDE SERPL-SCNC: 96 MMOL/L (ref 98–107)
CO2 SERPL-SCNC: 29 MMOL/L (ref 22–29)
COCAINE UR QL: NEGATIVE
CREAT SERPL-MCNC: 0.77 MG/DL (ref 0.76–1.27)
DEPRECATED RDW RBC AUTO: 43.6 FL (ref 37–54)
EOSINOPHIL # BLD AUTO: 0.32 10*3/MM3 (ref 0–0.4)
EOSINOPHIL NFR BLD AUTO: 4.2 % (ref 0.3–6.2)
ERYTHROCYTE [DISTWIDTH] IN BLOOD BY AUTOMATED COUNT: 14.4 % (ref 12.3–15.4)
GFR SERPL CREATININE-BSD FRML MDRD: 111 ML/MIN/1.73
GLOBULIN UR ELPH-MCNC: 3.4 GM/DL
GLUCOSE BLDC GLUCOMTR-MCNC: 258 MG/DL (ref 70–130)
GLUCOSE BLDC GLUCOMTR-MCNC: 312 MG/DL (ref 70–130)
GLUCOSE SERPL-MCNC: 285 MG/DL (ref 65–99)
HCT VFR BLD AUTO: 39.9 % (ref 37.5–51)
HGB BLD-MCNC: 13.3 G/DL (ref 13–17.7)
IMM GRANULOCYTES # BLD AUTO: 0.1 10*3/MM3 (ref 0–0.05)
IMM GRANULOCYTES NFR BLD AUTO: 1.3 % (ref 0–0.5)
INR PPP: 1.17 (ref 0.8–1.2)
LYMPHOCYTES # BLD AUTO: 1.61 10*3/MM3 (ref 0.7–3.1)
LYMPHOCYTES NFR BLD AUTO: 21.3 % (ref 19.6–45.3)
MAGNESIUM SERPL-MCNC: 1.9 MG/DL (ref 1.6–2.6)
MCH RBC QN AUTO: 27.8 PG (ref 26.6–33)
MCHC RBC AUTO-ENTMCNC: 33.3 G/DL (ref 31.5–35.7)
MCV RBC AUTO: 83.3 FL (ref 79–97)
METHADONE UR QL SCN: NEGATIVE
MONOCYTES # BLD AUTO: 0.73 10*3/MM3 (ref 0.1–0.9)
MONOCYTES NFR BLD AUTO: 9.7 % (ref 5–12)
NEUTROPHILS NFR BLD AUTO: 4.74 10*3/MM3 (ref 1.7–7)
NEUTROPHILS NFR BLD AUTO: 62.8 % (ref 42.7–76)
NRBC BLD AUTO-RTO: 0 /100 WBC (ref 0–0.2)
OPIATES UR QL: POSITIVE
OXYCODONE UR QL SCN: NEGATIVE
PCP UR QL SCN: NEGATIVE
PLATELET # BLD AUTO: 192 10*3/MM3 (ref 140–450)
PMV BLD AUTO: 8.9 FL (ref 6–12)
POTASSIUM SERPL-SCNC: 4.1 MMOL/L (ref 3.5–5.2)
PROPOXYPH UR QL: NEGATIVE
PROT SERPL-MCNC: 6.9 G/DL (ref 6–8.5)
PROTHROMBIN TIME: 15.4 SECONDS (ref 11.1–15.3)
RBC # BLD AUTO: 4.79 10*6/MM3 (ref 4.14–5.8)
SODIUM SERPL-SCNC: 133 MMOL/L (ref 136–145)
TRICYCLICS UR QL SCN: NEGATIVE
WBC # BLD AUTO: 7.55 10*3/MM3 (ref 3.4–10.8)

## 2020-10-08 PROCEDURE — G0008 ADMIN INFLUENZA VIRUS VAC: HCPCS | Performed by: STUDENT IN AN ORGANIZED HEALTH CARE EDUCATION/TRAINING PROGRAM

## 2020-10-08 PROCEDURE — 80306 DRUG TEST PRSMV INSTRMNT: CPT | Performed by: STUDENT IN AN ORGANIZED HEALTH CARE EDUCATION/TRAINING PROGRAM

## 2020-10-08 PROCEDURE — 82962 GLUCOSE BLOOD TEST: CPT

## 2020-10-08 PROCEDURE — 85610 PROTHROMBIN TIME: CPT | Performed by: STUDENT IN AN ORGANIZED HEALTH CARE EDUCATION/TRAINING PROGRAM

## 2020-10-08 PROCEDURE — 96366 THER/PROPH/DIAG IV INF ADDON: CPT

## 2020-10-08 PROCEDURE — 93005 ELECTROCARDIOGRAM TRACING: CPT | Performed by: INTERNAL MEDICINE

## 2020-10-08 PROCEDURE — 85025 COMPLETE CBC W/AUTO DIFF WBC: CPT | Performed by: STUDENT IN AN ORGANIZED HEALTH CARE EDUCATION/TRAINING PROGRAM

## 2020-10-08 PROCEDURE — 93010 ELECTROCARDIOGRAM REPORT: CPT | Performed by: INTERNAL MEDICINE

## 2020-10-08 PROCEDURE — 90686 IIV4 VACC NO PRSV 0.5 ML IM: CPT | Performed by: STUDENT IN AN ORGANIZED HEALTH CARE EDUCATION/TRAINING PROGRAM

## 2020-10-08 PROCEDURE — G0378 HOSPITAL OBSERVATION PER HR: HCPCS

## 2020-10-08 PROCEDURE — 25010000002 INFLUENZA VAC SPLIT QUAD 0.5 ML SUSPENSION PREFILLED SYRINGE: Performed by: STUDENT IN AN ORGANIZED HEALTH CARE EDUCATION/TRAINING PROGRAM

## 2020-10-08 PROCEDURE — 63710000001 INSULIN ASPART PER 5 UNITS: Performed by: STUDENT IN AN ORGANIZED HEALTH CARE EDUCATION/TRAINING PROGRAM

## 2020-10-08 PROCEDURE — 94799 UNLISTED PULMONARY SVC/PX: CPT

## 2020-10-08 PROCEDURE — 83735 ASSAY OF MAGNESIUM: CPT | Performed by: STUDENT IN AN ORGANIZED HEALTH CARE EDUCATION/TRAINING PROGRAM

## 2020-10-08 PROCEDURE — 80053 COMPREHEN METABOLIC PANEL: CPT | Performed by: STUDENT IN AN ORGANIZED HEALTH CARE EDUCATION/TRAINING PROGRAM

## 2020-10-08 RX ORDER — DILTIAZEM HYDROCHLORIDE 120 MG/1
120 CAPSULE, COATED, EXTENDED RELEASE ORAL DAILY
Qty: 90 CAPSULE | Refills: 0 | Status: SHIPPED | OUTPATIENT
Start: 2020-10-08

## 2020-10-08 RX ORDER — LANOLIN ALCOHOL/MO/W.PET/CERES
600 CREAM (GRAM) TOPICAL ONCE
Status: COMPLETED | OUTPATIENT
Start: 2020-10-08 | End: 2020-10-08

## 2020-10-08 RX ORDER — MAGNESIUM SULFATE 1 G/100ML
1 INJECTION INTRAVENOUS ONCE
Status: DISCONTINUED | OUTPATIENT
Start: 2020-10-08 | End: 2020-10-08

## 2020-10-08 RX ADMIN — SODIUM CHLORIDE, PRESERVATIVE FREE 10 ML: 5 INJECTION INTRAVENOUS at 08:48

## 2020-10-08 RX ADMIN — DILTIAZEM HYDROCHLORIDE 30 MG: 30 TABLET, FILM COATED ORAL at 06:16

## 2020-10-08 RX ADMIN — METHOCARBAMOL 500 MG: 500 TABLET, FILM COATED ORAL at 08:47

## 2020-10-08 RX ADMIN — ASPIRIN 81 MG: 81 TABLET, CHEWABLE ORAL at 08:47

## 2020-10-08 RX ADMIN — ISOSORBIDE MONONITRATE 120 MG: 60 TABLET, EXTENDED RELEASE ORAL at 06:16

## 2020-10-08 RX ADMIN — PRASUGREL 10 MG: 10 TABLET, FILM COATED ORAL at 08:47

## 2020-10-08 RX ADMIN — METHOCARBAMOL 500 MG: 500 TABLET, FILM COATED ORAL at 11:13

## 2020-10-08 RX ADMIN — INSULIN ASPART 15 UNITS: 100 INJECTION, SOLUTION INTRAVENOUS; SUBCUTANEOUS at 11:13

## 2020-10-08 RX ADMIN — RANOLAZINE 1000 MG: 500 TABLET, FILM COATED, EXTENDED RELEASE ORAL at 08:47

## 2020-10-08 RX ADMIN — METOPROLOL SUCCINATE 50 MG: 50 TABLET, EXTENDED RELEASE ORAL at 08:47

## 2020-10-08 RX ADMIN — INSULIN ASPART 15 UNITS: 100 INJECTION, SOLUTION INTRAVENOUS; SUBCUTANEOUS at 08:47

## 2020-10-08 RX ADMIN — INFLUENZA VIRUS VACCINE 0.5 ML: 15; 15; 15; 15 SUSPENSION INTRAMUSCULAR at 14:16

## 2020-10-08 RX ADMIN — DILTIAZEM HYDROCHLORIDE 30 MG: 30 TABLET, FILM COATED ORAL at 11:13

## 2020-10-08 RX ADMIN — Medication 600 MG: at 12:57

## 2020-10-08 NOTE — OUTREACH NOTE
Prep Survey      Responses   [facility] facility patient discharged from?  [place]   Is LACE score < 7 ?  Yes   Eligibility  [facility]   Date of Admission  10/07/20   Date of Discharge  10/08/20   Discharge Disposition  Home or Self Care   Discharge diagnosis  A-fib with RVR, HTN   Does the patient have one of the following disease processes/diagnoses(primary or secondary)?  Other   Does the patient have Home health ordered?  No   Is there a DME ordered?  No   Prep survey completed?  Yes          Fe Sales RN

## 2020-10-08 NOTE — PLAN OF CARE
Goal Outcome Evaluation:  Plan of Care Reviewed With: patient  Progress: improving  Outcome Summary: Pt has been in NSR overnight. HR is now in low 60s. Cardizem gtt d/c and po cardizem ordered.

## 2020-10-08 NOTE — DISCHARGE SUMMARY
[facility]  DISCHARGE SUMMARY       Date of Admission: 10/7/2020  Date of Discharge:  10/8/2020  Primary Care Physician: Mami Perez APRN    Presenting Problem/History of Present Illness:  Atrial fibrillation with RVR (CMS/[facility]) [I48.91]  A-fib (CMS/[facility]) [I48.91]       Final Discharge Diagnoses:  Active Hospital Problems    Diagnosis   • **Paroxysmal atrial fibrillation (CMS/[facility])   • Atrial fibrillation with RVR (CMS/[facility])   • A-fib (CMS/[facility])   • Morbid obesity (CMS/[facility])   • Hypertension   • Diabetes mellitus (CMS/[facility])   • Coronary artery disease   • Chronic pulmonary embolism (CMS/[facility])   • Hyperlipidemia       Consults:   Consults     No orders found for last 30 day(s).          Procedures Performed: None              Pertinent Test Results:   Collected Updated Procedure Result Status    10/08/2020 0621 10/08/2020 0639 Urine Drug Screen - Urine, Clean Catch [[id number]]    (Abnormal)   Urine, Clean Catch    Final result Component Value   THC Screen, Urine Negative   Phencyclidine (PCP), Urine Negative   Cocaine Screen, Urine Negative   Methamphetamine, Ur Negative   Opiate Screen PositiveAbnormal    Amphetamine, Urine Qual Negative   Benzodiazepine Screen, Urine Negative   Tricyclic Antidepressants Screen Negative   Methadone Screen , Urine Negative   Barbiturates Screen, Urine Negative   Oxycodone Screen, Urine Negative   Propoxyphene Screen Negative   Buprenorphine, Screen, Urine Negative           10/08/2020 0606 10/08/2020 0654 CBC Auto Differential [[id number]]   (Abnormal)   Blood    Final result Component Value Units   WBC 7.55 10*3/mm3   RBC 4.79 10*6/mm3   Hemoglobin 13.3 g/dL   Hematocrit 39.9 %   MCV 83.3 fL   MCH 27.8 pg   MCHC 33.3 g/dL   RDW 14.4 %   RDW-SD 43.6 fl   MPV 8.9 fL   Platelets 192 10*3/mm3   Neutrophil Rel % 62.8 %   Lymphocyte Rel % 21.3 %   Monocyte Rel % 9.7 %   Eosinophil Rel % 4.2 %   Basophil Rel % 0.7 %   Immature  "Granulocyte Rel % 1.3High  %   Neutrophils Absolute 4.74 10*3/mm3   Lymphocytes Absolute 1.61 10*3/mm3   Monocytes Absolute 0.73 10*3/mm3   Eosinophils Absolute 0.32 10*3/mm3   Basophils Absolute 0.05 10*3/mm3   Immature Grans, Absolute 0.10High  10*3/mm3   nRBC 0.0 /100 WBC           10/08/2020 0606 10/08/2020 0726 Comprehensive Metabolic Panel [[id number]]    (Abnormal)   Blood    Final result Component Value Units   Glucose 285High  mg/dL   BUN 13 mg/dL   Creatinine 0.77 mg/dL   Sodium 133Low  mmol/L   Potassium 4.1 mmol/L   Chloride 96Low  mmol/L   CO2 29.0 mmol/L   Calcium 8.9 mg/dL   Total Protein 6.9 g/dL   Albumin 3.50 g/dL   ALT (SGPT) 21 U/L   AST (SGOT) 18 U/L   Alkaline Phosphatase 77 U/L   Total Bilirubin 0.6 mg/dL   eGFR Non African Am 111 mL/min/1.73   Globulin 3.4 gm/dL   A/G Ratio 1.0 g/dL   BUN/Creatinine Ratio 16.9    Anion Gap 8.0 mmol/L           10/08/2020 0606 10/08/2020 0706 Protime-INR [[id number]]    (Abnormal)   Blood    Final result Component Value Units   Protime 15.4High  Seconds   INR 1.17            10/08/2020 0606 10/08/2020 0726 Magnesium [[id number]]   Blood    Final result Component Value Units   Magnesium 1.9 mg/dL               Chief Complaint on Day of Discharge: None    Hospital Course:  The patient is a 40 y.o. male with a PMH of COPD, hyperlipidemia, hypertension, coronary artery disease, status post 3 LAD stents on separate occasions, morbid obesity, and diabetes who presented with complaining of \"heart racing\" and mild chest pain that started at 3 AM on the morning of 10/7/20.  He states that on his way home he decided to go to the [place] emergency department and while there states that his heart rate was 156 and he had chest pain.  He states he was started on a Cardizem drip and then transferred to [facility].  He was found to be in atrial fibrillation with rapid ventricular rate on arrival [facility] and was continued on a Cardizem drip.  He subsequently " "converted to normal sinus rhythm and he was transitioned to oral Cardizem.  He was discharged in stable condition and instructed to follow-up with his primary care provider and cardiologist.    Condition on Discharge:  Stable    Physical Exam on Discharge:  /55 (BP Location: Right arm, Patient Position: Lying)   Pulse 73   Temp 98.3 °F (36.8 °C) (Temporal)   Resp 18   Ht 180.3 cm (71\")   Wt (!) 201 kg (443 lb)   SpO2 95%   BMI 61.79 kg/m²   Physical Exam  Constitutional:       General: He is not in acute distress.     Appearance: Normal appearance. He is well-developed. He is not diaphoretic.   HENT:      Head: Normocephalic.   Eyes:      Conjunctiva/sclera: Conjunctivae normal.      Pupils: Pupils are equal, round, and reactive to light.   Neck:      Musculoskeletal: Normal range of motion and neck supple.      Thyroid: No thyromegaly.      Vascular: No JVD.      Trachea: No tracheal deviation.   Cardiovascular:      Rate and Rhythm: Normal rate and regular rhythm.      Heart sounds: Normal heart sounds. No murmur. No friction rub. No gallop.    Pulmonary:      Effort: Pulmonary effort is normal. No respiratory distress.      Breath sounds: Normal breath sounds. No stridor. No wheezing or rales.   Chest:      Chest wall: No tenderness.   Abdominal:      General: Bowel sounds are normal. There is no distension.      Palpations: Abdomen is soft. There is no mass.      Tenderness: There is no abdominal tenderness. There is no guarding or rebound.      Hernia: No hernia is present.   Musculoskeletal: Normal range of motion.         General: No tenderness or deformity.   Lymphadenopathy:      Cervical: No cervical adenopathy.   Skin:     General: Skin is warm and dry.      Capillary Refill: Capillary refill takes less than 2 seconds.      Coloration: Skin is not pale.      Findings: No erythema or rash.   Neurological:      Mental Status: He is alert and oriented to person, place, and time.      Cranial " Nerves: No cranial nerve deficit.      Sensory: No sensory deficit.      Motor: No abnormal muscle tone.      Coordination: Coordination normal.   Psychiatric:         Behavior: Behavior normal.         Thought Content: Thought content normal.         Judgment: Judgment normal.         Discharge Disposition:  Home or Self Care    Discharge Medications:     Discharge Medications      New Medications      Instructions Start Date   dilTIAZem  MG 24 hr capsule  Commonly known as: Cardizem CD   120 mg, Oral, Daily         Continue These Medications      Instructions Start Date   albuterol sulfate  (90 Base) MCG/ACT inhaler  Commonly known as: PROVENTIL HFA;VENTOLIN HFA;PROAIR HFA   2 puffs, Inhalation, Every 4 Hours PRN      albuterol 1.25 MG/3ML nebulizer solution  Commonly known as: ACCUNEB   1.25 mg, Nebulization, Every 6 Hours PRN      aspirin 81 MG chewable tablet   81 mg, Oral, Daily      atorvastatin 80 MG tablet  Commonly known as: LIPITOR   80 mg, Oral, Nightly      fenofibrate micronized 134 MG capsule  Commonly known as: LOFIBRA   134 mg, Oral, Every Morning Before Breakfast      gabapentin 300 MG capsule  Commonly known as: NEURONTIN   600 mg, Oral, 2 Times Daily - RT      glucose monitor monitoring kit   1 each, Does not apply, As Needed      insulin glargine 100 UNIT/ML injection  Commonly known as: LANTUS   60 Units, Subcutaneous, Nightly      insulin lispro 100 UNIT/ML injection  Commonly known as: HumaLOG   15 Units, Subcutaneous, 3 Times Daily Before Meals      ipratropium 17 MCG/ACT inhaler  Commonly known as: ATROVENT HFA   2 puffs, Inhalation      isosorbide mononitrate 120 MG 24 hr tablet  Commonly known as: IMDUR   120 mg, Oral, Every Morning      lisinopril 40 MG tablet  Commonly known as: PRINIVIL,ZESTRIL   40 mg, Oral, Nightly      methocarbamol 500 MG tablet  Commonly known as: Robaxin   500 mg, Oral, 4 Times Daily      metoprolol succinate XL 50 MG 24 hr tablet  Commonly known as:  "TOPROL-XL   50 mg, Oral, Daily      Microlet Lancets misc   One touch delica lancets      nitroglycerin 0.4 MG SL tablet  Commonly known as: NITROSTAT   0.4 mg, Sublingual, Every 5 Minutes PRN      pantoprazole 40 MG EC tablet  Commonly known as: PROTONIX   40 mg, Oral, Nightly      pramipexole 1 MG tablet  Commonly known as: MIRAPEX   2 mg, Oral, Nightly      prasugrel 10 MG tablet  Commonly known as: EFFIENT   10 mg, Oral, Daily      ranolazine 1000 MG 12 hr tablet  Commonly known as: RANEXA   1,000 mg, Oral, Every 12 Hours Scheduled      ReliOn Insulin Syringe 31G X 15/64\" 0.5 ML misc  Generic drug: Insulin Syringe-Needle U-100   For use with insulin      rivaroxaban 20 MG tablet  Commonly known as: XARELTO   20 mg, Oral, Daily With Dinner      traZODone 300 MG tablet  Commonly known as: DESYREL   300 mg, Oral, Every Night at Bedtime         Stop These Medications    amLODIPine 10 MG tablet  Commonly known as: NORVASC            Discharge Diet:   Diet Instructions     Diet: Cardiac      Discharge Diet: Cardiac          Activity at Discharge:   Activity Instructions     Activity as Tolerated            Discharge Care Plan/Instructions:   1.  Follow-up with your primary care provider within 1 week of discharge.   2.  Follow-up with cardiologist Dr. Tejada within 1 week of discharge.    Follow-up Appointments:   Future Appointments   Date Time Provider Department Center   12/29/2020 10:30 AM Mami Perez APRN [facility] None       Test Results Pending at Discharge:     Judy Crews MD  10/08/20  13:41 CDT    Time: 35 minutes of time was spent evaluating patient and planning discharge.      Part of this note may be an electronic transcription/translation of spoken language to printed text using the Dragon Dictation system.          "

## 2020-10-09 ENCOUNTER — TRANSITIONAL CARE MANAGEMENT TELEPHONE ENCOUNTER (OUTPATIENT)
Dept: CALL CENTER | Facility: HOSPITAL | Age: 40
End: 2020-10-09

## 2020-10-09 NOTE — OUTREACH NOTE
Call Center TCM Note      Responses   [facility] patient discharged from?  [place]   Does the patient have one of the following disease processes/diagnoses(primary or secondary)?  Other   TCM attempt successful?  Yes   Call start time  1333   Call end time  1336   Discharge diagnosis  A-fib with RVR, HTN   Meds reviewed with patient/caregiver?  Yes   Is the patient having any side effects they believe may be caused by any medication additions or changes?  No   Does the patient have all medications ordered at discharge?  Yes   Is the patient taking all medications as directed (includes completed medication regime)?  Yes   Does the patient have a primary care provider?   Yes   Does the patient have an appointment with their PCP within 7 days of discharge?  Yes   Comments regarding PCP  Hospital d/c f/u appt is on 10/13/20 at 11:00 am    Has the patient kept scheduled appointments due by today?  N/A   Psychosocial issues?  No   Did the patient receive a copy of their discharge instructions?  Yes   Nursing interventions  Reviewed instructions with patient   What is the patient's perception of their health status since discharge?  Improving   Is the patient/caregiver able to teach back signs and symptoms related to disease process for when to call PCP?  Yes   Is the patient/caregiver able to teach back signs and symptoms related to disease process for when to call 911?  Yes   Is the patient/caregiver able to teach back the hierarchy of who to call/visit for symptoms/problems? PCP, Specialist, Home health nurse, Urgent Care, ED, 911  Yes   TCM call completed?  Yes          Merlene Dias RN    10/9/2020, 13:36 EDT

## 2020-10-09 NOTE — OUTREACH NOTE
Call Center TCM Note      Responses   [facility] patient discharged from?  [place]   Does the patient have one of the following disease processes/diagnoses(primary or secondary)?  Other   TCM attempt successful?  No   Unsuccessful attempts  Attempt 1 [Verbal release is over a year old]          Merlene Dias RN    10/9/2020, 09:17 EDT

## 2020-10-13 ENCOUNTER — OFFICE VISIT (OUTPATIENT)
Dept: FAMILY MEDICINE CLINIC | Facility: CLINIC | Age: 40
End: 2020-10-13

## 2020-10-13 VITALS
OXYGEN SATURATION: 94 % | DIASTOLIC BLOOD PRESSURE: 78 MMHG | SYSTOLIC BLOOD PRESSURE: 122 MMHG | HEART RATE: 71 BPM | TEMPERATURE: 97.1 F | RESPIRATION RATE: 20 BRPM | BODY MASS INDEX: 44.1 KG/M2 | HEIGHT: 71 IN | WEIGHT: 315 LBS

## 2020-10-13 DIAGNOSIS — Z09 HOSPITAL DISCHARGE FOLLOW-UP: Primary | ICD-10-CM

## 2020-10-13 DIAGNOSIS — I48.91 ATRIAL FIBRILLATION WITH RVR (HCC): ICD-10-CM

## 2020-10-13 PROCEDURE — 99213 OFFICE O/P EST LOW 20 MIN: CPT | Performed by: NURSE PRACTITIONER

## 2020-10-13 NOTE — PROGRESS NOTES
CC: Atrial Fibrillation (ER f/u)      Subjective:  Yordy Hansen is a 40 y.o. male who presents for     In for Hospital follow up. Developed fast heart rate on 10/7/2020 went to ER at [facility] and was initiated on a Cardizem drip. Transferred to [facility] for further evaluation and treatment. Was admitted and followed by Hospitalist. Pt converted to NSR with Cardizem drip and was changed to PO Cardizem. Pt has appointment with Cardiologist on Thursday.    Atrial Fibrillation  Presents for follow-up visit. Symptoms include chest pain and tachycardia. Symptoms are negative for palpitations and shortness of breath. The symptoms have been resolved. Past medical history includes atrial fibrillation. There are no medication compliance problems.       The following portions of the patient's history were reviewed and updated as appropriate: allergies, current medications, past family history, past medical history, past social history, past surgical history and problem list.    Past Medical History:   Diagnosis Date   • CAD (coronary artery disease)    • Chronic back pain    • Chronic pulmonary embolism (CMS/HCC)    • Coronary artery disease    • Diabetes mellitus (CMS/HCC)    • Factor II deficiency (CMS/HCC)    • Fatty liver    • GERD (gastroesophageal reflux disease)    • Hyperlipidemia    • Hypertension    • Morbid obesity (CMS/HCC)    • RLS (restless legs syndrome)    • Seizures (CMS/HCC)    • Sleep apnea          Current Outpatient Medications:   •  albuterol (ACCUNEB) 1.25 MG/3ML nebulizer solution, Take 3 mL by nebulization Every 6 (Six) Hours As Needed for Wheezing., Disp: 100 vial, Rfl: 0  •  albuterol (PROVENTIL HFA;VENTOLIN HFA) 108 (90 BASE) MCG/ACT inhaler, Inhale 2 puffs Every 4 (Four) Hours As Needed for Wheezing., Disp: , Rfl:   •  aspirin 81 MG chewable tablet, Chew 81 mg Daily., Disp: , Rfl:   •  atorvastatin (LIPITOR) 80 MG tablet, Take 1 tablet by mouth Every Night., Disp: 90 tablet, Rfl: 3  •   dilTIAZem CD (Cardizem CD) 120 MG 24 hr capsule, Take 1 capsule by mouth Daily., Disp: 90 capsule, Rfl: 0  •  fenofibrate micronized (LOFIBRA) 134 MG capsule, Take 1 capsule by mouth Every Morning Before Breakfast., Disp: 90 capsule, Rfl: 3  •  gabapentin (NEURONTIN) 300 MG capsule, Take 2 capsules by mouth 2 (Two) Times a Day., Disp: 120 capsule, Rfl: 0  •  glucose monitor monitoring kit, 1 each As Needed (check blood glucose 3x daily)., Disp: 1 each, Rfl: 3  •  insulin glargine (LANTUS) 100 UNIT/ML injection, Inject 60 Units under the skin into the appropriate area as directed Every Night., Disp: 50 mL, Rfl: 3  •  insulin lispro (HumaLOG) 100 UNIT/ML injection, Inject 15 Units under the skin into the appropriate area as directed 3 (Three) Times a Day Before Meals., Disp: 30 mL, Rfl: 3  •  ipratropium (ATROVENT HFA) 17 MCG/ACT inhaler, Inhale 2 puffs., Disp: , Rfl:   •  isosorbide mononitrate (IMDUR) 120 MG 24 hr tablet, Take 1 tablet by mouth Every Morning for 30 days., Disp: 30 tablet, Rfl: 0  •  lisinopril (PRINIVIL,ZESTRIL) 40 MG tablet, Take 1 tablet by mouth Every Night., Disp: 90 tablet, Rfl: 3  •  methocarbamol (Robaxin) 500 MG tablet, Take 1 tablet by mouth 4 (Four) Times a Day., Disp: 56 tablet, Rfl: 3  •  metoprolol succinate XL (TOPROL-XL) 50 MG 24 hr tablet, Take 1 tablet by mouth Daily., Disp: 90 tablet, Rfl: 3  •  Microlet Lancets misc, One touch delica lancets, Disp: 100 each, Rfl: 5  •  nitroglycerin (NITROSTAT) 0.4 MG SL tablet, Place 1 tablet under the tongue Every 5 (Five) Minutes As Needed for Chest Pain for up to 15 days., Disp: 25 tablet, Rfl: 6  •  pantoprazole (PROTONIX) 40 MG EC tablet, Take 1 tablet by mouth Every Night., Disp: 90 tablet, Rfl: 3  •  pramipexole (MIRAPEX) 1 MG tablet, Take 2 tablets by mouth Every Night., Disp: 180 tablet, Rfl: 3  •  prasugrel (EFFIENT) 10 MG tablet, Take 1 tablet by mouth Daily., Disp: 90 tablet, Rfl: 3  •  ranolazine (RANEXA) 1000 MG 12 hr tablet, Take 1  "tablet by mouth Every 12 (Twelve) Hours., Disp: 60 tablet, Rfl: 5  •  ReliOn Insulin Syringe 31G X 15/64\" 0.5 ML misc, For use with insulin, Disp: 100 each, Rfl: 5  •  rivaroxaban (XARELTO) 20 MG tablet, Take 1 tablet by mouth Daily With Dinner., Disp: 90 tablet, Rfl: 3  •  traZODone (DESYREL) 300 MG tablet, Take 1 tablet by mouth every night at bedtime., Disp: 90 tablet, Rfl: 3    Review of Systems    Review of Systems   Constitutional: Negative.    HENT: Negative.    Eyes: Negative.    Respiratory: Negative for shortness of breath.    Cardiovascular: Positive for chest pain. Negative for palpitations and leg swelling.   Gastrointestinal: Negative.  Negative for diarrhea, nausea and vomiting.   Endocrine: Negative.    Genitourinary: Negative.    Musculoskeletal: Negative.    Skin: Negative.    Allergic/Immunologic: Negative.    Neurological: Negative.    Hematological: Negative.    Psychiatric/Behavioral: Negative.    All other systems reviewed and are negative.      Objective  Vitals:    10/13/20 1028   BP: 122/78   Pulse: 71   Resp: 20   Temp: 97.1 °F (36.2 °C)   TempSrc: Oral   SpO2: 94%   Weight: (!) 201 kg (444 lb)   Height: 180.3 cm (71\")     Body mass index is 61.93 kg/m².    Physical Exam    Physical Exam  Vitals signs and nursing note reviewed.   Constitutional:       General: He is not in acute distress.     Appearance: Normal appearance. He is well-developed. He is obese. He is not ill-appearing, toxic-appearing or diaphoretic.   HENT:      Head: Normocephalic and atraumatic.      Right Ear: External ear normal.      Left Ear: External ear normal.   Eyes:      General: No scleral icterus.        Right eye: No discharge.         Left eye: No discharge.      Extraocular Movements: Extraocular movements intact.      Conjunctiva/sclera: Conjunctivae normal.   Neck:      Musculoskeletal: Normal range of motion and neck supple. No neck rigidity or muscular tenderness.      Vascular: No carotid bruit. "   Cardiovascular:      Rate and Rhythm: Normal rate and regular rhythm.      Heart sounds: Normal heart sounds. No murmur. No friction rub. No gallop.    Pulmonary:      Effort: Pulmonary effort is normal. No respiratory distress.      Breath sounds: Normal breath sounds. No stridor. No wheezing, rhonchi or rales.   Chest:      Chest wall: No tenderness.   Abdominal:      General: Bowel sounds are normal. There is no distension.      Palpations: Abdomen is soft. There is no mass.      Tenderness: There is no abdominal tenderness. There is no guarding or rebound.      Hernia: No hernia is present.   Lymphadenopathy:      Cervical: No cervical adenopathy.   Skin:     General: Skin is warm and dry.      Capillary Refill: Capillary refill takes less than 2 seconds.      Coloration: Skin is not jaundiced or pale.      Findings: No bruising, erythema, lesion or rash.   Neurological:      General: No focal deficit present.      Mental Status: He is alert and oriented to person, place, and time. Mental status is at baseline.   Psychiatric:         Mood and Affect: Mood normal.         Behavior: Behavior normal.         Thought Content: Thought content normal.         Judgment: Judgment normal.             Diagnoses and all orders for this visit:    1. Hospital discharge follow-up (Primary)    2. Atrial fibrillation with RVR (CMS/HCC)  Comments:  Now in NSR    Advised to keep scheduled appointment with cardiology on Thursday.  Advised if develops further symptoms or worsening in symptoms to go to the ER.  Patient is currently in normal sinus rhythm.  To follow-up as needed, otherwise keep scheduled appointment.  Answered all questions.  Patient verbalized understanding of plan of care.        This document has been electronically signed by LALA Munoz on October 13, 2020 10:48 CDT

## 2020-10-13 NOTE — PATIENT INSTRUCTIONS
Atrial Fibrillation    Atrial fibrillation is a type of heartbeat that is irregular or fast. If you have this condition, your heart beats without any order. This makes it hard for your heart to pump blood in a normal way.  Atrial fibrillation may come and go, or it may become a long-lasting problem. If this condition is not treated, it can put you at higher risk for stroke, heart failure, and other heart problems.  What are the causes?  This condition may be caused by diseases that damage the heart. They include:  · High blood pressure.  · Heart failure.  · Heart valve disease.  · Heart surgery.  Other causes include:  · Diabetes.  · Thyroid disease.  · Being overweight.  · Kidney disease.  Sometimes the cause is not known.  What increases the risk?  You are more likely to develop this condition if:  · You are older.  · You smoke.  · You exercise often and very hard.  · You have a family history of this condition.  · You are a man.  · You use drugs.  · You drink a lot of alcohol.  · You have lung conditions, such as emphysema, pneumonia, or COPD.  · You have sleep apnea.  What are the signs or symptoms?  Common symptoms of this condition include:  · A feeling that your heart is beating very fast.  · Chest pain or discomfort.  · Feeling short of breath.  · Suddenly feeling light-headed or weak.  · Getting tired easily during activity.  · Fainting.  · Sweating.  In some cases, there are no symptoms.  How is this treated?  Treatment for this condition depends on underlying conditions and how you feel when you have atrial fibrillation. They include:  · Medicines to:  ? Prevent blood clots.  ? Treat heart rate or heart rhythm problems.  · Using devices, such as a pacemaker, to correct heart rhythm problems.  · Doing surgery to remove the part of the heart that sends bad signals.  · Closing an area where clots can form in the heart (left atrial appendage).  In some cases, your doctor will treat other underlying  conditions.  Follow these instructions at home:  Medicines  · Take over-the-counter and prescription medicines only as told by your doctor.  · Do not take any new medicines without first talking to your doctor.  · If you are taking blood thinners:  ? Talk with your doctor before you take any medicines that have aspirin or NSAIDs, such as ibuprofen, in them.  ? Take your medicine exactly as told by your doctor. Take it at the same time each day.  ? Avoid activities that could hurt or bruise you. Follow instructions about how to prevent falls.  ? Wear a bracelet that says you are taking blood thinners. Or, carry a card that lists what medicines you take.  Lifestyle         · Do not use any products that have nicotine or tobacco in them. These include cigarettes, e-cigarettes, and chewing tobacco. If you need help quitting, ask your doctor.  · Eat heart-healthy foods. Talk with your doctor about the right eating plan for you.  · Exercise regularly as told by your doctor.  · Do not drink alcohol.  · Lose weight if you are overweight.  · Do not use drugs, including cannabis.  General instructions  · If you have a condition that causes breathing to stop for a short period of time (apnea), treat it as told by your doctor.  · Keep a healthy weight. Do not use diet pills unless your doctor says they are safe for you. Diet pills may make heart problems worse.  · Keep all follow-up visits as told by your doctor. This is important.  Contact a doctor if:  · You notice a change in the speed, rhythm, or strength of your heartbeat.  · You are taking a blood-thinning medicine and you get more bruising.  · You get tired more easily when you move or exercise.  · You have a sudden change in weight.  Get help right away if:    · You have pain in your chest or your belly (abdomen).  · You have trouble breathing.  · You have side effects of blood thinners, such as blood in your vomit, poop (stool), or pee (urine), or bleeding that cannot  "stop.  · You have any signs of a stroke. \"BE FAST\" is an easy way to remember the main warning signs:  ? B - Balance. Signs are dizziness, sudden trouble walking, or loss of balance.  ? E - Eyes. Signs are trouble seeing or a change in how you see.  ? F - Face. Signs are sudden weakness or loss of feeling in the face, or the face or eyelid drooping on one side.  ? A - Arms. Signs are weakness or loss of feeling in an arm. This happens suddenly and usually on one side of the body.  ? S - Speech. Signs are sudden trouble speaking, slurred speech, or trouble understanding what people say.  ? T - Time. Time to call emergency services. Write down what time symptoms started.  · You have other signs of a stroke, such as:  ? A sudden, very bad headache with no known cause.  ? Feeling like you may vomit (nausea).  ? Vomiting.  ? A seizure.  These symptoms may be an emergency. Do not wait to see if the symptoms will go away. Get medical help right away. Call your local emergency services (911 in the [place] Do not drive yourself to the hospital.  Summary  · Atrial fibrillation is a type of heartbeat that is irregular or fast.  · You are at higher risk of this condition if you smoke, are older, have diabetes, or are overweight.  · Follow your doctor's instructions about medicines, diet, exercise, and follow-up visits.  · Get help right away if you have signs or symptoms of a stroke.  · Get help right away if you cannot catch your breath, or you have chest pain or discomfort.  This information is not intended to replace advice given to you by your health care provider. Make sure you discuss any questions you have with your health care provider.  Document Released: 09/26/2009 Document Revised: 06/10/2020 Document Reviewed: 06/10/2020  Elsevier Patient Education © 2020 [employer]    "

## 2020-10-15 ENCOUNTER — OFFICE VISIT (OUTPATIENT)
Dept: CARDIOLOGY | Facility: CLINIC | Age: 40
End: 2020-10-15

## 2020-10-15 VITALS
DIASTOLIC BLOOD PRESSURE: 65 MMHG | WEIGHT: 315 LBS | OXYGEN SATURATION: 98 % | BODY MASS INDEX: 44.1 KG/M2 | HEIGHT: 71 IN | SYSTOLIC BLOOD PRESSURE: 120 MMHG | HEART RATE: 98 BPM

## 2020-10-15 DIAGNOSIS — I48.0 PAROXYSMAL ATRIAL FIBRILLATION (HCC): Primary | Chronic | ICD-10-CM

## 2020-10-15 PROCEDURE — 99215 OFFICE O/P EST HI 40 MIN: CPT | Performed by: INTERNAL MEDICINE

## 2020-10-15 PROCEDURE — 93000 ELECTROCARDIOGRAM COMPLETE: CPT | Performed by: INTERNAL MEDICINE

## 2020-10-15 NOTE — PROGRESS NOTES
[facility] Cardiology  OFFICE NOTE    Cardiovascular Medicine  Demetri Tejada M.D., RPVI         No referring provider defined for this encounter.    Thank you for asking me to see Yordy Hansen for CAD.    History of Present Illness  This is a 40 y.o. male with:    1.  Coronary artery disease  2.  Diabetes  3.  Hyperlipidemia  5 hypertension  6.  Morbid obesity  7.  Pulmonary embolism  8.  A. fib     Yordy Hansen is a 40 y.o. male who presents for consultation today. a very pleasant 39-year-old gentleman with history of hypertension, hyperlipidemia, diabetes with extensive history of coronary artery disease, he has PCI x3 to his LAD.  First 1 was in 2016, setting of myocardial infarction, subsequently he had in-stent restenosis in 2017 so he got another layer of stent with a 2.5 x 18 mm Xience, subsequently he was followed by Dr. Briceno and had another PCI to his LAD with a 3.0 Orsiro stent.   Patient was admitted to the hospital again in September with chest pain.  Subsequent nuclear stress test concerning for moderate ischemia anterolateral walls of the underwent repeat cardiac cath.  Cardiac cath in September 2020 showed mild in-stent restenosis with LONNIE-3 flow in the LAD.    Was recently in the hospital again in October with FERNANDO saul with RVR.      Review of Systems - ROS  Constitution: Negative for weakness, weight gain and weight loss.   HENT: Negative for congestion.    Eyes: Negative for blurred vision.   Cardiovascular: As mentioned above  Respiratory: Negative for cough and hemoptysis.    Endocrine: Negative for polydipsia and polyuria.   Hematologic/Lymphatic: Negative for bleeding problem. Does not bruise/bleed easily.   Skin: Negative for flushing.   Musculoskeletal: Negative for neck pain and stiffness.   Gastrointestinal: Negative for abdominal pain, diarrhea, jaundice, melena, nausea and vomiting.   Genitourinary: Negative for dysuria and hematuria.   Neurological: Negative  for dizziness, focal weakness and numbness.   Psychiatric/Behavioral: Negative for altered mental status and depression.          All other systems were reviewed and were negative.    family history includes Cancer in his paternal grandfather; Heart disease in his mother; Obesity in his mother; Sleep apnea in his father.     reports that he quit smoking about 23 years ago. His smoking use included cigarettes. He has a 0.13 pack-year smoking history. His smokeless tobacco use includes snuff. He reports that he does not drink alcohol or use drugs.    Allergies   Allergen Reactions   • Glipizide Other (See Comments) and Unknown (See Comments)     Slurred Speech  Slurred Speech  Hallucinations, Slurred Speech   • Reglan [Metoclopramide] Anxiety   • Tramadol Other (See Comments)     seizures   • Risperidone Other (See Comments)     Slurred speech  Can't stand, trouble breathing, slurred speech           Current Outpatient Medications:   •  albuterol (ACCUNEB) 1.25 MG/3ML nebulizer solution, Take 3 mL by nebulization Every 6 (Six) Hours As Needed for Wheezing., Disp: 100 vial, Rfl: 0  •  albuterol (PROVENTIL HFA;VENTOLIN HFA) 108 (90 BASE) MCG/ACT inhaler, Inhale 2 puffs Every 4 (Four) Hours As Needed for Wheezing., Disp: , Rfl:   •  aspirin 81 MG chewable tablet, Chew 81 mg Daily., Disp: , Rfl:   •  atorvastatin (LIPITOR) 80 MG tablet, Take 1 tablet by mouth Every Night., Disp: 90 tablet, Rfl: 3  •  clindamycin (CLEOCIN) 300 MG capsule, Take 1 capsule by mouth 3 (Three) Times a Day., Disp: 21 capsule, Rfl: 0  •  dilTIAZem CD (Cardizem CD) 120 MG 24 hr capsule, Take 1 capsule by mouth Daily., Disp: 90 capsule, Rfl: 0  •  fenofibrate micronized (LOFIBRA) 134 MG capsule, Take 1 capsule by mouth Every Morning Before Breakfast., Disp: 90 capsule, Rfl: 3  •  gabapentin (NEURONTIN) 300 MG capsule, Take 2 capsules by mouth 2 (Two) Times a Day., Disp: 120 capsule, Rfl: 0  •  glucose monitor monitoring kit, 1 each As Needed (check  "blood glucose 3x daily)., Disp: 1 each, Rfl: 3  •  insulin glargine (LANTUS) 100 UNIT/ML injection, Inject 60 Units under the skin into the appropriate area as directed Every Night., Disp: 50 mL, Rfl: 3  •  insulin lispro (HumaLOG) 100 UNIT/ML injection, Inject 15 Units under the skin into the appropriate area as directed 3 (Three) Times a Day Before Meals., Disp: 30 mL, Rfl: 3  •  ipratropium (ATROVENT HFA) 17 MCG/ACT inhaler, Inhale 2 puffs., Disp: , Rfl:   •  isosorbide mononitrate (IMDUR) 120 MG 24 hr tablet, Take 1 tablet by mouth Every Morning for 30 days., Disp: 30 tablet, Rfl: 0  •  lisinopril (PRINIVIL,ZESTRIL) 40 MG tablet, Take 1 tablet by mouth Every Night., Disp: 90 tablet, Rfl: 3  •  methocarbamol (Robaxin) 500 MG tablet, Take 1 tablet by mouth 4 (Four) Times a Day., Disp: 56 tablet, Rfl: 3  •  metoprolol succinate XL (TOPROL-XL) 50 MG 24 hr tablet, Take 1 tablet by mouth Daily., Disp: 90 tablet, Rfl: 3  •  Microlet Lancets misc, One touch delica lancets, Disp: 100 each, Rfl: 5  •  nitroglycerin (NITROSTAT) 0.4 MG SL tablet, Place 1 tablet under the tongue Every 5 (Five) Minutes As Needed for Chest Pain for up to 15 days., Disp: 25 tablet, Rfl: 6  •  pantoprazole (PROTONIX) 40 MG EC tablet, Take 1 tablet by mouth Every Night., Disp: 90 tablet, Rfl: 3  •  pramipexole (MIRAPEX) 1 MG tablet, Take 2 tablets by mouth Every Night., Disp: 180 tablet, Rfl: 3  •  prasugrel (EFFIENT) 10 MG tablet, Take 1 tablet by mouth Daily., Disp: 90 tablet, Rfl: 3  •  ranolazine (RANEXA) 1000 MG 12 hr tablet, Take 1 tablet by mouth Every 12 (Twelve) Hours., Disp: 60 tablet, Rfl: 5  •  ReliOn Insulin Syringe 31G X 15/64\" 0.5 ML misc, For use with insulin, Disp: 100 each, Rfl: 5  •  rivaroxaban (XARELTO) 20 MG tablet, Take 1 tablet by mouth Daily With Dinner., Disp: 90 tablet, Rfl: 3  •  traZODone (DESYREL) 300 MG tablet, Take 1 tablet by mouth every night at bedtime., Disp: 90 tablet, Rfl: 3    Physical Exam:  Vitals:    " "10/15/20 1131   BP: 120/65   BP Location: Right arm   Patient Position: Sitting   Cuff Size: Adult   Pulse: 98   SpO2: 98%   Weight: (!) 201 kg (444 lb)   Height: 180.3 cm (71\")     Current Pain Level: none  Pulse Ox: Normal  on room air  General: alert, appears stated age and cooperative     Body Habitus: well-nourished    HEENT: Head: Normocephalic, no lesions, without obvious abnormality. No arcus senilis, xanthelasma or xanthomas.    Neuro: alert, oriented x3  Pulses: 2+ and symmetric  JVP: Volume/Pulsation: Normal.  Normal waveforms.   Appropriate inspiratory decrease.  No Kussmaul's. No Regan's.   Carotid Exam: no bruit normal pulsation bilaterally   Carotid Volume: normal.     Respirations: no increased work of breathing   Chest:  Normal    Pulmonary:Normal   Precordium: Normal impulses. P2 is not palpable.  RV Heave: absent  LV Heave: absent  Onida:  normal size and placement  Palpable S4: absent.  Heart rate: normal    Heart Rhythm: regular     Heart Sounds: S1: normal  S2: normal  S3: absent   S4: absent  Opening Snap: absent    Pericardial Rub:  Absent: .    Abdomen:   Appearance: normal .  Palpation: Soft, non-tender to palpation, bowel sounds positive in all four quadrants; no guarding or rebound tenderness  Extremity: no edema.   LE Skin: no rashes  LE Hair:  normal  LE Pulses: well perfused with normal pulses in the distal extremities  Pallor on elevation: Absent. Rubor on dependency: None      DATA REVIEWED:     EKG. I personally reviewed and interpreted the EKG.      ECG/EMG Results (all)     None        ---------------------------------------------------  TTE/MONICA:  Results for orders placed during the hospital encounter of 08/29/20   Adult Transthoracic Echo Complete W/ Cont if Necessary Per Protocol    Narrative · Normal EF 51-55%.                --------------------------------------------------------------------------------------------------  LABS:     The CVD Risk score (Gisela et al., " 2008) failed to calculate for the following reasons:    The patient has a prior MI, stroke, CHF, or peripheral vascular disease diagnosis         Lab Results   Component Value Date    GLUCOSE 285 (H) 10/08/2020    BUN 13 10/08/2020    CREATININE 0.77 10/08/2020    EGFRIFNONA 111 10/08/2020    EGFRIFAFRI 150 09/17/2019    BCR 16.9 10/08/2020    K 4.1 10/08/2020    CO2 29.0 10/08/2020    CALCIUM 8.9 10/08/2020    ALBUMIN 3.50 10/08/2020    AST 18 10/08/2020    ALT 21 10/08/2020     Lab Results   Component Value Date    WBC 7.55 10/08/2020    HGB 13.3 10/08/2020    HCT 39.9 10/08/2020    MCV 83.3 10/08/2020     10/08/2020     Lab Results   Component Value Date    CHOL 178 08/25/2020    CHLPL 119 12/14/2019    TRIG 309 (H) 08/25/2020    HDL 27 (L) 08/25/2020     (H) 08/25/2020     Lab Results   Component Value Date    TSH 0.60 07/10/2019    M3JDZZQ 147 09/05/2014    E8LFRHE 10.0 09/05/2014     Lab Results   Component Value Date    CKTOTAL 163 11/03/2018    CKMB 150 05/16/2020    CKMBINDEX 0.61 04/01/2020    TROPONINI <0.02 10/07/2020    TROPONINT <0.010 08/30/2020     Lab Results   Component Value Date    HGBA1C 10.6 (H) 08/25/2020     Lab Results   Component Value Date    DDIMER 143 08/24/2020     Lab Results   Component Value Date    ALT 21 10/08/2020     Lab Results   Component Value Date    HGBA1C 10.6 (H) 08/25/2020    HGBA1C 12.2 (H) 07/25/2020    HGBA1C 10.8 (H) 07/24/2020     Lab Results   Component Value Date    MICROALBUR 34.8 07/11/2017    CREATININE 0.77 10/08/2020     Lab Results   Component Value Date    FERRITIN 122 05/16/2020     Lab Results   Component Value Date    INR 1.17 10/08/2020    INR 0.96 (L) 10/07/2020    INR 0.92 (L) 08/23/2020    PROTIME 15.4 (H) 10/08/2020    PROTIME 9.5 (L) 10/07/2020    PROTIME 9.1 (L) 08/23/2020       Assessment/Plan     1.  Coronary artery disease:  3 layers of stent in his mid distal LAD.  Recent cardiac cath in September 2020 was with patent stent with  mild in-stent restenosis.  Continue Effient.  Aspirin was stopped because he has been on Xarelto for his A. Fib  Continue Ranexa  Continue high intensity statin/Cardizem/Imdur/metoprolol XL  Reiterated importance of quitting smoking and weight loss and exercise.    2.  Hypertension:  Well-controlled on current regimen of Cardizem 120 mg, Imdur 120 mg, lisinopril 40 mg, metoprolol XL 50 mg    3.  Hyperlipidemia:  On Lipitor 80 mg LDL still more than 100.  We will add Zetia    4.  PE:  Continue Xarelto    5.  Diabetes:  On insulin.    6.  A. fib:  New diagnosis for him, was admitted in October with A. fib with RVR.  Converted sinus rhythm on his own.  [id number] score is 3.  He is already anticoagulation with Xarelto.  Currently normal sinus continue metoprolol and Cardizem.  Reiterated importance of weight loss.    Prevention:  Patient's Body mass index is 61.93 kg/m². BMI is above normal parameters. Recommendations include: exercise counseling and nutrition counseling.      Yordy Hansen  reports that he quit smoking about 23 years ago. His smoking use included cigarettes. He has a 0.13 pack-year smoking history. His smokeless tobacco use includes snuff.               This document has been electronically signed by Demetri Tejada MD on October 15, 2020 11:36 CDT

## 2020-11-05 DIAGNOSIS — Z79.4 TYPE 2 DIABETES MELLITUS WITH DIABETIC NEUROPATHY, WITH LONG-TERM CURRENT USE OF INSULIN (HCC): ICD-10-CM

## 2020-11-05 DIAGNOSIS — E11.40 TYPE 2 DIABETES MELLITUS WITH DIABETIC NEUROPATHY, WITH LONG-TERM CURRENT USE OF INSULIN (HCC): ICD-10-CM

## 2020-11-05 RX ORDER — GABAPENTIN 300 MG/1
600 CAPSULE ORAL
Qty: 120 CAPSULE | Refills: 0 | Status: SHIPPED | OUTPATIENT
Start: 2020-11-05 | End: 2020-12-01 | Stop reason: SDUPTHER

## 2020-12-01 DIAGNOSIS — E11.40 TYPE 2 DIABETES MELLITUS WITH DIABETIC NEUROPATHY, WITH LONG-TERM CURRENT USE OF INSULIN (HCC): ICD-10-CM

## 2020-12-01 DIAGNOSIS — Z79.4 TYPE 2 DIABETES MELLITUS WITH DIABETIC NEUROPATHY, WITH LONG-TERM CURRENT USE OF INSULIN (HCC): ICD-10-CM

## 2020-12-01 RX ORDER — GABAPENTIN 300 MG/1
600 CAPSULE ORAL
Qty: 120 CAPSULE | Refills: 0 | Status: SHIPPED | OUTPATIENT
Start: 2020-12-01 | End: 2021-01-08 | Stop reason: SDUPTHER

## 2020-12-12 ENCOUNTER — HOSPITAL ENCOUNTER (OUTPATIENT)
Facility: HOSPITAL | Age: 40
Setting detail: OBSERVATION
Discharge: HOME | End: 2020-12-13
Attending: FAMILY MEDICINE | Admitting: FAMILY MEDICINE

## 2020-12-12 PROBLEM — R07.9 CHEST PAIN: Status: ACTIVE | Noted: 2020-12-12

## 2020-12-12 LAB
GLUCOSE BLDC GLUCOMTR-MCNC: 295 MG/DL (ref 70–130)
GLUCOSE BLDC GLUCOMTR-MCNC: 308 MG/DL (ref 70–130)
HOLD SPECIMEN: NORMAL
SARS-COV-2 N GENE RESP QL NAA+PROBE: NOT DETECTED
TROPONIN T SERPL-MCNC: <0.01 NG/ML (ref 0–0.03)
TROPONIN T SERPL-MCNC: <0.01 NG/ML (ref 0–0.03)
WHOLE BLOOD HOLD SPECIMEN: NORMAL
WHOLE BLOOD HOLD SPECIMEN: NORMAL

## 2020-12-12 PROCEDURE — 63710000001 INSULIN ASPART PER 5 UNITS: Performed by: FAMILY MEDICINE

## 2020-12-12 PROCEDURE — G0378 HOSPITAL OBSERVATION PER HR: HCPCS

## 2020-12-12 PROCEDURE — 94660 CPAP INITIATION&MGMT: CPT

## 2020-12-12 PROCEDURE — 93005 ELECTROCARDIOGRAM TRACING: CPT | Performed by: FAMILY MEDICINE

## 2020-12-12 PROCEDURE — 87635 SARS-COV-2 COVID-19 AMP PRB: CPT | Performed by: FAMILY MEDICINE

## 2020-12-12 PROCEDURE — 63710000001 INSULIN DETEMIR PER 5 UNITS: Performed by: FAMILY MEDICINE

## 2020-12-12 PROCEDURE — 93010 ELECTROCARDIOGRAM REPORT: CPT | Performed by: INTERNAL MEDICINE

## 2020-12-12 PROCEDURE — 84484 ASSAY OF TROPONIN QUANT: CPT | Performed by: FAMILY MEDICINE

## 2020-12-12 PROCEDURE — C9803 HOPD COVID-19 SPEC COLLECT: HCPCS

## 2020-12-12 PROCEDURE — 82962 GLUCOSE BLOOD TEST: CPT

## 2020-12-12 PROCEDURE — 94799 UNLISTED PULMONARY SVC/PX: CPT

## 2020-12-12 RX ORDER — NITROGLYCERIN 0.4 MG/1
0.4 TABLET SUBLINGUAL
Status: DISCONTINUED | OUTPATIENT
Start: 2020-12-12 | End: 2020-12-13 | Stop reason: HOSPADM

## 2020-12-12 RX ORDER — GABAPENTIN 300 MG/1
600 CAPSULE ORAL 2 TIMES DAILY
Status: DISCONTINUED | OUTPATIENT
Start: 2020-12-12 | End: 2020-12-13 | Stop reason: HOSPADM

## 2020-12-12 RX ORDER — PRAMIPEXOLE DIHYDROCHLORIDE 1 MG/1
2 TABLET ORAL NIGHTLY
Status: DISCONTINUED | OUTPATIENT
Start: 2020-12-12 | End: 2020-12-13 | Stop reason: HOSPADM

## 2020-12-12 RX ORDER — SODIUM CHLORIDE 0.9 % (FLUSH) 0.9 %
10 SYRINGE (ML) INJECTION AS NEEDED
Status: DISCONTINUED | OUTPATIENT
Start: 2020-12-12 | End: 2020-12-13 | Stop reason: HOSPADM

## 2020-12-12 RX ORDER — RANOLAZINE 500 MG/1
1000 TABLET, EXTENDED RELEASE ORAL EVERY 12 HOURS SCHEDULED
Status: DISCONTINUED | OUTPATIENT
Start: 2020-12-12 | End: 2020-12-13 | Stop reason: HOSPADM

## 2020-12-12 RX ORDER — TRAZODONE HYDROCHLORIDE 150 MG/1
300 TABLET ORAL NIGHTLY
Status: DISCONTINUED | OUTPATIENT
Start: 2020-12-12 | End: 2020-12-13 | Stop reason: HOSPADM

## 2020-12-12 RX ORDER — CALCIUM CARBONATE 200(500)MG
2 TABLET,CHEWABLE ORAL 2 TIMES DAILY PRN
Status: DISCONTINUED | OUTPATIENT
Start: 2020-12-12 | End: 2020-12-13 | Stop reason: HOSPADM

## 2020-12-12 RX ORDER — DILTIAZEM HYDROCHLORIDE 120 MG/1
120 CAPSULE, COATED, EXTENDED RELEASE ORAL DAILY
Status: DISCONTINUED | OUTPATIENT
Start: 2020-12-12 | End: 2020-12-13 | Stop reason: HOSPADM

## 2020-12-12 RX ORDER — ONDANSETRON 2 MG/ML
4 INJECTION INTRAMUSCULAR; INTRAVENOUS EVERY 6 HOURS PRN
Status: DISCONTINUED | OUTPATIENT
Start: 2020-12-12 | End: 2020-12-13 | Stop reason: HOSPADM

## 2020-12-12 RX ORDER — ACETAMINOPHEN 160 MG/5ML
650 SOLUTION ORAL EVERY 4 HOURS PRN
Status: DISCONTINUED | OUTPATIENT
Start: 2020-12-12 | End: 2020-12-13 | Stop reason: HOSPADM

## 2020-12-12 RX ORDER — ATORVASTATIN CALCIUM 40 MG/1
80 TABLET, FILM COATED ORAL NIGHTLY
Status: DISCONTINUED | OUTPATIENT
Start: 2020-12-12 | End: 2020-12-13 | Stop reason: HOSPADM

## 2020-12-12 RX ORDER — ACETAMINOPHEN 325 MG/1
650 TABLET ORAL EVERY 4 HOURS PRN
Status: DISCONTINUED | OUTPATIENT
Start: 2020-12-12 | End: 2020-12-13 | Stop reason: HOSPADM

## 2020-12-12 RX ORDER — SODIUM CHLORIDE 0.9 % (FLUSH) 0.9 %
10 SYRINGE (ML) INJECTION EVERY 12 HOURS SCHEDULED
Status: DISCONTINUED | OUTPATIENT
Start: 2020-12-12 | End: 2020-12-13 | Stop reason: HOSPADM

## 2020-12-12 RX ORDER — ONDANSETRON 4 MG/1
4 TABLET, FILM COATED ORAL EVERY 6 HOURS PRN
Status: DISCONTINUED | OUTPATIENT
Start: 2020-12-12 | End: 2020-12-13 | Stop reason: HOSPADM

## 2020-12-12 RX ORDER — ALUMINA, MAGNESIA, AND SIMETHICONE 2400; 2400; 240 MG/30ML; MG/30ML; MG/30ML
15 SUSPENSION ORAL ONCE
Status: COMPLETED | OUTPATIENT
Start: 2020-12-12 | End: 2020-12-12

## 2020-12-12 RX ORDER — LIDOCAINE HYDROCHLORIDE 20 MG/ML
15 SOLUTION OROPHARYNGEAL ONCE
Status: COMPLETED | OUTPATIENT
Start: 2020-12-12 | End: 2020-12-12

## 2020-12-12 RX ORDER — DEXTROSE MONOHYDRATE 25 G/50ML
25 INJECTION, SOLUTION INTRAVENOUS
Status: DISCONTINUED | OUTPATIENT
Start: 2020-12-12 | End: 2020-12-13 | Stop reason: HOSPADM

## 2020-12-12 RX ORDER — NICOTINE POLACRILEX 4 MG
15 LOZENGE BUCCAL
Status: DISCONTINUED | OUTPATIENT
Start: 2020-12-12 | End: 2020-12-13 | Stop reason: HOSPADM

## 2020-12-12 RX ORDER — ACETAMINOPHEN 650 MG/1
650 SUPPOSITORY RECTAL EVERY 4 HOURS PRN
Status: DISCONTINUED | OUTPATIENT
Start: 2020-12-12 | End: 2020-12-13 | Stop reason: HOSPADM

## 2020-12-12 RX ORDER — LISINOPRIL 40 MG/1
40 TABLET ORAL NIGHTLY
Status: DISCONTINUED | OUTPATIENT
Start: 2020-12-12 | End: 2020-12-13 | Stop reason: HOSPADM

## 2020-12-12 RX ORDER — METOPROLOL SUCCINATE 50 MG/1
50 TABLET, EXTENDED RELEASE ORAL DAILY
Status: DISCONTINUED | OUTPATIENT
Start: 2020-12-13 | End: 2020-12-13 | Stop reason: HOSPADM

## 2020-12-12 RX ORDER — PRASUGREL 10 MG/1
10 TABLET, FILM COATED ORAL DAILY
Status: DISCONTINUED | OUTPATIENT
Start: 2020-12-13 | End: 2020-12-13 | Stop reason: HOSPADM

## 2020-12-12 RX ORDER — ISOSORBIDE MONONITRATE 60 MG/1
120 TABLET, EXTENDED RELEASE ORAL EVERY MORNING
Status: DISCONTINUED | OUTPATIENT
Start: 2020-12-13 | End: 2020-12-13 | Stop reason: HOSPADM

## 2020-12-12 RX ORDER — ALBUTEROL SULFATE 2.5 MG/3ML
2.5 SOLUTION RESPIRATORY (INHALATION) EVERY 6 HOURS PRN
Status: DISCONTINUED | OUTPATIENT
Start: 2020-12-12 | End: 2020-12-13 | Stop reason: HOSPADM

## 2020-12-12 RX ORDER — PANTOPRAZOLE SODIUM 40 MG/1
40 TABLET, DELAYED RELEASE ORAL NIGHTLY
Status: DISCONTINUED | OUTPATIENT
Start: 2020-12-12 | End: 2020-12-13 | Stop reason: HOSPADM

## 2020-12-12 RX ADMIN — LISINOPRIL 40 MG: 40 TABLET ORAL at 20:20

## 2020-12-12 RX ADMIN — ALUMINUM HYDROXIDE, MAGNESIUM HYDROXIDE, AND DIMETHICONE 15 ML: 400; 400; 40 SUSPENSION ORAL at 18:01

## 2020-12-12 RX ADMIN — CALCIUM CARBONATE (ANTACID) CHEW TAB 500 MG 2 TABLET: 500 CHEW TAB at 19:26

## 2020-12-12 RX ADMIN — RANOLAZINE 1000 MG: 500 TABLET, FILM COATED, EXTENDED RELEASE ORAL at 20:20

## 2020-12-12 RX ADMIN — DILTIAZEM HYDROCHLORIDE 120 MG: 120 CAPSULE, COATED, EXTENDED RELEASE ORAL at 18:02

## 2020-12-12 RX ADMIN — PRAMIPEXOLE DIHYDROCHLORIDE 2 MG: 1 TABLET ORAL at 20:19

## 2020-12-12 RX ADMIN — INSULIN DETEMIR 50 UNITS: 100 INJECTION, SOLUTION SUBCUTANEOUS at 20:20

## 2020-12-12 RX ADMIN — ACETAMINOPHEN 650 MG: 325 TABLET, FILM COATED ORAL at 19:26

## 2020-12-12 RX ADMIN — RIVAROXABAN 20 MG: 10 TABLET, FILM COATED ORAL at 18:02

## 2020-12-12 RX ADMIN — TRAZODONE HYDROCHLORIDE 300 MG: 150 TABLET ORAL at 20:20

## 2020-12-12 RX ADMIN — PANTOPRAZOLE SODIUM 40 MG: 40 TABLET, DELAYED RELEASE ORAL at 20:20

## 2020-12-12 RX ADMIN — ATORVASTATIN CALCIUM 80 MG: 40 TABLET, FILM COATED ORAL at 20:19

## 2020-12-12 RX ADMIN — LIDOCAINE HYDROCHLORIDE 15 ML: 20 SOLUTION ORAL; TOPICAL at 18:01

## 2020-12-12 RX ADMIN — INSULIN ASPART 12 UNITS: 100 INJECTION, SOLUTION INTRAVENOUS; SUBCUTANEOUS at 18:02

## 2020-12-12 RX ADMIN — GABAPENTIN 600 MG: 300 CAPSULE ORAL at 20:20

## 2020-12-12 NOTE — H&P
[facility]      Date of Admission: 12/12/2020      Primary Care Physician: Mami Perez APRN      Chief Complaint: Chest pain    HPI: Patient is a 40-year-old male with past medical history notable for CAD status post stenting, paroxysmal atrial fibrillation, type 2 diabetes mellitus, fatty liver, GERD, and hypertension who presented to an outside hospital emergency department due to centralized chest pain with radiation to his left arm.  He denies any nausea, vomiting, or diaphoresis with this.  He states that he was driving his vehicle when this occurred.  He is not having any shortness of breath at this time but notes he had some with this.  He also has a significant history of having Covid earlier this year back in April.  He states that he has not had any problems with headache or other concerns at this time.  He denies any current chest pain right now.  He does have Nitropaste on his chest currently.  His evaluation at the outside hospital was negative for any acute ST or T wave changes on his EKG per the ER doctor report there and he also had a negative troponin.  Transfer was requested as his cardiologist is Dr. Tejada.     Concurrent Medical History:  has a past medical history of CAD (coronary artery disease), Chronic back pain, Chronic pulmonary embolism (CMS/HCC), Coronary artery disease, Diabetes mellitus (CMS/HCC), Factor II deficiency (CMS/HCC), Fatty liver, GERD (gastroesophageal reflux disease), Hyperlipidemia, Hypertension, Morbid obesity (CMS/HCC), RLS (restless legs syndrome), Seizures (CMS/HCC), and Sleep apnea.    Past Surgical History:  has a past surgical history that includes transesophageal echocardiogram (travis); pr rt/lt heart catheters (N/A, 3/15/2017); Cardiac catheterization (N/A, 3/15/2017); Cardiac catheterization (N/A, 3/15/2017); Coronary angioplasty with stent; Cardiac catheterization (N/A, 3/1/2018);  Cholecystectomy; and Cardiac catheterization (N/A, 9/2/2020).    Family History: family history includes Cancer in his paternal grandfather; Heart disease in his mother; Obesity in his mother; Sleep apnea in his father.     Social History:  reports that he quit smoking about 23 years ago. His smoking use included cigarettes. He has a 0.13 pack-year smoking history. His smokeless tobacco use includes snuff. He reports that he does not drink alcohol or use drugs.    Allergies:   Allergies   Allergen Reactions   • Glipizide Other (See Comments) and Unknown (See Comments)     Slurred Speech  Slurred Speech  Hallucinations, Slurred Speech   • Reglan [Metoclopramide] Anxiety   • Tramadol Other (See Comments)     seizures   • Risperidone Other (See Comments)     Slurred speech  Can't stand, trouble breathing, slurred speech         Medications:   Prior to Admission medications    Medication Sig Start Date End Date Taking? Authorizing Provider   albuterol (ACCUNEB) 1.25 MG/3ML nebulizer solution Take 3 mL by nebulization Every 6 (Six) Hours As Needed for Wheezing. 11/15/17   Tahmina Reid APRN   albuterol (PROVENTIL HFA;VENTOLIN HFA) 108 (90 BASE) MCG/ACT inhaler Inhale 2 puffs Every 4 (Four) Hours As Needed for Wheezing.    Provider, MD Latrice   atorvastatin (LIPITOR) 80 MG tablet Take 1 tablet by mouth Every Night. 9/29/20   Mami Perez APRN   clindamycin (CLEOCIN) 300 MG capsule Take 1 capsule by mouth 3 (Three) Times a Day. 10/14/20   Ifeoma Goel, PACarolC   dilTIAZem CD (Cardizem CD) 120 MG 24 hr capsule Take 1 capsule by mouth Daily. 10/8/20   Judy Crews MD   fenofibrate micronized (LOFIBRA) 134 MG capsule Take 1 capsule by mouth Every Morning Before Breakfast. 9/29/20   Mami Perez APRN   gabapentin (NEURONTIN) 300 MG capsule Take 2 capsules by mouth 2 (Two) Times a Day. 12/1/20   Mami Perez APRN   glucose monitor monitoring kit 1 each As Needed (check blood glucose  "3x daily). 9/29/20   Mami Perez APRN   insulin glargine (LANTUS) 100 UNIT/ML injection Inject 60 Units under the skin into the appropriate area as directed Every Night. 9/29/20   Mami Perez APRN   insulin lispro (HumaLOG) 100 UNIT/ML injection Inject 15 Units under the skin into the appropriate area as directed 3 (Three) Times a Day Before Meals. 9/29/20   Mami Perez APRN   ipratropium (ATROVENT HFA) 17 MCG/ACT inhaler Inhale 2 puffs. 4/8/20 4/9/21  Provider, MD Latrice   isosorbide mononitrate (IMDUR) 120 MG 24 hr tablet Take 1 tablet by mouth Every Morning for 30 days. 10/10/17 9/29/21  Minesh Gregg MD   lisinopril (PRINIVIL,ZESTRIL) 40 MG tablet Take 1 tablet by mouth Every Night. 9/29/20   Mami Perez APRN   methocarbamol (Robaxin) 500 MG tablet Take 1 tablet by mouth 4 (Four) Times a Day. 9/29/20   Mami Perez APRN   metoprolol succinate XL (TOPROL-XL) 50 MG 24 hr tablet Take 1 tablet by mouth Daily. 9/29/20   Mami Perez APRN   Microlet Lancets misc One touch delica lancets 9/29/20   Mami Perez APRN   nitroglycerin (NITROSTAT) 0.4 MG SL tablet Place 1 tablet under the tongue Every 5 (Five) Minutes As Needed for Chest Pain for up to 15 days. 10/10/17 9/29/21  Minesh Gregg MD   pantoprazole (PROTONIX) 40 MG EC tablet Take 1 tablet by mouth Every Night. 9/29/20   Mami Perez APRN   pramipexole (MIRAPEX) 1 MG tablet Take 2 tablets by mouth Every Night. 9/29/20   Mami Perez APRN   prasugrel (EFFIENT) 10 MG tablet Take 1 tablet by mouth Daily. 9/29/20   Mami Perez APRN   ranolazine (RANEXA) 1000 MG 12 hr tablet Take 1 tablet by mouth Every 12 (Twelve) Hours. 11/10/17   Earnest Perez MD   ReliOn Insulin Syringe 31G X 15/64\" 0.5 ML misc For use with insulin 9/29/20   Mami Perez APRN   rivaroxaban (XARELTO) 20 MG tablet Take 1 tablet by mouth Daily With Dinner. " 9/29/20   Mami Perez APRN   traZODone (DESYREL) 300 MG tablet Take 1 tablet by mouth every night at bedtime. 9/29/20   Mami Perez APRN       Review of Systems:  Review of Systems   Constitutional: Negative for chills and fever.   HENT: Negative for congestion.    Respiratory: Negative for shortness of breath.    Cardiovascular: Positive for chest pain.   Gastrointestinal: Negative for abdominal pain, nausea and vomiting.   Genitourinary: Negative.    Musculoskeletal: Negative.    Skin: Negative for rash.   Neurological: Negative.    Psychiatric/Behavioral: Negative.    All other systems reviewed and are negative.     Otherwise complete ROS is negative except as mentioned above.    Physical Exam:   Temp:  [98 °F (36.7 °C)] 98 °F (36.7 °C)  Heart Rate:  [77] 77  Resp:  [20] 20  BP: (162)/(84) 162/84  Physical Exam  Constitutional:       General: He is not in acute distress.     Appearance: He is not toxic-appearing.   HENT:      Head: Normocephalic and atraumatic.      Right Ear: External ear normal.      Left Ear: External ear normal.      Nose: Nose normal.      Mouth/Throat:      Mouth: Mucous membranes are moist.      Pharynx: Oropharynx is clear.   Eyes:      Conjunctiva/sclera: Conjunctivae normal.   Neck:      Musculoskeletal: Neck supple.   Cardiovascular:      Rate and Rhythm: Normal rate and regular rhythm.      Pulses: Normal pulses.      Heart sounds: Normal heart sounds.   Pulmonary:      Effort: Pulmonary effort is normal. No respiratory distress.      Breath sounds: Normal breath sounds.   Abdominal:      General: Bowel sounds are normal.      Palpations: Abdomen is soft.      Tenderness: There is no abdominal tenderness.   Musculoskeletal:         General: No swelling.   Skin:     General: Skin is warm and dry.      Capillary Refill: Capillary refill takes less than 2 seconds.   Neurological:      General: No focal deficit present.      Mental Status: He is alert and oriented to  person, place, and time. Mental status is at baseline.      Coordination: Coordination normal.   Psychiatric:         Mood and Affect: Mood normal.         Behavior: Behavior normal.         Results Reviewed:  I have personally reviewed current lab, radiology, and data and agree with results.  Lab Results (last 24 hours)     Procedure Component Value Units Date/Time    Extra Tubes [[id number]] Collected: 12/12/20 1755    Specimen: Blood, Venous Line Updated: 12/12/20 1755    Narrative:      The following orders were created for panel order Extra Tubes.  Procedure                               Abnormality         Status                     ---------                               -----------         ------                     Light Blue Top[[id number]]                                   In process                 Lavender Top[[id number]]                                     In process                 Gold Top - SST[[id number]]                                   In process                   Please view results for these tests on the individual orders.    Lavender Top [[id number]] Collected: 12/12/20 1755    Specimen: Blood Updated: 12/12/20 1755    Gold Top - SST [[id number]] Collected: 12/12/20 1755    Specimen: Blood Updated: 12/12/20 1755    Light Blue Top [[id number]] Collected: 12/12/20 1755    Specimen: Blood Updated: 12/12/20 1755    Troponin [[id number]] Collected: 12/12/20 1749    Specimen: Blood Updated: 12/12/20 1754    POC Glucose Once [[id number]]  (Abnormal) Collected: 12/12/20 1624    Specimen: Blood Updated: 12/12/20 1643     Glucose 295 mg/dL      Comment: RN NotifiedOperator: [id number] QUYNH NIEVESBanner Casa Grande Medical CenterMeter ID: [id number]           Imaging Results (Last 24 Hours)     ** No results found for the last 24 hours. **            Assessment:    Active Hospital Problems    Diagnosis   • Chest pain   • Coronary artery disease involving native coronary artery of native heart without angina pectoris   • Type 2 diabetes  mellitus without complication, with long-term current use of insulin (CMS/[facility])   • Essential hypertension             Plan:  -We will admit the patient for observation  -We will trend troponins and monitor on telemetry  -Serial EKGs ordered  -If his symptoms worsen or his troponin becomes elevated we will consult cardiology at that time  -Will give GI cocktail.  -Accu-Cheks and insulin orders in place.  -Continue home medications as appropriate  -Outside hospital labs reviewed  -DVT prophylaxis with Xarelto  -CODE STATUS: Full    The patient was evaluated during the global COVID-19 pandemic, and the diagnosis was suspected/considered upon their initial presentation.  Evaluation, treatment, and testing were consistent with current guidelines for patients who present with complaints or symptoms that may be related to COVID-19.      I discussed the patient's findings and my recommendations with: The patient and nursing    Victor Hugo Gordon MD

## 2020-12-13 ENCOUNTER — READMISSION MANAGEMENT (OUTPATIENT)
Dept: CALL CENTER | Facility: HOSPITAL | Age: 40
End: 2020-12-13

## 2020-12-13 VITALS
OXYGEN SATURATION: 96 % | TEMPERATURE: 97.4 F | RESPIRATION RATE: 19 BRPM | SYSTOLIC BLOOD PRESSURE: 135 MMHG | HEIGHT: 71 IN | WEIGHT: 315 LBS | BODY MASS INDEX: 44.1 KG/M2 | HEART RATE: 63 BPM | DIASTOLIC BLOOD PRESSURE: 70 MMHG

## 2020-12-13 LAB
ALBUMIN SERPL-MCNC: 3.4 G/DL (ref 3.5–5.2)
ALBUMIN/GLOB SERPL: 0.9 G/DL
ALP SERPL-CCNC: 85 U/L (ref 39–117)
ALT SERPL W P-5'-P-CCNC: 26 U/L (ref 1–41)
ANION GAP SERPL CALCULATED.3IONS-SCNC: 11 MMOL/L (ref 5–15)
AST SERPL-CCNC: 25 U/L (ref 1–40)
BASOPHILS # BLD AUTO: 0.05 10*3/MM3 (ref 0–0.2)
BASOPHILS NFR BLD AUTO: 0.8 % (ref 0–1.5)
BILIRUB SERPL-MCNC: 0.6 MG/DL (ref 0–1.2)
BUN SERPL-MCNC: 11 MG/DL (ref 6–20)
BUN/CREAT SERPL: 15.1 (ref 7–25)
CALCIUM SPEC-SCNC: 8.8 MG/DL (ref 8.6–10.5)
CHLORIDE SERPL-SCNC: 95 MMOL/L (ref 98–107)
CO2 SERPL-SCNC: 25 MMOL/L (ref 22–29)
CREAT SERPL-MCNC: 0.73 MG/DL (ref 0.76–1.27)
DEPRECATED RDW RBC AUTO: 41.9 FL (ref 37–54)
EOSINOPHIL # BLD AUTO: 0.27 10*3/MM3 (ref 0–0.4)
EOSINOPHIL NFR BLD AUTO: 4.1 % (ref 0.3–6.2)
ERYTHROCYTE [DISTWIDTH] IN BLOOD BY AUTOMATED COUNT: 13.9 % (ref 12.3–15.4)
GFR SERPL CREATININE-BSD FRML MDRD: 118 ML/MIN/1.73
GLOBULIN UR ELPH-MCNC: 3.6 GM/DL
GLUCOSE BLDC GLUCOMTR-MCNC: 251 MG/DL (ref 70–130)
GLUCOSE BLDC GLUCOMTR-MCNC: 312 MG/DL (ref 70–130)
GLUCOSE SERPL-MCNC: 289 MG/DL (ref 65–99)
HCT VFR BLD AUTO: 40.9 % (ref 37.5–51)
HGB BLD-MCNC: 14 G/DL (ref 13–17.7)
IMM GRANULOCYTES # BLD AUTO: 0.09 10*3/MM3 (ref 0–0.05)
IMM GRANULOCYTES NFR BLD AUTO: 1.4 % (ref 0–0.5)
LYMPHOCYTES # BLD AUTO: 2.07 10*3/MM3 (ref 0.7–3.1)
LYMPHOCYTES NFR BLD AUTO: 31.6 % (ref 19.6–45.3)
MCH RBC QN AUTO: 28.6 PG (ref 26.6–33)
MCHC RBC AUTO-ENTMCNC: 34.2 G/DL (ref 31.5–35.7)
MCV RBC AUTO: 83.5 FL (ref 79–97)
MONOCYTES # BLD AUTO: 0.54 10*3/MM3 (ref 0.1–0.9)
MONOCYTES NFR BLD AUTO: 8.2 % (ref 5–12)
NEUTROPHILS NFR BLD AUTO: 3.54 10*3/MM3 (ref 1.7–7)
NEUTROPHILS NFR BLD AUTO: 53.9 % (ref 42.7–76)
NRBC BLD AUTO-RTO: 0 /100 WBC (ref 0–0.2)
PLATELET # BLD AUTO: 211 10*3/MM3 (ref 140–450)
PMV BLD AUTO: 9.1 FL (ref 6–12)
POTASSIUM SERPL-SCNC: 3.9 MMOL/L (ref 3.5–5.2)
PROT SERPL-MCNC: 7 G/DL (ref 6–8.5)
QT INTERVAL: 406 MS
QTC INTERVAL: 471 MS
RBC # BLD AUTO: 4.9 10*6/MM3 (ref 4.14–5.8)
SODIUM SERPL-SCNC: 131 MMOL/L (ref 136–145)
WBC # BLD AUTO: 6.56 10*3/MM3 (ref 3.4–10.8)

## 2020-12-13 PROCEDURE — G0378 HOSPITAL OBSERVATION PER HR: HCPCS

## 2020-12-13 PROCEDURE — 94799 UNLISTED PULMONARY SVC/PX: CPT

## 2020-12-13 PROCEDURE — 63710000001 INSULIN ASPART PER 5 UNITS: Performed by: FAMILY MEDICINE

## 2020-12-13 PROCEDURE — 25010000002 MORPHINE PER 10 MG: Performed by: HOSPITALIST

## 2020-12-13 PROCEDURE — 82962 GLUCOSE BLOOD TEST: CPT

## 2020-12-13 PROCEDURE — 96374 THER/PROPH/DIAG INJ IV PUSH: CPT

## 2020-12-13 PROCEDURE — 85025 COMPLETE CBC W/AUTO DIFF WBC: CPT | Performed by: FAMILY MEDICINE

## 2020-12-13 PROCEDURE — 80053 COMPREHEN METABOLIC PANEL: CPT | Performed by: FAMILY MEDICINE

## 2020-12-13 RX ORDER — MORPHINE SULFATE 2 MG/ML
1 INJECTION, SOLUTION INTRAMUSCULAR; INTRAVENOUS ONCE
Status: COMPLETED | OUTPATIENT
Start: 2020-12-13 | End: 2020-12-13

## 2020-12-13 RX ADMIN — NITROGLYCERIN 0.4 MG: 0.4 TABLET SUBLINGUAL at 02:01

## 2020-12-13 RX ADMIN — NITROGLYCERIN 0.4 MG: 0.4 TABLET SUBLINGUAL at 01:56

## 2020-12-13 RX ADMIN — GABAPENTIN 600 MG: 300 CAPSULE ORAL at 08:34

## 2020-12-13 RX ADMIN — INSULIN ASPART 12 UNITS: 100 INJECTION, SOLUTION INTRAVENOUS; SUBCUTANEOUS at 08:33

## 2020-12-13 RX ADMIN — ISOSORBIDE MONONITRATE 120 MG: 60 TABLET, EXTENDED RELEASE ORAL at 06:24

## 2020-12-13 RX ADMIN — PRASUGREL 10 MG: 10 TABLET, FILM COATED ORAL at 08:34

## 2020-12-13 RX ADMIN — NITROGLYCERIN 0.4 MG: 0.4 TABLET SUBLINGUAL at 01:51

## 2020-12-13 RX ADMIN — RANOLAZINE 1000 MG: 500 TABLET, FILM COATED, EXTENDED RELEASE ORAL at 08:34

## 2020-12-13 RX ADMIN — METOPROLOL SUCCINATE 50 MG: 50 TABLET, EXTENDED RELEASE ORAL at 08:34

## 2020-12-13 RX ADMIN — INSULIN ASPART 16 UNITS: 100 INJECTION, SOLUTION INTRAVENOUS; SUBCUTANEOUS at 10:59

## 2020-12-13 RX ADMIN — DILTIAZEM HYDROCHLORIDE 120 MG: 120 CAPSULE, COATED, EXTENDED RELEASE ORAL at 08:34

## 2020-12-13 RX ADMIN — MORPHINE SULFATE 1 MG: 2 INJECTION, SOLUTION INTRAMUSCULAR; INTRAVENOUS at 02:38

## 2020-12-13 RX ADMIN — ACETAMINOPHEN 650 MG: 325 TABLET, FILM COATED ORAL at 01:51

## 2020-12-13 NOTE — OUTREACH NOTE
Prep Survey      Responses   [facility] facility patient discharged from?  [facility]   Is LACE score < 7 ?  Yes   Eligibility  [facility]   Date of Admission  12/12/20   Date of Discharge  12/13/20   Discharge Disposition  Home or Self Care   Discharge diagnosis  chest pain, T2DM   Does the patient have one of the following disease processes/diagnoses(primary or secondary)?  Other   Does the patient have Home health ordered?  No   Is there a DME ordered?  No   Prep survey completed?  Yes          Fe Sales RN

## 2020-12-13 NOTE — DISCHARGE SUMMARY
DISCHARGE SUMMARY    NAME: Yordy Hansen   PHYSICIAN: Yared Mcnulty MD  : 1980  MRN: [id number]    ADMITTED: 2020   DISCHARGED:  20    ADMISSION DIAGNOSES:   Present on Admission:  • Chest pain  • Coronary artery disease involving native coronary artery of native heart without angina pectoris  • Essential hypertension    DISCHARGE DIAGNOSES:     Essential hypertension    Type 2 diabetes mellitus without complication, with long-term current use of insulin (CMS/[facility])    Coronary artery disease involving native coronary artery of native heart without angina pectoris    Chest pain      SERVICE: Medicine. Attending  Yared Mcnulty MD    CONSULTS:   Consult Orders (all) (From admission, onward)    None          PROCEDURES:   Imaging Results (Last 7 Days)     ** No results found for the last 168 hours. **          HISTORY OF PRESENT ILLNESS: *Copied from Victor Hugo Gordon MD's H&P*  Patient is a 40-year-old male with past medical history notable for CAD status post stenting, paroxysmal atrial fibrillation, type 2 diabetes mellitus, fatty liver, GERD, and hypertension who presented to an outside hospital emergency department due to centralized chest pain with radiation to his left arm.  He denies any nausea, vomiting, or diaphoresis with this.  He states that he was driving his vehicle when this occurred.  He is not having any shortness of breath at this time but notes he had some with this.  He also has a significant history of having Covid earlier this year back in April.  He states that he has not had any problems with headache or other concerns at this time.  He denies any current chest pain right now.  He does have Nitropaste on his chest currently.  His evaluation at the outside hospital was negative for any acute ST or T wave changes on his EKG per the ER doctor report there and he also had a negative troponin.  Transfer was requested as his cardiologist is Dr. Tejada.     DIAGNOSTIC DATA:   Lab  Results (last 7 days)     Procedure Component Value Units Date/Time    CBC Auto Differential [[id number]]  (Abnormal) Collected: 12/13/20 0553    Specimen: Blood Updated: 12/13/20 0720     WBC 6.56 10*3/mm3      RBC 4.90 10*6/mm3      Hemoglobin 14.0 g/dL      Hematocrit 40.9 %      MCV 83.5 fL      MCH 28.6 pg      MCHC 34.2 g/dL      RDW 13.9 %      RDW-SD 41.9 fl      MPV 9.1 fL      Platelets 211 10*3/mm3      Neutrophil % 53.9 %      Lymphocyte % 31.6 %      Monocyte % 8.2 %      Eosinophil % 4.1 %      Basophil % 0.8 %      Immature Grans % 1.4 %      Neutrophils, Absolute 3.54 10*3/mm3      Lymphocytes, Absolute 2.07 10*3/mm3      Monocytes, Absolute 0.54 10*3/mm3      Eosinophils, Absolute 0.27 10*3/mm3      Basophils, Absolute 0.05 10*3/mm3      Immature Grans, Absolute 0.09 10*3/mm3      nRBC 0.0 /100 WBC     Comprehensive Metabolic Panel [[id number]]  (Abnormal) Collected: 12/13/20 0553    Specimen: Blood Updated: 12/13/20 0720     Glucose 289 mg/dL      BUN 11 mg/dL      Creatinine 0.73 mg/dL      Sodium 131 mmol/L      Potassium 3.9 mmol/L      Chloride 95 mmol/L      CO2 25.0 mmol/L      Calcium 8.8 mg/dL      Total Protein 7.0 g/dL      Albumin 3.40 g/dL      ALT (SGPT) 26 U/L      AST (SGOT) 25 U/L      Alkaline Phosphatase 85 U/L      Total Bilirubin 0.6 mg/dL      eGFR Non African Amer 118 mL/min/1.73      Globulin 3.6 gm/dL      A/G Ratio 0.9 g/dL      BUN/Creatinine Ratio 15.1     Anion Gap 11.0 mmol/L     Narrative:      GFR Normal >60  Chronic Kidney Disease <60  Kidney Failure <15      POC Glucose Once [[id number]]  (Abnormal) Collected: 12/13/20 0626    Specimen: Blood Updated: 12/13/20 0643     Glucose 251 mg/dL      Comment: RN NotifiedOperator: [id number] ORTEGA Ly ID: [id number]       COVID-19, [facility] IN-HOUSE, NP SWAB IN TRANSPORT MEDIA 8-10 HR TAT - Swab, Nasopharynx [[id number]]  (Normal) Collected: 12/12/20 1814    Specimen: Swab from Nasopharynx Updated: 12/12/20 8428      COVID19 Not Detected    Narrative:      Testing performed by Real Time RT-PCR  This test has not been approved by the [facility] but is authorized under the Emergency Use Act (EUA)    Fact sheet for providers: https://www.fda.gov/media/697947/download    Fact sheet for patients: https://www.fda.gov/media/982520/download        POC Glucose Once [[id number]]  (Abnormal) Collected: 12/12/20 2012    Specimen: Blood Updated: 12/12/20 2048     Glucose 308 mg/dL      Comment: RN NotifiedOperator: [id number] ORTEGA Minerer ID: [id number]       Troponin [[id number]]  (Normal) Collected: 12/12/20 1935    Specimen: Blood Updated: 12/12/20 2020     Troponin T <0.010 ng/mL     Narrative:      Troponin T Reference Range:  <= 0.03 ng/mL-   Negative for AMI  >0.03 ng/mL-     Abnormal for myocardial necrosis.  Clinicians would have to utilize clinical acumen, EKG, Troponin and serial changes to determine if it is an Acute Myocardial Infarction or myocardial injury due to an underlying chronic condition.       Results may be falsely decreased if patient taking Biotin.      Extra Tubes [[id number]] Collected: 12/12/20 1755    Specimen: Blood, Venous Line Updated: 12/12/20 1900    Narrative:      The following orders were created for panel order Extra Tubes.  Procedure                               Abnormality         Status                     ---------                               -----------         ------                     Light Blue Top[[id number]]                                   Final result               Lavender Top[[id number]]                                     Final result               Gold Top - SST[[id number]]                                   Final result                 Please view results for these tests on the individual orders.    Light Blue Top [[id number]] Collected: 12/12/20 1755    Specimen: Blood Updated: 12/12/20 1900     Extra Tube hold for add-on     Comment: Auto resulted       Lavender Top [[id number]]  Collected: 12/12/20 1755    Specimen: Blood Updated: 12/12/20 1900     Extra Tube hold for add-on     Comment: Auto resulted       Gold Top - SST [[id number]] Collected: 12/12/20 1755    Specimen: Blood Updated: 12/12/20 1900     Extra Tube Hold for add-ons.     Comment: Auto resulted.       Troponin [[id number]]  (Normal) Collected: 12/12/20 1749    Specimen: Blood Updated: 12/12/20 1815     Troponin T <0.010 ng/mL     Narrative:      Troponin T Reference Range:  <= 0.03 ng/mL-   Negative for AMI  >0.03 ng/mL-     Abnormal for myocardial necrosis.  Clinicians would have to utilize clinical acumen, EKG, Troponin and serial changes to determine if it is an Acute Myocardial Infarction or myocardial injury due to an underlying chronic condition.       Results may be falsely decreased if patient taking Biotin.      POC Glucose Once [[id number]]  (Abnormal) Collected: 12/12/20 1624    Specimen: Blood Updated: 12/12/20 1643     Glucose 295 mg/dL      Comment: RN NotifiedOperator: [id number] QUYNH NIEVESFERMeter ID: [id number]             HOSPITAL COURSE:  Active Hospital Problems    Diagnosis POA   • Chest pain [R07.9] Yes   • Coronary artery disease involving native coronary artery of native heart without angina pectoris [I25.10] Yes   • Type 2 diabetes mellitus without complication, with long-term current use of insulin (CMS/[facility]) [E11.9, Z79.4] Not Applicable   • Essential hypertension [I10] Yes       Patient was admitted as a transfer for chest pain. He had two negative enzymes and was pain free this morning. He was COVID-19 negative. Patient is at his baseline this morning. Patient had a LHC in September with no intervention. Last ECHO was at the end of August. Patient at baseline this morning as such is being discharged with close PCP follow up. Will also have cards follow up arranged on discharged.    Results for orders placed during the hospital encounter of 08/29/20   Adult Transthoracic Echo Complete W/ Cont if  "Necessary Per Protocol    Narrative · Normal EF 51-55%.            PHYSICAL EXAM ON DISCHARGE:  /62 (BP Location: Right arm, Patient Position: Lying)   Pulse 65   Temp 97.3 °F (36.3 °C) (Temporal)   Resp 19   Ht 180.3 cm (71\")   Wt (!) 196 kg (432 lb 1.6 oz)   SpO2 98%   BMI 60.27 kg/m²      Physical Exam  Vitals signs and nursing note reviewed.   Constitutional:       Appearance: He is obese.   HENT:      Head: Normocephalic and atraumatic.      Right Ear: External ear normal.      Left Ear: External ear normal.      Nose: Nose normal.      Mouth/Throat:      Mouth: Mucous membranes are moist.   Cardiovascular:      Rate and Rhythm: Normal rate.      Comments: S1+S2  Pulmonary:      Effort: Pulmonary effort is normal.      Breath sounds: Normal breath sounds.   Abdominal:      General: Bowel sounds are normal.      Palpations: Abdomen is soft.   Skin:     General: Skin is warm and dry.   Neurological:      Mental Status: He is alert and oriented to person, place, and time.   Psychiatric:         Mood and Affect: Mood normal.         Behavior: Behavior normal.         CONDITION ON DISCHARGE:   Good    Review of Systems   Constitutional: Positive for activity change and fatigue.   Respiratory: Negative for cough and shortness of breath.    Cardiovascular: Negative for chest pain (Denies any chest pain overnight).   Gastrointestinal: Negative for abdominal pain and nausea.   Musculoskeletal: Positive for arthralgias.   Skin: Negative for color change and rash.   Psychiatric/Behavioral: Negative for agitation and confusion.       DISPOSITION:  Home or Self Care    DISCHARGE MEDICATIONS     Discharge Medications      Continue These Medications      Instructions Start Date   albuterol sulfate  (90 Base) MCG/ACT inhaler  Commonly known as: PROVENTIL HFA;VENTOLIN HFA;PROAIR HFA   2 puffs, Inhalation, Every 4 Hours PRN      albuterol 1.25 MG/3ML nebulizer solution  Commonly known as: ACCUNEB   1.25 mg, " "Nebulization, Every 6 Hours PRN      atorvastatin 80 MG tablet  Commonly known as: LIPITOR   80 mg, Oral, Nightly      clindamycin 300 MG capsule  Commonly known as: CLEOCIN   300 mg, Oral, 3 Times Daily      dilTIAZem  MG 24 hr capsule  Commonly known as: Cardizem CD   120 mg, Oral, Daily      fenofibrate micronized 134 MG capsule  Commonly known as: LOFIBRA   134 mg, Oral, Every Morning Before Breakfast      gabapentin 300 MG capsule  Commonly known as: NEURONTIN   600 mg, Oral, 2 Times Daily - RT      glucose monitor monitoring kit   1 each, Does not apply, As Needed      insulin glargine 100 UNIT/ML injection  Commonly known as: LANTUS   60 Units, Subcutaneous, Nightly      insulin lispro 100 UNIT/ML injection  Commonly known as: HumaLOG   15 Units, Subcutaneous, 3 Times Daily Before Meals      ipratropium 17 MCG/ACT inhaler  Commonly known as: ATROVENT HFA   2 puffs, Inhalation      isosorbide mononitrate 120 MG 24 hr tablet  Commonly known as: IMDUR   120 mg, Oral, Every Morning      lisinopril 40 MG tablet  Commonly known as: PRINIVIL,ZESTRIL   40 mg, Oral, Nightly      methocarbamol 500 MG tablet  Commonly known as: Robaxin   500 mg, Oral, 4 Times Daily      metoprolol succinate XL 50 MG 24 hr tablet  Commonly known as: TOPROL-XL   50 mg, Oral, Daily      Microlet Lancets misc   One touch delica lancets      nitroglycerin 0.4 MG SL tablet  Commonly known as: NITROSTAT   0.4 mg, Sublingual, Every 5 Minutes PRN      pantoprazole 40 MG EC tablet  Commonly known as: PROTONIX   40 mg, Oral, Nightly      pramipexole 1 MG tablet  Commonly known as: MIRAPEX   2 mg, Oral, Nightly      prasugrel 10 MG tablet  Commonly known as: EFFIENT   10 mg, Oral, Daily      ranolazine 1000 MG 12 hr tablet  Commonly known as: RANEXA   1,000 mg, Oral, Every 12 Hours Scheduled      ReliOn Insulin Syringe 31G X 15/64\" 0.5 ML misc  Generic drug: Insulin Syringe-Needle U-100   For use with insulin      rivaroxaban 20 MG " tablet  Commonly known as: XARELTO   20 mg, Oral, Daily With Dinner      traZODone 300 MG tablet  Commonly known as: DESYREL   300 mg, Oral, Every Night at Bedtime             INSTRUCTIONS:  Activity:   Activity Instructions     Activity as Tolerated          Diet:   Diet Instructions     Diet: Regular, Consistent Carbohydrate, Cardiac      Discharge Diet:  Regular  Consistent Carbohydrate  Cardiac             Special instructions: Patient instructed to call MD or return to ED with worsening shortness of breath, chest pain, fever greater than 100.4 degrees F or any other medical concerns..    FOLLOW UP:   Follow-up Information     Mami Perez APRN. Schedule an appointment as soon as possible for a visit in 1 week(s).    Specialties: Family Medicine, Nurse Practitioner  Why: Hospital discharge follow up.  Contact information:  [address] [place] [address] [place] [address] [phone]             Demetri Tejada MD. Schedule an appointment as soon as possible for a visit in 1 month(s).    Specialty: Cardiology  Why: Hospital discharge follow up for chest pain. [facility] in September with no intervention.  Contact information:  [address] [place] [address] [address] [place] [address] [phone]                   PENDING TEST RESULTS AT DISCHARGE      Time: Discharge 30 min          This document has been electronically signed by Yared Mcnulty MD on December 13, 2020 09:03 CST

## 2020-12-14 ENCOUNTER — TRANSITIONAL CARE MANAGEMENT TELEPHONE ENCOUNTER (OUTPATIENT)
Dept: CALL CENTER | Facility: HOSPITAL | Age: 40
End: 2020-12-14

## 2020-12-14 NOTE — OUTREACH NOTE
Call Center TCM Note      Responses   [facility] patient discharged from?  [place]   Does the patient have one of the following disease processes/diagnoses(primary or secondary)?  Other   TCM attempt successful?  No   Unsuccessful attempts  Attempt 2          Claudine Pedraza RN    12/14/2020, 16:35 EST

## 2020-12-14 NOTE — OUTREACH NOTE
Call Center TCM Note      Responses   [facility] patient discharged from?  [place]   Does the patient have one of the following disease processes/diagnoses(primary or secondary)?  Other   TCM attempt successful?  No   Unsuccessful attempts  Attempt 1          Claudine Pedraza RN    12/14/2020, 16:02 EST

## 2020-12-15 ENCOUNTER — TRANSITIONAL CARE MANAGEMENT TELEPHONE ENCOUNTER (OUTPATIENT)
Dept: CALL CENTER | Facility: HOSPITAL | Age: 40
End: 2020-12-15

## 2020-12-15 NOTE — OUTREACH NOTE
Call Center TCM Note      Responses   [facility] patient discharged from?  [place]   Does the patient have one of the following disease processes/diagnoses(primary or secondary)?  Other   TCM attempt successful?  No   Unsuccessful attempts  Attempt 3          Fiona Chris LPN    12/15/2020, 15:57 EST

## 2020-12-19 ENCOUNTER — APPOINTMENT (OUTPATIENT)
Dept: GENERAL RADIOLOGY | Facility: HOSPITAL | Age: 40
End: 2020-12-19

## 2020-12-19 ENCOUNTER — HOSPITAL ENCOUNTER (EMERGENCY)
Facility: HOSPITAL | Age: 40
Discharge: HOME | End: 2020-12-20
Attending: FAMILY MEDICINE | Admitting: FAMILY MEDICINE

## 2020-12-19 DIAGNOSIS — R07.9 CHEST PAIN IN ADULT: Primary | ICD-10-CM

## 2020-12-19 DIAGNOSIS — R73.9 HYPERGLYCEMIA: ICD-10-CM

## 2020-12-19 PROCEDURE — 96374 THER/PROPH/DIAG INJ IV PUSH: CPT

## 2020-12-19 PROCEDURE — 83880 ASSAY OF NATRIURETIC PEPTIDE: CPT | Performed by: FAMILY MEDICINE

## 2020-12-19 PROCEDURE — 85025 COMPLETE CBC W/AUTO DIFF WBC: CPT | Performed by: FAMILY MEDICINE

## 2020-12-19 PROCEDURE — 80053 COMPREHEN METABOLIC PANEL: CPT | Performed by: FAMILY MEDICINE

## 2020-12-19 PROCEDURE — 93005 ELECTROCARDIOGRAM TRACING: CPT | Performed by: FAMILY MEDICINE

## 2020-12-19 PROCEDURE — 99285 EMERGENCY DEPT VISIT HI MDM: CPT

## 2020-12-19 PROCEDURE — 84484 ASSAY OF TROPONIN QUANT: CPT | Performed by: FAMILY MEDICINE

## 2020-12-19 PROCEDURE — 93010 ELECTROCARDIOGRAM REPORT: CPT | Performed by: INTERNAL MEDICINE

## 2020-12-19 PROCEDURE — 93005 ELECTROCARDIOGRAM TRACING: CPT

## 2020-12-19 PROCEDURE — 71045 X-RAY EXAM CHEST 1 VIEW: CPT

## 2020-12-19 RX ORDER — SODIUM CHLORIDE 0.9 % (FLUSH) 0.9 %
10 SYRINGE (ML) INJECTION AS NEEDED
Status: DISCONTINUED | OUTPATIENT
Start: 2020-12-19 | End: 2020-12-20 | Stop reason: HOSPADM

## 2020-12-19 RX ORDER — ASPIRIN 81 MG/1
324 TABLET, CHEWABLE ORAL ONCE
Status: COMPLETED | OUTPATIENT
Start: 2020-12-19 | End: 2020-12-19

## 2020-12-19 RX ADMIN — ASPIRIN 324 MG: 81 TABLET, CHEWABLE ORAL at 23:50

## 2020-12-20 VITALS
SYSTOLIC BLOOD PRESSURE: 184 MMHG | TEMPERATURE: 98 F | WEIGHT: 315 LBS | BODY MASS INDEX: 44.1 KG/M2 | RESPIRATION RATE: 26 BRPM | HEIGHT: 71 IN | DIASTOLIC BLOOD PRESSURE: 74 MMHG | HEART RATE: 94 BPM | OXYGEN SATURATION: 96 %

## 2020-12-20 LAB
ALBUMIN SERPL-MCNC: 3.4 G/DL (ref 3.5–5.2)
ALBUMIN/GLOB SERPL: 0.9 G/DL
ALP SERPL-CCNC: 88 U/L (ref 39–117)
ALT SERPL W P-5'-P-CCNC: 13 U/L (ref 1–41)
ANION GAP SERPL CALCULATED.3IONS-SCNC: 12 MMOL/L (ref 5–15)
AST SERPL-CCNC: 19 U/L (ref 1–40)
BASOPHILS # BLD AUTO: 0.04 10*3/MM3 (ref 0–0.2)
BASOPHILS NFR BLD AUTO: 0.5 % (ref 0–1.5)
BILIRUB SERPL-MCNC: 0.2 MG/DL (ref 0–1.2)
BUN SERPL-MCNC: 13 MG/DL (ref 6–20)
BUN/CREAT SERPL: 15.7 (ref 7–25)
CALCIUM SPEC-SCNC: 9.1 MG/DL (ref 8.6–10.5)
CHLORIDE SERPL-SCNC: 95 MMOL/L (ref 98–107)
CO2 SERPL-SCNC: 23 MMOL/L (ref 22–29)
CREAT SERPL-MCNC: 0.83 MG/DL (ref 0.76–1.27)
DEPRECATED RDW RBC AUTO: 40 FL (ref 37–54)
EOSINOPHIL # BLD AUTO: 0.27 10*3/MM3 (ref 0–0.4)
EOSINOPHIL NFR BLD AUTO: 3.5 % (ref 0.3–6.2)
ERYTHROCYTE [DISTWIDTH] IN BLOOD BY AUTOMATED COUNT: 13.6 % (ref 12.3–15.4)
GFR SERPL CREATININE-BSD FRML MDRD: 102 ML/MIN/1.73
GLOBULIN UR ELPH-MCNC: 4 GM/DL
GLUCOSE BLDC GLUCOMTR-MCNC: 371 MG/DL (ref 70–130)
GLUCOSE SERPL-MCNC: 517 MG/DL (ref 65–99)
HCT VFR BLD AUTO: 41 % (ref 37.5–51)
HGB BLD-MCNC: 14.7 G/DL (ref 13–17.7)
HOLD SPECIMEN: NORMAL
HOLD SPECIMEN: NORMAL
IMM GRANULOCYTES # BLD AUTO: 0.11 10*3/MM3 (ref 0–0.05)
IMM GRANULOCYTES NFR BLD AUTO: 1.4 % (ref 0–0.5)
LYMPHOCYTES # BLD AUTO: 2.17 10*3/MM3 (ref 0.7–3.1)
LYMPHOCYTES NFR BLD AUTO: 28.1 % (ref 19.6–45.3)
MCH RBC QN AUTO: 29.4 PG (ref 26.6–33)
MCHC RBC AUTO-ENTMCNC: 35.9 G/DL (ref 31.5–35.7)
MCV RBC AUTO: 82 FL (ref 79–97)
MONOCYTES # BLD AUTO: 0.64 10*3/MM3 (ref 0.1–0.9)
MONOCYTES NFR BLD AUTO: 8.3 % (ref 5–12)
NEUTROPHILS NFR BLD AUTO: 4.5 10*3/MM3 (ref 1.7–7)
NEUTROPHILS NFR BLD AUTO: 58.2 % (ref 42.7–76)
NRBC BLD AUTO-RTO: 0 /100 WBC (ref 0–0.2)
NT-PROBNP SERPL-MCNC: 16.9 PG/ML (ref 0–450)
PLATELET # BLD AUTO: 211 10*3/MM3 (ref 140–450)
PMV BLD AUTO: 9.3 FL (ref 6–12)
POTASSIUM SERPL-SCNC: 4 MMOL/L (ref 3.5–5.2)
PROT SERPL-MCNC: 7.4 G/DL (ref 6–8.5)
RBC # BLD AUTO: 5 10*6/MM3 (ref 4.14–5.8)
SODIUM SERPL-SCNC: 130 MMOL/L (ref 136–145)
TROPONIN T SERPL-MCNC: <0.01 NG/ML (ref 0–0.03)
TROPONIN T SERPL-MCNC: <0.01 NG/ML (ref 0–0.03)
WBC # BLD AUTO: 7.73 10*3/MM3 (ref 3.4–10.8)
WHOLE BLOOD HOLD SPECIMEN: NORMAL
WHOLE BLOOD HOLD SPECIMEN: NORMAL

## 2020-12-20 PROCEDURE — 25010000002 MORPHINE PER 10 MG: Performed by: FAMILY MEDICINE

## 2020-12-20 PROCEDURE — 93005 ELECTROCARDIOGRAM TRACING: CPT | Performed by: FAMILY MEDICINE

## 2020-12-20 PROCEDURE — 96374 THER/PROPH/DIAG INJ IV PUSH: CPT

## 2020-12-20 PROCEDURE — 63710000001 INSULIN REGULAR HUMAN PER 5 UNITS: Performed by: FAMILY MEDICINE

## 2020-12-20 PROCEDURE — 84484 ASSAY OF TROPONIN QUANT: CPT | Performed by: FAMILY MEDICINE

## 2020-12-20 PROCEDURE — 93010 ELECTROCARDIOGRAM REPORT: CPT | Performed by: INTERNAL MEDICINE

## 2020-12-20 PROCEDURE — 82962 GLUCOSE BLOOD TEST: CPT

## 2020-12-20 RX ORDER — MORPHINE SULFATE 2 MG/ML
2 INJECTION, SOLUTION INTRAMUSCULAR; INTRAVENOUS ONCE
Status: COMPLETED | OUTPATIENT
Start: 2020-12-20 | End: 2020-12-20

## 2020-12-20 RX ADMIN — HUMAN INSULIN 10 UNITS: 100 INJECTION, SOLUTION SUBCUTANEOUS at 01:49

## 2020-12-20 RX ADMIN — MORPHINE SULFATE 2 MG: 2 INJECTION, SOLUTION INTRAMUSCULAR; INTRAVENOUS at 01:49

## 2020-12-20 RX ADMIN — SODIUM CHLORIDE 1000 ML: 9 INJECTION, SOLUTION INTRAVENOUS at 01:49

## 2020-12-20 NOTE — ED PROVIDER NOTES
Subjective     History provided by:  Patient   used: No    Patient is a 40 years old with past medical history artery disease, pulmonary embolism, diabetes, factor II deficiency, hypertension hyperlipidemia morbid obesity who presented here today because of chest pain.  Patient also said that she has some shortness of breath.  Denies any radiation of pain.  Denies any nausea vomiting.  Denies any sweating.    Review of Systems   All other systems reviewed and are negative.      Past Medical History:   Diagnosis Date   • CAD (coronary artery disease)    • Chronic back pain    • Chronic pulmonary embolism (CMS/HCC)    • Coronary artery disease    • Diabetes mellitus (CMS/HCC)    • Factor II deficiency (CMS/HCC)    • Fatty liver    • GERD (gastroesophageal reflux disease)    • Hyperlipidemia    • Hypertension    • Morbid obesity (CMS/HCC)    • RLS (restless legs syndrome)    • Seizures (CMS/HCC)    • Sleep apnea        Allergies   Allergen Reactions   • Glipizide Other (See Comments) and Unknown (See Comments)     Slurred Speech  Slurred Speech  Hallucinations, Slurred Speech   • Reglan [Metoclopramide] Anxiety   • Tramadol Other (See Comments)     seizures   • Risperidone Other (See Comments)     Slurred speech  Can't stand, trouble breathing, slurred speech         Past Surgical History:   Procedure Laterality Date   • CARDIAC CATHETERIZATION N/A 3/15/2017   • CARDIAC CATHETERIZATION N/A 3/15/2017   • CARDIAC CATHETERIZATION N/A 3/1/2018   • CARDIAC CATHETERIZATION N/A 9/2/2020   • CHOLECYSTECTOMY     • CORONARY ANGIOPLASTY WITH STENT PLACEMENT     • [place] RT/LT HEART CATHETERS N/A 3/15/2017   • TRANSESOPHAGEAL ECHOCARDIOGRAM ([place])         Family History   Problem Relation Age of Onset   • Heart disease Mother    • Obesity Mother    • Sleep apnea Father    • Cancer Paternal Grandfather        Social History     Socioeconomic History   • Marital status:      Spouse name: Cori   • Number of  "children: 0   • Years of education: Not on file   • Highest education level: Not on file   Occupational History   • Occupation: Disabled   Tobacco Use   • Smoking status: Former Smoker     Packs/day: 0.25     Years: 0.50     Pack years: 0.12     Types: Cigarettes     Quit date:      Years since quittin.9   • Smokeless tobacco: Current User     Types: Snuff   Substance and Sexual Activity   • Alcohol use: No   • Drug use: No   • Sexual activity: Not Currently         BP (!) 191/86   Pulse 94   Temp 98 °F (36.7 °C)   Resp 26   Ht 180.3 cm (71\")   Wt (!) 196 kg (431 lb)   SpO2 96%   BMI 60.11 kg/m²   Objective   Physical Exam  Vitals signs and nursing note reviewed.   Constitutional:       Appearance: He is well-developed and normal weight.   HENT:      Head: Normocephalic and atraumatic.   Eyes:      Extraocular Movements: Extraocular movements intact.      Pupils: Pupils are equal, round, and reactive to light.   Neck:      Musculoskeletal: Normal range of motion and neck supple.   Cardiovascular:      Rate and Rhythm: Normal rate and regular rhythm.      Heart sounds: Normal heart sounds.   Pulmonary:      Effort: Pulmonary effort is normal.      Breath sounds: Normal breath sounds.   Abdominal:      General: Bowel sounds are normal.      Palpations: Abdomen is soft.   Musculoskeletal: Normal range of motion.   Skin:     General: Skin is warm.      Capillary Refill: Capillary refill takes less than 2 seconds.   Neurological:      General: No focal deficit present.      Mental Status: He is alert and oriented to person, place, and time.   Psychiatric:         Mood and Affect: Mood normal.         Behavior: Behavior normal.         Procedures           ED Course  ED Course as of Dec 20 0238   Sun Dec 20, 2020   0233 Patient has been recently worked up for chest pain.  Angiogram was done sometime in September he has mild occlusion of the stent.  Dr. Grimes advised him to modify his behavior and take " medication.Result was discussed patient.  Told to follow-up with Dr. Grimes in 3 days.  Come back to ER symptoms get worse.    [MO]      ED Course User Index  [MO] Alan Root MD            Labs Reviewed   COMPREHENSIVE METABOLIC PANEL - Abnormal; Notable for the following components:       Result Value    Glucose 517 (*)     Sodium 130 (*)     Chloride 95 (*)     Albumin 3.40 (*)     All other components within normal limits    Narrative:     GFR Normal >60  Chronic Kidney Disease <60  Kidney Failure <15     CBC WITH AUTO DIFFERENTIAL - Abnormal; Notable for the following components:    MCHC 35.9 (*)     Immature Grans % 1.4 (*)     Immature Grans, Absolute 0.11 (*)     All other components within normal limits   TROPONIN (IN-HOUSE) - Normal    Narrative:     Troponin T Reference Range:  <= 0.03 ng/mL-   Negative for AMI  >0.03 ng/mL-     Abnormal for myocardial necrosis.  Clinicians would have to utilize clinical acumen, EKG, Troponin and serial changes to determine if it is an Acute Myocardial Infarction or myocardial injury due to an underlying chronic condition.       Results may be falsely decreased if patient taking Biotin.     TROPONIN (IN-HOUSE) - Normal    Narrative:     Troponin T Reference Range:  <= 0.03 ng/mL-   Negative for AMI  >0.03 ng/mL-     Abnormal for myocardial necrosis.  Clinicians would have to utilize clinical acumen, EKG, Troponin and serial changes to determine if it is an Acute Myocardial Infarction or myocardial injury due to an underlying chronic condition.       Results may be falsely decreased if patient taking Biotin.     BNP (IN-HOUSE) - Normal    Narrative:     Among patients with dyspnea, NT-proBNP is highly sensitive for the detection of acute congestive heart failure. In addition NT-proBNP of <300 pg/ml effectively rules out acute congestive heart failure with 99% negative predictive value.    Results may be falsely decreased if patient taking Biotin.     RAINBOW  DRAW    Narrative:     The following orders were created for panel order [place] Draw.  Procedure                               Abnormality         Status                     ---------                               -----------         ------                     Light Blue Top[[id number]]                                   Final result               Green Top (Gel)[[id number]]                                  Final result               Lavender Top[[id number]]                                     Final result               Gold Top - SST[[id number]]                                   Final result                 Please view results for these tests on the individual orders.   CBC AND DIFFERENTIAL    Narrative:     The following orders were created for panel order CBC & Differential.  Procedure                               Abnormality         Status                     ---------                               -----------         ------                     CBC Auto Differential[[id number]]        Abnormal            Final result                 Please view results for these tests on the individual orders.   LIGHT BLUE TOP   GREEN TOP   LAVENDER TOP   GOLD TOP - SST       XR Chest 1 View   Final Result      Atelectatic change versus infiltrate lower lungs bilaterally.      Electronically signed by:  Giulia Aragon MD  12/20/2020 12:23 AM   [facility] Workstation: [id number]                                        [facility]    Final diagnoses:   Chest pain in adult   Hyperglycemia            Alan Root MD  12/20/20 0237       Alan Root MD  12/20/20 0238

## 2020-12-20 NOTE — ED NOTES
Called lab for update on blood. Lab states having blood but was not scanned in. Labs states they will now run the blood. Will continue to monitor.      Arin Tello RN  12/20/20 0019

## 2020-12-23 LAB
QT INTERVAL: 376 MS
QT INTERVAL: 396 MS
QTC INTERVAL: 464 MS
QTC INTERVAL: 516 MS

## 2021-01-04 ENCOUNTER — HOSPITAL ENCOUNTER (OUTPATIENT)
Facility: HOSPITAL | Age: 41
Setting detail: OBSERVATION
Discharge: HOME | End: 2021-01-06
Attending: STUDENT IN AN ORGANIZED HEALTH CARE EDUCATION/TRAINING PROGRAM | Admitting: HOSPITALIST

## 2021-01-04 ENCOUNTER — APPOINTMENT (OUTPATIENT)
Dept: GENERAL RADIOLOGY | Facility: HOSPITAL | Age: 41
End: 2021-01-04

## 2021-01-04 DIAGNOSIS — R06.02 SOB (SHORTNESS OF BREATH): ICD-10-CM

## 2021-01-04 DIAGNOSIS — R07.9 CHEST PAIN, UNSPECIFIED TYPE: Primary | ICD-10-CM

## 2021-01-04 LAB
ALBUMIN SERPL-MCNC: 3.6 G/DL (ref 3.5–5.2)
ALBUMIN/GLOB SERPL: 1 G/DL
ALP SERPL-CCNC: 88 U/L (ref 39–117)
ALT SERPL W P-5'-P-CCNC: 19 U/L (ref 1–41)
ANION GAP SERPL CALCULATED.3IONS-SCNC: 10 MMOL/L (ref 5–15)
AST SERPL-CCNC: 17 U/L (ref 1–40)
BASOPHILS # BLD AUTO: 0.04 10*3/MM3 (ref 0–0.2)
BASOPHILS NFR BLD AUTO: 0.5 % (ref 0–1.5)
BILIRUB SERPL-MCNC: 0.2 MG/DL (ref 0–1.2)
BUN SERPL-MCNC: 12 MG/DL (ref 6–20)
BUN/CREAT SERPL: 14.6 (ref 7–25)
CALCIUM SPEC-SCNC: 9.1 MG/DL (ref 8.6–10.5)
CHLORIDE SERPL-SCNC: 95 MMOL/L (ref 98–107)
CO2 SERPL-SCNC: 28 MMOL/L (ref 22–29)
CREAT SERPL-MCNC: 0.82 MG/DL (ref 0.76–1.27)
DEPRECATED RDW RBC AUTO: 39.7 FL (ref 37–54)
EOSINOPHIL # BLD AUTO: 0.2 10*3/MM3 (ref 0–0.4)
EOSINOPHIL NFR BLD AUTO: 2.6 % (ref 0.3–6.2)
ERYTHROCYTE [DISTWIDTH] IN BLOOD BY AUTOMATED COUNT: 13.2 % (ref 12.3–15.4)
GFR SERPL CREATININE-BSD FRML MDRD: 103 ML/MIN/1.73
GLOBULIN UR ELPH-MCNC: 3.7 GM/DL
GLUCOSE SERPL-MCNC: 416 MG/DL (ref 65–99)
HCT VFR BLD AUTO: 40.3 % (ref 37.5–51)
HGB BLD-MCNC: 13.6 G/DL (ref 13–17.7)
HOLD SPECIMEN: NORMAL
IMM GRANULOCYTES # BLD AUTO: 0.14 10*3/MM3 (ref 0–0.05)
IMM GRANULOCYTES NFR BLD AUTO: 1.8 % (ref 0–0.5)
INR PPP: 0.84 (ref 0.8–1.2)
LYMPHOCYTES # BLD AUTO: 1.84 10*3/MM3 (ref 0.7–3.1)
LYMPHOCYTES NFR BLD AUTO: 23.6 % (ref 19.6–45.3)
MCH RBC QN AUTO: 27.9 PG (ref 26.6–33)
MCHC RBC AUTO-ENTMCNC: 33.7 G/DL (ref 31.5–35.7)
MCV RBC AUTO: 82.8 FL (ref 79–97)
MONOCYTES # BLD AUTO: 0.63 10*3/MM3 (ref 0.1–0.9)
MONOCYTES NFR BLD AUTO: 8.1 % (ref 5–12)
NEUTROPHILS NFR BLD AUTO: 4.94 10*3/MM3 (ref 1.7–7)
NEUTROPHILS NFR BLD AUTO: 63.4 % (ref 42.7–76)
NRBC BLD AUTO-RTO: 0 /100 WBC (ref 0–0.2)
NT-PROBNP SERPL-MCNC: 75.2 PG/ML (ref 0–450)
PLATELET # BLD AUTO: 194 10*3/MM3 (ref 140–450)
PMV BLD AUTO: 8.8 FL (ref 6–12)
POTASSIUM SERPL-SCNC: 4.2 MMOL/L (ref 3.5–5.2)
PROT SERPL-MCNC: 7.3 G/DL (ref 6–8.5)
PROTHROMBIN TIME: 11.8 SECONDS (ref 11.1–15.3)
RBC # BLD AUTO: 4.87 10*6/MM3 (ref 4.14–5.8)
SODIUM SERPL-SCNC: 133 MMOL/L (ref 136–145)
TROPONIN T SERPL-MCNC: <0.01 NG/ML (ref 0–0.03)
WBC # BLD AUTO: 7.79 10*3/MM3 (ref 3.4–10.8)
WHOLE BLOOD HOLD SPECIMEN: NORMAL
WHOLE BLOOD HOLD SPECIMEN: NORMAL

## 2021-01-04 PROCEDURE — 83880 ASSAY OF NATRIURETIC PEPTIDE: CPT | Performed by: STUDENT IN AN ORGANIZED HEALTH CARE EDUCATION/TRAINING PROGRAM

## 2021-01-04 PROCEDURE — 99285 EMERGENCY DEPT VISIT HI MDM: CPT

## 2021-01-04 PROCEDURE — 85025 COMPLETE CBC W/AUTO DIFF WBC: CPT

## 2021-01-04 PROCEDURE — 93010 ELECTROCARDIOGRAM REPORT: CPT | Performed by: INTERNAL MEDICINE

## 2021-01-04 PROCEDURE — 84484 ASSAY OF TROPONIN QUANT: CPT | Performed by: STUDENT IN AN ORGANIZED HEALTH CARE EDUCATION/TRAINING PROGRAM

## 2021-01-04 PROCEDURE — 93005 ELECTROCARDIOGRAM TRACING: CPT

## 2021-01-04 PROCEDURE — 80053 COMPREHEN METABOLIC PANEL: CPT | Performed by: STUDENT IN AN ORGANIZED HEALTH CARE EDUCATION/TRAINING PROGRAM

## 2021-01-04 PROCEDURE — 85379 FIBRIN DEGRADATION QUANT: CPT | Performed by: STUDENT IN AN ORGANIZED HEALTH CARE EDUCATION/TRAINING PROGRAM

## 2021-01-04 PROCEDURE — 94640 AIRWAY INHALATION TREATMENT: CPT

## 2021-01-04 PROCEDURE — 71045 X-RAY EXAM CHEST 1 VIEW: CPT

## 2021-01-04 PROCEDURE — 85610 PROTHROMBIN TIME: CPT | Performed by: STUDENT IN AN ORGANIZED HEALTH CARE EDUCATION/TRAINING PROGRAM

## 2021-01-04 PROCEDURE — 94762 N-INVAS EAR/PLS OXIMTRY CONT: CPT

## 2021-01-04 PROCEDURE — 93005 ELECTROCARDIOGRAM TRACING: CPT | Performed by: STUDENT IN AN ORGANIZED HEALTH CARE EDUCATION/TRAINING PROGRAM

## 2021-01-04 RX ORDER — ASPIRIN 81 MG/1
324 TABLET, CHEWABLE ORAL ONCE
Status: COMPLETED | OUTPATIENT
Start: 2021-01-04 | End: 2021-01-04

## 2021-01-04 RX ORDER — NITROGLYCERIN 0.4 MG/1
0.4 TABLET SUBLINGUAL
Status: DISCONTINUED | OUTPATIENT
Start: 2021-01-04 | End: 2021-01-06 | Stop reason: HOSPADM

## 2021-01-04 RX ORDER — SODIUM CHLORIDE 0.9 % (FLUSH) 0.9 %
10 SYRINGE (ML) INJECTION AS NEEDED
Status: DISCONTINUED | OUTPATIENT
Start: 2021-01-04 | End: 2021-01-05

## 2021-01-04 RX ORDER — IPRATROPIUM BROMIDE AND ALBUTEROL SULFATE 2.5; .5 MG/3ML; MG/3ML
3 SOLUTION RESPIRATORY (INHALATION)
Status: DISCONTINUED | OUTPATIENT
Start: 2021-01-04 | End: 2021-01-05

## 2021-01-04 RX ADMIN — IPRATROPIUM BROMIDE AND ALBUTEROL SULFATE 3 ML: 2.5; .5 SOLUTION RESPIRATORY (INHALATION) at 23:21

## 2021-01-04 RX ADMIN — NITROGLYCERIN 0.4 MG: 0.4 TABLET, ORALLY DISINTEGRATING SUBLINGUAL at 23:54

## 2021-01-04 RX ADMIN — ASPIRIN 81 MG 324 MG: 81 TABLET ORAL at 23:07

## 2021-01-05 LAB
ALBUMIN SERPL-MCNC: 3.2 G/DL (ref 3.5–5.2)
ALP SERPL-CCNC: 81 U/L (ref 39–117)
ALT SERPL W P-5'-P-CCNC: 49 U/L (ref 1–41)
AST SERPL-CCNC: 127 U/L (ref 1–40)
BILIRUB CONJ SERPL-MCNC: 0.2 MG/DL (ref 0–0.3)
BILIRUB INDIRECT SERPL-MCNC: 0.4 MG/DL
BILIRUB SERPL-MCNC: 0.6 MG/DL (ref 0–1.2)
CREAT SERPL-MCNC: 0.58 MG/DL (ref 0.76–1.27)
D-DIMER, QUANTITATIVE (MAD,POW, STR): 334 NG/ML (FEU) (ref 0–470)
D-DIMER, QUANTITATIVE (MAD,POW, STR): 392 NG/ML (FEU) (ref 0–470)
FERRITIN SERPL-MCNC: 183.5 NG/ML (ref 30–400)
FLUAV RNA RESP QL NAA+PROBE: NOT DETECTED
FLUBV RNA RESP QL NAA+PROBE: NOT DETECTED
GFR SERPL CREATININE-BSD FRML MDRD: >150 ML/MIN/1.73
GLUCOSE BLDC GLUCOMTR-MCNC: 272 MG/DL (ref 70–130)
GLUCOSE BLDC GLUCOMTR-MCNC: 302 MG/DL (ref 70–130)
GLUCOSE BLDC GLUCOMTR-MCNC: 351 MG/DL (ref 70–130)
HOLD SPECIMEN: NORMAL
LDH SERPL-CCNC: 273 U/L (ref 135–225)
PROCALCITONIN SERPL-MCNC: 0.09 NG/ML (ref 0–0.25)
PROT SERPL-MCNC: 6.8 G/DL (ref 6–8.5)
QT INTERVAL: 360 MS
QT INTERVAL: 390 MS
QT INTERVAL: 414 MS
QTC INTERVAL: 447 MS
QTC INTERVAL: 468 MS
QTC INTERVAL: 473 MS
SARS-COV-2 RNA RESP QL NAA+PROBE: DETECTED
TROPONIN T SERPL-MCNC: <0.01 NG/ML (ref 0–0.03)

## 2021-01-05 PROCEDURE — 94799 UNLISTED PULMONARY SVC/PX: CPT

## 2021-01-05 PROCEDURE — 63710000001 INSULIN ASPART PER 5 UNITS: Performed by: HOSPITALIST

## 2021-01-05 PROCEDURE — G0378 HOSPITAL OBSERVATION PER HR: HCPCS

## 2021-01-05 PROCEDURE — 25010000002 MORPHINE PER 10 MG: Performed by: HOSPITALIST

## 2021-01-05 PROCEDURE — 83615 LACTATE (LD) (LDH) ENZYME: CPT | Performed by: HOSPITALIST

## 2021-01-05 PROCEDURE — 82565 ASSAY OF CREATININE: CPT | Performed by: HOSPITALIST

## 2021-01-05 PROCEDURE — 87636 SARSCOV2 & INF A&B AMP PRB: CPT | Performed by: STUDENT IN AN ORGANIZED HEALTH CARE EDUCATION/TRAINING PROGRAM

## 2021-01-05 PROCEDURE — 94660 CPAP INITIATION&MGMT: CPT

## 2021-01-05 PROCEDURE — 82728 ASSAY OF FERRITIN: CPT | Performed by: HOSPITALIST

## 2021-01-05 PROCEDURE — 96376 TX/PRO/DX INJ SAME DRUG ADON: CPT

## 2021-01-05 PROCEDURE — 84145 PROCALCITONIN (PCT): CPT | Performed by: HOSPITALIST

## 2021-01-05 PROCEDURE — 96374 THER/PROPH/DIAG INJ IV PUSH: CPT

## 2021-01-05 PROCEDURE — 25010000002 MORPHINE PER 10 MG: Performed by: INTERNAL MEDICINE

## 2021-01-05 PROCEDURE — 93010 ELECTROCARDIOGRAM REPORT: CPT | Performed by: INTERNAL MEDICINE

## 2021-01-05 PROCEDURE — 63710000001 INSULIN ASPART PER 5 UNITS: Performed by: INTERNAL MEDICINE

## 2021-01-05 PROCEDURE — 93005 ELECTROCARDIOGRAM TRACING: CPT | Performed by: INTERNAL MEDICINE

## 2021-01-05 PROCEDURE — 84484 ASSAY OF TROPONIN QUANT: CPT | Performed by: STUDENT IN AN ORGANIZED HEALTH CARE EDUCATION/TRAINING PROGRAM

## 2021-01-05 PROCEDURE — 96375 TX/PRO/DX INJ NEW DRUG ADDON: CPT

## 2021-01-05 PROCEDURE — 25010000002 DEXAMETHASONE PER 1 MG: Performed by: HOSPITALIST

## 2021-01-05 PROCEDURE — 82962 GLUCOSE BLOOD TEST: CPT

## 2021-01-05 PROCEDURE — 85379 FIBRIN DEGRADATION QUANT: CPT | Performed by: HOSPITALIST

## 2021-01-05 PROCEDURE — 80076 HEPATIC FUNCTION PANEL: CPT | Performed by: HOSPITALIST

## 2021-01-05 RX ORDER — PRASUGREL 10 MG/1
10 TABLET, FILM COATED ORAL DAILY
Status: DISCONTINUED | OUTPATIENT
Start: 2021-01-05 | End: 2021-01-06 | Stop reason: HOSPADM

## 2021-01-05 RX ORDER — DILTIAZEM HYDROCHLORIDE 120 MG/1
120 CAPSULE, COATED, EXTENDED RELEASE ORAL DAILY
Status: DISCONTINUED | OUTPATIENT
Start: 2021-01-05 | End: 2021-01-06 | Stop reason: HOSPADM

## 2021-01-05 RX ORDER — PRAMIPEXOLE DIHYDROCHLORIDE 1 MG/1
2 TABLET ORAL NIGHTLY
Status: DISCONTINUED | OUTPATIENT
Start: 2021-01-06 | End: 2021-01-06 | Stop reason: HOSPADM

## 2021-01-05 RX ORDER — LISINOPRIL 40 MG/1
40 TABLET ORAL NIGHTLY
Status: DISCONTINUED | OUTPATIENT
Start: 2021-01-05 | End: 2021-01-06 | Stop reason: HOSPADM

## 2021-01-05 RX ORDER — DEXTROSE MONOHYDRATE 25 G/50ML
25 INJECTION, SOLUTION INTRAVENOUS
Status: DISCONTINUED | OUTPATIENT
Start: 2021-01-05 | End: 2021-01-06 | Stop reason: HOSPADM

## 2021-01-05 RX ORDER — PANTOPRAZOLE SODIUM 40 MG/1
40 TABLET, DELAYED RELEASE ORAL NIGHTLY
Status: DISCONTINUED | OUTPATIENT
Start: 2021-01-05 | End: 2021-01-06 | Stop reason: HOSPADM

## 2021-01-05 RX ORDER — SODIUM CHLORIDE 0.9 % (FLUSH) 0.9 %
10 SYRINGE (ML) INJECTION EVERY 12 HOURS SCHEDULED
Status: DISCONTINUED | OUTPATIENT
Start: 2021-01-05 | End: 2021-01-06 | Stop reason: HOSPADM

## 2021-01-05 RX ORDER — NICOTINE POLACRILEX 4 MG
15 LOZENGE BUCCAL
Status: DISCONTINUED | OUTPATIENT
Start: 2021-01-05 | End: 2021-01-06 | Stop reason: HOSPADM

## 2021-01-05 RX ORDER — GABAPENTIN 300 MG/1
600 CAPSULE ORAL
Status: DISCONTINUED | OUTPATIENT
Start: 2021-01-05 | End: 2021-01-06 | Stop reason: HOSPADM

## 2021-01-05 RX ORDER — MORPHINE SULFATE 2 MG/ML
1 INJECTION, SOLUTION INTRAMUSCULAR; INTRAVENOUS ONCE
Status: COMPLETED | OUTPATIENT
Start: 2021-01-05 | End: 2021-01-05

## 2021-01-05 RX ORDER — TRAZODONE HYDROCHLORIDE 150 MG/1
300 TABLET ORAL NIGHTLY PRN
Status: DISCONTINUED | OUTPATIENT
Start: 2021-01-06 | End: 2021-01-06 | Stop reason: HOSPADM

## 2021-01-05 RX ORDER — METOPROLOL SUCCINATE 50 MG/1
50 TABLET, EXTENDED RELEASE ORAL DAILY
Status: DISCONTINUED | OUTPATIENT
Start: 2021-01-05 | End: 2021-01-06 | Stop reason: HOSPADM

## 2021-01-05 RX ORDER — NICOTINE POLACRILEX 4 MG
15 LOZENGE BUCCAL
Status: DISCONTINUED | OUTPATIENT
Start: 2021-01-05 | End: 2021-01-05 | Stop reason: SDUPTHER

## 2021-01-05 RX ORDER — ONDANSETRON 4 MG/1
4 TABLET, FILM COATED ORAL EVERY 6 HOURS PRN
Status: DISCONTINUED | OUTPATIENT
Start: 2021-01-05 | End: 2021-01-06 | Stop reason: HOSPADM

## 2021-01-05 RX ORDER — ACETAMINOPHEN 325 MG/1
650 TABLET ORAL EVERY 4 HOURS PRN
Status: DISCONTINUED | OUTPATIENT
Start: 2021-01-05 | End: 2021-01-06 | Stop reason: HOSPADM

## 2021-01-05 RX ORDER — ALBUTEROL SULFATE 2.5 MG/3ML
1.25 SOLUTION RESPIRATORY (INHALATION) EVERY 6 HOURS PRN
Status: DISCONTINUED | OUTPATIENT
Start: 2021-01-05 | End: 2021-01-05

## 2021-01-05 RX ORDER — SODIUM CHLORIDE 0.9 % (FLUSH) 0.9 %
10 SYRINGE (ML) INJECTION AS NEEDED
Status: DISCONTINUED | OUTPATIENT
Start: 2021-01-05 | End: 2021-01-06 | Stop reason: HOSPADM

## 2021-01-05 RX ORDER — ATORVASTATIN CALCIUM 40 MG/1
80 TABLET, FILM COATED ORAL NIGHTLY
Status: DISCONTINUED | OUTPATIENT
Start: 2021-01-06 | End: 2021-01-06 | Stop reason: HOSPADM

## 2021-01-05 RX ORDER — DEXAMETHASONE SODIUM PHOSPHATE 4 MG/ML
6 INJECTION, SOLUTION INTRA-ARTICULAR; INTRALESIONAL; INTRAMUSCULAR; INTRAVENOUS; SOFT TISSUE DAILY
Status: DISCONTINUED | OUTPATIENT
Start: 2021-01-05 | End: 2021-01-06 | Stop reason: HOSPADM

## 2021-01-05 RX ORDER — RANOLAZINE 500 MG/1
1000 TABLET, EXTENDED RELEASE ORAL EVERY 12 HOURS SCHEDULED
Status: DISCONTINUED | OUTPATIENT
Start: 2021-01-05 | End: 2021-01-06 | Stop reason: HOSPADM

## 2021-01-05 RX ORDER — ISOSORBIDE MONONITRATE 60 MG/1
120 TABLET, EXTENDED RELEASE ORAL EVERY MORNING
Status: DISCONTINUED | OUTPATIENT
Start: 2021-01-05 | End: 2021-01-06 | Stop reason: HOSPADM

## 2021-01-05 RX ORDER — METHOCARBAMOL 500 MG/1
500 TABLET, FILM COATED ORAL 4 TIMES DAILY
Status: DISCONTINUED | OUTPATIENT
Start: 2021-01-05 | End: 2021-01-06 | Stop reason: HOSPADM

## 2021-01-05 RX ORDER — MORPHINE SULFATE 2 MG/ML
2 INJECTION, SOLUTION INTRAMUSCULAR; INTRAVENOUS
Status: DISCONTINUED | OUTPATIENT
Start: 2021-01-05 | End: 2021-01-06 | Stop reason: HOSPADM

## 2021-01-05 RX ADMIN — MORPHINE SULFATE 2 MG: 2 INJECTION, SOLUTION INTRAMUSCULAR; INTRAVENOUS at 22:51

## 2021-01-05 RX ADMIN — PANTOPRAZOLE SODIUM 40 MG: 40 TABLET, DELAYED RELEASE ORAL at 21:41

## 2021-01-05 RX ADMIN — RANOLAZINE 1000 MG: 500 TABLET, FILM COATED, EXTENDED RELEASE ORAL at 03:25

## 2021-01-05 RX ADMIN — METHOCARBAMOL 500 MG: 500 TABLET, FILM COATED ORAL at 21:41

## 2021-01-05 RX ADMIN — PANTOPRAZOLE SODIUM 40 MG: 40 TABLET, DELAYED RELEASE ORAL at 03:25

## 2021-01-05 RX ADMIN — ISOSORBIDE MONONITRATE 120 MG: 60 TABLET, EXTENDED RELEASE ORAL at 08:01

## 2021-01-05 RX ADMIN — NITROGLYCERIN 0.4 MG: 0.4 TABLET, ORALLY DISINTEGRATING SUBLINGUAL at 08:06

## 2021-01-05 RX ADMIN — INSULIN ASPART 15 UNITS: 100 INJECTION, SOLUTION INTRAVENOUS; SUBCUTANEOUS at 12:30

## 2021-01-05 RX ADMIN — MORPHINE SULFATE 2 MG: 2 INJECTION, SOLUTION INTRAMUSCULAR; INTRAVENOUS at 15:08

## 2021-01-05 RX ADMIN — IPRATROPIUM BROMIDE 2 PUFF: 17 AEROSOL, METERED RESPIRATORY (INHALATION) at 03:33

## 2021-01-05 RX ADMIN — METHOCARBAMOL 500 MG: 500 TABLET, FILM COATED ORAL at 03:24

## 2021-01-05 RX ADMIN — NITROGLYCERIN 0.4 MG: 0.4 TABLET, ORALLY DISINTEGRATING SUBLINGUAL at 00:00

## 2021-01-05 RX ADMIN — METHOCARBAMOL 500 MG: 500 TABLET, FILM COATED ORAL at 08:06

## 2021-01-05 RX ADMIN — INSULIN ASPART 12 UNITS: 100 INJECTION, SOLUTION INTRAVENOUS; SUBCUTANEOUS at 12:29

## 2021-01-05 RX ADMIN — INSULIN ASPART 10 UNITS: 100 INJECTION, SOLUTION INTRAVENOUS; SUBCUTANEOUS at 08:02

## 2021-01-05 RX ADMIN — RIVAROXABAN 20 MG: 10 TABLET, FILM COATED ORAL at 17:00

## 2021-01-05 RX ADMIN — GABAPENTIN 600 MG: 300 CAPSULE ORAL at 03:24

## 2021-01-05 RX ADMIN — DILTIAZEM HYDROCHLORIDE 120 MG: 120 CAPSULE, COATED, EXTENDED RELEASE ORAL at 09:49

## 2021-01-05 RX ADMIN — SODIUM CHLORIDE, PRESERVATIVE FREE 10 ML: 5 INJECTION INTRAVENOUS at 08:00

## 2021-01-05 RX ADMIN — LISINOPRIL 40 MG: 40 TABLET ORAL at 21:43

## 2021-01-05 RX ADMIN — INSULIN ASPART 16 UNITS: 100 INJECTION, SOLUTION INTRAVENOUS; SUBCUTANEOUS at 17:01

## 2021-01-05 RX ADMIN — MORPHINE SULFATE 1 MG: 2 INJECTION, SOLUTION INTRAMUSCULAR; INTRAVENOUS at 04:52

## 2021-01-05 RX ADMIN — INSULIN ASPART 15 UNITS: 100 INJECTION, SOLUTION INTRAVENOUS; SUBCUTANEOUS at 08:02

## 2021-01-05 RX ADMIN — PRASUGREL 10 MG: 10 TABLET, FILM COATED ORAL at 08:06

## 2021-01-05 RX ADMIN — METOPROLOL SUCCINATE 50 MG: 50 TABLET, EXTENDED RELEASE ORAL at 08:03

## 2021-01-05 RX ADMIN — DEXAMETHASONE SODIUM PHOSPHATE 6 MG: 4 INJECTION, SOLUTION INTRAMUSCULAR; INTRAVENOUS at 09:49

## 2021-01-05 RX ADMIN — REMDESIVIR 200 MG: 100 INJECTION, POWDER, LYOPHILIZED, FOR SOLUTION INTRAVENOUS at 10:08

## 2021-01-05 RX ADMIN — MORPHINE SULFATE 2 MG: 2 INJECTION, SOLUTION INTRAMUSCULAR; INTRAVENOUS at 09:54

## 2021-01-05 RX ADMIN — INSULIN ASPART 15 UNITS: 100 INJECTION, SOLUTION INTRAVENOUS; SUBCUTANEOUS at 17:00

## 2021-01-05 RX ADMIN — IPRATROPIUM BROMIDE 2 PUFF: 17 AEROSOL, METERED RESPIRATORY (INHALATION) at 20:18

## 2021-01-05 RX ADMIN — METHOCARBAMOL 500 MG: 500 TABLET, FILM COATED ORAL at 17:00

## 2021-01-05 RX ADMIN — LISINOPRIL 40 MG: 40 TABLET ORAL at 03:25

## 2021-01-05 RX ADMIN — METHOCARBAMOL 500 MG: 500 TABLET, FILM COATED ORAL at 14:16

## 2021-01-05 RX ADMIN — SODIUM CHLORIDE, PRESERVATIVE FREE 10 ML: 5 INJECTION INTRAVENOUS at 21:42

## 2021-01-05 RX ADMIN — RANOLAZINE 1000 MG: 500 TABLET, FILM COATED, EXTENDED RELEASE ORAL at 21:40

## 2021-01-05 RX ADMIN — SODIUM CHLORIDE, PRESERVATIVE FREE 10 ML: 5 INJECTION INTRAVENOUS at 03:27

## 2021-01-05 RX ADMIN — NITROGLYCERIN 0.4 MG: 0.4 TABLET, ORALLY DISINTEGRATING SUBLINGUAL at 04:01

## 2021-01-05 RX ADMIN — GABAPENTIN 600 MG: 300 CAPSULE ORAL at 21:41

## 2021-01-05 NOTE — PLAN OF CARE
Goal Outcome Evaluation:     Progress: no change  Outcome Summary: pt. c/o chest pain which is reduced to pain goal level with 1mg morphine; O2 used et sats 100% on 2L; EKG showing NSR with HR at 79

## 2021-01-05 NOTE — PLAN OF CARE
Problem: Adult Inpatient Plan of Care  Goal: Plan of Care Review  Outcome: Ongoing, Progressing  Flowsheets (Taken 1/5/2021 1242)  Progress: no change  Plan of Care Reviewed With: patient  Outcome Summary: patient c/p CP and SOB. Remdesvir  started today.  requring 2 l NC. continue to monitor  Goal: Patient-Specific Goal (Individualized)  Outcome: Ongoing, Progressing  Goal: Absence of Hospital-Acquired Illness or Injury  Outcome: Ongoing, Progressing  Intervention: Identify and Manage Fall Risk  Recent Flowsheet Documentation  Taken 1/5/2021 0954 by Yordy Erickson, RN  Safety Promotion/Fall Prevention: safety round/check completed  Taken 1/5/2021 0800 by Yordy Erickson, RN  Safety Promotion/Fall Prevention: safety round/check completed  Goal: Optimal Comfort and Wellbeing  Outcome: Ongoing, Progressing  Intervention: Provide Person-Centered Care  Recent Flowsheet Documentation  Taken 1/5/2021 0723 by Yordy Erickson, RN  Trust Relationship/Rapport: care explained  Goal: Readiness for Transition of Care  Outcome: Ongoing, Progressing     Problem: Chest Pain  Goal: Resolution of Chest Pain Symptoms  Outcome: Ongoing, Progressing  Intervention: Manage Acute Chest Pain  Recent Flowsheet Documentation  Taken 1/5/2021 0800 by Yordy Erickson, RN  Chest Pain Intervention:   see MAR   12-lead ECG obtained     Problem: Diabetes Comorbidity  Goal: Blood Glucose Level Within Desired Range  Outcome: Ongoing, Progressing     Problem: Hypertension Comorbidity  Goal: Blood Pressure in Desired Range  Outcome: Ongoing, Progressing     Problem: Obstructive Sleep Apnea Risk or Actual (Comorbidity Management)  Goal: Unobstructed Breathing During Sleep  Outcome: Ongoing, Progressing   Goal Outcome Evaluation:  Plan of Care Reviewed With: patient  Progress: no change  Outcome Summary: patient c/p CP and SOB. Remdesvir  started today.  requring 2 l NC. continue to monitor

## 2021-01-05 NOTE — PROGRESS NOTES
Discharge Planning Assessment  [facility]     Patient Name: Yordy Hansen  MRN: [id number]  Today's Date: 1/5/2021    Admit Date: 1/4/2021    Discharge Needs Assessment     Row Name 01/05/21 1015       Living Environment    Lives With  alone    Current Living Arrangements  home/apartment/condo Information on face sheet confirmed.    Primary Care Provided by  self    Provides Primary Care For  no one    Family Caregiver if Needed  parent(s)    Quality of Family Relationships  involved    Able to Return to Prior Arrangements  yes       Resource/Environmental Concerns    Resource/Environmental Concerns  none    Transportation Concerns  car, none       Transition Planning    Patient/Family Anticipates Transition to  home    Patient/Family Anticipated Services at Transition  none    Transportation Anticipated  car, drives self Patient voiced that he plans to drive himself home at d/c.       Discharge Needs Assessment    Readmission Within the Last 30 Days  other (see comments) Currently OBV status.    Equipment Currently Used at Home  cane, straight;walker, rolling;cpap;glucometer;other (see comments) Patient received CPAP machine from [facility].    Concerns to be Addressed  denies needs/concerns at this time    Anticipated Changes Related to Illness  none    Equipment Needed After Discharge  other (see comments) If unable to wean, patient may require home oxygen set up. Patient did not voice DME vendor preference at this time.    Current Discharge Risk  chronically ill        Discharge Plan     Row Name 01/05/21 1019       Plan    Plan  home with no services    Plan Comments  D/C planning assessment completed with patient. Patient denies need for home health services. He plans to return to his home pending medical stability. Continue with medical management.....Amber CRUZ    Row Name 01/05/21 0962       Plan    Plan Comments  Chest pain - trops negative X 2.  SOA - Covid +.   MESFIN Chua RN [facility]         Continued Care and Services - Admitted Since 1/4/2021    Coordination has not been started for this encounter.       Expected Discharge Date and Time     Expected Discharge Date Expected Discharge Time    Jan 6, 2021         Demographic Summary     Row Name 01/05/21 1009       General Information    Admission Type  observation    Arrived From  emergency department    Reason for Consult  discharge planning    Preferred Language  English     Used During This Interaction  no       Contact Information    Contact Information Obtained for          Functional Status     Row Name 01/05/21 1010       Functional Status    Usual Activity Tolerance  good    Current Activity Tolerance  moderate    Functional Status Comments  Patient voiced independence with ADL's prior to admission.       Functional Status, IADL    Medications  independent Pharmacy, [facility], and rx coverage confirmed including Xarelto, ranaxa, and effient.    Meal Preparation  independent    Housekeeping  independent    Laundry  independent    Shopping  independent       Mental Status Summary    Recent Changes in Mental Status/Cognitive Functioning  no changes       Employment/    Employment Status  disabled        Psychosocial    No documentation.       Abuse/Neglect    No documentation.       Legal    No documentation.       Substance Abuse    No documentation.       Patient Forms    No documentation.           GINNA Dubois

## 2021-01-05 NOTE — PROGRESS NOTES
[facility]  INPATIENT PROGRESS NOTE    Length of Stay: 0  Date of Admission: 1/4/2021  Primary Care Physician: Mami Perez APRN    Subjective   Chief Complaint: Chest pain  HPI: Patient continues to complain of significant midsternal chest heaviness.  He states nitroglycerin did not help very much, but morphine did help some.    Review of Systems   Constitutional: Positive for activity change and fatigue. Negative for appetite change, chills and fever.   Respiratory: Positive for cough, chest tightness and shortness of breath.    Cardiovascular: Negative for chest pain, palpitations and leg swelling.   Gastrointestinal: Negative for abdominal pain, constipation, diarrhea, nausea and vomiting.   Skin: Negative for wound.   Neurological: Negative for dizziness, weakness, light-headedness, numbness and headaches.        All pertinent negatives and positives are as above. All other systems have been reviewed and are negative unless otherwise stated.     Objective    Temp:  [96.1 °F (35.6 °C)-97.8 °F (36.6 °C)] 96.1 °F (35.6 °C)  Heart Rate:  [] 70  Resp:  [20-26] 24  BP: (111-188)/() 145/65    Physical Exam  Vitals signs reviewed.   Constitutional:       Appearance: He is well-developed. He is morbidly obese.      Comments: BMI 62.01   HENT:      Head: Normocephalic and atraumatic.   Eyes:      Pupils: Pupils are equal, round, and reactive to light.   Neck:      Musculoskeletal: Normal range of motion and neck supple.   Cardiovascular:      Rate and Rhythm: Normal rate and regular rhythm.      Heart sounds: Normal heart sounds. No murmur. No friction rub. No gallop.    Pulmonary:      Effort: Pulmonary effort is normal. No respiratory distress.      Breath sounds: Normal breath sounds. No wheezing or rales.   Chest:      Chest wall: No tenderness.   Abdominal:      General: Bowel sounds are normal. There is no distension.      Palpations:  Abdomen is soft.      Tenderness: There is no abdominal tenderness.   Psychiatric:         Behavior: Behavior normal.       Results Review:  I have reviewed the labs, radiology results, and diagnostic studies.    Laboratory Data:   Results from last 7 days   Lab Units 01/05/21  1012 01/04/21  2204   SODIUM mmol/L  --  133*   POTASSIUM mmol/L  --  4.2   CHLORIDE mmol/L  --  95*   CO2 mmol/L  --  28.0   BUN mg/dL  --  12   CREATININE mg/dL 0.58* 0.82   GLUCOSE mg/dL  --  416*   CALCIUM mg/dL  --  9.1   BILIRUBIN mg/dL 0.6 0.2   ALK PHOS U/L 81 88   ALT (SGPT) U/L 49* 19   AST (SGOT) U/L 127* 17   ANION GAP mmol/L  --  10.0     Estimated Creatinine Clearance: 295.7 mL/min (A) (by C-G formula based on SCr of 0.58 mg/dL (L)).          Results from last 7 days   Lab Units 01/04/21  2204   WBC 10*3/mm3 7.79   HEMOGLOBIN g/dL 13.6   HEMATOCRIT % 40.3   PLATELETS 10*3/mm3 194     Results from last 7 days   Lab Units 01/04/21  2204   INR  0.84       Culture Data:   No results found for: BLOODCX  No results found for: URINECX  No results found for: RESPCX  No results found for: WOUNDCX  No results found for: STOOLCX  No components found for: BODYFLD    Radiology Data:   Imaging Results (Last 24 Hours)     Procedure Component Value Units Date/Time    XR Chest 1 View [[id number]] Collected: 01/04/21 2212     Updated: 01/04/21 2254    Narrative:      EXAM DESCRIPTION:     XR CHEST 1 VW    CLINICAL HISTORY:     Chest Pain triage protocol  Chest Pain Triage Protocol     COMPARISON:     12/19/2020    TECHNIQUE:    Single AP view of the chest    FINDINGS:     Cardiac silhouette is within normal limits. There is no focal  parenchymal or pleural disease. There is no acute osseous process  visualized.      Impression:        No evidence of acute cardiopulmonary disease.      Electronically signed by:  Kenneth Brush MD  1/4/2021 10:53 PM CST  Workstation: [id number]          I have reviewed the patient's current medications.      Assessment/Plan     Active Hospital Problems    Diagnosis   • **Chest pain   • Paroxysmal atrial fibrillation (CMS/HCC)   • Morbid obesity (CMS/HCC)   • Coronary artery disease   • Essential hypertension       Plan:    1.  Chest pain: Cardiac enzymes negative x2, EKG without acute findings.  Potentially due to COVID-19.  2.  Paroxysmal atrial fibrillation: Continue current treatment.  3.  Morbid obesity: BMI 62.01.  Discussed weight loss strategies with patient.  5.  Coronary artery disease: Continue home medications.  6.  Hypertension felt continue home medication.  7.  DVT prophylaxis: Continue Xarelto.          The patient was evaluated during the global COVID-19 pandemic, and the diagnosis was suspected/considered upon their initial presentation.  Evaluation, treatment, and testing were consistent with current guidelines for patients who present with complaints or symptoms that may be related to COVID-19.    Discharge Planning: I expect patient to be discharged to home in 2-3 days.        This document has been electronically signed by Yordy Goncalves MD on January 5, 2021 12:23 CST

## 2021-01-05 NOTE — H&P
[facility] Medicine Admission      Date of Admission: 1/4/2021, 00:18 CST      Primary Care Physician: Mami Perez APRN      Chief Complaint: Chest pain    HPI: 40-year-old male with past medical history significant for insulin-dependent type 2 diabetes mellitus, coronary artery disease status post stenting, chronic chest pain, morbid obesity, hypertension, obstructive sleep apnea, asthma and history of pulmonary embolism who presents to emergency department with chest pain.  The patient endorses the onset of sharp pain and pressure on the evening of 1/4/2021.  It is exacerbated with activity and relieved with rest and nitroglycerin.  He associates this with shortness of breath and diaphoresis.  He states that this is very similar to chest pain that he has had in the past.    Concurrent Medical History:  has a past medical history of CAD (coronary artery disease), Chronic back pain, Chronic pulmonary embolism (CMS/HCC), Coronary artery disease, Diabetes mellitus (CMS/HCC), Factor II deficiency (CMS/HCC), Fatty liver, GERD (gastroesophageal reflux disease), Hyperlipidemia, Hypertension, Morbid obesity (CMS/HCC), RLS (restless legs syndrome), Seizures (CMS/HCC), and Sleep apnea.    Past Surgical History:  has a past surgical history that includes transesophageal echocardiogram (travis); pr rt/lt heart catheters (N/A, 3/15/2017); Cardiac catheterization (N/A, 3/15/2017); Cardiac catheterization (N/A, 3/15/2017); Coronary angioplasty with stent; Cardiac catheterization (N/A, 3/1/2018); Cholecystectomy; and Cardiac catheterization (N/A, 9/2/2020).    Family History: family history includes Cancer in his paternal grandfather; Heart disease in his mother; Obesity in his mother; Sleep apnea in his father.     Social History:  reports that he quit smoking about 24 years ago. His smoking use included cigarettes. He has a 0.13 pack-year smoking history. His smokeless tobacco  Assessment/Plan


Status:  stable


Assessment/Plan:


1. Urinary retention.


2. BPH.


3. Neurogenic bladder.


4. Incontinence.


5. Pyuria/UTI/colonized.


6. Hematuria.


7. Proteinuria.


8. Renal insufficiency, acute possibly on chronic.


9. Renal cyst.


10. Nephrolithiasis.


11. Renal fullness.


12. Bladder calcifications.


13. POD # 12, cysto/optic urethrotomy.





monitor clinically


mayorga placed 11/27


hand irrigated and do PRN


abx as ordered, pr ID


consider antifungals, per ID


monitor WBC


f/u on last blood cx


renal fxn stable





Subjective


Allergies:  


Coded Allergies:  


     AZTREONAM (Verified  Allergy, Unknown, 7/23/18)


Subjective


all noted, mayorga draining, non-verbal





Objective





Last 24 Hour Vital Signs








  Date Time  Temp Pulse Resp B/P (MAP) Pulse Ox O2 Delivery O2 Flow Rate FiO2


 


12/9/19 12:45  96 14     30


 


12/9/19 11:02  97 15     30


 


12/9/19 08:55  98 14     30


 


12/9/19 08:00 97.2 97 16 134/73 (93) 100   


 


12/9/19 08:00        30


 


12/9/19 08:00      Mechanical Ventilator  


 


12/9/19 08:00  97      


 


12/9/19 07:01  96 14     30


 


12/9/19 04:49  97 15     30


 


12/9/19 04:00  89      


 


12/9/19 04:00        30


 


12/9/19 04:00 96.7 88 15 136/82 (100) 100   


 


12/9/19 04:00      Mechanical Ventilator  


 


12/9/19 02:32  98 14     30


 


12/9/19 00:44  96 14     30


 


12/9/19 00:00        30


 


12/9/19 00:00      Mechanical Ventilator  


 


12/9/19 00:00 97.5 94 15 129/76 (93) 100   


 


12/9/19 00:00  91      


 


12/8/19 22:42  94 14     30


 


12/8/19 21:14  99 16     30


 


12/8/19 21:06 97.8       


 


12/8/19 20:00  95      


 


12/8/19 20:00      Mechanical Ventilator  


 


12/8/19 20:00        30


 


12/8/19 20:00 97.8 98 16 130/79 (96) 100   


 


12/8/19 19:03  95 15     30


 


12/8/19 17:10  101 14     30


 


12/8/19 16:00 97.7 101 16 139/81 (100) 100   


 


12/8/19 16:00        30


 


12/8/19 16:00      Mechanical Ventilator  


 


12/8/19 16:00  99      


 


12/8/19 15:08  105 14     30

















Intake and Output  


 


 12/8/19 12/9/19





 19:00 07:00


 


Intake Total 1813.582 ml 2432.174 ml


 


Output Total  1300 ml


 


Balance 1813.582 ml 1132.174 ml


 


  


 


Free Water  200 ml


 


IV Total 1373.582 ml 1582.174 ml


 


Tube Feeding 440 ml 650 ml


 


Output Urine Total  700 ml


 


Stool Total  600 ml











Microbiology








 Date/Time


Source Procedure


Growth Status


 


 


 12/7/19 17:25


Blood Blood Culture - Preliminary Resulted





 11/25/19 11:00


Nasal Nares - Final Complete


 


 11/25/19 11:00


Nasal Nares - Final Complete


 


 12/5/19 19:00


Stool Clostridium difficile Toxin Assay - Final Complete


 


 12/5/19 19:00


Urine,Clean Catch Urine Culture - Final


Candida Parapsilosis Complete


 


 11/24/19 09:10


Rectum VRE Culture - Final


Enterococcus Faecium - Vre Complete








Current Medications








 Medications


  (Trade)  Dose


 Ordered  Sig/Jan


 Route


 PRN Reason  Start Time


 Stop Time Status Last Admin


Dose Admin


 


 Acetaminophen


  (Tylenol)  650 mg  Q4H  PRN


 GT


 Mild Pain/Temp > 100.6  11/26/19 18:42


 12/26/19 18:41  12/8/19 20:36


 


 


 Amikacin Protocol


  (Amikacin


 pharmacy to dose)  1 ea  DAILY  PRN


 MISC


 Per rx protocol  12/7/19 11:30


 1/6/20 11:29   


 


 


 Amikacin Sulfate


 1000 mg/Sodium


 Chloride  114 ml @ 


 114 mls/hr  Q36H


 IV


   12/8/19 20:00


 12/15/19 19:59  12/8/19 20:23


 


 


 Artificial Tears


  (Akwa-Tears)  2 drop  Q12HR


 BOTH EYES


   11/26/19 21:00


 12/24/19 20:59  12/9/19 09:29


 


 


 Baclofen


  (Lioresal)  10 mg  THREE TIMES A  DAY


 GT


   11/27/19 09:00


 12/24/19 17:59  12/9/19 08:49


 


 


 Calcitonin Grindstone


  (Miacalcin)  1 sprays  DAILY


 NASAL


   11/28/19 12:00


 12/28/19 11:59  12/9/19 09:30


 


 


 Chlorhexidine


 Gluconate


  (Elaine-Hex 2%)  1 applic  DAILY@2000


 TOPIC


   12/6/19 20:00


 1/5/20 19:59  12/8/19 20:22


 


 


 Clobetasol


 Propionate


  (Temovate)  1 applic  EVERY 12  HOURS


 TOPIC


   11/26/19 21:00


 12/24/19 20:59  12/9/19 09:30


 


 


 Dextrose


  (Dextrose 50%)  25 ml  Q30M  PRN


 IV


 Hypoglycemia  11/26/19 18:45


 12/24/19 14:44   


 


 


 Dextrose


  (Dextrose 50%)  50 ml  Q30M  PRN


 IV


 Hypoglycemia  11/26/19 18:45


 12/24/19 14:44   


 


 


 Famotidine


  (Pepcid)  20 mg  BID


 GT


   11/28/19 18:00


 12/28/19 17:59  12/9/19 08:49


 


 


 Fenofibrate


  (Tricor)  145 mg  DAILY


 ORAL


   11/27/19 09:00


 12/25/19 08:59  12/9/19 08:49


 


 


 Heparin Sodium/


 Dextrose  500 ml @ 


 38.102 mls/


 hr  ADJUST PER  PROTOCOL


 IV


   12/8/19 23:45


 1/7/20 23:44  12/9/19 07:47


 


 


 Insulin Aspart


  (NovoLOG)    EVERY 6  HOURS


 SUBQ


   11/30/19 12:00


 12/24/19 16:29  12/9/19 12:31


 


 


 Iohexol


  (OMNIPAQUE-300


 100ml)  100 ml  ONCE  PRN


 INJ


 Radiology Procedure  12/7/19 11:15


 12/9/19 23:59   


 


 


 Levetiracetam


  (Keppra)  1,000 mg  Q8H


 GT


   11/29/19 02:00


 12/29/19 01:59  12/9/19 09:29


 


 


 Meropenem 1 gm/


 Sodium Chloride  55 ml @ 


 110 mls/hr  Q8HR


 IVPB


   12/5/19 14:00


 12/10/19 23:59  12/9/19 05:30


 


 


 Ondansetron HCl


  (Zofran)  4 mg  Q6H  PRN


 IVP


 Nausea & Vomiting  11/26/19 18:44


 12/26/19 18:43   


 


 


 Polyethylene


 Glycol


  (Miralax)  17 gm  DAILYPRN  PRN


 GT


 Constipation  11/26/19 18:44


 12/26/19 18:43   


 


 


 Sitagliptin


 Phosphate


  (Januvia)  50 mg  ACBREAKFAST


 GT


   11/27/19 06:30


 12/25/19 06:29  12/9/19 05:30


 


 


 Sodium Chloride  1,000 ml @ 


 125 mls/hr  Q8H


 IV


   12/5/19 01:45


 1/4/20 01:44  12/9/19 07:48


 


 


 Tamsulosin HCl


  (Flomax)  0.4 mg  DAILY


 ORAL


   11/27/19 09:00


 12/27/19 08:59  12/9/19 08:50


 





Laboratory Tests


12/8/19 16:30: Activated Partial Thromboplast Time 32


12/8/19 23:05: Activated Partial Thromboplast Time 55H


12/9/19 06:40: 


Activated Partial Thromboplast Time 66H, White Blood Count 10.3, Red Blood 

Count 2.67L, Hemoglobin 7.6L, Hematocrit 23.8L, Mean Corpuscular Volume 89, 

Mean Corpuscular Hemoglobin 28.5, Mean Corpuscular Hemoglobin Concent 31.9L, 

Red Cell Distribution Width 15.3H, Platelet Count 265, Mean Platelet Volume 7.6

, Neutrophils (%) (Auto) , Lymphocytes (%) (Auto) , Monocytes (%) (Auto) , 

Eosinophils (%) (Auto) , Basophils (%) (Auto) , Differential Total Cells 

Counted 100, Neutrophils % (Manual) 69, Lymphocytes % (Manual) 13L, Monocytes % 

(Manual) 8, Eosinophils % (Manual) 8H, Basophils % (Manual) 0, Myelocytes % 2H, 

Band Neutrophils 0, Platelet Estimate Adequate, Platelet Morphology Normal, 

Hypochromasia 1+, Anisocytosis 1+, Sodium Level 137, Potassium Level 3.4L, 

Chloride Level 103, Carbon Dioxide Level 26, Anion Gap 8, Blood Urea Nitrogen 9

, Creatinine 1.1, Estimat Glomerular Filtration Rate > 60, Glucose Level 164H, 

Calcium Level 9.5, Phosphorus Level 3.1, Magnesium Level 1.7L, Total Bilirubin 

0.3, Aspartate Amino Transf (AST/SGOT) 20, Alanine Aminotransferase (ALT/SGPT) 

22, Alkaline Phosphatase 100, Total Protein 6.4, Albumin 1.8L, Globulin 4.6, 

Albumin/Globulin Ratio 0.4L


Height (Feet):  5


Height (Inches):  7.00


Weight (Pounds):  209


Objective


 exam stable


mayorga indwelling, yellow/christine urine, some debris





CT C/A/P (12/8) noted











Vinnie Pollard MD Dec 9, 2019 13:17 use includes snuff. He reports that he does not drink alcohol or use drugs.    Allergies:   Allergies   Allergen Reactions   • Glipizide Other (See Comments) and Unknown (See Comments)     Slurred Speech  Slurred Speech  Hallucinations, Slurred Speech   • Reglan [Metoclopramide] Anxiety   • Tramadol Other (See Comments)     seizures   • Risperidone Other (See Comments)     Slurred speech  Can't stand, trouble breathing, slurred speech         Medications:   Prior to Admission medications    Medication Sig Start Date End Date Taking? Authorizing Provider   albuterol (ACCUNEB) 1.25 MG/3ML nebulizer solution Take 3 mL by nebulization Every 6 (Six) Hours As Needed for Wheezing. 11/15/17   Tahmina Reid APRN   albuterol (PROVENTIL HFA;VENTOLIN HFA) 108 (90 BASE) MCG/ACT inhaler Inhale 2 puffs Every 4 (Four) Hours As Needed for Wheezing.    Provider, MD Latrice   atorvastatin (LIPITOR) 80 MG tablet Take 1 tablet by mouth Every Night. 9/29/20   Mami Perez APRN   clindamycin (CLEOCIN) 300 MG capsule Take 1 capsule by mouth 3 (Three) Times a Day. 10/14/20   Ifeoma Goel PA-C   dilTIAZem CD (Cardizem CD) 120 MG 24 hr capsule Take 1 capsule by mouth Daily. 10/8/20   Judy Crews MD   fenofibrate micronized (LOFIBRA) 134 MG capsule Take 1 capsule by mouth Every Morning Before Breakfast. 9/29/20   Mami Perez APRN   gabapentin (NEURONTIN) 300 MG capsule Take 2 capsules by mouth 2 (Two) Times a Day. 12/1/20   Mami Perez APRN   glucose monitor monitoring kit 1 each As Needed (check blood glucose 3x daily). 9/29/20   Mami Perez APRN   insulin glargine (LANTUS) 100 UNIT/ML injection Inject 60 Units under the skin into the appropriate area as directed Every Night. 9/29/20   Mami Perez APRN   insulin lispro (HumaLOG) 100 UNIT/ML injection Inject 15 Units under the skin into the appropriate area as directed 3 (Three) Times a Day Before Meals. 9/29/20   Chris  "LALA Rosenbaum   ipratropium (ATROVENT HFA) 17 MCG/ACT inhaler Inhale 2 puffs. 4/8/20 4/9/21  Provider, MD Latrice   isosorbide mononitrate (IMDUR) 120 MG 24 hr tablet Take 1 tablet by mouth Every Morning for 30 days. 10/10/17 9/29/21  Minesh Gregg MD   lisinopril (PRINIVIL,ZESTRIL) 40 MG tablet Take 1 tablet by mouth Every Night. 9/29/20   Mami Perez APRN   methocarbamol (Robaxin) 500 MG tablet Take 1 tablet by mouth 4 (Four) Times a Day. 9/29/20   Mami Perez APRN   metoprolol succinate XL (TOPROL-XL) 50 MG 24 hr tablet Take 1 tablet by mouth Daily. 9/29/20   Mami Perez APRN   Microlet Lancets misc One touch delica lancets 9/29/20   Mami Perez APRN   nitroglycerin (NITROSTAT) 0.4 MG SL tablet Place 1 tablet under the tongue Every 5 (Five) Minutes As Needed for Chest Pain for up to 15 days. 10/10/17 9/29/21  Minesh Gregg MD   pantoprazole (PROTONIX) 40 MG EC tablet Take 1 tablet by mouth Every Night. 9/29/20   Mami Perez APRN   pramipexole (MIRAPEX) 1 MG tablet Take 2 tablets by mouth Every Night. 9/29/20   Mami Perez APRN   prasugrel (EFFIENT) 10 MG tablet Take 1 tablet by mouth Daily. 9/29/20   Mami Perez APRN   ranolazine (RANEXA) 1000 MG 12 hr tablet Take 1 tablet by mouth Every 12 (Twelve) Hours. 11/10/17   Earnest Perez MD   ReliOn Insulin Syringe 31G X 15/64\" 0.5 ML misc For use with insulin 9/29/20   Mami Perez APRN   rivaroxaban (XARELTO) 20 MG tablet Take 1 tablet by mouth Daily With Dinner. 9/29/20   Mami Perez APRN   traZODone (DESYREL) 300 MG tablet Take 1 tablet by mouth every night at bedtime. 9/29/20   Mami Perez APRN       Review of Systems:  A complete 10 point review of systems was obtained and was negative unless noted in the HPI.    Physical Exam:   Temp:  [97.8 °F (36.6 °C)] 97.8 °F (36.6 °C)  Heart Rate:  [] 105  Resp:  [20-26] 26  BP: " (111-188)/(77-94) 156/85  Physical Exam  Constitutional:       General: He is not in acute distress.     Appearance: He is obese. He is diaphoretic.   HENT:      Head: Normocephalic and atraumatic.      Mouth/Throat:      Mouth: Mucous membranes are moist.      Pharynx: Oropharynx is clear.   Eyes:      Extraocular Movements: Extraocular movements intact.      Pupils: Pupils are equal, round, and reactive to light.   Neck:      Musculoskeletal: Normal range of motion and neck supple.   Cardiovascular:      Rate and Rhythm: Normal rate and regular rhythm.      Heart sounds: Normal heart sounds.   Pulmonary:      Effort: Pulmonary effort is normal. No respiratory distress.   Abdominal:      General: Bowel sounds are normal.      Palpations: Abdomen is soft.      Tenderness: There is no abdominal tenderness.   Musculoskeletal: Normal range of motion.   Skin:     General: Skin is warm.      Capillary Refill: Capillary refill takes less than 2 seconds.   Neurological:      General: No focal deficit present.      Mental Status: He is alert and oriented to person, place, and time. Mental status is at baseline.         Results Reviewed:  I have personally reviewed current lab, radiology, and data and agree with results.  Lab Results (last 24 hours)     Procedure Component Value Units Date/Time    D-dimer, Quantitative [[id number]]  (Normal) Collected: 01/04/21 2204    Specimen: Blood Updated: 01/05/21 0010     D-Dimer, Quantitative 392 ng/mL (FEU)     Narrative:      Dimer values <500 ng/ml FEU are FDA approved as aid in diagnosis of deep venous thrombosis and pulmonary embolism.  This test should not be used in an exclusion strategy with pretest probability alone.    A recent guideline regarding diagnosis for pulmonary thromboembolism recommends an adjusted exclusion criterion of age x 10 ng/ml FEU for patients >50 years of age ([employer]ori Intern Med 2015; 163: 701-711).      [place] Draw [[id number]] Collected: 01/04/21 2204     Specimen: Blood Updated: 01/04/21 2316    Narrative:      The following orders were created for panel order [place] Draw.  Procedure                               Abnormality         Status                     ---------                               -----------         ------                     Light Blue Top[[id number]]                                   Final result               Green Top (Gel)[[id number]]                                  Final result               Lavender Top[[id number]]                                     Final result               Gold Top - SST[[id number]]                                                                Please view results for these tests on the individual orders.    Lavender Top [[id number]] Collected: 01/04/21 2204    Specimen: Blood Updated: 01/04/21 2316     Extra Tube hold for add-on     Comment: Auto resulted       Light Blue Top [[id number]] Collected: 01/04/21 2204    Specimen: Blood Updated: 01/04/21 2315     Extra Tube hold for add-on     Comment: Auto resulted       Green Top (Gel) [[id number]] Collected: 01/04/21 2204    Specimen: Blood Updated: 01/04/21 2315     Extra Tube Hold for add-ons.     Comment: Auto resulted.       Comprehensive Metabolic Panel [[id number]]  (Abnormal) Collected: 01/04/21 2204    Specimen: Blood Updated: 01/04/21 2300     Glucose 416 mg/dL      BUN 12 mg/dL      Creatinine 0.82 mg/dL      Sodium 133 mmol/L      Potassium 4.2 mmol/L      Comment: Slight hemolysis detected by analyzer. Results may be affected.        Chloride 95 mmol/L      CO2 28.0 mmol/L      Calcium 9.1 mg/dL      Total Protein 7.3 g/dL      Albumin 3.60 g/dL      ALT (SGPT) 19 U/L      AST (SGOT) 17 U/L      Comment: Slight hemolysis detected by analyzer. Results may be affected.        Alkaline Phosphatase 88 U/L      Total Bilirubin 0.2 mg/dL      eGFR Non African Amer 103 mL/min/1.73      Globulin 3.7 gm/dL      A/G Ratio 1.0 g/dL      BUN/Creatinine Ratio 14.6     Anion Gap  10.0 mmol/L     Narrative:      GFR Normal >60  Chronic Kidney Disease <60  Kidney Failure <15      Troponin [[id number]]  (Normal) Collected: 01/04/21 2204    Specimen: Blood Updated: 01/04/21 2258     Troponin T <0.010 ng/mL     Narrative:      Troponin T Reference Range:  <= 0.03 ng/mL-   Negative for AMI  >0.03 ng/mL-     Abnormal for myocardial necrosis.  Clinicians would have to utilize clinical acumen, EKG, Troponin and serial changes to determine if it is an Acute Myocardial Infarction or myocardial injury due to an underlying chronic condition.       Results may be falsely decreased if patient taking Biotin.      BNP [[id number]]  (Normal) Collected: 01/04/21 2204    Specimen: Blood Updated: 01/04/21 2255     proBNP 75.2 pg/mL     Narrative:      Among patients with dyspnea, NT-proBNP is highly sensitive for the detection of acute congestive heart failure. In addition NT-proBNP of <300 pg/ml effectively rules out acute congestive heart failure with 99% negative predictive value.    Results may be falsely decreased if patient taking Biotin.      Protime-INR [[id number]]  (Normal) Collected: 01/04/21 2204    Specimen: Blood Updated: 01/04/21 2248     Protime 11.8 Seconds      INR 0.84    Narrative:      Therapeutic range for most indications is 2.0-3.0 INR,  or 2.5-3.5 for mechanical heart valves.    CBC & Differential [[id number]]  (Abnormal) Collected: 01/04/21 2204    Specimen: Blood Updated: 01/04/21 2218    Narrative:      The following orders were created for panel order CBC & Differential.  Procedure                               Abnormality         Status                     ---------                               -----------         ------                     CBC Auto Differential[[id number]]        Abnormal            Final result                 Please view results for these tests on the individual orders.    CBC Auto Differential [[id number]]  (Abnormal) Collected: 01/04/21 2204    Specimen: Blood  Updated: 01/04/21 2218     WBC 7.79 10*3/mm3      RBC 4.87 10*6/mm3      Hemoglobin 13.6 g/dL      Hematocrit 40.3 %      MCV 82.8 fL      MCH 27.9 pg      MCHC 33.7 g/dL      RDW 13.2 %      RDW-SD 39.7 fl      MPV 8.8 fL      Platelets 194 10*3/mm3      Neutrophil % 63.4 %      Lymphocyte % 23.6 %      Monocyte % 8.1 %      Eosinophil % 2.6 %      Basophil % 0.5 %      Immature Grans % 1.8 %      Neutrophils, Absolute 4.94 10*3/mm3      Lymphocytes, Absolute 1.84 10*3/mm3      Monocytes, Absolute 0.63 10*3/mm3      Eosinophils, Absolute 0.20 10*3/mm3      Basophils, Absolute 0.04 10*3/mm3      Immature Grans, Absolute 0.14 10*3/mm3      nRBC 0.0 /100 WBC         Imaging Results (Last 24 Hours)     Procedure Component Value Units Date/Time    XR Chest 1 View [[id number]] Collected: 01/04/21 2212     Updated: 01/04/21 2254    Narrative:      EXAM DESCRIPTION:     XR CHEST 1 VW    CLINICAL HISTORY:     Chest Pain triage protocol  Chest Pain Triage Protocol     COMPARISON:     12/19/2020    TECHNIQUE:    Single AP view of the chest    FINDINGS:     Cardiac silhouette is within normal limits. There is no focal  parenchymal or pleural disease. There is no acute osseous process  visualized.      Impression:        No evidence of acute cardiopulmonary disease.      Electronically signed by:  Kenneth Brush MD  1/4/2021 10:53 PM CST  Workstation: [id number]            Assessment:    Active Hospital Problems    Diagnosis   • **Chest pain   • Paroxysmal atrial fibrillation (CMS/HCC)   • Morbid obesity (CMS/HCC)   • Coronary artery disease   • Essential hypertension           Plan:    #1.  Acute on chronic chest pain with history of coronary artery disease status post stenting.  Troponin enzyme and proBNP within normal limits.  EKG without acute changes.  Monitor on telemetry and trend troponin enzymes overnight, repeat EKG in the morning.  Patient will need to follow-up with cardiology following discharge.    #2.   Insulin-dependent type 2 diabetes mellitus with hyperglycemia.  Continue with patient's home insulin regimen.  Serum glucose checks 4 times daily with meals.      CODE STATUS: Full code  DVT prophylaxis: Rivaroxaban  Diet: Diabetic cardiac  Disposition: Observation admission, anticipate discharge tomorrow morning.      Jose F Cook MD  Hospitalist Service

## 2021-01-05 NOTE — ED PROVIDER NOTES
Subjective   40-year-old male PMH CAD, stent placement, diabetes, hyperlipidemia, morbid obesity, hypertension comes to the ER with substernal chest pressure pain since 7 PM.  Patient has had this in the past.  It is worse with ambulation, but better with rest.  He is not try taking anything for his symptoms.  He also endorses shortness of breath, but does not think he has been wheezing.  He denies COPD, but states he has a history of asthma.  He has not been needing to use his inhaler.      History provided by:  Patient   used: No        Review of Systems   Constitutional: Positive for activity change and diaphoresis. Negative for appetite change, chills, fatigue and fever.   HENT: Negative for congestion and rhinorrhea.    Respiratory: Positive for shortness of breath. Negative for cough and wheezing.    Cardiovascular: Positive for chest pain. Negative for palpitations and leg swelling.   Gastrointestinal: Negative for abdominal pain, diarrhea and nausea.   Genitourinary: Negative for dysuria and flank pain.   Skin: Negative for color change and rash.   Neurological: Negative for dizziness and headaches.   Psychiatric/Behavioral: Negative for agitation. The patient is not nervous/anxious.        Past Medical History:   Diagnosis Date   • CAD (coronary artery disease)    • Chronic back pain    • Chronic pulmonary embolism (CMS/HCC)    • Coronary artery disease    • Diabetes mellitus (CMS/HCC)    • Factor II deficiency (CMS/HCC)    • Fatty liver    • GERD (gastroesophageal reflux disease)    • Hyperlipidemia    • Hypertension    • Morbid obesity (CMS/HCC)    • RLS (restless legs syndrome)    • Seizures (CMS/HCC)    • Sleep apnea        Allergies   Allergen Reactions   • Glipizide Other (See Comments) and Unknown (See Comments)     Slurred Speech  Slurred Speech  Hallucinations, Slurred Speech   • Reglan [Metoclopramide] Anxiety   • Tramadol Other (See Comments)     seizures   • Risperidone Other  (See Comments)     Slurred speech  Can't stand, trouble breathing, slurred speech         Past Surgical History:   Procedure Laterality Date   • CARDIAC CATHETERIZATION N/A 3/15/2017   • CARDIAC CATHETERIZATION N/A 3/15/2017   • CARDIAC CATHETERIZATION N/A 3/1/2018   • CARDIAC CATHETERIZATION N/A 2020   • CHOLECYSTECTOMY     • CORONARY ANGIOPLASTY WITH STENT PLACEMENT     • NC RT/LT HEART CATHETERS N/A 3/15/2017   • TRANSESOPHAGEAL ECHOCARDIOGRAM ([facility])         Family History   Problem Relation Age of Onset   • Heart disease Mother    • Obesity Mother    • Sleep apnea Father    • Cancer Paternal Grandfather        Social History     Socioeconomic History   • Marital status:      Spouse name: Cori   • Number of children: 0   • Years of education: Not on file   • Highest education level: Not on file   Occupational History   • Occupation: Disabled   Tobacco Use   • Smoking status: Former Smoker     Packs/day: 0.25     Years: 0.50     Pack years: 0.12     Types: Cigarettes     Quit date:      Years since quittin.0   • Smokeless tobacco: Current User     Types: Snuff   Substance and Sexual Activity   • Alcohol use: No   • Drug use: No   • Sexual activity: Not Currently           Objective    Vitals:    21 0112 21 0115 21 0131 21 0146   BP:  (!) 134/108 154/82 152/82   BP Location:       Patient Position:       Pulse: 88  92 88   Resp:       Temp:       SpO2: 97%  99% 99%   Weight:       Height:           Physical Exam  Vitals signs and nursing note reviewed.   Constitutional:       General: He is not in acute distress.     Appearance: He is well-developed. He is obese. He is ill-appearing. He is not toxic-appearing or diaphoretic.   HENT:      Head: Normocephalic.      Right Ear: External ear normal.      Left Ear: External ear normal.   Pulmonary:      Effort: Pulmonary effort is normal. No accessory muscle usage or respiratory distress.      Breath sounds: Wheezing present.  No decreased breath sounds.   Chest:      Chest wall: No tenderness.   Abdominal:      General: Bowel sounds are normal.      Palpations: Abdomen is soft. Abdomen is not rigid.      Tenderness: There is no abdominal tenderness (deep palpation).   Musculoskeletal: Normal range of motion.         General: No swelling.   Skin:     General: Skin is warm and moist.      Capillary Refill: Capillary refill takes less than 2 seconds.   Neurological:      Mental Status: He is alert and oriented to person, place, and time. Mental status is at baseline. He is not disoriented.   Psychiatric:         Behavior: Behavior normal.         ECG 12 Lead      Date/Time: 1/5/2021 12:08 AM  Performed by: Bob Ruiz MD  Authorized by: Bob Ruiz MD   Interpreted by physician  Rhythm: sinus tachycardia  Rate: tachycardic  QRS axis: normal  ST Segments: ST segments normal  T Waves: T waves normal                   ED Course      Results for orders placed or performed during the hospital encounter of 01/04/21   COVID-19 and FLU A/B PCR - Swab, Nasopharynx    Specimen: Nasopharynx; Swab   Result Value Ref Range    COVID19 Detected (C) Not Detected - Ref. Range    Influenza A PCR Not Detected Not Detected    Influenza B PCR Not Detected Not Detected   Troponin    Specimen: Blood   Result Value Ref Range    Troponin T <0.010 0.000 - 0.030 ng/mL   Troponin    Specimen: Blood   Result Value Ref Range    Troponin T <0.010 0.000 - 0.030 ng/mL   Comprehensive Metabolic Panel    Specimen: Blood   Result Value Ref Range    Glucose 416 (C) 65 - 99 mg/dL    BUN 12 6 - 20 mg/dL    Creatinine 0.82 0.76 - 1.27 mg/dL    Sodium 133 (L) 136 - 145 mmol/L    Potassium 4.2 3.5 - 5.2 mmol/L    Chloride 95 (L) 98 - 107 mmol/L    CO2 28.0 22.0 - 29.0 mmol/L    Calcium 9.1 8.6 - 10.5 mg/dL    Total Protein 7.3 6.0 - 8.5 g/dL    Albumin 3.60 3.50 - 5.20 g/dL    ALT (SGPT) 19 1 - 41 U/L    AST (SGOT) 17 1 - 40 U/L    Alkaline Phosphatase 88 39 - 117 U/L     Total Bilirubin 0.2 0.0 - 1.2 mg/dL    eGFR Non African Amer 103 >60 mL/min/1.73    Globulin 3.7 gm/dL    A/G Ratio 1.0 g/dL    BUN/Creatinine Ratio 14.6 7.0 - 25.0    Anion Gap 10.0 5.0 - 15.0 mmol/L   BNP    Specimen: Blood   Result Value Ref Range    proBNP 75.2 0.0 - 450.0 pg/mL   Protime-INR    Specimen: Blood   Result Value Ref Range    Protime 11.8 11.1 - 15.3 Seconds    INR 0.84 0.80 - 1.20   CBC Auto Differential    Specimen: Blood   Result Value Ref Range    WBC 7.79 3.40 - 10.80 10*3/mm3    RBC 4.87 4.14 - 5.80 10*6/mm3    Hemoglobin 13.6 13.0 - 17.7 g/dL    Hematocrit 40.3 37.5 - 51.0 %    MCV 82.8 79.0 - 97.0 fL    MCH 27.9 26.6 - 33.0 pg    MCHC 33.7 31.5 - 35.7 g/dL    RDW 13.2 12.3 - 15.4 %    RDW-SD 39.7 37.0 - 54.0 fl    MPV 8.8 6.0 - 12.0 fL    Platelets 194 140 - 450 10*3/mm3    Neutrophil % 63.4 42.7 - 76.0 %    Lymphocyte % 23.6 19.6 - 45.3 %    Monocyte % 8.1 5.0 - 12.0 %    Eosinophil % 2.6 0.3 - 6.2 %    Basophil % 0.5 0.0 - 1.5 %    Immature Grans % 1.8 (H) 0.0 - 0.5 %    Neutrophils, Absolute 4.94 1.70 - 7.00 10*3/mm3    Lymphocytes, Absolute 1.84 0.70 - 3.10 10*3/mm3    Monocytes, Absolute 0.63 0.10 - 0.90 10*3/mm3    Eosinophils, Absolute 0.20 0.00 - 0.40 10*3/mm3    Basophils, Absolute 0.04 0.00 - 0.20 10*3/mm3    Immature Grans, Absolute 0.14 (H) 0.00 - 0.05 10*3/mm3    nRBC 0.0 0.0 - 0.2 /100 WBC   D-dimer, Quantitative    Specimen: Blood   Result Value Ref Range    D-Dimer, Quantitative 392 0 - 470 ng/mL (FEU)   Light Blue Top   Result Value Ref Range    Extra Tube hold for add-on    Green Top (Gel)   Result Value Ref Range    Extra Tube Hold for add-ons.    Lavender Top   Result Value Ref Range    Extra Tube hold for add-on      XR Chest 1 View   Final Result      No evidence of acute cardiopulmonary disease.         Electronically signed by:  Kenneth Brush MD  1/4/2021 10:53 PM [facility]   Workstation: [id number]              MDM  Number of Diagnoses or Management Options  Chest pain,  unspecified type: new and requires workup  SOB (shortness of breath): new and requires workup  Diagnosis management comments: Vital signs are stable, afebrile.  Labs are unremarkable.  Troponin negative.  D-dimer is normal.  Chest x-ray shows no acute cardiopulmonary processes.  EKG is tachycardic, but no acute findings.  Patient received DuoNeb treatment with improvement in his wheezing.  Aspirin and nitro administered with improvement in his chest discomfort.  Spoke with the on-call hospitalist who agrees to admit for further evaluation and treatment.       Amount and/or Complexity of Data Reviewed  Clinical lab tests: ordered and reviewed  Tests in the radiology section of CPT®: ordered and reviewed  Tests in the medicine section of CPT®: ordered and reviewed  Decide to obtain previous medical records or to obtain history from someone other than the patient: yes  Review and summarize past medical records: yes  Discuss the patient with other providers: yes    Patient Progress  Patient progress: stable      Final diagnoses:   Chest pain, unspecified type   SOB (shortness of breath)            Bob Ruiz MD  01/05/21 [id number]

## 2021-01-06 ENCOUNTER — READMISSION MANAGEMENT (OUTPATIENT)
Dept: CALL CENTER | Facility: HOSPITAL | Age: 41
End: 2021-01-06

## 2021-01-06 VITALS
BODY MASS INDEX: 44.1 KG/M2 | OXYGEN SATURATION: 97 % | HEART RATE: 79 BPM | RESPIRATION RATE: 18 BRPM | TEMPERATURE: 96.8 F | SYSTOLIC BLOOD PRESSURE: 160 MMHG | HEIGHT: 71 IN | DIASTOLIC BLOOD PRESSURE: 78 MMHG | WEIGHT: 315 LBS

## 2021-01-06 PROBLEM — U07.1 COVID-19 VIRUS INFECTION: Status: ACTIVE | Noted: 2021-01-06

## 2021-01-06 LAB
ALBUMIN SERPL-MCNC: 3.7 G/DL (ref 3.5–5.2)
ALBUMIN/GLOB SERPL: 0.9 G/DL
ALP SERPL-CCNC: 87 U/L (ref 39–117)
ALT SERPL W P-5'-P-CCNC: 44 U/L (ref 1–41)
ANION GAP SERPL CALCULATED.3IONS-SCNC: 8 MMOL/L (ref 5–15)
AST SERPL-CCNC: 30 U/L (ref 1–40)
BASOPHILS # BLD AUTO: 0.05 10*3/MM3 (ref 0–0.2)
BASOPHILS NFR BLD AUTO: 0.5 % (ref 0–1.5)
BILIRUB CONJ SERPL-MCNC: <0.2 MG/DL (ref 0–0.3)
BILIRUB SERPL-MCNC: 0.4 MG/DL (ref 0–1.2)
BUN SERPL-MCNC: 12 MG/DL (ref 6–20)
BUN/CREAT SERPL: 16.7 (ref 7–25)
CALCIUM SPEC-SCNC: 9.9 MG/DL (ref 8.6–10.5)
CHLORIDE SERPL-SCNC: 92 MMOL/L (ref 98–107)
CK SERPL-CCNC: 242 U/L (ref 20–200)
CO2 SERPL-SCNC: 30 MMOL/L (ref 22–29)
CREAT SERPL-MCNC: 0.72 MG/DL (ref 0.76–1.27)
CRP SERPL-MCNC: 1.35 MG/DL (ref 0–0.5)
D-DIMER, QUANTITATIVE (MAD,POW, STR): <270 NG/ML (FEU) (ref 0–470)
DEPRECATED RDW RBC AUTO: 40.3 FL (ref 37–54)
EOSINOPHIL # BLD AUTO: 0.18 10*3/MM3 (ref 0–0.4)
EOSINOPHIL NFR BLD AUTO: 1.7 % (ref 0.3–6.2)
ERYTHROCYTE [DISTWIDTH] IN BLOOD BY AUTOMATED COUNT: 13.8 % (ref 12.3–15.4)
FERRITIN SERPL-MCNC: 171.9 NG/ML (ref 30–400)
FIBRINOGEN PPP-MCNC: 332 MG/DL (ref 228–514)
GFR SERPL CREATININE-BSD FRML MDRD: 120 ML/MIN/1.73
GLOBULIN UR ELPH-MCNC: 4 GM/DL
GLUCOSE BLDC GLUCOMTR-MCNC: 251 MG/DL (ref 70–130)
GLUCOSE BLDC GLUCOMTR-MCNC: 324 MG/DL (ref 70–130)
GLUCOSE SERPL-MCNC: 303 MG/DL (ref 65–99)
HCT VFR BLD AUTO: 40.6 % (ref 37.5–51)
HGB BLD-MCNC: 13.9 G/DL (ref 13–17.7)
IMM GRANULOCYTES # BLD AUTO: 0.24 10*3/MM3 (ref 0–0.05)
IMM GRANULOCYTES NFR BLD AUTO: 2.3 % (ref 0–0.5)
LDH SERPL-CCNC: 189 U/L (ref 135–225)
LYMPHOCYTES # BLD AUTO: 1.36 10*3/MM3 (ref 0.7–3.1)
LYMPHOCYTES NFR BLD AUTO: 13.1 % (ref 19.6–45.3)
MCH RBC QN AUTO: 28 PG (ref 26.6–33)
MCHC RBC AUTO-ENTMCNC: 34.2 G/DL (ref 31.5–35.7)
MCV RBC AUTO: 81.7 FL (ref 79–97)
MONOCYTES # BLD AUTO: 0.58 10*3/MM3 (ref 0.1–0.9)
MONOCYTES NFR BLD AUTO: 5.6 % (ref 5–12)
NEUTROPHILS NFR BLD AUTO: 7.94 10*3/MM3 (ref 1.7–7)
NEUTROPHILS NFR BLD AUTO: 76.8 % (ref 42.7–76)
NRBC BLD AUTO-RTO: 0 /100 WBC (ref 0–0.2)
PLATELET # BLD AUTO: 215 10*3/MM3 (ref 140–450)
PMV BLD AUTO: 9 FL (ref 6–12)
POTASSIUM SERPL-SCNC: 4.1 MMOL/L (ref 3.5–5.2)
PROT SERPL-MCNC: 7.7 G/DL (ref 6–8.5)
QT INTERVAL: 426 MS
QTC INTERVAL: 432 MS
RBC # BLD AUTO: 4.97 10*6/MM3 (ref 4.14–5.8)
SODIUM SERPL-SCNC: 130 MMOL/L (ref 136–145)
WBC # BLD AUTO: 10.35 10*3/MM3 (ref 3.4–10.8)

## 2021-01-06 PROCEDURE — 96376 TX/PRO/DX INJ SAME DRUG ADON: CPT

## 2021-01-06 PROCEDURE — 83615 LACTATE (LD) (LDH) ENZYME: CPT | Performed by: HOSPITALIST

## 2021-01-06 PROCEDURE — 93005 ELECTROCARDIOGRAM TRACING: CPT | Performed by: INTERNAL MEDICINE

## 2021-01-06 PROCEDURE — 82962 GLUCOSE BLOOD TEST: CPT

## 2021-01-06 PROCEDURE — 85379 FIBRIN DEGRADATION QUANT: CPT | Performed by: HOSPITALIST

## 2021-01-06 PROCEDURE — 85025 COMPLETE CBC W/AUTO DIFF WBC: CPT | Performed by: HOSPITALIST

## 2021-01-06 PROCEDURE — 25010000002 DEXAMETHASONE PER 1 MG: Performed by: HOSPITALIST

## 2021-01-06 PROCEDURE — 94799 UNLISTED PULMONARY SVC/PX: CPT

## 2021-01-06 PROCEDURE — 85384 FIBRINOGEN ACTIVITY: CPT | Performed by: HOSPITALIST

## 2021-01-06 PROCEDURE — 63710000001 INSULIN ASPART PER 5 UNITS: Performed by: HOSPITALIST

## 2021-01-06 PROCEDURE — 93010 ELECTROCARDIOGRAM REPORT: CPT | Performed by: INTERNAL MEDICINE

## 2021-01-06 PROCEDURE — 25010000002 MORPHINE PER 10 MG: Performed by: HOSPITALIST

## 2021-01-06 PROCEDURE — 82550 ASSAY OF CK (CPK): CPT | Performed by: HOSPITALIST

## 2021-01-06 PROCEDURE — 80053 COMPREHEN METABOLIC PANEL: CPT | Performed by: HOSPITALIST

## 2021-01-06 PROCEDURE — 82728 ASSAY OF FERRITIN: CPT | Performed by: HOSPITALIST

## 2021-01-06 PROCEDURE — 86140 C-REACTIVE PROTEIN: CPT | Performed by: HOSPITALIST

## 2021-01-06 PROCEDURE — G0378 HOSPITAL OBSERVATION PER HR: HCPCS

## 2021-01-06 PROCEDURE — 63710000001 INSULIN ASPART PER 5 UNITS: Performed by: INTERNAL MEDICINE

## 2021-01-06 PROCEDURE — 82248 BILIRUBIN DIRECT: CPT | Performed by: HOSPITALIST

## 2021-01-06 RX ORDER — DEXAMETHASONE 6 MG/1
6 TABLET ORAL
Qty: 3 TABLET | Refills: 0 | Status: SHIPPED | OUTPATIENT
Start: 2021-01-06 | End: 2021-01-13 | Stop reason: HOSPADM

## 2021-01-06 RX ADMIN — GABAPENTIN 600 MG: 300 CAPSULE ORAL at 08:16

## 2021-01-06 RX ADMIN — SODIUM CHLORIDE, PRESERVATIVE FREE 10 ML: 5 INJECTION INTRAVENOUS at 08:15

## 2021-01-06 RX ADMIN — DILTIAZEM HYDROCHLORIDE 120 MG: 120 CAPSULE, COATED, EXTENDED RELEASE ORAL at 08:14

## 2021-01-06 RX ADMIN — INSULIN ASPART 15 UNITS: 100 INJECTION, SOLUTION INTRAVENOUS; SUBCUTANEOUS at 08:20

## 2021-01-06 RX ADMIN — RANOLAZINE 1000 MG: 500 TABLET, FILM COATED, EXTENDED RELEASE ORAL at 08:15

## 2021-01-06 RX ADMIN — METHOCARBAMOL 500 MG: 500 TABLET, FILM COATED ORAL at 08:14

## 2021-01-06 RX ADMIN — ISOSORBIDE MONONITRATE 120 MG: 60 TABLET, EXTENDED RELEASE ORAL at 08:14

## 2021-01-06 RX ADMIN — PRASUGREL 10 MG: 10 TABLET, FILM COATED ORAL at 08:14

## 2021-01-06 RX ADMIN — REMDESIVIR 100 MG: 100 INJECTION, POWDER, LYOPHILIZED, FOR SOLUTION INTRAVENOUS at 10:10

## 2021-01-06 RX ADMIN — MORPHINE SULFATE 2 MG: 2 INJECTION, SOLUTION INTRAMUSCULAR; INTRAVENOUS at 01:30

## 2021-01-06 RX ADMIN — METOPROLOL SUCCINATE 50 MG: 50 TABLET, EXTENDED RELEASE ORAL at 08:14

## 2021-01-06 RX ADMIN — DEXAMETHASONE SODIUM PHOSPHATE 6 MG: 4 INJECTION, SOLUTION INTRAMUSCULAR; INTRAVENOUS at 08:15

## 2021-01-06 RX ADMIN — INSULIN ASPART 12 UNITS: 100 INJECTION, SOLUTION INTRAVENOUS; SUBCUTANEOUS at 08:20

## 2021-01-06 NOTE — OUTREACH NOTE
Prep Survey      Responses   [facility] patient discharged from?  [place]   Is LACE score < 7 ?  Yes   Emergency Room discharge w/ pulse ox?  No   Eligibility  [facility]   Date of Admission  01/04/21   Date of Discharge  01/06/21   Discharge Disposition  Home or Self Care   Discharge diagnosis  Chest pain,    COVID-19 virus infection   Does the patient have one of the following disease processes/diagnoses(primary or secondary)?  COVID-19   Does the patient have Home health ordered?  No   Is there a DME ordered?  No   Prep survey completed?  Yes          Bety Akhtar RN

## 2021-01-06 NOTE — DISCHARGE SUMMARY
[facility] Medicine Services  DISCHARGE SUMMARY       Date of Admission: 1/4/2021  Date of Discharge:  1/6/2021  Primary Care Physician: Mami Perez APRN    Presenting Problem/History of Present Illness:  SOB (shortness of breath) [R06.02]  Chest pain, unspecified type [R07.9]       Final Discharge Diagnoses:  Active Hospital Problems    Diagnosis   • **Chest pain   • COVID-19 virus infection   • Paroxysmal atrial fibrillation (CMS/HCC)   • Morbid obesity (CMS/HCC)   • Coronary artery disease   • Essential hypertension       Consults:   Consults     No orders found from 12/6/2020 to 1/5/2021.                 Pertinent Test Results:   Lab Results (most recent)     Procedure Component Value Units Date/Time    D-dimer, Quantitative [[id number]]  (Normal) Collected: 01/06/21 0733    Specimen: Blood Updated: 01/06/21 0818     D-Dimer, Quantitative <270 ng/mL (FEU)     Narrative:      Dimer values <500 ng/ml FEU are FDA approved as aid in diagnosis of deep venous thrombosis and pulmonary embolism.  This test should not be used in an exclusion strategy with pretest probability alone.    A recent guideline regarding diagnosis for pulmonary thromboembolism recommends an adjusted exclusion criterion of age x 10 ng/ml FEU for patients >50 years of age ([employer]ori Intern Med 2015; 163: 701-711).      Fibrinogen [[id number]]  (Normal) Collected: 01/06/21 0733    Specimen: Blood Updated: 01/06/21 0814     Fibrinogen 332 mg/dL     CK [[id number]] Updated: 01/06/21 0806    Specimen: Blood     Ferritin [[id number]] Updated: 01/06/21 0806    Specimen: Blood     Lactate Dehydrogenase [[id number]] Updated: 01/06/21 0806    Specimen: Blood     Comprehensive Metabolic Panel [[id number]] Updated: 01/06/21 0806    Specimen: Blood     C-reactive Protein [[id number]] Updated: 01/06/21 0806    Specimen: Blood     Bilirubin, Direct [[id number]] Updated: 01/06/21 0806    Specimen: Blood     CBC &  Differential [[id number]] Collected: 01/06/21 0733    Specimen: Blood Updated: 01/06/21 0803    Narrative:      The following orders were created for panel order CBC & Differential.  Procedure                               Abnormality         Status                     ---------                               -----------         ------                     Scan Slide[[id number]]                                                                  CBC Auto Differential[[id number]]                                                         Please view results for these tests on the individual orders.    CBC Auto Differential [[id number]] Updated: 01/06/21 0803    Specimen: Blood     POC Glucose Once [[id number]]  (Abnormal) Collected: 01/06/21 0718    Specimen: Blood Updated: 01/06/21 0743     Glucose 251 mg/dL      Comment: RN NotifiedOperator: [id number] NEEMANASEEM ANGELITAMeter ID: [id number]       POC Glucose Once [[id number]]  (Abnormal) Collected: 01/05/21 1634    Specimen: Blood Updated: 01/05/21 1754     Glucose 351 mg/dL      Comment: RN NotifiedOperator: [id number] [facility] DAIAMeter ID: [id number]       D-dimer, Quantitative [[id number]]  (Normal) Collected: 01/05/21 1012    Specimen: Blood Updated: 01/05/21 1058     D-Dimer, Quantitative 334 ng/mL (FEU)     Narrative:      Dimer values <500 ng/ml FEU are FDA approved as aid in diagnosis of deep venous thrombosis and pulmonary embolism.  This test should not be used in an exclusion strategy with pretest probability alone.    A recent guideline regarding diagnosis for pulmonary thromboembolism recommends an adjusted exclusion criterion of age x 10 ng/ml FEU for patients >50 years of age (Lori Intern Med 2015; 163: 701-711).      Ferritin [[id number]]  (Normal) Collected: 01/05/21 1012    Specimen: Blood Updated: 01/05/21 1052     Ferritin 183.50 ng/mL     Narrative:      Results may be falsely decreased if patient taking Biotin.      Procalcitonin [[id number]]  (Normal)  "Collected: 01/05/21 1012    Specimen: Blood Updated: 01/05/21 1051     Procalcitonin 0.09 ng/mL     Narrative:      As a Marker for Sepsis (Non-Neonates):   1. <0.5 ng/mL represents a low risk of severe sepsis and/or septic shock.  1. >2 ng/mL represents a high risk of severe sepsis and/or septic shock.    As a Marker for Lower Respiratory Tract Infections that require antibiotic therapy:  PCT on Admission     Antibiotic Therapy             6-12 Hrs later  > 0.5                Strongly Recommended            >0.25 - <0.5         Recommended  0.1 - 0.25           Discouraged                   Remeasure/reassess PCT  <0.1                 Strongly Discouraged          Remeasure/reassess PCT      As 28 day mortality risk marker: \"Change in Procalcitonin Result\" (> 80 % or <=80 %) if Day 0 (or Day 1) and Day 4 values are available. Refer to http://www.Aventeonpct-calculator.com/   Change in PCT <=80 %   A decrease of PCT levels below or equal to 80 % defines a positive change in PCT test result representing a higher risk for 28-day all-cause mortality of patients diagnosed with severe sepsis or septic shock.  Change in PCT > 80 %   A decrease of PCT levels of more than 80 % defines a negative change in PCT result representing a lower risk for 28-day all-cause mortality of patients diagnosed with severe sepsis or septic shock.                Results may be falsely decreased if patient taking Biotin.     Lactate Dehydrogenase [[id number]]  (Abnormal) Collected: 01/05/21 1012    Specimen: Blood Updated: 01/05/21 1045      U/L      Comment: Specimen hemolyzed.  Results may be affected.       Hepatic Function Panel [[id number]]  (Abnormal) Collected: 01/05/21 1012    Specimen: Blood Updated: 01/05/21 1045     Total Protein 6.8 g/dL      Albumin 3.20 g/dL      ALT (SGPT) 49 U/L      AST (SGOT) 127 U/L      Alkaline Phosphatase 81 U/L      Total Bilirubin 0.6 mg/dL      Bilirubin, Direct 0.2 mg/dL      Bilirubin, Indirect " 0.4 mg/dL     Creatinine, Serum [[id number]]  (Abnormal) Collected: 01/05/21 1012    Specimen: Blood Updated: 01/05/21 1045     Creatinine 0.58 mg/dL      eGFR Non African Amer >150 mL/min/1.73     Narrative:      GFR Normal >60  Chronic Kidney Disease <60  Kidney Failure <15      Roxana Draw [[id number]] Collected: 01/04/21 2204    Specimen: Blood Updated: 01/05/21 0500    Narrative:      The following orders were created for panel order Roxana Draw.  Procedure                               Abnormality         Status                     ---------                               -----------         ------                     Light Blue Top[[id number]]                                   Final result               Green Top (Gel)[[id number]]                                  Final result               Lavender Top[[id number]]                                     Final result               Gold Top - SST[[id number]]                                   Final result                 Please view results for these tests on the individual orders.    Gold Top - SST [[id number]] Collected: 01/04/21 2204    Specimen: Blood Updated: 01/05/21 0500     Extra Tube Hold for add-ons.     Comment: Auto resulted.       COVID-19 and FLU A/B PCR - Swab, Nasopharynx [[id number]]  (Abnormal) Collected: 01/05/21 0019    Specimen: Swab from Nasopharynx Updated: 01/05/21 0155     COVID19 Detected     Influenza A PCR Not Detected     Influenza B PCR Not Detected    Narrative:      Fact sheet for providers: https://www.fda.gov/media/232227/download    Fact sheet for patients: https://www.fda.gov/media/695939/download    Test performed by PCR.  Influenza A and Influenza B negative results should be considered presumptive in samples that have a positive SARS-CoV-2 result.    Competitive inhibition studies showed that SARS-CoV-2 virus, when present at concentrations above 3.6E+04 copies/mL, can inhibit the detection and amplification of influenza A and  influenza B virus RNA if present at or below 1.8E+02 copies/mL or 4.9E+02 copies/mL, respectively, and may lead to false negative influenza virus results. If co-infection with influenza A or influenza B virus is suspected in samples with a positive SARS-CoV-2 result, the sample should be re-tested with another FDA cleared, approved, or authorized influenza test, if influenza virus detection would change clinical management.    Troponin [[id number]]  (Normal) Collected: 01/05/21 0047    Specimen: Blood Updated: 01/05/21 0115     Troponin T <0.010 ng/mL     Narrative:      Troponin T Reference Range:  <= 0.03 ng/mL-   Negative for AMI  >0.03 ng/mL-     Abnormal for myocardial necrosis.  Clinicians would have to utilize clinical acumen, EKG, Troponin and serial changes to determine if it is an Acute Myocardial Infarction or myocardial injury due to an underlying chronic condition.       Results may be falsely decreased if patient taking Biotin.      Lavender Top [[id number]] Collected: 01/04/21 2204    Specimen: Blood Updated: 01/04/21 2316     Extra Tube hold for add-on     Comment: Auto resulted       Light Blue Top [[id number]] Collected: 01/04/21 2204    Specimen: Blood Updated: 01/04/21 2315     Extra Tube hold for add-on     Comment: Auto resulted       Green Top (Gel) [[id number]] Collected: 01/04/21 2204    Specimen: Blood Updated: 01/04/21 2315     Extra Tube Hold for add-ons.     Comment: Auto resulted.       Comprehensive Metabolic Panel [[id number]]  (Abnormal) Collected: 01/04/21 2204    Specimen: Blood Updated: 01/04/21 2300     Glucose 416 mg/dL      BUN 12 mg/dL      Creatinine 0.82 mg/dL      Sodium 133 mmol/L      Potassium 4.2 mmol/L      Comment: Slight hemolysis detected by analyzer. Results may be affected.        Chloride 95 mmol/L      CO2 28.0 mmol/L      Calcium 9.1 mg/dL      Total Protein 7.3 g/dL      Albumin 3.60 g/dL      ALT (SGPT) 19 U/L      AST (SGOT) 17 U/L      Comment: Slight hemolysis  detected by analyzer. Results may be affected.        Alkaline Phosphatase 88 U/L      Total Bilirubin 0.2 mg/dL      eGFR Non African Amer 103 mL/min/1.73      Globulin 3.7 gm/dL      A/G Ratio 1.0 g/dL      BUN/Creatinine Ratio 14.6     Anion Gap 10.0 mmol/L     Narrative:      GFR Normal >60  Chronic Kidney Disease <60  Kidney Failure <15      Troponin [[id number]]  (Normal) Collected: 01/04/21 2204    Specimen: Blood Updated: 01/04/21 2258     Troponin T <0.010 ng/mL     Narrative:      Troponin T Reference Range:  <= 0.03 ng/mL-   Negative for AMI  >0.03 ng/mL-     Abnormal for myocardial necrosis.  Clinicians would have to utilize clinical acumen, EKG, Troponin and serial changes to determine if it is an Acute Myocardial Infarction or myocardial injury due to an underlying chronic condition.       Results may be falsely decreased if patient taking Biotin.      BNP [[id number]]  (Normal) Collected: 01/04/21 2204    Specimen: Blood Updated: 01/04/21 2255     proBNP 75.2 pg/mL     Narrative:      Among patients with dyspnea, NT-proBNP is highly sensitive for the detection of acute congestive heart failure. In addition NT-proBNP of <300 pg/ml effectively rules out acute congestive heart failure with 99% negative predictive value.    Results may be falsely decreased if patient taking Biotin.      Protime-INR [[id number]]  (Normal) Collected: 01/04/21 2204    Specimen: Blood Updated: 01/04/21 2248     Protime 11.8 Seconds      INR 0.84    Narrative:      Therapeutic range for most indications is 2.0-3.0 INR,  or 2.5-3.5 for mechanical heart valves.    CBC & Differential [[id number]]  (Abnormal) Collected: 01/04/21 2204    Specimen: Blood Updated: 01/04/21 2218    Narrative:      The following orders were created for panel order CBC & Differential.  Procedure                               Abnormality         Status                     ---------                               -----------         ------                      CBC Auto Differential[[id number]]        Abnormal            Final result                 Please view results for these tests on the individual orders.    CBC Auto Differential [[id number]]  (Abnormal) Collected: 01/04/21 2204    Specimen: Blood Updated: 01/04/21 2218     WBC 7.79 10*3/mm3      RBC 4.87 10*6/mm3      Hemoglobin 13.6 g/dL      Hematocrit 40.3 %      MCV 82.8 fL      MCH 27.9 pg      MCHC 33.7 g/dL      RDW 13.2 %      RDW-SD 39.7 fl      MPV 8.8 fL      Platelets 194 10*3/mm3      Neutrophil % 63.4 %      Lymphocyte % 23.6 %      Monocyte % 8.1 %      Eosinophil % 2.6 %      Basophil % 0.5 %      Immature Grans % 1.8 %      Neutrophils, Absolute 4.94 10*3/mm3      Lymphocytes, Absolute 1.84 10*3/mm3      Monocytes, Absolute 0.63 10*3/mm3      Eosinophils, Absolute 0.20 10*3/mm3      Basophils, Absolute 0.04 10*3/mm3      Immature Grans, Absolute 0.14 10*3/mm3      nRBC 0.0 /100 WBC         Imaging Results (Most Recent)     Procedure Component Value Units Date/Time    XR Chest 1 View [[id number]] Collected: 01/04/21 2212     Updated: 01/04/21 2254    Narrative:      EXAM DESCRIPTION:     XR CHEST 1 VW    CLINICAL HISTORY:     Chest Pain triage protocol  Chest Pain Triage Protocol     COMPARISON:     12/19/2020    TECHNIQUE:    Single AP view of the chest    FINDINGS:     Cardiac silhouette is within normal limits. There is no focal  parenchymal or pleural disease. There is no acute osseous process  visualized.      Impression:        No evidence of acute cardiopulmonary disease.      Electronically signed by:  Kenneth Brush MD  1/4/2021 10:53 PM [facility]  Workstation: [id number]          Chief Complaint on Day of Discharge: None    Hospital Course:  The patient is a 40 y.o. male who presented to [facility] with chest pain.  He was found to be Covid positive.  Myocardial infarction was excluded using cardiac enzymes x3, EKG, and telemetry.  He had heart cath done in September which was  "unremarkable.  Lifestyle modification was again encouraged.  Patient did not was transiently on supplemental oxygen so remdesivir and Decadron were started.  He was able to be weaned off of his supplemental oxygen so remdesivir was discontinued.  Be sent home with 3 days of Decadron.    Condition on Discharge: Stable    Physical Exam on Discharge:  /78   Pulse 79   Temp 96.8 °F (36 °C) (Oral)   Resp 18   Ht 180.3 cm (71\")   Wt (!) 202 kg (444 lb 9.6 oz)   SpO2 97%   BMI 62.01 kg/m²      Physical Exam  Vitals signs reviewed.   Constitutional:       Appearance: He is well-developed. He is morbidly obese.      Comments: BMI 62.01   HENT:      Head: Normocephalic and atraumatic.   Eyes:      Pupils: Pupils are equal, round, and reactive to light.   Neck:      Musculoskeletal: Normal range of motion and neck supple.   Cardiovascular:      Rate and Rhythm: Normal rate and regular rhythm.      Heart sounds: Normal heart sounds. No murmur. No friction rub. No gallop.    Pulmonary:      Effort: Pulmonary effort is normal. No respiratory distress.      Breath sounds: Normal breath sounds. No wheezing or rales.   Chest:      Chest wall: No tenderness.   Abdominal:      General: Bowel sounds are normal. There is no distension.      Palpations: Abdomen is soft.      Tenderness: There is no abdominal tenderness.   Psychiatric:         Behavior: Behavior normal.     Discharge Disposition:  Home or Self Care    Discharge Medications:     Discharge Medications      New Medications      Instructions Start Date   dexamethasone 6 MG tablet  Commonly known as: DECADRON   6 mg, Oral, Daily With Breakfast         Continue These Medications      Instructions Start Date   albuterol sulfate  (90 Base) MCG/ACT inhaler  Commonly known as: PROVENTIL HFA;VENTOLIN HFA;PROAIR HFA   2 puffs, Inhalation, Every 4 Hours PRN      albuterol 1.25 MG/3ML nebulizer solution  Commonly known as: ACCUNEB   1.25 mg, Nebulization, Every 6 " "Hours PRN      atorvastatin 80 MG tablet  Commonly known as: LIPITOR   80 mg, Oral, Nightly      dilTIAZem  MG 24 hr capsule  Commonly known as: Cardizem CD   120 mg, Oral, Daily      fenofibrate micronized 134 MG capsule  Commonly known as: LOFIBRA   134 mg, Oral, Every Morning Before Breakfast      gabapentin 300 MG capsule  Commonly known as: NEURONTIN   600 mg, Oral, 2 Times Daily - RT      glucose monitor monitoring kit   1 each, Does not apply, As Needed      insulin glargine 100 UNIT/ML injection  Commonly known as: LANTUS, SEMGLEE   60 Units, Subcutaneous, Nightly      insulin lispro 100 UNIT/ML injection  Commonly known as: HumaLOG   15 Units, Subcutaneous, 3 Times Daily Before Meals      ipratropium 17 MCG/ACT inhaler  Commonly known as: ATROVENT HFA   2 puffs, Inhalation      isosorbide mononitrate 120 MG 24 hr tablet  Commonly known as: IMDUR   120 mg, Oral, Every Morning      lisinopril 40 MG tablet  Commonly known as: PRINIVIL,ZESTRIL   40 mg, Oral, Nightly      methocarbamol 500 MG tablet  Commonly known as: Robaxin   500 mg, Oral, 4 Times Daily      metoprolol succinate XL 50 MG 24 hr tablet  Commonly known as: TOPROL-XL   50 mg, Oral, Daily      Microlet Lancets misc   One touch delica lancets      nitroglycerin 0.4 MG SL tablet  Commonly known as: NITROSTAT   0.4 mg, Sublingual, Every 5 Minutes PRN      pantoprazole 40 MG EC tablet  Commonly known as: PROTONIX   40 mg, Oral, Nightly      pramipexole 1 MG tablet  Commonly known as: MIRAPEX   2 mg, Oral, Nightly      prasugrel 10 MG tablet  Commonly known as: EFFIENT   10 mg, Oral, Daily      ranolazine 1000 MG 12 hr tablet  Commonly known as: RANEXA   1,000 mg, Oral, Every 12 Hours Scheduled      ReliOn Insulin Syringe 31G X 15/64\" 0.5 ML misc  Generic drug: Insulin Syringe-Needle U-100   For use with insulin      rivaroxaban 20 MG tablet  Commonly known as: XARELTO   20 mg, Oral, Daily With Dinner      traZODone 300 MG tablet  Commonly known " as: DESYREL   300 mg, Oral, Every Night at Bedtime             Discharge Diet:   Diet Instructions     Advance Diet As Tolerated            Activity at Discharge:   Activity Instructions     Activity as Tolerated            Follow-up Appointments:   Future Appointments   Date Time Provider Department [facility]   1/8/2021 10:00 AM Mami Perez APRN [facility]   2/11/2021 11:45 AM Demetri Tejada MD [facility] None             This document has been electronically signed by Yordy Goncalves MD on January 6, 2021 13:14 CST      Time:  35 min

## 2021-01-07 ENCOUNTER — TRANSITIONAL CARE MANAGEMENT TELEPHONE ENCOUNTER (OUTPATIENT)
Dept: CALL CENTER | Facility: HOSPITAL | Age: 41
End: 2021-01-07

## 2021-01-07 NOTE — OUTREACH NOTE
Call Center TCM Note      Responses   [facility] patient discharged from?  [place]   Does the patient have one of the following disease processes/diagnoses(primary or secondary)?  COVID-19   COVID-19 underlying condition?  None   TCM attempt successful?  No   Unsuccessful attempts  Attempt 1   Discharge diagnosis  Chest pain,    COVID-19 virus infection          Betsy Vu LPN    1/7/2021, 09:50 EST

## 2021-01-07 NOTE — OUTREACH NOTE
Call Center TCM Note      Responses   [facility] patient discharged from?  [place]   Does the patient have one of the following disease processes/diagnoses(primary or secondary)?  COVID-19   COVID-19 underlying condition?  None   TCM attempt successful?  Yes   Call start time  1513   Call end time  1517   Discharge diagnosis  Chest pain,    COVID-19 virus infection   Meds reviewed with patient/caregiver?  Yes   Is the patient having any side effects they believe may be caused by any medication additions or changes?  No   Does the patient have all medications ordered at discharge?  Yes   Is the patient taking all medications as directed (includes completed medication regime)?  Yes   Does the patient have a primary care provider?   Yes   Comments regarding PCP  PCP FOLLOW UP APPOINTMENT IS TOMORROW 1/8/21   Does the patient have an appointment with their PCP or specialist within 7 days of discharge?  Yes   Has the patient kept scheduled appointments due by today?  N/A   Has home health visited the patient within 72 hours of discharge?  N/A   Did the patient receive a copy of their discharge instructions?  Yes   Did the patient receive a copy of COVID-19 specific instructions?  Yes   Nursing interventions  Reviewed instructions with patient   What is the patient's perception of their health status since discharge?  Improving   Does the patient have any of the following symptoms?  None   Nursing Interventions  Nurse provided patient education   Pulse Ox monitoring  None   Is the patient/caregiver able to teach back steps to recovery at home?  Set small, achievable goals for return to baseline health, Rest and rebuild strength, gradually increase activity, Make a list of questions for provider's appointment   If the patient is a current smoker, are they able to teach back resources for cessation?  Smoking cessation classes, Smoking cessation support groups   Is the patient/caregiver able to teach back the  hierarchy of who to call/visit for symptoms/problems? PCP, Specialist, Home health nurse, Urgent Care, ED, 911  Yes   TCM call completed?  Yes          Betsy Vu LPN    1/7/2021, 15:21 EST

## 2021-01-08 ENCOUNTER — READMISSION MANAGEMENT (OUTPATIENT)
Dept: CALL CENTER | Facility: HOSPITAL | Age: 41
End: 2021-01-08

## 2021-01-08 ENCOUNTER — OFFICE VISIT (OUTPATIENT)
Dept: FAMILY MEDICINE CLINIC | Facility: CLINIC | Age: 41
End: 2021-01-08

## 2021-01-08 DIAGNOSIS — E11.65 TYPE 2 DIABETES MELLITUS WITH HYPERGLYCEMIA, WITH LONG-TERM CURRENT USE OF INSULIN (HCC): ICD-10-CM

## 2021-01-08 DIAGNOSIS — I25.118 ATHEROSCLEROTIC HEART DISEASE OF NATIVE CORONARY ARTERY WITH OTHER FORMS OF ANGINA PECTORIS (HCC): ICD-10-CM

## 2021-01-08 DIAGNOSIS — Z79.4 TYPE 2 DIABETES MELLITUS WITH DIABETIC NEUROPATHY, WITH LONG-TERM CURRENT USE OF INSULIN (HCC): ICD-10-CM

## 2021-01-08 DIAGNOSIS — Z09 HOSPITAL DISCHARGE FOLLOW-UP: Primary | ICD-10-CM

## 2021-01-08 DIAGNOSIS — Z79.4 TYPE 2 DIABETES MELLITUS WITH HYPERGLYCEMIA, WITH LONG-TERM CURRENT USE OF INSULIN (HCC): ICD-10-CM

## 2021-01-08 DIAGNOSIS — E11.40 TYPE 2 DIABETES MELLITUS WITH DIABETIC NEUROPATHY, WITH LONG-TERM CURRENT USE OF INSULIN (HCC): ICD-10-CM

## 2021-01-08 PROCEDURE — 99443 PR PHYS/QHP TELEPHONE EVALUATION 21-30 MIN: CPT | Performed by: NURSE PRACTITIONER

## 2021-01-08 RX ORDER — GABAPENTIN 300 MG/1
600 CAPSULE ORAL
Qty: 120 CAPSULE | Refills: 0 | Status: SHIPPED | OUTPATIENT
Start: 2021-01-08 | End: 2021-02-02 | Stop reason: SDUPTHER

## 2021-01-08 RX ORDER — LANCETS
EACH MISCELLANEOUS
Qty: 100 EACH | Refills: 5 | Status: SHIPPED | OUTPATIENT
Start: 2021-01-08 | End: 2021-09-30 | Stop reason: SDUPTHER

## 2021-01-08 NOTE — PROGRESS NOTES
CC: Chest Pain (hospital f/u)      Subjective:  Yordy Hansen is a 40 y.o. male who presents for     Telephone visit because of pandemic. This is hospital follow up. Pt was admitted on January 4 and Discharged on January 6th. Pt was admitted with SOB and Chest pain. MI was excluded using cardiac enzymes x3, EKG, and telemetry. Heart Cath done September 2020 was unremarkable. Lifestyle modifications were encouraged. Pt was found to have COVID while in hospital and was given Remdesivir and Decadron. Was sent home on 3 days of Decadron. States has one more dose to take. States feels some better from when he was in the hospital.    DM- States sugar this morning was 460. Checks sugars at least once daily. Ranges 225-400. Average of 325. Takes Lantus 60 units at bedtime and Humalog 15 units with meals. Denies any low spells. States is due a diabetic eye exam.    Peripheral Neuropathy- continues to have problems with. States toes feel numb.Takes 600mg BID for.      The following portions of the patient's history were reviewed and updated as appropriate: allergies, current medications, past family history, past medical history, past social history, past surgical history and problem list.    Past Medical History:   Diagnosis Date   • CAD (coronary artery disease)    • Chronic back pain    • Chronic pulmonary embolism (CMS/HCC)    • Coronary artery disease    • Diabetes mellitus (CMS/HCC)    • Factor II deficiency (CMS/HCC)    • Fatty liver    • GERD (gastroesophageal reflux disease)    • Hyperlipidemia    • Hypertension    • Morbid obesity (CMS/[facility])    • RLS (restless legs syndrome)    • Seizures (CMS/[facility])    • Sleep apnea          Current Outpatient Medications:   •  albuterol (ACCUNEB) 1.25 MG/3ML nebulizer solution, Take 3 mL by nebulization Every 6 (Six) Hours As Needed for Wheezing., Disp: 100 vial, Rfl: 0  •  albuterol (PROVENTIL HFA;VENTOLIN HFA) 108 (90 BASE) MCG/ACT inhaler, Inhale 2 puffs Every 4 (Four) Hours  As Needed for Wheezing., Disp: , Rfl:   •  atorvastatin (LIPITOR) 80 MG tablet, Take 1 tablet by mouth Every Night., Disp: 90 tablet, Rfl: 3  •  dexamethasone (DECADRON) 6 MG tablet, Take 1 tablet by mouth Daily With Breakfast., Disp: 3 tablet, Rfl: 0  •  dilTIAZem CD (Cardizem CD) 120 MG 24 hr capsule, Take 1 capsule by mouth Daily., Disp: 90 capsule, Rfl: 0  •  fenofibrate micronized (LOFIBRA) 134 MG capsule, Take 1 capsule by mouth Every Morning Before Breakfast., Disp: 90 capsule, Rfl: 3  •  gabapentin (NEURONTIN) 300 MG capsule, Take 2 capsules by mouth 2 (Two) Times a Day., Disp: 120 capsule, Rfl: 0  •  glucose monitor monitoring kit, 1 each As Needed (check blood glucose 3x daily)., Disp: 1 each, Rfl: 3  •  insulin glargine (LANTUS) 100 UNIT/ML injection, Inject 60 Units under the skin into the appropriate area as directed Every Night., Disp: 50 mL, Rfl: 3  •  insulin lispro (HumaLOG) 100 UNIT/ML injection, Inject 15 Units under the skin into the appropriate area as directed 3 (Three) Times a Day Before Meals., Disp: 30 mL, Rfl: 3  •  ipratropium (ATROVENT HFA) 17 MCG/ACT inhaler, Inhale 2 puffs., Disp: , Rfl:   •  isosorbide mononitrate (IMDUR) 120 MG 24 hr tablet, Take 1 tablet by mouth Every Morning for 30 days., Disp: 30 tablet, Rfl: 0  •  lisinopril (PRINIVIL,ZESTRIL) 40 MG tablet, Take 1 tablet by mouth Every Night., Disp: 90 tablet, Rfl: 3  •  methocarbamol (Robaxin) 500 MG tablet, Take 1 tablet by mouth 4 (Four) Times a Day., Disp: 56 tablet, Rfl: 3  •  metoprolol succinate XL (TOPROL-XL) 50 MG 24 hr tablet, Take 1 tablet by mouth Daily., Disp: 90 tablet, Rfl: 3  •  Microlet Lancets misc, One touch delica lancets, Disp: 100 each, Rfl: 5  •  nitroglycerin (NITROSTAT) 0.4 MG SL tablet, Place 1 tablet under the tongue Every 5 (Five) Minutes As Needed for Chest Pain for up to 15 days., Disp: 25 tablet, Rfl: 6  •  pantoprazole (PROTONIX) 40 MG EC tablet, Take 1 tablet by mouth Every Night., Disp: 90  "tablet, Rfl: 3  •  pramipexole (MIRAPEX) 1 MG tablet, Take 2 tablets by mouth Every Night., Disp: 180 tablet, Rfl: 3  •  prasugrel (EFFIENT) 10 MG tablet, Take 1 tablet by mouth Daily., Disp: 90 tablet, Rfl: 3  •  ranolazine (RANEXA) 1000 MG 12 hr tablet, Take 1 tablet by mouth Every 12 (Twelve) Hours., Disp: 60 tablet, Rfl: 5  •  ReliOn Insulin Syringe 31G X 15/64\" 0.5 ML misc, For use with insulin, Disp: 100 each, Rfl: 5  •  rivaroxaban (XARELTO) 20 MG tablet, Take 1 tablet by mouth Daily With Dinner., Disp: 90 tablet, Rfl: 3  •  traZODone (DESYREL) 300 MG tablet, Take 1 tablet by mouth every night at bedtime., Disp: 90 tablet, Rfl: 3  •  glucose blood test strip, Use as instructed, Disp: 100 each, Rfl: 12  •  SITagliptin (Januvia) 100 MG tablet, Take 1 tablet by mouth Daily., Disp: 30 tablet, Rfl: 5    Review of Systems    Review of Systems   Constitutional: Negative.  Negative for fever.   HENT: Positive for congestion.    Eyes: Negative.    Respiratory: Positive for cough and shortness of breath.    Cardiovascular: Positive for chest pain.   Gastrointestinal: Positive for diarrhea.   Endocrine: Negative.    Genitourinary: Negative.    Musculoskeletal: Negative.    Skin: Negative.    Allergic/Immunologic: Negative.    Neurological: Positive for numbness.   Hematological: Negative.    Psychiatric/Behavioral: Negative.    All other systems reviewed and are negative.      Objective  There were no vitals filed for this visit.  There is no height or weight on file to calculate BMI.    Physical Exam    Physical Exam  Deferred, Telephone visit because of pandemic    Diagnoses and all orders for this visit:    1. Hospital discharge follow-up (Primary)    2. Type 2 diabetes mellitus with hyperglycemia, with long-term current use of insulin (CMS/[facility])  -     Microlet Lancets misc; One touch delica lancets  Dispense: 100 each; Refill: 5  -     glucose blood test strip; Use as instructed  Dispense: 100 each; Refill: 12  -    "  SITagliptin (Januvia) 100 MG tablet; Take 1 tablet by mouth Daily.  Dispense: 30 tablet; Refill: 5  -     Ambulatory Referral to Endocrinology  -     Hemoglobin A1c  -     MicroAlbumin, Urine, Random - Urine, Clean Catch; Future    3. Atherosclerotic heart disease of native coronary artery with other forms of angina pectoris (CMS/HCC)    4. Type 2 diabetes mellitus with diabetic neuropathy, with long-term current use of insulin (CMS/HCC)  -     gabapentin (NEURONTIN) 300 MG capsule; Take 2 capsules by mouth 2 (Two) Times a Day.  Dispense: 120 capsule; Refill: 0       Advised to increase meal time insulin to 25 units. Will have pt come in when COVID resolved and check and A1C and microalbumin. Add Januvia 100mg PO daily. Sent in prescription for Test strips and lancets. Since diabetes is uncontrolled with refer to Endocrinology for further evaluation and management. Refilled Neurontin. Will continue 600mg PO BID. To finish Decadron that was rx'd per hospital MD. To follow up in 3 months or sooner if needed.      This document has been electronically signed by LALA Munoz on January 8, 2021 11:14 CST     You have chosen to receive care through a telephone visit. Do you consent to use a telephone visit for your medical care today?  Yes  This visit has been rescheduled as a phone visit to comply with patient safety concerns in accordance with CDC recommendations. Total time of discussion was 23 minutes.

## 2021-01-08 NOTE — PATIENT INSTRUCTIONS
COVID-19  COVID-19 is a respiratory infection that is caused by a virus called severe acute respiratory syndrome coronavirus 2 (SARS-CoV-2). The disease is also known as coronavirus disease or novel coronavirus. In some people, the virus may not cause any symptoms. In others, it may cause a serious infection. The infection can get worse quickly and can lead to complications, such as:  · Pneumonia, or infection of the lungs.  · Acute respiratory distress syndrome or ARDS. This is a condition in which fluid build-up in the lungs prevents the lungs from filling with air and passing oxygen into the blood.  · Acute respiratory failure. This is a condition in which there is not enough oxygen passing from the lungs to the body or when carbon dioxide is not passing from the lungs out of the body.  · Sepsis or septic shock. This is a serious bodily reaction to an infection.  · Blood clotting problems.  · Secondary infections due to bacteria or fungus.  · Organ failure. This is when your body's organs stop working.  The virus that causes COVID-19 is contagious. This means that it can spread from person to person through droplets from coughs and sneezes (respiratory secretions).  What are the causes?  This illness is caused by a virus. You may catch the virus by:  · Breathing in droplets from an infected person. Droplets can be spread by a person breathing, speaking, singing, coughing, or sneezing.  · Touching something, like a table or a doorknob, that was exposed to the virus (contaminated) and then touching your mouth, nose, or eyes.  What increases the risk?  Risk for infection  You are more likely to be infected with this virus if you:  · Are within 6 feet (2 meters) of a person with COVID-19.  · Provide care for or live with a person who is infected with COVID-19.  · Spend time in crowded indoor spaces or live in shared housing.  Risk for serious illness  You are more likely to become seriously ill from the virus if  "you:  · Are 50 years of age or older. The higher your age, the more you are at risk for serious illness.  · Live in a nursing home or long-term care facility.  · Have cancer.  · Have a long-term (chronic) disease such as:  ? Chronic lung disease, including chronic obstructive pulmonary disease or asthma.  ? A long-term disease that lowers your body's ability to fight infection (immunocompromised).  ? Heart disease, including heart failure, a condition in which the arteries that lead to the heart become narrow or blocked (coronary artery disease), a disease which makes the heart muscle thick, weak, or stiff (cardiomyopathy).  ? Diabetes.  ? Chronic kidney disease.  ? Sickle cell disease, a condition in which red blood cells have an abnormal \"sickle\" shape.  ? Liver disease.  · Are obese.  What are the signs or symptoms?  Symptoms of this condition can range from mild to severe. Symptoms may appear any time from 2 to 14 days after being exposed to the virus. They include:  · A fever or chills.  · A cough.  · Difficulty breathing.  · Headaches, body aches, or muscle aches.  · Runny or stuffy (congested) nose.  · A sore throat.  · New loss of taste or smell.  Some people may also have stomach problems, such as nausea, vomiting, or diarrhea.  Other people may not have any symptoms of COVID-19.  How is this diagnosed?  This condition may be diagnosed based on:  · Your signs and symptoms, especially if:  ? You live in an area with a COVID-19 outbreak.  ? You recently traveled to or from an area where the virus is common.  ? You provide care for or live with a person who was diagnosed with COVID-19.  ? You were exposed to a person who was diagnosed with COVID-19.  · A physical exam.  · Lab tests, which may include:  ? Taking a sample of fluid from the back of your nose and throat (nasopharyngeal fluid), your nose, or your throat using a swab.  ? A sample of mucus from your lungs (sputum).  ? Blood tests.  · Imaging tests, " which may include, X-rays, CT scan, or ultrasound.  How is this treated?  At present, there is no medicine to treat COVID-19. Medicines that treat other diseases are being used on a trial basis to see if they are effective against COVID-19.  Your health care provider will talk with you about ways to treat your symptoms. For most people, the infection is mild and can be managed at home with rest, fluids, and over-the-counter medicines.  Treatment for a serious infection usually takes places in a hospital intensive care unit (ICU). It may include one or more of the following treatments. These treatments are given until your symptoms improve.  · Receiving fluids and medicines through an IV.  · Supplemental oxygen. Extra oxygen is given through a tube in the nose, a face mask, or a moser.  · Positioning you to lie on your stomach (prone position). This makes it easier for oxygen to get into the lungs.  · Continuous positive airway pressure (CPAP) or bi-level positive airway pressure (BPAP) machine. This treatment uses mild air pressure to keep the airways open. A tube that is connected to a motor delivers oxygen to the body.  · Ventilator. This treatment moves air into and out of the lungs by using a tube that is placed in your windpipe.  · Tracheostomy. This is a procedure to create a hole in the neck so that a breathing tube can be inserted.  · Extracorporeal membrane oxygenation (ECMO). This procedure gives the lungs a chance to recover by taking over the functions of the heart and lungs. It supplies oxygen to the body and removes carbon dioxide.  Follow these instructions at home:  Lifestyle  · If you are sick, stay home except to get medical care. Your health care provider will tell you how long to stay home. Call your health care provider before you go for medical care.  · Rest at home as told by your health care provider.  · Do not use any products that contain nicotine or tobacco, such as cigarettes,  e-cigarettes, and chewing tobacco. If you need help quitting, ask your health care provider.  · Return to your normal activities as told by your health care provider. Ask your health care provider what activities are safe for you.  General instructions  · Take over-the-counter and prescription medicines only as told by your health care provider.  · Drink enough fluid to keep your urine pale yellow.  · Keep all follow-up visits as told by your health care provider. This is important.  How is this prevented?    There is no vaccine to help prevent COVID-19 infection. However, there are steps you can take to protect yourself and others from this virus.  To protect yourself:   · Do not travel to areas where COVID-19 is a risk. The areas where COVID-19 is reported change often. To identify high-risk areas and travel restrictions, check the CDC travel website: wwwnc.cdc.gov/travel/notices  · If you live in, or must travel to, an area where COVID-19 is a risk, take precautions to avoid infection.  ? Stay away from people who are sick.  ? Wash your hands often with soap and water for 20 seconds. If soap and water are not available, use an alcohol-based hand .  ? Avoid touching your mouth, face, eyes, or nose.  ? Avoid going out in public, follow guidance from your state and local health authorities.  ? If you must go out in public, wear a cloth face covering or face mask. Make sure your mask covers your nose and mouth.  ? Avoid crowded indoor spaces. Stay at least 6 feet (2 meters) away from others.  ? Disinfect objects and surfaces that are frequently touched every day. This may include:  § Counters and tables.  § Doorknobs and light switches.  § Sinks and faucets.  § Electronics, such as phones, remote controls, keyboards, computers, and tablets.  To protect others:  If you have symptoms of COVID-19, take steps to prevent the virus from spreading to others.  · If you think you have a COVID-19 infection, contact  your health care provider right away. Tell your health care team that you think you may have a COVID-19 infection.  · Stay home. Leave your house only to seek medical care. Do not use public transport.  · Do not travel while you are sick.  · Wash your hands often with soap and water for 20 seconds. If soap and water are not available, use alcohol-based hand .  · Stay away from other members of your household. Let healthy household members care for children and pets, if possible. If you have to care for children or pets, wash your hands often and wear a mask. If possible, stay in your own room, separate from others. Use a different bathroom.  · Make sure that all people in your household wash their hands well and often.  · Cough or sneeze into a tissue or your sleeve or elbow. Do not cough or sneeze into your hand or into the air.  · Wear a cloth face covering or face mask. Make sure your mask covers your nose and mouth.  Where to find more information  · Centers for Disease Control and Prevention: www.cdc.gov/coronavirus/2019-ncov/index.html  · World Health Organization: www.who.int/health-topics/coronavirus  Contact a health care provider if:  · You live in or have traveled to an area where COVID-19 is a risk and you have symptoms of the infection.  · You have had contact with someone who has COVID-19 and you have symptoms of the infection.  Get help right away if:  · You have trouble breathing.  · You have pain or pressure in your chest.  · You have confusion.  · You have bluish lips and fingernails.  · You have difficulty waking from sleep.  · You have symptoms that get worse.  These symptoms may represent a serious problem that is an emergency. Do not wait to see if the symptoms will go away. Get medical help right away. Call your local emergency services (911 in the [place] Do not drive yourself to the hospital. Let the emergency medical personnel know if you think you have  COVID-19.  Summary  · COVID-19 is a respiratory infection that is caused by a virus. It is also known as coronavirus disease or novel coronavirus. It can cause serious infections, such as pneumonia, acute respiratory distress syndrome, acute respiratory failure, or sepsis.  · The virus that causes COVID-19 is contagious. This means that it can spread from person to person through droplets from breathing, speaking, singing, coughing, or sneezing.  · You are more likely to develop a serious illness if you are 50 years of age or older, have a weak immune system, live in a nursing home, or have chronic disease.  · There is no medicine to treat COVID-19. Your health care provider will talk with you about ways to treat your symptoms.  · Take steps to protect yourself and others from infection. Wash your hands often and disinfect objects and surfaces that are frequently touched every day. Stay away from people who are sick and wear a mask if you are sick.  This information is not intended to replace advice given to you by your health care provider. Make sure you discuss any questions you have with your health care provider.  Document Revised: 10/16/2020 Document Reviewed: 01/23/2020  Elsevier Patient Education © 2020 [employer]

## 2021-01-08 NOTE — OUTREACH NOTE
COVID-19 Week 1 Survey      Responses   [facility] patient discharged from?  [place]   Does the patient have one of the following disease processes/diagnoses(primary or secondary)?  COVID-19   COVID-19 underlying condition?  None   Call Number  Call 2   Week 1 Call successful?  No   Discharge diagnosis  Chest pain,    COVID-19 virus infection          Kecia Black RN

## 2021-01-09 ENCOUNTER — READMISSION MANAGEMENT (OUTPATIENT)
Dept: CALL CENTER | Facility: HOSPITAL | Age: 41
End: 2021-01-09

## 2021-01-09 NOTE — OUTREACH NOTE
COVID-19 Week 1 Survey      Responses   [facility] patient discharged from?  [place]   Does the patient have one of the following disease processes/diagnoses(primary or secondary)?  COVID-19   COVID-19 underlying condition?  None   Call Number  Call 3   Week 1 Call successful?  Yes   Call start time  [phone]   Call end time  [phone]   Discharge diagnosis  Chest pain,    COVID-19 virus infection   Person spoke with today (if not patient) and relationship  Hanna   Is the patient having any side effects they believe may be caused by any medication additions or changes?  No   Does the patient have all medications ordered at discharge?  Yes   Is the patient taking all medications as directed (includes completed medication regime)?  Yes   Nursing interventions  Reviewed instructions with patient   What is the patient's perception of their health status since discharge?  Improving   Does the patient have any of the following symptoms?  Shortness of breath [SOB with activity]   Nursing Interventions  Nurse provided patient education   Pulse Ox monitoring  None   Is the patient/caregiver able to teach back steps to recovery at home?  Eat a well-balance diet, Rest and rebuild strength, gradually increase activity   Is the patient/caregiver able to teach back the hierarchy of who to call/visit for symptoms/problems? PCP, Specialist, Home health nurse, Urgent Care, ED, 911  Yes   COVID-19 call completed?  Yes   Wrap up additional comments  Told Hanna if pt. has increased SOB, difficulty breathing to return to ED.           Theodora Glasgow RN

## 2021-01-11 ENCOUNTER — HOSPITAL ENCOUNTER (OUTPATIENT)
Facility: HOSPITAL | Age: 41
Setting detail: OBSERVATION
Discharge: HOME | End: 2021-01-13
Attending: EMERGENCY MEDICINE | Admitting: INTERNAL MEDICINE

## 2021-01-11 ENCOUNTER — APPOINTMENT (OUTPATIENT)
Dept: GENERAL RADIOLOGY | Facility: HOSPITAL | Age: 41
End: 2021-01-11

## 2021-01-11 DIAGNOSIS — G47.33 OSA ON CPAP: Chronic | ICD-10-CM

## 2021-01-11 DIAGNOSIS — E66.01 MORBID OBESITY (HCC): Chronic | ICD-10-CM

## 2021-01-11 DIAGNOSIS — Z79.4 TYPE 2 DIABETES MELLITUS WITHOUT COMPLICATION, WITH LONG-TERM CURRENT USE OF INSULIN (HCC): ICD-10-CM

## 2021-01-11 DIAGNOSIS — Z99.89 OSA ON CPAP: Chronic | ICD-10-CM

## 2021-01-11 DIAGNOSIS — R06.02 SHORTNESS OF BREATH: ICD-10-CM

## 2021-01-11 DIAGNOSIS — U07.1 COVID-19: Primary | ICD-10-CM

## 2021-01-11 DIAGNOSIS — R06.82 TACHYPNEA: ICD-10-CM

## 2021-01-11 DIAGNOSIS — E11.9 TYPE 2 DIABETES MELLITUS WITHOUT COMPLICATION, WITH LONG-TERM CURRENT USE OF INSULIN (HCC): ICD-10-CM

## 2021-01-11 LAB
ALBUMIN SERPL-MCNC: 3.6 G/DL (ref 3.5–5.2)
ALBUMIN/GLOB SERPL: 1.1 G/DL
ALP SERPL-CCNC: 84 U/L (ref 39–117)
ALT SERPL W P-5'-P-CCNC: 26 U/L (ref 1–41)
ANION GAP SERPL CALCULATED.3IONS-SCNC: 11 MMOL/L (ref 5–15)
AST SERPL-CCNC: 22 U/L (ref 1–40)
BASOPHILS # BLD AUTO: 0.09 10*3/MM3 (ref 0–0.2)
BASOPHILS NFR BLD AUTO: 0.7 % (ref 0–1.5)
BILIRUB SERPL-MCNC: 0.2 MG/DL (ref 0–1.2)
BUN SERPL-MCNC: 18 MG/DL (ref 6–20)
BUN/CREAT SERPL: 24 (ref 7–25)
CALCIUM SPEC-SCNC: 9.2 MG/DL (ref 8.6–10.5)
CHLORIDE SERPL-SCNC: 90 MMOL/L (ref 98–107)
CO2 SERPL-SCNC: 28 MMOL/L (ref 22–29)
CREAT SERPL-MCNC: 0.75 MG/DL (ref 0.76–1.27)
D-DIMER, QUANTITATIVE (MAD,POW, STR): 444 NG/ML (FEU) (ref 0–470)
D-LACTATE SERPL-SCNC: 3.2 MMOL/L (ref 0.5–2)
D-LACTATE SERPL-SCNC: 3.3 MMOL/L (ref 0.5–2)
DEPRECATED RDW RBC AUTO: 40.1 FL (ref 37–54)
EOSINOPHIL # BLD AUTO: 0.06 10*3/MM3 (ref 0–0.4)
EOSINOPHIL NFR BLD AUTO: 0.4 % (ref 0.3–6.2)
ERYTHROCYTE [DISTWIDTH] IN BLOOD BY AUTOMATED COUNT: 13.7 % (ref 12.3–15.4)
GFR SERPL CREATININE-BSD FRML MDRD: 114 ML/MIN/1.73
GLOBULIN UR ELPH-MCNC: 3.3 GM/DL
GLUCOSE SERPL-MCNC: 496 MG/DL (ref 65–99)
HCT VFR BLD AUTO: 44.4 % (ref 37.5–51)
HGB BLD-MCNC: 15.5 G/DL (ref 13–17.7)
HOLD SPECIMEN: NORMAL
HOLD SPECIMEN: NORMAL
IMM GRANULOCYTES # BLD AUTO: 0.48 10*3/MM3 (ref 0–0.05)
IMM GRANULOCYTES NFR BLD AUTO: 3.5 % (ref 0–0.5)
LACTATE HOLD SPECIMEN: NORMAL
LYMPHOCYTES # BLD AUTO: 1.07 10*3/MM3 (ref 0.7–3.1)
LYMPHOCYTES NFR BLD AUTO: 7.9 % (ref 19.6–45.3)
MCH RBC QN AUTO: 28.6 PG (ref 26.6–33)
MCHC RBC AUTO-ENTMCNC: 34.9 G/DL (ref 31.5–35.7)
MCV RBC AUTO: 81.9 FL (ref 79–97)
MONOCYTES # BLD AUTO: 0.38 10*3/MM3 (ref 0.1–0.9)
MONOCYTES NFR BLD AUTO: 2.8 % (ref 5–12)
NEUTROPHILS NFR BLD AUTO: 11.45 10*3/MM3 (ref 1.7–7)
NEUTROPHILS NFR BLD AUTO: 84.7 % (ref 42.7–76)
NRBC BLD AUTO-RTO: 0 /100 WBC (ref 0–0.2)
NT-PROBNP SERPL-MCNC: 33.5 PG/ML (ref 0–450)
PLATELET # BLD AUTO: 235 10*3/MM3 (ref 140–450)
PMV BLD AUTO: 8.9 FL (ref 6–12)
POTASSIUM SERPL-SCNC: 5.6 MMOL/L (ref 3.5–5.2)
PROT SERPL-MCNC: 6.9 G/DL (ref 6–8.5)
RBC # BLD AUTO: 5.42 10*6/MM3 (ref 4.14–5.8)
SODIUM SERPL-SCNC: 129 MMOL/L (ref 136–145)
TROPONIN T SERPL-MCNC: <0.01 NG/ML (ref 0–0.03)
WBC # BLD AUTO: 13.53 10*3/MM3 (ref 3.4–10.8)
WHOLE BLOOD HOLD SPECIMEN: NORMAL
WHOLE BLOOD HOLD SPECIMEN: NORMAL

## 2021-01-11 PROCEDURE — 87040 BLOOD CULTURE FOR BACTERIA: CPT | Performed by: EMERGENCY MEDICINE

## 2021-01-11 PROCEDURE — 82962 GLUCOSE BLOOD TEST: CPT

## 2021-01-11 PROCEDURE — 93005 ELECTROCARDIOGRAM TRACING: CPT | Performed by: EMERGENCY MEDICINE

## 2021-01-11 PROCEDURE — 99284 EMERGENCY DEPT VISIT MOD MDM: CPT

## 2021-01-11 PROCEDURE — G0378 HOSPITAL OBSERVATION PER HR: HCPCS

## 2021-01-11 PROCEDURE — 94799 UNLISTED PULMONARY SVC/PX: CPT

## 2021-01-11 PROCEDURE — 25010000002 DEXAMETHASONE PER 1 MG: Performed by: EMERGENCY MEDICINE

## 2021-01-11 PROCEDURE — 85025 COMPLETE CBC W/AUTO DIFF WBC: CPT | Performed by: EMERGENCY MEDICINE

## 2021-01-11 PROCEDURE — 84484 ASSAY OF TROPONIN QUANT: CPT | Performed by: EMERGENCY MEDICINE

## 2021-01-11 PROCEDURE — 93010 ELECTROCARDIOGRAM REPORT: CPT | Performed by: INTERNAL MEDICINE

## 2021-01-11 PROCEDURE — 80053 COMPREHEN METABOLIC PANEL: CPT | Performed by: EMERGENCY MEDICINE

## 2021-01-11 PROCEDURE — 83605 ASSAY OF LACTIC ACID: CPT | Performed by: EMERGENCY MEDICINE

## 2021-01-11 PROCEDURE — 83880 ASSAY OF NATRIURETIC PEPTIDE: CPT | Performed by: EMERGENCY MEDICINE

## 2021-01-11 PROCEDURE — 71045 X-RAY EXAM CHEST 1 VIEW: CPT

## 2021-01-11 PROCEDURE — 85379 FIBRIN DEGRADATION QUANT: CPT | Performed by: EMERGENCY MEDICINE

## 2021-01-11 PROCEDURE — 94640 AIRWAY INHALATION TREATMENT: CPT

## 2021-01-11 PROCEDURE — 25010000002 LORAZEPAM PER 2 MG: Performed by: EMERGENCY MEDICINE

## 2021-01-11 PROCEDURE — 96375 TX/PRO/DX INJ NEW DRUG ADDON: CPT

## 2021-01-11 PROCEDURE — 96374 THER/PROPH/DIAG INJ IV PUSH: CPT

## 2021-01-11 RX ORDER — SODIUM CHLORIDE, SODIUM LACTATE, POTASSIUM CHLORIDE, CALCIUM CHLORIDE 600; 310; 30; 20 MG/100ML; MG/100ML; MG/100ML; MG/100ML
100 INJECTION, SOLUTION INTRAVENOUS CONTINUOUS
Status: DISCONTINUED | OUTPATIENT
Start: 2021-01-11 | End: 2021-01-12

## 2021-01-11 RX ORDER — PANTOPRAZOLE SODIUM 40 MG/1
40 TABLET, DELAYED RELEASE ORAL NIGHTLY
Status: DISCONTINUED | OUTPATIENT
Start: 2021-01-11 | End: 2021-01-13 | Stop reason: HOSPADM

## 2021-01-11 RX ORDER — LISINOPRIL 40 MG/1
40 TABLET ORAL NIGHTLY
Status: DISCONTINUED | OUTPATIENT
Start: 2021-01-11 | End: 2021-01-13 | Stop reason: HOSPADM

## 2021-01-11 RX ORDER — SODIUM CHLORIDE 0.9 % (FLUSH) 0.9 %
10 SYRINGE (ML) INJECTION EVERY 12 HOURS SCHEDULED
Status: DISCONTINUED | OUTPATIENT
Start: 2021-01-11 | End: 2021-01-13 | Stop reason: HOSPADM

## 2021-01-11 RX ORDER — DEXTROSE MONOHYDRATE 25 G/50ML
25 INJECTION, SOLUTION INTRAVENOUS
Status: DISCONTINUED | OUTPATIENT
Start: 2021-01-11 | End: 2021-01-13 | Stop reason: HOSPADM

## 2021-01-11 RX ORDER — DEXAMETHASONE SODIUM PHOSPHATE 4 MG/ML
6 INJECTION, SOLUTION INTRA-ARTICULAR; INTRALESIONAL; INTRAMUSCULAR; INTRAVENOUS; SOFT TISSUE ONCE
Status: COMPLETED | OUTPATIENT
Start: 2021-01-11 | End: 2021-01-11

## 2021-01-11 RX ORDER — SODIUM CHLORIDE 0.9 % (FLUSH) 0.9 %
10 SYRINGE (ML) INJECTION AS NEEDED
Status: DISCONTINUED | OUTPATIENT
Start: 2021-01-11 | End: 2021-01-11

## 2021-01-11 RX ORDER — SODIUM CHLORIDE 0.9 % (FLUSH) 0.9 %
10 SYRINGE (ML) INJECTION AS NEEDED
Status: DISCONTINUED | OUTPATIENT
Start: 2021-01-11 | End: 2021-01-13 | Stop reason: HOSPADM

## 2021-01-11 RX ORDER — PRASUGREL 10 MG/1
10 TABLET, FILM COATED ORAL DAILY
Status: DISCONTINUED | OUTPATIENT
Start: 2021-01-12 | End: 2021-01-13 | Stop reason: HOSPADM

## 2021-01-11 RX ORDER — GABAPENTIN 300 MG/1
600 CAPSULE ORAL 2 TIMES DAILY
Status: DISCONTINUED | OUTPATIENT
Start: 2021-01-11 | End: 2021-01-13 | Stop reason: HOSPADM

## 2021-01-11 RX ORDER — METHOCARBAMOL 500 MG/1
500 TABLET, FILM COATED ORAL 4 TIMES DAILY
Status: DISCONTINUED | OUTPATIENT
Start: 2021-01-11 | End: 2021-01-13 | Stop reason: HOSPADM

## 2021-01-11 RX ORDER — ISOSORBIDE MONONITRATE 60 MG/1
120 TABLET, EXTENDED RELEASE ORAL EVERY MORNING
Status: DISCONTINUED | OUTPATIENT
Start: 2021-01-12 | End: 2021-01-13 | Stop reason: HOSPADM

## 2021-01-11 RX ORDER — NICOTINE POLACRILEX 4 MG
15 LOZENGE BUCCAL
Status: DISCONTINUED | OUTPATIENT
Start: 2021-01-11 | End: 2021-01-13 | Stop reason: HOSPADM

## 2021-01-11 RX ORDER — ALBUTEROL SULFATE 90 UG/1
2 AEROSOL, METERED RESPIRATORY (INHALATION)
Status: DISCONTINUED | OUTPATIENT
Start: 2021-01-11 | End: 2021-01-13 | Stop reason: HOSPADM

## 2021-01-11 RX ORDER — METOPROLOL SUCCINATE 50 MG/1
50 TABLET, EXTENDED RELEASE ORAL DAILY
Status: DISCONTINUED | OUTPATIENT
Start: 2021-01-12 | End: 2021-01-13 | Stop reason: HOSPADM

## 2021-01-11 RX ORDER — TRAZODONE HYDROCHLORIDE 150 MG/1
300 TABLET ORAL NIGHTLY PRN
Status: DISCONTINUED | OUTPATIENT
Start: 2021-01-11 | End: 2021-01-13 | Stop reason: HOSPADM

## 2021-01-11 RX ORDER — PRAMIPEXOLE DIHYDROCHLORIDE 1 MG/1
2 TABLET ORAL NIGHTLY
Status: DISCONTINUED | OUTPATIENT
Start: 2021-01-11 | End: 2021-01-13 | Stop reason: HOSPADM

## 2021-01-11 RX ORDER — RANOLAZINE 500 MG/1
1000 TABLET, EXTENDED RELEASE ORAL EVERY 12 HOURS SCHEDULED
Status: DISCONTINUED | OUTPATIENT
Start: 2021-01-11 | End: 2021-01-13 | Stop reason: HOSPADM

## 2021-01-11 RX ORDER — DILTIAZEM HYDROCHLORIDE 120 MG/1
120 CAPSULE, COATED, EXTENDED RELEASE ORAL DAILY
Status: DISCONTINUED | OUTPATIENT
Start: 2021-01-12 | End: 2021-01-13 | Stop reason: HOSPADM

## 2021-01-11 RX ORDER — ACETAMINOPHEN 325 MG/1
650 TABLET ORAL EVERY 4 HOURS PRN
Status: DISCONTINUED | OUTPATIENT
Start: 2021-01-11 | End: 2021-01-13 | Stop reason: HOSPADM

## 2021-01-11 RX ORDER — LORAZEPAM 2 MG/ML
1 INJECTION INTRAMUSCULAR ONCE
Status: COMPLETED | OUTPATIENT
Start: 2021-01-11 | End: 2021-01-11

## 2021-01-11 RX ORDER — ATORVASTATIN CALCIUM 40 MG/1
80 TABLET, FILM COATED ORAL NIGHTLY
Status: DISCONTINUED | OUTPATIENT
Start: 2021-01-11 | End: 2021-01-13 | Stop reason: HOSPADM

## 2021-01-11 RX ADMIN — IPRATROPIUM BROMIDE 2 PUFF: 17 AEROSOL, METERED RESPIRATORY (INHALATION) at 21:39

## 2021-01-11 RX ADMIN — LORAZEPAM 1 MG: 2 INJECTION INTRAMUSCULAR; INTRAVENOUS at 18:30

## 2021-01-11 RX ADMIN — DEXAMETHASONE SODIUM PHOSPHATE 6 MG: 4 INJECTION, SOLUTION INTRAMUSCULAR; INTRAVENOUS at 18:30

## 2021-01-11 NOTE — ED PROVIDER NOTES
Subjective   40-year-old morbidly obese male well-known to this emergency department for history of chest pain and shortness of breath as well as known coronary artery disease, hypertension hyperlipidemia presents emergency department with complaint of worsening shortness of breath and chest pain.  Recently admitted for chest pain rule out and was found to be Covid positive.  Was discharged home after transient hypoxia resolved.  Does not wear oxygen at home.  Denies any history of COPD but does have a history of pulmonary embolism.  Currently on anticoagulant.  Reports that his pain and shortness of breath is no different but does seem to be getting worse.  Reports he has been using his Atrovent inhaler and his Decadron at home without improvement.    Family history, surgical history, social history, current medications and allergies are reviewed with the patient and triage documentation and vitals are reviewed.      History provided by:  Patient and medical records   used: No        Review of Systems   Constitutional: Negative for chills and fever.   HENT: Negative for congestion and sore throat.    Eyes: Negative for photophobia and visual disturbance.   Respiratory: Positive for cough, chest tightness, shortness of breath and wheezing.    Cardiovascular: Positive for chest pain. Negative for palpitations and leg swelling.   Gastrointestinal: Negative for abdominal pain, diarrhea, nausea and vomiting.   Endocrine: Negative for polydipsia, polyphagia and polyuria.   Genitourinary: Negative for frequency, hematuria and urgency.   Musculoskeletal: Negative for back pain and neck pain.   Skin: Negative for color change, pallor, rash and wound.   Allergic/Immunologic: Negative.    Neurological: Negative for weakness, light-headedness, numbness and headaches.   Hematological: Negative.    Psychiatric/Behavioral: The patient is nervous/anxious.        Past Medical History:   Diagnosis Date   • CAD  (coronary artery disease)    • Chronic back pain    • Chronic pulmonary embolism (CMS/HCC)    • Coronary artery disease    • Diabetes mellitus (CMS/HCC)    • Factor II deficiency (CMS/HCC)    • Fatty liver    • GERD (gastroesophageal reflux disease)    • Hyperlipidemia    • Hypertension    • Morbid obesity (CMS/HCC)    • RLS (restless legs syndrome)    • Seizures (CMS/HCC)    • Sleep apnea        Allergies   Allergen Reactions   • Glipizide Other (See Comments) and Unknown (See Comments)     Slurred Speech  Slurred Speech  Hallucinations, Slurred Speech   • Reglan [Metoclopramide] Anxiety   • Tramadol Other (See Comments)     seizures   • Risperidone Other (See Comments)     Slurred speech  Can't stand, trouble breathing, slurred speech         Past Surgical History:   Procedure Laterality Date   • CARDIAC CATHETERIZATION N/A 3/15/2017   • CARDIAC CATHETERIZATION N/A 3/15/2017   • CARDIAC CATHETERIZATION N/A 3/1/2018   • CARDIAC CATHETERIZATION N/A 2020   • CHOLECYSTECTOMY     • CORONARY ANGIOPLASTY WITH STENT PLACEMENT     • [place] RT/LT HEART CATHETERS N/A 3/15/2017   • TRANSESOPHAGEAL ECHOCARDIOGRAM ([place])         Family History   Problem Relation Age of Onset   • Heart disease Mother    • Obesity Mother    • Sleep apnea Father    • Cancer Paternal Grandfather        Social History     Socioeconomic History   • Marital status:      Spouse name: Cori   • Number of children: 0   • Years of education: Not on file   • Highest education level: Not on file   Occupational History   • Occupation: Disabled   Tobacco Use   • Smoking status: Former Smoker     Packs/day: 0.25     Years: 0.50     Pack years: 0.12     Types: Cigarettes     Quit date:      Years since quittin.0   • Smokeless tobacco: Current User     Types: Snuff   Substance and Sexual Activity   • Alcohol use: No   • Drug use: No   • Sexual activity: Not Currently           Objective   Physical Exam  Vitals signs and nursing note reviewed.    Constitutional:       General: He is not in acute distress.     Appearance: He is well-developed. He is obese. He is not ill-appearing, toxic-appearing or diaphoretic.   HENT:      Head: Normocephalic.   Eyes:      Pupils: Pupils are equal, round, and reactive to light.   Neck:      Musculoskeletal: Normal range of motion and neck supple.   Cardiovascular:      Rate and Rhythm: Normal rate and regular rhythm.  No extrasystoles are present.     Heart sounds: Normal heart sounds. No murmur.   Pulmonary:      Effort: Tachypnea and accessory muscle usage present.      Breath sounds: Examination of the right-upper field reveals wheezing. Examination of the left-upper field reveals wheezing. Examination of the right-lower field reveals decreased breath sounds and wheezing. Examination of the left-lower field reveals decreased breath sounds and wheezing. Decreased breath sounds and wheezing present. No rhonchi or rales.   Chest:      Chest wall: No tenderness or crepitus.   Abdominal:      General: Bowel sounds are normal.      Palpations: Abdomen is soft.   Musculoskeletal: Normal range of motion.      Right lower leg: He exhibits no tenderness. No edema.      Left lower leg: He exhibits no tenderness. No edema.   Skin:     General: Skin is warm and dry.      Capillary Refill: Capillary refill takes less than 2 seconds.   Neurological:      General: No focal deficit present.      Mental Status: He is alert and oriented to person, place, and time.   Psychiatric:         Mood and Affect: Mood is anxious.         Behavior: Behavior normal.         Procedures  none         ED Course      Labs Reviewed   COMPREHENSIVE METABOLIC PANEL - Abnormal; Notable for the following components:       Result Value    Glucose 496 (*)     Creatinine 0.75 (*)     Sodium 129 (*)     Potassium 5.6 (*)     Chloride 90 (*)     All other components within normal limits    Narrative:     GFR Normal >60  Chronic Kidney Disease <60  Kidney Failure  <15     LACTIC ACID, PLASMA - Abnormal; Notable for the following components:    Lactate 3.2 (*)     All other components within normal limits   CBC WITH AUTO DIFFERENTIAL - Abnormal; Notable for the following components:    WBC 13.53 (*)     Neutrophil % 84.7 (*)     Lymphocyte % 7.9 (*)     Monocyte % 2.8 (*)     Immature Grans % 3.5 (*)     Neutrophils, Absolute 11.45 (*)     Immature Grans, Absolute 0.48 (*)     All other components within normal limits   LACTIC ACID, REFLEX - Abnormal; Notable for the following components:    Lactate 3.3 (*)     All other components within normal limits   TROPONIN (IN-HOUSE) - Normal    Narrative:     Troponin T Reference Range:  <= 0.03 ng/mL-   Negative for AMI  >0.03 ng/mL-     Abnormal for myocardial necrosis.  Clinicians would have to utilize clinical acumen, EKG, Troponin and serial changes to determine if it is an Acute Myocardial Infarction or myocardial injury due to an underlying chronic condition.       Results may be falsely decreased if patient taking Biotin.     BNP (IN-HOUSE) - Normal    Narrative:     Among patients with dyspnea, NT-proBNP is highly sensitive for the detection of acute congestive heart failure. In addition NT-proBNP of <300 pg/ml effectively rules out acute congestive heart failure with 99% negative predictive value.    Results may be falsely decreased if patient taking Biotin.     D-DIMER, QUANTITATIVE - Normal    Narrative:     Dimer values <500 ng/ml FEU are FDA approved as aid in diagnosis of deep venous thrombosis and pulmonary embolism.  This test should not be used in an exclusion strategy with pretest probability alone.    A recent guideline regarding diagnosis for pulmonary thromboembolism recommends an adjusted exclusion criterion of age x 10 ng/ml FEU for patients >50 years of age ([employer]ori Intern Med 2015; 163: 701-711).     BLOOD CULTURE   BLOOD CULTURE   RAINBOW DRAW    Narrative:     The following orders were created for panel order  [place] Draw.  Procedure                               Abnormality         Status                     ---------                               -----------         ------                     Light Blue Top[[id number]]                                   Final result               Green Top (Gel)[[id number]]                                  Final result               Lavender Top[[id number]]                                     Final result               Gold Top - SST[[id number]]                                   Final result                 Please view results for these tests on the individual orders.   LACTIC ACID REFLEX TIMER   BLOOD GAS, ARTERIAL   CBC AND DIFFERENTIAL    Narrative:     The following orders were created for panel order CBC & Differential.  Procedure                               Abnormality         Status                     ---------                               -----------         ------                     CBC Auto Differential[[id number]]        Abnormal            Final result                 Please view results for these tests on the individual orders.   LIGHT BLUE TOP   GREEN TOP   LAVENDER TOP   GOLD TOP - SST     Xr Chest 1 View    Result Date: 1/11/2021  Narrative: PROCEDURE: XR CHEST 1 VW VIEWS:Single INDICATION: Sepsis COMPARISON: CXR: 1/4/2021 FINDINGS:   - lines/tubes: None   - cardiac: Borderline size.   - mediastinum: Contour within normal limits.   - lungs: No evidence of a focal air space process, pulmonary interstitial edema, nodule(s)/mass. Mild streaky bibasilar opacities probably represent atelectasis   - pleura: No evidence of  fluid.    - osseous: Unremarkable for age.     Impression: Very mild streaky bibasilar opacity, favored to represent atelectasis. Electronically signed by:  Gela Shaffer MD  1/11/2021 6:14 PM CST Workstation: [id number]    Xr Chest 1 View    Result Date: 1/4/2021  Narrative: EXAM DESCRIPTION: XR CHEST 1 VW CLINICAL HISTORY: Chest Pain triage protocol Chest  Pain Triage Protocol COMPARISON: 12/19/2020 TECHNIQUE: Single AP view of the chest FINDINGS: Cardiac silhouette is within normal limits. There is no focal parenchymal or pleural disease. There is no acute osseous process visualized.     Impression: No evidence of acute cardiopulmonary disease. Electronically signed by:  Kenneth Brush MD  1/4/2021 10:53 PM CST Workstation: [id number]    Xr Chest 1 View    Result Date: 12/20/2020  Narrative: EXAM DESCRIPTION:  XR CHEST 1 VW CLINICAL HISTORY: 40 years  Male  Chest pain protocol chest pain protocol COMPARISON: August 29, 2020 TECHNIQUE: AP view of the chest was obtained. FINDINGS: Cardiac size is within normal limits. Central vessels are not increased. Patchy airspace opacities in the lower lungs bilaterally. No effusions bilaterally. No pneumothorax.     Impression: Atelectatic change versus infiltrate lower lungs bilaterally. Electronically signed by:  Giulia Aragon MD  12/20/2020 12:23 AM CST Workstation: [id number]    EKG January 11, 2021 at 1635 reveals normal sinus rhythm at a rate of 84 bpm.  No ST elevation or depression.  T wave flattening in aVL without inversion.  Unchanged from previous EKG.  No evidence of acute ischemia.          MDM  Number of Diagnoses or Management Options     Amount and/or Complexity of Data Reviewed  Clinical lab tests: reviewed  Tests in the medicine section of CPT®: reviewed    Patient Progress  Patient progress: stable    Patient is no longer a candidate for antibody transfusion given his recent hospitalization and receiving remdesivir.  He is also now outside the window for further remdesivir.  He was given Decadron while in the emergency department and has been on Decadron at home.  Patient with findings of hyperglycemia, pseudohyponatremia, equivocal lactic acid and linear atelectasis on chest x-ray.  He remains tachypneic and complaining of difficulty breathing and chest discomfort throughout his stay.  With ambulation oxygen level  remains around 96% without oxygen supplementation.  Patient is morbidly obese and high risk with known Covid.  Spoke at length with the hospitalist regarding this patient who agrees to observation.    Final diagnoses:   COVID-19   Shortness of breath   Tachypnea            Gm Goncalves,   01/11/21 2672

## 2021-01-11 NOTE — PLAN OF CARE
Problem: Patient Care Overview  Goal: Plan of Care Review  Outcome: Ongoing (interventions implemented as appropriate)   01/01/19 0113   Plan of Care Review   Progress no change   Coping/Psychosocial   Plan of Care Reviewed With patient     Goal: Individualization and Mutuality  Outcome: Ongoing (interventions implemented as appropriate)    Goal: Discharge Needs Assessment  Outcome: Ongoing (interventions implemented as appropriate)      Problem: Sepsis/Septic Shock (Adult)  Goal: Signs and Symptoms of Listed Potential Problems Will be Absent, Minimized or Managed (Sepsis/Septic Shock)  Outcome: Ongoing (interventions implemented as appropriate)      Problem: Breathing Pattern Ineffective (Adult)  Goal: Effective Oxygenation/Ventilation  Outcome: Ongoing (interventions implemented as appropriate)    Goal: Anxiety/Fear Reduction  Outcome: Ongoing (interventions implemented as appropriate)      Problem: Chronic Obstructive Pulmonary Disease (Adult)  Goal: Signs and Symptoms of Listed Potential Problems Will be Absent, Minimized or Managed (Chronic Obstructive Pulmonary Disease)  Outcome: Ongoing (interventions implemented as appropriate)      Problem: Hyperglycemia, Persistent (Adult)  Goal: Signs and Symptoms of Listed Potential Problems Will be Absent, Minimized or Managed (Hyperglycemia, Persistent)  Outcome: Ongoing (interventions implemented as appropriate)        
Problem: Patient Care Overview  Goal: Plan of Care Review  Outcome: Ongoing (interventions implemented as appropriate)   12/31/18 1933   Plan of Care Review   Progress no change   OTHER   Outcome Summary pt has been alternating between roomair or bipap (when napping). ambulated in pedroza and oxygen sats stayed 95-98% on roomair. Audible wheezes noted when pt ambulating. will continue to monitor.    Coping/Psychosocial   Plan of Care Reviewed With patient     Goal: Individualization and Mutuality  Outcome: Ongoing (interventions implemented as appropriate)    Goal: Discharge Needs Assessment  Outcome: Ongoing (interventions implemented as appropriate)    Goal: Interprofessional Rounds/Family Conf  Outcome: Ongoing (interventions implemented as appropriate)      Problem: Sepsis/Septic Shock (Adult)  Goal: Signs and Symptoms of Listed Potential Problems Will be Absent, Minimized or Managed (Sepsis/Septic Shock)  Outcome: Ongoing (interventions implemented as appropriate)      Problem: Breathing Pattern Ineffective (Adult)  Goal: Effective Oxygenation/Ventilation  Outcome: Ongoing (interventions implemented as appropriate)    Goal: Anxiety/Fear Reduction  Outcome: Ongoing (interventions implemented as appropriate)      Problem: Chronic Obstructive Pulmonary Disease (Adult)  Goal: Signs and Symptoms of Listed Potential Problems Will be Absent, Minimized or Managed (Chronic Obstructive Pulmonary Disease)  Outcome: Ongoing (interventions implemented as appropriate)      Problem: Hyperglycemia, Persistent (Adult)  Goal: Signs and Symptoms of Listed Potential Problems Will be Absent, Minimized or Managed (Hyperglycemia, Persistent)  Outcome: Ongoing (interventions implemented as appropriate)        
within normal limits

## 2021-01-12 ENCOUNTER — READMISSION MANAGEMENT (OUTPATIENT)
Dept: CALL CENTER | Facility: HOSPITAL | Age: 41
End: 2021-01-12

## 2021-01-12 LAB
ANION GAP SERPL CALCULATED.3IONS-SCNC: 11 MMOL/L (ref 5–15)
BASOPHILS # BLD AUTO: 0.04 10*3/MM3 (ref 0–0.2)
BASOPHILS NFR BLD AUTO: 0.3 % (ref 0–1.5)
BUN SERPL-MCNC: 24 MG/DL (ref 6–20)
BUN/CREAT SERPL: 27 (ref 7–25)
CALCIUM SPEC-SCNC: 9.4 MG/DL (ref 8.6–10.5)
CHLORIDE SERPL-SCNC: 94 MMOL/L (ref 98–107)
CO2 SERPL-SCNC: 25 MMOL/L (ref 22–29)
CREAT SERPL-MCNC: 0.89 MG/DL (ref 0.76–1.27)
D-LACTATE SERPL-SCNC: 3.4 MMOL/L (ref 0.5–2)
D-LACTATE SERPL-SCNC: 4 MMOL/L (ref 0.5–2)
DEPRECATED RDW RBC AUTO: 41.5 FL (ref 37–54)
EOSINOPHIL # BLD AUTO: 0.02 10*3/MM3 (ref 0–0.4)
EOSINOPHIL NFR BLD AUTO: 0.1 % (ref 0.3–6.2)
ERYTHROCYTE [DISTWIDTH] IN BLOOD BY AUTOMATED COUNT: 13.7 % (ref 12.3–15.4)
GFR SERPL CREATININE-BSD FRML MDRD: 94 ML/MIN/1.73
GLUCOSE BLDC GLUCOMTR-MCNC: 297 MG/DL (ref 70–130)
GLUCOSE BLDC GLUCOMTR-MCNC: 332 MG/DL (ref 70–130)
GLUCOSE BLDC GLUCOMTR-MCNC: 344 MG/DL (ref 70–130)
GLUCOSE BLDC GLUCOMTR-MCNC: 374 MG/DL (ref 70–130)
GLUCOSE SERPL-MCNC: 363 MG/DL (ref 65–99)
HCT VFR BLD AUTO: 42 % (ref 37.5–51)
HGB BLD-MCNC: 13.9 G/DL (ref 13–17.7)
IMM GRANULOCYTES # BLD AUTO: 0.42 10*3/MM3 (ref 0–0.05)
IMM GRANULOCYTES NFR BLD AUTO: 2.9 % (ref 0–0.5)
LYMPHOCYTES # BLD AUTO: 1.58 10*3/MM3 (ref 0.7–3.1)
LYMPHOCYTES NFR BLD AUTO: 10.8 % (ref 19.6–45.3)
MCH RBC QN AUTO: 27.7 PG (ref 26.6–33)
MCHC RBC AUTO-ENTMCNC: 33.1 G/DL (ref 31.5–35.7)
MCV RBC AUTO: 83.8 FL (ref 79–97)
MONOCYTES # BLD AUTO: 0.67 10*3/MM3 (ref 0.1–0.9)
MONOCYTES NFR BLD AUTO: 4.6 % (ref 5–12)
NEUTROPHILS NFR BLD AUTO: 11.89 10*3/MM3 (ref 1.7–7)
NEUTROPHILS NFR BLD AUTO: 81.3 % (ref 42.7–76)
NRBC BLD AUTO-RTO: 0 /100 WBC (ref 0–0.2)
PLATELET # BLD AUTO: 209 10*3/MM3 (ref 140–450)
PMV BLD AUTO: 9 FL (ref 6–12)
POTASSIUM SERPL-SCNC: 4.8 MMOL/L (ref 3.5–5.2)
PROCALCITONIN SERPL-MCNC: 0.06 NG/ML (ref 0–0.25)
RBC # BLD AUTO: 5.01 10*6/MM3 (ref 4.14–5.8)
SODIUM SERPL-SCNC: 130 MMOL/L (ref 136–145)
TROPONIN T SERPL-MCNC: <0.01 NG/ML (ref 0–0.03)
WBC # BLD AUTO: 14.62 10*3/MM3 (ref 3.4–10.8)

## 2021-01-12 PROCEDURE — 85025 COMPLETE CBC W/AUTO DIFF WBC: CPT | Performed by: INTERNAL MEDICINE

## 2021-01-12 PROCEDURE — 94799 UNLISTED PULMONARY SVC/PX: CPT

## 2021-01-12 PROCEDURE — G0378 HOSPITAL OBSERVATION PER HR: HCPCS

## 2021-01-12 PROCEDURE — 83605 ASSAY OF LACTIC ACID: CPT | Performed by: INTERNAL MEDICINE

## 2021-01-12 PROCEDURE — 80048 BASIC METABOLIC PNL TOTAL CA: CPT | Performed by: INTERNAL MEDICINE

## 2021-01-12 PROCEDURE — 63710000001 INSULIN ASPART PER 5 UNITS: Performed by: INTERNAL MEDICINE

## 2021-01-12 PROCEDURE — 84484 ASSAY OF TROPONIN QUANT: CPT | Performed by: INTERNAL MEDICINE

## 2021-01-12 PROCEDURE — 84145 PROCALCITONIN (PCT): CPT | Performed by: INTERNAL MEDICINE

## 2021-01-12 PROCEDURE — 63710000001 INSULIN DETEMIR PER 5 UNITS: Performed by: INTERNAL MEDICINE

## 2021-01-12 PROCEDURE — 82962 GLUCOSE BLOOD TEST: CPT

## 2021-01-12 PROCEDURE — 93010 ELECTROCARDIOGRAM REPORT: CPT | Performed by: INTERNAL MEDICINE

## 2021-01-12 PROCEDURE — 96361 HYDRATE IV INFUSION ADD-ON: CPT

## 2021-01-12 PROCEDURE — 93005 ELECTROCARDIOGRAM TRACING: CPT | Performed by: INTERNAL MEDICINE

## 2021-01-12 PROCEDURE — 63710000001 DEXAMETHASONE PER 0.25 MG: Performed by: INTERNAL MEDICINE

## 2021-01-12 RX ORDER — HYDROCODONE BITARTRATE AND ACETAMINOPHEN 5; 325 MG/1; MG/1
1 TABLET ORAL EVERY 8 HOURS PRN
Status: DISCONTINUED | OUTPATIENT
Start: 2021-01-12 | End: 2021-01-13 | Stop reason: HOSPADM

## 2021-01-12 RX ADMIN — METHOCARBAMOL 500 MG: 500 TABLET, FILM COATED ORAL at 20:48

## 2021-01-12 RX ADMIN — ALBUTEROL SULFATE 2 PUFF: 90 AEROSOL, METERED RESPIRATORY (INHALATION) at 12:45

## 2021-01-12 RX ADMIN — METHOCARBAMOL 500 MG: 500 TABLET, FILM COATED ORAL at 11:19

## 2021-01-12 RX ADMIN — METHOCARBAMOL 500 MG: 500 TABLET, FILM COATED ORAL at 08:48

## 2021-01-12 RX ADMIN — IPRATROPIUM BROMIDE 2 PUFF: 17 AEROSOL, METERED RESPIRATORY (INHALATION) at 19:19

## 2021-01-12 RX ADMIN — DEXAMETHASONE 6 MG: 2 TABLET ORAL at 08:48

## 2021-01-12 RX ADMIN — RANOLAZINE 1000 MG: 500 TABLET, FILM COATED, EXTENDED RELEASE ORAL at 20:48

## 2021-01-12 RX ADMIN — INSULIN DETEMIR 60 UNITS: 100 INJECTION, SOLUTION SUBCUTANEOUS at 00:08

## 2021-01-12 RX ADMIN — GABAPENTIN 600 MG: 300 CAPSULE ORAL at 00:00

## 2021-01-12 RX ADMIN — METHOCARBAMOL 500 MG: 500 TABLET, FILM COATED ORAL at 00:01

## 2021-01-12 RX ADMIN — INSULIN ASPART 15 UNITS: 100 INJECTION, SOLUTION INTRAVENOUS; SUBCUTANEOUS at 17:04

## 2021-01-12 RX ADMIN — GABAPENTIN 600 MG: 300 CAPSULE ORAL at 08:49

## 2021-01-12 RX ADMIN — INSULIN ASPART 8 UNITS: 100 INJECTION, SOLUTION INTRAVENOUS; SUBCUTANEOUS at 11:20

## 2021-01-12 RX ADMIN — PRAMIPEXOLE DIHYDROCHLORIDE 2 MG: 1 TABLET ORAL at 20:48

## 2021-01-12 RX ADMIN — LISINOPRIL 40 MG: 40 TABLET ORAL at 00:00

## 2021-01-12 RX ADMIN — INSULIN DETEMIR 60 UNITS: 100 INJECTION, SOLUTION SUBCUTANEOUS at 20:51

## 2021-01-12 RX ADMIN — THIAMINE HCL TAB 100 MG 200 MG: 100 TAB at 20:47

## 2021-01-12 RX ADMIN — ATORVASTATIN CALCIUM 80 MG: 40 TABLET, FILM COATED ORAL at 20:48

## 2021-01-12 RX ADMIN — ALBUTEROL SULFATE 2 PUFF: 90 AEROSOL, METERED RESPIRATORY (INHALATION) at 19:19

## 2021-01-12 RX ADMIN — SODIUM CHLORIDE, POTASSIUM CHLORIDE, SODIUM LACTATE AND CALCIUM CHLORIDE 100 ML/HR: 600; 310; 30; 20 INJECTION, SOLUTION INTRAVENOUS at 11:19

## 2021-01-12 RX ADMIN — IPRATROPIUM BROMIDE 2 PUFF: 17 AEROSOL, METERED RESPIRATORY (INHALATION) at 12:46

## 2021-01-12 RX ADMIN — ALBUTEROL SULFATE 2 PUFF: 90 AEROSOL, METERED RESPIRATORY (INHALATION) at 16:05

## 2021-01-12 RX ADMIN — IPRATROPIUM BROMIDE 2 PUFF: 17 AEROSOL, METERED RESPIRATORY (INHALATION) at 16:05

## 2021-01-12 RX ADMIN — TRAZODONE HYDROCHLORIDE 300 MG: 150 TABLET ORAL at 00:01

## 2021-01-12 RX ADMIN — LISINOPRIL 40 MG: 40 TABLET ORAL at 20:48

## 2021-01-12 RX ADMIN — RANOLAZINE 1000 MG: 500 TABLET, FILM COATED, EXTENDED RELEASE ORAL at 08:48

## 2021-01-12 RX ADMIN — SODIUM CHLORIDE, PRESERVATIVE FREE 10 ML: 5 INJECTION INTRAVENOUS at 00:02

## 2021-01-12 RX ADMIN — PRASUGREL 10 MG: 10 TABLET, FILM COATED ORAL at 08:48

## 2021-01-12 RX ADMIN — INSULIN ASPART 15 UNITS: 100 INJECTION, SOLUTION INTRAVENOUS; SUBCUTANEOUS at 11:20

## 2021-01-12 RX ADMIN — ACETAMINOPHEN 650 MG: 325 TABLET, FILM COATED ORAL at 02:46

## 2021-01-12 RX ADMIN — INSULIN ASPART 15 UNITS: 100 INJECTION, SOLUTION INTRAVENOUS; SUBCUTANEOUS at 08:48

## 2021-01-12 RX ADMIN — INSULIN ASPART 10 UNITS: 100 INJECTION, SOLUTION INTRAVENOUS; SUBCUTANEOUS at 17:04

## 2021-01-12 RX ADMIN — RANOLAZINE 1000 MG: 500 TABLET, FILM COATED, EXTENDED RELEASE ORAL at 00:00

## 2021-01-12 RX ADMIN — ATORVASTATIN CALCIUM 80 MG: 40 TABLET, FILM COATED ORAL at 00:01

## 2021-01-12 RX ADMIN — GABAPENTIN 600 MG: 300 CAPSULE ORAL at 20:47

## 2021-01-12 RX ADMIN — PANTOPRAZOLE SODIUM 40 MG: 40 TABLET, DELAYED RELEASE ORAL at 22:07

## 2021-01-12 RX ADMIN — METHOCARBAMOL 500 MG: 500 TABLET, FILM COATED ORAL at 17:04

## 2021-01-12 RX ADMIN — DILTIAZEM HYDROCHLORIDE 120 MG: 120 CAPSULE, COATED, EXTENDED RELEASE ORAL at 08:49

## 2021-01-12 RX ADMIN — TRAZODONE HYDROCHLORIDE 300 MG: 150 TABLET ORAL at 20:47

## 2021-01-12 RX ADMIN — SODIUM CHLORIDE, PRESERVATIVE FREE 10 ML: 5 INJECTION INTRAVENOUS at 22:07

## 2021-01-12 RX ADMIN — IPRATROPIUM BROMIDE 2 PUFF: 17 AEROSOL, METERED RESPIRATORY (INHALATION) at 08:27

## 2021-01-12 RX ADMIN — PRAMIPEXOLE DIHYDROCHLORIDE 2 MG: 1 TABLET ORAL at 00:00

## 2021-01-12 RX ADMIN — RIVAROXABAN 20 MG: 10 TABLET, FILM COATED ORAL at 17:04

## 2021-01-12 RX ADMIN — INSULIN ASPART 10 UNITS: 100 INJECTION, SOLUTION INTRAVENOUS; SUBCUTANEOUS at 08:47

## 2021-01-12 RX ADMIN — METOPROLOL SUCCINATE 50 MG: 50 TABLET, EXTENDED RELEASE ORAL at 08:48

## 2021-01-12 RX ADMIN — PANTOPRAZOLE SODIUM 40 MG: 40 TABLET, DELAYED RELEASE ORAL at 00:00

## 2021-01-12 RX ADMIN — SODIUM CHLORIDE, POTASSIUM CHLORIDE, SODIUM LACTATE AND CALCIUM CHLORIDE 100 ML/HR: 600; 310; 30; 20 INJECTION, SOLUTION INTRAVENOUS at 00:00

## 2021-01-12 RX ADMIN — ISOSORBIDE MONONITRATE 120 MG: 60 TABLET, EXTENDED RELEASE ORAL at 06:16

## 2021-01-12 RX ADMIN — HYDROCODONE BITARTRATE AND ACETAMINOPHEN 1 TABLET: 5; 325 TABLET ORAL at 15:40

## 2021-01-12 NOTE — ED NOTES
atrovent inhaler arrived from pharmacy, rt called to administer     Lala Garcia RN  01/11/21 2129

## 2021-01-12 NOTE — OUTREACH NOTE
COVID-19 Week 2 Survey      Responses   [facility] patient discharged from?  [place]   Does the patient have one of the following disease processes/diagnoses(primary or secondary)?  COVID-19   COVID-19 underlying condition?  None   Call Number  Call 1   COVID-19 Week 2: Call 1 attempt successful?  No   Revoke  Readmitted   Discharge diagnosis  Chest pain,    COVID-19 virus infection          Tiffanie Alvarez RN

## 2021-01-12 NOTE — PROGRESS NOTES
Discharge Planning Assessment  [facility]     Patient Name: Yordy Hansen  MRN: [id number]  Today's Date: 1/12/2021    Admit Date: 1/11/2021    Discharge Needs Assessment     Row Name 01/12/21 1214       Living Environment    Lives With  alone    Current Living Arrangements  home/apartment/condo    Primary Care Provided by  self    Provides Primary Care For  no one    Family Caregiver if Needed  parent(s)    Quality of Family Relationships  helpful    Able to Return to Prior Arrangements  yes       Resource/Environmental Concerns    Resource/Environmental Concerns  none    Transportation Concerns  car, none       Transition Planning    Patient/Family Anticipates Transition to  home    Patient/Family Anticipated Services at Transition  none    Transportation Anticipated  family or friend will provide       Discharge Needs Assessment    Equipment Currently Used at Home  cane, straight;walker, rolling;cpap;glucometer    Concerns to be Addressed  denies needs/concerns at this time    Anticipated Changes Related to Illness  none    Discharge Coordination/Progress  pt plans dc to home,  ok for transition visit.  states that he has family and friends that can help him if needed        Discharge Plan     Row Name 01/12/21 0931       Plan    Plan Comments  cont at 95% on ra,    decadron po.   na 130  on xarelto pts.  was just discharged on 1/6,  cont with c/o shortness of air and increased work of breathing.  however, pt had finished course of meds, plan to monitor to  make sure that pt does not decompensate.  poss dc 1-2days  edward murphy rn        Continued Care and Services - Admitted Since 1/11/2021    Coordination has not been started for this encounter.       Expected Discharge Date and Time     Expected Discharge Date Expected Discharge Time    Jan 13, 2021         Demographic Summary     Row Name 01/12/21 1213       General Information    Admission Type  observation    Arrived From  home    Required Notices  Provided  Observation Status Notice    General Information Comments  no changes.  last assessment completed earlier this month        Functional Status    No documentation.       Psychosocial    No documentation.       Abuse/Neglect    No documentation.       Legal    No documentation.       Substance Abuse    No documentation.       Patient Forms    No documentation.           Krystal Ferris RN

## 2021-01-12 NOTE — ED NOTES
Pt walked for 2 rounds of 1 minute. Pt had to sit down and rest in between. O2 sats remained at 96-97 for duration of walk.     Yves Geiger  01/11/21 4366

## 2021-01-12 NOTE — PROGRESS NOTES
[facility] Medicine Services  INPATIENT PROGRESS NOTE    Length of Stay: 0  Date of Admission: 1/11/2021  Primary Care Physician: Mami Perez APRN    Subjective   HPI: 40-year-old male with past medical history significant for coronary artery disease status post stenting, COPD, struct of sleep apnea on CPAP therapy, hypertension, insulin-dependent type 2 diabetes mellitus, morbid obesity who presents to the emergency department with increasing shortness of breath.       Patient was just discharged from [facility] on 1/6/2021 after hospitalization for chest pain, shortness of breath and COVID-19 infection.  He required supplemental oxygen therapy received remdesivir therapy and was discharged home with dexamethasone therapy after chest pain rule out.  He was discharged without home oxygen therapy.     Since his return home he notes persistent intermittent chest pain along with increasing shortness of breath.  He denies fevers, chills or sweats.  He notes persistent cough and intermittent wheezing.  The quality of his chest pain has not changed.  He has been using his home inhalers with increasing frequency and has continued dexamethasone as prescribed.    (S): patient was found on the bed, playing games on his mobile phone; when asked about his chest pain, he put a face that he is hurting  No nausea, no vomit, no headaches;    Review of Systems     All pertinent negatives and positives are as above. All other systems have been reviewed and are negative unless otherwise stated.     Prior to Admission medications    Medication Sig Start Date End Date Taking? Authorizing Provider   albuterol (PROVENTIL HFA;VENTOLIN HFA) 108 (90 BASE) MCG/ACT inhaler Inhale 2 puffs Every 4 (Four) Hours As Needed for Wheezing.   Yes Provider, MD Latrice   atorvastatin (LIPITOR) 80 MG tablet Take 1 tablet by mouth Every Night. 9/29/20  Yes Mami Perez APRN    dexamethasone (DECADRON) 6 MG tablet Take 1 tablet by mouth Daily With Breakfast. 1/6/21  Yes Yordy Goncalves MD   dilTIAZem CD (Cardizem CD) 120 MG 24 hr capsule Take 1 capsule by mouth Daily. 10/8/20  Yes Judy Crews MD   fenofibrate micronized (LOFIBRA) 134 MG capsule Take 1 capsule by mouth Every Morning Before Breakfast. 9/29/20  Yes Mami Perez APRN   gabapentin (NEURONTIN) 300 MG capsule Take 2 capsules by mouth 2 (Two) Times a Day. 1/8/21  Yes Mami Perez APRN   glucose blood test strip Use as instructed 1/8/21  Yes Mami Perez APRN   glucose monitor monitoring kit 1 each As Needed (check blood glucose 3x daily). 9/29/20  Yes Mami Perez APRN   insulin glargine (LANTUS) 100 UNIT/ML injection Inject 60 Units under the skin into the appropriate area as directed Every Night. 9/29/20  Yes Mami Perez APRN   insulin lispro (HumaLOG) 100 UNIT/ML injection Inject 15 Units under the skin into the appropriate area as directed 3 (Three) Times a Day Before Meals. 9/29/20  Yes Mami Perez APRN   ipratropium (ATROVENT HFA) 17 MCG/ACT inhaler Inhale 2 puffs. 4/8/20 4/9/21 Yes Latrice Bolaños MD   isosorbide mononitrate (IMDUR) 120 MG 24 hr tablet Take 1 tablet by mouth Every Morning for 30 days. 10/10/17 9/29/21 Yes Minesh Gregg MD   lisinopril (PRINIVIL,ZESTRIL) 40 MG tablet Take 1 tablet by mouth Every Night. 9/29/20  Yes Mami Perez APRN   methocarbamol (Robaxin) 500 MG tablet Take 1 tablet by mouth 4 (Four) Times a Day. 9/29/20  Yes Mami Perez APRN   metoprolol succinate XL (TOPROL-XL) 50 MG 24 hr tablet Take 1 tablet by mouth Daily. 9/29/20  Yes Mami Perez APRN   Microlet Lancets misc One touch delica lancets 1/8/21  Yes Mami Perez APRN   nitroglycerin (NITROSTAT) 0.4 MG SL tablet Place 1 tablet under the tongue Every 5 (Five) Minutes As Needed for Chest Pain for up to 15 days.  "10/10/17 9/29/21 Yes Minesh Gregg MD   pantoprazole (PROTONIX) 40 MG EC tablet Take 1 tablet by mouth Every Night. 9/29/20  Yes Mami Perez APRN   pramipexole (MIRAPEX) 1 MG tablet Take 2 tablets by mouth Every Night. 9/29/20  Yes Mami Perez APRN   prasugrel (EFFIENT) 10 MG tablet Take 1 tablet by mouth Daily. 9/29/20  Yes Mami Perez APRN   ranolazine (RANEXA) 1000 MG 12 hr tablet Take 1 tablet by mouth Every 12 (Twelve) Hours. 11/10/17  Yes Earnest Perez MD   ReliOn Insulin Syringe 31G X 15/64\" 0.5 ML misc For use with insulin 9/29/20  Yes Mami Perez APRN   rivaroxaban (XARELTO) 20 MG tablet Take 1 tablet by mouth Daily With Dinner. 9/29/20  Yes Mami Perez APRN   SITagliptin (Januvia) 100 MG tablet Take 1 tablet by mouth Daily. 1/8/21  Yes Mami Perez APRN   traZODone (DESYREL) 300 MG tablet Take 1 tablet by mouth every night at bedtime. 9/29/20  Yes Mami Perez APRN   albuterol (ACCUNEB) 1.25 MG/3ML nebulizer solution Take 3 mL by nebulization Every 6 (Six) Hours As Needed for Wheezing. 11/15/17   Tahmina Reid APRN       albuterol sulfate HFA, 2 puff, Inhalation, 4x Daily - RT  atorvastatin, 80 mg, Oral, Nightly  dilTIAZem CD, 120 mg, Oral, Daily  gabapentin, 600 mg, Oral, BID  insulin aspart, 0-14 Units, Subcutaneous, TID AC  insulin aspart, 15 Units, Subcutaneous, TID With Meals  insulin detemir, 60 Units, Subcutaneous, Nightly  ipratropium, 2 puff, Inhalation, 4x Daily - RT  isosorbide mononitrate, 120 mg, Oral, QAM  lisinopril, 40 mg, Oral, Nightly  methocarbamol, 500 mg, Oral, 4x Daily  metoprolol succinate XL, 50 mg, Oral, Daily  pantoprazole, 40 mg, Oral, Nightly  pramipexole, 2 mg, Oral, Nightly  prasugrel, 10 mg, Oral, Daily  ranolazine, 1,000 mg, Oral, Q12H  rivaroxaban, 20 mg, Oral, Daily With Dinner  sodium chloride, 10 mL, Intravenous, Q12H      lactated ringers, 100 mL/hr, Last Rate: 100 mL/hr (01/12/21 " 1319)        Objective    Temp:  [96.2 °F (35.7 °C)-98.6 °F (37 °C)] 96.2 °F (35.7 °C)  Heart Rate:  [72-92] 72  Resp:  [18-26] 20  BP: (123-166)/(57-87) 134/65    Physical Exam  Constitutional:       Appearance: Normal appearance. He is obese.   HENT:      Head: Normocephalic.      Nose: Nose normal.      Mouth/Throat:      Mouth: Mucous membranes are moist.   Eyes:      Extraocular Movements: Extraocular movements intact.   Neck:      Musculoskeletal: Normal range of motion and neck supple.   Cardiovascular:      Rate and Rhythm: Normal rate and regular rhythm.      Pulses: Normal pulses.      Heart sounds: Normal heart sounds.   Pulmonary:      Effort: Pulmonary effort is normal.      Breath sounds: Normal breath sounds.   Abdominal:      General: Bowel sounds are normal.   Musculoskeletal: Normal range of motion.   Skin:     General: Skin is warm and dry.   Neurological:      General: No focal deficit present.      Mental Status: Mental status is at baseline.   Psychiatric:         Mood and Affect: Mood normal.             Results Review:  I have reviewed the labs, radiology results, and diagnostic studies.    Laboratory Data:   Results from last 7 days   Lab Units 01/12/21  0626 01/11/21 1659 01/06/21  0959   SODIUM mmol/L 130* 129* 130*   POTASSIUM mmol/L 4.8 5.6* 4.1   CHLORIDE mmol/L 94* 90* 92*   CO2 mmol/L 25.0 28.0 30.0*   BUN mg/dL 24* 18 12   CREATININE mg/dL 0.89 0.75* 0.72*   GLUCOSE mg/dL 363* 496* 303*   CALCIUM mg/dL 9.4 9.2 9.9   BILIRUBIN mg/dL  --  0.2 0.4   ALK PHOS U/L  --  84 87   ALT (SGPT) U/L  --  26 44*   AST (SGOT) U/L  --  22 30   ANION GAP mmol/L 11.0 11.0 8.0     Estimated Creatinine Clearance: 194.2 mL/min (by C-G formula based on SCr of 0.89 mg/dL).          Results from last 7 days   Lab Units 01/12/21  0626 01/11/21 1659 01/06/21  0959   WBC 10*3/mm3 14.62* 13.53* 10.35   HEMOGLOBIN g/dL 13.9 15.5 13.9   HEMATOCRIT % 42.0 44.4 40.6   PLATELETS 10*3/mm3 209 235 215            Culture Data:   No results found for: BLOODCX  No results found for: URINECX  No results found for: RESPCX  No results found for: WOUNDCX  No results found for: STOOLCX  No components found for: BODYFLD    Radiology Data:   Imaging Results (Last 24 Hours)     Procedure Component Value Units Date/Time    XR Chest 1 View [[id number]] Collected: 01/11/21 1734     Updated: 01/11/21 1815    Narrative:      PROCEDURE: XR CHEST 1 VW    VIEWS:Single    INDICATION: Sepsis    COMPARISON: CXR: 1/4/2021    FINDINGS:       - lines/tubes: None    - cardiac: Borderline size.    - mediastinum: Contour within normal limits.     - lungs: No evidence of a focal air space process, pulmonary  interstitial edema, nodule(s)/mass. Mild streaky bibasilar  opacities probably represent atelectasis    - pleura: No evidence of  fluid.      - osseous: Unremarkable for age.      Impression:      Very mild streaky bibasilar opacity, favored to represent  atelectasis.      Electronically signed by:  Gela Shaffer MD  1/11/2021 6:14 PM CST  Workstation: [id number]          I have reviewed the patient's current medications.     Assessment/Plan       Chest pain     Atypical; it is with coughing and with deep inspiration; EKG and serial troponin negative for an acute event; discussed with cardiologist, Dr Jenkins, who recommended follow up with his cardiologist, Dr Tejada as outpatient    Paroxysmal atrial fibrillation     On NSR now; continue with xarelto, metoprolol and cardizem    Hyperglycemia on DM type 2     With A1C 10.6 on 8/20; continue with current treatment; check A1C am    Lactic acidosis      Unknown reason (metformin use, diabetes, IVFs, sepsis?); I don't see signs of infection just yet; blood cultures negative so far; will stop IVFs now     Check procalcitonin and lactic acid again; if trending down, antibiotic will be held; monitor    Chronic medical problems     ADOLFO on CPAP     Morbid obese     Essential hypertension     Chronic  pulmonary embolism     Recent COVID       Discharge Planning: I expect patient to be discharged to home in 1 to 2 days    Satish Caraballo MD

## 2021-01-12 NOTE — PLAN OF CARE
Goal Outcome Evaluation:  Plan of Care Reviewed With: patient  Progress: no change  Outcome Summary: new admit from ED. vss. pain controlled with prn tylenol. pt's oxygen remains on room air. pt resting in between care. will continue to monitor.

## 2021-01-12 NOTE — H&P
[facility] Medicine Admission      Date of Admission: 1/11/2021, 22:24 CST      Primary Care Physician: Mami Perez APRN      Chief Complaint: Shortness of breath    HPI: 40-year-old male with past medical history significant for coronary artery disease status post stenting, COPD, struct of sleep apnea on CPAP therapy, hypertension, insulin-dependent type 2 diabetes mellitus, morbid obesity who presents to the emergency department with increasing shortness of breath.      Patient was just discharged from [facility] on 1/6/2021 after hospitalization for chest pain, shortness of breath and COVID-19 infection.  He required supplemental oxygen therapy received remdesivir therapy and was discharged home with dexamethasone therapy after chest pain rule out.  He was discharged without home oxygen therapy.    Since his return home he notes persistent intermittent chest pain along with increasing shortness of breath.  He denies fevers, chills or sweats.  He notes persistent cough and intermittent wheezing.  The quality of his chest pain has not changed.  He has been using his home inhalers with increasing frequency and has continued dexamethasone as prescribed.    Concurrent Medical History:  has a past medical history of CAD (coronary artery disease), Chronic back pain, Chronic pulmonary embolism (CMS/HCC), Coronary artery disease, Diabetes mellitus (CMS/HCC), Factor II deficiency (CMS/HCC), Fatty liver, GERD (gastroesophageal reflux disease), Hyperlipidemia, Hypertension, Morbid obesity (CMS/HCC), RLS (restless legs syndrome), Seizures (CMS/HCC), and Sleep apnea.    Past Surgical History:  has a past surgical history that includes transesophageal echocardiogram (travis); pr rt/lt heart catheters (N/A, 3/15/2017); Cardiac catheterization (N/A, 3/15/2017); Cardiac catheterization (N/A, 3/15/2017); Coronary angioplasty with stent; Cardiac catheterization (N/A,  3/1/2018); Cholecystectomy; and Cardiac catheterization (N/A, 9/2/2020).    Family History: family history includes Cancer in his paternal grandfather; Heart disease in his mother; Obesity in his mother; Sleep apnea in his father.     Social History:  reports that he quit smoking about 24 years ago. His smoking use included cigarettes. He has a 0.13 pack-year smoking history. His smokeless tobacco use includes snuff. He reports that he does not drink alcohol or use drugs.    Allergies:   Allergies   Allergen Reactions   • Glipizide Other (See Comments) and Unknown (See Comments)     Slurred Speech  Slurred Speech  Hallucinations, Slurred Speech   • Reglan [Metoclopramide] Anxiety   • Tramadol Other (See Comments)     seizures   • Risperidone Other (See Comments)     Slurred speech  Can't stand, trouble breathing, slurred speech         Medications:   Prior to Admission medications    Medication Sig Start Date End Date Taking? Authorizing Provider   albuterol (ACCUNEB) 1.25 MG/3ML nebulizer solution Take 3 mL by nebulization Every 6 (Six) Hours As Needed for Wheezing. 11/15/17   Tahmina Reid APRN   albuterol (PROVENTIL HFA;VENTOLIN HFA) 108 (90 BASE) MCG/ACT inhaler Inhale 2 puffs Every 4 (Four) Hours As Needed for Wheezing.    Provider, MD Latrice   atorvastatin (LIPITOR) 80 MG tablet Take 1 tablet by mouth Every Night. 9/29/20   Mami Perez APRN   dexamethasone (DECADRON) 6 MG tablet Take 1 tablet by mouth Daily With Breakfast. 1/6/21   Yordy Goncalves MD   dilTIAZem CD (Cardizem CD) 120 MG 24 hr capsule Take 1 capsule by mouth Daily. 10/8/20   Judy Crews MD   fenofibrate micronized (LOFIBRA) 134 MG capsule Take 1 capsule by mouth Every Morning Before Breakfast. 9/29/20   Mami Perez APRN   gabapentin (NEURONTIN) 300 MG capsule Take 2 capsules by mouth 2 (Two) Times a Day. 1/8/21   Mami Perez APRN   glucose blood test strip Use as instructed 1/8/21   Chris  "LALA Rosenbaum   glucose monitor monitoring kit 1 each As Needed (check blood glucose 3x daily). 9/29/20   Mami Perez APRN   insulin glargine (LANTUS) 100 UNIT/ML injection Inject 60 Units under the skin into the appropriate area as directed Every Night. 9/29/20   Mami Perez APRN   insulin lispro (HumaLOG) 100 UNIT/ML injection Inject 15 Units under the skin into the appropriate area as directed 3 (Three) Times a Day Before Meals. 9/29/20   Mami Perez APRN   ipratropium (ATROVENT HFA) 17 MCG/ACT inhaler Inhale 2 puffs. 4/8/20 4/9/21  Provider, MD Latrice   isosorbide mononitrate (IMDUR) 120 MG 24 hr tablet Take 1 tablet by mouth Every Morning for 30 days. 10/10/17 9/29/21  Minesh Gregg MD   lisinopril (PRINIVIL,ZESTRIL) 40 MG tablet Take 1 tablet by mouth Every Night. 9/29/20   Mami Perez APRN   methocarbamol (Robaxin) 500 MG tablet Take 1 tablet by mouth 4 (Four) Times a Day. 9/29/20   Mami Perez APRN   metoprolol succinate XL (TOPROL-XL) 50 MG 24 hr tablet Take 1 tablet by mouth Daily. 9/29/20   Mami Perez APRN   Microlet Lancets misc One touch delica lancets 1/8/21   Mami Perez APRN   nitroglycerin (NITROSTAT) 0.4 MG SL tablet Place 1 tablet under the tongue Every 5 (Five) Minutes As Needed for Chest Pain for up to 15 days. 10/10/17 9/29/21  Minesh Gregg MD   pantoprazole (PROTONIX) 40 MG EC tablet Take 1 tablet by mouth Every Night. 9/29/20   Mami Perez APRN   pramipexole (MIRAPEX) 1 MG tablet Take 2 tablets by mouth Every Night. 9/29/20   Mami Perez APRN   prasugrel (EFFIENT) 10 MG tablet Take 1 tablet by mouth Daily. 9/29/20   Mami Perez APRN   ranolazine (RANEXA) 1000 MG 12 hr tablet Take 1 tablet by mouth Every 12 (Twelve) Hours. 11/10/17   Earnest Perez MD   ReliOn Insulin Syringe 31G X 15/64\" 0.5 ML misc For use with insulin 9/29/20   Mami Perez, " LALA   rivaroxaban (XARELTO) 20 MG tablet Take 1 tablet by mouth Daily With Dinner. 9/29/20   Mami Perez APRN   SITagliptin (Januvia) 100 MG tablet Take 1 tablet by mouth Daily. 1/8/21   Mami Perez APRN   traZODone (DESYREL) 300 MG tablet Take 1 tablet by mouth every night at bedtime. 9/29/20   Mami Perez APRN       Review of Systems:  A complete 10 point review of systems was obtained and was negative unless noted in the HPI.    Physical Exam:   Temp:  [97.7 °F (36.5 °C)] 97.7 °F (36.5 °C)  Heart Rate:  [80-90] 86  Resp:  [24-26] 25  BP: (145-166)/(66-87) 166/74  Physical Exam  Constitutional:       General: He is in acute distress.      Appearance: He is obese. He is not diaphoretic.   HENT:      Head: Normocephalic.      Mouth/Throat:      Mouth: Mucous membranes are dry.      Pharynx: Oropharynx is clear. No oropharyngeal exudate.   Eyes:      Extraocular Movements: Extraocular movements intact.      Conjunctiva/sclera: Conjunctivae normal.      Pupils: Pupils are equal, round, and reactive to light.   Neck:      Musculoskeletal: Normal range of motion and neck supple.   Cardiovascular:      Rate and Rhythm: Normal rate and regular rhythm.      Heart sounds: Normal heart sounds.   Pulmonary:      Comments: Increased work of breathing with accessory muscle use and expiratory wheeze with reduced breath sounds throughout.  Abdominal:      General: Bowel sounds are normal.      Palpations: Abdomen is soft.      Tenderness: There is no abdominal tenderness.   Musculoskeletal: Normal range of motion.   Skin:     General: Skin is warm and dry.      Capillary Refill: Capillary refill takes less than 2 seconds.   Neurological:      General: No focal deficit present.      Mental Status: He is alert. Mental status is at baseline.   Psychiatric:         Behavior: Behavior normal.           Results Reviewed:  I have personally reviewed current lab, radiology, and data and agree with  results.  Lab Results (last 24 hours)     Procedure Component Value Units Date/Time    Lactic Acid, Reflex [[id number]]  (Abnormal) Collected: 01/11/21 2128    Specimen: Blood Updated: 01/11/21 2156     Lactate 3.3 mmol/L     Lactic Acid, Reflex Timer (This will reflex a repeat order 3-3:15 hours after ordered.) [[id number]] Collected: 01/11/21 1659    Specimen: Blood Updated: 01/11/21 2100     Hold Tube Hold for add-ons.     Comment: Auto resulted.       Comprehensive Metabolic Panel [[id number]]  (Abnormal) Collected: 01/11/21 1659    Specimen: Blood Updated: 01/11/21 1818     Glucose 496 mg/dL      BUN 18 mg/dL      Creatinine 0.75 mg/dL      Sodium 129 mmol/L      Potassium 5.6 mmol/L      Comment: Slight hemolysis detected by analyzer. Results may be affected.        Chloride 90 mmol/L      CO2 28.0 mmol/L      Calcium 9.2 mg/dL      Total Protein 6.9 g/dL      Albumin 3.60 g/dL      ALT (SGPT) 26 U/L      AST (SGOT) 22 U/L      Comment: Slight hemolysis detected by analyzer. Results may be affected.        Alkaline Phosphatase 84 U/L      Total Bilirubin 0.2 mg/dL      eGFR Non African Amer 114 mL/min/1.73      Globulin 3.3 gm/dL      A/G Ratio 1.1 g/dL      BUN/Creatinine Ratio 24.0     Anion Gap 11.0 mmol/L     Narrative:      GFR Normal >60  Chronic Kidney Disease <60  Kidney Failure <15      Agenda Draw [[id number]] Collected: 01/11/21 1659    Specimen: Blood Updated: 01/11/21 1801    Narrative:      The following orders were created for panel order Agenda Draw.  Procedure                               Abnormality         Status                     ---------                               -----------         ------                     Light Blue Top[[id number]]                                   Final result               Green Top (Gel)[[id number]]                                  Final result               Lavender Top[[id number]]                                     Final result               Gold Top -  SST[[id number]]                                   Final result                 Please view results for these tests on the individual orders.    Lavender Top [[id number]] Collected: 01/11/21 1659    Specimen: Blood Updated: 01/11/21 1801     Extra Tube hold for add-on     Comment: Auto resulted       Gold Top - SST [[id number]] Collected: 01/11/21 1659    Specimen: Blood Updated: 01/11/21 1801     Extra Tube Hold for add-ons.     Comment: Auto resulted.       Light Blue Top [[id number]] Collected: 01/11/21 1659    Specimen: Blood Updated: 01/11/21 1801     Extra Tube hold for add-on     Comment: Auto resulted       Green Top (Gel) [[id number]] Collected: 01/11/21 1659    Specimen: Blood Updated: 01/11/21 1801     Extra Tube Hold for add-ons.     Comment: Auto resulted.       Lactic Acid, Plasma [[id number]]  (Abnormal) Collected: 01/11/21 1659    Specimen: Blood Updated: 01/11/21 1759     Lactate 3.2 mmol/L     Troponin [[id number]]  (Normal) Collected: 01/11/21 1659    Specimen: Blood Updated: 01/11/21 1759     Troponin T <0.010 ng/mL      Comment: Specimen hemolyzed.  Results may be affected.       Narrative:      Troponin T Reference Range:  <= 0.03 ng/mL-   Negative for AMI  >0.03 ng/mL-     Abnormal for myocardial necrosis.  Clinicians would have to utilize clinical acumen, EKG, Troponin and serial changes to determine if it is an Acute Myocardial Infarction or myocardial injury due to an underlying chronic condition.       Results may be falsely decreased if patient taking Biotin.      BNP [[id number]]  (Normal) Collected: 01/11/21 1659    Specimen: Blood Updated: 01/11/21 1747     proBNP 33.5 pg/mL     Narrative:      Among patients with dyspnea, NT-proBNP is highly sensitive for the detection of acute congestive heart failure. In addition NT-proBNP of <300 pg/ml effectively rules out acute congestive heart failure with 99% negative predictive value.    Results may be falsely decreased if patient taking Biotin.       D-dimer, Quantitative [[id number]]  (Normal) Collected: 01/11/21 1659    Specimen: Blood Updated: 01/11/21 1737     D-Dimer, Quantitative 444 ng/mL (FEU)     Narrative:      Dimer values <500 ng/ml FEU are FDA approved as aid in diagnosis of deep venous thrombosis and pulmonary embolism.  This test should not be used in an exclusion strategy with pretest probability alone.    A recent guideline regarding diagnosis for pulmonary thromboembolism recommends an adjusted exclusion criterion of age x 10 ng/ml FEU for patients >50 years of age ([employer]ori Intern Med 2015; 163: 701-711).      CBC & Differential [[id number]]  (Abnormal) Collected: 01/11/21 1659    Specimen: Blood Updated: 01/11/21 1711    Narrative:      The following orders were created for panel order CBC & Differential.  Procedure                               Abnormality         Status                     ---------                               -----------         ------                     CBC Auto Differential[[id number]]        Abnormal            Final result                 Please view results for these tests on the individual orders.    CBC Auto Differential [[id number]]  (Abnormal) Collected: 01/11/21 1659    Specimen: Blood Updated: 01/11/21 1711     WBC 13.53 10*3/mm3      RBC 5.42 10*6/mm3      Hemoglobin 15.5 g/dL      Hematocrit 44.4 %      MCV 81.9 fL      MCH 28.6 pg      MCHC 34.9 g/dL      RDW 13.7 %      RDW-SD 40.1 fl      MPV 8.9 fL      Platelets 235 10*3/mm3      Neutrophil % 84.7 %      Lymphocyte % 7.9 %      Monocyte % 2.8 %      Eosinophil % 0.4 %      Basophil % 0.7 %      Immature Grans % 3.5 %      Neutrophils, Absolute 11.45 10*3/mm3      Lymphocytes, Absolute 1.07 10*3/mm3      Monocytes, Absolute 0.38 10*3/mm3      Eosinophils, Absolute 0.06 10*3/mm3      Basophils, Absolute 0.09 10*3/mm3      Immature Grans, Absolute 0.48 10*3/mm3      nRBC 0.0 /100 WBC     Blood Culture - Blood, Arm, Right [[id number]] Collected: 01/11/21 1659     Specimen: Blood from Arm, Right Updated: 01/11/21 1708    Blood Culture - Blood, Arm, Left [[id number]] Collected: 01/11/21 1659    Specimen: Blood from Arm, Left Updated: 01/11/21 1708        Imaging Results (Last 24 Hours)     Procedure Component Value Units Date/Time    XR Chest 1 View [[id number]] Collected: 01/11/21 1734     Updated: 01/11/21 1815    Narrative:      PROCEDURE: XR CHEST 1 VW    VIEWS:Single    INDICATION: Sepsis    COMPARISON: CXR: 1/4/2021    FINDINGS:       - lines/tubes: None    - cardiac: Borderline size.    - mediastinum: Contour within normal limits.     - lungs: No evidence of a focal air space process, pulmonary  interstitial edema, nodule(s)/mass. Mild streaky bibasilar  opacities probably represent atelectasis    - pleura: No evidence of  fluid.      - osseous: Unremarkable for age.      Impression:      Very mild streaky bibasilar opacity, favored to represent  atelectasis.      Electronically signed by:  Gela Shaffer MD  1/11/2021 6:14 PM CST  Workstation: [id number]            Assessment:    Active Hospital Problems    Diagnosis   • **COVID-19 virus infection   • Chest pain   • Paroxysmal atrial fibrillation (CMS/HCC)   • Morbid obesity (CMS/HCC)   • Type 2 diabetes mellitus without complication, with long-term current use of insulin (CMS/HCC)   • Essential hypertension   • ADOLFO on CPAP             Plan:    #1.  COVID-19 infection with respiratory distress.  At this time the patient is not requiring supplemental oxygen therapy though he does demonstrate tachypnea with increased work of breathing along with a lactic acidosis that is likely being driven by tachypnea and respiratory muscle use.  Chest x-ray is negative aside from linear bilateral atelectasis.  Given his duration of symptoms he is no longer likely to benefit from remdesivir therapy.   Inflammatory markers are not significantly elevated.  Though he is not requiring supplemental oxygen the patient does appear to be at high  risk for further decompensation and with his comorbidities of sleep apnea, morbid obesity and COPD we will continue with oral dexamethasone therapy 6 mg daily.  Albuterol and ipratropium inhalers ordered and incentive spirometry encouraged.  Continue with nightly CPAP therapy.  We will monitor closely over the next 24 hours as the patient is at high risk for further decompensation.    #2.  Lactic acid elevation.  This is likely secondary to persistent increased work of breathing with accessory respiratory muscle use with ruth mitten increase in CK enzyme.  Aside from COVID-19 infection there is no other obvious source of infection.  IV fluids overnight and recheck lactate in the morning    #3.  Chest pain.  Coronary artery disease status post stent.  Troponin enzyme negative with no ST or T wave changes on EKG.  Monitor on telemetry and repeat troponin the morning.  Coronary angiogram performed in September 2020 which revealed mild in-stent restenosis.  No intervention was performed at that time and medical management was continued.    #4.  Insulin-dependent type 2 diabetes mellitus with hyperglycemia.  Hyperglycemia likely secondary to steroid use.  Resume home insulin regimen with the addition of sliding scale and blood glucose checks with meals and nightly.      CODE STATUS: Full code  DVT prophylaxis: Oral anticoagulation  Diet: Cardiac and diabetic  Disposition: Observation admission.      Jose F Cook MD  Hospitalist Service

## 2021-01-13 ENCOUNTER — READMISSION MANAGEMENT (OUTPATIENT)
Dept: CALL CENTER | Facility: HOSPITAL | Age: 41
End: 2021-01-13

## 2021-01-13 VITALS
TEMPERATURE: 96.6 F | WEIGHT: 315 LBS | DIASTOLIC BLOOD PRESSURE: 67 MMHG | RESPIRATION RATE: 18 BRPM | BODY MASS INDEX: 44.1 KG/M2 | HEIGHT: 71 IN | HEART RATE: 66 BPM | OXYGEN SATURATION: 98 % | SYSTOLIC BLOOD PRESSURE: 148 MMHG

## 2021-01-13 LAB
ANION GAP SERPL CALCULATED.3IONS-SCNC: 11 MMOL/L (ref 5–15)
BASOPHILS # BLD AUTO: 0.04 10*3/MM3 (ref 0–0.2)
BASOPHILS NFR BLD AUTO: 0.3 % (ref 0–1.5)
BUN SERPL-MCNC: 23 MG/DL (ref 6–20)
BUN/CREAT SERPL: 29.9 (ref 7–25)
CALCIUM SPEC-SCNC: 9 MG/DL (ref 8.6–10.5)
CHLORIDE SERPL-SCNC: 95 MMOL/L (ref 98–107)
CO2 SERPL-SCNC: 25 MMOL/L (ref 22–29)
CREAT SERPL-MCNC: 0.77 MG/DL (ref 0.76–1.27)
D-LACTATE SERPL-SCNC: 2.5 MMOL/L (ref 0.5–2)
DEPRECATED RDW RBC AUTO: 42.6 FL (ref 37–54)
EOSINOPHIL # BLD AUTO: 0.15 10*3/MM3 (ref 0–0.4)
EOSINOPHIL NFR BLD AUTO: 1.2 % (ref 0.3–6.2)
ERYTHROCYTE [DISTWIDTH] IN BLOOD BY AUTOMATED COUNT: 13.9 % (ref 12.3–15.4)
GFR SERPL CREATININE-BSD FRML MDRD: 111 ML/MIN/1.73
GLUCOSE BLDC GLUCOMTR-MCNC: 260 MG/DL (ref 70–130)
GLUCOSE BLDC GLUCOMTR-MCNC: 375 MG/DL (ref 70–130)
GLUCOSE SERPL-MCNC: 297 MG/DL (ref 65–99)
HBA1C MFR BLD: 12.4 % (ref 4.8–5.6)
HCT VFR BLD AUTO: 41.5 % (ref 37.5–51)
HGB BLD-MCNC: 13.7 G/DL (ref 13–17.7)
IMM GRANULOCYTES # BLD AUTO: 0.44 10*3/MM3 (ref 0–0.05)
IMM GRANULOCYTES NFR BLD AUTO: 3.6 % (ref 0–0.5)
LYMPHOCYTES # BLD AUTO: 2.13 10*3/MM3 (ref 0.7–3.1)
LYMPHOCYTES NFR BLD AUTO: 17.5 % (ref 19.6–45.3)
MCH RBC QN AUTO: 28.1 PG (ref 26.6–33)
MCHC RBC AUTO-ENTMCNC: 33 G/DL (ref 31.5–35.7)
MCV RBC AUTO: 85 FL (ref 79–97)
MONOCYTES # BLD AUTO: 0.99 10*3/MM3 (ref 0.1–0.9)
MONOCYTES NFR BLD AUTO: 8.1 % (ref 5–12)
NEUTROPHILS NFR BLD AUTO: 69.3 % (ref 42.7–76)
NEUTROPHILS NFR BLD AUTO: 8.42 10*3/MM3 (ref 1.7–7)
NRBC BLD AUTO-RTO: 0 /100 WBC (ref 0–0.2)
PLATELET # BLD AUTO: 197 10*3/MM3 (ref 140–450)
PMV BLD AUTO: 9 FL (ref 6–12)
POTASSIUM SERPL-SCNC: 4.4 MMOL/L (ref 3.5–5.2)
RBC # BLD AUTO: 4.88 10*6/MM3 (ref 4.14–5.8)
SODIUM SERPL-SCNC: 131 MMOL/L (ref 136–145)
WBC # BLD AUTO: 12.17 10*3/MM3 (ref 3.4–10.8)

## 2021-01-13 PROCEDURE — 85025 COMPLETE CBC W/AUTO DIFF WBC: CPT | Performed by: INTERNAL MEDICINE

## 2021-01-13 PROCEDURE — 94799 UNLISTED PULMONARY SVC/PX: CPT

## 2021-01-13 PROCEDURE — 80048 BASIC METABOLIC PNL TOTAL CA: CPT | Performed by: INTERNAL MEDICINE

## 2021-01-13 PROCEDURE — 83036 HEMOGLOBIN GLYCOSYLATED A1C: CPT | Performed by: INTERNAL MEDICINE

## 2021-01-13 PROCEDURE — 82962 GLUCOSE BLOOD TEST: CPT

## 2021-01-13 PROCEDURE — G0378 HOSPITAL OBSERVATION PER HR: HCPCS

## 2021-01-13 PROCEDURE — 63710000001 INSULIN ASPART PER 5 UNITS: Performed by: INTERNAL MEDICINE

## 2021-01-13 PROCEDURE — 83605 ASSAY OF LACTIC ACID: CPT | Performed by: INTERNAL MEDICINE

## 2021-01-13 RX ADMIN — DILTIAZEM HYDROCHLORIDE 120 MG: 120 CAPSULE, COATED, EXTENDED RELEASE ORAL at 08:30

## 2021-01-13 RX ADMIN — GABAPENTIN 600 MG: 300 CAPSULE ORAL at 08:30

## 2021-01-13 RX ADMIN — METOPROLOL SUCCINATE 50 MG: 50 TABLET, EXTENDED RELEASE ORAL at 08:30

## 2021-01-13 RX ADMIN — IPRATROPIUM BROMIDE 2 PUFF: 17 AEROSOL, METERED RESPIRATORY (INHALATION) at 11:07

## 2021-01-13 RX ADMIN — INSULIN ASPART 8 UNITS: 100 INJECTION, SOLUTION INTRAVENOUS; SUBCUTANEOUS at 11:20

## 2021-01-13 RX ADMIN — RANOLAZINE 1000 MG: 500 TABLET, FILM COATED, EXTENDED RELEASE ORAL at 08:30

## 2021-01-13 RX ADMIN — HYDROCODONE BITARTRATE AND ACETAMINOPHEN 1 TABLET: 5; 325 TABLET ORAL at 03:08

## 2021-01-13 RX ADMIN — INSULIN ASPART 15 UNITS: 100 INJECTION, SOLUTION INTRAVENOUS; SUBCUTANEOUS at 11:20

## 2021-01-13 RX ADMIN — THIAMINE HCL TAB 100 MG 200 MG: 100 TAB at 08:30

## 2021-01-13 RX ADMIN — ALBUTEROL SULFATE 2 PUFF: 90 AEROSOL, METERED RESPIRATORY (INHALATION) at 07:44

## 2021-01-13 RX ADMIN — INSULIN ASPART 8 UNITS: 100 INJECTION, SOLUTION INTRAVENOUS; SUBCUTANEOUS at 08:29

## 2021-01-13 RX ADMIN — METHOCARBAMOL 500 MG: 500 TABLET, FILM COATED ORAL at 08:30

## 2021-01-13 RX ADMIN — ALBUTEROL SULFATE 2 PUFF: 90 AEROSOL, METERED RESPIRATORY (INHALATION) at 11:08

## 2021-01-13 RX ADMIN — SODIUM CHLORIDE, PRESERVATIVE FREE 10 ML: 5 INJECTION INTRAVENOUS at 08:31

## 2021-01-13 RX ADMIN — IPRATROPIUM BROMIDE 2 PUFF: 17 AEROSOL, METERED RESPIRATORY (INHALATION) at 07:44

## 2021-01-13 RX ADMIN — INSULIN ASPART 15 UNITS: 100 INJECTION, SOLUTION INTRAVENOUS; SUBCUTANEOUS at 08:30

## 2021-01-13 RX ADMIN — PRASUGREL 10 MG: 10 TABLET, FILM COATED ORAL at 08:30

## 2021-01-13 RX ADMIN — METHOCARBAMOL 500 MG: 500 TABLET, FILM COATED ORAL at 11:20

## 2021-01-13 RX ADMIN — ISOSORBIDE MONONITRATE 120 MG: 60 TABLET, EXTENDED RELEASE ORAL at 05:53

## 2021-01-13 NOTE — PLAN OF CARE
Goal Outcome Evaluation:  Plan of Care Reviewed With: patient  Progress: no change  Outcome Summary: VSS; arias continue to monitor

## 2021-01-13 NOTE — DISCHARGE SUMMARY
[facility] Medicine Services  DISCHARGE SUMMARY       Date of Admission: 1/11/2021  Date of Discharge:  1/13/2021  Primary Care Physician: Mami Perez APRN    Presenting Problem/History of Present Illness:  Shortness of breath [R06.02]  Tachypnea [R06.82]  COVID-19 [U07.1]       Final Discharge Diagnoses:  Chest pain     Atypical; it is with coughing and with deep inspiration; EKG and serial troponin negative for an acute event; discussed with cardiologist, Dr Jenkins, who recommended follow up with his cardiologist, Dr Tejada as outpatient     Paroxysmal atrial fibrillation     On NSR now; continue with xarelto, metoprolol and cardizem     Hyperglycemia on DM type 2     uncontrolled with A1C 12.4     Lactic acidosis      Unknown reason (diabetes, IVFs, sepsis?); I don't see signs of infection just yet; blood cultures negative so far; trending down to normal      procalcitonin normal     Chronic medical problems     ADOLFO on CPAP     Morbid obese     Essential hypertension     Chronic pulmonary embolism     Recent COVID        Consults:   Consults     No orders found from 12/13/2020 to 1/12/2021.          Procedures Performed:  None               Pertinent Test Results:   Lab Results (most recent)     Procedure Component Value Units Date/Time    POC Glucose Once [[id number]]  (Abnormal) Collected: 01/13/21 1108    Specimen: Blood Updated: 01/13/21 1128     Glucose 260 mg/dL      Comment: RN NotifiedOperator: [id number] KEV KEARAMeter ID: [id number]       Basic Metabolic Panel [[id number]]  (Abnormal) Collected: 01/13/21 0610    Specimen: Blood Updated: 01/13/21 0639     Glucose 297 mg/dL      BUN 23 mg/dL      Creatinine 0.77 mg/dL      Sodium 131 mmol/L      Potassium 4.4 mmol/L      Chloride 95 mmol/L      CO2 25.0 mmol/L      Calcium 9.0 mg/dL      eGFR Non African Amer 111 mL/min/1.73      BUN/Creatinine Ratio 29.9     Anion Gap 11.0 mmol/L     Narrative:       GFR Normal >60  Chronic Kidney Disease <60  Kidney Failure <15      Lactic Acid, Plasma [[id number]]  (Abnormal) Collected: 01/13/21 0609    Specimen: Blood Updated: 01/13/21 0637     Lactate 2.5 mmol/L     Hemoglobin A1c [[id number]]  (Abnormal) Collected: 01/13/21 0609    Specimen: Blood Updated: 01/13/21 0637     Hemoglobin A1C 12.40 %     Narrative:      Hemoglobin A1C Ranges:    Increased Risk for Diabetes  5.7% to 6.4%  Diabetes                     >= 6.5%  Diabetic Goal                < 7.0%    CBC & Differential [[id number]]  (Abnormal) Collected: 01/13/21 0609    Specimen: Blood Updated: 01/13/21 0628    Narrative:      The following orders were created for panel order CBC & Differential.  Procedure                               Abnormality         Status                     ---------                               -----------         ------                     CBC Auto Differential[[id number]]        Abnormal            Final result                 Please view results for these tests on the individual orders.    CBC Auto Differential [[id number]]  (Abnormal) Collected: 01/13/21 0609    Specimen: Blood Updated: 01/13/21 0628     WBC 12.17 10*3/mm3      RBC 4.88 10*6/mm3      Hemoglobin 13.7 g/dL      Hematocrit 41.5 %      MCV 85.0 fL      MCH 28.1 pg      MCHC 33.0 g/dL      RDW 13.9 %      RDW-SD 42.6 fl      MPV 9.0 fL      Platelets 197 10*3/mm3      Neutrophil % 69.3 %      Lymphocyte % 17.5 %      Monocyte % 8.1 %      Eosinophil % 1.2 %      Basophil % 0.3 %      Immature Grans % 3.6 %      Neutrophils, Absolute 8.42 10*3/mm3      Lymphocytes, Absolute 2.13 10*3/mm3      Monocytes, Absolute 0.99 10*3/mm3      Eosinophils, Absolute 0.15 10*3/mm3      Basophils, Absolute 0.04 10*3/mm3      Immature Grans, Absolute 0.44 10*3/mm3      nRBC 0.0 /100 WBC     POC Glucose Once [[id number]]  (Abnormal) Collected: 01/11/21 9976    Specimen: Blood Updated: 01/13/21 0418     Glucose 375 mg/dL      Comment: Result  "Not ConfirmedOperator: [id number] TANISHA Raymond ID: [id number]       Blood Culture - Blood, Arm, Left [[id number]] Collected: 01/11/21 1659    Specimen: Blood from Arm, Left Updated: 01/12/21 1715     Blood Culture No growth at 24 hours    Blood Culture - Blood, Arm, Right [[id number]] Collected: 01/11/21 1659    Specimen: Blood from Arm, Right Updated: 01/12/21 1715     Blood Culture No growth at 24 hours    Procalcitonin [[id number]]  (Normal) Collected: 01/12/21 1554    Specimen: Blood Updated: 01/12/21 1632     Procalcitonin 0.06 ng/mL     Narrative:      As a Marker for Sepsis (Non-Neonates):   1. <0.5 ng/mL represents a low risk of severe sepsis and/or septic shock.  1. >2 ng/mL represents a high risk of severe sepsis and/or septic shock.    As a Marker for Lower Respiratory Tract Infections that require antibiotic therapy:  PCT on Admission     Antibiotic Therapy             6-12 Hrs later  > 0.5                Strongly Recommended            >0.25 - <0.5         Recommended  0.1 - 0.25           Discouraged                   Remeasure/reassess PCT  <0.1                 Strongly Discouraged          Remeasure/reassess PCT      As 28 day mortality risk marker: \"Change in Procalcitonin Result\" (> 80 % or <=80 %) if Day 0 (or Day 1) and Day 4 values are available. Refer to http://www.[employer]-pct-calculator.com/   Change in PCT <=80 %   A decrease of PCT levels below or equal to 80 % defines a positive change in PCT test result representing a higher risk for 28-day all-cause mortality of patients diagnosed with severe sepsis or septic shock.  Change in PCT > 80 %   A decrease of PCT levels of more than 80 % defines a negative change in PCT result representing a lower risk for 28-day all-cause mortality of patients diagnosed with severe sepsis or septic shock.                Results may be falsely decreased if patient taking Biotin.     Lactic Acid, Plasma [[id number]]  (Abnormal) Collected: 01/12/21 1554    " Specimen: Blood Updated: 01/12/21 1626     Lactate 3.4 mmol/L     Basic Metabolic Panel [[id number]]  (Abnormal) Collected: 01/12/21 0626    Specimen: Blood Updated: 01/12/21 0702     Glucose 363 mg/dL      BUN 24 mg/dL      Creatinine 0.89 mg/dL      Sodium 130 mmol/L      Potassium 4.8 mmol/L      Chloride 94 mmol/L      CO2 25.0 mmol/L      Calcium 9.4 mg/dL      eGFR Non African Amer 94 mL/min/1.73      BUN/Creatinine Ratio 27.0     Anion Gap 11.0 mmol/L     Narrative:      GFR Normal >60  Chronic Kidney Disease <60  Kidney Failure <15      Troponin [[id number]]  (Normal) Collected: 01/12/21 0626    Specimen: Blood Updated: 01/12/21 0655     Troponin T <0.010 ng/mL     Narrative:      Troponin T Reference Range:  <= 0.03 ng/mL-   Negative for AMI  >0.03 ng/mL-     Abnormal for myocardial necrosis.  Clinicians would have to utilize clinical acumen, EKG, Troponin and serial changes to determine if it is an Acute Myocardial Infarction or myocardial injury due to an underlying chronic condition.       Results may be falsely decreased if patient taking Biotin.      CBC Auto Differential [[id number]]  (Abnormal) Collected: 01/12/21 0626    Specimen: Blood Updated: 01/12/21 0648     WBC 14.62 10*3/mm3      RBC 5.01 10*6/mm3      Hemoglobin 13.9 g/dL      Hematocrit 42.0 %      MCV 83.8 fL      MCH 27.7 pg      MCHC 33.1 g/dL      RDW 13.7 %      RDW-SD 41.5 fl      MPV 9.0 fL      Platelets 209 10*3/mm3      Neutrophil % 81.3 %      Lymphocyte % 10.8 %      Monocyte % 4.6 %      Eosinophil % 0.1 %      Basophil % 0.3 %      Immature Grans % 2.9 %      Neutrophils, Absolute 11.89 10*3/mm3      Lymphocytes, Absolute 1.58 10*3/mm3      Monocytes, Absolute 0.67 10*3/mm3      Eosinophils, Absolute 0.02 10*3/mm3      Basophils, Absolute 0.04 10*3/mm3      Immature Grans, Absolute 0.42 10*3/mm3      nRBC 0.0 /100 WBC     Lactic Acid, Reflex [[id number]]  (Abnormal) Collected: 01/11/21 2128    Specimen: Blood Updated: 01/11/21  2156     Lactate 3.3 mmol/L     Lactic Acid, Reflex Timer (This will reflex a repeat order 3-3:15 hours after ordered.) [[id number]] Collected: 01/11/21 1659    Specimen: Blood Updated: 01/11/21 2100     Hold Tube Hold for add-ons.     Comment: Auto resulted.       Comprehensive Metabolic Panel [[id number]]  (Abnormal) Collected: 01/11/21 1659    Specimen: Blood Updated: 01/11/21 1818     Glucose 496 mg/dL      BUN 18 mg/dL      Creatinine 0.75 mg/dL      Sodium 129 mmol/L      Potassium 5.6 mmol/L      Comment: Slight hemolysis detected by analyzer. Results may be affected.        Chloride 90 mmol/L      CO2 28.0 mmol/L      Calcium 9.2 mg/dL      Total Protein 6.9 g/dL      Albumin 3.60 g/dL      ALT (SGPT) 26 U/L      AST (SGOT) 22 U/L      Comment: Slight hemolysis detected by analyzer. Results may be affected.        Alkaline Phosphatase 84 U/L      Total Bilirubin 0.2 mg/dL      eGFR Non African Amer 114 mL/min/1.73      Globulin 3.3 gm/dL      A/G Ratio 1.1 g/dL      BUN/Creatinine Ratio 24.0     Anion Gap 11.0 mmol/L     Narrative:      GFR Normal >60  Chronic Kidney Disease <60  Kidney Failure <15      Ragan Draw [[id number]] Collected: 01/11/21 1659    Specimen: Blood Updated: 01/11/21 1801    Narrative:      The following orders were created for panel order Ragan Draw.  Procedure                               Abnormality         Status                     ---------                               -----------         ------                     Light Blue Top[[id number]]                                   Final result               Green Top (Gel)[[id number]]                                  Final result               Lavender Top[[id number]]                                     Final result               Gold Top - SST[[id number]]                                   Final result                 Please view results for these tests on the individual orders.    Lavender Top [[id number]] Collected: 01/11/21 1659     Specimen: Blood Updated: 01/11/21 1801     Extra Tube hold for add-on     Comment: Auto resulted       Gold Top - SST [[id number]] Collected: 01/11/21 1659    Specimen: Blood Updated: 01/11/21 1801     Extra Tube Hold for add-ons.     Comment: Auto resulted.       Light Blue Top [[id number]] Collected: 01/11/21 1659    Specimen: Blood Updated: 01/11/21 1801     Extra Tube hold for add-on     Comment: Auto resulted       Green Top (Gel) [[id number]] Collected: 01/11/21 1659    Specimen: Blood Updated: 01/11/21 1801     Extra Tube Hold for add-ons.     Comment: Auto resulted.       Troponin [[id number]]  (Normal) Collected: 01/11/21 1659    Specimen: Blood Updated: 01/11/21 1759     Troponin T <0.010 ng/mL      Comment: Specimen hemolyzed.  Results may be affected.       Narrative:      Troponin T Reference Range:  <= 0.03 ng/mL-   Negative for AMI  >0.03 ng/mL-     Abnormal for myocardial necrosis.  Clinicians would have to utilize clinical acumen, EKG, Troponin and serial changes to determine if it is an Acute Myocardial Infarction or myocardial injury due to an underlying chronic condition.       Results may be falsely decreased if patient taking Biotin.      BNP [[id number]]  (Normal) Collected: 01/11/21 1659    Specimen: Blood Updated: 01/11/21 1747     proBNP 33.5 pg/mL     Narrative:      Among patients with dyspnea, NT-proBNP is highly sensitive for the detection of acute congestive heart failure. In addition NT-proBNP of <300 pg/ml effectively rules out acute congestive heart failure with 99% negative predictive value.    Results may be falsely decreased if patient taking Biotin.      D-dimer, Quantitative [[id number]]  (Normal) Collected: 01/11/21 1659    Specimen: Blood Updated: 01/11/21 1737     D-Dimer, Quantitative 444 ng/mL (FEU)     Narrative:      Dimer values <500 ng/ml FEU are FDA approved as aid in diagnosis of deep venous thrombosis and pulmonary embolism.  This test should not be used in an exclusion  strategy with pretest probability alone.    A recent guideline regarding diagnosis for pulmonary thromboembolism recommends an adjusted exclusion criterion of age x 10 ng/ml FEU for patients >50 years of age (Lori Intern Med 2015; 163: 701-711).      CBC & Differential [[id number]]  (Abnormal) Collected: 01/11/21 1659    Specimen: Blood Updated: 01/11/21 1711    Narrative:      The following orders were created for panel order CBC & Differential.  Procedure                               Abnormality         Status                     ---------                               -----------         ------                     CBC Auto Differential[[id number]]        Abnormal            Final result                 Please view results for these tests on the individual orders.        Imaging Results (Most Recent)     Procedure Component Value Units Date/Time    XR Chest 1 View [[id number]] Collected: 01/11/21 1734     Updated: 01/11/21 1815    Narrative:      PROCEDURE: XR CHEST 1 VW    VIEWS:Single    INDICATION: Sepsis    COMPARISON: CXR: 1/4/2021    FINDINGS:       - lines/tubes: None    - cardiac: Borderline size.    - mediastinum: Contour within normal limits.     - lungs: No evidence of a focal air space process, pulmonary  interstitial edema, nodule(s)/mass. Mild streaky bibasilar  opacities probably represent atelectasis    - pleura: No evidence of  fluid.      - osseous: Unremarkable for age.      Impression:      Very mild streaky bibasilar opacity, favored to represent  atelectasis.      Electronically signed by:  Gela Shaffer MD  1/11/2021 6:14 PM CST  Workstation: [id number]          Chief Complaint on Day of Discharge: None    Hospital Course:  The patient is a 40 y.o. male who presented to [facility] with chest pain and shortness of breath and mild wheezing; he had received treatment in the ED and was admitted for chest pain, which was atypical and with EKG, serial troponin negative and a recent  "heart cath , in September 2020 negative, and discussed with Dr Jenkins, cardiologist, who suggested to follow up as outpatient with Dr Tejada. His lactic acid was elevated, unknown reason, but possible infection at this point in time, is low, since patient had improved and eager to go home,    Condition on Discharge:  stable    Physical Exam on Discharge:  /67 (BP Location: Left arm, Patient Position: Sitting)   Pulse 66   Temp 96.6 °F (35.9 °C) (Oral)   Resp 18   Ht 180.3 cm (71\")   Wt (!) 204 kg (450 lb 9.6 oz)   SpO2 98%   BMI 62.85 kg/m²   Physical Exam  Constitutional:       Appearance: Normal appearance. He is obese.   HENT:      Nose: Nose normal.   Eyes:      Extraocular Movements: Extraocular movements intact.   Neck:      Musculoskeletal: Normal range of motion and neck supple.   Cardiovascular:      Rate and Rhythm: Normal rate and regular rhythm.   Pulmonary:      Effort: Pulmonary effort is normal.      Breath sounds: Normal breath sounds.   Abdominal:      General: Bowel sounds are normal.      Palpations: Abdomen is soft.   Musculoskeletal: Normal range of motion.   Skin:     General: Skin is warm.   Neurological:      Mental Status: He is alert and oriented to person, place, and time. Mental status is at baseline.   Psychiatric:         Mood and Affect: Mood normal.         Behavior: Behavior normal.           Discharge Disposition: Home      Discharge Medications:     Your medication list      CHANGE how you take these medications      Instructions Last Dose Given Next Dose Due   albuterol sulfate  (90 Base) MCG/ACT inhaler  Commonly known as: PROVENTIL HFA;VENTOLIN HFA;PROAIR HFA  What changed: Another medication with the same name was removed. Continue taking this medication, and follow the directions you see here.      Inhale 2 puffs Every 4 (Four) Hours As Needed for Wheezing.          CONTINUE taking these medications      Instructions Last Dose Given Next Dose Due "   atorvastatin 80 MG tablet  Commonly known as: LIPITOR      Take 1 tablet by mouth Every Night.       dilTIAZem  MG 24 hr capsule  Commonly known as: Cardizem CD      Take 1 capsule by mouth Daily.       fenofibrate micronized 134 MG capsule  Commonly known as: LOFIBRA      Take 1 capsule by mouth Every Morning Before Breakfast.       gabapentin 300 MG capsule  Commonly known as: NEURONTIN      Take 2 capsules by mouth 2 (Two) Times a Day.       glucose blood test strip      Use as instructed       glucose monitor monitoring kit      1 each As Needed (check blood glucose 3x daily).       insulin glargine 100 UNIT/ML injection  Commonly known as: LANTUS, SEMGLEE      Inject 60 Units under the skin into the appropriate area as directed Every Night.       insulin lispro 100 UNIT/ML injection  Commonly known as: HumaLOG      Inject 15 Units under the skin into the appropriate area as directed 3 (Three) Times a Day Before Meals.       isosorbide mononitrate 120 MG 24 hr tablet  Commonly known as: IMDUR      Take 1 tablet by mouth Every Morning for 30 days.       lisinopril 40 MG tablet  Commonly known as: PRINIVIL,ZESTRIL      Take 1 tablet by mouth Every Night.       methocarbamol 500 MG tablet  Commonly known as: Robaxin      Take 1 tablet by mouth 4 (Four) Times a Day.       metoprolol succinate XL 50 MG 24 hr tablet  Commonly known as: TOPROL-XL      Take 1 tablet by mouth Daily.       Microlet Lancets misc      One touch delica lancets       nitroglycerin 0.4 MG SL tablet  Commonly known as: NITROSTAT      Place 1 tablet under the tongue Every 5 (Five) Minutes As Needed for Chest Pain for up to 15 days.       pantoprazole 40 MG EC tablet  Commonly known as: PROTONIX      Take 1 tablet by mouth Every Night.       pramipexole 1 MG tablet  Commonly known as: MIRAPEX      Take 2 tablets by mouth Every Night.       prasugrel 10 MG tablet  Commonly known as: EFFIENT      Take 1 tablet by mouth Daily.      "  ranolazine 1000 MG 12 hr tablet  Commonly known as: RANEXA      Take 1 tablet by mouth Every 12 (Twelve) Hours.       ReliOn Insulin Syringe 31G X 15/64\" 0.5 ML misc  Generic drug: Insulin Syringe-Needle U-100      For use with insulin       rivaroxaban 20 MG tablet  Commonly known as: XARELTO      Take 1 tablet by mouth Daily With Dinner.       SITagliptin 100 MG tablet  Commonly known as: Januvia      Take 1 tablet by mouth Daily.       traZODone 300 MG tablet  Commonly known as: DESYREL      Take 1 tablet by mouth every night at bedtime.          STOP taking these medications    dexamethasone 6 MG tablet  Commonly known as: DECADRON        ipratropium 17 MCG/ACT inhaler  Commonly known as: ATROVENT HFA                Discharge Diet: cardiac, diabetic diet    Activity at Discharge: as tolerated    Discharge Care Plan/Instructions: see chart    Follow-up Appointments:   Future Appointments   Date Time Provider Department Center   2/11/2021 11:45 AM Demetri Tejada MD [facility] None       Test Results Pending at Discharge:   Pending Labs     Order Current Status    Blood Culture - Blood, Arm, Left Preliminary result    Blood Culture - Blood, Arm, Right Preliminary result          Satish Caraballo MD    Time: 32 min            "

## 2021-01-13 NOTE — OUTREACH NOTE
Prep Survey      Responses   [facility] patient discharged from?  [place]   Is LACE score < 7 ?  Yes   Emergency Room discharge w/ pulse ox?  No   Eligibility  [facility]   Date of Admission  01/11/21   Date of Discharge  01/13/21   Discharge Disposition  Home or Self Care   Discharge diagnosis  Chest pain,    Hyperglycemia,    COVID19   Does the patient have one of the following disease processes/diagnoses(primary or secondary)?  COVID-19   Does the patient have Home health ordered?  Yes   What is the Home health agency?   [facility]    Is there a DME ordered?  No   Prep survey completed?  Yes          Bety Akhtar RN

## 2021-01-14 ENCOUNTER — TRANSITIONAL CARE MANAGEMENT TELEPHONE ENCOUNTER (OUTPATIENT)
Dept: CALL CENTER | Facility: HOSPITAL | Age: 41
End: 2021-01-14

## 2021-01-14 NOTE — OUTREACH NOTE
Call Center TCM Note      Responses   [facility] patient discharged from?  [place]   Does the patient have one of the following disease processes/diagnoses(primary or secondary)?  COVID-19   COVID-19 underlying condition?  None   TCM attempt successful?  No   Unsuccessful attempts  Attempt 1   Discharge diagnosis  Chest pain,    Hyperglycemia,    COVID19          Betsy Vu LPN    1/14/2021, 11:29 EST

## 2021-01-14 NOTE — OUTREACH NOTE
Call Center TCM Note      Responses   [facility] patient discharged from?  [place]   Does the patient have one of the following disease processes/diagnoses(primary or secondary)?  COVID-19   COVID-19 underlying condition?  None   TCM attempt successful?  Yes   Call start time  1617   Call end time  1620   Discharge diagnosis  Chest pain,    Hyperglycemia,    COVID19   Meds reviewed with patient/caregiver?  Yes   Is the patient having any side effects they believe may be caused by any medication additions or changes?  No   Does the patient have all medications ordered at discharge?  N/A   Is the patient taking all medications as directed (includes completed medication regime)?  Yes   Does the patient have a primary care provider?   Yes   Comments regarding PCP  PCP FOLLOW UP APPOINTMENT IS 1/20/21   Does the patient have an appointment with their PCP or specialist within 7 days of discharge?  Yes   Has the patient kept scheduled appointments due by today?  N/A   What is the Home health agency?   [facility]    Has home health visited the patient within 72 hours of discharge?  Call prior to 72 hours   Did the patient receive a copy of their discharge instructions?  Yes   Did the patient receive a copy of COVID-19 specific instructions?  Yes   Nursing interventions  Reviewed instructions with patient   What is the patient's perception of their health status since discharge?  Improving   Does the patient have any of the following symptoms?  None   Nursing Interventions  Nurse provided patient education   Pulse Ox monitoring  None   Is the patient/caregiver able to teach back steps to recovery at home?  Set small, achievable goals for return to baseline health, Rest and rebuild strength, gradually increase activity, Make a list of questions for provider's appointment   If the patient is a current smoker, are they able to teach back resources for cessation?  Smoking cessation support  groups   Is the patient/caregiver able to teach back the hierarchy of who to call/visit for symptoms/problems? PCP, Specialist, Home health nurse, Urgent Care, ED, 911  Yes   TCM call completed?  Yes          Betys Vu LPN    1/14/2021, 16:26 EST

## 2021-01-15 ENCOUNTER — READMISSION MANAGEMENT (OUTPATIENT)
Dept: CALL CENTER | Facility: HOSPITAL | Age: 41
End: 2021-01-15

## 2021-01-15 NOTE — OUTREACH NOTE
COVID-19 Week 1 Survey      Responses   [facility] patient discharged from?  [place]   Does the patient have one of the following disease processes/diagnoses(primary or secondary)?  COVID-19   COVID-19 underlying condition?  None   Call Number  Call 2   Week 1 Call successful?  Yes   Call start time  1340   Call end time  1344   Discharge diagnosis  Chest pain,    Hyperglycemia,    COVID19   Meds reviewed with patient/caregiver?  Yes   Is the patient having any side effects they believe may be caused by any medication additions or changes?  No   Does the patient have all medications ordered at discharge?  N/A   Is the patient taking all medications as directed (includes completed medication regime)?  Yes   Does the patient have a primary care provider?   Yes   Does the patient have an appointment with their PCP or specialist within 7 days of discharge?  Yes   Has the patient kept scheduled appointments due by today?  N/A   What is the Home health agency?   none scheduled   Has home health visited the patient within 72 hours of discharge?  N/A   Psychosocial issues?  No   Did the patient receive a copy of their discharge instructions?  Yes   Did the patient receive a copy of COVID-19 specific instructions?  Yes   Nursing interventions  Reviewed instructions with patient   What is the patient's perception of their health status since discharge?  Improving   Does the patient have any of the following symptoms?  Loss of taste/smell   Nursing Interventions  Nurse provided patient education   Pulse Ox monitoring  Intermittent   Pulse Ox device source  Patient   O2 Sat comments  96-97% on RA   O2 Sat: education provided  Sat levels, Monitoring frequency, When to seek care   O2 Sat education comments  instructed pt to watch O2 sats closely and consider gojng to ED if falls below 90% and does not come back up quickly along with SOB   Is the patient/caregiver able to teach back steps to recovery at home?  Set  small, achievable goals for return to baseline health, Rest and rebuild strength, gradually increase activity, Eat a well-balance diet   Is the patient/caregiver able to teach back the hierarchy of who to call/visit for symptoms/problems? PCP, Specialist, Home health nurse, Urgent Care, ED, 911  Yes   COVID-19 call completed?  Yes          Paradise Oneill RN

## 2021-01-16 ENCOUNTER — READMISSION MANAGEMENT (OUTPATIENT)
Dept: CALL CENTER | Facility: HOSPITAL | Age: 41
End: 2021-01-16

## 2021-01-16 LAB
BACTERIA SPEC AEROBE CULT: NORMAL
BACTERIA SPEC AEROBE CULT: NORMAL

## 2021-01-16 NOTE — OUTREACH NOTE
COVID-19 Week 1 Survey      Responses   [facility] patient discharged from?  [place]   Does the patient have one of the following disease processes/diagnoses(primary or secondary)?  COVID-19   COVID-19 underlying condition?  None   Call Number  Call 3   Week 1 Call successful?  Yes   Call start time  1056   Call end time  1100   Discharge diagnosis  Chest pain,    Hyperglycemia,    COVID19   Meds reviewed with patient/caregiver?  Yes   Is the patient having any side effects they believe may be caused by any medication additions or changes?  No   Does the patient have all medications ordered at discharge?  N/A   Is the patient taking all medications as directed (includes completed medication regime)?  Yes   Does the patient have a primary care provider?   Yes   Comments regarding PCP  PCP FOLLOW UP APPOINTMENT IS 1/20/21   Does the patient have an appointment with their PCP or specialist within 7 days of discharge?  Yes   Has the patient kept scheduled appointments due by today?  N/A   Has home health visited the patient within 72 hours of discharge?  N/A   Did the patient receive a copy of their discharge instructions?  Yes   Did the patient receive a copy of COVID-19 specific instructions?  Yes   Nursing interventions  Reviewed instructions with patient   What is the patient's perception of their health status since discharge?  Improving   Does the patient have any of the following symptoms?  Loss of taste/smell   Is the patient/caregiver able to teach back steps to recovery at home?  Set small, achievable goals for return to baseline health   COVID-19 call completed?  Yes   Wrap up additional comments  Very sleepy at time of call, but says he is ok.          Diane Marshall RN

## 2021-01-18 ENCOUNTER — HOSPITAL ENCOUNTER (OUTPATIENT)
Facility: HOSPITAL | Age: 41
Setting detail: OBSERVATION
Discharge: HOME | End: 2021-01-20
Attending: EMERGENCY MEDICINE | Admitting: FAMILY MEDICINE

## 2021-01-18 DIAGNOSIS — R73.9 HYPERGLYCEMIA: ICD-10-CM

## 2021-01-18 DIAGNOSIS — A41.9 SEPSIS, DUE TO UNSPECIFIED ORGANISM, UNSPECIFIED WHETHER ACUTE ORGAN DYSFUNCTION PRESENT (HCC): ICD-10-CM

## 2021-01-18 DIAGNOSIS — U07.1 COVID-19 VIRUS INFECTION: ICD-10-CM

## 2021-01-18 DIAGNOSIS — R07.9 CHEST PAIN, UNSPECIFIED TYPE: Primary | ICD-10-CM

## 2021-01-18 PROCEDURE — 93010 ELECTROCARDIOGRAM REPORT: CPT | Performed by: INTERNAL MEDICINE

## 2021-01-18 PROCEDURE — 93005 ELECTROCARDIOGRAM TRACING: CPT | Performed by: EMERGENCY MEDICINE

## 2021-01-18 PROCEDURE — 93005 ELECTROCARDIOGRAM TRACING: CPT

## 2021-01-18 PROCEDURE — 99285 EMERGENCY DEPT VISIT HI MDM: CPT

## 2021-01-19 ENCOUNTER — APPOINTMENT (OUTPATIENT)
Dept: GENERAL RADIOLOGY | Facility: HOSPITAL | Age: 41
End: 2021-01-19

## 2021-01-19 ENCOUNTER — APPOINTMENT (OUTPATIENT)
Dept: CT IMAGING | Facility: HOSPITAL | Age: 41
End: 2021-01-19

## 2021-01-19 ENCOUNTER — READMISSION MANAGEMENT (OUTPATIENT)
Dept: CALL CENTER | Facility: HOSPITAL | Age: 41
End: 2021-01-19

## 2021-01-19 LAB
ALBUMIN SERPL-MCNC: 3.6 G/DL (ref 3.5–5.2)
ALBUMIN/GLOB SERPL: 0.9 G/DL
ALP SERPL-CCNC: 83 U/L (ref 39–117)
ALT SERPL W P-5'-P-CCNC: 26 U/L (ref 1–41)
ANION GAP SERPL CALCULATED.3IONS-SCNC: 12 MMOL/L (ref 5–15)
AST SERPL-CCNC: 19 U/L (ref 1–40)
BASOPHILS # BLD AUTO: 0.04 10*3/MM3 (ref 0–0.2)
BASOPHILS NFR BLD AUTO: 0.3 % (ref 0–1.5)
BILIRUB SERPL-MCNC: 0.4 MG/DL (ref 0–1.2)
BILIRUB UR QL STRIP: NEGATIVE
BUN SERPL-MCNC: 21 MG/DL (ref 6–20)
BUN/CREAT SERPL: 23.6 (ref 7–25)
CALCIUM SPEC-SCNC: 9.7 MG/DL (ref 8.6–10.5)
CHLORIDE SERPL-SCNC: 94 MMOL/L (ref 98–107)
CLARITY UR: CLEAR
CO2 SERPL-SCNC: 23 MMOL/L (ref 22–29)
COLOR UR: YELLOW
CREAT SERPL-MCNC: 0.89 MG/DL (ref 0.76–1.27)
D-DIMER, QUANTITATIVE (MAD,POW, STR): 376 NG/ML (FEU) (ref 0–470)
D-LACTATE SERPL-SCNC: 1.9 MMOL/L (ref 0.5–2)
D-LACTATE SERPL-SCNC: 4 MMOL/L (ref 0.5–2)
DEPRECATED RDW RBC AUTO: 41.1 FL (ref 37–54)
EOSINOPHIL # BLD AUTO: 0 10*3/MM3 (ref 0–0.4)
EOSINOPHIL NFR BLD AUTO: 0 % (ref 0.3–6.2)
ERYTHROCYTE [DISTWIDTH] IN BLOOD BY AUTOMATED COUNT: 13.4 % (ref 12.3–15.4)
FLUAV RNA RESP QL NAA+PROBE: NOT DETECTED
FLUBV RNA RESP QL NAA+PROBE: NOT DETECTED
GFR SERPL CREATININE-BSD FRML MDRD: 94 ML/MIN/1.73
GLOBULIN UR ELPH-MCNC: 3.9 GM/DL
GLUCOSE BLDC GLUCOMTR-MCNC: 268 MG/DL (ref 70–130)
GLUCOSE BLDC GLUCOMTR-MCNC: 270 MG/DL (ref 70–130)
GLUCOSE BLDC GLUCOMTR-MCNC: 278 MG/DL (ref 70–130)
GLUCOSE BLDC GLUCOMTR-MCNC: 311 MG/DL (ref 70–130)
GLUCOSE SERPL-MCNC: 485 MG/DL (ref 65–99)
GLUCOSE UR STRIP-MCNC: ABNORMAL MG/DL
HCT VFR BLD AUTO: 42.4 % (ref 37.5–51)
HGB BLD-MCNC: 14.1 G/DL (ref 13–17.7)
HGB UR QL STRIP.AUTO: NEGATIVE
HOLD SPECIMEN: NORMAL
IMM GRANULOCYTES # BLD AUTO: 0.32 10*3/MM3 (ref 0–0.05)
IMM GRANULOCYTES NFR BLD AUTO: 2.6 % (ref 0–0.5)
KETONES UR QL STRIP: ABNORMAL
LACTATE HOLD SPECIMEN: NORMAL
LEUKOCYTE ESTERASE UR QL STRIP.AUTO: NEGATIVE
LYMPHOCYTES # BLD AUTO: 1.08 10*3/MM3 (ref 0.7–3.1)
LYMPHOCYTES NFR BLD AUTO: 8.7 % (ref 19.6–45.3)
MCH RBC QN AUTO: 28 PG (ref 26.6–33)
MCHC RBC AUTO-ENTMCNC: 33.3 G/DL (ref 31.5–35.7)
MCV RBC AUTO: 84.3 FL (ref 79–97)
MONOCYTES # BLD AUTO: 0.56 10*3/MM3 (ref 0.1–0.9)
MONOCYTES NFR BLD AUTO: 4.5 % (ref 5–12)
NEUTROPHILS NFR BLD AUTO: 10.39 10*3/MM3 (ref 1.7–7)
NEUTROPHILS NFR BLD AUTO: 83.9 % (ref 42.7–76)
NITRITE UR QL STRIP: NEGATIVE
NRBC BLD AUTO-RTO: 0 /100 WBC (ref 0–0.2)
NT-PROBNP SERPL-MCNC: 156.3 PG/ML (ref 0–450)
PH UR STRIP.AUTO: 5.5 [PH] (ref 5–9)
PLATELET # BLD AUTO: 179 10*3/MM3 (ref 140–450)
PMV BLD AUTO: 9.1 FL (ref 6–12)
POTASSIUM SERPL-SCNC: 4.3 MMOL/L (ref 3.5–5.2)
PROT SERPL-MCNC: 7.5 G/DL (ref 6–8.5)
PROT UR QL STRIP: NEGATIVE
QT INTERVAL: 360 MS
QTC INTERVAL: 489 MS
RBC # BLD AUTO: 5.03 10*6/MM3 (ref 4.14–5.8)
SARS-COV-2 RNA RESP QL NAA+PROBE: NOT DETECTED
SODIUM SERPL-SCNC: 129 MMOL/L (ref 136–145)
SP GR UR STRIP: 1.03 (ref 1–1.03)
TROPONIN T SERPL-MCNC: <0.01 NG/ML (ref 0–0.03)
TROPONIN T SERPL-MCNC: <0.01 NG/ML (ref 0–0.03)
UROBILINOGEN UR QL STRIP: ABNORMAL
WBC # BLD AUTO: 12.39 10*3/MM3 (ref 3.4–10.8)

## 2021-01-19 PROCEDURE — 63710000001 INSULIN REGULAR HUMAN PER 5 UNITS: Performed by: EMERGENCY MEDICINE

## 2021-01-19 PROCEDURE — G0378 HOSPITAL OBSERVATION PER HR: HCPCS

## 2021-01-19 PROCEDURE — 96365 THER/PROPH/DIAG IV INF INIT: CPT

## 2021-01-19 PROCEDURE — 84484 ASSAY OF TROPONIN QUANT: CPT | Performed by: EMERGENCY MEDICINE

## 2021-01-19 PROCEDURE — 80053 COMPREHEN METABOLIC PANEL: CPT | Performed by: EMERGENCY MEDICINE

## 2021-01-19 PROCEDURE — 82962 GLUCOSE BLOOD TEST: CPT

## 2021-01-19 PROCEDURE — 83880 ASSAY OF NATRIURETIC PEPTIDE: CPT | Performed by: EMERGENCY MEDICINE

## 2021-01-19 PROCEDURE — 94799 UNLISTED PULMONARY SVC/PX: CPT

## 2021-01-19 PROCEDURE — 25010000002 PIPERACILLIN-TAZOBACTAM: Performed by: EMERGENCY MEDICINE

## 2021-01-19 PROCEDURE — 63710000001 INSULIN DETEMIR PER 5 UNITS: Performed by: INTERNAL MEDICINE

## 2021-01-19 PROCEDURE — 85379 FIBRIN DEGRADATION QUANT: CPT | Performed by: EMERGENCY MEDICINE

## 2021-01-19 PROCEDURE — 83605 ASSAY OF LACTIC ACID: CPT | Performed by: EMERGENCY MEDICINE

## 2021-01-19 PROCEDURE — 87636 SARSCOV2 & INF A&B AMP PRB: CPT | Performed by: EMERGENCY MEDICINE

## 2021-01-19 PROCEDURE — 63710000001 INSULIN ASPART PER 5 UNITS: Performed by: INTERNAL MEDICINE

## 2021-01-19 PROCEDURE — 85025 COMPLETE CBC W/AUTO DIFF WBC: CPT | Performed by: EMERGENCY MEDICINE

## 2021-01-19 PROCEDURE — 71045 X-RAY EXAM CHEST 1 VIEW: CPT

## 2021-01-19 PROCEDURE — 81003 URINALYSIS AUTO W/O SCOPE: CPT | Performed by: EMERGENCY MEDICINE

## 2021-01-19 PROCEDURE — 96361 HYDRATE IV INFUSION ADD-ON: CPT

## 2021-01-19 PROCEDURE — 36415 COLL VENOUS BLD VENIPUNCTURE: CPT | Performed by: EMERGENCY MEDICINE

## 2021-01-19 PROCEDURE — 25010000002 VANCOMYCIN: Performed by: EMERGENCY MEDICINE

## 2021-01-19 PROCEDURE — 70450 CT HEAD/BRAIN W/O DYE: CPT

## 2021-01-19 PROCEDURE — 87040 BLOOD CULTURE FOR BACTERIA: CPT | Performed by: EMERGENCY MEDICINE

## 2021-01-19 RX ORDER — SODIUM CHLORIDE 0.9 % (FLUSH) 0.9 %
10 SYRINGE (ML) INJECTION AS NEEDED
Status: DISCONTINUED | OUTPATIENT
Start: 2021-01-19 | End: 2021-01-20 | Stop reason: HOSPADM

## 2021-01-19 RX ORDER — SODIUM CHLORIDE 0.9 % (FLUSH) 0.9 %
10 SYRINGE (ML) INJECTION AS NEEDED
Status: DISCONTINUED | OUTPATIENT
Start: 2021-01-19 | End: 2021-01-19

## 2021-01-19 RX ORDER — LIDOCAINE 50 MG/G
1 PATCH TOPICAL
Status: DISCONTINUED | OUTPATIENT
Start: 2021-01-19 | End: 2021-01-20 | Stop reason: HOSPADM

## 2021-01-19 RX ORDER — RANOLAZINE 500 MG/1
1000 TABLET, EXTENDED RELEASE ORAL EVERY 12 HOURS SCHEDULED
Status: DISCONTINUED | OUTPATIENT
Start: 2021-01-19 | End: 2021-01-20 | Stop reason: HOSPADM

## 2021-01-19 RX ORDER — PANTOPRAZOLE SODIUM 40 MG/1
40 TABLET, DELAYED RELEASE ORAL NIGHTLY
Status: DISCONTINUED | OUTPATIENT
Start: 2021-01-19 | End: 2021-01-20 | Stop reason: HOSPADM

## 2021-01-19 RX ORDER — METOPROLOL SUCCINATE 50 MG/1
50 TABLET, EXTENDED RELEASE ORAL DAILY
Status: DISCONTINUED | OUTPATIENT
Start: 2021-01-19 | End: 2021-01-20 | Stop reason: HOSPADM

## 2021-01-19 RX ORDER — SODIUM CHLORIDE 0.9 % (FLUSH) 0.9 %
10 SYRINGE (ML) INJECTION EVERY 12 HOURS SCHEDULED
Status: DISCONTINUED | OUTPATIENT
Start: 2021-01-19 | End: 2021-01-20 | Stop reason: HOSPADM

## 2021-01-19 RX ORDER — NICOTINE POLACRILEX 4 MG
15 LOZENGE BUCCAL
Status: DISCONTINUED | OUTPATIENT
Start: 2021-01-19 | End: 2021-01-20 | Stop reason: HOSPADM

## 2021-01-19 RX ORDER — ACETAMINOPHEN 325 MG/1
650 TABLET ORAL EVERY 4 HOURS PRN
Status: DISCONTINUED | OUTPATIENT
Start: 2021-01-19 | End: 2021-01-20 | Stop reason: HOSPADM

## 2021-01-19 RX ORDER — SODIUM CHLORIDE, SODIUM LACTATE, POTASSIUM CHLORIDE, CALCIUM CHLORIDE 600; 310; 30; 20 MG/100ML; MG/100ML; MG/100ML; MG/100ML
100 INJECTION, SOLUTION INTRAVENOUS CONTINUOUS
Status: DISCONTINUED | OUTPATIENT
Start: 2021-01-19 | End: 2021-01-20 | Stop reason: HOSPADM

## 2021-01-19 RX ORDER — ACETAMINOPHEN 500 MG
1000 TABLET ORAL ONCE
Status: COMPLETED | OUTPATIENT
Start: 2021-01-19 | End: 2021-01-19

## 2021-01-19 RX ORDER — ONDANSETRON 4 MG/1
4 TABLET, FILM COATED ORAL EVERY 6 HOURS PRN
Status: DISCONTINUED | OUTPATIENT
Start: 2021-01-19 | End: 2021-01-20 | Stop reason: HOSPADM

## 2021-01-19 RX ORDER — TRAZODONE HYDROCHLORIDE 150 MG/1
300 TABLET ORAL NIGHTLY PRN
Status: DISCONTINUED | OUTPATIENT
Start: 2021-01-20 | End: 2021-01-20 | Stop reason: HOSPADM

## 2021-01-19 RX ORDER — ATORVASTATIN CALCIUM 40 MG/1
80 TABLET, FILM COATED ORAL NIGHTLY
Status: DISCONTINUED | OUTPATIENT
Start: 2021-01-19 | End: 2021-01-20 | Stop reason: HOSPADM

## 2021-01-19 RX ORDER — ISOSORBIDE MONONITRATE 60 MG/1
120 TABLET, EXTENDED RELEASE ORAL EVERY MORNING
Status: DISCONTINUED | OUTPATIENT
Start: 2021-01-19 | End: 2021-01-20 | Stop reason: HOSPADM

## 2021-01-19 RX ORDER — ALBUTEROL SULFATE 90 UG/1
2 AEROSOL, METERED RESPIRATORY (INHALATION) EVERY 4 HOURS PRN
Status: DISCONTINUED | OUTPATIENT
Start: 2021-01-19 | End: 2021-01-20 | Stop reason: HOSPADM

## 2021-01-19 RX ORDER — DEXTROSE MONOHYDRATE 25 G/50ML
25 INJECTION, SOLUTION INTRAVENOUS
Status: DISCONTINUED | OUTPATIENT
Start: 2021-01-19 | End: 2021-01-20 | Stop reason: HOSPADM

## 2021-01-19 RX ORDER — LISINOPRIL 20 MG/1
40 TABLET ORAL NIGHTLY
Status: DISCONTINUED | OUTPATIENT
Start: 2021-01-19 | End: 2021-01-20 | Stop reason: HOSPADM

## 2021-01-19 RX ORDER — PRAMIPEXOLE DIHYDROCHLORIDE 1 MG/1
1 TABLET ORAL NIGHTLY
Status: DISCONTINUED | OUTPATIENT
Start: 2021-01-19 | End: 2021-01-20 | Stop reason: HOSPADM

## 2021-01-19 RX ORDER — DILTIAZEM HYDROCHLORIDE 120 MG/1
120 CAPSULE, COATED, EXTENDED RELEASE ORAL DAILY
Status: DISCONTINUED | OUTPATIENT
Start: 2021-01-19 | End: 2021-01-20 | Stop reason: HOSPADM

## 2021-01-19 RX ORDER — IBUPROFEN 600 MG/1
600 TABLET ORAL EVERY 6 HOURS SCHEDULED
Status: COMPLETED | OUTPATIENT
Start: 2021-01-19 | End: 2021-01-20

## 2021-01-19 RX ORDER — IBUPROFEN 600 MG/1
600 TABLET ORAL EVERY 6 HOURS SCHEDULED
Status: DISCONTINUED | OUTPATIENT
Start: 2021-01-19 | End: 2021-01-19

## 2021-01-19 RX ORDER — METHOCARBAMOL 500 MG/1
500 TABLET, FILM COATED ORAL 4 TIMES DAILY
Status: DISCONTINUED | OUTPATIENT
Start: 2021-01-19 | End: 2021-01-20 | Stop reason: HOSPADM

## 2021-01-19 RX ORDER — GABAPENTIN 300 MG/1
600 CAPSULE ORAL
Status: DISCONTINUED | OUTPATIENT
Start: 2021-01-19 | End: 2021-01-20 | Stop reason: HOSPADM

## 2021-01-19 RX ORDER — PRASUGREL 10 MG/1
10 TABLET, FILM COATED ORAL DAILY
Status: DISCONTINUED | OUTPATIENT
Start: 2021-01-19 | End: 2021-01-20 | Stop reason: HOSPADM

## 2021-01-19 RX ADMIN — RANOLAZINE 1000 MG: 500 TABLET, FILM COATED, EXTENDED RELEASE ORAL at 08:35

## 2021-01-19 RX ADMIN — RIVAROXABAN 20 MG: 10 TABLET, FILM COATED ORAL at 18:05

## 2021-01-19 RX ADMIN — INSULIN ASPART 12 UNITS: 100 INJECTION, SOLUTION INTRAVENOUS; SUBCUTANEOUS at 11:27

## 2021-01-19 RX ADMIN — ISOSORBIDE MONONITRATE 120 MG: 60 TABLET, EXTENDED RELEASE ORAL at 08:35

## 2021-01-19 RX ADMIN — RANOLAZINE 1000 MG: 500 TABLET, FILM COATED, EXTENDED RELEASE ORAL at 21:10

## 2021-01-19 RX ADMIN — METOPROLOL SUCCINATE 50 MG: 50 TABLET, EXTENDED RELEASE ORAL at 08:35

## 2021-01-19 RX ADMIN — METHOCARBAMOL 500 MG: 500 TABLET, FILM COATED ORAL at 21:10

## 2021-01-19 RX ADMIN — SODIUM CHLORIDE, PRESERVATIVE FREE 10 ML: 5 INJECTION INTRAVENOUS at 21:10

## 2021-01-19 RX ADMIN — METHOCARBAMOL 500 MG: 500 TABLET, FILM COATED ORAL at 08:35

## 2021-01-19 RX ADMIN — SODIUM CHLORIDE 2259 ML: 900 INJECTION, SOLUTION INTRAVENOUS at 03:57

## 2021-01-19 RX ADMIN — GABAPENTIN 600 MG: 300 CAPSULE ORAL at 21:10

## 2021-01-19 RX ADMIN — LISINOPRIL 40 MG: 20 TABLET ORAL at 21:10

## 2021-01-19 RX ADMIN — LIDOCAINE 1 PATCH: 50 PATCH CUTANEOUS at 16:42

## 2021-01-19 RX ADMIN — ACETAMINOPHEN 1000 MG: 500 TABLET ORAL at 01:10

## 2021-01-19 RX ADMIN — HUMAN INSULIN 8 UNITS: 100 INJECTION, SOLUTION SUBCUTANEOUS at 03:53

## 2021-01-19 RX ADMIN — PRAMIPEXOLE DIHYDROCHLORIDE 1 MG: 1 TABLET ORAL at 21:09

## 2021-01-19 RX ADMIN — PIPERACILLIN AND TAZOBACTAM 4.5 G: 4; .5 INJECTION, POWDER, LYOPHILIZED, FOR SOLUTION INTRAVENOUS; PARENTERAL at 04:04

## 2021-01-19 RX ADMIN — DILTIAZEM HYDROCHLORIDE 120 MG: 120 CAPSULE, COATED, EXTENDED RELEASE ORAL at 08:36

## 2021-01-19 RX ADMIN — METHOCARBAMOL 500 MG: 500 TABLET, FILM COATED ORAL at 11:27

## 2021-01-19 RX ADMIN — PRASUGREL 10 MG: 10 TABLET, FILM COATED ORAL at 08:35

## 2021-01-19 RX ADMIN — INSULIN ASPART 15 UNITS: 100 INJECTION, SOLUTION INTRAVENOUS; SUBCUTANEOUS at 18:05

## 2021-01-19 RX ADMIN — IBUPROFEN 600 MG: 600 TABLET, FILM COATED ORAL at 18:05

## 2021-01-19 RX ADMIN — GABAPENTIN 600 MG: 300 CAPSULE ORAL at 08:35

## 2021-01-19 RX ADMIN — INSULIN DETEMIR 60 UNITS: 100 INJECTION, SOLUTION SUBCUTANEOUS at 21:12

## 2021-01-19 RX ADMIN — INSULIN ASPART 15 UNITS: 100 INJECTION, SOLUTION INTRAVENOUS; SUBCUTANEOUS at 11:27

## 2021-01-19 RX ADMIN — PANTOPRAZOLE SODIUM 40 MG: 40 TABLET, DELAYED RELEASE ORAL at 21:10

## 2021-01-19 RX ADMIN — INSULIN ASPART 16 UNITS: 100 INJECTION, SOLUTION INTRAVENOUS; SUBCUTANEOUS at 08:35

## 2021-01-19 RX ADMIN — INSULIN ASPART 12 UNITS: 100 INJECTION, SOLUTION INTRAVENOUS; SUBCUTANEOUS at 18:05

## 2021-01-19 RX ADMIN — METHOCARBAMOL 500 MG: 500 TABLET, FILM COATED ORAL at 18:05

## 2021-01-19 RX ADMIN — SODIUM CHLORIDE, POTASSIUM CHLORIDE, SODIUM LACTATE AND CALCIUM CHLORIDE 100 ML/HR: 600; 310; 30; 20 INJECTION, SOLUTION INTRAVENOUS at 21:10

## 2021-01-19 RX ADMIN — VANCOMYCIN HYDROCHLORIDE 2000 MG: 1 INJECTION, POWDER, LYOPHILIZED, FOR SOLUTION INTRAVENOUS at 04:05

## 2021-01-19 RX ADMIN — SODIUM CHLORIDE, PRESERVATIVE FREE 10 ML: 5 INJECTION INTRAVENOUS at 08:36

## 2021-01-19 RX ADMIN — INSULIN ASPART 15 UNITS: 100 INJECTION, SOLUTION INTRAVENOUS; SUBCUTANEOUS at 08:36

## 2021-01-19 RX ADMIN — ATORVASTATIN CALCIUM 80 MG: 40 TABLET, FILM COATED ORAL at 21:10

## 2021-01-19 NOTE — PROGRESS NOTES
Discharge Planning Assessment  [facility]     Patient Name: Yordy Hansen  MRN: [id number]  Today's Date: 1/19/2021    Admit Date: 1/18/2021    Discharge Needs Assessment     Row Name 01/19/21 1242       Living Environment    Lives With  alone    Current Living Arrangements  home/apartment/condo Information on face sheet confirmed with patient.    Primary Care Provided by  self    Provides Primary Care For  no one    Family Caregiver if Needed  parent(s)    Quality of Family Relationships  involved    Able to Return to Prior Arrangements  yes       Resource/Environmental Concerns    Resource/Environmental Concerns  none    Transportation Concerns  car, none       Transition Planning    Patient/Family Anticipates Transition to  home    Patient/Family Anticipated Services at Transition  none    Transportation Anticipated  family or friend will provide Father will provide transportation at d/c.       Discharge Needs Assessment    Readmission Within the Last 30 Days  no previous admission in last 30 days;other (see comments) Recent OBV    Equipment Currently Used at Home  cane, straight;walker, rolling;glucometer;cpap;other (see comments) CPAP provided by [facility] [employer].    Concerns to be Addressed  denies needs/concerns at this time    Anticipated Changes Related to Illness  none    Equipment Needed After Discharge  none    Current Discharge Risk  chronically ill        Discharge Plan     Row Name 01/19/21 1244       Plan    Plan  home with no services    Plan Comments  Assessment completed with patient. Patient denies any needs at d/c. SW discussed  services with patient including a transition visit. Patient declines service. Plan is to return home pending medical stability. Continue with medical management.....Amber CHACKO        Continued Care and Services - Admitted Since 1/18/2021    Coordination has not been started for this encounter.     Selected Continued Care - Prior Encounters Includes  selections from prior encounters from 10/20/2020 to 1/19/2021    Discharged on 1/13/2021 Admission date: 1/11/2021 - Discharge disposition: Home or Self Care    Home Medical Care     Service Provider Selected Services Address Phone Fax Patient Preferred    [facility] [address] [facility] [address] [phone] --                    Expected Discharge Date and Time     Expected Discharge Date Expected Discharge Time    Jan 19, 2021         Demographic Summary     Row Name 01/19/21 1237       General Information    Admission Type  observation    Arrived From  emergency department    Preferred Language  English     Used During This Interaction  no       Contact Information    Contact Information Obtained for          Functional Status     Row Name 01/19/21 1240       Functional Status    Usual Activity Tolerance  good    Current Activity Tolerance  moderate    Functional Status Comments  Patient is independent with ADL's.       Functional Status, IADL    Medications  independent Pharmacy, [facility], and prescription coverage confirmed.    Meal Preparation  independent    Housekeeping  independent    Laundry  independent    Shopping  independent       Mental Status Summary    Recent Changes in Mental Status/Cognitive Functioning  no changes       Employment/    Employment Status  disabled        Psychosocial    No documentation.       Abuse/Neglect    No documentation.       Legal    No documentation.       Substance Abuse    No documentation.       Patient Forms    No documentation.           GINNA Dubois

## 2021-01-19 NOTE — OUTREACH NOTE
COVID-19 Week 2 Survey      Responses   [facility] patient discharged from?  [place]   Does the patient have one of the following disease processes/diagnoses(primary or secondary)?  COVID-19   COVID-19 underlying condition?  None   Call Number  Call 1   COVID-19 Week 2: Call 1 attempt successful?  No   Revoke  Readmitted   Discharge diagnosis  Chest pain,    Hyperglycemia,    COVID19          Tiffanie Alvarez RN

## 2021-01-19 NOTE — PROGRESS NOTES
[facility] Medicine Services  INPATIENT PROGRESS NOTE    Length of Stay: 0  Date of Admission: 1/18/2021  Primary Care Physician: Mami Perez APRN    Subjective   Chief Complaint: chest pain   HPI:  40 year old  male with past medical history of CAD s/p stent, type 2 DM, ADOLFO, atrial fibrillation, chronic pulmonary emboli, COPD, morbid obesity with BMI of 59.97 who presented on 1/18/2021 with complaints of chest pain.  He has had recent COVID infection and reports since illness he has pain with deep breathing in his right chest wall.  He reports pain continues today but slight improvement noted overnight.     Review of Systems   Constitutional: Negative for chills, fatigue and fever.   HENT: Negative for congestion, rhinorrhea and sore throat.    Respiratory: Negative for cough, chest tightness and shortness of breath.    Cardiovascular: Positive for chest pain. Negative for palpitations and leg swelling.   Gastrointestinal: Negative for abdominal pain, diarrhea, nausea and vomiting.   Musculoskeletal: Negative for back pain and neck pain.   Skin: Negative for pallor.   Neurological: Negative for dizziness and weakness.   Psychiatric/Behavioral: Negative for confusion. The patient is not nervous/anxious.         All pertinent negatives and positives are as above. All other systems have been reviewed and are negative unless otherwise stated.     Objective    Temp:  [97 °F (36.1 °C)-98.9 °F (37.2 °C)] 97.6 °F (36.4 °C)  Heart Rate:  [] 80  Resp:  [18-26] 18  BP: (129-186)/(57-85) 148/68    Physical Exam  Vitals signs and nursing note reviewed.   Constitutional:       General: He is not in acute distress.     Appearance: Normal appearance. He is obese. He is not ill-appearing.   HENT:      Head: Normocephalic and atraumatic.      Right Ear: External ear normal.      Left Ear: External ear normal.      Nose: Nose normal.      Mouth/Throat:      Mouth:  Mucous membranes are moist.      Pharynx: Oropharynx is clear.   Eyes:      General: No scleral icterus.        Right eye: No discharge.         Left eye: No discharge.      Conjunctiva/sclera: Conjunctivae normal.   Neck:      Musculoskeletal: Normal range of motion and neck supple.   Cardiovascular:      Rate and Rhythm: Normal rate and regular rhythm.      Pulses: Normal pulses.      Heart sounds: Normal heart sounds. No murmur. No friction rub. No gallop.    Pulmonary:      Effort: Pulmonary effort is normal. No respiratory distress.      Breath sounds: Normal breath sounds. No stridor. No wheezing, rhonchi or rales.   Abdominal:      General: Bowel sounds are normal. There is no distension.      Palpations: Abdomen is soft.      Tenderness: There is no abdominal tenderness.   Musculoskeletal: Normal range of motion.         General: No swelling.   Skin:     General: Skin is warm and dry.   Neurological:      General: No focal deficit present.      Mental Status: He is alert and oriented to person, place, and time.   Psychiatric:         Mood and Affect: Mood normal.         Behavior: Behavior normal.             Results Review:  I have reviewed the labs, radiology results, and diagnostic studies.    Laboratory Data:   Results from last 7 days   Lab Units 01/19/21  0239 01/13/21  0610   SODIUM mmol/L 129* 131*   POTASSIUM mmol/L 4.3 4.4   CHLORIDE mmol/L 94* 95*   CO2 mmol/L 23.0 25.0   BUN mg/dL 21* 23*   CREATININE mg/dL 0.89 0.77   GLUCOSE mg/dL 485* 297*   CALCIUM mg/dL 9.7 9.0   BILIRUBIN mg/dL 0.4  --    ALK PHOS U/L 83  --    ALT (SGPT) U/L 26  --    AST (SGOT) U/L 19  --    ANION GAP mmol/L 12.0 11.0     Estimated Creatinine Clearance: 188.1 mL/min (by C-G formula based on SCr of 0.89 mg/dL).  Results from last 7 days   Lab Units 01/18/21  1436   MAGNESIUM mg/dL 1.8         Results from last 7 days   Lab Units 01/19/21  0239 01/13/21  0609   WBC 10*3/mm3 12.39* 12.17*   HEMOGLOBIN g/dL 14.1 13.7    HEMATOCRIT % 42.4 41.5   PLATELETS 10*3/mm3 179 197           Culture Data:   No results found for: BLOODCX  No results found for: URINECX  No results found for: RESPCX  No results found for: WOUNDCX  No results found for: STOOLCX  No components found for: BODYFLD    Radiology Data:   Imaging Results (Last 24 Hours)     Procedure Component Value Units Date/Time    XR Chest 1 View [[id number]] Collected: 01/19/21 0129     Updated: 01/19/21 0159    Narrative:      EXAM DESCRIPTION:     XR CHEST 1 VW    CLINICAL HISTORY:     chest pain, cough     COMPARISON:     1/11/2021    TECHNIQUE:    Single AP view of the chest    FINDINGS:     Cardiac silhouette is within normal limits. There is no focal  parenchymal or pleural disease. There is no acute osseous process  visualized. Old left-sided rib fractures      Impression:        No evidence of acute cardiopulmonary disease.      Electronically signed by:  Kenneth Brush MD  1/19/2021 1:57 AM CST  Workstation: [id number]    CT Head Without Contrast [[id number]] Collected: 01/19/21 0124     Updated: 01/19/21 0157    Narrative:      CRANIAL CT SCAN WITHOUT CONTRAST    CLINICAL HISTORY: headache    COMPARISON: None that are available at the time of  interpretation. .    TECHNIQUE: Radiation dose reduction techniques were utilized,  including automated exposure control and exposure modulation  based on body size. Multiple axial images of the head were  obtained without contrast.     FINDINGS:  There are no abnormal areas of increased density or  mass effect. Ventricles, sulci, and cisterns appear normal. Bone  window images are unremarkable.        Impression:      1. No acute intracranial abnormality.            Electronically signed by:  Mikey Grace MD  1/19/2021 1:56 AM  CST Workstation: [id number]          I have reviewed the patient's current medications.     Assessment/Plan     Active Hospital Problems    Diagnosis   • COVID-19 virus infection   • Morbid obesity  (CMS/[facility])   • Chest pain, rule out acute myocardial infarction   • Sepsis, unspecified organism (CMS/[facility])   • Type 2 diabetes mellitus without complication, with long-term current use of insulin (CMS/[facility])       Plan:    1. Chest pain: pleuritic in nature as evidenced by pain to palpation of chest wall and pain with cough and inspiration.  Scheduled Motrin started and Lidoderm patch added.   2. CAD with history of stent placement: continue statin, Imdur, Lisinopril, Toprol, Effient, Ranexa.   3. Hx pulmonary emboli/Factor II deficiency: continue Xarelto.   4. Type 2 DM: FSBS AC And HS with SSI.  Continue Levemir.   5. Morbid obesity with BMI of 59.97: heart healthy ADA diet.   6. HTN: continue Toprol, Lisinopril.   7. Recent COVID 19 infection        Discharge Planning: I expect patient to be discharged to home in 1 days.          This document has been electronically signed by LALA Wick on January 19, 2021 17:08 CST

## 2021-01-19 NOTE — PLAN OF CARE
Problem: Adult Inpatient Plan of Care  Goal: Plan of Care Review  Outcome: Ongoing, Progressing  Flowsheets (Taken 1/19/2021 1552)  Progress: improving  Plan of Care Reviewed With: patient  Outcome Summary: vss. no complaints of pain or discomfort. resting between care.   Goal Outcome Evaluation:  Plan of Care Reviewed With: patient  Progress: improving  Outcome Summary: vss. no complaints of pain or discomfort. resting between care.

## 2021-01-19 NOTE — H&P
[facility] Medicine Admission      Date of Admission: 1/18/2021, 05:22 CST      Primary Care Physician: Mami Perez APRN      Chief Complaint: Chest pain    HPI: 40-year-old male with past medical history of morbid obesity, coronary artery disease status post stent, poorly controlled type 2 diabetes mellitus, obstructive sleep apnea, history of atrial fibrillation chronic pulmonary emboli and COPD who presents to the emergency department with worsening chest pain or shortness of breath.  The patient has been hospitalized twice within the past 2 weeks for a very similar presentation.  He notes worsening of his shortness of breath and chest pain over this time and feels uncomfortable going home.  He endorses cough and activity makes the chest pain and shortness of breath worse.  Cough also makes chest pain worse.  He denies fevers, heart palpitations, lightheadedness or dizziness.  He was diagnosed with COVID-19 on 1/5/2021.    Concurrent Medical History:  has a past medical history of CAD (coronary artery disease), Chronic back pain, Chronic pulmonary embolism (CMS/HCC), Coronary artery disease, Diabetes mellitus (CMS/HCC), Factor II deficiency (CMS/HCC), Fatty liver, GERD (gastroesophageal reflux disease), Hyperlipidemia, Hypertension, Morbid obesity (CMS/HCC), RLS (restless legs syndrome), Seizures (CMS/HCC), and Sleep apnea.    Past Surgical History:  has a past surgical history that includes transesophageal echocardiogram (travis); pr rt/lt heart catheters (N/A, 3/15/2017); Cardiac catheterization (N/A, 3/15/2017); Cardiac catheterization (N/A, 3/15/2017); Coronary angioplasty with stent; Cardiac catheterization (N/A, 3/1/2018); Cholecystectomy; and Cardiac catheterization (N/A, 9/2/2020).    Family History: family history includes Cancer in his paternal grandfather; Heart disease in his mother; Obesity in his mother; Sleep apnea in his father.     Social  History:  reports that he quit smoking about 24 years ago. His smoking use included cigarettes. He has a 0.13 pack-year smoking history. His smokeless tobacco use includes snuff. He reports that he does not drink alcohol or use drugs.    Allergies:   Allergies   Allergen Reactions   • Glipizide Other (See Comments) and Unknown (See Comments)     Slurred Speech  Slurred Speech  Hallucinations, Slurred Speech   • Reglan [Metoclopramide] Anxiety   • Tramadol Other (See Comments)     seizures   • Risperidone Other (See Comments)     Slurred speech  Can't stand, trouble breathing, slurred speech         Medications:   Prior to Admission medications    Medication Sig Start Date End Date Taking? Authorizing Provider   albuterol (PROVENTIL HFA;VENTOLIN HFA) 108 (90 BASE) MCG/ACT inhaler Inhale 2 puffs Every 4 (Four) Hours As Needed for Wheezing.   Yes Provider, MD Latrice   atorvastatin (LIPITOR) 80 MG tablet Take 1 tablet by mouth Every Night. 9/29/20  Yes Mami Perez APRN   dilTIAZem CD (Cardizem CD) 120 MG 24 hr capsule Take 1 capsule by mouth Daily. 10/8/20  Yes Judy Crews MD   fenofibrate micronized (LOFIBRA) 134 MG capsule Take 1 capsule by mouth Every Morning Before Breakfast. 9/29/20  Yes Mami Perez APRN   gabapentin (NEURONTIN) 300 MG capsule Take 2 capsules by mouth 2 (Two) Times a Day. 1/8/21  Yes Mami Perez APRN   glucose blood test strip Use as instructed 1/8/21  Yes Mami Perez APRN   glucose monitor monitoring kit 1 each As Needed (check blood glucose 3x daily). 9/29/20  Yes Mami Perez APRN   insulin glargine (LANTUS) 100 UNIT/ML injection Inject 60 Units under the skin into the appropriate area as directed Every Night. 9/29/20  Yes Mami Perez APRN   insulin lispro (HumaLOG) 100 UNIT/ML injection Inject 15 Units under the skin into the appropriate area as directed 3 (Three) Times a Day Before Meals.  Patient taking differently:  "Inject 25 Units under the skin into the appropriate area as directed 3 (Three) Times a Day Before Meals. 9/29/20  Yes Mami Perez APRN   isosorbide mononitrate (IMDUR) 120 MG 24 hr tablet Take 1 tablet by mouth Every Morning for 30 days. 10/10/17 9/29/21 Yes Minesh Gregg MD   lisinopril (PRINIVIL,ZESTRIL) 40 MG tablet Take 1 tablet by mouth Every Night. 9/29/20  Yes Mami Perez APRN   methocarbamol (Robaxin) 500 MG tablet Take 1 tablet by mouth 4 (Four) Times a Day. 9/29/20  Yes Mami Perez APRN   metoprolol succinate XL (TOPROL-XL) 50 MG 24 hr tablet Take 1 tablet by mouth Daily. 9/29/20  Yes Mami Perez APRN   Microlet Lancets misc One touch delica lancets 1/8/21  Yes Mami Perez APRN   nitroglycerin (NITROSTAT) 0.4 MG SL tablet Place 1 tablet under the tongue Every 5 (Five) Minutes As Needed for Chest Pain for up to 15 days. 10/10/17 9/29/21 Yes Minesh Gregg MD   pantoprazole (PROTONIX) 40 MG EC tablet Take 1 tablet by mouth Every Night. 9/29/20  Yes Mami Perez APRN   pramipexole (MIRAPEX) 1 MG tablet Take 2 tablets by mouth Every Night. 9/29/20  Yes Mami Perez APRN   prasugrel (EFFIENT) 10 MG tablet Take 1 tablet by mouth Daily. 9/29/20  Yes Mami Perez APRN   ranolazine (RANEXA) 1000 MG 12 hr tablet Take 1 tablet by mouth Every 12 (Twelve) Hours. 11/10/17  Yes Earnest Perez MD   ReliOn Insulin Syringe 31G X 15/64\" 0.5 ML misc For use with insulin 9/29/20  Yes Mami Perez APRN   rivaroxaban (XARELTO) 20 MG tablet Take 1 tablet by mouth Daily With Dinner. 9/29/20  Yes Mami Perez APRN   SITagliptin (Januvia) 100 MG tablet Take 1 tablet by mouth Daily. 1/8/21  Yes Mami Perez APRN   traZODone (DESYREL) 300 MG tablet Take 1 tablet by mouth every night at bedtime. 9/29/20  Yes Mami Perez APRN       Review of Systems:  A complete 10 point review of systems was " obtained and was negative unless noted in the HPI.    Physical Exam:   Temp:  [97 °F (36.1 °C)-98.9 °F (37.2 °C)] 97 °F (36.1 °C)  Heart Rate:  [] 94  Resp:  [18-26] 18  BP: (129-186)/(58-85) 129/58  Physical Exam  Constitutional:       Appearance: He is obese. He is not ill-appearing or diaphoretic.   HENT:      Head: Normocephalic and atraumatic.      Mouth/Throat:      Mouth: Mucous membranes are moist.      Pharynx: Oropharynx is clear. No oropharyngeal exudate.   Eyes:      Extraocular Movements: Extraocular movements intact.      Pupils: Pupils are equal, round, and reactive to light.   Neck:      Musculoskeletal: Normal range of motion and neck supple.   Cardiovascular:      Rate and Rhythm: Regular rhythm. Tachycardia present.      Pulses: Normal pulses.      Heart sounds: Normal heart sounds.   Pulmonary:      Breath sounds: No wheezing or rales.      Comments: Reduced breath sounds throughout.  No significant respiratory distress.  Abdominal:      General: Bowel sounds are normal.      Palpations: Abdomen is soft.      Tenderness: There is no abdominal tenderness.   Musculoskeletal: Normal range of motion.   Skin:     General: Skin is warm and dry.      Capillary Refill: Capillary refill takes less than 2 seconds.   Neurological:      General: No focal deficit present.      Mental Status: He is alert. Mental status is at baseline.           Results Reviewed:  I have personally reviewed current lab, radiology, and data and agree with results.  Lab Results (last 24 hours)     Procedure Component Value Units Date/Time    Troponin [[id number]]  (Normal) Collected: 01/19/21 0318    Specimen: Blood Updated: 01/19/21 0401     Troponin T <0.010 ng/mL     Narrative:      Troponin T Reference Range:  <= 0.03 ng/mL-   Negative for AMI  >0.03 ng/mL-     Abnormal for myocardial necrosis.  Clinicians would have to utilize clinical acumen, EKG, Troponin and serial changes to determine if it is an Acute Myocardial  Infarction or myocardial injury due to an underlying chronic condition.       Results may be falsely decreased if patient taking Biotin.      Extra Tubes [[id number]] Collected: 01/19/21 0240    Specimen: Blood, Venous Line Updated: 01/19/21 0345    Narrative:      The following orders were created for panel order Extra Tubes.  Procedure                               Abnormality         Status                     ---------                               -----------         ------                     Gold Top - SST[[id number]]                                   Final result                 Please view results for these tests on the individual orders.    Gold Top - SST [[id number]] Collected: 01/19/21 0240    Specimen: Blood Updated: 01/19/21 0345     Extra Tube Hold for add-ons.     Comment: Auto resulted.       COVID-19 and FLU A/B PCR - Swab, Nasopharynx [[id number]]  (Normal) Collected: 01/19/21 0208    Specimen: Swab from Nasopharynx Updated: 01/19/21 0344     COVID19 Not Detected     Influenza A PCR Not Detected     Influenza B PCR Not Detected    Narrative:      Fact sheet for providers: https://www.fda.gov/media/573652/download    Fact sheet for patients: https://www.fda.gov/media/406795/download    Test performed by PCR.    Urinalysis With Microscopic If Indicated (No Culture) - Urine, Clean Catch [[id number]]  (Abnormal) Collected: 01/19/21 0206    Specimen: Urine, Clean Catch Updated: 01/19/21 0331     Color, UA Yellow     Appearance, UA Clear     pH, UA 5.5     Specific [place], UA 1.033     Comment: Result obtained by Refractometer        Glucose, UA >=1000 mg/dL (3+)     Ketones, UA Trace     Bilirubin, UA Negative     Blood, UA Negative     Protein, UA Negative     Leuk Esterase, UA Negative     Nitrite, UA Negative     Urobilinogen, UA 0.2 E.U./dL    Narrative:      Urine microscopic not indicated.    D-dimer, Quantitative [[id number]]  (Normal) Collected: 01/19/21 0239    Specimen: Blood Updated: 01/19/21  0327     D-Dimer, Quantitative 376 ng/mL (FEU)     Narrative:      Dimer values <500 ng/ml FEU are FDA approved as aid in diagnosis of deep venous thrombosis and pulmonary embolism.  This test should not be used in an exclusion strategy with pretest probability alone.    A recent guideline regarding diagnosis for pulmonary thromboembolism recommends an adjusted exclusion criterion of age x 10 ng/ml FEU for patients >50 years of age ([employer]ori Intern Med 2015; 163: 701-711).      Comprehensive Metabolic Panel [[id number]]  (Abnormal) Collected: 01/19/21 0239    Specimen: Blood Updated: 01/19/21 0316     Glucose 485 mg/dL      BUN 21 mg/dL      Creatinine 0.89 mg/dL      Sodium 129 mmol/L      Potassium 4.3 mmol/L      Chloride 94 mmol/L      CO2 23.0 mmol/L      Calcium 9.7 mg/dL      Total Protein 7.5 g/dL      Albumin 3.60 g/dL      ALT (SGPT) 26 U/L      AST (SGOT) 19 U/L      Alkaline Phosphatase 83 U/L      Total Bilirubin 0.4 mg/dL      eGFR Non African Amer 94 mL/min/1.73      Globulin 3.9 gm/dL      A/G Ratio 0.9 g/dL      BUN/Creatinine Ratio 23.6     Anion Gap 12.0 mmol/L     Narrative:      GFR Normal >60  Chronic Kidney Disease <60  Kidney Failure <15      Lactic Acid, Plasma [[id number]]  (Abnormal) Collected: 01/19/21 0238    Specimen: Blood Updated: 01/19/21 0316     Lactate 4.0 mmol/L     Lactic Acid, Reflex Timer (This will reflex a repeat order 3-3:15 hours after ordered.) [[id number]] Collected: 01/19/21 0238    Specimen: Blood Updated: 01/19/21 0316    Troponin [[id number]]  (Normal) Collected: 01/19/21 0239    Specimen: Blood Updated: 01/19/21 0302     Troponin T <0.010 ng/mL     Narrative:      Troponin T Reference Range:  <= 0.03 ng/mL-   Negative for AMI  >0.03 ng/mL-     Abnormal for myocardial necrosis.  Clinicians would have to utilize clinical acumen, EKG, Troponin and serial changes to determine if it is an Acute Myocardial Infarction or myocardial injury due to an underlying chronic condition.        Results may be falsely decreased if patient taking Biotin.      BNP [[id number]]  (Normal) Collected: 01/19/21 0239    Specimen: Blood Updated: 01/19/21 0300     proBNP 156.3 pg/mL     Narrative:      Among patients with dyspnea, NT-proBNP is highly sensitive for the detection of acute congestive heart failure. In addition NT-proBNP of <300 pg/ml effectively rules out acute congestive heart failure with 99% negative predictive value.    Results may be falsely decreased if patient taking Biotin.      CBC & Differential [[id number]]  (Abnormal) Collected: 01/19/21 0239    Specimen: Blood Updated: 01/19/21 0242    Narrative:      The following orders were created for panel order CBC & Differential.  Procedure                               Abnormality         Status                     ---------                               -----------         ------                     CBC Auto Differential[[id number]]        Abnormal            Final result                 Please view results for these tests on the individual orders.    CBC Auto Differential [[id number]]  (Abnormal) Collected: 01/19/21 0239    Specimen: Blood Updated: 01/19/21 0242     WBC 12.39 10*3/mm3      RBC 5.03 10*6/mm3      Hemoglobin 14.1 g/dL      Hematocrit 42.4 %      MCV 84.3 fL      MCH 28.0 pg      MCHC 33.3 g/dL      RDW 13.4 %      RDW-SD 41.1 fl      MPV 9.1 fL      Platelets 179 10*3/mm3      Neutrophil % 83.9 %      Lymphocyte % 8.7 %      Monocyte % 4.5 %      Eosinophil % 0.0 %      Basophil % 0.3 %      Immature Grans % 2.6 %      Neutrophils, Absolute 10.39 10*3/mm3      Lymphocytes, Absolute 1.08 10*3/mm3      Monocytes, Absolute 0.56 10*3/mm3      Eosinophils, Absolute 0.00 10*3/mm3      Basophils, Absolute 0.04 10*3/mm3      Immature Grans, Absolute 0.32 10*3/mm3      nRBC 0.0 /100 WBC     Blood Culture - Blood, Hand, Right [[id number]] Collected: 01/19/21 0239    Specimen: Blood from Hand, Right Updated: 01/19/21 0239    Blood Culture -  Blood, Arm, Right [[id number]] Collected: 01/19/21 0239    Specimen: Blood from Arm, Right Updated: 01/19/21 0239        Imaging Results (Last 24 Hours)     Procedure Component Value Units Date/Time    XR Chest 1 View [[id number]] Collected: 01/19/21 0129     Updated: 01/19/21 0159    Narrative:      EXAM DESCRIPTION:     XR CHEST 1 VW    CLINICAL HISTORY:     chest pain, cough     COMPARISON:     1/11/2021    TECHNIQUE:    Single AP view of the chest    FINDINGS:     Cardiac silhouette is within normal limits. There is no focal  parenchymal or pleural disease. There is no acute osseous process  visualized. Old left-sided rib fractures      Impression:        No evidence of acute cardiopulmonary disease.      Electronically signed by:  Kenneth Brush MD  1/19/2021 1:57 AM CST  Workstation: [id number]    CT Head Without Contrast [[id number]] Collected: 01/19/21 0124     Updated: 01/19/21 0157    Narrative:      CRANIAL CT SCAN WITHOUT CONTRAST    CLINICAL HISTORY: headache    COMPARISON: None that are available at the time of  interpretation. .    TECHNIQUE: Radiation dose reduction techniques were utilized,  including automated exposure control and exposure modulation  based on body size. Multiple axial images of the head were  obtained without contrast.     FINDINGS:  There are no abnormal areas of increased density or  mass effect. Ventricles, sulci, and cisterns appear normal. Bone  window images are unremarkable.        Impression:      1. No acute intracranial abnormality.            Electronically signed by:  Mikey Grace MD  1/19/2021 1:56 AM  CST Workstation: [id number]            Assessment:    Active Hospital Problems    Diagnosis   • COVID-19 virus infection   • Morbid obesity (CMS/HCC)   • Chest pain, rule out acute myocardial infarction   • Sepsis, unspecified organism (CMS/HCC)   • Type 2 diabetes mellitus without complication, with long-term current use of insulin (CMS/HCC)             Plan:    #1.   Chest pain.  #2.  Coronary artery disease with history of stent placement.  #3.  Elevated lactic acid.  #4.  Concern for sepsis.  #5.  Prior COVID-19 infection.    Chest x-ray negative for any acute changes compared to previous examinations.  CT of the head performed in the context of headache which was negative for any acute intracranial abnormalities.  Laboratory evaluation revealing hyperglycemia, x2 - troponin enzymes, proBNP within normal limits, negative D-dimer, mild leukocytosis which is stable, and persistent lactic acidosis which has been demonstrated persistently over the past 2 weeks.  Influenza and COVID-19 testing negative.  Urinalysis negative.    Supportive management with IV fluids overnight, trend troponin enzymes and monitor on telemetry.  Anticipate the patient will be stable for discharge in the morning.  Will require follow-up with cardiology in the outpatient setting.    CODE STATUS: Full code  DVT prophylaxis: Xarelto  Diet: Diabetic cardiac  Disposition: Observation admission, anticipate discharge within 48 hours.        Jose F Cook MD  Hospitalist Service

## 2021-01-19 NOTE — ED PROVIDER NOTES
Subjective   40-year-old white male with a history of multiple medical problems including coronary artery disease status post stents, atrial fibrillation, pulmonary emboli, anticoagulated with Xarelto, COPD, type 2 diabetes, and recently diagnosed with COVID-19 presents to the emergency department chief complaint of chest pain.  Patient complains of chest pain, shortness of breath, and nonproductive cough for 2 weeks.          Review of Systems   Constitutional: Positive for chills, diaphoresis and fatigue. Negative for fever.   Respiratory: Positive for cough and shortness of breath.    Cardiovascular: Positive for chest pain.   Gastrointestinal: Negative for abdominal pain, nausea and vomiting.   Genitourinary: Negative for dysuria.   Musculoskeletal: Negative for back pain and neck pain.   Neurological: Positive for headaches. Negative for seizures, syncope and weakness.   All other systems reviewed and are negative.      Past Medical History:   Diagnosis Date   • CAD (coronary artery disease)    • Chronic back pain    • Chronic pulmonary embolism (CMS/HCC)    • Coronary artery disease    • Diabetes mellitus (CMS/HCC)    • Factor II deficiency (CMS/HCC)    • Fatty liver    • GERD (gastroesophageal reflux disease)    • Hyperlipidemia    • Hypertension    • Morbid obesity (CMS/HCC)    • RLS (restless legs syndrome)    • Seizures (CMS/HCC)    • Sleep apnea        Allergies   Allergen Reactions   • Glipizide Other (See Comments) and Unknown (See Comments)     Slurred Speech  Slurred Speech  Hallucinations, Slurred Speech   • Reglan [Metoclopramide] Anxiety   • Tramadol Other (See Comments)     seizures   • Risperidone Other (See Comments)     Slurred speech  Can't stand, trouble breathing, slurred speech         Past Surgical History:   Procedure Laterality Date   • CARDIAC CATHETERIZATION N/A 3/15/2017   • CARDIAC CATHETERIZATION N/A 3/15/2017   • CARDIAC CATHETERIZATION N/A 3/1/2018   • CARDIAC CATHETERIZATION N/A  2020   • CHOLECYSTECTOMY     • CORONARY ANGIOPLASTY WITH STENT PLACEMENT     • LA RT/LT HEART CATHETERS N/A 3/15/2017   • TRANSESOPHAGEAL ECHOCARDIOGRAM ([place])         Family History   Problem Relation Age of Onset   • Heart disease Mother    • Obesity Mother    • Sleep apnea Father    • Cancer Paternal Grandfather        Social History     Socioeconomic History   • Marital status:      Spouse name: Cori   • Number of children: 0   • Years of education: Not on file   • Highest education level: Not on file   Occupational History   • Occupation: Disabled   Tobacco Use   • Smoking status: Former Smoker     Packs/day: 0.25     Years: 0.50     Pack years: 0.12     Types: Cigarettes     Quit date:      Years since quittin.0   • Smokeless tobacco: Current User     Types: Snuff   Substance and Sexual Activity   • Alcohol use: No   • Drug use: No   • Sexual activity: Not Currently           Objective   Physical Exam  Vitals signs and nursing note reviewed.   Constitutional:       General: He is not in acute distress.     Appearance: He is obese. He is not diaphoretic.   HENT:      Head: Normocephalic and atraumatic.      Right Ear: External ear normal.      Left Ear: External ear normal.      Nose: Nose normal.      Mouth/Throat:      Mouth: Mucous membranes are moist.      Pharynx: Oropharynx is clear.   Eyes:      Extraocular Movements: Extraocular movements intact.      Conjunctiva/sclera: Conjunctivae normal.      Pupils: Pupils are equal, round, and reactive to light.   Neck:      Musculoskeletal: Normal range of motion and neck supple.   Cardiovascular:      Rate and Rhythm: Regular rhythm. Tachycardia present.      Pulses: Normal pulses.      Heart sounds: Normal heart sounds.   Pulmonary:      Effort: Pulmonary effort is normal.      Breath sounds: Normal breath sounds.   Abdominal:      General: Bowel sounds are normal. There is no distension.      Palpations: Abdomen is soft. There is no mass.       Tenderness: There is no abdominal tenderness. There is no guarding.   Musculoskeletal: Normal range of motion.   Skin:     General: Skin is warm and dry.   Neurological:      General: No focal deficit present.      Mental Status: He is alert and oriented to person, place, and time.   Psychiatric:         Mood and Affect: Mood is anxious.         Behavior: Behavior normal.         ECG 12 Lead      Date/Time: 1/18/2021 11:26 PM  Performed by: Santhosh Covington MD  Authorized by: Santhosh Covington MD   Interpreted by physician  Clinical impression: abnormal ECG  Comments: Sinus tachycardia rate of 111.  Baseline artifact.  Left posterior fascicular block.  No ST elevation.  Q waves inferior leads.                 ED Course  ED Course as of Jan 19 0336   Tue Jan 19, 2021 0330 Patient is alert and resting comfortably in no acute distress.  I reviewed the results of his evaluation with him and recommended admission for observation.  I paged the hospitalist.    [DR]   0336 Case discussed with Dr. Cook.  He agrees to admit the patient to telemetry.    [DR]      ED Course User Index  [DR] Santhosh Covington MD           Labs Reviewed   COMPREHENSIVE METABOLIC PANEL - Abnormal; Notable for the following components:       Result Value    Glucose 485 (*)     BUN 21 (*)     Sodium 129 (*)     Chloride 94 (*)     All other components within normal limits    Narrative:     GFR Normal >60  Chronic Kidney Disease <60  Kidney Failure <15     URINALYSIS W/ MICROSCOPIC IF INDICATED (NO CULTURE) - Abnormal; Notable for the following components:    Specific Gravity, UA 1.033 (*)     Glucose, UA >=1000 mg/dL (3+) (*)     Ketones, UA Trace (*)     All other components within normal limits    Narrative:     Urine microscopic not indicated.   LACTIC ACID, PLASMA - Abnormal; Notable for the following components:    Lactate 4.0 (*)     All other components within normal limits   CBC WITH AUTO DIFFERENTIAL - Abnormal; Notable for the following  components:    WBC 12.39 (*)     Neutrophil % 83.9 (*)     Lymphocyte % 8.7 (*)     Monocyte % 4.5 (*)     Eosinophil % 0.0 (*)     Immature Grans % 2.6 (*)     Neutrophils, Absolute 10.39 (*)     Immature Grans, Absolute 0.32 (*)     All other components within normal limits   BNP (IN-HOUSE) - Normal    Narrative:     Among patients with dyspnea, NT-proBNP is highly sensitive for the detection of acute congestive heart failure. In addition NT-proBNP of <300 pg/ml effectively rules out acute congestive heart failure with 99% negative predictive value.    Results may be falsely decreased if patient taking Biotin.     D-DIMER, QUANTITATIVE - Normal    Narrative:     Dimer values <500 ng/ml FEU are FDA approved as aid in diagnosis of deep venous thrombosis and pulmonary embolism.  This test should not be used in an exclusion strategy with pretest probability alone.    A recent guideline regarding diagnosis for pulmonary thromboembolism recommends an adjusted exclusion criterion of age x 10 ng/ml FEU for patients >50 years of age (Lori Intern Med 2015; 163: 701-711).     TROPONIN (IN-HOUSE) - Normal    Narrative:     Troponin T Reference Range:  <= 0.03 ng/mL-   Negative for AMI  >0.03 ng/mL-     Abnormal for myocardial necrosis.  Clinicians would have to utilize clinical acumen, EKG, Troponin and serial changes to determine if it is an Acute Myocardial Infarction or myocardial injury due to an underlying chronic condition.       Results may be falsely decreased if patient taking Biotin.     BLOOD CULTURE   BLOOD CULTURE   COVID-19 AND FLU A/B, NP SWAB IN TRANSPORT MEDIA 8-12 HR TAT   TROPONIN (IN-HOUSE)   LACTIC ACID REFLEX TIMER   CBC AND DIFFERENTIAL    Narrative:     The following orders were created for panel order CBC & Differential.  Procedure                               Abnormality         Status                     ---------                               -----------         ------                     CBC Auto  Differential[[id number]]        Abnormal            Final result                 Please view results for these tests on the individual orders.   EXTRA TUBES    Narrative:     The following orders were created for panel order Extra Tubes.  Procedure                               Abnormality         Status                     ---------                               -----------         ------                     Gold Top - SST[[id number]]                                   In process                   Please view results for these tests on the individual orders.   GOLD TOP - SST     Ct Head Without Contrast    Result Date: 1/19/2021  Narrative: CRANIAL CT SCAN WITHOUT CONTRAST CLINICAL HISTORY: headache COMPARISON: None that are available at the time of interpretation. . TECHNIQUE: Radiation dose reduction techniques were utilized, including automated exposure control and exposure modulation based on body size. Multiple axial images of the head were obtained without contrast. FINDINGS:  There are no abnormal areas of increased density or mass effect. Ventricles, sulci, and cisterns appear normal. Bone window images are unremarkable.     Impression: 1. No acute intracranial abnormality. Electronically signed by:  Mikey Grace MD  1/19/2021 1:56 AM CST Workstation: [id number]    Xr Chest 1 View    Result Date: 1/19/2021  Narrative: EXAM DESCRIPTION: XR CHEST 1 VW CLINICAL HISTORY: chest pain, cough COMPARISON: 1/11/2021 TECHNIQUE: Single AP view of the chest FINDINGS: Cardiac silhouette is within normal limits. There is no focal parenchymal or pleural disease. There is no acute osseous process visualized. Old left-sided rib fractures     Impression: No evidence of acute cardiopulmonary disease. Electronically signed by:  Kenneth Brush MD  1/19/2021 1:57 AM CST Workstation: [id number]    Xr Chest 1 View    Result Date: 1/11/2021  Narrative: PROCEDURE: XR CHEST 1 VW VIEWS:Single INDICATION: Sepsis COMPARISON: CXR: 1/4/2021  FINDINGS:   - lines/tubes: None   - cardiac: Borderline size.   - mediastinum: Contour within normal limits.   - lungs: No evidence of a focal air space process, pulmonary interstitial edema, nodule(s)/mass. Mild streaky bibasilar opacities probably represent atelectasis   - pleura: No evidence of  fluid.    - osseous: Unremarkable for age.     Impression: Very mild streaky bibasilar opacity, favored to represent atelectasis. Electronically signed by:  Gela Shaffer MD  1/11/2021 6:14 PM CST Workstation: [id number]    Xr Chest 1 View    Result Date: 1/4/2021  Narrative: EXAM DESCRIPTION: XR CHEST 1 VW CLINICAL HISTORY: Chest Pain triage protocol Chest Pain Triage Protocol COMPARISON: 12/19/2020 TECHNIQUE: Single AP view of the chest FINDINGS: Cardiac silhouette is within normal limits. There is no focal parenchymal or pleural disease. There is no acute osseous process visualized.     Impression: No evidence of acute cardiopulmonary disease. Electronically signed by:  Kenneth Brush MD  1/4/2021 10:53 PM CST Workstation: [id number]                                HEART Score (for prediction of 6-week risk of major adverse cardiac event) reviewed and/or performed as part of the patient evaluation and treatment planning process.  The result associated with this review/performance is: 4       MDM    Final diagnoses:   Chest pain, unspecified type   Sepsis, due to unspecified organism, unspecified whether acute organ dysfunction present (CMS/HCC)   Hyperglycemia   COVID-19 virus infection            Santhosh Covington MD  01/19/21 033

## 2021-01-20 ENCOUNTER — READMISSION MANAGEMENT (OUTPATIENT)
Dept: CALL CENTER | Facility: HOSPITAL | Age: 41
End: 2021-01-20

## 2021-01-20 VITALS
HEART RATE: 65 BPM | RESPIRATION RATE: 18 BRPM | BODY MASS INDEX: 44.1 KG/M2 | TEMPERATURE: 96.1 F | OXYGEN SATURATION: 94 % | WEIGHT: 315 LBS | DIASTOLIC BLOOD PRESSURE: 62 MMHG | SYSTOLIC BLOOD PRESSURE: 136 MMHG | HEIGHT: 71 IN

## 2021-01-20 LAB
ANION GAP SERPL CALCULATED.3IONS-SCNC: 9 MMOL/L (ref 5–15)
BASOPHILS # BLD AUTO: 0.07 10*3/MM3 (ref 0–0.2)
BASOPHILS NFR BLD AUTO: 0.9 % (ref 0–1.5)
BUN SERPL-MCNC: 17 MG/DL (ref 6–20)
BUN/CREAT SERPL: 25.4 (ref 7–25)
CALCIUM SPEC-SCNC: 9.1 MG/DL (ref 8.6–10.5)
CHLORIDE SERPL-SCNC: 99 MMOL/L (ref 98–107)
CO2 SERPL-SCNC: 25 MMOL/L (ref 22–29)
CREAT SERPL-MCNC: 0.67 MG/DL (ref 0.76–1.27)
DEPRECATED RDW RBC AUTO: 40.3 FL (ref 37–54)
EOSINOPHIL # BLD AUTO: 0.24 10*3/MM3 (ref 0–0.4)
EOSINOPHIL NFR BLD AUTO: 2.9 % (ref 0.3–6.2)
ERYTHROCYTE [DISTWIDTH] IN BLOOD BY AUTOMATED COUNT: 13.7 % (ref 12.3–15.4)
GFR SERPL CREATININE-BSD FRML MDRD: 130 ML/MIN/1.73
GLUCOSE BLDC GLUCOMTR-MCNC: 227 MG/DL (ref 70–130)
GLUCOSE BLDC GLUCOMTR-MCNC: 231 MG/DL (ref 70–130)
GLUCOSE SERPL-MCNC: 261 MG/DL (ref 65–99)
HCT VFR BLD AUTO: 37.6 % (ref 37.5–51)
HGB BLD-MCNC: 13 G/DL (ref 13–17.7)
IMM GRANULOCYTES # BLD AUTO: 0.24 10*3/MM3 (ref 0–0.05)
IMM GRANULOCYTES NFR BLD AUTO: 2.9 % (ref 0–0.5)
LYMPHOCYTES # BLD AUTO: 2.63 10*3/MM3 (ref 0.7–3.1)
LYMPHOCYTES NFR BLD AUTO: 32 % (ref 19.6–45.3)
MCH RBC QN AUTO: 28.4 PG (ref 26.6–33)
MCHC RBC AUTO-ENTMCNC: 34.6 G/DL (ref 31.5–35.7)
MCV RBC AUTO: 82.1 FL (ref 79–97)
MONOCYTES # BLD AUTO: 0.77 10*3/MM3 (ref 0.1–0.9)
MONOCYTES NFR BLD AUTO: 9.4 % (ref 5–12)
NEUTROPHILS NFR BLD AUTO: 4.28 10*3/MM3 (ref 1.7–7)
NEUTROPHILS NFR BLD AUTO: 51.9 % (ref 42.7–76)
NRBC BLD AUTO-RTO: 0 /100 WBC (ref 0–0.2)
PLAT MORPH BLD: NORMAL
PLATELET # BLD AUTO: 150 10*3/MM3 (ref 140–450)
PMV BLD AUTO: 9.3 FL (ref 6–12)
POTASSIUM SERPL-SCNC: 4.2 MMOL/L (ref 3.5–5.2)
RBC # BLD AUTO: 4.58 10*6/MM3 (ref 4.14–5.8)
RBC MORPH BLD: NORMAL
SODIUM SERPL-SCNC: 133 MMOL/L (ref 136–145)
WBC # BLD AUTO: 8.23 10*3/MM3 (ref 3.4–10.8)
WBC MORPH BLD: NORMAL

## 2021-01-20 PROCEDURE — 82962 GLUCOSE BLOOD TEST: CPT

## 2021-01-20 PROCEDURE — 63710000001 INSULIN ASPART PER 5 UNITS: Performed by: INTERNAL MEDICINE

## 2021-01-20 PROCEDURE — 36415 COLL VENOUS BLD VENIPUNCTURE: CPT | Performed by: INTERNAL MEDICINE

## 2021-01-20 PROCEDURE — 96361 HYDRATE IV INFUSION ADD-ON: CPT

## 2021-01-20 PROCEDURE — 85025 COMPLETE CBC W/AUTO DIFF WBC: CPT | Performed by: INTERNAL MEDICINE

## 2021-01-20 PROCEDURE — G0378 HOSPITAL OBSERVATION PER HR: HCPCS

## 2021-01-20 PROCEDURE — 80048 BASIC METABOLIC PNL TOTAL CA: CPT | Performed by: INTERNAL MEDICINE

## 2021-01-20 PROCEDURE — 85007 BL SMEAR W/DIFF WBC COUNT: CPT | Performed by: INTERNAL MEDICINE

## 2021-01-20 RX ORDER — LIDOCAINE 50 MG/G
1 PATCH TOPICAL
Qty: 30 EACH | Refills: 0 | Status: SHIPPED | OUTPATIENT
Start: 2021-01-21 | End: 2021-02-02 | Stop reason: SDDI

## 2021-01-20 RX ADMIN — INSULIN ASPART 8 UNITS: 100 INJECTION, SOLUTION INTRAVENOUS; SUBCUTANEOUS at 11:22

## 2021-01-20 RX ADMIN — INSULIN ASPART 8 UNITS: 100 INJECTION, SOLUTION INTRAVENOUS; SUBCUTANEOUS at 08:48

## 2021-01-20 RX ADMIN — ISOSORBIDE MONONITRATE 120 MG: 60 TABLET, EXTENDED RELEASE ORAL at 06:14

## 2021-01-20 RX ADMIN — PRASUGREL 10 MG: 10 TABLET, FILM COATED ORAL at 08:48

## 2021-01-20 RX ADMIN — INSULIN ASPART 15 UNITS: 100 INJECTION, SOLUTION INTRAVENOUS; SUBCUTANEOUS at 11:23

## 2021-01-20 RX ADMIN — GABAPENTIN 600 MG: 300 CAPSULE ORAL at 08:48

## 2021-01-20 RX ADMIN — IBUPROFEN 600 MG: 600 TABLET, FILM COATED ORAL at 00:33

## 2021-01-20 RX ADMIN — METOPROLOL SUCCINATE 50 MG: 50 TABLET, EXTENDED RELEASE ORAL at 08:48

## 2021-01-20 RX ADMIN — IBUPROFEN 600 MG: 600 TABLET, FILM COATED ORAL at 06:14

## 2021-01-20 RX ADMIN — INSULIN ASPART 15 UNITS: 100 INJECTION, SOLUTION INTRAVENOUS; SUBCUTANEOUS at 08:49

## 2021-01-20 RX ADMIN — SODIUM CHLORIDE, PRESERVATIVE FREE 10 ML: 5 INJECTION INTRAVENOUS at 08:48

## 2021-01-20 RX ADMIN — DILTIAZEM HYDROCHLORIDE 120 MG: 120 CAPSULE, COATED, EXTENDED RELEASE ORAL at 08:48

## 2021-01-20 RX ADMIN — METHOCARBAMOL 500 MG: 500 TABLET, FILM COATED ORAL at 08:48

## 2021-01-20 RX ADMIN — RANOLAZINE 1000 MG: 500 TABLET, FILM COATED, EXTENDED RELEASE ORAL at 08:48

## 2021-01-20 RX ADMIN — LIDOCAINE 1 PATCH: 50 PATCH CUTANEOUS at 08:49

## 2021-01-20 RX ADMIN — IBUPROFEN 600 MG: 600 TABLET, FILM COATED ORAL at 11:22

## 2021-01-20 RX ADMIN — METHOCARBAMOL 500 MG: 500 TABLET, FILM COATED ORAL at 11:22

## 2021-01-20 NOTE — DISCHARGE SUMMARY
[facility] Medicine Services  DISCHARGE SUMMARY       Date of Admission: 1/18/2021  Date of Discharge:  1/20/2021  Primary Care Physician: Mami Perez APRN    Presenting Problem/History of Present Illness:  Hyperglycemia [R73.9]  Chest pain, unspecified type [R07.9]  Sepsis, due to unspecified organism, unspecified whether acute organ dysfunction present (CMS/[facility]) [A41.9]  COVID-19 virus infection [U07.1]       Final Discharge Diagnoses:  Active Hospital Problems    Diagnosis   • COVID-19 virus infection   • Morbid obesity (CMS/[facility])   • Chest pain, rule out acute myocardial infarction   • Sepsis, unspecified organism (CMS/[facility])   • Type 2 diabetes mellitus without complication, with long-term current use of insulin (CMS/[facility])       Consults:   Consults     No orders found from 12/20/2020 to 1/19/2021.          Procedures Performed:                 Pertinent Test Results:   Lab Results (last 24 hours)     Procedure Component Value Units Date/Time    POC Glucose Once [[id number]]  (Abnormal) Collected: 01/20/21 1034    Specimen: Blood Updated: 01/20/21 1124     Glucose 231 mg/dL      Comment: RN NotifiedOperator: [id number] FIDEL NATHANMeter ID: [id number]       POC Glucose Once [[id number]]  (Abnormal) Collected: 01/20/21 0719    Specimen: Blood Updated: 01/20/21 0743     Glucose 227 mg/dL      Comment: RN NotifiedOperator: [id number] FIDEL NATHANMeter ID: [id number]       Scan Slide [[id number]]  (Normal) Collected: 01/20/21 0546    Specimen: Blood Updated: 01/20/21 0658     RBC Morphology Normal     WBC Morphology Normal     Platelet Morphology Normal    Basic Metabolic Panel [[id number]]  (Abnormal) Collected: 01/20/21 0546    Specimen: Blood Updated: 01/20/21 0656     Glucose 261 mg/dL      BUN 17 mg/dL      Creatinine 0.67 mg/dL      Sodium 133 mmol/L      Potassium 4.2 mmol/L      Comment: Slight hemolysis detected by analyzer. Results may be affected.         Chloride 99 mmol/L      CO2 25.0 mmol/L      Calcium 9.1 mg/dL      eGFR Non African Amer 130 mL/min/1.73      BUN/Creatinine Ratio 25.4     Anion Gap 9.0 mmol/L     Narrative:      GFR Normal >60  Chronic Kidney Disease <60  Kidney Failure <15      CBC Auto Differential [[id number]]  (Abnormal) Collected: 01/20/21 0546    Specimen: Blood Updated: 01/20/21 0629     WBC 8.23 10*3/mm3      RBC 4.58 10*6/mm3      Hemoglobin 13.0 g/dL      Hematocrit 37.6 %      MCV 82.1 fL      MCH 28.4 pg      MCHC 34.6 g/dL      RDW 13.7 %      RDW-SD 40.3 fl      MPV 9.3 fL      Platelets 150 10*3/mm3      Neutrophil % 51.9 %      Lymphocyte % 32.0 %      Monocyte % 9.4 %      Eosinophil % 2.9 %      Basophil % 0.9 %      Immature Grans % 2.9 %      Neutrophils, Absolute 4.28 10*3/mm3      Lymphocytes, Absolute 2.63 10*3/mm3      Monocytes, Absolute 0.77 10*3/mm3      Eosinophils, Absolute 0.24 10*3/mm3      Basophils, Absolute 0.07 10*3/mm3      Immature Grans, Absolute 0.24 10*3/mm3      nRBC 0.0 /100 WBC     Blood Culture - Blood, Arm, Right [[id number]] Collected: 01/19/21 0239    Specimen: Blood from Arm, Right Updated: 01/20/21 0246     Blood Culture No growth at 24 hours    Blood Culture - Blood, Hand, Right [[id number]] Collected: 01/19/21 0239    Specimen: Blood from Hand, Right Updated: 01/20/21 0246     Blood Culture No growth at 24 hours    POC Glucose Once [[id number]]  (Abnormal) Collected: 01/19/21 1925    Specimen: Blood Updated: 01/19/21 1952     Glucose 278 mg/dL      Comment: RN NotifiedOperator: [id number] KHOI OPALMeter ID: [id number]       POC Glucose Once [[id number]]  (Abnormal) Collected: 01/19/21 1711    Specimen: Blood Updated: 01/19/21 1725     Glucose 268 mg/dL      Comment: RN NotifiedOperator: [id number] FIDEL NATHANMeter ID: [id number]           Imaging Results (All)     Procedure Component Value Units Date/Time    XR Chest 1 View [[id number]] Collected: 01/19/21 0129     Updated: 01/19/21 0153     Narrative:      EXAM DESCRIPTION:     XR CHEST 1 VW    CLINICAL HISTORY:     chest pain, cough     COMPARISON:     1/11/2021    TECHNIQUE:    Single AP view of the chest    FINDINGS:     Cardiac silhouette is within normal limits. There is no focal  parenchymal or pleural disease. There is no acute osseous process  visualized. Old left-sided rib fractures      Impression:        No evidence of acute cardiopulmonary disease.      Electronically signed by:  Kenneth Brush MD  1/19/2021 1:57 AM CST  Workstation: [id number]    CT Head Without Contrast [[id number]] Collected: 01/19/21 0124     Updated: 01/19/21 0157    Narrative:      CRANIAL CT SCAN WITHOUT CONTRAST    CLINICAL HISTORY: headache    COMPARISON: None that are available at the time of  interpretation. .    TECHNIQUE: Radiation dose reduction techniques were utilized,  including automated exposure control and exposure modulation  based on body size. Multiple axial images of the head were  obtained without contrast.     FINDINGS:  There are no abnormal areas of increased density or  mass effect. Ventricles, sulci, and cisterns appear normal. Bone  window images are unremarkable.        Impression:      1. No acute intracranial abnormality.            Electronically signed by:  Mikey Grace MD  1/19/2021 1:56 AM  CST Workstation: [id number]            Chief Complaint on Day of Discharge: none    Hospital Course:  40 year old  male with past medical history of CAD s/p stent, type 2 DM, ADOLFO, atrial fibrillation, chronic pulmonary emboli, COPD, morbid obesity with BMI of 59.97 who presented on 1/18/2021 with complaints of chest pain.  He was placed under observation for monitoring and had negative cardiac enzymes and no significant readings on EKG or telemetry.  CXR was negative for pulmonary process. Patient's pain was located in right chest wall and likely pleuritic in nature related to recent covid infection and the fact that pain was reproduced with  "deep breathing.  Patient reports some relief with use of NSAIDS and Lidoderm patch.  He is also instructed on use of heat upon discharge.  He has been counseled on caution with NSAID use in conjunction with his blood thinning medications.  He will be discharged home in stable condition with instructions for one week PCP follow up.    Condition on Discharge:  Stable     Physical Exam on Discharge:  /62 (BP Location: Right arm, Patient Position: Sitting)   Pulse 65   Temp 96.1 °F (35.6 °C) (Oral)   Resp 18   Ht 180.3 cm (71\")   Wt (!) 205 kg (451 lb 4.8 oz)   SpO2 94%   BMI 62.94 kg/m²   Physical Exam  Vitals signs and nursing note reviewed.   Constitutional:       General: He is not in acute distress.     Appearance: Normal appearance. He is obese. He is not ill-appearing.   HENT:      Head: Normocephalic and atraumatic.      Right Ear: External ear normal.      Left Ear: External ear normal.      Nose: Nose normal.      Mouth/Throat:      Mouth: Mucous membranes are moist.      Pharynx: Oropharynx is clear.   Eyes:      General: No scleral icterus.        Right eye: No discharge.         Left eye: No discharge.      Conjunctiva/sclera: Conjunctivae normal.   Neck:      Musculoskeletal: Normal range of motion and neck supple.   Cardiovascular:      Rate and Rhythm: Normal rate and regular rhythm.      Pulses: Normal pulses.      Heart sounds: Normal heart sounds. No murmur. No friction rub. No gallop.    Pulmonary:      Effort: Pulmonary effort is normal. No respiratory distress.      Breath sounds: Normal breath sounds. No stridor. No wheezing, rhonchi or rales.   Abdominal:      General: Bowel sounds are normal. There is no distension.      Palpations: Abdomen is soft.      Tenderness: There is no abdominal tenderness.   Musculoskeletal: Normal range of motion.         General: No swelling.   Skin:     General: Skin is warm and dry.   Neurological:      General: No focal deficit present.      Mental " Status: He is alert and oriented to person, place, and time.   Psychiatric:         Mood and Affect: Mood normal.         Behavior: Behavior normal.        Discharge Disposition:  Home or Self Care    Discharge Medications:     Discharge Medications      New Medications      Instructions Start Date   lidocaine 5 %  Commonly known as: LIDODERM   1 patch, Transdermal, Every 24 Hours Scheduled, Remove & Discard patch within 12 hours or as directed by MD   Start Date: January 21, 2021        Changes to Medications      Instructions Start Date   insulin lispro 100 UNIT/ML injection  Commonly known as: HumaLOG  What changed: how much to take   15 Units, Subcutaneous, 3 Times Daily Before Meals         Continue These Medications      Instructions Start Date   albuterol sulfate  (90 Base) MCG/ACT inhaler  Commonly known as: PROVENTIL HFA;VENTOLIN HFA;PROAIR HFA   2 puffs, Inhalation, Every 4 Hours PRN      atorvastatin 80 MG tablet  Commonly known as: LIPITOR   80 mg, Oral, Nightly      dilTIAZem  MG 24 hr capsule  Commonly known as: Cardizem CD   120 mg, Oral, Daily      fenofibrate micronized 134 MG capsule  Commonly known as: LOFIBRA   134 mg, Oral, Every Morning Before Breakfast      gabapentin 300 MG capsule  Commonly known as: NEURONTIN   600 mg, Oral, 2 Times Daily - RT      glucose blood test strip   Use as instructed      glucose monitor monitoring kit   1 each, Does not apply, As Needed      insulin glargine 100 UNIT/ML injection  Commonly known as: LANTUS, SEMGLEE   60 Units, Subcutaneous, Nightly      isosorbide mononitrate 120 MG 24 hr tablet  Commonly known as: IMDUR   120 mg, Oral, Every Morning      lisinopril 40 MG tablet  Commonly known as: PRINIVIL,ZESTRIL   40 mg, Oral, Nightly      methocarbamol 500 MG tablet  Commonly known as: Robaxin   500 mg, Oral, 4 Times Daily      metoprolol succinate XL 50 MG 24 hr tablet  Commonly known as: TOPROL-XL   50 mg, Oral, Daily      Microlet Lancets  "misc   One touch delica lancets      nitroglycerin 0.4 MG SL tablet  Commonly known as: NITROSTAT   0.4 mg, Sublingual, Every 5 Minutes PRN      pantoprazole 40 MG EC tablet  Commonly known as: PROTONIX   40 mg, Oral, Nightly      pramipexole 1 MG tablet  Commonly known as: MIRAPEX   2 mg, Oral, Nightly      prasugrel 10 MG tablet  Commonly known as: EFFIENT   10 mg, Oral, Daily      ranolazine 1000 MG 12 hr tablet  Commonly known as: RANEXA   1,000 mg, Oral, Every 12 Hours Scheduled      ReliOn Insulin Syringe 31G X 15/64\" 0.5 ML misc  Generic drug: Insulin Syringe-Needle U-100   For use with insulin      rivaroxaban 20 MG tablet  Commonly known as: XARELTO   20 mg, Oral, Daily With Dinner      SITagliptin 100 MG tablet  Commonly known as: Januvia   100 mg, Oral, Daily      traZODone 300 MG tablet  Commonly known as: DESYREL   300 mg, Oral, Every Night at Bedtime             Discharge Diet:   Diet Instructions     Diet: Consistent Carbohydrate, Cardiac; Thin      Discharge Diet:  Consistent Carbohydrate  Cardiac       Fluid Consistency: Thin          Activity at Discharge:   Activity Instructions     Activity as Tolerated            Discharge Care Plan/Instructions: Follow up with PCP within one week.     Follow-up Appointments:   Additional Instructions for the Follow-ups that You Need to Schedule     Discharge Follow-up with PCP   As directed       Currently Documented PCP:    Mami Perez APRN    PCP Phone Number:    [phone]     Follow Up Details: one week           Follow-up Information     Mami Perez APRN .    Specialties: Family Medicine, Nurse Practitioner  Why: one week  Contact information:  [address] [place] [id number] [phone]                   Test Results Pending at Discharge:   Pending Labs     Order Current Status    Blood Culture - Blood, Arm, Right Preliminary result    Blood Culture - Blood, Hand, Right Preliminary result                This document " has been electronically signed by LALA Wick on January 20, 2021 12:36 CST        Time: 30 minutes spent on assessment, discussion, management, and discharge planning for this patient.

## 2021-01-20 NOTE — OUTREACH NOTE
Prep Survey      Responses   [facility] patient discharged from?  [place]   Is LACE score < 7 ?  No   Emergency Room discharge w/ pulse ox?  No   Eligibility  [facility]   Date of Admission  01/18/21   Date of Discharge  01/20/21   Discharge Disposition  Home or Self Care   Discharge diagnosis  COVID-19 virus infection,    Chest pain   Does the patient have one of the following disease processes/diagnoses(primary or secondary)?  COVID-19   Does the patient have Home health ordered?  No   Is there a DME ordered?  No   Prep survey completed?  Yes          Bety Akhtar RN

## 2021-01-21 ENCOUNTER — TRANSITIONAL CARE MANAGEMENT TELEPHONE ENCOUNTER (OUTPATIENT)
Dept: CALL CENTER | Facility: HOSPITAL | Age: 41
End: 2021-01-21

## 2021-01-21 NOTE — OUTREACH NOTE
Call Center TCM Note      Responses   [facility] patient discharged from?  [place]   Does the patient have one of the following disease processes/diagnoses(primary or secondary)?  COVID-19   COVID-19 underlying condition?  None   TCM attempt successful?  No   Unsuccessful attempts  Attempt 2   Discharge diagnosis  COVID-19 virus infection,    Chest pain          Chapincito Snider RN    1/21/2021, 17:17 EST

## 2021-01-21 NOTE — OUTREACH NOTE
Call Center TCM Note      Responses   [facility] patient discharged from?  [place]   Does the patient have one of the following disease processes/diagnoses(primary or secondary)?  COVID-19   COVID-19 underlying condition?  None   TCM attempt successful?  No   Unsuccessful attempts  Attempt 1   Discharge diagnosis  COVID-19 virus infection,    Chest pain          Chapincito Snider RN    1/21/2021, 16:55 EST

## 2021-01-22 ENCOUNTER — TRANSITIONAL CARE MANAGEMENT TELEPHONE ENCOUNTER (OUTPATIENT)
Dept: CALL CENTER | Facility: HOSPITAL | Age: 41
End: 2021-01-22

## 2021-01-22 NOTE — OUTREACH NOTE
Call Center TCM Note      Responses   [facility] patient discharged from?  [place]   Does the patient have one of the following disease processes/diagnoses(primary or secondary)?  COVID-19   COVID-19 underlying condition?  None   TCM attempt successful?  Yes   Call start time  1109   Call end time  1115   Meds reviewed with patient/caregiver?  Yes   Is the patient having any side effects they believe may be caused by any medication additions or changes?  No   Does the patient have all medications ordered at discharge?  Yes   Is the patient taking all medications as directed (includes completed medication regime)?  Yes   Comments regarding appointments  Sees cardiology in office on 2/11/2021   Does the patient have a primary care provider?   Yes   Comments regarding PCP  PCP FOLLOW UP APPOINTMENT  is telephone visit 1/27/2021, Patient chooses telephone instead of video. This will not fulfil TCM requirement.    Does the patient have an appointment with their PCP or specialist within 7 days of discharge?  Yes   Has the patient kept scheduled appointments due by today?  N/A   Has home health visited the patient within 72 hours of discharge?  N/A   Psychosocial issues?  No   Comments  Patient reports he is doing good.    Did the patient receive a copy of their discharge instructions?  Yes   Did the patient receive a copy of COVID-19 specific instructions?  Yes   Nursing interventions  Reviewed instructions with patient   What is the patient's perception of their health status since discharge?  Improving   Does the patient have any of the following symptoms?  Loss of taste/smell   Nursing Interventions  Nurse provided patient education   Pulse Ox monitoring  Intermittent   Pulse Ox device source  Patient   O2 Sat comments  96-97% on RA   O2 Sat: education provided  Sat levels, Monitoring frequency, When to seek care   O2 Sat education comments  instructed pt to watch O2 sats closely and consider gojng to ED if  falls below 90% and does not come back up quickly along with SOB   Is the patient/caregiver able to teach back steps to recovery at home?  Set small, achievable goals for return to baseline health   If the patient is a current smoker, are they able to teach back resources for cessation?  Smoking cessation support groups   Is the patient/caregiver able to teach back the hierarchy of who to call/visit for symptoms/problems? PCP, Specialist, Home health nurse, Urgent Care, ED, 911  Yes   TCM call completed?  Yes   Wrap up additional comments  Patient reports he is doing well.           Chapincito Snider RN    1/22/2021, 11:15 EST

## 2021-01-23 ENCOUNTER — READMISSION MANAGEMENT (OUTPATIENT)
Dept: CALL CENTER | Facility: HOSPITAL | Age: 41
End: 2021-01-23

## 2021-01-24 ENCOUNTER — READMISSION MANAGEMENT (OUTPATIENT)
Dept: CALL CENTER | Facility: HOSPITAL | Age: 41
End: 2021-01-24

## 2021-01-24 LAB
BACTERIA SPEC AEROBE CULT: NORMAL
BACTERIA SPEC AEROBE CULT: NORMAL

## 2021-01-24 NOTE — OUTREACH NOTE
COVID-19 Week 1 Survey      Responses   [facility] patient discharged from?  [place]   Does the patient have one of the following disease processes/diagnoses(primary or secondary)?  COVID-19   COVID-19 underlying condition?  None   Call Number  Call 3   Week 1 Call successful?  No   Discharge diagnosis  COVID-19 virus infection,    Chest pain          Abbey Dugan RN

## 2021-01-29 ENCOUNTER — READMISSION MANAGEMENT (OUTPATIENT)
Dept: CALL CENTER | Facility: HOSPITAL | Age: 41
End: 2021-01-29

## 2021-01-29 NOTE — OUTREACH NOTE
COVID-19 Week 2 Survey      Responses   [facility] patient discharged from?  [place]   Does the patient have one of the following disease processes/diagnoses(primary or secondary)?  COVID-19   COVID-19 underlying condition?  None   Call Number  Call 2   COVID-19 Week 2: Call 1 attempt successful?  No   Discharge diagnosis  COVID-19 virus infection,    Chest pain          Merlene Dias RN

## 2021-02-02 ENCOUNTER — OFFICE VISIT (OUTPATIENT)
Dept: FAMILY MEDICINE CLINIC | Facility: CLINIC | Age: 41
End: 2021-02-02

## 2021-02-02 DIAGNOSIS — Z09 HOSPITAL DISCHARGE FOLLOW-UP: Primary | ICD-10-CM

## 2021-02-02 DIAGNOSIS — Z79.4 TYPE 2 DIABETES MELLITUS WITH DIABETIC NEUROPATHY, WITH LONG-TERM CURRENT USE OF INSULIN (HCC): ICD-10-CM

## 2021-02-02 DIAGNOSIS — M62.838 MUSCLE SPASM: ICD-10-CM

## 2021-02-02 DIAGNOSIS — E11.40 TYPE 2 DIABETES MELLITUS WITH DIABETIC NEUROPATHY, WITH LONG-TERM CURRENT USE OF INSULIN (HCC): ICD-10-CM

## 2021-02-02 PROCEDURE — 99442 PR PHYS/QHP TELEPHONE EVALUATION 11-20 MIN: CPT | Performed by: NURSE PRACTITIONER

## 2021-02-02 RX ORDER — METHOCARBAMOL 500 MG/1
500 TABLET, FILM COATED ORAL 4 TIMES DAILY
Qty: 56 TABLET | Refills: 3 | Status: SHIPPED | OUTPATIENT
Start: 2021-02-02 | End: 2022-04-12

## 2021-02-02 RX ORDER — GABAPENTIN 300 MG/1
600 CAPSULE ORAL
Qty: 120 CAPSULE | Refills: 0 | Status: SHIPPED | OUTPATIENT
Start: 2021-02-02 | End: 2021-03-09 | Stop reason: SDUPTHER

## 2021-02-02 RX ORDER — RANOLAZINE 1000 MG/1
1000 TABLET, EXTENDED RELEASE ORAL EVERY 12 HOURS SCHEDULED
Qty: 60 TABLET | Refills: 5 | Status: SHIPPED | OUTPATIENT
Start: 2021-02-02 | End: 2021-09-30 | Stop reason: SDUPTHER

## 2021-02-02 NOTE — PROGRESS NOTES
CC: Follow-up      Subjective:  Yordy Hansen is a 40 y.o. male who presents for     Telephone visit because of pandemic. This is a hospital follow up. Pt was admitted to [facility] on 1/11/2021-1/13/2021 with SOB, Tachycardia, COVID-19, and Atypical Chest pain. The chest pain was with cough and deep inspiration. Serial Troponins and EKG negative. Pt was doing better and sent home. Then, on 1/18/2021 was readmitted with Hyperglycemia, Chest pain, Sepsis and COVID-19. Again, negative cardiac enzymes and negative EKG and telemetry. Sepsis was ruled out. Pt had a negative CXR. Was discharged home on 1/20/21. Lidoderm patches helped with the chest pain. Pt has a follow up appointment with Cardiology on 2/11/2021. Pt states he was given IV antibiotics while in the hospital and has been doing good since discharge from the hospital. Denies SOB or chest pain currently. Pt states he needs refills on Ranexa, Robaxin, and Gabapentin.      The following portions of the patient's history were reviewed and updated as appropriate: allergies, current medications, past family history, past medical history, past social history, past surgical history and problem list.    Past Medical History:   Diagnosis Date   • CAD (coronary artery disease)    • Chronic back pain    • Chronic pulmonary embolism (CMS/HCC)    • Coronary artery disease    • Diabetes mellitus (CMS/HCC)    • Factor II deficiency (CMS/HCC)    • Fatty liver    • GERD (gastroesophageal reflux disease)    • Hyperlipidemia    • Hypertension    • Morbid obesity (CMS/HCC)    • RLS (restless legs syndrome)    • Seizures (CMS/HCC)    • Sleep apnea          Current Outpatient Medications:   •  albuterol (PROVENTIL HFA;VENTOLIN HFA) 108 (90 BASE) MCG/ACT inhaler, Inhale 2 puffs Every 4 (Four) Hours As Needed for Wheezing., Disp: , Rfl:   •  atorvastatin (LIPITOR) 80 MG tablet, Take 1 tablet by mouth Every Night., Disp: 90 tablet, Rfl: 3  •  dilTIAZem CD  (Cardizem CD) 120 MG 24 hr capsule, Take 1 capsule by mouth Daily., Disp: 90 capsule, Rfl: 0  •  fenofibrate micronized (LOFIBRA) 134 MG capsule, Take 1 capsule by mouth Every Morning Before Breakfast., Disp: 90 capsule, Rfl: 3  •  gabapentin (NEURONTIN) 300 MG capsule, Take 2 capsules by mouth 2 (Two) Times a Day., Disp: 120 capsule, Rfl: 0  •  glucose blood test strip, Use as instructed, Disp: 100 each, Rfl: 12  •  glucose monitor monitoring kit, 1 each As Needed (check blood glucose 3x daily)., Disp: 1 each, Rfl: 3  •  insulin glargine (LANTUS) 100 UNIT/ML injection, Inject 60 Units under the skin into the appropriate area as directed Every Night., Disp: 50 mL, Rfl: 3  •  insulin lispro (HumaLOG) 100 UNIT/ML injection, Inject 15 Units under the skin into the appropriate area as directed 3 (Three) Times a Day Before Meals. (Patient taking differently: Inject 25 Units under the skin into the appropriate area as directed 3 (Three) Times a Day Before Meals.), Disp: 30 mL, Rfl: 3  •  isosorbide mononitrate (IMDUR) 120 MG 24 hr tablet, Take 1 tablet by mouth Every Morning for 30 days., Disp: 30 tablet, Rfl: 0  •  lisinopril (PRINIVIL,ZESTRIL) 40 MG tablet, Take 1 tablet by mouth Every Night., Disp: 90 tablet, Rfl: 3  •  methocarbamol (Robaxin) 500 MG tablet, Take 1 tablet by mouth 4 (Four) Times a Day., Disp: 56 tablet, Rfl: 3  •  metoprolol succinate XL (TOPROL-XL) 50 MG 24 hr tablet, Take 1 tablet by mouth Daily., Disp: 90 tablet, Rfl: 3  •  Microlet Lancets misc, One touch delica lancets, Disp: 100 each, Rfl: 5  •  nitroglycerin (NITROSTAT) 0.4 MG SL tablet, Place 1 tablet under the tongue Every 5 (Five) Minutes As Needed for Chest Pain for up to 15 days., Disp: 25 tablet, Rfl: 6  •  pantoprazole (PROTONIX) 40 MG EC tablet, Take 1 tablet by mouth Every Night., Disp: 90 tablet, Rfl: 3  •  pramipexole (MIRAPEX) 1 MG tablet, Take 2 tablets by mouth Every Night., Disp: 180 tablet, Rfl: 3  •  prasugrel (EFFIENT) 10 MG  "tablet, Take 1 tablet by mouth Daily., Disp: 90 tablet, Rfl: 3  •  ranolazine (RANEXA) 1000 MG 12 hr tablet, Take 1 tablet by mouth Every 12 (Twelve) Hours., Disp: 60 tablet, Rfl: 5  •  ReliOn Insulin Syringe 31G X 15/64\" 0.5 ML misc, For use with insulin, Disp: 100 each, Rfl: 5  •  rivaroxaban (XARELTO) 20 MG tablet, Take 1 tablet by mouth Daily With Dinner., Disp: 90 tablet, Rfl: 3  •  SITagliptin (Januvia) 100 MG tablet, Take 1 tablet by mouth Daily., Disp: 30 tablet, Rfl: 5  •  traZODone (DESYREL) 300 MG tablet, Take 1 tablet by mouth every night at bedtime., Disp: 90 tablet, Rfl: 3    Review of Systems    Review of Systems   Constitutional: Negative.    HENT: Negative.    Eyes: Negative.    Respiratory: Negative.    Cardiovascular: Negative.    Gastrointestinal: Negative.    Endocrine: Negative.    Genitourinary: Negative.    Musculoskeletal: Negative.    Skin: Negative.    Allergic/Immunologic: Negative.    Neurological: Negative.    Hematological: Negative.    Psychiatric/Behavioral: Negative.    All other systems reviewed and are negative.      Objective  There were no vitals filed for this visit.  There is no height or weight on file to calculate BMI.    Physical Exam    Physical Exam  Deferred, telephone visit because of pandemic.      Diagnoses and all orders for this visit:    1. Hospital discharge follow-up (Primary)    2. Type 2 diabetes mellitus with diabetic neuropathy, with long-term current use of insulin (CMS/[facility])  -     gabapentin (NEURONTIN) 300 MG capsule; Take 2 capsules by mouth 2 (Two) Times a Day.  Dispense: 120 capsule; Refill: 0    3. Muscle spasm  -     methocarbamol (Robaxin) 500 MG tablet; Take 1 tablet by mouth 4 (Four) Times a Day.  Dispense: 56 tablet; Refill: 3    Other orders  -     ranolazine (RANEXA) 1000 MG 12 hr tablet; Take 1 tablet by mouth Every 12 (Twelve) Hours.  Dispense: 60 tablet; Refill: 5       Patient doing well since hospital discharge.  Patient to continue his " current medications.  Refilled Neurontin 300 mg 2 capsules by mouth 2 times a day.  Also refilled Robaxin 500 mg 1 p.o. 4 times a day.  And refilled Ranexa 1000 mg 1 p.o. twice daily.  Answered all questions.  Patient verbalized understanding of plan of care.  We will follow-up in 6 months or sooner if needed.  Patient instructed to keep his appointments with cardiology on the 11th and to keep scheduled appointment with endocrinology in April.      This document has been electronically signed by LALA Munoz on February 2, 2021 09:49 CST     You have chosen to receive care through a telephone visit. Do you consent to use a telephone visit for your medical care today? Yes  This visit has been rescheduled as a phone visit to comply with patient safety concerns in accordance with CDC recommendations. Total time of discussion was 13 minutes.

## 2021-02-02 NOTE — PATIENT INSTRUCTIONS
Chest Wall Pain  Chest wall pain is pain in or around the bones and muscles of your chest. Chest wall pain may be caused by:  · An injury.  · Coughing a lot.  · Using your chest and arm muscles too much.  Sometimes, the cause may not be known. This pain may take a few weeks or longer to get better.  Follow these instructions at home:  Managing pain, stiffness, and swelling  If told, put ice on the painful area:  · Put ice in a plastic bag.  · Place a towel between your skin and the bag.  · Leave the ice on for 20 minutes, 2-3 times a day.    Activity  · Rest as told by your doctor.  · Avoid doing things that cause pain. This includes lifting heavy items.  · Ask your doctor what activities are safe for you.  General instructions    · Take over-the-counter and prescription medicines only as told by your doctor.  · Do not use any products that contain nicotine or tobacco, such as cigarettes, e-cigarettes, and chewing tobacco. If you need help quitting, ask your doctor.  · Keep all follow-up visits as told by your doctor. This is important.  Contact a doctor if:  · You have a fever.  · Your chest pain gets worse.  · You have new symptoms.  Get help right away if:  · You feel sick to your stomach (nauseous) or you throw up (vomit).  · You feel sweaty or light-headed.  · You have a cough with mucus from your lungs (sputum) or you cough up blood.  · You are short of breath.  These symptoms may be an emergency. Do not wait to see if the symptoms will go away. Get medical help right away. Call your local emergency services (911 in the [place] Do not drive yourself to the hospital.  Summary  · Chest wall pain is pain in or around the bones and muscles of your chest.  · It may be treated with ice, rest, and medicines. Your condition may also get better if you avoid doing things that cause pain.  · Contact a doctor if you have a fever, chest pain that gets worse, or new symptoms.  · Get help right away if you feel light-headed  or you get short of breath. These symptoms may be an emergency.  This information is not intended to replace advice given to you by your health care provider. Make sure you discuss any questions you have with your health care provider.  Document Revised: 06/20/2019 Document Reviewed: 06/20/2019  Elsevier Patient Education © 2020 [employer]

## 2021-02-05 ENCOUNTER — READMISSION MANAGEMENT (OUTPATIENT)
Dept: CALL CENTER | Facility: HOSPITAL | Age: 41
End: 2021-02-05

## 2021-02-05 NOTE — OUTREACH NOTE
COVID-19 Week 3 Survey      Responses   [facility] patient discharged from?  [place]   Does the patient have one of the following disease processes/diagnoses(primary or secondary)?  COVID-19   COVID-19 underlying condition?  None   Call Number  Call 1   COVID-19 Week 3: Call 1 attempt successful?  Yes   Call start time  1026   Call end time  1032   Discharge diagnosis  COVID-19 virus infection,    Chest pain   Meds reviewed with patient/caregiver?  Yes   Is the patient taking all medications as directed (includes completed medication regime)?  Yes   Has the patient kept scheduled appointments due by today?  Yes   Comments  Reviewed locations and dates/times for appts for pt   Psychosocial issues?  No   What is the patient's perception of their health status since discharge?  Improving   Does the patient have any of the following symptoms?  None   Nursing Interventions  Nurse provided patient education   Pulse Ox monitoring  Intermittent   Pulse Ox device source  Patient   O2 Sat comments  97% on RA   Is the patient/caregiver able to teach back the hierarchy of who to call/visit for symptoms/problems? PCP, Specialist, Home health nurse, Urgent Care, ED, 911  Yes   COVID-19 call completed?  Yes   Wrap up additional comments  Pt doing well.          April Navarrete, RN

## 2021-02-07 LAB
QT INTERVAL: 400 MS
QTC INTERVAL: 467 MS

## 2021-02-08 LAB
QT INTERVAL: 380 MS
QTC INTERVAL: 449 MS

## 2021-02-12 ENCOUNTER — READMISSION MANAGEMENT (OUTPATIENT)
Dept: CALL CENTER | Facility: HOSPITAL | Age: 41
End: 2021-02-12

## 2021-02-12 NOTE — OUTREACH NOTE
COVID-19 Week 4 Survey      Responses   [facility] patient discharged from?  [place]   Does the patient have one of the following disease processes/diagnoses(primary or secondary)?  COVID-19   COVID-19 underlying condition?  None   Call Number  Call 1   COVID-19 Week 4: Call 1 attempt successful?  No   Discharge diagnosis  COVID-19 virus infection,    Chest pain          Radha Manjarrez RN

## 2021-03-09 DIAGNOSIS — Z79.4 TYPE 2 DIABETES MELLITUS WITH DIABETIC NEUROPATHY, WITH LONG-TERM CURRENT USE OF INSULIN (HCC): ICD-10-CM

## 2021-03-09 DIAGNOSIS — E11.40 TYPE 2 DIABETES MELLITUS WITH DIABETIC NEUROPATHY, WITH LONG-TERM CURRENT USE OF INSULIN (HCC): ICD-10-CM

## 2021-03-09 RX ORDER — GABAPENTIN 300 MG/1
600 CAPSULE ORAL
Qty: 120 CAPSULE | Refills: 0 | Status: SHIPPED | OUTPATIENT
Start: 2021-03-09 | End: 2021-04-06 | Stop reason: SDUPTHER

## 2021-03-31 ENCOUNTER — APPOINTMENT (OUTPATIENT)
Dept: CT IMAGING | Facility: HOSPITAL | Age: 41
End: 2021-03-31

## 2021-03-31 ENCOUNTER — HOSPITAL ENCOUNTER (OUTPATIENT)
Facility: HOSPITAL | Age: 41
Setting detail: OBSERVATION
Discharge: HOME | End: 2021-04-02
Attending: EMERGENCY MEDICINE | Admitting: STUDENT IN AN ORGANIZED HEALTH CARE EDUCATION/TRAINING PROGRAM

## 2021-03-31 ENCOUNTER — APPOINTMENT (OUTPATIENT)
Dept: GENERAL RADIOLOGY | Facility: HOSPITAL | Age: 41
End: 2021-03-31

## 2021-03-31 DIAGNOSIS — Z99.89 OSA ON CPAP: ICD-10-CM

## 2021-03-31 DIAGNOSIS — R07.82 INTERCOSTAL PAIN: ICD-10-CM

## 2021-03-31 DIAGNOSIS — I48.0 PAROXYSMAL ATRIAL FIBRILLATION (HCC): ICD-10-CM

## 2021-03-31 DIAGNOSIS — R07.9 CHEST PAIN, UNSPECIFIED TYPE: Primary | ICD-10-CM

## 2021-03-31 DIAGNOSIS — G47.33 OSA ON CPAP: ICD-10-CM

## 2021-03-31 PROCEDURE — 93010 ELECTROCARDIOGRAM REPORT: CPT | Performed by: INTERNAL MEDICINE

## 2021-03-31 PROCEDURE — 71045 X-RAY EXAM CHEST 1 VIEW: CPT

## 2021-03-31 PROCEDURE — 99285 EMERGENCY DEPT VISIT HI MDM: CPT

## 2021-03-31 PROCEDURE — 96375 TX/PRO/DX INJ NEW DRUG ADDON: CPT

## 2021-03-31 PROCEDURE — 70450 CT HEAD/BRAIN W/O DYE: CPT

## 2021-03-31 PROCEDURE — 93005 ELECTROCARDIOGRAM TRACING: CPT | Performed by: EMERGENCY MEDICINE

## 2021-03-31 RX ORDER — NITROGLYCERIN 0.4 MG/1
0.4 TABLET SUBLINGUAL
Status: DISCONTINUED | OUTPATIENT
Start: 2021-03-31 | End: 2021-04-02 | Stop reason: HOSPADM

## 2021-03-31 RX ORDER — ASPIRIN 81 MG/1
81 TABLET, CHEWABLE ORAL ONCE
Status: COMPLETED | OUTPATIENT
Start: 2021-03-31 | End: 2021-04-01

## 2021-03-31 RX ORDER — SODIUM CHLORIDE 0.9 % (FLUSH) 0.9 %
10 SYRINGE (ML) INJECTION AS NEEDED
Status: DISCONTINUED | OUTPATIENT
Start: 2021-03-31 | End: 2021-04-02 | Stop reason: HOSPADM

## 2021-04-01 ENCOUNTER — APPOINTMENT (OUTPATIENT)
Dept: CARDIOLOGY | Facility: HOSPITAL | Age: 41
End: 2021-04-01

## 2021-04-01 ENCOUNTER — APPOINTMENT (OUTPATIENT)
Dept: CT IMAGING | Facility: HOSPITAL | Age: 41
End: 2021-04-01

## 2021-04-01 LAB
ALBUMIN SERPL-MCNC: 3.3 G/DL (ref 3.5–5.2)
ALBUMIN/GLOB SERPL: 0.9 G/DL
ALP SERPL-CCNC: 80 U/L (ref 39–117)
ALT SERPL W P-5'-P-CCNC: 27 U/L (ref 1–41)
ANION GAP SERPL CALCULATED.3IONS-SCNC: 12 MMOL/L (ref 5–15)
AST SERPL-CCNC: 26 U/L (ref 1–40)
BASOPHILS # BLD AUTO: 0.05 10*3/MM3 (ref 0–0.2)
BASOPHILS NFR BLD AUTO: 0.7 % (ref 0–1.5)
BH CV ECHO MEAS - ACS: 2.7 CM
BH CV ECHO MEAS - AO MAX PG (FULL): 3.9 MMHG
BH CV ECHO MEAS - AO MAX PG: 14.3 MMHG
BH CV ECHO MEAS - AO MEAN PG (FULL): 2 MMHG
BH CV ECHO MEAS - AO MEAN PG: 9 MMHG
BH CV ECHO MEAS - AO ROOT AREA (BSA CORRECTED): 1.3
BH CV ECHO MEAS - AO ROOT AREA: 11.3 CM^2
BH CV ECHO MEAS - AO ROOT DIAM: 3.8 CM
BH CV ECHO MEAS - AO V2 MAX: 189 CM/SEC
BH CV ECHO MEAS - AO V2 MEAN: 148 CM/SEC
BH CV ECHO MEAS - AO V2 VTI: 41.5 CM
BH CV ECHO MEAS - ASC AORTA: 3.2 CM
BH CV ECHO MEAS - AVA(I,A): 3.2 CM^2
BH CV ECHO MEAS - AVA(I,D): 3.2 CM^2
BH CV ECHO MEAS - AVA(V,A): 3.2 CM^2
BH CV ECHO MEAS - AVA(V,D): 3.2 CM^2
BH CV ECHO MEAS - BSA(HAYCOCK): 3.3 M^2
BH CV ECHO MEAS - BSA: 3 M^2
BH CV ECHO MEAS - BZI_BMI: 62.1 KILOGRAMS/M^2
BH CV ECHO MEAS - BZI_METRIC_HEIGHT: 180.3 CM
BH CV ECHO MEAS - BZI_METRIC_WEIGHT: 201.9 KG
BH CV ECHO MEAS - EDV(CUBED): 207.5 ML
BH CV ECHO MEAS - EDV(MOD-SP2): 121 ML
BH CV ECHO MEAS - EDV(MOD-SP4): 157 ML
BH CV ECHO MEAS - EDV(TEICH): 174.6 ML
BH CV ECHO MEAS - EF(CUBED): 62.5 %
BH CV ECHO MEAS - EF(MOD-SP2): 58.4 %
BH CV ECHO MEAS - EF(MOD-SP4): 52.5 %
BH CV ECHO MEAS - EF(TEICH): 53.2 %
BH CV ECHO MEAS - ESV(CUBED): 77.9 ML
BH CV ECHO MEAS - ESV(MOD-SP2): 50.3 ML
BH CV ECHO MEAS - ESV(MOD-SP4): 74.6 ML
BH CV ECHO MEAS - ESV(TEICH): 81.7 ML
BH CV ECHO MEAS - FS: 27.9 %
BH CV ECHO MEAS - IVS/LVPW: 0.99
BH CV ECHO MEAS - IVSD: 1.1 CM
BH CV ECHO MEAS - LA DIMENSION: 4.1 CM
BH CV ECHO MEAS - LA/AO: 1.1
BH CV ECHO MEAS - LV DIASTOLIC VOL/BSA (35-75): 53 ML/M^2
BH CV ECHO MEAS - LV MASS(C)D: 258.8 GRAMS
BH CV ECHO MEAS - LV MASS(C)DI: 87.3 GRAMS/M^2
BH CV ECHO MEAS - LV MAX PG: 10.4 MMHG
BH CV ECHO MEAS - LV MEAN PG: 7 MMHG
BH CV ECHO MEAS - LV SYSTOLIC VOL/BSA (12-30): 25.2 ML/M^2
BH CV ECHO MEAS - LV V1 MAX: 161 CM/SEC
BH CV ECHO MEAS - LV V1 MEAN: 131 CM/SEC
BH CV ECHO MEAS - LV V1 VTI: 34.5 CM
BH CV ECHO MEAS - LVIDD: 5.9 CM
BH CV ECHO MEAS - LVIDS: 4.3 CM
BH CV ECHO MEAS - LVLD AP2: 8.9 CM
BH CV ECHO MEAS - LVLD AP4: 9.6 CM
BH CV ECHO MEAS - LVLS AP2: 7.5 CM
BH CV ECHO MEAS - LVLS AP4: 8.6 CM
BH CV ECHO MEAS - LVOT AREA (M): 3.8 CM^2
BH CV ECHO MEAS - LVOT AREA: 3.8 CM^2
BH CV ECHO MEAS - LVOT DIAM: 2.2 CM
BH CV ECHO MEAS - LVPWD: 1.1 CM
BH CV ECHO MEAS - MV A MAX VEL: 88.8 CM/SEC
BH CV ECHO MEAS - MV DEC SLOPE: 761 CM/SEC^2
BH CV ECHO MEAS - MV E MAX VEL: 125 CM/SEC
BH CV ECHO MEAS - MV E/A: 1.4
BH CV ECHO MEAS - MV MAX PG: 6 MMHG
BH CV ECHO MEAS - MV MEAN PG: 2 MMHG
BH CV ECHO MEAS - MV P1/2T MAX VEL: 122 CM/SEC
BH CV ECHO MEAS - MV P1/2T: 47 MSEC
BH CV ECHO MEAS - MV V2 MAX: 122 CM/SEC
BH CV ECHO MEAS - MV V2 MEAN: 67.9 CM/SEC
BH CV ECHO MEAS - MV V2 VTI: 33.7 CM
BH CV ECHO MEAS - MVA P1/2T LCG: 1.8 CM^2
BH CV ECHO MEAS - MVA(P1/2T): 4.7 CM^2
BH CV ECHO MEAS - MVA(VTI): 3.9 CM^2
BH CV ECHO MEAS - PA MAX PG: 5.7 MMHG
BH CV ECHO MEAS - PA V2 MAX: 119 CM/SEC
BH CV ECHO MEAS - RAP SYSTOLE: 5 MMHG
BH CV ECHO MEAS - RVDD: 3.4 CM
BH CV ECHO MEAS - RVSP: 27.3 MMHG
BH CV ECHO MEAS - SI(AO): 158.9 ML/M^2
BH CV ECHO MEAS - SI(CUBED): 43.8 ML/M^2
BH CV ECHO MEAS - SI(LVOT): 44.3 ML/M^2
BH CV ECHO MEAS - SI(MOD-SP2): 23.9 ML/M^2
BH CV ECHO MEAS - SI(MOD-SP4): 27.8 ML/M^2
BH CV ECHO MEAS - SI(TEICH): 31.3 ML/M^2
BH CV ECHO MEAS - SV(AO): 470.7 ML
BH CV ECHO MEAS - SV(CUBED): 129.6 ML
BH CV ECHO MEAS - SV(LVOT): 131.1 ML
BH CV ECHO MEAS - SV(MOD-SP2): 70.7 ML
BH CV ECHO MEAS - SV(MOD-SP4): 82.4 ML
BH CV ECHO MEAS - SV(TEICH): 92.9 ML
BH CV ECHO MEAS - TR MAX VEL: 236 CM/SEC
BILIRUB SERPL-MCNC: 0.2 MG/DL (ref 0–1.2)
BUN SERPL-MCNC: 7 MG/DL (ref 6–20)
BUN/CREAT SERPL: 10.4 (ref 7–25)
CALCIUM SPEC-SCNC: 9 MG/DL (ref 8.6–10.5)
CHLORIDE SERPL-SCNC: 101 MMOL/L (ref 98–107)
CO2 SERPL-SCNC: 25 MMOL/L (ref 22–29)
CREAT SERPL-MCNC: 0.67 MG/DL (ref 0.76–1.27)
D-DIMER, QUANTITATIVE (MAD,POW, STR): 615 NG/ML (FEU) (ref 0–470)
DEPRECATED RDW RBC AUTO: 41.5 FL (ref 37–54)
EOSINOPHIL # BLD AUTO: 0.23 10*3/MM3 (ref 0–0.4)
EOSINOPHIL NFR BLD AUTO: 3.1 % (ref 0.3–6.2)
ERYTHROCYTE [DISTWIDTH] IN BLOOD BY AUTOMATED COUNT: 13.8 % (ref 12.3–15.4)
FLUAV RNA RESP QL NAA+PROBE: NOT DETECTED
FLUBV RNA RESP QL NAA+PROBE: NOT DETECTED
GFR SERPL CREATININE-BSD FRML MDRD: 130 ML/MIN/1.73
GLOBULIN UR ELPH-MCNC: 3.8 GM/DL
GLUCOSE BLDC GLUCOMTR-MCNC: 248 MG/DL (ref 70–130)
GLUCOSE BLDC GLUCOMTR-MCNC: 255 MG/DL (ref 70–130)
GLUCOSE BLDC GLUCOMTR-MCNC: 265 MG/DL (ref 70–130)
GLUCOSE BLDC GLUCOMTR-MCNC: 314 MG/DL (ref 70–130)
GLUCOSE BLDC GLUCOMTR-MCNC: 345 MG/DL (ref 70–130)
GLUCOSE BLDC GLUCOMTR-MCNC: 345 MG/DL (ref 70–130)
GLUCOSE SERPL-MCNC: 386 MG/DL (ref 65–99)
HCT VFR BLD AUTO: 41 % (ref 37.5–51)
HGB BLD-MCNC: 13.9 G/DL (ref 13–17.7)
HOLD SPECIMEN: NORMAL
IMM GRANULOCYTES # BLD AUTO: 0.13 10*3/MM3 (ref 0–0.05)
IMM GRANULOCYTES NFR BLD AUTO: 1.7 % (ref 0–0.5)
LYMPHOCYTES # BLD AUTO: 1.83 10*3/MM3 (ref 0.7–3.1)
LYMPHOCYTES NFR BLD AUTO: 24.6 % (ref 19.6–45.3)
MAGNESIUM SERPL-MCNC: 1.8 MG/DL (ref 1.6–2.6)
MAXIMAL PREDICTED HEART RATE: 178 BPM
MCH RBC QN AUTO: 28 PG (ref 26.6–33)
MCHC RBC AUTO-ENTMCNC: 33.9 G/DL (ref 31.5–35.7)
MCV RBC AUTO: 82.5 FL (ref 79–97)
MONOCYTES # BLD AUTO: 0.62 10*3/MM3 (ref 0.1–0.9)
MONOCYTES NFR BLD AUTO: 8.3 % (ref 5–12)
NEUTROPHILS NFR BLD AUTO: 4.57 10*3/MM3 (ref 1.7–7)
NEUTROPHILS NFR BLD AUTO: 61.6 % (ref 42.7–76)
NRBC BLD AUTO-RTO: 0 /100 WBC (ref 0–0.2)
NT-PROBNP SERPL-MCNC: 45.3 PG/ML (ref 0–450)
PLATELET # BLD AUTO: 187 10*3/MM3 (ref 140–450)
PMV BLD AUTO: 9.1 FL (ref 6–12)
POTASSIUM SERPL-SCNC: 3.6 MMOL/L (ref 3.5–5.2)
PROT SERPL-MCNC: 7.1 G/DL (ref 6–8.5)
QT INTERVAL: 404 MS
QT INTERVAL: 422 MS
QTC INTERVAL: 445 MS
QTC INTERVAL: 477 MS
RBC # BLD AUTO: 4.97 10*6/MM3 (ref 4.14–5.8)
SARS-COV-2 RNA RESP QL NAA+PROBE: NOT DETECTED
SODIUM SERPL-SCNC: 138 MMOL/L (ref 136–145)
STRESS TARGET HR: 151 BPM
TROPONIN T SERPL-MCNC: <0.01 NG/ML (ref 0–0.03)
TROPONIN T SERPL-MCNC: <0.01 NG/ML (ref 0–0.03)
WBC # BLD AUTO: 7.43 10*3/MM3 (ref 3.4–10.8)
WHOLE BLOOD HOLD SPECIMEN: NORMAL
WHOLE BLOOD HOLD SPECIMEN: NORMAL

## 2021-04-01 PROCEDURE — C9803 HOPD COVID-19 SPEC COLLECT: HCPCS

## 2021-04-01 PROCEDURE — 84484 ASSAY OF TROPONIN QUANT: CPT | Performed by: EMERGENCY MEDICINE

## 2021-04-01 PROCEDURE — 71275 CT ANGIOGRAPHY CHEST: CPT

## 2021-04-01 PROCEDURE — G0378 HOSPITAL OBSERVATION PER HR: HCPCS

## 2021-04-01 PROCEDURE — 63710000001 INSULIN DETEMIR PER 5 UNITS: Performed by: INTERNAL MEDICINE

## 2021-04-01 PROCEDURE — 85025 COMPLETE CBC W/AUTO DIFF WBC: CPT | Performed by: EMERGENCY MEDICINE

## 2021-04-01 PROCEDURE — 94799 UNLISTED PULMONARY SVC/PX: CPT

## 2021-04-01 PROCEDURE — 25010000002 MORPHINE PER 10 MG: Performed by: INTERNAL MEDICINE

## 2021-04-01 PROCEDURE — 93306 TTE W/DOPPLER COMPLETE: CPT | Performed by: INTERNAL MEDICINE

## 2021-04-01 PROCEDURE — 99214 OFFICE O/P EST MOD 30 MIN: CPT | Performed by: INTERNAL MEDICINE

## 2021-04-01 PROCEDURE — 93005 ELECTROCARDIOGRAM TRACING: CPT | Performed by: INTERNAL MEDICINE

## 2021-04-01 PROCEDURE — 82962 GLUCOSE BLOOD TEST: CPT

## 2021-04-01 PROCEDURE — 96365 THER/PROPH/DIAG IV INF INIT: CPT

## 2021-04-01 PROCEDURE — 87636 SARSCOV2 & INF A&B AMP PRB: CPT | Performed by: EMERGENCY MEDICINE

## 2021-04-01 PROCEDURE — 25010000002 ONDANSETRON PER 1 MG: Performed by: EMERGENCY MEDICINE

## 2021-04-01 PROCEDURE — 63710000001 INSULIN REGULAR HUMAN PER 5 UNITS: Performed by: EMERGENCY MEDICINE

## 2021-04-01 PROCEDURE — 94760 N-INVAS EAR/PLS OXIMETRY 1: CPT

## 2021-04-01 PROCEDURE — 96375 TX/PRO/DX INJ NEW DRUG ADDON: CPT

## 2021-04-01 PROCEDURE — 80053 COMPREHEN METABOLIC PANEL: CPT | Performed by: EMERGENCY MEDICINE

## 2021-04-01 PROCEDURE — 96366 THER/PROPH/DIAG IV INF ADDON: CPT

## 2021-04-01 PROCEDURE — 94640 AIRWAY INHALATION TREATMENT: CPT

## 2021-04-01 PROCEDURE — 63710000001 INSULIN ASPART PER 5 UNITS: Performed by: INTERNAL MEDICINE

## 2021-04-01 PROCEDURE — 84484 ASSAY OF TROPONIN QUANT: CPT | Performed by: INTERNAL MEDICINE

## 2021-04-01 PROCEDURE — 25010000002 MAGNESIUM SULFATE IN D5W 1G/100ML (PREMIX) 1-5 GM/100ML-% SOLUTION: Performed by: INTERNAL MEDICINE

## 2021-04-01 PROCEDURE — 83735 ASSAY OF MAGNESIUM: CPT | Performed by: INTERNAL MEDICINE

## 2021-04-01 PROCEDURE — 93306 TTE W/DOPPLER COMPLETE: CPT

## 2021-04-01 PROCEDURE — 25010000002 MORPHINE PER 10 MG: Performed by: EMERGENCY MEDICINE

## 2021-04-01 PROCEDURE — 85379 FIBRIN DEGRADATION QUANT: CPT | Performed by: INTERNAL MEDICINE

## 2021-04-01 PROCEDURE — 83880 ASSAY OF NATRIURETIC PEPTIDE: CPT | Performed by: EMERGENCY MEDICINE

## 2021-04-01 PROCEDURE — 0 IOPAMIDOL PER 1 ML: Performed by: INTERNAL MEDICINE

## 2021-04-01 PROCEDURE — 96376 TX/PRO/DX INJ SAME DRUG ADON: CPT

## 2021-04-01 RX ORDER — NALOXONE HCL 0.4 MG/ML
0.4 VIAL (ML) INJECTION
Status: DISCONTINUED | OUTPATIENT
Start: 2021-04-01 | End: 2021-04-02 | Stop reason: HOSPADM

## 2021-04-01 RX ORDER — IPRATROPIUM BROMIDE AND ALBUTEROL SULFATE 2.5; .5 MG/3ML; MG/3ML
3 SOLUTION RESPIRATORY (INHALATION) EVERY 6 HOURS PRN
Status: DISCONTINUED | OUTPATIENT
Start: 2021-04-01 | End: 2021-04-01

## 2021-04-01 RX ORDER — PRASUGREL 10 MG/1
10 TABLET, FILM COATED ORAL DAILY
Status: DISCONTINUED | OUTPATIENT
Start: 2021-04-01 | End: 2021-04-02 | Stop reason: HOSPADM

## 2021-04-01 RX ORDER — NICOTINE POLACRILEX 4 MG
15 LOZENGE BUCCAL
Status: DISCONTINUED | OUTPATIENT
Start: 2021-04-01 | End: 2021-04-02 | Stop reason: HOSPADM

## 2021-04-01 RX ORDER — GABAPENTIN 300 MG/1
600 CAPSULE ORAL
Status: DISCONTINUED | OUTPATIENT
Start: 2021-04-01 | End: 2021-04-02 | Stop reason: HOSPADM

## 2021-04-01 RX ORDER — DEXTROSE MONOHYDRATE 25 G/50ML
25 INJECTION, SOLUTION INTRAVENOUS
Status: DISCONTINUED | OUTPATIENT
Start: 2021-04-01 | End: 2021-04-02 | Stop reason: HOSPADM

## 2021-04-01 RX ORDER — PANTOPRAZOLE SODIUM 40 MG/1
40 TABLET, DELAYED RELEASE ORAL NIGHTLY
Status: DISCONTINUED | OUTPATIENT
Start: 2021-04-01 | End: 2021-04-02 | Stop reason: HOSPADM

## 2021-04-01 RX ORDER — AMLODIPINE BESYLATE 5 MG/1
5 TABLET ORAL
Status: DISCONTINUED | OUTPATIENT
Start: 2021-04-01 | End: 2021-04-02 | Stop reason: HOSPADM

## 2021-04-01 RX ORDER — ACETAMINOPHEN 325 MG/1
650 TABLET ORAL EVERY 4 HOURS PRN
Status: DISCONTINUED | OUTPATIENT
Start: 2021-04-01 | End: 2021-04-02 | Stop reason: HOSPADM

## 2021-04-01 RX ORDER — LIDOCAINE 50 MG/G
1 PATCH TOPICAL
Status: DISCONTINUED | OUTPATIENT
Start: 2021-04-01 | End: 2021-04-02 | Stop reason: HOSPADM

## 2021-04-01 RX ORDER — DILTIAZEM HYDROCHLORIDE 120 MG/1
120 CAPSULE, COATED, EXTENDED RELEASE ORAL DAILY
Status: DISCONTINUED | OUTPATIENT
Start: 2021-04-01 | End: 2021-04-02 | Stop reason: HOSPADM

## 2021-04-01 RX ORDER — ONDANSETRON 2 MG/ML
4 INJECTION INTRAMUSCULAR; INTRAVENOUS ONCE
Status: COMPLETED | OUTPATIENT
Start: 2021-04-01 | End: 2021-04-01

## 2021-04-01 RX ORDER — AMLODIPINE BESYLATE 5 MG/1
5 TABLET ORAL
Qty: 30 TABLET | Refills: 0 | Status: SHIPPED | OUTPATIENT
Start: 2021-04-02

## 2021-04-01 RX ORDER — BUTALBITAL, ACETAMINOPHEN AND CAFFEINE 50; 325; 40 MG/1; MG/1; MG/1
1 TABLET ORAL EVERY 4 HOURS PRN
Status: DISCONTINUED | OUTPATIENT
Start: 2021-04-01 | End: 2021-04-02 | Stop reason: HOSPADM

## 2021-04-01 RX ORDER — RANOLAZINE 500 MG/1
1000 TABLET, EXTENDED RELEASE ORAL EVERY 12 HOURS SCHEDULED
Status: DISCONTINUED | OUTPATIENT
Start: 2021-04-01 | End: 2021-04-02 | Stop reason: HOSPADM

## 2021-04-01 RX ORDER — TRAZODONE HYDROCHLORIDE 150 MG/1
300 TABLET ORAL NIGHTLY
Status: DISCONTINUED | OUTPATIENT
Start: 2021-04-01 | End: 2021-04-02 | Stop reason: HOSPADM

## 2021-04-01 RX ORDER — POTASSIUM CHLORIDE 750 MG/1
40 CAPSULE, EXTENDED RELEASE ORAL ONCE
Status: COMPLETED | OUTPATIENT
Start: 2021-04-01 | End: 2021-04-01

## 2021-04-01 RX ORDER — SODIUM CHLORIDE 0.9 % (FLUSH) 0.9 %
10 SYRINGE (ML) INJECTION EVERY 12 HOURS SCHEDULED
Status: DISCONTINUED | OUTPATIENT
Start: 2021-04-01 | End: 2021-04-02 | Stop reason: HOSPADM

## 2021-04-01 RX ORDER — METOPROLOL SUCCINATE 50 MG/1
50 TABLET, EXTENDED RELEASE ORAL DAILY
Status: DISCONTINUED | OUTPATIENT
Start: 2021-04-01 | End: 2021-04-02 | Stop reason: HOSPADM

## 2021-04-01 RX ORDER — ATORVASTATIN CALCIUM 40 MG/1
80 TABLET, FILM COATED ORAL NIGHTLY
Status: DISCONTINUED | OUTPATIENT
Start: 2021-04-01 | End: 2021-04-02 | Stop reason: HOSPADM

## 2021-04-01 RX ORDER — ONDANSETRON 2 MG/ML
4 INJECTION INTRAMUSCULAR; INTRAVENOUS EVERY 6 HOURS PRN
Status: DISCONTINUED | OUTPATIENT
Start: 2021-04-01 | End: 2021-04-02 | Stop reason: HOSPADM

## 2021-04-01 RX ORDER — PRAMIPEXOLE DIHYDROCHLORIDE 1 MG/1
1 TABLET ORAL NIGHTLY
Status: DISCONTINUED | OUTPATIENT
Start: 2021-04-01 | End: 2021-04-02 | Stop reason: HOSPADM

## 2021-04-01 RX ORDER — SODIUM CHLORIDE 0.9 % (FLUSH) 0.9 %
10 SYRINGE (ML) INJECTION AS NEEDED
Status: DISCONTINUED | OUTPATIENT
Start: 2021-04-01 | End: 2021-04-02 | Stop reason: HOSPADM

## 2021-04-01 RX ORDER — ISOSORBIDE MONONITRATE 60 MG/1
120 TABLET, EXTENDED RELEASE ORAL
Status: DISCONTINUED | OUTPATIENT
Start: 2021-04-01 | End: 2021-04-02 | Stop reason: HOSPADM

## 2021-04-01 RX ORDER — IPRATROPIUM BROMIDE AND ALBUTEROL SULFATE 2.5; .5 MG/3ML; MG/3ML
3 SOLUTION RESPIRATORY (INHALATION)
Status: DISCONTINUED | OUTPATIENT
Start: 2021-04-01 | End: 2021-04-02 | Stop reason: HOSPADM

## 2021-04-01 RX ORDER — FENOFIBRATE 145 MG/1
145 TABLET, COATED ORAL DAILY
Status: DISCONTINUED | OUTPATIENT
Start: 2021-04-01 | End: 2021-04-02 | Stop reason: HOSPADM

## 2021-04-01 RX ORDER — MAGNESIUM SULFATE 1 G/100ML
1 INJECTION INTRAVENOUS ONCE
Status: COMPLETED | OUTPATIENT
Start: 2021-04-01 | End: 2021-04-01

## 2021-04-01 RX ORDER — LISINOPRIL 40 MG/1
40 TABLET ORAL NIGHTLY
Status: DISCONTINUED | OUTPATIENT
Start: 2021-04-01 | End: 2021-04-02 | Stop reason: HOSPADM

## 2021-04-01 RX ORDER — METHOCARBAMOL 500 MG/1
500 TABLET, FILM COATED ORAL 4 TIMES DAILY
Status: DISCONTINUED | OUTPATIENT
Start: 2021-04-01 | End: 2021-04-02 | Stop reason: HOSPADM

## 2021-04-01 RX ADMIN — HUMAN INSULIN 5 UNITS: 100 INJECTION, SOLUTION SUBCUTANEOUS at 00:58

## 2021-04-01 RX ADMIN — ATORVASTATIN CALCIUM 80 MG: 40 TABLET, FILM COATED ORAL at 20:45

## 2021-04-01 RX ADMIN — METHOCARBAMOL 500 MG: 500 TABLET, FILM COATED ORAL at 10:16

## 2021-04-01 RX ADMIN — SODIUM CHLORIDE 500 ML: 9 INJECTION, SOLUTION INTRAVENOUS at 18:28

## 2021-04-01 RX ADMIN — PANTOPRAZOLE SODIUM 40 MG: 40 TABLET, DELAYED RELEASE ORAL at 20:45

## 2021-04-01 RX ADMIN — ISOSORBIDE MONONITRATE 120 MG: 60 TABLET, EXTENDED RELEASE ORAL at 08:48

## 2021-04-01 RX ADMIN — SODIUM CHLORIDE, PRESERVATIVE FREE 10 ML: 5 INJECTION INTRAVENOUS at 08:39

## 2021-04-01 RX ADMIN — IPRATROPIUM BROMIDE AND ALBUTEROL SULFATE 3 ML: 2.5; .5 SOLUTION RESPIRATORY (INHALATION) at 12:45

## 2021-04-01 RX ADMIN — INSULIN DETEMIR 40 UNITS: 100 INJECTION, SOLUTION SUBCUTANEOUS at 04:41

## 2021-04-01 RX ADMIN — PRASUGREL 10 MG: 10 TABLET, FILM COATED ORAL at 08:40

## 2021-04-01 RX ADMIN — IOPAMIDOL 90 ML: 755 INJECTION, SOLUTION INTRAVENOUS at 11:57

## 2021-04-01 RX ADMIN — MAGNESIUM SULFATE HEPTAHYDRATE 1 G: 1 INJECTION, SOLUTION INTRAVENOUS at 08:39

## 2021-04-01 RX ADMIN — NITROGLYCERIN 1 INCH: 20 OINTMENT TOPICAL at 02:53

## 2021-04-01 RX ADMIN — INSULIN DETEMIR 40 UNITS: 100 INJECTION, SOLUTION SUBCUTANEOUS at 08:45

## 2021-04-01 RX ADMIN — SODIUM CHLORIDE, PRESERVATIVE FREE 10 ML: 5 INJECTION INTRAVENOUS at 02:54

## 2021-04-01 RX ADMIN — METHOCARBAMOL 500 MG: 500 TABLET, FILM COATED ORAL at 18:03

## 2021-04-01 RX ADMIN — INSULIN ASPART 12 UNITS: 100 INJECTION, SOLUTION INTRAVENOUS; SUBCUTANEOUS at 18:02

## 2021-04-01 RX ADMIN — NITROGLYCERIN 0.4 MG: 0.4 TABLET SUBLINGUAL at 00:05

## 2021-04-01 RX ADMIN — MORPHINE SULFATE 4 MG: 4 INJECTION, SOLUTION INTRAMUSCULAR; INTRAVENOUS at 04:36

## 2021-04-01 RX ADMIN — GABAPENTIN 600 MG: 300 CAPSULE ORAL at 20:45

## 2021-04-01 RX ADMIN — PRAMIPEXOLE DIHYDROCHLORIDE 1 MG: 1 TABLET ORAL at 20:45

## 2021-04-01 RX ADMIN — NITROGLYCERIN 0.4 MG: 0.4 TABLET SUBLINGUAL at 00:10

## 2021-04-01 RX ADMIN — MORPHINE SULFATE 4 MG: 4 INJECTION INTRAVENOUS at 00:47

## 2021-04-01 RX ADMIN — ASPIRIN 81 MG: 81 TABLET, CHEWABLE ORAL at 00:05

## 2021-04-01 RX ADMIN — SODIUM CHLORIDE, PRESERVATIVE FREE 10 ML: 5 INJECTION INTRAVENOUS at 20:51

## 2021-04-01 RX ADMIN — INSULIN DETEMIR 40 UNITS: 100 INJECTION, SOLUTION SUBCUTANEOUS at 20:45

## 2021-04-01 RX ADMIN — MORPHINE SULFATE 4 MG: 4 INJECTION, SOLUTION INTRAMUSCULAR; INTRAVENOUS at 13:37

## 2021-04-01 RX ADMIN — METHOCARBAMOL 500 MG: 500 TABLET, FILM COATED ORAL at 12:56

## 2021-04-01 RX ADMIN — RANOLAZINE 1000 MG: 500 TABLET, FILM COATED, EXTENDED RELEASE ORAL at 10:15

## 2021-04-01 RX ADMIN — BUTALBITAL, ACETAMINOPHEN, AND CAFFEINE 1 TABLET: 50; 325; 40 TABLET ORAL at 18:28

## 2021-04-01 RX ADMIN — LIDOCAINE 1 PATCH: 50 PATCH CUTANEOUS at 14:57

## 2021-04-01 RX ADMIN — PRAMIPEXOLE DIHYDROCHLORIDE 1 MG: 1 TABLET ORAL at 02:53

## 2021-04-01 RX ADMIN — DILTIAZEM HYDROCHLORIDE 120 MG: 120 CAPSULE, COATED, EXTENDED RELEASE ORAL at 08:40

## 2021-04-01 RX ADMIN — RANOLAZINE 1000 MG: 500 TABLET, FILM COATED, EXTENDED RELEASE ORAL at 02:53

## 2021-04-01 RX ADMIN — LINAGLIPTIN 5 MG: 5 TABLET, FILM COATED ORAL at 10:16

## 2021-04-01 RX ADMIN — INSULIN ASPART 12 UNITS: 100 INJECTION, SOLUTION INTRAVENOUS; SUBCUTANEOUS at 08:40

## 2021-04-01 RX ADMIN — MORPHINE SULFATE 4 MG: 4 INJECTION, SOLUTION INTRAMUSCULAR; INTRAVENOUS at 20:51

## 2021-04-01 RX ADMIN — METOPROLOL SUCCINATE 50 MG: 50 TABLET, EXTENDED RELEASE ORAL at 08:40

## 2021-04-01 RX ADMIN — AMLODIPINE BESYLATE 5 MG: 5 TABLET ORAL at 08:48

## 2021-04-01 RX ADMIN — POTASSIUM CHLORIDE 40 MEQ: 10 CAPSULE, COATED, EXTENDED RELEASE ORAL at 08:40

## 2021-04-01 RX ADMIN — RIVAROXABAN 20 MG: 10 TABLET, FILM COATED ORAL at 18:03

## 2021-04-01 RX ADMIN — ONDANSETRON HYDROCHLORIDE 4 MG: 2 INJECTION, SOLUTION INTRAMUSCULAR; INTRAVENOUS at 00:45

## 2021-04-01 RX ADMIN — MORPHINE SULFATE 4 MG: 4 INJECTION, SOLUTION INTRAMUSCULAR; INTRAVENOUS at 08:48

## 2021-04-01 RX ADMIN — RANOLAZINE 1000 MG: 500 TABLET, FILM COATED, EXTENDED RELEASE ORAL at 20:44

## 2021-04-01 RX ADMIN — FENOFIBRATE 145 MG: 145 TABLET ORAL at 10:16

## 2021-04-01 RX ADMIN — LISINOPRIL 40 MG: 40 TABLET ORAL at 02:53

## 2021-04-01 RX ADMIN — IPRATROPIUM BROMIDE AND ALBUTEROL SULFATE 3 ML: 2.5; .5 SOLUTION RESPIRATORY (INHALATION) at 19:52

## 2021-04-01 RX ADMIN — INSULIN ASPART 16 UNITS: 100 INJECTION, SOLUTION INTRAVENOUS; SUBCUTANEOUS at 12:54

## 2021-04-01 RX ADMIN — ATORVASTATIN CALCIUM 80 MG: 40 TABLET, FILM COATED ORAL at 02:53

## 2021-04-01 RX ADMIN — TRAZODONE HYDROCHLORIDE 300 MG: 150 TABLET ORAL at 20:45

## 2021-04-01 RX ADMIN — PANTOPRAZOLE SODIUM 40 MG: 40 TABLET, DELAYED RELEASE ORAL at 02:53

## 2021-04-01 RX ADMIN — ACETAMINOPHEN 650 MG: 325 TABLET ORAL at 16:03

## 2021-04-01 RX ADMIN — GABAPENTIN 600 MG: 300 CAPSULE ORAL at 02:53

## 2021-04-01 RX ADMIN — METHOCARBAMOL 500 MG: 500 TABLET, FILM COATED ORAL at 20:44

## 2021-04-01 RX ADMIN — GABAPENTIN 600 MG: 300 CAPSULE ORAL at 10:20

## 2021-04-01 NOTE — SIGNIFICANT NOTE
04/01/21 1430   OTHER   Discipline physical therapist;occupational therapist   Rehab Time/Intention   Session Not Performed patient/family declined, not feeling well   Recommendation   PT - Next Appointment 04/02/21   Recommendation   OT - Next Appointment 04/02/21     PT/OT initial evaluations attempted. Nurse reports patient is Independent with functional mobility but currently feeling poorly. Patient asleep, will check back tomorrow.

## 2021-04-01 NOTE — PROGRESS NOTES
"Bridge Note    Yordy Hansen, a 40 y.o. male presented 8 hour(s) ago for a chief complaint of Chest pain, unspecified type [R07.9]. I have reviewed the patient's H&P and discussed the care with the admitting provider. Nursing notes have also been reviewed. There are no new developments or changes to the patient's acuity of condition, no acute events, and no change to his plan of treatment at this time.    Will check CT angiogram today. Hx of PE.   Await cardiology rec's.     Vitals:    04/01/21 0352 04/01/21 0729 04/01/21 0738 04/01/21 1000   BP: 132/68  142/71    BP Location: Right arm  Right arm    Patient Position: Lying  Lying    Pulse: 80 72 72    Resp: 24  22    Temp: 97.6 °F (36.4 °C)  97.7 °F (36.5 °C)    TempSrc: Temporal  Temporal    SpO2: 95%  96%    Weight:    (!) 202 kg (445 lb 5.3 oz)   Height:    180.3 cm (70.98\")           This document has been electronically signed by Dinah Lin MD on April 1, 2021 10:45 CDT      "

## 2021-04-01 NOTE — CONSULTS
"    [facility] [place]    Referring Provider: Santhosh Covington MD    Reason for Consultation: chest pain    Patient Care Team:  Mami Perez APRN as PCP - General (Family Medicine)  Gonsalo Hogue MD as Consulting Physician (Hospitalist)    Chief complaint   Chief Complaint   Patient presents with   • Chest Pain       Subjective .     History of present illness:     Yordy Hansen is a 40-year-old  male with past medical history of CAD (July 2016 PCI to LAD2.5 x 16mm at [facility] , March 2017 heart cath showed instent restenosis to LAD successful PCI 2.5 x 18 mm xience alpine stent was deployed successfully; 07/10/2019 90% distal edge InStent stenosis status post PCI and stenting using 3.0 x 13mm Orsiro SOPHIA);  type 2 diabetes, factor II deficiency, fatty liver, GERD, hyper tension, hyperlipidemia, morbid obesity, PE (managed with NOAC of xarelto), PAF, and sleep apnea who presented to ER with complaints of chest pain.    Patient reports he has been having intermittent episodes of chest \"pressure\" to the left and right substernal chest, radiating to his right shoulder and back.  Patient mentions he usually takes nitroglycerin few times a week however past couple days he reports pain has worsened with associated symptoms of shortness of breath.  Patient additionally reports that his heart has been racing, elevated heart rate in the 130s at times.  Patient mentions having \"chest pain with every heartbeat\" prior to admission.  He reports compliance with all of his medications.  In the ER his blood pressure was found to be elevated at 221/91.  EKG showed sinus rhythm with no acute changes.  Troponin and proBNP negative.  Chest x-ray negative for acute changes.  Patient was admitted for observation.  Patient has CTA of chest scheduled for today and echocardiogram pending.      Patient was admitted to the hospital again in September with chest pain.  Subsequent nuclear stress test concerning for " moderate ischemia anterolateral walls of the underwent repeat cardiac cath.  Cardiac cath in September 2020 showed mild in-stent restenosis with LONNIE-3 flow in the LAD. Admitted again in October with MARIETTA. kg with RVR.      The CVD Risk score (NADEEN'Agostino, et al., 2008) failed to calculate for the following reasons:    The patient has a prior MI, stroke, CHF, or peripheral vascular disease diagnosis      Review of Systems  Review of Systems   Constitutional: Negative for activity change, chills, fatigue, fever and unexpected weight change.   HENT: Negative for hearing loss, nosebleeds, tinnitus, trouble swallowing and voice change.    Eyes: Negative for visual disturbance.   Respiratory: Positive for shortness of breath. Negative for apnea, cough, chest tightness and wheezing.    Cardiovascular: Positive for chest pain and palpitations. Negative for leg swelling.   Gastrointestinal: Negative for abdominal distention, abdominal pain and blood in stool.   Endocrine: Negative for cold intolerance, heat intolerance, polydipsia, polyphagia and polyuria.   Genitourinary: Negative.    Musculoskeletal: Positive for arthralgias and back pain.   Skin: Negative.    Allergic/Immunologic: Negative.    Neurological: Negative for seizures, syncope, speech difficulty, numbness and headaches.   Hematological: Negative for adenopathy. Does not bruise/bleed easily.   Psychiatric/Behavioral: Negative for agitation, behavioral problems and suicidal ideas. The patient is not nervous/anxious.        History  Past Medical History:   Diagnosis Date   • CAD (coronary artery disease)    • Chronic back pain    • Chronic pulmonary embolism (CMS/HCC)    • Coronary artery disease    • Diabetes mellitus (CMS/HCC)    • Factor II deficiency (CMS/HCC)    • Fatty liver    • GERD (gastroesophageal reflux disease)    • Hyperlipidemia    • Hypertension    • Morbid obesity (CMS/HCC)    • RLS (restless legs syndrome)    • Seizures (CMS/HCC)    • Sleep apnea     ,   Past Surgical History:   Procedure Laterality Date   • CARDIAC CATHETERIZATION N/A 3/15/2017   • CARDIAC CATHETERIZATION N/A 3/15/2017   • CARDIAC CATHETERIZATION N/A 3/1/2018   • CARDIAC CATHETERIZATION N/A 2020   • CHOLECYSTECTOMY     • CORONARY ANGIOPLASTY WITH STENT PLACEMENT     • OH RT/LT HEART CATHETERS N/A 3/15/2017   • TRANSESOPHAGEAL ECHOCARDIOGRAM ([facility])     ,   Family History   Problem Relation Age of Onset   • Heart disease Mother    • Obesity Mother    • Sleep apnea Father    • Cancer Paternal Grandfather    ,   Social History     Tobacco Use   • Smoking status: Former Smoker     Packs/day: 0.25     Years: 0.50     Pack years: 0.12     Types: Cigarettes     Quit date:      Years since quittin.2   • Smokeless tobacco: Current User     Types: Snuff   Vaping Use   • Vaping Use: Never used   Substance Use Topics   • Alcohol use: No   • Drug use: No   ,   Medications Prior to Admission   Medication Sig Dispense Refill Last Dose   • albuterol (PROVENTIL HFA;VENTOLIN HFA) 108 (90 BASE) MCG/ACT inhaler Inhale 2 puffs Every 4 (Four) Hours As Needed for Wheezing.      • atorvastatin (LIPITOR) 80 MG tablet Take 1 tablet by mouth Every Night. 90 tablet 3    • dilTIAZem CD (Cardizem CD) 120 MG 24 hr capsule Take 1 capsule by mouth Daily. 90 capsule 0    • fenofibrate micronized (LOFIBRA) 134 MG capsule Take 1 capsule by mouth Every Morning Before Breakfast. 90 capsule 3    • gabapentin (NEURONTIN) 300 MG capsule Take 2 capsules by mouth 2 (Two) Times a Day. 120 capsule 0    • glucose blood test strip Use as instructed 100 each 12    • glucose monitor monitoring kit 1 each As Needed (check blood glucose 3x daily). 1 each 3    • insulin glargine (LANTUS) 100 UNIT/ML injection Inject 60 Units under the skin into the appropriate area as directed Every Night. 50 mL 3    • insulin lispro (HumaLOG) 100 UNIT/ML injection Inject 15 Units under the skin into the appropriate area as directed 3 (Three) Times  "a Day Before Meals. (Patient taking differently: Inject 25 Units under the skin into the appropriate area as directed 3 (Three) Times a Day Before Meals.) 30 mL 3    • isosorbide mononitrate (IMDUR) 120 MG 24 hr tablet Take 1 tablet by mouth Every Morning for 30 days. 30 tablet 0    • lisinopril (PRINIVIL,ZESTRIL) 40 MG tablet Take 1 tablet by mouth Every Night. 90 tablet 3    • methocarbamol (Robaxin) 500 MG tablet Take 1 tablet by mouth 4 (Four) Times a Day. 56 tablet 3    • metoprolol succinate XL (TOPROL-XL) 50 MG 24 hr tablet Take 1 tablet by mouth Daily. 90 tablet 3    • Microlet Lancets misc One touch delica lancets 100 each 5    • nitroglycerin (NITROSTAT) 0.4 MG SL tablet Place 1 tablet under the tongue Every 5 (Five) Minutes As Needed for Chest Pain for up to 15 days. 25 tablet 6    • pantoprazole (PROTONIX) 40 MG EC tablet Take 1 tablet by mouth Every Night. 90 tablet 3    • pramipexole (MIRAPEX) 1 MG tablet Take 2 tablets by mouth Every Night. 180 tablet 3    • prasugrel (EFFIENT) 10 MG tablet Take 1 tablet by mouth Daily. 90 tablet 3    • ranolazine (RANEXA) 1000 MG 12 hr tablet Take 1 tablet by mouth Every 12 (Twelve) Hours. 60 tablet 5    • ReliOn Insulin Syringe 31G X 15/64\" 0.5 ML misc For use with insulin 100 each 5    • rivaroxaban (XARELTO) 20 MG tablet Take 1 tablet by mouth Daily With Dinner. 90 tablet 3    • SITagliptin (Januvia) 100 MG tablet Take 1 tablet by mouth Daily. 30 tablet 5    • traZODone (DESYREL) 300 MG tablet Take 1 tablet by mouth every night at bedtime. 90 tablet 3       ALLERGIES  Glipizide, Reglan [metoclopramide], Tramadol, and Risperidone    Objective     Vital Sign Min/Max for last 24 hours  Temp  Min: 97.1 °F (36.2 °C)  Max: 97.7 °F (36.5 °C)   BP  Min: 132/68  Max: 221/91   Pulse  Min: 72  Max: 103   Resp  Min: 22  Max: 24   SpO2  Min: 95 %  Max: 97 %   Flow (L/min)  Min: 2  Max: 2   Weight  Min: 195 kg (430 lb)  Max: 202 kg (445 lb 6.4 oz)     Flowsheet Rows      First " "Filed Value   Admission Height  180.3 cm (71\") Documented at 04/01/2021 0022   Admission Weight  (!) 195 kg (430 lb) Documented at 04/01/2021 0022             Physical Exam:  Physical Exam  Vitals and nursing note reviewed.   Constitutional:       General: He is not in acute distress.     Appearance: He is morbidly obese. He is not ill-appearing, toxic-appearing or diaphoretic.   HENT:      Head: Normocephalic and atraumatic.      Right Ear: External ear normal.      Left Ear: External ear normal.   Eyes:      General: Lids are normal.      Conjunctiva/sclera: Conjunctivae normal.      Pupils: Pupils are equal, round, and reactive to light.   Neck:      Vascular: No JVD.   Cardiovascular:      Rate and Rhythm: Normal rate and regular rhythm.      Heart sounds: Normal heart sounds, S1 normal and S2 normal. No murmur heard.   No friction rub. No gallop. No S3 or S4 sounds.    Pulmonary:      Effort: Pulmonary effort is normal. No respiratory distress.      Breath sounds: No stridor. Decreased breath sounds present. No wheezing or rales.   Chest:      Chest wall: Tenderness (upon palpation to right chest wall) present.   Abdominal:      General: Bowel sounds are normal. There is no distension.      Palpations: Abdomen is soft.      Tenderness: There is no abdominal tenderness.   Musculoskeletal:      Cervical back: Normal range of motion and neck supple.      Right lower leg: No edema.      Left lower leg: No edema.   Skin:     General: Skin is warm and dry.      Capillary Refill: Capillary refill takes 2 to 3 seconds.      Findings: No erythema or rash.   Neurological:      Mental Status: He is alert and oriented to person, place, and time.      Cranial Nerves: No cranial nerve deficit.      Deep Tendon Reflexes: Reflexes are normal and symmetric. Reflexes normal.   Psychiatric:         Behavior: Behavior normal.         Thought Content: Thought content normal.         Judgment: Judgment normal.            Results " Review:     Results from last 7 days   Lab Units 04/01/21  0000   SODIUM mmol/L 138   POTASSIUM mmol/L 3.6   CHLORIDE mmol/L 101   CO2 mmol/L 25.0   BUN mg/dL 7   CREATININE mg/dL 0.67*   CALCIUM mg/dL 9.0   BILIRUBIN mg/dL 0.2   ALK PHOS U/L 80   ALT (SGPT) U/L 27   AST (SGOT) U/L 26   GLUCOSE mg/dL 386*       Estimated Creatinine Clearance: 256 mL/min (A) (by C-G formula based on SCr of 0.67 mg/dL (L)).    Results from last 7 days   Lab Units 04/01/21  0000   MAGNESIUM mg/dL 1.8       Results from last 7 days   Lab Units 04/01/21  0000   WBC 10*3/mm3 7.43   HEMOGLOBIN g/dL 13.9   PLATELETS 10*3/mm3 187             Lab Results   Component Value Date    CKTOTAL 242 (H) 01/06/2021    CKMB 2.2 12/12/2020    CKMBINDEX 0.61 04/01/2020    TROPONINI <0.02 03/19/2021    TROPONINT <0.010 04/01/2021     Lab Results   Component Value Date    PROBNP 45.3 04/01/2021       I/O last 3 completed shifts:  In: -   Out: 1100 [Urine:1100]    Cardiographics  ECG/EMG Results (last 24 hours)     Procedure Component Value Units Date/Time    ECG 12 Lead [[id number]] Collected: 03/31/21 2354     Updated: 04/01/21 0805    Adult Transthoracic Echo Complete W/ Cont if Necessary Per Protocol [[id number]] Collected: 04/01/21 0922     Updated: 04/01/21 1003     BSA 3.0 m^2      RVIDd 3.4 cm      IVSd 1.1 cm      LVIDd 5.9 cm      LVIDs 4.3 cm      LVPWd 1.1 cm      IVS/LVPW 0.99     FS 27.9 %      EDV(Teich) 174.6 ml      ESV(Teich) 81.7 ml      EF(Teich) 53.2 %      EDV(cubed) 207.5 ml      ESV(cubed) 77.9 ml      EF(cubed) 62.5 %      LV mass(C)d 258.8 grams      LV mass(C)dI 87.3 grams/m^2      SV(Teich) 92.9 ml      SI(Teich) 31.3 ml/m^2      SV(cubed) 129.6 ml      SI(cubed) 43.8 ml/m^2      Ao root diam 3.8 cm      Ao root area 11.3 cm^2      ACS 2.7 cm      LA dimension 4.1 cm      asc Aorta Diam 3.2 cm      LA/Ao 1.1     LVOT diam 2.2 cm      LVOT area 3.8 cm^2      LVOT area(traced) 3.8 cm^2      LVLd ap4 9.6 cm      EDV(MOD-sp4) 157.0 ml       LVLs ap4 8.6 cm      ESV(MOD-sp4) 74.6 ml      EF(MOD-sp4) 52.5 %      LVLd ap2 8.9 cm      EDV(MOD-sp2) 121.0 ml      LVLs ap2 7.5 cm      ESV(MOD-sp2) 50.3 ml      EF(MOD-sp2) 58.4 %      SV(MOD-sp4) 82.4 ml      SI(MOD-sp4) 27.8 ml/m^2      SV(MOD-sp2) 70.7 ml      SI(MOD-sp2) 23.9 ml/m^2      Ao root area (BSA corrected) 1.3     LV Musa Vol (BSA corrected) 53.0 ml/m^2      LV Sys Vol (BSA corrected) 25.2 ml/m^2      MV E max silvia 125.0 cm/sec      MV A max silvia 88.8 cm/sec      MV E/A 1.4     MV V2 max 122.0 cm/sec      MV max PG 6.0 mmHg      MV V2 mean 67.9 cm/sec      MV mean PG 2.0 mmHg      MV V2 VTI 33.7 cm      MVA(VTI) 3.9 cm^2      MV P1/2t max silvia 122.0 cm/sec      MV P1/2t 47.0 msec      MVA(P1/2t) 4.7 cm^2      MV dec slope 761.0 cm/sec^2      Ao pk silvia 189.0 cm/sec      Ao max PG 14.3 mmHg      Ao max PG (full) 3.9 mmHg      Ao V2 mean 148.0 cm/sec      Ao mean PG 9.0 mmHg      Ao mean PG (full) 2.0 mmHg      Ao V2 VTI 41.5 cm      TANVIR(I,A) 3.2 cm^2      TANVIR(I,D) 3.2 cm^2      TANVIR(V,A) 3.2 cm^2      TANVIR(V,D) 3.2 cm^2      LV V1 max PG 10.4 mmHg      LV V1 mean PG 7.0 mmHg      LV V1 max 161.0 cm/sec      LV V1 mean 131.0 cm/sec      LV V1 VTI 34.5 cm      SV(Ao) 470.7 ml      SI(Ao) 158.9 ml/m^2      SV(LVOT) 131.1 ml      SI(LVOT) 44.3 ml/m^2      PA V2 max 119.0 cm/sec      PA max PG 5.7 mmHg      TR max silvia 236.0 cm/sec      RVSP(TR) 27.3 mmHg      RAP systole 5.0 mmHg      MVA P1/2T LCG 1.8 cm^2       CV ECHO ADALID - BZI_BMI 62.1 kilograms/m^2       CV ECHO ADALID - BSA(HAYCOCK) 3.3 m^2       CV ECHO ADALID - BZI_METRIC_WEIGHT 201.9 kg       CV ECHO ADALID - BZI_METRIC_HEIGHT 180.3 cm      Target HR (85%) 151 bpm      Max. Pred. HR (100%) 178 bpm         Results for orders placed during the hospital encounter of 08/29/20    Adult Transthoracic Echo Complete W/ Cont if Necessary Per Protocol    Interpretation Summary  · Normal EF 51-55%.        CT Head Without Contrast    Result Date:  4/1/2021  No acute change since January 19, 2021. Thank you for allowing us to participate in the care of your patient. Electronically signed by:  Julito Tyson DO  4/1/2021 12:26 AM CDT Workstation: [id number]    XR Chest 1 View    Result Date: 4/1/2021  No active disease by portable imaging. Electronically signed by:  Mikey Grace MD  4/1/2021 12:20 AM CDT Workstation: [id number]      Intake/Output       03/31/21 0700 - 04/01/21 0659 04/01/21 0700 - 04/02/21 0659    Intake (ml) -- 240    Output (ml) 1100 --    Net (ml) -1100 240    Last Weight  (!) 202 kg (445 lb 6.4 oz)  (!) 202 kg (445 lb 5.3 oz)            Assessment/Plan       Chest pain    GERD (gastroesophageal reflux disease)    Morbid obesity (CMS/HCC)    Hypertension    Type 2 diabetes mellitus with hyperglycemia, with long-term current use of insulin (CMS/HCC)    Chest pain/ hx of CAD with multiple PCI as described above to LAD.   ACS has been ruled out. The patient's CP is atypical. The patient's EKG is Normal. Troponin is negative. Patient is still having intermittent chest pain.  The patient is hemodynamically stable. 3 layers of stent in his mid distal LAD.  Recent cardiac cath in September 2020 was with patent stent with mild in-stent restenosis. No further invasive work up or noninvasive stress testing would be recommended at this time. Medical management as discussed below.  There is no clear exertional component and patient does have musculoskeletal tenderness and other vague complaints.  Do agree with CTA of chest given shortness of breath complaints.  Echocardiogram pending today.    Start amlodipine 5 mg  Continue Effient 10 mg  Continue high-intensity statin with atorvastatin 80 mg nightly  Continue antianginal therapy with Imdur 120 mg and Ranexa 1000 mg twice daily  Continue beta-blocker therapy with metoprolol succinate XL 50 mg daily  Continue antihypertensive therapy with lisinopril 40 mg and Cardizem  mg  Discussed with  patient about the need for aggressive risk factor modification, including weight loss, dietary changes, and smoking cessation.     A. fib: admitted in October with MARIETTAMatt saul with RVR.  Converted sinus rhythm on his own.  [id number] score is 3.  He is already anticoagulation with Xarelto. Currently normal sinus continue metoprolol and Cardizem.   -Would recommend Holter monitor as an outpatient due to patient complaints of palpitations and elevated HR in 130's.     HTN: chronic, continue anti-hypertensive therapy as discussed above.     History of PE: Xarelto      Type 2 diabetes: Continue sliding scale insulin     Hyperlipidemia: Continue atorvastatin     ADOLFO: Patient has compliance with CPAP machine. Wanting to see sleep medicine for setting adjustments. Follow up with sleep medicine can be arranged.         Yordy Hansen  reports that he quit smoking about 23 years ago. His smoking use included cigarettes. He has a 0.13 pack-year smoking history. His smokeless tobacco use includes snuff.. I have educated him on the risk of diseases from using tobacco products such as cancer, COPD and heart diease.        I discussed the patients findings and my recommendations with patient and Dr. Tejada.                This document has been electronically signed by LALA Conway on April 1, 2021 10:11 CDT           I personally saw and examined Yordy Hansen after the APRN.  I personally performed a history and physical examination of the patient.  I personally reviewed independent findings and plan of care.  I discussed management with the APRN.  I agree with the APRN's documentation.    This is a 40 y.o. male with:     1.  Coronary artery disease  2.  Diabetes  3.  Hyperlipidemia  5 hypertension  6.  Morbid obesity  7.  Pulmonary embolism  8.  A. fib      Yordy Hansen is a 40 y.o.  very pleasant 39-year-old gentleman with history of hypertension, hyperlipidemia, diabetes with extensive history of coronary  "artery disease, he has PCI x3 to his LAD.  First 1 was in 2016, in the setting of myocardial infarction, subsequently he had in-stent restenosis in 2017 so he got another layer of stent with a 2.5 x 18 mm Xience, subsequently he was followed by Dr. Briceno and had another PCI to his LAD with a 3.0 Orsiro stent.   Patient was admitted to the hospital again in September with chest pain.  Subsequent nuclear stress test concerning for moderate ischemia anterolateral walls of the underwent repeat cardiac cath.  Cardiac cath in September 2020 showed mild in-stent restenosis with LONNIE-3 flow in the LAD.    Patient had multiple administrations with atypical chest pain last few months.  He had COVID-19 infection in winter as well.  Patient is presented hospital with an episode of chest pain.  He described the pain as tenderness in character, localized to his substernal and right side of the area it gets reproduced with palpation.  No chest pain on exertion.  Pain has been intermittent.  On my visit patient lying comfortably in bed without any acute distress.    Vitals:    04/01/21 1000 04/01/21 1103 04/01/21 1245 04/01/21 1249   BP:  119/59     BP Location:  Right arm     Patient Position:  Lying     Pulse:  67 75 72   Resp:  22 20 20   Temp:  97.7 °F (36.5 °C)     TempSrc:  Temporal     SpO2:  95% 96% 98%   Weight: (!) 202 kg (445 lb 5.3 oz)      Height: 180.3 cm (70.98\")          Labs reviewed.  Troponin negative x2.  D-dimer borderline elevated.  BNP normal    Telemetry reviewed  EKG reviewed  Echo reviewed    Assessment and plan:    1.  Chest pain:  Patient has risk factors of hypertension, hyperlipidemia, diabetes. 3 layers of stent in his mid distal LAD.  Recent cardiac cath in September 2020 was with patent stent with mild in-stent restenosis.  Patient has been ruled out for acute coronary syndrome, EKG without any ischemic changes, troponins have been negative.  Echocardiogram with no regional wall motion " abnormalities in the LAD territory with normal LV systolic function.  His chest pain is rather atypical in character.  He is not a good candidate for noninvasive evaluation with a nuclear stress test because of his morbid obesity and previously false positive stress test, with 3 layers of stents a CTA would not be an optimal option either.  I had a long discussion with the patient, since his chest pain is atypical, I would recommend continuing medical management at this point and rule out noncardiac causes for chest pain.  I explained the increased risk of complications, particularly to recurrent exposure to radiation in setting of his morbid obesity as well as increased risk of bleeding, related to invasive evaluation at this point.  Additionally he already had his 3 L of stents and a small caliber distal LAD.  Since patient has been ruled out for acute coronary syndrome I would not recommend further ischemic work-up at this point continue medical management.    Continue Effient/Cardizem/Imdur/Toprol-XL and Ranexa.  Rule out other noncardiac causes for chest pain.  D-dimer was borderline elevated we will plan for a CTA  Optimal diabetes control    2.  Hypertension: Well-controlled    3.  Pulmonary embolism:  On Xarelto    4.  A. fib:  Was diagnosed with A. fib in October converted to sinus rhythm on his own.  Peter Vascor is 1 is already anticoagulation with Xarelto.    Thank you for the consult.              Electronically signed by Demetri Tejada MD, 04/01/21, 1:17 PM CDT.              Electronically signed by LALA Conway, 04/01/21, 11:02 AM CDT.

## 2021-04-01 NOTE — PLAN OF CARE
Goal Outcome Evaluation:  Plan of Care Reviewed With: patient  Progress: no change     Pt complained of headache and dizziness this afternoon. He has had similar symptoms periodically for about a month. MD ordered bolus and fiorocet just before shift change. Vital signs stable

## 2021-04-01 NOTE — ED PROVIDER NOTES
"Subjective   40-year-old white male with a history of multiple medical problems including coronary artery disease status post stents, atrial fibrillation, pulmonary embolism, anticoagulated with Xarelto, COPD, type 2 diabetes, hypertension, and hyperlipidemia presents to the emergency department with chief complaint of chest pain with shortness of breath that started approximately 2 hours ago.  He describes the pain as \"pressure\".  The pain radiates to his left shoulder.  He rates the pain as 8 out of 10 severity.  Associated symptoms include nausea and vomiting.  He denies diaphoresis.          Review of Systems   Constitutional: Negative for chills, diaphoresis and fever.   Respiratory: Positive for shortness of breath. Negative for cough.    Cardiovascular: Positive for chest pain and palpitations.   Gastrointestinal: Positive for nausea and vomiting. Negative for abdominal pain.   Genitourinary: Negative for dysuria.   Musculoskeletal: Negative for back pain and neck pain.   Neurological: Positive for headaches. Negative for seizures, syncope and weakness.   All other systems reviewed and are negative.      Past Medical History:   Diagnosis Date   • CAD (coronary artery disease)    • Chronic back pain    • Chronic pulmonary embolism (CMS/HCC)    • Coronary artery disease    • Diabetes mellitus (CMS/HCC)    • Factor II deficiency (CMS/HCC)    • Fatty liver    • GERD (gastroesophageal reflux disease)    • Hyperlipidemia    • Hypertension    • Morbid obesity (CMS/HCC)    • RLS (restless legs syndrome)    • Seizures (CMS/HCC)    • Sleep apnea        Allergies   Allergen Reactions   • Glipizide Other (See Comments) and Unknown (See Comments)     Slurred Speech  Slurred Speech  Hallucinations, Slurred Speech   • Reglan [Metoclopramide] Anxiety   • Tramadol Other (See Comments)     seizures   • Risperidone Other (See Comments)     Slurred speech  Can't stand, trouble breathing, slurred speech         Past Surgical " History:   Procedure Laterality Date   • CARDIAC CATHETERIZATION N/A 3/15/2017   • CARDIAC CATHETERIZATION N/A 3/15/2017   • CARDIAC CATHETERIZATION N/A 3/1/2018   • CARDIAC CATHETERIZATION N/A 2020   • CHOLECYSTECTOMY     • CORONARY ANGIOPLASTY WITH STENT PLACEMENT     • TN RT/LT HEART CATHETERS N/A 3/15/2017   • TRANSESOPHAGEAL ECHOCARDIOGRAM ([facility])         Family History   Problem Relation Age of Onset   • Heart disease Mother    • Obesity Mother    • Sleep apnea Father    • Cancer Paternal Grandfather        Social History     Socioeconomic History   • Marital status:      Spouse name: Cori   • Number of children: 0   • Years of education: Not on file   • Highest education level: Not on file   Tobacco Use   • Smoking status: Former Smoker     Packs/day: 0.25     Years: 0.50     Pack years: 0.12     Types: Cigarettes     Quit date:      Years since quittin.2   • Smokeless tobacco: Current User     Types: Snuff   Vaping Use   • Vaping Use: Never used   Substance and Sexual Activity   • Alcohol use: No   • Drug use: No   • Sexual activity: Not Currently           Objective   Physical Exam  Vitals and nursing note reviewed.   Constitutional:       General: He is not in acute distress.     Appearance: He is obese. He is not diaphoretic.   HENT:      Head: Normocephalic and atraumatic.      Right Ear: External ear normal.      Left Ear: External ear normal.      Nose: Nose normal.      Mouth/Throat:      Mouth: Mucous membranes are moist.      Pharynx: Oropharynx is clear.   Eyes:      Extraocular Movements: Extraocular movements intact.      Conjunctiva/sclera: Conjunctivae normal.      Pupils: Pupils are equal, round, and reactive to light.   Cardiovascular:      Rate and Rhythm: Regular rhythm. Tachycardia present.      Pulses: Normal pulses.      Heart sounds: Normal heart sounds.   Pulmonary:      Effort: Pulmonary effort is normal.      Breath sounds: Normal breath sounds.   Abdominal:       General: Bowel sounds are normal.      Palpations: Abdomen is soft.      Tenderness: There is no abdominal tenderness.   Musculoskeletal:      Cervical back: Normal range of motion and neck supple.      Right lower leg: No edema.      Left lower leg: No edema.   Skin:     General: Skin is warm and dry.   Neurological:      General: No focal deficit present.      Mental Status: He is alert and oriented to person, place, and time.   Psychiatric:         Mood and Affect: Mood normal.         Behavior: Behavior normal.         ECG 12 Lead      Date/Time: 3/31/2021 11:54 PM  Performed by: Santhosh Covington MD  Authorized by: Santhosh Covington MD   Interpreted by physician  Rhythm: sinus rhythm  Rate: normal  BPM: 84  QRS axis: normal  Conduction: conduction normal  ST Segments: ST segments normal  T Waves: T waves normal  Clinical impression: normal ECG                 ED Course  ED Course as of Apr 01 0039   Thu Apr 01, 2021 0038 Case discussed with the hospitalist Dr. Crews.  He agrees to admit the patient to telemetry.    [DR]      ED Course User Index  [DR] Santhosh Covington MD            Labs Reviewed   COMPREHENSIVE METABOLIC PANEL - Abnormal; Notable for the following components:       Result Value    Glucose 386 (*)     Creatinine 0.67 (*)     Albumin 3.30 (*)     All other components within normal limits    Narrative:     GFR Normal >60  Chronic Kidney Disease <60  Kidney Failure <15     CBC WITH AUTO DIFFERENTIAL - Abnormal; Notable for the following components:    Immature Grans % 1.7 (*)     Immature Grans, Absolute 0.13 (*)     All other components within normal limits   TROPONIN (IN-HOUSE) - Normal    Narrative:     Troponin T Reference Range:  <= 0.03 ng/mL-   Negative for AMI  >0.03 ng/mL-     Abnormal for myocardial necrosis.  Clinicians would have to utilize clinical acumen, EKG, Troponin and serial changes to determine if it is an Acute Myocardial Infarction or myocardial injury due to an underlying chronic  condition.       Results may be falsely decreased if patient taking Biotin.     BNP (IN-HOUSE) - Normal    Narrative:     Among patients with dyspnea, NT-proBNP is highly sensitive for the detection of acute congestive heart failure. In addition NT-proBNP of <300 pg/ml effectively rules out acute congestive heart failure with 99% negative predictive value.    Results may be falsely decreased if patient taking Biotin.     CBC AND DIFFERENTIAL    Narrative:     The following orders were created for panel order CBC & Differential.  Procedure                               Abnormality         Status                     ---------                               -----------         ------                     CBC Auto Differential[[id number]]        Abnormal            Final result                 Please view results for these tests on the individual orders.   EXTRA TUBES    Narrative:     The following orders were created for panel order Extra Tubes.  Procedure                               Abnormality         Status                     ---------                               -----------         ------                     Light Blue Top[[id number]]                                   In process                 Gold Top - SST[[id number]]                                   In process                   Please view results for these tests on the individual orders.   LIGHT BLUE TOP   GOLD TOP - SST     CT Head Without Contrast    Result Date: 4/1/2021  Narrative: CLINICAL HISTORY: Headache COMPARISON: January 19, 2021 TECHNIQUE: This exam was performed according to our departmental dose-optimization program, which includes automated exposure control, adjustment of the mA and/or KV according to the patient's size and/or use of iterative reconstruction technique. MIP reconstructed sagittal and coronal images were also obtained. FINDINGS: There is no intracranial mass, hemorrhage, edema, evidence of infarction, midline shift, or  extra-axial fluid collection demonstrated. Bony structures of the face and skull appear intact. Paranasal sinuses are clear. Middle ear spaces and mastoid air cells are clear.     Impression: No acute change since January 19, 2021. Thank you for allowing us to participate in the care of your patient. Electronically signed by:  Julito Tyson DO  4/1/2021 12:26 AM CDT Workstation: [id number]    XR Chest 1 View    Result Date: 4/1/2021  Narrative: PORTABLE CHEST X-RAY CLINICAL HISTORY: chest pain, dyspnea COMPARISON:  1/19/2021. FINDINGS:  Single portable view of the chest obtained.  There are various overlying monitor leads/artifacts in place. The lungs are well expanded and clear where they can be visualized.  Cardiac size is within normal limits.  Vascularity is normal considering technique.  No pleural fluid is demonstrated by portable imaging.  Chronic left rib deformities are again noted.     Impression: No active disease by portable imaging. Electronically signed by:  Mikey Grace MD  4/1/2021 12:20 AM CDT Workstation: [id number]                                  HEART Score (for prediction of 6-week risk of major adverse cardiac event) reviewed and/or performed as part of the patient evaluation and treatment planning process.  The result associated with this review/performance is: 4       MDM    Final diagnoses:   Chest pain, unspecified type       ED Disposition  ED Disposition     ED Disposition Condition Comment    Decision to Admit            No follow-up provider specified.       Medication List      No changes were made to your prescriptions during this visit.          Santhosh Covington MD  04/01/21 0039

## 2021-04-02 ENCOUNTER — READMISSION MANAGEMENT (OUTPATIENT)
Dept: CALL CENTER | Facility: HOSPITAL | Age: 41
End: 2021-04-02

## 2021-04-02 VITALS
SYSTOLIC BLOOD PRESSURE: 127 MMHG | OXYGEN SATURATION: 96 % | BODY MASS INDEX: 44.1 KG/M2 | RESPIRATION RATE: 18 BRPM | TEMPERATURE: 97.8 F | DIASTOLIC BLOOD PRESSURE: 68 MMHG | HEART RATE: 70 BPM | HEIGHT: 71 IN | WEIGHT: 315 LBS

## 2021-04-02 LAB
ANION GAP SERPL CALCULATED.3IONS-SCNC: 10 MMOL/L (ref 5–15)
BASOPHILS # BLD AUTO: 0.05 10*3/MM3 (ref 0–0.2)
BASOPHILS NFR BLD AUTO: 0.6 % (ref 0–1.5)
BUN SERPL-MCNC: 10 MG/DL (ref 6–20)
BUN/CREAT SERPL: 13.2 (ref 7–25)
CALCIUM SPEC-SCNC: 9 MG/DL (ref 8.6–10.5)
CHLORIDE SERPL-SCNC: 102 MMOL/L (ref 98–107)
CO2 SERPL-SCNC: 21 MMOL/L (ref 22–29)
CREAT SERPL-MCNC: 0.76 MG/DL (ref 0.76–1.27)
DEPRECATED RDW RBC AUTO: 44.1 FL (ref 37–54)
EOSINOPHIL # BLD AUTO: 0.28 10*3/MM3 (ref 0–0.4)
EOSINOPHIL NFR BLD AUTO: 3.5 % (ref 0.3–6.2)
ERYTHROCYTE [DISTWIDTH] IN BLOOD BY AUTOMATED COUNT: 14.4 % (ref 12.3–15.4)
GFR SERPL CREATININE-BSD FRML MDRD: 112 ML/MIN/1.73
GLUCOSE BLDC GLUCOMTR-MCNC: 242 MG/DL (ref 70–130)
GLUCOSE BLDC GLUCOMTR-MCNC: 245 MG/DL (ref 70–130)
GLUCOSE SERPL-MCNC: 303 MG/DL (ref 65–99)
HCT VFR BLD AUTO: 40 % (ref 37.5–51)
HGB BLD-MCNC: 13.2 G/DL (ref 13–17.7)
IMM GRANULOCYTES # BLD AUTO: 0.26 10*3/MM3 (ref 0–0.05)
IMM GRANULOCYTES NFR BLD AUTO: 3.2 % (ref 0–0.5)
LYMPHOCYTES # BLD AUTO: 1.96 10*3/MM3 (ref 0.7–3.1)
LYMPHOCYTES NFR BLD AUTO: 24.3 % (ref 19.6–45.3)
MCH RBC QN AUTO: 28 PG (ref 26.6–33)
MCHC RBC AUTO-ENTMCNC: 33 G/DL (ref 31.5–35.7)
MCV RBC AUTO: 84.9 FL (ref 79–97)
MONOCYTES # BLD AUTO: 0.61 10*3/MM3 (ref 0.1–0.9)
MONOCYTES NFR BLD AUTO: 7.6 % (ref 5–12)
NEUTROPHILS NFR BLD AUTO: 4.89 10*3/MM3 (ref 1.7–7)
NEUTROPHILS NFR BLD AUTO: 60.8 % (ref 42.7–76)
NRBC BLD AUTO-RTO: 0 /100 WBC (ref 0–0.2)
PLATELET # BLD AUTO: 196 10*3/MM3 (ref 140–450)
PMV BLD AUTO: 9 FL (ref 6–12)
POTASSIUM SERPL-SCNC: 4.7 MMOL/L (ref 3.5–5.2)
RBC # BLD AUTO: 4.71 10*6/MM3 (ref 4.14–5.8)
SODIUM SERPL-SCNC: 133 MMOL/L (ref 136–145)
WBC # BLD AUTO: 8.05 10*3/MM3 (ref 3.4–10.8)

## 2021-04-02 PROCEDURE — 94760 N-INVAS EAR/PLS OXIMETRY 1: CPT

## 2021-04-02 PROCEDURE — 93010 ELECTROCARDIOGRAM REPORT: CPT | Performed by: INTERNAL MEDICINE

## 2021-04-02 PROCEDURE — 85025 COMPLETE CBC W/AUTO DIFF WBC: CPT | Performed by: INTERNAL MEDICINE

## 2021-04-02 PROCEDURE — 82962 GLUCOSE BLOOD TEST: CPT

## 2021-04-02 PROCEDURE — 80048 BASIC METABOLIC PNL TOTAL CA: CPT | Performed by: INTERNAL MEDICINE

## 2021-04-02 PROCEDURE — 63710000001 INSULIN ASPART PER 5 UNITS: Performed by: INTERNAL MEDICINE

## 2021-04-02 PROCEDURE — 94799 UNLISTED PULMONARY SVC/PX: CPT

## 2021-04-02 PROCEDURE — G0378 HOSPITAL OBSERVATION PER HR: HCPCS

## 2021-04-02 PROCEDURE — 99214 OFFICE O/P EST MOD 30 MIN: CPT | Performed by: INTERNAL MEDICINE

## 2021-04-02 PROCEDURE — 94660 CPAP INITIATION&MGMT: CPT

## 2021-04-02 PROCEDURE — 93005 ELECTROCARDIOGRAM TRACING: CPT | Performed by: INTERNAL MEDICINE

## 2021-04-02 PROCEDURE — 63710000001 INSULIN DETEMIR PER 5 UNITS: Performed by: INTERNAL MEDICINE

## 2021-04-02 RX ADMIN — LIDOCAINE 1 PATCH: 50 PATCH CUTANEOUS at 08:21

## 2021-04-02 RX ADMIN — IPRATROPIUM BROMIDE AND ALBUTEROL SULFATE 3 ML: 2.5; .5 SOLUTION RESPIRATORY (INHALATION) at 01:04

## 2021-04-02 RX ADMIN — METOPROLOL SUCCINATE 50 MG: 50 TABLET, EXTENDED RELEASE ORAL at 08:23

## 2021-04-02 RX ADMIN — FENOFIBRATE 145 MG: 145 TABLET ORAL at 08:21

## 2021-04-02 RX ADMIN — LINAGLIPTIN 5 MG: 5 TABLET, FILM COATED ORAL at 08:23

## 2021-04-02 RX ADMIN — INSULIN ASPART 8 UNITS: 100 INJECTION, SOLUTION INTRAVENOUS; SUBCUTANEOUS at 08:18

## 2021-04-02 RX ADMIN — AMLODIPINE BESYLATE 5 MG: 5 TABLET ORAL at 08:23

## 2021-04-02 RX ADMIN — IPRATROPIUM BROMIDE AND ALBUTEROL SULFATE 3 ML: 2.5; .5 SOLUTION RESPIRATORY (INHALATION) at 07:26

## 2021-04-02 RX ADMIN — PRASUGREL 10 MG: 10 TABLET, FILM COATED ORAL at 08:24

## 2021-04-02 RX ADMIN — GABAPENTIN 600 MG: 300 CAPSULE ORAL at 08:22

## 2021-04-02 RX ADMIN — METHOCARBAMOL 500 MG: 500 TABLET, FILM COATED ORAL at 11:31

## 2021-04-02 RX ADMIN — RANOLAZINE 1000 MG: 500 TABLET, FILM COATED, EXTENDED RELEASE ORAL at 08:23

## 2021-04-02 RX ADMIN — METHOCARBAMOL 500 MG: 500 TABLET, FILM COATED ORAL at 08:21

## 2021-04-02 RX ADMIN — INSULIN ASPART 8 UNITS: 100 INJECTION, SOLUTION INTRAVENOUS; SUBCUTANEOUS at 11:31

## 2021-04-02 RX ADMIN — ISOSORBIDE MONONITRATE 120 MG: 60 TABLET, EXTENDED RELEASE ORAL at 11:31

## 2021-04-02 RX ADMIN — DILTIAZEM HYDROCHLORIDE 120 MG: 120 CAPSULE, COATED, EXTENDED RELEASE ORAL at 08:23

## 2021-04-02 RX ADMIN — INSULIN DETEMIR 40 UNITS: 100 INJECTION, SOLUTION SUBCUTANEOUS at 08:16

## 2021-04-02 RX ADMIN — SODIUM CHLORIDE, PRESERVATIVE FREE 10 ML: 5 INJECTION INTRAVENOUS at 08:24

## 2021-04-02 NOTE — OUTREACH NOTE
Prep Survey      Responses   [facility] facility patient discharged from?  [place]   Is LACE score < 7 ?  No   Emergency Room discharge w/ pulse ox?  No   Eligibility  [facility]   Date of Admission  03/31/21   Date of Discharge  04/02/21   Discharge Disposition  Home or Self Care   Discharge diagnosis  Chest pain   Does the patient have one of the following disease processes/diagnoses(primary or secondary)?  Other   Does the patient have Home health ordered?  No   Is there a DME ordered?  No   Prep survey completed?  Yes          Radha Lopez RN

## 2021-04-02 NOTE — DISCHARGE SUMMARY
[facility] Medicine Services  DISCHARGE SUMMARY       Date of Admission: 3/31/2021  Date of Discharge:  4/2/2021  Primary Care Physician: Mami Perez APRN    Presenting Problem/History of Present Illness:  Chest pain, unspecified type [R07.9]       Final Discharge Diagnoses:  Active Hospital Problems    Diagnosis    • **Chest pain    • Type 2 diabetes mellitus with hyperglycemia, with long-term current use of insulin (CMS/[facility])    • Morbid obesity (CMS/[facility])    • Hypertension    • GERD (gastroesophageal reflux disease)      Resuming home medications.  -Pantoprazole 40 mg p.o. every morning         Consults:   Consults     Date and Time Order Name Status Description    4/1/2021  7:46 AM Inpatient Cardiology Consult Completed           Procedures Performed:                 Pertinent Test Results:   Imaging Results (Last 7 Days)     Procedure Component Value Units Date/Time    CT Angiogram Chest [[id number]] Collected: 04/01/21 1151     Updated: 04/01/21 1230    Narrative:        PROCEDURE: CT -CTA Thorax/PE with contrast     REASON FOR EXAM: PE suspected, low/intermediate prob, positive  D-dimer, R07.9 Chest pain, unspecified I48.0 Paroxysmal atrial  fibrillation    FINDINGS: Comparison study dated  February 20, 2019. Axial  computer tomography sequential imaging was performed from the  thoracic inlet through the diaphragms after administration of IV  contrast .3-D chest sagittal and coronal reformates was  performed. This exam was performed according to our departmental  dose optimization program, which includes automated exposure  control, adjustment of the mA and/or KV according to patient size  and/or use of iterative reconstruction technique.     Pulmonary arterial evaluation is limited secondary to suboptimal  injection. The central pulmonary arteries appear normal with no  filling defect identified to suggest pulmonary embolus. The more  distal pulmonary  arteries cannot be evaluated secondary to  suboptimal injection technique. If clinically concerned of PE,  consider repeat CT PE study or nuclear medicine ventilation  perfusion exam to further evaluate. Mediastinal structures of the  chest are otherwise normal. There is no lymphadenopathy or  pericardial effusion. Lungs are clear. Pleural spaces are normal.  Visualized upper abdominal structures are unremarkable. No acute  osseous abnormality.      Impression:      1.Pulmonary arterial evaluation is limited secondary to  suboptimal injection. The central pulmonary arteries appear  normal with no filling defect identified to suggest pulmonary  embolus. The more distal pulmonary arteries cannot be evaluated  secondary to suboptimal injection technique. If clinically  concerned of PE, consider repeat CT PE study or nuclear medicine  ventilation perfusion exam to further evaluate.   2. Otherwise unremarkable CT chest exam.    Electronically signed by:  Alvaro Ojeda MD  4/1/2021 12:29 PM CDT  Workstation: [id number]    CT Head Without Contrast [[id number]] Collected: 04/01/21 0008     Updated: 04/01/21 0027    Narrative:      CLINICAL HISTORY: Headache    COMPARISON: January 19, 2021    TECHNIQUE: This exam was performed according to our departmental  dose-optimization program, which includes automated exposure  control, adjustment of the mA and/or KV according to the  patient's size and/or use of iterative reconstruction technique.  MIP reconstructed sagittal and coronal images were also obtained.    FINDINGS: There is no intracranial mass, hemorrhage, edema,  evidence of infarction, midline shift, or extra-axial fluid  collection demonstrated. Bony structures of the face and skull  appear intact. Paranasal sinuses are clear. Middle ear spaces and  mastoid air cells are clear.      Impression:      No acute change since January 19, 2021.          Thank you for allowing us to participate in the care of  your  patient.    Electronically signed by:  Julito Tyson DO  4/1/2021 12:26  AM CDT Workstation: [id number]    XR Chest 1 View [[id number]] Collected: 04/01/21 0000     Updated: 04/01/21 0021    Narrative:      PORTABLE CHEST X-RAY    CLINICAL HISTORY: chest pain, dyspnea    COMPARISON:  1/19/2021.    FINDINGS:  Single portable view of the chest obtained.  There are  various overlying monitor leads/artifacts in place. The lungs are  well expanded and clear where they can be visualized.  Cardiac  size is within normal limits.  Vascularity is normal considering  technique.  No pleural fluid is demonstrated by portable imaging.   Chronic left rib deformities are again noted.        Impression:        No active disease by portable imaging.    Electronically signed by:  Mikey Grace MD  4/1/2021 12:20 AM  CDT Workstation: [id number]        Lab Results (last 72 hours)     Procedure Component Value Units Date/Time    POC Glucose Once [[id number]]  (Abnormal) Collected: 04/02/21 1030    Specimen: Blood Updated: 04/02/21 1117     Glucose 245 mg/dL      Comment: RN NotifiedOperator: [id number] CAROYLNE ABERNATHYYMeter ID: [id number]       Basic Metabolic Panel [[id number]]  (Abnormal) Collected: 04/02/21 0844    Specimen: Blood Updated: 04/02/21 0915     Glucose 303 mg/dL      BUN 10 mg/dL      Creatinine 0.76 mg/dL      Sodium 133 mmol/L      Potassium 4.7 mmol/L      Comment: Slight hemolysis detected by analyzer. Results may be affected.        Chloride 102 mmol/L      CO2 21.0 mmol/L      Calcium 9.0 mg/dL      eGFR Non African Amer 112 mL/min/1.73      BUN/Creatinine Ratio 13.2     Anion Gap 10.0 mmol/L     Narrative:      GFR Normal >60  Chronic Kidney Disease <60  Kidney Failure <15      CBC & Differential [[id number]]  (Abnormal) Collected: 04/02/21 0844    Specimen: Blood Updated: 04/02/21 0858    Narrative:      The following orders were created for panel order CBC & Differential.  Procedure                                Abnormality         Status                     ---------                               -----------         ------                     CBC Auto Differential[[id number]]        Abnormal            Final result                 Please view results for these tests on the individual orders.    CBC Auto Differential [[id number]]  (Abnormal) Collected: 04/02/21 0844    Specimen: Blood Updated: 04/02/21 0858     WBC 8.05 10*3/mm3      RBC 4.71 10*6/mm3      Hemoglobin 13.2 g/dL      Hematocrit 40.0 %      MCV 84.9 fL      MCH 28.0 pg      MCHC 33.0 g/dL      RDW 14.4 %      RDW-SD 44.1 fl      MPV 9.0 fL      Platelets 196 10*3/mm3      Neutrophil % 60.8 %      Lymphocyte % 24.3 %      Monocyte % 7.6 %      Eosinophil % 3.5 %      Basophil % 0.6 %      Immature Grans % 3.2 %      Neutrophils, Absolute 4.89 10*3/mm3      Lymphocytes, Absolute 1.96 10*3/mm3      Monocytes, Absolute 0.61 10*3/mm3      Eosinophils, Absolute 0.28 10*3/mm3      Basophils, Absolute 0.05 10*3/mm3      Immature Grans, Absolute 0.26 10*3/mm3      nRBC 0.0 /100 WBC     POC Glucose Once [[id number]]  (Abnormal) Collected: 04/02/21 0636    Specimen: Blood Updated: 04/02/21 0704     Glucose 242 mg/dL      Comment: RN NotifiedOperator: [id number] THAD ELRITAMeter ID: [id number]       POC Glucose Once [[id number]]  (Abnormal) Collected: 04/01/21 1951    Specimen: Blood Updated: 04/01/21 2045     Glucose 248 mg/dL      Comment: RN NotifiedOperator: [id number] THAD ELRITAMeter ID: [id number]       POC Glucose Once [[id number]]  (Abnormal) Collected: 04/01/21 1605    Specimen: Blood Updated: 04/01/21 1621     Glucose 265 mg/dL      Comment: RN NotifiedOperator: [id number] DANIELLE NIEVESFERMeter ID: [id number]       POC Glucose Once [[id number]]  (Abnormal) Collected: 04/01/21 1036    Specimen: Blood Updated: 04/01/21 1102     Glucose 314 mg/dL      Comment: RN NotifiedOperator: [id number] DANIELLE STAPLESMettay ID: [id number]       D-dimer,  Quantitative [[id number]]  (Abnormal) Collected: 04/01/21 0009    Specimen: Blood Updated: 04/01/21 1037     D-Dimer, Quantitative 615 ng/mL (FEU)     Narrative:      Dimer values <500 ng/ml FEU are FDA approved as aid in diagnosis of deep venous thrombosis and pulmonary embolism.  This test should not be used in an exclusion strategy with pretest probability alone.    A recent guideline regarding diagnosis for pulmonary thromboembolism recommends an adjusted exclusion criterion of age x 10 ng/ml FEU for patients >50 years of age ([employer]ori Intern Med 2015; 163: 701-711).      Extra Tubes [[id number]] Collected: 04/01/21 0639    Specimen: Blood, Venous Line Updated: 04/01/21 0747    Narrative:      The following orders were created for panel order Extra Tubes.  Procedure                               Abnormality         Status                     ---------                               -----------         ------                     Lavender Top[[id number]]                                     Final result                 Please view results for these tests on the individual orders.    Lavender Top [[id number]] Collected: 04/01/21 0639    Specimen: Blood Updated: 04/01/21 0747     Extra Tube hold for add-on     Comment: Auto resulted       Troponin [[id number]]  (Normal) Collected: 04/01/21 0639    Specimen: Blood Updated: 04/01/21 0710     Troponin T <0.010 ng/mL     Narrative:      Troponin T Reference Range:  <= 0.03 ng/mL-   Negative for AMI  >0.03 ng/mL-     Abnormal for myocardial necrosis.  Clinicians would have to utilize clinical acumen, EKG, Troponin and serial changes to determine if it is an Acute Myocardial Infarction or myocardial injury due to an underlying chronic condition.       Results may be falsely decreased if patient taking Biotin.      POC Glucose Once [[id number]]  (Abnormal) Collected: 04/01/21 0054    Specimen: Blood Updated: 04/01/21 0708     Glucose 345 mg/dL      Comment: : [id number] TOSHIA  EMIGDIOMeter ID: [id number]       POC Glucose Once [[id number]]  (Abnormal) Collected: 04/01/21 0614    Specimen: Blood Updated: 04/01/21 0654     Glucose 255 mg/dL      Comment: RN NotifiedOperator: [id number] HASMUKH ZEPEDAMeter ID: [id number]       POC Glucose Once [[id number]]  (Abnormal) Collected: 04/01/21 0203    Specimen: Blood Updated: 04/01/21 0216     Glucose 345 mg/dL      Comment: RN NotifiedOperator: [id number] HASMUKH ZEPEDAMeter ID: [id number]       Magnesium [[id number]]  (Normal) Collected: 04/01/21 0000    Specimen: Blood from Arm, Left Updated: 04/01/21 0215     Magnesium 1.8 mg/dL     COVID-19 and FLU A/B PCR - Swab, Nasopharynx [[id number]]  (Normal) Collected: 04/01/21 0053    Specimen: Swab from Nasopharynx Updated: 04/01/21 0115     COVID19 Not Detected     Influenza A PCR Not Detected     Influenza B PCR Not Detected    Narrative:      Fact sheet for providers: https://www.fda.gov/media/303497/download    Fact sheet for patients: https://www.fda.gov/media/085110/download    Test performed by PCR.    Extra Tubes [[id number]] Collected: 04/01/21 0009    Specimen: Blood, Venous Line Updated: 04/01/21 0115    Narrative:      The following orders were created for panel order Extra Tubes.  Procedure                               Abnormality         Status                     ---------                               -----------         ------                     Light Blue Top[[id number]]                                   Final result               Gold Top - SST[[id number]]                                   Final result                 Please view results for these tests on the individual orders.    Light Blue Top [[id number]] Collected: 04/01/21 0009    Specimen: Blood Updated: 04/01/21 0115     Extra Tube hold for add-on     Comment: Auto resulted       Gold Top - SST [[id number]] Collected: 04/01/21 0009    Specimen: Blood Updated: 04/01/21 0115     Extra Tube Hold for add-ons.     Comment: Auto  resulted.       Comprehensive Metabolic Panel [[id number]]  (Abnormal) Collected: 04/01/21 0000    Specimen: Blood from Arm, Left Updated: 04/01/21 0032     Glucose 386 mg/dL      BUN 7 mg/dL      Creatinine 0.67 mg/dL      Sodium 138 mmol/L      Potassium 3.6 mmol/L      Comment: Slight hemolysis detected by analyzer. Results may be affected.        Chloride 101 mmol/L      CO2 25.0 mmol/L      Calcium 9.0 mg/dL      Total Protein 7.1 g/dL      Albumin 3.30 g/dL      ALT (SGPT) 27 U/L      AST (SGOT) 26 U/L      Comment: Slight hemolysis detected by analyzer. Results may be affected.        Alkaline Phosphatase 80 U/L      Total Bilirubin 0.2 mg/dL      eGFR Non African Amer 130 mL/min/1.73      Globulin 3.8 gm/dL      A/G Ratio 0.9 g/dL      BUN/Creatinine Ratio 10.4     Anion Gap 12.0 mmol/L     Narrative:      GFR Normal >60  Chronic Kidney Disease <60  Kidney Failure <15      Troponin [[id number]]  (Normal) Collected: 04/01/21 0000    Specimen: Blood from Arm, Left Updated: 04/01/21 0030     Troponin T <0.010 ng/mL     Narrative:      Troponin T Reference Range:  <= 0.03 ng/mL-   Negative for AMI  >0.03 ng/mL-     Abnormal for myocardial necrosis.  Clinicians would have to utilize clinical acumen, EKG, Troponin and serial changes to determine if it is an Acute Myocardial Infarction or myocardial injury due to an underlying chronic condition.       Results may be falsely decreased if patient taking Biotin.      BNP [[id number]]  (Normal) Collected: 04/01/21 0000    Specimen: Blood from Arm, Left Updated: 04/01/21 0028     proBNP 45.3 pg/mL     Narrative:      Among patients with dyspnea, NT-proBNP is highly sensitive for the detection of acute congestive heart failure. In addition NT-proBNP of <300 pg/ml effectively rules out acute congestive heart failure with 99% negative predictive value.    Results may be falsely decreased if patient taking Biotin.      CBC & Differential [[id number]]  (Abnormal) Collected:  04/01/21 0000    Specimen: Blood from Arm, Left Updated: 04/01/21 0016    Narrative:      The following orders were created for panel order CBC & Differential.  Procedure                               Abnormality         Status                     ---------                               -----------         ------                     CBC Auto Differential[[id number]]        Abnormal            Final result                 Please view results for these tests on the individual orders.    CBC Auto Differential [[id number]]  (Abnormal) Collected: 04/01/21 0000    Specimen: Blood from Arm, Left Updated: 04/01/21 0016     WBC 7.43 10*3/mm3      RBC 4.97 10*6/mm3      Hemoglobin 13.9 g/dL      Hematocrit 41.0 %      MCV 82.5 fL      MCH 28.0 pg      MCHC 33.9 g/dL      RDW 13.8 %      RDW-SD 41.5 fl      MPV 9.1 fL      Platelets 187 10*3/mm3      Neutrophil % 61.6 %      Lymphocyte % 24.6 %      Monocyte % 8.3 %      Eosinophil % 3.1 %      Basophil % 0.7 %      Immature Grans % 1.7 %      Neutrophils, Absolute 4.57 10*3/mm3      Lymphocytes, Absolute 1.83 10*3/mm3      Monocytes, Absolute 0.62 10*3/mm3      Eosinophils, Absolute 0.23 10*3/mm3      Basophils, Absolute 0.05 10*3/mm3      Immature Grans, Absolute 0.13 10*3/mm3      nRBC 0.0 /100 WBC             Chief Complaint on Day of Discharge: chest pain     Hospital Course:  Patient is a 40 y.o. male with past medical history of CAD s/p stent, type 2 DM, ADOLFO, atrial fibrillation, chronic pulmonary emboli on anticoagulation with Xarelto, COPD, and morbid obesity with BMI of 62 who presented with complaints of chest pain and shortness of breath. CTA chest was done. Unlikely he had a PE given he takes chronic anticoagulation. Cardiology was consulted and recommended medical management. Echo was done. Cardiology added Norvasc and recommended Holter monitor as patient stated that his HR can go to 130s. He will discharged home in stable condition and will get monitor placed  "today.     Condition on Discharge:  stable    Physical Exam on Discharge:  /68 (BP Location: Right arm, Patient Position: Sitting)   Pulse 70   Temp 97.8 °F (36.6 °C) (Temporal)   Resp 18   Ht 180.3 cm (70.98\")   Wt (!) 202 kg (446 lb)   SpO2 96%   BMI 62.23 kg/m²   Physical Exam  Vitals reviewed.   Constitutional:       General: He is not in acute distress.     Appearance: He is well-developed.      Comments: obese   HENT:      Head: Normocephalic and atraumatic.      Nose: Nose normal.   Eyes:      Conjunctiva/sclera: Conjunctivae normal.   Cardiovascular:      Rate and Rhythm: Normal rate and regular rhythm.   Pulmonary:      Effort: Pulmonary effort is normal. No respiratory distress.      Breath sounds: Normal breath sounds. No wheezing or rales.   Musculoskeletal:         General: Normal range of motion.      Cervical back: Normal range of motion and neck supple.   Skin:     General: Skin is warm and dry.   Neurological:      Mental Status: He is alert and oriented to person, place, and time.   Psychiatric:         Behavior: Behavior normal.           Discharge Disposition:  Home or Self Care    Discharge Medications:     Discharge Medications      New Medications      Instructions Start Date   amLODIPine 5 MG tablet  Commonly known as: NORVASC   5 mg, Oral, Every 24 Hours Scheduled         Changes to Medications      Instructions Start Date   insulin lispro 100 UNIT/ML injection  Commonly known as: HumaLOG  What changed: how much to take   15 Units, Subcutaneous, 3 Times Daily Before Meals         Continue These Medications      Instructions Start Date   albuterol sulfate  (90 Base) MCG/ACT inhaler  Commonly known as: PROVENTIL HFA;VENTOLIN HFA;PROAIR HFA   2 puffs, Inhalation, Every 4 Hours PRN      atorvastatin 80 MG tablet  Commonly known as: LIPITOR   80 mg, Oral, Nightly      dilTIAZem  MG 24 hr capsule  Commonly known as: Cardizem CD   120 mg, Oral, Daily      fenofibrate " "micronized 134 MG capsule  Commonly known as: LOFIBRA   134 mg, Oral, Every Morning Before Breakfast      gabapentin 300 MG capsule  Commonly known as: NEURONTIN   600 mg, Oral, 2 Times Daily - RT      glucose blood test strip   Use as instructed      glucose monitor monitoring kit   1 each, Does not apply, As Needed      insulin glargine 100 UNIT/ML injection  Commonly known as: LANTUS, SEMGLEE   60 Units, Subcutaneous, Nightly      isosorbide mononitrate 120 MG 24 hr tablet  Commonly known as: IMDUR   120 mg, Oral, Every Morning      lisinopril 40 MG tablet  Commonly known as: PRINIVIL,ZESTRIL   40 mg, Oral, Nightly      methocarbamol 500 MG tablet  Commonly known as: Robaxin   500 mg, Oral, 4 Times Daily      metoprolol succinate XL 50 MG 24 hr tablet  Commonly known as: TOPROL-XL   50 mg, Oral, Daily      Microlet Lancets misc   One touch delica lancets      nitroglycerin 0.4 MG SL tablet  Commonly known as: NITROSTAT   0.4 mg, Sublingual, Every 5 Minutes PRN      pantoprazole 40 MG EC tablet  Commonly known as: PROTONIX   40 mg, Oral, Nightly      pramipexole 1 MG tablet  Commonly known as: MIRAPEX   2 mg, Oral, Nightly      prasugrel 10 MG tablet  Commonly known as: EFFIENT   10 mg, Oral, Daily      ranolazine 1000 MG 12 hr tablet  Commonly known as: RANEXA   1,000 mg, Oral, Every 12 Hours Scheduled      ReliOn Insulin Syringe 31G X 15/64\" 0.5 ML misc  Generic drug: Insulin Syringe-Needle U-100   For use with insulin      rivaroxaban 20 MG tablet  Commonly known as: XARELTO   20 mg, Oral, Daily With Dinner      SITagliptin 100 MG tablet  Commonly known as: Januvia   100 mg, Oral, Daily      traZODone 300 MG tablet  Commonly known as: DESYREL   300 mg, Oral, Every Night at Bedtime             Discharge Diet:   Diet Instructions     Diet: Cardiac      Discharge Diet: Cardiac          Activity at Discharge:   Activity Instructions     Activity as Tolerated            Discharge Care Plan/Instructions: follow up " with PCP, cardiology     Follow-up Appointments:   Future Appointments   Date Time Provider Department [place]   4/2/2021  2:45 PM [facility] EKG/HOLTER CARD [facility] HVC CARD [place]   4/6/2021  2:00 PM Mami Perez APRN [facility] PC POW [facility]   4/8/2021 11:30 AM Karson Ibanez MD [facility] ONC MAD [facility]   4/8/2021 12:45 PM NURSE  MAD BH MAD OPI [facility]   4/16/2021 11:30 AM Negro Serrano MD [facility] SM MAD None   5/6/2021  9:15 AM Demetri Tejada MD [facility] CD POW None       Test Results Pending at Discharge:     Time: 32 min        This document has been electronically signed by Dinah Lin MD on April 2, 2021 14:33 CDT

## 2021-04-02 NOTE — SIGNIFICANT NOTE
04/02/21 0929   OTHER   Discipline occupational therapist;physical therapist   Rehab Time/Intention   Session Not Performed patient/family declined evaluation       Attempting to see pt for initial PT and OT evaluations, however pt reporting he has been moving around room independently and reports he has no concerns and is at his baseline. Pt reporting he is hoping to go home later and does not require therapy eval at this time. Will complete PT orders.

## 2021-04-02 NOTE — PLAN OF CARE
Goal Outcome Evaluation:     Progress: no change  Outcome Summary: patient complained of chest pain at the beginning of shift; Morphine given once; no further complaints; vitals stable; patient rested well throughout shift; wore cpap afte rmidnight

## 2021-04-02 NOTE — PROGRESS NOTES
"[facility] Progress Note   LOS: 0 days   Patient Care Team:  Mami Perez APRN as PCP - General (Family Medicine)  Gonsalo Hogue MD as Consulting Physician (Hospitalist)    Chief Complaint:    Chief Complaint   Patient presents with   • Chest Pain        Subjective     Interval History:   Patient Denies: no complaints today  Patient Complaints: no complaints today  History taken from: patient    No acute events overnight.  Vital signs stable and blood pressure has improved and is now at 112/61.  He denies any further episodes and reports chest pain has resolved.  States feels well and requesting to go home today.    Objective     Vital Sign Min/Max for last 24 hours  Temp  Min: 96.4 °F (35.8 °C)  Max: 98 °F (36.7 °C)   BP  Min: 107/66  Max: 129/64   Pulse  Min: 59  Max: 75   Resp  Min: 18  Max: 22   SpO2  Min: 93 %  Max: 100 %   No data recorded   Weight  Min: 202 kg (445 lb 5.3 oz)  Max: 202 kg (446 lb)     Flowsheet Rows      First Filed Value   Admission Height  180.3 cm (71\") Documented at 04/01/2021 0022   Admission Weight  (!) 195 kg (430 lb) Documented at 04/01/2021 0022            04/01/21  0201 04/01/21  1000 04/02/21  0500   Weight: (!) 202 kg (445 lb 6.4 oz) (!) 202 kg (445 lb 5.3 oz) (!) 202 kg (446 lb)       Physical Exam:  Physical Exam  Vitals and nursing note reviewed.   Constitutional:       General: He is not in acute distress.     Appearance: He is morbidly obese. He is not ill-appearing, toxic-appearing or diaphoretic.   HENT:      Head: Normocephalic and atraumatic.      Right Ear: External ear normal.      Left Ear: External ear normal.   Eyes:      General: Lids are normal.      Conjunctiva/sclera: Conjunctivae normal.      Pupils: Pupils are equal, round, and reactive to light.   Cardiovascular:      Rate and Rhythm: Normal rate and regular rhythm.      Chest Wall: PMI is not displaced.      Heart sounds: Normal heart sounds, S1 normal and S2 normal. No murmur heard.   No " friction rub. No gallop. No S3 or S4 sounds.    Pulmonary:      Effort: Pulmonary effort is normal. No respiratory distress.      Breath sounds: Normal breath sounds. No stridor. No wheezing or rales.   Chest:      Chest wall: No tenderness.   Abdominal:      General: Bowel sounds are normal. There is no distension.      Palpations: Abdomen is soft.      Tenderness: There is no abdominal tenderness.   Musculoskeletal:      Cervical back: Normal range of motion and neck supple.      Right lower leg: No edema.      Left lower leg: No edema.   Skin:     General: Skin is warm and dry.      Capillary Refill: Capillary refill takes 2 to 3 seconds.      Findings: No erythema or rash.   Neurological:      Mental Status: He is alert and oriented to person, place, and time.      Cranial Nerves: No cranial nerve deficit.      Deep Tendon Reflexes: Reflexes are normal and symmetric. Reflexes normal.   Psychiatric:         Behavior: Behavior normal.         Thought Content: Thought content normal.         Judgment: Judgment normal.          Results Review:     Results from last 7 days   Lab Units 04/02/21  0844 04/01/21  0000   SODIUM mmol/L 133* 138   POTASSIUM mmol/L 4.7 3.6   CHLORIDE mmol/L 102 101   CO2 mmol/L 21.0* 25.0   BUN mg/dL 10 7   CREATININE mg/dL 0.76 0.67*   CALCIUM mg/dL 9.0 9.0   BILIRUBIN mg/dL  --  0.2   ALK PHOS U/L  --  80   ALT (SGPT) U/L  --  27   AST (SGOT) U/L  --  26   GLUCOSE mg/dL 303* 386*       Estimated Creatinine Clearance: 225.7 mL/min (by C-G formula based on SCr of 0.76 mg/dL).    Results from last 7 days   Lab Units 04/01/21  0000   MAGNESIUM mg/dL 1.8             Results from last 7 days   Lab Units 04/02/21  0844 04/01/21  0000   WBC 10*3/mm3 8.05 7.43   HEMOGLOBIN g/dL 13.2 13.9   PLATELETS 10*3/mm3 196 187           Lab Results   Component Value Date    PROBNP 45.3 04/01/2021       I/O last 3 completed shifts:  In: 1220 [P.O.:720; IV Piggyback:500]  Out: 1900  [Urine:1900]    Cardiographics:  ECG/EMG Results (last 24 hours)     Procedure Component Value Units Date/Time    ECG 12 Lead [[id number]] Collected: 04/02/21 0852     Updated: 04/02/21 0901     QT Interval 418 ms      QTC Interval 460 ms     Narrative:      Test Reason : cad  Blood Pressure : **/** mmHG  Vent. Rate : 073 BPM     Atrial Rate : 073 BPM     P-R Int : 160 ms          QRS Dur : 116 ms      QT Int : 418 ms       P-R-T Axes : 053 065 064 degrees     QTc Int : 460 ms    Normal sinus rhythm  Normal ECG  When compared with ECG of 02-APR-2021 08:52,  No significant change was found    Referred By:             Confirmed By:         Results for orders placed during the hospital encounter of 03/31/21    Adult Transthoracic Echo Complete W/ Cont if Necessary Per Protocol    Interpretation Summary  · Left ventricular ejection fraction appears to be 61 - 65%. Left ventricular systolic function is normal.  · The right ventricular cavity is mildly dilated.  · Left ventricular diastolic function was normal.      Imaging Results (Most Recent)     Procedure Component Value Units Date/Time    CT Angiogram Chest [[id number]] Collected: 04/01/21 1151     Updated: 04/01/21 1230    Narrative:        PROCEDURE: CT -CTA Thorax/PE with contrast     REASON FOR EXAM: PE suspected, low/intermediate prob, positive  D-dimer, R07.9 Chest pain, unspecified I48.0 Paroxysmal atrial  fibrillation    FINDINGS: Comparison study dated  February 20, 2019. Axial  computer tomography sequential imaging was performed from the  thoracic inlet through the diaphragms after administration of IV  contrast .3-D chest sagittal and coronal reformates was  performed. This exam was performed according to our departmental  dose optimization program, which includes automated exposure  control, adjustment of the mA and/or KV according to patient size  and/or use of iterative reconstruction technique.     Pulmonary arterial evaluation is limited secondary to  suboptimal  injection. The central pulmonary arteries appear normal with no  filling defect identified to suggest pulmonary embolus. The more  distal pulmonary arteries cannot be evaluated secondary to  suboptimal injection technique. If clinically concerned of PE,  consider repeat CT PE study or nuclear medicine ventilation  perfusion exam to further evaluate. Mediastinal structures of the  chest are otherwise normal. There is no lymphadenopathy or  pericardial effusion. Lungs are clear. Pleural spaces are normal.  Visualized upper abdominal structures are unremarkable. No acute  osseous abnormality.      Impression:      1.Pulmonary arterial evaluation is limited secondary to  suboptimal injection. The central pulmonary arteries appear  normal with no filling defect identified to suggest pulmonary  embolus. The more distal pulmonary arteries cannot be evaluated  secondary to suboptimal injection technique. If clinically  concerned of PE, consider repeat CT PE study or nuclear medicine  ventilation perfusion exam to further evaluate.   2. Otherwise unremarkable CT chest exam.    Electronically signed by:  Alvaro Ojeda MD  4/1/2021 12:29 PM CDT  Workstation: [id number]    CT Head Without Contrast [[id number]] Collected: 04/01/21 0008     Updated: 04/01/21 0027    Narrative:      CLINICAL HISTORY: Headache    COMPARISON: January 19, 2021    TECHNIQUE: This exam was performed according to our departmental  dose-optimization program, which includes automated exposure  control, adjustment of the mA and/or KV according to the  patient's size and/or use of iterative reconstruction technique.  MIP reconstructed sagittal and coronal images were also obtained.    FINDINGS: There is no intracranial mass, hemorrhage, edema,  evidence of infarction, midline shift, or extra-axial fluid  collection demonstrated. Bony structures of the face and skull  appear intact. Paranasal sinuses are clear. Middle ear spaces and  mastoid air  cells are clear.      Impression:      No acute change since January 19, 2021.          Thank you for allowing us to participate in the care of your  patient.    Electronically signed by:  Julito Tyson DO  4/1/2021 12:26  AM [employer] Workstation: [id number]    XR Chest 1 View [[id number]] Collected: 04/01/21 0000     Updated: 04/01/21 0021    Narrative:      PORTABLE CHEST X-RAY    CLINICAL HISTORY: chest pain, dyspnea    COMPARISON:  1/19/2021.    FINDINGS:  Single portable view of the chest obtained.  There are  various overlying monitor leads/artifacts in place. The lungs are  well expanded and clear where they can be visualized.  Cardiac  size is within normal limits.  Vascularity is normal considering  technique.  No pleural fluid is demonstrated by portable imaging.   Chronic left rib deformities are again noted.        Impression:        No active disease by portable imaging.    Electronically signed by:  Mikey Grace MD  4/1/2021 12:20 AM  [employer] Workstation: [id number]          CT Head Without Contrast    Result Date: 4/1/2021  No acute change since January 19, 2021. Thank you for allowing us to participate in the care of your patient. Electronically signed by:  Julito Tyson DO  4/1/2021 12:26 AM CDT Workstation: [id number]    XR Chest 1 View    Result Date: 4/1/2021  No active disease by portable imaging. Electronically signed by:  Mikey Grace MD  4/1/2021 12:20 AM [employer] Workstation: [id number]    CT Angiogram Chest    Result Date: 4/1/2021  1.Pulmonary arterial evaluation is limited secondary to suboptimal injection. The central pulmonary arteries appear normal with no filling defect identified to suggest pulmonary embolus. The more distal pulmonary arteries cannot be evaluated secondary to suboptimal injection technique. If clinically concerned of PE, consider repeat CT PE study or nuclear medicine ventilation perfusion exam to further evaluate. 2. Otherwise unremarkable CT chest exam.  Electronically signed by:  Alvaro Ojeda MD  4/1/2021 12:29 PM CDT Workstation: [id number]      Medication Review:     Current Facility-Administered Medications:   •  acetaminophen (TYLENOL) tablet 650 mg, 650 mg, Oral, Q4H PRN, Judy Crews MD, 650 mg at 04/01/21 1603  •  amLODIPine (NORVASC) tablet 5 mg, 5 mg, Oral, Q24H, Amber Mcdaniel APRN, 5 mg at 04/02/21 0823  •  atorvastatin (LIPITOR) tablet 80 mg, 80 mg, Oral, Nightly, Judy Crews MD, 80 mg at 04/01/21 2045  •  butalbital-acetaminophen-caffeine (FIORICET, ESGIC) -40 MG per tablet 1 tablet, 1 tablet, Oral, Q4H PRN, Dinah Lin MD, 1 tablet at 04/01/21 1828  •  dextrose (D50W) 25 g/ 50mL Intravenous Solution 25 g, 25 g, Intravenous, Q15 Min PRN, Judy Crews MD  •  dextrose (GLUTOSE) oral gel 15 g, 15 g, Oral, Q15 Min PRN, Judy Crews MD  •  dilTIAZem CD (CARDIZEM CD) 24 hr capsule 120 mg, 120 mg, Oral, Daily, Judy Crews MD, 120 mg at 04/02/21 0823  •  fenofibrate (TRICOR) tablet 145 mg, 145 mg, Oral, Daily, Judy Crews MD, 145 mg at 04/02/21 0821  •  gabapentin (NEURONTIN) capsule 600 mg, 600 mg, Oral, BID - RT, Judy Crews MD, 600 mg at 04/02/21 0822  •  glucagon (human recombinant) (GLUCAGEN DIAGNOSTIC) injection 1 mg, 1 mg, Subcutaneous, Q15 Min PRN, Juyd Crews MD  •  insulin aspart (novoLOG) injection 0-24 Units, 0-24 Units, Subcutaneous, TID AC, Judy Crews MD, 8 Units at 04/02/21 0818  •  insulin detemir (LEVEMIR) injection 40 Units, 40 Units, Subcutaneous, Q12H, Judy Crews MD, 40 Units at 04/02/21 0816  •  ipratropium-albuterol (DUO-NEB) nebulizer solution 3 mL, 3 mL, Nebulization, Q6H - RT, Judy Crews MD, 3 mL at 04/02/21 0726  •  isosorbide mononitrate (IMDUR) 24 hr tablet 120 mg, 120 mg, Oral, Q24H, Amber Mcdaniel APRN, 120 mg at 04/01/21 0848  •  lidocaine (LIDODERM) 5 % 1 patch, 1 patch, Transdermal, Q24H, Dinah Lin MD,  1 patch at 04/02/21 0821  •  linagliptin (TRADJENTA) tablet 5 mg, 5 mg, Oral, Daily, Judy Crews MD, 5 mg at 04/02/21 0823  •  lisinopril (PRINIVIL,ZESTRIL) tablet 40 mg, 40 mg, Oral, Nightly, Judy Crews MD, 40 mg at 04/01/21 0253  •  methocarbamol (ROBAXIN) tablet 500 mg, 500 mg, Oral, 4x Daily, Judy Crews MD, 500 mg at 04/02/21 0821  •  metoprolol succinate XL (TOPROL-XL) 24 hr tablet 50 mg, 50 mg, Oral, Daily, Judy Crews MD, 50 mg at 04/02/21 0823  •  morphine injection 4 mg, 4 mg, Intravenous, Q3H PRN, 4 mg at 04/01/21 2051 **AND** naloxone (NARCAN) injection 0.4 mg, 0.4 mg, Intravenous, Q5 Min PRN, Judy Crews MD  •  nitroglycerin (NITROSTAT) SL tablet 0.4 mg, 0.4 mg, Sublingual, Q5 Min PRN, Judy Crews MD, 0.4 mg at 04/01/21 0010  •  ondansetron (ZOFRAN) injection 4 mg, 4 mg, Intravenous, Q6H PRN, Judy Crews MD  •  pantoprazole (PROTONIX) EC tablet 40 mg, 40 mg, Oral, Nightly, Judy Crews MD, 40 mg at 04/01/21 2045  •  pramipexole (MIRAPEX) tablet 1 mg, 1 mg, Oral, Nightly, Judy Crews MD, 1 mg at 04/01/21 2045  •  prasugrel (EFFIENT) tablet 10 mg, 10 mg, Oral, Daily, Judy Crews MD, 10 mg at 04/02/21 0824  •  ranolazine (RANEXA) 12 hr tablet 1,000 mg, 1,000 mg, Oral, Q12H, Judy Crews MD, 1,000 mg at 04/02/21 0823  •  rivaroxaban (XARELTO) tablet 20 mg, 20 mg, Oral, Daily With Dinner, Judy Crews MD, 20 mg at 04/01/21 1803  •  [COMPLETED] Insert peripheral IV, , , Once **AND** sodium chloride 0.9 % flush 10 mL, 10 mL, Intravenous, PRN, Judy Crews MD  •  sodium chloride 0.9 % flush 10 mL, 10 mL, Intravenous, Q12H, Judy Crews MD, 10 mL at 04/02/21 0824  •  sodium chloride 0.9 % flush 10 mL, 10 mL, Intravenous, PRN, Judy Crews MD  •  traZODone (DESYREL) tablet 300 mg, 300 mg, Oral, Nightly, Judy Crews MD, 300 mg at 04/01/21 2045    Assessment/Plan       Chest pain    GERD  (gastroesophageal reflux disease)    Morbid obesity (CMS/HCC)    Hypertension    Type 2 diabetes mellitus with hyperglycemia, with long-term current use of insulin (CMS/HCC)    Chest pain/ hx of CAD with multiple PCI as described above to LAD.   ACS has been ruled out. The patient's CP has now resolved. The patient's EKG is Normal. Troponin is negative.  The patient is hemodynamically stable. 3 layers of stent in his mid distal LAD.  Recent cardiac cath in September 2020 was with patent stent with mild in-stent restenosis. No further invasive work up or noninvasive stress testing would be recommended at this time. Medical management as discussed below.      -CTA chest limited secondary to suboptimal injection.  However central pulmonary arteries appear normal.  -Echocardiogram showing normal LVEF, RV C is mildly dilated     Continue amlodipine 5 mg  Continue Effient 10 mg  Continue high-intensity statin with atorvastatin 80 mg nightly  Continue antianginal therapy with Imdur 120 mg and Ranexa 1000 mg twice daily  Continue beta-blocker therapy with metoprolol succinate XL 50 mg daily  Continue antihypertensive therapy with lisinopril 40 mg and Cardizem  mg  Discussed with patient about the need for aggressive risk factor modification, including weight loss, dietary changes, and smoking cessation.      A. fib: admitted in October with A. fib with RVR.  Converted sinus rhythm on his own.  [id number] score is 3.  He is already anticoagulation with Xarelto. Currently normal sinus continue metoprolol and Cardizem.   -Holter monitor as an outpatient has been ordered due to patient complaints of palpitations and elevated HR in 130's.      HTN: chronic, continue anti-hypertensive therapy as discussed above.     History of PE: Xarelto      Type 2 diabetes: Continue sliding scale insulin     Hyperlipidemia: Continue atorvastatin     ADOLFO: Patient has compliance with CPAP machine. Wanting to see sleep medicine for  setting adjustments. Follow up with sleep medicine can be arranged.         Yordy Hansen  reports that he quit smoking about 23 years ago. His smoking use included cigarettes. He has a 0.13 pack-year smoking history. His smokeless tobacco use includes snuff.. I have educated him on the risk of diseases from using tobacco products such as cancer, COPD and heart diease.        Plan for disposition:Where: home and When:  today              This document has been electronically signed by LALA Conway on April 2, 2021 09:43 CDT     Electronically signed by LALA Conway, 04/02/21, 9:48 AM CDT.    I personally saw and examined Yordy Hansen after the APRN.  I personally performed a history and physical examination of the patient.  I personally reviewed independent findings and plan of care.  I discussed management with the APRN.  I agree with the APRN's documentation.  This is a 40 y.o. male with:     1.  Coronary artery disease  2.  Diabetes  3.  Hyperlipidemia  5 hypertension  6.  Morbid obesity  7.  Pulmonary embolism  8.  A. fib      Yordy Hansen is a 40 y.o.  very pleasant 39-year-old gentleman with history of hypertension, hyperlipidemia, diabetes with extensive history of coronary artery disease, he has PCI x3 to his LAD.  First 1 was in 2016, in the setting of myocardial infarction, subsequently he had in-stent restenosis in 2017 so he got another layer of stent with a 2.5 x 18 mm Xience, subsequently he was followed by Dr. Briceno and had another PCI to his LAD with a 3.0 Orsiro stent.   Patient was admitted to the hospital again in September with chest pain.  Subsequent nuclear stress test concerning for moderate ischemia anterolateral walls of the underwent repeat cardiac cath.  Cardiac cath in September 2020 showed mild in-stent restenosis with LONNIE-3 flow in the LAD.     Patient had multiple administrations with atypical chest pain last few months.  He had COVID-19 infection  in winter as well.  Patient is presented hospital with an episode of chest pain.  He described the pain as tenderness in character, localized to his substernal and right side of the area it gets reproduced with palpation.  No chest pain on exertion.  Pain has been intermittent.  On my visit patient lying comfortably in bed without any acute distress.       Subj;  No acute issues overnight. Chest pain has resolved.     Vitals:    04/02/21 0713 04/02/21 0726 04/02/21 0737 04/02/21 0849   BP: 112/61      BP Location: Right arm      Patient Position: Lying      Pulse: 64 62 59 72   Resp: 18 18 18    Temp: 97.7 °F (36.5 °C)      TempSrc: Temporal      SpO2: 93% 94% 100%    Weight:       Height:         Labs reviewed.  Troponin negative x2.  D-dimer borderline elevated.  BNP normal     Telemetry reviewed  EKG reviewed  Echo reviewed    CTA reviewed    1.  Chest pain: Resolved  Patient has risk factors of hypertension, hyperlipidemia, diabetes. 3 layers of stent in his mid distal LAD.  Recent cardiac cath in September 2020 was with patent stent with mild in-stent restenosis.  Patient has been ruled out for acute coronary syndrome, EKG without any ischemic changes, troponins have been negative.  Echocardiogram with no regional wall motion abnormalities in the LAD territory with normal LV systolic function.  His chest pain is rather atypical in character.  He is not a good candidate for noninvasive evaluation with a nuclear stress test because of his morbid obesity and previously false positive stress test, with 3 layers of stents a CTA would not be an optimal option either.  I had a long discussion with the patient, since his chest pain is atypical, I would recommend continuing medical management at this point and rule out noncardiac causes for chest pain.  I explained the increased risk of complications, particularly to recurrent exposure to radiation in setting of his morbid obesity as well as increased risk of bleeding,  related to invasive evaluation at this point.  Additionally he already had his 3 Layers of stents and a small caliber distal LAD.  Since patient has been ruled out for acute coronary syndrome I would not recommend further ischemic work-up at this point continue medical management.     Continue Effient/Cardizem/Imdur/Toprol-XL and Ranexa.  Rule out other noncardiac causes for chest pain.  D-dimer was borderline elevated CTA without PE/dissection  Optimal diabetes control     2.  Hypertension: Well-controlled     3.  Pulmonary embolism:  On Xarelto     4.  A. fib:  Was diagnosed with A. fib in October converted to sinus rhythm on his own.  Peter Vascor is 1 is already anticoagulation with Xarelto.     Thank you for the consult.We will sign off at this point.           Electronically signed by Demetri Tejada MD, 04/02/21, 10:02 AM CDT.

## 2021-04-04 LAB
QT INTERVAL: 418 MS
QTC INTERVAL: 460 MS

## 2021-04-05 ENCOUNTER — TRANSITIONAL CARE MANAGEMENT TELEPHONE ENCOUNTER (OUTPATIENT)
Dept: CALL CENTER | Facility: HOSPITAL | Age: 41
End: 2021-04-05

## 2021-04-05 NOTE — OUTREACH NOTE
Call Center TCM Note      Responses   [facility] patient discharged from?  [place]   Does the patient have one of the following disease processes/diagnoses(primary or secondary)?  Other   TCM attempt successful?  Yes   Call start time  1457   Call end time  1501   Discharge diagnosis  Chest pain   Meds reviewed with patient/caregiver?  Yes   Is the patient having any side effects they believe may be caused by any medication additions or changes?  No   Does the patient have all medications ordered at discharge?  Yes   Is the patient taking all medications as directed (includes completed medication regime)?  Yes   Does the patient have a primary care provider?   Yes   Does the patient have an appointment with their PCP within 7 days of discharge?  Yes   Has the patient kept scheduled appointments due by today?  N/A   Comments  Has appt tomorrow with PCP   Has home health visited the patient within 72 hours of discharge?  N/A   Psychosocial issues?  No   Did the patient receive a copy of their discharge instructions?  Yes   Nursing interventions  Reviewed instructions with patient   What is the patient's perception of their health status since discharge?  New symptoms unrelated to diagnosis [Blood sugars are running in the high 200's and 300s.  ]   Is the patient/caregiver able to teach back signs and symptoms related to disease process for when to call PCP?  Yes   Is the patient/caregiver able to teach back signs and symptoms related to disease process for when to call 911?  Yes   Is the patient/caregiver able to teach back the hierarchy of who to call/visit for symptoms/problems? PCP, Specialist, Home health nurse, Urgent Care, ED, 911  Yes   Additional teach back comments  States he is doing ok.  Blood sugars are running high in the 200s and 300s.  He is taking 25 units of humalog with meals and takes 60 units of Lantus at night time.  He was to see and endo  but missed appt due to being in the hospital  and he is going to reschedule it.     TCM call completed?  Yes   Wrap up additional comments  Will message office regarding elevated blood sugars.          Fiona Chris LPN    4/5/2021, 15:04 EDT

## 2021-04-06 ENCOUNTER — OFFICE VISIT (OUTPATIENT)
Dept: FAMILY MEDICINE CLINIC | Facility: CLINIC | Age: 41
End: 2021-04-06

## 2021-04-06 VITALS
OXYGEN SATURATION: 96 % | BODY MASS INDEX: 44.1 KG/M2 | DIASTOLIC BLOOD PRESSURE: 70 MMHG | WEIGHT: 315 LBS | SYSTOLIC BLOOD PRESSURE: 126 MMHG | HEART RATE: 82 BPM | HEIGHT: 71 IN

## 2021-04-06 DIAGNOSIS — Z79.4 TYPE 2 DIABETES MELLITUS WITH DIABETIC NEUROPATHY, WITH LONG-TERM CURRENT USE OF INSULIN (HCC): Chronic | ICD-10-CM

## 2021-04-06 DIAGNOSIS — E11.40 TYPE 2 DIABETES MELLITUS WITH DIABETIC NEUROPATHY, WITH LONG-TERM CURRENT USE OF INSULIN (HCC): Chronic | ICD-10-CM

## 2021-04-06 DIAGNOSIS — E78.5 HYPERLIPIDEMIA, UNSPECIFIED HYPERLIPIDEMIA TYPE: Chronic | ICD-10-CM

## 2021-04-06 DIAGNOSIS — H65.01 NON-RECURRENT ACUTE SEROUS OTITIS MEDIA OF RIGHT EAR: ICD-10-CM

## 2021-04-06 DIAGNOSIS — Z09 HOSPITAL DISCHARGE FOLLOW-UP: Primary | ICD-10-CM

## 2021-04-06 PROCEDURE — 99214 OFFICE O/P EST MOD 30 MIN: CPT | Performed by: NURSE PRACTITIONER

## 2021-04-06 RX ORDER — GABAPENTIN 300 MG/1
600 CAPSULE ORAL 3 TIMES DAILY
Qty: 180 CAPSULE | Refills: 0 | Status: SHIPPED | OUTPATIENT
Start: 2021-04-06 | End: 2021-05-04 | Stop reason: SDUPTHER

## 2021-04-06 RX ORDER — GABAPENTIN 300 MG/1
600 CAPSULE ORAL 3 TIMES DAILY
Qty: 120 CAPSULE | Refills: 0 | Status: SHIPPED | OUTPATIENT
Start: 2021-04-06 | End: 2021-04-06 | Stop reason: SDUPTHER

## 2021-04-06 NOTE — PROGRESS NOTES
CC: Hospital Follow Up Visit (Pt present with SOA and chest pain, CTA and echo were performed, Norvas added to current regimen, holter monitor was placed at discharge, pt still has holter on)      Subjective:  Yordy Hansen is a 40 y.o. male who presents for     This is a hospital follow up. Patient is a 40 y.o. male with past medical history of CAD s/p stent, type 2 DM, ADOLFO, atrial fibrillation, chronic pulmonary emboli on anticoagulation with Xarelto, COPD, and morbid obesity with BMI of 62 who presented to [facility] with complaints of chest pain and shortness of breath. CTA chest was done. Unlikely he had a PE given he takes chronic anticoagulation. Cardiology was consulted and recommended medical management. Echo was done. Cardiology added Norvasc and recommended Holter monitor. Holter monitor was place and pt still has holter on. Pt was d/c'd home in stable condition. Pt will have Holter on until 4/16/2021. Has follow up with Hem/Onc on 4/8/2021. Missed Endocrinology appointment because he was in the hospital. Needs to reschedule this. DM- checks sugars at least twice daily and more if voiding a lot. Ranges 300- 350. Last A1C was 12.40. Denies any low spells. States highest has just read HI on monitor. Has diabetic neuropathy. States still has a lot of pain in the feet and lower extremities. Wondering if this could be increased.         The following portions of the patient's history were reviewed and updated as appropriate: allergies, current medications, past family history, past medical history, past social history, past surgical history and problem list.    Past Medical History:   Diagnosis Date   • CAD (coronary artery disease)    • Chronic back pain    • Chronic pulmonary embolism (CMS/HCC)    • Coronary artery disease    • Diabetes mellitus (CMS/HCC)    • Factor II deficiency (CMS/HCC)    • Fatty liver    • GERD (gastroesophageal reflux disease)    • Hyperlipidemia    • Hypertension     • Morbid obesity (CMS/HCC)    • RLS (restless legs syndrome)    • Seizures (CMS/HCC)    • Sleep apnea          Current Outpatient Medications:   •  albuterol (PROVENTIL HFA;VENTOLIN HFA) 108 (90 BASE) MCG/ACT inhaler, Inhale 2 puffs Every 4 (Four) Hours As Needed for Wheezing., Disp: , Rfl:   •  amLODIPine (NORVASC) 5 MG tablet, Take 1 tablet by mouth Daily., Disp: 30 tablet, Rfl: 0  •  atorvastatin (LIPITOR) 80 MG tablet, Take 1 tablet by mouth Every Night., Disp: 90 tablet, Rfl: 3  •  dilTIAZem CD (Cardizem CD) 120 MG 24 hr capsule, Take 1 capsule by mouth Daily., Disp: 90 capsule, Rfl: 0  •  fenofibrate micronized (LOFIBRA) 134 MG capsule, Take 1 capsule by mouth Every Morning Before Breakfast., Disp: 90 capsule, Rfl: 3  •  gabapentin (NEURONTIN) 300 MG capsule, Take 2 capsules by mouth 3 (Three) Times a Day., Disp: 180 capsule, Rfl: 0  •  glucose blood test strip, Use as instructed, Disp: 100 each, Rfl: 12  •  glucose monitor monitoring kit, 1 each As Needed (check blood glucose 3x daily)., Disp: 1 each, Rfl: 3  •  insulin glargine (LANTUS) 100 UNIT/ML injection, Inject 60 Units under the skin into the appropriate area as directed Every Night., Disp: 50 mL, Rfl: 3  •  insulin lispro (HumaLOG) 100 UNIT/ML injection, Inject 15 Units under the skin into the appropriate area as directed 3 (Three) Times a Day Before Meals. (Patient taking differently: Inject 25 Units under the skin into the appropriate area as directed 3 (Three) Times a Day Before Meals.), Disp: 30 mL, Rfl: 3  •  isosorbide mononitrate (IMDUR) 120 MG 24 hr tablet, Take 1 tablet by mouth Every Morning for 30 days., Disp: 30 tablet, Rfl: 0  •  lisinopril (PRINIVIL,ZESTRIL) 40 MG tablet, Take 1 tablet by mouth Every Night., Disp: 90 tablet, Rfl: 3  •  methocarbamol (Robaxin) 500 MG tablet, Take 1 tablet by mouth 4 (Four) Times a Day., Disp: 56 tablet, Rfl: 3  •  metoprolol succinate XL (TOPROL-XL) 50 MG 24 hr tablet, Take 1 tablet by mouth Daily.,  "Disp: 90 tablet, Rfl: 3  •  Microlet Lancets misc, One touch delica lancets, Disp: 100 each, Rfl: 5  •  nitroglycerin (NITROSTAT) 0.4 MG SL tablet, Place 1 tablet under the tongue Every 5 (Five) Minutes As Needed for Chest Pain for up to 15 days., Disp: 25 tablet, Rfl: 6  •  pantoprazole (PROTONIX) 40 MG EC tablet, Take 1 tablet by mouth Every Night., Disp: 90 tablet, Rfl: 3  •  pramipexole (MIRAPEX) 1 MG tablet, Take 2 tablets by mouth Every Night., Disp: 180 tablet, Rfl: 3  •  prasugrel (EFFIENT) 10 MG tablet, Take 1 tablet by mouth Daily., Disp: 90 tablet, Rfl: 3  •  ranolazine (RANEXA) 1000 MG 12 hr tablet, Take 1 tablet by mouth Every 12 (Twelve) Hours., Disp: 60 tablet, Rfl: 5  •  ReliOn Insulin Syringe 31G X 15/64\" 0.5 ML misc, For use with insulin, Disp: 100 each, Rfl: 5  •  rivaroxaban (XARELTO) 20 MG tablet, Take 1 tablet by mouth Daily With Dinner., Disp: 90 tablet, Rfl: 3  •  SITagliptin (Januvia) 100 MG tablet, Take 1 tablet by mouth Daily., Disp: 30 tablet, Rfl: 5  •  traZODone (DESYREL) 300 MG tablet, Take 1 tablet by mouth every night at bedtime., Disp: 90 tablet, Rfl: 3    Review of Systems    Review of Systems   Constitutional: Negative for fever.   HENT: Negative.    Eyes: Negative.    Respiratory: Positive for shortness of breath.    Cardiovascular: Negative for chest pain, palpitations and leg swelling.   Gastrointestinal: Positive for diarrhea. Negative for vomiting.   Endocrine: Positive for polyuria.   Genitourinary: Negative.    Musculoskeletal: Positive for arthralgias and myalgias.   Skin: Negative.    Allergic/Immunologic: Negative.    Neurological: Positive for numbness.   Hematological: Negative.    Psychiatric/Behavioral: Negative.    All other systems reviewed and are negative.      Objective  Vitals:    04/06/21 1331   BP: 126/70   BP Location: Left arm   Pulse: 82   SpO2: 96%   Weight: (!) 202 kg (446 lb)   Height: 180.3 cm (71\")     Body mass index is 62.2 kg/m².    Physical " Exam    Physical Exam  Vitals and nursing note reviewed.   Constitutional:       General: He is not in acute distress.     Appearance: He is well-developed. He is obese. He is diaphoretic. He is not ill-appearing or toxic-appearing.   HENT:      Head: Normocephalic and atraumatic.      Right Ear: Ear canal and external ear normal.      Left Ear: Tympanic membrane, ear canal and external ear normal.      Ears:      Comments: Rt TM with fluid noted.  Eyes:      General: No scleral icterus.        Right eye: No discharge.         Left eye: No discharge.      Extraocular Movements: Extraocular movements intact.      Conjunctiva/sclera: Conjunctivae normal.   Neck:      Vascular: No carotid bruit.   Cardiovascular:      Rate and Rhythm: Normal rate and regular rhythm.      Pulses: Normal pulses.      Heart sounds: Normal heart sounds. No murmur heard.   No friction rub. No gallop.    Pulmonary:      Effort: Pulmonary effort is normal. No respiratory distress.      Breath sounds: No stridor. No wheezing, rhonchi or rales.      Comments: Breath sounds distant  Chest:      Chest wall: No tenderness.   Abdominal:      General: Bowel sounds are normal. There is no distension.      Palpations: Abdomen is soft. There is no mass.      Tenderness: There is no abdominal tenderness. There is no guarding or rebound.      Hernia: No hernia is present.   Musculoskeletal:      Cervical back: Normal range of motion and neck supple. No rigidity or tenderness.      Right lower leg: No edema.      Left lower leg: No edema.   Lymphadenopathy:      Cervical: No cervical adenopathy.   Skin:     General: Skin is warm.      Capillary Refill: Capillary refill takes less than 2 seconds.      Coloration: Skin is not jaundiced or pale.      Findings: No bruising, erythema, lesion or rash.   Neurological:      General: No focal deficit present.      Mental Status: He is alert and oriented to person, place, and time. Mental status is at baseline.    Psychiatric:         Mood and Affect: Mood normal.         Behavior: Behavior normal.         Thought Content: Thought content normal.         Judgment: Judgment normal.             Diagnoses and all orders for this visit:    1. Hospital discharge follow-up (Primary)    2. Type 2 diabetes mellitus with diabetic neuropathy, with long-term current use of insulin (CMS/[facility])  -     Discontinue: gabapentin (NEURONTIN) 300 MG capsule; Take 2 capsules by mouth 3 (Three) Times a Day.  Dispense: 120 capsule; Refill: 0  -     Hemoglobin A1c  -     gabapentin (NEURONTIN) 300 MG capsule; Take 2 capsules by mouth 3 (Three) Times a Day.  Dispense: 180 capsule; Refill: 0    3. Hyperlipidemia, unspecified hyperlipidemia type  -     Lipid Panel With LDL/HDL Ratio; Future    4. Non-recurrent acute serous otitis media of right ear       Patient advised to keep scheduled follow-up exams with hematology and cardiology.  Patient to call and reschedule endocrinology visit-he missed this secondary to being in the hospital.  I will check an A1c and lipid panel, patient to return fasting for these labs.  If patient's cholesterol remains elevated will prescribe Repatha.  For the diabetes advised to increase his mealtime insulin from 25 units to 35 units per meal and nighttime insulin from 60 units to 70 units.  Encouraged diabetic eye exam.  For the diabetic neuropathy will increase Neurontin to 600 mg p.o. 3 times daily to see if this will help with patient's symptoms.  Advised Flonase nasal spray 2 sprays each nostril daily for the serous otitis, patient states he has this at home.  We will follow-up in 3 months or sooner if needed.  At that time we will perform a complete exam with labs.  Answered all questions.  Patient verbalized understanding of plan of care.    Patient understands the risks associated with this controlled medication, including tolerance and addiction.  he also agrees to only obtain this medication from me, and not  from a another provider, unless that provider is covering for me in my absence.  he also agrees to be compliant in dosing, and not self adjust the dose of medication.  A signed controlled substance agreement is on file, and he has received a controlled substance education sheet at this a previous visit.  he has also signed a consent for treatment with a controlled substance as per [facility] policy. CARY was obtained. I initially only sent in #120 Gabapentin. Corrected and resent #180 to pharmacy.        This document has been electronically signed by LALA Munoz on April 6, 2021 14:48 CDT

## 2021-04-07 ENCOUNTER — LAB (OUTPATIENT)
Dept: LAB | Facility: OTHER | Age: 41
End: 2021-04-07

## 2021-04-07 DIAGNOSIS — Z79.4 TYPE 2 DIABETES MELLITUS WITH HYPERGLYCEMIA, WITH LONG-TERM CURRENT USE OF INSULIN (HCC): ICD-10-CM

## 2021-04-07 DIAGNOSIS — E11.65 TYPE 2 DIABETES MELLITUS WITH HYPERGLYCEMIA, WITH LONG-TERM CURRENT USE OF INSULIN (HCC): ICD-10-CM

## 2021-04-07 DIAGNOSIS — E78.5 HYPERLIPIDEMIA, UNSPECIFIED HYPERLIPIDEMIA TYPE: Chronic | ICD-10-CM

## 2021-04-07 LAB
ALBUMIN UR-MCNC: 49.7 MG/DL
CHOLEST SERPL-MCNC: 170 MG/DL (ref 0–200)
HBA1C MFR BLD: 10.9 % (ref 4.8–5.6)
HDLC SERPL-MCNC: 31 MG/DL (ref 40–60)
LDLC SERPL CALC-MCNC: 87 MG/DL (ref 0–100)
LDLC/HDLC SERPL: 2.46 {RATIO}
TRIGL SERPL-MCNC: 313 MG/DL (ref 0–150)
VLDLC SERPL-MCNC: 52 MG/DL (ref 5–40)

## 2021-04-07 PROCEDURE — 82043 UR ALBUMIN QUANTITATIVE: CPT | Performed by: NURSE PRACTITIONER

## 2021-04-07 PROCEDURE — 36415 COLL VENOUS BLD VENIPUNCTURE: CPT | Performed by: NURSE PRACTITIONER

## 2021-04-07 PROCEDURE — 80061 LIPID PANEL: CPT | Performed by: NURSE PRACTITIONER

## 2021-04-07 PROCEDURE — 83036 HEMOGLOBIN GLYCOSYLATED A1C: CPT | Performed by: NURSE PRACTITIONER

## 2021-04-08 ENCOUNTER — LAB (OUTPATIENT)
Dept: ONCOLOGY | Facility: HOSPITAL | Age: 41
End: 2021-04-08

## 2021-04-08 ENCOUNTER — CONSULT (OUTPATIENT)
Dept: ONCOLOGY | Facility: CLINIC | Age: 41
End: 2021-04-08

## 2021-04-08 VITALS
RESPIRATION RATE: 18 BRPM | HEART RATE: 99 BPM | HEIGHT: 71 IN | BODY MASS INDEX: 44.1 KG/M2 | WEIGHT: 315 LBS | DIASTOLIC BLOOD PRESSURE: 67 MMHG | SYSTOLIC BLOOD PRESSURE: 148 MMHG | TEMPERATURE: 96.1 F

## 2021-04-08 DIAGNOSIS — I48.0 PAROXYSMAL ATRIAL FIBRILLATION (HCC): ICD-10-CM

## 2021-04-08 DIAGNOSIS — E66.01 MORBID OBESITY (HCC): ICD-10-CM

## 2021-04-08 DIAGNOSIS — E78.5 HYPERLIPIDEMIA, UNSPECIFIED HYPERLIPIDEMIA TYPE: Primary | ICD-10-CM

## 2021-04-08 DIAGNOSIS — I27.82 OTHER CHRONIC PULMONARY EMBOLISM WITHOUT ACUTE COR PULMONALE (HCC): Primary | Chronic | ICD-10-CM

## 2021-04-08 DIAGNOSIS — D68.2 FACTOR II DEFICIENCY (HCC): ICD-10-CM

## 2021-04-08 PROCEDURE — 99204 OFFICE O/P NEW MOD 45 MIN: CPT | Performed by: INTERNAL MEDICINE

## 2021-04-08 PROCEDURE — G0463 HOSPITAL OUTPT CLINIC VISIT: HCPCS | Performed by: INTERNAL MEDICINE

## 2021-04-08 RX ORDER — EVOLOCUMAB 140 MG/ML
140 INJECTION, SOLUTION SUBCUTANEOUS
Qty: 2 PEN | Refills: 5 | Status: SHIPPED | OUTPATIENT
Start: 2021-04-08 | End: 2021-09-30 | Stop reason: SDUPTHER

## 2021-04-09 ENCOUNTER — DOCUMENTATION (OUTPATIENT)
Dept: CARDIAC SURGERY | Facility: CLINIC | Age: 41
End: 2021-04-09

## 2021-04-09 ENCOUNTER — HOSPITAL ENCOUNTER (OUTPATIENT)
Dept: GENERAL RADIOLOGY | Facility: HOSPITAL | Age: 41
Discharge: HOME | End: 2021-04-09
Admitting: INTERNAL MEDICINE

## 2021-04-09 ENCOUNTER — HOSPITAL ENCOUNTER (OUTPATIENT)
Dept: NUCLEAR MEDICINE | Facility: HOSPITAL | Age: 41
Discharge: HOME | End: 2021-04-09

## 2021-04-09 ENCOUNTER — READMISSION MANAGEMENT (OUTPATIENT)
Dept: CALL CENTER | Facility: HOSPITAL | Age: 41
End: 2021-04-09

## 2021-04-09 DIAGNOSIS — D68.2 FACTOR II DEFICIENCY (HCC): ICD-10-CM

## 2021-04-09 DIAGNOSIS — I27.82 OTHER CHRONIC PULMONARY EMBOLISM WITHOUT ACUTE COR PULMONALE (HCC): Chronic | ICD-10-CM

## 2021-04-09 DIAGNOSIS — I48.0 PAROXYSMAL ATRIAL FIBRILLATION (HCC): Primary | ICD-10-CM

## 2021-04-09 PROCEDURE — 0 TECHNETIUM ALBUMIN AGGREGATED: Performed by: INTERNAL MEDICINE

## 2021-04-09 PROCEDURE — 78580 LUNG PERFUSION IMAGING: CPT

## 2021-04-09 PROCEDURE — 71046 X-RAY EXAM CHEST 2 VIEWS: CPT

## 2021-04-09 PROCEDURE — A9540 TC99M MAA: HCPCS | Performed by: INTERNAL MEDICINE

## 2021-04-09 RX ORDER — WARFARIN SODIUM 7.5 MG/1
TABLET ORAL
Qty: 30 TABLET | Refills: 0 | Status: SHIPPED | OUTPATIENT
Start: 2021-04-09 | End: 2021-09-02 | Stop reason: ALTCHOICE

## 2021-04-09 RX ADMIN — KIT FOR THE PREPARATION OF TECHNETIUM TC 99M ALBUMIN AGGREGATED 1 DOSE: 2.5 INJECTION, POWDER, FOR SOLUTION INTRAVENOUS at 12:48

## 2021-04-09 NOTE — PROGRESS NOTES
Received referral from Dr Solano to switch pt from Xarelto to Coumadin. Pt is having a VQ scan today but Dr Solano wants him switched regardless of the results due to pt weight. I consulted LALA Arroyo who stated pt should take Xarelto for 3 days with Coumadin and then stop Xarelto for transitioning. Left message for pt to call [facility] to schedule appt, us give him instructions, and call in coumadin. Findings reported by Marianne Skaggs RN.

## 2021-04-09 NOTE — PROGRESS NOTES
I spoke to pt who was aware of plan to switch back to coumadin. Pt was instructed to take Xarelto and coumadin both starting tonight and appt made for Monday to check INR and for teaching; pt verbalized an understanding. Findings reported by Marianne Skaggs RN.

## 2021-04-09 NOTE — OUTREACH NOTE
Medical Week 2 Survey      Responses   [facility] patient discharged from?  [place]   Does the patient have one of the following disease processes/diagnoses(primary or secondary)?  Other   Week 2 attempt successful?  No   Unsuccessful attempts  Attempt 1          Isabell Abraham RN

## 2021-04-11 NOTE — PROGRESS NOTES
"    REASON FOR CONSULTATION:  Pulmonary embolism   Provide an opinion on any further workup or treatment                             REQUESTING PHYSICIAN:  Dinah Lin MD    RECORDS OBTAINED:  Records of the patients history including those obtained from the referring provider were reviewed and summarized in detail.      History of Present Illness     This is a pleasant 40 year old male who was seen in consultation at the request of Dr Lin for evaluation of pulmonary embolism. Patient unfortunately is a poor historian and most of the history was obtained by reviewing old medical records. Patient tells me that he was diagnosed with PE in 2017 and is on xarelto since then.    Patient had had numerous episodes of chest pain and SOB over last 3 years (on average 3-4 episodes each year) and has been to ER for further evaluation of recurrent PE. Subsequently he has had numerous CT scans and all of the CT images have been \"negative\" for large central PE; however, most of the CT chest have been limited by large body size and suboptimal injection timing. Distal pulmonary arteries have not been well visualized in many of these CT scans.     He recently had similar event and was hospitalized.   He has had echo performed which did not show pulmonary hypertension or RV strain.   He had had left heart cath performed, but has not had right heart cath to check for pulmonary hypertension.     He currently is on xarelto and I have been asked to see him for his pulmonary embolism.         Past Medical History:   Diagnosis Date   • CAD (coronary artery disease)    • Chronic back pain    • Chronic pulmonary embolism (CMS/HCC)    • Coronary artery disease    • Diabetes mellitus (CMS/HCC)    • Factor II deficiency (CMS/HCC)    • Fatty liver    • GERD (gastroesophageal reflux disease)    • Hyperlipidemia    • Hypertension    • Morbid obesity (CMS/HCC)    • RLS (restless legs syndrome)    • Seizures (CMS/HCC)    • Sleep apnea  "        Past Surgical History:   Procedure Laterality Date   • CARDIAC CATHETERIZATION N/A 3/15/2017   • CARDIAC CATHETERIZATION N/A 3/15/2017   • CARDIAC CATHETERIZATION N/A 3/1/2018   • CARDIAC CATHETERIZATION N/A 9/2/2020   • CHOLECYSTECTOMY     • CORONARY ANGIOPLASTY WITH STENT PLACEMENT     • MA RT/LT HEART CATHETERS N/A 3/15/2017   • TRANSESOPHAGEAL ECHOCARDIOGRAM ([facility])          Current Outpatient Medications on File Prior to Visit   Medication Sig Dispense Refill   • albuterol (PROVENTIL HFA;VENTOLIN HFA) 108 (90 BASE) MCG/ACT inhaler Inhale 2 puffs Every 4 (Four) Hours As Needed for Wheezing.     • amLODIPine (NORVASC) 5 MG tablet Take 1 tablet by mouth Daily. 30 tablet 0   • atorvastatin (LIPITOR) 80 MG tablet Take 1 tablet by mouth Every Night. 90 tablet 3   • dilTIAZem CD (Cardizem CD) 120 MG 24 hr capsule Take 1 capsule by mouth Daily. 90 capsule 0   • fenofibrate micronized (LOFIBRA) 134 MG capsule Take 1 capsule by mouth Every Morning Before Breakfast. 90 capsule 3   • gabapentin (NEURONTIN) 300 MG capsule Take 2 capsules by mouth 3 (Three) Times a Day. 180 capsule 0   • glucose blood test strip Use as instructed 100 each 12   • glucose monitor monitoring kit 1 each As Needed (check blood glucose 3x daily). 1 each 3   • insulin glargine (LANTUS) 100 UNIT/ML injection Inject 60 Units under the skin into the appropriate area as directed Every Night. 50 mL 3   • insulin lispro (HumaLOG) 100 UNIT/ML injection Inject 15 Units under the skin into the appropriate area as directed 3 (Three) Times a Day Before Meals. (Patient taking differently: Inject 25 Units under the skin into the appropriate area as directed 3 (Three) Times a Day Before Meals.) 30 mL 3   • isosorbide mononitrate (IMDUR) 120 MG 24 hr tablet Take 1 tablet by mouth Every Morning for 30 days. 30 tablet 0   • lisinopril (PRINIVIL,ZESTRIL) 40 MG tablet Take 1 tablet by mouth Every Night. 90 tablet 3   • methocarbamol (Robaxin) 500 MG tablet Take  "1 tablet by mouth 4 (Four) Times a Day. 56 tablet 3   • metoprolol succinate XL (TOPROL-XL) 50 MG 24 hr tablet Take 1 tablet by mouth Daily. 90 tablet 3   • Microlet Lancets misc One touch delica lancets 100 each 5   • nitroglycerin (NITROSTAT) 0.4 MG SL tablet Place 1 tablet under the tongue Every 5 (Five) Minutes As Needed for Chest Pain for up to 15 days. 25 tablet 6   • pantoprazole (PROTONIX) 40 MG EC tablet Take 1 tablet by mouth Every Night. 90 tablet 3   • pramipexole (MIRAPEX) 1 MG tablet Take 2 tablets by mouth Every Night. 180 tablet 3   • prasugrel (EFFIENT) 10 MG tablet Take 1 tablet by mouth Daily. 90 tablet 3   • ranolazine (RANEXA) 1000 MG 12 hr tablet Take 1 tablet by mouth Every 12 (Twelve) Hours. 60 tablet 5   • ReliOn Insulin Syringe 31G X 15/64\" 0.5 ML misc For use with insulin 100 each 5   • SITagliptin (Januvia) 100 MG tablet Take 1 tablet by mouth Daily. 30 tablet 5   • traZODone (DESYREL) 300 MG tablet Take 1 tablet by mouth every night at bedtime. 90 tablet 3     No current facility-administered medications on file prior to visit.        ALLERGIES:    Allergies   Allergen Reactions   • Glipizide Other (See Comments) and Unknown (See Comments)     Slurred Speech  Slurred Speech  Hallucinations, Slurred Speech   • Reglan [Metoclopramide] Anxiety   • Tramadol Other (See Comments)     seizures   • Risperidone Other (See Comments)     Slurred speech  Can't stand, trouble breathing, slurred speech          Social History     Socioeconomic History   • Marital status: Single     Spouse name: Cori   • Number of children: 0   • Years of education: Not on file   • Highest education level: Not on file   Tobacco Use   • Smoking status: Former Smoker     Packs/day: 0.25     Years: 0.50     Pack years: 0.12     Types: Cigarettes     Quit date:      Years since quittin.2   • Smokeless tobacco: Current User     Types: Snuff   Vaping Use   • Vaping Use: Never used   Substance and Sexual Activity " "  • Alcohol use: No   • Drug use: No   • Sexual activity: Not Currently        Family History   Problem Relation Age of Onset   • Heart disease Mother    • Obesity Mother    • Sleep apnea Father    • Cancer Paternal Grandfather             Objective     Vitals:    04/08/21 1128   BP: 148/67   Pulse: 99   Resp: 18   Temp: 96.1 °F (35.6 °C)   Weight: (!) 201 kg (442 lb 12.8 oz)   Height: 180.3 cm (71\")   PainSc: 0-No pain     Current Status 4/8/2021   ECOG score 0       Physical Exam  Vitals and nursing note reviewed.   Constitutional:       Appearance: He is obese.   HENT:      Mouth/Throat:      Mouth: Mucous membranes are moist.      Pharynx: No posterior oropharyngeal erythema.   Cardiovascular:      Rate and Rhythm: Normal rate and regular rhythm.   Pulmonary:      Effort: Pulmonary effort is normal.      Breath sounds: Normal breath sounds.   Abdominal:      General: There is no distension.      Palpations: Abdomen is soft. There is no mass.      Tenderness: There is no abdominal tenderness.   Neurological:      General: No focal deficit present.      Mental Status: He is alert and oriented to person, place, and time. Mental status is at baseline.   Psychiatric:         Mood and Affect: Mood normal.         Behavior: Behavior normal.         Thought Content: Thought content normal.               RECENT LABS:Independently reviewed and summarized  Hematology WBC   Date Value Ref Range Status   04/02/2021 8.05 3.40 - 10.80 10*3/mm3 Final   11/11/2019 8.9 4.0 - 11.0 10*3/uL Final     RBC   Date Value Ref Range Status   04/02/2021 4.71 4.14 - 5.80 10*6/mm3 Final   11/11/2019 5.31 4.73 - 5.49 10*6/uL Final     Hemoglobin   Date Value Ref Range Status   04/02/2021 13.2 13.0 - 17.7 g/dL Final   11/11/2019 14.8 14.4 - 16.6 g/dL Final     Hematocrit   Date Value Ref Range Status   04/02/2021 40.0 37.5 - 51.0 % Final   11/11/2019 44.2 42.9 - 49.1 % Final   09/17/2019 39.8 (L) 42 - 54 % Final     Platelets   Date Value Ref " "Range Status   04/02/2021 196 140 - 450 10*3/mm3 Final   11/11/2019 225 150 - 450 10*3/uL Final   09/17/2019 221 150 - 450 10*9/L Final          Lab Results   Component Value Date    GLUCOSE 303 (H) 04/02/2021    BUN 10 04/02/2021    CREATININE 0.76 04/02/2021    EGFRIFNONA 112 04/02/2021    EGFRIFAFRI 150 09/17/2019    BCR 13.2 04/02/2021    K 4.7 04/02/2021    CO2 21.0 (L) 04/02/2021    CALCIUM 9.0 04/02/2021    ALBUMIN 3.30 (L) 04/01/2021    AST 26 04/01/2021    ALT 27 04/01/2021     Imaging (independently reviewed and summarized):   CT angiogram chest from 4/1/21 reviewed.   Showed limited exam due to suboptimal injection.   No central PE.   Distal pulmonary arteries could not be evaluated due to suboptimal injection technique.     I have reviewed old records and summarized them in HPI as well as assessment and plan section of this note.     Yordy Hansen reports a pain score of 0.  Given his pain assessment as noted, treatment options were discussed and the following options were decided upon as a follow-up plan to address the patient's pain: continuation of current treatment plan for pain.    Patient screened positive for depression based on a PHQ-9 score of 0 on 4/8/2021. Follow-up recommendations include: Suicide Risk Assessment performed.        Diagnosis:   (1) History of pulmonary embolism   (2) Heterozygous prothrombin gene mutation   (3) Morbid obesity     Assessment/Plan     (1) History of pulmonary embolism (2) Heterozygous prothrombin gene mutation     · Reviewed his case at length.    · He has history of pulmonary embolism which was diagnosed in April 2017. He has heterozygous prothrombin gene mutation. He also has morbid obesity and atrial fibrillation.   · He is on xarelto for his PE.   · He has numerous hospital admissions with chest pain, SOB over last few years. Subsequently CT chest has been \"negative\" for large central PE; however, most of the CT chest have been limited by large body size " "and suboptimal injection timing. Distal pulmonary arteries have not been well visualized in many of these CT scans.   · He was recently admitted to hospital again with chest pain and SOB.  D dimer was elevated. CT angio - again \"negative\" for large PE. Distal arteries not well visualized.   · His echo was unremarkable. At least no obvious concern for RV strain.   · It is unclear to me if the \"episodes\" are result of PE or his morbid obesity. Patient has not had right heart cath to evaluate for pulmonary hypertension.   · Given limitations of CT angiogram, I recommend V/Q scan to rule out pulmonary embolism. This can also rule out potential CTEPH.   · I usually do not recommend xarelto in patient's who is morbid obese as most of the studies did not included morbidly obese patient. Data are limited on the efficacy and toxicity of direct factor Xa inhibitors in obese individuals. Based on a 2016 review of available literature, the International Society of Hemostasis and Thrombosis (ISTH) recommends avoidance of these agents in individuals with a body mass index (BMI) >40 kg/m2, or weight ?120 kg    Due to this reason, I recommend switching him to coumadin where we can monitor therapeutically whether he is being adequately anticoagulated using INR.    Patient was agreeable to this plan.   I will send referral to coumadin clinic. Goal INR between 2-3.     It is quite possible that his recurrent \"episosdes\" of chest pain and SOB are not result of recurrent PE , but his morbid obesity causing demand ischemic? Angina? Pulmonary hypertension?   However, with his INR being therapeutic range we can be rest assured that these episodes are related to PE.     (3) Morbid obesity   Body mass index is 61.76 kg/m².  Counseled about diet, exercise and weight loss.   Discussed option of weight loss surgery with patient.   He tells me he is hesitant about weight loss surgery as one of his friend had \" bad experience with surgery\".   I " discussed with patient that his current weight and health issues, this type of surgery in combination with life style changes might be the only thing that might help him live longer.   His episodes of chest pain and SOB could be related to his morbid obesity and loosing weight might help some of these symptoms.     I have spent > 45 minutes times face to face with the patient and more than 50% of time was spent on counseling/coordinating care.

## 2021-04-12 ENCOUNTER — DOCUMENTATION (OUTPATIENT)
Dept: CARDIAC SURGERY | Facility: CLINIC | Age: 41
End: 2021-04-12

## 2021-04-12 ENCOUNTER — TELEPHONE (OUTPATIENT)
Dept: ONCOLOGY | Facility: HOSPITAL | Age: 41
End: 2021-04-12

## 2021-04-12 NOTE — TELEPHONE ENCOUNTER
----- Message from Karson Ibanez MD sent at 4/9/2021  7:42 PM CDT -----  Let patient know his scan did not show blood clot.

## 2021-04-13 ENCOUNTER — READMISSION MANAGEMENT (OUTPATIENT)
Dept: CALL CENTER | Facility: HOSPITAL | Age: 41
End: 2021-04-13

## 2021-04-13 ENCOUNTER — DOCUMENTATION (OUTPATIENT)
Dept: CARDIAC SURGERY | Facility: CLINIC | Age: 41
End: 2021-04-13

## 2021-04-13 NOTE — PROGRESS NOTES
Attempted to contact pt unsuccessfully regarding missed appt with [facility] yesterday. I called and spoke to [facility] who states pt did not  warfarin and also that he has not picked up his Xarelto since January. GARY Rizo with Dr Solano notified. Findings reported by Marianne Skaggs RN.

## 2021-04-13 NOTE — OUTREACH NOTE
Medical Week 2 Survey      Responses   [facility] patient discharged from?  [place]   Does the patient have one of the following disease processes/diagnoses(primary or secondary)?  Other   Week 2 attempt successful?  No   Unsuccessful attempts  Attempt 2          Radha Manjarrez RN

## 2021-04-13 NOTE — PROGRESS NOTES
Spoke with Marianne at the [facility].  Patient had an appointment yesterday with them & no showed this appointment.     Per the pharmacy, he also has not picked up his coumadin prescription from the pharmacy.   The pharmacy also stated that patient has not picked up xarelto either or taken it since January.     [facility] has left multiple messages for patient to return their phone call with no response or call back.

## 2021-04-15 ENCOUNTER — TELEMEDICINE (OUTPATIENT)
Dept: FAMILY MEDICINE CLINIC | Facility: TELEHEALTH | Age: 41
End: 2021-04-15

## 2021-04-15 VITALS — TEMPERATURE: 98 F

## 2021-04-15 DIAGNOSIS — J32.9 SINUSITIS, UNSPECIFIED CHRONICITY, UNSPECIFIED LOCATION: Primary | ICD-10-CM

## 2021-04-15 DIAGNOSIS — J45.901 ASTHMA WITH ACUTE EXACERBATION, UNSPECIFIED ASTHMA SEVERITY, UNSPECIFIED WHETHER PERSISTENT: ICD-10-CM

## 2021-04-15 PROBLEM — R00.2 PALPITATIONS: Status: ACTIVE | Noted: 2021-04-15

## 2021-04-15 PROBLEM — F32.A ANXIETY AND DEPRESSION: Status: ACTIVE | Noted: 2019-07-09

## 2021-04-15 PROBLEM — M47.816 OSTEOARTHRITIS OF LUMBAR SPINE: Status: ACTIVE | Noted: 2019-10-26

## 2021-04-15 PROBLEM — M48.05 SPINAL STENOSIS OF THORACOLUMBAR REGION: Status: ACTIVE | Noted: 2019-12-07

## 2021-04-15 PROBLEM — Z86.718 HISTORY OF DVT (DEEP VEIN THROMBOSIS): Status: ACTIVE | Noted: 2020-07-24

## 2021-04-15 PROBLEM — J30.2 SEASONAL ALLERGIES: Status: ACTIVE | Noted: 2019-10-26

## 2021-04-15 PROBLEM — R79.0 LOW MAGNESIUM LEVEL: Status: ACTIVE | Noted: 2021-04-15

## 2021-04-15 PROBLEM — R46.89 NON-COMPLIANT BEHAVIOR: Status: ACTIVE | Noted: 2019-10-26

## 2021-04-15 PROBLEM — Z86.711 HISTORY OF PULMONARY EMBOLISM: Status: ACTIVE | Noted: 2020-07-24

## 2021-04-15 PROBLEM — Z78.9 FAILURE OF OUTPATIENT TREATMENT: Status: ACTIVE | Noted: 2021-04-15

## 2021-04-15 PROBLEM — G43.909 MIGRAINE: Status: ACTIVE | Noted: 2019-10-26

## 2021-04-15 PROBLEM — I10 HYPERTENSION: Status: ACTIVE | Noted: 2020-08-24

## 2021-04-15 PROBLEM — E87.1 HYPONATREMIA: Status: ACTIVE | Noted: 2021-04-15

## 2021-04-15 PROBLEM — E83.39 HYPERPHOSPHATEMIA: Status: ACTIVE | Noted: 2019-12-11

## 2021-04-15 PROBLEM — E78.00 PURE HYPERCHOLESTEROLEMIA: Status: ACTIVE | Noted: 2019-07-30

## 2021-04-15 PROBLEM — Z86.79 HISTORY OF CORONARY ARTERY DISEASE: Status: ACTIVE | Noted: 2019-10-26

## 2021-04-15 PROBLEM — F41.9 ANXIETY AND DEPRESSION: Status: ACTIVE | Noted: 2019-07-09

## 2021-04-15 PROBLEM — G62.9 NEUROPATHY: Status: ACTIVE | Noted: 2019-10-26

## 2021-04-15 PROBLEM — R23.4 INDURATION OF SKIN: Status: ACTIVE | Noted: 2021-04-15

## 2021-04-15 PROCEDURE — 99214 OFFICE O/P EST MOD 30 MIN: CPT | Performed by: NURSE PRACTITIONER

## 2021-04-15 RX ORDER — BENZONATATE 200 MG/1
200 CAPSULE ORAL 3 TIMES DAILY PRN
Qty: 30 CAPSULE | Refills: 0 | Status: SHIPPED | OUTPATIENT
Start: 2021-04-15 | End: 2021-04-25

## 2021-04-15 RX ORDER — AZITHROMYCIN 250 MG/1
TABLET, FILM COATED ORAL
Qty: 6 TABLET | Refills: 0 | Status: SHIPPED | OUTPATIENT
Start: 2021-04-15 | End: 2021-06-12 | Stop reason: HOSPADM

## 2021-04-15 RX ORDER — METHYLPREDNISOLONE 4 MG/1
TABLET ORAL
Qty: 1 EACH | Refills: 0 | Status: SHIPPED | OUTPATIENT
Start: 2021-04-15 | End: 2021-06-12 | Stop reason: HOSPADM

## 2021-04-15 NOTE — PATIENT INSTRUCTIONS
Asthma, Adult    Asthma is a long-term (chronic) condition in which the airways get tight and narrow. The airways are the breathing passages that lead from the nose and mouth down into the lungs. A person with asthma will have times when symptoms get worse. These are called asthma attacks. They can cause coughing, whistling sounds when you breathe (wheezing), shortness of breath, and chest pain. They can make it hard to breathe. There is no cure for asthma, but medicines and lifestyle changes can help control it.  There are many things that can bring on an asthma attack or make asthma symptoms worse (triggers). Common triggers include:  · Mold.  · Dust.  · Cigarette smoke.  · Cockroaches.  · Things that can cause allergy symptoms (allergens). These include animal skin flakes (dander) and pollen from trees or grass.  · Things that pollute the air. These may include household , wood smoke, smog, or chemical odors.  · Cold air, weather changes, and wind.  · Crying or laughing hard.  · Stress.  · Certain medicines or drugs.  · Certain foods such as dried fruit, potato chips, and grape juice.  · Infections, such as a cold or the flu.  · Certain medical conditions or diseases.  · Exercise or tiring activities.  Asthma may be treated with medicines and by staying away from the things that cause asthma attacks. Types of medicines may include:  · Controller medicines. These help prevent asthma symptoms. They are usually taken every day.  · Fast-acting reliever or rescue medicines. These quickly relieve asthma symptoms. They are used as needed and provide short-term relief.  · Allergy medicines if your attacks are brought on by allergens.  · Medicines to help control the body's defense (immune) system.  Follow these instructions at home:  Avoiding triggers in your home  · Change your heating and air conditioning filter often.  · Limit your use of fireplaces and wood stoves.  · Get rid of pests (such as roaches and  mice) and their droppings.  · Throw away plants if you see mold on them.  · Clean your floors. Dust regularly. Use cleaning products that do not smell.  · Have someone vacuum when you are not home. Use a vacuum  with a HEPA filter if possible.  · Replace carpet with wood, tile, or vinyl natalia. Carpet can trap animal skin flakes and dust.  · Use allergy-proof pillows, mattress covers, and box spring covers.  · Wash bed sheets and blankets every week in hot water. Dry them in a dryer.  · Keep your bedroom free of any triggers.  · Avoid pets and keep windows closed when things that cause allergy symptoms are in the air.  · Use blankets that are made of polyester or cotton.  · Clean bathrooms and thomas with bleach. If possible, have someone repaint the walls in these rooms with mold-resistant paint. Keep out of the rooms that are being cleaned and painted.  · Wash your hands often with soap and water. If soap and water are not available, use hand .  · Do not allow anyone to smoke in your home.  General instructions  · Take over-the-counter and prescription medicines only as told by your doctor.  ? Talk with your doctor if you have questions about how or when to take your medicines.  ? Make note if you need to use your medicines more often than usual.  · Do not use any products that contain nicotine or tobacco, such as cigarettes and e-cigarettes. If you need help quitting, ask your doctor.  · Stay away from secondhand smoke.  · Avoid doing things outdoors when allergen counts are high and when air quality is low.  · Wear a ski mask when doing outdoor activities in the winter. The mask should cover your nose and mouth. Exercise indoors on cold days if you can.  · Warm up before you exercise. Take time to cool down after exercise.  · Use a peak flow meter as told by your doctor. A peak flow meter is a tool that measures how well the lungs are working.  · Keep track of the peak flow meter's readings.  Write them down.  · Follow your asthma action plan. This is a written plan for taking care of your asthma and treating your attacks.  · Make sure you get all the shots (vaccines) that your doctor recommends. Ask your doctor about a flu shot and a pneumonia shot.  · Keep all follow-up visits as told by your doctor. This is important.  Contact a doctor if:  · You have wheezing, shortness of breath, or a cough even while taking medicine to prevent attacks.  · The mucus you cough up (sputum) is thicker than usual.  · The mucus you cough up changes from clear or white to yellow, green, gray, or bloody.  · You have problems from the medicine you are taking, such as:  ? A rash.  ? Itching.  ? Swelling.  ? Trouble breathing.  · You need reliever medicines more than 2-3 times a week.  · Your peak flow reading is still at 50-79% of your personal best after following the action plan for 1 hour.  · You have a fever.  Get help right away if:  · You seem to be worse and are not responding to medicine during an asthma attack.  · You are short of breath even at rest.  · You get short of breath when doing very little activity.  · You have trouble eating, drinking, or talking.  · You have chest pain or tightness.  · You have a fast heartbeat.  · Your lips or fingernails start to turn blue.  · You are light-headed or dizzy, or you faint.  · Your peak flow is less than 50% of your personal best.  · You feel too tired to breathe normally.  Summary  · Asthma is a long-term (chronic) condition in which the airways get tight and narrow. An asthma attack can make it hard to breathe.  · Asthma cannot be cured, but medicines and lifestyle changes can help control it.  · Make sure you understand how to avoid triggers and how and when to use your medicines.  This information is not intended to replace advice given to you by your health care provider. Make sure you discuss any questions you have with your health care provider.  Document Revised:  02/20/2020 Document Reviewed: 01/22/2018  Elsevier Patient Education © 2021 [employer]

## 2021-04-15 NOTE — PROGRESS NOTES
Chief Complaint  Cough and Nasal Congestion    Subjective          Yordy Hansen presents to [facility]  Cough, nasal congestion and wheezing that has been worse for the last 3 to 4 days. He reports that he has had Covid twice. He has been using neb treatments without relief. He has been checking his temp and has been afebrile and his O2 sat has been 97-98%. He reports shortness of breath with exertion but is not really that much different from baseline. He reports that he has had some cardiac issues and is wearing a monitor. He states that he does not want to go to Urgent Care because every time he goes, they send him to the ER and he just needs something for his congestion. He denies chest pain currently.    Cough  This is a new problem. The current episode started 1 to 4 weeks ago. The problem has been gradually worsening. The cough is productive of sputum. Associated symptoms include nasal congestion, postnasal drip and wheezing. Pertinent negatives include no chills, ear congestion, headaches, myalgias, rhinorrhea, sore throat or shortness of breath. The symptoms are aggravated by lying down. He has tried ipratropium inhaler for the symptoms. His past medical history is significant for asthma and environmental allergies.       Objective   Vital Signs:   Temp 98 °F (36.7 °C) (Oral)     Physical Exam  Constitutional:       General: He is not in acute distress.     Appearance: He is ill-appearing. He is not toxic-appearing.   HENT:      Nose: Congestion present. No rhinorrhea.      Mouth/Throat:      Pharynx: Uvula midline. Posterior oropharyngeal erythema present. No oropharyngeal exudate.   Eyes:      Conjunctiva/sclera: Conjunctivae normal.   Pulmonary:      Effort: Pulmonary effort is normal.   Skin:     Coloration: Skin is not pale.   Neurological:      Mental Status: He is alert.   Psychiatric:         Speech: Speech normal.         Behavior: Behavior normal.        Result  Review :       Data reviewed: allergies, medications, PMH          Assessment and Plan    Diagnoses and all orders for this visit:    1. Sinusitis, unspecified chronicity, unspecified location (Primary)    2. Asthma with acute exacerbation, unspecified asthma severity, unspecified whether persistent  -     azithromycin (Zithromax Z-Luca) 250 MG tablet; Take 2 tabs on Day 1, then 1 tab daily for 4 additional days  Dispense: 6 tablet; Refill: 0  -     methylPREDNISolone (MEDROL) 4 MG dose pack; Take as directed on package instructions.  Dispense: 1 each; Refill: 0  -     benzonatate (TESSALON) 200 MG capsule; Take 1 capsule by mouth 3 (Three) Times a Day As Needed for Cough for up to 10 days.  Dispense: 30 capsule; Refill: 0      Take antibiotic and medications as discussed until gone. If you develop any shortness of breath that seems worse, you develop any chest pain, high fever, swelling in your extremities or difficulty breathing, go to ER immediately or call EMS. Follow up with PCP in 3-5 days if symptoms have not resolved or worsen.       Follow Up   No follow-ups on file.  Patient was given instructions and counseling regarding his condition or for health maintenance advice. Please see specific information pulled into the AVS if appropriate.

## 2021-04-21 ENCOUNTER — READMISSION MANAGEMENT (OUTPATIENT)
Dept: CALL CENTER | Facility: HOSPITAL | Age: 41
End: 2021-04-21

## 2021-04-21 NOTE — OUTREACH NOTE
Medical Week 3 Survey      Responses   [facility] patient discharged from?  [place]   Does the patient have one of the following disease processes/diagnoses(primary or secondary)?  Other   Week 3 attempt successful?  No   Unsuccessful attempts  Attempt 1          Melissa Cisneros RN

## 2021-04-22 ENCOUNTER — READMISSION MANAGEMENT (OUTPATIENT)
Dept: CALL CENTER | Facility: HOSPITAL | Age: 41
End: 2021-04-22

## 2021-04-22 NOTE — OUTREACH NOTE
Medical Week 3 Survey      Responses   [facility] patient discharged from?  [place]   Does the patient have one of the following disease processes/diagnoses(primary or secondary)?  Other   Week 3 attempt successful?  No   Unsuccessful attempts  Attempt 2          Ritika Zimmerman RN

## 2021-04-26 ENCOUNTER — TELEPHONE (OUTPATIENT)
Dept: CARDIOLOGY | Facility: CLINIC | Age: 41
End: 2021-04-26

## 2021-04-26 NOTE — TELEPHONE ENCOUNTER
----- Message from LALA Conway sent at 4/26/2021  1:52 PM CDT -----  Please let patient know holter monitor results.  Relatively benign Holter monitor with average heart rate of 87 bpm.  No arrhythmias are noted.  Couple of the triggered events were associated with elevated heart rate which only lasted for couple seconds.  Continue current treatment and medications.

## 2021-05-04 DIAGNOSIS — E11.40 TYPE 2 DIABETES MELLITUS WITH DIABETIC NEUROPATHY, WITH LONG-TERM CURRENT USE OF INSULIN (HCC): Chronic | ICD-10-CM

## 2021-05-04 DIAGNOSIS — Z79.4 TYPE 2 DIABETES MELLITUS WITH DIABETIC NEUROPATHY, WITH LONG-TERM CURRENT USE OF INSULIN (HCC): Chronic | ICD-10-CM

## 2021-05-04 RX ORDER — GABAPENTIN 300 MG/1
600 CAPSULE ORAL 3 TIMES DAILY
Qty: 180 CAPSULE | Refills: 0 | Status: SHIPPED | OUTPATIENT
Start: 2021-05-04 | End: 2021-06-03 | Stop reason: SDUPTHER

## 2021-05-05 ENCOUNTER — ANTICOAGULATION VISIT (OUTPATIENT)
Dept: CARDIAC SURGERY | Facility: CLINIC | Age: 41
End: 2021-05-05

## 2021-05-05 NOTE — PROGRESS NOTES
PT DID NOT KEEP APPT WITH [facility]. WILL RESOLVE EPISODE. PT WILL NEED NEW REFERRAL IF HE IS TO BE SEEN IN THE FUTURE. Findings reported by Marianne Skaggs RN.

## 2021-06-03 DIAGNOSIS — Z79.4 TYPE 2 DIABETES MELLITUS WITH DIABETIC NEUROPATHY, WITH LONG-TERM CURRENT USE OF INSULIN (HCC): Chronic | ICD-10-CM

## 2021-06-03 DIAGNOSIS — E11.40 TYPE 2 DIABETES MELLITUS WITH DIABETIC NEUROPATHY, WITH LONG-TERM CURRENT USE OF INSULIN (HCC): Chronic | ICD-10-CM

## 2021-06-03 RX ORDER — GABAPENTIN 300 MG/1
600 CAPSULE ORAL 3 TIMES DAILY
Qty: 180 CAPSULE | Refills: 0 | Status: SHIPPED | OUTPATIENT
Start: 2021-06-03 | End: 2021-07-06 | Stop reason: SDUPTHER

## 2021-06-08 ENCOUNTER — APPOINTMENT (OUTPATIENT)
Dept: GENERAL RADIOLOGY | Facility: HOSPITAL | Age: 41
End: 2021-06-08

## 2021-06-08 ENCOUNTER — APPOINTMENT (OUTPATIENT)
Dept: CT IMAGING | Facility: HOSPITAL | Age: 41
End: 2021-06-08

## 2021-06-08 ENCOUNTER — HOSPITAL ENCOUNTER (INPATIENT)
Facility: HOSPITAL | Age: 41
LOS: 2 days | Discharge: HOME | End: 2021-06-12
Attending: STUDENT IN AN ORGANIZED HEALTH CARE EDUCATION/TRAINING PROGRAM | Admitting: INTERNAL MEDICINE

## 2021-06-08 DIAGNOSIS — J18.9 PNEUMONIA OF RIGHT LOWER LOBE DUE TO INFECTIOUS ORGANISM: Primary | ICD-10-CM

## 2021-06-08 DIAGNOSIS — E11.65 TYPE 2 DIABETES MELLITUS WITH HYPERGLYCEMIA, WITH LONG-TERM CURRENT USE OF INSULIN (HCC): ICD-10-CM

## 2021-06-08 DIAGNOSIS — Z79.4 TYPE 2 DIABETES MELLITUS WITH HYPERGLYCEMIA, WITH LONG-TERM CURRENT USE OF INSULIN (HCC): ICD-10-CM

## 2021-06-08 DIAGNOSIS — Z91.14 NON COMPLIANCE W MEDICATION REGIMEN: ICD-10-CM

## 2021-06-08 LAB
ACETONE BLD QL: NEGATIVE
ALBUMIN SERPL-MCNC: 3.3 G/DL (ref 3.5–5.2)
ALBUMIN/GLOB SERPL: 0.9 G/DL
ALP SERPL-CCNC: 87 U/L (ref 39–117)
ALT SERPL W P-5'-P-CCNC: 20 U/L (ref 1–41)
ANION GAP SERPL CALCULATED.3IONS-SCNC: 16 MMOL/L (ref 5–15)
AST SERPL-CCNC: 24 U/L (ref 1–40)
ATMOSPHERIC PRESS: 747 MMHG
ATMOSPHERIC PRESS: ABNORMAL MM[HG]
BACTERIA UR QL AUTO: NORMAL /HPF
BASE EXCESS BLDV CALC-SCNC: 2.8 MMOL/L (ref 0–2)
BASE EXCESS BLDV CALC-SCNC: 2.8 MMOL/L (ref 0–2)
BASOPHILS # BLD AUTO: 0.02 10*3/MM3 (ref 0–0.2)
BASOPHILS NFR BLD AUTO: 0.3 % (ref 0–1.5)
BDY SITE: ABNORMAL
BDY SITE: ABNORMAL
BILIRUB SERPL-MCNC: 0.4 MG/DL (ref 0–1.2)
BILIRUB UR QL STRIP: NEGATIVE
BUN SERPL-MCNC: 12 MG/DL (ref 6–20)
BUN/CREAT SERPL: 14.6 (ref 7–25)
CALCIUM SPEC-SCNC: 9.2 MG/DL (ref 8.6–10.5)
CHLORIDE SERPL-SCNC: 94 MMOL/L (ref 98–107)
CLARITY UR: CLEAR
CO2 SERPL-SCNC: 19 MMOL/L (ref 22–29)
COHGB MFR BLD: 1.4 % (ref 0–5)
COLOR UR: YELLOW
CREAT SERPL-MCNC: 0.82 MG/DL (ref 0.76–1.27)
D-LACTATE SERPL-SCNC: 1.9 MMOL/L (ref 0.5–2)
DEPRECATED RDW RBC AUTO: 40.4 FL (ref 37–54)
EOSINOPHIL # BLD AUTO: 0.27 10*3/MM3 (ref 0–0.4)
EOSINOPHIL NFR BLD AUTO: 3.6 % (ref 0.3–6.2)
ERYTHROCYTE [DISTWIDTH] IN BLOOD BY AUTOMATED COUNT: 13.9 % (ref 12.3–15.4)
FLUAV RNA RESP QL NAA+PROBE: NOT DETECTED
FLUBV RNA RESP QL NAA+PROBE: NOT DETECTED
GFR SERPL CREATININE-BSD FRML MDRD: 103 ML/MIN/1.73
GLOBULIN UR ELPH-MCNC: 3.8 GM/DL
GLUCOSE SERPL-MCNC: 432 MG/DL (ref 65–99)
GLUCOSE UR STRIP-MCNC: ABNORMAL MG/DL
HCO3 BLDV-SCNC: 28.2 MMOL/L (ref 18–23)
HCO3 BLDV-SCNC: 28.2 MMOL/L (ref 18–23)
HCT VFR BLD AUTO: 41.9 % (ref 37.5–51)
HGB BLD-MCNC: 14.5 G/DL (ref 13–17.7)
HGB BLDA-MCNC: 14.7 G/DL (ref 14–18)
HGB UR QL STRIP.AUTO: NEGATIVE
HOLD SPECIMEN: NORMAL
HYALINE CASTS UR QL AUTO: NORMAL /LPF
IMM GRANULOCYTES # BLD AUTO: 0.08 10*3/MM3 (ref 0–0.05)
IMM GRANULOCYTES NFR BLD AUTO: 1.1 % (ref 0–0.5)
INR PPP: 0.95 (ref 0.8–1.2)
INR PPP: 0.99 (ref 0.8–1.2)
KETONES UR QL STRIP: NEGATIVE
LEUKOCYTE ESTERASE UR QL STRIP.AUTO: NEGATIVE
LYMPHOCYTES # BLD AUTO: 1.38 10*3/MM3 (ref 0.7–3.1)
LYMPHOCYTES NFR BLD AUTO: 18.4 % (ref 19.6–45.3)
Lab: ABNORMAL
MAGNESIUM SERPL-MCNC: 1.5 MG/DL (ref 1.6–2.6)
MCH RBC QN AUTO: 28.1 PG (ref 26.6–33)
MCHC RBC AUTO-ENTMCNC: 34.6 G/DL (ref 31.5–35.7)
MCV RBC AUTO: 81.2 FL (ref 79–97)
METHGB BLD QL: 0.9 % (ref 0–3)
MODALITY: ABNORMAL
MODALITY: ABNORMAL
MONOCYTES # BLD AUTO: 0.76 10*3/MM3 (ref 0.1–0.9)
MONOCYTES NFR BLD AUTO: 10.1 % (ref 5–12)
NEUTROPHILS NFR BLD AUTO: 5 10*3/MM3 (ref 1.7–7)
NEUTROPHILS NFR BLD AUTO: 66.5 % (ref 42.7–76)
NITRITE UR QL STRIP: NEGATIVE
NOTE: ABNORMAL
NRBC BLD AUTO-RTO: 0 /100 WBC (ref 0–0.2)
NT-PROBNP SERPL-MCNC: 46.9 PG/ML (ref 0–450)
OXYHGB MFR BLDV: 90.5 % (ref 45–75)
PCO2 BLDV: 45.3 MM HG (ref 41–51)
PCO2 BLDV: 45.3 MM HG (ref 41–51)
PH BLDV: 7.4 PH UNITS (ref 7.29–7.37)
PH BLDV: 7.4 PH UNITS (ref 7.29–7.37)
PH UR STRIP.AUTO: 5.5 [PH] (ref 5–9)
PLAT MORPH BLD: NORMAL
PLATELET # BLD AUTO: 178 10*3/MM3 (ref 140–450)
PMV BLD AUTO: 9.2 FL (ref 6–12)
PO2 BLDV: 63.5 MM HG (ref 27–53)
PO2 BLDV: 63.5 MM HG (ref 27–53)
POTASSIUM BLDV-SCNC: 4.2 MMOL/L (ref 3.5–5)
POTASSIUM SERPL-SCNC: 4.5 MMOL/L (ref 3.5–5.2)
PROT SERPL-MCNC: 7.1 G/DL (ref 6–8.5)
PROT UR QL STRIP: ABNORMAL
PROTHROMBIN TIME: 13.1 SECONDS (ref 11.1–15.3)
PROTHROMBIN TIME: 13.5 SECONDS (ref 11.1–15.3)
RBC # BLD AUTO: 5.16 10*6/MM3 (ref 4.14–5.8)
RBC # UR: NORMAL /HPF
RBC MORPH BLD: NORMAL
REF LAB TEST METHOD: NORMAL
SAO2 % BLDCOV: 92.7 % (ref 45–75)
SARS-COV-2 RNA RESP QL NAA+PROBE: NOT DETECTED
SODIUM BLDV-SCNC: 134 MMOL/L (ref 136–146)
SODIUM SERPL-SCNC: 129 MMOL/L (ref 136–145)
SP GR UR STRIP: 1.04 (ref 1–1.03)
SQUAMOUS #/AREA URNS HPF: NORMAL /HPF
TROPONIN T SERPL-MCNC: <0.01 NG/ML (ref 0–0.03)
UROBILINOGEN UR QL STRIP: ABNORMAL
VENTILATOR MODE: ABNORMAL
WBC # BLD AUTO: 7.51 10*3/MM3 (ref 3.4–10.8)
WBC MORPH BLD: NORMAL
WBC UR QL AUTO: NORMAL /HPF
WHOLE BLOOD HOLD SPECIMEN: NORMAL

## 2021-06-08 PROCEDURE — 80053 COMPREHEN METABOLIC PANEL: CPT | Performed by: PHYSICIAN ASSISTANT

## 2021-06-08 PROCEDURE — 87040 BLOOD CULTURE FOR BACTERIA: CPT | Performed by: HOSPITALIST

## 2021-06-08 PROCEDURE — 87040 BLOOD CULTURE FOR BACTERIA: CPT | Performed by: PHYSICIAN ASSISTANT

## 2021-06-08 PROCEDURE — 36415 COLL VENOUS BLD VENIPUNCTURE: CPT | Performed by: PHYSICIAN ASSISTANT

## 2021-06-08 PROCEDURE — 87150 DNA/RNA AMPLIFIED PROBE: CPT | Performed by: HOSPITALIST

## 2021-06-08 PROCEDURE — 93005 ELECTROCARDIOGRAM TRACING: CPT | Performed by: STUDENT IN AN ORGANIZED HEALTH CARE EDUCATION/TRAINING PROGRAM

## 2021-06-08 PROCEDURE — 25010000002 MORPHINE PER 10 MG: Performed by: EMERGENCY MEDICINE

## 2021-06-08 PROCEDURE — 87147 CULTURE TYPE IMMUNOLOGIC: CPT | Performed by: HOSPITALIST

## 2021-06-08 PROCEDURE — 93010 ELECTROCARDIOGRAM REPORT: CPT | Performed by: INTERNAL MEDICINE

## 2021-06-08 PROCEDURE — 82805 BLOOD GASES W/O2 SATURATION: CPT

## 2021-06-08 PROCEDURE — 25010000002 MAGNESIUM SULFATE 2 GM/50ML SOLUTION: Performed by: PHYSICIAN ASSISTANT

## 2021-06-08 PROCEDURE — 87636 SARSCOV2 & INF A&B AMP PRB: CPT | Performed by: PHYSICIAN ASSISTANT

## 2021-06-08 PROCEDURE — 94799 UNLISTED PULMONARY SVC/PX: CPT

## 2021-06-08 PROCEDURE — 85610 PROTHROMBIN TIME: CPT | Performed by: HOSPITALIST

## 2021-06-08 PROCEDURE — 84484 ASSAY OF TROPONIN QUANT: CPT | Performed by: HOSPITALIST

## 2021-06-08 PROCEDURE — 82803 BLOOD GASES ANY COMBINATION: CPT | Performed by: PHYSICIAN ASSISTANT

## 2021-06-08 PROCEDURE — 82009 KETONE BODYS QUAL: CPT | Performed by: PHYSICIAN ASSISTANT

## 2021-06-08 PROCEDURE — 94640 AIRWAY INHALATION TREATMENT: CPT

## 2021-06-08 PROCEDURE — 85025 COMPLETE CBC W/AUTO DIFF WBC: CPT | Performed by: PHYSICIAN ASSISTANT

## 2021-06-08 PROCEDURE — 25010000002 CEFTRIAXONE PER 250 MG: Performed by: PHYSICIAN ASSISTANT

## 2021-06-08 PROCEDURE — 25010000002 ENOXAPARIN PER 10 MG: Performed by: PHYSICIAN ASSISTANT

## 2021-06-08 PROCEDURE — 99285 EMERGENCY DEPT VISIT HI MDM: CPT

## 2021-06-08 PROCEDURE — 93005 ELECTROCARDIOGRAM TRACING: CPT | Performed by: PHYSICIAN ASSISTANT

## 2021-06-08 PROCEDURE — 71275 CT ANGIOGRAPHY CHEST: CPT

## 2021-06-08 PROCEDURE — 85610 PROTHROMBIN TIME: CPT | Performed by: PHYSICIAN ASSISTANT

## 2021-06-08 PROCEDURE — 81001 URINALYSIS AUTO W/SCOPE: CPT | Performed by: PHYSICIAN ASSISTANT

## 2021-06-08 PROCEDURE — 0 IOPAMIDOL PER 1 ML: Performed by: EMERGENCY MEDICINE

## 2021-06-08 PROCEDURE — 83880 ASSAY OF NATRIURETIC PEPTIDE: CPT | Performed by: PHYSICIAN ASSISTANT

## 2021-06-08 PROCEDURE — 63710000001 INSULIN REGULAR HUMAN PER 5 UNITS: Performed by: PHYSICIAN ASSISTANT

## 2021-06-08 PROCEDURE — 25010000002 ONDANSETRON PER 1 MG: Performed by: PHYSICIAN ASSISTANT

## 2021-06-08 PROCEDURE — 83735 ASSAY OF MAGNESIUM: CPT | Performed by: PHYSICIAN ASSISTANT

## 2021-06-08 PROCEDURE — G0378 HOSPITAL OBSERVATION PER HR: HCPCS

## 2021-06-08 PROCEDURE — 82820 HEMOGLOBIN-OXYGEN AFFINITY: CPT

## 2021-06-08 PROCEDURE — 85007 BL SMEAR W/DIFF WBC COUNT: CPT | Performed by: PHYSICIAN ASSISTANT

## 2021-06-08 PROCEDURE — 25010000002 AZITHROMYCIN PER 500 MG: Performed by: PHYSICIAN ASSISTANT

## 2021-06-08 PROCEDURE — 84484 ASSAY OF TROPONIN QUANT: CPT | Performed by: PHYSICIAN ASSISTANT

## 2021-06-08 PROCEDURE — 83605 ASSAY OF LACTIC ACID: CPT | Performed by: PHYSICIAN ASSISTANT

## 2021-06-08 PROCEDURE — 63710000001 INSULIN DETEMIR PER 5 UNITS: Performed by: HOSPITALIST

## 2021-06-08 RX ORDER — RANOLAZINE 500 MG/1
1000 TABLET, EXTENDED RELEASE ORAL EVERY 12 HOURS SCHEDULED
Status: DISCONTINUED | OUTPATIENT
Start: 2021-06-08 | End: 2021-06-12 | Stop reason: HOSPADM

## 2021-06-08 RX ORDER — PRAMIPEXOLE DIHYDROCHLORIDE 1 MG/1
2 TABLET ORAL NIGHTLY
Status: DISCONTINUED | OUTPATIENT
Start: 2021-06-08 | End: 2021-06-12 | Stop reason: HOSPADM

## 2021-06-08 RX ORDER — ALBUTEROL SULFATE 2.5 MG/3ML
2.5 SOLUTION RESPIRATORY (INHALATION) EVERY 6 HOURS PRN
Status: DISCONTINUED | OUTPATIENT
Start: 2021-06-08 | End: 2021-06-12 | Stop reason: HOSPADM

## 2021-06-08 RX ORDER — LISINOPRIL 40 MG/1
40 TABLET ORAL NIGHTLY
Status: DISCONTINUED | OUTPATIENT
Start: 2021-06-08 | End: 2021-06-12 | Stop reason: HOSPADM

## 2021-06-08 RX ORDER — GABAPENTIN 300 MG/1
600 CAPSULE ORAL 3 TIMES DAILY
Status: DISCONTINUED | OUTPATIENT
Start: 2021-06-08 | End: 2021-06-12 | Stop reason: HOSPADM

## 2021-06-08 RX ORDER — METHOCARBAMOL 500 MG/1
500 TABLET, FILM COATED ORAL 4 TIMES DAILY
Status: DISCONTINUED | OUTPATIENT
Start: 2021-06-08 | End: 2021-06-12 | Stop reason: HOSPADM

## 2021-06-08 RX ORDER — NICOTINE POLACRILEX 4 MG
15 LOZENGE BUCCAL
Status: DISCONTINUED | OUTPATIENT
Start: 2021-06-08 | End: 2021-06-12 | Stop reason: HOSPADM

## 2021-06-08 RX ORDER — TRAZODONE HYDROCHLORIDE 150 MG/1
300 TABLET ORAL NIGHTLY
Status: DISCONTINUED | OUTPATIENT
Start: 2021-06-08 | End: 2021-06-12 | Stop reason: HOSPADM

## 2021-06-08 RX ORDER — NITROGLYCERIN 0.4 MG/1
0.4 TABLET SUBLINGUAL
Status: DISCONTINUED | OUTPATIENT
Start: 2021-06-08 | End: 2021-06-12 | Stop reason: HOSPADM

## 2021-06-08 RX ORDER — IPRATROPIUM BROMIDE AND ALBUTEROL SULFATE 2.5; .5 MG/3ML; MG/3ML
3 SOLUTION RESPIRATORY (INHALATION) EVERY 4 HOURS PRN
Status: DISCONTINUED | OUTPATIENT
Start: 2021-06-08 | End: 2021-06-12 | Stop reason: HOSPADM

## 2021-06-08 RX ORDER — MAGNESIUM SULFATE HEPTAHYDRATE 40 MG/ML
2 INJECTION, SOLUTION INTRAVENOUS ONCE
Status: COMPLETED | OUTPATIENT
Start: 2021-06-08 | End: 2021-06-08

## 2021-06-08 RX ORDER — PRASUGREL 10 MG/1
10 TABLET, FILM COATED ORAL DAILY
Status: DISCONTINUED | OUTPATIENT
Start: 2021-06-09 | End: 2021-06-12 | Stop reason: HOSPADM

## 2021-06-08 RX ORDER — AMLODIPINE BESYLATE 5 MG/1
5 TABLET ORAL
Status: DISCONTINUED | OUTPATIENT
Start: 2021-06-09 | End: 2021-06-12 | Stop reason: HOSPADM

## 2021-06-08 RX ORDER — PANTOPRAZOLE SODIUM 40 MG/1
40 TABLET, DELAYED RELEASE ORAL NIGHTLY
Status: DISCONTINUED | OUTPATIENT
Start: 2021-06-08 | End: 2021-06-12 | Stop reason: HOSPADM

## 2021-06-08 RX ORDER — SODIUM CHLORIDE 0.9 % (FLUSH) 0.9 %
10 SYRINGE (ML) INJECTION EVERY 12 HOURS SCHEDULED
Status: DISCONTINUED | OUTPATIENT
Start: 2021-06-08 | End: 2021-06-12 | Stop reason: HOSPADM

## 2021-06-08 RX ORDER — METOPROLOL SUCCINATE 50 MG/1
50 TABLET, EXTENDED RELEASE ORAL DAILY
Status: DISCONTINUED | OUTPATIENT
Start: 2021-06-09 | End: 2021-06-12 | Stop reason: HOSPADM

## 2021-06-08 RX ORDER — LABETALOL HYDROCHLORIDE 5 MG/ML
20 INJECTION, SOLUTION INTRAVENOUS ONCE
Status: DISCONTINUED | OUTPATIENT
Start: 2021-06-08 | End: 2021-06-08

## 2021-06-08 RX ORDER — WARFARIN SODIUM 7.5 MG/1
7.5 TABLET ORAL
Status: DISCONTINUED | OUTPATIENT
Start: 2021-06-08 | End: 2021-06-09

## 2021-06-08 RX ORDER — IPRATROPIUM BROMIDE AND ALBUTEROL SULFATE 2.5; .5 MG/3ML; MG/3ML
3 SOLUTION RESPIRATORY (INHALATION)
Status: DISCONTINUED | OUTPATIENT
Start: 2021-06-08 | End: 2021-06-12 | Stop reason: HOSPADM

## 2021-06-08 RX ORDER — DEXTROSE MONOHYDRATE 25 G/50ML
25 INJECTION, SOLUTION INTRAVENOUS
Status: DISCONTINUED | OUTPATIENT
Start: 2021-06-08 | End: 2021-06-12 | Stop reason: HOSPADM

## 2021-06-08 RX ORDER — ISOSORBIDE MONONITRATE 60 MG/1
120 TABLET, EXTENDED RELEASE ORAL EVERY MORNING
Status: DISCONTINUED | OUTPATIENT
Start: 2021-06-09 | End: 2021-06-12 | Stop reason: HOSPADM

## 2021-06-08 RX ORDER — SODIUM CHLORIDE 0.9 % (FLUSH) 0.9 %
10 SYRINGE (ML) INJECTION AS NEEDED
Status: DISCONTINUED | OUTPATIENT
Start: 2021-06-08 | End: 2021-06-12 | Stop reason: HOSPADM

## 2021-06-08 RX ORDER — DILTIAZEM HYDROCHLORIDE 120 MG/1
120 CAPSULE, COATED, EXTENDED RELEASE ORAL DAILY
Status: DISCONTINUED | OUTPATIENT
Start: 2021-06-09 | End: 2021-06-12 | Stop reason: HOSPADM

## 2021-06-08 RX ORDER — ATORVASTATIN CALCIUM 40 MG/1
80 TABLET, FILM COATED ORAL NIGHTLY
Status: DISCONTINUED | OUTPATIENT
Start: 2021-06-08 | End: 2021-06-12 | Stop reason: HOSPADM

## 2021-06-08 RX ORDER — ONDANSETRON 2 MG/ML
4 INJECTION INTRAMUSCULAR; INTRAVENOUS ONCE
Status: COMPLETED | OUTPATIENT
Start: 2021-06-08 | End: 2021-06-08

## 2021-06-08 RX ADMIN — SODIUM CHLORIDE 1000 ML: 900 INJECTION, SOLUTION INTRAVENOUS at 17:53

## 2021-06-08 RX ADMIN — ATORVASTATIN CALCIUM 80 MG: 40 TABLET, FILM COATED ORAL at 22:29

## 2021-06-08 RX ADMIN — PRAMIPEXOLE DIHYDROCHLORIDE 2 MG: 1 TABLET ORAL at 22:27

## 2021-06-08 RX ADMIN — HUMAN INSULIN 10 UNITS: 100 INJECTION, SOLUTION SUBCUTANEOUS at 18:08

## 2021-06-08 RX ADMIN — GABAPENTIN 600 MG: 300 CAPSULE ORAL at 22:28

## 2021-06-08 RX ADMIN — ONDANSETRON 4 MG: 2 INJECTION INTRAMUSCULAR; INTRAVENOUS at 16:24

## 2021-06-08 RX ADMIN — IOPAMIDOL 74 ML: 755 INJECTION, SOLUTION INTRAVENOUS at 17:34

## 2021-06-08 RX ADMIN — IPRATROPIUM BROMIDE AND ALBUTEROL SULFATE 3 ML: 2.5; .5 SOLUTION RESPIRATORY (INHALATION) at 23:18

## 2021-06-08 RX ADMIN — WARFARIN SODIUM 7.5 MG: 7.5 TABLET ORAL at 23:17

## 2021-06-08 RX ADMIN — INSULIN DETEMIR 60 UNITS: 100 INJECTION, SOLUTION SUBCUTANEOUS at 22:26

## 2021-06-08 RX ADMIN — IPRATROPIUM BROMIDE AND ALBUTEROL SULFATE 3 ML: 2.5; .5 SOLUTION RESPIRATORY (INHALATION) at 22:34

## 2021-06-08 RX ADMIN — MORPHINE SULFATE 4 MG: 4 INJECTION INTRAVENOUS at 16:24

## 2021-06-08 RX ADMIN — MAGNESIUM SULFATE HEPTAHYDRATE 2 G: 40 INJECTION, SOLUTION INTRAVENOUS at 18:12

## 2021-06-08 RX ADMIN — METHOCARBAMOL 500 MG: 500 TABLET, FILM COATED ORAL at 22:28

## 2021-06-08 RX ADMIN — TRAZODONE HYDROCHLORIDE 300 MG: 150 TABLET ORAL at 22:27

## 2021-06-08 RX ADMIN — PANTOPRAZOLE SODIUM 40 MG: 40 TABLET, DELAYED RELEASE ORAL at 22:28

## 2021-06-08 RX ADMIN — SODIUM CHLORIDE, PRESERVATIVE FREE 10 ML: 5 INJECTION INTRAVENOUS at 22:32

## 2021-06-08 RX ADMIN — ENOXAPARIN SODIUM 190 MG: 100 INJECTION SUBCUTANEOUS at 20:37

## 2021-06-08 RX ADMIN — SODIUM CHLORIDE, PRESERVATIVE FREE 10 ML: 5 INJECTION INTRAVENOUS at 16:25

## 2021-06-08 RX ADMIN — AZITHROMYCIN MONOHYDRATE 500 MG: 500 INJECTION, POWDER, LYOPHILIZED, FOR SOLUTION INTRAVENOUS at 21:20

## 2021-06-08 RX ADMIN — RANOLAZINE 1000 MG: 500 TABLET, FILM COATED, EXTENDED RELEASE ORAL at 22:28

## 2021-06-08 RX ADMIN — LISINOPRIL 40 MG: 40 TABLET ORAL at 22:28

## 2021-06-08 RX ADMIN — CEFTRIAXONE SODIUM 1 G: 1 INJECTION, POWDER, FOR SOLUTION INTRAMUSCULAR; INTRAVENOUS at 20:39

## 2021-06-08 NOTE — ED PROVIDER NOTES
Subjective   Patient presents to emergency department for chest pain and shortness of breath x 3 days, worsening.  Hx of CAD, prior PE, DM, Factor II deficiency, HERD, hyperlipidemia, hypertension, morbid obesity.  States he has been out of his medications for 3 weeks due to not being able to afford medication.  He was seen at [facility] ED in [place] yesterday and diagnosed with Pleurisy.        History provided by:  Patient   used: No        Review of Systems   Constitutional: Negative for chills and fever.   HENT: Negative for sore throat and trouble swallowing.    Eyes: Negative for visual disturbance.   Respiratory: Positive for shortness of breath. Negative for cough and wheezing.    Cardiovascular: Positive for chest pain. Negative for leg swelling.   Gastrointestinal: Negative for abdominal pain, nausea and vomiting.   Genitourinary: Negative for dysuria and flank pain.   Musculoskeletal: Negative for myalgias.   Skin: Negative for color change.   Neurological: Negative for dizziness, syncope, facial asymmetry, speech difficulty, weakness, numbness and headaches.   Psychiatric/Behavioral: Negative for confusion.       Past Medical History:   Diagnosis Date   • Asthma    • CAD (coronary artery disease)    • Chronic back pain    • Chronic pulmonary embolism (CMS/HCC)    • Coronary artery disease    • Diabetes mellitus (CMS/HCC)    • Factor II deficiency (CMS/HCC)    • Fatty liver    • GERD (gastroesophageal reflux disease)    • Hyperlipidemia    • Hypertension    • Morbid obesity (CMS/HCC)    • RLS (restless legs syndrome)    • Seizures (CMS/HCC)    • Sleep apnea        Allergies   Allergen Reactions   • Glipizide Other (See Comments) and Unknown (See Comments)     Slurred Speech  Slurred Speech  Hallucinations, Slurred Speech   • Reglan [Metoclopramide] Anxiety   • Tramadol Other (See Comments)     seizures   • Risperidone Other (See Comments)     Slurred speech  Can't stand,  "trouble breathing, slurred speech         Past Surgical History:   Procedure Laterality Date   • CARDIAC CATHETERIZATION N/A 3/15/2017   • CARDIAC CATHETERIZATION N/A 3/15/2017   • CARDIAC CATHETERIZATION N/A 3/1/2018   • CARDIAC CATHETERIZATION N/A 2020   • CHOLECYSTECTOMY     • CORONARY ANGIOPLASTY WITH STENT PLACEMENT     • FL RT/LT HEART CATHETERS N/A 3/15/2017   • TRANSESOPHAGEAL ECHOCARDIOGRAM (MONICA)         Family History   Problem Relation Age of Onset   • Heart disease Mother    • Obesity Mother    • Sleep apnea Father    • Cancer Paternal Grandfather        Social History     Socioeconomic History   • Marital status: Single     Spouse name: Cori   • Number of children: 0   • Years of education: Not on file   • Highest education level: Not on file   Tobacco Use   • Smoking status: Former Smoker     Packs/day: 0.25     Years: 0.50     Pack years: 0.12     Types: Cigarettes     Quit date:      Years since quittin.4   • Smokeless tobacco: Current User     Types: Snuff   Vaping Use   • Vaping Use: Never used   Substance and Sexual Activity   • Alcohol use: No   • Drug use: No   • Sexual activity: Not Currently           Objective      /84   Pulse 96   Temp 97.8 °F (36.6 °C) (Infrared)   Resp 26   Ht 180.3 cm (71\")   Wt (!) 197 kg (434 lb 9.6 oz)   SpO2 97%   BMI 60.61 kg/m²     Physical Exam  Vitals and nursing note reviewed.   Constitutional:       General: He is not in acute distress.     Appearance: He is well-developed.   HENT:      Head: Normocephalic and atraumatic.      Mouth/Throat:      Mouth: Mucous membranes are moist.   Eyes:      Conjunctiva/sclera: Conjunctivae normal.   Cardiovascular:      Rate and Rhythm: Normal rate and regular rhythm.      Pulses: Normal pulses.      Heart sounds: Normal heart sounds.   Pulmonary:      Effort: Pulmonary effort is normal. Tachypnea present. No respiratory distress.      Breath sounds: Wheezing present.      Comments: Limited exam due " to increase body habitus    Abdominal:      General: Bowel sounds are normal.      Palpations: Abdomen is soft.   Skin:     General: Skin is warm and dry.      Capillary Refill: Capillary refill takes less than 2 seconds.   Neurological:      Mental Status: He is alert and oriented to person, place, and time.   Psychiatric:         Mood and Affect: Mood normal.         Behavior: Behavior normal.         Thought Content: Thought content normal.         ECG 12 Lead      Date/Time: 6/8/2021 4:08 PM  Performed by: Jamie Banks PA-C  Authorized by: Jamie Banks PA-C   Interpreted by physician  Comparison: compared with previous ECG from 4/2/2021  Similar to previous ECG  Rhythm: sinus tachycardia  Rate: tachycardic  BPM: 116  ST Segments: ST segments normal  Clinical impression: abnormal ECG                 ED Course  ED Course as of Jun 08 1923   Tue Jun 08, 2021   1853 Paged Hospitalist.    [MOIRA]      ED Course User Index  [MOIRA] Jamie Banks PA-C      Results for orders placed or performed during the hospital encounter of 06/08/21   COVID-19 and FLU A/B PCR - Swab, Nasopharynx    Specimen: Nasopharynx; Swab   Result Value Ref Range    COVID19 Not Detected Not Detected - Ref. Range    Influenza A PCR Not Detected Not Detected    Influenza B PCR Not Detected Not Detected   Troponin    Specimen: Blood   Result Value Ref Range    Troponin T <0.010 0.000 - 0.030 ng/mL   Troponin    Specimen: Blood   Result Value Ref Range    Troponin T <0.010 0.000 - 0.030 ng/mL   Comprehensive Metabolic Panel    Specimen: Blood   Result Value Ref Range    Glucose 432 (C) 65 - 99 mg/dL    BUN 12 6 - 20 mg/dL    Creatinine 0.82 0.76 - 1.27 mg/dL    Sodium 129 (L) 136 - 145 mmol/L    Potassium 4.5 3.5 - 5.2 mmol/L    Chloride 94 (L) 98 - 107 mmol/L    CO2 19.0 (L) 22.0 - 29.0 mmol/L    Calcium 9.2 8.6 - 10.5 mg/dL    Total Protein 7.1 6.0 - 8.5 g/dL    Albumin 3.30 (L) 3.50 - 5.20 g/dL    ALT (SGPT) 20 1 - 41 U/L     AST (SGOT) 24 1 - 40 U/L    Alkaline Phosphatase 87 39 - 117 U/L    Total Bilirubin 0.4 0.0 - 1.2 mg/dL    eGFR Non African Amer 103 >60 mL/min/1.73    Globulin 3.8 gm/dL    A/G Ratio 0.9 g/dL    BUN/Creatinine Ratio 14.6 7.0 - 25.0    Anion Gap 16.0 (H) 5.0 - 15.0 mmol/L   BNP    Specimen: Blood   Result Value Ref Range    proBNP 46.9 0.0 - 450.0 pg/mL   Protime-INR    Specimen: Blood   Result Value Ref Range    Protime 13.5 11.1 - 15.3 Seconds    INR 0.99 0.80 - 1.20   CBC Auto Differential    Specimen: Blood   Result Value Ref Range    WBC 7.51 3.40 - 10.80 10*3/mm3    RBC 5.16 4.14 - 5.80 10*6/mm3    Hemoglobin 14.5 13.0 - 17.7 g/dL    Hematocrit 41.9 37.5 - 51.0 %    MCV 81.2 79.0 - 97.0 fL    MCH 28.1 26.6 - 33.0 pg    MCHC 34.6 31.5 - 35.7 g/dL    RDW 13.9 12.3 - 15.4 %    RDW-SD 40.4 37.0 - 54.0 fl    MPV 9.2 6.0 - 12.0 fL    Platelets 178 140 - 450 10*3/mm3    Neutrophil % 66.5 42.7 - 76.0 %    Lymphocyte % 18.4 (L) 19.6 - 45.3 %    Monocyte % 10.1 5.0 - 12.0 %    Eosinophil % 3.6 0.3 - 6.2 %    Basophil % 0.3 0.0 - 1.5 %    Immature Grans % 1.1 (H) 0.0 - 0.5 %    Neutrophils, Absolute 5.00 1.70 - 7.00 10*3/mm3    Lymphocytes, Absolute 1.38 0.70 - 3.10 10*3/mm3    Monocytes, Absolute 0.76 0.10 - 0.90 10*3/mm3    Eosinophils, Absolute 0.27 0.00 - 0.40 10*3/mm3    Basophils, Absolute 0.02 0.00 - 0.20 10*3/mm3    Immature Grans, Absolute 0.08 (H) 0.00 - 0.05 10*3/mm3    nRBC 0.0 0.0 - 0.2 /100 WBC   Scan Slide    Specimen: Blood   Result Value Ref Range    RBC Morphology Normal Normal    WBC Morphology Normal Normal    Platelet Morphology Normal Normal   Acetone    Specimen: Blood   Result Value Ref Range    Acetone Negative Negative   Magnesium    Specimen: Blood   Result Value Ref Range    Magnesium 1.5 (L) 1.6 - 2.6 mg/dL   Urinalysis With Microscopic If Indicated (No Culture) - Urine, Clean Catch    Specimen: Urine, Clean Catch   Result Value Ref Range    Color, UA Yellow Yellow, Straw, Dark Yellow, Suni     Appearance, UA Clear Clear    pH, UA 5.5 5.0 - 9.0    Specific Gravity, UA 1.039 (H) 1.003 - 1.030    Glucose, UA >=1000 mg/dL (3+) (A) Negative    Ketones, UA Negative Negative    Bilirubin, UA Negative Negative    Blood, UA Negative Negative    Protein,  mg/dL (2+) (A) Negative    Leuk Esterase, UA Negative Negative    Nitrite, UA Negative Negative    Urobilinogen, UA 1.0 E.U./dL 0.2 - 1.0 E.U./dL   Blood Gas, Venous -    Specimen: Venous Blood   Result Value Ref Range    Site Venous     pH, Venous 7.403 (H) 7.290 - 7.370 pH Units    pCO2, Venous 45.3 41.0 - 51.0 mm Hg    pO2, Venous 63.5 (H) 27.0 - 53.0 mm Hg    HCO3, Venous 28.2 (H) 18.0 - 23.0 mmol/L    Base Excess, Venous 2.8 (H) 0.0 - 2.0 mmol/L    Barometric Pressure for Blood Gas      Modality N/A    Urinalysis, Microscopic Only - Urine, Clean Catch    Specimen: Urine, Clean Catch   Result Value Ref Range    RBC, UA None Seen None Seen /HPF    WBC, UA 0-2 None Seen, 0-2, 3-5 /HPF    Bacteria, UA None Seen None Seen /HPF    Squamous Epithelial Cells, UA 0-2 None Seen, 0-2 /HPF    Hyaline Casts, UA None Seen None Seen /LPF    Methodology Manual Light Microscopy    Blood Gas, Venous With Co-Ox    Specimen: Venous Blood   Result Value Ref Range    Site OTHER     pH, Venous 7.403 (H) 7.290 - 7.370 pH Units    pCO2, Venous 45.3 41.0 - 51.0 mm Hg    pO2, Venous 63.5 (H) 27.0 - 53.0 mm Hg    HCO3, Venous 28.2 (H) 18.0 - 23.0 mmol/L    Base Excess, Venous 2.8 (H) 0.0 - 2.0 mmol/L    O2 Saturation, Venous 92.7 (H) 45.0 - 75.0 %    Hemoglobin, Blood Gas 14.7 14 - 18 g/dL    Oxyhemoglobin Venous 90.5 (H) 45.0 - 75.0 %    Methemoglobin Venous 0.9 0.0 - 3.0 %    Carboxyhemoglobin Venous 1.4 0.0 - 5.0 %    Sodium, Venous 134 (L) 136 - 146 mmol/L    Potassium, Venous 4.2 3.5 - 5.0 mmol/L    Barometric Pressure for Blood Gas 747 mmHg    Modality Room Air     Ventilator Mode NA     Note      Collected by GARY Pham Top   Result Value Ref Range    Extra Tube  hold for add-on    Green Top (Gel)   Result Value Ref Range    Extra Tube Hold for add-ons.    Lavender Top   Result Value Ref Range    Extra Tube hold for add-on    Gold Top - SST   Result Value Ref Range    Extra Tube Hold for add-ons.    Lavender Top   Result Value Ref Range    Extra Tube hold for add-on    Green Top (Gel)   Result Value Ref Range    Extra Tube Hold for add-ons.      CT Angiogram Chest    Result Date: 2021  Narrative: PROCEDURE: CT ANGIOGRAM CHEST .      EXAM:  Computed Tomography with CTA       REGION:  Chest     INDICATION:   Chest pain, shortness of breath  - rule out pulmonary embolism     CORRELATIVE IMAGIN2021                        TECHNIQUE:           - PE / vascular protocol             - reconstructions:  axial, coronal, sagittal, obliques   - computer-generated 3D reconstructions (MIPS) were performed.            - contrast:  intravenous 74 mL of [id number]                     This exam was performed according to our departmental dose-optimization program, which includes automated exposure control, adjustment of the mA and/or kV according to patient size and/or use of iterative reconstruction technique. DLP is 1967.9           COMMENTS:                           - Pulmonary arterial system:     - Main pulmonary artery trunk:  negative     - Left, right main pulmonary arteries: negative     - Lobar arteries: negative     - Segmental arteries: Limited evaluation of segmental arteries due to contrast phase    - Systemic vascularity (as visualized):      - Aorta:  grossly negative / normal caliber / no dissection      - roots of great vessels:  grossly negative / normal caliber     - SVC / IVC:  grossly negative / normal caliber     - Misc (limited visualization):     - pulmonary parenchyma:  New, patchy airspace opacification in the right lower lobe     - pleura:  negative     - mediastinal / glendy:  negative     - neck, inferior:  grossly wnl     - subdiaphragmatic structures:  Status post cholecystectomy- osseous:  Old healed left-sided rib fractures are present, as previously     - misc:  Small hiatal hernia   .      Impression: 1.  No evidence of pulmonary embolism, with the segmental branches less than optimally evaluated due to contrast phase no large, central filling defect identified. 2.  No evidence of pathology associated with the visualized aorta.    3. New patchy airspace opacification in the right lower lobe, suspicious for pneumonia. Follow-up to complete radiographic resolution recommended Electronically signed by:  Gela Shaffer MD  6/8/2021 5:56 PM CDT Workstation: [id number]                                         [facility]    Final diagnoses:   Pneumonia of right lower lobe due to infectious organism   Type 2 diabetes mellitus with hyperglycemia, with long-term current use of insulin (CMS/[facility])   Non compliance w medication regimen       ED Disposition  ED Disposition     ED Disposition Condition Comment    Decision to Admit  Level of Care: Telemetry [5]   Diagnosis: Pneumonia of right lower lobe due to infectious organism [[id number]]   Admitting Physician: SANDRA KEENAN [[id number]]   Attending Physician: SANDRA KEENAN [[id number]]            No follow-up provider specified.       Medication List      No changes were made to your prescriptions during this visit.          Jamie Banks PA-C  06/08/21 1922       Jamie Banks PA-C  06/08/21 1923

## 2021-06-09 ENCOUNTER — APPOINTMENT (OUTPATIENT)
Dept: ULTRASOUND IMAGING | Facility: HOSPITAL | Age: 41
End: 2021-06-09

## 2021-06-09 ENCOUNTER — APPOINTMENT (OUTPATIENT)
Dept: INTERVENTIONAL RADIOLOGY/VASCULAR | Facility: HOSPITAL | Age: 41
End: 2021-06-09

## 2021-06-09 LAB
ANION GAP SERPL CALCULATED.3IONS-SCNC: 8 MMOL/L (ref 5–15)
B PARAPERT DNA SPEC QL NAA+PROBE: NOT DETECTED
B PERT DNA SPEC QL NAA+PROBE: NOT DETECTED
BACTERIA BLD CULT: ABNORMAL
BACTERIA UR QL AUTO: ABNORMAL /HPF
BASOPHILS # BLD AUTO: 0.04 10*3/MM3 (ref 0–0.2)
BASOPHILS NFR BLD AUTO: 0.5 % (ref 0–1.5)
BILIRUB UR QL STRIP: ABNORMAL
BUN SERPL-MCNC: 15 MG/DL (ref 6–20)
BUN/CREAT SERPL: 8.9 (ref 7–25)
C PNEUM DNA NPH QL NAA+NON-PROBE: NOT DETECTED
CALCIUM SPEC-SCNC: 8.9 MG/DL (ref 8.6–10.5)
CHLORIDE SERPL-SCNC: 97 MMOL/L (ref 98–107)
CLARITY UR: CLEAR
CO2 SERPL-SCNC: 29 MMOL/L (ref 22–29)
COLOR UR: ABNORMAL
CREAT SERPL-MCNC: 1.69 MG/DL (ref 0.76–1.27)
DEPRECATED RDW RBC AUTO: 45 FL (ref 37–54)
EOSINOPHIL # BLD AUTO: 0.32 10*3/MM3 (ref 0–0.4)
EOSINOPHIL NFR BLD AUTO: 4.1 % (ref 0.3–6.2)
ERYTHROCYTE [DISTWIDTH] IN BLOOD BY AUTOMATED COUNT: 14.9 % (ref 12.3–15.4)
FLUAV SUBTYP SPEC NAA+PROBE: NOT DETECTED
FLUBV RNA ISLT QL NAA+PROBE: NOT DETECTED
GFR SERPL CREATININE-BSD FRML MDRD: 45 ML/MIN/1.73
GLUCOSE BLDC GLUCOMTR-MCNC: 129 MG/DL (ref 70–130)
GLUCOSE BLDC GLUCOMTR-MCNC: 215 MG/DL (ref 70–130)
GLUCOSE BLDC GLUCOMTR-MCNC: 256 MG/DL (ref 70–130)
GLUCOSE BLDC GLUCOMTR-MCNC: 291 MG/DL (ref 70–130)
GLUCOSE BLDC GLUCOMTR-MCNC: 300 MG/DL (ref 70–130)
GLUCOSE SERPL-MCNC: 301 MG/DL (ref 65–99)
GLUCOSE UR STRIP-MCNC: ABNORMAL MG/DL
HADV DNA SPEC NAA+PROBE: NOT DETECTED
HBA1C MFR BLD: 11.8 % (ref 4.8–5.6)
HCOV 229E RNA SPEC QL NAA+PROBE: NOT DETECTED
HCOV HKU1 RNA SPEC QL NAA+PROBE: NOT DETECTED
HCOV NL63 RNA SPEC QL NAA+PROBE: NOT DETECTED
HCOV OC43 RNA SPEC QL NAA+PROBE: DETECTED
HCT VFR BLD AUTO: 40.5 % (ref 37.5–51)
HGB BLD-MCNC: 13.5 G/DL (ref 13–17.7)
HGB UR QL STRIP.AUTO: NEGATIVE
HMPV RNA NPH QL NAA+NON-PROBE: NOT DETECTED
HPIV1 RNA SPEC QL NAA+PROBE: NOT DETECTED
HPIV2 RNA SPEC QL NAA+PROBE: NOT DETECTED
HPIV3 RNA NPH QL NAA+PROBE: DETECTED
HPIV4 P GENE NPH QL NAA+PROBE: NOT DETECTED
HYALINE CASTS UR QL AUTO: ABNORMAL /LPF
IMM GRANULOCYTES # BLD AUTO: 0.09 10*3/MM3 (ref 0–0.05)
IMM GRANULOCYTES NFR BLD AUTO: 1.1 % (ref 0–0.5)
INR PPP: 0.97 (ref 0.8–1.2)
KETONES UR QL STRIP: NEGATIVE
L PNEUMO1 AG UR QL IA: NEGATIVE
LEUKOCYTE ESTERASE UR QL STRIP.AUTO: NEGATIVE
LYMPHOCYTES # BLD AUTO: 1.56 10*3/MM3 (ref 0.7–3.1)
LYMPHOCYTES NFR BLD AUTO: 19.9 % (ref 19.6–45.3)
M PNEUMO IGG SER IA-ACNC: NOT DETECTED
MCH RBC QN AUTO: 28.6 PG (ref 26.6–33)
MCHC RBC AUTO-ENTMCNC: 33.3 G/DL (ref 31.5–35.7)
MCV RBC AUTO: 85.8 FL (ref 79–97)
MONOCYTES # BLD AUTO: 0.95 10*3/MM3 (ref 0.1–0.9)
MONOCYTES NFR BLD AUTO: 12.1 % (ref 5–12)
NEUTROPHILS NFR BLD AUTO: 4.88 10*3/MM3 (ref 1.7–7)
NEUTROPHILS NFR BLD AUTO: 62.3 % (ref 42.7–76)
NITRITE UR QL STRIP: NEGATIVE
NRBC BLD AUTO-RTO: 0 /100 WBC (ref 0–0.2)
PH UR STRIP.AUTO: <=5 [PH] (ref 5–9)
PLATELET # BLD AUTO: 164 10*3/MM3 (ref 140–450)
PMV BLD AUTO: 9 FL (ref 6–12)
POTASSIUM SERPL-SCNC: 4.4 MMOL/L (ref 3.5–5.2)
PROT UR QL STRIP: ABNORMAL
PROTHROMBIN TIME: 13.3 SECONDS (ref 11.1–15.3)
RBC # BLD AUTO: 4.72 10*6/MM3 (ref 4.14–5.8)
RBC # UR: ABNORMAL /HPF
REF LAB TEST METHOD: ABNORMAL
RHINOVIRUS RNA SPEC NAA+PROBE: NOT DETECTED
RSV RNA NPH QL NAA+NON-PROBE: NOT DETECTED
SODIUM SERPL-SCNC: 134 MMOL/L (ref 136–145)
SP GR UR STRIP: 1.04 (ref 1–1.03)
SQUAMOUS #/AREA URNS HPF: ABNORMAL /HPF
TROPONIN T SERPL-MCNC: <0.01 NG/ML (ref 0–0.03)
UROBILINOGEN UR QL STRIP: ABNORMAL
WBC # BLD AUTO: 7.84 10*3/MM3 (ref 3.4–10.8)
WBC UR QL AUTO: ABNORMAL /HPF

## 2021-06-09 PROCEDURE — 87040 BLOOD CULTURE FOR BACTERIA: CPT | Performed by: FAMILY MEDICINE

## 2021-06-09 PROCEDURE — 82962 GLUCOSE BLOOD TEST: CPT

## 2021-06-09 PROCEDURE — 63710000001 INSULIN DETEMIR PER 5 UNITS: Performed by: FAMILY MEDICINE

## 2021-06-09 PROCEDURE — 85025 COMPLETE CBC W/AUTO DIFF WBC: CPT | Performed by: HOSPITALIST

## 2021-06-09 PROCEDURE — 87633 RESP VIRUS 12-25 TARGETS: CPT | Performed by: HOSPITALIST

## 2021-06-09 PROCEDURE — 93005 ELECTROCARDIOGRAM TRACING: CPT | Performed by: FAMILY MEDICINE

## 2021-06-09 PROCEDURE — 87147 CULTURE TYPE IMMUNOLOGIC: CPT | Performed by: FAMILY MEDICINE

## 2021-06-09 PROCEDURE — 25010000002 CEFTRIAXONE PER 250 MG: Performed by: HOSPITALIST

## 2021-06-09 PROCEDURE — 94799 UNLISTED PULMONARY SVC/PX: CPT

## 2021-06-09 PROCEDURE — 93010 ELECTROCARDIOGRAM REPORT: CPT | Performed by: INTERNAL MEDICINE

## 2021-06-09 PROCEDURE — C1751 CATH, INF, PER/CENT/MIDLINE: HCPCS

## 2021-06-09 PROCEDURE — 84484 ASSAY OF TROPONIN QUANT: CPT | Performed by: HOSPITALIST

## 2021-06-09 PROCEDURE — 63710000001 INSULIN ASPART PER 5 UNITS: Performed by: HOSPITALIST

## 2021-06-09 PROCEDURE — 83036 HEMOGLOBIN GLYCOSYLATED A1C: CPT | Performed by: FAMILY MEDICINE

## 2021-06-09 PROCEDURE — 87070 CULTURE OTHR SPECIMN AEROBIC: CPT | Performed by: HOSPITALIST

## 2021-06-09 PROCEDURE — 05H533Z INSERTION OF INFUSION DEVICE INTO RIGHT SUBCLAVIAN VEIN, PERCUTANEOUS APPROACH: ICD-10-PCS | Performed by: INTERNAL MEDICINE

## 2021-06-09 PROCEDURE — 85610 PROTHROMBIN TIME: CPT | Performed by: HOSPITALIST

## 2021-06-09 PROCEDURE — 76937 US GUIDE VASCULAR ACCESS: CPT

## 2021-06-09 PROCEDURE — G0378 HOSPITAL OBSERVATION PER HR: HCPCS

## 2021-06-09 PROCEDURE — 80048 BASIC METABOLIC PNL TOTAL CA: CPT | Performed by: HOSPITALIST

## 2021-06-09 PROCEDURE — 87205 SMEAR GRAM STAIN: CPT | Performed by: HOSPITALIST

## 2021-06-09 PROCEDURE — 94760 N-INVAS EAR/PLS OXIMETRY 1: CPT

## 2021-06-09 PROCEDURE — 81001 URINALYSIS AUTO W/SCOPE: CPT | Performed by: FAMILY MEDICINE

## 2021-06-09 PROCEDURE — 25010000002 AZITHROMYCIN PER 500 MG: Performed by: HOSPITALIST

## 2021-06-09 PROCEDURE — 87899 AGENT NOS ASSAY W/OPTIC: CPT | Performed by: HOSPITALIST

## 2021-06-09 PROCEDURE — 87150 DNA/RNA AMPLIFIED PROBE: CPT | Performed by: FAMILY MEDICINE

## 2021-06-09 PROCEDURE — 36410 VNPNXR 3YR/> PHY/QHP DX/THER: CPT

## 2021-06-09 RX ORDER — WARFARIN SODIUM 10 MG/1
10 TABLET ORAL
Status: COMPLETED | OUTPATIENT
Start: 2021-06-09 | End: 2021-06-09

## 2021-06-09 RX ORDER — SODIUM CHLORIDE 9 MG/ML
75 INJECTION, SOLUTION INTRAVENOUS CONTINUOUS
Status: DISCONTINUED | OUTPATIENT
Start: 2021-06-09 | End: 2021-06-11

## 2021-06-09 RX ADMIN — INSULIN ASPART 8 UNITS: 100 INJECTION, SOLUTION INTRAVENOUS; SUBCUTANEOUS at 09:58

## 2021-06-09 RX ADMIN — DILTIAZEM HYDROCHLORIDE 120 MG: 120 CAPSULE, COATED, EXTENDED RELEASE ORAL at 10:43

## 2021-06-09 RX ADMIN — IPRATROPIUM BROMIDE AND ALBUTEROL SULFATE 3 ML: 2.5; .5 SOLUTION RESPIRATORY (INHALATION) at 13:16

## 2021-06-09 RX ADMIN — INSULIN ASPART 20 UNITS: 100 INJECTION, SOLUTION INTRAVENOUS; SUBCUTANEOUS at 12:40

## 2021-06-09 RX ADMIN — PRAMIPEXOLE DIHYDROCHLORIDE 2 MG: 1 TABLET ORAL at 21:47

## 2021-06-09 RX ADMIN — METHOCARBAMOL 500 MG: 500 TABLET, FILM COATED ORAL at 21:47

## 2021-06-09 RX ADMIN — AZITHROMYCIN MONOHYDRATE 500 MG: 500 INJECTION, POWDER, LYOPHILIZED, FOR SOLUTION INTRAVENOUS at 23:09

## 2021-06-09 RX ADMIN — ATORVASTATIN CALCIUM 80 MG: 40 TABLET, FILM COATED ORAL at 21:48

## 2021-06-09 RX ADMIN — METHOCARBAMOL 500 MG: 500 TABLET, FILM COATED ORAL at 12:40

## 2021-06-09 RX ADMIN — INSULIN DETEMIR 65 UNITS: 100 INJECTION, SOLUTION SUBCUTANEOUS at 21:48

## 2021-06-09 RX ADMIN — IPRATROPIUM BROMIDE AND ALBUTEROL SULFATE 3 ML: 2.5; .5 SOLUTION RESPIRATORY (INHALATION) at 19:03

## 2021-06-09 RX ADMIN — CEFTRIAXONE SODIUM 1 G: 1 INJECTION, POWDER, FOR SOLUTION INTRAMUSCULAR; INTRAVENOUS at 21:46

## 2021-06-09 RX ADMIN — ISOSORBIDE MONONITRATE 120 MG: 60 TABLET, EXTENDED RELEASE ORAL at 09:56

## 2021-06-09 RX ADMIN — GABAPENTIN 600 MG: 300 CAPSULE ORAL at 09:56

## 2021-06-09 RX ADMIN — PANTOPRAZOLE SODIUM 40 MG: 40 TABLET, DELAYED RELEASE ORAL at 21:48

## 2021-06-09 RX ADMIN — INSULIN ASPART 20 UNITS: 100 INJECTION, SOLUTION INTRAVENOUS; SUBCUTANEOUS at 17:09

## 2021-06-09 RX ADMIN — METOPROLOL SUCCINATE 50 MG: 50 TABLET, EXTENDED RELEASE ORAL at 10:42

## 2021-06-09 RX ADMIN — METHOCARBAMOL 500 MG: 500 TABLET, FILM COATED ORAL at 17:10

## 2021-06-09 RX ADMIN — GABAPENTIN 600 MG: 300 CAPSULE ORAL at 15:33

## 2021-06-09 RX ADMIN — INSULIN ASPART 20 UNITS: 100 INJECTION, SOLUTION INTRAVENOUS; SUBCUTANEOUS at 09:59

## 2021-06-09 RX ADMIN — LISINOPRIL 40 MG: 40 TABLET ORAL at 21:48

## 2021-06-09 RX ADMIN — SODIUM CHLORIDE, PRESERVATIVE FREE 10 ML: 5 INJECTION INTRAVENOUS at 21:46

## 2021-06-09 RX ADMIN — METHOCARBAMOL 500 MG: 500 TABLET, FILM COATED ORAL at 09:56

## 2021-06-09 RX ADMIN — PRASUGREL 10 MG: 10 TABLET, FILM COATED ORAL at 10:42

## 2021-06-09 RX ADMIN — RANOLAZINE 1000 MG: 500 TABLET, FILM COATED, EXTENDED RELEASE ORAL at 21:47

## 2021-06-09 RX ADMIN — AMLODIPINE BESYLATE 5 MG: 5 TABLET ORAL at 09:57

## 2021-06-09 RX ADMIN — SODIUM CHLORIDE 75 ML/HR: 9 INJECTION, SOLUTION INTRAVENOUS at 21:48

## 2021-06-09 RX ADMIN — GABAPENTIN 600 MG: 300 CAPSULE ORAL at 21:47

## 2021-06-09 RX ADMIN — RANOLAZINE 1000 MG: 500 TABLET, FILM COATED, EXTENDED RELEASE ORAL at 09:56

## 2021-06-09 RX ADMIN — IPRATROPIUM BROMIDE AND ALBUTEROL SULFATE 3 ML: 2.5; .5 SOLUTION RESPIRATORY (INHALATION) at 07:10

## 2021-06-09 RX ADMIN — INSULIN ASPART 8 UNITS: 100 INJECTION, SOLUTION INTRAVENOUS; SUBCUTANEOUS at 17:09

## 2021-06-09 RX ADMIN — SODIUM CHLORIDE 75 ML/HR: 9 INJECTION, SOLUTION INTRAVENOUS at 12:39

## 2021-06-09 RX ADMIN — WARFARIN SODIUM 10 MG: 10 TABLET ORAL at 17:10

## 2021-06-09 RX ADMIN — SODIUM CHLORIDE, PRESERVATIVE FREE 10 ML: 5 INJECTION INTRAVENOUS at 09:57

## 2021-06-09 RX ADMIN — TRAZODONE HYDROCHLORIDE 300 MG: 150 TABLET ORAL at 21:47

## 2021-06-09 NOTE — H&P
[facility] Medicine Admission      Date of Admission: 6/8/2021      Primary Care Physician: Mami Perez APRN      Chief Complaint: Chest pain and shortness of breath    HPI: Patient is a 40-year-old male who is well-known to our service who presented to the emergency department with 3 days of chest pain that is much worse with exertion, shortness of breath worse with exertion, and productive cough.  He denies fever, nausea, vomiting, and diarrhea.  He states the chest pain that he has is generally sharp and persistently with breathing, but it is much worse with deep breath.    Concurrent Medical History:  has a past medical history of Asthma, CAD (coronary artery disease), Chronic back pain, Chronic pulmonary embolism (CMS/HCC), Coronary artery disease, Diabetes mellitus (CMS/HCC), Factor II deficiency (CMS/HCC), Fatty liver, GERD (gastroesophageal reflux disease), Hyperlipidemia, Hypertension, Morbid obesity (CMS/HCC), RLS (restless legs syndrome), Seizures (CMS/HCC), and Sleep apnea.    Past Surgical History:  has a past surgical history that includes transesophageal echocardiogram (travis); pr rt/lt heart catheters (N/A, 3/15/2017); Cardiac catheterization (N/A, 3/15/2017); Cardiac catheterization (N/A, 3/15/2017); Coronary angioplasty with stent; Cardiac catheterization (N/A, 3/1/2018); Cholecystectomy; and Cardiac catheterization (N/A, 9/2/2020).    Family History: family history includes Cancer in his paternal grandfather; Heart disease in his mother; Obesity in his mother; Sleep apnea in his father.     Social History:  reports that he quit smoking about 24 years ago. His smoking use included cigarettes. He has a 0.13 pack-year smoking history. His smokeless tobacco use includes snuff. He reports that he does not drink alcohol and does not use drugs.    Allergies:   Allergies   Allergen Reactions   • Glipizide Other (See Comments) and Unknown (See Comments)      Slurred Speech  Slurred Speech  Hallucinations, Slurred Speech   • Reglan [Metoclopramide] Anxiety   • Tramadol Other (See Comments)     seizures   • Risperidone Other (See Comments)     Slurred speech  Can't stand, trouble breathing, slurred speech         Medications:   Prior to Admission medications    Medication Sig Start Date End Date Taking? Authorizing Provider   albuterol (PROVENTIL HFA;VENTOLIN HFA) 108 (90 BASE) MCG/ACT inhaler Inhale 2 puffs Every 4 (Four) Hours As Needed for Wheezing.    Provider, MD Latrice   amLODIPine (NORVASC) 5 MG tablet Take 1 tablet by mouth Daily. 4/2/21   Dinah Lin MD   atorvastatin (LIPITOR) 80 MG tablet Take 1 tablet by mouth Every Night. 9/29/20   Mami Perez APRN   azithromycin (Zithromax Z-Luca) 250 MG tablet Take 2 tabs on Day 1, then 1 tab daily for 4 additional days 4/15/21   Elyssa Penn APRN   dilTIAZem CD (Cardizem CD) 120 MG 24 hr capsule Take 1 capsule by mouth Daily. 10/8/20   Jduy Crews MD   Evolocumab (Repatha SureClick) solution auto-injector SureClick injection Inject 1 mL under the skin into the appropriate area as directed Every 14 (Fourteen) Days. 4/8/21   Mami Perez APRN   fenofibrate micronized (LOFIBRA) 134 MG capsule Take 1 capsule by mouth Every Morning Before Breakfast. 9/29/20   Mami Perez APRN   gabapentin (NEURONTIN) 300 MG capsule Take 2 capsules by mouth 3 (Three) Times a Day. 6/3/21   Mami Perez APRN   glucose blood test strip Use as instructed 1/8/21   Mami Perez APRN   glucose monitor monitoring kit 1 each As Needed (check blood glucose 3x daily). 9/29/20   Mami Perez APRN   insulin glargine (LANTUS) 100 UNIT/ML injection Inject 60 Units under the skin into the appropriate area as directed Every Night. 9/29/20   Mami Perez APRN   insulin lispro (HumaLOG) 100 UNIT/ML injection Inject 15 Units under the skin into the appropriate  "area as directed 3 (Three) Times a Day Before Meals.  Patient taking differently: Inject 25 Units under the skin into the appropriate area as directed 3 (Three) Times a Day Before Meals. 9/29/20   Mami Perez APRN   isosorbide mononitrate (IMDUR) 120 MG 24 hr tablet Take 1 tablet by mouth Every Morning for 30 days. 10/10/17 9/29/21  Minesh Gregg MD   lisinopril (PRINIVIL,ZESTRIL) 40 MG tablet Take 1 tablet by mouth Every Night. 9/29/20   Mami Perez APRN   methocarbamol (Robaxin) 500 MG tablet Take 1 tablet by mouth 4 (Four) Times a Day. 2/2/21   Mami Perez APRN   methylPREDNISolone (MEDROL) 4 MG dose pack Take as directed on package instructions. 4/15/21   Elyssa Penn APRN   metoprolol succinate XL (TOPROL-XL) 50 MG 24 hr tablet Take 1 tablet by mouth Daily. 9/29/20   Mami Perez APRN   Microlet Lancets misc One touch delica lancets 1/8/21   Mami Perez APRN   nitroglycerin (NITROSTAT) 0.4 MG SL tablet Place 1 tablet under the tongue Every 5 (Five) Minutes As Needed for Chest Pain for up to 15 days. 10/10/17 9/29/21  Minesh Gregg MD   pantoprazole (PROTONIX) 40 MG EC tablet Take 1 tablet by mouth Every Night. 9/29/20   Mami Perez APRN   pramipexole (MIRAPEX) 1 MG tablet Take 2 tablets by mouth Every Night. 9/29/20   Mami Perez APRN   prasugrel (EFFIENT) 10 MG tablet Take 1 tablet by mouth Daily. 9/29/20   Mami Perez APRN   ranolazine (RANEXA) 1000 MG 12 hr tablet Take 1 tablet by mouth Every 12 (Twelve) Hours. 2/2/21   Mami Perez APRN   ReliOn Insulin Syringe 31G X 15/64\" 0.5 ML misc For use with insulin 9/29/20   Mami Perez APRN   SITagliptin (Januvia) 100 MG tablet Take 1 tablet by mouth Daily. 1/8/21   Mami Perez APRN   traZODone (DESYREL) 300 MG tablet Take 1 tablet by mouth every night at bedtime. 9/29/20   Mami Perez APRN   warfarin (Coumadin) 7.5 " MG tablet Take 1 tablet nightly or as directed by [facility] 4/9/21   Silvestre Woodward, LALA       Review of Systems:  Review of Systems   Constitutional: Positive for activity change and fatigue. Negative for appetite change, chills, fever and unexpected weight change.   HENT: Negative for congestion, facial swelling, hearing loss, nosebleeds, rhinorrhea, sneezing, trouble swallowing and voice change.    Eyes: Negative for photophobia and visual disturbance.   Respiratory: Positive for shortness of breath. Negative for apnea, cough, choking, chest tightness, wheezing and stridor.    Cardiovascular: Positive for chest pain. Negative for palpitations and leg swelling.   Gastrointestinal: Negative for abdominal pain, blood in stool, constipation, diarrhea, nausea and vomiting.   Endocrine: Negative for cold intolerance, heat intolerance, polydipsia, polyphagia and polyuria.   Genitourinary: Negative for dysuria, flank pain and hematuria.   Musculoskeletal: Negative for arthralgias, back pain, myalgias and neck pain.   Skin: Negative for rash and wound.   Allergic/Immunologic: Negative for immunocompromised state.   Neurological: Negative for dizziness, seizures, syncope, speech difficulty, weakness, light-headedness, numbness and headaches.   Hematological: Does not bruise/bleed easily.   Psychiatric/Behavioral: Negative for agitation, behavioral problems, confusion, decreased concentration, hallucinations, self-injury and suicidal ideas. The patient is not nervous/anxious.       Otherwise complete ROS is negative except as mentioned above.    Physical Exam:   Temp:  [97.8 °F (36.6 °C)] 97.8 °F (36.6 °C)  Heart Rate:  [] 96  Resp:  [26] 26  BP: (132-201)/(78-96) 139/84     Physical Exam  Constitutional:       Appearance: He is well-developed. He is morbidly obese. He is ill-appearing.      Comments: BMI 60.61   HENT:      Head: Normocephalic and atraumatic.      Nose: Nose normal.   Eyes:      General: Lids  are normal. No scleral icterus.     Conjunctiva/sclera: Conjunctivae normal.      Pupils: Pupils are equal, round, and reactive to light.   Neck:      Vascular: No JVD.      Trachea: No tracheal tenderness or tracheal deviation.   Cardiovascular:      Rate and Rhythm: Normal rate and regular rhythm.      Pulses: Normal pulses.      Heart sounds: Normal heart sounds, S1 normal and S2 normal. No murmur heard.   No friction rub. No gallop.    Pulmonary:      Effort: Pulmonary effort is normal. No accessory muscle usage or respiratory distress.      Breath sounds: Decreased breath sounds and rhonchi present. No wheezing or rales.   Chest:      Chest wall: No tenderness.   Abdominal:      General: Bowel sounds are normal. There is no distension.      Palpations: Abdomen is soft. There is no mass.      Tenderness: There is no abdominal tenderness. There is no guarding or rebound.   Musculoskeletal:         General: No tenderness.      Cervical back: Normal range of motion and neck supple. No spinous process tenderness.      Right lower leg: Edema present.      Left lower leg: Edema present.   Skin:     General: Skin is warm.      Coloration: Skin is not pale.      Findings: No rash.   Neurological:      Mental Status: He is alert and oriented to person, place, and time.      Cranial Nerves: No cranial nerve deficit.      Sensory: No sensory deficit.      Motor: No atrophy, abnormal muscle tone or seizure activity.      Coordination: Coordination normal.      Deep Tendon Reflexes: Reflexes are normal and symmetric. Reflexes normal.   Psychiatric:         Behavior: Behavior normal.         Thought Content: Thought content normal.         Judgment: Judgment normal.       Results Reviewed:  I have personally reviewed current lab, radiology, and data and agree with results.  Lab Results (last 24 hours)     Procedure Component Value Units Date/Time    Urinalysis, Microscopic Only - Urine, Clean Catch [[id number]] Collected:  06/08/21 1756    Specimen: Urine, Clean Catch Updated: 06/08/21 1915     RBC, UA None Seen /HPF      WBC, UA 0-2 /HPF      Bacteria, UA None Seen /HPF      Squamous Epithelial Cells, UA 0-2 /HPF      Hyaline Casts, UA None Seen /LPF      Methodology Manual Light Microscopy    COVID-19 and FLU A/B PCR - Swab, Nasopharynx [[id number]]  (Normal) Collected: 06/08/21 1816    Specimen: Swab from Nasopharynx Updated: 06/08/21 1852     COVID19 Not Detected     Influenza A PCR Not Detected     Influenza B PCR Not Detected    Narrative:      Fact sheet for providers: https://www.fda.gov/media/136316/download    Fact sheet for patients: https://www.fda.gov/media/308333/download    Test performed by PCR.    Blood Gas, Venous - [[id number]]  (Abnormal) Collected: 06/08/21 1802    Specimen: Venous Blood Updated: 06/08/21 1841     Site Venous     pH, Venous 7.403 pH Units      pCO2, Venous 45.3 mm Hg      pO2, Venous 63.5 mm Hg      HCO3, Venous 28.2 mmol/L      Base Excess, Venous 2.8 mmol/L      Barometric Pressure for Blood Gas --     Comment: PATM not performed at this facility.        Modality N/A    Troponin [[id number]]  (Normal) Collected: 06/08/21 1802    Specimen: Blood Updated: 06/08/21 1829     Troponin T <0.010 ng/mL     Narrative:      Troponin T Reference Range:  <= 0.03 ng/mL-   Negative for AMI  >0.03 ng/mL-     Abnormal for myocardial necrosis.  Clinicians would have to utilize clinical acumen, EKG, Troponin and serial changes to determine if it is an Acute Myocardial Infarction or myocardial injury due to an underlying chronic condition.       Results may be falsely decreased if patient taking Biotin.      Blood Gas, Venous With Co-Ox [[id number]]  (Abnormal) Collected: 06/08/21 1800    Specimen: Venous Blood Updated: 06/08/21 1811     Site OTHER     pH, Venous 7.403 pH Units      pCO2, Venous 45.3 mm Hg      pO2, Venous 63.5 mm Hg      Comment: 83 Value above reference range        HCO3, Venous 28.2 mmol/L       Comment: 83 Value above reference range        Base Excess, Venous 2.8 mmol/L      Comment: 83 Value above reference range        O2 Saturation, Venous 92.7 %      Comment: 83 Value above reference range        Hemoglobin, Blood Gas 14.7 g/dL      Oxyhemoglobin Venous 90.5 %      Comment: 83 Value above reference range        Methemoglobin Venous 0.9 %      Carboxyhemoglobin Venous 1.4 %      Sodium, Venous 134 mmol/L      Comment: 84 Value below reference range        Potassium, Venous 4.2 mmol/L      Barometric Pressure for Blood Gas 747 mmHg      Modality Room Air     Ventilator Mode NA     Note --     Collected by RN     Comment: Meter: [id number]     :  [id number]       Urinalysis With Microscopic If Indicated (No Culture) - Urine, Clean Catch [[id number]]  (Abnormal) Collected: 06/08/21 1756    Specimen: Urine, Clean Catch Updated: 06/08/21 1808     Color, UA Yellow     Appearance, UA Clear     pH, UA 5.5     Specific [place], UA 1.039     Comment: Result obtained by Refractometer        Glucose, UA >=1000 mg/dL (3+)     Ketones, UA Negative     Bilirubin, UA Negative     Blood, UA Negative     Protein,  mg/dL (2+)     Leuk Esterase, UA Negative     Nitrite, UA Negative     Urobilinogen, UA 1.0 E.U./dL    Extra Tubes [[id number]] Collected: 06/08/21 1633    Specimen: Blood Updated: 06/08/21 1746    Narrative:      The following orders were created for panel order Extra Tubes.  Procedure                               Abnormality         Status                     ---------                               -----------         ------                     Lavender Top[[id number]]                                     Final result               Green Top (Gel)[[id number]]                                  Final result                 Please view results for these tests on the individual orders.    Lavender Top [[id number]] Collected: 06/08/21 1633    Specimen: Blood Updated: 06/08/21 1746     Extra Tube hold  for add-on     Comment: Auto resulted       Green Top (Gel) [[id number]] Collected: 06/08/21 1633    Specimen: Blood Updated: 06/08/21 1746     Extra Tube Hold for add-ons.     Comment: Auto resulted.       [place] Draw [[id number]] Collected: 06/08/21 1620    Specimen: Blood Updated: 06/08/21 1745    Narrative:      The following orders were created for panel order [place] Draw.  Procedure                               Abnormality         Status                     ---------                               -----------         ------                     Light Blue Top[[id number]]                                   Final result               Green Top (Gel)[[id number]]                                  Final result               Lavender Top[[id number]]                                     Final result               Gold Top - SST[[id number]]                                   Final result                 Please view results for these tests on the individual orders.    Light Blue Top [[id number]] Collected: 06/08/21 1633    Specimen: Blood Updated: 06/08/21 1745     Extra Tube hold for add-on     Comment: Auto resulted       Gold Top - SST [[id number]] Collected: 06/08/21 1633    Specimen: Blood Updated: 06/08/21 1745     Extra Tube Hold for add-ons.     Comment: Auto resulted.       Acetone [[id number]]  (Normal) Collected: 06/08/21 1633    Specimen: Blood Updated: 06/08/21 1737     Acetone Negative    Magnesium [[id number]]  (Abnormal) Collected: 06/08/21 1633    Specimen: Blood Updated: 06/08/21 1734     Magnesium 1.5 mg/dL     Lavender Top [[id number]] Collected: 06/08/21 1620    Specimen: Blood Updated: 06/08/21 1730     Extra Tube hold for add-on     Comment: Auto resulted       Green Top (Gel) [[id number]] Collected: 06/08/21 1620    Specimen: Blood Updated: 06/08/21 1730     Extra Tube Hold for add-ons.     Comment: Auto resulted.       Comprehensive Metabolic Panel [[id number]]  (Abnormal) Collected: 06/08/21 1620    Specimen:  Blood Updated: 06/08/21 1719     Glucose 432 mg/dL      BUN 12 mg/dL      Creatinine 0.82 mg/dL      Sodium 129 mmol/L      Potassium 4.5 mmol/L      Chloride 94 mmol/L      CO2 19.0 mmol/L      Calcium 9.2 mg/dL      Total Protein 7.1 g/dL      Albumin 3.30 g/dL      ALT (SGPT) 20 U/L      AST (SGOT) 24 U/L      Alkaline Phosphatase 87 U/L      Total Bilirubin 0.4 mg/dL      eGFR Non African Amer 103 mL/min/1.73      Globulin 3.8 gm/dL      A/G Ratio 0.9 g/dL      BUN/Creatinine Ratio 14.6     Anion Gap 16.0 mmol/L     Narrative:      GFR Normal >60  Chronic Kidney Disease <60  Kidney Failure <15      CBC & Differential [[id number]]  (Abnormal) Collected: 06/08/21 1620    Specimen: Blood Updated: 06/08/21 1702    Narrative:      The following orders were created for panel order CBC & Differential.  Procedure                               Abnormality         Status                     ---------                               -----------         ------                     Scan Slide[[id number]]                   Normal              Final result               CBC Auto Differential[[id number]]        Abnormal            Final result                 Please view results for these tests on the individual orders.    Scan Slide [[id number]]  (Normal) Collected: 06/08/21 1620    Specimen: Blood Updated: 06/08/21 1702     RBC Morphology Normal     WBC Morphology Normal     Platelet Morphology Normal    Protime-INR [[id number]]  (Normal) Collected: 06/08/21 1633    Specimen: Blood Updated: 06/08/21 1658     Protime 13.5 Seconds      INR 0.99    Narrative:      Therapeutic range for most indications is 2.0-3.0 INR,  or 2.5-3.5 for mechanical heart valves.    Troponin [[id number]]  (Normal) Collected: 06/08/21 1620    Specimen: Blood Updated: 06/08/21 1651     Troponin T <0.010 ng/mL     Narrative:      Troponin T Reference Range:  <= 0.03 ng/mL-   Negative for AMI  >0.03 ng/mL-     Abnormal for myocardial necrosis.  Clinicians would  have to utilize clinical acumen, EKG, Troponin and serial changes to determine if it is an Acute Myocardial Infarction or myocardial injury due to an underlying chronic condition.       Results may be falsely decreased if patient taking Biotin.      BNP [[id number]]  (Normal) Collected: 21 162    Specimen: Blood Updated: 21 1649     proBNP 46.9 pg/mL     Narrative:      Among patients with dyspnea, NT-proBNP is highly sensitive for the detection of acute congestive heart failure. In addition NT-proBNP of <300 pg/ml effectively rules out acute congestive heart failure with 99% negative predictive value.    Results may be falsely decreased if patient taking Biotin.      CBC Auto Differential [[id number]]  (Abnormal) Collected: 21 162    Specimen: Blood Updated: 21 1634     WBC 7.51 10*3/mm3      RBC 5.16 10*6/mm3      Hemoglobin 14.5 g/dL      Hematocrit 41.9 %      MCV 81.2 fL      MCH 28.1 pg      MCHC 34.6 g/dL      RDW 13.9 %      RDW-SD 40.4 fl      MPV 9.2 fL      Platelets 178 10*3/mm3      Neutrophil % 66.5 %      Lymphocyte % 18.4 %      Monocyte % 10.1 %      Eosinophil % 3.6 %      Basophil % 0.3 %      Immature Grans % 1.1 %      Neutrophils, Absolute 5.00 10*3/mm3      Lymphocytes, Absolute 1.38 10*3/mm3      Monocytes, Absolute 0.76 10*3/mm3      Eosinophils, Absolute 0.27 10*3/mm3      Basophils, Absolute 0.02 10*3/mm3      Immature Grans, Absolute 0.08 10*3/mm3      nRBC 0.0 /100 WBC         Imaging Results (Last 24 Hours)     Procedure Component Value Units Date/Time    CT Angiogram Chest [[id number]] Collected: 21 173     Updated: 21 175    Narrative:      PROCEDURE: CT ANGIOGRAM CHEST      .        EXAM:  Computed Tomography with CTA         REGION:  Chest         INDICATION:   Chest pain, shortness of breath  - rule out  pulmonary embolism         CORRELATIVE IMAGIN2021                          TECHNIQUE:             - PE / vascular protocol                - reconstructions:  axial, coronal, sagittal, obliques     - computer-generated 3D reconstructions (MIPS) were performed.                - contrast:  intravenous 74 mL of Isovue-370                         This exam was performed according to our departmental  dose-optimization program, which includes automated exposure  control, adjustment of the mA and/or kV according to patient size  and/or use of iterative reconstruction technique.  DLP is 1967.9              COMMENTS:                               - Pulmonary arterial system:      - Main pulmonary artery trunk:  negative      - Left, right main pulmonary arteries: negative      - Lobar arteries: negative      - Segmental arteries: Limited evaluation of segmental  arteries due to contrast phase       - Systemic vascularity (as visualized):        - Aorta:  grossly negative / normal caliber / no dissection        - roots of great vessels:  grossly negative / normal caliber         - SVC / IVC:  grossly negative / normal caliber         - Misc (limited visualization):      - pulmonary parenchyma:  New, patchy airspace opacification  in the right lower lobe      - pleura:  negative      - mediastinal / glendy:  negative      - neck, inferior:  grossly wnl      - subdiaphragmatic structures: Status post cholecystectomy-  osseous:  Old healed left-sided rib fractures are present, as  previously      - misc:  Small hiatal hernia      .      Impression:      1.  No evidence of pulmonary embolism, with the segmental  branches less than optimally evaluated due to contrast phase no  large, central filling defect identified.  2.  No evidence of pathology associated with the visualized  aorta.      3. New patchy airspace opacification in the right lower lobe,  suspicious for pneumonia. Follow-up to complete radiographic  resolution recommended    Electronically signed by:  Gela Shaffer MD  6/8/2021 5:56 PM CDT  Workstation: [id number]            Assessment:    Active  Hospital Problems    Diagnosis    • Pneumonia of right lower lobe due to infectious organism    • Paroxysmal atrial fibrillation (CMS/[facility])    • Hypertension    • Type 2 diabetes mellitus with hyperglycemia, with long-term current use of insulin (CMS/[facility])    • Chronic pulmonary embolism (CMS/[facility])    • RLS (restless legs syndrome)    • Hyperlipidemia    • Class 3 severe obesity due to excess calories with serious comorbidity and body mass index (BMI) of 60.0 to 69.9 in adult (CMS/[facility])              Plan:  1.  Right lower lobe pneumonia: Continue empiric antibiotics.  Covid screen negative.  2.  Dyspnea: Shortness of breath is likely worse due to pneumonia.  3.  Paroxysmal atrial fibrillation: Continue home medication.  4.  History of pulmonary embolism: Continue anticoagulation.  CTA negative for current pulmonary embolism.  5.  Diabetes mellitus: Poorly controlled.  Will place back on home long-acting insulin and sliding scale.  6.  Morbid obesity: BMI 60.61.  Lifestyle modification recommended.  7.  Obstructive sleep apnea: Continue CPAP.  8.  Coronary artery disease: Chest pain most likely pleuritic in nature.  Continue home medications.  9.  DVT prophylaxis: Home Coumadin.    Of note, patient states he is not been able to afford any of his medications.  States he has had no medications in the last 3 weeks at least.  Will consult case management.    I confirmed that the patient's Advance Care Plan is present, code status is documented, or surrogate decision maker is listed in the patient's medical record.     I have utilized all available immediate resources to obtain, update, or review the patient's current medications.     I discussed the patient's findings and my recommendations with: Patient        This document has been electronically signed by Yordy Goncalves MD on June 8, 2021 19:22 CDT

## 2021-06-09 NOTE — PROGRESS NOTES
Item 0 Points 1 Point 2 Points 3 Points 4 Points Subtotal   Mental Status Alert, oriented, cooperative Lethargic, follows commands Confused, not following commands Obtunded or Somnolent Comatose 0     Respiratory Pattern Regular RR 8-16 breaths/minute Increased RR 18-25 breaths/minute Dyspnea on exertion, irregular RR 26-30 breaths/minute Shortness of breath,  RR 31-35 breaths/minute Accessory muscle use, severe SOB  RR > 35 breaths/minute 1   Breath Sounds Clear Decreased unilaterally Decreased bilaterally Basilar crackles Wheezing and/or rhonchi 4   Cough Strong, spontaneous, non-productive Strong productive Weak, non-productive Weak, productive or weak with rhonchi Absent or may require suctioning 1     Pulmonary Status Nonsmoker, no previous history, >1 year quit < 1 PPD  <1 year quit > or = 1 PPD Diagnosed pulmonary disease (severe or chronic) Severe or chronic pulmonary disease with exacerbation 0   Surgical Status None General surgery (non-abdominal or non-thoracic) Lower abdominal Thoracic or upper abdominal Thoracic with pulmonary disease 0   Chest X-ray Clear Chronic changes Infiltrates, atelectasis or pleural effusion Infiltrates in > 1 lobe Diffuse infiltrates and atelectasis and/or effusions 0   Activity   Level Ambulatory Ambulatory with assistance Non-ambulatory Paraplegic Quadriplegic 0        Total Score   6   Score    Drug Therapy Frequency 20 or >    Q4 Duoneb with Q2 Albuterol PRN 15-19    Q6 Duoneb with Q4 Albuterol PRN 10-14    QID Duoneb with Q4 Albuterol PRN 5-9    TID Duoneb with Q6 Albuterol PRN 0-4    Q4 PRN Duoneb                  Lung Expansion Therapy (PEP) Bronchopulmonary Hygiene (CPT)   Q4 & PRN - Severe atelectasis, poor oxygenation Q4 - Copious secretions, dyspnea, unable to sleep   QID - High risk for persistent atelectasis, existence of atelectasis QID & Q4 PRN - Moderate secretion production   TID - At risk for developing atelectasis TID - Small amounts of secretions with poor  cough   BID - Prevention of atelectasis BID - Unable to breathe deeply and cough spontaneously     RT Comments / Recommendations:    Per RT protocol I recommend a TID Duoneb with Q6 Albuterol PRN. Pt has a history of asthma

## 2021-06-09 NOTE — PLAN OF CARE
Goal Outcome Evaluation:  Plan of Care Reviewed With: patient        Progress: no change  Outcome Summary: meds given, labs done, fluids to be started

## 2021-06-09 NOTE — PROGRESS NOTES
"    [facility]  INPATIENT PROGRESS NOTE    Length of Stay: 0  Date of Admission: 6/8/2021  Primary Care Physician: Mami Perez APRN    Subjective   Chief Complaint: \"My breathing is awful\"   HPI:  No new problems this morning. Says he continues with shortness of breath, no improvement since admission. Has intermittent cough, largely nonproductive. He describes his breathing as \"tight\" but denies chest pain. No nausea, vomiting, diarrhea. Notes dark urine and decreased urine output overnight.   Does not have previous supplemental o2 requirement at home. Currently requiring 2lt 02 NC.  Review of Systems     All pertinent negatives and positives are as above. All other systems have been reviewed and are negative unless otherwise stated.     Objective    Temp:  [97.4 °F (36.3 °C)-98.3 °F (36.8 °C)] 98.3 °F (36.8 °C)  Heart Rate:  [] 76  Resp:  [22-26] 22  BP: ()/(49-96) 110/54    Physical Exam  Vitals reviewed.   Constitutional:       Appearance: He is obese.   HENT:      Head: Normocephalic.      Mouth/Throat:      Mouth: Mucous membranes are moist.   Cardiovascular:      Rate and Rhythm: Normal rate and regular rhythm.      Heart sounds: Normal heart sounds.   Pulmonary:      Effort: Pulmonary effort is normal.      Breath sounds: Wheezing present.      Comments: Diminished in bilateral bases with faint expiratory wheeze. No rhonchi appreciated.   Abdominal:      General: Bowel sounds are normal.      Palpations: Abdomen is soft.   Skin:     General: Skin is warm and dry.   Neurological:      General: No focal deficit present.      Mental Status: He is alert and oriented to person, place, and time.   Psychiatric:         Mood and Affect: Mood normal.         Behavior: Behavior normal.             Results Review:  I have reviewed the labs, radiology results, and diagnostic studies.    Laboratory Data:   Results from last 7 days   Lab Units " 21  0559 21  1800 21  1620   SODIUM mmol/L 134*  --  129*   SODIUM, VENOUS mmol/L  --  134*  --    POTASSIUM mmol/L 4.4  --  4.5   POTASSIUM, VENOUS mmol/L  --  4.2  --    CHLORIDE mmol/L 97*  --  94*   CO2 mmol/L 29.0  --  19.0*   BUN mg/dL 15  --  12   CREATININE mg/dL 1.69*  --  0.82   GLUCOSE mg/dL 301*  --  432*   CALCIUM mg/dL 8.9  --  9.2   BILIRUBIN mg/dL  --   --  0.4   ALK PHOS U/L  --   --  87   ALT (SGPT) U/L  --   --  20   AST (SGOT) U/L  --   --  24   ANION GAP mmol/L 8.0  --  16.0*     Estimated Creatinine Clearance: 99.9 mL/min (A) (by C-G formula based on SCr of 1.69 mg/dL (H)).  Results from last 7 days   Lab Units 21  1633   MAGNESIUM mg/dL 1.5*         Results from last 7 days   Lab Units 21  0559 21  1620   WBC 10*3/mm3 7.84 7.51   HEMOGLOBIN g/dL 13.5 14.5   HEMATOCRIT % 40.5 41.9   PLATELETS 10*3/mm3 164 178     Results from last 7 days   Lab Units 21  0559 21  2145 21  1633   INR  0.97 0.95 0.99       Culture Data:   No results found for: BLOODCX  No results found for: URINECX  No results found for: RESPCX  No results found for: WOUNDCX  No results found for: STOOLCX  No components found for: BODYFLD    Radiology Data:   Imaging Results (Last 24 Hours)     Procedure Component Value Units Date/Time    CT Angiogram Chest [[id number]] Collected: 21     Updated: 21    Narrative:      PROCEDURE: CT ANGIOGRAM CHEST      .        EXAM:  Computed Tomography with CTA         REGION:  Chest         INDICATION:   Chest pain, shortness of breath  - rule out  pulmonary embolism         CORRELATIVE IMAGIN2021                          TECHNIQUE:             - PE / vascular protocol               - reconstructions:  axial, coronal, sagittal, obliques     - computer-generated 3D reconstructions (MIPS) were performed.                - contrast:  intravenous 74 mL of Isovue-370                         This exam was performed  according to our departmental  dose-optimization program, which includes automated exposure  control, adjustment of the mA and/or kV according to patient size  and/or use of iterative reconstruction technique.  DLP is 1967.9              COMMENTS:                               - Pulmonary arterial system:      - Main pulmonary artery trunk:  negative      - Left, right main pulmonary arteries: negative      - Lobar arteries: negative      - Segmental arteries: Limited evaluation of segmental  arteries due to contrast phase       - Systemic vascularity (as visualized):        - Aorta:  grossly negative / normal caliber / no dissection        - roots of great vessels:  grossly negative / normal caliber         - SVC / IVC:  grossly negative / normal caliber         - Misc (limited visualization):      - pulmonary parenchyma:  New, patchy airspace opacification  in the right lower lobe      - pleura:  negative      - mediastinal / glendy:  negative      - neck, inferior:  grossly wnl      - subdiaphragmatic structures: Status post cholecystectomy-  osseous:  Old healed left-sided rib fractures are present, as  previously      - misc:  Small hiatal hernia      .      Impression:      1.  No evidence of pulmonary embolism, with the segmental  branches less than optimally evaluated due to contrast phase no  large, central filling defect identified.  2.  No evidence of pathology associated with the visualized  aorta.      3. New patchy airspace opacification in the right lower lobe,  suspicious for pneumonia. Follow-up to complete radiographic  resolution recommended    Electronically signed by:  Gela Shaffer MD  6/8/2021 5:56 PM CDT  Workstation: [id number]          I have reviewed the patient's current medications.     Assessment/Plan         Hyperlipidemia    ADOLFO on CPAP    RLS (restless legs syndrome)    Chronic pulmonary embolism (CMS/HCC)-CTA chest without obvious recurrent PE despite noncompliance with warfarin  therapy. Warfarin resumed. Trend INR daily.     Class 3 severe obesity due to excess calories with serious comorbidity and body mass index (BMI) of 60.0 to 69.9 in adult (CMS/[facility], located within [facility])    Atherosclerotic heart disease of native coronary artery with other forms of angina pectoris (CMS/[facility], located within [facility])    Paroxysmal atrial fibrillation (CMS/[facility], located within [facility])-Rate controlled with diltiazem and metoprolol. INR subtherapeutic on warfarin therapy due to noncompliance. Warfarin resumed, trend INR daily.     Hypertension-Controlled with metoprolol, lisinopril, isosorbide and amlodipine.     Type 2 diabetes mellitus with hyperglycemia, with long-term current use of insulin (CMS/[facility], located within [facility])-Appears uncontrolled. Check current A1c. Will increase basal insulin dosing. Continue SSI/accuchecks.     Acute hypoxemic respiratory failure-secondary to CA-PNA. Patient has previously refused COVID vaccination. Testing is negative on admission. Continue current empiric coverage with rocephin and azithromycin. Follow blood and sputum culture results. Continue o2 support and bronchodliator therapy.     Pneumonia of right lower lobe due to infectious organism-as above.     Renal Insufficiency-SARAH with UA showing concentrated urine and proteinuria. Start IVF now. Last echocardiogram 4/2/21 with preserved EF and diastolic function. Trend labs.         Discharge Planning: I expect patient to be discharged to home in 1-2 days.     Susy White MD      I confirmed that the patient's Advance Care Plan is present, code status is documented, or surrogate decision maker is listed in the patient's medical record.

## 2021-06-09 NOTE — PROGRESS NOTES
"Anticoagulation by Pharmacy - Warfarin    Yordy Hansen is a 40 y.o.male who has been consulted for warfarin for atrial fibrillation/hx of PE.     Home regimen: Warfarin 7.5 mg or as directed by [facility]  INR Goal: 2-3    Objective:  [Ht: 180.3 cm (71\"); Wt: (!) 197 kg (434 lb 9.6 oz)]    Lab Results   Component Value Date    INR 0.97 06/09/2021    INR 0.95 06/08/2021    INR 0.99 06/08/2021    PROTIME 13.3 06/09/2021    PROTIME 13.1 06/08/2021    PROTIME 13.5 06/08/2021     Lab Results   Component Value Date    HGB 13.5 06/09/2021    HGB 14.5 06/08/2021    HGB 13.2 04/02/2021    HCT 40.5 06/09/2021    HCT 41.9 06/08/2021    HCT 40.0 04/02/2021     06/09/2021     06/08/2021     04/02/2021       Recent Warfarin Administrations     The 5 most recent administrations since 06/02/2021 are shown below each listed medication.    Warfarin Sodium       Order Dose Date Given     warfarin (COUMADIN) tablet 7.5 mg 7.5 mg 06/08/2021                Assessment  • H/H/plts WNL  • Interacting medications: azithromycin, ceftriaxone, prasugrel  • INR is at baseline. Patient given 7.5 mg last night, will increase to 10 mg for today.     Plan:  1. Give warfarin 10 mg tablet PO @ 1800 tonight  2. PT/INR ordered daily  3. Pharmacy will continue to follow    Mikey Vincent [facility]  06/09/21 14:31 CDT     "

## 2021-06-09 NOTE — ED NOTES
Pharmacy notified of boarder status and released admission orders     Lala Garcia RN  06/08/21 [id number]

## 2021-06-10 LAB
ANION GAP SERPL CALCULATED.3IONS-SCNC: 5 MMOL/L (ref 5–15)
BACTERIA BLD CULT: ABNORMAL
BACTERIA SPEC AEROBE CULT: ABNORMAL
BASOPHILS # BLD AUTO: 0.03 10*3/MM3 (ref 0–0.2)
BASOPHILS NFR BLD AUTO: 0.5 % (ref 0–1.5)
BUN SERPL-MCNC: 23 MG/DL (ref 6–20)
BUN/CREAT SERPL: 18.9 (ref 7–25)
CALCIUM SPEC-SCNC: 8.8 MG/DL (ref 8.6–10.5)
CHLORIDE SERPL-SCNC: 98 MMOL/L (ref 98–107)
CO2 SERPL-SCNC: 26 MMOL/L (ref 22–29)
CREAT SERPL-MCNC: 1.22 MG/DL (ref 0.76–1.27)
DEPRECATED RDW RBC AUTO: 43.1 FL (ref 37–54)
EOSINOPHIL # BLD AUTO: 0.29 10*3/MM3 (ref 0–0.4)
EOSINOPHIL NFR BLD AUTO: 4.4 % (ref 0.3–6.2)
ERYTHROCYTE [DISTWIDTH] IN BLOOD BY AUTOMATED COUNT: 14.6 % (ref 12.3–15.4)
GFR SERPL CREATININE-BSD FRML MDRD: 65 ML/MIN/1.73
GLUCOSE BLDC GLUCOMTR-MCNC: 166 MG/DL (ref 70–130)
GLUCOSE BLDC GLUCOMTR-MCNC: 185 MG/DL (ref 70–130)
GLUCOSE BLDC GLUCOMTR-MCNC: 247 MG/DL (ref 70–130)
GLUCOSE BLDC GLUCOMTR-MCNC: 251 MG/DL (ref 70–130)
GLUCOSE BLDC GLUCOMTR-MCNC: 281 MG/DL (ref 70–130)
GLUCOSE SERPL-MCNC: 184 MG/DL (ref 65–99)
GRAM STN SPEC: ABNORMAL
HCT VFR BLD AUTO: 35.6 % (ref 37.5–51)
HGB BLD-MCNC: 11.9 G/DL (ref 13–17.7)
IMM GRANULOCYTES # BLD AUTO: 0.09 10*3/MM3 (ref 0–0.05)
IMM GRANULOCYTES NFR BLD AUTO: 1.4 % (ref 0–0.5)
INR PPP: 1.05 (ref 0.8–1.2)
ISOLATED FROM: ABNORMAL
LYMPHOCYTES # BLD AUTO: 1.94 10*3/MM3 (ref 0.7–3.1)
LYMPHOCYTES NFR BLD AUTO: 29.7 % (ref 19.6–45.3)
MCH RBC QN AUTO: 27.5 PG (ref 26.6–33)
MCHC RBC AUTO-ENTMCNC: 33.4 G/DL (ref 31.5–35.7)
MCV RBC AUTO: 82.4 FL (ref 79–97)
MONOCYTES # BLD AUTO: 0.85 10*3/MM3 (ref 0.1–0.9)
MONOCYTES NFR BLD AUTO: 13 % (ref 5–12)
NEUTROPHILS NFR BLD AUTO: 3.34 10*3/MM3 (ref 1.7–7)
NEUTROPHILS NFR BLD AUTO: 51 % (ref 42.7–76)
NRBC BLD AUTO-RTO: 0 /100 WBC (ref 0–0.2)
PLATELET # BLD AUTO: 163 10*3/MM3 (ref 140–450)
PMV BLD AUTO: 9.3 FL (ref 6–12)
POTASSIUM SERPL-SCNC: 3.9 MMOL/L (ref 3.5–5.2)
PROTHROMBIN TIME: 14.1 SECONDS (ref 11.1–15.3)
RBC # BLD AUTO: 4.32 10*6/MM3 (ref 4.14–5.8)
SODIUM SERPL-SCNC: 129 MMOL/L (ref 136–145)
WBC # BLD AUTO: 6.54 10*3/MM3 (ref 3.4–10.8)

## 2021-06-10 PROCEDURE — 94799 UNLISTED PULMONARY SVC/PX: CPT

## 2021-06-10 PROCEDURE — 82962 GLUCOSE BLOOD TEST: CPT

## 2021-06-10 PROCEDURE — 94760 N-INVAS EAR/PLS OXIMETRY 1: CPT

## 2021-06-10 PROCEDURE — 85610 PROTHROMBIN TIME: CPT | Performed by: HOSPITALIST

## 2021-06-10 PROCEDURE — 63710000001 INSULIN ASPART PER 5 UNITS: Performed by: HOSPITALIST

## 2021-06-10 PROCEDURE — 63710000001 INSULIN DETEMIR PER 5 UNITS: Performed by: FAMILY MEDICINE

## 2021-06-10 PROCEDURE — 85025 COMPLETE CBC W/AUTO DIFF WBC: CPT | Performed by: HOSPITALIST

## 2021-06-10 PROCEDURE — 80048 BASIC METABOLIC PNL TOTAL CA: CPT | Performed by: HOSPITALIST

## 2021-06-10 PROCEDURE — 36415 COLL VENOUS BLD VENIPUNCTURE: CPT | Performed by: HOSPITALIST

## 2021-06-10 PROCEDURE — 25010000002 CEFTRIAXONE PER 250 MG: Performed by: HOSPITALIST

## 2021-06-10 RX ORDER — ACETAMINOPHEN 325 MG/1
650 TABLET ORAL EVERY 4 HOURS PRN
Status: DISCONTINUED | OUTPATIENT
Start: 2021-06-10 | End: 2021-06-12 | Stop reason: HOSPADM

## 2021-06-10 RX ORDER — WARFARIN SODIUM 10 MG/1
10 TABLET ORAL
Status: COMPLETED | OUTPATIENT
Start: 2021-06-10 | End: 2021-06-10

## 2021-06-10 RX ADMIN — PRASUGREL 10 MG: 10 TABLET, FILM COATED ORAL at 08:37

## 2021-06-10 RX ADMIN — IPRATROPIUM BROMIDE AND ALBUTEROL SULFATE 3 ML: 2.5; .5 SOLUTION RESPIRATORY (INHALATION) at 13:25

## 2021-06-10 RX ADMIN — METHOCARBAMOL 500 MG: 500 TABLET, FILM COATED ORAL at 20:39

## 2021-06-10 RX ADMIN — IPRATROPIUM BROMIDE AND ALBUTEROL SULFATE 3 ML: 2.5; .5 SOLUTION RESPIRATORY (INHALATION) at 19:47

## 2021-06-10 RX ADMIN — WARFARIN SODIUM 10 MG: 10 TABLET ORAL at 18:11

## 2021-06-10 RX ADMIN — INSULIN ASPART 12 UNITS: 100 INJECTION, SOLUTION INTRAVENOUS; SUBCUTANEOUS at 18:12

## 2021-06-10 RX ADMIN — LISINOPRIL 40 MG: 40 TABLET ORAL at 20:39

## 2021-06-10 RX ADMIN — METHOCARBAMOL 500 MG: 500 TABLET, FILM COATED ORAL at 18:11

## 2021-06-10 RX ADMIN — INSULIN ASPART 20 UNITS: 100 INJECTION, SOLUTION INTRAVENOUS; SUBCUTANEOUS at 18:11

## 2021-06-10 RX ADMIN — INSULIN ASPART 4 UNITS: 100 INJECTION, SOLUTION INTRAVENOUS; SUBCUTANEOUS at 08:36

## 2021-06-10 RX ADMIN — INSULIN ASPART 12 UNITS: 100 INJECTION, SOLUTION INTRAVENOUS; SUBCUTANEOUS at 14:35

## 2021-06-10 RX ADMIN — INSULIN DETEMIR 65 UNITS: 100 INJECTION, SOLUTION SUBCUTANEOUS at 21:46

## 2021-06-10 RX ADMIN — GABAPENTIN 600 MG: 300 CAPSULE ORAL at 08:37

## 2021-06-10 RX ADMIN — METHOCARBAMOL 500 MG: 500 TABLET, FILM COATED ORAL at 14:34

## 2021-06-10 RX ADMIN — ATORVASTATIN CALCIUM 80 MG: 40 TABLET, FILM COATED ORAL at 20:40

## 2021-06-10 RX ADMIN — RANOLAZINE 1000 MG: 500 TABLET, FILM COATED, EXTENDED RELEASE ORAL at 08:36

## 2021-06-10 RX ADMIN — TRAZODONE HYDROCHLORIDE 300 MG: 150 TABLET ORAL at 20:40

## 2021-06-10 RX ADMIN — GABAPENTIN 600 MG: 300 CAPSULE ORAL at 18:11

## 2021-06-10 RX ADMIN — IPRATROPIUM BROMIDE AND ALBUTEROL SULFATE 3 ML: 2.5; .5 SOLUTION RESPIRATORY (INHALATION) at 06:39

## 2021-06-10 RX ADMIN — METOPROLOL SUCCINATE 50 MG: 50 TABLET, EXTENDED RELEASE ORAL at 08:37

## 2021-06-10 RX ADMIN — SODIUM CHLORIDE, PRESERVATIVE FREE 10 ML: 5 INJECTION INTRAVENOUS at 19:27

## 2021-06-10 RX ADMIN — RANOLAZINE 1000 MG: 500 TABLET, FILM COATED, EXTENDED RELEASE ORAL at 20:38

## 2021-06-10 RX ADMIN — INSULIN ASPART 20 UNITS: 100 INJECTION, SOLUTION INTRAVENOUS; SUBCUTANEOUS at 08:37

## 2021-06-10 RX ADMIN — GABAPENTIN 600 MG: 300 CAPSULE ORAL at 20:40

## 2021-06-10 RX ADMIN — INSULIN ASPART 20 UNITS: 100 INJECTION, SOLUTION INTRAVENOUS; SUBCUTANEOUS at 14:36

## 2021-06-10 RX ADMIN — PRAMIPEXOLE DIHYDROCHLORIDE 2 MG: 1 TABLET ORAL at 20:39

## 2021-06-10 RX ADMIN — ISOSORBIDE MONONITRATE 120 MG: 60 TABLET, EXTENDED RELEASE ORAL at 06:17

## 2021-06-10 RX ADMIN — METHOCARBAMOL 500 MG: 500 TABLET, FILM COATED ORAL at 08:37

## 2021-06-10 RX ADMIN — SODIUM CHLORIDE 75 ML/HR: 9 INJECTION, SOLUTION INTRAVENOUS at 19:27

## 2021-06-10 RX ADMIN — AMLODIPINE BESYLATE 5 MG: 5 TABLET ORAL at 08:36

## 2021-06-10 RX ADMIN — CEFTRIAXONE SODIUM 1 G: 1 INJECTION, POWDER, FOR SOLUTION INTRAMUSCULAR; INTRAVENOUS at 19:27

## 2021-06-10 RX ADMIN — DILTIAZEM HYDROCHLORIDE 120 MG: 120 CAPSULE, COATED, EXTENDED RELEASE ORAL at 08:37

## 2021-06-10 RX ADMIN — ACETAMINOPHEN 650 MG: 325 TABLET, FILM COATED ORAL at 06:37

## 2021-06-10 RX ADMIN — PANTOPRAZOLE SODIUM 40 MG: 40 TABLET, DELAYED RELEASE ORAL at 20:40

## 2021-06-10 NOTE — NURSING NOTE
Kim RT notified this RN that patient was complaining of blurry vision, glucose recheck in progress.

## 2021-06-10 NOTE — PROGRESS NOTES
Progress Note  Rehan Griffiths MD  Hospitalist    Date of visit: 6/10/2021     LOS: 0 days   Patient Care Team:  Mami Perez APRN as PCP - General (Family Medicine)    Chief Complaint: Cough and shortness of breath    Subjective     Interval History:     Patient Complaints: Cough and shortness of breath, somewhat better    History taken from: Patient    Medication Review:   Current Facility-Administered Medications   Medication Dose Route Frequency Provider Last Rate Last Admin   • acetaminophen (TYLENOL) tablet 650 mg  650 mg Oral Q4H PRN Judy Crews MD   650 mg at 06/10/21 0637   • albuterol (PROVENTIL) nebulizer solution 0.083% 2.5 mg/3mL  2.5 mg Nebulization Q6H PRN Simon Roldan MD       • amLODIPine (NORVASC) tablet 5 mg  5 mg Oral Q24H Yordy Goncalves MD   5 mg at 06/10/21 0836   • atorvastatin (LIPITOR) tablet 80 mg  80 mg Oral Nightly Yordy Goncalves MD   80 mg at 06/09/21 2148   • cefTRIAXone (ROCEPHIN) 1 g/100 mL 0.9% NS (MBP)  1 g Intravenous Q24H Yordy Goncalves MD   Stopped at 06/09/21 2230    And   • AZITHROMYCIN 500 MG/250 ML 0.9% NS IVPB (vial-mate)  500 mg Intravenous Q24H Yordy Goncalves MD   Stopped at 06/10/21 0030   • dextrose (D50W) 25 g/ 50mL Intravenous Solution 25 g  25 g Intravenous Q15 Min PRN Yordy Goncalves MD       • dextrose (GLUTOSE) oral gel 15 g  15 g Oral Q15 Min PRN Yordy Goncalves MD       • dilTIAZem CD (CARDIZEM CD) 24 hr capsule 120 mg  120 mg Oral Daily Yordy Goncalves MD   120 mg at 06/10/21 0837   • gabapentin (NEURONTIN) capsule 600 mg  600 mg Oral TID Yordy Goncalves MD   600 mg at 06/10/21 0837   • glucagon (human recombinant) (GLUCAGEN DIAGNOSTIC) injection 1 mg  1 mg Subcutaneous Q15 Min PRN Yordy Goncalves MD       • insulin aspart (novoLOG) injection 0-24 Units  0-24 Units Subcutaneous TID AC Yordy Goncalves MD   4 Units at 06/10/21 0836   • insulin aspart (novoLOG) injection 20 Units  20 Units Subcutaneous TID With  Meals Yordy Goncalves MD   20 Units at 06/10/21 0837   • insulin detemir (LEVEMIR) injection 65 Units  65 Units Subcutaneous Nightly Susy White MD   65 Units at 06/09/21 2148   • ipratropium-albuterol (DUO-NEB) nebulizer solution 3 mL  3 mL Nebulization Q4H PRN Judy Crews MD   3 mL at 06/09/21 1903   • ipratropium-albuterol (DUO-NEB) nebulizer solution 3 mL  3 mL Nebulization TID - RT Simon Roldan MD   3 mL at 06/10/21 0639   • isosorbide mononitrate (IMDUR) 24 hr tablet 120 mg  120 mg Oral QAM Yordy Goncalves MD   120 mg at 06/10/21 0617   • lisinopril (PRINIVIL,ZESTRIL) tablet 40 mg  40 mg Oral Nightly Yordy Goncalves MD   40 mg at 06/09/21 2148   • methocarbamol (ROBAXIN) tablet 500 mg  500 mg Oral 4x Daily Yordy Goncalves MD   500 mg at 06/10/21 0837   • metoprolol succinate XL (TOPROL-XL) 24 hr tablet 50 mg  50 mg Oral Daily Yordy Goncalves MD   50 mg at 06/10/21 0837   • nitroglycerin (NITROSTAT) SL tablet 0.4 mg  0.4 mg Sublingual Q5 Min PRN Yordy Goncalves MD       • pantoprazole (PROTONIX) EC tablet 40 mg  40 mg Oral Nightly Yordy Goncalves MD   40 mg at 06/09/21 2148   • Pharmacy to dose warfarin   Does not apply Continuous PRN Yordy Goncalves MD       • pramipexole (MIRAPEX) tablet 2 mg  2 mg Oral Nightly Yordy Goncalves MD   2 mg at 06/09/21 2147   • prasugrel (EFFIENT) tablet 10 mg  10 mg Oral Daily Yordy Goncalves MD   10 mg at 06/10/21 0837   • ranolazine (RANEXA) 12 hr tablet 1,000 mg  1,000 mg Oral Q12H Yordy Goncalves MD   1,000 mg at 06/10/21 0836   • sodium chloride 0.9 % flush 10 mL  10 mL Intravenous PRN Yordy Goncalves MD   10 mL at 06/08/21 1625   • sodium chloride 0.9 % flush 10 mL  10 mL Intravenous Q12H Yordy Goncalves MD   10 mL at 06/09/21 2146   • sodium chloride 0.9 % flush 10 mL  10 mL Intravenous PRN Yordy Goncalves MD       • sodium chloride 0.9 % infusion  75 mL/hr Intravenous Continuous Susy White MD 75 mL/hr at  06/09/21 2148 75 mL/hr at 06/09/21 2148   • traZODone (DESYREL) tablet 300 mg  300 mg Oral Nightly Yordy Goncalves MD   300 mg at 06/09/21 2147       Review of Systems:   Review of Systems   Constitutional: Positive for fatigue. Negative for fever.   Respiratory: Positive for cough, shortness of breath and wheezing.    Cardiovascular: Negative for chest pain, palpitations and leg swelling.   Gastrointestinal: Negative for abdominal distention, abdominal pain, blood in stool, diarrhea and vomiting.   Genitourinary: Negative for dysuria, enuresis, frequency, hematuria, scrotal swelling and urgency.   Musculoskeletal: Positive for arthralgias.   Skin: Positive for pallor. Negative for color change and rash.   Neurological: Positive for weakness. Negative for seizures and syncope.   Psychiatric/Behavioral: Negative for agitation, behavioral problems and confusion.   All other systems reviewed and are negative.      Objective     Vital Signs  Temp:  [97.1 °F (36.2 °C)-98.3 °F (36.8 °C)] 97.4 °F (36.3 °C)  Heart Rate:  [62-82] 67  Resp:  [18-24] 24  BP: (113-144)/(54-66) 121/63    Physical Exam:  Physical Exam  Vitals reviewed.   Constitutional:       Appearance: He is obese. He is ill-appearing.   HENT:      Head: Normocephalic and atraumatic.   Eyes:      General: No scleral icterus.     Extraocular Movements: Extraocular movements intact.      Pupils: Pupils are equal, round, and reactive to light.   Cardiovascular:      Rate and Rhythm: Normal rate and regular rhythm.   Pulmonary:      Effort: No respiratory distress.      Breath sounds: Normal breath sounds. No stridor. No wheezing, rhonchi or rales.   Abdominal:      General: Bowel sounds are normal. There is no distension.      Palpations: Abdomen is soft. There is no mass.      Tenderness: There is no abdominal tenderness. There is no right CVA tenderness or left CVA tenderness.   Musculoskeletal:         General: No deformity. Normal range of motion.       Cervical back: Normal range of motion and neck supple.      Right lower leg: Edema present.      Left lower leg: Edema present.   Skin:     General: Skin is dry.      Coloration: Skin is not jaundiced or pale.      Findings: No bruising or lesion.   Neurological:      General: No focal deficit present.      Mental Status: He is alert and oriented to person, place, and time.      Cranial Nerves: No cranial nerve deficit.      Motor: No weakness.      Gait: Gait normal.   Psychiatric:         Mood and Affect: Mood normal.         Behavior: Behavior normal.          Results Review:    Lab Results (last 24 hours)     Procedure Component Value Units Date/Time    Blood Culture - Blood, Blood, PICC Line [[id number]]  (Abnormal) Collected: 06/09/21 1224    Specimen: Blood, PICC Line Updated: 06/10/21 1106     Blood Culture Abnormal Stain     Gram Stain Aerobic Bottle Gram positive cocci in clusters     Comment: 1 positive of 4 drawn for gram positive cocci        Blood Culture ID, PCR - Blood, Blood, PICC Line [[id number]]  (Abnormal) Collected: 06/09/21 1224    Specimen: Blood, PICC Line Updated: 06/10/21 1106     BCID, PCR Staphylococcus species, not aureus. mecA (methicillin resistance gene) detected. Identification by BCID PCR.    POC Glucose Once [[id number]]  (Abnormal) Collected: 06/10/21 1004    Specimen: Blood Updated: 06/10/21 1033     Glucose 247 mg/dL      Comment: RN NotifiedOperator: [id number] QUYNH DALTONNIFERMeter ID: [id number]       Respiratory Culture - Sputum, Alveoli [[id number]] Collected: 06/09/21 0717    Specimen: Sputum from Alveoli Updated: 06/10/21 1000     Respiratory Culture Scant growth (1+) Normal Respiratory Radha: NO S.aureus/MRSA or Pseudomonas aeruginosa     Gram Stain Many (4+) WBCs seen      No epithelial cells seen      Mixed bacterial radha    Blood Culture - Blood, Arm, Left [[id number]]  (Abnormal) Collected: 06/08/21 2106    Specimen: Blood from Arm, Left Updated: 06/10/21 0905      Blood Culture Staphylococcus, coagulase negative     Comment: Probable contaminant requires clinical correlation, susceptibility not performed unless requested by physician.          Isolated from Aerobic Bottle     Gram Stain Aerobic Bottle Gram positive cocci in clusters     Comment: 1 BOTTLE POSITIVE OF 4 COLLECTED        Protime-INR [[id number]]  (Normal) Collected: 06/10/21 0727    Specimen: Blood Updated: 06/10/21 0903     Protime 14.1 Seconds      INR 1.05    Narrative:      Therapeutic range for most indications is 2.0-3.0 INR,  or 2.5-3.5 for mechanical heart valves.    Basic Metabolic Panel [[id number]]  (Abnormal) Collected: 06/10/21 0727    Specimen: Blood Updated: 06/10/21 0902     Glucose 184 mg/dL      BUN 23 mg/dL      Creatinine 1.22 mg/dL      Sodium 129 mmol/L      Potassium 3.9 mmol/L      Chloride 98 mmol/L      CO2 26.0 mmol/L      Calcium 8.8 mg/dL      eGFR Non African Amer 65 mL/min/1.73      BUN/Creatinine Ratio 18.9     Anion Gap 5.0 mmol/L     Narrative:      GFR Normal >60  Chronic Kidney Disease <60  Kidney Failure <15      CBC & Differential [[id number]]  (Abnormal) Collected: 06/10/21 0727    Specimen: Blood Updated: 06/10/21 0833    Narrative:      The following orders were created for panel order CBC & Differential.  Procedure                               Abnormality         Status                     ---------                               -----------         ------                     CBC Auto Differential[[id number]]        Abnormal            Final result                 Please view results for these tests on the individual orders.    CBC Auto Differential [[id number]]  (Abnormal) Collected: 06/10/21 0727    Specimen: Blood Updated: 06/10/21 0833     WBC 6.54 10*3/mm3      RBC 4.32 10*6/mm3      Hemoglobin 11.9 g/dL      Hematocrit 35.6 %      MCV 82.4 fL      MCH 27.5 pg      MCHC 33.4 g/dL      RDW 14.6 %      RDW-SD 43.1 fl      MPV 9.3 fL      Platelets 163 10*3/mm3       Neutrophil % 51.0 %      Lymphocyte % 29.7 %      Monocyte % 13.0 %      Eosinophil % 4.4 %      Basophil % 0.5 %      Immature Grans % 1.4 %      Neutrophils, Absolute 3.34 10*3/mm3      Lymphocytes, Absolute 1.94 10*3/mm3      Monocytes, Absolute 0.85 10*3/mm3      Eosinophils, Absolute 0.29 10*3/mm3      Basophils, Absolute 0.03 10*3/mm3      Immature Grans, Absolute 0.09 10*3/mm3      nRBC 0.0 /100 WBC     POC Glucose Once [[id number]]  (Abnormal) Collected: 06/10/21 0625    Specimen: Blood Updated: 06/10/21 0646     Glucose 166 mg/dL      Comment: RN NotifiedOperator: [id number] WILMER SIMONSMeter ID: [id number]       Respiratory Panel, PCR (WITHOUT COVID) - Swab, Nasopharynx [[id number]]  (Abnormal) Collected: 06/09/21 2200    Specimen: Swab from Nasopharynx Updated: 06/09/21 2310     ADENOVIRUS, PCR Not Detected     Coronavirus 229E Not Detected     Coronavirus HKU1 Not Detected     Coronavirus NL63 Not Detected     Coronavirus OC43 Detected     Human Metapneumovirus Not Detected     Human Rhinovirus/Enterovirus Not Detected     Influenza B PCR Not Detected     Parainfluenza Virus 1 Not Detected     Parainfluenza Virus 2 Not Detected     Parainfluenza Virus 3 Detected     Parainfluenza Virus 4 Not Detected     Bordetella pertussis pcr Not Detected     Chlamydophila pneumoniae PCR Not Detected     Mycoplasma pneumo by PCR Not Detected     Influenza A PCR Not Detected     RSV, PCR Not Detected     Bordetella parapertussis PCR Not Detected    Narrative:      The coronavirus on the RVP is NOT COVID-19 and is NOT indicative of infection with COVID-19.    In the setting of a positive respiratory panel with a viral infection PLUS a negative procalcitonin without other underlying concern for bacterial infection, consider observing off antibiotics or discontinuation of antibiotics and continue supportive care. If the respiratory panel is positive for atypical bacterial infection (Bordetella pertussis, Chlamydophila  pneumoniae, or Mycoplasma pneumoniae), consider antibiotic de-escalation to target atypical bacterial infection.    Blood Culture - Blood, Arm, Left [[id number]] Collected: 06/08/21 2145    Specimen: Blood from Arm, Left Updated: 06/09/21 2200     Blood Culture No growth at 24 hours    Legionella Antigen, Urine - Urine, Urine, Clean Catch [[id number]]  (Normal) Collected: 06/09/21 1056    Specimen: Urine, Clean Catch Updated: 06/09/21 2137     LEGIONELLA ANTIGEN, URINE Negative    POC Glucose Once [[id number]]  (Normal) Collected: 06/09/21 2022    Specimen: Blood Updated: 06/09/21 2133     Glucose 129 mg/dL      Comment: RN NotifiedOperator: [id number] WILMER SARAHMeter ID: [id number]       Blood Culture ID, PCR - Blood, Arm, Left [[id number]]  (Abnormal) Collected: 06/08/21 2106    Specimen: Blood from Arm, Left Updated: 06/09/21 1754     BCID, PCR Staphylococcus spp, not aureus. Identification by BCID PCR.    Narrative:      Sensitivity to Follow          Imaging Results (Last 24 Hours)     ** No results found for the last 24 hours. **          Assessment/Plan       Right lower lobe pneumonia    Uncontrolled hypertension    ADOLFO on CPAP    Chronic pulmonary embolism (CMS/HCC)    Morbid obesity (CMS/HCC)    Diabetes mellitus (CMS/HCC)    Hyponatremia    Continue with the supplemental oxygen if necessary, nebulized treatments, IV antibiotics. The blood cultures identified Staph species (not aureus) in 1/4 bottles (possible contaminant).    Continue with the Coumadin for the chronic pulmonary embolism, the CPAP at night.    His blood pressure values are much better controlled.    I confirmed that the patient's Advance Care Plan is present, code status is documented, or surrogate decision maker is listed in the patient's medical record.      Rehan Griffiths MD  06/10/21  12:25 CDT

## 2021-06-10 NOTE — PLAN OF CARE
Problem: Adult Inpatient Plan of Care  Goal: Plan of Care Review  Outcome: Ongoing, Progressing  Flowsheets (Taken 6/10/2021 6493)  Progress: no change  Plan of Care Reviewed With: patient   Goal Outcome Evaluation:  Plan of Care Reviewed With: patient        Progress: no change

## 2021-06-10 NOTE — NURSING NOTE
RN entered to assess patient and check glucose. Glucose reading only 281. RN turned room light on and patient stated he was fine now that the light was on. MD notified

## 2021-06-10 NOTE — PAYOR COMM NOTE
"Estrella Quintero  [facility]  P: [phone]  F: [phone]    Ref#[id number]      Keara Escobar (40 y.o. Male)     Date of Birth Social Security Number Address Home Phone MRN    1980  [id number]  [address]  [facility] [address] [phone] [id number]    [employer] Mar[employer] Status          None Single       Admission Date Admission Type Admitting Provider Attending Provider Department, Room/Bed    6/8/21 Emergency Melchor Blackwell MD Echendu, Anthony W, MD [facility] [place], 303/1    Discharge Date Discharge Disposition Discharge Destination                       Attending Provider: Melchor Blackwell MD    Allergies: Glipizide, Reglan [Metoclopramide], Tramadol, Risperidone    Isolation: Droplet   Infection: Other (06/10/21)   Code Status: CPR    Ht: 180.3 cm (71\")   Wt: 199 kg (438 lb)    Admission Cmt: None   Principal Problem: Right lower lobe pneumonia [J18.9]                 Active Insurance as of 6/8/2021     Primary Coverage     Payor Plan Insurance Group Employer/Plan Group    ANTHEM MEDICARE REPLACEMENT ANTHEM MEDICARE ADVANTAGE [id number]     Payor Plan Address Payor Plan Phone Number Payor Plan Fax Number Effective Dates    [address] [phone]  8/1/2019 - None Entered    [facility] [id number]       Subscriber Name Subscriber Birth Date Member ID       KEARA ESCOBAR 1980 [id number]           Secondary Coverage     Payor Plan Insurance Group Employer/Plan Group    [place]      Payor Plan Address Payor Plan Phone Number Payor Plan Fax Number Effective Dates    [address] [phone]  9/1/2018 - None Entered    [facility] [id number]       Subscriber Name Subscriber Birth Date Member ID       KEARA ESCOBAR 1980 [id number]                 Emergency Contacts      (Rel.) Home Phone Work Phone Mobile Phone    Hanna Escobar (Father) [phone] -- [phone]               History & Physical     "   Yordy Goncalves MD at 06/08/21 1917                [facility] Medicine Admission      Date of Admission: 6/8/2021      Primary Care Physician: Mami Perez APRN      Chief Complaint: Chest pain and shortness of breath    HPI: Patient is a 40-year-old male who is well-known to our service who presented to the emergency department with 3 days of chest pain that is much worse with exertion, shortness of breath worse with exertion, and productive cough.  He denies fever, nausea, vomiting, and diarrhea.  He states the chest pain that he has is generally sharp and persistently with breathing, but it is much worse with deep breath.    Concurrent Medical History:  has a past medical history of Asthma, CAD (coronary artery disease), Chronic back pain, Chronic pulmonary embolism (CMS/HCC), Coronary artery disease, Diabetes mellitus (CMS/HCC), Factor II deficiency (CMS/HCC), Fatty liver, GERD (gastroesophageal reflux disease), Hyperlipidemia, Hypertension, Morbid obesity (CMS/HCC), RLS (restless legs syndrome), Seizures (CMS/HCC), and Sleep apnea.    Past Surgical History:  has a past surgical history that includes transesophageal echocardiogram (travis); pr rt/lt heart catheters (N/A, 3/15/2017); Cardiac catheterization (N/A, 3/15/2017); Cardiac catheterization (N/A, 3/15/2017); Coronary angioplasty with stent; Cardiac catheterization (N/A, 3/1/2018); Cholecystectomy; and Cardiac catheterization (N/A, 9/2/2020).    Family History: family history includes Cancer in his paternal grandfather; Heart disease in his mother; Obesity in his mother; Sleep apnea in his father.     Social History:  reports that he quit smoking about 24 years ago. His smoking use included cigarettes. He has a 0.13 pack-year smoking history. His smokeless tobacco use includes snuff. He reports that he does not drink alcohol and does not use drugs.    Allergies:   Allergies   Allergen Reactions   •  Glipizide Other (See Comments) and Unknown (See Comments)     Slurred Speech  Slurred Speech  Hallucinations, Slurred Speech   • Reglan [Metoclopramide] Anxiety   • Tramadol Other (See Comments)     seizures   • Risperidone Other (See Comments)     Slurred speech  Can't stand, trouble breathing, slurred speech         Medications:   Prior to Admission medications    Medication Sig Start Date End Date Taking? Authorizing Provider   albuterol (PROVENTIL HFA;VENTOLIN HFA) 108 (90 BASE) MCG/ACT inhaler Inhale 2 puffs Every 4 (Four) Hours As Needed for Wheezing.    Provider, MD Latrice   amLODIPine (NORVASC) 5 MG tablet Take 1 tablet by mouth Daily. 4/2/21   Dinah Lin MD   atorvastatin (LIPITOR) 80 MG tablet Take 1 tablet by mouth Every Night. 9/29/20   Mami Perez APRN   azithromycin (Zithromax Z-Luca) 250 MG tablet Take 2 tabs on Day 1, then 1 tab daily for 4 additional days 4/15/21   Elyssa Penn APRN   dilTIAZem CD (Cardizem CD) 120 MG 24 hr capsule Take 1 capsule by mouth Daily. 10/8/20   Judy Crews MD   Evolocumab (Repatha SureClick) solution auto-injector SureClick injection Inject 1 mL under the skin into the appropriate area as directed Every 14 (Fourteen) Days. 4/8/21   Mami Perez APRN   fenofibrate micronized (LOFIBRA) 134 MG capsule Take 1 capsule by mouth Every Morning Before Breakfast. 9/29/20   Mami Perez APRN   gabapentin (NEURONTIN) 300 MG capsule Take 2 capsules by mouth 3 (Three) Times a Day. 6/3/21   Mami Perez APRN   glucose blood test strip Use as instructed 1/8/21   Mami Perez APRN   glucose monitor monitoring kit 1 each As Needed (check blood glucose 3x daily). 9/29/20   Mami Perez APRN   insulin glargine (LANTUS) 100 UNIT/ML injection Inject 60 Units under the skin into the appropriate area as directed Every Night. 9/29/20   Mami Perez APRN   insulin lispro (HumaLOG) 100 UNIT/ML  "injection Inject 15 Units under the skin into the appropriate area as directed 3 (Three) Times a Day Before Meals.  Patient taking differently: Inject 25 Units under the skin into the appropriate area as directed 3 (Three) Times a Day Before Meals. 9/29/20   Mami Perez APRN   isosorbide mononitrate (IMDUR) 120 MG 24 hr tablet Take 1 tablet by mouth Every Morning for 30 days. 10/10/17 9/29/21  Minesh Gregg MD   lisinopril (PRINIVIL,ZESTRIL) 40 MG tablet Take 1 tablet by mouth Every Night. 9/29/20   Mami Perez APRN   methocarbamol (Robaxin) 500 MG tablet Take 1 tablet by mouth 4 (Four) Times a Day. 2/2/21   Mami Perez APRN   methylPREDNISolone (MEDROL) 4 MG dose pack Take as directed on package instructions. 4/15/21   Elyssa Penn APRN   metoprolol succinate XL (TOPROL-XL) 50 MG 24 hr tablet Take 1 tablet by mouth Daily. 9/29/20   Mami Perez APRN   Microlet Lancets misc One touch delica lancets 1/8/21   Mami Perez APRN   nitroglycerin (NITROSTAT) 0.4 MG SL tablet Place 1 tablet under the tongue Every 5 (Five) Minutes As Needed for Chest Pain for up to 15 days. 10/10/17 9/29/21  Minesh Gregg MD   pantoprazole (PROTONIX) 40 MG EC tablet Take 1 tablet by mouth Every Night. 9/29/20   Mami Perez APRN   pramipexole (MIRAPEX) 1 MG tablet Take 2 tablets by mouth Every Night. 9/29/20   Mami Perez APRN   prasugrel (EFFIENT) 10 MG tablet Take 1 tablet by mouth Daily. 9/29/20   Mami Perez APRN   ranolazine (RANEXA) 1000 MG 12 hr tablet Take 1 tablet by mouth Every 12 (Twelve) Hours. 2/2/21   Mami Perez APRN   ReliOn Insulin Syringe 31G X 15/64\" 0.5 ML misc For use with insulin 9/29/20   Mami Perez APRN   SITagliptin (Januvia) 100 MG tablet Take 1 tablet by mouth Daily. 1/8/21   Mami Perez APRN   traZODone (DESYREL) 300 MG tablet Take 1 tablet by mouth every night at bedtime. " 9/29/20   Mami Perez APRN   warfarin (Coumadin) 7.5 MG tablet Take 1 tablet nightly or as directed by [facility] 4/9/21   Silvestre Woodward APRN       Review of Systems:  Review of Systems   Constitutional: Positive for activity change and fatigue. Negative for appetite change, chills, fever and unexpected weight change.   HENT: Negative for congestion, facial swelling, hearing loss, nosebleeds, rhinorrhea, sneezing, trouble swallowing and voice change.    Eyes: Negative for photophobia and visual disturbance.   Respiratory: Positive for shortness of breath. Negative for apnea, cough, choking, chest tightness, wheezing and stridor.    Cardiovascular: Positive for chest pain. Negative for palpitations and leg swelling.   Gastrointestinal: Negative for abdominal pain, blood in stool, constipation, diarrhea, nausea and vomiting.   Endocrine: Negative for cold intolerance, heat intolerance, polydipsia, polyphagia and polyuria.   Genitourinary: Negative for dysuria, flank pain and hematuria.   Musculoskeletal: Negative for arthralgias, back pain, myalgias and neck pain.   Skin: Negative for rash and wound.   Allergic/Immunologic: Negative for immunocompromised state.   Neurological: Negative for dizziness, seizures, syncope, speech difficulty, weakness, light-headedness, numbness and headaches.   Hematological: Does not bruise/bleed easily.   Psychiatric/Behavioral: Negative for agitation, behavioral problems, confusion, decreased concentration, hallucinations, self-injury and suicidal ideas. The patient is not nervous/anxious.       Otherwise complete ROS is negative except as mentioned above.    Physical Exam:   Temp:  [97.8 °F (36.6 °C)] 97.8 °F (36.6 °C)  Heart Rate:  [] 96  Resp:  [26] 26  BP: (132-201)/(78-96) 139/84     Physical Exam  Constitutional:       Appearance: He is well-developed. He is morbidly obese. He is ill-appearing.      Comments: BMI 60.61   HENT:      Head: Normocephalic  and atraumatic.      Nose: Nose normal.   Eyes:      General: Lids are normal. No scleral icterus.     Conjunctiva/sclera: Conjunctivae normal.      Pupils: Pupils are equal, round, and reactive to light.   Neck:      Vascular: No JVD.      Trachea: No tracheal tenderness or tracheal deviation.   Cardiovascular:      Rate and Rhythm: Normal rate and regular rhythm.      Pulses: Normal pulses.      Heart sounds: Normal heart sounds, S1 normal and S2 normal. No murmur heard.   No friction rub. No gallop.    Pulmonary:      Effort: Pulmonary effort is normal. No accessory muscle usage or respiratory distress.      Breath sounds: Decreased breath sounds and rhonchi present. No wheezing or rales.   Chest:      Chest wall: No tenderness.   Abdominal:      General: Bowel sounds are normal. There is no distension.      Palpations: Abdomen is soft. There is no mass.      Tenderness: There is no abdominal tenderness. There is no guarding or rebound.   Musculoskeletal:         General: No tenderness.      Cervical back: Normal range of motion and neck supple. No spinous process tenderness.      Right lower leg: Edema present.      Left lower leg: Edema present.   Skin:     General: Skin is warm.      Coloration: Skin is not pale.      Findings: No rash.   Neurological:      Mental Status: He is alert and oriented to person, place, and time.      Cranial Nerves: No cranial nerve deficit.      Sensory: No sensory deficit.      Motor: No atrophy, abnormal muscle tone or seizure activity.      Coordination: Coordination normal.      Deep Tendon Reflexes: Reflexes are normal and symmetric. Reflexes normal.   Psychiatric:         Behavior: Behavior normal.         Thought Content: Thought content normal.         Judgment: Judgment normal.       Results Reviewed:  I have personally reviewed current lab, radiology, and data and agree with results.  Lab Results (last 24 hours)     Procedure Component Value Units Date/Time     Urinalysis, Microscopic Only - Urine, Clean Catch [[id number]] Collected: 06/08/21 1756    Specimen: Urine, Clean Catch Updated: 06/08/21 1915     RBC, UA None Seen /HPF      WBC, UA 0-2 /HPF      Bacteria, UA None Seen /HPF      Squamous Epithelial Cells, UA 0-2 /HPF      Hyaline Casts, UA None Seen /LPF      Methodology Manual Light Microscopy    COVID-19 and FLU A/B PCR - Swab, Nasopharynx [[id number]]  (Normal) Collected: 06/08/21 1816    Specimen: Swab from Nasopharynx Updated: 06/08/21 1852     COVID19 Not Detected     Influenza A PCR Not Detected     Influenza B PCR Not Detected    Narrative:      Fact sheet for providers: https://www.fda.gov/media/050169/download    Fact sheet for patients: https://www.fda.gov/media/969105/download    Test performed by PCR.    Blood Gas, Venous - [[id number]]  (Abnormal) Collected: 06/08/21 1802    Specimen: Venous Blood Updated: 06/08/21 1841     Site Venous     pH, Venous 7.403 pH Units      pCO2, Venous 45.3 mm Hg      pO2, Venous 63.5 mm Hg      HCO3, Venous 28.2 mmol/L      Base Excess, Venous 2.8 mmol/L      Barometric Pressure for Blood Gas --     Comment: PATM not performed at this facility.        Modality N/A    Troponin [[id number]]  (Normal) Collected: 06/08/21 1802    Specimen: Blood Updated: 06/08/21 1829     Troponin T <0.010 ng/mL     Narrative:      Troponin T Reference Range:  <= 0.03 ng/mL-   Negative for AMI  >0.03 ng/mL-     Abnormal for myocardial necrosis.  Clinicians would have to utilize clinical acumen, EKG, Troponin and serial changes to determine if it is an Acute Myocardial Infarction or myocardial injury due to an underlying chronic condition.       Results may be falsely decreased if patient taking Biotin.      Blood Gas, Venous With Co-Ox [[id number]]  (Abnormal) Collected: 06/08/21 1800    Specimen: Venous Blood Updated: 06/08/21 1811     Site OTHER     pH, Venous 7.403 pH Units      pCO2, Venous 45.3 mm Hg      pO2, Venous 63.5 mm Hg       Comment: 83 Value above reference range        HCO3, Venous 28.2 mmol/L      Comment: 83 Value above reference range        Base Excess, Venous 2.8 mmol/L      Comment: 83 Value above reference range        O2 Saturation, Venous 92.7 %      Comment: 83 Value above reference range        Hemoglobin, Blood Gas 14.7 g/dL      Oxyhemoglobin Venous 90.5 %      Comment: 83 Value above reference range        Methemoglobin Venous 0.9 %      Carboxyhemoglobin Venous 1.4 %      Sodium, Venous 134 mmol/L      Comment: 84 Value below reference range        Potassium, Venous 4.2 mmol/L      Barometric Pressure for Blood Gas 747 mmHg      Modality Room Air     Ventilator Mode NA     Note --     Collected by RN     Comment: Meter: [id number]     :  [id number]       Urinalysis With Microscopic If Indicated (No Culture) - Urine, Clean Catch [[id number]]  (Abnormal) Collected: 06/08/21 1756    Specimen: Urine, Clean Catch Updated: 06/08/21 1808     Color, UA Yellow     Appearance, UA Clear     pH, UA 5.5     Specific [place], UA 1.039     Comment: Result obtained by Refractometer        Glucose, UA >=1000 mg/dL (3+)     Ketones, UA Negative     Bilirubin, UA Negative     Blood, UA Negative     Protein,  mg/dL (2+)     Leuk Esterase, UA Negative     Nitrite, UA Negative     Urobilinogen, UA 1.0 E.U./dL    Extra Tubes [[id number]] Collected: 06/08/21 1633    Specimen: Blood Updated: 06/08/21 1746    Narrative:      The following orders were created for panel order Extra Tubes.  Procedure                               Abnormality         Status                     ---------                               -----------         ------                     Lavender Top[[id number]]                                     Final result               Green Top (Gel)[[id number]]                                  Final result                 Please view results for these tests on the individual orders.    Lavender Top [[id number]] Collected:  06/08/21 1633    Specimen: Blood Updated: 06/08/21 1746     Extra Tube hold for add-on     Comment: Auto resulted       Green Top (Gel) [[id number]] Collected: 06/08/21 1633    Specimen: Blood Updated: 06/08/21 1746     Extra Tube Hold for add-ons.     Comment: Auto resulted.       [place] Draw [[id number]] Collected: 06/08/21 1620    Specimen: Blood Updated: 06/08/21 1745    Narrative:      The following orders were created for panel order [place] Draw.  Procedure                               Abnormality         Status                     ---------                               -----------         ------                     Light Blue Top[[id number]]                                   Final result               Green Top (Gel)[[id number]]                                  Final result               Lavender Top[[id number]]                                     Final result               Gold Top - SST[[id number]]                                   Final result                 Please view results for these tests on the individual orders.    Light Blue Top [[id number]] Collected: 06/08/21 1633    Specimen: Blood Updated: 06/08/21 1745     Extra Tube hold for add-on     Comment: Auto resulted       Gold Top - SST [[id number]] Collected: 06/08/21 1633    Specimen: Blood Updated: 06/08/21 1745     Extra Tube Hold for add-ons.     Comment: Auto resulted.       Acetone [[id number]]  (Normal) Collected: 06/08/21 1633    Specimen: Blood Updated: 06/08/21 1737     Acetone Negative    Magnesium [[id number]]  (Abnormal) Collected: 06/08/21 1633    Specimen: Blood Updated: 06/08/21 1734     Magnesium 1.5 mg/dL     Lavender Top [[id number]] Collected: 06/08/21 1620    Specimen: Blood Updated: 06/08/21 1730     Extra Tube hold for add-on     Comment: Auto resulted       Green Top (Gel) [[id number]] Collected: 06/08/21 1620    Specimen: Blood Updated: 06/08/21 1730     Extra Tube Hold for add-ons.     Comment: Auto resulted.       Comprehensive  Metabolic Panel [[id number]]  (Abnormal) Collected: 06/08/21 1620    Specimen: Blood Updated: 06/08/21 1719     Glucose 432 mg/dL      BUN 12 mg/dL      Creatinine 0.82 mg/dL      Sodium 129 mmol/L      Potassium 4.5 mmol/L      Chloride 94 mmol/L      CO2 19.0 mmol/L      Calcium 9.2 mg/dL      Total Protein 7.1 g/dL      Albumin 3.30 g/dL      ALT (SGPT) 20 U/L      AST (SGOT) 24 U/L      Alkaline Phosphatase 87 U/L      Total Bilirubin 0.4 mg/dL      eGFR Non African Amer 103 mL/min/1.73      Globulin 3.8 gm/dL      A/G Ratio 0.9 g/dL      BUN/Creatinine Ratio 14.6     Anion Gap 16.0 mmol/L     Narrative:      GFR Normal >60  Chronic Kidney Disease <60  Kidney Failure <15      CBC & Differential [[id number]]  (Abnormal) Collected: 06/08/21 1620    Specimen: Blood Updated: 06/08/21 1702    Narrative:      The following orders were created for panel order CBC & Differential.  Procedure                               Abnormality         Status                     ---------                               -----------         ------                     Scan Slide[[id number]]                   Normal              Final result               CBC Auto Differential[[id number]]        Abnormal            Final result                 Please view results for these tests on the individual orders.    Scan Slide [[id number]]  (Normal) Collected: 06/08/21 1620    Specimen: Blood Updated: 06/08/21 1702     RBC Morphology Normal     WBC Morphology Normal     Platelet Morphology Normal    Protime-INR [[id number]]  (Normal) Collected: 06/08/21 1633    Specimen: Blood Updated: 06/08/21 1658     Protime 13.5 Seconds      INR 0.99    Narrative:      Therapeutic range for most indications is 2.0-3.0 INR,  or 2.5-3.5 for mechanical heart valves.    Troponin [[id number]]  (Normal) Collected: 06/08/21 1620    Specimen: Blood Updated: 06/08/21 1651     Troponin T <0.010 ng/mL     Narrative:      Troponin T Reference Range:  <= 0.03 ng/mL-   Negative  for AMI  >0.03 ng/mL-     Abnormal for myocardial necrosis.  Clinicians would have to utilize clinical acumen, EKG, Troponin and serial changes to determine if it is an Acute Myocardial Infarction or myocardial injury due to an underlying chronic condition.       Results may be falsely decreased if patient taking Biotin.      BNP [[id number]]  (Normal) Collected: 21 162    Specimen: Blood Updated: 21 1649     proBNP 46.9 pg/mL     Narrative:      Among patients with dyspnea, NT-proBNP is highly sensitive for the detection of acute congestive heart failure. In addition NT-proBNP of <300 pg/ml effectively rules out acute congestive heart failure with 99% negative predictive value.    Results may be falsely decreased if patient taking Biotin.      CBC Auto Differential [[id number]]  (Abnormal) Collected: 21    Specimen: Blood Updated: 21 1634     WBC 7.51 10*3/mm3      RBC 5.16 10*6/mm3      Hemoglobin 14.5 g/dL      Hematocrit 41.9 %      MCV 81.2 fL      MCH 28.1 pg      MCHC 34.6 g/dL      RDW 13.9 %      RDW-SD 40.4 fl      MPV 9.2 fL      Platelets 178 10*3/mm3      Neutrophil % 66.5 %      Lymphocyte % 18.4 %      Monocyte % 10.1 %      Eosinophil % 3.6 %      Basophil % 0.3 %      Immature Grans % 1.1 %      Neutrophils, Absolute 5.00 10*3/mm3      Lymphocytes, Absolute 1.38 10*3/mm3      Monocytes, Absolute 0.76 10*3/mm3      Eosinophils, Absolute 0.27 10*3/mm3      Basophils, Absolute 0.02 10*3/mm3      Immature Grans, Absolute 0.08 10*3/mm3      nRBC 0.0 /100 WBC         Imaging Results (Last 24 Hours)     Procedure Component Value Units Date/Time    CT Angiogram Chest [[id number]] Collected: 21 173     Updated: 21 175    Narrative:      PROCEDURE: CT ANGIOGRAM CHEST      .        EXAM:  Computed Tomography with CTA         REGION:  Chest         INDICATION:   Chest pain, shortness of breath  - rule out  pulmonary embolism         CORRELATIVE IMAGIN2021                           TECHNIQUE:             - PE / vascular protocol               - reconstructions:  axial, coronal, sagittal, obliques     - computer-generated 3D reconstructions (MIPS) were performed.                - contrast:  intravenous 74 mL of Isovue-370                         This exam was performed according to our departmental  dose-optimization program, which includes automated exposure  control, adjustment of the mA and/or kV according to patient size  and/or use of iterative reconstruction technique.  DLP is 1967.9              COMMENTS:                               - Pulmonary arterial system:      - Main pulmonary artery trunk:  negative      - Left, right main pulmonary arteries: negative      - Lobar arteries: negative      - Segmental arteries: Limited evaluation of segmental  arteries due to contrast phase       - Systemic vascularity (as visualized):        - Aorta:  grossly negative / normal caliber / no dissection        - roots of great vessels:  grossly negative / normal caliber         - SVC / IVC:  grossly negative / normal caliber         - Misc (limited visualization):      - pulmonary parenchyma:  New, patchy airspace opacification  in the right lower lobe      - pleura:  negative      - mediastinal / glendy:  negative      - neck, inferior:  grossly wnl      - subdiaphragmatic structures: Status post cholecystectomy-  osseous:  Old healed left-sided rib fractures are present, as  previously      - misc:  Small hiatal hernia      .      Impression:      1.  No evidence of pulmonary embolism, with the segmental  branches less than optimally evaluated due to contrast phase no  large, central filling defect identified.  2.  No evidence of pathology associated with the visualized  aorta.      3. New patchy airspace opacification in the right lower lobe,  suspicious for pneumonia. Follow-up to complete radiographic  resolution recommended    Electronically signed by:  Gela Shaffer MD  6/8/2021  5:56 PM CDT  Workstation: [id number]            Assessment:    Active Hospital Problems    Diagnosis    • Pneumonia of right lower lobe due to infectious organism    • Paroxysmal atrial fibrillation (CMS/[facility])    • Hypertension    • Type 2 diabetes mellitus with hyperglycemia, with long-term current use of insulin (CMS/[facility])    • Chronic pulmonary embolism (CMS/[facility])    • RLS (restless legs syndrome)    • Hyperlipidemia    • Class 3 severe obesity due to excess calories with serious comorbidity and body mass index (BMI) of 60.0 to 69.9 in adult (CMS/[facility])              Plan:  1.  Right lower lobe pneumonia: Continue empiric antibiotics.  Covid screen negative.  2.  Dyspnea: Shortness of breath is likely worse due to pneumonia.  3.  Paroxysmal atrial fibrillation: Continue home medication.  4.  History of pulmonary embolism: Continue anticoagulation.  CTA negative for current pulmonary embolism.  5.  Diabetes mellitus: Poorly controlled.  Will place back on home long-acting insulin and sliding scale.  6.  Morbid obesity: BMI 60.61.  Lifestyle modification recommended.  7.  Obstructive sleep apnea: Continue CPAP.  8.  Coronary artery disease: Chest pain most likely pleuritic in nature.  Continue home medications.  9.  DVT prophylaxis: Home Coumadin.    Of note, patient states he is not been able to afford any of his medications.  States he has had no medications in the last 3 weeks at least.  Will consult case management.    I confirmed that the patient's Advance Care Plan is present, code status is documented, or surrogate decision maker is listed in the patient's medical record.     I have utilized all available immediate resources to obtain, update, or review the patient's current medications.     I discussed the patient's findings and my recommendations with: Patient        This document has been electronically signed by Yordy Goncalves MD on June 8, 2021 19:22 CDT                    Electronically signed by  Yordy Goncalves MD at 06/08/21 1944          Emergency Department Notes      Jamie Banks PA-C at 06/08/21 1602      Procedure Orders    1. ECG 12 Lead [[id number]] ordered by Jamie Banks PA-C          Attestation signed by Simon Roldan MD at 06/08/21 6891          For this patient encounter, I reviewed the NP or PA documentation, treatment plan, and medical decision making. Simon Roldan MD 6/8/2021 22:19 CDT                  Subjective   Patient presents to emergency department for chest pain and shortness of breath x 3 days, worsening.  Hx of CAD, prior PE, DM, Factor II deficiency, HERD, hyperlipidemia, hypertension, morbid obesity.  States he has been out of his medications for 3 weeks due to not being able to afford medication.  He was seen at [facility] ED in [place] yesterday and diagnosed with Pleurisy.        History provided by:  Patient   used: No        Review of Systems   Constitutional: Negative for chills and fever.   HENT: Negative for sore throat and trouble swallowing.    Eyes: Negative for visual disturbance.   Respiratory: Positive for shortness of breath. Negative for cough and wheezing.    Cardiovascular: Positive for chest pain. Negative for leg swelling.   Gastrointestinal: Negative for abdominal pain, nausea and vomiting.   Genitourinary: Negative for dysuria and flank pain.   Musculoskeletal: Negative for myalgias.   Skin: Negative for color change.   Neurological: Negative for dizziness, syncope, facial asymmetry, speech difficulty, weakness, numbness and headaches.   Psychiatric/Behavioral: Negative for confusion.       Past Medical History:   Diagnosis Date   • Asthma    • CAD (coronary artery disease)    • Chronic back pain    • Chronic pulmonary embolism (CMS/HCC)    • Coronary artery disease    • Diabetes mellitus (CMS/HCC)    • Factor II deficiency (CMS/HCC)    • Fatty liver    • GERD (gastroesophageal reflux disease)    •  "Hyperlipidemia    • Hypertension    • Morbid obesity (CMS/HCC)    • RLS (restless legs syndrome)    • Seizures (CMS/HCC)    • Sleep apnea        Allergies   Allergen Reactions   • Glipizide Other (See Comments) and Unknown (See Comments)     Slurred Speech  Slurred Speech  Hallucinations, Slurred Speech   • Reglan [Metoclopramide] Anxiety   • Tramadol Other (See Comments)     seizures   • Risperidone Other (See Comments)     Slurred speech  Can't stand, trouble breathing, slurred speech         Past Surgical History:   Procedure Laterality Date   • CARDIAC CATHETERIZATION N/A 3/15/2017   • CARDIAC CATHETERIZATION N/A 3/15/2017   • CARDIAC CATHETERIZATION N/A 3/1/2018   • CARDIAC CATHETERIZATION N/A 2020   • CHOLECYSTECTOMY     • CORONARY ANGIOPLASTY WITH STENT PLACEMENT     • LA RT/LT HEART CATHETERS N/A 3/15/2017   • TRANSESOPHAGEAL ECHOCARDIOGRAM ([place]ICA)         Family History   Problem Relation Age of Onset   • Heart disease Mother    • Obesity Mother    • Sleep apnea Father    • Cancer Paternal Grandfather        Social History     Socioeconomic History   • Marital status: Single     Spouse name: Cori   • Number of children: 0   • Years of education: Not on file   • Highest education level: Not on file   Tobacco Use   • Smoking status: Former Smoker     Packs/day: 0.25     Years: 0.50     Pack years: 0.12     Types: Cigarettes     Quit date:      Years since quittin.4   • Smokeless tobacco: Current User     Types: Snuff   Vaping Use   • Vaping Use: Never used   Substance and Sexual Activity   • Alcohol use: No   • Drug use: No   • Sexual activity: Not Currently           Objective      /84   Pulse 96   Temp 97.8 °F (36.6 °C) (Infrared)   Resp 26   Ht 180.3 cm (71\")   Wt (!) 197 kg (434 lb 9.6 oz)   SpO2 97%   BMI 60.61 kg/m²     Physical Exam  Vitals and nursing note reviewed.   Constitutional:       General: He is not in acute distress.     Appearance: He is well-developed.   HENT:     "  Head: Normocephalic and atraumatic.      Mouth/Throat:      Mouth: Mucous membranes are moist.   Eyes:      Conjunctiva/sclera: Conjunctivae normal.   Cardiovascular:      Rate and Rhythm: Normal rate and regular rhythm.      Pulses: Normal pulses.      Heart sounds: Normal heart sounds.   Pulmonary:      Effort: Pulmonary effort is normal. Tachypnea present. No respiratory distress.      Breath sounds: Wheezing present.      Comments: Limited exam due to increase body habitus    Abdominal:      General: Bowel sounds are normal.      Palpations: Abdomen is soft.   Skin:     General: Skin is warm and dry.      Capillary Refill: Capillary refill takes less than 2 seconds.   Neurological:      Mental Status: He is alert and oriented to person, place, and time.   Psychiatric:         Mood and Affect: Mood normal.         Behavior: Behavior normal.         Thought Content: Thought content normal.         ECG 12 Lead      Date/Time: 6/8/2021 4:08 PM  Performed by: Jamie Banks PA-C  Authorized by: Jamie Banks PA-C   Interpreted by physician  Comparison: compared with previous ECG from 4/2/2021  Similar to previous ECG  Rhythm: sinus tachycardia  Rate: tachycardic  BPM: 116  ST Segments: ST segments normal  Clinical impression: abnormal ECG                ED Course  ED Course as of Jun 08 1923   Tue Jun 08, 2021   1853 Paged Hospitalist.    [MOIRA]      ED Course User Index  [MOIRA] Jamie Banks PA-C      Results for orders placed or performed during the hospital encounter of 06/08/21   COVID-19 and FLU A/B PCR - Swab, Nasopharynx    Specimen: Nasopharynx; Swab   Result Value Ref Range    COVID19 Not Detected Not Detected - Ref. Range    Influenza A PCR Not Detected Not Detected    Influenza B PCR Not Detected Not Detected   Troponin    Specimen: Blood   Result Value Ref Range    Troponin T <0.010 0.000 - 0.030 ng/mL   Troponin    Specimen: Blood   Result Value Ref Range    Troponin T <0.010 0.000 -  0.030 ng/mL   Comprehensive Metabolic Panel    Specimen: Blood   Result Value Ref Range    Glucose 432 (C) 65 - 99 mg/dL    BUN 12 6 - 20 mg/dL    Creatinine 0.82 0.76 - 1.27 mg/dL    Sodium 129 (L) 136 - 145 mmol/L    Potassium 4.5 3.5 - 5.2 mmol/L    Chloride 94 (L) 98 - 107 mmol/L    CO2 19.0 (L) 22.0 - 29.0 mmol/L    Calcium 9.2 8.6 - 10.5 mg/dL    Total Protein 7.1 6.0 - 8.5 g/dL    Albumin 3.30 (L) 3.50 - 5.20 g/dL    ALT (SGPT) 20 1 - 41 U/L    AST (SGOT) 24 1 - 40 U/L    Alkaline Phosphatase 87 39 - 117 U/L    Total Bilirubin 0.4 0.0 - 1.2 mg/dL    eGFR Non African Amer 103 >60 mL/min/1.73    Globulin 3.8 gm/dL    A/G Ratio 0.9 g/dL    BUN/Creatinine Ratio 14.6 7.0 - 25.0    Anion Gap 16.0 (H) 5.0 - 15.0 mmol/L   BNP    Specimen: Blood   Result Value Ref Range    proBNP 46.9 0.0 - 450.0 pg/mL   Protime-INR    Specimen: Blood   Result Value Ref Range    Protime 13.5 11.1 - 15.3 Seconds    INR 0.99 0.80 - 1.20   CBC Auto Differential    Specimen: Blood   Result Value Ref Range    WBC 7.51 3.40 - 10.80 10*3/mm3    RBC 5.16 4.14 - 5.80 10*6/mm3    Hemoglobin 14.5 13.0 - 17.7 g/dL    Hematocrit 41.9 37.5 - 51.0 %    MCV 81.2 79.0 - 97.0 fL    MCH 28.1 26.6 - 33.0 pg    MCHC 34.6 31.5 - 35.7 g/dL    RDW 13.9 12.3 - 15.4 %    RDW-SD 40.4 37.0 - 54.0 fl    MPV 9.2 6.0 - 12.0 fL    Platelets 178 140 - 450 10*3/mm3    Neutrophil % 66.5 42.7 - 76.0 %    Lymphocyte % 18.4 (L) 19.6 - 45.3 %    Monocyte % 10.1 5.0 - 12.0 %    Eosinophil % 3.6 0.3 - 6.2 %    Basophil % 0.3 0.0 - 1.5 %    Immature Grans % 1.1 (H) 0.0 - 0.5 %    Neutrophils, Absolute 5.00 1.70 - 7.00 10*3/mm3    Lymphocytes, Absolute 1.38 0.70 - 3.10 10*3/mm3    Monocytes, Absolute 0.76 0.10 - 0.90 10*3/mm3    Eosinophils, Absolute 0.27 0.00 - 0.40 10*3/mm3    Basophils, Absolute 0.02 0.00 - 0.20 10*3/mm3    Immature Grans, Absolute 0.08 (H) 0.00 - 0.05 10*3/mm3    nRBC 0.0 0.0 - 0.2 /100 WBC   Scan Slide    Specimen: Blood   Result Value Ref Range    RBC  Morphology Normal Normal    WBC Morphology Normal Normal    Platelet Morphology Normal Normal   Acetone    Specimen: Blood   Result Value Ref Range    Acetone Negative Negative   Magnesium    Specimen: Blood   Result Value Ref Range    Magnesium 1.5 (L) 1.6 - 2.6 mg/dL   Urinalysis With Microscopic If Indicated (No Culture) - Urine, Clean Catch    Specimen: Urine, Clean Catch   Result Value Ref Range    Color, UA Yellow Yellow, Straw, Dark Yellow, Suni    Appearance, UA Clear Clear    pH, UA 5.5 5.0 - 9.0    Specific Gravity, UA 1.039 (H) 1.003 - 1.030    Glucose, UA >=1000 mg/dL (3+) (A) Negative    Ketones, UA Negative Negative    Bilirubin, UA Negative Negative    Blood, UA Negative Negative    Protein,  mg/dL (2+) (A) Negative    Leuk Esterase, UA Negative Negative    Nitrite, UA Negative Negative    Urobilinogen, UA 1.0 E.U./dL 0.2 - 1.0 E.U./dL   Blood Gas, Venous -    Specimen: Venous Blood   Result Value Ref Range    Site Venous     pH, Venous 7.403 (H) 7.290 - 7.370 pH Units    pCO2, Venous 45.3 41.0 - 51.0 mm Hg    pO2, Venous 63.5 (H) 27.0 - 53.0 mm Hg    HCO3, Venous 28.2 (H) 18.0 - 23.0 mmol/L    Base Excess, Venous 2.8 (H) 0.0 - 2.0 mmol/L    Barometric Pressure for Blood Gas      Modality N/A    Urinalysis, Microscopic Only - Urine, Clean Catch    Specimen: Urine, Clean Catch   Result Value Ref Range    RBC, UA None Seen None Seen /HPF    WBC, UA 0-2 None Seen, 0-2, 3-5 /HPF    Bacteria, UA None Seen None Seen /HPF    Squamous Epithelial Cells, UA 0-2 None Seen, 0-2 /HPF    Hyaline Casts, UA None Seen None Seen /LPF    Methodology Manual Light Microscopy    Blood Gas, Venous With Co-Ox    Specimen: Venous Blood   Result Value Ref Range    Site OTHER     pH, Venous 7.403 (H) 7.290 - 7.370 pH Units    pCO2, Venous 45.3 41.0 - 51.0 mm Hg    pO2, Venous 63.5 (H) 27.0 - 53.0 mm Hg    HCO3, Venous 28.2 (H) 18.0 - 23.0 mmol/L    Base Excess, Venous 2.8 (H) 0.0 - 2.0 mmol/L    O2 Saturation, Venous  92.7 (H) 45.0 - 75.0 %    Hemoglobin, Blood Gas 14.7 14 - 18 g/dL    Oxyhemoglobin Venous 90.5 (H) 45.0 - 75.0 %    Methemoglobin Venous 0.9 0.0 - 3.0 %    Carboxyhemoglobin Venous 1.4 0.0 - 5.0 %    Sodium, Venous 134 (L) 136 - 146 mmol/L    Potassium, Venous 4.2 3.5 - 5.0 mmol/L    Barometric Pressure for Blood Gas 747 mmHg    Modality Room Air     Ventilator Mode NA     Note      Collected by RN    Light Blue Top   Result Value Ref Range    Extra Tube hold for add-on    Green Top (Gel)   Result Value Ref Range    Extra Tube Hold for add-ons.    Lavender Top   Result Value Ref Range    Extra Tube hold for add-on    Gold Top - SST   Result Value Ref Range    Extra Tube Hold for add-ons.    Lavender Top   Result Value Ref Range    Extra Tube hold for add-on    Green Top (Gel)   Result Value Ref Range    Extra Tube Hold for add-ons.      CT Angiogram Chest    Result Date: 2021  Narrative: PROCEDURE: CT ANGIOGRAM CHEST .      EXAM:  Computed Tomography with CTA       REGION:  Chest     INDICATION:   Chest pain, shortness of breath  - rule out pulmonary embolism     CORRELATIVE IMAGIN2021                        TECHNIQUE:           - PE / vascular protocol             - reconstructions:  axial, coronal, sagittal, obliques   - computer-generated 3D reconstructions (MIPS) were performed.            - contrast:  intravenous 74 mL of Isovue-370                     This exam was performed according to our departmental dose-optimization program, which includes automated exposure control, adjustment of the mA and/or kV according to patient size and/or use of iterative reconstruction technique. DLP is 1967.9           COMMENTS:                           - Pulmonary arterial system:     - Main pulmonary artery trunk:  negative     - Left, right main pulmonary arteries: negative     - Lobar arteries: negative     - Segmental arteries: Limited evaluation of segmental arteries due to contrast phase    - Systemic  vascularity (as visualized):      - Aorta:  grossly negative / normal caliber / no dissection      - roots of great vessels:  grossly negative / normal caliber     - SVC / IVC:  grossly negative / normal caliber     - Misc (limited visualization):     - pulmonary parenchyma:  New, patchy airspace opacification in the right lower lobe     - pleura:  negative     - mediastinal / glendy:  negative     - neck, inferior:  grossly wnl     - subdiaphragmatic structures: Status post cholecystectomy- osseous:  Old healed left-sided rib fractures are present, as previously     - misc:  Small hiatal hernia   .      Impression: 1.  No evidence of pulmonary embolism, with the segmental branches less than optimally evaluated due to contrast phase no large, central filling defect identified. 2.  No evidence of pathology associated with the visualized aorta.    3. New patchy airspace opacification in the right lower lobe, suspicious for pneumonia. Follow-up to complete radiographic resolution recommended Electronically signed by:  Gela Shaffer MD  6/8/2021 5:56 PM CDT Workstation: [id number]                                         [facility]    Final diagnoses:   Pneumonia of right lower lobe due to infectious organism   Type 2 diabetes mellitus with hyperglycemia, with long-term current use of insulin (CMS/[facility])   Non compliance w medication regimen       ED Disposition  ED Disposition     ED Disposition Condition Comment    Decision to Admit  Level of Care: Telemetry [5]   Diagnosis: Pneumonia of right lower lobe due to infectious organism [[id number]]   Admitting Physician: SANDRA KEENAN [[id number]]   Attending Physician: SANDRA KEENAN [[id number]]            No follow-up provider specified.       Medication List      No changes were made to your prescriptions during this visit.          Jamie Banks PA-C  06/08/21 1922       Jamie Banks PA-C  06/08/21 1923      Electronically signed by Simon Roldan MD at 06/08/21  2219     Lala Garcia RN at 06/08/21 2055        Ice chips and water provided to pt      Lala Garcia RN  06/08/21 2056      Electronically signed by Lala Garcia RN at 06/08/21 2055     Lala Garcia RN at 06/08/21 2130        Pharmacy notified of boarder status and released admission orders     Lala Garcia RN  06/08/21 2130      Electronically signed by Lala Garcia RN at 06/08/21 2130     Rodney Couch RN at 06/09/21 0035        Report given to Rodney Saucedo RN, RN  06/09/21 0124      Electronically signed by Rodney Couch RN at 06/09/21 0124          Physician Progress Notes (last 72 hours) (Notes from 06/07/21 1429 through 06/10/21 1429)      Rehan Griffiths MD at 06/10/21 1105          Progress Note  Rehan Griffiths MD  Hospitalist    Date of visit: 6/10/2021     LOS: 0 days   Patient Care Team:  Mami Perez APRN as PCP - General (Family Medicine)    Chief Complaint: Cough and shortness of breath    Subjective     Interval History:     Patient Complaints: Cough and shortness of breath, somewhat better    History taken from: Patient    Medication Review:   Current Facility-Administered Medications   Medication Dose Route Frequency Provider Last Rate Last Admin   • acetaminophen (TYLENOL) tablet 650 mg  650 mg Oral Q4H PRN Judy Crews MD   650 mg at 06/10/21 0637   • albuterol (PROVENTIL) nebulizer solution 0.083% 2.5 mg/3mL  2.5 mg Nebulization Q6H PRN Simon Roldan MD       • amLODIPine (NORVASC) tablet 5 mg  5 mg Oral Q24H Yordy Goncalves MD   5 mg at 06/10/21 0836   • atorvastatin (LIPITOR) tablet 80 mg  80 mg Oral Nightly Yordy Goncalves MD   80 mg at 06/09/21 2148   • cefTRIAXone (ROCEPHIN) 1 g/100 mL 0.9% NS (MBP)  1 g Intravenous Q24H Yordy Goncalves MD   Stopped at 06/09/21 2230    And   • AZITHROMYCIN 500 MG/250 ML 0.9% NS IVPB (vial-mate)  500 mg Intravenous Q24H Yordy Goncalves MD   Stopped at 06/10/21 0030   • dextrose  (D50W) 25 g/ 50mL Intravenous Solution 25 g  25 g Intravenous Q15 Min PRN Yordy Goncalves MD       • dextrose (GLUTOSE) oral gel 15 g  15 g Oral Q15 Min PRN Yordy Goncalves MD       • dilTIAZem CD (CARDIZEM CD) 24 hr capsule 120 mg  120 mg Oral Daily Yordy Goncalves MD   120 mg at 06/10/21 0837   • gabapentin (NEURONTIN) capsule 600 mg  600 mg Oral TID Yordy Goncalves MD   600 mg at 06/10/21 0837   • glucagon (human recombinant) (GLUCAGEN DIAGNOSTIC) injection 1 mg  1 mg Subcutaneous Q15 Min PRN Yordy Goncalves MD       • insulin aspart (novoLOG) injection 0-24 Units  0-24 Units Subcutaneous TID AC Yordy Goncalves MD   4 Units at 06/10/21 0836   • insulin aspart (novoLOG) injection 20 Units  20 Units Subcutaneous TID With Meals Yordy Goncalves MD   20 Units at 06/10/21 0837   • insulin detemir (LEVEMIR) injection 65 Units  65 Units Subcutaneous Nightly Susy White MD   65 Units at 06/09/21 2148   • ipratropium-albuterol (DUO-NEB) nebulizer solution 3 mL  3 mL Nebulization Q4H PRN Judy Crews MD   3 mL at 06/09/21 1903   • ipratropium-albuterol (DUO-NEB) nebulizer solution 3 mL  3 mL Nebulization TID - RT Simon Roldan MD   3 mL at 06/10/21 0639   • isosorbide mononitrate (IMDUR) 24 hr tablet 120 mg  120 mg Oral QAM Yordy Goncalves MD   120 mg at 06/10/21 0617   • lisinopril (PRINIVIL,ZESTRIL) tablet 40 mg  40 mg Oral Nightly Yordy Goncalves MD   40 mg at 06/09/21 2148   • methocarbamol (ROBAXIN) tablet 500 mg  500 mg Oral 4x Daily Yordy Goncalves MD   500 mg at 06/10/21 0837   • metoprolol succinate XL (TOPROL-XL) 24 hr tablet 50 mg  50 mg Oral Daily Yordy Goncalves MD   50 mg at 06/10/21 0837   • nitroglycerin (NITROSTAT) SL tablet 0.4 mg  0.4 mg Sublingual Q5 Min PRN Yordy Goncalves MD       • pantoprazole (PROTONIX) EC tablet 40 mg  40 mg Oral Nightly Yordy Goncalves MD   40 mg at 06/09/21 3004   • Pharmacy to dose warfarin   Does not apply Continuous PRN  Yordy Goncalves MD       • pramipexole (MIRAPEX) tablet 2 mg  2 mg Oral Nightly Yordy Goncalves MD   2 mg at 06/09/21 2147   • prasugrel (EFFIENT) tablet 10 mg  10 mg Oral Daily Yordy Goncalves MD   10 mg at 06/10/21 0837   • ranolazine (RANEXA) 12 hr tablet 1,000 mg  1,000 mg Oral Q12H Yordy Goncalves MD   1,000 mg at 06/10/21 0836   • sodium chloride 0.9 % flush 10 mL  10 mL Intravenous PRN Yordy Goncalves MD   10 mL at 06/08/21 1625   • sodium chloride 0.9 % flush 10 mL  10 mL Intravenous Q12H Yordy Goncalves MD   10 mL at 06/09/21 2146   • sodium chloride 0.9 % flush 10 mL  10 mL Intravenous PRN Yordy Goncalves MD       • sodium chloride 0.9 % infusion  75 mL/hr Intravenous Continuous Susy White MD 75 mL/hr at 06/09/21 2148 75 mL/hr at 06/09/21 2148   • traZODone (DESYREL) tablet 300 mg  300 mg Oral Nightly Yordy Goncalves MD   300 mg at 06/09/21 2147       Review of Systems:   Review of Systems   Constitutional: Positive for fatigue. Negative for fever.   Respiratory: Positive for cough, shortness of breath and wheezing.    Cardiovascular: Negative for chest pain, palpitations and leg swelling.   Gastrointestinal: Negative for abdominal distention, abdominal pain, blood in stool, diarrhea and vomiting.   Genitourinary: Negative for dysuria, enuresis, frequency, hematuria, scrotal swelling and urgency.   Musculoskeletal: Positive for arthralgias.   Skin: Positive for pallor. Negative for color change and rash.   Neurological: Positive for weakness. Negative for seizures and syncope.   Psychiatric/Behavioral: Negative for agitation, behavioral problems and confusion.   All other systems reviewed and are negative.      Objective     Vital Signs  Temp:  [97.1 °F (36.2 °C)-98.3 °F (36.8 °C)] 97.4 °F (36.3 °C)  Heart Rate:  [62-82] 67  Resp:  [18-24] 24  BP: (113-144)/(54-66) 121/63    Physical Exam:  Physical Exam  Vitals reviewed.   Constitutional:       Appearance: He is obese. He is  ill-appearing.   HENT:      Head: Normocephalic and atraumatic.   Eyes:      General: No scleral icterus.     Extraocular Movements: Extraocular movements intact.      Pupils: Pupils are equal, round, and reactive to light.   Cardiovascular:      Rate and Rhythm: Normal rate and regular rhythm.   Pulmonary:      Effort: No respiratory distress.      Breath sounds: Normal breath sounds. No stridor. No wheezing, rhonchi or rales.   Abdominal:      General: Bowel sounds are normal. There is no distension.      Palpations: Abdomen is soft. There is no mass.      Tenderness: There is no abdominal tenderness. There is no right CVA tenderness or left CVA tenderness.   Musculoskeletal:         General: No deformity. Normal range of motion.      Cervical back: Normal range of motion and neck supple.      Right lower leg: Edema present.      Left lower leg: Edema present.   Skin:     General: Skin is dry.      Coloration: Skin is not jaundiced or pale.      Findings: No bruising or lesion.   Neurological:      General: No focal deficit present.      Mental Status: He is alert and oriented to person, place, and time.      Cranial Nerves: No cranial nerve deficit.      Motor: No weakness.      Gait: Gait normal.   Psychiatric:         Mood and Affect: Mood normal.         Behavior: Behavior normal.          Results Review:    Lab Results (last 24 hours)     Procedure Component Value Units Date/Time    Blood Culture - Blood, Blood, PICC Line [[id number]]  (Abnormal) Collected: 06/09/21 1224    Specimen: Blood, PICC Line Updated: 06/10/21 1106     Blood Culture Abnormal Stain     Gram Stain Aerobic Bottle Gram positive cocci in clusters     Comment: 1 positive of 4 drawn for gram positive cocci        Blood Culture ID, PCR - Blood, Blood, PICC Line [[id number]]  (Abnormal) Collected: 06/09/21 1224    Specimen: Blood, PICC Line Updated: 06/10/21 1106     BCID, PCR Staphylococcus species, not aureus. mecA (methicillin resistance  gene) detected. Identification by BCID PCR.    POC Glucose Once [[id number]]  (Abnormal) Collected: 06/10/21 1004    Specimen: Blood Updated: 06/10/21 1033     Glucose 247 mg/dL      Comment: RN NotifiedOperator: [id number] QUYNH Museer ID: [id number]       Respiratory Culture - Sputum, Alveoli [[id number]] Collected: 06/09/21 0717    Specimen: Sputum from Alveoli Updated: 06/10/21 1000     Respiratory Culture Scant growth (1+) Normal Respiratory Radha: NO S.aureus/MRSA or Pseudomonas aeruginosa     Gram Stain Many (4+) WBCs seen      No epithelial cells seen      Mixed bacterial radha    Blood Culture - Blood, Arm, Left [[id number]]  (Abnormal) Collected: 06/08/21 2106    Specimen: Blood from Arm, Left Updated: 06/10/21 0905     Blood Culture Staphylococcus, coagulase negative     Comment: Probable contaminant requires clinical correlation, susceptibility not performed unless requested by physician.          Isolated from Aerobic Bottle     Gram Stain Aerobic Bottle Gram positive cocci in clusters     Comment: 1 BOTTLE POSITIVE OF 4 COLLECTED        Protime-INR [[id number]]  (Normal) Collected: 06/10/21 0727    Specimen: Blood Updated: 06/10/21 0903     Protime 14.1 Seconds      INR 1.05    Narrative:      Therapeutic range for most indications is 2.0-3.0 INR,  or 2.5-3.5 for mechanical heart valves.    Basic Metabolic Panel [[id number]]  (Abnormal) Collected: 06/10/21 0727    Specimen: Blood Updated: 06/10/21 0902     Glucose 184 mg/dL      BUN 23 mg/dL      Creatinine 1.22 mg/dL      Sodium 129 mmol/L      Potassium 3.9 mmol/L      Chloride 98 mmol/L      CO2 26.0 mmol/L      Calcium 8.8 mg/dL      eGFR Non African Amer 65 mL/min/1.73      BUN/Creatinine Ratio 18.9     Anion Gap 5.0 mmol/L     Narrative:      GFR Normal >60  Chronic Kidney Disease <60  Kidney Failure <15      CBC & Differential [[id number]]  (Abnormal) Collected: 06/10/21 0727    Specimen: Blood Updated: 06/10/21 0833    Narrative:      The  following orders were created for panel order CBC & Differential.  Procedure                               Abnormality         Status                     ---------                               -----------         ------                     CBC Auto Differential[[id number]]        Abnormal            Final result                 Please view results for these tests on the individual orders.    CBC Auto Differential [[id number]]  (Abnormal) Collected: 06/10/21 0727    Specimen: Blood Updated: 06/10/21 0833     WBC 6.54 10*3/mm3      RBC 4.32 10*6/mm3      Hemoglobin 11.9 g/dL      Hematocrit 35.6 %      MCV 82.4 fL      MCH 27.5 pg      MCHC 33.4 g/dL      RDW 14.6 %      RDW-SD 43.1 fl      MPV 9.3 fL      Platelets 163 10*3/mm3      Neutrophil % 51.0 %      Lymphocyte % 29.7 %      Monocyte % 13.0 %      Eosinophil % 4.4 %      Basophil % 0.5 %      Immature Grans % 1.4 %      Neutrophils, Absolute 3.34 10*3/mm3      Lymphocytes, Absolute 1.94 10*3/mm3      Monocytes, Absolute 0.85 10*3/mm3      Eosinophils, Absolute 0.29 10*3/mm3      Basophils, Absolute 0.03 10*3/mm3      Immature Grans, Absolute 0.09 10*3/mm3      nRBC 0.0 /100 WBC     POC Glucose Once [[id number]]  (Abnormal) Collected: 06/10/21 0625    Specimen: Blood Updated: 06/10/21 0646     Glucose 166 mg/dL      Comment: RN NotifiedOperator: [id number] WILMER CastlewoodMeter ID: [id number]       Respiratory Panel, PCR (WITHOUT COVID) - Swab, Nasopharynx [[id number]]  (Abnormal) Collected: 06/09/21 2200    Specimen: Swab from Nasopharynx Updated: 06/09/21 2310     ADENOVIRUS, PCR Not Detected     Coronavirus 229E Not Detected     Coronavirus HKU1 Not Detected     Coronavirus NL63 Not Detected     Coronavirus OC43 Detected     Human Metapneumovirus Not Detected     Human Rhinovirus/Enterovirus Not Detected     Influenza B PCR Not Detected     Parainfluenza Virus 1 Not Detected     Parainfluenza Virus 2 Not Detected     Parainfluenza Virus 3 Detected      Parainfluenza Virus 4 Not Detected     Bordetella pertussis pcr Not Detected     Chlamydophila pneumoniae PCR Not Detected     Mycoplasma pneumo by PCR Not Detected     Influenza A PCR Not Detected     RSV, PCR Not Detected     Bordetella parapertussis PCR Not Detected    Narrative:      The coronavirus on the RVP is NOT COVID-19 and is NOT indicative of infection with COVID-19.    In the setting of a positive respiratory panel with a viral infection PLUS a negative procalcitonin without other underlying concern for bacterial infection, consider observing off antibiotics or discontinuation of antibiotics and continue supportive care. If the respiratory panel is positive for atypical bacterial infection (Bordetella pertussis, Chlamydophila pneumoniae, or Mycoplasma pneumoniae), consider antibiotic de-escalation to target atypical bacterial infection.    Blood Culture - Blood, Arm, Left [[id number]] Collected: 06/08/21 2145    Specimen: Blood from Arm, Left Updated: 06/09/21 2200     Blood Culture No growth at 24 hours    Legionella Antigen, Urine - Urine, Urine, Clean Catch [[id number]]  (Normal) Collected: 06/09/21 1056    Specimen: Urine, Clean Catch Updated: 06/09/21 2137     LEGIONELLA ANTIGEN, URINE Negative    POC Glucose Once [[id number]]  (Normal) Collected: 06/09/21 2022    Specimen: Blood Updated: 06/09/21 2133     Glucose 129 mg/dL      Comment: RN NotifiedOperator: [id number] WILMER SARAHMeter ID: [id number]       Blood Culture ID, PCR - Blood, Arm, Left [[id number]]  (Abnormal) Collected: 06/08/21 2106    Specimen: Blood from Arm, Left Updated: 06/09/21 1754     BCID, PCR Staphylococcus spp, not aureus. Identification by BCID PCR.    Narrative:      Sensitivity to Follow          Imaging Results (Last 24 Hours)     ** No results found for the last 24 hours. **          Assessment/Plan       Right lower lobe pneumonia    Uncontrolled hypertension    ADOLFO on CPAP    Chronic pulmonary embolism (CMS/HCC)     "Morbid obesity (CMS/HCC)    Diabetes mellitus (CMS/HCC)    Hyponatremia    Continue with the supplemental oxygen if necessary, nebulized treatments, IV antibiotics. The blood cultures identified Staph species (not aureus) in 1/4 bottles (possible contaminant).    Continue with the Coumadin for the chronic pulmonary embolism, the CPAP at night.    His blood pressure values are much better controlled.    I confirmed that the patient's Advance Care Plan is present, code status is documented, or surrogate decision maker is listed in the patient's medical record.      Rehan Griffiths MD  06/10/21  12:25 CDT        Electronically signed by Rehan Griffiths MD at 06/10/21 1225     Susy White MD at 06/09/21 1103              [facility]  INPATIENT PROGRESS NOTE    Length of Stay: 0  Date of Admission: 6/8/2021  Primary Care Physician: Mami Perez APRN    Subjective   Chief Complaint: \"My breathing is awful\"   HPI:  No new problems this morning. Says he continues with shortness of breath, no improvement since admission. Has intermittent cough, largely nonproductive. He describes his breathing as \"tight\" but denies chest pain. No nausea, vomiting, diarrhea. Notes dark urine and decreased urine output overnight.   Does not have previous supplemental o2 requirement at home. Currently requiring 2lt 02 NC.  Review of Systems     All pertinent negatives and positives are as above. All other systems have been reviewed and are negative unless otherwise stated.     Objective    Temp:  [97.4 °F (36.3 °C)-98.3 °F (36.8 °C)] 98.3 °F (36.8 °C)  Heart Rate:  [] 76  Resp:  [22-26] 22  BP: ()/(49-96) 110/54    Physical Exam  Vitals reviewed.   Constitutional:       Appearance: He is obese.   HENT:      Head: Normocephalic.      Mouth/Throat:      Mouth: Mucous membranes are moist.   Cardiovascular:      Rate and Rhythm: Normal rate and regular rhythm.      " Heart sounds: Normal heart sounds.   Pulmonary:      Effort: Pulmonary effort is normal.      Breath sounds: Wheezing present.      Comments: Diminished in bilateral bases with faint expiratory wheeze. No rhonchi appreciated.   Abdominal:      General: Bowel sounds are normal.      Palpations: Abdomen is soft.   Skin:     General: Skin is warm and dry.   Neurological:      General: No focal deficit present.      Mental Status: He is alert and oriented to person, place, and time.   Psychiatric:         Mood and Affect: Mood normal.         Behavior: Behavior normal.             Results Review:  I have reviewed the labs, radiology results, and diagnostic studies.    Laboratory Data:   Results from last 7 days   Lab Units 06/09/21  0559 06/08/21  1800 06/08/21  1620   SODIUM mmol/L 134*  --  129*   SODIUM, VENOUS mmol/L  --  134*  --    POTASSIUM mmol/L 4.4  --  4.5   POTASSIUM, VENOUS mmol/L  --  4.2  --    CHLORIDE mmol/L 97*  --  94*   CO2 mmol/L 29.0  --  19.0*   BUN mg/dL 15  --  12   CREATININE mg/dL 1.69*  --  0.82   GLUCOSE mg/dL 301*  --  432*   CALCIUM mg/dL 8.9  --  9.2   BILIRUBIN mg/dL  --   --  0.4   ALK PHOS U/L  --   --  87   ALT (SGPT) U/L  --   --  20   AST (SGOT) U/L  --   --  24   ANION GAP mmol/L 8.0  --  16.0*     Estimated Creatinine Clearance: 99.9 mL/min (A) (by C-G formula based on SCr of 1.69 mg/dL (H)).  Results from last 7 days   Lab Units 06/08/21  1633   MAGNESIUM mg/dL 1.5*         Results from last 7 days   Lab Units 06/09/21  0559 06/08/21  1620   WBC 10*3/mm3 7.84 7.51   HEMOGLOBIN g/dL 13.5 14.5   HEMATOCRIT % 40.5 41.9   PLATELETS 10*3/mm3 164 178     Results from last 7 days   Lab Units 06/09/21  0559 06/08/21  2145 06/08/21  1633   INR  0.97 0.95 0.99       Culture Data:   No results found for: BLOODCX  No results found for: URINECX  No results found for: RESPCX  No results found for: WOUNDCX  No results found for: STOOLCX  No components found for: BODYFLD    Radiology Data:      Imaging Results (Last 24 Hours)     Procedure Component Value Units Date/Time    CT Angiogram Chest [[id number]] Collected: 21     Updated: 21    Narrative:      PROCEDURE: CT ANGIOGRAM CHEST      .        EXAM:  Computed Tomography with CTA         REGION:  Chest         INDICATION:   Chest pain, shortness of breath  - rule out  pulmonary embolism         CORRELATIVE IMAGIN2021                          TECHNIQUE:             - PE / vascular protocol               - reconstructions:  axial, coronal, sagittal, obliques     - computer-generated 3D reconstructions (MIPS) were performed.                - contrast:  intravenous 74 mL of Isovue-370                         This exam was performed according to our departmental  dose-optimization program, which includes automated exposure  control, adjustment of the mA and/or kV according to patient size  and/or use of iterative reconstruction technique.  DLP is 1967.9              COMMENTS:                               - Pulmonary arterial system:      - Main pulmonary artery trunk:  negative      - Left, right main pulmonary arteries: negative      - Lobar arteries: negative      - Segmental arteries: Limited evaluation of segmental  arteries due to contrast phase       - Systemic vascularity (as visualized):        - Aorta:  grossly negative / normal caliber / no dissection        - roots of great vessels:  grossly negative / normal caliber         - SVC / IVC:  grossly negative / normal caliber         - Misc (limited visualization):      - pulmonary parenchyma:  New, patchy airspace opacification  in the right lower lobe      - pleura:  negative      - mediastinal / glendy:  negative      - neck, inferior:  grossly wnl      - subdiaphragmatic structures: Status post cholecystectomy-  osseous:  Old healed left-sided rib fractures are present, as  previously      - misc:  Small hiatal hernia      .      Impression:      1.  No evidence of  pulmonary embolism, with the segmental  branches less than optimally evaluated due to contrast phase no  large, central filling defect identified.  2.  No evidence of pathology associated with the visualized  aorta.      3. New patchy airspace opacification in the right lower lobe,  suspicious for pneumonia. Follow-up to complete radiographic  resolution recommended    Electronically signed by:  Gela Shaffer MD  6/8/2021 5:56 PM CDT  Workstation: [id number]          I have reviewed the patient's current medications.     Assessment/Plan         Hyperlipidemia    ADOLFO on CPAP    RLS (restless legs syndrome)    Chronic pulmonary embolism (CMS/HCC)-CTA chest without obvious recurrent PE despite noncompliance with warfarin therapy. Warfarin resumed. Trend INR daily.     Class 3 severe obesity due to excess calories with serious comorbidity and body mass index (BMI) of 60.0 to 69.9 in adult (CMS/HCC)    Atherosclerotic heart disease of native coronary artery with other forms of angina pectoris (CMS/HCC)    Paroxysmal atrial fibrillation (CMS/HCC)-Rate controlled with diltiazem and metoprolol. INR subtherapeutic on warfarin therapy due to noncompliance. Warfarin resumed, trend INR daily.     Hypertension-Controlled with metoprolol, lisinopril, isosorbide and amlodipine.     Type 2 diabetes mellitus with hyperglycemia, with long-term current use of insulin (CMS/HCC)-Appears uncontrolled. Check current A1c. Will increase basal insulin dosing. Continue SSI/accuchecks.     Acute hypoxemic respiratory failure-secondary to CA-PNA. Patient has previously refused COVID vaccination. Testing is negative on admission. Continue current empiric coverage with rocephin and azithromycin. Follow blood and sputum culture results. Continue o2 support and bronchodliator therapy.     Pneumonia of right lower lobe due to infectious organism-as above.     Renal Insufficiency-SARAH with UA showing concentrated urine and proteinuria. Start IVF now.  Last echocardiogram 4/2/21 with preserved EF and diastolic function. Trend labs.         Discharge Planning: I expect patient to be discharged to home in 1-2 days.     Susy White MD      I confirmed that the patient's Advance Care Plan is present, code status is documented, or surrogate decision maker is listed in the patient's medical record.     Electronically signed by Susy White MD at 06/09/21 1115       Consult Notes (last 72 hours) (Notes from 06/07/21 1429 through 06/10/21 1429)    No notes of this type exist for this encounter.

## 2021-06-10 NOTE — PROGRESS NOTES
Item 0 Points 1 Point 2 Points 3 Points 4 Points Subtotal   Mental Status Alert, oriented, cooperative Lethargic, follows commands Confused, not following commands Obtunded or Somnolent Comatose 0   Respiratory Pattern Regular RR 8-16 breaths/minute Increased RR 18-25 breaths/minute Dyspnea on exertion, irregular RR 26-30 breaths/minute Shortness of breath,  RR 31-35 breaths/minute Accessory muscle use, severe SOB  RR > 35 breaths/minute 1   Breath Sounds Clear Decreased unilaterally Decreased bilaterally Basilar crackles Wheezing and/or rhonchi 4   Cough Strong, spontaneous, non-productive Strong productive Weak, non-productive Weak, productive or weak with rhonchi Absent or may require suctioning 1   Pulmonary Status Nonsmoker, no previous history, >1 year quit < 1 PPD  <1 year quit > or = 1 PPD Diagnosed pulmonary disease (severe or chronic) Severe or chronic pulmonary disease with exacerbation 0   Surgical Status None General surgery (non-abdominal or non-thoracic) Lower abdominal Thoracic or upper abdominal Thoracic with pulmonary disease 0   Chest X-ray Clear Chronic changes Infiltrates, atelectasis or pleural effusion Infiltrates in > 1 lobe Diffuse infiltrates and atelectasis and/or effusions NA.   Activity   Level Ambulatory Ambulatory with assistance Non-ambulatory Paraplegic Quadriplegic 0        Total Score   6   Score    Drug Therapy Frequency 20 or >    Q4 Duoneb with Q2 Albuterol PRN 15-19    Q6 Duoneb with Q4 Albuterol PRN 10-14    QID Duoneb with Q4 Albuterol PRN 5-9    TID Duoneb with Q6 Albuterol PRN 0-4    Q4 PRN Duoneb                  Lung Expansion Therapy (PEP) Bronchopulmonary Hygiene (CPT)   Q4 & PRN - Severe atelectasis, poor oxygenation Q4 - Copious secretions, dyspnea, unable to sleep   QID - High risk for persistent atelectasis, existence of atelectasis QID & Q4 PRN - Moderate secretion production   TID - At risk for developing atelectasis TID - Small amounts of secretions with poor  cough   BID - Prevention of atelectasis BID - Unable to breathe deeply and cough spontaneously     RT Comments / Recommendations:    Per RT, patient to continue TID Duonebs with Q6 albuterol PRN. Re-eval in 48 hours.

## 2021-06-10 NOTE — PLAN OF CARE
Problem: Adult Inpatient Plan of Care  Goal: Plan of Care Review  Outcome: Ongoing, Progressing  Flowsheets  Taken 6/10/2021 0034 by Betsy Rubin RN  Progress: no change  Outcome Summary: pt alert and oriented, oxygen not at baseline, vs stable, no s/s of distress at this time.  Taken 6/9/2021 1151 by Concha Whittington RN  Plan of Care Reviewed With: patient     Problem: Adult Inpatient Plan of Care  Goal: Patient-Specific Goal (Individualized)  Outcome: Ongoing, Progressing     Problem: Adult Inpatient Plan of Care  Goal: Absence of Hospital-Acquired Illness or Injury  Outcome: Ongoing, Progressing     Problem: Adult Inpatient Plan of Care  Goal: Absence of Hospital-Acquired Illness or Injury  Intervention: Identify and Manage Fall Risk  Recent Flowsheet Documentation  Taken 6/10/2021 0031 by Betsy Rubin RN  Safety Promotion/Fall Prevention:   safety round/check completed   nonskid shoes/slippers when out of bed     Problem: Adult Inpatient Plan of Care  Goal: Absence of Hospital-Acquired Illness or Injury  Intervention: Prevent Skin Injury  Recent Flowsheet Documentation  Taken 6/10/2021 0031 by Betsy Rubin RN  Body Position: position changed independently     Problem: Adult Inpatient Plan of Care  Goal: Optimal Comfort and Wellbeing  Outcome: Ongoing, Progressing     Problem: Adult Inpatient Plan of Care  Goal: Readiness for Transition of Care  Outcome: Ongoing, Progressing     Problem: Fluid Imbalance (Pneumonia)  Goal: Fluid Balance  Outcome: Ongoing, Progressing     Problem: Infection (Pneumonia)  Goal: Resolution of Infection Signs and Symptoms  Outcome: Ongoing, Progressing     Problem: Infection (Pneumonia)  Goal: Resolution of Infection Signs and Symptoms  Outcome: Ongoing, Progressing     Problem: Respiratory Compromise (Pneumonia)  Goal: Effective Oxygenation and Ventilation  Outcome: Ongoing, Progressing     Problem: Diabetes Comorbidity  Goal: Blood Glucose Level Within  Desired Range  Outcome: Ongoing, Progressing     Problem: Obstructive Sleep Apnea Risk or Actual (Comorbidity Management)  Goal: Unobstructed Breathing During Sleep  Outcome: Ongoing, Progressing     Problem: Bariatric Environmental Safety  Goal: Safety Maintained with Care  Outcome: Ongoing, Progressing   Goal Outcome Evaluation:           Progress: no change  Outcome Summary: pt alert and oriented, oxygen not at baseline, vs stable, no s/s of distress at this time.

## 2021-06-10 NOTE — PROGRESS NOTES
"Anticoagulation by Pharmacy - Warfarin    Yordy Hansen is a 40 y.o.male who has been consulted for warfarin for atrial fibrillation/hx of PE.     Home regimen: Warfarin 7.5 mg or as directed by [facility]. Patient was not compliant with his regimen.   INR Goal: 2-3    Objective:  [Ht: 180.3 cm (71\"); Wt: (!) 199 kg (438 lb)]    Lab Results   Component Value Date    INR 1.05 06/10/2021    INR 0.97 06/09/2021    INR 0.95 06/08/2021    PROTIME 14.1 06/10/2021    PROTIME 13.3 06/09/2021    PROTIME 13.1 06/08/2021     Lab Results   Component Value Date    HGB 11.9 (L) 06/10/2021    HGB 13.5 06/09/2021    HGB 14.5 06/08/2021    HCT 35.6 (L) 06/10/2021    HCT 40.5 06/09/2021    HCT 41.9 06/08/2021     06/10/2021     06/09/2021     06/08/2021       Recent Warfarin Administrations     The 5 most recent administrations since 06/02/2021 are shown below each listed medication.    Warfarin Sodium       Order Dose Date Given     warfarin (COUMADIN) tablet 7.5 mg 7.5 mg 06/08/2021                Assessment  • H/H/plts WNL  • Interacting medications: azithromycin, ceftriaxone, prasugrel  • INR is at baseline. Patient given 7.5 mg on 6/8, increased to 10 mg yesterday. Will give another 10 mg tonight.      Plan:  1. Give warfarin 10 mg tablet PO @ 1800 tonight  2. PT/INR ordered daily  3. Pharmacy will continue to follow    Mikey Vincent [facility]  06/10/21 12:44 CDT     "

## 2021-06-11 LAB
ANION GAP SERPL CALCULATED.3IONS-SCNC: 8 MMOL/L (ref 5–15)
BACTERIA SPEC AEROBE CULT: ABNORMAL
BACTERIA SPEC AEROBE CULT: ABNORMAL
BACTERIA SPEC RESP CULT: NORMAL
BASOPHILS # BLD AUTO: 0.03 10*3/MM3 (ref 0–0.2)
BASOPHILS NFR BLD AUTO: 0.5 % (ref 0–1.5)
BUN SERPL-MCNC: 10 MG/DL (ref 6–20)
BUN/CREAT SERPL: 13.2 (ref 7–25)
CALCIUM SPEC-SCNC: 9 MG/DL (ref 8.6–10.5)
CHLORIDE SERPL-SCNC: 103 MMOL/L (ref 98–107)
CO2 SERPL-SCNC: 26 MMOL/L (ref 22–29)
CREAT SERPL-MCNC: 0.76 MG/DL (ref 0.76–1.27)
DEPRECATED RDW RBC AUTO: 42.3 FL (ref 37–54)
EOSINOPHIL # BLD AUTO: 0.36 10*3/MM3 (ref 0–0.4)
EOSINOPHIL NFR BLD AUTO: 5.8 % (ref 0.3–6.2)
ERYTHROCYTE [DISTWIDTH] IN BLOOD BY AUTOMATED COUNT: 14.3 % (ref 12.3–15.4)
GFR SERPL CREATININE-BSD FRML MDRD: 112 ML/MIN/1.73
GLUCOSE BLDC GLUCOMTR-MCNC: 147 MG/DL (ref 70–130)
GLUCOSE BLDC GLUCOMTR-MCNC: 217 MG/DL (ref 70–130)
GLUCOSE BLDC GLUCOMTR-MCNC: 225 MG/DL (ref 70–130)
GLUCOSE BLDC GLUCOMTR-MCNC: 263 MG/DL (ref 70–130)
GLUCOSE SERPL-MCNC: 162 MG/DL (ref 65–99)
GRAM STN SPEC: ABNORMAL
GRAM STN SPEC: ABNORMAL
GRAM STN SPEC: NORMAL
HCT VFR BLD AUTO: 38.2 % (ref 37.5–51)
HGB BLD-MCNC: 12.5 G/DL (ref 13–17.7)
IMM GRANULOCYTES # BLD AUTO: 0.09 10*3/MM3 (ref 0–0.05)
IMM GRANULOCYTES NFR BLD AUTO: 1.4 % (ref 0–0.5)
INR PPP: 1.15 (ref 0.8–1.2)
ISOLATED FROM: ABNORMAL
ISOLATED FROM: ABNORMAL
LYMPHOCYTES # BLD AUTO: 1.77 10*3/MM3 (ref 0.7–3.1)
LYMPHOCYTES NFR BLD AUTO: 28.4 % (ref 19.6–45.3)
MCH RBC QN AUTO: 27 PG (ref 26.6–33)
MCHC RBC AUTO-ENTMCNC: 32.7 G/DL (ref 31.5–35.7)
MCV RBC AUTO: 82.5 FL (ref 79–97)
MONOCYTES # BLD AUTO: 0.62 10*3/MM3 (ref 0.1–0.9)
MONOCYTES NFR BLD AUTO: 10 % (ref 5–12)
NEUTROPHILS NFR BLD AUTO: 3.36 10*3/MM3 (ref 1.7–7)
NEUTROPHILS NFR BLD AUTO: 53.9 % (ref 42.7–76)
NRBC BLD AUTO-RTO: 0 /100 WBC (ref 0–0.2)
PLATELET # BLD AUTO: 177 10*3/MM3 (ref 140–450)
PMV BLD AUTO: 8.9 FL (ref 6–12)
POTASSIUM SERPL-SCNC: 4 MMOL/L (ref 3.5–5.2)
PROCALCITONIN SERPL-MCNC: 0.05 NG/ML (ref 0–0.25)
PROTHROMBIN TIME: 15.2 SECONDS (ref 11.1–15.3)
RBC # BLD AUTO: 4.63 10*6/MM3 (ref 4.14–5.8)
SODIUM SERPL-SCNC: 137 MMOL/L (ref 136–145)
WBC # BLD AUTO: 6.23 10*3/MM3 (ref 3.4–10.8)

## 2021-06-11 PROCEDURE — 94799 UNLISTED PULMONARY SVC/PX: CPT

## 2021-06-11 PROCEDURE — 63710000001 INSULIN DETEMIR PER 5 UNITS: Performed by: FAMILY MEDICINE

## 2021-06-11 PROCEDURE — 84145 PROCALCITONIN (PCT): CPT | Performed by: FAMILY MEDICINE

## 2021-06-11 PROCEDURE — 94760 N-INVAS EAR/PLS OXIMETRY 1: CPT

## 2021-06-11 PROCEDURE — 85025 COMPLETE CBC W/AUTO DIFF WBC: CPT | Performed by: HOSPITALIST

## 2021-06-11 PROCEDURE — 80048 BASIC METABOLIC PNL TOTAL CA: CPT | Performed by: HOSPITALIST

## 2021-06-11 PROCEDURE — 25010000002 CEFTRIAXONE PER 250 MG: Performed by: HOSPITALIST

## 2021-06-11 PROCEDURE — 82962 GLUCOSE BLOOD TEST: CPT

## 2021-06-11 PROCEDURE — 25010000002 METHYLPREDNISOLONE PER 125 MG: Performed by: FAMILY MEDICINE

## 2021-06-11 PROCEDURE — 85610 PROTHROMBIN TIME: CPT | Performed by: INTERNAL MEDICINE

## 2021-06-11 PROCEDURE — 63710000001 INSULIN ASPART PER 5 UNITS: Performed by: HOSPITALIST

## 2021-06-11 RX ORDER — METHYLPREDNISOLONE SODIUM SUCCINATE 125 MG/2ML
60 INJECTION, POWDER, LYOPHILIZED, FOR SOLUTION INTRAMUSCULAR; INTRAVENOUS EVERY 8 HOURS
Status: DISCONTINUED | OUTPATIENT
Start: 2021-06-11 | End: 2021-06-12 | Stop reason: HOSPADM

## 2021-06-11 RX ORDER — WARFARIN SODIUM 10 MG/1
10 TABLET ORAL
Status: DISCONTINUED | OUTPATIENT
Start: 2021-06-11 | End: 2021-06-12 | Stop reason: HOSPADM

## 2021-06-11 RX ORDER — NYSTATIN 100000 [USP'U]/G
POWDER TOPICAL EVERY 12 HOURS SCHEDULED
Status: DISCONTINUED | OUTPATIENT
Start: 2021-06-11 | End: 2021-06-12 | Stop reason: HOSPADM

## 2021-06-11 RX ADMIN — INSULIN ASPART 8 UNITS: 100 INJECTION, SOLUTION INTRAVENOUS; SUBCUTANEOUS at 12:30

## 2021-06-11 RX ADMIN — DILTIAZEM HYDROCHLORIDE 120 MG: 120 CAPSULE, COATED, EXTENDED RELEASE ORAL at 08:59

## 2021-06-11 RX ADMIN — IPRATROPIUM BROMIDE AND ALBUTEROL SULFATE 3 ML: 2.5; .5 SOLUTION RESPIRATORY (INHALATION) at 03:19

## 2021-06-11 RX ADMIN — CEFTRIAXONE SODIUM 1 G: 1 INJECTION, POWDER, FOR SOLUTION INTRAMUSCULAR; INTRAVENOUS at 20:29

## 2021-06-11 RX ADMIN — NYSTATIN: 100000 POWDER TOPICAL at 21:40

## 2021-06-11 RX ADMIN — AMLODIPINE BESYLATE 5 MG: 5 TABLET ORAL at 08:59

## 2021-06-11 RX ADMIN — WARFARIN SODIUM 10 MG: 10 TABLET ORAL at 17:35

## 2021-06-11 RX ADMIN — RANOLAZINE 1000 MG: 500 TABLET, FILM COATED, EXTENDED RELEASE ORAL at 20:30

## 2021-06-11 RX ADMIN — METHOCARBAMOL 500 MG: 500 TABLET, FILM COATED ORAL at 17:35

## 2021-06-11 RX ADMIN — METHYLPREDNISOLONE SODIUM SUCCINATE 60 MG: 125 INJECTION, POWDER, FOR SOLUTION INTRAMUSCULAR; INTRAVENOUS at 12:30

## 2021-06-11 RX ADMIN — PRASUGREL 10 MG: 10 TABLET, FILM COATED ORAL at 08:59

## 2021-06-11 RX ADMIN — SODIUM CHLORIDE, PRESERVATIVE FREE 10 ML: 5 INJECTION INTRAVENOUS at 20:34

## 2021-06-11 RX ADMIN — METHOCARBAMOL 500 MG: 500 TABLET, FILM COATED ORAL at 20:30

## 2021-06-11 RX ADMIN — METHOCARBAMOL 500 MG: 500 TABLET, FILM COATED ORAL at 08:59

## 2021-06-11 RX ADMIN — GABAPENTIN 600 MG: 300 CAPSULE ORAL at 20:31

## 2021-06-11 RX ADMIN — GABAPENTIN 600 MG: 300 CAPSULE ORAL at 16:13

## 2021-06-11 RX ADMIN — IPRATROPIUM BROMIDE AND ALBUTEROL SULFATE 3 ML: 2.5; .5 SOLUTION RESPIRATORY (INHALATION) at 12:41

## 2021-06-11 RX ADMIN — INSULIN DETEMIR 65 UNITS: 100 INJECTION, SOLUTION SUBCUTANEOUS at 21:12

## 2021-06-11 RX ADMIN — METHOCARBAMOL 500 MG: 500 TABLET, FILM COATED ORAL at 12:30

## 2021-06-11 RX ADMIN — RANOLAZINE 1000 MG: 500 TABLET, FILM COATED, EXTENDED RELEASE ORAL at 08:59

## 2021-06-11 RX ADMIN — METOPROLOL SUCCINATE 50 MG: 50 TABLET, EXTENDED RELEASE ORAL at 08:59

## 2021-06-11 RX ADMIN — GABAPENTIN 600 MG: 300 CAPSULE ORAL at 08:59

## 2021-06-11 RX ADMIN — INSULIN ASPART 20 UNITS: 100 INJECTION, SOLUTION INTRAVENOUS; SUBCUTANEOUS at 12:30

## 2021-06-11 RX ADMIN — INSULIN ASPART 8 UNITS: 100 INJECTION, SOLUTION INTRAVENOUS; SUBCUTANEOUS at 17:35

## 2021-06-11 RX ADMIN — PRAMIPEXOLE DIHYDROCHLORIDE 2 MG: 1 TABLET ORAL at 20:30

## 2021-06-11 RX ADMIN — INSULIN ASPART 20 UNITS: 100 INJECTION, SOLUTION INTRAVENOUS; SUBCUTANEOUS at 17:35

## 2021-06-11 RX ADMIN — INSULIN ASPART 20 UNITS: 100 INJECTION, SOLUTION INTRAVENOUS; SUBCUTANEOUS at 09:00

## 2021-06-11 RX ADMIN — ISOSORBIDE MONONITRATE 120 MG: 60 TABLET, EXTENDED RELEASE ORAL at 06:07

## 2021-06-11 RX ADMIN — IPRATROPIUM BROMIDE AND ALBUTEROL SULFATE 3 ML: 2.5; .5 SOLUTION RESPIRATORY (INHALATION) at 06:36

## 2021-06-11 RX ADMIN — LISINOPRIL 40 MG: 40 TABLET ORAL at 20:31

## 2021-06-11 RX ADMIN — ATORVASTATIN CALCIUM 80 MG: 40 TABLET, FILM COATED ORAL at 20:30

## 2021-06-11 RX ADMIN — TRAZODONE HYDROCHLORIDE 300 MG: 150 TABLET ORAL at 20:31

## 2021-06-11 RX ADMIN — PANTOPRAZOLE SODIUM 40 MG: 40 TABLET, DELAYED RELEASE ORAL at 20:31

## 2021-06-11 RX ADMIN — METHYLPREDNISOLONE SODIUM SUCCINATE 60 MG: 125 INJECTION, POWDER, FOR SOLUTION INTRAMUSCULAR; INTRAVENOUS at 20:30

## 2021-06-11 NOTE — PROGRESS NOTES
"    [facility]  INPATIENT PROGRESS NOTE    Length of Stay: 1  Date of Admission: 6/8/2021  Primary Care Physician: Mami Perez APRN    Subjective   Chief Complaint \"I don't feel good\"  HPI:  Minimal if any improvement in shortness of breath over past 24 hours. Does not, however, feel that his breathing has worsened during this interval. Denies chest pain. Continues with cough, largely  Non-productive.   Discussed blood culture results with patient. He recalls previous MRSA bacteremia required ICU admission in past.     Review of Systems   All pertinent negatives and positives are as above. All other systems have been reviewed and are negative unless otherwise stated.     Objective    Temp:  [97.1 °F (36.2 °C)-97.5 °F (36.4 °C)] 97.1 °F (36.2 °C)  Heart Rate:  [66-78] 78  Resp:  [18-24] 20  BP: (121-146)/(57-81) 130/71    Physical Exam  Vitals reviewed.   Constitutional:       Appearance: He is obese.   HENT:      Head: Normocephalic.      Mouth/Throat:      Mouth: Mucous membranes are moist.   Cardiovascular:      Rate and Rhythm: Normal rate and regular rhythm.      Heart sounds: Normal heart sounds.   Pulmonary:      Effort: Pulmonary effort is normal. tachypneic with change in position, resolves with rest.      Breath sounds: Wheezing present.      Comments: Diminished in bilateral bases with faint expiratory wheeze. No rhonchi appreciated.   Abdominal:      General: Bowel sounds are normal.      Palpations: Abdomen is soft.   Skin:     General: Skin is warm and dry.   Neurological:      General: No focal deficit present.      Mental Status: He is alert and oriented to person, place, and time.   Psychiatric:         Mood and Affect: Mood normal.         Behavior: Behavior normal.          Results Review:  I have reviewed the labs, radiology results, and diagnostic studies.    Laboratory Data:   Results from last 7 days   Lab Units 06/11/21  0517 " 06/10/21  0727 06/09/21  0559 06/08/21  1620   SODIUM mmol/L 137 129* 134* 129*   POTASSIUM mmol/L 4.0 3.9 4.4 4.5   CHLORIDE mmol/L 103 98 97* 94*   CO2 mmol/L 26.0 26.0 29.0 19.0*   BUN mg/dL 10 23* 15 12   CREATININE mg/dL 0.76 1.22 1.69* 0.82   GLUCOSE mg/dL 162* 184* 301* 432*   CALCIUM mg/dL 9.0 8.8 8.9 9.2   BILIRUBIN mg/dL  --   --   --  0.4   ALK PHOS U/L  --   --   --  87   ALT (SGPT) U/L  --   --   --  20   AST (SGOT) U/L  --   --   --  24   ANION GAP mmol/L 8.0 5.0 8.0 16.0*     Estimated Creatinine Clearance: 222.1 mL/min (by C-G formula based on SCr of 0.76 mg/dL).  Results from last 7 days   Lab Units 06/08/21  1633   MAGNESIUM mg/dL 1.5*         Results from last 7 days   Lab Units 06/11/21  0517 06/10/21  0727 06/09/21  0559 06/08/21  1620   WBC 10*3/mm3 6.23 6.54 7.84 7.51   HEMOGLOBIN g/dL 12.5* 11.9* 13.5 14.5   HEMATOCRIT % 38.2 35.6* 40.5 41.9   PLATELETS 10*3/mm3 177 163 164 178     Results from last 7 days   Lab Units 06/11/21  0517 06/10/21  0727 06/09/21  0559 06/08/21  2145 06/08/21  1633   INR  1.15 1.05 0.97 0.95 0.99       Culture Data:   Blood Culture   Date Value Ref Range Status   06/09/2021 Staphylococcus, coagulase negative (C)  Final     Comment:     Isolate #1  Probable contaminant requires clinical correlation, susceptibility not performed unless requested by physician.     06/09/2021 Staphylococcus, coagulase negative (C)  Final     Comment:     Isolate#2  Probable contaminant requires clinical correlation, susceptibility not performed unless requested by physician.     06/08/2021 No growth at 2 days  Preliminary   06/08/2021 Staphylococcus, coagulase negative (C)  Final     Comment:     Probable contaminant requires clinical correlation, susceptibility not performed unless requested by physician.       No results found for: URINECX  Respiratory Culture   Date Value Ref Range Status   06/09/2021   Final    Scant growth (1+) Normal Respiratory Radha: NO S.aureus/MRSA or  Pseudomonas aeruginosa     No results found for: WOUNDCX  No results found for: STOOLCX  No components found for: BODYFLD    Radiology Data:   Imaging Results (Last 24 Hours)     ** No results found for the last 24 hours. **          I have reviewed the patient's current medications.     Assessment/Plan        Acute hypoxemic respiratory failure-secondary to CA-PNA. Patient has previously refused COVID vaccination. Testing is negative on admission. Respiratory panel confirms coronavirus and parainfluenza. Check PCT, consider discontinuation of empiric antibiotic coverage with rocephin and azithromycin. Add steroids.  Continue o2 support and bronchodliator therapy.     Pneumonia of right lower lobe due to infectious organism-as above.     Abnormal blood culture-Patient with history of MRSA bacteremia. Current prelim results suggest contaminant-discussed with Micro. Follow results.     Renal Insufficiency-SARAH with UA showing concentrated urine and proteinuria. Improved with IVF-will discontinue today. Trend labs.       ADOLFO on CPAP    Chronic pulmonary embolism (CMS/HCC)-anticoagulation with warfarin. Pharmacy following.     Morbid obesity (CMS/HCC)    Uncontrolled hypertension-Controlled with metoprolol, lisinopril, isosorbide and amlodipine.     Diabetes mellitus (CMS/HCC)-Improved control following titration of levemir. by review of accuchecks. Continue basal insulin and SSI. Steroids added today as above.     Paroxysmal atrial fibrillation (CMS/HCC)-Rate controlled with diltiazem and metoprolol. INR subtherapeutic on warfarin therapy due to noncompliance. Warfarin resumed, trend INR daily.       Discharge Planning: I expect patient to be discharged in 2-3 days.     Susy White MD      I confirmed that the patient's Advance Care Plan is present, code status is documented, or surrogate decision maker is listed in the patient's medical record.

## 2021-06-11 NOTE — PROGRESS NOTES
"Anticoagulation by Pharmacy - Warfarin    Yordy Hansen is a 40 y.o.male being continued on warfarin for atrial fibrillation/history of PE    Home regimen: Warfarin 7.5 mg or as directed by [facility]  INR Goal: 2-3    Last INR:   Lab Results   Component Value Date    INR 1.15 06/11/2021       Objective:  [Ht: 180.3 cm (71\"); Wt: (!) 197 kg (435 lb 3.2 oz)]  Lab Results   Component Value Date    INR 1.15 06/11/2021    INR 1.05 06/10/2021    INR 0.97 06/09/2021    PROTIME 15.2 06/11/2021    PROTIME 14.1 06/10/2021    PROTIME 13.3 06/09/2021     Lab Results   Component Value Date    HGB 12.5 (L) 06/11/2021    HGB 11.9 (L) 06/10/2021    HGB 13.5 06/09/2021    HCT 38.2 06/11/2021    HCT 35.6 (L) 06/10/2021    HCT 40.5 06/09/2021     06/11/2021     06/10/2021     06/09/2021       Recent Warfarin Administrations     The 5 most recent administrations since 06/04/2021 are shown below each listed medication.    Warfarin Sodium       Order Dose Date Given     warfarin (COUMADIN) tablet 10 mg 10 mg 06/10/2021     warfarin (COUMADIN) tablet 10 mg 10 mg 06/09/2021     warfarin (COUMADIN) tablet 7.5 mg 7.5 mg 06/08/2021                Assessment  Interacting medications: prasugrel  INR is subtherapeutic but started to trend up  Will continue 10 mg daily for now and increase tomorrow if the INR does not trend up more quickly  No bleeding noted    Plan:  1.  Give warfarin 10 mg tablet PO @ 1800 tonight  2.  Draw a PT/INR in AM  3.  Pharmacy will continue to follow    oBb Knight, PharmD  06/11/21 15:28 CDT       "

## 2021-06-11 NOTE — PROGRESS NOTES
Discharge Planning Assessment  [facility]     Patient Name: Yordy Hansen  MRN: [id number]  Today's Date: 6/11/2021    Admit Date: 6/8/2021    Discharge Needs Assessment     Row Name 06/11/21 1235       Living Environment    Lives With  alone    Current Living Arrangements  home/apartment/condo    Primary Care Provided by  self    Provides Primary Care For  no one    Family Caregiver if Needed  none    Quality of Family Relationships  supportive    Able to Return to Prior Arrangements  yes       Transition Planning    Patient/Family Anticipates Transition to  home    Patient/Family Anticipated Services at Transition  none    Transportation Anticipated  family or friend will provide       Discharge Needs Assessment    Equipment Currently Used at Home  cpap    Concerns to be Addressed  no discharge needs identified    Equipment Needed After Discharge  other (see comments) possibly oxygen        Discharge Plan     Row Name 06/11/21 1236       Plan    Plan  Home, no services    Patient/Family in Agreement with Plan  yes    Plan Comments  LACE complete. Lives at home alone, independent, still drives. Plans to return home at discharge needs. Denies need for HH services because he likes to get out and go. He may need home O2 and would like to get that from [facility]. GARY Rhodes,[facility]        Continued Care and Services - Admitted Since 6/8/2021    Coordination has not been started for this encounter.       Expected Discharge Date and Time     Expected Discharge Date Expected Discharge Time    Froylan 10, 2021         Demographic Summary     Row Name 06/11/21 1234       General Information    Admission Type  inpatient oip    Arrived From  emergency department    Required Notices Provided  Observation Status Notice;Important Message from Medicare    Referral Source  high risk screening        Functional Status     Row Name 06/11/21 1234       Functional Status    Usual Activity Tolerance  good    Current Activity  Tolerance  fair       Functional Status, IADL    Medications  independent    Meal Preparation  independent    Housekeeping  independent    Laundry  independent    Shopping  independent       Mental Status Summary    Recent Changes in Mental Status/Cognitive Functioning  no changes       Employment/    Employment Status  disabled    Current or Previous Occupation  other (see comments)         Psychosocial    No documentation.       Abuse/Neglect    No documentation.       Legal    No documentation.       Substance Abuse    No documentation.       Patient Forms    No documentation.           Zahira Frazier RN

## 2021-06-12 ENCOUNTER — READMISSION MANAGEMENT (OUTPATIENT)
Dept: CALL CENTER | Facility: HOSPITAL | Age: 41
End: 2021-06-12

## 2021-06-12 VITALS
HEIGHT: 71 IN | TEMPERATURE: 97 F | WEIGHT: 315 LBS | HEART RATE: 101 BPM | RESPIRATION RATE: 20 BRPM | OXYGEN SATURATION: 93 % | SYSTOLIC BLOOD PRESSURE: 131 MMHG | BODY MASS INDEX: 44.1 KG/M2 | DIASTOLIC BLOOD PRESSURE: 71 MMHG

## 2021-06-12 LAB
ANION GAP SERPL CALCULATED.3IONS-SCNC: 11 MMOL/L (ref 5–15)
BASOPHILS # BLD AUTO: 0.01 10*3/MM3 (ref 0–0.2)
BASOPHILS NFR BLD AUTO: 0.1 % (ref 0–1.5)
BUN SERPL-MCNC: 11 MG/DL (ref 6–20)
BUN/CREAT SERPL: 13.9 (ref 7–25)
CALCIUM SPEC-SCNC: 9.9 MG/DL (ref 8.6–10.5)
CHLORIDE SERPL-SCNC: 99 MMOL/L (ref 98–107)
CO2 SERPL-SCNC: 25 MMOL/L (ref 22–29)
CREAT SERPL-MCNC: 0.79 MG/DL (ref 0.76–1.27)
DEPRECATED RDW RBC AUTO: 41.8 FL (ref 37–54)
EOSINOPHIL # BLD AUTO: 0.01 10*3/MM3 (ref 0–0.4)
EOSINOPHIL NFR BLD AUTO: 0.1 % (ref 0.3–6.2)
ERYTHROCYTE [DISTWIDTH] IN BLOOD BY AUTOMATED COUNT: 14.3 % (ref 12.3–15.4)
GFR SERPL CREATININE-BSD FRML MDRD: 108 ML/MIN/1.73
GLUCOSE BLDC GLUCOMTR-MCNC: 306 MG/DL (ref 70–130)
GLUCOSE BLDC GLUCOMTR-MCNC: 341 MG/DL (ref 70–130)
GLUCOSE SERPL-MCNC: 355 MG/DL (ref 65–99)
HCT VFR BLD AUTO: 40.7 % (ref 37.5–51)
HGB BLD-MCNC: 14 G/DL (ref 13–17.7)
IMM GRANULOCYTES # BLD AUTO: 0.09 10*3/MM3 (ref 0–0.05)
IMM GRANULOCYTES NFR BLD AUTO: 0.9 % (ref 0–0.5)
LYMPHOCYTES # BLD AUTO: 1.07 10*3/MM3 (ref 0.7–3.1)
LYMPHOCYTES NFR BLD AUTO: 11.2 % (ref 19.6–45.3)
MCH RBC QN AUTO: 28.2 PG (ref 26.6–33)
MCHC RBC AUTO-ENTMCNC: 34.4 G/DL (ref 31.5–35.7)
MCV RBC AUTO: 82.1 FL (ref 79–97)
MONOCYTES # BLD AUTO: 0.19 10*3/MM3 (ref 0.1–0.9)
MONOCYTES NFR BLD AUTO: 2 % (ref 5–12)
NEUTROPHILS NFR BLD AUTO: 8.17 10*3/MM3 (ref 1.7–7)
NEUTROPHILS NFR BLD AUTO: 85.7 % (ref 42.7–76)
NRBC BLD AUTO-RTO: 0 /100 WBC (ref 0–0.2)
PLATELET # BLD AUTO: 168 10*3/MM3 (ref 140–450)
PMV BLD AUTO: 9 FL (ref 6–12)
POTASSIUM SERPL-SCNC: 4.4 MMOL/L (ref 3.5–5.2)
RBC # BLD AUTO: 4.96 10*6/MM3 (ref 4.14–5.8)
SODIUM SERPL-SCNC: 135 MMOL/L (ref 136–145)
WBC # BLD AUTO: 9.54 10*3/MM3 (ref 3.4–10.8)

## 2021-06-12 PROCEDURE — 63710000001 INSULIN ASPART PER 5 UNITS: Performed by: HOSPITALIST

## 2021-06-12 PROCEDURE — 94799 UNLISTED PULMONARY SVC/PX: CPT

## 2021-06-12 PROCEDURE — 85025 COMPLETE CBC W/AUTO DIFF WBC: CPT | Performed by: FAMILY MEDICINE

## 2021-06-12 PROCEDURE — 25010000002 METHYLPREDNISOLONE PER 125 MG: Performed by: FAMILY MEDICINE

## 2021-06-12 PROCEDURE — 82962 GLUCOSE BLOOD TEST: CPT

## 2021-06-12 PROCEDURE — 94664 DEMO&/EVAL PT USE INHALER: CPT

## 2021-06-12 PROCEDURE — 94760 N-INVAS EAR/PLS OXIMETRY 1: CPT

## 2021-06-12 PROCEDURE — 80048 BASIC METABOLIC PNL TOTAL CA: CPT | Performed by: FAMILY MEDICINE

## 2021-06-12 RX ORDER — METHYLPREDNISOLONE 4 MG/1
TABLET ORAL
Qty: 21 TABLET | Refills: 0 | Status: SHIPPED | OUTPATIENT
Start: 2021-06-12 | End: 2021-07-06

## 2021-06-12 RX ORDER — IPRATROPIUM BROMIDE AND ALBUTEROL SULFATE 2.5; .5 MG/3ML; MG/3ML
3 SOLUTION RESPIRATORY (INHALATION)
Qty: 360 ML | Refills: 1 | Status: SHIPPED | OUTPATIENT
Start: 2021-06-12

## 2021-06-12 RX ADMIN — INSULIN ASPART 20 UNITS: 100 INJECTION, SOLUTION INTRAVENOUS; SUBCUTANEOUS at 08:53

## 2021-06-12 RX ADMIN — METOPROLOL SUCCINATE 50 MG: 50 TABLET, EXTENDED RELEASE ORAL at 08:53

## 2021-06-12 RX ADMIN — RANOLAZINE 1000 MG: 500 TABLET, FILM COATED, EXTENDED RELEASE ORAL at 08:53

## 2021-06-12 RX ADMIN — PRASUGREL 10 MG: 10 TABLET, FILM COATED ORAL at 08:56

## 2021-06-12 RX ADMIN — GABAPENTIN 600 MG: 300 CAPSULE ORAL at 08:53

## 2021-06-12 RX ADMIN — INSULIN ASPART 16 UNITS: 100 INJECTION, SOLUTION INTRAVENOUS; SUBCUTANEOUS at 08:54

## 2021-06-12 RX ADMIN — DILTIAZEM HYDROCHLORIDE 120 MG: 120 CAPSULE, COATED, EXTENDED RELEASE ORAL at 08:53

## 2021-06-12 RX ADMIN — METHYLPREDNISOLONE SODIUM SUCCINATE 60 MG: 125 INJECTION, POWDER, FOR SOLUTION INTRAMUSCULAR; INTRAVENOUS at 08:53

## 2021-06-12 RX ADMIN — METHOCARBAMOL 500 MG: 500 TABLET, FILM COATED ORAL at 11:51

## 2021-06-12 RX ADMIN — SODIUM CHLORIDE, PRESERVATIVE FREE 10 ML: 5 INJECTION INTRAVENOUS at 08:55

## 2021-06-12 RX ADMIN — NYSTATIN: 100000 POWDER TOPICAL at 08:55

## 2021-06-12 RX ADMIN — IPRATROPIUM BROMIDE AND ALBUTEROL SULFATE 3 ML: 2.5; .5 SOLUTION RESPIRATORY (INHALATION) at 07:15

## 2021-06-12 RX ADMIN — INSULIN ASPART 16 UNITS: 100 INJECTION, SOLUTION INTRAVENOUS; SUBCUTANEOUS at 11:51

## 2021-06-12 RX ADMIN — METHOCARBAMOL 500 MG: 500 TABLET, FILM COATED ORAL at 08:56

## 2021-06-12 RX ADMIN — AMLODIPINE BESYLATE 5 MG: 5 TABLET ORAL at 08:53

## 2021-06-12 RX ADMIN — ISOSORBIDE MONONITRATE 120 MG: 60 TABLET, EXTENDED RELEASE ORAL at 06:13

## 2021-06-12 RX ADMIN — METHYLPREDNISOLONE SODIUM SUCCINATE 60 MG: 125 INJECTION, POWDER, FOR SOLUTION INTRAMUSCULAR; INTRAVENOUS at 04:28

## 2021-06-12 NOTE — DISCHARGE PLACEMENT REQUEST
"Keara Escobar (40 y.o. Male)     Date of Birth Social Security Number Address Home Phone MRN    1980  [id number]  [address]  [facility] [address] [phone] [id number]    Anabaptism Marital Status          None Single       Admission Date Admission Type Admitting Provider Attending Provider Department, Room/Bed    6/8/21 Emergency Melchor Blackwell MD Echendu, Anthony W, MD [address], 303/1    Discharge Date Discharge Disposition Discharge Destination         Home or Self Care              Attending Provider: Melchor Blackwell MD    Allergies: Glipizide, Reglan [Metoclopramide], Tramadol, Risperidone    Isolation: Droplet   Infection: Other (06/10/21)   Code Status: CPR    Ht: 180.3 cm (71\")   Wt: 193 kg (426 lb 6.4 oz)    Admission Cmt: None   Principal Problem: Right lower lobe pneumonia [J18.9]                 Active Insurance as of 6/8/2021     Primary Coverage     Payor Plan Insurance Group Employer/Plan Group    ANTHEM MEDICARE REPLACEMENT ANTHEM MEDICARE ADVANTAGE [id number]     Payor Plan Address Payor Plan Phone Number Payor Plan Fax Number Effective Dates    [address] [phone]  8/1/2019 - None Entered    [facility] [id number]       Subscriber Name Subscriber Birth Date Member ID       KEARA ESCOBAR 1980 [id number]           Secondary Coverage     Payor Plan Insurance Group Employer/Plan Group    [place]      Payor Plan Address Payor Plan Phone Number Payor Plan Fax Number Effective Dates    [address] [phone]  9/1/2018 - None Entered    [facility] [id number]       Subscriber Name Subscriber Birth Date Member ID       KEARA ESCOBAR 1980 [id number]                 Emergency Contacts      (Rel.) Home Phone Work Phone Mobile Phone    Hanna Escobar (Father) [phone] -- [phone]               History & Physical      Keara Goncalves MD at 06/08/21 [address] [place] [address] " [facility] Admission      Date of Admission: 6/8/2021      Primary Care Physician: Mami Perez APRN      Chief Complaint: Chest pain and shortness of breath    HPI: Patient is a 40-year-old male who is well-known to our service who presented to the emergency department with 3 days of chest pain that is much worse with exertion, shortness of breath worse with exertion, and productive cough.  He denies fever, nausea, vomiting, and diarrhea.  He states the chest pain that he has is generally sharp and persistently with breathing, but it is much worse with deep breath.    Concurrent Medical History:  has a past medical history of Asthma, CAD (coronary artery disease), Chronic back pain, Chronic pulmonary embolism (CMS/HCC), Coronary artery disease, Diabetes mellitus (CMS/HCC), Factor II deficiency (CMS/HCC), Fatty liver, GERD (gastroesophageal reflux disease), Hyperlipidemia, Hypertension, Morbid obesity (CMS/HCC), RLS (restless legs syndrome), Seizures (CMS/[facility]), and Sleep apnea.    Past Surgical History:  has a past surgical history that includes transesophageal echocardiogram (travis); pr rt/lt heart catheters (N/A, 3/15/2017); Cardiac catheterization (N/A, 3/15/2017); Cardiac catheterization (N/A, 3/15/2017); Coronary angioplasty with stent; Cardiac catheterization (N/A, 3/1/2018); Cholecystectomy; and Cardiac catheterization (N/A, 9/2/2020).    Family History: family history includes Cancer in his paternal grandfather; Heart disease in his mother; Obesity in his mother; Sleep apnea in his father.     Social History:  reports that he quit smoking about 24 years ago. His smoking use included cigarettes. He has a 0.13 pack-year smoking history. His smokeless tobacco use includes snuff. He reports that he does not drink alcohol and does not use drugs.    Allergies:   Allergies   Allergen Reactions   • Glipizide Other (See Comments) and Unknown (See Comments)     Slurred  Speech  Slurred Speech  Hallucinations, Slurred Speech   • Reglan [Metoclopramide] Anxiety   • Tramadol Other (See Comments)     seizures   • Risperidone Other (See Comments)     Slurred speech  Can't stand, trouble breathing, slurred speech         Medications:   Prior to Admission medications    Medication Sig Start Date End Date Taking? Authorizing Provider   albuterol (PROVENTIL HFA;VENTOLIN HFA) 108 (90 BASE) MCG/ACT inhaler Inhale 2 puffs Every 4 (Four) Hours As Needed for Wheezing.    Provider, MD Latrice   amLODIPine (NORVASC) 5 MG tablet Take 1 tablet by mouth Daily. 4/2/21   Dinah Lin MD   atorvastatin (LIPITOR) 80 MG tablet Take 1 tablet by mouth Every Night. 9/29/20   Mami Perez APRN   azithromycin (Zithromax Z-Luca) 250 MG tablet Take 2 tabs on Day 1, then 1 tab daily for 4 additional days 4/15/21   Elyssa Penn APRN   dilTIAZem CD (Cardizem CD) 120 MG 24 hr capsule Take 1 capsule by mouth Daily. 10/8/20   Judy Crews MD   Evolocumab (Repatha SureClick) solution auto-injector SureClick injection Inject 1 mL under the skin into the appropriate area as directed Every 14 (Fourteen) Days. 4/8/21   Mami Perez APRN   fenofibrate micronized (LOFIBRA) 134 MG capsule Take 1 capsule by mouth Every Morning Before Breakfast. 9/29/20   Mami Perez APRN   gabapentin (NEURONTIN) 300 MG capsule Take 2 capsules by mouth 3 (Three) Times a Day. 6/3/21   Mami Perez APRN   glucose blood test strip Use as instructed 1/8/21   Mami Perez APRN   glucose monitor monitoring kit 1 each As Needed (check blood glucose 3x daily). 9/29/20   Mami Perez APRN   insulin glargine (LANTUS) 100 UNIT/ML injection Inject 60 Units under the skin into the appropriate area as directed Every Night. 9/29/20   Mami Perez APRN   insulin lispro (HumaLOG) 100 UNIT/ML injection Inject 15 Units under the skin into the appropriate area as  "directed 3 (Three) Times a Day Before Meals.  Patient taking differently: Inject 25 Units under the skin into the appropriate area as directed 3 (Three) Times a Day Before Meals. 9/29/20   Mami Perez APRN   isosorbide mononitrate (IMDUR) 120 MG 24 hr tablet Take 1 tablet by mouth Every Morning for 30 days. 10/10/17 9/29/21  Minesh Gregg MD   lisinopril (PRINIVIL,ZESTRIL) 40 MG tablet Take 1 tablet by mouth Every Night. 9/29/20   Mami Perez APRN   methocarbamol (Robaxin) 500 MG tablet Take 1 tablet by mouth 4 (Four) Times a Day. 2/2/21   Mami Perez APRN   methylPREDNISolone (MEDROL) 4 MG dose pack Take as directed on package instructions. 4/15/21   Elyssa Penn APRN   metoprolol succinate XL (TOPROL-XL) 50 MG 24 hr tablet Take 1 tablet by mouth Daily. 9/29/20   Mami Perez APRN   Microlet Lancets misc One touch delica lancets 1/8/21   Mami Perez APRN   nitroglycerin (NITROSTAT) 0.4 MG SL tablet Place 1 tablet under the tongue Every 5 (Five) Minutes As Needed for Chest Pain for up to 15 days. 10/10/17 9/29/21  Minesh Gregg MD   pantoprazole (PROTONIX) 40 MG EC tablet Take 1 tablet by mouth Every Night. 9/29/20   Mami Perez APRN   pramipexole (MIRAPEX) 1 MG tablet Take 2 tablets by mouth Every Night. 9/29/20   Mami Perez APRN   prasugrel (EFFIENT) 10 MG tablet Take 1 tablet by mouth Daily. 9/29/20   Mami Perez APRN   ranolazine (RANEXA) 1000 MG 12 hr tablet Take 1 tablet by mouth Every 12 (Twelve) Hours. 2/2/21   Mami Perez APRN   ReliOn Insulin Syringe 31G X 15/64\" 0.5 ML misc For use with insulin 9/29/20   Mami Perez APRN   SITagliptin (Januvia) 100 MG tablet Take 1 tablet by mouth Daily. 1/8/21   Mami Perez APRN   traZODone (DESYREL) 300 MG tablet Take 1 tablet by mouth every night at bedtime. 9/29/20   Mami Perez APRN   warfarin (Coumadin) 7.5 MG " tablet Take 1 tablet nightly or as directed by [facility] 4/9/21   Silvestre Woodward, LALA       Review of Systems:  Review of Systems   Constitutional: Positive for activity change and fatigue. Negative for appetite change, chills, fever and unexpected weight change.   HENT: Negative for congestion, facial swelling, hearing loss, nosebleeds, rhinorrhea, sneezing, trouble swallowing and voice change.    Eyes: Negative for photophobia and visual disturbance.   Respiratory: Positive for shortness of breath. Negative for apnea, cough, choking, chest tightness, wheezing and stridor.    Cardiovascular: Positive for chest pain. Negative for palpitations and leg swelling.   Gastrointestinal: Negative for abdominal pain, blood in stool, constipation, diarrhea, nausea and vomiting.   Endocrine: Negative for cold intolerance, heat intolerance, polydipsia, polyphagia and polyuria.   Genitourinary: Negative for dysuria, flank pain and hematuria.   Musculoskeletal: Negative for arthralgias, back pain, myalgias and neck pain.   Skin: Negative for rash and wound.   Allergic/Immunologic: Negative for immunocompromised state.   Neurological: Negative for dizziness, seizures, syncope, speech difficulty, weakness, light-headedness, numbness and headaches.   Hematological: Does not bruise/bleed easily.   Psychiatric/Behavioral: Negative for agitation, behavioral problems, confusion, decreased concentration, hallucinations, self-injury and suicidal ideas. The patient is not nervous/anxious.       Otherwise complete ROS is negative except as mentioned above.    Physical Exam:   Temp:  [97.8 °F (36.6 °C)] 97.8 °F (36.6 °C)  Heart Rate:  [] 96  Resp:  [26] 26  BP: (132-201)/(78-96) 139/84     Physical Exam  Constitutional:       Appearance: He is well-developed. He is morbidly obese. He is ill-appearing.      Comments: BMI 60.61   HENT:      Head: Normocephalic and atraumatic.      Nose: Nose normal.   Eyes:      General: Lids  are normal. No scleral icterus.     Conjunctiva/sclera: Conjunctivae normal.      Pupils: Pupils are equal, round, and reactive to light.   Neck:      Vascular: No JVD.      Trachea: No tracheal tenderness or tracheal deviation.   Cardiovascular:      Rate and Rhythm: Normal rate and regular rhythm.      Pulses: Normal pulses.      Heart sounds: Normal heart sounds, S1 normal and S2 normal. No murmur heard.   No friction rub. No gallop.    Pulmonary:      Effort: Pulmonary effort is normal. No accessory muscle usage or respiratory distress.      Breath sounds: Decreased breath sounds and rhonchi present. No wheezing or rales.   Chest:      Chest wall: No tenderness.   Abdominal:      General: Bowel sounds are normal. There is no distension.      Palpations: Abdomen is soft. There is no mass.      Tenderness: There is no abdominal tenderness. There is no guarding or rebound.   Musculoskeletal:         General: No tenderness.      Cervical back: Normal range of motion and neck supple. No spinous process tenderness.      Right lower leg: Edema present.      Left lower leg: Edema present.   Skin:     General: Skin is warm.      Coloration: Skin is not pale.      Findings: No rash.   Neurological:      Mental Status: He is alert and oriented to person, place, and time.      Cranial Nerves: No cranial nerve deficit.      Sensory: No sensory deficit.      Motor: No atrophy, abnormal muscle tone or seizure activity.      Coordination: Coordination normal.      Deep Tendon Reflexes: Reflexes are normal and symmetric. Reflexes normal.   Psychiatric:         Behavior: Behavior normal.         Thought Content: Thought content normal.         Judgment: Judgment normal.       Results Reviewed:  I have personally reviewed current lab, radiology, and data and agree with results.  Lab Results (last 24 hours)     Procedure Component Value Units Date/Time    Urinalysis, Microscopic Only - Urine, Clean Catch [[id number]] Collected:  06/08/21 1756    Specimen: Urine, Clean Catch Updated: 06/08/21 1915     RBC, UA None Seen /HPF      WBC, UA 0-2 /HPF      Bacteria, UA None Seen /HPF      Squamous Epithelial Cells, UA 0-2 /HPF      Hyaline Casts, UA None Seen /LPF      Methodology Manual Light Microscopy    COVID-19 and FLU A/B PCR - Swab, Nasopharynx [[id number]]  (Normal) Collected: 06/08/21 1816    Specimen: Swab from Nasopharynx Updated: 06/08/21 1852     COVID19 Not Detected     Influenza A PCR Not Detected     Influenza B PCR Not Detected    Narrative:      Fact sheet for providers: https://www.fda.gov/media/947185/download    Fact sheet for patients: https://www.fda.gov/media/696842/download    Test performed by PCR.    Blood Gas, Venous - [[id number]]  (Abnormal) Collected: 06/08/21 1802    Specimen: Venous Blood Updated: 06/08/21 1841     Site Venous     pH, Venous 7.403 pH Units      pCO2, Venous 45.3 mm Hg      pO2, Venous 63.5 mm Hg      HCO3, Venous 28.2 mmol/L      Base Excess, Venous 2.8 mmol/L      Barometric Pressure for Blood Gas --     Comment: PATM not performed at this facility.        Modality N/A    Troponin [[id number]]  (Normal) Collected: 06/08/21 1802    Specimen: Blood Updated: 06/08/21 1829     Troponin T <0.010 ng/mL     Narrative:      Troponin T Reference Range:  <= 0.03 ng/mL-   Negative for AMI  >0.03 ng/mL-     Abnormal for myocardial necrosis.  Clinicians would have to utilize clinical acumen, EKG, Troponin and serial changes to determine if it is an Acute Myocardial Infarction or myocardial injury due to an underlying chronic condition.       Results may be falsely decreased if patient taking Biotin.      Blood Gas, Venous With Co-Ox [[id number]]  (Abnormal) Collected: 06/08/21 1800    Specimen: Venous Blood Updated: 06/08/21 1811     Site OTHER     pH, Venous 7.403 pH Units      pCO2, Venous 45.3 mm Hg      pO2, Venous 63.5 mm Hg      Comment: 83 Value above reference range        HCO3, Venous 28.2 mmol/L       Comment: 83 Value above reference range        Base Excess, Venous 2.8 mmol/L      Comment: 83 Value above reference range        O2 Saturation, Venous 92.7 %      Comment: 83 Value above reference range        Hemoglobin, Blood Gas 14.7 g/dL      Oxyhemoglobin Venous 90.5 %      Comment: 83 Value above reference range        Methemoglobin Venous 0.9 %      Carboxyhemoglobin Venous 1.4 %      Sodium, Venous 134 mmol/L      Comment: 84 Value below reference range        Potassium, Venous 4.2 mmol/L      Barometric Pressure for Blood Gas 747 mmHg      Modality Room Air     Ventilator Mode NA     Note --     Collected by RN     Comment: Meter: [id number]     :  [id number]       Urinalysis With Microscopic If Indicated (No Culture) - Urine, Clean Catch [[id number]]  (Abnormal) Collected: 06/08/21 1756    Specimen: Urine, Clean Catch Updated: 06/08/21 1808     Color, UA Yellow     Appearance, UA Clear     pH, UA 5.5     Specific Enloe, UA 1.039     Comment: Result obtained by Refractometer        Glucose, UA >=1000 mg/dL (3+)     Ketones, UA Negative     Bilirubin, UA Negative     Blood, UA Negative     Protein,  mg/dL (2+)     Leuk Esterase, UA Negative     Nitrite, UA Negative     Urobilinogen, UA 1.0 E.U./dL    Extra Tubes [[id number]] Collected: 06/08/21 1633    Specimen: Blood Updated: 06/08/21 1746    Narrative:      The following orders were created for panel order Extra Tubes.  Procedure                               Abnormality         Status                     ---------                               -----------         ------                     Lavender Top[[id number]]                                     Final result               Green Top (Gel)[[id number]]                                  Final result                 Please view results for these tests on the individual orders.    Lavender Top [[id number]] Collected: 06/08/21 1633    Specimen: Blood Updated: 06/08/21 1746     Extra Tube hold  for add-on     Comment: Auto resulted       Green Top (Gel) [[id number]] Collected: 06/08/21 1633    Specimen: Blood Updated: 06/08/21 1746     Extra Tube Hold for add-ons.     Comment: Auto resulted.       [place] Draw [[id number]] Collected: 06/08/21 1620    Specimen: Blood Updated: 06/08/21 1745    Narrative:      The following orders were created for panel order [place] Draw.  Procedure                               Abnormality         Status                     ---------                               -----------         ------                     Light Blue Top[[id number]]                                   Final result               Green Top (Gel)[[id number]]                                  Final result               Lavender Top[[id number]]                                     Final result               Gold Top - SST[[id number]]                                   Final result                 Please view results for these tests on the individual orders.    Light Blue Top [[id number]] Collected: 06/08/21 1633    Specimen: Blood Updated: 06/08/21 1745     Extra Tube hold for add-on     Comment: Auto resulted       Gold Top - SST [[id number]] Collected: 06/08/21 1633    Specimen: Blood Updated: 06/08/21 1745     Extra Tube Hold for add-ons.     Comment: Auto resulted.       Acetone [[id number]]  (Normal) Collected: 06/08/21 1633    Specimen: Blood Updated: 06/08/21 1737     Acetone Negative    Magnesium [[id number]]  (Abnormal) Collected: 06/08/21 1633    Specimen: Blood Updated: 06/08/21 1734     Magnesium 1.5 mg/dL     Lavender Top [[id number]] Collected: 06/08/21 1620    Specimen: Blood Updated: 06/08/21 1730     Extra Tube hold for add-on     Comment: Auto resulted       Green Top (Gel) [[id number]] Collected: 06/08/21 1620    Specimen: Blood Updated: 06/08/21 1730     Extra Tube Hold for add-ons.     Comment: Auto resulted.       Comprehensive Metabolic Panel [[id number]]  (Abnormal) Collected: 06/08/21 1620    Specimen:  Blood Updated: 06/08/21 1719     Glucose 432 mg/dL      BUN 12 mg/dL      Creatinine 0.82 mg/dL      Sodium 129 mmol/L      Potassium 4.5 mmol/L      Chloride 94 mmol/L      CO2 19.0 mmol/L      Calcium 9.2 mg/dL      Total Protein 7.1 g/dL      Albumin 3.30 g/dL      ALT (SGPT) 20 U/L      AST (SGOT) 24 U/L      Alkaline Phosphatase 87 U/L      Total Bilirubin 0.4 mg/dL      eGFR Non African Amer 103 mL/min/1.73      Globulin 3.8 gm/dL      A/G Ratio 0.9 g/dL      BUN/Creatinine Ratio 14.6     Anion Gap 16.0 mmol/L     Narrative:      GFR Normal >60  Chronic Kidney Disease <60  Kidney Failure <15      CBC & Differential [[id number]]  (Abnormal) Collected: 06/08/21 1620    Specimen: Blood Updated: 06/08/21 1702    Narrative:      The following orders were created for panel order CBC & Differential.  Procedure                               Abnormality         Status                     ---------                               -----------         ------                     Scan Slide[[id number]]                   Normal              Final result               CBC Auto Differential[[id number]]        Abnormal            Final result                 Please view results for these tests on the individual orders.    Scan Slide [[id number]]  (Normal) Collected: 06/08/21 1620    Specimen: Blood Updated: 06/08/21 1702     RBC Morphology Normal     WBC Morphology Normal     Platelet Morphology Normal    Protime-INR [[id number]]  (Normal) Collected: 06/08/21 1633    Specimen: Blood Updated: 06/08/21 1658     Protime 13.5 Seconds      INR 0.99    Narrative:      Therapeutic range for most indications is 2.0-3.0 INR,  or 2.5-3.5 for mechanical heart valves.    Troponin [[id number]]  (Normal) Collected: 06/08/21 1620    Specimen: Blood Updated: 06/08/21 1651     Troponin T <0.010 ng/mL     Narrative:      Troponin T Reference Range:  <= 0.03 ng/mL-   Negative for AMI  >0.03 ng/mL-     Abnormal for myocardial necrosis.  Clinicians would  have to utilize clinical acumen, EKG, Troponin and serial changes to determine if it is an Acute Myocardial Infarction or myocardial injury due to an underlying chronic condition.       Results may be falsely decreased if patient taking Biotin.      BNP [[id number]]  (Normal) Collected: 21 162    Specimen: Blood Updated: 21 1649     proBNP 46.9 pg/mL     Narrative:      Among patients with dyspnea, NT-proBNP is highly sensitive for the detection of acute congestive heart failure. In addition NT-proBNP of <300 pg/ml effectively rules out acute congestive heart failure with 99% negative predictive value.    Results may be falsely decreased if patient taking Biotin.      CBC Auto Differential [[id number]]  (Abnormal) Collected: 21 162    Specimen: Blood Updated: 21 1634     WBC 7.51 10*3/mm3      RBC 5.16 10*6/mm3      Hemoglobin 14.5 g/dL      Hematocrit 41.9 %      MCV 81.2 fL      MCH 28.1 pg      MCHC 34.6 g/dL      RDW 13.9 %      RDW-SD 40.4 fl      MPV 9.2 fL      Platelets 178 10*3/mm3      Neutrophil % 66.5 %      Lymphocyte % 18.4 %      Monocyte % 10.1 %      Eosinophil % 3.6 %      Basophil % 0.3 %      Immature Grans % 1.1 %      Neutrophils, Absolute 5.00 10*3/mm3      Lymphocytes, Absolute 1.38 10*3/mm3      Monocytes, Absolute 0.76 10*3/mm3      Eosinophils, Absolute 0.27 10*3/mm3      Basophils, Absolute 0.02 10*3/mm3      Immature Grans, Absolute 0.08 10*3/mm3      nRBC 0.0 /100 WBC         Imaging Results (Last 24 Hours)     Procedure Component Value Units Date/Time    CT Angiogram Chest [[id number]] Collected: 21 173     Updated: 21 175    Narrative:      PROCEDURE: CT ANGIOGRAM CHEST      .        EXAM:  Computed Tomography with CTA         REGION:  Chest         INDICATION:   Chest pain, shortness of breath  - rule out  pulmonary embolism         CORRELATIVE IMAGIN2021                          TECHNIQUE:             - PE / vascular protocol                - reconstructions:  axial, coronal, sagittal, obliques     - computer-generated 3D reconstructions (MIPS) were performed.                - contrast:  intravenous 74 mL of Isovue-370                         This exam was performed according to our departmental  dose-optimization program, which includes automated exposure  control, adjustment of the mA and/or kV according to patient size  and/or use of iterative reconstruction technique.  DLP is 1967.9              COMMENTS:                               - Pulmonary arterial system:      - Main pulmonary artery trunk:  negative      - Left, right main pulmonary arteries: negative      - Lobar arteries: negative      - Segmental arteries: Limited evaluation of segmental  arteries due to contrast phase       - Systemic vascularity (as visualized):        - Aorta:  grossly negative / normal caliber / no dissection        - roots of great vessels:  grossly negative / normal caliber         - SVC / IVC:  grossly negative / normal caliber         - Misc (limited visualization):      - pulmonary parenchyma:  New, patchy airspace opacification  in the right lower lobe      - pleura:  negative      - mediastinal / glendy:  negative      - neck, inferior:  grossly wnl      - subdiaphragmatic structures: Status post cholecystectomy-  osseous:  Old healed left-sided rib fractures are present, as  previously      - misc:  Small hiatal hernia      .      Impression:      1.  No evidence of pulmonary embolism, with the segmental  branches less than optimally evaluated due to contrast phase no  large, central filling defect identified.  2.  No evidence of pathology associated with the visualized  aorta.      3. New patchy airspace opacification in the right lower lobe,  suspicious for pneumonia. Follow-up to complete radiographic  resolution recommended    Electronically signed by:  Gela Shaffer MD  6/8/2021 5:56 PM CDT  Workstation: [id number]            Assessment:    Active  Hospital Problems    Diagnosis    • Pneumonia of right lower lobe due to infectious organism    • Paroxysmal atrial fibrillation (CMS/[facility])    • Hypertension    • Type 2 diabetes mellitus with hyperglycemia, with long-term current use of insulin (CMS/[facility])    • Chronic pulmonary embolism (CMS/[facility])    • RLS (restless legs syndrome)    • Hyperlipidemia    • Class 3 severe obesity due to excess calories with serious comorbidity and body mass index (BMI) of 60.0 to 69.9 in adult (CMS/[facility])              Plan:  1.  Right lower lobe pneumonia: Continue empiric antibiotics.  Covid screen negative.  2.  Dyspnea: Shortness of breath is likely worse due to pneumonia.  3.  Paroxysmal atrial fibrillation: Continue home medication.  4.  History of pulmonary embolism: Continue anticoagulation.  CTA negative for current pulmonary embolism.  5.  Diabetes mellitus: Poorly controlled.  Will place back on home long-acting insulin and sliding scale.  6.  Morbid obesity: BMI 60.61.  Lifestyle modification recommended.  7.  Obstructive sleep apnea: Continue CPAP.  8.  Coronary artery disease: Chest pain most likely pleuritic in nature.  Continue home medications.  9.  DVT prophylaxis: Home Coumadin.    Of note, patient states he is not been able to afford any of his medications.  States he has had no medications in the last 3 weeks at least.  Will consult case management.    I confirmed that the patient's Advance Care Plan is present, code status is documented, or surrogate decision maker is listed in the patient's medical record.     I have utilized all available immediate resources to obtain, update, or review the patient's current medications.     I discussed the patient's findings and my recommendations with: Patient        This document has been electronically signed by Yordy Goncalves MD on June 8, 2021 19:22 CDT                    Electronically signed by Yordy Goncalves MD at 06/08/21 1944       [place] [id number]  "[address] [address]  Dept. Phone:  [phone]  Dept. Fax:  [phone] Date Ordered: 2021         Patient:  Yordy Hansen MRN:  [id number]   [id number]  [address]  [facility] [address] :  1980  SSN:    Phone: [phone] Sex:  M     Weight: 193 kg (426 lb 6.4 oz)         Ht Readings from Last 1 Encounters:   21 180.3 cm (71\")         Home Nebulizer          (Order ID: [id number])    Diagnosis:  Pneumonia of right lower lobe due to infectious organism (J18.9 [ICD-10-CM] 486 [ICD-9-CM])   Quantity:  1     Nebulizer Equipment:  Nebulizer w/ Compressor  Nebulizer Accessories:  Nebulizer Kit - Administration Set, Non-Disposable  The face to face evaluation was performed on: 2021  Length of Need (99 Months = Lifetime): 99 Months = Lifetime        Authorizing Provider's Phone: [phone]   Authorizing Provider:Rehan Griffiths MD  Authorizing Provider's NPI: [id number]  Order Entered By: Rehan Griffiths MD 2021 10:02 AM     Electronically signed by: Rehan Griffiths MD 2021 10:02 AM          "

## 2021-06-12 NOTE — DISCHARGE SUMMARY
Discharge Summary  Rehan Griffiths MD  Hospitalist     Date of Discharge:  6/12/2021    Discharge Diagnosis:      Right lower lobe pneumonia    Uncontrolled hypertension    ADOLFO on CPAP    Chronic pulmonary embolism (CMS/HCC)    Morbid obesity (CMS/HCC)    Diabetes mellitus (CMS/HCC)    Hyponatremia      Presenting Problem/History of Present Illness  Shortness of breath and cough    Hospital Course  Patient is a 40 y.o. male admitted for shortness of breath, cough related to a probable right lower lobe pneumonia. He improved with supportive care, IV antibiotics, nebulized treatments. His oxygen saturation on room air is within normal limit now, his vital signs are stable, he is afebrile, his white count is down to normal and he will be discharged home.     He received 4 days worth of IV antibiotics as his blood cultures identified coagulase-negative Staphylococcus (1/4 blood cultures positive, assumed to be a contaminant).     Consults:   Consults     No orders found from 5/10/2021 to 6/9/2021.        Condition on Discharge:  stable    Vital Signs  Temp:  [97 °F (36.1 °C)-97.3 °F (36.3 °C)] 97 °F (36.1 °C)  Heart Rate:  [] 101  Resp:  [16-25] 20  BP: (131-158)/(71-95) 131/71    Physical Exam:  Physical Exam  Vitals reviewed.   Constitutional:       General: He is not in acute distress.     Appearance: He is obese. He is ill-appearing.   HENT:      Head: Normocephalic and atraumatic.   Eyes:      General: No scleral icterus.     Extraocular Movements: Extraocular movements intact.      Pupils: Pupils are equal, round, and reactive to light.   Cardiovascular:      Rate and Rhythm: Normal rate and regular rhythm.   Pulmonary:      Effort: No respiratory distress.      Breath sounds: Normal breath sounds. No stridor. No wheezing, rhonchi or rales.   Abdominal:      General: There is no distension.      Palpations: There is no mass.      Tenderness: There is no abdominal tenderness. There is no right CVA tenderness,  left CVA tenderness or guarding.   Musculoskeletal:         General: No swelling, tenderness or deformity. Normal range of motion.      Cervical back: Normal range of motion and neck supple. No rigidity or tenderness.      Right lower leg: No edema.      Left lower leg: No edema.   Skin:     General: Skin is warm and dry.      Coloration: Skin is not jaundiced or pale.      Findings: No bruising or lesion.   Neurological:      General: No focal deficit present.      Mental Status: He is alert and oriented to person, place, and time. Mental status is at baseline.      Cranial Nerves: No cranial nerve deficit.      Sensory: No sensory deficit.      Motor: No weakness.      Gait: Gait normal.   Psychiatric:         Mood and Affect: Mood normal.         Behavior: Behavior normal.         Discharge Disposition  Home or Self Care    Discharge Medications     Discharge Medications      New Medications      Instructions Start Date   ipratropium-albuterol 0.5-2.5 mg/3 ml nebulizer  Commonly known as: DUO-NEB   3 mL, Nebulization, 4 Times Daily - RT         Continue These Medications      Instructions Start Date   albuterol sulfate  (90 Base) MCG/ACT inhaler  Commonly known as: PROVENTIL HFA;VENTOLIN HFA;PROAIR HFA   2 puffs, Inhalation, Every 4 Hours PRN      amLODIPine 5 MG tablet  Commonly known as: NORVASC   5 mg, Oral, Every 24 Hours Scheduled      atorvastatin 80 MG tablet  Commonly known as: LIPITOR   80 mg, Oral, Nightly      dilTIAZem  MG 24 hr capsule  Commonly known as: Cardizem CD   120 mg, Oral, Daily      fenofibrate micronized 134 MG capsule  Commonly known as: LOFIBRA   134 mg, Oral, Every Morning Before Breakfast      gabapentin 300 MG capsule  Commonly known as: NEURONTIN   600 mg, Oral, 3 Times Daily      glucose blood test strip   Use as instructed      glucose monitor monitoring kit   1 each, Does not apply, As Needed      insulin glargine 100 UNIT/ML injection  Commonly known as: LANTUS,  "SEMGLEE   60 Units, Subcutaneous, Nightly      insulin lispro 100 UNIT/ML injection  Commonly known as: HumaLOG   25 Units, Subcutaneous, 3 Times Daily Before Meals      isosorbide mononitrate 120 MG 24 hr tablet  Commonly known as: IMDUR   120 mg, Oral, Every Morning      lisinopril 40 MG tablet  Commonly known as: PRINIVIL,ZESTRIL   40 mg, Oral, Nightly      methocarbamol 500 MG tablet  Commonly known as: Robaxin   500 mg, Oral, 4 Times Daily      methylPREDNISolone 4 MG dose pack  Commonly known as: MEDROL   Take as directed on package instructions.      metoprolol succinate XL 50 MG 24 hr tablet  Commonly known as: TOPROL-XL   50 mg, Oral, Daily      Microlet Lancets misc   One touch delica lancets      nitroglycerin 0.4 MG SL tablet  Commonly known as: NITROSTAT   0.4 mg, Sublingual, Every 5 Minutes PRN      pantoprazole 40 MG EC tablet  Commonly known as: PROTONIX   40 mg, Oral, Nightly      pramipexole 1 MG tablet  Commonly known as: MIRAPEX   2 mg, Oral, Nightly      prasugrel 10 MG tablet  Commonly known as: EFFIENT   10 mg, Oral, Daily      ranolazine 1000 MG 12 hr tablet  Commonly known as: RANEXA   1,000 mg, Oral, Every 12 Hours Scheduled      ReliOn Insulin Syringe 31G X 15/64\" 0.5 ML misc  Generic drug: Insulin Syringe-Needle U-100   For use with insulin      Repatha SureClick solution auto-injector SureClick injection  Generic drug: Evolocumab   140 mg, Subcutaneous, Every 14 Days      SITagliptin 100 MG tablet  Commonly known as: Januvia   100 mg, Oral, Daily      traZODone 300 MG tablet  Commonly known as: DESYREL   300 mg, Oral, Every Night at Bedtime      warfarin 7.5 MG tablet  Commonly known as: Coumadin   Take 1 tablet nightly or as directed by Coumadin Clinic         Stop These Medications    azithromycin 250 MG tablet  Commonly known as: Zithromax Z-Luca            Discharge Diet:   Diet Instructions     Diet: Consistent Carbohydrate      Discharge Diet: Consistent Carbohydrate    "       Activity at Discharge:   Activity Instructions     Activity as Tolerated            Follow-up Appointments  Future Appointments   Date Time Provider Department [place]   7/6/2021  9:00 AM Mami Perez APRN [facility] POW [facility]   7/8/2021 10:15 AM NURSE  [facility] OPI [facility]   7/8/2021 10:45 AM Karson Ibanez MD [facility] ONC [facility]     Additional Instructions for the Follow-ups that You Need to Schedule     Discharge Follow-up with PCP   As directed       Currently Documented PCP:    Mami Perez APRN    PCP Phone Number:    [phone]     Follow Up Details: in 1 week               Test Results Pending at Discharge  Pending Labs     Order Current Status    Blood Culture - Blood, Arm, Left Preliminary result           Rehan Griffiths MD  06/12/21  14:12 CDT

## 2021-06-12 NOTE — PROGRESS NOTES
Item 0 Points 1 Point 2 Points 3 Points 4 Points Subtotal   Mental Status Alert, oriented, cooperative Lethargic, follows commands Confused, not following commands Obtunded or Somnolent Comatose 0   Respiratory Pattern Regular RR 8-16 breaths/minute Increased RR 18-25 breaths/minute Dyspnea on exertion, irregular RR 26-30 breaths/minute Shortness of breath,  RR 31-35 breaths/minute Accessory muscle use, severe SOB  RR > 35 breaths/minute 1   Breath Sounds Clear Decreased unilaterally Decreased bilaterally Basilar crackles Wheezing and/or rhonchi 4   Cough Strong, spontaneous, non-productive Strong productive Weak, non-productive Weak, productive or weak with rhonchi Absent or may require suctioning 1   Pulmonary Status Nonsmoker, no previous history, >1 year quit < 1 PPD  <1 year quit > or = 1 PPD Diagnosed pulmonary disease (severe or chronic) Severe or chronic pulmonary disease with exacerbation 0   Surgical Status None General surgery (non-abdominal or non-thoracic) Lower abdominal Thoracic or upper abdominal Thoracic with pulmonary disease 0   Chest X-ray Clear Chronic changes Infiltrates, atelectasis or pleural effusion Infiltrates in > 1 lobe Diffuse infiltrates and atelectasis and/or effusions 0   Activity   Level Ambulatory Ambulatory with assistance Non-ambulatory Paraplegic Quadriplegic 0        Total Score   6   Score    Drug Therapy Frequency 20 or >    Q4 Duoneb with Q2 Albuterol PRN 15-19    Q6 Duoneb with Q4 Albuterol PRN 10-14    QID Duoneb with Q4 Albuterol PRN 5-9    TID Duoneb with Q6 Albuterol PRN 0-4    Q4 PRN Duoneb                  Lung Expansion Therapy (PEP) Bronchopulmonary Hygiene (CPT)   Q4 & PRN - Severe atelectasis, poor oxygenation Q4 - Copious secretions, dyspnea, unable to sleep   QID - High risk for persistent atelectasis, existence of atelectasis QID & Q4 PRN - Moderate secretion production   TID - At risk for developing atelectasis TID - Small amounts of secretions with poor cough    BID - Prevention of atelectasis BID - Unable to breathe deeply and cough spontaneously     RT Comments / Recommendations  Per RT evaluation, patient to continue TID duonebs with Albuterol Q6 prn.

## 2021-06-12 NOTE — OUTREACH NOTE
Prep Survey      Responses   [facility] facility patient discharged from?  [place]   Is LACE score < 7 ?  No   Emergency Room discharge w/ pulse ox?  No   Eligibility  [facility]   Date of Admission  06/08/21   Date of Discharge  06/12/21   Discharge Disposition  Home or Self Care   Discharge diagnosis  Right lower lobe pneumonia   Does the patient have one of the following disease processes/diagnoses(primary or secondary)?  COPD/Pneumonia   Does the patient have Home health ordered?  No   Is there a DME ordered?  Yes   What DME was ordered?  [facility] he will call Monday to see if he can get a new nebulizer   Prep survey completed?  Yes          Radha Lopez RN

## 2021-06-12 NOTE — PLAN OF CARE
Goal Outcome Evaluation:               VSS, weight decreased, FSBS elevated this am, but pt states he feels like he can breathe better after he started rcving iv steroids.

## 2021-06-13 LAB — BACTERIA SPEC AEROBE CULT: NORMAL

## 2021-06-14 ENCOUNTER — TRANSITIONAL CARE MANAGEMENT TELEPHONE ENCOUNTER (OUTPATIENT)
Dept: CALL CENTER | Facility: HOSPITAL | Age: 41
End: 2021-06-14

## 2021-06-14 NOTE — PAYOR COMM NOTE
"Estrella Quintero  [facility]  P: [phone]  F: [phone]      Ref#[id number]    Keara Escobar (40 y.o. Male)     Date of Birth Social Security Number Address Home Phone MRN    1980  [id number]  [address]  [facility] [address] [phone] [id number]    Orthodoxy Marital Status          None Single       Admission Date Admission Type Admitting Provider Attending Provider Department, Room/Bed    6/8/21 Emergency Echendu, Melchor GRADY MD  [address], 303/1    Discharge Date Discharge Disposition Discharge Destination        6/12/2021 Home or Self Care              Attending Provider: (none)   Allergies: Glipizide, Reglan [Metoclopramide], Tramadol, Risperidone    Isolation: None   Infection: Other (06/10/21)   Code Status: Prior    Ht: 180.3 cm (71\")   Wt: 193 kg (426 lb 6.4 oz)    Admission Cmt: None   Principal Problem: Right lower lobe pneumonia [J18.9]                 Active Insurance as of 6/8/2021     Primary Coverage     Payor Plan Insurance Group Employer/Plan Group    ANTHEM MEDICARE REPLACEMENT ANTHEM MEDICARE ADVANTAGE [id number]     Payor Plan Address Payor Plan Phone Number Payor Plan Fax Number Effective Dates    [address] [phone]  8/1/2019 - None Entered    [facility] [address]       Subscriber Name Subscriber Birth Date Member ID       KEARA ESCOBAR 1980 [id number]           Secondary Coverage     Payor Plan Insurance Group Employer/Plan Group    [place]      Payor Plan Address Payor Plan Phone Number Payor Plan Fax Number Effective Dates    [address] [phone]  9/1/2018 - None Entered    [facility] [id number]       Subscriber Name Subscriber Birth Date Member ID       KEARA ESCOBAR 1980 [id number]                 Emergency Contacts      (Rel.) Home Phone Work Phone Mobile Phone    Hanna Escobar (Father) [phone] -- [phone]               Discharge Summary    "   Rehan Griffiths MD at 06/12/21 1003          Discharge Summary  Rehan Griffiths MD  Hospitalist     Date of Discharge:  6/12/2021    Discharge Diagnosis:      Right lower lobe pneumonia    Uncontrolled hypertension    ADOLFO on CPAP    Chronic pulmonary embolism (CMS/HCC)    Morbid obesity (CMS/HCC)    Diabetes mellitus (CMS/HCC)    Hyponatremia      Presenting Problem/History of Present Illness  Shortness of breath and cough    Hospital Course  Patient is a 40 y.o. male admitted for shortness of breath, cough related to a probable right lower lobe pneumonia. He improved with supportive care, IV antibiotics, nebulized treatments. His oxygen saturation on room air is within normal limit now, his vital signs are stable, he is afebrile, his white count is down to normal and he will be discharged home.     He received 4 days worth of IV antibiotics as his blood cultures identified coagulase-negative Staphylococcus (1/4 blood cultures positive, assumed to be a contaminant).     Consults:   Consults     No orders found from 5/10/2021 to 6/9/2021.        Condition on Discharge:  stable    Vital Signs  Temp:  [97 °F (36.1 °C)-97.3 °F (36.3 °C)] 97 °F (36.1 °C)  Heart Rate:  [] 101  Resp:  [16-25] 20  BP: (131-158)/(71-95) 131/71    Physical Exam:  Physical Exam  Vitals reviewed.   Constitutional:       General: He is not in acute distress.     Appearance: He is obese. He is ill-appearing.   HENT:      Head: Normocephalic and atraumatic.   Eyes:      General: No scleral icterus.     Extraocular Movements: Extraocular movements intact.      Pupils: Pupils are equal, round, and reactive to light.   Cardiovascular:      Rate and Rhythm: Normal rate and regular rhythm.   Pulmonary:      Effort: No respiratory distress.      Breath sounds: Normal breath sounds. No stridor. No wheezing, rhonchi or rales.   Abdominal:      General: There is no distension.      Palpations: There is no mass.      Tenderness: There is no abdominal  tenderness. There is no right CVA tenderness, left CVA tenderness or guarding.   Musculoskeletal:         General: No swelling, tenderness or deformity. Normal range of motion.      Cervical back: Normal range of motion and neck supple. No rigidity or tenderness.      Right lower leg: No edema.      Left lower leg: No edema.   Skin:     General: Skin is warm and dry.      Coloration: Skin is not jaundiced or pale.      Findings: No bruising or lesion.   Neurological:      General: No focal deficit present.      Mental Status: He is alert and oriented to person, place, and time. Mental status is at baseline.      Cranial Nerves: No cranial nerve deficit.      Sensory: No sensory deficit.      Motor: No weakness.      Gait: Gait normal.   Psychiatric:         Mood and Affect: Mood normal.         Behavior: Behavior normal.         Discharge Disposition  Home or Self Care    Discharge Medications     Discharge Medications      New Medications      Instructions Start Date   ipratropium-albuterol 0.5-2.5 mg/3 ml nebulizer  Commonly known as: DUO-NEB   3 mL, Nebulization, 4 Times Daily - RT         Continue These Medications      Instructions Start Date   albuterol sulfate  (90 Base) MCG/ACT inhaler  Commonly known as: PROVENTIL HFA;VENTOLIN HFA;PROAIR HFA   2 puffs, Inhalation, Every 4 Hours PRN      amLODIPine 5 MG tablet  Commonly known as: NORVASC   5 mg, Oral, Every 24 Hours Scheduled      atorvastatin 80 MG tablet  Commonly known as: LIPITOR   80 mg, Oral, Nightly      dilTIAZem  MG 24 hr capsule  Commonly known as: Cardizem CD   120 mg, Oral, Daily      fenofibrate micronized 134 MG capsule  Commonly known as: LOFIBRA   134 mg, Oral, Every Morning Before Breakfast      gabapentin 300 MG capsule  Commonly known as: NEURONTIN   600 mg, Oral, 3 Times Daily      glucose blood test strip   Use as instructed      glucose monitor monitoring kit   1 each, Does not apply, As Needed      insulin glargine 100  "UNIT/ML injection  Commonly known as: LANTUS, SEMGLEE   60 Units, Subcutaneous, Nightly      insulin lispro 100 UNIT/ML injection  Commonly known as: HumaLOG   25 Units, Subcutaneous, 3 Times Daily Before Meals      isosorbide mononitrate 120 MG 24 hr tablet  Commonly known as: IMDUR   120 mg, Oral, Every Morning      lisinopril 40 MG tablet  Commonly known as: PRINIVIL,ZESTRIL   40 mg, Oral, Nightly      methocarbamol 500 MG tablet  Commonly known as: Robaxin   500 mg, Oral, 4 Times Daily      methylPREDNISolone 4 MG dose pack  Commonly known as: MEDROL   Take as directed on package instructions.      metoprolol succinate XL 50 MG 24 hr tablet  Commonly known as: TOPROL-XL   50 mg, Oral, Daily      Microlet Lancets misc   One touch delica lancets      nitroglycerin 0.4 MG SL tablet  Commonly known as: NITROSTAT   0.4 mg, Sublingual, Every 5 Minutes PRN      pantoprazole 40 MG EC tablet  Commonly known as: PROTONIX   40 mg, Oral, Nightly      pramipexole 1 MG tablet  Commonly known as: MIRAPEX   2 mg, Oral, Nightly      prasugrel 10 MG tablet  Commonly known as: EFFIENT   10 mg, Oral, Daily      ranolazine 1000 MG 12 hr tablet  Commonly known as: RANEXA   1,000 mg, Oral, Every 12 Hours Scheduled      ReliOn Insulin Syringe 31G X 15/64\" 0.5 ML misc  Generic drug: Insulin Syringe-Needle U-100   For use with insulin      Repatha SureClick solution auto-injector SureClick injection  Generic drug: Evolocumab   140 mg, Subcutaneous, Every 14 Days      SITagliptin 100 MG tablet  Commonly known as: Januvia   100 mg, Oral, Daily      traZODone 300 MG tablet  Commonly known as: DESYREL   300 mg, Oral, Every Night at Bedtime      warfarin 7.5 MG tablet  Commonly known as: Coumadin   Take 1 tablet nightly or as directed by [facility]madin Clinic         Stop These Medications    azithromycin 250 MG tablet  Commonly known as: Zithromax Z-Luca            Discharge Diet:   Diet Instructions     Diet: Consistent Carbohydrate      Discharge " Diet: Consistent Carbohydrate          Activity at Discharge:   Activity Instructions     Activity as Tolerated            Follow-up Appointments  Future Appointments   Date Time Provider Department Center   7/6/2021  9:00 AM Mami Perez APRN [facility] POW [facility]   7/8/2021 10:15 AM NURSE  MAD BH MAD OPI [facility]   7/8/2021 10:45 AM Karson Ibanez MD [facility] ONC [facility]     Additional Instructions for the Follow-ups that You Need to Schedule     Discharge Follow-up with PCP   As directed       Currently Documented PCP:    Mami Perez APRN    PCP Phone Number:    [phone]     Follow Up Details: in 1 week               Test Results Pending at Discharge  Pending Labs     Order Current Status    Blood Culture - Blood, Arm, Left Preliminary result           Betty Griffiths MD  06/12/21  14:12 CDT          Electronically signed by Betty Griffiths MD at 06/12/21 1418       Discharge Order (From admission, onward)     Start     Ordered    06/12/21 0959  Discharge patient  Once     Expected Discharge Date: 06/12/21    Discharge Disposition: Home or Self Care    Physician of Record for Attribution - Please select from Treatment Team: BETTY GRIFFITHS [[id number]]    Review needed by CMO to determine Physician of Record: No       Question Answer Comment   Physician of Record for Attribution - Please select from Treatment Team BETTY GRIFFITHS    Review needed by CMO to determine Physician of Record No        06/12/21 1002

## 2021-06-14 NOTE — OUTREACH NOTE
Call Center TCM Note      Responses   [facility] patient discharged from?  [place]   Does the patient have one of the following disease processes/diagnoses(primary or secondary)?  COPD/Pneumonia   Was the primary reason for admission:  Pneumonia   TCM attempt successful?  No [Hanna-dad and Makenna-sister]   Unsuccessful attempts  Attempt 1 [Hanna was unable to give update on patient. ]   Has all DME been delivered?  No          Merlene Dias RN    6/14/2021, 14:23 EDT

## 2021-06-14 NOTE — OUTREACH NOTE
Call Center TCM Note      Responses   [facility] patient discharged from?  [place]   Does the patient have one of the following disease processes/diagnoses(primary or secondary)?  COPD/Pneumonia   Was the primary reason for admission:  Pneumonia   TCM attempt successful?  Yes   Call start time  1507   Call end time  1512   Discharge diagnosis  Right lower lobe pneumonia   Meds reviewed with patient/caregiver?  Yes   Is the patient having any side effects they believe may be caused by any medication additions or changes?  No   Does the patient have all medications ordered at discharge?  Yes   Is the patient taking all medications as directed (includes completed medication regime)?  Yes   Does the patient have a primary care provider?   Yes   Does the patient have an appointment with their PCP or specialist within 7 days of discharge?  Yes   Comments regarding PCP  Hospital d/c f/u appt is on 6/17/21 at 1:00 pm    Has the patient kept scheduled appointments due by today?  N/A   What DME was ordered?  [facility] he will call Monday to see if he can get a new nebulizer   DME interventions  Other [Advised pt to call and see if he can get a new nebulizer]   Pulse Ox monitoring  Intermittent   O2 Sat comments  Not checked today   Psychosocial issues?  No   Did the patient receive a copy of their discharge instructions?  Yes   Nursing interventions  Reviewed instructions with patient   What is the patient's perception of their health status since discharge?  Same   Nursing Interventions  Nurse provided patient education   Are the patient's immunizations up to date?   Yes   If the patient is a current smoker, are they able to teach back resources for cessation?  Not a smoker [Patient has smoked since he was a teenager. ]   Is the patient/caregiver able to teach back the hierarchy of who to call/visit for symptoms/problems? PCP, Specialist, Home health nurse, Urgent Care, ED, 911  Yes   Is the  patient/caregiver able to teach back signs and symptoms of worsening condition:  Fever/chills, Shortness of breath, Chest pain   Is the patient/caregiver able to teach back importance of completing antibiotic course of treatment?  -- [Not sent home with ABX ]   TCM call completed?  Yes          Merlene Dias RN    6/14/2021, 15:12 EDT

## 2021-06-17 ENCOUNTER — OFFICE VISIT (OUTPATIENT)
Dept: FAMILY MEDICINE CLINIC | Facility: CLINIC | Age: 41
End: 2021-06-17

## 2021-06-17 VITALS
SYSTOLIC BLOOD PRESSURE: 148 MMHG | HEIGHT: 71 IN | OXYGEN SATURATION: 96 % | DIASTOLIC BLOOD PRESSURE: 86 MMHG | HEART RATE: 71 BPM | BODY MASS INDEX: 44.1 KG/M2 | WEIGHT: 315 LBS

## 2021-06-17 DIAGNOSIS — IMO0002 DM TYPE 2, UNCONTROLLED, WITH NEUROPATHY: ICD-10-CM

## 2021-06-17 DIAGNOSIS — Z09 HOSPITAL DISCHARGE FOLLOW-UP: Primary | ICD-10-CM

## 2021-06-17 DIAGNOSIS — J18.9 PNEUMONIA OF RIGHT LOWER LOBE DUE TO INFECTIOUS ORGANISM: ICD-10-CM

## 2021-06-17 LAB
QT INTERVAL: 318 MS
QTC INTERVAL: 442 MS

## 2021-06-17 PROCEDURE — 99213 OFFICE O/P EST LOW 20 MIN: CPT | Performed by: NURSE PRACTITIONER

## 2021-06-17 NOTE — PROGRESS NOTES
"CC: Hospital Follow Up Visit (having trouble breathing )      Subjective:  Yordy Hansen is a 40 y.o. male who presents for     Pt in for Hospital follow up. Was admitted to [facility] on 6/8/21 for Cough and Shortness of breath. Pt found to have RLL pneumonia. Was treated with IV antibiotics and neb treatments while in hospital. Labs normalized and pt felt better so was discharged to home on 6/12/21. Pt was d/c'd home with neb treatments and states this is helping some. States \"Last night, I could feel the gurgling again in my chest.\" On the right side. Denies associated pain, fever,  or SOB. Does have associated cough productive of milky phlegm.    Pt states he needs a referral to Endocrinologist put in. This was done in the past, but states he can't see without a referral.      The following portions of the patient's history were reviewed and updated as appropriate: allergies, current medications, past family history, past medical history, past social history, past surgical history and problem list.    Past Medical History:   Diagnosis Date   • Asthma    • CAD (coronary artery disease)    • Chronic back pain    • Chronic pulmonary embolism (CMS/HCC)    • Coronary artery disease    • Diabetes mellitus (CMS/HCC)    • Factor II deficiency (CMS/HCC)    • Fatty liver    • GERD (gastroesophageal reflux disease)    • Hyperlipidemia    • Hypertension    • Morbid obesity (CMS/HCC)    • RLS (restless legs syndrome)    • Seizures (CMS/HCC)    • Sleep apnea          Current Outpatient Medications:   •  albuterol (PROVENTIL HFA;VENTOLIN HFA) 108 (90 BASE) MCG/ACT inhaler, Inhale 2 puffs Every 4 (Four) Hours As Needed for Wheezing., Disp: , Rfl:   •  amLODIPine (NORVASC) 5 MG tablet, Take 1 tablet by mouth Daily., Disp: 30 tablet, Rfl: 0  •  atorvastatin (LIPITOR) 80 MG tablet, Take 1 tablet by mouth Every Night., Disp: 90 tablet, Rfl: 3  •  dilTIAZem CD (Cardizem CD) 120 MG 24 hr capsule, Take 1 capsule " by mouth Daily., Disp: 90 capsule, Rfl: 0  •  Evolocumab (Repatha SureClick) solution auto-injector SureClick injection, Inject 1 mL under the skin into the appropriate area as directed Every 14 (Fourteen) Days., Disp: 2 pen, Rfl: 5  •  fenofibrate micronized (LOFIBRA) 134 MG capsule, Take 1 capsule by mouth Every Morning Before Breakfast., Disp: 90 capsule, Rfl: 3  •  gabapentin (NEURONTIN) 300 MG capsule, Take 2 capsules by mouth 3 (Three) Times a Day., Disp: 180 capsule, Rfl: 0  •  glucose blood test strip, Use as instructed, Disp: 100 each, Rfl: 12  •  glucose monitor monitoring kit, 1 each As Needed (check blood glucose 3x daily)., Disp: 1 each, Rfl: 3  •  insulin glargine (LANTUS) 100 UNIT/ML injection, Inject 60 Units under the skin into the appropriate area as directed Every Night., Disp: 50 mL, Rfl: 3  •  insulin lispro (HumaLOG) 100 UNIT/ML injection, Inject 25 Units under the skin into the appropriate area as directed 3 (Three) Times a Day Before Meals., Disp: , Rfl:   •  ipratropium-albuterol (DUO-NEB) 0.5-2.5 mg/3 ml nebulizer, Take 3 mL by nebulization 4 (Four) Times a Day., Disp: 360 mL, Rfl: 1  •  isosorbide mononitrate (IMDUR) 120 MG 24 hr tablet, Take 1 tablet by mouth Every Morning for 30 days., Disp: 30 tablet, Rfl: 0  •  lisinopril (PRINIVIL,ZESTRIL) 40 MG tablet, Take 1 tablet by mouth Every Night., Disp: 90 tablet, Rfl: 3  •  methocarbamol (Robaxin) 500 MG tablet, Take 1 tablet by mouth 4 (Four) Times a Day., Disp: 56 tablet, Rfl: 3  •  methylPREDNISolone (MEDROL) 4 MG dose pack, Take as directed on package instructions., Disp: 21 tablet, Rfl: 0  •  metoprolol succinate XL (TOPROL-XL) 50 MG 24 hr tablet, Take 1 tablet by mouth Daily., Disp: 90 tablet, Rfl: 3  •  Microlet Lancets misc, One touch delica lancets, Disp: 100 each, Rfl: 5  •  nitroglycerin (NITROSTAT) 0.4 MG SL tablet, Place 1 tablet under the tongue Every 5 (Five) Minutes As Needed for Chest Pain for up to 15 days., Disp: 25  "tablet, Rfl: 6  •  pantoprazole (PROTONIX) 40 MG EC tablet, Take 1 tablet by mouth Every Night., Disp: 90 tablet, Rfl: 3  •  pramipexole (MIRAPEX) 1 MG tablet, Take 2 tablets by mouth Every Night., Disp: 180 tablet, Rfl: 3  •  prasugrel (EFFIENT) 10 MG tablet, Take 1 tablet by mouth Daily., Disp: 90 tablet, Rfl: 3  •  ranolazine (RANEXA) 1000 MG 12 hr tablet, Take 1 tablet by mouth Every 12 (Twelve) Hours., Disp: 60 tablet, Rfl: 5  •  ReliOn Insulin Syringe 31G X 15/64\" 0.5 ML misc, For use with insulin, Disp: 100 each, Rfl: 5  •  SITagliptin (Januvia) 100 MG tablet, Take 1 tablet by mouth Daily., Disp: 30 tablet, Rfl: 5  •  traZODone (DESYREL) 300 MG tablet, Take 1 tablet by mouth every night at bedtime., Disp: 90 tablet, Rfl: 3  •  warfarin (Coumadin) 7.5 MG tablet, Take 1 tablet nightly or as directed by [facility], Disp: 30 tablet, Rfl: 0    Review of Systems    Review of Systems   Constitutional: Negative.    HENT: Negative.    Eyes: Negative.    Respiratory: Positive for cough. Negative for shortness of breath.    Cardiovascular: Negative.    Gastrointestinal: Negative.    Endocrine: Negative.    Genitourinary: Negative.    Musculoskeletal: Negative.    Skin: Negative.    Allergic/Immunologic: Negative.    Neurological: Negative.    Hematological: Negative.    Psychiatric/Behavioral: Negative.    All other systems reviewed and are negative.      Objective  Vitals:    06/17/21 1303   BP: 148/86   Pulse: 71   SpO2: 96%   Weight: (!) 196 kg (432 lb)   Height: 180.3 cm (71\")     Body mass index is 60.25 kg/m².    Physical Exam    Physical Exam  Vitals and nursing note reviewed.   Constitutional:       General: He is not in acute distress.     Appearance: Normal appearance. He is well-developed. He is obese. He is not ill-appearing, toxic-appearing or diaphoretic.   HENT:      Head: Normocephalic and atraumatic.      Right Ear: External ear normal.      Left Ear: External ear normal.   Eyes:      General: No " scleral icterus.        Right eye: No discharge.         Left eye: No discharge.      Extraocular Movements: Extraocular movements intact.      Conjunctiva/sclera: Conjunctivae normal.   Cardiovascular:      Rate and Rhythm: Normal rate and regular rhythm.      Pulses: Normal pulses.      Heart sounds: Normal heart sounds. No murmur heard.   No friction rub. No gallop.    Pulmonary:      Effort: Pulmonary effort is normal. No respiratory distress.      Breath sounds: Normal breath sounds. No stridor. No wheezing, rhonchi or rales.   Chest:      Chest wall: No tenderness.   Abdominal:      General: Bowel sounds are normal. There is no distension.      Palpations: Abdomen is soft. There is no mass.      Tenderness: There is no abdominal tenderness. There is no guarding or rebound.      Hernia: No hernia is present.   Musculoskeletal:      Cervical back: Normal range of motion and neck supple.      Right lower leg: No edema.      Left lower leg: No edema.   Skin:     General: Skin is warm and dry.      Capillary Refill: Capillary refill takes less than 2 seconds.      Findings: No rash.   Neurological:      Mental Status: He is alert and oriented to person, place, and time.   Psychiatric:         Mood and Affect: Mood normal.         Behavior: Behavior normal.         Thought Content: Thought content normal.         Judgment: Judgment normal.             Diagnoses and all orders for this visit:    1. Hospital discharge follow-up (Primary)    2. Pneumonia of right lower lobe due to infectious organism  Comments:  Improving    3. DM type 2, uncontrolled, with neuropathy (CMS/HCC)  -     Ambulatory Referral to Endocrinology       Patient advised to continue the neb treatments that he was given upon hospital discharge.  Lungs are clear, pneumonia appears to be resolving.  Referral made to endocrinology again.  Hopefully, patient can get into see the endocrinologist now.  Patient has a diabetes mellitus follow-up with me on  July 6, 2021.  Advised to keep this appointment.  Encouraged patient to take in a low calorie low carbohydrate diet. To follow-up sooner if needed.  Answered all questions.  Patient verbalized understanding of plan of care.        This document has been electronically signed by LALA Munoz on June 17, 2021 13:18 CDT

## 2021-06-17 NOTE — PATIENT INSTRUCTIONS
Community-Acquired Pneumonia, Adult  Pneumonia is an infection of the lungs. It causes irritation and swelling in the airways of the lungs. Mucus and fluid may also build up inside the airways. This may cause coughing and trouble breathing.  One type of pneumonia can happen while you are in a hospital. A different type can happen when you are not in a hospital (community-acquired pneumonia).  What are the causes?    This condition is caused by germs (viruses, bacteria, or fungi). Some types of germs can spread from person to person. Pneumonia is not thought to spread from person to person.  What increases the risk?  You are more likely to develop this condition if:  · You have a long-term (chronic) disease, such as:  ? Disease of the lungs. This may be chronic obstructive pulmonary disease (COPD) or asthma.  ? Heart failure.  ? Cystic fibrosis.  ? Diabetes.  ? Kidney disease.  ? Sickle cell disease.  ? HIV.  · You have other health problems, such as:  ? Your body's defense system (immune system) is weak.  ? A condition that may cause you to breathe in fluids from your mouth and nose.  · You had your spleen taken out.  · You do not take good care of your teeth and mouth (poor dental hygiene).  · You use or have used tobacco products.  · You travel where the germs that cause this illness are common.  · You are near certain animals or the places they live.  · You are older than 65 years of age.  What are the signs or symptoms?  Symptoms of this condition include:  · A cough.  · A fever.  · Sweating or chills.  · Chest pain, often when you breathe deeply or cough.  · Breathing problems, such as:  ? Fast breathing.  ? Trouble breathing.  ? Shortness of breath.  · Feeling tired (fatigued).  · Muscle aches.  How is this treated?  Treatment for this condition depends on many things, such as:  · The cause of your illness.  · Your medicines.  · Your other health problems.  Most adults can be treated at home. Sometimes,  treatment must happen in a hospital.  · Treatment may include medicines to kill germs.  · Medicines may depend on which germ caused your illness.  Very bad pneumonia is rare. If you get it, you may:  · Have a machine to help you breathe.  · Have fluid taken away from around your lungs.  Follow these instructions at home:  Medicines  · Take over-the-counter and prescription medicines only as told by your doctor.  · Take cough medicine only if you are losing sleep. Cough medicine can keep your body from taking mucus away from your lungs.  · If you were prescribed an antibiotic medicine, take it as told by your doctor. Do not stop taking the antibiotic even if you start to feel better.  Lifestyle         · Do not drink alcohol.  · Do not use any products that contain nicotine or tobacco, such as cigarettes, e-cigarettes, and chewing tobacco. If you need help quitting, ask your doctor.  · Eat a healthy diet. This includes a lot of vegetables, fruits, whole grains, low-fat dairy products, and low-fat (lean) protein.  General instructions    · Rest a lot. Sleep for at least 8 hours each night.  · Sleep with your head and neck raised. Put a few pillows under your head or sleep in a reclining chair.  · Return to your normal activities as told by your doctor. Ask your doctor what activities are safe for you.  · Drink enough fluid to keep your pee (urine) pale yellow.  · If your throat is sore, rinse your mouth often with salt water. To make salt water, dissolve ½-1 tsp (3-6 g) of salt in 1 cup (237 mL) of warm water.  · Keep all follow-up visits as told by your doctor. This is important.  How is this prevented?  You can lower your risk of pneumonia by:  · Getting the pneumonia shot (vaccine). These shots have different types and schedules. Ask your doctor what works best for you. Think about getting this shot if:  ? You are older than 65 years of age.  ? You are 19-65 years of age and:  § You are being treated for  cancer.  § You have long-term lung disease.  § You have other problems that affect your body's defense system. Ask your doctor if you have one of these.  · Getting your flu shot every year. Ask your doctor which type of shot is best for you.  · Going to the dentist as often as told.  · Washing your hands often with soap and water for at least 20 seconds. If you cannot use soap and water, use hand .  Contact a doctor if:  · You have a fever.  · You lose sleep because your cough medicine does not help.  Get help right away if:  · You are short of breath and this gets worse.  · You have more chest pain.  · Your sickness gets worse. This is very serious if:  ? You are an older adult.  ? Your body's defense system is weak.  · You cough up blood.  These symptoms may be an emergency. Do not wait to see if the symptoms will go away. Get medical help right away. Call your local emergency services (911 in the [place] Do not drive yourself to the hospital.  Summary  · Pneumonia is an infection of the lungs.  · Community-acquired pneumonia affects people who have not been in the hospital. Certain germs can cause this infection.  · This condition may be treated with medicines that kill germs.  · For very bad pneumonia, you may need a hospital stay and treatment to help with breathing.  This information is not intended to replace advice given to you by your health care provider. Make sure you discuss any questions you have with your health care provider.  Document Revised: 09/29/2020 Document Reviewed: 09/29/2020  [employer] Patient Education © 2021 [employer]

## 2021-06-18 LAB
QT INTERVAL: 394 MS
QTC INTERVAL: 434 MS

## 2021-06-22 ENCOUNTER — READMISSION MANAGEMENT (OUTPATIENT)
Dept: CALL CENTER | Facility: HOSPITAL | Age: 41
End: 2021-06-22

## 2021-06-22 NOTE — OUTREACH NOTE
COPD/PN Week 2 Survey      Responses   [facility] patient discharged from?  [place]   Does the patient have one of the following disease processes/diagnoses(primary or secondary)?  COPD/Pneumonia   Was the primary reason for admission:  Pneumonia   Week 2 attempt successful?  No   Unsuccessful attempts  Attempt 1   Discharge diagnosis  Right lower lobe pneumonia          Merlene Dias, RN

## 2021-06-24 ENCOUNTER — READMISSION MANAGEMENT (OUTPATIENT)
Dept: CALL CENTER | Facility: HOSPITAL | Age: 41
End: 2021-06-24

## 2021-06-24 NOTE — OUTREACH NOTE
COPD/PN Week 2 Survey      Responses   [facility] patient discharged from?  [place]   Does the patient have one of the following disease processes/diagnoses(primary or secondary)?  COPD/Pneumonia   Was the primary reason for admission:  Pneumonia   Week 2 attempt successful?  Yes   Call start time  1522   Call end time  1523   Discharge diagnosis  Right lower lobe pneumonia   Meds reviewed with patient/caregiver?  Yes   Is the patient taking all medications as directed (includes completed medication regime)?  Yes   Has the patient kept scheduled appointments due by today?  Yes   DME interventions  -- [Pt still has not called to get nebulizer]   Pulse Ox monitoring  Intermittent   O2 Sat comments  Not checked today   What is the patient's perception of their health status since discharge?  Improving   Week 2 call completed?  Yes          Merlene Dias RN

## 2021-07-01 ENCOUNTER — READMISSION MANAGEMENT (OUTPATIENT)
Dept: CALL CENTER | Facility: HOSPITAL | Age: 41
End: 2021-07-01

## 2021-07-01 NOTE — OUTREACH NOTE
COPD/PN Week 3 Survey      Responses   [facility] patient discharged from?  [place]   Does the patient have one of the following disease processes/diagnoses(primary or secondary)?  COPD/Pneumonia   Was the primary reason for admission:  Pneumonia   Week 3 attempt successful?  No   Unsuccessful attempts  Attempt 1          Ifeoma Gurrola RN

## 2021-07-02 ENCOUNTER — READMISSION MANAGEMENT (OUTPATIENT)
Dept: CALL CENTER | Facility: HOSPITAL | Age: 41
End: 2021-07-02

## 2021-07-02 NOTE — OUTREACH NOTE
COPD/PN Week 3 Survey      Responses   [facility] patient discharged from?  [place]   Does the patient have one of the following disease processes/diagnoses(primary or secondary)?  COPD/Pneumonia   Was the primary reason for admission:  Pneumonia   Week 3 attempt successful?  Yes   Call start time  1057   Call end time  1057   Discharge diagnosis  Right lower lobe pneumonia   Is the patient taking all medications as directed (includes completed medication regime)?  Yes   Has the patient kept scheduled appointments due by today?  Yes   Psychosocial issues?  No   What is the patient's perception of their health status since discharge?  Improving   Is the patient/caregiver able to teach back the hierarchy of who to call/visit for symptoms/problems? PCP, Specialist, Home health nurse, Urgent Care, ED, 911  Yes   Is the patient/caregiver able to teach back signs and symptoms of worsening condition:  Fever/chills, Shortness of breath, Chest pain   Week 3 call completed?  Yes   Revoked  No further contact(revokes)-requires comment   Is the patient interested in additional calls from an ambulatory ?  NOTE:  applies to high risk patients requiring additional follow-up.  No   Graduated/Revoked comments  Goals met, no further calls needed.          Claudine Pedraza RN

## 2021-07-06 ENCOUNTER — OFFICE VISIT (OUTPATIENT)
Dept: FAMILY MEDICINE CLINIC | Facility: CLINIC | Age: 41
End: 2021-07-06

## 2021-07-06 VITALS
HEART RATE: 85 BPM | DIASTOLIC BLOOD PRESSURE: 80 MMHG | WEIGHT: 315 LBS | BODY MASS INDEX: 44.1 KG/M2 | OXYGEN SATURATION: 96 % | HEIGHT: 71 IN | SYSTOLIC BLOOD PRESSURE: 130 MMHG | TEMPERATURE: 98.4 F

## 2021-07-06 DIAGNOSIS — I10 ESSENTIAL HYPERTENSION: ICD-10-CM

## 2021-07-06 DIAGNOSIS — E11.40 TYPE 2 DIABETES MELLITUS WITH DIABETIC NEUROPATHY, WITH LONG-TERM CURRENT USE OF INSULIN (HCC): Chronic | ICD-10-CM

## 2021-07-06 DIAGNOSIS — Z00.00 ANNUAL PHYSICAL EXAM: Primary | ICD-10-CM

## 2021-07-06 DIAGNOSIS — Z79.4 TYPE 2 DIABETES MELLITUS WITH DIABETIC NEUROPATHY, WITH LONG-TERM CURRENT USE OF INSULIN (HCC): Chronic | ICD-10-CM

## 2021-07-06 DIAGNOSIS — E78.5 HYPERLIPIDEMIA, UNSPECIFIED HYPERLIPIDEMIA TYPE: ICD-10-CM

## 2021-07-06 DIAGNOSIS — E66.01 MORBID OBESITY (HCC): Chronic | ICD-10-CM

## 2021-07-06 DIAGNOSIS — I27.82 OTHER CHRONIC PULMONARY EMBOLISM WITHOUT ACUTE COR PULMONALE (HCC): Chronic | ICD-10-CM

## 2021-07-06 DIAGNOSIS — Z23 NEED FOR VACCINATION: ICD-10-CM

## 2021-07-06 DIAGNOSIS — I25.118 CORONARY ARTERY DISEASE OF NATIVE ARTERY OF NATIVE HEART WITH STABLE ANGINA PECTORIS (HCC): ICD-10-CM

## 2021-07-06 DIAGNOSIS — J45.20 MILD INTERMITTENT ASTHMA WITHOUT COMPLICATION: ICD-10-CM

## 2021-07-06 DIAGNOSIS — E11.9 ENCOUNTER FOR DIABETIC FOOT EXAM (HCC): ICD-10-CM

## 2021-07-06 PROCEDURE — 90746 HEPB VACCINE 3 DOSE ADULT IM: CPT | Performed by: NURSE PRACTITIONER

## 2021-07-06 PROCEDURE — G0010 ADMIN HEPATITIS B VACCINE: HCPCS | Performed by: NURSE PRACTITIONER

## 2021-07-06 PROCEDURE — 99396 PREV VISIT EST AGE 40-64: CPT | Performed by: NURSE PRACTITIONER

## 2021-07-06 PROCEDURE — 90732 PPSV23 VACC 2 YRS+ SUBQ/IM: CPT | Performed by: NURSE PRACTITIONER

## 2021-07-06 PROCEDURE — G0009 ADMIN PNEUMOCOCCAL VACCINE: HCPCS | Performed by: NURSE PRACTITIONER

## 2021-07-06 RX ORDER — DULAGLUTIDE 0.75 MG/.5ML
0.75 INJECTION, SOLUTION SUBCUTANEOUS WEEKLY
Qty: 4 PEN | Refills: 3 | Status: SHIPPED | OUTPATIENT
Start: 2021-07-06 | End: 2021-09-30 | Stop reason: DRUGHIGH

## 2021-07-06 RX ORDER — GABAPENTIN 300 MG/1
600 CAPSULE ORAL 3 TIMES DAILY
Qty: 180 CAPSULE | Refills: 0 | Status: SHIPPED | OUTPATIENT
Start: 2021-07-06 | End: 2021-08-03 | Stop reason: SDUPTHER

## 2021-07-06 NOTE — PROGRESS NOTES
CC: Diabetes (3 month follow up)      Subjective:  Yordy Hansen is a 40 y.o. male who presents for     Patient is here for routine checkup with labs.  Has diabetes mellitus with neuropathy he takes Lantus 70 units QHS, Humalog 35 units with meals, and Januvia for. States checks his sugars twice daily. States is still high. Is always in the 200s. Denies low spells with a high of 350. Does have an appointment with Endocrinology in October 2021. Had eye exam done within the last year.     He is morbidly obese.  He has hypertension and coronary artery disease he takes diltiazem, Imdur, lisinopril, metoprolol, sublingual nitroglycerin, Effient, and Ranexa for.  He has hyperlipidemia which he is taking Lipitor and fenofibrate for.  We did attempt to get him Repatha, but is unable to get because of insurance right now.  He has GERD which is currently not on medication for.  Has chronic pulmonary embolism which he is supposed to be on Coumadin for. [facility] hasn't been taking this, Has appointment with Coumadin clinic on 7/8/21. [facility] has continued the Xarelto for now.  He has asthma which he takes albuterol inhaler as needed.      The following portions of the patient's history were reviewed and updated as appropriate: allergies, current medications, past family history, past medical history, past social history, past surgical history and problem list.    Past Medical History:   Diagnosis Date   • Asthma    • CAD (coronary artery disease)    • Chronic back pain    • Chronic pulmonary embolism (CMS/HCC)    • Coronary artery disease    • Diabetes mellitus (CMS/HCC)    • Factor II deficiency (CMS/HCC)    • Fatty liver    • GERD (gastroesophageal reflux disease)    • Hyperlipidemia    • Hypertension    • Morbid obesity (CMS/HCC)    • RLS (restless legs syndrome)    • Seizures (CMS/[facility])    • Sleep apnea          Current Outpatient Medications:   •  albuterol (PROVENTIL HFA;VENTOLIN HFA) 108 (90 BASE) MCG/ACT inhaler, Inhale  2 puffs Every 4 (Four) Hours As Needed for Wheezing., Disp: , Rfl:   •  amLODIPine (NORVASC) 5 MG tablet, Take 1 tablet by mouth Daily., Disp: 30 tablet, Rfl: 0  •  atorvastatin (LIPITOR) 80 MG tablet, Take 1 tablet by mouth Every Night., Disp: 90 tablet, Rfl: 3  •  dilTIAZem CD (Cardizem CD) 120 MG 24 hr capsule, Take 1 capsule by mouth Daily., Disp: 90 capsule, Rfl: 0  •  Evolocumab (Repatha SureClick) solution auto-injector SureClick injection, Inject 1 mL under the skin into the appropriate area as directed Every 14 (Fourteen) Days., Disp: 2 pen, Rfl: 5  •  fenofibrate micronized (LOFIBRA) 134 MG capsule, Take 1 capsule by mouth Every Morning Before Breakfast., Disp: 90 capsule, Rfl: 3  •  gabapentin (NEURONTIN) 300 MG capsule, Take 2 capsules by mouth 3 (Three) Times a Day., Disp: 180 capsule, Rfl: 0  •  glucose blood test strip, Use as instructed, Disp: 100 each, Rfl: 12  •  glucose monitor monitoring kit, 1 each As Needed (check blood glucose 3x daily)., Disp: 1 each, Rfl: 3  •  insulin glargine (LANTUS) 100 UNIT/ML injection, Inject 60 Units under the skin into the appropriate area as directed Every Night., Disp: 50 mL, Rfl: 3  •  insulin lispro (HumaLOG) 100 UNIT/ML injection, Inject 25 Units under the skin into the appropriate area as directed 3 (Three) Times a Day Before Meals., Disp: , Rfl:   •  ipratropium-albuterol (DUO-NEB) 0.5-2.5 mg/3 ml nebulizer, Take 3 mL by nebulization 4 (Four) Times a Day., Disp: 360 mL, Rfl: 1  •  isosorbide mononitrate (IMDUR) 120 MG 24 hr tablet, Take 1 tablet by mouth Every Morning for 30 days., Disp: 30 tablet, Rfl: 0  •  lisinopril (PRINIVIL,ZESTRIL) 40 MG tablet, Take 1 tablet by mouth Every Night., Disp: 90 tablet, Rfl: 3  •  methocarbamol (Robaxin) 500 MG tablet, Take 1 tablet by mouth 4 (Four) Times a Day., Disp: 56 tablet, Rfl: 3  •  metoprolol succinate XL (TOPROL-XL) 50 MG 24 hr tablet, Take 1 tablet by mouth Daily., Disp: 90 tablet, Rfl: 3  •  Microlet Lancets  "misc, One touch delica lancets, Disp: 100 each, Rfl: 5  •  nitroglycerin (NITROSTAT) 0.4 MG SL tablet, Place 1 tablet under the tongue Every 5 (Five) Minutes As Needed for Chest Pain for up to 15 days., Disp: 25 tablet, Rfl: 6  •  pantoprazole (PROTONIX) 40 MG EC tablet, Take 1 tablet by mouth Every Night., Disp: 90 tablet, Rfl: 3  •  pramipexole (MIRAPEX) 1 MG tablet, Take 2 tablets by mouth Every Night., Disp: 180 tablet, Rfl: 3  •  prasugrel (EFFIENT) 10 MG tablet, Take 1 tablet by mouth Daily., Disp: 90 tablet, Rfl: 3  •  ranolazine (RANEXA) 1000 MG 12 hr tablet, Take 1 tablet by mouth Every 12 (Twelve) Hours., Disp: 60 tablet, Rfl: 5  •  ReliOn Insulin Syringe 31G X 15/64\" 0.5 ML misc, For use with insulin, Disp: 100 each, Rfl: 5  •  SITagliptin (Januvia) 100 MG tablet, Take 1 tablet by mouth Daily., Disp: 30 tablet, Rfl: 5  •  traZODone (DESYREL) 300 MG tablet, Take 1 tablet by mouth every night at bedtime., Disp: 90 tablet, Rfl: 3  •  warfarin (Coumadin) 7.5 MG tablet, Take 1 tablet nightly or as directed by Coumadin Clinic, Disp: 30 tablet, Rfl: 0  •  Dulaglutide (Trulicity) 0.75 MG/0.5ML solution pen-injector, Inject 0.75 mg under the skin into the appropriate area as directed 1 (One) Time Per Week., Disp: 4 pen, Rfl: 3  No current facility-administered medications for this visit.    Review of Systems    Review of Systems   Constitutional: Negative.    HENT: Negative.    Eyes: Negative.    Respiratory: Negative.    Cardiovascular: Negative.    Gastrointestinal: Negative.    Endocrine: Negative.    Genitourinary: Negative.    Musculoskeletal: Positive for back pain and myalgias.   Skin: Negative.    Allergic/Immunologic: Negative.    Neurological: Positive for numbness.   Hematological: Negative.    Psychiatric/Behavioral: Negative.    All other systems reviewed and are negative.      Objective  Vitals:    07/06/21 0843   BP: 130/80   BP Location: Left arm   Patient Position: Sitting   Cuff Size: Large Adult " "  Pulse: 85   Temp: 98.4 °F (36.9 °C)   SpO2: 96%   Weight: (!) 195 kg (430 lb 9.6 oz)   Height: 180.3 cm (71\")     Body mass index is 60.06 kg/m².    Physical Exam    Physical Exam  Vitals and nursing note reviewed.   Constitutional:       General: He is not in acute distress.     Appearance: Normal appearance. He is well-developed. He is obese. He is not ill-appearing, toxic-appearing or diaphoretic.   HENT:      Head: Normocephalic and atraumatic.      Right Ear: External ear normal.      Left Ear: External ear normal.   Eyes:      General: No scleral icterus.        Right eye: No discharge.         Left eye: No discharge.      Extraocular Movements: Extraocular movements intact.      Conjunctiva/sclera: Conjunctivae normal.   Neck:      Vascular: No carotid bruit.   Cardiovascular:      Rate and Rhythm: Normal rate and regular rhythm.      Heart sounds: Normal heart sounds. No murmur heard.   No friction rub. No gallop.    Pulmonary:      Effort: Pulmonary effort is normal. No respiratory distress.      Breath sounds: Normal breath sounds. No stridor. No wheezing, rhonchi or rales.   Chest:      Chest wall: No tenderness.   Abdominal:      General: Bowel sounds are normal. There is no distension.      Palpations: Abdomen is soft. There is no mass.      Tenderness: There is no abdominal tenderness. There is no guarding or rebound.      Hernia: No hernia is present.   Musculoskeletal:      Cervical back: Normal range of motion and neck supple. No rigidity or tenderness.      Right lower leg: No edema.      Left lower leg: No edema.   Lymphadenopathy:      Cervical: No cervical adenopathy.   Skin:     General: Skin is warm and dry.      Capillary Refill: Capillary refill takes less than 2 seconds.      Coloration: Skin is not jaundiced or pale.      Findings: No bruising, erythema, lesion or rash.      Comments: Diabetic foot exam: Vibratory sensation impaired in the Rt foot. Intact in the left foot. Monofilament " sensation absent bilateral feet. Skin intact bilateral feet. No ulcerations or callous noted. Nails intact. There is a blood blister noted to the Lt great toe.    Neurological:      General: No focal deficit present.      Mental Status: He is alert and oriented to person, place, and time. Mental status is at baseline.   Psychiatric:         Behavior: Behavior normal.         Thought Content: Thought content normal.         Judgment: Judgment normal.             Diagnoses and all orders for this visit:    1. Annual physical exam (Primary)  -     CBC w AUTO Differential; Future  -     Comprehensive metabolic panel; Future  -     TSH  -     Lipid Panel With LDL/HDL Ratio; Future  -     Hemoglobin A1c    2. Type 2 diabetes mellitus with diabetic neuropathy, with long-term current use of insulin (CMS/[facility])  -     gabapentin (NEURONTIN) 300 MG capsule; Take 2 capsules by mouth 3 (Three) Times a Day.  Dispense: 180 capsule; Refill: 0  -     Hemoglobin A1c  -     Dulaglutide (Trulicity) 0.75 MG/0.5ML solution pen-injector; Inject 0.75 mg under the skin into the appropriate area as directed 1 (One) Time Per Week.  Dispense: 4 pen; Refill: 3    3. Morbid obesity (CMS/[facility])    4. Essential hypertension  -     Comprehensive metabolic panel; Future    5. Hyperlipidemia, unspecified hyperlipidemia type  -     Lipid Panel With LDL/HDL Ratio; Future    6. Coronary artery disease of native artery of native heart with stable angina pectoris (CMS/[facility])    7. Other chronic pulmonary embolism without acute cor pulmonale (CMS/[facility])    8. Mild intermittent asthma without complication    9. Encounter for diabetic foot exam (CMS/[facility])    10. Need for vaccination  -     pneumococcal polysaccharide 23-valent (PNEUMOVAX-23) vaccine 0.5 mL  -     Hepatitis B Vaccine Adult IM - Engerix       Annual physical exam performed today.  Patient will return fasting for routine labs.  These are CBC, CMP, TSH, lipid panel, and hemoglobin A1c.  Refilled  patient's Neurontin today.  Added Trulicity 0.75 mg weekly.  Patient instructed on how to use medication and possible side effects.  For the morbid obesity given handout on how to exercise to lose weight.  Patient does not need refills on other medications today.  Patient advised to keep his appointment with the Coumadin clinic and start Coumadin as instructed per them.  Advised to keep his appointment with Dr. Benitez in October 2021.  Patient given hepatitis B and pneumococcal vaccines while in office today.  Patient tolerated well without complication.  We will follow-up in 3 months or sooner if needed to follow-up on patient's diabetic neuropathy.    Patient understands the risks associated with this controlled medication, including tolerance and addiction.  he also agrees to only obtain this medication from me, and not from a another provider, unless that provider is covering for me in my absence.  he also agrees to be compliant in dosing, and not self adjust the dose of medication.  A signed controlled substance agreement is on file, and he has received a controlled substance education sheet at this a previous visit.  he has also signed a consent for treatment with a controlled substance as per [facility] policy. CARY was obtained.            This document has been electronically signed by LALA Munoz on July 6, 2021 10:14 CDT

## 2021-07-06 NOTE — PATIENT INSTRUCTIONS
Exercising to Lose Weight  Exercise is structured, repetitive physical activity to improve fitness and health. Getting regular exercise is important for everyone. It is especially important if you are overweight. Being overweight increases your risk of heart disease, stroke, diabetes, high blood pressure, and several types of cancer. Reducing your calorie intake and exercising can help you lose weight.  Exercise is usually categorized as moderate or vigorous intensity. To lose weight, most people need to do a certain amount of moderate-intensity or vigorous-intensity exercise each week.  Moderate-intensity exercise    Moderate-intensity exercise is any activity that gets you moving enough to burn at least three times more energy (calories) than if you were sitting.  Examples of moderate exercise include:  · Walking a mile in 15 minutes.  · Doing light yard work.  · Biking at an easy pace.  Most people should get at least 150 minutes (2 hours and 30 minutes) a week of moderate-intensity exercise to maintain their body weight.  Vigorous-intensity exercise  Vigorous-intensity exercise is any activity that gets you moving enough to burn at least six times more calories than if you were sitting. When you exercise at this intensity, you should be working hard enough that you are not able to carry on a conversation.  Examples of vigorous exercise include:  · Running.  · Playing a team sport, such as football, basketball, and soccer.  · Jumping rope.  Most people should get at least 75 minutes (1 hour and 15 minutes) a week of vigorous-intensity exercise to maintain their body weight.  How can exercise affect me?  When you exercise enough to burn more calories than you eat, you lose weight. Exercise also reduces body fat and builds muscle. The more muscle you have, the more calories you burn. Exercise also:  · Improves mood.  · Reduces stress and tension.  · Improves your overall fitness, flexibility, and  endurance.  · Increases bone strength.  The amount of exercise you need to lose weight depends on:  · Your age.  · The type of exercise.  · Any health conditions you have.  · Your overall physical ability.  Talk to your health care provider about how much exercise you need and what types of activities are safe for you.  What actions can I take to lose weight?  Nutrition    · Make changes to your diet as told by your health care provider or diet and nutrition specialist (dietitian). This may include:  ? Eating fewer calories.  ? Eating more protein.  ? Eating less unhealthy fats.  ? Eating a diet that includes fresh fruits and vegetables, whole grains, low-fat dairy products, and lean protein.  ? Avoiding foods with added fat, salt, and sugar.  · Drink plenty of water while you exercise to prevent dehydration or heat stroke.  Activity  · Choose an activity that you enjoy and set realistic goals. Your health care provider can help you make an exercise plan that works for you.  · Exercise at a moderate or vigorous intensity most days of the week.  ? The intensity of exercise may vary from person to person. You can tell how intense a workout is for you by paying attention to your breathing and heartbeat. Most people will notice their breathing and heartbeat get faster with more intense exercise.  · Do resistance training twice each week, such as:  ? Push-ups.  ? Sit-ups.  ? Lifting weights.  ? Using resistance bands.  · Getting short amounts of exercise can be just as helpful as long structured periods of exercise. If you have trouble finding time to exercise, try to include exercise in your daily routine.  ? Get up, stretch, and walk around every 30 minutes throughout the day.  ? Go for a walk during your lunch break.  ? Park your car farther away from your destination.  ? If you take public transportation, get off one stop early and walk the rest of the way.  ? Make phone calls while standing up and walking  around.  ? Take the stairs instead of elevators or escalators.  · Wear comfortable clothes and shoes with good support.  · Do not exercise so much that you hurt yourself, feel dizzy, or get very short of breath.  Where to find more information  · [place] Department of Health and Human Services: www.hhs.gov  · Centers for Disease Control and Prevention (CDC): www.cdc.gov  Contact a health care provider:  · Before starting a new exercise program.  · If you have questions or concerns about your weight.  · If you have a medical problem that keeps you from exercising.  Get help right away if you have any of the following while exercising:  · Injury.  · Dizziness.  · Difficulty breathing or shortness of breath that does not go away when you stop exercising.  · Chest pain.  · Rapid heartbeat.  Summary  · Being overweight increases your risk of heart disease, stroke, diabetes, high blood pressure, and several types of cancer.  · Losing weight happens when you burn more calories than you eat.  · Reducing the amount of calories you eat in addition to getting regular moderate or vigorous exercise each week helps you lose weight.  This information is not intended to replace advice given to you by your health care provider. Make sure you discuss any questions you have with your health care provider.  Document Revised: 04/15/2021 Document Reviewed: 04/15/2021  [employer] Patient Education © 2021 [employer]

## 2021-07-08 ENCOUNTER — APPOINTMENT (OUTPATIENT)
Dept: ONCOLOGY | Facility: HOSPITAL | Age: 41
End: 2021-07-08

## 2021-08-03 ENCOUNTER — OFFICE VISIT (OUTPATIENT)
Dept: FAMILY MEDICINE CLINIC | Facility: CLINIC | Age: 41
End: 2021-08-03

## 2021-08-03 VITALS
BODY MASS INDEX: 44.1 KG/M2 | SYSTOLIC BLOOD PRESSURE: 132 MMHG | OXYGEN SATURATION: 98 % | DIASTOLIC BLOOD PRESSURE: 84 MMHG | HEIGHT: 71 IN | HEART RATE: 79 BPM | TEMPERATURE: 97.8 F | WEIGHT: 315 LBS

## 2021-08-03 DIAGNOSIS — G56.02 CARPAL TUNNEL SYNDROME OF LEFT WRIST: ICD-10-CM

## 2021-08-03 DIAGNOSIS — Z09 HOSPITAL DISCHARGE FOLLOW-UP: Primary | ICD-10-CM

## 2021-08-03 DIAGNOSIS — Z79.4 TYPE 2 DIABETES MELLITUS WITH DIABETIC NEUROPATHY, WITH LONG-TERM CURRENT USE OF INSULIN (HCC): Chronic | ICD-10-CM

## 2021-08-03 DIAGNOSIS — E11.40 TYPE 2 DIABETES MELLITUS WITH DIABETIC NEUROPATHY, WITH LONG-TERM CURRENT USE OF INSULIN (HCC): Chronic | ICD-10-CM

## 2021-08-03 DIAGNOSIS — Z91.199 NONCOMPLIANCE: Chronic | ICD-10-CM

## 2021-08-03 PROCEDURE — 99213 OFFICE O/P EST LOW 20 MIN: CPT | Performed by: NURSE PRACTITIONER

## 2021-08-03 RX ORDER — GABAPENTIN 300 MG/1
600 CAPSULE ORAL 3 TIMES DAILY
Qty: 180 CAPSULE | Refills: 0 | Status: SHIPPED | OUTPATIENT
Start: 2021-08-03 | End: 2021-09-13 | Stop reason: SDUPTHER

## 2021-08-03 NOTE — PROGRESS NOTES
CC: Hospital Follow Up Visit ([facility] ER FU, was having chest pain, told him he had fluid around his heart an his kidneys started to shut down ) and Numbness (first three fingers are numb, x1 week, left hand )      Subjective:  Yordy Hansen is a 40 y.o. male who presents for     Presents to office today for hospital follow-up.  Was admitted to [facility] on [id number].  Admitted for chest pain, found to have hyperglycemia, SARAH, and UTI.  Serial troponins and EKG were negative.  Patient did become hypotensive during his stay with acute kidney injury after receiving Lasix 80mg IV.  Patient was given fluid bolus and some of his medications were held because of this. Labs corrected after treatment. Dr. Cadlwell with cardiology was consulted. Patient presents today feeling well denies any chest pain.    DM- Pt states he has trouble remembering to take his insulin. [facility] wasn't able to get Trulicity filled due to costs.  takes his lantus 70 units every night, but can't remember to take the Humalog 35 units with meals- this is what he can't remember.     C/O Left hand first 3 digits with numbness. This is constant over the past week onset. Does have intermittent pain that is an ache. Rates pain at 3 on pain scale when occurs. Doesn't radiate. No treatment prior to arrival.      The following portions of the patient's history were reviewed and updated as appropriate: allergies, current medications, past family history, past medical history, past social history, past surgical history and problem list.    Past Medical History:   Diagnosis Date   • Asthma    • CAD (coronary artery disease)    • Chronic back pain    • Chronic pulmonary embolism (CMS/HCC)    • Coronary artery disease    • Diabetes mellitus (CMS/HCC)    • Factor II deficiency (CMS/HCC)    • Fatty liver    • GERD (gastroesophageal reflux disease)    • Hyperlipidemia    • Hypertension    • Morbid obesity (CMS/HCC)    •  RLS (restless legs syndrome)    • Seizures (CMS/HCC)    • Sleep apnea          Current Outpatient Medications:   •  albuterol (PROVENTIL HFA;VENTOLIN HFA) 108 (90 BASE) MCG/ACT inhaler, Inhale 2 puffs Every 4 (Four) Hours As Needed for Wheezing., Disp: , Rfl:   •  amLODIPine (NORVASC) 5 MG tablet, Take 1 tablet by mouth Daily., Disp: 30 tablet, Rfl: 0  •  atorvastatin (LIPITOR) 80 MG tablet, Take 1 tablet by mouth Every Night., Disp: 90 tablet, Rfl: 3  •  dilTIAZem CD (Cardizem CD) 120 MG 24 hr capsule, Take 1 capsule by mouth Daily., Disp: 90 capsule, Rfl: 0  •  Dulaglutide (Trulicity) 0.75 MG/0.5ML solution pen-injector, Inject 0.75 mg under the skin into the appropriate area as directed 1 (One) Time Per Week., Disp: 4 pen, Rfl: 3  •  Evolocumab (Repatha SureClick) solution auto-injector SureClick injection, Inject 1 mL under the skin into the appropriate area as directed Every 14 (Fourteen) Days., Disp: 2 pen, Rfl: 5  •  fenofibrate micronized (LOFIBRA) 134 MG capsule, Take 1 capsule by mouth Every Morning Before Breakfast., Disp: 90 capsule, Rfl: 3  •  gabapentin (NEURONTIN) 300 MG capsule, Take 2 capsules by mouth 3 (Three) Times a Day., Disp: 180 capsule, Rfl: 0  •  glucose blood test strip, Use as instructed, Disp: 100 each, Rfl: 12  •  glucose monitor monitoring kit, 1 each As Needed (check blood glucose 3x daily)., Disp: 1 each, Rfl: 3  •  insulin glargine (LANTUS) 100 UNIT/ML injection, Inject 60 Units under the skin into the appropriate area as directed Every Night., Disp: 50 mL, Rfl: 3  •  insulin lispro (HumaLOG) 100 UNIT/ML injection, Inject 25 Units under the skin into the appropriate area as directed 3 (Three) Times a Day Before Meals., Disp: , Rfl:   •  ipratropium-albuterol (DUO-NEB) 0.5-2.5 mg/3 ml nebulizer, Take 3 mL by nebulization 4 (Four) Times a Day., Disp: 360 mL, Rfl: 1  •  isosorbide mononitrate (IMDUR) 120 MG 24 hr tablet, Take 1 tablet by mouth Every Morning for 30 days., Disp: 30  "tablet, Rfl: 0  •  methocarbamol (Robaxin) 500 MG tablet, Take 1 tablet by mouth 4 (Four) Times a Day., Disp: 56 tablet, Rfl: 3  •  metoprolol succinate XL (TOPROL-XL) 50 MG 24 hr tablet, Take 1 tablet by mouth Daily., Disp: 90 tablet, Rfl: 3  •  Microlet Lancets misc, One touch delica lancets, Disp: 100 each, Rfl: 5  •  nitroglycerin (NITROSTAT) 0.4 MG SL tablet, Place 1 tablet under the tongue Every 5 (Five) Minutes As Needed for Chest Pain for up to 15 days., Disp: 25 tablet, Rfl: 6  •  pantoprazole (PROTONIX) 40 MG EC tablet, Take 1 tablet by mouth Every Night., Disp: 90 tablet, Rfl: 3  •  pramipexole (MIRAPEX) 1 MG tablet, Take 2 tablets by mouth Every Night., Disp: 180 tablet, Rfl: 3  •  prasugrel (EFFIENT) 10 MG tablet, Take 1 tablet by mouth Daily., Disp: 90 tablet, Rfl: 3  •  ranolazine (RANEXA) 1000 MG 12 hr tablet, Take 1 tablet by mouth Every 12 (Twelve) Hours., Disp: 60 tablet, Rfl: 5  •  ReliOn Insulin Syringe 31G X 15/64\" 0.5 ML misc, For use with insulin, Disp: 100 each, Rfl: 5  •  SITagliptin (Januvia) 100 MG tablet, Take 1 tablet by mouth Daily., Disp: 30 tablet, Rfl: 5  •  traZODone (DESYREL) 300 MG tablet, Take 1 tablet by mouth every night at bedtime., Disp: 90 tablet, Rfl: 3  •  warfarin (Coumadin) 7.5 MG tablet, Take 1 tablet nightly or as directed by [facility], Disp: 30 tablet, Rfl: 0  •  lisinopril (PRINIVIL,ZESTRIL) 40 MG tablet, Take 1 tablet by mouth Every Night., Disp: 90 tablet, Rfl: 3    Review of Systems    Review of Systems   Constitutional: Negative.    HENT: Negative.    Eyes: Negative.    Respiratory: Positive for shortness of breath.    Cardiovascular: Negative.    Gastrointestinal: Negative.    Endocrine: Negative.    Genitourinary: Negative.    Musculoskeletal: Negative.    Skin: Negative.    Allergic/Immunologic: Negative.    Neurological: Positive for numbness.   Hematological: Negative.    Psychiatric/Behavioral: Negative.    All other systems reviewed and are " "negative.      Objective  Vitals:    08/03/21 1005   BP: 132/84   Pulse: 79   Temp: 97.8 °F (36.6 °C)   SpO2: 98%   Weight: (!) 194 kg (427 lb)   Height: 180.3 cm (71\")     Body mass index is 59.55 kg/m².    Physical Exam    Physical Exam  Vitals and nursing note reviewed.   Constitutional:       General: He is not in acute distress.     Appearance: He is well-developed.   HENT:      Head: Normocephalic and atraumatic.      Right Ear: External ear normal.      Left Ear: External ear normal.   Eyes:      Conjunctiva/sclera: Conjunctivae normal.   Cardiovascular:      Rate and Rhythm: Normal rate and regular rhythm.      Pulses: Normal pulses.      Heart sounds: Normal heart sounds. No murmur heard.   No friction rub. No gallop.    Pulmonary:      Effort: Pulmonary effort is normal. No respiratory distress.      Breath sounds: Normal breath sounds. No stridor. No wheezing, rhonchi or rales.   Chest:      Chest wall: No tenderness.   Abdominal:      General: Bowel sounds are normal. There is no distension.      Palpations: Abdomen is soft. There is no mass.      Tenderness: There is no abdominal tenderness. There is no guarding or rebound.      Hernia: No hernia is present.   Musculoskeletal:      Cervical back: Normal range of motion and neck supple.      Right lower leg: No edema.      Left lower leg: No edema.   Skin:     General: Skin is warm and dry.      Findings: No rash.   Neurological:      Mental Status: He is alert and oriented to person, place, and time.   Psychiatric:         Behavior: Behavior normal.         Thought Content: Thought content normal.         Judgment: Judgment normal.             Diagnoses and all orders for this visit:    1. Hospital discharge follow-up (Primary)    2. Type 2 diabetes mellitus with diabetic neuropathy, with long-term current use of insulin (CMS/[facility])  -     gabapentin (NEURONTIN) 300 MG capsule; Take 2 capsules by mouth 3 (Three) Times a Day.  Dispense: 180 capsule; " Refill: 0    3. Noncompliance    4. Carpal tunnel syndrome of left wrist       This is a hospital discharge follow-up.  Patient encouraged to keep his follow-up appointment with cardiology on August 12, 2021.  For the type 2 diabetes we have given patient samples of Trulicity since he has not been getting this filled.  Patient is noncompliant with insulin regimen.  Encouraged patient to do better at remembering to take his insulin at mealtime.  Advised to keep appointment with endocrinology October 5, 2021.  Patient has follow-up with me on October 7, 2021.  Encouraged to keep these appointments.  Patient is due for refill on his gabapentin and this was filled today.  For the carpal tunnel syndrome, patient wishes to just follow this for now declines referral to orthopedic surgery.    Patient understands the risks associated with this controlled medication, including tolerance and addiction.  he also agrees to only obtain this medication from me, and not from a another provider, unless that provider is covering for me in my absence.  he also agrees to be compliant in dosing, and not self adjust the dose of medication.  A signed controlled substance agreement is on file, and he has received a controlled substance education sheet at this a previous visit.  he has also signed a consent for treatment with a controlled substance as per [facility] policy. CARY was obtained.      This document has been electronically signed by LALA Munoz on August 3, 2021 10:41 CDT

## 2021-09-02 ENCOUNTER — OFFICE VISIT (OUTPATIENT)
Dept: CARDIOLOGY | Facility: CLINIC | Age: 41
End: 2021-09-02

## 2021-09-02 VITALS
WEIGHT: 315 LBS | DIASTOLIC BLOOD PRESSURE: 90 MMHG | SYSTOLIC BLOOD PRESSURE: 160 MMHG | HEART RATE: 107 BPM | TEMPERATURE: 97.7 F | OXYGEN SATURATION: 98 % | BODY MASS INDEX: 44.1 KG/M2 | HEIGHT: 71 IN

## 2021-09-02 DIAGNOSIS — R07.9 CHEST PAIN, UNSPECIFIED TYPE: Primary | ICD-10-CM

## 2021-09-02 PROCEDURE — 93000 ELECTROCARDIOGRAM COMPLETE: CPT | Performed by: INTERNAL MEDICINE

## 2021-09-02 PROCEDURE — 99214 OFFICE O/P EST MOD 30 MIN: CPT | Performed by: INTERNAL MEDICINE

## 2021-09-02 NOTE — PROGRESS NOTES
[facility] Cardiology  OFFICE NOTE    Cardiovascular Medicine  Demetri Tejada M.D., RPVI         No referring provider defined for this encounter.    Thank you for asking me to see Yordy Hansen for CAD.    History of Present Illness  This is a 40 y.o. male with:    1.  Coronary artery disease  2.  Diabetes  3.  Hyperlipidemia  5 hypertension  6.  Morbid obesity  7.  Pulmonary embolism  8.  A. fib     Yordy Hansen is a 40 y.o. male who presents for consultation today. a very pleasant 40 y.o.-year-old gentleman with history of hypertension, hyperlipidemia, diabetes with extensive history of coronary artery disease, he has PCI x3 to his LAD.  First 1 was in 2016, setting of myocardial infarction, subsequently he had in-stent restenosis in 2017 so he got another layer of stent with a 2.5 x 18 mm Xience, subsequently he was followed by Dr. Briceno and had another PCI to his LAD with a 3.0 Orsiro stent.   Patient was admitted to the hospital again in September with chest pain.  Subsequent nuclear stress test concerning for moderate ischemia anterolateral walls of the underwent repeat cardiac cath.  Cardiac cath in September 2020 showed mild in-stent restenosis with LONNIE-3 flow in the LAD.    Was recently in the hospital again in October with FERNANDO saul with RVR.    Admitted to hospital with SARAH last month,, presented with dehydration.  His ACE inhibitor was held was nonflow restricting resolution of his SARAH.  Recent creatinine was normal at 0.8.  He is back on his lisinopril.    Unfortunately he keeps gaining weight, not sure about compliance with his medications.  Has intermittent palpitations atypical chest pain.  You will also need for a couple of seconds of sharp in character.      Review of Systems - ROS  Constitution: Negative for weakness, weight gain and weight loss.   HENT: Negative for congestion.    Eyes: Negative for blurred vision.   Cardiovascular: As mentioned above  Respiratory:  Negative for cough and hemoptysis.    Endocrine: Negative for polydipsia and polyuria.   Hematologic/Lymphatic: Negative for bleeding problem. Does not bruise/bleed easily.   Skin: Negative for flushing.   Musculoskeletal: Negative for neck pain and stiffness.   Gastrointestinal: Negative for abdominal pain, diarrhea, jaundice, melena, nausea and vomiting.   Genitourinary: Negative for dysuria and hematuria.   Neurological: Negative for dizziness, focal weakness and numbness.   Psychiatric/Behavioral: Negative for altered mental status and depression.          All other systems were reviewed and were negative.    family history includes Cancer in his paternal grandfather; Heart disease in his mother; Obesity in his mother; Sleep apnea in his father.     reports that he quit smoking about 24 years ago. His smoking use included cigarettes. He has a 0.13 pack-year smoking history. His smokeless tobacco use includes snuff. He reports that he does not drink alcohol and does not use drugs.    Allergies   Allergen Reactions   • Glipizide Other (See Comments) and Unknown (See Comments)     Slurred Speech  Slurred Speech  Hallucinations, Slurred Speech   • Reglan [Metoclopramide] Anxiety   • Tramadol Other (See Comments)     seizures   • Risperidone Other (See Comments)     Slurred speech  Can't stand, trouble breathing, slurred speech           Current Outpatient Medications:   •  albuterol (PROVENTIL HFA;VENTOLIN HFA) 108 (90 BASE) MCG/ACT inhaler, Inhale 2 puffs Every 4 (Four) Hours As Needed for Wheezing., Disp: , Rfl:   •  amLODIPine (NORVASC) 5 MG tablet, Take 1 tablet by mouth Daily., Disp: 30 tablet, Rfl: 0  •  atorvastatin (LIPITOR) 80 MG tablet, Take 1 tablet by mouth Every Night., Disp: 90 tablet, Rfl: 3  •  dilTIAZem CD (Cardizem CD) 120 MG 24 hr capsule, Take 1 capsule by mouth Daily., Disp: 90 capsule, Rfl: 0  •  Dulaglutide (Trulicity) 0.75 MG/0.5ML solution pen-injector, Inject 0.75 mg under the skin into the  appropriate area as directed 1 (One) Time Per Week., Disp: 4 pen, Rfl: 3  •  Evolocumab (Repatha SureClick) solution auto-injector SureClick injection, Inject 1 mL under the skin into the appropriate area as directed Every 14 (Fourteen) Days., Disp: 2 pen, Rfl: 5  •  fenofibrate micronized (LOFIBRA) 134 MG capsule, Take 1 capsule by mouth Every Morning Before Breakfast., Disp: 90 capsule, Rfl: 3  •  gabapentin (NEURONTIN) 300 MG capsule, Take 2 capsules by mouth 3 (Three) Times a Day., Disp: 180 capsule, Rfl: 0  •  glucose blood test strip, Use as instructed, Disp: 100 each, Rfl: 12  •  glucose monitor monitoring kit, 1 each As Needed (check blood glucose 3x daily)., Disp: 1 each, Rfl: 3  •  insulin glargine (LANTUS) 100 UNIT/ML injection, Inject 60 Units under the skin into the appropriate area as directed Every Night., Disp: 50 mL, Rfl: 3  •  insulin lispro (HumaLOG) 100 UNIT/ML injection, Inject 25 Units under the skin into the appropriate area as directed 3 (Three) Times a Day Before Meals., Disp: , Rfl:   •  ipratropium-albuterol (DUO-NEB) 0.5-2.5 mg/3 ml nebulizer, Take 3 mL by nebulization 4 (Four) Times a Day., Disp: 360 mL, Rfl: 1  •  isosorbide mononitrate (IMDUR) 120 MG 24 hr tablet, Take 1 tablet by mouth Every Morning for 30 days., Disp: 30 tablet, Rfl: 0  •  lisinopril (PRINIVIL,ZESTRIL) 40 MG tablet, Take 1 tablet by mouth Every Night., Disp: 90 tablet, Rfl: 3  •  methocarbamol (Robaxin) 500 MG tablet, Take 1 tablet by mouth 4 (Four) Times a Day., Disp: 56 tablet, Rfl: 3  •  metoprolol succinate XL (TOPROL-XL) 50 MG 24 hr tablet, Take 1 tablet by mouth Daily., Disp: 90 tablet, Rfl: 3  •  Microlet Lancets misc, One touch delica lancets, Disp: 100 each, Rfl: 5  •  nitroglycerin (NITROSTAT) 0.4 MG SL tablet, Place 1 tablet under the tongue Every 5 (Five) Minutes As Needed for Chest Pain for up to 15 days., Disp: 25 tablet, Rfl: 6  •  pantoprazole (PROTONIX) 40 MG EC tablet, Take 1 tablet by mouth Every  "Night., Disp: 90 tablet, Rfl: 3  •  pramipexole (MIRAPEX) 1 MG tablet, Take 2 tablets by mouth Every Night., Disp: 180 tablet, Rfl: 3  •  prasugrel (EFFIENT) 10 MG tablet, Take 1 tablet by mouth Daily., Disp: 90 tablet, Rfl: 3  •  ranolazine (RANEXA) 1000 MG 12 hr tablet, Take 1 tablet by mouth Every 12 (Twelve) Hours., Disp: 60 tablet, Rfl: 5  •  ReliOn Insulin Syringe 31G X 15/64\" 0.5 ML misc, For use with insulin, Disp: 100 each, Rfl: 5  •  rivaroxaban (XARELTO) 20 MG tablet, Take 20 mg by mouth Daily With Dinner., Disp: , Rfl:   •  SITagliptin (Januvia) 100 MG tablet, Take 1 tablet by mouth Daily., Disp: 30 tablet, Rfl: 5  •  traZODone (DESYREL) 300 MG tablet, Take 1 tablet by mouth every night at bedtime., Disp: 90 tablet, Rfl: 3    Physical Exam:  Vitals:    09/02/21 1041   BP: 160/90   BP Location: Left arm   Patient Position: Sitting   Cuff Size: Large Adult   Pulse: 107   Temp: 97.7 °F (36.5 °C)   SpO2: 98%   Weight: (!) 201 kg (443 lb 12.8 oz)   Height: 180.3 cm (71\")   PainSc:   4   PainLoc: Chest     Current Pain Level: none  Pulse Ox: Normal  on room air  General: alert, appears stated age and cooperative     Body Habitus: well-nourished    HEENT: Head: Normocephalic, no lesions, without obvious abnormality. No arcus senilis, xanthelasma or xanthomas.    Neuro: alert, oriented x3  Pulses: 2+ and symmetric  JVP: Volume/Pulsation: Normal.  Normal waveforms.   Appropriate inspiratory decrease.  No Kussmaul's. No Regan's.   Carotid Exam: no bruit normal pulsation bilaterally   Carotid Volume: normal.     Respirations: no increased work of breathing   Chest:  Normal    Pulmonary:Normal   Precordium: Normal impulses. P2 is not palpable.  RV Heave: absent  LV Heave: absent  Hilo:  normal size and placement  Palpable S4: absent.  Heart rate: normal    Heart Rhythm: regular     Heart Sounds: S1: normal  S2: normal  S3: absent   S4: absent  Opening Snap: absent    Pericardial Rub:  Absent: .    Abdomen: "   Appearance: normal .  Palpation: Soft, non-tender to palpation, bowel sounds positive in all four quadrants; no guarding or rebound tenderness  Extremity: no edema.   LE Skin: no rashes  LE Hair:  normal  LE Pulses: well perfused with normal pulses in the distal extremities  Pallor on elevation: Absent. Rubor on dependency: None      DATA REVIEWED:     EKG. I personally reviewed and interpreted the EKG.      ECG/EMG Results (all)     None        ---------------------------------------------------  TTE/MONICA:  Results for orders placed during the hospital encounter of 03/31/21    Adult Transthoracic Echo Complete W/ Cont if Necessary Per Protocol    Interpretation Summary  · Left ventricular ejection fraction appears to be 61 - 65%. Left ventricular systolic function is normal.  · The right ventricular cavity is mildly dilated.  · Left ventricular diastolic function was normal.            --------------------------------------------------------------------------------------------------  LABS:     The CVD Risk score (Gisela et al., 2008) failed to calculate for the following reasons:    The patient has a prior MI, stroke, CHF, or peripheral vascular disease diagnosis         Lab Results   Component Value Date    GLUCOSE 355 (H) 06/12/2021    BUN 11 06/12/2021    CREATININE 0.79 06/12/2021    EGFRIFNONA 108 06/12/2021    EGFRIFAFRI 150 09/17/2019    BCR 13.9 06/12/2021    K 4.4 06/12/2021    CO2 25.0 06/12/2021    CALCIUM 9.9 06/12/2021    ALBUMIN 3.30 (L) 06/08/2021    AST 24 06/08/2021    ALT 20 06/08/2021     Lab Results   Component Value Date    WBC 9.54 06/12/2021    HGB 14.0 06/12/2021    HCT 40.7 06/12/2021    MCV 82.1 06/12/2021     06/12/2021     Lab Results   Component Value Date    CHOL 190 07/26/2021    CHLPL 119 12/14/2019    TRIG 295 (H) 07/26/2021    HDL 31 (L) 07/26/2021     (H) 07/26/2021     Lab Results   Component Value Date    TSH 0.60 07/10/2019    Z9ASHZO 147 09/05/2014     U9IZWFS 10.0 09/05/2014     Lab Results   Component Value Date    CKTOTAL 242 (H) 01/06/2021    CKMB 2.4 05/15/2021    CKMBINDEX 0.61 04/01/2020    TROPONINI <0.02 08/19/2021    TROPONINT <0.010 06/09/2021     Lab Results   Component Value Date    HGBA1C 11.1 (H) 07/25/2021     Lab Results   Component Value Date    DDIMER 278 03/19/2021     Lab Results   Component Value Date    ALT 20 06/08/2021     Lab Results   Component Value Date    HGBA1C 11.1 (H) 07/25/2021    HGBA1C 11.80 (H) 06/09/2021    HGBA1C 10.90 (H) 04/07/2021     Lab Results   Component Value Date    MICROALBUR 49.7 04/07/2021    CREATININE 0.79 06/12/2021     Lab Results   Component Value Date    FERRITIN 171.90 01/06/2021     Lab Results   Component Value Date    INR 0.93 (L) 08/19/2021    INR 1.15 06/11/2021    INR 1.05 06/10/2021    PROTIME 9.3 (L) 08/19/2021    PROTIME 15.2 06/11/2021    PROTIME 14.1 06/10/2021       Assessment/Plan     1.  Coronary artery disease:  3 layers of stent in his mid distal LAD.  Recent cardiac cath in September 2020 was with patent stent with mild in-stent restenosis.  Continue Effient.  Aspirin was stopped because he has been on Xarelto for his A. Fib  Continue Ranexa  Continue high intensity statin/Cardizem/Imdur/metoprolol XL  Reiterated importance of quitting smoking and weight loss and exercise.    2.  Hypertension:  controlled on current regimen of Cardizem 120 mg, Imdur 120 mg, lisinopril 40 mg, metoprolol XL 50 mg    3.  Hyperlipidemia:  On Lipitor 80 mg LDL still more than 100.   added Zetia    4.  PE:  Continue Xarelto    5.  Diabetes:  On insulin.    6.  A. fib:  New diagnosis for him, was admitted in October with A. fib with RVR.  Converted sinus rhythm on his own.  [id number] score is 3.  He is already anticoagulation with Xarelto.  Currently normal sinus continue metoprolol and Cardizem.  Reiterated importance of weight loss.    Prevention:  Patient's Body mass index is 61.9 kg/m². BMI is above normal  parameters. Recommendations include: exercise counseling and nutrition counseling.      Yordy Hansen  reports that he quit smoking about 24 years ago. His smoking use included cigarettes. He has a 0.13 pack-year smoking history. His smokeless tobacco use includes snuff.               This document has been electronically signed by Demetri Tejada MD on September 2, 2021 10:49 CDT

## 2021-09-13 DIAGNOSIS — E11.40 TYPE 2 DIABETES MELLITUS WITH DIABETIC NEUROPATHY, WITH LONG-TERM CURRENT USE OF INSULIN (HCC): Chronic | ICD-10-CM

## 2021-09-13 DIAGNOSIS — Z79.4 TYPE 2 DIABETES MELLITUS WITH DIABETIC NEUROPATHY, WITH LONG-TERM CURRENT USE OF INSULIN (HCC): Chronic | ICD-10-CM

## 2021-09-13 LAB
QT INTERVAL: 362 MS
QTC INTERVAL: 483 MS

## 2021-09-13 RX ORDER — GABAPENTIN 300 MG/1
600 CAPSULE ORAL 3 TIMES DAILY
Qty: 180 CAPSULE | Refills: 0 | Status: SHIPPED | OUTPATIENT
Start: 2021-09-13 | End: 2021-10-11 | Stop reason: SDUPTHER

## 2021-09-30 ENCOUNTER — OFFICE VISIT (OUTPATIENT)
Dept: FAMILY MEDICINE CLINIC | Facility: CLINIC | Age: 41
End: 2021-09-30

## 2021-09-30 VITALS
HEIGHT: 71 IN | HEART RATE: 70 BPM | DIASTOLIC BLOOD PRESSURE: 92 MMHG | WEIGHT: 315 LBS | SYSTOLIC BLOOD PRESSURE: 144 MMHG | OXYGEN SATURATION: 95 % | BODY MASS INDEX: 44.1 KG/M2

## 2021-09-30 DIAGNOSIS — Z09 HOSPITAL DISCHARGE FOLLOW-UP: ICD-10-CM

## 2021-09-30 DIAGNOSIS — E11.65 TYPE 2 DIABETES MELLITUS WITH HYPERGLYCEMIA, WITH LONG-TERM CURRENT USE OF INSULIN (HCC): Primary | Chronic | ICD-10-CM

## 2021-09-30 DIAGNOSIS — G25.81 RLS (RESTLESS LEGS SYNDROME): Chronic | ICD-10-CM

## 2021-09-30 DIAGNOSIS — Z79.4 TYPE 2 DIABETES MELLITUS WITH HYPERGLYCEMIA, WITH LONG-TERM CURRENT USE OF INSULIN (HCC): Primary | Chronic | ICD-10-CM

## 2021-09-30 DIAGNOSIS — I10 ESSENTIAL HYPERTENSION: Chronic | ICD-10-CM

## 2021-09-30 DIAGNOSIS — G47.00 INSOMNIA, UNSPECIFIED TYPE: Chronic | ICD-10-CM

## 2021-09-30 DIAGNOSIS — M62.838 MUSCLE SPASM: Chronic | ICD-10-CM

## 2021-09-30 DIAGNOSIS — I25.10 CORONARY ARTERY DISEASE INVOLVING NATIVE CORONARY ARTERY OF NATIVE HEART WITHOUT ANGINA PECTORIS: Chronic | ICD-10-CM

## 2021-09-30 DIAGNOSIS — E11.42 DIABETIC PERIPHERAL NEUROPATHY ASSOCIATED WITH TYPE 2 DIABETES MELLITUS (HCC): Chronic | ICD-10-CM

## 2021-09-30 DIAGNOSIS — S76.311D HAMSTRING STRAIN, RIGHT, SUBSEQUENT ENCOUNTER: ICD-10-CM

## 2021-09-30 DIAGNOSIS — E78.5 HYPERLIPIDEMIA, UNSPECIFIED HYPERLIPIDEMIA TYPE: Chronic | ICD-10-CM

## 2021-09-30 PROCEDURE — 99214 OFFICE O/P EST MOD 30 MIN: CPT | Performed by: NURSE PRACTITIONER

## 2021-09-30 RX ORDER — INSULIN GLARGINE 100 [IU]/ML
80 INJECTION, SOLUTION SUBCUTANEOUS NIGHTLY
Qty: 70 ML | Refills: 3 | Status: SHIPPED | OUTPATIENT
Start: 2021-09-30

## 2021-09-30 RX ORDER — LANCETS
EACH MISCELLANEOUS
Qty: 150 EACH | Refills: 5 | Status: SHIPPED | OUTPATIENT
Start: 2021-09-30

## 2021-09-30 RX ORDER — PRAMIPEXOLE DIHYDROCHLORIDE 1 MG/1
2 TABLET ORAL NIGHTLY
Qty: 180 TABLET | Refills: 3 | Status: SHIPPED | OUTPATIENT
Start: 2021-09-30 | End: 2022-10-05

## 2021-09-30 RX ORDER — HYDROCODONE BITARTRATE AND ACETAMINOPHEN 7.5; 325 MG/1; MG/1
TABLET ORAL
COMMUNITY
Start: 2021-09-24 | End: 2021-12-29

## 2021-09-30 RX ORDER — FENOFIBRATE 134 MG/1
134 CAPSULE ORAL
Qty: 90 CAPSULE | Refills: 3 | Status: SHIPPED | OUTPATIENT
Start: 2021-09-30 | End: 2022-07-26

## 2021-09-30 RX ORDER — PRASUGREL 10 MG/1
10 TABLET, FILM COATED ORAL DAILY
Qty: 90 TABLET | Refills: 3 | Status: SHIPPED | OUTPATIENT
Start: 2021-09-30

## 2021-09-30 RX ORDER — EVOLOCUMAB 140 MG/ML
140 INJECTION, SOLUTION SUBCUTANEOUS
Qty: 2 PEN | Refills: 5 | Status: SHIPPED | OUTPATIENT
Start: 2021-09-30

## 2021-09-30 RX ORDER — RANOLAZINE 1000 MG/1
1000 TABLET, EXTENDED RELEASE ORAL EVERY 12 HOURS SCHEDULED
Qty: 180 TABLET | Refills: 3 | Status: SHIPPED | OUTPATIENT
Start: 2021-09-30

## 2021-09-30 RX ORDER — DULAGLUTIDE 1.5 MG/.5ML
1.5 INJECTION, SOLUTION SUBCUTANEOUS WEEKLY
Qty: 5 PEN | Refills: 5 | Status: ON HOLD | OUTPATIENT
Start: 2021-09-30 | End: 2022-01-15 | Stop reason: SDUPTHER

## 2021-09-30 RX ORDER — LISINOPRIL 40 MG/1
40 TABLET ORAL NIGHTLY
Qty: 90 TABLET | Refills: 3 | Status: SHIPPED | OUTPATIENT
Start: 2021-09-30 | End: 2022-07-26

## 2021-09-30 RX ORDER — ATORVASTATIN CALCIUM 80 MG/1
80 TABLET, FILM COATED ORAL NIGHTLY
Qty: 90 TABLET | Refills: 3 | Status: SHIPPED | OUTPATIENT
Start: 2021-09-30 | End: 2022-10-03

## 2021-09-30 RX ORDER — TRAZODONE HYDROCHLORIDE 300 MG/1
300 TABLET ORAL
Qty: 90 TABLET | Refills: 3 | Status: SHIPPED | OUTPATIENT
Start: 2021-09-30 | End: 2022-10-05

## 2021-09-30 RX ORDER — METOPROLOL SUCCINATE 50 MG/1
50 TABLET, EXTENDED RELEASE ORAL DAILY
Qty: 90 TABLET | Refills: 3 | Status: SHIPPED | OUTPATIENT
Start: 2021-09-30 | End: 2022-01-15 | Stop reason: HOSPADM

## 2021-09-30 RX ORDER — METHOCARBAMOL 500 MG/1
500 TABLET, FILM COATED ORAL 4 TIMES DAILY
Qty: 56 TABLET | Refills: 3 | Status: CANCELLED | OUTPATIENT
Start: 2021-09-30

## 2021-09-30 NOTE — PATIENT INSTRUCTIONS
Diabetes Mellitus Action Plan  Following a diabetes action plan is a way for you to manage your diabetes (diabetes mellitus) symptoms. The plan is color-coded to help you understand what actions you need to take based on any symptoms you are having.  · If you have symptoms in the red zone, you need medical care right away.  · If you have symptoms in the yellow zone, you are having problems.  · If you have symptoms in the green zone, you are doing well.  Learning about and understanding diabetes can take time. Follow the plan that you develop with your health care provider. Know the target range for your blood sugar (glucose) level, and review your treatment plan with your health care provider at each visit.  The target range for my blood sugar level is __________________________ mg/dL.  Red zone  Get medical help right away if you have any of the following symptoms:  · A blood sugar test result that is below 54 mg/dL (3 mmol/L).  · A blood sugar test result that is at or above 240 mg/dL (13.3 mmol/L) for 2 days in a row.  · Confusion or trouble thinking clearly.  · Difficulty breathing.  · Sickness or a fever for 2 or more days that is not getting better.  · Moderate or large ketone levels in your urine.  · Feeling tired or having no energy.  If you have any red zone symptoms, do not wait to see if the symptoms will go away. Get medical help right away. Call your local emergency services (911 in the [place] Do not drive yourself to the hospital.  If you have severely low blood sugar (severe hypoglycemia) and you cannot eat or drink, you may need glucagon. Make sure a family member or close friend knows how to check your blood sugar and how to give you glucagon. You may need to be treated in a hospital for this condition.  Yellow zone  If you have any of the following symptoms, your diabetes is not under control and you may need to make some changes:  · A blood sugar test result that is at or above 240 mg/dL (13.3  mmol/L) for 2 days in a row.  · Blood sugar test results that are below 70 mg/dL (3.9 mmol/L).  · Other symptoms of hypoglycemia, such as:  ? Shaking or feeling light-headed.  ? Confusion or irritability.  ? Feeling hungry.  ? Having a fast heartbeat.  If you have any yellow zone symptoms:  · Treat your hypoglycemia by eating or drinking 15 grams of a rapid-acting carbohydrate. Follow the 15:15 rule:  ? Take 15 grams of a rapid-acting carbohydrate, such as:  § 1 tube of glucose gel.  § 4 glucose pills.  § 4 oz (120 mL) of fruit juice.  § 4 oz (120 mL) of regular (not diet) soda.  ? Check your blood sugar 15 minutes after you take the carbohydrate.  ? If the repeat blood sugar test is still at or below 70 mg/dL (3.9 mmol/L), take 15 grams of a carbohydrate again.  ? If your blood sugar does not increase above 70 mg/dL (3.9 mmol/L) after 3 tries, get medical help right away.  ? After your blood sugar returns to normal, eat a meal or a snack within 1 hour.  · Keep taking your daily medicines as told by your health care provider.  · Check your blood sugar more often than you normally would.  ? Write down your results.  ? Call your health care provider if you have trouble keeping your blood sugar in your target range.    Green zone  These signs mean you are doing well and you can continue what you are doing to manage your diabetes:  · Your blood sugar is within your personal target range. For most people, a blood sugar level before a meal (preprandial) should be  mg/dL (4.4-7.2 mmol/L).  · You feel well, and you are able to do daily activities.  If you are in the green zone, continue to manage your diabetes as told by your health care provider. To do this:  · Eat a healthy diet.  · Exercise regularly.  · Check your blood sugar as told by your health care provider.  · Take your medicines as told by your health care provider.    Where to find more information  · American Diabetes Association (ADA):  diabetes.org  · Association of Diabetes Care & Education Specialists (ADCES): diabeteseducator.org  Summary  · Following a diabetes action plan is a way for you to manage your diabetes symptoms. The plan is color-coded to help you understand what actions you need to take based on any symptoms you are having.  · Follow the plan that you develop with your health care provider. Make sure you know your personal target blood sugar level.  · Review your treatment plan with your health care provider at each visit.  This information is not intended to replace advice given to you by your health care provider. Make sure you discuss any questions you have with your health care provider.  Document Revised: 06/24/2021 Document Reviewed: 06/24/2021  Elsevier Patient Education © 2021 [employer]

## 2021-09-30 NOTE — PROGRESS NOTES
CC: Follow-up (3 month FU)      Subjective:  Yordy Hansen is a 41 y.o. male who presents for     Patient in the office for follow-up on diabetes mellitus and peripheral neuropathy.  Patient states he needs all of his medications refilled today. This is also a hospital follow up.  was camping on 09/18/2021 and went to get up in a wagon and fell. Spent 7 days in the hospital for. States thinking he had a hamstring strain in the Rt leg. Wondering if HH PT would help with pain and walking.     Checks sugars at least twice daily.  is running 200-300. Has appointment with Dr. Guillen on 10/5/21. States A1C was checked while he was in the hospital and it was  10.5 which is slightly decreased from 11.1. Pt states he likes the Trulicity and believes it has helped with his sugars some. Will increase this dosage today.    States that the PN feels better than it has in the past. States toes on lt foot doesn't feel as numb as it has in the past. Continues to take Gabapentin for.       The following portions of the patient's history were reviewed and updated as appropriate: allergies, current medications, past family history, past medical history, past social history, past surgical history and problem list.    Past Medical History:   Diagnosis Date   • Asthma    • CAD (coronary artery disease)    • Chronic back pain    • Chronic pulmonary embolism (CMS/HCC)    • Coronary artery disease    • Diabetes mellitus (CMS/HCC)    • Factor II deficiency (CMS/HCC)    • Fatty liver    • GERD (gastroesophageal reflux disease)    • Hyperlipidemia    • Hypertension    • Morbid obesity (CMS/HCC)    • RLS (restless legs syndrome)    • Seizures (CMS/HCC)    • Sleep apnea          Current Outpatient Medications:   •  albuterol (PROVENTIL HFA;VENTOLIN HFA) 108 (90 BASE) MCG/ACT inhaler, Inhale 2 puffs Every 4 (Four) Hours As Needed for Wheezing., Disp: , Rfl:   •  amLODIPine (NORVASC) 5 MG tablet, Take 1 tablet by mouth Daily.,  Disp: 30 tablet, Rfl: 0  •  atorvastatin (LIPITOR) 80 MG tablet, Take 1 tablet by mouth Every Night., Disp: 90 tablet, Rfl: 3  •  dilTIAZem CD (Cardizem CD) 120 MG 24 hr capsule, Take 1 capsule by mouth Daily., Disp: 90 capsule, Rfl: 0  •  Evolocumab (Repatha SureClick) solution auto-injector SureClick injection, Inject 1 mL under the skin into the appropriate area as directed Every 14 (Fourteen) Days., Disp: 2 pen, Rfl: 5  •  fenofibrate micronized (LOFIBRA) 134 MG capsule, Take 1 capsule by mouth Every Morning Before Breakfast., Disp: 90 capsule, Rfl: 3  •  gabapentin (NEURONTIN) 300 MG capsule, Take 2 capsules by mouth 3 (Three) Times a Day., Disp: 180 capsule, Rfl: 0  •  glucose blood test strip, Use as instructed, Disp: 100 each, Rfl: 12  •  glucose monitor monitoring kit, 1 each As Needed (check blood glucose 3x daily)., Disp: 1 each, Rfl: 3  •  insulin glargine (LANTUS, SEMGLEE) 100 UNIT/ML injection, Inject 80 Units under the skin into the appropriate area as directed Every Night. Please give 3 months supply, Disp: 70 mL, Rfl: 3  •  insulin lispro (HumaLOG) 100 UNIT/ML injection, Inject 25 Units under the skin into the appropriate area as directed 3 (Three) Times a Day Before Meals., Disp: , Rfl:   •  ipratropium-albuterol (DUO-NEB) 0.5-2.5 mg/3 ml nebulizer, Take 3 mL by nebulization 4 (Four) Times a Day., Disp: 360 mL, Rfl: 1  •  lisinopril (PRINIVIL,ZESTRIL) 40 MG tablet, Take 1 tablet by mouth Every Night., Disp: 90 tablet, Rfl: 3  •  methocarbamol (Robaxin) 500 MG tablet, Take 1 tablet by mouth 4 (Four) Times a Day., Disp: 56 tablet, Rfl: 3  •  metoprolol succinate XL (TOPROL-XL) 50 MG 24 hr tablet, Take 1 tablet by mouth Daily., Disp: 90 tablet, Rfl: 3  •  Microlet Lancets misc, One touch delica lancets, Disp: 150 each, Rfl: 5  •  pantoprazole (PROTONIX) 40 MG EC tablet, Take 1 tablet by mouth Every Night., Disp: 90 tablet, Rfl: 3  •  pramipexole (MIRAPEX) 1 MG tablet, Take 2 tablets by mouth Every  "Night., Disp: 180 tablet, Rfl: 3  •  prasugrel (EFFIENT) 10 MG tablet, Take 1 tablet by mouth Daily., Disp: 90 tablet, Rfl: 3  •  ranolazine (RANEXA) 1000 MG 12 hr tablet, Take 1 tablet by mouth Every 12 (Twelve) Hours., Disp: 180 tablet, Rfl: 3  •  rivaroxaban (XARELTO) 20 MG tablet, Take 1 tablet by mouth Daily With Dinner., Disp: 90 tablet, Rfl: 3  •  SITagliptin (Januvia) 100 MG tablet, Take 1 tablet by mouth Daily., Disp: 90 tablet, Rfl: 3  •  traZODone (DESYREL) 300 MG tablet, Take 1 tablet by mouth every night at bedtime., Disp: 90 tablet, Rfl: 3  •  Dulaglutide (Trulicity) 1.5 MG/0.5ML solution pen-injector, Inject 1.5 mg under the skin into the appropriate area as directed 1 (One) Time Per Week., Disp: 5 pen, Rfl: 5  •  HYDROcodone-acetaminophen (NORCO) 7.5-325 MG per tablet, TAKE 1 TABLET BY MOUTH EVERY 8 HOURS AS NEEDED FOR UP TO 3 DAYS, Disp: , Rfl:   •  Insulin Syringe-Needle U-100 25G X 1\" 1 ML misc, 1 syringe 4 (Four) Times a Day With Meals & at Bedtime., Disp: 200 each, Rfl: 5  •  isosorbide mononitrate (IMDUR) 120 MG 24 hr tablet, Take 1 tablet by mouth Every Morning for 30 days., Disp: 30 tablet, Rfl: 0  •  nitroglycerin (NITROSTAT) 0.4 MG SL tablet, Place 1 tablet under the tongue Every 5 (Five) Minutes As Needed for Chest Pain for up to 15 days., Disp: 25 tablet, Rfl: 6    Review of Systems    Review of Systems   Constitutional: Negative.    HENT: Negative.    Eyes: Negative.    Respiratory: Negative.    Cardiovascular: Negative.    Gastrointestinal: Negative.    Endocrine: Negative.    Genitourinary: Negative.    Musculoskeletal: Positive for myalgias.   Skin: Negative.    Allergic/Immunologic: Negative.    Neurological: Positive for numbness.   Hematological: Negative.    Psychiatric/Behavioral: Negative.    All other systems reviewed and are negative.      Objective  Vitals:    09/30/21 1013   BP: 144/92   Pulse: 70   SpO2: 95%   Weight: (!) 195 kg (430 lb)   Height: 180.3 cm (71\")     Body " mass index is 59.97 kg/m².    Physical Exam    Physical Exam  Vitals and nursing note reviewed.   Constitutional:       General: He is not in acute distress.     Appearance: Normal appearance. He is obese. He is not ill-appearing, toxic-appearing or diaphoretic.   HENT:      Head: Normocephalic and atraumatic.   Cardiovascular:      Rate and Rhythm: Normal rate and regular rhythm.      Pulses: Normal pulses.      Heart sounds: Normal heart sounds. No murmur heard.   No friction rub. No gallop.    Pulmonary:      Effort: Pulmonary effort is normal. No respiratory distress.      Breath sounds: Normal breath sounds. No stridor. No wheezing, rhonchi or rales.   Chest:      Chest wall: No tenderness.   Musculoskeletal:         General: Normal range of motion.      Cervical back: Normal range of motion and neck supple. No rigidity or tenderness.      Right lower leg: No edema.      Left lower leg: No edema.      Comments: Rt posterior thigh with pain on ambulation.   Lymphadenopathy:      Cervical: No cervical adenopathy.   Skin:     General: Skin is warm and dry.      Capillary Refill: Capillary refill takes less than 2 seconds.      Findings: No rash.   Neurological:      General: No focal deficit present.      Mental Status: He is alert and oriented to person, place, and time.      Gait: Gait abnormal (walks with walker).   Psychiatric:         Mood and Affect: Mood normal.         Behavior: Behavior normal.         Thought Content: Thought content normal.         Judgment: Judgment normal.             Diagnoses and all orders for this visit:    1. Type 2 diabetes mellitus with hyperglycemia, with long-term current use of insulin (HCC) (Primary)  -     insulin glargine (LANTUS, SEMGLEE) 100 UNIT/ML injection; Inject 80 Units under the skin into the appropriate area as directed Every Night. Please give 3 months supply  Dispense: 70 mL; Refill: 3  -     SITagliptin (Januvia) 100 MG tablet; Take 1 tablet by mouth Daily.   Dispense: 90 tablet; Refill: 3  -     Microlet Lancets misc; One touch delica lancets  Dispense: 150 each; Refill: 5    2. Hospital discharge follow-up    3. Hamstring strain, right, subsequent encounter  -     Ambulatory Referral to Home Health    4. Diabetic peripheral neuropathy associated with type 2 diabetes mellitus (HCC)    5. Insomnia, unspecified type  -     traZODone (DESYREL) 300 MG tablet; Take 1 tablet by mouth every night at bedtime.  Dispense: 90 tablet; Refill: 3    6. Coronary artery disease involving native coronary artery of native heart without angina pectoris  -     prasugrel (EFFIENT) 10 MG tablet; Take 1 tablet by mouth Daily.  Dispense: 90 tablet; Refill: 3  -     metoprolol succinate XL (TOPROL-XL) 50 MG 24 hr tablet; Take 1 tablet by mouth Daily.  Dispense: 90 tablet; Refill: 3    7. RLS (restless legs syndrome)  -     pramipexole (MIRAPEX) 1 MG tablet; Take 2 tablets by mouth Every Night.  Dispense: 180 tablet; Refill: 3    8. Essential hypertension  -     lisinopril (PRINIVIL,ZESTRIL) 40 MG tablet; Take 1 tablet by mouth Every Night.  Dispense: 90 tablet; Refill: 3    9. Hyperlipidemia, unspecified hyperlipidemia type  -     fenofibrate micronized (LOFIBRA) 134 MG capsule; Take 1 capsule by mouth Every Morning Before Breakfast.  Dispense: 90 capsule; Refill: 3  -     atorvastatin (LIPITOR) 80 MG tablet; Take 1 tablet by mouth Every Night.  Dispense: 90 tablet; Refill: 3  -     Evolocumab (Repatha SureClick) solution auto-injector SureClick injection; Inject 1 mL under the skin into the appropriate area as directed Every 14 (Fourteen) Days.  Dispense: 2 pen; Refill: 5    10. Muscle spasm    Other orders  -     ranolazine (RANEXA) 1000 MG 12 hr tablet; Take 1 tablet by mouth Every 12 (Twelve) Hours.  Dispense: 180 tablet; Refill: 3  -     Cancel: methocarbamol (Robaxin) 500 MG tablet; Take 1 tablet by mouth 4 (Four) Times a Day.  Dispense: 56 tablet; Refill: 3  -     Dulaglutide (Trulicity)  "1.5 MG/0.5ML solution pen-injector; Inject 1.5 mg under the skin into the appropriate area as directed 1 (One) Time Per Week.  Dispense: 5 pen; Refill: 5  -     Insulin Syringe-Needle U-100 25G X 1\" 1 ML misc; 1 syringe 4 (Four) Times a Day With Meals & at Bedtime.  Dispense: 200 each; Refill: 5  -     rivaroxaban (XARELTO) 20 MG tablet; Take 1 tablet by mouth Daily With Dinner.  Dispense: 90 tablet; Refill: 3       Patient to continue gabapentin for the neuropathy he has not due for refill today. Patient understands the risks associated with this controlled medication, including tolerance and addiction.  he also agrees to only obtain this medication from me, and not from a another provider, unless that provider is covering for me in my absence.  he also agrees to be compliant in dosing, and not self adjust the dose of medication.  A signed controlled substance agreement is on file, and he has received a controlled substance education sheet at this a previous visit.  he has also signed a consent for treatment with a controlled substance as per [facility] policy. CARY was obtained.  We will increase his Trulicity to 1.5 mg weekly.  Will increase Lantus to 80 units daily.  All other medications refilled as above.  Patient to follow-up in 3 months or sooner if needed for peripheral neuropathy appointment.  He is advised to keep his appointment next week with Dr. Benitez.  Answered all questions.  Patient verbalized understanding of plan of care.  Patient encouraged to get a diabetic eye exam.          This document has been electronically signed by LALA Munoz on September 30, 2021 10:59 CDT   "

## 2021-10-05 ENCOUNTER — DOCUMENTATION (OUTPATIENT)
Dept: ENDOCRINOLOGY | Facility: CLINIC | Age: 41
End: 2021-10-05

## 2021-10-07 ENCOUNTER — TELEPHONE (OUTPATIENT)
Dept: ENDOCRINOLOGY | Facility: CLINIC | Age: 41
End: 2021-10-07

## 2021-10-07 NOTE — TELEPHONE ENCOUNTER
Quin AGUILLON request repatha needs Clarification on Directions   There is a difference on directions/ needs verification     Spoke with Ligia

## 2021-10-11 DIAGNOSIS — Z79.4 TYPE 2 DIABETES MELLITUS WITH DIABETIC NEUROPATHY, WITH LONG-TERM CURRENT USE OF INSULIN (HCC): Chronic | ICD-10-CM

## 2021-10-11 DIAGNOSIS — E11.40 TYPE 2 DIABETES MELLITUS WITH DIABETIC NEUROPATHY, WITH LONG-TERM CURRENT USE OF INSULIN (HCC): Chronic | ICD-10-CM

## 2021-10-11 RX ORDER — GABAPENTIN 300 MG/1
600 CAPSULE ORAL 3 TIMES DAILY
Qty: 180 CAPSULE | Refills: 0 | Status: SHIPPED | OUTPATIENT
Start: 2021-10-11 | End: 2021-11-16 | Stop reason: SDUPTHER

## 2021-11-16 DIAGNOSIS — E11.40 TYPE 2 DIABETES MELLITUS WITH DIABETIC NEUROPATHY, WITH LONG-TERM CURRENT USE OF INSULIN (HCC): Chronic | ICD-10-CM

## 2021-11-16 DIAGNOSIS — Z79.4 TYPE 2 DIABETES MELLITUS WITH DIABETIC NEUROPATHY, WITH LONG-TERM CURRENT USE OF INSULIN (HCC): Chronic | ICD-10-CM

## 2021-11-16 RX ORDER — GABAPENTIN 300 MG/1
600 CAPSULE ORAL 3 TIMES DAILY
Qty: 180 CAPSULE | Refills: 0 | Status: SHIPPED | OUTPATIENT
Start: 2021-11-16 | End: 2021-12-21 | Stop reason: SDUPTHER

## 2021-12-21 DIAGNOSIS — Z79.4 TYPE 2 DIABETES MELLITUS WITH DIABETIC NEUROPATHY, WITH LONG-TERM CURRENT USE OF INSULIN (HCC): Chronic | ICD-10-CM

## 2021-12-21 DIAGNOSIS — E11.40 TYPE 2 DIABETES MELLITUS WITH DIABETIC NEUROPATHY, WITH LONG-TERM CURRENT USE OF INSULIN (HCC): Chronic | ICD-10-CM

## 2021-12-21 RX ORDER — GABAPENTIN 300 MG/1
600 CAPSULE ORAL 3 TIMES DAILY
Qty: 180 CAPSULE | Refills: 0 | Status: SHIPPED | OUTPATIENT
Start: 2021-12-21 | End: 2022-01-17 | Stop reason: SDUPTHER

## 2021-12-29 ENCOUNTER — APPOINTMENT (OUTPATIENT)
Dept: GENERAL RADIOLOGY | Facility: HOSPITAL | Age: 41
End: 2021-12-29

## 2021-12-29 ENCOUNTER — HOSPITAL ENCOUNTER (EMERGENCY)
Facility: HOSPITAL | Age: 41
Discharge: HOME | End: 2021-12-29
Attending: STUDENT IN AN ORGANIZED HEALTH CARE EDUCATION/TRAINING PROGRAM | Admitting: STUDENT IN AN ORGANIZED HEALTH CARE EDUCATION/TRAINING PROGRAM

## 2021-12-29 VITALS
RESPIRATION RATE: 20 BRPM | OXYGEN SATURATION: 96 % | HEART RATE: 100 BPM | TEMPERATURE: 98 F | DIASTOLIC BLOOD PRESSURE: 84 MMHG | SYSTOLIC BLOOD PRESSURE: 175 MMHG

## 2021-12-29 DIAGNOSIS — L08.9 FINGER INFECTION: ICD-10-CM

## 2021-12-29 DIAGNOSIS — B34.9 VIRAL ILLNESS: Primary | ICD-10-CM

## 2021-12-29 LAB
ALBUMIN SERPL-MCNC: 3.3 G/DL (ref 3.5–5.2)
ALBUMIN/GLOB SERPL: 1 G/DL
ALP SERPL-CCNC: 77 U/L (ref 39–117)
ALT SERPL W P-5'-P-CCNC: 25 U/L (ref 1–41)
ANION GAP SERPL CALCULATED.3IONS-SCNC: 8 MMOL/L (ref 5–15)
AST SERPL-CCNC: 22 U/L (ref 1–40)
B PARAPERT DNA SPEC QL NAA+PROBE: NOT DETECTED
B PERT DNA SPEC QL NAA+PROBE: NOT DETECTED
BASOPHILS # BLD AUTO: 0.05 10*3/MM3 (ref 0–0.2)
BASOPHILS NFR BLD AUTO: 0.7 % (ref 0–1.5)
BILIRUB SERPL-MCNC: 0.4 MG/DL (ref 0–1.2)
BUN SERPL-MCNC: 8 MG/DL (ref 6–20)
BUN/CREAT SERPL: 12.7 (ref 7–25)
C PNEUM DNA NPH QL NAA+NON-PROBE: NOT DETECTED
CALCIUM SPEC-SCNC: 8.6 MG/DL (ref 8.6–10.5)
CHLORIDE SERPL-SCNC: 98 MMOL/L (ref 98–107)
CO2 SERPL-SCNC: 26 MMOL/L (ref 22–29)
CREAT SERPL-MCNC: 0.63 MG/DL (ref 0.76–1.27)
D-DIMER, QUANTITATIVE (MAD,POW, STR): 287 NG/ML (FEU) (ref 0–470)
DEPRECATED RDW RBC AUTO: 40.5 FL (ref 37–54)
EOSINOPHIL # BLD AUTO: 0.28 10*3/MM3 (ref 0–0.4)
EOSINOPHIL NFR BLD AUTO: 4.1 % (ref 0.3–6.2)
ERYTHROCYTE [DISTWIDTH] IN BLOOD BY AUTOMATED COUNT: 13.9 % (ref 12.3–15.4)
FLUAV SUBTYP SPEC NAA+PROBE: NOT DETECTED
FLUBV RNA ISLT QL NAA+PROBE: NOT DETECTED
GFR SERPL CREATININE-BSD FRML MDRD: 139 ML/MIN/1.73
GLOBULIN UR ELPH-MCNC: 3.4 GM/DL
GLUCOSE BLDC GLUCOMTR-MCNC: 297 MG/DL (ref 70–130)
GLUCOSE SERPL-MCNC: 345 MG/DL (ref 65–99)
HADV DNA SPEC NAA+PROBE: NOT DETECTED
HCOV 229E RNA SPEC QL NAA+PROBE: NOT DETECTED
HCOV HKU1 RNA SPEC QL NAA+PROBE: NOT DETECTED
HCOV NL63 RNA SPEC QL NAA+PROBE: NOT DETECTED
HCOV OC43 RNA SPEC QL NAA+PROBE: NOT DETECTED
HCT VFR BLD AUTO: 40.1 % (ref 37.5–51)
HGB BLD-MCNC: 14 G/DL (ref 13–17.7)
HMPV RNA NPH QL NAA+NON-PROBE: NOT DETECTED
HPIV1 RNA ISLT QL NAA+PROBE: NOT DETECTED
HPIV2 RNA SPEC QL NAA+PROBE: NOT DETECTED
HPIV3 RNA NPH QL NAA+PROBE: NOT DETECTED
HPIV4 P GENE NPH QL NAA+PROBE: NOT DETECTED
IMM GRANULOCYTES # BLD AUTO: 0.13 10*3/MM3 (ref 0–0.05)
IMM GRANULOCYTES NFR BLD AUTO: 1.9 % (ref 0–0.5)
LYMPHOCYTES # BLD AUTO: 1.69 10*3/MM3 (ref 0.7–3.1)
LYMPHOCYTES NFR BLD AUTO: 24.9 % (ref 19.6–45.3)
M PNEUMO IGG SER IA-ACNC: NOT DETECTED
MCH RBC QN AUTO: 28.3 PG (ref 26.6–33)
MCHC RBC AUTO-ENTMCNC: 34.9 G/DL (ref 31.5–35.7)
MCV RBC AUTO: 81.2 FL (ref 79–97)
MONOCYTES # BLD AUTO: 0.59 10*3/MM3 (ref 0.1–0.9)
MONOCYTES NFR BLD AUTO: 8.7 % (ref 5–12)
NEUTROPHILS NFR BLD AUTO: 4.06 10*3/MM3 (ref 1.7–7)
NEUTROPHILS NFR BLD AUTO: 59.7 % (ref 42.7–76)
NRBC BLD AUTO-RTO: 0 /100 WBC (ref 0–0.2)
NT-PROBNP SERPL-MCNC: 24.5 PG/ML (ref 0–450)
PLATELET # BLD AUTO: 174 10*3/MM3 (ref 140–450)
PMV BLD AUTO: 8.5 FL (ref 6–12)
POTASSIUM SERPL-SCNC: 3.9 MMOL/L (ref 3.5–5.2)
PROCALCITONIN SERPL-MCNC: 0.07 NG/ML (ref 0–0.25)
PROT SERPL-MCNC: 6.7 G/DL (ref 6–8.5)
RBC # BLD AUTO: 4.94 10*6/MM3 (ref 4.14–5.8)
RHINOVIRUS RNA SPEC NAA+PROBE: DETECTED
RSV RNA NPH QL NAA+NON-PROBE: NOT DETECTED
SARS-COV-2 RNA NPH QL NAA+NON-PROBE: NOT DETECTED
SODIUM SERPL-SCNC: 132 MMOL/L (ref 136–145)
TROPONIN T SERPL-MCNC: <0.01 NG/ML (ref 0–0.03)
TROPONIN T SERPL-MCNC: <0.01 NG/ML (ref 0–0.03)
WBC NRBC COR # BLD: 6.8 10*3/MM3 (ref 3.4–10.8)

## 2021-12-29 PROCEDURE — 85025 COMPLETE CBC W/AUTO DIFF WBC: CPT | Performed by: NURSE PRACTITIONER

## 2021-12-29 PROCEDURE — 93005 ELECTROCARDIOGRAM TRACING: CPT | Performed by: NURSE PRACTITIONER

## 2021-12-29 PROCEDURE — 80053 COMPREHEN METABOLIC PANEL: CPT | Performed by: NURSE PRACTITIONER

## 2021-12-29 PROCEDURE — 83880 ASSAY OF NATRIURETIC PEPTIDE: CPT | Performed by: NURSE PRACTITIONER

## 2021-12-29 PROCEDURE — 85379 FIBRIN DEGRADATION QUANT: CPT | Performed by: NURSE PRACTITIONER

## 2021-12-29 PROCEDURE — 84484 ASSAY OF TROPONIN QUANT: CPT | Performed by: NURSE PRACTITIONER

## 2021-12-29 PROCEDURE — 82962 GLUCOSE BLOOD TEST: CPT

## 2021-12-29 PROCEDURE — 93010 ELECTROCARDIOGRAM REPORT: CPT | Performed by: INTERNAL MEDICINE

## 2021-12-29 PROCEDURE — 94640 AIRWAY INHALATION TREATMENT: CPT

## 2021-12-29 PROCEDURE — 0202U NFCT DS 22 TRGT SARS-COV-2: CPT | Performed by: NURSE PRACTITIONER

## 2021-12-29 PROCEDURE — 93005 ELECTROCARDIOGRAM TRACING: CPT | Performed by: STUDENT IN AN ORGANIZED HEALTH CARE EDUCATION/TRAINING PROGRAM

## 2021-12-29 PROCEDURE — 84145 PROCALCITONIN (PCT): CPT | Performed by: NURSE PRACTITIONER

## 2021-12-29 PROCEDURE — 71045 X-RAY EXAM CHEST 1 VIEW: CPT

## 2021-12-29 PROCEDURE — 94760 N-INVAS EAR/PLS OXIMETRY 1: CPT

## 2021-12-29 PROCEDURE — 99284 EMERGENCY DEPT VISIT MOD MDM: CPT

## 2021-12-29 RX ORDER — DOXYCYCLINE 100 MG/1
100 CAPSULE ORAL 2 TIMES DAILY
Qty: 14 CAPSULE | Refills: 0 | Status: SHIPPED | OUTPATIENT
Start: 2021-12-29 | End: 2022-01-05

## 2021-12-29 RX ORDER — ALBUTEROL SULFATE 90 UG/1
2 AEROSOL, METERED RESPIRATORY (INHALATION) EVERY 6 HOURS PRN
Qty: 18 G | Refills: 0 | Status: SHIPPED | OUTPATIENT
Start: 2021-12-29

## 2021-12-29 RX ORDER — SODIUM CHLORIDE 0.9 % (FLUSH) 0.9 %
10 SYRINGE (ML) INJECTION AS NEEDED
Status: DISCONTINUED | OUTPATIENT
Start: 2021-12-29 | End: 2021-12-29 | Stop reason: HOSPADM

## 2021-12-29 RX ORDER — DOXYCYCLINE 100 MG/1
100 CAPSULE ORAL ONCE
Status: COMPLETED | OUTPATIENT
Start: 2021-12-29 | End: 2021-12-29

## 2021-12-29 RX ORDER — IPRATROPIUM BROMIDE AND ALBUTEROL SULFATE 2.5; .5 MG/3ML; MG/3ML
3 SOLUTION RESPIRATORY (INHALATION) ONCE
Status: COMPLETED | OUTPATIENT
Start: 2021-12-29 | End: 2021-12-29

## 2021-12-29 RX ADMIN — SODIUM CHLORIDE 500 ML: 9 INJECTION, SOLUTION INTRAVENOUS at 18:59

## 2021-12-29 RX ADMIN — DOXYCYCLINE 100 MG: 100 CAPSULE ORAL at 20:34

## 2021-12-29 RX ADMIN — IPRATROPIUM BROMIDE AND ALBUTEROL SULFATE 3 ML: .5; 3 SOLUTION RESPIRATORY (INHALATION) at 18:51

## 2021-12-30 LAB
QT INTERVAL: 384 MS
QTC INTERVAL: 453 MS

## 2022-01-03 ENCOUNTER — PATIENT OUTREACH (OUTPATIENT)
Dept: CASE MANAGEMENT | Facility: OTHER | Age: 42
End: 2022-01-03

## 2022-01-03 NOTE — OUTREACH NOTE
Ambulatory Case Management Note    Patient Outreach    Isabel ROQUE patient seen at [facility] ED 12.29.21 for viral illness and finger infection. Spoke with patient. He was still in bed and sounded like he was in pain. He reports his back and legs are hurting today. He reports his ED symptoms have improved. He is agreeable to follow up next week.         Bella Julian RN  Ambulatory Case Management    1/3/2022, 11:18 CST

## 2022-01-11 ENCOUNTER — PATIENT OUTREACH (OUTPATIENT)
Dept: CASE MANAGEMENT | Facility: OTHER | Age: 42
End: 2022-01-11

## 2022-01-11 NOTE — OUTREACH NOTE
"Ambulatory Case Management Note    Patient Outreach    HRCM follow up with patient. He reports his finger has continued to improve and he has completed the doxycycline. Discussed his blood sugar and he reports they are in the 200 range. He states compliance with medications. Reviewed his diet and he \"try not to splurge\". ACM offered further assistance/education for his diabetes but he declined.     General & Health Literacy Assessment    Questions/Answers      Most Recent Value   Assessment Completed With Patient   Living Arrangement Alone   Type of Residence Private Residence   Home Care Services No   Equiptment Used at Home Walker  [glucometer, CPAP]   Communication Device Yes   Bed or Wheelchair Confined No   Difficulty Keeping Appointments No        Care Evaluation    Questions/Answers      Most Recent Value   Annual Wellness Visit:  Patient Has Completed  [completed 7.6.21]   Care Gaps Addressed Diabetic A1C,  Diabetic Eye Exam,  Flu Shot,  Pneumonia Vaccine   HbA1c Status Up to Date-outside defined limits   HbA1c Completion at [facility] or Other [facility]   HbA1c Comments 10.5 on 9.18.21   Diabetic Eye Exam Status Up to Date   Diabetic Eye Exam Comments reports completed in 2021   Flu Shot Status Up to Date   Flu Shot Completion at [facility] or Other Other   Other Location completed 9.24.21   Pneumonia Vaccine Status Up to Date   Pneumonia Vaccine Completion at [facility] or Other [facility]   Pneumonia Vaccine Comments completed [facility] 23 7.6.21   Other Patient Education/Resources  --  [provided ACM contact information]   Healthy Lifestyle (Self-Efficacy) self-reports important symptoms to medical professional        SDOH updated and reviewed with the patient during this program:    Sent via [employer].        Bella Julian RN  Ambulatory Case Management    1/11/2022, 14:05 CST    "

## 2022-01-12 ENCOUNTER — PATIENT OUTREACH (OUTPATIENT)
Dept: CASE MANAGEMENT | Facility: OTHER | Age: 42
End: 2022-01-12

## 2022-01-14 ENCOUNTER — HOSPITAL ENCOUNTER (OUTPATIENT)
Facility: HOSPITAL | Age: 42
Setting detail: OBSERVATION
Discharge: HOME | End: 2022-01-15
Attending: EMERGENCY MEDICINE | Admitting: INTERNAL MEDICINE

## 2022-01-14 DIAGNOSIS — R73.9 HYPERGLYCEMIA: ICD-10-CM

## 2022-01-14 DIAGNOSIS — R07.9 CHEST PAIN, UNSPECIFIED TYPE: Primary | ICD-10-CM

## 2022-01-14 DIAGNOSIS — I10 HYPERTENSION, UNSPECIFIED TYPE: ICD-10-CM

## 2022-01-14 PROCEDURE — 99285 EMERGENCY DEPT VISIT HI MDM: CPT

## 2022-01-14 PROCEDURE — 93010 ELECTROCARDIOGRAM REPORT: CPT | Performed by: INTERNAL MEDICINE

## 2022-01-14 PROCEDURE — 93005 ELECTROCARDIOGRAM TRACING: CPT | Performed by: EMERGENCY MEDICINE

## 2022-01-14 PROCEDURE — 93005 ELECTROCARDIOGRAM TRACING: CPT

## 2022-01-14 RX ORDER — NITROGLYCERIN 0.4 MG/1
0.4 TABLET SUBLINGUAL
Status: DISCONTINUED | OUTPATIENT
Start: 2022-01-14 | End: 2022-01-15

## 2022-01-14 RX ORDER — ASPIRIN 81 MG/1
81 TABLET, CHEWABLE ORAL ONCE
Status: COMPLETED | OUTPATIENT
Start: 2022-01-14 | End: 2022-01-14

## 2022-01-14 RX ORDER — SODIUM CHLORIDE 0.9 % (FLUSH) 0.9 %
10 SYRINGE (ML) INJECTION AS NEEDED
Status: DISCONTINUED | OUTPATIENT
Start: 2022-01-14 | End: 2022-01-15 | Stop reason: HOSPADM

## 2022-01-14 RX ADMIN — ASPIRIN 81 MG: 81 TABLET, CHEWABLE ORAL at 23:45

## 2022-01-15 ENCOUNTER — APPOINTMENT (OUTPATIENT)
Dept: CT IMAGING | Facility: HOSPITAL | Age: 42
End: 2022-01-15

## 2022-01-15 ENCOUNTER — READMISSION MANAGEMENT (OUTPATIENT)
Dept: CALL CENTER | Facility: HOSPITAL | Age: 42
End: 2022-01-15

## 2022-01-15 VITALS
HEIGHT: 71 IN | BODY MASS INDEX: 44.1 KG/M2 | RESPIRATION RATE: 18 BRPM | OXYGEN SATURATION: 93 % | DIASTOLIC BLOOD PRESSURE: 59 MMHG | WEIGHT: 315 LBS | HEART RATE: 72 BPM | TEMPERATURE: 97 F | SYSTOLIC BLOOD PRESSURE: 123 MMHG

## 2022-01-15 LAB
ALBUMIN SERPL-MCNC: 3.2 G/DL (ref 3.5–5.2)
ALBUMIN/GLOB SERPL: 0.9 G/DL
ALP SERPL-CCNC: 84 U/L (ref 39–117)
ALT SERPL W P-5'-P-CCNC: 36 U/L (ref 1–41)
ANION GAP SERPL CALCULATED.3IONS-SCNC: 15 MMOL/L (ref 5–15)
ARTERIAL PATENCY WRIST A: ABNORMAL
AST SERPL-CCNC: 22 U/L (ref 1–40)
ATMOSPHERIC PRESS: 748 MMHG
BASE EXCESS BLDA CALC-SCNC: 1.5 MMOL/L (ref 0–2)
BASOPHILS # BLD AUTO: 0.04 10*3/MM3 (ref 0–0.2)
BASOPHILS NFR BLD AUTO: 0.5 % (ref 0–1.5)
BDY SITE: ABNORMAL
BILIRUB SERPL-MCNC: 0.2 MG/DL (ref 0–1.2)
BUN SERPL-MCNC: 9 MG/DL (ref 6–20)
BUN/CREAT SERPL: 10.7 (ref 7–25)
CALCIUM SPEC-SCNC: 8.8 MG/DL (ref 8.6–10.5)
CHLORIDE SERPL-SCNC: 94 MMOL/L (ref 98–107)
CO2 SERPL-SCNC: 25 MMOL/L (ref 22–29)
CREAT SERPL-MCNC: 0.84 MG/DL (ref 0.76–1.27)
DEPRECATED RDW RBC AUTO: 40 FL (ref 37–54)
EOSINOPHIL # BLD AUTO: 0.17 10*3/MM3 (ref 0–0.4)
EOSINOPHIL NFR BLD AUTO: 2.2 % (ref 0.3–6.2)
ERYTHROCYTE [DISTWIDTH] IN BLOOD BY AUTOMATED COUNT: 13.9 % (ref 12.3–15.4)
FLUAV SUBTYP SPEC NAA+PROBE: NOT DETECTED
FLUBV RNA ISLT QL NAA+PROBE: NOT DETECTED
GFR SERPL CREATININE-BSD FRML MDRD: 100 ML/MIN/1.73
GLOBULIN UR ELPH-MCNC: 3.5 GM/DL
GLUCOSE BLDC GLUCOMTR-MCNC: 346 MG/DL (ref 70–130)
GLUCOSE BLDC GLUCOMTR-MCNC: 355 MG/DL (ref 70–130)
GLUCOSE BLDC GLUCOMTR-MCNC: 389 MG/DL (ref 70–130)
GLUCOSE SERPL-MCNC: 446 MG/DL (ref 65–99)
HBA1C MFR BLD: 12.2 % (ref 4.8–5.6)
HCO3 BLDA-SCNC: 28.1 MMOL/L (ref 20–26)
HCT VFR BLD AUTO: 41.4 % (ref 37.5–51)
HGB BLD-MCNC: 14.8 G/DL (ref 13–17.7)
HOLD SPECIMEN: NORMAL
IMM GRANULOCYTES # BLD AUTO: 0.12 10*3/MM3 (ref 0–0.05)
IMM GRANULOCYTES NFR BLD AUTO: 1.5 % (ref 0–0.5)
INHALED O2 CONCENTRATION: 21 %
LYMPHOCYTES # BLD AUTO: 2.17 10*3/MM3 (ref 0.7–3.1)
LYMPHOCYTES NFR BLD AUTO: 27.6 % (ref 19.6–45.3)
Lab: ABNORMAL
MCH RBC QN AUTO: 28.7 PG (ref 26.6–33)
MCHC RBC AUTO-ENTMCNC: 35.7 G/DL (ref 31.5–35.7)
MCV RBC AUTO: 80.2 FL (ref 79–97)
MODALITY: ABNORMAL
MONOCYTES # BLD AUTO: 0.6 10*3/MM3 (ref 0.1–0.9)
MONOCYTES NFR BLD AUTO: 7.6 % (ref 5–12)
NEUTROPHILS NFR BLD AUTO: 4.77 10*3/MM3 (ref 1.7–7)
NEUTROPHILS NFR BLD AUTO: 60.6 % (ref 42.7–76)
NRBC BLD AUTO-RTO: 0 /100 WBC (ref 0–0.2)
NT-PROBNP SERPL-MCNC: 15.6 PG/ML (ref 0–450)
PCO2 BLDA: 51.2 MM HG (ref 35–45)
PH BLDA: 7.35 PH UNITS (ref 7.35–7.45)
PLATELET # BLD AUTO: 197 10*3/MM3 (ref 140–450)
PMV BLD AUTO: 8.8 FL (ref 6–12)
PO2 BLDA: 76.7 MM HG (ref 83–108)
POTASSIUM SERPL-SCNC: 3.9 MMOL/L (ref 3.5–5.2)
PROCALCITONIN SERPL-MCNC: 0.06 NG/ML (ref 0–0.25)
PROT SERPL-MCNC: 6.7 G/DL (ref 6–8.5)
QT INTERVAL: 330 MS
QT INTERVAL: 402 MS
QTC INTERVAL: 438 MS
QTC INTERVAL: 499 MS
RBC # BLD AUTO: 5.16 10*6/MM3 (ref 4.14–5.8)
SAO2 % BLDCOA: 94.9 % (ref 94–99)
SARS-COV-2 RNA PNL SPEC NAA+PROBE: NOT DETECTED
SODIUM SERPL-SCNC: 134 MMOL/L (ref 136–145)
TROPONIN T SERPL-MCNC: <0.01 NG/ML (ref 0–0.03)
TROPONIN T SERPL-MCNC: <0.01 NG/ML (ref 0–0.03)
VENTILATOR MODE: ABNORMAL
WBC NRBC COR # BLD: 7.87 10*3/MM3 (ref 3.4–10.8)
WHOLE BLOOD HOLD SPECIMEN: NORMAL

## 2022-01-15 PROCEDURE — 63710000001 INSULIN REGULAR HUMAN PER 5 UNITS: Performed by: EMERGENCY MEDICINE

## 2022-01-15 PROCEDURE — 70450 CT HEAD/BRAIN W/O DYE: CPT

## 2022-01-15 PROCEDURE — 84145 PROCALCITONIN (PCT): CPT | Performed by: STUDENT IN AN ORGANIZED HEALTH CARE EDUCATION/TRAINING PROGRAM

## 2022-01-15 PROCEDURE — 83036 HEMOGLOBIN GLYCOSYLATED A1C: CPT | Performed by: STUDENT IN AN ORGANIZED HEALTH CARE EDUCATION/TRAINING PROGRAM

## 2022-01-15 PROCEDURE — 93010 ELECTROCARDIOGRAM REPORT: CPT | Performed by: INTERNAL MEDICINE

## 2022-01-15 PROCEDURE — 82962 GLUCOSE BLOOD TEST: CPT

## 2022-01-15 PROCEDURE — 84484 ASSAY OF TROPONIN QUANT: CPT | Performed by: STUDENT IN AN ORGANIZED HEALTH CARE EDUCATION/TRAINING PROGRAM

## 2022-01-15 PROCEDURE — 94640 AIRWAY INHALATION TREATMENT: CPT

## 2022-01-15 PROCEDURE — 0 IOPAMIDOL PER 1 ML: Performed by: EMERGENCY MEDICINE

## 2022-01-15 PROCEDURE — 84484 ASSAY OF TROPONIN QUANT: CPT | Performed by: EMERGENCY MEDICINE

## 2022-01-15 PROCEDURE — G0378 HOSPITAL OBSERVATION PER HR: HCPCS

## 2022-01-15 PROCEDURE — 93005 ELECTROCARDIOGRAM TRACING: CPT | Performed by: STUDENT IN AN ORGANIZED HEALTH CARE EDUCATION/TRAINING PROGRAM

## 2022-01-15 PROCEDURE — 63710000001 INSULIN DETEMIR PER 5 UNITS: Performed by: STUDENT IN AN ORGANIZED HEALTH CARE EDUCATION/TRAINING PROGRAM

## 2022-01-15 PROCEDURE — 83880 ASSAY OF NATRIURETIC PEPTIDE: CPT | Performed by: EMERGENCY MEDICINE

## 2022-01-15 PROCEDURE — 25010000002 MORPHINE PER 10 MG: Performed by: STUDENT IN AN ORGANIZED HEALTH CARE EDUCATION/TRAINING PROGRAM

## 2022-01-15 PROCEDURE — 87636 SARSCOV2 & INF A&B AMP PRB: CPT | Performed by: EMERGENCY MEDICINE

## 2022-01-15 PROCEDURE — 71275 CT ANGIOGRAPHY CHEST: CPT

## 2022-01-15 PROCEDURE — 63710000001 INSULIN ASPART PER 5 UNITS: Performed by: STUDENT IN AN ORGANIZED HEALTH CARE EDUCATION/TRAINING PROGRAM

## 2022-01-15 PROCEDURE — 94760 N-INVAS EAR/PLS OXIMETRY 1: CPT

## 2022-01-15 PROCEDURE — 80053 COMPREHEN METABOLIC PANEL: CPT | Performed by: EMERGENCY MEDICINE

## 2022-01-15 PROCEDURE — 96374 THER/PROPH/DIAG INJ IV PUSH: CPT

## 2022-01-15 PROCEDURE — 36600 WITHDRAWAL OF ARTERIAL BLOOD: CPT

## 2022-01-15 PROCEDURE — 96375 TX/PRO/DX INJ NEW DRUG ADDON: CPT

## 2022-01-15 PROCEDURE — 85025 COMPLETE CBC W/AUTO DIFF WBC: CPT | Performed by: EMERGENCY MEDICINE

## 2022-01-15 PROCEDURE — 82803 BLOOD GASES ANY COMBINATION: CPT

## 2022-01-15 RX ORDER — TRAZODONE HYDROCHLORIDE 100 MG/1
300 TABLET ORAL NIGHTLY
Status: DISCONTINUED | OUTPATIENT
Start: 2022-01-15 | End: 2022-01-15 | Stop reason: HOSPADM

## 2022-01-15 RX ORDER — AMLODIPINE BESYLATE 10 MG/1
10 TABLET ORAL
Status: DISCONTINUED | OUTPATIENT
Start: 2022-01-15 | End: 2022-01-15

## 2022-01-15 RX ORDER — ISOSORBIDE MONONITRATE 60 MG/1
120 TABLET, EXTENDED RELEASE ORAL
Status: DISCONTINUED | OUTPATIENT
Start: 2022-01-15 | End: 2022-01-15 | Stop reason: HOSPADM

## 2022-01-15 RX ORDER — PANTOPRAZOLE SODIUM 40 MG/1
40 TABLET, DELAYED RELEASE ORAL NIGHTLY
Status: DISCONTINUED | OUTPATIENT
Start: 2022-01-15 | End: 2022-01-15 | Stop reason: HOSPADM

## 2022-01-15 RX ORDER — DULOXETIN HYDROCHLORIDE 30 MG/1
30 CAPSULE, DELAYED RELEASE ORAL DAILY
Status: DISCONTINUED | OUTPATIENT
Start: 2022-01-15 | End: 2022-01-15 | Stop reason: HOSPADM

## 2022-01-15 RX ORDER — DEXTROSE MONOHYDRATE 25 G/50ML
25 INJECTION, SOLUTION INTRAVENOUS
Status: DISCONTINUED | OUTPATIENT
Start: 2022-01-15 | End: 2022-01-15 | Stop reason: HOSPADM

## 2022-01-15 RX ORDER — LABETALOL HYDROCHLORIDE 5 MG/ML
20 INJECTION, SOLUTION INTRAVENOUS ONCE
Status: COMPLETED | OUTPATIENT
Start: 2022-01-15 | End: 2022-01-15

## 2022-01-15 RX ORDER — LISINOPRIL 40 MG/1
40 TABLET ORAL NIGHTLY
Status: DISCONTINUED | OUTPATIENT
Start: 2022-01-15 | End: 2022-01-15 | Stop reason: HOSPADM

## 2022-01-15 RX ORDER — METOPROLOL SUCCINATE 50 MG/1
50 TABLET, EXTENDED RELEASE ORAL DAILY
Status: DISCONTINUED | OUTPATIENT
Start: 2022-01-15 | End: 2022-01-15

## 2022-01-15 RX ORDER — METOPROLOL SUCCINATE 50 MG/1
100 TABLET, EXTENDED RELEASE ORAL DAILY
Status: DISCONTINUED | OUTPATIENT
Start: 2022-01-15 | End: 2022-01-15 | Stop reason: HOSPADM

## 2022-01-15 RX ORDER — METOPROLOL SUCCINATE 100 MG/1
100 TABLET, EXTENDED RELEASE ORAL DAILY
Qty: 30 TABLET | Refills: 0 | Status: SHIPPED | OUTPATIENT
Start: 2022-01-16 | End: 2022-07-26

## 2022-01-15 RX ORDER — RANOLAZINE 500 MG/1
1000 TABLET, EXTENDED RELEASE ORAL EVERY 12 HOURS SCHEDULED
Status: DISCONTINUED | OUTPATIENT
Start: 2022-01-15 | End: 2022-01-15 | Stop reason: HOSPADM

## 2022-01-15 RX ORDER — ATORVASTATIN CALCIUM 40 MG/1
80 TABLET, FILM COATED ORAL NIGHTLY
Status: DISCONTINUED | OUTPATIENT
Start: 2022-01-15 | End: 2022-01-15 | Stop reason: HOSPADM

## 2022-01-15 RX ORDER — DILTIAZEM HYDROCHLORIDE 120 MG/1
120 CAPSULE, COATED, EXTENDED RELEASE ORAL DAILY
Status: DISCONTINUED | OUTPATIENT
Start: 2022-01-15 | End: 2022-01-15 | Stop reason: HOSPADM

## 2022-01-15 RX ORDER — SODIUM CHLORIDE 0.9 % (FLUSH) 0.9 %
10 SYRINGE (ML) INJECTION EVERY 12 HOURS SCHEDULED
Status: DISCONTINUED | OUTPATIENT
Start: 2022-01-15 | End: 2022-01-15 | Stop reason: HOSPADM

## 2022-01-15 RX ORDER — FENOFIBRATE 145 MG/1
145 TABLET, COATED ORAL DAILY
Refills: 3 | Status: DISCONTINUED | OUTPATIENT
Start: 2022-01-15 | End: 2022-01-15 | Stop reason: HOSPADM

## 2022-01-15 RX ORDER — AMLODIPINE BESYLATE 5 MG/1
5 TABLET ORAL
Status: DISCONTINUED | OUTPATIENT
Start: 2022-01-15 | End: 2022-01-15

## 2022-01-15 RX ORDER — LABETALOL HYDROCHLORIDE 5 MG/ML
20 INJECTION, SOLUTION INTRAVENOUS ONCE
Status: DISCONTINUED | OUTPATIENT
Start: 2022-01-15 | End: 2022-01-15

## 2022-01-15 RX ORDER — GABAPENTIN 300 MG/1
600 CAPSULE ORAL 3 TIMES DAILY
Status: DISCONTINUED | OUTPATIENT
Start: 2022-01-15 | End: 2022-01-15 | Stop reason: HOSPADM

## 2022-01-15 RX ORDER — METHOCARBAMOL 500 MG/1
500 TABLET, FILM COATED ORAL 4 TIMES DAILY
Status: DISCONTINUED | OUTPATIENT
Start: 2022-01-15 | End: 2022-01-15 | Stop reason: HOSPADM

## 2022-01-15 RX ORDER — DULAGLUTIDE 1.5 MG/.5ML
1.5 INJECTION, SOLUTION SUBCUTANEOUS WEEKLY
Qty: 5 PEN | Refills: 5 | Status: SHIPPED | OUTPATIENT
Start: 2022-01-15 | End: 2022-01-17 | Stop reason: DRUGHIGH

## 2022-01-15 RX ORDER — MORPHINE SULFATE 2 MG/ML
1 INJECTION, SOLUTION INTRAMUSCULAR; INTRAVENOUS EVERY 4 HOURS PRN
Status: DISCONTINUED | OUTPATIENT
Start: 2022-01-15 | End: 2022-01-15 | Stop reason: HOSPADM

## 2022-01-15 RX ORDER — SODIUM CHLORIDE 0.9 % (FLUSH) 0.9 %
10 SYRINGE (ML) INJECTION AS NEEDED
Status: DISCONTINUED | OUTPATIENT
Start: 2022-01-15 | End: 2022-01-15 | Stop reason: HOSPADM

## 2022-01-15 RX ORDER — ALBUTEROL SULFATE 2.5 MG/3ML
2.5 SOLUTION RESPIRATORY (INHALATION) EVERY 6 HOURS PRN
Status: DISCONTINUED | OUTPATIENT
Start: 2022-01-15 | End: 2022-01-15 | Stop reason: HOSPADM

## 2022-01-15 RX ORDER — NALOXONE HCL 0.4 MG/ML
0.4 VIAL (ML) INJECTION
Status: DISCONTINUED | OUTPATIENT
Start: 2022-01-15 | End: 2022-01-15 | Stop reason: HOSPADM

## 2022-01-15 RX ORDER — PRASUGREL 10 MG/1
10 TABLET, FILM COATED ORAL DAILY
Status: DISCONTINUED | OUTPATIENT
Start: 2022-01-15 | End: 2022-01-15 | Stop reason: HOSPADM

## 2022-01-15 RX ORDER — NITROGLYCERIN 0.4 MG/1
0.4 TABLET SUBLINGUAL
Status: DISCONTINUED | OUTPATIENT
Start: 2022-01-15 | End: 2022-01-15 | Stop reason: HOSPADM

## 2022-01-15 RX ORDER — IPRATROPIUM BROMIDE AND ALBUTEROL SULFATE 2.5; .5 MG/3ML; MG/3ML
3 SOLUTION RESPIRATORY (INHALATION)
Status: DISCONTINUED | OUTPATIENT
Start: 2022-01-15 | End: 2022-01-15 | Stop reason: HOSPADM

## 2022-01-15 RX ORDER — NICOTINE POLACRILEX 4 MG
15 LOZENGE BUCCAL
Status: DISCONTINUED | OUTPATIENT
Start: 2022-01-15 | End: 2022-01-15 | Stop reason: HOSPADM

## 2022-01-15 RX ADMIN — INSULIN ASPART 20 UNITS: 100 INJECTION, SOLUTION INTRAVENOUS; SUBCUTANEOUS at 09:11

## 2022-01-15 RX ADMIN — INSULIN DETEMIR 30 UNITS: 100 INJECTION, SOLUTION SUBCUTANEOUS at 05:12

## 2022-01-15 RX ADMIN — IPRATROPIUM BROMIDE AND ALBUTEROL SULFATE 3 ML: 2.5; .5 SOLUTION RESPIRATORY (INHALATION) at 07:13

## 2022-01-15 RX ADMIN — HUMAN INSULIN 6 UNITS: 100 INJECTION, SOLUTION SUBCUTANEOUS at 01:19

## 2022-01-15 RX ADMIN — ISOSORBIDE MONONITRATE 120 MG: 60 TABLET, EXTENDED RELEASE ORAL at 09:10

## 2022-01-15 RX ADMIN — SODIUM CHLORIDE, PRESERVATIVE FREE 10 ML: 5 INJECTION INTRAVENOUS at 09:10

## 2022-01-15 RX ADMIN — FENOFIBRATE 145 MG: 145 TABLET ORAL at 09:09

## 2022-01-15 RX ADMIN — IOPAMIDOL 94 ML: 755 INJECTION, SOLUTION INTRAVENOUS at 00:58

## 2022-01-15 RX ADMIN — PRASUGREL 10 MG: 10 TABLET, FILM COATED ORAL at 09:10

## 2022-01-15 RX ADMIN — METOPROLOL SUCCINATE 100 MG: 50 TABLET, EXTENDED RELEASE ORAL at 09:09

## 2022-01-15 RX ADMIN — INSULIN ASPART 15 UNITS: 100 INJECTION, SOLUTION INTRAVENOUS; SUBCUTANEOUS at 11:38

## 2022-01-15 RX ADMIN — NITROGLYCERIN 0.4 MG: 0.4 TABLET, ORALLY DISINTEGRATING SUBLINGUAL at 00:05

## 2022-01-15 RX ADMIN — GABAPENTIN 600 MG: 300 CAPSULE ORAL at 09:10

## 2022-01-15 RX ADMIN — PANTOPRAZOLE SODIUM 40 MG: 40 TABLET, DELAYED RELEASE ORAL at 05:14

## 2022-01-15 RX ADMIN — NITROGLYCERIN 0.4 MG: 0.4 TABLET, ORALLY DISINTEGRATING SUBLINGUAL at 00:13

## 2022-01-15 RX ADMIN — RANOLAZINE 1000 MG: 500 TABLET, FILM COATED, EXTENDED RELEASE ORAL at 05:36

## 2022-01-15 RX ADMIN — SODIUM CHLORIDE, PRESERVATIVE FREE 10 ML: 5 INJECTION INTRAVENOUS at 05:10

## 2022-01-15 RX ADMIN — DILTIAZEM HYDROCHLORIDE 120 MG: 120 CAPSULE, COATED, EXTENDED RELEASE ORAL at 09:10

## 2022-01-15 RX ADMIN — NITROGLYCERIN 1.5 INCH: 20 OINTMENT TOPICAL at 01:24

## 2022-01-15 RX ADMIN — MORPHINE SULFATE 1 MG: 2 INJECTION, SOLUTION INTRAMUSCULAR; INTRAVENOUS at 05:10

## 2022-01-15 RX ADMIN — INSULIN ASPART 24 UNITS: 100 INJECTION, SOLUTION INTRAVENOUS; SUBCUTANEOUS at 11:37

## 2022-01-15 RX ADMIN — PRAMIPEXOLE DIHYDROCHLORIDE 0.75 MG: 0.5 TABLET ORAL at 06:07

## 2022-01-15 RX ADMIN — DULOXETINE HYDROCHLORIDE 30 MG: 30 CAPSULE, DELAYED RELEASE ORAL at 09:10

## 2022-01-15 RX ADMIN — INSULIN ASPART 15 UNITS: 100 INJECTION, SOLUTION INTRAVENOUS; SUBCUTANEOUS at 09:11

## 2022-01-15 RX ADMIN — LISINOPRIL 40 MG: 40 TABLET ORAL at 05:14

## 2022-01-15 RX ADMIN — LABETALOL HYDROCHLORIDE 20 MG: 5 INJECTION, SOLUTION INTRAVENOUS at 01:45

## 2022-01-15 NOTE — ED PROVIDER NOTES
Subjective   41-year-old white male with a history of multiple medical problems including coronary artery disease status post stents and PE anticoagulated with Xarelto presents to the emergency department chief complaint of chest pain. Patient complains of chest pain with shortness of breath off and on for several days. He relates the pain is worse tonight. Associated symptoms include palpitations, nausea, and diaphoresis.          Review of Systems   Constitutional: Positive for diaphoresis. Negative for chills and fever.   Respiratory: Positive for shortness of breath. Negative for cough.    Cardiovascular: Positive for chest pain, palpitations and leg swelling.   Gastrointestinal: Positive for nausea. Negative for abdominal pain, blood in stool and vomiting.   Genitourinary: Negative for dysuria and hematuria.   Musculoskeletal: Negative for back pain and neck pain.   Neurological: Positive for headaches. Negative for syncope and weakness.   All other systems reviewed and are negative.      Past Medical History:   Diagnosis Date   • Asthma    • CAD (coronary artery disease)    • Chronic back pain    • Chronic pulmonary embolism (HCC)    • Coronary artery disease    • Diabetes mellitus (HCC)    • Factor II deficiency (HCC)    • Fatty liver    • GERD (gastroesophageal reflux disease)    • Hyperlipidemia    • Hypertension    • Morbid obesity (HCC)    • RLS (restless legs syndrome)    • Seizures (HCC)    • Sleep apnea        Allergies   Allergen Reactions   • Glipizide Other (See Comments) and Unknown (See Comments)     Slurred Speech  Slurred Speech  Hallucinations, Slurred Speech   • Reglan [Metoclopramide] Anxiety   • Tramadol Other (See Comments)     seizures   • Risperidone Other (See Comments)     Slurred speech  Can't stand, trouble breathing, slurred speech         Past Surgical History:   Procedure Laterality Date   • CARDIAC CATHETERIZATION N/A 3/15/2017   • CARDIAC CATHETERIZATION N/A 3/15/2017   • CARDIAC  CATHETERIZATION N/A 3/1/2018   • CARDIAC CATHETERIZATION N/A 2020   • CHOLECYSTECTOMY     • CORONARY ANGIOPLASTY WITH STENT PLACEMENT     • VT RT/LT HEART CATHETERS N/A 3/15/2017   • TRANSESOPHAGEAL ECHOCARDIOGRAM ([facility])         Family History   Problem Relation Age of Onset   • Heart disease Mother    • Obesity Mother    • Sleep apnea Father    • Cancer Paternal Grandfather        Social History     Socioeconomic History   • Marital status: Single     Spouse name: Cori   • Number of children: 0   Tobacco Use   • Smoking status: Former Smoker     Packs/day: 0.25     Years: 0.50     Pack years: 0.12     Types: Cigarettes     Quit date:      Years since quittin.0   • Smokeless tobacco: Current User     Types: Snuff   Vaping Use   • Vaping Use: Never used   Substance and Sexual Activity   • Alcohol use: No   • Drug use: No   • Sexual activity: Not Currently           Objective   Physical Exam  Vitals and nursing note reviewed.   Constitutional:       General: He is not in acute distress.     Appearance: He is obese. He is not toxic-appearing or diaphoretic.   HENT:      Head: Normocephalic and atraumatic.      Right Ear: External ear normal.      Left Ear: External ear normal.      Nose: Nose normal.      Mouth/Throat:      Mouth: Mucous membranes are moist.      Pharynx: Oropharynx is clear.   Eyes:      Extraocular Movements: Extraocular movements intact.      Conjunctiva/sclera: Conjunctivae normal.      Pupils: Pupils are equal, round, and reactive to light.   Cardiovascular:      Rate and Rhythm: Regular rhythm. Tachycardia present.      Pulses: Normal pulses.      Heart sounds: Normal heart sounds.   Pulmonary:      Effort: Pulmonary effort is normal.      Breath sounds: Normal breath sounds.   Abdominal:      General: Bowel sounds are normal. There is no distension.      Palpations: Abdomen is soft. There is no mass.      Tenderness: There is no abdominal tenderness. There is no guarding.    Musculoskeletal:      Cervical back: Normal range of motion and neck supple.      Right lower leg: Edema present.      Left lower leg: Edema present.   Skin:     General: Skin is warm and dry.   Neurological:      General: No focal deficit present.      Mental Status: He is alert and oriented to person, place, and time.         ECG 12 Lead      Date/Time: 1/14/2022 11:03 PM  Performed by: Santhosh Covington MD  Authorized by: Santhosh Covington MD   Interpreted by physician  Rhythm: sinus tachycardia  Rate: tachycardic  BPM: 106  QRS axis: right  Conduction: conduction normal  ST Segments: ST segments normal  T Waves: T waves normal  Other findings: PRWP  Clinical impression: abnormal ECG                 ED Course  ED Course as of 01/15/22 0240   Sat Carl 15, 2022   0237 Patient is alert and resting comfortably in no acute distress.  I reviewed the results of his evaluation with him and recommended admission for observation due to heart score of 5.  Case discussed with Dr. Lin.  She agrees to admit the patient to his telemetry. [DR]      ED Course User Index  [DR] Santhosh Covington MD            Labs Reviewed   COMPREHENSIVE METABOLIC PANEL - Abnormal; Notable for the following components:       Result Value    Glucose 446 (*)     Sodium 134 (*)     Chloride 94 (*)     Albumin 3.20 (*)     All other components within normal limits    Narrative:     GFR Normal >60  Chronic Kidney Disease <60  Kidney Failure <15     CBC WITH AUTO DIFFERENTIAL - Abnormal; Notable for the following components:    Immature Grans % 1.5 (*)     Immature Grans, Absolute 0.12 (*)     All other components within normal limits   COVID-19 AND FLU A/B, NP SWAB IN TRANSPORT MEDIA 8-12 HR TAT - Normal    Narrative:     Fact sheet for providers: https://www.fda.gov/media/217231/download    Fact sheet for patients: https://www.fda.gov/media/766283/download    Test performed by PCR.   BNP (IN-HOUSE) - Normal    Narrative:     Among patients with dyspnea,  NT-proBNP is highly sensitive for the detection of acute congestive heart failure. In addition NT-proBNP of <300 pg/ml effectively rules out acute congestive heart failure with 99% negative predictive value.    Results may be falsely decreased if patient taking Biotin.     TROPONIN (IN-HOUSE) - Normal    Narrative:     Troponin T Reference Range:  <= 0.03 ng/mL-   Negative for AMI  >0.03 ng/mL-     Abnormal for myocardial necrosis.  Clinicians would have to utilize clinical acumen, EKG, Troponin and serial changes to determine if it is an Acute Myocardial Infarction or myocardial injury due to an underlying chronic condition.       Results may be falsely decreased if patient taking Biotin.     CBC AND DIFFERENTIAL    Narrative:     The following orders were created for panel order CBC & Differential.  Procedure                               Abnormality         Status                     ---------                               -----------         ------                     CBC Auto Differential[[id number]]        Abnormal            Final result                 Please view results for these tests on the individual orders.   EXTRA TUBES    Narrative:     The following orders were created for panel order Extra Tubes.  Procedure                               Abnormality         Status                     ---------                               -----------         ------                     Gold Top - SST[[id number]]                                   Final result                 Please view results for these tests on the individual orders.   GOLD TOP - SST     CT Head Without Contrast    Result Date: 1/15/2022  Narrative: EXAM:   CT Head Without Intravenous Contrast CLINICAL HISTORY:   The patient is 41 years old and is Male; headache TECHNIQUE:   Axial computed tomography images of the head/brain without intravenous contrast.  Sagittal and coronal reformatted images were created and reviewed.  This CT exam was performed  using one or more of the following dose reduction techniques:  automated exposure control, adjustment of the mA and/or kV according to patient size, and/or use of iterative reconstruction technique. COMPARISON:   CT HEAD dated April 1 2021 FINDINGS:   BRAIN:  Unremarkable.  The gray-white matter differentiation is preserved . No hemorrhage.  No significant white matter disease. No edema. No extra-axial fluid collections.   VENTRICLES:  Unremarkable.  No ventriculomegaly.   BONES/JOINTS:  No acute fracture.   SOFT TISSUES:  Unremarkable.   SINUSES:  Unremarkable as visualized.  No acute sinusitis.   MASTOID AIR CELLS:  Unremarkable as visualized.  No mastoid effusion.   ORBITS:  Unremarkable as visualized.     Impression:   No acute intracranial findings. Electronically signed by:  Roxanne Rouse MD  1/15/2022 1:00 AM CST Workstation: [id number]    CT Angiogram Chest    Result Date: 1/15/2022  Narrative: EXAM:   CT Angiography Chest With Intravenous Contrast CLINICAL HISTORY:   The patient is 41 years old and is Male; chest pain, dyspnea, rule-out PE TECHNIQUE:   Axial computed tomographic angiography images of the chest with intravenous contrast.  Sagittal and coronal reformatted images were created and reviewed.  This CT exam was performed using one or more of the following dose reduction techniques:  automated exposure control, adjustment of the mA and/or kV according to patient size, and/or use of iterative reconstruction technique.   MIP reconstructed images were created and reviewed. COMPARISON:   CTA CHEST dated June 8 2021 FINDINGS:   PULMONARY ARTERIES:  The timing of contrast is suboptimal to evaluate for pulmonary thromboembolus.    AORTA:  No acute findings.  No thoracic aortic aneurysm.   LUNGS:  The lungs are clear. There is no consolidation. No mass.   PLEURAL SPACE:  Unremarkable.  No significant effusion.  No pneumothorax.   HEART:  Unremarkable.  No cardiomegaly.  No significant pericardial  effusion.  No evidence of RV dysfunction.   BONES/JOINTS:  Healed left-sided rib fracture is present. There is no acute fracture of the visualized bones.  No dislocation.   SOFT TISSUES:  Unremarkable.   LYMPH NODES:  Unremarkable.  No enlarged lymph nodes.   LIVER:  The liver is enlarged and diffusely fatty.     Impression: 1.  The timing of contrast is suboptimal to evaluate for pulmonary thromboembolus. 2.  No acute findings. Electronically signed by:  Roxanne Rouse MD  1/15/2022 2:32 AM CST Workstation: [id number]    XR Chest AP    Result Date: 12/29/2021  Narrative: INDICATION: COVID Evaluation, Cough, Fever EXAMINATION/TECHNIQUE: X-RAY - XR CHEST 1 VIEW COMPARISON: Chest x-ray 4/9/2021 ____________________________________________ FINDINGS:  LINES/DEVICES: None. LUNGS: No consolidation, edema or effusion. No pneumothorax. MEDIASTINUM AND CARDIOVASCULAR STRUCTURES: Cardiac silhouette not enlarged. Central airways and mediastinal contour are unremarkable. BONES AND SOFT TISSUES: Healed left-sided rib fractures are again noted.     Impression: No radiographic evidence of acute cardiopulmonary disease. Electronically signed by:  Tristian Sharma MD  12/29/2021 5:50 PM CST Workstation: [id number]                                        HEART Score (for prediction of 6-week risk of major adverse cardiac event) reviewed and/or performed as part of the patient evaluation and treatment planning process.  The result associated with this review/performance is: 5       MDM    Final diagnoses:   Chest pain, unspecified type   Hypertension, unspecified type   Hyperglycemia       ED Disposition  ED Disposition     ED Disposition Condition Comment    Decision to Admit  Level of Care: Telemetry [5]   Diagnosis: Chest pain, unspecified type [[id number]]   Admitting Physician: JOHN EATON [[id number]]   Attending Physician: JOHN EATON [[id number]]            No follow-up provider specified.       Medication List      No changes  were made to your prescriptions during this visit.          Santhosh Covington MD  01/15/22 1199

## 2022-01-15 NOTE — OUTREACH NOTE
Prep Survey      Responses   [facility] patient discharged from? [facility]   Is LACE score < 7 ? Yes   Emergency Room discharge w/ pulse ox? No   Eligibility [facility]   Date of Admission 01/14/22   Date of Discharge 01/15/22   Discharge Disposition Home or Self Care   Discharge diagnosis Chest pain   Does the patient have one of the following disease processes/diagnoses(primary or secondary)? Other   Does the patient have Home health ordered? No   Is there a DME ordered? No   Prep survey completed? Yes          Diane Marshall RN

## 2022-01-15 NOTE — NURSING NOTE
Educated patient on the importance of wearing his home CPAP as ordered and taking his insulin at home as ordered. Patient verbalizes understanding.

## 2022-01-15 NOTE — DISCHARGE SUMMARY
[facility] Medicine Services  DISCHARGE SUMMARY       Date of Admission: 1/14/2022  Date of Discharge:  1/15/2022  Primary Care Physician: Mami Perez APRN    Presenting Problem/History of Present Illness:  Hyperglycemia [R73.9]  Chest pain, unspecified type [R07.9]  Hypertension, unspecified type [I10]       Final Discharge Diagnoses:  Active Hospital Problems    Diagnosis    • **Chest pain    • Atrial fibrillation with RVR (HCC)    • Type 2 diabetes mellitus with hyperglycemia, with long-term current use of insulin (HCC)    • Hypertension    • History of coronary artery disease    • Shortness of breath    • Hyperlipidemia    • ADOLFO on CPAP        Consults:   Consults     No orders found for last 30 day(s).          Procedures Performed:                 Pertinent Test Results:   Lab Results (last 24 hours)     Procedure Component Value Units Date/Time    Extra Tubes [[id number]] Collected: 01/15/22 0650    Specimen: Blood, Venous Line Updated: 01/15/22 0801    Narrative:      The following orders were created for panel order Extra Tubes.  Procedure                               Abnormality         Status                     ---------                               -----------         ------                     Lavender Top[[id number]]                                     Final result                 Please view results for these tests on the individual orders.    Lavender Top [[id number]] Collected: 01/15/22 0650    Specimen: Blood Updated: 01/15/22 0801     Extra Tube hold for add-on     Comment: Auto resulted       Troponin [[id number]]  (Normal) Collected: 01/15/22 0650    Specimen: Blood Updated: 01/15/22 0720     Troponin T <0.010 ng/mL     Narrative:      Troponin T Reference Range:  <= 0.03 ng/mL-   Negative for AMI  >0.03 ng/mL-     Abnormal for myocardial necrosis.  Clinicians would have to utilize clinical acumen, EKG, Troponin and serial changes to determine  "if it is an Acute Myocardial Infarction or myocardial injury due to an underlying chronic condition.       Results may be falsely decreased if patient taking Biotin.      POC Glucose Once [[id number]]  (Abnormal) Collected: 01/15/22 0600    Specimen: Blood Updated: 01/15/22 0626     Glucose 389 mg/dL      Comment: RN NotifiedOperator: [id number] LISA FAITHMeter ID: [id number]       POC Glucose Once [[id number]]  (Abnormal) Collected: 01/15/22 0336    Specimen: Blood Updated: 01/15/22 0452     Glucose 355 mg/dL      Comment: RN NotifiedOperator: [id number] LISA FAITHMeter ID: [id number]       Procalcitonin [[id number]]  (Normal) Collected: 01/15/22 0005    Specimen: Blood Updated: 01/15/22 0431     Procalcitonin 0.06 ng/mL     Narrative:      As a Marker for Sepsis (Non-Neonates):     1. <0.5 ng/mL represents a low risk of severe sepsis and/or septic shock.  2. >2 ng/mL represents a high risk of severe sepsis and/or septic shock.    As a Marker for Lower Respiratory Tract Infections that require antibiotic therapy:  PCT on Admission     Antibiotic Therapy             6-12 Hrs later  >0.5                          Strongly Recommended            >0.25 - <0.5             Recommended  0.1 - 0.25                  Discouraged                       Remeasure/reassess PCT  <0.1                         Strongly Discouraged         Remeasure/reassess PCT      As 28 day mortality risk marker: \"Change in Procalcitonin Result\" (>80% or <=80%) if Day 0 (or Day 1) and Day 4 values are available. Refer to http://www.[employer]-pct-calculator.com/    Change in PCT <=80 %   A decrease of PCT levels below or equal to 80% defines a positive change in PCT test result representing a higher risk for 28-day all-cause mortality of patients diagnosed with severe sepsis or septic shock.    Change in PCT >80 %   A decrease of PCT levels of more than 80% defines a negative change in PCT result representing a lower risk for 28-day all-cause " mortality of patients diagnosed with severe sepsis or septic shock.                Hemoglobin A1c [[id number]]  (Abnormal) Collected: 01/15/22 0005    Specimen: Blood Updated: 01/15/22 0358     Hemoglobin A1C 12.20 %     Narrative:      Hemoglobin A1C Ranges:    Increased Risk for Diabetes  5.7% to 6.4%  Diabetes                     >= 6.5%  Diabetic Goal                < 7.0%    COVID-19 and FLU A/B PCR - Swab, Nasopharynx [[id number]]  (Normal) Collected: 01/15/22 0142    Specimen: Swab from Nasopharynx Updated: 01/15/22 0215     COVID19 Not Detected     Influenza A PCR Not Detected     Influenza B PCR Not Detected    Narrative:      Fact sheet for providers: https://www.fda.gov/media/594698/download    Fact sheet for patients: https://www.fda.gov/media/132722/download    Test performed by PCR.    Extra Tubes [[id number]] Collected: 01/15/22 0005    Specimen: Blood, Venous Line Updated: 01/15/22 0115    Narrative:      The following orders were created for panel order Extra Tubes.  Procedure                               Abnormality         Status                     ---------                               -----------         ------                     Gold Top - SST[[id number]]                                   Final result                 Please view results for these tests on the individual orders.    Gold Top - SST [[id number]] Collected: 01/15/22 0005    Specimen: Blood Updated: 01/15/22 0115     Extra Tube Hold for add-ons.     Comment: Auto resulted.       Comprehensive Metabolic Panel [[id number]]  (Abnormal) Collected: 01/15/22 0005    Specimen: Blood Updated: 01/15/22 0106     Glucose 446 mg/dL      BUN 9 mg/dL      Creatinine 0.84 mg/dL      Sodium 134 mmol/L      Potassium 3.9 mmol/L      Comment: Slight hemolysis detected by analyzer. Results may be affected.        Chloride 94 mmol/L      CO2 25.0 mmol/L      Calcium 8.8 mg/dL      Total Protein 6.7 g/dL      Albumin 3.20 g/dL      ALT (SGPT) 36 U/L       AST (SGOT) 22 U/L      Comment: Slight hemolysis detected by analyzer. Results may be affected.        Alkaline Phosphatase 84 U/L      Total Bilirubin 0.2 mg/dL      eGFR Non African Amer 100 mL/min/1.73      Globulin 3.5 gm/dL      A/G Ratio 0.9 g/dL      BUN/Creatinine Ratio 10.7     Anion Gap 15.0 mmol/L     Narrative:      GFR Normal >60  Chronic Kidney Disease <60  Kidney Failure <15      Troponin [[id number]]  (Normal) Collected: 01/15/22 0005    Specimen: Blood Updated: 01/15/22 0051     Troponin T <0.010 ng/mL     Narrative:      Troponin T Reference Range:  <= 0.03 ng/mL-   Negative for AMI  >0.03 ng/mL-     Abnormal for myocardial necrosis.  Clinicians would have to utilize clinical acumen, EKG, Troponin and serial changes to determine if it is an Acute Myocardial Infarction or myocardial injury due to an underlying chronic condition.       Results may be falsely decreased if patient taking Biotin.      BNP [[id number]]  (Normal) Collected: 01/15/22 0005    Specimen: Blood Updated: 01/15/22 0049     proBNP 15.6 pg/mL     Narrative:      Among patients with dyspnea, NT-proBNP is highly sensitive for the detection of acute congestive heart failure. In addition NT-proBNP of <300 pg/ml effectively rules out acute congestive heart failure with 99% negative predictive value.    Results may be falsely decreased if patient taking Biotin.      CBC & Differential [[id number]]  (Abnormal) Collected: 01/15/22 0005    Specimen: Blood Updated: 01/15/22 0016    Narrative:      The following orders were created for panel order CBC & Differential.  Procedure                               Abnormality         Status                     ---------                               -----------         ------                     CBC Auto Differential[[id number]]        Abnormal            Final result                 Please view results for these tests on the individual orders.    CBC Auto Differential [[id number]]  (Abnormal)  Collected: 01/15/22 0005    Specimen: Blood Updated: 01/15/22 0016     WBC 7.87 10*3/mm3      RBC 5.16 10*6/mm3      Hemoglobin 14.8 g/dL      Hematocrit 41.4 %      MCV 80.2 fL      MCH 28.7 pg      MCHC 35.7 g/dL      RDW 13.9 %      RDW-SD 40.0 fl      MPV 8.8 fL      Platelets 197 10*3/mm3      Neutrophil % 60.6 %      Lymphocyte % 27.6 %      Monocyte % 7.6 %      Eosinophil % 2.2 %      Basophil % 0.5 %      Immature Grans % 1.5 %      Neutrophils, Absolute 4.77 10*3/mm3      Lymphocytes, Absolute 2.17 10*3/mm3      Monocytes, Absolute 0.60 10*3/mm3      Eosinophils, Absolute 0.17 10*3/mm3      Basophils, Absolute 0.04 10*3/mm3      Immature Grans, Absolute 0.12 10*3/mm3      nRBC 0.0 /100 WBC         Imaging Results (All)     Procedure Component Value Units Date/Time    CT Angiogram Chest [[id number]] Collected: 01/15/22 0045     Updated: 01/15/22 0233    Narrative:      EXAM:    CT Angiography Chest With Intravenous Contrast    CLINICAL HISTORY:    The patient is 41 years old and is Male; chest pain, dyspnea,  rule-out PE    TECHNIQUE:    Axial computed tomographic angiography images of the chest with  intravenous contrast.  Sagittal and coronal reformatted images  were created and reviewed.  This CT exam was performed using one  or more of the following dose reduction techniques:  automated  exposure control, adjustment of the mA and/or kV according to  patient size, and/or use of iterative reconstruction technique.    MIP reconstructed images were created and reviewed.    COMPARISON:    CTA CHEST dated June 8 2021    FINDINGS:    PULMONARY ARTERIES:  The timing of contrast is suboptimal to  evaluate for pulmonary thromboembolus.      AORTA:  No acute findings.  No thoracic aortic aneurysm.    LUNGS:  The lungs are clear. There is no consolidation. No  mass.    PLEURAL SPACE:  Unremarkable.  No significant effusion.  No  pneumothorax.    HEART:  Unremarkable.  No cardiomegaly.  No significant  pericardial  effusion.  No evidence of RV dysfunction.    BONES/JOINTS:  Healed left-sided rib fracture is present. There  is no acute fracture of the visualized bones.  No dislocation.    SOFT TISSUES:  Unremarkable.    LYMPH NODES:  Unremarkable.  No enlarged lymph nodes.    LIVER:  The liver is enlarged and diffusely fatty.      Impression:      1.  The timing of contrast is suboptimal to evaluate for  pulmonary thromboembolus.  2.  No acute findings.    Electronically signed by:  Roxanne Rouse MD  1/15/2022 2:32 AM  CST Workstation: [id number]    CT Head Without Contrast [[id number]] Collected: 01/15/22 0054     Updated: 01/15/22 0101    Narrative:      EXAM:    CT Head Without Intravenous Contrast    CLINICAL HISTORY:    The patient is 41 years old and is Male; headache    TECHNIQUE:    Axial computed tomography images of the head/brain without  intravenous contrast.  Sagittal and coronal reformatted images  were created and reviewed.  This CT exam was performed using one  or more of the following dose reduction techniques:  automated  exposure control, adjustment of the mA and/or kV according to  patient size, and/or use of iterative reconstruction technique.    COMPARISON:    CT HEAD dated April 1 2021    FINDINGS:    BRAIN:  Unremarkable.  The gray-white matter differentiation is  preserved . No hemorrhage.  No significant white matter disease.   No edema. No extra-axial fluid collections.    VENTRICLES:  Unremarkable.  No ventriculomegaly.    BONES/JOINTS:  No acute fracture.    SOFT TISSUES:  Unremarkable.    SINUSES:  Unremarkable as visualized.  No acute sinusitis.    MASTOID AIR CELLS:  Unremarkable as visualized.  No mastoid  effusion.    ORBITS:  Unremarkable as visualized.      Impression:        No acute intracranial findings.    Electronically signed by:  Roxanne Rouse MD  1/15/2022 1:00 AM  CST Workstation: [id number]            Chief Complaint on Day of Discharge: None    Hospital Course:  41-year-old male  "with past medical history of morbid obesity with BMI of 62.37, coronary artery disease, type 2 diabetes, hypertension, hyperlipidemia, gastroesophageal reflux disease, sleep apnea who presented on 1/14/2022 with complaints of dyspnea and chest pain.  She was placed under observation for further evaluation.  Troponin levels have been negative.  No ischemic findings were noted on EKG.  Patient was significantly hypertensive on admission systolic levels over 200.  With decrease in blood pressure, patient reports chest pain has resolved.  CTA was performed although timing of contrast was suboptimal, there were no acute findings.  Patient reports he has been compliant with his anticoagulation regimen at home.    For glycemia was noted during hospital stay and A1c was noted to be 12.9.  Patient does report he has been noncompliant with taking his diabetes meds at home as he reports he has been forgetting at times to take them.  Patient has been instructed to set reminders in his cell phone to assist in taking his medications.  Patient does report he has all medications at home that he is supposed to be taking.    Patient's RN voiced concerns related to daytime drowsiness.  ABG was obtained and reviewed.  Compliance with home CPAP was stressed with the patient as well due to severe sleep apnea.  Patient's dyspnea is likely multifactorial in nature related to his obstructive sleep apnea and obesity hypoventilation.  Patient had glucose levels in 400s during hospital stay but has trended downward with restart of home medications.  He has been instructed to follow-up with his primary care provider within 1 week of discharge.     Condition on Discharge: Stable    Physical Exam on Discharge:  /59 (BP Location: Left arm, Patient Position: Lying)   Pulse 72   Temp 97 °F (36.1 °C) (Temporal)   Resp 18   Ht 180.3 cm (71\")   Wt (!) 203 kg (447 lb 3.2 oz)   SpO2 91%   BMI 62.37 kg/m²   Physical Exam  Vitals and nursing " note reviewed.   Constitutional:       General: He is not in acute distress.     Appearance: He is obese. He is ill-appearing.      Comments: Chronically ill-appearing   HENT:      Head: Normocephalic and atraumatic.      Right Ear: External ear normal.      Left Ear: External ear normal.      Nose: Nose normal.      Mouth/Throat:      Mouth: Mucous membranes are moist.      Pharynx: Oropharynx is clear.   Eyes:      General: No scleral icterus.        Right eye: No discharge.         Left eye: No discharge.      Conjunctiva/sclera: Conjunctivae normal.   Cardiovascular:      Rate and Rhythm: Normal rate and regular rhythm.      Pulses: Normal pulses.      Heart sounds: No murmur heard.  No friction rub. No gallop.    Pulmonary:      Effort: Pulmonary effort is normal. No respiratory distress.      Breath sounds: Normal breath sounds. No wheezing.   Abdominal:      General: Bowel sounds are normal. There is no distension.      Palpations: Abdomen is soft.      Tenderness: There is no abdominal tenderness.   Musculoskeletal:         General: No swelling. Normal range of motion.      Cervical back: Normal range of motion and neck supple.   Skin:     General: Skin is warm and dry.   Neurological:      General: No focal deficit present.      Mental Status: He is alert and oriented to person, place, and time.   Psychiatric:         Mood and Affect: Mood normal.         Behavior: Behavior normal.           Discharge Disposition:  Home or Self Care    Discharge Medications:     Discharge Medications      Changes to Medications      Instructions Start Date   metoprolol succinate  MG 24 hr tablet  Commonly known as: TOPROL-XL  What changed:   · medication strength  · how much to take   100 mg, Oral, Daily   Start Date: January 16, 2022     prasugrel 10 MG tablet  Commonly known as: EFFIENT  What changed: Another medication with the same name was removed. Continue taking this medication, and follow the directions you  "see here.   10 mg, Oral, Daily         Continue These Medications      Instructions Start Date   albuterol sulfate  (90 Base) MCG/ACT inhaler  Commonly known as: PROVENTIL HFA;VENTOLIN HFA;PROAIR HFA   2 puffs, Inhalation, Every 6 Hours PRN      amLODIPine 5 MG tablet  Commonly known as: NORVASC   5 mg, Oral, Every 24 Hours Scheduled      atorvastatin 80 MG tablet  Commonly known as: LIPITOR   80 mg, Oral, Nightly      dilTIAZem  MG 24 hr capsule  Commonly known as: Cardizem CD   120 mg, Oral, Daily      fenofibrate micronized 134 MG capsule  Commonly known as: LOFIBRA   134 mg, Oral, Every Morning Before Breakfast      gabapentin 300 MG capsule  Commonly known as: NEURONTIN   600 mg, Oral, 3 Times Daily      glucose blood test strip   Use as instructed      glucose monitor monitoring kit   1 each, Does not apply, As Needed      insulin glargine 100 UNIT/ML injection  Commonly known as: LANTUS SEMGLEE   80 Units, Subcutaneous, Nightly, Please give 3 months supply      insulin lispro 100 UNIT/ML injection  Commonly known as: HumaLOG   25 Units, Subcutaneous, 3 Times Daily Before Meals      Insulin Syringe-Needle U-100 25G X 1\" 1 ML misc   1 syringe, Does not apply, 4 Times Daily With Meals & Nightly      ipratropium-albuterol 0.5-2.5 mg/3 ml nebulizer  Commonly known as: DUO-NEB   3 mL, Nebulization, 4 Times Daily - RT      isosorbide mononitrate 120 MG 24 hr tablet  Commonly known as: IMDUR   120 mg, Oral, Every Morning      lisinopril 40 MG tablet  Commonly known as: PRINIVIL,ZESTRIL   40 mg, Oral, Nightly      methocarbamol 500 MG tablet  Commonly known as: Robaxin   500 mg, Oral, 4 Times Daily      Microlet Lancets misc   One touch delica lancets      nitroglycerin 0.4 MG SL tablet  Commonly known as: NITROSTAT   0.4 mg, Sublingual, Every 5 Minutes PRN      pantoprazole 40 MG EC tablet  Commonly known as: PROTONIX   40 mg, Oral, Nightly      pramipexole 1 MG tablet  Commonly known as: MIRAPEX   2 " mg, Oral, Nightly      ranolazine 1000 MG 12 hr tablet  Commonly known as: RANEXA   1,000 mg, Oral, Every 12 Hours Scheduled      Repatha SureClick solution auto-injector SureClick injection  Generic drug: Evolocumab   140 mg, Subcutaneous, Every 14 Days      rivaroxaban 20 MG tablet  Commonly known as: XARELTO   20 mg, Oral, Daily With Dinner      SITagliptin 100 MG tablet  Commonly known as: Januvia   100 mg, Oral, Daily      traZODone 300 MG tablet  Commonly known as: DESYREL   300 mg, Oral, Every Night at Bedtime      Trulicity 1.5 MG/0.5ML solution pen-injector  Generic drug: Dulaglutide   1.5 mg, Subcutaneous, Weekly             Discharge Diet: Heart healthy ADA    Activity at Discharge:   Activity Instructions     Activity as Tolerated            Discharge Care Plan/Instructions: Follow-up with PCP within 1 week of discharge.    Follow-up Appointments:   Additional Instructions for the Follow-ups that You Need to Schedule     Discharge Follow-up with PCP   As directed       Currently Documented PCP:    Mami Perez APRN    PCP Phone Number:    [phone]     Follow Up Details: one week            Follow-up Information     Mami Perez APRN Follow up.    Specialties: Family Medicine, Nurse Practitioner  Why: office will call you with appointment date and time  Contact information:  [address] [place] [id number] [phone]                         Test Results Pending at Discharge:         This document has been electronically signed by LALA Tirado on January 15, 2022 12:53 CST        Time: 30 minutes spent on assessment, discussion, management and discharge planning for this patient.

## 2022-01-15 NOTE — PLAN OF CARE
Problem: Adult Inpatient Plan of Care  Goal: Plan of Care Review  Outcome: Ongoing, Progressing  Flowsheets (Taken 1/15/2022 0429)  Progress: improving  Plan of Care Reviewed With: patient     Problem: Adult Inpatient Plan of Care  Goal: Patient-Specific Goal (Individualized)  Outcome: Ongoing, Progressing     Problem: Adult Inpatient Plan of Care  Goal: Absence of Hospital-Acquired Illness or Injury  Outcome: Ongoing, Progressing     Problem: Adult Inpatient Plan of Care  Goal: Absence of Hospital-Acquired Illness or Injury  Intervention: Identify and Manage Fall Risk  Flowsheets (Taken 1/15/2022 0400)  Safety Promotion/Fall Prevention:   assistive device/personal items within reach   clutter free environment maintained   nonskid shoes/slippers when out of bed   safety round/check completed     Problem: Adult Inpatient Plan of Care  Goal: Absence of Hospital-Acquired Illness or Injury  Intervention: Prevent Skin Injury  Flowsheets (Taken 1/15/2022 0400)  Body Position:   supine   position changed independently     Problem: Adult Inpatient Plan of Care  Goal: Absence of Hospital-Acquired Illness or Injury  Intervention: Prevent and Manage VTE (venous thromboembolism) Risk  Flowsheets (Taken 1/15/2022 0400)  VTE Prevention/Management: (pt is taking xarelto) --     Problem: Adult Inpatient Plan of Care  Goal: Absence of Hospital-Acquired Illness or Injury  Intervention: Prevent Infection  Flowsheets (Taken 1/15/2022 0400)  Infection Prevention: rest/sleep promoted     Problem: Adult Inpatient Plan of Care  Goal: Optimal Comfort and Wellbeing  Outcome: Ongoing, Progressing     Problem: Adult Inpatient Plan of Care  Goal: Optimal Comfort and Wellbeing  Intervention: Provide Person-Centered Care  Flowsheets (Taken 1/15/2022 0400)  Trust Relationship/Rapport:   care explained   questions encouraged     Problem: Adult Inpatient Plan of Care  Goal: Readiness for Transition of Care  Outcome: Ongoing, Progressing      Problem: Adult Inpatient Plan of Care  Goal: Readiness for Transition of Care  Intervention: Mutually Develop Transition Plan  Flowsheets  Taken 1/15/2022 0331  Transportation Anticipated: car, drives self  Patient/Family Anticipated Services at Transition: none  Patient/Family Anticipates Transition to: home  Taken 1/15/2022 0330  Equipment Currently Used at Home: cpap     Problem: Chest Pain  Goal: Resolution of Chest Pain Symptoms  Outcome: Ongoing, Progressing     Problem: Chest Pain  Goal: Resolution of Chest Pain Symptoms  Intervention: Manage Acute Chest Pain  Flowsheets (Taken 1/15/2022 0400)  Chest Pain Intervention:   cardiac biomarkers drawn   see MAR     Problem: Diabetes Comorbidity  Goal: Blood Glucose Level Within Desired Range  Outcome: Ongoing, Progressing     Problem: Diabetes Comorbidity  Goal: Blood Glucose Level Within Desired Range  Intervention: Maintain Glycemic Control  Flowsheets (Taken 1/15/2022 0400)  Glycemic Management: blood glucose monitoring     Problem: Hypertension Comorbidity  Goal: Blood Pressure in Desired Range  Outcome: Ongoing, Progressing     Problem: Hypertension Comorbidity  Goal: Blood Pressure in Desired Range  Intervention: Maintain Hypertension-Management Strategies  Flowsheets (Taken 1/15/2022 0429)  Medication Review/Management: medications reviewed   Goal Outcome Evaluation:  Plan of Care Reviewed With: patient        Progress: improving

## 2022-01-15 NOTE — H&P
[facility] Medicine Admission      Date of Admission: 1/14/2022      Primary Care Physician: Mami Perez APRN      Chief Complaint: shortness of breath    HPI:    He is a 41 year old with morbid obesity, CAD, DM, GERD, HTN, HLD, sleep apnea who presents with shortness of breath and chest pain that started today. Shortness of breath has been on and off for a while, slowly worsening. He has had intermittent chest pain. States that he does not remember to take his insulin but about once a week or so. He states he is complaint with all his other oral meds.    In the ED, initial trop is negative. BP is uncontrolled at 222/99.    We are asked to admit for chest pain.    Concurrent Medical History:  has a past medical history of Asthma, CAD (coronary artery disease), Chronic back pain, Chronic pulmonary embolism (HCC), Coronary artery disease, Diabetes mellitus (HCC), Factor II deficiency (HCC), Fatty liver, GERD (gastroesophageal reflux disease), Hyperlipidemia, Hypertension, Morbid obesity (HCC), RLS (restless legs syndrome), Seizures (HCC), and Sleep apnea.    Past Surgical History:  has a past surgical history that includes transesophageal echocardiogram (travis); pr rt/lt heart catheters (N/A, 3/15/2017); Cardiac catheterization (N/A, 3/15/2017); Cardiac catheterization (N/A, 3/15/2017); Coronary angioplasty with stent; Cardiac catheterization (N/A, 3/1/2018); Cholecystectomy; and Cardiac catheterization (N/A, 9/2/2020).    Family History: family history includes Cancer in his paternal grandfather; Heart disease in his mother; Obesity in his mother; Sleep apnea in his father.     Social History:  reports that he quit smoking about 25 years ago. His smoking use included cigarettes. He has a 0.13 pack-year smoking history. His smokeless tobacco use includes snuff. He reports that he does not drink alcohol and does not use drugs.    Allergies:   Allergies   Allergen  Reactions   • Glipizide Other (See Comments) and Unknown (See Comments)     Slurred Speech  Slurred Speech  Hallucinations, Slurred Speech   • Reglan [Metoclopramide] Anxiety   • Tramadol Other (See Comments)     seizures   • Risperidone Other (See Comments)     Slurred speech  Can't stand, trouble breathing, slurred speech         Medications:   Prior to Admission medications    Medication Sig Start Date End Date Taking? Authorizing Provider   albuterol sulfate  (90 Base) MCG/ACT inhaler Inhale 2 puffs Every 6 (Six) Hours As Needed for Wheezing or Shortness of Air. 12/29/21   Ivelisse Mendoza APRN   amLODIPine (NORVASC) 5 MG tablet Take 1 tablet by mouth Daily. 4/2/21   Dinah Lin MD   atorvastatin (LIPITOR) 80 MG tablet Take 1 tablet by mouth Every Night. 9/30/21   Mami Perez APRN   dilTIAZem CD (Cardizem CD) 120 MG 24 hr capsule Take 1 capsule by mouth Daily. 10/8/20   Judy Crews MD   Dulaglutide (Trulicity) 1.5 MG/0.5ML solution pen-injector Inject 1.5 mg under the skin into the appropriate area as directed 1 (One) Time Per Week. 9/30/21   Mami Perez APRN   Evolocumab (Repatha SureClick) solution auto-injector SureClick injection Inject 1 mL under the skin into the appropriate area as directed Every 14 (Fourteen) Days. 9/30/21   Mami Perez APRN   fenofibrate micronized (LOFIBRA) 134 MG capsule Take 1 capsule by mouth Every Morning Before Breakfast. 9/30/21   Mami Perez APRN   gabapentin (NEURONTIN) 300 MG capsule Take 2 capsules by mouth 3 (Three) Times a Day. 12/21/21   Mami Perez APRN   glucose blood test strip Use as instructed 1/8/21   Mami Perez APRN   glucose monitor monitoring kit 1 each As Needed (check blood glucose 3x daily). 9/29/20   Mami Perez APRN   insulin glargine (LANTUS, SEMGLEE) 100 UNIT/ML injection Inject 80 Units under the skin into the appropriate area as directed Every  "Night. Please give 3 months supply 9/30/21   Mami Perez APRN   insulin lispro (HumaLOG) 100 UNIT/ML injection Inject 25 Units under the skin into the appropriate area as directed 3 (Three) Times a Day Before Meals. 6/12/21   Rehan Griffiths MD   Insulin Syringe-Needle U-100 25G X 1\" 1 ML misc 1 syringe 4 (Four) Times a Day With Meals & at Bedtime. 9/30/21   Mami Perez APRN   ipratropium-albuterol (DUO-NEB) 0.5-2.5 mg/3 ml nebulizer Take 3 mL by nebulization 4 (Four) Times a Day. 6/12/21   Rehan Griffiths MD   isosorbide mononitrate (IMDUR) 120 MG 24 hr tablet Take 1 tablet by mouth Every Morning for 30 days. 10/10/17 9/29/21  Minesh Gregg MD   lisinopril (PRINIVIL,ZESTRIL) 40 MG tablet Take 1 tablet by mouth Every Night. 9/30/21   Mami Perez APRN   methocarbamol (Robaxin) 500 MG tablet Take 1 tablet by mouth 4 (Four) Times a Day. 2/2/21   Mami Perez APRN   metoprolol succinate XL (TOPROL-XL) 50 MG 24 hr tablet Take 1 tablet by mouth Daily. 9/30/21   Mami Perez APRN   Microlet Lancets misc One touch delica lancets 9/30/21   Mami Perez APRN   nitroglycerin (NITROSTAT) 0.4 MG SL tablet Place 1 tablet under the tongue Every 5 (Five) Minutes As Needed for Chest Pain for up to 15 days. 10/10/17 9/29/21  Minesh Gregg MD   pantoprazole (PROTONIX) 40 MG EC tablet Take 1 tablet by mouth Every Night. 9/29/20   Mami Perez APRN   pramipexole (MIRAPEX) 1 MG tablet Take 2 tablets by mouth Every Night. 9/30/21   Mami Perez APRN   prasugrel (EFFIENT) 10 MG tablet Take 1 tablet by mouth Daily. 9/30/21   Mami Perez APRN   prasugrel (EFFIENT) 10 MG tablet Take 1 tablet by mouth Daily. 10/1/21   Khadijah Perdue APRN   ranolazine (RANEXA) 1000 MG 12 hr tablet Take 1 tablet by mouth Every 12 (Twelve) Hours. 9/30/21   Mami Perez APRN   rivaroxaban (XARELTO) 20 MG tablet Take 1 tablet by mouth Daily With " Dinner. 9/30/21   Mami Perez APRN   SITagliptin (Januvia) 100 MG tablet Take 1 tablet by mouth Daily. 9/30/21   Mami Perez APRN   traZODone (DESYREL) 300 MG tablet Take 1 tablet by mouth every night at bedtime. 9/30/21   Mami Perez APRN       Review of Systems:  Review of Systems   Constitutional: Negative for chills, diaphoresis and fever.   HENT: Negative for sneezing and sore throat.    Eyes: Negative for pain and discharge.   Respiratory: Positive for shortness of breath. Negative for cough.    Cardiovascular: Positive for chest pain and leg swelling.   Gastrointestinal: Negative for constipation, diarrhea, nausea and vomiting.   Endocrine: Negative for cold intolerance and heat intolerance.   Genitourinary: Negative for difficulty urinating, dysuria, frequency and urgency.   Musculoskeletal: Positive for arthralgias and myalgias.   Skin: Negative for color change and pallor.   Allergic/Immunologic: Negative for environmental allergies and food allergies.   Neurological: Negative for dizziness, syncope and weakness.   Psychiatric/Behavioral: Negative for confusion and sleep disturbance.      Otherwise complete ROS is negative except as mentioned above.    Physical Exam:   Temp:  [97.9 °F (36.6 °C)-98.4 °F (36.9 °C)] 97.9 °F (36.6 °C)  Heart Rate:  [] 92  Resp:  [20-40] 20  BP: (129-222)/() 129/74  Physical Exam  Vitals reviewed.   Constitutional:       General: He is not in acute distress.     Appearance: He is well-developed. He is morbidly obese. He is diaphoretic.   HENT:      Head: Normocephalic and atraumatic.      Nose: Nose normal.   Eyes:      Conjunctiva/sclera: Conjunctivae normal.   Cardiovascular:      Rate and Rhythm: Normal rate and regular rhythm.   Pulmonary:      Effort: Pulmonary effort is normal. Tachypnea present. No respiratory distress.      Breath sounds: Normal breath sounds. No wheezing or rales.   Musculoskeletal:         General: Normal  range of motion.      Cervical back: Normal range of motion and neck supple.   Skin:     General: Skin is warm.   Neurological:      Mental Status: He is alert and oriented to person, place, and time.   Psychiatric:         Behavior: Behavior normal. Behavior is cooperative.           Results Reviewed:  I have personally reviewed current lab, radiology, and data and agree with results.  Lab Results (last 24 hours)     Procedure Component Value Units Date/Time    Hemoglobin A1c [[id number]] Collected: 01/15/22 0005    Specimen: Blood Updated: 01/15/22 0344    Procalcitonin [[id number]] Collected: 01/15/22 0005    Specimen: Blood Updated: 01/15/22 0343    COVID-19 and FLU A/B PCR - Swab, Nasopharynx [[id number]]  (Normal) Collected: 01/15/22 0142    Specimen: Swab from Nasopharynx Updated: 01/15/22 0215     COVID19 Not Detected     Influenza A PCR Not Detected     Influenza B PCR Not Detected    Narrative:      Fact sheet for providers: https://www.fda.gov/media/817039/download    Fact sheet for patients: https://www.fda.gov/media/464489/download    Test performed by PCR.    Extra Tubes [[id number]] Collected: 01/15/22 0005    Specimen: Blood, Venous Line Updated: 01/15/22 0115    Narrative:      The following orders were created for panel order Extra Tubes.  Procedure                               Abnormality         Status                     ---------                               -----------         ------                     Gold Top - SST[[id number]]                                   Final result                 Please view results for these tests on the individual orders.    Gold Top - SST [[id number]] Collected: 01/15/22 0005    Specimen: Blood Updated: 01/15/22 0115     Extra Tube Hold for add-ons.     Comment: Auto resulted.       Comprehensive Metabolic Panel [[id number]]  (Abnormal) Collected: 01/15/22 0005    Specimen: Blood Updated: 01/15/22 0106     Glucose 446 mg/dL      BUN 9 mg/dL      Creatinine 0.84 mg/dL       Sodium 134 mmol/L      Potassium 3.9 mmol/L      Comment: Slight hemolysis detected by analyzer. Results may be affected.        Chloride 94 mmol/L      CO2 25.0 mmol/L      Calcium 8.8 mg/dL      Total Protein 6.7 g/dL      Albumin 3.20 g/dL      ALT (SGPT) 36 U/L      AST (SGOT) 22 U/L      Comment: Slight hemolysis detected by analyzer. Results may be affected.        Alkaline Phosphatase 84 U/L      Total Bilirubin 0.2 mg/dL      eGFR Non African Amer 100 mL/min/1.73      Globulin 3.5 gm/dL      A/G Ratio 0.9 g/dL      BUN/Creatinine Ratio 10.7     Anion Gap 15.0 mmol/L     Narrative:      GFR Normal >60  Chronic Kidney Disease <60  Kidney Failure <15      Troponin [[id number]]  (Normal) Collected: 01/15/22 0005    Specimen: Blood Updated: 01/15/22 0051     Troponin T <0.010 ng/mL     Narrative:      Troponin T Reference Range:  <= 0.03 ng/mL-   Negative for AMI  >0.03 ng/mL-     Abnormal for myocardial necrosis.  Clinicians would have to utilize clinical acumen, EKG, Troponin and serial changes to determine if it is an Acute Myocardial Infarction or myocardial injury due to an underlying chronic condition.       Results may be falsely decreased if patient taking Biotin.      BNP [[id number]]  (Normal) Collected: 01/15/22 0005    Specimen: Blood Updated: 01/15/22 0049     proBNP 15.6 pg/mL     Narrative:      Among patients with dyspnea, NT-proBNP is highly sensitive for the detection of acute congestive heart failure. In addition NT-proBNP of <300 pg/ml effectively rules out acute congestive heart failure with 99% negative predictive value.    Results may be falsely decreased if patient taking Biotin.      CBC & Differential [[id number]]  (Abnormal) Collected: 01/15/22 0005    Specimen: Blood Updated: 01/15/22 0016    Narrative:      The following orders were created for panel order CBC & Differential.  Procedure                               Abnormality         Status                     ---------                                -----------         ------                     CBC Auto Differential[[id number]]        Abnormal            Final result                 Please view results for these tests on the individual orders.    CBC Auto Differential [[id number]]  (Abnormal) Collected: 01/15/22 0005    Specimen: Blood Updated: 01/15/22 0016     WBC 7.87 10*3/mm3      RBC 5.16 10*6/mm3      Hemoglobin 14.8 g/dL      Hematocrit 41.4 %      MCV 80.2 fL      MCH 28.7 pg      MCHC 35.7 g/dL      RDW 13.9 %      RDW-SD 40.0 fl      MPV 8.8 fL      Platelets 197 10*3/mm3      Neutrophil % 60.6 %      Lymphocyte % 27.6 %      Monocyte % 7.6 %      Eosinophil % 2.2 %      Basophil % 0.5 %      Immature Grans % 1.5 %      Neutrophils, Absolute 4.77 10*3/mm3      Lymphocytes, Absolute 2.17 10*3/mm3      Monocytes, Absolute 0.60 10*3/mm3      Eosinophils, Absolute 0.17 10*3/mm3      Basophils, Absolute 0.04 10*3/mm3      Immature Grans, Absolute 0.12 10*3/mm3      nRBC 0.0 /100 WBC         Imaging Results (Last 24 Hours)     Procedure Component Value Units Date/Time    CT Angiogram Chest [[id number]] Collected: 01/15/22 0045     Updated: 01/15/22 0233    Narrative:      EXAM:    CT Angiography Chest With Intravenous Contrast    CLINICAL HISTORY:    The patient is 41 years old and is Male; chest pain, dyspnea,  rule-out PE    TECHNIQUE:    Axial computed tomographic angiography images of the chest with  intravenous contrast.  Sagittal and coronal reformatted images  were created and reviewed.  This CT exam was performed using one  or more of the following dose reduction techniques:  automated  exposure control, adjustment of the mA and/or kV according to  patient size, and/or use of iterative reconstruction technique.    MIP reconstructed images were created and reviewed.    COMPARISON:    CTA CHEST dated June 8 2021    FINDINGS:    PULMONARY ARTERIES:  The timing of contrast is suboptimal to  evaluate for pulmonary thromboembolus.       AORTA:  No acute findings.  No thoracic aortic aneurysm.    LUNGS:  The lungs are clear. There is no consolidation. No  mass.    PLEURAL SPACE:  Unremarkable.  No significant effusion.  No  pneumothorax.    HEART:  Unremarkable.  No cardiomegaly.  No significant  pericardial effusion.  No evidence of RV dysfunction.    BONES/JOINTS:  Healed left-sided rib fracture is present. There  is no acute fracture of the visualized bones.  No dislocation.    SOFT TISSUES:  Unremarkable.    LYMPH NODES:  Unremarkable.  No enlarged lymph nodes.    LIVER:  The liver is enlarged and diffusely fatty.      Impression:      1.  The timing of contrast is suboptimal to evaluate for  pulmonary thromboembolus.  2.  No acute findings.    Electronically signed by:  Roxanne Rouse MD  1/15/2022 2:32 AM  CST Workstation: [id number]    CT Head Without Contrast [[id number]] Collected: 01/15/22 0054     Updated: 01/15/22 0101    Narrative:      EXAM:    CT Head Without Intravenous Contrast    CLINICAL HISTORY:    The patient is 41 years old and is Male; headache    TECHNIQUE:    Axial computed tomography images of the head/brain without  intravenous contrast.  Sagittal and coronal reformatted images  were created and reviewed.  This CT exam was performed using one  or more of the following dose reduction techniques:  automated  exposure control, adjustment of the mA and/or kV according to  patient size, and/or use of iterative reconstruction technique.    COMPARISON:    CT HEAD dated April 1 2021    FINDINGS:    BRAIN:  Unremarkable.  The gray-white matter differentiation is  preserved . No hemorrhage.  No significant white matter disease.   No edema. No extra-axial fluid collections.    VENTRICLES:  Unremarkable.  No ventriculomegaly.    BONES/JOINTS:  No acute fracture.    SOFT TISSUES:  Unremarkable.    SINUSES:  Unremarkable as visualized.  No acute sinusitis.    MASTOID AIR CELLS:  Unremarkable as visualized.  No mastoid  effusion.     ORBITS:  Unremarkable as visualized.      Impression:        No acute intracranial findings.    Electronically signed by:  Roxanne Rouse MD  1/15/2022 1:00 AM  CST Workstation: [id number]            Assessment:    Active Hospital Problems    Diagnosis    • **Chest pain    • Atrial fibrillation with RVR (HCC)    • Type 2 diabetes mellitus with hyperglycemia, with long-term current use of insulin ([facility])    • Hypertension    • History of coronary artery disease    • Shortness of breath    • Hyperlipidemia    • ADOLFO on CPAP              Plan:    1. Chest pain, CAD  -likely 2/2 uncontrolled hypertension  -states he does not take Norvasc anymore for unclear reasons   -will increase dose of BB   -trop negative, will trend  -cardiac monitoring  -sees Dr. Tejada if cards is needed  -continue current meds   -takes Effient, Xarelto at home  -he was given ASA 81mg in ED, does not take at home, will not continue     2. Type 2 Diabetes Mellitus with hyperglycemia and neuropathy  -restart insulin regimen at a lower dose since he is not taking his insulin at home  -Levemir 30mg BID  -Novolog 15 with meals  -SSI  -I would expect we will have to increase that   -A1c  -he is out of Trulicity, will send refill   -stop Januvia if on GLP-1   -continue gabapentin for neuropathy  -add cymbalta     3. Shortness of breath  -reviewed CTA, he is already on chronic anticoagulation   -suspect deconditioning  -no PNA on imaging, will check a procal  -bronchodilators   -oxygen as needed   -PT, OT  -covid negative    4. HLD  -continue home meds     5. Atrial Fibrillation   -continue BB, CCB  -cardiac monitoring  -Xarelto     I confirmed that the patient's Advance Care Plan is present, code status is documented, or surrogate decision maker is listed in the patient's medical record.     I have utilized all available immediate resources to obtain, update, or review the patient's current medications.       I discussed the patients findings and my  recommendations with: ER provider, patient         This document has been electronically signed by Dinah Lin MD on January 15, 2022 03:50 CST

## 2022-01-16 LAB
GLUCOSE BLDC GLUCOMTR-MCNC: 411 MG/DL (ref 70–130)
QT INTERVAL: 408 MS
QTC INTERVAL: 499 MS

## 2022-01-17 ENCOUNTER — TRANSITIONAL CARE MANAGEMENT TELEPHONE ENCOUNTER (OUTPATIENT)
Dept: CALL CENTER | Facility: HOSPITAL | Age: 42
End: 2022-01-17

## 2022-01-17 ENCOUNTER — OFFICE VISIT (OUTPATIENT)
Dept: FAMILY MEDICINE CLINIC | Facility: CLINIC | Age: 42
End: 2022-01-17

## 2022-01-17 DIAGNOSIS — Z79.4 TYPE 2 DIABETES MELLITUS WITH DIABETIC NEUROPATHY, WITH LONG-TERM CURRENT USE OF INSULIN: Chronic | ICD-10-CM

## 2022-01-17 DIAGNOSIS — Z79.4 TYPE 2 DIABETES MELLITUS WITH DIABETIC NEUROPATHY, WITH LONG-TERM CURRENT USE OF INSULIN: Primary | Chronic | ICD-10-CM

## 2022-01-17 DIAGNOSIS — E66.01 MORBID OBESITY: ICD-10-CM

## 2022-01-17 DIAGNOSIS — E11.40 TYPE 2 DIABETES MELLITUS WITH DIABETIC NEUROPATHY, WITH LONG-TERM CURRENT USE OF INSULIN: Primary | Chronic | ICD-10-CM

## 2022-01-17 DIAGNOSIS — Z09 HOSPITAL DISCHARGE FOLLOW-UP: ICD-10-CM

## 2022-01-17 DIAGNOSIS — I48.91 ATRIAL FIBRILLATION WITH RVR: ICD-10-CM

## 2022-01-17 DIAGNOSIS — Z91.199 NONCOMPLIANCE: ICD-10-CM

## 2022-01-17 DIAGNOSIS — E11.40 TYPE 2 DIABETES MELLITUS WITH DIABETIC NEUROPATHY, WITH LONG-TERM CURRENT USE OF INSULIN: Chronic | ICD-10-CM

## 2022-01-17 PROBLEM — I27.82 CHRONIC PULMONARY EMBOLISM (HCC): Chronic | Status: RESOLVED | Noted: 2017-04-22 | Resolved: 2022-01-17

## 2022-01-17 PROCEDURE — 99442 PR PHYS/QHP TELEPHONE EVALUATION 11-20 MIN: CPT | Performed by: NURSE PRACTITIONER

## 2022-01-17 RX ORDER — GABAPENTIN 300 MG/1
600 CAPSULE ORAL 3 TIMES DAILY
Qty: 180 CAPSULE | Refills: 0 | Status: SHIPPED | OUTPATIENT
Start: 2022-01-17 | End: 2022-02-17

## 2022-01-17 RX ORDER — DULAGLUTIDE 3 MG/.5ML
3 INJECTION, SOLUTION SUBCUTANEOUS WEEKLY
Qty: 5 PEN | Refills: 3 | Status: SHIPPED | OUTPATIENT
Start: 2022-01-17

## 2022-01-17 RX ORDER — GABAPENTIN 300 MG/1
600 CAPSULE ORAL 3 TIMES DAILY
Qty: 180 CAPSULE | Refills: 0 | OUTPATIENT
Start: 2022-01-17

## 2022-01-17 NOTE — OUTREACH NOTE
Call Center TCM Note      Responses   [facility] patient discharged from? [facility]   Does the patient have one of the following disease processes/diagnoses(primary or secondary)? Other   TCM attempt successful? Yes   Call start time 1706   Call end time 1711   Discharge diagnosis Chest pain   Medication alerts for this patient EFFIENT   Meds reviewed with patient/caregiver? Yes   Is the patient having any side effects they believe may be caused by any medication additions or changes? No   Does the patient have all medications ordered at discharge? N/A   Is the patient taking all medications as directed (includes completed medication regime)? Yes   Medication comments Pt reports his Trulicity was increased at his f/u today   Does the patient have a primary care provider?  Yes   Does the patient have an appointment with their PCP within 7 days of discharge? Yes   Comments regarding PCP Pt had a telephone f/u completed today with PCP 1/17/22--reports it was his hospital f/u and he will not need another f/u for 3 months   Has the patient kept scheduled appointments due by today? Yes   Has home health visited the patient within 72 hours of discharge? N/A   Nursing interventions Reviewed instructions with patient   What is the patient's perception of their health status since discharge? Improving   Is the patient/caregiver able to teach back signs and symptoms related to disease process for when to call PCP? Yes   Is the patient/caregiver able to teach back the hierarchy of who to call/visit for symptoms/problems? PCP, Specialist, Home health nurse, Urgent Care, ED, 911 Yes   TCM call completed? Yes   Wrap up additional comments Pt doing well--denies chest pain or SOA.  BG elevated and PCP increased Trulicity at his f/u today.            Zahira Cisse RN    1/17/2022, 17:12 EST

## 2022-01-17 NOTE — PROGRESS NOTES
CC: Follow-up      Subjective:  Yordy Hansen is a 41 y.o. male who presents for     This is a telephone visit because of pandemic. Pt needing refill on Gabapentin. States his legs and back hurts a lot. Was recently in hospital for Chest pain and SOA. Blood pressure was noted to be elevated. Once BP was improved, the chest pain resolved.     DM- recent A1C 12.2 while in hospital recently. Pt non-compliant with DM medications. States only takes when he remembers to take his insulin. States gets the Lantus and Humalog messed up. States does check his sugars at least twice daily. Most recent check was 284. Blood pressure was 140/90. States is taking his blood pressure medication. Can remember to take his Gabapentin.  is willing to go to a Dietician if referred      The following portions of the patient's history were reviewed and updated as appropriate: allergies, current medications, past family history, past medical history, past social history, past surgical history and problem list.    Past Medical History:   Diagnosis Date   • Asthma    • CAD (coronary artery disease)    • Chronic back pain    • Chronic pulmonary embolism (HCC)    • Coronary artery disease    • Diabetes mellitus (HCC)    • Factor II deficiency (HCC)    • Fatty liver    • GERD (gastroesophageal reflux disease)    • Hyperlipidemia    • Hypertension    • Morbid obesity (HCC)    • RLS (restless legs syndrome)    • Seizures (HCC)    • Sleep apnea          Current Outpatient Medications:   •  albuterol sulfate  (90 Base) MCG/ACT inhaler, Inhale 2 puffs Every 6 (Six) Hours As Needed for Wheezing or Shortness of Air., Disp: 18 g, Rfl: 0  •  amLODIPine (NORVASC) 5 MG tablet, Take 1 tablet by mouth Daily., Disp: 30 tablet, Rfl: 0  •  atorvastatin (LIPITOR) 80 MG tablet, Take 1 tablet by mouth Every Night., Disp: 90 tablet, Rfl: 3  •  dilTIAZem CD (Cardizem CD) 120 MG 24 hr capsule, Take 1 capsule by mouth Daily., Disp: 90 capsule, Rfl: 0  •  " Dulaglutide (Trulicity) 3 MG/0.5ML solution pen-injector, Inject 0.5 mL under the skin into the appropriate area as directed 1 (One) Time Per Week., Disp: 5 pen, Rfl: 3  •  Evolocumab (Repatha SureClick) solution auto-injector SureClick injection, Inject 1 mL under the skin into the appropriate area as directed Every 14 (Fourteen) Days., Disp: 2 pen, Rfl: 5  •  fenofibrate micronized (LOFIBRA) 134 MG capsule, Take 1 capsule by mouth Every Morning Before Breakfast., Disp: 90 capsule, Rfl: 3  •  gabapentin (NEURONTIN) 300 MG capsule, Take 2 capsules by mouth 3 (Three) Times a Day., Disp: 180 capsule, Rfl: 0  •  glucose blood test strip, Use as instructed, Disp: 100 each, Rfl: 12  •  glucose monitor monitoring kit, 1 each As Needed (check blood glucose 3x daily)., Disp: 1 each, Rfl: 3  •  insulin glargine (LANTUS, SEMGLEE) 100 UNIT/ML injection, Inject 80 Units under the skin into the appropriate area as directed Every Night. Please give 3 months supply, Disp: 70 mL, Rfl: 3  •  insulin lispro (HumaLOG) 100 UNIT/ML injection, Inject 25 Units under the skin into the appropriate area as directed 3 (Three) Times a Day Before Meals., Disp: , Rfl:   •  Insulin Syringe-Needle U-100 25G X 1\" 1 ML misc, 1 syringe 4 (Four) Times a Day With Meals & at Bedtime., Disp: 200 each, Rfl: 5  •  ipratropium-albuterol (DUO-NEB) 0.5-2.5 mg/3 ml nebulizer, Take 3 mL by nebulization 4 (Four) Times a Day., Disp: 360 mL, Rfl: 1  •  isosorbide mononitrate (IMDUR) 120 MG 24 hr tablet, Take 1 tablet by mouth Every Morning for 30 days., Disp: 30 tablet, Rfl: 0  •  lisinopril (PRINIVIL,ZESTRIL) 40 MG tablet, Take 1 tablet by mouth Every Night., Disp: 90 tablet, Rfl: 3  •  methocarbamol (Robaxin) 500 MG tablet, Take 1 tablet by mouth 4 (Four) Times a Day., Disp: 56 tablet, Rfl: 3  •  metoprolol succinate XL (TOPROL-XL) 100 MG 24 hr tablet, Take 1 tablet by mouth Daily., Disp: 30 tablet, Rfl: 0  •  Microlet Lancets misc, One touch delica lancets, " Disp: 150 each, Rfl: 5  •  nitroglycerin (NITROSTAT) 0.4 MG SL tablet, Place 1 tablet under the tongue Every 5 (Five) Minutes As Needed for Chest Pain for up to 15 days., Disp: 25 tablet, Rfl: 6  •  pantoprazole (PROTONIX) 40 MG EC tablet, Take 1 tablet by mouth Every Night., Disp: 90 tablet, Rfl: 3  •  pramipexole (MIRAPEX) 1 MG tablet, Take 2 tablets by mouth Every Night., Disp: 180 tablet, Rfl: 3  •  prasugrel (EFFIENT) 10 MG tablet, Take 1 tablet by mouth Daily., Disp: 90 tablet, Rfl: 3  •  ranolazine (RANEXA) 1000 MG 12 hr tablet, Take 1 tablet by mouth Every 12 (Twelve) Hours., Disp: 180 tablet, Rfl: 3  •  rivaroxaban (XARELTO) 20 MG tablet, Take 1 tablet by mouth Daily With Dinner., Disp: 90 tablet, Rfl: 3  •  SITagliptin (Januvia) 100 MG tablet, Take 1 tablet by mouth Daily., Disp: 90 tablet, Rfl: 3  •  traZODone (DESYREL) 300 MG tablet, Take 1 tablet by mouth every night at bedtime., Disp: 90 tablet, Rfl: 3    Review of Systems    Review of Systems   Constitutional: Negative.    HENT: Negative.    Eyes: Negative.    Respiratory: Negative.  Negative for shortness of breath.    Cardiovascular: Negative.  Negative for chest pain.   Gastrointestinal: Negative.    Endocrine: Negative.    Genitourinary: Negative.    Musculoskeletal: Positive for arthralgias and back pain.   Skin: Negative.    Allergic/Immunologic: Negative.    Neurological: Negative.    Hematological: Negative.    Psychiatric/Behavioral: Negative.    All other systems reviewed and are negative.      Objective  There were no vitals filed for this visit.  There is no height or weight on file to calculate BMI.    Physical Exam    Physical Exam  Deferred, Telephone visit because of pandemic      Diagnoses and all orders for this visit:    1. Type 2 diabetes mellitus with diabetic neuropathy, with long-term current use of insulin (HCC) (Primary)  -     Dulaglutide (Trulicity) 3 MG/0.5ML solution pen-injector; Inject 0.5 mL under the skin into the  appropriate area as directed 1 (One) Time Per Week.  Dispense: 5 pen; Refill: 3  -     gabapentin (NEURONTIN) 300 MG capsule; Take 2 capsules by mouth 3 (Three) Times a Day.  Dispense: 180 capsule; Refill: 0  -     Ambulatory Referral to Nutrition Services  -     Ambulatory Referral to Endocrinology    2. Atrial fibrillation with RVR (HCC)    3. Morbid obesity (HCC)  -     Ambulatory Referral to Nutrition Services    4. Hospital discharge follow-up    5. Noncompliance       This is a hospital discharge follow-up.  Patient is very noncompliant with care encourage patient to take his insulin as scheduled.  We will increase his Trulicity to 3 mg/week.  We will refer him to dietitian/diabetes educator.  We will refer him back to endocrinology he missed his appointment states was advised he could not reschedule this without being referred.  For the atrial fibrillation patient instructed to continue with his anticoagulant.  For the obesity, will refer patient to dietitian.  Patient to follow-up in 3 months or sooner if needed.  Answered all questions.  Patient verbalized understanding of plan of care.    Patient understands the risks associated with this controlled medication, including tolerance and addiction.  he also agrees to only obtain this medication from me, and not from a another provider, unless that provider is covering for me in my absence.  he also agrees to be compliant in dosing, and not self adjust the dose of medication.  A signed controlled substance agreement is on file, and he has received a controlled substance education sheet at this a previous visit.  he has also signed a consent for treatment with a controlled substance as per [facility] policy. CARY was obtained.          This document has been electronically signed by LALA Munoz on January 17, 2022 14:13 CST     You have chosen to receive care through a telephone visit. Do you consent to use a telephone visit for your medical  care today? Yes  This visit has been rescheduled as a phone visit to comply with patient safety concerns in accordance with CDC recommendations. Total time of discussion was 18 minutes.

## 2022-01-18 DIAGNOSIS — E11.65 TYPE 2 DIABETES MELLITUS WITH HYPERGLYCEMIA, WITH LONG-TERM CURRENT USE OF INSULIN: ICD-10-CM

## 2022-01-18 DIAGNOSIS — Z79.4 TYPE 2 DIABETES MELLITUS WITH HYPERGLYCEMIA, WITH LONG-TERM CURRENT USE OF INSULIN: ICD-10-CM

## 2022-02-17 DIAGNOSIS — Z79.4 TYPE 2 DIABETES MELLITUS WITH DIABETIC NEUROPATHY, WITH LONG-TERM CURRENT USE OF INSULIN: Chronic | ICD-10-CM

## 2022-02-17 DIAGNOSIS — E11.40 TYPE 2 DIABETES MELLITUS WITH DIABETIC NEUROPATHY, WITH LONG-TERM CURRENT USE OF INSULIN: Chronic | ICD-10-CM

## 2022-02-17 RX ORDER — GABAPENTIN 300 MG/1
600 CAPSULE ORAL 3 TIMES DAILY
Qty: 180 CAPSULE | Refills: 0 | Status: SHIPPED | OUTPATIENT
Start: 2022-02-17 | End: 2022-03-21 | Stop reason: SDUPTHER

## 2022-03-21 DIAGNOSIS — E11.40 TYPE 2 DIABETES MELLITUS WITH DIABETIC NEUROPATHY, WITH LONG-TERM CURRENT USE OF INSULIN: Chronic | ICD-10-CM

## 2022-03-21 DIAGNOSIS — Z79.4 TYPE 2 DIABETES MELLITUS WITH DIABETIC NEUROPATHY, WITH LONG-TERM CURRENT USE OF INSULIN: Chronic | ICD-10-CM

## 2022-03-21 RX ORDER — GABAPENTIN 300 MG/1
600 CAPSULE ORAL 3 TIMES DAILY
Qty: 180 CAPSULE | Refills: 0 | Status: SHIPPED | OUTPATIENT
Start: 2022-03-21 | End: 2022-04-25 | Stop reason: SDUPTHER

## 2022-03-23 ENCOUNTER — OFFICE VISIT (OUTPATIENT)
Dept: ENDOCRINOLOGY | Facility: CLINIC | Age: 42
End: 2022-03-23

## 2022-03-23 VITALS
HEIGHT: 71 IN | OXYGEN SATURATION: 99 % | SYSTOLIC BLOOD PRESSURE: 124 MMHG | HEART RATE: 108 BPM | WEIGHT: 315 LBS | DIASTOLIC BLOOD PRESSURE: 60 MMHG | BODY MASS INDEX: 44.1 KG/M2

## 2022-03-23 DIAGNOSIS — E78.5 HYPERLIPIDEMIA, UNSPECIFIED HYPERLIPIDEMIA TYPE: ICD-10-CM

## 2022-03-23 DIAGNOSIS — E11.65 TYPE 2 DIABETES MELLITUS WITH HYPERGLYCEMIA, WITH LONG-TERM CURRENT USE OF INSULIN: Primary | ICD-10-CM

## 2022-03-23 DIAGNOSIS — Z79.4 TYPE 2 DIABETES MELLITUS WITH HYPERGLYCEMIA, WITH LONG-TERM CURRENT USE OF INSULIN: Primary | ICD-10-CM

## 2022-03-23 DIAGNOSIS — E66.01 MORBID OBESITY: Chronic | ICD-10-CM

## 2022-03-23 DIAGNOSIS — E11.42 DIABETIC PERIPHERAL NEUROPATHY ASSOCIATED WITH TYPE 2 DIABETES MELLITUS: ICD-10-CM

## 2022-03-23 DIAGNOSIS — I10 PRIMARY HYPERTENSION: ICD-10-CM

## 2022-03-23 PROCEDURE — 99214 OFFICE O/P EST MOD 30 MIN: CPT | Performed by: NURSE PRACTITIONER

## 2022-03-23 NOTE — PATIENT INSTRUCTIONS
Taking Trulicity 3 mg once weekly       Taking Lantus 80 units once evening     Taking Humalog 35 units each meal tid     +       Add sliding scale     151-200          3 units   201-250         6 units   251-300         9 units   Above 301   12  units

## 2022-03-23 NOTE — PROGRESS NOTES
"Chief Complaint  Diabetes    Subjective          Yordy Hansen presents to [facility] ENDOCRINOLOGY  History of Present Illness     In office visit         Chief Complaint   Patient presents with   • Diabetes       HPI    Referring provider LALA Fink     41 year old male presents for consulation         Reason -- diabetes mellitus type 2     Duration  Diagnosed in 2017     Context routine lab work     Timing constant     Quality  Controlled     Severity moderate     Macrovascular complications---CAD, MI , stents X3         Microvascular complications ---  + neuropathy, no DR, no renal disease         Current diabetes regimen     Insulin, glp-1          Current glucose monitoring     fingersticks     Checks 4 times daily     Forgets insulin           He has memory issues she states and he does forget to give his insulin    Review of systems negative                          Objective   Vital Signs:   /60   Pulse 108   Ht 180.3 cm (71\")   Wt (!) 204 kg (450 lb 11.2 oz)   SpO2 99%   BMI 62.86 kg/m²     Physical Exam  Constitutional:       Appearance: Normal appearance.   Cardiovascular:      Rate and Rhythm: Regular rhythm.      Heart sounds: Normal heart sounds.   Pulmonary:      Breath sounds: Normal breath sounds.   Musculoskeletal:      Cervical back: Normal range of motion.   Neurological:      Mental Status: He is alert.        Result Review :   The following data was reviewed by: LALA Tirado on 03/23/2022:  Common labs    Common Labsle 2/7/22 3/4/22 3/4/22 3/10/22     0003 0450    Glucose 386 (A) 425 (A) 324 (A) 419 (A)   BUN 6 (A) 9 9 10   Creatinine 0.9 0.9 0.7 1.0   Sodium 131 (A) 133 (A) 135 (A) 136   Potassium 3.8 3.7 3.6 3.8   Chloride 96 (A) 97 (A) 100 98   Calcium 8.7 9.0 8.3 (A) 8.6   Albumin 3.0 (A) 2.9 (A) 2.7 (A) 3.0 (A)   Total Bilirubin 0.30 0.30 0.30 0.40   Alkaline Phosphatase 71 77 70 81   AST (SGOT) 22 20 28 31   ALT (SGPT) 42 " 37 32 34   (A) Abnormal value       Comments are available for some flowsheets but are not being displayed.                     Assessment and Plan    Diagnoses and all orders for this visit:    1. Type 2 diabetes mellitus with hyperglycemia, with long-term current use of insulin (HCC) (Primary)    2. Diabetic peripheral neuropathy associated with type 2 diabetes mellitus (HCC)    3. Morbid obesity (HCC)    4. Hyperlipidemia, unspecified hyperlipidemia type    5. Primary hypertension                       Glycemic Management:    Diabetes mellitus type 2     Lab Results   Component Value Date    HGBA1C 12.20 (H) 01/15/2022           Taking Trulicity 3 mg once weekly       Taking Lantus 80 units once evening     Taking Humalog 35 units each meal tid --- we discussed adjusting his insulin based on the amount of carbohydrates that he is eating      We will add a sliding scale  +       Add sliding scale     151-200          3 units   201-250         6 units   251-300         9 units   Above 301   12  units         Of asked him to keep a log book so that he can check off before each meal with his insulin and to check off at nighttime to give the Lantus    We will try to get him approved for insulin pump    In a sensor    Of asked him to return in 2 weeks with his blood sugar logs so we can see if his regimen is working        Metformin side effects         Approve omni pod and dilma 2       The patient has diabetes mellitus, insulin-dependent.     Our Diabetes Department has evaluated the patient in the last six months and will continue counseling on insulin adjustment.      The patient performs blood glucose testing at least four times daily with proven glucose variability from 50 to 300 mg per dl.     The patient is administering basal insulin and prandial insulin four times per day for more than six months.     The patient uses a home blood glucose monitor to assess blood glucose at least four times daily for more than  six months.     The patient requires frequent adjustment of insulin treatment regimen based on blood glucose readings.     The patient has frequent variability in blood glucose readings due to activity and variability in meal content and time.      The patient has completed a diabetes education program with us.     The patient has demonstrated the ability to self-monitor her glucose.      The patient is motivated in improving diabetes control      The patient has hypoglycemia unawareness             Microvascular Complications Monitoring       Last eye exam, no DR     Neuropathy  Yes       Lipid Management:     Total Cholesterol   Date Value Ref Range Status   10/20/2021 177 <200 mg/dL Final     Triglycerides   Date Value Ref Range Status   10/20/2021 200 (H) 30 - 150 mg/dL Final   07/10/2019 202 (H) 20 - 160 mg/dL Final     HDL Cholesterol   Date Value Ref Range Status   10/20/2021 32 32 - 72 mg/dL Final     LDL Cholesterol    Date Value Ref Range Status   10/20/2021 104 (H) 5 - 99 mg/dL Final   07/10/2019 80 mg/dL Final     Comment:         OPTIMAL: <100 mg/dl  LOW RISK: 100-129 mg/dl  BORDERLINE HIGH: 130-159 mg/dl  HIGH: 160-189 mg/dl  VERY HIGH: >189 mg/dl         Taking repatha     Taking fenofibrate       Blood Pressure Management:      Taking Cardizem 120 mg daily    Taking Norvasc 5 mg daily      Taking metoprolol 100 mg daily    Taking lisinopril 40 mg daily            Thyroid Health      Lab Results   Component Value Date    TSH 0.60 07/10/2019           Bone Health       Lab Results   Component Value Date    CALCIUM 8.6 03/10/2022    PHOS 4.8 (H) 08/30/2020           Weight Management:      Obesity     Patient's Body mass index is 62.86 kg/m². indicating that he is morbidly obese (BMI > 40 or > 35 with obesity - related health condition). Obesity-related health conditions include the following: diabetes mellitus. Obesity is unchanged. BMI is is above average; no BMI management plan is appropriate. We  discussed portion control and increasing exercise..      Preventive Care:     Non smoker        Records from Mrs. Perez received and reviewed from 2022  Thank you for this consultation                    Follow Up   Return in about 4 weeks (around 4/20/2022).  Patient was given instructions and counseling regarding his condition or for health maintenance advice. Please see specific information pulled into the AVS if appropriate.         This document has been electronically signed by LALA Tirado on March 23, 2022 09:26 CDT.

## 2022-03-23 NOTE — H&P
[facility] Medicine Admission      Date of Admission: 6/14/2019      Primary Care Physician: Ruslan Gonzalez APRN      Chief Complaint: Chest pain    HPI:  This is a 38 year old male with a history of PE on xarelto, factor II mutation, DMII, HTN, HLD, CAD s/p stent, morbid obesity with BMI of 63, chronic chest pain, and ADOLFO who presented to the ED with a complaint of chest pain. His chest pain began today, was moderate in intensity, retrosternal with radiation to the left shoulder, worsened over time, was present for 2 hours, and did not improve with nitroglycerin.  It is not worse with movement, deep breath, or tender to touch.  BP was elevated to 229/110 and glucose was elevated to 459. Troponin is negative and proBNP is 16.7.  He reports taking all his medications today as prescribed.  No other known aggravating or alleviating factors.  No other associated symptoms.     Concurrent Medical History:  has a past medical history of Asthma, Chest pain, Chronic back pain, Coronary artery disease, Diabetes mellitus (CMS/HCC), Factor II deficiency (CMS/HCC), Fatty liver, GERD (gastroesophageal reflux disease), Hyperlipidemia, Hypertension, Morbid obesity (CMS/HCC), Pulmonary embolism (CMS/HCC), RLS (restless legs syndrome), Seizures (CMS/HCC), and Sleep apnea.    Past Surgical History:   Past Surgical History:   Procedure Laterality Date   • ADENOIDECTOMY     • CARDIAC CATHETERIZATION N/A 3/15/2017    Procedure: Left Heart Cath;  Surgeon: Edwige Briceno MD;  Location: [facility] INVASIVE LOCATION;  Service:    • CARDIAC CATHETERIZATION N/A 3/15/2017    Procedure: Stent SOPHIA coronary;  Surgeon: Edwige Briceno MD;  Location: [facility] INVASIVE LOCATION;  Service:    • CARDIAC CATHETERIZATION N/A 3/1/2018    Procedure: Left Heart Cath;  Surgeon: Conor Parsons MD;  Location: [facility] INVASIVE LOCATION;  Service:    • CHOLECYSTECTOMY      lap   • CORONARY ANGIOPLASTY WITH  Patient is scheduled for her EGD on 3/28/22 with Dr. Foy this coming Monday.  Please have office instruct and provide lovenox bridging instructions since she will be off Xarelto 3/26-3/28.    Thanks!  MAUREEN Golden   STENT PLACEMENT     • TX RT/LT HEART CATHETERS N/A 3/15/2017    Procedure: Percutaneous Coronary Intervention;  Surgeon: Edwige Briceno MD;  Location: [facility] INVASIVE LOCATION;  Service: Cardiovascular   • TONSILLECTOMY     • TRANSESOPHAGEAL ECHOCARDIOGRAM (MONICA)         Family History:   Family History   Problem Relation Age of Onset   • Heart disease Mother    • Hypertension Mother    • Hyperlipidemia Mother    • Coronary artery disease Mother    • Diabetes Mother    • Obesity Mother    • Kidney failure Mother    • Sleep apnea Father    • Cancer Paternal Grandfather        Social History:   Social History     Socioeconomic History   • Marital status:      Spouse name: Cori   • Number of children: 0   • Years of education: Not on file   • Highest education level: Not on file   Occupational History   • Occupation: Disabled   Tobacco Use   • Smoking status: Former Smoker     Packs/day: 0.25     Years: 0.50     Pack years: 0.12     Types: Cigarettes     Last attempt to quit:      Years since quittin.4   • Smokeless tobacco: Current User     Types: Snuff   • Tobacco comment: quit smoking 25 years ago; he does use snuff   Substance and Sexual Activity   • Alcohol use: No   • Drug use: No   • Sexual activity: Defer       Allergies:   Allergies   Allergen Reactions   • Glipizide Other (See Comments) and Unknown (See Comments)     Slurred Speech  Slurred Speech  Hallucinations, Slurred Speech   • Reglan [Metoclopramide] Anxiety   • Tramadol Other (See Comments)     seizures   • Risperidone Other (See Comments)     Slurred speech  Can't stand, trouble breathing, slurred speech         Medications:   No current facility-administered medications on file prior to encounter.      Current Outpatient Medications on File Prior to Encounter   Medication Sig Dispense Refill   • albuterol (ACCUNEB) 1.25 MG/3ML nebulizer solution Take 3 mL by nebulization Every 6 (Six) Hours As Needed for Wheezing. 100 vial 0   •  albuterol (PROVENTIL HFA;VENTOLIN HFA) 108 (90 BASE) MCG/ACT inhaler Inhale 2 puffs Every 4 (Four) Hours As Needed for Wheezing.     • amLODIPine (NORVASC) 10 MG tablet Take 1 tablet by mouth Every Night. 30 tablet 5   • atorvastatin (LIPITOR) 80 MG tablet Take 80 mg by mouth.     • budesonide-formoterol (SYMBICORT) 160-4.5 MCG/ACT inhaler Inhale 2 puffs 2 (Two) Times a Day. 6 g 12   • BYSTOLIC 10 MG tablet 5 mg 2 (Two) Times a Day.     • fenofibrate (TRICOR) 145 MG tablet Take 145 mg by mouth Daily.     • glucose monitor monitoring kit 1 each As Needed (check blood glucose 3x daily). 1 each 0   • insulin aspart (novoLOG) 100 UNIT/ML injection Inject 25 Units under the skin into the appropriate area as directed 3 (Three) Times a Day Before Meals. (Patient taking differently: Inject 30 Units under the skin into the appropriate area as directed 3 (Three) Times a Day Before Meals.) 10 mL 3   • insulin degludec (TRESIBA FLEXTOUCH) 100 UNIT/ML solution pen-injector injection Inject 75 Units under the skin into the appropriate area as directed Every Night.     • isosorbide mononitrate (IMDUR) 120 MG 24 hr tablet Take 1 tablet by mouth Daily. (Patient taking differently: Take 120 mg by mouth Every Night.) 30 tablet 5   • lisinopril (PRINIVIL,ZESTRIL) 40 MG tablet Take 1 tablet by mouth Every Morning. (Patient taking differently: Take 40 mg by mouth Every Night.) 30 tablet 5   • MICROLET LANCETS misc One touch delica lancets 100 each 5   • nitroglycerin (NITROSTAT) 0.4 MG SL tablet Place 1 tablet under the tongue Every 5 (Five) Minutes As Needed for Chest Pain. Take no more than 3 doses in 15 minutes. 100 tablet 12   • pantoprazole (PROTONIX) 40 MG EC tablet Take 1 tablet by mouth Every Night. 30 tablet 5   • pramipexole (MIRAPEX) 1 MG tablet TAKE TWO TABLETS BY MOUTH EVERY NIGHT 60 tablet 5   • ranolazine (RANEXA) 1000 MG 12 hr tablet Take 1 tablet by mouth Every 12 (Twelve) Hours. 60 tablet 5   • RELION INSULIN SYRINGE  "31G X 15/64\" 0.5 ML misc      • rivaroxaban (XARELTO) 20 MG tablet Take 1 tablet by mouth Daily. (Patient taking differently: Take 20 mg by mouth Daily With Dinner.) 30 tablet 4   • sertraline (ZOLOFT) 25 MG tablet Take 1 tablet by mouth Daily. (Patient taking differently: Take 25 mg by mouth Every Night.) 30 tablet 5   • traZODone (DESYREL) 300 MG tablet TAKE ONE TABLET BY MOUTH EVERY NIGHT 30 tablet 5   • [DISCONTINUED] clindamycin (CLEOCIN) 300 MG capsule Take 1 capsule by mouth 4 (Four) Times a Day. 40 capsule 0   • [DISCONTINUED] dicyclomine (BENTYL) 20 MG tablet Take 1 tablet by mouth Every 6 (Six) Hours As Needed (abdominal pain and cramping). 21 tablet 0   • [DISCONTINUED] HYDROmorphone (DILAUDID) 2 MG tablet Take 2 mg by mouth Every 4 (Four) Hours As Needed for Moderate Pain .         Review of Systems:  Review of Systems   Constitutional: Negative for appetite change, chills, fatigue and fever.   HENT: Negative for congestion.    Eyes: Negative for visual disturbance.   Respiratory: Negative for cough, chest tightness, shortness of breath and wheezing.    Cardiovascular: Positive for chest pain. Negative for palpitations and leg swelling.   Gastrointestinal: Negative for abdominal pain, diarrhea, nausea and vomiting.   Genitourinary: Negative for difficulty urinating and dysuria.   Musculoskeletal: Negative for arthralgias, back pain, myalgias and neck pain.   Skin: Negative for rash and wound.   Neurological: Negative for dizziness, syncope, weakness, light-headedness, numbness and headaches.   All other systems reviewed and are negative.     Otherwise complete ROS is negative except as mentioned above.    Physical Exam:   Temp:  [97.7 °F (36.5 °C)] 97.7 °F (36.5 °C)  Heart Rate:  [79-96] 85  Resp:  [16-20] 20  BP: (150-229)/() 160/73  Physical Exam   Constitutional: He is oriented to person, place, and time. He appears well-developed and well-nourished. No distress.   Obese   HENT:   Head: " Normocephalic and atraumatic.   Mouth/Throat: Oropharynx is clear and moist.   Eyes: Conjunctivae and EOM are normal.   Neck: Normal range of motion. Neck supple.   Cardiovascular: Normal rate, regular rhythm and intact distal pulses. Exam reveals no gallop and no friction rub.   No murmur heard.  Pulmonary/Chest: Effort normal and breath sounds normal. No respiratory distress. He has no wheezes. He has no rales. He exhibits no tenderness.   Abdominal: Soft. Bowel sounds are normal. He exhibits no distension. There is no tenderness.   Musculoskeletal: Normal range of motion. He exhibits no edema or deformity.   Neurological: He is alert and oriented to person, place, and time.   Skin: Skin is warm and dry. He is not diaphoretic. No erythema.   Psychiatric: He has a normal mood and affect. His behavior is normal. Judgment and thought content normal.   Vitals reviewed.        Results Reviewed:  I have personally reviewed current lab, radiology, and data and agree with results.  Lab Results (last 24 hours)     Procedure Component Value Units Date/Time    POC Glucose Once [[id number]]  (Abnormal) Collected:  06/14/19 1711    Specimen:  Blood Updated:  06/14/19 1723     Glucose 298 mg/dL      Comment: Result Not ConfirmedOperator: [id number] TOAN ROCHEMeter ID: [id number]       [place] Draw [[id number]] Collected:  06/14/19 1507    Specimen:  Blood Updated:  06/14/19 1645    Narrative:       The following orders were created for panel order [place] Draw.  Procedure                               Abnormality         Status                     ---------                               -----------         ------                     Light Blue Top[[id number]]                                   Final result               Green Top (Gel)[[id number]]                                  Final result               Lavender Top[[id number]]                                     Final result               Gold Top - SST[[id number]]                                    Final result                 Please view results for these tests on the individual orders.    Gold Top - SST [[id number]] Collected:  06/14/19 1508    Specimen:  Blood Updated:  06/14/19 1645     Extra Tube Hold for add-ons.     Comment: Auto resulted.       Urinalysis With Microscopic If Indicated (No Culture) - Urine, Clean Catch [[id number]]  (Abnormal) Collected:  06/14/19 1609    Specimen:  Urine, Clean Catch Updated:  06/14/19 1629     Color, UA Yellow     Appearance, UA Clear     pH, UA 6.0     Specific Gravity, UA 1.034     Comment: Result obtained by Refractometer        Glucose, UA >=1000 mg/dL (3+)     Ketones, UA Negative     Bilirubin, UA Negative     Blood, UA Negative     Protein, UA Trace     Leuk Esterase, UA Negative     Nitrite, UA Negative     Urobilinogen, UA 0.2 E.U./dL    Narrative:       Urine microscopic not indicated.    Light Blue Top [[id number]] Collected:  06/14/19 1507    Specimen:  Blood Updated:  06/14/19 1615     Extra Tube hold for add-on     Comment: Auto resulted       Green Top (Gel) [[id number]] Collected:  06/14/19 1507    Specimen:  Blood Updated:  06/14/19 1615     Extra Tube Hold for add-ons.     Comment: Auto resulted.       Lavender Top [[id number]] Collected:  06/14/19 1507    Specimen:  Blood Updated:  06/14/19 1615     Extra Tube hold for add-on     Comment: Auto resulted       Comprehensive Metabolic Panel [[id number]]  (Abnormal) Collected:  06/14/19 1507    Specimen:  Blood Updated:  06/14/19 1538     Glucose 459 mg/dL      BUN 10 mg/dL      Creatinine 0.69 mg/dL      Sodium 132 mmol/L      Potassium 4.3 mmol/L      Chloride 97 mmol/L      CO2 25.0 mmol/L      Calcium 8.9 mg/dL      Total Protein 7.0 g/dL      Albumin 3.50 g/dL      ALT (SGPT) 24 U/L      AST (SGOT) 18 U/L      Alkaline Phosphatase 93 U/L      Total Bilirubin 0.4 mg/dL      eGFR Non African Amer 127 mL/min/1.73      Globulin 3.5 gm/dL      A/G Ratio 1.0 g/dL      BUN/Creatinine  Ratio 14.5     Anion Gap 10.0 mmol/L     Narrative:       GFR Normal >60  Chronic Kidney Disease <60  Kidney Failure <15    Troponin [[id number]]  (Normal) Collected:  06/14/19 1507    Specimen:  Blood Updated:  06/14/19 1534     Troponin T <0.010 ng/mL     Narrative:       Troponin T Reference Range:  <= 0.03 ng/mL-   Negative for AMI  >0.03 ng/mL-     Abnormal for myocardial necrosis.  Clinicians would have to utilize clinical acumen, EKG, Troponin and serial changes to determine if it is an Acute Myocardial Infarction or myocardial injury due to an underlying chronic condition.     BNP [[id number]]  (Normal) Collected:  06/14/19 1507    Specimen:  Blood Updated:  06/14/19 1532     proBNP 16.7 pg/mL     Narrative:       Among patients with dyspnea, NT-proBNP is highly sensitive for the detection of acute congestive heart failure. In addition NT-proBNP of <300 pg/ml effectively rules out acute congestive heart failure with 99% negative predictive value.    CBC & Differential [[id number]] Collected:  06/14/19 1507    Specimen:  Blood Updated:  06/14/19 1511    Narrative:       The following orders were created for panel order CBC & Differential.  Procedure                               Abnormality         Status                     ---------                               -----------         ------                     CBC Auto Differential[[id number]]        Abnormal            Final result                 Please view results for these tests on the individual orders.    CBC Auto Differential [[id number]]  (Abnormal) Collected:  06/14/19 1507    Specimen:  Blood Updated:  06/14/19 1511     WBC 8.29 10*3/mm3      RBC 5.03 10*6/mm3      Hemoglobin 13.9 g/dL      Hematocrit 40.7 %      MCV 80.9 fL      MCH 27.6 pg      MCHC 34.2 g/dL      RDW 13.7 %      RDW-SD 39.8 fl      MPV 9.2 fL      Platelets 190 10*3/mm3      Neutrophil % 70.3 %      Lymphocyte % 16.9 %      Monocyte % 7.4 %      Eosinophil % 4.0 %      Basophil % 0.2  %      Immature Grans % 1.2 %      Neutrophils, Absolute 5.83 10*3/mm3      Lymphocytes, Absolute 1.40 10*3/mm3      Monocytes, Absolute 0.61 10*3/mm3      Eosinophils, Absolute 0.33 10*3/mm3      Basophils, Absolute 0.02 10*3/mm3      Immature Grans, Absolute 0.10 10*3/mm3      nRBC 0.0 /100 WBC         Imaging Results (last 24 hours)     Procedure Component Value Units Date/Time    XR Chest 2 View [[id number]] Collected:  06/14/19 1515     Updated:  06/14/19 1533    Narrative:         EXAM:          Radiograph(s), Chest   VIEWS:    PA / Lat ; 2       DATE/TIME:  6/14/2019 3:31 PM CDT                INDICATION:   chest pain    COMPARISON:  CXR: 4/9/19             FINDINGS:             - lines/tubes:    none     - cardiac:         size within normal limits         - mediastinum: contour within normal limits         - lungs:         no focal air space process, pulmonary  interstitial edema, nodule(s)/mass             - pleura:         no evidence of  fluid                  - osseous:         unremarkable for age                  - misc.:         Impression:       CONCLUSION:        1. No evidence of an active cardiopulmonary process.                                                Electronically signed by:  MIGUEL A Soni MD  6/14/2019 3:32  PM CDT Workstation: [id number]            Assessment:      Chest pain, rule out acute myocardial infarction    ADOLFO on CPAP    Essential hypertension    Chronic pulmonary embolism (CMS/HCC)    Factor II deficiency (CMS/HCC)    Class 3 severe obesity due to excess calories with serious comorbidity and body mass index (BMI) of 60.0 to 69.9 in adult (CMS/HCC)    Type 2 diabetes mellitus without complication, with long-term current use of insulin (CMS/HCC)    CAD (coronary artery disease)    Accelerated hypertension            Plan:  1. Observation, telemetry  2. Serial cardiac enzymes  3. BP control: Norvasc, Bystolic, Lisinopril, Imdur, PRN hydralazine  4. Continue anticoagulation  with Xarelto  5. ASA, statin  6. Ranexa for chronic chest pain  7. Glucose control: Levemir 75 units q hs, Novolog 25 units TID, SSI 0-24 units  8. FULL CODE    I discussed the patient's findings and my recommendations with: patient    Artur Mcfarlanekasie Santana, APRN  06/14/19  6:05 PM

## 2022-03-25 ENCOUNTER — HOSPITAL ENCOUNTER (OUTPATIENT)
Facility: HOSPITAL | Age: 42
Setting detail: OBSERVATION
Discharge: HOME | End: 2022-03-27
Attending: EMERGENCY MEDICINE | Admitting: INTERNAL MEDICINE

## 2022-03-25 ENCOUNTER — APPOINTMENT (OUTPATIENT)
Dept: GENERAL RADIOLOGY | Facility: HOSPITAL | Age: 42
End: 2022-03-25

## 2022-03-25 DIAGNOSIS — I25.10 ATHEROSCLEROSIS OF NATIVE CORONARY ARTERY OF NATIVE HEART, UNSPECIFIED WHETHER ANGINA PRESENT: ICD-10-CM

## 2022-03-25 DIAGNOSIS — E11.65 TYPE 2 DIABETES MELLITUS WITH HYPERGLYCEMIA, UNSPECIFIED WHETHER LONG TERM INSULIN USE: ICD-10-CM

## 2022-03-25 DIAGNOSIS — R07.9 CHEST PAIN, UNSPECIFIED TYPE: ICD-10-CM

## 2022-03-25 DIAGNOSIS — Z98.61 HISTORY OF PTCA: ICD-10-CM

## 2022-03-25 DIAGNOSIS — R07.9 CHEST PAIN WITH HIGH RISK FOR CARDIAC ETIOLOGY: Primary | ICD-10-CM

## 2022-03-25 LAB
ALBUMIN SERPL-MCNC: 3.3 G/DL (ref 3.5–5.2)
ALBUMIN/GLOB SERPL: 1.1 G/DL
ALP SERPL-CCNC: 70 U/L (ref 39–117)
ALT SERPL W P-5'-P-CCNC: 21 U/L (ref 1–41)
ANION GAP SERPL CALCULATED.3IONS-SCNC: 10 MMOL/L (ref 5–15)
APTT PPP: 32.1 SECONDS (ref 20–40.3)
AST SERPL-CCNC: 20 U/L (ref 1–40)
BASOPHILS # BLD AUTO: 0.04 10*3/MM3 (ref 0–0.2)
BASOPHILS NFR BLD AUTO: 0.6 % (ref 0–1.5)
BILIRUB SERPL-MCNC: 0.4 MG/DL (ref 0–1.2)
BUN SERPL-MCNC: 8 MG/DL (ref 6–20)
BUN/CREAT SERPL: 11.3 (ref 7–25)
CALCIUM SPEC-SCNC: 8 MG/DL (ref 8.6–10.5)
CHLORIDE SERPL-SCNC: 99 MMOL/L (ref 98–107)
CO2 SERPL-SCNC: 24 MMOL/L (ref 22–29)
CREAT SERPL-MCNC: 0.71 MG/DL (ref 0.76–1.27)
D-DIMER, QUANTITATIVE (MAD,POW, STR): 465 NG/ML (FEU) (ref 0–470)
DEPRECATED RDW RBC AUTO: 41.5 FL (ref 37–54)
EGFRCR SERPLBLD CKD-EPI 2021: 116.7 ML/MIN/1.73
EOSINOPHIL # BLD AUTO: 0.22 10*3/MM3 (ref 0–0.4)
EOSINOPHIL NFR BLD AUTO: 3.1 % (ref 0.3–6.2)
ERYTHROCYTE [DISTWIDTH] IN BLOOD BY AUTOMATED COUNT: 13.7 % (ref 12.3–15.4)
FLUAV RNA RESP QL NAA+PROBE: NOT DETECTED
FLUBV RNA RESP QL NAA+PROBE: NOT DETECTED
GLOBULIN UR ELPH-MCNC: 3.1 GM/DL
GLUCOSE SERPL-MCNC: 500 MG/DL (ref 65–99)
HCT VFR BLD AUTO: 38.7 % (ref 37.5–51)
HGB BLD-MCNC: 13.4 G/DL (ref 13–17.7)
HOLD SPECIMEN: NORMAL
HOLD SPECIMEN: NORMAL
IMM GRANULOCYTES # BLD AUTO: 0.12 10*3/MM3 (ref 0–0.05)
IMM GRANULOCYTES NFR BLD AUTO: 1.7 % (ref 0–0.5)
INR PPP: 1.01 (ref 0.8–1.2)
LYMPHOCYTES # BLD AUTO: 1.42 10*3/MM3 (ref 0.7–3.1)
LYMPHOCYTES NFR BLD AUTO: 19.9 % (ref 19.6–45.3)
MCH RBC QN AUTO: 28.8 PG (ref 26.6–33)
MCHC RBC AUTO-ENTMCNC: 34.6 G/DL (ref 31.5–35.7)
MCV RBC AUTO: 83.2 FL (ref 79–97)
MONOCYTES # BLD AUTO: 0.59 10*3/MM3 (ref 0.1–0.9)
MONOCYTES NFR BLD AUTO: 8.3 % (ref 5–12)
NEUTROPHILS NFR BLD AUTO: 4.73 10*3/MM3 (ref 1.7–7)
NEUTROPHILS NFR BLD AUTO: 66.4 % (ref 42.7–76)
NRBC BLD AUTO-RTO: 0 /100 WBC (ref 0–0.2)
NT-PROBNP SERPL-MCNC: 61.9 PG/ML (ref 0–450)
PLATELET # BLD AUTO: 170 10*3/MM3 (ref 140–450)
PMV BLD AUTO: 8.8 FL (ref 6–12)
POTASSIUM SERPL-SCNC: 4.3 MMOL/L (ref 3.5–5.2)
PROT SERPL-MCNC: 6.4 G/DL (ref 6–8.5)
PROTHROMBIN TIME: 13.2 SECONDS (ref 11.1–15.3)
RBC # BLD AUTO: 4.65 10*6/MM3 (ref 4.14–5.8)
SARS-COV-2 RNA RESP QL NAA+PROBE: NOT DETECTED
SODIUM SERPL-SCNC: 133 MMOL/L (ref 136–145)
TROPONIN T SERPL-MCNC: <0.01 NG/ML (ref 0–0.03)
WBC NRBC COR # BLD: 7.12 10*3/MM3 (ref 3.4–10.8)
WHOLE BLOOD HOLD SPECIMEN: NORMAL
WHOLE BLOOD HOLD SPECIMEN: NORMAL

## 2022-03-25 PROCEDURE — 87636 SARSCOV2 & INF A&B AMP PRB: CPT | Performed by: EMERGENCY MEDICINE

## 2022-03-25 PROCEDURE — 85730 THROMBOPLASTIN TIME PARTIAL: CPT | Performed by: EMERGENCY MEDICINE

## 2022-03-25 PROCEDURE — 85379 FIBRIN DEGRADATION QUANT: CPT | Performed by: EMERGENCY MEDICINE

## 2022-03-25 PROCEDURE — 93005 ELECTROCARDIOGRAM TRACING: CPT | Performed by: EMERGENCY MEDICINE

## 2022-03-25 PROCEDURE — 85610 PROTHROMBIN TIME: CPT | Performed by: EMERGENCY MEDICINE

## 2022-03-25 PROCEDURE — 93010 ELECTROCARDIOGRAM REPORT: CPT | Performed by: INTERNAL MEDICINE

## 2022-03-25 PROCEDURE — 84484 ASSAY OF TROPONIN QUANT: CPT | Performed by: EMERGENCY MEDICINE

## 2022-03-25 PROCEDURE — 85025 COMPLETE CBC W/AUTO DIFF WBC: CPT | Performed by: EMERGENCY MEDICINE

## 2022-03-25 PROCEDURE — C9803 HOPD COVID-19 SPEC COLLECT: HCPCS

## 2022-03-25 PROCEDURE — 71045 X-RAY EXAM CHEST 1 VIEW: CPT

## 2022-03-25 PROCEDURE — 99284 EMERGENCY DEPT VISIT MOD MDM: CPT

## 2022-03-25 PROCEDURE — 83880 ASSAY OF NATRIURETIC PEPTIDE: CPT | Performed by: EMERGENCY MEDICINE

## 2022-03-25 PROCEDURE — 80053 COMPREHEN METABOLIC PANEL: CPT | Performed by: EMERGENCY MEDICINE

## 2022-03-25 RX ORDER — ASPIRIN 81 MG/1
324 TABLET, CHEWABLE ORAL ONCE
Status: COMPLETED | OUTPATIENT
Start: 2022-03-25 | End: 2022-03-25

## 2022-03-25 RX ORDER — SODIUM CHLORIDE 9 MG/ML
125 INJECTION, SOLUTION INTRAVENOUS CONTINUOUS
Status: DISCONTINUED | OUTPATIENT
Start: 2022-03-25 | End: 2022-03-26

## 2022-03-25 RX ORDER — NITROGLYCERIN 0.4 MG/1
0.4 TABLET SUBLINGUAL
Status: DISCONTINUED | OUTPATIENT
Start: 2022-03-25 | End: 2022-03-26 | Stop reason: SDUPTHER

## 2022-03-25 RX ORDER — SODIUM CHLORIDE 0.9 % (FLUSH) 0.9 %
10 SYRINGE (ML) INJECTION AS NEEDED
Status: DISCONTINUED | OUTPATIENT
Start: 2022-03-25 | End: 2022-03-27 | Stop reason: HOSPADM

## 2022-03-25 RX ADMIN — ASPIRIN 324 MG: 81 TABLET, CHEWABLE ORAL at 22:43

## 2022-03-25 RX ADMIN — NITROGLYCERIN 0.4 MG: 0.4 TABLET, ORALLY DISINTEGRATING SUBLINGUAL at 23:47

## 2022-03-25 RX ADMIN — NITROGLYCERIN 0.4 MG: 0.4 TABLET, ORALLY DISINTEGRATING SUBLINGUAL at 22:43

## 2022-03-26 ENCOUNTER — APPOINTMENT (OUTPATIENT)
Dept: CARDIOLOGY | Facility: HOSPITAL | Age: 42
End: 2022-03-26

## 2022-03-26 PROBLEM — R07.9 CHEST PAIN WITH HIGH RISK FOR CARDIAC ETIOLOGY: Status: ACTIVE | Noted: 2022-03-26

## 2022-03-26 LAB
BH CV ECHO MEAS - ACS: 2.3 CM
BH CV ECHO MEAS - AO MAX PG (FULL): 3 MMHG
BH CV ECHO MEAS - AO MAX PG: 12.4 MMHG
BH CV ECHO MEAS - AO MEAN PG (FULL): 3 MMHG
BH CV ECHO MEAS - AO MEAN PG: 8 MMHG
BH CV ECHO MEAS - AO ROOT AREA (BSA CORRECTED): 1.1
BH CV ECHO MEAS - AO ROOT AREA: 9.1 CM^2
BH CV ECHO MEAS - AO ROOT DIAM: 3.4 CM
BH CV ECHO MEAS - AO V2 MAX: 176 CM/SEC
BH CV ECHO MEAS - AO V2 MEAN: 129 CM/SEC
BH CV ECHO MEAS - AO V2 VTI: 32.1 CM
BH CV ECHO MEAS - AVA(I,A): 4.5 CM^2
BH CV ECHO MEAS - AVA(I,D): 4.5 CM^2
BH CV ECHO MEAS - AVA(V,A): 3.9 CM^2
BH CV ECHO MEAS - AVA(V,D): 3.9 CM^2
BH CV ECHO MEAS - BSA(HAYCOCK): 3.3 M^2
BH CV ECHO MEAS - BSA: 3 M^2
BH CV ECHO MEAS - BZI_BMI: 62.5 KILOGRAMS/M^2
BH CV ECHO MEAS - BZI_METRIC_HEIGHT: 180.3 CM
BH CV ECHO MEAS - BZI_METRIC_WEIGHT: 203.2 KG
BH CV ECHO MEAS - EDV(CUBED): 219.3 ML
BH CV ECHO MEAS - EDV(MOD-SP2): 92 ML
BH CV ECHO MEAS - EDV(MOD-SP4): 146 ML
BH CV ECHO MEAS - EDV(TEICH): 182.1 ML
BH CV ECHO MEAS - EF(CUBED): 81.9 %
BH CV ECHO MEAS - EF(MOD-SP2): 67.1 %
BH CV ECHO MEAS - EF(MOD-SP4): 68.2 %
BH CV ECHO MEAS - EF(TEICH): 73.8 %
BH CV ECHO MEAS - ESV(CUBED): 39.7 ML
BH CV ECHO MEAS - ESV(MOD-SP2): 30.3 ML
BH CV ECHO MEAS - ESV(MOD-SP4): 46.5 ML
BH CV ECHO MEAS - ESV(TEICH): 47.8 ML
BH CV ECHO MEAS - FS: 43.4 %
BH CV ECHO MEAS - IVS/LVPW: 1.5
BH CV ECHO MEAS - IVSD: 1.8 CM
BH CV ECHO MEAS - LA DIMENSION: 4 CM
BH CV ECHO MEAS - LA/AO: 1.2
BH CV ECHO MEAS - LV DIASTOLIC VOL/BSA (35-75): 49.1 ML/M^2
BH CV ECHO MEAS - LV MASS(C)D: 436.9 GRAMS
BH CV ECHO MEAS - LV MASS(C)DI: 147.1 GRAMS/M^2
BH CV ECHO MEAS - LV MAX PG: 9.4 MMHG
BH CV ECHO MEAS - LV MEAN PG: 5 MMHG
BH CV ECHO MEAS - LV SYSTOLIC VOL/BSA (12-30): 15.7 ML/M^2
BH CV ECHO MEAS - LV V1 MAX: 153 CM/SEC
BH CV ECHO MEAS - LV V1 MEAN: 103 CM/SEC
BH CV ECHO MEAS - LV V1 VTI: 31.6 CM
BH CV ECHO MEAS - LVIDD: 6 CM
BH CV ECHO MEAS - LVIDS: 3.4 CM
BH CV ECHO MEAS - LVLD AP2: 7.8 CM
BH CV ECHO MEAS - LVLD AP4: 9.1 CM
BH CV ECHO MEAS - LVLS AP2: 8 CM
BH CV ECHO MEAS - LVLS AP4: 6.6 CM
BH CV ECHO MEAS - LVOT AREA (M): 4.5 CM^2
BH CV ECHO MEAS - LVOT AREA: 4.5 CM^2
BH CV ECHO MEAS - LVOT DIAM: 2.4 CM
BH CV ECHO MEAS - LVPWD: 1.2 CM
BH CV ECHO MEAS - MV A MAX VEL: 65.6 CM/SEC
BH CV ECHO MEAS - MV DEC SLOPE: 618 CM/SEC^2
BH CV ECHO MEAS - MV E MAX VEL: 118 CM/SEC
BH CV ECHO MEAS - MV E/A: 1.8
BH CV ECHO MEAS - MV MAX PG: 7.2 MMHG
BH CV ECHO MEAS - MV MEAN PG: 2 MMHG
BH CV ECHO MEAS - MV P1/2T MAX VEL: 136 CM/SEC
BH CV ECHO MEAS - MV P1/2T: 64.5 MSEC
BH CV ECHO MEAS - MV V2 MAX: 134 CM/SEC
BH CV ECHO MEAS - MV V2 MEAN: 70.5 CM/SEC
BH CV ECHO MEAS - MV V2 VTI: 40.2 CM
BH CV ECHO MEAS - MVA P1/2T LCG: 1.6 CM^2
BH CV ECHO MEAS - MVA(P1/2T): 3.4 CM^2
BH CV ECHO MEAS - MVA(VTI): 3.6 CM^2
BH CV ECHO MEAS - PA MAX PG: 6.7 MMHG
BH CV ECHO MEAS - PA V2 MAX: 129 CM/SEC
BH CV ECHO MEAS - RVDD: 2.4 CM
BH CV ECHO MEAS - SI(AO): 98.1 ML/M^2
BH CV ECHO MEAS - SI(CUBED): 60.5 ML/M^2
BH CV ECHO MEAS - SI(LVOT): 48.1 ML/M^2
BH CV ECHO MEAS - SI(MOD-SP2): 20.8 ML/M^2
BH CV ECHO MEAS - SI(MOD-SP4): 33.5 ML/M^2
BH CV ECHO MEAS - SI(TEICH): 45.2 ML/M^2
BH CV ECHO MEAS - SV(AO): 291.4 ML
BH CV ECHO MEAS - SV(CUBED): 179.6 ML
BH CV ECHO MEAS - SV(LVOT): 143 ML
BH CV ECHO MEAS - SV(MOD-SP2): 61.7 ML
BH CV ECHO MEAS - SV(MOD-SP4): 99.5 ML
BH CV ECHO MEAS - SV(TEICH): 134.3 ML
CHOLEST SERPL-MCNC: 166 MG/DL (ref 0–200)
GLUCOSE BLDC GLUCOMTR-MCNC: 249 MG/DL (ref 70–130)
GLUCOSE BLDC GLUCOMTR-MCNC: 293 MG/DL (ref 70–130)
GLUCOSE BLDC GLUCOMTR-MCNC: 369 MG/DL (ref 70–130)
HBA1C MFR BLD: 11.3 % (ref 4.8–5.6)
HDLC SERPL-MCNC: 23 MG/DL (ref 40–60)
LDLC SERPL CALC-MCNC: 72 MG/DL (ref 0–100)
LDLC/HDLC SERPL: 2.29 {RATIO}
MAXIMAL PREDICTED HEART RATE: 177 BPM
STRESS TARGET HR: 150 BPM
T4 FREE SERPL-MCNC: 1.14 NG/DL (ref 0.93–1.7)
TRIGL SERPL-MCNC: 452 MG/DL (ref 0–150)
TROPONIN T SERPL-MCNC: <0.01 NG/ML (ref 0–0.03)
TROPONIN T SERPL-MCNC: <0.01 NG/ML (ref 0–0.03)
TSH SERPL DL<=0.05 MIU/L-ACNC: 1.45 UIU/ML (ref 0.27–4.2)
VLDLC SERPL-MCNC: 71 MG/DL (ref 5–40)

## 2022-03-26 PROCEDURE — 82962 GLUCOSE BLOOD TEST: CPT

## 2022-03-26 PROCEDURE — 96361 HYDRATE IV INFUSION ADD-ON: CPT

## 2022-03-26 PROCEDURE — 93306 TTE W/DOPPLER COMPLETE: CPT

## 2022-03-26 PROCEDURE — 63710000001 INSULIN REGULAR HUMAN PER 5 UNITS: Performed by: EMERGENCY MEDICINE

## 2022-03-26 PROCEDURE — G0378 HOSPITAL OBSERVATION PER HR: HCPCS

## 2022-03-26 PROCEDURE — 94799 UNLISTED PULMONARY SVC/PX: CPT

## 2022-03-26 PROCEDURE — 93005 ELECTROCARDIOGRAM TRACING: CPT | Performed by: INTERNAL MEDICINE

## 2022-03-26 PROCEDURE — 83036 HEMOGLOBIN GLYCOSYLATED A1C: CPT | Performed by: INTERNAL MEDICINE

## 2022-03-26 PROCEDURE — 93010 ELECTROCARDIOGRAM REPORT: CPT | Performed by: INTERNAL MEDICINE

## 2022-03-26 PROCEDURE — 84443 ASSAY THYROID STIM HORMONE: CPT | Performed by: INTERNAL MEDICINE

## 2022-03-26 PROCEDURE — 84484 ASSAY OF TROPONIN QUANT: CPT | Performed by: EMERGENCY MEDICINE

## 2022-03-26 PROCEDURE — 84439 ASSAY OF FREE THYROXINE: CPT | Performed by: INTERNAL MEDICINE

## 2022-03-26 PROCEDURE — 96374 THER/PROPH/DIAG INJ IV PUSH: CPT

## 2022-03-26 PROCEDURE — 25010000002 ONDANSETRON PER 1 MG

## 2022-03-26 PROCEDURE — 80061 LIPID PANEL: CPT | Performed by: INTERNAL MEDICINE

## 2022-03-26 PROCEDURE — 36415 COLL VENOUS BLD VENIPUNCTURE: CPT | Performed by: INTERNAL MEDICINE

## 2022-03-26 PROCEDURE — 94761 N-INVAS EAR/PLS OXIMETRY MLT: CPT

## 2022-03-26 PROCEDURE — 63710000001 INSULIN DETEMIR PER 5 UNITS: Performed by: INTERNAL MEDICINE

## 2022-03-26 PROCEDURE — 94660 CPAP INITIATION&MGMT: CPT

## 2022-03-26 PROCEDURE — 63710000001 INSULIN ASPART PER 5 UNITS: Performed by: INTERNAL MEDICINE

## 2022-03-26 PROCEDURE — 94760 N-INVAS EAR/PLS OXIMETRY 1: CPT

## 2022-03-26 PROCEDURE — 84484 ASSAY OF TROPONIN QUANT: CPT | Performed by: INTERNAL MEDICINE

## 2022-03-26 RX ORDER — SODIUM CHLORIDE 0.9 % (FLUSH) 0.9 %
10 SYRINGE (ML) INJECTION EVERY 12 HOURS SCHEDULED
Status: DISCONTINUED | OUTPATIENT
Start: 2022-03-26 | End: 2022-03-27 | Stop reason: HOSPADM

## 2022-03-26 RX ORDER — AMLODIPINE BESYLATE 5 MG/1
5 TABLET ORAL
Status: DISCONTINUED | OUTPATIENT
Start: 2022-03-26 | End: 2022-03-27 | Stop reason: HOSPADM

## 2022-03-26 RX ORDER — IPRATROPIUM BROMIDE AND ALBUTEROL SULFATE 2.5; .5 MG/3ML; MG/3ML
3 SOLUTION RESPIRATORY (INHALATION) EVERY 6 HOURS PRN
Status: DISCONTINUED | OUTPATIENT
Start: 2022-03-26 | End: 2022-03-27 | Stop reason: HOSPADM

## 2022-03-26 RX ORDER — PRASUGREL 10 MG/1
10 TABLET, FILM COATED ORAL DAILY
Status: DISCONTINUED | OUTPATIENT
Start: 2022-03-26 | End: 2022-03-27 | Stop reason: HOSPADM

## 2022-03-26 RX ORDER — NICOTINE POLACRILEX 4 MG
15 LOZENGE BUCCAL
Status: DISCONTINUED | OUTPATIENT
Start: 2022-03-26 | End: 2022-03-27 | Stop reason: HOSPADM

## 2022-03-26 RX ORDER — DILTIAZEM HYDROCHLORIDE 120 MG/1
120 CAPSULE, COATED, EXTENDED RELEASE ORAL DAILY
Status: DISCONTINUED | OUTPATIENT
Start: 2022-03-26 | End: 2022-03-27 | Stop reason: HOSPADM

## 2022-03-26 RX ORDER — METOPROLOL SUCCINATE 50 MG/1
100 TABLET, EXTENDED RELEASE ORAL DAILY
Status: DISCONTINUED | OUTPATIENT
Start: 2022-03-26 | End: 2022-03-27 | Stop reason: HOSPADM

## 2022-03-26 RX ORDER — PANTOPRAZOLE SODIUM 40 MG/1
40 TABLET, DELAYED RELEASE ORAL NIGHTLY
Status: DISCONTINUED | OUTPATIENT
Start: 2022-03-26 | End: 2022-03-27 | Stop reason: HOSPADM

## 2022-03-26 RX ORDER — TRAZODONE HYDROCHLORIDE 150 MG/1
300 TABLET ORAL NIGHTLY PRN
Status: DISCONTINUED | OUTPATIENT
Start: 2022-03-26 | End: 2022-03-27 | Stop reason: HOSPADM

## 2022-03-26 RX ORDER — LISINOPRIL 40 MG/1
40 TABLET ORAL NIGHTLY
Status: DISCONTINUED | OUTPATIENT
Start: 2022-03-26 | End: 2022-03-27 | Stop reason: HOSPADM

## 2022-03-26 RX ORDER — RANOLAZINE 500 MG/1
1000 TABLET, EXTENDED RELEASE ORAL EVERY 12 HOURS SCHEDULED
Status: DISCONTINUED | OUTPATIENT
Start: 2022-03-26 | End: 2022-03-27 | Stop reason: HOSPADM

## 2022-03-26 RX ORDER — ISOSORBIDE MONONITRATE 60 MG/1
120 TABLET, EXTENDED RELEASE ORAL EVERY MORNING
Status: DISCONTINUED | OUTPATIENT
Start: 2022-03-26 | End: 2022-03-27 | Stop reason: HOSPADM

## 2022-03-26 RX ORDER — ATORVASTATIN CALCIUM 40 MG/1
80 TABLET, FILM COATED ORAL NIGHTLY
Status: DISCONTINUED | OUTPATIENT
Start: 2022-03-26 | End: 2022-03-27 | Stop reason: HOSPADM

## 2022-03-26 RX ORDER — ONDANSETRON 2 MG/ML
4 INJECTION INTRAMUSCULAR; INTRAVENOUS EVERY 6 HOURS PRN
Status: DISCONTINUED | OUTPATIENT
Start: 2022-03-26 | End: 2022-03-27 | Stop reason: HOSPADM

## 2022-03-26 RX ORDER — LABETALOL HYDROCHLORIDE 5 MG/ML
20 INJECTION, SOLUTION INTRAVENOUS EVERY 4 HOURS PRN
Status: DISCONTINUED | OUTPATIENT
Start: 2022-03-26 | End: 2022-03-27 | Stop reason: HOSPADM

## 2022-03-26 RX ORDER — SODIUM CHLORIDE 0.9 % (FLUSH) 0.9 %
10 SYRINGE (ML) INJECTION AS NEEDED
Status: DISCONTINUED | OUTPATIENT
Start: 2022-03-26 | End: 2022-03-27 | Stop reason: HOSPADM

## 2022-03-26 RX ORDER — GABAPENTIN 300 MG/1
600 CAPSULE ORAL 3 TIMES DAILY
Status: DISCONTINUED | OUTPATIENT
Start: 2022-03-26 | End: 2022-03-27 | Stop reason: HOSPADM

## 2022-03-26 RX ORDER — DEXTROSE MONOHYDRATE 25 G/50ML
25 INJECTION, SOLUTION INTRAVENOUS
Status: DISCONTINUED | OUTPATIENT
Start: 2022-03-26 | End: 2022-03-27 | Stop reason: HOSPADM

## 2022-03-26 RX ORDER — METHOCARBAMOL 500 MG/1
500 TABLET, FILM COATED ORAL 4 TIMES DAILY
Status: DISCONTINUED | OUTPATIENT
Start: 2022-03-26 | End: 2022-03-27 | Stop reason: HOSPADM

## 2022-03-26 RX ORDER — ONDANSETRON 2 MG/ML
INJECTION INTRAMUSCULAR; INTRAVENOUS
Status: COMPLETED
Start: 2022-03-26 | End: 2022-03-26

## 2022-03-26 RX ORDER — HYDRALAZINE HYDROCHLORIDE 20 MG/ML
20 INJECTION INTRAMUSCULAR; INTRAVENOUS EVERY 6 HOURS PRN
Status: DISCONTINUED | OUTPATIENT
Start: 2022-03-26 | End: 2022-03-27 | Stop reason: HOSPADM

## 2022-03-26 RX ORDER — ASPIRIN 81 MG/1
81 TABLET ORAL DAILY
Status: DISCONTINUED | OUTPATIENT
Start: 2022-03-26 | End: 2022-03-27 | Stop reason: HOSPADM

## 2022-03-26 RX ORDER — NITROGLYCERIN 0.4 MG/1
0.4 TABLET SUBLINGUAL
Status: DISCONTINUED | OUTPATIENT
Start: 2022-03-26 | End: 2022-03-27 | Stop reason: HOSPADM

## 2022-03-26 RX ADMIN — INSULIN DETEMIR 40 UNITS: 100 INJECTION, SOLUTION SUBCUTANEOUS at 20:59

## 2022-03-26 RX ADMIN — ASPIRIN 81 MG: 81 TABLET, FILM COATED ORAL at 09:49

## 2022-03-26 RX ADMIN — LISINOPRIL 40 MG: 40 TABLET ORAL at 20:59

## 2022-03-26 RX ADMIN — ATORVASTATIN CALCIUM 80 MG: 40 TABLET, FILM COATED ORAL at 20:59

## 2022-03-26 RX ADMIN — Medication 10 ML: at 09:49

## 2022-03-26 RX ADMIN — RANOLAZINE 1000 MG: 500 TABLET, FILM COATED, EXTENDED RELEASE ORAL at 03:17

## 2022-03-26 RX ADMIN — METHOCARBAMOL 500 MG: 500 TABLET, FILM COATED ORAL at 12:10

## 2022-03-26 RX ADMIN — GABAPENTIN 600 MG: 300 CAPSULE ORAL at 20:59

## 2022-03-26 RX ADMIN — PRAMIPEXOLE DIHYDROCHLORIDE 1.5 MG: 0.5 TABLET ORAL at 03:29

## 2022-03-26 RX ADMIN — METOPROLOL SUCCINATE 100 MG: 50 TABLET, EXTENDED RELEASE ORAL at 09:49

## 2022-03-26 RX ADMIN — METHOCARBAMOL 500 MG: 500 TABLET, FILM COATED ORAL at 20:59

## 2022-03-26 RX ADMIN — ONDANSETRON 4 MG: 2 INJECTION INTRAMUSCULAR; INTRAVENOUS at 01:13

## 2022-03-26 RX ADMIN — SODIUM CHLORIDE 125 ML/HR: 9 INJECTION, SOLUTION INTRAVENOUS at 04:30

## 2022-03-26 RX ADMIN — RIVAROXABAN 20 MG: 10 TABLET, FILM COATED ORAL at 17:40

## 2022-03-26 RX ADMIN — SODIUM CHLORIDE 1000 ML: 9 INJECTION, SOLUTION INTRAVENOUS at 00:28

## 2022-03-26 RX ADMIN — GABAPENTIN 600 MG: 300 CAPSULE ORAL at 15:03

## 2022-03-26 RX ADMIN — RANOLAZINE 1000 MG: 500 TABLET, FILM COATED, EXTENDED RELEASE ORAL at 20:58

## 2022-03-26 RX ADMIN — LISINOPRIL 40 MG: 40 TABLET ORAL at 03:16

## 2022-03-26 RX ADMIN — GABAPENTIN 600 MG: 300 CAPSULE ORAL at 09:49

## 2022-03-26 RX ADMIN — NITROGLYCERIN 1 INCH: 20 OINTMENT TOPICAL at 00:28

## 2022-03-26 RX ADMIN — SODIUM CHLORIDE 125 ML/HR: 9 INJECTION, SOLUTION INTRAVENOUS at 15:03

## 2022-03-26 RX ADMIN — ISOSORBIDE MONONITRATE 120 MG: 60 TABLET, EXTENDED RELEASE ORAL at 09:49

## 2022-03-26 RX ADMIN — INSULIN ASPART 12 UNITS: 100 INJECTION, SOLUTION INTRAVENOUS; SUBCUTANEOUS at 09:49

## 2022-03-26 RX ADMIN — PRAMIPEXOLE DIHYDROCHLORIDE 1.5 MG: 0.5 TABLET ORAL at 20:59

## 2022-03-26 RX ADMIN — METHOCARBAMOL 500 MG: 500 TABLET, FILM COATED ORAL at 03:17

## 2022-03-26 RX ADMIN — PRASUGREL 10 MG: 10 TABLET, FILM COATED ORAL at 09:53

## 2022-03-26 RX ADMIN — Medication 10 ML: at 03:18

## 2022-03-26 RX ADMIN — METHOCARBAMOL 500 MG: 500 TABLET, FILM COATED ORAL at 09:50

## 2022-03-26 RX ADMIN — INSULIN ASPART 12 UNITS: 100 INJECTION, SOLUTION INTRAVENOUS; SUBCUTANEOUS at 17:40

## 2022-03-26 RX ADMIN — DILTIAZEM HYDROCHLORIDE 120 MG: 120 CAPSULE, COATED, EXTENDED RELEASE ORAL at 09:53

## 2022-03-26 RX ADMIN — METHOCARBAMOL 500 MG: 500 TABLET, FILM COATED ORAL at 17:40

## 2022-03-26 RX ADMIN — Medication 10 ML: at 21:00

## 2022-03-26 RX ADMIN — AMLODIPINE BESYLATE 5 MG: 5 TABLET ORAL at 09:50

## 2022-03-26 RX ADMIN — NITROGLYCERIN 1 INCH: 20 OINTMENT TOPICAL at 17:39

## 2022-03-26 RX ADMIN — PANTOPRAZOLE SODIUM 40 MG: 40 TABLET, DELAYED RELEASE ORAL at 21:19

## 2022-03-26 RX ADMIN — INSULIN DETEMIR 40 UNITS: 100 INJECTION, SOLUTION SUBCUTANEOUS at 04:06

## 2022-03-26 RX ADMIN — PANTOPRAZOLE SODIUM 40 MG: 40 TABLET, DELAYED RELEASE ORAL at 03:16

## 2022-03-26 RX ADMIN — TRAZODONE HYDROCHLORIDE 300 MG: 100 TABLET ORAL at 03:22

## 2022-03-26 RX ADMIN — HUMAN INSULIN 10 UNITS: 100 INJECTION, SOLUTION SUBCUTANEOUS at 00:32

## 2022-03-27 ENCOUNTER — READMISSION MANAGEMENT (OUTPATIENT)
Dept: CALL CENTER | Facility: HOSPITAL | Age: 42
End: 2022-03-27

## 2022-03-27 VITALS
HEART RATE: 67 BPM | TEMPERATURE: 97.8 F | BODY MASS INDEX: 44.1 KG/M2 | OXYGEN SATURATION: 94 % | WEIGHT: 315 LBS | RESPIRATION RATE: 20 BRPM | SYSTOLIC BLOOD PRESSURE: 159 MMHG | HEIGHT: 71 IN | DIASTOLIC BLOOD PRESSURE: 82 MMHG

## 2022-03-27 LAB
ALBUMIN SERPL-MCNC: 3.2 G/DL (ref 3.5–5.2)
ALBUMIN/GLOB SERPL: 1 G/DL
ALP SERPL-CCNC: 67 U/L (ref 39–117)
ALT SERPL W P-5'-P-CCNC: 23 U/L (ref 1–41)
ANION GAP SERPL CALCULATED.3IONS-SCNC: 10 MMOL/L (ref 5–15)
AST SERPL-CCNC: 23 U/L (ref 1–40)
BILIRUB SERPL-MCNC: 0.3 MG/DL (ref 0–1.2)
BUN SERPL-MCNC: 9 MG/DL (ref 6–20)
BUN/CREAT SERPL: 12.3 (ref 7–25)
CALCIUM SPEC-SCNC: 8.6 MG/DL (ref 8.6–10.5)
CHLORIDE SERPL-SCNC: 101 MMOL/L (ref 98–107)
CO2 SERPL-SCNC: 25 MMOL/L (ref 22–29)
CREAT SERPL-MCNC: 0.73 MG/DL (ref 0.76–1.27)
DEPRECATED RDW RBC AUTO: 43.6 FL (ref 37–54)
EGFRCR SERPLBLD CKD-EPI 2021: 115.8 ML/MIN/1.73
ERYTHROCYTE [DISTWIDTH] IN BLOOD BY AUTOMATED COUNT: 14.1 % (ref 12.3–15.4)
GLOBULIN UR ELPH-MCNC: 3.1 GM/DL
GLUCOSE BLDC GLUCOMTR-MCNC: 285 MG/DL (ref 70–130)
GLUCOSE BLDC GLUCOMTR-MCNC: 298 MG/DL (ref 70–130)
GLUCOSE SERPL-MCNC: 331 MG/DL (ref 65–99)
HCT VFR BLD AUTO: 40.8 % (ref 37.5–51)
HGB BLD-MCNC: 13.6 G/DL (ref 13–17.7)
MAGNESIUM SERPL-MCNC: 1.8 MG/DL (ref 1.6–2.6)
MCH RBC QN AUTO: 28.4 PG (ref 26.6–33)
MCHC RBC AUTO-ENTMCNC: 33.3 G/DL (ref 31.5–35.7)
MCV RBC AUTO: 85.2 FL (ref 79–97)
PLATELET # BLD AUTO: 201 10*3/MM3 (ref 140–450)
PMV BLD AUTO: 9 FL (ref 6–12)
POTASSIUM SERPL-SCNC: 4.2 MMOL/L (ref 3.5–5.2)
PROT SERPL-MCNC: 6.3 G/DL (ref 6–8.5)
RBC # BLD AUTO: 4.79 10*6/MM3 (ref 4.14–5.8)
SODIUM SERPL-SCNC: 136 MMOL/L (ref 136–145)
WBC NRBC COR # BLD: 9.27 10*3/MM3 (ref 3.4–10.8)

## 2022-03-27 PROCEDURE — 94799 UNLISTED PULMONARY SVC/PX: CPT

## 2022-03-27 PROCEDURE — G0378 HOSPITAL OBSERVATION PER HR: HCPCS

## 2022-03-27 PROCEDURE — 36415 COLL VENOUS BLD VENIPUNCTURE: CPT | Performed by: INTERNAL MEDICINE

## 2022-03-27 PROCEDURE — 85027 COMPLETE CBC AUTOMATED: CPT | Performed by: INTERNAL MEDICINE

## 2022-03-27 PROCEDURE — 82962 GLUCOSE BLOOD TEST: CPT

## 2022-03-27 PROCEDURE — 94660 CPAP INITIATION&MGMT: CPT

## 2022-03-27 PROCEDURE — 63710000001 INSULIN DETEMIR PER 5 UNITS: Performed by: INTERNAL MEDICINE

## 2022-03-27 PROCEDURE — 94760 N-INVAS EAR/PLS OXIMETRY 1: CPT

## 2022-03-27 PROCEDURE — 80053 COMPREHEN METABOLIC PANEL: CPT | Performed by: INTERNAL MEDICINE

## 2022-03-27 PROCEDURE — 63710000001 INSULIN ASPART PER 5 UNITS: Performed by: INTERNAL MEDICINE

## 2022-03-27 PROCEDURE — 83735 ASSAY OF MAGNESIUM: CPT | Performed by: INTERNAL MEDICINE

## 2022-03-27 RX ORDER — NITROGLYCERIN 80 MG/1
1 PATCH TRANSDERMAL DAILY
Qty: 30 EACH | Refills: 0 | Status: SHIPPED | OUTPATIENT
Start: 2022-03-27

## 2022-03-27 RX ADMIN — INSULIN DETEMIR 40 UNITS: 100 INJECTION, SOLUTION SUBCUTANEOUS at 09:13

## 2022-03-27 RX ADMIN — INSULIN ASPART 12 UNITS: 100 INJECTION, SOLUTION INTRAVENOUS; SUBCUTANEOUS at 09:13

## 2022-03-27 RX ADMIN — NITROGLYCERIN 1 INCH: 20 OINTMENT TOPICAL at 00:06

## 2022-03-27 RX ADMIN — RANOLAZINE 1000 MG: 500 TABLET, FILM COATED, EXTENDED RELEASE ORAL at 09:11

## 2022-03-27 RX ADMIN — ISOSORBIDE MONONITRATE 120 MG: 60 TABLET, EXTENDED RELEASE ORAL at 05:31

## 2022-03-27 RX ADMIN — METOPROLOL SUCCINATE 100 MG: 50 TABLET, EXTENDED RELEASE ORAL at 09:11

## 2022-03-27 RX ADMIN — NITROGLYCERIN 1 INCH: 20 OINTMENT TOPICAL at 05:32

## 2022-03-27 RX ADMIN — PRASUGREL 10 MG: 10 TABLET, FILM COATED ORAL at 09:11

## 2022-03-27 RX ADMIN — METHOCARBAMOL 500 MG: 500 TABLET, FILM COATED ORAL at 09:11

## 2022-03-27 RX ADMIN — ASPIRIN 81 MG: 81 TABLET, FILM COATED ORAL at 09:11

## 2022-03-27 RX ADMIN — GABAPENTIN 600 MG: 300 CAPSULE ORAL at 09:11

## 2022-03-27 RX ADMIN — AMLODIPINE BESYLATE 5 MG: 5 TABLET ORAL at 09:11

## 2022-03-27 RX ADMIN — Medication 10 ML: at 09:15

## 2022-03-27 RX ADMIN — DILTIAZEM HYDROCHLORIDE 120 MG: 120 CAPSULE, COATED, EXTENDED RELEASE ORAL at 09:13

## 2022-03-27 NOTE — OUTREACH NOTE
Prep Survey    Flowsheet Row Responses   [facility] facility patient discharged from? [place]   Is LACE score < 7 ? No   Emergency Room discharge w/ pulse ox? No   Eligibility [facility]   Date of Admission 03/25/22   Date of Discharge 03/27/22   Discharge Disposition Home or Self Care   Discharge diagnosis chest pain - medical management, Hx CAD with multiple PCI, T2DM, chronic anticoagulation   Does the patient have one of the following disease processes/diagnoses(primary or secondary)? Other   Does the patient have Home health ordered? No   Is there a DME ordered? No   Prep survey completed? Yes          REANNA LEGGETT - Registered Nurse

## 2022-03-28 ENCOUNTER — TRANSITIONAL CARE MANAGEMENT TELEPHONE ENCOUNTER (OUTPATIENT)
Dept: CALL CENTER | Facility: HOSPITAL | Age: 42
End: 2022-03-28

## 2022-03-28 NOTE — OUTREACH NOTE
Call Center TCM Note    Flowsheet Row Responses   [facility] patient discharged from? [place]   Does the patient have one of the following disease processes/diagnoses(primary or secondary)? Other   TCM attempt successful? Yes   Call start time 1455   Call end time 1501   Discharge diagnosis chest pain - medical management, Hx CAD with multiple PCI, T2DM, chronic anticoagulation   Medication alerts for this patient XARELTO   Meds reviewed with patient/caregiver? Yes   Is the patient having any side effects they believe may be caused by any medication additions or changes? No   Does the patient have all medications ordered at discharge? Yes   Is the patient taking all medications as directed (includes completed medication regime)? Yes   Does the patient have a primary care provider?  Yes   Does the patient have an appointment with their PCP within 7 days of discharge? No   Comments regarding PCP Hospital PCP FOLLOW UP APPOINTMENT IS 4/12/22--appt exceed TCM guidelines, offered to schedule an earlier appt and pt declines   What is preventing the patient from scheduling follow up appointments within 7 days of discharge? --  [Patient preference]   Nursing Interventions Verified appointment date/time/provider   Has the patient kept scheduled appointments due by today? N/A   Has home health visited the patient within 72 hours of discharge? N/A   Did the patient receive a copy of their discharge instructions? Yes   Nursing interventions Reviewed instructions with patient   What is the patient's perception of their health status since discharge? Same  [Patient reports he is still having chest pain at times but has not had to use NTG sl, reviewed use and discussed his other medications (confirmed compliance with Isosorbide and Ranexa)reviewed other cardiac related s/s and need to seek care, v/u]   Is the patient/caregiver able to teach back signs and symptoms related to disease process for when to call PCP? Yes    Is the patient/caregiver able to teach back signs and symptoms related to disease process for when to call 911? Yes   Additional teach back comments Discussed BG readings and he reports they are in the 400's this RN reviewed his insulin doses--he reports compliance with dosing and diet.  Strongly encouraged to monitor diet, trend BG levels and bring readings to f/u appt for adjustments to medications as needed.    TCM call completed? Yes          Zahira Cisse RN    3/28/2022, 15:06 EDT

## 2022-04-03 LAB
QT INTERVAL: 378 MS
QT INTERVAL: 430 MS
QTC INTERVAL: 440 MS
QTC INTERVAL: 457 MS

## 2022-04-05 ENCOUNTER — TELEPHONE (OUTPATIENT)
Dept: ENDOCRINOLOGY | Facility: CLINIC | Age: 42
End: 2022-04-05

## 2022-04-05 NOTE — TELEPHONE ENCOUNTER
Omnipod DASH (5PK PODS) requires PA to be completed through CoverMyMeds. Also please send escript to pt pharmacy for pods to be filled.      Thanks

## 2022-04-06 ENCOUNTER — READMISSION MANAGEMENT (OUTPATIENT)
Dept: CALL CENTER | Facility: HOSPITAL | Age: 42
End: 2022-04-06

## 2022-04-06 NOTE — OUTREACH NOTE
Medical Week 2 Survey    Flowsheet Row Responses   [facility] patient discharged from? [place]   Does the patient have one of the following disease processes/diagnoses(primary or secondary)? Other   Week 2 attempt successful? Yes   Call start time 1234   Discharge diagnosis chest pain - medical management, Hx CAD with multiple PCI, T2DM, chronic anticoagulation   Call end time 1240   Is patient permission given to speak with other caregiver? No   Meds reviewed with patient/caregiver? Yes   Does the patient have all medications ordered at discharge? Yes   Is the patient taking all medications as directed (includes completed medication regime)? Yes   Comments regarding appointments endocrinology 4/29/22   Does the patient have a primary care provider?  Yes   Comments regarding PCP PCP appt 4/12/22   Has the patient kept scheduled appointments due by today? N/A   Has home health visited the patient within 72 hours of discharge? N/A   Psychosocial issues? No   Did the patient receive a copy of their discharge instructions? Yes   Nursing interventions Reviewed instructions with patient   What is the patient's perception of their health status since discharge? Same   Is the patient/caregiver able to teach back signs and symptoms related to disease process for when to call PCP? Yes   Is the patient/caregiver able to teach back signs and symptoms related to disease process for when to call 911? Yes   Is the patient/caregiver able to teach back the hierarchy of who to call/visit for symptoms/problems? PCP, Specialist, Home health nurse, Urgent Care, ED, 911 Yes   Week 2 Call Completed? Yes          JAMAL EGAN - Registered Nurse

## 2022-04-07 ENCOUNTER — TELEPHONE (OUTPATIENT)
Dept: ENDOCRINOLOGY | Facility: CLINIC | Age: 42
End: 2022-04-07

## 2022-04-07 NOTE — TELEPHONE ENCOUNTER
Omnipod Sash (5pk) Pods were approved from 1/06/22 to 4/02/23. Approval for drug only. Sent to medical records on 4/7/22      Thanks

## 2022-04-11 ENCOUNTER — DOCUMENTATION (OUTPATIENT)
Dept: ENDOCRINOLOGY | Facility: CLINIC | Age: 42
End: 2022-04-11

## 2022-04-12 ENCOUNTER — OFFICE VISIT (OUTPATIENT)
Dept: FAMILY MEDICINE CLINIC | Facility: CLINIC | Age: 42
End: 2022-04-12

## 2022-04-12 ENCOUNTER — LAB (OUTPATIENT)
Dept: LAB | Facility: OTHER | Age: 42
End: 2022-04-12

## 2022-04-12 VITALS
WEIGHT: 315 LBS | HEIGHT: 71 IN | OXYGEN SATURATION: 97 % | SYSTOLIC BLOOD PRESSURE: 144 MMHG | HEART RATE: 73 BPM | DIASTOLIC BLOOD PRESSURE: 88 MMHG | BODY MASS INDEX: 44.1 KG/M2

## 2022-04-12 DIAGNOSIS — D64.9 ANEMIA, UNSPECIFIED TYPE: ICD-10-CM

## 2022-04-12 DIAGNOSIS — E11.42 DIABETIC PERIPHERAL NEUROPATHY ASSOCIATED WITH TYPE 2 DIABETES MELLITUS: ICD-10-CM

## 2022-04-12 DIAGNOSIS — M54.2 NECK PAIN: ICD-10-CM

## 2022-04-12 DIAGNOSIS — Z09 HOSPITAL DISCHARGE FOLLOW-UP: Primary | ICD-10-CM

## 2022-04-12 LAB
DEPRECATED RDW RBC AUTO: 42.2 FL (ref 37–54)
EOSINOPHIL # BLD MANUAL: 0.26 10*3/MM3 (ref 0–0.4)
EOSINOPHIL NFR BLD MANUAL: 4 % (ref 0.3–6.2)
ERYTHROCYTE [DISTWIDTH] IN BLOOD BY AUTOMATED COUNT: 13.8 % (ref 12.3–15.4)
HCT VFR BLD AUTO: 40.8 % (ref 37.5–51)
HGB BLD-MCNC: 13.8 G/DL (ref 13–17.7)
LYMPHOCYTES # BLD MANUAL: 1.22 10*3/MM3 (ref 0.7–3.1)
LYMPHOCYTES NFR BLD MANUAL: 9 % (ref 5–12)
MCH RBC QN AUTO: 28.9 PG (ref 26.6–33)
MCHC RBC AUTO-ENTMCNC: 33.8 G/DL (ref 31.5–35.7)
MCV RBC AUTO: 85.4 FL (ref 79–97)
MONOCYTES # BLD: 0.58 10*3/MM3 (ref 0.1–0.9)
NEUTROPHILS # BLD AUTO: 4.35 10*3/MM3 (ref 1.7–7)
NEUTROPHILS NFR BLD MANUAL: 68 % (ref 42.7–76)
PLATELET # BLD AUTO: 166 10*3/MM3 (ref 140–450)
PMV BLD AUTO: 8.9 FL (ref 6–12)
RBC # BLD AUTO: 4.78 10*6/MM3 (ref 4.14–5.8)
RBC MORPH BLD: NORMAL
SMALL PLATELETS BLD QL SMEAR: ADEQUATE
VARIANT LYMPHS NFR BLD MANUAL: 19 % (ref 19.6–45.3)
WBC MORPH BLD: NORMAL
WBC NRBC COR # BLD: 6.4 10*3/MM3 (ref 3.4–10.8)

## 2022-04-12 PROCEDURE — 84466 ASSAY OF TRANSFERRIN: CPT | Performed by: NURSE PRACTITIONER

## 2022-04-12 PROCEDURE — 82607 VITAMIN B-12: CPT | Performed by: NURSE PRACTITIONER

## 2022-04-12 PROCEDURE — 82746 ASSAY OF FOLIC ACID SERUM: CPT | Performed by: NURSE PRACTITIONER

## 2022-04-12 PROCEDURE — 85025 COMPLETE CBC W/AUTO DIFF WBC: CPT | Performed by: NURSE PRACTITIONER

## 2022-04-12 PROCEDURE — 82728 ASSAY OF FERRITIN: CPT | Performed by: NURSE PRACTITIONER

## 2022-04-12 PROCEDURE — 83540 ASSAY OF IRON: CPT | Performed by: NURSE PRACTITIONER

## 2022-04-12 PROCEDURE — 36415 COLL VENOUS BLD VENIPUNCTURE: CPT | Performed by: NURSE PRACTITIONER

## 2022-04-12 PROCEDURE — 99214 OFFICE O/P EST MOD 30 MIN: CPT | Performed by: NURSE PRACTITIONER

## 2022-04-12 RX ORDER — GABAPENTIN 300 MG/1
600 CAPSULE ORAL 3 TIMES DAILY
Qty: 180 CAPSULE | Refills: 0 | Status: CANCELLED | OUTPATIENT
Start: 2022-04-12

## 2022-04-12 NOTE — PROGRESS NOTES
CC: Hospital Follow Up Visit ( ER FU) and Chest Pain (Was having chest pain, and patient states that is has gotten better some )      Subjective:  Yordy Hansen is a 41 y.o. male who presents for         Patient presents to office for ER discharge follow-up.  He went to [facility] with complaints of chest pain on 4/8/2022.  A chest x-ray, CTA of chest, and labs were done.  His glucose was found to be 545 and his H&H was low and his A1c was 11.1.  He is followed by endocrinology for his diabetes mellitus. They are working on getting him an insulin pump. He has a long history of noncompliance.  Patient was treated with aspirin, Pepcid, regular insulin, and 1 L of sodium chloride and sent home. Was advised to follow up with his PCP. Pt states the chest pain has improved.     Patient has peripheral neuropathy.  He takes Neurontin 600 mg p.o. 3 times daily.  This does help with his neuropathy.  He is not quite due for refill on his Neurontin today.  It will be due on 4/21/2022.    He does c/o neck pain ongoing x 10 days now. Pain is sharp and constant. Radiates into the head. Rates pain at 5 on pain scale of 1-10. Treated with OTC headache medicine without resolution of the pain. States has had this before and was given nerve blocks for. Pt requesting referral to pain clinic for possible treatment with nerve block.      The following portions of the patient's history were reviewed and updated as appropriate: allergies, current medications, past family history, past medical history, past social history, past surgical history and problem list.    Past Medical History:   Diagnosis Date   • Asthma    • CAD (coronary artery disease)    • Chronic back pain    • Chronic pulmonary embolism (HCC)    • Coronary artery disease    • Diabetes mellitus (HCC)    • Factor II deficiency (HCC)    • Fatty liver    • GERD (gastroesophageal reflux disease)    • Hyperlipidemia    • Hypertension    •  "Morbid obesity (HCC)    • RLS (restless legs syndrome)    • Seizures (HCC)    • Sleep apnea          Current Outpatient Medications:   •  albuterol sulfate  (90 Base) MCG/ACT inhaler, Inhale 2 puffs Every 6 (Six) Hours As Needed for Wheezing or Shortness of Air., Disp: 18 g, Rfl: 0  •  amLODIPine (NORVASC) 5 MG tablet, Take 1 tablet by mouth Daily., Disp: 30 tablet, Rfl: 0  •  atorvastatin (LIPITOR) 80 MG tablet, Take 1 tablet by mouth Every Night., Disp: 90 tablet, Rfl: 3  •  dilTIAZem CD (Cardizem CD) 120 MG 24 hr capsule, Take 1 capsule by mouth Daily., Disp: 90 capsule, Rfl: 0  •  Dulaglutide (Trulicity) 3 MG/0.5ML solution pen-injector, Inject 0.5 mL under the skin into the appropriate area as directed 1 (One) Time Per Week., Disp: 5 pen, Rfl: 3  •  Evolocumab (Repatha SureClick) solution auto-injector SureClick injection, Inject 1 mL under the skin into the appropriate area as directed Every 14 (Fourteen) Days., Disp: 2 pen, Rfl: 5  •  fenofibrate micronized (LOFIBRA) 134 MG capsule, Take 1 capsule by mouth Every Morning Before Breakfast., Disp: 90 capsule, Rfl: 3  •  gabapentin (NEURONTIN) 300 MG capsule, Take 2 capsules by mouth 3 (Three) Times a Day., Disp: 180 capsule, Rfl: 0  •  glucose blood test strip, Use as instructed, Disp: 100 each, Rfl: 12  •  glucose monitor monitoring kit, 1 each As Needed (check blood glucose 3x daily)., Disp: 1 each, Rfl: 3  •  insulin glargine (LANTUS, SEMGLEE) 100 UNIT/ML injection, Inject 80 Units under the skin into the appropriate area as directed Every Night. Please give 3 months supply, Disp: 70 mL, Rfl: 3  •  insulin lispro (HumaLOG) 100 UNIT/ML injection, Inject 25 Units under the skin into the appropriate area as directed 3 (Three) Times a Day Before Meals., Disp: 30 mL, Rfl: 5  •  Insulin Syringe-Needle U-100 25G X 1\" 1 ML misc, 1 syringe 4 (Four) Times a Day With Meals & at Bedtime., Disp: 200 each, Rfl: 5  •  ipratropium-albuterol (DUO-NEB) 0.5-2.5 mg/3 ml " nebulizer, Take 3 mL by nebulization 4 (Four) Times a Day., Disp: 360 mL, Rfl: 1  •  lisinopril (PRINIVIL,ZESTRIL) 40 MG tablet, Take 1 tablet by mouth Every Night., Disp: 90 tablet, Rfl: 3  •  metoprolol succinate XL (TOPROL-XL) 100 MG 24 hr tablet, Take 1 tablet by mouth Daily., Disp: 30 tablet, Rfl: 0  •  Microlet Lancets misc, One touch delica lancets, Disp: 150 each, Rfl: 5  •  nitroglycerin (Nitro-Dur) 0.4 MG/HR patch, Place 1 patch on the skin as directed by provider Daily., Disp: 30 each, Rfl: 0  •  pantoprazole (PROTONIX) 40 MG EC tablet, Take 1 tablet by mouth Every Night., Disp: 90 tablet, Rfl: 3  •  pramipexole (MIRAPEX) 1 MG tablet, Take 2 tablets by mouth Every Night., Disp: 180 tablet, Rfl: 3  •  prasugrel (EFFIENT) 10 MG tablet, Take 1 tablet by mouth Daily., Disp: 90 tablet, Rfl: 3  •  ranolazine (RANEXA) 1000 MG 12 hr tablet, Take 1 tablet by mouth Every 12 (Twelve) Hours., Disp: 180 tablet, Rfl: 3  •  rivaroxaban (XARELTO) 20 MG tablet, Take 1 tablet by mouth Daily With Dinner., Disp: 90 tablet, Rfl: 3  •  SITagliptin (Januvia) 100 MG tablet, Take 1 tablet by mouth Daily., Disp: 90 tablet, Rfl: 3  •  traZODone (DESYREL) 300 MG tablet, Take 1 tablet by mouth every night at bedtime., Disp: 90 tablet, Rfl: 3  •  nitroglycerin (NITROSTAT) 0.4 MG SL tablet, Place 1 tablet under the tongue Every 5 (Five) Minutes As Needed for Chest Pain for up to 15 days., Disp: 25 tablet, Rfl: 6    Review of Systems    Review of Systems   Constitutional: Negative.    Eyes: Negative.    Respiratory: Positive for shortness of breath (on occasion).    Cardiovascular: Negative.  Negative for chest pain, palpitations and leg swelling.   Gastrointestinal: Negative.    Endocrine: Negative.    Genitourinary: Negative.    Musculoskeletal: Positive for neck pain.   Skin: Negative.    Allergic/Immunologic: Negative.    Neurological: Positive for headaches.   Hematological: Negative.    Psychiatric/Behavioral: Negative.    All  "other systems reviewed and are negative.      Objective  Vitals:    04/12/22 0933   BP: 144/88   Pulse: 73   SpO2: 97%   Weight: (!) 205 kg (451 lb)   Height: 180.3 cm (70.98\")     Body mass index is 62.94 kg/m².    Physical Exam    Physical Exam  Vitals and nursing note reviewed.   Constitutional:       General: He is not in acute distress.     Appearance: Normal appearance. He is obese. He is not ill-appearing, toxic-appearing or diaphoretic.   HENT:      Head: Normocephalic and atraumatic.   Neck:      Comments: ROM of the neck is limited secondary to pain. There is paraspinous muscle tenderness on the Left.  Cardiovascular:      Rate and Rhythm: Normal rate and regular rhythm.      Pulses: Normal pulses.      Heart sounds: Normal heart sounds. No murmur heard.    No friction rub. No gallop.   Pulmonary:      Effort: Pulmonary effort is normal. No respiratory distress.      Breath sounds: Normal breath sounds. No stridor. No wheezing, rhonchi or rales.   Chest:      Chest wall: No tenderness.   Musculoskeletal:      Cervical back: Neck supple. Tenderness present. No rigidity.      Right lower leg: No edema.      Left lower leg: No edema.   Lymphadenopathy:      Cervical: No cervical adenopathy.   Skin:     General: Skin is warm and dry.      Capillary Refill: Capillary refill takes less than 2 seconds.      Findings: No rash.   Neurological:      General: No focal deficit present.      Mental Status: He is alert and oriented to person, place, and time. Mental status is at baseline.             Diagnoses and all orders for this visit:    1. Hospital discharge follow-up (Primary)    2. Diabetic peripheral neuropathy associated with type 2 diabetes mellitus (HCC)    3. Anemia, unspecified type  -     Iron and TIBC; Future  -     Ferritin; Future  -     Folate; Future  -     CBC w AUTO Differential; Future  -     Vitamin B12; Future    4. Neck pain  -     Ambulatory Referral to Pain Management       Hospital ER " discharge follow-up performed today.  Patient to continue his current medications.  He is to keep scheduled appointments with cardiology.  Advised to keep his appointment with endocrinology for follow-up on his diabetes mellitus.  Patient with diabetic peripheral neuropathy.  His medication is not due for refill just yet.  Will refill when this is due. Patient understands the risks associated with this controlled medication, including tolerance and addiction.  he also agrees to only obtain this medication from me, and not from a another provider, unless that provider is covering for me in my absence.  he also agrees to be compliant in dosing, and not self adjust the dose of medication.  A signed controlled substance agreement is on file, and he has received a controlled substance education sheet at this a previous visit.  he has also signed a consent for treatment with a controlled substance as per [facility] policy. CARY was obtained.  Because patient was anemic on labs while in the ER.  We will check an anemia panel as above.  Will notify patient of these results once they are available.  For the neck pain will refer patient to pain management for further evaluation and treatment as requested.  Patient to follow-up in 3 months or sooner if needed.  At that time we will perform a complete exam with labs.  Answered all questions.  Patient verbalized understanding of plan of care.            This document has been electronically signed by LALA Munoz on April 12, 2022 09:58 CDT

## 2022-04-13 LAB
FERRITIN SERPL-MCNC: 228 NG/ML (ref 30–400)
FOLATE SERPL-MCNC: 11.5 NG/ML (ref 4.78–24.2)
IRON 24H UR-MRATE: 60 MCG/DL (ref 59–158)
IRON SATN MFR SERPL: 19 % (ref 20–50)
TIBC SERPL-MCNC: 322 MCG/DL (ref 298–536)
TRANSFERRIN SERPL-MCNC: 216 MG/DL (ref 200–360)
VIT B12 BLD-MCNC: 315 PG/ML (ref 211–946)

## 2022-04-15 ENCOUNTER — READMISSION MANAGEMENT (OUTPATIENT)
Dept: CALL CENTER | Facility: HOSPITAL | Age: 42
End: 2022-04-15

## 2022-04-15 NOTE — OUTREACH NOTE
Medical Week 3 Survey    Flowsheet Row Responses   [facility] patient discharged from? [place]   Does the patient have one of the following disease processes/diagnoses(primary or secondary)? Other   Week 3 attempt successful? No   Unsuccessful attempts Attempt 1          MARLA Medina Registered Nurse

## 2022-04-18 ENCOUNTER — READMISSION MANAGEMENT (OUTPATIENT)
Dept: CALL CENTER | Facility: HOSPITAL | Age: 42
End: 2022-04-18

## 2022-04-18 NOTE — OUTREACH NOTE
Medical Week 3 Survey    Flowsheet Row Responses   [facility] patient discharged from? [place]   Does the patient have one of the following disease processes/diagnoses(primary or secondary)? Other   Week 3 attempt successful? Yes   Call start time 1003   Call end time 1004   Discharge diagnosis chest pain - medical management, Hx CAD with multiple PCI, T2DM, chronic anticoagulation   Week 3 Call Completed? Yes   Graduated Yes   Is the patient interested in additional calls from an ambulatory ?  NOTE:  applies to high risk patients requiring additional follow-up. No   Did the patient feel the follow up calls were helpful during their recovery period? Yes   Was the number of calls appropriate? Yes   Graduated/Revoked comments Brief call, states he is doing OK.          BEN SNOWDEN - Registered Nurse

## 2022-04-25 DIAGNOSIS — Z79.4 TYPE 2 DIABETES MELLITUS WITH DIABETIC NEUROPATHY, WITH LONG-TERM CURRENT USE OF INSULIN: Chronic | ICD-10-CM

## 2022-04-25 DIAGNOSIS — E11.40 TYPE 2 DIABETES MELLITUS WITH DIABETIC NEUROPATHY, WITH LONG-TERM CURRENT USE OF INSULIN: Chronic | ICD-10-CM

## 2022-04-25 RX ORDER — GABAPENTIN 300 MG/1
600 CAPSULE ORAL 3 TIMES DAILY
Qty: 180 CAPSULE | Refills: 0 | Status: SHIPPED | OUTPATIENT
Start: 2022-04-25 | End: 2022-05-25 | Stop reason: SDUPTHER

## 2022-05-02 ENCOUNTER — OFFICE VISIT (OUTPATIENT)
Dept: FAMILY MEDICINE CLINIC | Facility: CLINIC | Age: 42
End: 2022-05-02

## 2022-05-02 VITALS
HEART RATE: 85 BPM | BODY MASS INDEX: 44.1 KG/M2 | OXYGEN SATURATION: 96 % | SYSTOLIC BLOOD PRESSURE: 142 MMHG | HEIGHT: 71 IN | WEIGHT: 315 LBS | DIASTOLIC BLOOD PRESSURE: 88 MMHG

## 2022-05-02 DIAGNOSIS — F41.9 ANXIETY: Primary | ICD-10-CM

## 2022-05-02 DIAGNOSIS — F43.21 GRIEF: ICD-10-CM

## 2022-05-02 PROCEDURE — 99213 OFFICE O/P EST LOW 20 MIN: CPT | Performed by: NURSE PRACTITIONER

## 2022-05-02 RX ORDER — BUSPIRONE HYDROCHLORIDE 10 MG/1
10 TABLET ORAL 3 TIMES DAILY PRN
Qty: 90 TABLET | Refills: 5 | Status: SHIPPED | OUTPATIENT
Start: 2022-05-02

## 2022-05-02 RX ORDER — FLUOXETINE HYDROCHLORIDE 20 MG/1
20 CAPSULE ORAL DAILY
Qty: 30 CAPSULE | Refills: 5 | Status: SHIPPED | OUTPATIENT
Start: 2022-05-02

## 2022-05-02 NOTE — PROGRESS NOTES
"CC: Anxiety (Mother passed away)      Subjective:  Yordy Hansen is a 41 y.o. male who presents for         Patient presents to office with complaints of anxiety.  His mother passed away 4 days ago. This was sudden. Pt is crying. Pt states he can't talk about this without losing his breath. He states \"I don't know what to do.\"  He feels down and upset all the time. States he doesn't know if he can handle people talking the him at the  home. Denies SI/HI.  Anxiety  Presents for initial visit. Onset was in the past 7 days. The problem has been unchanged. Symptoms include chest pain, decreased concentration, excessive worry, nervous/anxious behavior and palpitations. Patient reports no confusion, depressed mood, feeling of choking, hyperventilation, insomnia, irritability, panic, restlessness, shortness of breath or suicidal ideas. Symptoms occur constantly. The severity of symptoms is interfering with daily activities. The symptoms are aggravated by family issues. The quality of sleep is fair. Nighttime awakenings: occasional.     Treatments tried: Has been treated years ago but can't remember what it was. The treatment provided significant relief.       The following portions of the patient's history were reviewed and updated as appropriate: allergies, current medications, past family history, past medical history, past social history, past surgical history and problem list.    Past Medical History:   Diagnosis Date   • Asthma    • CAD (coronary artery disease)    • Chronic back pain    • Chronic pulmonary embolism (HCC)    • Coronary artery disease    • Diabetes mellitus (HCC)    • Factor II deficiency (HCC)    • Fatty liver    • GERD (gastroesophageal reflux disease)    • Hyperlipidemia    • Hypertension    • Morbid obesity ([facility])    • RLS (restless legs syndrome)    • Seizures ([facility])    • Sleep apnea          Current Outpatient Medications:   •  albuterol sulfate  (90 Base) MCG/ACT inhaler, Inhale " "2 puffs Every 6 (Six) Hours As Needed for Wheezing or Shortness of Air., Disp: 18 g, Rfl: 0  •  amLODIPine (NORVASC) 5 MG tablet, Take 1 tablet by mouth Daily., Disp: 30 tablet, Rfl: 0  •  atorvastatin (LIPITOR) 80 MG tablet, Take 1 tablet by mouth Every Night., Disp: 90 tablet, Rfl: 3  •  dilTIAZem CD (Cardizem CD) 120 MG 24 hr capsule, Take 1 capsule by mouth Daily., Disp: 90 capsule, Rfl: 0  •  Dulaglutide (Trulicity) 3 MG/0.5ML solution pen-injector, Inject 0.5 mL under the skin into the appropriate area as directed 1 (One) Time Per Week., Disp: 5 pen, Rfl: 3  •  Evolocumab (Repatha SureClick) solution auto-injector SureClick injection, Inject 1 mL under the skin into the appropriate area as directed Every 14 (Fourteen) Days., Disp: 2 pen, Rfl: 5  •  fenofibrate micronized (LOFIBRA) 134 MG capsule, Take 1 capsule by mouth Every Morning Before Breakfast., Disp: 90 capsule, Rfl: 3  •  gabapentin (NEURONTIN) 300 MG capsule, Take 2 capsules by mouth 3 (Three) Times a Day., Disp: 180 capsule, Rfl: 0  •  glucose blood test strip, Use as instructed, Disp: 100 each, Rfl: 12  •  glucose monitor monitoring kit, 1 each As Needed (check blood glucose 3x daily)., Disp: 1 each, Rfl: 3  •  insulin glargine (LANTUS, SEMGLEE) 100 UNIT/ML injection, Inject 80 Units under the skin into the appropriate area as directed Every Night. Please give 3 months supply, Disp: 70 mL, Rfl: 3  •  insulin lispro (HumaLOG) 100 UNIT/ML injection, Inject 25 Units under the skin into the appropriate area as directed 3 (Three) Times a Day Before Meals., Disp: 30 mL, Rfl: 5  •  Insulin Syringe-Needle U-100 25G X 1\" 1 ML misc, 1 syringe 4 (Four) Times a Day With Meals & at Bedtime., Disp: 200 each, Rfl: 5  •  ipratropium-albuterol (DUO-NEB) 0.5-2.5 mg/3 ml nebulizer, Take 3 mL by nebulization 4 (Four) Times a Day., Disp: 360 mL, Rfl: 1  •  lisinopril (PRINIVIL,ZESTRIL) 40 MG tablet, Take 1 tablet by mouth Every Night., Disp: 90 tablet, Rfl: 3  •  " metoprolol succinate XL (TOPROL-XL) 100 MG 24 hr tablet, Take 1 tablet by mouth Daily., Disp: 30 tablet, Rfl: 0  •  Microlet Lancets misc, One touch delica lancets, Disp: 150 each, Rfl: 5  •  nitroglycerin (Nitro-Dur) 0.4 MG/HR patch, Place 1 patch on the skin as directed by provider Daily., Disp: 30 each, Rfl: 0  •  pantoprazole (PROTONIX) 40 MG EC tablet, Take 1 tablet by mouth Every Night., Disp: 90 tablet, Rfl: 3  •  pramipexole (MIRAPEX) 1 MG tablet, Take 2 tablets by mouth Every Night., Disp: 180 tablet, Rfl: 3  •  prasugrel (EFFIENT) 10 MG tablet, Take 1 tablet by mouth Daily., Disp: 90 tablet, Rfl: 3  •  ranolazine (RANEXA) 1000 MG 12 hr tablet, Take 1 tablet by mouth Every 12 (Twelve) Hours., Disp: 180 tablet, Rfl: 3  •  rivaroxaban (XARELTO) 20 MG tablet, Take 1 tablet by mouth Daily With Dinner., Disp: 90 tablet, Rfl: 3  •  SITagliptin (Januvia) 100 MG tablet, Take 1 tablet by mouth Daily., Disp: 90 tablet, Rfl: 3  •  traZODone (DESYREL) 300 MG tablet, Take 1 tablet by mouth every night at bedtime., Disp: 90 tablet, Rfl: 3  •  busPIRone (BUSPAR) 10 MG tablet, Take 1 tablet by mouth 3 (Three) Times a Day As Needed (Anxiety)., Disp: 90 tablet, Rfl: 5  •  FLUoxetine (PROzac) 20 MG capsule, Take 1 capsule by mouth Daily., Disp: 30 capsule, Rfl: 5  •  nitroglycerin (NITROSTAT) 0.4 MG SL tablet, Place 1 tablet under the tongue Every 5 (Five) Minutes As Needed for Chest Pain for up to 15 days., Disp: 25 tablet, Rfl: 6    Review of Systems    Review of Systems   Constitutional: Negative.  Negative for irritability.   HENT: Negative.    Eyes: Negative.    Respiratory: Negative.  Negative for shortness of breath.    Cardiovascular: Positive for chest pain and palpitations.   Gastrointestinal: Negative.    Endocrine: Negative.    Genitourinary: Negative.    Musculoskeletal: Negative.    Skin: Negative.    Allergic/Immunologic: Negative.    Neurological: Negative.    Hematological: Negative.   "  Psychiatric/Behavioral: Positive for decreased concentration. Negative for agitation, confusion, dysphoric mood, self-injury, sleep disturbance and suicidal ideas. The patient is nervous/anxious. The patient does not have insomnia and is not hyperactive.    All other systems reviewed and are negative.      Objective  Vitals:    05/02/22 0927   BP: 142/88   Pulse: 85   SpO2: 96%   Weight: (!) 201 kg (443 lb 3.2 oz)   Height: 180.3 cm (70.98\")     Body mass index is 61.85 kg/m².    Physical Exam    Physical Exam  Vitals and nursing note reviewed.   Constitutional:       General: He is not in acute distress.     Appearance: Normal appearance. He is not ill-appearing, toxic-appearing or diaphoretic.   HENT:      Head: Normocephalic and atraumatic.   Cardiovascular:      Rate and Rhythm: Normal rate and regular rhythm.      Pulses: Normal pulses.      Heart sounds: Normal heart sounds. No murmur heard.    No friction rub. No gallop.   Pulmonary:      Effort: Pulmonary effort is normal. No respiratory distress.      Breath sounds: Normal breath sounds. No stridor. No wheezing, rhonchi or rales.   Chest:      Chest wall: No tenderness.   Musculoskeletal:      Cervical back: Normal range of motion and neck supple.   Neurological:      Mental Status: He is alert.   Psychiatric:         Attention and Perception: Attention and perception normal.         Mood and Affect: Mood is anxious.         Speech: Speech normal.         Behavior: Behavior normal. Behavior is cooperative.         Thought Content: Thought content normal.         Cognition and Memory: Cognition normal.         Judgment: Judgment normal.      Comments: Pt appears anxious. Tearful at times. Good eye contact. Answers questions appropriately.             Diagnoses and all orders for this visit:    1. Anxiety (Primary)  -     FLUoxetine (PROzac) 20 MG capsule; Take 1 capsule by mouth Daily.  Dispense: 30 capsule; Refill: 5  -     busPIRone (BUSPAR) 10 MG " tablet; Take 1 tablet by mouth 3 (Three) Times a Day As Needed (Anxiety).  Dispense: 90 tablet; Refill: 5  -     Ambulatory Referral to Psychiatry    2. Grief  -     FLUoxetine (PROzac) 20 MG capsule; Take 1 capsule by mouth Daily.  Dispense: 30 capsule; Refill: 5  -     busPIRone (BUSPAR) 10 MG tablet; Take 1 tablet by mouth 3 (Three) Times a Day As Needed (Anxiety).  Dispense: 90 tablet; Refill: 5  -     Ambulatory Referral to Psychiatry       EDD-7 score of 17 today and PHQ-9 score of 5.  For the anxiety and grief, will start patient on Prozac and BuSpar.  Patient instructed on how to take these medications and possible side effects of each.  We will also refer patient to psychiatry as requested for counseling.  Patient has a follow-up appointment in July 2022.  Patient advised to keep this appointment or follow-up sooner if needed.  Answered all questions.  Patient verbalized understanding of plan of care.        This document has been electronically signed by LALA Munoz on May 2, 2022 09:46 CDT

## 2022-05-19 DIAGNOSIS — Z79.4 TYPE 2 DIABETES MELLITUS WITH DIABETIC NEUROPATHY, WITH LONG-TERM CURRENT USE OF INSULIN: Chronic | ICD-10-CM

## 2022-05-19 DIAGNOSIS — E11.40 TYPE 2 DIABETES MELLITUS WITH DIABETIC NEUROPATHY, WITH LONG-TERM CURRENT USE OF INSULIN: Chronic | ICD-10-CM

## 2022-05-19 RX ORDER — GABAPENTIN 300 MG/1
600 CAPSULE ORAL 3 TIMES DAILY
Qty: 180 CAPSULE | Refills: 0 | OUTPATIENT
Start: 2022-05-19

## 2022-05-24 RX ORDER — LANCETS 30 GAUGE
EACH MISCELLANEOUS
Qty: 120 EACH | Refills: 11 | Status: SHIPPED | OUTPATIENT
Start: 2022-05-24

## 2022-05-24 RX ORDER — BLOOD-GLUCOSE METER
1 KIT MISCELLANEOUS AS NEEDED
Qty: 1 EACH | Refills: 0 | Status: SHIPPED | OUTPATIENT
Start: 2022-05-24 | End: 2022-07-26 | Stop reason: SDUPTHER

## 2022-05-25 DIAGNOSIS — Z79.4 TYPE 2 DIABETES MELLITUS WITH DIABETIC NEUROPATHY, WITH LONG-TERM CURRENT USE OF INSULIN: Chronic | ICD-10-CM

## 2022-05-25 DIAGNOSIS — E11.40 TYPE 2 DIABETES MELLITUS WITH DIABETIC NEUROPATHY, WITH LONG-TERM CURRENT USE OF INSULIN: Chronic | ICD-10-CM

## 2022-05-25 RX ORDER — GABAPENTIN 300 MG/1
600 CAPSULE ORAL 3 TIMES DAILY
Qty: 180 CAPSULE | Refills: 0 | Status: SHIPPED | OUTPATIENT
Start: 2022-05-25 | End: 2022-06-27

## 2022-05-31 ENCOUNTER — TELEPHONE (OUTPATIENT)
Dept: ENDOCRINOLOGY | Facility: CLINIC | Age: 42
End: 2022-05-31

## 2022-05-31 DIAGNOSIS — E11.65 TYPE 2 DIABETES MELLITUS WITH HYPERGLYCEMIA, WITH LONG-TERM CURRENT USE OF INSULIN: ICD-10-CM

## 2022-05-31 DIAGNOSIS — Z79.4 TYPE 2 DIABETES MELLITUS WITH HYPERGLYCEMIA, WITH LONG-TERM CURRENT USE OF INSULIN: ICD-10-CM

## 2022-05-31 RX ORDER — INSULIN PUMP CONTROLLER
1 EACH MISCELLANEOUS DAILY
Qty: 30 EACH | Refills: 11 | Status: SHIPPED | OUTPATIENT
Start: 2022-05-31 | End: 2022-08-16 | Stop reason: SDUPTHER

## 2022-06-24 DIAGNOSIS — E11.40 TYPE 2 DIABETES MELLITUS WITH DIABETIC NEUROPATHY, WITH LONG-TERM CURRENT USE OF INSULIN: Chronic | ICD-10-CM

## 2022-06-24 DIAGNOSIS — Z79.4 TYPE 2 DIABETES MELLITUS WITH DIABETIC NEUROPATHY, WITH LONG-TERM CURRENT USE OF INSULIN: Chronic | ICD-10-CM

## 2022-06-27 RX ORDER — GABAPENTIN 300 MG/1
600 CAPSULE ORAL 3 TIMES DAILY
Qty: 180 CAPSULE | Refills: 0 | Status: SHIPPED | OUTPATIENT
Start: 2022-06-27 | End: 2022-07-26 | Stop reason: SDUPTHER

## 2022-07-08 DIAGNOSIS — G47.00 INSOMNIA, UNSPECIFIED TYPE: Chronic | ICD-10-CM

## 2022-07-08 DIAGNOSIS — G25.81 RLS (RESTLESS LEGS SYNDROME): Chronic | ICD-10-CM

## 2022-07-11 RX ORDER — TRAZODONE HYDROCHLORIDE 300 MG/1
300 TABLET ORAL
Qty: 90 TABLET | Refills: 3 | OUTPATIENT
Start: 2022-07-11

## 2022-07-11 RX ORDER — PRAMIPEXOLE DIHYDROCHLORIDE 1 MG/1
2 TABLET ORAL NIGHTLY
Qty: 180 TABLET | Refills: 3 | OUTPATIENT
Start: 2022-07-11

## 2022-07-26 ENCOUNTER — LAB (OUTPATIENT)
Dept: LAB | Facility: OTHER | Age: 42
End: 2022-07-26

## 2022-07-26 ENCOUNTER — OFFICE VISIT (OUTPATIENT)
Dept: FAMILY MEDICINE CLINIC | Facility: CLINIC | Age: 42
End: 2022-07-26

## 2022-07-26 VITALS
WEIGHT: 315 LBS | SYSTOLIC BLOOD PRESSURE: 140 MMHG | HEIGHT: 71 IN | HEART RATE: 69 BPM | OXYGEN SATURATION: 96 % | BODY MASS INDEX: 44.1 KG/M2 | DIASTOLIC BLOOD PRESSURE: 86 MMHG

## 2022-07-26 DIAGNOSIS — F41.9 ANXIETY: Chronic | ICD-10-CM

## 2022-07-26 DIAGNOSIS — I27.82 CHRONIC PULMONARY EMBOLISM, UNSPECIFIED PULMONARY EMBOLISM TYPE, UNSPECIFIED WHETHER ACUTE COR PULMONALE PRESENT: Chronic | ICD-10-CM

## 2022-07-26 DIAGNOSIS — D68.2 FACTOR II DEFICIENCY: Chronic | ICD-10-CM

## 2022-07-26 DIAGNOSIS — J45.20 MILD INTERMITTENT ASTHMA WITHOUT COMPLICATION: Chronic | ICD-10-CM

## 2022-07-26 DIAGNOSIS — E11.42 DIABETIC PERIPHERAL NEUROPATHY ASSOCIATED WITH TYPE 2 DIABETES MELLITUS: Chronic | ICD-10-CM

## 2022-07-26 DIAGNOSIS — E78.5 HYPERLIPIDEMIA, UNSPECIFIED HYPERLIPIDEMIA TYPE: Chronic | ICD-10-CM

## 2022-07-26 DIAGNOSIS — E53.8 B12 DEFICIENCY: Chronic | ICD-10-CM

## 2022-07-26 DIAGNOSIS — I25.118 ATHEROSCLEROTIC HEART DISEASE OF NATIVE CORONARY ARTERY WITH OTHER FORMS OF ANGINA PECTORIS: Chronic | ICD-10-CM

## 2022-07-26 DIAGNOSIS — E66.01 CLASS 3 SEVERE OBESITY DUE TO EXCESS CALORIES WITH SERIOUS COMORBIDITY AND BODY MASS INDEX (BMI) OF 60.0 TO 69.9 IN ADULT: Chronic | ICD-10-CM

## 2022-07-26 DIAGNOSIS — Z79.4 TYPE 2 DIABETES MELLITUS WITH HYPERGLYCEMIA, WITH LONG-TERM CURRENT USE OF INSULIN: Chronic | ICD-10-CM

## 2022-07-26 DIAGNOSIS — Z00.00 MEDICARE ANNUAL WELLNESS VISIT, SUBSEQUENT: Primary | ICD-10-CM

## 2022-07-26 DIAGNOSIS — E11.65 TYPE 2 DIABETES MELLITUS WITH HYPERGLYCEMIA, WITH LONG-TERM CURRENT USE OF INSULIN: Chronic | ICD-10-CM

## 2022-07-26 DIAGNOSIS — I10 ESSENTIAL HYPERTENSION: Chronic | ICD-10-CM

## 2022-07-26 DIAGNOSIS — Z00.00 MEDICARE ANNUAL WELLNESS VISIT, SUBSEQUENT: ICD-10-CM

## 2022-07-26 DIAGNOSIS — E11.40 TYPE 2 DIABETES MELLITUS WITH DIABETIC NEUROPATHY, WITH LONG-TERM CURRENT USE OF INSULIN: Chronic | ICD-10-CM

## 2022-07-26 DIAGNOSIS — K21.9 GASTROESOPHAGEAL REFLUX DISEASE WITHOUT ESOPHAGITIS: Chronic | ICD-10-CM

## 2022-07-26 DIAGNOSIS — Z79.4 TYPE 2 DIABETES MELLITUS WITH DIABETIC NEUROPATHY, WITH LONG-TERM CURRENT USE OF INSULIN: Chronic | ICD-10-CM

## 2022-07-26 LAB
ALBUMIN SERPL-MCNC: 3.7 G/DL (ref 3.5–5)
ALBUMIN/GLOB SERPL: 1 G/DL (ref 1.1–1.8)
ALP SERPL-CCNC: 64 U/L (ref 38–126)
ALT SERPL W P-5'-P-CCNC: 30 U/L
ANION GAP SERPL CALCULATED.3IONS-SCNC: 7 MMOL/L (ref 5–15)
AST SERPL-CCNC: 40 U/L (ref 17–59)
BILIRUB SERPL-MCNC: 0.4 MG/DL (ref 0.2–1.3)
BUN SERPL-MCNC: 10 MG/DL (ref 7–23)
BUN/CREAT SERPL: 12.2 (ref 7–25)
CALCIUM SPEC-SCNC: 9.1 MG/DL (ref 8.4–10.2)
CHLORIDE SERPL-SCNC: 102 MMOL/L (ref 101–112)
CO2 SERPL-SCNC: 32 MMOL/L (ref 22–30)
CREAT SERPL-MCNC: 0.82 MG/DL (ref 0.7–1.3)
DEPRECATED RDW RBC AUTO: 44.8 FL (ref 37–54)
EGFRCR SERPLBLD CKD-EPI 2021: 111.8 ML/MIN/1.73
EOSINOPHIL # BLD MANUAL: 0.29 10*3/MM3 (ref 0–0.4)
EOSINOPHIL NFR BLD MANUAL: 4 % (ref 0.3–6.2)
ERYTHROCYTE [DISTWIDTH] IN BLOOD BY AUTOMATED COUNT: 14.9 % (ref 12.3–15.4)
GLOBULIN UR ELPH-MCNC: 3.6 GM/DL (ref 2.3–3.5)
GLUCOSE SERPL-MCNC: 106 MG/DL (ref 70–99)
HCT VFR BLD AUTO: 39.2 % (ref 37.5–51)
HGB BLD-MCNC: 13.3 G/DL (ref 13–17.7)
LYMPHOCYTES # BLD MANUAL: 1.25 10*3/MM3 (ref 0.7–3.1)
LYMPHOCYTES NFR BLD MANUAL: 6 % (ref 5–12)
MCH RBC QN AUTO: 29 PG (ref 26.6–33)
MCHC RBC AUTO-ENTMCNC: 33.9 G/DL (ref 31.5–35.7)
MCV RBC AUTO: 85.4 FL (ref 79–97)
MONOCYTES # BLD: 0.44 10*3/MM3 (ref 0.1–0.9)
NEUTROPHILS # BLD AUTO: 5.37 10*3/MM3 (ref 1.7–7)
NEUTROPHILS NFR BLD MANUAL: 73 % (ref 42.7–76)
PLATELET # BLD AUTO: 185 10*3/MM3 (ref 140–450)
PMV BLD AUTO: 8.4 FL (ref 6–12)
POTASSIUM SERPL-SCNC: 4.1 MMOL/L (ref 3.4–5)
PROT SERPL-MCNC: 7.3 G/DL (ref 6.3–8.6)
RBC # BLD AUTO: 4.59 10*6/MM3 (ref 4.14–5.8)
RBC MORPH BLD: NORMAL
SMALL PLATELETS BLD QL SMEAR: ADEQUATE
SODIUM SERPL-SCNC: 141 MMOL/L (ref 137–145)
VARIANT LYMPHS NFR BLD MANUAL: 17 % (ref 19.6–45.3)
WBC MORPH BLD: NORMAL
WBC NRBC COR # BLD: 7.36 10*3/MM3 (ref 3.4–10.8)

## 2022-07-26 PROCEDURE — 85025 COMPLETE CBC W/AUTO DIFF WBC: CPT | Performed by: NURSE PRACTITIONER

## 2022-07-26 PROCEDURE — G0439 PPPS, SUBSEQ VISIT: HCPCS | Performed by: NURSE PRACTITIONER

## 2022-07-26 PROCEDURE — 82607 VITAMIN B-12: CPT | Performed by: NURSE PRACTITIONER

## 2022-07-26 PROCEDURE — 36415 COLL VENOUS BLD VENIPUNCTURE: CPT | Performed by: NURSE PRACTITIONER

## 2022-07-26 PROCEDURE — 83036 HEMOGLOBIN GLYCOSYLATED A1C: CPT | Performed by: NURSE PRACTITIONER

## 2022-07-26 PROCEDURE — 82043 UR ALBUMIN QUANTITATIVE: CPT | Performed by: NURSE PRACTITIONER

## 2022-07-26 PROCEDURE — 1159F MED LIST DOCD IN RCRD: CPT | Performed by: NURSE PRACTITIONER

## 2022-07-26 PROCEDURE — 84443 ASSAY THYROID STIM HORMONE: CPT | Performed by: NURSE PRACTITIONER

## 2022-07-26 PROCEDURE — 80061 LIPID PANEL: CPT | Performed by: NURSE PRACTITIONER

## 2022-07-26 PROCEDURE — 80053 COMPREHEN METABOLIC PANEL: CPT | Performed by: NURSE PRACTITIONER

## 2022-07-26 RX ORDER — GABAPENTIN 300 MG/1
600 CAPSULE ORAL 3 TIMES DAILY
Qty: 180 CAPSULE | Refills: 0 | Status: SHIPPED | OUTPATIENT
Start: 2022-07-26 | End: 2022-09-15 | Stop reason: SDUPTHER

## 2022-07-26 RX ORDER — TAMSULOSIN HYDROCHLORIDE 0.4 MG/1
0.4 CAPSULE ORAL DAILY
COMMUNITY
Start: 2022-06-29 | End: 2023-06-30

## 2022-07-26 RX ORDER — LISINOPRIL 10 MG/1
10 TABLET ORAL DAILY
COMMUNITY
Start: 2022-07-24 | End: 2023-07-25

## 2022-07-26 RX ORDER — METOPROLOL SUCCINATE 50 MG/1
50 TABLET, EXTENDED RELEASE ORAL
COMMUNITY
Start: 2022-07-23 | End: 2022-10-03

## 2022-07-26 RX ORDER — ROSUVASTATIN CALCIUM 40 MG/1
40 TABLET, COATED ORAL
COMMUNITY
Start: 2022-07-20 | End: 2022-10-19

## 2022-07-26 RX ORDER — FENOFIBRATE 145 MG/1
145 TABLET, COATED ORAL DAILY
COMMUNITY
Start: 2022-07-23 | End: 2023-07-24

## 2022-07-26 RX ORDER — INSULIN LISPRO 100 [IU]/ML
INJECTION, SOLUTION INTRAVENOUS; SUBCUTANEOUS
COMMUNITY
Start: 2022-06-24

## 2022-07-26 NOTE — PROGRESS NOTES
The ABCs of the Annual Wellness Visit  Subsequent Medicare Wellness Visit    Chief Complaint   Patient presents with   • Medicare Wellness-subsequent      Subjective    History of Present Illness:  Yordy Hansen is a 41 y.o. male who presents for a Subsequent Medicare Wellness Visit.    The following portions of the patient's history were reviewed and   updated as appropriate: allergies, current medications, past family history, past medical history, past social history, past surgical history and problem list.    Compared to one year ago, the patient feels his physical   health is worse.    Compared to one year ago, the patient feels his mental   health is the same.    Recent Hospitalizations:  This patient has had a [facility] admission record on file within the last 365 days.    Current Medical Providers:  Patient Care Team:  Mami Perez APRN as PCP - General (Family Medicine)    Outpatient Medications Prior to Visit   Medication Sig Dispense Refill   • albuterol sulfate  (90 Base) MCG/ACT inhaler Inhale 2 puffs Every 6 (Six) Hours As Needed for Wheezing or Shortness of Air. 18 g 0   • amLODIPine (NORVASC) 5 MG tablet Take 1 tablet by mouth Daily. 30 tablet 0   • atorvastatin (LIPITOR) 80 MG tablet Take 1 tablet by mouth Every Night. 90 tablet 3   • busPIRone (BUSPAR) 10 MG tablet Take 1 tablet by mouth 3 (Three) Times a Day As Needed (Anxiety). 90 tablet 5   • busPIRone (BUSPAR) 10 MG tablet Take 10 mg by mouth 3 (Three) Times a Day.     • dilTIAZem CD (Cardizem CD) 120 MG 24 hr capsule Take 1 capsule by mouth Daily. 90 capsule 0   • Dulaglutide (Trulicity) 3 MG/0.5ML solution pen-injector Inject 0.5 mL under the skin into the appropriate area as directed 1 (One) Time Per Week. 5 pen 3   • Evolocumab (Repatha SureClick) solution auto-injector SureClick injection Inject 1 mL under the skin into the appropriate area as directed Every 14 (Fourteen) Days. 2 pen 5   • fenofibrate micronized  "(LOFIBRA) 134 MG capsule Take 1 capsule by mouth Every Morning Before Breakfast. 90 capsule 3   • FLUoxetine (PROzac) 20 MG capsule Take 1 capsule by mouth Daily. 30 capsule 5   • gabapentin (NEURONTIN) 300 MG capsule TAKE 2 CAPSULES BY MOUTH 3 (THREE) TIMES A DAY. 180 capsule 0   • glucose blood test strip Use as instructed 100 each 12   • glucose blood test strip Test 4 times daily ,E10.65 120 each 11   • glucose monitor monitoring kit 1 each As Needed (check blood glucose 3x daily). 1 each 3   • glucose monitor monitoring kit 1 each As Needed (to check bg). 1 each 0   • Insulin Disposable Pump (Omnipod DASH Pods, Gen 4,) misc 1 package Daily. 30 each 11   • insulin glargine (LANTUS, SEMGLEE) 100 UNIT/ML injection Inject 80 Units under the skin into the appropriate area as directed Every Night. Please give 3 months supply 70 mL 3   • insulin lispro (HumaLOG) 100 UNIT/ML injection 150 units daily through pump 60 mL 5   • Insulin Syringe-Needle U-100 25G X 1\" 1 ML misc 1 syringe 4 (Four) Times a Day With Meals & at Bedtime. 200 each 5   • ipratropium-albuterol (DUO-NEB) 0.5-2.5 mg/3 ml nebulizer Take 3 mL by nebulization 4 (Four) Times a Day. 360 mL 1   • Lancets misc Test 4 times daily,E10.65 120 each 11   • lisinopril (PRINIVIL,ZESTRIL) 40 MG tablet Take 1 tablet by mouth Every Night. 90 tablet 3   • metoprolol succinate XL (TOPROL-XL) 100 MG 24 hr tablet Take 1 tablet by mouth Daily. 30 tablet 0   • Microlet Lancets misc One touch delica lancets 150 each 5   • nitroglycerin (Nitro-Dur) 0.4 MG/HR patch Place 1 patch on the skin as directed by provider Daily. 30 each 0   • nitroglycerin (NITROSTAT) 0.4 MG SL tablet Place 1 tablet under the tongue Every 5 (Five) Minutes As Needed for Chest Pain for up to 15 days. 25 tablet 6   • pantoprazole (PROTONIX) 40 MG EC tablet Take 1 tablet by mouth Every Night. 90 tablet 3   • pramipexole (MIRAPEX) 1 MG tablet Take 2 tablets by mouth Every Night. 180 tablet 3   • prasugrel " (EFFIENT) 10 MG tablet Take 1 tablet by mouth Daily. 90 tablet 3   • ranolazine (RANEXA) 1000 MG 12 hr tablet Take 1 tablet by mouth Every 12 (Twelve) Hours. 180 tablet 3   • rivaroxaban (XARELTO) 20 MG tablet Take 1 tablet by mouth Daily With Dinner. 90 tablet 3   • SITagliptin (Januvia) 100 MG tablet Take 1 tablet by mouth Daily. 90 tablet 3   • traZODone (DESYREL) 300 MG tablet Take 1 tablet by mouth every night at bedtime. 90 tablet 3     No facility-administered medications prior to visit.       No opioid medication identified on active medication list. I have reviewed chart for other potential  high risk medication/s and harmful drug interactions in the elderly.          Aspirin is not on active medication list.  Aspirin use is contraindicated for this patient due to: current use of Xarelto.  .    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[facility])   • Shortness of breath   • Precordial chest pain   • Anemia   • Chronic abdominal pain   • Nausea   • Status post coronary artery stent placement   • Class 3 severe obesity due to excess calories with serious comorbidity and body mass index (BMI) of 60.0 to 69.9 in adult ([facility])   • Type 2 diabetes mellitus without complication, with long-term current use of insulin ([facility])   • Coronary artery disease of native artery with stable angina pectoris ([facility])   • Sepsis, unspecified organism ([facility])   • Chest pain, rule out acute myocardial infarction   • Accelerated hypertension   • Chest pain with high risk of acute coronary syndrome   • Atherosclerotic heart disease of native coronary artery with other forms of angina pectoris ([facility])   • Morbid obesity ([facility])   • Paroxysmal atrial fibrillation ([facility])   • Uncontrolled hypertension   • Type 2 diabetes mellitus with hyperglycemia, with long-term current use of insulin ([facility])   • Atrial fibrillation with RVR ([facility])   •  "A-fib (HCC)   • Chest pain   • COVID-19 virus infection   • COVID-19   • History of coronary artery disease   • Anxiety   • Failure of outpatient treatment   • History of DVT (deep vein thrombosis)   • History of pulmonary embolism   • Hyperphosphatemia   • Hyponatremia   • Induration of skin   • Low magnesium level   • Migraine   • Neuropathy   • Non-compliant behavior   • Osteoarthritis of lumbar spine   • Palpitations   • Pure hypercholesterolemia   • Seasonal allergies   • Spinal stenosis of thoracolumbar region   • Right lower lobe pneumonia   • Noncompliance   • Diabetic peripheral neuropathy associated with type 2 diabetes mellitus (HCC)   • Muscle spasm   • Chest pain with high risk for cardiac etiology   • Grief     Advance Care Planning  Advance Directive is not on file.  ACP discussion was held with the patient during this visit. Patient does not have an advance directive, declines further assistance.          Objective    There were no vitals filed for this visit.  Estimated body mass index is 61.79 kg/m² as calculated from the following:    Height as of 5/18/22: 180.3 cm (71\").    Weight as of 5/18/22: 201 kg (443 lb).    Class 3 Severe Obesity (BMI >=40). Obesity-related health conditions include the following: obstructive sleep apnea, hypertension, coronary heart disease, diabetes mellitus, dyslipidemias, GERD and osteoarthritis. Obesity is unchanged. BMI is is above average; BMI management plan is completed. We discussed low calorie, low carb based diet program, portion control and increasing exercise.      Does the patient have evidence of cognitive impairment? No    Physical Exam  Lab Results   Component Value Date     (H) 07/23/2022    TRIG 313 (H) 07/19/2022    HDL 37 07/19/2022     (H) 07/19/2022    HGBA1C 9.0 (H) 07/21/2022            HEALTH RISK ASSESSMENT    Smoking Status:  Social History     Tobacco Use   Smoking Status Former Smoker   • Packs/day: 0.25   • Years: 0.50   • " Pack years: 0.12   • Types: Cigarettes   • Quit date:    • Years since quittin.5   Smokeless Tobacco Current User   • Types: Snuff     Alcohol Consumption:  Social History     Substance and Sexual Activity   Alcohol Use No     Fall Risk Screen:    WINSTON Fall Risk Assessment has not been completed.    Depression Screening:  PHQ-2/PHQ-9 Depression Screening 2022   Retired PHQ-9 Total Score -   Retired Total Score -   Little Interest or Pleasure in Doing Things 1-->several days   Feeling Down, Depressed or Hopeless 2-->more than half the days   Trouble Falling or Staying Asleep, or Sleeping Too Much 0-->not at all   Feeling Tired or Having Little Energy 0-->not at all   Poor Appetite or Overeating 1-->several days   Feeling Bad about Yourself - or that You are a Failure or Have Let Yourself or Your Family Down 0-->not at all   Trouble Concentrating on Things, Such as Reading the Newspaper or Watching Television 0-->not at all   Moving or Speaking So Slowly that Other People Could Have Noticed? Or the Opposite - Being So Fidgety 1-->several days   Thoughts that You Would be Better Off Dead or of Hurting Yourself in Some Way 0-->not at all   PHQ-9: Brief Depression Severity Measure Score 5   If You Checked Off Any Problems, How Difficult Have These Problems Made It For You to Do Your Work, Take Care of Things at Home, or Get Along with Other People? somewhat difficult       Health Habits and Functional and Cognitive Screening:  No flowsheet data found.    Age-appropriate Screening Schedule:  Refer to the list below for future screening recommendations based on patient's age, sex and/or medical conditions. Orders for these recommended tests are listed in the plan section. The patient has been provided with a written plan.    Health Maintenance   Topic Date Due   • DIABETIC EYE EXAM  2021   • URINE MICROALBUMIN  2022   • DIABETIC FOOT EXAM  2022   • INFLUENZA VACCINE  10/01/2022   •  HEMOGLOBIN A1C  01/21/2023   • LIPID PANEL  07/19/2023   • TDAP/TD VACCINES (2 - Td or Tdap) 05/31/2026              Assessment & Plan   CMS Preventative Services Quick Reference  Risk Factors Identified During Encounter  Cardiovascular Disease  The above risks/problems have been discussed with the patient.  Follow up actions/plans if indicated are seen below in the Assessment/Plan Section.  Pertinent information has been shared with the patient in the After Visit Summary.    Diagnoses and all orders for this visit:    1. Medicare annual wellness visit, subsequent (Primary)        Follow Up:   Return in about 3 months (around 10/26/2022) for Recheck.     An After Visit Summary and PPPS were made available to the patient.

## 2022-07-26 NOTE — PROGRESS NOTES
CC: Medicare Wellness-subsequent      Subjective:  Yordy Hansen is a 41 y.o. male who presents for     Patient presents to office for his annual wellness visit.  He has peripheral neuropathy which he takes gabapentin for. He is due for this to be refilled today. He is stable on current dosage of Gabapentin and doesn't need to be increased.  He has diabetes mellitus, he is followed by endocrinology for this.  He is morbidly obese.  He has hypertension and coronary artery and hyperlipidemia in which he takes atorvastatin, Norvasc, Cardizem, fenofibrate, lisinopril, metoprolol, nitroglycerin as needed, Effient, and Ranexa for.  He is also under the care of cardiology for heart disease.  He has GERD in which he takes Protonix for.  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  He has asthma in which he takes albuterol as needed for.  He has B12 deficiency in which he takes B12 supplement for.  He also has anxiety which is currently controlled and in remission with BuSpar and Prozac. He was recently in the hospital for chest pain. He had a left heart cath with no new findings. States he had a period of confusion while in the hospital. Pt states he was given a new medication, he can't recall what the new medication was. He had SARAH that resolved with fluids. This needs to be rechecked today.      The following portions of the patient's history were reviewed and updated as appropriate: allergies, current medications, past family history, past medical history, past social history, past surgical history and problem list.    Past Medical History:   Diagnosis Date   • Asthma    • CAD (coronary artery disease)    • Chronic back pain    • Chronic pulmonary embolism (HCC)    • Coronary artery disease    • Diabetes mellitus (HCC)    • Factor II deficiency (HCC)    • Fatty liver    • GERD (gastroesophageal reflux disease)    • Hyperlipidemia    • Hypertension    • Morbid obesity  "(HCC)    • RLS (restless legs syndrome)    • Seizures (HCC)    • Sleep apnea          Current Outpatient Medications:   •  amLODIPine (NORVASC) 5 MG tablet, Take 1 tablet by mouth Daily., Disp: 30 tablet, Rfl: 0  •  atorvastatin (LIPITOR) 80 MG tablet, Take 1 tablet by mouth Every Night., Disp: 90 tablet, Rfl: 3  •  busPIRone (BUSPAR) 10 MG tablet, Take 1 tablet by mouth 3 (Three) Times a Day As Needed (Anxiety)., Disp: 90 tablet, Rfl: 5  •  Dulaglutide (Trulicity) 3 MG/0.5ML solution pen-injector, Inject 0.5 mL under the skin into the appropriate area as directed 1 (One) Time Per Week., Disp: 5 pen, Rfl: 3  •  FLUoxetine (PROzac) 20 MG capsule, Take 1 capsule by mouth Daily., Disp: 30 capsule, Rfl: 5  •  gabapentin (NEURONTIN) 300 MG capsule, Take 2 capsules by mouth 3 (Three) Times a Day., Disp: 180 capsule, Rfl: 0  •  glucose blood test strip, Test 4 times daily ,E10.65, Disp: 120 each, Rfl: 11  •  glucose monitor monitoring kit, 1 each As Needed (check blood glucose 3x daily)., Disp: 1 each, Rfl: 3  •  HumaLOG KwikPen 100 UNIT/ML solution pen-injector, , Disp: , Rfl:   •  Insulin Disposable Pump (Omnipod DASH Pods, Gen 4,) misc, 1 package Daily., Disp: 30 each, Rfl: 11  •  insulin glargine (LANTUS, SEMGLEE) 100 UNIT/ML injection, Inject 80 Units under the skin into the appropriate area as directed Every Night. Please give 3 months supply, Disp: 70 mL, Rfl: 3  •  insulin lispro (HumaLOG) 100 UNIT/ML injection, 150 units daily through pump, Disp: 60 mL, Rfl: 5  •  Insulin Syringe-Needle U-100 25G X 1\" 1 ML misc, 1 syringe 4 (Four) Times a Day With Meals & at Bedtime., Disp: 200 each, Rfl: 5  •  Lancets misc, Test 4 times daily,E10.65, Disp: 120 each, Rfl: 11  •  Microlet Lancets misc, One touch delica lancets, Disp: 150 each, Rfl: 5  •  nitroglycerin (Nitro-Dur) 0.4 MG/HR patch, Place 1 patch on the skin as directed by provider Daily., Disp: 30 each, Rfl: 0  •  pantoprazole (PROTONIX) 40 MG EC tablet, Take 1 " tablet by mouth Every Night., Disp: 90 tablet, Rfl: 3  •  pramipexole (MIRAPEX) 1 MG tablet, Take 2 tablets by mouth Every Night., Disp: 180 tablet, Rfl: 3  •  rivaroxaban (XARELTO) 20 MG tablet, Take 1 tablet by mouth Daily With Dinner., Disp: 90 tablet, Rfl: 3  •  rosuvastatin (CRESTOR) 40 MG tablet, Take 40 mg by mouth., Disp: , Rfl:   •  SITagliptin (Januvia) 100 MG tablet, Take 1 tablet by mouth Daily., Disp: 90 tablet, Rfl: 3  •  tamsulosin (FLOMAX) 0.4 MG capsule 24 hr capsule, Take 0.4 mg by mouth Daily., Disp: , Rfl:   •  traZODone (DESYREL) 300 MG tablet, Take 1 tablet by mouth every night at bedtime., Disp: 90 tablet, Rfl: 3  •  albuterol sulfate  (90 Base) MCG/ACT inhaler, Inhale 2 puffs Every 6 (Six) Hours As Needed for Wheezing or Shortness of Air., Disp: 18 g, Rfl: 0  •  dilTIAZem CD (Cardizem CD) 120 MG 24 hr capsule, Take 1 capsule by mouth Daily., Disp: 90 capsule, Rfl: 0  •  Evolocumab (Repatha SureClick) solution auto-injector SureClick injection, Inject 1 mL under the skin into the appropriate area as directed Every 14 (Fourteen) Days., Disp: 2 pen, Rfl: 5  •  fenofibrate (TRICOR) 145 MG tablet, Take 145 mg by mouth Daily., Disp: , Rfl:   •  ipratropium-albuterol (DUO-NEB) 0.5-2.5 mg/3 ml nebulizer, Take 3 mL by nebulization 4 (Four) Times a Day., Disp: 360 mL, Rfl: 1  •  lisinopril (PRINIVIL,ZESTRIL) 10 MG tablet, Take 10 mg by mouth Daily., Disp: , Rfl:   •  metoprolol succinate XL (TOPROL-XL) 50 MG 24 hr tablet, Take 50 mg by mouth., Disp: , Rfl:   •  nitroglycerin (NITROSTAT) 0.4 MG SL tablet, Place 1 tablet under the tongue Every 5 (Five) Minutes As Needed for Chest Pain for up to 15 days., Disp: 25 tablet, Rfl: 6  •  prasugrel (EFFIENT) 10 MG tablet, Take 1 tablet by mouth Daily., Disp: 90 tablet, Rfl: 3  •  ranolazine (RANEXA) 1000 MG 12 hr tablet, Take 1 tablet by mouth Every 12 (Twelve) Hours., Disp: 180 tablet, Rfl: 3    Review of Systems    Review of Systems   Constitutional:  "Negative.    HENT: Negative.    Eyes: Negative.    Respiratory: Negative.    Cardiovascular: Positive for chest pain.   Gastrointestinal: Negative.    Endocrine: Negative.    Genitourinary: Negative.    Musculoskeletal: Positive for arthralgias and back pain.   Skin: Negative.    Allergic/Immunologic: Negative.    Neurological: Positive for numbness.   Hematological: Negative.    Psychiatric/Behavioral: Negative.    All other systems reviewed and are negative.      Objective  Vitals:    07/26/22 1008   BP: 140/86   Pulse: 69   SpO2: 96%   Weight: (!) 202 kg (445 lb)   Height: 180.3 cm (71\")     Body mass index is 62.06 kg/m².    Physical Exam    Physical Exam  Vitals and nursing note reviewed.   Constitutional:       General: He is not in acute distress.     Appearance: Normal appearance. He is obese. He is not ill-appearing, toxic-appearing or diaphoretic.   HENT:      Head: Normocephalic and atraumatic.   Eyes:      General: No scleral icterus.        Right eye: No discharge.         Left eye: No discharge.      Extraocular Movements: Extraocular movements intact.      Conjunctiva/sclera: Conjunctivae normal.   Neck:      Vascular: No carotid bruit.   Cardiovascular:      Rate and Rhythm: Normal rate and regular rhythm.      Pulses: Normal pulses.      Heart sounds: Normal heart sounds. No murmur heard.    No friction rub. No gallop.   Pulmonary:      Effort: Pulmonary effort is normal. No respiratory distress.      Breath sounds: Normal breath sounds. No stridor. No wheezing, rhonchi or rales.   Chest:      Chest wall: No tenderness.   Abdominal:      General: Bowel sounds are normal. There is no distension.      Palpations: Abdomen is soft. There is no mass.      Tenderness: There is no abdominal tenderness. There is no guarding or rebound.      Hernia: No hernia is present.   Musculoskeletal:      Cervical back: Normal range of motion and neck supple. No rigidity or tenderness.      Right lower leg: No edema. "      Left lower leg: No edema.   Lymphadenopathy:      Cervical: No cervical adenopathy.   Skin:     General: Skin is warm and dry.      Capillary Refill: Capillary refill takes less than 2 seconds.      Findings: No rash.   Neurological:      General: No focal deficit present.      Mental Status: He is alert and oriented to person, place, and time. Mental status is at baseline.   Psychiatric:         Attention and Perception: Attention and perception normal.         Mood and Affect: Mood and affect normal.         Speech: Speech normal.         Behavior: Behavior normal. Behavior is cooperative.         Thought Content: Thought content normal.         Judgment: Judgment normal.      Comments: Pt with good eye contact, answers questions appropriately             Diagnoses and all orders for this visit:    1. Medicare annual wellness visit, subsequent (Primary)  -     CBC w AUTO Differential; Future  -     Comprehensive metabolic panel; Future  -     TSH  -     Lipid Panel With LDL/HDL Ratio; Future  -     Hemoglobin A1c    2. 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 Atherosclerotic heart disease of native coronary artery with other forms of angina pectoris ([facility])    4. Diabetic peripheral neuropathy associated with type 2 diabetes mellitus ([facility])  -     gabapentin (NEURONTIN) 300 MG capsule; Take 2 capsules by mouth 3 (Three) Times a Day.  Dispense: 180 capsule; Refill: 0    5. Type 2 diabetes mellitus with hyperglycemia, with long-term current use of insulin ([facility])  -     MicroAlbumin, Urine, Random - Urine, Clean Catch; Future  -     Hemoglobin A1c    6. Class 3 severe obesity due to excess calories with serious comorbidity and body mass index (BMI) of 60.0 to 69.9 in adult ([facility])    7. Essential hypertension    8. Hyperlipidemia, unspecified hyperlipidemia type    9. Gastroesophageal reflux disease without esophagitis    10. Chronic pulmonary embolism, unspecified pulmonary embolism type, unspecified whether acute cor  pulmonale present (HCC)    11. Mild intermittent asthma without complication    12. B12 deficiency  -     Vitamin B12; Future    13. Anxiety    14. Type 2 diabetes mellitus with diabetic neuropathy, with long-term current use of insulin (HCC)  -     gabapentin (NEURONTIN) 300 MG capsule; Take 2 capsules by mouth 3 (Three) Times a Day.  Dispense: 180 capsule; Refill: 0       Annual wellness visit performed today.  Please see note for details.  Patient to continue current medications.  His Neurontin was filled today Patient understands the risks associated with this controlled medication, including tolerance and addiction.  he also agrees to only obtain this medication from me, and not from a another provider, unless that provider is covering for me in my absence.  he also agrees to be compliant in dosing, and not self adjust the dose of medication.  A signed controlled substance agreement is on file, and he has received a controlled substance education sheet at this a previous visit.  he has also signed a consent for treatment with a controlled substance as per [facility] policy. CARY was obtained.            This document has been electronically signed by LALA Munoz on July 26, 2022 10:39 CDT

## 2022-07-27 LAB
ALBUMIN UR-MCNC: 32 MG/DL
CHOLEST SERPL-MCNC: 175 MG/DL (ref 0–200)
HBA1C MFR BLD: 8.2 % (ref 4.8–5.6)
HDLC SERPL-MCNC: 31 MG/DL (ref 40–60)
LDLC SERPL CALC-MCNC: 104 MG/DL (ref 0–100)
LDLC/HDLC SERPL: 3.16 {RATIO}
TRIGL SERPL-MCNC: 230 MG/DL (ref 0–150)
TSH SERPL DL<=0.05 MIU/L-ACNC: 3.57 UIU/ML (ref 0.27–4.2)
VIT B12 BLD-MCNC: 347 PG/ML (ref 211–946)
VLDLC SERPL-MCNC: 40 MG/DL (ref 5–40)

## 2022-08-16 ENCOUNTER — OFFICE VISIT (OUTPATIENT)
Dept: ENDOCRINOLOGY | Facility: CLINIC | Age: 42
End: 2022-08-16

## 2022-08-16 VITALS
WEIGHT: 315 LBS | SYSTOLIC BLOOD PRESSURE: 160 MMHG | BODY MASS INDEX: 44.1 KG/M2 | OXYGEN SATURATION: 98 % | HEART RATE: 114 BPM | HEIGHT: 71 IN | DIASTOLIC BLOOD PRESSURE: 92 MMHG

## 2022-08-16 DIAGNOSIS — Z79.4 TYPE 2 DIABETES MELLITUS WITH HYPERGLYCEMIA, WITH LONG-TERM CURRENT USE OF INSULIN: Primary | ICD-10-CM

## 2022-08-16 DIAGNOSIS — E11.65 TYPE 2 DIABETES MELLITUS WITH HYPERGLYCEMIA, WITH LONG-TERM CURRENT USE OF INSULIN: Primary | ICD-10-CM

## 2022-08-16 DIAGNOSIS — I10 PRIMARY HYPERTENSION: ICD-10-CM

## 2022-08-16 DIAGNOSIS — E78.5 HYPERLIPIDEMIA, UNSPECIFIED HYPERLIPIDEMIA TYPE: ICD-10-CM

## 2022-08-16 DIAGNOSIS — E11.42 DIABETIC PERIPHERAL NEUROPATHY ASSOCIATED WITH TYPE 2 DIABETES MELLITUS: ICD-10-CM

## 2022-08-16 PROCEDURE — 99214 OFFICE O/P EST MOD 30 MIN: CPT | Performed by: NURSE PRACTITIONER

## 2022-08-16 RX ORDER — INSULIN PUMP CONTROLLER
1 EACH MISCELLANEOUS DAILY
Qty: 6 EACH | Refills: 11 | Status: SHIPPED | OUTPATIENT
Start: 2022-08-16

## 2022-08-16 NOTE — PROGRESS NOTES
"Chief Complaint  Diabetes    Subjective          Yordy Hansen presents to [facility]  History of Present Illness       In office visit         Chief Complaint   Patient presents with   • Diabetes       HPI    Referring provider LALA Fink     41 year old male presents for follow up         Reason -- diabetes mellitus type 2     Duration  Diagnosed in 2017     Context routine lab work     Timing constant     Quality  Controlled     Severity moderate     Macrovascular complications---CAD, MI , stents X3         Microvascular complications ---  + neuropathy, no DR, no renal disease         Current diabetes regimen     Insulin, glp-1      Now on omni pod     Current glucose monitoring     fingersticks     Checks 4 times daily       States doing great since on pump     Now staying  under 160       He has memory issues she states and he does forget to give his insulin    Review of systems negative              Objective   Vital Signs:   /92   Pulse 114   Ht 180.3 cm (71\")   Wt (!) 206 kg (453 lb 11.2 oz)   SpO2 98%   BMI 63.28 kg/m²     Physical Exam  Constitutional:       Appearance: Normal appearance.   Cardiovascular:      Rate and Rhythm: Regular rhythm.      Heart sounds: Normal heart sounds.   Pulmonary:      Breath sounds: Normal breath sounds.   Musculoskeletal:      Cervical back: Normal range of motion.   Neurological:      Mental Status: He is alert.        Result Review :   The following data was reviewed by: LALA Tirado on 03/23/2022:  Common labs    Common Labsle 7/22/22 7/22/22 7/23/22 7/26/22 7/26/22 7/26/22 7/26/22 7/26/22    0418 1623  1042 1042 1042 1042 1042   Glucose    106 (A)       Glucose 166 (A) 175 (A) 142 (A)        BUN 41 (A) 41 (A) 23 10       Creatinine 2.9 (A) 2.0 (A) 1.0 0.82       Sodium 134 (A) 134 (A) 137 141       Potassium 4.8 4.7 4.5 4.1       Chloride 100 101 104 102       Calcium 7.9 (A) 8.2 (A) 8.5 " (A) 9.1       Albumin 3.2 (A)  3.3 (A) 3.70       Total Bilirubin 0.61  0.40 0.4       Alkaline Phosphatase 54  57 64       AST (SGOT) 12 (A)  12 (A) 40       ALT (SGPT) 14  12 30       WBC     7.36      Hemoglobin     13.3      Hematocrit     39.2      Platelets     185      Total Cholesterol        175   Triglycerides        230 (A)   HDL Cholesterol        31 (A)   LDL Cholesterol         104 (A)   Hemoglobin A1C      8.20 (A)     Microalbumin, Urine       32.0    (A) Abnormal value                        Assessment and Plan    Diagnoses and all orders for this visit:    1. Type 2 diabetes mellitus with hyperglycemia, with long-term current use of insulin (HCC) (Primary)    2. Diabetic peripheral neuropathy associated with type 2 diabetes mellitus (HCC)    3. Primary hypertension    4. Hyperlipidemia, unspecified hyperlipidemia type    Other orders  -     Insulin Disposable Pump (Omnipod DASH Pods, Gen 4,) misc; 1 package Daily.  Dispense: 6 each; Refill: 11                       Glycemic Management:    Diabetes mellitus type 2     Lab Results   Component Value Date    HGBA1C 8.20 (H) 07/26/2022           On omni pod did not bring pump to appointment     He is using and doing great               Taking Trulicity 3 mg once weekly       Previous regimen :          Lantus 80 units once evening      Humalog 35 units each meal tid --- we discussed adjusting his insulin based on the amount of carbohydrates that he is eating      We will add a sliding scale  +       Add sliding scale     151-200          3 units   201-250         6 units   251-300         9 units   Above 301   12  units         Of asked him to keep a log book so that he can check off before each meal with his insulin and to check off at nighttime to give the Lantus    We will try to get him approved for insulin pump    In a sensor    Of asked him to return in 2 weeks with his blood sugar logs so we can see if his regimen is working        Metformin side  effects       Approve dexcom G6       The patient has diabetes mellitus, insulin-dependent.     Our Diabetes Department has evaluated the patient in the last six months and will continue counseling on insulin adjustment.      The patient performs blood glucose testing at least four times daily with proven glucose variability from 50 to 300 mg per dl.     The patient is administering basal insulin and prandial insulin four times per day for more than six months.     The patient uses a home blood glucose monitor to assess blood glucose at least four times daily for more than six months.     The patient requires frequent adjustment of insulin treatment regimen based on blood glucose readings.     The patient has frequent variability in blood glucose readings due to activity and variability in meal content and time.      The patient has completed a diabetes education program with us.     The patient has demonstrated the ability to self-monitor her glucose.      The patient is motivated in improving diabetes control      The patient has hypoglycemia unawareness               Microvascular Complications Monitoring       Last eye exam, no DR     Neuropathy  Yes       Lipid Management:     Total Cholesterol   Date Value Ref Range Status   07/26/2022 175 0 - 200 mg/dL Final   07/19/2022 214 (H) <200 mg/dL Final     Triglycerides   Date Value Ref Range Status   07/26/2022 230 (H) 0 - 150 mg/dL Final   07/19/2022 313 (H) 30 - 150 mg/dL Final   07/10/2019 202 (H) 20 - 160 mg/dL Final     HDL Cholesterol   Date Value Ref Range Status   07/26/2022 31 (L) 40 - 60 mg/dL Final   07/19/2022 37 32 - 72 mg/dL Final     LDL Cholesterol    Date Value Ref Range Status   07/26/2022 104 (H) 0 - 100 mg/dL Final   07/19/2022 120 (H) 5 - 99 mg/dL Final   07/10/2019 80 mg/dL Final     Comment:         OPTIMAL: <100 mg/dl  LOW RISK: 100-129 mg/dl  BORDERLINE HIGH: 130-159 mg/dl  HIGH: 160-189 mg/dl  VERY HIGH: >189 mg/dl         Taking repatha      Taking fenofibrate       Blood Pressure Management:      Taking Cardizem 120 mg daily    Taking Norvasc 5 mg daily      Taking metoprolol 100 mg daily    Taking lisinopril 40 mg daily            Thyroid Health      Lab Results   Component Value Date    TSH 3.570 07/26/2022           Bone Health       Lab Results   Component Value Date    CALCIUM 9.1 07/26/2022    PHOS 2.3 (L) 07/23/2022           Weight Management:      Obesity     Patient's Body mass index is 63.28 kg/m². indicating that he is morbidly obese (BMI > 40 or > 35 with obesity - related health condition). Obesity-related health conditions include the following: diabetes mellitus. Obesity is unchanged. BMI is is above average; no BMI management plan is appropriate. We discussed portion control and increasing exercise..      Preventive Care:     Non smoker                         Follow Up   Return in about 3 months (around 11/16/2022).  Patient was given instructions and counseling regarding his condition or for health maintenance advice. Please see specific information pulled into the AVS if appropriate.         This document has been electronically signed by LALA Tirado on August 16, 2022 14:26 CDT.

## 2022-08-18 ENCOUNTER — TELEPHONE (OUTPATIENT)
Dept: ENDOCRINOLOGY | Facility: CLINIC | Age: 42
End: 2022-08-18

## 2022-08-18 ENCOUNTER — DOCUMENTATION (OUTPATIENT)
Dept: ENDOCRINOLOGY | Facility: CLINIC | Age: 42
End: 2022-08-18

## 2022-08-18 NOTE — TELEPHONE ENCOUNTER
[facility] pharmacy called requesting clarification on his omni pod prescription. She said it looks like it says to change it daily and insurance will not pay for that. Can you please reach out to them.    Thanks

## 2022-09-15 DIAGNOSIS — Z79.4 TYPE 2 DIABETES MELLITUS WITH DIABETIC NEUROPATHY, WITH LONG-TERM CURRENT USE OF INSULIN: Chronic | ICD-10-CM

## 2022-09-15 DIAGNOSIS — E11.40 TYPE 2 DIABETES MELLITUS WITH DIABETIC NEUROPATHY, WITH LONG-TERM CURRENT USE OF INSULIN: Chronic | ICD-10-CM

## 2022-09-15 DIAGNOSIS — E11.42 DIABETIC PERIPHERAL NEUROPATHY ASSOCIATED WITH TYPE 2 DIABETES MELLITUS: Chronic | ICD-10-CM

## 2022-09-15 RX ORDER — GABAPENTIN 300 MG/1
600 CAPSULE ORAL 3 TIMES DAILY
Qty: 180 CAPSULE | Refills: 0 | Status: SHIPPED | OUTPATIENT
Start: 2022-09-15 | End: 2022-10-17 | Stop reason: SDUPTHER

## 2022-09-23 NOTE — PLAN OF CARE
Problem: Patient Care Overview (Adult)  Goal: Plan of Care Review  Outcome: Ongoing (interventions implemented as appropriate)    10/09/17 1403   Coping/Psychosocial Response Interventions   Plan Of Care Reviewed With patient   Patient Care Overview   Progress declining   Outcome Evaluation   Outcome Summary/Follow up Plan still very SOA with ambulation       Goal: Adult Individualization and Mutuality  Outcome: Ongoing (interventions implemented as appropriate)  Goal: Discharge Needs Assessment  Outcome: Ongoing (interventions implemented as appropriate)    Problem: Acute Coronary Syndrome (ACS) (Adult)  Goal: Signs and Symptoms of Listed Potential Problems Will be Absent or Manageable (Acute Coronary Syndrome)  Outcome: Ongoing (interventions implemented as appropriate)    Problem: Pain, Chronic (Adult)  Goal: Acceptable Pain Control/Comfort Level  Outcome: Ongoing (interventions implemented as appropriate)    Problem: Diabetes, Type 2 (Adult)  Goal: Signs and Symptoms of Listed Potential Problems Will be Absent or Manageable (Diabetes, Type 2)  Outcome: Ongoing (interventions implemented as appropriate)       Fall

## 2022-10-03 DIAGNOSIS — Z79.4 TYPE 2 DIABETES MELLITUS WITH HYPERGLYCEMIA, WITH LONG-TERM CURRENT USE OF INSULIN: Chronic | ICD-10-CM

## 2022-10-03 DIAGNOSIS — E11.65 TYPE 2 DIABETES MELLITUS WITH HYPERGLYCEMIA, WITH LONG-TERM CURRENT USE OF INSULIN: Chronic | ICD-10-CM

## 2022-10-03 DIAGNOSIS — E78.5 HYPERLIPIDEMIA, UNSPECIFIED HYPERLIPIDEMIA TYPE: Chronic | ICD-10-CM

## 2022-10-03 DIAGNOSIS — I10 ESSENTIAL HYPERTENSION: Chronic | ICD-10-CM

## 2022-10-03 RX ORDER — SITAGLIPTIN 100 MG/1
TABLET, FILM COATED ORAL
Qty: 30 TABLET | Refills: 0 | Status: SHIPPED | OUTPATIENT
Start: 2022-10-03 | End: 2022-11-08

## 2022-10-03 RX ORDER — FENOFIBRATE 134 MG/1
134 CAPSULE ORAL
Qty: 90 CAPSULE | Refills: 3 | OUTPATIENT
Start: 2022-10-03

## 2022-10-03 RX ORDER — METOPROLOL SUCCINATE 50 MG/1
TABLET, EXTENDED RELEASE ORAL
Qty: 30 TABLET | Refills: 0 | Status: SHIPPED | OUTPATIENT
Start: 2022-10-03 | End: 2022-11-08

## 2022-10-03 RX ORDER — LISINOPRIL 40 MG/1
40 TABLET ORAL NIGHTLY
Qty: 90 TABLET | Refills: 3 | OUTPATIENT
Start: 2022-10-03

## 2022-10-03 RX ORDER — ATORVASTATIN CALCIUM 80 MG/1
80 TABLET, FILM COATED ORAL NIGHTLY
Qty: 30 TABLET | Refills: 0 | Status: SHIPPED | OUTPATIENT
Start: 2022-10-03 | End: 2022-11-08

## 2022-10-05 ENCOUNTER — OFFICE VISIT (OUTPATIENT)
Dept: FAMILY MEDICINE CLINIC | Facility: CLINIC | Age: 42
End: 2022-10-05

## 2022-10-05 VITALS
HEIGHT: 71 IN | SYSTOLIC BLOOD PRESSURE: 158 MMHG | BODY MASS INDEX: 44.1 KG/M2 | HEART RATE: 78 BPM | DIASTOLIC BLOOD PRESSURE: 94 MMHG | WEIGHT: 315 LBS | OXYGEN SATURATION: 96 %

## 2022-10-05 DIAGNOSIS — D17.79 LIPOMA OF OTHER SPECIFIED SITES: Primary | ICD-10-CM

## 2022-10-05 DIAGNOSIS — G47.00 INSOMNIA, UNSPECIFIED TYPE: Chronic | ICD-10-CM

## 2022-10-05 DIAGNOSIS — G25.81 RLS (RESTLESS LEGS SYNDROME): Chronic | ICD-10-CM

## 2022-10-05 PROCEDURE — 99213 OFFICE O/P EST LOW 20 MIN: CPT | Performed by: NURSE PRACTITIONER

## 2022-10-05 RX ORDER — PRAMIPEXOLE DIHYDROCHLORIDE 1 MG/1
2 TABLET ORAL NIGHTLY
Qty: 180 TABLET | Refills: 3 | Status: SHIPPED | OUTPATIENT
Start: 2022-10-05

## 2022-10-05 RX ORDER — TRAZODONE HYDROCHLORIDE 300 MG/1
300 TABLET ORAL
Qty: 90 TABLET | Refills: 3 | Status: SHIPPED | OUTPATIENT
Start: 2022-10-05

## 2022-10-05 NOTE — PROGRESS NOTES
CC: Wanting Referral (Lipoma coming back on neck, had one removed 2019)      Subjective:  Yordy Hansen is a 42 y.o. male who presents for         Patient presents to office requesting referral for a lipoma that is on his neck.  States he had 1 removed in 2019 and feels like this is coming back. It is located on the posterior neck. States this causes associated pain that radiates up his head on the right. Denies known erythema, edema, or drainage from the site. Pt requesting referral to Dr. Orellana in [facility].       The following portions of the patient's history were reviewed and updated as appropriate: allergies, current medications, past family history, past medical history, past social history, past surgical history and problem list.    Past Medical History:   Diagnosis Date   • Asthma    • CAD (coronary artery disease)    • Chronic back pain    • Chronic pulmonary embolism (HCC)    • Coronary artery disease    • Diabetes mellitus (HCC)    • Factor II deficiency (HCC)    • Fatty liver    • GERD (gastroesophageal reflux disease)    • Hyperlipidemia    • Hypertension    • Morbid obesity (HCC)    • RLS (restless legs syndrome)    • Seizures ([facility])    • Sleep apnea          Current Outpatient Medications:   •  albuterol sulfate  (90 Base) MCG/ACT inhaler, Inhale 2 puffs Every 6 (Six) Hours As Needed for Wheezing or Shortness of Air., Disp: 18 g, Rfl: 0  •  amLODIPine (NORVASC) 5 MG tablet, Take 1 tablet by mouth Daily., Disp: 30 tablet, Rfl: 0  •  atorvastatin (LIPITOR) 80 MG tablet, TAKE 1 TABLET BY MOUTH EVERY NIGHT., Disp: 30 tablet, Rfl: 0  •  busPIRone (BUSPAR) 10 MG tablet, Take 1 tablet by mouth 3 (Three) Times a Day As Needed (Anxiety)., Disp: 90 tablet, Rfl: 5  •  dilTIAZem CD (Cardizem CD) 120 MG 24 hr capsule, Take 1 capsule by mouth Daily., Disp: 90 capsule, Rfl: 0  •  Dulaglutide (Trulicity) 3 MG/0.5ML solution pen-injector, Inject 0.5 mL under the skin into the appropriate area as  "directed 1 (One) Time Per Week., Disp: 5 pen, Rfl: 3  •  Evolocumab (Repatha SureClick) solution auto-injector SureClick injection, Inject 1 mL under the skin into the appropriate area as directed Every 14 (Fourteen) Days., Disp: 2 pen, Rfl: 5  •  fenofibrate (TRICOR) 145 MG tablet, Take 145 mg by mouth Daily., Disp: , Rfl:   •  FLUoxetine (PROzac) 20 MG capsule, Take 1 capsule by mouth Daily., Disp: 30 capsule, Rfl: 5  •  gabapentin (NEURONTIN) 300 MG capsule, Take 2 capsules by mouth 3 (Three) Times a Day., Disp: 180 capsule, Rfl: 0  •  glucose blood test strip, Test 4 times daily ,E10.65, Disp: 120 each, Rfl: 11  •  glucose monitor monitoring kit, 1 each As Needed (check blood glucose 3x daily)., Disp: 1 each, Rfl: 3  •  HumaLOG KwikPen 100 UNIT/ML solution pen-injector, , Disp: , Rfl:   •  Insulin Disposable Pump (Omnipod DASH Pods, Gen 4,) misc, 1 package Daily., Disp: 6 each, Rfl: 11  •  insulin glargine (LANTUS, SEMGLEE) 100 UNIT/ML injection, Inject 80 Units under the skin into the appropriate area as directed Every Night. Please give 3 months supply, Disp: 70 mL, Rfl: 3  •  insulin lispro (HumaLOG) 100 UNIT/ML injection, 150 units daily through pump, Disp: 60 mL, Rfl: 5  •  Insulin Syringe-Needle U-100 25G X 1\" 1 ML misc, 1 syringe 4 (Four) Times a Day With Meals & at Bedtime., Disp: 200 each, Rfl: 5  •  ipratropium-albuterol (DUO-NEB) 0.5-2.5 mg/3 ml nebulizer, Take 3 mL by nebulization 4 (Four) Times a Day., Disp: 360 mL, Rfl: 1  •  Januvia 100 MG tablet, TAKE 1 TABLET BY MOUTH DAILY., Disp: 30 tablet, Rfl: 0  •  Lancets misc, Test 4 times daily,E10.65, Disp: 120 each, Rfl: 11  •  lisinopril (PRINIVIL,ZESTRIL) 10 MG tablet, Take 10 mg by mouth Daily., Disp: , Rfl:   •  metoprolol succinate XL (TOPROL-XL) 50 MG 24 hr tablet, TAKE 1 TABLET BY MOUTH DAILY., Disp: 30 tablet, Rfl: 0  •  Microlet Lancets misc, One touch delica lancets, Disp: 150 each, Rfl: 5  •  nitroglycerin (Nitro-Dur) 0.4 MG/HR patch, Place " "1 patch on the skin as directed by provider Daily., Disp: 30 each, Rfl: 0  •  pantoprazole (PROTONIX) 40 MG EC tablet, Take 1 tablet by mouth Every Night., Disp: 90 tablet, Rfl: 3  •  pramipexole (MIRAPEX) 1 MG tablet, Take 2 tablets by mouth Every Night., Disp: 180 tablet, Rfl: 3  •  prasugrel (EFFIENT) 10 MG tablet, Take 1 tablet by mouth Daily., Disp: 90 tablet, Rfl: 3  •  ranolazine (RANEXA) 1000 MG 12 hr tablet, Take 1 tablet by mouth Every 12 (Twelve) Hours., Disp: 180 tablet, Rfl: 3  •  rivaroxaban (XARELTO) 20 MG tablet, Take 1 tablet by mouth Daily With Dinner., Disp: 90 tablet, Rfl: 3  •  rosuvastatin (CRESTOR) 40 MG tablet, Take 40 mg by mouth., Disp: , Rfl:   •  tamsulosin (FLOMAX) 0.4 MG capsule 24 hr capsule, Take 0.4 mg by mouth Daily., Disp: , Rfl:   •  traZODone (DESYREL) 300 MG tablet, Take 1 tablet by mouth every night at bedtime., Disp: 90 tablet, Rfl: 3  •  Diclofenac Sodium (VOLTAREN) 1 % gel gel, Apply 4 g topically to the appropriate area as directed 4 (Four) Times a Day As Needed (pain)., Disp: 100 g, Rfl: 3  •  nitroglycerin (NITROSTAT) 0.4 MG SL tablet, Place 1 tablet under the tongue Every 5 (Five) Minutes As Needed for Chest Pain for up to 15 days., Disp: 25 tablet, Rfl: 6    Review of Systems    Review of Systems   Constitutional: Negative.    HENT: Positive for ear pain.    Eyes: Negative.    Respiratory: Negative.    Cardiovascular: Negative.    Gastrointestinal: Negative.    Endocrine: Negative.    Genitourinary: Negative.    Musculoskeletal: Positive for neck pain.   Skin: Negative.    Allergic/Immunologic: Negative.    Neurological: Positive for headaches.   Hematological: Negative.    Psychiatric/Behavioral: Negative.    All other systems reviewed and are negative.      Objective  Vitals:    10/05/22 1042   BP: 158/94   Pulse: 78   SpO2: 96%   Weight: (!) 206 kg (455 lb)   Height: 180.3 cm (71\")     Body mass index is 63.46 kg/m².    Physical Exam    Physical Exam  Vitals and " nursing note reviewed.   Constitutional:       General: He is not in acute distress.     Appearance: Normal appearance. He is not ill-appearing, toxic-appearing or diaphoretic.   HENT:      Head: Normocephalic and atraumatic.      Right Ear: Tympanic membrane, ear canal and external ear normal. There is no impacted cerumen.   Cardiovascular:      Rate and Rhythm: Normal rate and regular rhythm.      Pulses: Normal pulses.      Heart sounds: Normal heart sounds. No murmur heard.    No friction rub. No gallop.   Pulmonary:      Effort: Pulmonary effort is normal. No respiratory distress.      Breath sounds: Normal breath sounds. No stridor. No wheezing, rhonchi or rales.   Chest:      Chest wall: No tenderness.   Musculoskeletal:      Cervical back: Normal range of motion and neck supple. No rigidity or tenderness.      Comments: Upper back with mass noted. No erythema or edema noted. No drainage noted. It is slightly tender on palpation. It is mobile and consistent with a Lipoma   Lymphadenopathy:      Cervical: No cervical adenopathy.   Skin:     General: Skin is warm and dry.   Neurological:      Mental Status: He is alert.             Diagnoses and all orders for this visit:    1. Lipoma of other specified sites (Primary)  -     Ambulatory Referral to General Surgery  -     Diclofenac Sodium (VOLTAREN) 1 % gel gel; Apply 4 g topically to the appropriate area as directed 4 (Four) Times a Day As Needed (pain).  Dispense: 100 g; Refill: 3         Will refer patient back to Dr. Orellana who removed patient's first lipoma for further evaluation and possible removal.  Patient advised to continue Tylenol or ibuprofen as needed for the headaches caused by the lipoma.  We will also give patient some Voltaren gel to apply to the upper back to see if this will help with pain.  Patient advised to keep his follow-up appointment with me at the end of this month.  Answered all questions.  Patient verbalized understanding of plan  of care.        This document has been electronically signed by LALA Munoz on October 5, 2022 10:58 CDT

## 2022-10-17 DIAGNOSIS — Z79.4 TYPE 2 DIABETES MELLITUS WITH DIABETIC NEUROPATHY, WITH LONG-TERM CURRENT USE OF INSULIN: Chronic | ICD-10-CM

## 2022-10-17 DIAGNOSIS — E11.40 TYPE 2 DIABETES MELLITUS WITH DIABETIC NEUROPATHY, WITH LONG-TERM CURRENT USE OF INSULIN: Chronic | ICD-10-CM

## 2022-10-17 DIAGNOSIS — E11.42 DIABETIC PERIPHERAL NEUROPATHY ASSOCIATED WITH TYPE 2 DIABETES MELLITUS: Chronic | ICD-10-CM

## 2022-10-17 RX ORDER — GABAPENTIN 300 MG/1
600 CAPSULE ORAL 3 TIMES DAILY
Qty: 180 CAPSULE | Refills: 0 | Status: SHIPPED | OUTPATIENT
Start: 2022-10-17

## 2022-11-08 DIAGNOSIS — Z79.4 TYPE 2 DIABETES MELLITUS WITH HYPERGLYCEMIA, WITH LONG-TERM CURRENT USE OF INSULIN: Chronic | ICD-10-CM

## 2022-11-08 DIAGNOSIS — E11.65 TYPE 2 DIABETES MELLITUS WITH HYPERGLYCEMIA, WITH LONG-TERM CURRENT USE OF INSULIN: Chronic | ICD-10-CM

## 2022-11-08 DIAGNOSIS — E78.5 HYPERLIPIDEMIA, UNSPECIFIED HYPERLIPIDEMIA TYPE: Chronic | ICD-10-CM

## 2022-11-08 RX ORDER — SITAGLIPTIN 100 MG/1
TABLET, FILM COATED ORAL
Qty: 30 TABLET | Refills: 0 | Status: SHIPPED | OUTPATIENT
Start: 2022-11-08 | End: 2023-01-04

## 2022-11-08 RX ORDER — ATORVASTATIN CALCIUM 80 MG/1
80 TABLET, FILM COATED ORAL NIGHTLY
Qty: 30 TABLET | Refills: 0 | Status: SHIPPED | OUTPATIENT
Start: 2022-11-08 | End: 2023-01-04

## 2022-11-08 RX ORDER — METOPROLOL SUCCINATE 50 MG/1
TABLET, EXTENDED RELEASE ORAL
Qty: 30 TABLET | Refills: 0 | Status: SHIPPED | OUTPATIENT
Start: 2022-11-08 | End: 2023-01-04

## 2022-12-29 DIAGNOSIS — E78.5 HYPERLIPIDEMIA, UNSPECIFIED HYPERLIPIDEMIA TYPE: Chronic | ICD-10-CM

## 2022-12-29 DIAGNOSIS — Z79.4 TYPE 2 DIABETES MELLITUS WITH HYPERGLYCEMIA, WITH LONG-TERM CURRENT USE OF INSULIN: Chronic | ICD-10-CM

## 2022-12-29 DIAGNOSIS — E11.65 TYPE 2 DIABETES MELLITUS WITH HYPERGLYCEMIA, WITH LONG-TERM CURRENT USE OF INSULIN: Chronic | ICD-10-CM

## 2023-01-04 DIAGNOSIS — E11.40 TYPE 2 DIABETES MELLITUS WITH DIABETIC NEUROPATHY, WITH LONG-TERM CURRENT USE OF INSULIN: Chronic | ICD-10-CM

## 2023-01-04 DIAGNOSIS — Z79.4 TYPE 2 DIABETES MELLITUS WITH DIABETIC NEUROPATHY, WITH LONG-TERM CURRENT USE OF INSULIN: Chronic | ICD-10-CM

## 2023-01-04 DIAGNOSIS — E11.42 DIABETIC PERIPHERAL NEUROPATHY ASSOCIATED WITH TYPE 2 DIABETES MELLITUS: Chronic | ICD-10-CM

## 2023-01-04 RX ORDER — ATORVASTATIN CALCIUM 80 MG/1
80 TABLET, FILM COATED ORAL NIGHTLY
Qty: 30 TABLET | Refills: 0 | Status: SHIPPED | OUTPATIENT
Start: 2023-01-04

## 2023-01-04 RX ORDER — GABAPENTIN 300 MG/1
600 CAPSULE ORAL 3 TIMES DAILY
Qty: 180 CAPSULE | Refills: 0 | OUTPATIENT
Start: 2023-01-04

## 2023-01-04 RX ORDER — METOPROLOL SUCCINATE 50 MG/1
50 TABLET, EXTENDED RELEASE ORAL DAILY
Qty: 30 TABLET | Refills: 0 | Status: SHIPPED | OUTPATIENT
Start: 2023-01-04

## 2023-01-31 NOTE — PLAN OF CARE
Problem: Patient Care Overview (Adult)  Goal: Plan of Care Review  Outcome: Ongoing (interventions implemented as appropriate)    09/22/17 0412   Coping/Psychosocial Response Interventions   Plan Of Care Reviewed With patient   Patient Care Overview   Progress no change   Outcome Evaluation   Outcome Summary/Follow up Plan Pt appears to have rested well this shift. Complains of some chest pain but mostly pain in his ears from a previous ear infection. VSS. Will continue to monitor.        Goal: Adult Individualization and Mutuality  Outcome: Ongoing (interventions implemented as appropriate)  Goal: Discharge Needs Assessment  Outcome: Ongoing (interventions implemented as appropriate)    Problem: Acute Coronary Syndrome (ACS) (Adult)  Goal: Signs and Symptoms of Listed Potential Problems Will be Absent or Manageable (Acute Coronary Syndrome)  Outcome: Ongoing (interventions implemented as appropriate)       supervision

## 2023-04-12 ENCOUNTER — OFFICE VISIT (OUTPATIENT)
Dept: FAMILY MEDICINE CLINIC | Facility: CLINIC | Age: 43
End: 2023-04-12
Payer: MEDICAID

## 2023-04-12 VITALS
WEIGHT: 315 LBS | SYSTOLIC BLOOD PRESSURE: 160 MMHG | BODY MASS INDEX: 44.1 KG/M2 | DIASTOLIC BLOOD PRESSURE: 112 MMHG | HEIGHT: 71 IN | HEART RATE: 96 BPM | OXYGEN SATURATION: 94 %

## 2023-04-12 DIAGNOSIS — E11.65 TYPE 2 DIABETES MELLITUS WITH HYPERGLYCEMIA, WITH LONG-TERM CURRENT USE OF INSULIN: Chronic | ICD-10-CM

## 2023-04-12 DIAGNOSIS — R07.9 CHEST PAIN, UNSPECIFIED TYPE: ICD-10-CM

## 2023-04-12 DIAGNOSIS — I10 ESSENTIAL HYPERTENSION: Chronic | ICD-10-CM

## 2023-04-12 DIAGNOSIS — G25.81 RLS (RESTLESS LEGS SYNDROME): Chronic | ICD-10-CM

## 2023-04-12 DIAGNOSIS — Z79.4 TYPE 2 DIABETES MELLITUS WITH HYPERGLYCEMIA, WITH LONG-TERM CURRENT USE OF INSULIN: Chronic | ICD-10-CM

## 2023-04-12 DIAGNOSIS — E78.2 MIXED HYPERLIPIDEMIA: Chronic | ICD-10-CM

## 2023-04-12 DIAGNOSIS — F41.9 ANXIETY: Chronic | ICD-10-CM

## 2023-04-12 DIAGNOSIS — F43.21 GRIEF: Chronic | ICD-10-CM

## 2023-04-12 DIAGNOSIS — E11.40 TYPE 2 DIABETES MELLITUS WITH DIABETIC NEUROPATHY, WITH LONG-TERM CURRENT USE OF INSULIN: Chronic | ICD-10-CM

## 2023-04-12 DIAGNOSIS — K21.9 GASTROESOPHAGEAL REFLUX DISEASE WITHOUT ESOPHAGITIS: Chronic | ICD-10-CM

## 2023-04-12 DIAGNOSIS — Z79.4 TYPE 2 DIABETES MELLITUS WITH DIABETIC NEUROPATHY, WITH LONG-TERM CURRENT USE OF INSULIN: Chronic | ICD-10-CM

## 2023-04-12 DIAGNOSIS — Z09 HOSPITAL DISCHARGE FOLLOW-UP: Primary | ICD-10-CM

## 2023-04-12 DIAGNOSIS — Z91.199 NON-COMPLIANCE: Chronic | ICD-10-CM

## 2023-04-12 DIAGNOSIS — E11.42 DIABETIC PERIPHERAL NEUROPATHY ASSOCIATED WITH TYPE 2 DIABETES MELLITUS: Chronic | ICD-10-CM

## 2023-04-12 DIAGNOSIS — G47.00 INSOMNIA, UNSPECIFIED TYPE: Chronic | ICD-10-CM

## 2023-04-12 PROBLEM — A41.9 SEPSIS, UNSPECIFIED ORGANISM: Status: RESOLVED | Noted: 2018-12-13 | Resolved: 2023-04-12

## 2023-04-12 PROBLEM — E11.9 TYPE 2 DIABETES MELLITUS WITHOUT COMPLICATION, WITH LONG-TERM CURRENT USE OF INSULIN: Status: RESOLVED | Noted: 2018-03-30 | Resolved: 2023-04-12

## 2023-04-12 RX ORDER — TICAGRELOR 90 MG/1
1 TABLET ORAL EVERY 12 HOURS SCHEDULED
COMMUNITY
Start: 2023-04-01

## 2023-04-12 RX ORDER — DILTIAZEM HYDROCHLORIDE 120 MG/1
120 CAPSULE, COATED, EXTENDED RELEASE ORAL DAILY
Qty: 90 CAPSULE | Refills: 3 | Status: SHIPPED | OUTPATIENT
Start: 2023-04-12

## 2023-04-12 RX ORDER — TRAZODONE HYDROCHLORIDE 300 MG/1
300 TABLET ORAL
Qty: 90 TABLET | Refills: 3 | Status: SHIPPED | OUTPATIENT
Start: 2023-04-12

## 2023-04-12 RX ORDER — DULAGLUTIDE 3 MG/.5ML
3 INJECTION, SOLUTION SUBCUTANEOUS WEEKLY
Qty: 9 ML | Refills: 3 | Status: SHIPPED | OUTPATIENT
Start: 2023-04-12

## 2023-04-12 RX ORDER — PANTOPRAZOLE SODIUM 40 MG/1
40 TABLET, DELAYED RELEASE ORAL NIGHTLY
Qty: 90 TABLET | Refills: 3 | Status: SHIPPED | OUTPATIENT
Start: 2023-04-12

## 2023-04-12 RX ORDER — GABAPENTIN 600 MG/1
600 TABLET ORAL 3 TIMES DAILY
Qty: 90 TABLET | Refills: 0 | Status: SHIPPED | OUTPATIENT
Start: 2023-04-12

## 2023-04-12 RX ORDER — CLONIDINE HYDROCHLORIDE 0.1 MG/1
0.2 TABLET ORAL ONCE
Status: COMPLETED | OUTPATIENT
Start: 2023-04-12 | End: 2023-04-12

## 2023-04-12 RX ORDER — PRAMIPEXOLE DIHYDROCHLORIDE 1 MG/1
2 TABLET ORAL NIGHTLY
Qty: 180 TABLET | Refills: 3 | Status: SHIPPED | OUTPATIENT
Start: 2023-04-12

## 2023-04-12 RX ORDER — FENOFIBRATE 145 MG/1
145 TABLET, COATED ORAL DAILY
Qty: 90 TABLET | Refills: 3 | Status: SHIPPED | OUTPATIENT
Start: 2023-04-12 | End: 2024-04-12

## 2023-04-12 RX ORDER — FLUOXETINE HYDROCHLORIDE 20 MG/1
20 CAPSULE ORAL DAILY
Qty: 90 CAPSULE | Refills: 3 | Status: SHIPPED | OUTPATIENT
Start: 2023-04-12

## 2023-04-12 RX ADMIN — CLONIDINE HYDROCHLORIDE 0.2 MG: 0.1 TABLET ORAL at 08:49

## 2023-04-12 NOTE — PROGRESS NOTES
CC: Hospital Follow Up Visit ([facility] ER FU)      Subjective:  Yordy Hansen is a 42 y.o. male who presents for         Patient presents to office for hospital discharge follow-up.  He was admitted to [facility] on 3/30/2023 and discharged on 4/1/2023.  His admission diagnosis was hyperglycemia and chest pain.  Prior to his admission, he was treated in the ER on 3/29 after eating with similar symptoms.  He was treated with a DuoNeb dexamethasone, and Lasix and discharged home per patient request.  He then returned to the ER with chest pain and shortness of breath.  He has coronary artery disease with stents x3.  His initial blood sugar was 537.  He does have an insulin pump but questioned if it was working.  He was admitted.  Chest x-ray revealed mild pulmonary vascular congestion.  He was started on a diuretic.  Patient has been noncompliant with home medications.  Cardio was consulted.  Echocardiogram revealed preserved LVEF, mild tricuspid regurgitation.  He was to continue his home medications and Dr. Avelar added Crestor and Brilinta.  His A1c was 11.  He was started on 30 units glargine (Semglee) and 10 units 3 times daily of Humalog.  To be titrated per me. Pt states pharmacy wouldn't fill this because he is still on the insulin pump.  Patient has been under the care of endocrinology for his diabetes.  We will rerefer him for this.  He is to follow-up with Dr. Avelar in 1 week. He saw him on yesterday. States continues to have chest pain at least daily. States goes back to see Dr. Caldwell in 1 week.     Patient does state that he had moved to [place] and had stopped taking all of his home medications prior to going to the ER. He has since moved back to [facility].    Patient's blood pressure is 160/112 in the office today.  He has been given clonidine 0.2 mg while in office. He states he is only taking the Lisinopril and Metoprolol now. Needs refills on his other  medications. Was given Lasix, but states he hasn't taken this because he is already voiding a lot. Advised this is likely due to his sugar being out of control. Advised to take the Lasix.       The following portions of the patient's history were reviewed and updated as appropriate: allergies, current medications, past family history, past medical history, past social history, past surgical history and problem list.    Past Medical History:   Diagnosis Date   • Asthma    • CAD (coronary artery disease)    • Chronic back pain    • Chronic pulmonary embolism    • Coronary artery disease    • Diabetes mellitus    • Factor II deficiency    • Fatty liver    • GERD (gastroesophageal reflux disease)    • Hyperlipidemia    • Hypertension    • Morbid obesity    • RLS (restless legs syndrome)    • Seizures    • Sleep apnea          Current Outpatient Medications:   •  albuterol sulfate  (90 Base) MCG/ACT inhaler, Inhale 2 puffs Every 6 (Six) Hours As Needed for Wheezing or Shortness of Air., Disp: 18 g, Rfl: 0  •  Brilinta 90 MG tablet tablet, Take 1 tablet by mouth Every 12 (Twelve) Hours., Disp: , Rfl:   •  busPIRone (BUSPAR) 10 MG tablet, Take 1 tablet by mouth 3 (Three) Times a Day As Needed (Anxiety)., Disp: 90 tablet, Rfl: 5  •  Diclofenac Sodium (VOLTAREN) 1 % gel gel, Apply 4 g topically to the appropriate area as directed 4 (Four) Times a Day As Needed (pain)., Disp: 100 g, Rfl: 3  •  dilTIAZem CD (Cardizem CD) 120 MG 24 hr capsule, Take 1 capsule by mouth Daily., Disp: 90 capsule, Rfl: 3  •  Dulaglutide (Trulicity) 3 MG/0.5ML solution pen-injector, Inject 0.5 mL under the skin into the appropriate area as directed 1 (One) Time Per Week., Disp: 9 mL, Rfl: 3  •  Evolocumab (Repatha SureClick) solution auto-injector SureClick injection, Inject 1 mL under the skin into the appropriate area as directed Every 14 (Fourteen) Days., Disp: 2 pen, Rfl: 5  •  fenofibrate (TRICOR) 145 MG tablet, Take 1 tablet by mouth  "Daily., Disp: 90 tablet, Rfl: 3  •  FLUoxetine (PROzac) 20 MG capsule, Take 1 capsule by mouth Daily., Disp: 90 capsule, Rfl: 3  •  glucose blood test strip, Test 4 times daily ,E10.65, Disp: 120 each, Rfl: 11  •  glucose monitor monitoring kit, 1 each As Needed (check blood glucose 3x daily)., Disp: 1 each, Rfl: 3  •  HumaLOG KwikPen 100 UNIT/ML solution pen-injector, , Disp: , Rfl:   •  Insulin Disposable Pump (Omnipod DASH Pods, Gen 4,) misc, 1 package Daily., Disp: 6 each, Rfl: 11  •  insulin lispro (HumaLOG) 100 UNIT/ML injection, 150 units daily through pump, Disp: 60 mL, Rfl: 5  •  Insulin Syringe-Needle U-100 25G X 1\" 1 ML misc, 1 syringe 4 (Four) Times a Day With Meals & at Bedtime., Disp: 200 each, Rfl: 5  •  ipratropium-albuterol (DUO-NEB) 0.5-2.5 mg/3 ml nebulizer, Take 3 mL by nebulization 4 (Four) Times a Day., Disp: 360 mL, Rfl: 1  •  Lancets misc, Test 4 times daily,E10.65, Disp: 120 each, Rfl: 11  •  lisinopril (PRINIVIL,ZESTRIL) 10 MG tablet, Take 1 tablet by mouth Daily., Disp: , Rfl:   •  metoprolol succinate XL (TOPROL-XL) 50 MG 24 hr tablet, Take 1 tablet by mouth Daily. NEEDS APPT, Disp: 30 tablet, Rfl: 0  •  Microlet Lancets misc, One touch delica lancets, Disp: 150 each, Rfl: 5  •  nitroglycerin (Nitro-Dur) 0.4 MG/HR patch, Place 1 patch on the skin as directed by provider Daily., Disp: 30 each, Rfl: 0  •  pantoprazole (PROTONIX) 40 MG EC tablet, Take 1 tablet by mouth Every Night., Disp: 90 tablet, Rfl: 3  •  pramipexole (MIRAPEX) 1 MG tablet, Take 2 tablets by mouth Every Night., Disp: 180 tablet, Rfl: 3  •  rivaroxaban (XARELTO) 20 MG tablet, Take 1 tablet by mouth Daily With Dinner., Disp: 90 tablet, Rfl: 3  •  SITagliptin (Januvia) 100 MG tablet, Take 1 tablet by mouth Daily. NEEDS APPT, Disp: 90 tablet, Rfl: 3  •  tamsulosin (FLOMAX) 0.4 MG capsule 24 hr capsule, Take 1 capsule by mouth Daily., Disp: , Rfl:   •  traZODone (DESYREL) 300 MG tablet, Take 1 tablet by mouth every night at " "bedtime., Disp: 90 tablet, Rfl: 3  •  gabapentin (NEURONTIN) 600 MG tablet, Take 1 tablet by mouth 3 (Three) Times a Day., Disp: 90 tablet, Rfl: 0  •  nitroglycerin (NITROSTAT) 0.4 MG SL tablet, Place 1 tablet under the tongue Every 5 (Five) Minutes As Needed for Chest Pain for up to 15 days., Disp: 25 tablet, Rfl: 6  •  rosuvastatin (CRESTOR) 40 MG tablet, Take 40 mg by mouth., Disp: , Rfl:     Current Facility-Administered Medications:   •  cloNIDine (CATAPRES) tablet 0.2 mg, 0.2 mg, Oral, Once, Mami Perez APRN    Review of Systems    Review of Systems   Constitutional: Negative.    HENT: Negative.    Eyes: Negative.    Respiratory: Positive for shortness of breath.    Cardiovascular: Positive for chest pain and leg swelling.   Gastrointestinal: Negative.    Endocrine: Negative.    Genitourinary: Negative.    Musculoskeletal: Negative.    Skin: Negative.    Allergic/Immunologic: Negative.    Neurological: Negative.    Hematological: Negative.    Psychiatric/Behavioral: Negative.    All other systems reviewed and are negative.      Objective  Vitals:    04/12/23 1101   BP: (!) 160/112   Pulse: 96   SpO2: 94%   Weight: (!) 206 kg (455 lb)   Height: 180.3 cm (71\")     Body mass index is 63.46 kg/m².    Physical Exam    Physical Exam  Vitals and nursing note reviewed.   Constitutional:       General: He is not in acute distress.     Appearance: Normal appearance. He is not ill-appearing, toxic-appearing or diaphoretic.   HENT:      Head: Normocephalic and atraumatic.   Cardiovascular:      Rate and Rhythm: Normal rate and regular rhythm.      Pulses: Normal pulses.      Heart sounds: Normal heart sounds. No murmur heard.    No friction rub. No gallop.   Pulmonary:      Effort: Pulmonary effort is normal. No respiratory distress.      Breath sounds: Normal breath sounds. No stridor. No wheezing, rhonchi or rales.   Chest:      Chest wall: No tenderness.   Musculoskeletal:      Cervical back: Normal range of " motion and neck supple. No rigidity or tenderness.      Right lower leg: Edema present.      Left lower leg: Edema present.      Comments: 1+ pitting edema noted to bilateral lower extremities   Lymphadenopathy:      Cervical: No cervical adenopathy.   Skin:     General: Skin is warm and dry.      Capillary Refill: Capillary refill takes less than 2 seconds.      Findings: No rash.   Neurological:      General: No focal deficit present.      Mental Status: He is alert and oriented to person, place, and time. Mental status is at baseline.             Diagnoses and all orders for this visit:    1. Hospital discharge follow-up (Primary)    2. Chest pain, unspecified type    3. Type 2 diabetes mellitus with hyperglycemia, with long-term current use of insulin (HCC)  -     SITagliptin (Januvia) 100 MG tablet; Take 1 tablet by mouth Daily. NEEDS APPT  Dispense: 90 tablet; Refill: 3    4. Essential hypertension  -     cloNIDine (CATAPRES) tablet 0.2 mg    5. Non-compliance    6. Type 2 diabetes mellitus with hyperglycemia, with long-term current use of insulin  -     SITagliptin (Januvia) 100 MG tablet; Take 1 tablet by mouth Daily. NEEDS APPT  Dispense: 90 tablet; Refill: 3    7. RLS (restless legs syndrome)  -     pramipexole (MIRAPEX) 1 MG tablet; Take 2 tablets by mouth Every Night.  Dispense: 180 tablet; Refill: 3    8. Anxiety  -     FLUoxetine (PROzac) 20 MG capsule; Take 1 capsule by mouth Daily.  Dispense: 90 capsule; Refill: 3    9. Grief  -     FLUoxetine (PROzac) 20 MG capsule; Take 1 capsule by mouth Daily.  Dispense: 90 capsule; Refill: 3    10. Type 2 diabetes mellitus with diabetic neuropathy, with long-term current use of insulin  -     Dulaglutide (Trulicity) 3 MG/0.5ML solution pen-injector; Inject 0.5 mL under the skin into the appropriate area as directed 1 (One) Time Per Week.  Dispense: 9 mL; Refill: 3    11. Insomnia, unspecified type  -     traZODone (DESYREL) 300 MG tablet; Take 1 tablet by mouth  every night at bedtime.  Dispense: 90 tablet; Refill: 3    12. Gastroesophageal reflux disease without esophagitis  -     pantoprazole (PROTONIX) 40 MG EC tablet; Take 1 tablet by mouth Every Night.  Dispense: 90 tablet; Refill: 3    13. Diabetic peripheral neuropathy associated with type 2 diabetes mellitus  -     gabapentin (NEURONTIN) 600 MG tablet; Take 1 tablet by mouth 3 (Three) Times a Day.  Dispense: 90 tablet; Refill: 0    14. Mixed hyperlipidemia  -     fenofibrate (TRICOR) 145 MG tablet; Take 1 tablet by mouth Daily.  Dispense: 90 tablet; Refill: 3    Other orders  -     dilTIAZem CD (Cardizem CD) 120 MG 24 hr capsule; Take 1 capsule by mouth Daily.  Dispense: 90 capsule; Refill: 3       Patient in for hospital discharge follow-up.  He continues to have chest pain daily.  He was evaluated per cardiology on yesterday.  He also has a follow-up in 1 week.  He is advised if any acute worsening in symptoms, to go to the ER.  Patient has a long history of noncompliance with medications.  His blood pressure initially in office was 160/112.  He was given clonidine 0.2 mg while in office.  His blood pressure did come down to 148/102 prior to discharge.  I have refilled his routine medications as requested.  He is encouraged to keep his follow-up with cardiology.  His diabetes is uncontrolled.  He is advised to call endocrinology and get back in with them for further evaluation and treatment of the diabetes.  Patient is to follow-up in 3 months or sooner if needed.  At that time we will perform an annual wellness visit with labs.  Answered all questions.  Patient verbalized understanding of plan of care.    Patient understands the risks associated with this controlled medication, including tolerance and addiction.  he also agrees to only obtain this medication from me, and not from a another provider, unless that provider is covering for me in my absence.  he also agrees to be compliant in dosing, and not self  adjust the dose of medication.  A signed controlled substance agreement is on file, and he has received a controlled substance education sheet at this a previous visit.  he has also signed a consent for treatment with a controlled substance as per [facility] policy. CARY was obtained.        This document has been electronically signed by LALA Munoz on April 12, 2023 12:04 CDT

## 2023-04-14 ENCOUNTER — APPOINTMENT (OUTPATIENT)
Dept: GENERAL RADIOLOGY | Facility: HOSPITAL | Age: 43
End: 2023-04-14
Payer: MEDICAID

## 2023-04-14 ENCOUNTER — APPOINTMENT (OUTPATIENT)
Dept: CT IMAGING | Facility: HOSPITAL | Age: 43
End: 2023-04-14
Payer: MEDICAID

## 2023-04-14 ENCOUNTER — HOSPITAL ENCOUNTER (EMERGENCY)
Facility: HOSPITAL | Age: 43
Discharge: HOME | End: 2023-04-14
Attending: FAMILY MEDICINE | Admitting: STUDENT IN AN ORGANIZED HEALTH CARE EDUCATION/TRAINING PROGRAM
Payer: MEDICAID

## 2023-04-14 VITALS
WEIGHT: 315 LBS | HEART RATE: 74 BPM | DIASTOLIC BLOOD PRESSURE: 68 MMHG | TEMPERATURE: 98.1 F | OXYGEN SATURATION: 94 % | RESPIRATION RATE: 26 BRPM | BODY MASS INDEX: 44.1 KG/M2 | HEIGHT: 71 IN | SYSTOLIC BLOOD PRESSURE: 129 MMHG

## 2023-04-14 DIAGNOSIS — R06.02 SOB (SHORTNESS OF BREATH): Primary | ICD-10-CM

## 2023-04-14 LAB
ALBUMIN SERPL-MCNC: 3.5 G/DL (ref 3.5–5.2)
ALBUMIN/GLOB SERPL: 0.9 G/DL
ALP SERPL-CCNC: 80 U/L (ref 39–117)
ALT SERPL W P-5'-P-CCNC: 25 U/L (ref 1–41)
ANION GAP SERPL CALCULATED.3IONS-SCNC: 11 MMOL/L (ref 5–15)
AST SERPL-CCNC: 22 U/L (ref 1–40)
BASOPHILS # BLD AUTO: 0.05 10*3/MM3 (ref 0–0.2)
BASOPHILS NFR BLD AUTO: 0.5 % (ref 0–1.5)
BILIRUB SERPL-MCNC: 0.4 MG/DL (ref 0–1.2)
BUN SERPL-MCNC: 13 MG/DL (ref 6–20)
BUN/CREAT SERPL: 17.3 (ref 7–25)
CALCIUM SPEC-SCNC: 9.4 MG/DL (ref 8.6–10.5)
CHLORIDE SERPL-SCNC: 93 MMOL/L (ref 98–107)
CO2 SERPL-SCNC: 28 MMOL/L (ref 22–29)
CREAT SERPL-MCNC: 0.75 MG/DL (ref 0.76–1.27)
DEPRECATED RDW RBC AUTO: 39.7 FL (ref 37–54)
EGFRCR SERPLBLD CKD-EPI 2021: 114.1 ML/MIN/1.73
EOSINOPHIL # BLD AUTO: 0.29 10*3/MM3 (ref 0–0.4)
EOSINOPHIL NFR BLD AUTO: 2.7 % (ref 0.3–6.2)
ERYTHROCYTE [DISTWIDTH] IN BLOOD BY AUTOMATED COUNT: 13.3 % (ref 12.3–15.4)
GEN 5 2HR TROPONIN T REFLEX: 16 NG/L
GLOBULIN UR ELPH-MCNC: 3.8 GM/DL
GLUCOSE SERPL-MCNC: 362 MG/DL (ref 65–99)
HCT VFR BLD AUTO: 43.1 % (ref 37.5–51)
HGB BLD-MCNC: 14.9 G/DL (ref 13–17.7)
HOLD SPECIMEN: NORMAL
HOLD SPECIMEN: NORMAL
IMM GRANULOCYTES # BLD AUTO: 0.17 10*3/MM3 (ref 0–0.05)
IMM GRANULOCYTES NFR BLD AUTO: 1.6 % (ref 0–0.5)
LYMPHOCYTES # BLD AUTO: 1.74 10*3/MM3 (ref 0.7–3.1)
LYMPHOCYTES NFR BLD AUTO: 16.3 % (ref 19.6–45.3)
MCH RBC QN AUTO: 28.5 PG (ref 26.6–33)
MCHC RBC AUTO-ENTMCNC: 34.6 G/DL (ref 31.5–35.7)
MCV RBC AUTO: 82.6 FL (ref 79–97)
MONOCYTES # BLD AUTO: 0.83 10*3/MM3 (ref 0.1–0.9)
MONOCYTES NFR BLD AUTO: 7.8 % (ref 5–12)
NEUTROPHILS NFR BLD AUTO: 7.61 10*3/MM3 (ref 1.7–7)
NEUTROPHILS NFR BLD AUTO: 71.1 % (ref 42.7–76)
NRBC BLD AUTO-RTO: 0 /100 WBC (ref 0–0.2)
NT-PROBNP SERPL-MCNC: <36 PG/ML (ref 0–450)
PLATELET # BLD AUTO: 222 10*3/MM3 (ref 140–450)
PMV BLD AUTO: 8.8 FL (ref 6–12)
POTASSIUM SERPL-SCNC: 4.1 MMOL/L (ref 3.5–5.2)
PROT SERPL-MCNC: 7.3 G/DL (ref 6–8.5)
RBC # BLD AUTO: 5.22 10*6/MM3 (ref 4.14–5.8)
SODIUM SERPL-SCNC: 132 MMOL/L (ref 136–145)
TROPONIN T DELTA: 0 NG/L
TROPONIN T SERPL HS-MCNC: 16 NG/L
WBC NRBC COR # BLD: 10.69 10*3/MM3 (ref 3.4–10.8)
WHOLE BLOOD HOLD COAG: NORMAL
WHOLE BLOOD HOLD SPECIMEN: NORMAL

## 2023-04-14 PROCEDURE — 36415 COLL VENOUS BLD VENIPUNCTURE: CPT

## 2023-04-14 PROCEDURE — 99284 EMERGENCY DEPT VISIT MOD MDM: CPT

## 2023-04-14 PROCEDURE — 71045 X-RAY EXAM CHEST 1 VIEW: CPT

## 2023-04-14 PROCEDURE — 25510000001 IOPAMIDOL PER 1 ML: Performed by: FAMILY MEDICINE

## 2023-04-14 PROCEDURE — 85025 COMPLETE CBC W/AUTO DIFF WBC: CPT | Performed by: FAMILY MEDICINE

## 2023-04-14 PROCEDURE — 93005 ELECTROCARDIOGRAM TRACING: CPT | Performed by: FAMILY MEDICINE

## 2023-04-14 PROCEDURE — 71275 CT ANGIOGRAPHY CHEST: CPT

## 2023-04-14 PROCEDURE — 84484 ASSAY OF TROPONIN QUANT: CPT | Performed by: FAMILY MEDICINE

## 2023-04-14 PROCEDURE — 80053 COMPREHEN METABOLIC PANEL: CPT | Performed by: FAMILY MEDICINE

## 2023-04-14 PROCEDURE — 83880 ASSAY OF NATRIURETIC PEPTIDE: CPT | Performed by: FAMILY MEDICINE

## 2023-04-14 RX ORDER — SODIUM CHLORIDE 0.9 % (FLUSH) 0.9 %
10 SYRINGE (ML) INJECTION AS NEEDED
Status: DISCONTINUED | OUTPATIENT
Start: 2023-04-14 | End: 2023-04-14 | Stop reason: HOSPADM

## 2023-04-14 RX ADMIN — IOPAMIDOL 150 ML: 755 INJECTION, SOLUTION INTRAVENOUS at 18:21

## 2023-04-14 NOTE — ED PROVIDER NOTES
Subjective   History of Present Illness  Patient is a 42 years old with past medical history of diabetes, hypertension, hyperlipidemia, A-fib and morbid obesity who is noncompliant to his medication presented here today because of shortness of breath.  Patient said that he was just discharged from [facility] 3 days ago.  Denies any fever chills or sweating.  Denies any chest pain.    History provided by:  Patient   used: No        Review of Systems   Respiratory: Positive for shortness of breath.    All other systems reviewed and are negative.      Past Medical History:   Diagnosis Date   • Asthma    • CAD (coronary artery disease)    • Chronic back pain    • Chronic pulmonary embolism    • Coronary artery disease    • Diabetes mellitus    • Factor II deficiency    • Fatty liver    • GERD (gastroesophageal reflux disease)    • Hyperlipidemia    • Hypertension    • Morbid obesity    • RLS (restless legs syndrome)    • Seizures    • Sleep apnea        Allergies   Allergen Reactions   • Glipizide Other (See Comments) and Unknown (See Comments)     Slurred Speech  Slurred Speech  Hallucinations, Slurred Speech   • Reglan [Metoclopramide] Anxiety   • Tramadol Other (See Comments)     seizures   • Risperidone Other (See Comments)     Slurred speech  Can't stand, trouble breathing, slurred speech         Past Surgical History:   Procedure Laterality Date   • CARDIAC CATHETERIZATION N/A 3/15/2017   • CARDIAC CATHETERIZATION N/A 3/15/2017   • CARDIAC CATHETERIZATION N/A 3/1/2018   • CARDIAC CATHETERIZATION N/A 9/2/2020   • CHOLECYSTECTOMY     • CORONARY ANGIOPLASTY WITH STENT PLACEMENT     • IA RT/LT HEART CATHETERS N/A 3/15/2017   • TRANSESOPHAGEAL ECHOCARDIOGRAM (MONICA)         Family History   Problem Relation Age of Onset   • Heart disease Mother    • Obesity Mother    • Sleep apnea Father    • Cancer Paternal Grandfather        Social History     Socioeconomic History   • Marital status: Single      Spouse name: Cori   • Number of children: 0   Tobacco Use   • Smoking status: Former     Packs/day: 0.25     Years: 0.50     Pack years: 0.13     Types: Cigarettes     Quit date:      Years since quittin.2   • Smokeless tobacco: Current     Types: Snuff   Vaping Use   • Vaping Use: Never used   Substance and Sexual Activity   • Alcohol use: No   • Drug use: No   • Sexual activity: Not Currently           Objective   Physical Exam  Vitals and nursing note reviewed.   Constitutional:       Appearance: He is well-developed. He is obese.   HENT:      Head: Normocephalic and atraumatic.      Mouth/Throat:      Mouth: Mucous membranes are moist.   Eyes:      Extraocular Movements: Extraocular movements intact.      Pupils: Pupils are equal, round, and reactive to light.   Cardiovascular:      Rate and Rhythm: Normal rate and regular rhythm.      Pulses: Normal pulses.      Heart sounds: Normal heart sounds.   Pulmonary:      Effort: Pulmonary effort is normal.      Breath sounds: Rales present.   Musculoskeletal:         General: Normal range of motion.      Cervical back: Normal range of motion and neck supple.   Skin:     General: Skin is warm.      Capillary Refill: Capillary refill takes less than 2 seconds.   Neurological:      General: No focal deficit present.      Mental Status: He is alert and oriented to person, place, and time.         ECG 12 Lead Chest Pain      Date/Time: 2023 5:06 PM  Performed by: Alan Root MD  Authorized by: Alan Root MD   Interpreted by physician  Rhythm: sinus rhythm  Rate: normal  BPM: 80  Conduction: right bundle branch block  Other: no other findings  Clinical impression: abnormal ECG                 ED Course  ED Course as of 23 Patient checked out to me by Dr. Root.  Labs unremarkable.  Troponin slightly elevated 16 with no change on repeat.  CTA chest negative for acute findings.  No PE noted.   Recommend patient follow-up with his PCP.  Return precautions given.  Patient states understanding and is agreeable to the plan. []      ED Course User Index  [] Bob Ruiz MD                                           Medical Decision Making  Amount and/or Complexity of Data Reviewed  Labs: ordered.  Radiology: ordered.  ECG/medicine tests: ordered.      Risk  Prescription drug management.          Final diagnoses:   SOB (shortness of breath)       ED Disposition  ED Disposition     ED Disposition   Discharge    Condition   Stable    Comment   --             Mami Perez, LALA  [address] [place], [place] [id number] [phone]    Schedule an appointment as soon as possible for a visit in 2 days  ER follow up         Medication List      No changes were made to your prescriptions during this visit.          Bob Ruiz MD  04/14/23 1951

## 2023-04-15 LAB
QT INTERVAL: 420 MS
QTC INTERVAL: 484 MS

## 2023-05-05 LAB
QT INTERVAL: 420 MS
QTC INTERVAL: 484 MS

## 2023-07-28 NOTE — OUTREACH NOTE
"Ambulatory Case Management Note    Patient Outreach    Received [facility] questionnaire with BPA responses. Spoke with patient to review the questions/answers listed below.     Question: How hard is it for you to pay for the very basics like food, housing, medical care, and heating?  Answer:   Hard     Question: Within the past 12 months, you worried that your food would run out before you got the money to buy more.  Answer:   Often true     Question: Within the past 12 months, the food you bought just didn’t last and you didn’t have money to get more.  Answer:   Often true     Patient reports today the PACS office paid his power bill. He uses [employer] once a month. He has applied for food stamps in the past but only received $5 in assistance. ACM suggested he reapply. He stated the food will last a few days. Inquired if friends or family could assist and \"I just do the best I can\". Provided with [facility] and [employer] for food assistance. AC suggested he contact [employer] to inquire about meal assistance. Reviewed with patient [employer] possibilities for transportation assistance if needed(currently not an issue) and life alert(lives alone).       Bella Julian RN  Ambulatory Case Management    1/12/2022, 12:43 CST    " Pain Refusal Text: I offered to prescribe pain medication but the patient refused to take this medication.

## 2023-07-31 ENCOUNTER — OFFICE VISIT (OUTPATIENT)
Dept: FAMILY MEDICINE CLINIC | Facility: CLINIC | Age: 43
End: 2023-07-31
Payer: MEDICARE

## 2023-07-31 ENCOUNTER — LAB (OUTPATIENT)
Dept: LAB | Facility: OTHER | Age: 43
End: 2023-07-31
Payer: MEDICARE

## 2023-07-31 VITALS — OXYGEN SATURATION: 94 % | HEIGHT: 71 IN | BODY MASS INDEX: 44.1 KG/M2 | HEART RATE: 85 BPM | WEIGHT: 315 LBS

## 2023-07-31 DIAGNOSIS — E86.0 DEHYDRATION: ICD-10-CM

## 2023-07-31 DIAGNOSIS — I25.118 CORONARY ARTERY DISEASE OF NATIVE ARTERY OF NATIVE HEART WITH STABLE ANGINA PECTORIS: ICD-10-CM

## 2023-07-31 DIAGNOSIS — E11.65 TYPE 2 DIABETES MELLITUS WITH HYPERGLYCEMIA, WITH LONG-TERM CURRENT USE OF INSULIN: ICD-10-CM

## 2023-07-31 DIAGNOSIS — K21.9 GASTROESOPHAGEAL REFLUX DISEASE WITHOUT ESOPHAGITIS: ICD-10-CM

## 2023-07-31 DIAGNOSIS — Z79.4 TYPE 2 DIABETES MELLITUS WITH HYPERGLYCEMIA, WITH LONG-TERM CURRENT USE OF INSULIN: ICD-10-CM

## 2023-07-31 DIAGNOSIS — Z91.199 NON-COMPLIANCE: ICD-10-CM

## 2023-07-31 DIAGNOSIS — I10 PRIMARY HYPERTENSION: ICD-10-CM

## 2023-07-31 DIAGNOSIS — Z00.00 MEDICARE ANNUAL WELLNESS VISIT, SUBSEQUENT: Primary | ICD-10-CM

## 2023-07-31 DIAGNOSIS — G47.00 INSOMNIA, UNSPECIFIED TYPE: Chronic | ICD-10-CM

## 2023-07-31 DIAGNOSIS — G25.81 RLS (RESTLESS LEGS SYNDROME): ICD-10-CM

## 2023-07-31 DIAGNOSIS — E78.2 MIXED HYPERLIPIDEMIA: ICD-10-CM

## 2023-07-31 DIAGNOSIS — T14.8XXA WOUND OF SKIN: ICD-10-CM

## 2023-07-31 DIAGNOSIS — F43.21 GRIEF: ICD-10-CM

## 2023-07-31 DIAGNOSIS — Z99.89 OSA ON CPAP: Chronic | ICD-10-CM

## 2023-07-31 DIAGNOSIS — R53.1 GENERALIZED WEAKNESS: ICD-10-CM

## 2023-07-31 DIAGNOSIS — Z00.00 MEDICARE ANNUAL WELLNESS VISIT, SUBSEQUENT: ICD-10-CM

## 2023-07-31 DIAGNOSIS — I27.82 OTHER CHRONIC PULMONARY EMBOLISM WITHOUT ACUTE COR PULMONALE: Chronic | ICD-10-CM

## 2023-07-31 DIAGNOSIS — G93.41 METABOLIC ENCEPHALOPATHY: ICD-10-CM

## 2023-07-31 DIAGNOSIS — F41.9 ANXIETY: ICD-10-CM

## 2023-07-31 DIAGNOSIS — E11.42 DIABETIC PERIPHERAL NEUROPATHY ASSOCIATED WITH TYPE 2 DIABETES MELLITUS: ICD-10-CM

## 2023-07-31 DIAGNOSIS — J45.20 MILD INTERMITTENT ASTHMA WITHOUT COMPLICATION: ICD-10-CM

## 2023-07-31 DIAGNOSIS — G47.33 OSA ON CPAP: Chronic | ICD-10-CM

## 2023-07-31 DIAGNOSIS — Z79.899 HIGH RISK MEDICATION USE: ICD-10-CM

## 2023-07-31 DIAGNOSIS — T67.1XXD HEAT SYNCOPE, SUBSEQUENT ENCOUNTER: ICD-10-CM

## 2023-07-31 DIAGNOSIS — E66.01 MORBID OBESITY: Chronic | ICD-10-CM

## 2023-07-31 PROBLEM — Z86.79 HISTORY OF CORONARY ARTERY DISEASE: Status: RESOLVED | Noted: 2019-10-26 | Resolved: 2023-07-31

## 2023-07-31 LAB
ALBUMIN SERPL-MCNC: 4 G/DL (ref 3.5–5)
ALBUMIN/GLOB SERPL: 0.9 G/DL (ref 1.1–1.8)
ALP SERPL-CCNC: 83 U/L (ref 38–126)
ALT SERPL W P-5'-P-CCNC: 53 U/L
ANION GAP SERPL CALCULATED.3IONS-SCNC: 7 MMOL/L (ref 5–15)
ANISOCYTOSIS BLD QL: ABNORMAL
AST SERPL-CCNC: 49 U/L (ref 17–59)
BILIRUB SERPL-MCNC: 0.7 MG/DL (ref 0.2–1.3)
BUN SERPL-MCNC: 10 MG/DL (ref 7–23)
BUN/CREAT SERPL: 14.9 (ref 7–25)
CALCIUM SPEC-SCNC: 9.1 MG/DL (ref 8.4–10.2)
CHLORIDE SERPL-SCNC: 98 MMOL/L (ref 101–112)
CHOLEST SERPL-MCNC: 231 MG/DL (ref 150–200)
CO2 SERPL-SCNC: 31 MMOL/L (ref 22–30)
CREAT SERPL-MCNC: 0.67 MG/DL (ref 0.7–1.3)
DEPRECATED RDW RBC AUTO: 44.9 FL (ref 37–54)
EGFRCR SERPLBLD CKD-EPI 2021: 118.1 ML/MIN/1.73
EOSINOPHIL # BLD MANUAL: 0.54 10*3/MM3 (ref 0–0.4)
EOSINOPHIL NFR BLD MANUAL: 7 % (ref 0.3–6.2)
ERYTHROCYTE [DISTWIDTH] IN BLOOD BY AUTOMATED COUNT: 14.2 % (ref 12.3–15.4)
GLOBULIN UR ELPH-MCNC: 4.4 GM/DL (ref 2.3–3.5)
GLUCOSE SERPL-MCNC: 233 MG/DL (ref 70–99)
HCT VFR BLD AUTO: 46.9 % (ref 37.5–51)
HDLC SERPL-MCNC: 21 MG/DL (ref 40–59)
HGB BLD-MCNC: 16 G/DL (ref 13–17.7)
LDLC SERPL CALC-MCNC: 125 MG/DL
LDLC/HDLC SERPL: 5.5 {RATIO} (ref 0–3.55)
LYMPHOCYTES # BLD MANUAL: 1.92 10*3/MM3 (ref 0.7–3.1)
LYMPHOCYTES NFR BLD MANUAL: 7 % (ref 5–12)
MCH RBC QN AUTO: 29.6 PG (ref 26.6–33)
MCHC RBC AUTO-ENTMCNC: 34.1 G/DL (ref 31.5–35.7)
MCV RBC AUTO: 86.7 FL (ref 79–97)
MONOCYTES # BLD: 0.54 10*3/MM3 (ref 0.1–0.9)
NEUTROPHILS # BLD AUTO: 4.68 10*3/MM3 (ref 1.7–7)
NEUTROPHILS NFR BLD MANUAL: 57 % (ref 42.7–76)
NEUTS BAND NFR BLD MANUAL: 4 % (ref 0–5)
PLATELET # BLD AUTO: 212 10*3/MM3 (ref 140–450)
PMV BLD AUTO: 8.8 FL (ref 6–12)
POTASSIUM SERPL-SCNC: 4.5 MMOL/L (ref 3.4–5)
PROT SERPL-MCNC: 8.4 G/DL (ref 6.3–8.6)
RBC # BLD AUTO: 5.41 10*6/MM3 (ref 4.14–5.8)
SMALL PLATELETS BLD QL SMEAR: ADEQUATE
SODIUM SERPL-SCNC: 136 MMOL/L (ref 137–145)
TRIGL SERPL-MCNC: 472 MG/DL
VARIANT LYMPHS NFR BLD MANUAL: 25 % (ref 19.6–45.3)
VLDLC SERPL-MCNC: 85 MG/DL (ref 5–40)
WBC MORPH BLD: NORMAL
WBC NRBC COR # BLD: 7.67 10*3/MM3 (ref 3.4–10.8)

## 2023-07-31 PROCEDURE — 84443 ASSAY THYROID STIM HORMONE: CPT | Performed by: NURSE PRACTITIONER

## 2023-07-31 PROCEDURE — 36415 COLL VENOUS BLD VENIPUNCTURE: CPT | Performed by: NURSE PRACTITIONER

## 2023-07-31 PROCEDURE — G0481 DRUG TEST DEF 8-14 CLASSES: HCPCS | Performed by: NURSE PRACTITIONER

## 2023-07-31 PROCEDURE — 80053 COMPREHEN METABOLIC PANEL: CPT | Performed by: NURSE PRACTITIONER

## 2023-07-31 PROCEDURE — 80307 DRUG TEST PRSMV CHEM ANLYZR: CPT | Performed by: NURSE PRACTITIONER

## 2023-07-31 PROCEDURE — 85025 COMPLETE CBC W/AUTO DIFF WBC: CPT | Performed by: NURSE PRACTITIONER

## 2023-07-31 PROCEDURE — 80061 LIPID PANEL: CPT | Performed by: NURSE PRACTITIONER

## 2023-07-31 PROCEDURE — 82043 UR ALBUMIN QUANTITATIVE: CPT | Performed by: NURSE PRACTITIONER

## 2023-07-31 PROCEDURE — 1170F FXNL STATUS ASSESSED: CPT | Performed by: NURSE PRACTITIONER

## 2023-07-31 PROCEDURE — 1159F MED LIST DOCD IN RCRD: CPT | Performed by: NURSE PRACTITIONER

## 2023-07-31 PROCEDURE — 83036 HEMOGLOBIN GLYCOSYLATED A1C: CPT | Performed by: NURSE PRACTITIONER

## 2023-07-31 PROCEDURE — 1160F RVW MEDS BY RX/DR IN RCRD: CPT | Performed by: NURSE PRACTITIONER

## 2023-07-31 PROCEDURE — 99214 OFFICE O/P EST MOD 30 MIN: CPT | Performed by: NURSE PRACTITIONER

## 2023-07-31 PROCEDURE — G0439 PPPS, SUBSEQ VISIT: HCPCS | Performed by: NURSE PRACTITIONER

## 2023-07-31 RX ORDER — LISINOPRIL 40 MG/1
1 TABLET ORAL DAILY
COMMUNITY
Start: 2023-07-03

## 2023-07-31 RX ORDER — BUSPIRONE HYDROCHLORIDE 10 MG/1
10 TABLET ORAL 3 TIMES DAILY PRN
Qty: 270 TABLET | Refills: 3 | Status: SHIPPED | OUTPATIENT
Start: 2023-07-31

## 2023-07-31 RX ORDER — FLUOXETINE HYDROCHLORIDE 40 MG/1
40 CAPSULE ORAL DAILY
Qty: 90 CAPSULE | Refills: 3 | Status: SHIPPED | OUTPATIENT
Start: 2023-07-31

## 2023-08-01 LAB
ALBUMIN UR-MCNC: 112.6 MG/DL
HBA1C MFR BLD: 11.6 % (ref 4.8–5.6)
TSH SERPL DL<=0.05 MIU/L-ACNC: 1.62 UIU/ML (ref 0.27–4.2)

## 2023-08-08 LAB — DRUGS UR: NORMAL

## 2023-08-09 DIAGNOSIS — F51.01 PRIMARY INSOMNIA: ICD-10-CM

## 2023-08-09 DIAGNOSIS — E11.42 DIABETIC PERIPHERAL NEUROPATHY ASSOCIATED WITH TYPE 2 DIABETES MELLITUS: Chronic | ICD-10-CM

## 2023-08-09 DIAGNOSIS — Z79.4 TYPE 2 DIABETES MELLITUS WITH HYPERGLYCEMIA, WITH LONG-TERM CURRENT USE OF INSULIN: ICD-10-CM

## 2023-08-09 DIAGNOSIS — E11.65 TYPE 2 DIABETES MELLITUS WITH HYPERGLYCEMIA, WITH LONG-TERM CURRENT USE OF INSULIN: ICD-10-CM

## 2023-08-09 RX ORDER — GABAPENTIN 600 MG/1
600 TABLET ORAL 3 TIMES DAILY
Qty: 90 TABLET | Refills: 0 | Status: SHIPPED | OUTPATIENT
Start: 2023-08-09

## 2023-08-09 RX ORDER — TEMAZEPAM 30 MG/1
30 CAPSULE ORAL NIGHTLY PRN
Qty: 30 CAPSULE | Refills: 0 | Status: SHIPPED | OUTPATIENT
Start: 2023-08-09

## 2023-09-03 ENCOUNTER — HOSPITAL ENCOUNTER (EMERGENCY)
Facility: HOSPITAL | Age: 43
Discharge: PSYCHIATRIC OR SUBSTANCE USE FACILITY | DRG: 885 | End: 2023-09-03
Attending: EMERGENCY MEDICINE | Admitting: STUDENT IN AN ORGANIZED HEALTH CARE EDUCATION/TRAINING PROGRAM
Payer: MEDICARE

## 2023-09-03 ENCOUNTER — HOSPITAL ENCOUNTER (INPATIENT)
Facility: HOSPITAL | Age: 43
LOS: 8 days | Discharge: HOME | DRG: 885 | End: 2023-09-11
Attending: PSYCHIATRY & NEUROLOGY | Admitting: PSYCHIATRY & NEUROLOGY
Payer: MEDICARE

## 2023-09-03 VITALS
BODY MASS INDEX: 44.1 KG/M2 | HEIGHT: 71 IN | HEART RATE: 99 BPM | WEIGHT: 315 LBS | SYSTOLIC BLOOD PRESSURE: 147 MMHG | DIASTOLIC BLOOD PRESSURE: 98 MMHG | RESPIRATION RATE: 20 BRPM | TEMPERATURE: 98.2 F | OXYGEN SATURATION: 96 %

## 2023-09-03 DIAGNOSIS — F32.A DEPRESSION, UNSPECIFIED DEPRESSION TYPE: Primary | ICD-10-CM

## 2023-09-03 PROBLEM — F32.9 MAJOR DEPRESSION: Status: ACTIVE | Noted: 2023-09-03

## 2023-09-03 LAB
ALBUMIN SERPL-MCNC: 3.5 G/DL (ref 3.5–5.2)
ALBUMIN/GLOB SERPL: 0.9 G/DL
ALP SERPL-CCNC: 66 U/L (ref 39–117)
ALT SERPL W P-5'-P-CCNC: 31 U/L (ref 1–41)
AMPHET+METHAMPHET UR QL: NEGATIVE
AMPHETAMINES UR QL: NEGATIVE
ANION GAP SERPL CALCULATED.3IONS-SCNC: 10 MMOL/L (ref 5–15)
APAP SERPL-MCNC: <5 MCG/ML (ref 0–30)
AST SERPL-CCNC: 23 U/L (ref 1–40)
BACTERIA UR QL AUTO: NORMAL /HPF
BARBITURATES UR QL SCN: NEGATIVE
BASOPHILS # BLD AUTO: 0.03 10*3/MM3 (ref 0–0.2)
BASOPHILS NFR BLD AUTO: 0.4 % (ref 0–1.5)
BENZODIAZ UR QL SCN: POSITIVE
BILIRUB SERPL-MCNC: 0.4 MG/DL (ref 0–1.2)
BILIRUB UR QL STRIP: NEGATIVE
BUN SERPL-MCNC: 12 MG/DL (ref 6–20)
BUN/CREAT SERPL: 17.9 (ref 7–25)
BUPRENORPHINE SERPL-MCNC: NEGATIVE NG/ML
CALCIUM SPEC-SCNC: 9.6 MG/DL (ref 8.6–10.5)
CANNABINOIDS SERPL QL: NEGATIVE
CHLORIDE SERPL-SCNC: 99 MMOL/L (ref 98–107)
CLARITY UR: CLEAR
CO2 SERPL-SCNC: 26 MMOL/L (ref 22–29)
COCAINE UR QL: NEGATIVE
COLOR UR: YELLOW
CREAT SERPL-MCNC: 0.67 MG/DL (ref 0.76–1.27)
DEPRECATED RDW RBC AUTO: 41 FL (ref 37–54)
EGFRCR SERPLBLD CKD-EPI 2021: 118.1 ML/MIN/1.73
EOSINOPHIL # BLD AUTO: 0.29 10*3/MM3 (ref 0–0.4)
EOSINOPHIL NFR BLD AUTO: 4.1 % (ref 0.3–6.2)
ERYTHROCYTE [DISTWIDTH] IN BLOOD BY AUTOMATED COUNT: 13.3 % (ref 12.3–15.4)
ETHANOL BLD-MCNC: <10 MG/DL (ref 0–10)
ETHANOL UR QL: <0.01 %
FENTANYL UR-MCNC: NEGATIVE NG/ML
FLUAV SUBTYP SPEC NAA+PROBE: NOT DETECTED
FLUBV RNA ISLT QL NAA+PROBE: NOT DETECTED
GLOBULIN UR ELPH-MCNC: 3.7 GM/DL
GLUCOSE BLDC GLUCOMTR-MCNC: 252 MG/DL (ref 70–130)
GLUCOSE BLDC GLUCOMTR-MCNC: 311 MG/DL (ref 70–130)
GLUCOSE BLDC GLUCOMTR-MCNC: 312 MG/DL (ref 70–130)
GLUCOSE SERPL-MCNC: 414 MG/DL (ref 65–99)
GLUCOSE UR STRIP-MCNC: ABNORMAL MG/DL
HCT VFR BLD AUTO: 43.6 % (ref 37.5–51)
HGB BLD-MCNC: 14.7 G/DL (ref 13–17.7)
HGB UR QL STRIP.AUTO: NEGATIVE
HOLD SPECIMEN: NORMAL
HOLD SPECIMEN: NORMAL
HYALINE CASTS UR QL AUTO: NORMAL /LPF
IMM GRANULOCYTES # BLD AUTO: 0.09 10*3/MM3 (ref 0–0.05)
IMM GRANULOCYTES NFR BLD AUTO: 1.3 % (ref 0–0.5)
KETONES UR QL STRIP: NEGATIVE
LEUKOCYTE ESTERASE UR QL STRIP.AUTO: NEGATIVE
LYMPHOCYTES # BLD AUTO: 1.68 10*3/MM3 (ref 0.7–3.1)
LYMPHOCYTES NFR BLD AUTO: 23.8 % (ref 19.6–45.3)
MCH RBC QN AUTO: 28.3 PG (ref 26.6–33)
MCHC RBC AUTO-ENTMCNC: 33.7 G/DL (ref 31.5–35.7)
MCV RBC AUTO: 83.8 FL (ref 79–97)
METHADONE UR QL SCN: NEGATIVE
MONOCYTES # BLD AUTO: 0.56 10*3/MM3 (ref 0.1–0.9)
MONOCYTES NFR BLD AUTO: 7.9 % (ref 5–12)
NEUTROPHILS NFR BLD AUTO: 4.4 10*3/MM3 (ref 1.7–7)
NEUTROPHILS NFR BLD AUTO: 62.5 % (ref 42.7–76)
NITRITE UR QL STRIP: NEGATIVE
NRBC BLD AUTO-RTO: 0 /100 WBC (ref 0–0.2)
OPIATES UR QL: NEGATIVE
OXYCODONE UR QL SCN: NEGATIVE
PCP UR QL SCN: NEGATIVE
PH UR STRIP.AUTO: 7 [PH] (ref 5–9)
PLATELET # BLD AUTO: 196 10*3/MM3 (ref 140–450)
PMV BLD AUTO: 9 FL (ref 6–12)
POTASSIUM SERPL-SCNC: 4.8 MMOL/L (ref 3.5–5.2)
PROPOXYPH UR QL: NEGATIVE
PROT SERPL-MCNC: 7.2 G/DL (ref 6–8.5)
PROT UR QL STRIP: ABNORMAL
RBC # BLD AUTO: 5.2 10*6/MM3 (ref 4.14–5.8)
RBC # UR STRIP: NORMAL /HPF
REF LAB TEST METHOD: NORMAL
SALICYLATES SERPL-MCNC: <0.3 MG/DL
SARS-COV-2 RNA RESP QL NAA+PROBE: NOT DETECTED
SODIUM SERPL-SCNC: 135 MMOL/L (ref 136–145)
SP GR UR STRIP: 1.03 (ref 1–1.03)
SQUAMOUS #/AREA URNS HPF: NORMAL /HPF
TRICYCLICS UR QL SCN: NEGATIVE
UROBILINOGEN UR QL STRIP: ABNORMAL
WBC # UR STRIP: NORMAL /HPF
WBC NRBC COR # BLD: 7.05 10*3/MM3 (ref 3.4–10.8)
WHOLE BLOOD HOLD COAG: NORMAL

## 2023-09-03 PROCEDURE — 80053 COMPREHEN METABOLIC PANEL: CPT | Performed by: STUDENT IN AN ORGANIZED HEALTH CARE EDUCATION/TRAINING PROGRAM

## 2023-09-03 PROCEDURE — 82746 ASSAY OF FOLIC ACID SERUM: CPT | Performed by: PSYCHIATRY & NEUROLOGY

## 2023-09-03 PROCEDURE — 82306 VITAMIN D 25 HYDROXY: CPT | Performed by: PSYCHIATRY & NEUROLOGY

## 2023-09-03 PROCEDURE — 63710000001 INSULIN REGULAR HUMAN PER 5 UNITS

## 2023-09-03 PROCEDURE — 85025 COMPLETE CBC W/AUTO DIFF WBC: CPT | Performed by: STUDENT IN AN ORGANIZED HEALTH CARE EDUCATION/TRAINING PROGRAM

## 2023-09-03 PROCEDURE — 82948 REAGENT STRIP/BLOOD GLUCOSE: CPT

## 2023-09-03 PROCEDURE — 80307 DRUG TEST PRSMV CHEM ANLYZR: CPT | Performed by: STUDENT IN AN ORGANIZED HEALTH CARE EDUCATION/TRAINING PROGRAM

## 2023-09-03 PROCEDURE — 82607 VITAMIN B-12: CPT | Performed by: PSYCHIATRY & NEUROLOGY

## 2023-09-03 PROCEDURE — 80179 DRUG ASSAY SALICYLATE: CPT | Performed by: STUDENT IN AN ORGANIZED HEALTH CARE EDUCATION/TRAINING PROGRAM

## 2023-09-03 PROCEDURE — 80143 DRUG ASSAY ACETAMINOPHEN: CPT | Performed by: STUDENT IN AN ORGANIZED HEALTH CARE EDUCATION/TRAINING PROGRAM

## 2023-09-03 PROCEDURE — 99284 EMERGENCY DEPT VISIT MOD MDM: CPT

## 2023-09-03 PROCEDURE — 63710000001 INSULIN ASPART PER 5 UNITS: Performed by: PSYCHIATRY & NEUROLOGY

## 2023-09-03 PROCEDURE — 36415 COLL VENOUS BLD VENIPUNCTURE: CPT

## 2023-09-03 PROCEDURE — 87636 SARSCOV2 & INF A&B AMP PRB: CPT | Performed by: STUDENT IN AN ORGANIZED HEALTH CARE EDUCATION/TRAINING PROGRAM

## 2023-09-03 PROCEDURE — 81001 URINALYSIS AUTO W/SCOPE: CPT | Performed by: STUDENT IN AN ORGANIZED HEALTH CARE EDUCATION/TRAINING PROGRAM

## 2023-09-03 PROCEDURE — 82077 ASSAY SPEC XCP UR&BREATH IA: CPT | Performed by: STUDENT IN AN ORGANIZED HEALTH CARE EDUCATION/TRAINING PROGRAM

## 2023-09-03 RX ORDER — LOPERAMIDE HYDROCHLORIDE 2 MG/1
2 CAPSULE ORAL
Status: DISCONTINUED | OUTPATIENT
Start: 2023-09-03 | End: 2023-09-11 | Stop reason: HOSPADM

## 2023-09-03 RX ORDER — DEXTROSE MONOHYDRATE 25 G/50ML
25 INJECTION, SOLUTION INTRAVENOUS
Status: DISCONTINUED | OUTPATIENT
Start: 2023-09-03 | End: 2023-09-11 | Stop reason: HOSPADM

## 2023-09-03 RX ORDER — LORAZEPAM 2 MG/1
2 TABLET ORAL ONCE
Status: COMPLETED | OUTPATIENT
Start: 2023-09-03 | End: 2023-09-03

## 2023-09-03 RX ORDER — INSULIN ASPART 100 [IU]/ML
0-14 INJECTION, SOLUTION INTRAVENOUS; SUBCUTANEOUS
Status: DISCONTINUED | OUTPATIENT
Start: 2023-09-03 | End: 2023-09-04

## 2023-09-03 RX ORDER — TRAZODONE HYDROCHLORIDE 50 MG/1
50 TABLET ORAL NIGHTLY PRN
Status: DISCONTINUED | OUTPATIENT
Start: 2023-09-03 | End: 2023-09-04

## 2023-09-03 RX ORDER — GLUCAGON 1 MG/ML
1 KIT INJECTION
Status: DISCONTINUED | OUTPATIENT
Start: 2023-09-03 | End: 2023-09-11 | Stop reason: HOSPADM

## 2023-09-03 RX ORDER — ALUMINA, MAGNESIA, AND SIMETHICONE 2400; 2400; 240 MG/30ML; MG/30ML; MG/30ML
15 SUSPENSION ORAL EVERY 6 HOURS PRN
Status: DISCONTINUED | OUTPATIENT
Start: 2023-09-03 | End: 2023-09-11 | Stop reason: HOSPADM

## 2023-09-03 RX ORDER — CLONIDINE HYDROCHLORIDE 0.1 MG/1
0.1 TABLET ORAL EVERY 4 HOURS PRN
Status: DISCONTINUED | OUTPATIENT
Start: 2023-09-03 | End: 2023-09-11 | Stop reason: HOSPADM

## 2023-09-03 RX ORDER — ACETAMINOPHEN 325 MG/1
650 TABLET ORAL EVERY 4 HOURS PRN
Status: DISCONTINUED | OUTPATIENT
Start: 2023-09-03 | End: 2023-09-11 | Stop reason: HOSPADM

## 2023-09-03 RX ORDER — HYDROXYZINE PAMOATE 50 MG/1
50 CAPSULE ORAL EVERY 6 HOURS PRN
Status: DISCONTINUED | OUTPATIENT
Start: 2023-09-03 | End: 2023-09-11 | Stop reason: HOSPADM

## 2023-09-03 RX ORDER — NICOTINE POLACRILEX 4 MG
15 LOZENGE BUCCAL
Status: DISCONTINUED | OUTPATIENT
Start: 2023-09-03 | End: 2023-09-04

## 2023-09-03 RX ORDER — ONDANSETRON 4 MG/1
4 TABLET, ORALLY DISINTEGRATING ORAL EVERY 6 HOURS PRN
Status: DISCONTINUED | OUTPATIENT
Start: 2023-09-03 | End: 2023-09-11 | Stop reason: HOSPADM

## 2023-09-03 RX ADMIN — HYDROXYZINE PAMOATE 50 MG: 50 CAPSULE ORAL at 21:46

## 2023-09-03 RX ADMIN — CLONIDINE HYDROCHLORIDE 0.1 MG: 0.1 TABLET ORAL at 18:37

## 2023-09-03 RX ADMIN — TRAZODONE HYDROCHLORIDE 50 MG: 50 TABLET ORAL at 21:46

## 2023-09-03 RX ADMIN — LORAZEPAM 2 MG: 2 TABLET ORAL at 20:22

## 2023-09-03 RX ADMIN — HUMAN INSULIN 12 UNITS: 100 INJECTION, SOLUTION SUBCUTANEOUS at 15:05

## 2023-09-03 RX ADMIN — INSULIN ASPART 10 UNITS: 100 INJECTION, SOLUTION INTRAVENOUS; SUBCUTANEOUS at 19:44

## 2023-09-03 NOTE — NURSING NOTE
Patient unsure of current medications. Karissa VEGA attempted to call patient's pharmacy. However, pharmacy is currently closed.
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
## 2023-09-03 NOTE — NURSING NOTE
Patient arrived to [facility] from ED at 1733. Patient escorted by ED staff and security. Patient wanded at door and no contraband found. Patient signed admission paperwork. Patient oriented to unit. Food and drink provided.       Patient wears CPAP at night

## 2023-09-03 NOTE — ED PROVIDER NOTES
"Subjective   History of Present Illness  42 year old male patient presents to ER for complaint of worsening depression recently and social isolation. He denies suicidal ideation. He reports that his mother passed away a year ago and his father recently remarried and moved, leaving him alone in the home that his mother  in. He reports sleep disturbance, that he can see his mothers face when he closes his eyes, but he is sleeping in the room she  in. He denies recent changes in his medications, has an appointment in October with Kristin. His meds are presently prescribed by his PCP. He denies tobacco, ETOH, or illicit drug use. He has a past history of suicide attempt by hanging \"years ago.\"     Review of Systems   Constitutional: Negative.    HENT: Negative.     Eyes: Negative.    Respiratory: Negative.     Cardiovascular: Negative.    Gastrointestinal: Negative.    Endocrine: Negative.    Genitourinary: Negative.    Musculoskeletal: Negative.    Skin: Negative.    Allergic/Immunologic: Negative.    Neurological: Negative.    Hematological: Negative.    Psychiatric/Behavioral:  Positive for sleep disturbance. Negative for suicidal ideas.      Past Medical History:   Diagnosis Date    Asthma     CAD (coronary artery disease)     Chronic back pain     Chronic pulmonary embolism     Coronary artery disease     Diabetes mellitus     Factor II deficiency     Fatty liver     GERD (gastroesophageal reflux disease)     Hyperlipidemia     Hypertension     Morbid obesity     RLS (restless legs syndrome)     Seizures     Sleep apnea        Allergies   Allergen Reactions    Glipizide Other (See Comments) and Unknown (See Comments)     Slurred Speech  Slurred Speech  Hallucinations, Slurred Speech    Reglan [Metoclopramide] Anxiety    Tramadol Other (See Comments)     seizures    Risperidone Other (See Comments)     Slurred speech  Can't stand, trouble breathing, slurred speech         Past Surgical History: " "  Procedure Laterality Date    CARDIAC CATHETERIZATION N/A 3/15/2017    CARDIAC CATHETERIZATION N/A 3/15/2017    CARDIAC CATHETERIZATION N/A 3/1/2018    CARDIAC CATHETERIZATION N/A 2020    CHOLECYSTECTOMY      CORONARY ANGIOPLASTY WITH STENT PLACEMENT      MD RT/LT HEART CATHETERS N/A 3/15/2017    TRANSESOPHAGEAL ECHOCARDIOGRAM ([facility])         Family History   Problem Relation Age of Onset    Heart disease Mother     Obesity Mother     Sleep apnea Father     Cancer Paternal Grandfather        Social History     Socioeconomic History    Marital status: Single     Spouse name: Cori    Number of children: 0   Tobacco Use    Smoking status: Former     Packs/day: 0.25     Years: 0.50     Pack years: 0.13     Types: Cigarettes     Quit date:      Years since quittin.6    Smokeless tobacco: Current     Types: Snuff   Vaping Use    Vaping Use: Never used   Substance and Sexual Activity    Alcohol use: No    Drug use: No    Sexual activity: Not Currently           Objective   /98 (BP Location: Left arm, Patient Position: Sitting)   Pulse 99   Temp 98.2 °F (36.8 °C) (Oral)   Resp 20   Ht 180.3 cm (71\")   Wt (!) 204 kg (450 lb)   SpO2 96%   BMI 62.76 kg/m²     Physical Exam  Vitals and nursing note reviewed.   Constitutional:       General: He is not in acute distress.     Appearance: Normal appearance. He is not ill-appearing, toxic-appearing or diaphoretic.   HENT:      Head: Normocephalic.      Nose: Nose normal.      Mouth/Throat:      Mouth: Mucous membranes are moist.   Eyes:      Pupils: Pupils are equal, round, and reactive to light.   Cardiovascular:      Rate and Rhythm: Normal rate and regular rhythm.      Pulses: Normal pulses.      Heart sounds: Normal heart sounds.   Pulmonary:      Effort: Pulmonary effort is normal. No respiratory distress.      Breath sounds: Normal breath sounds. No wheezing.   Abdominal:      General: Bowel sounds are normal. There is no distension.      Palpations: " Abdomen is soft.      Tenderness: There is no abdominal tenderness.   Musculoskeletal:         General: Normal range of motion.      Cervical back: Normal range of motion.   Skin:     General: Skin is warm and dry.      Capillary Refill: Capillary refill takes less than 2 seconds.   Neurological:      Mental Status: He is alert and oriented to person, place, and time.   Psychiatric:         Attention and Perception: Attention normal.         Mood and Affect: Mood normal. Mood is depressed.         Speech: Speech normal.         Behavior: Behavior normal. Behavior is withdrawn. Behavior is not agitated, aggressive or hyperactive.         Thought Content: Thought content normal. Thought content does not include homicidal or suicidal ideation.         Judgment: Judgment normal.       Procedures           ED Course           Results for orders placed or performed during the hospital encounter of 09/03/23   COVID-19 and FLU A/B PCR - Swab, Nasopharynx    Specimen: Nasopharynx; Swab   Result Value Ref Range    COVID19 Not Detected Not Detected - Ref. Range    Influenza A PCR Not Detected Not Detected    Influenza B PCR Not Detected Not Detected   Comprehensive Metabolic Panel    Specimen: Blood   Result Value Ref Range    Glucose 414 (C) 65 - 99 mg/dL    BUN 12 6 - 20 mg/dL    Creatinine 0.67 (L) 0.76 - 1.27 mg/dL    Sodium 135 (L) 136 - 145 mmol/L    Potassium 4.8 3.5 - 5.2 mmol/L    Chloride 99 98 - 107 mmol/L    CO2 26.0 22.0 - 29.0 mmol/L    Calcium 9.6 8.6 - 10.5 mg/dL    Total Protein 7.2 6.0 - 8.5 g/dL    Albumin 3.5 3.5 - 5.2 g/dL    ALT (SGPT) 31 1 - 41 U/L    AST (SGOT) 23 1 - 40 U/L    Alkaline Phosphatase 66 39 - 117 U/L    Total Bilirubin 0.4 0.0 - 1.2 mg/dL    Globulin 3.7 gm/dL    A/G Ratio 0.9 g/dL    BUN/Creatinine Ratio 17.9 7.0 - 25.0    Anion Gap 10.0 5.0 - 15.0 mmol/L    eGFR 118.1 >60.0 mL/min/1.73   Acetaminophen Level    Specimen: Blood   Result Value Ref Range    Acetaminophen <5.0 0.0 - 30.0  mcg/mL   Ethanol    Specimen: Blood   Result Value Ref Range    Ethanol <10 0 - 10 mg/dL    Ethanol % <0.010 %   Salicylate Level    Specimen: Blood   Result Value Ref Range    Salicylate <0.3 <=30.0 mg/dL   Urine Drug Screen - Urine, Clean Catch    Specimen: Urine, Clean Catch   Result Value Ref Range    THC, Screen, Urine Negative Negative    Phencyclidine (PCP), Urine Negative Negative    Cocaine Screen, Urine Negative Negative    Methamphetamine, Ur Negative Negative    Opiate Screen Negative Negative    Amphetamine Screen, Urine Negative Negative    Benzodiazepine Screen, Urine Positive (A) Negative    Tricyclic Antidepressants Screen Negative Negative    Methadone Screen, Urine Negative Negative    Barbiturates Screen, Urine Negative Negative    Oxycodone Screen, Urine Negative Negative    Propoxyphene Screen Negative Negative    Buprenorphine, Screen, Urine Negative Negative   Urinalysis With Microscopic If Indicated (No Culture) - Urine, Clean Catch    Specimen: Urine, Clean Catch   Result Value Ref Range    Color, UA Yellow Yellow, Straw, Dark Yellow, Suni    Appearance, UA Clear Clear    pH, UA 7.0 5.0 - 9.0    Specific Gravity, UA 1.028 1.003 - 1.030    Glucose, UA >=1000 mg/dL (3+) (A) Negative    Ketones, UA Negative Negative    Bilirubin, UA Negative Negative    Blood, UA Negative Negative    Protein,  mg/dL (2+) (A) Negative    Leuk Esterase, UA Negative Negative    Nitrite, UA Negative Negative    Urobilinogen, UA 1.0 E.U./dL 0.2 - 1.0 E.U./dL   CBC Auto Differential    Specimen: Blood   Result Value Ref Range    WBC 7.05 3.40 - 10.80 10*3/mm3    RBC 5.20 4.14 - 5.80 10*6/mm3    Hemoglobin 14.7 13.0 - 17.7 g/dL    Hematocrit 43.6 37.5 - 51.0 %    MCV 83.8 79.0 - 97.0 fL    MCH 28.3 26.6 - 33.0 pg    MCHC 33.7 31.5 - 35.7 g/dL    RDW 13.3 12.3 - 15.4 %    RDW-SD 41.0 37.0 - 54.0 fl    MPV 9.0 6.0 - 12.0 fL    Platelets 196 140 - 450 10*3/mm3    Neutrophil % 62.5 42.7 - 76.0 %    Lymphocyte %  23.8 19.6 - 45.3 %    Monocyte % 7.9 5.0 - 12.0 %    Eosinophil % 4.1 0.3 - 6.2 %    Basophil % 0.4 0.0 - 1.5 %    Immature Grans % 1.3 (H) 0.0 - 0.5 %    Neutrophils, Absolute 4.40 1.70 - 7.00 10*3/mm3    Lymphocytes, Absolute 1.68 0.70 - 3.10 10*3/mm3    Monocytes, Absolute 0.56 0.10 - 0.90 10*3/mm3    Eosinophils, Absolute 0.29 0.00 - 0.40 10*3/mm3    Basophils, Absolute 0.03 0.00 - 0.20 10*3/mm3    Immature Grans, Absolute 0.09 (H) 0.00 - 0.05 10*3/mm3    nRBC 0.0 0.0 - 0.2 /100 WBC   Fentanyl, Urine - Urine, Clean Catch    Specimen: Urine, Clean Catch   Result Value Ref Range    Fentanyl, Urine Negative Negative   Urinalysis, Microscopic Only - Urine, Clean Catch    Specimen: Urine, Clean Catch   Result Value Ref Range    RBC, UA None Seen None Seen /HPF    WBC, UA 0-2 None Seen, 0-2, 3-5 /HPF    Bacteria, UA None Seen None Seen /HPF    Squamous Epithelial Cells, UA None Seen None Seen, 0-2 /HPF    Hyaline Casts, UA None Seen None Seen /LPF    Methodology Automated Microscopy    POC Glucose Once    Specimen: Blood   Result Value Ref Range    Glucose 312 (H) 70 - 130 mg/dL   POC Glucose Once    Specimen: Blood   Result Value Ref Range    Glucose 252 (H) 70 - 130 mg/dL   Green Top (Gel)   Result Value Ref Range    Extra Tube Hold for add-ons.    Gold Top - SST   Result Value Ref Range    Extra Tube Hold for add-ons.    Light Blue Top   Result Value Ref Range    Extra Tube Hold for add-ons.                                        Medical Decision Making  Amount and/or Complexity of Data Reviewed  Labs: ordered.    Risk  OTC drugs.        Final diagnoses:   Depression, unspecified depression type       ED Disposition  ED Disposition       ED Disposition   DC/Transfer to Behavioral Health Condition   Stable    Comment   --               No follow-up provider specified.       Medication List      No changes were made to your prescriptions during this visit.            Swetha Butt, APRN  09/03/23 [id number]

## 2023-09-03 NOTE — NURSING NOTE
Patient states that he is unsure if his PTA med list is correct. Patient states that he has not taken any insulin in the past two months. Attempted to call [facility] pharmacy to verify medications, they were closed. PTA med list not complete at this time.

## 2023-09-04 PROBLEM — F43.10 POST TRAUMATIC STRESS DISORDER (PTSD): Status: ACTIVE | Noted: 2023-09-04

## 2023-09-04 LAB
25(OH)D3 SERPL-MCNC: 15.3 NG/ML (ref 30–100)
FOLATE SERPL-MCNC: >20 NG/ML (ref 4.78–24.2)
GLUCOSE BLDC GLUCOMTR-MCNC: 179 MG/DL (ref 70–130)
GLUCOSE BLDC GLUCOMTR-MCNC: 283 MG/DL (ref 70–130)
GLUCOSE BLDC GLUCOMTR-MCNC: 327 MG/DL (ref 70–130)
GLUCOSE BLDC GLUCOMTR-MCNC: 359 MG/DL (ref 70–130)
GLUCOSE P FAST SERPL-MCNC: 209 MG/DL (ref 74–106)
VIT B12 BLD-MCNC: 292 PG/ML (ref 211–946)

## 2023-09-04 PROCEDURE — 63710000001 INSULIN DETEMIR PER 5 UNITS: Performed by: NURSE PRACTITIONER

## 2023-09-04 PROCEDURE — 82948 REAGENT STRIP/BLOOD GLUCOSE: CPT

## 2023-09-04 PROCEDURE — 63710000001 INSULIN ASPART PER 5 UNITS: Performed by: PSYCHIATRY & NEUROLOGY

## 2023-09-04 PROCEDURE — 63710000001 INSULIN ASPART PER 5 UNITS: Performed by: NURSE PRACTITIONER

## 2023-09-04 PROCEDURE — 82947 ASSAY GLUCOSE BLOOD QUANT: CPT | Performed by: PSYCHIATRY & NEUROLOGY

## 2023-09-04 RX ORDER — BUSPIRONE HYDROCHLORIDE 10 MG/1
10 TABLET ORAL 3 TIMES DAILY
Status: DISCONTINUED | OUTPATIENT
Start: 2023-09-04 | End: 2023-09-11 | Stop reason: HOSPADM

## 2023-09-04 RX ORDER — ESCITALOPRAM OXALATE 5 MG/1
5 TABLET ORAL DAILY
Status: COMPLETED | OUTPATIENT
Start: 2023-09-04 | End: 2023-09-04

## 2023-09-04 RX ORDER — BUSPIRONE HYDROCHLORIDE 10 MG/1
10 TABLET ORAL 3 TIMES DAILY PRN
Status: DISCONTINUED | OUTPATIENT
Start: 2023-09-04 | End: 2023-09-04

## 2023-09-04 RX ORDER — ROSUVASTATIN CALCIUM 10 MG/1
40 TABLET, COATED ORAL DAILY
Status: DISCONTINUED | OUTPATIENT
Start: 2023-09-04 | End: 2023-09-11 | Stop reason: HOSPADM

## 2023-09-04 RX ORDER — PRAZOSIN HYDROCHLORIDE 1 MG/1
1 CAPSULE ORAL 2 TIMES DAILY
Status: DISCONTINUED | OUTPATIENT
Start: 2023-09-04 | End: 2023-09-11 | Stop reason: HOSPADM

## 2023-09-04 RX ORDER — NICOTINE POLACRILEX 4 MG
15 LOZENGE BUCCAL
Status: DISCONTINUED | OUTPATIENT
Start: 2023-09-04 | End: 2023-09-11 | Stop reason: HOSPADM

## 2023-09-04 RX ORDER — INSULIN ASPART 100 [IU]/ML
0-24 INJECTION, SOLUTION INTRAVENOUS; SUBCUTANEOUS
Status: DISCONTINUED | OUTPATIENT
Start: 2023-09-04 | End: 2023-09-11 | Stop reason: HOSPADM

## 2023-09-04 RX ORDER — GABAPENTIN 300 MG/1
600 CAPSULE ORAL EVERY 12 HOURS SCHEDULED
Status: DISCONTINUED | OUTPATIENT
Start: 2023-09-04 | End: 2023-09-04

## 2023-09-04 RX ORDER — ALBUTEROL SULFATE 2.5 MG/3ML
2.5 SOLUTION RESPIRATORY (INHALATION) EVERY 6 HOURS PRN
Status: DISCONTINUED | OUTPATIENT
Start: 2023-09-04 | End: 2023-09-11 | Stop reason: HOSPADM

## 2023-09-04 RX ORDER — PANTOPRAZOLE SODIUM 40 MG/1
40 TABLET, DELAYED RELEASE ORAL NIGHTLY
Status: DISCONTINUED | OUTPATIENT
Start: 2023-09-04 | End: 2023-09-11 | Stop reason: HOSPADM

## 2023-09-04 RX ORDER — TRAZODONE HYDROCHLORIDE 150 MG/1
150 TABLET ORAL NIGHTLY
Status: DISCONTINUED | OUTPATIENT
Start: 2023-09-04 | End: 2023-09-11 | Stop reason: HOSPADM

## 2023-09-04 RX ORDER — ROPINIROLE 0.5 MG/1
0.5 TABLET, FILM COATED ORAL ONCE
Status: COMPLETED | OUTPATIENT
Start: 2023-09-04 | End: 2023-09-04

## 2023-09-04 RX ORDER — PRAMIPEXOLE DIHYDROCHLORIDE 1 MG/1
2 TABLET ORAL NIGHTLY
Status: DISCONTINUED | OUTPATIENT
Start: 2023-09-04 | End: 2023-09-11 | Stop reason: HOSPADM

## 2023-09-04 RX ORDER — LISINOPRIL 20 MG/1
40 TABLET ORAL DAILY
Status: DISCONTINUED | OUTPATIENT
Start: 2023-09-04 | End: 2023-09-11 | Stop reason: HOSPADM

## 2023-09-04 RX ORDER — FENOFIBRATE 145 MG/1
145 TABLET, COATED ORAL DAILY
Status: DISCONTINUED | OUTPATIENT
Start: 2023-09-04 | End: 2023-09-11 | Stop reason: HOSPADM

## 2023-09-04 RX ORDER — TRAZODONE HYDROCHLORIDE 150 MG/1
150 TABLET ORAL NIGHTLY PRN
Status: DISCONTINUED | OUTPATIENT
Start: 2023-09-04 | End: 2023-09-11 | Stop reason: HOSPADM

## 2023-09-04 RX ORDER — GABAPENTIN 400 MG/1
400 CAPSULE ORAL ONCE
Status: COMPLETED | OUTPATIENT
Start: 2023-09-04 | End: 2023-09-04

## 2023-09-04 RX ORDER — DILTIAZEM HYDROCHLORIDE 120 MG/1
120 CAPSULE, COATED, EXTENDED RELEASE ORAL DAILY
Status: DISCONTINUED | OUTPATIENT
Start: 2023-09-04 | End: 2023-09-11 | Stop reason: HOSPADM

## 2023-09-04 RX ORDER — METOPROLOL SUCCINATE 50 MG/1
50 TABLET, EXTENDED RELEASE ORAL DAILY
Status: DISCONTINUED | OUTPATIENT
Start: 2023-09-04 | End: 2023-09-11 | Stop reason: HOSPADM

## 2023-09-04 RX ORDER — GABAPENTIN 300 MG/1
600 CAPSULE ORAL 3 TIMES DAILY
Status: DISCONTINUED | OUTPATIENT
Start: 2023-09-04 | End: 2023-09-11 | Stop reason: HOSPADM

## 2023-09-04 RX ORDER — DEXTROSE MONOHYDRATE 25 G/50ML
25 INJECTION, SOLUTION INTRAVENOUS
Status: DISCONTINUED | OUTPATIENT
Start: 2023-09-04 | End: 2023-09-04

## 2023-09-04 RX ORDER — ESCITALOPRAM OXALATE 10 MG/1
10 TABLET ORAL DAILY
Status: DISCONTINUED | OUTPATIENT
Start: 2023-09-05 | End: 2023-09-07

## 2023-09-04 RX ADMIN — HYDROXYZINE PAMOATE 50 MG: 50 CAPSULE ORAL at 19:08

## 2023-09-04 RX ADMIN — GABAPENTIN 400 MG: 400 CAPSULE ORAL at 00:24

## 2023-09-04 RX ADMIN — GABAPENTIN 600 MG: 300 CAPSULE ORAL at 10:46

## 2023-09-04 RX ADMIN — ROPINIROLE HYDROCHLORIDE 0.5 MG: 0.5 TABLET, FILM COATED ORAL at 00:23

## 2023-09-04 RX ADMIN — GABAPENTIN 600 MG: 300 CAPSULE ORAL at 17:00

## 2023-09-04 RX ADMIN — FENOFIBRATE 145 MG: 145 TABLET ORAL at 10:47

## 2023-09-04 RX ADMIN — ACETAMINOPHEN 650 MG: 325 TABLET, FILM COATED ORAL at 00:24

## 2023-09-04 RX ADMIN — INSULIN ASPART 3 UNITS: 100 INJECTION, SOLUTION INTRAVENOUS; SUBCUTANEOUS at 08:03

## 2023-09-04 RX ADMIN — LISINOPRIL 40 MG: 20 TABLET ORAL at 10:46

## 2023-09-04 RX ADMIN — TICAGRELOR 90 MG: 90 TABLET ORAL at 20:38

## 2023-09-04 RX ADMIN — PANTOPRAZOLE SODIUM 40 MG: 40 TABLET, DELAYED RELEASE ORAL at 20:38

## 2023-09-04 RX ADMIN — INSULIN ASPART 20 UNITS: 100 INJECTION, SOLUTION INTRAVENOUS; SUBCUTANEOUS at 17:23

## 2023-09-04 RX ADMIN — BUSPIRONE HYDROCHLORIDE 10 MG: 10 TABLET ORAL at 17:00

## 2023-09-04 RX ADMIN — ESCITALOPRAM 5 MG: 5 TABLET, FILM COATED ORAL at 13:06

## 2023-09-04 RX ADMIN — DILTIAZEM HYDROCHLORIDE 120 MG: 120 CAPSULE, COATED, EXTENDED RELEASE ORAL at 10:47

## 2023-09-04 RX ADMIN — BUSPIRONE HYDROCHLORIDE 10 MG: 10 TABLET ORAL at 20:37

## 2023-09-04 RX ADMIN — TICAGRELOR 90 MG: 90 TABLET ORAL at 10:47

## 2023-09-04 RX ADMIN — INSULIN DETEMIR 20 UNITS: 100 INJECTION, SOLUTION SUBCUTANEOUS at 20:37

## 2023-09-04 RX ADMIN — ROSUVASTATIN CALCIUM 40 MG: 10 TABLET, FILM COATED ORAL at 13:06

## 2023-09-04 RX ADMIN — RIVAROXABAN 20 MG: 10 TABLET, FILM COATED ORAL at 17:22

## 2023-09-04 RX ADMIN — PRAZOSIN HYDROCHLORIDE 1 MG: 1 CAPSULE ORAL at 13:06

## 2023-09-04 RX ADMIN — INSULIN ASPART 16 UNITS: 100 INJECTION, SOLUTION INTRAVENOUS; SUBCUTANEOUS at 11:50

## 2023-09-04 RX ADMIN — CLONIDINE HYDROCHLORIDE 0.1 MG: 0.1 TABLET ORAL at 08:04

## 2023-09-04 RX ADMIN — PRAMIPEXOLE DIHYDROCHLORIDE 2 MG: 1 TABLET ORAL at 20:37

## 2023-09-04 RX ADMIN — PRAZOSIN HYDROCHLORIDE 1 MG: 1 CAPSULE ORAL at 20:38

## 2023-09-04 RX ADMIN — TRAZODONE HYDROCHLORIDE 150 MG: 150 TABLET ORAL at 20:38

## 2023-09-04 RX ADMIN — LINAGLIPTIN 5 MG: 5 TABLET, FILM COATED ORAL at 10:46

## 2023-09-04 RX ADMIN — METOPROLOL SUCCINATE 50 MG: 50 TABLET, EXTENDED RELEASE ORAL at 10:46

## 2023-09-04 RX ADMIN — GABAPENTIN 600 MG: 300 CAPSULE ORAL at 20:38

## 2023-09-04 NOTE — PROGRESS NOTES
Attempted to meet with pt. Pt states he would rather not talk this date. Will attempt again next business day.
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
## 2023-09-04 NOTE — NURSING NOTE
Behavior   Note any precipitants to event or behavior   Describe level and action of any aggressive behavior or speech and associated interventions.     Anxiety:10/10  Depression: 10/10  Pain 0   AVH  0   S/I  0   Plan 0   H/I  0   Plan 0     Affect  sad       Note:Pt was observed crying and asking for medication to stop the voices in his head. States he id very upset since his mothers death. Received Ativan and not effective. Given Vistaril and trazodone for restlessness. Staes he normally takes medication for sleep and restless legs.Denies SI, HI, and endorses auditory hallucinations.      Intervention    PRN medication utilized:  Ativan, Vistaril and Trazodone    Instructed in medication usage and effects  Medications administered as ordered  Encouraged to verbalize needs      Response    Verbalized understanding   Did patient take medications as ordered yes  Did patient interact with assessment?  yes    Plan    Will monitor for safety  Will monitor every 15 minutes as ordered  Will evaluate and promote the plan of care    Last BM:  unknown  (Please chart in I/O as well)

## 2023-09-04 NOTE — PLAN OF CARE
Goal Outcome Evaluation:  Plan of Care Reviewed With: patient  Patient Agreement with Plan of Care: agrees     Progress: no change  Outcome Evaluation: Pt has slept less than 2 hours tonight. He has c/o restlessness tonight. PRNs were not effective.

## 2023-09-04 NOTE — CONSULTS
[facility] Medicine Consult  Inpatient Hospitalist Consult  Consult performed by: Purnima Crockett APRN  Consult ordered by: France Gilbert MD        Date of Admission: 9/3/2023  Date of Consult: 09/04/23    Primary Care Physician: Mami Perez APRN  Referring Physician: Dr. Boone - Dr. Gilbert  Chief Complaint/Reason for Consultation: Medical Co-management    Subjective   History of Present Illness  42-year-old man with multiple medical problems including CAD, diabetes mellitus type 2, chronic pulmonary embolism, factor II deficiency, BATES, GERD, hyperlipidemia, hypertension, RLS, epilepsy, ADOLFO, asthma, chronic back pain who is admitted to the adult behavioral health unit with worsening depression.  The hospitalist service was consulted for medical management.  At the time of my evaluation he denies any current medical complaints. Labs reviewed, unremarkable except for hyperglycemia which is now improved after treatment. Nursing BP is elevated, he has not had his blood pressure medications yet this morning.      Review of Systems   Otherwise complete ROS is negative except as mentioned above.    Past Medical History:   Past Medical History:   Diagnosis Date    Asthma     CAD (coronary artery disease)     Chronic back pain     Chronic pulmonary embolism     Coronary artery disease     Diabetes mellitus     Factor II deficiency     Fatty liver     GERD (gastroesophageal reflux disease)     Hyperlipidemia     Hypertension     Morbid obesity     RLS (restless legs syndrome)     Seizures     Sleep apnea      Past Surgical History:  Past Surgical History:   Procedure Laterality Date    CARDIAC CATHETERIZATION N/A 3/15/2017    CARDIAC CATHETERIZATION N/A 3/15/2017    CARDIAC CATHETERIZATION N/A 3/1/2018    CARDIAC CATHETERIZATION N/A 9/2/2020    CHOLECYSTECTOMY      CORONARY ANGIOPLASTY WITH STENT PLACEMENT      IA RT/LT HEART CATHETERS N/A 3/15/2017     TRANSESOPHAGEAL ECHOCARDIOGRAM ([place])       Social History:  reports that he quit smoking about 26 years ago. His smoking use included cigarettes. He has a 0.13 pack-year smoking history. His smokeless tobacco use includes snuff. He reports that he does not drink alcohol and does not use drugs.    Family History: family history includes Cancer in his paternal grandfather; Heart disease in his mother; Obesity in his mother; Sleep apnea in his father.     Allergies:   Allergies   Allergen Reactions    Glipizide Other (See Comments) and Unknown (See Comments)     Slurred Speech  Slurred Speech  Hallucinations, Slurred Speech    Reglan [Metoclopramide] Anxiety    Tramadol Other (See Comments)     seizures    Risperidone Other (See Comments)     Slurred speech  Can't stand, trouble breathing, slurred speech       Medications: Scheduled Meds:dilTIAZem CD, 120 mg, Oral, Daily  fenofibrate, 145 mg, Oral, Daily  gabapentin, 600 mg, Oral, Q12H  Insulin Aspart, 0-24 Units, Subcutaneous, TID AC  insulin detemir, 20 Units, Subcutaneous, Nightly  linagliptin, 5 mg, Oral, Daily  lisinopril, 40 mg, Oral, Daily  metoprolol succinate XL, 50 mg, Oral, Daily  pantoprazole, 40 mg, Oral, Nightly  rivaroxaban, 20 mg, Oral, Daily With Dinner  ticagrelor, 90 mg, Oral, Q12H      Continuous Infusions:   PRN Meds:.  acetaminophen    albuterol    aluminum-magnesium hydroxide-simethicone    cloNIDine    dextrose    dextrose    glucagon (human recombinant)    hydrOXYzine pamoate    loperamide    magnesium hydroxide    ondansetron ODT    traZODone    Objective   Objective    Physical Exam:   Temp:  [97.2 °F (36.2 °C)-98.2 °F (36.8 °C)] 97.3 °F (36.3 °C)  Heart Rate:  [64-99] 84  Resp:  [18-20] 18  BP: (144-183)/(78-98) 183/86  Constitutional:       General: No acute distress.     Appearance: Well-developed. Not diaphoretic.   HENT:      Head: Normocephalic and atraumatic.   Eyes:      Conjunctivae/sclerae: Conjunctivae normal.   Cardiovascular:       Rate and Rhythm: Normal rate and regular rhythm.   Pulmonary:      Effort: Pulmonary effort is normal. No respiratory distress.      Breath sounds: Normal breath sounds.   Abdominal:      General: Bowel sounds are normal. There is no distension.      Palpations: Abdomen is soft.      Tenderness: There is no abdominal tenderness.   Musculoskeletal:         General: No swelling or deformity.   Skin:     General: Skin is warm and dry.   Neurological:      General: No focal deficit present.      Mental Status: Alert and oriented to person, place, and time.      Comments:   CN I: Sense of smell intact  CN II: Visual fields intact  CN III,IV,VI: Extraocular movements intact  CN V: [place] strength and sensation in all three divisions intact  CN VII: Smile and eyelid closure symmetrical  CN VIII: Hearing intact  CN IX and X: Voice and palate movement intact  CN XI: Shoulder shrug intact  CN XII: Tongue protrusion and movement intact      General: No focal deficit present.      Mental Status: Alert and oriented to person, place, and time.   Psychiatric:         Mood and Affect: Mood normal.         Behavior: Behavior normal.       Results Reviewed:  I have personally reviewed current lab, radiology, and data and agree with results.  Lab Results (last 24 hours)       Procedure Component Value Units Date/Time    Glucose, Fasting [[id number]]  (Abnormal) Collected: 09/04/23 0630    Specimen: Blood Updated: 09/04/23 0656     Glucose, Fasting 209 mg/dL     POC Glucose Once [[id number]]  (Abnormal) Collected: 09/04/23 0555    Specimen: Blood Updated: 09/04/23 0607     Glucose 179 mg/dL      Comment: RN NotifiedOperator: [id number] KALLIE CONNIEMeter ID: [id number]       POC Glucose Once [[id number]]  (Abnormal) Collected: 09/03/23 1926    Specimen: Blood Updated: 09/03/23 1938     Glucose 311 mg/dL      Comment: RN NotifiedOperator: [id number] CHRIS JUSTICEMeter ID: [id number]             Imaging Results (Last 24 Hours)        ** No results found for the last 24 hours. **            Assessment / Plan   Assessment:     Active Hospital Problems    Diagnosis     **Major depression    CAD  DMII with peripheral neuropathy  HTN  HLD  ADOLFO  Chronic PE on xarelto  Chronic back pain       Plan:     1.  Psychiatric management per primary team  2.  Medications reviewed  3. Continue: lisinopril, cardizem, metoprolol, tradjenta, brilinta, gabapentin at reduced dose. Start levemir 20 units SC at bedtime with high dose SSI TID AC        Medical Decision Making  Number and Complexity of problems: multiple moderate      Conditions and Status:        Condition is unchanged.     MDM Data  External documents reviewed: N/A  My EKG interpretation: N/A  My films/imaging interpretation: N/A  Tests considered but not ordered: N/A     Decision rules/scores evaluated (example [id number], Wells, etc): N/A     Treatment Plan  As above    Care Planning  Shared decision making: Patient, nursing staff, attending  Code status and discussions: Full code/CPR    Disposition  Social Determinants of Health that impact treatment or disposition: n/a  I expect the patient to be discharged per psychiatry recommendations    I confirmed that the patient's Advance Care Plan is present, code status is documented, or surrogate decision maker is listed in the patient's medical record.      I have utilized all available immediate resources to obtain, update, or review the patient's current medications.     I discussed the patient's findings and my recommendations with *patient, nursing staff, attending    I confirmed that the patient's Advance Care Plan is present, code status is documented, or surrogate decision maker is listed in the patient's medical record.     Patient seen and examined by me on 09/04/23 at 0830.    Copied text in this note has been reviewed and is accurate as of 9/4/2023.    Electronically signed by LALA Ceballos, 09/04/23, 10:30 CDT.

## 2023-09-04 NOTE — NURSING NOTE
"When staff went to check on pt. He was sitting on the floor by air unit. Pt was very tearful. Pt stated \"I just feel like beating my head against the wall. I just want to stop thinking. Every time I close my eyes I see my dead moms face. I can't stop seeing it. I even see her when my eyes are open. I can't move on. It seems like every one else has moved on and forgot about her, I just can't.\" MD notified and new orders received.   "

## 2023-09-04 NOTE — NURSING NOTE
"Pt complains of restless legs. Pt cannot sleep, constantly forcefully kicking bed. Pt states he has pain of 6/10 and \"it feels like I have worms crawling in my legs and back.\" MD notified and new orders received.   "

## 2023-09-04 NOTE — PLAN OF CARE
Goal Outcome Evaluation:  Plan of Care Reviewed With: patient  Patient Agreement with Plan of Care: agrees     Progress: no change  Outcome Evaluation: Pt has been alert et cooperative with no complaints voiced. He was noted to have increased BP this am with prn clonodine given. PRN ineffective. APRN notified with orders to restart home meds. Bp retaken after et noted to be 145/69. He denies HI, SI et hallucinations.

## 2023-09-04 NOTE — NURSING NOTE
"Behavior   Note any precipitants to event or behavior   Describe level and action of any aggressive behavior or speech and associated interventions.     Anxiety: Excess Worry  Depression: depressed mood, fatigue, feelings of worthlessness/guilt, and hopelessness  Pain  0  AVH   no  S/I   no  Plan  no  H/I   no  Plan  no    Affect   flat      Note: Pt noted to be alert with flat affect et did not elaborate on answers. He was noted to say yes or no to all questions even those that warranted an explanation. Pt continues to c/o anxiety et depression due to the passing of his mother last year. He said that he's \"always\" been depressed et feels that her passing caused it to get worse. He denies HI, SI et hallucinations. PRN clonodine given due to BP being 179/89. BP retaken 1 hr after et noted to be 183/86. Hospitalist et Aprn notified.     Intervention    PRN medication utilized:  yes - Clonodine    Instructed in medication usage and effects  Medications administered as ordered  Encouraged to verbalize needs      Response    Verbalized understanding   Did patient take medications as ordered yes   Did patient interact with assessment?  yes     Plan    Will monitor for safety  Will monitor every 15 minutes as ordered  Will evaluate and promote the plan of care    Last BM:  unknown date  (Please chart in I/O as well)   "

## 2023-09-05 LAB
GLUCOSE BLDC GLUCOMTR-MCNC: 231 MG/DL (ref 70–130)
GLUCOSE BLDC GLUCOMTR-MCNC: 236 MG/DL (ref 70–130)
GLUCOSE BLDC GLUCOMTR-MCNC: 269 MG/DL (ref 70–130)
GLUCOSE BLDC GLUCOMTR-MCNC: 341 MG/DL (ref 70–130)
GLUCOSE BLDC GLUCOMTR-MCNC: 360 MG/DL (ref 70–130)
GLUCOSE BLDC GLUCOMTR-MCNC: 368 MG/DL (ref 70–130)
GLUCOSE BLDC GLUCOMTR-MCNC: 478 MG/DL (ref 70–130)

## 2023-09-05 PROCEDURE — 63710000001 INSULIN ASPART PER 5 UNITS: Performed by: NURSE PRACTITIONER

## 2023-09-05 PROCEDURE — 63710000001 INSULIN DETEMIR PER 5 UNITS: Performed by: NURSE PRACTITIONER

## 2023-09-05 PROCEDURE — 82948 REAGENT STRIP/BLOOD GLUCOSE: CPT

## 2023-09-05 RX ORDER — LANOLIN ALCOHOL/MO/W.PET/CERES
1000 CREAM (GRAM) TOPICAL DAILY
Status: DISCONTINUED | OUTPATIENT
Start: 2023-09-05 | End: 2023-09-11 | Stop reason: HOSPADM

## 2023-09-05 RX ORDER — INSULIN ASPART 100 [IU]/ML
10 INJECTION, SOLUTION INTRAVENOUS; SUBCUTANEOUS
Status: DISCONTINUED | OUTPATIENT
Start: 2023-09-05 | End: 2023-09-07

## 2023-09-05 RX ADMIN — BUSPIRONE HYDROCHLORIDE 10 MG: 10 TABLET ORAL at 20:26

## 2023-09-05 RX ADMIN — BUSPIRONE HYDROCHLORIDE 10 MG: 10 TABLET ORAL at 15:02

## 2023-09-05 RX ADMIN — TICAGRELOR 90 MG: 90 TABLET ORAL at 20:26

## 2023-09-05 RX ADMIN — NICOTINE POLACRILEX 2 MG: 2 GUM, CHEWING BUCCAL at 16:09

## 2023-09-05 RX ADMIN — METOPROLOL SUCCINATE 50 MG: 50 TABLET, EXTENDED RELEASE ORAL at 08:03

## 2023-09-05 RX ADMIN — BUSPIRONE HYDROCHLORIDE 10 MG: 10 TABLET ORAL at 08:03

## 2023-09-05 RX ADMIN — GABAPENTIN 600 MG: 300 CAPSULE ORAL at 20:26

## 2023-09-05 RX ADMIN — LISINOPRIL 40 MG: 20 TABLET ORAL at 08:03

## 2023-09-05 RX ADMIN — INSULIN DETEMIR 30 UNITS: 100 INJECTION, SOLUTION SUBCUTANEOUS at 20:25

## 2023-09-05 RX ADMIN — PRAMIPEXOLE DIHYDROCHLORIDE 2 MG: 1 TABLET ORAL at 20:26

## 2023-09-05 RX ADMIN — LINAGLIPTIN 5 MG: 5 TABLET, FILM COATED ORAL at 08:03

## 2023-09-05 RX ADMIN — INSULIN ASPART 24 UNITS: 100 INJECTION, SOLUTION INTRAVENOUS; SUBCUTANEOUS at 12:24

## 2023-09-05 RX ADMIN — RIVAROXABAN 20 MG: 10 TABLET, FILM COATED ORAL at 17:11

## 2023-09-05 RX ADMIN — CYANOCOBALAMIN TAB 1000 MCG 1000 MCG: 1000 TAB at 15:02

## 2023-09-05 RX ADMIN — HYDROXYZINE PAMOATE 50 MG: 50 CAPSULE ORAL at 20:47

## 2023-09-05 RX ADMIN — ACETAMINOPHEN 650 MG: 325 TABLET, FILM COATED ORAL at 13:25

## 2023-09-05 RX ADMIN — INSULIN ASPART 8 UNITS: 100 INJECTION, SOLUTION INTRAVENOUS; SUBCUTANEOUS at 08:03

## 2023-09-05 RX ADMIN — ROSUVASTATIN CALCIUM 40 MG: 10 TABLET, FILM COATED ORAL at 08:02

## 2023-09-05 RX ADMIN — ESCITALOPRAM OXALATE 10 MG: 10 TABLET ORAL at 08:03

## 2023-09-05 RX ADMIN — DILTIAZEM HYDROCHLORIDE 120 MG: 120 CAPSULE, COATED, EXTENDED RELEASE ORAL at 08:02

## 2023-09-05 RX ADMIN — PANTOPRAZOLE SODIUM 40 MG: 40 TABLET, DELAYED RELEASE ORAL at 20:26

## 2023-09-05 RX ADMIN — TICAGRELOR 90 MG: 90 TABLET ORAL at 08:02

## 2023-09-05 RX ADMIN — PRAZOSIN HYDROCHLORIDE 1 MG: 1 CAPSULE ORAL at 20:26

## 2023-09-05 RX ADMIN — INSULIN ASPART 10 UNITS: 100 INJECTION, SOLUTION INTRAVENOUS; SUBCUTANEOUS at 17:12

## 2023-09-05 RX ADMIN — GABAPENTIN 600 MG: 300 CAPSULE ORAL at 15:02

## 2023-09-05 RX ADMIN — TRAZODONE HYDROCHLORIDE 150 MG: 150 TABLET ORAL at 20:26

## 2023-09-05 RX ADMIN — PRAZOSIN HYDROCHLORIDE 1 MG: 1 CAPSULE ORAL at 08:03

## 2023-09-05 RX ADMIN — GABAPENTIN 600 MG: 300 CAPSULE ORAL at 08:03

## 2023-09-05 RX ADMIN — INSULIN ASPART 10 UNITS: 100 INJECTION, SOLUTION INTRAVENOUS; SUBCUTANEOUS at 12:25

## 2023-09-05 RX ADMIN — OFLOXACIN 50000 UNITS: 300 TABLET, COATED ORAL at 13:33

## 2023-09-05 RX ADMIN — INSULIN ASPART 12 UNITS: 100 INJECTION, SOLUTION INTRAVENOUS; SUBCUTANEOUS at 17:11

## 2023-09-05 RX ADMIN — FENOFIBRATE 145 MG: 145 TABLET ORAL at 08:02

## 2023-09-05 NOTE — NURSING NOTE
"Behavior   Note any precipitants to event or behavior   Describe level and action of any aggressive behavior or speech and associated interventions.     Anxiety: Excess Worry and Restless/Edgy  Depression: depressed mood, feelings of worthlessness/guilt, and hopelessness  Pain  0  AVH   no  S/I   no  Plan  no  H/I   no  Plan  no    Affect   blunted      Note: Patient was seen in his room, sitting on the bed.  Patient stated, \"I am worried about my Mom being mad at me. I called my Dads wife Step Mother. I hope my Mom isn't mad at me.\" Patient denied SI/HI/AVH/PLAN. Patient complained of nervousness and depression. Patient was given Vistaril just prior to assessment. Patient was educated on Anxiety risk Factors and Coping Strategies. Patient was receptive of information given. Patient is independent and can make needs known to staff. Will follow.       Intervention    PRN medication utilized:  yes - Vistaril    Instructed in medication usage and effects  Medications administered as ordered  Encouraged to verbalize needs      Response    Verbalized understanding   Did patient take medications as ordered yes   Did patient interact with assessment?  yes     Plan    Will monitor for safety  Will monitor every 15 minutes as ordered  Will evaluate and promote the plan of care      "

## 2023-09-05 NOTE — PLAN OF CARE
Problem: Adult Behavioral Health Plan of Care  Goal: Plan of Care Review  9/5/2023 0430 by Iona Snider RN  Outcome: Ongoing, Progressing  Flowsheets  Taken 9/5/2023 0430  Plan of Care Reviewed With: patient  Patient Agreement with Plan of Care: agrees  Taken 9/5/2023 0427  Outcome Evaluation: Patient has remained cooperative with care. Patient has been concerned that his mother is upset with him. Patient complained of nervousness and depression. Patient was given Vistaril with relief.  Patient has slept 5.75 hours so far this shift.  Patient is independent and can make needs known to staff. Will follow.   Goal Outcome Evaluation:  Plan of Care Reviewed With: patient  Patient Agreement with Plan of Care: agrees     Progress: no change  Outcome Evaluation: Patient has remained cooperative with care. Patient has been concerned that his mother is upset with him. Patient complained of nervousness and depression. Patient was given Vistaril with relief.  Patient has slept 5.75 hours so far this shift.  Patient is independent and can make needs known to staff. Will follow.

## 2023-09-05 NOTE — PLAN OF CARE
Goal Outcome Evaluation:  Plan of Care Reviewed With: patient  Patient Agreement with Plan of Care: agrees     Progress: no change  Outcome Evaluation: No behaviors noted or reported. He has been cooperative et pleasant.

## 2023-09-05 NOTE — PROGRESS NOTES
[facility] Medicine   INPATIENT PROGRESS NOTE      Patient Name: Yordy Hansen  Date of Admission: 9/3/2023  Today's Date: 09/05/23  Length of Stay: 2  Primary Care Physician: Mami Perez APRN    Subjective   Chief Complaint: Denies any complaints  HPI   9/5/23 per nurse glucose in the 400 range.  Denies any current medical concerns or issues.        Review of Systems     All pertinent negatives and positives are as above. All other systems have been reviewed and are negative unless otherwise stated.     Objective    Temp:  [97.5 °F (36.4 °C)-98 °F (36.7 °C)] 97.5 °F (36.4 °C)  Heart Rate:  [71-78] 71  Resp:  [18-22] 18  BP: (127-142)/(59-72) 142/72    Physical Exam  Vitals reviewed.   Constitutional:       General: He is not in acute distress.     Appearance: He is obese.   HENT:      Head: Normocephalic.      Nose: Nose normal.      Mouth/Throat:      Pharynx: Oropharynx is clear.   Eyes:      Conjunctiva/sclera: Conjunctivae normal.   Cardiovascular:      Rate and Rhythm: Normal rate and regular rhythm.      Pulses: Normal pulses.      Heart sounds: Normal heart sounds.   Pulmonary:      Effort: Pulmonary effort is normal.      Breath sounds: Normal breath sounds.   Abdominal:      General: Bowel sounds are normal.   Musculoskeletal:         General: Normal range of motion.      Cervical back: Neck supple.   Skin:     General: Skin is warm.      Capillary Refill: Capillary refill takes less than 2 seconds.   Neurological:      Mental Status: He is alert and oriented to person, place, and time. Mental status is at baseline.   Psychiatric:         Thought Content: Thought content normal.           Results Review:  I have reviewed the labs, radiology results, and diagnostic studies.    Laboratory Data:   Results from last 7 days   Lab Units 09/03/23  1347   WBC 10*3/mm3 7.05   HEMOGLOBIN g/dL 14.7   HEMATOCRIT % 43.6   PLATELETS 10*3/mm3 196         Results from last 7 days   Lab Units 09/03/23  1347   SODIUM mmol/L 135*   POTASSIUM mmol/L 4.8   CHLORIDE mmol/L 99   CO2 mmol/L 26.0   BUN mg/dL 12   CREATININE mg/dL 0.67*   CALCIUM mg/dL 9.6   BILIRUBIN mg/dL 0.4   ALK PHOS U/L 66   ALT (SGPT) U/L 31   AST (SGOT) U/L 23   GLUCOSE mg/dL 414*       Culture Data:   No results found for: BLOODCX  No results found for: URINECX  No results found for: RESPCX  No results found for: WOUNDCX  No results found for: STOOLCX  No components found for: BODYFLD    Radiology Data:   Imaging Results (Last 24 Hours)       ** No results found for the last 24 hours. **            I have reviewed the patient's current medications.     Assessment/Plan     I have utilized all available immediate resources to obtain, update, or review the patient's current medications (including all prescriptions, over-the-counter products, herbals, cannabis/cannabidiol products, and vitamin/mineral/dietary (nutritional) supplements).      Active Hospital Problems    Diagnosis     **Major depression     Post traumatic stress disorder (PTSD)     A-fib     Essential hypertension     Type 2 diabetes mellitus with hyperglycemia, with long-term current use of insulin     Factor II deficiency     Coronary artery disease of native artery with stable angina pectoris     Class 3 severe obesity due to excess calories with serious comorbidity and body mass index (BMI) of 60.0 to 69.9 in adult    CAD  DMII with peripheral neuropathy  HTN  HLD  ADOLFO  Chronic PE on xarelto  Chronic back pain        Plan:      1.  Psychiatric management per primary team  2.  Medications reviewed  3. Continue: lisinopril, cardizem, metoprolol, tradjenta, brilinta, gabapentin at reduced dose. High dose SSI TID AC. Increase levemir to 30 units sc at bedtime and add mealtime insulin 10 units TIDAC    Medical Decision Making  Number and Complexity of problems: multiple moderate    Conditions and Status:        Condition is unchanged.      MDM Data  External documents reviewed: None today  My EKG interpretation: None today  My  interpretation: None today  Tests considered but not ordered: None today       Discussed with: Patient, attending      Treatment Plan  As above    Care Planning  Shared decision making: With patient  Code status and discussions: full    Disposition  Social Determinants of Health that impact treatment or disposition: n/a       I confirmed that the patient's Advance Care Plan is present, code status is documented, or surrogate decision maker is listed in the patient's medical record.   ________________________________        Copied text in this note has been reviewed and is accurate as of 09/05/23       Electronically signed by LALA Ceballos, 09/05/23, 18:11 CDT.

## 2023-09-05 NOTE — PLAN OF CARE
Problem: Adult Behavioral Health Plan of Care  Goal: Plan of Care Review  Outcome: Ongoing, Progressing  Flowsheets (Taken 9/5/2023 1329)  Consent Given to Review Plan with: Refused  Progress: no change  Plan of Care Reviewed With: patient  Patient Agreement with Plan of Care: agrees  Outcome Evaluation: NEW SW EVAL  Goal: Patient-Specific Goal (Individualization)  Outcome: Ongoing, Progressing  Flowsheets (Taken 9/5/2023 1329)  Patient Personal Strengths:   expressive of emotions   expressive of needs   resilient  Patient-Specific Goals (Include Timeframe): Pt to have NO SI\HI\AVH at this time and to verbalize 1-3 learned coping skills  Individualized Care Needs: Group, education, safety planning, aftercare, family session  Anxieties, Fears or Concerns: None voiced  Patient Vulnerabilities:   adverse childhood experience(s)   family/relationship conflict   housing insecurity   occupational insecurity   limited support system   limited social skills   food insecurity   lacks insight into illness   poor impulse control   poor physical health   substance abuse/addiction  Goal: Optimized Coping Skills in Response to Life Stressors  Outcome: Ongoing, Progressing  Flowsheets (Taken 9/5/2023 1329)  Optimized Coping Skills in Response to Life Stressors: making progress toward outcome  Intervention: Promote Effective Coping Strategies  Flowsheets (Taken 9/5/2023 1329)  Supportive Measures:   active listening utilized   self-care encouraged   verbalization of feelings encouraged   self-reflection promoted   journaling promoted  Goal: Develops/Participates in Therapeutic [place] to Support Successful Transition  Outcome: Ongoing, Progressing  Flowsheets (Taken 9/5/2023 1329)  Develops/Participates in Therapeutic [place] to Support Successful Transition: making progress toward outcome  Intervention: Foster Therapeutic [place]  Flowsheets (Taken 9/5/2023 1329)  Trust Relationship/Rapport:   care explained   choices  provided   reassurance provided   thoughts/feelings acknowledged   emotional support provided   empathic listening provided   questions answered   questions encouraged  Intervention: Mutually Develop Transition Plan  Flowsheets (Taken 9/5/2023 1327)  Outpatient/Agency/Support Group Needs:   outpatient medication management   outpatient counseling   outpatient psychiatric care (specify)   case management  Discharge Coordination/Progress: [facility], Refer to Grief Counseling  Transition Support: follow-up care discussed  Transportation Anticipated: car, drives self  Anticipated Discharge Disposition: home or self-care  Transportation Concerns: none  Current Discharge Risk:   physical impairment   psychiatric illness   substance use/abuse   lives alone   lack of support system/caregiver   financial support inadequate  Concerns to be Addressed:   adjustment to diagnosis/illness   care coordination/care conferences   cognitive/perceptual   compliance issue   coping/stress   decision-making   basic needs   grief and loss   discharge planning   medication   mental health   substance/tobacco abuse/use   suicidal  Readmission Within the Last 30 Days: no previous admission in last 30 days  Patient/Family Anticipated Services at Transition:      mental health services  Patient's Choice of Community Agency(s): [facility]  Patient/Family Anticipates Transition to: home  Offered/Gave Vendor List: no     Problem: COPD (Chronic Obstructive Pulmonary Disease) Comorbidity  Goal: Maintenance of COPD Symptom Control  Intervention: Maintain COPD-Symptom Control  Recent Flowsheet Documentation  Taken 9/5/2023 1329 by Rosana Wolf, MSW  Supportive Measures:   active listening utilized   self-care encouraged   verbalization of feelings encouraged   self-reflection promoted   journaling promoted     Problem: Pain Chronic (Persistent) (Comorbidity Management)  Goal: Acceptable Pain Control and Functional Ability  Intervention:  Optimize Psychosocial Wellbeing  Recent Flowsheet Documentation  Taken 9/5/2023 1329 by Rosana Wolf MSW  Supportive Measures:   active listening utilized   self-care encouraged   verbalization of feelings encouraged   self-reflection promoted   journaling promoted  Goal: Acceptable Pain Control and Functional Ability  Intervention: Optimize Psychosocial Wellbeing  Recent Flowsheet Documentation  Taken 9/5/2023 1329 by Rosana Wolf MSW  Supportive Measures:   active listening utilized   self-care encouraged   verbalization of feelings encouraged   self-reflection promoted   journaling promoted     Problem: Mood Impairment (Anxiety Signs/Symptoms)  Goal: Improved Mood Symptoms (Anxiety Signs/Symptoms)  Intervention: Optimize Emotion and Mood  Recent Flowsheet Documentation  Taken 9/5/2023 1329 by Rosana Wolf MSW  Supportive Measures:   active listening utilized   self-care encouraged   verbalization of feelings encouraged   self-reflection promoted   journaling promoted     Problem: Social, Occupational or Functional Impairment (Anxiety Signs/Symptoms)  Goal: Enhanced Social, Occupational or Functional Skills (Anxiety Signs/Symptoms)  Intervention: Promote Social, Occupational and Functional Ability  Recent Flowsheet Documentation  Taken 9/5/2023 1329 by Rosana Wolf MSW  Trust Relationship/Rapport:   care explained   choices provided   reassurance provided   thoughts/feelings acknowledged   emotional support provided   empathic listening provided   questions answered   questions encouraged     Problem: Decreased Participation/Engagement (Depressive Signs/Symptoms)  Goal: Increased Participation and Engagement (Depressive Signs/Symptoms)  Intervention: Facilitate Participation and Engagement  Recent Flowsheet Documentation  Taken 9/5/2023 1329 by Rosana Wolf MSW  Supportive Measures:   active listening utilized   self-care encouraged   verbalization of feelings encouraged   self-reflection promoted    journaling promoted     Problem: Mood Impairment (Depressive Signs/Symptoms)  Goal: Improved Mood Symptoms (Depressive Signs/Symptoms)  Intervention: Promote Mood Improvement  Recent Flowsheet Documentation  Taken 9/5/2023 1329 by Rosana Wolf MSW  Supportive Measures:   active listening utilized   self-care encouraged   verbalization of feelings encouraged   self-reflection promoted   journaling promoted     Problem: Social, Occupational or Functional Impairment (Depressive Signs/Symptoms)  Goal: Enhanced Social, Occupational or Functional Skills (Depressive Signs/Symptoms)  Intervention: Promote Social, Occupational and Functional Ability  Recent Flowsheet Documentation  Taken 9/5/2023 1329 by Rosana Wolf MSW  Trust Relationship/Rapport:   care explained   choices provided   reassurance provided   thoughts/feelings acknowledged   emotional support provided   empathic listening provided   questions answered   questions encouraged     Goal Outcome Evaluation:  Plan of Care Reviewed With: patient  Patient Agreement with Plan of Care: agrees  Consent Given to Review Plan with: Refused  Progress: no change  Outcome Evaluation: NEW SW EVAL

## 2023-09-05 NOTE — NURSING NOTE
"Pt informed staff that he was not feeling well after eating supper et receiving insulin. Pt noted to be lying in bed holding his head with c/o feeling \"woozy\". V/S 142/72, 71p, 96% RA. Blood sugar 368. Pts blood sugar noted to be 478 before meal. Pt agreed that he is used to his blood sugar running high et is not used to it being low. Pt instructed that his blood sugar will continue to drop et to yell for staff if he feels worse. Pt denied chest pain, chest tightness or any other symptoms, stating he feels like he normally does other than his head. Will monitor. Tech instructed to retake blood sugar in 15 minutes.   "

## 2023-09-05 NOTE — PROGRESS NOTES
Psychiatry Progress Note    9/5/2023    Legal Status: Voluntary    Chief Complaint:  severe depression    Subjective:  Patient is a 42 y.o. male who was hospitalized for  severe depression .    Patient is seen individually and in treatment team. Patient states he lost his mother a year ago. Patient witnessed his mother collapsing and watched as she was wheeled out on the stretcher. Patient states he had been having visions of her dead body. He states he needs help.    Patient states he has felt lonely and that he feels as if everyone has moved on with their lives and forgotten about his mother. He states his father has remarried. He also states he has been living in his mother's trailer due to his trailer needing plumbing repairs. She states he continuously thinks about his mother.     Patient denies SI/HI/AVH. Patient is eating and sleeping well. Patient states he did not have any nightmares overnight. Patient is compliant with medications.     Patient reports medications are tolerable.    Objective     Vital Signs    Vitals:    09/04/23 0900 09/04/23 1100 09/04/23 1900 09/05/23 0800   BP: (!) 183/86 145/69 127/65 129/59   BP Location:  Right arm Right arm Right arm   Patient Position:  Sitting Sitting Sitting   Pulse: 84 75 78 72   Resp: 18  18 22   Temp: 97.3 °F (36.3 °C)  98 °F (36.7 °C) 97.5 °F (36.4 °C)   TempSrc: Temporal  Tympanic Temporal   SpO2:   96% 96%   Weight:       Height:           Physical Exam: as of today, 09/05/23   General Appearance: alert, appears stated age, and cooperative,  Hygiene:   fair  Gait & Station: Normal  Musculoskeletal: No tremors or abnormal involuntary movements    Mental Status Exam: as of today, 09/05/23   Cooperation:  Cooperative  Eye Contact:  Fair  Psychomotor Behavior:  Appropriate  Mood: Sad/Depressed  Affect:  mood-congruent  Speech:  Normal  Thought Process:  Coherent  Associations: Circumstantial  Thought Content:     Mood congruent   Suicidal:   Denies   Homicidal:   None   Hallucinations:  None   Delusion:  None  Cognitive Functioning:   Consciousness: awake and alert  Reliability:   adequate  Insight:  Fair  Judgement:  Fair  Impulse Control:  Fair    Lab Results: Results source: EMR   Lab Results (last 24 hours)       Procedure Component Value Units Date/Time    POC Glucose Once [[id number]]  (Abnormal) Collected: 09/05/23 0608    Specimen: Blood Updated: 09/05/23 0621     Glucose 231 mg/dL      Comment: RN NotifiedOperator: [id number] JAZZY CASTILLOMeter ID: [id number]       Folate [[id number]]  (Normal) Collected: 09/03/23 1347    Specimen: Blood Updated: 09/04/23 2256     Folate >20.00 ng/mL      Comment: Specimen hemolyzed.  Results may be affected.       Narrative:      Results may be falsely increased if patient taking Biotin.      Vitamin D,25-Hydroxy [[id number]]  (Abnormal) Collected: 09/03/23 1347    Specimen: Blood Updated: 09/04/23 2239     25 Hydroxy, Vitamin D 15.3 ng/ml     Narrative:      Reference Range for Total Vitamin D 25(OH)     Deficiency <20.0 ng/mL   Insufficiency 21-29 ng/mL   Sufficiency  ng/mL  Toxicity >100 ng/ml      Vitamin B12 [[id number]]  (Normal) Collected: 09/03/23 1347    Specimen: Blood Updated: 09/04/23 2239     Vitamin B-12 292 pg/mL     Narrative:      Results may be falsely increased if patient taking Biotin.      POC Glucose Once [[id number]]  (Abnormal) Collected: 09/04/23 2002    Specimen: Blood Updated: 09/04/23 2012     Glucose 283 mg/dL      Comment: Result Not ConfirmedOperator: [id number] JAZZY CASTILLOMeter ID: [id number]       POC Glucose Once [[id number]]  (Abnormal) Collected: 09/04/23 1611    Specimen: Blood Updated: 09/04/23 1628     Glucose 359 mg/dL      Comment: RN NotifiedOperator: [id number] KYLE Byrnes ID: [id number]               Radiology Results:  Imaging Results (Last 24 Hours)       ** No results found for the last 24 hours. **            Medicine:   Current Facility-Administered Medications:      acetaminophen (TYLENOL) tablet 650 mg, 650 mg, Oral, Q4H PRN, France Gilbert MD, 650 mg at 09/04/23 0024    albuterol (PROVENTIL) nebulizer solution 0.083% 2.5 mg/3mL, 2.5 mg, Nebulization, Q6H PRN, Purnima Crockett, APRN    aluminum-magnesium hydroxide-simethicone (MAALOX MAX) 400-400-40 MG/5ML suspension 15 mL, 15 mL, Oral, Q6H PRN, France Gilbert MD    busPIRone (BUSPAR) tablet 10 mg, 10 mg, Oral, TID, France Gilbert MD, 10 mg at 09/05/23 0803    cloNIDine (CATAPRES) tablet 0.1 mg, 0.1 mg, Oral, Q4H PRN, France Gilbert MD, 0.1 mg at 09/04/23 0804    dextrose (D50W) (25 g/50 mL) IV injection 25 g, 25 g, Intravenous, Q15 Min PRN, France Gilbert MD    dextrose (GLUTOSE) oral gel 15 g, 15 g, Oral, Q15 Min PRN, Purnmia Crockett, APRN    dilTIAZem CD (CARDIZEM CD) 24 hr capsule 120 mg, 120 mg, Oral, Daily, Jose Crocketta MARIETTA, APRN, 120 mg at 09/05/23 0802    [COMPLETED] escitalopram (LEXAPRO) tablet 5 mg, 5 mg, Oral, Daily, 5 mg at 09/04/23 1306 **FOLLOWED BY** escitalopram (LEXAPRO) tablet 10 mg, 10 mg, Oral, Daily, France Gilbert MD, 10 mg at 09/05/23 0803    fenofibrate (TRICOR) tablet 145 mg, 145 mg, Oral, Daily, Jose Crocketta MARIETTA, APRN, 145 mg at 09/05/23 0802    gabapentin (NEURONTIN) capsule 600 mg, 600 mg, Oral, TID, France Gilbert MD, 600 mg at 09/05/23 0803    glucagon HCl (Diagnostic) injection 1 mg, 1 mg, Intramuscular, Q15 Min PRN, France Gilbert, MD    hydrOXYzine pamoate (VISTARIL) capsule 50 mg, 50 mg, Oral, Q6H PRN, France Gilbert MD, 50 mg at 09/04/23 1908    Insulin Aspart (novoLOG) injection 0-24 Units, 0-24 Units, Subcutaneous, TID AC, Jose Crocketta A, APRN, 24 Units at 09/05/23 1224    Insulin Aspart (novoLOG) injection 10 Units, 10 Units, Subcutaneous, TID With Meals, Purnima Crockett, APRN, 10 Units at 09/05/23 1225    insulin detemir (LEVEMIR) injection 30 Units, 30 Units, Subcutaneous, Nightly, Purnima Crockett, APRBRIANNE    linagliptin (TRADJENTA) tablet  5 mg, 5 mg, Oral, Daily, Bon, Purnima A, APRN, 5 mg at 09/05/23 0803    lisinopril (PRINIVIL,ZESTRIL) tablet 40 mg, 40 mg, Oral, Daily, Crockett, Purnima A, APRN, 40 mg at 09/05/23 0803    loperamide (IMODIUM) capsule 2 mg, 2 mg, Oral, Q2H PRN, France Gilbert MD    magnesium hydroxide (MILK OF MAGNESIA) suspension 10 mL, 10 mL, Oral, Daily PRN, France Gilbert MD    metoprolol succinate XL (TOPROL-XL) 24 hr tablet 50 mg, 50 mg, Oral, Daily, Bon, Purnima A, APRN, 50 mg at 09/05/23 0803    nicotine polacrilex (NICORETTE) gum 2 mg, 2 mg, Mouth/Throat, Q1H PRN, France Gilbert MD    ondansetron ODT (ZOFRAN-ODT) disintegrating tablet 4 mg, 4 mg, Oral, Q6H PRN, France Gilbert MD    pantoprazole (PROTONIX) EC tablet 40 mg, 40 mg, Oral, Nightly, Jose Crocketta A, APRN, 40 mg at 09/04/23 2038    pramipexole (MIRAPEX) tablet 2 mg, 2 mg, Oral, Nightly, France Gilbert MD, 2 mg at 09/04/23 2037    prazosin (MINIPRESS) capsule 1 mg, 1 mg, Oral, BID, France Gilbert MD, 1 mg at 09/05/23 0803    rivaroxaban (XARELTO) tablet 20 mg, 20 mg, Oral, Daily With Dinner, Bon Purnima A, APRN, 20 mg at 09/04/23 1722    rosuvastatin (CRESTOR) tablet 40 mg, 40 mg, Oral, Daily, France Gilbert MD, 40 mg at 09/05/23 0802    ticagrelor (BRILINTA) tablet 90 mg, 90 mg, Oral, Q12H, Bon, Purnima A, APRN, 90 mg at 09/05/23 0802    traZODone (DESYREL) tablet 150 mg, 150 mg, Oral, Nightly PRN, France Gilbert MD    traZODone (DESYREL) tablet 150 mg, 150 mg, Oral, Nightly, France Gilbert MD, 150 mg at 09/04/23 2038    Diagnoses/Assessment:     Major depression    Factor II deficiency    Class 3 severe obesity due to excess calories with serious comorbidity and body mass index (BMI) of 60.0 to 69.9 in adult    Coronary artery disease of native artery with stable angina pectoris    Essential hypertension    Type 2 diabetes mellitus with hyperglycemia, with long-term current use of insulin    A-fib    Post  traumatic stress disorder (PTSD)      Treatment Plan:    1) Will continue care for the patient on the behavioral health unit at [facility] to ensure patient safety.  2) Will continue to provide treatment with the unit milieu, activities, therapies and psychopharmacological management.  3) Patient to be placed on or continued on  Q15 minute checks  and Suicide precautions.  4) Pertinent medical issues:   --A-Fib: diltiazem, xarelto and brilinta  --Restless leg/neuropathy: mirapex and gabapentin  --Diabetes: insulin long acting and SSI, tradjenta  --HTN: lisinopril, metoprolol  --HLD: fenofibrate and statin  --GERD: PPI  --Low vit D: Supplement  5) Will order following labs: Reviewed  6) Will make the following medication changes:   --Cont Buspar 10mg tid   --Cont Lexapro 10mg daily  --Cont Prazosin 1mg bid  --Cont trazodone 150mg qhs and 150mg qhs PRN as he was on trazodone 300mg qhs for many years until stopped a couple of months ago when switched to Restoril.  Given the dose it may have had benefit for depression.  7) Will continue discharge planning for patient: outpatient psychiatric care, outpatient medical care, safety planning and placement as appropriate.    Treatment plan and medication risks and benefits discussed with: Patient and treatment team    LALA Shafer  09/05/23 at 13:00 CDT

## 2023-09-05 NOTE — PROGRESS NOTES
Nutrition Services    Patient Name:  Yordy Hansen  YOB: 1980  MRN: [id number]  Admit Date:  9/3/2023    RN asked pt if he wanted to speak with an RD as pt's BG level has been elevated.  Pt wanted to.  RD printed out handout related to ADA eating practices.  Pt asked appropriate questions and all questions were answered.  RD staff available for further assistance.     Electronically signed by:  Jamia Aranda RD  09/05/23 13:35 CDT

## 2023-09-05 NOTE — PLAN OF CARE
"Treatment team met with patient to discuss and review treatment plan. Pt was encouraged to discuss their reason for admission, as well as their goals for treatment. Team discussed anticipated interventions and treatment modalities that will be used to address goals.Pt given opportunity to discuss any concerns re: treatment plan.  Patient states \"I kept thinking about my momma and stuff, she  and I kept seeing her dead body and I guess I need help.\" Patient has been staying at his mothers home, pt encouraged to get his home fixed and return there. Patient reports sleeping well last night.  Team Members present during meeting:    MD:Dr. Gilbert  APRN: Holly  RN: Christina De La Garza Autumn  SW: Rosana  RT:Marisol  Other: Bill                          "

## 2023-09-05 NOTE — NURSING NOTE
Behavior   Note any precipitants to event or behavior   Describe level and action of any aggressive behavior or speech and associated interventions.     Anxiety: Patient denies at this time  Depression: depressed mood and fatigue  Pain  0  AVH   no  S/I   no  Plan  no  H/I   no  Plan  no    Affect   euthymic/normal       Note: Pt noted to be lying in bed with cpap on this am when this nurse et tech entered room to do a skin assessment on pt. Staff had noted pt had what appeared to be remnants of tobacco (dip) in his mouth et per staff pt would cover his mouth when staff began to closely examine him during speaking. Skin assessment completed with no contraband found on his person or in clothes. Pt noted to have a faint rash like area to his right hip area, et body odor. HS staff searched pts room et found smokeless tobacco hidden in pts shoe. He is currently in common area et has not voiced concern about not having tobacco this far. He denies HI, SI et hallucinations et says that he slept good.        Intervention    PRN medication utilized:  no    Instructed in medication usage and effects  Medications administered as ordered  Encouraged to verbalize needs      Response    Verbalized understanding   Did patient take medications as ordered yes   Did patient interact with assessment?  yes     Plan    Will monitor for safety  Will monitor every 15 minutes as ordered  Will evaluate and promote the plan of care    Last BM:  unknown date  (Please chart in I/O as well)
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
## 2023-09-06 LAB
GLUCOSE BLDC GLUCOMTR-MCNC: 216 MG/DL (ref 70–130)
GLUCOSE BLDC GLUCOMTR-MCNC: 267 MG/DL (ref 70–130)
GLUCOSE BLDC GLUCOMTR-MCNC: 300 MG/DL (ref 70–130)
GLUCOSE BLDC GLUCOMTR-MCNC: 305 MG/DL (ref 70–130)

## 2023-09-06 PROCEDURE — 63710000001 INSULIN DETEMIR PER 5 UNITS: Performed by: NURSE PRACTITIONER

## 2023-09-06 PROCEDURE — 63710000001 INSULIN ASPART PER 5 UNITS: Performed by: NURSE PRACTITIONER

## 2023-09-06 PROCEDURE — 82948 REAGENT STRIP/BLOOD GLUCOSE: CPT

## 2023-09-06 RX ADMIN — CYANOCOBALAMIN TAB 1000 MCG 1000 MCG: 1000 TAB at 08:41

## 2023-09-06 RX ADMIN — DILTIAZEM HYDROCHLORIDE 120 MG: 120 CAPSULE, COATED, EXTENDED RELEASE ORAL at 08:41

## 2023-09-06 RX ADMIN — PRAMIPEXOLE DIHYDROCHLORIDE 2 MG: 1 TABLET ORAL at 20:32

## 2023-09-06 RX ADMIN — GABAPENTIN 600 MG: 300 CAPSULE ORAL at 15:14

## 2023-09-06 RX ADMIN — FENOFIBRATE 145 MG: 145 TABLET ORAL at 08:40

## 2023-09-06 RX ADMIN — ROSUVASTATIN CALCIUM 40 MG: 10 TABLET, FILM COATED ORAL at 08:40

## 2023-09-06 RX ADMIN — ESCITALOPRAM OXALATE 10 MG: 10 TABLET ORAL at 08:41

## 2023-09-06 RX ADMIN — HYDROXYZINE PAMOATE 50 MG: 50 CAPSULE ORAL at 09:08

## 2023-09-06 RX ADMIN — TICAGRELOR 90 MG: 90 TABLET ORAL at 08:41

## 2023-09-06 RX ADMIN — INSULIN ASPART 10 UNITS: 100 INJECTION, SOLUTION INTRAVENOUS; SUBCUTANEOUS at 17:03

## 2023-09-06 RX ADMIN — PRAZOSIN HYDROCHLORIDE 1 MG: 1 CAPSULE ORAL at 20:32

## 2023-09-06 RX ADMIN — BUSPIRONE HYDROCHLORIDE 10 MG: 10 TABLET ORAL at 20:32

## 2023-09-06 RX ADMIN — GABAPENTIN 600 MG: 300 CAPSULE ORAL at 20:32

## 2023-09-06 RX ADMIN — INSULIN ASPART 10 UNITS: 100 INJECTION, SOLUTION INTRAVENOUS; SUBCUTANEOUS at 08:46

## 2023-09-06 RX ADMIN — NICOTINE POLACRILEX 2 MG: 2 GUM, CHEWING BUCCAL at 09:08

## 2023-09-06 RX ADMIN — GABAPENTIN 600 MG: 300 CAPSULE ORAL at 08:39

## 2023-09-06 RX ADMIN — TRAZODONE HYDROCHLORIDE 150 MG: 150 TABLET ORAL at 20:32

## 2023-09-06 RX ADMIN — INSULIN ASPART 12 UNITS: 100 INJECTION, SOLUTION INTRAVENOUS; SUBCUTANEOUS at 08:30

## 2023-09-06 RX ADMIN — METOPROLOL SUCCINATE 50 MG: 50 TABLET, EXTENDED RELEASE ORAL at 08:41

## 2023-09-06 RX ADMIN — RIVAROXABAN 20 MG: 10 TABLET, FILM COATED ORAL at 17:03

## 2023-09-06 RX ADMIN — INSULIN ASPART 16 UNITS: 100 INJECTION, SOLUTION INTRAVENOUS; SUBCUTANEOUS at 12:03

## 2023-09-06 RX ADMIN — INSULIN ASPART 16 UNITS: 100 INJECTION, SOLUTION INTRAVENOUS; SUBCUTANEOUS at 17:03

## 2023-09-06 RX ADMIN — LINAGLIPTIN 5 MG: 5 TABLET, FILM COATED ORAL at 08:42

## 2023-09-06 RX ADMIN — HYDROXYZINE PAMOATE 50 MG: 50 CAPSULE ORAL at 15:14

## 2023-09-06 RX ADMIN — INSULIN ASPART 10 UNITS: 100 INJECTION, SOLUTION INTRAVENOUS; SUBCUTANEOUS at 12:03

## 2023-09-06 RX ADMIN — PRAZOSIN HYDROCHLORIDE 1 MG: 1 CAPSULE ORAL at 08:39

## 2023-09-06 RX ADMIN — NICOTINE POLACRILEX 2 MG: 2 GUM, CHEWING BUCCAL at 14:26

## 2023-09-06 RX ADMIN — BUSPIRONE HYDROCHLORIDE 10 MG: 10 TABLET ORAL at 15:14

## 2023-09-06 RX ADMIN — LISINOPRIL 40 MG: 20 TABLET ORAL at 08:41

## 2023-09-06 RX ADMIN — PANTOPRAZOLE SODIUM 40 MG: 40 TABLET, DELAYED RELEASE ORAL at 20:32

## 2023-09-06 RX ADMIN — INSULIN DETEMIR 40 UNITS: 100 INJECTION, SOLUTION SUBCUTANEOUS at 20:33

## 2023-09-06 RX ADMIN — NICOTINE POLACRILEX 2 MG: 2 GUM, CHEWING BUCCAL at 19:19

## 2023-09-06 RX ADMIN — TICAGRELOR 90 MG: 90 TABLET ORAL at 20:52

## 2023-09-06 RX ADMIN — BUSPIRONE HYDROCHLORIDE 10 MG: 10 TABLET ORAL at 08:40

## 2023-09-06 NOTE — PROGRESS NOTES
"Psychiatry Progress Note    9/6/2023    Legal Status: Voluntary    Chief Complaint:  severe depression    Subjective:  Patient is a 42 y.o. male who was hospitalized for  severe depression . Patient is seen in the common area of the adult unit. Patient states he is doing \"good and bad\". Patient states he had attended a group session on grief and became irritated when another peer \"took over\" the group. He states the peer monopolized the time and did not allow anyone else to share their thoughts and feelings. He states he became so frustrated that he felt like banging his head against the wall. He states he was able to control his irritation by clinching his fists and taking slow deep breaths.     Patient states his mood and thoughts surrounding thoughts of his mother have improved. He states the intrusive thoughts and images surrounding the events prior to her death are diminishing. He states he is concerned that he will forget her since those thoughts are diminishing. Discussed focusing on positive memories of his mother.     Patient denies SI/HI/AVH. Patient is compliant with medications. Patient denies issue with sleep or appetite. Patient attends group sessions.        Patient reports medications are effective.    Objective     Vital Signs    Vitals:    09/05/23 1300 09/05/23 1946 09/06/23 0811 09/06/23 1430   BP: 142/72 96/54 154/73 127/58   BP Location: Right arm Left arm Other (Comment) Left arm   Patient Position: Sitting Sitting Sitting Sitting   Pulse: 71 71 86 76   Resp: 18 20 18 18   Temp:  97.7 °F (36.5 °C) 97.7 °F (36.5 °C)    TempSrc:  Temporal Temporal    SpO2: 96% 98% 95% 96%   Weight:       Height:           Physical Exam: as of today, 09/06/23   General Appearance: alert, appears stated age, and cooperative,  Hygiene:   fair  Gait & Station: Normal  Musculoskeletal: No tremors or abnormal involuntary movements    Mental Status Exam: as of today, 09/06/23   Cooperation:  Cooperative  Eye Contact:  " Fair  Psychomotor Behavior:  Appropriate  Mood: Improving  Affect:  mood-congruent  Speech:  Normal  Thought Process:  Coherent  Associations: Goal Directed  Thought Content:     Mood congruent   Suicidal:   Denies   Homicidal:  None   Hallucinations:  None   Delusion:  None  Cognitive Functioning:   Consciousness: awake, alert, and oriented  Reliability:   adequate  Insight:  Fair  Judgement:  Fair  Impulse Control:  Fair    Lab Results: Results source: EMR   Lab Results (last 24 hours)       Procedure Component Value Units Date/Time    POC Glucose Once [[id number]]  (Abnormal) Collected: 09/06/23 1130    Specimen: Blood Updated: 09/06/23 1149     Glucose 300 mg/dL      Comment: RN NotifiedOperator: [id number] KYLE Camillater ID: [id number]       POC Glucose Once [[id number]]  (Abnormal) Collected: 09/06/23 0605    Specimen: Blood Updated: 09/06/23 0620     Glucose 267 mg/dL      Comment: RN NotifiedOperator: [id number] JAZZY DNAtriXBLADIMIRMeter ID: [id number]       POC Glucose Once [[id number]]  (Abnormal) Collected: 09/05/23 2022    Specimen: Blood Updated: 09/05/23 2034     Glucose 236 mg/dL      Comment: RN NotifiedOperator: [id number] JAZZY TCM BerthaMeter ID: [id number]       POC Glucose Once [[id number]]  (Abnormal) Collected: 09/05/23 1640    Specimen: Blood Updated: 09/05/23 1656     Glucose 269 mg/dL      Comment: RN NotifiedOperator: [id number] KYLE Sampsonter ID: [id number]       POC Glucose Once [[id number]]  (Abnormal) Collected: 09/05/23 1147    Specimen: Blood Updated: 09/05/23 1608     Glucose 478 mg/dL      Comment: RN NotifiedOperator: [id number] KYLE ROCKYLIEMeter ID: [id number]               Radiology Results:  Imaging Results (Last 24 Hours)       ** No results found for the last 24 hours. **            Medicine:   Current Facility-Administered Medications:     acetaminophen (TYLENOL) tablet 650 mg, 650 mg, Oral, Q4H PRN, France Gilbert MD, 650 mg at 09/05/23 1325    albuterol  (PROVENTIL) nebulizer solution 0.083% 2.5 mg/3mL, 2.5 mg, Nebulization, Q6H PRN, Jose Crocketta A, APRN    aluminum-magnesium hydroxide-simethicone (MAALOX MAX) 400-400-40 MG/5ML suspension 15 mL, 15 mL, Oral, Q6H PRN, France Gilbert MD    busPIRone (BUSPAR) tablet 10 mg, 10 mg, Oral, TID, France Gilbert MD, 10 mg at 09/06/23 1514    cholecalciferol (VITAMIN D3) capsule 50,000 Units, 50,000 Units, Oral, Q7 Days, Holly York APRN, 50,000 Units at 09/05/23 1333    cloNIDine (CATAPRES) tablet 0.1 mg, 0.1 mg, Oral, Q4H PRN, France Gilbert MD, 0.1 mg at 09/04/23 0804    dextrose (D50W) (25 g/50 mL) IV injection 25 g, 25 g, Intravenous, Q15 Min PRN, France Gilbert MD    dextrose (GLUTOSE) oral gel 15 g, 15 g, Oral, Q15 Min PRN, Purnima Crockett, APRN    dilTIAZem CD (CARDIZEM CD) 24 hr capsule 120 mg, 120 mg, Oral, Daily, Jose Crocketta MARIETTA, APRN, 120 mg at 09/06/23 0841    [COMPLETED] escitalopram (LEXAPRO) tablet 5 mg, 5 mg, Oral, Daily, 5 mg at 09/04/23 1306 **FOLLOWED BY** escitalopram (LEXAPRO) tablet 10 mg, 10 mg, Oral, Daily, France Gilbert MD, 10 mg at 09/06/23 0841    fenofibrate (TRICOR) tablet 145 mg, 145 mg, Oral, Daily, Purnima Crockett, APRN, 145 mg at 09/06/23 0840    gabapentin (NEURONTIN) capsule 600 mg, 600 mg, Oral, TID, France Gilbert MD, 600 mg at 09/06/23 1514    glucagon HCl (Diagnostic) injection 1 mg, 1 mg, Intramuscular, Q15 Min PRN, France Gilbert MD    hydrOXYzine pamoate (VISTARIL) capsule 50 mg, 50 mg, Oral, Q6H PRN, France Gilbert MD, 50 mg at 09/06/23 1514    Insulin Aspart (novoLOG) injection 0-24 Units, 0-24 Units, Subcutaneous, TID AC, Purnima Crockett APRN, 16 Units at 09/06/23 1203    Insulin Aspart (novoLOG) injection 10 Units, 10 Units, Subcutaneous, TID With Meals, Purnima Crockett APRN, 10 Units at 09/06/23 1203    insulin detemir (LEVEMIR) injection 40 Units, 40 Units, Subcutaneous, Nightly, Purnima Crockett APRN    linagliptin  (TRADJENTA) tablet 5 mg, 5 mg, Oral, Daily, Bon, Purnima A, APRN, 5 mg at 09/06/23 0842    lisinopril (PRINIVIL,ZESTRIL) tablet 40 mg, 40 mg, Oral, Daily, Crockett, Purnima A, APRN, 40 mg at 09/06/23 0841    loperamide (IMODIUM) capsule 2 mg, 2 mg, Oral, Q2H PRN, France Gilbert MD    magnesium hydroxide (MILK OF MAGNESIA) suspension 10 mL, 10 mL, Oral, Daily PRN, France Gilbert MD    metoprolol succinate XL (TOPROL-XL) 24 hr tablet 50 mg, 50 mg, Oral, Daily, Bon, Purnima A, APRN, 50 mg at 09/06/23 0841    nicotine polacrilex (NICORETTE) gum 2 mg, 2 mg, Mouth/Throat, Q2H PRN, France Gilbert MD, 2 mg at 09/06/23 1426    ondansetron ODT (ZOFRAN-ODT) disintegrating tablet 4 mg, 4 mg, Oral, Q6H PRN, France Gilbert MD    pantoprazole (PROTONIX) EC tablet 40 mg, 40 mg, Oral, Nightly, Jose Crocketta A, APRN, 40 mg at 09/05/23 2026    pramipexole (MIRAPEX) tablet 2 mg, 2 mg, Oral, Nightly, France Gilbert MD, 2 mg at 09/05/23 2026    prazosin (MINIPRESS) capsule 1 mg, 1 mg, Oral, BID, France Gilbert MD, 1 mg at 09/06/23 0839    rivaroxaban (XARELTO) tablet 20 mg, 20 mg, Oral, Daily With Dinner, Bon Purnima A, APRN, 20 mg at 09/05/23 1711    rosuvastatin (CRESTOR) tablet 40 mg, 40 mg, Oral, Daily, France Gilbert MD, 40 mg at 09/06/23 0840    ticagrelor (BRILINTA) tablet 90 mg, 90 mg, Oral, Q12H, Bon, Purnima A, APRN, 90 mg at 09/06/23 0841    traZODone (DESYREL) tablet 150 mg, 150 mg, Oral, Nightly PRN, France Gilbert MD    traZODone (DESYREL) tablet 150 mg, 150 mg, Oral, Nightly, France Gilbert MD, 150 mg at 09/05/23 2026    vitamin B-12 (CYANOCOBALAMIN) tablet 1,000 mcg, 1,000 mcg, Oral, Daily, France Gilbert MD, 1,000 mcg at 09/06/23 0841    Diagnoses/Assessment:     Major depression    Factor II deficiency    Class 3 severe obesity due to excess calories with serious comorbidity and body mass index (BMI) of 60.0 to 69.9 in adult    Coronary artery disease of native  artery with stable angina pectoris    Essential hypertension    Type 2 diabetes mellitus with hyperglycemia, with long-term current use of insulin    A-fib    Post traumatic stress disorder (PTSD)      Treatment Plan:    1) Will continue care for the patient on the behavioral health unit at [facility] to ensure patient safety.  2) Will continue to provide treatment with the unit milieu, activities, therapies and psychopharmacological management.  3) Patient to be placed on or continued on  Q15 minute checks  and Suicide precautions.  4) Pertinent medical issues:   --Low vit D and b-12: supplement.  --A-Fib: diltiazem, xarelto and brilinta  --Restless leg/neuropathy: mirapex and gabapentin  --Diabetes: insulin long acting and SSI, tradjenta  --HTN: lisinopril, metoprolol  --HLD: fenofibrate and statin  --GERD: PPI  5) Will order following labs: none  6) Will make the following medication changes:   PTSD & Depression:   --Cont Buspar 10mg tid   --Cont Lexapro 10mg daily  --Cont Prazosin 1mg bid  --Cont trazodone 150mg qhs and 150mg qhs PRN as he was on trazodone 300mg qhs for many years until stopped a couple of months ago when switched to Restoril.  Given the dose it may have had benefit for depression.  7) Will continue discharge planning for patient: outpatient psychiatric care, outpatient medical care, safety planning and placement as appropriate.    Treatment plan and medication risks and benefits discussed with: Patient and treatment team    LALA Shafer  09/06/23 at 15:38 CDT

## 2023-09-06 NOTE — NURSING NOTE
"Behavior   Note any precipitants to event or behavior   Describe level and action of any aggressive behavior or speech and associated interventions.     Anxiety: Excess Worry and Decreased concentration  Depression: depressed mood  Pain  0  AVH   no  S/I   no  Plan  no  H/I   no  Plan  no    Affect   flat      Note: Patient was seen in the dining area, sitting at a table alone. Patient was anxious, cooperative with care. Patient attended groups. Patient denied SI/HI/AVH/Plan. Patient denied pain and discomfort. Patient voiced anxiety at \"8\", given Vistaril per order with relief. Patient was educated on Depression Signs and Symptoms and was given handout on Signs and Symptoms of Depression, Self Care and Recognizing Triggers. Patient was receptive to the information given. Patient is independent and can make needs known to staff. Will follow.      Intervention    PRN medication utilized:  yes - Vistaril    Instructed in medication usage and effects  Medications administered as ordered  Encouraged to verbalize needs      Response    Verbalized understanding   Did patient take medications as ordered yes   Did patient interact with assessment?  yes     Plan    Will monitor for safety  Will monitor every 15 minutes as ordered  Will evaluate and promote the plan of care        "

## 2023-09-06 NOTE — PLAN OF CARE
Goal Outcome Evaluation:  Plan of Care Reviewed With: patient  Patient Agreement with Plan of Care: agrees     Progress: improving  Outcome Evaluation: Patient has been calm, cooperative, and med compliant this shift. Patient has attended all groups. PRN Vistaril administered this morning and afternoon per patient request. No needs or concerns voiced at this time.

## 2023-09-06 NOTE — PLAN OF CARE
"  Problem: Adult Behavioral Health Plan of Care  Goal: Plan of Care Review  Outcome: Ongoing, Progressing  Flowsheets  Taken 9/6/2023 0453  Progress: no change  Plan of Care Reviewed With: patient  Patient Agreement with Plan of Care: agrees  Outcome Evaluation: Patient remains anxious, cooperative with care. Patient attended groups.  Patient voiced anxiety at \"8\", given Vistaril per order with relief.  Patient remains 1:1 with CPAP. Patient has slept 6.75 hours so far this shift. Patient is independent and can make needs known to staff. Will follow.  Taken 9/5/2023 1936  Plan of Care Reviewed With: patient  Patient Agreement with Plan of Care: agrees   Goal Outcome Evaluation:  Plan of Care Reviewed With: patient  Patient Agreement with Plan of Care: agrees     Progress: no change  Outcome Evaluation: Patient remains anxious, cooperative with care. Patient attended groups.  Patient voiced anxiety at \"8\", given Vistaril per order with relief.  Patient remains 1:1 with CPAP. Patient has slept 6.75 hours so far this shift. Patient is independent and can make needs known to staff. Will follow.         "

## 2023-09-06 NOTE — PROGRESS NOTES
[facility] Medicine   INPATIENT PROGRESS NOTE      Patient Name: Yordy Hansen  Date of Admission: 9/3/2023  Today's Date: 09/06/23  Length of Stay: 3  Primary Care Physician: Mami Perez APRN    Subjective   Chief Complaint: Denies any complaints  HPI   9/5/23 per nurse glucose in the 400 range.  Denies any current medical concerns or issues.    9/6/23 Blood glucose improved but remains elevated. Continuing to titrate levemir. Denies any current medical needs.     Review of Systems     All pertinent negatives and positives are as above. All other systems have been reviewed and are negative unless otherwise stated.     Objective    Temp:  [97.7 °F (36.5 °C)] 97.7 °F (36.5 °C)  Heart Rate:  [71-86] 86  Resp:  [18-20] 18  BP: ()/(54-73) 154/73    Physical Exam  Vitals reviewed.   Constitutional:       General: He is not in acute distress.     Appearance: He is obese.   HENT:      Head: Normocephalic.      Nose: Nose normal.      Mouth/Throat:      Pharynx: Oropharynx is clear.   Eyes:      Conjunctiva/sclera: Conjunctivae normal.   Cardiovascular:      Rate and Rhythm: Normal rate and regular rhythm.      Pulses: Normal pulses.      Heart sounds: Normal heart sounds.   Pulmonary:      Effort: Pulmonary effort is normal.      Breath sounds: Normal breath sounds.   Abdominal:      General: Bowel sounds are normal.   Musculoskeletal:         General: Normal range of motion.      Cervical back: Neck supple.   Skin:     General: Skin is warm.      Capillary Refill: Capillary refill takes less than 2 seconds.   Neurological:      Mental Status: He is alert and oriented to person, place, and time. Mental status is at baseline.   Psychiatric:         Thought Content: Thought content normal.           Results Review:  I have reviewed the labs, radiology results, and diagnostic studies.    Laboratory Data:   Results from last 7 days   Lab Units  09/03/23  1347   WBC 10*3/mm3 7.05   HEMOGLOBIN g/dL 14.7   HEMATOCRIT % 43.6   PLATELETS 10*3/mm3 196          Results from last 7 days   Lab Units 09/03/23  1347   SODIUM mmol/L 135*   POTASSIUM mmol/L 4.8   CHLORIDE mmol/L 99   CO2 mmol/L 26.0   BUN mg/dL 12   CREATININE mg/dL 0.67*   CALCIUM mg/dL 9.6   BILIRUBIN mg/dL 0.4   ALK PHOS U/L 66   ALT (SGPT) U/L 31   AST (SGOT) U/L 23   GLUCOSE mg/dL 414*         Culture Data:   No results found for: BLOODCX  No results found for: URINECX  No results found for: RESPCX  No results found for: WOUNDCX  No results found for: STOOLCX  No components found for: BODYFLD    Radiology Data:   Imaging Results (Last 24 Hours)       ** No results found for the last 24 hours. **            I have reviewed the patient's current medications.     Assessment/Plan     I have utilized all available immediate resources to obtain, update, or review the patient's current medications (including all prescriptions, over-the-counter products, herbals, cannabis/cannabidiol products, and vitamin/mineral/dietary (nutritional) supplements).      Active Hospital Problems    Diagnosis     **Major depression     Post traumatic stress disorder (PTSD)     A-fib     Essential hypertension     Type 2 diabetes mellitus with hyperglycemia, with long-term current use of insulin     Factor II deficiency     Coronary artery disease of native artery with stable angina pectoris     Class 3 severe obesity due to excess calories with serious comorbidity and body mass index (BMI) of 60.0 to 69.9 in adult    CAD  DMII with peripheral neuropathy  HTN  HLD  ADOLFO  Chronic PE on xarelto  Chronic back pain        Plan:      1.  Psychiatric management per primary team  2.  Medications reviewed  3. Continue: lisinopril, cardizem, metoprolol, tradjenta, brilinta, gabapentin at reduced dose. High dose SSI TID AC. Increase levemir to 40 units sc at bedtime   prandial insulin 10 units TIDAC    Medical Decision Making  Number  and Complexity of problems: multiple moderate    Conditions and Status:        Condition is unchanged.     MDM Data  External documents reviewed: None today  My EKG interpretation: None today  My  interpretation: None today  Tests considered but not ordered: None today       Discussed with: Patient, attending      Treatment Plan  As above    Care Planning  Shared decision making: With patient  Code status and discussions: full    Disposition  Social Determinants of Health that impact treatment or disposition: n/a       I confirmed that the patient's Advance Care Plan is present, code status is documented, or surrogate decision maker is listed in the patient's medical record.   ________________________________        Copied text in this note has been reviewed and is accurate as of 09/06/23       Electronically signed by LALA Ceballos, 09/06/23, 12:30 CDT.

## 2023-09-06 NOTE — PROGRESS NOTES
"Met with pt this morning. Pt states he is still \"feeling depressed.\" Pt states he believes the medicine is starting to help him and reports he is not seeing \"dead images of mom.\" Pt denies SI/HI/AVH. Pt engaged well in group this date. MSW provided pt with a Grief Workbook this date. Pt encouraged to attend all groups.   "

## 2023-09-06 NOTE — DISCHARGE INSTRUCTIONS
Grief Counseling/Group     [facility]   Bereavement Counseling  [address] [place] [address]  [phone], Contact April  Can provide Zoom or in person services  Support group also provided   M-F individual and will need to schedule  W 1030am-12pm Widows  W 5:30pm-7pm meal provided, support group  First Monday of every Month 530pm-7pm for Parents who have lost children to suicide  Free services offered     [facility]  Call Radha to schedule [phone]   Barrington Perez, Bereavement Counselor  Malik Fitch [facility]   [phone]  Support group Wednesdays     [facility]  [phone]  First Wednesday of the Month 10am  Grief Class offered as well, but must sign up     [facility]  Contact adams Reyes at [phone]               --Ensure that all mediations (including over the counter meds) are secured in a lock box and that you use a weekly pill planner.    --Ensure all weapons if present (including firearms) are secured (ideally in a gun safe or removed from home) and all ammo is secured and stored separately.  --Continue medications as currently prescribed.  --Continue follow-up with outpatient providers.  --Please contact our Behavioral Health Unit with any questions or concerns at [phone].  --Return to the ER with any suicidal or homicidal ideation, or worsening symptoms.    --The number for the National Suicide & Crisis Hotline is 988.  The National Crisis Text number is 988.  These may be contacted at any time, if needed.

## 2023-09-06 NOTE — PLAN OF CARE
Problem: Adult Behavioral Health Plan of Care  Goal: Optimized Coping Skills in Response to Life Stressors  Outcome: Ongoing, Progressing   Goal Outcome Evaluation:   Met with patient and completed Recreation Therapy Assessment.   Patient talks about being bored and lonely.  He does identify a few leisure interest but is not active.  We discussed ways to increase social interaction and become more active with leisure interest.  Will continue to encourage group participation and identification of coping skills.

## 2023-09-06 NOTE — PLAN OF CARE
Problem: Adult Behavioral Health Plan of Care  Goal: Plan of Care Review  Outcome: Ongoing, Progressing  Flowsheets  Taken 9/6/2023 1344  Progress: improving  Plan of Care Reviewed With: patient  Patient Agreement with Plan of Care: agrees  Outcome Evaluation: Pt showing some improvement. Pt engaged well in group, and asked for extra materials. Reports medication is working. Pt deines SI\HI\AVH  Taken 9/5/2023 1329  Consent Given to Review Plan with: Refused  Goal: Patient-Specific Goal (Individualization)  Outcome: Ongoing, Progressing  Flowsheets (Taken 9/5/2023 1329)  Patient Personal Strengths:   expressive of emotions   expressive of needs   resilient  Patient-Specific Goals (Include Timeframe): Pt to have NO SI\HI\AVH at this time and to verbalize 1-3 learned coping skills  Individualized Care Needs: Group, education, safety planning, aftercare, family session  Anxieties, Fears or Concerns: None voiced  Patient Vulnerabilities:   adverse childhood experience(s)   family/relationship conflict   housing insecurity   occupational insecurity   limited support system   limited social skills   food insecurity   lacks insight into illness   poor impulse control   poor physical health   substance abuse/addiction  Goal: Optimized Coping Skills in Response to Life Stressors  Outcome: Ongoing, Progressing  Flowsheets (Taken 9/6/2023 1344)  Optimized Coping Skills in Response to Life Stressors: making progress toward outcome  Intervention: Promote Effective Coping Strategies  Flowsheets (Taken 9/6/2023 1344)  Supportive Measures:   active listening utilized   verbalization of feelings encouraged   journaling promoted   goal-setting facilitated   self-care encouraged  Goal: Develops/Participates in Therapeutic [place] to Support Successful Transition  Outcome: Ongoing, Progressing  Flowsheets (Taken 9/6/2023 1344)  Develops/Participates in Therapeutic [place] to Support Successful Transition: making progress toward  outcome  Intervention: Foster Therapeutic [place]  Flowsheets (Taken 9/6/2023 1344)  Trust Relationship/Rapport:   care explained   choices provided   reassurance provided   thoughts/feelings acknowledged   emotional support provided   empathic listening provided   questions answered   questions encouraged  Intervention: Mutually Develop Transition Plan  Flowsheets  Taken 9/6/2023 1344  Transition Support: follow-up care discussed  Offered/Gave Vendor List: no  Taken 9/5/2023 1329  Outpatient/Agency/Support Group Needs:   outpatient medication management   outpatient counseling   outpatient psychiatric care (specify)   case management  Discharge Coordination/Progress: Kristin, Refer to Grief Counseling  Transportation Anticipated: car, drives self  Anticipated Discharge Disposition: home or self-care  Transportation Concerns: none  Current Discharge Risk:   physical impairment   psychiatric illness   substance use/abuse   lives alone   lack of support system/caregiver   financial support inadequate  Concerns to be Addressed:   adjustment to diagnosis/illness   care coordination/care conferences   cognitive/perceptual   compliance issue   coping/stress   decision-making   basic needs   grief and loss   discharge planning   medication   mental health   substance/tobacco abuse/use   suicidal  Readmission Within the Last 30 Days: no previous admission in last 30 days  Patient/Family Anticipated Services at Transition:      mental health services  Patient's Choice of Community Agency(s): Kristin  Patient/Family Anticipates Transition to: home     Problem: COPD (Chronic Obstructive Pulmonary Disease) Comorbidity  Goal: Maintenance of COPD Symptom Control  Intervention: Maintain COPD-Symptom Control  Recent Flowsheet Documentation  Taken 9/6/2023 1344 by Rosana Wolf MSW  Supportive Measures:   active listening utilized   verbalization of feelings encouraged   journaling promoted   goal-setting  facilitated   self-care encouraged     Problem: Pain Chronic (Persistent) (Comorbidity Management)  Goal: Acceptable Pain Control and Functional Ability  Intervention: Optimize Psychosocial Wellbeing  Recent Flowsheet Documentation  Taken 9/6/2023 1344 by Rosana Wolf MSW  Supportive Measures:   active listening utilized   verbalization of feelings encouraged   journaling promoted   goal-setting facilitated   self-care encouraged  Goal: Acceptable Pain Control and Functional Ability  Intervention: Optimize Psychosocial Wellbeing  Recent Flowsheet Documentation  Taken 9/6/2023 1344 by Rosana Wolf MSW  Supportive Measures:   active listening utilized   verbalization of feelings encouraged   journaling promoted   goal-setting facilitated   self-care encouraged     Problem: Mood Impairment (Anxiety Signs/Symptoms)  Goal: Improved Mood Symptoms (Anxiety Signs/Symptoms)  Intervention: Optimize Emotion and Mood  Recent Flowsheet Documentation  Taken 9/6/2023 1344 by Rosana Wolf MSW  Supportive Measures:   active listening utilized   verbalization of feelings encouraged   journaling promoted   goal-setting facilitated   self-care encouraged     Problem: Social, Occupational or Functional Impairment (Anxiety Signs/Symptoms)  Goal: Enhanced Social, Occupational or Functional Skills (Anxiety Signs/Symptoms)  Intervention: Promote Social, Occupational and Functional Ability  Recent Flowsheet Documentation  Taken 9/6/2023 1344 by Rosana Wolf MSW  Trust Relationship/Rapport:   care explained   choices provided   reassurance provided   thoughts/feelings acknowledged   emotional support provided   empathic listening provided   questions answered   questions encouraged     Problem: Decreased Participation/Engagement (Depressive Signs/Symptoms)  Goal: Increased Participation and Engagement (Depressive Signs/Symptoms)  Intervention: Facilitate Participation and Engagement  Recent Flowsheet Documentation  Taken 9/6/2023  1344 by Rosana Wolf MSW  Supportive Measures:   active listening utilized   verbalization of feelings encouraged   journaling promoted   goal-setting facilitated   self-care encouraged     Problem: Mood Impairment (Depressive Signs/Symptoms)  Goal: Improved Mood Symptoms (Depressive Signs/Symptoms)  Intervention: Promote Mood Improvement  Recent Flowsheet Documentation  Taken 9/6/2023 1344 by Rosana Wolf MSW  Supportive Measures:   active listening utilized   verbalization of feelings encouraged   journaling promoted   goal-setting facilitated   self-care encouraged     Problem: Social, Occupational or Functional Impairment (Depressive Signs/Symptoms)  Goal: Enhanced Social, Occupational or Functional Skills (Depressive Signs/Symptoms)  Intervention: Promote Social, Occupational and Functional Ability  Recent Flowsheet Documentation  Taken 9/6/2023 1344 by Rosana Wolf, PAGE  Trust Relationship/Rapport:   care explained   choices provided   reassurance provided   thoughts/feelings acknowledged   emotional support provided   empathic listening provided   questions answered   questions encouraged     Goal Outcome Evaluation:  Plan of Care Reviewed With: patient  Patient Agreement with Plan of Care: agrees  Consent Given to Review Plan with: Refused  Progress: improving  Outcome Evaluation: Pt showing some improvement. Pt engaged well in group, and asked for extra materials. Reports medication is working. Pt deines SI\HI\AVH

## 2023-09-06 NOTE — PROGRESS NOTES
Provided pt with the following this date.     Grief Counseling/Group    [facility]   Bereavement Counseling  [address] [place] [address]  [phone], Contact April  Can provide Zoom or in person services  Support group also provided   M-F individual and will need to schedule  W 1030am-12pm Widows  W 5:30pm-7pm meal provided, support group  First Monday of every Month 530pm-7pm for Parents who have lost children to suicide  Free services offered    [facility]  Call Radha to schedule [phone]   Barrington Perez, Bereavement Counselor  Malik Fitch [facility]   [phone]  Support group Wednesdays    [facility]  [phone]  First Wednesday of the Month 10am  Grief Class offered as well, but must sign up    [facility]  Contact Pastor Elisa Reyes at [phone]

## 2023-09-06 NOTE — NURSING NOTE
"Behavior   Note any precipitants to event or behavior   Describe level and action of any aggressive behavior or speech and associated interventions.     Anxiety: Excess Worry  Depression: depressed mood  Pain  0  AVH   no  S/I   no  Plan  no  H/I   no  Plan  no    Affect   euthymic/normal      Note: patient seen in adult [facility] area individually. Denies SI/HI/AVH and pain. Patient stated that he felt anxious about \"everything\". Patient says that he sleeps better in the hospital than he does at home and he feels that it might be because \"it's where everything happened with my mom\". Denies having any trouble eating. PRN Vistaril requested and administered. No needs or concerns voiced at this time.       Intervention    PRN medication utilized:  no    Instructed in medication usage and effects  Medications administered as ordered  Encouraged to verbalize needs      Response    Verbalized understanding   Did patient take medications as ordered yes   Did patient interact with assessment?  yes     Plan    Will monitor for safety  Will monitor every 15 minutes as ordered  Will evaluate and promote the plan of care    Last BM:  unknown date  (Please chart in I/O as well)    "

## 2023-09-07 LAB
GLUCOSE BLDC GLUCOMTR-MCNC: 209 MG/DL (ref 70–130)
GLUCOSE BLDC GLUCOMTR-MCNC: 237 MG/DL (ref 70–130)
GLUCOSE BLDC GLUCOMTR-MCNC: 288 MG/DL (ref 70–130)
GLUCOSE BLDC GLUCOMTR-MCNC: 299 MG/DL (ref 70–130)

## 2023-09-07 PROCEDURE — 63710000001 INSULIN DETEMIR PER 5 UNITS: Performed by: NURSE PRACTITIONER

## 2023-09-07 PROCEDURE — 94799 UNLISTED PULMONARY SVC/PX: CPT

## 2023-09-07 PROCEDURE — 82948 REAGENT STRIP/BLOOD GLUCOSE: CPT

## 2023-09-07 PROCEDURE — 63710000001 INSULIN ASPART PER 5 UNITS: Performed by: NURSE PRACTITIONER

## 2023-09-07 RX ORDER — INSULIN ASPART 100 [IU]/ML
15 INJECTION, SOLUTION INTRAVENOUS; SUBCUTANEOUS
Status: DISCONTINUED | OUTPATIENT
Start: 2023-09-08 | End: 2023-09-10

## 2023-09-07 RX ADMIN — NICOTINE POLACRILEX 4 MG: 2 GUM, CHEWING BUCCAL at 20:04

## 2023-09-07 RX ADMIN — PRAMIPEXOLE DIHYDROCHLORIDE 2 MG: 1 TABLET ORAL at 20:04

## 2023-09-07 RX ADMIN — TICAGRELOR 90 MG: 90 TABLET ORAL at 20:03

## 2023-09-07 RX ADMIN — PRAZOSIN HYDROCHLORIDE 1 MG: 1 CAPSULE ORAL at 20:03

## 2023-09-07 RX ADMIN — GABAPENTIN 600 MG: 300 CAPSULE ORAL at 15:54

## 2023-09-07 RX ADMIN — BUSPIRONE HYDROCHLORIDE 10 MG: 10 TABLET ORAL at 08:08

## 2023-09-07 RX ADMIN — FENOFIBRATE 145 MG: 145 TABLET ORAL at 08:09

## 2023-09-07 RX ADMIN — HYDROXYZINE PAMOATE 50 MG: 50 CAPSULE ORAL at 18:48

## 2023-09-07 RX ADMIN — GABAPENTIN 600 MG: 300 CAPSULE ORAL at 08:08

## 2023-09-07 RX ADMIN — INSULIN ASPART 8 UNITS: 100 INJECTION, SOLUTION INTRAVENOUS; SUBCUTANEOUS at 08:15

## 2023-09-07 RX ADMIN — NICOTINE POLACRILEX 2 MG: 2 GUM, CHEWING BUCCAL at 08:19

## 2023-09-07 RX ADMIN — METOPROLOL SUCCINATE 50 MG: 50 TABLET, EXTENDED RELEASE ORAL at 08:09

## 2023-09-07 RX ADMIN — NICOTINE POLACRILEX 2 MG: 2 GUM, CHEWING BUCCAL at 14:25

## 2023-09-07 RX ADMIN — GABAPENTIN 600 MG: 300 CAPSULE ORAL at 20:03

## 2023-09-07 RX ADMIN — LINAGLIPTIN 5 MG: 5 TABLET, FILM COATED ORAL at 08:08

## 2023-09-07 RX ADMIN — INSULIN ASPART 10 UNITS: 100 INJECTION, SOLUTION INTRAVENOUS; SUBCUTANEOUS at 08:15

## 2023-09-07 RX ADMIN — RIVAROXABAN 20 MG: 10 TABLET, FILM COATED ORAL at 17:03

## 2023-09-07 RX ADMIN — LISINOPRIL 40 MG: 20 TABLET ORAL at 08:08

## 2023-09-07 RX ADMIN — ESCITALOPRAM OXALATE 10 MG: 10 TABLET ORAL at 08:09

## 2023-09-07 RX ADMIN — PRAZOSIN HYDROCHLORIDE 1 MG: 1 CAPSULE ORAL at 08:08

## 2023-09-07 RX ADMIN — ALBUTEROL SULFATE 2.5 MG: 2.5 SOLUTION RESPIRATORY (INHALATION) at 19:36

## 2023-09-07 RX ADMIN — ROSUVASTATIN CALCIUM 40 MG: 10 TABLET, FILM COATED ORAL at 08:08

## 2023-09-07 RX ADMIN — HYDROXYZINE PAMOATE 50 MG: 50 CAPSULE ORAL at 14:26

## 2023-09-07 RX ADMIN — PANTOPRAZOLE SODIUM 40 MG: 40 TABLET, DELAYED RELEASE ORAL at 20:03

## 2023-09-07 RX ADMIN — BUSPIRONE HYDROCHLORIDE 10 MG: 10 TABLET ORAL at 20:05

## 2023-09-07 RX ADMIN — NICOTINE POLACRILEX 2 MG: 2 GUM, CHEWING BUCCAL at 15:55

## 2023-09-07 RX ADMIN — INSULIN ASPART 10 UNITS: 100 INJECTION, SOLUTION INTRAVENOUS; SUBCUTANEOUS at 12:34

## 2023-09-07 RX ADMIN — INSULIN ASPART 10 UNITS: 100 INJECTION, SOLUTION INTRAVENOUS; SUBCUTANEOUS at 17:05

## 2023-09-07 RX ADMIN — CYANOCOBALAMIN TAB 1000 MCG 1000 MCG: 1000 TAB at 08:09

## 2023-09-07 RX ADMIN — TICAGRELOR 90 MG: 90 TABLET ORAL at 08:09

## 2023-09-07 RX ADMIN — TRAZODONE HYDROCHLORIDE 150 MG: 150 TABLET ORAL at 21:00

## 2023-09-07 RX ADMIN — NICOTINE POLACRILEX 2 MG: 2 GUM, CHEWING BUCCAL at 12:37

## 2023-09-07 RX ADMIN — INSULIN DETEMIR 45 UNITS: 100 INJECTION, SOLUTION SUBCUTANEOUS at 20:05

## 2023-09-07 RX ADMIN — BUSPIRONE HYDROCHLORIDE 10 MG: 10 TABLET ORAL at 15:54

## 2023-09-07 RX ADMIN — DILTIAZEM HYDROCHLORIDE 120 MG: 120 CAPSULE, COATED, EXTENDED RELEASE ORAL at 08:09

## 2023-09-07 RX ADMIN — INSULIN ASPART 12 UNITS: 100 INJECTION, SOLUTION INTRAVENOUS; SUBCUTANEOUS at 12:33

## 2023-09-07 RX ADMIN — INSULIN ASPART 12 UNITS: 100 INJECTION, SOLUTION INTRAVENOUS; SUBCUTANEOUS at 17:04

## 2023-09-07 NOTE — PROGRESS NOTES
[facility] Medicine Services  INPATIENT PROGRESS NOTE    Length of Stay: 4  Date of Admission: 9/3/2023  Primary Care Physician: Mami Perez APRN    Subjective   Chief Complaint: No medical complaints    HPI:  Patient is frustrated today about his blood sugar.  He states he has not had insulin at home for 2 months.  He has an insulin pump but states his primary care was unable to prescribe his insulin for it.  He had a follow up with endocrinology yesterday but was admitted on U.      Patient is currently on:  40 units nightly Levemir  10 units TID with meals Novolog  High dose SSI    AM glucose 209, with elevation up to 299 this afternoon.     Review of Systems   Constitutional:  Negative for activity change and fatigue.   HENT:  Negative for ear pain and sore throat.    Eyes:  Negative for pain and discharge.   Respiratory:  Negative for cough and shortness of breath.    Cardiovascular:  Negative for chest pain and palpitations.   Gastrointestinal:  Negative for abdominal pain and nausea.   Endocrine: Negative for cold intolerance and heat intolerance.   Genitourinary:  Negative for difficulty urinating and dysuria.   Musculoskeletal:  Negative for arthralgias and gait problem.   Skin:  Negative for color change and rash.   Neurological:  Negative for dizziness and weakness.   Psychiatric/Behavioral:  Negative for agitation and confusion.         Objective    Temp:  [97.8 °F (36.6 °C)-98 °F (36.7 °C)] 97.8 °F (36.6 °C)  Heart Rate:  [71-73] 73  Resp:  [18-20] 20  BP: (109-119)/(61-64) 109/61    Physical Exam  Constitutional:       Appearance: He is well-developed.   HENT:      Head: Normocephalic and atraumatic.   Eyes:      Pupils: Pupils are equal, round, and reactive to light.   Cardiovascular:      Rate and Rhythm: Normal rate and regular rhythm.   Pulmonary:      Effort: Pulmonary effort is normal.      Breath sounds: Normal breath sounds.   Abdominal:      General: Bowel sounds are  normal.      Palpations: Abdomen is soft.   Musculoskeletal:         General: Normal range of motion.      Cervical back: Normal range of motion and neck supple.   Skin:     General: Skin is warm and dry.   Neurological:      Mental Status: He is alert and oriented to person, place, and time.   Psychiatric:         Behavior: Behavior normal.     Results Review:  I have reviewed the labs, radiology results, and diagnostic studies.    Laboratory Data:   Results from last 7 days   Lab Units 09/03/23  1347   SODIUM mmol/L 135*   POTASSIUM mmol/L 4.8   CHLORIDE mmol/L 99   CO2 mmol/L 26.0   BUN mg/dL 12   CREATININE mg/dL 0.67*   GLUCOSE mg/dL 414*   CALCIUM mg/dL 9.6   BILIRUBIN mg/dL 0.4   ALK PHOS U/L 66   ALT (SGPT) U/L 31   AST (SGOT) U/L 23   ANION GAP mmol/L 10.0     Estimated Creatinine Clearance: 250.7 mL/min (A) (by C-G formula based on SCr of 0.67 mg/dL (L)).          Results from last 7 days   Lab Units 09/03/23  1347   WBC 10*3/mm3 7.05   HEMOGLOBIN g/dL 14.7   HEMATOCRIT % 43.6   PLATELETS 10*3/mm3 196           Culture Data:   No results found for: BLOODCX  No results found for: URINECX  No results found for: RESPCX  No results found for: WOUNDCX  No results found for: STOOLCX  No components found for: BODYFLD    Radiology Data:   Imaging Results (Last 24 Hours)       ** No results found for the last 24 hours. **            I have reviewed the patient's current medications.     Assessment/Plan     Active Hospital Problems    Diagnosis     **Major depression     Post traumatic stress disorder (PTSD)     A-fib     Essential hypertension     Type 2 diabetes mellitus with hyperglycemia, with long-term current use of insulin     Factor II deficiency     Coronary artery disease of native artery with stable angina pectoris     Class 3 severe obesity due to excess calories with serious comorbidity and body mass index (BMI) of 60.0 to 69.9 in adult        Plan:    Diabetes mellitus, type II, uncontrolled:  Will  increase Novolog to 15 units TID and add 10 units of Levemir in the am.  Nightly Levemir increased to 45 units.   Will contact endocrinology in the am to reschedule appointment.              This document has been electronically signed by LALA Carvajal on September 7, 2023 17:19 CDT

## 2023-09-07 NOTE — NURSING NOTE
Behavior   Note any precipitants to event or behavior   Describe level and action of any aggressive behavior or speech and associated interventions.     Anxiety: Patient denies at this time  Depression: Patient denies at this time  Pain  0  AVH   no  S/I   no  Plan  no  H/I   no  Plan  no    Affect   euthymic/normal      Note: Patient denies SI/HI/AVH, anxiety, depression, and pain at this time.  Patient states he feels the medications he is taking helps and his sleep has improved since admission.  Patient has been med compliant, and calm and cooperative.       Intervention    PRN medication utilized:  no    Instructed in medication usage and effects  Medications administered as ordered  Encouraged to verbalize needs      Response    Verbalized understanding   Did patient take medications as ordered yes   Did patient interact with assessment?  yes     Plan    Will monitor for safety  Will monitor every 15 minutes as ordered  Will evaluate and promote the plan of care    Last BM:  09/06/23  (Please chart in I/O as well)

## 2023-09-07 NOTE — PLAN OF CARE
Goal Outcome Evaluation:  Plan of Care Reviewed With: patient  Patient Agreement with Plan of Care: agrees     Progress: improving  Outcome Evaluation: Patient has been calm and cooperative this shift and voices how he feels he sleeps better here than at his mothers house.  Patient attended groups and interacted with peers appropriately.  Patient has slept 4.25 hours so far this shift.

## 2023-09-07 NOTE — PROGRESS NOTES
Psychiatry Progress Note    9/7/2023    Legal Status: Voluntary    Chief Complaint:  severe depression    Subjective:  Patient is a 42 y.o. male who was hospitalized for severe depression.     Patient is seen in the common area of the adult unit. Patient states his mood is improving. He states he no longer feels depressed. He reports concern that he has not had any thoughts about his mother and inquires why that may be. Discussed his current living arrangements in his mother's home and that he is in a different place without constant reminders of his mother. Patient states he needs to move back to his trailer. He states he wants to have positive memories of his mother.    Patient denies SI/HI/AVH. Patient is compliant with medications. Patient denies issue with sleep or appetite. Patient attends group sessions.     Patient reports medications are effective.    Objective     Vital Signs    Vitals:    09/06/23 0811 09/06/23 1430 09/06/23 1900 09/07/23 0936   BP: 154/73 127/58 119/64 109/61   BP Location: Other (Comment) Left arm Right arm Right arm   Patient Position: Sitting Sitting Sitting Sitting   Pulse: 86 76 71 73   Resp: 18 18 18 20   Temp: 97.7 °F (36.5 °C)  98 °F (36.7 °C) 97.8 °F (36.6 °C)   TempSrc: Temporal  Tympanic Temporal   SpO2: 95% 96% 96% 94%   Weight:       Height:           Physical Exam: as of today, 09/07/23   General Appearance: alert, appears stated age, and cooperative,  Hygiene:   fair  Gait & Station: Normal  Musculoskeletal: No tremors or abnormal involuntary movements    Mental Status Exam: as of today, 09/07/23   Cooperation:  Cooperative  Eye Contact:  Fair  Psychomotor Behavior:  Appropriate  Mood: Improving  Affect:  mood-congruent  Speech:  Normal  Thought Process:  Coherent  Associations: Goal Directed  Thought Content:                Mood congruent              Suicidal:   Denies              Homicidal:  None              Hallucinations:  None              Delusion:   None  Cognitive Functioning:              Consciousness: awake, alert, and oriented  Reliability:   adequate  Insight:  Fair  Judgement:  Fair  Impulse Control:  Fair    Lab Results: Results source: EMR   Lab Results (last 24 hours)       Procedure Component Value Units Date/Time    POC Glucose Once [[id number]]  (Abnormal) Collected: 09/07/23 1625    Specimen: Blood Updated: 09/07/23 1644     Glucose 299 mg/dL      Comment: RN NotifiedOperator: [id number] PEPPER DORAMeter ID: [id number]       POC Glucose Once [[id number]]  (Abnormal) Collected: 09/07/23 1126    Specimen: Blood Updated: 09/07/23 1145     Glucose 288 mg/dL      Comment: RN NotifiedOperator: [id number] ALYSE DORAMeter ID: [id number]       POC Glucose Once [[id number]]  (Abnormal) Collected: 09/07/23 0619    Specimen: Blood Updated: 09/07/23 0632     Glucose 209 mg/dL      Comment: RN NotifiedOperator: [id number] LEWIS GUEVARAAMeter ID: [id number]       POC Glucose Once [[id number]]  (Abnormal) Collected: 09/06/23 2003    Specimen: Blood Updated: 09/06/23 2058     Glucose 216 mg/dL      Comment: RN NotifiedOperator: [id number] RA ZHANGMeter ID: [id number]               Radiology Results:  Imaging Results (Last 24 Hours)       ** No results found for the last 24 hours. **            Medicine:   Current Facility-Administered Medications:     acetaminophen (TYLENOL) tablet 650 mg, 650 mg, Oral, Q4H PRN, France Gilbert MD, 650 mg at 09/05/23 1325    albuterol (PROVENTIL) nebulizer solution 0.083% 2.5 mg/3mL, 2.5 mg, Nebulization, Q6H PRN, Purnima Crockett APRN    aluminum-magnesium hydroxide-simethicone (MAALOX MAX) 400-400-40 MG/5ML suspension 15 mL, 15 mL, Oral, Q6H PRN, France Gilbert MD    busPIRone (BUSPAR) tablet 10 mg, 10 mg, Oral, TID, France Gilbert MD, 10 mg at 09/07/23 1554    cholecalciferol (VITAMIN D3) capsule 50,000 Units, 50,000 Units, Oral, Q7 Days, Holly York, APRN, 50,000 Units at 09/05/23 1333    cloNIDine  (CATAPRES) tablet 0.1 mg, 0.1 mg, Oral, Q4H PRN, France Gilbert MD, 0.1 mg at 09/04/23 0804    dextrose (D50W) (25 g/50 mL) IV injection 25 g, 25 g, Intravenous, Q15 Min PRN, France Gilbert MD    dextrose (GLUTOSE) oral gel 15 g, 15 g, Oral, Q15 Min PRN, Purnima Crockett A, APRN    dilTIAZem CD (CARDIZEM CD) 24 hr capsule 120 mg, 120 mg, Oral, Daily, Jose Crocketta A, APRN, 120 mg at 09/07/23 0809    [COMPLETED] escitalopram (LEXAPRO) tablet 5 mg, 5 mg, Oral, Daily, 5 mg at 09/04/23 1306 **FOLLOWED BY** escitalopram (LEXAPRO) tablet 10 mg, 10 mg, Oral, Daily, France Gilbert MD, 10 mg at 09/07/23 0809    fenofibrate (TRICOR) tablet 145 mg, 145 mg, Oral, Daily, Jose Crocketta A, APRN, 145 mg at 09/07/23 0809    gabapentin (NEURONTIN) capsule 600 mg, 600 mg, Oral, TID, France Gilbert MD, 600 mg at 09/07/23 1554    glucagon HCl (Diagnostic) injection 1 mg, 1 mg, Intramuscular, Q15 Min PRN, France Gilbert MD    hydrOXYzine pamoate (VISTARIL) capsule 50 mg, 50 mg, Oral, Q6H PRN, France Gilbert MD, 50 mg at 09/07/23 1426    Insulin Aspart (novoLOG) injection 0-24 Units, 0-24 Units, Subcutaneous, TID AC, Jose Crocketta A APRN, 12 Units at 09/07/23 1704    [START ON 9/8/2023] Insulin Aspart (novoLOG) injection 15 Units, 15 Units, Subcutaneous, TID With Meals, Levill, Lavonne G, APRN    [START ON 9/8/2023] insulin detemir (LEVEMIR) injection 10 Units, 10 Units, Subcutaneous, Daily, Levill, Lavonne G, APRN    insulin detemir (LEVEMIR) injection 45 Units, 45 Units, Subcutaneous, Nightly, Lavonne Knight G, APRN    linagliptin (TRADJENTA) tablet 5 mg, 5 mg, Oral, Daily, Bon, Purnima A, APRN, 5 mg at 09/07/23 0808    lisinopril (PRINIVIL,ZESTRIL) tablet 40 mg, 40 mg, Oral, Daily, Bon, Purnima A, APRN, 40 mg at 09/07/23 0808    loperamide (IMODIUM) capsule 2 mg, 2 mg, Oral, Q2H PRN, France Gilbert MD    magnesium hydroxide (MILK OF MAGNESIA) suspension 10 mL, 10 mL, Oral, Daily PRN, Ofelia,  MD France    metoprolol succinate XL (TOPROL-XL) 24 hr tablet 50 mg, 50 mg, Oral, Daily, Purnima Crockett, APRN, 50 mg at 09/07/23 0809    nicotine polacrilex (NICORETTE) gum 4 mg, 4 mg, Mouth/Throat, Q2H PRN, Mariusz Boone II, MD    ondansetron ODT (ZOFRAN-ODT) disintegrating tablet 4 mg, 4 mg, Oral, Q6H PRN, France Gilbert MD    pantoprazole (PROTONIX) EC tablet 40 mg, 40 mg, Oral, Nightly, Purnima Crockett, APRN, 40 mg at 09/06/23 2032    pramipexole (MIRAPEX) tablet 2 mg, 2 mg, Oral, Nightly, France Gilbert MD, 2 mg at 09/06/23 2032    prazosin (MINIPRESS) capsule 1 mg, 1 mg, Oral, BID, France Gilbert MD, 1 mg at 09/07/23 0808    rivaroxaban (XARELTO) tablet 20 mg, 20 mg, Oral, Daily With Dinner, Purnima Crockett, APRN, 20 mg at 09/07/23 1703    rosuvastatin (CRESTOR) tablet 40 mg, 40 mg, Oral, Daily, France Gilbert MD, 40 mg at 09/07/23 0808    ticagrelor (BRILINTA) tablet 90 mg, 90 mg, Oral, Q12H, Purnima Crockett, APRN, 90 mg at 09/07/23 0809    traZODone (DESYREL) tablet 150 mg, 150 mg, Oral, Nightly PRN, France Gilbert MD    traZODone (DESYREL) tablet 150 mg, 150 mg, Oral, Nightly, France Gilbert MD, 150 mg at 09/06/23 2032    vitamin B-12 (CYANOCOBALAMIN) tablet 1,000 mcg, 1,000 mcg, Oral, Daily, France Gilbert MD, 1,000 mcg at 09/07/23 0809    Diagnoses/Assessment:     Major depression    Factor II deficiency    Class 3 severe obesity due to excess calories with serious comorbidity and body mass index (BMI) of 60.0 to 69.9 in adult    Coronary artery disease of native artery with stable angina pectoris    Essential hypertension    Type 2 diabetes mellitus with hyperglycemia, with long-term current use of insulin    A-fib    Post traumatic stress disorder (PTSD)      Treatment Plan:    1) Will continue care for the patient on the behavioral health unit at [facility] to ensure patient safety.  2) Will continue to provide treatment with the unit  milieu, activities, therapies and psychopharmacological management.  3) Patient to be placed on or continued on  Q15 minute checks  and Suicide precautions.  4) Pertinent medical issues:   --Low vit D and b-12: supplement.  --A-Fib: diltiazem, xarelto and brilinta  --Restless leg/neuropathy: mirapex and gabapentin  --Diabetes: insulin long acting and SSI, tradjenta  --HTN: lisinopril, metoprolol  --HLD: fenofibrate and statin  --GERD: PPI  5) Will order following labs: none  6) Will make the following medication changes:   PTSD & Depression:   --Cont Buspar 10mg tid   --Increase Lexapro to 15mg daily  --Cont Prazosin 1mg bid  --Cont trazodone 150mg qhs and 150mg qhs PRN as he was on trazodone 300mg qhs for many years until stopped a couple of months ago when switched to Restoril.  Given the dose it may have had benefit for depression.  7) Will continue discharge planning for patient: outpatient psychiatric care, outpatient medical care, safety planning and placement as appropriate.     Treatment plan and medication risks and benefits discussed with: Patient and treatment team       Holly York, LALA  09/07/23 at 17:31 CDT

## 2023-09-07 NOTE — NURSING NOTE
Behavior   Note any precipitants to event or behavior   Describe level and action of any aggressive behavior or speech and associated interventions.     Anxiety: Excess Worry  Depression: depressed mood  Pain  0  AVH   no  S/I   no  Plan  no  H/I   no  Plan  no    Affect   euthymic/normal      Note: Patient is smiling and cheerful and interacting well with staff and peers. Patient denies SI and reports improvement in mood since discharge. Patient asking opinions on whether he should move out of his mother's house or stay. Patient encouraged to focus on happy memories of his mother.       Intervention    PRN medication utilized:  yes - nicotine gum    Instructed in medication usage and effects  Medications administered as ordered  Encouraged to verbalize needs      Response    Verbalized understanding   Did patient take medications as ordered yes  Did patient interact with assessment?  yes     Plan    Will monitor for safety  Will monitor every 15 minutes as ordered  Will evaluate and promote the plan of care    Last BM:  unknown date  (Please chart in I/O as well)

## 2023-09-07 NOTE — PROGRESS NOTES
"Met with pt this morning before treatment team. Pt reprots he worked on grief workbook MSW provided him. He states \"this has really helped me.\" Pt also voiced frustration regarding other peer on unit. Pt states other peer \"is making me so angry and will not stop.\" Pt and MSW discussed coping skills to use when anger emerges. Pt denies any SI\HI\AVH at this time.   "

## 2023-09-07 NOTE — PLAN OF CARE
Goal Outcome Evaluation:  Plan of Care Reviewed With: patient  Patient Agreement with Plan of Care: agrees     Progress: improving  Outcome Evaluation: Patient reports improvement in mood since admission, patient had prn vistaril for anxiety, patient is medication compliant, prn breathing treatment for SOB, patient reports fair sleep through night

## 2023-09-08 LAB
GLUCOSE BLDC GLUCOMTR-MCNC: 182 MG/DL (ref 70–130)
GLUCOSE BLDC GLUCOMTR-MCNC: 267 MG/DL (ref 70–130)
GLUCOSE BLDC GLUCOMTR-MCNC: 291 MG/DL (ref 70–130)

## 2023-09-08 PROCEDURE — 63710000001 INSULIN ASPART PER 5 UNITS: Performed by: NURSE PRACTITIONER

## 2023-09-08 PROCEDURE — 63710000001 INSULIN DETEMIR PER 5 UNITS: Performed by: NURSE PRACTITIONER

## 2023-09-08 PROCEDURE — 82948 REAGENT STRIP/BLOOD GLUCOSE: CPT

## 2023-09-08 RX ADMIN — INSULIN DETEMIR 45 UNITS: 100 INJECTION, SOLUTION SUBCUTANEOUS at 20:29

## 2023-09-08 RX ADMIN — TRAZODONE HYDROCHLORIDE 150 MG: 150 TABLET ORAL at 21:56

## 2023-09-08 RX ADMIN — CYANOCOBALAMIN TAB 1000 MCG 1000 MCG: 1000 TAB at 08:08

## 2023-09-08 RX ADMIN — HYDROXYZINE PAMOATE 50 MG: 50 CAPSULE ORAL at 03:18

## 2023-09-08 RX ADMIN — DILTIAZEM HYDROCHLORIDE 120 MG: 120 CAPSULE, COATED, EXTENDED RELEASE ORAL at 08:11

## 2023-09-08 RX ADMIN — RIVAROXABAN 20 MG: 10 TABLET, FILM COATED ORAL at 17:09

## 2023-09-08 RX ADMIN — GABAPENTIN 600 MG: 300 CAPSULE ORAL at 20:28

## 2023-09-08 RX ADMIN — PRAMIPEXOLE DIHYDROCHLORIDE 2 MG: 1 TABLET ORAL at 20:28

## 2023-09-08 RX ADMIN — INSULIN ASPART 12 UNITS: 100 INJECTION, SOLUTION INTRAVENOUS; SUBCUTANEOUS at 12:22

## 2023-09-08 RX ADMIN — TICAGRELOR 90 MG: 90 TABLET ORAL at 20:29

## 2023-09-08 RX ADMIN — PANTOPRAZOLE SODIUM 40 MG: 40 TABLET, DELAYED RELEASE ORAL at 20:28

## 2023-09-08 RX ADMIN — PRAZOSIN HYDROCHLORIDE 1 MG: 1 CAPSULE ORAL at 08:09

## 2023-09-08 RX ADMIN — ROSUVASTATIN CALCIUM 40 MG: 10 TABLET, FILM COATED ORAL at 08:09

## 2023-09-08 RX ADMIN — TICAGRELOR 90 MG: 90 TABLET ORAL at 08:11

## 2023-09-08 RX ADMIN — GABAPENTIN 600 MG: 300 CAPSULE ORAL at 16:07

## 2023-09-08 RX ADMIN — INSULIN DETEMIR 10 UNITS: 100 INJECTION, SOLUTION SUBCUTANEOUS at 08:14

## 2023-09-08 RX ADMIN — INSULIN ASPART 4 UNITS: 100 INJECTION, SOLUTION INTRAVENOUS; SUBCUTANEOUS at 07:41

## 2023-09-08 RX ADMIN — GABAPENTIN 600 MG: 300 CAPSULE ORAL at 08:07

## 2023-09-08 RX ADMIN — TRAZODONE HYDROCHLORIDE 150 MG: 150 TABLET ORAL at 20:28

## 2023-09-08 RX ADMIN — INSULIN ASPART 15 UNITS: 100 INJECTION, SOLUTION INTRAVENOUS; SUBCUTANEOUS at 12:21

## 2023-09-08 RX ADMIN — LISINOPRIL 40 MG: 20 TABLET ORAL at 08:05

## 2023-09-08 RX ADMIN — NICOTINE POLACRILEX 4 MG: 2 GUM, CHEWING BUCCAL at 20:02

## 2023-09-08 RX ADMIN — METOPROLOL SUCCINATE 50 MG: 50 TABLET, EXTENDED RELEASE ORAL at 08:06

## 2023-09-08 RX ADMIN — INSULIN ASPART 15 UNITS: 100 INJECTION, SOLUTION INTRAVENOUS; SUBCUTANEOUS at 17:07

## 2023-09-08 RX ADMIN — FENOFIBRATE 145 MG: 145 TABLET ORAL at 08:11

## 2023-09-08 RX ADMIN — BUSPIRONE HYDROCHLORIDE 10 MG: 10 TABLET ORAL at 20:28

## 2023-09-08 RX ADMIN — BUSPIRONE HYDROCHLORIDE 10 MG: 10 TABLET ORAL at 16:06

## 2023-09-08 RX ADMIN — INSULIN ASPART 15 UNITS: 100 INJECTION, SOLUTION INTRAVENOUS; SUBCUTANEOUS at 07:40

## 2023-09-08 RX ADMIN — BUSPIRONE HYDROCHLORIDE 10 MG: 10 TABLET ORAL at 08:08

## 2023-09-08 RX ADMIN — PRAZOSIN HYDROCHLORIDE 1 MG: 1 CAPSULE ORAL at 20:28

## 2023-09-08 RX ADMIN — NICOTINE POLACRILEX 4 MG: 2 GUM, CHEWING BUCCAL at 08:04

## 2023-09-08 RX ADMIN — HYDROXYZINE PAMOATE 50 MG: 50 CAPSULE ORAL at 17:33

## 2023-09-08 RX ADMIN — INSULIN ASPART 12 UNITS: 100 INJECTION, SOLUTION INTRAVENOUS; SUBCUTANEOUS at 17:07

## 2023-09-08 RX ADMIN — LINAGLIPTIN 5 MG: 5 TABLET, FILM COATED ORAL at 08:10

## 2023-09-08 RX ADMIN — ESCITALOPRAM 15 MG: 5 TABLET, FILM COATED ORAL at 08:07

## 2023-09-08 NOTE — NURSING NOTE
[facility]  Brief Intervention     The score of the AUDIT C, placed the patient into the moderate to high risk level.    The patient was given feedback on high-risk drinking problems of liver disease, heart disease, and depression.    The patient was encouraged to abstain from use.    Concern for the patient's unhealthy level and high risk alcohol use was shared with patient.     Arabella Roberts RN  9/8/2023  15:50 CDT

## 2023-09-08 NOTE — PROGRESS NOTES
[facility] Medicine Services  INPATIENT PROGRESS NOTE    Length of Stay: 5  Date of Admission: 9/3/2023  Primary Care Physician: Mami Perez APRN    Subjective   Chief Complaint: No medical complaints    HPI:  Patient is frustrated today about his blood sugar.  He states he has not had insulin at home for 2 months.  He has an insulin pump but states his primary care was unable to prescribe his insulin for it.  He had a follow up with endocrinology yesterday but was admitted on U.      Review of Systems   Constitutional:  Negative for activity change and fatigue.   HENT:  Negative for ear pain and sore throat.    Eyes:  Negative for pain and discharge.   Respiratory:  Negative for cough and shortness of breath.    Cardiovascular:  Negative for chest pain and palpitations.   Gastrointestinal:  Negative for abdominal pain and nausea.   Endocrine: Negative for cold intolerance and heat intolerance.   Genitourinary:  Negative for difficulty urinating and dysuria.   Musculoskeletal:  Negative for arthralgias and gait problem.   Skin:  Negative for color change and rash.   Neurological:  Negative for dizziness and weakness.   Psychiatric/Behavioral:  Negative for agitation and confusion.         Objective    Temp:  [97.9 °F (36.6 °C)-98 °F (36.7 °C)] 97.9 °F (36.6 °C)  Heart Rate:  [68-69] 68  Resp:  [18-20] 20  BP: (131-163)/(70-81) 163/70    Physical Exam  Constitutional:       Appearance: He is well-developed.   HENT:      Head: Normocephalic and atraumatic.   Eyes:      Pupils: Pupils are equal, round, and reactive to light.   Cardiovascular:      Rate and Rhythm: Normal rate and regular rhythm.   Pulmonary:      Effort: Pulmonary effort is normal.      Breath sounds: Normal breath sounds.   Abdominal:      General: Bowel sounds are normal.      Palpations: Abdomen is soft.   Musculoskeletal:         General: Normal range of motion.      Cervical back: Normal range of motion and neck supple.    Skin:     General: Skin is warm and dry.   Neurological:      Mental Status: He is alert and oriented to person, place, and time.   Psychiatric:         Behavior: Behavior normal.     Results Review:  I have reviewed the labs, radiology results, and diagnostic studies.    Laboratory Data:   Results from last 7 days   Lab Units 09/03/23  1347   SODIUM mmol/L 135*   POTASSIUM mmol/L 4.8   CHLORIDE mmol/L 99   CO2 mmol/L 26.0   BUN mg/dL 12   CREATININE mg/dL 0.67*   GLUCOSE mg/dL 414*   CALCIUM mg/dL 9.6   BILIRUBIN mg/dL 0.4   ALK PHOS U/L 66   ALT (SGPT) U/L 31   AST (SGOT) U/L 23   ANION GAP mmol/L 10.0       Estimated Creatinine Clearance: 250.7 mL/min (A) (by C-G formula based on SCr of 0.67 mg/dL (L)).          Results from last 7 days   Lab Units 09/03/23  1347   WBC 10*3/mm3 7.05   HEMOGLOBIN g/dL 14.7   HEMATOCRIT % 43.6   PLATELETS 10*3/mm3 196             Culture Data:   No results found for: BLOODCX  No results found for: URINECX  No results found for: RESPCX  No results found for: WOUNDCX  No results found for: STOOLCX  No components found for: BODYFLD    Radiology Data:   Imaging Results (Last 24 Hours)       ** No results found for the last 24 hours. **            I have reviewed the patient's current medications.     Assessment/Plan     Active Hospital Problems    Diagnosis     **Major depression     Post traumatic stress disorder (PTSD)     A-fib     Essential hypertension     Type 2 diabetes mellitus with hyperglycemia, with long-term current use of insulin     Factor II deficiency     Coronary artery disease of native artery with stable angina pectoris     Class 3 severe obesity due to excess calories with serious comorbidity and body mass index (BMI) of 60.0 to 69.9 in adult        Plan:    Diabetes mellitus, type II, uncontrolled:      Patient is currently on:  45 units nightly Levemir  15 units TID with meals Novolog  10 units am Levemir  High dose SSI    AM glucose 182.     Patient will be  rescheduled with endocrinology at discharge.         This document has been electronically signed by LALA Carvajal on September 8, 2023 10:00 CDT

## 2023-09-08 NOTE — PLAN OF CARE
Goal Outcome Evaluation:  Plan of Care Reviewed With: patient        Progress: improving  Outcome Evaluation: Patient has been pleasant and cooperative and out in common area most all day interacting with peers and staff.  No issues this shift.

## 2023-09-08 NOTE — NURSING NOTE
Behavior   Note any precipitants to event or behavior   Describe level and action of any aggressive behavior or speech and associated interventions.     Anxiety: Excess Worry  Depression: Patient denies at this time  Pain  0  AVH   no  S/I   no  Plan  no  H/I   no  Plan  no    Affect   euthymic/normal      Note: Pt calm and cooperative. Denies SI/HI/AVH. Compliant with scheduled medications and PRN. Participated in group. Interacted with peers and staff. Pt stated he was feeling much better. No other concerns voiced at this time.       Intervention    PRN medication utilized: Yes. Nicotine Gum    Instructed in medication usage and effects  Medications administered as ordered  Encouraged to verbalize needs      Response    Verbalized understanding   Did patient take medications as ordered yes   Did patient interact with assessment?  yes     Plan    Will monitor for safety  Will monitor every 15 minutes as ordered  Will evaluate and promote the plan of care    Last BM:  09/06/2023  (Please chart in I/O as well)

## 2023-09-08 NOTE — NURSING NOTE
Behavior   Note any precipitants to event or behavior   Describe level and action of any aggressive behavior or speech and associated interventions.     Anxiety: Excess Worry and Decreased concentration  Depression: depressed mood  Pain  0  AVH   no  S/I   no  Plan  no  H/I   no  Plan  no    Affect   euthymic/normal      Note:Patient was seen in common area alert and oriented.  Patient was pleasant and cooperative.  Patient took medication as ordered and patient participated in assessment.  Patient had no complaints at this time.  Will continue to monitor.      Intervention    PRN medication utilized:  no    Instructed in medication usage and effects  Medications administered as ordered  Encouraged to verbalize needs      Response    Verbalized understanding   Did patient take medications as ordered yes   Did patient interact with assessment?  yes     Plan    Will monitor for safety  Will monitor every 15 minutes as ordered  Will evaluate and promote the plan of care    Last BM:  unknown date  (Please chart in I/O as well)

## 2023-09-08 NOTE — CONSULTS
Adult Nutrition  Assessment/PES    Patient Name:  Yordy Hansen  YOB: 1980  MRN: [id number]  Admit Date:  9/3/2023    Assessment Date:  9/8/2023    Comments:  RD visit d/t LOS. Pt admit to [facility] r/t severe depression. Hx includes DM w/poor management d/t issues obtaining insulin---per med notes pt will have endocrinology appt at d/c. Pt reports good appetite at this time. Intakes 100% at meals. #. No nutrition concerns to address at this time.      Reason for Assessment       Row Name 09/08/23 1348          Reason for Assessment    Reason For Assessment per organizational policy  LOS     Diagnosis psychosocial                            Nutrition Prescription Ordered       Row Name 09/08/23 1348          Nutrition Prescription PO    Current PO Diet Regular     Fluid Consistency Thin     Common Modifiers Cardiac;Consistent Carbohydrate                    Evaluation of Received Nutrient/Fluid Intake       Row Name 09/08/23 1348          PO Evaluation    % PO Intake 100                       Problem/Interventions:   Problem 1       Row Name 09/08/23 1348          Nutrition Diagnoses Problem 1    Problem 1 Nutrition Appropriate for Condition at this Time     Etiology (related to) Medical Diagnosis     Psychosocial Depression     Signs/Symptoms (evidenced by) PO Intake     Percent (%) intake recorded 100 %                          Intervention Goal       Row Name 09/08/23 1349          Intervention Goal    General Meet nutritional needs for age/condition;Maintain nutrition     PO Meet estimated needs     Weight Appropriate weight loss                    Nutrition Intervention       Row Name 09/08/23 1345          Nutrition Intervention    RD/Tech Action Follow Tx progress;Care plan reviewd;Advise alternate selection                      Education/Evaluation       Row Name 09/08/23 1342          Monitor/Evaluation    Monitor Per protocol;PO intake;Pertinent labs;Weight;Skin status;Symptoms                      Electronically signed by:  Afia Chen RD  09/08/23 13:50 CDT

## 2023-09-08 NOTE — PLAN OF CARE
Goal Outcome Evaluation:  Plan of Care Reviewed With: patient  Patient Agreement with Plan of Care: agrees  Consent Given to Review Plan with: Refused  Progress: improving  Outcome Evaluation: Pt has been 1:1 since 2200 r/t C-Pap. Pt has slept 1.5 hrs so far this shift.

## 2023-09-08 NOTE — PROGRESS NOTES
"Psychiatry Progress Note    9/8/2023    Legal Status: Voluntary    Chief Complaint: severe depression     Subjective:  Patient is a 42 y.o. male who was hospitalized for severe depression .    Patient is seen individually on the adult unit. Patient states his mood is \"good\". Patient voices concern regarding colorful circles and an image of a man holding a gun when he closed his eyes and tried to fall asleep last night. Patient states he had difficulty falling asleep. Patient states the images were not visible while awake.     Patient states his father visited last night and the visit did not go well. Patient states his father told him he could not stay with him while he attempted to repair the pipes in his home. Patient states he is still upset with his father about trying to take furniture from his mother's home.     Patient states he plans to repair the busted pipes in his home once discharged so that he can move out of his mother's home. Patient states he has not had any visions or disturbing thoughts of his mother.     Patient denies SI/HI/AVH. Patient denies issue with appetite. Patient is compliant with medications. Patient engages with peers and attends group sessions.    Patient reports medications are tolerable.    Objective     Vital Signs    Vitals:    09/07/23 0936 09/07/23 1900 09/07/23 1936 09/08/23 0700   BP: 109/61 131/81  163/70   BP Location: Right arm Left arm  Right arm   Patient Position: Sitting Sitting  Sitting   Pulse: 73 68 69 68   Resp: 20 18 20 20   Temp: 97.8 °F (36.6 °C) 98 °F (36.7 °C)  97.9 °F (36.6 °C)   TempSrc: Temporal Tympanic  Temporal   SpO2: 94% 95% 98% 96%   Weight:       Height:           Physical Exam: as of today, 09/08/23   General Appearance: alert, appears stated age, and cooperative,  Hygiene:   fair  Gait & Station: Normal  Musculoskeletal: No tremors or abnormal involuntary movements    Mental Status Exam: as of today, 09/08/23   Cooperation:  Cooperative  Eye " Contact:  Fair  Psychomotor Behavior:  Appropriate  Mood: Improving  Affect:  mood-congruent  Speech:  Normal  Thought Process:  Coherent  Associations: Goal Directed  Thought Content:                Mood congruent              Suicidal:   Denies              Homicidal:  None              Hallucinations:  None              Delusion:  None  Cognitive Functioning:              Consciousness: awake, alert, and oriented  Reliability:   adequate  Insight:  Fair  Judgement:  Fair  Impulse Control:  Fair    Lab Results: Results source: EMR   Lab Results (last 24 hours)       Procedure Component Value Units Date/Time    POC Glucose Once [[id number]]  (Abnormal) Collected: 09/08/23 1105    Specimen: Blood Updated: 09/08/23 1118     Glucose 267 mg/dL      Comment: RN NotifiedOperator: [id number] RUTH SABRINAMeter ID: [id number]       POC Glucose Once [[id number]]  (Abnormal) Collected: 09/08/23 0610    Specimen: Blood Updated: 09/08/23 0622     Glucose 182 mg/dL      Comment: RN NotifiedOperator: [id number] JOSEPH PAXTONMeter ID: [id number]       POC Glucose Once [[id number]]  (Abnormal) Collected: 09/07/23 2001    Specimen: Blood Updated: 09/07/23 2015     Glucose 237 mg/dL      Comment: RN NotifiedOperator: [id number] SAUCEDO SHANNANZIAMeter ID: [id number]       POC Glucose Once [[id number]]  (Abnormal) Collected: 09/07/23 1625    Specimen: Blood Updated: 09/07/23 1644     Glucose 299 mg/dL      Comment: RN NotifiedOperator: [id number] ALYSE DORAMeter ID: [id number]               Radiology Results:  Imaging Results (Last 24 Hours)       ** No results found for the last 24 hours. **            Medicine:   Current Facility-Administered Medications:     acetaminophen (TYLENOL) tablet 650 mg, 650 mg, Oral, Q4H PRN, France Gilbert MD, 650 mg at 09/05/23 1325    albuterol (PROVENTIL) nebulizer solution 0.083% 2.5 mg/3mL, 2.5 mg, Nebulization, Q6H PRN, Purnima Crockett APRN, 2.5 mg at 09/07/23 1936    aluminum-magnesium  hydroxide-simethicone (MAALOX MAX) 400-400-40 MG/5ML suspension 15 mL, 15 mL, Oral, Q6H PRN, France Gilbert MD    busPIRone (BUSPAR) tablet 10 mg, 10 mg, Oral, TID, France Gilbert MD, 10 mg at 09/08/23 0808    cholecalciferol (VITAMIN D3) capsule 50,000 Units, 50,000 Units, Oral, Q7 Days, Holly York APRN, 50,000 Units at 09/05/23 1333    cloNIDine (CATAPRES) tablet 0.1 mg, 0.1 mg, Oral, Q4H PRN, France Gilbert MD, 0.1 mg at 09/04/23 0804    dextrose (D50W) (25 g/50 mL) IV injection 25 g, 25 g, Intravenous, Q15 Min PRN, France Gilbert MD    dextrose (GLUTOSE) oral gel 15 g, 15 g, Oral, Q15 Min PRN, Purnima Crockett APRN    dilTIAZem CD (CARDIZEM CD) 24 hr capsule 120 mg, 120 mg, Oral, Daily, Purnima Crockett, APRN, 120 mg at 09/08/23 0811    [COMPLETED] escitalopram (LEXAPRO) tablet 5 mg, 5 mg, Oral, Daily, 5 mg at 09/04/23 1306 **FOLLOWED BY** escitalopram (LEXAPRO) tablet 15 mg, 15 mg, Oral, Daily, Holly York APRN, 15 mg at 09/08/23 0807    fenofibrate (TRICOR) tablet 145 mg, 145 mg, Oral, Daily, Purnima Crockett, APRN, 145 mg at 09/08/23 0811    gabapentin (NEURONTIN) capsule 600 mg, 600 mg, Oral, TID, France Gilbert MD, 600 mg at 09/08/23 0807    glucagon HCl (Diagnostic) injection 1 mg, 1 mg, Intramuscular, Q15 Min PRN, France Gilbert MD    hydrOXYzine pamoate (VISTARIL) capsule 50 mg, 50 mg, Oral, Q6H PRN, France Gilbert MD, 50 mg at 09/08/23 0318    Insulin Aspart (novoLOG) injection 0-24 Units, 0-24 Units, Subcutaneous, TID AC, Purnima Crockett, APRN, 12 Units at 09/08/23 1222    Insulin Aspart (novoLOG) injection 15 Units, 15 Units, Subcutaneous, TID With Meals, Lavonne Knight APRN, 15 Units at 09/08/23 1221    insulin detemir (LEVEMIR) injection 10 Units, 10 Units, Subcutaneous, Daily, Lavonne Knight APRN, 10 Units at 09/08/23 0814    insulin detemir (LEVEMIR) injection 45 Units, 45 Units, Subcutaneous, Nightly, Lavonne Knight APRBRIANNE, 45 Units at  09/07/23 2005    linagliptin (TRADJENTA) tablet 5 mg, 5 mg, Oral, Daily, Crockett, Purnima A, APRN, 5 mg at 09/08/23 0810    lisinopril (PRINIVIL,ZESTRIL) tablet 40 mg, 40 mg, Oral, Daily, Crockett, Purnima A, APRN, 40 mg at 09/08/23 0805    loperamide (IMODIUM) capsule 2 mg, 2 mg, Oral, Q2H PRN, France Gilbert MD    magnesium hydroxide (MILK OF MAGNESIA) suspension 10 mL, 10 mL, Oral, Daily PRN, France Gilbert MD    metoprolol succinate XL (TOPROL-XL) 24 hr tablet 50 mg, 50 mg, Oral, Daily, Bon, Purnima A, APRN, 50 mg at 09/08/23 0806    nicotine polacrilex (NICORETTE) gum 4 mg, 4 mg, Mouth/Throat, Q2H PRN, Mariusz Boone II, MD, 4 mg at 09/08/23 0804    ondansetron ODT (ZOFRAN-ODT) disintegrating tablet 4 mg, 4 mg, Oral, Q6H PRN, France Gilbert MD    pantoprazole (PROTONIX) EC tablet 40 mg, 40 mg, Oral, Nightly, Jose Crocketta A, APRN, 40 mg at 09/07/23 2003    pramipexole (MIRAPEX) tablet 2 mg, 2 mg, Oral, Nightly, France Gilbert MD, 2 mg at 09/07/23 2004    prazosin (MINIPRESS) capsule 1 mg, 1 mg, Oral, BID, France Gilbert MD, 1 mg at 09/08/23 0809    rivaroxaban (XARELTO) tablet 20 mg, 20 mg, Oral, Daily With Dinner, Bon Purnima A, APRN, 20 mg at 09/07/23 1703    rosuvastatin (CRESTOR) tablet 40 mg, 40 mg, Oral, Daily, France Gilbert MD, 40 mg at 09/08/23 0809    ticagrelor (BRILINTA) tablet 90 mg, 90 mg, Oral, Q12H, Purnima Crockett, APRN, 90 mg at 09/08/23 0811    traZODone (DESYREL) tablet 150 mg, 150 mg, Oral, Nightly PRN, France Gilbert MD    traZODone (DESYREL) tablet 150 mg, 150 mg, Oral, Nightly, France Gilbert MD, 150 mg at 09/07/23 2100    vitamin B-12 (CYANOCOBALAMIN) tablet 1,000 mcg, 1,000 mcg, Oral, Daily, France Gilbert MD, 1,000 mcg at 09/08/23 0808    Diagnoses/Assessment:     Major depression    Factor II deficiency    Class 3 severe obesity due to excess calories with serious comorbidity and body mass index (BMI) of 60.0 to 69.9 in adult     Coronary artery disease of native artery with stable angina pectoris    Essential hypertension    Type 2 diabetes mellitus with hyperglycemia, with long-term current use of insulin    A-fib    Post traumatic stress disorder (PTSD)      Treatment Plan:    1) Will continue care for the patient on the behavioral health unit at [facility] to ensure patient safety.  2) Will continue to provide treatment with the unit milieu, activities, therapies and psychopharmacological management.  3) Patient to be placed on or continued on  Q15 minute checks  and Suicide precautions.  4) Pertinent medical issues:   --Low vit D and b-12: supplement.  --A-Fib: diltiazem, xarelto and brilinta  --Restless leg/neuropathy: mirapex and gabapentin  --Diabetes: insulin long acting and SSI, tradjenta  --HTN: lisinopril, metoprolol  --HLD: fenofibrate and statin  --GERD: PPI  5) Will order following labs: none  6) Will make the following medication changes:   PTSD & Depression:   --Cont Buspar 10mg tid   --Cont Lexapro 15mg daily  --Cont Prazosin 1mg bid  --Cont trazodone 150mg qhs and 150mg qhs PRN as he was on trazodone 300mg qhs for many years until stopped a couple of months ago when switched to Restoril.  Given the dose it may have had benefit for depression.  7) Will continue discharge planning for patient: outpatient psychiatric care, outpatient medical care, safety planning and placement as appropriate.     Treatment plan and medication risks and benefits discussed with: Patient and treatment team    Holly York, LALA  09/08/23 at 15:52 CDT

## 2023-09-09 LAB
GLUCOSE BLDC GLUCOMTR-MCNC: 183 MG/DL (ref 70–130)
GLUCOSE BLDC GLUCOMTR-MCNC: 293 MG/DL (ref 70–130)
GLUCOSE BLDC GLUCOMTR-MCNC: 304 MG/DL (ref 70–130)

## 2023-09-09 PROCEDURE — 94799 UNLISTED PULMONARY SVC/PX: CPT

## 2023-09-09 PROCEDURE — 63710000001 INSULIN DETEMIR PER 5 UNITS: Performed by: NURSE PRACTITIONER

## 2023-09-09 PROCEDURE — 63710000001 INSULIN ASPART PER 5 UNITS: Performed by: NURSE PRACTITIONER

## 2023-09-09 PROCEDURE — 82948 REAGENT STRIP/BLOOD GLUCOSE: CPT

## 2023-09-09 PROCEDURE — 94760 N-INVAS EAR/PLS OXIMETRY 1: CPT

## 2023-09-09 RX ADMIN — NICOTINE POLACRILEX 4 MG: 2 GUM, CHEWING BUCCAL at 15:19

## 2023-09-09 RX ADMIN — ALBUTEROL SULFATE 2.5 MG: 2.5 SOLUTION RESPIRATORY (INHALATION) at 17:25

## 2023-09-09 RX ADMIN — GABAPENTIN 600 MG: 300 CAPSULE ORAL at 20:15

## 2023-09-09 RX ADMIN — ALBUTEROL SULFATE 2.5 MG: 2.5 SOLUTION RESPIRATORY (INHALATION) at 11:39

## 2023-09-09 RX ADMIN — RIVAROXABAN 20 MG: 10 TABLET, FILM COATED ORAL at 17:01

## 2023-09-09 RX ADMIN — INSULIN DETEMIR 45 UNITS: 100 INJECTION, SOLUTION SUBCUTANEOUS at 20:31

## 2023-09-09 RX ADMIN — TRAZODONE HYDROCHLORIDE 150 MG: 150 TABLET ORAL at 20:15

## 2023-09-09 RX ADMIN — PRAZOSIN HYDROCHLORIDE 1 MG: 1 CAPSULE ORAL at 20:15

## 2023-09-09 RX ADMIN — BUSPIRONE HYDROCHLORIDE 10 MG: 10 TABLET ORAL at 08:04

## 2023-09-09 RX ADMIN — GABAPENTIN 600 MG: 300 CAPSULE ORAL at 08:03

## 2023-09-09 RX ADMIN — LINAGLIPTIN 5 MG: 5 TABLET, FILM COATED ORAL at 08:04

## 2023-09-09 RX ADMIN — TICAGRELOR 90 MG: 90 TABLET ORAL at 08:05

## 2023-09-09 RX ADMIN — BUSPIRONE HYDROCHLORIDE 10 MG: 10 TABLET ORAL at 15:17

## 2023-09-09 RX ADMIN — PANTOPRAZOLE SODIUM 40 MG: 40 TABLET, DELAYED RELEASE ORAL at 20:15

## 2023-09-09 RX ADMIN — INSULIN ASPART 15 UNITS: 100 INJECTION, SOLUTION INTRAVENOUS; SUBCUTANEOUS at 12:01

## 2023-09-09 RX ADMIN — DILTIAZEM HYDROCHLORIDE 120 MG: 120 CAPSULE, COATED, EXTENDED RELEASE ORAL at 08:05

## 2023-09-09 RX ADMIN — HYDROXYZINE PAMOATE 50 MG: 50 CAPSULE ORAL at 23:17

## 2023-09-09 RX ADMIN — PRAMIPEXOLE DIHYDROCHLORIDE 2 MG: 1 TABLET ORAL at 20:14

## 2023-09-09 RX ADMIN — INSULIN DETEMIR 10 UNITS: 100 INJECTION, SOLUTION SUBCUTANEOUS at 08:06

## 2023-09-09 RX ADMIN — INSULIN ASPART 4 UNITS: 100 INJECTION, SOLUTION INTRAVENOUS; SUBCUTANEOUS at 08:07

## 2023-09-09 RX ADMIN — LISINOPRIL 40 MG: 20 TABLET ORAL at 08:04

## 2023-09-09 RX ADMIN — NICOTINE POLACRILEX 4 MG: 2 GUM, CHEWING BUCCAL at 08:06

## 2023-09-09 RX ADMIN — INSULIN ASPART 15 UNITS: 100 INJECTION, SOLUTION INTRAVENOUS; SUBCUTANEOUS at 17:01

## 2023-09-09 RX ADMIN — NICOTINE POLACRILEX 4 MG: 2 GUM, CHEWING BUCCAL at 20:30

## 2023-09-09 RX ADMIN — INSULIN ASPART 12 UNITS: 100 INJECTION, SOLUTION INTRAVENOUS; SUBCUTANEOUS at 16:46

## 2023-09-09 RX ADMIN — HYDROXYZINE PAMOATE 50 MG: 50 CAPSULE ORAL at 11:59

## 2023-09-09 RX ADMIN — INSULIN ASPART 16 UNITS: 100 INJECTION, SOLUTION INTRAVENOUS; SUBCUTANEOUS at 12:00

## 2023-09-09 RX ADMIN — FENOFIBRATE 145 MG: 145 TABLET ORAL at 08:05

## 2023-09-09 RX ADMIN — BUSPIRONE HYDROCHLORIDE 10 MG: 10 TABLET ORAL at 20:14

## 2023-09-09 RX ADMIN — PRAZOSIN HYDROCHLORIDE 1 MG: 1 CAPSULE ORAL at 08:04

## 2023-09-09 RX ADMIN — INSULIN ASPART 15 UNITS: 100 INJECTION, SOLUTION INTRAVENOUS; SUBCUTANEOUS at 08:06

## 2023-09-09 RX ADMIN — TICAGRELOR 90 MG: 90 TABLET ORAL at 20:15

## 2023-09-09 RX ADMIN — ESCITALOPRAM 15 MG: 5 TABLET, FILM COATED ORAL at 08:04

## 2023-09-09 RX ADMIN — ROSUVASTATIN CALCIUM 40 MG: 10 TABLET, FILM COATED ORAL at 08:04

## 2023-09-09 RX ADMIN — TRAZODONE HYDROCHLORIDE 150 MG: 150 TABLET ORAL at 23:01

## 2023-09-09 RX ADMIN — CYANOCOBALAMIN TAB 1000 MCG 1000 MCG: 1000 TAB at 08:04

## 2023-09-09 RX ADMIN — GABAPENTIN 600 MG: 300 CAPSULE ORAL at 15:17

## 2023-09-09 RX ADMIN — METOPROLOL SUCCINATE 50 MG: 50 TABLET, EXTENDED RELEASE ORAL at 08:04

## 2023-09-09 NOTE — NURSING NOTE
"Behavior   Note any precipitants to event or behavior   Describe level and action of any aggressive behavior or speech and associated interventions.     Anxiety: Excess Worry and Decreased sleep  Depression: depressed mood, fatigue, and hopelessness  Pain  0  AVH   no  S/I   no  Plan  no  H/I   no  Plan  no    Affect   euthymic/normal      Note: Pt up early this am with c/o not sleeping well et having \"lack of motivation\". When asked if he was having SI, pt initially did not answer et began to mumble to himself before saying \"no\". Pt said that he didn't feel like himself et felt \"blah\". He took meds without difficulty et returned to his room where he was heard singing [employer] songs.       Intervention    PRN medication utilized:  no    Instructed in medication usage and effects  Medications administered as ordered  Encouraged to verbalize needs      Response    Verbalized understanding   Did patient take medications as ordered yes   Did patient interact with assessment?  yes     Plan    Will monitor for safety  Will monitor every 15 minutes as ordered  Will evaluate and promote the plan of care    Last BM:  unknown date  (Please chart in I/O as well)   "

## 2023-09-09 NOTE — PROGRESS NOTES
9/9/2023    Chief Complaint: depression    Subjective:  Patient is a 42 y.o. male that is currently inpt on the adult BHU today he is seen individually Pt is resting in bed, he states that he finds it difficult to get up out of bed this morning due to lack of motivation, this is what he is afraid will happen when he goes back home. He states that he does get up and engages with peers during the day.    Pt states that he is lonely at  home.  Asked pt about getting a roommate, he states that he has considered that in the past.   Pt states that he did not sleep well the other night and thinks he left nicotine gum in his mouth all night and that could be the problem  Pt is appropriate this day. He denies SI/HI/AVH.        Objective     Vital Signs    Temp:  [97.3 °F (36.3 °C)-97.8 °F (36.6 °C)] 97.8 °F (36.6 °C)  Heart Rate:  [69-83] 69  Resp:  [18-20] 18  BP: (142-160)/(67-74) 142/74    Physical Exam:   General Appearance: alert, appears stated age, and cooperative,  Hygiene:   fair  Gait & Station: Normal  Musculoskeletal: No tremors or abnormal involuntary movements    Mental Status Exam:   Cooperation:  Cooperative  Eye Contact:  Fair  Psychomotor Behavior:  Appropriate  Mood: Worried  Affect:  mood-congruent  Speech:  Normal  Thought Process:  Coherent  Associations: Goal Directed  Thought Content:     Mood congruent   Suicidal:  None   Homicidal:  None   Hallucinations:  None   Delusion:  None  Cognitive Functioning:   Consciousness: awake and alert  Reliability:  fair  Insight:  Fair  Judgement:  Fair  Impulse Control:  Fair    Lab Results (last 24 hours)       Procedure Component Value Units Date/Time    POC Glucose Once [[id number]]  (Abnormal) Collected: 09/09/23 0620    Specimen: Blood Updated: 09/09/23 0637     Glucose 183 mg/dL      Comment: RN NotifiedOperator: [id number] LEWIS Quarles ID: [id number]       POC Glucose Once [[id number]]  (Abnormal) Collected: 09/08/23 1557    Specimen: Blood Updated:  09/08/23 1609     Glucose 291 mg/dL      Comment: RN NotifiedOperator: [id number] CASANDRA Madrigalter ID: [id number]       POC Glucose Once [[id number]]  (Abnormal) Collected: 09/08/23 1105    Specimen: Blood Updated: 09/08/23 1118     Glucose 267 mg/dL      Comment: RN NotifiedOperator: [id number] RUTH Jadeter ID: [id number]             Imaging Results (Last 24 Hours)       ** No results found for the last 24 hours. **            Medicine:   Current Facility-Administered Medications:     acetaminophen (TYLENOL) tablet 650 mg, 650 mg, Oral, Q4H PRN, France Gilbert MD, 650 mg at 09/05/23 1325    albuterol (PROVENTIL) nebulizer solution 0.083% 2.5 mg/3mL, 2.5 mg, Nebulization, Q6H PRN, Purnima Crockett, APRN, 2.5 mg at 09/07/23 1936    aluminum-magnesium hydroxide-simethicone (MAALOX MAX) 400-400-40 MG/5ML suspension 15 mL, 15 mL, Oral, Q6H PRN, France Gilbert MD    busPIRone (BUSPAR) tablet 10 mg, 10 mg, Oral, TID, France Gilbert MD, 10 mg at 09/09/23 0804    cholecalciferol (VITAMIN D3) capsule 50,000 Units, 50,000 Units, Oral, Q7 Days, Holly York APRN, 50,000 Units at 09/05/23 1333    cloNIDine (CATAPRES) tablet 0.1 mg, 0.1 mg, Oral, Q4H PRN, France Gilbert MD, 0.1 mg at 09/04/23 0804    dextrose (D50W) (25 g/50 mL) IV injection 25 g, 25 g, Intravenous, Q15 Min PRN, France Gilbert MD    dextrose (GLUTOSE) oral gel 15 g, 15 g, Oral, Q15 Min PRN, Bon Purnima A, APRN    dilTIAZem CD (CARDIZEM CD) 24 hr capsule 120 mg, 120 mg, Oral, Daily, Purnima Crockett APRN, 120 mg at 09/09/23 0805    [COMPLETED] escitalopram (LEXAPRO) tablet 5 mg, 5 mg, Oral, Daily, 5 mg at 09/04/23 1306 **FOLLOWED BY** escitalopram (LEXAPRO) tablet 15 mg, 15 mg, Oral, Daily, Holly York APRN, 15 mg at 09/09/23 0804    fenofibrate (TRICOR) tablet 145 mg, 145 mg, Oral, Daily, Punrima Crockett APRN, 145 mg at 09/09/23 0805    gabapentin (NEURONTIN) capsule 600 mg, 600 mg, Oral, TID, Ofelia  MD France, 600 mg at 09/09/23 0803    glucagon HCl (Diagnostic) injection 1 mg, 1 mg, Intramuscular, Q15 Min PRN, France Gilbert MD    hydrOXYzine pamoate (VISTARIL) capsule 50 mg, 50 mg, Oral, Q6H PRN, France Gilbert MD, 50 mg at 09/08/23 1733    Insulin Aspart (novoLOG) injection 0-24 Units, 0-24 Units, Subcutaneous, TID AC, Crockett, Purnima A, APRN, 4 Units at 09/09/23 0807    Insulin Aspart (novoLOG) injection 15 Units, 15 Units, Subcutaneous, TID With Meals, Levill, Lavonne G, APRN, 15 Units at 09/09/23 0806    insulin detemir (LEVEMIR) injection 10 Units, 10 Units, Subcutaneous, Daily, Levill, Lavonne G, APRN, 10 Units at 09/09/23 0806    insulin detemir (LEVEMIR) injection 45 Units, 45 Units, Subcutaneous, Nightly, Levill, Lavonne G, APRN, 45 Units at 09/08/23 2029    linagliptin (TRADJENTA) tablet 5 mg, 5 mg, Oral, Daily, Crockett, Purnima A, APRN, 5 mg at 09/09/23 0804    lisinopril (PRINIVIL,ZESTRIL) tablet 40 mg, 40 mg, Oral, Daily, Rcockett, Purnima A, APRN, 40 mg at 09/09/23 0804    loperamide (IMODIUM) capsule 2 mg, 2 mg, Oral, Q2H PRN, France Giblert MD    magnesium hydroxide (MILK OF MAGNESIA) suspension 10 mL, 10 mL, Oral, Daily PRN, France Gilbert MD    metoprolol succinate XL (TOPROL-XL) 24 hr tablet 50 mg, 50 mg, Oral, Daily, Crockett, Purnima A, APRN, 50 mg at 09/09/23 0804    nicotine polacrilex (NICORETTE) gum 4 mg, 4 mg, Mouth/Throat, Q2H PRN, Mariusz Boone II, MD, 4 mg at 09/09/23 0806    ondansetron ODT (ZOFRAN-ODT) disintegrating tablet 4 mg, 4 mg, Oral, Q6H PRN, France Gilbert MD    pantoprazole (PROTONIX) EC tablet 40 mg, 40 mg, Oral, Nightly, Purnima Crockett APRN, 40 mg at 09/08/23 2028    pramipexole (MIRAPEX) tablet 2 mg, 2 mg, Oral, Nightly, France Gilbert MD, 2 mg at 09/08/23 2028    prazosin (MINIPRESS) capsule 1 mg, 1 mg, Oral, BID, France Gilbert MD, 1 mg at 09/09/23 0804    rivaroxaban (XARELTO) tablet 20 mg, 20 mg, Oral, Daily With Dinner, Bon,  Purnima A, APRN, 20 mg at 09/08/23 1709    rosuvastatin (CRESTOR) tablet 40 mg, 40 mg, Oral, Daily, France Gilbert MD, 40 mg at 09/09/23 0804    ticagrelor (BRILINTA) tablet 90 mg, 90 mg, Oral, Q12H, Purnima Crockett, APRN, 90 mg at 09/09/23 0805    traZODone (DESYREL) tablet 150 mg, 150 mg, Oral, Nightly PRN, France Gilbert MD, 150 mg at 09/08/23 2156    traZODone (DESYREL) tablet 150 mg, 150 mg, Oral, Nightly, France Gilbert MD, 150 mg at 09/08/23 2028    vitamin B-12 (CYANOCOBALAMIN) tablet 1,000 mcg, 1,000 mcg, Oral, Daily, France Gilbert MD, 1,000 mcg at 09/09/23 0804    Diagnoses/Assessment:     Major depression    Factor II deficiency    Class 3 severe obesity due to excess calories with serious comorbidity and body mass index (BMI) of 60.0 to 69.9 in adult    Coronary artery disease of native artery with stable angina pectoris    Essential hypertension    Type 2 diabetes mellitus with hyperglycemia, with long-term current use of insulin    A-fib    Post traumatic stress disorder (PTSD)      Treatment Plan:    1) Will continue care for the patient on the behavioral health unit at [facility] to ensure patient safety.  2) Will continue to provide treatment with the unit milieu, activities, therapies and psychopharmacological management.  3) Patient to be placed on or continued on  Q15 minute checks  and Suicide precautions.  4) Pertinent medical issues:   --Low vit D and b-12: supplement.  --A-Fib: diltiazem, xarelto and brilinta  --Restless leg/neuropathy: mirapex and gabapentin  --Diabetes: insulin long acting and SSI, tradjenta  --HTN: lisinopril, metoprolol  --HLD: fenofibrate and statin  --GERD: PPI  5) Will order following labs: none  6) Will make the following medication changes:   PTSD & Depression:   --Cont Buspar 10mg tid   --Cont Lexapro 15mg daily  --Cont Prazosin 1mg bid  --Cont trazodone 150mg qhs and 150mg qhs PRN as he was on trazodone 300mg qhs for many years  until stopped a couple of months ago when switched to Restoril.  Given the dose it may have had benefit for depression.  7) Will continue discharge planning as appropriate for patient.  8) Psychotherapy provided for less than 16 minutes.    Treatment plan and medication risks and benefits discussed with: Patient    LALA Esquivel  09/09/23  11:08 CDT

## 2023-09-09 NOTE — PLAN OF CARE
Goal Outcome Evaluation:  Plan of Care Reviewed With: patient  Patient Agreement with Plan of Care: agrees     Progress: no change  Outcome Evaluation: Pt has been interacting with staff et other pts et spoken of his mother. He said that he thinks his mother is attempting to tell him that he is going to be ok. He expressed more sadness et signs of depression this day.

## 2023-09-09 NOTE — PLAN OF CARE
Goal Outcome Evaluation:  Plan of Care Reviewed With: patient  Patient Agreement with Plan of Care: agrees     Progress: improving  Outcome Evaluation: Patient has been interacting with peers earlier in shift states he tinks he may get to go home today.  Slep well since the prn trazodone,, has slept.  Has slept 6 hours at this time

## 2023-09-09 NOTE — PROGRESS NOTES
[facility] Medicine Services  INPATIENT PROGRESS NOTE    Length of Stay: 6  Date of Admission: 9/3/2023  Primary Care Physician: Mami Perez APRN    Subjective   Chief Complaint: No medical complaints    HPI:  Patient is frustrated today about his blood sugar.  He states he has not had insulin at home for 2 months.  He has an insulin pump but states his primary care was unable to prescribe his insulin for it.  He had a follow up with endocrinology yesterday but was admitted on U.      Review of Systems   Constitutional:  Negative for activity change and fatigue.   HENT:  Negative for ear pain and sore throat.    Eyes:  Negative for pain and discharge.   Respiratory:  Negative for cough and shortness of breath.    Cardiovascular:  Negative for chest pain and palpitations.   Gastrointestinal:  Negative for abdominal pain and nausea.   Endocrine: Negative for cold intolerance and heat intolerance.   Genitourinary:  Negative for difficulty urinating and dysuria.   Musculoskeletal:  Negative for arthralgias and gait problem.   Skin:  Negative for color change and rash.   Neurological:  Negative for dizziness and weakness.   Psychiatric/Behavioral:  Negative for agitation and confusion.         Objective    Temp:  [97.3 °F (36.3 °C)-97.8 °F (36.6 °C)] 97.8 °F (36.6 °C)  Heart Rate:  [68-83] 79  Resp:  [16-22] 22  BP: (142-160)/(67-74) 154/72    Physical Exam  Constitutional:       Appearance: He is well-developed.   HENT:      Head: Normocephalic and atraumatic.   Eyes:      Pupils: Pupils are equal, round, and reactive to light.   Cardiovascular:      Rate and Rhythm: Normal rate and regular rhythm.   Pulmonary:      Effort: Pulmonary effort is normal.      Breath sounds: Normal breath sounds.   Abdominal:      General: Bowel sounds are normal.      Palpations: Abdomen is soft.   Musculoskeletal:         General: Normal range of motion.      Cervical back: Normal range of motion and neck  supple.   Skin:     General: Skin is warm and dry.   Neurological:      Mental Status: He is alert and oriented to person, place, and time.   Psychiatric:         Behavior: Behavior normal.     Results Review:  I have reviewed the labs, radiology results, and diagnostic studies.    Laboratory Data:   Results from last 7 days   Lab Units 09/03/23  1347   SODIUM mmol/L 135*   POTASSIUM mmol/L 4.8   CHLORIDE mmol/L 99   CO2 mmol/L 26.0   BUN mg/dL 12   CREATININE mg/dL 0.67*   GLUCOSE mg/dL 414*   CALCIUM mg/dL 9.6   BILIRUBIN mg/dL 0.4   ALK PHOS U/L 66   ALT (SGPT) U/L 31   AST (SGOT) U/L 23   ANION GAP mmol/L 10.0       Estimated Creatinine Clearance: 250.7 mL/min (A) (by C-G formula based on SCr of 0.67 mg/dL (L)).          Results from last 7 days   Lab Units 09/03/23  1347   WBC 10*3/mm3 7.05   HEMOGLOBIN g/dL 14.7   HEMATOCRIT % 43.6   PLATELETS 10*3/mm3 196             Culture Data:   No results found for: BLOODCX  No results found for: URINECX  No results found for: RESPCX  No results found for: WOUNDCX  No results found for: STOOLCX  No components found for: BODYFLD    Radiology Data:   Imaging Results (Last 24 Hours)       ** No results found for the last 24 hours. **            I have reviewed the patient's current medications.     Assessment/Plan     Active Hospital Problems    Diagnosis     **Major depression     Post traumatic stress disorder (PTSD)     A-fib     Essential hypertension     Type 2 diabetes mellitus with hyperglycemia, with long-term current use of insulin     Factor II deficiency     Coronary artery disease of native artery with stable angina pectoris     Class 3 severe obesity due to excess calories with serious comorbidity and body mass index (BMI) of 60.0 to 69.9 in adult        Plan:    Diabetes mellitus, type II, uncontrolled:      Patient is currently on:  45 units nightly Levemir  15 units TID with meals Novolog  10 units am Levemir- Will increase to 15 units for tighter afternoon  coverage.   High dose SSI    AM glucose 183.     Patient will be rescheduled with endocrinology at discharge.         This document has been electronically signed by LALA Carvajal on September 9, 2023 18:21 CDT

## 2023-09-09 NOTE — NURSING NOTE
Behavior   Note any precipitants to event or behavior   Describe level and action of any aggressive behavior or speech and associated interventions.     Anxiety: Decreased sleep and Decreased concentration  Depression: depressed mood  Pain  0  AVH   no  S/I   no  Plan  no  H/I   no  Plan  no    Affect   mood-congruent      Note:Patient in dining area interacting with peers.  States he has been having trouble with sleeping.  Offered his prn trazodone an he took it.  States his depression is better states thinks he will be going home soon.  Denies SI/I/AVH .  Cooperated with assessment and medications.  Remains a 1:1 while on cpap.  Will continue to monitor      Intervention    PRN medication utilized:  yes - trazodone    0320:  Since the prn dose of Trazodone patient has slept Well 6.25 hours at this time    Instructed in medication usage and effects  Medications administered as ordered  Encouraged to verbalize needs      Response    Verbalized understanding   Did patient take medications as ordered yes   Did patient interact with assessment?  yes     Plan    Will monitor for safety  Will monitor every 15 minutes as ordered  Will evaluate and promote the plan of care    Last BM:  unknown date  (Please chart in I/O as well)

## 2023-09-10 ENCOUNTER — APPOINTMENT (OUTPATIENT)
Dept: GENERAL RADIOLOGY | Facility: HOSPITAL | Age: 43
DRG: 885 | End: 2023-09-10
Payer: MEDICARE

## 2023-09-10 LAB
GLUCOSE BLDC GLUCOMTR-MCNC: 205 MG/DL (ref 70–130)
GLUCOSE BLDC GLUCOMTR-MCNC: 255 MG/DL (ref 70–130)
GLUCOSE BLDC GLUCOMTR-MCNC: 273 MG/DL (ref 70–130)
GLUCOSE BLDC GLUCOMTR-MCNC: 331 MG/DL (ref 70–130)
NT-PROBNP SERPL-MCNC: <36 PG/ML (ref 0–450)

## 2023-09-10 PROCEDURE — 63710000001 INSULIN DETEMIR PER 5 UNITS: Performed by: NURSE PRACTITIONER

## 2023-09-10 PROCEDURE — 83880 ASSAY OF NATRIURETIC PEPTIDE: CPT | Performed by: NURSE PRACTITIONER

## 2023-09-10 PROCEDURE — 71045 X-RAY EXAM CHEST 1 VIEW: CPT

## 2023-09-10 PROCEDURE — 63710000001 INSULIN ASPART PER 5 UNITS: Performed by: NURSE PRACTITIONER

## 2023-09-10 PROCEDURE — 82948 REAGENT STRIP/BLOOD GLUCOSE: CPT

## 2023-09-10 RX ORDER — INSULIN ASPART 100 [IU]/ML
20 INJECTION, SOLUTION INTRAVENOUS; SUBCUTANEOUS
Status: DISCONTINUED | OUTPATIENT
Start: 2023-09-10 | End: 2023-09-11 | Stop reason: HOSPADM

## 2023-09-10 RX ADMIN — INSULIN ASPART 16 UNITS: 100 INJECTION, SOLUTION INTRAVENOUS; SUBCUTANEOUS at 12:24

## 2023-09-10 RX ADMIN — NICOTINE POLACRILEX 4 MG: 2 GUM, CHEWING BUCCAL at 17:24

## 2023-09-10 RX ADMIN — INSULIN DETEMIR 15 UNITS: 100 INJECTION, SOLUTION SUBCUTANEOUS at 09:09

## 2023-09-10 RX ADMIN — INSULIN ASPART 8 UNITS: 100 INJECTION, SOLUTION INTRAVENOUS; SUBCUTANEOUS at 08:24

## 2023-09-10 RX ADMIN — BUSPIRONE HYDROCHLORIDE 10 MG: 10 TABLET ORAL at 15:16

## 2023-09-10 RX ADMIN — ROSUVASTATIN CALCIUM 40 MG: 10 TABLET, FILM COATED ORAL at 08:14

## 2023-09-10 RX ADMIN — PRAZOSIN HYDROCHLORIDE 1 MG: 1 CAPSULE ORAL at 20:59

## 2023-09-10 RX ADMIN — ALBUTEROL SULFATE 2.5 MG: 2.5 SOLUTION RESPIRATORY (INHALATION) at 16:18

## 2023-09-10 RX ADMIN — GABAPENTIN 600 MG: 300 CAPSULE ORAL at 20:58

## 2023-09-10 RX ADMIN — TRAZODONE HYDROCHLORIDE 150 MG: 150 TABLET ORAL at 20:59

## 2023-09-10 RX ADMIN — RIVAROXABAN 20 MG: 10 TABLET, FILM COATED ORAL at 17:42

## 2023-09-10 RX ADMIN — INSULIN ASPART 15 UNITS: 100 INJECTION, SOLUTION INTRAVENOUS; SUBCUTANEOUS at 08:17

## 2023-09-10 RX ADMIN — GABAPENTIN 600 MG: 300 CAPSULE ORAL at 08:14

## 2023-09-10 RX ADMIN — INSULIN ASPART 20 UNITS: 100 INJECTION, SOLUTION INTRAVENOUS; SUBCUTANEOUS at 17:42

## 2023-09-10 RX ADMIN — INSULIN DETEMIR 50 UNITS: 100 INJECTION, SOLUTION SUBCUTANEOUS at 21:01

## 2023-09-10 RX ADMIN — TICAGRELOR 90 MG: 90 TABLET ORAL at 21:03

## 2023-09-10 RX ADMIN — INSULIN ASPART 12 UNITS: 100 INJECTION, SOLUTION INTRAVENOUS; SUBCUTANEOUS at 16:43

## 2023-09-10 RX ADMIN — TICAGRELOR 90 MG: 90 TABLET ORAL at 08:16

## 2023-09-10 RX ADMIN — NICOTINE POLACRILEX 4 MG: 2 GUM, CHEWING BUCCAL at 12:26

## 2023-09-10 RX ADMIN — PANTOPRAZOLE SODIUM 40 MG: 40 TABLET, DELAYED RELEASE ORAL at 20:58

## 2023-09-10 RX ADMIN — CYANOCOBALAMIN TAB 1000 MCG 1000 MCG: 1000 TAB at 08:14

## 2023-09-10 RX ADMIN — INSULIN ASPART 15 UNITS: 100 INJECTION, SOLUTION INTRAVENOUS; SUBCUTANEOUS at 12:25

## 2023-09-10 RX ADMIN — BUSPIRONE HYDROCHLORIDE 10 MG: 10 TABLET ORAL at 20:58

## 2023-09-10 RX ADMIN — LINAGLIPTIN 5 MG: 5 TABLET, FILM COATED ORAL at 08:14

## 2023-09-10 RX ADMIN — DILTIAZEM HYDROCHLORIDE 120 MG: 120 CAPSULE, COATED, EXTENDED RELEASE ORAL at 08:15

## 2023-09-10 RX ADMIN — NICOTINE POLACRILEX 4 MG: 2 GUM, CHEWING BUCCAL at 20:56

## 2023-09-10 RX ADMIN — BUSPIRONE HYDROCHLORIDE 10 MG: 10 TABLET ORAL at 08:15

## 2023-09-10 RX ADMIN — HYDROXYZINE PAMOATE 50 MG: 50 CAPSULE ORAL at 22:46

## 2023-09-10 RX ADMIN — NICOTINE POLACRILEX 4 MG: 2 GUM, CHEWING BUCCAL at 08:17

## 2023-09-10 RX ADMIN — PRAZOSIN HYDROCHLORIDE 1 MG: 1 CAPSULE ORAL at 08:14

## 2023-09-10 RX ADMIN — TRAZODONE HYDROCHLORIDE 150 MG: 150 TABLET ORAL at 22:46

## 2023-09-10 RX ADMIN — LISINOPRIL 40 MG: 20 TABLET ORAL at 08:14

## 2023-09-10 RX ADMIN — PRAMIPEXOLE DIHYDROCHLORIDE 2 MG: 1 TABLET ORAL at 20:58

## 2023-09-10 RX ADMIN — GABAPENTIN 600 MG: 300 CAPSULE ORAL at 15:16

## 2023-09-10 RX ADMIN — FENOFIBRATE 145 MG: 145 TABLET ORAL at 08:16

## 2023-09-10 RX ADMIN — ESCITALOPRAM 15 MG: 5 TABLET, FILM COATED ORAL at 08:14

## 2023-09-10 RX ADMIN — METOPROLOL SUCCINATE 50 MG: 50 TABLET, EXTENDED RELEASE ORAL at 08:14

## 2023-09-10 NOTE — NURSING NOTE
Behavior   Note any precipitants to event or behavior   Describe level and action of any aggressive behavior or speech and associated interventions.     Anxiety: Restless/Edgy  Depression: depressed mood  Pain  0  AVH   no  S/I   no  Plan  no  H/I   no  Plan  no    Affect   mood-congruent      Note:Patient in lobby playing cards with peers.    Laughing and talking . Denies SI/HI/AVH  Will continue to monitor      Intervention    PRN medication utilized:  no    Instructed in medication usage and effects  Medications administered as ordered  Encouraged to verbalize needs      Response    Verbalized understanding   Did patient take medications as ordered yes   Did patient interact with assessment?  yes     Plan    Will monitor for safety  Will monitor every 15 minutes as ordered  Will evaluate and promote the plan of care    Last BM:  unknown date  (Please chart in I/O as well)

## 2023-09-10 NOTE — PLAN OF CARE
Goal Outcome Evaluation:  Plan of Care Reviewed With: patient  Patient Agreement with Plan of Care: agrees     Progress: improving  Outcome Evaluation: Pt has slept approximately 5.5 hours thus far. No behavioral or safety concerns noted overnight.

## 2023-09-10 NOTE — NURSING NOTE
"Behavior   Note any precipitants to event or behavior   Describe level and action of any aggressive behavior or speech and associated interventions.     Anxiety: Excess Worry  Depression: Patient denies at this time  Pain  0  AVH   no  S/I   no  Plan  no  H/I   no  Plan  no    Affect   euthymic/normal      Note: Pt is alert et appears to be in a good mood this am. Pt said that he feels better this day et slept well. He denies HI, SI et hallucinations et says that he is concerned about what he should do when discharged. Pt said that he is considering returning to his mothers home but is afraid that he will continue to have flashbacks of her face. Pt explained that his mother suddenly declined while at home with EMS being called. He said that his mother could not breath et was looking at him as if asking for help. Her BS was 25 per pt. Pt says that EMS attempted CPR on his mother for 45 min et resuscitated her. She was then on a vent et essentially passed away soon after. Pt says that he feels guilty for not helping his mother, even though he acknowledges that he could not have saved her. Pt says that he was having visual hallucinations of the the situation repeating while at home which caused him to seek help. Pt said that he is scared that they will continue et he is considering moving. He then began to voice concern over his fathers declining health et of his possibly passing soon. Pt said that he does not feel like he et his father are in \"a good place\" et that his dad always yelled at him when they attempted to spend time together.   Pt encouraged to attend outside counseling when discharged to address his past relationship issues et fears.     Intervention    PRN medication utilized:  yes - Nicotine gum    Instructed in medication usage and effects  Medications administered as ordered  Encouraged to verbalize needs      Response    Verbalized understanding   Did patient take medications as ordered yes   Did " patient interact with assessment?  yes     Plan    Will monitor for safety  Will monitor every 15 minutes as ordered  Will evaluate and promote the plan of care    Last BM:  unknown date  (Please chart in I/O as well)

## 2023-09-10 NOTE — PROGRESS NOTES
9/10/2023    Chief Complaint: depression    Subjective:  Patient is a 42 y.o. male that is currently inpt on the adult U today he is seen individually in his room. Pt reports that he is feeling some better he does report anxiety related to going back home to the house his mother passed away in  Pt is encouraged to think of good coping skills, to think of more positive times in the house.    Pt reports conflict with his father, states he has not many people for support and being here has been helpful to be around other people.   Pt is compliant with medications, he reports that he did sleep well, he is in medical bed due to dx of sleep apnea and needs head raised.     Objective     Vital Signs    Temp:  [97.9 °F (36.6 °C)-98.1 °F (36.7 °C)] 97.9 °F (36.6 °C)  Heart Rate:  [68-83] 83  Resp:  [16-18] 16  BP: (111-151)/(53-76) 151/76    Physical Exam:   General Appearance: alert, appears stated age, and cooperative,  Hygiene:   fair  Gait & Station: Normal  Musculoskeletal: No tremors or abnormal involuntary movements    Mental Status Exam:   Cooperation:  Cooperative  Eye Contact:  Fair  Psychomotor Behavior:  Appropriate  Mood: Improving  Affect:  mood-congruent  Speech:  Normal  Thought Process:  Appropriately abstract  Associations: Circumstantial  Thought Content:     Mood congruent   Suicidal:  None   Homicidal:  None   Hallucinations:  None   Delusion:  None  Cognitive Functioning:   Consciousness: awake and alert  Reliability:  fair  Insight:  Fair  Judgement:  Fair  Impulse Control:  Fair    Lab Results (last 24 hours)       Procedure Component Value Units Date/Time    POC Glucose Once [[id number]]  (Abnormal) Collected: 09/10/23 1124    Specimen: Blood Updated: 09/10/23 1138     Glucose 331 mg/dL      Comment: RN NotifiedOperator: [id number] HANH BERRYDanna ID: [id number]       POC Glucose Once [[id number]]  (Abnormal) Collected: 09/10/23 0554    Specimen: Blood Updated: 09/10/23 0606     Glucose 205 mg/dL       Comment: RN NotifiedOperator: [id number] JAKE Stack ID: [id number]       POC Glucose Once [[id number]]  (Abnormal) Collected: 09/09/23 1550    Specimen: Blood Updated: 09/09/23 1605     Glucose 293 mg/dL      Comment: RN NotifiedOperator: [id number] HANH Alvarez ID: [id number]             Imaging Results (Last 24 Hours)       ** No results found for the last 24 hours. **            Medicine:   Current Facility-Administered Medications:     acetaminophen (TYLENOL) tablet 650 mg, 650 mg, Oral, Q4H PRN, France Gilbert MD, 650 mg at 09/05/23 1325    albuterol (PROVENTIL) nebulizer solution 0.083% 2.5 mg/3mL, 2.5 mg, Nebulization, Q6H PRN, Bon Purnima A, APRN, 2.5 mg at 09/09/23 1725    aluminum-magnesium hydroxide-simethicone (MAALOX MAX) 400-400-40 MG/5ML suspension 15 mL, 15 mL, Oral, Q6H PRN, France Gilbert MD    busPIRone (BUSPAR) tablet 10 mg, 10 mg, Oral, TID, France Gilbert MD, 10 mg at 09/10/23 0815    cholecalciferol (VITAMIN D3) capsule 50,000 Units, 50,000 Units, Oral, Q7 Days, Holly York, APRN, 50,000 Units at 09/05/23 1333    cloNIDine (CATAPRES) tablet 0.1 mg, 0.1 mg, Oral, Q4H PRN, France Gilbert MD, 0.1 mg at 09/04/23 0804    dextrose (D50W) (25 g/50 mL) IV injection 25 g, 25 g, Intravenous, Q15 Min PRN, France Gilbert MD    dextrose (GLUTOSE) oral gel 15 g, 15 g, Oral, Q15 Min PRN, Bon, Purnima A, APRN    dilTIAZem CD (CARDIZEM CD) 24 hr capsule 120 mg, 120 mg, Oral, Daily, Bon, Purnima A, APRN, 120 mg at 09/10/23 0815    [COMPLETED] escitalopram (LEXAPRO) tablet 5 mg, 5 mg, Oral, Daily, 5 mg at 09/04/23 1306 **FOLLOWED BY** escitalopram (LEXAPRO) tablet 15 mg, 15 mg, Oral, Daily, Holly York APRN, 15 mg at 09/10/23 0814    fenofibrate (TRICOR) tablet 145 mg, 145 mg, Oral, Daily, Purnima Crockett APRN, 145 mg at 09/10/23 0816    gabapentin (NEURONTIN) capsule 600 mg, 600 mg, Oral, TID, France Gilbert MD, 600 mg at 09/10/23 0814     glucagon HCl (Diagnostic) injection 1 mg, 1 mg, Intramuscular, Q15 Min PRN, France Gilbert MD    hydrOXYzine pamoate (VISTARIL) capsule 50 mg, 50 mg, Oral, Q6H PRN, France Gilbert MD, 50 mg at 09/09/23 2317    Insulin Aspart (novoLOG) injection 0-24 Units, 0-24 Units, Subcutaneous, TID AC, Crockett, Purnima A, APRN, 16 Units at 09/10/23 1224    Insulin Aspart (novoLOG) injection 20 Units, 20 Units, Subcutaneous, TID With Meals, Levill, Lavonne G, APRN    [START ON 9/11/2023] insulin detemir (LEVEMIR) injection 20 Units, 20 Units, Subcutaneous, Daily, Levill, Lavonne G, APRN    insulin detemir (LEVEMIR) injection 50 Units, 50 Units, Subcutaneous, Nightly, Levill, Lavonne G, APRN    linagliptin (TRADJENTA) tablet 5 mg, 5 mg, Oral, Daily, Crockett, Purnima A, APRN, 5 mg at 09/10/23 0814    lisinopril (PRINIVIL,ZESTRIL) tablet 40 mg, 40 mg, Oral, Daily, Crockett, Purnima A, APRN, 40 mg at 09/10/23 0814    loperamide (IMODIUM) capsule 2 mg, 2 mg, Oral, Q2H PRN, France Gilbert MD    magnesium hydroxide (MILK OF MAGNESIA) suspension 10 mL, 10 mL, Oral, Daily PRN, France Gilbert MD    metoprolol succinate XL (TOPROL-XL) 24 hr tablet 50 mg, 50 mg, Oral, Daily, Crockett, Purnima A, APRN, 50 mg at 09/10/23 0814    nicotine polacrilex (NICORETTE) gum 4 mg, 4 mg, Mouth/Throat, Q2H PRN, Mariusz Boone II, MD, 4 mg at 09/10/23 1226    ondansetron ODT (ZOFRAN-ODT) disintegrating tablet 4 mg, 4 mg, Oral, Q6H PRN, France Gilbert MD    pantoprazole (PROTONIX) EC tablet 40 mg, 40 mg, Oral, Nightly, Purnima Crockett APRN, 40 mg at 09/09/23 2015    pramipexole (MIRAPEX) tablet 2 mg, 2 mg, Oral, Nightly, France Gilbert MD, 2 mg at 09/09/23 2014    prazosin (MINIPRESS) capsule 1 mg, 1 mg, Oral, BID, France Gilbert MD, 1 mg at 09/10/23 0814    rivaroxaban (XARELTO) tablet 20 mg, 20 mg, Oral, Daily With Dinner, Purnima Crockett APRN, 20 mg at 09/09/23 1701    rosuvastatin (CRESTOR) tablet 40 mg, 40 mg, Oral, Daily,  France Gilbert MD, 40 mg at 09/10/23 0814    ticagrelor (BRILINTA) tablet 90 mg, 90 mg, Oral, Q12H, Purnima Crockett APRN, 90 mg at 09/10/23 0816    traZODone (DESYREL) tablet 150 mg, 150 mg, Oral, Nightly PRN, France Gilbert MD, 150 mg at 09/09/23 2301    traZODone (DESYREL) tablet 150 mg, 150 mg, Oral, Nightly, France Gilbert MD, 150 mg at 09/09/23 2015    vitamin B-12 (CYANOCOBALAMIN) tablet 1,000 mcg, 1,000 mcg, Oral, Daily, France Gilbert MD, 1,000 mcg at 09/10/23 0814    Diagnoses/Assessment:     Major depression    Factor II deficiency    Class 3 severe obesity due to excess calories with serious comorbidity and body mass index (BMI) of 60.0 to 69.9 in adult    Coronary artery disease of native artery with stable angina pectoris    Essential hypertension    Type 2 diabetes mellitus with hyperglycemia, with long-term current use of insulin    A-fib    Post traumatic stress disorder (PTSD)      Treatment Plan:    1) Will continue care for the patient on the behavioral health unit at [facility] to ensure patient safety.  2) Will continue to provide treatment with the unit milieu, activities, therapies and psychopharmacological management.  3) Patient to be placed on or continued on  Q15 minute checks  and Suicide precautions.  4) Pertinent medical issues:   --Low vit D and b-12: supplement.  --A-Fib: diltiazem, xarelto and brilinta  --Restless leg/neuropathy: mirapex and gabapentin  --Diabetes: insulin long acting and SSI, tradjenta  --HTN: lisinopril, metoprolol  --HLD: fenofibrate and statin  --GERD: PPI  5) Will order following labs: none  6) Will make the following medication changes:   PTSD & Depression:   --Cont Buspar 10mg tid   --Cont Lexapro 15mg daily  --Cont Prazosin 1mg bid  --Cont trazodone 150mg qhs and 150mg qhs PRN as he was on trazodone 300mg qhs for many years until stopped a couple of months ago when switched to Restoril.  Given the dose it may have had  benefit for depression.  7) Will continue discharge planning as appropriate for patient.  8) Psychotherapy provided for less than 16 minutes.    Treatment plan and medication risks and benefits discussed with: Patient    LALA Esquivel  09/10/23  13:35 CDT

## 2023-09-10 NOTE — PLAN OF CARE
Goal Outcome Evaluation:  Plan of Care Reviewed With: patient  Patient Agreement with Plan of Care: agrees     Progress: no change  Outcome Evaluation: Pt has been at table talking to other pts with no complaints this pm.

## 2023-09-10 NOTE — PROGRESS NOTES
[facility] Medicine Services  INPATIENT PROGRESS NOTE    Length of Stay: 7  Date of Admission: 9/3/2023  Primary Care Physician: Mami Perez APRN    Subjective   Chief Complaint: No medical complaints    HPI:  Patient is frustrated today about his blood sugar.  He states he has not had insulin at home for 2 months.  He has an insulin pump but states his primary care was unable to prescribe his insulin for it.  He had a follow up with endocrinology yesterday but was admitted on U.      Review of Systems   Constitutional:  Negative for activity change and fatigue.   HENT:  Negative for ear pain and sore throat.    Eyes:  Negative for pain and discharge.   Respiratory:  Negative for cough and shortness of breath.    Cardiovascular:  Negative for chest pain and palpitations.   Gastrointestinal:  Negative for abdominal pain and nausea.   Endocrine: Negative for cold intolerance and heat intolerance.   Genitourinary:  Negative for difficulty urinating and dysuria.   Musculoskeletal:  Negative for arthralgias and gait problem.   Skin:  Negative for color change and rash.   Neurological:  Negative for dizziness and weakness.   Psychiatric/Behavioral:  Negative for agitation and confusion.         Objective    Temp:  [97.8 °F (36.6 °C)-98.1 °F (36.7 °C)] 97.9 °F (36.6 °C)  Heart Rate:  [68-83] 83  Resp:  [16-22] 16  BP: (111-154)/(53-76) 151/76    Physical Exam  Constitutional:       Appearance: He is well-developed.   HENT:      Head: Normocephalic and atraumatic.   Eyes:      Pupils: Pupils are equal, round, and reactive to light.   Cardiovascular:      Rate and Rhythm: Normal rate and regular rhythm.   Pulmonary:      Effort: Pulmonary effort is normal.      Breath sounds: Normal breath sounds.   Abdominal:      General: Bowel sounds are normal.      Palpations: Abdomen is soft.   Musculoskeletal:         General: Normal range of motion.      Cervical back: Normal range of motion and neck  supple.   Skin:     General: Skin is warm and dry.   Neurological:      Mental Status: He is alert and oriented to person, place, and time.   Psychiatric:         Behavior: Behavior normal.     Results Review:  I have reviewed the labs, radiology results, and diagnostic studies.    Laboratory Data:   Results from last 7 days   Lab Units 09/03/23  1347   SODIUM mmol/L 135*   POTASSIUM mmol/L 4.8   CHLORIDE mmol/L 99   CO2 mmol/L 26.0   BUN mg/dL 12   CREATININE mg/dL 0.67*   GLUCOSE mg/dL 414*   CALCIUM mg/dL 9.6   BILIRUBIN mg/dL 0.4   ALK PHOS U/L 66   ALT (SGPT) U/L 31   AST (SGOT) U/L 23   ANION GAP mmol/L 10.0       Estimated Creatinine Clearance: 250.7 mL/min (A) (by C-G formula based on SCr of 0.67 mg/dL (L)).          Results from last 7 days   Lab Units 09/03/23  1347   WBC 10*3/mm3 7.05   HEMOGLOBIN g/dL 14.7   HEMATOCRIT % 43.6   PLATELETS 10*3/mm3 196             Culture Data:   No results found for: BLOODCX  No results found for: URINECX  No results found for: RESPCX  No results found for: WOUNDCX  No results found for: STOOLCX  No components found for: BODYFLD    Radiology Data:   Imaging Results (Last 24 Hours)       ** No results found for the last 24 hours. **            I have reviewed the patient's current medications.     Assessment/Plan     Active Hospital Problems    Diagnosis     **Major depression     Post traumatic stress disorder (PTSD)     A-fib     Essential hypertension     Type 2 diabetes mellitus with hyperglycemia, with long-term current use of insulin     Factor II deficiency     Coronary artery disease of native artery with stable angina pectoris     Class 3 severe obesity due to excess calories with serious comorbidity and body mass index (BMI) of 60.0 to 69.9 in adult        Plan:    Diabetes mellitus, type II, uncontrolled:      Patient is currently on:  45 units nightly Levemir- Increase to 50 units  15 units TID with meals Novolog- Increase to 20 units  15 units am Levemir-  Increase to 20 units  High dose SSI    AM glucose 205, PM glucose 300    Patient will be rescheduled with endocrinology at discharge.         This document has been electronically signed by LALA Carvajal on September 10, 2023 12:27 CDT

## 2023-09-11 ENCOUNTER — TELEPHONE (OUTPATIENT)
Dept: ENDOCRINOLOGY | Facility: CLINIC | Age: 43
End: 2023-09-11
Payer: MEDICARE

## 2023-09-11 VITALS
WEIGHT: 315 LBS | TEMPERATURE: 97.6 F | DIASTOLIC BLOOD PRESSURE: 72 MMHG | OXYGEN SATURATION: 94 % | HEIGHT: 71 IN | BODY MASS INDEX: 44.1 KG/M2 | SYSTOLIC BLOOD PRESSURE: 154 MMHG | HEART RATE: 61 BPM | RESPIRATION RATE: 18 BRPM

## 2023-09-11 LAB
GLUCOSE BLDC GLUCOMTR-MCNC: 200 MG/DL (ref 70–130)
GLUCOSE BLDC GLUCOMTR-MCNC: 306 MG/DL (ref 70–130)

## 2023-09-11 PROCEDURE — 63710000001 INSULIN DETEMIR PER 5 UNITS: Performed by: NURSE PRACTITIONER

## 2023-09-11 PROCEDURE — 63710000001 INSULIN ASPART PER 5 UNITS: Performed by: NURSE PRACTITIONER

## 2023-09-11 PROCEDURE — 82948 REAGENT STRIP/BLOOD GLUCOSE: CPT

## 2023-09-11 RX ORDER — INSULIN ASPART 100 [IU]/ML
20 INJECTION, SOLUTION INTRAVENOUS; SUBCUTANEOUS
Qty: 2 ML | Refills: 0 | Status: SHIPPED | OUTPATIENT
Start: 2023-09-11

## 2023-09-11 RX ORDER — HYDROXYZINE PAMOATE 50 MG/1
50 CAPSULE ORAL 3 TIMES DAILY PRN
Qty: 90 CAPSULE | Refills: 0 | Status: SHIPPED | OUTPATIENT
Start: 2023-09-11

## 2023-09-11 RX ORDER — ESCITALOPRAM OXALATE 5 MG/1
15 TABLET ORAL DAILY
Qty: 90 TABLET | Refills: 1 | Status: SHIPPED | OUTPATIENT
Start: 2023-09-12

## 2023-09-11 RX ORDER — PRAZOSIN HYDROCHLORIDE 1 MG/1
1 CAPSULE ORAL 2 TIMES DAILY
Qty: 60 CAPSULE | Refills: 1 | Status: SHIPPED | OUTPATIENT
Start: 2023-09-11

## 2023-09-11 RX ORDER — TRAZODONE HYDROCHLORIDE 300 MG/1
150 TABLET ORAL NIGHTLY
Qty: 30 TABLET | Refills: 1 | Status: SHIPPED | OUTPATIENT
Start: 2023-09-11

## 2023-09-11 RX ADMIN — LISINOPRIL 40 MG: 20 TABLET ORAL at 08:38

## 2023-09-11 RX ADMIN — ROSUVASTATIN CALCIUM 40 MG: 10 TABLET, FILM COATED ORAL at 08:36

## 2023-09-11 RX ADMIN — INSULIN ASPART 16 UNITS: 100 INJECTION, SOLUTION INTRAVENOUS; SUBCUTANEOUS at 12:14

## 2023-09-11 RX ADMIN — LINAGLIPTIN 5 MG: 5 TABLET, FILM COATED ORAL at 08:38

## 2023-09-11 RX ADMIN — ESCITALOPRAM 15 MG: 5 TABLET, FILM COATED ORAL at 08:37

## 2023-09-11 RX ADMIN — GABAPENTIN 600 MG: 300 CAPSULE ORAL at 08:38

## 2023-09-11 RX ADMIN — NICOTINE POLACRILEX 4 MG: 2 GUM, CHEWING BUCCAL at 14:26

## 2023-09-11 RX ADMIN — INSULIN ASPART 20 UNITS: 100 INJECTION, SOLUTION INTRAVENOUS; SUBCUTANEOUS at 12:14

## 2023-09-11 RX ADMIN — DILTIAZEM HYDROCHLORIDE 120 MG: 120 CAPSULE, COATED, EXTENDED RELEASE ORAL at 08:40

## 2023-09-11 RX ADMIN — NICOTINE POLACRILEX 4 MG: 2 GUM, CHEWING BUCCAL at 08:49

## 2023-09-11 RX ADMIN — METOPROLOL SUCCINATE 50 MG: 50 TABLET, EXTENDED RELEASE ORAL at 08:38

## 2023-09-11 RX ADMIN — BUSPIRONE HYDROCHLORIDE 10 MG: 10 TABLET ORAL at 08:37

## 2023-09-11 RX ADMIN — CYANOCOBALAMIN TAB 1000 MCG 1000 MCG: 1000 TAB at 08:38

## 2023-09-11 RX ADMIN — INSULIN ASPART 20 UNITS: 100 INJECTION, SOLUTION INTRAVENOUS; SUBCUTANEOUS at 08:46

## 2023-09-11 RX ADMIN — FENOFIBRATE 145 MG: 145 TABLET ORAL at 08:39

## 2023-09-11 RX ADMIN — HYDROXYZINE PAMOATE 50 MG: 50 CAPSULE ORAL at 14:26

## 2023-09-11 RX ADMIN — INSULIN ASPART 8 UNITS: 100 INJECTION, SOLUTION INTRAVENOUS; SUBCUTANEOUS at 08:29

## 2023-09-11 RX ADMIN — TICAGRELOR 90 MG: 90 TABLET ORAL at 08:40

## 2023-09-11 RX ADMIN — INSULIN DETEMIR 25 UNITS: 100 INJECTION, SOLUTION SUBCUTANEOUS at 08:44

## 2023-09-11 RX ADMIN — PRAZOSIN HYDROCHLORIDE 1 MG: 1 CAPSULE ORAL at 08:37

## 2023-09-11 NOTE — NURSING NOTE
Behavior   Note any precipitants to event or behavior   Describe level and action of any aggressive behavior or speech and associated interventions.     Anxiety: Patient denies at this time  Depression: Patient denies at this time  Pain  0  AVH   no  S/I   no  Plan  no  H/I   no  Plan  no    Affect   euthymic/normal      Note: Patient seen in adult common area individually. Patient calm, cooperative, and, med compliant. Denies anxiety, depression, pain, SI/HI/AVH at this time. Patient stated that he slept well last night. No needs or concerns voiced at this time.       Intervention    PRN medication utilized:  no    Instructed in medication usage and effects  Medications administered as ordered  Encouraged to verbalize needs      Response    Verbalized understanding   Did patient take medications as ordered yes   Did patient interact with assessment?  yes     Plan    Will monitor for safety  Will monitor every 15 minutes as ordered  Will evaluate and promote the plan of care    Last BM:  unknown date  (Please chart in I/O as well)     DATE & TIME: 2/19 Day       Cognitive Concerns/ Orientation : A&Ox4   BEHAVIOR & AGGRESSION TOOL COLOR: Green  CIWA SCORE: n/a  ABNL VS/O2: VSS on RA except bradycardic. Uses CPAP at night.  MOBILITY: Independent, walks frequently  PAIN MANAGMENT: Reported back pain, declined intervention.  DIET: 2 gram sodium, good appetite.  BOWEL/BLADDER: Continent, using BR.  ABNL LAB/BG: n/a  DRAIN/DEVICES: PIV SL  TELEMETRY RHYTHM: n/a  SKIN: Bruises and scabs  TESTS/PROCEDURES: n/a  D/C DAY/GOALS/PLACE: Pending placement, plan is to TCU and then ETOH treatment, SW following.   OTHER IMPORTANT INFO:

## 2023-09-11 NOTE — NURSING NOTE
Behavior   Note any precipitants to event or behavior   Describe level and action of any aggressive behavior or speech and associated interventions.     Anxiety: Restless/Edgy  Depression: depressed mood  Pain  0  AVH   no  S/I   no  Plan  no  H/I   no  Plan  no    Affect   mood-congruent      Note:Patient in lobby playing cards with peers.    Laughing and talking . Denies SI/HI/AVH  Will continue to monitor    2200:  Sitter  here     Intervention    PRN medication utilized:  yes 2056 Nicorette gum    2246:  Vistaril Trazodone      2330:  Resting quietly    Instructed in medication usage and effects  Medications administered as ordered  Encouraged to verbalize needs      Response    Verbalized understanding   Did patient take medications as ordered yes   Did patient interact with assessment?  yes     Plan    Will monitor for safety  Will monitor every 15 minutes as ordered  Will evaluate and promote the plan of care    Last BM:  unknown date  (Please chart in I/O as well)

## 2023-09-11 NOTE — PLAN OF CARE
Goal Outcome Evaluation:  Plan of Care Reviewed With: patient  Patient Agreement with Plan of Care: agrees     Progress: improving  Outcome Evaluation: Patient increasing social interaction with peers.  remains on personal pap machine and has sitter at hs.  No complaints voiced has slept 6.25 hrs at this time

## 2023-09-11 NOTE — PROGRESS NOTES
Discharge and Safety Planning    Met with pt and discussed discharge planning today. Pt denies SI\HI\AVH at this time.   Pt denies feelings of depression or anxiety at this time.   Pt's plan for discharge is home with self care.   Pt will follow-up with PCP and [facility] for after care and treatment. MSW discussed the importance of following up with mental health providers.   MSW and pt discussed the crisis line and provided pt with number to access. The number for the National Suicide & Crisis Hotline is 1-727.157.3248.  The National Crisis Text number is 162077.    Pt and MSW discussed learned coping skills from attending groups and ind sessions. Pt verbalized they would use the following coping skills in need, deep breathing, walking away, calling a friend, going to the ED, and using the crisis line. Assisted patient in identifying risk factors which would indicate the need for higher level of care including thoughts to harm self or others and/or self-harming behavior and encouraged patient to  call 911, or present to the nearest emergency room should any of these events occur. Discussed crisis intervention services and means to access.  Patient adamantly and convincingly denies current suicidal or homicidal ideation or perceptual disturbance.  Pt does not have access to fire arms/weapons or karthikeyan, verified through safety planning.   Safety planning completed with patient this date .    Pt verbalized understanding of topics discussed. Pt had no further questions for this provider.
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
## 2023-09-11 NOTE — NURSING NOTE
Patient discharged from unit at this time. All belongings returned from unit and security. Discharge education completed. AVS faxed to [facility].

## 2023-09-13 ENCOUNTER — OFFICE VISIT (OUTPATIENT)
Dept: ENDOCRINOLOGY | Facility: CLINIC | Age: 43
End: 2023-09-13
Payer: MEDICARE

## 2023-09-13 VITALS
DIASTOLIC BLOOD PRESSURE: 98 MMHG | SYSTOLIC BLOOD PRESSURE: 150 MMHG | HEART RATE: 110 BPM | BODY MASS INDEX: 44.1 KG/M2 | HEIGHT: 71 IN | WEIGHT: 315 LBS | OXYGEN SATURATION: 99 %

## 2023-09-13 DIAGNOSIS — E78.2 MIXED HYPERLIPIDEMIA: ICD-10-CM

## 2023-09-13 DIAGNOSIS — E66.01 CLASS 3 SEVERE OBESITY DUE TO EXCESS CALORIES WITH SERIOUS COMORBIDITY AND BODY MASS INDEX (BMI) OF 60.0 TO 69.9 IN ADULT: ICD-10-CM

## 2023-09-13 DIAGNOSIS — E11.42 DIABETIC PERIPHERAL NEUROPATHY ASSOCIATED WITH TYPE 2 DIABETES MELLITUS: ICD-10-CM

## 2023-09-13 DIAGNOSIS — E11.65 TYPE 2 DIABETES MELLITUS WITH HYPERGLYCEMIA, WITH LONG-TERM CURRENT USE OF INSULIN: Primary | ICD-10-CM

## 2023-09-13 DIAGNOSIS — Z79.4 TYPE 2 DIABETES MELLITUS WITH HYPERGLYCEMIA, WITH LONG-TERM CURRENT USE OF INSULIN: Primary | ICD-10-CM

## 2023-09-13 DIAGNOSIS — I10 PRIMARY HYPERTENSION: ICD-10-CM

## 2023-09-13 RX ORDER — BLOOD-GLUCOSE METER
KIT MISCELLANEOUS
Qty: 120 EACH | Refills: 11 | Status: SHIPPED | OUTPATIENT
Start: 2023-09-13

## 2023-09-13 RX ORDER — BLOOD-GLUCOSE METER
1 KIT MISCELLANEOUS AS NEEDED
Qty: 1 EACH | Refills: 0 | Status: SHIPPED | OUTPATIENT
Start: 2023-09-13

## 2023-09-13 RX ORDER — INSULIN LISPRO 100 [IU]/ML
35 INJECTION, SOLUTION INTRAVENOUS; SUBCUTANEOUS 3 TIMES DAILY
Qty: 90 ML | Refills: 11 | Status: SHIPPED | OUTPATIENT
Start: 2023-09-13

## 2023-09-13 RX ORDER — ACYCLOVIR 400 MG/1
1 TABLET ORAL AS NEEDED
Qty: 1 EACH | Refills: 0 | Status: SHIPPED | OUTPATIENT
Start: 2023-09-13

## 2023-09-13 RX ORDER — BLOOD PRESSURE TEST KIT
KIT MISCELLANEOUS
Qty: 200 EACH | Refills: 11 | Status: SHIPPED | OUTPATIENT
Start: 2023-09-13

## 2023-09-13 RX ORDER — PEN NEEDLE, DIABETIC 31 GX5/16"
NEEDLE, DISPOSABLE MISCELLANEOUS
Qty: 200 EACH | Refills: 11 | Status: SHIPPED | OUTPATIENT
Start: 2023-09-13

## 2023-09-13 RX ORDER — ACYCLOVIR 400 MG/1
1 TABLET ORAL AS NEEDED
Qty: 3 EACH | Refills: 11 | Status: SHIPPED | OUTPATIENT
Start: 2023-09-13

## 2023-09-13 NOTE — PROGRESS NOTES
"Chief Complaint  Diabetes (Not checking sugar)    Subjective          Yordy Hansen presents to [facility] ENDOCRINOLOGY  Diabetes       In office visit         Chief Complaint   Patient presents with    Diabetes     Not checking sugar       HPI    Referring provider LALA Fink     42 year old male presents for follow up      Reason -- diabetes mellitus type 2     Duration  Diagnosed in 2017     Context routine lab work     Timing constant     Quality  not controlled     Severity moderate      complications---CAD, MI , stents X3 , neuropathy       Current diabetes regimen     Insulin, glp-1        Current glucose monitoring     fingersticks     Checks 4 times daily       Off pump due to insurance problems and adhesive problems     Patient has been off of his pump for about 2 months and also continue insulin    He had a traumatic home experience        He has severe depression    He was restarted on insulin in the hospital    He has restarted insulin, he has not checked his sugars because he does not have a meter          Review of Systems - General ROS: negative            Objective   Vital Signs:   /98   Pulse 110   Ht 180.3 cm (71\")   Wt (!) 206 kg (455 lb 3.2 oz)   SpO2 99%   BMI 63.49 kg/m²     Physical Exam  Constitutional:       Appearance: Normal appearance.   Cardiovascular:      Rate and Rhythm: Regular rhythm.      Heart sounds: Normal heart sounds.   Pulmonary:      Breath sounds: Normal breath sounds.   Musculoskeletal:      Cervical back: Normal range of motion.   Neurological:      Mental Status: He is alert.      Result Review :   The following data was reviewed by: LALA Tirado on 03/23/2022:  Common labs          7/31/2023    11:32 8/15/2023    14:12 9/3/2023    13:47   Common Labs   Glucose 233   414    Glucose  224        BUN 10  4     12    Creatinine 0.67  0.8     0.67    Sodium 136  139     135    Potassium 4.5  3.9     4.8  " "  Chloride 98  102     99    Calcium 9.1  8.9     9.6    Albumin 4.0  2.8     3.5    Total Bilirubin 0.7  0.50     0.4    Alkaline Phosphatase 83  76     66    AST (SGOT) 49  30     23    ALT (SGPT) 53  31     31    WBC 7.67   7.05    Hemoglobin 16.0   14.7    Hematocrit 46.9   43.6    Platelets 212   196    Total Cholesterol 231      Triglycerides 472      HDL Cholesterol 21      LDL Cholesterol  125      Hemoglobin A1C 11.60      Microalbumin, Urine 112.6         Details          This result is from an external source.                       Assessment and Plan    Diagnoses and all orders for this visit:    1. Type 2 diabetes mellitus with hyperglycemia, with long-term current use of insulin (Primary)    2. Diabetic peripheral neuropathy associated with type 2 diabetes mellitus    3. Mixed hyperlipidemia    4. Primary hypertension    5. Class 3 severe obesity due to excess calories with serious comorbidity and body mass index (BMI) of 60.0 to 69.9 in adult    Other orders  -     Tirzepatide 7.5 MG/0.5ML solution pen-injector; Inject 0.5 mL under the skin into the appropriate area as directed 1 (One) Time Per Week.  Dispense: 2 mL; Refill: 11  -     Insulin Glargine (LANTUS SOLOSTAR) 100 UNIT/ML injection pen; Inject 70 Units under the skin into the appropriate area as directed Daily.  Dispense: 45 mL; Refill: 11  -     Insulin Pen Needle (Pen Needles 5/16\") 31G X 8 MM misc; Inject 5 times daily  Dispense: 200 each; Refill: 11  -     Insulin Lispro, 1 Unit Dial, (HumaLOG KwikPen) 100 UNIT/ML solution pen-injector; Inject 35 Units under the skin into the appropriate area as directed 3 (Three) Times a Day.  Dispense: 90 mL; Refill: 11  -     Continuous Blood Gluc Sensor (Dexcom G7 Sensor) misc; Use 1 each As Needed (glucose monitoring). Every 10 days  Dispense: 3 each; Refill: 11  -     Continuous Blood Gluc  (Dexcom G7 ) device; Use 1 each As Needed (glucose monitoring).  Dispense: 1 each; Refill: " 0  -     glucose monitor monitoring kit; Use 1 each As Needed (to check bg).  Dispense: 1 each; Refill: 0  -     glucose blood test strip; Test 4 times daily  Dispense: 120 each; Refill: 11  -     B-D ULTRA-FINE 33 LANCETS misc; Use 4 x daily , any brand covered by insurance  Dispense: 120 each; Refill: 11  -     Alcohol Swabs pads; Use 4 times daily  Dispense: 200 each; Refill: 11                         Glycemic Management:    Diabetes mellitus type 2     Lab Results   Component Value Date    HGBA1C 11.60 (H) 07/31/2023               Taking Trulicity 3 mg once weekly -- not effective --sugars are running high , change to Mounjaro 7.5 mg weekly             Restart the following      Lantus 60 units once evening      Humalog 35 units each meal tid ---       +       Add sliding scale     151-200          3 units   201-250         6 units   251-300         9 units   Above 301   12  units             Metformin side effects       He will stay off the pump right now due to adhesive problems    I did call in a meter and testing supplies    We will also send in try to get him a Dexcom          Approve dexcom G7        The patient has diabetes mellitus, insulin-dependent.     Our Diabetes Department has evaluated the patient in the last six months and will continue counseling on insulin adjustment.      The patient performs blood glucose testing at least four times daily with proven glucose variability from 50 to 300 mg per dl.     The patient is administering basal insulin and prandial insulin four times per day for more than six months.     The patient uses a home blood glucose monitor to assess blood glucose at least four times daily for more than six months.     The patient requires frequent adjustment of insulin treatment regimen based on blood glucose readings.     The patient has frequent variability in blood glucose readings due to activity and variability in meal content and time.      The patient has completed a  diabetes education program with us.     The patient has demonstrated the ability to self-monitor her glucose.      The patient is motivated in improving diabetes control      The patient has hypoglycemia unawareness           Microvascular Complications Monitoring       Last eye exam, no DR     Neuropathy  Yes       Lipid Management:     Total Cholesterol   Date Value Ref Range Status   07/31/2023 231 (H) 150 - 200 mg/dL Final   03/31/2023 224 (H) <200 mg/dL Final     Triglycerides   Date Value Ref Range Status   07/31/2023 472 (H) <=150 mg/dL Final   03/31/2023 222 (H) 30 - 150 mg/dL Final   11/07/2022 238 (H) 10 - 150 mg/dL Final     HDL Cholesterol   Date Value Ref Range Status   07/31/2023 21 (L) 40 - 59 mg/dL Final   03/31/2023 38 32 - 72 mg/dL Final     LDL Cholesterol    Date Value Ref Range Status   07/31/2023 125 (H) <=100 mg/dL Final   03/31/2023 146 (H) 5 - 99 mg/dL Final   11/07/2022 158 mg/dL Final     Comment:         OPTIMAL: <100 mg/dl  LOW RISK: 100-129 mg/dl  BORDERLINE HIGH: 130-159 mg/dl  HIGH: 160-189 mg/dl  VERY HIGH: >189 mg/dl         Taking repatha     Taking fenofibrate       Blood Pressure Management:      Taking Cardizem 120 mg daily    Taking Norvasc 5 mg daily      Taking metoprolol 100 mg daily    Taking lisinopril 40 mg daily            Thyroid Health      Lab Results   Component Value Date    TSH 1.620 07/31/2023           Bone Health       Lab Results   Component Value Date    CALCIUM 9.6 09/03/2023    PHOS 4.4 04/01/2023           Weight Management:      Obesity     Patient's Body mass index is 63.49 kg/m². indicating that he is morbidly obese (BMI > 40 or > 35 with obesity - related health condition). Obesity-related health conditions include the following: diabetes mellitus. Obesity is unchanged. BMI is is above average; no BMI management plan is appropriate. We discussed portion control and increasing exercise..      Preventive Care:     Non smoker                         Follow  Up   Return in about 2 weeks (around 9/27/2023) for Recheck.  Patient was given instructions and counseling regarding his condition or for health maintenance advice. Please see specific information pulled into the AVS if appropriate.         This document has been electronically signed by LALA Tirado on September 13, 2023 16:35 CDT.

## 2025-02-13 NOTE — OUTREACH NOTE
COVID-19 Week 1 Survey      Responses   [facility] patient discharged from?  [place]   Does the patient have one of the following disease processes/diagnoses(primary or secondary)?  COVID-19   COVID-19 underlying condition?  None   Call Number  Call 2   Week 1 Call successful?  Yes   Call start time  [phone]   Call end time  [phone]   Meds reviewed with patient/caregiver?  Yes   Is the patient having any side effects they believe may be caused by any medication additions or changes?  No   Does the patient have all medications ordered at discharge?  Yes   Is the patient taking all medications as directed (includes completed medication regime)?  Yes   Does the patient have a primary care provider?   Yes   Has the patient kept scheduled appointments due by today?  N/A   Has home health visited the patient within 72 hours of discharge?  N/A   Psychosocial issues?  No   What is the patient's perception of their health status since discharge?  Improving   Does the patient have any of the following symptoms?  Loss of taste/smell   Pulse Ox monitoring  Intermittent   Pulse Ox device source  Patient   O2 Sat comments  States has not checked today yet.   Is the patient/caregiver able to teach back the hierarchy of who to call/visit for symptoms/problems? PCP, Specialist, Home health nurse, Urgent Care, ED, 911  Yes   COVID-19 call completed?  Yes   Wrap up additional comments  Brief call-patient states continues to do well. Denies any needs or problems today.          Marlene Ibarra RN  
[phone]
[phone]

## (undated) DEVICE — ELECTRODE,RT,STRESS,FOAM,50PK: Brand: MEDLINE

## (undated) DEVICE — SPLNT WRST RAD 3560

## (undated) DEVICE — ST CVR PROB PULLUP ULTRASND 5X48IN

## (undated) DEVICE — MODEL BT2000 P/N 700287-012KIT CONTENTS: MANIFOLD WITH SALINE AND CONTRAST PORTS, SALINE TUBING WITH SPIKE AND HAND SYRINGE, TRANSDUCER: Brand: BT2000 AUTOMATED MANIFOLD KIT

## (undated) DEVICE — CATH DIAG EXPO .056 FL3.5 6F 100CM

## (undated) DEVICE — CATH DIAG EXPO .056 FR4 6F 100CM

## (undated) DEVICE — CATH F6 ST JL 4 100CM: Brand: SUPERTORQUE

## (undated) DEVICE — NDL ANGIOGR ADV THN SMOTH SGLWALL 21G 1.5

## (undated) DEVICE — TR BAND RADIAL ARTERY COMPRESSION DEVICE: Brand: TR BAND

## (undated) DEVICE — X-DRAPE ABS 12"X17" .25MM LEAD EQUIV STERILE X-RAY SHIELD 10/BOX: Brand: X-DRAPE

## (undated) DEVICE — GW PERIPH GUIDERIGHT STD/EXCHNG/J/TIP SS 0.038IN 5X260CM

## (undated) DEVICE — 6F .070 XB LAD 3.5 100CM: Brand: VISTA BRITE TIP

## (undated) DEVICE — MODEL AT P65, P/N 701554-001KIT CONTENTS: HAND CONTROLLER, 3-WAY HIGH-PRESSURE STOPCOCK WITH ROTATING END AND PREMIUM HIGH-PRESSURE TUBING: Brand: ANGIOTOUCH® KIT

## (undated) DEVICE — GLIDESHEATH BASIC HYDROPHILIC COATED INTRODUCER SHEATH: Brand: GLIDESHEATH

## (undated) DEVICE — GW PERIPH GUIDERIGHT STD/J/TP PTFE/PCOAT SS 0.038IN 5X150CM

## (undated) DEVICE — GLIDESHEATH SLENDER STAINLESS STEEL KIT: Brand: GLIDESHEATH SLENDER

## (undated) DEVICE — PK CATH LAB 60

## (undated) DEVICE — RUNWAY RADL W/TOP PAD

## (undated) DEVICE — NC TREK CORONARY DILATATION CATHETER 3.0 MM X 15 MM / RAPID-EXCHANGE: Brand: NC TREK

## (undated) DEVICE — DEV INFL MONARCH 20ML

## (undated) DEVICE — RUNTHROUGH NS EXTRA FLOPPY PTCA GUIDEWIRE: Brand: RUNTHROUGH

## (undated) DEVICE — RADIFOCUS OPTITORQUE ANGIOGRAPHIC CATHETER: Brand: OPTITORQUE

## (undated) DEVICE — COPILOT KIT INCLUDES BLEEDBACK CONTROL VALVE / GUIDE WIRE INTRODUCER / TORQUE DEVICE: Brand: ACCESSORIES

## (undated) DEVICE — CATH F6 ST JR 4 100CM: Brand: SUPERTORQUE